# Patient Record
Sex: FEMALE | Race: WHITE | NOT HISPANIC OR LATINO | Employment: UNEMPLOYED | URBAN - METROPOLITAN AREA
[De-identification: names, ages, dates, MRNs, and addresses within clinical notes are randomized per-mention and may not be internally consistent; named-entity substitution may affect disease eponyms.]

---

## 2022-12-25 ENCOUNTER — APPOINTMENT (EMERGENCY)
Dept: CT IMAGING | Facility: HOSPITAL | Age: 57
End: 2022-12-25

## 2022-12-25 ENCOUNTER — HOSPITAL ENCOUNTER (INPATIENT)
Facility: HOSPITAL | Age: 57
LOS: 2 days | Discharge: HOME | End: 2022-12-27
Attending: EMERGENCY MEDICINE | Admitting: HOSPITALIST

## 2022-12-25 DIAGNOSIS — C18.7 MALIGNANT NEOPLASM OF SIGMOID COLON (HCC): ICD-10-CM

## 2022-12-25 DIAGNOSIS — A41.9 SEPSIS (HCC): ICD-10-CM

## 2022-12-25 DIAGNOSIS — K52.9 COLITIS: Primary | ICD-10-CM

## 2022-12-25 DIAGNOSIS — N13.30 HYDRONEPHROSIS: ICD-10-CM

## 2022-12-25 DIAGNOSIS — A41.9 SEVERE SEPSIS (HCC): ICD-10-CM

## 2022-12-25 DIAGNOSIS — R65.20 SEVERE SEPSIS (HCC): ICD-10-CM

## 2022-12-25 PROBLEM — E87.6 HYPOKALEMIA: Status: ACTIVE | Noted: 2022-12-25

## 2022-12-25 PROBLEM — R74.01 TRANSAMINITIS: Status: ACTIVE | Noted: 2022-12-25

## 2022-12-25 PROBLEM — R73.01 IMPAIRED FASTING GLUCOSE: Status: ACTIVE | Noted: 2022-12-25

## 2022-12-25 PROBLEM — I10 PRIMARY HYPERTENSION: Status: ACTIVE | Noted: 2022-12-25

## 2022-12-25 PROBLEM — E83.118 OTHER HEMOCHROMATOSIS: Status: ACTIVE | Noted: 2022-12-25

## 2022-12-25 PROBLEM — D86.9 SARCOIDOSIS: Status: ACTIVE | Noted: 2022-12-25

## 2022-12-25 LAB
2HR DELTA HS TROPONIN: 1 NG/L
4HR DELTA HS TROPONIN: 0 NG/L
ALBUMIN SERPL BCP-MCNC: 3.6 G/DL (ref 3.5–5)
ALP SERPL-CCNC: 165 U/L (ref 34–104)
ALT SERPL W P-5'-P-CCNC: 87 U/L (ref 7–52)
ANION GAP SERPL CALCULATED.3IONS-SCNC: 11 MMOL/L (ref 4–13)
APTT PPP: 27 SECONDS (ref 23–37)
AST SERPL W P-5'-P-CCNC: 54 U/L (ref 13–39)
BACTERIA UR QL AUTO: ABNORMAL /HPF
BASOPHILS # BLD AUTO: 0.06 THOUSANDS/ÂΜL (ref 0–0.1)
BASOPHILS NFR BLD AUTO: 0 % (ref 0–1)
BILIRUB SERPL-MCNC: 1.09 MG/DL (ref 0.2–1)
BILIRUB UR QL STRIP: NEGATIVE
BUN SERPL-MCNC: 28 MG/DL (ref 5–25)
CALCIUM SERPL-MCNC: 8.9 MG/DL (ref 8.4–10.2)
CARDIAC TROPONIN I PNL SERPL HS: 21 NG/L
CARDIAC TROPONIN I PNL SERPL HS: 21 NG/L
CARDIAC TROPONIN I PNL SERPL HS: 22 NG/L
CHLORIDE SERPL-SCNC: 102 MMOL/L (ref 96–108)
CLARITY UR: CLEAR
CO2 SERPL-SCNC: 23 MMOL/L (ref 21–32)
COLOR UR: COLORLESS
CREAT SERPL-MCNC: 0.86 MG/DL (ref 0.6–1.3)
EOSINOPHIL # BLD AUTO: 0.18 THOUSAND/ÂΜL (ref 0–0.61)
EOSINOPHIL NFR BLD AUTO: 1 % (ref 0–6)
ERYTHROCYTE [DISTWIDTH] IN BLOOD BY AUTOMATED COUNT: 14.6 % (ref 11.6–15.1)
FLUAV RNA RESP QL NAA+PROBE: NEGATIVE
FLUBV RNA RESP QL NAA+PROBE: NEGATIVE
GFR SERPL CREATININE-BSD FRML MDRD: 76 ML/MIN/1.73SQ M
GLUCOSE SERPL-MCNC: 142 MG/DL (ref 65–140)
GLUCOSE UR STRIP-MCNC: NEGATIVE MG/DL
HCT VFR BLD AUTO: 38.4 % (ref 34.8–46.1)
HGB BLD-MCNC: 12.8 G/DL (ref 11.5–15.4)
HGB UR QL STRIP.AUTO: ABNORMAL
IMM GRANULOCYTES # BLD AUTO: 0.2 THOUSAND/UL (ref 0–0.2)
IMM GRANULOCYTES NFR BLD AUTO: 1 % (ref 0–2)
INR PPP: 1 (ref 0.84–1.19)
KETONES UR STRIP-MCNC: NEGATIVE MG/DL
LACTATE SERPL-SCNC: 1 MMOL/L (ref 0.5–2)
LACTATE SERPL-SCNC: 1.4 MMOL/L (ref 0.5–2)
LACTATE SERPL-SCNC: 2.8 MMOL/L (ref 0.5–2)
LEUKOCYTE ESTERASE UR QL STRIP: ABNORMAL
LYMPHOCYTES # BLD AUTO: 1.98 THOUSANDS/ÂΜL (ref 0.6–4.47)
LYMPHOCYTES NFR BLD AUTO: 8 % (ref 14–44)
MCH RBC QN AUTO: 30.2 PG (ref 26.8–34.3)
MCHC RBC AUTO-ENTMCNC: 33.3 G/DL (ref 31.4–37.4)
MCV RBC AUTO: 91 FL (ref 82–98)
MONOCYTES # BLD AUTO: 0.67 THOUSAND/ÂΜL (ref 0.17–1.22)
MONOCYTES NFR BLD AUTO: 3 % (ref 4–12)
NEUTROPHILS # BLD AUTO: 21.28 THOUSANDS/ÂΜL (ref 1.85–7.62)
NEUTS SEG NFR BLD AUTO: 87 % (ref 43–75)
NITRITE UR QL STRIP: NEGATIVE
NON-SQ EPI CELLS URNS QL MICRO: ABNORMAL /HPF
NRBC BLD AUTO-RTO: 0 /100 WBCS
PH UR STRIP.AUTO: 7.5 [PH]
PLATELET # BLD AUTO: 124 THOUSANDS/UL (ref 149–390)
PLATELET # BLD AUTO: 221 THOUSANDS/UL (ref 149–390)
PMV BLD AUTO: 9.6 FL (ref 8.9–12.7)
PMV BLD AUTO: 9.6 FL (ref 8.9–12.7)
POTASSIUM SERPL-SCNC: 3.3 MMOL/L (ref 3.5–5.3)
PROCALCITONIN SERPL-MCNC: 0.15 NG/ML
PROT SERPL-MCNC: 5.9 G/DL (ref 6.4–8.4)
PROT UR STRIP-MCNC: ABNORMAL MG/DL
PROTHROMBIN TIME: 13.4 SECONDS (ref 11.6–14.5)
RBC # BLD AUTO: 4.24 MILLION/UL (ref 3.81–5.12)
RBC #/AREA URNS AUTO: ABNORMAL /HPF
RSV RNA RESP QL NAA+PROBE: NEGATIVE
SARS-COV-2 RNA RESP QL NAA+PROBE: NEGATIVE
SODIUM SERPL-SCNC: 136 MMOL/L (ref 135–147)
SP GR UR STRIP.AUTO: 1.05 (ref 1–1.03)
UROBILINOGEN UR STRIP-ACNC: <2 MG/DL
WBC # BLD AUTO: 24.37 THOUSAND/UL (ref 4.31–10.16)
WBC #/AREA URNS AUTO: ABNORMAL /HPF

## 2022-12-25 RX ORDER — MAGNESIUM HYDROXIDE/ALUMINUM HYDROXICE/SIMETHICONE 120; 1200; 1200 MG/30ML; MG/30ML; MG/30ML
30 SUSPENSION ORAL EVERY 6 HOURS PRN
Status: DISCONTINUED | OUTPATIENT
Start: 2022-12-25 | End: 2022-12-27 | Stop reason: HOSPADM

## 2022-12-25 RX ORDER — ENOXAPARIN SODIUM 100 MG/ML
40 INJECTION SUBCUTANEOUS DAILY
Status: DISCONTINUED | OUTPATIENT
Start: 2022-12-25 | End: 2022-12-26

## 2022-12-25 RX ORDER — TAMSULOSIN HYDROCHLORIDE 0.4 MG/1
0.4 CAPSULE ORAL
Status: ON HOLD | COMMUNITY
End: 2023-01-07 | Stop reason: SDUPTHER

## 2022-12-25 RX ORDER — SODIUM CHLORIDE 9 MG/ML
100 INJECTION, SOLUTION INTRAVENOUS CONTINUOUS
Status: DISCONTINUED | OUTPATIENT
Start: 2022-12-25 | End: 2022-12-27

## 2022-12-25 RX ORDER — PREDNISONE 20 MG/1
20 TABLET ORAL DAILY
COMMUNITY

## 2022-12-25 RX ORDER — PREDNISONE 10 MG/1
10 TABLET ORAL
COMMUNITY

## 2022-12-25 RX ORDER — ONDANSETRON 2 MG/ML
4 INJECTION INTRAMUSCULAR; INTRAVENOUS EVERY 6 HOURS PRN
Status: DISCONTINUED | OUTPATIENT
Start: 2022-12-25 | End: 2022-12-27 | Stop reason: HOSPADM

## 2022-12-25 RX ORDER — METHYLPREDNISOLONE SODIUM SUCCINATE 40 MG/ML
20 INJECTION, POWDER, LYOPHILIZED, FOR SOLUTION INTRAMUSCULAR; INTRAVENOUS EVERY 8 HOURS SCHEDULED
Status: DISCONTINUED | OUTPATIENT
Start: 2022-12-25 | End: 2022-12-26

## 2022-12-25 RX ORDER — METRONIDAZOLE 500 MG/100ML
500 INJECTION, SOLUTION INTRAVENOUS EVERY 8 HOURS
Status: DISCONTINUED | OUTPATIENT
Start: 2022-12-25 | End: 2022-12-26

## 2022-12-25 RX ORDER — ACETAMINOPHEN 325 MG/1
650 TABLET ORAL EVERY 6 HOURS PRN
COMMUNITY

## 2022-12-25 RX ORDER — CAPECITABINE 500 MG/1
1650 TABLET, FILM COATED ORAL
COMMUNITY
End: 2022-12-27

## 2022-12-25 RX ORDER — MORPHINE SULFATE 4 MG/ML
4 INJECTION, SOLUTION INTRAMUSCULAR; INTRAVENOUS ONCE
Status: COMPLETED | OUTPATIENT
Start: 2022-12-25 | End: 2022-12-25

## 2022-12-25 RX ORDER — LABETALOL HYDROCHLORIDE 5 MG/ML
10 INJECTION, SOLUTION INTRAVENOUS EVERY 6 HOURS PRN
Status: DISCONTINUED | OUTPATIENT
Start: 2022-12-25 | End: 2022-12-27 | Stop reason: HOSPADM

## 2022-12-25 RX ORDER — ONDANSETRON 2 MG/ML
4 INJECTION INTRAMUSCULAR; INTRAVENOUS ONCE
Status: COMPLETED | OUTPATIENT
Start: 2022-12-25 | End: 2022-12-25

## 2022-12-25 RX ADMIN — SODIUM CHLORIDE 125 ML/HR: 0.9 INJECTION, SOLUTION INTRAVENOUS at 17:09

## 2022-12-25 RX ADMIN — METHYLPREDNISOLONE SODIUM SUCCINATE 20 MG: 40 INJECTION, POWDER, FOR SOLUTION INTRAMUSCULAR; INTRAVENOUS at 21:11

## 2022-12-25 RX ADMIN — SODIUM CHLORIDE 125 ML/HR: 0.9 INJECTION, SOLUTION INTRAVENOUS at 12:24

## 2022-12-25 RX ADMIN — ENOXAPARIN SODIUM 40 MG: 40 INJECTION SUBCUTANEOUS at 12:35

## 2022-12-25 RX ADMIN — METRONIDAZOLE 500 MG: 5 INJECTION, SOLUTION INTRAVENOUS at 11:29

## 2022-12-25 RX ADMIN — CEFTRIAXONE SODIUM 1000 MG: 10 INJECTION, POWDER, FOR SOLUTION INTRAVENOUS at 08:37

## 2022-12-25 RX ADMIN — IOHEXOL 100 ML: 350 INJECTION, SOLUTION INTRAVENOUS at 08:26

## 2022-12-25 RX ADMIN — ONDANSETRON 4 MG: 2 INJECTION INTRAMUSCULAR; INTRAVENOUS at 07:37

## 2022-12-25 RX ADMIN — METHYLPREDNISOLONE SODIUM SUCCINATE 20 MG: 40 INJECTION, POWDER, FOR SOLUTION INTRAMUSCULAR; INTRAVENOUS at 14:10

## 2022-12-25 RX ADMIN — MORPHINE SULFATE 4 MG: 4 INJECTION INTRAVENOUS at 07:38

## 2022-12-25 RX ADMIN — METRONIDAZOLE 500 MG: 5 INJECTION, SOLUTION INTRAVENOUS at 17:53

## 2022-12-25 RX ADMIN — SODIUM CHLORIDE 1000 ML: 0.9 INJECTION, SOLUTION INTRAVENOUS at 08:16

## 2022-12-25 NOTE — ASSESSMENT & PLAN NOTE
Patient with recent history of kidney stones with bilateral ureteral stents in place    CT reveals moderate right and left hydro with bilateral ureteral stents and several areas of calcification along the stent which radiology states could represent sites of encrustation or calculi within the ureter  GFR of 76  Currently no acute issues  We will hold off any urology consult currently

## 2022-12-25 NOTE — ASSESSMENT & PLAN NOTE
Patient states has mildly elevated blood sugars can lead to steroid use but not on any medications  If blood sugars persistently elevated may need a sliding scale coverage

## 2022-12-25 NOTE — ASSESSMENT & PLAN NOTE
Patient presents with sepsis-secondary to acute colitis with tachycardia, leukocytosis, lactic acidosis and source of infection  Patient on fluid resuscitation  Lactic acid of 2 8  We will repeat a lactate in 2 hours time-continue fluids till lactate normalizes

## 2022-12-25 NOTE — ASSESSMENT & PLAN NOTE
States that she was diagnosed with hemochromatosis-not on any treatment currently  Need to obtain further records from her previous hospital

## 2022-12-25 NOTE — ASSESSMENT & PLAN NOTE
Blood pressure elevated    Secondary to sepsis  Not on any medications at home for blood pressure  Will add IV labetalol as needed for SBP more than 160  Depending on blood pressure levels here may need blood pressure medications for home once discharged

## 2022-12-25 NOTE — ED NOTES
Feeling much better, requested to walk to bathroom and did so without difficulty, derik Carty, RN  12/25/22 0592

## 2022-12-25 NOTE — ASSESSMENT & PLAN NOTE
Patient states that she was diagnosed with sarcoidosis secondary to miliary findings on her lung  Usually thought to be tuberculosis  Biopsy revealed noncaseating granulomas  Currently on prednisone 30 mg daily  Given sepsis and colitis will change to IV methylprednisolone 20 mg every 8hrs  This will cover the stress of sepsis as well    Changed to prednisone as well as colitis improves

## 2022-12-25 NOTE — ASSESSMENT & PLAN NOTE
Colon cancer s/p partial colectomy with colostomy formation 2 months ago in [place]  -Need to obtain records from her previous hospital  -Stoma functioning well    Patient denies any increased output from ostomy  -Consult wound care for ostomy care

## 2022-12-25 NOTE — SEPSIS NOTE
Sepsis Note   Liz Howard 56 y o  female MRN: [id number]  Unit/Bed#: ED-17 Encounter: [id number]       qSOFA     Row Name 12/25/22 1026 12/25/22 0929 12/25/22 0756 12/25/22 0653       Altered mental status GCS < 15 -- -- -- --     Respiratory Rate > / =22 0 0 0 1     Systolic BP < / =247 0 0 0 0     Q Sofa Score 0 0 0 1                Initial Sepsis Screening     Row Name 12/25/22 1037                Is the patient's history suggestive of a new or worsening infection? Yes (Proceed)  -JI        Suspected source of infection acute abdominal infection  -JI        Are two or more of the following signs & symptoms of infection both present and new to the patient? Yes (Proceed)  -JI        Indicate SIRS criteria Leukocytosis (WBC > 35928 IJL); Tachycardia > 90 bpm  -JI        If the answer is yes to both questions, suspicion of sepsis is present --        If severe sepsis is present AND tissue hypoperfusion perists in the hour after fluid resuscitation or lactate > 4, the patient meets criteria for SEPTIC SHOCK --        Are any of the following organ dysfunction criteria present within 6 hours of suspected infection and SIRS criteria that are NOT considered to be chronic conditions? Yes  -JI        Organ dysfunction Lactate > 2 0 mmol/L  -JI        Date of presentation of severe sepsis 12/25/22  -JI        Time of presentation of severe sepsis 0830  -JI        Tissue hypoperfusion persists in the hour after crystalloid fluid administration, evidenced, by either: --        Was hypotension present within one hour of the conclusion of crystalloid fluid administration?  No  -JI        Date of presentation of septic shock --        Time of presentation of septic shock --              User Key  (r) = Recorded By, (t) = Taken By, (c) = Cosigned By    [address] Name Provider Marlene Horner MD Physician

## 2022-12-25 NOTE — ASSESSMENT & PLAN NOTE
-Patient with a history of colon cancer s/p partial colectomy with ostomy in 2 months ago in ER  -Last chemotherapy on 12/20  Also on Xeloda  -Recently moved to this area hence no previous records available  -Presented with 2-day history of lower abdominal pain around her stoma and suprapubic pain  -Good ostomy output  No vomiting    Urine clear yellow  -CT reveals colitis with bowel involvement from the transverse colon to the level of the colostomy without obstruction  -Also has bilateral moderate hydro with bilateral stents secondary to ureteric calculi  -Started patient on ceftriaxone and Flagyl  -Morphine for pain  -IV fluids for sepsis and lactic acidosis  -Blood cultures sent  -Stool for C  difficile sent as well  Patient looking to establish care locally for all her needs

## 2022-12-25 NOTE — H&P
[facility]  H&P- Shubham Lorenz 1965, 56 y o  female MRN: [id number]  Unit/Bed#: ED-17 Encounter: [id number]  Primary Care Provider: No primary care provider on file  Date and time admitted to hospital: 12/25/2022  6:46 AM    Sepsis [facility])  Assessment & Plan  Patient presents with sepsis-secondary to acute colitis with tachycardia, leukocytosis, lactic acidosis and source of infection  Patient on fluid resuscitation  Lactic acid of 2 8  We will repeat a lactate in 2 hours time-continue fluids till lactate normalizes  * Acute colitis  Assessment & Plan  -Patient with a history of colon cancer s/p partial colectomy with ostomy in 2 months ago in ER  -Last chemotherapy on 12/20  Also on Xeloda  -Recently moved to this area hence no previous records available  -Presented with 2-day history of lower abdominal pain around her stoma and suprapubic pain  -Good ostomy output  No vomiting  Urine clear yellow  -CT reveals colitis with bowel involvement from the transverse colon to the level of the colostomy without obstruction  -Also has bilateral moderate hydro with bilateral stents secondary to ureteric calculi  -Started patient on ceftriaxone and Flagyl  -Morphine for pain  -IV fluids for sepsis and lactic acidosis  -Blood cultures sent  -Stool for C  difficile sent as well  Patient looking to establish care locally for all her needs    Hypokalemia  Assessment & Plan  Potassium of 3 3 likely secondary to colitis and decreased oral intake  Will replete    Malignant neoplasm of sigmoid colon [facility])  Assessment & Plan  Colon cancer s/p partial colectomy with colostomy formation 2 months ago in [place]  S/p chemotherapy last dose 1220  Also on Xeloda at home which will be held currently while septic  -Need to obtain records from her previous hospital  -Stoma functioning well    Patient denies any increased output from ostomy  -Consult wound care for ostomy care    Primary hypertension  Assessment & Plan  Blood pressure elevated  Secondary to sepsis  Not on any medications at home for blood pressure  Will add IV labetalol as needed for SBP more than 160  Depending on blood pressure levels here may need blood pressure medications for home once discharged    Sarcoidosis  Assessment & Plan  Patient states that she was diagnosed with sarcoidosis secondary to miliary findings on her lung  Usually thought to be tuberculosis  Biopsy revealed noncaseating granulomas  Currently on prednisone 30 mg daily  Given sepsis and colitis will change to IV methylprednisolone 20 mg every 8hrs  This will cover the stress of sepsis as well  Changed to prednisone as well as colitis improves    Transaminitis  Assessment & Plan  Likely secondary to hepatic steatosis, hemochromatosis plus minus sepsis  Continue to monitor    Other hemochromatosis  Assessment & Plan  States that she was diagnosed with hemochromatosis-not on any treatment currently  Need to obtain further records from her previous hospital    Bilateral hydronephrosis  Assessment & Plan  Patient with recent history of kidney stones with bilateral ureteral stents in place  CT reveals moderate right and left hydro with bilateral ureteral stents and several areas of calcification along the stent which radiology states could represent sites of encrustation or calculi within the ureter  GFR of 76  Currently no acute issues  We will hold off any urology consult currently    Impaired fasting glucose  Assessment & Plan  Patient states has mildly elevated blood sugars can lead to steroid use but not on any medications  If blood sugars persistently elevated may need a sliding scale coverage        History of Present Illness     HPI:  Balta Reid is a 56 y o  female who usually obtains care in [facility] but has recently moved to this area  She was recently diagnosed with colon cancer and underwent partial colectomy with ostomy formation 2 months ago  She is on chemo with last infusion on 12/20/2022 and also on Xeloda   Also has history of kidney stones with bilateral ureteral stents in place  History of sarcoidosis on 30 mg of prednisone daily  No records available since recently moved to this area  Presented with left lower quadrant abdominal pain around her stoma and suprapubic area  Also noticed some chills this morning  Good ostomy output, no vomiting, able to urinate immediately prior to arrival   CT shows colitis with bowel involvement from the mid transverse colon to the level of the colostomy without obstruction or acute surgical process  Also reveals mild right and left hydro with bilateral stents in place and several areas of calcification along the left stent which radiology says could represent sites of encrustation or calculi within the ureter  Met criteria for sepsis on arrival with leukocytosis 24,000, lactic acidosis of 2 8,   Patient started on fluid resuscitation as well as IV antibiotics  Historical Information   Past Medical History:   Diagnosis Date   • Colon cancer ([address] )    • Hemochromatosis, unspecified    • Kidney calculi    • Sarcoidosis      Patient Active Problem List   Diagnosis   • Sepsis ([address] )   • Malignant neoplasm of sigmoid colon ([address] )   • Acute colitis   • Other hemochromatosis   • Primary hypertension   • Sarcoidosis   • Impaired fasting glucose   • Hypokalemia   • Transaminitis   • Bilateral hydronephrosis     Past Surgical History:   Procedure Laterality Date   • COLON SURGERY     • URINARY SURGERY Bilateral     bilateral stents       Social History   Social History     Substance and Sexual Activity   Alcohol Use Not on file     Social History     Substance and Sexual Activity   Drug Use Not on file     Social History     Tobacco Use   Smoking Status Never   Smokeless Tobacco Never       Family History: No family history on file      Meds/Allergies       Current Facility-Administered Medications:   • metroNIDAZOLE (FLAGYL) IVPB (premix) 500 mg 100 mL, 500 mg, Intravenous, Q8H, Esperanza Claudio MD, Last Rate: 200 mL/hr at 12/25/22 1129, 500 mg at 12/25/22 1129    Current Outpatient Medications:   •  acetaminophen (TYLENOL) 325 mg tablet, Take 650 mg by mouth every 6 (six) hours as needed for mild pain, Disp: , Rfl:   •  capecitabine (XELODA) 500 MG tablet, Take 1,650 mg/m2 by mouth Takes total 1600 BID, Disp: , Rfl:   •  cyanocobalamin (VITAMIN B-12) 1000 MCG tablet, Take 1,000 mcg by mouth daily, Disp: , Rfl:   •  predniSONE 10 mg tablet, Take 10 mg by mouth daily at bedtime, Disp: , Rfl:   •  predniSONE 20 mg tablet, Take 20 mg by mouth in the morning Takes in am, Disp: , Rfl:   •  tamsulosin (Flomax) 0 4 mg, Take 0 4 mg by mouth daily at bedtime, Disp: , Rfl:     No Known Allergies    Review of Systems  A detailed 12 point review of systems was conducted and is negative apart from those mentioned in the HPI  Objective   Vitals: Blood pressure 136/77, pulse (!) 114, temperature 98 7 °F (37 1 °C), temperature source Oral, resp  rate 16, height 5' 1" (1 549 m), weight 68 1 kg (150 lb 2 1 oz), SpO2 98 %  Physical Exam   HEENT: PERRLA, EOMI, sclera anicteric, dry mucous membranes, tongue mucosa dry without lesions  Neck: supple, no JVD, lymphadenopathy, thyromegaly  Heart: Regular rate and rhythm, S1S2 present  No murmur, rub or gallop  Lungs; Clear to auscultation bilaterally  No wheezing, crackles or rhonchi  No accessory muscle use or respiratory distress  Abdomen: Ostomy in place left upper quadrant with pink stoma and liquid brown stool in the ostomy bag as well as air  Tenderness to palpation with voluntary guarding surrounding ostomy site  Rest of exam benign     Extremities: no clubbing, cyanosis, or edema  2+ pedal pulses bilaterally  Full range of motion  Neurologic:  Cranial nerves II-XII intact  Strength and sensation globally intact  Speech fluent and goal directed    Awake, alert and oriented x 3  Skin: warm and dry  No petechiae, purpura or rash  Lab Results: I have personally reviewed pertinent films in PACS    Results from last 7 days   Lab Units 12/25/22  0713   WBC Thousand/uL 24 37*   HEMOGLOBIN g/dL 12 8   HEMATOCRIT % 38 4   PLATELETS Thousands/uL 221   NEUTROS PCT % 87*   LYMPHS PCT % 8*   MONOS PCT % 3*   EOS PCT % 1     Results from last 7 days   Lab Units 12/25/22  0713   POTASSIUM mmol/L 3 3*   CHLORIDE mmol/L 102   CO2 mmol/L 23   BUN mg/dL 28*   CREATININE mg/dL 0 86   CALCIUM mg/dL 8 9   ALK PHOS U/L 165*   ALT U/L 87*   AST U/L 54*     Results from last 7 days   Lab Units 12/25/22  0713   INR  1 00           Imaging:colon in the region of the colostomy is most severely involved  No pneumatosis or free air      Moderate right and mild left hydronephrosis with bilateral ureteral stents in place  The proximal 2 cm of the right stent is encrusted  Several calcifications along the left stent may also represent focal sites of encrustation or calculi in the ureter      Nonobstructing calculi in the left kidney      Marked hepatic steatosis  EKG-normal sinus rhythm with no evidence of any ischemia             Code Status: No Order      Counseling / Coordination of Care  Total floor / unit time spent today 35 minutes  Greater than 50% of total time was spent with the patient and / or family counseling and / or coordination of care  Portions of the record may have been created with voice recognition software  Occasional wrong word or "sound a like" substitutions may have occurred due to the inherent limitations of voice recognition software  Read the chart carefully and recognize, using context, where substitutions have occurred

## 2022-12-25 NOTE — PLAN OF CARE
Problem: MOBILITY - ADULT  Goal: Maintain or return to baseline ADL function  Description: INTERVENTIONS:  -  Assess patient's ability to carry out ADLs; assess patient's baseline for ADL function and identify physical deficits which impact ability to perform ADLs (bathing, care of mouth/teeth, toileting, grooming, dressing, etc )  - Assess/evaluate cause of self-care deficits   - Assess range of motion  - Assess patient's mobility; develop plan if impaired  - Assess patient's need for assistive devices and provide as appropriate  - Encourage maximum independence but intervene and supervise when necessary  - Involve family in performance of ADLs  - Assess for home care needs following discharge   - Consider OT consult to assist with ADL evaluation and planning for discharge  - Provide patient education as appropriate  Outcome: Progressing  Goal: Maintains/Returns to pre admission functional level  Description: INTERVENTIONS:  - Perform BMAT or MOVE assessment daily    - Set and communicate daily mobility goal to care team and patient/family/caregiver  - Collaborate with rehabilitation services on mobility goals if consulted  - Perform Range of Motion  times a day  - Reposition patient every  hours    - Dangle patient  times a day  - Stand patient  times a day  - Ambulate patient  times a day  - Out of bed to chair  times a day   - Out of bed for meals times a day  - Out of bed for toileting  - Record patient progress and toleration of activity level   Outcome: Progressing     Problem: Potential for Falls  Goal: Patient will remain free of falls  Description: INTERVENTIONS:  - Educate patient/family on patient safety including physical limitations  - Instruct patient to call for assistance with activity   - Consult OT/PT to assist with strengthening/mobility   - Keep Call bell within reach  - Keep bed low and locked with side rails adjusted as appropriate  - Keep care items and personal belongings within reach  - Initiate and maintain comfort rounds  - Make Fall Risk Sign visible to staff  - Offer Toileting every  Hours, in advance of need  - Initiate/Maintain alarm  - Obtain necessary fall risk management equipment:  Apply yellow socks and bracelet for high fall risk patients  - Consider moving patient to room near nurses station  Outcome: Progressing     Problem: GASTROINTESTINAL - ADULT  Goal: Minimal or absence of nausea and/or vomiting  Description: INTERVENTIONS:  - Administer IV fluids if ordered to ensure adequate hydration  - Maintain NPO status until nausea and vomiting are resolved  - Nasogastric tube if ordered  - Administer ordered antiemetic medications as needed  - Provide nonpharmacologic comfort measures as appropriate  - Advance diet as tolerated, if ordered  - Consider nutrition services referral to assist patient with adequate nutrition and appropriate food choices  Outcome: Progressing  Goal: Maintains or returns to baseline bowel function  Description: INTERVENTIONS:  - Assess bowel function  - Encourage oral fluids to ensure adequate hydration  - Administer IV fluids if ordered to ensure adequate hydration  - Administer ordered medications as needed  - Encourage mobilization and activity  - Consider nutritional services referral to assist patient with adequate nutrition and appropriate food choices  Outcome: Progressing  Goal: Maintains adequate nutritional intake  Description: INTERVENTIONS:  - Monitor percentage of each meal consumed  - Identify factors contributing to decreased intake, treat as appropriate  - Assist with meals as needed  - Monitor I&O, weight, and lab values if indicated  - Obtain nutrition services referral as needed  Outcome: Progressing  Goal: Establish and maintain optimal ostomy function  Description: INTERVENTIONS:  - Assess bowel function  - Encourage oral fluids to ensure adequate hydration  - Administer IV fluids if ordered to ensure adequate hydration   - Administer ordered medications as needed  - Encourage mobilization and activity  - Nutrition services referral to assist patient with appropriate food choices  - Assess stoma site  - Consider wound care consult   Outcome: Progressing  Goal: Oral mucous membranes remain intact  Description: INTERVENTIONS  - Assess oral mucosa and hygiene practices  - Implement preventative oral hygiene regimen  - Implement oral medicated treatments as ordered  - Initiate Nutrition services referral as needed  Outcome: Progressing

## 2022-12-25 NOTE — ED PROVIDER NOTES
History  Chief Complaint   Patient presents with   • Altered Mental Status     Per family pt is undergoing chemo d/t colon cancer  States since yesterday the pt has been getting progressively weaker and c/o pain at her colostomy site  In triage pt not responding to questions or able to sit up on her own  HPI     61-year-old female with history of colon cancer diagnosed a couple of months ago with colostomy in place, on chemotherapy with last treatment 5 days ago  No records are available as the patient receives all of her care in [facility] but just recently moved here  Patient also has recently had numerous kidney stones and per her family at bedside has bilateral stents in place  Patient presents today with generalized weakness, fatigue, and pain around her ostomy and suprapubic area that started yesterday morning and has gotten progressively worse  Per nursing as the patient was being wheeled back to the exam room she was not responding to questioning however they state that she was awake and alert and looking at them when they were talking  When I entered the exam room the patient is alert and oriented x4 and is readily responsive  Patient reports severe pain around her ostomy site  States that her output has been more liquid than usual over the last 48 hours but denies blood in her stool  No vomiting  Denies chest pain or difficulty breathing  Reports an intense urge to urinate with pain in the suprapubic region  No known fevers but reports chills  No cough  Additional medical conditions include sarcoidosis for which she takes 30 mg of prednisone daily  Denies additional medical issues  The family denies that the patient has been confused or altered  Prior to Admission Medications   Prescriptions Last Dose Informant Patient Reported?  Taking?   acetaminophen (TYLENOL) 325 mg tablet 12/24/2022  Yes Yes   Sig: Take 650 mg by mouth every 6 (six) hours as needed for mild pain   capecitabine (XELODA) 500 MG tablet 12/24/2022  Yes Yes   Sig: Take 1,650 mg/m2 by mouth Takes total 1600 BID   cyanocobalamin (VITAMIN B-12) 1000 MCG tablet 12/24/2022  Yes Yes   Sig: Take 1,000 mcg by mouth daily   predniSONE 10 mg tablet 12/24/2022  Yes Yes   Sig: Take 10 mg by mouth daily at bedtime   predniSONE 20 mg tablet 12/24/2022  Yes Yes   Sig: Take 20 mg by mouth in the morning Takes in am   tamsulosin (Flomax) 0 4 mg 12/24/2022  Yes Yes   Sig: Take 0 4 mg by mouth daily at bedtime      Facility-Administered Medications: None       Past Medical History:   Diagnosis Date   • Colon cancer ([facility] [address] )    • Hemochromatosis, unspecified    • Kidney calculi    • Sarcoidosis        Past Surgical History:   Procedure Laterality Date   • COLON SURGERY     • URINARY SURGERY Bilateral     bilateral stents       No family history on file  I have reviewed and agree with the history as documented  E-Cigarette/Vaping     E-Cigarette/Vaping Substances     Social History     Tobacco Use   • Smoking status: Never   • Smokeless tobacco: Never       Review of Systems   Constitutional: Positive for chills and fatigue  Negative for fever  HENT: Negative for congestion  Eyes: Negative for visual disturbance  Respiratory: Negative for cough and shortness of breath  Cardiovascular: Negative for chest pain and leg swelling  Gastrointestinal: Positive for abdominal pain and diarrhea  Negative for blood in stool, nausea and vomiting  Genitourinary: Negative for dysuria, flank pain, frequency, vaginal bleeding and vaginal discharge  Musculoskeletal: Negative for arthralgias, back pain, neck pain and neck stiffness  Skin: Negative for rash  Neurological: Positive for weakness (generalized)  Negative for numbness and headaches  Psychiatric/Behavioral: Negative for agitation, behavioral problems and confusion  All other systems reviewed and are negative        Physical Exam  Physical Exam  Vitals and nursing note reviewed  Constitutional:       General: She is in acute distress (Appears uncomfortable, reporting severe pain around her ostomy site and in the suprapubic region)  Appearance: She is well-developed  She is not toxic-appearing or diaphoretic  HENT:      Head: Normocephalic and atraumatic  Right Ear: External ear normal       Left Ear: External ear normal       Nose: Nose normal    Eyes:      Conjunctiva/sclera: Conjunctivae normal    Cardiovascular:      Rate and Rhythm: Normal rate and regular rhythm  Heart sounds: Normal heart sounds  No murmur heard  No friction rub  No gallop  Pulmonary:      Effort: Pulmonary effort is normal  No respiratory distress  Breath sounds: Normal breath sounds  No wheezing or rales  Abdominal:      General: Bowel sounds are normal  There is no distension  Palpations: Abdomen is soft  Tenderness: There is abdominal tenderness  There is guarding (voluntary, around ostomy site)  Comments: Ostomy in place to the left upper quadrant with pink stoma and liquid brown stool in the ostomy bag as well as air  Tenderness to palpation with voluntary guarding surrounding ostomy site  Remainder of abdomen benign  Genitourinary:     Comments: Normal external exam of the rectum and female genitalia  Musculoskeletal:         General: No deformity  Normal range of motion  Cervical back: Normal range of motion and neck supple  Skin:     General: Skin is warm and dry  Neurological:      Mental Status: She is alert and oriented to person, place, and time  Motor: No abnormal muscle tone  Comments: A&Ox4 with normal speech and strength and in all 4 extremities            Vital Signs  ED Triage Vitals   Temperature Pulse Respirations Blood Pressure SpO2   12/25/22 0706 12/25/22 0653 12/25/22 0653 12/25/22 0653 12/25/22 0653   98 7 °F (37 1 °C) (!) 114 (!) 24 (!) 183/87 100 %      Temp Source Heart Rate Source Patient Position - Orthostatic VS BP Location FiO2 (%)   12/25/22 0706 12/25/22 0653 12/25/22 0653 12/25/22 0653 --   Oral Monitor Lying Right arm       Pain Score       12/25/22 0738       3           Vitals:    12/25/22 0929 12/25/22 1026 12/25/22 1138 12/25/22 1233   BP: 148/67 113/59 136/77 145/82   Pulse: 100 105 (!) 114 (!) 110   Patient Position - Orthostatic VS: Lying Lying Sitting          Visual Acuity      ED Medications  Medications   metroNIDAZOLE (FLAGYL) IVPB (premix) 500 mg 100 mL (500 mg Intravenous New Bag 12/25/22 1129)   ceftriaxone (ROCEPHIN) 1 g/50 mL in dextrose IVPB (has no administration in time range)   morphine injection 2 mg (has no administration in time range)   sodium chloride 0 9 % infusion (125 mL/hr Intravenous New Bag 12/25/22 1709)   ondansetron (ZOFRAN) injection 4 mg (has no administration in time range)   aluminum-magnesium hydroxide-simethicone (MYLANTA) oral suspension 30 mL (has no administration in time range)   enoxaparin (LOVENOX) subcutaneous injection 40 mg (40 mg Subcutaneous Given 12/25/22 1235)   labetalol (NORMODYNE) injection 10 mg (has no administration in time range)   methylPREDNISolone sodium succinate (Solu-MEDROL) injection 20 mg (20 mg Intravenous Given 12/25/22 1410)   morphine injection 4 mg (4 mg Intravenous Given 12/25/22 0738)   ondansetron (ZOFRAN) injection 4 mg (4 mg Intravenous Given 12/25/22 0737)   ceftriaxone (ROCEPHIN) 1 g/50 mL in dextrose IVPB (0 mg Intravenous Stopped 12/25/22 0951)   sodium chloride 0 9 % bolus 1,000 mL (0 mL Intravenous Stopped 12/25/22 0928)   iohexol (OMNIPAQUE) 350 MG/ML injection (SINGLE-DOSE) 100 mL (100 mL Intravenous Given 12/25/22 0826)       Diagnostic Studies  Results Reviewed     Procedure Component Value Units Date/Time    Blood culture #1 [[id number]] Collected: 12/25/22 0726    Lab Status: Preliminary result Specimen: Blood from Arm, Right Updated: 12/25/22 1501     Blood Culture Received in Microbiology Lab  Culture in Progress  Blood culture #2 [[id number]] Collected: 12/25/22 0726    Lab Status: Preliminary result Specimen: Blood from Arm, Right Updated: 12/25/22 1501     Blood Culture Received in Microbiology Lab  Culture in Progress  HS Troponin I 4hr [[id number]]  (Normal) Collected: 12/25/22 1246    Lab Status: Final result Specimen: Blood from Arm, Right Updated: 12/25/22 1334     hs TnI 4hr 21 ng/L      Delta 4hr hsTnI 0 ng/L     Urine Microscopic [[id number]]  (Abnormal) Collected: 12/25/22 1125    Lab Status: Final result Specimen: Urine, Clean Catch Updated: 12/25/22 1204     RBC, UA Innumerable /hpf      WBC, UA 10-20 /hpf      Epithelial Cells Occasional /hpf      Bacteria, UA Occasional /hpf     Urine culture [[id number]] Collected: 12/25/22 1125    Lab Status: In process Specimen: Urine, Clean Catch Updated: 12/25/22 1204    UA w Reflex to Microscopic w Reflex to Culture [[id number]]  (Abnormal) Collected: 12/25/22 1125    Lab Status: Final result Specimen: Urine, Clean Catch Updated: 12/25/22 1155     Color, UA Colorless     Clarity, UA Clear     Specific Gravity, UA 1 049     pH, UA 7 5     Leukocytes, UA Trace     Nitrite, UA Negative     Protein, UA Trace mg/dl      Glucose, UA Negative mg/dl      Ketones, UA Negative mg/dl      Urobilinogen, UA <2 0 mg/dl      Bilirubin, UA Negative     Occult Blood, UA Large    Stool Enteric Bacterial Panel by PCR [[id number]] Collected: 12/25/22 1059    Lab Status: In process Specimen: Stool from Per Stoma Updated: 12/25/22 1147    HS Troponin I 2hr [[id number]]  (Normal) Collected: 12/25/22 1027    Lab Status: Final result Specimen: Blood from Arm, Left Updated: 12/25/22 1136     hs TnI 2hr 22 ng/L      Delta 2hr hsTnI 1 ng/L     Clostridium difficile toxin by PCR with EIA [[id number]] Collected: 12/25/22 1126    Lab Status:  In process Specimen: Stool from Per Stoma Updated: 12/25/22 1135    Lactic acid 2 Hours [[id number]]  (Normal) Collected: 12/25/22 1027    Lab Status: Final result Specimen: Blood from Arm, Left Updated: 12/25/22 1056     LACTIC ACID 1 4 mmol/L     Narrative:      Result may be elevated if tourniquet was used during collection  FLU/RSV/COVID - if FLU/RSV clinically relevant [[id number]]  (Normal) Collected: 12/25/22 0757    Lab Status: Final result Specimen: Nares from Nose Updated: 12/25/22 0903     SARS-CoV-2 Negative     INFLUENZA A PCR Negative     INFLUENZA B PCR Negative     RSV PCR Negative    Narrative:      FOR PEDIATRIC PATIENTS - copy/paste COVID Guidelines URL to browser: [email or web address]  [email or web address]    SARS-CoV-2 assay is a Nucleic Acid Amplification assay intended for the  qualitative detection of nucleic acid from SARS-CoV-2 in nasopharyngeal  swabs  Results are for the presumptive identification of SARS-CoV-2 RNA  Positive results are indicative of infection with SARS-CoV-2, the virus  causing COVID-19, but do not rule out bacterial infection or co-infection  with other viruses  Laboratories within the [place] and its  territories are required to report all positive results to the appropriate  public health authorities  Negative results do not preclude SARS-CoV-2  infection and should not be used as the sole basis for treatment or other  patient management decisions  Negative results must be combined with  clinical observations, patient history, and epidemiological information  This test has not been FDA cleared or approved  This test has been authorized by FDA under an Emergency Use Authorization  (EUA)  This test is only authorized for the duration of time the  declaration that circumstances exist justifying the authorization of the  emergency use of an in vitro diagnostic tests for detection of SARS-CoV-2  virus and/or diagnosis of COVID-19 infection under section 564(b)(1) of  the Act, 21 U  S C  855YTT-1(Y)(6), unless the authorization is terminated  or revoked sooner   The test has been validated but independent review by FDA  and CLIA is pending  Test performed using [employer] GeneXpert: This RT-PCR assay targets N2,  a region unique to SARS-CoV-2  A conserved region in the E-gene was chosen  for pan-Sarbecovirus detection which includes SARS-CoV-2  According to CMS-2020-01-R, this platform meets the definition of high-throughput technology  Jessika Del Rio [[id number]]  (Normal) Collected: 12/25/22 0713    Lab Status: Final result Specimen: Blood from Arm, Left Updated: 12/25/22 0845     Protime 13 4 seconds      INR 1 00    APTT [[id number]]  (Normal) Collected: 12/25/22 0713    Lab Status: Final result Specimen: Blood from Arm, Left Updated: 12/25/22 0845     PTT 27 seconds     Lactic acid [[id number]]  (Abnormal) Collected: 12/25/22 0713    Lab Status: Final result Specimen: Blood from Arm, Left Updated: 12/25/22 0830     LACTIC ACID 2 8 mmol/L     Narrative:      Result may be elevated if tourniquet was used during collection      Procalcitonin [[id number]]  (Normal) Collected: 12/25/22 0713    Lab Status: Final result Specimen: Blood from Arm, Left Updated: 12/25/22 0809     Procalcitonin 0 15 ng/ml     HS Troponin 0hr (reflex protocol) [[id number]]  (Normal) Collected: 12/25/22 0713    Lab Status: Final result Specimen: Blood from Arm, Left Updated: 12/25/22 0808     hs TnI 0hr 21 ng/L     Comprehensive metabolic panel [[id number]]  (Abnormal) Collected: 12/25/22 0713    Lab Status: Final result Specimen: Blood from Arm, Left Updated: 12/25/22 0802     Sodium 136 mmol/L      Potassium 3 3 mmol/L      Chloride 102 mmol/L      CO2 23 mmol/L      ANION GAP 11 mmol/L      BUN 28 mg/dL      Creatinine 0 86 mg/dL      Glucose 142 mg/dL      Calcium 8 9 mg/dL      AST 54 U/L      ALT 87 U/L      Alkaline Phosphatase 165 U/L      Total Protein 5 9 g/dL      Albumin 3 6 g/dL      Total Bilirubin 1 09 mg/dL      eGFR 76 ml/min/1 73sq m     Narrative:      [place] guidelines for Chronic Kidney Disease (CKD):   •  Stage 1 with normal or high GFR (GFR > 90 mL/min/1 73 square meters)  •  Stage 2 Mild CKD (GFR = 60-89 mL/min/1 73 square meters)  •  Stage 3A Moderate CKD (GFR = 45-59 mL/min/1 73 square meters)  •  Stage 3B Moderate CKD (GFR = 30-44 mL/min/1 73 square meters)  •  Stage 4 Severe CKD (GFR = 15-29 mL/min/1 73 square meters)  •  Stage 5 End Stage CKD (GFR <15 mL/min/1 73 square meters)  Note: GFR calculation is accurate only with a steady state creatinine    CBC and differential [[id number]]  (Abnormal) Collected: 12/25/22 0713    Lab Status: Final result Specimen: Blood from Arm, Left Updated: 12/25/22 0738     WBC 24 37 Thousand/uL      RBC 4 24 Million/uL      Hemoglobin 12 8 g/dL      Hematocrit 38 4 %      MCV 91 fL      MCH 30 2 pg      MCHC 33 3 g/dL      RDW 14 6 %      MPV 9 6 fL      Platelets 014 Thousands/uL      nRBC 0 /100 WBCs      Neutrophils Relative 87 %      Immat GRANS % 1 %      Lymphocytes Relative 8 %      Monocytes Relative 3 %      Eosinophils Relative 1 %      Basophils Relative 0 %      Neutrophils Absolute 21 28 Thousands/µL      Immature Grans Absolute 0 20 Thousand/uL      Lymphocytes Absolute 1 98 Thousands/µL      Monocytes Absolute 0 67 Thousand/µL      Eosinophils Absolute 0 18 Thousand/µL      Basophils Absolute 0 06 Thousands/µL                  CT abdomen pelvis with contrast   Final Result by Iftikhar Tillman MD (12/25 0940)      Left mid abdominal colostomy with colitis with bowel involvement extending from the mid transverse colon to the level of the colostomy  The colon in the region of the colostomy is most severely involved  No pneumatosis or free air  Moderate right and mild left hydronephrosis with bilateral ureteral stents in place  The proximal 2 cm of the right stent is encrusted  Several calcifications along the left stent may also represent focal sites of encrustation or calculi in the ureter        Nonobstructing calculi in the left kidney  Marked hepatic steatosis  The study was marked in [facility] for immediate notification  Workstation performed: [id number]                    Procedures  CriticalCare Time  Performed by: Kimberly Wilkinson MD  Authorized by: Kimberly Wilkinson MD     Critical care provider statement:     Critical care time (minutes):  35    Critical care time was exclusive of:  Separately billable procedures and treating other patients and teaching time    Critical care was necessary to treat or prevent imminent or life-threatening deterioration of the following conditions:  Sepsis    Critical care was time spent personally by me on the following activities:  Obtaining history from patient or surrogate, development of treatment plan with patient or surrogate, evaluation of patient's response to treatment, examination of patient, re-evaluation of patient's condition, ordering and review of radiographic studies, ordering and review of laboratory studies and ordering and performing treatments and interventions    I assumed direction of critical care for this patient from another provider in my specialty: no               ED Course  ED Course as of 12/25/22 1713   Sun Dec 25, 2022   7578 Patient reporting need to urinate but unable to do so with purewick in place- nursing to perform bladder scan to assess for retention  8150 Sepsis workup ordered given presence of 2 SIRS criteria  7531 I personally interpreted the pt's EKG which reveals tachycardic rate to 108 bpm, normal axis, normal intervals, no ST segment deviation, pathologic T wave inversions, or pathologic Q waves  0746 Leukocytosis to 24 37 with left shift  Patient is on 30 mg of prednisone daily however baseline white count is unknown  In the setting of SIRS criteria and acute illness will empirically treat with ceftriaxone while evaluating for possible source of infection  9262 Bladder scan shows no urine in the bladder   Pt reportedly urinated just prior to arrival  Will give 1 L of NS and re-assess  2866 Lactic acidosis to 2 8  Fluids currently being administered  1045 CT scan of the abdomen pelvis shows colitis with bowel involvement from the mid transverse colon to the level of the colostomy without obstruction or acute surgical process  Patient also has moderate right and mild left hydro with bilateral stents in place and several areas of calcification along the left stent which radiology says could represent sites of encrustation or calculi within the ureter  Patient's GFR is normal at 76  She has not produced urine yet in the emergency department however bladder is empty on bladder scan  Flagyl added in addition to the ceftriaxone already given for treatment of colitis and will admit to medicine for further management  Initial Sepsis Screening     Row Name 12/25/22 1037                Is the patient's history suggestive of a new or worsening infection? Yes (Proceed)  -JI        Suspected source of infection acute abdominal infection  -JI        Are two or more of the following signs & symptoms of infection both present and new to the patient? Yes (Proceed)  -JI        Indicate SIRS criteria Leukocytosis (WBC > 71250 IJL); Tachycardia > 90 bpm  -JI        If the answer is yes to both questions, suspicion of sepsis is present --        If severe sepsis is present AND tissue hypoperfusion perists in the hour after fluid resuscitation or lactate > 4, the patient meets criteria for SEPTIC SHOCK --        Are any of the following organ dysfunction criteria present within 6 hours of suspected infection and SIRS criteria that are NOT considered to be chronic conditions?  Yes  -JI        Organ dysfunction Lactate > 2 0 mmol/L  -JI        Date of presentation of severe sepsis 12/25/22  -JI        Time of presentation of severe sepsis 0830  -JI        Tissue hypoperfusion persists in the hour after crystalloid fluid administration, evidenced, by either: --        Was hypotension present within one hour of the conclusion of crystalloid fluid administration? No  -JI        Date of presentation of septic shock --        Time of presentation of septic shock --              User Key  (r) = Recorded By, (t) = Taken By, (c) = Cosigned By    [address] Name Provider Marlene Horner MD Physician                              MDM  Number of Diagnoses or Management Options  Colitis: new and requires workup  Hydronephrosis: new and requires workup  Severe sepsis [facility]): new and requires workup  Diagnosis management comments: Please see ED course above for details of the Medical Decision Making            Amount and/or Complexity of Data Reviewed  Clinical lab tests: ordered and reviewed  Tests in the radiology section of CPT®: ordered and reviewed  Obtain history from someone other than the patient: yes  Review and summarize past medical records: yes  Discuss the patient with other providers: yes  Independent visualization of images, tracings, or specimens: yes        Disposition  Final diagnoses:   Colitis   Severe sepsis ([facility] [address] )   Hydronephrosis     Time reflects when diagnosis was documented in both MDM as applicable and the Disposition within this note     Time User Action Codes Description Comment    12/25/2022 11:04 AM Mack Meres Add [K52 9] Colitis     12/25/2022 11:04 AM Mack Meres Add [A41 9,  R65 20] Severe sepsis ([facility] [address] )     12/25/2022 11:04 AM Mack Meres Add [N13 30] Hydronephrosis       ED Disposition     ED Disposition   Admit    Condition   Stable    Date/Time   Sun Dec 25, 2022 11:03 AM    Comment   Case was discussed with SOFIYA and the patient's admission status was agreed to be Admission Status: inpatient status to the service of Dr Yash Araiza             Follow-up Information    None         Current Discharge Medication List      CONTINUE these medications which have NOT CHANGED    Details acetaminophen (TYLENOL) 325 mg tablet Take 650 mg by mouth every 6 (six) hours as needed for mild pain      capecitabine (XELODA) 500 MG tablet Take 1,650 mg/m2 by mouth Takes total 1600 BID      cyanocobalamin (VITAMIN B-12) 1000 MCG tablet Take 1,000 mcg by mouth daily      ! ! predniSONE 10 mg tablet Take 10 mg by mouth daily at bedtime      !! predniSONE 20 mg tablet Take 20 mg by mouth in the morning Takes in am      tamsulosin (Flomax) 0 4 mg Take 0 4 mg by mouth daily at bedtime       !! - Potential duplicate medications found  Please discuss with provider  No discharge procedures on file      PDMP Review       Value Time User    PDMP Reviewed  Yes 12/25/2022  7:14 AM Le Rivera MD          ED Provider  Electronically Signed by           Le Rivera MD  12/25/22 2062

## 2022-12-26 LAB
ALBUMIN SERPL BCP-MCNC: 3.4 G/DL (ref 3.5–5)
ALP SERPL-CCNC: 138 U/L (ref 34–104)
ALT SERPL W P-5'-P-CCNC: 70 U/L (ref 7–52)
ANION GAP SERPL CALCULATED.3IONS-SCNC: 7 MMOL/L (ref 4–13)
AST SERPL W P-5'-P-CCNC: 38 U/L (ref 13–39)
BASOPHILS # BLD AUTO: 0.01 THOUSANDS/ÂΜL (ref 0–0.1)
BASOPHILS NFR BLD AUTO: 0 % (ref 0–1)
BILIRUB SERPL-MCNC: 0.66 MG/DL (ref 0.2–1)
BUN SERPL-MCNC: 20 MG/DL (ref 5–25)
C DIFF TOX A+B STL QL IA: POSITIVE
C DIFF TOX GENS STL QL NAA+PROBE: POSITIVE
CALCIUM ALBUM COR SERPL-MCNC: 8.5 MG/DL (ref 8.3–10.1)
CALCIUM SERPL-MCNC: 8 MG/DL (ref 8.4–10.2)
CAMPYLOBACTER DNA SPEC NAA+PROBE: NORMAL
CHLORIDE SERPL-SCNC: 109 MMOL/L (ref 96–108)
CO2 SERPL-SCNC: 23 MMOL/L (ref 21–32)
CREAT SERPL-MCNC: 0.67 MG/DL (ref 0.6–1.3)
EOSINOPHIL # BLD AUTO: 0 THOUSAND/ÂΜL (ref 0–0.61)
EOSINOPHIL NFR BLD AUTO: 0 % (ref 0–6)
ERYTHROCYTE [DISTWIDTH] IN BLOOD BY AUTOMATED COUNT: 14.6 % (ref 11.6–15.1)
EST. AVERAGE GLUCOSE BLD GHB EST-MCNC: 137 MG/DL
GFR SERPL CREATININE-BSD FRML MDRD: 99 ML/MIN/1.73SQ M
GLUCOSE SERPL-MCNC: 145 MG/DL (ref 65–140)
GLUCOSE SERPL-MCNC: 198 MG/DL (ref 65–140)
GLUCOSE SERPL-MCNC: 201 MG/DL (ref 65–140)
HBA1C MFR BLD: 6.4 %
HCT VFR BLD AUTO: 34.1 % (ref 34.8–46.1)
HGB BLD-MCNC: 11.2 G/DL (ref 11.5–15.4)
IMM GRANULOCYTES # BLD AUTO: 0.06 THOUSAND/UL (ref 0–0.2)
IMM GRANULOCYTES NFR BLD AUTO: 1 % (ref 0–2)
LACTATE SERPL-SCNC: 0.7 MMOL/L (ref 0.5–2)
LYMPHOCYTES # BLD AUTO: 0.34 THOUSANDS/ÂΜL (ref 0.6–4.47)
LYMPHOCYTES NFR BLD AUTO: 4 % (ref 14–44)
MCH RBC QN AUTO: 30.3 PG (ref 26.8–34.3)
MCHC RBC AUTO-ENTMCNC: 32.8 G/DL (ref 31.4–37.4)
MCV RBC AUTO: 92 FL (ref 82–98)
MONOCYTES # BLD AUTO: 0.24 THOUSAND/ÂΜL (ref 0.17–1.22)
MONOCYTES NFR BLD AUTO: 3 % (ref 4–12)
NEUTROPHILS # BLD AUTO: 8.78 THOUSANDS/ÂΜL (ref 1.85–7.62)
NEUTS SEG NFR BLD AUTO: 92 % (ref 43–75)
NRBC BLD AUTO-RTO: 0 /100 WBCS
PLATELET # BLD AUTO: 103 THOUSANDS/UL (ref 149–390)
PMV BLD AUTO: 10 FL (ref 8.9–12.7)
POTASSIUM SERPL-SCNC: 3.6 MMOL/L (ref 3.5–5.3)
PROCALCITONIN SERPL-MCNC: 0.19 NG/ML
PROT SERPL-MCNC: 6.1 G/DL (ref 6.4–8.4)
RBC # BLD AUTO: 3.7 MILLION/UL (ref 3.81–5.12)
SALMONELLA DNA SPEC QL NAA+PROBE: NORMAL
SHIGA TOXIN STX GENE SPEC NAA+PROBE: NORMAL
SHIGELLA DNA SPEC QL NAA+PROBE: NORMAL
SODIUM SERPL-SCNC: 139 MMOL/L (ref 135–147)
WBC # BLD AUTO: 9.43 THOUSAND/UL (ref 4.31–10.16)

## 2022-12-26 RX ORDER — INSULIN LISPRO 100 [IU]/ML
1-5 INJECTION, SOLUTION INTRAVENOUS; SUBCUTANEOUS
Status: DISCONTINUED | OUTPATIENT
Start: 2022-12-26 | End: 2022-12-27 | Stop reason: HOSPADM

## 2022-12-26 RX ADMIN — Medication 125 MG: at 13:09

## 2022-12-26 RX ADMIN — SODIUM CHLORIDE 125 ML/HR: 0.9 INJECTION, SOLUTION INTRAVENOUS at 04:44

## 2022-12-26 RX ADMIN — METHYLPREDNISOLONE SODIUM SUCCINATE 20 MG: 40 INJECTION, POWDER, FOR SOLUTION INTRAMUSCULAR; INTRAVENOUS at 05:02

## 2022-12-26 RX ADMIN — PREDNISONE 30 MG: 20 TABLET ORAL at 10:33

## 2022-12-26 RX ADMIN — MORPHINE SULFATE 2 MG: 2 INJECTION, SOLUTION INTRAMUSCULAR; INTRAVENOUS at 09:28

## 2022-12-26 RX ADMIN — CEFTRIAXONE SODIUM 1000 MG: 10 INJECTION, POWDER, FOR SOLUTION INTRAVENOUS at 10:32

## 2022-12-26 RX ADMIN — METRONIDAZOLE 500 MG: 5 INJECTION, SOLUTION INTRAVENOUS at 03:33

## 2022-12-26 RX ADMIN — Medication 125 MG: at 17:48

## 2022-12-26 RX ADMIN — INSULIN LISPRO 1 UNITS: 100 INJECTION, SOLUTION INTRAVENOUS; SUBCUTANEOUS at 16:06

## 2022-12-26 RX ADMIN — MORPHINE SULFATE 2 MG: 2 INJECTION, SOLUTION INTRAMUSCULAR; INTRAVENOUS at 04:26

## 2022-12-26 RX ADMIN — LABETALOL HYDROCHLORIDE 10 MG: 5 INJECTION, SOLUTION INTRAVENOUS at 08:36

## 2022-12-26 RX ADMIN — Medication 125 MG: at 23:01

## 2022-12-26 RX ADMIN — ENOXAPARIN SODIUM 40 MG: 40 INJECTION SUBCUTANEOUS at 08:36

## 2022-12-26 NOTE — PROGRESS NOTES
[facility]  Progress Note - Xiomy Penelope 1965, 56 y o  female MRN: [id number]  Unit/Bed#: S -01 Encounter: [id number]  Primary Care Provider: No primary care provider on file  Date and time admitted to hospital: 12/25/2022  6:46 AM    * Acute colitis  Assessment & Plan  · Patient with a history of colon cancer s/p partial colectomy with ostomy 2 months ago  Last chemotherapy on 12/20  Also on Xeloda  Recently moved to this area hence no previous records available  Needs to get established locally  · Presented with 2-day history of diffuse abdominal pain without bloody stool  · ? de-escalate diet  · CT abdomen pelvis "Left mid abdominal colostomy with colitis with bowel involvement extending from the mid transverse colon to the level of the colostomy  The colon in the region of the colostomy is most severely involved  No pneumatosis or free air "  · Day #2 ceftriaxone and Flagyl  · Colorectal surgery consult  · Supportive measures including IV fluids and morphine for pain for now      Bilateral hydronephrosis  Assessment & Plan  · Patient with recent history of kidney stones with bilateral ureteral stents in place  · CT reveals moderate right and left hydro with bilateral ureteral stents and several areas of calcification along the stent which radiology states could represent sites of encrustation or calculi within the ureter  The stent on the left is coiled in an upper pole calyx  · Needs to establish care as well  · Patient also noting hematuria  · Will consult urology          Sepsis [facility])  Assessment & Plan  · Noted to have tachycardia, leukocytosis, lactic acidosis and source of infection is colitis    · Now improving  · Blood and urine cultures in process as well as stool cultures      Sarcoidosis  Assessment & Plan  · Patient states that she was diagnosed with sarcoidosis secondary to miliary findings on her lung and biopsy revealed noncaseating granulomas  · Pre hospital patient was on prednisone 30 mg daily; She was changed on admission to IV methylprednisolone 20 mg every 8hrs due to sepsis   · However, patient is noted to be hypertensive and it is reasonable to transition back to her prehospital prednisone dose  · She will need to get established with pulmonology in the area    Primary hypertension  Assessment & Plan  · Blood pressure elevated since admission, possibly exacerbated by pain  · Not on any medications at home for blood pressure  · IV labetalol as needed for SBP more than 160  · Depending on blood pressure levels here may need blood pressure medications for home once discharged    Impaired fasting glucose  Assessment & Plan  · Check A1c  · Add Accu-Cheks with sliding scale coverage    Other hemochromatosis  Assessment & Plan  · States that she was diagnosed with hemochromatosis-not on any treatment currently      Transaminitis  Assessment & Plan  · Likely secondary to hepatic steatosis, hemochromatosis, sepsis  Continue to monitor    VTE Pharmacologic Prophylaxis:  hold due to hematuria    Patient Centered Rounds: I performed bedside rounds with nursing staff today  Discussions with Specialists or Other Care Team Provider: colorectal, urology    Education and Discussions with Family / Patient:     Time Spent for Care: 30 minutes  More than 50% of total time spent on counseling and coordination of care as described above  Current Length of Stay: 1 day(s)  Current Patient Status: Inpatient   Certification Statement: The patient will continue to require additional inpatient hospital stay due to IV antbx  Discharge Plan: Anticipate discharge in 24-48 hrs to home  Code Status: Level 1 - Full Code    Subjective:   Patient tolerating regular diet, with no reports of black or bloody stool but she does tell me she has some blood in her urine  Denies any fever, chills, vomiting but reports diffuse abdominal discomfort    She reports IV morphine is helping with the pain  Nursing notes her blood pressure was elevated this morning    Objective:     Vitals:   Temp (24hrs), Av °F (37 2 °C), Min:97 9 °F (36 6 °C), Max:99 8 °F (37 7 °C)    Temp:  [97 9 °F (36 6 °C)-99 8 °F (37 7 °C)] 99 3 °F (37 4 °C)  HR:  [] 102  Resp:  [16-18] 18  BP: (113-172)/(59-93) 172/80  SpO2:  [95 %-98 %] 97 %  Body mass index is 28 66 kg/m²  Input and Output Summary (last 24 hours): Intake/Output Summary (Last 24 hours) at 2022 1010  Last data filed at 2022 0801  Gross per 24 hour   Intake --   Output 1490 ml   Net -1490 ml       Physical Exam:   Physical Exam  Vitals reviewed  Constitutional:       General: She is not in acute distress  Appearance: Normal appearance  She is not ill-appearing, toxic-appearing or diaphoretic  Comments: Nontoxic-appearing   Eyes:      General: No scleral icterus  Right eye: No discharge  Left eye: No discharge  Conjunctiva/sclera: Conjunctivae normal    Cardiovascular:      Rate and Rhythm: Normal rate and regular rhythm  Heart sounds: No murmur heard  Pulmonary:      Effort: No respiratory distress  Breath sounds: No stridor  No wheezing or rhonchi  Abdominal:      General: There is no distension  Palpations: Abdomen is soft  Tenderness: There is no abdominal tenderness  There is no guarding  Comments: Brown stool in ostomy with no evidence of blood  No focal tenderness to palpation noted   Genitourinary:     Comments: Urine in the hat in her bathroom shows small tinge of blood with no clots  Musculoskeletal:         General: No swelling, tenderness, deformity or signs of injury  Right lower leg: No edema  Left lower leg: No edema  Skin:     General: Skin is warm and dry  Coloration: Skin is not jaundiced or pale  Findings: No bruising, erythema, lesion or rash  Neurological:      General: No focal deficit present  Mental Status: She is alert   Mental status is at baseline  Psychiatric:         Mood and Affect: Mood normal           Additional Data:     Labs:  Results from last 7 days   Lab Units 12/26/22  0505   WBC Thousand/uL 9 43   HEMOGLOBIN g/dL 11 2*   HEMATOCRIT % 34 1*   PLATELETS Thousands/uL 103*   NEUTROS PCT % 92*   LYMPHS PCT % 4*   MONOS PCT % 3*   EOS PCT % 0     Results from last 7 days   Lab Units 12/26/22  0505   SODIUM mmol/L 139   POTASSIUM mmol/L 3 6   CHLORIDE mmol/L 109*   CO2 mmol/L 23   BUN mg/dL 20   CREATININE mg/dL 0 67   ANION GAP mmol/L 7   CALCIUM mg/dL 8 0*   ALBUMIN g/dL 3 4*   TOTAL BILIRUBIN mg/dL 0 66   ALK PHOS U/L 138*   ALT U/L 70*   AST U/L 38   GLUCOSE RANDOM mg/dL 145*     Results from last 7 days   Lab Units 12/25/22  0713   INR  1 00             Results from last 7 days   Lab Units 12/26/22  0505 12/25/22  1246 12/25/22  1027 12/25/22  0713   LACTIC ACID mmol/L 0 7 1 0 1 4 2 8*   PROCALCITONIN ng/ml 0 19  --   --  0 15       Lines/Drains:  Invasive Devices     Peripheral Intravenous Line  Duration           Peripheral IV 12/25/22 Left Antecubital 1 day          Drain  Duration           Colostomy Descending/sigmoid LLQ 27 days                Imaging: CAT scan    Recent Cultures (last 7 days):   Results from last 7 days   Lab Units 12/25/22  0726   BLOOD CULTURE  Received in Microbiology Lab  Culture in Progress  Received in Microbiology Lab  Culture in Progress         Last 24 Hours Medication List:   Current Facility-Administered Medications   Medication Dose Route Frequency Provider Last Rate   • aluminum-magnesium hydroxide-simethicone  30 mL Oral Q6H PRN Pepper Stephenson MD     • cefTRIAXone  1,000 mg Intravenous Q24H Pepper Stephenson MD     • insulin lispro  1-5 Units Subcutaneous TID AC Gayla Alfaro PA-C     • labetalol  10 mg Intravenous Q6H PRN Pepper Stephenson MD     • metroNIDAZOLE  500 mg Intravenous Q8H Ana Cristina Mckeon  mg (12/26/22 0333)   • morphine injection  2 mg Intravenous Q4H PRN Ying Drake MD     • ondansetron  4 mg Intravenous Q6H PRN Ying Drake MD     • predniSONE  30 mg Oral Daily Gayla Alfaro PA-C     • sodium chloride  125 mL/hr Intravenous Continuous Ying Drake  mL/hr (12/26/22 0444)        Today, Patient Was Seen By: Valery Heard PA-C    **Please Note: This note may have been constructed using a voice recognition system  **

## 2022-12-26 NOTE — ASSESSMENT & PLAN NOTE
· Patient with recent history of kidney stones with bilateral ureteral stents in place  · CT reveals moderate right and left hydro with bilateral ureteral stents and several areas of calcification along the stent which radiology states could represent sites of encrustation or calculi within the ureter  The stent on the left is coiled in an upper pole calyx    · Needs to establish care as well  · Patient also noting hematuria  · Will consult urology

## 2022-12-26 NOTE — DISCHARGE INSTR - OTHER ORDERS
Ostomy Care:  1  Peel back pouch using push-pull method, may use non-alcohol adhesive remover(Purple and white package)  2  Use warm water only to cleanse skin around the stoma (edmundo-stomal skin)  3  Make sure all adhesive residue is removed and skin is dry and not oily  4  Measure stoma size using measuring guide and trace correct measurements onto back of pouch   5  Then cut backing of pouch out to correct shape/size  6  Place pouch over stoma and onto skin  7  Use warmth of hand to apply gentle pressure to help backing of pouch to adhere well to skin  Please contact [phone] option 1 for ostomy care or questions   Refer to the ostomy nurses contact info that was provided if need questions or concerns answered

## 2022-12-26 NOTE — ASSESSMENT & PLAN NOTE
· Patient with a history of colon cancer s/p partial colectomy with ostomy 2 months ago  Last chemotherapy on 12/20  Also on Xeloda  Recently moved to this area hence no previous records available  Needs to get established locally  · Presented with 2-day history of diffuse abdominal pain without bloody stool  · ? de-escalate diet  · CT abdomen pelvis "Left mid abdominal colostomy with colitis with bowel involvement extending from the mid transverse colon to the level of the colostomy  The colon in the region of the colostomy is most severely involved    No pneumatosis or free air "  · Day #2 ceftriaxone and Flagyl  · Colorectal surgery consult  · Supportive measures including IV fluids and morphine for pain for now

## 2022-12-26 NOTE — ASSESSMENT & PLAN NOTE
· Noted to have tachycardia, leukocytosis, lactic acidosis and source of infection is colitis    · Now improving  · Blood and urine cultures in process as well as stool cultures

## 2022-12-26 NOTE — ASSESSMENT & PLAN NOTE
· Possibly secondary to UTI  · Recommend culture driven antibiotics  · Medical optimization per primary team  · WBC 9 43, improved from 24 37  · Afebrile, slightly hypertensive otherwise vital signs stable  · Lactic acid and procalcitonin normal today

## 2022-12-26 NOTE — ASSESSMENT & PLAN NOTE
· S/p bilateral stent placement possibly in June 2022  · CT scan: Moderate right and mild left hydronephrosis with bilateral ureteral stents in place    Proximal 2 cm right stent encrusted, several calcifications along left stent may represent focal sites of encrustation or calculi  · Creat 0 67  · WBC 9 43 (24 37 yesterday)  · Will need to obtain outpatient urology records  · Eventual definitive stone management and stent removal which can be completed in the outpatient setting once medically stable

## 2022-12-26 NOTE — WOUND OSTOMY CARE
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 in [facility]  Notes reflect her surgeon was in [place] at [facility]   She has a appointment next week with the surgical team   She reports she lives in [facility]  Patient is using a 2 piece palak appliance   Reports no leakage and changes the appliance every 4 days   She has her supplies   Appliance changed and reviewed step by step with the patient   Stoma is moist pink and slightly budded   Please refer to the picture   Stoma is oval in shape and measuring 1 1/4 inch by 1 1/2 inch  Reviewed cleansing of the peristomal skin with warm water only then pat dry   Peristomal skin clean dry and intact   Cutting of the wafer to the size and can mold slightly   Patient given template to use for cutting further appliances for changes   Reviewed snapping the bag and placing clip on the bottom of the  Appliance   Support group information given and card for contacting  the ostomy nurse if she has questions or concerns   Patient interested in the support group   Provided literature and supplies of soft convexity coloplast appliances due to the stoma is only slightly budded   Discussed by the ostomy nurse to ensure she has the set up to receive supplies   Reviewed the need for the script from the MD for ordering the supplies  Will provide contact number for the patient if upon discharge has questions or concerns for ostomy care   Plan: Will follow and placed in discharge for contact for ostomy care   Ostomy nurse provided patient with contact information for questions or concerns   Ostomy Care:  1   Peel back pouch using push-pull method, may use non-alcohol adhesive remover(Purple and white package)  2  Use warm water only to cleanse skin around the stoma (edmundo-stomal skin)  3  Make sure all adhesive residue is removed and skin is dry and not oily  4  Measure stoma size using measuring guide and trace correct measurements onto back of pouch   5  Then cut backing of pouch out to correct shape/size  6  Place pouch over stoma and onto skin  7  Use warmth of hand to apply gentle pressure to help backing of pouch to adhere well to skin  Vitals:    12/26/22 1559   BP: 162/85   Pulse: 103   Resp: 18   Temp: 98 °F (36 7 °C)   SpO2: 96%             Wound 12/26/22 Abdomen Left (Active)   Wound Image   12/26/22 1317   Wound Description Clean;Beefy red 12/26/22 1317   Number of days: 0         Colostomy Descending/sigmoid LLQ (Active)   Stomal Appliance 2 piece; Changed 12/26/22 1311   Stoma Assessment Budded;[place] 12/26/22 1311   Stoma Shape Oval 12/26/22 1311   Peristomal Assessment Clean; Intact 12/26/22 1311   Treatment Bag change 12/26/22 1311   Number of days: [address] RN BSN Olivier Collins

## 2022-12-26 NOTE — ASSESSMENT & PLAN NOTE
· Blood pressure elevated since admission, possibly exacerbated by pain  · Not on any medications at home for blood pressure  · IV labetalol as needed for SBP more than 160  · Depending on blood pressure levels here may need blood pressure medications for home once discharged

## 2022-12-26 NOTE — CONSULTS
Consult: Colorectal surgery  Wandy Ozuna 56 y o  female MRN: [id number]  Unit/Bed#: S -01 Encounter: [id number]        Assessment/Plan     Assessment:  Patient is a 56 y o  female with pmhx of colon cancer status post diverting loop colostomy on  at an outside hospital in [place] for an obstructing rectosigmoid tumor who presented with sepsis in the setting of peristomal colitis found to be C  difficile positive  Sepsis has resolved following fluid resuscitation and antibiotics, she is tolerating diet without issue or pain and colostomy function has normalized  Afebrile, VSS  WBC: 9 from 24; Hb from 15; lactate: 0 7 from 2 5    CTAP showed Left mid abdominal colostomy with colitis with bowel involvement extending from the mid transverse colon to the level of the colostomy  The colon in the region of the colostomy is most severely involved  No pneumatosis or free air      Moderate right and mild left hydronephrosis with bilateral ureteral stents in place  The proximal 2 cm of the right stent is encrusted  Several calcifications along the left stent may also represent focal sites of encrustation or calculi in the ureter      Nonobstructing calculi in the left kidney      Marked hepatic steatosis        Plan:  -No acute colorectal surgical intervention  -Diet as tolerated  -Continue antibiotics for C  Difficile  -Continue with chemo and surgical follow-up in [place]  -Rest of care per primary team    History of Present Illness     HPI:  Wandy Ozuna is a 56 y o  female with pmhx of colon cancer status post diverting loop colostomy on  at an outside hospital in [place] for an obstructing rectosigmoid tumor  She presented with 3 days of progressive abdominal pain with associated mucus/bloody diarrhea from the stoma and from her rectum  In the ED she was found to be septic with a white count of 24 and a lactate of 2 5; this has resolved with resuscitation and appropriate antibiotics  She denies fevers, chills, nausea, vomiting chest pain, shortness of breath, dysuria throughout this timeframe  She reports is the first time having had pain or bowel movements of this nature  She reports that she started chemotherapy regimen 2 weeks ago  This morning her complaints have resolved; abdominal pain has completely resolved and her colostomy function is approaching baseline  She is not to be C  difficile positive  She reports that she plans to continue with her chemotherapy and with her surgical follow-up in [place] as scheduled; next appointment is January 4       Review of Systems   As above, otherwise negative  Consults    Historical Information   Past Medical History:   Diagnosis Date   • Colon cancer ([facility] [address] )    • Hemochromatosis, unspecified    • Kidney calculi    • Sarcoidosis      Past Surgical History:   Procedure Laterality Date   • COLON SURGERY     • URINARY SURGERY Bilateral     bilateral stents     Social History   Social History     Substance and Sexual Activity   Alcohol Use Not on file     Social History     Substance and Sexual Activity   Drug Use Not on file     Social History     Tobacco Use   Smoking Status Never   Smokeless Tobacco Never     Family History: non-contributory    Meds/Allergies   all medications and allergies reviewed  No Known Allergies    Objective   First Vitals:   Blood Pressure: (!) 183/87 (12/25/22 0653)  Pulse: (!) 114 (12/25/22 0653)  Temperature: 98 7 °F (37 1 °C) (12/25/22 0706)  Temp Source: Oral (12/25/22 0706)  Respirations: (!) 24 (12/25/22 0653)  Height: 5' 1" (154 9 cm) (12/25/22 1138)  Weight - Scale: 68 1 kg (150 lb 2 1 oz) (12/25/22 0653)  SpO2: 100 % (12/25/22 0653)    Current Vitals:   Blood Pressure: 147/87 (12/26/22 1146)  Pulse: 97 (12/26/22 1146)  Temperature: 99 3 °F (37 4 °C) (12/26/22 0820)  Temp Source: Oral (12/25/22 0706)  Respirations: 18 (12/26/22 0820)  Height: 5' 1" (154 9 cm) (12/25/22 1138)  Weight - Scale: 68 8 kg (151 lb 10 8 oz) (12/26/22 0600)  SpO2: 95 % (12/26/22 1146)      Intake/Output Summary (Last 24 hours) at 12/26/2022 1338  Last data filed at 12/26/2022 1311  Gross per 24 hour   Intake --   Output 1590 ml   Net -1590 ml       Invasive Devices     Peripheral Intravenous Line  Duration           Peripheral IV 12/25/22 Left Antecubital 1 day          Drain  Duration           Colostomy Descending/sigmoid LLQ 28 days                Physical Exam:  General: No acute distress, alert and oriented  CV: Well perfused, regular rate and rhythm  Lungs: Normal work of breathing, no increased respiratory effort  Abdomen: Soft, non-tender, non-distended  Laparoscopic incisions well-healing  Colostomy is pink and viable without signs of inflammation; loose stool and gas present in the bag  Extremities: No edema, clubbing or cyanosis  Skin: Warm, dry    Lab Results: I have personally reviewed pertinent lab results  Imaging: I have personally reviewed pertinent reports  EKG, Pathology, and Other Studies: I have personally reviewed pertinent reports        Code Status: Level 1 - Full Code  Advance Directive and Living Will:      Power of :    POLST:

## 2022-12-26 NOTE — ASSESSMENT & PLAN NOTE
· Patient states that she was diagnosed with sarcoidosis secondary to miliary findings on her lung and biopsy revealed noncaseating granulomas  · Pre hospital patient was on prednisone 30 mg daily;  She was changed on admission to IV methylprednisolone 20 mg every 8hrs due to sepsis   · However, patient is noted to be hypertensive and it is reasonable to transition back to her prehospital prednisone dose  · She will need to get established with pulmonology in the area

## 2022-12-26 NOTE — CONSULTS
Consult - Urology   [place] Gerda 1965, 56 y o  female MRN: [id number]    Unit/Bed#: S -01 Encounter: [id number]    Bilateral hydronephrosis  Assessment & Plan  · S/p bilateral stent placement possibly in June 2022  · CT scan: Moderate right and mild left hydronephrosis with bilateral ureteral stents in place  Proximal 2 cm right stent encrusted, several calcifications along left stent may represent focal sites of encrustation or calculi  · Creat 0 67  · WBC 9 43 (24 37 yesterday)  · Will need to obtain outpatient urology records  · Eventual definitive stone management and stent removal which can be completed in the outpatient setting once medically stable    Sepsis ([facility] [address] )  Assessment & Plan  · Possibly secondary to UTI  · Recommend culture driven antibiotics  · Medical optimization per primary team  · WBC 9 43, improved from 24 37  · Afebrile, slightly hypertensive otherwise vital signs stable  · Lactic acid and procalcitonin normal today        Subjective:   63-year-old female who recently moved to this area was diagnosed with colon cancer and September underwent partial colectomy with ostomy formation  She is on chemotherapy with last infusion 12/20/2022  She also has a history of sarcoidosis on 30 mg of prednisone daily  Presented to the emergency department yesterday with left lower quadrant abdominal pain around her stoma and suprapubic area  Was also having chills and near syncopal episodes without fevers at home  Denies any nausea or vomiting  Reports able to urinate without difficulty however does have intermittent painful urination  Also reports some intermittent hematuria  She does have a history of kidney stones with bilateral stent placement in [place]  She is not sure when her stents were placed but possibly in June 2022 by Dr Pako Ny in [place]  She reports she was to schedule definitive stone management however has not followed up    She had a CT scan this admission that revealed colitis  Additionally she had moderate right and mild left hydronephrosis with bilateral ureteral stents in place  Proximal 2 cm of the right stent is encrusted  Several calcifications along the left stent may also represent encrustation or calculi in the ureter  She has additional nonobstructing calculi in the left kidney  She has no plans to return to [place] and would like to establish care with urology here  Review of Systems   Constitutional: Negative for activity change, appetite change, chills, fatigue, fever and unexpected weight change  HENT: Negative for facial swelling  Eyes: Negative for discharge  Respiratory: Negative  Negative for cough and shortness of breath  Cardiovascular: Negative for chest pain and leg swelling  Gastrointestinal: Positive for abdominal pain  Negative for abdominal distention, constipation, diarrhea, nausea and vomiting  Colostomy bag   Endocrine: Negative  Genitourinary: Positive for hematuria  Negative for decreased urine volume, difficulty urinating, dysuria, enuresis, flank pain, frequency, genital sores and urgency  Musculoskeletal: Negative for back pain and myalgias  Skin: Negative for pallor and rash  Allergic/Immunologic: Negative  Negative for immunocompromised state  Neurological: Negative for facial asymmetry and speech difficulty  Psychiatric/Behavioral: Negative for agitation and confusion  Objective:  Vitals: Blood pressure (!) 172/80, pulse 102, temperature 99 3 °F (37 4 °C), resp  rate 18, height 5' 1" (1 549 m), weight 68 8 kg (151 lb 10 8 oz), SpO2 97 %  ,Body mass index is 28 66 kg/m²  Physical Exam  Vitals and nursing note reviewed  Constitutional:       General: She is not in acute distress  Appearance: Normal appearance  She is normal weight  She is not ill-appearing, toxic-appearing or diaphoretic  HENT:      Head: Normocephalic     Pulmonary:      Effort: Pulmonary effort is normal  No respiratory distress  Abdominal:      General: Abdomen is flat  There is no distension  Palpations: Abdomen is soft  Tenderness: There is abdominal tenderness  There is no right CVA tenderness, left CVA tenderness, guarding or rebound  Comments: Colostomy bag with soft brown stool   Musculoskeletal:         General: No swelling  Cervical back: Normal range of motion  Right lower leg: No edema  Left lower leg: No edema  Skin:     General: Skin is warm and dry  Neurological:      General: No focal deficit present  Mental Status: She is alert and oriented to person, place, and time  Psychiatric:         Mood and Affect: Mood normal          Behavior: Behavior normal          Thought Content: Thought content normal          Judgment: Judgment normal          Imaging:  CT Abdomen: Pelvis     CT ABDOMEN AND PELVIS WITH IV CONTRAST     INDICATION:   LUQ abdominal pain  abdominal pain around colostomy site  Ureteral stents in place, suprapubic pain      COMPARISON:  None      TECHNIQUE:  CT examination of the abdomen and pelvis was performed  Axial, sagittal, and coronal 2D reformatted images were created from the source data and submitted for interpretation      Radiation dose length product (DLP) for this visit:  253 mGy-cm   This examination, like all CT scans performed in the [facility], was performed utilizing techniques to minimize radiation dose exposure, including the use of iterative   reconstruction and automated exposure control      IV Contrast:  100 mL of iohexol (OMNIPAQUE)  Enteric Contrast:  Enteric contrast was not administered      FINDINGS:     ABDOMEN     LOWER CHEST:  Bibasilar atelectasis with areas of septal thickening, greater on the left      LIVER/BILIARY TREE:  Diffuse hepatic steatosis  No focal mass      GALLBLADDER:  No calcified gallstones   No pericholecystic inflammatory change      SPLEEN:  Unremarkable      PANCREAS: Unremarkable      ADRENAL GLANDS:  Unremarkable      KIDNEYS/URETERS:  Indwelling bilateral ureteral stents, with moderate right and mild left hydronephrosis  The stent on the left is coiled in an upper pole calyx  Multiple calcifications along the stent best shown on series 601 images 96 and 100   represent either stones or focal areas of encrustation  The proximal right stent over a distance of approximately 2 cm is encrusted      Nonobstructing calculi in the left kidney measuring up to 4 mm in size      STOMACH AND BOWEL:  Left mid abdominal colostomy, with marked bowel wall thickening and mucosal hyperemia involving the distal transverse and descending colon to the level of the colostomy  The colon distal to the colostomy appears normal      The remainder of the bowel, stomach, and duodenum are unremarkable  There is a large amount of stool in the ascending colon and proximal transverse colon      APPENDIX:  No findings to suggest appendicitis      ABDOMINOPELVIC CAVITY:  No ascites  No pneumoperitoneum  No lymphadenopathy      VESSELS:  Unremarkable for patient's age      PELVIS     REPRODUCTIVE ORGANS:  Unremarkable for patient's age      URINARY BLADDER:  Unremarkable      ABDOMINAL WALL/INGUINAL REGIONS:  Unremarkable      OSSEOUS STRUCTURES:  No acute fracture or destructive osseous lesion      IMPRESSION:     Left mid abdominal colostomy with colitis with bowel involvement extending from the mid transverse colon to the level of the colostomy  The colon in the region of the colostomy is most severely involved  No pneumatosis or free air      Moderate right and mild left hydronephrosis with bilateral ureteral stents in place  The proximal 2 cm of the right stent is encrusted    Several calcifications along the left stent may also represent focal sites of encrustation or calculi in the ureter      Nonobstructing calculi in the left kidney      Marked hepatic steatosis      The study was marked in EPIC for immediate notification  Imaging reviewed - both report and images personally reviewed  Labs:  Recent Labs     12/25/22  0713 12/26/22  0505   WBC 24 37* 9 43     Recent Labs     12/25/22  0713 12/26/22  0505   HGB 12 8 11 2*       Recent Labs     12/25/22  0713 12/26/22  0505   CREATININE 0 86 0 67       Microbiology:  Urine culture pending, urine microscopic innumerable RBCs and 10-20 WBC    History:  Social History     Socioeconomic History   • Marital status: Single     Spouse name: Not on file   • Number of children: Not on file   • Years of education: Not on file   • Highest education level: Not on file   Occupational History   • Not on file   Tobacco Use   • Smoking status: Never   • Smokeless tobacco: Never   Substance and Sexual Activity   • Alcohol use: Not on file   • Drug use: Not on file   • Sexual activity: Not on file   Other Topics Concern   • Not on file   Social History Narrative   • Not on file     Social Determinants of Health     Financial Resource Strain: Not on file   Food Insecurity: Not on file   Transportation Needs: Not on file   Physical Activity: Not on file   Stress: Not on file   Social Connections: Not on file   Intimate Partner Violence: Not on file   Housing Stability: Not on file     Financial Resource Strain: Not on file   Food Insecurity: Not on file   Transportation Needs: Not on file   Physical Activity: Not on file   Stress: Not on file   Social Connections: Not on file   Intimate Partner Violence: Not on file   Housing Stability: Not on file      Diagnosis Date   • Colon cancer ([facility] [address] )    • Hemochromatosis, unspecified    • Kidney calculi    • Sarcoidosis      Past Surgical History:   Procedure Laterality Date   • COLON SURGERY     • URINARY SURGERY Bilateral     bilateral stents     No family history on file      The following portions of the patient's history were reviewed and updated as appropriate: allergies, current medications, past family history, past medical history, past social history, past surgical history and problem list    ANTHONY Cardoza  Date: 12/26/2022 Time: 11:27 AM

## 2022-12-26 NOTE — QUICK NOTE
Addendum I discontinued IV Flagyl and added oral vancomycin given positive C  difficile result    However, in light of patient's complaints of hematuria and urine culture being still in process, would continue IV ceftriaxone as well to cover for potential urinary tract infection

## 2022-12-27 ENCOUNTER — TELEPHONE (OUTPATIENT)
Dept: UROLOGY | Facility: CLINIC | Age: 57
End: 2022-12-27

## 2022-12-27 ENCOUNTER — PREP FOR PROCEDURE (OUTPATIENT)
Dept: UROLOGY | Facility: CLINIC | Age: 57
End: 2022-12-27

## 2022-12-27 VITALS
HEIGHT: 61 IN | DIASTOLIC BLOOD PRESSURE: 97 MMHG | HEART RATE: 88 BPM | BODY MASS INDEX: 29.05 KG/M2 | WEIGHT: 153.88 LBS | TEMPERATURE: 98.9 F | SYSTOLIC BLOOD PRESSURE: 135 MMHG | OXYGEN SATURATION: 97 % | RESPIRATION RATE: 20 BRPM

## 2022-12-27 DIAGNOSIS — N20.1 BILATERAL URETERAL CALCULI: Primary | ICD-10-CM

## 2022-12-27 PROBLEM — A04.72 C. DIFFICILE COLITIS: Status: ACTIVE | Noted: 2022-12-25

## 2022-12-27 LAB
ALBUMIN SERPL BCP-MCNC: 3.1 G/DL (ref 3.5–5)
ALP SERPL-CCNC: 112 U/L (ref 34–104)
ALT SERPL W P-5'-P-CCNC: 54 U/L (ref 7–52)
ANION GAP SERPL CALCULATED.3IONS-SCNC: 7 MMOL/L (ref 4–13)
AST SERPL W P-5'-P-CCNC: 25 U/L (ref 13–39)
BACTERIA UR CULT: NORMAL
BASOPHILS # BLD AUTO: 0.02 THOUSANDS/ÂΜL (ref 0–0.1)
BASOPHILS NFR BLD AUTO: 0 % (ref 0–1)
BILIRUB SERPL-MCNC: 0.36 MG/DL (ref 0.2–1)
BUN SERPL-MCNC: 14 MG/DL (ref 5–25)
CALCIUM ALBUM COR SERPL-MCNC: 8.7 MG/DL (ref 8.3–10.1)
CALCIUM SERPL-MCNC: 8 MG/DL (ref 8.4–10.2)
CHLORIDE SERPL-SCNC: 113 MMOL/L (ref 96–108)
CO2 SERPL-SCNC: 22 MMOL/L (ref 21–32)
CREAT SERPL-MCNC: 0.63 MG/DL (ref 0.6–1.3)
EOSINOPHIL # BLD AUTO: 0.03 THOUSAND/ÂΜL (ref 0–0.61)
EOSINOPHIL NFR BLD AUTO: 0 % (ref 0–6)
ERYTHROCYTE [DISTWIDTH] IN BLOOD BY AUTOMATED COUNT: 14.6 % (ref 11.6–15.1)
GFR SERPL CREATININE-BSD FRML MDRD: 101 ML/MIN/1.73SQ M
GLUCOSE SERPL-MCNC: 106 MG/DL (ref 65–140)
GLUCOSE SERPL-MCNC: 111 MG/DL (ref 65–140)
GLUCOSE SERPL-MCNC: 98 MG/DL (ref 65–140)
HCT VFR BLD AUTO: 30.1 % (ref 34.8–46.1)
HGB BLD-MCNC: 9.8 G/DL (ref 11.5–15.4)
IMM GRANULOCYTES # BLD AUTO: 0.17 THOUSAND/UL (ref 0–0.2)
IMM GRANULOCYTES NFR BLD AUTO: 2 % (ref 0–2)
LYMPHOCYTES # BLD AUTO: 0.81 THOUSANDS/ÂΜL (ref 0.6–4.47)
LYMPHOCYTES NFR BLD AUTO: 9 % (ref 14–44)
MCH RBC QN AUTO: 29.6 PG (ref 26.8–34.3)
MCHC RBC AUTO-ENTMCNC: 32.6 G/DL (ref 31.4–37.4)
MCV RBC AUTO: 91 FL (ref 82–98)
MONOCYTES # BLD AUTO: 0.36 THOUSAND/ÂΜL (ref 0.17–1.22)
MONOCYTES NFR BLD AUTO: 4 % (ref 4–12)
NEUTROPHILS # BLD AUTO: 8.17 THOUSANDS/ÂΜL (ref 1.85–7.62)
NEUTS SEG NFR BLD AUTO: 85 % (ref 43–75)
NRBC BLD AUTO-RTO: 0 /100 WBCS
PLATELET # BLD AUTO: 115 THOUSANDS/UL (ref 149–390)
PMV BLD AUTO: 10.4 FL (ref 8.9–12.7)
POTASSIUM SERPL-SCNC: 3.1 MMOL/L (ref 3.5–5.3)
PROT SERPL-MCNC: 5.6 G/DL (ref 6.4–8.4)
RBC # BLD AUTO: 3.31 MILLION/UL (ref 3.81–5.12)
SODIUM SERPL-SCNC: 142 MMOL/L (ref 135–147)
WBC # BLD AUTO: 9.56 THOUSAND/UL (ref 4.31–10.16)

## 2022-12-27 RX ORDER — VANCOMYCIN HYDROCHLORIDE 125 MG/1
125 CAPSULE ORAL 4 TIMES DAILY
Qty: 36 CAPSULE | Refills: 0 | Status: ON HOLD | OUTPATIENT
Start: 2022-12-27 | End: 2023-01-07

## 2022-12-27 RX ORDER — POTASSIUM CHLORIDE 20 MEQ/1
40 TABLET, EXTENDED RELEASE ORAL ONCE
Status: COMPLETED | OUTPATIENT
Start: 2022-12-27 | End: 2022-12-27

## 2022-12-27 RX ADMIN — Medication 125 MG: at 12:43

## 2022-12-27 RX ADMIN — CEFTRIAXONE SODIUM 1000 MG: 10 INJECTION, POWDER, FOR SOLUTION INTRAVENOUS at 10:03

## 2022-12-27 RX ADMIN — MORPHINE SULFATE 2 MG: 2 INJECTION, SOLUTION INTRAMUSCULAR; INTRAVENOUS at 00:33

## 2022-12-27 RX ADMIN — LABETALOL HYDROCHLORIDE 10 MG: 5 INJECTION, SOLUTION INTRAVENOUS at 07:05

## 2022-12-27 RX ADMIN — Medication 125 MG: at 06:08

## 2022-12-27 RX ADMIN — PREDNISONE 30 MG: 20 TABLET ORAL at 09:58

## 2022-12-27 RX ADMIN — POTASSIUM CHLORIDE 40 MEQ: 1500 TABLET, EXTENDED RELEASE ORAL at 09:58

## 2022-12-27 RX ADMIN — SODIUM CHLORIDE 100 ML/HR: 0.9 INJECTION, SOLUTION INTRAVENOUS at 09:56

## 2022-12-27 NOTE — ASSESSMENT & PLAN NOTE
· Noted to have tachycardia, leukocytosis, lactic acidosis and source of infection is c  Diff colitis    · Now improving  · Blood cultures negative  · Urine culture small amount of mixed contaminants  · Patient received 3 days of IV ceftriaxone

## 2022-12-27 NOTE — DISCHARGE INSTR - AVS FIRST PAGE
Dear Veto Eisenmenger,     It was our pleasure to care for you here at [facility]  It is our hope that we were always able to exceed the expected standards for your care during your stay  You were hospitalized due to c diff colitis  You were cared for on the 4th floor by Amelie Rivas PA-C under the service of Lige Rinne, MD with the [facility] who covers for your primary care physician (PCP), No primary care provider on file  , while you were hospitalized  If you have any questions or concerns related to this hospitalization, you may contact us at [phone]  For follow up as well as any medication refills, we recommend that you follow up with your primary care physician  A registered nurse will reach out to you by phone within a few days after your discharge to answer any additional questions that you may have after going home  However, at this time we provide for you here, the most important instructions / recommendations at discharge:     Notable Medication Adjustments -   Vancomycin pills 125 mg four times a day for 9 more days  Testing Required after Discharge -   Recheck basic labs (cbc, bmp) in 1 week with family doctor  Important follow up information -   Please monitor your blood pressure and talk to your family doctor about whether you need to start a blood pressure medicine  You will need follow-up with your family doctor, colorectal doctor and urologist--- either in [place] or get established locally  Other Instructions -   Ostomy care  Aim for diabetic diet  Please review this entire after visit summary as additional general instructions including medication list, appointments, activity, diet, any pertinent wound care, and other additional recommendations from your care team that may be provided for you        Sincerely,     Amelie Rivas PA-C

## 2022-12-27 NOTE — DISCHARGE SUMMARY
[facility]  Discharge- Debo Landry 1965, 56 y o  female MRN: [id number]  Unit/Bed#: S -01 Encounter: [id number]  Primary Care Provider: No primary care provider on file  Date and time admitted to hospital: 12/25/2022  6:46 AM    * C  difficile colitis  Assessment & Plan  · Patient with a history of colon cancer s/p partial colectomy with ostomy 2 months ago  Last chemotherapy on 12/20  Also on Xeloda  Recently moved to this area hence no previous records available  Needs to get established locally  · Presented with 2-day history of diffuse abdominal pain   · CT abdomen pelvis "Left mid abdominal colostomy with colitis with bowel involvement extending from the mid transverse colon to the level of the colostomy  The colon in the region of the colostomy is most severely involved  No pneumatosis or free air "  · C  diff positive  · Day #2 po vanco  · Colorectal surgery consult appreicated  · Improved with supportive measures including IV fluids and morphine for pain  · Noted to have formed stool in her ostomy  Does pass some material by rectum (see images)      Bilateral hydronephrosis  Assessment & Plan  · Patient with recent history of kidney stones with bilateral ureteral stents in place  · CT reveals moderate right and left hydro with bilateral ureteral stents and several areas of calcification along the stent which radiology states could represent sites of encrustation or calculi within the ureter  The stent on the left is coiled in an upper pole calyx  · Needs to establish care as well  · Patient was also noting hematuria  · Consulted urology, appreciate input--needs follow up          Hypokalemia  Assessment & Plan  · K 3 1, replete and recheck outpatient    Sepsis [facility])  Assessment & Plan  · Noted to have tachycardia, leukocytosis, lactic acidosis and source of infection is c  Diff colitis    · Now improving  · Blood cultures negative  · Urine culture small amount of mixed contaminants  · Patient received 3 days of IV ceftriaxone      Sarcoidosis  Assessment & Plan  · Patient states that she was diagnosed with sarcoidosis secondary to miliary findings on her lung and biopsy revealed noncaseating granulomas  · Pre hospital patient was on prednisone 30 mg daily; She was changed on admission to IV methylprednisolone 20 mg every 8hrs due to sepsis  Changed back to home PO dose  · She will need to get established with pulmonology in the area    Primary hypertension  Assessment & Plan  · Blood pressure elevated since admission, possibly exacerbated by pain  · Not on any medications at home for blood pressure  · IV labetalol as needed for SBP more than 160  · Needs outpatient follow-up to discuss possible initiation of antihypertensive agent    Impaired fasting glucose  Assessment & Plan  · A1c noted to be 6 4  · Diabetic diet    Other hemochromatosis  Assessment & Plan  · States that she was diagnosed with hemochromatosis-not on any treatment currently      Transaminitis  Assessment & Plan  · Likely secondary to hepatic steatosis, hemochromatosis, sepsis  Continue to monitor    Medical Problems     Resolved Problems  Date Reviewed: 12/27/2022   None       Discharging Physician / Practitioner: Keena Aviles PA-C  PCP: No primary care provider on file    Admission Date:   Admission Orders (From admission, onward)     Ordered        12/25/22 1104  1 [facility],[address] [place]  Once                      Discharge Date: 12/27/22    Consultations During Hospital Stay:  · Urology  · Colorectal therapy    Procedures Performed:   · CT A/P    Significant Findings / Test Results:   · As above    Incidental Findings:   · See CT    Test Results Pending at Discharge (will require follow up):   · none     Outpatient Tests Requested:  · Repeat CT    Complications:  none    Reason for Admission: abdominal pain    Hospital Course:   Shubham Lorenz is a 56 y o  female patient, not known to this area, with history of sarcoidosis on chronic prednisone, colon cancer status post recent colostomy, and hydronephrosis with urinary stents, who originally presented to the hospital on 12/25/2022 due to abdominal pain  CT showed evidence of colitis and she was placed empirically on ceftriaxone and Flagyl until her C  difficile sample came back positive and she was changed to oral vancomycin  She was seen by colorectal surgery and they are happy to help her establish care locally if she chooses  In addition she was seen by urology due to evidence of hydronephrosis on her CAT scan  Also they are happy to follow her in the office if she chooses to manage her stents  Patient was tolerating regular diet and improved with supportive care, and was stable for discharge home at this time  Of note her blood pressure was running somewhat elevated and should be followed as an outpatient along with repeat electrolyte monitoring  Please see above list of diagnoses and related plan for additional information  Condition at Discharge: stable    Discharge Day Visit / Exam:   Subjective: She reports she is doing much better at this time  She denies any abdominal pain and states that she is not needing morphine at this time  She is tolerating diet  She is having formed stool in ostomy  Vitals: Blood Pressure: 135/97 (12/27/22 1047)  Pulse: 88 (12/27/22 1047)  Temperature: 98 9 °F (37 2 °C) (12/27/22 0616)  Temp Source: Oral (12/25/22 0706)  Respirations: 20 (12/27/22 0616)  Height: 5' 1" (154 9 cm) (12/25/22 1138)  Weight - Scale: 69 8 kg (153 lb 14 1 oz) (12/27/22 0600)  SpO2: 97 % (12/27/22 1047)  Exam:   Physical Exam  Vitals reviewed  Constitutional:       General: She is not in acute distress  Appearance: Normal appearance  She is not ill-appearing, toxic-appearing or diaphoretic  Comments: Well-appearing   Eyes:      General: No scleral icterus  Right eye: No discharge  Left eye: No discharge  Conjunctiva/sclera: Conjunctivae normal    Cardiovascular:      Rate and Rhythm: Normal rate and regular rhythm  Heart sounds: No murmur heard  Pulmonary:      Effort: No respiratory distress  Breath sounds: No stridor  No wheezing or rhonchi  Abdominal:      General: Bowel sounds are normal  There is no distension  Palpations: Abdomen is soft  Tenderness: There is no abdominal tenderness  There is no guarding  Comments: solid stool in ostomy   Musculoskeletal:      Right lower leg: No edema  Left lower leg: No edema  Skin:     General: Skin is warm and dry  Coloration: Skin is not jaundiced or pale  Findings: No bruising, erythema, lesion or rash  Neurological:      General: No focal deficit present  Mental Status: She is alert  Mental status is at baseline  Comments: Awake alert interactive with no confusion   Psychiatric:         Mood and Affect: Mood normal          Thought Content: Thought content normal          Discussion with Family: Patient declined call to   Discharge instructions/Information to patient and family:   See after visit summary for information provided to patient and family  Provisions for Follow-Up Care:  See after visit summary for information related to follow-up care and any pertinent home health orders  Disposition:   Home    Planned Readmission:      Discharge Statement:  I spent 35 minutes discharging the patient  This time was spent on the day of discharge  I had direct contact with the patient on the day of discharge  Greater than 50% of the total time was spent examining patient, answering all patient questions, arranging and discussing plan of care with patient as well as directly providing post-discharge instructions  Additional time then spent on discharge activities  bedside nursing rounds performed and case was discussed with case management    Discharge Medications:  See after visit summary for reconciled discharge medications provided to patient and/or family        **Please Note: This note may have been constructed using a voice recognition system**

## 2022-12-27 NOTE — TELEPHONE ENCOUNTER
New patient with bilateral ureteral calculi, indwelling bilateral ureteral stents placed at outside institution  Please schedule the patient for right ureteroscopy, left ureteral stent exchange with me in the main OR, next available  Patient will require preop urine culture  I recommend main OR not ASC for her first case  Case request has been placed  She will need a second stage ureteroscopy on the left side after her right-sided stone burden is dressed      Thank you

## 2022-12-27 NOTE — ASSESSMENT & PLAN NOTE
· Blood pressure elevated since admission, possibly exacerbated by pain  · Not on any medications at home for blood pressure  · IV labetalol as needed for SBP more than 160  · Needs outpatient follow-up to discuss possible initiation of antihypertensive agent

## 2022-12-27 NOTE — ASSESSMENT & PLAN NOTE
· Patient with a history of colon cancer s/p partial colectomy with ostomy 2 months ago  Last chemotherapy on 12/20  Also on Xeloda  Recently moved to this area hence no previous records available  Needs to get established locally  · Presented with 2-day history of diffuse abdominal pain   · CT abdomen pelvis "Left mid abdominal colostomy with colitis with bowel involvement extending from the mid transverse colon to the level of the colostomy  The colon in the region of the colostomy is most severely involved  No pneumatosis or free air "  · C  diff positive  · Day #2 po vanco  · Colorectal surgery consult appreicated  · Improved with supportive measures including IV fluids and morphine for pain  · Noted to have formed stool in her ostomy    Does pass some material by rectum (see images)

## 2022-12-27 NOTE — UTILIZATION REVIEW
NOTIFICATION OF INPATIENT ADMISSION   AUTHORIZATION REQUEST   SERVICING FACILITY:   [address] Rui Boogie, [address] [place] [address]  Tax ID: [id number]  NPI: [id number]   ATTENDING PROVIDER:  Attending Name and NPI#: Priscila Dunham Md [[id number]]  Address: [address] [place] [address] Dr Rui Boogie, [address] [place] [address]  Phone: [phone]     ADMISSION INFORMATION:  Place of Service: Inpatient [address] [facility]  [address]  Place of Service Code: 21  Inpatient Admission Date/Time: 12/25/22 11:04 AM  Discharge Date/Time: No discharge date for patient encounter  Admitting Diagnosis Code/Description:  Hydronephrosis [N13 30]  Colitis [K52 9]  Altered mental status [R41 82]  Severe sepsis ([facility] [address] ) [A41 9, R65 20]     UTILIZATION REVIEW CONTACT:  Cecilio Patrick Utilization   Network Utilization Review Department  Phone: [phone]  Fax: [phone]  Email: [email or web address]  Contact for approvals/pending authorizations, clinical reviews, and discharge  PHYSICIAN ADVISORY SERVICES:  Medical Necessity Denial & [id number] Review  Phone: [phone]  Fax: [phone]  Email: [email or web address]

## 2022-12-27 NOTE — ASSESSMENT & PLAN NOTE
· Patient states that she was diagnosed with sarcoidosis secondary to miliary findings on her lung and biopsy revealed noncaseating granulomas  · Pre hospital patient was on prednisone 30 mg daily; She was changed on admission to IV methylprednisolone 20 mg every 8hrs due to sepsis   Changed back to home PO dose  · She will need to get established with pulmonology in the area

## 2022-12-27 NOTE — PROGRESS NOTES
Progress Note - Urology  [facility] 1965, 56 y o  female MRN: [id number]    Unit/Bed#: S -01 Encounter: [id number]      Assessment & Plan:  1  Bilateral hydronephrosis s/p bilateral stents 6/22  2  Sepsis  - S/p bilateral stent placement possibly in June 2022  - CT scan moderate right and mild left hydronephrosis with bilateral ureteral stents in place  Proximal 2 cm stent encrusted, several calcifications along left stent  - Cr 0 63, stable, at baseline  - WBC 9 56, stable  - Medical optimization and antibiosis per primary team  - Definitive stone management and stent removal can be completed in outpatient setting once medically stable  -Office will call patient to schedule follow-up  -Urology will sign off at this time  Please do not hesitate to reach out with any questions or concerns  Subjective:   Pt seen at bedside today  Patient reports difficulty falling asleep  Patient denies any flank pain, fever, nausea or vomiting  Review of Systems   Constitutional: Negative for chills, diaphoresis and fever  Respiratory: Negative for cough and shortness of breath  Cardiovascular: Negative for chest pain and palpitations  Gastrointestinal: Negative for abdominal distention, abdominal pain and vomiting  Genitourinary: Positive for hematuria  Negative for dysuria  Musculoskeletal: Negative for arthralgias and back pain  Skin: Negative for color change and rash  Neurological: Negative for seizures and syncope  All other systems reviewed and are negative  Objective:    Vitals: Blood pressure (!) 178/95, pulse 100, temperature 98 9 °F (37 2 °C), resp  rate 20, height 5' 1" (1 549 m), weight 69 8 kg (153 lb 14 1 oz), SpO2 97 %  ,Body mass index is 29 08 kg/m²  Physical Exam  Vitals reviewed  Constitutional:       General: She is not in acute distress  Appearance: Normal appearance  She is normal weight  She is not ill-appearing, toxic-appearing or diaphoretic     HENT: Head: Normocephalic and atraumatic  Right Ear: External ear normal       Left Ear: External ear normal       Nose: Nose normal       Mouth/Throat:      Mouth: Mucous membranes are moist    Eyes:      General: No scleral icterus  Conjunctiva/sclera: Conjunctivae normal    Cardiovascular:      Rate and Rhythm: Normal rate and regular rhythm  Pulses: Normal pulses  Heart sounds: Normal heart sounds  Pulmonary:      Effort: Pulmonary effort is normal       Breath sounds: Normal breath sounds  Abdominal:      General: Abdomen is flat  There is no distension  Palpations: Abdomen is soft  There is no mass  Tenderness: There is no abdominal tenderness  There is no right CVA tenderness, left CVA tenderness, guarding or rebound  Hernia: No hernia is present  Musculoskeletal:         General: Normal range of motion  Cervical back: Normal range of motion  Skin:     General: Skin is warm  Findings: No rash  Neurological:      General: No focal deficit present  Mental Status: She is alert and oriented to person, place, and time  Mental status is at baseline  Psychiatric:         Mood and Affect: Mood normal          Behavior: Behavior normal          Thought Content:  Thought content normal          Judgment: Judgment normal            Labs:  Recent Labs     12/25/22  0713 12/26/22  0505 12/27/22  0509   WBC 24 37* 9 43 9 56     Recent Labs     12/25/22  0713 12/26/22  0505 12/27/22  0509   HGB 12 8 11 2* 9 8*     Recent Labs     12/25/22  0713 12/26/22  0505 12/27/22  0509   HCT 38 4 34 1* 30 1*     Recent Labs     12/25/22  0713 12/26/22  0505 12/27/22  0509   CREATININE 0 86 0 67 0 63       History:  Past Medical History:   Diagnosis Date   • Colon cancer ([facility] )    • Hemochromatosis, unspecified    • Kidney calculi    • Sarcoidosis      Social History     Socioeconomic History   • Marital status: Single     Spouse name: Not on file   • Number of children: Not on file • Years of education: Not on file   • Highest education level: Not on file   Occupational History   • Not on file   Tobacco Use   • Smoking status: Never   • Smokeless tobacco: Never   Substance and Sexual Activity   • Alcohol use: Not on file   • Drug use: Not on file   • Sexual activity: Not on file   Other Topics Concern   • Not on file   Social History Narrative   • Not on file     Social Determinants of Health     Financial Resource Strain: Not on file   Food Insecurity: Not on file   Transportation Needs: Not on file   Physical Activity: Not on file   Stress: Not on file   Social Connections: Not on file   Intimate Partner Violence: Not on file   Housing Stability: Not on file     Past Surgical History:   Procedure Laterality Date   • COLON SURGERY     • URINARY SURGERY Bilateral     bilateral stents     No family history on file      Lana Thompson PA-C  Date: 12/27/2022 Time: 10:02 AM

## 2022-12-27 NOTE — UTILIZATION REVIEW
Initial Clinical Review    Admission: Date/Time/Statement:   Admission Orders (From admission, onward)     Ordered        12/25/22 1104  1 [facility] [place],[address] [place]  Once                      Orders Placed This Encounter   Procedures   • INPATIENT ADMISSION     Standing Status:   Standing     Number of Occurrences:   1     Order Specific Question:   Level of Care     Answer:   Med Surg [16]     Order Specific Question:   Estimated length of stay     Answer:   More than 2 Midnights     Order Specific Question:   Certification     Answer:   I certify that inpatient services are medically necessary for this patient for a duration of greater than two midnights  See H&P and MD Progress Notes for additional information about the patient's course of treatment  ED Arrival Information     Expected   -    Arrival   12/25/2022 06:36    Acuity   Emergent            Means of arrival   Walk-In    Escorted by   Spouse    Service   Hospitalist    Admission type   Emergency            Arrival complaint   Fatigue/Colostomy Site Pain           Chief Complaint   Patient presents with   • Altered Mental Status     Per family pt is undergoing chemo d/t colon cancer  States since yesterday the pt has been getting progressively weaker and c/o pain at her colostomy site  In triage pt not responding to questions or able to sit up on her own  Initial Presentation: 56 y o  female recently moved to area , hx hemochromatosis,  kidney stones with bilateral ureteral stents in place, sarcoidosis on prednisone,   recently dx w/ colon CA, now s/p partial colectomy w/ ostomy formation 2 months ago  Receiving chemo with last infusion on 12/20/2022 and also on Xeloda  Pt presents to ED from home with left lower quadrant abdominal pain around her stoma and suprapubic area  Chills this am  On exam, pt tachycardic, BP elevated , ostomy in place left upper quadrant with pink stoma and liquid brown stool in the ostomy bag as well as air    Tenderness to palpation with voluntary guarding surrounding ostomy site  Labs - elevated WBC 24, LA 2 8 , K 3 3, elevated LFT's  CT A/P shows colitis with bowel involvement from the mid transverse colon to the level of the colostomy and shows bilat hydronephrosis w/bilat stents in place, several areas of calcification along the left stent Pt given IVF, IV abx , IV analgesic, Iv antiemetic in ED  PT admitted as Inpatient with sepsis 2/2 acute colitis   PLan - IVabx- Ceftriaxone and Flagyl  Pain control, IVF, monitor lactic acid, f/u blood cx, f/u stool for C dif  PRN IV Labetalol SBP >160  Home po prednisone changed to IV Methylprednisolone   Hold off on urology consult at this time   Date: 12/26  Day 2:   Consults placed to colorectal and urology   Pt continues on IV ceftriaxone and flagyl  IVF infusing   Pt reports hematuria  Lovenox now on hold   Pt hypertensive, transition back to po prednisone   IV Labetalol x 1 today for elevated BP  Pt receiving prn IV Morphine for diffuse abdominal pain  Soft browen stool in ostomy   Glucose elevated   Check A1c and add accuchecks w/ SSI   Update - C diff +  IV Flagyl d/c'ed and started po vanco  Continue IV ceftriaxone to cover for potential UTI   Urology consult- WBC normalized today  LA and procal normal today   Urine microscopic innumerable RBCs and 10-20 WBC  Possible UTI   Recommend culture driven abx   Eventual definitive stone management and stent removal- can be completed in the outpatient setting once medically stable  Colorectal consult-C  difficile positive and I suspect this is the etiology of CT findings   Tolerating diet without issue, colostomy function has normalized  Pt has a follow-up visit with her surgeon and care team in [place] at [facility], next week    Defer to their care     ]    ED Triage Vitals   Temperature Pulse Respirations Blood Pressure SpO2   12/25/22 0706 12/25/22 0653 12/25/22 0653 12/25/22 0653 12/25/22 0653   98 7 °F (37 1 °C) (!) 114 (!) 24 (!) 183/87 100 %      Temp Source Heart Rate Source Patient Position - Orthostatic VS BP Location FiO2 (%)   12/25/22 0706 12/25/22 0653 12/25/22 0653 12/25/22 0653 --   Oral Monitor Lying Right arm       Pain Score       12/25/22 0738       3          Wt Readings from Last 1 Encounters:   12/26/22 68 8 kg (151 lb 10 8 oz)     Additional Vital Signs:   Date/Time Temp Pulse Resp BP MAP (mmHg) SpO2   12/26/22 22:08:29 98 6 °F (37 °C) 90 16 152/89 110 94 %   12/26/22 15:59:52 98 °F (36 7 °C) 103 18 162/85 111 96 %   12/26/22 11:46:35 -- 97 -- 147/87 107 95 %   12/26/22 0829 -- -- -- 172/80 Abnormal  -- --   12/26/22 08:20:17 99 3 °F (37 4 °C) 102 18 171/93 Abnormal  119 97 %   12/25/22 21:54:39 97 9 °F (36 6 °C) 84 18 152/86 108 97 %   12/25/22 1237 -- -- -- -- -- 95 %   12/25/22 12:33:34 99 8 °F (37 7 °C) 110 Abnormal  18 145/82 103 95 %   12/25/22 1138 -- 114 Abnormal  16 136/77 -- 98 %   12/25/22 1026 -- 105 16 113/59 -- 95 %   12/25/22 0929 -- 100 16 148/67 -- 95 %   12/25/22 0756 -- 100 18 190/95 Abnormal  -- 96 %     Pertinent Labs/Diagnostic Test Results:   CT abdomen pelvis with contrast   Final Result by Serene Concepcion MD (12/25 0940)      Left mid abdominal colostomy with colitis with bowel involvement extending from the mid transverse colon to the level of the colostomy  The colon in the region of the colostomy is most severely involved  No pneumatosis or free air  Moderate right and mild left hydronephrosis with bilateral ureteral stents in place  The proximal 2 cm of the right stent is encrusted  Several calcifications along the left stent may also represent focal sites of encrustation or calculi in the ureter  Nonobstructing calculi in the left kidney  Marked hepatic steatosis  The study was marked in [facility] for immediate notification              Workstation performed: [id number]           Results from last 7 days   Lab Units 12/25/22  0757   SARS-COV-2 Negative     Results from last 7 days   Lab Units 12/26/22  0505 12/25/22  1246 12/25/22  0713   WBC Thousand/uL 9 43  --  24 37*   HEMOGLOBIN g/dL 11 2*  --  12 8   HEMATOCRIT % 34 1*  --  38 4   PLATELETS Thousands/uL 103* 124* 221   NEUTROS ABS Thousands/µL 8 78*  --  21 28*         Results from last 7 days   Lab Units 12/26/22  0505 12/25/22  0713   SODIUM mmol/L 139 136   POTASSIUM mmol/L 3 6 3 3*   CHLORIDE mmol/L 109* 102   CO2 mmol/L 23 23   ANION GAP mmol/L 7 11   BUN mg/dL 20 28*   CREATININE mg/dL 0 67 0 86   EGFR ml/min/1 73sq m 99 76   CALCIUM mg/dL 8 0* 8 9     Results from last 7 days   Lab Units 12/26/22  0505 12/25/22  0713   AST U/L 38 54*   ALT U/L 70* 87*   ALK PHOS U/L 138* 165*   TOTAL PROTEIN g/dL 6 1* 5 9*   ALBUMIN g/dL 3 4* 3 6   TOTAL BILIRUBIN mg/dL 0 66 1 09*     Results from last 7 days   Lab Units 12/26/22  1558   POC GLUCOSE mg/dl 201*     Results from last 7 days   Lab Units 12/26/22  0505 12/25/22  0713   GLUCOSE RANDOM mg/dL 145* 142*         Results from last 7 days   Lab Units 12/26/22  1210   HEMOGLOBIN A1C % 6 4*   EAG mg/dl 137             Results from last 7 days   Lab Units 12/25/22  1246 12/25/22  1027 12/25/22  0713   HS TNI 0HR ng/L  --   --  21   HS TNI 2HR ng/L  --  22  --    HSTNI D2 ng/L  --  1  --    HS TNI 4HR ng/L 21  --   --    HSTNI D4 ng/L 0  --   --          Results from last 7 days   Lab Units 12/25/22  0713   PROTIME seconds 13 4   INR  1 00   PTT seconds 27         Results from last 7 days   Lab Units 12/26/22  0505 12/25/22  0713   PROCALCITONIN ng/ml 0 19 0 15     Results from last 7 days   Lab Units 12/26/22  0505 12/25/22  1246 12/25/22  1027 12/25/22  0713   LACTIC ACID mmol/L 0 7 1 0 1 4 2 8*           Results from last 7 days   Lab Units 12/25/22  1125   CLARITY UA  Clear   COLOR UA  Colorless   SPEC GRAV UA  1 049*   PH UA  7 5   GLUCOSE UA mg/dl Negative   KETONES UA mg/dl Negative   BLOOD UA  Large*   PROTEIN UA mg/dl Trace*   NITRITE UA  Negative BILIRUBIN UA  Negative   UROBILINOGEN UA (BE) mg/dl <2 0   LEUKOCYTES UA  Trace*   WBC UA /hpf 10-20*   RBC UA /hpf Innumerable*   BACTERIA UA /hpf Occasional   EPITHELIAL CELLS WET PREP /hpf Occasional     Results from last 7 days   Lab Units 12/25/22  0757   INFLUENZA A PCR  Negative   INFLUENZA B PCR  Negative   RSV PCR  Negative                 Results from last 7 days   Lab Units 12/25/22  1126   C DIFF TOXIN B BY PCR  Positive*     Results from last 7 days   Lab Units 12/25/22  1059   SALMONELLA SP PCR  None Detected   SHIGELLA SP/ENTEROINVASIVE E  COLI (EIEC)  None Detected   CAMPYLOBACTER SP (JEJUNI AND COLI)  None Detected   SHIGA TOXIN 1/SHIGA TOXIN 2  None Detected         Results from last 7 days   Lab Units 12/25/22  1125 12/25/22  0726   BLOOD CULTURE   --  No Growth at 24 hrs  No Growth at 24 hrs     URINE CULTURE  Culture too young- will reincubate  --                ED Treatment:   Medication Administration from 12/25/2022 0636 to 12/25/2022 1215       Date/Time Order Dose Route Action     12/25/2022 0738 EST morphine injection 4 mg 4 mg Intravenous Given     12/25/2022 0737 EST ondansetron (ZOFRAN) injection 4 mg 4 mg Intravenous Given     12/25/2022 0837 EST ceftriaxone (ROCEPHIN) 1 g/50 mL in dextrose IVPB 1,000 mg Intravenous New Bag     12/25/2022 0816 EST sodium chloride 0 9 % bolus 1,000 mL 1,000 mL Intravenous New Bag     12/25/2022 0826 EST iohexol (OMNIPAQUE) 350 MG/ML injection (SINGLE-DOSE) 100 mL 100 mL Intravenous Given     12/25/2022 1129 EST metroNIDAZOLE (FLAGYL) IVPB (premix) 500 mg 100 mL 500 mg Intravenous New Bag        Past Medical History:   Diagnosis Date   • [facility] ([facility] )    • Hemochromatosis, unspecified    • Kidney calculi    • Sarcoidosis      Present on Admission:  • Sepsis ([facility] )  • Malignant neoplasm of sigmoid colon ([facility] )  • Acute colitis  • Hypokalemia      Admitting Diagnosis: Hydronephrosis [N13 30]  Colitis [K52 9]  Altered mental status [R41 82]  Se[facility] ) [A41 9, R65 20]  Age/Sex: 56 y o  female  Admission Orders:  Scheduled Medications:   cefTRIAXone, 1,000 mg, Intravenous, Q24H  insulin lispro, 1-5 Units, Subcutaneous, TID AC  predniSONE, 30 mg, Oral, Daily  vancomycin, 125 mg, Oral, Q6H STANLEY    metroNIDAZOLE (FLAGYL) IVPB (premix) 500 mg 100 mL  Dose: 500 mg  Freq: Every 8 hours Route: IV  Last Dose: 500 mg (12/26/22 0333)  Start: 12/25/22 1045 End: 12/26/22 1109  methylPREDNISolone sodium succinate (Solu-MEDROL) injection 20 mg  Dose: 20 mg  Freq: Every 8 hours scheduled Route: IV  Start: 12/25/22 1400 End: 12/26/22 0955  enoxaparin (LOVENOX) subcutaneous injection 40 mg  Dose: 40 mg  Freq: Daily Route: SC  Start: 12/25/22 1230 End: 12/26/22 1008      Continuous IV Infusions:  sodium chloride, 100 mL/hr, Intravenous, Continuous      PRN Meds:  aluminum-magnesium hydroxide-simethicone, 30 mL, Oral, Q6H PRN  labetalol, 10 mg, Intravenous, Q6H PRN SBP more than 160 x1 12/26  morphine injection, 2 mg, Intravenous, Q4H PRN x2 12/26  ondansetron, 4 mg, Intravenous, Q6H PRN    isolation  Reg diet   SCD      IP CONSULT TO COLORECTAL SURGERY  IP CONSULT TO UROLOGY    Network Utilization Review Department  ATTENTION: Please call with any questions or concerns to [phone] and carefully listen to the prompts so that you are directed to the right person  All voicemails are confidential   Sheri Cho all requests for admission clinical reviews, approved or denied determinations and any other requests to dedicated fax number below belonging to the campus where the patient is receiving treatment   List of dedicated fax numbers for the Facilities:  [address] [place] [address] DENIALS (Administrative/Medical Necessity) [phone]   [address] [place] [address] (Maternity/NICU/Pediatrics) [phone]   [address] [phone]   [place] [phone]     [address] 150 [facility] [place] 435 [facility] [address] [facility] 28 [facility] 310 [facility] [place] [address] [phone]

## 2022-12-27 NOTE — ASSESSMENT & PLAN NOTE
· Patient with recent history of kidney stones with bilateral ureteral stents in place  · CT reveals moderate right and left hydro with bilateral ureteral stents and several areas of calcification along the stent which radiology states could represent sites of encrustation or calculi within the ureter  The stent on the left is coiled in an upper pole calyx    · Needs to establish care as well  · Patient was also noting hematuria  · Consulted urology, appreciate input--needs follow up

## 2022-12-28 LAB
ATRIAL RATE: 108 BPM
GLUCOSE SERPL-MCNC: 180 MG/DL (ref 65–140)
P AXIS: 63 DEGREES
PR INTERVAL: 152 MS
QRS AXIS: 27 DEGREES
QRSD INTERVAL: 70 MS
QT INTERVAL: 344 MS
QTC INTERVAL: 460 MS
T WAVE AXIS: 66 DEGREES
VENTRICULAR RATE: 108 BPM

## 2022-12-29 NOTE — UTILIZATION REVIEW
NOTIFICATION OF ADMISSION DISCHARGE   This is a Notification of Discharge from [address]  Please be advised that this patient has been discharge from our facility  Below you will find the admission and discharge date and time including the patient’s disposition  UTILIZATION REVIEW CONTACT:  Juliana Lebron MA  Utilization   Network Utilization Review Department  Phone: [phone] x carefully listen to the prompts  All voicemails are confidential   Email: [email or web address]     ADMISSION INFORMATION  PRESENTATION DATE: 12/25/2022  6:46 AM  OBERVATION ADMISSION DATE:   INPATIENT ADMISSION DATE: 12/25/22 11:04 AM   DISCHARGE DATE: 12/27/2022  1:15 PM   DISPOSITION:Home/Self Care    IMPORTANT INFORMATION:  Send all requests for admission clinical reviews, approved or denied determinations and any other requests to dedicated fax number below belonging to the campus where the patient is receiving treatment   List of dedicated fax numbers:  [address] DENIALS (Administrative/Medical Necessity) [phone]   [address] (Maternity/NICU/Pediatrics) [phone]   [facility] [phone]   [place] [address] [phone]   [address] [phone]   220 [facility] [phone]   90 [facility] [phone]   [address] [place] [address] 119 [phone]   [facility]  [phone]   4054 [facility] [phone]   412 [facility] 850 [address] [facility] [phone]

## 2022-12-30 LAB
BACTERIA BLD CULT: NORMAL
BACTERIA BLD CULT: NORMAL

## 2023-01-03 ENCOUNTER — TELEPHONE (OUTPATIENT)
Dept: FAMILY MEDICINE CLINIC | Facility: CLINIC | Age: 58
End: 2023-01-03

## 2023-01-03 NOTE — TELEPHONE ENCOUNTER
Patient's friend called in to establish new patient care for patient  He stated that she was just diagnosed with cancer, new to [place] and needs a new PCP  Please call to schedule an appointment

## 2023-01-06 ENCOUNTER — HOSPITAL ENCOUNTER (INPATIENT)
Facility: HOSPITAL | Age: 58
LOS: 1 days | Discharge: HOME | End: 2023-01-07
Attending: EMERGENCY MEDICINE | Admitting: INTERNAL MEDICINE

## 2023-01-06 ENCOUNTER — APPOINTMENT (EMERGENCY)
Dept: CT IMAGING | Facility: HOSPITAL | Age: 58
End: 2023-01-06

## 2023-01-06 DIAGNOSIS — N39.0 UTI (URINARY TRACT INFECTION): ICD-10-CM

## 2023-01-06 DIAGNOSIS — N30.01 ACUTE CYSTITIS WITH HEMATURIA: ICD-10-CM

## 2023-01-06 DIAGNOSIS — N13.30 HYDRONEPHROSIS, BILATERAL: Primary | ICD-10-CM

## 2023-01-06 PROBLEM — E27.8 ADRENAL NODULE (HCC): Status: ACTIVE | Noted: 2023-01-06

## 2023-01-06 PROBLEM — E27.9 ADRENAL NODULE (HCC): Status: ACTIVE | Noted: 2023-01-06

## 2023-01-06 LAB
ALBUMIN SERPL BCP-MCNC: 3.7 G/DL (ref 3.5–5)
ALP SERPL-CCNC: 178 U/L (ref 34–104)
ALT SERPL W P-5'-P-CCNC: 36 U/L (ref 7–52)
ANION GAP SERPL CALCULATED.3IONS-SCNC: 7 MMOL/L (ref 4–13)
AST SERPL W P-5'-P-CCNC: 30 U/L (ref 13–39)
BACTERIA UR QL AUTO: ABNORMAL /HPF
BASOPHILS # BLD AUTO: 0.02 THOUSANDS/ÂΜL (ref 0–0.1)
BASOPHILS NFR BLD AUTO: 0 % (ref 0–1)
BILIRUB SERPL-MCNC: 0.85 MG/DL (ref 0.2–1)
BILIRUB UR QL STRIP: NEGATIVE
BUN SERPL-MCNC: 22 MG/DL (ref 5–25)
CALCIUM SERPL-MCNC: 9.7 MG/DL (ref 8.4–10.2)
CHLORIDE SERPL-SCNC: 107 MMOL/L (ref 96–108)
CLARITY UR: CLEAR
CO2 SERPL-SCNC: 28 MMOL/L (ref 21–32)
COLOR UR: COLORLESS
CREAT SERPL-MCNC: 0.74 MG/DL (ref 0.6–1.3)
EOSINOPHIL # BLD AUTO: 0.05 THOUSAND/ÂΜL (ref 0–0.61)
EOSINOPHIL NFR BLD AUTO: 1 % (ref 0–6)
ERYTHROCYTE [DISTWIDTH] IN BLOOD BY AUTOMATED COUNT: 15.5 % (ref 11.6–15.1)
GFR SERPL CREATININE-BSD FRML MDRD: 91 ML/MIN/1.73SQ M
GLUCOSE SERPL-MCNC: 92 MG/DL (ref 65–140)
GLUCOSE UR STRIP-MCNC: ABNORMAL MG/DL
HCT VFR BLD AUTO: 30.4 % (ref 34.8–46.1)
HGB BLD-MCNC: 10.3 G/DL (ref 11.5–15.4)
HGB UR QL STRIP.AUTO: ABNORMAL
IMM GRANULOCYTES # BLD AUTO: 0.08 THOUSAND/UL (ref 0–0.2)
IMM GRANULOCYTES NFR BLD AUTO: 1 % (ref 0–2)
KETONES UR STRIP-MCNC: NEGATIVE MG/DL
LACTATE SERPL-SCNC: 0.9 MMOL/L (ref 0.5–2)
LEUKOCYTE ESTERASE UR QL STRIP: ABNORMAL
LIPASE SERPL-CCNC: 74 U/L (ref 11–82)
LYMPHOCYTES # BLD AUTO: 0.78 THOUSANDS/ÂΜL (ref 0.6–4.47)
LYMPHOCYTES NFR BLD AUTO: 12 % (ref 14–44)
MCH RBC QN AUTO: 30.7 PG (ref 26.8–34.3)
MCHC RBC AUTO-ENTMCNC: 33.9 G/DL (ref 31.4–37.4)
MCV RBC AUTO: 91 FL (ref 82–98)
MONOCYTES # BLD AUTO: 0.38 THOUSAND/ÂΜL (ref 0.17–1.22)
MONOCYTES NFR BLD AUTO: 6 % (ref 4–12)
MUCOUS THREADS UR QL AUTO: ABNORMAL
NEUTROPHILS # BLD AUTO: 5.08 THOUSANDS/ÂΜL (ref 1.85–7.62)
NEUTS SEG NFR BLD AUTO: 80 % (ref 43–75)
NITRITE UR QL STRIP: NEGATIVE
NON-SQ EPI CELLS URNS QL MICRO: ABNORMAL /HPF
NRBC BLD AUTO-RTO: 0 /100 WBCS
PH UR STRIP.AUTO: 7 [PH]
PLATELET # BLD AUTO: 221 THOUSANDS/UL (ref 149–390)
PMV BLD AUTO: 9.5 FL (ref 8.9–12.7)
POTASSIUM SERPL-SCNC: 3.1 MMOL/L (ref 3.5–5.3)
PROT SERPL-MCNC: 6.3 G/DL (ref 6.4–8.4)
PROT UR STRIP-MCNC: ABNORMAL MG/DL
RBC # BLD AUTO: 3.36 MILLION/UL (ref 3.81–5.12)
RBC #/AREA URNS AUTO: ABNORMAL /HPF
SODIUM SERPL-SCNC: 142 MMOL/L (ref 135–147)
SP GR UR STRIP.AUTO: 1.01 (ref 1–1.03)
UROBILINOGEN UR STRIP-ACNC: <2 MG/DL
WBC # BLD AUTO: 6.39 THOUSAND/UL (ref 4.31–10.16)
WBC #/AREA URNS AUTO: ABNORMAL /HPF

## 2023-01-06 RX ORDER — ENOXAPARIN SODIUM 100 MG/ML
40 INJECTION SUBCUTANEOUS DAILY
Status: DISCONTINUED | OUTPATIENT
Start: 2023-01-06 | End: 2023-01-07 | Stop reason: HOSPADM

## 2023-01-06 RX ORDER — PHENAZOPYRIDINE HYDROCHLORIDE 100 MG/1
100 TABLET, FILM COATED ORAL
Status: DISCONTINUED | OUTPATIENT
Start: 2023-01-06 | End: 2023-01-07 | Stop reason: HOSPADM

## 2023-01-06 RX ORDER — POTASSIUM CHLORIDE 20 MEQ/1
20 TABLET, EXTENDED RELEASE ORAL 2 TIMES DAILY
Status: DISCONTINUED | OUTPATIENT
Start: 2023-01-06 | End: 2023-01-07

## 2023-01-06 RX ORDER — ACETAMINOPHEN 325 MG/1
650 TABLET ORAL EVERY 6 HOURS PRN
Status: DISCONTINUED | OUTPATIENT
Start: 2023-01-06 | End: 2023-01-07 | Stop reason: HOSPADM

## 2023-01-06 RX ORDER — SODIUM CHLORIDE, SODIUM LACTATE, POTASSIUM CHLORIDE, CALCIUM CHLORIDE 600; 310; 30; 20 MG/100ML; MG/100ML; MG/100ML; MG/100ML
75 INJECTION, SOLUTION INTRAVENOUS CONTINUOUS
Status: DISPENSED | OUTPATIENT
Start: 2023-01-06 | End: 2023-01-07

## 2023-01-06 RX ADMIN — Medication 125 MG: at 11:51

## 2023-01-06 RX ADMIN — ENOXAPARIN SODIUM 40 MG: 40 INJECTION SUBCUTANEOUS at 11:51

## 2023-01-06 RX ADMIN — Medication 125 MG: at 21:31

## 2023-01-06 RX ADMIN — SODIUM CHLORIDE, SODIUM LACTATE, POTASSIUM CHLORIDE, AND CALCIUM CHLORIDE 75 ML/HR: .6; .31; .03; .02 INJECTION, SOLUTION INTRAVENOUS at 12:42

## 2023-01-06 RX ADMIN — CEFTRIAXONE SODIUM 1000 MG: 10 INJECTION, POWDER, FOR SOLUTION INTRAVENOUS at 11:51

## 2023-01-06 RX ADMIN — PHENAZOPYRIDINE 100 MG: 100 TABLET ORAL at 21:52

## 2023-01-06 RX ADMIN — POTASSIUM CHLORIDE 20 MEQ: 1500 TABLET, EXTENDED RELEASE ORAL at 11:51

## 2023-01-06 RX ADMIN — POTASSIUM CHLORIDE 20 MEQ: 1500 TABLET, EXTENDED RELEASE ORAL at 17:01

## 2023-01-06 RX ADMIN — SODIUM CHLORIDE 1000 ML: 0.9 INJECTION, SOLUTION INTRAVENOUS at 03:43

## 2023-01-06 RX ADMIN — MORPHINE SULFATE 2 MG: 2 INJECTION, SOLUTION INTRAMUSCULAR; INTRAVENOUS at 21:52

## 2023-01-06 RX ADMIN — IOHEXOL 100 ML: 350 INJECTION, SOLUTION INTRAVENOUS at 04:28

## 2023-01-06 NOTE — ED PROVIDER NOTES
History  Chief Complaint   Patient presents with   • Vaginal Pain     Pt reports b/l ureteral stents and has b/l kidney stones  Reports extreme vaginal pain from stones passing through, denies bladder pain  Hx lithotripsy   Last oxycodone take 10:20pm with no pain relief  Patient is a 56-year-old female with PMH of colon cancer s/p colostomy placement, renal calculi, bilateral urethral stents, and sarcoidosis presenting for evaluation of 2 to 3 days of vaginal pain  Patient notes she recently moved to the area was hospitalized here approximately 10 days ago  At that time she was found to have colitis and on day 2 of admission positive for C  difficile, for which she was given oral vancomycin  She notes finishing that oral vancomycin course yesterday  She notes that her stools have become more formed and she has not had any significant abdominal pain  She notes a history of bilateral urethral stents which in the past have caused a sharp/pressure-like pain in her vagina  For the past 2 to 3 days she has an increase in severity of this type of pain in her vagina and she is concerned for urethral stent dislodgment and/or complication  She denies associated fevers, chills, headaches, vision changes, sore throat, cough, CP/SOB, abdominal pain, N/V/D, and urinary frequency/urgency/hematuria/foul-smelling urine/dysuria  She has been prescribed oxycodone at time of discharge and took her last one last night  History provided by:  Patient and significant other   used: No        Prior to Admission Medications   Prescriptions Last Dose Informant Patient Reported?  Taking?   acetaminophen (TYLENOL) 325 mg tablet   Yes No   Sig: Take 650 mg by mouth every 6 (six) hours as needed for mild pain   cyanocobalamin (VITAMIN B-12) 1000 MCG tablet   Yes No   Sig: Take 1,000 mcg by mouth daily   predniSONE 10 mg tablet   Yes No   Sig: Take 10 mg by mouth daily at bedtime   predniSONE 20 mg tablet Yes No   Sig: Take 20 mg by mouth in the morning Takes in am   tamsulosin (Flomax) 0 4 mg   Yes No   Sig: Take 0 4 mg by mouth daily at bedtime   vancomycin (VANCOCIN) 125 MG capsule   No No   Sig: Take 1 capsule (125 mg total) by mouth 4 (four) times a day for 9 days      Facility-Administered Medications: None       Past Medical History:   Diagnosis Date   • Colon cancer ([facility] [address] )    • Hemochromatosis, unspecified    • Kidney calculi    • Sarcoidosis        Past Surgical History:   Procedure Laterality Date   • COLON SURGERY     • URINARY SURGERY Bilateral     bilateral stents       History reviewed  No pertinent family history  I have reviewed and agree with the history as documented  E-Cigarette/Vaping   • E-Cigarette Use Never User      E-Cigarette/Vaping Substances   • Nicotine No    • THC No    • CBD No      Social History     Tobacco Use   • Smoking status: Never     Passive exposure: Past   • Smokeless tobacco: Never   Vaping Use   • Vaping Use: Never used       Review of Systems   Constitutional: Negative for appetite change, chills, fatigue and fever  HENT: Negative for congestion, sore throat and trouble swallowing  Respiratory: Negative for cough and shortness of breath  Cardiovascular: Negative for chest pain and leg swelling  Gastrointestinal: Negative for abdominal distention, abdominal pain, blood in stool, diarrhea, nausea and vomiting  Genitourinary: Positive for vaginal pain  Negative for decreased urine volume, difficulty urinating, dysuria, enuresis, flank pain, frequency, hematuria, pelvic pain, urgency, vaginal bleeding and vaginal discharge  Musculoskeletal: Negative for back pain and gait problem  Neurological: Negative for dizziness, light-headedness and headaches  All other systems reviewed and are negative  Physical Exam  Physical Exam  Vitals and nursing note reviewed  Constitutional:       General: She is awake   She is in acute distress (Evidencing mild distress)  Appearance: Normal appearance  She is well-developed and normal weight  She is not ill-appearing, toxic-appearing or diaphoretic  HENT:      Head: Normocephalic and atraumatic  Jaw: There is normal jaw occlusion  Nose: Nose normal       Mouth/Throat:      Lips: Pink  No lesions  Mouth: Mucous membranes are moist       Pharynx: Oropharynx is clear  Eyes:      General: Lids are normal  Vision grossly intact  Gaze aligned appropriately  Extraocular Movements: Extraocular movements intact  Conjunctiva/sclera: Conjunctivae normal    Neck:      Trachea: Trachea and phonation normal  No abnormal tracheal secretions  Cardiovascular:      Rate and Rhythm: Normal rate  Pulses:           Radial pulses are 2+ on the right side and 2+ on the left side  Dorsalis pedis pulses are 2+ on the right side and 2+ on the left side  Heart sounds: Normal heart sounds, S1 normal and S2 normal  No murmur heard  Pulmonary:      Effort: Pulmonary effort is normal  No tachypnea or respiratory distress  Breath sounds: Normal breath sounds and air entry  No stridor, decreased air movement or transmitted upper airway sounds  No decreased breath sounds  Abdominal:      General: There is no distension  Palpations: Abdomen is soft  Tenderness: There is no abdominal tenderness  There is no right CVA tenderness, left CVA tenderness, guarding or rebound  Genitourinary:     Comments: Patient defers vaginal exam as she believes this is an internal nervelike pain related to her kidneys/ureters  She notes that she went through menopause and no longer has menses  She denies vaginal discharge and risk of STI  Musculoskeletal:         General: Normal range of motion  Cervical back: Normal range of motion and neck supple  Right lower leg: No edema  Left lower leg: No edema        Comments: ANDREW, 5/5 strength throughout, sensation intact, no focal joint swelling  Ambulatory with steady gait  Skin:     General: Skin is warm and dry  Capillary Refill: Capillary refill takes less than 2 seconds  Findings: No rash or wound  Neurological:      General: No focal deficit present  Mental Status: She is alert and oriented to person, place, and time  Mental status is at baseline  GCS: GCS eye subscore is 4  GCS verbal subscore is 5  GCS motor subscore is 6  Psychiatric:         Behavior: Behavior is cooperative           Vital Signs  ED Triage Vitals [01/06/23 0210]   Temperature Pulse Respirations Blood Pressure SpO2   98 3 °F (36 8 °C) 92 20 (!) 194/93 99 %      Temp Source Heart Rate Source Patient Position - Orthostatic VS BP Location FiO2 (%)   Oral Monitor -- -- --      Pain Score       --           Vitals:    01/06/23 0210 01/06/23 0536 01/06/23 0630   BP: (!) 194/93 159/77 156/77   Pulse: 92 84 84         Visual Acuity      ED Medications  Medications   sodium chloride 0 9 % bolus 1,000 mL (0 mL Intravenous Stopped 1/6/23 0443)   iohexol (OMNIPAQUE) 350 MG/ML injection (SINGLE-DOSE) 100 mL (100 mL Intravenous Given 1/6/23 0428)       Diagnostic Studies  Results Reviewed     Procedure Component Value Units Date/Time    Comprehensive metabolic panel [[id number]]  (Abnormal) Collected: 01/06/23 0340    Lab Status: Final result Specimen: Blood from Arm, Left Updated: 01/06/23 0415     Sodium 142 mmol/L      Potassium 3 1 mmol/L      Chloride 107 mmol/L      CO2 28 mmol/L      ANION GAP 7 mmol/L      BUN 22 mg/dL      Creatinine 0 74 mg/dL      Glucose 92 mg/dL      Calcium 9 7 mg/dL      AST 30 U/L      ALT 36 U/L      Alkaline Phosphatase 178 U/L      Total Protein 6 3 g/dL      Albumin 3 7 g/dL      Total Bilirubin 0 85 mg/dL      eGFR 91 ml/min/1 73sq m     Narrative:      [place] guidelines for Chronic Kidney Disease (CKD):   •  Stage 1 with normal or high GFR (GFR > 90 mL/min/1 73 square meters)  •  Stage 2 Mild CKD (GFR = 60-89 mL/min/1 73 square meters)  •  Stage 3A Moderate CKD (GFR = 45-59 mL/min/1 73 square meters)  •  Stage 3B Moderate CKD (GFR = 30-44 mL/min/1 73 square meters)  •  Stage 4 Severe CKD (GFR = 15-29 mL/min/1 73 square meters)  •  Stage 5 End Stage CKD (GFR <15 mL/min/1 73 square meters)  Note: GFR calculation is accurate only with a steady state creatinine    Lipase [[id number]]  (Normal) Collected: 01/06/23 0340    Lab Status: Final result Specimen: Blood from Arm, Left Updated: 01/06/23 0415     Lipase 74 u/L     Lactic acid [[id number]]  (Normal) Collected: 01/06/23 0340    Lab Status: Final result Specimen: Blood from Arm, Left Updated: 01/06/23 0415     LACTIC ACID 0 9 mmol/L     Narrative:      Result may be elevated if tourniquet was used during collection  Urine Microscopic [[id number]]  (Abnormal) Collected: 01/06/23 0345    Lab Status: Final result Specimen: Urine, Clean Catch Updated: 01/06/23 0406     RBC, UA Innumerable /hpf      WBC, UA Innumerable /hpf      Epithelial Cells Occasional /hpf      Bacteria, UA Occasional /hpf      MUCUS THREADS Occasional    Urine culture [[id number]] Collected: 01/06/23 0345    Lab Status:  In process Specimen: Urine, Clean Catch Updated: 01/06/23 0406    UA w Reflex to Microscopic w Reflex to Culture [[id number]]  (Abnormal) Collected: 01/06/23 0345    Lab Status: Final result Specimen: Urine, Clean Catch Updated: 01/06/23 0402     Color, UA Colorless     Clarity, UA Clear     Specific Gravity, UA 1 011     pH, UA 7 0     Leukocytes, UA Large     Nitrite, UA Negative     Protein,  (2+) mg/dl      Glucose,  (1/10%) mg/dl      Ketones, UA Negative mg/dl      Urobilinogen, UA <2 0 mg/dl      Bilirubin, UA Negative     Occult Blood, UA Large    CBC and differential [[id number]]  (Abnormal) Collected: 01/06/23 0340    Lab Status: Final result Specimen: Blood from Arm, Left Updated: 01/06/23 0359     WBC 6 39 Thousand/uL      RBC 3 36 Million/uL Hemoglobin 10 3 g/dL      Hematocrit 30 4 %      MCV 91 fL      MCH 30 7 pg      MCHC 33 9 g/dL      RDW 15 5 %      MPV 9 5 fL      Platelets 245 Thousands/uL      nRBC 0 /100 WBCs      Neutrophils Relative 80 %      Immat GRANS % 1 %      Lymphocytes Relative 12 %      Monocytes Relative 6 %      Eosinophils Relative 1 %      Basophils Relative 0 %      Neutrophils Absolute 5 08 Thousands/µL      Immature Grans Absolute 0 08 Thousand/uL      Lymphocytes Absolute 0 78 Thousands/µL      Monocytes Absolute 0 38 Thousand/µL      Eosinophils Absolute 0 05 Thousand/µL      Basophils Absolute 0 02 Thousands/µL                  CT abdomen pelvis w contrast   Final Result by Leandra Salazar MD (01/06 0600)      Progressive moderate right hydronephrosis   No perinephric collection  Stable encrustation of the proximal right nephroureteral stent as described above  Progressive mild left hydronephrosis and proximal hydroureter  No perinephric collection  Few proximal left ureteral calcifications, 3 mm and smaller have increased in number  At least a few of these calcifications are ureteral calculi given slightly    intervally decreased stone burden in the left renal lower pole  Stable punctate calculus or encrustation adjacent to the distal stent  Trace new bilateral renal pyelitis and proximal left ureteritis may be sequela of progressive obstruction  Advise correlation with UA to exclude superimposed UTI  Left nephrolithiasis, 4 mm and smaller  Prior colitis proximal to the colostomy has resolved  Minimal gallbladder wall thickening without calcified gallstones  This may be sequela of chronic hepatocellular disease, partially contracted gallbladder or nonspecific chronic inflammation  This could be followed up with nonemergent gallbladder    ultrasound  Stable 1 cm right adrenal nodule   Although its imaging features are indeterminate, it does not have suspicious imaging features (heterogeneity, necrosis, irregular margins), therefore this is likely benign, and can be followed by non-contrast abdomen CT    or MRI in 12 months  If patient has history of adrenal hyperfunction, consider biochemical evaluation  Adrenal recommendation based on institutional consensus and Journal of [employer] of Radiology 2017;14:6014-3957      This study demonstrates a significant  finding and was documented as such in [facility] for liaison and referring practitioner notification  Workstation performed: [id number]                    Procedures  Procedures         ED Course  ED Course as of 01/06/23 0805   [facility] Jan 06, 2023   0416 Comprehensive metabolic panel(!)  Mild hypokalemia, no THOR, normal random glucose, alk phos elevated above last measure 10 days ago, however ALT is improved and no evidence of transaminitis or elevated bilirubin to suggest acute hepatobiliary pathology   0417 Alkaline Phosphatase(!): 178  Elevated alk phos above last measure 10 days ago   0418 WBC: 6 39  No leukocytosis   0418 CBC and differential(!)  Anemia consistent with prior measuring 10 days ago   0418 RBC, UA(!): Innumerable  May be secondary to renal stone, urethral stent dislodgment, UTI, hemorrhagic cystitis   0419 WBC, UA(!): Innumerable  Increased likelihood for UTI, infectious process of bladder/ureters/kidneys  May be secondary to innumerable RBCs  Occasional bacteria seen makes as well as WBC being WDL makes acute infectious process less likely   0419 Lipase  Within defined limits, acute pancreatitis less likely   0525 Patient and her  and daughter updated on results including CMP, CBC, lipase, lactic acid, UA, and urine micro  Patient notes that she had 1 episode of pain when bearing down to urinate otherwise has been pain-free since arrival   She defers any pain management at this time as she is resting comfortably    I notified her that we are waiting for CT imaging to further work-up this new/worsening pain  Plan for discussion of CT results when they are confirmed and discussion about dispo planning  Patient with no further questions at this time  4009 Discussed CT findings with on call urology PA Jimbo Molina  She notes [facility] is appropriate and no need for transfer to \[facility] [place] [facility]\""  I discussed the pts reason for presentation, CT and UA results, and what documentation in the chart states regarding Dr Pita Mosqueda for this patient  9506 I updated the patient and her  on the recommendations from urology as well as the CT results  I discussed the concern for UTI given CT findings and innumerable bacteria on the urine microscopic  Plan for IV ceftriaxone and admission to the hospital for further IV antibiotics  Patient continues to deny pain and notes that she only had pain when straining to use the restroom  Her vitals have improved and she has been sleeping intermittently throughout the night  She does not have any further questions at this time and is agreeable with the plan  She is with improved appearance, in no acute distress, and stable for admission to [facility] [address] messaged regarding pt case                               SBIRT 22yo+    Flowsheet Row Most Recent Value   SBIRT (25 yo +)    In order to provide better care to our patients, we are screening all of our patients for alcohol and drug use  Would it be okay to ask you these screening questions? No Filed at: 01/06/2023 7675   ENRIQUE: How many times in the past year have you    Used an illegal drug or used a prescription medication for non-medical reasons?  Never Filed at: 01/06/2023 0256                    Medical Decision Making  DDx including but not limited to: renal colic, pyelonephritis, UTI, ureteral stent dislodgment, ureteral stent complication, GI etiology, appendicitis, diverticulitis, pancreatitis, cholecystitis, biliary colic    See ED course for further MDM and disposition discussion    Hydronephrosis, bilateral: acute illness or injury  UTI (urinary tract infection): acute illness or injury  Amount and/or Complexity of Data Reviewed  Labs: ordered  Decision-making details documented in ED Course  Radiology: ordered  Risk  Prescription drug management  Decision regarding hospitalization  Disposition  Final diagnoses:   Hydronephrosis, bilateral   UTI (urinary tract infection)     Time reflects when diagnosis was documented in both MDM as applicable and the Disposition within this note     Time User Action Codes Description Comment    1/6/2023  8:00 AM Shelley Joyner Add [N13 30] Hydronephrosis, bilateral     1/6/2023  8:01 AM Shelley Joyner Add [N39 0] UTI (urinary tract infection)       ED Disposition     ED Disposition   Admit    Condition   Stable    Date/Time   Fri Jan 6, 2023  8:00 AM    Comment   Case was discussed with [facility] [place] and the patient's admission status was agreed to be Admission Status: inpatient status to the service of Dr Horton Spearing   Follow-up Information    None         Patient's Medications   Discharge Prescriptions    No medications on file       No discharge procedures on file      PDMP Review       Value Time User    PDMP Reviewed  Yes 1/6/2023  3:02 AM Leila Zuniga MD          ED Provider  Electronically Signed by           ANTHONY Roach  01/06/23 8430

## 2023-01-06 NOTE — H&P
[facility]  H&P- Deon Villarreal 1965, 56 y o  female MRN: [id number]  Unit/Bed#: ED-31 Encounter: [id number]  Primary Care Provider: No primary care provider on file  Date and time admitted to hospital: 1/6/2023  2:31 AM    Acute cystitis with hematuria  Assessment & Plan  Complicated UTI patient has bilateral ureteral stents, ureteric calculi, now has painful urination  Denies any fever, chills  Give IV ceftriaxone, follow-up urine culture  Urology has been consulted as well  Bilateral hydronephrosis  Assessment & Plan  Status post bilateral ureteral stent, consult urology    C  difficile colitis  Assessment & Plan  Recent C  difficile colitis, treated with oral vancomycin, last dose was yesterday  Patient denies any further episodes of diarrhea, stool is formed  Provide vancomycin twice daily for prophylaxis while on antibiotics for UTI  Adrenal nodule (HCC)  Assessment & Plan  1 cm right adrenal nodule, outpatient follow-up, repeat imaging studies in 12 months  Discussed these findings with the patient  Hypokalemia  Assessment & Plan  Supplement potassium    Sarcoidosis  Assessment & Plan  Currently asymptomatic, continue on prednisone  Malignant neoplasm of sigmoid colon [facility])  Assessment & Plan  status post resection, currently patient has colostomy, postchemotherapy  Outpatient oncology follow-up  VTE Prophylaxis: Enoxaparin (Lovenox)  / sequential compression device   Code Status: Full code  POLST: There is no POLST form on file for this patient (pre-hospital)      Anticipated Length of Stay:  Patient will be admitted on an Inpatient basis with an anticipated length of stay of more than 2 midnights  Justification for Hospital Stay: Acute cystitis, lateral ureteral stents,    Total Time for Visit, including Counseling / Coordination of Care: 70 minutes    Greater than 50% of this total time spent on direct patient counseling and coordination of care     Chief Complaint:   Vaginal    History of Present Illness:    Dayton Reyes is a 56 y o  female who presents with past medical history of sarcoidosis, monitor colon cancer, colectomy and status post colostomy, recent C  difficile colitis, bilateral hydronephrosis, ureteral stents, nephrolithiasis presents with complaints of painful urination  Per patient symptoms started 2 to 3 days ago, not getting any better, painful urination, denies any suprapubic pain  Denies any fever, chills  Intermittent hematuria  Denies any chest pain, shortness of breath  Denies any vaginal discharge  Denies any diarrhea, abdominal pain  Completed vancomycin for C  difficile colitis yesterday        Review of Systems:    More than 10 system review of systems was performed, pertinent positive and negative findings mentioned as per history of present illness  Past Medical and Surgical History:     Past Medical History:   Diagnosis Date   • Colon cancer ([facility] [address] )    • Hemochromatosis, unspecified    • Kidney calculi    • Sarcoidosis        Past Surgical History:   Procedure Laterality Date   • COLON SURGERY     • URINARY SURGERY Bilateral     bilateral stents       Meds/Allergies:    Prior to Admission medications    Medication Sig Start Date End Date Taking?  Authorizing Provider   acetaminophen (TYLENOL) 325 mg tablet Take 650 mg by mouth every 6 (six) hours as needed for mild pain    Historical Provider, MD   cyanocobalamin (VITAMIN B-12) 1000 MCG tablet Take 1,000 mcg by mouth daily    Historical Provider, MD   predniSONE 10 mg tablet Take 10 mg by mouth daily at bedtime    Historical Provider, MD   predniSONE 20 mg tablet Take 20 mg by mouth in the morning Takes in am    Historical Provider, MD   tamsulosin (Flomax) 0 4 mg Take 0 4 mg by mouth daily at bedtime    Historical Provider, MD   vancomycin (VANCOCIN) 125 MG capsule Take 1 capsule (125 mg total) by mouth 4 (four) times a day for 9 days 12/27/22 1/5/23  [facility]     I have reviewed home medications with patient personally  Allergies: No Known Allergies    Social History:     Marital Status: Single     Social History     Substance and Sexual Activity   Alcohol Use None     Social History     Tobacco Use   Smoking Status Never   • Passive exposure: Past   Smokeless Tobacco Never     Social History     Substance and Sexual Activity   Drug Use Not on file       Family History:    non-contributory    Physical Exam:     Vitals:   Blood Pressure: 156/77 (01/06/23 0630)  Pulse: 84 (01/06/23 0630)  Temperature: 98 3 °F (36 8 °C) (01/06/23 0210)  Temp Source: Oral (01/06/23 0210)  Respirations: 18 (01/06/23 0536)  Height: 5' 1" (154 9 cm) (01/06/23 0536)  Weight - Scale: 66 kg (145 lb 8 1 oz) (01/06/23 0536)  SpO2: 99 % (01/06/23 0630)    Physical Exam    General appearance:  Patient not in acute distress  Eyes:  Pupils equal reacting to light  ENT:  Moist oral mucous membranes  CVS:  S1-S2 heard, regular rate and rhythm, no pedal edema  Chest:  Bilateral air entry present, clear to auscultation  Abdomen:  Soft, nontender, bowel sounds present, colostomy present  CNS:  No focal neurological deficits  Genitourinary: deferred  Skin:  No acute rash   psychiatric:  No psychosis  Musculoskeletal:  No joint deformities      Additional Data:     Lab Results: I have personally reviewed pertinent reports        Results from last 7 days   Lab Units 01/06/23  0340   WBC Thousand/uL 6 39   HEMOGLOBIN g/dL 10 3*   HEMATOCRIT % 30 4*   PLATELETS Thousands/uL 221   NEUTROS PCT % 80*   LYMPHS PCT % 12*   MONOS PCT % 6   EOS PCT % 1     Results from last 7 days   Lab Units 01/06/23  0340   SODIUM mmol/L 142   POTASSIUM mmol/L 3 1*   CHLORIDE mmol/L 107   CO2 mmol/L 28   BUN mg/dL 22   CREATININE mg/dL 0 74   ANION GAP mmol/L 7   CALCIUM mg/dL 9 7   ALBUMIN g/dL 3 7   TOTAL BILIRUBIN mg/dL 0 85   ALK PHOS U/L 178*   ALT U/L 36   AST U/L 30   GLUCOSE RANDOM mg/dL 92 Results from last 7 days   Lab Units 01/06/23  0340   LACTIC ACID mmol/L 0 9       Imaging: I have personally reviewed pertinent reports  CT abdomen pelvis w contrast   Final Result by Leandra Salazar MD (01/06 0600)      Progressive moderate right hydronephrosis   No perinephric collection  Stable encrustation of the proximal right nephroureteral stent as described above  Progressive mild left hydronephrosis and proximal hydroureter  No perinephric collection  Few proximal left ureteral calcifications, 3 mm and smaller have increased in number  At least a few of these calcifications are ureteral calculi given slightly    intervally decreased stone burden in the left renal lower pole  Stable punctate calculus or encrustation adjacent to the distal stent  Trace new bilateral renal pyelitis and proximal left ureteritis may be sequela of progressive obstruction  Advise correlation with UA to exclude superimposed UTI  Left nephrolithiasis, 4 mm and smaller  Prior colitis proximal to the colostomy has resolved  Minimal gallbladder wall thickening without calcified gallstones  This may be sequela of chronic hepatocellular disease, partially contracted gallbladder or nonspecific chronic inflammation  This could be followed up with nonemergent gallbladder    ultrasound  Stable 1 cm right adrenal nodule  Although its imaging features are indeterminate, it does not have suspicious imaging features (heterogeneity, necrosis, irregular margins), therefore this is likely benign, and can be followed by non-contrast abdomen CT    or MRI in 12 months  If patient has history of adrenal hyperfunction, consider biochemical evaluation         Adrenal recommendation based on institutional consensus and Journal of [employer] of Radiology 2017;14:7105-4707      This study demonstrates a significant  finding and was documented as such in Epic for liaison and referring practitioner notification  Workstation performed: [id number]             Allscripts / Epic Records Reviewed: Yes     ** Please Note: This note has been constructed using a voice recognition system   **

## 2023-01-06 NOTE — PLAN OF CARE
Problem: Potential for Falls  Goal: Patient will remain free of falls  Description: INTERVENTIONS:  - Educate patient/family on patient safety including physical limitations  - Instruct patient to call for assistance with activity   - Consult OT/PT to assist with strengthening/mobility   - Keep Call bell within reach  - Keep bed low and locked with side rails adjusted as appropriate  - Keep care items and personal belongings within reach  - Initiate and maintain comfort rounds  - Make Fall Risk Sign visible to staff  - Apply yellow socks and bracelet for high fall risk patients  - Consider moving patient to room near nurses station  Outcome: Progressing     Problem: PAIN - ADULT  Goal: Verbalizes/displays adequate comfort level or baseline comfort level  Description: Interventions:  - Encourage patient to monitor pain and request assistance  - Assess pain using appropriate pain scale  - Administer analgesics based on type and severity of pain and evaluate response  - Implement non-pharmacological measures as appropriate and evaluate response  - Consider cultural and social influences on pain and pain management  - Notify physician/advanced practitioner if interventions unsuccessful or patient reports new pain  Outcome: Progressing     Problem: INFECTION - ADULT  Goal: Absence or prevention of progression during hospitalization  Description: INTERVENTIONS:  - Assess and monitor for signs and symptoms of infection  - Monitor lab/diagnostic results  - Monitor all insertion sites, i e  indwelling lines, tubes, and drains  - Monitor endotracheal if appropriate and nasal secretions for changes in amount and color  - [place] appropriate cooling/warming therapies per order  - Administer medications as ordered  - Instruct and encourage patient and family to use good hand hygiene technique  - Identify and instruct in appropriate isolation precautions for identified infection/condition  Outcome: Progressing  Goal: Absence of fever/infection during neutropenic period  Description: INTERVENTIONS:  - Monitor WBC    Outcome: Progressing     Problem: DISCHARGE PLANNING  Goal: Discharge to home or other facility with appropriate resources  Description: INTERVENTIONS:  - Identify barriers to discharge w/patient and caregiver  - Arrange for needed discharge resources and transportation as appropriate  - Identify discharge learning needs (meds, wound care, etc )  - Arrange for interpretive services to assist at discharge as needed  - Refer to Case Management Department for coordinating discharge planning if the patient needs post-hospital services based on physician/advanced practitioner order or complex needs related to functional status, cognitive ability, or social support system  Outcome: Progressing     Problem: Knowledge Deficit  Goal: Patient/family/caregiver demonstrates understanding of disease process, treatment plan, medications, and discharge instructions  Description: Complete learning assessment and assess knowledge base    Interventions:  - Provide teaching at level of understanding  - Provide teaching via preferred learning methods  Outcome: Progressing

## 2023-01-06 NOTE — ASSESSMENT & PLAN NOTE
1 cm right adrenal nodule, outpatient follow-up, repeat imaging studies in 12 months  Discussed these findings with the patient

## 2023-01-06 NOTE — CONSULTS
Consult - Urology   Shemar Wise 1965, 56 y o  female MRN: [id number]    Unit/Bed#: ED-31 Encounter: [id number]      Assessment & Plan:  1  UTI  2  B/l ureteral stents with hydronephrosis   -Patient with progressive mild left hydronephrosis and proximal hydroureter, with few proximal left ureteral calcifications 3 mm and smaller  Progressive moderate right hydronephrosis with encrustation of proximal right stent beginning at UPJ extending over the length of stent approximately 2 5 cm  -CT results compared to previous study with similar findings, and slight progressive hydronephrosis  - UA with large leukocytes, nitrite negative  - WBC 6, stable  - Cr 0 74  - Afebrile  Pt hypertensive in ED   - Urine culture pending  - Pt with no abdominal pain, flank pain  - Pt reports pain with urination, burning   - Continue with outpatient plan for bilateral staged ureteroscopy  Pt will need pre-op urine cultures    -Continue with medical optimization and antibiosis per primary team    -No acute  surgical intervention required  - Will task office to set up outpatient follow up and follow up on scheduling procedure   -Urology will sign off at this time  Please do not hesitate to reach out with any questions or concerns    Subjective:   Patient is a 43-year-old female with past medical history of sarcoidosis, colon cancer status post colectomy status post colostomy, recent C  difficile colitis presenting to the ED with complaints of painful urination  Patiently recently hospitalized on 12/25 for C  difficile colitis and sepsis  Patient was seen by urology recommending outpatient bilateral stage ureteroscopy  Patient reports that the stents were placed in July at [facility]  She has had mild dysuria since the stents have been in place and hematuria  She reports over the past 2 to 3 days she has had painful urination  Discussed with patient outpatient procedure and close follow up   She denies any abdominal,  flank pain, fevers, chills, chest pain, SOB  Review of Systems   Constitutional: Negative for chills, diaphoresis and fever  Respiratory: Negative for cough and shortness of breath  Cardiovascular: Negative for chest pain and palpitations  Gastrointestinal: Negative for abdominal distention, abdominal pain and vomiting  Genitourinary: Positive for difficulty urinating and dysuria  Negative for flank pain and hematuria  Musculoskeletal: Negative for arthralgias and back pain  Skin: Negative for color change and rash  Neurological: Negative for seizures and syncope  All other systems reviewed and are negative  Objective:  Vitals: Blood pressure 159/76, pulse 79, temperature 98 3 °F (36 8 °C), temperature source Oral, resp  rate 18, height 5' 1" (1 549 m), weight 66 kg (145 lb 8 1 oz), SpO2 100 %  ,Body mass index is 27 49 kg/m²  Physical Exam  Vitals reviewed  Constitutional:       General: She is not in acute distress  Appearance: Normal appearance  She is normal weight  She is not ill-appearing, toxic-appearing or diaphoretic  HENT:      Head: Normocephalic and atraumatic  Right Ear: External ear normal       Left Ear: External ear normal       Nose: Nose normal       Mouth/Throat:      Mouth: Mucous membranes are moist    Eyes:      General: No scleral icterus  Conjunctiva/sclera: Conjunctivae normal    Cardiovascular:      Rate and Rhythm: Normal rate and regular rhythm  Pulses: Normal pulses  Heart sounds: Normal heart sounds  Pulmonary:      Effort: Pulmonary effort is normal       Breath sounds: Normal breath sounds  Abdominal:      General: Abdomen is flat  There is no distension  Palpations: Abdomen is soft  There is no mass  Tenderness: There is no abdominal tenderness  There is no right CVA tenderness, left CVA tenderness, guarding or rebound  Hernia: No hernia is present  Comments: Colostomy in place  Musculoskeletal:         General: Normal range of motion  Cervical back: Normal range of motion  Skin:     General: Skin is warm  Findings: No rash  Neurological:      General: No focal deficit present  Mental Status: She is alert and oriented to person, place, and time  Mental status is at baseline  Psychiatric:         Mood and Affect: Mood normal          Behavior: Behavior normal          Thought Content: Thought content normal          Judgment: Judgment normal          Imaging:  CT ABDOMEN AND PELVIS WITH IV CONTRAST     INDICATION:   pain, b/l ureteral stents      COMPARISON:  CT abdomen and pelvis 12/25/2022     TECHNIQUE:  CT examination of the abdomen and pelvis was performed  In addition to portal venous phase postcontrast scanning through the abdomen and pelvis, delayed phase postcontrast scanning was performed through the upper abdominal viscera  Axial,   sagittal, and coronal 2D reformatted images were created from the source data and submitted for interpretation      Radiation dose length product (DLP) for this visit:  720 mGy-cm   This examination, like all CT scans performed in the [facility], was performed utilizing techniques to minimize radiation dose exposure, including the use of iterative   reconstruction and automated exposure control      IV Contrast:  100 mL of iohexol (OMNIPAQUE)  350  Enteric Contrast:  Enteric contrast was not administered      FINDINGS:     ABDOMEN     LOWER CHEST:  Minimal left lower lobe juxtapleural platelike scarring and/or subsegmental atelectasis similar to prior study  Tiny sliding hiatal hernia  6 mm inferior left breast superficial nodule, stable image 6 series 301      LIVER/BILIARY TREE:  Liver is diffusely decreased in density consistent with fatty change  No CT evidence of suspicious hepatic mass  Normal hepatic contours  No biliary dilatation      GALLBLADDER:  No calcified gallstones   Trace gallbladder wall thickening      SPLEEN:  Unremarkable      PANCREAS:  Unremarkable      ADRENAL GLANDS:  1 cm nodule in the lateral limb of the right adrenal gland, stable  Unremarkable left adrenal gland      KIDNEYS/URETERS:  Bilateral nephroureteral stents in place with proximal right pigtail in the renal pelvis and proximal left pigtail in the left upper pole calyx  Encrustation of the proximal right stent beginning at the UPJ extending over a length of   approximately 2 5 cm unchanged when measured in the same fashion  Progressive moderate right hydronephrosis and mild left hydronephrosis and proximal hydroureter  Trace new bilateral renal pelvic, right greater than left and proximal left ureteral   urothelial hyperemia  Few discrete calcifications adjacent to the proximal left ureter, 3 mm and smaller have increased in number  Stable 1 to 2 mm calcification adjacent to the distal stent at the level of the mid pelvis image 91 series 219      Left renal calculi, 4 mm and smaller with slightly decreased stone burden in the lower pole  No perinephric collection      STOMACH AND BOWEL:  Left ventral mid abdominal colostomy  Prior colonic thickening and hyperemia proximal to the colostomy has resolved allowing for nondistention of the loop coursing through the abdominal wall defect  Colon distal to the colostomy is   nondistended limiting its evaluation  Moderate stool in the colon  No bowel obstruction      APPENDIX:  A normal appendix was visualized      ABDOMINOPELVIC CAVITY:  No ascites  No pneumoperitoneum  No lymphadenopathy      VESSELS:  Aortoiliac calcification  No aneurysm      PELVIS     REPRODUCTIVE ORGANS:  Unremarkable for patient's age      URINARY BLADDER:  Distal pigtails of bilateral nephroureteral stents in the bladder lumen  Bladder otherwise unremarkable      ABDOMINAL WALL/INGUINAL REGIONS:  Small fat-containing umbilical hernia   Tiny fat-containing right inguinal hernia      OSSEOUS STRUCTURES:  No acute fracture or osseous destructive lesion identified  Mild degenerative changes of the spine  Transitional lumbosacral vertebra attributed to sacralized L5 vertebral body      IMPRESSION:     Progressive moderate right hydronephrosis   No perinephric collection  Stable encrustation of the proximal right nephroureteral stent as described above      Progressive mild left hydronephrosis and proximal hydroureter  No perinephric collection  Few proximal left ureteral calcifications, 3 mm and smaller have increased in number  At least a few of these calcifications are ureteral calculi given slightly   intervally decreased stone burden in the left renal lower pole  Stable punctate calculus or encrustation adjacent to the distal stent      Trace new bilateral renal pyelitis and proximal left ureteritis may be sequela of progressive obstruction  Advise correlation with UA to exclude superimposed UTI      Left nephrolithiasis, 4 mm and smaller      Prior colitis proximal to the colostomy has resolved      Minimal gallbladder wall thickening without calcified gallstones  This may be sequela of chronic hepatocellular disease, partially contracted gallbladder or nonspecific chronic inflammation  This could be followed up with nonemergent gallbladder   ultrasound      Stable 1 cm right adrenal nodule  Although its imaging features are indeterminate, it does not have suspicious imaging features (heterogeneity, necrosis, irregular margins), therefore this is likely benign, and can be followed by non-contrast abdomen CT   or MRI in 12 months    If patient has history of adrenal hyperfunction, consider biochemical evaluation       Adrenal recommendation based on institutional consensus and Journal of [employer] of Radiology 4282;97:[id number]     This study demonstrates a significant  finding and was documented as such in [facility] for liaison and referring practitioner notification            Labs:  Recent Labs     01/06/23  0340   WBC 6 39     Recent Labs     01/06/23  0340   HGB 10 3*     Recent Labs     01/06/23  0340   CREATININE 0 74       History:  Social History     Socioeconomic History   • Marital status: Single     Spouse name: None   • Number of children: None   • Years of education: None   • Highest education level: None   Occupational History   • None   Tobacco Use   • Smoking status: Never     Passive exposure: Past   • Smokeless tobacco: Never   Vaping Use   • Vaping Use: Never used   Substance and Sexual Activity   • Alcohol use: None   • Drug use: None   • Sexual activity: None   Other Topics Concern   • None   Social History Narrative   • None     Social Determinants of Health     Financial Resource Strain: Not on file   Food Insecurity: Not on file   Transportation Needs: Not on file   Physical Activity: Not on file   Stress: Not on file   Social Connections: Not on file   Intimate Partner Violence: Not on file   Housing Stability: Not on file     Past Medical History:   Diagnosis Date   • Colon cancer ([facility] [address] )    • Hemochromatosis, unspecified    • Kidney calculi    • Sarcoidosis      Past Surgical History:   Procedure Laterality Date   • COLON SURGERY     • URINARY SURGERY Bilateral     bilateral stents     History reviewed  No pertinent family history      Marleny Andre PA-C  Date: 1/6/2023 Time: 2:28 PM

## 2023-01-06 NOTE — ASSESSMENT & PLAN NOTE
status post resection, currently patient has colostomy, postchemotherapy  Outpatient oncology follow-up

## 2023-01-06 NOTE — ASSESSMENT & PLAN NOTE
Complicated UTI patient has bilateral ureteral stents, ureteric calculi, now has painful urination  Denies any fever, chills  Give IV ceftriaxone, follow-up urine culture  Urology has been consulted as well

## 2023-01-06 NOTE — ASSESSMENT & PLAN NOTE
Recent C  difficile colitis, treated with oral vancomycin, last dose was yesterday  Patient denies any further episodes of diarrhea, stool is formed  Provide vancomycin twice daily for prophylaxis while on antibiotics for UTI

## 2023-01-07 VITALS
HEIGHT: 61 IN | HEART RATE: 101 BPM | RESPIRATION RATE: 18 BRPM | DIASTOLIC BLOOD PRESSURE: 76 MMHG | WEIGHT: 139.33 LBS | SYSTOLIC BLOOD PRESSURE: 146 MMHG | OXYGEN SATURATION: 99 % | TEMPERATURE: 97.8 F | BODY MASS INDEX: 26.31 KG/M2

## 2023-01-07 LAB
ANION GAP SERPL CALCULATED.3IONS-SCNC: 7 MMOL/L (ref 4–13)
BACTERIA UR CULT: NORMAL
BASOPHILS # BLD AUTO: 0.07 THOUSANDS/ÂΜL (ref 0–0.1)
BASOPHILS NFR BLD AUTO: 1 % (ref 0–1)
BUN SERPL-MCNC: 15 MG/DL (ref 5–25)
CALCIUM SERPL-MCNC: 8.9 MG/DL (ref 8.4–10.2)
CHLORIDE SERPL-SCNC: 108 MMOL/L (ref 96–108)
CO2 SERPL-SCNC: 27 MMOL/L (ref 21–32)
CREAT SERPL-MCNC: 0.76 MG/DL (ref 0.6–1.3)
EOSINOPHIL # BLD AUTO: 0.07 THOUSAND/ÂΜL (ref 0–0.61)
EOSINOPHIL NFR BLD AUTO: 1 % (ref 0–6)
ERYTHROCYTE [DISTWIDTH] IN BLOOD BY AUTOMATED COUNT: 17.2 % (ref 11.6–15.1)
GFR SERPL CREATININE-BSD FRML MDRD: 88 ML/MIN/1.73SQ M
GLUCOSE SERPL-MCNC: 89 MG/DL (ref 65–140)
HCT VFR BLD AUTO: 33.5 % (ref 34.8–46.1)
HGB BLD-MCNC: 11.3 G/DL (ref 11.5–15.4)
IMM GRANULOCYTES # BLD AUTO: 0.13 THOUSAND/UL (ref 0–0.2)
IMM GRANULOCYTES NFR BLD AUTO: 2 % (ref 0–2)
LYMPHOCYTES # BLD AUTO: 1.09 THOUSANDS/ÂΜL (ref 0.6–4.47)
LYMPHOCYTES NFR BLD AUTO: 16 % (ref 14–44)
MCH RBC QN AUTO: 31.7 PG (ref 26.8–34.3)
MCHC RBC AUTO-ENTMCNC: 33.7 G/DL (ref 31.4–37.4)
MCV RBC AUTO: 94 FL (ref 82–98)
MONOCYTES # BLD AUTO: 0.51 THOUSAND/ÂΜL (ref 0.17–1.22)
MONOCYTES NFR BLD AUTO: 7 % (ref 4–12)
NEUTROPHILS # BLD AUTO: 5 THOUSANDS/ÂΜL (ref 1.85–7.62)
NEUTS SEG NFR BLD AUTO: 73 % (ref 43–75)
NRBC BLD AUTO-RTO: 0 /100 WBCS
PLATELET # BLD AUTO: 242 THOUSANDS/UL (ref 149–390)
PMV BLD AUTO: 9.1 FL (ref 8.9–12.7)
POTASSIUM SERPL-SCNC: 3.2 MMOL/L (ref 3.5–5.3)
RBC # BLD AUTO: 3.57 MILLION/UL (ref 3.81–5.12)
SODIUM SERPL-SCNC: 142 MMOL/L (ref 135–147)
WBC # BLD AUTO: 6.87 THOUSAND/UL (ref 4.31–10.16)

## 2023-01-07 RX ORDER — VANCOMYCIN HYDROCHLORIDE 125 MG/1
125 CAPSULE ORAL 2 TIMES DAILY
Qty: 6 CAPSULE | Refills: 0 | Status: SHIPPED | OUTPATIENT
Start: 2023-01-07 | End: 2023-01-10

## 2023-01-07 RX ORDER — TAMSULOSIN HYDROCHLORIDE 0.4 MG/1
0.4 CAPSULE ORAL
Status: DISCONTINUED | OUTPATIENT
Start: 2023-01-07 | End: 2023-01-07 | Stop reason: HOSPADM

## 2023-01-07 RX ORDER — TAMSULOSIN HYDROCHLORIDE 0.4 MG/1
0.4 CAPSULE ORAL
Qty: 30 CAPSULE | Refills: 0 | Status: SHIPPED | OUTPATIENT
Start: 2023-01-07 | End: 2023-01-07 | Stop reason: SDUPTHER

## 2023-01-07 RX ORDER — TAMSULOSIN HYDROCHLORIDE 0.4 MG/1
0.4 CAPSULE ORAL
Qty: 30 CAPSULE | Refills: 0 | Status: SHIPPED | OUTPATIENT
Start: 2023-01-07

## 2023-01-07 RX ORDER — PREDNISONE 20 MG/1
20 TABLET ORAL DAILY
Status: DISCONTINUED | OUTPATIENT
Start: 2023-01-07 | End: 2023-01-07 | Stop reason: HOSPADM

## 2023-01-07 RX ORDER — PREDNISONE 10 MG/1
10 TABLET ORAL
Status: DISCONTINUED | OUTPATIENT
Start: 2023-01-07 | End: 2023-01-07 | Stop reason: HOSPADM

## 2023-01-07 RX ORDER — PHENAZOPYRIDINE HYDROCHLORIDE 100 MG/1
200 TABLET, FILM COATED ORAL 3 TIMES DAILY PRN
Qty: 30 TABLET | Refills: 0 | Status: SHIPPED | OUTPATIENT
Start: 2023-01-07

## 2023-01-07 RX ORDER — CEPHALEXIN 500 MG/1
500 CAPSULE ORAL EVERY 6 HOURS SCHEDULED
Qty: 4 CAPSULE | Refills: 0 | Status: SHIPPED | OUTPATIENT
Start: 2023-01-08 | End: 2023-01-09

## 2023-01-07 RX ORDER — PHENAZOPYRIDINE HYDROCHLORIDE 100 MG/1
200 TABLET, FILM COATED ORAL 3 TIMES DAILY PRN
Qty: 30 TABLET | Refills: 0 | Status: SHIPPED | OUTPATIENT
Start: 2023-01-07 | End: 2023-01-07 | Stop reason: SDUPTHER

## 2023-01-07 RX ORDER — VANCOMYCIN HYDROCHLORIDE 125 MG/1
125 CAPSULE ORAL 2 TIMES DAILY
Qty: 6 CAPSULE | Refills: 0 | Status: SHIPPED | OUTPATIENT
Start: 2023-01-07 | End: 2023-01-07 | Stop reason: SDUPTHER

## 2023-01-07 RX ORDER — POTASSIUM CHLORIDE 20 MEQ/1
40 TABLET, EXTENDED RELEASE ORAL ONCE
Status: COMPLETED | OUTPATIENT
Start: 2023-01-07 | End: 2023-01-07

## 2023-01-07 RX ORDER — CEFPODOXIME PROXETIL 200 MG/1
200 TABLET, FILM COATED ORAL 2 TIMES DAILY
Qty: 2 TABLET | Refills: 0 | Status: SHIPPED | OUTPATIENT
Start: 2023-01-08 | End: 2023-01-07

## 2023-01-07 RX ADMIN — ENOXAPARIN SODIUM 40 MG: 40 INJECTION SUBCUTANEOUS at 08:41

## 2023-01-07 RX ADMIN — PHENAZOPYRIDINE 100 MG: 100 TABLET ORAL at 11:46

## 2023-01-07 RX ADMIN — CEFTRIAXONE SODIUM 1000 MG: 10 INJECTION, POWDER, FOR SOLUTION INTRAVENOUS at 10:56

## 2023-01-07 RX ADMIN — POTASSIUM CHLORIDE 20 MEQ: 1500 TABLET, EXTENDED RELEASE ORAL at 08:41

## 2023-01-07 RX ADMIN — SODIUM CHLORIDE, SODIUM LACTATE, POTASSIUM CHLORIDE, AND CALCIUM CHLORIDE 75 ML/HR: .6; .31; .03; .02 INJECTION, SOLUTION INTRAVENOUS at 07:15

## 2023-01-07 RX ADMIN — POTASSIUM CHLORIDE 40 MEQ: 1500 TABLET, EXTENDED RELEASE ORAL at 11:46

## 2023-01-07 RX ADMIN — CYANOCOBALAMIN TAB 500 MCG 1000 MCG: 500 TAB at 09:23

## 2023-01-07 RX ADMIN — PHENAZOPYRIDINE 100 MG: 100 TABLET ORAL at 08:41

## 2023-01-07 RX ADMIN — PREDNISONE 20 MG: 20 TABLET ORAL at 09:20

## 2023-01-07 RX ADMIN — Medication 125 MG: at 08:41

## 2023-01-07 NOTE — PLAN OF CARE
Problem: Potential for Falls  Goal: Patient will remain free of falls  Description: INTERVENTIONS:  - Educate patient/family on patient safety including physical limitations  - Instruct patient to call for assistance with activity   - Consult OT/PT to assist with strengthening/mobility   - Keep Call bell within reach  - Keep bed low and locked with side rails adjusted as appropriate  - Keep care items and personal belongings within reach  - Initiate and maintain comfort rounds  - Make Fall Risk Sign visible to staff  - Offer Toileting every 2 Hours, in advance of need    Problem: PAIN - ADULT  Goal: Verbalizes/displays adequate comfort level or baseline comfort level  Description: Interventions:  - Encourage patient to monitor pain and request assistance  - Assess pain using appropriate pain scale  - Administer analgesics based on type and severity of pain and evaluate response  - Implement non-pharmacological measures as appropriate and evaluate response  - Consider cultural and social influences on pain and pain management  - Notify physician/advanced practitioner if interventions unsuccessful or patient reports new pain  Outcome: Progressing   - Apply yellow socks and bracelet for high fall risk patients  - Consider moving patient to room near nurses station  Outcome: Progressing

## 2023-01-07 NOTE — ASSESSMENT & PLAN NOTE
· Recent C  difficile colitis, treated with oral vancomycin, last dose was the day prior  · Patient denies any further episodes of diarrhea, stool is formed  · Continue vancomycin twice daily for prophylaxis while on antibiotics for UTI

## 2023-01-07 NOTE — ASSESSMENT & PLAN NOTE
· Patient with encrusted bilateral ureteral stents and ureteric calculi, previously followed in [place], presented with painful urination  · Patient did not have any sepsis criteria on admission  · She received 2 days of IV ceftriaxone but urine culture was actually negative    She will receive 1 more day of oral antibiotics tomorrow nonetheless for completeness in the setting of her complicated urologic history  · Recommend continued supportive care with Pyridium and close follow-up with urology

## 2023-01-07 NOTE — DISCHARGE SUMMARY
[facility]  Discharge- Arcadio Becerra 1965, 56 y o  female MRN: [id number]  Unit/Bed#: W -01 Encounter: [id number]  Primary Care Provider: No primary care provider on file  Date and time admitted to hospital: 1/6/2023  2:31 AM    * Acute cystitis with hematuria  Assessment & Plan  · Patient with encrusted bilateral ureteral stents and ureteric calculi, previously followed in [place], presented with painful urination  · Patient did not have any sepsis criteria on admission  · She received 2 days of IV ceftriaxone but urine culture was actually negative  She will receive 1 more day of oral antibiotics tomorrow nonetheless for completeness in the setting of her complicated urologic history  · Recommend continued supportive care with Pyridium and close follow-up with urology      Bilateral hydronephrosis  Assessment & Plan  · CT "Progressive mild left hydronephrosis and proximal hydroureter  No perinephric collection  Few proximal left ureteral calcifications, 3 mm and smaller have increased in number  At least a few of these calcifications are ureteral calculi given slightly intervally decreased stone burden in the left renal lower pole  Stable punctate calculus or encrustation adjacent to the distal stent  Trace new bilateral renal pyelitis and proximal left ureteritis may be sequela of progressive obstruction  Advise correlation with UA to exclude superimposed UTI  · Urology has already seen the patient in consultation and they are recommending outpatient follow-up as they do not intend to do any surgical procedures at this time       Hypokalemia  Assessment & Plan  · Supplement potassium    Sarcoidosis  Assessment & Plan  · Currently asymptomatic, continue on prednisone      C  difficile colitis  Assessment & Plan  · Recent C  difficile colitis, treated with oral vancomycin, last dose was the day prior  · Patient denies any further episodes of diarrhea, stool is formed  · Continue vancomycin twice daily for prophylaxis while on antibiotics for UTI  Adrenal nodule (HCC)  Assessment & Plan  1 cm right adrenal nodule, outpatient follow-up, repeat imaging studies in 12 months  Discussed these findings with the patient  Medical Problems     Resolved Problems  Date Reviewed: 1/7/2023   None       Discharging Physician / Practitioner: Su Aguirre PA-C  PCP: No primary care provider on file  Admission Date:   Admission Orders (From admission, onward)     Ordered        01/06/23 0801  INPATIENT ADMISSION  Once                      Discharge Date: 01/07/23    Consultations During Hospital Stay:  · Urology    Procedures Performed:   · None    Significant Findings / Test Results:   · CAT scan as above    Incidental Findings:   · Adrenal nodule      Test Results Pending at Discharge (will require follow up): · None     Outpatient Tests Requested:  · Per urology    Complications: None    Reason for Admission: Painful urination    Hospital Course:   Azul Reyes is a 56 y o  female patient who originally presented to the hospital on 1/6/2023 due to painful urination  Patient has a very complicated urologic history including stents placed in [place] which apparently were never removed  Patient was initially placed on empiric ceftriaxone for concern of urinary tract infection and given supportive measures including Pyridium  However her urine culture actually came back negative  Nonetheless she will receive a 3-day course of antibiotics for completeness and was discharged with Pyridium  Given her recent history of C  difficile colitis she was also given a few more days of empiric vancomycin  She is improved at this time and will follow-up with our urology team locally she just recently moved to this area    The patient, initially admitted to the hospital as inpatient, was discharged earlier than expected given the following: Improvement in symptoms      Please see above list of diagnoses and related plan for additional information  Condition at Discharge: stable    Discharge Day Visit / Exam:   Subjective: Patient doing better with complaints of pain and has not had any fever, chills, difficulty ambulating or taking in oral  Vitals: Blood Pressure: 146/76 (01/07/23 0721)  Pulse: 101 (01/06/23 2145)  Temperature: 97 8 °F (36 6 °C) (01/07/23 0721)  Temp Source: Oral (01/06/23 1437)  Respirations: 18 (01/06/23 2215)  Height: 5' 1" (154 9 cm) (01/06/23 0536)  Weight - Scale: 63 2 kg (139 lb 5 3 oz) (01/06/23 1437)  SpO2: 99 % (01/07/23 0800)  Exam:   Physical Exam  Vitals reviewed  Constitutional:       General: She is not in acute distress  Appearance: She is not ill-appearing, toxic-appearing or diaphoretic  Eyes:      General: No scleral icterus  Right eye: No discharge  Left eye: No discharge  Conjunctiva/sclera: Conjunctivae normal    Cardiovascular:      Rate and Rhythm: Normal rate and regular rhythm  Heart sounds: No murmur heard  Pulmonary:      Effort: No respiratory distress  Breath sounds: No stridor  No wheezing, rhonchi or rales  Abdominal:      General: Bowel sounds are normal  There is no distension  Palpations: Abdomen is soft  Tenderness: There is no abdominal tenderness  There is no guarding  Musculoskeletal:      Right lower leg: No edema  Left lower leg: No edema  Skin:     General: Skin is warm and dry  Coloration: Skin is not jaundiced or pale  Findings: No bruising, erythema, lesion or rash  Neurological:      General: No focal deficit present  Mental Status: She is alert  Mental status is at baseline  Psychiatric:         Mood and Affect: Mood normal          Thought Content: Thought content normal           Discussion with Family: Patient declined call to        Discharge instructions/Information to patient and family:   See after visit summary for information provided to patient and family  Provisions for Follow-Up Care:  See after visit summary for information related to follow-up care and any pertinent home health orders  Disposition:   Home    Planned Readmission: none     Discharge Statement:  I spent 35 minutes discharging the patient  This time was spent on the day of discharge  I had direct contact with the patient on the day of discharge  Greater than 50% of the total time was spent examining patient, answering all patient questions, arranging and discussing plan of care with patient as well as directly providing post-discharge instructions  Additional time then spent on discharge activities  Case was discussed with urology, nursing, and case management    Discharge Medications:  See after visit summary for reconciled discharge medications provided to patient and/or family        **Please Note: This note may have been constructed using a voice recognition system**

## 2023-01-07 NOTE — CASE MANAGEMENT
Case Management Progress Note    Patient name Katya Mejia  Location W /W -42 MRN [id number]  : 1965 Date 2023       LOS (days): 1  Geometric Mean LOS (GMLOS) (days): 3 40  Days to GMLOS:2 1        OBJECTIVE:        Current admission status: Inpatient  [facility]   [address]  [address] [address]  Phone: [phone] [facility]ax: [facility] ([place] [address] - [address]  [address] [address]  Phone: [phone] Fax: [phone]    Primary Care Provider: No primary care provider on file  Primary Insurance:   Secondary Insurance:     PROGRESS NOTE:  CM notified by RN that patient can not afford medications at this time, as she just moved from [place] and just applied for [employer]  CM met with patient at bedside to confirm this  Meds [facility] to [facility]- CM received med prices and indigent med form was filled out and sent to pharmacy  Meds delivered to patient  Copy of med form left for CM manager as well  No further CM needs

## 2023-01-07 NOTE — ASSESSMENT & PLAN NOTE
· CT "Progressive mild left hydronephrosis and proximal hydroureter  No perinephric collection  Few proximal left ureteral calcifications, 3 mm and smaller have increased in number  At least a few of these calcifications are ureteral calculi given slightly intervally decreased stone burden in the left renal lower pole  Stable punctate calculus or encrustation adjacent to the distal stent  Trace new bilateral renal pyelitis and proximal left ureteritis may be sequela of progressive obstruction  Advise correlation with UA to exclude superimposed UTI    · Urology has already seen the patient in consultation and they are recommending outpatient follow-up as they do not intend to do any surgical procedures at this time

## 2023-01-07 NOTE — PLAN OF CARE
Problem: Potential for Falls  Goal: Patient will remain free of falls  Description: INTERVENTIONS:  - Educate patient/family on patient safety including physical limitations  - Instruct patient to call for assistance with activity   - Consult OT/PT to assist with strengthening/mobility   - Keep Call bell within reach  - Keep bed low and locked with side rails adjusted as appropriate  - Keep care items and personal belongings within reach  - Initiate and maintain comfort rounds  - Make Fall Risk Sign visible to staff  - Offer Toileting every 2 Hours, in advance of need  - Initiate/Maintain alarm  - Obtain necessary fall risk management equipment: yes    Problem: INFECTION - ADULT  Goal: Absence or prevention of progression during hospitalization  Description: INTERVENTIONS:  - Assess and monitor for signs and symptoms of infection  - Monitor lab/diagnostic results  - Monitor all insertion sites, i e  indwelling lines, tubes, and drains  - Monitor endotracheal if appropriate and nasal secretions for changes in amount and color  - [place] appropriate cooling/warming therapies per order  - Administer medications as ordered  - Instruct and encourage patient and family to use good hand hygiene technique  - Identify and instruct in appropriate isolation precautions for identified infection/condition  Outcome: Progressing     - Apply yellow socks and bracelet for high fall risk patients  - Consider moving patient to room near nurses station  Outcome: Progressing      Problem: INFECTION - ADULT  Goal: Absence of fever/infection during neutropenic period  Description: INTERVENTIONS:  - Monitor WBC    Outcome: Progressing       Problem: Knowledge Deficit  Goal: Patient/family/caregiver demonstrates understanding of disease process, treatment plan, medications, and discharge instructions  Description: Complete learning assessment and assess knowledge base    Interventions:  - Provide teaching at level of understanding  - Provide teaching via preferred learning methods  Outcome: Progressing

## 2023-01-07 NOTE — DISCHARGE INSTR - AVS FIRST PAGE
Dear Brayden Fowler,     It was our pleasure to care for you here at [facility]  It is our hope that we were always able to exceed the expected standards for your care during your stay  You were hospitalized due to painful urination  You were cared for on the [address] [place] [address] fourth floor by Obie Prader, PA-C under the service of Tyler Alcocer MD with the [facility] who covers for your primary care physician (PCP), No primary care provider on file  , while you were hospitalized  If you have any questions or concerns related to this hospitalization, you may contact us at [phone]  For follow up as well as any medication refills, we recommend that you follow up with your primary care physician  A registered nurse will reach out to you by phone within a few days after your discharge to answer any additional questions that you may have after going home  However, at this time we provide for you here, the most important instructions / recommendations at discharge:     Notable Medication Adjustments -   Take 1 day of antibiotics tomorrow although urine sample was negative for bacteria  Take Pyridium for burning with urination  Testing Required after Discharge -   Per urology, follow-up to have additional testing and stent removal  Important follow up information -   Urology  Family doctor  Other Instructions -   You should have a repeat picture of your small adrenal nodule within 1 year  Please review this entire after visit summary as additional general instructions including medication list, appointments, activity, diet, any pertinent wound care, and other additional recommendations from your care team that may be provided for you        Sincerely,     Obie Prader, PA-C

## 2023-01-07 NOTE — UTILIZATION REVIEW
Initial Clinical Review    Admission: Date/Time/Statement:   Admission Orders (From admission, onward)     Ordered        01/06/23 0801  INPATIENT ADMISSION  Once                      Orders Placed This Encounter   Procedures   • INPATIENT ADMISSION     Standing Status:   Standing     Number of Occurrences:   1     Order Specific Question:   Level of Care     Answer:   Med Surg [16]     Order Specific Question:   Estimated length of stay     Answer:   More than 2 Midnights     Order Specific Question:   Certification     Answer:   I certify that inpatient services are medically necessary for this patient for a duration of greater than two midnights  See H&P and MD Progress Notes for additional information about the patient's course of treatment  ED Arrival Information     Expected   -    Arrival   1/6/2023 02:02    Acuity   Urgent            Means of arrival   Walk-In    Escorted by   Family Member    Service   Hospitalist    Admission type   Emergency            Arrival complaint   [address]            Chief Complaint   Patient presents with   • Vaginal Pain     Pt reports b/l ureteral stents and has b/l kidney stones  Reports extreme vaginal pain from stones passing through, denies bladder pain  Hx lithotripsy   Last oxycodone take 10:20pm with no pain relief  Initial Presentation: 56 y o  female from home to ED admitted inpatient due to acute cystitis/Bilateral hydronephrosis s/p bilateral ureteral stent/recent C diff  PMH of colon cancer s/p colostomy placement, renal calculi, bilateral urethral stents, and sarcoidosis  Presented due to vaginal pain starting about 2 to 3 days prior to arrival   Recent diag  Of C Diff and finished oral vancomycin yesterday  On exam:  Ostomy to LLQ  Stoma beefy and red  Defers vaginal exam, patinet feels pain related to kidneys/ureter  K 3 1   UA large leukocytes, occult blood  Wbc 6 39  Ct abdomen showed progressive right and left  Hydronephrosis  Trace new bilateral renal pyelitis and proximal left ureteritis may be sequela of progressive obstruction  In the ED given bolus of IVF   KCL  Plan is consult urology  Start ceftriaxone and follow cultures  Start oral vanco   Continue IVF    Per urology 1/6/23:  Patient with UTI/Bilateral ureteral stents with hydronephrosis  Recommend establishment of OP follow up:  appearance of retained ureteral stents (placed in [place] by report) with encrustation and hydronephrosis  Doubt severe infection  Will need surgical intervention of retained hardware- bilateral staged ureteroscopy  Continue antibiotics  No current  intervention  ED Triage Vitals   Temperature Pulse Respirations Blood Pressure SpO2   01/06/23 0210 01/06/23 0210 01/06/23 0210 01/06/23 0210 01/06/23 0210   98 3 °F (36 8 °C) 92 20 (!) 194/93 99 %      Temp Source Heart Rate Source Patient Position - Orthostatic VS BP Location FiO2 (%)   01/06/23 0210 01/06/23 0210 01/06/23 1437 01/06/23 1437 --   Oral Monitor Lying Right arm       Pain Score       01/06/23 0815       No Pain          Wt Readings from Last 1 Encounters:   01/06/23 63 2 kg (139 lb 5 3 oz)     Additional Vital Signs:   01/07/23 07:21:37 97 8 °F (36 6 °C) -- -- 146/76 99 -- -- --   01/06/23 23:49:28 -- -- -- 149/74 99 -- -- --   01/06/23 2222 -- -- -- 150/92 -- -- -- Lying   01/06/23 22:15:21 -- -- 18 168/106 Abnormal  127 -- -- --   01/06/23 21:45:51 -- 101 19 167/109 Abnormal  128 -- -- --   01/06/23 14:37:37 98 8 °F (37 1 °C) 96 16 177/97 Abnormal  124 99 % None (Room air) Lying   01/06/23 1130 -- 79 -- 159/76 109 100 % -- --   01/06/23 1100 -- 75 -- 144/74 101 99 % -- --   01/06/23 0630 -- 84 -- 156/77 109 99 % -- --   01/06/23 0536 -- 84 18 159/77 110 100 %       Pertinent Labs/Diagnostic Test Results:   CT abdomen pelvis w contrast   Final Result by Nany Garcia MD (01/06 0600)      Progressive moderate right hydronephrosis   No perinephric collection   Stable encrustation of the proximal right nephroureteral stent as described above  Progressive mild left hydronephrosis and proximal hydroureter  No perinephric collection  Few proximal left ureteral calcifications, 3 mm and smaller have increased in number  At least a few of these calcifications are ureteral calculi given slightly    intervally decreased stone burden in the left renal lower pole  Stable punctate calculus or encrustation adjacent to the distal stent  Trace new bilateral renal pyelitis and proximal left ureteritis may be sequela of progressive obstruction  Advise correlation with UA to exclude superimposed UTI  Left nephrolithiasis, 4 mm and smaller  Prior colitis proximal to the colostomy has resolved  Minimal gallbladder wall thickening without calcified gallstones  This may be sequela of chronic hepatocellular disease, partially contracted gallbladder or nonspecific chronic inflammation  This could be followed up with nonemergent gallbladder    ultrasound  Stable 1 cm right adrenal nodule  Although its imaging features are indeterminate, it does not have suspicious imaging features (heterogeneity, necrosis, irregular margins), therefore this is likely benign, and can be followed by non-contrast abdomen CT    or MRI in 12 months  If patient has history of adrenal hyperfunction, consider biochemical evaluation  Adrenal recommendation based on institutional consensus and Journal of [employer] of Radiology 2017;14:4763-9343      This study demonstrates a significant  finding and was documented as such in [facility] for liaison and referring practitioner notification               Workstation performed: [id number]             Results from last 7 days   Lab Units 01/07/23  0513 01/06/23  0340   WBC Thousand/uL 6 87 6 39   HEMOGLOBIN g/dL 11 3* 10 3*   HEMATOCRIT % 33 5* 30 4*   PLATELETS Thousands/uL 242 221   NEUTROS ABS Thousands/µL 5 00 5 08     Results from last 7 days Lab Units 01/07/23  0513 01/06/23  0340   SODIUM mmol/L 142 142   POTASSIUM mmol/L 3 2* 3 1*   CHLORIDE mmol/L 108 107   CO2 mmol/L 27 28   ANION GAP mmol/L 7 7   BUN mg/dL 15 22   CREATININE mg/dL 0 76 0 74   EGFR ml/min/1 73sq m 88 91   CALCIUM mg/dL 8 9 9 7     Results from last 7 days   Lab Units 01/06/23  0340   AST U/L 30   ALT U/L 36   ALK PHOS U/L 178*   TOTAL PROTEIN g/dL 6 3*   ALBUMIN g/dL 3 7   TOTAL BILIRUBIN mg/dL 0 85     Results from last 7 days   Lab Units 01/07/23  0513 01/06/23  0340   GLUCOSE RANDOM mg/dL 89 92     Results from last 7 days   Lab Units 01/06/23  0340   LACTIC ACID mmol/L 0 9     Results from last 7 days   Lab Units 01/06/23  0340   LIPASE u/L 74     Results from last 7 days   Lab Units 01/06/23  0345   CLARITY UA  Clear   COLOR UA  Colorless   SPEC GRAV UA  1 011   PH UA  7 0   GLUCOSE UA mg/dl 100 (1/10%)*   KETONES UA mg/dl Negative   BLOOD UA  Large*   PROTEIN UA mg/dl 100 (2+)*   NITRITE UA  Negative   BILIRUBIN UA  Negative   UROBILINOGEN UA (BE) mg/dl <2 0   LEUKOCYTES UA  Large*   WBC UA /hpf Innumerable*   RBC UA /hpf Innumerable*   BACTERIA UA /hpf Occasional   EPITHELIAL CELLS WET PREP /hpf Occasional   MUCUS THREADS  Occasional*     Results from last 7 days   Lab Units 01/06/23  0345   URINE CULTURE  No Growth <1000 cfu/mL       ED Treatment:   Medication Administration from 01/06/2023 0201 to 01/06/2023 1435       Date/Time Order Dose Route Action Comments     01/06/2023 0343 EST sodium chloride 0 9 % bolus 1,000 mL 1,000 mL Intravenous New Bag --     01/06/2023 1151 EST potassium chloride (K-DUR,KLOR-CON) CR tablet 20 mEq 20 mEq Oral Given --     01/06/2023 1242 EST lactated ringers infusion 75 mL/hr Intravenous New Bag --     01/06/2023 1151 EST enoxaparin (LOVENOX) subcutaneous injection 40 mg 40 mg Subcutaneous Given --     01/06/2023 1151 EST ceftriaxone (ROCEPHIN) 1 g/50 mL in dextrose IVPB 1,000 mg Intravenous New Bag --     01/06/2023 1151 EST vancomycin (VANCOCIN) oral solution 125 mg 125 mg Oral Given --        Past Medical History:   Diagnosis Date   • Colon cancer ([facility] [address] )    • Hemochromatosis, unspecified    • Kidney calculi    • Sarcoidosis      Present on Admission:  • [facility]  • C  difficile colitis  • Sarcoidosis  • Hypokalemia  • Bilateral hydronephrosis  • Acute cystitis with hematuria  • Adrenal nodule (HCC)      Admitting Diagnosis: UTI (urinary tract infection) [N39 0]  Flank pain [R10 9]  Hydronephrosis, bilateral [N13 30]  Age/Sex: 56 y o  female  Admission Orders: 1/6/23 0801 inpatient   Scheduled Medications:  cefTRIAXone, 1,000 mg, Intravenous, Q24H  cyanocobalamin, 1,000 mcg, Oral, Daily  enoxaparin, 40 mg, Subcutaneous, Daily  phenazopyridine, 100 mg, Oral, TID With Meals  predniSONE, 10 mg, Oral, HS  predniSONE, 20 mg, Oral, Daily  tamsulosin, 0 4 mg, Oral, HS  vancomycin, 125 mg, Oral, Q12H [address]    morphine injection 2 mg  Dose: 2 mg  Freq: Once Route: IV  Start: 01/06/23 2145 End: 01/06/23 2152    potassium chloride (K-DUR,KLOR-CON) CR tablet 20 mEq  Dose: 20 mEq  Freq: 2 times daily Route: PO  Start: 01/06/23 1115 End: 01/07/23 1120    potassium chloride (K-DUR,KLOR-CON) CR tablet 40 mEq  Dose: 40 mEq  Freq: Once Route: PO  Start: 01/07/23 1130 End: 01/07/23 1146    Continuous IV Infusions:  lactated ringers, 75 mL/hr, Intravenous, Continuous      PRN Meds: not used   acetaminophen, 650 mg, Oral, Q6H PRN      IP CONSULT TO UROLOGY    Network Utilization Review Department  ATTENTION: Please call with any questions or concerns to [phone] and carefully listen to the prompts so that you are directed to the right person  All voicemails are confidential   Tish Skipper all requests for admission clinical reviews, approved or denied determinations and any other requests to dedicated fax number below belonging to the campus where the patient is receiving treatment   List of dedicated fax numbers for the Facilities:  Mayuri Faye NAME UR FAX NUMBER   ADMISSION DENIALS (Administrative/Medical Necessity) [phone]   PARENT CHILD HEALTH (Maternity/NICU/Pediatrics) Cayla Linares [address] Kristen Gonzales  [phone]   [address] [facility] [address] [place] [place] [address] [facility] [address] [facility] [address] [facility] [phone]

## 2023-01-17 ENCOUNTER — ANESTHESIA EVENT (OUTPATIENT)
Dept: PERIOP | Facility: HOSPITAL | Age: 58
End: 2023-01-17

## 2023-01-17 ENCOUNTER — PREP FOR PROCEDURE (OUTPATIENT)
Dept: UROLOGY | Facility: AMBULATORY SURGERY CENTER | Age: 58
End: 2023-01-17

## 2023-01-17 ENCOUNTER — TELEPHONE (OUTPATIENT)
Dept: UROLOGY | Facility: AMBULATORY SURGERY CENTER | Age: 58
End: 2023-01-17

## 2023-01-17 DIAGNOSIS — N20.1 BILATERAL URETERAL CALCULI: Primary | ICD-10-CM

## 2023-01-17 DIAGNOSIS — R39.89 SUSPECTED UTI: ICD-10-CM

## 2023-01-17 NOTE — TELEPHONE ENCOUNTER
I called pt to discuss her follow up Ximena Sommers with Dr Codey Skinner at the [facility] on 1/23/2023  I was able to speak with pt and her friend Smithaab Orozco and they both confirmed that 1/23/23 will work for them  Pt will have her urine culture done this afternoon at a [facility]'s lab  Per pt 's request I am e-mailing her a copy of her surgical packet and instructed her to call our office with any questions or concerns regarding this procedure  I then asked pt if she had changed her medical insurance and Smitha Orozco stated that they have applied for Medical Assistance and they are waiting to hear back  Smitha Orozco is aware that they will need to contact  just in case medical assistance does not come through in time

## 2023-01-18 ENCOUNTER — APPOINTMENT (OUTPATIENT)
Dept: LAB | Facility: CLINIC | Age: 58
End: 2023-01-18

## 2023-01-18 DIAGNOSIS — N20.1 BILATERAL URETERAL CALCULI: ICD-10-CM

## 2023-01-18 DIAGNOSIS — R39.89 SUSPECTED UTI: ICD-10-CM

## 2023-01-19 LAB — BACTERIA UR CULT: NORMAL

## 2023-01-19 NOTE — PRE-PROCEDURE INSTRUCTIONS
Pre-Surgery Instructions:   Medication Instructions   • acetaminophen (TYLENOL) 325 mg tablet Uses PRN- OK to take day of surgery   • CAPECITABINE PO Take day of surgery  • cyanocobalamin (VITAMIN B-12) 1000 MCG tablet instructed to hold   • phenazopyridine (PYRIDIUM) 100 mg tablet will be done with   • POTASSIUM PO Hold day of surgery  • predniSONE 10 mg tablet Take night before surgery   • predniSONE 20 mg tablet Take day of surgery  • tamsulosin (FLOMAX) 0 4 mg Take night before surgery    [facility] preop instructions reviewed with pt  Pt will get antibacterial soap

## 2023-01-23 ENCOUNTER — HOSPITAL ENCOUNTER (OUTPATIENT)
Facility: HOSPITAL | Age: 58
Setting detail: OUTPATIENT SURGERY
Discharge: HOME | End: 2023-01-23
Attending: UROLOGY | Admitting: UROLOGY

## 2023-01-23 ENCOUNTER — APPOINTMENT (OUTPATIENT)
Dept: RADIOLOGY | Facility: HOSPITAL | Age: 58
End: 2023-01-23

## 2023-01-23 ENCOUNTER — ANESTHESIA (OUTPATIENT)
Dept: PERIOP | Facility: HOSPITAL | Age: 58
End: 2023-01-23

## 2023-01-23 VITALS
TEMPERATURE: 97.8 F | OXYGEN SATURATION: 98 % | DIASTOLIC BLOOD PRESSURE: 78 MMHG | HEIGHT: 62 IN | RESPIRATION RATE: 20 BRPM | SYSTOLIC BLOOD PRESSURE: 162 MMHG | HEART RATE: 86 BPM | WEIGHT: 139 LBS | BODY MASS INDEX: 25.58 KG/M2

## 2023-01-23 DIAGNOSIS — N13.30 BILATERAL HYDRONEPHROSIS: Primary | ICD-10-CM

## 2023-01-23 DEVICE — INLAY OPTIMA URETERAL STENT W/O GUIDEWIRE
Type: IMPLANTABLE DEVICE | Status: FUNCTIONAL
Brand: BARD® INLAY OPTIMA® URETERAL STENT

## 2023-01-23 RX ORDER — OXYBUTYNIN CHLORIDE 5 MG/1
5 TABLET ORAL EVERY 6 HOURS PRN
Qty: 30 TABLET | Refills: 0 | Status: SHIPPED | OUTPATIENT
Start: 2023-01-23 | End: 2023-01-27

## 2023-01-23 RX ORDER — SODIUM CHLORIDE, SODIUM LACTATE, POTASSIUM CHLORIDE, CALCIUM CHLORIDE 600; 310; 30; 20 MG/100ML; MG/100ML; MG/100ML; MG/100ML
INJECTION, SOLUTION INTRAVENOUS CONTINUOUS PRN
Status: DISCONTINUED | OUTPATIENT
Start: 2023-01-23 | End: 2023-01-23

## 2023-01-23 RX ORDER — ONDANSETRON 2 MG/ML
INJECTION INTRAMUSCULAR; INTRAVENOUS AS NEEDED
Status: DISCONTINUED | OUTPATIENT
Start: 2023-01-23 | End: 2023-01-23

## 2023-01-23 RX ORDER — METOPROLOL TARTRATE 5 MG/5ML
INJECTION INTRAVENOUS AS NEEDED
Status: DISCONTINUED | OUTPATIENT
Start: 2023-01-23 | End: 2023-01-23

## 2023-01-23 RX ORDER — DEXAMETHASONE SODIUM PHOSPHATE 10 MG/ML
INJECTION, SOLUTION INTRAMUSCULAR; INTRAVENOUS AS NEEDED
Status: DISCONTINUED | OUTPATIENT
Start: 2023-01-23 | End: 2023-01-23

## 2023-01-23 RX ORDER — PROMETHAZINE HYDROCHLORIDE 25 MG/ML
25 INJECTION, SOLUTION INTRAMUSCULAR; INTRAVENOUS ONCE AS NEEDED
Status: DISCONTINUED | OUTPATIENT
Start: 2023-01-23 | End: 2023-01-23 | Stop reason: HOSPADM

## 2023-01-23 RX ORDER — CEFAZOLIN SODIUM 2 G/50ML
2000 SOLUTION INTRAVENOUS ONCE
Status: DISCONTINUED | OUTPATIENT
Start: 2023-01-23 | End: 2023-01-23

## 2023-01-23 RX ORDER — DOCUSATE SODIUM 100 MG/1
100 CAPSULE, LIQUID FILLED ORAL 2 TIMES DAILY
Qty: 30 CAPSULE | Refills: 0 | Status: ON HOLD | OUTPATIENT
Start: 2023-01-23 | End: 2023-02-07

## 2023-01-23 RX ORDER — CEFAZOLIN SODIUM 1 G/50ML
1000 SOLUTION INTRAVENOUS ONCE
Status: DISCONTINUED | OUTPATIENT
Start: 2023-01-23 | End: 2023-01-23 | Stop reason: HOSPADM

## 2023-01-23 RX ORDER — SODIUM CHLORIDE 9 MG/ML
INJECTION, SOLUTION INTRAVENOUS AS NEEDED
Status: DISCONTINUED | OUTPATIENT
Start: 2023-01-23 | End: 2023-01-23 | Stop reason: HOSPADM

## 2023-01-23 RX ORDER — ROCURONIUM BROMIDE 10 MG/ML
INJECTION, SOLUTION INTRAVENOUS AS NEEDED
Status: DISCONTINUED | OUTPATIENT
Start: 2023-01-23 | End: 2023-01-23

## 2023-01-23 RX ORDER — MIDAZOLAM HYDROCHLORIDE 2 MG/2ML
INJECTION, SOLUTION INTRAMUSCULAR; INTRAVENOUS AS NEEDED
Status: DISCONTINUED | OUTPATIENT
Start: 2023-01-23 | End: 2023-01-23

## 2023-01-23 RX ORDER — LIDOCAINE HYDROCHLORIDE 10 MG/ML
INJECTION, SOLUTION EPIDURAL; INFILTRATION; INTRACAUDAL; PERINEURAL AS NEEDED
Status: DISCONTINUED | OUTPATIENT
Start: 2023-01-23 | End: 2023-01-23

## 2023-01-23 RX ORDER — METOCLOPRAMIDE HYDROCHLORIDE 5 MG/ML
10 INJECTION INTRAMUSCULAR; INTRAVENOUS ONCE AS NEEDED
Status: DISCONTINUED | OUTPATIENT
Start: 2023-01-23 | End: 2023-01-23 | Stop reason: HOSPADM

## 2023-01-23 RX ORDER — ACETAMINOPHEN 325 MG/1
650 TABLET ORAL EVERY 4 HOURS PRN
Qty: 30 TABLET | Refills: 0
Start: 2023-01-23 | End: 2023-01-27

## 2023-01-23 RX ORDER — MAGNESIUM HYDROXIDE 1200 MG/15ML
LIQUID ORAL AS NEEDED
Status: DISCONTINUED | OUTPATIENT
Start: 2023-01-23 | End: 2023-01-23 | Stop reason: HOSPADM

## 2023-01-23 RX ORDER — PROPOFOL 10 MG/ML
INJECTION, EMULSION INTRAVENOUS AS NEEDED
Status: DISCONTINUED | OUTPATIENT
Start: 2023-01-23 | End: 2023-01-23

## 2023-01-23 RX ORDER — HYDROMORPHONE HCL/PF 1 MG/ML
0.5 SYRINGE (ML) INJECTION
Status: DISCONTINUED | OUTPATIENT
Start: 2023-01-23 | End: 2023-01-23 | Stop reason: HOSPADM

## 2023-01-23 RX ORDER — PHENAZOPYRIDINE HYDROCHLORIDE 200 MG/1
200 TABLET, FILM COATED ORAL 3 TIMES DAILY PRN
Qty: 10 TABLET | Refills: 0 | Status: SHIPPED | OUTPATIENT
Start: 2023-01-23 | End: 2023-01-27

## 2023-01-23 RX ORDER — FENTANYL CITRATE 50 UG/ML
INJECTION, SOLUTION INTRAMUSCULAR; INTRAVENOUS AS NEEDED
Status: DISCONTINUED | OUTPATIENT
Start: 2023-01-23 | End: 2023-01-23

## 2023-01-23 RX ORDER — CEFAZOLIN SODIUM 2 G/50ML
SOLUTION INTRAVENOUS AS NEEDED
Status: DISCONTINUED | OUTPATIENT
Start: 2023-01-23 | End: 2023-01-23

## 2023-01-23 RX ORDER — OXYCODONE HYDROCHLORIDE 5 MG/1
5 TABLET ORAL ONCE
Status: COMPLETED | OUTPATIENT
Start: 2023-01-23 | End: 2023-01-23

## 2023-01-23 RX ORDER — OXYCODONE HYDROCHLORIDE 5 MG/1
5 TABLET ORAL EVERY 4 HOURS PRN
Qty: 5 TABLET | Refills: 0 | Status: SHIPPED | OUTPATIENT
Start: 2023-01-23 | End: 2023-01-28

## 2023-01-23 RX ORDER — CEPHALEXIN 500 MG/1
500 CAPSULE ORAL EVERY 12 HOURS SCHEDULED
Qty: 6 CAPSULE | Refills: 0 | Status: SHIPPED | OUTPATIENT
Start: 2023-01-23 | End: 2023-01-27

## 2023-01-23 RX ORDER — FENTANYL CITRATE/PF 50 MCG/ML
25 SYRINGE (ML) INJECTION
Status: DISCONTINUED | OUTPATIENT
Start: 2023-01-23 | End: 2023-01-23 | Stop reason: HOSPADM

## 2023-01-23 RX ADMIN — FENTANYL CITRATE 50 MCG: 50 INJECTION, SOLUTION INTRAMUSCULAR; INTRAVENOUS at 14:38

## 2023-01-23 RX ADMIN — MIDAZOLAM HYDROCHLORIDE 2 MG: 1 INJECTION, SOLUTION INTRAMUSCULAR; INTRAVENOUS at 14:11

## 2023-01-23 RX ADMIN — SUGAMMADEX 100 MG: 100 INJECTION, SOLUTION INTRAVENOUS at 15:35

## 2023-01-23 RX ADMIN — METOPROLOL TARTRATE 2.5 MG: 5 INJECTION, SOLUTION INTRAVENOUS at 14:54

## 2023-01-23 RX ADMIN — FENTANYL CITRATE 50 MCG: 50 INJECTION, SOLUTION INTRAMUSCULAR; INTRAVENOUS at 14:13

## 2023-01-23 RX ADMIN — LIDOCAINE HYDROCHLORIDE 50 MG: 10 INJECTION, SOLUTION EPIDURAL; INFILTRATION; INTRACAUDAL at 14:11

## 2023-01-23 RX ADMIN — ROCURONIUM BROMIDE 30 MG: 10 SOLUTION INTRAVENOUS at 14:11

## 2023-01-23 RX ADMIN — PROPOFOL 180 MG: 10 INJECTION, EMULSION INTRAVENOUS at 14:11

## 2023-01-23 RX ADMIN — DEXAMETHASONE SODIUM PHOSPHATE 10 MG: 10 INJECTION, SOLUTION INTRAMUSCULAR; INTRAVENOUS at 14:11

## 2023-01-23 RX ADMIN — METOPROLOL TARTRATE 2.5 MG: 5 INJECTION, SOLUTION INTRAVENOUS at 14:59

## 2023-01-23 RX ADMIN — PROPOFOL 60 MG: 10 INJECTION, EMULSION INTRAVENOUS at 15:14

## 2023-01-23 RX ADMIN — ONDANSETRON 4 MG: 2 INJECTION INTRAMUSCULAR; INTRAVENOUS at 15:26

## 2023-01-23 RX ADMIN — SODIUM CHLORIDE, SODIUM LACTATE, POTASSIUM CHLORIDE, AND CALCIUM CHLORIDE: .6; .31; .03; .02 INJECTION, SOLUTION INTRAVENOUS at 14:11

## 2023-01-23 RX ADMIN — CEFAZOLIN SODIUM 1000 MG: 2 SOLUTION INTRAVENOUS at 14:13

## 2023-01-23 RX ADMIN — OXYCODONE HYDROCHLORIDE 5 MG: 5 TABLET ORAL at 16:44

## 2023-01-23 NOTE — ANESTHESIA PREPROCEDURE EVALUATION
Procedure:  CYSTOSCOPY URETEROSCOPY WITH LITHOTRIPSY HOLMIUM LASER, RETROGRADE PYELOGRAM AND INSERTION STENT URETERAL (Bilateral: Bladder)    Relevant Problems   CARDIO   (+) Primary hypertension      GI/HEPATIC   (+) Malignant neoplasm of sigmoid colon (HCC)      /RENAL   (+) Bilateral hydronephrosis        Physical Exam    Airway    Mallampati score: III  TM Distance: >3 FB  Neck ROM: full     Dental       Cardiovascular  Cardiovascular exam normal    Pulmonary  Pulmonary exam normal     Other Findings      Malignant neoplasm of sigmoid colon (HCC)   C  difficile colitis   Other hemochromatosis   Primary hypertension   Sarcoidosis   Impaired fasting glucose   Hypokalemia   Transaminitis   Bilateral hydronephrosis   Acute cystitis with hematuria   Adrenal nodule (HCC)       Anesthesia Plan  ASA Score- 3     Anesthesia Type- general with ASA Monitors  Additional Monitors:   Airway Plan: ETT  Plan Factors-    Chart reviewed  EKG reviewed  Existing labs reviewed  Patient summary reviewed  Patient is not a current smoker  Induction- intravenous  Postoperative Plan- Plan for postoperative opioid use  Informed Consent-   I personally reviewed this patient with the CRNA  Discussed and agreed on the Anesthesia Plan with the CRNA           * Acute cystitis with hematuria  Assessment & Plan  • Patient with encrusted bilateral ureteral stents and ureteric calculi, previously followed in [place], presented with painful urination  • Patient did not have any sepsis criteria on admission  • She received 2 days of IV ceftriaxone but urine culture was actually negative    She will receive 1 more day of oral antibiotics tomorrow nonetheless for completeness in the setting of her complicated urologic history  • Recommend continued supportive care with Pyridium and close follow-up with urology        Bilateral hydronephrosis  Assessment & Plan  • CT "Progressive mild left hydronephrosis and proximal hydroureter  No perinephric collection  Few proximal left ureteral calcifications, 3 mm and smaller have increased in number  At least a few of these calcifications are ureteral calculi given slightly intervally decreased stone burden in the left renal lower pole  Stable punctate calculus or encrustation adjacent to the distal stent  Trace new bilateral renal pyelitis and proximal left ureteritis may be sequela of progressive obstruction  Advise correlation with UA to exclude superimposed UTI  • Urology has already seen the patient in consultation and they are recommending outpatient follow-up as they do not intend to do any surgical procedures at this time        Hypokalemia  Assessment & Plan  • Supplement potassium     Sarcoidosis  Assessment & Plan  • Currently asymptomatic, continue on prednisone      C  difficile colitis  Assessment & Plan  • Recent C  difficile colitis, treated with oral vancomycin, last dose was the day prior  • Patient denies any further episodes of diarrhea, stool is formed    • Continue vancomycin twice daily for prophylaxis while on antibiotics for UTI      Adrenal nodule (HCC)  Assessment & Plan  1 cm right adrenal nodule, outpatient follow-up, repeat imaging studies in 12 months  Discussed these findings with the patient

## 2023-01-23 NOTE — H&P
UROLOGY HISTORY AND PHYSICAL     Patient Identifiers: Martha Key (MRN [id number])      Date of Service: 1/23/2023        ASSESSMENT:     56 y o  old female with  complex upper nephrolithiasis, indwelling ureteral stents which have been in place for least some months  I personally met with the patient while he was in the hospital and met with she and her ex   In addition to her complex bilateral nephrolithiasis patient is recently diagnosed colorectal cancer she has received neoadjuvant chemotherapy and is seeking to establish care for surgical management here in the [facility]  As such I discussed the pressing of managing the stents, managing the stone but also mitigating her risk of serious UTIs which may further delay any oncologic treatments  As such I suggested stage intervention and the patient is amenable  Discussed options for management of the patient's ureteral stone  We discussed surgical options including ureteroscopy and shock wave lithotripsy  In addition, we discussed conservative management with medical expulsive therapy  The patient has elected to undergo ureteroscopy  I discussed with the patients risks and benefits and alternatives to ureteroscopy with laser lithotripsy  The risks include bleeding, infection, injury to the urethra, bladder, ureter or kidney, risk of a staged procedure, risk of stricture, risk of residual fragments, risk of loss of kidney, risks of anesthesia including DVT, PE, MI and death  The patient understands that a ureteral stent will likely be left in place at the time of the procedure  We reviewed the expected postoperative care  The patient understands these risks and has provided informed consent     PLAN:     We will proceed with bilateral staged ureteroscopy  Goal today is to exchange both stents if possible and to address her right-sided stone burden        History of Present Illness:     Martha Key is a 56 y o  old with a history of complex nephrolithiasis, lost to her primary urologic team in [place], seeking to establish care  I evaluated the patient as an inpatient consultation recently  Past Medical, Past Surgical History:     Past Medical History:   Diagnosis Date   • Colon cancer ([facility] [address] )    • Fall    • Headache    • Hemochromatosis, unspecified    • History of transfusion     2022 - no adverse reaction   • Kidney calculi    • Kidney stone    • Liver disease     hemangioma   • Muscle weakness    • Sarcoidosis    • Seizures ([facility] [address] )     2022   :    Past Surgical History:   Procedure Laterality Date   • ABSCESS DRAINAGE      left breast   • CHEST TUBE INSERTION     • COLON SURGERY      colostomy   • COLONOSCOPY     • LUNG BIOPSY     • TONSILLECTOMY     • URINARY SURGERY Bilateral     bilateral stents   :    Medications, Allergies:     Current Facility-Administered Medications:   •  ceFAZolin (ANCEF) IVPB (premix in dextrose) 1,000 mg 50 mL, 1,000 mg, Intravenous, Once, Melanie Colorado MD    Allergies:  No Known Allergies:    Social and Family History:   Social History:   Social History     Tobacco Use   • Smoking status: Never     Passive exposure: Past   • Smokeless tobacco: Never   Vaping Use   • Vaping Use: Never used   Substance Use Topics   • Alcohol use: Never   • Drug use: Never     Social History     Tobacco Use   Smoking Status Never   • Passive exposure: Past   Smokeless Tobacco Never       Family History:  History reviewed  No pertinent family history :     Review of Systems:     General: Fever, chills, or night sweats: negative  Cardiac: Negative for chest pain  Pulmonary: Negative for shortness of breath  Gastrointestinal: Abdominal pain negative  Nausea, vomiting, or diarrhea negative  Genitourinary: See HPI above  Patient does nothave hematuria  All other systems queried were negative  Physical Exam:   General: Patient is pleasant and in NAD   Awake and alert  /91   Pulse (!) 115 Temp 97 5 °F (36 4 °C)   Resp 20   Ht 5' 2" (1 575 m)   Wt 63 kg (139 lb)   SpO2 98%   BMI 25 42 kg/m²   HEENT:  Normocephalic atraumatic  Cardiac:  Regular rate and rhythm, Peripheral edema: negative  Pulmonary: Non-labored breathing, CTAB  Abdomen: Soft, non-tender, non-distended  No surgical scars  No masses, tenderness, hernias noted  Genitourinary: negative CVA tenderness, neg suprapubic tenderness  Extremities: normal movement in all 4       Labs:     Lab Results   Component Value Date    HGB 11 3 (L) 01/07/2023    HCT 33 5 (L) 01/07/2023    WBC 6 87 01/07/2023     01/07/2023   ]    Lab Results   Component Value Date    K 3 2 (L) 01/07/2023     01/07/2023    CO2 27 01/07/2023    BUN 15 01/07/2023    CREATININE 0 76 01/07/2023    CALCIUM 8 9 01/07/2023   ]    Imaging:   I personally reviewed the images and report of the following studies, and reviewed them with the patient:        Thank you for allowing me to participate in this patients’ care  Please do not hesitate to call with any additional questions    Janice Barraza MD

## 2023-01-23 NOTE — ANESTHESIA POSTPROCEDURE EVALUATION
Post-Op Assessment Note    CV Status:  Stable  Pain Score: 0    Pain management: adequate     Mental Status:  Alert and awake   Hydration Status:  Euvolemic   PONV Controlled:  Controlled   Airway Patency:  Patent      Post Op Vitals Reviewed: Yes      Staff: Anesthesiologist, CRNA         No notable events documented      BP   169/80   Temp   98   Pulse 88   Resp   18   SpO2   100

## 2023-01-23 NOTE — OP NOTE
Operative Note     PATIENT:  Hodan Pelayo (MRN [id number])    DATE OF PROCEDURE:   1/23/2023    PRE-OP DIAGNOSIS:   1) bilateral ureteral calculi  2  Bilateral indwelling ureteral stents  3  Colorectal cancer receiving neoadjuvant chemotherapy    POST-OP DIAGNOSIS:   1) bilateral ureteral calculi  2  Bilateral indwelling ureteral stents  3  Colorectal cancer receiving neoadjuvant chemotherapy    PROCEDURES PERFORMED:  1) Cystoscopy  2) bilateral retrograde pyelography with fluoroscopic interpretation  3) Right ureteroscopy with laser lithotripsy of stone  4) bilateral ureteral stent exchange    SURGEON:  Lucy Larson MD    NOTE:  There were no qualified teaching residents to assist with this case    ANESTHESIA: General/LMA     COMPLICATIONS:   None    ANTIBIOTICS:  Ancef    INTRAOPERATIVE THROMBOEMBOLISM PROPHYLAXIS:  Pneumatic compression stockings     FINDINGS:  This was a markedly complex endoscopic case  The right ureteral stone was highly impacted  The stone had completely worn through the proximal ureter essentially circumferentially down to the submucosal tissue  I performed an extensive and exquisitely careful ureteroscopy using both a semirigid as well as a flexible digital disposable endoscope  I kept laser settings low to minimize the risk of thermal injury to the ureter which was clearly damaged due to the size and location of the stone  A very small mount of extravasation was noted at the conclusion of the case  A fresh ureteral stent was left in place without an external string  I elected to exchange the left ureteral stent and to plan for second stage procedure as planned to manage the stone on that side  INDICATIONS FOR PROCEDURE:  Hodan Pelayo is an 56 y o  old female with bilateral proximal ureteral calculi and indwelling ureteral stents    Her urologic history is very nebulous as she was managed in a different state and recently presented through the emergency department to establish care  I evaluated the patient with a CT scan which reveals a bulky right proximal ureteral calculus and a small collection of contralateral left ureteral stones  Patient also has colorectal cancer for which she is receiving neoadjuvant chemotherapy  Due to the totality of her stone burden, and her ongoing immunosuppression with chemotherapy I did recommending staging the procedure to minimize the risk of perioperative infectious complications  I met with the patient and her ex- prior to surgery  After discussing the options, the patient elected to undergo ureteroscopy and ureteral stent placement  We discussed the procedure in detail, the alternatives, and the risks, and they signed informed consent to proceed  PROCEDURE IN DETAIL:   The patient was identified and brought to the OR  Antibiotic prophylaxis and DVT prophylaxis were administered  They were placed in the comfortable dorsal lithotomy position with care to pad all pressure points  They were prepped and draped in the usual sterile fashion using hibiclens  A surgical time out was performed with all in the room in agreement with the correct patient, procedure, indications, and laterality  A 21-[place] rigid cystoscope was used to enter the bladder  The bladder was inspected in its entirety and there were no lesions noted  The ureteral orifices were identified in their normal orthotopic positions  The Right ureteral orifice was identified and a 5 Fr open ended catheter was placed into the ureteral orifice alongside the preplaced ureteral stent  It was clear that the right proximal ureteral calculus was markedly impacted  There is minimal transit of contrast proximal to the stone on initial retrograde pyelogram in order to bypass the stone initially had to utilize a hydrophilic Glidewire  With this maneuver successfully accomplished I then was able to convert to a Bard Solo plus wire as a formal safety wire        A   7 5 Urdu semi-rigid ureteroscope was advanced up the ureter under vision   The stone was encountered in the proximal ureter  The stone was noted to be impacted  The stone was highly impacted and the ureter was of poor quality  Findings are certainly concerning for the presence and/or evolution of a ureteral stricture secondary to bulky impacted proximal stone  Is utilized a high-powered 101 holmium laser, however was sure to keep the settings very low to minimize any thermal injury as the ureter was certainly very atretic  I was able to address all of the stone burden however some portion of the stone appeared embedded within the submucosa of the ureter in multiple locations  Overall I am concerned that the patient will develop a chronic ureteral stricture secondary to this very severe situation  The stone was treated with both a semirigid as well as a flexible endoscope  I performed a retrograde pyelogram at the conclusion of the case which did demonstrate a very small amount of extravasation  The ureteroscope was backed down the ureter under vision  A JJ stent was then passed up the wire  under fluoroscopic guidance into the kidney with a good curl noted in the kidney and in the bladder    The bladder was drained  I then performed a ureteral stent exchange on the contralateral left side  The patient was placed back supine, awakened from general anesthesia and brought to recovery room in stable condition  ESTIMATED BLOOD LOSS:  Minimal      SPECIMENS:   * No orders in the log *     IMPLANTS:   Implant Name Type Inv  Item Serial No   Lot No  LRB No  Used Action   INLAY OPTIMA     [id number] Right 1 Implanted   STENT URETERAL 6FR 24CML Tiffanie Arreguin  STENT URETERAL 6FR 24CML INLAY OPTIMA  [address] [address] [id number] Left 1 Implanted        COMPLICATIONS: None    DISPOSITION: PACU    PLAN:  The time of surgery with the patient's     X-rays my concern that the patient is at high risk of developing a ureteral stricture in the right ureter  We will plan for a left ureteroscopy in the near future  I expect that postoperative stent can be removed after few days  I would likely recommend maintaining the right ureteral stent until the patient has completed her treatment for colorectal cancer

## 2023-01-24 ENCOUNTER — TELEPHONE (OUTPATIENT)
Dept: UROLOGY | Facility: CLINIC | Age: 58
End: 2023-01-24

## 2023-01-24 NOTE — TELEPHONE ENCOUNTER
PLAN:  The time of surgery with the patient's   X-rays my concern that the patient is at high risk of developing a ureteral stricture in the right ureter  We will plan for a left ureteroscopy in the near future  I expect that postoperative stent can be removed after few days  I would likely recommend maintaining the right ureteral stent until the patient has completed her treatment for colorectal cancer  Spoke with patient and   She is feeling generally ok  Complains mostly of burning while urinating  Went over all meds and when to take them  Discussed normal stent symptoms  Advised to use heating pad PRN  Advised patient of plan per ZLIZBET  Verbalized understanding  Advised them the surgery sched would reach out to them once case request was placed

## 2023-01-26 ENCOUNTER — APPOINTMENT (EMERGENCY)
Dept: CT IMAGING | Facility: HOSPITAL | Age: 58
End: 2023-01-26

## 2023-01-26 ENCOUNTER — HOSPITAL ENCOUNTER (INPATIENT)
Facility: HOSPITAL | Age: 58
LOS: 1 days | Discharge: HOME | End: 2023-01-27
Attending: EMERGENCY MEDICINE | Admitting: HOSPITALIST

## 2023-01-26 ENCOUNTER — APPOINTMENT (EMERGENCY)
Dept: RADIOLOGY | Facility: HOSPITAL | Age: 58
End: 2023-01-26

## 2023-01-26 DIAGNOSIS — E87.6 HYPOKALEMIA: ICD-10-CM

## 2023-01-26 DIAGNOSIS — R10.9 RIGHT FLANK PAIN: Primary | ICD-10-CM

## 2023-01-26 DIAGNOSIS — N13.30 HYDRONEPHROSIS: ICD-10-CM

## 2023-01-26 DIAGNOSIS — N20.0 RENAL STONE: ICD-10-CM

## 2023-01-26 DIAGNOSIS — N13.30 HYDRONEPHROSIS, BILATERAL: ICD-10-CM

## 2023-01-26 DIAGNOSIS — R79.89 ABNORMAL LFTS: ICD-10-CM

## 2023-01-26 LAB
ALBUMIN SERPL BCP-MCNC: 3.2 G/DL (ref 3.5–5)
ALP SERPL-CCNC: 147 U/L (ref 34–104)
ALT SERPL W P-5'-P-CCNC: 24 U/L (ref 7–52)
ANION GAP SERPL CALCULATED.3IONS-SCNC: 4 MMOL/L (ref 4–13)
ANION GAP SERPL CALCULATED.3IONS-SCNC: 8 MMOL/L (ref 4–13)
ANISOCYTOSIS BLD QL SMEAR: PRESENT
AST SERPL W P-5'-P-CCNC: 21 U/L (ref 13–39)
BACTERIA UR QL AUTO: ABNORMAL /HPF
BASOPHILS # BLD MANUAL: 0 THOUSAND/UL (ref 0–0.1)
BASOPHILS NFR MAR MANUAL: 0 % (ref 0–1)
BILIRUB SERPL-MCNC: 2.01 MG/DL (ref 0.2–1)
BILIRUB UR QL STRIP: NEGATIVE
BUN SERPL-MCNC: 19 MG/DL (ref 5–25)
BUN SERPL-MCNC: 21 MG/DL (ref 5–25)
CALCIUM ALBUM COR SERPL-MCNC: 9.1 MG/DL (ref 8.3–10.1)
CALCIUM SERPL-MCNC: 7.8 MG/DL (ref 8.4–10.2)
CALCIUM SERPL-MCNC: 8.5 MG/DL (ref 8.4–10.2)
CHLORIDE SERPL-SCNC: 103 MMOL/L (ref 96–108)
CHLORIDE SERPL-SCNC: 107 MMOL/L (ref 96–108)
CLARITY UR: CLEAR
CO2 SERPL-SCNC: 25 MMOL/L (ref 21–32)
CO2 SERPL-SCNC: 26 MMOL/L (ref 21–32)
COLOR UR: ABNORMAL
CREAT SERPL-MCNC: 0.68 MG/DL (ref 0.6–1.3)
CREAT SERPL-MCNC: 0.77 MG/DL (ref 0.6–1.3)
EOSINOPHIL # BLD MANUAL: 0 THOUSAND/UL (ref 0–0.4)
EOSINOPHIL NFR BLD MANUAL: 0 % (ref 0–6)
ERYTHROCYTE [DISTWIDTH] IN BLOOD BY AUTOMATED COUNT: 18.6 % (ref 11.6–15.1)
GFR SERPL CREATININE-BSD FRML MDRD: 87 ML/MIN/1.73SQ M
GFR SERPL CREATININE-BSD FRML MDRD: 98 ML/MIN/1.73SQ M
GLUCOSE SERPL-MCNC: 114 MG/DL (ref 65–140)
GLUCOSE SERPL-MCNC: 150 MG/DL (ref 65–140)
GLUCOSE UR STRIP-MCNC: NEGATIVE MG/DL
HCT VFR BLD AUTO: 30 % (ref 34.8–46.1)
HGB BLD-MCNC: 10.3 G/DL (ref 11.5–15.4)
HGB UR QL STRIP.AUTO: ABNORMAL
KETONES UR STRIP-MCNC: NEGATIVE MG/DL
LEUKOCYTE ESTERASE UR QL STRIP: ABNORMAL
LYMPHOCYTES # BLD AUTO: 2.01 THOUSAND/UL (ref 0.6–4.47)
LYMPHOCYTES # BLD AUTO: 31 % (ref 14–44)
MCH RBC QN AUTO: 31.8 PG (ref 26.8–34.3)
MCHC RBC AUTO-ENTMCNC: 34.3 G/DL (ref 31.4–37.4)
MCV RBC AUTO: 93 FL (ref 82–98)
MONOCYTES # BLD AUTO: 0.52 THOUSAND/UL (ref 0–1.22)
MONOCYTES NFR BLD: 8 % (ref 4–12)
MUCOUS THREADS UR QL AUTO: ABNORMAL
NEUTROPHILS # BLD MANUAL: 3.95 THOUSAND/UL (ref 1.85–7.62)
NEUTS BAND NFR BLD MANUAL: 2 % (ref 0–8)
NEUTS SEG NFR BLD AUTO: 59 % (ref 43–75)
NITRITE UR QL STRIP: NEGATIVE
NON-SQ EPI CELLS URNS QL MICRO: ABNORMAL /HPF
PH UR STRIP.AUTO: 7.5 [PH]
PLATELET # BLD AUTO: 206 THOUSANDS/UL (ref 149–390)
PLATELET # BLD AUTO: 297 THOUSANDS/UL (ref 149–390)
PLATELET BLD QL SMEAR: ADEQUATE
PMV BLD AUTO: 9.2 FL (ref 8.9–12.7)
PMV BLD AUTO: 9.6 FL (ref 8.9–12.7)
POIKILOCYTOSIS BLD QL SMEAR: PRESENT
POTASSIUM SERPL-SCNC: 2.6 MMOL/L (ref 3.5–5.3)
POTASSIUM SERPL-SCNC: 2.9 MMOL/L (ref 3.5–5.3)
PROT SERPL-MCNC: 5.4 G/DL (ref 6.4–8.4)
PROT UR STRIP-MCNC: ABNORMAL MG/DL
RBC # BLD AUTO: 3.24 MILLION/UL (ref 3.81–5.12)
RBC #/AREA URNS AUTO: ABNORMAL /HPF
RBC MORPH BLD: PRESENT
SODIUM SERPL-SCNC: 136 MMOL/L (ref 135–147)
SODIUM SERPL-SCNC: 137 MMOL/L (ref 135–147)
SP GR UR STRIP.AUTO: >=1.05 (ref 1–1.03)
UROBILINOGEN UR STRIP-ACNC: <2 MG/DL
WBC # BLD AUTO: 6.48 THOUSAND/UL (ref 4.31–10.16)
WBC #/AREA URNS AUTO: ABNORMAL /HPF

## 2023-01-26 RX ORDER — POTASSIUM CHLORIDE 20 MEQ/1
40 TABLET, EXTENDED RELEASE ORAL ONCE
Status: COMPLETED | OUTPATIENT
Start: 2023-01-26 | End: 2023-01-26

## 2023-01-26 RX ORDER — HYDROMORPHONE HCL/PF 1 MG/ML
1 SYRINGE (ML) INJECTION ONCE
Status: COMPLETED | OUTPATIENT
Start: 2023-01-26 | End: 2023-01-26

## 2023-01-26 RX ORDER — ENOXAPARIN SODIUM 100 MG/ML
40 INJECTION SUBCUTANEOUS DAILY
Status: DISCONTINUED | OUTPATIENT
Start: 2023-01-26 | End: 2023-01-27 | Stop reason: HOSPADM

## 2023-01-26 RX ORDER — PREDNISONE 20 MG/1
20 TABLET ORAL DAILY
Status: DISCONTINUED | OUTPATIENT
Start: 2023-01-26 | End: 2023-01-27 | Stop reason: HOSPADM

## 2023-01-26 RX ORDER — OXYBUTYNIN CHLORIDE 5 MG/1
5 TABLET ORAL EVERY 6 HOURS PRN
Status: DISCONTINUED | OUTPATIENT
Start: 2023-01-26 | End: 2023-01-27

## 2023-01-26 RX ORDER — ONDANSETRON 2 MG/ML
4 INJECTION INTRAMUSCULAR; INTRAVENOUS EVERY 6 HOURS PRN
Status: DISCONTINUED | OUTPATIENT
Start: 2023-01-26 | End: 2023-01-27 | Stop reason: HOSPADM

## 2023-01-26 RX ORDER — OXYCODONE HYDROCHLORIDE 5 MG/1
5 TABLET ORAL EVERY 6 HOURS PRN
Status: DISCONTINUED | OUTPATIENT
Start: 2023-01-26 | End: 2023-01-27 | Stop reason: HOSPADM

## 2023-01-26 RX ORDER — PREDNISONE 10 MG/1
10 TABLET ORAL
Status: DISCONTINUED | OUTPATIENT
Start: 2023-01-26 | End: 2023-01-27 | Stop reason: HOSPADM

## 2023-01-26 RX ORDER — OXYCODONE HYDROCHLORIDE 10 MG/1
10 TABLET ORAL EVERY 6 HOURS PRN
Status: DISCONTINUED | OUTPATIENT
Start: 2023-01-26 | End: 2023-01-27 | Stop reason: HOSPADM

## 2023-01-26 RX ORDER — ONDANSETRON 2 MG/ML
4 INJECTION INTRAMUSCULAR; INTRAVENOUS ONCE
Status: COMPLETED | OUTPATIENT
Start: 2023-01-26 | End: 2023-01-26

## 2023-01-26 RX ORDER — KETOROLAC TROMETHAMINE 30 MG/ML
15 INJECTION, SOLUTION INTRAMUSCULAR; INTRAVENOUS ONCE
Status: COMPLETED | OUTPATIENT
Start: 2023-01-26 | End: 2023-01-26

## 2023-01-26 RX ORDER — HYDROMORPHONE HCL/PF 1 MG/ML
0.5 SYRINGE (ML) INJECTION ONCE
Status: DISCONTINUED | OUTPATIENT
Start: 2023-01-26 | End: 2023-01-27 | Stop reason: HOSPADM

## 2023-01-26 RX ORDER — POTASSIUM CHLORIDE 14.9 MG/ML
20 INJECTION INTRAVENOUS ONCE
Status: COMPLETED | OUTPATIENT
Start: 2023-01-26 | End: 2023-01-26

## 2023-01-26 RX ORDER — ACETAMINOPHEN 325 MG/1
650 TABLET ORAL EVERY 6 HOURS PRN
Status: DISCONTINUED | OUTPATIENT
Start: 2023-01-26 | End: 2023-01-27 | Stop reason: HOSPADM

## 2023-01-26 RX ORDER — CALCIUM CARBONATE 200(500)MG
500 TABLET,CHEWABLE ORAL 3 TIMES DAILY PRN
Status: DISCONTINUED | OUTPATIENT
Start: 2023-01-26 | End: 2023-01-27 | Stop reason: HOSPADM

## 2023-01-26 RX ORDER — TAMSULOSIN HYDROCHLORIDE 0.4 MG/1
0.4 CAPSULE ORAL
Status: DISCONTINUED | OUTPATIENT
Start: 2023-01-26 | End: 2023-01-27 | Stop reason: HOSPADM

## 2023-01-26 RX ORDER — HYDROMORPHONE HCL/PF 1 MG/ML
0.5 SYRINGE (ML) INJECTION EVERY 6 HOURS PRN
Status: DISCONTINUED | OUTPATIENT
Start: 2023-01-26 | End: 2023-01-27 | Stop reason: HOSPADM

## 2023-01-26 RX ADMIN — KETOROLAC TROMETHAMINE 15 MG: 30 INJECTION, SOLUTION INTRAMUSCULAR at 08:22

## 2023-01-26 RX ADMIN — HYDROMORPHONE HYDROCHLORIDE 1 MG: 1 INJECTION, SOLUTION INTRAMUSCULAR; INTRAVENOUS; SUBCUTANEOUS at 08:22

## 2023-01-26 RX ADMIN — TAMSULOSIN HYDROCHLORIDE 0.4 MG: 0.4 CAPSULE ORAL at 21:58

## 2023-01-26 RX ADMIN — POTASSIUM CHLORIDE 40 MEQ: 1500 TABLET, EXTENDED RELEASE ORAL at 20:17

## 2023-01-26 RX ADMIN — ENOXAPARIN SODIUM 40 MG: 40 INJECTION SUBCUTANEOUS at 18:24

## 2023-01-26 RX ADMIN — POTASSIUM CHLORIDE 40 MEQ: 1500 TABLET, EXTENDED RELEASE ORAL at 18:25

## 2023-01-26 RX ADMIN — PREDNISONE 10 MG: 10 TABLET ORAL at 21:58

## 2023-01-26 RX ADMIN — POTASSIUM CHLORIDE 20 MEQ: 14.9 INJECTION, SOLUTION INTRAVENOUS at 10:36

## 2023-01-26 RX ADMIN — IOHEXOL 100 ML: 350 INJECTION, SOLUTION INTRAVENOUS at 09:08

## 2023-01-26 RX ADMIN — ONDANSETRON 4 MG: 2 INJECTION INTRAMUSCULAR; INTRAVENOUS at 10:44

## 2023-01-26 RX ADMIN — POTASSIUM CHLORIDE 40 MEQ: 1500 TABLET, EXTENDED RELEASE ORAL at 10:03

## 2023-01-26 RX ADMIN — PREDNISONE 20 MG: 20 TABLET ORAL at 18:24

## 2023-01-26 NOTE — ED NOTES
Unable to provide urine sample at this time, instructed to use call bell when she can go     [facility]  01/26/23 3077

## 2023-01-26 NOTE — H&P
Daniele  H&P- Amarilys Parvez 1965, 56 y o  female MRN: [id number]  Unit/Bed#: ED-21 Encounter: [id number]  Primary Care Provider: No primary care provider on file  Date and time admitted to hospital: 1/26/2023  7:55 AM    * R flank pain with bilateral hydronephrosis  Assessment & Plan  · POA with right flank pain  History of bilateral ureteral stents that were encrusted now s/p right sided lithotripsy with bilateral ureteral stent placed on 1/23/23  · Review of op note and there was concern for impacted stone and ureteral stricture  · Repeat CT scan shows Moderate right hydronephrosis similar to prior study and mild progressive renal pyelitis; left sided hydro is improved   · UA with leuks and RBCs but no bacteria  · No indication for abx   · ?postprocedural pain or 2/2 unresolved hydronephrosis  · Pain control, antiemetics ordered   · Urology consult for input     Hypokalemia  Assessment & Plan  · Noted on admission labs  · Status post 36 M EQ oral and 20 M EQ IV  · We will give an additional 36 M EQ of oral supplement  · Monitor on telemetry  · Repeat BMP later today  Malignant neoplasm of sigmoid colon [facility])  Assessment & Plan  · S/p resection, currently patient has colostomy  · Outpatient oncology follow-up  C  difficile colitis  Assessment & Plan  · Noted; no plan for abx, but if needed would start oral vanco ppx  Sarcoidosis  Assessment & Plan  · Currently asymptomatic, continue on prednisone  VTE Pharmacologic Prophylaxis: VTE Score: 4 Moderate Risk (Score 3-4) - Pharmacological DVT Prophylaxis Ordered: enoxaparin (Lovenox)  Code Status:  FULL   Discussion with family: Updated  (ex- ) via phone      Anticipated Length of Stay: Patient will be admitted on an inpatient basis with an anticipated length of stay of greater than 2 midnights secondary to R flank pain in patient with complicated urologic history with recent lithotripsy and stent placement   Total Time for Visit, including Counseling / Coordination of Care: 60 minutes Greater than 50% of this total time spent on direct patient counseling and coordination of care  Chief Complaint: R flank pain     History of Present Illness:  Bhavna Hernandez is a 56 y o  female with a PMH of colon cancer status post partial colectomy and chemotherapy, sarcoidosis on prednisone who presents with R flank pain  Patient was recently admitted and found to have bilateral hydronephrosis secondary to encrusted ureteral stents  During that admission patient did receive several days of antibiotics with eventual outpatient ureteral stent exchange  Stent exchange on 1/23  Since that time patient has noted some intermittent right flank pain which worsened prior to coming to the ED  It was associated with nausea and an episode of emesis  Patient received pain medication in the ED and reports her pain is now fairly well controlled  No current nausea  Denies diarrhea or excessive ostomy output  Review of Systems:  Review of Systems   Constitutional: Negative  Negative for chills and fever  HENT: Negative  Respiratory: Negative  Cardiovascular: Negative  Gastrointestinal: Positive for abdominal pain, nausea and vomiting  Genitourinary: Positive for flank pain  Negative for decreased urine volume, difficulty urinating, dysuria and urgency  Neurological: Negative  Psychiatric/Behavioral: Negative  All other systems reviewed and are negative        Past Medical and Surgical History:   Past Medical History:   Diagnosis Date   • Colon cancer [facility])    • Fall    • Headache    • Hemochromatosis, unspecified    • History of transfusion     2022 - no adverse reaction   • Kidney calculi    • Kidney stone    • Liver disease     hemangioma   • Muscle weakness    • Sarcoidosis    • Seizures ([facility] [address] )     2022       Past Surgical History:   Procedure Laterality Date   • ABSCESS DRAINAGE      left breast • CHEST TUBE INSERTION     • COLON SURGERY      colostomy   • COLONOSCOPY     • LUNG BIOPSY     • MS CYSTO/URETERO W/LITHOTRIPSY &INDWELL STENT INSRT Bilateral 1/23/2023    Procedure: CYSTOSCOPY  RIGHT URETEROSCOPY WITH LITHOTRIPSY HOLMIUM LASER, BILATERAL RETROGRADE PYELOGRAM AND BILATERAL  URETERAL STENT INSERTION;  Surgeon: Evens Garay MD;  Location: [facility];  Service: Urology   • TONSILLECTOMY     • URINARY SURGERY Bilateral     bilateral stents       Meds/Allergies:  Prior to Admission medications    Medication Sig Start Date End Date Taking?  Authorizing Provider   acetaminophen (TYLENOL) 325 mg tablet Take 650 mg by mouth every 6 (six) hours as needed for mild pain    Historical Provider, MD   acetaminophen (TYLENOL) 325 mg tablet Take 2 tablets (650 mg total) by mouth every 4 (four) hours as needed for mild pain 1/23/23   Evens Garay MD   CAPECITABINE PO Take 1,650 mg by mouth 2 (two) times a day    Historical Provider, MD   cephalexin (KEFLEX) 500 mg capsule Take 1 capsule (500 mg total) by mouth every 12 (twelve) hours for 3 days 1/23/23 1/26/23  Evens Garay MD   cyanocobalamin (VITAMIN B-12) 1000 MCG tablet Take 1,000 mcg by mouth daily    Historical Provider, MD   diclofenac sodium (VOLTAREN) 50 mg EC tablet Take 1 tablet (50 mg total) by mouth 3 (three) times a day for 7 days 1/23/23 1/30/23  Evens Garay MD   docusate sodium (COLACE) 100 mg capsule Take 1 capsule (100 mg total) by mouth 2 (two) times a day for 15 days 1/23/23 2/7/23  Evens Garay MD   oxybutynin (DITROPAN) 5 mg tablet Take 1 tablet (5 mg total) by mouth every 6 (six) hours as needed (bladder spasms) 1/23/23   Evens Garay MD   oxyCODONE (ROXICODONE) 5 immediate release tablet Take 1 tablet (5 mg total) by mouth every 4 (four) hours as needed for severe pain for up to 5 days Max Daily Amount: 30 mg 1/23/23 1/28/23  Evens Garay MD   phenazopyridine (PYRIDIUM) 100 mg tablet Take 2 tablets (200 mg total) by mouth 3 (three) times a day as needed for bladder spasms 1/7/23   Keisha Redd PA-C   phenazopyridine (PYRIDIUM) 200 mg tablet Take 1 tablet (200 mg total) by mouth 3 (three) times a day as needed (Pain with urination) for up to 3 days 1/23/23 1/26/23  Karla Peralta MD   POTASSIUM PO Take by mouth    Historical Provider, MD   predniSONE 10 mg tablet Take 10 mg by mouth daily at bedtime    Historical Provider, MD   predniSONE 20 mg tablet Take 20 mg by mouth in the morning Takes in am    Historical Provider, MD   tamsulosin (FLOMAX) 0 4 mg Take 1 capsule (0 4 mg total) by mouth daily at bedtime 1/7/23   Keisha Redd PA-C     I have reviewed home medications using recent Epic encounter  Allergies: No Known Allergies    Social History:  Marital Status: Single   Occupation:   Patient Pre-hospital Living Situation: Home, Apartment  Patient Pre-hospital Level of Mobility: walks  Patient Pre-hospital Diet Restrictions:   Substance Use History:   Social History     Substance and Sexual Activity   Alcohol Use Never     Social History     Tobacco Use   Smoking Status Never   • Passive exposure: Past   Smokeless Tobacco Never     Social History     Substance and Sexual Activity   Drug Use Never       Family History:  History reviewed  No pertinent family history  Physical Exam:     Vitals:   Blood Pressure: 140/67 (01/26/23 1046)  Pulse: 82 (01/26/23 1046)  Temperature: 97 6 °F (36 4 °C) (01/26/23 0757)  Temp Source: Oral (01/26/23 0757)  Respirations: 18 (01/26/23 1046)  SpO2: 100 % (01/26/23 1046)    Physical Exam  Vitals and nursing note reviewed  Constitutional:       General: She is not in acute distress  Appearance: Normal appearance  She is not ill-appearing or toxic-appearing  HENT:      Head: Normocephalic and atraumatic  Cardiovascular:      Rate and Rhythm: Normal rate and regular rhythm  Heart sounds: No murmur heard    Pulmonary:      Effort: Pulmonary effort is normal  No respiratory distress  Breath sounds: Normal breath sounds  No wheezing  Abdominal:      General: Abdomen is flat  There is no distension  Palpations: Abdomen is soft  Tenderness: There is no abdominal tenderness  There is no right CVA tenderness or left CVA tenderness  Comments: Ostomy with solid stool    Musculoskeletal:         General: No swelling  Right lower leg: No edema  Left lower leg: No edema  Neurological:      General: No focal deficit present  Mental Status: She is alert and oriented to person, place, and time  Psychiatric:         Mood and Affect: Mood normal          Behavior: Behavior normal           Additional Data:     Lab Results:  Results from last 7 days   Lab Units 01/26/23  0823   WBC Thousand/uL 6 48   HEMOGLOBIN g/dL 10 3*   HEMATOCRIT % 30 0*   PLATELETS Thousands/uL 297   BANDS PCT % 2   LYMPHO PCT % 31   MONO PCT % 8   EOS PCT % 0     Results from last 7 days   Lab Units 01/26/23  0823   SODIUM mmol/L 137   POTASSIUM mmol/L 2 6*   CHLORIDE mmol/L 103   CO2 mmol/L 26   BUN mg/dL 21   CREATININE mg/dL 0 68   ANION GAP mmol/L 8   CALCIUM mg/dL 8 5   ALBUMIN g/dL 3 2*   TOTAL BILIRUBIN mg/dL 2 01*   ALK PHOS U/L 147*   ALT U/L 24   AST U/L 21   GLUCOSE RANDOM mg/dL 114                       Lines/Drains:  Invasive Devices     Peripheral Intravenous Line  Duration           Peripheral IV 01/26/23 Left Antecubital <1 day          Drain  Duration           Colostomy Descending/sigmoid LLQ 59 days    Ureteral Internal Stent Left ureter 6 Fr  2 days    Ureteral Internal Stent Right ureter 6 Fr  2 days                Imaging: Reviewed radiology reports from this admission including: abdominal/pelvic CT  CT abdomen pelvis with contrast   Final Result by Robert Rain MD (01/26 0950)      Moderate right hydronephrosis with right nephroureteral stent in place similar to prior study   Proximal right ureteral calcification has decreased with new right renal lower pole stone fragments, 1 3 cm and smaller attributed to post lithotripsy    retrograde migration  Trace residual albeit markedly improved left hydronephrosis with left nephroureteral stent in place  Stable proximal left ureteral calculi, 3 mm and smaller  Punctate distal left ureteral calculus present previously is no longer visualized  Stable left    renal calculi, 4 mm and smaller  Mildly progressive right renal pyelitis in the left renal lower pole calyceal hyperemia may be residual inflammation from recent procedure  Correlate with UA to exclude UTI  No perinephric collection  Stable minimal gallbladder wall thickening without calcified gallstones  This may be sequela of chronic hepatocellular disease, partially contracted gallbladder or nonspecific chronic inflammation  This could be followed up with nonemergent gallbladder    ultrasound  Stable 1 cm right adrenal nodule  Although its imaging features are indeterminate, it does not have suspicious imaging features (heterogeneity, necrosis, irregular margins), therefore this is likely benign, and can be followed by non-contrast abdomen CT    or MRI in 12 months  If patient has history of adrenal hyperfunction, consider biochemical evaluation  Adrenal recommendation based on institutional consensus and Journal of [employer] of Radiology 2017;14:2799-2059      This study demonstrates a significant  finding and was documented as such in Epic for liaison and referring practitioner notification  This study demonstrates a significant  finding and was documented as such in Epic for liaison and referring practitioner notification  Workstation performed: [id number]             EKG and Other Studies Reviewed on Admission:   · EKG: No EKG obtained  ** Please Note: This note has been constructed using a voice recognition system   **

## 2023-01-26 NOTE — PLAN OF CARE
Problem: PAIN - ADULT  Goal: Verbalizes/displays adequate comfort level or baseline comfort level  Description: Interventions:  - Encourage patient to monitor pain and request assistance  - Assess pain using appropriate pain scale  - Administer analgesics based on type and severity of pain and evaluate response  - Implement non-pharmacological measures as appropriate and evaluate response  - Consider cultural and social influences on pain and pain management  - Notify physician/advanced practitioner if interventions unsuccessful or patient reports new pain  Outcome: Progressing     Problem: INFECTION - ADULT  Goal: Absence or prevention of progression during hospitalization  Description: INTERVENTIONS:  - Assess and monitor for signs and symptoms of infection  - Monitor lab/diagnostic results  - Monitor all insertion sites, i e  indwelling lines, tubes, and drains  - Monitor endotracheal if appropriate and nasal secretions for changes in amount and color  - Zamora appropriate cooling/warming therapies per order  - Administer medications as ordered  - Instruct and encourage patient and family to use good hand hygiene technique  - Identify and instruct in appropriate isolation precautions for identified infection/condition  Outcome: Progressing  Goal: Absence of fever/infection during neutropenic period  Description: INTERVENTIONS:  - Monitor WBC    Outcome: Progressing     Problem: SAFETY ADULT  Goal: Patient will remain free of falls  Description: INTERVENTIONS:  - Educate patient/family on patient safety including physical limitations  - Instruct patient to call for assistance with activity   - Consult OT/PT to assist with strengthening/mobility   - Keep Call bell within reach  - Keep bed low and locked with side rails adjusted as appropriate  - Keep care items and personal belongings within reach  - Initiate and maintain comfort rounds  - Make Fall Risk Sign visible to staff  - Offer Toileting every  Hours, in advance of need  - Initiate/Maintain alarm  - Obtain necessary fall risk management equipment:   - Apply yellow socks and bracelet for high fall risk patients  - Consider moving patient to room near nurses station  Outcome: Progressing  Goal: Maintain or return to baseline ADL function  Description: INTERVENTIONS:  -  Assess patient's ability to carry out ADLs; assess patient's baseline for ADL function and identify physical deficits which impact ability to perform ADLs (bathing, care of mouth/teeth, toileting, grooming, dressing, etc )  - Assess/evaluate cause of self-care deficits   - Assess range of motion  - Assess patient's mobility; develop plan if impaired  - Assess patient's need for assistive devices and provide as appropriate  - Encourage maximum independence but intervene and supervise when necessary  - Involve family in performance of ADLs  - Assess for home care needs following discharge   - Consider OT consult to assist with ADL evaluation and planning for discharge  - Provide patient education as appropriate  Outcome: Progressing  Goal: Maintains/Returns to pre admission functional level  Description: INTERVENTIONS:  - Perform BMAT or MOVE assessment daily    - Set and communicate daily mobility goal to care team and patient/family/caregiver  - Collaborate with rehabilitation services on mobility goals if consulted  - Perform Range of Motion  times a day  - Reposition patient every  hours    - Dangle patient  times a day  - Stand patient  times a day  - Ambulate patient  times a day  - Out of bed to chair  times a day   - Out of bed for meals times a day  - Out of bed for toileting  - Record patient progress and toleration of activity level   Outcome: Progressing     Problem: DISCHARGE PLANNING  Goal: Discharge to home or other facility with appropriate resources  Description: INTERVENTIONS:  - Identify barriers to discharge w/patient and caregiver  - Arrange for needed discharge resources and transportation as appropriate  - Identify discharge learning needs (meds, wound care, etc )  - Arrange for interpretive services to assist at discharge as needed  - Refer to Case Management Department for coordinating discharge planning if the patient needs post-hospital services based on physician/advanced practitioner order or complex needs related to functional status, cognitive ability, or social support system  Outcome: Progressing     Problem: Knowledge Deficit  Goal: Patient/family/caregiver demonstrates understanding of disease process, treatment plan, medications, and discharge instructions  Description: Complete learning assessment and assess knowledge base    Interventions:  - Provide teaching at level of understanding  - Provide teaching via preferred learning methods  Outcome: Progressing

## 2023-01-26 NOTE — ASSESSMENT & PLAN NOTE
· POA with right flank pain    History of bilateral ureteral stents that were encrusted now s/p right sided lithotripsy with bilateral ureteral stent placed on 1/23/23  · Review of op note and there was concern for impacted stone and ureteral stricture  · Repeat CT scan shows Moderate right hydronephrosis similar to prior study and mild progressive renal pyelitis; left sided hydro is improved   · UA with leuks and RBCs but no bacteria  · No indication for abx   · ?postprocedural pain or 2/2 unresolved hydronephrosis  · Pain control, antiemetics ordered   · Urology consult for input

## 2023-01-26 NOTE — ED PROVIDER NOTES
History  Chief Complaint   Patient presents with   • Flank Pain     Pt c/o R flank pain  States on Monday she had a lithotripsy for a stone  Bob Nelson is a 56 y o  female who presents with the chief complaint of right sided flank pain  She had lithotripsy of a stone on Monday on the same side as well as an exchange of existing ureteral stents bilaterally  The operative note indicates the stone on the right was impacted and had partway eroded into the ureteral wall  Patient has a history of colon cancer and does have a colostomy - partial colectomy performed late last year  Stool is normal in color, no blood, no change in character  No fevers or chills  History provided by:  Patient and medical records   used: No    Flank Pain  Pain location:  R flank  Pain quality: aching, sharp and stabbing    Pain radiates to:  Does not radiate  Pain severity:  Severe  Onset quality:  Gradual  Duration:  1 day  Timing:  Constant  Progression:  Waxing and waning  Chronicity:  Recurrent  Context: previous surgery    Relieved by:  Nothing  Worsened by:  Nothing  Ineffective treatments: oxycodone  Associated symptoms: nausea    Associated symptoms: no chest pain, no chills, no diarrhea, no dysuria, no fever, no shortness of breath and no vomiting    Risk factors: multiple surgeries        Prior to Admission Medications   Prescriptions Last Dose Informant Patient Reported? Taking?    CAPECITABINE PO   Yes No   Sig: Take 1,650 mg by mouth 2 (two) times a day   POTASSIUM PO   Yes No   Sig: Take by mouth   acetaminophen (TYLENOL) 325 mg tablet   Yes No   Sig: Take 650 mg by mouth every 6 (six) hours as needed for mild pain   acetaminophen (TYLENOL) 325 mg tablet   No No   Sig: Take 2 tablets (650 mg total) by mouth every 4 (four) hours as needed for mild pain   cephalexin (KEFLEX) 500 mg capsule   No No   Sig: Take 1 capsule (500 mg total) by mouth every 12 (twelve) hours for 3 days   cyanocobalamin (VITAMIN B-12) 1000 MCG tablet   Yes No   Sig: Take 1,000 mcg by mouth daily   diclofenac sodium (VOLTAREN) 50 mg EC tablet   No No   Sig: Take 1 tablet (50 mg total) by mouth 3 (three) times a day for 7 days   docusate sodium (COLACE) 100 mg capsule   No No   Sig: Take 1 capsule (100 mg total) by mouth 2 (two) times a day for 15 days   oxyCODONE (ROXICODONE) 5 immediate release tablet   No No   Sig: Take 1 tablet (5 mg total) by mouth every 4 (four) hours as needed for severe pain for up to 5 days Max Daily Amount: 30 mg   oxybutynin (DITROPAN) 5 mg tablet   No No   Sig: Take 1 tablet (5 mg total) by mouth every 6 (six) hours as needed (bladder spasms)   phenazopyridine (PYRIDIUM) 100 mg tablet   No No   Sig: Take 2 tablets (200 mg total) by mouth 3 (three) times a day as needed for bladder spasms   phenazopyridine (PYRIDIUM) 200 mg tablet   No No   Sig: Take 1 tablet (200 mg total) by mouth 3 (three) times a day as needed (Pain with urination) for up to 3 days   predniSONE 10 mg tablet   Yes No   Sig: Take 10 mg by mouth daily at bedtime   predniSONE 20 mg tablet   Yes No   Sig: Take 20 mg by mouth in the morning Takes in am   tamsulosin (FLOMAX) 0 4 mg   No No   Sig: Take 1 capsule (0 4 mg total) by mouth daily at bedtime      Facility-Administered Medications: None       Past Medical History:   Diagnosis Date   [facility]    • Fall    • Headache    • Hemochromatosis, unspecified    • History of transfusion     2022 - no adverse reaction   • Kidney calculi    • Kidney stone    • Liver disease     hemangioma   • Muscle weakness    • Sarcoidosis    • Seizures ([facility] )     2022       Past Surgical History:   Procedure Laterality Date   • ABSCESS DRAINAGE      left breast   • CHEST TUBE INSERTION     • COLON SURGERY      colostomy   • COLONOSCOPY     • LUNG BIOPSY     • WI CYSTO/URETERO W/LITHOTRIPSY &INDWELL STENT INSRT Bilateral 1/23/2023    Procedure: CYSTOSCOPY  RIGHT URETEROSCOPY WITH LITHOTRIPSY HOLMIUM LASER, BILATERAL RETROGRADE PYELOGRAM AND BILATERAL  URETERAL STENT INSERTION;  Surgeon: Carine Lyons MD;  Location: [facility];  Service: Urology   • TONSILLECTOMY     • URINARY SURGERY Bilateral     bilateral stents       History reviewed  No pertinent family history  I have reviewed and agree with the history as documented  E-Cigarette/Vaping   • E-Cigarette Use Never User      E-Cigarette/Vaping Substances   • Nicotine No    • THC No    • CBD No      Social History     Tobacco Use   • Smoking status: Never     Passive exposure: Past   • Smokeless tobacco: Never   Vaping Use   • Vaping Use: Never used   Substance Use Topics   • Alcohol use: Never   • Drug use: Never       Review of Systems   Constitutional: Negative for chills, diaphoresis and fever  Respiratory: Negative for shortness of breath  Cardiovascular: Negative for chest pain and palpitations  Gastrointestinal: Positive for nausea  Negative for diarrhea and vomiting  Genitourinary: Positive for flank pain  Negative for dysuria and frequency  Skin: Negative for rash  All other systems reviewed and are negative  Physical Exam  Physical Exam  Vitals and nursing note reviewed  Constitutional:       General: She is in acute distress  Appearance: She is well-developed  HENT:      Head: Normocephalic and atraumatic  Eyes:      Pupils: Pupils are equal, round, and reactive to light  Neck:      Vascular: No JVD  Cardiovascular:      Rate and Rhythm: Normal rate and regular rhythm  Heart sounds: Normal heart sounds  No murmur heard  No friction rub  No gallop  Pulmonary:      Effort: Pulmonary effort is normal  No respiratory distress  Breath sounds: Normal breath sounds  No wheezing or rales  Chest:      Chest wall: No tenderness  Abdominal:      Palpations: Abdomen is soft  Tenderness: There is no abdominal tenderness  There is no guarding        Comments: Left sided colostomy bag noted, normal brown stool in bag  Musculoskeletal:         General: No tenderness  Normal range of motion  Cervical back: Normal range of motion  Skin:     General: Skin is warm and dry  Neurological:      General: No focal deficit present  Mental Status: She is alert and oriented to person, place, and time  Psychiatric:         Behavior: Behavior normal          Thought Content:  Thought content normal          Judgment: Judgment normal          Vital Signs  ED Triage Vitals [01/26/23 0757]   Temperature Pulse Respirations Blood Pressure SpO2   97 6 °F (36 4 °C) 89 16 156/85 98 %      Temp Source Heart Rate Source Patient Position - Orthostatic VS BP Location FiO2 (%)   Oral Monitor Sitting Right arm --      Pain Score       8           Vitals:    01/26/23 0757 01/26/23 0827 01/26/23 0927 01/26/23 1046   BP: 156/85 156/85 166/78 140/67   Pulse: 89 84 89 82   Patient Position - Orthostatic VS: Sitting Lying Lying Sitting         Visual Acuity      ED Medications  Medications   HYDROmorphone (DILAUDID) injection 0 5 mg (0 5 mg Intravenous Not Given 1/26/23 1004)   potassium chloride 20 mEq IVPB (premix) (20 mEq Intravenous New Bag 1/26/23 1036)   HYDROmorphone (DILAUDID) injection 1 mg (1 mg Intravenous Given 1/26/23 0822)   ketorolac (TORADOL) injection 15 mg (15 mg Intravenous Given 1/26/23 0822)   iohexol (OMNIPAQUE) 350 MG/ML injection (SINGLE-DOSE) 100 mL (100 mL Intravenous Given 1/26/23 0908)   potassium chloride (K-DUR,KLOR-CON) CR tablet 40 mEq (40 mEq Oral Given 1/26/23 1003)   ondansetron (ZOFRAN) injection 4 mg (4 mg Intravenous Given 1/26/23 1044)       Diagnostic Studies  Results Reviewed     Procedure Component Value Units Date/Time    UA w Reflex to Microscopic w Reflex to Culture [[id number]]  (Abnormal) Collected: 01/26/23 1043    Lab Status: Final result Specimen: Urine, Clean Catch Updated: 01/26/23 1124     Color, UA Light Yellow     Clarity, UA Clear     Specific Gravity, UA >=1 050     pH, UA 7 5     Leukocytes, UA Small     Nitrite, UA Negative     Protein, UA Trace mg/dl      Glucose, UA Negative mg/dl      Ketones, UA Negative mg/dl      Urobilinogen, UA <2 0 mg/dl      Bilirubin, UA Negative     Occult Blood, UA Moderate    Urine Microscopic [[id number]] Collected: 01/26/23 1043    Lab Status: In process Specimen: Urine, Clean Catch Updated: 01/26/23 1124    Comprehensive metabolic panel [[id number]]  (Abnormal) Collected: 01/26/23 0823    Lab Status: Final result Specimen: Blood from Arm, Left Updated: 01/26/23 0950     Sodium 137 mmol/L      Potassium 2 6 mmol/L      Chloride 103 mmol/L      CO2 26 mmol/L      ANION GAP 8 mmol/L      BUN 21 mg/dL      Creatinine 0 68 mg/dL      Glucose 114 mg/dL      Calcium 8 5 mg/dL      Corrected Calcium 9 1 mg/dL      AST 21 U/L      ALT 24 U/L      Alkaline Phosphatase 147 U/L      Total Protein 5 4 g/dL      Albumin 3 2 g/dL      Total Bilirubin 2 01 mg/dL      eGFR 98 ml/min/1 73sq m     Narrative:      [facility] guidelines for Chronic Kidney Disease (CKD):   •  Stage 1 with normal or high GFR (GFR > 90 mL/min/1 73 square meters)  •  Stage 2 Mild CKD (GFR = 60-89 mL/min/1 73 square meters)  •  Stage 3A Moderate CKD (GFR = 45-59 mL/min/1 73 square meters)  •  Stage 3B Moderate CKD (GFR = 30-44 mL/min/1 73 square meters)  •  Stage 4 Severe CKD (GFR = 15-29 mL/min/1 73 square meters)  •  Stage 5 End Stage CKD (GFR <15 mL/min/1 73 square meters)  Note: GFR calculation is accurate only with a steady state creatinine    CBC and differential [[id number]]  (Abnormal) Collected: 01/26/23 0823    Lab Status: Final result Specimen: Blood from Arm, Left Updated: 01/26/23 0913     WBC 6 48 Thousand/uL      RBC 3 24 Million/uL      Hemoglobin 10 3 g/dL      Hematocrit 30 0 %      MCV 93 fL      MCH 31 8 pg      MCHC 34 3 g/dL      RDW 18 6 %      MPV 9 6 fL      Platelets 215 Thousands/uL     Narrative: This is an appended report    These results have been appended to a previously verified report  Manual Differential(PHLEBS Do Not Order) [[id number]] Collected: 01/26/23 0823    Lab Status: Final result Specimen: Blood from Arm, Left Updated: 01/26/23 0913     Segmented % 59 %      Bands % 2 %      Lymphocytes % 31 %      Monocytes % 8 %      Eosinophils, % 0 %      Basophils % 0 %      Absolute Neutrophils 3 95 Thousand/uL      Lymphocytes Absolute 2 01 Thousand/uL      Monocytes Absolute 0 52 Thousand/uL      Eosinophils Absolute 0 00 Thousand/uL      Basophils Absolute 0 00 Thousand/uL      Total Counted --     RBC Morphology Present     Anisocytosis Present     Poikilocytes Present     Platelet Estimate Adequate                 CT abdomen pelvis with contrast   Final Result by Michael Deluna MD (01/26 0950)      Moderate right hydronephrosis with right nephroureteral stent in place similar to prior study  Proximal right ureteral calcification has decreased with new right renal lower pole stone fragments, 1 3 cm and smaller attributed to post lithotripsy    retrograde migration  Trace residual albeit markedly improved left hydronephrosis with left nephroureteral stent in place  Stable proximal left ureteral calculi, 3 mm and smaller  Punctate distal left ureteral calculus present previously is no longer visualized  Stable left    renal calculi, 4 mm and smaller  Mildly progressive right renal pyelitis in the left renal lower pole calyceal hyperemia may be residual inflammation from recent procedure  Correlate with UA to exclude UTI  No perinephric collection  Stable minimal gallbladder wall thickening without calcified gallstones  This may be sequela of chronic hepatocellular disease, partially contracted gallbladder or nonspecific chronic inflammation  This could be followed up with nonemergent gallbladder    ultrasound  Stable 1 cm right adrenal nodule   Although its imaging features are indeterminate, it does not have suspicious imaging features (heterogeneity, necrosis, irregular margins), therefore this is likely benign, and can be followed by non-contrast abdomen CT    or MRI in 12 months  If patient has history of adrenal hyperfunction, consider biochemical evaluation  Adrenal recommendation based on institutional consensus and Journal of [facility] of Radiology 2017;14:[id number]      This study demonstrates a significant  finding and was documented as such in [facility] for liaison and referring practitioner notification  This study demonstrates a significant  finding and was documented as such in [facility] for liaison and referring practitioner notification  Workstation performed: [id number]                    Procedures  Procedures         ED Course  ED Course as of 01/26/23 1133   Thu Jan 26, 2023   3843 Patient appears much more comfortable but reports that her pain is starting to come back  Will order an additional dose of pain medication  Medical Decision Making  Background: 56 y o  female presents with flank pain    Differential DX includes but is not limited to: ureteral spasm from stent, ureteral erosion,  hydronephrosis, pyelonephritis, colitis, recurrence of CA, less likely sbo, muscular pain    Plan: cbc, cmp, urinalysis, ct abdomen and pelvis with contrast, symptom control         Abnormal LFTs: acute illness or injury  Hydronephrosis: acute illness or injury  Hypokalemia: self-limited or minor problem  Renal stone: acute illness or injury  Right flank pain: acute illness or injury  Amount and/or Complexity of Data Reviewed  Labs: ordered  Radiology: ordered  Discussion of management or test interpretation with external provider(s): I discussed the patient with Michael Part of urology, we reviewed ct findings and available labs    She was ok with discharge if the patient's symptoms could be controlled but the patient's pain returned with nausea and vomiting  I will bring the patient into [facility] with a formal consult to urology  Risk  Prescription drug management  Decision regarding hospitalization  Disposition  Final diagnoses:   Right flank pain   Hydronephrosis - acute right sided   Hypokalemia   Renal stone   Abnormal LFTs     Time reflects when diagnosis was documented in both MDM as applicable and the Disposition within this note     Time User Action Codes Description Comment    1/26/2023 10:10 AM Diamante Litten B Add [R10 9] Right flank pain     1/26/2023 10:10 AM Diamante Litten B Add [N13 30] Hydronephrosis     1/26/2023 10:11 AM Diamante Litten B Modify [N13 30] Hydronephrosis acute right sided    1/26/2023 10:11 AM Diamante Litten B Add [E87 6] Hypokalemia     1/26/2023 10:11 AM Diamante Litten B Add [N20 0] Renal stone     1/26/2023 10:11 AM Wyvonna Hams Add [R79 89] Abnormal LFTs       ED Disposition     ED Disposition   Admit    Condition   Stable    Date/Time   Thu Jan 26, 2023 11:30 AM    Comment   Case was discussed with Dr Regina Hwang and the patient's admission status was agreed to be Admission Status: inpatient status to the service of Dr Regina Hwang   Follow-up Information    None         Patient's Medications   Discharge Prescriptions    No medications on file       No discharge procedures on file      PDMP Review       Value Time User    PDMP Reviewed  Yes 1/6/2023  3:02 AM Abelardo Jimenez MD          ED Provider  Electronically Signed by           Araceli Mathew MD  01/26/23 2618

## 2023-01-26 NOTE — ASSESSMENT & PLAN NOTE
· Noted on admission labs  · Status post 36 M EQ oral and 20 M EQ IV  · We will give an additional 36 M EQ of oral supplement  · Monitor on telemetry  · Repeat BMP later today

## 2023-01-26 NOTE — ED NOTES
Patient states pain is returning slowly but declines pain meds at this time     Ples Gabriele, [address] [facility]  01/26/23 8246 no

## 2023-01-26 NOTE — ED NOTES
[facility]     Bryan Burnham RN  01/26/23 9708

## 2023-01-26 NOTE — CONSULTS
Consult - Urology   Robert Burton 1965, 56 y o  female MRN: [id number]    Unit/Bed#: ED-21 Encounter: [id number]        Assessment & Plan:  1  R flank pain  2  Bilateral hydronephrosis  - CT showing moderate right hydronephrosis with right nephroureteral stent in place similar to prior study  Post lithotripsy stone fragments seen  Improved left hydronephrosis with left nephroureteral stent in place  Stable proximal left ureteral calculus present  Mild progressive right renal pyelitis in left renal lower pole calyceal hyperemia possible residual inflammation from recent procedure  - VSS, afebrile  -No leukocytosis  -No THOR  - Abdomen soft, nondistended nontender  No CVA tenderness   -Patient likely having stent discomfort due to recent procedure  -UA no bacteria, occasional mucus threads  Small leukocytes, nitrate negative  UA likely positive due to stents in place  urine culture pending    -Patient reports no urinary symptoms  She reports for the past 6 weeks she has had urinary symptoms, however denies any admission  -No acute  surgical intervention required  -Continue pain control  -Urology will continue to follow  Subjective:   Pt is a 55 yo female with PMH colon cancer s/p partial colectomy and chemotherapy, sarcoidosis who presented to ED with R flank pain  Patient has presented to the ED multiple times due to flank pain, UTI symptoms  She previously had indwelling stents from Urologist in [facility] since July  Pt seen by our office and recently had cystoscopy right ureteroscopy with lithotripsy holmium laser, bilateral retrograde pyelogram and bilateral ureteral stent exchanges on 1/23/2023  Outpatient plan for repeat ureteroscopy on left side in near future  Patient seen at bedside reporting severe right-sided flank pain  She also reports generalized abdominal pain  She denies any urinary symptoms at this time  She reports mild hematuria, to be expected with stent    She reports her right-sided flank pain is much worse than prior to her procedure  She denies any chest pain, shortness of breath, nausea, vomiting, fever, chills  Review of Systems   Constitutional: Negative for chills, diaphoresis and fever  Respiratory: Negative for cough and shortness of breath  Cardiovascular: Negative for chest pain and palpitations  Gastrointestinal: Positive for abdominal pain  Negative for vomiting  Genitourinary: Positive for flank pain and hematuria  Negative for dysuria  Musculoskeletal: Negative for arthralgias and back pain  Skin: Negative for color change and rash  Neurological: Negative for seizures and syncope  All other systems reviewed and are negative  Objective:  Vitals: Blood pressure 127/59, pulse 95, temperature 99 1 °F (37 3 °C), temperature source Oral, resp  rate 18, SpO2 99 %  ,There is no height or weight on file to calculate BMI  Physical Exam  Vitals reviewed  Constitutional:       General: She is not in acute distress  Appearance: Normal appearance  She is normal weight  She is ill-appearing  She is not toxic-appearing or diaphoretic  HENT:      Head: Normocephalic and atraumatic  Right Ear: External ear normal       Left Ear: External ear normal       Nose: Nose normal       Mouth/Throat:      Mouth: Mucous membranes are moist    Eyes:      General: No scleral icterus  Conjunctiva/sclera: Conjunctivae normal    Cardiovascular:      Rate and Rhythm: Normal rate  Pulses: Normal pulses  Pulmonary:      Effort: Pulmonary effort is normal    Abdominal:      Comments: Abdomen soft nontender nondistended  Colostomy in place  No CVA tenderness   Musculoskeletal:         General: Normal range of motion  Cervical back: Normal range of motion  Skin:     General: Skin is warm  Findings: No rash  Neurological:      General: No focal deficit present  Mental Status: She is alert and oriented to person, place, and time   Mental status is at baseline  Psychiatric:         Mood and Affect: Mood normal          Behavior: Behavior normal          Thought Content: Thought content normal          Judgment: Judgment normal        Imaging:    CT ABDOMEN AND PELVIS WITH IV CONTRAST     INDICATION:   Abdominal pain, acute, nonlocalized  left sided abdominal/flank pain      COMPARISON:  CT abdomen and pelvis 1/6/2023     TECHNIQUE:  CT examination of the abdomen and pelvis was performed  In addition to portal venous phase postcontrast scanning through the abdomen and pelvis, delayed phase postcontrast scanning was performed through the upper abdominal viscera  Axial,   sagittal, and coronal 2D reformatted images were created from the source data and submitted for interpretation      Radiation dose length product (DLP) for this visit:  638 mGy-cm   This examination, like all CT scans performed in the [facility], was performed utilizing techniques to minimize radiation dose exposure, including the use of iterative   reconstruction and automated exposure control      IV Contrast:  100 mL of iohexol (OMNIPAQUE)  350  Enteric Contrast:  Enteric contrast was not administered      FINDINGS:     ABDOMEN     LOWER CHEST:  Minimal left lower lobe juxtapleural platelike scarring  Tiny sliding hiatal hernia  Stable 6 mm inferior left breast superficial nodule image 22 series 301      LIVER/BILIARY TREE:  Liver is diffusely decreased in density consistent with fatty change  No CT evidence of suspicious hepatic mass  Normal hepatic contours  No biliary dilatation      GALLBLADDER:  No calcified gallstones  Trace gallbladder wall thickening again visualized      SPLEEN:  Unremarkable      PANCREAS:  Unremarkable      ADRENAL GLANDS:  Stable 1 cm nodule in the lateral limb of the right adrenal gland   Unremarkable left adrenal gland      KIDNEYS/URETERS:  Bilateral nephroureteral stents in place with proximal right pigtail in the renal pelvis and proximal left pigtail in the upper pole calyx      Moderate right hydronephrosis similar to prior study  Minimal right proximal ureteral calcification beginning at the UPJ extending over a length of 2 5 cm; decreased calcification since the prior study  New right renal lower pole calculi, 1 3 cm and   smaller  Minimal right renal pelvic urothelial thickening and hyperemia has increased with new trace parapelvic stranding  No perinephric collection      Trace residual albeit markedly improved left hydronephrosis  Minimal fat stranding adjacent to the left renal pelvis and proximal ureter with decreased urothelial hyperemia  Trace new renal lower pole calyceal hyperemia likely sequela of recent   procedure  Stable calcifications in the proximal left ureter, 3 mm and smaller prior punctate calcification in the distal ureter is no longer present  Left renal calculi, 4 mm and smaller  No perinephric collection      STOMACH AND BOWEL:  Left ventral mid abdominal colostomy  Nondistended small bowel and colon limits the evaluation  No bowel obstruction      APPENDIX:  No findings to suggest appendicitis      ABDOMINOPELVIC CAVITY:  No ascites  No pneumoperitoneum  No lymphadenopathy      VESSELS:  Aortoiliac calcification  No aneurysm      PELVIS     REPRODUCTIVE ORGANS:  Unremarkable for patient's age      URINARY BLADDER:  Distal pigtails of bilateral nephroureteral stents in the bladder lumen  Trace gas in the bladder lumen attributed to recent procedure      ABDOMINAL WALL/INGUINAL REGIONS:  Small fat-containing umbilical hernia  Tiny fat-containing right inguinal hernia     OSSEOUS STRUCTURES:  No acute fracture or osseous destructive lesion identified  Degenerative changes of the spine  Transitional lumbosacral vertebra attributed to sacralized L5 vertebral body      IMPRESSION:     Moderate right hydronephrosis with right nephroureteral stent in place similar to prior study   Proximal right ureteral calcification has decreased with new right renal lower pole stone fragments, 1 3 cm and smaller attributed to post lithotripsy   retrograde migration      Trace residual albeit markedly improved left hydronephrosis with left nephroureteral stent in place  Stable proximal left ureteral calculi, 3 mm and smaller  Punctate distal left ureteral calculus present previously is no longer visualized  Stable left   renal calculi, 4 mm and smaller      Mildly progressive right renal pyelitis in the left renal lower pole calyceal hyperemia may be residual inflammation from recent procedure  Correlate with UA to exclude UTI  No perinephric collection      Stable minimal gallbladder wall thickening without calcified gallstones  This may be sequela of chronic hepatocellular disease, partially contracted gallbladder or nonspecific chronic inflammation  This could be followed up with nonemergent gallbladder   ultrasound      Stable 1 cm right adrenal nodule  Although its imaging features are indeterminate, it does not have suspicious imaging features (heterogeneity, necrosis, irregular margins), therefore this is likely benign, and can be followed by non-contrast abdomen CT   or MRI in 12 months    If patient has history of adrenal hyperfunction, consider biochemical evaluation       Adrenal recommendation based on institutional consensus and Journal of [address] [place] [address] of Radiology [id number]     This study demonstrates a significant  finding and was documented as such in [facility] for liaison and referring practitioner notification      This study demonstrates a significant  finding and was documented as such in [address] [place] [address] Rd for liaison and referring practitioner notification                      Workstation performed: [id number]       Labs:  Recent Labs     01/26/23  0823   WBC 6 48     Recent Labs     01/26/23  0823   HGB 10 3*     Recent Labs     01/26/23  0823   CREATININE 0 68         History:  Social History     Socioeconomic History   • Marital status: Single     Spouse name: None   • Number of children: None   • Years of education: None   • Highest education level: None   Occupational History   • None   Tobacco Use   • Smoking status: Never     Passive exposure: Past   • Smokeless tobacco: Never   Vaping Use   • Vaping Use: Never used   Substance and Sexual Activity   • Alcohol use: Never   • Drug use: Never   • Sexual activity: None   Other Topics Concern   • None   Social History Narrative   • None     Social Determinants of Health     Financial Resource Strain: Not on file   Food Insecurity: Not on file   Transportation Needs: Not on file   Physical Activity: Not on file   Stress: Not on file   Social Connections: Not on file   Intimate Partner Violence: Not on file   Housing Stability: Not on file     Past Medical History:   Diagnosis Date   • Colon cancer ([facility] [address] )    • Fall    • Headache    • Hemochromatosis, unspecified    • History of transfusion     2022 - no adverse reaction   • Kidney calculi    • Kidney stone    • Liver disease     hemangioma   • Muscle weakness    • Sarcoidosis    • Seizures ([facility] [address] )     2022     Past Surgical History:   Procedure Laterality Date   • ABSCESS DRAINAGE      left breast   • CHEST TUBE INSERTION     • COLON SURGERY      colostomy   • COLONOSCOPY     • LUNG BIOPSY     • [place] CYSTO/URETERO W/LITHOTRIPSY &INDWELL STENT INSRT Bilateral 1/23/2023    Procedure: CYSTOSCOPY  RIGHT URETEROSCOPY WITH LITHOTRIPSY HOLMIUM LASER, BILATERAL RETROGRADE PYELOGRAM AND BILATERAL  URETERAL STENT INSERTION;  Surgeon: Cas Olivas MD;  [facility];  Service: Urology   • TONSILLECTOMY     • URINARY SURGERY Bilateral     bilateral stents     History reviewed  No pertinent family history      Georges Patel PA-C  Date: 1/26/2023 Time: 3:34 PM

## 2023-01-27 VITALS
OXYGEN SATURATION: 97 % | DIASTOLIC BLOOD PRESSURE: 77 MMHG | SYSTOLIC BLOOD PRESSURE: 137 MMHG | BODY MASS INDEX: 25.58 KG/M2 | RESPIRATION RATE: 16 BRPM | TEMPERATURE: 98.7 F | HEIGHT: 62 IN | WEIGHT: 139 LBS | HEART RATE: 79 BPM

## 2023-01-27 LAB
ANION GAP SERPL CALCULATED.3IONS-SCNC: 6 MMOL/L (ref 4–13)
BACTERIA UR CULT: NORMAL
BASOPHILS # BLD AUTO: 0.05 THOUSANDS/ÂΜL (ref 0–0.1)
BASOPHILS NFR BLD AUTO: 1 % (ref 0–1)
BUN SERPL-MCNC: 17 MG/DL (ref 5–25)
CALCIUM SERPL-MCNC: 7.8 MG/DL (ref 8.4–10.2)
CHLORIDE SERPL-SCNC: 109 MMOL/L (ref 96–108)
CO2 SERPL-SCNC: 22 MMOL/L (ref 21–32)
CREAT SERPL-MCNC: 0.68 MG/DL (ref 0.6–1.3)
EOSINOPHIL # BLD AUTO: 0.05 THOUSAND/ÂΜL (ref 0–0.61)
EOSINOPHIL NFR BLD AUTO: 1 % (ref 0–6)
ERYTHROCYTE [DISTWIDTH] IN BLOOD BY AUTOMATED COUNT: 19.7 % (ref 11.6–15.1)
GFR SERPL CREATININE-BSD FRML MDRD: 98 ML/MIN/1.73SQ M
GLUCOSE SERPL-MCNC: 154 MG/DL (ref 65–140)
HCT VFR BLD AUTO: 30.9 % (ref 34.8–46.1)
HGB BLD-MCNC: 10.5 G/DL (ref 11.5–15.4)
IMM GRANULOCYTES # BLD AUTO: 0.17 THOUSAND/UL (ref 0–0.2)
IMM GRANULOCYTES NFR BLD AUTO: 3 % (ref 0–2)
LYMPHOCYTES # BLD AUTO: 0.62 THOUSANDS/ÂΜL (ref 0.6–4.47)
LYMPHOCYTES NFR BLD AUTO: 13 % (ref 14–44)
MCH RBC QN AUTO: 32.9 PG (ref 26.8–34.3)
MCHC RBC AUTO-ENTMCNC: 34 G/DL (ref 31.4–37.4)
MCV RBC AUTO: 97 FL (ref 82–98)
MONOCYTES # BLD AUTO: 0.38 THOUSAND/ÂΜL (ref 0.17–1.22)
MONOCYTES NFR BLD AUTO: 8 % (ref 4–12)
NEUTROPHILS # BLD AUTO: 3.66 THOUSANDS/ÂΜL (ref 1.85–7.62)
NEUTS SEG NFR BLD AUTO: 74 % (ref 43–75)
NRBC BLD AUTO-RTO: 2 /100 WBCS
PLATELET # BLD AUTO: 208 THOUSANDS/UL (ref 149–390)
PMV BLD AUTO: 9.3 FL (ref 8.9–12.7)
POTASSIUM SERPL-SCNC: 4.4 MMOL/L (ref 3.5–5.3)
RBC # BLD AUTO: 3.19 MILLION/UL (ref 3.81–5.12)
SODIUM SERPL-SCNC: 137 MMOL/L (ref 135–147)
WBC # BLD AUTO: 4.93 THOUSAND/UL (ref 4.31–10.16)

## 2023-01-27 RX ORDER — OXYBUTYNIN CHLORIDE 5 MG/1
5 TABLET ORAL 3 TIMES DAILY
Status: DISCONTINUED | OUTPATIENT
Start: 2023-01-27 | End: 2023-01-27 | Stop reason: HOSPADM

## 2023-01-27 RX ORDER — TAMSULOSIN HYDROCHLORIDE 0.4 MG/1
0.4 CAPSULE ORAL
Qty: 15 CAPSULE | Refills: 0 | Status: SHIPPED | OUTPATIENT
Start: 2023-01-27

## 2023-01-27 RX ORDER — OXYBUTYNIN CHLORIDE 5 MG/1
5 TABLET ORAL 3 TIMES DAILY
Qty: 15 TABLET | Refills: 0 | Status: SHIPPED | OUTPATIENT
Start: 2023-01-27 | End: 2023-02-14

## 2023-01-27 RX ADMIN — OXYCODONE HYDROCHLORIDE 10 MG: 10 TABLET ORAL at 09:04

## 2023-01-27 RX ADMIN — PREDNISONE 20 MG: 20 TABLET ORAL at 09:31

## 2023-01-27 RX ADMIN — ENOXAPARIN SODIUM 40 MG: 40 INJECTION SUBCUTANEOUS at 09:31

## 2023-01-27 RX ADMIN — OXYBUTYNIN CHLORIDE 5 MG: 5 TABLET ORAL at 17:42

## 2023-01-27 RX ADMIN — OXYBUTYNIN CHLORIDE 5 MG: 5 TABLET ORAL at 12:47

## 2023-01-27 NOTE — PLAN OF CARE
Problem: PAIN - ADULT  Goal: Verbalizes/displays adequate comfort level or baseline comfort level  Description: Interventions:  - Encourage patient to monitor pain and request assistance  - Assess pain using appropriate pain scale  - Administer analgesics based on type and severity of pain and evaluate response  - Implement non-pharmacological measures as appropriate and evaluate response  - Consider cultural and social influences on pain and pain management  - Notify physician/advanced practitioner if interventions unsuccessful or patient reports new pain  Outcome: Progressing     Problem: INFECTION - ADULT  Goal: Absence or prevention of progression during hospitalization  Description: INTERVENTIONS:  - Assess and monitor for signs and symptoms of infection  - Monitor lab/diagnostic results  - Monitor all insertion sites, i e  indwelling lines, tubes, and drains  - Monitor endotracheal if appropriate and nasal secretions for changes in amount and color  - [place] appropriate cooling/warming therapies per order  - Administer medications as ordered  - Instruct and encourage patient and family to use good hand hygiene technique  - Identify and instruct in appropriate isolation precautions for identified infection/condition  Outcome: Progressing  Goal: Absence of fever/infection during neutropenic period  Description: INTERVENTIONS:  - Monitor WBC    Outcome: Progressing     Problem: SAFETY ADULT  Goal: Patient will remain free of falls  Description: INTERVENTIONS:  - Educate patient/family on patient safety including physical limitations  - Instruct patient to call for assistance with activity   - Consult OT/PT to assist with strengthening/mobility   - Keep Call bell within reach  - Keep bed low and locked with side rails adjusted as appropriate  - Keep care items and personal belongings within reach  - Initiate and maintain comfort rounds  - Make Fall Risk Sign visible to staff  - Offer Toileting every 3 Hours, in advance of need  - Initiate/Maintain bed alarm  - Obtain necessary fall risk management equipment: yellow socks  - Apply yellow socks and bracelet for high fall risk patients  - Consider moving patient to room near nurses station  Outcome: Progressing  Goal: Maintain or return to baseline ADL function  Description: INTERVENTIONS:  -  Assess patient's ability to carry out ADLs; assess patient's baseline for ADL function and identify physical deficits which impact ability to perform ADLs (bathing, care of mouth/teeth, toileting, grooming, dressing, etc )  - Assess/evaluate cause of self-care deficits   - Assess range of motion  - Assess patient's mobility; develop plan if impaired  - Assess patient's need for assistive devices and provide as appropriate  - Encourage maximum independence but intervene and supervise when necessary  - Involve family in performance of ADLs  - Assess for home care needs following discharge   - Consider OT consult to assist with ADL evaluation and planning for discharge  - Provide patient education as appropriate  Outcome: Progressing  Goal: Maintains/Returns to pre admission functional level  Description: INTERVENTIONS:  - Perform BMAT or MOVE assessment daily    - Set and communicate daily mobility goal to care team and patient/family/caregiver  - Collaborate with rehabilitation services on mobility goals if consulted  - Perform Range of Motion 2 times a day  - Reposition patient every 2 hours    - Dangle patient 2 times a day  - Stand patient 2 times a day  - Ambulate patient 2 times a day  - Out of bed to chair 2 times a day   - Out of bed for meals 2 times a day  - Out of bed for toileting  - Record patient progress and toleration of activity level   Outcome: Progressing     Problem: DISCHARGE PLANNING  Goal: Discharge to home or other facility with appropriate resources  Description: INTERVENTIONS:  - Identify barriers to discharge w/patient and caregiver  - Arrange for needed discharge resources and transportation as appropriate  - Identify discharge learning needs (meds, wound care, etc )  - Arrange for interpretive services to assist at discharge as needed  - Refer to Case Management Department for coordinating discharge planning if the patient needs post-hospital services based on physician/advanced practitioner order or complex needs related to functional status, cognitive ability, or social support system  Outcome: Progressing     Problem: Knowledge Deficit  Goal: Patient/family/caregiver demonstrates understanding of disease process, treatment plan, medications, and discharge instructions  Description: Complete learning assessment and assess knowledge base    Interventions:  - Provide teaching at level of understanding  - Provide teaching via preferred learning methods  Outcome: Progressing

## 2023-01-27 NOTE — PROGRESS NOTES
Progress Note - Urology  Sukumar Fu 1965, 56 y o  female MRN: [id number]    Unit/Bed#: W -01 Encounter: [id number]      Assessment & Plan:  1  Right flank pain  2  Bilateral hydronephrosis  -Vital signs stable, afebrile  -No leukocytosis  -No THOR  -UA no bacteria, occasional mucus threads  Small leukocytes, nitrate negative  UA likely positive due to stents in place  Patient without urinary symptoms including dysuria, frequency, urgency  - Pt reports pain is controlled today  She reports oxybutinin is helping her with right sided pain  - Discharge home with flomax, oxybutinin, pyridium for stent discomfort  - Office will be in touch for left ureteroscopy  - Right stent to stay in place due to high risk of ureteral stricture  - Pt stable for discharge today  - Urology will sign off at this time  Please do not hesitate to reach out with any questions or concerns  Subjective:   HPI: Pt seen at bedside today  She reports that she is feeling much better today  She reports a lack of appetite  Review of Systems   Constitutional: Positive for appetite change  Negative for chills, diaphoresis and fever  Respiratory: Negative for cough and shortness of breath  Cardiovascular: Negative for chest pain and palpitations  Gastrointestinal: Positive for abdominal pain  Negative for nausea and vomiting  Genitourinary: Negative for dysuria and hematuria  Musculoskeletal: Negative for arthralgias and back pain  Skin: Negative for color change and rash  Neurological: Negative for seizures and syncope  All other systems reviewed and are negative  Objective:  Vitals: Blood pressure 135/78, pulse 93, temperature 98 6 °F (37 °C), resp  rate 16, height 5' 2" (1 575 m), weight 63 kg (139 lb), SpO2 98 %  ,Body mass index is 25 42 kg/m²  Physical Exam  Vitals reviewed  Constitutional:       General: She is not in acute distress  Appearance: Normal appearance   She is normal weight  She is ill-appearing  She is not toxic-appearing or diaphoretic  HENT:      Head: Normocephalic and atraumatic  Right Ear: External ear normal       Left Ear: External ear normal       Nose: Nose normal       Mouth/Throat:      Mouth: Mucous membranes are moist    Eyes:      General: No scleral icterus  Conjunctiva/sclera: Conjunctivae normal    Cardiovascular:      Rate and Rhythm: Normal rate  Pulses: Normal pulses  Heart sounds: Normal heart sounds  Pulmonary:      Effort: Pulmonary effort is normal  No respiratory distress  Breath sounds: No stridor  No wheezing, rhonchi or rales  Abdominal:      Tenderness: There is no abdominal tenderness  Comments: Abdomen soft, nondistended, nontender  No CVA tenderness   Musculoskeletal:         General: Normal range of motion  Cervical back: Normal range of motion  Skin:     General: Skin is warm  Findings: No rash  Neurological:      General: No focal deficit present  Mental Status: She is alert and oriented to person, place, and time  Mental status is at baseline  Psychiatric:         Mood and Affect: Mood normal          Behavior: Behavior normal          Thought Content:  Thought content normal          Judgment: Judgment normal          Labs:  Recent Labs     01/26/23  0823 01/27/23  0553   WBC 6 48 4 93     Recent Labs     01/26/23  0823 01/27/23  0553   HGB 10 3* 10 5*     Recent Labs     01/26/23  0823 01/27/23  0553   HCT 30 0* 30 9*     Recent Labs     01/26/23  0823 01/26/23  1924 01/27/23  0553   CREATININE 0 68 0 77 0 68       History:  Past Medical History:   Diagnosis Date   • Colon cancer [facility])    • Fall    • Headache    • Hemochromatosis, unspecified    • History of transfusion     2022 - no adverse reaction   • Kidney calculi    • Kidney stone    • Liver disease     hemangioma   • Muscle weakness    • Sarcoidosis    • Seizures ([facility] [address] )     2022     Social History     Socioeconomic History   • Marital status: Single     Spouse name: None   • Number of children: None   • Years of education: None   • Highest education level: None   Occupational History   • None   Tobacco Use   • Smoking status: Never     Passive exposure: Past   • Smokeless tobacco: Never   Vaping Use   • Vaping Use: Never used   Substance and Sexual Activity   • Alcohol use: Never   • Drug use: Never   • Sexual activity: None   Other Topics Concern   • None   Social History Narrative   • None     Social Determinants of Health     Financial Resource Strain: Not on file   Food Insecurity: Not on file   Transportation Needs: Not on file   Physical Activity: Not on file   Stress: Not on file   Social Connections: Not on file   Intimate Partner Violence: Not on file   Housing Stability: Not on file     Past Surgical History:   Procedure Laterality Date   • ABSCESS DRAINAGE      left breast   • CHEST TUBE INSERTION     • COLON SURGERY      colostomy   • COLONOSCOPY     • FL RETROGRADE PYELOGRAM  1/23/2023   • LUNG BIOPSY     • [place] CYSTO/URETERO W/LITHOTRIPSY &INDWELL STENT INSRT Bilateral 1/23/2023    Procedure: CYSTOSCOPY  RIGHT URETEROSCOPY WITH LITHOTRIPSY HOLMIUM LASER, BILATERAL RETROGRADE PYELOGRAM AND BILATERAL  URETERAL STENT INSERTION;  Surgeon: Chloe Zamarripa MD;  [facility];  Service: Urology   • TONSILLECTOMY     • URINARY SURGERY Bilateral     bilateral stents     History reviewed  No pertinent family history      Wally Hernandez PA-C  Date: 1/27/2023 Time: 10:17 AM

## 2023-01-27 NOTE — INCIDENTAL FINDINGS
The following findings require follow up:  Radiographic finding   Finding: Stable 1 cm right adrenal nodule  Although its imaging features are indeterminate, it does not have suspicious imaging features (heterogeneity, necrosis, irregular margins), therefore this is likely benign, and can be followed by   see below   Follow up required: non-contrast abdomen CT or MRI in 12 months       Follow up should be done within 12 month(s)    Please notify the following clinician to assist with the follow up:   Primary care physician

## 2023-01-27 NOTE — DISCHARGE INSTR - AVS FIRST PAGE
Dear Paresh Owusu,     It was our pleasure to care for you here at [facility]  It is our hope that we were always able to exceed the expected standards for your care during your stay  You were hospitalized due to right flank pain  You were cared for on the [address] [place] [address] 4th floor under the service of Bryanna Sarmiento MD with the [facility] who covers for your primary care physician (PCP), No primary care provider on file  , while you were hospitalized  If you have any questions or concerns related to this hospitalization, you may contact us at [phone]  For follow up as well as medication refills, we recommend that you follow up with your primary care physician  A registered nurse will reach out to you by phone within a few days after your discharge to answer any additional questions that you may have after going home  However, at this time we provide for you here, the most important instructions / recommendations at discharge:       Notable Medication Adjustments -   None    Testing Required after Discharge -    Non-contrast abdomen CT or MRI in 12 months to follow-up on adrenal nodule  You can discuss with your primary care physician about this    Important follow up information -   Follow-up with your primary care physician in 1 week  Follow-up with your urologist in 1 week or as prior instructed    Please review this entire after visit summary as additional general instructions including medication list, appointments, activity, diet, any pertinent wound care, and other additional recommendations from your care team that may be provided for you        Sincerely,     Bryanna Sarmiento MD

## 2023-01-27 NOTE — DISCHARGE SUMMARY
Discharge Summary - [facility]    Patient Information: [place] 56 y o  female MRN: [id number]  Unit/Bed#: W -01 Encounter: [id number]    Discharging Physician / Practitioner: Sophia Roberson MD  PCP: No primary care provider on file  Admission Date: 1/26/2023  Discharge Date: 01/27/23    Reason for Admission: Right flank pain    Discharge Diagnoses:     Principal Problem:    R flank pain with bilateral hydronephrosis  Active Problems:    Malignant neoplasm of sigmoid colon ([address] )    C  difficile colitis    Sarcoidosis    Hypokalemia    Consultations During Hospital Stay:  · Urology    Procedures Performed:   · None    Significant findings:  · CT abdomen/pelvis  Moderate right hydronephrosis with right nephroureteral stent in place similar to prior study  Proximal right ureteral calcification has decreased with new right renal lower pole stone fragments, 1 3 cm and smaller attributed to post lithotripsy retrograde migration      Trace residual albeit markedly improved left hydronephrosis with left nephroureteral stent in place  Stable proximal left ureteral calculi, 3 mm and smaller  Punctate distal left ureteral calculus present previously is no longer visualized  Stable left   renal calculi, 4 mm and smaller      Mildly progressive right renal pyelitis in the left renal lower pole calyceal hyperemia may be residual inflammation from recent procedure  Correlate with UA to exclude UTI  No perinephric collection      Stable minimal gallbladder wall thickening without calcified gallstones  This may be sequela of chronic hepatocellular disease, partially contracted gallbladder or nonspecific chronic inflammation  This could be followed up with nonemergent gallbladder   ultrasound      Stable 1 cm right adrenal nodule   Although its imaging features are indeterminate, it does not have suspicious imaging features (heterogeneity, necrosis, irregular margins), therefore this is likely benign, and can be followed by non-contrast abdomen CT   or MRI in 12 months  If patient has history of adrenal hyperfunction, consider biochemical evaluation  Hospital Course:   Pedro Sanchez is a 56 y o  female patient who originally presented to the hospital on 1/26/2023 due to right flank pain  Patient has history of bilateral ureteral stents that were encrusted and had recent right-sided lithotripsy with bilateral ureteral stent placement on 1/23/2023  She presented with right flank pain and CT findings as above  She was conservatively managed with pain control  She was seen by urology and no urologic interventions recommended  By the day following presentation, pain was improved  Patient was recommended to continue with oxybutynin at home  She remained stable and cleared for discharge on 1/27/2023  She is instructed to follow-up with urologist in the office  Incidentally noted is a 1 cm right adrenal nodule as indicated above  Patient to follow with her primary care physician for additional imaging studies in 12 months  Condition at Discharge: stable     Discharge Day Visit / Exam:     Subjective: Feels better this morning  Denies any nausea or vomiting  Right flank pain is improved  No fever or chills, no urinary symptoms      Vitals: Blood Pressure: 135/78 (01/27/23 0817)  Pulse: 93 (01/27/23 0817)  Temperature: 98 6 °F (37 °C) (01/27/23 0817)  Temp Source: Oral (01/26/23 1520)  Respirations: 16 (01/27/23 0817)  Height: 5' 2" (157 5 cm) (01/26/23 1731)  Weight - Scale: 63 kg (139 lb) (01/26/23 1731)  SpO2: 98 % (01/27/23 0817)    General Appearance:    Alert, cooperative, no distress, appropriately responsive    Head:    Normocephalic, mucous membranes moist   Eyes:    Conjunctiva/corneas clear   Neck:   Supple   Lungs:     Clear to auscultation bilaterally, respirations unlabored, no crackles or wheeze     Heart:    Regular rate and rhythm, S1 and S2    Abdomen:     Soft, mild right flank tenderness, no CVA tenderness   Extremities:   Extremities normal, atraumatic, no cyanosis or edema   Neurologic:  nonfocal      Discharge instructions/Information to patient and family:   See after visit summary for information provided to patient and family  Provisions for Follow-Up Care:  See after visit summary for information related to follow-up care and any pertinent home health orders  Disposition: Home    Discharge Statement:  I spent >30 minutes discharging the patient  This time was spent on the day of discharge  I had direct contact with the patient on the day of discharge  Greater than 50% of the total time was spent examining patient, answering all patient questions, arranging and discussing plan of care with patient as well as directly providing post-discharge instructions  Additional time then spent on discharge activities  Discharge Medications:  See after visit summary for reconciled discharge medications provided to patient and family  ** Please Note: Dragon 360 Dictation voice to text software may have been used in the creation of this document   **

## 2023-01-27 NOTE — UTILIZATION REVIEW
Initial Clinical Review    Admission: Date/Time/Statement:   Admission Orders (From admission, onward)     Ordered        01/26/23 1130  INPATIENT ADMISSION  Once                      Orders Placed This Encounter   Procedures   • INPATIENT ADMISSION     Standing Status:   Standing     Number of Occurrences:   1     Order Specific Question:   Level of Care     Answer:   Med Surg [16]     Order Specific Question:   Estimated length of stay     Answer:   More than 2 Midnights     Order Specific Question:   Certification     Answer:   I certify that inpatient services are medically necessary for this patient for a duration of greater than two midnights  See H&P and MD Progress Notes for additional information about the patient's course of treatment  ED Arrival Information     Expected   -    Arrival   1/26/2023 07:50    Acuity   Urgent            Means of arrival   Walk-In    Escorted by   Self    Service   Hospitalist    Admission type   Emergency            Arrival complaint   Back Pain/post kidney stone surgery           Chief Complaint   Patient presents with   • Flank Pain     Pt c/o R flank pain  States on Monday she had a lithotripsy for a stone          Initial Presentation: 56 y o  female pmh colon cancer status post partial colectomy and chemotherapy, sarcoidosis on prednisone, recent admit 4 days prior w bilateral hydronephrosis secondary to encrusted ureteral stents s/p anitbx w Ureteral stent exchange 1/23 presents self to ED w ABD pain,  worsening R flank pain w nausea & vomiting  EXAM  UA w leuks & RBC no bacteria; CT scan shows Moderate right hydronephrosis similar to prior study and mild progressive renal pyelitis; left sided hydro  improved, labs w hypokalemia    Inpatient admission w R flank pain w bilateral hydronephrosis, hypokalemia  Consult Urology, req x2 IV  Pain narc  & x1  IV antiemetic control; replete & monitor potassium    Urology: likely having stent discomfort due to recent procedure  UA likely positive due to stents in place  urine culture pending  reports no urinary symptoms - No acute  surgical intervention required  Continue pain control    Date: 1/27/2023   Day 2:   Attending MD  conservatively managed with pain control  no urologic interventions recommended  By the day following presentation, pain  improved  Patient was recommended to continue with oxybutynin at home  She remained stable and cleared for discharge on 1/27/2023  She is instructed to follow-up with urologist in the office    Incidentally noted is a 1 cm right adrenal nodule for OP PCP follow up  ED Triage Vitals [01/26/23 0757]   Temperature Pulse Respirations Blood Pressure SpO2   97 6 °F (36 4 °C) 89 16 156/85 98 %      Temp Source Heart Rate Source Patient Position - Orthostatic VS BP Location FiO2 (%)   Oral Monitor Sitting Right arm --      Pain Score       8          Wt Readings from Last 1 Encounters:   01/26/23 63 kg (139 lb)     Additional Vital Signs:   Date/Time Temp Pulse Resp BP MAP (mmHg) SpO2 O2 Device Patient Position - Orthostatic VS   01/27/23 08:17:19 98 6 °F (37 °C) 93 16 135/78 97 98 % -- --   01/26/23 22:08:37 99 9 °F (37 7 °C) 94 18 117/61 80 97 % -- --   01/26/23 17:31:53 98 3 °F (36 8 °C) 105 16 117/70 86 98 % -- --   01/26/23 1520 99 1 °F (37 3 °C) 95 18 127/59 84 99 % None (Room air) Lying   01/26/23 1315 -- 90 18 140/88 99 100 % None (Room air) Sitting   01/26/23 1046 -- 82 18 140/67 -- 100 % None (Room air) Sitting   01/26/23 0927 -- 89 18 166/78 -- 99 % None (Room air) Lying   01/26/23 0827 -- 84 18 156/85 -- 99 % None (Room air) Lying   01/26/23 0757 97 6 °F (36 4 °C) 89 16 156/85 -- 98 % None (Room air) Sitting     Weights (last 14 days)    Date/Time Weight Weight Method Height   01/26/23 17:31:53 63 kg (139 lb) Stated 5' 2" (1 575 m)       Pertinent Labs/Diagnostic Test Results:   CT abdomen pelvis with contrast   Final Result by Claudia Be MD (01/26 7739) Moderate right hydronephrosis with right nephroureteral stent in place similar to prior study  Proximal right ureteral calcification has decreased with new right renal lower pole stone fragments, 1 3 cm and smaller attributed to post lithotripsy    retrograde migration  Trace residual albeit markedly improved left hydronephrosis with left nephroureteral stent in place  Stable proximal left ureteral calculi, 3 mm and smaller  Punctate distal left ureteral calculus present previously is no longer visualized  Stable left    renal calculi, 4 mm and smaller  Mildly progressive right renal pyelitis in the left renal lower pole calyceal hyperemia may be residual inflammation from recent procedure  Correlate with UA to exclude UTI  No perinephric collection  Stable minimal gallbladder wall thickening without calcified gallstones  This may be sequela of chronic hepatocellular disease, partially contracted gallbladder or nonspecific chronic inflammation  This could be followed up with nonemergent gallbladder    ultrasound  Stable 1 cm right adrenal nodule  Although its imaging features are indeterminate, it does not have suspicious imaging features (heterogeneity, necrosis, irregular margins), therefore this is likely benign, and can be followed by non-contrast abdomen CT    or MRI in 12 months  If patient has history of adrenal hyperfunction, consider biochemical evaluation         Adrenal recommendation based on institutional consensus and Journal of [employer] of Radiology 9292;68:[id number]            Results from last 7 days   Lab Units 01/27/23  0553 01/26/23  1924 01/26/23  0823   WBC Thousand/uL 4 93  --  6 48   HEMOGLOBIN g/dL 10 5*  --  10 3*   HEMATOCRIT % 30 9*  --  30 0*   PLATELETS Thousands/uL 208 206 297   NEUTROS ABS Thousands/µL 3 66  --   --    BANDS PCT %  --   --  2         Results from last 7 days   Lab Units 01/27/23  0553 01/26/23  1924 01/26/23  0823   SODIUM mmol/L 137 136 137   POTASSIUM mmol/L 4 4 2 9* 2 6*   CHLORIDE mmol/L 109* 107 103   CO2 mmol/L 22 25 26   ANION GAP mmol/L 6 4 8   BUN mg/dL 17 19 21   CREATININE mg/dL 0 68 0 77 0 68   EGFR ml/min/1 73sq m 98 87 98   CALCIUM mg/dL 7 8* 7 8* 8 5     Results from last 7 days   Lab Units 01/26/23  0823   AST U/L 21   ALT U/L 24   ALK PHOS U/L 147*   TOTAL PROTEIN g/dL 5 4*   ALBUMIN g/dL 3 2*   TOTAL BILIRUBIN mg/dL 2 01*         Results from last 7 days   Lab Units 01/27/23  0553 01/26/23  1924 01/26/23  0823   GLUCOSE RANDOM mg/dL 154* 150* 114       Results from last 7 days   Lab Units 01/26/23  1043   CLARITY UA  Clear   COLOR UA  Light Yellow   SPEC GRAV UA  >=1 050*   PH UA  7 5   GLUCOSE UA mg/dl Negative   KETONES UA mg/dl Negative   BLOOD UA  Moderate*   PROTEIN UA mg/dl Trace*   NITRITE UA  Negative   BILIRUBIN UA  Negative   UROBILINOGEN UA (BE) mg/dl <2 0   LEUKOCYTES UA  Small*   WBC UA /hpf 30-50*   RBC UA /hpf 30-50*   BACTERIA UA /hpf None Seen   EPITHELIAL CELLS WET PREP /hpf Occasional   MUCUS THREADS  Occasional*                                 Results from last 7 days   Lab Units 01/26/23  1043   URINE CULTURE  No Growth <1000 cfu/mL       ED Treatment:   Medication Administration from 01/26/2023 0750 to 01/26/2023 1722       Date/Time Order Dose Route Action     01/26/2023 0968 EST HYDROmorphone (DILAUDID) injection 1 mg 1 mg Intravenous Given     01/26/2023 4909 EST ketorolac (TORADOL) injection 15 mg 15 mg Intravenous Given     01/26/2023 1003 EST potassium chloride (K-DUR,KLOR-CON) CR tablet 40 mEq 40 mEq Oral Given     01/26/2023 1036 EST potassium chloride 20 mEq IVPB (premix) 20 mEq Intravenous New Bag     01/26/2023 1044 EST ondansetron (ZOFRAN) injection 4 mg 4 mg Intravenous Given        Past Medical History:   Diagnosis Date   • Colon cancer ([facility] [address] )    • Fall    • Headache    • Hemochromatosis, unspecified    • History of transfusion     2022 - no adverse reaction   • Kidney calculi    • Kidney stone • Liver disease     hemangioma   • Muscle weakness    • Sarcoidosis    • Seizures ([address] )     2022     Present on Admission:  • Hypokalemia  • C  difficile colitis  • R flank pain with bilateral hydronephrosis  • Malignant neoplasm of sigmoid colon (H[facility])  • Sarcoidosis      Admitting Diagnosis: Hydronephrosis [N13 30]  Hypokalemia [E87 6]  Back pain [M54 9]  Renal stone [N20 0]  Right flank pain [R10 9]  Abnormal LFTs [R79 89]  Age/Sex: 56 y o  female  Admission Orders:  Straight cath for urine once  SCD    Scheduled Medications:  enoxaparin, 40 mg, Subcutaneous, Daily  HYDROmorphone, 0 5 mg, Intravenous, Once  IV=  ketorolac (TORADOL) injection 15 mg 1/26 x1  Dose: 15 mg  Freq: Once Route: IV    IV= ondansetron (ZOFRAN) injection 4 mg 1/26 x1  Dose: 4 mg  Freq: Once Route: IV    oxybutynin, 5 mg, Oral, TID  predniSONE, 10 mg, Oral, HS  predniSONE, 20 mg, Oral, Daily  tamsulosin, 0 4 mg, Oral, HS      Continuous IV Infusions:     PRN Meds:  acetaminophen, 650 mg, Oral, Q6H PRN  calcium carbonate, 500 mg, Oral, TID PRN  HYDROmorphone, 0 5 mg, Intravenous, Q6H PRN  ondansetron, 4 mg, Intravenous, Q6H PRN  oxyCODONE, 10 mg, Oral, Q6H PRN    1/27 x1  oxyCODONE, 5 mg, Oral, Q6H PRN    IP CONSULT TO UROLOGY    Network Utilization Review Department  ATTENTION: Please call with any questions or concerns to [phone] and carefully listen to the prompts so that you are directed to the right person  All voicemails are confidential   Neldon Brain all requests for admission clinical reviews, approved or denied determinations and any other requests to dedicated fax number below belonging to the campus where the patient is receiving treatment   List of dedicated fax numbers for the Facilities:  [address] DENIALS (Administrative/Medical Necessity) [phone]   [address] [place] [address] (Maternity/NICU/Pediatrics) Cayla Linares [address] [place] [address] [place] [phone] [address] [place] [address] Rol[place] [id number] [place] [address] [address] [facility] 310 [facility] [place] [address] [phone]

## 2023-01-28 NOTE — PROGRESS NOTES
Patient discharge instructions given and explained to patient  Verbalized understanding of discharge instructions  Informed of follow up appointments and any changes in medication

## 2023-01-30 ENCOUNTER — PREP FOR PROCEDURE (OUTPATIENT)
Dept: UROLOGY | Facility: CLINIC | Age: 58
End: 2023-01-30

## 2023-01-30 DIAGNOSIS — N20.1 URETERAL STONE: Primary | ICD-10-CM

## 2023-02-06 NOTE — UTILIZATION REVIEW
URGENT/EMERGENT  INPATIENT/SPU AUTHORIZATION REQUEST    Date: 02/06/23            # Pages in this Request:     X New Request   Additional Information for PA#:     Office Contact Name:  Sai Youssef Title: Utilization Review, [place] Nurse     Phone: [phone]  Ext  Availability (Date/Time): Wednesday - Friday 8 am- 4 pm    x Inpatient Review  SPU Review        Current       x Late Pick-up   · How your facility was first notified of the Late Pick-up: Paths Letter    · When your facility was first notified of the Late Pick-up (date): 2/1/2023         RECIPIENT INFORMATION    Recipient ID#: [id number]   Recipient Name: Lori Santoyo      YOB: 1965  56 y o  Recipient Alias:     Gender:   Male X Female Medicaid Eligibility ([address] [place] [address]): INSURANCE INFORMATION    (All other private or governmental health insurance benefits must be utilized prior to billing the [place] Program)    Was this admission the result of an MVA, other accident, assault, injury, fall, gunshot, bite etc ? Yes x No                   If yes, provide a brief description of the incident  Does the recipient have other insurance coverage? Yes x No        Insurance Company Name/Policy #      Did that insurance pay on this claim? Yes  No        Did that insurance deny this claim? Yes  No    If yes, reason for denial:      Does the recipient have Medicare? Yes x No        Did Medicare exhaust prior to this admission? Yes  No        Did Medicare partially pay this claim? Yes  No        Did that insurance deny this claim? Yes  No    If yes, reason for denial:          Was the recipient a prisoner at the time of admission?   Yes x No            PROVIDER INFORMATION    Hospital Name: [address] [place] [address] Provider ID#: [id number]    [address] [place] [address] Physician Name: Fina Chicas Provider ID#: [id number]        ADMISSION INFORMATION    Type of Admission: (please choose one)    X ED      Direct    If yes, from where?     Transfer    If yes, transferring hospital (inpatient, rehab, or psych) Provider Name/Provider ID#: Admission Floor or Unit Type: Med Surg     Dates/Times:        ED Date/Time: 1/26/2023  7:55 AM        Observation Date/Time:         Admission Date/Time: 1/26/23 11:30 AM        Discharge or Transfer Date/Time: 1/27/2023  7:34 PM        DIAGNOSIS/PROCEDURE CODES    Primary Diagnosis Code/Primary Diagnosis Code description:  N13 30 Unspecified hydronephrosis   E87 6 Hypokalemia   M54 9 Dorsalgia, unspecified   N20 0 Calculus of kidney     Additional Diagnosis Code(s) and Description(s)-(up to three additional codes):    Procedure Code (one) and description:        CLINICAL INFORMATION - PRIOR ADMISSION ONLY    Is there a prior admission with a discharge date within 30 days of the date of this admission? No (Proceed to the next section - "Clinical Information - General Review Checklist:)     X Yes (Provide the following information)     Prior admission dates: 1/6/2023 -  1/7/2023    MA Prior Authorization Number: Pending         Review Outcome:  Review sent on 2/6/2023    Diagnosis Code(s)/Description:  T83 192A   Other mechanical complication of indwelling ureteral stent, initial encounter   N13 30 Unspecified hydronephrosis   N20 2 Calculus of kidney with calculus of ureter   D35 01 Benign neoplasm of right adrenal gland     Procedure Code/Description:    Findings:Hospital Course:   Mann Dickey is a 56 y o  female patient who originally presented to the hospital on 1/6/2023 due to painful urination  Patient has a very complicated urologic history including stents placed in [place] which apparently were never removed  Patient was initially placed on empiric ceftriaxone for concern of urinary tract infection and given supportive measures including Pyridium  However her urine culture actually came back negative    Nonetheless she will receive a 3-day course of antibiotics for completeness and was discharged with Pyridium  Given her recent history of C  difficile colitis she was also given a few more days of empiric vancomycin  She is improved at this time and will follow-up with our urology team locally she just recently moved to this area       Treatment:    Condition on Discharge:   Vitals: Blood Pressure: 146/76 (01/07/23 0721)  Pulse: 101 (01/06/23 2145)  Temperature: 97 8 °F (36 6 °C) (01/07/23 0721)  Temp Source: Oral (01/06/23 1437)  Respirations: 18 (01/06/23 2215)  Height: 5' 1" (154 9 cm) (01/06/23 0536)  Weight - Scale: 63 2 kg (139 lb 5 3 oz) (01/06/23 1437)  SpO2: 99 % (01/07/23 0800)   Labs:   Imaging:   Medications:     Follow-up Instructions:    Disposition:   Home         CLINICAL INFORMATION - GENERAL REVIEW CHECKLIST    EMERGENCY DEPARTMENT: (Proceed to "ADMISSION" if Direct Admission)    Presenting Signs/Symptoms:  56 y o  female pmh colon cancer status post partial colectomy and chemotherapy, sarcoidosis on prednisone, recent  Outpatient procedure  4 days prior w bilateral hydronephrosis secondary to encrusted ureteral stents s/p anitbx w Ureteral stent exchange 1/23 presents self to ED w ABD pain,  worsening R flank pain w nausea & vomiting  EXAM  UA w leuks & RBC no bacteria; CT scan shows Moderate right hydronephrosis similar to prior study and mild progressive renal pyelitis; left sided hydro  improved, labs w hypokalemia     Inpatient admission w R flank pain w bilateral hydronephrosis, hypokalemia  Consult Urology, req x2 IV  Pain narc  & x1  IV antiemetic control; replete & monitor potassium    Medication/treatment prior to arrival in the ED:    Past Medical History:    Clinical Exam:    Initial Vital Signs: (Temp, Pulse, Resp, and BP)   ED Triage Vitals [01/26/23 0757]   Temperature Pulse Respirations Blood Pressure SpO2   97 6 °F (36 4 °C) 89 16 156/85 98 %      Temp Source Heart Rate Source Patient Position - Orthostatic VS BP Location FiO2 (%)   Oral Monitor Sitting Right arm --      Pain Score       8           Pertinent Repeat Vital Signs: (include times they were obtained)  Date/Time Temp Pulse Resp BP MAP (mmHg) SpO2 O2 Device Patient Position - Orthostatic VS   01/27/23 08:17:19 98 6 °F (37 °C) 93 16 135/78 97 98 % -- --   01/26/23 22:08:37 99 9 °F (37 7 °C) 94 18 117/61 80 97 % -- --   01/26/23 17:31:53 98 3 °F (36 8 °C) 105 16 117/70 86 98 % -- --   01/26/23 1520 99 1 °F (37 3 °C) 95 18 127/59 84 99 % None (Room air) Lying   01/26/23 1315 -- 90 18 140/88 99 100 % None (Room air) Sitting   01/26/23 1046 -- 82 18 140/67 -- 100 % None (Room air) Sitting   01/26/23 0927 -- 89 18 166/78 -- 99 % None (Room air) Lying   01/26/23 0827 -- 84 18 156/85 -- 99 % None (Room air) Lying   01/26/23 0757 97 6 °F (36 4 °C) 89 16 156/85 -- 98 % None (Room air) Sitting       Pertinent Sustained Findings: (include times they were obtained)    Weight in Kilograms:  Wt Readings from Last 1 Encounters:   01/26/23 63 kg (139 lb)       Pertinent Labs (results):  Results from last 7 days   Lab Units 01/27/23  0553 01/26/23 1924 01/26/23  0823   WBC Thousand/uL 4 93  --  6 48   HEMOGLOBIN g/dL 10 5*  --  10 3*   HEMATOCRIT % 30 9*  --  30 0*   PLATELETS Thousands/uL 208 206 297   NEUTROS ABS Thousands/µL 3 66  --   --    BANDS PCT %  --   --  2                 Results from last 7 days   Lab Units 01/27/23  0553 01/26/23 1924 01/26/23  0823   SODIUM mmol/L 137 136 137   POTASSIUM mmol/L 4 4 2 9* 2 6*   CHLORIDE mmol/L 109* 107 103   CO2 mmol/L 22 25 26   ANION GAP mmol/L 6 4 8   BUN mg/dL 17 19 21   CREATININE mg/dL 0 68 0 77 0 68   EGFR ml/min/1 73sq m 98 87 98   CALCIUM mg/dL 7 8* 7 8* 8 5           Results from last 7 days   Lab Units 01/26/23  0823   AST U/L 21   ALT U/L 24   ALK PHOS U/L 147*   TOTAL PROTEIN g/dL 5 4*   ALBUMIN g/dL 3 2*   TOTAL BILIRUBIN mg/dL 2 01*                 Results from last 7 days   Lab Units 01/27/23  0553 01/26/23  1924 01/26/23  0823   GLUCOSE RANDOM mg/dL 154* 150* 114 Results from last 7 days   Lab Units 01/26/23  1043   CLARITY UA   Clear   COLOR UA   Light Yellow   SPEC GRAV UA   >=1 050*   PH UA   7 5   GLUCOSE UA mg/dl Negative   KETONES UA mg/dl Negative   BLOOD UA   Moderate*   PROTEIN UA mg/dl Trace*   NITRITE UA   Negative   BILIRUBIN UA   Negative   UROBILINOGEN UA (BE) mg/dl <2 0   LEUKOCYTES UA   Small*   WBC UA /hpf 30-50*   RBC UA /hpf 30-50*   BACTERIA UA /hpf None Seen   EPITHELIAL CELLS WET PREP /hpf Occasional   MUCUS THREADS   Occasional*               Results from last 7 days   Lab Units 01/26/23  1043   URINE CULTURE   No Growth <1000 cfu/mL      Radiology (results):  CT abdomen pelvis with contrast   Final Result by Fina Hernandes MD (01/26 8558)       Moderate right hydronephrosis with right nephroureteral stent in place similar to prior study  Proximal right ureteral calcification has decreased with new right renal lower pole stone fragments, 1 3 cm and smaller attributed to post lithotripsy    retrograde migration        Trace residual albeit markedly improved left hydronephrosis with left nephroureteral stent in place  Stable proximal left ureteral calculi, 3 mm and smaller  Punctate distal left ureteral calculus present previously is no longer visualized  Stable left    renal calculi, 4 mm and smaller        Mildly progressive right renal pyelitis in the left renal lower pole calyceal hyperemia may be residual inflammation from recent procedure  Correlate with UA to exclude UTI  No perinephric collection        Stable minimal gallbladder wall thickening without calcified gallstones  This may be sequela of chronic hepatocellular disease, partially contracted gallbladder or nonspecific chronic inflammation  This could be followed up with nonemergent gallbladder    ultrasound        Stable 1 cm right adrenal nodule   Although its imaging features are indeterminate, it does not have suspicious imaging features (heterogeneity, necrosis, irregular margins), therefore this is likely benign, and can be followed by non-contrast abdomen CT    or MRI in 12 months  If patient has history of adrenal hyperfunction, consider biochemical evaluation             EKG (results): Other tests (results):    Tests pending final results:    Treatment in the ED:   Medication Administration from 01/26/2023 0750 to 01/26/2023 1722       Date/Time Order Dose Route Action Comments     01/26/2023 6101 EST HYDROmorphone (DILAUDID) injection 1 mg 1 mg Intravenous Given --     01/26/2023 4631 EST ketorolac (TORADOL) injection 15 mg 15 mg Intravenous Given --     01/26/2023 0908 EST iohexol (OMNIPAQUE) 350 MG/ML injection (SINGLE-DOSE) 100 mL 100 mL Intravenous Given --     01/26/2023 1003 EST potassium chloride (K-DUR,KLOR-CON) CR tablet 40 mEq 40 mEq Oral Given --     01/26/2023 1036 EST potassium chloride 20 mEq IVPB (premix) 20 mEq Intravenous New Bag --     01/26/2023 1044 EST ondansetron (ZOFRAN) injection 4 mg 4 mg Intravenous Given --           Other treatments:      Change in condition while in the ED:     Response to ED Treatment:          OBSERVATION: (Proceed to "ADMISSION" if Direct Admission)    Orders written during the observation period  Meds Name, dose, route, time, how may doses given:  PRN Meds Name, dose, route, time, how many doses given within the first 24 hrs :  IVs Type, rate, and total amt  ordered/given:  Labs, imaging, other:  Consults and findings:    Test Results during the observation period  Pertinent Lab tests (dates/results):  Culture results (blood, urine, spinal, wound, respiratory, etc ):  Imaging tests (dates/results):  EKG (dates/results):   Other test (dates/results):  Tests pending (dates/results):    Surgical or Invasive Procedures during the observation period  Name of surgery/procedure:  Date & Time:  Patient Response:  Post-operative orders:  Operative Report/Findings:    Response to Treatment, Major Change in Condition, Major Charge in Treatment during the observation period          ADMISSION:    DIRECT Admissions Only:    · Presenting Signs/Symptoms:   ·   · Medication/treatment prior to arrival:  ·   · Past Medical History:  ·   · Clinical Exam on admission:  ·   · Vital Signs on admission: (Temp, Pulse, Resp, and BP)  ·   · Weight in kilograms:     ALL Admissions:    Admission Orders and Other Orders written within the first 24 hrs after admission  Meds Name, dose, route, time, how may doses given:    enoxaparin, 40 mg, Subcutaneous, Daily  HYDROmorphone, 0 5 mg, Intravenous, Once  IV=  ketorolac (TORADOL) injection 15 mg 1/26 x1  Dose: 15 mg  Freq: Once Route: IV     IV= ondansetron (ZOFRAN) injection 4 mg 1/26 x1  Dose: 4 mg  Freq: Once Route: IV     oxybutynin, 5 mg, Oral, TID  predniSONE, 10 mg, Oral, HS  predniSONE, 20 mg, Oral, Daily  tamsulosin, 0 4 mg, Oral, HS      PRN Meds Name, dose, route, time, how many doses given within the first 24 hrs :    acetaminophen, 650 mg, Oral, Q6H PRN  calcium carbonate, 500 mg, Oral, TID PRN  HYDROmorphone, 0 5 mg, Intravenous, Q6H PRN  ondansetron, 4 mg, Intravenous, Q6H PRN  oxyCODONE, 10 mg, Oral, Q6H PRN    1/27 x1  oxyCODONE, 5 mg, Oral, Q6H PRN         IVs Type, rate, and total amt  ordered/given:  Labs, imaging, other:      Consults and findings:  Urology: 1/26 - likely having stent discomfort due to recent procedure  UA likely positive due to stents in place  urine culture pending   reports no urinary symptoms - No acute  surgical intervention required  Continue pain control        Test Results after admission  Pertinent Lab tests (dates/results):  Culture results (blood, urine, spinal, wound, respiratory, etc ):  Imaging tests (dates/results):  EKG (dates/results):   Other test (dates/results):  Tests pending (dates/results):    Surgical or Invasive Procedures  Name of surgery/procedure:  Date & Time:  Patient Response:  Post-operative orders:  Operative Report/Findings:    Response to Treatment, Major Change in Condition, Major Charge in Treatment anytime during admission  Date: 1/27/2023   Day 2:   Attending MD  conservatively managed with pain control  no urologic interventions recommended   By the day following presentation, pain  improved   Patient was recommended to continue with oxybutynin at home  [facility] remained stable and cleared for discharge on 1/27/2023   She is instructed to follow-up with urologist in the office   Incidentally noted is a 1 cm right adrenal nodule for OP PCP follow up      Hospital Course:   Caryle Romney is a 56 y o  female patient who originally presented to the hospital on 1/26/2023 due to right flank pain  Patient has history of bilateral ureteral stents that were encrusted and had recent right-sided lithotripsy with bilateral ureteral stent placement on 1/23/2023  She presented with right flank pain and CT findings as above  She was conservatively managed with pain control  She was seen by urology and no urologic interventions recommended  By the day following presentation, pain was improved  Patient was recommended to continue with oxybutynin at home  She remained stable and cleared for discharge on 1/27/2023  She is instructed to follow-up with urologist in the office  Incidentally noted is a 1 cm right adrenal nodule as indicated above  Patient to follow with her primary care physician for additional imaging studies in 12 months  Disposition on Discharge  Home, Rehab, SNF, LTC, Shelter, etc : Home/Self Care    Cease to Breathe (CTB)  If a patient expires during an admission, in addition to the above information, please include:    Summary/timeline of the patient's decline in condition:    Medications and treatment:    Patient response to treatment:    Date and time patient ceased to breathe:        Is there a Readmission that follows this admission?    Yes X No    If yes, provide dates:          InterQual Review    InterQual Criteria Met: X Yes  No  N/A        Please include the InterQual Review, InterQual year/version used, and the criteria selected:     Created Using Review Status Review Entered   Chaparrita Burciaga In Primary 1/27/2023 1408       Created By   Namita Wilde RN       Criteria Set Name - Subset   LOC:Acute Adult-General Medical      Criteria Review   REVIEW SUMMARY     InterQual® Review Status: In Primary  Review Type: Initial Review  Criteria Status: Acute Met  Day of review: Episode Day 1  Condition Specific: Yes        REVIEW DETAILS     Product: [employer] Adult  Subset: General Medical            [X] Select Day, One:          [X] Episode Day 1, One:              [X] ACUTE, >= One:                  [X] Genitourinary, >= One:                      [X] Hydronephrosis and, Both:                          [X] Finding, >= One:                              [X] Pain                          [X] Intervention, >= One:                              [X] Analgesic >= 3x/24h or continuous        Version: InterQual® 2022, Apr 2022 Release  InterQual® criteria (IQ) is confidential and proprietary information and is being provided to you solely as it pertains to the information requested  IQ may contain advanced clinical knowledge which we recommend you discuss with your physician upon disclosure to you  Use permitted by and subject to license with [employer] and/or one of its [place]  IQ reflects clinical interpretations and analyses and cannot alone either (a) resolve medical ambiguities of particular situations; or (b) provide the sole basis for definitive decisions  IQ is intended solely for use as screening guidelines with respect to medical appropriateness of healthcare services  All ultimate care decisions are strictly and solely the obligation and responsibility of your health care provider  © 1007 [facility] and/or one of its subsidiaries  All Rights Reserved   CPT® only © 2916-0909 [employer] Association  All Rights Reserved  PLEASE SUBMIT THE COMPLETED FORM TO THE DEPARTMENT OF HUMAN SERVICES - DIVISION OF CLINICAL  REVIEW VIA FAX [phone] or VIA E-MAIL TO [email or web address]    Signature: Jignehs Hayden Date:  02/06/23    Confidentiality Notice: The documents accompanying this telecopy may contain confidential information belonging to the sender  The information is intended only for the use of the individual named above  If you are not the intended recipient, you are hereby notified  That any disclosure, copying, distribution or taking of any telecopy is strictly prohibited

## 2023-02-09 ENCOUNTER — TELEPHONE (OUTPATIENT)
Dept: INTERNAL MEDICINE CLINIC | Facility: CLINIC | Age: 58
End: 2023-02-09

## 2023-02-09 ENCOUNTER — PREP FOR PROCEDURE (OUTPATIENT)
Dept: UROLOGY | Facility: MEDICAL CENTER | Age: 58
End: 2023-02-09

## 2023-02-09 DIAGNOSIS — R39.89 SUSPECTED UTI: Primary | ICD-10-CM

## 2023-02-09 DIAGNOSIS — Z01.812 PRE-OPERATIVE LABORATORY EXAMINATION: ICD-10-CM

## 2023-02-09 DIAGNOSIS — N20.1 URETERAL STONE: ICD-10-CM

## 2023-02-09 DIAGNOSIS — N13.30 BILATERAL HYDRONEPHROSIS: ICD-10-CM

## 2023-02-09 NOTE — TELEPHONE ENCOUNTER
Talked to Dr Marlee Nolan oncology from [facility]      Patient has rectal cancer, getting chemo  She recently developed severe allergy reaction to one of the chemo medication, because of which she missed her last chemo dose  She has since moved to [place]  Dr Marlee Nolan says the treatment plan moving forward is either getting small drip infusion infusion desensitization (which per Dr Marlee Nolan is only available at [facility]) or switch to a different regimen and radiation  She recommend pt be evaluated by oncology and rad-onc asap  I will move pt's appointment with me up to next Monday

## 2023-02-09 NOTE — TELEPHONE ENCOUNTER
-Spoke to the patient Sarah Cho he scheduled her Surgical procedure on 04/03/23 at [facility] w/Dr Montano Frandy   He is aware the hospital will call the day prior with the arrival time, nothing to eat or drink after midnight, she will need a  to & from, and to hold blood thinning medications/vitamins 7 days prior      -Placed order for urine culture to be done at 03/17/23 visit      -Emailed surgical packet per request to [email or web address]  *    -Placed on [facility] 7715    -Surgical Consent to be signed at H&P visit on 03/17/23

## 2023-02-10 ENCOUNTER — HOSPITAL ENCOUNTER (INPATIENT)
Facility: HOSPITAL | Age: 58
LOS: 1 days | Discharge: HOME | End: 2023-02-14
Attending: EMERGENCY MEDICINE | Admitting: INTERNAL MEDICINE

## 2023-02-10 DIAGNOSIS — K94.01 BLEEDING FROM COLOSTOMY (HCC): ICD-10-CM

## 2023-02-10 DIAGNOSIS — E87.6 HYPOKALEMIA: ICD-10-CM

## 2023-02-10 DIAGNOSIS — K52.9 COLITIS: Primary | ICD-10-CM

## 2023-02-10 DIAGNOSIS — E27.8 ADRENAL NODULE (HCC): ICD-10-CM

## 2023-02-10 DIAGNOSIS — A04.72 C. DIFFICILE COLITIS: ICD-10-CM

## 2023-02-10 LAB
BASOPHILS # BLD AUTO: 0.01 THOUSANDS/ÂΜL (ref 0–0.1)
BASOPHILS NFR BLD AUTO: 0 % (ref 0–1)
EOSINOPHIL # BLD AUTO: 0 THOUSAND/ÂΜL (ref 0–0.61)
EOSINOPHIL NFR BLD AUTO: 0 % (ref 0–6)
ERYTHROCYTE [DISTWIDTH] IN BLOOD BY AUTOMATED COUNT: 17.8 % (ref 11.6–15.1)
HCT VFR BLD AUTO: 38.1 % (ref 34.8–46.1)
HGB BLD-MCNC: 12.9 G/DL (ref 11.5–15.4)
IMM GRANULOCYTES # BLD AUTO: 0.06 THOUSAND/UL (ref 0–0.2)
IMM GRANULOCYTES NFR BLD AUTO: 1 % (ref 0–2)
LYMPHOCYTES # BLD AUTO: 0.62 THOUSANDS/ÂΜL (ref 0.6–4.47)
LYMPHOCYTES NFR BLD AUTO: 7 % (ref 14–44)
MCH RBC QN AUTO: 34.2 PG (ref 26.8–34.3)
MCHC RBC AUTO-ENTMCNC: 33.9 G/DL (ref 31.4–37.4)
MCV RBC AUTO: 101 FL (ref 82–98)
MONOCYTES # BLD AUTO: 0.34 THOUSAND/ÂΜL (ref 0.17–1.22)
MONOCYTES NFR BLD AUTO: 4 % (ref 4–12)
NEUTROPHILS # BLD AUTO: 8.54 THOUSANDS/ÂΜL (ref 1.85–7.62)
NEUTS SEG NFR BLD AUTO: 88 % (ref 43–75)
NRBC BLD AUTO-RTO: 0 /100 WBCS
PLATELET # BLD AUTO: 144 THOUSANDS/UL (ref 149–390)
PMV BLD AUTO: 9.9 FL (ref 8.9–12.7)
RBC # BLD AUTO: 3.77 MILLION/UL (ref 3.81–5.12)
WBC # BLD AUTO: 9.57 THOUSAND/UL (ref 4.31–10.16)

## 2023-02-11 ENCOUNTER — APPOINTMENT (EMERGENCY)
Dept: CT IMAGING | Facility: HOSPITAL | Age: 58
End: 2023-02-11

## 2023-02-11 PROBLEM — K94.01 BLEEDING FROM COLOSTOMY (HCC): Status: ACTIVE | Noted: 2023-02-11

## 2023-02-11 PROBLEM — Z86.19 HISTORY OF CLOSTRIDIUM DIFFICILE COLITIS: Status: ACTIVE | Noted: 2022-12-25

## 2023-02-11 PROBLEM — D86.9 SARCOIDOSIS: Status: RESOLVED | Noted: 2022-12-25 | Resolved: 2023-02-11

## 2023-02-11 PROBLEM — N18.9 CKD (CHRONIC KIDNEY DISEASE): Status: ACTIVE | Noted: 2022-06-01

## 2023-02-11 LAB
ABO GROUP BLD: NORMAL
ALBUMIN SERPL BCP-MCNC: 2.6 G/DL (ref 3.5–5)
ALBUMIN SERPL BCP-MCNC: 3.2 G/DL (ref 3.5–5)
ALP SERPL-CCNC: 351 U/L (ref 34–104)
ALP SERPL-CCNC: 426 U/L (ref 34–104)
ALT SERPL W P-5'-P-CCNC: 64 U/L (ref 7–52)
ALT SERPL W P-5'-P-CCNC: 85 U/L (ref 7–52)
ANION GAP SERPL CALCULATED.3IONS-SCNC: 7 MMOL/L (ref 4–13)
ANION GAP SERPL CALCULATED.3IONS-SCNC: 8 MMOL/L (ref 4–13)
APTT PPP: 25 SECONDS (ref 23–37)
APTT PPP: 25 SECONDS (ref 23–37)
AST SERPL W P-5'-P-CCNC: 38 U/L (ref 13–39)
AST SERPL W P-5'-P-CCNC: 47 U/L (ref 13–39)
BASOPHILS # BLD AUTO: 0.01 THOUSANDS/ÂΜL (ref 0–0.1)
BASOPHILS NFR BLD AUTO: 0 % (ref 0–1)
BILIRUB SERPL-MCNC: 1.35 MG/DL (ref 0.2–1)
BILIRUB SERPL-MCNC: 1.61 MG/DL (ref 0.2–1)
BLD GP AB SCN SERPL QL: NEGATIVE
BUN SERPL-MCNC: 12 MG/DL (ref 5–25)
BUN SERPL-MCNC: 12 MG/DL (ref 5–25)
CALCIUM ALBUM COR SERPL-MCNC: 9.2 MG/DL (ref 8.3–10.1)
CALCIUM ALBUM COR SERPL-MCNC: 9.5 MG/DL (ref 8.3–10.1)
CALCIUM SERPL-MCNC: 8.1 MG/DL (ref 8.4–10.2)
CALCIUM SERPL-MCNC: 8.9 MG/DL (ref 8.4–10.2)
CHLORIDE SERPL-SCNC: 104 MMOL/L (ref 96–108)
CHLORIDE SERPL-SCNC: 107 MMOL/L (ref 96–108)
CO2 SERPL-SCNC: 26 MMOL/L (ref 21–32)
CO2 SERPL-SCNC: 28 MMOL/L (ref 21–32)
CREAT SERPL-MCNC: 0.48 MG/DL (ref 0.6–1.3)
CREAT SERPL-MCNC: 0.61 MG/DL (ref 0.6–1.3)
EOSINOPHIL # BLD AUTO: 0.03 THOUSAND/ÂΜL (ref 0–0.61)
EOSINOPHIL NFR BLD AUTO: 0 % (ref 0–6)
ERYTHROCYTE [DISTWIDTH] IN BLOOD BY AUTOMATED COUNT: 17.3 % (ref 11.6–15.1)
GFR SERPL CREATININE-BSD FRML MDRD: 102 ML/MIN/1.73SQ M
GFR SERPL CREATININE-BSD FRML MDRD: 110 ML/MIN/1.73SQ M
GLUCOSE SERPL-MCNC: 166 MG/DL (ref 65–140)
GLUCOSE SERPL-MCNC: 92 MG/DL (ref 65–140)
HCT VFR BLD AUTO: 32.8 % (ref 34.8–46.1)
HGB BLD-MCNC: 10.6 G/DL (ref 11.5–15.4)
HGB BLD-MCNC: 11.1 G/DL (ref 11.5–15.4)
HGB BLD-MCNC: 11.4 G/DL (ref 11.5–15.4)
HOLD SPECIMEN: NORMAL
IMM GRANULOCYTES # BLD AUTO: 0.04 THOUSAND/UL (ref 0–0.2)
IMM GRANULOCYTES NFR BLD AUTO: 1 % (ref 0–2)
INR PPP: 0.99 (ref 0.84–1.19)
INR PPP: 1.06 (ref 0.84–1.19)
LYMPHOCYTES # BLD AUTO: 0.88 THOUSANDS/ÂΜL (ref 0.6–4.47)
LYMPHOCYTES NFR BLD AUTO: 12 % (ref 14–44)
MAGNESIUM SERPL-MCNC: 2 MG/DL (ref 1.9–2.7)
MCH RBC QN AUTO: 32.4 PG (ref 26.8–34.3)
MCHC RBC AUTO-ENTMCNC: 32.3 G/DL (ref 31.4–37.4)
MCV RBC AUTO: 100 FL (ref 82–98)
MONOCYTES # BLD AUTO: 0.3 THOUSAND/ÂΜL (ref 0.17–1.22)
MONOCYTES NFR BLD AUTO: 4 % (ref 4–12)
NEUTROPHILS # BLD AUTO: 5.88 THOUSANDS/ÂΜL (ref 1.85–7.62)
NEUTS SEG NFR BLD AUTO: 83 % (ref 43–75)
NRBC BLD AUTO-RTO: 0 /100 WBCS
PLATELET # BLD AUTO: 101 THOUSANDS/UL (ref 149–390)
PMV BLD AUTO: 10 FL (ref 8.9–12.7)
POTASSIUM SERPL-SCNC: 2.6 MMOL/L (ref 3.5–5.3)
POTASSIUM SERPL-SCNC: 2.7 MMOL/L (ref 3.5–5.3)
POTASSIUM SERPL-SCNC: 2.7 MMOL/L (ref 3.5–5.3)
POTASSIUM SERPL-SCNC: 3.7 MMOL/L (ref 3.5–5.3)
PROT SERPL-MCNC: 4.5 G/DL (ref 6.4–8.4)
PROT SERPL-MCNC: 5.7 G/DL (ref 6.4–8.4)
PROTHROMBIN TIME: 13.3 SECONDS (ref 11.6–14.5)
PROTHROMBIN TIME: 14 SECONDS (ref 11.6–14.5)
RBC # BLD AUTO: 3.27 MILLION/UL (ref 3.81–5.12)
RH BLD: POSITIVE
SODIUM SERPL-SCNC: 140 MMOL/L (ref 135–147)
SODIUM SERPL-SCNC: 140 MMOL/L (ref 135–147)
SPECIMEN EXPIRATION DATE: NORMAL
WBC # BLD AUTO: 7.14 THOUSAND/UL (ref 4.31–10.16)

## 2023-02-11 RX ORDER — POTASSIUM CHLORIDE 20 MEQ/1
40 TABLET, EXTENDED RELEASE ORAL EVERY 4 HOURS
Status: COMPLETED | OUTPATIENT
Start: 2023-02-11 | End: 2023-02-11

## 2023-02-11 RX ORDER — POTASSIUM CHLORIDE 20 MEQ/1
20 TABLET, EXTENDED RELEASE ORAL ONCE
Status: COMPLETED | OUTPATIENT
Start: 2023-02-11 | End: 2023-02-11

## 2023-02-11 RX ORDER — PREDNISONE 20 MG/1
20 TABLET ORAL DAILY
Status: DISCONTINUED | OUTPATIENT
Start: 2023-02-11 | End: 2023-02-14 | Stop reason: HOSPADM

## 2023-02-11 RX ORDER — PREDNISONE 10 MG/1
10 TABLET ORAL DAILY
Status: DISCONTINUED | OUTPATIENT
Start: 2023-02-11 | End: 2023-02-14 | Stop reason: HOSPADM

## 2023-02-11 RX ORDER — PANTOPRAZOLE SODIUM 40 MG/10ML
40 INJECTION, POWDER, LYOPHILIZED, FOR SOLUTION INTRAVENOUS EVERY 12 HOURS SCHEDULED
Status: DISCONTINUED | OUTPATIENT
Start: 2023-02-11 | End: 2023-02-13

## 2023-02-11 RX ORDER — SODIUM CHLORIDE, SODIUM LACTATE, POTASSIUM CHLORIDE, CALCIUM CHLORIDE 600; 310; 30; 20 MG/100ML; MG/100ML; MG/100ML; MG/100ML
75 INJECTION, SOLUTION INTRAVENOUS CONTINUOUS
Status: DISCONTINUED | OUTPATIENT
Start: 2023-02-11 | End: 2023-02-11

## 2023-02-11 RX ORDER — ONDANSETRON 2 MG/ML
4 INJECTION INTRAMUSCULAR; INTRAVENOUS EVERY 6 HOURS PRN
Status: DISCONTINUED | OUTPATIENT
Start: 2023-02-11 | End: 2023-02-14 | Stop reason: HOSPADM

## 2023-02-11 RX ORDER — DEXTROSE, SODIUM CHLORIDE, AND POTASSIUM CHLORIDE 5; .45; .3 G/100ML; G/100ML; G/100ML
75 INJECTION INTRAVENOUS CONTINUOUS
Status: DISCONTINUED | OUTPATIENT
Start: 2023-02-11 | End: 2023-02-13

## 2023-02-11 RX ORDER — POTASSIUM CHLORIDE 14.9 MG/ML
20 INJECTION INTRAVENOUS ONCE
Status: COMPLETED | OUTPATIENT
Start: 2023-02-11 | End: 2023-02-11

## 2023-02-11 RX ADMIN — CAPECITABINE 1650 MG: 500 TABLET, FILM COATED ORAL at 21:34

## 2023-02-11 RX ADMIN — CYANOCOBALAMIN TAB 500 MCG 1000 MCG: 500 TAB at 09:17

## 2023-02-11 RX ADMIN — POTASSIUM CHLORIDE 20 MEQ: 1500 TABLET, EXTENDED RELEASE ORAL at 02:57

## 2023-02-11 RX ADMIN — POTASSIUM CHLORIDE 40 MEQ: 1500 TABLET, EXTENDED RELEASE ORAL at 13:32

## 2023-02-11 RX ADMIN — PREDNISONE 20 MG: 20 TABLET ORAL at 09:18

## 2023-02-11 RX ADMIN — PANTOPRAZOLE SODIUM 40 MG: 40 INJECTION, POWDER, FOR SOLUTION INTRAVENOUS at 21:41

## 2023-02-11 RX ADMIN — PANTOPRAZOLE SODIUM 40 MG: 40 INJECTION, POWDER, FOR SOLUTION INTRAVENOUS at 09:16

## 2023-02-11 RX ADMIN — POTASSIUM CHLORIDE 40 MEQ: 1500 TABLET, EXTENDED RELEASE ORAL at 11:49

## 2023-02-11 RX ADMIN — IOHEXOL 100 ML: 350 INJECTION, SOLUTION INTRAVENOUS at 00:46

## 2023-02-11 RX ADMIN — POTASSIUM CHLORIDE 40 MEQ: 1500 TABLET, EXTENDED RELEASE ORAL at 09:17

## 2023-02-11 RX ADMIN — POTASSIUM CHLORIDE 20 MEQ: 1500 TABLET, EXTENDED RELEASE ORAL at 09:17

## 2023-02-11 RX ADMIN — DEXTROSE, SODIUM CHLORIDE, AND POTASSIUM CHLORIDE 75 ML/HR: 5; .45; .3 INJECTION INTRAVENOUS at 11:49

## 2023-02-11 RX ADMIN — PREDNISONE 10 MG: 20 TABLET ORAL at 09:18

## 2023-02-11 RX ADMIN — POTASSIUM CHLORIDE 20 MEQ: 14.9 INJECTION, SOLUTION INTRAVENOUS at 01:32

## 2023-02-11 RX ADMIN — SODIUM CHLORIDE, SODIUM LACTATE, POTASSIUM CHLORIDE, AND CALCIUM CHLORIDE 75 ML/HR: 600; 310; 30; 20 INJECTION, SOLUTION INTRAVENOUS at 05:32

## 2023-02-11 RX ADMIN — CAPECITABINE 1650 MG: 500 TABLET, FILM COATED ORAL at 12:18

## 2023-02-11 RX ADMIN — POTASSIUM CHLORIDE 20 MEQ: 1500 TABLET, EXTENDED RELEASE ORAL at 02:49

## 2023-02-11 NOTE — ED ATTENDING ATTESTATION
2/10/2023  IConcepción MD, saw and evaluated the patient  I have discussed the patient with the resident/non-physician practitioner and agree with the resident's/non-physician practitioner's findings, Plan of Care, and MDM as documented in the resident's/non-physician practitioner's note, except where noted  All available labs and Radiology studies were reviewed  I was present for key portions of any procedure(s) performed by the resident/non-physician practitioner and I was immediately available to provide assistance  At this point I agree with the current assessment done in the Emergency Department  I have conducted an independent evaluation of this patient a history and physical is as follows:    56-year-old female with a history of a colostomy bag presents with abdominal pain and bright red blood in her colostomy bag  There is obvious blood in the bag but her abdominal examination is benign  Differential diagnosis included but not limited to infectious versus inflammatory colitis, fistula,    /74 (BP Location: Right arm)   Pulse 86   Temp 98 °F (36 7 °C) (Oral)   Resp 18   Wt 65 4 kg (144 lb 2 9 oz)   SpO2 99%   BMI 26 37 kg/m²       Plan: CBC, CMP, coags, type and screen, CT abdomen pelvis    ED Course     Labs and imaging reviewed  Patient found to have a new colitis on CT scan  She will be admitted to the hospital for further evaluation and treatment  See resident's notation for further details      Critical Care Time  Procedures

## 2023-02-11 NOTE — ASSESSMENT & PLAN NOTE
BP Readings from Last 3 Encounters:   02/11/23 165/94   01/27/23 137/77   01/23/23 162/78     Pt does not currently take medications to control BP  Previously was on amlodipine in 2022     Monitor BP while admitted  Consider hydralazine 10mg q6h prn SBP >180

## 2023-02-11 NOTE — ASSESSMENT & PLAN NOTE
Lab Results   Component Value Date    EGFR 102 02/10/2023    EGFR 98 01/27/2023    EGFR 87 01/26/2023    CREATININE 0 61 02/10/2023    CREATININE 0 68 01/27/2023    CREATININE 0 77 01/26/2023     Creatinine currently stable  IVF gentle hydration due to NPO  Monitor CMP

## 2023-02-11 NOTE — ASSESSMENT & PLAN NOTE
CT abdomen pelvis with contrast    Result Date: 2/11/2023  Impression: Stable 1 cm right adrenal gland nodule  Although its imaging features are indeterminate, it does not have suspicious imaging features (heterogeneity, necrosis, irregular margins), therefore this is likely benign, and can be followed by non-contrast abdomen CT or MRI in 12 months  If patient has history of adrenal hyperfunction, consider biochemical evaluation  Adrenal nodule is again noted on CT  Pt should have follow CT or MRI in 12 months as recommended  Monitor for acute changes in symptoms

## 2023-02-11 NOTE — ASSESSMENT & PLAN NOTE
Pt has history of sarcoidosis and follows with [facility] for treatment; has been stable    Continue prednisone 30mg daily; pt reports she takes both her 10mg and 20mg doses in the AM

## 2023-02-11 NOTE — ASSESSMENT & PLAN NOTE
Lab Results   Component Value Date    K 2 7 (LL) 02/10/2023    K 4 4 01/27/2023    K 2 9 (L) 01/26/2023       Pt has chronic hypokalemia and takes 10mg potassium chloride daily at home     POA: K+ found to be 2 7 which was repleted  Monitor on telemetry  Pt prefers oral potassium if possible due to severe burning pain with IV potassium

## 2023-02-11 NOTE — CONSULTS
Consultation -[facility]   Luisa Rome 56 y o  female MRN: [id number]    Unit/Bed#: S -01 Encounter: [id number]      Physician Requesting Consult: Dr Brianne Cole    Reason for Consult / Principal Problem: blood in colostomy    HPI:   This is a 56 y o  female with colorectal active cancer s/p colostomy who presents with one day of blood in her colostomy bag  She states that since having her colostomy, she has noted small amounts of blood at times but not as much as yesterday    It was bright red and filled about a quarter of her bag  Reports emptying her colostomy about twice a day  She denies any abdominal pain  She has not had any other symptoms that she can recall  She states that in the past she did have an episode with bleeding but it was associated with significant abdominal pain and she had a colon infection at that time  She reports previous recurrent C diff as well whenever she takes antibiotics and if she requires antibiotics, she is often given prophylactic medications for C diff  She has not recently taken antibiotics and denies recent changes to her medication regimen  Patient is on treatment with oral Xeloda and was getting oxaliplatin and reports last dose was a few weeks ago  She otherwise denies any nausea or vomiting symptoms  Stools were collected from patient's ostomy today to evaluate for enteric panel as well as C  Difficile    Patient denies any increase in stool frequency in the recent past     Allergies: No Known Allergies    Medications:  Current Facility-Administered Medications:   •  capecitabine (XELODA) tablet 1,650 mg, 1,650 mg, Oral, BID, Gilles Arenas DO  •  cyanocobalamin (VITAMIN B-12) tablet 1,000 mcg, 1,000 mcg, Oral, Daily, Gilles Arenas DO, 1,000 mcg at 02/11/23 7846  •  lactated ringers infusion, 75 mL/hr, Intravenous, Continuous, Gilles Arenas DO, Last Rate: 75 mL/hr at 02/11/23 0532, 75 mL/hr at 02/11/23 0532  •  ondansetron [facility] injection 4 mg, 4 mg, Intravenous, Q6H PRN, Wisam Edwards, DO  •  pantoprazole (PROTONIX) injection 40 mg, 40 mg, Intravenous, Q12H [facility], Wisam Simons[place], DO, 40 mg at 02/11/23 0916  •  potassium chloride (K-DUR,KLOR-CON) CR tablet 40 mEq, 40 mEq, Oral, Q4H, Wisam Edwards, DO, 40 mEq at 02/11/23 8178  •  predniSONE tablet 10 mg, 10 mg, Oral, Daily, Wisam Edwards, DO, 10 mg at 02/11/23 4983  •  predniSONE tablet 20 mg, 20 mg, Oral, Daily, Wisam Edwards, DO, 20 mg at 02/11/23 8217    Past Medical history:  Past Medical History:   Diagnosis Date   • Colon cancer ([facility] [address] )    • Fall    • Headache    • Hemochromatosis, unspecified    • History of transfusion     2022 - no adverse reaction   • Kidney calculi    • Kidney stone    • Liver disease     hemangioma   • Muscle weakness    • Sarcoidosis    • Seizures ([facility] [address] )     2022       Past Surgical History:   Past Surgical History:   Procedure Laterality Date   • ABSCESS DRAINAGE      left breast   • CHEST TUBE INSERTION     • COLON SURGERY      colostomy   • COLONOSCOPY     • FL RETROGRADE PYELOGRAM  1/23/2023   • LUNG BIOPSY     • IN CYSTO/URETERO W/LITHOTRIPSY &INDWELL STENT INSRT Bilateral 1/23/2023    Procedure: CYSTOSCOPY  RIGHT URETEROSCOPY WITH LITHOTRIPSY HOLMIUM LASER, BILATERAL RETROGRADE PYELOGRAM AND BILATERAL  URETERAL STENT INSERTION;  Surgeon: Julio C Morelos MD;  Location: [facility];  Service: Urology   • TONSILLECTOMY     • URINARY SURGERY Bilateral     bilateral stents       Family History: History reviewed  No pertinent family history      Social history:   Social History     Socioeconomic History   • Marital status: Single     Spouse name: Not on file   • Number of children: Not on file   • Years of education: Not on file   • Highest education level: Not on file   Occupational History   • Not on file   Tobacco Use   • Smoking status: Never     Passive exposure: Past   • Smokeless tobacco: Never   Vaping Use   • Vaping Use: Never used   Substance and Sexual Activity • Alcohol use: Never   • Drug use: Never   • Sexual activity: Not on file   Other Topics Concern   • Not on file   Social History Narrative   • Not on file     Social Determinants of Health     Financial Resource Strain: Not on file   Food Insecurity: Not on file   Transportation Needs: Not on file   Physical Activity: Not on file   Stress: Not on file   Social Connections: Not on file   Intimate Partner Violence: Not on file   Housing Stability: Not on file       Review of Systems: All other systems were reviewed and were negative, otherwise please refer to HPI    Physical Exam: /65   Pulse 88   Temp 99 1 °F (37 3 °C)   Resp 18   Wt 65 4 kg (144 lb 2 9 oz)   SpO2 98%   BMI 26 37 kg/m²     General Appearance:    Alert, cooperative, no distress, appears stated age   Head:    Normocephalic, without obvious abnormality, atraumatic   Eyes:    No scleral icterus           Mouth:  Mucosa moist   Neck:   Supple, symmetrical, trachea midline, no thyromegaly       Lungs:     Clear to auscultation bilaterally, respirations unlabored       Heart:    Regular rate and rhythm, S1 and S2 normal, no murmur, rub   or gallop     Abdomen:     Soft, non-tender, bowel sounds active all four quadrants,     + Dark greenish stool in colostomy bag, no blood   Genitalia:   deferred   Rectal:   deferred   Extremities:   Extremities normal,no cyanosis or edema       Skin:   Skin color, texture, turgor normal, no rashes or lesions       Neurologic:   Grossly intact, no focal deficit           Lab Results:   Recent Results (from the past 24 hour(s))   Green / Yellow tube on hold    Collection Time: 02/10/23 10:20 PM   Result Value Ref Range    Extra Tube Hold for add-ons  Kaylin Millin / Black tube on hold    Collection Time: 02/10/23 10:20 PM   Result Value Ref Range    Extra Tube Hold for add-ons  Lavender Top 3 ml on hold    Collection Time: 02/10/23 10:20 PM   Result Value Ref Range    Extra Tube Hold for add-ons      CBC and differential    Collection Time: 02/10/23 10:20 PM   Result Value Ref Range    WBC 9 57 4 31 - 10 16 Thousand/uL    RBC 3 77 (L) 3 81 - 5 12 Million/uL    Hemoglobin 12 9 11 5 - 15 4 g/dL    Hematocrit 38 1 34 8 - 46 1 %     (H) 82 - 98 fL    MCH 34 2 26 8 - 34 3 pg    MCHC 33 9 31 4 - 37 4 g/dL    RDW 17 8 (H) 11 6 - 15 1 %    MPV 9 9 8 9 - 12 7 fL    Platelets 347 (L) 634 - 390 Thousands/uL    nRBC 0 /100 WBCs    Neutrophils Relative 88 (H) 43 - 75 %    Immat GRANS % 1 0 - 2 %    Lymphocytes Relative 7 (L) 14 - 44 %    Monocytes Relative 4 4 - 12 %    Eosinophils Relative 0 0 - 6 %    Basophils Relative 0 0 - 1 %    Neutrophils Absolute 8 54 (H) 1 85 - 7 62 Thousands/µL    Immature Grans Absolute 0 06 0 00 - 0 20 Thousand/uL    Lymphocytes Absolute 0 62 0 60 - 4 47 Thousands/µL    Monocytes Absolute 0 34 0 17 - 1 22 Thousand/µL    Eosinophils Absolute 0 00 0 00 - 0 61 Thousand/µL    Basophils Absolute 0 01 0 00 - 0 10 Thousands/µL   Protime-INR    Collection Time: 02/10/23 10:20 PM   Result Value Ref Range    Protime 13 3 11 6 - 14 5 seconds    INR 0 99 0 84 - 1 19   APTT    Collection Time: 02/10/23 10:20 PM   Result Value Ref Range    PTT 25 23 - 37 seconds   Comprehensive metabolic panel    Collection Time: 02/10/23 10:20 PM   Result Value Ref Range    Sodium 140 135 - 147 mmol/L    Potassium 2 7 (LL) 3 5 - 5 3 mmol/L    Chloride 104 96 - 108 mmol/L    CO2 28 21 - 32 mmol/L    ANION GAP 8 4 - 13 mmol/L    BUN 12 5 - 25 mg/dL    Creatinine 0 61 0 60 - 1 30 mg/dL    Glucose 166 (H) 65 - 140 mg/dL    Calcium 8 9 8 4 - 10 2 mg/dL    Corrected Calcium 9 5 8 3 - 10 1 mg/dL    AST 47 (H) 13 - 39 U/L    ALT 85 (H) 7 - 52 U/L    Alkaline Phosphatase 426 (H) 34 - 104 U/L    Total Protein 5 7 (L) 6 4 - 8 4 g/dL    Albumin 3 2 (L) 3 5 - 5 0 g/dL    Total Bilirubin 1 61 (H) 0 20 - 1 00 mg/dL    eGFR 102 ml/min/1 73sq m   Type and screen    Collection Time: 02/10/23 11:55 PM   Result Value Ref Range    ABO Grouping A Rh Factor Positive     Antibody Screen Negative     Specimen Expiration Date [id number]    ECG 12 lead    Collection Time: 02/11/23  1:27 AM   Result Value Ref Range    Ventricular Rate 72 BPM    Atrial Rate 72 BPM    CA Interval 200 ms    QRSD Interval 74 ms    QT Interval 386 ms    QTC Interval 422 ms    P [place] 47 degrees    QRS Axis 18 degrees    T Wave Axis 64 degrees   CBC and differential    Collection Time: 02/11/23  4:35 AM   Result Value Ref Range    WBC 7 14 4 31 - 10 16 Thousand/uL    RBC 3 27 (L) 3 81 - 5 12 Million/uL    Hemoglobin 10 6 (L) 11 5 - 15 4 g/dL    Hematocrit 32 8 (L) 34 8 - 46 1 %     (H) 82 - 98 fL    MCH 32 4 26 8 - 34 3 pg    MCHC 32 3 31 4 - 37 4 g/dL    RDW 17 3 (H) 11 6 - 15 1 %    MPV 10 0 8 9 - 12 7 fL    Platelets 086 (L) 731 - 390 Thousands/uL    nRBC 0 /100 WBCs    Neutrophils Relative 83 (H) 43 - 75 %    Immat GRANS % 1 0 - 2 %    Lymphocytes Relative 12 (L) 14 - 44 %    Monocytes Relative 4 4 - 12 %    Eosinophils Relative 0 0 - 6 %    Basophils Relative 0 0 - 1 %    Neutrophils Absolute 5 88 1 85 - 7 62 Thousands/µL    Immature Grans Absolute 0 04 0 00 - 0 20 Thousand/uL    Lymphocytes Absolute 0 88 0 60 - 4 47 Thousands/µL    Monocytes Absolute 0 30 0 17 - 1 22 Thousand/µL    Eosinophils Absolute 0 03 0 00 - 0 61 Thousand/µL    Basophils Absolute 0 01 0 00 - 0 10 Thousands/µL   Comprehensive metabolic panel    Collection Time: 02/11/23  4:35 AM   Result Value Ref Range    Sodium 140 135 - 147 mmol/L    Potassium 2 6 (LL) 3 5 - 5 3 mmol/L    Chloride 107 96 - 108 mmol/L    CO2 26 21 - 32 mmol/L    ANION GAP 7 4 - 13 mmol/L    BUN 12 5 - 25 mg/dL    Creatinine 0 48 (L) 0 60 - 1 30 mg/dL    Glucose 92 65 - 140 mg/dL    Calcium 8 1 (L) 8 4 - 10 2 mg/dL    Corrected Calcium 9 2 8 3 - 10 1 mg/dL    AST 38 13 - 39 U/L    ALT 64 (H) 7 - 52 U/L    Alkaline Phosphatase 351 (H) 34 - 104 U/L    Total Protein 4 5 (L) 6 4 - 8 4 g/dL    Albumin 2 6 (L) 3 5 - 5 0 g/dL    Total Bilirubin 1 35 (H) 0 20 - 1 00 mg/dL    eGFR 110 ml/min/1 73sq m   Magnesium    Collection Time: 02/11/23  4:35 AM   Result Value Ref Range    Magnesium 2 0 1 9 - 2 7 mg/dL   Protime-INR    Collection Time: 02/11/23  4:35 AM   Result Value Ref Range    Protime 14 0 11 6 - 14 5 seconds    INR 1 06 0 84 - 1 19   APTT    Collection Time: 02/11/23  4:35 AM   Result Value Ref Range    PTT 25 23 - 37 seconds       Imaging Studies: FL retrograde pyelogram    Result Date: 1/27/2023  Narrative: BILATERAL RETROGRADE PYELOGRAM INDICATION:   Bilateral ureteral calculi [N20 1]  COMPARISON: Abdomen and pelvic CT from 1/6/2023  IMAGES:  32 FLUOROSCOPY TIME:   5 minutes 48 seconds CONTRAST: 30 mL of iohexol (OMNIPAQUE) FINDINGS: Fluoroscopic guidance provided for retrograde pyelogram  Images provided demonstrate performance of right retrograde pyelogram, right ureteroscopy, with right ureteral stent placement  Mild hydroureteronephrosis noted  A small amount of extravasated contrast is seen around the right proximal ureter  Osseous  and soft tissue detail limited by technique  Images provided also demonstrate performance of left retrograde pyelogram with left ureteral stent placement  Impression: Fluoroscopic guidance provided for bilateral retrograde pyelogram  Please see procedure report for further details  Workstation performed: [id number]     CT abdomen pelvis with contrast    Result Date: 2/11/2023  Narrative: CT ABDOMEN AND PELVIS WITH IV CONTRAST INDICATION:   Bleeding from colostomy bag, investigating for fistula, complication related to known colon cancer    COMPARISON:  1/26/2023  TECHNIQUE:  CT examination of the abdomen and pelvis was performed  Axial, sagittal, and coronal 2D reformatted images were created from the source data and submitted for interpretation  Radiation dose length product (DLP) for this visit:  260 mGy-cm     This examination, like all CT scans performed in the [facility], was performed utilizing techniques to minimize radiation dose exposure, including the use of iterative reconstruction and automated exposure control  IV Contrast:  100 mL of iohexol (OMNIPAQUE) Enteric Contrast:  Enteric contrast was not administered  FINDINGS: ABDOMEN LOWER CHEST:  Atelectatic changes are noted at the lung bases  LIVER/BILIARY TREE:  Unremarkable  GALLBLADDER:  No calcified gallstones  No pericholecystic inflammatory change  SPLEEN:  Unremarkable  PANCREAS:  Unremarkable  ADRENAL GLANDS:  Stable 1 cm nodule in the lateral limb of the right adrenal gland  KIDNEYS/URETERS:  Bilateral nephroureteral stents similar to prior study  Mild right-sided hydronephrosis is seen  Nonobstructing bilateral punctate intrarenal calculi  STOMACH AND BOWEL:  Left-sided colostomy is seen  Diffuse thickening of the ascending colon and cecum is seen  APPENDIX:  No findings to suggest appendicitis  ABDOMINOPELVIC CAVITY: Free fluid in the abdomen and pelvis is seen  VESSELS:  Unremarkable for patient's age  PELVIS REPRODUCTIVE ORGANS:  Unremarkable for patient's age  URINARY BLADDER:  TIPS of the nephroureteral stents are seen in the urinary bladder    ABDOMINAL WALL/INGUINAL REGIONS:  Fat-containing right inguinal hernia  Small fat-containing umbilical wall hernia    OSSEOUS STRUCTURES:  No acute fracture or osseous destructive lesion identified  Degenerative changes of the spine  Transitional lumbosacral vertebra attributed to sacralized L5 vertebral body  Impression: Interval decrease in right-sided hydronephrosis  Thickening of the ascending colon and cecum which may represent colitis  Follow-up with gastroenterology  Stable 1 cm right adrenal gland nodule  Although its imaging features are indeterminate, it does not have suspicious imaging features (heterogeneity, necrosis, irregular margins), therefore this is likely benign, and can be followed by non-contrast abdomen CT or MRI in 12 months    If patient has history of adrenal hyperfunction, consider biochemical evaluation  The study was marked in [facility] for immediate notification  Workstation performed: [id number]     CT abdomen pelvis with contrast    Result Date: 1/26/2023  Narrative: CT ABDOMEN AND PELVIS WITH IV CONTRAST INDICATION:   Abdominal pain, acute, nonlocalized left sided abdominal/flank pain  COMPARISON:  CT abdomen and pelvis 1/6/2023 TECHNIQUE:  CT examination of the abdomen and pelvis was performed  In addition to portal venous phase postcontrast scanning through the abdomen and pelvis, delayed phase postcontrast scanning was performed through the upper abdominal viscera  Axial, sagittal, and coronal 2D reformatted images were created from the source data and submitted for interpretation  Radiation dose length product (DLP) for this visit:  638 mGy-cm   This examination, like all CT scans performed in the [facility], was performed utilizing techniques to minimize radiation dose exposure, including the use of iterative reconstruction and automated exposure control  IV Contrast:  100 mL of iohexol (OMNIPAQUE)  350 Enteric Contrast:  Enteric contrast was not administered  FINDINGS: ABDOMEN LOWER CHEST:  Minimal left lower lobe juxtapleural platelike scarring  Tiny sliding hiatal hernia  Stable 6 mm inferior left breast superficial nodule image 22 series 301  LIVER/BILIARY TREE:  Liver is diffusely decreased in density consistent with fatty change  No CT evidence of suspicious hepatic mass  Normal hepatic contours  No biliary dilatation  GALLBLADDER:  No calcified gallstones  Trace gallbladder wall thickening again visualized  SPLEEN:  Unremarkable  PANCREAS:  Unremarkable  ADRENAL GLANDS:  Stable 1 cm nodule in the lateral limb of the right adrenal gland  Unremarkable left adrenal gland   KIDNEYS/URETERS:  Bilateral nephroureteral stents in place with proximal right pigtail in the renal pelvis and proximal left pigtail in the upper pole calyx  Moderate right hydronephrosis similar to prior study  Minimal right proximal ureteral calcification beginning at the UPJ extending over a length of 2 5 cm; decreased calcification since the prior study  New right renal lower pole calculi, 1 3 cm and smaller  Minimal right renal pelvic urothelial thickening and hyperemia has increased with new trace parapelvic stranding  No perinephric collection  Trace residual albeit markedly improved left hydronephrosis  Minimal fat stranding adjacent to the left renal pelvis and proximal ureter with decreased urothelial hyperemia  Trace new renal lower pole calyceal hyperemia likely sequela of recent procedure  Stable calcifications in the proximal left ureter, 3 mm and smaller prior punctate calcification in the distal ureter is no longer present  Left renal calculi, 4 mm and smaller  No perinephric collection  STOMACH AND BOWEL:  Left ventral mid abdominal colostomy  Nondistended small bowel and colon limits the evaluation  No bowel obstruction  APPENDIX:  No findings to suggest appendicitis  ABDOMINOPELVIC CAVITY:  No ascites  No pneumoperitoneum  No lymphadenopathy  VESSELS:  Aortoiliac calcification  No aneurysm  PELVIS REPRODUCTIVE ORGANS:  Unremarkable for patient's age  URINARY BLADDER:  Distal pigtails of bilateral nephroureteral stents in the bladder lumen  Trace gas in the bladder lumen attributed to recent procedure  ABDOMINAL WALL/INGUINAL REGIONS:  Small fat-containing umbilical hernia  Tiny fat-containing right inguinal hernia OSSEOUS STRUCTURES:  No acute fracture or osseous destructive lesion identified  Degenerative changes of the spine  Transitional lumbosacral vertebra attributed to sacralized L5 vertebral body  Impression: Moderate right hydronephrosis with right nephroureteral stent in place similar to prior study   Proximal right ureteral calcification has decreased with new right renal lower pole stone fragments, 1 3 cm and smaller attributed to post lithotripsy retrograde migration  Trace residual albeit markedly improved left hydronephrosis with left nephroureteral stent in place  Stable proximal left ureteral calculi, 3 mm and smaller  Punctate distal left ureteral calculus present previously is no longer visualized  Stable left renal calculi, 4 mm and smaller  Mildly progressive right renal pyelitis in the left renal lower pole calyceal hyperemia may be residual inflammation from recent procedure  Correlate with UA to exclude UTI  No perinephric collection  Stable minimal gallbladder wall thickening without calcified gallstones  This may be sequela of chronic hepatocellular disease, partially contracted gallbladder or nonspecific chronic inflammation  This could be followed up with nonemergent gallbladder ultrasound  Stable 1 cm right adrenal nodule  Although its imaging features are indeterminate, it does not have suspicious imaging features (heterogeneity, necrosis, irregular margins), therefore this is likely benign, and can be followed by non-contrast abdomen CT or MRI in 12 months  If patient has history of adrenal hyperfunction, consider biochemical evaluation  Adrenal recommendation based on institutional consensus and Journal of [address] [place] [address] of Radiology 2017;[id number] This study demonstrates a significant  finding and was documented as such in [facility] for liaison and referring practitioner notification  This study demonstrates a significant  finding and was documented as such in [facility] for liaison and referring practitioner notification  Workstation performed: [id number]       Assessment/Plan:   Bleeding from colostomy-  This is a 45-year-old female with past medical history of rectal cancer diagnosed in December of 2022 s/p colostomy and currently on chemotherapy, sarcoidosis, HTN who presents with one day of bright red bleeding from her colostomy bag  The bleeding is painless   She also reports history of small amounts of blood but states that this bleeding filled her colostomy bag a quarter of the wayShe has history of recurrent C diff Colitis  No bloody stools were noted at time of examination  Not currently on [facility]  Most recent Colonoscopy, MRI AP, MRI pelvis 10/28 at [facility]:   "- Malignant partially obstructing tumor in the recto-sigmoid colon  Biopsied  Tattooed  - One 4 mm polyp in the descending colon  - One 10 mm polyp in the cecum   - The distal rectum and anal verge are normal on retroflexion view      MRI AP: 5 3 cm rectal mass involving the mid to upper rectum as described above  The mass abuts the posterior wall of the cervix with questionable early involvement of the cervical serosa as described above  MRI Pelvis: IMPRESSION:  Stage: T [3 c] N[0]  CRM: Clear  Sphincter involvement: No  Suspicious extra mesorectal lymph nodes: No    Patient on chemotherapy  Patient on oxaliplatin, which can cause platinum based therapy related colitis and diarrhea  She also is on Xeloda which can cause enterocolitis as well  We must rule out infectious etiology, especially with history of recurrent C  Difficile  Ideally if no significant bleeding is noted and she remains stable,  she should follow-up with gastroenterologist at [facility] for possible repeat colonoscopy  CT on admission showing thickening of the ascending colon and cecum which may represent colitis  Hemoglobin currently is not far from baseline and is 10 6 today but was 11 3 about a month ago  Will follow serial H&H    Thank you for the consultation  Patient was seen and examined with Dr Antelmo Salazar

## 2023-02-11 NOTE — ASSESSMENT & PLAN NOTE
Pt with history of recurrent C Diff  Presents with signs of colitis on CT and change in stool in stoma with blood in colostomy bag       Plan:   Check C diff toxin  Contact and hand hygiene isolation   Monitor stool output

## 2023-02-11 NOTE — ASSESSMENT & PLAN NOTE
Pt has active colorectal cancer and currently takes capecitabine 1650mg twice daily  She is seen at [facility] in [place], [place] by Dr Nazanin Beck for treatment  S/P colostomy    Plan:   Continue capecitabine BID

## 2023-02-11 NOTE — ED PROVIDER NOTES
History  Chief Complaint   Patient presents with   • Medical Problem     Pt reports bleeding into colostomy bad for a few hours  Denies pain  -thinners     HPI   Is a 68-year-old female with history of colon cancer status post partial colectomy and colostomy, nephrolithiasis with ureteral strictures presenting with bleeding from the colostomy bag  Initially noticed the bleeding beginning earlier this afternoon after she changed the bag  Also notes that she feels that she may have had a bowel movement per rectum despite surgery for colostomy being many months ago  She states that it appeared as if it was a normal, formed, brown bowel movement without any blood  Denies any abdominal pain, fevers, nausea, vomiting  Notes that she has never had any previous bleeding from the colostomy bag before  Prior to Admission Medications   Prescriptions Last Dose Informant Patient Reported? Taking?    CAPECITABINE PO 2/10/2023  Yes Yes   Sig: Take 1,650 mg by mouth 2 (two) times a day   POTASSIUM PO 2/10/2023  Yes Yes   Sig: Take by mouth   acetaminophen (TYLENOL) 325 mg tablet Unknown  Yes No   Sig: Take 650 mg by mouth every 6 (six) hours as needed for mild pain   cyanocobalamin (VITAMIN B-12) 1000 MCG tablet Unknown  Yes No   Sig: Take 1,000 mcg by mouth daily   diclofenac sodium (VOLTAREN) 50 mg EC tablet   No No   Sig: Take 1 tablet (50 mg total) by mouth 3 (three) times a day for 7 days   docusate sodium (COLACE) 100 mg capsule   No No   Sig: Take 1 capsule (100 mg total) by mouth 2 (two) times a day for 15 days   oxybutynin (DITROPAN) 5 mg tablet Not Taking  No No   Sig: Take 1 tablet (5 mg total) by mouth 3 (three) times a day   Patient not taking: Reported on 2/11/2023   predniSONE 10 mg tablet 2/10/2023  Yes Yes   Sig: Take 10 mg by mouth daily at bedtime   predniSONE 20 mg tablet 2/10/2023  Yes Yes   Sig: Take 20 mg by mouth in the morning Takes in am   tamsulosin (FLOMAX) 0 4 mg 2/10/2023  No Yes   Sig: Take 1 capsule (0 4 mg total) by mouth daily at bedtime      Facility-Administered Medications: None       Past Medical History:   Diagnosis Date   • Colon cancer [facility])    • Fall    • Headache    • Hemochromatosis, unspecified    • History of transfusion     2022 - no adverse reaction   • Kidney calculi    • Kidney stone    • Liver disease     hemangioma   • Muscle weakness    • Sarcoidosis    • Seizures ([address] )     2022       Past Surgical History:   Procedure Laterality Date   • ABSCESS DRAINAGE      left breast   • CHEST TUBE INSERTION     • COLON SURGERY      colostomy   • COLONOSCOPY     • FL RETROGRADE PYELOGRAM  1/23/2023   • LUNG BIOPSY     • TX CYSTO/URETERO W/LITHOTRIPSY &INDWELL STENT INSRT Bilateral 1/23/2023    Procedure: CYSTOSCOPY  RIGHT URETEROSCOPY WITH LITHOTRIPSY HOLMIUM LASER, BILATERAL RETROGRADE PYELOGRAM AND BILATERAL  URETERAL STENT INSERTION;  Surgeon: Fifi Jacob MD;  Location: [facility];  Service: Urology   • TONSILLECTOMY     • URINARY SURGERY Bilateral     bilateral stents       History reviewed  No pertinent family history  I have reviewed and agree with the history as documented  E-Cigarette/Vaping   • E-Cigarette Use Never User      E-Cigarette/Vaping Substances   • Nicotine No    • THC No    • CBD No      Social History     Tobacco Use   • Smoking status: Never     Passive exposure: Past   • Smokeless tobacco: Never   Vaping Use   • Vaping Use: Never used   Substance Use Topics   • Alcohol use: Never   • Drug use: Never        Review of Systems   Constitutional: Negative for chills and fever  HENT: Negative for ear pain and sore throat  Eyes: Negative for pain and visual disturbance  Respiratory: Negative for cough and shortness of breath  Cardiovascular: Negative for chest pain and palpitations  Gastrointestinal: Positive for blood in stool  Negative for abdominal pain and vomiting  Genitourinary: Negative for dysuria and hematuria     Musculoskeletal: Negative for arthralgias and back pain  Skin: Negative for color change and rash  Neurological: Negative for seizures and syncope  All other systems reviewed and are negative  Physical Exam  ED Triage Vitals   Temperature Pulse Respirations Blood Pressure SpO2   02/10/23 2205 02/10/23 2144 02/10/23 2144 02/10/23 2144 02/10/23 2144   98 °F (36 7 °C) 98 18 (!) 202/113 100 %      Temp Source Heart Rate Source Patient Position - Orthostatic VS BP Location FiO2 (%)   02/10/23 2205 02/10/23 2144 02/10/23 2144 02/10/23 2144 --   Oral Monitor Lying Right arm       Pain Score       02/11/23 0330       No Pain             Orthostatic Vital Signs  Vitals:    02/10/23 2144 02/10/23 2205 02/11/23 0327   BP: (!) 202/113 156/74 165/94   Pulse: 98 86 89   Patient Position - Orthostatic VS: Lying Lying        Physical Exam  Vitals and nursing note reviewed  Constitutional:       General: She is not in acute distress  Appearance: She is well-developed  HENT:      Head: Normocephalic and atraumatic  Eyes:      Conjunctiva/sclera: Conjunctivae normal    Cardiovascular:      Rate and Rhythm: Normal rate and regular rhythm  Heart sounds: No murmur heard  Pulmonary:      Effort: Pulmonary effort is normal  No respiratory distress  Breath sounds: Normal breath sounds  Abdominal:      Palpations: Abdomen is soft  Tenderness: There is no abdominal tenderness  Comments: Colostomy bag in place draining liquid appearing fecal material with bright red blood   Musculoskeletal:         General: No swelling  Cervical back: Neck supple  Skin:     General: Skin is warm and dry  Capillary Refill: Capillary refill takes less than 2 seconds  Findings: No erythema or rash  Neurological:      Mental Status: She is alert     Psychiatric:         Mood and Affect: Mood normal          ED Medications  Medications   capecitabine (XELODA) tablet 1,650 mg (has no administration in time range)   predniSONE tablet 10 mg (has no administration in time range)   predniSONE tablet 20 mg (has no administration in time range)   cyanocobalamin (VITAMIN B-12) tablet 1,000 mcg (has no administration in time range)   lactated ringers infusion (has no administration in time range)   ondansetron (ZOFRAN) injection 4 mg (has no administration in time range)   pantoprazole (PROTONIX) injection 40 mg (has no administration in time range)   potassium chloride 20 mEq IVPB (premix) (0 mEq Intravenous Stopped 2/11/23 0239)   iohexol (OMNIPAQUE) 350 MG/ML injection (SINGLE-DOSE) 100 mL (100 mL Intravenous Given 2/11/23 0046)   potassium chloride (K-DUR,KLOR-CON) CR tablet 20 mEq (20 mEq Oral Given 2/11/23 0249)   potassium chloride (K-DUR,KLOR-CON) CR tablet 20 mEq (20 mEq Oral Given 2/11/23 0257)       Diagnostic Studies  Results Reviewed     Procedure Component Value Units Date/Time    Protime-INR [[id number]]  (Normal) Collected: 02/10/23 2220    Lab Status: Final result Specimen: Blood from Arm, Right Updated: 02/11/23 0303     Protime 13 3 seconds      INR 0 99    APTT [[id number]]  (Normal) Collected: 02/10/23 2220    Lab Status: Final result Specimen: Blood from Arm, Right Updated: 02/11/23 0303     PTT 25 seconds     Comprehensive metabolic panel [[id number]]  (Abnormal) Collected: 02/10/23 2220    Lab Status: Final result Specimen: Blood from Arm, Right Updated: 02/11/23 0023     Sodium 140 mmol/L      Potassium 2 7 mmol/L      Chloride 104 mmol/L      CO2 28 mmol/L      ANION GAP 8 mmol/L      BUN 12 mg/dL      Creatinine 0 61 mg/dL      Glucose 166 mg/dL      Calcium 8 9 mg/dL      Corrected Calcium 9 5 mg/dL      AST 47 U/L      ALT 85 U/L      Alkaline Phosphatase 426 U/L      Total Protein 5 7 g/dL      Albumin 3 2 g/dL      Total Bilirubin 1 61 mg/dL      eGFR 102 ml/min/1 73sq m     Narrative:      [place] guidelines for Chronic Kidney Disease (CKD):   •  Stage 1 with normal or high GFR (GFR > 90 mL/min/1 73 square meters)  •  Stage 2 Mild CKD (GFR = 60-89 mL/min/1 73 square meters)  •  Stage 3A Moderate CKD (GFR = 45-59 mL/min/1 73 square meters)  •  Stage 3B Moderate CKD (GFR = 30-44 mL/min/1 73 square meters)  •  Stage 4 Severe CKD (GFR = 15-29 mL/min/1 73 square meters)  •  Stage 5 End Stage CKD (GFR <15 mL/min/1 73 square meters)  Note: GFR calculation is accurate only with a steady state creatinine    [place] draw [[id number]] Collected: 02/10/23 2220    Lab Status: Final result Specimen: Blood from Arm, Right Updated: 02/11/23 0001    Narrative: The following orders were created for panel order [place] draw  Procedure                               Abnormality         Status                     ---------                               -----------         ------                     Beckett Rude Top on LEYU[[id number]]                           Final result               Green / Yellow tube on MVYK[[id number]]                      Final result               Green / Black tube on BYUQ[[id number]]                       Final result               Lavender Top 3 ml on SYCZ[[id number]]                        Final result                 Please view results for these tests on the individual orders      CBC and differential [[id number]]  (Abnormal) Collected: 02/10/23 2220    Lab Status: Final result Specimen: Blood from Arm, Right Updated: 02/10/23 2343     WBC 9 57 Thousand/uL      RBC 3 77 Million/uL      Hemoglobin 12 9 g/dL      Hematocrit 38 1 %       fL      MCH 34 2 pg      MCHC 33 9 g/dL      RDW 17 8 %      MPV 9 9 fL      Platelets 935 Thousands/uL      nRBC 0 /100 WBCs      Neutrophils Relative 88 %      Immat GRANS % 1 %      Lymphocytes Relative 7 %      Monocytes Relative 4 %      Eosinophils Relative 0 %      Basophils Relative 0 %      Neutrophils Absolute 8 54 Thousands/µL      Immature Grans Absolute 0 06 Thousand/uL      Lymphocytes Absolute 0 62 Thousands/µL      Monocytes Absolute 0 34 Thousand/µL      Eosinophils Absolute 0 00 Thousand/µL      Basophils Absolute 0 01 Thousands/µL                  CT abdomen pelvis with contrast   Final Result by Jaydon Harper DO (02/11 0107)      Interval decrease in right-sided hydronephrosis  Thickening of the ascending colon and cecum which may represent colitis  Follow-up with gastroenterology  Stable 1 cm right adrenal gland nodule  Although its imaging features are indeterminate, it does not have suspicious imaging features (heterogeneity, necrosis, irregular margins), therefore this is likely benign, and can be followed by non-contrast    abdomen CT or MRI in 12 months  If patient has history of adrenal hyperfunction, consider biochemical evaluation  The study was marked in [facility] for immediate notification  Workstation performed: [id number]               Procedures  ECG 12 Lead Documentation Only    Date/Time: 2/11/2023 5:47 AM  Performed by: Garret Ingram DO  Authorized by: Garret Ingram DO     Indications / Diagnosis:  Hypokalemia  ECG reviewed by me, the ED Provider: yes    Patient location:  ED  Previous ECG:     Previous ECG:  Compared to current    Similarity:  No change  Interpretation:     Interpretation: normal    Rate:     ECG rate:  72    ECG rate assessment: normal    Rhythm:     Rhythm: sinus rhythm    Ectopy:     Ectopy: none    QRS:     QRS axis:  Left  Conduction:     Conduction: normal    ST segments:     ST segments:  Normal  T waves:     T waves: normal            ED Course                                       Medical Decision Making  56-year-old female patient with colon cancer status post colectomy presenting with bleeding in colostomy bag  On initial evaluation, patient vital signs were normal and she appeared to be in no acute distress  There is no abdominal tenderness or surrounding rash or erythema around the colostomy bag    Given patient high risk status, basic labs were done which showed stable hemoglobin but hypokalemia with potassium of 2 7  This could be related to watery stools and bleeding from colostomy bag  EKG was done and showed no acute changes related to hypokalemia  Potassium was repleted orally  CT scan of the abdomen and pelvis was done and showed a sending colitis, likely responsible for colostomy bleeding  At that time, case was discussed with JENELLE ANTONY who accepted the patient in stable condition due to her high risk medical history given active cancer and no prior colostomy bleeding  At time of admission, she was in stable condition and in agreement with plan  Bleeding from colostomy [facility]): acute illness or injury  Colitis: acute illness or injury  Hypokalemia: acute illness or injury  Amount and/or Complexity of Data Reviewed  Labs: ordered  Radiology: ordered  Risk  Prescription drug management  Decision regarding hospitalization  Disposition  Final diagnoses:   Colitis   Bleeding from colostomy [facility])   Hypokalemia     Time reflects when diagnosis was documented in both MDM as applicable and the Disposition within this note     Time User Action Codes Description Comment    2/11/2023  2:06 AM Sj Andre [K52 9] Colitis     2/11/2023  2:06 AM Sj Andre [K94 01] Bleeding from colostomy ([address] )     2/11/2023  2:06 AM Sj Andre [E87 6] Hypokalemia       ED Disposition     ED Disposition   Admit    Condition   Stable    Date/Time   Sat Feb 11, 2023  2:06 AM    Comment   Case was discussed with SOFIYA and the patient's admission status was agreed to be observation to the service of Dr Linus Smith              Follow-up Information    None         Current Discharge Medication List      CONTINUE these medications which have NOT CHANGED    Details   CAPECITABINE PO Take 1,650 mg by mouth 2 (two) times a day      POTASSIUM PO Take by mouth      !! predniSONE 10 mg tablet Take 10 mg by mouth daily at bedtime      !! predniSONE 20 mg tablet Take 20 mg by mouth in the morning Takes in am      tamsulosin (FLOMAX) 0 4 mg Take 1 capsule (0 4 mg total) by mouth daily at bedtime  Qty: 15 capsule, Refills: 0    Associated Diagnoses: Hydronephrosis, bilateral      acetaminophen (TYLENOL) 325 mg tablet Take 650 mg by mouth every 6 (six) hours as needed for mild pain      cyanocobalamin (VITAMIN B-12) 1000 MCG tablet Take 1,000 mcg by mouth daily      diclofenac sodium (VOLTAREN) 50 mg EC tablet Take 1 tablet (50 mg total) by mouth 3 (three) times a day for 7 days  Qty: 21 tablet, Refills: 0    Associated Diagnoses: Bilateral hydronephrosis      docusate sodium (COLACE) 100 mg capsule Take 1 capsule (100 mg total) by mouth 2 (two) times a day for 15 days  Qty: 30 capsule, Refills: 0    Associated Diagnoses: Bilateral hydronephrosis      oxybutynin (DITROPAN) 5 mg tablet Take 1 tablet (5 mg total) by mouth 3 (three) times a day  Qty: 15 tablet, Refills: 0    Associated Diagnoses: Right flank pain; Hydronephrosis, bilateral       !! - Potential duplicate medications found  Please discuss with provider  No discharge procedures on file  PDMP Review       Value Time User    PDMP Reviewed  Yes 2/11/2023  3:39 AM Nan Tong DO           ED Provider  Attending physically available and evaluated Pio Tiradooliver  I managed the patient along with the ED Attending      Electronically Signed by         Radha Pike DO  02/11/23 1939

## 2023-02-11 NOTE — H&P
[facility]  H&P- Balta Reid 1965, 56 y o  female MRN: [id number]  Unit/Bed#: S -01 Encounter: [id number]  Primary Care Provider: No primary care provider on file  Date and time admitted to hospital: 2/10/2023  9:40 PM    * Bleeding from colostomy [facility])  Assessment & Plan  POA: This is a 55 yo female with pmh rectal cancer since 12/2022 s/p colostomy and currently on chemotherapy, sarcoidosis, HTN who presents with one day of bright red bleeding from her colostomy bag  The bleeding is painless  She also reports history of small amounts of blood but states that this bleeding filled her colostomy bag a quarter of the way  She did not notice any bloody stool in the days prior to this episode  She has history of recurrent C diff Colitis  Blood was bright read in bag on arrival; Now colostomy has areas of brownish blood, no bright red blood  Not currently on [facility]  Most recent Colonoscopy, MRI AP, MRI pelvis 10/28 at [facility]:   "- Malignant partially obstructing tumor in the recto-sigmoid colon  Biopsied  Tattooed  - One 4 mm polyp in the descending colon  - One 10 mm polyp in the cecum   - The distal rectum and anal verge are normal on retroflexion view  MRI AP: 5 3 cm rectal mass involving the mid to upper rectum as described above  The mass abuts the posterior wall of the cervix with questionable early involvement of the cervical serosa as described above  MRI Pelvis: IMPRESSION:  Stage: T [3 c] N[0]  CRM: Clear  Sphincter involvement: No  Suspicious extra mesorectal lymph nodes: No"    Blood Pressure: 156/74    Recent Labs     02/10/23  2220   HGB 12 9     CT abdomen pelvis with contrast    Result Date: 2/11/2023  Impression: Interval decrease in right-sided hydronephrosis  Thickening of the ascending colon and cecum which may represent colitis  Follow-up with gastroenterology  Stable 1 cm right adrenal gland nodule  Although its imaging features are indeterminate, it does not have suspicious imaging features (heterogeneity, necrosis, irregular margins), therefore this is likely benign, and can be followed by non-contrast abdomen CT or MRI in 12 months  If patient has history of adrenal hyperfunction, consider biochemical evaluation  Bleeding likely secondary to colitis seen on CT  Possibly inflammatory vs infectious  There could also be a UGI bleed however with current CT findings, this is less likely  Plan:  · Protonix IV 40mg BID  · Monitor colostomy for further bleeding  · H&H q 8 hours  · Consult gastroenterology, NPO until GI eval  · Transfuse if Hb < 7, Type and screen  · Hold AC  · Monitor VS  · Check stool panel, C diff  As patient has history of recurrent C diff infections  CKD (chronic kidney disease)  Assessment & Plan  Lab Results   Component Value Date    EGFR 102 02/10/2023    EGFR 98 01/27/2023    EGFR 87 01/26/2023    CREATININE 0 61 02/10/2023    CREATININE 0 68 01/27/2023    CREATININE 0 77 01/26/2023     Creatinine currently stable  IVF gentle hydration due to NPO  Monitor CMP    Adrenal nodule [facility])  Assessment & Plan  CT abdomen pelvis with contrast    Result Date: 2/11/2023  Impression: Stable 1 cm right adrenal gland nodule  Although its imaging features are indeterminate, it does not have suspicious imaging features (heterogeneity, necrosis, irregular margins), therefore this is likely benign, and can be followed by non-contrast abdomen CT or MRI in 12 months  If patient has history of adrenal hyperfunction, consider biochemical evaluation  Adrenal nodule is again noted on CT  Pt should have follow CT or MRI in 12 months as recommended  Monitor for acute changes in symptoms      Transaminitis  Assessment & Plan  Possibly secondary to hepatic steatosis, hemochromatosis history  Monitor CMP  GI is consulted    Hypokalemia  Assessment & Plan  Lab Results   Component Value Date    K 2 7 (LL) 02/10/2023    K 4 4 01/27/2023    K 2 9 (L) 01/26/2023       Pt has chronic hypokalemia and takes 10mg potassium chloride daily at home  POA: K+ found to be 2 7 which was repleted  Monitor on telemetry  Pt prefers oral potassium if possible due to severe burning pain with IV potassium    Sarcoidosis  Assessment & Plan  Pt has history of sarcoidosis and follows with [facility] for treatment; has been stable    Continue prednisone 30mg daily; pt reports she takes both her 10mg and 20mg doses in the AM    Primary hypertension  Assessment & Plan  BP Readings from Last 3 Encounters:   02/11/23 165/94   01/27/23 137/77   01/23/23 162/78     Pt does not currently take medications to control BP  Previously was on amlodipine in 2022  Monitor BP while admitted  Consider hydralazine 10mg q6h prn SBP >180    History of Clostridium difficile colitis  Assessment & Plan  Pt with history of recurrent C Diff  Presents with signs of colitis on CT and change in stool in stoma with blood in colostomy bag  Plan:   Check C diff toxin  Contact and hand hygiene isolation   Monitor stool output    Malignant neoplasm of sigmoid colon [facility])  Assessment & Plan  Pt has active colorectal cancer and currently takes capecitabine 1650mg twice daily  She is seen at [facility] in [place], [place] by Dr Mckenzie Salcido for treatment  S/P colostomy    Plan:   Continue capecitabine BID      VTE Prophylaxis: Pharmacologic VTE Prophylaxis contraindicated due to bleeding from stoma  / sequential compression device   Code Status: Level 1  POLST: POLST form is not discussed and not completed at this time  Anticipated Length of Stay:  Patient will be admitted on an Observation basis with an anticipated length of stay of  < 2 midnights     Justification for Hospital Stay: bleeding from colostomy    Chief Complaint:   There is blood in my colostomy bag    History of Present Illness:    Lila Hackett is a 56 y o  female with colorectal active cancer s/p colostomy who presents with one day of blood in her colostomy bag  She states that since having her colostomy, she has noted small amounts of blood at times but not as much as today  In the last few days, she has not seen any blood in the bag and it first started on 2/10 in AM  It was bright red and filled about a quarter of her bag  She also notes that she had stool in the bag in the AM and typically, she does not stool in the AM  She denies any abdominal pain  She has not had any other symptoms that she can recall  She states that in the past she did have an episode with bleeding but it was associated with significant abdominal pain and she had a colon infection at that time  She reports previous recurrent C dif as well whenever she takes antibiotics and if she requires antibiotics, she is often given prophylactic medications for C diff  She has not recently taken antibiotics and denies recent changes to her medication regimen  Review of Systems:    Review of Systems   Constitutional: Negative for chills and fever  HENT: Negative for congestion and sore throat  Eyes: Negative for pain and visual disturbance  Respiratory: Negative for chest tightness and shortness of breath  Cardiovascular: Negative for chest pain and palpitations  Gastrointestinal: Positive for blood in stool  Negative for abdominal pain, diarrhea, nausea and vomiting  Genitourinary: Negative for dysuria and hematuria  Musculoskeletal: Negative for arthralgias and back pain  Skin: Negative for color change and rash  Neurological: Negative for seizures and syncope  Psychiatric/Behavioral: Negative for sleep disturbance         Past Medical and Surgical History:     Past Medical History:   Diagnosis Date   • Colon cancer [facility])    • Fall    • Headache    • Hemochromatosis, unspecified    • History of transfusion     2022 - no adverse reaction   • Kidney calculi    • Kidney stone    • Liver disease     hemangioma   • Muscle weakness    • Sarcoidosis    • Seizures ([address] ) 2022       Past Surgical History:   Procedure Laterality Date   • ABSCESS DRAINAGE      left breast   • CHEST TUBE INSERTION     • COLON SURGERY      colostomy   • COLONOSCOPY     • FL RETROGRADE PYELOGRAM  1/23/2023   • LUNG BIOPSY     • TN CYSTO/URETERO W/LITHOTRIPSY &INDWELL STENT INSRT Bilateral 1/23/2023    Procedure: CYSTOSCOPY  RIGHT URETEROSCOPY WITH LITHOTRIPSY HOLMIUM LASER, BILATERAL RETROGRADE PYELOGRAM AND BILATERAL  URETERAL STENT INSERTION;  Surgeon: Renetta Mcdaniels MD;  Location: [facility];  Service: Urology   • TONSILLECTOMY     • URINARY SURGERY Bilateral     bilateral stents       Meds/Allergies:    Prior to Admission medications    Medication Sig Start Date End Date Taking?  Authorizing Provider   CAPECITABINE PO Take 1,650 mg by mouth 2 (two) times a day   Yes Historical Provider, MD   POTASSIUM PO Take by mouth   Yes Historical Provider, MD   predniSONE 10 mg tablet Take 10 mg by mouth daily at bedtime   Yes Historical Provider, MD   predniSONE 20 mg tablet Take 20 mg by mouth in the morning Takes in am   Yes Historical Provider, MD   tamsulosin (FLOMAX) 0 4 mg Take 1 capsule (0 4 mg total) by mouth daily at bedtime 1/27/23  Yes Halina Weathers MD   acetaminophen (TYLENOL) 325 mg tablet Take 650 mg by mouth every 6 (six) hours as needed for mild pain    Historical Provider, MD   cyanocobalamin (VITAMIN B-12) 1000 MCG tablet Take 1,000 mcg by mouth daily    Historical Provider, MD   diclofenac sodium (VOLTAREN) 50 mg EC tablet Take 1 tablet (50 mg total) by mouth 3 (three) times a day for 7 days 1/23/23 1/30/23  Renetta Mcdaniels MD   docusate sodium (COLACE) 100 mg capsule Take 1 capsule (100 mg total) by mouth 2 (two) times a day for 15 days 1/23/23 2/7/23  Renetta Mcdaniels MD   oxybutynin (DITROPAN) 5 mg tablet Take 1 tablet (5 mg total) by mouth 3 (three) times a day  Patient not taking: Reported on 2/11/2023 1/27/23   Halina Weathers MD     I have reviewed home medications with patient personally  Allergies: No Known Allergies    Social History:     Marital Status: Single   Occupation: not employed  Patient Pre-hospital Living Situation: home  Patient Pre-hospital Level of Mobility: walks but does note that she moves more slowly; PT eval was offered and declined  Patient Pre-hospital Diet Restrictions: none  Substance Use History:   Social History     Substance and Sexual Activity   Alcohol Use Never     Social History     Tobacco Use   Smoking Status Never   • Passive exposure: Past   Smokeless Tobacco Never     Social History     Substance and Sexual Activity   Drug Use Never       Family History:    History reviewed  No pertinent family history  Physical Exam:     Vitals:   Blood Pressure: 165/94 (02/11/23 0327)  Pulse: 89 (02/11/23 0327)  Temperature: 97 8 °F (36 6 °C) (02/11/23 0327)  Temp Source: Oral (02/10/23 2205)  Respirations: 18 (02/10/23 2144)  Weight - Scale: 65 4 kg (144 lb 2 9 oz) (02/10/23 2206)  SpO2: 99 % (02/11/23 0327)    Physical Exam  Vitals reviewed  Constitutional:       General: She is awake  She is not in acute distress  Appearance: Normal appearance  She is not diaphoretic  HENT:      Head: Normocephalic and atraumatic  Mouth/Throat:      Mouth: Mucous membranes are moist       Pharynx: Oropharynx is clear  Eyes:      General: No scleral icterus  Pupils: Pupils are equal, round, and reactive to light  Cardiovascular:      Rate and Rhythm: Normal rate and regular rhythm  Pulses: Normal pulses  Heart sounds: Normal heart sounds  No murmur heard  Pulmonary:      Effort: Pulmonary effort is normal  No respiratory distress  Breath sounds: No rhonchi or rales  Abdominal:      General: Abdomen is protuberant  Bowel sounds are normal  There is distension  Palpations: Abdomen is soft  Tenderness: There is no abdominal tenderness  There is no guarding or rebound         Musculoskeletal: General: Normal range of motion  Right lower leg: No edema  Left lower leg: No edema  Skin:     General: Skin is warm and dry  Capillary Refill: Capillary refill takes less than 2 seconds  Coloration: Skin is not jaundiced or pale  Neurological:      Mental Status: She is alert and oriented to person, place, and time  Cranial Nerves: No cranial nerve deficit  Psychiatric:         Attention and Perception: Attention and perception normal          Mood and Affect: Mood and affect normal          Behavior: Behavior normal  Behavior is cooperative  Additional Data:     Lab Results: I have personally reviewed pertinent reports  Results from last 7 days   Lab Units 02/10/23  2220   WBC Thousand/uL 9 57   HEMOGLOBIN g/dL 12 9   HEMATOCRIT % 38 1   PLATELETS Thousands/uL 144*   NEUTROS PCT % 88*   LYMPHS PCT % 7*   MONOS PCT % 4   EOS PCT % 0     Results from last 7 days   Lab Units 02/10/23  2220   POTASSIUM mmol/L 2 7*   CHLORIDE mmol/L 104   CO2 mmol/L 28   BUN mg/dL 12   CREATININE mg/dL 0 61   CALCIUM mg/dL 8 9   ALK PHOS U/L 426*   ALT U/L 85*   AST U/L 47*     Results from last 7 days   Lab Units 02/10/23  2220   INR  0 99       Imaging: I have personally reviewed pertinent reports  FL retrograde pyelogram    Result Date: 1/27/2023  Narrative: BILATERAL RETROGRADE PYELOGRAM INDICATION:   Bilateral ureteral calculi [N20 1]  COMPARISON: Abdomen and pelvic CT from 1/6/2023  IMAGES:  32 FLUOROSCOPY TIME:   5 minutes 48 seconds CONTRAST: 30 mL of iohexol (OMNIPAQUE) FINDINGS: Fluoroscopic guidance provided for retrograde pyelogram  Images provided demonstrate performance of right retrograde pyelogram, right ureteroscopy, with right ureteral stent placement  Mild hydroureteronephrosis noted  A small amount of extravasated contrast is seen around the right proximal ureter  Osseous  and soft tissue detail limited by technique   Images provided also demonstrate performance of left retrograde pyelogram with left ureteral stent placement  Impression: Fluoroscopic guidance provided for bilateral retrograde pyelogram  Please see procedure report for further details  Workstation performed: [id number]     CT abdomen pelvis with contrast    Result Date: 2/11/2023  Narrative: CT ABDOMEN AND PELVIS WITH IV CONTRAST INDICATION:   Bleeding from colostomy bag, investigating for fistula, complication related to known colon cancer    COMPARISON:  1/26/2023  TECHNIQUE:  CT examination of the abdomen and pelvis was performed  Axial, sagittal, and coronal 2D reformatted images were created from the source data and submitted for interpretation  Radiation dose length product (DLP) for this visit:  260 mGy-cm   This examination, like all CT scans performed in the [facility], was performed utilizing techniques to minimize radiation dose exposure, including the use of iterative reconstruction and automated exposure control  IV Contrast:  100 mL of iohexol (OMNIPAQUE) Enteric Contrast:  Enteric contrast was not administered  FINDINGS: ABDOMEN LOWER CHEST:  Atelectatic changes are noted at the lung bases  LIVER/BILIARY TREE:  Unremarkable  GALLBLADDER:  No calcified gallstones  No pericholecystic inflammatory change  SPLEEN:  Unremarkable  PANCREAS:  Unremarkable  ADRENAL GLANDS:  Stable 1 cm nodule in the lateral limb of the right adrenal gland  KIDNEYS/URETERS:  Bilateral nephroureteral stents similar to prior study  Mild right-sided hydronephrosis is seen  Nonobstructing bilateral punctate intrarenal calculi  STOMACH AND BOWEL:  Left-sided colostomy is seen  Diffuse thickening of the ascending colon and cecum is seen  APPENDIX:  No findings to suggest appendicitis  ABDOMINOPELVIC CAVITY: Free fluid in the abdomen and pelvis is seen  VESSELS:  Unremarkable for patient's age  PELVIS REPRODUCTIVE ORGANS:  Unremarkable for patient's age   URINARY BLADDER:  TIPS of the nephroureteral stents are seen in the urinary bladder    ABDOMINAL WALL/INGUINAL REGIONS:  Fat-containing right inguinal hernia  Small fat-containing umbilical wall hernia    OSSEOUS STRUCTURES:  No acute fracture or osseous destructive lesion identified  Degenerative changes of the spine  Transitional lumbosacral vertebra attributed to sacralized L5 vertebral body  Impression: Interval decrease in right-sided hydronephrosis  Thickening of the ascending colon and cecum which may represent colitis  Follow-up with gastroenterology  Stable 1 cm right adrenal gland nodule  Although its imaging features are indeterminate, it does not have suspicious imaging features (heterogeneity, necrosis, irregular margins), therefore this is likely benign, and can be followed by non-contrast abdomen CT or MRI in 12 months  If patient has history of adrenal hyperfunction, consider biochemical evaluation  The study was marked in [facility] for immediate notification  Workstation performed: [id number]     CT abdomen pelvis with contrast    Result Date: 1/26/2023  Narrative: CT ABDOMEN AND PELVIS WITH IV CONTRAST INDICATION:   Abdominal pain, acute, nonlocalized left sided abdominal/flank pain  COMPARISON:  CT abdomen and pelvis 1/6/2023 TECHNIQUE:  CT examination of the abdomen and pelvis was performed  In addition to portal venous phase postcontrast scanning through the abdomen and pelvis, delayed phase postcontrast scanning was performed through the upper abdominal viscera  Axial, sagittal, and coronal 2D reformatted images were created from the source data and submitted for interpretation  Radiation dose length product (DLP) for this visit:  638 mGy-cm   This examination, like all CT scans performed in the [facility], was performed utilizing techniques to minimize radiation dose exposure, including the use of iterative reconstruction and automated exposure control   IV Contrast:  100 mL of iohexol (OMNIPAQUE)  350 Enteric Contrast: Enteric contrast was not administered  FINDINGS: ABDOMEN LOWER CHEST:  Minimal left lower lobe juxtapleural platelike scarring  Tiny sliding hiatal hernia  Stable 6 mm inferior left breast superficial nodule image 22 series 301  LIVER/BILIARY TREE:  Liver is diffusely decreased in density consistent with fatty change  No CT evidence of suspicious hepatic mass  Normal hepatic contours  No biliary dilatation  GALLBLADDER:  No calcified gallstones  Trace gallbladder wall thickening again visualized  SPLEEN:  Unremarkable  PANCREAS:  Unremarkable  ADRENAL GLANDS:  Stable 1 cm nodule in the lateral limb of the right adrenal gland  Unremarkable left adrenal gland  KIDNEYS/URETERS:  Bilateral nephroureteral stents in place with proximal right pigtail in the renal pelvis and proximal left pigtail in the upper pole calyx  Moderate right hydronephrosis similar to prior study  Minimal right proximal ureteral calcification beginning at the UPJ extending over a length of 2 5 cm; decreased calcification since the prior study  New right renal lower pole calculi, 1 3 cm and smaller  Minimal right renal pelvic urothelial thickening and hyperemia has increased with new trace parapelvic stranding  No perinephric collection  Trace residual albeit markedly improved left hydronephrosis  Minimal fat stranding adjacent to the left renal pelvis and proximal ureter with decreased urothelial hyperemia  Trace new renal lower pole calyceal hyperemia likely sequela of recent procedure  Stable calcifications in the proximal left ureter, 3 mm and smaller prior punctate calcification in the distal ureter is no longer present  Left renal calculi, 4 mm and smaller  No perinephric collection  STOMACH AND BOWEL:  Left ventral mid abdominal colostomy  Nondistended small bowel and colon limits the evaluation  No bowel obstruction  APPENDIX:  No findings to suggest appendicitis  ABDOMINOPELVIC CAVITY:  No ascites  No pneumoperitoneum    No lymphadenopathy  VESSELS:  Aortoiliac calcification  No aneurysm  PELVIS REPRODUCTIVE ORGANS:  Unremarkable for patient's age  URINARY BLADDER:  Distal pigtails of bilateral nephroureteral stents in the bladder lumen  Trace gas in the bladder lumen attributed to recent procedure  ABDOMINAL WALL/INGUINAL REGIONS:  Small fat-containing umbilical hernia  Tiny fat-containing right inguinal hernia OSSEOUS STRUCTURES:  No acute fracture or osseous destructive lesion identified  Degenerative changes of the spine  Transitional lumbosacral vertebra attributed to sacralized L5 vertebral body  Impression: Moderate right hydronephrosis with right nephroureteral stent in place similar to prior study  Proximal right ureteral calcification has decreased with new right renal lower pole stone fragments, 1 3 cm and smaller attributed to post lithotripsy retrograde migration  Trace residual albeit markedly improved left hydronephrosis with left nephroureteral stent in place  Stable proximal left ureteral calculi, 3 mm and smaller  Punctate distal left ureteral calculus present previously is no longer visualized  Stable left renal calculi, 4 mm and smaller  Mildly progressive right renal pyelitis in the left renal lower pole calyceal hyperemia may be residual inflammation from recent procedure  Correlate with UA to exclude UTI  No perinephric collection  Stable minimal gallbladder wall thickening without calcified gallstones  This may be sequela of chronic hepatocellular disease, partially contracted gallbladder or nonspecific chronic inflammation  This could be followed up with nonemergent gallbladder ultrasound  Stable 1 cm right adrenal nodule  Although its imaging features are indeterminate, it does not have suspicious imaging features (heterogeneity, necrosis, irregular margins), therefore this is likely benign, and can be followed by non-contrast abdomen CT or MRI in 12 months    If patient has history of adrenal hyperfunction, consider biochemical evaluation  Adrenal recommendation based on institutional consensus and [employer] of [employer] of Radiology 2017;14:[id number] This study demonstrates a significant  finding and was documented as such in Epic for liaison and referring practitioner notification  This study demonstrates a significant  finding and was documented as such in Epic for liaison and referring practitioner notification  Workstation performed: [id number]       EKG, Pathology, and Other Studies Reviewed on Admission:   · EKG: regular sinus rhythm, PACs noted    Epic / Care Everywhere Records Reviewed: Yes     ** Please Note: This note has been constructed using a voice recognition system   **

## 2023-02-11 NOTE — ASSESSMENT & PLAN NOTE
POA: This is a 55 yo female with pmh rectal cancer since 12/2022 s/p colostomy and currently on chemotherapy, sarcoidosis, HTN who presents with one day of bright red bleeding from her colostomy bag  The bleeding is painless  She also reports history of small amounts of blood but states that this bleeding filled her colostomy bag a quarter of the way  She did not notice any bloody stool in the days prior to this episode  She has history of recurrent C diff Colitis  Blood was bright read in bag on arrival; Now colostomy has areas of brownish blood, no bright red blood  Not currently on [facility]  Most recent Colonoscopy, MRI AP, MRI pelvis 10/28 at [facility] [employer] [facility]:   "- Malignant partially obstructing tumor in the recto-sigmoid colon  Biopsied  Tattooed  - One 4 mm polyp in the descending colon  - One 10 mm polyp in the cecum   - The distal rectum and anal verge are normal on retroflexion view  MRI AP: 5 3 cm rectal mass involving the mid to upper rectum as described above  The mass abuts the posterior wall of the cervix with questionable early involvement of the cervical serosa as described above  MRI Pelvis: IMPRESSION:  Stage: T [3 c] N[0]  CRM: Clear  Sphincter involvement: No  Suspicious extra mesorectal lymph nodes: No"    Blood Pressure: 156/74    Recent Labs     02/10/23  2220   HGB 12 9     CT abdomen pelvis with contrast    Result Date: 2/11/2023  Impression: Interval decrease in right-sided hydronephrosis  Thickening of the ascending colon and cecum which may represent colitis  Follow-up with gastroenterology  Stable 1 cm right adrenal gland nodule  Although its imaging features are indeterminate, it does not have suspicious imaging features (heterogeneity, necrosis, irregular margins), therefore this is likely benign, and can be followed by non-contrast abdomen CT or MRI in 12 months  If patient has history of adrenal hyperfunction, consider biochemical evaluation       Bleeding likely secondary to colitis seen on CT  Possibly inflammatory vs infectious  There could also be a UGI bleed however with current CT findings, this is less likely  Plan:  · Protonix IV 40mg BID  · Monitor colostomy for further bleeding  · H&H q 8 hours  · Consult gastroenterology, NPO until GI eval  · Transfuse if Hb < 7, Type and screen  · Hold AC  · Monitor VS  · Check stool panel, C diff  As patient has history of recurrent C diff infections

## 2023-02-12 LAB
ANION GAP SERPL CALCULATED.3IONS-SCNC: 6 MMOL/L (ref 4–13)
ATRIAL RATE: 72 BPM
BUN SERPL-MCNC: 8 MG/DL (ref 5–25)
C DIFF TOX A+B STL QL IA: POSITIVE
C DIFF TOX GENS STL QL NAA+PROBE: POSITIVE
CALCIUM SERPL-MCNC: 8.3 MG/DL (ref 8.4–10.2)
CAMPYLOBACTER DNA SPEC NAA+PROBE: NORMAL
CHLORIDE SERPL-SCNC: 111 MMOL/L (ref 96–108)
CO2 SERPL-SCNC: 23 MMOL/L (ref 21–32)
CREAT SERPL-MCNC: 0.53 MG/DL (ref 0.6–1.3)
ERYTHROCYTE [DISTWIDTH] IN BLOOD BY AUTOMATED COUNT: 17.5 % (ref 11.6–15.1)
GFR SERPL CREATININE-BSD FRML MDRD: 107 ML/MIN/1.73SQ M
GLUCOSE SERPL-MCNC: 93 MG/DL (ref 65–140)
HCT VFR BLD AUTO: 32 % (ref 34.8–46.1)
HGB BLD-MCNC: 10.1 G/DL (ref 11.5–15.4)
HGB BLD-MCNC: 10.5 G/DL (ref 11.5–15.4)
MCH RBC QN AUTO: 33.5 PG (ref 26.8–34.3)
MCHC RBC AUTO-ENTMCNC: 32.8 G/DL (ref 31.4–37.4)
MCV RBC AUTO: 102 FL (ref 82–98)
P AXIS: 47 DEGREES
PLATELET # BLD AUTO: 92 THOUSANDS/UL (ref 149–390)
PMV BLD AUTO: 9.8 FL (ref 8.9–12.7)
POTASSIUM SERPL-SCNC: 3.3 MMOL/L (ref 3.5–5.3)
PR INTERVAL: 200 MS
QRS AXIS: 18 DEGREES
QRSD INTERVAL: 74 MS
QT INTERVAL: 386 MS
QTC INTERVAL: 422 MS
RBC # BLD AUTO: 3.13 MILLION/UL (ref 3.81–5.12)
SALMONELLA DNA SPEC QL NAA+PROBE: NORMAL
SHIGA TOXIN STX GENE SPEC NAA+PROBE: NORMAL
SHIGELLA DNA SPEC QL NAA+PROBE: NORMAL
SODIUM SERPL-SCNC: 140 MMOL/L (ref 135–147)
T WAVE AXIS: 64 DEGREES
VENTRICULAR RATE: 72 BPM
WBC # BLD AUTO: 5.36 THOUSAND/UL (ref 4.31–10.16)

## 2023-02-12 RX ORDER — POTASSIUM CHLORIDE 20 MEQ/1
40 TABLET, EXTENDED RELEASE ORAL ONCE
Status: COMPLETED | OUTPATIENT
Start: 2023-02-12 | End: 2023-02-12

## 2023-02-12 RX ORDER — VANCOMYCIN HYDROCHLORIDE 125 MG/1
125 CAPSULE ORAL EVERY 6 HOURS SCHEDULED
Status: DISCONTINUED | OUTPATIENT
Start: 2023-02-12 | End: 2023-02-14 | Stop reason: HOSPADM

## 2023-02-12 RX ADMIN — VANCOMYCIN HYDROCHLORIDE 125 MG: 125 CAPSULE ORAL at 21:16

## 2023-02-12 RX ADMIN — PANTOPRAZOLE SODIUM 40 MG: 40 INJECTION, POWDER, FOR SOLUTION INTRAVENOUS at 21:16

## 2023-02-12 RX ADMIN — PREDNISONE 20 MG: 20 TABLET ORAL at 09:05

## 2023-02-12 RX ADMIN — PREDNISONE 10 MG: 20 TABLET ORAL at 09:05

## 2023-02-12 RX ADMIN — DEXTROSE, SODIUM CHLORIDE, AND POTASSIUM CHLORIDE 75 ML/HR: 5; .45; .3 INJECTION INTRAVENOUS at 16:11

## 2023-02-12 RX ADMIN — DEXTROSE, SODIUM CHLORIDE, AND POTASSIUM CHLORIDE 75 ML/HR: 5; .45; .3 INJECTION INTRAVENOUS at 02:24

## 2023-02-12 RX ADMIN — CYANOCOBALAMIN TAB 500 MCG 1000 MCG: 500 TAB at 09:05

## 2023-02-12 RX ADMIN — VANCOMYCIN HYDROCHLORIDE 125 MG: 125 CAPSULE ORAL at 16:11

## 2023-02-12 RX ADMIN — CAPECITABINE 1650 MG: 500 TABLET, FILM COATED ORAL at 09:06

## 2023-02-12 RX ADMIN — CAPECITABINE 1650 MG: 500 TABLET, FILM COATED ORAL at 19:28

## 2023-02-12 RX ADMIN — POTASSIUM CHLORIDE 40 MEQ: 1500 TABLET, EXTENDED RELEASE ORAL at 09:05

## 2023-02-12 RX ADMIN — PANTOPRAZOLE SODIUM 40 MG: 40 INJECTION, POWDER, FOR SOLUTION INTRAVENOUS at 09:06

## 2023-02-12 NOTE — PROGRESS NOTES
Progress Note - Liz Howard 56 y o  female MRN: [id number]    Unit/Bed#: S -01 Encounter: [id number]        Assessment/Plan:    Bleeding from colostomy-  This is a 53-year-old female with past medical history of rectal cancer diagnosed in December of 2022 s/p colostomy and currently on chemotherapy, sarcoidosis, HTN who presents with one day of bright red bleeding from her colostomy bag  The bleeding is painless  She also reports history of small amounts of blood but states that this bleeding filled her colostomy bag a quarter of the wayShe has history of recurrent C diff Colitis  No bloody stools were noted at time of examination  [facility]  Most recent Colonoscopy, MRI AP, MRI pelvis 10/28 at [facility]:   "- Malignant partially obstructing tumor in the recto-sigmoid colon  Biopsied  Tattooed  - One 4 mm polyp in the descending colon  - One 10 mm polyp in the cecum   - The distal rectum and anal verge are normal on retroflexion view    Recent MRI AP: 5 3 cm rectal mass involving the mid to upper rectum as described above  The mass abuts the posterior wall of the cervix with questionable early involvement of the cervical serosa as described above  MRI Pelvis: IMPRESSION:  Stage: T [3 c] N[0]  CRM: Clear  Sphincter involvement: No  Suspicious extra mesorectal lymph nodes: No     Patient on chemotherapy currently  Noted to have positive C  difficile which is recurrent issue and will start on vancomycin will need prolonged taper upon discharge    CT on admission showing thickening of the ascending colon and cecum which may represent colitis  -Diet advanced as tolerated to low residue lactose restricted      Subjective:   Patient is lying in bed and she is not having any further bleeding  Patient is still having loose brown stool via ostomy  Hemglobin remained stable at 10 5  Patient otherwise reports that she is just frustrated due to recurrent C  Difficile      Objective:     Vitals: BP 139/70   Pulse 78   Temp 98 2 °F (36 8 °C)   Resp 16   Wt 65 4 kg (144 lb 2 9 oz)   SpO2 98%   BMI 26 37 kg/m²       Physical Exam:  Gen-alert acute distress  Abd-soft positive bowel sounds nontender nondistended left lower quadrant colostomy with liquid brown stool       Lab, Imaging and other studies:   Recent Results (from the past 72 hour(s))   Green / Yellow tube on hold    Collection Time: 02/10/23 10:20 PM   Result Value Ref Range    Extra Tube Hold for add-ons  Wilnette Hair / Black tube on hold    Collection Time: 02/10/23 10:20 PM   Result Value Ref Range    Extra Tube Hold for add-ons  Lavender Top 3 ml on hold    Collection Time: 02/10/23 10:20 PM   Result Value Ref Range    Extra Tube Hold for add-ons      CBC and differential    Collection Time: 02/10/23 10:20 PM   Result Value Ref Range    WBC 9 57 4 31 - 10 16 Thousand/uL    RBC 3 77 (L) 3 81 - 5 12 Million/uL    Hemoglobin 12 9 11 5 - 15 4 g/dL    Hematocrit 38 1 34 8 - 46 1 %     (H) 82 - 98 fL    MCH 34 2 26 8 - 34 3 pg    MCHC 33 9 31 4 - 37 4 g/dL    RDW 17 8 (H) 11 6 - 15 1 %    MPV 9 9 8 9 - 12 7 fL    Platelets 578 (L) 380 - 390 Thousands/uL    nRBC 0 /100 WBCs    Neutrophils Relative 88 (H) 43 - 75 %    Immat GRANS % 1 0 - 2 %    Lymphocytes Relative 7 (L) 14 - 44 %    Monocytes Relative 4 4 - 12 %    Eosinophils Relative 0 0 - 6 %    Basophils Relative 0 0 - 1 %    Neutrophils Absolute 8 54 (H) 1 85 - 7 62 Thousands/µL    Immature Grans Absolute 0 06 0 00 - 0 20 Thousand/uL    Lymphocytes Absolute 0 62 0 60 - 4 47 Thousands/µL    Monocytes Absolute 0 34 0 17 - 1 22 Thousand/µL    Eosinophils Absolute 0 00 0 00 - 0 61 Thousand/µL    Basophils Absolute 0 01 0 00 - 0 10 Thousands/µL   Protime-INR    Collection Time: 02/10/23 10:20 PM   Result Value Ref Range    Protime 13 3 11 6 - 14 5 seconds    INR 0 99 0 84 - 1 19   APTT    Collection Time: 02/10/23 10:20 PM   Result Value Ref Range    PTT 25 23 - 37 seconds   Comprehensive metabolic panel    Collection Time: 02/10/23 10:20 PM   Result Value Ref Range    Sodium 140 135 - 147 mmol/L    Potassium 2 7 (LL) 3 5 - 5 3 mmol/L    Chloride 104 96 - 108 mmol/L    CO2 28 21 - 32 mmol/L    ANION GAP 8 4 - 13 mmol/L    BUN 12 5 - 25 mg/dL    Creatinine 0 61 0 60 - 1 30 mg/dL    Glucose 166 (H) 65 - 140 mg/dL    Calcium 8 9 8 4 - 10 2 mg/dL    Corrected Calcium 9 5 8 3 - 10 1 mg/dL    AST 47 (H) 13 - 39 U/L    ALT 85 (H) 7 - 52 U/L    Alkaline Phosphatase 426 (H) 34 - 104 U/L    Total Protein 5 7 (L) 6 4 - 8 4 g/dL    Albumin 3 2 (L) 3 5 - 5 0 g/dL    Total Bilirubin 1 61 (H) 0 20 - 1 00 mg/dL    eGFR 102 ml/min/1 73sq m   Type and screen    Collection Time: 02/10/23 11:55 PM   Result Value Ref Range    ABO Grouping A     Rh Factor Positive     Antibody Screen Negative     Specimen Expiration Date [id number]    ECG 12 lead    Collection Time: 02/11/23  1:27 AM   Result Value Ref Range    Ventricular Rate 72 BPM    Atrial Rate 72 BPM    NM Interval 200 ms    QRSD Interval 74 ms    QT Interval 386 ms    QTC Interval 422 ms    P Arco 47 degrees    QRS Axis 18 degrees    T Wave Axis 64 degrees   CBC and differential    Collection Time: 02/11/23  4:35 AM   Result Value Ref Range    WBC 7 14 4 31 - 10 16 Thousand/uL    RBC 3 27 (L) 3 81 - 5 12 Million/uL    Hemoglobin 10 6 (L) 11 5 - 15 4 g/dL    Hematocrit 32 8 (L) 34 8 - 46 1 %     (H) 82 - 98 fL    MCH 32 4 26 8 - 34 3 pg    MCHC 32 3 31 4 - 37 4 g/dL    RDW 17 3 (H) 11 6 - 15 1 %    MPV 10 0 8 9 - 12 7 fL    Platelets 846 (L) 015 - 390 Thousands/uL    nRBC 0 /100 WBCs    Neutrophils Relative 83 (H) 43 - 75 %    Immat GRANS % 1 0 - 2 %    Lymphocytes Relative 12 (L) 14 - 44 %    Monocytes Relative 4 4 - 12 %    Eosinophils Relative 0 0 - 6 %    Basophils Relative 0 0 - 1 %    Neutrophils Absolute 5 88 1 85 - 7 62 Thousands/µL    Immature Grans Absolute 0 04 0 00 - 0 20 Thousand/uL    Lymphocytes Absolute 0 88 0 60 - 4 47 Thousands/µL    Monocytes Absolute 0 30 0 17 - 1 22 Thousand/µL    Eosinophils Absolute 0 03 0 00 - 0 61 Thousand/µL    Basophils Absolute 0 01 0 00 - 0 10 Thousands/µL   Comprehensive metabolic panel    Collection Time: 02/11/23  4:35 AM   Result Value Ref Range    Sodium 140 135 - 147 mmol/L    Potassium 2 6 (LL) 3 5 - 5 3 mmol/L    Chloride 107 96 - 108 mmol/L    CO2 26 21 - 32 mmol/L    ANION GAP 7 4 - 13 mmol/L    BUN 12 5 - 25 mg/dL    Creatinine 0 48 (L) 0 60 - 1 30 mg/dL    Glucose 92 65 - 140 mg/dL    Calcium 8 1 (L) 8 4 - 10 2 mg/dL    Corrected Calcium 9 2 8 3 - 10 1 mg/dL    AST 38 13 - 39 U/L    ALT 64 (H) 7 - 52 U/L    Alkaline Phosphatase 351 (H) 34 - 104 U/L    Total Protein 4 5 (L) 6 4 - 8 4 g/dL    Albumin 2 6 (L) 3 5 - 5 0 g/dL    Total Bilirubin 1 35 (H) 0 20 - 1 00 mg/dL    eGFR 110 ml/min/1 73sq m   Magnesium    Collection Time: 02/11/23  4:35 AM   Result Value Ref Range    Magnesium 2 0 1 9 - 2 7 mg/dL   Protime-INR    Collection Time: 02/11/23  4:35 AM   Result Value Ref Range    Protime 14 0 11 6 - 14 5 seconds    INR 1 06 0 84 - 1 19   APTT    Collection Time: 02/11/23  4:35 AM   Result Value Ref Range    PTT 25 23 - 37 seconds   Clostridium difficile toxin by PCR with EIA    Collection Time: 02/11/23  5:46 AM    Specimen: Per Stoma; Stool   Result Value Ref Range    C difficile toxin by PCR Positive (A) Negative    C difficile Toxins A+B, EIA Positive (A) Negative   Serial Hemoglobin Q8hrs    Collection Time: 02/11/23  9:31 AM   Result Value Ref Range    Hemoglobin 11 4 (L) 11 5 - 15 4 g/dL   Potassium    Collection Time: 02/11/23  9:31 AM   Result Value Ref Range    Potassium 2 7 (LL) 3 5 - 5 3 mmol/L   Potassium    Collection Time: 02/11/23  8:19 PM   Result Value Ref Range    Potassium 3 7 3 5 - 5 3 mmol/L   Serial Hemoglobin Q8hrs    Collection Time: 02/11/23  8:19 PM   Result Value Ref Range    Hemoglobin 11 1 (L) 11 5 - 15 4 g/dL   Basic metabolic panel    Collection Time: 02/12/23  6:44 AM   Result Value Ref Range    Sodium 140 135 - 147 mmol/L    Potassium 3 3 (L) 3 5 - 5 3 mmol/L    Chloride 111 (H) 96 - 108 mmol/L    CO2 23 21 - 32 mmol/L    ANION GAP 6 4 - 13 mmol/L    BUN 8 5 - 25 mg/dL    Creatinine 0 53 (L) 0 60 - 1 30 mg/dL    Glucose 93 65 - 140 mg/dL    Calcium 8 3 (L) 8 4 - 10 2 mg/dL    eGFR 107 ml/min/1 73sq m   CBC    Collection Time: 02/12/23  6:44 AM   Result Value Ref Range    WBC 5 36 4 31 - 10 16 Thousand/uL    RBC 3 13 (L) 3 81 - 5 12 Million/uL    Hemoglobin 10 5 (L) 11 5 - 15 4 g/dL    Hematocrit 32 0 (L) 34 8 - 46 1 %     (H) 82 - 98 fL    MCH 33 5 26 8 - 34 3 pg    MCHC 32 8 31 4 - 37 4 g/dL    RDW 17 5 (H) 11 6 - 15 1 %    Platelets 92 (L) 647 - 390 Thousands/uL    MPV 9 8 8 9 - 12 7 fL

## 2023-02-12 NOTE — ASSESSMENT & PLAN NOTE
P/w with one day of bright red bleeding from her colostomy bag  The bleeding is painless  She also reports history of small amounts of blood but states that this bleeding filled her colostomy bag a quarter of the way  She did not notice any bloody stool in the days prior to this episode  She has history of recurrent C diff Colitis  Blood was bright read in bag on arrival;   Now colostomy has areas of brownish blood, no bright red blood  Not currently on [facility]  Most recent Colonoscopy, MRI AP, MRI pelvis 10/28 at [facility] [employer]:   "- Malignant partially obstructing tumor in the recto-sigmoid colon  Biopsied  Tattooed  - One 4 mm polyp in the descending colon  - One 10 mm polyp in the cecum   - The distal rectum and anal verge are normal on retroflexion view  MRI AP: 5 3 cm rectal mass involving the mid to upper rectum as described above  The mass abuts the posterior wall of the cervix with questionable early involvement of the cervical serosa as described above  MRI Pelvis: IMPRESSION:  Stage: T [3 c] N[0]  CRM: Clear  Sphincter involvement: No  Suspicious extra mesorectal lymph nodes: No"    Blood Pressure: 139/70    Recent Labs     02/11/23  0931 02/11/23 2019 02/12/23  0644   HGB 11 4* 11 1* 10 5*     CT abdomen pelvis with contrast    Result Date: 2/11/2023  Impression: Interval decrease in right-sided hydronephrosis  Thickening of the ascending colon and cecum which may represent colitis  Follow-up with gastroenterology  Stable 1 cm right adrenal gland nodule  Although its imaging features are indeterminate, it does not have suspicious imaging features (heterogeneity, necrosis, irregular margins), therefore this is likely benign, and can be followed by non-contrast abdomen CT or MRI in 12 months  If patient has history of adrenal hyperfunction, consider biochemical evaluation  Bleeding likely secondary to colitis seen on CT  Possibly inflammatory vs infectious   There could also be a UGI bleed however with current CT findings, this is less likely  Plan:  · Protonix IV 40mg BID  · Monitor colostomy for further bleeding  · H&H q 8 hours  · Consult gastroenterology, recommendations appreciated  · Transfuse if Hb < 7, Type and screen  · Hold DVT prophylaxis  · Monitor VS  · Check stool panel, C diff  As patient has history of recurrent C diff infections   Pending

## 2023-02-12 NOTE — PROGRESS NOTES
[facility]  Progress Note - Flower Clines 1965, 56 y o  female MRN: [id number]  Unit/Bed#: S -01 Encounter: [id number]  Primary Care Provider: No primary care provider on file  Date and time admitted to hospital: 2/10/2023  9:40 PM    * Bleeding from colostomy [facility])  Assessment & Plan  P/w with one day of bright red bleeding from her colostomy bag  The bleeding is painless  She also reports history of small amounts of blood but states that this bleeding filled her colostomy bag a quarter of the way  She did not notice any bloody stool in the days prior to this episode  She has history of recurrent C diff Colitis  Blood was bright read in bag on arrival;   Now colostomy has areas of brownish blood, no bright red blood  Not currently on [facility]  Most recent Colonoscopy, MRI AP, MRI pelvis 10/28 at [facility] [employer]:   "- Malignant partially obstructing tumor in the recto-sigmoid colon  Biopsied  Tattooed  - One 4 mm polyp in the descending colon  - One 10 mm polyp in the cecum   - The distal rectum and anal verge are normal on retroflexion view  MRI AP: 5 3 cm rectal mass involving the mid to upper rectum as described above  The mass abuts the posterior wall of the cervix with questionable early involvement of the cervical serosa as described above  MRI Pelvis: IMPRESSION:  Stage: T [3 c] N[0]  CRM: Clear  Sphincter involvement: No  Suspicious extra mesorectal lymph nodes: No"    Blood Pressure: 139/70    Recent Labs     02/11/23  0931 02/11/23  2019 02/12/23  0644   HGB 11 4* 11 1* 10 5*     CT abdomen pelvis with contrast    Result Date: 2/11/2023  Impression: Interval decrease in right-sided hydronephrosis  Thickening of the ascending colon and cecum which may represent colitis  Follow-up with gastroenterology  Stable 1 cm right adrenal gland nodule  Although its imaging features are indeterminate, it does not have suspicious imaging features (heterogeneity, necrosis, irregular margins), therefore this is likely benign, and can be followed by non-contrast abdomen CT or MRI in 12 months  If patient has history of adrenal hyperfunction, consider biochemical evaluation  Bleeding likely secondary to colitis seen on CT  Possibly inflammatory vs infectious  There could also be a UGI bleed however with current CT findings, this is less likely  Plan:  · Protonix IV 40mg BID  · Monitor colostomy for further bleeding  · H&H q 8 hours  · Consult gastroenterology, recommendations appreciated  · Transfuse if Hb < 7, Type and screen  · Hold DVT prophylaxis  · Monitor VS  · Check stool panel, C diff  As patient has history of recurrent C diff infections  Pending    CKD (chronic kidney disease)  Assessment & Plan  Lab Results   Component Value Date    EGFR 107 02/12/2023    EGFR 110 02/11/2023    EGFR 102 02/10/2023    CREATININE 0 53 (L) 02/12/2023    CREATININE 0 48 (L) 02/11/2023    CREATININE 0 61 02/10/2023     Creatinine currently stable  Non-ulcerogenic diet  Monitor CMP    Adrenal nodule (HCC)  Assessment & Plan  CT abdomen pelvis with contrast    Result Date: 2/11/2023  Impression: Stable 1 cm right adrenal gland nodule  Although its imaging features are indeterminate, it does not have suspicious imaging features (heterogeneity, necrosis, irregular margins), therefore this is likely benign, and can be followed by non-contrast abdomen CT or MRI in 12 months  If patient has history of adrenal hyperfunction, consider biochemical evaluation  Adrenal nodule is again noted on CT  Pt should have follow CT or MRI in 12 months as recommended  Monitor for acute changes in symptoms      Transaminitis  Assessment & Plan  Possibly secondary to hepatic steatosis, hemochromatosis history  Monitor CMP  GI is consulted    Hypokalemia  Assessment & Plan  Lab Results   Component Value Date    K 3 3 (L) 02/12/2023    K 3 7 02/11/2023    K 2 7 (LL) 02/11/2023       Pt has chronic hypokalemia and takes 10mg potassium chloride daily at home  POA: K+ found to be 2 7 which was repleted  Continue monitoring and replenish as needed    Sarcoidosis  Assessment & Plan  Pt has history of sarcoidosis and follows with [facility] for treatment; has been stable    Continue prednisone 30mg daily; pt reports she takes both her 10mg and 20mg doses in the AM    Primary hypertension  Assessment & Plan  BP Readings from Last 3 Encounters:   02/12/23 139/70   01/27/23 137/77   01/23/23 162/78     Pt does not currently take medications to control BP  Previously was on amlodipine in 2022  Monitor BP while admitted  Hydralazine 10mg q6h prn SBP >180 if persistently stays high    History of Clostridium difficile colitis  Assessment & Plan  Pt with history of recurrent C Diff  Presents with signs of colitis on CT and change in stool in stoma with blood in colostomy bag  Plan:   Check C diff toxin, pending  Contact and hand hygiene isolation   Monitor stool output    Malignant neoplasm of sigmoid colon [facility])  Assessment & Plan  Pt has active colorectal cancer and currently takes capecitabine 1650mg twice daily  She is seen at [facility] in [place], [place] by Dr Asia Car for treatment  S/P colostomy    Plan:   Continue capecitabine BID          VTE Pharmacologic Prophylaxis: VTE Score: 4 Moderate Risk (Score 3-4) - Pharmacological DVT Prophylaxis Contraindicated  Sequential Compression Devices Ordered  Patient Centered Rounds: I performed bedside rounds with nursing staff today  Discussions with Specialists or Other Care Team Provider: GI    Education and Discussions with Family / Patient: Patient declined call to   Current Length of Stay: 0 day(s)  Current Patient Status: Observation   Discharge Plan: Anticipate discharge in 24-48 hrs to home  Code Status: Level 1 - Full Code    Subjective:   Patient denies any abdominal pain this AM and says that overall she feels well   This morning she noticed some blood in the stoma  On the exam doesn't look like there is bright red blood but stool has brown-reddish color and is watery brown  Otherwise denies any acute changes or overnight events  Objective:     Vitals:   Temp (24hrs), Av 2 °F (36 8 °C), Min:98 1 °F (36 7 °C), Max:98 4 °F (36 9 °C)    Temp:  [98 1 °F (36 7 °C)-98 4 °F (36 9 °C)] 98 2 °F (36 8 °C)  HR:  [67-78] 78  Resp:  [16] 16  BP: (116-143)/(67-82) 139/70  SpO2:  [97 %-99 %] 98 %  Body mass index is 26 37 kg/m²  Input and Output Summary (last 24 hours): Intake/Output Summary (Last 24 hours) at 2023 1008  Last data filed at 2023 1148  Gross per 24 hour   Intake 470 ml   Output --   Net 470 ml       Physical Exam:   Physical Exam  Vitals and nursing note reviewed  Constitutional:       General: She is not in acute distress  Appearance: She is well-developed  HENT:      Head: Normocephalic and atraumatic  Eyes:      Conjunctiva/sclera: Conjunctivae normal    Cardiovascular:      Rate and Rhythm: Normal rate and regular rhythm  Heart sounds: No murmur heard  Pulmonary:      Effort: Pulmonary effort is normal  No respiratory distress  Breath sounds: Normal breath sounds  Abdominal:      Palpations: Abdomen is soft  Tenderness: There is no abdominal tenderness  Comments: Stoma evaluated, watery light-brown stool seen in the stoma   Musculoskeletal:         General: No swelling  Cervical back: Neck supple  Skin:     General: Skin is warm and dry  Capillary Refill: Capillary refill takes less than 2 seconds  Neurological:      Mental Status: She is alert     Psychiatric:         Mood and Affect: Mood normal           Additional Data:     Labs:  Results from last 7 days   Lab Units 23  0644 23  0931 23  0435   WBC Thousand/uL 5 36  --  7 14   HEMOGLOBIN g/dL 10 5*   < > 10 6*   HEMATOCRIT % 32 0*  --  32 8*   PLATELETS Thousands/uL 92*  --  101*   NEUTROS PCT % --   --  83*   LYMPHS PCT %  --   --  12*   MONOS PCT %  --   --  4   EOS PCT %  --   --  0    < > = values in this interval not displayed  Results from last 7 days   Lab Units 23  0644 23  0931 23  0435   SODIUM mmol/L 140  --  140   POTASSIUM mmol/L 3 3*   < > 2 6*   CHLORIDE mmol/L 111*  --  107   CO2 mmol/L 23  --  26   BUN mg/dL 8  --  12   CREATININE mg/dL 0 53*  --  0 48*   ANION GAP mmol/L 6  --  7   CALCIUM mg/dL 8 3*  --  8 1*   ALBUMIN g/dL  --   --  2 6*   TOTAL BILIRUBIN mg/dL  --   --  1 35*   ALK PHOS U/L  --   --  351*   ALT U/L  --   --  64*   AST U/L  --   --  38   GLUCOSE RANDOM mg/dL 93  --  92    < > = values in this interval not displayed  Results from last 7 days   Lab Units 23  0435   INR  1 06                   Lines/Drains:  Invasive Devices     Peripheral Intravenous Line  Duration           Peripheral IV 02/10/23 Right Antecubital 1 day    Peripheral IV 23 Left Antecubital 1 day          Drain  Duration           Colostomy Descending/sigmoid LLQ 75 days    Ureteral Internal Stent Left ureter 6 Fr  19 days    Ureteral Internal Stent Right ureter 6 Fr  19 days                  Telemetry:  Telemetry Orders (From admission, onward)             24 Hour Telemetry Monitoring  Continuous x 24 Hours (Telem)           References:    Telemetry Guidelines   Question:  Reason for 24 Hour Telemetry  Answer:  Metabolic/Electrolyte Disturbance with High Probability of Dysrhythmia (K level <3 or >6, or KCL infusion >=10mEq/hr)                 Telemetry Reviewed: Normal Sinus Rhythm  Indication for Continued Telemetry Use: No indication for continued use  Will discontinue  Imaging: No pertinent imaging reviewed      Recent Cultures (last 7 days):         Last 24 Hours Medication List:   Current Facility-Administered Medications   Medication Dose Route Frequency Provider Last Rate   • capecitabine (XELODA) 1,650 mg  1,650 mg Oral BID Khurram Sanchez DO 1,650 mg (02/11/23 1218)   • cyanocobalamin  1,000 mcg Oral Daily Ray Baldwin, DO     • dextrose 5 % and sodium chloride 0 45 % with KCl 40 mEq/L  75 mL/hr Intravenous Continuous tK Perkins MD 75 mL/hr (02/12/23 0224)   • ondansetron  4 mg Intravenous Q6H PRN Ray Baldwin DO     • pantoprazole  40 mg Intravenous Q12H [address] Ray Baldwin, DO     • predniSONE  10 mg Oral Daily Ray Baldwin DO     • predniSONE  20 mg Oral Daily Ray Baldwin DO          Today, Patient Was Seen By: Felecia Coley MD    **Please Note: This note may have been constructed using a voice recognition system  **

## 2023-02-12 NOTE — ASSESSMENT & PLAN NOTE
BP Readings from Last 3 Encounters:   02/12/23 139/70   01/27/23 137/77   01/23/23 162/78     Pt does not currently take medications to control BP  Previously was on amlodipine in 2022     Monitor BP while admitted  Hydralazine 10mg q6h prn SBP >180 if persistently stays high

## 2023-02-12 NOTE — UTILIZATION REVIEW
Initial Clinical Review    Admission: Date/Time/Statement:   Admission Orders (From admission, onward)     Ordered        02/11/23 0207  Place in Observation  Once                      Orders Placed This Encounter   Procedures   • Place in Observation     Standing Status:   Standing     Number of Occurrences:   1     Order Specific Question:   Level of Care     Answer:   Med Surg [16]     ED Arrival Information     Expected   -    Arrival   2/10/2023 21:28    Acuity   Urgent            Means of arrival   Walk-In    Escorted by   Family Member    Service   Hospitalist    Admission type   Emergency            Arrival complaint   [address] [place] [address]            Chief Complaint   Patient presents with   • Medical Problem     Pt reports bleeding into colostomy bad for a few hours  Denies pain  -thinners       Initial Presentation: 56 y o  female with hx colorectal CA s/p colostomy-receiving chemotherapy, sarcoidosis on predniisone, HTN, , hx C diff who presents to ED from home with blood in colostomy output x 1 day  Pt states has had small amts blood in colostomy at times since having colostomy but not as much as today  Had  bright red and filled about a quarter of her bag  Also notes that she had stool in the bag in the AM which typically she does not stool in the AM  Denies any abdominal pain  Pt reports had episode in past of bleeding associated with colon infection  Has had no recent abx use   On exam,BP elevated,  abdomen protuberant, distended, non tender   Colostomy bag has brown stool w/ red brown fluid   Labs - K low 2 7, elevated LFT's , Hgb 10 6   EKG: regular sinus rhythm, PACs noted  CT A/P shows  colitis   Pt given IV/po K repletion in ED  PT admitted as OBS with bleeding from colostomy, likely 2/2  Colitis, hypokalemia, transaminitis  Plan - PPI IV BID, monitor colostomy output , H/H q8h, GI consult, transfuse hgb <7, hold AC, check stool panel, C diff  NPO  IVF   Monitor CMP   Monitor BP    Continue capecitabine BID    GI consult- No further episodes of bleeding here, noted to have brown stool in stoma  May be related to chemotherapy vs infectious  Obtain stool cultures to evaluate for infectious etiology, continue supportive care  If no improvement, may need scope through stoma  On xeloda/oxaliplatin for rectal CA- has been evaluated for concern for allergic reaction given development of hypotension and altered mental status after chemo infusions  Recently moved to [place]- may need oncology evaluation  Date: 2/12  Denies any abdominal pain today, denies any blood in the stools  Patient does report some brownish stool last night, no bright red blood  No nausea, vomiting  On exam, abdomen soft, nontender, bowel sounds present  Left sided colostomy bag with liquid stool this am, solid stool later in day , no blood noted  Hgb stable   Suspect colitis could be secondary to chemotherapy  Stool C  difficile, bacterial panel pending  Holding DVT ppx   Non ulcerogenic diet   K 3 3 today   Continue to monitor and replete   Telemetry- NSR-will d/c today          ED Triage Vitals   Temperature Pulse Respirations Blood Pressure SpO2   02/10/23 2205 02/10/23 2144 02/10/23 2144 02/10/23 2144 02/10/23 2144   98 °F (36 7 °C) 98 18 (!) 202/113 100 %      Temp Source Heart Rate Source Patient Position - Orthostatic VS BP Location FiO2 (%)   02/10/23 2205 02/10/23 2144 02/10/23 2144 02/10/23 2144 --   Oral Monitor Lying Right arm       Pain Score       02/11/23 0330       No Pain          Wt Readings from Last 1 Encounters:   02/10/23 65 4 kg (144 lb 2 9 oz)     Additional Vital Signs:   Date/Time Temp Pulse Resp BP MAP (mmHg) SpO2   02/12/23 06:47:35 98 2 °F (36 8 °C) 78 -- 139/70 93 98 %   02/11/23 21:58:39 98 1 °F (36 7 °C) 67 16 143/82 102 99 %   02/11/23 20:11:18 98 4 °F (36 9 °C) 75 16 116/67 83 97 %   02/11/23 0758 99 1 °F (37 3 °C) 88 -- 103/65 78 98 %   02/11/23 03:27:29 97 8 °F (36 6 °C) 89 -- 165/94 118 99 % 02/10/23 2205 98 °F (36 7 °C) 86 -- 156/74 -- 99 %     Pertinent Labs/Diagnostic Test Results:    2/10 ECG-  Interpretation: normal     Rate:     ECG rate:  72     ECG rate assessment: normal     Rhythm:     Rhythm: sinus rhythm    CT abdomen pelvis with contrast   Final Result by Dalton Pace DO (02/11 0107)      Interval decrease in right-sided hydronephrosis  Thickening of the ascending colon and cecum which may represent colitis  Follow-up with gastroenterology  Stable 1 cm right adrenal gland nodule  Although its imaging features are indeterminate, it does not have suspicious imaging features (heterogeneity, necrosis, irregular margins), therefore this is likely benign, and can be followed by non-contrast    abdomen CT or MRI in 12 months  If patient has history of adrenal hyperfunction, consider biochemical evaluation  The study was marked in [facility] for immediate notification        Workstation performed: [id number]               Results from last 7 days   Lab Units 02/12/23  0644 02/11/23  2019 02/11/23  0931 02/11/23  0435 02/10/23  2220   WBC Thousand/uL 5 36  --   --  7 14 9 57   HEMOGLOBIN g/dL 10 5* 11 1* 11 4* 10 6* 12 9   HEMATOCRIT % 32 0*  --   --  32 8* 38 1   PLATELETS Thousands/uL 92*  --   --  101* 144*   NEUTROS ABS Thousands/µL  --   --   --  5 88 8 54*         Results from last 7 days   Lab Units 02/12/23  0644 02/11/23  2019 02/11/23  0931 02/11/23  0435 02/10/23  2220   SODIUM mmol/L 140  --   --  140 140   POTASSIUM mmol/L 3 3* 3 7 2 7* 2 6* 2 7*   CHLORIDE mmol/L 111*  --   --  107 104   CO2 mmol/L 23  --   --  26 28   ANION GAP mmol/L 6  --   --  7 8   BUN mg/dL 8  --   --  12 12   CREATININE mg/dL 0 53*  --   --  0 48* 0 61   EGFR ml/min/1 73sq m 107  --   --  110 102   CALCIUM mg/dL 8 3*  --   --  8 1* 8 9   MAGNESIUM mg/dL  --   --   --  2 0  --      Results from last 7 days   Lab Units 02/11/23  0435 02/10/23  2220   AST U/L 38 47*   ALT U/L 64* 85*   ALK PHOS U/L 351* 426*   TOTAL PROTEIN g/dL 4 5* 5 7*   ALBUMIN g/dL 2 6* 3 2*   TOTAL BILIRUBIN mg/dL 1 35* 1 61*         Results from last 7 days   Lab Units 02/12/23  0644 02/11/23  0435 02/10/23  2220   GLUCOSE RANDOM mg/dL 93 92 166*             No results found for: BETA-HYDROXYBUTYRATE                           Results from last 7 days   Lab Units 02/11/23  0435 02/10/23  2220   PROTIME seconds 14 0 13 3   INR  1 06 0 99   PTT seconds 25 25                 ED Treatment:   Medication Administration from 02/10/2023 2128 to 02/11/2023 0321       Date/Time Order Dose Route Action     02/11/2023 0132 EST potassium chloride 20 mEq IVPB (premix) 20 mEq Intravenous New Bag     02/11/2023 0046 EST iohexol (OMNIPAQUE) 350 MG/ML injection (SINGLE-DOSE) 100 mL 100 mL Intravenous Given     02/11/2023 0249 EST potassium chloride (K-DUR,KLOR-CON) CR tablet 20 mEq 20 mEq Oral Given     02/11/2023 0257 EST potassium chloride (K-DUR,KLOR-CON) CR tablet 20 mEq 20 mEq Oral Given        Past Medical History:   Diagnosis Date   • Colon cancer ([place] [address] )    • Fall    • Headache    • Hemochromatosis, unspecified    • History of transfusion     2022 - no adverse reaction   • Kidney calculi    • Kidney stone    • Liver disease     hemangioma   • Muscle weakness    • Sarcoidosis    • Seizures ([place] [address] )     2022     Present on Admission:  • Malignant neoplasm of sigmoid colon ([place] [address] )  • Bleeding from colostomy [facility])  • Sarcoidosis  • Adrenal nodule (HCC)  • Hypokalemia  • CKD (chronic kidney disease)  • Primary hypertension  • Transaminitis      Admitting Diagnosis: Hypokalemia [E87 6]  Colitis [M16 8]  Complication of colostomy ([place] [address] ) [K94 00]  Bleeding from colostomy ([place] [address] ) [K94 01]  Age/Sex: 56 y o  female  Admission Orders:  Scheduled Medications:  capecitabine (XELODA) 1,650 mg, 1,650 mg, Oral, BID  cyanocobalamin, 1,000 mcg, Oral, Daily  pantoprazole, 40 mg, Intravenous, Q12H [address]  predniSONE, 10 mg, Oral, Daily  predniSONE, 20 mg, Oral, Daily    potassium chloride (K-DUR,KLOR-CON) CR tablet 40 mEq  Dose: 40 mEq  Freq: Every 4 hours Route: PO  Start: 02/11/23 0630 End: 02/11/23 1332  potassium chloride (K-DUR,KLOR-CON) CR tablet 40 mEq  Dose: 40 mEq  Freq: Once Route: PO  Start: 02/12/23 0745 End: 02/12/23 0905  potassium chloride (K-DUR,KLOR-CON) CR tablet 20 mEq  Dose: 20 mEq  Freq: Once Route: PO  Start: 02/11/23 0630 End: 02/11/23 0917  Continuous IV Infusions:  dextrose 5 % and sodium chloride 0 45 % with KCl 40 mEq/L, 75 mL/hr, Intravenous, Continuous    lactated ringers infusion  Rate: 75 mL/hr Dose: 75 mL/hr  Freq: Continuous Route: IV  Last Dose: Stopped (02/11/23 1148)  Start: 02/11/23 0345 End: 02/11/23 1041  PRN Meds:  ondansetron, 4 mg, Intravenous, Q6H PRN    FL diet, non ulcerogenic, lactose restricted, lo fiber/residue   2/12 H/H q8h    IP CONSULT TO GASTROENTEROLOGY    Network Utilization Review Department  ATTENTION: Please call with any questions or concerns to [phone] and carefully listen to the prompts so that you are directed to the right person  All voicemails are confidential   Roshan Palomares all requests for admission clinical reviews, approved or denied determinations and any other requests to dedicated fax number below belonging to the campus where the patient is receiving treatment   List of dedicated fax numbers for the Facilities:  [address] DENIALS (Administrative/Medical Necessity) [phone]   [address] (Maternity/NICU/Pediatrics) [phone]   [address] [phone]   [facility] [phone]   [facility] [phone]   [employer] [address] [facility] [phone]   [address] [phone]

## 2023-02-12 NOTE — ASSESSMENT & PLAN NOTE
Pt has active colorectal cancer and currently takes capecitabine 1650mg twice daily  She is seen at [facility] in [place], [place] by Dr Uvaldo Ramirez for treatment  S/P colostomy    Plan:   Continue capecitabine BID

## 2023-02-12 NOTE — ASSESSMENT & PLAN NOTE
Pt has history of sarcoidosis and follows with [facility] for treatment; has been stable    Continue prednisone 30mg daily; pt reports she takes both her 10mg and 20mg doses in the AM

## 2023-02-12 NOTE — ASSESSMENT & PLAN NOTE
Pt with history of recurrent C Diff  Presents with signs of colitis on CT and change in stool in stoma with blood in colostomy bag       Plan:   Check C diff toxin, pending  Contact and hand hygiene isolation   Monitor stool output

## 2023-02-12 NOTE — ASSESSMENT & PLAN NOTE
Lab Results   Component Value Date    K 3 3 (L) 02/12/2023    K 3 7 02/11/2023    K 2 7 (LL) 02/11/2023       Pt has chronic hypokalemia and takes 10mg potassium chloride daily at home     POA: K+ found to be 2 7 which was repleted  Continue monitoring and replenish as needed

## 2023-02-12 NOTE — ASSESSMENT & PLAN NOTE
Lab Results   Component Value Date    EGFR 107 02/12/2023    EGFR 110 02/11/2023    EGFR 102 02/10/2023    CREATININE 0 53 (L) 02/12/2023    CREATININE 0 48 (L) 02/11/2023    CREATININE 0 61 02/10/2023     Creatinine currently stable  Non-ulcerogenic diet  Monitor CMP

## 2023-02-13 LAB
ANION GAP SERPL CALCULATED.3IONS-SCNC: 5 MMOL/L (ref 4–13)
BUN SERPL-MCNC: 9 MG/DL (ref 5–25)
CALCIUM SERPL-MCNC: 8.1 MG/DL (ref 8.4–10.2)
CHLORIDE SERPL-SCNC: 110 MMOL/L (ref 96–108)
CO2 SERPL-SCNC: 24 MMOL/L (ref 21–32)
CREAT SERPL-MCNC: 0.52 MG/DL (ref 0.6–1.3)
ERYTHROCYTE [DISTWIDTH] IN BLOOD BY AUTOMATED COUNT: 17.2 % (ref 11.6–15.1)
GFR SERPL CREATININE-BSD FRML MDRD: 107 ML/MIN/1.73SQ M
GLUCOSE SERPL-MCNC: 106 MG/DL (ref 65–140)
HCT VFR BLD AUTO: 34.5 % (ref 34.8–46.1)
HGB BLD-MCNC: 10.8 G/DL (ref 11.5–15.4)
MCH RBC QN AUTO: 32.6 PG (ref 26.8–34.3)
MCHC RBC AUTO-ENTMCNC: 31.3 G/DL (ref 31.4–37.4)
MCV RBC AUTO: 104 FL (ref 82–98)
PLATELET # BLD AUTO: 109 THOUSANDS/UL (ref 149–390)
PMV BLD AUTO: 9.3 FL (ref 8.9–12.7)
POTASSIUM SERPL-SCNC: 3.2 MMOL/L (ref 3.5–5.3)
RBC # BLD AUTO: 3.31 MILLION/UL (ref 3.81–5.12)
SODIUM SERPL-SCNC: 139 MMOL/L (ref 135–147)
WBC # BLD AUTO: 6.74 THOUSAND/UL (ref 4.31–10.16)

## 2023-02-13 RX ORDER — POTASSIUM CHLORIDE 20 MEQ/1
40 TABLET, EXTENDED RELEASE ORAL ONCE
Status: COMPLETED | OUTPATIENT
Start: 2023-02-13 | End: 2023-02-13

## 2023-02-13 RX ORDER — VANCOMYCIN HYDROCHLORIDE 125 MG/1
CAPSULE ORAL
Qty: 78 CAPSULE | Refills: 0 | Status: SHIPPED | OUTPATIENT
Start: 2023-02-14 | End: 2023-04-02

## 2023-02-13 RX ORDER — POTASSIUM CHLORIDE 20 MEQ/1
40 TABLET, EXTENDED RELEASE ORAL DAILY
Status: DISCONTINUED | OUTPATIENT
Start: 2023-02-14 | End: 2023-02-14 | Stop reason: HOSPADM

## 2023-02-13 RX ORDER — PANTOPRAZOLE SODIUM 40 MG/1
40 TABLET, DELAYED RELEASE ORAL EVERY 12 HOURS
Status: DISCONTINUED | OUTPATIENT
Start: 2023-02-13 | End: 2023-02-14 | Stop reason: HOSPADM

## 2023-02-13 RX ADMIN — CAPECITABINE 1650 MG: 500 TABLET, FILM COATED ORAL at 19:36

## 2023-02-13 RX ADMIN — VANCOMYCIN HYDROCHLORIDE 125 MG: 125 CAPSULE ORAL at 23:03

## 2023-02-13 RX ADMIN — VANCOMYCIN HYDROCHLORIDE 125 MG: 125 CAPSULE ORAL at 04:56

## 2023-02-13 RX ADMIN — PREDNISONE 10 MG: 20 TABLET ORAL at 08:05

## 2023-02-13 RX ADMIN — POTASSIUM CHLORIDE 40 MEQ: 1500 TABLET, EXTENDED RELEASE ORAL at 11:45

## 2023-02-13 RX ADMIN — PREDNISONE 20 MG: 20 TABLET ORAL at 08:05

## 2023-02-13 RX ADMIN — PANTOPRAZOLE SODIUM 40 MG: 40 TABLET, DELAYED RELEASE ORAL at 17:38

## 2023-02-13 RX ADMIN — DEXTROSE, SODIUM CHLORIDE, AND POTASSIUM CHLORIDE 75 ML/HR: 5; .45; .3 INJECTION INTRAVENOUS at 06:05

## 2023-02-13 RX ADMIN — CAPECITABINE 1650 MG: 500 TABLET, FILM COATED ORAL at 08:06

## 2023-02-13 RX ADMIN — PANTOPRAZOLE SODIUM 40 MG: 40 INJECTION, POWDER, FOR SOLUTION INTRAVENOUS at 08:05

## 2023-02-13 RX ADMIN — CYANOCOBALAMIN TAB 500 MCG 1000 MCG: 500 TAB at 08:05

## 2023-02-13 RX ADMIN — POTASSIUM CHLORIDE 40 MEQ: 1500 TABLET, EXTENDED RELEASE ORAL at 17:38

## 2023-02-13 RX ADMIN — VANCOMYCIN HYDROCHLORIDE 125 MG: 125 CAPSULE ORAL at 09:21

## 2023-02-13 RX ADMIN — VANCOMYCIN HYDROCHLORIDE 125 MG: 125 CAPSULE ORAL at 17:38

## 2023-02-13 NOTE — ASSESSMENT & PLAN NOTE
P/w with one day of bright red bleeding from her colostomy bag  The bleeding is painless  She also reports history of small amounts of blood but states that this bleeding filled her colostomy bag a quarter of the way  She did not notice any bloody stool in the days prior to this episode  She has history of recurrent C diff Colitis  Blood was bright read in bag on arrival;   Now colostomy has areas of brownish blood, no bright red blood  Not currently on [facility]  Most recent Colonoscopy, MRI AP, MRI pelvis 10/28 at [facility]:   "- Malignant partially obstructing tumor in the recto-sigmoid colon  Biopsied  Tattooed  - One 4 mm polyp in the descending colon  - One 10 mm polyp in the cecum   - The distal rectum and anal verge are normal on retroflexion view  MRI AP: 5 3 cm rectal mass involving the mid to upper rectum as described above  The mass abuts the posterior wall of the cervix with questionable early involvement of the cervical serosa as described above  MRI Pelvis: IMPRESSION:  Stage: T [3 c] N[0]  CRM: Clear  Sphincter involvement: No  Suspicious extra mesorectal lymph nodes: No"    Blood Pressure: 122/70    Recent Labs     02/12/23  0644 02/12/23  2252 02/13/23  0815   HGB 10 5* 10 1* 10 8*     CT abdomen pelvis with contrast    Result Date: 2/11/2023  Impression: Interval decrease in right-sided hydronephrosis  Thickening of the ascending colon and cecum which may represent colitis  Follow-up with gastroenterology  Stable 1 cm right adrenal gland nodule  Although its imaging features are indeterminate, it does not have suspicious imaging features (heterogeneity, necrosis, irregular margins), therefore this is likely benign, and can be followed by non-contrast abdomen CT or MRI in 12 months  If patient has history of adrenal hyperfunction, consider biochemical evaluation  Bleeding likely secondary to colitis seen on CT  Possibly inflammatory vs infectious   There could also be a UGI bleed however with current CT findings, this is less likely  Plan:  · Protonix IV 40mg BID  · Monitor colostomy for further bleeding  · CBC daily   · Consult gastroenterology, recommendations appreciated  · Transfuse if Hb < 7, Type and screen  · Hold DVT prophylaxis  · Monitor VS  · Check stool panel, C diff   As patient has history of recurrent C diff infections- positive  · Vancomycin started

## 2023-02-13 NOTE — PLAN OF CARE
Problem: PAIN - ADULT  Goal: Verbalizes/displays adequate comfort level or baseline comfort level  Description: Interventions:  - Encourage patient to monitor pain and request assistance  - Assess pain using appropriate pain scale  - Administer analgesics based on type and severity of pain and evaluate response  - Implement non-pharmacological measures as appropriate and evaluate response  - Consider cultural and social influences on pain and pain management  - Notify physician/advanced practitioner if interventions unsuccessful or patient reports new pain  Outcome: Progressing     Problem: INFECTION - ADULT  Goal: Absence or prevention of progression during hospitalization  Description: INTERVENTIONS:  - Assess and monitor for signs and symptoms of infection  - Monitor lab/diagnostic results  - Monitor all insertion sites, i e  indwelling lines, tubes, and drains  - Monitor endotracheal if appropriate and nasal secretions for changes in amount and color  - [place] appropriate cooling/warming therapies per order  - Administer medications as ordered  - Instruct and encourage patient and family to use good hand hygiene technique  - Identify and instruct in appropriate isolation precautions for identified infection/condition  Outcome: Progressing  Goal: Absence of fever/infection during neutropenic period  Description: INTERVENTIONS:  - Monitor WBC    Outcome: Progressing

## 2023-02-13 NOTE — PROGRESS NOTES
The pantoprazole has been converted to Oral per [facility] IV-to-PO Auto-Conversion Protocol for Adults as approved by the Pharmacy and Therapeutics Committee  The patient met all eligible criteria:    3 Age = 25years old   2) Received at least one dose of the IV form   3) Receiving at least one other scheduled oral/enteral medication   4) Tolerating an oral/enteral diet     and did not have any exclusions:     1) Critical care patient   2) Active GI bleed (IF assessing H2RAs or PPIs)   3) Continuous tube feeding (IF assessing cipro, doxycycline, levofloxacin, minocycline, rifampin, or voriconazole)   4) Receiving PO vancomycin (IF assessing metronidazole)   5) Persistent nausea and/or vomiting   6) Ileus or gastrointestinal obstruction   7) Helen/nasogastric tube set for continuous suction   8) Specific order not to automatically convert to PO (in the order's comments or if discussed in the most recent Infectious Disease or primary team's progress notes)

## 2023-02-13 NOTE — ASSESSMENT & PLAN NOTE
BP Readings from Last 3 Encounters:   02/13/23 103/66   01/27/23 137/77   01/23/23 162/78     Pt does not currently take medications to control BP  Previously was on amlodipine in 2022     Monitor BP while admitted  Hydralazine 10mg q6h prn SBP >180 if persistently stays high

## 2023-02-13 NOTE — ASSESSMENT & PLAN NOTE
Pt has history of sarcoidosis and follows with [facility] for treatment; has been stable    Plan:   Continue prednisone 30mg daily; pt reports she takes both her 10mg and 20mg doses in the AM

## 2023-02-13 NOTE — ASSESSMENT & PLAN NOTE
Pt with history of recurrent C Diff  Presents with signs of colitis on CT and change in stool in stoma with blood in colostomy bag       Plan:   · Check C diff toxin positive  · Contact and hand hygien started on e isolation   · Monitor stool output  · Continue Oral vancomycin 125 mg 4 times daily

## 2023-02-13 NOTE — ASSESSMENT & PLAN NOTE
Lab Results   Component Value Date    K 3 2 (L) 02/13/2023    K 3 3 (L) 02/12/2023    K 3 7 02/11/2023       Pt has chronic hypokalemia and takes 10mg potassium chloride daily at home    POA: K+ found to be 2 7 which was repleted    Plan:  · Continue monitoring and replenish as needed  · We will start on 40 mg KDur p o  daily

## 2023-02-13 NOTE — PROGRESS NOTES
Progress Note - Wandy Gist 56 y o  female MRN: [id number]    Unit/Bed#: S -01 Encounter: [id number]    Assessment and Plan:   Principal Problem:    Bleeding from colostomy [facility])  Active Problems:    Malignant neoplasm of sigmoid colon ([facility] [address] )    History of Clostridium difficile colitis    Primary hypertension    Sarcoidosis    Hypokalemia    Transaminitis    Adrenal nodule (HCC)    CKD (chronic kidney disease)    #1  Recurrent C diffcolitis with blood in stool: no longer has blood in stool, hgb stable, C diff EIA and toxin positive, had the same in December and completed 14 day course  On chemo for rectal cancer    -will complete vancomycin taper starting with 125 mg 4x daily for 14 days, then 125 mg BID x 7 days, 125 mg daily x 7 days, 125 mg every other day x 7 days, and then 125 mg every 3 days for 14 days  -monitor stool output, abdominal exam, for recurrence of bleeding  -low residue lactose free diet as tolerated  -likely d/c tomorrow if continuing to do well      ----------------------------------------------------------------------------------------------------------------    Subjective:     Patient denies blood in bag, amount of stool output at baseline  Had severe nausea this AM that woke her from sleep but this resolved and she was able to tolerate some breakfast     Objective:     Vitals: Blood pressure 103/66, pulse 84, temperature 98 1 °F (36 7 °C), resp  rate 16, weight 65 4 kg (144 lb 2 9 oz), SpO2 99 %  ,Body mass index is 26 37 kg/m²      No intake or output data in the 24 hours ending 02/13/23 1116    Physical Exam:     General Appearance: Alert, appears stated age and cooperative  Lungs: Clear to auscultation bilaterally, no rales or rhonchi, no labored breathing/accessory muscle use  Heart: Regular rate and rhythm, S1, S2 normal, no murmur, click, rub or gallop  Abdomen: Soft, non-tender, non-distended; bowel sounds normal; no masses or no organomegaly, ostomy in left abdomen with Calleen Murrain stool, no blood  Extremities: No cyanosis, clubbing, or edema    Invasive Devices     Peripheral Intravenous Line  Duration           Peripheral IV 02/10/23 Right Antecubital 2 days    Peripheral IV 02/11/23 Left Antecubital 2 days          Drain  Duration           Colostomy Descending/sigmoid LLQ 76 days    Ureteral Internal Stent Left ureter 6 Fr  20 days    Ureteral Internal Stent Right ureter 6 Fr  20 days                Lab Results:  Results from last 7 days   Lab Units 02/13/23  0815 02/11/23  0931 02/11/23  0435   WBC Thousand/uL 6 74   < > 7 14   HEMOGLOBIN g/dL 10 8*   < > 10 6*   HEMATOCRIT % 34 5*   < > 32 8*   PLATELETS Thousands/uL 109*   < > 101*   NEUTROS PCT %  --   --  83*   LYMPHS PCT %  --   --  12*   MONOS PCT %  --   --  4   EOS PCT %  --   --  0    < > = values in this interval not displayed  Results from last 7 days   Lab Units 02/13/23  0815 02/11/23  0931 02/11/23  0435   POTASSIUM mmol/L 3 2*   < > 2 6*   CHLORIDE mmol/L 110*   < > 107   CO2 mmol/L 24   < > 26   BUN mg/dL 9   < > 12   CREATININE mg/dL 0 52*   < > 0 48*   CALCIUM mg/dL 8 1*   < > 8 1*   ALK PHOS U/L  --   --  351*   ALT U/L  --   --  64*   AST U/L  --   --  38    < > = values in this interval not displayed  Invalid input(s): BILI  Results from last 7 days   Lab Units 02/11/23  0435   INR  1 06           Imaging Studies: I have personally reviewed pertinent imaging studies  CT abdomen pelvis with contrast    Result Date: 2/11/2023  Impression: Interval decrease in right-sided hydronephrosis  Thickening of the ascending colon and cecum which may represent colitis  Follow-up with gastroenterology  Stable 1 cm right adrenal gland nodule  Although its imaging features are indeterminate, it does not have suspicious imaging features (heterogeneity, necrosis, irregular margins), therefore this is likely benign, and can be followed by non-contrast abdomen CT or MRI in 12 months    If patient has history of adrenal hyperfunction, consider biochemical evaluation  The study was marked in [facility] for immediate notification   Workstation performed: [id number]

## 2023-02-13 NOTE — PROGRESS NOTES
[facility]  Progress Note - Binnie Books 1965, 56 y o  female MRN: [id number]  Unit/Bed#: S -01 Encounter: [id number]  Primary Care Provider: No primary care provider on file  Date and time admitted to hospital: 2/10/2023  9:40 PM    * Bleeding from colostomy [facility])  Assessment & Plan  P/w with one day of bright red bleeding from her colostomy bag  The bleeding is painless  She also reports history of small amounts of blood but states that this bleeding filled her colostomy bag a quarter of the way  She did not notice any bloody stool in the days prior to this episode  She has history of recurrent C diff Colitis  Blood was bright read in bag on arrival;   Now colostomy has areas of brownish blood, no bright red blood  Not currently on [facility]  Most recent Colonoscopy, MRI AP, MRI pelvis 10/28 at [facility] [employer]:   "- Malignant partially obstructing tumor in the recto-sigmoid colon  Biopsied  Tattooed  - One 4 mm polyp in the descending colon  - One 10 mm polyp in the cecum   - The distal rectum and anal verge are normal on retroflexion view  MRI AP: 5 3 cm rectal mass involving the mid to upper rectum as described above  The mass abuts the posterior wall of the cervix with questionable early involvement of the cervical serosa as described above  MRI Pelvis: IMPRESSION:  Stage: T [3 c] N[0]  CRM: Clear  Sphincter involvement: No  Suspicious extra mesorectal lymph nodes: No"    Blood Pressure: 122/70    Recent Labs     02/12/23  0644 02/12/23  2252 02/13/23  0815   HGB 10 5* 10 1* 10 8*     CT abdomen pelvis with contrast    Result Date: 2/11/2023  Impression: Interval decrease in right-sided hydronephrosis  Thickening of the ascending colon and cecum which may represent colitis  Follow-up with gastroenterology  Stable 1 cm right adrenal gland nodule  Although its imaging features are indeterminate, it does not have suspicious imaging features (heterogeneity, necrosis, irregular margins), therefore this is likely benign, and can be followed by non-contrast abdomen CT or MRI in 12 months  If patient has history of adrenal hyperfunction, consider biochemical evaluation  Bleeding likely secondary to colitis seen on CT  Possibly inflammatory vs infectious  There could also be a UGI bleed however with current CT findings, this is less likely  Plan:  · Protonix IV 40mg BID  · Monitor colostomy for further bleeding  · CBC daily   · Consult gastroenterology, recommendations appreciated  · Transfuse if Hb < 7, Type and screen  · Hold DVT prophylaxis  · Monitor VS  · Check stool panel, C diff  As patient has history of recurrent C diff infections- positive  · Vancomycin started     CKD (chronic kidney disease)  Assessment & Plan  Lab Results   Component Value Date    EGFR 107 02/13/2023    EGFR 107 02/12/2023    EGFR 110 02/11/2023    CREATININE 0 52 (L) 02/13/2023    CREATININE 0 53 (L) 02/12/2023    CREATININE 0 48 (L) 02/11/2023     Creatinine currently stable  Non-ulcerogenic diet  Monitor CMP    Adrenal nodule ([address] )  Assessment & Plan  CT abdomen pelvis with contrast    Result Date: 2/11/2023  Impression: Stable 1 cm right adrenal gland nodule  Although its imaging features are indeterminate, it does not have suspicious imaging features (heterogeneity, necrosis, irregular margins), therefore this is likely benign, and can be followed by non-contrast abdomen CT or MRI in 12 months  If patient has history of adrenal hyperfunction, consider biochemical evaluation  Adrenal nodule is again noted on CT  Pt should have follow CT or MRI in 12 months as recommended  Monitor for acute changes in symptoms      Transaminitis  Assessment & Plan  Possibly secondary to hepatic steatosis, hemochromatosis history  Monitor CMP  GI is consulted    Hypokalemia  Assessment & Plan  Lab Results   Component Value Date    K 3 2 (L) 02/13/2023    K 3 3 (L) 02/12/2023    K 3 7 02/11/2023 Pt has chronic hypokalemia and takes 10mg potassium chloride daily at home  POA: K+ found to be 2 7 which was repleted    Plan:  · Continue monitoring and replenish as needed  · We will start on 40 mg KDur p o  daily    Sarcoidosis  Assessment & Plan  Pt has history of sarcoidosis and follows with [facility] for treatment; has been stable    Plan:   Continue prednisone 30mg daily; pt reports she takes both her 10mg and 20mg doses in the AM    Primary hypertension  Assessment & Plan  BP Readings from Last 3 Encounters:   02/13/23 103/66   01/27/23 137/77   01/23/23 162/78     Pt does not currently take medications to control BP  Previously was on amlodipine in 2022  Monitor BP while admitted  Hydralazine 10mg q6h prn SBP >180 if persistently stays high    History of Clostridium difficile colitis  Assessment & Plan  Pt with history of recurrent C Diff  Presents with signs of colitis on CT and change in stool in stoma with blood in colostomy bag  Plan:   · Check C diff toxin positive  · Contact and hand hygien started on e isolation   · Monitor stool output  · Continue Oral vancomycin 125 mg 4 times daily    Malignant neoplasm of sigmoid colon [facility])  Assessment & Plan  Pt has active colorectal cancer and currently takes capecitabine 1650mg twice daily  She is [place] [facility] [place], [place] by Dr Asia Car for treatment  S/P colostomy    Plan:   Continue capecitabine BID      VTE Pharmacologic Prophylaxis: VTE Score: 4 Moderate Risk (Score 3-4) - Pharmacological DVT Prophylaxis Contraindicated  Sequential Compression Devices Ordered  Patient Centered Rounds: I performed bedside rounds with nursing staff today  Discussions with Specialists or Other Care Team Provider: Gastroenterology    Education and Discussions with Family / Patient: Patient declined call to        Current Length of Stay: 0 day(s)  Current Patient Status: Observation   Discharge Plan: Anticipate discharge tomorrow to home     Code Status: Level 1 - Full Code    Subjective:   Patient was seen today at bedside  She reported episode of nausea but no vomiting overnight  This has since resolved  Patient has been eating and drinking well and has had normal stool output from her ostomy  She denies any further blood and it is no longer watery  She denies fever, chills, nausea, vomiting, chest pain, abdominal pain, or shortness of breath  Objective:     Vitals:   Temp (24hrs), Av 5 °F (36 9 °C), Min:98 1 °F (36 7 °C), Max:99 °F (37 2 °C)    Temp:  [98 1 °F (36 7 °C)-99 °F (37 2 °C)] 98 5 °F (36 9 °C)  HR:  [76-87] 87  Resp:  [18] 18  BP: (103-125)/(61-70) 122/70  SpO2:  [97 %-99 %] 97 %  Body mass index is 26 37 kg/m²  Input and Output Summary (last 24 hours):   No intake or output data in the 24 hours ending 23 1537    Physical Exam:   Physical Exam  Vitals and nursing note reviewed  Constitutional:       General: She is not in acute distress  Appearance: She is well-developed  HENT:      Head: Normocephalic and atraumatic  Eyes:      Conjunctiva/sclera: Conjunctivae normal    Cardiovascular:      Rate and Rhythm: Normal rate and regular rhythm  Heart sounds: No murmur heard  Pulmonary:      Effort: Pulmonary effort is normal  No respiratory distress  Breath sounds: Normal breath sounds  Abdominal:      Palpations: Abdomen is soft  Tenderness: There is no abdominal tenderness  Comments: Stoma evaluated, light-brown stool seen in the stoma   Musculoskeletal:         General: No swelling  Cervical back: Neck supple  Skin:     General: Skin is warm and dry  Capillary Refill: Capillary refill takes less than 2 seconds  Neurological:      Mental Status: She is alert     Psychiatric:         Mood and Affect: Mood normal           Additional Data:     Labs:  Results from last 7 days   Lab Units 23  0815 23  0931 23  0435   WBC Thousand/uL 6 74   < > 7 14 HEMOGLOBIN g/dL 10 8*   < > 10 6*   HEMATOCRIT % 34 5*   < > 32 8*   PLATELETS Thousands/uL 109*   < > 101*   NEUTROS PCT %  --   --  83*   LYMPHS PCT %  --   --  12*   MONOS PCT %  --   --  4   EOS PCT %  --   --  0    < > = values in this interval not displayed  Results from last 7 days   Lab Units 02/13/23  0815 02/11/23  0931 02/11/23  0435   SODIUM mmol/L 139   < > 140   POTASSIUM mmol/L 3 2*   < > 2 6*   CHLORIDE mmol/L 110*   < > 107   CO2 mmol/L 24   < > 26   BUN mg/dL 9   < > 12   CREATININE mg/dL 0 52*   < > 0 48*   ANION GAP mmol/L 5   < > 7   CALCIUM mg/dL 8 1*   < > 8 1*   ALBUMIN g/dL  --   --  2 6*   TOTAL BILIRUBIN mg/dL  --   --  1 35*   ALK PHOS U/L  --   --  351*   ALT U/L  --   --  64*   AST U/L  --   --  38   GLUCOSE RANDOM mg/dL 106   < > 92    < > = values in this interval not displayed  Results from last 7 days   Lab Units 02/11/23  0435   INR  1 06                   Lines/Drains:  Invasive Devices     Peripheral Intravenous Line  Duration           Peripheral IV 02/10/23 Right Antecubital 2 days    Peripheral IV 02/11/23 Left Antecubital 2 days          Drain  Duration           Colostomy Descending/sigmoid LLQ 77 days    Ureteral Internal Stent Left ureter 6 Fr  21 days    Ureteral Internal Stent Right ureter 6 Fr  21 days                      Imaging: All radiological imaging reports reviewed from this admission  No new imaging today      Recent Cultures (last 7 days):   Results from last 7 days   Lab Units 02/11/23  0546   C DIFF TOXIN B BY PCR  Positive*       Last 24 Hours Medication List:   Current Facility-Administered Medications   Medication Dose Route Frequency Provider Last Rate   • capecitabine (XELODA) 1,650 mg  1,650 mg Oral BID Wisamelijah Tucker, DO 1,650 mg (02/11/23 1218)   • cyanocobalamin  1,000 mcg Oral Daily Wisam Rochester, DO     • ondansetron  4 mg Intravenous Q6H PRN Wisam Simonsk, DO     • pantoprazole  40 mg Oral Q12H Frankie Mao MD     • potassium chloride 40 mEq Oral Once Mukesh Arellano MD     • [START ON 2/14/2023] potassium chloride  40 mEq Oral Daily Mukesh Arellano MD     • predniSONE  10 mg Oral Daily Malorie Popper, DO     • predniSONE  20 mg Oral Daily Malorie Popper, DO     • vancomycin oral  125 mg Oral Q6H [address] Anika Tobar PA-C          Today, Patient Was Seen By: Mukesh Arellano MD    **Please Note: This note may have been constructed using a voice recognition system  **

## 2023-02-13 NOTE — ASSESSMENT & PLAN NOTE
Pt has active colorectal cancer and currently takes capecitabine 1650mg twice daily  She is seen [place] [facility] [place], [place] by Dr Leonora Marcus for treatment  S/P colostomy    Plan:   Continue capecitabine BID

## 2023-02-13 NOTE — CASE MANAGEMENT
Case Management Assessment    Patient name Xiomy Rodriguez  Location S /S -01 MRN [id number]  : 1965 Date 2023       Current Admission Date: 2/10/2023  Current Admission Diagnosis:Bleeding from colostomy [facility])   Patient Active Problem List    Diagnosis Date Noted   • Bleeding from colostomy [facility]) 2023   • Acute cystitis with hematuria 2023   • Adrenal nodule ([facility] ) 2023   • Sepsis ([facility] [address] ) 2022   • [facility] ) 2022   • History of Clostridium difficile colitis 2022   • Other hemochromatosis 2022   • Primary hypertension 2022   • Sarcoidosis 2022   • Impaired fasting glucose 2022   • Hypokalemia 2022   • Transaminitis 2022   • R flank pain with bilateral hydronephrosis 2022   • CKD (chronic kidney disease) 2022      LOS (days): 0  Geometric Mean LOS (GMLOS) (days):   Days to GMLOS:     OBJECTIVE:              Current admission status: Observation       [facility] Pharmacy:   [facility]pharmacy [address] [place] [address]  Phone: [phone] Fax: [facility] ([place] [address]  Phone: [phone] Fax: [phone]    Primary Care Provider: No primary care provider on file  [place] [employer] [place]  Secondary Insurance:     ASSESSMENT:  Active Health Care Proxies    There are no active Health Care Proxies on file                   Readmission Root Cause  30 Day Readmission: No    Patient Information  Admitted from[de-identified] Home  Mental Status: Alert  During Assessment patient was accompanied by: Not accompanied during assessment  Assessment information provided by[de-identified] Patient  Primary Caregiver: Self  Support Systems: Self, Spouse/significant other, Daughter  [place] of Residence: [address] [place] [address] do you live in?: [facility] entry access options   Select all that apply : No steps to enter home  Type of Current Residence: 2 story home  Upon entering residence, is there a bedroom on the main floor (no further steps)?: Yes  Upon entering residence, is there a bathroom on the main floor (no further steps)?: Yes  In the last 12 months, was there a time when you were not able to pay the mortgage or rent on time?: No  In the last 12 months, how many places have you lived?: 1  In the last 12 months, was there a time when you did not have a steady place to sleep or slept in a shelter (including now)?: No  Homeless/housing insecurity resource given?: N/A  Living Arrangements: Lives w/ Friend, Lives w/ Spouse/significant other, Lives w/ Son, Lives w/ Daughter (Lives with ex- and 2 children)  Is patient a ?: No    Activities of Daily Living Prior to Admission  Functional Status: Independent  Completes ADLs independently?: Yes  Ambulates independently?: Yes  Does patient use assisted devices?: No  Does patient currently own DME?: No  Does patient have a history of Outpatient Therapy (PT/OT)?: No  Does the patient have a history of Short-Term Rehab?: No  Does patient have a history of HHC?: No  Does patient currently have [facility]?: No         Patient Information Continued  Income Source: Unemployed  Does patient have prescription coverage?: Yes (Has Medicaid)  Within the past 12 months, you worried that your food would run out before you got the money to buy more : Never true  Within the past 12 months, the food you bought just didn't last and you didn't have money to get more : Never true  Food insecurity resource given?: N/A  Does patient receive dialysis treatments?: No  Does patient have a history of substance abuse?: No  Does patient have a history of Mental Health Diagnosis?: No         Means of Transportation  [facility] Family transport  In the past 12 months, has lack of transportation kept you from medical appointments or from getting medications?: No  In the past 12 months, has lack of transportation kept you from meetings, work, or from getting things needed for daily living?: No  Was application for public transport provided?: N/A

## 2023-02-13 NOTE — ASSESSMENT & PLAN NOTE
Lab Results   Component Value Date    EGFR 107 02/13/2023    EGFR 107 02/12/2023    EGFR 110 02/11/2023    CREATININE 0 52 (L) 02/13/2023    CREATININE 0 53 (L) 02/12/2023    CREATININE 0 48 (L) 02/11/2023     Creatinine currently stable  Non-ulcerogenic diet  Monitor CMP

## 2023-02-14 VITALS
BODY MASS INDEX: 26.37 KG/M2 | WEIGHT: 144.18 LBS | HEART RATE: 89 BPM | TEMPERATURE: 98.1 F | OXYGEN SATURATION: 97 % | DIASTOLIC BLOOD PRESSURE: 84 MMHG | RESPIRATION RATE: 18 BRPM | SYSTOLIC BLOOD PRESSURE: 140 MMHG

## 2023-02-14 LAB
ANION GAP SERPL CALCULATED.3IONS-SCNC: 5 MMOL/L (ref 4–13)
BUN SERPL-MCNC: 13 MG/DL (ref 5–25)
CALCIUM SERPL-MCNC: 8 MG/DL (ref 8.4–10.2)
CHLORIDE SERPL-SCNC: 112 MMOL/L (ref 96–108)
CO2 SERPL-SCNC: 25 MMOL/L (ref 21–32)
CREAT SERPL-MCNC: 0.55 MG/DL (ref 0.6–1.3)
GFR SERPL CREATININE-BSD FRML MDRD: 105 ML/MIN/1.73SQ M
GLUCOSE SERPL-MCNC: 123 MG/DL (ref 65–140)
POTASSIUM SERPL-SCNC: 3.1 MMOL/L (ref 3.5–5.3)
SODIUM SERPL-SCNC: 142 MMOL/L (ref 135–147)

## 2023-02-14 RX ORDER — POTASSIUM CHLORIDE 20 MEQ/1
40 TABLET, EXTENDED RELEASE ORAL DAILY
Qty: 14 TABLET | Refills: 0 | Status: SHIPPED | OUTPATIENT
Start: 2023-02-15 | End: 2023-02-22

## 2023-02-14 RX ADMIN — POTASSIUM CHLORIDE 40 MEQ: 1500 TABLET, EXTENDED RELEASE ORAL at 08:29

## 2023-02-14 RX ADMIN — PREDNISONE 20 MG: 20 TABLET ORAL at 08:29

## 2023-02-14 RX ADMIN — PREDNISONE 10 MG: 20 TABLET ORAL at 08:29

## 2023-02-14 RX ADMIN — VANCOMYCIN HYDROCHLORIDE 125 MG: 125 CAPSULE ORAL at 04:55

## 2023-02-14 RX ADMIN — VANCOMYCIN HYDROCHLORIDE 125 MG: 125 CAPSULE ORAL at 10:18

## 2023-02-14 RX ADMIN — PANTOPRAZOLE SODIUM 40 MG: 40 TABLET, DELAYED RELEASE ORAL at 04:56

## 2023-02-14 RX ADMIN — CAPECITABINE 1650 MG: 500 TABLET, FILM COATED ORAL at 08:30

## 2023-02-14 RX ADMIN — CYANOCOBALAMIN TAB 500 MCG 1000 MCG: 500 TAB at 08:29

## 2023-02-14 NOTE — PLAN OF CARE
Problem: PAIN - ADULT  Goal: Verbalizes/displays adequate comfort level or baseline comfort level  Description: Interventions:  - Encourage patient to monitor pain and request assistance  - Assess pain using appropriate pain scale  - Administer analgesics based on type and severity of pain and evaluate response  - Implement non-pharmacological measures as appropriate and evaluate response  - Consider cultural and social influences on pain and pain management  - Notify physician/advanced practitioner if interventions unsuccessful or patient reports new pain  Outcome: Progressing     Problem: INFECTION - ADULT  Goal: Absence or prevention of progression during hospitalization  Description: INTERVENTIONS:  - Assess and monitor for signs and symptoms of infection  - Monitor lab/diagnostic results  - Monitor all insertion sites, i e  indwelling lines, tubes, and drains  - Monitor endotracheal if appropriate and nasal secretions for changes in amount and color  - [place] appropriate cooling/warming therapies per order  - Administer medications as ordered  - Instruct and encourage patient and family to use good hand hygiene technique  - Identify and instruct in appropriate isolation precautions for identified infection/condition  Outcome: Progressing  Goal: Absence of fever/infection during neutropenic period  Description: INTERVENTIONS:  - Monitor WBC    Outcome: Progressing

## 2023-02-14 NOTE — DISCHARGE INSTR - AVS FIRST PAGE
Dear Nerissa Mooney,     It was our pleasure to care for you here at [facility]  It is our hope that we were always able to exceed the expected standards for your care during your stay  You were hospitalized due to bleeding from your colostomy and colitis (inflammation of the colon)  You were cared for on the second floor by Balbina Rae MD under the service of Sharyle Crazier, MD with the [facility] who covers for your primary care physician (PCP), No primary care provider on file  , while you were hospitalized  If you have any questions or concerns related to this hospitalization, you may contact us at [phone]  For follow up as well as any medication refills, we recommend that you follow up with your primary care physician  A registered nurse will reach out to you by phone within a few days after your discharge to answer any additional questions that you may have after going home  However, at this time we provide for you here, the most important instructions / recommendations at discharge:     Notable Medication Adjustments -   You have been started on potassium supplementation  Take 40 mEq once a day for 7 days  You have been started on a vancomycin taper for your C  difficile infection  Take 1 capsule (125 mg total) by mouth 4 (four) times a day for 12 days, THEN 1 capsule (125 mg total) 2 (two) times a day for 7 days, THEN 1 capsule (125 mg total) in the morning for 7 days, THEN 1 capsule (125 mg total) every other day for 7 days, THEN 1 capsule (125 mg total) every 3 (three) days for 14 days  Do not start before February 14, 2023  Testing Required after Discharge -   You will need lab work in 1 week to check your potassium level (BMP)  Please follow-up with your PCP regarding this  A CT scan has been ordered for follow-up of your adrenal nodule in 12 months    Please follow-up with your PCP regarding this as well   Important follow up information -   Please follow-up with your PCP in 1 week regarding your hospital stay  Other Instructions -   Please return to the emergency department if you experience worsening blood in your stool  Please review this entire after visit summary as additional general instructions including medication list, appointments, activity, diet, any pertinent wound care, and other additional recommendations from your care team that may be provided for you        Sincerely,     Analisa Calloway MD

## 2023-02-14 NOTE — CASE MANAGEMENT
Case Management Discharge Planning Note    Patient name Nerissa Mooney  Location S /S -96 MRN [id number]  : 1965 Date 2023       Current Admission Date: 2/10/2023  Current Admission Diagnosis:Bleeding from colostomy [facility])   Patient Active Problem List    Diagnosis Date Noted   • Bleeding from colostomy [facility]) 2023   • Acute cystitis with hematuria 2023   • Adrenal nodule ([facility] ) 2023   • Sepsis ([facility] ) 2022   • Mal[facility] 2022   • History of Clostridium difficile colitis 2022   • Other hemochromatosis 2022   • Primary hypertension 2022   • Sarcoidosis 2022   • Impaired fasting glucose 2022   • Hypokalemia 2022   • Transaminitis 2022   • R flank pain with bilateral hydronephrosis 2022   • CKD (chronic kidney disease) 2022      LOS (days): 1  Geometric Mean LOS (GMLOS) (days):   Days to GMLOS:     OBJECTIVE:  Risk of Unplanned Readmission Score: 23 63         Current admission status: Inpatient   Preferred Pharmacy:   [facility]/pharmacy [address]  [address] [place] [address]  Phone: [phone] Fax: [facility]) [address] [address]  Phone: [phone] Fax: [phone]    Primary Care Provider: No primary care provider on file  [place] [employer]  Secondary Insurance:     DISCHARGE DETAILS:     CM met w/ patient regarding discharge today  She reports her ex- will pick her up once discharge papers are received  No discharge needs noted

## 2023-02-14 NOTE — UTILIZATION REVIEW
Continued Stay Review    Observation 2/11 0208 changed to inpatient 2/13 1624  Pt requiring continued stay for bleeding from colostomy bag with bright red blood noted  02/13/23 1624  Inpatient Admission  Once        Transfer Service: Hospitalist       Question Answer Comment   Level of Care Med Surg    Estimated length of stay More than 2 Midnights        02/13/23 1623   02/11/23 0208  Place in Observation  Once        Transfer Service: Hospitalist       Question: Level of Care Answer: Med Surg             Date: 2/13/23                        Current Patient Class: Inpatient  Current Level of Care: Med Surg    HPI:57 y o  female initially admitted on 2/13     Assessment/Plan: continue IV Protonix bid, monitor colostomy for further bleeding, daily CBC, hold DVT prophylaxis, C diff positive-vanco started  Continue monitoring potassium-start 40 mg KDur daily, continue home prednisone      Vital Signs:     Date/Time Temp Pulse Resp BP MAP (mmHg) SpO2   02/13/23 22:23:06 98 3 °F (36 8 °C) 82 18 124/81 95 97 %   02/13/23 15:33:41 98 5 °F (36 9 °C) 87 18 122/70 87 97 %   02/13/23 08:04:45 98 1 °F (36 7 °C) 84 -- 103/66 78 99 %       Pertinent Labs/Diagnostic Results:       Results from last 7 days   Lab Units 02/13/23  0815 02/12/23 2252 02/12/23 0644 02/11/23 2019 02/11/23  0931 02/11/23  0435 02/10/23  2220   WBC Thousand/uL 6 74  --  5 36  --   --  7 14 9 57   HEMOGLOBIN g/dL 10 8* 10 1* 10 5* 11 1* 11 4* 10 6* 12 9   HEMATOCRIT % 34 5*  --  32 0*  --   --  32 8* 38 1   PLATELETS Thousands/uL 109*  --  92*  --   --  101* 144*   NEUTROS ABS Thousands/µL  --   --   --   --   --  5 88 8 54*         Results from last 7 days   Lab Units 02/14/23  0511 02/13/23  0815 02/12/23  0644 02/11/23 2019 02/11/23  0931 02/11/23  0435 02/10/23  2220   SODIUM mmol/L 142 139 140  --   --  140 140   POTASSIUM mmol/L 3 1* 3 2* 3 3* 3 7 2 7* 2 6* 2 7*   CHLORIDE mmol/L 112* 110* 111*  --   --  107 104   CO2 mmol/L 25 24 23  --   -- 26 28   ANION GAP mmol/L 5 5 6  --   --  7 8   BUN mg/dL 13 9 8  --   --  12 12   CREATININE mg/dL 0 55* 0 52* 0 53*  --   --  0 48* 0 61   EGFR ml/min/1 73sq m 105 107 107  --   --  110 102   CALCIUM mg/dL 8 0* 8 1* 8 3*  --   --  8 1* 8 9   MAGNESIUM mg/dL  --   --   --   --   --  2 0  --      Results from last 7 days   Lab Units 02/11/23  0435 02/10/23  2220   AST U/L 38 47*   ALT U/L 64* 85*   ALK PHOS U/L 351* 426*   TOTAL PROTEIN g/dL 4 5* 5 7*   ALBUMIN g/dL 2 6* 3 2*   TOTAL BILIRUBIN mg/dL 1 35* 1 61*         Results from last 7 days   Lab Units 02/14/23  0511 02/13/23  0815 02/12/23  0644 02/11/23  0435 02/10/23  2220   GLUCOSE RANDOM mg/dL 123 106 93 92 166*           Results from last 7 days   Lab Units 02/11/23  0435 02/10/23  2220   PROTIME seconds 14 0 13 3   INR  1 06 0 99   PTT seconds 25 25           Results from last 7 days   Lab Units 02/11/23  0546   C DIFF TOXIN B BY PCR  Positive*     Results from last 7 days   Lab Units 02/11/23  0546   SALMONELLA SP PCR  None Detected   SHIGELLA SP/ENTEROINVASIVE E  COLI (EIEC)  None Detected   CAMPYLOBACTER SP (JEJUNI AND COLI)  None Detected   SHIGA TOXIN 1/SHIGA TOXIN 2  None Detected           Medications:   Scheduled Medications:  capecitabine (XELODA) 1,650 mg, 1,650 mg, Oral, BID  cyanocobalamin, 1,000 mcg, Oral, Daily  pantoprazole, 40 mg, Oral, Q12H  potassium chloride, 40 mEq, Oral, Daily  predniSONE, 10 mg, Oral, Daily  predniSONE, 20 mg, Oral, Daily  vancomycin oral, 125 mg, Oral, Q6H STANLEY    pantoprazole (PROTONIX) injection 40 mg  Dose: 40 mg  Freq: Every 12 hours scheduled Route: IV  Start: 02/11/23 0900 End: 02/13/23 1056    Continuous IV Infusions:     PRN Meds:  ondansetron, 4 mg, Intravenous, Q6H PRN        Discharge Plan: TBD    Network Utilization Review Department  ATTENTION: Please call with any questions or concerns to [phone] and carefully listen to the prompts so that you are directed to the right person   All voicemails are ton Ruano Bee all requests for admission clinical reviews, approved or denied determinations and any other requests to dedicated fax number below belonging to the campus where the patient is receiving treatment   List of dedicated fax numbers for the Facilities:  [address] DENIALS (Administrative/Medical Necessity) [phone]   [address] (Maternity/NICU/Pediatrics) [phone]   917 Linda Peterson [phone]   Chepebarbara Gonzales  [phone]   [address] 28 [phone]   15[address] [facility] 134 815 [facility] [phone]

## 2023-02-15 ENCOUNTER — OFFICE VISIT (OUTPATIENT)
Dept: INTERNAL MEDICINE CLINIC | Facility: CLINIC | Age: 58
End: 2023-02-15

## 2023-02-15 ENCOUNTER — DOCUMENTATION (OUTPATIENT)
Dept: HEMATOLOGY ONCOLOGY | Facility: CLINIC | Age: 58
End: 2023-02-15

## 2023-02-15 ENCOUNTER — TRANSITIONAL CARE MANAGEMENT (OUTPATIENT)
Dept: INTERNAL MEDICINE CLINIC | Facility: CLINIC | Age: 58
End: 2023-02-15

## 2023-02-15 VITALS
WEIGHT: 141.8 LBS | DIASTOLIC BLOOD PRESSURE: 78 MMHG | BODY MASS INDEX: 21.49 KG/M2 | SYSTOLIC BLOOD PRESSURE: 120 MMHG | OXYGEN SATURATION: 99 % | TEMPERATURE: 98.8 F | HEART RATE: 115 BPM | HEIGHT: 68 IN

## 2023-02-15 DIAGNOSIS — D86.9 SARCOIDOSIS: ICD-10-CM

## 2023-02-15 DIAGNOSIS — L98.9 SKIN LESION: ICD-10-CM

## 2023-02-15 DIAGNOSIS — D64.89 ANEMIA DUE TO OTHER CAUSE, NOT CLASSIFIED: ICD-10-CM

## 2023-02-15 DIAGNOSIS — K52.9 COLITIS: ICD-10-CM

## 2023-02-15 DIAGNOSIS — R74.01 TRANSAMINITIS: Primary | ICD-10-CM

## 2023-02-15 DIAGNOSIS — D86.0 PULMONARY SARCOIDOSIS (HCC): ICD-10-CM

## 2023-02-15 DIAGNOSIS — E83.118 OTHER HEMOCHROMATOSIS: ICD-10-CM

## 2023-02-15 DIAGNOSIS — E27.8 ADRENAL NODULE (HCC): ICD-10-CM

## 2023-02-15 DIAGNOSIS — C18.7 MALIGNANT NEOPLASM OF SIGMOID COLON (HCC): ICD-10-CM

## 2023-02-15 DIAGNOSIS — I34.0 NONRHEUMATIC MITRAL VALVE REGURGITATION: ICD-10-CM

## 2023-02-15 DIAGNOSIS — Z71.89 ADVANCE DIRECTIVE DISCUSSED WITH PATIENT: ICD-10-CM

## 2023-02-15 DIAGNOSIS — I10 PRIMARY HYPERTENSION: ICD-10-CM

## 2023-02-15 DIAGNOSIS — D69.6 THROMBOCYTOPENIA (HCC): ICD-10-CM

## 2023-02-15 DIAGNOSIS — R31.0 GROSS HEMATURIA: ICD-10-CM

## 2023-02-15 DIAGNOSIS — Z86.19 HISTORY OF CLOSTRIDIUM DIFFICILE COLITIS: ICD-10-CM

## 2023-02-15 DIAGNOSIS — K94.01 BLEEDING FROM COLOSTOMY STOMA (HCC): ICD-10-CM

## 2023-02-15 DIAGNOSIS — E87.6 HYPOKALEMIA: ICD-10-CM

## 2023-02-15 PROBLEM — N30.01 ACUTE CYSTITIS WITH HEMATURIA: Status: RESOLVED | Noted: 2023-01-06 | Resolved: 2023-02-15

## 2023-02-15 PROBLEM — A41.9 SEPSIS (HCC): Status: RESOLVED | Noted: 2022-12-25 | Resolved: 2023-02-15

## 2023-02-15 PROBLEM — N18.9 CKD (CHRONIC KIDNEY DISEASE): Status: RESOLVED | Noted: 2022-06-01 | Resolved: 2023-02-15

## 2023-02-15 LAB
BACTERIA UR QL AUTO: ABNORMAL /HPF
BILIRUB UR QL STRIP: NEGATIVE
CLARITY UR: CLEAR
COLOR UR: COLORLESS
GLUCOSE UR STRIP-MCNC: NEGATIVE MG/DL
HGB UR QL STRIP.AUTO: ABNORMAL
KETONES UR STRIP-MCNC: NEGATIVE MG/DL
LEUKOCYTE ESTERASE UR QL STRIP: ABNORMAL
NITRITE UR QL STRIP: NEGATIVE
NON-SQ EPI CELLS URNS QL MICRO: ABNORMAL /HPF
PH UR STRIP.AUTO: 7.5 [PH]
PROT UR STRIP-MCNC: ABNORMAL MG/DL
RBC #/AREA URNS AUTO: ABNORMAL /HPF
SP GR UR STRIP.AUTO: 1.01 (ref 1–1.03)
UROBILINOGEN UR STRIP-ACNC: <2 MG/DL
WBC #/AREA URNS AUTO: ABNORMAL /HPF

## 2023-02-15 NOTE — ASSESSMENT & PLAN NOTE
P/w with one day of bright red bleeding from her colostomy bag  The bleeding is painless  She also reports history of small amounts of blood but states that this bleeding filled her colostomy bag a quarter of the way  She did not notice any bloody stool in the days prior to this episode  She has history of recurrent C diff Colitis  Blood was bright read in bag on arrival;   Now colostomy has areas of brownish blood, no bright red blood  Most recent Colonoscopy, MRI AP, MRI pelvis 10/28 at [facility]:   "- Malignant partially obstructing tumor in the recto-sigmoid colon  Biopsied  Tattooed  - One 4 mm polyp in the descending colon  - One 10 mm polyp in the cecum   - The distal rectum and anal verge are normal on retroflexion view  MRI AP: 5 3 cm rectal mass involving the mid to upper rectum as described above  The mass abuts the posterior wall of the cervix with questionable early involvement of the cervical serosa as described above  MRI Pelvis: IMPRESSION:  Stage: T [3 c] N[0]  CRM: Clear  Sphincter involvement: No  Suspicious extra mesorectal lymph nodes: No"    Blood Pressure: 140/84    Recent Labs     02/12/23  0644 02/12/23  2252 02/13/23  0815   HGB 10 5* 10 1* 10 8*     CT abdomen pelvis with contrast    Result Date: 2/11/2023  Impression: Interval decrease in right-sided hydronephrosis  Thickening of the ascending colon and cecum which may represent colitis  Follow-up with gastroenterology  Stable 1 cm right adrenal gland nodule  Although its imaging features are indeterminate, it does not have suspicious imaging features (heterogeneity, necrosis, irregular margins), therefore this is likely benign, and can be followed by non-contrast abdomen CT or MRI in 12 months  If patient has history of adrenal hyperfunction, consider biochemical evaluation  Bleeding likely secondary to colitis seen on CT  Possibly inflammatory vs infectious   There could also be a UGI bleed however with current CT findings, this is less likely         Plan:  · Protonix IV 40mg BID  · Monitor colostomy for further bleeding  · Monitor VS  · Continue vancomycin taper for positive C  difficile-

## 2023-02-15 NOTE — ASSESSMENT & PLAN NOTE
Needs to establish care with new oncologist here  Previously was getting chemo then had severe reaction to oxaliplatin  Chemo has since paused  She had a visit with allergist earlier this month  Recommended desensitization vs  Change regimen  She needs to see oncologist asap to determine treatment plan

## 2023-02-15 NOTE — ASSESSMENT & PLAN NOTE
Shown on recent CT  Recommend repeat image, non-contrast CT or MRI in a year     Consider biochemical eval

## 2023-02-15 NOTE — ASSESSMENT & PLAN NOTE
States she was diagnosed with this  Liver biopsy showed hemochromatosis per pt  I do not have access to biopsy report  F/u with heme

## 2023-02-15 NOTE — DISCHARGE SUMMARY
[facility]  Discharge- Ashley Miller 1965, 56 y o  female MRN: [id number]  Unit/Bed#: S -01 Encounter: [id number]  Primary Care Provider: No primary care provider on file  Date and time admitted to hospital: 2/10/2023  9:40 PM    * Bleeding from colostomy [facility])  Assessment & Plan  P/w with one day of bright red bleeding from her colostomy bag  The bleeding is painless  She also reports history of small amounts of blood but states that this bleeding filled her colostomy bag a quarter of the way  She did not notice any bloody stool in the days prior to this episode  She has history of recurrent C diff Colitis  Blood was bright read in bag on arrival;   Now colostomy has areas of brownish blood, no bright red blood  Most recent Colonoscopy, MRI AP, MRI pelvis 10/28 at [facility]:   "- Malignant partially obstructing tumor in the recto-sigmoid colon  Biopsied  Tattooed  - One 4 mm polyp in the descending colon  - One 10 mm polyp in the cecum   - The distal rectum and anal verge are normal on retroflexion view  MRI AP: 5 3 cm rectal mass involving the mid to upper rectum as described above  The mass abuts the posterior wall of the cervix with questionable early involvement of the cervical serosa as described above  MRI Pelvis: IMPRESSION:  Stage: T [3 c] N[0]  CRM: Clear  Sphincter involvement: No  Suspicious extra mesorectal lymph nodes: No"    Blood Pressure: 140/84    Recent Labs     02/12/23  0644 02/12/23  2252 02/13/23  0815   HGB 10 5* 10 1* 10 8*     CT abdomen pelvis with contrast    Result Date: 2/11/2023  Impression: Interval decrease in right-sided hydronephrosis  Thickening of the ascending colon and cecum which may represent colitis  Follow-up with gastroenterology  Stable 1 cm right adrenal gland nodule  Although its imaging features are indeterminate, it does not have suspicious imaging features (heterogeneity, necrosis, irregular margins), therefore this is likely benign, and can be followed by non-contrast abdomen CT or MRI in 12 months  If patient has history of adrenal hyperfunction, consider biochemical evaluation  Bleeding likely secondary to colitis seen on CT  Possibly inflammatory vs infectious  There could also be a UGI bleed however with current CT findings, this is less likely  Plan:  · Protonix IV 40mg BID  · Monitor colostomy for further bleeding  · Monitor VS  · Continue vancomycin taper for positive C  difficile-    CKD (chronic kidney disease)  Assessment & Plan  Lab Results   Component Value Date    EGFR 105 02/14/2023    EGFR 107 02/13/2023    EGFR 107 02/12/2023    CREATININE 0 55 (L) 02/14/2023    CREATININE 0 52 (L) 02/13/2023    CREATININE 0 53 (L) 02/12/2023     Creatinine currently stable  Non-ulcerogenic diet  Monitor CMP    Adrenal nodule ([address] )  Assessment & Plan  CT abdomen pelvis with contrast    Result Date: 2/11/2023  Impression: Stable 1 cm right adrenal gland nodule  Although its imaging features are indeterminate, it does not have suspicious imaging features (heterogeneity, necrosis, irregular margins), therefore this is likely benign, and can be followed by non-contrast abdomen CT or MRI in 12 months  If patient has history of adrenal hyperfunction, consider biochemical evaluation  Adrenal nodule is again noted on CT  Pt should have follow CT or MRI in 12 months as recommended  Monitor for acute changes in symptoms  Plan:  CT follow-up in 12 months    Transaminitis  Assessment & Plan  Possibly secondary to hepatic steatosis, hemochromatosis history      Hypokalemia  Assessment & Plan  Lab Results   Component Value Date    K 3 1 (L) 02/14/2023    K 3 2 (L) 02/13/2023    K 3 3 (L) 02/12/2023       Pt has chronic hypokalemia and takes 10mg potassium chloride daily at home    POA: K+ found to be 2 7 which was repleted    Plan:  · Continue monitoring and replenish as needed  · We will start on 40 mg KDur p o  daily for 1 week follow-up BMP    Sarcoidosis  Assessment & Plan  Pt has history of sarcoidosis and follows with [facility] for treatment; has been stable    Plan:   Continue prednisone 30mg daily; pt reports she takes both her 10mg and 20mg doses in the AM    Primary hypertension  Assessment & Plan  BP Readings from Last 3 Encounters:   02/14/23 140/84   01/27/23 137/77   01/23/23 162/78     Pt does not currently take medications to control BP  Previously was on amlodipine in 2022  Monitor BP while admitted  Follow-up with PCP    History of Clostridium difficile colitis  Assessment & Plan  Pt with history of recurrent C Diff  Presents with signs of colitis on CT and change in stool in stoma with blood in colostomy bag  Plan:   · C diff toxin positive  · Contact and hand hygien started on e isolation   · Monitor stool output  · Continue Oral vancomycin taper - 125 mg 4x daily for 14 days, then 125 mg BID x 7 days, 125 mg daily x 7 days, 125 mg every other day x 7 days, and then 125 mg every 3 days for 14 days  Malignant neoplasm of sigmoid colon [facility])  Assessment & Plan  Pt has active colorectal cancer and currently takes capecitabine 1650mg twice daily  She is seen at [facility] in [place], [place] by Dr Citlaly Tapia for treatment  S/P diverting colostomy    Plan:   Continue capecitabine BID      Medical Problems     Resolved Problems  Date Reviewed: 2/12/2023   None       Discharging Resident: Analisa Calloway MD  Discharging Attending: Renetta Almeida MD  PCP: No primary care provider on file    Admission Date:   Admission Orders (From admission, onward)     Ordered        02/13/23 1623  Inpatient Admission  Once            02/11/23 0207  Place in Observation  Once                      Discharge Date: 2/14/2023    Consultations During Hospital Stay:  · Gastroenterology    Procedures Performed:   · None    Significant Findings / Test Results:   Potassium of 2 7 on admission    CT abdomen pelvis with contrast    Result Date: 2/11/2023  Impression: Interval decrease in right-sided hydronephrosis  Thickening of the ascending colon and cecum which may represent colitis  Follow-up with gastroenterology  Stable 1 cm right adrenal gland nodule  Although its imaging features are indeterminate, it does not have suspicious imaging features (heterogeneity, necrosis, irregular margins), therefore this is likely benign, and can be followed by non-contrast abdomen CT or MRI in 12 months  If patient has history of adrenal hyperfunction, consider biochemical evaluation  The study was marked in [facility] for immediate notification  Workstation performed: [id number]       No Chest XR results available for this patient  Incidental Findings:   · Adrenal nodule  · I reviewed the above mentioned incidental findings with the patient and/or family and they expressed understanding  Test Results Pending at Discharge (will require follow up): · None     Outpatient Tests Requested:  · BMP in 1 week    Complications: None    Reason for Admission: Bleeding from colostomy likely colitis    Hospital Course:   Kilo Salas is a 56 y o  female patient who originally presented to the hospital on 2/10/2023 due to bleeding from her colostomy  She noted small amounts of blood initially in her colostomy bag  This then began to become bright red and fell out approximately one quarter of her back  She noted a change in the timing of her colostomy bag feeling with stool  Patient states her bag was filled with stool in the morning although she generally does not experience this  Patient also noted having small amounts of stool per rectum even with diverting colostomy  Patient's bleeding eventually subsided and resolved during her hospital stay without intervention  Patient was also noted to have hypokalemia at 2 7 on admission  This was repleted in the emergency department    She continued to have hypokalemia during her stay here and was discharged with 40 mEq for 1 week with follow-up BMP  Patient also has history of C  difficile and was found to have positive C  difficile toxin, so she was started on vancomycin taper by gastroenterology  Patient denied worsening or increased stool output and had no abdominal pain or cramping  Patient was discharged home in stable condition  Please see above list of diagnoses and related plan for additional information  Condition at Discharge: stable    Discharge Day Visit / Exam:   Subjective: Patient was seen at bedside  She denied any further episodes of nausea overnight and states she was feeling well today  She denied any abdominal pain, shortness of breath, chest pain, nausea, vomiting, fever, or chills  Vitals: Blood Pressure: 140/84 (02/14/23 0721)  Pulse: 89 (02/14/23 0721)  Temperature: 98 1 °F (36 7 °C) (02/14/23 0721)  Temp Source: Oral (02/11/23 2158)  Respirations: 18 (02/14/23 0721)  Weight - Scale: 65 4 kg (144 lb 2 9 oz) (02/10/23 2206)  SpO2: 97 % (02/14/23 0721)  Exam:   Physical Exam  Vitals and nursing note reviewed  Constitutional:       General: She is not in acute distress  Appearance: She is well-developed  HENT:      Head: Normocephalic and atraumatic  Eyes:      Conjunctiva/sclera: Conjunctivae normal    Cardiovascular:      Rate and Rhythm: Normal rate and regular rhythm  Heart sounds: No murmur heard  Pulmonary:      Effort: Pulmonary effort is normal  No respiratory distress  Breath sounds: Normal breath sounds  Abdominal:      Palpations: Abdomen is soft  Tenderness: There is no abdominal tenderness  Comments: Stoma evaluated, light-brown stool seen in the ostomy bag   Musculoskeletal:         General: No swelling  Cervical back: Neck supple  Skin:     General: Skin is warm and dry  Capillary Refill: Capillary refill takes less than 2 seconds  Neurological:      Mental Status: She is alert     Psychiatric:         Mood and Affect: Mood normal           Discussion with Family: Patient declined call to   Discharge instructions/Information to patient and family:   See after visit summary for information provided to patient and family  Provisions for Follow-Up Care:  See after visit summary for information related to follow-up care and any pertinent home health orders  Disposition:   Home    Planned Readmission: None    Discharge Medications:  See after visit summary for reconciled discharge medications provided to patient and/or family        **Please Note: This note may have been constructed using a voice recognition system**

## 2023-02-15 NOTE — ASSESSMENT & PLAN NOTE
Lab Results   Component Value Date    K 3 1 (L) 02/14/2023    K 3 2 (L) 02/13/2023    K 3 3 (L) 02/12/2023       Pt has chronic hypokalemia and takes 10mg potassium chloride daily at home    POA: K+ found to be 2 7 which was repleted    Plan:  · Continue monitoring and replenish as needed  · We will start on 40 mg KDur p o  daily for 1 week follow-up BMP

## 2023-02-15 NOTE — ASSESSMENT & PLAN NOTE
Diagnosed in 2022 biopsy proven  On prednisone  Need to establish care with pulmonary here  If decided to continue steroid, need to be on PCP ppx

## 2023-02-15 NOTE — ASSESSMENT & PLAN NOTE
Discussed with pt  She does not have living will  5 wishes reviewed and offered to pt     She wants her ex- Jaguar Menon to be her POA

## 2023-02-15 NOTE — ASSESSMENT & PLAN NOTE
Lab Results   Component Value Date    EGFR 105 02/14/2023    EGFR 107 02/13/2023    EGFR 107 02/12/2023    CREATININE 0 55 (L) 02/14/2023    CREATININE 0 52 (L) 02/13/2023    CREATININE 0 53 (L) 02/12/2023     Creatinine currently stable  Non-ulcerogenic diet  Monitor CMP

## 2023-02-15 NOTE — ASSESSMENT & PLAN NOTE
CT abdomen pelvis with contrast    Result Date: 2/11/2023  Impression: Stable 1 cm right adrenal gland nodule  Although its imaging features are indeterminate, it does not have suspicious imaging features (heterogeneity, necrosis, irregular margins), therefore this is likely benign, and can be followed by non-contrast abdomen CT or MRI in 12 months  If patient has history of adrenal hyperfunction, consider biochemical evaluation  Adrenal nodule is again noted on CT  Pt should have follow CT or MRI in 12 months as recommended  Monitor for acute changes in symptoms      Plan:  CT follow-up in 12 months

## 2023-02-15 NOTE — ASSESSMENT & PLAN NOTE
Pt has active colorectal cancer and currently takes capecitabine 1650mg twice daily  She is seen at [facility] in [place], [place] by Dr Tyson Dang for treatment  S/P diverting colostomy    Plan:   Continue capecitabine BID

## 2023-02-15 NOTE — ASSESSMENT & PLAN NOTE
Pt has history of sarcoidosis and follows with [facility] for treatment; has been stable    Plan:   Continue prednisone 30mg daily; pt reports she takes both her 10mg and 20mg doses in the AM

## 2023-02-15 NOTE — ASSESSMENT & PLAN NOTE
She continues to have some bleeding from colostomy bag  CT showed sign of colitis  C diff positive  She was started on vanco taper for c diff colitis     F/u with GI

## 2023-02-15 NOTE — ASSESSMENT & PLAN NOTE
BP Readings from Last 3 Encounters:   02/14/23 140/84   01/27/23 137/77   01/23/23 162/78     Pt does not currently take medications to control BP  Previously was on amlodipine in 2022     Monitor BP while admitted  Follow-up with PCP

## 2023-02-15 NOTE — ASSESSMENT & PLAN NOTE
Pt with history of recurrent C Diff  Presents with signs of colitis on CT and change in stool in stoma with blood in colostomy bag  Plan:   · C diff toxin positive  · Contact and hand hygien started on e isolation   · Monitor stool output  · Continue Oral vancomycin taper - 125 mg 4x daily for 14 days, then 125 mg BID x 7 days, 125 mg daily x 7 days, 125 mg every other day x 7 days, and then 125 mg every 3 days for 14 days

## 2023-02-15 NOTE — ASSESSMENT & PLAN NOTE
During this hospitalization cdiff toxin positive with sign of colitis on CT  This is 2nd episoe of c diff  On vanco taper course

## 2023-02-15 NOTE — PROGRESS NOTES
Name: Anson Vazquez      : 1965      MRN: [id number]  Encounter Provider: Mauricio Stokes MD  Encounter Date: 2/15/2023   Enc[facility] department: [facility]    Assessment & Plan     1  Transaminitis  Assessment & Plan:  Had liver biopsy   I don't have access to biopsy report but per pt it showed hemochromatosis  CT this time did not show lesion  F/u with GI      2  Pulmonary sarcoidosis ([facility] [address] )  -     Ambulatory Referral to Pulmonology; Future    3  Gross hematuria  Assessment & Plan:  Hx kidney stone s/p stent  F/u with urology    Orders:  -     Ambulatory Referral to Urology; Future  -     Urinalysis with microscopic    4  Malignant neoplasm of sigmoid colon [facility])  Assessment & Plan:  Needs to establish care with new oncologist here  Previously was getting chemo then had severe reaction to oxaliplatin  Chemo has since paused  She had a visit with allergist earlier this month  Recommended desensitization vs  Change regimen  She needs to see oncologist asap to determine treatment plan  Orders:  -     Ambulatory Referral to Hematology / Oncology; Future    5  Colitis  -     Ambulatory referral to Gastroenterology; Future    6  Skin lesion  -     Ambulatory Referral to Dermatology; Future    7  Primary hypertension  Assessment & Plan:  Currently under control not on meds  Kisha Chauncey mentioned previously BP was over 200s when she was in hosiptal    Monitor  8  Nonrheumatic mitral valve regurgitation  Assessment & Plan:  Mild mitral regurg shown in Echo         9  Thrombocytopenia ([facility] [address] )  Assessment & Plan:  contniue to monitor  F/u with heme      10  Bleeding from colostomy stoma [facility])  Assessment & Plan:  She continues to have some bleeding from colostomy bag  CT showed sign of colitis  C diff positive  She was started on vanco taper for c diff colitis  F/u with GI      11  Anemia due to other cause, not classified  Assessment & Plan:  Chronic anemia     Likely 2/2 blood loss and chemo  Continue to monitor  F/u with heme      12  Advance directive discussed with patient  Assessment & Plan:  Discussed with pt  She does not have living will  5 wishes reviewed and offered to pt  She wants her ex- Delfino Johnson to be her POA      13  Adrenal nodule [facility])  Assessment & Plan:  Shown on recent CT  Recommend repeat image, non-contrast CT or MRI in a year  Consider biochemical eval          14  Hypokalemia  Assessment & Plan:  Continue oral K  F/u in 1w      15  Sarcoidosis  Assessment & Plan:  Diagnosed in 2022 biopsy proven  On prednisone  Need to establish care with pulmonary here  If decided to continue steroid, need to be on PCP ppx  16  Other hemochromatosis  Assessment & Plan:  States she was diagnosed with this  Liver biopsy showed hemochromatosis per pt  I do not have access to biopsy report  F/u with heme  17  History of Clostridium difficile colitis  Assessment & Plan:  During this hospitalization cdiff toxin positive with sign of colitis on CT  This is 2nd episoe of c diff  On vanco taper course  TCM Call     Date and time call was made  2/15/2023 11:37 AM    Hospital care reviewed  Records not available    Patient was hospitialized at  [address] [place] [address]    Date of Admission  02/10/23    Date of discharge  02/14/23    Diagnosis  Bleeding from colostomy [facility])    Disposition  Home    Were the patients medications reviewed and updated  No      TCM Call     Should patient be enrolled in anticoag monitoring? No    Scheduled for follow up?   Yes    I have advised the patient to call PCP with any new or worsening symptoms  Yossi Guajardo - MR/MA            Subjective      HPI   New pt establish care  TCM  2/10-2/14  bleeding from colostomy and rectal bleeding  self stopped without intervention  hypokelamie, repleted, continues to be low, discharged with oral K BMP in 1w  hx c diff, positive c diff toxin, started on vanco taper    Discharged yesterday      Has Toothache  Blood in urine  Still has small amount of blood in ostomy bag    Just moved from [place]  Previously getting all her care in [place]  Was living with ex  has 2 daughters  Currently lives in [place] with ex-  Hx colon cancer s/p diverting colostomy, sarcoidosis involving lung, recurrent c diff infection, kidney stone s/p stent, adrenal nodule, anemia, thrombycytopenia, HTN  Reports fatigue, low energy since starting cancer treatment  DAMICO  Had normal stress test and Echo EF 55% in 2022  NM Stress Test: Normal Lexiscan stress SPECT Myoview perfusion study  Symptomatic as above  No chest pain  Nondiagnostic EKG response      Echo: Low-normal left ventricular ejection fraction  EF ~55%  The inferior wall may be mildly hypokinetic in some views  Mild mitral valve regurgitation              Review of Systems    Current Outpatient Medications on File Prior to Visit   Medication Sig   • acetaminophen (TYLENOL) 325 mg tablet Take 650 mg by mouth every 6 (six) hours as needed for mild pain   • CAPECITABINE PO Take 1,650 mg by mouth 2 (two) times a day   • cyanocobalamin (VITAMIN B-12) 1000 MCG tablet Take 1,000 mcg by mouth daily   • diclofenac sodium (VOLTAREN) 50 mg EC tablet Take 1 tablet (50 mg total) by mouth 3 (three) times a day for 7 days   • docusate sodium (COLACE) 100 mg capsule Take 1 capsule (100 mg total) by mouth 2 (two) times a day for 15 days   • potassium chloride (K-DUR,KLOR-CON) 20 mEq tablet Take 2 tablets (40 mEq total) by mouth daily for 7 days Do not start before February 15, 2023     • predniSONE 10 mg tablet Take 10 mg by mouth daily at bedtime   • predniSONE 20 mg tablet Take 20 mg by mouth in the morning Takes in am   • vancomycin (VANCOCIN) 125 MG capsule Take 1 capsule (125 mg total) by mouth 4 (four) times a day for 12 days, THEN 1 capsule (125 mg total) 2 (two) times a day for 7 days, THEN 1 capsule (125 mg total) in the morning for 7 days, THEN 1 capsule (125 mg total) every other day for 7 days, THEN 1 capsule (125 mg total) every 3 (three) days for 14 days  Do not start before February 14, 2023     • POTASSIUM PO Take by mouth (Patient not taking: Reported on 2/15/2023)   • tamsulosin (FLOMAX) 0 4 mg Take 1 capsule (0 4 mg total) by mouth daily at bedtime (Patient not taking: Reported on 2/15/2023)       Objective     /78 (BP Location: Left arm, Patient Position: Sitting, Cuff Size: Adult)   Pulse (!) 115   Temp 98 8 °F (37 1 °C) (Probe)   Ht 5' 8" (1 727 m)   Wt 64 3 kg (141 lb 12 8 oz)   SpO2 99%   BMI 21 56 kg/m²     Physical Exam  Henry Rasheed MD

## 2023-02-15 NOTE — ASSESSMENT & PLAN NOTE
Had liver biopsy 2022  I don't have access to biopsy report but per pt it showed hemochromatosis  CT this time did not show lesion   F/u with GI

## 2023-02-15 NOTE — ASSESSMENT & PLAN NOTE
Currently under control not on meds  Ronak Raines mentioned previously BP was over 200s when she was in hosiptal    Monitor

## 2023-02-16 ENCOUNTER — DOCUMENTATION (OUTPATIENT)
Dept: HEMATOLOGY ONCOLOGY | Facility: CLINIC | Age: 58
End: 2023-02-16

## 2023-02-16 DIAGNOSIS — C20 RECTAL CANCER (HCC): Primary | ICD-10-CM

## 2023-02-16 NOTE — PROGRESS NOTES
Intake received, chart reviewed for need of external records      Outside records requested from:[facility]   Pathology completed on:10/28/2022  Fax:[phone]  Phone:[phone]  Imaging completed on:N/A    Fax:  Phone:

## 2023-02-17 ENCOUNTER — DOCUMENTATION (OUTPATIENT)
Dept: HEMATOLOGY ONCOLOGY | Facility: CLINIC | Age: 58
End: 2023-02-17

## 2023-02-17 NOTE — PROGRESS NOTES
Xander'paty msg from Rosi Hughes asking about when to get this pt scheduled  Pt is coming from [place] and pt seeking radiation therapy  Pt had a rxn to Oxaliplatin and physician is recommending the pt be seen by rad onc to [facility] her rectal CA  Navigation to f/u on all imaging and slides needing to be sent to [facility]

## 2023-02-20 ENCOUNTER — DOCUMENTATION (OUTPATIENT)
Dept: HEMATOLOGY ONCOLOGY | Facility: CLINIC | Age: 58
End: 2023-02-20

## 2023-02-20 NOTE — PROGRESS NOTES
Pathology slides received: 02/20/23  From [facility] via [employer]    Dx: C20 (ICD-10-CM) - Rectal cancer [facility])    Forwarded to Angela Gresham in Pathology Department for processing via interoffice mail
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
## 2023-02-21 ENCOUNTER — CONSULT (OUTPATIENT)
Dept: GASTROENTEROLOGY | Facility: AMBULARY SURGERY CENTER | Age: 58
End: 2023-02-21

## 2023-02-21 VITALS
BODY MASS INDEX: 26.09 KG/M2 | HEIGHT: 62 IN | WEIGHT: 141.8 LBS | SYSTOLIC BLOOD PRESSURE: 124 MMHG | HEART RATE: 94 BPM | DIASTOLIC BLOOD PRESSURE: 72 MMHG | OXYGEN SATURATION: 98 %

## 2023-02-21 DIAGNOSIS — R74.8 ELEVATED LIVER ENZYMES: ICD-10-CM

## 2023-02-21 DIAGNOSIS — C18.7 MALIGNANT NEOPLASM OF SIGMOID COLON (HCC): Primary | ICD-10-CM

## 2023-02-21 DIAGNOSIS — A04.71 RECURRENT CLOSTRIDIUM DIFFICILE DIARRHEA: ICD-10-CM

## 2023-02-21 RX ORDER — ONDANSETRON HYDROCHLORIDE 8 MG/1
TABLET, FILM COATED ORAL
COMMUNITY
Start: 2022-11-15

## 2023-02-21 RX ORDER — FAMOTIDINE 20 MG/1
20 TABLET, FILM COATED ORAL 2 TIMES DAILY
COMMUNITY
Start: 2023-01-10

## 2023-02-21 RX ORDER — AMLODIPINE BESYLATE 5 MG/1
5 TABLET ORAL DAILY
COMMUNITY
Start: 2022-11-03

## 2023-02-21 RX ORDER — LANOLIN ALCOHOL/MO/W.PET/CERES
1 CREAM (GRAM) TOPICAL 2 TIMES DAILY
COMMUNITY
Start: 2022-11-15

## 2023-02-21 RX ORDER — OXYCODONE HYDROCHLORIDE AND ACETAMINOPHEN 5; 325 MG/1; MG/1
TABLET ORAL
COMMUNITY
Start: 2022-11-27

## 2023-02-21 RX ORDER — PROCHLORPERAZINE MALEATE 10 MG
TABLET ORAL
COMMUNITY
Start: 2022-11-15

## 2023-02-21 NOTE — PROGRESS NOTES
[facility] - Outpatient Follow-up Note  Dayton Reyes 56 y o  female MRN: [id number]  Encounter: [id number]          ASSESSMENT AND PLAN:      1  Recurrent C  difficile colitis  Patient with recent diagnosis of near obstructing rectosigmoid mass 12/2022 status post diverting colostomy who recently moved from [place] recently on chemotherapy  She had C  difficile infection 12/2022 and presented to the ER this month found to have recurrent infection and placed on extended vancomycin taper  She is doing well at this time with no high output and no blood in the ostomy     -Extended vancomycin taper of 125 mg 4 times daily, 125 mg twice daily for a week then 125 mg daily for a week then 125 mg every other day for 1 week then 125 mg every 3 days for 2 weeks   -Continue to monitor stool output and for any blood in stool   - Advised patient to reach out with any fevers or chills or abdominal pain  2   History of colon cancer, rectosigmoid  3  Prolapse of ostomy  Patient is status post diverting colostomy on chemotherapy with plans for radiation  Previously following with provider in [place]  She is now establishing care with providers here and has radiation oncology, hematology oncology referrals and appointments within the next month  She reports some protrusion of her ostomy however it appears healthy and pink     -Patient will be establishing care with Dr Janine Marcelo next month  Also has upcoming appointment for radiation oncology later this week  -I placed a referral for colorectal as well due to patients concerns with appearance of ostomy  4   Elevated liver enzymes  Patient with fluctuating elevation for the last few months  Upon chart review, during her diverting colostomy she had grossly cirrhotic appearing liver with many nodules throughout the liver    The biopsies were very unremarkable, especially negative for malignancy however did show 2+ iron deposition consistent with hemochromatosis  He has severe sarcoidosis of the lungs and is on steroids  INR was normal   Biopsy was relatively unrevealing besides hemochromatosis, imaging has been without signs of liver metastasis or hepatic sarcoidosis  -We will repeat liver enzymes now to establish a baseline outside of the hospital to see if any improvement  - I recommend patient follow-up with liver specialist at follow-up  - Also recommend continuing follow-up with oncology  - May consider right upper quadrant ultrasound if liver enzymes especially alkaline phosphatase are worsening  Follow up in 2-3 months   ______________________________________________________________________    SUBJECTIVE:      Flower Anderson is a 59-year-old female with history of rectal cancer status post lap diverting colostomy on chemotherapy with plans for radiation, pulmonary sarcoidosis on prednisone as an outpatient who was admitted to the hospital recently for blood in the stoma  Patient presents with her ex-, Estefani Sharma today  During hospital admission blood in the stoma was thought to be secondary to chemo versus infection  She had infectious studies which were positive for C  difficile  She was recommended vancomycin taper  Patient reports she has been doing well from a GI standpoint since discharge  She denies any further blood in the output  She denies any high volume output from her ostomy  She denies any fevers, chills or abdominal pain  She reports her appetite is normal and no unintentional weight loss  She is following vancomycin taper as recommended  Patient has had fluctuating elevation of liver enzymes since 12/2022  On discharge they were slightly improving with normalization of AST  ALT 64, alkaline phosphatase 351 with total bilirubin of 1 35  INR normal   She did have thrombocytopenia of 109    Patient and her ex- reported that they believe at the time of her surgery for colon cancer it was noted that she had an abnormal appearing liver and therefore underwent a biopsy which they report showed hemochromatosis however unsure of any other findings  After patient's office visit, further investigation with chart review found the following information:  FINDINGS:  Grossly cirrhotic appearing liver with a micronodular pattern and blunted edges  Over 100 small (1 to 3 mm) light-colored nodules in a miliary pattern throughout both lobes of the liver  Approximately 2 cm x 1 5 cm portion of segment 3 of the liver containing multiple lesions was excised  SPECIMEN REMOVED:  Wedge of segment 3 of liver  COMPLICATIONS:  No complications were noted  ANESTHESIA:  General endotracheal tube  IMPLANTS:  None  INDICATIONS:  Patient is a 57-year-old woman with history of severe sarcoidosis of the lung who was discovered to have a near obstructing rectal cancer  Dr Debra Soto was in the process of performing a laparoscopic diverting loop colostomy when she noticed the liver looked grossly abnormal and had numerous lesions throughout  I was asked for an intraoperative consultation to evaluate the liver and possibly perform a liver biopsy  TECHNIQUE:  At the time I entered the operating room, the patient was intubated and under general anesthesia  Dr Cleveland Downing was in the midst of performing the laparoscopic diverting loop colostomy  Using the existing laparoscopic ports the liver was evaluated  The liver was firm and rubbery with blunted edges  There was micronodular pattern to the surface of the liver consistent with cirrhosis  There were no gross signs of portal hypertension  There were several 100 small light-colored nodules scattered in a miliary pattern involving both lobes of the liver  Given the abnormal appearance of the liver (cirrhosis) and the patient's diagnosis of rectal cancer, the decision was made to perform a wedge biopsy   Roughly 2 cm x 1 5 cm portion of segment 3 of the liver was wedged out using the harmonic scalpel  This piece of the liver contained multiple light-colored liver lesions  The argon beam coagulator was used to achieve hemostasis  Specimen was removed via the incision chosen for the colostomy and sent to pathology for analysis  The patient tolerated this portion of the operation well  Dr Zach Vu seated with the remainder of the diverting colostomy operation  Pathology from liver then showed: FINAL PATHOLOGIC DIAGNOSIS LIVER, SEGMENT 3, RESECTION:  - HYALINIZED SUBCAPSULAR AND INTRA-PARENCHYMAL NODULES  NO MALIGNANCY SEEN  SEE NOTE  - MILD MACROVESICULAR STEATOSIS (25%)  NO EVIDENCE OF STEATOHEPATITIS  TRICHOME STAIN SHOWS MILD PORTAL FIBROSIS    - 2+ IRON DEPOSITION WITHIN KUPFFER CELLS (IRON STAIN), CONSISTENT WITH SECONDARY HEMOCHROMATOSIS  NOTE: THIS LIKELY REPRESENTS A MARKEDLY SCLEROTIC HEMANGIOMA OR CHANGES SECONDARY TO INJURY  REVIEW OF SYSTEMS IS OTHERWISE NEGATIVE        Historical Information   Past Medical History:   Diagnosis Date   • Cancer [facility])    • Colon cancer ([facility] [address] )    • Fall    • Headache    • Hemochromatosis, unspecified    • History of transfusion     2022 - no adverse reaction   • Kidney calculi    • Kidney stone    • Liver disease     hemangioma   • Muscle weakness    • Sarcoidosis    • Seizures ([facility] [address] )     2022     Past Surgical History:   Procedure Laterality Date   • ABSCESS DRAINAGE      left breast   • CHEST TUBE INSERTION     • COLON SURGERY      colostomy   • COLONOSCOPY     • FL RETROGRADE PYELOGRAM  1/23/2023   • LUNG BIOPSY     • HI CYSTO/URETERO W/LITHOTRIPSY &INDWELL STENT INSRT Bilateral 1/23/2023    Procedure: CYSTOSCOPY  RIGHT URETEROSCOPY WITH LITHOTRIPSY HOLMIUM LASER, BILATERAL RETROGRADE PYELOGRAM AND BILATERAL  URETERAL STENT INSERTION;  Surgeon: Ángel Torres MD;  Location: [facility];  Service: Urology   • TONSILLECTOMY     • URINARY SURGERY Bilateral     bilateral stents     Social History   Social History     Substance and Sexual Activity   Alcohol Use Never     Social History     Substance and Sexual Activity   Drug Use Never     Social History     Tobacco Use   Smoking Status Never   • Passive exposure: Past   Smokeless Tobacco Never     No family history on file  Meds/Allergies       Current Outpatient Medications:   •  acetaminophen (TYLENOL) 325 mg tablet  •  amLODIPine (NORVASC) 5 mg tablet  •  CAPECITABINE PO  •  cyanocobalamin (VITAMIN B-12) 1000 MCG tablet  •  famotidine (PEPCID) 20 mg tablet  •  ferrous sulfate 325 (65 FE) MG EC tablet  •  ondansetron (ZOFRAN) 8 mg tablet  •  oxyCODONE-acetaminophen (PERCOCET) 5-325 mg per tablet  •  potassium chloride (K-DUR,KLOR-CON) 20 mEq tablet  •  predniSONE 10 mg tablet  •  predniSONE 20 mg tablet  •  prochlorperazine (COMPAZINE) 10 mg tablet  •  vancomycin (VANCOCIN) 125 MG capsule  •  diclofenac sodium (VOLTAREN) 50 mg EC tablet  •  docusate sodium (COLACE) 100 mg capsule  •  POTASSIUM PO  •  tamsulosin (FLOMAX) 0 4 mg    Allergies   Allergen Reactions   • Oxaliplatin Shortness Of Breath     Reactions occurred with 2nd and 3rd infusions (about 1-3 hours from initiation of infusion) and required treatment with steroids and antihistamines  Please refer to allergy note on 2/7/2023 for detailed description of her reactions  Objective     Blood pressure 124/72, pulse 94, height 5' 2" (1 575 m), weight 64 3 kg (141 lb 12 8 oz), SpO2 98 %  Body mass index is 25 94 kg/m²  PHYSICAL EXAM:      General Appearance:   Alert, cooperative, no distress   HEENT:   Normocephalic, atraumatic, anicteric      Neck:  Supple, symmetrical, trachea midline   Lungs:   Clear to auscultation bilaterally; no rales, rhonchi or wheezing; respirations unlabored    Heart[de-identified]   Regular rate and rhythm; no murmur, rub, or gallop  Abdomen:   + Protuberant abdomen however soft and nontender throughout  Ostomy noted  Stoma appears healthy and pink    No output noted   Genitalia:   Deferred    Rectal:   Deferred    Extremities:  No cyanosis, clubbing or edema    Pulses:  2+ and symmetric    Skin:  No jaundice, rashes, or lesions    Lymph nodes:  No palpable cervical lymphadenopathy        Lab Results:   No visits with results within 1 Day(s) from this visit  Latest known visit with results is:   Office Visit on 02/15/2023   Component Date Value   • Color, UA 02/15/2023 Colorless    • Clarity, UA 02/15/2023 Clear    • Specific Gravity, UA 02/15/2023 1 006    • pH, UA 02/15/2023 7 5    • Leukocytes, UA 02/15/2023 Small (A)    • Nitrite, UA 02/15/2023 Negative    • Protein, UA 02/15/2023 30 (1+) (A)    • Glucose, UA 02/15/2023 Negative    • Ketones, UA 02/15/2023 Negative    • Urobilinogen, UA 02/15/2023 <2 0    • Bilirubin, UA 02/15/2023 Negative    • Occult Blood, UA 02/15/2023 Large (A)    • RBC, UA 02/15/2023 4-10 (A)    • WBC, UA 02/15/2023 4-10 (A)    • Epithelial Cells 02/15/2023 Occasional    • Bacteria, UA 02/15/2023 Occasional          Radiology Results:   FL retrograde pyelogram    Result Date: 1/27/2023  Narrative: BILATERAL RETROGRADE PYELOGRAM INDICATION:   Bilateral ureteral calculi N20 1  COMPARISON: Abdomen and pelvic CT from 1/6/2023  IMAGES:  32 FLUOROSCOPY TIME:   5 minutes 48 seconds CONTRAST: 30 mL of iohexol (OMNIPAQUE) FINDINGS: Fluoroscopic guidance provided for retrograde pyelogram  Images provided demonstrate performance of right retrograde pyelogram, right ureteroscopy, with right ureteral stent placement  Mild hydroureteronephrosis noted  A small amount of extravasated contrast is seen around the right proximal ureter  Osseous  and soft tissue detail limited by technique  Images provided also demonstrate performance of left retrograde pyelogram with left ureteral stent placement  Impression: Fluoroscopic guidance provided for bilateral retrograde pyelogram  Please see procedure report for further details   Workstation performed: [id number]     CT abdomen pelvis with contrast    Result Date: 2/11/2023  Narrative: CT ABDOMEN AND PELVIS WITH IV CONTRAST INDICATION:   Bleeding from colostomy bag, investigating for fistula, complication related to known colon cancer    COMPARISON:  1/26/2023  TECHNIQUE:  CT examination of the abdomen and pelvis was performed  Axial, sagittal, and coronal 2D reformatted images were created from the source data and submitted for interpretation  Radiation dose length product (DLP) for this visit:  260 mGy-cm   This examination, like all CT scans performed in the [facility], was performed utilizing techniques to minimize radiation dose exposure, including the use of iterative reconstruction and automated exposure control  IV Contrast:  100 mL of iohexol (OMNIPAQUE) Enteric Contrast:  Enteric contrast was not administered  FINDINGS: ABDOMEN LOWER CHEST:  Atelectatic changes are noted at the lung bases  LIVER/BILIARY TREE:  Unremarkable  GALLBLADDER:  No calcified gallstones  No pericholecystic inflammatory change  SPLEEN:  Unremarkable  PANCREAS:  Unremarkable  ADRENAL GLANDS:  Stable 1 cm nodule in the lateral limb of the right adrenal gland  KIDNEYS/URETERS:  Bilateral nephroureteral stents similar to prior study  Mild right-sided hydronephrosis is seen  Nonobstructing bilateral punctate intrarenal calculi  STOMACH AND BOWEL:  Left-sided colostomy is seen  Diffuse thickening of the ascending colon and cecum is seen  APPENDIX:  No findings to suggest appendicitis  ABDOMINOPELVIC CAVITY: Free fluid in the abdomen and pelvis is seen  VESSELS:  Unremarkable for patient's age  PELVIS REPRODUCTIVE ORGANS:  Unremarkable for patient's age  URINARY BLADDER:  TIPS of the nephroureteral stents are seen in the urinary bladder    ABDOMINAL WALL/INGUINAL REGIONS:  Fat-containing right inguinal hernia  Small fat-containing umbilical wall hernia    OSSEOUS STRUCTURES:  No acute fracture or osseous destructive lesion identified    Degenerative changes of the spine  Transitional lumbosacral vertebra attributed to sacralized L5 vertebral body  Impression: Interval decrease in right-sided hydronephrosis  Thickening of the ascending colon and cecum which may represent colitis  Follow-up with gastroenterology  Stable 1 cm right adrenal gland nodule  Although its imaging features are indeterminate, it does not have suspicious imaging features (heterogeneity, necrosis, irregular margins), therefore this is likely benign, and can be followed by non-contrast abdomen CT or MRI in 12 months  If patient has history of adrenal hyperfunction, consider biochemical evaluation  The study was marked in [facility] for immediate notification  Workstation performed: [id number]     CT abdomen pelvis with contrast    Result Date: 1/26/2023  Narrative: CT ABDOMEN AND PELVIS WITH IV CONTRAST INDICATION:   Abdominal pain, acute, nonlocalized left sided abdominal/flank pain  COMPARISON:  CT abdomen and pelvis 1/6/2023 TECHNIQUE:  CT examination of the abdomen and pelvis was performed  In addition to portal venous phase postcontrast scanning through the abdomen and pelvis, delayed phase postcontrast scanning was performed through the upper abdominal viscera  Axial, sagittal, and coronal 2D reformatted images were created from the source data and submitted for interpretation  Radiation dose length product (DLP) for this visit:  638 mGy-cm   This examination, like all CT scans performed in the [facility], was performed utilizing techniques to minimize radiation dose exposure, including the use of iterative reconstruction and automated exposure control  IV Contrast:  100 mL of iohexol (OMNIPAQUE)  350 Enteric Contrast:  Enteric contrast was not administered  FINDINGS: ABDOMEN LOWER CHEST:  Minimal left lower lobe juxtapleural platelike scarring  Tiny sliding hiatal hernia  Stable 6 mm inferior left breast superficial nodule image 22 series 301   LIVER/BILIARY TREE: Liver is diffusely decreased in density consistent with fatty change  No CT evidence of suspicious hepatic mass  Normal hepatic contours  No biliary dilatation  GALLBLADDER:  No calcified gallstones  Trace gallbladder wall thickening again visualized  SPLEEN:  Unremarkable  PANCREAS:  Unremarkable  ADRENAL GLANDS:  Stable 1 cm nodule in the lateral limb of the right adrenal gland  Unremarkable left adrenal gland  KIDNEYS/URETERS:  Bilateral nephroureteral stents in place with proximal right pigtail in the renal pelvis and proximal left pigtail in the upper pole calyx  Moderate right hydronephrosis similar to prior study  Minimal right proximal ureteral calcification beginning at the UPJ extending over a length of 2 5 cm; decreased calcification since the prior study  New right renal lower pole calculi, 1 3 cm and smaller  Minimal right renal pelvic urothelial thickening and hyperemia has increased with new trace parapelvic stranding  No perinephric collection  Trace residual albeit markedly improved left hydronephrosis  Minimal fat stranding adjacent to the left renal pelvis and proximal ureter with decreased urothelial hyperemia  Trace new renal lower pole calyceal hyperemia likely sequela of recent procedure  Stable calcifications in the proximal left ureter, 3 mm and smaller prior punctate calcification in the distal ureter is no longer present  Left renal calculi, 4 mm and smaller  No perinephric collection  STOMACH AND BOWEL:  Left ventral mid abdominal colostomy  Nondistended small bowel and colon limits the evaluation  No bowel obstruction  APPENDIX:  No findings to suggest appendicitis  ABDOMINOPELVIC CAVITY:  No ascites  No pneumoperitoneum  No lymphadenopathy  VESSELS:  Aortoiliac calcification  No aneurysm  PELVIS REPRODUCTIVE ORGANS:  Unremarkable for patient's age  URINARY BLADDER:  Distal pigtails of bilateral nephroureteral stents in the bladder lumen   Trace gas in the bladder lumen attributed to recent procedure  ABDOMINAL WALL/INGUINAL REGIONS:  Small fat-containing umbilical hernia  Tiny fat-containing right inguinal hernia OSSEOUS STRUCTURES:  No acute fracture or osseous destructive lesion identified  Degenerative changes of the spine  Transitional lumbosacral vertebra attributed to sacralized L5 vertebral body  Impression: Moderate right hydronephrosis with right nephroureteral stent in place similar to prior study  Proximal right ureteral calcification has decreased with new right renal lower pole stone fragments, 1 3 cm and smaller attributed to post lithotripsy retrograde migration  Trace residual albeit markedly improved left hydronephrosis with left nephroureteral stent in place  Stable proximal left ureteral calculi, 3 mm and smaller  Punctate distal left ureteral calculus present previously is no longer visualized  Stable left renal calculi, 4 mm and smaller  Mildly progressive right renal pyelitis in the left renal lower pole calyceal hyperemia may be residual inflammation from recent procedure  Correlate with UA to exclude UTI  No perinephric collection  Stable minimal gallbladder wall thickening without calcified gallstones  This may be sequela of chronic hepatocellular disease, partially contracted gallbladder or nonspecific chronic inflammation  This could be followed up with nonemergent gallbladder ultrasound  Stable 1 cm right adrenal nodule  Although its imaging features are indeterminate, it does not have suspicious imaging features (heterogeneity, necrosis, irregular margins), therefore this is likely benign, and can be followed by non-contrast abdomen CT or MRI in 12 months  If patient has history of adrenal hyperfunction, consider biochemical evaluation   Adrenal recommendation based on institutional consensus and [employer] of [employer] of Radiology 2017;14:8500-0178 This study demonstrates a significant  finding and was documented as such in Epic for liaison and referring practitioner notification  This study demonstrates a significant  finding and was documented as such in Epic for liaison and referring practitioner notification   Workstation performed: [id number]

## 2023-02-24 ENCOUNTER — DOCUMENTATION (OUTPATIENT)
Dept: HEMATOLOGY ONCOLOGY | Facility: CLINIC | Age: 58
End: 2023-02-24

## 2023-02-24 ENCOUNTER — RADIATION ONCOLOGY CONSULT (OUTPATIENT)
Dept: RADIATION ONCOLOGY | Facility: HOSPITAL | Age: 58
End: 2023-02-24

## 2023-02-24 ENCOUNTER — PATIENT OUTREACH (OUTPATIENT)
Dept: HEMATOLOGY ONCOLOGY | Facility: CLINIC | Age: 58
End: 2023-02-24

## 2023-02-24 ENCOUNTER — CLINICAL SUPPORT (OUTPATIENT)
Dept: RADIATION ONCOLOGY | Facility: HOSPITAL | Age: 58
End: 2023-02-24
Attending: STUDENT IN AN ORGANIZED HEALTH CARE EDUCATION/TRAINING PROGRAM

## 2023-02-24 VITALS
HEART RATE: 93 BPM | SYSTOLIC BLOOD PRESSURE: 142 MMHG | TEMPERATURE: 98.3 F | OXYGEN SATURATION: 98 % | RESPIRATION RATE: 16 BRPM | BODY MASS INDEX: 25.54 KG/M2 | WEIGHT: 138.8 LBS | HEIGHT: 62 IN | DIASTOLIC BLOOD PRESSURE: 75 MMHG

## 2023-02-24 DIAGNOSIS — C18.7 MALIGNANT NEOPLASM OF SIGMOID COLON (HCC): Primary | ICD-10-CM

## 2023-02-24 DIAGNOSIS — C19 ADENOCARCINOMA OF RECTOSIGMOID JUNCTION (HCC): ICD-10-CM

## 2023-02-24 DIAGNOSIS — C20 RECTAL CANCER (HCC): ICD-10-CM

## 2023-02-24 RX ORDER — UBIDECARENONE 30 MG
1 CAPSULE ORAL DAILY
COMMUNITY

## 2023-02-24 NOTE — PROGRESS NOTES
Sent request for a MRI of Abdomen and Pelvis to [facility] in [place] as per Priscilla Thaoelor, [facility], request  Fax number [phone]

## 2023-02-24 NOTE — PROGRESS NOTES
Called radiology at [facility], pt had MRI abdomen/ pelvis done there    Image request will be sent to have discs sent to us

## 2023-02-24 NOTE — PROGRESS NOTES
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 arising approximately 11-12cm from the AV  She was started on MIRI at [facility] with plan for CapeOx x4 months followed by chemoRT and resection however before completing care there, moved to [place] and is attempting to transfer her care to [place] [id number]  We reviewed the patient's clinical history and available imaging studies  Unfortunately her pretreatment MRI Pelvis was not available at the time of this interview, but the report and her recent CT based imaging is suggestive of a mid to high rectal carcinoma  We reviewed that for a T3N0 rectal cancer, my preferred approach is MIRI with a combination of chemoradiation and chemotherapy prior to planned surgical resection  That said, this patient has had substantial difficulty with oxaliplatin based chemotherapy and it is unclear to me if the benefits of upfront chemo outweigh its risks at this point  She is to meet with Dr Karol Norton who will discuss this with her further  An alternative approach would be to proceed directly with chemoRT followed by resection, which is certainly reasonable for a T3N0 tumor  Ultimately we will need to obtain her pretreatment imaging for review in order to render complete recommendations  Once these are available, I will plan to discuss her case with Dr Karol Norton and Dr [place] to come to a consensus  She could also be discussed at GI tumor board if needed   In the meantime she was instructed to call her medical oncologist in [place] and have them send her additional capecitabine, which she should continue until a plan is formalized here  Summary:   - Will likely need neoadjuvant chemoRT +/- additional CapeOx/FOLFOX chemotherapy (pending evaluation with medical oncology) followed by resection  - Will need to obtain all imaging from prior to treatment  - Plan to discuss with other medical providers  History of Present Illness   Ms Xiomy Rodriguez is a 56year old woman with a history of recurrent nephrolithiasis who was initially found to have a rectal mass after PET/CT (5/24/22) performed for the work-up of pulmonary nodules demonstrated focal activity involving the lower sigmoid colon with a short segment of circumferential wall thickening for which colonoscopy was recommended  PET/CT additionally did note innumerable micronodules scattered throughout both lungs with FDG avidity which were ultimately found to be secondary to sarcoidosis following left VATS and left pleural biopsy  Her rectal mass was not followed up until she underwent colonoscopy (10/28/22) after being readmitted with urosepsis secondary to obstructing nephrolithiasis  Colonscopy demonstrated a circumferential fungating polypoid partially obstructing mass at 12-17cm from the AV; biopsy demonstrated adenocarcinoma, MMR intact  Pelvic MRI demonstrated a 5 3cm rectal mass involving the mid to upper rectum with invasion into the mesorectal fat and no adenopathy (cT3N0)  She did undergo diversion colostomy around this time  At some point she was also thought to have a possible liver metastasis, but this was found to only be a hylanized subcapsular nodule without any malignancy  The patient was seen by Dr Aleah Santoro (medical oncology, [facility]) and Dr Alice Seaman (radiation oncology, [facility]) with plan for MIRI followed by resection   She started on CapeOx and received 3 cycles, sustaining significant difficult with treatment resulting in ED visits  Her last cycle of CapeOx was in 12/2022 and she has since been maintained on Xeloda alone until a few days ago when she ran out of medication  She had planned on transferring care to [place] [address] but has yet to see medical oncology or colorectal surgery  Unfortunately he prior MRI Pelvis is not available for my review at present  Currently she is doing fair overall  She had significant difficulty with CapeOx previously and was apparently being considered for inpatient desensitization protocol prior to losing follow up with her outside medical oncologist  She describes that with oxaliplatin infusion she developed extreme difficulty walking, numbness, and the sensation of tongue enlargement  She is scheduled to see Dr Jonathan Oliveira on 3/14/23 and Dr Oseas Berrios on 4/4/23  Historical Information   Oncology History   Malignant neoplasm of sigmoid colon ([facility] [address] )   10/28/2022 Biopsy    Colon, Rectosigmoid Colon Mass Biopsy (1 slide [address] , collected 10/28/2022):  - Adenocarcinoma arising in a tubular adenoma     12/1/2022 Biopsy    DIAGNOSIS LIVER, SEGMENT 3, RESECTION:  - HYALINIZED SUBCAPSULAR AND INTRA-PARENCHYMAL NODULES  NO MALIGNANCY SEEN  SEE NOTE  - MILD MACROVESICULAR STEATOSIS (25%)  NO EVIDENCE OF STEATOHEPATITIS  TRICHOME STAIN SHOWS MILD PORTAL FIBROSIS    - 2+ IRON DEPOSITION WITHIN KUPFFER CELLS (IRON STAIN), CONSISTENT WITH SECONDARY HEMOCHROMATOSIS  NOTE: THIS LIKELY REPRESENTS A MARKEDLY SCLEROTIC HEMANGIOMA OR CHANGES SECONDARY TO INJURY  12/1/2022 Surgery    Laparoscopic diverting colostomy with liver biopsy  Dr Mitesh Maxwell       12/25/2022 Initial Diagnosis    Malignant neoplasm of sigmoid colon ([facility] [address] )     2022 - 1/10/2023 Chemotherapy    Oxaliplatin   [address]           Past Medical History:   Diagnosis Date   • Cancer [facility])    • Colon cancer [facility])    • Fall    • Headache • Hemochromatosis, unspecified    • History of transfusion     2022 - no adverse reaction   • Kidney calculi    • Kidney stone    • Liver disease     hemangioma   • Muscle weakness    • Sarcoidosis    • Seizures ([address] )     2022     Past Surgical History:   Procedure Laterality Date   • ABSCESS DRAINAGE      left breast   • CHEST TUBE INSERTION     • COLON SURGERY      colostomy   • COLONOSCOPY     • FL RETROGRADE PYELOGRAM  1/23/2023   • LUNG BIOPSY     • OH CYSTO/URETERO W/LITHOTRIPSY &INDWELL STENT INSRT Bilateral 1/23/2023    Procedure: CYSTOSCOPY  RIGHT URETEROSCOPY WITH LITHOTRIPSY HOLMIUM LASER, BILATERAL RETROGRADE PYELOGRAM AND BILATERAL  URETERAL STENT INSERTION;  Surgeon: Anurag Cabello MD;  Location: [facility];  Service: Urology   • TONSILLECTOMY     • URINARY SURGERY Bilateral     bilateral stents       Family History   Problem Relation Age of Onset   • Cancer Mother    • Cirrhosis Father        Social History   Social History     Substance and Sexual Activity   Alcohol Use Never     Social History     Substance and Sexual Activity   Drug Use Never     Social History     Tobacco Use   Smoking Status Never   • Passive exposure: Past   Smokeless Tobacco Never         Meds/Allergies     Current Outpatient Medications:   •  acetaminophen (TYLENOL) 325 mg tablet, Take 650 mg by mouth every 6 (six) hours as needed for mild pain, Disp: , Rfl:   •  cyanocobalamin (VITAMIN B-12) 1000 MCG tablet, Take 1,000 mcg by mouth daily, Disp: , Rfl:   •  Potassium Gluconate 550 MG TABS, Take 1 tablet by mouth in the morning, Disp: , Rfl:   •  predniSONE 10 mg tablet, Take 10 mg by mouth daily at bedtime, Disp: , Rfl:   •  predniSONE 20 mg tablet, Take 20 mg by mouth in the morning Takes in am, Disp: , Rfl:   •  tamsulosin (FLOMAX) 0 4 mg, Take 1 capsule (0 4 mg total) by mouth daily at bedtime, Disp: 15 capsule, Rfl: 0  •  vancomycin (VANCOCIN) 125 MG capsule, Take 1 capsule (125 mg total) by mouth 4 (four) times a day for 12 days, THEN 1 capsule (125 mg total) 2 (two) times a day for 7 days, THEN 1 capsule (125 mg total) in the morning for 7 days, THEN 1 capsule (125 mg total) every other day for 7 days, THEN 1 capsule (125 mg total) every 3 (three) days for 14 days   Do not start before February 14, 2023 , Disp: 78 capsule, Rfl: 0  •  amLODIPine (NORVASC) 5 mg tablet, Take 5 mg by mouth daily (Patient not taking: Reported on 2/24/2023), Disp: , Rfl:   •  CAPECITABINE PO, Take 1,650 mg by mouth 2 (two) times a day (Patient not taking: Reported on 2/24/2023), Disp: , Rfl:   •  diclofenac sodium (VOLTAREN) 50 mg EC tablet, Take 1 tablet (50 mg total) by mouth 3 (three) times a day for 7 days, Disp: 21 tablet, Rfl: 0  •  docusate sodium (COLACE) 100 mg capsule, Take 1 capsule (100 mg total) by mouth 2 (two) times a day for 15 days, Disp: 30 capsule, Rfl: 0  •  famotidine (PEPCID) 20 mg tablet, Take 20 mg by mouth 2 (two) times a day, Disp: , Rfl:   •  ferrous sulfate 325 (65 FE) MG EC tablet, Take 1 tablet by mouth 2 (two) times a day, Disp: , Rfl:   •  ondansetron (ZOFRAN) 8 mg tablet, TAKE 1 TABLET EVERY 12 HOURS AS NEEDED FOR CHEMO-INDUCED NAUSEA/VOMITING, Disp: , Rfl:   •  oxyCODONE-acetaminophen (PERCOCET) 5-325 mg per tablet, TAKE 1 TABLET BY MOUTH EVERY 6 HOURS AS NEEDED FOR MODERATE PAIN (SCORE 4-6), Disp: , Rfl:   •  potassium chloride (K-DUR,KLOR-CON) 20 mEq tablet, Take 2 tablets (40 mEq total) by mouth daily for 7 days Do not start before February 15, 2023 , Disp: 14 tablet, Rfl: 0  •  POTASSIUM PO, Take by mouth (Patient not taking: Reported on 2/15/2023), Disp: , Rfl:   •  prochlorperazine (COMPAZINE) 10 mg tablet, TAKE 1 TABLET (10 MG TOTAL) BY MOUTH EVERY 6 (SIX) HOURS AS NEEDED (CHEMO INDUCED NAUSEA/VOMITING), Disp: , Rfl:   Allergies   Allergen Reactions   • Oxaliplatin Shortness Of Breath     Reactions occurred with 2nd and 3rd infusions (about 1-3 hours from initiation of infusion) and required treatment with steroids and antihistamines  Please refer to allergy note on 2/7/2023 for detailed description of her reactions  Review of Systems   Constitutional: Positive for activity change (decreased, limited, tires easily) and fatigue (tires easily)  Negative for appetite change, fever and unexpected weight change  HENT: Negative  Dry throat   Eyes: Negative  Respiratory: Positive for shortness of breath (DAMICO)  Negative for chest tightness and wheezing  Cardiovascular: Negative for chest pain and leg swelling  Gastrointestinal: Positive for abdominal pain (occasional) and rectal pain (intermittent pain/pressure)  Negative for nausea and vomiting  Colostomy - functioning for brown/green stool  No blood in colostomy since recent admission on 2/10/23  Occasional rectal discharge (stool and blood)  2/10/23 Diagnosed with C Diff, currently on oral antibiotic   Genitourinary: Positive for hematuria (pt reports this is due to kidney stones) and urgency  Negative for difficulty urinating and dysuria  No incontinence  Wears urinary pads as precaution  Nocturia 2-3x  Musculoskeletal: Positive for gait problem (can only walk short distances due to fatigue)  Skin: Negative  Allergic/Immunologic: Negative  Negative for environmental allergies and food allergies  Neurological: Positive for weakness (generalized)  Negative for dizziness, light-headedness, numbness and headaches  Hematological: Negative  Psychiatric/Behavioral: Positive for dysphoric mood and sleep disturbance (wakes up due to dry throat)   The patient is not nervous/anxious          OBJECTIVE:   /75 (BP Location: Left arm, Patient Position: Sitting)   Pulse 93   Temp 98 3 °F (36 8 °C) (Temporal)   Resp 16   Ht 5' 2" (1 575 m)   Wt 63 kg (138 lb 12 8 oz)   SpO2 98%   BMI 25 39 kg/m²   Pain Assessment:  0  Performance Status: ECOG/Zubrod/WHO: 1 - Symptomatic but completely ambulatory    Physical Exam   Well appearing  NAD  No increased work of breathing  Ostomy bag in place  No LE edema  Extremities warm and well perfused  BENJAMIN deferred  RESULTS  Lab Results    Chemistry        Component Value Date/Time    K 3 1 (L) 02/14/2023 0511     (H) 02/14/2023 0511    CO2 25 02/14/2023 0511    BUN 13 02/14/2023 0511    CREATININE 0 55 (L) 02/14/2023 0511        Component Value Date/Time    CALCIUM 8 0 (L) 02/14/2023 0511    ALKPHOS 351 (H) 02/11/2023 0435    AST 38 02/11/2023 0435    ALT 64 (H) 02/11/2023 0435            Lab Results   Component Value Date    WBC 6 74 02/13/2023    HGB 10 8 (L) 02/13/2023    HCT 34 5 (L) 02/13/2023     (H) 02/13/2023     (L) 02/13/2023         Imaging Studies  CT abdomen pelvis with contrast    Result Date: 2/11/2023  Narrative: CT ABDOMEN AND PELVIS WITH IV CONTRAST INDICATION:   Bleeding from colostomy bag, investigating for fistula, complication related to known colon cancer    COMPARISON:  1/26/2023  TECHNIQUE:  CT examination of the abdomen and pelvis was performed  Axial, sagittal, and coronal 2D reformatted images were created from the source data and submitted for interpretation  Radiation dose length product (DLP) for this visit:  260 mGy-cm   This examination, like all CT scans performed in the [facility], was performed utilizing techniques to minimize radiation dose exposure, including the use of iterative reconstruction and automated exposure control  IV Contrast:  100 mL of iohexol (OMNIPAQUE) Enteric Contrast:  Enteric contrast was not administered  FINDINGS: ABDOMEN LOWER CHEST:  Atelectatic changes are noted at the lung bases  LIVER/BILIARY TREE:  Unremarkable  GALLBLADDER:  No calcified gallstones  No pericholecystic inflammatory change  SPLEEN:  Unremarkable  PANCREAS:  Unremarkable  ADRENAL GLANDS:  Stable 1 cm nodule in the lateral limb of the right adrenal gland   KIDNEYS/URETERS:  Bilateral nephroureteral stents similar to prior study   Mild right-sided hydronephrosis is seen  Nonobstructing bilateral punctate intrarenal calculi  STOMACH AND BOWEL:  Left-sided colostomy is seen  Diffuse thickening of the ascending colon and cecum is seen  APPENDIX:  No findings to suggest appendicitis  ABDOMINOPELVIC CAVITY: Free fluid in the abdomen and pelvis is seen  VESSELS:  Unremarkable for patient's age  PELVIS REPRODUCTIVE ORGANS:  Unremarkable for patient's age  URINARY BLADDER:  TIPS of the nephroureteral stents are seen in the urinary bladder    ABDOMINAL WALL/INGUINAL REGIONS:  Fat-containing right inguinal hernia  Small fat-containing umbilical wall hernia    OSSEOUS STRUCTURES:  No acute fracture or osseous destructive lesion identified  Degenerative changes of the spine  Transitional lumbosacral vertebra attributed to sacralized L5 vertebral body  Impression: Interval decrease in right-sided hydronephrosis  Thickening of the ascending colon and cecum which may represent colitis  Follow-up with gastroenterology  Stable 1 cm right adrenal gland nodule  Although its imaging features are indeterminate, it does not have suspicious imaging features (heterogeneity, necrosis, irregular margins), therefore this is likely benign, and can be followed by non-contrast abdomen CT or MRI in 12 months  If patient has history of adrenal hyperfunction, consider biochemical evaluation  The study was marked in [facility] for immediate notification  Workstation performed: [id number]     CT abdomen pelvis with contrast    Result Date: 1/26/2023  Narrative: CT ABDOMEN AND PELVIS WITH IV CONTRAST INDICATION:   Abdominal pain, acute, nonlocalized left sided abdominal/flank pain  COMPARISON:  CT abdomen and pelvis 1/6/2023 TECHNIQUE:  CT examination of the abdomen and pelvis was performed  In addition to portal venous phase postcontrast scanning through the abdomen and pelvis, delayed phase postcontrast scanning was performed through the upper abdominal viscera  Axial, sagittal, and coronal 2D reformatted images were created from the source data and submitted for interpretation  Radiation dose length product (DLP) for this visit:  638 mGy-cm   This examination, like all CT scans performed in the [facility], was performed utilizing techniques to minimize radiation dose exposure, including the use of iterative reconstruction and automated exposure control  IV Contrast:  100 mL of iohexol (OMNIPAQUE)  350 Enteric Contrast:  Enteric contrast was not administered  FINDINGS: ABDOMEN LOWER CHEST:  Minimal left lower lobe juxtapleural platelike scarring  Tiny sliding hiatal hernia  Stable 6 mm inferior left breast superficial nodule image 22 series 301  LIVER/BILIARY TREE:  Liver is diffusely decreased in density consistent with fatty change  No CT evidence of suspicious hepatic mass  Normal hepatic contours  No biliary dilatation  GALLBLADDER:  No calcified gallstones  Trace gallbladder wall thickening again visualized  SPLEEN:  Unremarkable  PANCREAS:  Unremarkable  ADRENAL GLANDS:  Stable 1 cm nodule in the lateral limb of the right adrenal gland  Unremarkable left adrenal gland  KIDNEYS/URETERS:  Bilateral nephroureteral stents in place with proximal right pigtail in the renal pelvis and proximal left pigtail in the upper pole calyx  Moderate right hydronephrosis similar to prior study  Minimal right proximal ureteral calcification beginning at the UPJ extending over a length of 2 5 cm; decreased calcification since the prior study  New right renal lower pole calculi, 1 3 cm and smaller  Minimal right renal pelvic urothelial thickening and hyperemia has increased with new trace parapelvic stranding  No perinephric collection  Trace residual albeit markedly improved left hydronephrosis  Minimal fat stranding adjacent to the left renal pelvis and proximal ureter with decreased urothelial hyperemia   Trace new renal lower pole calyceal hyperemia likely sequela of recent procedure  Stable calcifications in the proximal left ureter, 3 mm and smaller prior punctate calcification in the distal ureter is no longer present  Left renal calculi, 4 mm and smaller  No perinephric collection  STOMACH AND BOWEL:  Left ventral mid abdominal colostomy  Nondistended small bowel and colon limits the evaluation  No bowel obstruction  APPENDIX:  No findings to suggest appendicitis  ABDOMINOPELVIC CAVITY:  No ascites  No pneumoperitoneum  No lymphadenopathy  VESSELS:  Aortoiliac calcification  No aneurysm  PELVIS REPRODUCTIVE ORGANS:  Unremarkable for patient's age  URINARY BLADDER:  Distal pigtails of bilateral nephroureteral stents in the bladder lumen  Trace gas in the bladder lumen attributed to recent procedure  ABDOMINAL WALL/INGUINAL REGIONS:  Small fat-containing umbilical hernia  Tiny fat-containing right inguinal hernia OSSEOUS STRUCTURES:  No acute fracture or osseous destructive lesion identified  Degenerative changes of the spine  Transitional lumbosacral vertebra attributed to sacralized L5 vertebral body  Impression: Moderate right hydronephrosis with right nephroureteral stent in place similar to prior study  Proximal right ureteral calcification has decreased with new right renal lower pole stone fragments, 1 3 cm and smaller attributed to post lithotripsy retrograde migration  Trace residual albeit markedly improved left hydronephrosis with left nephroureteral stent in place  Stable proximal left ureteral calculi, 3 mm and smaller  Punctate distal left ureteral calculus present previously is no longer visualized  Stable left renal calculi, 4 mm and smaller  Mildly progressive right renal pyelitis in the left renal lower pole calyceal hyperemia may be residual inflammation from recent procedure  Correlate with UA to exclude UTI  No perinephric collection  Stable minimal gallbladder wall thickening without calcified gallstones   This may be sequela of chronic hepatocellular disease, partially contracted gallbladder or nonspecific chronic inflammation  This could be followed up with nonemergent gallbladder ultrasound  Stable 1 cm right adrenal nodule  Although its imaging features are indeterminate, it does not have suspicious imaging features (heterogeneity, necrosis, irregular margins), therefore this is likely benign, and can be followed by non-contrast abdomen CT or MRI in 12 months  If patient has history of adrenal hyperfunction, consider biochemical evaluation  Adrenal recommendation based on institutional consensus and Journal of [employer] of Radiology 2017;14:[id number] This study demonstrates a significant  finding and was documented as such in [facility] for liaison and referring practitioner notification  This study demonstrates a significant  finding and was documented as such in [facility] for liaison and referring practitioner notification  Workstation performed: [id number]         Pathology:  Final Diagnosis   A  Colon, Rectosigmoid Colon Mass Biopsy (1 slide [address] , collected 10/28/2022):  - Adenocarcinoma arising in a tubular adenoma      Note: Per outside report (slides were not available for review)       RESULTS OF IMMUNOHISTOCHEMICAL ANALYSIS FOR MISMATCH REPAIR PROTEIN LOSS     RESULTS:  Antibody            Clone                 Description                     Results  MLH1                 M1                     Mismatch repair protein  Intact nuclear expression  MSH2                [id number]        Mismatch repair protein  Intact nuclear expression  MSH6                SP93                  Mismatch repair protein  Intact nuclear expression  PMS2                 A16-4                 Mismatch repair protein  Intact nuclear expression       ASSESSMENT  1   Rectal cancer [facility])  Ambulatory Referral to Radiation Oncology        Cancer Staging   No matching staging information was found for the patient  PLAN/DISCUSSION  No orders of the defined types were placed in this encounter  Lona Brooks MD  7/84/1920,3:91 PM    Total time spent reviewing EMR, seeing patient in consultation, documenting visit, placing treatment orders, and communicating with other medical providers: 68 minutes      Portions of the record may have been created with voice recognition software  Occasional wrong word or "sound a like" substitutions may have occurred due to the inherent limitations of voice recognition software  Read the chart carefully and recognize, using context, where substitutions have occurred

## 2023-02-24 NOTE — PROGRESS NOTES
Return call from the pt this afternoon, spoke to the pt's ex , he lives upstairs in the home and pt lives downstairs  He is helping w her cancer care  Spoke to him w the approval of the pt verablly as well as confirming communication consent, I explained my role, introduced myself, rvw'd w them the upcoming appts she has sched to meet w the CRS and med/onc, d/w him their roles in her tx, he asked if he needed the CRS appt moved up sooner, I explained that at this time the pt will not be needing sx and that at that point it would be enough time to meet w him to discuss future surgical plans  Pt's ex- stated pt is a stage II from what he last knew, I explained how we typically tx our rectal CA pts and that she would need chemo/RT next from what it sounds like  Pt has taken oral Xeloda since she has an allergy to the Oxaliplatin  The pt has moved to [address] [place] [address] from [place], signed a lease in [address] [place] [address] two days before she found out about her dx and pt and ex- had been paying for rent in both [place] and [place] while pt stayed in [place] to get tx there, now they are moved here in [address] [place] [address]  Pt has hx of hemochromatosis of the liver and sarcoidosis in the lung according to ex-  We completed the general assessment, referrals placed for onc SW and onc genetics, explained how both play a role in the pt's health care  Pt is on oral Xeloda at this time, no need for port  Provided him with chey and Roseanne's contact info and explained that Therese La would be making outreach after she meets w the medical oncologist and we would f/u as she is getting treated  He thanked me for my call and assistance

## 2023-02-24 NOTE — PROGRESS NOTES
Julianna Logan 1965 is a 56 y o  female with T3NO rectal cancer  She has a history of CKD stage 3, kidney stones, bilateral ureteral stents, chronic anemia, and pulmonary sarcoidosis  In May of 2022 patient had a CT and PET scan which revealed a possible colon mass  A colonoscopy was recommended but not completed  In October of 2022 she presented to the ED with hypertensive crisis and sepsis secondary to hydronephrosis/calculous obstruction  During that admission scans again revealed the suspected colon mass  Colonoscopy on 10/28/2022 revealed malignant partially obstructing tumor in the recto sigmoid colon  She declined surgery at this time and began chemo with [facility]  In November she presented to the ED with worsening diarrhea  Imaging indicated annular constricting malignancy in the rectosigmoid junction with luminal narrowing and upstream constipation with stercoral colitis  She elected then to have surgery, and a laparoscopic diverting colostomy with liver wedge biopsy was completed on 12/1/2022  During the procedure, liver was noted to be grossly abnormal and the wedge biopsy was performed  Chemo was stopped in January due to an allergic reaction  She had been receiving care at [facility] and recently moved here from [place]  She is in the process of establishing care here  10/25/2022: Notes from [facility] hospital admission: CTA cap reveals "Annular enhancing soft tissue mass involving the distal sigmoid colon concerning for a primary colonic neoplasm (FDG avid on prior PET/CT from May 24, 2022)  There is prominent stool retention noted throughout the colon upstream from the suspected sigmoid mass and underlying colonic obstruction cannot be excluded "  "MRI AP: 5 3 cm rectal mass involving the mid to upper rectum as described above  The mass abuts the posterior wall of the cervix with questionable early involvement of the cervical serosa as described above    MRI Pelvis: IMPRESSION:  Stage: T [3 c] N[0]  CRM: Clear  Sphincter involvement: No  Suspicious extra mesorectal lymph nodes: No      10/28/2022 Colonoscopy:   - Malignant partially obstructing tumor in the recto-sigmoid colon  Biopsied  Tattooed  - One 4 mm polyp in the descending colon  - One 10 mm polyp in the cecum   - The distal rectum and anal verge are normal on retroflexion view  MRI AP: 5 3 cm rectal mass involving the mid to upper rectum as described above  The mass abuts the posterior wall of the cervix with questionable early involvement of the cervical serosa as described above  10/28/2022 Colon, Rectosigmoid Colon Mass Biopsy (1 slide [address] , collected 10/28/2022): Invasive moderately differentiated colonic adenocarcinoma  11/28/2022 Admission for abdominal pain and diarrhea  CT scan with annular constricting malignancy in the rectosigmoid junction with luminal narrowing and upstream constipation with stercoral colitis  12/1/2022 Laparoscopic diverting colostomy with liver biopsy  Dr Gaudencio Fernandez  Liver path below  12/25/2022 Admission for C  Diff colitis  12/25/2022 CT ap: Left mid abdominal colostomy with colitis with bowel involvement extending from the mid transverse colon to the level of the colostomy  The colon in the region of the colostomy is most severely involved  No pneumatosis or free air  Moderate right and mild left hydronephrosis with bilateral ureteral stents in place  The proximal 2 cm of the right stent is encrusted  Several calcifications along the left stent may also represent focal sites of encrustation or calculi in the ureter  Nonobstructing calculi in the left kidney  Marked hepatic steatosis  1/6/2023 Admission for acute cystitis  Encrusted bilateral uretal stents      1/6/2023 CT AP: Progressive moderate right hydronephrosis   No perinephric collection   Stable encrustation of the proximal right nephroureteral stent as described above  Progressive mild left hydronephrosis and proximal hydroureter  No perinephric collection  Few proximal left ureteral calcifications, 3 mm and smaller have increased in number  At least a few of these calcifications are ureteral calculi given slightly intervally decreased stone burden in the left renal lower pole  Stable punctate calculus or encrustation adjacent to the distal stent  Trace new bilateral renal pyelitis and proximal left ureteritis may be sequela of progressive obstruction  Advise correlation with UA to exclude superimposed UTI  Left nephrolithiasis, 4 mm and smaller  Prior colitis proximal to the colostomy has resolved  Minimal gallbladder wall thickening without calcified gallstones  This may be sequela of chronic hepatocellular disease, partially contracted gallbladder or nonspecific chronic inflammation  This could be followed up with nonemergent gallbladder ultrasound  Stable 1 cm right adrenal nodule  Although its imaging features are indeterminate, it does not have suspicious imaging features (heterogeneity, necrosis, irregular margins), therefore this is likely benign, and can be followed by non-contrast abdomen CT or MRI in 12 months  If patient has history of adrenal hyperfunction, consider biochemical evaluation      1/10/2023 ED [facility]: Allergic reaction to Oxaliplatin  (Referred by her HemOnc after reaction to chemo)      1/23/2023 CYSTOSCOPY  RIGHT URETEROSCOPY WITH LITHOTRIPSY HOLMIUM LASER, BILATERAL RETROGRADE PYELOGRAM AND BILATERAL  URETERAL STENT INSERTION       1/26/2023 Admission for R flank pain and bilateral hydronephrosis  1/26/2023 CT AP: Moderate right hydronephrosis with right nephroureteral stent in place similar to prior study  Proximal right ureteral calcification has decreased with new right renal lower pole stone fragments, 1 3 cm and smaller attributed to post lithotripsy retrograde migration  Trace residual albeit markedly improved left hydronephrosis with left nephroureteral stent in place  Stable proximal left ureteral calculi, 3 mm and smaller  Punctate distal left ureteral calculus present previously is no longer visualized  Stable left renal calculi, 4 mm and smaller  Mildly progressive right renal pyelitis in the left renal lower pole calyceal hyperemia may be residual inflammation from recent procedure  Correlate with UA to exclude UTI  No perinephric collection  Stable minimal gallbladder wall thickening without calcified gallstones  This may be sequela of chronic hepatocellular disease, partially contracted gallbladder or nonspecific chronic inflammation  This could be followed up with nonemergent gallbladder ultrasound  Stable 1 cm right adrenal nodule  Although its imaging features are indeterminate, it does not have suspicious imaging features (heterogeneity, necrosis, irregular margins), therefore this is likely benign, and can be followed by non-contrast abdomen CT or MRI in 12 months  If patient has history of adrenal hyperfunction, consider biochemical evaluation  2/10/2023 Admission for blood in colostomy bag, C  Diff recurrence and colitis  Chronic hypokalemia  2/11/2023 CT AP: Interval decrease in right-sided hydronephrosis  Thickening of the ascending colon and cecum which may represent colitis  Follow-up with gastroenterology  Stable 1 cm right adrenal gland nodule  Although its imaging features are indeterminate, it does not have suspicious imaging features (heterogeneity, necrosis, irregular margins), therefore this is likely benign, and can be followed by non-contrast abdomen CT or MRI in 12 months  If patient has history of adrenal hyperfunction, consider biochemical evaluation  2/15/2023 [facility]: Referral to Pulmonology and Urology  Needs to establish care with HemOnc to continue Chemo   Referral to GI        2/21/2023 GI Consult, RUBEN Dow: Vanco taper for C  Diff colitis  Referal to colorectal due to ostomy issues, however it appeared healthy at this visit  Repeat liver enzymes, refer to liver specialist  Patient reports she plans to continue chemo and undergo radiation  Upcoming:  3/14/2023 HemOnc, Dr Keegan Kearney  3/14/2023 Urology  4/4/2023 Colorectal, Dr Caralee Gaucher  5/4/2023 GI  4/3/2023  CYSTOSCOPY URETEROSCOPY WITH LITHOTRIPSY HOLMIUM LASER, RETROGRADE PYELOGRAM AND INSERTION STENT URETERAL (Left: Bladder)      Oncology History   Malignant neoplasm of sigmoid colon ([facility] [address] )   10/28/2022 Biopsy    Colon, Rectosigmoid Colon Mass Biopsy (1 slide [address] , collected 10/28/2022):  - Adenocarcinoma arising in a tubular adenoma     12/1/2022 Biopsy    DIAGNOSIS LIVER, SEGMENT 3, RESECTION:  - HYALINIZED SUBCAPSULAR AND INTRA-PARENCHYMAL NODULES  NO MALIGNANCY SEEN  SEE NOTE  - MILD MACROVESICULAR STEATOSIS (25%)  NO EVIDENCE OF STEATOHEPATITIS  TRICHOME STAIN SHOWS MILD PORTAL FIBROSIS    - 2+ IRON DEPOSITION WITHIN KUPFFER CELLS (IRON STAIN), CONSISTENT WITH SECONDARY HEMOCHROMATOSIS  NOTE: THIS LIKELY REPRESENTS A MARKEDLY SCLEROTIC HEMANGIOMA OR CHANGES SECONDARY TO INJURY  12/1/2022 Surgery    Laparoscopic diverting colostomy with liver biopsy  Dr Priya Shah       12/25/2022 Initial Diagnosis    Malignant neoplasm of sigmoid colon ([facility] [address] )     2022 - 1/10/2023 Chemotherapy    Oxaliplatin  [address]         Review of Systems:  Review of Systems   Constitutional: Positive for activity change (decreased, limited, tires easily) and fatigue (tires easily)  Negative for appetite change, fever and unexpected weight change  HENT: Negative  Dry throat   Eyes: Negative  Respiratory: Positive for shortness of breath (DAMICO)  Negative for chest tightness and wheezing  Cardiovascular: Negative for chest pain and leg swelling     Gastrointestinal: Positive for abdominal pain (occasional) and rectal pain (intermittent pain/pressure)  Negative for nausea and vomiting  Colostomy - functioning for brown/green stool  No blood in colostomy since recent admission on 2/10/23  Occasional rectal discharge (stool and blood)  2/10/23 Diagnosed with C Diff, currently on oral antibiotic   Genitourinary: Positive for hematuria (pt reports this is due to kidney stones) and urgency  Negative for difficulty urinating and dysuria  No incontinence  Wears urinary pads as precaution  Nocturia 2-3x  Musculoskeletal: Positive for gait problem (can only walk short distances due to fatigue)  Skin: Negative  Allergic/Immunologic: Negative  Negative for environmental allergies and food allergies  Neurological: Positive for weakness (generalized)  Negative for dizziness, light-headedness, numbness and headaches  Hematological: Negative  Psychiatric/Behavioral: Positive for dysphoric mood and sleep disturbance (wakes up due to dry throat)  The patient is not nervous/anxious  Clinical Trial: no    OB/GYN History:  No LMP recorded   Patient is postmenopausal     Pregnancy test needed:  no    ONCOTYPE/MAMMOPRINT results: n/a    PFT: n/a    Prior Radiation: No    Teaching: NCI RT packet     MST: completed    Implantable Devices (Port, Pacemaker, pain stimulator): No    Hip Replacement: no      [unfilled]  Health Maintenance   Topic Date Due   • Hepatitis C Screening  Never done   • COVID-19 Vaccine (1) Never done   • Depression Screening  Never done   • HIV Screening  Never done   • BMI: Followup Plan  Never done   • Annual Physical  Never done   • DTaP,Tdap,and Td Vaccines (1 - Tdap) Never done   • Cervical Cancer Screening  Never done   • Breast Cancer Screening: Mammogram  Never done   • Influenza Vaccine (1) Never done   • BMI: Adult  02/21/2024   • Pneumococcal Vaccine: Pediatrics (0 to 5 Years) and At-Risk Patients (6 to 59 Years)  Aged Out   • HIB Vaccine  Aged [place] • IPV Vaccine  Aged Out   • Hepatitis A Vaccine  Aged Out   • Meningococcal ACWY Vaccine  Aged Out   • HPV Vaccine  Aged Out     Past Medical History:   Diagnosis Date   • Cancer ([facility] [address] )    • Colon cancer ([facility] [address] )    • Fall    • Headache    • Hemochromatosis, unspecified    • History of transfusion     2022 - no adverse reaction   • Kidney calculi    • Kidney stone    • Liver disease     hemangioma   • Muscle weakness    • Sarcoidosis    • Seizures ([facility] [address] )     2022     Past Surgical History:   Procedure Laterality Date   • ABSCESS DRAINAGE      left breast   • CHEST TUBE INSERTION     • COLON SURGERY      colostomy   • COLONOSCOPY     • FL RETROGRADE PYELOGRAM  1/23/2023   • LUNG BIOPSY     • HI CYSTO/URETERO W/LITHOTRIPSY &INDWELL STENT INSRT Bilateral 1/23/2023    Procedure: CYSTOSCOPY  RIGHT URETEROSCOPY WITH LITHOTRIPSY HOLMIUM LASER, BILATERAL RETROGRADE PYELOGRAM AND BILATERAL  URETERAL STENT INSERTION;  Surgeon: Fifi Jacob MD;  Location: [facility];  Service: Urology   • TONSILLECTOMY     • URINARY SURGERY Bilateral     bilateral stents     No family history on file    Social History     Tobacco Use   • Smoking status: Never     Passive exposure: Past   • Smokeless tobacco: Never   Vaping Use   • Vaping Use: Never used   Substance Use Topics   • Alcohol use: Never   • Drug use: Never        Current Outpatient Medications:   •  acetaminophen (TYLENOL) 325 mg tablet, Take 650 mg by mouth every 6 (six) hours as needed for mild pain, Disp: , Rfl:   •  amLODIPine (NORVASC) 5 mg tablet, Take 5 mg by mouth daily, Disp: , Rfl:   •  CAPECITABINE PO, Take 1,650 mg by mouth 2 (two) times a day, Disp: , Rfl:   •  cyanocobalamin (VITAMIN B-12) 1000 MCG tablet, Take 1,000 mcg by mouth daily, Disp: , Rfl:   •  diclofenac sodium (VOLTAREN) 50 mg EC tablet, Take 1 tablet (50 mg total) by mouth 3 (three) times a day for 7 days, Disp: 21 tablet, Rfl: 0  •  docusate sodium (COLACE) 100 mg capsule, Take 1 capsule (100 mg total) by mouth 2 (two) times a day for 15 days, Disp: 30 capsule, Rfl: 0  •  famotidine (PEPCID) 20 mg tablet, Take 20 mg by mouth 2 (two) times a day, Disp: , Rfl:   •  ferrous sulfate 325 (65 FE) MG EC tablet, Take 1 tablet by mouth 2 (two) times a day, Disp: , Rfl:   •  ondansetron (ZOFRAN) 8 mg tablet, TAKE 1 TABLET EVERY 12 HOURS AS NEEDED FOR CHEMO-INDUCED NAUSEA/VOMITING, Disp: , Rfl:   •  oxyCODONE-acetaminophen (PERCOCET) 5-325 mg per tablet, TAKE 1 TABLET BY MOUTH EVERY 6 HOURS AS NEEDED FOR MODERATE PAIN (SCORE 4-6), Disp: , Rfl:   •  potassium chloride (K-DUR,KLOR-CON) 20 mEq tablet, Take 2 tablets (40 mEq total) by mouth daily for 7 days Do not start before February 15, 2023 , Disp: 14 tablet, Rfl: 0  •  POTASSIUM PO, Take by mouth (Patient not taking: Reported on 2/15/2023), Disp: , Rfl:   •  predniSONE 10 mg tablet, Take 10 mg by mouth daily at bedtime, Disp: , Rfl:   •  predniSONE 20 mg tablet, Take 20 mg by mouth in the morning Takes in am, Disp: , Rfl:   •  prochlorperazine (COMPAZINE) 10 mg tablet, TAKE 1 TABLET (10 MG TOTAL) BY MOUTH EVERY 6 (SIX) HOURS AS NEEDED (CHEMO INDUCED NAUSEA/VOMITING), Disp: , Rfl:   •  tamsulosin (FLOMAX) 0 4 mg, Take 1 capsule (0 4 mg total) by mouth daily at bedtime (Patient not taking: Reported on 2/15/2023), Disp: 15 capsule, Rfl: 0  •  vancomycin (VANCOCIN) 125 MG capsule, Take 1 capsule (125 mg total) by mouth 4 (four) times a day for 12 days, THEN 1 capsule (125 mg total) 2 (two) times a day for 7 days, THEN 1 capsule (125 mg total) in the morning for 7 days, THEN 1 capsule (125 mg total) every other day for 7 days, THEN 1 capsule (125 mg total) every 3 (three) days for 14 days  Do not start before February 14, 2023 , Disp: 78 capsule, Rfl: 0  Allergies   Allergen Reactions   • Oxaliplatin Shortness Of Breath     Reactions occurred with 2nd and 3rd infusions (about 1-3 hours from initiation of infusion) and required treatment with steroids and antihistamines  Please refer to allergy note on 2/7/2023 for detailed description of her reactions  There were no vitals filed for this visit

## 2023-02-28 ENCOUNTER — TELEPHONE (OUTPATIENT)
Dept: GENETICS | Facility: CLINIC | Age: 58
End: 2023-02-28

## 2023-02-28 ENCOUNTER — DOCUMENTATION (OUTPATIENT)
Dept: HEMATOLOGY ONCOLOGY | Facility: CLINIC | Age: 58
End: 2023-02-28

## 2023-02-28 NOTE — PROGRESS NOTES
Left message with [facility] radiology to check status of requesting imaging   Left my contact information for a call back

## 2023-02-28 NOTE — TELEPHONE ENCOUNTER
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

## 2023-03-01 ENCOUNTER — PATIENT OUTREACH (OUTPATIENT)
Dept: HEMATOLOGY ONCOLOGY | Facility: CLINIC | Age: 58
End: 2023-03-01

## 2023-03-01 NOTE — PROGRESS NOTES
Spoke with Fonnie Ahumada at [facility] radiology dept    He stated the MRI disc that was requested was sent out yesterday 2/28/2023, and we should receive them in the next day or two via mail

## 2023-03-02 ENCOUNTER — DOCUMENTATION (OUTPATIENT)
Dept: HEMATOLOGY ONCOLOGY | Facility: CLINIC | Age: 58
End: 2023-03-02

## 2023-03-02 ENCOUNTER — PATIENT OUTREACH (OUTPATIENT)
Dept: CASE MANAGEMENT | Facility: HOSPITAL | Age: 58
End: 2023-03-02

## 2023-03-02 NOTE — PROGRESS NOTES
Records received from outside facility  Outside Pathology/Images records received from : 2 imaging disks received from [facility] sent to [facility] Radiology reading room   Note routed to team  Yes Dr Nidhi Dos Santos team

## 2023-03-07 ENCOUNTER — CONSULT (OUTPATIENT)
Dept: PULMONOLOGY | Facility: CLINIC | Age: 58
End: 2023-03-07

## 2023-03-07 VITALS
SYSTOLIC BLOOD PRESSURE: 132 MMHG | TEMPERATURE: 98.7 F | RESPIRATION RATE: 16 BRPM | HEART RATE: 94 BPM | BODY MASS INDEX: 26.11 KG/M2 | HEIGHT: 62 IN | WEIGHT: 141.9 LBS | DIASTOLIC BLOOD PRESSURE: 86 MMHG | OXYGEN SATURATION: 97 %

## 2023-03-07 DIAGNOSIS — C18.7 MALIGNANT NEOPLASM OF SIGMOID COLON (HCC): ICD-10-CM

## 2023-03-07 DIAGNOSIS — D86.0 PULMONARY SARCOIDOSIS (HCC): ICD-10-CM

## 2023-03-07 DIAGNOSIS — R93.89 ABNORMAL CT OF THE CHEST: ICD-10-CM

## 2023-03-07 DIAGNOSIS — D86.9 SARCOIDOSIS: Primary | ICD-10-CM

## 2023-03-07 DIAGNOSIS — N20.0 KIDNEY CALCULI: ICD-10-CM

## 2023-03-07 DIAGNOSIS — E83.118 OTHER HEMOCHROMATOSIS: ICD-10-CM

## 2023-03-07 DIAGNOSIS — N13.30 BILATERAL HYDRONEPHROSIS: ICD-10-CM

## 2023-03-07 RX ORDER — PREDNISONE 10 MG/1
20 TABLET ORAL DAILY
Qty: 120 TABLET | Refills: 0 | Status: SHIPPED | OUTPATIENT
Start: 2023-03-07 | End: 2023-05-06

## 2023-03-07 NOTE — LETTER
March 7, 2023     [place]  [address] [address]  [address] [address]    Patient: Janett Argueta   YOB: 1965   Date of Visit: 3/7/2023       Dear Dr Severa Quale: Thank you for referring Janett Argueta to me for evaluation  Below are my notes for this consultation  If you have questions, please do not hesitate to call me  I look forward to following your patient along with you  Sincerely,        Jordin Clark DO        CC: MD Ree Burns, [facility] MD Camilo Orr MD Alejandra Sprung, MD Ciro Gearing, DO  3/7/2023  4:59 PM  Sign when Signing Visit  Pulmonary Consultation   Janett Argueta 56 y o  female MRN: [id number]  3/7/2023      Reason for Consultation:  Sarcoid evaluation    Requested by: Dr Maryanne Castrejon:  1  Sarcoidosis   · Miliary pattern on CT, hypercalcemia, elevated ACE level and NC granulomas on pleural biopsy as listed below  · Clinically improved on prednisone - course complicated by numerous other medical problems and active colon cancer  · Spirometry is normal today  · Will begin plan for steroid taper - plan for prednisone 20mg daily  · Will not initiate bactrim therapy at this point given plans for further taper over the next 4-8 weeks  · Will need to monitor for worsened hypercalcemia  · Needs opthalmology exam and consult placed to see Dr Tramaine Meneses  · Obtain complete PFTs  · Obtain baseline CXR now, will hold on further CT imaging pending oncology evaluation  · Obtain records and CT images from [address]  · Follow up in 6-8 weeks    2  Colon Cancer post colostomy  · Awaiting oncology evaluation  · Will hold on further sarcoid therapies pending chemotherapy plans  · Do not suspect this is a systemic sarcoid reaction to her malignancy given elevated ACE level     3   Liver disease - possible  hemachromatosis   · No evidence of granulomas on biopsy  · Further evaluation and management per gastroenterology/hepatology  · Would not utilize methotrexate should she have prednisone relapse of her sarcoid    4  Recurrent nephrolithiasis with ureteral obstruction in setting of hypercalcemia  · Further care per urology  · Need to trend serial Calcium levels       Plan:    Diagnoses and all orders for this visit:    Sarcoidosis  -     POCT spirometry  -     Ambulatory Referral to Ophthalmology; Future  -     predniSONE 10 mg tablet; Take 2 tablets (20 mg total) by mouth daily    Pulmonary sarcoidosis ([facility] [address] )  -     Ambulatory Referral to Pulmonology  -     Complete PFT with post bronchodilator; Future  -     XR chest pa & lateral; Future  -     Angiotensin converting enzyme; Future    Abnormal CT of the chest    Malignant neoplasm of sigmoid colon (HCC)    R flank pain with bilateral hydronephrosis    Kidney calculi    Other hemochromatosis        History of Present Illness   HPI:  Caryle Romney is a 56 y o  female who presents for sarcoid evaluation  She has a complicated history and has primarily been treated at [facility]  She states she had 1-2 years of worsened dyspnea and chest pain  She had developed kidney stones with hypercalcemia (14 8 May 2022) and had CT revealing miliary pattern on her lung images  She was eventually seen by pulmonary (Dr Vidya Shaver) and referred for surgical pleural biopsy for miliary pattern and pleural thickening on CT Chest and concern for MTB vs sarcoid  Biopsy revealed NC granulomas and she was initiated on prednisone 40mg daily around July 2022  During this evaluation she had CT/PET which revealed lower sigmoid FDG avid lesion  She was diagnosed with colon cancer had diverting colostomy and started on chemotherapy and XRT  She had abnormal LFTs and underwent liver biopsy revealing concerns for hemochromatosis  She has moved to [place] which has delayed some further cancer therapies and she has stopped Capecitabine    She has had numerous admissions recently to include recurrent hydronephrosis post ureteral stent placement, Cdiff colitis, and colostomy bleeding  She presents today for sarcoid evaluation  She reports she had significant chest pain and dyspnea prior to starting prednisone  She has remained on 30mg prednisone for many months but feels improved  Has noted weight gain with prednisone but had prior weight loss and poor appetite  She has not been on TMP/Sulfa prophylaxis  She denied cough or sputum production, no hemoptysis, no pleurisy  She has resolved arthralgias and myalgias  She denied EN type rash  She has not had an eye exam  She denied palpitations  She did report to having facial swelling and possible parotitis the year prior to diagnosis  She is eager to resume medical care for her malignancy and is undergoing gastroenterology evaluation  She has hematology evaluation next week  Review of Systems   Constitutional: Positive for unexpected weight change  Negative for activity change, chills, fatigue and fever  HENT: Positive for facial swelling  Negative for mouth sores, postnasal drip, sore throat and trouble swallowing  Eyes: Negative for pain  Respiratory: Negative for cough, chest tightness, shortness of breath and wheezing  Cardiovascular: Negative for chest pain and leg swelling  Gastrointestinal: Positive for blood in stool  Negative for abdominal pain, nausea and vomiting  Endocrine: Negative for cold intolerance and heat intolerance  Genitourinary: Negative for flank pain and hematuria  Musculoskeletal: Negative for arthralgias, gait problem and myalgias  Skin: Negative for rash  Allergic/Immunologic: Positive for immunocompromised state  Neurological: Negative for headaches  Hematological: Negative for adenopathy  Psychiatric/Behavioral: Negative for sleep disturbance  The patient is not nervous/anxious          Historical Information   Past Medical History:   Diagnosis Date   • Cancer [facility])    • Colon cancer [facility])    • Fall    • Headache    • Hemochromatosis, unspecified    • History of transfusion     2022 - no adverse reaction   • Kidney calculi    • Kidney stone    • Liver disease     hemangioma   • Muscle weakness    • Sarcoidosis    • Seizures ([address] )     2022     Past Surgical History:   Procedure Laterality Date   • ABSCESS DRAINAGE      left breast   • CHEST TUBE INSERTION     • COLON SURGERY      colostomy   • COLONOSCOPY     • FL RETROGRADE PYELOGRAM  1/23/2023   • LUNG BIOPSY     • WI CYSTO/URETERO W/LITHOTRIPSY &INDWELL STENT INSRT Bilateral 1/23/2023    Procedure: CYSTOSCOPY  RIGHT URETEROSCOPY WITH LITHOTRIPSY HOLMIUM LASER, BILATERAL RETROGRADE PYELOGRAM AND BILATERAL  URETERAL STENT INSERTION;  Surgeon: Cas Olivas MD;  Location: [facility];  Service: Urology   • TONSILLECTOMY     • URINARY SURGERY Bilateral     bilateral stents     Family History   Problem Relation Age of Onset   • Cancer Mother    • Cirrhosis Father        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, denied history or exposure to MTB, lifelong nonsmoker, no ETOH, 2 cats, daughter student at [employer]     Meds/Allergies      Current Outpatient Medications:   •  acetaminophen (TYLENOL) 325 mg tablet, Take 650 mg by mouth every 6 (six) hours as needed for mild pain, Disp: , Rfl:   •  amLODIPine (NORVASC) 5 mg tablet, Take 5 mg by mouth daily, Disp: , Rfl:   •  CAPECITABINE PO, Take 1,650 mg by mouth 2 (two) times a day, Disp: , Rfl:   •  cyanocobalamin (VITAMIN B-12) 1000 MCG tablet, Take 1,000 mcg by mouth daily, Disp: , Rfl:   •  Potassium Gluconate 550 MG TABS, Take 1 tablet by mouth in the morning, Disp: , Rfl:   •  POTASSIUM PO, Take by mouth, Disp: , Rfl:   •  predniSONE 10 mg tablet, Take 10 mg by mouth daily at bedtime, Disp: , Rfl:   •  predniSONE 20 mg tablet, Take 20 mg by mouth in the morning Takes in am, Disp: , Rfl:   •  tamsulosin (FLOMAX) 0 4 mg, Take 1 capsule (0 4 mg total) by mouth daily at bedtime, Disp: 15 capsule, Rfl: 0  •  vancomycin (VANCOCIN) 125 MG capsule, Take 1 capsule (125 mg total) by mouth 4 (four) times a day for 12 days, THEN 1 capsule (125 mg total) 2 (two) times a day for 7 days, THEN 1 capsule (125 mg total) in the morning for 7 days, THEN 1 capsule (125 mg total) every other day for 7 days, THEN 1 capsule (125 mg total) every 3 (three) days for 14 days  Do not start before February 14, 2023 , Disp: 78 capsule, Rfl: 0  •  diclofenac sodium (VOLTAREN) 50 mg EC tablet, Take 1 tablet (50 mg total) by mouth 3 (three) times a day for 7 days, Disp: 21 tablet, Rfl: 0  •  docusate sodium (COLACE) 100 mg capsule, Take 1 capsule (100 mg total) by mouth 2 (two) times a day for 15 days, Disp: 30 capsule, Rfl: 0  •  famotidine (PEPCID) 20 mg tablet, Take 20 mg by mouth 2 (two) times a day, Disp: , Rfl:   •  ferrous sulfate 325 (65 FE) MG EC tablet, Take 1 tablet by mouth 2 (two) times a day, Disp: , Rfl:   •  ondansetron (ZOFRAN) 8 mg tablet, TAKE 1 TABLET EVERY 12 HOURS AS NEEDED FOR CHEMO-INDUCED NAUSEA/VOMITING, Disp: , Rfl:   •  oxyCODONE-acetaminophen (PERCOCET) 5-325 mg per tablet, TAKE 1 TABLET BY MOUTH EVERY 6 HOURS AS NEEDED FOR MODERATE PAIN (SCORE 4-6), Disp: , Rfl:   •  potassium chloride (K-DUR,KLOR-CON) 20 mEq tablet, Take 2 tablets (40 mEq total) by mouth daily for 7 days Do not start before February 15, 2023 , Disp: 14 tablet, Rfl: 0  •  prochlorperazine (COMPAZINE) 10 mg tablet, TAKE 1 TABLET (10 MG TOTAL) BY MOUTH EVERY 6 (SIX) HOURS AS NEEDED (CHEMO INDUCED NAUSEA/VOMITING), Disp: , Rfl:   Allergies   Allergen Reactions   • Oxaliplatin Shortness Of Breath     Reactions occurred with 2nd and 3rd infusions (about 1-3 hours from initiation of infusion) and required treatment with steroids and antihistamines  Please refer to allergy note on 2/7/2023 for detailed description of her reactions         Vitals: Blood pressure 132/86, pulse 94, temperature 98 7 °F (37 1 °C), temperature source Tympanic, resp  rate 16, height 5' 2" (1 575 m), weight 64 4 kg (141 lb 14 4 oz), SpO2 97 %  , Body mass index is 25 95 kg/m²  Oxygen Therapy  SpO2: 97 %  Oxygen Therapy: None (Room air)    Physical Exam  Physical Exam  Vitals reviewed  Constitutional:       General: She is not in acute distress  Appearance: Normal appearance  She is well-developed and normal weight  She is not ill-appearing, toxic-appearing or diaphoretic  Comments: Mildly cushinoid appearance   HENT:      Head: Normocephalic and atraumatic  Right Ear: External ear normal       Left Ear: External ear normal       Nose: Nose normal       Mouth/Throat:      Mouth: Mucous membranes are moist       Pharynx: Oropharynx is clear  No oropharyngeal exudate  Eyes:      General: No scleral icterus  Right eye: No discharge  Left eye: No discharge  Conjunctiva/sclera: Conjunctivae normal       Pupils: Pupils are equal, round, and reactive to light  Neck:      Vascular: No JVD  Trachea: No tracheal deviation  Cardiovascular:      Rate and Rhythm: Normal rate and regular rhythm  Heart sounds: Normal heart sounds  No murmur heard  No gallop  Pulmonary:      Effort: Pulmonary effort is normal  No respiratory distress  Breath sounds: Normal breath sounds  No stridor  No wheezing, rhonchi or rales  Abdominal:      General: Bowel sounds are normal  There is no distension  Palpations: Abdomen is soft  Tenderness: There is no abdominal tenderness  There is no guarding or rebound  Comments: Colostomy in place with brown stool   Musculoskeletal:         General: No deformity  Right lower leg: No edema  Left lower leg: No edema  Lymphadenopathy:      Cervical: No cervical adenopathy  Skin:     General: Skin is warm and dry  Coloration: Skin is not jaundiced  Findings: No erythema or rash  Neurological:      General: No focal deficit present  Mental Status: She is alert and oriented to person, place, and time  Psychiatric:         Mood and Affect: Mood normal          Behavior: Behavior normal          Thought Content: Thought content normal          Labs: I have personally reviewed pertinent lab results  Lab Results   Component Value Date    WBC 6 74 02/13/2023    HGB 10 8 (L) 02/13/2023    HCT 34 5 (L) 02/13/2023     (H) 02/13/2023     (L) 02/13/2023     Lab Results   Component Value Date    CALCIUM 8 0 (L) 02/14/2023    K 3 1 (L) 02/14/2023    CO2 25 02/14/2023     (H) 02/14/2023    BUN 13 02/14/2023    CREATININE 0 55 (L) 02/14/2023     No results found for: IGE  Lab Results   Component Value Date    ALT 64 (H) 02/11/2023    AST 38 02/11/2023    ALKPHOS 351 (H) 02/11/2023     [address]   ACE 4/30/2022 = >360  ACE 5/2/2022 - >360  ACE 6/28/2022 -> 175    FINAL PATHOLOGIC DIAGNOSIS     A , B  PLEURA, LEFT, BIOPSIES #1 AND #2:  - NON-CASEATING GRANULOMATOUS INFLAMMATION (SEE NOTE)      NOTE: The pleura is thickened by innumerable non-caseating granulomas admixed with lymphocytes  AFB and GMS stains are pending and will be reported in an addendum  Tissue culture was also performed and those results are pending  In the absence of an infectious etiology the histologic findings, along with the clinical history and imaging, are highly suggestive of Sarcoidosis  Electronically signed by Shelli Whaley MD on 6/9/2022 at  2:00 PM   Intraoperative Consultation     A  PLEURA, LEFT, BIOPSY:  - NUMEROUS NON-NECROTIZING GRANULOMAS     Khadijah Brito MD; 3:23 PM; 6/7/2022   Clinical Information     UNSPECIFIED EXAMINATION   Gross Description     A    Received fresh for intraoperative consultation labeled with the patient's name, other identifying information and "left pleural biopsy" is a 0 9 x 0 4 x 0 2 cm aggregate of tan-gray soft friable soft tissue, submitted in toto for frozen section in A1       B  Received in formalin labeled with the patient's name and other identifying information and "left pleural biopsy #2" is a 2 2 x 2 2 x 0 6 cm aggregate of pink-tan irregular tissue fragments that are entirely submitted in 1 cassette      MZ   #1 ADDENDUM     AFB AND GMS SPECIAL STAINS ARE NEGATIVE FOR MYCOBACTERIA AND FUNGAL ELEMENTS RESPECTIVELY  Fungal and AFB cultures negative    FINAL PATHOLOGIC DIAGNOSIS     RECTOSIGMOID COLON, MASS, BIOPSY:  - INVASIVE MODERATELY DIFFERENTIATED COLONIC ADENOCARCINOMA  FINAL PATHOLOGIC DIAGNOSIS     LIVER, SEGMENT 3, RESECTION:  - HYALINIZED SUBCAPSULAR AND INTRA-PARENCHYMAL NODULES  NO MALIGNANCY SEEN  SEE NOTE  - MILD MACROVESICULAR STEATOSIS (25%)  NO EVIDENCE OF STEATOHEPATITIS  TRICHOME STAIN SHOWS MILD PORTAL FIBROSIS    - 2+ IRON DEPOSITION WITHIN KUPFFER CELLS (IRON STAIN), CONSISTENT WITH SECONDARY HEMOCHROMATOSIS         NOTE: THIS LIKELY REPRESENTS A MARKEDLY SCLEROTIC HEMANGIOMA  OR CHANGES SECONDARY TO INJURY  Imaging and other studies: I have personally reviewed pertinent reports  and I have personally reviewed pertinent films in PACS  CT Abd/Pelvis 2/11/2022 - decreased right hydronephrosis, stable 1cm right adrenal nodules, bilateral ureteral stents, basilar atelectasis without evidence of miliary pattern    CT [facility] 10/25/2022 - LUNGS: The trachea and central bronchi appear patent  Interval improvement in previously seen diffuse nodular opacities throughout both lungs  Stable subpleural opacity peripheral left lung base  A 1 2 cm pleural-based opacity inferior lingula probably representing subsegmental atelectatic changes  Pulmonary function testing:   3/7/2023 - Ratio 86%, FVC 2 78L (87%), FEV1 2 38L (95%) - normal spirometry    EKG, Pathology, and Other Studies: I have personally reviewed pertinent reports      TTE [facility] 10/2022 - EF 55%, mild MVR, normal RV size/funciton    Myocardial perfusion scan [facility] MS 10/2022 - normal myocardial perfusion study    Dandre Hopson DO, [facility]

## 2023-03-07 NOTE — PATIENT INSTRUCTIONS
Wean prednisone to 20mg daily  Get Chest Xray completed - may need to repeat CT chest in the future  Get regular blood drawn for gastroenterology and oncology  Get regular eye exam - Dr Rosa Maria Thomas  We will obtain records from [address]

## 2023-03-07 NOTE — PROGRESS NOTES
Pulmonary Consultation   Robert Burton 56 y o  female MRN: [id number]  3/7/2023      Reason for Consultation:  Sarcoid evaluation    Requested by: Dr Mcdonald :  1  Sarcoidosis   · Miliary pattern on CT, hypercalcemia, elevated ACE level and NC granulomas on pleural biopsy as listed below  · Clinically improved on prednisone - course complicated by numerous other medical problems and active colon cancer  · Spirometry is normal today  · Will begin plan for steroid taper - plan for prednisone 20mg daily  · Will not initiate bactrim therapy at this point given plans for further taper over the next 4-8 weeks  · Will need to monitor for worsened hypercalcemia  · Needs opthalmology exam and consult placed to see Dr Duwaine Merlin  · Obtain complete PFTs  · Obtain baseline CXR now, will hold on further CT imaging pending oncology evaluation  · Obtain records and CT images from [address]  · Follow up in 6-8 weeks    2  Colon Cancer post colostomy  · Awaiting oncology evaluation  · Will hold on further sarcoid therapies pending chemotherapy plans  · Do not suspect this is a systemic sarcoid reaction to her malignancy given elevated ACE level     3  Liver disease - possible  hemachromatosis   · No evidence of granulomas on biopsy  · Further evaluation and management per gastroenterology/hepatology  · Would not utilize methotrexate should she have prednisone relapse of her sarcoid    4  Recurrent nephrolithiasis with ureteral obstruction in setting of hypercalcemia  · Further care per urology  · Need to trend serial Calcium levels       Plan:    Diagnoses and all orders for this visit:    Sarcoidosis  -     POCT spirometry  -     Ambulatory Referral to Ophthalmology; Future  -     predniSONE 10 mg tablet; Take 2 tablets (20 mg total) by mouth daily    [facility] )  -     Ambulatory Referral to Pulmonology  -     Complete PFT with post bronchodilator;  Future  -     XR chest pa & lateral; Future  -     Angiotensin converting enzyme; Future    Abnormal CT of the chest    Malignant neoplasm of sigmoid colon (HCC)    R flank pain with bilateral hydronephrosis    Kidney calculi    Other hemochromatosis        History of Present Illness   HPI:  Pedro Sanchez is a 56 y o  female who presents for sarcoid evaluation  She has a complicated history and has primarily been treated at [facility]  She states she had 1-2 years of worsened dyspnea and chest pain  She had developed kidney stones with hypercalcemia (14 8 May 2022) and had CT revealing miliary pattern on her lung images  She was eventually seen by pulmonary (Dr Lillian Wellington) and referred for surgical pleural biopsy for miliary pattern and pleural thickening on CT Chest and concern for MTB vs sarcoid  Biopsy revealed NC granulomas and she was initiated on prednisone 40mg daily around July 2022  During this evaluation she had CT/PET which revealed lower sigmoid FDG avid lesion  She was diagnosed with colon cancer had diverting colostomy and started on chemotherapy and XRT  She had abnormal LFTs and underwent liver biopsy revealing concerns for hemochromatosis  She has moved to [place] which has delayed some further cancer therapies and she has stopped Capecitabine  She has had numerous admissions recently to include recurrent hydronephrosis post ureteral stent placement, Cdiff colitis, and colostomy bleeding  She presents today for sarcoid evaluation  She reports she had significant chest pain and dyspnea prior to starting prednisone  She has remained on 30mg prednisone for many months but feels improved  Has noted weight gain with prednisone but had prior weight loss and poor appetite  She has not been on TMP/Sulfa prophylaxis  She denied cough or sputum production, no hemoptysis, no pleurisy  She has resolved arthralgias and myalgias  She denied EN type rash  She has not had an eye exam  She denied palpitations   She did report to having facial swelling and possible parotitis the year prior to diagnosis  She is eager to resume medical care for her malignancy and is undergoing gastroenterology evaluation  She has hematology evaluation next week  Review of Systems   Constitutional: Positive for unexpected weight change  Negative for activity change, chills, fatigue and fever  HENT: Positive for facial swelling  Negative for mouth sores, postnasal drip, sore throat and trouble swallowing  Eyes: Negative for pain  Respiratory: Negative for cough, chest tightness, shortness of breath and wheezing  Cardiovascular: Negative for chest pain and leg swelling  Gastrointestinal: Positive for blood in stool  Negative for abdominal pain, nausea and vomiting  Endocrine: Negative for cold intolerance and heat intolerance  Genitourinary: Negative for flank pain and hematuria  Musculoskeletal: Negative for arthralgias, gait problem and myalgias  Skin: Negative for rash  Allergic/Immunologic: Positive for immunocompromised state  Neurological: Negative for headaches  Hematological: Negative for adenopathy  Psychiatric/Behavioral: Negative for sleep disturbance  The patient is not nervous/anxious          Historical Information   Past Medical History:   Diagnosis Date   • Cancer [facility])    • Colon cancer ([facility] )    • Fall    • Headache    • Hemochromatosis, unspecified    • History of transfusion     2022 - no adverse reaction   • Kidney calculi    • Kidney stone    • Liver disease     hemangioma   • Muscle weakness    • Sarcoidosis    • [facility]     2022     Past Surgical History:   Procedure Laterality Date   • ABSCESS DRAINAGE      left breast   • CHEST TUBE INSERTION     • COLON SURGERY      colostomy   • COLONOSCOPY     • FL RETROGRADE PYELOGRAM  1/23/2023   • LUNG BIOPSY     • NV CYSTO/URETERO W/LITHOTRIPSY &INDWELL STENT INSRT Bilateral 1/23/2023    Procedure: CYSTOSCOPY  RIGHT URETEROSCOPY WITH LITHOTRIPSY HOLMIUM LASER, BILATERAL RETROGRADE PYELOGRAM AND BILATERAL  URETERAL STENT INSERTION;  Surgeon: Carine Lyons MD;  Location: [facility];  Service: Urology   • TONSILLECTOMY     • URINARY SURGERY Bilateral     bilateral stents     Family History   Problem Relation Age of Onset   • Cancer Mother    • Cirrhosis Father        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    Social History: moved from [facility], denied history or exposure to MTB, lifelong nonsmoker, no ETOH, 2 cats, daughter student at [employer]     Meds/Allergies     Current Outpatient Medications:   •  acetaminophen (TYLENOL) 325 mg tablet, Take 650 mg by mouth every 6 (six) hours as needed for mild pain, Disp: , Rfl:   •  amLODIPine (NORVASC) 5 mg tablet, Take 5 mg by mouth daily, Disp: , Rfl:   •  CAPECITABINE PO, Take 1,650 mg by mouth 2 (two) times a day, Disp: , Rfl:   •  cyanocobalamin (VITAMIN B-12) 1000 MCG tablet, Take 1,000 mcg by mouth daily, Disp: , Rfl:   •  Potassium Gluconate 550 MG TABS, Take 1 tablet by mouth in the morning, Disp: , Rfl:   •  POTASSIUM PO, Take by mouth, Disp: , Rfl:   •  predniSONE 10 mg tablet, Take 10 mg by mouth daily at bedtime, Disp: , Rfl:   •  predniSONE 20 mg tablet, Take 20 mg by mouth in the morning Takes in am, Disp: , Rfl:   •  tamsulosin (FLOMAX) 0 4 mg, Take 1 capsule (0 4 mg total) by mouth daily at bedtime, Disp: 15 capsule, Rfl: 0  •  vancomycin (VANCOCIN) 125 MG capsule, Take 1 capsule (125 mg total) by mouth 4 (four) times a day for 12 days, THEN 1 capsule (125 mg total) 2 (two) times a day for 7 days, THEN 1 capsule (125 mg total) in the morning for 7 days, THEN 1 capsule (125 mg total) every other day for 7 days, THEN 1 capsule (125 mg total) every 3 (three) days for 14 days   Do not start before February 14, 2023 , Disp: 78 capsule, Rfl: 0  •  diclofenac sodium (VOLTAREN) 50 mg EC tablet, Take 1 tablet (50 mg total) by mouth 3 (three) times a day for 7 days, Disp: 21 tablet, Rfl: 0  •  docusate sodium (COLACE) 100 mg capsule, Take 1 capsule (100 mg total) by mouth 2 (two) times a day for 15 days, Disp: 30 capsule, Rfl: 0  •  famotidine (PEPCID) 20 mg tablet, Take 20 mg by mouth 2 (two) times a day, Disp: , Rfl:   •  ferrous sulfate 325 (65 FE) MG EC tablet, Take 1 tablet by mouth 2 (two) times a day, Disp: , Rfl:   •  ondansetron (ZOFRAN) 8 mg tablet, TAKE 1 TABLET EVERY 12 HOURS AS NEEDED FOR CHEMO-INDUCED NAUSEA/VOMITING, Disp: , Rfl:   •  oxyCODONE-acetaminophen (PERCOCET) 5-325 mg per tablet, TAKE 1 TABLET BY MOUTH EVERY 6 HOURS AS NEEDED FOR MODERATE PAIN (SCORE 4-6), Disp: , Rfl:   •  potassium chloride (K-DUR,KLOR-CON) 20 mEq tablet, Take 2 tablets (40 mEq total) by mouth daily for 7 days Do not start before February 15, 2023 , Disp: 14 tablet, Rfl: 0  •  prochlorperazine (COMPAZINE) 10 mg tablet, TAKE 1 TABLET (10 MG TOTAL) BY MOUTH EVERY 6 (SIX) HOURS AS NEEDED (CHEMO INDUCED NAUSEA/VOMITING), Disp: , Rfl:   Allergies   Allergen Reactions   • Oxaliplatin Shortness Of Breath     Reactions occurred with 2nd and 3rd infusions (about 1-3 hours from initiation of infusion) and required treatment with steroids and antihistamines  Please refer to allergy note on 2/7/2023 for detailed description of her reactions  Vitals: Blood pressure 132/86, pulse 94, temperature 98 7 °F (37 1 °C), temperature source Tympanic, resp  rate 16, height 5' 2" (1 575 m), weight 64 4 kg (141 lb 14 4 oz), SpO2 97 %  , Body mass index is 25 95 kg/m²  Oxygen Therapy  SpO2: 97 %  Oxygen Therapy: None (Room air)    Physical Exam  Physical Exam  Vitals reviewed  Constitutional:       General: She is not in acute distress  Appearance: Normal appearance  She is well-developed and normal weight  She is not ill-appearing, toxic-appearing or diaphoretic  Comments: Mildly cushinoid appearance   HENT:      Head: Normocephalic and atraumatic        Right Ear: External ear normal       Left Ear: External ear normal       Nose: Nose normal       Mouth/Throat:      Mouth: Mucous membranes are moist       Pharynx: Oropharynx is clear  No oropharyngeal exudate  Eyes:      General: No scleral icterus  Right eye: No discharge  Left eye: No discharge  Conjunctiva/sclera: Conjunctivae normal       Pupils: Pupils are equal, round, and reactive to light  Neck:      Vascular: No JVD  Trachea: No tracheal deviation  Cardiovascular:      Rate and Rhythm: Normal rate and regular rhythm  Heart sounds: Normal heart sounds  No murmur heard  No gallop  Pulmonary:      Effort: Pulmonary effort is normal  No respiratory distress  Breath sounds: Normal breath sounds  No stridor  No wheezing, rhonchi or rales  Abdominal:      General: Bowel sounds are normal  There is no distension  Palpations: Abdomen is soft  Tenderness: There is no abdominal tenderness  There is no guarding or rebound  Comments: Colostomy in place with brown stool   Musculoskeletal:         General: No deformity  Right lower leg: No edema  Left lower leg: No edema  Lymphadenopathy:      Cervical: No cervical adenopathy  Skin:     General: Skin is warm and dry  Coloration: Skin is not jaundiced  Findings: No erythema or rash  Neurological:      General: No focal deficit present  Mental Status: She is alert and oriented to person, place, and time  Psychiatric:         Mood and Affect: Mood normal          Behavior: Behavior normal          Thought Content: Thought content normal          Labs: I have personally reviewed pertinent lab results    Lab Results   Component Value Date    WBC 6 74 02/13/2023    HGB 10 8 (L) 02/13/2023    HCT 34 5 (L) 02/13/2023     (H) 02/13/2023     (L) 02/13/2023     Lab Results   Component Value Date    CALCIUM 8 0 (L) 02/14/2023    K 3 1 (L) 02/14/2023    CO2 25 02/14/2023     (H) 02/14/2023    BUN 13 02/14/2023 CREATININE 0 55 (L) 02/14/2023     No results found for: IGE  Lab Results   Component Value Date    ALT 64 (H) 02/11/2023    AST 38 02/11/2023    ALKPHOS 351 (H) 02/11/2023     [address]  ACE 4/30/2022 = >360  ACE 5/2/2022 - >360  ACE 6/28/2022 -> 175    FINAL PATHOLOGIC DIAGNOSIS     A , B  PLEURA, LEFT, BIOPSIES #1 AND #2:  - NON-CASEATING GRANULOMATOUS INFLAMMATION (SEE NOTE)      NOTE: The pleura is thickened by innumerable non-caseating granulomas admixed with lymphocytes  AFB and GMS stains are pending and will be reported in an addendum  Tissue culture was also performed and those results are pending  In the absence of an infectious etiology the histologic findings, along with the clinical history and imaging, are highly suggestive of Sarcoidosis  Electronically signed by Dusty Dugan MD on 6/9/2022 at  2:00 PM   Intraoperative Consultation     A  PLEURA, LEFT, BIOPSY:  - NUMEROUS NON-NECROTIZING GRANULOMAS     Jack Brito MD; 3:23 PM; 6/7/2022   Clinical Information     UNSPECIFIED EXAMINATION   Gross Description     A  Received fresh for intraoperative consultation labeled with the patient's name, other identifying information and "left pleural biopsy" is a 0 9 x 0 4 x 0 2 cm aggregate of tan-gray soft friable soft tissue, submitted in toto for frozen section in A1       B  Received in formalin labeled with the patient's name and other identifying information and "left pleural biopsy #2" is a 2 2 x 2 2 x 0 6 cm aggregate of pink-tan irregular tissue fragments that are entirely submitted in 1 cassette      MZ   #1 ADDENDUM     AFB AND GMS SPECIAL STAINS ARE NEGATIVE FOR MYCOBACTERIA AND FUNGAL ELEMENTS RESPECTIVELY  Fungal and AFB cultures negative    FINAL PATHOLOGIC DIAGNOSIS     RECTOSIGMOID COLON, MASS, BIOPSY:  - INVASIVE MODERATELY DIFFERENTIATED COLONIC ADENOCARCINOMA           FINAL PATHOLOGIC DIAGNOSIS     LIVER, SEGMENT 3, RESECTION:  - HYALINIZED SUBCAPSULAR AND INTRA-PARENCHYMAL NODULES  NO MALIGNANCY SEEN  SEE NOTE  - MILD MACROVESICULAR STEATOSIS (25%)  NO EVIDENCE OF STEATOHEPATITIS  TRICHOME STAIN SHOWS MILD PORTAL FIBROSIS    - 2+ IRON DEPOSITION WITHIN KUPFFER CELLS (IRON STAIN), CONSISTENT WITH SECONDARY HEMOCHROMATOSIS         NOTE: THIS LIKELY REPRESENTS A MARKEDLY SCLEROTIC HEMANGIOMA  OR CHANGES SECONDARY TO INJURY  Imaging and other studies: I have personally reviewed pertinent reports  and I have personally reviewed pertinent films in PACS  CT Abd/Pelvis 2/11/2022 - decreased right hydronephrosis, stable 1cm right adrenal nodules, bilateral ureteral stents, basilar atelectasis without evidence of miliary pattern    CT [facility] 10/25/2022 - LUNGS: The trachea and central bronchi appear patent  Interval improvement in previously seen diffuse nodular opacities throughout both lungs  Stable subpleural opacity peripheral left lung base  A 1 2 cm pleural-based opacity inferior lingula probably representing subsegmental atelectatic changes  Pulmonary function testing:   3/7/2023 - Ratio 86%, FVC 2 78L (87%), FEV1 2 38L (95%) - normal spirometry    EKG, Pathology, and Other Studies: I have personally reviewed pertinent reports  TTE [facility] 10/2022 - EF 55%, mild MVR, normal RV size/funciton    Myocardial perfusion scan [facility] 10/2022 - normal myocardial perfusion study    Dandre Hopson DO, [facility]

## 2023-03-13 ENCOUNTER — TELEPHONE (OUTPATIENT)
Dept: GASTROENTEROLOGY | Facility: CLINIC | Age: 58
End: 2023-03-13

## 2023-03-13 ENCOUNTER — APPOINTMENT (OUTPATIENT)
Dept: LAB | Facility: AMBULARY SURGERY CENTER | Age: 58
End: 2023-03-13

## 2023-03-13 ENCOUNTER — HOSPITAL ENCOUNTER (OUTPATIENT)
Dept: RADIOLOGY | Facility: HOSPITAL | Age: 58
Discharge: HOME | End: 2023-03-13
Attending: INTERNAL MEDICINE

## 2023-03-13 ENCOUNTER — HOSPITAL ENCOUNTER (OUTPATIENT)
Facility: HOSPITAL | Age: 58
Setting detail: OBSERVATION
Discharge: HOME | End: 2023-03-14
Attending: EMERGENCY MEDICINE | Admitting: INTERNAL MEDICINE

## 2023-03-13 DIAGNOSIS — Z01.812 PRE-OPERATIVE LABORATORY EXAMINATION: ICD-10-CM

## 2023-03-13 DIAGNOSIS — R39.89 SUSPECTED UTI: ICD-10-CM

## 2023-03-13 DIAGNOSIS — R74.8 ELEVATED LIVER ENZYMES: ICD-10-CM

## 2023-03-13 DIAGNOSIS — E87.6 HYPOKALEMIA: Primary | ICD-10-CM

## 2023-03-13 DIAGNOSIS — D86.0 PULMONARY SARCOIDOSIS (HCC): ICD-10-CM

## 2023-03-13 DIAGNOSIS — R73.9 ELEVATED BLOOD SUGAR: ICD-10-CM

## 2023-03-13 DIAGNOSIS — N20.1 URETERAL STONE: ICD-10-CM

## 2023-03-13 DIAGNOSIS — N13.30 BILATERAL HYDRONEPHROSIS: ICD-10-CM

## 2023-03-13 DIAGNOSIS — R31.0 GROSS HEMATURIA: ICD-10-CM

## 2023-03-13 DIAGNOSIS — E87.6 HYPOKALEMIA: ICD-10-CM

## 2023-03-13 DIAGNOSIS — R30.0 DYSURIA: ICD-10-CM

## 2023-03-13 LAB
ALBUMIN SERPL BCP-MCNC: 3.1 G/DL (ref 3.5–5)
ALBUMIN SERPL BCP-MCNC: 3.4 G/DL (ref 3.5–5)
ALP SERPL-CCNC: 366 U/L (ref 34–104)
ALP SERPL-CCNC: 391 U/L (ref 46–116)
ALT SERPL W P-5'-P-CCNC: 58 U/L (ref 7–52)
ALT SERPL W P-5'-P-CCNC: 76 U/L (ref 12–78)
ANION GAP SERPL CALCULATED.3IONS-SCNC: 3 MMOL/L (ref 4–13)
ANION GAP SERPL CALCULATED.3IONS-SCNC: 8 MMOL/L (ref 4–13)
ANION GAP SERPL CALCULATED.3IONS-SCNC: 9 MMOL/L (ref 4–13)
AST SERPL W P-5'-P-CCNC: 49 U/L (ref 13–39)
AST SERPL W P-5'-P-CCNC: 54 U/L (ref 5–45)
BASOPHILS # BLD AUTO: 0.01 THOUSANDS/ÂΜL (ref 0–0.1)
BASOPHILS NFR BLD AUTO: 0 % (ref 0–1)
BILIRUB SERPL-MCNC: 1.3 MG/DL (ref 0.2–1)
BILIRUB SERPL-MCNC: 1.6 MG/DL (ref 0.2–1)
BUN SERPL-MCNC: 11 MG/DL (ref 5–25)
BUN SERPL-MCNC: 14 MG/DL (ref 5–25)
BUN SERPL-MCNC: 15 MG/DL (ref 5–25)
CALCIUM ALBUM COR SERPL-MCNC: 10.4 MG/DL (ref 8.3–10.1)
CALCIUM ALBUM COR SERPL-MCNC: 9.5 MG/DL (ref 8.3–10.1)
CALCIUM SERPL-MCNC: 8.3 MG/DL (ref 8.4–10.2)
CALCIUM SERPL-MCNC: 9 MG/DL (ref 8.4–10.2)
CALCIUM SERPL-MCNC: 9.7 MG/DL (ref 8.3–10.1)
CHLORIDE SERPL-SCNC: 102 MMOL/L (ref 96–108)
CHLORIDE SERPL-SCNC: 108 MMOL/L (ref 96–108)
CHLORIDE SERPL-SCNC: 109 MMOL/L (ref 96–108)
CO2 SERPL-SCNC: 27 MMOL/L (ref 21–32)
CO2 SERPL-SCNC: 28 MMOL/L (ref 21–32)
CO2 SERPL-SCNC: 30 MMOL/L (ref 21–32)
CREAT SERPL-MCNC: 0.69 MG/DL (ref 0.6–1.3)
CREAT SERPL-MCNC: 0.79 MG/DL (ref 0.6–1.3)
CREAT SERPL-MCNC: 0.82 MG/DL (ref 0.6–1.3)
EOSINOPHIL # BLD AUTO: 0.01 THOUSAND/ÂΜL (ref 0–0.61)
EOSINOPHIL NFR BLD AUTO: 0 % (ref 0–6)
ERYTHROCYTE [DISTWIDTH] IN BLOOD BY AUTOMATED COUNT: 14.2 % (ref 11.6–15.1)
EST. AVERAGE GLUCOSE BLD GHB EST-MCNC: 126 MG/DL
GFR SERPL CREATININE-BSD FRML MDRD: 80 ML/MIN/1.73SQ M
GFR SERPL CREATININE-BSD FRML MDRD: 84 ML/MIN/1.73SQ M
GFR SERPL CREATININE-BSD FRML MDRD: 98 ML/MIN/1.73SQ M
GLUCOSE P FAST SERPL-MCNC: 93 MG/DL (ref 65–99)
GLUCOSE SERPL-MCNC: 260 MG/DL (ref 65–140)
GLUCOSE SERPL-MCNC: 293 MG/DL (ref 65–140)
HBA1C MFR BLD: 6 %
HCT VFR BLD AUTO: 39.1 % (ref 34.8–46.1)
HGB BLD-MCNC: 12.2 G/DL (ref 11.5–15.4)
IMM GRANULOCYTES # BLD AUTO: 0.04 THOUSAND/UL (ref 0–0.2)
IMM GRANULOCYTES NFR BLD AUTO: 1 % (ref 0–2)
LIPASE SERPL-CCNC: 127 U/L (ref 11–82)
LYMPHOCYTES # BLD AUTO: 0.34 THOUSANDS/ÂΜL (ref 0.6–4.47)
LYMPHOCYTES NFR BLD AUTO: 5 % (ref 14–44)
MAGNESIUM SERPL-MCNC: 1.9 MG/DL (ref 1.9–2.7)
MCH RBC QN AUTO: 31.2 PG (ref 26.8–34.3)
MCHC RBC AUTO-ENTMCNC: 31.2 G/DL (ref 31.4–37.4)
MCV RBC AUTO: 100 FL (ref 82–98)
MONOCYTES # BLD AUTO: 0.25 THOUSAND/ÂΜL (ref 0.17–1.22)
MONOCYTES NFR BLD AUTO: 4 % (ref 4–12)
NEUTROPHILS # BLD AUTO: 6.3 THOUSANDS/ÂΜL (ref 1.85–7.62)
NEUTS SEG NFR BLD AUTO: 90 % (ref 43–75)
NRBC BLD AUTO-RTO: 0 /100 WBCS
PLATELET # BLD AUTO: 180 THOUSANDS/UL (ref 149–390)
PMV BLD AUTO: 9.8 FL (ref 8.9–12.7)
POTASSIUM SERPL-SCNC: 2.3 MMOL/L (ref 3.5–5.3)
POTASSIUM SERPL-SCNC: 2.8 MMOL/L (ref 3.5–5.3)
POTASSIUM SERPL-SCNC: 2.9 MMOL/L (ref 3.5–5.3)
PROT SERPL-MCNC: 6.2 G/DL (ref 6.4–8.4)
PROT SERPL-MCNC: 6.7 G/DL (ref 6.4–8.4)
RBC # BLD AUTO: 3.91 MILLION/UL (ref 3.81–5.12)
SODIUM SERPL-SCNC: 139 MMOL/L (ref 135–147)
SODIUM SERPL-SCNC: 142 MMOL/L (ref 135–147)
SODIUM SERPL-SCNC: 143 MMOL/L (ref 135–147)
WBC # BLD AUTO: 6.95 THOUSAND/UL (ref 4.31–10.16)

## 2023-03-13 RX ORDER — LANOLIN ALCOHOL/MO/W.PET/CERES
400 CREAM (GRAM) TOPICAL 2 TIMES DAILY
Status: DISCONTINUED | OUTPATIENT
Start: 2023-03-14 | End: 2023-03-14 | Stop reason: HOSPADM

## 2023-03-13 RX ORDER — POTASSIUM CHLORIDE 20 MEQ/1
20 TABLET, EXTENDED RELEASE ORAL ONCE
Status: DISCONTINUED | OUTPATIENT
Start: 2023-03-13 | End: 2023-03-13

## 2023-03-13 RX ORDER — POTASSIUM CHLORIDE 20 MEQ/1
40 TABLET, EXTENDED RELEASE ORAL ONCE
Status: COMPLETED | OUTPATIENT
Start: 2023-03-13 | End: 2023-03-13

## 2023-03-13 RX ORDER — POTASSIUM CHLORIDE 14.9 MG/ML
20 INJECTION INTRAVENOUS ONCE
Status: COMPLETED | OUTPATIENT
Start: 2023-03-13 | End: 2023-03-13

## 2023-03-13 RX ADMIN — POTASSIUM CHLORIDE 20 MEQ: 14.9 INJECTION, SOLUTION INTRAVENOUS at 19:22

## 2023-03-13 RX ADMIN — POTASSIUM CHLORIDE 40 MEQ: 1500 TABLET, EXTENDED RELEASE ORAL at 19:14

## 2023-03-13 RX ADMIN — SODIUM CHLORIDE 500 ML: 0.9 INJECTION, SOLUTION INTRAVENOUS at 19:22

## 2023-03-13 RX ADMIN — POTASSIUM CHLORIDE 40 MEQ: 1500 TABLET, EXTENDED RELEASE ORAL at 23:57

## 2023-03-13 NOTE — TELEPHONE ENCOUNTER
Called and spoke to patient  Recommended ER for hypokalemia  She will go to [facility]  ADT order placed  Attempted getting in touch with ER to inform them however unsuccessful x2  Additionally called different number at [phone] without success

## 2023-03-13 NOTE — TELEPHONE ENCOUNTER
Patients GI provider:  Dayton Almaraz    Number to return call: [phone]    Reason for call: Meli Jacobsen from [facility] called in with a critical lab result for the potassium-it is 2 3  above is her call back number       Scheduled procedure/appointment date if applicable: Appt  9/8/37

## 2023-03-14 ENCOUNTER — TELEPHONE (OUTPATIENT)
Dept: HEMATOLOGY ONCOLOGY | Facility: CLINIC | Age: 58
End: 2023-03-14

## 2023-03-14 VITALS
TEMPERATURE: 97.4 F | SYSTOLIC BLOOD PRESSURE: 159 MMHG | OXYGEN SATURATION: 98 % | DIASTOLIC BLOOD PRESSURE: 83 MMHG | HEART RATE: 86 BPM | RESPIRATION RATE: 16 BRPM

## 2023-03-14 PROBLEM — R07.9 CHEST PAIN: Status: ACTIVE | Noted: 2023-03-14

## 2023-03-14 PROBLEM — R30.0 DYSURIA: Status: ACTIVE | Noted: 2023-03-14

## 2023-03-14 LAB
ACE SERPL-CCNC: 56 U/L (ref 14–82)
ANION GAP SERPL CALCULATED.3IONS-SCNC: 7 MMOL/L (ref 4–13)
ANION GAP SERPL CALCULATED.3IONS-SCNC: 9 MMOL/L (ref 4–13)
BACTERIA UR CULT: NORMAL
BACTERIA UR QL AUTO: ABNORMAL /HPF
BILIRUB UR QL STRIP: NEGATIVE
BUN SERPL-MCNC: 11 MG/DL (ref 5–25)
BUN SERPL-MCNC: 14 MG/DL (ref 5–25)
CALCIUM SERPL-MCNC: 8.4 MG/DL (ref 8.4–10.2)
CALCIUM SERPL-MCNC: 8.8 MG/DL (ref 8.4–10.2)
CHLORIDE SERPL-SCNC: 108 MMOL/L (ref 96–108)
CHLORIDE SERPL-SCNC: 112 MMOL/L (ref 96–108)
CLARITY UR: CLEAR
CO2 SERPL-SCNC: 24 MMOL/L (ref 21–32)
CO2 SERPL-SCNC: 25 MMOL/L (ref 21–32)
COLOR UR: ABNORMAL
CREAT SERPL-MCNC: 0.61 MG/DL (ref 0.6–1.3)
CREAT SERPL-MCNC: 0.7 MG/DL (ref 0.6–1.3)
ERYTHROCYTE [DISTWIDTH] IN BLOOD BY AUTOMATED COUNT: 14 % (ref 11.6–15.1)
GFR SERPL CREATININE-BSD FRML MDRD: 102 ML/MIN/1.73SQ M
GFR SERPL CREATININE-BSD FRML MDRD: 97 ML/MIN/1.73SQ M
GLUCOSE P FAST SERPL-MCNC: 92 MG/DL (ref 65–99)
GLUCOSE SERPL-MCNC: 217 MG/DL (ref 65–140)
GLUCOSE SERPL-MCNC: 92 MG/DL (ref 65–140)
GLUCOSE UR STRIP-MCNC: NEGATIVE MG/DL
HCT VFR BLD AUTO: 36.6 % (ref 34.8–46.1)
HGB BLD-MCNC: 11.4 G/DL (ref 11.5–15.4)
HGB UR QL STRIP.AUTO: ABNORMAL
KETONES UR STRIP-MCNC: NEGATIVE MG/DL
LEUKOCYTE ESTERASE UR QL STRIP: ABNORMAL
MAGNESIUM SERPL-MCNC: 1.9 MG/DL (ref 1.9–2.7)
MCH RBC QN AUTO: 31.1 PG (ref 26.8–34.3)
MCHC RBC AUTO-ENTMCNC: 31.1 G/DL (ref 31.4–37.4)
MCV RBC AUTO: 100 FL (ref 82–98)
NITRITE UR QL STRIP: NEGATIVE
NON-SQ EPI CELLS URNS QL MICRO: ABNORMAL /HPF
PH UR STRIP.AUTO: 7.5 [PH]
PLATELET # BLD AUTO: 163 THOUSANDS/UL (ref 149–390)
PMV BLD AUTO: 10 FL (ref 8.9–12.7)
POTASSIUM SERPL-SCNC: 2.9 MMOL/L (ref 3.5–5.3)
POTASSIUM SERPL-SCNC: 3.3 MMOL/L (ref 3.5–5.3)
PROT UR STRIP-MCNC: ABNORMAL MG/DL
RBC # BLD AUTO: 3.66 MILLION/UL (ref 3.81–5.12)
RBC #/AREA URNS AUTO: ABNORMAL /HPF
SODIUM SERPL-SCNC: 140 MMOL/L (ref 135–147)
SODIUM SERPL-SCNC: 145 MMOL/L (ref 135–147)
SP GR UR STRIP.AUTO: 1.01 (ref 1–1.03)
T4 FREE SERPL-MCNC: 1.04 NG/DL (ref 0.76–1.46)
TSH SERPL DL<=0.05 MIU/L-ACNC: 0.13 UIU/ML (ref 0.45–4.5)
UROBILINOGEN UR STRIP-ACNC: <2 MG/DL
WBC # BLD AUTO: 5.84 THOUSAND/UL (ref 4.31–10.16)
WBC #/AREA URNS AUTO: ABNORMAL /HPF

## 2023-03-14 RX ORDER — MAGNESIUM HYDROXIDE/ALUMINUM HYDROXICE/SIMETHICONE 120; 1200; 1200 MG/30ML; MG/30ML; MG/30ML
30 SUSPENSION ORAL EVERY 6 HOURS PRN
Status: DISCONTINUED | OUTPATIENT
Start: 2023-03-14 | End: 2023-03-14 | Stop reason: HOSPADM

## 2023-03-14 RX ORDER — ACETAMINOPHEN 325 MG/1
650 TABLET ORAL EVERY 6 HOURS PRN
Status: DISCONTINUED | OUTPATIENT
Start: 2023-03-14 | End: 2023-03-14 | Stop reason: HOSPADM

## 2023-03-14 RX ORDER — PHENAZOPYRIDINE HYDROCHLORIDE 100 MG/1
100 TABLET, FILM COATED ORAL
Status: DISCONTINUED | OUTPATIENT
Start: 2023-03-14 | End: 2023-03-14 | Stop reason: HOSPADM

## 2023-03-14 RX ORDER — POTASSIUM CHLORIDE 750 MG/1
40 CAPSULE, EXTENDED RELEASE ORAL 2 TIMES DAILY
Qty: 56 CAPSULE | Refills: 0 | Status: SHIPPED | OUTPATIENT
Start: 2023-03-14 | End: 2023-03-21

## 2023-03-14 RX ORDER — PREDNISONE 20 MG/1
20 TABLET ORAL DAILY
Status: DISCONTINUED | OUTPATIENT
Start: 2023-03-14 | End: 2023-03-14 | Stop reason: HOSPADM

## 2023-03-14 RX ORDER — POTASSIUM CHLORIDE 20 MEQ/1
40 TABLET, EXTENDED RELEASE ORAL ONCE
Status: COMPLETED | OUTPATIENT
Start: 2023-03-14 | End: 2023-03-14

## 2023-03-14 RX ORDER — ENOXAPARIN SODIUM 100 MG/ML
40 INJECTION SUBCUTANEOUS DAILY
Status: DISCONTINUED | OUTPATIENT
Start: 2023-03-14 | End: 2023-03-14 | Stop reason: HOSPADM

## 2023-03-14 RX ORDER — MAGNESIUM SULFATE 1 G/100ML
1 INJECTION INTRAVENOUS ONCE
Status: COMPLETED | OUTPATIENT
Start: 2023-03-14 | End: 2023-03-14

## 2023-03-14 RX ORDER — PHENAZOPYRIDINE HYDROCHLORIDE 100 MG/1
100 TABLET, FILM COATED ORAL
Qty: 21 TABLET | Refills: 0 | Status: SHIPPED | OUTPATIENT
Start: 2023-03-14 | End: 2023-03-21

## 2023-03-14 RX ORDER — POTASSIUM CHLORIDE 20 MEQ/1
20 TABLET, EXTENDED RELEASE ORAL 2 TIMES DAILY
Status: DISCONTINUED | OUTPATIENT
Start: 2023-03-14 | End: 2023-03-14 | Stop reason: HOSPADM

## 2023-03-14 RX ADMIN — PHENAZOPYRIDINE 100 MG: 100 TABLET ORAL at 07:49

## 2023-03-14 RX ADMIN — MAGNESIUM OXIDE TAB 400 MG (241.3 MG ELEMENTAL MG) 400 MG: 400 (241.3 MG) TAB at 08:19

## 2023-03-14 RX ADMIN — POTASSIUM CHLORIDE 20 MEQ: 1500 TABLET, EXTENDED RELEASE ORAL at 08:19

## 2023-03-14 RX ADMIN — ENOXAPARIN SODIUM 40 MG: 40 INJECTION SUBCUTANEOUS at 08:19

## 2023-03-14 RX ADMIN — PREDNISONE 20 MG: 20 TABLET ORAL at 08:19

## 2023-03-14 RX ADMIN — CYANOCOBALAMIN TAB 500 MCG 1000 MCG: 500 TAB at 08:19

## 2023-03-14 RX ADMIN — POTASSIUM CHLORIDE 40 MEQ: 1500 TABLET, EXTENDED RELEASE ORAL at 06:22

## 2023-03-14 RX ADMIN — MAGNESIUM SULFATE HEPTAHYDRATE 1 G: 1 INJECTION, SOLUTION INTRAVENOUS at 06:24

## 2023-03-14 RX ADMIN — PHENAZOPYRIDINE 100 MG: 100 TABLET ORAL at 12:15

## 2023-03-14 NOTE — ASSESSMENT & PLAN NOTE
Patient has history of sarcoidosis on prednisone currently  She follows with [facility]'s pulmonology  She denies any shortness of breath currently      Plan:  · Continue prednisone 20 mg daily

## 2023-03-14 NOTE — UTILIZATION REVIEW
Initial Clinical Review    Admission: Date/Time/Statement:   Admission Orders (From admission, onward)     Ordered        03/13/23 2307  Place in Observation  Once                      Orders Placed This Encounter   Procedures   • Place in Observation     Standing Status:   Standing     Number of Occurrences:   1     Order Specific Question:   Level of Care     Answer:   Med Surg [16]     ED Arrival Information     Expected   3/13/2023     Arrival   3/13/2023 16:34    Acuity   Emergent            Means of arrival   Walk-In    Escorted by   Self    Service   Hospitalist    Admission type   Emergency            Arrival complaint   Hypokalemia           Chief Complaint   Patient presents with   • Abnormal Lab     Pt is here for low serum K  Pt reports only symptom is feeling wobbly prior to getting labs which she was fasting for  Pt reports after eating she feels fine  Initial Presentation: 56 y o  female with a PMH of sarcoidosis on prednisone, history of C  difficile, colon cancer S/P diverting colectomy, hypokalemia who presents with abnormal outpatient lab work showing potassium of 2 3  Patient was contacted and advised to come to the emergency department for evaluation  She had no other complaints that brought her to the emergency department  Patient has history of hypokalemia and reports taking over-the-counter potassium supplementation and states she eats leafy greens  Plan: Observation for hypokalemia, chest pain, dysuria, sarcoidosis: started on 20 mEq KDur BID, telemetry, follow UA, pyridium, continue prednisone         ED Triage Vitals   Temperature Pulse Respirations Blood Pressure SpO2   03/13/23 1800 03/13/23 1800 03/13/23 1800 03/13/23 1800 03/13/23 1800   98 °F (36 7 °C) 93 18 (!) 177/101 98 %      Temp Source Heart Rate Source Patient Position - Orthostatic VS BP Location FiO2 (%)   03/13/23 1800 03/13/23 1800 03/13/23 1800 03/13/23 1800 --   Oral Monitor Sitting Right arm       Pain Score 03/14/23 0033       No Pain          Wt Readings from Last 1 Encounters:   03/07/23 64 4 kg (141 lb 14 4 oz)     Additional Vital Signs:     Date/Time Temp Pulse Resp BP MAP (mmHg) SpO2 O2 Device   03/14/23 06:37:20 97 4 °F (36 3 °C) Abnormal  86 16 159/83 108 98 % None (Room air)   03/14/23 0051 -- -- -- -- -- 95 % None (Room air)   03/14/23 00:33:52 98 °F (36 7 °C) 79 16 135/74 94 99 % --   03/13/23 2215 -- 83 18 159/77 111 96 % None (Room air)     Pertinent Labs/Diagnostic Test Results:   No orders to display         Results from last 7 days   Lab Units 03/14/23 0437 03/13/23  1808   WBC Thousand/uL 5 84 6 95   HEMOGLOBIN g/dL 11 4* 12 2   HEMATOCRIT % 36 6 39 1   PLATELETS Thousands/uL 163 180   NEUTROS ABS Thousands/µL  --  6 30         Results from last 7 days   Lab Units 03/14/23 0437 03/13/23 2216 03/13/23 1808 03/13/23  1112   SODIUM mmol/L 145 143 139 142   POTASSIUM mmol/L 2 9* 2 9* 2 8* 2 3*   CHLORIDE mmol/L 112* 108 102 109*   CO2 mmol/L 24 27 28 30   ANION GAP mmol/L 9 8 9 3*   BUN mg/dL 11 11 14 15   CREATININE mg/dL 0 61 0 69 0 82 0 79   EGFR ml/min/1 73sq m 102 98 80 84   CALCIUM mg/dL 8 4 8 3* 9 0 9 7   MAGNESIUM mg/dL 1 9 1 9  --   --      Results from last 7 days   Lab Units 03/13/23 1808 03/13/23  1112   AST U/L 49* 54*   ALT U/L 58* 76   ALK PHOS U/L 366* 391*   TOTAL PROTEIN g/dL 6 2* 6 7   ALBUMIN g/dL 3 4* 3 1*   TOTAL BILIRUBIN mg/dL 1 30* 1 60*         Results from last 7 days   Lab Units 03/14/23 0437 03/13/23 2216 03/13/23  1808   GLUCOSE RANDOM mg/dL 92 260* 293*         Results from last 7 days   Lab Units 03/13/23  1808   HEMOGLOBIN A1C % 6 0*   EAG mg/dl 126       Results from last 7 days   Lab Units 03/14/23  0437   TSH 3RD GENERATON uIU/mL 0 126*           Results from last 7 days   Lab Units 03/13/23  1808   LIPASE u/L 127*                 Results from last 7 days   Lab Units 03/14/23  0541   CLARITY UA  Clear   COLOR UA  Light Yellow   SPEC GRAV UA  1 008   PH UA  7 5 GLUCOSE UA mg/dl Negative   KETONES UA mg/dl Negative   BLOOD UA  Large*   PROTEIN UA mg/dl Trace*   NITRITE UA  Negative   BILIRUBIN UA  Negative   UROBILINOGEN UA (BE) mg/dl <2 0   LEUKOCYTES UA  Moderate*   WBC UA /hpf 10-20*   RBC UA /hpf Innumerable*   BACTERIA UA /hpf None Seen   EPITHELIAL CELLS WET PREP /hpf None Seen         ED Treatment:   Medication Administration from 03/13/2023 1551 to 03/14/2023 0030       Date/Time Order Dose Route Action     03/13/2023 1922 EDT potassium chloride 20 mEq IVPB (premix) 20 mEq Intravenous New Bag     03/13/2023 1914 EDT potassium chloride (K-DUR,KLOR-CON) CR tablet 40 mEq 40 mEq Oral Given     03/13/2023 1922 EDT sodium chloride 0 9 % bolus 500 mL 500 mL Intravenous New Bag     03/13/2023 2357 EDT potassium chloride (K-DUR,KLOR-CON) CR tablet 40 mEq 40 mEq Oral Given        Past Medical History:   Diagnosis Date   • Cancer ([facility] )    [facility]    • Fall    • Headache    • Hemochromatosis, unspecified    • History of transfusion     2022 - no adverse reaction   • Kidney calculi    • Kidney stone    • Liver disease     hemangioma   • Muscle weakness    • Sarcoidosis    • Se[facility]ures ([facility] )     2022     Present on Admission:  • Hypokalemia  • Sarcoidosis      Admitting Diagnosis: Hypokalemia [E87 6]  Low blood potassium [E87 6]  Elevated blood sugar [R73 9]  Age/Sex: 56 y o  female  Admission Orders:  Scheduled Medications:  cyanocobalamin, 1,000 mcg, Oral, Daily  enoxaparin, 40 mg, Subcutaneous, Daily  magnesium Oxide, 400 mg, Oral, BID  phenazopyridine, 100 mg, Oral, TID With Meals  potassium chloride, 20 mEq, Oral, BID  predniSONE, 20 mg, Oral, Daily      Continuous IV Infusions:     PRN Meds:  acetaminophen, 650 mg, Oral, Q6H PRN  aluminum-magnesium hydroxide-simethicone, 30 mL, Oral, Q6H PRN        None    Network Utilization Review Department  ATTENTION: Please call with any questions or concerns to [phone] and carefully listen to the prompts so that you are directed to the right person  All voicemails are confidential   Yen Findbri all requests for admission clinical reviews, approved or denied determinations and any other requests to dedicated fax number below belonging to the campus where the patient is receiving treatment   List of dedicated fax numbers for the Facilities:  [address] DENIALS (Administrative/Medical Necessity) [phone]   [address] (Maternity/NICU/Pediatrics) [phone]   913 Linda Peterson [phone]   [facility] Janet 77 [phone]   [address] [place] [address] Radha De La Rosa [place] [address] [phone]   1558 [facility] On license of [facility] 134 815 [facility] [phone]

## 2023-03-14 NOTE — ASSESSMENT & PLAN NOTE
Patient is complaining of dysuria for multiple months  She has no suprapubic tenderness  She reports occasional blood in the urine      Plan:  · Follow-up urinalysis  · Pyridium

## 2023-03-14 NOTE — ASSESSMENT & PLAN NOTE
Patient is complaining of dysuria for multiple months  She has no suprapubic tenderness  She reports occasional blood in the urine  History of recurrent kidney stones with recent stent placement  Plan:  · Urinalysis not concerning for UTI, however does have significant blood and leukocytosis, associated with painful urination  · Pyridium ordered for short term use outpatient  · ambulatory Nephrology referral made for hypokalemia and further evaluation of abnormal urinalysis

## 2023-03-14 NOTE — TELEPHONE ENCOUNTER
Patient Call Form   Reason for patient call? Patient is currently in the [facility] and her ex  Ciera Manjarrez is calling to see what they can do to get her in for her consult today  @3:20    Ciera Manjarrez would like a call back to discuss what they can do  Patient's primary oncologist? Dr Fanny Beltre    Name of person the patient was calling for? Dr Fanny Beltre   Does the patient issue require a call back? Yes   If call back required, inform patient that the message will be forwarded to the team and someone will get back to them as soon as possible    Did you relay this information to the patient?  Yes

## 2023-03-14 NOTE — PLAN OF CARE
Problem: PAIN - ADULT  Goal: Verbalizes/displays adequate comfort level or baseline comfort level  Description: Interventions:  - Encourage patient to monitor pain and request assistance  - Assess pain using appropriate pain scale  - Administer analgesics based on type and severity of pain and evaluate response  - Implement non-pharmacological measures as appropriate and evaluate response  - Consider cultural and social influences on pain and pain management  - Notify physician/advanced practitioner if interventions unsuccessful or patient reports new pain  Outcome: Progressing     Problem: INFECTION - ADULT  Goal: Absence or prevention of progression during hospitalization  Description: INTERVENTIONS:  - Assess and monitor for signs and symptoms of infection  - Monitor lab/diagnostic results  - Monitor all insertion sites, i e  indwelling lines, tubes, and drains  - Monitor endotracheal if appropriate and nasal secretions for changes in amount and color  - [place] appropriate cooling/warming therapies per order  - Administer medications as ordered  - Instruct and encourage patient and family to use good hand hygiene technique  - Identify and instruct in appropriate isolation precautions for identified infection/condition  Outcome: Progressing  Goal: Absence of fever/infection during neutropenic period  Description: INTERVENTIONS:  - Monitor WBC    Outcome: Progressing     Problem: SAFETY ADULT  Goal: Patient will remain free of falls  Description: INTERVENTIONS:  - Educate patient/family on patient safety including physical limitations  - Instruct patient to call for assistance with activity   - Consult OT/PT to assist with strengthening/mobility   - Keep Call bell within reach  - Keep bed low and locked with side rails adjusted as appropriate  - Keep care items and personal belongings within reach  - Initiate and maintain comfort rounds  - Make Fall Risk Sign visible to staff  - Offer Toileting every 2 Hours, in advance of need  - Initiate/Maintain bed/chair alarm  - Obtain necessary fall risk management equipment: bed alarm  - Apply yellow socks and bracelet for high fall risk patients  - Consider moving patient to room near nurses station  Outcome: Progressing  Goal: Maintain or return to baseline ADL function  Description: INTERVENTIONS:  -  Assess patient's ability to carry out ADLs; assess patient's baseline for ADL function and identify physical deficits which impact ability to perform ADLs (bathing, care of mouth/teeth, toileting, grooming, dressing, etc )  - Assess/evaluate cause of self-care deficits   - Assess range of motion  - Assess patient's mobility; develop plan if impaired  - Assess patient's need for assistive devices and provide as appropriate  - Encourage maximum independence but intervene and supervise when necessary  - Involve family in performance of ADLs  - Assess for home care needs following discharge   - Consider OT consult to assist with ADL evaluation and planning for discharge  - Provide patient education as appropriate  Outcome: Progressing  Goal: Maintains/Returns to pre admission functional level  Description: INTERVENTIONS:  - Perform BMAT or MOVE assessment daily    - Set and communicate daily mobility goal to care team and patient/family/caregiver  - Collaborate with rehabilitation services on mobility goals if consulted  - Perform Range of Motion 2 times a day  - Reposition patient every 2 hours    - Dangle patient 2 times a day  - Stand patient 2 times a day  - Ambulate patient 2 times a day  - Out of bed to chair 2 times a day   - Out of bed for meals 2 times a day  - Out of bed for toileting  - Record patient progress and toleration of activity level   Outcome: Progressing     Problem: DISCHARGE PLANNING  Goal: Discharge to home or other facility with appropriate resources  Description: INTERVENTIONS:  - Identify barriers to discharge w/patient and caregiver  - Arrange for needed discharge resources and transportation as appropriate  - Identify discharge learning needs (meds, wound care, etc )  - Arrange for interpretive services to assist at discharge as needed  - Refer to Case Management Department for coordinating discharge planning if the patient needs post-hospital services based on physician/advanced practitioner order or complex needs related to functional status, cognitive ability, or social support system  Outcome: Progressing     Problem: Knowledge Deficit  Goal: Patient/family/caregiver demonstrates understanding of disease process, treatment plan, medications, and discharge instructions  Description: Complete learning assessment and assess knowledge base    Interventions:  - Provide teaching at level of understanding  - Provide teaching via preferred learning methods  Outcome: Progressing

## 2023-03-14 NOTE — TELEPHONE ENCOUNTER
Patient currently admitted for low potassium  Consult appt cancelled  Cristal Coleman will update [facility] [place] when patient discharged

## 2023-03-14 NOTE — ASSESSMENT & PLAN NOTE
Patient presented potassium of 2 3 from outpatient lab work and was advised to come to the emergency department  In the emergency department she received 60 mEq potassium and an additional 40 mEq on admission  She reports taking over the counter potassium but does not recall the dosing      Plan:   · Continue to monitor potassium and replete as needed  · Started on 20 mEq KDur BID  · Monitor magnesium and replete as needed  · Monitor on telemetry

## 2023-03-14 NOTE — H&P
[facility]  H&P- Minor Hams 1965, 56 y o  female MRN: [id number]  Unit/Bed#: S -01 Encounter: [id number]  Primary Care Provider: Melony Shoemaker MD   Date and time admitted to hospital: 3/13/2023  6:30 PM    * Hypokalemia  Assessment & Plan  Patient presented potassium of 2 3 from outpatient lab work and was advised to come to the emergency department  In the emergency department she received 60 mEq potassium and an additional 40 mEq on admission  She reports taking over the counter potassium but does not recall the dosing  Plan:   · Continue to monitor potassium and replete as needed  · Started on 20 mEq KDur BID  · Monitor magnesium and replete as needed  · Monitor on telemetry     Chest pain  Assessment & Plan  Patient reports intermittent episodes of sharp, burning, nonradiating chest pain  These episodes last a few minutes and most recently occurred when she was lying down  Patient takes prednisone chronically for sarcoidosis  EKG showed no acute ST changes or ischemic changes  Patient states that chest pain resolved spontaneously  Patient stated she did not want scheduled antiacid medication  More likely acid reflux given steroid use and burning pain  Less likely cardiac given EKG    Plan:  · Mylanta as needed  · Consider repeat EKG if worsening chest pain      Dysuria  Assessment & Plan  Patient is complaining of dysuria for multiple months  She has no suprapubic tenderness  She reports occasional blood in the urine  Plan:  · Follow-up urinalysis  · Pyridium    Sarcoidosis  Assessment & Plan  Patient has history of sarcoidosis on prednisone currently  She follows with [facility]'s pulmonology  She denies any shortness of breath currently  Plan:  · Continue prednisone 20 mg daily    VTE Pharmacologic Prophylaxis: VTE Score: 4 Moderate Risk (Score 3-4) - Pharmacological DVT Prophylaxis Ordered: enoxaparin (Lovenox)    Code Status: Level 1 full code, Discussion with family: Patient declined call to   Anticipated Length of Stay: Patient will be admitted on an observation basis with an anticipated length of stay of less than 2 midnights secondary to hypokalemia  Chief Complaint: Abnormal outpatient lab work    History of Present Illness:  Amarilys Hannon is a 56 y o  female with a PMH of sarcoidosis on prednisone, history of C  difficile, colon cancer S/P diverting colectomy, hypokalemia who presents with abnormal outpatient lab work showing potassium of 2 3  Patient was contacted and advised to come to the emergency department for evaluation  She had no other complaints that brought her to the emergency department  Patient has history of hypokalemia and reports taking over-the-counter potassium supplementation and states she eats leafy greens  She denies any nausea, vomiting, or diarrhea and states she has good oral intake  She also complains of chronic burning with urination  She denies suprapubic pain  She has intermittent sharp, burning, nonradiating chest pain  Patient states most recent episode was yesterday when she was lying down  She denies any chest pain currently  Patient is on prednisone  In the emergency department, she received 20 mEq potassium IV and 40 mEq potassium oral     Review of Systems:  Review of Systems   Constitutional: Negative for activity change, chills and fever  HENT: Negative for congestion, rhinorrhea and sore throat  Respiratory: Negative for shortness of breath  Cardiovascular: Negative for chest pain and palpitations  Gastrointestinal: Negative for abdominal distention, abdominal pain, blood in stool, diarrhea, nausea and vomiting  Genitourinary: Positive for dysuria  Negative for difficulty urinating  Musculoskeletal: Negative for arthralgias  Skin: Negative  Allergic/Immunologic: Negative  Neurological: Negative  Negative for dizziness and light-headedness     Hematological: Negative  Past Medical and Surgical History:   Past Medical History:   Diagnosis Date   • Cancer [facility])    • Colon cancer ([address] )    • Fall    • Headache    • Hemochromatosis, unspecified    • History of transfusion     2022 - no adverse reaction   • Kidney calculi    • Kidney stone    • Liver disease     hemangioma   • Muscle weakness    • Sarcoidosis    • Seizures ([address] )     2022       Past Surgical History:   Procedure Laterality Date   • ABSCESS DRAINAGE      left breast   • CHEST TUBE INSERTION     • COLON SURGERY      colostomy   • COLONOSCOPY     • FL RETROGRADE PYELOGRAM  1/23/2023   • LUNG BIOPSY     • NC CYSTO/URETERO W/LITHOTRIPSY &INDWELL STENT INSRT Bilateral 1/23/2023    Procedure: CYSTOSCOPY  RIGHT URETEROSCOPY WITH LITHOTRIPSY HOLMIUM LASER, BILATERAL RETROGRADE PYELOGRAM AND BILATERAL  URETERAL STENT INSERTION;  Surgeon: Yobany Bertrand MD;  Location: [facility];  Service: Urology   • TONSILLECTOMY     • URINARY SURGERY Bilateral     bilateral stents       Meds/Allergies:  Prior to Admission medications    Medication Sig Start Date End Date Taking?  Authorizing Provider   acetaminophen (TYLENOL) 325 mg tablet Take 650 mg by mouth every 6 (six) hours as needed for mild pain   Yes Historical Provider, MD   cyanocobalamin (VITAMIN B-12) 1000 MCG tablet Take 1,000 mcg by mouth daily   Yes Historical Provider, MD   predniSONE 10 mg tablet Take 2 tablets (20 mg total) by mouth daily 3/7/23 5/6/23 Yes Medhat Hopson,    amLODIPine (NORVASC) 5 mg tablet Take 5 mg by mouth daily  Patient not taking: Reported on 3/13/2023 11/3/22   Historical Provider, MD   CAPECITABINE PO Take 1,650 mg by mouth 2 (two) times a day  Patient not taking: Reported on 3/13/2023    Historical Provider, MD   potassium chloride (K-DUR,KLOR-CON) 20 mEq tablet Take 2 tablets (40 mEq total) by mouth daily for 7 days Do not start before February 15, 2023  2/15/23 2/22/23  Ivy Pallas, MD   Potassium Gluconate 550 MG TABS Take 1 tablet by mouth in the morning    Historical Provider, MD   POTASSIUM PO Take by mouth    Historical Provider, MD   tamsulosin (FLOMAX) 0 4 mg Take 1 capsule (0 4 mg total) by mouth daily at bedtime  Patient not taking: Reported on 3/13/2023 1/27/23   Mayr Sal MD   vancomycin (VANCOCIN) 125 MG capsule Take 1 capsule (125 mg total) by mouth 4 (four) times a day for 12 days, THEN 1 capsule (125 mg total) 2 (two) times a day for 7 days, THEN 1 capsule (125 mg total) in the morning for 7 days, THEN 1 capsule (125 mg total) every other day for 7 days, THEN 1 capsule (125 mg total) every 3 (three) days for 14 days  Do not start before February 14, 2023  Patient not taking: Reported on 3/13/2023 2/14/23 4/2/23  Yolanda Cade PA-C     I have reviewed home medications with patient personally  Allergies: Allergies   Allergen Reactions   • Oxaliplatin Shortness Of Breath     Reactions occurred with 2nd and 3rd infusions (about 1-3 hours from initiation of infusion) and required treatment with steroids and antihistamines  Please refer to allergy note on 2/7/2023 for detailed description of her reactions         Social History:  Marital Status:    Occupation:   Patient Pre-hospital Living Situation: Home  Patient Pre-hospital Level of Mobility: walks  Patient Pre-hospital Diet Restrictions: None  Substance Use History:   Social History     Substance and Sexual Activity   Alcohol Use Never     Social History     Tobacco Use   Smoking Status Never   • Passive exposure: Past   Smokeless Tobacco Never     Social History     Substance and Sexual Activity   Drug Use Never       Family History:  Family History   Problem Relation Age of Onset   • Cancer Mother    • Cirrhosis Father        Physical Exam:     Vitals:   Blood Pressure: 159/77 (03/13/23 2215)  Pulse: 83 (03/13/23 2215)  Temperature: 98 °F (36 7 °C) (03/13/23 1800)  Temp Source: Oral (03/13/23 1800)  Respirations: 18 (03/13/23 2215)  SpO2: 96 % (03/13/23 2215)    Physical Exam  Constitutional:       General: She is not in acute distress  Appearance: Normal appearance  She is not ill-appearing  HENT:      Head: Normocephalic and atraumatic  Mouth/Throat:      Mouth: Mucous membranes are moist    Eyes:      General: No scleral icterus  Extraocular Movements: Extraocular movements intact  Pupils: Pupils are equal, round, and reactive to light  Cardiovascular:      Rate and Rhythm: Normal rate and regular rhythm  Pulses: Normal pulses  Heart sounds: Normal heart sounds  No murmur heard  No friction rub  No gallop  Pulmonary:      Effort: Pulmonary effort is normal       Breath sounds: Normal breath sounds  No wheezing, rhonchi or rales  Abdominal:      Palpations: Abdomen is soft  Tenderness: There is no abdominal tenderness  Comments: Colostomy in place with brown stool  Musculoskeletal:      Cervical back: Normal range of motion  Right lower leg: No edema  Left lower leg: No edema  Lymphadenopathy:      Cervical: No cervical adenopathy  Skin:     General: Skin is warm and dry  Neurological:      General: No focal deficit present  Mental Status: She is alert and oriented to person, place, and time     Psychiatric:         Mood and Affect: Mood normal          Behavior: Behavior normal           Additional Data:     Lab Results:  Results from last 7 days   Lab Units 03/13/23  1808   WBC Thousand/uL 6 95   HEMOGLOBIN g/dL 12 2   HEMATOCRIT % 39 1   PLATELETS Thousands/uL 180   NEUTROS PCT % 90*   LYMPHS PCT % 5*   MONOS PCT % 4   EOS PCT % 0     Results from last 7 days   Lab Units 03/13/23  2216 03/13/23  1808   SODIUM mmol/L 143 139   POTASSIUM mmol/L 2 9* 2 8*   CHLORIDE mmol/L 108 102   CO2 mmol/L 27 28   BUN mg/dL 11 14   CREATININE mg/dL 0 69 0 82   ANION GAP mmol/L 8 9   CALCIUM mg/dL 8 3* 9 0   ALBUMIN g/dL  --  3 4*   TOTAL BILIRUBIN mg/dL  --  1 30*   ALK PHOS U/L --  366*   ALT U/L  --  58*   AST U/L  --  49*   GLUCOSE RANDOM mg/dL 260* 293*             Results from last 7 days   Lab Units 03/13/23  1808   HEMOGLOBIN A1C % 6 0*           Lines/Drains:  Invasive Devices     Peripheral Intravenous Line  Duration           Peripheral IV 03/13/23 Left Antecubital <1 day          Drain  Duration           Colostomy Descending/sigmoid  days    Ureteral Internal Stent Left ureter 6 Fr  49 days    Ureteral Internal Stent Right ureter 6 Fr  49 days                    Imaging: No new imaging today obtained today  No orders to display       EKG and Other Studies Reviewed on Admission:   · EKG: NSR  HR 85     ** Please Note: This note has been constructed using a voice recognition system   **

## 2023-03-14 NOTE — ASSESSMENT & PLAN NOTE
Patient has history of sarcoidosis on prednisone currently  She follows with [facility]'s pulmonology  She denies any shortness of breath currently      Plan:  · Continue prednisone 20 mg daily

## 2023-03-14 NOTE — ASSESSMENT & PLAN NOTE
Patient reports intermittent episodes of sharp, burning, nonradiating chest pain  These episodes last a few minutes and most recently occurred when she was lying down  Patient takes prednisone chronically for sarcoidosis  EKG showed no acute ST changes or ischemic changes  Patient states that chest pain resolved spontaneously  Patient stated she did not want scheduled antiacid medication  More likely acid reflux given steroid use and burning pain  Less likely cardiac given EKG    Patient denies chest pain this morning, and states the chest pain last night felt like a tightness, rather than pressure or stabbing pain       Plan:  · Mylanta as needed  · Consider repeat EKG if worsening chest pain

## 2023-03-14 NOTE — ASSESSMENT & PLAN NOTE
Patient presented potassium of 2 3 from outpatient lab work and was advised to come to the emergency department  In the emergency department she received 60 mEq potassium and an additional 40 mEq on admission  She reports taking over the counter potassium but does not recall the dosing  Increased to 2 9 on 3/14 AM     Plan:   · Continue to monitor potassium and replete as needed  · Given 60 meq this AM with improvement to baseline of 3 2-3 4 K level  · Discharged with short term increase in potassium supplementation to 40 mEq BID from 20 mEq BID  Pt instructed to have BMP on Friday to check levels     · Monitor magnesium and replete as needed  · Monitored on telemetry

## 2023-03-14 NOTE — ASSESSMENT & PLAN NOTE
Patient reports intermittent episodes of sharp, burning, nonradiating chest pain  These episodes last a few minutes and most recently occurred when she was lying down  Patient takes prednisone chronically for sarcoidosis  EKG showed no acute ST changes or ischemic changes  Patient states that chest pain resolved spontaneously  Patient stated she did not want scheduled antiacid medication  More likely acid reflux given steroid use and burning pain    Less likely cardiac given EKG    Plan:  · Mylanta as needed  · Consider repeat EKG if worsening chest pain

## 2023-03-14 NOTE — DISCHARGE SUMMARY
[facility]  Discharge- Sarah Bridge 1965, 56 y o  female MRN: [id number]  Unit/Bed#: S -01 Encounter: [id number]  Primary Care Provider: Tobin Ludwig MD   Date and time admitted to hospital: 3/13/2023  6:30 PM    * Hypokalemia  Assessment & Plan  Patient presented potassium of 2 3 from outpatient lab work and was advised to come to the emergency department  In the emergency department she received 60 mEq potassium and an additional 40 mEq on admission  She reports taking over the counter potassium but does not recall the dosing  Increased to 2 9 on 3/14 AM     Plan:   · Continue to monitor potassium and replete as needed  · Given 60 meq this AM with improvement to baseline of 3 2-3 4 K level  · Discharged with short term increase in potassium supplementation to 40 mEq BID from 20 mEq BID  Pt instructed to have BMP on Friday to check levels  · Monitor magnesium and replete as needed  · Monitored on telemetry     Dysuria  Assessment & Plan  Patient is complaining of dysuria for multiple months  She has no suprapubic tenderness  She reports occasional blood in the urine  History of recurrent kidney stones with recent stent placement  Plan:  · Urinalysis not concerning for UTI, however does have significant blood and leukocytosis, associated with painful urination  · Pyridium ordered for short term use outpatient  · ambulatory Nephrology referral made for hypokalemia and further evaluation of abnormal urinalysis  Chest pain  Assessment & Plan  Patient reports intermittent episodes of sharp, burning, nonradiating chest pain  These episodes last a few minutes and most recently occurred when she was lying down  Patient takes prednisone chronically for sarcoidosis  EKG showed no acute ST changes or ischemic changes  Patient states that chest pain resolved spontaneously  Patient stated she did not want scheduled antiacid medication    More likely acid reflux given steroid use and burning pain  Less likely cardiac given EKG    Patient denies chest pain this morning, and states the chest pain last night felt like a tightness, rather than pressure or stabbing pain  Plan:  · Mylanta as needed  · Consider repeat EKG if worsening chest pain    Sarcoidosis  Assessment & Plan  Patient has history of sarcoidosis on prednisone currently  She follows with [facility]'s pulmonology  She denies any shortness of breath currently  Plan:  · Continue prednisone 20 mg daily      Medical Problems     Resolved Problems  Date Reviewed: 3/7/2023   None       Discharging Resident: Osito Dominguez MD  Discharging Attending: No att  providers found  PCP: Corrinne Lagos, MD  Admission Date:   Admission Orders (From admission, onward)     Ordered        03/13/23 2307  Place in Observation  Once                      Discharge Date: 03/14/23    Consultations During Hospital Stay:  · None    Procedures Performed:   · None    Significant Findings / Test Results:   No imaging results found  EKG did not show evidence of MI or arrhythmia  Incidental Findings:   None    Test Results Pending at Discharge (will require follow up): · None     Outpatient Tests Requested:  · Please have your blood drawn for a BMP on Friday  Complications: None    Reason for Admission: Abnormal lab value    Hospital Course:   Paresh Owusu is a 56 y o  female patient who originally presented to the hospital on 3/13/2023 due to hypokalemia of 2 3 on ambulatory labs  At presentation she had no other complaints  She was initially evaluated with EKG which was normal at the time of admission, and had been initiated on oral potassium as well as IV potassium  She has a history of not tolerating IV potassium very well  At that time, she was having intermittent sharp burning nonradiating chest pain, which she described as feeling like tightness in her chest   She was evaluated with EKG and this was unremarkable for acute MI    His chest pain resolved on its own  She received 100 mEq of potassium at around the time of admission, raising her potassium level to 2 9 that night  By the following morning 3/14, her potassium remained at 2 9  She was given an additional 60 mg oral potassium chloride, and reevaluated at noon  Her potassium had increased to 3 3, which appears to be approximately her baseline  She was discharged with instructions to take potassium 40 mEq twice daily instead of 20 mEq twice daily, and check her potassium level with a BMP on this following Friday for follow-up with her primary care doctor  She additionally complained of burning urination, which she states has been going on chronically  Burning with urination is also occasionally accompanied by hematuria  Urinalysis demonstrated significant hematuria and pyuria, but no bacteriuria, suspicious for a noninfectious process  Patient was given ambulatory referral for nephrology evaluation  Please see above list of diagnoses and related plan for additional information  Condition at Discharge: good    Discharge Day Visit / Exam:   Subjective: This morning, the patient is sitting upright and standing in the room  She states that she continues to have some burning with urination but it has been helped by the phenazopyridine, and she request that this be prescribed on discharge  She denies any further chest pain from yesterday, and correlates her previous chest pain with the IV potassium she was being given  She denies any fevers, chills, shortness of breath, palpitations, abdominal pain, nausea, or vomiting  She says that she has been having rectal output the previous week, and completed an oral vancomycin course  She also states that she has not had increased output through her ostomy    Vitals: Blood Pressure: 159/83 (03/14/23 0637)  Pulse: 86 (03/14/23 0637)  Temperature: (!) 97 4 °F (36 3 °C) (03/14/23 0637)  Temp Source: Oral (03/14/23 228)  Respirations: 16 (03/14/23 0637)  SpO2: 98 % (03/14/23 0402)  Exam:   Physical Exam  Vitals and nursing note reviewed  Constitutional:       General: She is not in acute distress  Appearance: She is well-developed  HENT:      Head: Normocephalic and atraumatic  Right Ear: External ear normal       Left Ear: External ear normal       Nose: No congestion  Eyes:      General: No scleral icterus  Conjunctiva/sclera: Conjunctivae normal    Cardiovascular:      Rate and Rhythm: Normal rate and regular rhythm  Heart sounds: No murmur heard  Pulmonary:      Effort: Pulmonary effort is normal  No respiratory distress  Breath sounds: Normal breath sounds  No wheezing or rales  Abdominal:      Palpations: Abdomen is soft  Tenderness: There is no abdominal tenderness  Comments: Patient has ostomy bag over left abdomen, with brown partially formed stool  Musculoskeletal:         General: No swelling  Cervical back: Neck supple  Right lower leg: No edema  Left lower leg: No edema  Skin:     General: Skin is warm and dry  Capillary Refill: Capillary refill takes less than 2 seconds  Neurological:      Mental Status: She is alert and oriented to person, place, and time  Psychiatric:         Mood and Affect: Mood normal          Behavior: Behavior normal          Thought Content: Thought content normal           Discussion with Family: Updated  (friend) via phone  Leon Hernández, patient's primary contact  Discharge instructions/Information to patient and family:   See after visit summary for information provided to patient and family  Provisions for Follow-Up Care:  See after visit summary for information related to follow-up care and any pertinent home health orders         Disposition:   Home    Planned Readmission: None    Discharge Medications:  See after visit summary for reconciled discharge medications provided to patient and/or family        **Please Note: This note may have been constructed using a voice recognition system**

## 2023-03-14 NOTE — DISCHARGE INSTR - AVS FIRST PAGE
Dear Fabi Barba,     It was our pleasure to care for you here at [facility]  It is our hope that we were always able to exceed the expected standards for your care during your stay  You were hospitalized due to hypokalemia  You were cared for on the fourth floor by Luis Miles MD under the service of Britta Rodriguez MD with the [facility] who covers for your primary care physician (PCP), Bartolo Bernal MD, while you were hospitalized  If you have any questions or concerns related to this hospitalization, you may contact us at [phone]  For follow up as well as any medication refills, we recommend that you follow up with your primary care physician  A registered nurse will reach out to you by phone within a few days after your discharge to answer any additional questions that you may have after going home  However, at this time we provide for you here, the most important instructions / recommendations at discharge:     Notable Medication Adjustments -   Please start taking your potassium chloride as 40 mEq in the morning, and 40 mEq in the evening, for 7 days  Please discuss with your primary care doctor within the next 7 days to decide if you can continue this amount of potassium  Please take phenazopyridine 3 times daily for your pain with urination  Please discuss with your doctor within a week to decide if he can continue taking this medication  Testing Required after Discharge -   Please go to the lab and have a BMP drawn to reassess your potassium on this Friday  This will help your primary care doctor decide how to proceed with your potassium levels  Important follow up information -   Please follow-up with your primary care doctor in 1 week to decide how to proceed with your potassium management    We have placed a referral for you to see a nephrologist (kidney doctor) regarding your problems with urination and abnormal urine studies  Other Instructions -   None  Please review this entire after visit summary as additional general instructions including medication list, appointments, activity, diet, any pertinent wound care, and other additional recommendations from your care team that may be provided for you        Sincerely,     Angie Stephen MD

## 2023-03-15 ENCOUNTER — OFFICE VISIT (OUTPATIENT)
Dept: INTERNAL MEDICINE CLINIC | Facility: CLINIC | Age: 58
End: 2023-03-15

## 2023-03-15 ENCOUNTER — TELEPHONE (OUTPATIENT)
Dept: INTERNAL MEDICINE CLINIC | Facility: CLINIC | Age: 58
End: 2023-03-15

## 2023-03-15 ENCOUNTER — TRANSITIONAL CARE MANAGEMENT (OUTPATIENT)
Dept: INTERNAL MEDICINE CLINIC | Facility: CLINIC | Age: 58
End: 2023-03-15

## 2023-03-15 ENCOUNTER — PATIENT MESSAGE (OUTPATIENT)
Dept: NEPHROLOGY | Facility: CLINIC | Age: 58
End: 2023-03-15

## 2023-03-15 ENCOUNTER — DOCUMENTATION (OUTPATIENT)
Dept: NEPHROLOGY | Facility: CLINIC | Age: 58
End: 2023-03-15

## 2023-03-15 ENCOUNTER — PATIENT OUTREACH (OUTPATIENT)
Dept: HEMATOLOGY ONCOLOGY | Facility: CLINIC | Age: 58
End: 2023-03-15

## 2023-03-15 VITALS
OXYGEN SATURATION: 99 % | HEIGHT: 62 IN | DIASTOLIC BLOOD PRESSURE: 82 MMHG | WEIGHT: 144.6 LBS | SYSTOLIC BLOOD PRESSURE: 142 MMHG | HEART RATE: 106 BPM | BODY MASS INDEX: 26.61 KG/M2

## 2023-03-15 DIAGNOSIS — Z12.31 ENCOUNTER FOR SCREENING MAMMOGRAM FOR BREAST CANCER: ICD-10-CM

## 2023-03-15 DIAGNOSIS — R31.0 GROSS HEMATURIA: ICD-10-CM

## 2023-03-15 DIAGNOSIS — Z11.59 NEED FOR HEPATITIS C SCREENING TEST: ICD-10-CM

## 2023-03-15 DIAGNOSIS — I10 PRIMARY HYPERTENSION: ICD-10-CM

## 2023-03-15 DIAGNOSIS — Z23 ENCOUNTER FOR IMMUNIZATION: ICD-10-CM

## 2023-03-15 DIAGNOSIS — R74.01 TRANSAMINITIS: ICD-10-CM

## 2023-03-15 DIAGNOSIS — R79.89 LOW TSH LEVEL: ICD-10-CM

## 2023-03-15 DIAGNOSIS — D86.9 SARCOIDOSIS: ICD-10-CM

## 2023-03-15 DIAGNOSIS — R73.01 IMPAIRED FASTING GLUCOSE: ICD-10-CM

## 2023-03-15 DIAGNOSIS — Z00.00 HEALTHCARE MAINTENANCE: ICD-10-CM

## 2023-03-15 DIAGNOSIS — E87.6 HYPOKALEMIA: Primary | ICD-10-CM

## 2023-03-15 DIAGNOSIS — Z12.4 SCREENING FOR CERVICAL CANCER: ICD-10-CM

## 2023-03-15 RX ORDER — PREDNISONE 10 MG/1
20 TABLET ORAL DAILY
COMMUNITY
End: 2023-03-15

## 2023-03-15 NOTE — PROGRESS NOTES
Name: Lori Santoyo      : 1965      MRN: [id number]  Encounter Provider: Everett Cash MD  Encounter Date: 3/15/2023   [facility]    Assessment & Plan     1  Hypokalemia  Assessment & Plan:  Continue 40 mEq bid  Recheck BMP on Friday  Pt reports good appetite and no diarrhea      2  Need for hepatitis C screening test    3  Encounter for immunization    4  Screening for cervical cancer    5  Encounter for screening mammogram for breast cancer    6  Sarcoidosis  Assessment & Plan:  F/u with pulm  On prednisone      7  Gross hematuria  Assessment & Plan:  Hematuria again in hospital, per note likely non-infectious etiology  Known kidney stone s/p stent  Has appointment with urology  Was also referral to renal    Pyridium was added during this hospital stay and helped with her dysuria  8  Impaired fasting glucose  Assessment & Plan:  Continue to monitor  9  Primary hypertension  Assessment & Plan:  Not on meds  Does not check BP at home  BP in office today elevated  Recommend to get a BP machine and start to check at home  In hospital she had several readings with -170  If continues to have BP elevated will restart amlodipine      10  Transaminitis  Assessment & Plan:  Continues to be elevated but stable, slightly better  Had liver biopsy before per note sign of hemochromatosis otherwise unremarkable  F/u with GI      11  Low TSH level  Assessment & Plan:  Low tsh with normal ft4  This was checked in hospital  Will recheck in a month      12  Healthcare maintenance  Assessment & Plan:  There are several issues need follow up:  Mammo, cervical cancer screen, vaccinations  Pt's priority now is to establish care with new oncologist to have a plan for her cancer treatment  Will follow up on health maintenance issues   Also needs to get consent to get access to her previous med record in [address] Call     Date and time call was made 2/15/2023 11:37 AM    Hospital care reviewed  Records not available    Patient was hospitialized at  [address] [facility]    Date of Admission  02/10/23    Date of discharge  02/14/23    Diagnosis  Bleeding from colostomy [facility])    Disposition  Home    Were the patients medications reviewed and updated  No      TCM Call     Should patient be enrolled in anticoag monitoring? No    Scheduled for follow up? Yes    I have advised the patient to call PCP with any new or worsening symptoms  Dayne Mercer - MR/MA            Subjective      HPI   Was just discharged yesterday for hypokalemia  Med change: potassium 40mEq bid, added pyridium    Pt is otherwise relatively stable  Her prednisone dose was decreased by pulm to 20mg daily    She ran out of capecitabine about a month ago not able to get refill  Sees new oncologist tomorrow     Review of Systems    Current Outpatient Medications on File Prior to Visit   Medication Sig   • acetaminophen (TYLENOL) 325 mg tablet Take 650 mg by mouth every 6 (six) hours as needed for mild pain   • CAPECITABINE PO Take 1,650 mg by mouth 2 (two) times a day   • cyanocobalamin (VITAMIN B-12) 1000 MCG tablet Take 1,000 mcg by mouth daily   • phenazopyridine (PYRIDIUM) 100 mg tablet Take 1 tablet (100 mg total) by mouth 3 (three) times a day with meals for 7 days   • potassium chloride (MICRO-K) 10 MEQ CR capsule Take 4 capsules (40 mEq total) by mouth 2 (two) times a day for 7 days   • Potassium Gluconate 550 MG TABS Take 1 tablet by mouth in the morning   • POTASSIUM PO Take by mouth   • predniSONE 10 mg tablet Take 2 tablets (20 mg total) by mouth daily   • [DISCONTINUED] predniSONE 10 mg tablet Take 20 mg by mouth daily   • amLODIPine (NORVASC) 5 mg tablet Take 5 mg by mouth daily (Patient not taking: Reported on 3/13/2023)   • [DISCONTINUED] tamsulosin (FLOMAX) 0 4 mg Take 1 capsule (0 4 mg total) by mouth daily at bedtime (Patient not taking: Reported on 3/13/2023)   • [DISCONTINUED] vancomycin (VANCOCIN) 125 MG capsule Take 1 capsule (125 mg total) by mouth 4 (four) times a day for 12 days, THEN 1 capsule (125 mg total) 2 (two) times a day for 7 days, THEN 1 capsule (125 mg total) in the morning for 7 days, THEN 1 capsule (125 mg total) every other day for 7 days, THEN 1 capsule (125 mg total) every 3 (three) days for 14 days  Do not start before February 14, 2023   (Patient not taking: Reported on 3/13/2023)       Objective     /82 (BP Location: Left arm, Patient Position: Sitting, Cuff Size: Standard)   Pulse (!) 106   Ht 5' 2" (1 575 m)   Wt 65 6 kg (144 lb 9 6 oz)   SpO2 99%   BMI 26 45 kg/m²     Physical Exam  Garo Romero MD

## 2023-03-15 NOTE — ASSESSMENT & PLAN NOTE
Hematuria again in hospital, per note likely non-infectious etiology  Known kidney stone s/p stent  Has appointment with urology  Was also referral to renal    Pyridium was added during this hospital stay and helped with her dysuria

## 2023-03-15 NOTE — PROGRESS NOTES
Spoke with pts friend Dayton Peacock, consult appt with Dr Bela Chow has now been scheduled for 3/16, [facility]  Date, time and location all confirmed    He understood and was thankful for the call

## 2023-03-15 NOTE — TELEPHONE ENCOUNTER
----- Message from Bonifacio Thompson MD sent at 3/14/2023  5:14 PM EDT -----  Regarding: Patient hospitalization  Hello Dr Lavinia Evans,    I had the pleasure of taking care of your patient Jo Umanzor during her visit to [place] [address]  yesterday and today  She originally presented to the hospital on 3/13/2023 due to hypokalemia of 2 3 on ambulatory labs  At presentation she had no other complaints  She was initially evaluated with EKG which was normal at the time of admission, and had been initiated on oral potassium as well as IV potassium  At that time, she was having intermittent sharp burning nonradiating chest pain, which she described as feeling like tightness in her chest   She was evaluated with EKG and this was unremarkable for acute MI  Her chest pain resolved on its own  She received 100 mEq of potassium at around the time of admission, raising her potassium level to 2 9 that night  By the following morning 3/14, her potassium remained at 2 9  She was given an additional 60 mg oral potassium chloride, and reevaluated at noon  Her potassium had increased to 3 3, which appears to be approximately her baseline  She was discharged with instructions to take potassium 40 mEq twice daily instead of 20 mEq twice daily, and check her potassium level with a BMP on this following Friday for follow-up with you at some point in the next 7 days  She additionally complained of burning urination, which she states has been going on chronically  Burning with urination is also occasionally accompanied by hematuria  Urinalysis demonstrated significant hematuria and pyuria, but no bacteriuria, suspicious for a noninfectious process  She was given ambulatory referral for nephrology evaluation  If you have any questions about her care, please feel free to reach out over message or Tiger connect      Best regards,   Lilia Sanders

## 2023-03-15 NOTE — ASSESSMENT & PLAN NOTE
Not on meds  Does not check BP at home  BP in office today elevated  Recommend to get a BP machine and start to check at home     In hospital she had several readings with -170  If continues to have BP elevated will restart amlodipine

## 2023-03-15 NOTE — ASSESSMENT & PLAN NOTE
There are several issues need follow up:  Mammo, cervical cancer screen, vaccinations  Pt's priority now is to establish care with new oncologist to have a plan for her cancer treatment  Will follow up on health maintenance issues   Also needs to get consent to get access to her previous med record in [place]

## 2023-03-15 NOTE — ASSESSMENT & PLAN NOTE
Continues to be elevated but stable, slightly better  Had liver biopsy before per note sign of hemochromatosis otherwise unremarkable  F/u with GI

## 2023-03-16 ENCOUNTER — CONSULT (OUTPATIENT)
Dept: HEMATOLOGY ONCOLOGY | Facility: CLINIC | Age: 58
End: 2023-03-16

## 2023-03-16 VITALS
WEIGHT: 146 LBS | BODY MASS INDEX: 26.87 KG/M2 | SYSTOLIC BLOOD PRESSURE: 152 MMHG | RESPIRATION RATE: 16 BRPM | HEART RATE: 123 BPM | HEIGHT: 62 IN | OXYGEN SATURATION: 97 % | DIASTOLIC BLOOD PRESSURE: 84 MMHG | TEMPERATURE: 97.7 F

## 2023-03-16 DIAGNOSIS — R79.89 ABNORMAL LFTS: ICD-10-CM

## 2023-03-16 DIAGNOSIS — C18.7 MALIGNANT NEOPLASM OF SIGMOID COLON (HCC): Primary | ICD-10-CM

## 2023-03-16 NOTE — PROGRESS NOTES
Keaton Cleveland  1965  [facility] [place]  [address]  [place] [id number]   HEMATOLOGY/ONCOLOGY CONSULT    DISCUSSION/SUMMARY:    44-year-old female recently diagnosed with rectal cancer  Issues:    Rectal cancer  Clinical stage from another institution is cT3 cN0 cM0  Patient required upfront diverting procedure - reason not entirely clear (patient did not know), presumed pain, bleeding, obstruction  Mrs Noemi Pena apparently was able to tolerate the first cycle of CapeOX but developed a severe reaction on the second cycle  There are notes in Care Everywhere where the plan at [facility] was to desensitize the patient to oxaliplatin  Respectfully, I do not believe this is now necessary, wasted some time  Last Xeloda was 8 weeks ago  To be absolutely certain that there is no evidence of disease progression or metastatic disease, patient is to go for repeat CAT scan of the chest   Patient will also have a repeat MRI/pelvis staging  CEA level requested  NCCN guidelines 4 2022 state that for patients with T3, N any with clear CRM by MRI, neoadjuvant options include the standard chemotherapy for 3 to 4 months, long course chemo RT and then restaging with evaluation for resection  Another option includes upfront long course chemo RT with either capecitabine or fusion or 5-FU  I will speak to Dr Laxmi Monzon but I think it best that patient proceed with long course chemo RT with 5-FU (as long as there is no evidence for disease progression/metastatic disease)  Standard dosing schedule with concurrent RT is 5- mg/m2 IV over 24 hours x 5 days for approximately 5 weeks (first time I met the patient today, not sure she could tolerate 7 days of continuous infusion 5-FU)  This was discussed with patient and , they are in agreement  Scan request placed; IR consulted for port placement  Sarcoidosis    This is a relatively new diagnosis  No significant respiratory issues at this time  Patient is on a prednisone taper  Patient is being followed by pulmonary  Concern for hemochromatosis (pulmonary note from March 7, 2023)  HFE gene mutation analysis requested  Nephrolithiasis, ureteral obstruction  Patient follows up with   Patient is to return in 3 weeks but this may change depending upon the above  Mrs Venancio Hoyt knows to call the hematology/oncology office if there are any other questions or concerns  Carefully review your medication list and verify that the list is accurate and up-to-date  Please call the hematology/oncology office if there are medications missing from the list, medications on the list that you are not currently taking or if there is a dosage or instruction that is different from how you're taking that medication  Patient goals and areas of care: Clarify stage, clarify no evidence of disease progression, speak with radiation oncology  Barriers to care: none  Patient is able to self-care   _____________________________________________________________________________________    Chief Complaint   Patient presents with   • Rectal Cancer   • Consult     Oncology History   Malignant neoplasm of sigmoid colon ([facility] [address] )   10/28/2022 Biopsy    Colon, Rectosigmoid Colon Mass Biopsy (1 slide [address] , collected 10/28/2022):  - Adenocarcinoma arising in a tubular adenoma     12/1/2022 Biopsy    DIAGNOSIS LIVER, SEGMENT 3, RESECTION:  - HYALINIZED SUBCAPSULAR AND INTRA-PARENCHYMAL NODULES  NO MALIGNANCY SEEN  SEE NOTE  - MILD MACROVESICULAR STEATOSIS (25%)  NO EVIDENCE OF STEATOHEPATITIS  TRICHOME STAIN SHOWS MILD PORTAL FIBROSIS    - 2+ IRON DEPOSITION WITHIN KUPFFER CELLS (IRON STAIN), CONSISTENT WITH SECONDARY HEMOCHROMATOSIS  NOTE: THIS LIKELY REPRESENTS A MARKEDLY SCLEROTIC HEMANGIOMA OR CHANGES SECONDARY TO INJURY                 12/1/2022 Surgery Laparoscopic diverting colostomy with liver biopsy  Dr J Luis Orantes       12/25/2022 Initial Diagnosis    Malignant neoplasm of sigmoid colon ([facility] [address] )     2022 - 1/10/2023 Chemotherapy    Oxaliplatin  [address]     2/24/2023 -  Cancer Staged    Staging form: Colon and Rectum, AJCC 8th Edition  - Clinical: Stage IIA (cT3, cN0, cM0) - Signed by David Graham MD on 2/24/2023  Total positive nodes: 0         History of Present Illness: 61-year-old female recently diagnosed with rectal adenocarcinoma recently moving into [place] and in need of a new medical oncologist   Patient has a complicated cancer history  Patient presents with her   Patient was diagnosed with cT3 cN0 cM0 rectal adenocarcinoma in the [facility]  Although the specifics are not clear, patient required a diverting ileostomy  Patient was then started on neoadjuvant CapeOx  Patient was able to get through the first cycle but then had a reaction on the second cycle  At that time, Mrs Truong Bell and her  decided to move to [address] [place] [address]  Patient was treated somewhat with the Xeloda only but has not been treated for 2 months  Patient also has a history of sarcoidosis, on steroids (being tapered)  Presently patient states feeling +/-  Patient has gained weight, face is swollen, abdomen is also swollen  Ileostomy is working well  No GI bleeding  No abdominal pain  Activities are extremely limited  No shortness of breath at rest, mild dyspnea on exertion  Pain is controlled  No fevers or signs of infection  Review of Systems   Constitutional: Positive for activity change, appetite change and fatigue  HENT: Negative  Eyes: Negative  Respiratory: Negative  Cardiovascular: Negative  Gastrointestinal: Negative  Endocrine: Negative  Genitourinary: Negative  Musculoskeletal: Negative  Skin: Negative  Allergic/Immunologic: Negative  Neurological: Negative  Hematological: Negative  Psychiatric/Behavioral: Negative  All other systems reviewed and are negative        Patient Active Problem List   Diagnosis   • Malignant neoplasm of sigmoid colon ([place] [address] )   • History of Clostridium difficile colitis   • Other hemochromatosis   • Primary hypertension   • Sarcoidosis   • Impaired fasting glucose   • Hypokalemia   • Transaminitis   • R flank pain with bilateral hydronephrosis   • Adrenal nodule (HCC)   • Bleeding from colostomy stoma [facility])   • Other specified anemias   • Thrombocytopenia (HCC)   • Nonrheumatic mitral valve regurgitation   • Advance directive discussed with patient   • Gross hematuria   • Skin lesion   • Abnormal CT of the chest   • Kidney calculi   • Dysuria   • Chest pain   • Low TSH level   • Healthcare maintenance     Past Medical History:   Diagnosis Date   • Cancer [facility])    • Colon cancer ([place] [address] )    • Fall    • Headache    • Hemochromatosis, unspecified    • History of transfusion     2022 - no adverse reaction   • Kidney calculi    • Kidney stone    • Liver disease     hemangioma   • Muscle weakness    • Sarcoidosis    • Seizures ([place] [address] )     2022     Past Surgical History:   Procedure Laterality Date   • ABSCESS DRAINAGE      left breast   • CHEST TUBE INSERTION     • COLON SURGERY      colostomy   • COLONOSCOPY     • FL RETROGRADE PYELOGRAM  1/23/2023   • LUNG BIOPSY     • NV CYSTO/URETERO W/LITHOTRIPSY &INDWELL STENT INSRT Bilateral 1/23/2023    Procedure: CYSTOSCOPY  RIGHT URETEROSCOPY WITH LITHOTRIPSY HOLMIUM LASER, BILATERAL RETROGRADE PYELOGRAM AND BILATERAL  URETERAL STENT INSERTION;  Surgeon: Caroline Oliver MD;  Location[facility];  Service: Urology   • TONSILLECTOMY     • URINARY SURGERY Bilateral     bilateral stents     Family History   Problem Relation Age of Onset   • Cancer Mother    • Cirrhosis Father      Social History     Socioeconomic History   • Marital status:      Spouse name: Not on file   • Number of children: Not on file   • Years of education: Not on file   • Highest education level: Not on file   Occupational History   • Not on file   Tobacco Use   • Smoking status: Never     Passive exposure: Past   • Smokeless tobacco: Never   Vaping Use   • Vaping Use: Never used   Substance and Sexual Activity   • Alcohol use: Never   • Drug use: Never   • Sexual activity: Not Currently   Other Topics Concern   • Not on file   Social History Narrative   • Not on file     Social Determinants of Health     Financial Resource Strain: Not on file   Food Insecurity: No Food Insecurity   • Worried About Running Out of Food in the Last Year: Never true   • Ran Out of Food in the Last Year: Never true   Transportation Needs: No Transportation Needs   • Lack of Transportation (Medical): No   • Lack of Transportation (Non-Medical):  No   Physical Activity: Not on file   Stress: Not on file   Social Connections: Not on file   Intimate Partner Violence: Not on file   Housing Stability: Low Risk    • Unable to Pay for Housing in the Last Year: No   • Number of Places Lived in the Last Year: 1   • Unstable Housing in the Last Year: No       Current Outpatient Medications:   •  acetaminophen (TYLENOL) 325 mg tablet, Take 650 mg by mouth every 6 (six) hours as needed for mild pain, Disp: , Rfl:   •  CAPECITABINE PO, Take 1,650 mg by mouth 2 (two) times a day, Disp: , Rfl:   •  cyanocobalamin (VITAMIN B-12) 1000 MCG tablet, Take 1,000 mcg by mouth daily, Disp: , Rfl:   •  phenazopyridine (PYRIDIUM) 100 mg tablet, Take 1 tablet (100 mg total) by mouth 3 (three) times a day with meals for 7 days, Disp: 21 tablet, Rfl: 0  •  potassium chloride (MICRO-K) 10 MEQ CR capsule, Take 4 capsules (40 mEq total) by mouth 2 (two) times a day for 7 days, Disp: 56 capsule, Rfl: 0  •  Potassium Gluconate 550 MG TABS, Take 1 tablet by mouth in the morning, Disp: , Rfl:   •  POTASSIUM PO, Take by mouth, Disp: , Rfl:   •  predniSONE 10 mg tablet, Take 2 tablets (20 mg total) by mouth daily, Disp: 120 tablet, Rfl: 0  •  amLODIPine (NORVASC) 5 mg tablet, Take 5 mg by mouth daily (Patient not taking: Reported on 3/13/2023), Disp: , Rfl:     Allergies   Allergen Reactions   • Oxaliplatin Shortness Of Breath     Reactions occurred with 2nd and 3rd infusions (about 1-3 hours from initiation of infusion) and required treatment with steroids and antihistamines  Please refer to allergy note on 2/7/2023 for detailed description of her reactions  Vitals:    03/16/23 1557   BP: 152/84   Pulse: (!) 123   Resp: 16   Temp: 97 7 °F (36 5 °C)   SpO2: 97%     Physical Exam  Constitutional:       Appearance: She is well-developed  HENT:      Head: Normocephalic and atraumatic  Right Ear: External ear normal       Left Ear: External ear normal    Eyes:      Conjunctiva/sclera: Conjunctivae normal       Pupils: Pupils are equal, round, and reactive to light  Cardiovascular:      Rate and Rhythm: Normal rate and regular rhythm  Heart sounds: Normal heart sounds  Pulmonary:      Effort: Pulmonary effort is normal       Breath sounds: Normal breath sounds  Comments: Clear bilaterally  Abdominal:      General: Bowel sounds are normal       Palpations: Abdomen is soft  Comments: + Bowel sounds, left upper quadrant ostomy in place, nontender, distended, no fluid, no rigidity or rebound   Musculoskeletal:         General: Normal range of motion  Cervical back: Normal range of motion and neck supple  Skin:     General: Skin is warm  Comments: Relatively good color, warm, moist, no petechiae or ecchymoses   Neurological:      Mental Status: She is alert and oriented to person, place, and time  Deep Tendon Reflexes: Reflexes are normal and symmetric  Psychiatric:         Behavior: Behavior normal          Thought Content:  Thought content normal          Judgment: Judgment normal      Extremities: No adenopathy in the neck, supraclavicular chain, axilla bilaterally  Lymphatics: no adenopathy in the neck, supraclavicular region, axilla and groin bilaterally    Performance Status: 1 - Symptomatic but completely ambulatory    Labs    3/14/2023 WBC = 5 84 hemoglobin = 11 4 hematocrit = 36 6 MCV = 100 platelet = 750    9/67/6962 potassium = 2 8 BUN = 14 creatinine = 0 82 glucose = 293 calcium = 9 0 AST = 49 ALT = 58 alkaline phosphatase = 366 total protein = 6 2 total bilirubin = 1 3    Imaging    2/11/2023 CAT scan abdomen pelvis with contrast    IMPRESSION:     Interval decrease in right-sided hydronephrosis      Thickening of the ascending colon and cecum which may represent colitis  Follow-up with gastroenterology      Stable 1 cm right adrenal gland nodule  Although its imaging features are indeterminate, it does not have suspicious imaging features (heterogeneity, necrosis, irregular margins), therefore this is likely benign, and can be followed by non-contrast abdomen CT or MRI in 12 months  If patient has history of adrenal hyperfunction, consider biochemical evaluation  Pathology    Case Report   Surgical Pathology Report                         Case: [id number]                                    Authorizing Provider: Robert Mccarty MD           Collected:           02/22/2023 0731               Ordering Location:     [address]      Received:            02/22/2023 0732                                      [facility]                                                                                   Laboratory                                                                    Pathologist:           Tammy Mullen MD                                                                  Specimen:    Colon, Rectosigmoid Colon Mass Biopsy (1 slide [id number], collected 10/28/2022)                                                              Final Diagnosis   A   Colon, Rectosigmoid Colon Mass Biopsy (1 slide [address] , collected 10/28/2022):  - Adenocarcinoma arising in a tubular adenoma      Note: Per outside report (slides were not available for review)       RESULTS OF IMMUNOHISTOCHEMICAL ANALYSIS FOR MISMATCH REPAIR PROTEIN LOSS     RESULTS:  Antibody            Clone                 Description                     Results  MLH1                 M1                     Mismatch repair protein  Intact nuclear expression  MSH2                [id number]        Mismatch repair protein  Intact nuclear expression  MSH6                SP93                  Mismatch repair protein  Intact nuclear expression  PMS2                 A16-4                 Mismatch repair protein  Intact nuclear expression      Electronically signed by Lyndsey Hilton MD on 2/22/2023 at 10:11 AM

## 2023-03-16 NOTE — H&P (VIEW-ONLY)
Luisa Latricia  1965  [address] [facility] [place]  [address] [facility] [address]  [place] [id number]   HEMATOLOGY/ONCOLOGY CONSULT    DISCUSSION/SUMMARY:    73-year-old female recently diagnosed with rectal cancer  Issues:    Rectal cancer  Clinical stage from another institution is cT3 cN0 cM0  Patient required upfront diverting procedure - reason not entirely clear (patient did not know), presumed pain, bleeding, obstruction  Mrs Rickey rAevalo apparently was able to tolerate the first cycle of CapeOX but developed a severe reaction on the second cycle  There are notes in Care Everywhere where the plan at [facility] was to desensitize the patient to oxaliplatin  Respectfully, I do not believe this is now necessary, wasted some time  Last Xeloda was 8 weeks ago  To be absolutely certain that there is no evidence of disease progression or metastatic disease, patient is to go for repeat CAT scan of the chest   Patient will also have a repeat MRI/pelvis staging  CEA level requested  NCCN guidelines 4 2022 state that for patients with T3, N any with clear CRM by MRI, neoadjuvant options include the standard chemotherapy for 3 to 4 months, long course chemo RT and then restaging with evaluation for resection  Another option includes upfront long course chemo RT with either capecitabine or fusion or 5-FU  I will speak to Dr Roger Sheffield but I think it best that patient proceed with long course chemo RT with 5-FU (as long as there is no evidence for disease progression/metastatic disease)  Standard dosing schedule with concurrent RT is 5- mg/m2 IV over 24 hours x 5 days for approximately 5 weeks (first time I met the patient today, not sure she could tolerate 7 days of continuous infusion 5-FU)  This was discussed with patient and , they are in agreement  Scan request placed; IR consulted for port placement  Sarcoidosis    This is a relatively new diagnosis  No significant respiratory issues at this time  Patient is on a prednisone taper  Patient is being followed by pulmonary  Concern for hemochromatosis (pulmonary note from March 7, 2023)  HFE gene mutation analysis requested  Nephrolithiasis, ureteral obstruction  Patient follows up with   Patient is to return in 3 weeks but this may change depending upon the above  Mrs  Gaudencio Womack knows to call the hematology/oncology office if there are any other questions or concerns  Carefully review your medication list and verify that the list is accurate and up-to-date  Please call the hematology/oncology office if there are medications missing from the list, medications on the list that you are not currently taking or if there is a dosage or instruction that is different from how you're taking that medication  Patient goals and areas of care: Clarify stage, clarify no evidence of disease progression, speak with radiation oncology  Barriers to care: none  Patient is able to self-care   _____________________________________________________________________________________    Chief Complaint   Patient presents with   • Rectal Cancer   • Consult     Oncology History   Malignant neoplasm of sigmoid colon ([facility] [address] )   10/28/2022 Biopsy    Colon, Rectosigmoid Colon Mass Biopsy (1 slide Deepak  98 , collected 10/28/2022):  - Adenocarcinoma arising in a tubular adenoma     12/1/2022 Biopsy    DIAGNOSIS LIVER, SEGMENT 3, RESECTION:  - HYALINIZED SUBCAPSULAR AND INTRA-PARENCHYMAL NODULES  NO MALIGNANCY SEEN  SEE NOTE  - MILD MACROVESICULAR STEATOSIS (25%)  NO EVIDENCE OF STEATOHEPATITIS  TRICHOME STAIN SHOWS MILD PORTAL FIBROSIS    - 2+ IRON DEPOSITION WITHIN KUPFFER CELLS (IRON STAIN), CONSISTENT WITH SECONDARY HEMOCHROMATOSIS  NOTE: THIS LIKELY REPRESENTS A MARKEDLY SCLEROTIC HEMANGIOMA OR CHANGES SECONDARY TO INJURY                 12/1/2022 Surgery Laparoscopic diverting colostomy with liver biopsy  Dr Sarika Dubon       12/25/2022 Initial Diagnosis    Malignant neoplasm of sigmoid colon ([facility] [address] )     2022 - 1/10/2023 Chemotherapy    Oxaliplatin  [address]     2/24/2023 -  Cancer Staged    Staging form: Colon and Rectum, AJCC 8th Edition  - Clinical: Stage IIA (cT3, cN0, cM0) - Signed by Angelita Avalos MD on 2/24/2023  Total positive nodes: 0         History of Present Illness: 56-year-old female recently diagnosed with rectal adenocarcinoma recently moving into [facility] and in need of a new medical oncologist   Patient has a complicated cancer history  Patient presents with her   Patient was diagnosed with cT3 cN0 cM0 rectal adenocarcinoma in the [facility]  Although the specifics are not clear, patient required a diverting ileostomy  Patient was then started on neoadjuvant CapeOx  Patient was able to get through the first cycle but then had a reaction on the second cycle  At that time, Mrs Yane Gar and her  decided to move to [place]  Patient was treated somewhat with the Xeloda only but has not been treated for 2 months  Patient also has a history of sarcoidosis, on steroids (being tapered)  Presently patient states feeling +/-  Patient has gained weight, face is swollen, abdomen is also swollen  Ileostomy is working well  No GI bleeding  No abdominal pain  Activities are extremely limited  No shortness of breath at rest, mild dyspnea on exertion  Pain is controlled  No fevers or signs of infection  Review of Systems   Constitutional: Positive for activity change, appetite change and fatigue  HENT: Negative  Eyes: Negative  Respiratory: Negative  Cardiovascular: Negative  Gastrointestinal: Negative  Endocrine: Negative  Genitourinary: Negative  Musculoskeletal: Negative  Skin: Negative  Allergic/Immunologic: Negative  Neurological: Negative  Hematological: Negative  Psychiatric/Behavioral: Negative  All other systems reviewed and are negative        Patient Active Problem List   Diagnosis   • Malignant neoplasm of sigmoid colon ([place] [address] )   • History of Clostridium difficile colitis   • Other hemochromatosis   • Primary hypertension   • Sarcoidosis   • Impaired fasting glucose   • Hypokalemia   • Transaminitis   • R flank pain with bilateral hydronephrosis   • Adrenal nodule (HCC)   • Bleeding from colostomy stoma [facility])   • Other specified anemias   • Thrombocytopenia (HCC)   • Nonrheumatic mitral valve regurgitation   • Advance directive discussed with patient   • Gross hematuria   • Skin lesion   • Abnormal CT of the chest   • Kidney calculi   • Dysuria   • Chest pain   • Low TSH level   • Healthcare maintenance     Past Medical History:   Diagnosis Date   • Cancer [facility])    • Colon cancer ([place] [address] )    • Fall    • Headache    • Hemochromatosis, unspecified    • History of transfusion     2022 - no adverse reaction   • Kidney calculi    • Kidney stone    • Liver disease     hemangioma   • Muscle weakness    • Sarcoidosis    • Seizures ([place] [address] )     2022     Past Surgical History:   Procedure Laterality Date   • ABSCESS DRAINAGE      left breast   • CHEST TUBE INSERTION     • COLON SURGERY      colostomy   • COLONOSCOPY     • FL RETROGRADE PYELOGRAM  1/23/2023   • LUNG BIOPSY     • KY CYSTO/URETERO W/LITHOTRIPSY &INDWELL STENT INSRT Bilateral 1/23/2023    Procedure: CYSTOSCOPY  RIGHT URETEROSCOPY WITH LITHOTRIPSY HOLMIUM LASER, BILATERAL RETROGRADE PYELOGRAM AND BILATERAL  URETERAL STENT INSERTION;  Surgeon: Jonny Faye MD;  Location: [facility];  Service: Urology   • TONSILLECTOMY     • URINARY SURGERY Bilateral     bilateral stents     Family History   Problem Relation Age of Onset   • Cancer Mother    • Cirrhosis Father      Social History     Socioeconomic History   • Marital status:      Spouse name: Not on file   • Number of children: Not on file   • Years of education: Not on file   • Highest education level: Not on file   Occupational History   • Not on file   Tobacco Use   • Smoking status: Never     Passive exposure: Past   • Smokeless tobacco: Never   Vaping Use   • Vaping Use: Never used   Substance and Sexual Activity   • Alcohol use: Never   • Drug use: Never   • Sexual activity: Not Currently   Other Topics Concern   • Not on file   Social History Narrative   • Not on file     Social Determinants of Health     Financial Resource Strain: Not on file   Food Insecurity: No Food Insecurity   • Worried About Running Out of Food in the Last Year: Never true   • Ran Out of Food in the Last Year: Never true   Transportation Needs: No Transportation Needs   • Lack of Transportation (Medical): No   • Lack of Transportation (Non-Medical):  No   Physical Activity: Not on file   Stress: Not on file   Social Connections: Not on file   Intimate Partner Violence: Not on file   Housing Stability: Low Risk    • Unable to Pay for Housing in the Last Year: No   • Number of Places Lived in the Last Year: 1   • Unstable Housing in the Last Year: No       Current Outpatient Medications:   •  acetaminophen (TYLENOL) 325 mg tablet, Take 650 mg by mouth every 6 (six) hours as needed for mild pain, Disp: , Rfl:   •  CAPECITABINE PO, Take 1,650 mg by mouth 2 (two) times a day, Disp: , Rfl:   •  cyanocobalamin (VITAMIN B-12) 1000 MCG tablet, Take 1,000 mcg by mouth daily, Disp: , Rfl:   •  phenazopyridine (PYRIDIUM) 100 mg tablet, Take 1 tablet (100 mg total) by mouth 3 (three) times a day with meals for 7 days, Disp: 21 tablet, Rfl: 0  •  potassium chloride (MICRO-K) 10 MEQ CR capsule, Take 4 capsules (40 mEq total) by mouth 2 (two) times a day for 7 days, Disp: 56 capsule, Rfl: 0  •  Potassium Gluconate 550 MG TABS, Take 1 tablet by mouth in the morning, Disp: , Rfl:   •  POTASSIUM PO, Take by mouth, Disp: , Rfl:   •  predniSONE 10 mg tablet, Take 2 tablets (20 mg total) by mouth daily, Disp: 120 tablet, Rfl: 0  •  amLODIPine (NORVASC) 5 mg tablet, Take 5 mg by mouth daily (Patient not taking: Reported on 3/13/2023), Disp: , Rfl:     Allergies   Allergen Reactions   • Oxaliplatin Shortness Of Breath     Reactions occurred with 2nd and 3rd infusions (about 1-3 hours from initiation of infusion) and required treatment with steroids and antihistamines  Please refer to allergy note on 2/7/2023 for detailed description of her reactions  Vitals:    03/16/23 1557   BP: 152/84   Pulse: (!) 123   Resp: 16   Temp: 97 7 °F (36 5 °C)   SpO2: 97%     Physical Exam  Constitutional:       Appearance: She is well-developed  HENT:      Head: Normocephalic and atraumatic  Right Ear: External ear normal       Left Ear: External ear normal    Eyes:      Conjunctiva/sclera: Conjunctivae normal       Pupils: Pupils are equal, round, and reactive to light  Cardiovascular:      Rate and Rhythm: Normal rate and regular rhythm  Heart sounds: Normal heart sounds  Pulmonary:      Effort: Pulmonary effort is normal       Breath sounds: Normal breath sounds  Comments: Clear bilaterally  Abdominal:      General: Bowel sounds are normal       Palpations: Abdomen is soft  Comments: + Bowel sounds, left upper quadrant ostomy in place, nontender, distended, no fluid, no rigidity or rebound   Musculoskeletal:         General: Normal range of motion  Cervical back: Normal range of motion and neck supple  Skin:     General: Skin is warm  Comments: Relatively good color, warm, moist, no petechiae or ecchymoses   Neurological:      Mental Status: She is alert and oriented to person, place, and time  Deep Tendon Reflexes: Reflexes are normal and symmetric  Psychiatric:         Behavior: Behavior normal          Thought Content:  Thought content normal          Judgment: Judgment normal      Extremities: No adenopathy in the neck, supraclavicular chain, axilla bilaterally  Lymphatics: no adenopathy in the neck, supraclavicular region, axilla and groin bilaterally    Performance Status: 1 - Symptomatic but completely ambulatory    Labs    3/14/2023 WBC = 5 84 hemoglobin = 11 4 hematocrit = 36 6 MCV = 100 platelet = 716    2/93/5583 potassium = 2 8 BUN = 14 creatinine = 0 82 glucose = 293 calcium = 9 0 AST = 49 ALT = 58 alkaline phosphatase = 366 total protein = 6 2 total bilirubin = 1 3    Imaging    2/11/2023 CAT scan abdomen pelvis with contrast    IMPRESSION:     Interval decrease in right-sided hydronephrosis      Thickening of the ascending colon and cecum which may represent colitis  Follow-up with gastroenterology      Stable 1 cm right adrenal gland nodule  Although its imaging features are indeterminate, it does not have suspicious imaging features (heterogeneity, necrosis, irregular margins), therefore this is likely benign, and can be followed by non-contrast abdomen CT or MRI in 12 months  If patient has history of adrenal hyperfunction, consider biochemical evaluation  Pathology    Case Report   Surgical Pathology Report                         Case: [id number]                                    Authorizing Provider: Lala Fagan MD           Collected:           02/22/2023 0731               Ordering Location:     [address]      Received:            02/22/2023 0732                                      [facility]                                                                                   Laboratory                                                                    Pathologist:           Dayan Olivo MD                                                                  Specimen:    Colon, Rectosigmoid Colon Mass Biopsy (1 slide [id number] [address] [facility], collected 10/28/2022)                                                              Final Diagnosis   A   Colon, Rectosigmoid Colon Mass Biopsy (1 slide [address] , collected 10/28/2022):  - Adenocarcinoma arising in a tubular adenoma      Note: Per outside report (slides were not available for review)       RESULTS OF IMMUNOHISTOCHEMICAL ANALYSIS FOR MISMATCH REPAIR PROTEIN LOSS     RESULTS:  Antibody            Clone                 Description                     Results  MLH1                 M1                     Mismatch repair protein  Intact nuclear expression  MSH2                [id number]        Mismatch repair protein  Intact nuclear expression  MSH6                SP93                  Mismatch repair protein  Intact nuclear expression  PMS2                 A16-4                 Mismatch repair protein  Intact nuclear expression      Electronically signed by Luis Garcia MD on 2/22/2023 at 10:11 AM

## 2023-03-17 ENCOUNTER — TELEPHONE (OUTPATIENT)
Dept: NEPHROLOGY | Facility: CLINIC | Age: 58
End: 2023-03-17

## 2023-03-17 ENCOUNTER — HOSPITAL ENCOUNTER (OUTPATIENT)
Dept: RADIOLOGY | Facility: HOSPITAL | Age: 58
Discharge: HOME | End: 2023-03-17
Attending: INTERNAL MEDICINE

## 2023-03-17 ENCOUNTER — OFFICE VISIT (OUTPATIENT)
Dept: UROLOGY | Facility: CLINIC | Age: 58
End: 2023-03-17

## 2023-03-17 VITALS
OXYGEN SATURATION: 92 % | BODY MASS INDEX: 26.68 KG/M2 | SYSTOLIC BLOOD PRESSURE: 144 MMHG | HEART RATE: 76 BPM | DIASTOLIC BLOOD PRESSURE: 90 MMHG | HEIGHT: 62 IN | WEIGHT: 145 LBS

## 2023-03-17 DIAGNOSIS — N20.0 KIDNEY CALCULI: Primary | ICD-10-CM

## 2023-03-17 DIAGNOSIS — C18.7 MALIGNANT NEOPLASM OF SIGMOID COLON (HCC): ICD-10-CM

## 2023-03-17 DIAGNOSIS — R31.0 GROSS HEMATURIA: ICD-10-CM

## 2023-03-17 NOTE — TELEPHONE ENCOUNTER
New Patient Intake Form   Patient Details   Kirit Space     1965     [id number]     Insurance Information   Name of [facility]   Does the patient need an insurance referral? no   If patient has Pitma[employer]en Irving, please ask if they will be using their Pitmaureen Irving  Appointment Information   Who is calling to schedule? If not patient, what is callers name? Patient ex    Referring Provider Dr Fátima Watts     Reason for Appt (Diagnosis) Hypokalemia   Dysuria   Gross hematuria   Does Patient have labs/urine done at [place] [address]? If not, where do they go? List the date of last lab / urine yes   Has patient been hospitalized recently? If yes, list name and location of hospital they were in yes   Has patient been seen by a Nephrologist before? If yes, list name, location and phone number  3/13/23 yes  [address] [place] [address]     Has the patient had renal imaging done? If so, list the most recent date and type of imaging yes 2/11/23 Cat scan     Does patient have a history of Kidney Stones? yes   Appointment Details   Is there a referral on file?  yes    Appointment Date 5/24/23   Location  [facility]   Miscellaneous

## 2023-03-17 NOTE — PRE-PROCEDURE INSTRUCTIONS
Pre-Surgery Instructions:   Medication Instructions   • acetaminophen (TYLENOL) 325 mg tablet Uses PRN- OK to take day of surgery   • cyanocobalamin (VITAMIN B-12) 1000 MCG tablet hold until after sx   • phenazopyridine (PYRIDIUM) 100 mg tablet Take day of surgery  • potassium chloride (MICRO-K) 10 MEQ CR capsule Hold day of surgery  • predniSONE 10 mg tablet Take day of surgery  Medication instructions for day surgery reviewed  Please use only a sip of water to take your instructed medications  Avoid all aspirin containing products and over the counter vitamins, supplements and NSAIDS for one week prior to surgery per anesthesia guidelines  Tylenol is ok to take as needed  You will receive a call one business day prior to surgery with an arrival time and hospital directions  If your surgery is scheduled on a Monday, the hospital will be calling you on the Friday prior to your surgery  If you have not heard from anyone by 8pm, please call the hospital supervisor through the hospital  at [phone]  Do not eat or drink anything after midnight the night before your surgery, including candy, mints, lifesavers, or chewing gum  Do not drink alcohol 24hrs before your surgery  Try not to smoke at least 24hrs before your surgery  Follow the pre surgery showering instructions as listed in the [facility] Surgical Experience Booklet” or otherwise provided by your surgeon's office  Do not shave the surgical area 24 hours before surgery  Do not apply any lotions, creams, including makeup, cologne, deodorant, or perfumes after showering on the day of your surgery  No contact lenses, eye make-up, or artificial eyelashes  Remove nail polish, including gel polish, and any artificial, gel, or acrylic nails if possible  Remove all jewelry including rings and body piercing jewelry  Wear causal clothing that is easy to take on and off  Consider your type of surgery      Keep any valuables, jewelry, piercings at home  Please bring any specially ordered equipment (sling, braces) if indicated  Arrange for a responsible person to drive you to and from the hospital on the day of your surgery  Visitor Guidelines discussed  Call the surgeon's office with any new illnesses, exposures, or additional questions prior to surgery  Please reference your [facility] Surgical Experience Booklet” for additional information to prepare for your upcoming surgery

## 2023-03-17 NOTE — PROGRESS NOTES
1  Kidney calculi  Urine culture    CANCELED: POCT urine dip      2  Gross hematuria  Urine culture    CANCELED: POCT urine dip          Assessment and plan:       1  Bilateral ureteral stents  -Retained ureteral stents  -Status post cystoscopy, right ureteroscopy, holmium laser, retrograde pyelogram, and bilateral stent exchange 1/23/23 with Dr Giovanna Flores  -Scheduled for cystoscopy, left ureteroscopy, holmium laser, retrograde pyelogram, and ureteral exchange 4/3/2023 with Dr Giovanna Flores  -Risks of the procedure reviewed including but not limited to cardiopulmonary complication, VTE, bleeding, infection, ureteral stricture, and need for additional procedures   -Consent signed today  -Urine will be submitted for culture, she will be contacted with antibiotics as needed      Paige Bird PA-C      Chief Complaint     Retained ureteral stent  History of Present Illness     Francine Ruiz is a 56 y o  female presenting today for history and physical     S/p cystoscopy, b/l retrogrades, right ureteroscopy and lsater lithotripsy, bilateral ureteral stent exchanges 1/23/23 with Dr Konrad Gibbs:  This was a markedly complex endoscopic case  The right ureteral stone was highly impacted  The stone had completely worn through the proximal ureter essentially circumferentially down to the submucosal tissue  I performed an extensive and exquisitely careful ureteroscopy using both a semirigid as well as a flexible digital disposable endoscope  I kept laser settings low to minimize the risk of thermal injury to the ureter which was clearly damaged due to the size and location of the stone  A very small mount of extravasation was noted at the conclusion of the case  A fresh ureteral stent was left in place without an external string  I elected to exchange the left ureteral stent and to plan for second stage procedure as planned to manage the stone on that side      Scheduled for cystoscopy, left ureteroscopy, holmium laser, retrograde pyelogram, and left ureteral stent insertion 4/3/23 with Dr Augusto Conte in the [place] [address]  Medical comorbidities include sigmoid cancer s/p partial colectomy and chemotherapy, sarcoidosis, mitral valve regurgitation, thrombocytopenia, hx cdiff,       Laboratory     Lab Results   Component Value Date    CREATININE 0 70 03/14/2023       Review of Systems     Review of Systems   Constitutional: Negative for activity change, appetite change, chills, diaphoresis, fatigue, fever and unexpected weight change  Respiratory: Negative for chest tightness and shortness of breath  Cardiovascular: Negative for chest pain, palpitations and leg swelling  Gastrointestinal: Negative for abdominal distention, abdominal pain, constipation, diarrhea, nausea and vomiting  Genitourinary: Positive for dysuria  Negative for decreased urine volume, difficulty urinating, enuresis, flank pain, frequency, genital sores, hematuria and urgency  Musculoskeletal: Negative for back pain, gait problem and myalgias  Skin: Negative for color change, pallor, rash and wound  Psychiatric/Behavioral: Negative for behavioral problems  The patient is not nervous/anxious  Allergies     Allergies   Allergen Reactions   • Oxaliplatin Shortness Of Breath     Reactions occurred with 2nd and 3rd infusions (about 1-3 hours from initiation of infusion) and required treatment with steroids and antihistamines  Please refer to allergy note on 2/7/2023 for detailed description of her reactions  Physical Exam     Physical Exam  Constitutional:       General: She is not in acute distress  Appearance: Normal appearance  She is normal weight  She is not ill-appearing, toxic-appearing or diaphoretic  HENT:      Head: Normocephalic and atraumatic  Eyes:      General:         Right eye: No discharge  Left eye: No discharge        Conjunctiva/sclera: Conjunctivae normal    Cardiovascular: Rate and Rhythm: Normal rate and regular rhythm  Heart sounds: Normal heart sounds  No murmur heard  No friction rub  Pulmonary:      Effort: Pulmonary effort is normal  No respiratory distress  Breath sounds: Normal breath sounds  No stridor  Musculoskeletal:         General: No swelling or tenderness  Normal range of motion  Skin:     General: Skin is warm and dry  Coloration: Skin is not jaundiced or pale  Neurological:      General: No focal deficit present  Mental Status: She is alert and oriented to person, place, and time  Psychiatric:         Mood and Affect: Mood normal          Behavior: Behavior normal          Thought Content:  Thought content normal            Vital Signs     Vitals:    03/17/23 0926   BP: 144/90   Pulse: 76   SpO2: 92%   Weight: 65 8 kg (145 lb)   Height: 5' 2" (1 575 m)         Current Medications       Current Outpatient Medications:   •  acetaminophen (TYLENOL) 325 mg tablet, Take 650 mg by mouth every 6 (six) hours as needed for mild pain, Disp: , Rfl:   •  cyanocobalamin (VITAMIN B-12) 1000 MCG tablet, Take 1,000 mcg by mouth daily, Disp: , Rfl:   •  phenazopyridine (PYRIDIUM) 100 mg tablet, Take 1 tablet (100 mg total) by mouth 3 (three) times a day with meals for 7 days, Disp: 21 tablet, Rfl: 0  •  potassium chloride (MICRO-K) 10 MEQ CR capsule, Take 4 capsules (40 mEq total) by mouth 2 (two) times a day for 7 days, Disp: 56 capsule, Rfl: 0  •  Potassium Gluconate 550 MG TABS, Take 1 tablet by mouth in the morning, Disp: , Rfl:   •  POTASSIUM PO, Take by mouth, Disp: , Rfl:   •  predniSONE 10 mg tablet, Take 2 tablets (20 mg total) by mouth daily, Disp: 120 tablet, Rfl: 0  •  amLODIPine (NORVASC) 5 mg tablet, Take 5 mg by mouth daily (Patient not taking: Reported on 3/13/2023), Disp: , Rfl:   •  CAPECITABINE PO, Take 1,650 mg by mouth 2 (two) times a day (Patient not taking: Reported on 3/17/2023), Disp: , Rfl:       Active Problems     Patient Active Problem List   Diagnosis   • Malignant neoplasm of sigmoid colon ([place] [address] )   • History of Clostridium difficile colitis   • Other hemochromatosis   • Primary hypertension   • Sarcoidosis   • Impaired fasting glucose   • Hypokalemia   • Transaminitis   • R flank pain with bilateral hydronephrosis   • Adrenal nodule (HCC)   • Bleeding from colostomy stoma [facility])   • Other specified anemias   • Thrombocytopenia (HCC)   • Nonrheumatic mitral valve regurgitation   • Advance directive discussed with patient   • Gross hematuria   • Skin lesion   • Abnormal CT of the chest   • Kidney calculi   • Dysuria   • Chest pain   • Low TSH level   • Healthcare maintenance         Past Medical History     Past Medical History:   Diagnosis Date   • Cancer [facility])    • Colon cancer ([place] [address] )    • Fall    • Headache    • Hemochromatosis, unspecified    • History of transfusion     2022 - no adverse reaction   • Kidney calculi    • Kidney stone    • Liver disease     hemangioma   • Muscle weakness    • Sarcoidosis    • Seizures ([place] [address] )     2022         Surgical History     Past Surgical History:   Procedure Laterality Date   • ABSCESS DRAINAGE      left breast   • CHEST TUBE INSERTION     • COLON SURGERY      colostomy   • COLONOSCOPY     • FL RETROGRADE PYELOGRAM  1/23/2023   • LUNG BIOPSY     • W[place] CYSTO/URETERO W/LITHOTRIPSY &INDWELL STENT INSRT Bilateral 1/23/2023    Procedure: CYSTOSCOPY  RIGHT URETEROSCOPY WITH LITHOTRIPSY HOLMIUM LASER, BILATERAL RETROGRADE PYELOGRAM AND BILATERAL  URETERAL STENT INSERTION;  Surgeon: Evens Garay MD;  Location: [facility];  Service: Urology   • TONSILLECTOMY     • URINARY SURGERY Bilateral     bilateral stents         Family History     Family History   Problem Relation Age of Onset   • Cancer Mother    • Cirrhosis Father          Social History     Social History       Radiology

## 2023-03-18 ENCOUNTER — HOSPITAL ENCOUNTER (OUTPATIENT)
Dept: RADIOLOGY | Facility: HOSPITAL | Age: 58
Discharge: HOME | End: 2023-03-18
Attending: INTERNAL MEDICINE

## 2023-03-18 DIAGNOSIS — C18.7 MALIGNANT NEOPLASM OF SIGMOID COLON (HCC): ICD-10-CM

## 2023-03-18 LAB — BACTERIA UR CULT: NORMAL

## 2023-03-18 RX ADMIN — IOHEXOL 100 ML: 350 INJECTION, SOLUTION INTRAVENOUS at 11:49

## 2023-03-19 LAB
ATRIAL RATE: 85 BPM
P AXIS: 60 DEGREES
PR INTERVAL: 170 MS
QRS AXIS: 14 DEGREES
QRSD INTERVAL: 80 MS
QT INTERVAL: 380 MS
QTC INTERVAL: 452 MS
T WAVE AXIS: 38 DEGREES
VENTRICULAR RATE: 85 BPM

## 2023-03-21 ENCOUNTER — APPOINTMENT (OUTPATIENT)
Dept: RADIOLOGY | Facility: AMBULARY SURGERY CENTER | Age: 58
End: 2023-03-21

## 2023-03-21 ENCOUNTER — TELEPHONE (OUTPATIENT)
Dept: HEMATOLOGY ONCOLOGY | Facility: CLINIC | Age: 58
End: 2023-03-21

## 2023-03-21 ENCOUNTER — ANESTHESIA (OUTPATIENT)
Dept: PERIOP | Facility: AMBULARY SURGERY CENTER | Age: 58
End: 2023-03-21

## 2023-03-21 ENCOUNTER — PATIENT OUTREACH (OUTPATIENT)
Dept: CASE MANAGEMENT | Facility: HOSPITAL | Age: 58
End: 2023-03-21

## 2023-03-21 ENCOUNTER — ANESTHESIA EVENT (OUTPATIENT)
Dept: PERIOP | Facility: AMBULARY SURGERY CENTER | Age: 58
End: 2023-03-21

## 2023-03-21 ENCOUNTER — HOSPITAL ENCOUNTER (OUTPATIENT)
Facility: AMBULARY SURGERY CENTER | Age: 58
Setting detail: OUTPATIENT SURGERY
Discharge: HOME | End: 2023-03-21
Attending: RADIOLOGY | Admitting: RADIOLOGY

## 2023-03-21 VITALS
WEIGHT: 145 LBS | HEART RATE: 97 BPM | SYSTOLIC BLOOD PRESSURE: 153 MMHG | DIASTOLIC BLOOD PRESSURE: 68 MMHG | TEMPERATURE: 97.3 F | BODY MASS INDEX: 26.68 KG/M2 | RESPIRATION RATE: 16 BRPM | HEIGHT: 62 IN | OXYGEN SATURATION: 99 %

## 2023-03-21 DIAGNOSIS — C18.7 MALIGNANT NEOPLASM OF SIGMOID COLON (HCC): Primary | ICD-10-CM

## 2023-03-21 DEVICE — PORT DIGINTY 8FR MICRO-STICK KIT HEMODIAL MID SIZE: Type: IMPLANTABLE DEVICE | Site: CHEST | Status: FUNCTIONAL

## 2023-03-21 RX ORDER — FENTANYL CITRATE 50 UG/ML
INJECTION, SOLUTION INTRAMUSCULAR; INTRAVENOUS AS NEEDED
Status: DISCONTINUED | OUTPATIENT
Start: 2023-03-21 | End: 2023-03-21

## 2023-03-21 RX ORDER — SODIUM CHLORIDE, SODIUM LACTATE, POTASSIUM CHLORIDE, CALCIUM CHLORIDE 600; 310; 30; 20 MG/100ML; MG/100ML; MG/100ML; MG/100ML
INJECTION, SOLUTION INTRAVENOUS CONTINUOUS PRN
Status: DISCONTINUED | OUTPATIENT
Start: 2023-03-21 | End: 2023-03-21

## 2023-03-21 RX ORDER — CEFAZOLIN SODIUM 1 G/50ML
SOLUTION INTRAVENOUS AS NEEDED
Status: DISCONTINUED | OUTPATIENT
Start: 2023-03-21 | End: 2023-03-21

## 2023-03-21 RX ORDER — MIDAZOLAM HYDROCHLORIDE 2 MG/2ML
INJECTION, SOLUTION INTRAMUSCULAR; INTRAVENOUS AS NEEDED
Status: DISCONTINUED | OUTPATIENT
Start: 2023-03-21 | End: 2023-03-21

## 2023-03-21 RX ORDER — LIDOCAINE HYDROCHLORIDE AND EPINEPHRINE 10; 10 MG/ML; UG/ML
INJECTION, SOLUTION INFILTRATION; PERINEURAL AS NEEDED
Status: DISCONTINUED | OUTPATIENT
Start: 2023-03-21 | End: 2023-03-21 | Stop reason: HOSPADM

## 2023-03-21 RX ORDER — HYDROMORPHONE HCL/PF 1 MG/ML
0.2 SYRINGE (ML) INJECTION
Status: DISCONTINUED | OUTPATIENT
Start: 2023-03-21 | End: 2023-03-21 | Stop reason: HOSPADM

## 2023-03-21 RX ORDER — CEFAZOLIN SODIUM 1 G/50ML
1000 SOLUTION INTRAVENOUS ONCE
Status: COMPLETED | OUTPATIENT
Start: 2023-03-21 | End: 2023-03-21

## 2023-03-21 RX ORDER — ONDANSETRON 2 MG/ML
INJECTION INTRAMUSCULAR; INTRAVENOUS AS NEEDED
Status: DISCONTINUED | OUTPATIENT
Start: 2023-03-21 | End: 2023-03-21

## 2023-03-21 RX ORDER — ONDANSETRON 2 MG/ML
4 INJECTION INTRAMUSCULAR; INTRAVENOUS ONCE AS NEEDED
Status: DISCONTINUED | OUTPATIENT
Start: 2023-03-21 | End: 2023-03-21 | Stop reason: HOSPADM

## 2023-03-21 RX ORDER — PROPOFOL 10 MG/ML
INJECTION, EMULSION INTRAVENOUS AS NEEDED
Status: DISCONTINUED | OUTPATIENT
Start: 2023-03-21 | End: 2023-03-21

## 2023-03-21 RX ORDER — SODIUM CHLORIDE, SODIUM LACTATE, POTASSIUM CHLORIDE, CALCIUM CHLORIDE 600; 310; 30; 20 MG/100ML; MG/100ML; MG/100ML; MG/100ML
50 INJECTION, SOLUTION INTRAVENOUS CONTINUOUS
Status: DISCONTINUED | OUTPATIENT
Start: 2023-03-21 | End: 2023-03-21 | Stop reason: HOSPADM

## 2023-03-21 RX ADMIN — FENTANYL CITRATE 100 MCG: 50 INJECTION INTRAMUSCULAR; INTRAVENOUS at 10:11

## 2023-03-21 RX ADMIN — SODIUM CHLORIDE, SODIUM LACTATE, POTASSIUM CHLORIDE, AND CALCIUM CHLORIDE: .6; .31; .03; .02 INJECTION, SOLUTION INTRAVENOUS at 09:32

## 2023-03-21 RX ADMIN — ONDANSETRON 4 MG: 2 INJECTION INTRAMUSCULAR; INTRAVENOUS at 10:26

## 2023-03-21 RX ADMIN — CEFAZOLIN SODIUM 1000 MG: 1 SOLUTION INTRAVENOUS at 09:48

## 2023-03-21 RX ADMIN — PROPOFOL 50 MG: 10 INJECTION, EMULSION INTRAVENOUS at 10:13

## 2023-03-21 RX ADMIN — MIDAZOLAM 2 MG: 1 INJECTION INTRAMUSCULAR; INTRAVENOUS at 10:08

## 2023-03-21 RX ADMIN — CEFAZOLIN SODIUM 1000 MG: 1 SOLUTION INTRAVENOUS at 10:08

## 2023-03-21 NOTE — ANESTHESIA PREPROCEDURE EVALUATION
Procedure:  INSERTION VENOUS PORT ( PORT-A-CATH) IR (Chest)    Relevant Problems   CARDIO   (+) Chest pain   (+) Nonrheumatic mitral valve regurgitation   (+) Primary hypertension      GI/HEPATIC   (+) Malignant neoplasm of sigmoid colon (HCC)      /RENAL   (+) Kidney calculi   (+) R flank pain with bilateral hydronephrosis      HEMATOLOGY   (+) Other specified anemias   (+) Thrombocytopenia (HCC)      NEURO/PSYCH   (+) History of Clostridium difficile colitis      Musculoskeletal and Integument   (+) Skin lesion      Genitourinary   (+) Gross hematuria      Other   (+) Abnormal CT of the chest   (+) Adrenal nodule (HCC)   (+) Advance directive discussed with patient   (+) Bleeding from colostomy stoma (HCC)   (+) Dysuria   (+) Healthcare maintenance   (+) Hypokalemia   (+) Low TSH level   (+) Other hemochromatosis   (+) Sarcoidosis   (+) Transaminitis        Physical Exam    Airway    Mallampati score: II  TM Distance: >3 FB  Neck ROM: full     Dental   No notable dental hx     Cardiovascular  Cardiovascular exam normal    Pulmonary  Pulmonary exam normal     Other Findings        Anesthesia Plan  ASA Score- 2     Anesthesia Type- IV sedation with anesthesia with ASA Monitors  Additional Monitors:   Airway Plan:           Plan Factors-Exercise tolerance (METS): >4 METS  Chart reviewed  Existing labs reviewed  Patient is not a current smoker  Patient not instructed to abstain from smoking on day of procedure  Patient did not smoke on day of surgery  Induction- intravenous  Postoperative Plan-     Informed Consent- Anesthetic plan and risks discussed with patient  I personally reviewed this patient with the CRNA  Discussed and agreed on the Anesthesia Plan with the CRNA  Lloyd Caraballo

## 2023-03-21 NOTE — ANESTHESIA POSTPROCEDURE EVALUATION
Post-Op Assessment Note    CV Status:  Stable  Pain Score: 0    Pain management: adequate     Mental Status:  Alert and awake   Hydration Status:  Euvolemic   PONV Controlled:  Controlled   Airway Patency:  Patent      Post Op Vitals Reviewed: Yes      Staff: CRNA, Anesthesiologist         No notable events documented      BP   117/67   Temp  97 8   Pulse  77   Resp   16   SpO2   99

## 2023-03-21 NOTE — INTERVAL H&P NOTE
Patient arrived to [facility] for port placement    The procedure and risks were discussed with the patient  All questions were answered  Informed consent was obtained  H & P reviewed after examining the patient and I find no changes in the patient condition since the H & P has been written  /78   Pulse 102   Temp (!) 97 °F (36 1 °C) (Temporal)   Resp 18   Ht 5' 2" (1 575 m)   Wt 65 8 kg (145 lb)   SpO2 98%   BMI 26 52 kg/m²     Patient re-evaluated   Accept as history and physical     Markos Olivia, DO/March 21, 2023/9:45 AM

## 2023-03-21 NOTE — PROGRESS NOTES
Per chart review patient is in IR for chest port placement  MSW attempt to call patient at scheduled date  MSW will continue to follow

## 2023-03-21 NOTE — OP NOTE
Chest port placement 3/21/23     History:      Rectal carcinoma     Contrast: none     Procedure: The patient was identified verbally and by wristband  Timeout was performed  Informed consent was obtained  All elements of maximal sterile barrier technique were followed (cap, mask, sterile gown, sterile gloves, large sterile sheet, hand hygiene and 2% chlorhexidine for cutaneous antisepsis)  Following obtaining informed consent, the patient was prepped and draped in the usual sterile fashion  Using ultrasound guidance, access was gained to the patient's right nternal jugular vein using a micropuncture system  The micropuncture wire was removed and a  035 wire was advanced to the level of the inferior vena cava using fluoroscopic guidance  Using 1% lidocaine, the region of the right anterior chest was was anesthetized  A small incision was made using a 15 blade scalpel  The port pocket was created using blunt dissection  The catheter tubing was tunneled from the incision to the venotomy  The micropuncture dilator was exchanged for a peel-away sheath, using fluoroscopic guidance  The catheter was place through the peel-away sheath  The catheter was measured and cut to size  The catheter was attached to the port  The port was flushed with saline to ensure patency without evidence of leakage  The port was placed in the port pocket  The port was sutured in the pocket using 3-0 Vicryl  The incisions were approximated with 3-0 Vicryl and tissue adhesive  The patient tolerated the procedure well without apparent immediate complications  The patient left the IR department in unchanged condition  Dr Anurag Gerardo performed and directly supervised the entire procedure  Findings:      Using ultrasound guidance, the right internal jugular vein was cannulated using Seldinger technique  The right internal jugular vein was evaluated as a potential access site   The right internal jugular vein was patent, and free of thrombus  Static images of the vessel was obtained  Visualization of real time needle entry into the vessel was obtained  Fluoroscopic spot image demonstrates a newly placed single lumen chest port via the right internal jugular vein with the most central aspect at the SVC/RA junction  The catheter tubing is smooth in contour  IMPRESSION:     Successful placement of a single lumen chest port via the right internal jugular vein

## 2023-03-21 NOTE — TELEPHONE ENCOUNTER
Patient has appt with RT on 3/23/2023 Dr Bill Bray is for concurrent chemo RT  [facility] for 5FU CI entered  Will await RT start date to schedule patient

## 2023-03-21 NOTE — DISCHARGE INSTRUCTIONS
Implanted Venous Access Port     WHAT YOU NEED TO KNOW:   An implanted venous access port is a device used to give treatments and take blood  It may also be called a central venous access device (CVAD)  The port is a small container that is placed under your skin, usually in your upper chest  The port is attached to a catheter that enters a large vein  DISCHARGE INSTRUCTIONS:   Resume your normal diet  Small sips of flat soda will help with mild nausea  Prevent an infection:   Wash your hands often  Use soap and water  Clean your hands before and after you care for your port  Remind everyone who cares for your port to wash their hands  Check your skin for infection every day  Look for redness, swelling, or fluid oozing from the port site  Care for your port:   1  You may shower beginning 48 hours after procedure  2   Leave glue in place  3  It is normal for some bruising to occur  4  Use Tylenol for pain  5  Limit use of arm on the side that your port was placed  Lift nothing heavier than 5 pounds for 1 week, and then gradually increase activity as tolerated  6  DO NOT apply ointment, lotion or cream to port site until incision is healed  Allow glue to fall off  DO NOT attempt to peel glue from skin even it it begins to flake  7  After the port incision is healed you may swim, bathe  Notify the Interventional Radiologist if you have any of the followin  Fever above 101 F    2  Increased redness or swelling after 1st day  3  Increased pain after 1st day  4  Any sign of infection (drainage from port site, skin separation, hot to touch)  5  Persistent nausea or vomiting  Contact Interventional Radiology at [phone] [facility] PATIENTS: Contact Interventional Radiology at [phone]) ([address]: Contact Interventional Radiology at [phone])

## 2023-03-23 ENCOUNTER — RADIATION ONCOLOGY CONSULT (OUTPATIENT)
Dept: RADIATION ONCOLOGY | Facility: HOSPITAL | Age: 58
End: 2023-03-23
Attending: STUDENT IN AN ORGANIZED HEALTH CARE EDUCATION/TRAINING PROGRAM

## 2023-03-23 VITALS
RESPIRATION RATE: 18 BRPM | WEIGHT: 144.6 LBS | BODY MASS INDEX: 26.61 KG/M2 | OXYGEN SATURATION: 98 % | SYSTOLIC BLOOD PRESSURE: 140 MMHG | HEIGHT: 62 IN | DIASTOLIC BLOOD PRESSURE: 88 MMHG | TEMPERATURE: 97.5 F | HEART RATE: 101 BPM

## 2023-03-23 DIAGNOSIS — C20 RECTAL CANCER (HCC): Primary | ICD-10-CM

## 2023-03-23 DIAGNOSIS — C18.7 MALIGNANT NEOPLASM OF SIGMOID COLON (HCC): Primary | ICD-10-CM

## 2023-03-23 DIAGNOSIS — E04.1 THYROID NODULE: ICD-10-CM

## 2023-03-23 NOTE — PROGRESS NOTES
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 AV  She was started on MIRI at [facility] in [place] with plan for CapeOx x4 months followed by chemoRT and resection however before completing care there, moved to [facility] and transferring her care to [facility]  Plan: Will likely need neoadjuvant chemoRT +/- additional CapeOx/FOLFOX chemotherapy (pending evaluation with medical oncology) followed by resection  - Will need to obtain all imaging from prior to treatment  - Plan to discuss with other medical providers  3/13/23 Hosp admission overnight with hypokalemia  Improved after K supplement  D/C with increase in K-Dur      3/16/23 Med Onc, Dr Rey Cantu  Pt apparently was able to tolerate the first cycle of CapeOX but developed a severe reaction on the second cycle  There are notes in Care Everywhere where the plan at [facility] was to desensitize the patient to oxaliplatin  Do not believe this is now necessary  Last Xeloda was 8 weeks ago  Plan for repeat CT chest, MRI pelvis/staging and CEA level  Plan to speak with Dr Rey Cantu but think its best that patient proceed with long course chemo RT with 5-FU (as long as there is no evidence for disease progression/metastatic disease)  Standard dosing schedule with concurrent RT is 5- mg/m2 IV over 24 hours x 5 days for approximately 5 weeks, not sure she could tolerate 7 days of continuous infusion 5-FU          3/17/23 MRI pelvis rectal cancer staging wo contrast  Unenhanced MRI of the Pelvis (Rectal Protocol)    SINCE MRI PELVIS RECTAL CANCER STAGING DATED 11/1/2022, POST TREATMENT PRIMARY TUMOR ASSESSMENT: Incomplete response (likely residual tumor ) Please note, though incomplete, there has been substantial tumor response to treatment with a significant decrease in size of the high rectal cancer since MRI 11/1/2022  SUSPICIOUS MESORECTAL LYMPH NODES: No    SUSPICIOUS EXTRAMESORECTAL LYMPH NODES: No        3/18/23 CT chest abd pelvis w contrast  1  Probably right upper lobe interstitial opacities are nonspecific and could be due to infection or inflammation of the appropriate clinical context or fibrosis  Malignancy is felt to be less likely but not excluded  May consider short-term follow-up   imaging in 2-3 months for reassessment  2   Consolidative changes in the left lower lobe and inferior lingula may be due to atelectasis  3   1 6 cm heterogeneous nodule the right lobe of the thyroid gland, recommend correlation with thyroid ultrasound if not obtained within the prior 6 months  4   There is an ill-defined area of hypoattenuation within the inferior right hepatic lobe of uncertain etiology  Not appreciated on the previous exam   Consider further assessment with contrast-enhanced MRI  5   Bilateral ureteral stents with calcifications suspected along the proximal right ureteral stent few scattered calcifications along the mid to distal left ureteral stent    6   Additional findings discussed above      This study was marked significant in Epic       3/22/23 IR port placement        Upcoming  3/29/23 Genetics   4/3/23 Cystoscopy ureteroscopy with lithotripsy holmium laser, retrograde pyelogram and insertion stent urethral  4/4/23 Colorectal surgery, Dr Shady Hancock  4/6/23 Salbador Nieto, Dr Nikolai Devries  4/7/23 Pulmonology  5/4/23 GI, Dr Keerthi Borrego  5/24/23 Nephrology      Follow up visit     Oncology History   Malignant neoplasm of sigmoid colon ([facility] [address] )   10/28/2022 Biopsy    Colon, Rectosigmoid Colon Mass Biopsy (1 slide [address] , collected 10/28/2022):  - Adenocarcinoma arising in a tubular adenoma 12/1/2022 Biopsy    DIAGNOSIS LIVER, SEGMENT 3, RESECTION:  - HYALINIZED SUBCAPSULAR AND INTRA-PARENCHYMAL NODULES  NO MALIGNANCY SEEN  SEE NOTE  - MILD MACROVESICULAR STEATOSIS (25%)  NO EVIDENCE OF STEATOHEPATITIS  TRICHOME STAIN SHOWS MILD PORTAL FIBROSIS    - 2+ IRON DEPOSITION WITHIN KUPFFER CELLS (IRON STAIN), CONSISTENT WITH SECONDARY HEMOCHROMATOSIS  NOTE: THIS LIKELY REPRESENTS A MARKEDLY SCLEROTIC HEMANGIOMA OR CHANGES SECONDARY TO INJURY  12/1/2022 Surgery    Laparoscopic diverting colostomy with liver biopsy  Dr Meenu Ignacio       12/25/2022 Initial Diagnosis    Malignant neoplasm of sigmoid colon ([address] )     2022 - 1/10/2023 Chemotherapy    Oxaliplatin  [address]     2/24/2023 -  Cancer Staged    Staging form: Colon and Rectum, AJCC 8th Edition  - Clinical: Stage IIA (cT3, cN0, cM0) - Signed by Hi Duggan MD on 2/24/2023  Total positive nodes: 0       3/27/2023 -  Chemotherapy    fluorouracil (ADRUCIL) ambulatory infusion Soln, 225 mg/m2/day = 1,880 mg, Intravenous, Over 120 hours, 0 of 5 cycles         Review of Systems:  Review of Systems   Constitutional: Negative  HENT: Positive for ear pain (behind right ear; shocking feeling )  Eyes: Negative  Respiratory: Positive for shortness of breath (on occasional )  Cardiovascular: Negative  Gastrointestinal: Positive for blood in stool  Endocrine: Negative  Genitourinary: Positive for dysuria, frequency, hematuria and pelvic pain  Musculoskeletal: Positive for gait problem (walks slowly, holds on to things )  Skin: Positive for rash (itchy where she has chest port )  Allergic/Immunologic: Negative  Hematological: Negative  Psychiatric/Behavioral: Negative          Clinical Trial: no    Teaching no    Health Maintenance   Topic Date Due   • Hepatitis C Screening  Never done   • COVID-19 Vaccine (1) Never done   • HIV Screening  Never done   • BMI: Followup Plan  Never done   • Annual Physical  Never done   • DTaP,Tdap,and Td Vaccines (1 - Tdap) Never done   • Cervical Cancer Screening  Never done   • Breast Cancer Screening: Mammogram  Never done   • Influenza Vaccine (1) Never done   • Depression Screening  02/24/2024   • BMI: Adult  03/21/2024   • Pneumococcal Vaccine: Pediatrics (0 to 5 Years) and At-Risk Patients (6 to 59 Years)  Aged Out   • HIB Vaccine  Aged Out   • IPV Vaccine  Aged Out   • Hepatitis A Vaccine  Aged Out   • Meningococcal ACWY Vaccine  Aged Out   • HPV Vaccine  Aged Out     Patient Active Problem List   Diagnosis   • Malignant neoplasm of sigmoid colon ([facility] [address] )   • History of Clostridium difficile colitis   • Other hemochromatosis   • Primary hypertension   • Sarcoidosis   • Impaired fasting glucose   • Hypokalemia   • Transaminitis   • R flank pain with bilateral hydronephrosis   • Adrenal nodule ([facility] [address] )   • Bleeding from colostomy stoma ([facility] [address] )   • Other specified anemias   • Thrombocytopenia ([facility] [address] )   • Nonrheumatic mitral valve regurgitation   • Advance directive discussed with patient   • Gross hematuria   • Skin lesion   • Abnormal CT of the chest   • Kidney calculi   • Dysuria   • Chest pain   • Low TSH level   • Healthcare maintenance     Past Medical History:   Diagnosis Date   • Cancer ([facility] [address] )    • Colon cancer ([facility] [address] )    • Fall    • Headache    • Hemochromatosis, unspecified    • History of transfusion     2022 - no adverse reaction   • Kidney calculi    • Kidney stone    • Liver disease     hemangioma   • Muscle weakness    • Personal history of COVID-19 12/2022   • Sarcoidosis    • Seizures ([facility] [address] )     2022     Past Surgical History:   Procedure Laterality Date   • ABSCESS DRAINAGE      left breast   • CHEST TUBE INSERTION     • COLON SURGERY      colostomy   • COLONOSCOPY     • FL GUIDED CENTRAL VENOUS ACCESS DEVICE INSERTION  3/21/2023   • FL RETROGRADE PYELOGRAM  1/23/2023   • LUNG BIOPSY     • RI CYSTO/URETERO W/LITHOTRIPSY &INDWELL STENT INSRT Bilateral 1/23/2023 Procedure: CYSTOSCOPY  RIGHT URETEROSCOPY WITH LITHOTRIPSY HOLMIUM LASER, BILATERAL RETROGRADE PYELOGRAM AND BILATERAL  URETERAL STENT INSERTION;  Surgeon: Anabel Kothari MD;  Location: [facility];  Service: Urology   • [place] INSJ TUNNELED CTR VAD W/SUBQ PORT AGE 5 YR/> N/A 3/21/2023    Procedure: INSERTION VENOUS PORT ( PORT-A-CATH) IR;  Surgeon: Chapincito Hernández DO;  Location: [facility];  Service: Interventional Radiology   • TONSILLECTOMY     • URINARY SURGERY Bilateral     bilateral stents     Family History   Problem Relation Age of Onset   • Cancer Mother    • Cirrhosis Father      Social History     Socioeconomic History   • Marital status:      Spouse name: Not on file   • Number of children: Not on file   • Years of education: Not on file   • Highest education level: Not on file   Occupational History   • Not on file   Tobacco Use   • Smoking status: Never     Passive exposure: Past   • Smokeless tobacco: Never   Vaping Use   • Vaping Use: Never used   Substance and Sexual Activity   • Alcohol use: Never   • Drug use: Never   • Sexual activity: Not Currently   Other Topics Concern   • Not on file   Social History Narrative   • Not on file     Social Determinants of Health     Financial Resource Strain: Not on file   Food Insecurity: No Food Insecurity   • Worried About Running Out of Food in the Last Year: Never true   • Ran Out of Food in the Last Year: Never true   Transportation Needs: No Transportation Needs   • Lack of Transportation (Medical): No   • Lack of Transportation (Non-Medical):  No   Physical Activity: Not on file   Stress: Not on file   Social Connections: Not on file   Intimate Partner Violence: Not on file   Housing Stability: Low Risk    • Unable to Pay for Housing in the Last Year: No   • Number of Places Lived in the Last Year: 1   • Unstable Housing in the Last Year: No       Current Outpatient Medications:   •  acetaminophen (TYLENOL) 325 mg tablet, Take 650 mg by mouth every 6 (six) hours as needed for mild pain, Disp: , Rfl:   •  amLODIPine (NORVASC) 5 mg tablet, Take 5 mg by mouth daily (Patient not taking: Reported on 3/13/2023), Disp: , Rfl:   •  CAPECITABINE PO, Take 1,650 mg by mouth 2 (two) times a day (Patient not taking: Reported on 3/17/2023), Disp: , Rfl:   •  cyanocobalamin (VITAMIN B-12) 1000 MCG tablet, Take 1,000 mcg by mouth daily, Disp: , Rfl:   •  potassium chloride (MICRO-K) 10 MEQ CR capsule, Take 4 capsules (40 mEq total) by mouth 2 (two) times a day for 7 days, Disp: 56 capsule, Rfl: 0  •  Potassium Gluconate 550 MG TABS, Take 1 tablet by mouth in the morning, Disp: , Rfl:   •  predniSONE 10 mg tablet, Take 2 tablets (20 mg total) by mouth daily, Disp: 120 tablet, Rfl: 0  Allergies   Allergen Reactions   • Oxaliplatin Shortness Of Breath     Reactions occurred with 2nd and 3rd infusions (about 1-3 hours from initiation of infusion) and required treatment with steroids and antihistamines  Please refer to allergy note on 2/7/2023 for detailed description of her reactions  There were no vitals filed for this visit

## 2023-03-24 ENCOUNTER — HOSPITAL ENCOUNTER (OUTPATIENT)
Dept: MRI IMAGING | Facility: HOSPITAL | Age: 58
Discharge: HOME | End: 2023-03-24
Attending: STUDENT IN AN ORGANIZED HEALTH CARE EDUCATION/TRAINING PROGRAM

## 2023-03-24 ENCOUNTER — TELEPHONE (OUTPATIENT)
Dept: GENETICS | Facility: CLINIC | Age: 58
End: 2023-03-24

## 2023-03-24 DIAGNOSIS — C20 RECTAL CANCER (HCC): Primary | ICD-10-CM

## 2023-03-24 DIAGNOSIS — C20 RECTAL CANCER (HCC): ICD-10-CM

## 2023-03-24 RX ADMIN — GADOBUTROL 6 ML: 604.72 INJECTION INTRAVENOUS at 17:57

## 2023-03-24 NOTE — TELEPHONE ENCOUNTER
I called Clementina Jerry and reached her contact Sylvia Ontiveros  I confirmed her upcoming genetics appointment on 3/29/2023 at 2:00PM  I also sent her an email reminder to submit her online family history questionnaire

## 2023-03-24 NOTE — PROGRESS NOTES
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 from the AV  She was started on MIRI at [facility] in [place] with plan for CapeOx x4 months followed by chemoRT and resection however suffered difficulty with oxaliplatin and only received 1-2 cycles of chemotherapy  She was seen in consultation on 2/24/23, at that time with plan for neoadjuvant chemoRT vs continuation of MIRI pending discussion with medical oncology  Interval History:   Since her last visit the patient was seen by Dr Drew Mallory, who felt additional neoadjuvant chemotherapy was not necessary with plan to proceed with chemoRT pending restaging  CT C/A/P (3/18/23) demonstrated:  1  Probably right upper lobe interstitial opacities are nonspecific and could be due to infection or inflammation of the appropriate clinical context or fibrosis  Malignancy is felt to be less likely but not excluded  May consider short-term follow-up   imaging in 2-3 months for reassessment  2   Consolidative changes in the left lower lobe and inferior lingula may be due to atelectasis  3   1 6 cm heterogeneous nodule the right lobe of the thyroid gland, recommend correlation with thyroid ultrasound if not obtained within the prior 6 months  4   There is an ill-defined area of hypoattenuation within the inferior right hepatic lobe of uncertain etiology    Not appreciated on the previous exam   Consider further assessment with contrast-enhanced MRI  5   Bilateral ureteral stents with calcifications suspected along the proximal right ureteral stent few scattered calcifications along the mid to distal left ureteral stent  6   Additional findings discussed above    MRI Pelvis (3/17/23) demonstrated:  SINCE MRI PELVIS RECTAL CANCER STAGING DATED 11/1/2022, POST TREATMENT PRIMARY TUMOR ASSESSMENT: Incomplete response (likely residual tumor ) Please note, though incomplete, there has been substantial tumor response to treatment with a significant   decrease in size of the high rectal cancer since MRI 11/1/2022       SUSPICIOUS MESORECTAL LYMPH NODES: No      SUSPICIOUS EXTRAMESORECTAL LYMPH NODES: No      Currently the patient is doing fair overall  She has had ongoing hematuria and is scheduled for cystoscopy in the near future with urology  She has a diverting colostomy though still finds she has output from below  She has pain in the pelvis with urination  Of note the patient has previously undergone liver biopsy and was found to have cirrhotic features associated with secondary hemachromatosis  Historical Information   Oncology History   Malignant neoplasm of sigmoid colon ([facility] [address] )   10/28/2022 Biopsy    Colon, Rectosigmoid Colon Mass Biopsy (1 slide [address] , collected 10/28/2022):  - Adenocarcinoma arising in a tubular adenoma     12/1/2022 Biopsy    DIAGNOSIS LIVER, SEGMENT 3, RESECTION:  - HYALINIZED SUBCAPSULAR AND INTRA-PARENCHYMAL NODULES  NO MALIGNANCY SEEN  SEE NOTE  - MILD MACROVESICULAR STEATOSIS (25%)  NO EVIDENCE OF STEATOHEPATITIS  TRICHOME STAIN SHOWS MILD PORTAL FIBROSIS    - 2+ IRON DEPOSITION WITHIN KUPFFER CELLS (IRON STAIN), CONSISTENT WITH SECONDARY HEMOCHROMATOSIS  NOTE: THIS LIKELY REPRESENTS A MARKEDLY SCLEROTIC HEMANGIOMA OR CHANGES SECONDARY TO INJURY  12/1/2022 Surgery    Laparoscopic diverting colostomy with liver biopsy   Dr Kayleigh Lombardo       12/25/2022 Initial Diagnosis    Malignant neoplasm of sigmoid colon ([facility] [address] )     2022 - 1/10/2023 Chemotherapy    Oxaliplatin   [address]     2/24/2023 -  Cancer Staged    Staging form: Colon and Rectum, AJCC 8th Edition  - Clinical: Stage IIA (cT3, cN0, cM0) - Signed by Lois Madrid MD on 2/24/2023  Total positive nodes: 0       3/27/2023 -  Chemotherapy    fluorouracil (ADRUCIL) ambulatory infusion Soln, 225 mg/m2/day = 1,880 mg, Intravenous, Over 120 hours, 0 of 5 cycles         Past Medical History:   Diagnosis Date   • Cancer ([place] [address] )    • Colon cancer ([place] [address] )    • Fall    • Headache    • Hemochromatosis, unspecified    • History of transfusion     2022 - no adverse reaction   • Kidney calculi    • Kidney stone    • Liver disease     hemangioma   • Muscle weakness    • Personal history of COVID-19 12/2022   • Sarcoidosis    • Seizures ([place] [address] )     2022     Past Surgical History:   Procedure Laterality Date   • ABSCESS DRAINAGE      left breast   • CHEST TUBE INSERTION     • COLON SURGERY      colostomy   • COLONOSCOPY     • FL GUIDED CENTRAL VENOUS ACCESS DEVICE INSERTION  3/21/2023   • FL RETROGRADE PYELOGRAM  1/23/2023   • LUNG BIOPSY     • ID CYSTO/URETERO W/LITHOTRIPSY &INDWELL STENT INSRT Bilateral 1/23/2023    Procedure: CYSTOSCOPY  RIGHT URETEROSCOPY WITH LITHOTRIPSY HOLMIUM LASER, BILATERAL RETROGRADE PYELOGRAM AND BILATERAL  URETERAL STENT INSERTION;  Surgeon: Tj Austin MD;  Location: [facility];  Service: Urology   • ID INSJ TUNNELED CTR VAD W/SUBQ PORT AGE 5 YR/> N/A 3/21/2023    Procedure: INSERTION VENOUS PORT ( PORT-A-CATH) IR;  Surgeon: Luis Alberto Gregory DO;  Location: [facility];  Service: Interventional Radiology   • TONSILLECTOMY     • URINARY SURGERY Bilateral     bilateral stents       Social History   Social History     Substance and Sexual Activity   Alcohol Use Never     Social History     Substance and Sexual Activity   Drug Use Never     Social History     Tobacco Use Smoking Status Never   • Passive exposure: Past   Smokeless Tobacco Never         Meds/Allergies     Current Outpatient Medications:   •  acetaminophen (TYLENOL) 325 mg tablet, Take 650 mg by mouth every 6 (six) hours as needed for mild pain, Disp: , Rfl:   •  cyanocobalamin (VITAMIN B-12) 1000 MCG tablet, Take 1,000 mcg by mouth daily, Disp: , Rfl:   •  predniSONE 10 mg tablet, Take 2 tablets (20 mg total) by mouth daily, Disp: 120 tablet, Rfl: 0  •  amLODIPine (NORVASC) 5 mg tablet, Take 5 mg by mouth daily (Patient not taking: Reported on 3/13/2023), Disp: , Rfl:   •  CAPECITABINE PO, Take 1,650 mg by mouth 2 (two) times a day (Patient not taking: Reported on 3/17/2023), Disp: , Rfl:   •  potassium chloride (MICRO-K) 10 MEQ CR capsule, Take 4 capsules (40 mEq total) by mouth 2 (two) times a day for 7 days, Disp: 56 capsule, Rfl: 0  •  Potassium Gluconate 550 MG TABS, Take 1 tablet by mouth in the morning (Patient not taking: Reported on 3/23/2023), Disp: , Rfl:   Allergies   Allergen Reactions   • Oxaliplatin Shortness Of Breath     Reactions occurred with 2nd and 3rd infusions (about 1-3 hours from initiation of infusion) and required treatment with steroids and antihistamines  Please refer to allergy note on 2/7/2023 for detailed description of her reactions  Review of Systems   Constitutional: Negative  HENT: Positive for ear pain (behind right ear; shocking feeling )  Eyes: Negative  Respiratory: Positive for shortness of breath (on occasional )  Cardiovascular: Negative  Gastrointestinal: Positive for blood in stool  Endocrine: Negative  Genitourinary: Positive for dysuria, frequency, hematuria and pelvic pain  Musculoskeletal: Positive for gait problem (walks slowly, holds on to things )  Skin: Positive for rash (itchy where she has chest port )  Allergic/Immunologic: Negative  Hematological: Negative  Psychiatric/Behavioral: Negative        OBJECTIVE:   /88 (BP Location: Right arm, Patient Position: Sitting, Cuff Size: Standard)   Pulse 101   Temp 97 5 °F (36 4 °C) (Temporal)   Resp 18   Ht 5' 2" (1 575 m)   Wt 65 6 kg (144 lb 9 6 oz)   SpO2 98%   BMI 26 45 kg/m²   Pain Assessment:  0  ECOG/Zubrod/WHO: 1 - Symptomatic but completely ambulatory    Physical Exam   Well appearing  NAD  No increased work of breathing  Extremities warm and well perfused       RESULTS    Lab Results:   Recent Results (from the past 672 hour(s))   Urine culture    Collection Time: 03/13/23 11:12 AM    Specimen: Urine, Clean Catch   Result Value Ref Range    Urine Culture <10,000 cfu/ml    Comprehensive metabolic panel    Collection Time: 03/13/23 11:12 AM   Result Value Ref Range    Sodium 142 135 - 147 mmol/L    Potassium 2 3 (LL) 3 5 - 5 3 mmol/L    Chloride 109 (H) 96 - 108 mmol/L    CO2 30 21 - 32 mmol/L    ANION GAP 3 (L) 4 - 13 mmol/L    BUN 15 5 - 25 mg/dL    Creatinine 0 79 0 60 - 1 30 mg/dL    Glucose, Fasting 93 65 - 99 mg/dL    Calcium 9 7 8 3 - 10 1 mg/dL    Corrected Calcium 10 4 (H) 8 3 - 10 1 mg/dL    AST 54 (H) 5 - 45 U/L    ALT 76 12 - 78 U/L    Alkaline Phosphatase 391 (H) 46 - 116 U/L    Total Protein 6 7 6 4 - 8 4 g/dL    Albumin 3 1 (L) 3 5 - 5 0 g/dL    Total Bilirubin 1 60 (H) 0 20 - 1 00 mg/dL    eGFR 84 ml/min/1 73sq m   Angiotensin converting enzyme    Collection Time: 03/13/23 11:12 AM   Result Value Ref Range    Angio Convert Enzyme 56 14 - 82 U/L   ECG 12 lead    Collection Time: 03/13/23  6:04 PM   Result Value Ref Range    Ventricular Rate 85 BPM    Atrial Rate 85 BPM    MN Interval 170 ms    QRSD Interval 80 ms    QT Interval 380 ms    QTC Interval 452 ms    P Axis 60 degrees    QRS Axis 14 degrees    T Wave Axis 38 degrees   CBC and differential    Collection Time: 03/13/23  6:08 PM   Result Value Ref Range    WBC 6 95 4 31 - 10 16 Thousand/uL    RBC 3 91 3 81 - 5 12 Million/uL    Hemoglobin 12 2 11 5 - 15 4 g/dL    Hematocrit 39 1 34 8 - 46 1 %    MCV 100 (H) 82 - 98 fL    MCH 31 2 26 8 - 34 3 pg    MCHC 31 2 (L) 31 4 - 37 4 g/dL    RDW 14 2 11 6 - 15 1 %    MPV 9 8 8 9 - 12 7 fL    Platelets 022 006 - 611 Thousands/uL    nRBC 0 /100 WBCs    Neutrophils Relative 90 (H) 43 - 75 %    Immat GRANS % 1 0 - 2 %    Lymphocytes Relative 5 (L) 14 - 44 %    Monocytes Relative 4 4 - 12 %    Eosinophils Relative 0 0 - 6 %    Basophils Relative 0 0 - 1 %    Neutrophils Absolute 6 30 1 85 - 7 62 Thousands/µL    Immature Grans Absolute 0 04 0 00 - 0 20 Thousand/uL    Lymphocytes Absolute 0 34 (L) 0 60 - 4 47 Thousands/µL    Monocytes Absolute 0 25 0 17 - 1 22 Thousand/µL    Eosinophils Absolute 0 01 0 00 - 0 61 Thousand/µL    Basophils Absolute 0 01 0 00 - 0 10 Thousands/µL   Comprehensive metabolic panel    Collection Time: 03/13/23  6:08 PM   Result Value Ref Range    Sodium 139 135 - 147 mmol/L    Potassium 2 8 (L) 3 5 - 5 3 mmol/L    Chloride 102 96 - 108 mmol/L    CO2 28 21 - 32 mmol/L    ANION GAP 9 4 - 13 mmol/L    BUN 14 5 - 25 mg/dL    Creatinine 0 82 0 60 - 1 30 mg/dL    Glucose 293 (H) 65 - 140 mg/dL    Calcium 9 0 8 4 - 10 2 mg/dL    Corrected Calcium 9 5 8 3 - 10 1 mg/dL    AST 49 (H) 13 - 39 U/L    ALT 58 (H) 7 - 52 U/L    Alkaline Phosphatase 366 (H) 34 - 104 U/L    Total Protein 6 2 (L) 6 4 - 8 4 g/dL    Albumin 3 4 (L) 3 5 - 5 0 g/dL    Total Bilirubin 1 30 (H) 0 20 - 1 00 mg/dL    eGFR 80 ml/min/1 73sq m   Lipase    Collection Time: 03/13/23  6:08 PM   Result Value Ref Range    Lipase 127 (H) 11 - 82 u/L   Hemoglobin A1C    Collection Time: 03/13/23  6:08 PM   Result Value Ref Range    Hemoglobin A1C 6 0 (H) Normal 3 8-5 6%; PreDiabetic 5 7-6 4%;  Diabetic >=6 5%; Glycemic control for adults with diabetes <7 0% %     mg/dl   Magnesium    Collection Time: 03/13/23 10:16 PM   Result Value Ref Range    Magnesium 1 9 1 9 - 2 7 mg/dL   Basic metabolic panel    Collection Time: 03/13/23 10:16 PM   Result Value Ref Range    Sodium 143 135 - 147 mmol/L Potassium 2 9 (L) 3 5 - 5 3 mmol/L    Chloride 108 96 - 108 mmol/L    CO2 27 21 - 32 mmol/L    ANION GAP 8 4 - 13 mmol/L    BUN 11 5 - 25 mg/dL    Creatinine 0 69 0 60 - 1 30 mg/dL    Glucose 260 (H) 65 - 140 mg/dL    Calcium 8 3 (L) 8 4 - 10 2 mg/dL    eGFR 98 ml/min/1 73sq m   Basic metabolic panel    Collection Time: 03/14/23  4:37 AM   Result Value Ref Range    Sodium 145 135 - 147 mmol/L    Potassium 2 9 (L) 3 5 - 5 3 mmol/L    Chloride 112 (H) 96 - 108 mmol/L    CO2 24 21 - 32 mmol/L    ANION GAP 9 4 - 13 mmol/L    BUN 11 5 - 25 mg/dL    Creatinine 0 61 0 60 - 1 30 mg/dL    Glucose 92 65 - 140 mg/dL    Glucose, Fasting 92 65 - 99 mg/dL    Calcium 8 4 8 4 - 10 2 mg/dL    eGFR 102 ml/min/1 73sq m   CBC    Collection Time: 03/14/23  4:37 AM   Result Value Ref Range    WBC 5 84 4 31 - 10 16 Thousand/uL    RBC 3 66 (L) 3 81 - 5 12 Million/uL    Hemoglobin 11 4 (L) 11 5 - 15 4 g/dL    Hematocrit 36 6 34 8 - 46 1 %     (H) 82 - 98 fL    MCH 31 1 26 8 - 34 3 pg    MCHC 31 1 (L) 31 4 - 37 4 g/dL    RDW 14 0 11 6 - 15 1 %    Platelets 986 561 - 202 Thousands/uL    MPV 10 0 8 9 - 12 7 fL   Magnesium    Collection Time: 03/14/23  4:37 AM   Result Value Ref Range    Magnesium 1 9 1 9 - 2 7 mg/dL   TSH, 3rd generation with Free T4 reflex    Collection Time: 03/14/23  4:37 AM   Result Value Ref Range    TSH 3RD GENERATON 0 126 (L) 0 450 - 4 500 uIU/mL   T4, free    Collection Time: 03/14/23  4:37 AM   Result Value Ref Range    Free T4 1 04 0 76 - 1 46 ng/dL   UA (URINE) with reflex to Scope    Collection Time: 03/14/23  5:41 AM   Result Value Ref Range    Color, UA Light Yellow     Clarity, UA Clear     Specific Gravity, UA 1 008 1 003 - 1 030    pH, UA 7 5 4 5, 5 0, 5 5, 6 0, 6 5, 7 0, 7 5, 8 0    Leukocytes, UA Moderate (A) Negative    Nitrite, UA Negative Negative    Protein, UA Trace (A) Negative mg/dl    Glucose, UA Negative Negative mg/dl    Ketones, UA Negative Negative mg/dl    Urobilinogen, UA <2 0 <2 0 mg/dl mg/dl    Bilirubin, UA Negative Negative    Occult Blood, UA Large (A) Negative   Urine Microscopic    Collection Time: 03/14/23  5:41 AM   Result Value Ref Range    RBC, UA Innumerable (A) None Seen, 1-2 /hpf    WBC, UA 10-20 (A) None Seen, 1-2 /hpf    Epithelial Cells None Seen None Seen, Occasional /hpf    Bacteria, UA None Seen None Seen, Occasional /hpf   Basic metabolic panel    Collection Time: 03/14/23 12:58 PM   Result Value Ref Range    Sodium 140 135 - 147 mmol/L    Potassium 3 3 (L) 3 5 - 5 3 mmol/L    Chloride 108 96 - 108 mmol/L    CO2 25 21 - 32 mmol/L    ANION GAP 7 4 - 13 mmol/L    BUN 14 5 - 25 mg/dL    Creatinine 0 70 0 60 - 1 30 mg/dL    Glucose 217 (H) 65 - 140 mg/dL    Calcium 8 8 8 4 - 10 2 mg/dL    eGFR 97 ml/min/1 73sq m   Urine culture    Collection Time: 03/17/23 10:07 AM    Specimen: Urine, Clean Catch   Result Value Ref Range    Urine Culture No Growth <1000 cfu/mL        Imaging Studies:XR chest pa & lateral    Result Date: 3/15/2023  Narrative: CHEST INDICATION:   D86 0: Sarcoidosis of lung  Cough and shortness of breath for a few months  Diagnosed with colon cancer 2 months ago  COMPARISON:  Abdomen CT 2/11/2023  EXAM PERFORMED/VIEWS:  XR CHEST PA & LATERAL  DUAL ENERGY SUBTRACTION  FINDINGS: Cardiomediastinal silhouette normal  No acute disease  Mild benign scar in the left base, corresponding with the CT  No effusion or pneumothorax  Bones normal for age  Upper abdomen normal  Partially imaged left ureteral stent  Impression: No acute cardiopulmonary disease  Workstation performed: [id number]     CT chest abdomen pelvis w contrast    Result Date: 3/22/2023  Narrative: CT CHEST, ABDOMEN AND PELVIS WITH IV CONTRAST INDICATION:   C18 7: Malignant neoplasm of sigmoid colon  COMPARISON:  CT abdomen and pelvis dated 2/11/2023 and 1/26/2023 TECHNIQUE: CT examination of the chest, abdomen and pelvis was performed    In addition to portal venous phase postcontrast scanning through the abdomen and pelvis, delayed phase postcontrast scanning was performed through the upper abdominal viscera  Axial, sagittal, and coronal 2D reformatted images were created from the source data and submitted for interpretation  Radiation dose length product (DLP) for this visit:  994 12 mGy-cm   This examination, like all CT scans performed in the [facility], was performed utilizing techniques to minimize radiation dose exposure, including the use of iterative  reconstruction and automated exposure control  IV Contrast:  100 mL of iohexol (OMNIPAQUE) Enteric Contrast: Enteric contrast was administered  FINDINGS: CHEST LUNGS:  Subpleural consolidative changes in the left lower lobe and inferior lingula  Nonspecific interstitial opacities in the right upper lobe  No tracheal or endobronchial lesion  PLEURA:  Unremarkable  HEART/GREAT VESSELS: Mild coronary vascular calcifications  No thoracic aortic aneurysm  MEDIASTINUM AND DUSTY:  Subcentimeter mediastinal lymph nodes  CHEST WALL AND LOWER NECK: Heterogeneous 1 6 cm nodule the right lobe of the thyroid gland  Incidental discovery of one or more thyroid nodule(s) measuring more than 1 5 cm and without suspicious features is noted in this patient who is above 28years old; according to guidelines published in the February 2015 white paper on incidental thyroid nodules in the Journal of the [address] [place] [address] of Radiology Keo Krueger), further characterization with thyroid ultrasound is recommended  Supraclavicular lymph node  measuring 1 1 cm in short axis  ABDOMEN LIVER/BILIARY TREE:  Subtle area of hypoattenuation in the inferior right hepatic lobe of the portal venous phase seen on 2/136 and coronal series 601 image #68 without circumscription  GALLBLADDER:  Contracted without gallstones  SPLEEN:  Unremarkable  PANCREAS:  Unremarkable  ADRENAL GLANDS:  Unremarkable  KIDNEYS/URETERS:  Bilateral hydronephrosis  Bilateral nonobstructing calculi  Bilateral ureteral stents terminating in the region of the urinary bladder lumen  There is apparent calcification surrounding the proximal right ureteral stent  Layering calculus in the left renal pelvis measuring 6 mm in length  Nonspecific inflammatory changes surrounding the ureters  There may be a few small calcifications adjacent to the mid to distal left ureteral stent  STOMACH AND BOWEL:  Left mid abdominal colostomy  No bowel obstruction  Underdistention of the rectum and sigmoid colon diminishes diagnostic assessment  APPENDIX:  No findings to suggest appendicitis  ABDOMINOPELVIC CAVITY:  Scattered retroperitoneal lymph nodes are present without CT size enlargement  No ascites or pneumoperitoneum  VESSELS:  Unremarkable for patient's age  PELVIS REPRODUCTIVE ORGANS:  Unremarkable for patient's age  URINARY BLADDER:  Under distention diminishing diagnostic assessment  ABDOMINAL WALL/INGUINAL REGIONS:  Small fat-containing umbilical hernia  OSSEOUS STRUCTURES:  No acute fracture or destructive osseous lesion  Impression: 1  Probably right upper lobe interstitial opacities are nonspecific and could be due to infection or inflammation of the appropriate clinical context or fibrosis  Malignancy is felt to be less likely but not excluded  May consider short-term follow-up  imaging in 2-3 months for reassessment  2   Consolidative changes in the left lower lobe and inferior lingula may be due to atelectasis  3   1 6 cm heterogeneous nodule the right lobe of the thyroid gland, recommend correlation with thyroid ultrasound if not obtained within the prior 6 months  4   There is an ill-defined area of hypoattenuation within the inferior right hepatic lobe of uncertain etiology  Not appreciated on the previous exam   Consider further assessment with contrast-enhanced MRI   5   Bilateral ureteral stents with calcifications suspected along the proximal right ureteral stent few scattered calcifications along the mid to distal left ureteral stent  6   Additional findings discussed above  This study was marked significant in Epic  Workstation performed: [id number]     MRI pelvis rectal cancer staging wo contrast    Result Date: 3/18/2023  Narrative: MRI PELVIS - WITHOUT CONTRAST (RECTAL CANCER RESTAGING) CLINICAL INFORMATION: -Pretreatment rectal cancer staging: T3c N0  -Prior treatment: Chemotherapy  Based on hematology oncology consult note by Dr Leanne Melo on 3/16/2023, the tumor is a rectosigmoid colon mass initially biopsy on 10/28/2022 with pathology demonstrating adenocarcinoma arising in a tubular adenoma  The patient subsequently underwent laparoscopic diverting colostomy on 12/1/2022  The patient was treated with chemotherapy, completed on 1/10/2023  COMPARISON: MRI pelvis rectal cancer staging 11/1/2022  TECHNIQUE: The following pulse sequences were obtained on:  High resolution Coronal, Sagittal, and Axial FSE T2 weighted images of the rectum, DWI/  ADC, and large field of view axial T1 weighted image and of the pelvis  An additional small field of view, axial oblique T2-weighted sequence was performed through the tumor, perpendicular to the long axis of the rectum at that level  Imaging performed on 1 5T MRI IV contrast was not given  FINDINGS: TREATED TUMOR LOCATION AND CHARACTERISTICS -  Tumor location: 11 4 cm from the anal verge (high rectal cancer) (series 4 image 44)  -  Distance of the lowest extent of treated tumor from the top of the anal sphincter: 10 1 cm (series 4 image 44)  -  Relationship of treated tumor to anterior peritoneal reflection: Above -  Craniocaudal length: 6 4 cm currently (series 4 images 25 and 28)  Pretreatment craniocaudal length was approximately 12 4 cm  -  Maximal wall thickness 2 1 cm currently (series 8 image 20)   Previous wall thickness was 2 7 cm when remeasured in a similar fashion  -  DWI (restricted DWI and low ADC): Present and unchanged from prior  -  T2W signal characteristics: Intermediate signal intensity, no dark T2 signal  ANAL SPHINCTER INVOLVEMENT (FOR LOW RECTAL CA): Absent  EXTRAMURAL VASCULAR INVASION: No (none evident pre-treatment )  MESORECTAL FASCIA (MRF):         -Not Applicable  Tumor at peritonealized portion of the rectum  -Is there a separate tumor deposit, LN or EMVI threatening ( >=  1mm and  <= 2 mm) or invading (< 1 mm) the MRF? No  LYMPH NODES: Post treatment mesorectal/superior rectal lymph nodes and/or tumor deposits >/= 5mm short axis: None  Suspicious extra-mesorectal locoregional nodes: No   REST OF THE PELVIS:       REPRODUCTIVE STRUCTURES: The uterus and bilateral ovaries are unremarkable  The left ovary measures 1 9 x 1 3 cm (5/12)  The right ovary measures 1 9 x 1 4 cm (5/10)  BLADDER:  Normal  OTHER BOWEL LOOPS: Unremarkable MRI appearance  PELVIC CAVITY:  Unremarkable  VASCULAR STRUCTURES:  Limited evaluation of the visualized vasculature on this non-contrast MRI is unremarkable  PELVIC WALL:  Small fat-containing right inguinal hernia  OSSEOUS STRUCTURES: No osseous destruction  L5 vertebral hemangioma  Impression: Unenhanced MRI of the Pelvis (Rectal Protocol) SINCE MRI PELVIS RECTAL CANCER STAGING DATED 11/1/2022, POST TREATMENT PRIMARY TUMOR ASSESSMENT: Incomplete response (likely residual tumor ) Please note, though incomplete, there has been substantial tumor response to treatment with a significant decrease in size of the high rectal cancer since MRI 11/1/2022  SUSPICIOUS MESORECTAL LYMPH NODES: No  SUSPICIOUS EXTRAMESORECTAL LYMPH NODES: No  Workstation performed: [id number]     FL guided central venous access device insertion    Result Date: 3/22/2023  Narrative: Chest port placement 3/21/2023 10:08 AM History: Rectal carcinoma Contrast: None Fluoroscopy time: 15 seconds Number of images: 2 Radiation dose: 1 7 mGy Procedure: The patient was identified verbally and by wristband  Timeout was performed    Informed consent was obtained  All elements of maximal sterile barrier technique were followed (cap, mask, sterile gown, sterile gloves, large sterile sheet, hand hygiene and 2% chlorhexidine for cutaneous antisepsis)  Following obtaining informed consent, the patient was prepped and draped in the usual sterile fashion  Using ultrasound guidance, access was gained to the patient's right internal jugular vein using a micropuncture system  The micropuncture wire was removed and a  035 wire was advanced to the level of the inferior vena cava using fluoroscopic guidance  Using 1% lidocaine, the region of the right anterior chest was was anesthetized  A small incision was made using a 15 blade scalpel  The port pocket was created using blunt dissection  The catheter tubing was tunneled from the incision to the venotomy  The micropuncture dilator was exchanged for a peel-away sheath, using fluoroscopic guidance  The catheter was placed through the peel-away sheath  The catheter was measured and cut to size  The catheter was attached to the port  The port was flushed with saline to ensure patency without evidence of leakage  The port was placed in the port pocket  The port was sutured in the pocket using 3-0 Vicryl  The incisions were approximated with 3-0 Vicryl and tissue adhesive  The patient tolerated the procedure well without apparent immediate complications  The patient left the IR department in unchanged condition  Dr Anurag Gerardo performed and directly supervised the entire procedure  Findings: Using ultrasound guidance, the right internal jugular vein was cannulated using Seldinger technique  The right internal jugular vein was evaluated as a potential access site  The right internal jugular vein was patent, and free of thrombus  Static images of the vessel was obtained  Visualization of real time needle entry into the vessel was obtained   Fluoroscopic spot image demonstrates a newly placed single lumen chest port via the right internal jugular vein with the most central aspect at the SVC/RA junction  The catheter tubing is smooth in contour  Impression: Impression:  Successful placement of a single lumen chest port via the right internal jugular vein  Workstation performed: [id number]     POCT spirometry    Impression: 3/7/2023 - Ratio 86%, FVC 2 78L (87%), FEV1 2 38L (95%) - normal spirometry            Assessment/Plan:  Orders Placed This Encounter   Procedures   • MRI abdomen w wo contrast   • US head neck soft tissue   • Basic metabolic panel   • Radiation Simulation Treatment      We reviewed the patient's recent history including her recent restaging CT C/A/P and MRI Pelvis  She has fortunately had a good response in the pelvis, but has a new area of concern in the lower right liver  We reviewed that this could be due to metastatic disease (considering her ~2 months off chemotherapy) though she also has multiple other potential etiologies considering her sarcoidosis and cirrhosis with secondary hemachromatosis  I have ordered an urgent MRI Abdomen to evaluate this further and pending the results of this will discuss the need for possible biopsy  We did discuss that it would be unusual for her to have discordant responses between her rectum and liver  We discussed that even if this did represent a site of metastasis, I would still recommend proceeding with definitive therapy to the rectum and simultaneous definitive treatment to the liver  Liver treatment could be in the form of resection at the time of rectal surgery or upfront with either ablation or SBRT  We reviewed the risks and benefits of proceeding with neoadjuvant RT  Side effects could include fatigue, myelosuppression, diarrhea, urinary frequency, cystitis, and proctitis  Patients with auto-inflammatory conditions may be at somewhat higher risks for these side effects than others   She was agreeable to proceeding with RT as recommended; an informed consent was signed at today's visit  She will return for CT simulation next week with RT to begin the shortly thereafter  We will plan to treat to a dose of 5040cGy in 28 fractions with concurrent 5-FU  I will coordinate care with medical oncology and colorectal surgery  Raiza Loving MD  6/17/1082,5:08 AM    Portions of the record may have been created with voice recognition software   Occasional wrong word or "sound a like" substitutions may have occurred due to the inherent limitations of voice recognition software   Read the chart carefully and recognize, using context, where substitutions have occurred

## 2023-03-25 ENCOUNTER — HOSPITAL ENCOUNTER (OUTPATIENT)
Dept: ULTRASOUND IMAGING | Facility: HOSPITAL | Age: 58
Discharge: HOME | End: 2023-03-25
Attending: STUDENT IN AN ORGANIZED HEALTH CARE EDUCATION/TRAINING PROGRAM

## 2023-03-25 DIAGNOSIS — E04.1 THYROID NODULE: ICD-10-CM

## 2023-03-27 ENCOUNTER — TELEPHONE (OUTPATIENT)
Dept: HEMATOLOGY ONCOLOGY | Facility: MEDICAL CENTER | Age: 58
End: 2023-03-27

## 2023-03-27 DIAGNOSIS — C18.7 MALIGNANT NEOPLASM OF SIGMOID COLON (HCC): Primary | ICD-10-CM

## 2023-03-27 NOTE — TELEPHONE ENCOUNTER
LVM to the patient in regards to her lab work that needs to be completed prior to her appt on 4/6/23 at 11:20am

## 2023-03-28 ENCOUNTER — TELEPHONE (OUTPATIENT)
Dept: HEMATOLOGY ONCOLOGY | Facility: MEDICAL CENTER | Age: 58
End: 2023-03-28

## 2023-03-28 ENCOUNTER — APPOINTMENT (OUTPATIENT)
Dept: RADIATION ONCOLOGY | Facility: HOSPITAL | Age: 58
End: 2023-03-28
Attending: STUDENT IN AN ORGANIZED HEALTH CARE EDUCATION/TRAINING PROGRAM

## 2023-03-28 DIAGNOSIS — C18.7 MALIGNANT NEOPLASM OF SIGMOID COLON (HCC): Primary | ICD-10-CM

## 2023-03-28 NOTE — TELEPHONE ENCOUNTER
Spoke with Dorita Olivares, confirmed first 2 cycles and he said he would check the rest on her MyChart

## 2023-03-28 NOTE — TELEPHONE ENCOUNTER
Patient scheduled to begin RT at [facility] on 4/10/2023  Will need 5FU pump hookup on 4/10/2023 with disconnect on 4/15/2023  Will send to Infusion schedulers to arrange

## 2023-03-29 ENCOUNTER — CLINICAL SUPPORT (OUTPATIENT)
Dept: GENETICS | Facility: CLINIC | Age: 58
End: 2023-03-29

## 2023-03-29 ENCOUNTER — TELEPHONE (OUTPATIENT)
Dept: GENETICS | Facility: CLINIC | Age: 58
End: 2023-03-29

## 2023-03-29 DIAGNOSIS — C18.7 MALIGNANT NEOPLASM OF SIGMOID COLON (HCC): Primary | ICD-10-CM

## 2023-03-29 NOTE — LETTER
2023     Geraldine Toro, [address]    Patient: Chip Ahr  YOB: 1965  Date of Visit: 3/29/2023      Dear Dr Flynn Aguirre: Thank you for referring [facility] to me for evaluation  Below are my notes for this consultation  If you have questions, please do not hesitate to call me  I look forward to following your patient along with you  Sincerely,        Luz Savage        CC: No Recipients        Pre-Test Genetic Counseling Consult Note    Patient Name: Chip Ahr   /Age: /47 y o  Referring Provider: Geraldine Toro MD    Date of Service: 3/29/2023  Genetic Counselor: Luz Savage MS, [facility]  Interpretation Services: None  [facility]      Marleny Xiao was referred to the [address] [place] [address] and Genetic Assessment Program due to her personal and family history of unknown metastatic cancer  she presents today to discuss the possibility of a hereditary cancer syndrome, options for genetic testing, and implications for her and her family  Her daughter, Sean Álvarez, and former partner, Dana Dang, accompanied her to the appointment  Cancer History and Treatment:   Personal History:   Malignant neoplasm of sigmoid colon ([address] )   10/28/2022 Biopsy     Colon, Rectosigmoid Colon Mass Biopsy (1 slide [address] , collected 10/28/2022):  - Adenocarcinoma arising in a tubular adenoma      2022 Biopsy     DIAGNOSIS LIVER, SEGMENT 3, RESECTION:  - HYALINIZED SUBCAPSULAR AND INTRA-PARENCHYMAL NODULES  NO MALIGNANCY SEEN  SEE NOTE  - MILD MACROVESICULAR STEATOSIS (25%)  NO EVIDENCE OF STEATOHEPATITIS  TRICHOME STAIN SHOWS MILD PORTAL FIBROSIS    - 2+ IRON DEPOSITION WITHIN KUPFFER CELLS (IRON STAIN), CONSISTENT WITH SECONDARY HEMOCHROMATOSIS       NOTE: THIS LIKELY REPRESENTS A MARKEDLY SCLEROTIC HEMANGIOMA OR CHANGES SECONDARY TO INJURY                  2022 Surgery     Laparoscopic diverting colostomy with liver biopsy  Dr Conrad Hurt        12/25/2022 Initial Diagnosis     Malignant neoplasm of sigmoid colon [facility])      2022 - 1/10/2023 Chemotherapy     Oxaliplatin  [address]      2/24/2023 -  Cancer Staged     Staging form: Colon and Rectum, AJCC 8th Edition  - Clinical: Stage IIA (cT3, cN0, cM0) - Signed by David Castorena MD on 2/24/2023  Total positive nodes: 0     RESULTS OF IMMUNOHISTOCHEMICAL ANALYSIS FOR MISMATCH REPAIR PROTEIN LOSS  Antibody            Clone                 Description                     Results  MLH1                 M1                     Mismatch repair protein  Intact nuclear expression  MSH2                [id number]        Mismatch repair protein  Intact nuclear expression  MSH6                SP93                  Mismatch repair protein  Intact nuclear expression  PMS2                 A16-4                 Mismatch repair protein  Intact nuclear expression    Screening Hx:   Breast:      Breast Imaging: Tabatha Keller MD from [facility] reports that Danielle's mammogram was up to       date in 2022     Breast Biopsy: previous biopsy- benign     Colon:  Colonoscopy: completed on 10/28/22 through [address] [place] [address] to view documentation at this time without patient authorization     Gynecologic:  Ovaries and Uterus intact     Skin:  No current screening    Other screening: none     Reproductive History  Age at menarche: 17  Age at first live birth: 31  Menopause: Post Menopausal (54)  Hormone replacement: none     Medical and Surgical History  Pertinent surgical history:   Past Surgical History:   Procedure Laterality Date   • ABSCESS DRAINAGE      left breast   • CHEST TUBE INSERTION     • COLON SURGERY      colostomy   • COLONOSCOPY     • FL GUIDED CENTRAL VENOUS ACCESS DEVICE INSERTION  3/21/2023   • FL RETROGRADE PYELOGRAM  1/23/2023   • LUNG BIOPSY     • [place] CYSTO/URETERO W/LITHOTRIPSY &INDWELL STENT INSRT Bilateral "1/23/2023    Procedure: CYSTOSCOPY  RIGHT URETEROSCOPY WITH LITHOTRIPSY HOLMIUM LASER, BILATERAL RETROGRADE PYELOGRAM AND BILATERAL  URETERAL STENT INSERTION;  Surgeon: Bennett Martinez MD;  [facility]: [facility];  Service: Urology   • [place] INSJ TUNNELED CTR VAD W/SUBQ PORT AGE 5 YR/> N/A 3/21/2023    Procedure: INSERTION VENOUS PORT ( PORT-A-CATH) IR;  Surgeon: Tommy Yuan DO;  [facility];  Service: Interventional Radiology   • TONSILLECTOMY     • URINARY SURGERY Bilateral     bilateral stents      Pertinent medical history:  Past Medical History:   Diagnosis Date   • Cancer [facility])    • Colon cancer ([facility] [address] )    • Fall    • Headache    • Hemochromatosis, unspecified    • History of transfusion     2022 - no adverse reaction   • Kidney calculi    • Kidney stone    • Liver disease     hemangioma   • Muscle weakness    • Personal history of COVID-19 12/2022   • Sarcoidosis    • Seizures ([facility] [address] )     2022         Other History:  Height:   Ht Readings from Last 1 Encounters:   03/23/23 5' 2\" (1 575 m)     Weight:   Wt Readings from Last 1 Encounters:   03/23/23 65 6 kg (144 lb 9 6 oz)       Relevant Family History   Patient reports non-Ashkenazi Mandaeism ancestry  Maternal Family History: Mother: unknown metastatic cancer diagnosed at 76 (d 68)  Limited information about family history- relatives reside in [place]     Paternal Family History:  Non-contributory  Limtied information about family history    Please refer to the scanned pedigree in the Media Tab for a complete family history     *All history is reported as provided by the patient; records are not available for review, except where indicated  Assessment:  We discussed sporadic, familial and hereditary cancer  We reviewed that only 5-10% of cancers are considered hereditary   Cancers such as lung, cancer, cervical cancer, testicular cancer, and liver cancer very rarely have a hereditary etiology, and we cannot test for a genetic " predisposition for these cancers at this time  We also discussed the many factors that influence our risk for cancer such as age, environmental exposures, lifestyle choices and family history  We reviewed the indications suggestive of a hereditary predisposition to cancer  We reviewed the results of the immunohistochemistry staining (IHC) for expression of the mismatch repair genes  Results showed normal expression for the protein products of MLH1, MSH2, MSH6 and PMS2  Since approximately 90-95% of Serrato-syndrome associated tumors show abnormal IHC staining, this result significantly reduces the likelihood that Danielle's colorectal cancer was due to a germline mutation in one of these mismatch repair genes however it does not completely rule it out  It is possible that Keo Lanza carries a Serrato syndrome-related germline mutation that does not impact protein production and thus wouldn't lead to abnormal IHC  Although the personal and family history of cancer/tumors is not consistent with the typical presentation of a hereditary cancer syndrome, there is limited information about family history of the paternal and maternal side  Genetic testing may still be considered to rule out a hereditary cancer syndrome  The risks, benefits, and limitations of genetic testing were reviewed with the patient, as well as genetic discrimination laws, and possible test results (positive, negative, variants of uncertain significance) and their clinical implications  If positive for a mutation, options for managing cancer risk including increased surveillance, chemoprevention, and in some cases prophylactic surgery were discussed  Keo Lanza was informed that if a hereditary cancer syndrome was identified in her, first degree relatives (parents, siblings, and children) have a chance of also inheriting the condition   Genetic testing would allow for predictive genetic testing in other relatives, who may also be at risk depending on their degree of relation  I informed her daughter, Natanael Solis, that if she inherited a pathogenic variant in one of the Serrato syndrome genes, the recommendations may be that she screen for colon cancer with colonoscopies starting at age 22  She was given an educational brochure and an additional information regarding the Genetic Information Non-discrimination Act  Plan: Patient decided not to proceed with testing at this time  She does not want to know about any other cancers she may be at increased risk for and is not interested in pursing additional screening or risk-reducing surgeries

## 2023-03-29 NOTE — PROGRESS NOTES
Pre-Test Genetic Counseling Consult Note    Patient Name: Marianna Hu   /Age: 1965/57 y o  Referring Provider: Christy Tate MD    Date of Service: 3/29/2023  Genetic Counselor: Richardson Beach MS, [facility]  Interpretation Services: None  [facility]      Julia Pavon was referred to the [address] [place] [address] and Genetic Assessment Program due to her personal and family history of unknown metastatic cancer  she presents today to discuss the possibility of a hereditary cancer syndrome, options for genetic testing, and implications for her and her family  Her daughter, Chidi Gonzalez, and former partner, Eliseo Michel, accompanied her to the appointment  Cancer History and Treatment:   Personal History:   Malignant neoplasm of sigmoid colon ([facility] [address] )   10/28/2022 Biopsy     Colon, Rectosigmoid Colon Mass Biopsy (1 [address] , collected 10/28/2022):  - Adenocarcinoma arising in a tubular adenoma      2022 Biopsy     DIAGNOSIS LIVER, SEGMENT 3, RESECTION:  - HYALINIZED SUBCAPSULAR AND INTRA-PARENCHYMAL NODULES  NO MALIGNANCY SEEN  SEE NOTE  - MILD MACROVESICULAR STEATOSIS (25%)  NO EVIDENCE OF STEATOHEPATITIS  TRICHOME STAIN SHOWS MILD PORTAL FIBROSIS    - 2+ IRON DEPOSITION WITHIN KUPFFER CELLS (IRON STAIN), CONSISTENT WITH SECONDARY HEMOCHROMATOSIS  NOTE: THIS LIKELY REPRESENTS A MARKEDLY SCLEROTIC HEMANGIOMA OR CHANGES SECONDARY TO INJURY                  2022 Surgery     Laparoscopic diverting colostomy with liver biopsy  Dr Blaine Hernandez        2022 Initial Diagnosis     Malignant neoplasm of sigmoid colon [facility])       - 1/10/2023 Chemotherapy     Oxaliplatin   [address]      2023 -  Cancer Staged     Staging form: Colon and Rectum, AJCC 8th Edition  - Clinical: Stage IIA (cT3, cN0, cM0) - Signed by uAng Larkin MD on 2023  Total positive nodes: 0     RESULTS OF IMMUNOHISTOCHEMICAL ANALYSIS FOR MISMATCH REPAIR PROTEIN LOSS  Antibody            Clone                 Description                     Results  MLH1                 M1                     Mismatch repair protein  Intact nuclear expression  MSH2                [id number]        Mismatch repair protein  Intact nuclear expression  MSH6                SP93                  Mismatch repair protein  Intact nuclear expression  PMS2                 A16-4                 Mismatch repair protein  Intact nuclear expression    Screening Hx:   Breast:      Breast Imaging: Lizzy Dent MD from [facility] reports that Danielle's mammogram was up to       date in 2022     Breast Biopsy: previous biopsy- benign     Colon:  Colonoscopy: completed on 10/28/22 through [address] [place] [address] to view documentation at this time without patient authorization     Gynecologic:  Ovaries and Uterus intact     Skin:  No current screening    Other screening: none     Reproductive History  Age at menarche: 17  Age at first live birth: 31  Menopause: Post Menopausal (54)  Hormone replacement: none     Medical and Surgical History  Pertinent surgical history:   Past Surgical History:   Procedure Laterality Date   • ABSCESS DRAINAGE      left breast   • CHEST TUBE INSERTION     • COLON SURGERY      colostomy   • COLONOSCOPY     • FL GUIDED CENTRAL VENOUS ACCESS DEVICE INSERTION  3/21/2023   • FL RETROGRADE PYELOGRAM  1/23/2023   • LUNG BIOPSY     • [place] CYSTO/URETERO W/LITHOTRIPSY &INDWELL STENT INSRT Bilateral 1/23/2023    Procedure: CYSTOSCOPY  RIGHT URETEROSCOPY WITH LITHOTRIPSY HOLMIUM LASER, BILATERAL RETROGRADE PYELOGRAM AND BILATERAL  URETERAL STENT INSERTION;  Surgeon: Donn Lobato MD;  [facility];  Service: Urology   • [place] INSJ TUNNELED CTR VAD W/SUBQ PORT AGE 5 YR/> N/A 3/21/2023    Procedure: INSERTION VENOUS PORT ( PORT-A-CATH) IR;  Surgeon: Rosi Lewis DO;  [facility];  Service: Interventional Radiology   • TONSILLECTOMY     • "URINARY SURGERY Bilateral     bilateral stents      Pertinent medical history:  Past Medical History:   Diagnosis Date   • Cancer [facility])    • Colon cancer ([facility] [address] )    • Fall    • Headache    • Hemochromatosis, unspecified    • History of transfusion     2022 - no adverse reaction   • Kidney calculi    • Kidney stone    • Liver disease     hemangioma   • Muscle weakness    • Personal history of COVID-19 12/2022   • Sarcoidosis    • Seizures ([facility] [address] )     2022         Other History:  Height:   Ht Readings from Last 1 Encounters:   03/23/23 5' 2\" (1 575 m)     Weight:   Wt Readings from Last 1 Encounters:   03/23/23 65 6 kg (144 lb 9 6 oz)       Relevant Family History   Patient reports non-Ashkenazi Nondenominational ancestry  Maternal Family History: Mother: unknown metastatic cancer diagnosed at 76 (d 68)  Limited information about family history- relatives reside in [place]     Paternal Family History:  Non-contributory  Limtied information about family history    Please refer to the scanned pedigree in the Media Tab for a complete family history     *All history is reported as provided by the patient; records are not available for review, except where indicated  Assessment:  We discussed sporadic, familial and hereditary cancer  We reviewed that only 5-10% of cancers are considered hereditary  Cancers such as lung, cancer, cervical cancer, testicular cancer, and liver cancer very rarely have a hereditary etiology, and we cannot test for a genetic predisposition for these cancers at this time  We also discussed the many factors that influence our risk for cancer such as age, environmental exposures, lifestyle choices and family history  We reviewed the indications suggestive of a hereditary predisposition to cancer  We reviewed the results of the immunohistochemistry staining (IHC) for expression of the mismatch repair genes  Results showed normal expression for the protein products of MLH1, MSH2, MSH6 and PMS2    " Since approximately 90-95% of Serrato-syndrome associated tumors show abnormal IHC staining, this result significantly reduces the likelihood that Danielle's colorectal cancer was due to a germline mutation in one of these mismatch repair genes however it does not completely rule it out  It is possible that Talib Canales carries a Serrato syndrome-related germline mutation that does not impact protein production and thus wouldn't lead to abnormal IHC  We discussed that Serrato syndrome is also associated with an increased risk for other types of cancer such as uterine cancer, stomach cancer, and ovarian cancer, among other cancers types  If she tests positive for Serrato syndrome, the risk for uterine cancer could be ~50%  Although the personal and family history of cancer/tumors is not consistent with the typical presentation of a hereditary cancer syndrome, there is limited information about family history of the paternal and maternal side  Genetic testing may still be considered to rule out a hereditary cancer syndrome  The risks, benefits, and limitations of genetic testing were reviewed with the patient, as well as genetic discrimination laws, and possible test results (positive, negative, variants of uncertain significance) and their clinical implications  If positive for a mutation, options for managing cancer risk including increased surveillance, chemoprevention, and in some cases prophylactic surgery were discussed  Talib Canales was informed that if a hereditary cancer syndrome was identified in her, first degree relatives (parents, siblings, and children) have a chance of also inheriting the condition  Genetic testing would allow for predictive genetic testing in other relatives, who may also be at risk depending on their degree of relation   I informed her daughter, Susan Ivey, that if she inherited a pathogenic variant in one of the Serrato syndrome genes, the recommendations may be that she screen for colon cancer with colonoscopies starting at age 22  She was given an educational brochure and an additional information regarding the Genetic Information Non-discrimination Act  Plan: Patient decided not to proceed with testing at this time  She does not want to know about any other cancers she may be at increased risk for and is not interested in pursing additional screening or risk-reducing surgeries

## 2023-03-29 NOTE — TELEPHONE ENCOUNTER
Marguerite Paredes called stating that he will br bring Olga Coombs to her appointment today, he was trying to complete the e-check in online but was not able to  I advised that upon arrival someone would be able to check in the patient  He stated that he will bring insurance cards to be uploaded  Explained to Marguerite Paredes consultation was at no - charge and co-pay will not be collected, only the genetic test will be going through insurance  No additional questions

## 2023-03-29 NOTE — PRE-PROCEDURE INSTRUCTIONS
Pre-Surgery Instructions:   Medication Instructions   • acetaminophen (TYLENOL) 325 mg tablet Uses PRN- OK to take day of surgery   • cyanocobalamin (VITAMIN B-12) 1000 MCG tablet Hold day of surgery  • predniSONE 10 mg tablet Take day of surgery  Medication instructions for day surgery reviewed  Please use only a sip of water to take your instructed medications  Avoid all over the counter vitamins, supplements and NSAIDS for one week prior to surgery per anesthesia guidelines  Tylenol is ok to take as needed  You will receive a call one business day prior to surgery with an arrival time and hospital directions  If your surgery is scheduled on a Monday, the hospital will be calling you on the Friday prior to your surgery  If you have not heard from anyone by 8pm, please call the hospital supervisor through the hospital  at [phone]  Sly Donnelly [phone])  Do not eat or drink anything after midnight the night before your surgery, including candy, mints, lifesavers, or chewing gum  Do not drink alcohol 24hrs before your surgery  Try not to smoke at least 24hrs before your surgery  Follow the pre surgery showering instructions as listed in the [facility] Surgical Experience Booklet” or otherwise provided by your surgeon's office  Do not shave the surgical area 24 hours before surgery  Do not apply any lotions, creams, including makeup, cologne, deodorant, or perfumes after showering on the day of your surgery  No contact lenses, eye make-up, or artificial eyelashes  Remove nail polish, including gel polish, and any artificial, gel, or acrylic nails if possible  Remove all jewelry including rings and body piercing jewelry  Wear causal clothing that is easy to take on and off  Consider your type of surgery  Keep any valuables, jewelry, piercings at home  Please bring any specially ordered equipment (sling, braces) if indicated      Arrange for a responsible person to drive you to and from the hospital on the day of your surgery  Visitor Guidelines discussed  Call the surgeon's office with any new illnesses, exposures, or additional questions prior to surgery  Please reference your [facility] Surgical Experience Booklet” for additional information to prepare for your upcoming surgery

## 2023-04-03 ENCOUNTER — TELEPHONE (OUTPATIENT)
Dept: UROLOGY | Facility: CLINIC | Age: 58
End: 2023-04-03

## 2023-04-03 ENCOUNTER — APPOINTMENT (OUTPATIENT)
Dept: RADIOLOGY | Facility: HOSPITAL | Age: 58
End: 2023-04-03

## 2023-04-03 ENCOUNTER — ANESTHESIA EVENT (OUTPATIENT)
Dept: PERIOP | Facility: HOSPITAL | Age: 58
End: 2023-04-03

## 2023-04-03 ENCOUNTER — ANESTHESIA (OUTPATIENT)
Dept: PERIOP | Facility: HOSPITAL | Age: 58
End: 2023-04-03

## 2023-04-03 ENCOUNTER — APPOINTMENT (OUTPATIENT)
Dept: RADIATION ONCOLOGY | Facility: HOSPITAL | Age: 58
End: 2023-04-03
Attending: STUDENT IN AN ORGANIZED HEALTH CARE EDUCATION/TRAINING PROGRAM

## 2023-04-03 ENCOUNTER — HOSPITAL ENCOUNTER (OUTPATIENT)
Facility: HOSPITAL | Age: 58
Setting detail: OUTPATIENT SURGERY
Discharge: HOME | End: 2023-04-03
Attending: UROLOGY | Admitting: UROLOGY

## 2023-04-03 VITALS
HEART RATE: 94 BPM | HEIGHT: 62 IN | BODY MASS INDEX: 26.68 KG/M2 | RESPIRATION RATE: 18 BRPM | DIASTOLIC BLOOD PRESSURE: 78 MMHG | SYSTOLIC BLOOD PRESSURE: 131 MMHG | WEIGHT: 145 LBS | TEMPERATURE: 97.5 F | OXYGEN SATURATION: 94 %

## 2023-04-03 DIAGNOSIS — C18.7 MALIGNANT NEOPLASM OF SIGMOID COLON (HCC): Primary | ICD-10-CM

## 2023-04-03 DIAGNOSIS — N13.30 BILATERAL HYDRONEPHROSIS: Primary | ICD-10-CM

## 2023-04-03 DEVICE — STENT URETERAL 6FR 24CML INLAY OPTIMA: Type: IMPLANTABLE DEVICE | Site: URETER | Status: FUNCTIONAL

## 2023-04-03 DEVICE — STENT URETERAL 7FR 24CM INLAY OPTIMA W/HYDROGLIDE GDWR: Type: IMPLANTABLE DEVICE | Site: URETER | Status: FUNCTIONAL

## 2023-04-03 RX ORDER — LIDOCAINE HYDROCHLORIDE 10 MG/ML
INJECTION, SOLUTION EPIDURAL; INFILTRATION; INTRACAUDAL; PERINEURAL AS NEEDED
Status: DISCONTINUED | OUTPATIENT
Start: 2023-04-03 | End: 2023-04-03

## 2023-04-03 RX ORDER — ACETAMINOPHEN 325 MG/1
650 TABLET ORAL EVERY 4 HOURS PRN
Qty: 30 TABLET | Refills: 0
Start: 2023-04-03

## 2023-04-03 RX ORDER — SODIUM CHLORIDE, SODIUM LACTATE, POTASSIUM CHLORIDE, CALCIUM CHLORIDE 600; 310; 30; 20 MG/100ML; MG/100ML; MG/100ML; MG/100ML
20 INJECTION, SOLUTION INTRAVENOUS CONTINUOUS
Status: DISCONTINUED | OUTPATIENT
Start: 2023-04-03 | End: 2023-04-03 | Stop reason: HOSPADM

## 2023-04-03 RX ORDER — KETAMINE HCL IN NACL, ISO-OSM 100MG/10ML
SYRINGE (ML) INJECTION AS NEEDED
Status: DISCONTINUED | OUTPATIENT
Start: 2023-04-03 | End: 2023-04-03

## 2023-04-03 RX ORDER — CEFAZOLIN SODIUM 1 G/50ML
SOLUTION INTRAVENOUS AS NEEDED
Status: DISCONTINUED | OUTPATIENT
Start: 2023-04-03 | End: 2023-04-03

## 2023-04-03 RX ORDER — MAGNESIUM HYDROXIDE 1200 MG/15ML
LIQUID ORAL AS NEEDED
Status: DISCONTINUED | OUTPATIENT
Start: 2023-04-03 | End: 2023-04-03 | Stop reason: HOSPADM

## 2023-04-03 RX ORDER — CEFAZOLIN SODIUM 1 G/50ML
1000 SOLUTION INTRAVENOUS ONCE
Status: DISCONTINUED | OUTPATIENT
Start: 2023-04-03 | End: 2023-04-03 | Stop reason: HOSPADM

## 2023-04-03 RX ORDER — PHENAZOPYRIDINE HYDROCHLORIDE 200 MG/1
200 TABLET, FILM COATED ORAL 3 TIMES DAILY PRN
Qty: 10 TABLET | Refills: 0 | Status: SHIPPED | OUTPATIENT
Start: 2023-04-03 | End: 2023-04-06

## 2023-04-03 RX ORDER — MIDAZOLAM HYDROCHLORIDE 2 MG/2ML
INJECTION, SOLUTION INTRAMUSCULAR; INTRAVENOUS AS NEEDED
Status: DISCONTINUED | OUTPATIENT
Start: 2023-04-03 | End: 2023-04-03

## 2023-04-03 RX ORDER — SODIUM CHLORIDE, SODIUM LACTATE, POTASSIUM CHLORIDE, CALCIUM CHLORIDE 600; 310; 30; 20 MG/100ML; MG/100ML; MG/100ML; MG/100ML
INJECTION, SOLUTION INTRAVENOUS CONTINUOUS PRN
Status: DISCONTINUED | OUTPATIENT
Start: 2023-04-03 | End: 2023-04-03

## 2023-04-03 RX ORDER — OXYBUTYNIN CHLORIDE 5 MG/1
5 TABLET ORAL EVERY 6 HOURS PRN
Qty: 30 TABLET | Refills: 0 | Status: SHIPPED | OUTPATIENT
Start: 2023-04-03

## 2023-04-03 RX ORDER — OXYCODONE HYDROCHLORIDE 5 MG/1
5 TABLET ORAL EVERY 4 HOURS PRN
Status: DISCONTINUED | OUTPATIENT
Start: 2023-04-03 | End: 2023-04-03 | Stop reason: HOSPADM

## 2023-04-03 RX ORDER — FENTANYL CITRATE 50 UG/ML
INJECTION, SOLUTION INTRAMUSCULAR; INTRAVENOUS AS NEEDED
Status: DISCONTINUED | OUTPATIENT
Start: 2023-04-03 | End: 2023-04-03

## 2023-04-03 RX ORDER — ONDANSETRON 2 MG/ML
4 INJECTION INTRAMUSCULAR; INTRAVENOUS ONCE AS NEEDED
Status: DISCONTINUED | OUTPATIENT
Start: 2023-04-03 | End: 2023-04-03 | Stop reason: HOSPADM

## 2023-04-03 RX ORDER — METOCLOPRAMIDE HYDROCHLORIDE 5 MG/ML
5 INJECTION INTRAMUSCULAR; INTRAVENOUS ONCE AS NEEDED
Status: DISCONTINUED | OUTPATIENT
Start: 2023-04-03 | End: 2023-04-03 | Stop reason: HOSPADM

## 2023-04-03 RX ORDER — PROPOFOL 10 MG/ML
INJECTION, EMULSION INTRAVENOUS AS NEEDED
Status: DISCONTINUED | OUTPATIENT
Start: 2023-04-03 | End: 2023-04-03

## 2023-04-03 RX ORDER — TAMSULOSIN HYDROCHLORIDE 0.4 MG/1
0.4 CAPSULE ORAL
Qty: 15 CAPSULE | Refills: 0 | Status: SHIPPED | OUTPATIENT
Start: 2023-04-03

## 2023-04-03 RX ORDER — OXYCODONE HYDROCHLORIDE 5 MG/1
5 TABLET ORAL EVERY 4 HOURS PRN
Qty: 5 TABLET | Refills: 0 | Status: SHIPPED | OUTPATIENT
Start: 2023-04-03 | End: 2023-04-08

## 2023-04-03 RX ORDER — FENTANYL CITRATE/PF 50 MCG/ML
25 SYRINGE (ML) INJECTION
Status: DISCONTINUED | OUTPATIENT
Start: 2023-04-03 | End: 2023-04-03 | Stop reason: HOSPADM

## 2023-04-03 RX ORDER — SUCCINYLCHOLINE/SOD CL,ISO/PF 100 MG/5ML
SYRINGE (ML) INTRAVENOUS AS NEEDED
Status: DISCONTINUED | OUTPATIENT
Start: 2023-04-03 | End: 2023-04-03

## 2023-04-03 RX ORDER — DEXAMETHASONE SODIUM PHOSPHATE 10 MG/ML
INJECTION, SOLUTION INTRAMUSCULAR; INTRAVENOUS AS NEEDED
Status: DISCONTINUED | OUTPATIENT
Start: 2023-04-03 | End: 2023-04-03

## 2023-04-03 RX ORDER — DOCUSATE SODIUM 100 MG/1
100 CAPSULE, LIQUID FILLED ORAL 2 TIMES DAILY
Qty: 30 CAPSULE | Refills: 0 | Status: SHIPPED | OUTPATIENT
Start: 2023-04-03 | End: 2023-04-18

## 2023-04-03 RX ADMIN — MIDAZOLAM HYDROCHLORIDE 2 MG: 1 INJECTION, SOLUTION INTRAMUSCULAR; INTRAVENOUS at 12:54

## 2023-04-03 RX ADMIN — FENTANYL CITRATE 25 MCG: 50 INJECTION INTRAMUSCULAR; INTRAVENOUS at 14:39

## 2023-04-03 RX ADMIN — PROPOFOL 150 MG: 10 INJECTION, EMULSION INTRAVENOUS at 13:01

## 2023-04-03 RX ADMIN — LIDOCAINE HYDROCHLORIDE 50 MG: 10 INJECTION, SOLUTION EPIDURAL; INFILTRATION; INTRACAUDAL at 13:01

## 2023-04-03 RX ADMIN — FENTANYL CITRATE 50 MCG: 50 INJECTION, SOLUTION INTRAMUSCULAR; INTRAVENOUS at 13:01

## 2023-04-03 RX ADMIN — DEXAMETHASONE SODIUM PHOSPHATE 10 MG: 10 INJECTION, SOLUTION INTRAMUSCULAR; INTRAVENOUS at 13:01

## 2023-04-03 RX ADMIN — Medication 20 MG: at 13:21

## 2023-04-03 RX ADMIN — SODIUM CHLORIDE, SODIUM LACTATE, POTASSIUM CHLORIDE, AND CALCIUM CHLORIDE: .6; .31; .03; .02 INJECTION, SOLUTION INTRAVENOUS at 12:15

## 2023-04-03 RX ADMIN — FENTANYL CITRATE 50 MCG: 50 INJECTION, SOLUTION INTRAMUSCULAR; INTRAVENOUS at 13:20

## 2023-04-03 RX ADMIN — CEFAZOLIN SODIUM 1000 MG: 1 SOLUTION INTRAVENOUS at 12:54

## 2023-04-03 RX ADMIN — Medication 80 MG: at 13:01

## 2023-04-03 NOTE — TELEPHONE ENCOUNTER
Patient with complex stone disease including a's history of severely impacted right proximal ureteral calculus and resulting ureteral stricture  She has a new right-sided ureteral stent as well as a left ureteral stent which was exchanged today  Patient is about to be initiated on chemotherapy for colon cancer  As such I recommend maintaining her ureteral stents until she has completed her chemotherapy and any surgical treatment necessary  Please go ahead and schedule her for a lateral ureteral stent exchange in the [place] [id number] in 3 months time  Thank you  Does not need preop visit  Does need urine culture 2 weeks prior

## 2023-04-03 NOTE — DISCHARGE INSTR - AVS FIRST PAGE
Mandi Christensen:    Your surgery went very well  I was able to address the remainder of the stones within your right ureter  I exchange both your right as well as your left ureteral stent successfully  As before, I remain concerned that this stone has resulted in significant damage or scar tissue to your right ureter  There is a possibility that you will require regular surgical procedures to allow your kidney to drain well in the long-term  We will certainly continue to monitor things closely  I Do recommend maintaining your ureteral stents until you complete your course of chemotherapy  I will work with your oncology team to determine the timing for next steps    Please take your medications as prescribed with caution for comfort  Most importantly please drink 6-8 glasses of water per day    Please call with any questions or concerns  Ashtyn Borja MD,PhD  [facility]  [phone]            WHAT IS A STENT? At the end of the procedure, your doctor may place a stent into your ureter  A stent is a thin, flexible piece of plastic that will hold open your ureter while the remaining small pieces of stone pass  This allows your kidney to drain easily and prevents you from having to “pass” these small stone pieces on your own, which could be painful  The stent is about 12 inches long and looks and feels like a thin piece of spaghetti  AFTER THE PROCEDURE  After the procedure you may experience the following symptoms  All of these are normal and should resolve within 1 or 2 days after your stent is removed    Urinary frequency (urinating more often than usual)  Urinary urgency (the sensation that you need to urinate right away)  Painful urination (this can be pain in your bladder or in your back when  you urinate)  Blood in your urine ( a stent can irritate the lining of your bladder causing it to bleed)  Back/Flank pain, especially with urination  You will receive a prescription for narcotic pain medication after the procedure  You will also receive a prescription for tamsulosin which you will take once a day for 2 weeks to help relax your ureter and decrease stent discomfort  You will also need to purchase a stool softener (i e  Colace) or mild laxative (i e  Miralax) as the narcotic pain medication can make you constipated  This is important as constipation can exacerbate stent related symptoms

## 2023-04-03 NOTE — PROGRESS NOTES
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, she has received diverting colostomy, we discussed on personal review of her imaging that this appears more likely a mid to upper rectal cancer and agree with plan for neoadjuvant chemoradiation therapy, also discussed this with both oncology and radiation oncology  PLAN:  Sigmoidoscopy to ascertain height of tumor as she has been recommended radiation therapy from prior hospitalization, may try to avoid in sarcoid if borderline tumor height or high rectal cancer/rectosigmoid  Onc followup rule out liver metastatic disease with new liver lesion seen  To be presented at tumor conference  CC:  Dr Chiquita Steiner, PA-C  Dr Eduardo Trujillo, RN  [facility]  GI Tumor Conference    HPI  Carmelo Felton is a 56 y o  female who is new to the practice  She was diagnosed with malignant neoplasm of sigmoid colon in December  Patient recently moved from [place] to [place] and is looking to establish care  Patient has a personal history of rectal cancer T3 N0 M0  She is status post laparoscopic diverting colostomy with liver biopsy with Dr Jona Love on 12/1/22  She has seen Rad/Onc and will be starting chemoradiation soon  CT chest abdomen and pelvis on 3/18/2023 reported:  1  Probably right upper lobe interstitial opacities are nonspecific and could be due to infection or inflammation of the appropriate clinical context or fibrosis  Malignancy is felt to be less likely but not excluded  May consider short-term follow-up  imaging in 2-3 months for reassessment  2   Consolidative changes in the left lower lobe and inferior lingula may be due to atelectasis    3   1 6 cm heterogeneous nodule the right lobe of the thyroid gland, recommend correlation with thyroid ultrasound if not obtained within the prior 6 months  4   There is an ill-defined area of hypoattenuation within the inferior right hepatic lobe of uncertain etiology  Not appreciated on the previous exam   Consider further assessment with contrast-enhanced MRI  5   Bilateral ureteral stents with calcifications suspected along the proximal right ureteral stent few scattered calcifications along the mid to distal left ureteral stent  6   Additional findings discussed above  MRI abdomen on 3/24/2023 reported:  Corresponding to the finding on recent CT, there is a 1 6 x 1 5 cm right hepatic lobe segment 6 lesion with imaging characteristics as described above  Because it is new compared to the 1/26/2023 CT, and it does not have clearly benign features, it is suspicious for metastasis  Lesion best seen on series 5 image 28, and series 11 images 81 and 197      Colonoscopy on 10/28/2022 by Dr Bao Juarez  The perianal and digital rectal examinations were normal  A fungating, polypoid and ulcerated partially obstructing large mass was found in the recto-sigmoid colon from 12-17 cm from the anal verge  The mass was circumferential  The mass measured five cm in length  Oozing was present  This was biopsied with a cold forceps for histology  An area was tattooed with an injection of 5 mL of [place] ink just distal  to the mass  A 4 mm polyp was found in the descending colon  The polyp was sessile  A 10 mm polyp was found in the cecum  The polyp was sessile  It was not removed  The retroflexed view of the distal rectum and anal verge was normal and showed no anal or rectal abnormalities        Colonoscopy pathology reported:  RECTOSIGMOID COLON, MASS, BIOPSY:  - INVASIVE MODERATELY DIFFERENTIATED COLONIC ADENOCARCINOMA      Historical Information   Past Medical History:   Diagnosis Date   • Cancer [facility])    • Colon cancer [facility])    • Fall • Headache    • Hemochromatosis, unspecified    • History of transfusion     2022 - no adverse reaction   • Kidney calculi    • Kidney stone    • Liver disease     hemangioma   • Muscle weakness    • Personal history of COVID-19 12/2022   • Sarcoidosis    • Seizures ([address] )     2022     Past Surgical History:   Procedure Laterality Date   • ABSCESS DRAINAGE      left breast   • CHEST TUBE INSERTION     • COLON SURGERY      colostomy   • COLONOSCOPY     • FL GUIDED CENTRAL VENOUS ACCESS DEVICE INSERTION  3/21/2023   • FL RETROGRADE PYELOGRAM  1/23/2023   • FL RETROGRADE PYELOGRAM  4/3/2023   • LUNG BIOPSY     • IL CYSTO/URETERO W/LITHOTRIPSY &INDWELL STENT INSRT Bilateral 1/23/2023    Procedure: CYSTOSCOPY  RIGHT URETEROSCOPY WITH LITHOTRIPSY HOLMIUM LASER, BILATERAL RETROGRADE PYELOGRAM AND BILATERAL  URETERAL STENT INSERTION;  Surgeon: Christine Miller MD;  [facility];  Service: Urology   • IL CYSTO/URETERO W/LITHOTRIPSY &INDWELL STENT INSRT Right 4/3/2023    Procedure: RIGHT URETEROSCOPY WITH LITHOTRIPSY HOLMIUM LASER, RETROGRADE PYELOGRAM; BILATERAL EXCHANGE STENT URETERAL;  Surgeon: Christine Miller MD;  [facility];  Service: Urology   • IL INSJ TUNNELED CTR VAD W/SUBQ PORT AGE 5 YR/> N/A 3/21/2023    Procedure: INSERTION VENOUS PORT ( PORT-A-CATH) IR;  Surgeon: Vikki Collins DO;  [facility];  Service: Interventional Radiology   • TONSILLECTOMY     • URINARY SURGERY Bilateral     bilateral stents       Meds/Allergies       Current Outpatient Medications:   •  acetaminophen (TYLENOL) 325 mg tablet, Take 650 mg by mouth every 6 (six) hours as needed for mild pain (Patient not taking: Reported on 4/6/2023), Disp: , Rfl:   •  acetaminophen (TYLENOL) 325 mg tablet, Take 2 tablets (650 mg total) by mouth every 4 (four) hours as needed for mild pain (Patient not taking: Reported on 4/6/2023), Disp: 30 tablet, Rfl: 0  •  amLODIPine (NORVASC) 5 mg tablet, Take 5 mg by mouth daily (Patient not taking: Reported on 3/13/2023), Disp: , Rfl:   •  CAPECITABINE PO, Take 1,650 mg by mouth 2 (two) times a day (Patient not taking: Reported on 3/17/2023), Disp: , Rfl:   •  cyanocobalamin (VITAMIN B-12) 1000 MCG tablet, Take 1,000 mcg by mouth daily, Disp: , Rfl:   •  diclofenac sodium (VOLTAREN) 50 mg EC tablet, Take 1 tablet (50 mg total) by mouth 3 (three) times a day for 7 days (Patient not taking: Reported on 4/6/2023), Disp: 21 tablet, Rfl: 0  •  docusate sodium (COLACE) 100 mg capsule, Take 1 capsule (100 mg total) by mouth 2 (two) times a day for 15 days (Patient not taking: Reported on 4/6/2023), Disp: 30 capsule, Rfl: 0  •  [START ON 4/10/2023] fluorouracil 1,880 mg in CADD/Elastomeric Infusion Device, Infuse 1,880 mg (225 mg/m2/day x 1 67 m2) into a catheter in a vein via infusion device over 120 hours for 5 days  Infusion planned for April 10, 2023 , Disp: 1 each, Rfl: 0  •  oxybutynin (DITROPAN) 5 mg tablet, Take 1 tablet (5 mg total) by mouth every 6 (six) hours as needed (bladder spasms) (Patient not taking: Reported on 4/6/2023), Disp: 30 tablet, Rfl: 0  •  oxyCODONE (ROXICODONE) 5 immediate release tablet, Take 1 tablet (5 mg total) by mouth every 4 (four) hours as needed for severe pain for up to 5 days Max Daily Amount: 30 mg (Patient not taking: Reported on 4/6/2023), Disp: 5 tablet, Rfl: 0  •  potassium chloride (MICRO-K) 10 MEQ CR capsule, Take 4 capsules (40 mEq total) by mouth 2 (two) times a day for 7 days, Disp: 56 capsule, Rfl: 0  •  Potassium Gluconate 550 MG TABS, Take 1 tablet by mouth in the morning (Patient not taking: Reported on 3/23/2023), Disp: , Rfl:   •  predniSONE 10 mg tablet, Take 2 tablets (20 mg total) by mouth daily, Disp: 120 tablet, Rfl: 0  •  tamsulosin (FLOMAX) 0 4 mg, Take 1 capsule (0 4 mg total) by mouth daily with dinner (Patient not taking: Reported on 4/6/2023), Disp: 15 capsule, Rfl: 0      Allergies   Allergen Reactions   • Oxaliplatin "Shortness Of Breath     Reactions occurred with 2nd and 3rd infusions (about 1-3 hours from initiation of infusion) and required treatment with steroids and antihistamines  Please refer to allergy note on 2/7/2023 for detailed description of her reactions  Social History   Social History     Substance and Sexual Activity   Alcohol Use Never     Social History     Substance and Sexual Activity   Drug Use Never     Social History     Tobacco Use   Smoking Status Never   • Passive exposure: Past   Smokeless Tobacco Never     Family History:   Family History   Problem Relation Age of Onset   • Cancer Mother    • Cirrhosis Father        Review of Systems   Constitutional: Negative  HENT: Negative  Eyes: Negative  Respiratory: Negative  Cardiovascular: Negative  Gastrointestinal: Positive for abdominal pain  Endocrine: Negative  Genitourinary: Negative  Musculoskeletal: Negative  Skin: Negative  Allergic/Immunologic: Negative  Neurological: Negative  Hematological: Negative  Psychiatric/Behavioral: Negative          Objective   Current Vitals:   Vitals:    04/04/23 1111   Weight: 66 7 kg (147 lb)   Height: 5' 2\" (1 575 m)     Physical Exam:  General:no distress, facial changes of steroid therapy  Eyes:perrla/eomi  HEENT:moist mucus membranes  Neck supple  Pulm:no increased work of breathing, clear bilateral  CV:sinus  Abdomen:soft,nontender, diverting colostomy in place  Extremities:no edema  Rectal:normal perianal skin/sphincter tone, no masses palpated  "

## 2023-04-03 NOTE — ANESTHESIA POSTPROCEDURE EVALUATION
Post-Op Assessment Note    CV Status:  Stable    Pain management: adequate     Mental Status:  Somnolent   Hydration Status:  Euvolemic   PONV Controlled:  Controlled   Airway Patency:  Patent      Post Op Vitals Reviewed: Yes      Staff: CRNA         No notable events documented      BP   155/62   Temp   97 4   Pulse  88   Resp   16   SpO2   98

## 2023-04-03 NOTE — H&P
UROLOGY HISTORY AND PHYSICAL     Patient Identifiers: Jacinta Spence (MRN [id number])      Date of Service: 4/3/2023        ASSESSMENT:     56 y o  old female with  impacted chronic right ureteral calculus, s/p first stage URS  PLAN:     RIGHT ureteroscopy, bilateral stent exchange      History of Present Illness:     Jacinta Spence is a 56 y o  old with a history of chronic impacted right ureteral calculus    Past Medical, Past Surgical History:     Past Medical History:   Diagnosis Date   • [address] [address] )    • Colon cancer [address] [address] )    • Fall    • Headache    • Hemochromatosis, unspecified    • History of transfusion     2022 - no adverse reaction   • Kidney calculi    • Kidney stone    • Liver disease     hemangioma   • Muscle weakness    • Personal history of COVID-19 12/2022   • Sarcoidosis    • Seizures ([facility] [address] )     2022   :    Past Surgical History:   Procedure Laterality Date   • ABSCESS DRAINAGE      left breast   • CHEST TUBE INSERTION     • COLON SURGERY      colostomy   • COLONOSCOPY     • FL GUIDED CENTRAL VENOUS ACCESS DEVICE INSERTION  3/21/2023   • FL RETROGRADE PYELOGRAM  1/23/2023   • LUNG BIOPSY     • CT CYSTO/URETERO W/LITHOTRIPSY &INDWELL STENT INSRT Bilateral 1/23/2023    Procedure: CYSTOSCOPY  RIGHT URETEROSCOPY WITH LITHOTRIPSY HOLMIUM LASER, BILATERAL RETROGRADE PYELOGRAM AND BILATERAL  URETERAL STENT INSERTION;  Surgeon: Kelsey Bates MD;  [facility];  Service: Urology   • CT INSJ TUNNELED CTR VAD W/SUBQ PORT AGE 5 YR/> N/A 3/21/2023    Procedure: INSERTION VENOUS PORT ( PORT-A-CATH) IR;  Surgeon: Willem Aquino DO;  [facility];  Service: Interventional Radiology   • TONSILLECTOMY     • URINARY SURGERY Bilateral     bilateral stents   :    Medications, Allergies:     Current Facility-Administered Medications:   •  ceFAZolin (ANCEF) IVPB (premix in dextrose) 1,000 mg 50 mL, 1,000 mg, Intravenous, Once, Matthew Charles PA-C    Allergies:   Allergies "  Allergen Reactions   • Oxaliplatin Shortness Of Breath     Reactions occurred with 2nd and 3rd infusions (about 1-3 hours from initiation of infusion) and required treatment with steroids and antihistamines  Please refer to allergy note on 2/7/2023 for detailed description of her reactions    :    Social and Family History:   Social History:   Social History     Tobacco Use   • Smoking status: Never     Passive exposure: Past   • Smokeless tobacco: Never   Vaping Use   • Vaping Use: Never used   Substance Use Topics   • Alcohol use: Never   • Drug use: Never     Social History     Tobacco Use   Smoking Status Never   • Passive exposure: Past   Smokeless Tobacco Never       Family History:  Family History   Problem Relation Age of Onset   • Cancer Mother    • Cirrhosis Father    :     Review of Systems:     General: Fever, chills, or night sweats: negative  Cardiac: Negative for chest pain  Pulmonary: Negative for shortness of breath  Gastrointestinal: Abdominal pain negative  Nausea, vomiting, or diarrhea negative  Genitourinary: See HPI above  Patient does nothave hematuria  All other systems queried were negative  Physical Exam:   General: Patient is pleasant and in NAD  Awake and alert  /77   Pulse 105   Temp 98 °F (36 7 °C) (Temporal)   Resp 19   Ht 5' 2\" (1 575 m)   Wt 65 8 kg (145 lb)   SpO2 97%   BMI 26 52 kg/m²   HEENT:  Normocephalic atraumatic  Cardiac:  Regular rate and rhythm, Peripheral edema: negative  Pulmonary: Non-labored breathing, CTAB  Abdomen: Soft, non-tender, non-distended  No surgical scars  No masses, tenderness, hernias noted  Genitourinary: negative CVA tenderness, neg suprapubic tenderness    Extremities: normal movement in all 4       Labs:     Lab Results   Component Value Date    HGB 11 4 (L) 03/14/2023    HCT 36 6 03/14/2023    WBC 5 84 03/14/2023     03/14/2023   ]    Lab Results   Component Value Date    K 3 3 (L) 03/14/2023     03/14/2023 " CO2 25 03/14/2023    BUN 14 03/14/2023    CREATININE 0 70 03/14/2023    CALCIUM 8 8 03/14/2023   ]    Imaging:   I personally reviewed the images and report of the following studies, and reviewed them with the patient:        Thank you for allowing me to participate in this patients’ care  Please do not hesitate to call with any additional questions    Cayla Jacinto MD

## 2023-04-03 NOTE — OP NOTE
Operative Note     PATIENT:  Geronimo Diaz (MRN [id number])    DATE OF PROCEDURE:   4/3/2023    PRE-OP DIAGNOSIS:   1) impacted large right ureteral calculus  2  Bilateral indwelling ureteral stents  3  Colorectal cancer receiving neoadjuvant chemotherapy  4  Right ureteral stricture    POST-OP DIAGNOSIS:   1) impacted large right ureteral calculus  2  Bilateral indwelling ureteral stents  3  Colorectal cancer receiving neoadjuvant chemotherapy  4  Right ureteral stricture      PROCEDURES PERFORMED:  1) Cystoscopy  2) Right retrograde pyelography with fluoroscopic interpretation  3) Right ureteroscopy with laser lithotripsy of stone  4) bilateral ureteral stent exchange    SURGEON:  Sarahi Vilchis MD    NOTE:  There were no qualified teaching residents to assist with this case    ANESTHESIA: General     COMPLICATIONS:   None    ANTIBIOTICS:  Ancef    INTRAOPERATIVE THROMBOEMBOLISM PROPHYLAXIS:  Pneumatic compression stockings     FINDINGS:  Findings are concerning for significant stricture disease of the right proximal ureter  Even with the ureteral stent in place for the past 3 months, the proximal ureter appears to be markedly abnormal, pale, stenotic, and potentially containing areas of focal necrosis  I was able to evacuate all visible stone burden within the ureter much of which was embedded submucosally  I did exchange her right sided ureteral stent upsized to a 7 [place], and the same was performed for her left ureteral stent  INDICATIONS FOR PROCEDURE:  Geronimo Diaz is an 56 y o  old female with bilateral proximal ureteral calculi and indwelling ureteral stents  Her urologic history is very nebulous as she was managed in a different state and recently presented through the emergency department to establish care  I evaluated the patient with a CT scan which reveals a bulky right proximal ureteral calculus and a small collection of contralateral left ureteral stones    Patient also has colorectal cancer for which she is receiving neoadjuvant chemotherapy  She is now status post first stage ureteroscopy and presents for her follow-up procedure today  We discussed the procedure in detail, the alternatives, and the risks, and they signed informed consent to proceed  PROCEDURE IN DETAIL:   The patient was identified and brought to the OR  Antibiotic prophylaxis and DVT prophylaxis were administered  They were placed in the comfortable dorsal lithotomy position with care to pad all pressure points  They were prepped and draped in the usual sterile fashion using hibiclens  A surgical time out was performed with all in the room in agreement with the correct patient, procedure, indications, and laterality  A 21-Cymraes rigid cystoscope was used to enter the bladder  The bladder was inspected in its entirety and there were no lesions noted  The ureteral orifices were identified in their normal orthotopic positions  The Right ureteral orifice was identified and a 5 Fr open ended catheter was placed into the ureteral orifice alongside the preplaced ureteral stent which was then removed  A retrograde pyelogram was performed with the findings as described above  A Sensor wire was advanced up to the kidney under fluoroscopic guidance  Leaving this safety wire in place, the bladder was drained  A second working wire was then placed, and over this a 12/14 ureteral access sheath was sequentially passed under fluoroscopic guidance  A  8 5 Cymraes digital flexible ureteroscope was advanced up the ureter under vision   The stone was encountered in the proximal ureter  The stone was noted to be impacted  Given the endoscopic appearance of the right proximal ureter was markedly abnormal   The ureter is markedly stenotic and pale and stone is present essentially circumferentially in a submucosal location  All in all the findings are concerning to me for nonviability of the ureter    I was able to perform a very careful and extensive ureteroscopic removal of all visible stone burden however  A holmium laser fiber was passed through the ureteroscope and laser lithotripsy was commenced at settings of 0 7 J and 7 Hz  The stones were fragmented to very small pieces  Intraoperative photographs were taken prior to and following laser lithotripsy  Following treatment of the stone I was able to advance the endoscope through the area of concern for stricture into the renal pelvis and all calyces were inspected under live fluoroscopic guidance  Patient was grossly stone free at the conclusion the procedure however the appearance of the right proximal ureter remains markedly abnormal     I did perform a targeted retrograde pyelogram through the ureteroscope and confirmed that there was no extravasation or signs of perforation or injury  The ureteroscope was then backed down the ureter  The left ureteral stent was then exchanged in the standard fashion at the conclusion of the case    The bladder was drained  The patient was placed back supine, awakened from general anesthesia and brought to recovery room in stable condition  ESTIMATED BLOOD LOSS:  Minimal      SPECIMENS:   * No orders in the log *     IMPLANTS:   Implant Name Type Inv  Item Serial No   Lot No  LRB No  Used Action   STENT URETERAL 7FR 24CM Tate Luke GDWR - [id number]  STENT URETERAL 7FR 24CM [facility] [id number] Right 1 Implanted   STENT URETERAL 6FR 24CML INLAY [employer]IMA - [id number]  STENT URETERAL 6FR 24CML INLAY OPTIMA  [id number] [address] [id number] Left 1 Implanted        COMPLICATIONS: None    DISPOSITION: PACU    PLAN:  Findings the time of surgery reviewed with the patient in the recovery room  I am very concerned about the viability of her right proximal ureter  He was counseled that she may require  regular ureteral stent exchanges    Certainly I would not attempt a trial without the stent until she has concluded her cancer treatment  She is comfortable maintaining her ureteral stents until that time  I will reach out to her oncology team to determine the duration of systemic therapy and any upcoming surgical interventions and would likely maintain her ureteral stents until that time, tentatively by performing a bilateral ureteral stent exchange in 3 months

## 2023-04-03 NOTE — ANESTHESIA PREPROCEDURE EVALUATION
EF55%, mild MR    Procedure:  CYSTOSCOPY URETEROSCOPY WITH LITHOTRIPSY HOLMIUM LASER, RETROGRADE PYELOGRAM AND INSERTION STENT URETERAL (Left: Bladder)    Relevant Problems   ANESTHESIA (within normal limits)      CARDIO   (+) Chest pain   (+) Nonrheumatic mitral valve regurgitation   (+) Primary hypertension      GI/HEPATIC   (+) Malignant neoplasm of sigmoid colon (HCC)      /RENAL   (+) Kidney calculi   (+) R flank pain with bilateral hydronephrosis      HEMATOLOGY   (+) Other specified anemias   (+) Thrombocytopenia (HCC)      NEURO/PSYCH   (+) History of Clostridium difficile colitis        Physical Exam    Airway    Mallampati score: II  TM Distance: >3 FB  Neck ROM: full     Dental       Cardiovascular  Rate: normal,     Pulmonary  Pulmonary exam normal     Other Findings  Per pt denies anything remaining that is loose or removeable      Anesthesia Plan  ASA Score- 3     Anesthesia Type- general with ASA Monitors  Additional Monitors:   Airway Plan: LMA  Plan Factors-Exercise tolerance (METS): >4 METS  Chart reviewed  Patient summary reviewed  Patient is not a current smoker  Induction- intravenous  Postoperative Plan- Plan for postoperative opioid use  Informed Consent- Anesthetic plan and risks discussed with patient  I personally reviewed this patient with the CRNA  Discussed and agreed on the Anesthesia Plan with the CRNA  Carrie Feliciano

## 2023-04-04 ENCOUNTER — NURSE TRIAGE (OUTPATIENT)
Age: 58
End: 2023-04-04

## 2023-04-04 ENCOUNTER — DOCUMENTATION (OUTPATIENT)
Dept: HEMATOLOGY ONCOLOGY | Facility: CLINIC | Age: 58
End: 2023-04-04

## 2023-04-04 ENCOUNTER — OFFICE VISIT (OUTPATIENT)
Age: 58
End: 2023-04-04

## 2023-04-04 ENCOUNTER — PATIENT OUTREACH (OUTPATIENT)
Age: 58
End: 2023-04-04

## 2023-04-04 ENCOUNTER — PREP FOR PROCEDURE (OUTPATIENT)
Age: 58
End: 2023-04-04

## 2023-04-04 VITALS — WEIGHT: 147 LBS | BODY MASS INDEX: 27.05 KG/M2 | HEIGHT: 62 IN

## 2023-04-04 DIAGNOSIS — C18.7 MALIGNANT NEOPLASM OF SIGMOID COLON (HCC): ICD-10-CM

## 2023-04-04 DIAGNOSIS — C18.7 MALIGNANT NEOPLASM OF SIGMOID COLON (HCC): Primary | ICD-10-CM

## 2023-04-04 NOTE — TELEPHONE ENCOUNTER
DE OV 4/4/2023 malignant neoplasm of sigmoid colon, status post laparoscopic diverting colostomy with liver biopsy with Dr Perri Pierre on 12/1/22, Colonoscopy on 10/28/2022 by Dr Mehreen ZAMORANO DE 4/7 Handed pt information  Additional Information  • Negative: Is this a new patient under the age of 48? • Negative: Is this a patient over the age of 76 that has never had a colonoscopy? • Negative: Is this patient over the age of [de-identified] with a history of cancer and/or polyps? • Negative: Does the patient have a family history of colon or rectal cancer? • Negative: Does the patient require oxygen support? • Negative: Has the patient had a cardiac procedure within the last year? • Negative: Has the patient had coronary artery disease (CAD) with stents or myocardial infarction in the last three months? • Negative: Does the patient have ongoing cardiac ischemia, severe valve dysfunction, or severe reduction of ejection fraction? • Negative: Has the patient had a stroke or blood clots? • Negative: Has the patient had a transient ischemic attack (TIA) or cerebrovascular accident (CVA) in the last three months? • Negative: Is the patient currently on any blood thinners such as Coumadin, Plavix, Eliquis, Pradaxa, Xarelto, etc?  (Check the patient's current medication list and document reason for taking medication)  • Negative: Has the patient had a knee or hip replacement within the last 6 months that required antibiotic coverage prior to the procedure? • Negative:  • Affirmative: Does the patient experience chest pain or shortness of breath when climbing stairs? She takes breaks when using stairs    Advised Patient - Initial Assessment  1  Patient advised that our office requires 72 hours notice for a cancellation of a procedure to avoid incurring a missed appointment fee  2  Asked patient to please contact insurance company to ask how they will be processing the individual claim for the procedure    3  Advised patient that she is welcome to see the doctor in the office prior to the procedure  4  Advised patient to be at the location of the procedure at 30 minutes prior to the scheduled time  Has the patient had a colonoscopy within the last year and when was it? Last colonoscopy date: 12/1/2022  Last colonoscopy results: Malignant neoplasm sigmoid colon  Affirmative: Has the patient had a colonoscopy within the last year and when was it?     Protocols used:  AMB CRC COLONOSCOPY SCHEDULING

## 2023-04-04 NOTE — PROGRESS NOTES
In-basket message received from Roman Nagel RN to add patient to lower GI tumor board on 4/13/2023  Chart reviewed and tumor board prep completed

## 2023-04-04 NOTE — LETTER
Helyn Gosselin  [phone] [facility]  AS OF JUNE 1, 2014, OUR OFFICE REQUIRES 50 HOURS NOTICE OF CANCELLATION/RESCHEDULE OF A PROCEDURE TO AVOID INCURRING A MISSED APPOINTMENT FEE  Your Sigmoidoscopy Procedure has been scheduled at:  [address] [facility]  The Date of your Procedure is:  April 7, 2023   Patient is to have nothing to eat or drink after midnight  Also in preparation for this procedure, take 2 Fleet Enemas 2 hours prior to the appointment  These enemas can be purchased over the counter in most pharmacies  Follow the directions on the box  You are to report to the GI Lab ([facility] 1st Floor) ONE (1) HOUR PRIOR to your procedure  Special instructions may be needed if you are taking aspirin or any aspirin-containing medication  Check with your family physician  Check with your family doctor if you are taking Coumadin (a blood thinner), Plavix, Gingko biloba, Ginseng, Feverfew, and/or St  Al's Wort  We suggest stopping these for 3 days  If you are on DIABETIC MEDICATION (tablets or insulin) your doctor may make changes in your preparation  Take all medications usual unless otherwise instructed  As always, if you have any questions or concerns, feel free to call the office  Our  staff will be happy to connect you with one of the nurses to answer any questions or address any concerns you have regarding your procedure  Arrangements must be made for a responsible party to drive you home from the procedure  If you do not have a responsible family member or friend to drive you home, the procedure will not be done  Vast or a taxi is not acceptable  It is important you notify our office of any insurance changes prior to your procedure and, if necessary, supply us with referrals from your primary care physician

## 2023-04-04 NOTE — PROGRESS NOTES
Hi,     I would recommend RT for this patient  Her initial MRI Pelvis demonstrated a bulky mid-to-high rectal primary below the peritoneal reflection and abutting the uterus anteriorly  Her disease was also quite bulky, so much so that she required diversion  She has had a good initial response to chemotherapy, she remains at risk vulnerable to local recurrence, especially as she likely will not be able to receive full dose further chemotherapy       Best,     Will

## 2023-04-04 NOTE — PROGRESS NOTES
TB rqst sent to GI Onc TB pool to add pt on for lower GI TB meeting on 4/13/23 as a NEW rectal CA pt, to be presented by Dr Sima Wellington

## 2023-04-04 NOTE — PROGRESS NOTES
In person meet w the pt as she had her consult today w Dr Prakash Banerjee  Pt examined and rvw'd CA hx, CA tx that was started in [place] and was resumed here at [facility] ([facility])  The pt was told by CRS that she should undergo a rpt scope that he would like to take a look and see where the position of the rectal mass is located, states that if this is higher up in the rectum the pt could potentially avoid rad therapy and have surgery and chemo to address the liver lesion  Pending bx of the liver to be sched which pt is aware that she will require  Pt is being sched for FS in the office today, asap  I provided the pt w my business card as well as [employer]

## 2023-04-05 ENCOUNTER — TELEPHONE (OUTPATIENT)
Dept: HEMATOLOGY ONCOLOGY | Facility: MEDICAL CENTER | Age: 58
End: 2023-04-05

## 2023-04-05 ENCOUNTER — TELEPHONE (OUTPATIENT)
Dept: HEMATOLOGY ONCOLOGY | Facility: CLINIC | Age: 58
End: 2023-04-05

## 2023-04-05 NOTE — TELEPHONE ENCOUNTER
Spoke with Radha Busch, he took the first date & time and said he would check the rest on MyChart   Will also be receiving an AVS at Dr Ana Briseno appt tomorrow 4/6

## 2023-04-05 NOTE — TELEPHONE ENCOUNTER
I tried calling the patient to remind her of her lab work that needs to be completed prior to her appt  Wrong phone number in patients chart  Will message patient through [address]

## 2023-04-05 NOTE — TELEPHONE ENCOUNTER
RT start date changed to 4/17/2023 per email:      In regards to this mutual patient Ms Yang Officer, Dr Ana Briseno ([address]) is pushing her new start date to Monday 4/17 at 1215  He wants to present her case at Tumor board  This will cause a delay in her pump connection to 4/17 too  I've spoken to the patient as to the adjustment to 4/17 in relation for  Rad Onc and I let her know someone from Med Onc will reach out for time of pump placement on 4/17     Jaqui Shirley        Start of concurrent chemo to be changed to that date  [facility] order updated  Will send to infusion  to rearrange appts and notify patient

## 2023-04-06 ENCOUNTER — APPOINTMENT (OUTPATIENT)
Dept: LAB | Facility: CLINIC | Age: 58
End: 2023-04-06

## 2023-04-06 ENCOUNTER — OFFICE VISIT (OUTPATIENT)
Dept: HEMATOLOGY ONCOLOGY | Facility: CLINIC | Age: 58
End: 2023-04-06

## 2023-04-06 VITALS
HEIGHT: 62 IN | TEMPERATURE: 96.9 F | OXYGEN SATURATION: 97 % | WEIGHT: 146.5 LBS | HEART RATE: 88 BPM | SYSTOLIC BLOOD PRESSURE: 144 MMHG | DIASTOLIC BLOOD PRESSURE: 88 MMHG | BODY MASS INDEX: 26.96 KG/M2 | RESPIRATION RATE: 16 BRPM

## 2023-04-06 DIAGNOSIS — C18.7 MALIGNANT NEOPLASM OF SIGMOID COLON (HCC): ICD-10-CM

## 2023-04-06 DIAGNOSIS — R79.89 ABNORMAL LFTS: ICD-10-CM

## 2023-04-06 DIAGNOSIS — C20 RECTAL CANCER (HCC): ICD-10-CM

## 2023-04-06 DIAGNOSIS — E87.6 HYPOKALEMIA: ICD-10-CM

## 2023-04-06 LAB
ANION GAP SERPL CALCULATED.3IONS-SCNC: 9 MMOL/L (ref 4–13)
BUN SERPL-MCNC: 19 MG/DL (ref 5–25)
CALCIUM SERPL-MCNC: 9.5 MG/DL (ref 8.4–10.2)
CEA SERPL-MCNC: 1.1 NG/ML (ref 0–3)
CHLORIDE SERPL-SCNC: 103 MMOL/L (ref 96–108)
CO2 SERPL-SCNC: 29 MMOL/L (ref 21–32)
CREAT SERPL-MCNC: 0.77 MG/DL (ref 0.6–1.3)
GFR SERPL CREATININE-BSD FRML MDRD: 87 ML/MIN/1.73SQ M
GLUCOSE SERPL-MCNC: 151 MG/DL (ref 65–140)
POTASSIUM SERPL-SCNC: 2.9 MMOL/L (ref 3.5–5.3)
SODIUM SERPL-SCNC: 141 MMOL/L (ref 135–147)

## 2023-04-06 NOTE — H&P (VIEW-ONLY)
Chapincito Lima  1965  [address] [facility]  [address] [facility] [id number]     DISCUSSION/SUMMARY:    16-year-old female recently diagnosed with rectal cancer  Issues:    Rectal cancer  Clinical stage from another institution is cT3 cN0 cM0  Patient required upfront diverting procedure - reason not entirely clear (patient did not know), presumed pain, bleeding, obstruction  Mrs Cade Garnica apparently was able to tolerate the first cycle of CapeOX but developed a severe reaction on the second cycle  There are notes in Care Everywhere where the plan at [facility] was to desensitize the patient to oxaliplatin  Case recently discussed with Dr Gonzales Christianson, radiation oncology  The plan is to move forward with long course chemo RT as long as there is no evidence for metastases  Port is in place  Recent staging rectal MRI did not demonstrate any concerning abnormalities, no evidence of adenopathy  Unfortunately the recent CAT scan demonstrated a questionable liver lesion; dedicated MRI suggested  Patient reportedly has a history of hemochromatosis but this has not been verified  Blood work is pending  The concern is metastatic disease versus a second primary  The plan was for the patient to be presented at the GI tumor board; I believe next week  I will speak with Dr Saurabh Sanchez again  The liver lesion need better clarification before beginning concurrent treatment  NCCN guidelines 4 2022 state that for patients with T3, N any with clear CRM by MRI, neoadjuvant options include the standard chemotherapy for 3 to 4 months, long course chemo RT and then restaging with evaluation for resection  Another option includes upfront long course chemo RT with either capecitabine or fusion or 5-FU  Sarcoidosis  This is a relatively new diagnosis  No significant respiratory issues at this time  Patient is on a prednisone taper  Patient is being followed by pulmonary  Concern for hemochromatosis (pulmonary note from March 7, 2023)  HFE gene mutation analysis requested - still not drawn - etiology not clear  This has been reordered - same with the CEA level  Nephrolithiasis, ureteral obstruction  Patient follows up with   Patient is to return in 3 weeks but this may change depending upon the above  Mrs Arely Gallardo knows to call the hematology/oncology office if there are any other questions or concerns  Carefully review your medication list and verify that the list is accurate and up-to-date  Please call the hematology/oncology office if there are medications missing from the list, medications on the list that you are not currently taking or if there is a dosage or instruction that is different from how you're taking that medication  Patient goals and areas of care: Clarify stage, clarify no evidence of disease progression, speak with radiation oncology  Barriers to care: none  Patient is able to self-care   _____________________________________________________________________________________    Chief Complaint   Patient presents with   • Follow-up     Malignant neoplasm of sigmoid colon      Oncology History   Malignant neoplasm of sigmoid colon ([facility] [address] )   10/28/2022 Biopsy    Colon, Rectosigmoid Colon Mass Biopsy (1 slide [address] , collected 10/28/2022):  - Adenocarcinoma arising in a tubular adenoma     12/1/2022 Biopsy    DIAGNOSIS LIVER, SEGMENT 3, RESECTION:  - HYALINIZED SUBCAPSULAR AND INTRA-PARENCHYMAL NODULES  NO MALIGNANCY SEEN  SEE NOTE  - MILD MACROVESICULAR STEATOSIS (25%)  NO EVIDENCE OF STEATOHEPATITIS  TRICHOME STAIN SHOWS MILD PORTAL FIBROSIS    - 2+ IRON DEPOSITION WITHIN KUPFFER CELLS (IRON STAIN), CONSISTENT WITH SECONDARY HEMOCHROMATOSIS  NOTE: THIS LIKELY REPRESENTS A MARKEDLY SCLEROTIC HEMANGIOMA OR CHANGES SECONDARY TO INJURY                 12/1/2022 Surgery Laparoscopic diverting colostomy with liver biopsy  Dr Conrad Hurt       12/25/2022 Initial Diagnosis    Malignant neoplasm of sigmoid colon ([facility] [address] )     2022 - 1/10/2023 Chemotherapy    Oxaliplatin  [address]     2/24/2023 -  Cancer Staged    Staging form: Colon and Rectum, AJCC 8th Edition  - Clinical: Stage IIA (cT3, cN0, cM0) - Signed by David Castorena MD on 2/24/2023  Total positive nodes: 0       4/17/2023 -  Chemotherapy    fluorouracil (ADRUCIL) ambulatory infusion Soln, 225 mg/m2/day = 1,880 mg, Intravenous, Over 120 hours, 0 of 5 cycles       History of Present Illness: 81-year-old female recently diagnosed with rectal adenocarcinoma recently moving into [place] and in need of a new medical oncologist   Patient has a complicated cancer history; first seen just a few weeks ago  Patient returns for follow-up  Patient was diagnosed with cT3 cN0 cM0 rectal adenocarcinoma in the [facility]  Although the specifics are not clear, patient required a diverting ileostomy  Patient was then started on neoadjuvant CapeOx  Patient was able to get through the first cycle but then had a reaction on the second cycle  At that time, Mrs Ronaldo Cantor and her  decided to move to [place]  Patient was treated somewhat with the Xeloda only but has not been treated for 2 months  Patient also has a history of sarcoidosis, on steroids (being tapered)  Presently patient states feeling okay, about the same as before  Ostomy is working well, no nausea or vomiting, no abdominal pain, no GI bleeding  Patient still occasionally passes a small amount of mucus/stool rectally  No respiratory issues  No  or GYN problems  No fevers or signs of infection  No pain control issues  Review of Systems   Constitutional: Positive for activity change, appetite change and fatigue  HENT: Negative  Eyes: Negative  Respiratory: Negative  Cardiovascular: Negative  Gastrointestinal: Negative  Endocrine: Negative  Genitourinary: Negative  Musculoskeletal: Negative  Skin: Negative  Allergic/Immunologic: Negative  Neurological: Negative  Hematological: Negative  Psychiatric/Behavioral: Negative  All other systems reviewed and are negative        Patient Active Problem List   Diagnosis   • Malignant neoplasm of sigmoid colon ([facility] )   • History of Clostridium difficile colitis   • Other hemochromatosis   • Primary hypertension   • Sarcoidosis   • Impaired fasting glucose   • Hypokalemia   • Transaminitis   • R flank pain with bilateral hydronephrosis   • Adrenal nodule (HCC)   • Bleeding from colostomy stoma [facility])   • Other specified anemias   • Thrombocytopenia (HCC)   • Nonrheumatic mitral valve regurgitation   • Advance directive discussed with patient   • Gross hematuria   • Skin lesion   • Abnormal CT of the chest   • Kidney calculi   • Dysuria   • Chest pain   • Low TSH level   • Healthcare maintenance     Past Medical History:   Diagnosis Date   • Cancer [facility])    • Colon cancer ([place] [address] )    • Fall    • Headache    • Hemochromatosis, unspecified    • History of transfusion     2022 - no adverse reaction   • Kidney calculi    • Kidney stone    • Liver disease     hemangioma   • Muscle weakness    • Personal history of COVID-19 12/2022   • Sarcoidosis    • Seizures ([place] [address] )     2022     Past Surgical History:   Procedure Laterality Date   • ABSCESS DRAINAGE      left breast   • CHEST TUBE INSERTION     • COLON SURGERY      colostomy   • COLONOSCOPY     • FL GUIDED CENTRAL VENOUS ACCESS DEVICE INSERTION  3/21/2023   • FL RETROGRADE PYELOGRAM  1/23/2023   • LUNG BIOPSY     • RI CYSTO/URETERO W/LITHOTRIPSY &INDWELL STENT INSRT Bilateral 1/23/2023    Procedure: CYSTOSCOPY  RIGHT URETEROSCOPY WITH LITHOTRIPSY HOLMIUM LASER, BILATERAL RETROGRADE PYELOGRAM AND BILATERAL  URETERAL STENT INSERTION;  Surgeon: Swati Miller MD;  Location: [facility] Main OR;  Service: Urology   • DE CYSTO/URETERO W/LITHOTRIPSY &INDWELL STENT INSRT Right 4/3/2023    Procedure: RIGHT URETEROSCOPY WITH LITHOTRIPSY HOLMIUM LASER, RETROGRADE PYELOGRAM; BILATERAL EXCHANGE STENT URETERAL;  Surgeon: Sudhakar Freeman MD;  Location: [facility] Main OR;  Service: Urology   • DE INSJ TUNNELED CTR VAD W/SUBQ PORT AGE 5 YR/> N/A 3/21/2023    Procedure: INSERTION VENOUS PORT ( PORT-A-CATH) IR;  Surgeon: Bharat Maurer DO;  Location: [facility] MAIN OR;  Service: Interventional Radiology   • TONSILLECTOMY     • URINARY SURGERY Bilateral     bilateral stents     Family History   Problem Relation Age of Onset   • Cancer Mother    • Cirrhosis Father      Social History     Socioeconomic History   • Marital status:      Spouse name: Not on file   • Number of children: Not on file   • Years of education: Not on file   • Highest education level: Not on file   Occupational History   • Not on file   Tobacco Use   • Smoking status: Never     Passive exposure: Past   • Smokeless tobacco: Never   Vaping Use   • Vaping Use: Never used   Substance and Sexual Activity   • Alcohol use: Never   • Drug use: Never   • Sexual activity: Not Currently   Other Topics Concern   • Not on file   Social History Narrative   • Not on file     Social Determinants of Health     Financial Resource Strain: Not on file   Food Insecurity: No Food Insecurity   • Worried About Running Out of Food in the Last Year: Never true   • Ran Out of Food in the Last Year: Never true   Transportation Needs: No Transportation Needs   • Lack of Transportation (Medical): No   • Lack of Transportation (Non-Medical):  No   Physical Activity: Not on file   Stress: Not on file   Social Connections: Not on file   Intimate Partner Violence: Not on file   Housing Stability: Low Risk    • Unable to Pay for Housing in the Last Year: No   • Number of Places Lived in the Last Year: 1   • Unstable Housing in the Last Year: No       Current Outpatient Medications:   • cyanocobalamin (VITAMIN B-12) 1000 MCG tablet, Take 1,000 mcg by mouth daily, Disp: , Rfl:   •  [START ON 4/10/2023] fluorouracil 1,880 mg in CADD/Elastomeric Infusion Device, Infuse 1,880 mg (225 mg/m2/day x 1 67 m2) into a catheter in a vein via infusion device over 120 hours for 5 days  Infusion planned for April 10, 2023 , Disp: 1 each, Rfl: 0  •  predniSONE 10 mg tablet, Take 2 tablets (20 mg total) by mouth daily, Disp: 120 tablet, Rfl: 0  •  acetaminophen (TYLENOL) 325 mg tablet, Take 650 mg by mouth every 6 (six) hours as needed for mild pain (Patient not taking: Reported on 4/6/2023), Disp: , Rfl:   •  acetaminophen (TYLENOL) 325 mg tablet, Take 2 tablets (650 mg total) by mouth every 4 (four) hours as needed for mild pain (Patient not taking: Reported on 4/6/2023), Disp: 30 tablet, Rfl: 0  •  amLODIPine (NORVASC) 5 mg tablet, Take 5 mg by mouth daily (Patient not taking: Reported on 3/13/2023), Disp: , Rfl:   •  CAPECITABINE PO, Take 1,650 mg by mouth 2 (two) times a day (Patient not taking: Reported on 3/17/2023), Disp: , Rfl:   •  diclofenac sodium (VOLTAREN) 50 mg EC tablet, Take 1 tablet (50 mg total) by mouth 3 (three) times a day for 7 days (Patient not taking: Reported on 4/6/2023), Disp: 21 tablet, Rfl: 0  •  docusate sodium (COLACE) 100 mg capsule, Take 1 capsule (100 mg total) by mouth 2 (two) times a day for 15 days (Patient not taking: Reported on 4/6/2023), Disp: 30 capsule, Rfl: 0  •  oxybutynin (DITROPAN) 5 mg tablet, Take 1 tablet (5 mg total) by mouth every 6 (six) hours as needed (bladder spasms) (Patient not taking: Reported on 4/6/2023), Disp: 30 tablet, Rfl: 0  •  oxyCODONE (ROXICODONE) 5 immediate release tablet, Take 1 tablet (5 mg total) by mouth every 4 (four) hours as needed for severe pain for up to 5 days Max Daily Amount: 30 mg (Patient not taking: Reported on 4/6/2023), Disp: 5 tablet, Rfl: 0  •  phenazopyridine (PYRIDIUM) 200 mg tablet, Take 1 tablet (200 mg total) by mouth 3 (three) times a day as needed (Pain with urination) for up to 3 days (Patient not taking: Reported on 4/6/2023), Disp: 10 tablet, Rfl: 0  •  potassium chloride (MICRO-K) 10 MEQ CR capsule, Take 4 capsules (40 mEq total) by mouth 2 (two) times a day for 7 days, Disp: 56 capsule, Rfl: 0  •  Potassium Gluconate 550 MG TABS, Take 1 tablet by mouth in the morning (Patient not taking: Reported on 3/23/2023), Disp: , Rfl:   •  tamsulosin (FLOMAX) 0 4 mg, Take 1 capsule (0 4 mg total) by mouth daily with dinner (Patient not taking: Reported on 4/6/2023), Disp: 15 capsule, Rfl: 0    Allergies   Allergen Reactions   • Oxaliplatin Shortness Of Breath     Reactions occurred with 2nd and 3rd infusions (about 1-3 hours from initiation of infusion) and required treatment with steroids and antihistamines  Please refer to allergy note on 2/7/2023 for detailed description of her reactions  Vitals:    04/06/23 1122   BP: 144/88   Pulse: 88   Resp: 16   Temp: (!) 96 9 °F (36 1 °C)   SpO2: 97%     Physical Exam  Constitutional:       Appearance: She is well-developed  HENT:      Head: Normocephalic and atraumatic  Right Ear: External ear normal       Left Ear: External ear normal    Eyes:      Conjunctiva/sclera: Conjunctivae normal       Pupils: Pupils are equal, round, and reactive to light  Cardiovascular:      Rate and Rhythm: Normal rate and regular rhythm  Heart sounds: Normal heart sounds  Pulmonary:      Effort: Pulmonary effort is normal       Breath sounds: Normal breath sounds  Comments: Clear bilaterally  Abdominal:      General: Bowel sounds are normal       Palpations: Abdomen is soft  Comments: + Bowel sounds, left upper quadrant ostomy in place, nontender, distended, no fluid, no rigidity or rebound   Musculoskeletal:         General: Normal range of motion  Cervical back: Normal range of motion and neck supple  Skin:     General: Skin is warm        Comments: Relatively good color, warm, moist, no petechiae or ecchymoses   Neurological:      Mental Status: She is alert and oriented to person, place, and time  Deep Tendon Reflexes: Reflexes are normal and symmetric  Psychiatric:         Behavior: Behavior normal          Thought Content: Thought content normal          Judgment: Judgment normal      Extremities: No adenopathy in the neck, supraclavicular chain, axilla bilaterally  Lymphatics: no adenopathy in the neck, supraclavicular region, axilla and groin bilaterally    Performance Status: 1 - Symptomatic but completely ambulatory    Labs    3/14/2023 WBC = 5 84 hemoglobin = 11 4 hematocrit = 36 6 MCV = 100 platelet = 185  2/32/0730 potassium = 2 8 BUN = 14 creatinine = 0 82 glucose = 293 calcium = 9 0 AST = 49 ALT = 58 alkaline phosphatase = 366 total protein = 6 2 total bilirubin = 1 3    Imaging    3/18/2023 CAT scan chest abdomen pelvis with contrast    IMPRESSION:     1  Probably right upper lobe interstitial opacities are nonspecific and could be due to infection or inflammation of the appropriate clinical context or fibrosis  Malignancy is felt to be less likely but not excluded  May consider short-term follow-up imaging in 2-3 months for reassessment  2   Consolidative changes in the left lower lobe and inferior lingula may be due to atelectasis  3   1 6 cm heterogeneous nodule the right lobe of the thyroid gland, recommend correlation with thyroid ultrasound if not obtained within the prior 6 months  4   There is an ill-defined area of hypoattenuation within the inferior right hepatic lobe of uncertain etiology  Not appreciated on the previous exam   Consider further assessment with contrast-enhanced MRI  5   Bilateral ureteral stents with calcifications suspected along the proximal right ureteral stent few scattered calcifications along the mid to distal left ureteral stent    6   Additional findings discussed above        3/17/2023 MRI pelvics rectal cancer staging    IMPRESSION:  Unenhanced MRI of the Pelvis (Rectal Protocol)      SINCE MRI PELVIS RECTAL CANCER STAGING DATED 11/1/2022, POST TREATMENT PRIMARY TUMOR ASSESSMENT: Incomplete response (likely residual tumor ) Please note, though incomplete, there has been substantial tumor response to treatment with a significant   decrease in size of the high rectal cancer since MRI 11/1/2022       SUSPICIOUS MESORECTAL LYMPH NODES: No      SUSPICIOUS EXTRAMESORECTAL LYMPH NODES: No            2/11/2023 CAT scan abdomen pelvis with contrast    IMPRESSION:     Interval decrease in right-sided hydronephrosis      Thickening of the ascending colon and cecum which may represent colitis  Follow-up with gastroenterology      Stable 1 cm right adrenal gland nodule  Although its imaging features are indeterminate, it does not have suspicious imaging features (heterogeneity, necrosis, irregular margins), therefore this is likely benign, and can be followed by non-contrast abdomen CT or MRI in 12 months  If patient has history of adrenal hyperfunction, consider biochemical evaluation  Pathology    Case Report   Surgical Pathology Report                         Case: [id number]                                    Authorizing Provider: Tano Sandy MD           Collected:           02/22/2023 0731               Ordering Location:     [address]      Received:            02/22/2023 [address] [place] [address] Specialty                                                                                   Laboratory                                                                    Pathologist:           Ag Chisholm MD                                                                  Specimen:    Colon, Rectosigmoid Colon Mass Biopsy (1 slide [id number] [facility], collected 10/28/2022)                                                              Final Diagnosis   A  Colon, Rectosigmoid Colon Mass Biopsy (1 slide [address] , collected 10/28/2022):  - Adenocarcinoma arising in a tubular adenoma      Note: Per outside report (slides were not available for review)       RESULTS OF IMMUNOHISTOCHEMICAL ANALYSIS FOR MISMATCH REPAIR PROTEIN LOSS     RESULTS:  Antibody            Clone                 Description                     Results  MLH1                 M1                     Mismatch repair protein  Intact nuclear expression  MSH2                [id number]        Mismatch repair protein  Intact nuclear expression  MSH6                SP93                  Mismatch repair protein  Intact nuclear expression  PMS2                 A16-4                 Mismatch repair protein  Intact nuclear expression      Electronically signed by Joseph Hernández MD on 2/22/2023 at 10:11 AM

## 2023-04-06 NOTE — PROGRESS NOTES
Julio Cesar Rodriguez  1965  [address] [facility]  [address]   [place] [id number]     DISCUSSION/SUMMARY:    74-year-old female recently diagnosed with rectal cancer  Issues:    Rectal cancer  Clinical stage from another institution is cT3 cN0 cM0  Patient required upfront diverting procedure - reason not entirely clear (patient did not know), presumed pain, bleeding, obstruction  Mrs Reece Bateman apparently was able to tolerate the first cycle of CapeOX but developed a severe reaction on the second cycle  There are notes in Care Everywhere where the plan at [facility] was to desensitize the patient to oxaliplatin  Case recently discussed with Dr Julian Bui, radiation oncology  The plan is to move forward with long course chemo RT as long as there is no evidence for metastases  Port is in place  Recent staging rectal MRI did not demonstrate any concerning abnormalities, no evidence of adenopathy  Unfortunately the recent CAT scan demonstrated a questionable liver lesion; dedicated MRI suggested  Patient reportedly has a history of hemochromatosis but this has not been verified  Blood work is pending  The concern is metastatic disease versus a second primary  The plan was for the patient to be presented at the GI tumor board; I believe next week  I will speak with Dr Lennox Nixon again  The liver lesion need better clarification before beginning concurrent treatment  NCCN guidelines 4 2022 state that for patients with T3, N any with clear CRM by MRI, neoadjuvant options include the standard chemotherapy for 3 to 4 months, long course chemo RT and then restaging with evaluation for resection  Another option includes upfront long course chemo RT with either capecitabine or fusion or 5-FU  Sarcoidosis  This is a relatively new diagnosis  No significant respiratory issues at this time  Patient is on a prednisone taper  Patient is being followed by pulmonary  Concern for hemochromatosis (pulmonary note from March 7, 2023)  HFE gene mutation analysis requested - still not drawn - etiology not clear  This has been reordered - same with the CEA level  Nephrolithiasis, ureteral obstruction  Patient follows up with   Patient is to return in 3 weeks but this may change depending upon the above  Mrs Juwan Fragoso knows to call the hematology/oncology office if there are any other questions or concerns  Carefully review your medication list and verify that the list is accurate and up-to-date  Please call the hematology/oncology office if there are medications missing from the list, medications on the list that you are not currently taking or if there is a dosage or instruction that is different from how you're taking that medication  Patient goals and areas of care: Clarify stage, clarify no evidence of disease progression, speak with radiation oncology  Barriers to care: none  Patient is able to self-care   _____________________________________________________________________________________    Chief Complaint   Patient presents with   • Follow-up     Malignant neoplasm of sigmoid colon      Oncology History   Malignant neoplasm of sigmoid colon ([facility] [address] )   10/28/2022 Biopsy    Colon, Rectosigmoid Colon Mass Biopsy (1 slide [address] , collected 10/28/2022):  - Adenocarcinoma arising in a tubular adenoma     12/1/2022 Biopsy    DIAGNOSIS LIVER, SEGMENT 3, RESECTION:  - HYALINIZED SUBCAPSULAR AND INTRA-PARENCHYMAL NODULES  NO MALIGNANCY SEEN  SEE NOTE  - MILD MACROVESICULAR STEATOSIS (25%)  NO EVIDENCE OF STEATOHEPATITIS  TRICHOME STAIN SHOWS MILD PORTAL FIBROSIS    - 2+ IRON DEPOSITION WITHIN KUPFFER CELLS (IRON STAIN), CONSISTENT WITH SECONDARY HEMOCHROMATOSIS  NOTE: THIS LIKELY REPRESENTS A MARKEDLY SCLEROTIC HEMANGIOMA OR CHANGES SECONDARY TO INJURY                 12/1/2022 Surgery Laparoscopic diverting colostomy with liver biopsy  Dr Nadiya Romo       12/25/2022 Initial Diagnosis    Malignant neoplasm of sigmoid colon ([facility] [address] )     2022 - 1/10/2023 Chemotherapy    Oxaliplatin  [address]     2/24/2023 -  Cancer Staged    Staging form: Colon and Rectum, AJCC 8th Edition  - Clinical: Stage IIA (cT3, cN0, cM0) - Signed by Kristel Mooney MD on 2/24/2023  Total positive nodes: 0       4/17/2023 -  Chemotherapy    fluorouracil (ADRUCIL) ambulatory infusion Soln, 225 mg/m2/day = 1,880 mg, Intravenous, Over 120 hours, 0 of 5 cycles       History of Present Illness: 16-year-old female recently diagnosed with rectal adenocarcinoma recently moving into [facility] and in need of a new medical oncologist   Patient has a complicated cancer history; first seen just a few weeks ago  Patient returns for follow-up  Patient was diagnosed with cT3 cN0 cM0 rectal adenocarcinoma in the [facility]  Although the specifics are not clear, patient required a diverting ileostomy  Patient was then started on neoadjuvant CapeOx  Patient was able to get through the first cycle but then had a reaction on the second cycle  At that time, Mrs Jonathan Callahan and her  decided to move to [place]  Patient was treated somewhat with the Xeloda only but has not been treated for 2 months  Patient also has a history of sarcoidosis, on steroids (being tapered)  Presently patient states feeling okay, about the same as before  Ostomy is working well, no nausea or vomiting, no abdominal pain, no GI bleeding  Patient still occasionally passes a small amount of mucus/stool rectally  No respiratory issues  No  or GYN problems  No fevers or signs of infection  No pain control issues  Review of Systems   Constitutional: Positive for activity change, appetite change and fatigue  HENT: Negative  Eyes: Negative  Respiratory: Negative  Cardiovascular: Negative  Gastrointestinal: Negative  Endocrine: Negative  Genitourinary: Negative  Musculoskeletal: Negative  Skin: Negative  Allergic/Immunologic: Negative  Neurological: Negative  Hematological: Negative  Psychiatric/Behavioral: Negative  All other systems reviewed and are negative        Patient Active Problem List   Diagnosis   • Malignant neoplasm of sigmoid colon ([facility] )   • History of Clostridium difficile colitis   • Other hemochromatosis   • Primary hypertension   • Sarcoidosis   • Impaired fasting glucose   • Hypokalemia   • Transaminitis   • R flank pain with bilateral hydronephrosis   • Adrenal nodule (HCC)   • Bleeding from colostomy stoma [facility])   • Other specified anemias   • Thrombocytopenia (HCC)   • Nonrheumatic mitral valve regurgitation   • Advance directive discussed with patient   • Gross hematuria   • Skin lesion   • Abnormal CT of the chest   • Kidney calculi   • Dysuria   • Chest pain   • Low TSH level   • Healthcare maintenance     Past Medical History:   Diagnosis Date   • Cancer [facility])    • Colon cancer ([place] [address] )    • Fall    • Headache    • Hemochromatosis, unspecified    • History of transfusion     2022 - no adverse reaction   • Kidney calculi    • Kidney stone    • Liver disease     hemangioma   • Muscle weakness    • Personal history of COVID-19 12/2022   • Sarcoidosis    • Seizures ([place] [address] )     2022     Past Surgical History:   Procedure Laterality Date   • ABSCESS DRAINAGE      left breast   • CHEST TUBE INSERTION     • COLON SURGERY      colostomy   • COLONOSCOPY     • FL GUIDED CENTRAL VENOUS ACCESS DEVICE INSERTION  3/21/2023   • FL RETROGRADE PYELOGRAM  1/23/2023   • LUNG BIOPSY     • LA CYSTO/URETERO W/LITHOTRIPSY &INDWELL STENT INSRT Bilateral 1/23/2023    Procedure: CYSTOSCOPY  RIGHT URETEROSCOPY WITH LITHOTRIPSY HOLMIUM LASER, BILATERAL RETROGRADE PYELOGRAM AND BILATERAL  URETERAL STENT INSERTION;  Surgeon: Eilleen Skiff, MD;  Location: [facility] Main OR;  Service: Urology   • LA CYSTO/URETERO W/LITHOTRIPSY &INDWELL STENT INSRT Right 4/3/2023    Procedure: RIGHT URETEROSCOPY WITH LITHOTRIPSY HOLMIUM LASER, RETROGRADE PYELOGRAM; BILATERAL EXCHANGE STENT URETERAL;  Surgeon: Ozzy Myers MD;  Location: [facility] Main OR;  Service: Urology   • LA INSJ TUNNELED CTR VAD W/SUBQ PORT AGE 5 YR/> N/A 3/21/2023    Procedure: INSERTION VENOUS PORT ( PORT-A-CATH) IR;  Surgeon: Ramana Hi DO;  Location: [facility] MAIN OR;  Service: Interventional Radiology   • TONSILLECTOMY     • URINARY SURGERY Bilateral     bilateral stents     Family History   Problem Relation Age of Onset   • Cancer Mother    • Cirrhosis Father      Social History     Socioeconomic History   • Marital status:      Spouse name: Not on file   • Number of children: Not on file   • Years of education: Not on file   • Highest education level: Not on file   Occupational History   • Not on file   Tobacco Use   • Smoking status: Never     Passive exposure: Past   • Smokeless tobacco: Never   Vaping Use   • Vaping Use: Never used   Substance and Sexual Activity   • Alcohol use: Never   • Drug use: Never   • Sexual activity: Not Currently   Other Topics Concern   • Not on file   Social History Narrative   • Not on file     Social Determinants of Health     Financial Resource Strain: Not on file   Food Insecurity: No Food Insecurity   • Worried About Running Out of Food in the Last Year: Never true   • Ran Out of Food in the Last Year: Never true   Transportation Needs: No Transportation Needs   • Lack of Transportation (Medical): No   • Lack of Transportation (Non-Medical):  No   Physical Activity: Not on file   Stress: Not on file   Social Connections: Not on file   Intimate Partner Violence: Not on file   Housing Stability: Low Risk    • Unable to Pay for Housing in the Last Year: No   • Number of Places Lived in the Last Year: 1   • Unstable Housing in the Last Year: No       Current Outpatient Medications:   • cyanocobalamin (VITAMIN B-12) 1000 MCG tablet, Take 1,000 mcg by mouth daily, Disp: , Rfl:   •  [START ON 4/10/2023] fluorouracil 1,880 mg in CADD/Elastomeric Infusion Device, Infuse 1,880 mg (225 mg/m2/day x 1 67 m2) into a catheter in a vein via infusion device over 120 hours for 5 days  Infusion planned for April 10, 2023 , Disp: 1 each, Rfl: 0  •  predniSONE 10 mg tablet, Take 2 tablets (20 mg total) by mouth daily, Disp: 120 tablet, Rfl: 0  •  acetaminophen (TYLENOL) 325 mg tablet, Take 650 mg by mouth every 6 (six) hours as needed for mild pain (Patient not taking: Reported on 4/6/2023), Disp: , Rfl:   •  acetaminophen (TYLENOL) 325 mg tablet, Take 2 tablets (650 mg total) by mouth every 4 (four) hours as needed for mild pain (Patient not taking: Reported on 4/6/2023), Disp: 30 tablet, Rfl: 0  •  amLODIPine (NORVASC) 5 mg tablet, Take 5 mg by mouth daily (Patient not taking: Reported on 3/13/2023), Disp: , Rfl:   •  CAPECITABINE PO, Take 1,650 mg by mouth 2 (two) times a day (Patient not taking: Reported on 3/17/2023), Disp: , Rfl:   •  diclofenac sodium (VOLTAREN) 50 mg EC tablet, Take 1 tablet (50 mg total) by mouth 3 (three) times a day for 7 days (Patient not taking: Reported on 4/6/2023), Disp: 21 tablet, Rfl: 0  •  docusate sodium (COLACE) 100 mg capsule, Take 1 capsule (100 mg total) by mouth 2 (two) times a day for 15 days (Patient not taking: Reported on 4/6/2023), Disp: 30 capsule, Rfl: 0  •  oxybutynin (DITROPAN) 5 mg tablet, Take 1 tablet (5 mg total) by mouth every 6 (six) hours as needed (bladder spasms) (Patient not taking: Reported on 4/6/2023), Disp: 30 tablet, Rfl: 0  •  oxyCODONE (ROXICODONE) 5 immediate release tablet, Take 1 tablet (5 mg total) by mouth every 4 (four) hours as needed for severe pain for up to 5 days Max Daily Amount: 30 mg (Patient not taking: Reported on 4/6/2023), Disp: 5 tablet, Rfl: 0  •  phenazopyridine (PYRIDIUM) 200 mg tablet, Take 1 tablet (200 mg total) by mouth 3 (three) times a day as needed (Pain with urination) for up to 3 days (Patient not taking: Reported on 4/6/2023), Disp: 10 tablet, Rfl: 0  •  potassium chloride (MICRO-K) 10 MEQ CR capsule, Take 4 capsules (40 mEq total) by mouth 2 (two) times a day for 7 days, Disp: 56 capsule, Rfl: 0  •  Potassium Gluconate 550 MG TABS, Take 1 tablet by mouth in the morning (Patient not taking: Reported on 3/23/2023), Disp: , Rfl:   •  tamsulosin (FLOMAX) 0 4 mg, Take 1 capsule (0 4 mg total) by mouth daily with dinner (Patient not taking: Reported on 4/6/2023), Disp: 15 capsule, Rfl: 0    Allergies   Allergen Reactions   • Oxaliplatin Shortness Of Breath     Reactions occurred with 2nd and 3rd infusions (about 1-3 hours from initiation of infusion) and required treatment with steroids and antihistamines  Please refer to allergy note on 2/7/2023 for detailed description of her reactions  Vitals:    04/06/23 1122   BP: 144/88   Pulse: 88   Resp: 16   Temp: (!) 96 9 °F (36 1 °C)   SpO2: 97%     Physical Exam  Constitutional:       Appearance: She is well-developed  HENT:      Head: Normocephalic and atraumatic  Right Ear: External ear normal       Left Ear: External ear normal    Eyes:      Conjunctiva/sclera: Conjunctivae normal       Pupils: Pupils are equal, round, and reactive to light  Cardiovascular:      Rate and Rhythm: Normal rate and regular rhythm  Heart sounds: Normal heart sounds  Pulmonary:      Effort: Pulmonary effort is normal       Breath sounds: Normal breath sounds  Comments: Clear bilaterally  Abdominal:      General: Bowel sounds are normal       Palpations: Abdomen is soft  Comments: + Bowel sounds, left upper quadrant ostomy in place, nontender, distended, no fluid, no rigidity or rebound   Musculoskeletal:         General: Normal range of motion  Cervical back: Normal range of motion and neck supple  Skin:     General: Skin is warm        Comments: Relatively good color, warm, moist, no petechiae or ecchymoses   Neurological:      Mental Status: She is alert and oriented to person, place, and time  Deep Tendon Reflexes: Reflexes are normal and symmetric  Psychiatric:         Behavior: Behavior normal          Thought Content: Thought content normal          Judgment: Judgment normal      Extremities: No adenopathy in the neck, supraclavicular chain, axilla bilaterally  Lymphatics: no adenopathy in the neck, supraclavicular region, axilla and groin bilaterally    Performance Status: 1 - Symptomatic but completely ambulatory    Labs    3/14/2023 WBC = 5 84 hemoglobin = 11 4 hematocrit = 36 6 MCV = 100 platelet = 989  0/59/8819 potassium = 2 8 BUN = 14 creatinine = 0 82 glucose = 293 calcium = 9 0 AST = 49 ALT = 58 alkaline phosphatase = 366 total protein = 6 2 total bilirubin = 1 3    Imaging    3/18/2023 CAT scan chest abdomen pelvis with contrast    IMPRESSION:     1  Probably right upper lobe interstitial opacities are nonspecific and could be due to infection or inflammation of the appropriate clinical context or fibrosis  Malignancy is felt to be less likely but not excluded  May consider short-term follow-up imaging in 2-3 months for reassessment  2   Consolidative changes in the left lower lobe and inferior lingula may be due to atelectasis  3   1 6 cm heterogeneous nodule the right lobe of the thyroid gland, recommend correlation with thyroid ultrasound if not obtained within the prior 6 months  4   There is an ill-defined area of hypoattenuation within the inferior right hepatic lobe of uncertain etiology  Not appreciated on the previous exam   Consider further assessment with contrast-enhanced MRI  5   Bilateral ureteral stents with calcifications suspected along the proximal right ureteral stent few scattered calcifications along the mid to distal left ureteral stent    6   Additional findings discussed above        3/17/2023 MRI pelvics rectal cancer staging    IMPRESSION:  Unenhanced MRI of the Pelvis (Rectal Protocol)      SINCE MRI PELVIS RECTAL CANCER STAGING DATED 11/1/2022, POST TREATMENT PRIMARY TUMOR ASSESSMENT: Incomplete response (likely residual tumor ) Please note, though incomplete, there has been substantial tumor response to treatment with a significant   decrease in size of the high rectal cancer since MRI 11/1/2022       SUSPICIOUS MESORECTAL LYMPH NODES: No      SUSPICIOUS EXTRAMESORECTAL LYMPH NODES: No            2/11/2023 CAT scan abdomen pelvis with contrast    IMPRESSION:     Interval decrease in right-sided hydronephrosis      Thickening of the ascending colon and cecum which may represent colitis  Follow-up with gastroenterology      Stable 1 cm right adrenal gland nodule  Although its imaging features are indeterminate, it does not have suspicious imaging features (heterogeneity, necrosis, irregular margins), therefore this is likely benign, and can be followed by non-contrast abdomen CT or MRI in 12 months  If patient has history of adrenal hyperfunction, consider biochemical evaluation  Pathology    Case Report   Surgical Pathology Report                         Case: [id number]                                    Authorizing Provider: Moises Moctezuma MD           Collected:           02/22/2023 0731               Ordering Location:     [address]      Received:            02/22/2023 [address] [place] [address] Specialty                                                                                   Laboratory                                                                    Pathologist:           Katty Salas MD                                                                  Specimen:    Colon, Rectosigmoid Colon Mass Biopsy (1 slide [id number] [facility], collected 10/28/2022)                                                              Final Diagnosis   A  Colon, Rectosigmoid Colon Mass Biopsy (1 slide [address] , collected 10/28/2022):  - Adenocarcinoma arising in a tubular adenoma      Note: Per outside report (slides were not available for review)       RESULTS OF IMMUNOHISTOCHEMICAL ANALYSIS FOR MISMATCH REPAIR PROTEIN LOSS     RESULTS:  Antibody            Clone                 Description                     Results  MLH1                 M1                     Mismatch repair protein  Intact nuclear expression  MSH2                [id number]        Mismatch repair protein  Intact nuclear expression  MSH6                SP93                  Mismatch repair protein  Intact nuclear expression  PMS2                 A16-4                 Mismatch repair protein  Intact nuclear expression      Electronically signed by Kimberly Morse MD on 2/22/2023 at 10:11 AM

## 2023-04-07 ENCOUNTER — ANESTHESIA (OUTPATIENT)
Dept: GASTROENTEROLOGY | Facility: HOSPITAL | Age: 58
End: 2023-04-07

## 2023-04-07 ENCOUNTER — HOSPITAL ENCOUNTER (OUTPATIENT)
Dept: GASTROENTEROLOGY | Facility: HOSPITAL | Age: 58
Setting detail: OUTPATIENT SURGERY
End: 2023-04-07
Attending: COLON & RECTAL SURGERY

## 2023-04-07 ENCOUNTER — ANESTHESIA EVENT (OUTPATIENT)
Dept: GASTROENTEROLOGY | Facility: HOSPITAL | Age: 58
End: 2023-04-07

## 2023-04-07 VITALS
RESPIRATION RATE: 18 BRPM | BODY MASS INDEX: 26.31 KG/M2 | WEIGHT: 143 LBS | HEIGHT: 62 IN | TEMPERATURE: 97.2 F | HEART RATE: 85 BPM | OXYGEN SATURATION: 97 % | SYSTOLIC BLOOD PRESSURE: 134 MMHG | DIASTOLIC BLOOD PRESSURE: 65 MMHG

## 2023-04-07 DIAGNOSIS — C18.7 MALIGNANT NEOPLASM OF SIGMOID COLON (HCC): ICD-10-CM

## 2023-04-07 RX ORDER — SODIUM CHLORIDE 9 MG/ML
INJECTION, SOLUTION INTRAVENOUS CONTINUOUS PRN
Status: DISCONTINUED | OUTPATIENT
Start: 2023-04-07 | End: 2023-04-07

## 2023-04-07 RX ORDER — PROPOFOL 10 MG/ML
INJECTION, EMULSION INTRAVENOUS AS NEEDED
Status: DISCONTINUED | OUTPATIENT
Start: 2023-04-07 | End: 2023-04-07

## 2023-04-07 RX ADMIN — PROPOFOL 150 MG: 10 INJECTION, EMULSION INTRAVENOUS at 11:32

## 2023-04-07 RX ADMIN — SODIUM CHLORIDE: 9 INJECTION, SOLUTION INTRAVENOUS at 11:25

## 2023-04-07 NOTE — INTERVAL H&P NOTE
Sigmoidoscopy today as discussed at office visit  H&P reviewed  After examining the patient I find no changes in the patients condition since the H&P had been written      Vitals:    04/07/23 1103   BP: 139/69   Pulse: 88   Resp: 17   Temp: 98 1 °F (36 7 °C)   SpO2: 97%

## 2023-04-07 NOTE — ANESTHESIA PREPROCEDURE EVALUATION
Procedure:  FLEXIBLE SIGMOIDOSCOPY    Relevant Problems   ANESTHESIA (within normal limits)      CARDIO   (+) Chest pain   (+) Nonrheumatic mitral valve regurgitation   (+) Primary hypertension      GI/HEPATIC   (+) Malignant neoplasm of sigmoid colon (HCC)      /RENAL   (+) Kidney calculi   (+) R flank pain with bilateral hydronephrosis      HEMATOLOGY   (+) Other specified anemias   (+) Thrombocytopenia (HCC)      NEURO/PSYCH   (+) History of Clostridium difficile colitis      Other   (+) Hypokalemia   (+) Sarcoidosis   (+) Transaminitis        Physical Exam    Airway    Mallampati score: II  TM Distance: >3 FB  Neck ROM: full     Dental   No notable dental hx     Cardiovascular      Pulmonary      Other Findings        Anesthesia Plan  ASA Score- 3     Anesthesia Type- IV sedation with anesthesia with ASA Monitors  Additional Monitors:   Airway Plan:           Plan Factors-Exercise tolerance (METS): >4 METS  Chart reviewed  EKG reviewed  Existing labs reviewed  Patient summary reviewed  Patient is not a current smoker  Induction-     Postoperative Plan-     Informed Consent- Anesthetic plan and risks discussed with patient  I personally reviewed this patient with the CRNA  Discussed and agreed on the Anesthesia Plan with the CRNA  José Luis Durand

## 2023-04-07 NOTE — ANESTHESIA POSTPROCEDURE EVALUATION
Post-Op Assessment Note    CV Status:  Stable    Pain management: adequate     Mental Status:  Arousable and sleepy   Hydration Status:  Euvolemic   PONV Controlled:  Controlled   Airway Patency:  Patent      Post Op Vitals Reviewed: Yes      Staff: CRNA, Anesthesiologist         No notable events documented      BP   112/55   Temp 97 9   Pulse 82   Resp 14   SpO2 99

## 2023-04-08 NOTE — PATIENT INSTRUCTIONS
ASSESSMENT:  Marianna Hu is a 56 y o  female who presents new diagnosis from outside hospital, reviewed at [place] [address], identified prior as sigmoid or rectosigmoid adenocarcinoma, she has received diverting colostomy, we discussed on personal review of her imaging that this appears more likely a mid to upper rectal cancer and agree with plan for neoadjuvant chemoradiation therapy, also discussed this with both oncology and radiation oncology      PLAN:  Sigmoidoscopy to ascertain height of tumor as she has been recommended radiation therapy from prior hospitalization, may try to avoid in sarcoid if borderline tumor height or high rectal cancer/rectosigmoid  Onc followup rule out liver metastatic disease with new liver lesion seen  To be presented at tumor conference  CC:  Dr Judith Lau, PA-C  Dr Kate Chamberino  Dr Michelle Napier, RN  [facility]  GI Tumor Conference

## 2023-04-10 ENCOUNTER — APPOINTMENT (OUTPATIENT)
Dept: RADIATION ONCOLOGY | Facility: HOSPITAL | Age: 58
End: 2023-04-10
Attending: STUDENT IN AN ORGANIZED HEALTH CARE EDUCATION/TRAINING PROGRAM

## 2023-04-11 NOTE — RESULT ENCOUNTER NOTE
Spoke to pt spouse and advised  She was taking a n OTC potassium   He will  Pot Chl 40 meq daily and have pt recheck in 2-3 weeks

## 2023-04-14 ENCOUNTER — PATIENT OUTREACH (OUTPATIENT)
Dept: CASE MANAGEMENT | Facility: HOSPITAL | Age: 58
End: 2023-04-14

## 2023-04-14 PROBLEM — Z95.828 PORT-A-CATH IN PLACE: Status: ACTIVE | Noted: 2023-04-14

## 2023-04-14 NOTE — PROGRESS NOTES
Biopsychosocial and Barriers Assessment    Cancer Diagnosis: colon cancer   Home/Cell Phone: [phone]   Emergency Contact: Jayda Reis () [phone] (Mobile)  Marital Status:   Interpretation concerns, speaks another language, preferred language: English  Cultural concerns: no  Ability to read or write: yes    Caregiver/Support: ex- -Manuel Mcgee and two dtrs  Children: two dtrs- nataliia 21 in [place] and [place] [facility]  Child/Elder care: no     Housing: two story house  Home Setup: one level  Lives With:  -anay and two dtrs  Daily Living Activities: independent  Durable Medical Equipment: No  Ambulation: independent    Preferred Pharmacy:   High co-pays with insurance:   High co-pays with medication coverage:  No medication coverage:     Primary Care Provider: Dmitriy Saunders MD   Hx of Home Health Care: no   Hx of Short term rehab: no  Mental Health Hx: no   Substance Abuse Hx: no  Employment: disabled-SSI-$100 and pension-$700   Status/Location: no   Ability to pay bills: yes  No barriers to paying monthly bills  POA/LW/AD: no   - Manuel Mcgee is healthcare representative  MSW emailed advance directive  Transportation Plan/Concerns:  Patient transports self to appointments  MSW educated on [employer] transport services  What do you know about your Cancer Diagnosis    What has your doctor told you about your cancer diagnosis: Colon Cancer    What has your doctor told you about your cancer treatment: tentatively radiation /chemo     What specific concerns do you have about your diagnosis and treatment: Hopeful all goes well cancer treatment  Have you been made aware of any hair loss associated with treatment: no     Additional Comments:    MSW phoned and spoke with patient and [id number] on speaker phone  Patient states she is tired from her hospital visit last night  Patient states she did not sleep well  Patient states she is resting  Patient gave permission for ex- anay to also answer with the completion of initial assessment question  Patient has support from her  and two dtrs  Patient states she is hopeful all goes well with his cancer treatments  Jaya Uriarte states her care team is informative   MSW provided emotional support services  MSW emailed cancer support community calendar to patient's e-mail [email or web address] is provided this MSW's phone number for future resource needs  MSW will continue to follow and patient is agreeable

## 2023-04-17 ENCOUNTER — APPOINTMENT (OUTPATIENT)
Dept: RADIATION ONCOLOGY | Facility: HOSPITAL | Age: 58
End: 2023-04-17
Attending: STUDENT IN AN ORGANIZED HEALTH CARE EDUCATION/TRAINING PROGRAM

## 2023-04-18 ENCOUNTER — APPOINTMENT (OUTPATIENT)
Dept: RADIATION ONCOLOGY | Facility: HOSPITAL | Age: 58
End: 2023-04-18
Attending: STUDENT IN AN ORGANIZED HEALTH CARE EDUCATION/TRAINING PROGRAM

## 2023-04-19 ENCOUNTER — APPOINTMENT (OUTPATIENT)
Dept: LAB | Facility: AMBULARY SURGERY CENTER | Age: 58
End: 2023-04-19

## 2023-04-19 ENCOUNTER — APPOINTMENT (OUTPATIENT)
Dept: RADIATION ONCOLOGY | Facility: HOSPITAL | Age: 58
End: 2023-04-19
Attending: STUDENT IN AN ORGANIZED HEALTH CARE EDUCATION/TRAINING PROGRAM

## 2023-04-19 DIAGNOSIS — E87.6 HYPOKALEMIA: ICD-10-CM

## 2023-04-19 DIAGNOSIS — C18.7 MALIGNANT NEOPLASM OF SIGMOID COLON (HCC): ICD-10-CM

## 2023-04-19 LAB
ALBUMIN SERPL BCP-MCNC: 3.1 G/DL (ref 3.5–5)
ALP SERPL-CCNC: 402 U/L (ref 46–116)
ALT SERPL W P-5'-P-CCNC: 81 U/L (ref 12–78)
ANION GAP SERPL CALCULATED.3IONS-SCNC: 6 MMOL/L (ref 4–13)
AST SERPL W P-5'-P-CCNC: 59 U/L (ref 5–45)
BILIRUB SERPL-MCNC: 0.9 MG/DL (ref 0.2–1)
BUN SERPL-MCNC: 20 MG/DL (ref 5–25)
CALCIUM ALBUM COR SERPL-MCNC: 11.2 MG/DL (ref 8.3–10.1)
CALCIUM SERPL-MCNC: 10.5 MG/DL (ref 8.3–10.1)
CHLORIDE SERPL-SCNC: 106 MMOL/L (ref 96–108)
CO2 SERPL-SCNC: 26 MMOL/L (ref 21–32)
CREAT SERPL-MCNC: 0.88 MG/DL (ref 0.6–1.3)
GFR SERPL CREATININE-BSD FRML MDRD: 74 ML/MIN/1.73SQ M
GLUCOSE SERPL-MCNC: 152 MG/DL (ref 65–140)
MAGNESIUM SERPL-MCNC: 2.3 MG/DL (ref 1.6–2.6)
POTASSIUM SERPL-SCNC: 3.1 MMOL/L (ref 3.5–5.3)
PROT SERPL-MCNC: 7 G/DL (ref 6.4–8.4)
SODIUM SERPL-SCNC: 138 MMOL/L (ref 135–147)

## 2023-04-20 ENCOUNTER — APPOINTMENT (OUTPATIENT)
Dept: RADIATION ONCOLOGY | Facility: HOSPITAL | Age: 58
End: 2023-04-20
Attending: STUDENT IN AN ORGANIZED HEALTH CARE EDUCATION/TRAINING PROGRAM

## 2023-04-21 ENCOUNTER — APPOINTMENT (OUTPATIENT)
Dept: RADIATION ONCOLOGY | Facility: HOSPITAL | Age: 58
End: 2023-04-21
Attending: STUDENT IN AN ORGANIZED HEALTH CARE EDUCATION/TRAINING PROGRAM

## 2023-04-22 ENCOUNTER — HOSPITAL ENCOUNTER (OUTPATIENT)
Dept: INFUSION CENTER | Facility: CLINIC | Age: 58
Discharge: HOME | End: 2023-04-22

## 2023-04-22 ENCOUNTER — APPOINTMENT (OUTPATIENT)
Dept: LAB | Facility: CLINIC | Age: 58
End: 2023-04-22

## 2023-04-22 DIAGNOSIS — C18.7 MALIGNANT NEOPLASM OF SIGMOID COLON (HCC): Primary | ICD-10-CM

## 2023-04-22 DIAGNOSIS — C18.7 MALIGNANT NEOPLASM OF SIGMOID COLON (HCC): ICD-10-CM

## 2023-04-22 LAB
ALBUMIN SERPL BCP-MCNC: 3.5 G/DL (ref 3.5–5)
ALP SERPL-CCNC: 337 U/L (ref 34–104)
ALT SERPL W P-5'-P-CCNC: 52 U/L (ref 7–52)
ANION GAP SERPL CALCULATED.3IONS-SCNC: 9 MMOL/L (ref 4–13)
AST SERPL W P-5'-P-CCNC: 41 U/L (ref 13–39)
BASOPHILS # BLD AUTO: 0.03 THOUSANDS/ΜL (ref 0–0.1)
BASOPHILS NFR BLD AUTO: 0 % (ref 0–1)
BILIRUB SERPL-MCNC: 1.18 MG/DL (ref 0.2–1)
BUN SERPL-MCNC: 20 MG/DL (ref 5–25)
CALCIUM SERPL-MCNC: 9.6 MG/DL (ref 8.4–10.2)
CHLORIDE SERPL-SCNC: 104 MMOL/L (ref 96–108)
CO2 SERPL-SCNC: 27 MMOL/L (ref 21–32)
CREAT SERPL-MCNC: 0.82 MG/DL (ref 0.6–1.3)
EOSINOPHIL # BLD AUTO: 0.12 THOUSAND/ΜL (ref 0–0.61)
EOSINOPHIL NFR BLD AUTO: 1 % (ref 0–6)
ERYTHROCYTE [DISTWIDTH] IN BLOOD BY AUTOMATED COUNT: 14 % (ref 11.6–15.1)
GFR SERPL CREATININE-BSD FRML MDRD: 80 ML/MIN/1.73SQ M
GLUCOSE SERPL-MCNC: 112 MG/DL (ref 65–140)
HCT VFR BLD AUTO: 38.8 % (ref 34.8–46.1)
HGB BLD-MCNC: 11.9 G/DL (ref 11.5–15.4)
IMM GRANULOCYTES # BLD AUTO: 0.05 THOUSAND/UL (ref 0–0.2)
IMM GRANULOCYTES NFR BLD AUTO: 1 % (ref 0–2)
LYMPHOCYTES # BLD AUTO: 0.57 THOUSANDS/ΜL (ref 0.6–4.47)
LYMPHOCYTES NFR BLD AUTO: 6 % (ref 14–44)
MCH RBC QN AUTO: 27.9 PG (ref 26.8–34.3)
MCHC RBC AUTO-ENTMCNC: 30.7 G/DL (ref 31.4–37.4)
MCV RBC AUTO: 91 FL (ref 82–98)
MONOCYTES # BLD AUTO: 0.3 THOUSAND/ΜL (ref 0.17–1.22)
MONOCYTES NFR BLD AUTO: 3 % (ref 4–12)
NEUTROPHILS # BLD AUTO: 7.81 THOUSANDS/ΜL (ref 1.85–7.62)
NEUTS SEG NFR BLD AUTO: 89 % (ref 43–75)
NRBC BLD AUTO-RTO: 0 /100 WBCS
PLATELET # BLD AUTO: 255 THOUSANDS/UL (ref 149–390)
PMV BLD AUTO: 9.5 FL (ref 8.9–12.7)
POTASSIUM SERPL-SCNC: 2.6 MMOL/L (ref 3.5–5.3)
PROT SERPL-MCNC: 6.4 G/DL (ref 6.4–8.4)
RBC # BLD AUTO: 4.27 MILLION/UL (ref 3.81–5.12)
SODIUM SERPL-SCNC: 140 MMOL/L (ref 135–147)
WBC # BLD AUTO: 8.88 THOUSAND/UL (ref 4.31–10.16)

## 2023-04-22 NOTE — PROGRESS NOTES
Pt  offers no complaints  Elastomeric pump discontinued, ball appears deflated  AVS declined, next appt  confirmed

## 2023-04-24 ENCOUNTER — HOSPITAL ENCOUNTER (OUTPATIENT)
Dept: INFUSION CENTER | Facility: CLINIC | Age: 58
Discharge: HOME | End: 2023-04-24

## 2023-04-24 ENCOUNTER — APPOINTMENT (OUTPATIENT)
Dept: RADIATION ONCOLOGY | Facility: HOSPITAL | Age: 58
End: 2023-04-24
Attending: STUDENT IN AN ORGANIZED HEALTH CARE EDUCATION/TRAINING PROGRAM

## 2023-04-24 ENCOUNTER — TELEPHONE (OUTPATIENT)
Dept: HEMATOLOGY ONCOLOGY | Facility: MEDICAL CENTER | Age: 58
End: 2023-04-24

## 2023-04-24 VITALS
RESPIRATION RATE: 18 BRPM | BODY MASS INDEX: 27.42 KG/M2 | HEART RATE: 89 BPM | TEMPERATURE: 96.9 F | WEIGHT: 149 LBS | DIASTOLIC BLOOD PRESSURE: 78 MMHG | OXYGEN SATURATION: 98 % | HEIGHT: 62 IN | SYSTOLIC BLOOD PRESSURE: 148 MMHG

## 2023-04-24 DIAGNOSIS — E87.6 HYPOKALEMIA: ICD-10-CM

## 2023-04-24 DIAGNOSIS — C18.7 MALIGNANT NEOPLASM OF SIGMOID COLON (HCC): Primary | ICD-10-CM

## 2023-04-24 RX ORDER — POTASSIUM CHLORIDE 14.9 MG/ML
20 INJECTION INTRAVENOUS ONCE
Status: COMPLETED | OUTPATIENT
Start: 2023-04-24 | End: 2023-04-24

## 2023-04-24 RX ORDER — POTASSIUM CHLORIDE 14.9 MG/ML
20 INJECTION INTRAVENOUS ONCE
Status: CANCELLED
Start: 2023-04-24

## 2023-04-24 RX ADMIN — POTASSIUM CHLORIDE 20 MEQ: 14.9 INJECTION, SOLUTION INTRAVENOUS at 09:10

## 2023-04-24 NOTE — PROGRESS NOTES
Pt tolerated treatment well  CADD pump connected after two RN check  Pt declined AVS  Pt aware to return Saturday at 1130 for disconnect

## 2023-04-24 NOTE — PROGRESS NOTES
Patient due for next cycle of CI 5FU pump  Patients k+ from 4/22 resulted at 2 6  Ronald Domingo RN in Dr Yasmine Humphrey office notified  Patient to receive 20gm of k+ today  Order placed in plan  Patients PCP sent message last week to Dr Maldonado Valladares regarding patients chronic low k+ despite high dose oral replacement

## 2023-04-24 NOTE — PROGRESS NOTES
Pt here for IV K and Cadd conncet  Vitals stable; labs within parameters for treatment  Callbell within reach

## 2023-04-25 ENCOUNTER — APPOINTMENT (OUTPATIENT)
Dept: RADIATION ONCOLOGY | Facility: HOSPITAL | Age: 58
End: 2023-04-25
Attending: STUDENT IN AN ORGANIZED HEALTH CARE EDUCATION/TRAINING PROGRAM

## 2023-04-26 ENCOUNTER — APPOINTMENT (OUTPATIENT)
Dept: RADIATION ONCOLOGY | Facility: HOSPITAL | Age: 58
End: 2023-04-26
Attending: STUDENT IN AN ORGANIZED HEALTH CARE EDUCATION/TRAINING PROGRAM

## 2023-04-27 ENCOUNTER — TELEPHONE (OUTPATIENT)
Dept: HEMATOLOGY ONCOLOGY | Facility: CLINIC | Age: 58
End: 2023-04-27

## 2023-04-27 ENCOUNTER — APPOINTMENT (OUTPATIENT)
Dept: RADIATION ONCOLOGY | Facility: HOSPITAL | Age: 58
End: 2023-04-27
Attending: STUDENT IN AN ORGANIZED HEALTH CARE EDUCATION/TRAINING PROGRAM

## 2023-04-27 NOTE — TELEPHONE ENCOUNTER
Left voicemail for patient's emergency contact Teri Hernandez to call my TEAMs number regarding missed appt today

## 2023-04-28 ENCOUNTER — PATIENT OUTREACH (OUTPATIENT)
Dept: HEMATOLOGY ONCOLOGY | Facility: CLINIC | Age: 58
End: 2023-04-28

## 2023-04-28 ENCOUNTER — APPOINTMENT (OUTPATIENT)
Dept: RADIATION ONCOLOGY | Facility: HOSPITAL | Age: 58
End: 2023-04-28
Attending: STUDENT IN AN ORGANIZED HEALTH CARE EDUCATION/TRAINING PROGRAM

## 2023-04-28 NOTE — PROGRESS NOTES
Diagnosis- Adenocarcinoma of rectosigmoid    Call to the pt to see how she is w tx, ex- answered on her behalf,  He reports rectal pain and some blood and mucus present from the anus- states she said it was painful last night but the pain subsided and she was fine after- I instructed him to give me a call if her pain was to worsen and persist- pt is rcving radiation therapy and chemo- said that the pt has low potassium and they have been monitoring this  Pt is taking oral K supplements  Pt denies liquid stool  No other complaints or questions at this time, they state the pt has been doing well otherwise  He knows to call me if they have any questions or concerns

## 2023-04-29 ENCOUNTER — HOSPITAL ENCOUNTER (OUTPATIENT)
Dept: INFUSION CENTER | Facility: CLINIC | Age: 58
Discharge: HOME | End: 2023-04-29

## 2023-04-29 DIAGNOSIS — C18.7 MALIGNANT NEOPLASM OF SIGMOID COLON (HCC): Primary | ICD-10-CM

## 2023-04-29 LAB
ALBUMIN SERPL BCP-MCNC: 3.5 G/DL (ref 3.5–5)
ALP SERPL-CCNC: 315 U/L (ref 34–104)
ALT SERPL W P-5'-P-CCNC: 47 U/L (ref 7–52)
ANION GAP SERPL CALCULATED.3IONS-SCNC: 6 MMOL/L (ref 4–13)
AST SERPL W P-5'-P-CCNC: 37 U/L (ref 13–39)
BASOPHILS # BLD AUTO: 0.01 THOUSANDS/ΜL (ref 0–0.1)
BASOPHILS NFR BLD AUTO: 0 % (ref 0–1)
BILIRUB SERPL-MCNC: 0.89 MG/DL (ref 0.2–1)
BUN SERPL-MCNC: 19 MG/DL (ref 5–25)
CALCIUM SERPL-MCNC: 9.8 MG/DL (ref 8.4–10.2)
CEA SERPL-MCNC: 1.5 NG/ML (ref 0–3)
CHLORIDE SERPL-SCNC: 106 MMOL/L (ref 96–108)
CO2 SERPL-SCNC: 27 MMOL/L (ref 21–32)
CREAT SERPL-MCNC: 0.73 MG/DL (ref 0.6–1.3)
EOSINOPHIL # BLD AUTO: 0.12 THOUSAND/ΜL (ref 0–0.61)
EOSINOPHIL NFR BLD AUTO: 3 % (ref 0–6)
ERYTHROCYTE [DISTWIDTH] IN BLOOD BY AUTOMATED COUNT: 15.4 % (ref 11.6–15.1)
GFR SERPL CREATININE-BSD FRML MDRD: 93 ML/MIN/1.73SQ M
GLUCOSE SERPL-MCNC: 89 MG/DL (ref 65–140)
HCT VFR BLD AUTO: 36.6 % (ref 34.8–46.1)
HGB BLD-MCNC: 11.1 G/DL (ref 11.5–15.4)
IMM GRANULOCYTES # BLD AUTO: 0.03 THOUSAND/UL (ref 0–0.2)
IMM GRANULOCYTES NFR BLD AUTO: 1 % (ref 0–2)
LYMPHOCYTES # BLD AUTO: 0.3 THOUSANDS/ΜL (ref 0.6–4.47)
LYMPHOCYTES NFR BLD AUTO: 7 % (ref 14–44)
MCH RBC QN AUTO: 27.3 PG (ref 26.8–34.3)
MCHC RBC AUTO-ENTMCNC: 30.3 G/DL (ref 31.4–37.4)
MCV RBC AUTO: 90 FL (ref 82–98)
MONOCYTES # BLD AUTO: 0.24 THOUSAND/ΜL (ref 0.17–1.22)
MONOCYTES NFR BLD AUTO: 5 % (ref 4–12)
NEUTROPHILS # BLD AUTO: 3.88 THOUSANDS/ΜL (ref 1.85–7.62)
NEUTS SEG NFR BLD AUTO: 84 % (ref 43–75)
NRBC BLD AUTO-RTO: 0 /100 WBCS
PLATELET # BLD AUTO: 141 THOUSANDS/UL (ref 149–390)
PMV BLD AUTO: 9.6 FL (ref 8.9–12.7)
POTASSIUM SERPL-SCNC: 2.8 MMOL/L (ref 3.5–5.3)
PROT SERPL-MCNC: 6.3 G/DL (ref 6.4–8.4)
RBC # BLD AUTO: 4.06 MILLION/UL (ref 3.81–5.12)
SODIUM SERPL-SCNC: 139 MMOL/L (ref 135–147)
WBC # BLD AUTO: 4.58 THOUSAND/UL (ref 4.31–10.16)

## 2023-04-29 NOTE — PROGRESS NOTES
Patient arrives to infusion center for CADD D/C  Elba volume  = 0 mL  Disconnected without issue, port remains with brisk blood return, labs drawn and sent as per orders  Port flushed well without resistance  Deaccessed, bandaid in place  [facility] Monday[facility]  Patient ambulatory with family member upon DC

## 2023-04-30 DIAGNOSIS — N13.30 BILATERAL HYDRONEPHROSIS: ICD-10-CM

## 2023-04-30 RX ORDER — OXYBUTYNIN CHLORIDE 5 MG/1
5 TABLET ORAL EVERY 6 HOURS PRN
Qty: 30 TABLET | Refills: 0 | Status: SHIPPED | OUTPATIENT
Start: 2023-04-30

## 2023-05-01 ENCOUNTER — HOSPITAL ENCOUNTER (OUTPATIENT)
Dept: INFUSION CENTER | Facility: CLINIC | Age: 58
Discharge: HOME | End: 2023-05-01

## 2023-05-01 ENCOUNTER — APPOINTMENT (OUTPATIENT)
Dept: RADIATION ONCOLOGY | Facility: HOSPITAL | Age: 58
End: 2023-05-01
Attending: STUDENT IN AN ORGANIZED HEALTH CARE EDUCATION/TRAINING PROGRAM

## 2023-05-01 VITALS
WEIGHT: 148.5 LBS | DIASTOLIC BLOOD PRESSURE: 80 MMHG | SYSTOLIC BLOOD PRESSURE: 159 MMHG | HEIGHT: 62 IN | TEMPERATURE: 97.2 F | RESPIRATION RATE: 18 BRPM | OXYGEN SATURATION: 99 % | HEART RATE: 93 BPM | BODY MASS INDEX: 27.33 KG/M2

## 2023-05-01 DIAGNOSIS — E87.6 HYPOKALEMIA: ICD-10-CM

## 2023-05-01 DIAGNOSIS — C18.7 MALIGNANT NEOPLASM OF SIGMOID COLON (HCC): Primary | ICD-10-CM

## 2023-05-01 RX ORDER — POTASSIUM CHLORIDE 14.9 MG/ML
20 INJECTION INTRAVENOUS ONCE
Status: CANCELLED
Start: 2023-05-01

## 2023-05-01 RX ORDER — POTASSIUM CHLORIDE 14.9 MG/ML
20 INJECTION INTRAVENOUS ONCE
Status: COMPLETED | OUTPATIENT
Start: 2023-05-01 | End: 2023-05-01

## 2023-05-01 RX ADMIN — POTASSIUM CHLORIDE 20 MEQ: 14.9 INJECTION, SOLUTION INTRAVENOUS at 09:09

## 2023-05-01 NOTE — PROGRESS NOTES
Patient here for CADD connect  Potassium collected 4/29 is 2 8, will receive IV potassium prior to CADD connect  Currently infusing

## 2023-05-01 NOTE — PROGRESS NOTES
CADD pump connected, verified with 2nd RN, all connections secured and green light blinking  Patient has 2 extra batteries  She is aware of her disconnect time, declined AVS   Awaiting PRN potassium replacement to be added to plan for future visits, Analisa Hernandez aware

## 2023-05-02 ENCOUNTER — APPOINTMENT (OUTPATIENT)
Dept: RADIATION ONCOLOGY | Facility: HOSPITAL | Age: 58
End: 2023-05-02
Attending: STUDENT IN AN ORGANIZED HEALTH CARE EDUCATION/TRAINING PROGRAM

## 2023-05-03 ENCOUNTER — APPOINTMENT (OUTPATIENT)
Dept: RADIATION ONCOLOGY | Facility: HOSPITAL | Age: 58
End: 2023-05-03
Attending: STUDENT IN AN ORGANIZED HEALTH CARE EDUCATION/TRAINING PROGRAM

## 2023-05-03 ENCOUNTER — OFFICE VISIT (OUTPATIENT)
Dept: HEMATOLOGY ONCOLOGY | Facility: CLINIC | Age: 58
End: 2023-05-03

## 2023-05-03 ENCOUNTER — APPOINTMENT (OUTPATIENT)
Dept: LAB | Facility: CLINIC | Age: 58
End: 2023-05-03

## 2023-05-03 VITALS
BODY MASS INDEX: 28.32 KG/M2 | WEIGHT: 150 LBS | DIASTOLIC BLOOD PRESSURE: 80 MMHG | HEIGHT: 61 IN | SYSTOLIC BLOOD PRESSURE: 144 MMHG | HEART RATE: 90 BPM | TEMPERATURE: 97.3 F | OXYGEN SATURATION: 97 % | RESPIRATION RATE: 16 BRPM

## 2023-05-03 DIAGNOSIS — R30.0 DYSURIA: ICD-10-CM

## 2023-05-03 DIAGNOSIS — C18.7 MALIGNANT NEOPLASM OF SIGMOID COLON (HCC): ICD-10-CM

## 2023-05-03 DIAGNOSIS — C18.7 MALIGNANT NEOPLASM OF SIGMOID COLON (HCC): Primary | ICD-10-CM

## 2023-05-03 DIAGNOSIS — R30.0 DYSURIA: Primary | ICD-10-CM

## 2023-05-03 DIAGNOSIS — E87.6 HYPOKALEMIA: ICD-10-CM

## 2023-05-03 DIAGNOSIS — C20 RECTAL CANCER (HCC): Primary | ICD-10-CM

## 2023-05-03 DIAGNOSIS — D86.9 SARCOIDOSIS: ICD-10-CM

## 2023-05-03 LAB
BACTERIA UR QL AUTO: NORMAL /HPF
BILIRUB UR QL STRIP: NEGATIVE
CLARITY UR: CLEAR
COLOR UR: COLORLESS
GLUCOSE UR STRIP-MCNC: NEGATIVE MG/DL
HGB UR QL STRIP.AUTO: ABNORMAL
KETONES UR STRIP-MCNC: NEGATIVE MG/DL
LEUKOCYTE ESTERASE UR QL STRIP: ABNORMAL
NITRITE UR QL STRIP: NEGATIVE
NON-SQ EPI CELLS URNS QL MICRO: NORMAL /HPF
PH UR STRIP.AUTO: 6.5 [PH]
PROT UR STRIP-MCNC: NEGATIVE MG/DL
RBC #/AREA URNS AUTO: NORMAL /HPF
SP GR UR STRIP.AUTO: 1 (ref 1–1.03)
UROBILINOGEN UR STRIP-ACNC: <2 MG/DL
WBC #/AREA URNS AUTO: NORMAL /HPF

## 2023-05-03 RX ORDER — OXYCODONE HYDROCHLORIDE 5 MG/1
5 TABLET ORAL EVERY 4 HOURS PRN
Qty: 30 TABLET | Refills: 0 | Status: SHIPPED | OUTPATIENT
Start: 2023-05-03

## 2023-05-03 NOTE — PROGRESS NOTES
Daina Fair  1965  [address] [facility] [place]  [address]   [place] [facility] [address]     DISCUSSION/SUMMARY:    73-year-old female recently diagnosed with rectal cancer  Issues:    Hypokalemia  Etiology is not clear  Patient states that she does not have profuse watery diarrhea  Mrs Alli Longoria has only been taking 1 tablet of 10 mil equivalents of potassium a day  As I understand, this is not what the PCP wanted  We discussed options  Patient will take 1 tablet/10 mil equivalents 3 times a day with breakfast, lunch and dinner  Rectal cancer  Clinical stage from another institution is cT3 cN0 cM0  Patient required upfront diverting procedure - reason not entirely clear (patient did not know), presumed pain, bleeding, obstruction  Mrs Alli Longoria apparently was able to tolerate the first cycle of CapeOX but developed a severe reaction on the second cycle  There are notes in Care Everywhere where the plan at [facility] was to desensitize the patient to oxaliplatin  Case recently discussed with Dr Araceli Stanford, radiation oncology  The plan is to move forward with long course chemo RT as long as there is no evidence for metastases  Patient has been tolerating the concurrent 5-FU with RT relatively well  Patient has approximately 3 weeks left  Blood work other than the potassium level is okay/acceptable  The plan is to continue with the present regimen and eventually patient will follow-up with colorectal surgery  NCCN guidelines 4 2022 state that for patients with T3, N any with clear CRM by MRI, neoadjuvant options include the standard chemotherapy for 3 to 4 months, long course chemo RT and then restaging with evaluation for resection  Another option includes upfront long course chemo RT with either capecitabine or fusion or 5-FU  Questionable history of hemochromatosis    Recent genetic studies did not demonstrate any of the known mutations  Liver lesion  Etiology is still unclear (metastatic rectal versus another primary versus other [history of sarcoidosis])  Biopsy is scheduled for approximately 1 week  Dysuria, burning  Urine analysis requested  Pain control  Palliative care evaluation has been requested  Patient has oxycodone (previously from [place])  Sarcoidosis  This is a relatively new diagnosis  No significant respiratory issues at this time  Patient is on a prednisone taper  Patient is being followed by pulmonary  Nephrolithiasis, ureteral obstruction  Patient follows up with   Patient is to return in 4 weeks  Mrs Jeff Askew knows to call the hematology/oncology office if there are any other questions or concerns  Carefully review your medication list and verify that the list is accurate and up-to-date  Please call the hematology/oncology office if there are medications missing from the list, medications on the list that you are not currently taking or if there is a dosage or instruction that is different from how you're taking that medication  Patient goals and areas of care: Continue with concurrent treatment  Barriers to care: none  Patient is able to self-care  _____________________________________________________________________________________    Chief Complaint   Patient presents with    Follow-up    Rectal cancer on concurrent treatment     Oncology History   Malignant neoplasm of sigmoid colon ([facility] [address] )   10/28/2022 Biopsy    Colon, Rectosigmoid Colon Mass Biopsy (1 slide [address] , collected 10/28/2022):  - Adenocarcinoma arising in a tubular adenoma     12/1/2022 Biopsy    DIAGNOSIS LIVER, SEGMENT 3, RESECTION:  - HYALINIZED SUBCAPSULAR AND INTRA-PARENCHYMAL NODULES  NO MALIGNANCY SEEN  SEE NOTE  - MILD MACROVESICULAR STEATOSIS (25%)  NO EVIDENCE OF STEATOHEPATITIS   TRICHOME STAIN SHOWS MILD PORTAL FIBROSIS    - 2+ IRON DEPOSITION WITHIN KUPFFER CELLS (IRON STAIN), CONSISTENT WITH SECONDARY HEMOCHROMATOSIS  NOTE: THIS LIKELY REPRESENTS A MARKEDLY SCLEROTIC HEMANGIOMA OR CHANGES SECONDARY TO INJURY  12/1/2022 Surgery    Laparoscopic diverting colostomy with liver biopsy  Dr Callie Quintero       12/25/2022 Initial Diagnosis    Malignant neoplasm of sigmoid colon ([facility] [address] )     2022 - 1/10/2023 Chemotherapy    Oxaliplatin  [address]     2/24/2023 -  Cancer Staged    Staging form: Colon and Rectum, AJCC 8th Edition  - Clinical: Stage IIA (cT3, cN0, cM0) - Signed by Noah Flores MD on 2/24/2023  Total positive nodes: 0       4/17/2023 -  Chemotherapy    potassium chloride, 20 mEq, Intravenous, Once, 3 of 3 cycles  Administration: 20 mEq (4/17/2023), 20 mEq (4/24/2023), 20 mEq (5/1/2023)  fluorouracil (ADRUCIL) ambulatory infusion Soln, 225 mg/m2/day = 1,880 mg, Intravenous, Over 120 hours, 3 of 5 cycles       History of Present Illness: 53-year-old female recently diagnosed with rectal adenocarcinoma recently moving into [place] and in need of a new medical oncologist   Patient has a complicated cancer history; turns for follow-up  Patient was diagnosed with cT3 cN0 cM0 rectal adenocarcinoma in the [facility]  Although the specifics are not clear, patient required a diverting ileostomy  Patient was then started on neoadjuvant CapeOx  Patient was able to get through the first cycle but then had a reaction on the second cycle  At that time, Mrs Jauregui Has and her  decided to move to [place]  Patient was treated somewhat with the Xeloda only but has not been treated for 2 months  Patient also has a history of sarcoidosis, on steroids (being tapered)  Presently patient states feeling +/-  Patient is tolerating the chemotherapy and RT but has spasms of pain in her pelvis  Appetite is okay, colostomy is working well  No obvious GI bleeding  Patient still has rectal discharge occasionally with blood  Patient also has burning with urination  No fevers, chills or sweats  Activities are limited but about the same as before  Review of Systems   Constitutional: Positive for activity change, appetite change and fatigue  HENT: Negative  Eyes: Negative  Respiratory: Negative  Cardiovascular: Negative  Gastrointestinal: Negative  Endocrine: Negative  Genitourinary: Negative  Musculoskeletal: Negative  Skin: Negative  Allergic/Immunologic: Negative  Neurological: Negative  Hematological: Negative  Psychiatric/Behavioral: Negative  All other systems reviewed and are negative        Patient Active Problem List   Diagnosis    Malignant neoplasm of sigmoid colon ([facility] )    History of Clostridium difficile colitis    Other hemochromatosis    Primary hypertension    Sarcoidosis    Impaired fasting glucose    Hypokalemia    Transaminitis    R flank pain with bilateral hydronephrosis    Adrenal nodule ([facility] )    Bleeding from colostomy stoma ([facility] )    Other specified anemias    Thrombocytopenia ([facility] )    Nonrheumatic mitral valve regurgitation    Advance directive discussed with patient    Gross hematuria    Skin lesion    Abnormal CT of the chest    Kidney calculi    Dysuria    Chest pain    Low TSH level    Healthcare maintenance    Port-A-Cath in place     Past Medical History:   Diagnosis Date    Cancer [facility])     [facility]     Fall     Headache     Hemochromatosis, unspecified     History of transfusion     2022 - no adverse reaction    Kidney calculi     Kidney stone     Liver disease     hemangioma    Muscle weakness     Personal history of COVID-19 12/2022    Sarcoidosis     Seizures ([facility] )     2022     Past Surgical History:   Procedure Laterality Date    ABSCESS DRAINAGE      left breast    CHEST TUBE INSERTION      COLON SURGERY      colostomy    COLONOSCOPY      FL GUIDED CENTRAL VENOUS ACCESS DEVICE INSERTION  3/21/2023    FL RETROGRADE PYELOGRAM  1/23/2023    FL RETROGRADE PYELOGRAM  4/3/2023    LUNG BIOPSY      MO CYSTO/URETERO W/LITHOTRIPSY &INDWELL STENT INSRT Bilateral 1/23/2023    Procedure: CYSTOSCOPY  RIGHT URETEROSCOPY WITH LITHOTRIPSY HOLMIUM LASER, BILATERAL RETROGRADE PYELOGRAM AND BILATERAL  URETERAL STENT INSERTION;  Surgeon: Lorette Goodpasture, MD;  [facility];  Service: Urology    MO CYSTO/URETERO W/LITHOTRIPSY &INDWELL STENT INSRT Right 4/3/2023    Procedure: RIGHT URETEROSCOPY WITH LITHOTRIPSY HOLMIUM LASER, RETROGRADE PYELOGRAM; BILATERAL EXCHANGE STENT URETERAL;  Surgeon: Lorette Goodpasture, MD;  Location: [facility];  Service: Urology    MO INSJ TUNNELED CTR VAD W/SUBQ PORT AGE 5 YR/> N/A 3/21/2023    Procedure: INSERTION VENOUS PORT ( PORT-A-CATH) IR;  Surgeon: Aguila Rivera DO;  [facility];  Service: Interventional Radiology    TONSILLECTOMY      URINARY SURGERY Bilateral     bilateral stents     Family History   Problem Relation Age of Onset    Cancer Mother     Cirrhosis Father      Social History     Socioeconomic History    Marital status:      Spouse name: Not on file    Number of children: Not on file    Years of education: Not on file    Highest education level: Not on file   Occupational History    Not on file   Tobacco Use    Smoking status: Never     Passive exposure: Past    Smokeless tobacco: Never   Vaping Use    Vaping Use: Never used   Substance and Sexual Activity    Alcohol use: Never    Drug use: Never    Sexual activity: Not Currently   Other Topics Concern    Not on file   Social History Narrative    Not on file     Social Determinants of Health     Financial Resource Strain: Not on file   Food Insecurity: No Food Insecurity    Worried About Running Out of Food in the Last Year: Never true    [address] in the Last Year: Never true   Transportation Needs: No Transportation Needs    Lack of Transportation (Medical):  No    Lack of Transportation (Non-Medical):  No   Physical Activity: Not on file   Stress: Not on file   Social Connections: Not on file   Intimate Partner Violence: Not on file   Housing Stability: Low Risk     Unable to Pay for Housing in the Last Year: No    Number of Places Lived in the Last Year: 1    Unstable Housing in the Last Year: No       Current Outpatient Medications:     cyanocobalamin (VITAMIN B-12) 1000 MCG tablet, Take 1,000 mcg by mouth daily, Disp: , Rfl:     fluorouracil 1,880 mg in CADD/Elastomeric Infusion Device, Infuse 1,880 mg (225 mg/m2/day x 1 67 m2) into a catheter in a vein via infusion device over 120 hours for 5 days  Infusion planned for May 1, 2023 , Disp: 1 each, Rfl: 0    oxyCODONE (Roxicodone) 5 immediate release tablet, Take 1 tablet (5 mg total) by mouth every 4 (four) hours as needed for moderate pain Max Daily Amount: 30 mg, Disp: 30 tablet, Rfl: 0    potassium chloride (MICRO-K) 10 MEQ CR capsule, Take 4 capsules (40 mEq total) by mouth 2 (two) times a day, Disp: 240 capsule, Rfl: 1    predniSONE 10 mg tablet, Take 2 tablets (20 mg total) by mouth daily, Disp: 120 tablet, Rfl: 0    acetaminophen (TYLENOL) 325 mg tablet, Take 650 mg by mouth every 6 (six) hours as needed for mild pain (Patient not taking: Reported on 4/6/2023), Disp: , Rfl:     acetaminophen (TYLENOL) 325 mg tablet, Take 2 tablets (650 mg total) by mouth every 4 (four) hours as needed for mild pain (Patient not taking: Reported on 4/6/2023), Disp: 30 tablet, Rfl: 0    amLODIPine (NORVASC) 5 mg tablet, Take 5 mg by mouth daily (Patient not taking: Reported on 3/13/2023), Disp: , Rfl:     CAPECITABINE PO, Take 1,650 mg by mouth 2 (two) times a day (Patient not taking: Reported on 3/17/2023), Disp: , Rfl:     diclofenac sodium (VOLTAREN) 50 mg EC tablet, Take 1 tablet (50 mg total) by mouth 3 (three) times a day for 7 days (Patient not taking: Reported on 4/6/2023), Disp: 21 tablet, Rfl: 0    docusate sodium (COLACE) 100 mg capsule, Take 1 capsule (100 mg total) by mouth 2 (two) times a day for 15 days (Patient not taking: Reported on 4/6/2023), Disp: 30 capsule, Rfl: 0    oxybutynin (DITROPAN) 5 mg tablet, TAKE 1 TABLET (5 MG TOTAL) BY MOUTH EVERY 6 (SIX) HOURS AS NEEDED (BLADDER SPASMS) (Patient not taking: Reported on 5/3/2023), Disp: 30 tablet, Rfl: 0    Potassium Gluconate 550 MG TABS, Take 1 tablet by mouth in the morning (Patient not taking: Reported on 3/23/2023), Disp: , Rfl:     tamsulosin (FLOMAX) 0 4 mg, Take 1 capsule (0 4 mg total) by mouth daily with dinner (Patient not taking: Reported on 4/6/2023), Disp: 15 capsule, Rfl: 0  No current facility-administered medications for this visit  Facility-Administered Medications Ordered in Other Visits:     alteplase (CATHFLO) injection 2 mg, 2 mg, Intracatheter, Q1MIN PRN, Cammie Israel MD    Allergies   Allergen Reactions    Oxaliplatin Shortness Of Breath     Reactions occurred with 2nd and 3rd infusions (about 1-3 hours from initiation of infusion) and required treatment with steroids and antihistamines  Please refer to allergy note on 2/7/2023 for detailed description of her reactions  Vitals:    05/03/23 0952   BP: 144/80   Pulse: 90   Resp: 16   Temp: (!) 97 3 °F (36 3 °C)   SpO2: 97%     Physical Exam  Constitutional:       Appearance: She is well-developed  HENT:      Head: Normocephalic and atraumatic  Right Ear: External ear normal       Left Ear: External ear normal    Eyes:      Conjunctiva/sclera: Conjunctivae normal       Pupils: Pupils are equal, round, and reactive to light  Cardiovascular:      Rate and Rhythm: Normal rate and regular rhythm  Heart sounds: Normal heart sounds  Pulmonary:      Effort: Pulmonary effort is normal       Breath sounds: Normal breath sounds  Comments: Clear bilaterally  Abdominal:      General: Bowel sounds are normal       Palpations: Abdomen is soft        Comments: + Bowel sounds, left upper quadrant ostomy in place, nontender, distended, no fluid, no rigidity or rebound   Musculoskeletal:         General: Normal range of motion  Cervical back: Normal range of motion and neck supple  Skin:     General: Skin is warm  Comments: Relatively good color, warm, moist, no petechiae or ecchymoses   Neurological:      Mental Status: She is alert and oriented to person, place, and time  Deep Tendon Reflexes: Reflexes are normal and symmetric  Psychiatric:         Behavior: Behavior normal          Thought Content: Thought content normal          Judgment: Judgment normal      Extremities: No adenopathy in the neck, supraclavicular chain, axilla bilaterally  Lymphatics: no adenopathy in the neck, supraclavicular region, axilla and groin bilaterally    Performance Status: 1 - Symptomatic but completely ambulatory    Labs    4/29/2023 CEA = 1 5  4/29/2023 WBC = 4 58 hemoglobin = 11 1 hematocrit = 36 6 platelet = 840 neutrophil = 84% potassium = 2 8 BUN = 19 creatinine = 0 73 alkaline phosphatase = 315    4/6/2023 hereditary hemochromatosis mutation analysis = no mutations identified    3/14/2023 WBC = 5 84 hemoglobin = 11 4 hematocrit = 36 6 MCV = 100 platelet = 445  2/74/3585 potassium = 2 8 BUN = 14 creatinine = 0 82 glucose = 293 calcium = 9 0 AST = 49 ALT = 58 alkaline phosphatase = 366 total protein = 6 2 total bilirubin = 1 3    Imaging    3/18/2023 CAT scan chest abdomen pelvis with contrast    IMPRESSION:     1  Probably right upper lobe interstitial opacities are nonspecific and could be due to infection or inflammation of the appropriate clinical context or fibrosis  Malignancy is felt to be less likely but not excluded  May consider short-term follow-up imaging in 2-3 months for reassessment  2   Consolidative changes in the left lower lobe and inferior lingula may be due to atelectasis    3   1 6 cm heterogeneous nodule the right lobe of the thyroid gland, recommend correlation with thyroid ultrasound if not obtained within the prior 6 months  4   There is an ill-defined area of hypoattenuation within the inferior right hepatic lobe of uncertain etiology  Not appreciated on the previous exam   Consider further assessment with contrast-enhanced MRI  5   Bilateral ureteral stents with calcifications suspected along the proximal right ureteral stent few scattered calcifications along the mid to distal left ureteral stent  6   Additional findings discussed above        3/17/2023 MRI pelvics rectal cancer staging    IMPRESSION:  Unenhanced MRI of the Pelvis (Rectal Protocol)      SINCE MRI PELVIS RECTAL CANCER STAGING DATED 11/1/2022, POST TREATMENT PRIMARY TUMOR ASSESSMENT: Incomplete response (likely residual tumor ) Please note, though incomplete, there has been substantial tumor response to treatment with a significant   decrease in size of the high rectal cancer since MRI 11/1/2022       SUSPICIOUS MESORECTAL LYMPH NODES: No      SUSPICIOUS EXTRAMESORECTAL LYMPH NODES: No            2/11/2023 CAT scan abdomen pelvis with contrast    IMPRESSION:     Interval decrease in right-sided hydronephrosis      Thickening of the ascending colon and cecum which may represent colitis  Follow-up with gastroenterology      Stable 1 cm right adrenal gland nodule  Although its imaging features are indeterminate, it does not have suspicious imaging features (heterogeneity, necrosis, irregular margins), therefore this is likely benign, and can be followed by non-contrast abdomen CT or MRI in 12 months  If patient has history of adrenal hyperfunction, consider biochemical evaluation       Pathology    Case Report   Surgical Pathology Report                         Case: [id number]                                    Authorizing Provider: Art Betancourt MD           Collected:           02/22/2023 0731               Ordering Location:     [address] [place] [address]      Received:            02/22/2023 0                                      [facility]                                                                                   Laboratory                                                                    Pathologist:           Bella Lerma MD                                                                  Specimen:    Colon, Rectosigmoid Colon Mass Biopsy (1 slide [id number] [address] [facility], collected 10/28/2022)                                                              Final Diagnosis   A   Colon, Rectosigmoid Colon Mass Biopsy (1 slide [address] , collected 10/28/2022):  - Adenocarcinoma arising in a tubular adenoma      Note: Per outside report (slides were not available for review)       RESULTS OF IMMUNOHISTOCHEMICAL ANALYSIS FOR MISMATCH REPAIR PROTEIN LOSS     RESULTS:  Antibody            Clone                 Description                     Results  MLH1                 M1                     Mismatch repair protein  Intact nuclear expression  MSH2                [id number]        Mismatch repair protein  Intact nuclear expression  MSH6                SP93                  Mismatch repair protein  Intact nuclear expression  PMS2                 A16-4                 Mismatch repair protein  Intact nuclear expression      Electronically signed by Bella Lerma MD on 2/22/2023 at 10:11 AM

## 2023-05-04 ENCOUNTER — OFFICE VISIT (OUTPATIENT)
Dept: GASTROENTEROLOGY | Facility: AMBULARY SURGERY CENTER | Age: 58
End: 2023-05-04

## 2023-05-04 ENCOUNTER — APPOINTMENT (OUTPATIENT)
Dept: RADIATION ONCOLOGY | Facility: HOSPITAL | Age: 58
End: 2023-05-04
Attending: STUDENT IN AN ORGANIZED HEALTH CARE EDUCATION/TRAINING PROGRAM

## 2023-05-04 VITALS
WEIGHT: 149.6 LBS | SYSTOLIC BLOOD PRESSURE: 152 MMHG | BODY MASS INDEX: 28.25 KG/M2 | DIASTOLIC BLOOD PRESSURE: 78 MMHG | HEART RATE: 93 BPM | OXYGEN SATURATION: 99 % | HEIGHT: 61 IN

## 2023-05-04 DIAGNOSIS — K76.9 LIVER LESION: ICD-10-CM

## 2023-05-04 DIAGNOSIS — D86.9 SARCOIDOSIS: ICD-10-CM

## 2023-05-04 DIAGNOSIS — R74.8 ELEVATED LIVER ENZYMES: Primary | ICD-10-CM

## 2023-05-04 NOTE — H&P (VIEW-ONLY)
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) and adjuvant chemoXRT, recurrent NISHA, nephrolithiasis, biopsy-proven pulmonary sarcoidosis on steroids who presents for initial evaluation for abnormal liver tests  She has no acute liver specific complaints or clinical evidence of hepatic decompensation  She was noted to have progressive cholestasis dating back to December 2022 with intraoperative findings of diffuse hepatic nodules suggestive of cirrhosis  She underwent a wedge biopsy of segment 3 which showed hyalinized subcapsular and intra-parenchymal nodules with no malignancy  There was mild macrovesicular steatosis without steatohepatitis or advanced fibrosis  Pathology was suggestive of a sclerotic hemangioma with secondary hemochromatosis  Based on these findings, I am suspicious of hepatic involvement of her sarcoidosis which can present with chronic cholestasis and micronodular liver appearance  Other possibilities include PBC and possibly [address] in the context of platinum-based chemotherapy, although these findings were seen prior to her chemotherapy  Will complete her serologic work-up to exclude other causes of intrahepatic cholestasis and consider MRI/MRCP if there is concern for biliary obstruction  She is not on any typical culprits associated with DILI  She has a liver biopsy of a liver lesion that is suspicious for metastasis  I discussed with IR to biopsy her background liver as well to see if she has granulomatous hepatitis  If hepatic sarcoidosis is confirmed, I would recommend starting ursodiol and increasing her prednisone   Will also refer her rheumatology and pulmonology for management of her sarcoidosis  Otherwise, she should avoid interval weight gain, excessive EtOH consumption and hepatotoxic medications  She will return to clinic in 2 months or sooner if needed  Thank you for the opportunity to consult in her care  - Liver biopsy   - BLANCA, ASMA, AMA and quantitative immunoglobulins  - IgG4 levels  - QT gold and RPR/FTA  - HAV IgG, HBsAg, HBcAb, HBsAb and HCV Ab  - Iron panel  - A1AT levels and phenotype  - AFP and CA 19-9  - Pulmonology and Rheumatology referral     Pilar Andres MD  Division of Gastroenterology and Hepatology  [address] [place] [address]    ============================================================================  CC/HPI: 56 y o  female with 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) and adjuvant chemoXRT, recurrent NISHA, nephrolithiasis, pulmonary sarcoidosis on steroids who presents for initial evaluation for abnormal liver tests  She was diagnosed with pulmonary sarcoidosis in June 2022 when she was noted to have hypercalcemia and found to have diffuse micronodules involving both lungs with bilateral hilar, retroperitoneal and inguinal lymphadenopathy on PET/CT  There was no abnormal activity in the hepatobiliary system but there were reports of focal hypermetabolic activity in the lower sigmoid colon for which colonoscopy was recommended but no pursued  She had a left VATS and pleural biopsy in June 2022 which showed non-caseating granulomatous inflammation negative for Mycobacteria and fungal infections  She was started on prednisone in July 2022  In October 2022, she was hospitalized for abdominal pain and was found to have sigmoid colon mass  She underwent COY in 10/28/2022 which showed malignant partially obstructing tumor in the rectosigmoid colon for which biopsies were positive for adenocarcinoma   She underwent a diverting colostomy in December 2022 when she presented with obstructive symptoms and was noted to have abnormal morphology of the liver  Her liver had cirrhotic appearance with micronodular pattern through both lobes during her colostomy and underwent a wedge biopsy of segment 3  Pathology showed hyalized subcapsular and intra-parenchymal nodules with no malignancy but mild macrovesicular steatosis without steatohepatitis but mild portal fibrosis seen  Findings were most consistent with sclerotic hemangioma with secondary hemochromatosis  She was started on oxalipatin which was then switched to 5-FU in 4/2023  She was noted to have progressive cholestasis in December 2022 with recent   More recently, she had an MRI abdomen in March 2023 and was found to have a 1 6 cm segment 6 lesion with hypoenhancement in the arterial and portal venous phase with isointensity in the delayed phase, concerning for metastases  She is scheduled for liver biopsy on 5/9  She denies excessive EtOH consumption and recreational drug use  She has no occupational exposures and denies supplement use  She has a family history of alcohol cirrhosis  She is otherwise asymptomatic and denies jaundice, prurtius, darkened urine and acholic stools  ROS: Complete review of systems otherwise negative       PAST MEDICAL/SURGICAL HISTORY:  Past Medical History:   Diagnosis Date   • Cancer [facility])    • Colon cancer ([facility] )    • Fall    • Headache    • Hemochromatosis, unspecified    • History of transfusion     2022 - no adverse reaction   • Kidney calculi    • Kidney stone    • Liver disease     hemangioma   • Muscle weakness    • Personal history of COVID-19 12/2022   • Sarcoidosis    • Seizures ([facility] [address] )     2022        Past Surgical History:   Procedure Laterality Date   • ABSCESS DRAINAGE      left breast   • CHEST TUBE INSERTION     • COLON SURGERY      colostomy   • COLONOSCOPY     • FL GUIDED CENTRAL VENOUS ACCESS DEVICE INSERTION  3/21/2023   • FL RETROGRADE PYELOGRAM  1/23/2023   • FL RETROGRADE PYELOGRAM  4/3/2023   • LUNG BIOPSY     • WA CYSTO/URETERO W/LITHOTRIPSY &INDWELL STENT INSRT Bilateral 1/23/2023    Procedure: CYSTOSCOPY  RIGHT URETEROSCOPY WITH LITHOTRIPSY HOLMIUM LASER, BILATERAL RETROGRADE PYELOGRAM AND BILATERAL  URETERAL STENT INSERTION;  Surgeon: Roxie Long MD;  [facility];  Service: Urology   • WA CYSTO/URETERO W/LITHOTRIPSY &INDWELL STENT INSRT Right 4/3/2023    Procedure: RIGHT URETEROSCOPY WITH LITHOTRIPSY HOLMIUM LASER, RETROGRADE PYELOGRAM; BILATERAL EXCHANGE STENT URETERAL;  Surgeon: Roxie Long MD;  Location[facility];  Service: Urology   • WA INSJ TUNNELED CTR VAD W/SUBQ PORT AGE 5 YR/> N/A 3/21/2023    Procedure: INSERTION VENOUS PORT ( PORT-A-CATH) IR;  Surgeon: Promise Villalpando DO;  [facility];  Service: Interventional Radiology   • TONSILLECTOMY     • URINARY SURGERY Bilateral     bilateral stents       FAMILY/SOCIAL HISTORY:  Family History   Problem Relation Age of Onset   • Cancer Mother    • Cirrhosis Father        Social History     Tobacco Use   • Smoking status: Never     Passive exposure: Past   • Smokeless tobacco: Never   Vaping Use   • Vaping Use: Never used   Substance Use Topics   • Alcohol use: Never   • Drug use: Never       MEDICATIONS:  Current Outpatient Medications on File Prior to Visit   Medication Sig Dispense Refill   • acetaminophen (TYLENOL) 325 mg tablet Take 2 tablets (650 mg total) by mouth every 4 (four) hours as needed for mild pain 30 tablet 0   • cyanocobalamin (VITAMIN B-12) 1000 MCG tablet Take 1,000 mcg by mouth daily     • fluorouracil 1,880 mg in CADD/Elastomeric Infusion Device Infuse 1,880 mg (225 mg/m2/day x 1 67 m2) into a catheter in a vein via infusion device over 120 hours for 5 days  Infusion planned for May 3, 2023  1 each 0   • oxyCODONE (Roxicodone) 5 immediate release tablet Take 1 tablet (5 mg total) by mouth every 4 (four) hours as needed for moderate pain Max Daily Amount: 30 mg 30 tablet 0   • potassium chloride (MICRO-K) 10 MEQ CR capsule Take 4 capsules (40 mEq total) by mouth 2 (two) times a day 240 capsule 1   • predniSONE 10 mg tablet Take 2 tablets (20 mg total) by mouth daily 120 tablet 0   • acetaminophen (TYLENOL) 325 mg tablet Take 650 mg by mouth every 6 (six) hours as needed for mild pain (Patient not taking: Reported on 5/4/2023)     • amLODIPine (NORVASC) 5 mg tablet Take 5 mg by mouth daily (Patient not taking: Reported on 3/13/2023)     • CAPECITABINE PO Take 1,650 mg by mouth 2 (two) times a day (Patient not taking: Reported on 3/17/2023)     • diclofenac sodium (VOLTAREN) 50 mg EC tablet Take 1 tablet (50 mg total) by mouth 3 (three) times a day for 7 days (Patient not taking: Reported on 4/6/2023) 21 tablet 0   • docusate sodium (COLACE) 100 mg capsule Take 1 capsule (100 mg total) by mouth 2 (two) times a day for 15 days (Patient not taking: Reported on 4/6/2023) 30 capsule 0   • oxybutynin (DITROPAN) 5 mg tablet TAKE 1 TABLET (5 MG TOTAL) BY MOUTH EVERY 6 (SIX) HOURS AS NEEDED (BLADDER SPASMS) (Patient not taking: Reported on 5/3/2023) 30 tablet 0   • Potassium Gluconate 550 MG TABS Take 1 tablet by mouth in the morning (Patient not taking: Reported on 3/23/2023)     • tamsulosin (FLOMAX) 0 4 mg Take 1 capsule (0 4 mg total) by mouth daily with dinner (Patient not taking: Reported on 4/6/2023) 15 capsule 0     Current Facility-Administered Medications on File Prior to Visit   Medication Dose Route Frequency Provider Last Rate Last Admin   • [DISCONTINUED] alteplase (CATHFLO) injection 2 mg  2 mg Intracatheter Q1MIN PRN Mayte Rodriguez MD           Allergies   Allergen Reactions   • Oxaliplatin Shortness Of Breath     Reactions occurred with 2nd and 3rd infusions (about 1-3 hours from initiation of infusion) and required treatment with steroids and antihistamines   Please refer to allergy note on 2/7/2023 for detailed description of "her reactions  PHYSICAL EXAM:  /78 (BP Location: Right arm, Patient Position: Sitting, Cuff Size: Standard)   Pulse 93   Ht 5' 1\" (1 549 m)   Wt 67 9 kg (149 lb 9 6 oz)   SpO2 99%   BMI 28 27 kg/m²   GENERAL: NAD, AAO  HEENT: anicteric, OP clear, MMM  ABDOMEN: ostomy c/d/i, normoactive BS, no hepatomegaly, spleen not palpable  EXTREMITIES: no edema  SKIN: no rashes, no palmar erythema, no spider angiomata   NEURO: normal gait, no tremor, no asterixis     LABS/RADIOLOGY/ENDOSCOPY:  Lab Results   Component Value Date    WBC 4 58 04/29/2023    HGB 11 1 (L) 04/29/2023    HCT 36 6 04/29/2023     (L) 04/29/2023    GLUF 92 03/14/2023    BUN 19 04/29/2023    CREATININE 0 73 04/29/2023    K 2 8 (L) 04/29/2023     04/29/2023    CO2 27 04/29/2023    ALKPHOS 315 (H) 04/29/2023    ALT 47 04/29/2023    AST 37 04/29/2023    CALCIUM 9 8 04/29/2023    EGFR 93 04/29/2023    INR 1 06 02/11/2023    PTT 25 02/11/2023     PET/CT (5/2022)    1  Innumerable micronodules scattered throughout both lungs in a random distribution demonstrating mild FDG uptake  Findings are nonspecific and may represent diffuse infectious or inflammatory process, less likely neoplastic process  Follow-up bronchoscopy may be useful for further evaluation  2  Peripheral consolidation with adjacent pleural thickening posterior left lung base demonstrating hypermetabolic activity probably representing an inflammatory process, however a neoplastic process cannot be excluded  3  Nonspecific diffuse FDG uptake within the concetta bilaterally  Two tiny FDG avid para tracheal lymph nodes in the superior left mediastinum of uncertain clinical significance  No other pathologically enlarged or hypermetabolic thoracic lymphadenopathy  4  Focal hypermetabolic activity involving the lower sigmoid colon corresponding to a short segment of circumferential colonic wall thickening (versus underdistended colon)   Recommend correlation with " colonoscopy to rule out a neoplastic annular lesion in this location if one has not recently been performed  5  Several borderline-enlarged retroperitoneal and bilateral external iliac lymph nodes, as well as scattered subcentimeter bilateral superficial inguinal lymph nodes demonstrating low-level FDG uptake (similar to background liver activity) which are nonspecific and may represent reactive lymph nodes  MRI abdomen (3/2023)  Corresponding to the finding on recent CT, there is a 1 6 x 1 5 cm right hepatic lobe segment 6 lesion with imaging characteristics as described above  Because it is new compared to the 1/26/2023 CT, and it does not have clearly benign features, it is   suspicious for metastasis  Lesion best seen on series 5 image 28, and series 11 images 81 and 197      LIVER, SEGMENT 3, RESECTION:  - HYALINIZED SUBCAPSULAR AND INTRA-PARENCHYMAL NODULES  NO MALIGNANCY SEEN  SEE NOTE  - MILD MACROVESICULAR STEATOSIS (25%)  NO EVIDENCE OF STEATOHEPATITIS  TRICHOME STAIN SHOWS MILD PORTAL FIBROSIS    - 2+ IRON DEPOSITION WITHIN KUPFFER CELLS (IRON STAIN), CONSISTENT WITH SECONDARY HEMOCHROMATOSIS       NOTE: THIS LIKELY REPRESENTS A MARKEDLY SCLEROTIC HEMANGIOMA OR CHANGES SECONDARY TO INJURY    MELD-Na score: 9 at 2/13/2023  8:15 AM  MELD score: 9 at 2/13/2023  8:15 AM  Calculated from:  Serum Creatinine: 0 52 mg/dL (Using min of 1 mg/dL) at 2/13/2023  8:15 AM  Serum Sodium: 139 mmol/L (Using max of 137 mmol/L) at 2/13/2023  8:15 AM  Total Bilirubin: 1 35 mg/dL at 2/11/2023  4:35 AM  INR(ratio): 1 06 at 2/11/2023  4:35 AM  Age: 57 years

## 2023-05-04 NOTE — PROGRESS NOTES
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) and adjuvant chemoXRT, recurrent NISHA, nephrolithiasis, biopsy-proven pulmonary sarcoidosis on steroids who presents for initial evaluation for abnormal liver tests  She has no acute liver specific complaints or clinical evidence of hepatic decompensation  She was noted to have progressive cholestasis dating back to December 2022 with intraoperative findings of diffuse hepatic nodules suggestive of cirrhosis  She underwent a wedge biopsy of segment 3 which showed hyalinized subcapsular and intra-parenchymal nodules with no malignancy  There was mild macrovesicular steatosis without steatohepatitis or advanced fibrosis  Pathology was suggestive of a sclerotic hemangioma with secondary hemochromatosis  Based on these findings, I am suspicious of hepatic involvement of her sarcoidosis which can present with chronic cholestasis and micronodular liver appearance  Other possibilities include PBC and possibly [address] in the context of platinum-based chemotherapy, although these findings were seen prior to her chemotherapy  Will complete her serologic work-up to exclude other causes of intrahepatic cholestasis and consider MRI/MRCP if there is concern for biliary obstruction  She is not on any typical culprits associated with DILI  She has a liver biopsy of a liver lesion that is suspicious for metastasis  I discussed with IR to biopsy her background liver as well to see if she has granulomatous hepatitis  If hepatic sarcoidosis is confirmed, I would recommend starting ursodiol and increasing her prednisone   Will also refer her rheumatology and pulmonology for management of her sarcoidosis  Otherwise, she should avoid interval weight gain, excessive EtOH consumption and hepatotoxic medications  She will return to clinic in 2 months or sooner if needed  Thank you for the opportunity to consult in her care  - Liver biopsy   - BLANCA, ASMA, AMA and quantitative immunoglobulins  - IgG4 levels  - QT gold and RPR/FTA  - HAV IgG, HBsAg, HBcAb, HBsAb and HCV Ab  - Iron panel  - A1AT levels and phenotype  - AFP and CA 19-9  - Pulmonology and Rheumatology referral     Marylu Tipton MD  Division of Gastroenterology and Hepatology  [address] [place] [address]    ============================================================================  CC/HPI: 56 y o  female with 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) and adjuvant chemoXRT, recurrent NISHA, nephrolithiasis, pulmonary sarcoidosis on steroids who presents for initial evaluation for abnormal liver tests  She was diagnosed with pulmonary sarcoidosis in June 2022 when she was noted to have hypercalcemia and found to have diffuse micronodules involving both lungs with bilateral hilar, retroperitoneal and inguinal lymphadenopathy on PET/CT  There was no abnormal activity in the hepatobiliary system but there were reports of focal hypermetabolic activity in the lower sigmoid colon for which colonoscopy was recommended but no pursued  She had a left VATS and pleural biopsy in June 2022 which showed non-caseating granulomatous inflammation negative for Mycobacteria and fungal infections  She was started on prednisone in July 2022  In October 2022, she was hospitalized for abdominal pain and was found to have sigmoid colon mass  She underwent COY in 10/28/2022 which showed malignant partially obstructing tumor in the rectosigmoid colon for which biopsies were positive for adenocarcinoma   She underwent a diverting colostomy in December 2022 when she presented with obstructive symptoms and was noted to have abnormal morphology of the liver  Her liver had cirrhotic appearance with micronodular pattern through both lobes during her colostomy and underwent a wedge biopsy of segment 3  Pathology showed hyalized subcapsular and intra-parenchymal nodules with no malignancy but mild macrovesicular steatosis without steatohepatitis but mild portal fibrosis seen  Findings were most consistent with sclerotic hemangioma with secondary hemochromatosis  She was started on oxalipatin which was then switched to 5-FU in 4/2023  She was noted to have progressive cholestasis in December 2022 with recent   More recently, she had an MRI abdomen in March 2023 and was found to have a 1 6 cm segment 6 lesion with hypoenhancement in the arterial and portal venous phase with isointensity in the delayed phase, concerning for metastases  She is scheduled for liver biopsy on 5/9  She denies excessive EtOH consumption and recreational drug use  She has no occupational exposures and denies supplement use  She has a family history of alcohol cirrhosis  She is otherwise asymptomatic and denies jaundice, prurtius, darkened urine and acholic stools  ROS: Complete review of systems otherwise negative       PAST MEDICAL/SURGICAL HISTORY:  Past Medical History:   Diagnosis Date    Cancer [facility])     Colon cancer ([facility] [address] )     Fall     Headache     Hemochromatosis, unspecified     History of transfusion     2022 - no adverse reaction    Kidney calculi     Kidney stone     Liver disease     hemangioma    Muscle weakness     Personal history of COVID-19 12/2022    Sarcoidosis     Seizures ([facility] [address] )     2022        Past Surgical History:   Procedure Laterality Date    ABSCESS DRAINAGE      left breast    CHEST TUBE INSERTION      COLON SURGERY      colostomy    COLONOSCOPY      FL GUIDED CENTRAL VENOUS ACCESS DEVICE INSERTION  3/21/2023    FL RETROGRADE PYELOGRAM  1/23/2023    FL RETROGRADE PYELOGRAM  4/3/2023    LUNG BIOPSY      ME CYSTO/URETERO W/LITHOTRIPSY &INDWELL STENT INSRT Bilateral 1/23/2023    Procedure: CYSTOSCOPY  RIGHT URETEROSCOPY WITH LITHOTRIPSY HOLMIUM LASER, BILATERAL RETROGRADE PYELOGRAM AND BILATERAL  URETERAL STENT INSERTION;  Surgeon: Jeff Lozano MD;  [facility];  Service: Urology    ME CYSTO/URETERO W/LITHOTRIPSY &INDWELL STENT INSRT Right 4/3/2023    Procedure: RIGHT URETEROSCOPY WITH LITHOTRIPSY HOLMIUM LASER, RETROGRADE PYELOGRAM; BILATERAL EXCHANGE STENT URETERAL;  Surgeon: Jeff Lozano MD;  Location: [facility];  Service: Urology    ME INSJ TUNNELED CTR VAD W/SUBQ PORT AGE 5 YR/> N/A 3/21/2023    Procedure: INSERTION VENOUS PORT ( PORT-A-CATH) IR;  Surgeon: Bubba Cummings DO;  [facility];  Service: Interventional Radiology    TONSILLECTOMY      URINARY SURGERY Bilateral     bilateral stents       FAMILY/SOCIAL HISTORY:  Family History   Problem Relation Age of Onset    Cancer Mother     Cirrhosis Father        Social History     Tobacco Use    Smoking status: Never     Passive exposure: Past    Smokeless tobacco: Never   Vaping Use    Vaping Use: Never used   Substance Use Topics    Alcohol use: Never    Drug use: Never       MEDICATIONS:  Current Outpatient Medications on File Prior to Visit   Medication Sig Dispense Refill    acetaminophen (TYLENOL) 325 mg tablet Take 2 tablets (650 mg total) by mouth every 4 (four) hours as needed for mild pain 30 tablet 0    cyanocobalamin (VITAMIN B-12) 1000 MCG tablet Take 1,000 mcg by mouth daily      fluorouracil 1,880 mg in CADD/Elastomeric Infusion Device Infuse 1,880 mg (225 mg/m2/day x 1 67 m2) into a catheter in a vein via infusion device over 120 hours for 5 days  Infusion planned for May 3, 2023  1 each 0    oxyCODONE (Roxicodone) 5 immediate release tablet Take 1 tablet (5 mg total) by mouth every 4 (four) hours as needed for moderate pain Max Daily Amount: 30 mg 30 tablet 0    potassium chloride (MICRO-K) 10 MEQ CR capsule Take 4 capsules (40 mEq total) by mouth 2 (two) times a day 240 capsule 1    predniSONE 10 mg tablet Take 2 tablets (20 mg total) by mouth daily 120 tablet 0    acetaminophen (TYLENOL) 325 mg tablet Take 650 mg by mouth every 6 (six) hours as needed for mild pain (Patient not taking: Reported on 5/4/2023)      amLODIPine (NORVASC) 5 mg tablet Take 5 mg by mouth daily (Patient not taking: Reported on 3/13/2023)      CAPECITABINE PO Take 1,650 mg by mouth 2 (two) times a day (Patient not taking: Reported on 3/17/2023)      diclofenac sodium (VOLTAREN) 50 mg EC tablet Take 1 tablet (50 mg total) by mouth 3 (three) times a day for 7 days (Patient not taking: Reported on 4/6/2023) 21 tablet 0    docusate sodium (COLACE) 100 mg capsule Take 1 capsule (100 mg total) by mouth 2 (two) times a day for 15 days (Patient not taking: Reported on 4/6/2023) 30 capsule 0    oxybutynin (DITROPAN) 5 mg tablet TAKE 1 TABLET (5 MG TOTAL) BY MOUTH EVERY 6 (SIX) HOURS AS NEEDED (BLADDER SPASMS) (Patient not taking: Reported on 5/3/2023) 30 tablet 0    Potassium Gluconate 550 MG TABS Take 1 tablet by mouth in the morning (Patient not taking: Reported on 3/23/2023)      tamsulosin (FLOMAX) 0 4 mg Take 1 capsule (0 4 mg total) by mouth daily with dinner (Patient not taking: Reported on 4/6/2023) 15 capsule 0     Current Facility-Administered Medications on File Prior to Visit   Medication Dose Route Frequency Provider Last Rate Last Admin    [DISCONTINUED] alteplase (CATHFLO) injection 2 mg  2 mg Intracatheter Q1MIN PRN Dee Dee Davidson MD           Allergies   Allergen Reactions    Oxaliplatin Shortness Of Breath     Reactions occurred with 2nd and 3rd infusions (about 1-3 hours from initiation of infusion) and required treatment with steroids and antihistamines   Please refer to allergy note on 2/7/2023 for detailed description of "her reactions  PHYSICAL EXAM:  /78 (BP Location: Right arm, Patient Position: Sitting, Cuff Size: Standard)   Pulse 93   Ht 5' 1\" (1 549 m)   Wt 67 9 kg (149 lb 9 6 oz)   SpO2 99%   BMI 28 27 kg/m²   GENERAL: NAD, AAO  HEENT: anicteric, OP clear, MMM  ABDOMEN: ostomy c/d/i, normoactive BS, no hepatomegaly, spleen not palpable  EXTREMITIES: no edema  SKIN: no rashes, no palmar erythema, no spider angiomata   NEURO: normal gait, no tremor, no asterixis     LABS/RADIOLOGY/ENDOSCOPY:  Lab Results   Component Value Date    WBC 4 58 04/29/2023    HGB 11 1 (L) 04/29/2023    HCT 36 6 04/29/2023     (L) 04/29/2023    GLUF 92 03/14/2023    BUN 19 04/29/2023    CREATININE 0 73 04/29/2023    K 2 8 (L) 04/29/2023     04/29/2023    CO2 27 04/29/2023    ALKPHOS 315 (H) 04/29/2023    ALT 47 04/29/2023    AST 37 04/29/2023    CALCIUM 9 8 04/29/2023    EGFR 93 04/29/2023    INR 1 06 02/11/2023    PTT 25 02/11/2023     PET/CT (5/2022)    1  Innumerable micronodules scattered throughout both lungs in a random distribution demonstrating mild FDG uptake  Findings are nonspecific and may represent diffuse infectious or inflammatory process, less likely neoplastic process  Follow-up bronchoscopy may be useful for further evaluation  2  Peripheral consolidation with adjacent pleural thickening posterior left lung base demonstrating hypermetabolic activity probably representing an inflammatory process, however a neoplastic process cannot be excluded  3  Nonspecific diffuse FDG uptake within the concetta bilaterally  Two tiny FDG avid para tracheal lymph nodes in the superior left mediastinum of uncertain clinical significance  No other pathologically enlarged or hypermetabolic thoracic lymphadenopathy  4  Focal hypermetabolic activity involving the lower sigmoid colon corresponding to a short segment of circumferential colonic wall thickening (versus underdistended colon)   Recommend correlation with " colonoscopy to rule out a neoplastic annular lesion in this location if one has not recently been performed  5  Several borderline-enlarged retroperitoneal and bilateral external iliac lymph nodes, as well as scattered subcentimeter bilateral superficial inguinal lymph nodes demonstrating low-level FDG uptake (similar to background liver activity) which are nonspecific and may represent reactive lymph nodes  MRI abdomen (3/2023)  Corresponding to the finding on recent CT, there is a 1 6 x 1 5 cm right hepatic lobe segment 6 lesion with imaging characteristics as described above  Because it is new compared to the 1/26/2023 CT, and it does not have clearly benign features, it is   suspicious for metastasis  Lesion best seen on series 5 image 28, and series 11 images 81 and 197      LIVER, SEGMENT 3, RESECTION:  - HYALINIZED SUBCAPSULAR AND INTRA-PARENCHYMAL NODULES  NO MALIGNANCY SEEN  SEE NOTE  - MILD MACROVESICULAR STEATOSIS (25%)  NO EVIDENCE OF STEATOHEPATITIS  TRICHOME STAIN SHOWS MILD PORTAL FIBROSIS    - 2+ IRON DEPOSITION WITHIN KUPFFER CELLS (IRON STAIN), CONSISTENT WITH SECONDARY HEMOCHROMATOSIS       NOTE: THIS LIKELY REPRESENTS A MARKEDLY SCLEROTIC HEMANGIOMA OR CHANGES SECONDARY TO INJURY    MELD-Na score: 9 at 2/13/2023  8:15 AM  MELD score: 9 at 2/13/2023  8:15 AM  Calculated from:  Serum Creatinine: 0 52 mg/dL (Using min of 1 mg/dL) at 2/13/2023  8:15 AM  Serum Sodium: 139 mmol/L (Using max of 137 mmol/L) at 2/13/2023  8:15 AM  Total Bilirubin: 1 35 mg/dL at 2/11/2023  4:35 AM  INR(ratio): 1 06 at 2/11/2023  4:35 AM  Age: 57 years

## 2023-05-05 ENCOUNTER — APPOINTMENT (OUTPATIENT)
Dept: RADIATION ONCOLOGY | Facility: HOSPITAL | Age: 58
End: 2023-05-05
Attending: STUDENT IN AN ORGANIZED HEALTH CARE EDUCATION/TRAINING PROGRAM

## 2023-05-05 DIAGNOSIS — D86.9 SARCOIDOSIS: Primary | ICD-10-CM

## 2023-05-05 LAB — HFE GENE MUT ANL BLD/T: NORMAL

## 2023-05-06 ENCOUNTER — HOSPITAL ENCOUNTER (OUTPATIENT)
Dept: INFUSION CENTER | Facility: CLINIC | Age: 58
Discharge: HOME | End: 2023-05-06

## 2023-05-06 DIAGNOSIS — C18.7 MALIGNANT NEOPLASM OF SIGMOID COLON (HCC): Primary | ICD-10-CM

## 2023-05-06 LAB
ALBUMIN SERPL BCP-MCNC: 3.6 G/DL (ref 3.5–5)
ALP SERPL-CCNC: 286 U/L (ref 34–104)
ALT SERPL W P-5'-P-CCNC: 40 U/L (ref 7–52)
ANION GAP SERPL CALCULATED.3IONS-SCNC: 9 MMOL/L (ref 4–13)
AST SERPL W P-5'-P-CCNC: 33 U/L (ref 13–39)
BASOPHILS # BLD AUTO: 0.03 THOUSANDS/ÂΜL (ref 0–0.1)
BASOPHILS NFR BLD AUTO: 1 % (ref 0–1)
BILIRUB SERPL-MCNC: 1.19 MG/DL (ref 0.2–1)
BUN SERPL-MCNC: 22 MG/DL (ref 5–25)
CALCIUM SERPL-MCNC: 9.4 MG/DL (ref 8.4–10.2)
CHLORIDE SERPL-SCNC: 105 MMOL/L (ref 96–108)
CO2 SERPL-SCNC: 26 MMOL/L (ref 21–32)
CREAT SERPL-MCNC: 0.83 MG/DL (ref 0.6–1.3)
EOSINOPHIL # BLD AUTO: 0.13 THOUSAND/ÂΜL (ref 0–0.61)
EOSINOPHIL NFR BLD AUTO: 3 % (ref 0–6)
ERYTHROCYTE [DISTWIDTH] IN BLOOD BY AUTOMATED COUNT: 18 % (ref 11.6–15.1)
GFR SERPL CREATININE-BSD FRML MDRD: 79 ML/MIN/1.73SQ M
GLUCOSE SERPL-MCNC: 96 MG/DL (ref 65–140)
HCT VFR BLD AUTO: 36.9 % (ref 34.8–46.1)
HGB BLD-MCNC: 11.5 G/DL (ref 11.5–15.4)
IMM GRANULOCYTES # BLD AUTO: 0.03 THOUSAND/UL (ref 0–0.2)
IMM GRANULOCYTES NFR BLD AUTO: 1 % (ref 0–2)
LYMPHOCYTES # BLD AUTO: 0.34 THOUSANDS/ÂΜL (ref 0.6–4.47)
LYMPHOCYTES NFR BLD AUTO: 6 % (ref 14–44)
MCH RBC QN AUTO: 28 PG (ref 26.8–34.3)
MCHC RBC AUTO-ENTMCNC: 31.2 G/DL (ref 31.4–37.4)
MCV RBC AUTO: 90 FL (ref 82–98)
MONOCYTES # BLD AUTO: 0.44 THOUSAND/ÂΜL (ref 0.17–1.22)
MONOCYTES NFR BLD AUTO: 8 % (ref 4–12)
NEUTROPHILS # BLD AUTO: 4.31 THOUSANDS/ÂΜL (ref 1.85–7.62)
NEUTS SEG NFR BLD AUTO: 81 % (ref 43–75)
NRBC BLD AUTO-RTO: 0 /100 WBCS
PLATELET # BLD AUTO: 173 THOUSANDS/UL (ref 149–390)
PMV BLD AUTO: 9.2 FL (ref 8.9–12.7)
POTASSIUM SERPL-SCNC: 3 MMOL/L (ref 3.5–5.3)
PROT SERPL-MCNC: 6.6 G/DL (ref 6.4–8.4)
RBC # BLD AUTO: 4.1 MILLION/UL (ref 3.81–5.12)
SODIUM SERPL-SCNC: 140 MMOL/L (ref 135–147)
WBC # BLD AUTO: 5.28 THOUSAND/UL (ref 4.31–10.16)

## 2023-05-06 NOTE — PROGRESS NOTES
Patient to infusion for pump disconnect and labs for next treatment  She offers no complaints  Port flushed, labs drawn per protocol and de accessed    Next appointment confirmed, she declined AVS

## 2023-05-06 NOTE — PATIENT INSTRUCTIONS
May 2023      [place] Monday Tuesday Wednesday Thursday Friday Saturday        1    RADIATION DAILY TREATMENT   8:15 AM   (15 min )   AN  [address] Oncology    INF ONCOLOGY TX-TREATMENT PLAN   8:45 AM   (180 min )   AN INF CHEMO [address] 2    RADIATION DAILY TREATMENT   8:15 AM   (15 min )   AN  [address] Oncology 3    RADIATION ON TREATMENT VISIT   8:15 AM   (15 min )   Deepti Aviles MD   [facility] Radiation Oncology    RADIATION DAILY TREATMENT   8:15 AM   (15 min )   AN  [address] Oncology    FOLLOW UP PG   8:45 AM   (20 min )   MD Nadia Mae [facility] [place]    LAB WALK IN  10:35 AM   (5 min )   AN MOB PHLEB CHAIR 3   [address] [facility] [address] [facility] 4    RADIATION DAILY TREATMENT   8:15 AM   (15 min )   AN  [address] Oncology    HEPATOLOGY FOLLOW UP PG  10:45 AM   (30 min )   MD Nadia Higgins [facility] 5    RADIATION DAILY TREATMENT   8:15 AM   (15 min )   AN RAD [address] [place] [address] Radiation Oncology 6    INF ONCOLOGY TX-TREATMENT PLAN   8:45 AM   (30 min )   AN INF CHEMO [address]    Cycle 3, Day 1        7     8    INF ONCOLOGY TX-TREATMENT PLAN   8:00 AM   (90 min )   AN INF [place]    RADIATION DAILY TREATMENT   8:15 AM   (15 min )   AN RAD ONC RES   St  [address] Oncology 9    RADIATION ON TREATMENT VISIT   8:15 AM   (15 min )   Deepti Aviles MD   [facility] Oncology    RADIATION DAILY TREATMENT   8:15 AM   (15 min )   AN  [address] Oncology    IR BIOPSY LIVER MASS SED   8:30 AM   (75 min )   AN CT ED 1   St  [facility] CAT Scan    IR BIOPSY LIVER RANDOMS SED   9:45 AM   (60 min )   AN CT ED 1   [facility] CAT Scan 10    RADIATION DAILY TREATMENT   8:15 AM   (15 min )   AN RAD [address] [address] Radiation Oncology 11    RADIATION DAILY TREATMENT   8:15 AM   (15 min )   AN RAD ONC RES   [address] Radiation Oncology 12    RADIATION DAILY TREATMENT   8:15 AM   (15 min )   AN  [address] Oncology 13    INF ONCOLOGY TX-TREATMENT PLAN   8:00 AM   (30 min )   AN INF CHEMO [facility]    Cycle 4, Day 1        14     15    RADIATION DAILY TREATMENT   8:15 AM   (15 min )   AN  [address]    INF ONCOLOGY TX-TREATMENT PLAN   8:45 AM   (90 min )   AN INF CHEMO CHAIR   [address] 16    RADIATION DAILY TREATMENT   8:15 AM   (15 min )   AN RAD ONC RES   [facility] 17    RADIATION DAILY TREATMENT   8:15 AM   (15 min )   AN RAD ONC RES   St  [facility] Radiation Oncology 18    RADIATION ON TREATMENT VISIT   8:15 AM   (15 min )   [facility]    RADIATION DAILY TREATMENT   8:15 AM   (15 min )   AN RAD ONC RES   [facility] 19    RADIATION DAILY TREATMENT   8:15 AM   (15 min )   AN  [address] Oncology 20    INF ONCOLOGY TX-TREATMENT PLAN   9:00 AM   (30 min )   AN INF CHEMO [facility]    Cycle 5, Day 1        21     22    RADIATION DAILY TREATMENT   8:15 AM   (15 min )   AN RAD ONC RES   [address] Oncology 23    RADIATION DAILY TREATMENT   8:15 AM   (15 min )   AN RAD [address] Radiation Oncology 24    RADIATION DAILY TREATMENT   8:15 AM   (10 min )   AN  Se [address] [place] [address] Oncology    CONSULT PG  10:45 AM   (60 min )   Hafsa Vazquez MD   [facility] 25     80     01                12     58     96     31                                      Treatment Details         5/1/2023 - Cycle 3, Day 1      Supportive Care: potassium chloride      Take-Home Chemo: ONCBCN PROVIDER COMMUNICATION8, FLUOROURACIL AMBULATORY INFUSION    5/8/2023 - Cycle 4, Day 1      Take-Home Chemo: ONCBCN PROVIDER COMMUNICATION8, FLUOROURACIL AMBULATORY INFUSION    5/15/2023 - Cycle 5, Day 1      Take-Home Chemo: ONCBCN PROVIDER COMMUNICATION8, FLUOROURACIL AMBULATORY INFUSION        \

## 2023-05-07 DIAGNOSIS — D86.9 SARCOIDOSIS: ICD-10-CM

## 2023-05-08 ENCOUNTER — RADIATION THERAPY TREATMENT (OUTPATIENT)
Dept: RADIATION ONCOLOGY | Facility: HOSPITAL | Age: 58
End: 2023-05-08
Attending: STUDENT IN AN ORGANIZED HEALTH CARE EDUCATION/TRAINING PROGRAM

## 2023-05-08 ENCOUNTER — HOSPITAL ENCOUNTER (OUTPATIENT)
Dept: INFUSION CENTER | Facility: CLINIC | Age: 58
Discharge: HOME | End: 2023-05-08

## 2023-05-08 VITALS
HEART RATE: 110 BPM | WEIGHT: 145 LBS | HEIGHT: 61 IN | SYSTOLIC BLOOD PRESSURE: 138 MMHG | TEMPERATURE: 97.6 F | BODY MASS INDEX: 27.38 KG/M2 | DIASTOLIC BLOOD PRESSURE: 79 MMHG | RESPIRATION RATE: 18 BRPM | OXYGEN SATURATION: 98 %

## 2023-05-08 DIAGNOSIS — C18.7 MALIGNANT NEOPLASM OF SIGMOID COLON (HCC): Primary | ICD-10-CM

## 2023-05-08 DIAGNOSIS — C18.7 MALIGNANT NEOPLASM OF SIGMOID COLON (HCC): ICD-10-CM

## 2023-05-08 RX ORDER — PREDNISONE 10 MG/1
TABLET ORAL
Qty: 60 TABLET | Refills: 1 | Status: SHIPPED | OUTPATIENT
Start: 2023-05-08

## 2023-05-08 NOTE — PROGRESS NOTES
Patient to infusion for 5FU CADD connections  She offers no complaints  K+ today 3 0  TEAM message Adele Sears, she did not want to order IV K+ today, she wants pt to follow up with PCP to see if they want to adjust oral dose  Pt and spouse aware  5FU CADD connected, checked with 2nd RN  All connections secured, extra batteries given, green light flashing at time of discharge  Pt and spouse aware to time to return for disconnect  AVS declined

## 2023-05-09 ENCOUNTER — APPOINTMENT (OUTPATIENT)
Dept: RADIATION ONCOLOGY | Facility: HOSPITAL | Age: 58
End: 2023-05-09
Attending: STUDENT IN AN ORGANIZED HEALTH CARE EDUCATION/TRAINING PROGRAM

## 2023-05-09 ENCOUNTER — HOSPITAL ENCOUNTER (OUTPATIENT)
Dept: CT IMAGING | Facility: HOSPITAL | Age: 58
Discharge: HOME | End: 2023-05-09
Attending: RADIOLOGY

## 2023-05-09 VITALS
TEMPERATURE: 97.1 F | RESPIRATION RATE: 19 BRPM | DIASTOLIC BLOOD PRESSURE: 77 MMHG | OXYGEN SATURATION: 96 % | HEART RATE: 89 BPM | SYSTOLIC BLOOD PRESSURE: 145 MMHG

## 2023-05-09 DIAGNOSIS — D86.9 SARCOIDOSIS: ICD-10-CM

## 2023-05-09 DIAGNOSIS — C18.7 MALIGNANT NEOPLASM OF SIGMOID COLON (HCC): ICD-10-CM

## 2023-05-09 LAB
ANION GAP SERPL CALCULATED.3IONS-SCNC: 6 MMOL/L (ref 4–13)
BUN SERPL-MCNC: 16 MG/DL (ref 5–25)
CALCIUM SERPL-MCNC: 9.4 MG/DL (ref 8.4–10.2)
CHLORIDE SERPL-SCNC: 105 MMOL/L (ref 96–108)
CO2 SERPL-SCNC: 26 MMOL/L (ref 21–32)
CREAT SERPL-MCNC: 0.74 MG/DL (ref 0.6–1.3)
ERYTHROCYTE [DISTWIDTH] IN BLOOD BY AUTOMATED COUNT: 18.9 % (ref 11.6–15.1)
GFR SERPL CREATININE-BSD FRML MDRD: 91 ML/MIN/1.73SQ M
GLUCOSE P FAST SERPL-MCNC: 95 MG/DL (ref 65–99)
GLUCOSE SERPL-MCNC: 95 MG/DL (ref 65–140)
HCT VFR BLD AUTO: 37.9 % (ref 34.8–46.1)
HGB BLD-MCNC: 12 G/DL (ref 11.5–15.4)
INR PPP: 0.89 (ref 0.84–1.19)
MCH RBC QN AUTO: 28.8 PG (ref 26.8–34.3)
MCHC RBC AUTO-ENTMCNC: 31.7 G/DL (ref 31.4–37.4)
MCV RBC AUTO: 91 FL (ref 82–98)
PLATELET # BLD AUTO: 178 THOUSANDS/UL (ref 149–390)
PMV BLD AUTO: 9.2 FL (ref 8.9–12.7)
POTASSIUM SERPL-SCNC: 5 MMOL/L (ref 3.5–5.3)
PROTHROMBIN TIME: 12.3 SECONDS (ref 11.6–14.5)
RBC # BLD AUTO: 4.17 MILLION/UL (ref 3.81–5.12)
SODIUM SERPL-SCNC: 137 MMOL/L (ref 135–147)
WBC # BLD AUTO: 4.77 THOUSAND/UL (ref 4.31–10.16)

## 2023-05-09 RX ORDER — FENTANYL CITRATE 50 UG/ML
INJECTION, SOLUTION INTRAMUSCULAR; INTRAVENOUS AS NEEDED
Status: COMPLETED | OUTPATIENT
Start: 2023-05-09 | End: 2023-05-09

## 2023-05-09 RX ORDER — ACETAMINOPHEN 325 MG/1
650 TABLET ORAL ONCE
Status: COMPLETED | OUTPATIENT
Start: 2023-05-09 | End: 2023-05-09

## 2023-05-09 RX ORDER — MIDAZOLAM HYDROCHLORIDE 2 MG/2ML
INJECTION, SOLUTION INTRAMUSCULAR; INTRAVENOUS AS NEEDED
Status: COMPLETED | OUTPATIENT
Start: 2023-05-09 | End: 2023-05-09

## 2023-05-09 RX ADMIN — MIDAZOLAM HYDROCHLORIDE 1 MG: 1 INJECTION, SOLUTION INTRAMUSCULAR; INTRAVENOUS at 10:03

## 2023-05-09 RX ADMIN — IOHEXOL 100 ML: 350 INJECTION, SOLUTION INTRAVENOUS at 11:13

## 2023-05-09 RX ADMIN — FENTANYL CITRATE 25 MCG: 50 INJECTION, SOLUTION INTRAMUSCULAR; INTRAVENOUS at 09:37

## 2023-05-09 RX ADMIN — MIDAZOLAM HYDROCHLORIDE 1 MG: 1 INJECTION, SOLUTION INTRAMUSCULAR; INTRAVENOUS at 09:36

## 2023-05-09 RX ADMIN — ACETAMINOPHEN 325 MG: 325 TABLET ORAL at 15:24

## 2023-05-09 RX ADMIN — FENTANYL CITRATE 25 MCG: 50 INJECTION, SOLUTION INTRAMUSCULAR; INTRAVENOUS at 09:55

## 2023-05-09 RX ADMIN — FENTANYL CITRATE 25 MCG: 50 INJECTION, SOLUTION INTRAMUSCULAR; INTRAVENOUS at 10:19

## 2023-05-09 RX ADMIN — FENTANYL CITRATE 25 MCG: 50 INJECTION, SOLUTION INTRAMUSCULAR; INTRAVENOUS at 10:03

## 2023-05-09 RX ADMIN — MIDAZOLAM HYDROCHLORIDE 1 MG: 1 INJECTION, SOLUTION INTRAMUSCULAR; INTRAVENOUS at 10:18

## 2023-05-09 RX ADMIN — MIDAZOLAM HYDROCHLORIDE 1 MG: 1 INJECTION, SOLUTION INTRAMUSCULAR; INTRAVENOUS at 09:55

## 2023-05-09 NOTE — INTERVAL H&P NOTE
Update: (This section must be completed if the H&P was completed greater than 24 hrs to procedure or admission)    H&P reviewed  After examining the patient, I find no changed to the H&P since it had been written  Patient re-evaluated   Accept as history and physical     Enrrique Merino MD/May 9, 2023/11:07 AM

## 2023-05-09 NOTE — INTERVAL H&P NOTE
Update: (This section must be completed if the H&P was completed greater than 24 hrs to procedure or admission)    H&P reviewed  After examining the patient, I find no changed to the H&P since it had been written  Patient re-evaluated   Accept as history and physical

## 2023-05-09 NOTE — DISCHARGE INSTRUCTIONS
Percutaneous Liver Biopsy   WHAT YOU NEED TO KNOW:   A PLB is a procedure to remove a sample of tissue from your liver  The sample can be sent to a lab and tested for liver disease, cancer, or infection  After the procedure you may have pain and bruising at the biopsy site  You may also have pain in your right shoulder  These symptoms should get better in 48 to 72 hours  DISCHARGE INSTRUCTIONS:     [address] and Lee Reagan patients,  Contact Interventional Radiology at [phone] PATIENTS: Contact Interventional Radiology at [phone]   [facility] PATIENTS: Contact Interventional Radiology at [phone] if:    Fever greater than 101 or chills  You have severe pain in your abdomen  Your abdomen is larger than usual and feels hard  Your neck is more swollen and you have trouble swallowing  You feel weak or dizzy  Your heart is beating faster than usual    Your pain does not get better after you take pain medicine  Your wound is red, swollen, or draining pus  You have nausea or are vomiting  Your skin is itchy, swollen, or you have a rash  You have questions or concerns about your condition or care  Medicines:   Acetaminophen decreases pain and fever  It is available without a doctor's order  Acetaminophen can cause liver damage if not taken correctly  Take your home medicine as directed  Resume your normal diet  Small sips of flat soda will help with mild nausea  Self-care:   Rest as directed  Do not play sports, exercise, or lift anything heavier than 5 pounds for up to 1 week  Apply firm, steady pressure if bleeding occurs  A small amount of bleeding from your wound is possible  Apply pressure with a clean gauze or towel for 5 to 10 minutes  Call 911 if bleeding becomes heavy or does not stop  Ask your healthcare provider when to take your blood thinner or antiplatelet medicine  You may need to wait 24 to 72 hours to take your medicine   This will prevent bleeding  Follow up with your healthcare provider as directed: Write down your questions so you remember to ask them during your visits  Procedural Sedation   WHAT YOU NEED TO KNOW:   Procedural sedation is medicine used during procedures to help you feel relaxed and calm  You will remember little to none of the procedure  After sedation you may feel tired, weak, or unsteady on your feet  You may also have trouble concentrating or short-term memory loss  These symptoms should go away in 24 hours or less  DISCHARGE INSTRUCTIONS:     Call 911 or have someone else call for any of the following: You have sudden trouble breathing  You cannot be woken  Contact Interventional Radiology at [phone]   E[place] PATIENTS: Contact Interventional Radiology at [phone]) Polosatinder Deras PATIENTS: Contact Interventional Radiology at [phone]) if any of the following occur: You have a severe headache or dizziness  Your heart is beating faster than usual     You have a fever or chills  Your skin is itchy, swollen, or you have a rash  You have nausea or are vomiting for more than 8 hours after the procedure  You have questions or concerns about your condition or care  Self-care:   Have someone stay with you for 24 hours  This person can drive you to errands and help you do things around the house  This person can also watch for problems  Rest and do quiet activities for 24 hours  Do not exercise, ride a bike, or play sports  Stand up slowly to prevent dizziness and falls  Take short walks around the house with another person  Slowly return to your usual activities the next day  Do not drive or use dangerous machines or tools for 24 hours  You may injure yourself or others  Examples include a lawnmower, saw, or drill  Do not return to work for 24 hours if you use dangerous machines or tools for work  Do not make important decisions for 24 hours   For example, do not sign important papers or invest money  Drink liquids as directed  Liquids help flush the sedation medicine out of your body  Ask how much liquid to drink each day and which liquids are best for you  Eat small, frequent meals to prevent nausea and vomiting  Start with clear liquids such as juice or broth  If you do not vomit after clear liquids, you can eat your usual foods  Do not drink alcohol or take medicines that make you drowsy  This includes medicines that help you sleep and anxiety medicines  Ask your healthcare provider if it is safe for you to take pain medicine  Follow up with your healthcare provider as directed: Write down your questions so you remember to ask them during your visits

## 2023-05-10 ENCOUNTER — APPOINTMENT (OUTPATIENT)
Dept: RADIATION ONCOLOGY | Facility: HOSPITAL | Age: 58
End: 2023-05-10

## 2023-05-10 ENCOUNTER — APPOINTMENT (OUTPATIENT)
Dept: RADIATION ONCOLOGY | Facility: HOSPITAL | Age: 58
End: 2023-05-10
Attending: STUDENT IN AN ORGANIZED HEALTH CARE EDUCATION/TRAINING PROGRAM

## 2023-05-11 ENCOUNTER — APPOINTMENT (OUTPATIENT)
Dept: RADIATION ONCOLOGY | Facility: HOSPITAL | Age: 58
End: 2023-05-11
Attending: STUDENT IN AN ORGANIZED HEALTH CARE EDUCATION/TRAINING PROGRAM

## 2023-05-12 ENCOUNTER — APPOINTMENT (OUTPATIENT)
Dept: RADIATION ONCOLOGY | Facility: HOSPITAL | Age: 58
End: 2023-05-12
Attending: STUDENT IN AN ORGANIZED HEALTH CARE EDUCATION/TRAINING PROGRAM

## 2023-05-13 ENCOUNTER — HOSPITAL ENCOUNTER (OUTPATIENT)
Dept: INFUSION CENTER | Facility: CLINIC | Age: 58
Discharge: HOME | End: 2023-05-13

## 2023-05-13 DIAGNOSIS — C18.7 MALIGNANT NEOPLASM OF SIGMOID COLON (HCC): Primary | ICD-10-CM

## 2023-05-13 LAB
ALBUMIN SERPL BCP-MCNC: 3.6 G/DL (ref 3.5–5)
ALP SERPL-CCNC: 260 U/L (ref 34–104)
ALT SERPL W P-5'-P-CCNC: 37 U/L (ref 7–52)
ANION GAP SERPL CALCULATED.3IONS-SCNC: 7 MMOL/L (ref 4–13)
AST SERPL W P-5'-P-CCNC: 33 U/L (ref 13–39)
BASOPHILS # BLD AUTO: 0.01 THOUSANDS/ÂΜL (ref 0–0.1)
BASOPHILS NFR BLD AUTO: 0 % (ref 0–1)
BILIRUB SERPL-MCNC: 0.91 MG/DL (ref 0.2–1)
BUN SERPL-MCNC: 23 MG/DL (ref 5–25)
CALCIUM SERPL-MCNC: 9.2 MG/DL (ref 8.4–10.2)
CHLORIDE SERPL-SCNC: 105 MMOL/L (ref 96–108)
CO2 SERPL-SCNC: 28 MMOL/L (ref 21–32)
CREAT SERPL-MCNC: 0.72 MG/DL (ref 0.6–1.3)
EOSINOPHIL # BLD AUTO: 0.12 THOUSAND/ÂΜL (ref 0–0.61)
EOSINOPHIL NFR BLD AUTO: 3 % (ref 0–6)
ERYTHROCYTE [DISTWIDTH] IN BLOOD BY AUTOMATED COUNT: 19.4 % (ref 11.6–15.1)
GFR SERPL CREATININE-BSD FRML MDRD: 94 ML/MIN/1.73SQ M
GLUCOSE SERPL-MCNC: 90 MG/DL (ref 65–140)
HCT VFR BLD AUTO: 34.8 % (ref 34.8–46.1)
HGB BLD-MCNC: 10.9 G/DL (ref 11.5–15.4)
IMM GRANULOCYTES # BLD AUTO: 0.04 THOUSAND/UL (ref 0–0.2)
IMM GRANULOCYTES NFR BLD AUTO: 1 % (ref 0–2)
LYMPHOCYTES # BLD AUTO: 0.32 THOUSANDS/ÂΜL (ref 0.6–4.47)
LYMPHOCYTES NFR BLD AUTO: 7 % (ref 14–44)
MCH RBC QN AUTO: 28.5 PG (ref 26.8–34.3)
MCHC RBC AUTO-ENTMCNC: 31.3 G/DL (ref 31.4–37.4)
MCV RBC AUTO: 91 FL (ref 82–98)
MONOCYTES # BLD AUTO: 0.39 THOUSAND/ÂΜL (ref 0.17–1.22)
MONOCYTES NFR BLD AUTO: 8 % (ref 4–12)
NEUTROPHILS # BLD AUTO: 3.92 THOUSANDS/ÂΜL (ref 1.85–7.62)
NEUTS SEG NFR BLD AUTO: 81 % (ref 43–75)
NRBC BLD AUTO-RTO: 0 /100 WBCS
PLATELET # BLD AUTO: 164 THOUSANDS/UL (ref 149–390)
PMV BLD AUTO: 9 FL (ref 8.9–12.7)
POTASSIUM SERPL-SCNC: 3.1 MMOL/L (ref 3.5–5.3)
PROT SERPL-MCNC: 6.2 G/DL (ref 6.4–8.4)
RBC # BLD AUTO: 3.82 MILLION/UL (ref 3.81–5.12)
SODIUM SERPL-SCNC: 140 MMOL/L (ref 135–147)
WBC # BLD AUTO: 4.8 THOUSAND/UL (ref 4.31–10.16)

## 2023-05-13 NOTE — PATIENT INSTRUCTIONS
May 2023      [place] Monday Tuesday Wednesday Thursday Friday Saturday        1    RADIATION DAILY TREATMENT   8:15 AM   (15 min )   AN  [address] [facility]    INF ONCOLOGY TX-TREATMENT PLAN   8:45 AM   (180 min )   AN INF CHEMO [address] 2    RADIATION DAILY TREATMENT   8:15 AM   (15 min )   AN  [address] On[facility] 3    RADIATION ON TREATMENT VISIT   8:15 AM   (15 min )   Yifan Prieto MD   [address] Radiation Oncology    RADIATION DAILY TREATMENT   8:15 AM   (15 min )   AN  [address] Oncology    FOLLOW UP PG   8:45 AM   (20 min )   Mayte Rodriguez MD   [facility] [place]    LAB WALK IN  10:35 AM   (5 min )   AN MOB PHLEB CHAIR 3   [address] [facility] 4    RADIATION DAILY TREATMENT   8:15 AM   (15 min )   AN  [address] Oncology    HEPATOLOGY FOLLOW UP PG  10:45 AM   (30 min )   Bear Villasenor MD   [facility] 5    RADIATION DAILY TREATMENT   8:15 AM   (15 min )   AN RAD [address] Radiation Oncology 6    INF ONCOLOGY TX-TREATMENT PLAN   8:45 AM   (30 min )   AN INF CHEMO [address]    Cycle 3, Day 1        7     8    INF ONCOLOGY TX-TREATMENT PLAN   8:00 AM   (90 min )   AN INF [place]    RADIATION DAILY TREATMENT   8:15 AM   (15 min )   AN RAD ONC RES   [address] Oncology 9    RADIATION ON TREATMENT VISIT   8:15 AM   (15 min )   Yifan Prieto MD   [address] [facility] Oncology    RADIATION DAILY TREATMENT   8:15 AM   (15 min )   AN  [address] On[facility]    IR BIOPSY LIVER MASS SED   9:00 AM   (75 min )   AN CT 2   [facility] CAT Scan 10    RADIATION DAILY TREATMENT   8:15 AM   (15 min )   AN RAD ONC RES   [address] Oncology    RADIATION ON TREATMENT VISIT   8:30 AM   (10 min )   AN RAD ONC RES   [address] Oncology 11    RADIATION DAILY TREATMENT   8:15 AM   (15 min )   AN RAD ONC RES   [address] Oncology 12    RADIATION DAILY TREATMENT   1:15 PM   (15 min )   AN  [address] Oncology 13    INF ONCOLOGY TX-TREATMENT PLAN   8:00 AM   (30 min )   AN INF CHEMO [address]    Cycle 4, Day 1        14     15    INF ONCOLOGY TX-TREATMENT PLAN   8:45 AM   (90 min )   AN INF CHEMO CHAIR   [address]    RADIATION DAILY TREATMENT   2:00 PM   (15 min )   AN  [address] Oncology 16    RADIATION DAILY TREATMENT   8:15 AM   (15 min )   AN RAD ONC RES   [address] Radiation Oncology 17    RADIATION DAILY TREATMENT   8:15 AM   (15 min )   AN RAD ONC RES   St  [facility] Radiation Oncology 18    RADIATION ON TREATMENT VISIT   8:15 AM   (15 min )   Lisa Rodriguez MD   [address] Radiation Oncology    RADIATION DAILY TREATMENT   8:15 AM   (15 min )   AN RAD ONC RES   [address] Radiation Oncology 19    RADIATION DAILY TREATMENT   8:15 AM   (15 min )   AN  [address] Oncology 20    INF ONCOLOGY TX-TREATMENT PLAN   9:00 AM   (30 min )   AN INF CHEMO CHAIR   [address]    Cycle 5, Day 1        21     22    RADIATION DAILY TREATMENT   8:15 AM   (15 min )   AN  [address] Oncology 23    RADIATION DAILY TREATMENT   8:15 AM   (15 min )   Oanh Agarwal MD   [facility] [address] Oncology 24    RADIATION DAILY TREATMENT   8:15 AM   (15 min )   AN  [address] [place] On[facility]    CONSULT PG  10:45 AM   (60 min )   [facility] 25    RADIATION DAILY TREATMENT   8:15 AM   (10 min )   AN RAD [id number] Radiation Oncology 26     27                28     29     30     31                                      Treatment Details         5/1/2023 - Cycle 3, Day 1      Supportive Care: potassium chloride      Take-Home Chemo: ONCBCN PROVIDER COMMUNICATION8, FLUOROURACIL AMBULATORY INFUSION    5/8/2023 - Cycle 4, Day 1      Take-Home Chemo: ONCBCN PROVIDER COMMUNICATION8, FLUOROURACIL AMBULATORY INFUSION    5/15/2023 - Cycle 5, Day 1      Take-Home Chemo: ONCBCN PROVIDER COMMUNICATION8, FLUOROURACIL AMBULATORY INFUSION

## 2023-05-13 NOTE — PROGRESS NOTES
Patient here for CADD d/c with labs  She is doing ok, denies diarrhea though has perineal pain  Using at home remedies including oxycodone as prescribed and heating pad  No other changes to report  Patient tolerated CADD d/c and lab draw without incident and will RTO Monday 5/15/23 for her next cycle

## 2023-05-14 PROBLEM — Z00.00 HEALTHCARE MAINTENANCE: Status: RESOLVED | Noted: 2023-03-15 | Resolved: 2023-05-14

## 2023-05-15 ENCOUNTER — APPOINTMENT (OUTPATIENT)
Dept: RADIATION ONCOLOGY | Facility: HOSPITAL | Age: 58
End: 2023-05-15
Attending: STUDENT IN AN ORGANIZED HEALTH CARE EDUCATION/TRAINING PROGRAM

## 2023-05-15 ENCOUNTER — HOSPITAL ENCOUNTER (OUTPATIENT)
Dept: INFUSION CENTER | Facility: CLINIC | Age: 58
Discharge: HOME | End: 2023-05-15

## 2023-05-15 VITALS
BODY MASS INDEX: 25.96 KG/M2 | TEMPERATURE: 97.5 F | OXYGEN SATURATION: 99 % | SYSTOLIC BLOOD PRESSURE: 129 MMHG | DIASTOLIC BLOOD PRESSURE: 75 MMHG | HEIGHT: 63 IN | HEART RATE: 72 BPM | RESPIRATION RATE: 16 BRPM | WEIGHT: 146.5 LBS

## 2023-05-15 DIAGNOSIS — C18.7 MALIGNANT NEOPLASM OF SIGMOID COLON (HCC): Primary | ICD-10-CM

## 2023-05-15 NOTE — PROGRESS NOTES
Patient is here for 5 FU Cadd connect  Labs from 5/13/23 reviewed and meet parameters  k is 3 1  patient states she is taking 10 meq of potassium 3 times a day  She also denies any vomiting or diarrhea  19 Agnes Lennon notified of K of 3 1 and  of above  No new orders given  5 FU CADD connected per protocol with 2 Rn double checks, confirmed pump running with green light flashing  Patient aware of when to come back for pump d/c   Next appointment confirmed and she declined avs

## 2023-05-16 ENCOUNTER — APPOINTMENT (OUTPATIENT)
Dept: RADIATION ONCOLOGY | Facility: HOSPITAL | Age: 58
End: 2023-05-16
Attending: STUDENT IN AN ORGANIZED HEALTH CARE EDUCATION/TRAINING PROGRAM

## 2023-05-17 ENCOUNTER — TELEPHONE (OUTPATIENT)
Dept: HEMATOLOGY ONCOLOGY | Facility: CLINIC | Age: 58
End: 2023-05-17

## 2023-05-17 ENCOUNTER — PATIENT OUTREACH (OUTPATIENT)
Dept: CASE MANAGEMENT | Facility: HOSPITAL | Age: 58
End: 2023-05-17

## 2023-05-17 ENCOUNTER — APPOINTMENT (OUTPATIENT)
Dept: RADIATION ONCOLOGY | Facility: HOSPITAL | Age: 58
End: 2023-05-17
Attending: STUDENT IN AN ORGANIZED HEALTH CARE EDUCATION/TRAINING PROGRAM

## 2023-05-17 DIAGNOSIS — C18.7 MALIGNANT NEOPLASM OF SIGMOID COLON (HCC): Primary | ICD-10-CM

## 2023-05-17 NOTE — PROGRESS NOTES
MSW phoned and spoke to ex- Jailyn Swathi who states patient is having increasing pain in her bottom area  Jailyn Echevarria states patient is prescribed oxy and that it takes a little time to take effect  Jailyn Echevarria states he transports patient to her appointments  Jailyn Echevarria states he will transport patient to her radiation Dr Joselin Beltre appointment tomorrow  Jailyn Echevarria states he will ask the doctor about Patient's pain and that she currently has stints and he has concerns the stints may be causing more pain  Jailyn Echevarria states patient lives downstairs and he lives upstairs  Jailyn Echevarria states he does most of the caregiving and would like to know if he can get paid for it  MSW sent the [place] area on aging for [place] address and phone number to patient's e-mail [email or web address]  MSW provided Jailyn Echevarria this MSW's phone number for future resource needs  MSW will continue to follow

## 2023-05-18 ENCOUNTER — PATIENT OUTREACH (OUTPATIENT)
Dept: HEMATOLOGY ONCOLOGY | Facility: CLINIC | Age: 58
End: 2023-05-18

## 2023-05-18 ENCOUNTER — DOCUMENTATION (OUTPATIENT)
Dept: HEMATOLOGY ONCOLOGY | Facility: CLINIC | Age: 58
End: 2023-05-18

## 2023-05-18 ENCOUNTER — APPOINTMENT (OUTPATIENT)
Dept: RADIATION ONCOLOGY | Facility: HOSPITAL | Age: 58
End: 2023-05-18
Attending: STUDENT IN AN ORGANIZED HEALTH CARE EDUCATION/TRAINING PROGRAM

## 2023-05-18 NOTE — PROGRESS NOTES
Scheduled CT, MRI, and Dr Tavon Bucio follow up appointments  Spoke with Florence Lara, pts point of contact and confirmed date , time and location for  both imaging and Dr Tavon Bucio appointments  Also went over the instructions for the CT    He understood and was thankful for the call

## 2023-05-18 NOTE — PROGRESS NOTES
Pt and ex  called today to let me know that the pt was rqsting for one time use bags for her ostomy vs the multi use bags she has at the moment, I called [employer] program number to make sure the pt was set up w a vendor and that she was rcving supplies from them, Alpesh Orta (ex ) stated that they do have supplies at home but the appliances are multi-use vs one time use, I explained if they wanted to get more supplies they could visit the colorectal office to see what supplies they had avail to them additional to the supplies they have at home, otherwise they really should call their vendor that they are set up w to order some more  I asked what the ostomy supplies had to do w the pain they were stating the pt has been c/o  Alpesh Orta explained that it is more painful for the pt to mess w the appliance when cleaning it which is why they want to just get rid of it and place a new one  Pt seen rad onc and MD aware of the pt's c/o pain, she has this pain constantly when she goes to the BR to urinate she reports  Pt has a pall care appt set up on 5/24 w Dr Amy Turner as well  Pt has Oxy prescribed for the pain, it helps her a bit but they said it takes a while for it to work, about an hr, I explained that she should take it as she starts feeling the pain worsening instead of waiting til its really painful, he said that she sometimes wakes up from this severe pain but they will make sure to have her take the pills ahead of time when she can

## 2023-05-19 ENCOUNTER — APPOINTMENT (OUTPATIENT)
Dept: RADIATION ONCOLOGY | Facility: HOSPITAL | Age: 58
End: 2023-05-19
Attending: STUDENT IN AN ORGANIZED HEALTH CARE EDUCATION/TRAINING PROGRAM

## 2023-05-19 DIAGNOSIS — Z96.0 RETAINED URETERAL STENT: Primary | ICD-10-CM

## 2023-05-19 NOTE — PROGRESS NOTES
Yes  I am not sure what the cause of her pain is  It preceded her recent treatment and may be urologic vs autoinflammatory in effect  I asked her to please follow-up with her urologist at her next appointment

## 2023-05-20 ENCOUNTER — HOSPITAL ENCOUNTER (OUTPATIENT)
Dept: INFUSION CENTER | Facility: CLINIC | Age: 58
Discharge: HOME | End: 2023-05-20

## 2023-05-20 DIAGNOSIS — C18.7 MALIGNANT NEOPLASM OF SIGMOID COLON (HCC): Primary | ICD-10-CM

## 2023-05-20 LAB
ALBUMIN SERPL BCP-MCNC: 3.5 G/DL (ref 3.5–5)
ALP SERPL-CCNC: 240 U/L (ref 34–104)
ALT SERPL W P-5'-P-CCNC: 34 U/L (ref 7–52)
ANION GAP SERPL CALCULATED.3IONS-SCNC: 7 MMOL/L (ref 4–13)
AST SERPL W P-5'-P-CCNC: 26 U/L (ref 13–39)
BASOPHILS # BLD AUTO: 0.02 THOUSANDS/ÂΜL (ref 0–0.1)
BASOPHILS NFR BLD AUTO: 0 % (ref 0–1)
BILIRUB SERPL-MCNC: 0.82 MG/DL (ref 0.2–1)
BUN SERPL-MCNC: 24 MG/DL (ref 5–25)
CALCIUM SERPL-MCNC: 9.3 MG/DL (ref 8.4–10.2)
CHLORIDE SERPL-SCNC: 106 MMOL/L (ref 96–108)
CO2 SERPL-SCNC: 26 MMOL/L (ref 21–32)
CREAT SERPL-MCNC: 0.85 MG/DL (ref 0.6–1.3)
EOSINOPHIL # BLD AUTO: 0.13 THOUSAND/ÂΜL (ref 0–0.61)
EOSINOPHIL NFR BLD AUTO: 2 % (ref 0–6)
ERYTHROCYTE [DISTWIDTH] IN BLOOD BY AUTOMATED COUNT: 20.7 % (ref 11.6–15.1)
GFR SERPL CREATININE-BSD FRML MDRD: 77 ML/MIN/1.73SQ M
GLUCOSE SERPL-MCNC: 117 MG/DL (ref 65–140)
HCT VFR BLD AUTO: 33.9 % (ref 34.8–46.1)
HGB BLD-MCNC: 10.6 G/DL (ref 11.5–15.4)
IMM GRANULOCYTES # BLD AUTO: 0.03 THOUSAND/UL (ref 0–0.2)
IMM GRANULOCYTES NFR BLD AUTO: 1 % (ref 0–2)
LYMPHOCYTES # BLD AUTO: 0.2 THOUSANDS/ÂΜL (ref 0.6–4.47)
LYMPHOCYTES NFR BLD AUTO: 4 % (ref 14–44)
MCH RBC QN AUTO: 28.9 PG (ref 26.8–34.3)
MCHC RBC AUTO-ENTMCNC: 31.3 G/DL (ref 31.4–37.4)
MCV RBC AUTO: 92 FL (ref 82–98)
MONOCYTES # BLD AUTO: 0.34 THOUSAND/ÂΜL (ref 0.17–1.22)
MONOCYTES NFR BLD AUTO: 6 % (ref 4–12)
NEUTROPHILS # BLD AUTO: 4.94 THOUSANDS/ÂΜL (ref 1.85–7.62)
NEUTS SEG NFR BLD AUTO: 87 % (ref 43–75)
NRBC BLD AUTO-RTO: 0 /100 WBCS
PLATELET # BLD AUTO: 154 THOUSANDS/UL (ref 149–390)
PMV BLD AUTO: 9 FL (ref 8.9–12.7)
POTASSIUM SERPL-SCNC: 3.3 MMOL/L (ref 3.5–5.3)
PROT SERPL-MCNC: 6.2 G/DL (ref 6.4–8.4)
RBC # BLD AUTO: 3.67 MILLION/UL (ref 3.81–5.12)
SODIUM SERPL-SCNC: 139 MMOL/L (ref 135–147)
WBC # BLD AUTO: 5.66 THOUSAND/UL (ref 4.31–10.16)

## 2023-05-20 NOTE — PROGRESS NOTES
Patient here for CADD d/c  CADD pump d/c'd, res volume 0 0 ml  Labs drawn for treatment on Monday  Patient aware of next appointment   Declined AVS

## 2023-05-22 ENCOUNTER — APPOINTMENT (OUTPATIENT)
Dept: RADIATION ONCOLOGY | Facility: HOSPITAL | Age: 58
End: 2023-05-22

## 2023-05-22 ENCOUNTER — HOSPITAL ENCOUNTER (OUTPATIENT)
Dept: INFUSION CENTER | Facility: CLINIC | Age: 58
Discharge: HOME | End: 2023-05-22

## 2023-05-22 ENCOUNTER — APPOINTMENT (OUTPATIENT)
Dept: RADIATION ONCOLOGY | Facility: HOSPITAL | Age: 58
End: 2023-05-22
Attending: STUDENT IN AN ORGANIZED HEALTH CARE EDUCATION/TRAINING PROGRAM

## 2023-05-22 VITALS
HEART RATE: 79 BPM | TEMPERATURE: 96.4 F | HEIGHT: 63 IN | RESPIRATION RATE: 18 BRPM | SYSTOLIC BLOOD PRESSURE: 148 MMHG | BODY MASS INDEX: 26.05 KG/M2 | DIASTOLIC BLOOD PRESSURE: 79 MMHG | OXYGEN SATURATION: 100 % | WEIGHT: 147 LBS

## 2023-05-22 DIAGNOSIS — C18.7 MALIGNANT NEOPLASM OF SIGMOID COLON (HCC): Primary | ICD-10-CM

## 2023-05-23 ENCOUNTER — PREP FOR PROCEDURE (OUTPATIENT)
Dept: UROLOGY | Facility: AMBULATORY SURGERY CENTER | Age: 58
End: 2023-05-23

## 2023-05-23 ENCOUNTER — APPOINTMENT (OUTPATIENT)
Dept: RADIATION ONCOLOGY | Facility: HOSPITAL | Age: 58
End: 2023-05-23

## 2023-05-23 DIAGNOSIS — N13.5 URETERAL STRICTURE: ICD-10-CM

## 2023-05-23 DIAGNOSIS — N20.1 CALCULUS OF URETER: Primary | ICD-10-CM

## 2023-05-23 DIAGNOSIS — Z96.0 RETAINED URETERAL STENT: Primary | ICD-10-CM

## 2023-05-23 DIAGNOSIS — R39.89 SUSPECTED UTI: ICD-10-CM

## 2023-05-24 ENCOUNTER — APPOINTMENT (OUTPATIENT)
Dept: RADIATION ONCOLOGY | Facility: HOSPITAL | Age: 58
End: 2023-05-24

## 2023-05-24 ENCOUNTER — HOSPITAL ENCOUNTER (OUTPATIENT)
Dept: RADIOLOGY | Age: 58
Discharge: HOME | End: 2023-05-24

## 2023-05-24 ENCOUNTER — TELEPHONE (OUTPATIENT)
Dept: GASTROENTEROLOGY | Facility: CLINIC | Age: 58
End: 2023-05-24

## 2023-05-24 ENCOUNTER — CONSULT (OUTPATIENT)
Dept: PALLIATIVE MEDICINE | Facility: CLINIC | Age: 58
End: 2023-05-24

## 2023-05-24 ENCOUNTER — APPOINTMENT (OUTPATIENT)
Dept: RADIATION ONCOLOGY | Facility: HOSPITAL | Age: 58
End: 2023-05-24
Attending: STUDENT IN AN ORGANIZED HEALTH CARE EDUCATION/TRAINING PROGRAM

## 2023-05-24 ENCOUNTER — SOCIAL WORK (OUTPATIENT)
Dept: PALLIATIVE MEDICINE | Facility: CLINIC | Age: 58
End: 2023-05-24

## 2023-05-24 VITALS
OXYGEN SATURATION: 99 % | TEMPERATURE: 96.8 F | BODY MASS INDEX: 26.96 KG/M2 | DIASTOLIC BLOOD PRESSURE: 58 MMHG | HEIGHT: 62 IN | SYSTOLIC BLOOD PRESSURE: 110 MMHG | HEART RATE: 88 BPM | WEIGHT: 146.5 LBS

## 2023-05-24 DIAGNOSIS — C20 RECTAL CANCER (HCC): ICD-10-CM

## 2023-05-24 DIAGNOSIS — Z71.89 ADVANCED CARE PLANNING/COUNSELING DISCUSSION: ICD-10-CM

## 2023-05-24 DIAGNOSIS — Z71.89 GOALS OF CARE, COUNSELING/DISCUSSION: ICD-10-CM

## 2023-05-24 DIAGNOSIS — M62.838 MUSCLE SPASM: ICD-10-CM

## 2023-05-24 DIAGNOSIS — D86.9 SARCOIDOSIS: ICD-10-CM

## 2023-05-24 DIAGNOSIS — C18.7 MALIGNANT NEOPLASM OF SIGMOID COLON (HCC): Primary | ICD-10-CM

## 2023-05-24 DIAGNOSIS — Z51.5 PALLIATIVE CARE PATIENT: ICD-10-CM

## 2023-05-24 DIAGNOSIS — Z71.89 COUNSELING AND COORDINATION OF CARE: Primary | ICD-10-CM

## 2023-05-24 DIAGNOSIS — Z96.0 RETAINED URETERAL STENT: ICD-10-CM

## 2023-05-24 DIAGNOSIS — K59.03 DRUG-INDUCED CONSTIPATION: ICD-10-CM

## 2023-05-24 DIAGNOSIS — G89.3 CANCER RELATED PAIN: ICD-10-CM

## 2023-05-24 RX ORDER — OXYCODONE HYDROCHLORIDE 5 MG/1
5-10 TABLET ORAL EVERY 4 HOURS PRN
Qty: 180 TABLET | Refills: 0 | Status: SHIPPED | OUTPATIENT
Start: 2023-05-24

## 2023-05-24 RX ORDER — ACETAMINOPHEN 500 MG
1000 TABLET ORAL 3 TIMES DAILY
Qty: 180 TABLET | Refills: 2 | Status: SHIPPED | OUTPATIENT
Start: 2023-05-24

## 2023-05-24 RX ORDER — CYCLOBENZAPRINE HCL 5 MG
5 TABLET ORAL 3 TIMES DAILY PRN
Qty: 90 TABLET | Refills: 2 | Status: SHIPPED | OUTPATIENT
Start: 2023-05-24

## 2023-05-24 NOTE — PATIENT INSTRUCTIONS
"It was good to see you today  Thank you for coming in  Increase oxycodone to 5mg to 10mg (one to two tablets) for moderate to severe pain, every 4 hours as needed  Take it as pain rises to the 5-6/10 level so it can be more effective; waiting until pain is 10/10 may make it harder for the medicine to work  We can discuss long-acting pain relief medications in the future, once we know how much pain medicine you need in a day  Tylenol, 1000mg three times per day every day, can be a safe and effective way to reduce chronic pain  Use Flexeril as needed for muscle spasms  When we use opioids for pain control, constipation is common and patients should act to prevent it:  Drink PLENTY of water  This is important to keep the gut moving  Some people have success w/ using prunes, prune juice, certain fruits or vegetables (apples, bananas, prunes, pears, raspberries, and vegetables like string beans, broccoli, spinach, kale, squash, lentils, peas, and beans), or fiber gummies  Try a probiotic  This could be yogurt or kefir, or fermented beverages such as kombucha, but probiotics are also available in capsule form  Aim for 10-15 billion colony-forming-units, w/ bacteria such as Lactobacillus / Saccharomyces / Actinomyces  Osmotic laxatives (Miralax, magnesium citrate, Milk of Magnesia) can be very useful for opioid-induced constipation (OIC); take daily to prevent OIC  Bulk laxatives (Citrucel, Metamucil, Fibercon, Benefiber, wheat germ) are useful for constipation in patients who are not taking opioids, but are not recommended if you are taking opioids  Colace is good for softening hard stools, or preventing constipation when opioids are being used - but does not stimulate the bowel to move things along once constipation has occurred  You can use senna, 1 to 2 tabs, once or twice daily as needed for constipation  Use as directed on the box/bottle  Senna is also available in a tea (\"Smooth Move\")   Should that not " "be enough for your constipation, you can try Dulcolax  Should that not be enough, consider an enema  All of these medications are available over-the-counter  Consider mediation and acupuncture for symptom control  One provider is \"[facility]\"  [address] [facility], [address] [facility] [address], [address] [place] [address]     [phone]  They take some insurers  They offer: CBD oils, Acupuncture, Herbal therapy, Nutritional counseling  Website: [email or web address]  A copy of the [facility] Advanced Directive was provided; please review and discuss w/ your family  We can discuss it at our next visit  When complete, you may bring it in to any [facility]'s appointment where staff can witness your signature and scan it in to the system  Return in about 1 month (around 6/24/2023)  Call us for refills on medications that we supply, as needed  If something changes and you need to come in sooner, please call our office  PRESCRIPTION REFILL REMINDER:  All medication refills should be requested prior to [facility] on Friday  Any refill requests after noon on Friday would be addressed the following Monday  MEDICATION SAFETY ISSUES:  Do not drive under the influence of narcotics (including opioids), watch for adverse effects including confusion / altered mental status / respiratory depression (slowed breathing), keep medications stored in a safe/locked environment, do not use alcohol while opioids or other narcotics are in your system     "

## 2023-05-24 NOTE — PROGRESS NOTES
Consult to Palliative and Supportive Care  Bertha Schilling 56 y o  female [phone]    ASSESSMENT & PLAN:  1  Malignant neoplasm of sigmoid colon ([facility] [address] )    2  Rectal cancer ([facility] [address] )    3  Sarcoidosis    4  Cancer related pain    5  Drug-induced constipation    6  Muscle spasm    7  Palliative care patient    8  Advanced care planning/counseling discussion    9  Goals of care, counseling/discussion          • Time spent introducing the concept of palliative care in general, and the [facility] offering in specific  Assessed patient's understanding of her palliative diagnosis and current plan of treatment  • Continue disease-directed cares  Patient wishes to pursue any offered treatment which might prolong her life or reduce symptom burden  ChemoRT to conclude this week and plan is hopefully for surgery in July  • ACP: Patient has not completed any advanced healthcare directives  Advanced directive counseling given  She accepts a copy of the [facility] Advanced Directive along with counseling  Reviewed with patient  ACP may be further reviewed next visit  • For cancer-related pain (and also some possible more acute pain d/t ureteral stent issues), which can be severe and debilitating   o Start Flexeril for muscle spasm component  PRN use for now   o Increase oxyIR to 5-10mg q4h PRN moderate-severe pain  Once we know her analgesic needs we can consider long-acting opioid therapy  o Take Tylenol 1000mg TID ATC  o Counseled on integrative medicine options, provided information on the [email or web address]  • Counseled on bowel regimen for constipation  • Continue other medications  • Reviewed notes (Radiation Oncology, Gastroenterology, Medical Oncology), labs (5/9/23 biopsy pathology showing no malignancy; 5/20/23 Cr 0 85, alb 3 5, tProt 6 2, , K 3 3, Hb 10 6), imaging + procedures (5/9/23 IR biopsy liver, 3/24/23 MRI abdomen)  • Return in about 1 month (around 6/24/2023)  • Emotional support provided    • Medication safety issues addressed - no driving under the influence of narcotics (including opioids), watch for adverse effects including AMS or respiratory depression (slowed breathing), keep medications stored in a safe/locked environment, do not use alcohol while opioids or other narcotics are in one's system  Requested Prescriptions     Signed Prescriptions Disp Refills   • acetaminophen (TYLENOL) 500 mg tablet 180 tablet 2     Sig: Take 2 tablets (1,000 mg total) by mouth 3 (three) times a day   • oxyCODONE (Roxicodone) 5 immediate release tablet 180 tablet 0     Sig: Take 1-2 tablets (5-10 mg total) by mouth every 4 (four) hours as needed for moderate pain or severe pain Max Daily Amount: 60 mg   • cyclobenzaprine (FLEXERIL) 5 mg tablet 90 tablet 2     Sig: Take 1 tablet (5 mg total) by mouth 3 (three) times a day as needed for muscle spasms       Medications Discontinued During This Encounter   Medication Reason   • diclofenac sodium (VOLTAREN) 50 mg EC tablet Therapy completed   • acetaminophen (TYLENOL) 325 mg tablet Dose adjustment   • acetaminophen (TYLENOL) 325 mg tablet Dose adjustment   • oxyCODONE (Roxicodone) 5 immediate release tablet Reorder       Representatives have queried the patient's controlled substance dispensing history in the Prescription Drug Monitoring Program in compliance with regulations before I have prescribed any controlled substances  The prescription history is consistent with prescribed therapy and our practice policies  45+ minutes were spent in this ambulatory visit with greater than 50% of the time spent face to face with patient and her ex- Tyler Sorensen in counseling or coordination of care including symptom assessment and management, medication review, medication adjustment, psychosocial support, chart review, imaging review, lab review, advanced directives, goals of care, opioid titration, supportive listening and anticipatory guidance   All of the patient's questions were "answered during this discussion  SUBJECTIVE:  Chief Complaint   Patient presents with   • Consult        HPI    Lexus Shaw is a 56 y o  female w/ rectosigmoid cancer s/p laparoscopic diverting colostomy in 12/2022 and receiving neoadjuvant chemoXRT w/ plan for surgery in coming months; chemo-induced diarrhea, biopsy-proven pulmonary sarcoidosis on steroids, nephrolithiasis w/ urinary obstruction  She follows w/ Dr Madhu Abdul (Radiation Oncology), Dr Samira Spangler (Gastroenterology), Dr Vanessa Reyes (Medical Oncology)  Patient is new to [facility]; referred by Radiation Oncology for symptom management  Patient endorses 10/10 pain today, focused in her flanks and her genital and rectal areas  Some pain is likely cancer-related as she still has a large tumor along the anal verge  Some pain may be s/t issues w/ ureteral stent; US is scheduled for today  She has been using oxyIR sparingly, noting that pain can drop from severe to moderate levels for a few hours when she takes 5mg; pain is mild (around 3/10) at baseline and she is never without pain  She still has some tabs remaining of the #30 tabs filled 5/3/23  She feels some of her pain is s/t muscle spasms  She can sometimes redirect herself (from focusing on pain) through prayer, or singing  Patient endorses some constipation  She has an ostomy  She denies N/V  She denies mood issues  Sleep is affected by pain  CADD pump is in place today  Patient states chemoRT will be complete this week and hopefully she will have surgery in July  Patient lives with her ex- Larry Bernal and their two daughters ([phone], 23yo)  Family is supportive  She also draws comfort from her broderick, and prayer  They have two cats, Kayla and Licorice (but everyone calls Licorice \"Baby\")  PDMP shows no concerns      The following portions of the medical history were reviewed: past medical history, surgical history, problem list, medication list, family history, and social " history      Past Medical History:   Diagnosis Date   • Cancer [facility])    • Colon cancer ([facility] [address] )    • Fall    • Headache    • Hemochromatosis, unspecified    • History of transfusion     2022 - no adverse reaction   • Kidney calculi    • Kidney stone    • Liver disease     hemangioma   • Muscle weakness    • Personal history of COVID-19 12/2022   • Sarcoidosis    • Seizures ([facility] [address] )     2022     Past Surgical History:   Procedure Laterality Date   • ABSCESS DRAINAGE      left breast   • CHEST TUBE INSERTION     • COLON SURGERY      colostomy   • COLONOSCOPY     • FL GUIDED CENTRAL VENOUS ACCESS DEVICE INSERTION  3/21/2023   • FL RETROGRADE PYELOGRAM  1/23/2023   • FL RETROGRADE PYELOGRAM  4/3/2023   • IR BIOPSY LIVER MASS  5/9/2023   • LUNG BIOPSY     • MD CYSTO/URETERO W/LITHOTRIPSY &INDWELL STENT INSRT Bilateral 1/23/2023    Procedure: CYSTOSCOPY  RIGHT URETEROSCOPY WITH LITHOTRIPSY HOLMIUM LASER, BILATERAL RETROGRADE PYELOGRAM AND BILATERAL  URETERAL STENT INSERTION;  Surgeon: Anupama Kumar MD;  [facility];  Service: Urology   • MD CYSTO/URETERO W/LITHOTRIPSY &INDWELL STENT INSRT Right 4/3/2023    Procedure: RIGHT URETEROSCOPY WITH LITHOTRIPSY HOLMIUM LASER, RETROGRADE PYELOGRAM; BILATERAL EXCHANGE STENT URETERAL;  Surgeon: Anupama Kumar MD;  Location: [facility];  Service: Urology   • MD INSJ TUNNELED CTR VAD W/SUBQ PORT AGE 5 YR/> N/A 3/21/2023    Procedure: INSERTION VENOUS PORT ( PORT-A-CATH) IR;  Surgeon: Bhavya Novak DO;  [facility];  Service: Interventional Radiology   • TONSILLECTOMY     • URINARY SURGERY Bilateral     bilateral stents     Social History     Socioeconomic History   • Marital status:      Spouse name: None   • Number of children: None   • Years of education: None   • Highest education level: None   Occupational History   • None   Tobacco Use   • Smoking status: Never     Passive exposure: Past   • Smokeless tobacco: Never   Vaping Use   • Vaping Use: Never used   Substance and Sexual Activity   • Alcohol use: Never   • Drug use: Never   • Sexual activity: Not Currently   Other Topics Concern   • None   Social History Narrative    From 4/14/23  note:    Emergency Contact: Lana Padilla (ex-) [phone] (Mobile)    Marital Status:     Caregiver/Support: ex- -Larry Bernal and two dtrs  Children: two dtrs- nataliia 21 in [place] and [place] 27 [facility]    Child/Elder care: no     Housing: two story house    Home Setup: one level    Lives With:  -anay and two dtrs    Daily Living Activities: independent    Durable Medical Equipment: No    Ambulation: independent    Employment: disabled-SSI-$100 and pension-$700    [place] Status/Location: no     Ability to pay bills: yes  No barriers to paying monthly bills  POA/LW/AD: no   - Larry Bernal is healthcare representative  MSW emailed advance directive  Social Determinants of Health     Financial Resource Strain: Not on file   Food Insecurity: No Food Insecurity (2/13/2023)    Hunger Vital Sign    • Worried About Running Out of Food in the Last Year: Never true    • Ran Out of Food in the Last Year: Never true   Transportation Needs: No Transportation Needs (2/13/2023)    PRAPARE - Transportation    • Lack of Transportation (Medical): No    • Lack of Transportation (Non-Medical):  No   Physical Activity: Not on file   Stress: Not on file   Social Connections: Not on file   Intimate Partner Violence: Not on file   Housing Stability: Low Risk  (2/13/2023)    Housing Stability Vital Sign    • Unable to Pay for Housing in the Last Year: No    • Number of Places Lived in the Last Year: Yes    • Unstable Housing in the Last Year: 2     Family History   Problem Relation Age of Onset   • Cancer Mother    • Cirrhosis Father        Current Outpatient Medications:   •  acetaminophen (TYLENOL) 500 mg tablet, Take 2 tablets (1,000 mg total) by mouth 3 (three) times a day, Disp: 180 tablet, Rfl: 2  •  cyanocobalamin (VITAMIN B-12) 1000 MCG tablet, Take 1,000 mcg by mouth daily, Disp: , Rfl:   •  cyclobenzaprine (FLEXERIL) 5 mg tablet, Take 1 tablet (5 mg total) by mouth 3 (three) times a day as needed for muscle spasms, Disp: 90 tablet, Rfl: 2  •  fluorouracil 1,880 mg in CADD/Elastomeric Infusion Device, Infuse 1,880 mg (225 mg/m2/day x 1 67 m2) into a catheter in a vein via infusion device over 120 hours for 5 days  Infusion planned for May 22, 2023 , Disp: 1 each, Rfl: 0  •  oxybutynin (DITROPAN) 5 mg tablet, TAKE 1 TABLET (5 MG TOTAL) BY MOUTH EVERY 6 (SIX) HOURS AS NEEDED (BLADDER SPASMS), Disp: 30 tablet, Rfl: 0  •  oxyCODONE (Roxicodone) 5 immediate release tablet, Take 1-2 tablets (5-10 mg total) by mouth every 4 (four) hours as needed for moderate pain or severe pain Max Daily Amount: 60 mg, Disp: 180 tablet, Rfl: 0  •  predniSONE 10 mg tablet, TAKE 2 TABLETS BY MOUTH EVERY DAY, Disp: 60 tablet, Rfl: 1  •  amLODIPine (NORVASC) 5 mg tablet, Take 5 mg by mouth daily (Patient not taking: Reported on 3/13/2023), Disp: , Rfl:   •  CAPECITABINE PO, Take 1,650 mg by mouth 2 (two) times a day (Patient not taking: Reported on 3/17/2023), Disp: , Rfl:   •  docusate sodium (COLACE) 100 mg capsule, Take 1 capsule (100 mg total) by mouth 2 (two) times a day for 15 days (Patient not taking: Reported on 4/6/2023), Disp: 30 capsule, Rfl: 0  •  potassium chloride (MICRO-K) 10 MEQ CR capsule, Take 4 capsules (40 mEq total) by mouth 2 (two) times a day, Disp: 240 capsule, Rfl: 1  •  Potassium Gluconate 550 MG TABS, Take 1 tablet by mouth in the morning (Patient not taking: Reported on 3/23/2023), Disp: , Rfl:   •  tamsulosin (FLOMAX) 0 4 mg, Take 1 capsule (0 4 mg total) by mouth daily with dinner (Patient not taking: Reported on 4/6/2023), Disp: 15 capsule, Rfl: 0  No current facility-administered medications for this visit      Facility-Administered Medications Ordered in Other Visits:   • "alteplase (CATHFLO) injection 2 mg, 2 mg, Intracatheter, Q1MIN PRN, Marcel Nick MD    Review of Systems   Constitutional: Positive for activity change, appetite change, fatigue and unexpected weight change (recently gaining back some lost weight)  Gastrointestinal: Positive for constipation and rectal pain  Negative for diarrhea, nausea and vomiting  Genitourinary: Positive for flank pain and vaginal pain  Musculoskeletal: Positive for myalgias  Painful muscle spasms  Allergic/Immunologic: Positive for immunocompromised state  Psychiatric/Behavioral: Positive for sleep disturbance (d/t pain)  Negative for dysphoric mood  The patient is not nervous/anxious  All other systems reviewed and are negative  OBJECTIVE:  /58 (BP Location: Left arm, Patient Position: Sitting, Cuff Size: Standard)   Pulse 88   Temp (!) 96 8 °F (36 °C) (Temporal)   Ht 5' 2\" (1 575 m)   Wt 66 5 kg (146 lb 8 oz)   SpO2 99%   BMI 26 80 kg/m²   Physical Exam  Vitals reviewed  Constitutional:       General: She is not in acute distress  Appearance: She is well-groomed and overweight  She is not toxic-appearing  HENT:      Head: Normocephalic and atraumatic  Right Ear: External ear normal       Left Ear: External ear normal    Eyes:      General: No scleral icterus  Right eye: No discharge  Left eye: No discharge  Extraocular Movements: Extraocular movements intact  Conjunctiva/sclera: Conjunctivae normal       Pupils: Pupils are equal, round, and reactive to light  Cardiovascular:      Rate and Rhythm: Normal rate  Pulmonary:      Effort: Pulmonary effort is normal  No tachypnea, bradypnea, accessory muscle usage or respiratory distress  Comments: Able to speak comfortably in complete sentences on room air at rest   Abdominal:      General: There is no distension  Tenderness: There is no guarding     Musculoskeletal:      Cervical back: Normal range of " "motion  Right lower leg: No edema  Left lower leg: No edema  Skin:     General: Skin is dry  Coloration: Skin is not pale  Neurological:      Mental Status: She is alert and oriented to person, place, and time  Cranial Nerves: No dysarthria or facial asymmetry  Gait: Gait normal       Comments: Using no assistive devices for ambulation  Psychiatric:         Attention and Perception: Attention normal          Mood and Affect: Mood and affect normal          Speech: Speech normal          Behavior: Behavior normal  Behavior is cooperative  Thought Content: Thought content normal          Cognition and Memory: Cognition and memory normal          Judgment: Judgment normal           Gerrie Mortimer, MD  [facility] Palliative and Supportive Care      Portions of this document may have been created using dictation software and as such some \"sound alike\" terms may have been generated by the system  Do not hesitate to contact me with any questions or clarifications     "

## 2023-05-24 NOTE — PROGRESS NOTES
"Palliative Outpatient Assessment of Need    LSW completed an assessment of need which was completed with (patient, family, or both) in the office or via phone/video conference    Relationship status:    Duration of relationship:   Name of significant other:  Children and Ages: 2 adult dtrs   Pets: 2 cats  Other important family information: Moved in December from [place] to [place]  Living situation (where and whom): Resides with ex- and 2 dtrs    Patient's primary caregiver: Self    Any limitations of caregiver:  Environmental concerns or barriers:   history: None  Employment history/source of income: 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   Disability: Receiving disability since last month    Concerns regarding literacy: None  Spirituality/ Methodist: Yazdanism    Patient's strengths, social supports, and resources: Supportive family  Cultural information:   Mental Health current or previous: None  Substance use or history: None  Sleep: Interrupted due to pain  Exercise: As tolerated due to pain--reports occasional loss of balance but does not feel she is in need to use any DME currently  Diet/nutrition: Reports appetite as \"okay\"  Durable Medical Equipment needs: None  Transportation: Family  [facility] transport reviewed  Financial concerns: Reports able to pay bills   Advanced Directive: None  [facility] HCR/AD document provided  Other medical or social work providers involved:  Oncology; Urology  Patient/caregiver current level of coping: Pt appears to be coping well emotionally at this time--however pain has been significantly impacting daily activities  Understanding: Pt appears understanding of current medical status  Patient/family concerns and areas of need: Pain; [facility] contact information provided per pt's request  Patient's interests: Reading     I have spent 20 minutes with Patient and family today in which greater than 50% of this time was spent in counseling/coordination of care regarding " Counseling / Coordination of care  *All questions may not be answered due to constraints    Follow-up discussions may need to occur

## 2023-05-24 NOTE — TELEPHONE ENCOUNTER
Reviewed liver biopsy which showed hepatic sarcoidosis and communicated these findings to her ex-  Will TBW with pulmonology and rheum regarding increasing her prednisone and potentially adding UDCA

## 2023-05-25 ENCOUNTER — APPOINTMENT (OUTPATIENT)
Dept: RADIATION ONCOLOGY | Facility: HOSPITAL | Age: 58
End: 2023-05-25
Attending: STUDENT IN AN ORGANIZED HEALTH CARE EDUCATION/TRAINING PROGRAM

## 2023-05-25 ENCOUNTER — APPOINTMENT (OUTPATIENT)
Dept: RADIATION ONCOLOGY | Facility: HOSPITAL | Age: 58
End: 2023-05-25

## 2023-05-27 ENCOUNTER — HOSPITAL ENCOUNTER (OUTPATIENT)
Dept: INFUSION CENTER | Facility: CLINIC | Age: 58
Discharge: HOME | End: 2023-05-27

## 2023-05-27 DIAGNOSIS — C18.7 MALIGNANT NEOPLASM OF SIGMOID COLON (HCC): Primary | ICD-10-CM

## 2023-05-27 LAB
ALBUMIN SERPL BCP-MCNC: 3.5 G/DL (ref 3.5–5)
ALP SERPL-CCNC: 240 U/L (ref 34–104)
ALT SERPL W P-5'-P-CCNC: 37 U/L (ref 7–52)
ANION GAP SERPL CALCULATED.3IONS-SCNC: 7 MMOL/L (ref 4–13)
AST SERPL W P-5'-P-CCNC: 28 U/L (ref 13–39)
BASOPHILS # BLD AUTO: 0.01 THOUSANDS/ÂΜL (ref 0–0.1)
BASOPHILS NFR BLD AUTO: 0 % (ref 0–1)
BILIRUB SERPL-MCNC: 0.62 MG/DL (ref 0.2–1)
BUN SERPL-MCNC: 21 MG/DL (ref 5–25)
CALCIUM SERPL-MCNC: 9.2 MG/DL (ref 8.4–10.2)
CHLORIDE SERPL-SCNC: 106 MMOL/L (ref 96–108)
CO2 SERPL-SCNC: 27 MMOL/L (ref 21–32)
CREAT SERPL-MCNC: 0.88 MG/DL (ref 0.6–1.3)
EOSINOPHIL # BLD AUTO: 0.13 THOUSAND/ÂΜL (ref 0–0.61)
EOSINOPHIL NFR BLD AUTO: 3 % (ref 0–6)
ERYTHROCYTE [DISTWIDTH] IN BLOOD BY AUTOMATED COUNT: 21.7 % (ref 11.6–15.1)
GFR SERPL CREATININE-BSD FRML MDRD: 74 ML/MIN/1.73SQ M
GLUCOSE SERPL-MCNC: 118 MG/DL (ref 65–140)
HCT VFR BLD AUTO: 31.2 % (ref 34.8–46.1)
HGB BLD-MCNC: 9.8 G/DL (ref 11.5–15.4)
IMM GRANULOCYTES # BLD AUTO: 0.01 THOUSAND/UL (ref 0–0.2)
IMM GRANULOCYTES NFR BLD AUTO: 0 % (ref 0–2)
LYMPHOCYTES # BLD AUTO: 0.18 THOUSANDS/ÂΜL (ref 0.6–4.47)
LYMPHOCYTES NFR BLD AUTO: 5 % (ref 14–44)
MCH RBC QN AUTO: 29.4 PG (ref 26.8–34.3)
MCHC RBC AUTO-ENTMCNC: 31.4 G/DL (ref 31.4–37.4)
MCV RBC AUTO: 94 FL (ref 82–98)
MONOCYTES # BLD AUTO: 0.34 THOUSAND/ÂΜL (ref 0.17–1.22)
MONOCYTES NFR BLD AUTO: 9 % (ref 4–12)
NEUTROPHILS # BLD AUTO: 3.2 THOUSANDS/ÂΜL (ref 1.85–7.62)
NEUTS SEG NFR BLD AUTO: 83 % (ref 43–75)
NRBC BLD AUTO-RTO: 0 /100 WBCS
PLATELET # BLD AUTO: 134 THOUSANDS/UL (ref 149–390)
PMV BLD AUTO: 8.7 FL (ref 8.9–12.7)
POTASSIUM SERPL-SCNC: 3.4 MMOL/L (ref 3.5–5.3)
PROT SERPL-MCNC: 6 G/DL (ref 6.4–8.4)
RBC # BLD AUTO: 3.33 MILLION/UL (ref 3.81–5.12)
SODIUM SERPL-SCNC: 140 MMOL/L (ref 135–147)
WBC # BLD AUTO: 3.87 THOUSAND/UL (ref 4.31–10.16)

## 2023-05-27 NOTE — PROGRESS NOTES
Pt  offers no complaints  CADD pump disconnected,  res  Vol  0 mL , good blood return noted  Labs drawn via port  AVS declined  Next appt  confirmed

## 2023-05-31 ENCOUNTER — OFFICE VISIT (OUTPATIENT)
Dept: INTERNAL MEDICINE CLINIC | Facility: CLINIC | Age: 58
End: 2023-05-31

## 2023-05-31 VITALS
DIASTOLIC BLOOD PRESSURE: 72 MMHG | SYSTOLIC BLOOD PRESSURE: 142 MMHG | OXYGEN SATURATION: 97 % | BODY MASS INDEX: 27.38 KG/M2 | HEART RATE: 94 BPM | HEIGHT: 62 IN | WEIGHT: 148.8 LBS

## 2023-05-31 DIAGNOSIS — Z12.31 ENCOUNTER FOR SCREENING MAMMOGRAM FOR MALIGNANT NEOPLASM OF BREAST: Primary | ICD-10-CM

## 2023-05-31 DIAGNOSIS — Z00.00 HEALTHCARE MAINTENANCE: ICD-10-CM

## 2023-05-31 DIAGNOSIS — Z01.419 WELL FEMALE EXAM WITH ROUTINE GYNECOLOGICAL EXAM: ICD-10-CM

## 2023-05-31 DIAGNOSIS — R79.89 ABNORMAL TSH: ICD-10-CM

## 2023-05-31 DIAGNOSIS — L98.9 SKIN LESION: ICD-10-CM

## 2023-05-31 DIAGNOSIS — N63.24 MASS OF LOWER INNER QUADRANT OF LEFT BREAST: ICD-10-CM

## 2023-05-31 RX ORDER — FLUOROURACIL 50 MG/ML
INJECTION, SOLUTION INTRAVENOUS
COMMUNITY
Start: 2023-05-10

## 2023-05-31 NOTE — ASSESSMENT & PLAN NOTE
Pt has been putting off health maintenance previously and gave priority to cancer treatment  She just completed chemo  We discussed health maintenance again today  Refer to gyn for wellness check  Ordered mammo  Pt refused all vaccinations, even given her hx of cancer and sarcoidosis

## 2023-05-31 NOTE — PROGRESS NOTES
Name: Monty Deluna      : 1965      MRN: [id number]  Encounter Provider: Sandra Curry MD  Encounter Date: 2023   [facility]    Assessment & Plan     1  Encounter for screening mammogram for malignant neoplasm of breast  -     Mammo screening bilateral w 3d & cad; Future; Expected date: 2023    2  Skin lesion  Assessment & Plan:  Noticed a nodular skin lesion under left breast  Pt states she just noticed it over the past month  On exam, nodule lesion without sign of infection, no drainage  Seems like a cyst  Picture taken  May have been there for longer than a week  Given pt's other medical hx including cancer, sarcoidosis, hemochromatosis, etc and relatively recent finding, refer to derm to eval  She is due for breast cancer screen, ordered mammo  There is no other palpable breast mass     Orders:  -     Ambulatory Referral to Dermatology; Future    3  Well female exam with routine gynecological exam  -     Ambulatory Referral to Gynecology; Future    4  Abnormal TSH  Assessment & Plan:  Ordered recheck tsh    Orders:  -     TSH, 3rd generation with Free T4 reflex; Future  -     T3; Future    5  Healthcare maintenance  Assessment & Plan:  Pt has been putting off health maintenance previously and gave priority to cancer treatment  She just completed chemo  We discussed health maintenance again today  Refer to gyn for wellness check  Ordered mammo  Pt refused all vaccinations, even given her hx of cancer and sarcoidosis              Subjective      HPI   Pt noticed a skin lump under left breast over the past week  Review of Systems    Current Outpatient Medications on File Prior to Visit   Medication Sig   • acetaminophen (TYLENOL) 500 mg tablet Take 2 tablets (1,000 mg total) by mouth 3 (three) times a day   • cyanocobalamin (VITAMIN B-12) 1000 MCG tablet Take 1,000 mcg by mouth daily   • cyclobenzaprine (FLEXERIL) 5 mg tablet Take 1 tablet (5 mg "total) by mouth 3 (three) times a day as needed for muscle spasms   • docusate sodium (COLACE) 100 mg capsule Take 1 capsule (100 mg total) by mouth 2 (two) times a day for 15 days   • oxybutynin (DITROPAN) 5 mg tablet TAKE 1 TABLET (5 MG TOTAL) BY MOUTH EVERY 6 (SIX) HOURS AS NEEDED (BLADDER SPASMS)   • oxyCODONE (Roxicodone) 5 immediate release tablet Take 1-2 tablets (5-10 mg total) by mouth every 4 (four) hours as needed for moderate pain or severe pain Max Daily Amount: 60 mg   • potassium chloride (MICRO-K) 10 MEQ CR capsule Take 4 capsules (40 mEq total) by mouth 2 (two) times a day   • predniSONE 10 mg tablet TAKE 2 TABLETS BY MOUTH EVERY DAY   • amLODIPine (NORVASC) 5 mg tablet Take 5 mg by mouth daily (Patient not taking: Reported on 3/13/2023)   • CAPECITABINE PO Take 1,650 mg by mouth 2 (two) times a day (Patient not taking: Reported on 3/17/2023)   • Potassium Gluconate 550 MG TABS Take 1 tablet by mouth in the morning (Patient not taking: Reported on 3/23/2023)   • tamsulosin (FLOMAX) 0 4 mg Take 1 capsule (0 4 mg total) by mouth daily with dinner (Patient not taking: Reported on 4/6/2023)       Objective     /72   Pulse 94   Ht 5' 2\" (1 575 m)   Wt 67 5 kg (148 lb 12 8 oz)   SpO2 97%   BMI 27 22 kg/m²     Physical Exam  Wilfred Rivera MD  "

## 2023-05-31 NOTE — ASSESSMENT & PLAN NOTE
Noticed a nodular skin lesion under left breast  Pt states she just noticed it over the past month  On exam, nodule lesion without sign of infection, no drainage  Seems like a cyst  Picture taken  May have been there for longer than a week  Given pt's other medical hx including cancer, sarcoidosis, hemochromatosis, etc and relatively recent finding, refer to derm to tanner  She is due for breast cancer screen, ordered mammo   There is no other palpable breast mass

## 2023-06-02 ENCOUNTER — TELEPHONE (OUTPATIENT)
Dept: OTHER | Facility: OTHER | Age: 58
End: 2023-06-02

## 2023-06-02 NOTE — TELEPHONE ENCOUNTER
States they were told by central scheduling that a Diagnostic mammogram is what needs to be ordered  Order placed by Dr Cyndy Lefort  Please reach out to pt or  to clarify

## 2023-06-05 ENCOUNTER — OFFICE VISIT (OUTPATIENT)
Dept: PULMONOLOGY | Facility: CLINIC | Age: 58
End: 2023-06-05
Payer: COMMERCIAL

## 2023-06-05 ENCOUNTER — APPOINTMENT (OUTPATIENT)
Dept: LAB | Facility: AMBULARY SURGERY CENTER | Age: 58
End: 2023-06-05
Payer: COMMERCIAL

## 2023-06-05 VITALS
DIASTOLIC BLOOD PRESSURE: 72 MMHG | OXYGEN SATURATION: 98 % | SYSTOLIC BLOOD PRESSURE: 130 MMHG | RESPIRATION RATE: 20 BRPM | BODY MASS INDEX: 27.05 KG/M2 | HEART RATE: 84 BPM | TEMPERATURE: 98.6 F | HEIGHT: 62 IN | WEIGHT: 147 LBS

## 2023-06-05 DIAGNOSIS — D86.9 SARCOIDOSIS: ICD-10-CM

## 2023-06-05 DIAGNOSIS — D86.9 SARCOIDOSIS: Primary | ICD-10-CM

## 2023-06-05 DIAGNOSIS — C18.7 MALIGNANT NEOPLASM OF SIGMOID COLON (HCC): ICD-10-CM

## 2023-06-05 DIAGNOSIS — R74.8 ELEVATED LIVER ENZYMES: ICD-10-CM

## 2023-06-05 LAB
ALBUMIN SERPL BCP-MCNC: 3.2 G/DL (ref 3.5–5)
ALP SERPL-CCNC: 466 U/L (ref 46–116)
ALT SERPL W P-5'-P-CCNC: 117 U/L (ref 12–78)
ANION GAP SERPL CALCULATED.3IONS-SCNC: 3 MMOL/L (ref 4–13)
AST SERPL W P-5'-P-CCNC: 83 U/L (ref 5–45)
BASOPHILS # BLD AUTO: 0.01 THOUSANDS/ÂΜL (ref 0–0.1)
BASOPHILS NFR BLD AUTO: 0 % (ref 0–1)
BILIRUB SERPL-MCNC: 0.94 MG/DL (ref 0.2–1)
BUN SERPL-MCNC: 24 MG/DL (ref 5–25)
CALCIUM ALBUM COR SERPL-MCNC: 10.2 MG/DL (ref 8.3–10.1)
CALCIUM SERPL-MCNC: 9.6 MG/DL (ref 8.3–10.1)
CHLORIDE SERPL-SCNC: 110 MMOL/L (ref 96–108)
CO2 SERPL-SCNC: 24 MMOL/L (ref 21–32)
CREAT SERPL-MCNC: 0.91 MG/DL (ref 0.6–1.3)
EOSINOPHIL # BLD AUTO: 0.02 THOUSAND/ÂΜL (ref 0–0.61)
EOSINOPHIL NFR BLD AUTO: 0 % (ref 0–6)
ERYTHROCYTE [DISTWIDTH] IN BLOOD BY AUTOMATED COUNT: 21.6 % (ref 11.6–15.1)
GFR SERPL CREATININE-BSD FRML MDRD: 71 ML/MIN/1.73SQ M
GLUCOSE P FAST SERPL-MCNC: 122 MG/DL (ref 65–99)
HCT VFR BLD AUTO: 34.9 % (ref 34.8–46.1)
HGB BLD-MCNC: 10.6 G/DL (ref 11.5–15.4)
IMM GRANULOCYTES # BLD AUTO: 0.04 THOUSAND/UL (ref 0–0.2)
IMM GRANULOCYTES NFR BLD AUTO: 1 % (ref 0–2)
LYMPHOCYTES # BLD AUTO: 0.16 THOUSANDS/ÂΜL (ref 0.6–4.47)
LYMPHOCYTES NFR BLD AUTO: 3 % (ref 14–44)
MCH RBC QN AUTO: 29 PG (ref 26.8–34.3)
MCHC RBC AUTO-ENTMCNC: 30.4 G/DL (ref 31.4–37.4)
MCV RBC AUTO: 95 FL (ref 82–98)
MONOCYTES # BLD AUTO: 0.2 THOUSAND/ÂΜL (ref 0.17–1.22)
MONOCYTES NFR BLD AUTO: 4 % (ref 4–12)
NEUTROPHILS # BLD AUTO: 5.18 THOUSANDS/ÂΜL (ref 1.85–7.62)
NEUTS SEG NFR BLD AUTO: 92 % (ref 43–75)
NRBC BLD AUTO-RTO: 0 /100 WBCS
PLATELET # BLD AUTO: 175 THOUSANDS/UL (ref 149–390)
PMV BLD AUTO: 10 FL (ref 8.9–12.7)
POTASSIUM SERPL-SCNC: 3.6 MMOL/L (ref 3.5–5.3)
PROT SERPL-MCNC: 7 G/DL (ref 6.4–8.4)
RBC # BLD AUTO: 3.66 MILLION/UL (ref 3.81–5.12)
SODIUM SERPL-SCNC: 137 MMOL/L (ref 135–147)
WBC # BLD AUTO: 5.61 THOUSAND/UL (ref 4.31–10.16)

## 2023-06-05 PROCEDURE — 82164 ANGIOTENSIN I ENZYME TEST: CPT

## 2023-06-05 PROCEDURE — 99215 OFFICE O/P EST HI 40 MIN: CPT | Performed by: INTERNAL MEDICINE

## 2023-06-05 PROCEDURE — 36415 COLL VENOUS BLD VENIPUNCTURE: CPT

## 2023-06-05 NOTE — LETTER
June 5, 2023     Mo Cordero MD  [id number] [facility] [address]  [address] [place] [address]    Patient: Telma Barrera   YOB: 1965   Date of Visit: 6/5/2023       Dear Dr Ravi Montilla: Thank you for referring Telma Barrera to me for evaluation  Below are my notes for this consultation  If you have questions, please do not hesitate to call me  I look forward to following your patient along with you  Sincerely,        Dimitri Moeller DO        CC: MD Dimitri Marquez DO  6/5/2023 10:44 AM  Sign when Signing Visit  Pulmonary Follow Up Note   Telma Barrera 56 y o  female MRN: [id number]  6/5/2023      Assessment:  Sarcoidosis - lung and hepatic involvement  Miliary pattern on prior CT, hypercalcemia, elevated ACE level and NC granulomas on pleural biopsy as listed below, now with granulomas on liver biopsy  Clinically improved on prednisone - course complicated by numerous other medical problems and colon cancer  PFTs have been normal and she is without pulmonary symptoms  Her pulmonary disease appears to be quiescent at the time  I advised to continue with prednisone 20mg daily for now - I will defer further prednisone tapering to Dr Brian Sheriff given hepatic component but AST/ALT have normalized    I am also rechecking ACE level now, CMP and follow AST/ALT and Calcium  Will hold on bactrim prophylaxis for the time being  Needs opthalmology exam and consult placed to see Dr Geovani James - encouraged her to schedule  Has evaluation with Rheumatology in July  Serial CT scans noted for her malignancy therapy/tracking  Follow up in 3 months     Colon Cancer post colostomy  Noted repeat imaging  Defer to medical oncology and CRS further plans     Liver disease - hepatic sarcoid   Granulomas on repeat biopsy  Defer to Dr Brian Shreiff initiation of UDCA     Recurrent nephrolithiasis with ureteral obstruction in setting of hypercalcemia  Further care per urology  Need to trend serial Calcium levels as we taper prednisone    Plan:    Diagnoses and all orders for this visit:    Sarcoidosis  -     Angiotensin converting enzyme; Future    Elevated liver enzymes  -     Ambulatory Referral to Pulmonology  -     Comprehensive metabolic panel; Future    Malignant neoplasm of sigmoid colon ([address] )    Other orders  -     fluorouracil (ADRUCIL) 5 GM/100ML SOLN;  (Patient not taking: Reported on 5/31/2023)        Return in about 3 months (around 9/5/2023) for Recheck  History of Present Illness   HPI:  Loraine Brady is a 56 y o  female who presented in March 2023 for sarcoid evaluation  She has a complicated history and has primarily been treated at [facility]  She states she had 1-2 years of worsened dyspnea and chest pain  She had developed kidney stones with hypercalcemia (14 8 May 2022) and had CT revealing miliary pattern on her lung images  She was eventually seen by pulmonary (Dr Melodie Valenzuela) and referred for surgical pleural biopsy for miliary pattern and pleural thickening on CT Chest and concern for MTB vs sarcoid  Biopsy revealed NC granulomas and she was initiated on prednisone 40mg daily around July 2022  During this evaluation she had CT/PET which revealed lower sigmoid FDG avid lesion  She was diagnosed with colon cancer had diverting colostomy and started on chemotherapy and XRT  She had abnormal LFTs and underwent liver biopsy revealing concerns for hemochromatosis  She had moved to [place] which had delayed some further cancer therapies and she has stopped Capecitabine  She reports she has tolerated prednisone 20mg daily well  She denied any dyspnea, reports very rare cough, no sputum production, denied fevers or chills, no hemoptysis, no vision changes  She does report to some nail bed changes, denied arthralgias  She remains eager for colostomy removal and resection  She has been undergoing potassium supplementation    She has noted intermittent scant hematuria and has undergone evaluation by urology  Review of Systems   Constitutional: Negative for activity change, chills, fatigue and fever  HENT: Negative for mouth sores, nosebleeds, sore throat and trouble swallowing  Respiratory: Positive for cough  Negative for chest tightness, shortness of breath and wheezing  Cardiovascular: Negative for chest pain, palpitations and leg swelling  Gastrointestinal: Negative for abdominal pain, nausea and vomiting  Genitourinary: Positive for hematuria  Negative for dysuria and frequency  Musculoskeletal: Negative for arthralgias and gait problem  Skin: Positive for color change  Neurological: Negative for light-headedness and headaches  Hematological: Negative for adenopathy  Psychiatric/Behavioral: Negative for sleep disturbance  The patient is not nervous/anxious          Historical Information   Past Medical History:   Diagnosis Date   • Cancer [facility])    • [facility] cancer ([facility] )    • Fall    • Headache    • Hemochromatosis, unspecified    • History of transfusion     2022 - no adverse reaction   • Kidney calculi    • Kidney stone    • Liver disease     hemangioma   • Muscle weakness    • Personal history of COVID-19 12/2022   • Sarcoidosis    • Seizures ([facility] )     2022     Past Surgical History:   Procedure Laterality Date   • ABSCESS DRAINAGE      left breast   • CHEST TUBE INSERTION     • COLON SURGERY      colostomy   • COLONOSCOPY     • FL GUIDED CENTRAL VENOUS ACCESS DEVICE INSERTION  3/21/2023   • FL RETROGRADE PYELOGRAM  1/23/2023   • FL RETROGRADE PYELOGRAM  4/3/2023   • IR BIOPSY LIVER MASS  5/9/2023   • LUNG BIOPSY     • AZ CYSTO/URETERO W/LITHOTRIPSY &INDWELL STENT INSRT Bilateral 1/23/2023    Procedure: CYSTOSCOPY  RIGHT URETEROSCOPY WITH LITHOTRIPSY HOLMIUM LASER, BILATERAL RETROGRADE PYELOGRAM AND BILATERAL  URETERAL STENT INSERTION;  Surgeon: Bel Guajardo MD;  Location[facility];  Service: Urology   • AZ CYSTO/URETERO W/LITHOTRIPSY &INDWELL STENT INSRT Right 4/3/2023    Procedure: RIGHT URETEROSCOPY WITH LITHOTRIPSY HOLMIUM LASER, RETROGRADE PYELOGRAM; BILATERAL EXCHANGE STENT URETERAL;  Surgeon: Rosezella Spurling, MD;  Location: [facility];  Service: Urology   • MN INSJ TUNNELED CTR VAD W/SUBQ PORT AGE 5 YR/> N/A 3/21/2023    Procedure: INSERTION VENOUS PORT ( PORT-A-CATH) IR;  Surgeon: Sarkis Vila DO;  [facility];  Service: Interventional Radiology   • TONSILLECTOMY     • URINARY SURGERY Bilateral     bilateral stents     Family History   Problem Relation Age of Onset   • Cancer Mother    • Cirrhosis Father          Meds/Allergies     Current Outpatient Medications:   •  acetaminophen (TYLENOL) 500 mg tablet, Take 2 tablets (1,000 mg total) by mouth 3 (three) times a day, Disp: 180 tablet, Rfl: 2  •  cyanocobalamin (VITAMIN B-12) 1000 MCG tablet, Take 1,000 mcg by mouth daily, Disp: , Rfl:   •  cyclobenzaprine (FLEXERIL) 5 mg tablet, Take 1 tablet (5 mg total) by mouth 3 (three) times a day as needed for muscle spasms, Disp: 90 tablet, Rfl: 2  •  docusate sodium (COLACE) 100 mg capsule, Take 1 capsule (100 mg total) by mouth 2 (two) times a day for 15 days, Disp: 30 capsule, Rfl: 0  •  oxybutynin (DITROPAN) 5 mg tablet, TAKE 1 TABLET (5 MG TOTAL) BY MOUTH EVERY 6 (SIX) HOURS AS NEEDED (BLADDER SPASMS), Disp: 30 tablet, Rfl: 0  •  oxyCODONE (Roxicodone) 5 immediate release tablet, Take 1-2 tablets (5-10 mg total) by mouth every 4 (four) hours as needed for moderate pain or severe pain Max Daily Amount: 60 mg, Disp: 180 tablet, Rfl: 0  •  potassium chloride (MICRO-K) 10 MEQ CR capsule, Take 4 capsules (40 mEq total) by mouth 2 (two) times a day (Patient taking differently: Take 40 mEq by mouth daily), Disp: 240 capsule, Rfl: 1  •  predniSONE 10 mg tablet, TAKE 2 TABLETS BY MOUTH EVERY DAY, Disp: 60 tablet, Rfl: 1  •  amLODIPine (NORVASC) 5 mg tablet, Take 5 mg by mouth daily (Patient not taking: Reported on 3/13/2023), Disp: , Rfl:   • "CAPECITABINE PO, Take 1,650 mg by mouth 2 (two) times a day (Patient not taking: Reported on 3/17/2023), Disp: , Rfl:   •  fluorouracil (ADRUCIL) 5 GM/100ML SOLN, , Disp: , Rfl:   •  Potassium Gluconate 550 MG TABS, Take 1 tablet by mouth in the morning (Patient not taking: Reported on 6/5/2023), Disp: , Rfl:   •  tamsulosin (FLOMAX) 0 4 mg, Take 1 capsule (0 4 mg total) by mouth daily with dinner (Patient not taking: Reported on 4/6/2023), Disp: 15 capsule, Rfl: 0  Allergies   Allergen Reactions   • Oxaliplatin Shortness Of Breath     Reactions occurred with 2nd and 3rd infusions (about 1-3 hours from initiation of infusion) and required treatment with steroids and antihistamines  Please refer to allergy note on 2/7/2023 for detailed description of her reactions  Vitals: Blood pressure 130/72, pulse 84, temperature 98 6 °F (37 °C), resp  rate 20, height 5' 2\" (1 575 m), weight 66 7 kg (147 lb), SpO2 98 %  Body mass index is 26 89 kg/m²  Oxygen Therapy  SpO2: 98 %  Oxygen Therapy: None (Room air)      Physical Exam  Physical Exam  Vitals reviewed  Constitutional:       General: She is not in acute distress  Appearance: Normal appearance  She is well-developed and normal weight  She is not ill-appearing, toxic-appearing or diaphoretic  HENT:      Head: Normocephalic and atraumatic  Right Ear: External ear normal       Left Ear: External ear normal       Nose: Nose normal  No congestion or rhinorrhea  Mouth/Throat:      Mouth: Mucous membranes are moist       Pharynx: Oropharynx is clear  No oropharyngeal exudate  Eyes:      General: No scleral icterus  Right eye: No discharge  Left eye: No discharge  Conjunctiva/sclera: Conjunctivae normal       Pupils: Pupils are equal, round, and reactive to light  Neck:      Vascular: No JVD  Trachea: No tracheal deviation  Cardiovascular:      Rate and Rhythm: Normal rate and regular rhythm        Heart sounds: Normal heart " "sounds  No murmur heard  No gallop  Pulmonary:      Effort: Pulmonary effort is normal  No respiratory distress  Breath sounds: Normal breath sounds  No stridor  No wheezing, rhonchi or rales  Abdominal:      General: Bowel sounds are normal  There is no distension  Palpations: Abdomen is soft  Tenderness: There is no abdominal tenderness  There is no guarding or rebound  Comments: Colostomy in place, +BS soft NTND, no rebound   Musculoskeletal:         General: No deformity  Right lower leg: No edema  Left lower leg: No edema  Lymphadenopathy:      Cervical: No cervical adenopathy  Skin:     General: Skin is warm and dry  Coloration: Skin is not jaundiced  Findings: No erythema or rash  Neurological:      General: No focal deficit present  Mental Status: She is alert and oriented to person, place, and time  Mental status is at baseline  Psychiatric:         Mood and Affect: Mood normal          Behavior: Behavior normal          Thought Content: Thought content normal          Labs: I have personally reviewed pertinent lab results  Lab Results   Component Value Date    HCT 31 2 (L) 05/27/2023    HGB 9 8 (L) 05/27/2023    MCV 94 05/27/2023     (L) 05/27/2023    WBC 3 87 (L) 05/27/2023     Lab Results   Component Value Date    BUN 21 05/27/2023    CALCIUM 9 2 05/27/2023     05/27/2023    CO2 27 05/27/2023    CREATININE 0 88 05/27/2023    K 3 4 (L) 05/27/2023     No results found for: \"IGE\"  Lab Results   Component Value Date    ALKPHOS 240 (H) 05/27/2023    ALT 37 05/27/2023    AST 28 05/27/2023       [address]  ACE 4/30/2022 = >360  ACE 5/2/2022 - >360  ACE 6/28/2022 -> 175     FINAL PATHOLOGIC DIAGNOSIS      A , B  PLEURA, LEFT, BIOPSIES #1 AND #2:  - NON-CASEATING GRANULOMATOUS INFLAMMATION (SEE NOTE)  NOTE: The pleura is thickened by innumerable non-caseating granulomas admixed with lymphocytes   AFB and GMS stains are " "pending and will be reported in an addendum  Tissue culture was also performed and those results are pending  In the absence of an infectious etiology the histologic findings, along with the clinical history and imaging, are highly suggestive of Sarcoidosis  Electronically signed by Viridiana Hyde MD on 6/9/2022 at  2:00 PM   Intraoperative Consultation      A  PLEURA, LEFT, BIOPSY:  - NUMEROUS NON-NECROTIZING Qutami Francoise Brito MD; 3:23 PM; 6/7/2022   Clinical Information      UNSPECIFIED EXAMINATION   Gross Description      A  Received fresh for intraoperative consultation labeled with the patient's name, other identifying information and \"left pleural biopsy\" is a 0 9 x 0 4 x 0 2 cm aggregate of tan-gray soft friable soft tissue, submitted in toto for frozen section in A1  B   Received in formalin labeled with the patient's name and other identifying information and \"left pleural biopsy #2\" is a 2 2 x 2 2 x 0 6 cm aggregate of pink-tan irregular tissue fragments that are entirely submitted in 1 cassette  MZ   #1 ADDENDUM      AFB AND GMS SPECIAL STAINS ARE NEGATIVE FOR MYCOBACTERIA AND FUNGAL ELEMENTS RESPECTIVELY  Fungal and AFB cultures negative     FINAL PATHOLOGIC DIAGNOSIS      RECTOSIGMOID COLON, MASS, BIOPSY:  - INVASIVE MODERATELY DIFFERENTIATED COLONIC ADENOCARCINOMA  FINAL PATHOLOGIC DIAGNOSIS      LIVER, SEGMENT 3, RESECTION:  - HYALINIZED SUBCAPSULAR AND INTRA-PARENCHYMAL NODULES  NO MALIGNANCY SEEN  SEE NOTE  - MILD MACROVESICULAR STEATOSIS (25%)  NO EVIDENCE OF STEATOHEPATITIS  TRICHOME STAIN SHOWS MILD PORTAL FIBROSIS    - 2+ IRON DEPOSITION WITHIN KUPFFER CELLS (IRON STAIN), CONSISTENT WITH SECONDARY HEMOCHROMATOSIS  NOTE: THIS LIKELY REPRESENTS A MARKEDLY SCLEROTIC HEMANGIOMA  OR CHANGES SECONDARY TO INJURY  Final Diagnosis  A  Liver, lesion:  - Mild steatosis  - Sinusoidal dilatation with perisinusoidal fibrosis    - Negative for " metastatic carcinoma  Note: The cores are thin, containing portions of only 4 portal tracts  At least 10 portal areas are needed for adequate evaluation of  fibrosis and general portal characteristics  In addition to mild steatosis, focal non-necrotizing granulomas is present  In this biopsy, there  is no significant duct-centric inflammation, florid duct lesions, duct loss, cholestasis seen  The current findings are nonspecific and the  differential diagnosis includes autoimmune related injury, infectious etiology, medication, outflow obstruction among others  Clinical and  laboratory (Including AMA) correlation is suggested for definitive diagnosis  Imaging and other studies: I have personally reviewed pertinent reports  and I have personally reviewed pertinent films in PACS  CT C/A/P 3/18/2023 - RUL interstitial opacities with some nodular features and left basilar atelectasis, no sig mediastinal adenopathy, ureteral stents, hypoattenuation inferior right hepatic lobe    CT Abd/Pelvis 2022 - decreased right hydronephrosis, stable 1cm right adrenal nodules, bilateral ureteral stents, basilar atelectasis without evidence of miliary pattern     CT C/A/P [facility] 10/25/2022 - LUNGS: The trachea and central bronchi appear patent  Interval improvement in previously seen diffuse nodular opacities throughout both lungs  Stable subpleural opacity peripheral left lung base  A 1 2 cm pleural-based opacity inferior lingula probably representing subsegmental atelectatic changes  Pulmonary function testin2023 - Ratio 82%, FVC 96%, FEV1 93%, TLC 83%, RV 64%, DLCO 74% - normal spirometry, normal TLC, normal diffusion     3/7/2023 - Ratio 86%, FVC 2 78L (87%), FEV1 2 38L (95%) - normal spirometry     EKG, Pathology, and Other Studies: I have personally reviewed pertinent reports      TTE [facility] 10/2022 - EF 55%, mild MVR, normal RV size/funciton     Myocardial perfusion scan [facility] 10/2022 - normal myocardial perfusion study        Dandre Hopson DO, [facility]

## 2023-06-05 NOTE — PROGRESS NOTES
Pulmonary Follow Up Note   Fannie Garcia 56 y o  female MRN: [id number]  6/5/2023      Assessment:  1  Sarcoidosis - lung and hepatic involvement  • Miliary pattern on prior CT, hypercalcemia, elevated ACE level and NC granulomas on pleural biopsy as listed below, now with granulomas on liver biopsy  • Clinically improved on prednisone - course complicated by numerous other medical problems and colon cancer  • PFTs have been normal and she is without pulmonary symptoms  • Her pulmonary disease appears to be quiescent at the time  • I advised to continue with prednisone 20mg daily for now - I will defer further prednisone tapering to Dr Patricia Dolan given hepatic component but AST/ALT have normalized  I am also rechecking ACE level now, CMP and follow AST/ALT and Calcium  • Will hold on bactrim prophylaxis for the time being  • Needs opthalmology exam and consult placed to see Dr Summers Body - encouraged her to schedule  • Has evaluation with Rheumatology in July  • Serial CT scans noted for her malignancy therapy/tracking  • Follow up in 3 months     2  Colon Cancer post colostomy  • Noted repeat imaging  • Defer to medical oncology and CRS further plans     3  Liver disease - hepatic sarcoid   • Granulomas on repeat biopsy  • Defer to Dr Patricia Dolan initiation of UDCA     4  Recurrent nephrolithiasis with ureteral obstruction in setting of hypercalcemia  • Further care per urology  • Need to trend serial Calcium levels as we taper prednisone    Plan:    Diagnoses and all orders for this visit:    Sarcoidosis  -     Angiotensin converting enzyme; Future    Elevated liver enzymes  -     Ambulatory Referral to Pulmonology  -     Comprehensive metabolic panel; Future    Malignant neoplasm of sigmoid colon ([facility] [address] )    Other orders  -     fluorouracil (ADRUCIL) 5 GM/100ML SOLN;  (Patient not taking: Reported on 5/31/2023)        Return in about 3 months (around 9/5/2023) for Recheck      History of Present Illness   HPI:  Fannie Garcia is a 56 y o  female who presented in March 2023 for sarcoid evaluation  She has a complicated history and has primarily been treated at [facility]  She states she had 1-2 years of worsened dyspnea and chest pain  She had developed kidney stones with hypercalcemia (14 8 May 2022) and had CT revealing miliary pattern on her lung images  She was eventually seen by pulmonary (Dr Roxy Andrea) and referred for surgical pleural biopsy for miliary pattern and pleural thickening on CT Chest and concern for MTB vs sarcoid  Biopsy revealed NC granulomas and she was initiated on prednisone 40mg daily around July 2022  During this evaluation she had CT/PET which revealed lower sigmoid FDG avid lesion  She was diagnosed with colon cancer had diverting colostomy and started on chemotherapy and XRT  She had abnormal LFTs and underwent liver biopsy revealing concerns for hemochromatosis  She had moved to [place] which had delayed some further cancer therapies and she has stopped Capecitabine  She reports she has tolerated prednisone 20mg daily well  She denied any dyspnea, reports very rare cough, no sputum production, denied fevers or chills, no hemoptysis, no vision changes  She does report to some nail bed changes, denied arthralgias  She remains eager for colostomy removal and resection  She has been undergoing potassium supplementation  She has noted intermittent scant hematuria and has undergone evaluation by urology  Review of Systems   Constitutional: Negative for activity change, chills, fatigue and fever  HENT: Negative for mouth sores, nosebleeds, sore throat and trouble swallowing  Respiratory: Positive for cough  Negative for chest tightness, shortness of breath and wheezing  Cardiovascular: Negative for chest pain, palpitations and leg swelling  Gastrointestinal: Negative for abdominal pain, nausea and vomiting  Genitourinary: Positive for hematuria  Negative for dysuria and frequency  Musculoskeletal: Negative for arthralgias and gait problem  Skin: Positive for color change  Neurological: Negative for light-headedness and headaches  Hematological: Negative for adenopathy  Psychiatric/Behavioral: Negative for sleep disturbance  The patient is not nervous/anxious          Historical Information   Past Medical History:   Diagnosis Date   • Cancer [facility])    • Colon cancer ([facility] [address] )    • Fall    • Headache    • Hemochromatosis, unspecified    • History of transfusion     2022 - no adverse reaction   • Kidney calculi    • Kidney stone    • Liver disease     hemangioma   • Muscle weakness    • Personal history of COVID-19 12/2022   • Sarcoidosis    • Seizures ([facility] [address] )     2022     Past Surgical History:   Procedure Laterality Date   • ABSCESS DRAINAGE      left breast   • CHEST TUBE INSERTION     • COLON SURGERY      colostomy   • COLONOSCOPY     • FL GUIDED CENTRAL VENOUS ACCESS DEVICE INSERTION  3/21/2023   • FL RETROGRADE PYELOGRAM  1/23/2023   • FL RETROGRADE PYELOGRAM  4/3/2023   • IR BIOPSY LIVER MASS  5/9/2023   • LUNG BIOPSY     • UT CYSTO/URETERO W/LITHOTRIPSY &INDWELL STENT INSRT Bilateral 1/23/2023    Procedure: CYSTOSCOPY  RIGHT URETEROSCOPY WITH LITHOTRIPSY HOLMIUM LASER, BILATERAL RETROGRADE PYELOGRAM AND BILATERAL  URETERAL STENT INSERTION;  Surgeon: Brittany Ferreira MD;  [facility];  Service: Urology   • UT CYSTO/URETERO W/LITHOTRIPSY &INDWELL STENT INSRT Right 4/3/2023    Procedure: RIGHT URETEROSCOPY WITH LITHOTRIPSY HOLMIUM LASER, RETROGRADE PYELOGRAM; BILATERAL EXCHANGE STENT URETERAL;  Surgeon: Brittany Ferreira MD;  Location: [facility];  Service: Urology   • UT INSJ TUNNELED CTR VAD W/SUBQ PORT AGE 5 YR/> N/A 3/21/2023    Procedure: INSERTION VENOUS PORT ( PORT-A-CATH) IR;  Surgeon: Lew Durham DO;  [facility];  Service: Interventional Radiology   • TONSILLECTOMY     • URINARY SURGERY Bilateral     bilateral stents     Family History Problem Relation Age of Onset   • Cancer Mother    • Cirrhosis Father          Meds/Allergies     Current Outpatient Medications:   •  acetaminophen (TYLENOL) 500 mg tablet, Take 2 tablets (1,000 mg total) by mouth 3 (three) times a day, Disp: 180 tablet, Rfl: 2  •  cyanocobalamin (VITAMIN B-12) 1000 MCG tablet, Take 1,000 mcg by mouth daily, Disp: , Rfl:   •  cyclobenzaprine (FLEXERIL) 5 mg tablet, Take 1 tablet (5 mg total) by mouth 3 (three) times a day as needed for muscle spasms, Disp: 90 tablet, Rfl: 2  •  docusate sodium (COLACE) 100 mg capsule, Take 1 capsule (100 mg total) by mouth 2 (two) times a day for 15 days, Disp: 30 capsule, Rfl: 0  •  oxybutynin (DITROPAN) 5 mg tablet, TAKE 1 TABLET (5 MG TOTAL) BY MOUTH EVERY 6 (SIX) HOURS AS NEEDED (BLADDER SPASMS), Disp: 30 tablet, Rfl: 0  •  oxyCODONE (Roxicodone) 5 immediate release tablet, Take 1-2 tablets (5-10 mg total) by mouth every 4 (four) hours as needed for moderate pain or severe pain Max Daily Amount: 60 mg, Disp: 180 tablet, Rfl: 0  •  potassium chloride (MICRO-K) 10 MEQ CR capsule, Take 4 capsules (40 mEq total) by mouth 2 (two) times a day (Patient taking differently: Take 40 mEq by mouth daily), Disp: 240 capsule, Rfl: 1  •  predniSONE 10 mg tablet, TAKE 2 TABLETS BY MOUTH EVERY DAY, Disp: 60 tablet, Rfl: 1  •  amLODIPine (NORVASC) 5 mg tablet, Take 5 mg by mouth daily (Patient not taking: Reported on 3/13/2023), Disp: , Rfl:   •  CAPECITABINE PO, Take 1,650 mg by mouth 2 (two) times a day (Patient not taking: Reported on 3/17/2023), Disp: , Rfl:   •  fluorouracil (ADRUCIL) 5 GM/100ML SOLN, , Disp: , Rfl:   •  Potassium Gluconate 550 MG TABS, Take 1 tablet by mouth in the morning (Patient not taking: Reported on 6/5/2023), Disp: , Rfl:   •  tamsulosin (FLOMAX) 0 4 mg, Take 1 capsule (0 4 mg total) by mouth daily with dinner (Patient not taking: Reported on 4/6/2023), Disp: 15 capsule, Rfl: 0  Allergies   Allergen Reactions   • Oxaliplatin "Shortness Of Breath     Reactions occurred with 2nd and 3rd infusions (about 1-3 hours from initiation of infusion) and required treatment with steroids and antihistamines  Please refer to allergy note on 2/7/2023 for detailed description of her reactions  Vitals: Blood pressure 130/72, pulse 84, temperature 98 6 °F (37 °C), resp  rate 20, height 5' 2\" (1 575 m), weight 66 7 kg (147 lb), SpO2 98 %  Body mass index is 26 89 kg/m²  Oxygen Therapy  SpO2: 98 %  Oxygen Therapy: None (Room air)      Physical Exam  Physical Exam  Vitals reviewed  Constitutional:       General: She is not in acute distress  Appearance: Normal appearance  She is well-developed and normal weight  She is not ill-appearing, toxic-appearing or diaphoretic  HENT:      Head: Normocephalic and atraumatic  Right Ear: External ear normal       Left Ear: External ear normal       Nose: Nose normal  No congestion or rhinorrhea  Mouth/Throat:      Mouth: Mucous membranes are moist       Pharynx: Oropharynx is clear  No oropharyngeal exudate  Eyes:      General: No scleral icterus  Right eye: No discharge  Left eye: No discharge  Conjunctiva/sclera: Conjunctivae normal       Pupils: Pupils are equal, round, and reactive to light  Neck:      Vascular: No JVD  Trachea: No tracheal deviation  Cardiovascular:      Rate and Rhythm: Normal rate and regular rhythm  Heart sounds: Normal heart sounds  No murmur heard  No gallop  Pulmonary:      Effort: Pulmonary effort is normal  No respiratory distress  Breath sounds: Normal breath sounds  No stridor  No wheezing, rhonchi or rales  Abdominal:      General: Bowel sounds are normal  There is no distension  Palpations: Abdomen is soft  Tenderness: There is no abdominal tenderness  There is no guarding or rebound  Comments: Colostomy in place, +BS soft NTND, no rebound   Musculoskeletal:         General: No deformity        Right " "lower leg: No edema  Left lower leg: No edema  Lymphadenopathy:      Cervical: No cervical adenopathy  Skin:     General: Skin is warm and dry  Coloration: Skin is not jaundiced  Findings: No erythema or rash  Neurological:      General: No focal deficit present  Mental Status: She is alert and oriented to person, place, and time  Mental status is at baseline  Psychiatric:         Mood and Affect: Mood normal          Behavior: Behavior normal          Thought Content: Thought content normal          Labs: I have personally reviewed pertinent lab results  Lab Results   Component Value Date    HCT 31 2 (L) 05/27/2023    HGB 9 8 (L) 05/27/2023    MCV 94 05/27/2023     (L) 05/27/2023    WBC 3 87 (L) 05/27/2023     Lab Results   Component Value Date    BUN 21 05/27/2023    CALCIUM 9 2 05/27/2023     05/27/2023    CO2 27 05/27/2023    CREATININE 0 88 05/27/2023    K 3 4 (L) 05/27/2023     No results found for: \"IGE\"  Lab Results   Component Value Date    ALKPHOS 240 (H) 05/27/2023    ALT 37 05/27/2023    AST 28 05/27/2023       [address]   ACE 4/30/2022 = >360  ACE 5/2/2022 - >360  ACE 6/28/2022 -> 175     FINAL PATHOLOGIC DIAGNOSIS      A , B  PLEURA, LEFT, BIOPSIES #1 AND #2:  - NON-CASEATING GRANULOMATOUS INFLAMMATION (SEE NOTE)      NOTE: The pleura is thickened by innumerable non-caseating granulomas admixed with lymphocytes  AFB and GMS stains are pending and will be reported in an addendum  Tissue culture was also performed and those results are pending  In the absence of an infectious etiology the histologic findings, along with the clinical history and imaging, are highly suggestive of Sarcoidosis  Electronically signed by Joseph Boland MD on 6/9/2022 at  2:00 PM   Intraoperative Consultation      A   PLEURA, LEFT, BIOPSY:  - NUMEROUS NON-NECROTIZING GRANULOMAS     Ran Brito MD; 3:23 PM; 6/7/2022   Clinical Information      UNSPECIFIED " "EXAMINATION   Gross Description      A   Received fresh for intraoperative consultation labeled with the patient's name, other identifying information and \"left pleural biopsy\" is a 0 9 x 0 4 x 0 2 cm aggregate of tan-gray soft friable soft tissue, submitted in toto for frozen section in A1       B   Received in formalin labeled with the patient's name and other identifying information and \"left pleural biopsy #2\" is a 2 2 x 2 2 x 0 6 cm aggregate of pink-tan irregular tissue fragments that are entirely submitted in 1 cassette      MZ   #1 ADDENDUM      AFB AND GMS SPECIAL STAINS ARE NEGATIVE FOR MYCOBACTERIA AND FUNGAL ELEMENTS RESPECTIVELY       Fungal and AFB cultures negative     FINAL PATHOLOGIC DIAGNOSIS      RECTOSIGMOID COLON, MASS, BIOPSY:  - INVASIVE MODERATELY DIFFERENTIATED COLONIC ADENOCARCINOMA           FINAL PATHOLOGIC DIAGNOSIS      LIVER, SEGMENT 3, RESECTION:  - HYALINIZED SUBCAPSULAR AND INTRA-PARENCHYMAL NODULES   NO MALIGNANCY SEEN  SEE NOTE  - MILD MACROVESICULAR STEATOSIS (25%)  NO EVIDENCE OF STEATOHEPATITIS   TRICHOME STAIN SHOWS MILD PORTAL FIBROSIS    - 2+ IRON DEPOSITION WITHIN KUPFFER CELLS (IRON STAIN), CONSISTENT WITH SECONDARY HEMOCHROMATOSIS         NOTE: THIS LIKELY REPRESENTS A MARKEDLY SCLEROTIC HEMANGIOMA  OR CHANGES SECONDARY TO INJURY         Final Diagnosis  A  Liver, lesion:  - Mild steatosis  - Sinusoidal dilatation with perisinusoidal fibrosis  - Negative for metastatic carcinoma  Note: The cores are thin, containing portions of only 4 portal tracts  At least 10 portal areas are needed for adequate evaluation of  fibrosis and general portal characteristics  In addition to mild steatosis, focal non-necrotizing granulomas is present  In this biopsy, there  is no significant duct-centric inflammation, florid duct lesions, duct loss, cholestasis seen   The current findings are nonspecific and the  differential diagnosis includes autoimmune related injury, infectious " etiology, medication, outflow obstruction among others  Clinical and  laboratory (Including AMA) correlation is suggested for definitive diagnosis  Imaging and other studies: I have personally reviewed pertinent reports  and I have personally reviewed pertinent films in PACS  CT C/A/P 3/18/2023 - RUL interstitial opacities with some nodular features and left basilar atelectasis, no sig mediastinal adenopathy, ureteral stents, hypoattenuation inferior right hepatic lobe    CT Abd/Pelvis 2022 - decreased right hydronephrosis, stable 1cm right adrenal nodules, bilateral ureteral stents, basilar atelectasis without evidence of miliary pattern     CT C/A/P [facility] 10/25/2022 - LUNGS: The trachea and central bronchi appear patent  Interval improvement in previously seen diffuse nodular opacities throughout both lungs  Stable subpleural opacity peripheral left lung base  A 1 2 cm pleural-based opacity inferior lingula probably representing subsegmental atelectatic changes          Pulmonary function testin2023 - Ratio 82%, FVC 96%, FEV1 93%, TLC 83%, RV 64%, DLCO 74% - normal spirometry, normal TLC, normal diffusion     3/7/2023 - Ratio 86%, FVC 2 78L (87%), FEV1 2 38L (95%) - normal spirometry     EKG, Pathology, and Other Studies: I have personally reviewed pertinent reports  TTE [facility] 10/2022 - EF 55%, mild MVR, normal RV size/funciton     Myocardial perfusion scan [facility] 10/2022 - normal myocardial perfusion study        Dandre Hopson DO, [facility]

## 2023-06-06 LAB — ACE SERPL-CCNC: 59 U/L (ref 14–82)

## 2023-06-08 DIAGNOSIS — E87.6 HYPOKALEMIA: ICD-10-CM

## 2023-06-08 RX ORDER — POTASSIUM CHLORIDE 750 MG/1
40 CAPSULE, EXTENDED RELEASE ORAL 2 TIMES DAILY
Qty: 240 CAPSULE | Refills: 1 | Status: SHIPPED | OUTPATIENT
Start: 2023-06-08 | End: 2023-07-08

## 2023-06-14 ENCOUNTER — PATIENT MESSAGE (OUTPATIENT)
Dept: GASTROENTEROLOGY | Facility: AMBULARY SURGERY CENTER | Age: 58
End: 2023-06-14

## 2023-06-14 DIAGNOSIS — D86.89 HEPATIC GRANULOMA ASSOCIATED WITH SARCOIDOSIS: Primary | ICD-10-CM

## 2023-06-15 ENCOUNTER — HOSPITAL ENCOUNTER (OUTPATIENT)
Dept: RADIOLOGY | Facility: HOSPITAL | Age: 58
Discharge: HOME | End: 2023-06-15
Attending: COLON & RECTAL SURGERY
Payer: COMMERCIAL

## 2023-06-15 DIAGNOSIS — C18.7 MALIGNANT NEOPLASM OF SIGMOID COLON (HCC): ICD-10-CM

## 2023-06-15 PROCEDURE — 74177 CT ABD & PELVIS W/CONTRAST: CPT

## 2023-06-15 PROCEDURE — 71260 CT THORAX DX C+: CPT

## 2023-06-15 PROCEDURE — G1004 CDSM NDSC: HCPCS

## 2023-06-15 RX ADMIN — IOHEXOL 100 ML: 350 INJECTION, SOLUTION INTRAVENOUS at 12:08

## 2023-06-15 NOTE — PROGRESS NOTES
Colon and Rectal Surgery   Kirstie Matthews 56 y o  female MRN: [id number]   Encounter: [id number]  06/15/23   3:37 PM        ASSESSMENT:        PLAN:        HPI  Kirstie Matthews is a 56 y o  female who is here today for surgical discussion  Last seen in the office on 4/4/2023 for malignant neoplasm of sigmoid colon  Flexible sigmoidoscopy on 4/7/2023 showed: Single malignant-appearing and multilobular mass (not traversable) in the mid rectum  Known adenocarcinoma, middle to upper rectum, none traversable, fungating lesion, prior distal tattoo noted  Full colonoscopy was recommended preoperative to resection or at the one year nadia  Patient has a personal history of rectal cancer T3 N0 M0  She is status post laparoscopic diverting colostomy with liver biopsy with Dr Debi Burkett on 12/1/22  CT chest abdomen and pelvis on 6/15/2023 showed:     1) No definite metastatic disease in the chest abdomen or pelvis  2) Splenomegaly with areas of nodular decreased enhancement, the appearance is suggestive of sarcoidosis rather than metastatic disease especially given the lack of metastatic disease elsewhere  Visible only in retrospect, this has been present since at least March 2023 although more conspicuous on the current scan  Recommend attention on follow-up imaging versus tissue sampling percutaneously  3) Mild colonic wall thickening involving the ascending colon versus under distention not significantly changed  Recommend attention on follow-up imaging  4) Indwelling bilateral ureteral stents with decrease in bilateral hydronephrosis  The proximal portion of the right stent may be encrusted  Calculi adjacent to the distal left ureter unchanged  MRI pelvis rectal cancer staging scheduled for 7/6/2023        Lab Results   Component Value Date    CEA 1 5 04/29/2023     Lab Results   Component Value Date    HCT 34 9 06/05/2023    HGB 10 6 (L) 06/05/2023    MCV 95 06/05/2023     06/05/2023    WBC 5 61 06/05/2023     Lab Results   Component Value Date    AGAP 3 (L) 06/05/2023    ALKPHOS 466 (H) 06/05/2023     (H) 06/05/2023    AST 83 (H) 06/05/2023    BUN 24 06/05/2023    CALCIUM 9 6 06/05/2023     (H) 06/05/2023    CO2 24 06/05/2023    CREATININE 0 91 06/05/2023    EGFR 71 06/05/2023    GLUC 118 05/27/2023    GLUF 122 (H) 06/05/2023    K 3 6 06/05/2023    SODIUM 137 06/05/2023    TBILI 0 94 06/05/2023    TP 7 0 06/05/2023     Historical Information   Past Medical History:   Diagnosis Date   • Cancer [facility])    • Colon cancer ([facility] [address] )    • Fall    • Headache    • Hemochromatosis, unspecified    • History of transfusion     2022 - no adverse reaction   • Kidney calculi    • Kidney stone    • Liver disease     hemangioma   • Muscle weakness    • Personal history of COVID-19 12/2022   • Sarcoidosis    • Seizures ([facility] [address] )     2022     Past Surgical History:   Procedure Laterality Date   • ABSCESS DRAINAGE      left breast   • CHEST TUBE INSERTION     • COLON SURGERY      colostomy   • COLONOSCOPY     • FL GUIDED CENTRAL VENOUS ACCESS DEVICE INSERTION  3/21/2023   • FL RETROGRADE PYELOGRAM  1/23/2023   • FL RETROGRADE PYELOGRAM  4/3/2023   • IR BIOPSY LIVER MASS  5/9/2023   • LUNG BIOPSY     • MA CYSTO/URETERO W/LITHOTRIPSY &INDWELL STENT INSRT Bilateral 1/23/2023    Procedure: CYSTOSCOPY  RIGHT URETEROSCOPY WITH LITHOTRIPSY HOLMIUM LASER, BILATERAL RETROGRADE PYELOGRAM AND BILATERAL  URETERAL STENT INSERTION;  Surgeon: Faizan Gates MD;  [facility];  Service: Urology   • MA CYSTO/URETERO W/LITHOTRIPSY &INDWELL STENT INSRT Right 4/3/2023    Procedure: RIGHT URETEROSCOPY WITH LITHOTRIPSY HOLMIUM LASER, RETROGRADE PYELOGRAM; BILATERAL EXCHANGE STENT URETERAL;  Surgeon: Faizan Gates MD;  [facility];  Service: Urology   • MA INSJ TUNNELED CTR VAD W/SUBQ PORT AGE 5 YR/> N/A 3/21/2023    Procedure: INSERTION VENOUS PORT ( PORT-A-CATH) IR;  Surgeon: Waleska Zhao DO;  [facility];  Service: Interventional Radiology   • TONSILLECTOMY     • URINARY SURGERY Bilateral     bilateral stents       Meds/Allergies       Current Outpatient Medications:   •  acetaminophen (TYLENOL) 500 mg tablet, Take 2 tablets (1,000 mg total) by mouth 3 (three) times a day, Disp: 180 tablet, Rfl: 2  •  amLODIPine (NORVASC) 5 mg tablet, Take 5 mg by mouth daily (Patient not taking: Reported on 3/13/2023), Disp: , Rfl:   •  CAPECITABINE PO, Take 1,650 mg by mouth 2 (two) times a day (Patient not taking: Reported on 3/17/2023), Disp: , Rfl:   •  cyanocobalamin (VITAMIN B-12) 1000 MCG tablet, Take 1,000 mcg by mouth daily, Disp: , Rfl:   •  cyclobenzaprine (FLEXERIL) 5 mg tablet, Take 1 tablet (5 mg total) by mouth 3 (three) times a day as needed for muscle spasms, Disp: 90 tablet, Rfl: 2  •  docusate sodium (COLACE) 100 mg capsule, Take 1 capsule (100 mg total) by mouth 2 (two) times a day for 15 days, Disp: 30 capsule, Rfl: 0  •  fluorouracil (ADRUCIL) 5 GM/100ML SOLN, , Disp: , Rfl:   •  oxybutynin (DITROPAN) 5 mg tablet, TAKE 1 TABLET (5 MG TOTAL) BY MOUTH EVERY 6 (SIX) HOURS AS NEEDED (BLADDER SPASMS), Disp: 30 tablet, Rfl: 0  •  oxyCODONE (Roxicodone) 5 immediate release tablet, Take 1-2 tablets (5-10 mg total) by mouth every 4 (four) hours as needed for moderate pain or severe pain Max Daily Amount: 60 mg, Disp: 180 tablet, Rfl: 0  •  potassium chloride (MICRO-K) 10 MEQ CR capsule, TAKE 4 CAPSULES (40 MEQ TOTAL) BY MOUTH 2 (TWO) TIMES A DAY, Disp: 240 capsule, Rfl: 1  •  Potassium Gluconate 550 MG TABS, Take 1 tablet by mouth in the morning (Patient not taking: Reported on 6/5/2023), Disp: , Rfl:   •  predniSONE 10 mg tablet, TAKE 2 TABLETS BY MOUTH EVERY DAY, Disp: 60 tablet, Rfl: 1  •  tamsulosin (FLOMAX) 0 4 mg, Take 1 capsule (0 4 mg total) by mouth daily with dinner (Patient not taking: Reported on 4/6/2023), Disp: 15 capsule, Rfl: 0  No current facility-administered medications for this visit  Allergies   Allergen Reactions   • Oxaliplatin Shortness Of Breath     Reactions occurred with 2nd and 3rd infusions (about 1-3 hours from initiation of infusion) and required treatment with steroids and antihistamines  Please refer to allergy note on 2/7/2023 for detailed description of her reactions  Social History   Social History     Substance and Sexual Activity   Alcohol Use Never     Social History     Substance and Sexual Activity   Drug Use Never     Social History     Tobacco Use   Smoking Status Never   • Passive exposure: Past   Smokeless Tobacco Never         Family History:   Family History   Problem Relation Age of Onset   • Cancer Mother    • Cirrhosis Father        Review of Systems    Objective     Current Vitals: There were no vitals filed for this visit        Physical Exam:  General:no distress  Eyes:perrla/eomi  ENT:moist mucus membranes  Neck:supple  Pulm:no increased work of breathing  CV:sinus  Abdomen:soft,nontender  Rectal:normal perianal skin/sphincter tone, no masses palpated  Extremities:no edema  Lymphatics:no neck/axillary/groin lymphadenopathy

## 2023-06-16 RX ORDER — URSODIOL 300 MG/1
900 CAPSULE ORAL DAILY
Qty: 90 CAPSULE | Refills: 11 | Status: SHIPPED | OUTPATIENT
Start: 2023-06-16 | End: 2024-06-15

## 2023-06-16 RX ORDER — PREDNISONE 10 MG/1
40 TABLET ORAL DAILY
Qty: 500 TABLET | Refills: 0 | Status: SHIPPED | OUTPATIENT
Start: 2023-06-16

## 2023-06-16 RX ORDER — PREDNISONE 10 MG/1
40 TABLET ORAL DAILY
Qty: 500 TABLET | Refills: 0 | Status: SHIPPED | OUTPATIENT
Start: 2023-06-16 | End: 2023-06-16

## 2023-06-20 ENCOUNTER — TELEPHONE (OUTPATIENT)
Age: 58
End: 2023-06-20

## 2023-06-20 ENCOUNTER — APPOINTMENT (OUTPATIENT)
Dept: LAB | Facility: AMBULARY SURGERY CENTER | Age: 58
End: 2023-06-20
Payer: COMMERCIAL

## 2023-06-20 ENCOUNTER — OFFICE VISIT (OUTPATIENT)
Age: 58
End: 2023-06-20
Payer: COMMERCIAL

## 2023-06-20 VITALS — WEIGHT: 146 LBS | BODY MASS INDEX: 26.87 KG/M2 | HEIGHT: 62 IN

## 2023-06-20 DIAGNOSIS — K76.9 LIVER LESION: ICD-10-CM

## 2023-06-20 DIAGNOSIS — C18.7 MALIGNANT NEOPLASM OF SIGMOID COLON (HCC): ICD-10-CM

## 2023-06-20 DIAGNOSIS — C20 RECTAL CANCER (HCC): Primary | ICD-10-CM

## 2023-06-20 DIAGNOSIS — R74.8 ELEVATED LIVER ENZYMES: ICD-10-CM

## 2023-06-20 DIAGNOSIS — C18.7 MALIGNANT NEOPLASM OF SIGMOID COLON (HCC): Primary | ICD-10-CM

## 2023-06-20 LAB
ALBUMIN SERPL BCP-MCNC: 3.4 G/DL (ref 3.5–5)
ALP SERPL-CCNC: 395 U/L (ref 46–116)
ALT SERPL W P-5'-P-CCNC: 119 U/L (ref 12–78)
ANION GAP SERPL CALCULATED.3IONS-SCNC: 4 MMOL/L
AST SERPL W P-5'-P-CCNC: 53 U/L (ref 5–45)
BASOPHILS # BLD AUTO: 0.01 THOUSANDS/ÂΜL (ref 0–0.1)
BASOPHILS NFR BLD AUTO: 0 % (ref 0–1)
BILIRUB SERPL-MCNC: 0.9 MG/DL (ref 0.2–1)
BUN SERPL-MCNC: 32 MG/DL (ref 5–25)
CALCIUM ALBUM COR SERPL-MCNC: 10.4 MG/DL (ref 8.3–10.1)
CALCIUM SERPL-MCNC: 9.9 MG/DL (ref 8.3–10.1)
CHLORIDE SERPL-SCNC: 110 MMOL/L (ref 96–108)
CO2 SERPL-SCNC: 26 MMOL/L (ref 21–32)
CREAT SERPL-MCNC: 0.9 MG/DL (ref 0.6–1.3)
CRP SERPL QL: 5.1 MG/L
EOSINOPHIL # BLD AUTO: 0 THOUSAND/ÂΜL (ref 0–0.61)
EOSINOPHIL NFR BLD AUTO: 0 % (ref 0–6)
ERYTHROCYTE [DISTWIDTH] IN BLOOD BY AUTOMATED COUNT: 19.7 % (ref 11.6–15.1)
ERYTHROCYTE [SEDIMENTATION RATE] IN BLOOD: 48 MM/HOUR (ref 0–29)
GFR SERPL CREATININE-BSD FRML MDRD: 72 ML/MIN/1.73SQ M
GLUCOSE SERPL-MCNC: 138 MG/DL (ref 65–140)
HCT VFR BLD AUTO: 38.3 % (ref 34.8–46.1)
HGB BLD-MCNC: 12.3 G/DL (ref 11.5–15.4)
IGA SERPL-MCNC: 193 MG/DL (ref 70–400)
IGG SERPL-MCNC: 862 MG/DL (ref 700–1600)
IGM SERPL-MCNC: 39 MG/DL (ref 40–230)
IMM GRANULOCYTES # BLD AUTO: 0.05 THOUSAND/UL (ref 0–0.2)
IMM GRANULOCYTES NFR BLD AUTO: 1 % (ref 0–2)
INR PPP: 0.9 (ref 0.84–1.19)
IRON SATN MFR SERPL: 11 % (ref 15–50)
IRON SERPL-MCNC: 50 UG/DL (ref 50–170)
LYMPHOCYTES # BLD AUTO: 0.22 THOUSANDS/ÂΜL (ref 0.6–4.47)
LYMPHOCYTES NFR BLD AUTO: 3 % (ref 14–44)
MCH RBC QN AUTO: 29.6 PG (ref 26.8–34.3)
MCHC RBC AUTO-ENTMCNC: 32.1 G/DL (ref 31.4–37.4)
MCV RBC AUTO: 92 FL (ref 82–98)
MONOCYTES # BLD AUTO: 0.26 THOUSAND/ÂΜL (ref 0.17–1.22)
MONOCYTES NFR BLD AUTO: 3 % (ref 4–12)
NEUTROPHILS # BLD AUTO: 7.4 THOUSANDS/ÂΜL (ref 1.85–7.62)
NEUTS SEG NFR BLD AUTO: 93 % (ref 43–75)
NRBC BLD AUTO-RTO: 0 /100 WBCS
PLATELET # BLD AUTO: 219 THOUSANDS/UL (ref 149–390)
PMV BLD AUTO: 9.5 FL (ref 8.9–12.7)
POTASSIUM SERPL-SCNC: 3.9 MMOL/L (ref 3.5–5.3)
PROT SERPL-MCNC: 7.4 G/DL (ref 6.4–8.4)
PROTHROMBIN TIME: 12.4 SECONDS (ref 11.6–14.5)
RBC # BLD AUTO: 4.15 MILLION/UL (ref 3.81–5.12)
SODIUM SERPL-SCNC: 140 MMOL/L (ref 135–147)
TIBC SERPL-MCNC: 453 UG/DL (ref 250–450)
WBC # BLD AUTO: 7.94 THOUSAND/UL (ref 4.31–10.16)

## 2023-06-20 PROCEDURE — 86140 C-REACTIVE PROTEIN: CPT | Performed by: STUDENT IN AN ORGANIZED HEALTH CARE EDUCATION/TRAINING PROGRAM

## 2023-06-20 PROCEDURE — 82164 ANGIOTENSIN I ENZYME TEST: CPT | Performed by: STUDENT IN AN ORGANIZED HEALTH CARE EDUCATION/TRAINING PROGRAM

## 2023-06-20 PROCEDURE — 85610 PROTHROMBIN TIME: CPT | Performed by: STUDENT IN AN ORGANIZED HEALTH CARE EDUCATION/TRAINING PROGRAM

## 2023-06-20 PROCEDURE — 85025 COMPLETE CBC W/AUTO DIFF WBC: CPT | Performed by: STUDENT IN AN ORGANIZED HEALTH CARE EDUCATION/TRAINING PROGRAM

## 2023-06-20 PROCEDURE — 36415 COLL VENOUS BLD VENIPUNCTURE: CPT | Performed by: STUDENT IN AN ORGANIZED HEALTH CARE EDUCATION/TRAINING PROGRAM

## 2023-06-20 PROCEDURE — 80053 COMPREHEN METABOLIC PANEL: CPT | Performed by: STUDENT IN AN ORGANIZED HEALTH CARE EDUCATION/TRAINING PROGRAM

## 2023-06-20 PROCEDURE — 99215 OFFICE O/P EST HI 40 MIN: CPT | Performed by: COLON & RECTAL SURGERY

## 2023-06-20 PROCEDURE — 85652 RBC SED RATE AUTOMATED: CPT | Performed by: STUDENT IN AN ORGANIZED HEALTH CARE EDUCATION/TRAINING PROGRAM

## 2023-06-20 PROCEDURE — 83540 ASSAY OF IRON: CPT

## 2023-06-20 PROCEDURE — 82784 ASSAY IGA/IGD/IGG/IGM EACH: CPT

## 2023-06-20 PROCEDURE — 83550 IRON BINDING TEST: CPT

## 2023-06-20 NOTE — PROGRESS NOTES
Colon and Rectal Surgery   Abimael Harvey 56 y o  female MRN: [id number]   Encounter: [id number]  06/23/23   4:29 PM        ASSESSMENT:    Lamin Jones returns today in surgical discussion after neoadjuvant chemoradiation for middle rectal cancer, she completed this 5/25/23  We discussed Robotic, possible laparoscopic versus open low anterior resection, this will include colostomy reversal, she understands likely need for diverting loop ileostomy postradiation  We did discuss a one-step procedure keeping colostomy however she wanted attempts at reconstruction  We did discuss with her today that she is at multiple high risks giving her sarcoid, and liver disease, in a face-to-face, personal, informed consent process, the benefits, alternatives,   risks including not limited to bleeding, infection, risks of anesthesia, open surgery, DVT/PE, heart attack, stroke, death, damage to local structures(e g  ureter, duodenum),anastomotic leak requiring reoperation, temporary versus permanent stoma  They understood these risks, signed informed consent, and wish to proceed  PLAN:   Robotic, possible laparoscopic versus open low anterior resection to be scheduled, day prior colonoscopy  Type/Screen, A1c added today  Ureteral Stent exchange upcoming  CC  Dr Hailey Barreto, PAAissatouC  Dr Paramjit Pabon, RN  Dariusz Jack  GI Tumor Conference      HPI  Abimael Harvey is a 56 y o  female who is here today for surgical discussion  The patient notes she finished chemoradiation 2 weeks ago; notes rectal pain and rectal bleeding with bowel movements  She notes daily output from ostomy  Last seen in the office on 4/4/2023 for malignant neoplasm of sigmoid colon  Flexible sigmoidoscopy on 4/7/2023 showed: Single malignant-appearing and multilobular mass (not traversable) in the mid rectum   Known adenocarcinoma, middle to upper rectum, none traversable, fungating lesion, prior distal tattoo noted  Full colonoscopy was recommended preoperative to resection or at the one year nadia  Patient has a personal history of rectal cancer T3 N0 M0  She is status post laparoscopic diverting colostomy with liver biopsy with Dr Rancho Rome on 12/1/22  CT chest abdomen and pelvis on 6/15/2023 showed:     1) No definite metastatic disease in the chest abdomen or pelvis  2) Splenomegaly with areas of nodular decreased enhancement, the appearance is suggestive of sarcoidosis rather than metastatic disease especially given the lack of metastatic disease elsewhere  Visible only in retrospect, this has been present since at least March 2023 although more conspicuous on the current scan  Recommend attention on follow-up imaging versus tissue sampling percutaneously  3) Mild colonic wall thickening involving the ascending colon versus under distention not significantly changed  Recommend attention on follow-up imaging  4) Indwelling bilateral ureteral stents with decrease in bilateral hydronephrosis  The proximal portion of the right stent may be encrusted  Calculi adjacent to the distal left ureter unchanged  MRI pelvis rectal cancer staging scheduled for 7/6/2023      Lab Results   Component Value Date    CEA 1 5 04/29/2023     Lab Results   Component Value Date    HCT 34 9 06/05/2023    HGB 10 6 (L) 06/05/2023    MCV 95 06/05/2023     06/05/2023    WBC 5 61 06/05/2023     Lab Results   Component Value Date    SODIUM 140 06/20/2023    K 3 9 06/20/2023     (H) 06/20/2023    CO2 26 06/20/2023    AGAP 4 06/20/2023    BUN 32 (H) 06/20/2023    CREATININE 0 90 06/20/2023    GLUC 138 06/20/2023    GLUF 122 (H) 06/05/2023    CALCIUM 9 9 06/20/2023    AST 53 (H) 06/20/2023     (H) 06/20/2023    ALKPHOS 395 (H) 06/20/2023    TP 7 4 06/20/2023    TBILI 0 90 06/20/2023    EGFR 72 06/20/2023     Historical Information   Past Medical History:   Diagnosis Date   • Cancer ([facility] [address] )    • Colon cancer [facility])    • Fall    • Headache    • Hemochromatosis, unspecified    • History of transfusion     2022 - no adverse reaction   • Kidney calculi    • Kidney stone    • Liver disease     hemangioma   • Muscle weakness    • Personal history of COVID-19 12/2022   • Sarcoidosis    • Seizures ([address] )     2022     Past Surgical History:   Procedure Laterality Date   • ABSCESS DRAINAGE      left breast   • CHEST TUBE INSERTION     • COLON SURGERY      colostomy   • COLONOSCOPY     • FL GUIDED CENTRAL VENOUS ACCESS DEVICE INSERTION  3/21/2023   • FL RETROGRADE PYELOGRAM  1/23/2023   • FL RETROGRADE PYELOGRAM  4/3/2023   • IR BIOPSY LIVER MASS  5/9/2023   • LUNG BIOPSY     • VA CYSTO/URETERO W/LITHOTRIPSY &INDWELL STENT INSRT Bilateral 1/23/2023    Procedure: CYSTOSCOPY  RIGHT URETEROSCOPY WITH LITHOTRIPSY HOLMIUM LASER, BILATERAL RETROGRADE PYELOGRAM AND BILATERAL  URETERAL STENT INSERTION;  Surgeon: All Segundo MD;  [facility];  Service: Urology   • VA CYSTO/URETERO W/LITHOTRIPSY &INDWELL STENT INSRT Right 4/3/2023    Procedure: RIGHT URETEROSCOPY WITH LITHOTRIPSY HOLMIUM LASER, RETROGRADE PYELOGRAM; BILATERAL EXCHANGE STENT URETERAL;  Surgeon: All Segundo MD;  [facility];  Service: Urology   • VA INSJ TUNNELED CTR VAD W/SUBQ PORT AGE 5 YR/> N/A 3/21/2023    Procedure: INSERTION VENOUS PORT ( PORT-A-CATH) IR;  Surgeon: Victor Goodell, DO;  [facility];  Service: Interventional Radiology   • TONSILLECTOMY     • URINARY SURGERY Bilateral     bilateral stents     Meds/Allergies     Current Outpatient Medications:   •  [START ON 7/1/2023] metroNIDAZOLE (FLAGYL) 500 mg tablet, 1 PM AND 10PM Do not start before July 1, 2023 , Disp: 2 tablet, Rfl: 0  •  [START ON 7/1/2023] neomycin (MYCIFRADIN) 500 mg tablet, 1pm and 10pm Do not start before July 1, 2023 , Disp: 2 tablet, Rfl: 0  •  predniSONE 10 mg tablet, TAKE 2 TABLETS BY MOUTH EVERY DAY (Patient not taking: Reported on 6/21/2023), Disp: 60 tablet, Rfl: 1  •  acetaminophen (TYLENOL) 500 mg tablet, Take 2 tablets (1,000 mg total) by mouth 3 (three) times a day, Disp: 180 tablet, Rfl: 2  •  amLODIPine (NORVASC) 5 mg tablet, Take 5 mg by mouth daily (Patient not taking: Reported on 3/13/2023), Disp: , Rfl:   •  CAPECITABINE PO, Take 1,650 mg by mouth 2 (two) times a day (Patient not taking: Reported on 3/17/2023), Disp: , Rfl:   •  cyanocobalamin (VITAMIN B-12) 1000 MCG tablet, Take 1,000 mcg by mouth daily, Disp: , Rfl:   •  cyclobenzaprine (FLEXERIL) 5 mg tablet, Take 1 tablet (5 mg total) by mouth 3 (three) times a day as needed for muscle spasms (Patient not taking: Reported on 6/21/2023), Disp: 90 tablet, Rfl: 2  •  docusate sodium (COLACE) 100 mg capsule, Take 1 capsule (100 mg total) by mouth 2 (two) times a day for 15 days, Disp: 30 capsule, Rfl: 0  •  fluorouracil (ADRUCIL) 5 GM/100ML SOLN, , Disp: , Rfl:   •  oxybutynin (DITROPAN) 5 mg tablet, TAKE 1 TABLET (5 MG TOTAL) BY MOUTH EVERY 6 (SIX) HOURS AS NEEDED (BLADDER SPASMS) (Patient not taking: Reported on 6/21/2023), Disp: 30 tablet, Rfl: 0  •  oxyCODONE (Roxicodone) 5 immediate release tablet, Take 1-2 tablets (5-10 mg total) by mouth every 4 (four) hours as needed for moderate pain or severe pain Max Daily Amount: 60 mg, Disp: 180 tablet, Rfl: 0  •  potassium chloride (MICRO-K) 10 MEQ CR capsule, TAKE 4 CAPSULES (40 MEQ TOTAL) BY MOUTH 2 (TWO) TIMES A DAY, Disp: 240 capsule, Rfl: 1  •  Potassium Gluconate 550 MG TABS, Take 1 tablet by mouth in the morning (Patient not taking: Reported on 6/5/2023), Disp: , Rfl:   •  predniSONE 10 mg tablet, Take 4 tablets (40 mg total) by mouth daily Please take for 4 weeks and then decrease by 5 mg increments every 2 weeks  , Disp: 500 tablet, Rfl: 0  •  tamsulosin (FLOMAX) 0 4 mg, Take 1 capsule (0 4 mg total) by mouth daily with dinner (Patient not taking: Reported on 4/6/2023), Disp: 15 capsule, Rfl: 0  •  ursodiol (ACTIGALL) 300 "mg capsule, Take 3 capsules (900 mg total) by mouth in the morning, Disp: 90 capsule, Rfl: 11      Allergies   Allergen Reactions   • Oxaliplatin Shortness Of Breath     Reactions occurred with 2nd and 3rd infusions (about 1-3 hours from initiation of infusion) and required treatment with steroids and antihistamines  Please refer to allergy note on 2/7/2023 for detailed description of her reactions       Social History   Social History     Substance and Sexual Activity   Alcohol Use Never     Social History     Substance and Sexual Activity   Drug Use Never     Social History     Tobacco Use   Smoking Status Never   • Passive exposure: Past   Smokeless Tobacco Never         Family History:   Family History   Problem Relation Age of Onset   • Cancer Mother    • Cirrhosis Father      Review of Systems    Objective   Current Vitals:   Vitals:    06/20/23 1403   Weight: 66 2 kg (146 lb)   Height: 5' 2\" (1 575 m)     Physical Exam:  General:no distress  ENT:moist mucus membranes  Neck:supple  Pulm:no increased work of breathing, clear bilateral  CV:sinus  Abdomen:soft,nontender  Extremities:no edema        "

## 2023-06-20 NOTE — TELEPHONE ENCOUNTER
Patient scheduled for surgery  8/17/23  at [facility]  Instructions and PAT's gone over with and handed to the patient      abx  bloodwork   Surgical bag- received

## 2023-06-20 NOTE — LETTER
[address] [address]        6/20/2023    Dear Jocy Manzo,    Your surgery is scheduled for: August 17, 2023    The hospital will call the evening prior to your surgery with your expected arrival time  Location:[facility]  [address] , [place] [address] [place] [id number]    CHECK LIST PRIOR TO INPATIENT SURGERY  It is your responsibility to obtain any/all referrals needed for your surgery if required by your insurance  Our office will contact you to discuss your insurance coverage for this procedure  Special instructions required if you are taking any blood thinners  Please verify with the prescribing physician  Examples include Coumadin, Plavix, Xarelto, Eliquis, Pradaxa, etc     Please check with your family physician if you are taking the following medications: Aspirin or any Aspirin containing medication, Gingko biloba, Ginseng, Feverfew, and/or St  Al’s Wort  We suggest stopping these for 3 days  The night before and the day of your surgery, wash from your neck to groin with chlorhexidine soap  This soap is available at most retail pharmacies under such brand names as Hibiclens, Endure or Aplicare  Pre-admission testing Required: YES X     If Yes, what type:  BLOOD-WORK  X     BOWEL PREPARATION REQUIRED: YES X  If yes, start date: 8/16/23  Please see attached bowel preparation instructions  NOTHING TO EAT OR DRINK AFTER MIDNIGHT PRIOR TO SURGERY  Please do not hesitate to call our office with any questions regarding your surgery  MIRALAX/GATORADE SURGERY PREPARATION INSTRUCTIONS    Purchase:   (1) 238g bottle of MiraLax or Glycolax - no prescription needed   4 Dulcolax Laxative Tablets - no prescription needed   64 oz  bottle of Gatorade (NOT RED), Crystal Light, or another clear liquid of  choice  **REMEMBER** A prescription for antibiotics has been called into your pharmacy for  prior to your surgery      One Day Prior to Surgery  Have a normal breakfast, light lunch, and clear liquids thereafter  It is important that you drink plenty of clear liquids throughout the day to prevent dehydration  CLEAR LIQUIDS INCLUDE:  Water, Clear Fruit Juice (Apple, Cranberry, Grape), Black Coffee, Tea, Ginger Ale, Gatorade, Reed-Aid, Hi-C, plain Jello, Ice Pops, Clear Broth or Bouillon, Crystal Light, Lemonade, Soda (regular or diet)  No Milk Products! At 1:00 pm, take (4) Dulcolax Tablets with 8 oz  of water  Swallow the tablets whole with a full glass of water  The package may direct you not to exceed (2) tablets at any time but for the purpose of this examination, you should take (4)  At 1:00 pm, take your first dose of antibiotic as prescribed  At 1:00 pm, Mix the 238g bottle of MiraLax in 64 oz  of Gatorade and shake the solution until the MiraLax is dissolved  Drink 8 oz  glassfuls at your own pace  It may take 3-4 hours to drink all the solution  At 10:00 pm, take your last dose of antibiotic as prescribed  REMEMBER TO STAY CLOSE TO TOILET FACILITIES  The Day of Surgery  Take nothing by mouth until after surgery

## 2023-06-21 ENCOUNTER — DOCUMENTATION (OUTPATIENT)
Dept: HEMATOLOGY ONCOLOGY | Facility: CLINIC | Age: 58
End: 2023-06-21

## 2023-06-21 ENCOUNTER — TELEPHONE (OUTPATIENT)
Dept: INTERNAL MEDICINE CLINIC | Facility: CLINIC | Age: 58
End: 2023-06-21

## 2023-06-21 ENCOUNTER — OFFICE VISIT (OUTPATIENT)
Dept: PALLIATIVE MEDICINE | Facility: CLINIC | Age: 58
End: 2023-06-21

## 2023-06-21 VITALS
TEMPERATURE: 97.4 F | DIASTOLIC BLOOD PRESSURE: 90 MMHG | HEIGHT: 62 IN | BODY MASS INDEX: 26.87 KG/M2 | WEIGHT: 146 LBS | SYSTOLIC BLOOD PRESSURE: 180 MMHG | OXYGEN SATURATION: 98 % | HEART RATE: 88 BPM

## 2023-06-21 DIAGNOSIS — I10 PRIMARY HYPERTENSION: ICD-10-CM

## 2023-06-21 DIAGNOSIS — G89.3 CANCER RELATED PAIN: ICD-10-CM

## 2023-06-21 DIAGNOSIS — K59.03 DRUG-INDUCED CONSTIPATION: ICD-10-CM

## 2023-06-21 DIAGNOSIS — C18.7 MALIGNANT NEOPLASM OF SIGMOID COLON (HCC): Primary | ICD-10-CM

## 2023-06-21 DIAGNOSIS — R31.0 GROSS HEMATURIA: ICD-10-CM

## 2023-06-21 DIAGNOSIS — D86.9 SARCOIDOSIS: ICD-10-CM

## 2023-06-21 DIAGNOSIS — Z51.5 PALLIATIVE CARE PATIENT: ICD-10-CM

## 2023-06-21 DIAGNOSIS — K52.1 DRUG-INDUCED DIARRHEA: ICD-10-CM

## 2023-06-21 DIAGNOSIS — Z71.89 ADVANCED CARE PLANNING/COUNSELING DISCUSSION: ICD-10-CM

## 2023-06-21 LAB — ACE SERPL-CCNC: 75 U/L (ref 14–82)

## 2023-06-21 NOTE — TELEPHONE ENCOUNTER
----- Message from Monalisa Macedo MD sent at 6/21/2023 11:13 AM EDT -----  Please consider discussing hypertension w/ patient - many elevated readings in past 6 months, not all related to pain  For instance, 0/10 pain today but /90  Thanks

## 2023-06-21 NOTE — PROGRESS NOTES
Follow-up with Palliative and Supportive Care  Sabrina Bowden 56 y o  female [id number]    ASSESSMENT & PLAN:  1  Malignant neoplasm of sigmoid colon ([address] )    2  Sarcoidosis    3  Cancer related pain    4  Gross hematuria    5  Primary hypertension    6  Drug-induced constipation    7  Drug-induced diarrhea    8  Advanced care planning/counseling discussion    9  Palliative care patient          • Continue disease-directed cares  • Patient c/o recurring severe debilitating abdominal pain and gross hematuria  I sent an urgent message to Urology requesting assistance (given b/l ureteral stents)  • Patient demonstrates a pattern of hypertension even when pain is well-controlled  Pain is 0/10 at time of visit today as she took 10mg oxyIR prior, but BP was 180/90 at start of visit  Sent a message to patient's PCP  • ACP: Patient has not completed any advanced healthcare directives  Advanced directive counseling given  She was provided a copy of the [facility] Advanced Directive along with counseling prior visit  Reviewed with patient  ACP may be further reviewed next visit  She had expressed a desire to have her ex- Domingo Schaumann be listed as a surrogate healthcare decision-maker  • For cancer-related pain (and also some possible more acute pain d/t ureteral stent issues), which can be severe and debilitating  o Recommend using prescribed Flexeril PRN muscle spasm component  o Continue oxyIR to 5-10mg q4h PRN moderate-severe pain  10mg doses seem to be effective for her severe spikes of pain  Encouraged more frequent use, and to use when pain is rising / before it reaches 10/10 level   o Recommend Tylenol 1000mg TID ATC  • Counseled on bowel regimen for drug-induced diarrhea, drug-induced constipation  • Continue other medications  • No refills needed today    • Reviewed notes (SW, PCP, Pulmonology, Colorectal Surgery), labs (6/5/23 Cl 110, Cr 0 91, cCa 10 2, AST 83, , , alb 3 2, Hb 10 6; 5/27/23 K 3 4, Cr 0 88, tProt 6 0, , Hb 9 8), imaging + procedures (6/15/23 [facility])  Return in about 1 month (around 7/21/2023)  • Emotional support provided  • Medication safety issues addressed - no driving under the influence of narcotics (including opioids), watch for adverse effects including AMS or respiratory depression (slowed breathing), keep medications stored in a safe/locked environment, do not use alcohol while opioids or other narcotics are in one's system  Requested Prescriptions      No prescriptions requested or ordered in this encounter       There are no discontinued medications  Representatives have queried the patient's controlled substance dispensing history in the Prescription Drug Monitoring Program in compliance with regulations before I have prescribed any controlled substances  The prescription history is consistent with prescribed therapy and our practice policies  30+ minutes were spent in this ambulatory visit with greater than 50% of the time spent face to face with patient and her ex- in counseling or coordination of care including symptom assessment and management, medication review, psychosocial support, chart review, imaging review, lab review, advanced directives, goals of care, supportive listening and anticipatory guidance  All of the patient's questions were answered during this discussion  SUBJECTIVE:  Chief Complaint   Patient presents with   • Follow-up   • Abdominal Pain   • Cancer   • Hypertension   • Counseling   • Diarrhea   • Constipation        HPI    Chandler Tai is a 56 y o  female w/ rectosigmoid cancer s/p laparoscopic diverting colostomy in 12/2022 and s/p neoadjuvant chemoXRT w/ plan for surgery in coming months; chemo-induced diarrhea, biopsy-proven pulmonary sarcoidosis on steroids, nephrolithiasis w/ urinary obstruction s/p bilateral ureteral stenting   She follows w/ Dr Mauricio Jones (Radiation Oncology), Dr Angie Kang (Gastroenterology), Dr Villagran Prior (Medical Oncology)  Patient is known to [facility] [place] [facility]; seen in consult 5/24/23 for symptom assessment and management, medication review, medication adjustment, psychosocial support, chart review, imaging review, lab review, advanced directives, goals of care, opioid titration, supportive listening and anticipatory guidance  Patient reports ongoing spikes of pain which can be severe; most recently today AM, 7/10 located in her abdomen  She states the pain is most usually associated w/ gross hematuria, which gain is intermittent  She has chronic cancer-related rectal pain but this pain is different  Thankfully 10mg doses of oxycodone (when taken) is helpful for her pain and after taking that today AM her pain improved to 0/10  Pain can resolve with time, in some instances  She generally reaches for Tylenol, only taking oxyIR occasionally when pain is very severe  She has been counseled / encouraged to use oxyIR when pain is rising or before it is severe, but has some concerns about using opioids regularly  Sleep can be fragmented d/t pain  Patient reports her appetite has been notably increased recently, as she is on steroids for her sarcoidosis  She denies recent N/V  She has OIC at times, drug-induced diarrhea at other times  Review of patient's BP readings over the past 6 months reveals many instances of hypertension, which may not all be related to elevated pain:      PDMP shows no concerns  The following portions of the medical history were reviewed: past medical history, surgical history, problem list, medication list, family history, and social history        Current Outpatient Medications:   •  acetaminophen (TYLENOL) 500 mg tablet, Take 2 tablets (1,000 mg total) by mouth 3 (three) times a day, Disp: 180 tablet, Rfl: 2  •  cyanocobalamin (VITAMIN B-12) 1000 MCG tablet, Take 1,000 mcg by mouth daily, Disp: , Rfl:   •  oxyCODONE (Roxicodone) 5 immediate release tablet, Take 1-2 tablets (5-10 mg total) by mouth every 4 (four) hours as needed for moderate pain or severe pain Max Daily Amount: 60 mg, Disp: 180 tablet, Rfl: 0  •  potassium chloride (MICRO-K) 10 MEQ CR capsule, TAKE 4 CAPSULES (40 MEQ TOTAL) BY MOUTH 2 (TWO) TIMES A DAY, Disp: 240 capsule, Rfl: 1  •  predniSONE 10 mg tablet, Take 4 tablets (40 mg total) by mouth daily Please take for 4 weeks and then decrease by 5 mg increments every 2 weeks  , Disp: 500 tablet, Rfl: 0  •  ursodiol (ACTIGALL) 300 mg capsule, Take 3 capsules (900 mg total) by mouth in the morning, Disp: 90 capsule, Rfl: 11  •  amLODIPine (NORVASC) 5 mg tablet, Take 5 mg by mouth daily (Patient not taking: Reported on 3/13/2023), Disp: , Rfl:   •  CAPECITABINE PO, Take 1,650 mg by mouth 2 (two) times a day (Patient not taking: Reported on 3/17/2023), Disp: , Rfl:   •  cyclobenzaprine (FLEXERIL) 5 mg tablet, Take 1 tablet (5 mg total) by mouth 3 (three) times a day as needed for muscle spasms (Patient not taking: Reported on 6/21/2023), Disp: 90 tablet, Rfl: 2  •  docusate sodium (COLACE) 100 mg capsule, Take 1 capsule (100 mg total) by mouth 2 (two) times a day for 15 days, Disp: 30 capsule, Rfl: 0  •  fluorouracil (ADRUCIL) 5 GM/100ML SOLN, , Disp: , Rfl:   •  oxybutynin (DITROPAN) 5 mg tablet, TAKE 1 TABLET (5 MG TOTAL) BY MOUTH EVERY 6 (SIX) HOURS AS NEEDED (BLADDER SPASMS) (Patient not taking: Reported on 6/21/2023), Disp: 30 tablet, Rfl: 0  •  Potassium Gluconate 550 MG TABS, Take 1 tablet by mouth in the morning (Patient not taking: Reported on 6/5/2023), Disp: , Rfl:   •  predniSONE 10 mg tablet, TAKE 2 TABLETS BY MOUTH EVERY DAY (Patient not taking: Reported on 6/21/2023), Disp: 60 tablet, Rfl: 1  •  tamsulosin (FLOMAX) 0 4 mg, Take 1 capsule (0 4 mg total) by mouth daily with dinner (Patient not taking: Reported on 4/6/2023), Disp: 15 capsule, Rfl: 0    Review of Systems   Constitutional: Positive for appetite change (elevated d/t steroids)     Gastrointestinal: Positive "for abdominal pain, constipation, diarrhea and rectal pain  Negative for nausea and vomiting  Genitourinary: Positive for flank pain and hematuria  Allergic/Immunologic: Positive for immunocompromised state  Psychiatric/Behavioral: Positive for sleep disturbance (d/t pain)  The patient is nervous/anxious  All other systems reviewed and are negative  OBJECTIVE:  BP (!) 180/90 (BP Location: Left arm, Patient Position: Sitting, Cuff Size: Standard)   Pulse 88   Temp (!) 97 4 °F (36 3 °C) (Tympanic)   Ht 5' 2\" (1 575 m)   Wt 66 2 kg (146 lb)   SpO2 98%   BMI 26 70 kg/m²   Physical Exam  Vitals reviewed  Constitutional:       General: She is not in acute distress  Appearance: She is well-groomed and overweight  She is not toxic-appearing  HENT:      Head: Normocephalic and atraumatic  Right Ear: External ear normal       Left Ear: External ear normal    Eyes:      General: No scleral icterus  Right eye: No discharge  Left eye: No discharge  Extraocular Movements: Extraocular movements intact  Conjunctiva/sclera: Conjunctivae normal       Pupils: Pupils are equal, round, and reactive to light  Cardiovascular:      Rate and Rhythm: Normal rate  Pulmonary:      Effort: Pulmonary effort is normal  No tachypnea, bradypnea, accessory muscle usage or respiratory distress  Comments: Able to speak comfortably in complete sentences on room air at rest   Abdominal:      General: There is no distension  Tenderness: There is no guarding  Musculoskeletal:      Cervical back: Normal range of motion  Right lower leg: No edema  Left lower leg: No edema  Skin:     General: Skin is dry  Coloration: Skin is not pale  Neurological:      Mental Status: She is alert and oriented to person, place, and time  Cranial Nerves: No dysarthria or facial asymmetry  Gait: Gait normal       Comments: Using no assistive devices for ambulation   " "  Psychiatric:         Attention and Perception: Attention normal          Mood and Affect: Mood is anxious  Affect is not inappropriate  Speech: Speech normal          Behavior: Behavior normal  Behavior is cooperative  Thought Content: Thought content normal          Cognition and Memory: Cognition and memory normal           Evans Magallanes MD  [facility] Palliative and Supportive Care      Portions of this document may have been created using dictation software and as such some \"sound alike\" terms may have been generated by the system  Do not hesitate to contact me with any questions or clarifications     "

## 2023-06-21 NOTE — Clinical Note
Please consider discussing hypertension w/ patient - many elevated readings in past 6 months, not all related to pain  For instance, 0/10 pain today but /90  Thanks

## 2023-06-21 NOTE — Clinical Note
Patient c/o severe abdominal pain and gross hematuria  Oxycodone helps with pain, but she may have a stent issue  Can you reach out to the patient?

## 2023-06-21 NOTE — PATIENT INSTRUCTIONS
It was good to see you today  Thank you for coming in  Please call your primary care provider today to discuss your high blood pressure (180/90) and the blood in your urine  I will send Dr Julia Wang a message as well  While blood pressure can be elevated when pain is elevated, you have had many elevated BP readings in the past 6 months  Blood in the urine could be signs of a urinary tract infection or other issue that may need to be treated - or it could be related to a stent (especially as pain is related to the blood)  Consider calling Dr Jonh Rock of Urology  I encourage you to take the oxycodone more often - 5 or 10 mg up to every 4 hours as needed  Take as pain is rising, like 4 or 5 out of 10, as taking when pain is more severe may mean it may not work as well  Consider taking 10mg oxycodone at bedtime to reduce the chance you might wake from pain  A copy of the [facility] Advanced Directive was provided last visit; please review and discuss w/ your family  We can discuss it at our next visit  When complete, you may bring it in to any [facility]'s appointment where staff can witness your signature and scan it in to the system  When we use opioids for pain control, constipation is common and patients should act to prevent it:  Drink PLENTY of water  This is important to keep the gut moving  Some people have success w/ using prunes, prune juice, certain fruits or vegetables (apples, bananas, prunes, pears, raspberries, and vegetables like string beans, broccoli, spinach, kale, squash, lentils, peas, and beans), or fiber gummies  Try a probiotic  This could be yogurt or kefir, or fermented beverages such as kombucha, but probiotics are also available in capsule form  Aim for 10-15 billion colony-forming-units, w/ bacteria such as Lactobacillus / Saccharomyces / Actinomyces    Osmotic laxatives (Miralax, magnesium citrate, Milk of Magnesia) can be very useful for opioid-induced constipation (OIC); take daily to prevent "OIC   Bulk laxatives (Citrucel, Metamucil, Fibercon, Benefiber, wheat germ) are useful for constipation in patients who are not taking opioids, but are not recommended if you are taking opioids  Colace is good for softening hard stools, or preventing constipation when opioids are being used - but does not stimulate the bowel to move things along once constipation has occurred  You can use senna, 1 to 2 tabs, once or twice daily as needed for constipation  Use as directed on the box/bottle  Senna is also available in a tea (\"Smooth Move\")  Should that not be enough for your constipation, you can try Dulcolax  Should that not be enough, consider an enema  All of these medications are available over-the-counter  If diarrhea is an issue:  Try Bananatrol (banana flakes)  Use as directed / as-needed for diarrhea  This is something you may safely take every day  If you become constipated you may wish to stop using it, although it can treat both constipation and diarrhea in some patients  This available over-the-counter at some pharmacies, but you may have more success looking online ([employer])  Stay hydrated! If needed, it is okay to take Imodium for diarrhea  Use as directed, available over-the-counter  Return in about 1 month (around 7/21/2023)  Call us for refills on medications that we supply, as needed  If something changes and you need to come in sooner, please call our office  PRESCRIPTION REFILL REMINDER:  All medication refills should be requested prior to [facility] on Friday  Any refill requests after noon on Friday would be addressed the following Monday  MEDICATION SAFETY ISSUES:  Do not drive under the influence of narcotics (including opioids), watch for adverse effects including confusion / altered mental status / respiratory depression (slowed breathing), keep medications stored in a safe/locked environment, do not use alcohol while opioids or other narcotics are in your system   "

## 2023-06-21 NOTE — PROGRESS NOTES
Please reach out to pt:  I got msg from Dr Watson Serum  He is concerned of pt's blood pressure  We could do a follow up within next 2-4 weeks to check her blood pressure and adjust BP med if she is interested

## 2023-06-21 NOTE — PROGRESS NOTES
In-basket message received from Dr Mikayla Webb to add patient to [facility] on 7/13/2023  Chart reviewed and prep started  Pending MRI of pelvis scheduled on 7/6/23  I will follow up on results

## 2023-06-22 RX ORDER — HEPARIN SODIUM 5000 [USP'U]/ML
5000 INJECTION, SOLUTION INTRAVENOUS; SUBCUTANEOUS ONCE
OUTPATIENT
Start: 2023-06-22 | End: 2023-06-22

## 2023-06-22 RX ORDER — POLYETHYLENE GLYCOL 3350 17 G/17G
255 POWDER, FOR SOLUTION ORAL ONCE
OUTPATIENT
Start: 2023-06-22 | End: 2023-06-22

## 2023-06-22 RX ORDER — BISACODYL 5 MG/1
10 TABLET, DELAYED RELEASE ORAL 2 TIMES DAILY
OUTPATIENT
Start: 2023-06-22 | End: 2023-06-23

## 2023-06-22 RX ORDER — METRONIDAZOLE 500 MG/1
TABLET ORAL
Qty: 2 TABLET | Refills: 0 | Status: SHIPPED | OUTPATIENT
Start: 2023-07-01 | End: 2023-07-02

## 2023-06-22 RX ORDER — NEOMYCIN SULFATE 500 MG/1
TABLET ORAL
Qty: 2 TABLET | Refills: 0 | Status: SHIPPED | OUTPATIENT
Start: 2023-07-01 | End: 2023-07-02

## 2023-06-23 NOTE — PATIENT INSTRUCTIONS
ASSESSMENT:    Sumaya Bucio returns today in surgical discussion after neoadjuvant chemoradiation for middle rectal cancer, she completed this 5/25/23  We discussed Robotic, possible laparoscopic versus open low anterior resection, this will include colostomy reversal, she understands likely need for diverting loop ileostomy postradiation  We did discuss a one-step procedure keeping colostomy however she wanted attempts at reconstruction  We did discuss with her today that she is at multiple high risks giving her sarcoid, and liver disease, in a face-to-face, personal, informed consent process, the benefits, alternatives,   risks including not limited to bleeding, infection, risks of anesthesia, open surgery, DVT/PE, heart attack, stroke, death, damage to local structures(e g  ureter, duodenum),anastomotic leak requiring reoperation, temporary versus permanent stoma  They understood these risks, signed informed consent, and wish to proceed      PLAN:   Robotic, possible laparoscopic versus open low anterior resection to be scheduled, day prior colonoscopy  Type/Screen, A1c added today  Ureteral Stent exchange upcoming  CC  Dr Rosario Connolly, PA-C  Dr Carmen Dailey, RN  Torsten Dove  GI Tumor Conference

## 2023-06-26 ENCOUNTER — DOCUMENTATION (OUTPATIENT)
Dept: HEMATOLOGY ONCOLOGY | Facility: CLINIC | Age: 58
End: 2023-06-26

## 2023-06-26 NOTE — PROGRESS NOTES
TB rqst sent to GI onc [facility] pool to add pt on for 8/24 lower GI meeting POST OP, to be presented by Dr Sweetie Pablo

## 2023-06-28 ENCOUNTER — TELEPHONE (OUTPATIENT)
Dept: GASTROENTEROLOGY | Facility: CLINIC | Age: 58
End: 2023-06-28

## 2023-06-28 NOTE — TELEPHONE ENCOUNTER
Spoke with pts guardian and asked if pt qian was able to get lab work done for dr rucker before upcoming appt  and he stated he will be going this week to complete  I did advise some where fasting blood work

## 2023-06-29 ENCOUNTER — TELEMEDICINE (OUTPATIENT)
Dept: RADIATION ONCOLOGY | Facility: CLINIC | Age: 58
End: 2023-06-29
Attending: STUDENT IN AN ORGANIZED HEALTH CARE EDUCATION/TRAINING PROGRAM

## 2023-06-29 DIAGNOSIS — C20 RECTAL CANCER (HCC): Primary | ICD-10-CM

## 2023-06-29 NOTE — PROGRESS NOTES
Telephone Follow-up - Radiation Oncology   Dayna Bridges 1965 56 y o  female [id number]      History of Present Illness   Cancer Staging   Malignant neoplasm of sigmoid colon [facility])  Staging form: Colon and Rectum, AJCC 8th Edition  - Clinical: Stage IIA (cT3, cN0, cM0) - Signed by Travis Perdomo MD on 2/24/2023  Total positive nodes: 0    Ms  Dayna Bridgse is a 56year old woman with a history of sarcoidosis who presented with Stage IIA (cT3N0) mid to high rectal adenocarcinoma  She was started on CapeOx in [place] and received 3 cycles with significant difficulty  She thereafter transferred her care to [facility] with decision to transition to chemoRT  On 5/25/23 she completed a course of neoadjuvant chemoRT to a dose of 5040 cGy in 28 fractions with concurrent 5-FU  She returns today for follow-up  Interval History:  The patient had an overall difficult time tolerating treatment  She had significant baseline back and pelvic pain of unclear etiology that preceded chemoRT and did worsen during treatment  She also experienced dysuria and hematuria, potentially attributable to her known ureteral stents vs RT  Currently the patient is doing doing poor overall  She continues to have severe ongoing intermittent pain as well as dysuria with hematuria  She has ongoing rectal discharge  She has been taking opioid pain medication as prescribed by Dr Bob Woodward  Historical Information   Oncology History   Malignant neoplasm of sigmoid colon ([facility] [address] )   10/28/2022 Biopsy    Colon, Rectosigmoid Colon Mass Biopsy (1 slide [address] , collected 10/28/2022):  - Adenocarcinoma arising in a tubular adenoma     12/1/2022 Biopsy    DIAGNOSIS LIVER, SEGMENT 3, RESECTION:  - HYALINIZED SUBCAPSULAR AND INTRA-PARENCHYMAL NODULES  NO MALIGNANCY SEEN  SEE NOTE  - MILD MACROVESICULAR STEATOSIS (25%)  NO EVIDENCE OF STEATOHEPATITIS   TRICHOME STAIN SHOWS MILD PORTAL FIBROSIS    - 2+ IRON DEPOSITION WITHIN KUPFFER CELLS (IRON STAIN), CONSISTENT WITH SECONDARY HEMOCHROMATOSIS  NOTE: THIS LIKELY REPRESENTS A MARKEDLY SCLEROTIC HEMANGIOMA OR CHANGES SECONDARY TO INJURY  12/1/2022 Surgery    Laparoscopic diverting colostomy with liver biopsy  Dr Blossom De La Cruz       12/25/2022 Initial Diagnosis    Malignant neoplasm of sigmoid colon ([facility] [address] )     2022 - 1/10/2023 Chemotherapy    Oxaliplatin  [address]     2/24/2023 -  Cancer Staged    Staging form: Colon and Rectum, AJCC 8th Edition  - Clinical: Stage IIA (cT3, cN0, cM0) - Signed by Natalia Reese MD on 2/24/2023  Total positive nodes: 0       4/17/2023 -  Chemotherapy    potassium chloride, 20 mEq, Intravenous, Once, 3 of 3 cycles  Administration: 20 mEq (4/17/2023), 20 mEq (4/24/2023), 20 mEq (5/1/2023)  alteplase (CATHFLO), 2 mg, Intracatheter, Every 1 Minute as needed, 6 of 6 cycles  fluorouracil (ADRUCIL) ambulatory infusion Soln, 225 mg/m2/day = 1,880 mg, Intravenous, Over 120 hours, 6 of 6 cycles     4/17/2023 - 5/25/2023 Radiation    Treatments:  Course: C1    Plan ID Energy Fractions Dose per Fraction (cGy) Dose Correction (cGy) Total Dose Delivered (cGy) Elapsed Days   CD Rectum 6X 3 / 3 180 0 540 2   Whole Pelvis 6X 25 / 25 180 0 4,500 35      Treatment Dates:  4/17/2023 - 5/25/2023            Past Medical History:   Diagnosis Date   • Cancer [facility])    • Colon cancer ([facility] )    • Fall    • Headache    • Hemochromatosis, unspecified    • History of transfusion     2022 - no adverse reaction   • Kidney calculi    • Kidney stone    • Liver disease     hemangioma   • Muscle weakness    • Personal history of COVID-19 12/2022   • Sarcoidosis    • Seizures ([facility] )     2022     Past Surgical History:   Procedure Laterality Date   • ABSCESS DRAINAGE      left breast   • CHEST TUBE INSERTION     • COLON SURGERY      colostomy   • COLONOSCOPY     • FL GUIDED CENTRAL VENOUS ACCESS DEVICE INSERTION  3/21/2023   • FL RETROGRADE PYELOGRAM 1/23/2023   • FL RETROGRADE PYELOGRAM  4/3/2023   • IR BIOPSY LIVER MASS  5/9/2023   • LUNG BIOPSY     • VT CYSTO/URETERO W/LITHOTRIPSY &INDWELL STENT INSRT Bilateral 1/23/2023    Procedure: CYSTOSCOPY  RIGHT URETEROSCOPY WITH LITHOTRIPSY HOLMIUM LASER, BILATERAL RETROGRADE PYELOGRAM AND BILATERAL  URETERAL STENT INSERTION;  Surgeon: Kamlesh Cordero MD;  [facility];  Service: Urology   • VT CYSTO/URETERO W/LITHOTRIPSY &INDWELL STENT INSRT Right 4/3/2023    Procedure: RIGHT URETEROSCOPY WITH LITHOTRIPSY HOLMIUM LASER, RETROGRADE PYELOGRAM; BILATERAL EXCHANGE STENT URETERAL;  Surgeon: Kamlesh Cordero MD;  Location: [facility];  Service: Urology   • VT INSJ TUNNELED CTR VAD W/SUBQ PORT AGE 5 YR/> N/A 3/21/2023    Procedure: INSERTION VENOUS PORT ( PORT-A-CATH) IR;  Surgeon: Merrick Toledo DO;  [facility];  Service: Interventional Radiology   • TONSILLECTOMY     • URINARY SURGERY Bilateral     bilateral stents       Social History   Social History     Substance and Sexual Activity   Alcohol Use Never     Social History     Substance and Sexual Activity   Drug Use Never     Social History     Tobacco Use   Smoking Status Never   • Passive exposure: Past   Smokeless Tobacco Never         Meds/Allergies     Current Outpatient Medications:   •  acetaminophen (TYLENOL) 500 mg tablet, Take 2 tablets (1,000 mg total) by mouth 3 (three) times a day, Disp: 180 tablet, Rfl: 2  •  amLODIPine (NORVASC) 5 mg tablet, Take 5 mg by mouth daily (Patient not taking: Reported on 3/13/2023), Disp: , Rfl:   •  CAPECITABINE PO, Take 1,650 mg by mouth 2 (two) times a day (Patient not taking: Reported on 3/17/2023), Disp: , Rfl:   •  cyanocobalamin (VITAMIN B-12) 1000 MCG tablet, Take 1,000 mcg by mouth daily, Disp: , Rfl:   •  cyclobenzaprine (FLEXERIL) 5 mg tablet, Take 1 tablet (5 mg total) by mouth 3 (three) times a day as needed for muscle spasms (Patient not taking: Reported on 6/21/2023), Disp: 90 tablet, Rfl: 2  •  docusate sodium (COLACE) 100 mg capsule, Take 1 capsule (100 mg total) by mouth 2 (two) times a day for 15 days, Disp: 30 capsule, Rfl: 0  •  fluorouracil (ADRUCIL) 5 GM/100ML SOLN, , Disp: , Rfl:   •  [START ON 7/1/2023] metroNIDAZOLE (FLAGYL) 500 mg tablet, 1 PM AND 10PM Do not start before July 1, 2023 , Disp: 2 tablet, Rfl: 0  •  [START ON 7/1/2023] neomycin (MYCIFRADIN) 500 mg tablet, 1pm and 10pm Do not start before July 1, 2023 , Disp: 2 tablet, Rfl: 0  •  oxybutynin (DITROPAN) 5 mg tablet, TAKE 1 TABLET (5 MG TOTAL) BY MOUTH EVERY 6 (SIX) HOURS AS NEEDED (BLADDER SPASMS) (Patient not taking: Reported on 6/21/2023), Disp: 30 tablet, Rfl: 0  •  oxyCODONE (Roxicodone) 5 immediate release tablet, Take 1-2 tablets (5-10 mg total) by mouth every 4 (four) hours as needed for moderate pain or severe pain Max Daily Amount: 60 mg, Disp: 180 tablet, Rfl: 0  •  potassium chloride (MICRO-K) 10 MEQ CR capsule, TAKE 4 CAPSULES (40 MEQ TOTAL) BY MOUTH 2 (TWO) TIMES A DAY, Disp: 240 capsule, Rfl: 1  •  Potassium Gluconate 550 MG TABS, Take 1 tablet by mouth in the morning (Patient not taking: Reported on 6/5/2023), Disp: , Rfl:   •  predniSONE 10 mg tablet, TAKE 2 TABLETS BY MOUTH EVERY DAY (Patient not taking: Reported on 6/21/2023), Disp: 60 tablet, Rfl: 1  •  predniSONE 10 mg tablet, Take 4 tablets (40 mg total) by mouth daily Please take for 4 weeks and then decrease by 5 mg increments every 2 weeks  , Disp: 500 tablet, Rfl: 0  •  tamsulosin (FLOMAX) 0 4 mg, Take 1 capsule (0 4 mg total) by mouth daily with dinner (Patient not taking: Reported on 4/6/2023), Disp: 15 capsule, Rfl: 0  •  ursodiol (ACTIGALL) 300 mg capsule, Take 3 capsules (900 mg total) by mouth in the morning, Disp: 90 capsule, Rfl: 11  Allergies   Allergen Reactions   • Oxaliplatin Shortness Of Breath     Reactions occurred with 2nd and 3rd infusions (about 1-3 hours from initiation of infusion) and required treatment with steroids and "antihistamines  Please refer to allergy note on 2/7/2023 for detailed description of her reactions  OBJECTIVE:   There were no vitals taken for this visit  No exam performed  Assessment/Plan:  No orders of the defined types were placed in this encounter  Approximately 1 month following completion of chemoRT the patient is doing fair overall  She does have significant ongoing symptoms, though it is unclear as to what the etiology is behind these as so many preceded treatment  She is scheduled for post-treatment imaging with colorectal surgery and LAR in 8/2023  I will plan to see her back in approximately 6 months  I will remain available in the meantime should any questions or concerns arise  Kary Baum MD  6/37/0862,0:29 PM    Portions of the record may have been created with voice recognition software   Occasional wrong word or \"sound a like\" substitutions may have occurred due to the inherent limitations of voice recognition software   Read the chart carefully and recognize, using context, where substitutions have occurred        "

## 2023-06-29 NOTE — PROGRESS NOTES
Virtual Brief Visit    Problem List Items Addressed This Visit    None            Reason for visit is follow up  Encounter provider Mirna Grubbs MD    Provider located at [address] [facility] [place]  [address]  [address] [facility] [address]      Recent Visits  No visits were found meeting these conditions  Showing recent visits within past 7 days and meeting all other requirements  Today's Visits  Date Type Provider Dept   06/29/23 Telemedicine Mirna Grubbs MD [facility]   Showing today's visits and meeting all other requirements  Future Appointments  No visits were found meeting these conditions  Showing future appointments within next 150 days and meeting all other requirements       After connecting through Telephone, the patient was identified by name and date of birth  Rosy Valentine was informed that this is a telemedicine visit and that the visit is being conducted through Telephone  My office door was closed  No one else was in the room  She acknowledged consent and understanding of privacy and security of the video platform  The patient has agreed to participate and understands they can discontinue the visit at any time  Patient is aware this is a billable service  Jonny Weems is a 56 y o  female with a history of sarcoidosis who presented with Stage IIA (cT3N0) mid to high rectal adenocarcinoma  She was started on CapeOx in [place] and received 3 cycles with significant difficulty  She thereafter transferred her care to [place] [id number] with decision to transition to chemoRT  On 5/25/23 she completed a course of neoadjuvant chemoRT to a dose of 5040 cGy in 28 fractions with concurrent 5-FU  The patient had an overall difficult time tolerating treatment  She had significant baseline back and pelvic pain of unclear etiology that preceded chemoRT and did worsen during treatment   She also experiences dysuria and hematuria, potentially attributable to her known ureteral stents  Following completion of chemoRT she will continue follow-up with her other managing medical providers  6/15/23 CT C/A/P wo contrast  No definite metastatic disease in the chest abdomen or pelvis    Splenomegaly with areas of nodular decreased enhancement, the appearance is suggestive of sarcoidosis rather than metastatic disease especially given the lack of metastatic disease elsewhere  Visible only in retrospect, this has been present since at   least 2023 although more conspicuous on the current scan  Recommend attention on follow-up imaging versus tissue sampling percutaneously    Mild colonic wall thickening involving the ascending colon versus under distention not significantly changed  Recommend attention on follow-up imaging  Indwelling bilateral ureteral stents with decrease in bilateral hydronephrosis  The proximal portion of the right stent may be encrusted  Calculi adjacent to the distal left ureter unchanged  23 Dr Balta Roe  Discussed Robotic, possible laparoscopic versus open low anterior resection, this will include colostomy reversal, she understands likely need for diverting loop ileostomy postradiation  We did discuss a one-step procedure keeping colostomy however she wanted attempts at reconstruction    day prior colonoscopy    23 Palliative Care      Upcomin23 MRI pelvis rectal cancer staging  23 Palliative Care  23 Ureteral stent exchange  23 RESECTION COLON LOW ANTERIOR LAPAROSCOPIC WITH ROBOTICS         Past Medical History:   Diagnosis Date   • Cancer [facility])    • Colon cancer ([facility] [address] )    • Fall    • Headache    • Hemochromatosis, unspecified    • History of transfusion      - no adverse reaction   • Kidney calculi    • Kidney stone    • Liver disease     hemangioma   • Muscle weakness    • Personal history of COVID-19 2022   • Sarcoidosis    • Seizures ([address] )            Past Surgical History:   Procedure Laterality Date   • ABSCESS DRAINAGE      left breast   • CHEST TUBE INSERTION     • COLON SURGERY      colostomy   • COLONOSCOPY     • FL GUIDED CENTRAL VENOUS ACCESS DEVICE INSERTION  3/21/2023   • FL RETROGRADE PYELOGRAM  1/23/2023   • FL RETROGRADE PYELOGRAM  4/3/2023   • IR BIOPSY LIVER MASS  5/9/2023   • LUNG BIOPSY     • WY CYSTO/URETERO W/LITHOTRIPSY &INDWELL STENT INSRT Bilateral 1/23/2023    Procedure: CYSTOSCOPY  RIGHT URETEROSCOPY WITH LITHOTRIPSY HOLMIUM LASER, BILATERAL RETROGRADE PYELOGRAM AND BILATERAL  URETERAL STENT INSERTION;  Surgeon: Michelle Cho MD;  [facility];  Service: Urology   • [place] CYSTO/URETERO W/LITHOTRIPSY &INDWELL STENT INSRT Right 4/3/2023    Procedure: RIGHT URETEROSCOPY WITH LITHOTRIPSY HOLMIUM LASER, RETROGRADE PYELOGRAM; BILATERAL EXCHANGE STENT URETERAL;  Surgeon: Michelle Cho MD;  [facility]: [facility];  Service: Urology   • WY INSJ TUNNELED CTR VAD W/SUBQ PORT AGE 5 YR/> N/A 3/21/2023    Procedure: INSERTION VENOUS PORT ( PORT-A-CATH) IR;  Surgeon: Chelsie Shoemaker DO;  [facility];  Service: Interventional Radiology   • TONSILLECTOMY     • URINARY SURGERY Bilateral     bilateral stents       Current Outpatient Medications   Medication Sig Dispense Refill   • acetaminophen (TYLENOL) 500 mg tablet Take 2 tablets (1,000 mg total) by mouth 3 (three) times a day 180 tablet 2   • amLODIPine (NORVASC) 5 mg tablet Take 5 mg by mouth daily (Patient not taking: Reported on 3/13/2023)     • CAPECITABINE PO Take 1,650 mg by mouth 2 (two) times a day (Patient not taking: Reported on 3/17/2023)     • cyanocobalamin (VITAMIN B-12) 1000 MCG tablet Take 1,000 mcg by mouth daily     • cyclobenzaprine (FLEXERIL) 5 mg tablet Take 1 tablet (5 mg total) by mouth 3 (three) times a day as needed for muscle spasms (Patient not taking: Reported on 6/21/2023) 90 tablet 2   • docusate sodium (COLACE) 100 mg capsule Take 1 capsule (100 mg total) by mouth 2 (two) times a day for 15 days 30 capsule 0   • fluorouracil (ADRUCIL) 5 GM/100ML SOLN  (Patient not taking: Reported on 5/31/2023)     • [START ON 7/1/2023] metroNIDAZOLE (FLAGYL) 500 mg tablet 1 PM AND 10PM Do not start before July 1, 2023  2 tablet 0   • [START ON 7/1/2023] neomycin (MYCIFRADIN) 500 mg tablet 1pm and 10pm Do not start before July 1, 2023  2 tablet 0   • oxybutynin (DITROPAN) 5 mg tablet TAKE 1 TABLET (5 MG TOTAL) BY MOUTH EVERY 6 (SIX) HOURS AS NEEDED (BLADDER SPASMS) (Patient not taking: Reported on 6/21/2023) 30 tablet 0   • oxyCODONE (Roxicodone) 5 immediate release tablet Take 1-2 tablets (5-10 mg total) by mouth every 4 (four) hours as needed for moderate pain or severe pain Max Daily Amount: 60 mg 180 tablet 0   • potassium chloride (MICRO-K) 10 MEQ CR capsule TAKE 4 CAPSULES (40 MEQ TOTAL) BY MOUTH 2 (TWO) TIMES A  capsule 1   • Potassium Gluconate 550 MG TABS Take 1 tablet by mouth in the morning (Patient not taking: Reported on 6/5/2023)     • predniSONE 10 mg tablet TAKE 2 TABLETS BY MOUTH EVERY DAY (Patient not taking: Reported on 6/21/2023) 60 tablet 1   • predniSONE 10 mg tablet Take 4 tablets (40 mg total) by mouth daily Please take for 4 weeks and then decrease by 5 mg increments every 2 weeks  500 tablet 0   • tamsulosin (FLOMAX) 0 4 mg Take 1 capsule (0 4 mg total) by mouth daily with dinner (Patient not taking: Reported on 4/6/2023) 15 capsule 0   • ursodiol (ACTIGALL) 300 mg capsule Take 3 capsules (900 mg total) by mouth in the morning 90 capsule 11     No current facility-administered medications for this visit  Allergies   Allergen Reactions   • Oxaliplatin Shortness Of Breath     Reactions occurred with 2nd and 3rd infusions (about 1-3 hours from initiation of infusion) and required treatment with steroids and antihistamines  Please refer to allergy note on 2/7/2023 for detailed description of her reactions         I spent  minutes with the patient during this visit  Follow up visit     Oncology History   Malignant neoplasm of sigmoid colon ([facility] [address] )   10/28/2022 Biopsy    Colon, Rectosigmoid Colon Mass Biopsy (1 slide [address] , collected 10/28/2022):  - Adenocarcinoma arising in a tubular adenoma     12/1/2022 Biopsy    DIAGNOSIS LIVER, SEGMENT 3, RESECTION:  - HYALINIZED SUBCAPSULAR AND INTRA-PARENCHYMAL NODULES  NO MALIGNANCY SEEN  SEE NOTE  - MILD MACROVESICULAR STEATOSIS (25%)  NO EVIDENCE OF STEATOHEPATITIS  TRICHOME STAIN SHOWS MILD PORTAL FIBROSIS    - 2+ IRON DEPOSITION WITHIN KUPFFER CELLS (IRON STAIN), CONSISTENT WITH SECONDARY HEMOCHROMATOSIS  NOTE: THIS LIKELY REPRESENTS A MARKEDLY SCLEROTIC HEMANGIOMA OR CHANGES SECONDARY TO INJURY  12/1/2022 Surgery    Laparoscopic diverting colostomy with liver biopsy  Dr Misa Leos       12/25/2022 Initial Diagnosis    Malignant neoplasm of sigmoid colon ([facility] [address] )     2022 - 1/10/2023 Chemotherapy    Oxaliplatin  [address]     2/24/2023 -  Cancer Staged    Staging form: Colon and Rectum, AJCC 8th Edition  - Clinical: Stage IIA (cT3, cN0, cM0) - Signed by Ivet Dc MD on 2/24/2023  Total positive nodes: 0       4/17/2023 -  Chemotherapy    potassium chloride, 20 mEq, Intravenous, Once, 3 of 3 cycles  Administration: 20 mEq (4/17/2023), 20 mEq (4/24/2023), 20 mEq (5/1/2023)  alteplase (CATHFLO), 2 mg, Intracatheter, Every 1 Minute as needed, 6 of 6 cycles  fluorouracil (ADRUCIL) ambulatory infusion Soln, 225 mg/m2/day = 1,880 mg, Intravenous, Over 120 hours, 6 of 6 cycles     4/17/2023 - 5/25/2023 Radiation    Treatments:  Course: C1    Plan ID Energy Fractions Dose per Fraction (cGy) Dose Correction (cGy) Total Dose Delivered (cGy) Elapsed Days   CD Rectum 6X 3 / 3 180 0 540 2   Whole Pelvis 6X 25 / 25 180 0 4,500 35      Treatment Dates:  4/17/2023 - 5/25/2023            Review of Systems:  Review of Systems    Clinical Trial: no        Health Maintenance   Topic Date Due   • Hepatitis C Screening  Never done   • COVID-19 Vaccine (1) Never done   • Pneumococcal Vaccine: Pediatrics (0 to 5 Years) and At-Risk Patients (6 to 59 Years) (1 - PCV) Never done   • HIV Screening  Never done   • BMI: Followup Plan  Never done   • Annual Physical  Never done   • DTaP,Tdap,and Td Vaccines (1 - Tdap) Never done   • Cervical Cancer Screening  Never done   • Breast Cancer Screening: Mammogram  Never done   • Influenza Vaccine (Season Ended) 09/01/2023   • Depression Screening  03/23/2024   • BMI: Adult  06/21/2024   • HIB Vaccine  Aged Out   • IPV Vaccine  Aged Out   • Hepatitis A Vaccine  Aged Out   • Meningococcal ACWY Vaccine  Aged Out   • HPV Vaccine  Aged Out   • Colorectal Cancer Screening  Discontinued     Patient Active Problem List   Diagnosis   • Malignant neoplasm of sigmoid colon ([address] )   • History of Clostridium difficile colitis   • Other hemochromatosis   • Primary hypertension   • Sarcoidosis   • Impaired fasting glucose   • Hypokalemia   • Transaminitis   • R flank pain with bilateral hydronephrosis   • Adrenal nodule ([address] )   • Bleeding from colostomy stoma ([address] )   • Other specified anemias   • Thrombocytopenia ([address] )   • Nonrheumatic mitral valve regurgitation   • Advance directive discussed with patient   • Gross hematuria   • Skin lesion   • Abnormal CT of the chest   • Kidney calculi   • Dysuria   • Chest pain   • Abnormal TSH   • Healthcare maintenance   • Port-A-Cath in place   • Drug-induced constipation   • Palliative care patient   • Cancer related pain   • Drug-induced diarrhea     Past Medical History:   Diagnosis Date   • Cancer [facility])    • Colon cancer ([address] )    • Fall    • Headache    • Hemochromatosis, unspecified    • History of transfusion     2022 - no adverse reaction   • Kidney calculi    • Kidney stone    • Liver disease     hemangioma   • Muscle weakness    • Personal history of COVID-19 12/2022   • Sarcoidosis    • Seizures ([facility] [address] )     2022     Past Surgical History:   Procedure Laterality Date   • ABSCESS DRAINAGE      left breast   • CHEST TUBE INSERTION     • COLON SURGERY      colostomy   • COLONOSCOPY     • FL GUIDED CENTRAL VENOUS ACCESS DEVICE INSERTION  3/21/2023   • FL RETROGRADE PYELOGRAM  1/23/2023   • FL RETROGRADE PYELOGRAM  4/3/2023   • IR BIOPSY LIVER MASS  5/9/2023   • LUNG BIOPSY     • AR CYSTO/URETERO W/LITHOTRIPSY &INDWELL STENT INSRT Bilateral 1/23/2023    Procedure: CYSTOSCOPY  RIGHT URETEROSCOPY WITH LITHOTRIPSY HOLMIUM LASER, BILATERAL RETROGRADE PYELOGRAM AND BILATERAL  URETERAL STENT INSERTION;  Surgeon: Mykel Coon MD;  [facility];  Service: Urology   • AR CYSTO/URETERO W/LITHOTRIPSY &INDWELL STENT INSRT Right 4/3/2023    Procedure: RIGHT URETEROSCOPY WITH LITHOTRIPSY HOLMIUM LASER, RETROGRADE PYELOGRAM; BILATERAL EXCHANGE STENT URETERAL;  Surgeon: Mykel Coon MD;  [facility];  Service: Urology   • AR INSJ TUNNELED CTR VAD W/SUBQ PORT AGE 5 YR/> N/A 3/21/2023    Procedure: INSERTION VENOUS PORT ( PORT-A-CATH) IR;  Surgeon: Adelia Waterman DO;  [facility];  Service: Interventional Radiology   • TONSILLECTOMY     • URINARY SURGERY Bilateral     bilateral stents     Family History   Problem Relation Age of Onset   • Cancer Mother    • Cirrhosis Father      Social History     Socioeconomic History   • Marital status:      Spouse name: Not on file   • Number of children: Not on file   • Years of education: Not on file   • Highest education level: Not on file   Occupational History   • Not on file   Tobacco Use   • Smoking status: Never     Passive exposure: Past   • Smokeless tobacco: Never   Vaping Use   • Vaping Use: Never used   Substance and Sexual Activity   • Alcohol use: Never   • Drug use: Never   • Sexual activity: Not Currently   Other Topics Concern   • Not on file   Social History Narrative    From 4/14/23  note:    Emergency Contact: WALDO TREVIZO (ex-) [phone] (Mobile)    Marital Status:     Caregiver/Support: ex- -Ksenia Cheatham and two dtrs  Children: two dtrs- [place] 21 in [place] and [place] [facility]    Child/Elder care: no     Housing: two story house    Home Setup: one level    Lives With:  -waldo and two dtrs    Daily Living Activities: independent    Durable Medical Equipment: No    Ambulation: independent    Employment: disabled-SSI-$100 and pension-$700     Status/Location: no     Ability to pay bills: yes  No barriers to paying monthly bills  POA/LW/AD: no   - Ksenia Cheatham is healthcare representative  MSW emailed advance directive  Social Determinants of Health     Financial Resource Strain: Not on file   Food Insecurity: No Food Insecurity (2/13/2023)    Hunger Vital Sign    • Worried About Running Out of Food in the Last Year: Never true    • Ran Out of Food in the Last Year: Never true   Transportation Needs: No Transportation Needs (2/13/2023)    PRAPARE - Transportation    • Lack of Transportation (Medical): No    • Lack of Transportation (Non-Medical):  No   Physical Activity: Not on file   Stress: Not on file   Social Connections: Not on file   Intimate Partner Violence: Not on file   Housing Stability: Low Risk  (2/13/2023)    Housing Stability Vital Sign    • Unable to Pay for Housing in the Last Year: No    • Number of Places Lived in the Last Year: 1    • Unstable Housing in the Last Year: No       Current Outpatient Medications:   •  acetaminophen (TYLENOL) 500 mg tablet, Take 2 tablets (1,000 mg total) by mouth 3 (three) times a day, Disp: 180 tablet, Rfl: 2  •  amLODIPine (NORVASC) 5 mg tablet, Take 5 mg by mouth daily (Patient not taking: Reported on 3/13/2023), Disp: , Rfl:   •  CAPECITABINE PO, Take 1,650 mg by mouth 2 (two) times a day (Patient not taking: Reported on 3/17/2023), Disp: , Rfl:   •  cyanocobalamin (VITAMIN B-12) 1000 MCG tablet, Take 1,000 mcg by mouth daily, Disp: , Rfl:   •  cyclobenzaprine (FLEXERIL) 5 mg tablet, Take 1 tablet (5 mg total) by mouth 3 (three) times a day as needed for muscle spasms (Patient not taking: Reported on 6/21/2023), Disp: 90 tablet, Rfl: 2  •  docusate sodium (COLACE) 100 mg capsule, Take 1 capsule (100 mg total) by mouth 2 (two) times a day for 15 days, Disp: 30 capsule, Rfl: 0  •  fluorouracil (ADRUCIL) 5 GM/100ML SOLN, , Disp: , Rfl:   •  [START ON 7/1/2023] metroNIDAZOLE (FLAGYL) 500 mg tablet, 1 PM AND 10PM Do not start before July 1, 2023 , Disp: 2 tablet, Rfl: 0  •  [START ON 7/1/2023] neomycin (MYCIFRADIN) 500 mg tablet, 1pm and 10pm Do not start before July 1, 2023 , Disp: 2 tablet, Rfl: 0  •  oxybutynin (DITROPAN) 5 mg tablet, TAKE 1 TABLET (5 MG TOTAL) BY MOUTH EVERY 6 (SIX) HOURS AS NEEDED (BLADDER SPASMS) (Patient not taking: Reported on 6/21/2023), Disp: 30 tablet, Rfl: 0  •  oxyCODONE (Roxicodone) 5 immediate release tablet, Take 1-2 tablets (5-10 mg total) by mouth every 4 (four) hours as needed for moderate pain or severe pain Max Daily Amount: 60 mg, Disp: 180 tablet, Rfl: 0  •  potassium chloride (MICRO-K) 10 MEQ CR capsule, TAKE 4 CAPSULES (40 MEQ TOTAL) BY MOUTH 2 (TWO) TIMES A DAY, Disp: 240 capsule, Rfl: 1  •  Potassium Gluconate 550 MG TABS, Take 1 tablet by mouth in the morning (Patient not taking: Reported on 6/5/2023), Disp: , Rfl:   •  predniSONE 10 mg tablet, TAKE 2 TABLETS BY MOUTH EVERY DAY (Patient not taking: Reported on 6/21/2023), Disp: 60 tablet, Rfl: 1  •  predniSONE 10 mg tablet, Take 4 tablets (40 mg total) by mouth daily Please take for 4 weeks and then decrease by 5 mg increments every 2 weeks  , Disp: 500 tablet, Rfl: 0  •  tamsulosin (FLOMAX) 0 4 mg, Take 1 capsule (0 4 mg total) by mouth daily with dinner (Patient not taking: Reported on 4/6/2023), Disp: 15 capsule, Rfl: 0  •  ursodiol (ACTIGALL) 300 mg capsule, Take 3 capsules (900 mg total) by mouth in the morning, Disp: 90 capsule, Rfl: 11  Allergies   Allergen Reactions   • Oxaliplatin Shortness Of Breath     Reactions occurred with 2nd and 3rd infusions (about 1-3 hours from initiation of infusion) and required treatment with steroids and antihistamines  Please refer to allergy note on 2/7/2023 for detailed description of her reactions  There were no vitals filed for this visit

## 2023-06-30 ENCOUNTER — APPOINTMENT (OUTPATIENT)
Dept: LAB | Facility: AMBULARY SURGERY CENTER | Age: 58
End: 2023-06-30
Payer: COMMERCIAL

## 2023-06-30 DIAGNOSIS — C18.7 MALIGNANT NEOPLASM OF SIGMOID COLON (HCC): ICD-10-CM

## 2023-06-30 LAB
ABO GROUP BLD: NORMAL
AFP-TM SERPL-MCNC: 1.73 NG/ML (ref 0–9)
ANA SER QL IA: NEGATIVE
BLD GP AB SCN SERPL QL: NEGATIVE
EST. AVERAGE GLUCOSE BLD GHB EST-MCNC: 134 MG/DL
FERRITIN SERPL-MCNC: 27 NG/ML (ref 11–307)
HAV AB SER QL IA: REACTIVE
HBA1C MFR BLD: 6.3 %
HBV CORE AB SER QL: NORMAL
HBV SURFACE AB SER-ACNC: 280 MIU/ML
HCV AB SER QL: NORMAL
RH BLD: POSITIVE
SPECIMEN EXPIRATION DATE: NORMAL
TREPONEMA PALLIDUM IGG+IGM AB [PRESENCE] IN SERUM OR PLASMA BY IMMUNOASSAY: NORMAL

## 2023-06-30 PROCEDURE — 86901 BLOOD TYPING SEROLOGIC RH(D): CPT

## 2023-06-30 PROCEDURE — 82728 ASSAY OF FERRITIN: CPT

## 2023-06-30 PROCEDURE — 86381 MITOCHONDRIAL ANTIBODY EACH: CPT

## 2023-06-30 PROCEDURE — 86301 IMMUNOASSAY TUMOR CA 19-9: CPT

## 2023-06-30 PROCEDURE — 86708 HEPATITIS A ANTIBODY: CPT

## 2023-06-30 PROCEDURE — 86803 HEPATITIS C AB TEST: CPT

## 2023-06-30 PROCEDURE — 86850 RBC ANTIBODY SCREEN: CPT

## 2023-06-30 PROCEDURE — 82104 ALPHA-1-ANTITRYPSIN PHENO: CPT

## 2023-06-30 PROCEDURE — 83036 HEMOGLOBIN GLYCOSYLATED A1C: CPT

## 2023-06-30 PROCEDURE — 36415 COLL VENOUS BLD VENIPUNCTURE: CPT

## 2023-06-30 PROCEDURE — 86015 ACTIN ANTIBODY EACH: CPT

## 2023-06-30 PROCEDURE — 86780 TREPONEMA PALLIDUM: CPT

## 2023-06-30 PROCEDURE — 86038 ANTINUCLEAR ANTIBODIES: CPT

## 2023-06-30 PROCEDURE — 82787 IGG 1 2 3 OR 4 EACH: CPT

## 2023-06-30 PROCEDURE — 86704 HEP B CORE ANTIBODY TOTAL: CPT

## 2023-06-30 PROCEDURE — 82784 ASSAY IGA/IGD/IGG/IGM EACH: CPT

## 2023-06-30 PROCEDURE — 86900 BLOOD TYPING SEROLOGIC ABO: CPT

## 2023-06-30 PROCEDURE — 86706 HEP B SURFACE ANTIBODY: CPT

## 2023-06-30 PROCEDURE — 82103 ALPHA-1-ANTITRYPSIN TOTAL: CPT

## 2023-06-30 PROCEDURE — 86480 TB TEST CELL IMMUN MEASURE: CPT

## 2023-06-30 PROCEDURE — 82105 ALPHA-FETOPROTEIN SERUM: CPT

## 2023-07-01 LAB
A1AT SERPL-MCNC: 133 MG/DL (ref 101–187)
ACTIN IGG SERPL-ACNC: 3 UNITS (ref 0–19)
CANCER AG19-9 SERPL-ACNC: 20 U/ML (ref 0–35)
MITOCHONDRIA M2 IGG SER-ACNC: <20 UNITS (ref 0–20)
T PALLIDUM AB SER QL IF: NON REACTIVE

## 2023-07-03 LAB
GAMMA INTERFERON BACKGROUND BLD IA-ACNC: 0.04 IU/ML
IGG SERPL-MCNC: 821 MG/DL (ref 586–1602)
IGG1 SER-MCNC: 409 MG/DL (ref 248–810)
IGG2 SER-MCNC: 241 MG/DL (ref 130–555)
IGG3 SER-MCNC: 25 MG/DL (ref 15–102)
IGG4 SER-MCNC: 17 MG/DL (ref 2–96)
M TB IFN-G BLD-IMP: NEGATIVE
M TB IFN-G CD4+ BCKGRND COR BLD-ACNC: 0.02 IU/ML
M TB IFN-G CD4+ BCKGRND COR BLD-ACNC: 0.03 IU/ML
MITOGEN IGNF BCKGRD COR BLD-ACNC: 2.87 IU/ML

## 2023-07-05 ENCOUNTER — HOSPITAL ENCOUNTER (OUTPATIENT)
Dept: INFUSION CENTER | Facility: CLINIC | Age: 58
Discharge: HOME | End: 2023-07-05
Payer: COMMERCIAL

## 2023-07-05 DIAGNOSIS — R39.89 SUSPECTED UTI: ICD-10-CM

## 2023-07-05 DIAGNOSIS — Z95.828 PORT-A-CATH IN PLACE: ICD-10-CM

## 2023-07-05 DIAGNOSIS — Z96.0 RETAINED URETERAL STENT: Primary | ICD-10-CM

## 2023-07-05 PROCEDURE — 87086 URINE CULTURE/COLONY COUNT: CPT | Performed by: UROLOGY

## 2023-07-06 ENCOUNTER — OFFICE VISIT (OUTPATIENT)
Dept: GASTROENTEROLOGY | Facility: AMBULARY SURGERY CENTER | Age: 58
End: 2023-07-06
Payer: COMMERCIAL

## 2023-07-06 VITALS
HEART RATE: 65 BPM | HEIGHT: 62 IN | OXYGEN SATURATION: 98 % | BODY MASS INDEX: 28.37 KG/M2 | DIASTOLIC BLOOD PRESSURE: 82 MMHG | WEIGHT: 154.2 LBS | SYSTOLIC BLOOD PRESSURE: 160 MMHG

## 2023-07-06 DIAGNOSIS — D86.89 HEPATIC GRANULOMA ASSOCIATED WITH SARCOIDOSIS: Primary | ICD-10-CM

## 2023-07-06 DIAGNOSIS — R79.89 ABNORMAL LIVER FUNCTION TESTS: ICD-10-CM

## 2023-07-06 LAB
A1AT PHENOTYP SERPL IFE: NORMAL
A1AT SERPL-MCNC: 135 MG/DL (ref 101–187)

## 2023-07-06 PROCEDURE — 99214 OFFICE O/P EST MOD 30 MIN: CPT | Performed by: STUDENT IN AN ORGANIZED HEALTH CARE EDUCATION/TRAINING PROGRAM

## 2023-07-06 NOTE — PROGRESS NOTES
[facility] - Outpatient Consultation  Guillaume Morales 56 y.o. female MRN: [id number]  Encounter: [id number]    PCP:  Esdras Stokes MD, [phone]  Referring Provider:  Esdras Stokes MD, [phone]    Patient: Guillaume Morales, 1965  Reason for Referral: liver sarcoidosis     ASSESSMENT/PLAN:  56 y.o. 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 She was noted to have progressive cholestasis dating back to December 2022 with intraoperative findings of diffuse hepatic nodules suggestive of cirrhosis. She underwent a wedge biopsy of segment 3 which showed hyalinized subcapsular and intra-parenchymal nodules with no malignancy. There was mild macrovesicular steatosis without steatohepatitis or advanced fibrosis. Pathology was suggestive of a sclerotic hemangioma with secondary hemochromatosis. She had a repeat biopsy for liver lesion and was found to have non-necrotizing granulomas consistent with hepatic involvement of her sarcoidosis. She was also found to have sinusoidal dilatation and perisinusoidal fibrosis, possibly related to [address] in the context of chemotherapy exposure. She was started on prednisone and UDCA with improvement of her liver enzymes and is awaiting rheumatologic evaluation for assessment of additional organ involvement.      Will wean her immunosuppression as able and we discussed that she will need to remain on [address] [place] for maintenance. She will return to clinic in 3 months or sooner if needed.  Thank you for the opportunity to consult in her care.      - Continue prednisone 35 mg daily and wean by 5 mg every 2 weeks   - Continue UDCA 900 mg daily  - CBC, CMP and INR every 2 weeks   - ESR/CRP and ACE every 2 weeks    Brigette Rodriguez MD  Division of Gastroenterology and Hepatology  [facility] [address]    ============================================================================  CC/HPI: 56 y.o. 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     Interval events  - Liver biopsy showed mild steatosis and sinusoidal dilatation with fibrosis and focal non-necrotizing granulomas, suggestive of sarcoid   - She was started on prednisone and UDCA for hepatic sarcoid with improvement of her liver tests  - Planned for LAR with Dr. Perri Winters in August     Extended liver history  She was diagnosed with pulmonary sarcoidosis in June 2022 when she was noted to have hypercalcemia and found to have diffuse micronodules involving both lungs with bilateral hilar, retroperitoneal and inguinal lymphadenopathy on PET/CT. There was no abnormal activity in the hepatobiliary system but there were reports of focal hypermetabolic activity in the lower sigmoid colon for which colonoscopy was recommended but no pursued. She had a left VATS and pleural biopsy in June 2022 which showed non-caseating granulomatous inflammation negative for Mycobacteria and fungal infections. She was started on prednisone in July 2022.     In October 2022, she was hospitalized for abdominal pain and was found to have sigmoid colon mass. She underwent COY in 10/28/2022 which showed malignant partially obstructing tumor in the rectosigmoid colon for which biopsies were positive for adenocarcinoma. She underwent a diverting colostomy in December 2022 when she presented with obstructive symptoms and was noted to have abnormal morphology of the liver. Her liver had cirrhotic appearance with micronodular pattern through both lobes during her colostomy and underwent a wedge biopsy of segment 3.  Pathology showed hyalized subcapsular and intra-parenchymal nodules with no malignancy but mild macrovesicular steatosis without steatohepatitis but mild portal fibrosis seen. Findings were most consistent with sclerotic hemangioma with secondary hemochromatosis.      She was started on oxalipatin which was then switched to 5-FU in 4/2023.      She was noted to have progressive cholestasis in December 2022 with recent . More recently, she had an MRI abdomen in March 2023 and was found to have a 1.6 cm segment 6 lesion with hypoenhancement in the arterial and portal venous phase with isointensity in the delayed phase, concerning for metastases. She is scheduled for liver biopsy on 5/9.      She denies excessive EtOH consumption and recreational drug use. She has no occupational exposures and denies supplement use. She has a family history of alcohol cirrhosis.     She is otherwise asymptomatic and denies jaundice, prurtius, darkened urine and acholic stools.     ROS: Complete review of systems otherwise negative.      PAST MEDICAL/SURGICAL HISTORY:  Past Medical History:   Diagnosis Date   • Cancer [facility])    • Colon cancer ([address])    • Fall    • Headache    • Hemochromatosis, unspecified    • History of transfusion     2022 - no adverse reaction   • Kidney calculi    • Kidney stone    • Liver disease     hemangioma   • Muscle weakness    • Personal history of COVID-19 12/2022   • Sarcoidosis    • Seizures ([address])     2022        Past Surgical History:   Procedure Laterality Date   • ABSCESS DRAINAGE      left breast   • CHEST TUBE INSERTION     • COLON SURGERY      colostomy   • COLONOSCOPY     • FL GUIDED CENTRAL VENOUS ACCESS DEVICE INSERTION  3/21/2023   • FL RETROGRADE PYELOGRAM  1/23/2023   • FL RETROGRADE PYELOGRAM  4/3/2023   • IR BIOPSY LIVER MASS  5/9/2023   • LUNG BIOPSY     • VT CYSTO/URETERO W/LITHOTRIPSY &INDWELL STENT INSRT Bilateral 1/23/2023    Procedure: CYSTOSCOPY  RIGHT URETEROSCOPY WITH LITHOTRIPSY HOLMIUM LASER, BILATERAL RETROGRADE PYELOGRAM AND BILATERAL  URETERAL STENT INSERTION; Surgeon: Ran Barnard MD;  Location[facility];  Service: Urology   • TX CYSTO/URETERO W/LITHOTRIPSY &INDWELL STENT INSRT Right 4/3/2023    Procedure: RIGHT URETEROSCOPY WITH LITHOTRIPSY HOLMIUM LASER, RETROGRADE PYELOGRAM; BILATERAL EXCHANGE STENT URETERAL;  Surgeon: Ran Barnard MD;  Location: [facility];  Service: Urology   • TX INSJ TUNNELED CTR VAD W/SUBQ PORT AGE 5 YR/> N/A 3/21/2023    Procedure: INSERTION VENOUS PORT ( PORT-A-CATH) IR;  Surgeon: Nay Robins DO;  [facility];  Service: Interventional Radiology   • TONSILLECTOMY     • URINARY SURGERY Bilateral     bilateral stents       FAMILY/SOCIAL HISTORY:  Family History   Problem Relation Age of Onset   • Cancer Mother    • Cirrhosis Father        Social History     Tobacco Use   • Smoking status: Never     Passive exposure: Past   • Smokeless tobacco: Never   Vaping Use   • Vaping Use: Never used   Substance Use Topics   • Alcohol use: Never   • Drug use: Never       MEDICATIONS:  Current Outpatient Medications on File Prior to Visit   Medication Sig Dispense Refill   • acetaminophen (TYLENOL) 500 mg tablet Take 2 tablets (1,000 mg total) by mouth 3 (three) times a day 180 tablet 2   • cyanocobalamin (VITAMIN B-12) 1000 MCG tablet Take 1,000 mcg by mouth daily     • oxyCODONE (Roxicodone) 5 immediate release tablet Take 1-2 tablets (5-10 mg total) by mouth every 4 (four) hours as needed for moderate pain or severe pain Max Daily Amount: 60 mg 180 tablet 0   • potassium chloride (MICRO-K) 10 MEQ CR capsule TAKE 4 CAPSULES (40 MEQ TOTAL) BY MOUTH 2 (TWO) TIMES A  capsule 1   • predniSONE 10 mg tablet Take 4 tablets (40 mg total) by mouth daily Please take for 4 weeks and then decrease by 5 mg increments every 2 weeks.  500 tablet 0   • ursodiol (ACTIGALL) 300 mg capsule Take 3 capsules (900 mg total) by mouth in the morning 90 capsule 11   • amLODIPine (NORVASC) 5 mg tablet Take 5 mg by mouth daily (Patient not taking: Reported on 3/13/2023)     • CAPECITABINE PO Take 1,650 mg by mouth 2 (two) times a day (Patient not taking: Reported on 3/17/2023)     • cyclobenzaprine (FLEXERIL) 5 mg tablet Take 1 tablet (5 mg total) by mouth 3 (three) times a day as needed for muscle spasms (Patient not taking: Reported on 6/21/2023) 90 tablet 2   • docusate sodium (COLACE) 100 mg capsule Take 1 capsule (100 mg total) by mouth 2 (two) times a day for 15 days 30 capsule 0   • fluorouracil (ADRUCIL) 5 GM/100ML SOLN  (Patient not taking: Reported on 5/31/2023)     • oxybutynin (DITROPAN) 5 mg tablet TAKE 1 TABLET (5 MG TOTAL) BY MOUTH EVERY 6 (SIX) HOURS AS NEEDED (BLADDER SPASMS) (Patient not taking: Reported on 6/21/2023) 30 tablet 0   • Potassium Gluconate 550 MG TABS Take 1 tablet by mouth in the morning (Patient not taking: Reported on 6/5/2023)     • predniSONE 10 mg tablet TAKE 2 TABLETS BY MOUTH EVERY DAY (Patient not taking: Reported on 6/21/2023) 60 tablet 1   • tamsulosin (FLOMAX) 0.4 mg Take 1 capsule (0.4 mg total) by mouth daily with dinner (Patient not taking: Reported on 4/6/2023) 15 capsule 0     Current Facility-Administered Medications on File Prior to Visit   Medication Dose Route Frequency Provider Last Rate Last Admin   • alteplase (CATHFLO) injection 2 mg  2 mg Intracatheter Q1MIN PRN Sarkis Thomas MD           Allergies   Allergen Reactions   • Oxaliplatin Shortness Of Breath     Reactions occurred with 2nd and 3rd infusions (about 1-3 hours from initiation of infusion) and required treatment with steroids and antihistamines. Please refer to allergy note on 2/7/2023 for detailed description of her reactions.        PHYSICAL EXAM:  /82 (BP Location: Right arm, Patient Position: Sitting, Cuff Size: Standard)   Pulse 65   Ht 5' 2" (1.575 m)   Wt 69.9 kg (154 lb 3.2 oz)   SpO2 98%   BMI 28.20 kg/m²   GENERAL: NAD, AAO  HEENT: anicteric, OP clear, MMM  ABDOMEN: soft with ostomy with formed brown stool, normoactive BS, no hepatomegaly, spleen not palpable  EXTREMITIES: no edema  SKIN: no rashes, no palmar erythema, no spider angiomata   NEURO: normal gait, no tremor, no asterixis     LABS/RADIOLOGY/ENDOSCOPY:  Lab Results   Component Value Date    WBC 8.31 06/30/2023    HGB 11.7 06/30/2023    HCT 36.9 06/30/2023     06/30/2023    GLUF 84 06/30/2023    BUN 37 (H) 06/30/2023    CREATININE 0.86 06/30/2023    K 3.6 06/30/2023     (H) 06/30/2023    CO2 25 06/30/2023    ALKPHOS 243 (H) 06/30/2023    ALT 84 (H) 06/30/2023    AST 20 06/30/2023    CALCIUM 9.2 06/30/2023    EGFR 76 06/30/2023    INR 0.92 06/30/2023    PTT 25 02/11/2023     BLANCA-  AMA-  ASMA-  A1AT MM  IgG4 wnl  HAV IgG+  HBsAg-  HBcAb-   HCV Ab-   AFP wnl  RPR-  QTB-  ACE 75    Liver biopsy (5/9/2023)  A. Liver, lesion:  - Mild steatosis. - Sinusoidal dilatation with perisinusoidal fibrosis. - Negative for metastatic carcinoma.     Note: The cores are thin, containing portions of only 4 portal tracts. At least 10 portal areas are needed for adequate evaluation of fibrosis and general portal characteristics. In addition to mild steatosis, focal non-necrotizing granulomas is present. In this biopsy, there is no significant duct-centric inflammation, florid duct lesions, duct loss, cholestasis seen. The current findings are nonspecific and the differential diagnosis includes autoimmune related injury, infectious etiology, medication, outflow obstruction among others. Clinical and laboratory (Including AMA) correlation is suggested for definitive diagnosis.      The current findings can't explain clinical impression of a mass lesion, clinical and imaging correlation is suggested.     MELD 3.0: 8 at 6/30/2023  8:35 AM  MELD-Na: 6 at 6/30/2023  8:35 AM  Calculated from:  Serum Creatinine: 0.86 mg/dL (Using min of 1 mg/dL) at 6/30/2023  8:35 AM  Serum Sodium: 139 mmol/L (Using max of 137 mmol/L) at 6/30/2023  8:35 AM  Total Bilirubin: 0.66 mg/dL (Using min of 1 mg/dL) at 6/30/2023  8:35 AM  Serum Albumin: 3.4 g/dL at 6/30/2023  8:35 AM  INR(ratio): 0.92 (Using min of 1) at 6/30/2023  8:35 AM  Age at listing (hypothetical): 57 years  Sex: Female at 6/30/2023  8:35 AM

## 2023-07-07 ENCOUNTER — DOCUMENTATION (OUTPATIENT)
Dept: HEMATOLOGY ONCOLOGY | Facility: CLINIC | Age: 58
End: 2023-07-07

## 2023-07-07 ENCOUNTER — ANESTHESIA EVENT (OUTPATIENT)
Dept: PERIOP | Facility: AMBULARY SURGERY CENTER | Age: 58
End: 2023-07-07
Payer: COMMERCIAL

## 2023-07-07 ENCOUNTER — PATIENT OUTREACH (OUTPATIENT)
Dept: HEMATOLOGY ONCOLOGY | Facility: CLINIC | Age: 58
End: 2023-07-07

## 2023-07-07 DIAGNOSIS — C20 RECTAL CANCER (HCC): ICD-10-CM

## 2023-07-07 DIAGNOSIS — Z51.5 PALLIATIVE CARE PATIENT: ICD-10-CM

## 2023-07-07 DIAGNOSIS — G89.3 CANCER RELATED PAIN: ICD-10-CM

## 2023-07-07 DIAGNOSIS — C18.7 MALIGNANT NEOPLASM OF SIGMOID COLON (HCC): ICD-10-CM

## 2023-07-07 LAB — BACTERIA UR CULT: NORMAL

## 2023-07-07 NOTE — PROGRESS NOTES
Rescheduled pts MRI, spoke with Maty Cronin and confirmed the new date, time and location of the scheduled MRI.  He understood, and was thankful for the call

## 2023-07-07 NOTE — PROGRESS NOTES
In basket [facility], to be presented by Dr Lydia Hill

## 2023-07-09 RX ORDER — OXYCODONE HYDROCHLORIDE 5 MG/1
5-10 TABLET ORAL EVERY 4 HOURS PRN
Qty: 180 TABLET | Refills: 0 | Status: SHIPPED | OUTPATIENT
Start: 2023-07-09

## 2023-07-10 ENCOUNTER — CONSULT (OUTPATIENT)
Dept: ANESTHESIOLOGY | Facility: CLINIC | Age: 58
End: 2023-07-10
Payer: COMMERCIAL

## 2023-07-10 VITALS
DIASTOLIC BLOOD PRESSURE: 87 MMHG | BODY MASS INDEX: 27.98 KG/M2 | SYSTOLIC BLOOD PRESSURE: 164 MMHG | OXYGEN SATURATION: 98 % | TEMPERATURE: 98 F | HEART RATE: 87 BPM | WEIGHT: 153 LBS | RESPIRATION RATE: 20 BRPM

## 2023-07-10 DIAGNOSIS — Z51.5 PALLIATIVE CARE PATIENT: ICD-10-CM

## 2023-07-10 DIAGNOSIS — R31.0 GROSS HEMATURIA: ICD-10-CM

## 2023-07-10 DIAGNOSIS — C20 RECTAL CANCER (HCC): ICD-10-CM

## 2023-07-10 DIAGNOSIS — D69.6 THROMBOCYTOPENIA (HCC): ICD-10-CM

## 2023-07-10 DIAGNOSIS — I10 PRIMARY HYPERTENSION: ICD-10-CM

## 2023-07-10 DIAGNOSIS — I10 HYPERTENSION, UNSPECIFIED TYPE: ICD-10-CM

## 2023-07-10 DIAGNOSIS — Z95.828 PORT-A-CATH IN PLACE: ICD-10-CM

## 2023-07-10 DIAGNOSIS — D86.9 SARCOIDOSIS: ICD-10-CM

## 2023-07-10 DIAGNOSIS — N20.0 KIDNEY CALCULI: ICD-10-CM

## 2023-07-10 DIAGNOSIS — C18.7 MALIGNANT NEOPLASM OF SIGMOID COLON (HCC): Primary | ICD-10-CM

## 2023-07-10 PROCEDURE — 99245 OFF/OP CONSLTJ NEW/EST HI 55: CPT | Performed by: NURSE PRACTITIONER

## 2023-07-10 RX ORDER — AMLODIPINE BESYLATE 5 MG/1
5 TABLET ORAL DAILY
Qty: 30 TABLET | Refills: 0 | Status: SHIPPED | OUTPATIENT
Start: 2023-07-10 | End: 2023-07-14 | Stop reason: SDUPTHER

## 2023-07-10 NOTE — PROGRESS NOTES
[address] [facility])  CONSULT: Optimization prior to surgery    Assessment/Plan:  · 61-year-old female referred to [facility] for presurgical optimization  · Consult concerns: need for optimization  · She is scheduled on 8/17/2023  Case: [id number] Date/Time: 08/17/23 0730   Procedure: RESECTION COLON LOW ANTERIOR LAPAROSCOPIC WITH ROBOTICS (Abdomen)   Anesthesia type: General   Diagnosis: Rectal cancer ([address]) [C20]   Pre-op diagnosis: Rectal cancer ([address]) [C20]   Location: BE OR ROOM 14 / [address] [facility]   Surgeons: Brandy Santos MD   No problem-specific Assessment & Plan notes found for this encounter. SURGERY TO DO:    · Recommend PC- via letter (patient recently seen)   · scheduled [facility] 7.14.23 -emphasis on blood pressure management  · SURGERY NURSE PHONE CALL TBS CLOSER TO SURGERY   · BEST protocol STARTED   BEST   Breathing- instructed to exercise lungs prior to surgery via IS  Eat- discussed increasing protein intake prior to surgery   Sleep/stress- encouraged 8-10 sleep @ night, stress reduction, avoid sick contacts and handwashing   · Train- encouraged to remain active      Last anesthesia  No concerns  Problem List Items Addressed This Visit        Digestive    Malignant neoplasm of sigmoid colon ([address]) - Primary  · Seen for surgical optimization  · I recommended pulmonary clearance. Fortunately, patient was just seen by her pulmonologist Dr. Lucero Garcia. Would consider faxing a letter for recommendations reason: sarcoidosis  · I recommended medical clearance. I scheduled this for 7/14/2023. Reason: Patient has not been taking her blood pressure medication. Hypertensive in [facility] today at 164/87. When discussed with patient BP she described stopping medication on her own. Says her blood pressure is constantly elevated. Says she is constant pain. And constantly stressed. Unsure if she even had a refill of the amlodipine.   I reordered her a 30-day supply to get started and then she is to take over management with her PCP. Patient AWARE   · Best protocol with emphasis on incentive spirometer. · PATS reviewed-stable        Cardiovascular and Mediastinum    Primary hypertension  · Unstable  · BP today 164/87. · Patient describes stopping her blood pressure medication on her own. · Patient describes always having a high blood pressure  · Patient describes always being in pain  · Patient describes high levels of stress. · I recommended medical clearance to proceed to surgery. I scheduled this for the patient on 7/14/2023. Hematopoietic and Hemostatic    Thrombocytopenia ([address])  · Assume secondary to chemo  · Will await latest blood work  · We will need platelets to be above 90       Genitourinary    Gross hematuria    Kidney calculi  · Unstable. · Patient describes constantly being in pain in her genital area. · Assumed to be related to her urethral stent. · She is scheduled for cystoscopy and stent change upcoming With her urologist Dr. Bryce Latif on 7/21/2023       Other    Port-A-Cath in place    Palliative care patient  · Stable  · Follows with Dr. Nayeli Garcia  · Recommend inpatient consult to palliative care once admitted to the hospital   Other Visit Diagnoses     Rectal cancer [facility])      · Seen for surgical optimization    SURGERY TO DO:    · Recommend PC- via letter (patient recently seen)   · scheduled [address] 7.14.23 -emphasis on blood pressure management  · SURGERY NURSE PHONE CALL TBS CLOSER TO SURGERY   · BEST protocol STARTED   BEST   Breathing- instructed to exercise lungs prior to surgery via IS  Eat- discussed increasing protein intake prior to surgery   Sleep/stress- encouraged 8-10 sleep @ night, stress reduction, avoid sick contacts and handwashing   · Train- encouraged to remain active                     Subjective:      Patient ID: Lioenl Hoffman is a 56 y.o. female who was referred to [facility] for surgical optimization.   Patient has a history of rectal cancer. She is status post laparoscopic diverting colostomy with liver biopsy on 12/1/2022. She has underwent her chemo -radiation therapy post surgery. Chemo-radiation was completed on May 25, 2023. She is now electing to have a possible laparoscopic versus open low anterior resection including a colostomy reversal and likely need for diverting for ileostomy. I met patient in [facility]. She arrived with a male "friend". Did not clarify his role at the time. Both were pleasant and a pleasure to care for. Patient walked independently. She appeared fairly well physically, but, emotionally upset. She admits to being in constant pain between her history of kidney stones, stents placement, and colon CA. She is currently having a lot of genital pain. Assumed to be related to her urethral stent. She is scheduled to have an upcoming cystoscopy with stent exchange with her urologist Dr. Conrad Connell. Hoping this will improve pain. Patient did appear emotional and overwhelmed with her health illnesses. Describes not being well for almost 2 years. States it is a new norm for her to be in constant pain. She is tearful on exam.      She does have a history of sarcoidosis. She follows with pulmonary. She was recently seen by Dr. Svetlana Madden. I did recommend faxing a letter over for surgical recommendations. I will message the office in regards to this. Breathing is stable today. She describes chronic shortness of breath for almost 2 years. States it can happen at rest or with activity where she just feels like she cannot take a deep breath. Assume related to her sarcoidosis. Today her blood pressure was mildly elevated at 164/86. Patient states her blood pressure is always high. Patient denies taking her medication amlodipine. States she stopped it on her own. She feels it does not work. Admits to always being in pain. Admits to high levels of stress. States feeling bad is the new norm for her.   I did recommend medical clearance. I scheduled her an appointment with her PCP on 7/14/2023 to discuss her blood pressure management. She will need this prior to proceeding with surgery. She admits to having surgery in the past.  She denies any problems WITH anesthesia in the past.  She also stated she was unsure if she even had blood pressure medication at home. The male counterpart that was with her today was actively on the phone with pharmacy to check to see if they had any refills available at the pharmacy. The male counterpart with her today states that he believes she was on blood pressure medication. I reordered a one-time supply of her amlodipine to get started until she sees her family physician. Patient was educated on blood pressure medication. Patient was also instructed to follow-up with her PCP for further blood pressure management and treatment. Patient agrees. No refills supplied. Recent blood work is fairly stable. She still needs to have more blood work coming up. Await those results for further needs. We need to check her platelets. She has a history of thrombocytopenia. Assume related to her chemoradiation therapy. Now that this is over would hope for improvement. I did explain to the patient to proceed to surgery I would recommend a pulmonary clearance, medical clearance, and repeating blood work. Patient is aware. I was glad to hear that she managed her chemoradiation treatments well. She Feels that it did not harm her or debilitate her. Describes her current energy level of 3-4/5. Although she is not at her best (5/5) she cannot really remember the last time she was due to her ongoing health illnesses. She has good days and bad days. Mostly bad days. But she does feel the chemoradiation did set her back. We started her on the best protocol with emphasis on lung exercises. Educated her on the risk of postop pneumonia. Patient agrees to do lung exercises. Patient agrees to increase protein in her diet. She agrees to start taking her blood pressure medication. As always we discussed having your BEST surgery, and BEST recovery. Surgery goals reviewed today. Breathing exercises   Patient was encouraged to begin lung exercises today. This could be accomplished through deep breathing and cough exercises. Patient was taught how to use an incentive spirometer. Eating/nutrition   Encouraged patient to increase oral protein intake prior to surgery. This can be accomplished by consuming chicken, fish, tuna fish, cottage cheese, cheese, eggs, [place] yogurt, and protein shakes as needed. I encouraged use of protein shakes such ENLIVE. I also recommended making your own protein shakes with protein powder. Sleep/Stress management  Patient was encouraged to rest their body prior to surgery. Encouraged attempting to get 8 hours of sleep at night. Avoid stress. Avoid sick contacts. Encouraged to find a relaxing hobby such as reading, meditation, listening to music. Training exercises  Patient was encouraged to remain active as possible. Today bilateral lower extremity generic exercises were taught for muscle strengthening and balance. All exercises to be done sitting down. The following portions of the patient's history were reviewed and updated as appropriate: current medications, past family history, past medical history, past social history, past surgical history and problem list.    Review of Systems   Constitutional: Negative for chills and fever. HENT: Negative for sinus pain and sore throat. Respiratory: Positive for shortness of breath. Cardiovascular: Negative for chest pain. Gastrointestinal: Negative for diarrhea, nausea, rectal pain and vomiting. Genitourinary: Positive for difficulty urinating. Skin: Negative for color change, pallor, rash and wound. Neurological: Negative for dizziness and headaches.    Psychiatric/Behavioral: Negative for confusion and hallucinations. Objective:      /87 (BP Location: Right arm, Patient Position: Sitting)   Pulse 87   Temp 98 °F (36.7 °C)   Resp 20   Wt 69.4 kg (153 lb)   SpO2 98%   BMI 27.98 kg/m²          Physical Exam  HENT:      Head: Normocephalic. Mouth/Throat:      Mouth: Mucous membranes are moist.   Pulmonary:      Effort: Pulmonary effort is normal.   Musculoskeletal:         General: Normal range of motion. Cervical back: Normal range of motion. Neurological:      General: No focal deficit present. Mental Status: She is alert and oriented to person, place, and time. Mental status is at baseline. Psychiatric:         Mood and Affect: Mood normal.         Behavior: Behavior normal.         Thought Content:  Thought content normal.         Judgment: Judgment normal.

## 2023-07-10 NOTE — PATIENT INSTRUCTIONS
Met with pt in [facility] with relative Batsheva Quevedo. Patient & I talked about B.E[facility] Surgery  Breathing exercises    Patient was encouraged to begin lung exercises today. Incentive Spirometer reviewed, start using today, 10 breaths 4x a day leading up to surgery  Eating/nutrition   Encouraged patient to increase oral protein intake prior to surgery. This can be accomplished by consuming chicken, fish, tuna fish, cottage cheese, cheese, eggs, [place] yogurt, and protein shakes as needed. Refer to handout given at [facility] appt. Sleep/Stress management  Patient was encouraged to rest their body prior to surgery. Encouraged attempting to get 8 hours of sleep at night. Training exercises  Patient was encouraged to remain active as possible.      Nutrition Assessment Score: 14  METS: 9.25

## 2023-07-11 ENCOUNTER — APPOINTMENT (OUTPATIENT)
Dept: LAB | Facility: AMBULARY SURGERY CENTER | Age: 58
End: 2023-07-11
Payer: COMMERCIAL

## 2023-07-11 DIAGNOSIS — D86.89 HEPATIC GRANULOMA ASSOCIATED WITH SARCOIDOSIS: ICD-10-CM

## 2023-07-11 DIAGNOSIS — R79.89 ABNORMAL TSH: ICD-10-CM

## 2023-07-11 LAB
CRP SERPL QL: 3.6 MG/L
ERYTHROCYTE [SEDIMENTATION RATE] IN BLOOD: 23 MM/HOUR (ref 0–29)
INR PPP: 0.89 (ref 0.84–1.19)
PROTHROMBIN TIME: 12.2 SECONDS (ref 11.6–14.5)
T3 SERPL-MCNC: 0.8 NG/ML
T4 FREE SERPL-MCNC: 0.75 NG/DL (ref 0.61–1.12)
TSH SERPL DL<=0.05 MIU/L-ACNC: 0.25 UIU/ML (ref 0.45–4.5)

## 2023-07-11 PROCEDURE — 84439 ASSAY OF FREE THYROXINE: CPT

## 2023-07-11 PROCEDURE — 84443 ASSAY THYROID STIM HORMONE: CPT

## 2023-07-11 PROCEDURE — 82164 ANGIOTENSIN I ENZYME TEST: CPT

## 2023-07-11 PROCEDURE — 36415 COLL VENOUS BLD VENIPUNCTURE: CPT

## 2023-07-11 PROCEDURE — 84480 ASSAY TRIIODOTHYRONINE (T3): CPT

## 2023-07-11 PROCEDURE — 86140 C-REACTIVE PROTEIN: CPT

## 2023-07-11 PROCEDURE — 85652 RBC SED RATE AUTOMATED: CPT

## 2023-07-11 PROCEDURE — 85610 PROTHROMBIN TIME: CPT

## 2023-07-12 LAB — ACE SERPL-CCNC: 39 U/L (ref 14–82)

## 2023-07-13 ENCOUNTER — DOCUMENTATION (OUTPATIENT)
Dept: HEMATOLOGY ONCOLOGY | Facility: CLINIC | Age: 58
End: 2023-07-13

## 2023-07-13 DIAGNOSIS — D86.9 SARCOIDOSIS: ICD-10-CM

## 2023-07-13 RX ORDER — PREDNISONE 10 MG/1
TABLET ORAL
Qty: 60 TABLET | Refills: 1 | OUTPATIENT
Start: 2023-07-13

## 2023-07-13 NOTE — PROGRESS NOTES
In-basket message received from [facility] to add patient to [place] [address] on 7/27/2023. Chart reviewed and prep started. Pending MRI results. I will follow up.

## 2023-07-14 ENCOUNTER — CONSULT (OUTPATIENT)
Dept: INTERNAL MEDICINE CLINIC | Facility: CLINIC | Age: 58
End: 2023-07-14
Payer: COMMERCIAL

## 2023-07-14 VITALS
OXYGEN SATURATION: 98 % | SYSTOLIC BLOOD PRESSURE: 148 MMHG | WEIGHT: 155.2 LBS | BODY MASS INDEX: 28.56 KG/M2 | HEIGHT: 62 IN | DIASTOLIC BLOOD PRESSURE: 78 MMHG | HEART RATE: 91 BPM

## 2023-07-14 DIAGNOSIS — C20 RECTAL CANCER (HCC): Primary | ICD-10-CM

## 2023-07-14 DIAGNOSIS — Z13.6 SCREENING FOR CARDIOVASCULAR CONDITION: ICD-10-CM

## 2023-07-14 DIAGNOSIS — I10 HYPERTENSION, UNSPECIFIED TYPE: ICD-10-CM

## 2023-07-14 PROCEDURE — 99214 OFFICE O/P EST MOD 30 MIN: CPT | Performed by: INTERNAL MEDICINE

## 2023-07-14 PROCEDURE — 93000 ELECTROCARDIOGRAM COMPLETE: CPT | Performed by: INTERNAL MEDICINE

## 2023-07-14 RX ORDER — AMLODIPINE BESYLATE 5 MG/1
5 TABLET ORAL DAILY
Qty: 30 TABLET | Refills: 0 | Status: SHIPPED | OUTPATIENT
Start: 2023-07-14 | End: 2023-07-19

## 2023-07-14 NOTE — ASSESSMENT & PLAN NOTE
- Patient is outside of the 30-day window for her H&P to document medical clearance. The patient will be rescheduled to follow-up with her PCP within the next 1 to 2 weeks for surgical clearance.

## 2023-07-14 NOTE — ASSESSMENT & PLAN NOTE
-Patient's blood pressure has improved on 5 mg of amlodipine daily. She is only been on this dose for a few days. Her blood pressure still slightly above goal but we will maintain her on her current dose for now. Dose can be increased to 10 mg daily if she is not at goal during her next visit for preoperative evaluation.

## 2023-07-14 NOTE — PATIENT INSTRUCTIONS
-Schedule f/u in 1-2 weeks with Dr. Kristofer Franklin in 1-2 weeks   -Please go for blood work in after 7/17  -Continue your current dose of Amlodipine

## 2023-07-14 NOTE — PROGRESS NOTES
Name: Codie Newton      : 1965      MRN: [id number]  Encounter Provider: Valentine Celeste MD  Encounter Date: 2023   Enc[facility]     1. Rectal cancer [facility])  Assessment & Plan:  - Patient is outside of the 30-day window for her H&P to document medical clearance. The patient will be rescheduled to follow-up with her PCP within the next 1 to 2 weeks for surgical clearance. Orders:  -     Comprehensive metabolic panel; Future  -     CBC and differential; Future    2. Hypertension, unspecified type  Assessment & Plan:  -Patient's blood pressure has improved on 5 mg of amlodipine daily. She is only been on this dose for a few days. Her blood pressure still slightly above goal but we will maintain her on her current dose for now. Dose can be increased to 10 mg daily if she is not at goal during her next visit for preoperative evaluation. Orders:  -     amLODIPine (NORVASC) 5 mg tablet; Take 1 tablet (5 mg total) by mouth daily    3. Screening for cardiovascular condition  -     POCT ECG           Subjective      HPI  Patient initially presented today for preoperative clearance for her upcoming colon resection. Unfortunately this visit was scheduled outside of the 30-day window. Of note, the patient was evaluated by our surgical optimization Center and noted to have elevated blood pressure. She was started on amlodipine 5 mg daily on 7/10 and told to follow-up in our office.     ROS as per [facility]    Current Outpatient Medications on File Prior to Visit   Medication Sig   • acetaminophen (TYLENOL) 500 mg tablet Take 2 tablets (1,000 mg total) by mouth 3 (three) times a day   • cyanocobalamin (VITAMIN B-12) 1000 MCG tablet Take 1,000 mcg by mouth daily   • cyclobenzaprine (FLEXERIL) 5 mg tablet Take 1 tablet (5 mg total) by mouth 3 (three) times a day as needed for muscle spasms   • predniSONE 10 mg tablet TAKE 2 TABLETS BY MOUTH EVERY DAY   • predniSONE 10 mg tablet Take 4 tablets (40 mg total) by mouth daily Please take for 4 weeks and then decrease by 5 mg increments every 2 weeks. (Patient taking differently: Take 30 mg by mouth daily Please take for 4 weeks and then decrease by 5 mg increments every 2 weeks.  -- Taking 20mg and then 10mg - titrating)   • ursodiol (ACTIGALL) 300 mg capsule Take 3 capsules (900 mg total) by mouth in the morning   • CAPECITABINE PO Take 1,650 mg by mouth 2 (two) times a day (Patient not taking: Reported on 7/10/2023)   • docusate sodium (COLACE) 100 mg capsule Take 1 capsule (100 mg total) by mouth 2 (two) times a day for 15 days   • fluorouracil (ADRUCIL) 5 GM/100ML SOLN  (Patient not taking: Reported on 5/31/2023)   • oxyCODONE (Roxicodone) 5 immediate release tablet Take 1-2 tablets (5-10 mg total) by mouth every 4 (four) hours as needed for moderate pain or severe pain Max Daily Amount: 60 mg   • potassium chloride (MICRO-K) 10 MEQ CR capsule TAKE 4 CAPSULES (40 MEQ TOTAL) BY MOUTH 2 (TWO) TIMES A DAY   • Potassium Gluconate 550 MG TABS Take 1 tablet by mouth in the morning (Patient not taking: Reported on 7/14/2023)       Objective     /78 Comment: Manual BP right arm  Pulse 91   Ht 5' 2" (1.575 m)   Wt 70.4 kg (155 lb 3.2 oz)   SpO2 98%   BMI 28.39 kg/m²     BP Readings from Last 3 Encounters:   07/14/23 148/78   07/10/23 164/87   07/06/23 160/82        Wt Readings from Last 3 Encounters:   07/14/23 70.4 kg (155 lb 3.2 oz)   07/10/23 69.4 kg (153 lb)   07/06/23 69.9 kg (154 lb 3.2 oz)       Physical Exam    General: NAD, Alert and oriented x3   HEENT: NCAT, EOMI, normal conjunctiva  Cardiovascular: RRR, normal S1 and S2, no m/r/g  Pulmonary: Normal respiratory effort, no wheezes, rales or rhonchi  Extremities: No lower extremity edema  Skin: Normal skin color, no rashes     Eusebio Cristin Moulds, MD

## 2023-07-19 ENCOUNTER — OFFICE VISIT (OUTPATIENT)
Dept: PALLIATIVE MEDICINE | Facility: CLINIC | Age: 58
End: 2023-07-19
Payer: COMMERCIAL

## 2023-07-19 ENCOUNTER — HOSPITAL ENCOUNTER (OUTPATIENT)
Dept: RADIOLOGY | Age: 58
Discharge: HOME | End: 2023-07-19
Payer: COMMERCIAL

## 2023-07-19 ENCOUNTER — CONSULT (OUTPATIENT)
Dept: INTERNAL MEDICINE CLINIC | Facility: CLINIC | Age: 58
End: 2023-07-19
Payer: COMMERCIAL

## 2023-07-19 VITALS
HEART RATE: 80 BPM | DIASTOLIC BLOOD PRESSURE: 80 MMHG | SYSTOLIC BLOOD PRESSURE: 120 MMHG | BODY MASS INDEX: 28.52 KG/M2 | RESPIRATION RATE: 15 BRPM | HEIGHT: 62 IN | OXYGEN SATURATION: 98 % | WEIGHT: 155 LBS | TEMPERATURE: 97 F

## 2023-07-19 VITALS
DIASTOLIC BLOOD PRESSURE: 90 MMHG | SYSTOLIC BLOOD PRESSURE: 152 MMHG | HEIGHT: 62 IN | WEIGHT: 155.2 LBS | OXYGEN SATURATION: 99 % | HEART RATE: 85 BPM | BODY MASS INDEX: 28.56 KG/M2

## 2023-07-19 DIAGNOSIS — K59.03 DRUG-INDUCED CONSTIPATION: ICD-10-CM

## 2023-07-19 DIAGNOSIS — I10 PRIMARY HYPERTENSION: Primary | ICD-10-CM

## 2023-07-19 DIAGNOSIS — C18.7 MALIGNANT NEOPLASM OF SIGMOID COLON (HCC): Primary | ICD-10-CM

## 2023-07-19 DIAGNOSIS — Z01.818 PREOP EXAMINATION: ICD-10-CM

## 2023-07-19 DIAGNOSIS — C18.7 MALIGNANT NEOPLASM OF SIGMOID COLON (HCC): ICD-10-CM

## 2023-07-19 DIAGNOSIS — G89.3 CANCER RELATED PAIN: ICD-10-CM

## 2023-07-19 DIAGNOSIS — R79.89 ABNORMAL TSH: ICD-10-CM

## 2023-07-19 DIAGNOSIS — Z51.5 PALLIATIVE CARE PATIENT: ICD-10-CM

## 2023-07-19 DIAGNOSIS — D86.9 SARCOIDOSIS: ICD-10-CM

## 2023-07-19 PROBLEM — Z13.6 SCREENING FOR CARDIOVASCULAR CONDITION: Status: RESOLVED | Noted: 2023-07-14 | Resolved: 2023-07-19

## 2023-07-19 PROCEDURE — G1004 CDSM NDSC: HCPCS

## 2023-07-19 PROCEDURE — 99213 OFFICE O/P EST LOW 20 MIN: CPT | Performed by: INTERNAL MEDICINE

## 2023-07-19 PROCEDURE — 99214 OFFICE O/P EST MOD 30 MIN: CPT | Performed by: GENERAL ACUTE CARE HOSPITAL

## 2023-07-19 PROCEDURE — 72195 MRI PELVIS W/O DYE: CPT

## 2023-07-19 RX ORDER — CYCLOBENZAPRINE HCL 10 MG
10 TABLET ORAL 3 TIMES DAILY PRN
COMMUNITY

## 2023-07-19 RX ORDER — AMLODIPINE BESYLATE 10 MG/1
10 TABLET ORAL DAILY
Qty: 90 TABLET | Refills: 3 | Status: SHIPPED | OUTPATIENT
Start: 2023-07-19

## 2023-07-19 NOTE — ASSESSMENT & PLAN NOTE
Likely 2/2 prednisone use  No obvious hyperthyroidism symptoms  Will recheck after she is off prednisone

## 2023-07-19 NOTE — PROGRESS NOTES
Name: Iveth Messina      : 1965      MRN: [id number]  Encounter Provider: Nuris Sands MD  Encounter Date: 2023   Encounter department: [facility]    Assessment & Plan     1. Primary hypertension  Assessment & Plan:  BP still elevated  Increase amlodipine to 10mg    Orders:  -     amLODIPine (NORVASC) 10 mg tablet; Take 1 tablet (10 mg total) by mouth daily    2. Preop examination  Assessment & Plan:  Pt is able to complete 4 METS without trouble  Based on NSQIP, perioperative cardiac complication 1% which is standard. EKG done 2 days ago unremarkable  No further workups needed prior to surgery. Pt is on prednisone for sarcoidosis. This will increase perioperative infection risk. Need close monitor. BP mildly elevated today. Will icnrease amlodipine to 10mg. This does not affect the decision of surgery. Noticed abnormal thyroid lab. This is likely 2/2 prednisone use. 3. Abnormal TSH  Assessment & Plan:  Likely 2/2 prednisone use  No obvious hyperthyroidism symptoms  Will recheck after she is off prednisone           Subjective      HPI   Here for pre-op tanner  Still gets gross hematuria, not as much  Still has pain in privated area on oxycodone  Scheduled for resection colon   Chronic SOB improving with prednisone  Denies chest pain, palpitation. Able to climb up 1 flight of stairs without much trouble  Yesterday did 1-hour vigorous housework and cooking without trouble  Review of Systems   Respiratory: Positive for shortness of breath. Cardiovascular: Positive for palpitations.        Current Outpatient Medications on File Prior to Visit   Medication Sig   • acetaminophen (TYLENOL) 500 mg tablet Take 2 tablets (1,000 mg total) by mouth 3 (three) times a day   • cyanocobalamin (VITAMIN B-12) 1000 MCG tablet Take 1,000 mcg by mouth daily   • oxyCODONE (Roxicodone) 5 immediate release tablet Take 1-2 tablets (5-10 mg total) by mouth every 4 (four) hours as needed for moderate pain or severe pain Max Daily Amount: 60 mg   • predniSONE 10 mg tablet TAKE 2 TABLETS BY MOUTH EVERY DAY   • predniSONE 10 mg tablet Take 4 tablets (40 mg total) by mouth daily Please take for 4 weeks and then decrease by 5 mg increments every 2 weeks. (Patient taking differently: Take 30 mg by mouth daily Please take for 4 weeks and then decrease by 5 mg increments every 2 weeks. -- Taking 20mg and then 10mg - titrating)   • ursodiol (ACTIGALL) 300 mg capsule Take 3 capsules (900 mg total) by mouth in the morning   • [DISCONTINUED] amLODIPine (NORVASC) 5 mg tablet Take 1 tablet (5 mg total) by mouth daily   • CAPECITABINE PO Take 1,650 mg by mouth 2 (two) times a day (Patient not taking: Reported on 7/10/2023)   • docusate sodium (COLACE) 100 mg capsule Take 1 capsule (100 mg total) by mouth 2 (two) times a day for 15 days   • fluorouracil (ADRUCIL) 5 GM/100ML SOLN  (Patient not taking: Reported on 5/31/2023)   • potassium chloride (MICRO-K) 10 MEQ CR capsule TAKE 4 CAPSULES (40 MEQ TOTAL) BY MOUTH 2 (TWO) TIMES A DAY   • Potassium Gluconate 550 MG TABS Take 1 tablet by mouth in the morning (Patient not taking: Reported on 7/14/2023)   • [DISCONTINUED] cyclobenzaprine (FLEXERIL) 5 mg tablet Take 1 tablet (5 mg total) by mouth 3 (three) times a day as needed for muscle spasms (Patient not taking: Reported on 7/19/2023)       Objective     /90 (BP Location: Left arm, Patient Position: Sitting, Cuff Size: Standard)   Pulse 85   Ht 5' 2" (1.575 m)   Wt 70.4 kg (155 lb 3.2 oz)   SpO2 99%   BMI 28.39 kg/m²     Physical Exam  Constitutional:       General: She is not in acute distress. Appearance: She is well-developed. HENT:      Head: Normocephalic and atraumatic. Right Ear: External ear normal.      Left Ear: External ear normal.   Eyes:      General: No scleral icterus. Conjunctiva/sclera: Conjunctivae normal.   Neck:      Thyroid: No thyromegaly.       Trachea: No tracheal deviation. Cardiovascular:      Rate and Rhythm: Normal rate and regular rhythm. Heart sounds: Normal heart sounds. Pulmonary:      Effort: Pulmonary effort is normal. No respiratory distress. Breath sounds: Normal breath sounds. No wheezing or rales. Abdominal:      General: Bowel sounds are normal.      Palpations: Abdomen is soft. Tenderness: There is no abdominal tenderness. There is no guarding or rebound. Musculoskeletal:      Cervical back: Normal range of motion and neck supple. Lymphadenopathy:      Cervical: No cervical adenopathy. Neurological:      Mental Status: She is alert and oriented to person, place, and time. Psychiatric:         Behavior: Behavior normal.         Thought Content:  Thought content normal.         Judgment: Judgment normal.       Nyla Villeda MD

## 2023-07-19 NOTE — PATIENT INSTRUCTIONS
It was good to see you today. Thank you for coming in. Continue current medications. I hope the surgery goes well! If you need Palliative assistance in the hospital ask your team to consult us. Return in about 5 weeks (around 8/23/2023). Call us for refills on medications that we supply, as needed. If something changes and you need to come in sooner, please call our office. PRESCRIPTION REFILL REMINDER:  All medication refills should be requested prior to [facility] on Friday. Any refill requests after noon on Friday would be addressed the following Monday. MEDICATION SAFETY ISSUES:  Do not drive under the influence of narcotics (including opioids), watch for adverse effects including confusion / altered mental status / respiratory depression (slowed breathing), keep medications stored in a safe/locked environment, do not use alcohol while opioids or other narcotics are in your system.

## 2023-07-19 NOTE — PROGRESS NOTES
Follow-up with Palliative and Supportive Care  Lebron Posadas 56 y.o. female [id number]    ASSESSMENT & PLAN:  1. Malignant neoplasm of sigmoid colon ([address])    2. Sarcoidosis    3. Cancer related pain    4. Drug-induced constipation    5. Palliative care patient          • Continue disease-directed cares. Plan is for surgery next month. • Patient reports recurrent episodes of hematuria + dysuria. She in unsure how ling she might need ureteral stents. • ACP: Patient has not completed any advanced healthcare directives. Advanced directive counseling given. She was provided a copy of the [facility] Advanced Directive along with counseling in a prior visit. She had expressed a desire to have her ex- Meme Acuna be listed as a surrogate healthcare decision-maker. She is not ready to complete the advanced directive - she will let us know when she is ready. • Patient reports her cancer-related pain (and non-cancer abdominal pain) is well-controlled on her current regimen. o Continue cyclobenzaprine PRN. o Continue oxyIR 5-10mg q4h PRN moderate-severe pain. She is using responsibly. o Recommend Tylenol for adjunct analgesia. • Counseled on bowel regimen for drug-induced constipation. No recent diarrhea. • Refills processed prior to today's visit. • Reviewed notes (PCP, Radiation Oncology, Gastroenterology, Acute Pain), labs (7/11/23 Cl 109, Cr 0.95, BUN 32, , alb 3.2, ACE 39, Hb 12.2, TSH 0.253, CRP 3.6), imaging + procedures (6/15/23 [facility]). • RTO 5-6 weeks. • Emotional support provided. • Medication safety issues addressed - no driving under the influence of narcotics (including opioids), watch for adverse effects including AMS or respiratory depression (slowed breathing), keep medications stored in a safe/locked environment, do not use alcohol while opioids or other narcotics are in one's system.       Requested Prescriptions      No prescriptions requested or ordered in this encounter       Medications Discontinued During This Encounter   Medication Reason   • predniSONE 10 mg tablet Duplicate order       Representatives have queried the patient's controlled substance dispensing history in the Prescription Drug Monitoring Program in compliance with regulations before I have prescribed any controlled substances. The prescription history is consistent with prescribed therapy and our practice policies. 20 minutes were spent in this ambulatory visit with greater than 50% of the time spent face to face with patient in counseling or coordination of care including symptom assessment and management, medication review, medication adjustment, psychosocial support, chart review, imaging review, lab review, goals of care, supportive listening and anticipatory guidance. All of the patient's questions were answered during this discussion. SUBJECTIVE:  Chief Complaint   Patient presents with   • Follow-up   • Cancer   • Pain   • Counseling   • Sarcoidosis   • Constipation        HPI    Karan Joiner is a 56 y.o. female w/ rectosigmoid cancer s/p laparoscopic diverting colostomy in 12/2022 and s/p neoadjuvant chemoXRT w/ plan for surgery 8/17/23; chemo-induced diarrhea, biopsy-proven pulmonary sarcoidosis on steroids, hepatic sarcoidosis, nephrolithiasis w/ urinary obstruction s/p bilateral ureteral stenting. She follows w/ Dr Gabriele Schilling (Radiation Oncology), Dr Mariana Tyler (Gastroenterology), Dr Ludy Vila (Medical Oncology). Patient is known to [facility] clinic; seen 6/21/23 for symptom assessment and management, medication review, psychosocial support, chart review, imaging review, lab review, advanced directives, goals of care, supportive listening and anticipatory guidance. Patient reports abdominal pain (s/t cancer, procedures, sarcoidosis) is well-managed w/ cyclobenzaprine for muscle spasm component, and oxyIR when needed. She finds she can manage w/ 1 to 2 tablets of oxyIR per day some days.  She took a tablet at midnight last night and again at 11am, and reports her pain is controlled at time of visit (she had taken a tab of cyclobenzaprine this afternoon as well). She states that when her pain is better controlled, sleep is restorative. When pain spikes she notes some dyspnea; she reports this improves w/ rest, applying abdominal pressure w/ a pillow. She endorses some mild constipation which she associates w/ certain foods ("rice") more than opioid use; she states Miralax has been sufficient. Patient is frustrated that she is still experiencing intermittent hematuria and dysuria. She is hopeful that her ureteral stents might be removed in the near future. She is looking forward to her upcoming surgery w/ CRS in August. She endorses some mild episodic dysphoric and anxiousness but is able to redirect (by occupying herself w/ household tasks, or watching television). PDMP shows no concerns. The following portions of the medical history were reviewed: past medical history, surgical history, problem list, medication list, family history, and social history. Current Outpatient Medications:   •  acetaminophen (TYLENOL) 500 mg tablet, Take 2 tablets (1,000 mg total) by mouth 3 (three) times a day, Disp: 180 tablet, Rfl: 2  •  amLODIPine (NORVASC) 10 mg tablet, Take 1 tablet (10 mg total) by mouth daily, Disp: 90 tablet, Rfl: 3  •  cyanocobalamin (VITAMIN B-12) 1000 MCG tablet, Take 1,000 mcg by mouth daily, Disp: , Rfl:   •  cyclobenzaprine (FLEXERIL) 10 mg tablet, Take 10 mg by mouth 3 (three) times a day as needed for muscle spasms Unsure of dosage patient states. , Disp: , Rfl:   •  oxyCODONE (Roxicodone) 5 immediate release tablet, Take 1-2 tablets (5-10 mg total) by mouth every 4 (four) hours as needed for moderate pain or severe pain Max Daily Amount: 60 mg, Disp: 180 tablet, Rfl: 0  •  predniSONE 10 mg tablet, Take 4 tablets (40 mg total) by mouth daily Please take for 4 weeks and then decrease by 5 mg increments every 2 weeks. (Patient taking differently: Take 30 mg by mouth daily Please take for 4 weeks and then decrease by 5 mg increments every 2 weeks. -- Taking 20mg and then 10mg - titrating), Disp: 500 tablet, Rfl: 0  •  ursodiol (ACTIGALL) 300 mg capsule, Take 3 capsules (900 mg total) by mouth in the morning, Disp: 90 capsule, Rfl: 11  •  CAPECITABINE PO, Take 1,650 mg by mouth 2 (two) times a day (Patient not taking: Reported on 7/10/2023), Disp: , Rfl:   •  docusate sodium (COLACE) 100 mg capsule, Take 1 capsule (100 mg total) by mouth 2 (two) times a day for 15 days, Disp: 30 capsule, Rfl: 0  •  fluorouracil (ADRUCIL) 5 GM/100ML SOLN, , Disp: , Rfl:   •  potassium chloride (MICRO-K) 10 MEQ CR capsule, TAKE 4 CAPSULES (40 MEQ TOTAL) BY MOUTH 2 (TWO) TIMES A DAY, Disp: 240 capsule, Rfl: 1  •  Potassium Gluconate 550 MG TABS, Take 1 tablet by mouth in the morning (Patient not taking: Reported on 7/14/2023), Disp: , Rfl:     Review of Systems   Constitutional: Positive for activity change and fatigue. Respiratory: Positive for shortness of breath (on exertion). Gastrointestinal: Positive for abdominal pain (responds well to current analgesic regimen) and constipation (mild, occasional; improves w/ Miralax). Negative for nausea and vomiting. Genitourinary: Positive for dysuria ("comes and goes") and hematuria (intermittent). Psychiatric/Behavioral: Positive for dysphoric mood (mild, intermittent; able to redirect) and sleep disturbance (improves w/ better pain control). The patient is nervous/anxious (able to redirect). All other systems reviewed and are negative. OBJECTIVE:  /80 (BP Location: Left arm, Patient Position: Sitting, Cuff Size: Standard)   Pulse 80   Temp (!) 97 °F (36.1 °C) (Temporal)   Resp 15   Ht 5' 2" (1.575 m)   Wt 70.3 kg (155 lb)   SpO2 98%   BMI 28.35 kg/m²   Physical Exam  Vitals reviewed. Constitutional:       General: She is not in acute distress.      Appearance: She is well-groomed and overweight. She is not toxic-appearing. HENT:      Head: Normocephalic and atraumatic. Right Ear: External ear normal.      Left Ear: External ear normal.   Eyes:      General: No scleral icterus. Right eye: No discharge. Left eye: No discharge. Extraocular Movements: Extraocular movements intact. Conjunctiva/sclera: Conjunctivae normal.      Pupils: Pupils are equal, round, and reactive to light. Cardiovascular:      Rate and Rhythm: Normal rate. Pulmonary:      Effort: Pulmonary effort is normal. No tachypnea, bradypnea, accessory muscle usage or respiratory distress. Comments: Able to speak comfortably in complete sentences on room air at rest.  Abdominal:      General: Abdomen is protuberant. There is no distension. Tenderness: There is no guarding. Musculoskeletal:      Cervical back: Normal range of motion. Right lower leg: No edema. Left lower leg: No edema. Skin:     General: Skin is dry. Coloration: Skin is not pale. Neurological:      Mental Status: She is alert and oriented to person, place, and time. Cranial Nerves: No dysarthria or facial asymmetry. Comments: Using no assistive devices for ambulation. Psychiatric:         Attention and Perception: Attention normal.         Mood and Affect: Mood and affect normal.         Speech: Speech normal.         Behavior: Behavior normal. Behavior is cooperative. Thought Content: Thought content normal.         Cognition and Memory: Cognition and memory normal.         Judgment: Judgment normal.          Quintin Ireland MD  [facility]  [phone]    Portions of this document may have been created using dictation software and as such some "sound alike" terms may have been generated by the system. Do not hesitate to contact me with any questions or clarifications.

## 2023-07-19 NOTE — ASSESSMENT & PLAN NOTE
Pt is able to complete 4 METS without trouble  Based on NSQIP, perioperative cardiac complication 1% which is standard. EKG done 2 days ago unremarkable  No further workups needed prior to surgery. Pt is on prednisone for sarcoidosis. This will increase perioperative infection risk. Need close monitor. BP mildly elevated today. Will icnrease amlodipine to 10mg. This does not affect the decision of surgery. Noticed abnormal thyroid lab. This is likely 2/2 prednisone use.

## 2023-07-20 ENCOUNTER — OFFICE VISIT (OUTPATIENT)
Dept: RHEUMATOLOGY | Facility: CLINIC | Age: 58
End: 2023-07-20
Payer: COMMERCIAL

## 2023-07-20 ENCOUNTER — TELEPHONE (OUTPATIENT)
Dept: DERMATOLOGY | Facility: CLINIC | Age: 58
End: 2023-07-20

## 2023-07-20 VITALS
HEIGHT: 62 IN | WEIGHT: 156.2 LBS | SYSTOLIC BLOOD PRESSURE: 124 MMHG | BODY MASS INDEX: 28.74 KG/M2 | DIASTOLIC BLOOD PRESSURE: 80 MMHG

## 2023-07-20 DIAGNOSIS — D86.89 SARCOIDOSIS OF OTHER SITES: ICD-10-CM

## 2023-07-20 DIAGNOSIS — D86.0 PULMONARY SARCOIDOSIS (HCC): Primary | ICD-10-CM

## 2023-07-20 DIAGNOSIS — R70.0 ELEVATED SED RATE: ICD-10-CM

## 2023-07-20 DIAGNOSIS — R74.8 ELEVATED LIVER ENZYMES: ICD-10-CM

## 2023-07-20 DIAGNOSIS — L60.8: ICD-10-CM

## 2023-07-20 PROCEDURE — 99204 OFFICE O/P NEW MOD 45 MIN: CPT | Performed by: PHYSICIAN ASSISTANT

## 2023-07-20 NOTE — PROGRESS NOTES
Assessment and Plan:  Ms. 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 She is on prednisone taper, presently at a dose of 30 mg daily, weaning by 5 mg every 2 weeks. She is being monitored closely with CBC, CMP, INR, sed rate, CRP, and ACE level every 2 weeks. She was referred to rheumatology to evaluate for any other organ involvement. At this time, the patient does not have any signs or symptoms concerning for sarcoid arthropathy, nor presentation consistent with inflammatory arthritis, or other. The majority of serologic work-up has been ordered so far by other specialists, therefore I will order the remainder of labs to be done at the time of her next lab draw. We discussed that rheumatologic follow-up will be determined based on the remainder of work-up. Otherwise, the patient is planned for upcoming colon resection on 8/17/2023. Continue follow-up with heme/onc, rad/onc, and GI. Follow-up TBD. Plan:  Diagnoses and all orders for this visit:    Pulmonary sarcoidosis ([address])    Sarcoidosis of other sites    Elevated liver enzymes  -     Ambulatory Referral to Rheumatology  -     Cyclic citrul peptide antibody, IgG  -     RF Screen w/ Reflex to Titer  -     Sjogren's Antibodies    Elevated sed rate  -     Cyclic citrul peptide antibody, IgG  -     RF Screen w/ Reflex to Titer  -     Sjogren's Antibodies  -     ANCA Screen With MPO and PR3 With Reflex To ANCA Titer    Transverse melanonychia        I have personally reviewed prior notes, recent laboratory results, and pertinent films in PACS. Activities as tolerated. Exercise: try to maintain a low impact exercise regimen as much as possible. Walk for 30 minutes a day for at least 3 days a week. Continue other medications as prescribed by PCP and other specialists. RTC TBD      Follow-up plan: TBD        Chief Complaint  No chief complaint on file.  " I am not sure why I am here"      HAZEL Choi is a 56 y.o.  female who presents as a Rheumatology consult referred by Nathaly Robbins MD for evaluation of " elevated liver enzymes."    The patient is being managed for biopsy-proven pulmonary and hepatic sarcoidosis as well as rectosigmoid cancer by GI, heme-onc, and rad onc. She is on prednisone taper, presently at a dose of 30 mg daily, weaning by 5 mg every 2 weeks. She is being monitored closely with CBC, CMP, INR, sed rate, CRP, and ACE level every 2 weeks. She was referred to rheumatology to evaluate for any other organ involvement. Upon discussion with the patient, her biggest complaint today would be the shortness of breath. She does note an increased appetite and some moon facies while on the prednisone. She does report some hair loss, but this was during the time of chemotherapy. She also noticed a darkening of her fingernails following chemotherapy, but notes that it is growing out and now the nails are starting to become normal in color. She denies other complaints including: Joint pain/swelling/heat/redness/stiffness, myalgias, dry eyes, dry mouth, inflammatory eye disease, persistent/recurrent skin rash, psoriasis, photosensitivity, mouth/nose ulcers, swollen glands, vomiting, inflammatory bowel disease, blood clots, miscarriages, or Raynaud's symptoms. Review of Systems  Review of Systems  Constitutional: Negative for weight change, chills, night sweats, fatigue. ENT/Mouth: Negative for hearing changes, ear pain, nasal congestion, sinus pain, hoarseness, sore throat, rhinorrhea, swallowing difficulty. Eyes: Negative for pain, redness, discharge, vision changes. Cardiovascular: +SOB, palpitations. Negative for chest pain. Respiratory: +DAMICO. Negative for cough, sputum, wheezing. Gastrointestinal: Negative for nausea, vomiting, diarrhea, constipation, pain, heartburn. Genitourinary: Negative for dysuria, urinary frequency, hematuria. Musculoskeletal: As per HPI. Skin: +color changes (nail beds). Negative for skin rash. Neuro: Negative for weakness, numbness, tingling, loss of consciousness. Psych: Negative for anxiety, depression. Heme/Lymph: Negative for easy bruising, bleeding. Allergies  Allergies   Allergen Reactions   • Oxaliplatin Shortness Of Breath     Reactions occurred with 2nd and 3rd infusions (about 1-3 hours from initiation of infusion) and required treatment with steroids and antihistamines. Please refer to allergy note on 2/7/2023 for detailed description of her reactions. Home Medications    Current Outpatient Medications:   •  acetaminophen (TYLENOL) 500 mg tablet, Take 2 tablets (1,000 mg total) by mouth 3 (three) times a day, Disp: 180 tablet, Rfl: 2  •  amLODIPine (NORVASC) 10 mg tablet, Take 1 tablet (10 mg total) by mouth daily, Disp: 90 tablet, Rfl: 3  •  CAPECITABINE PO, Take 1,650 mg by mouth 2 (two) times a day (Patient not taking: Reported on 7/10/2023), Disp: , Rfl:   •  cyanocobalamin (VITAMIN B-12) 1000 MCG tablet, Take 1,000 mcg by mouth daily, Disp: , Rfl:   •  cyclobenzaprine (FLEXERIL) 10 mg tablet, Take 10 mg by mouth 3 (three) times a day as needed for muscle spasms Unsure of dosage patient states. , Disp: , Rfl:   •  docusate sodium (COLACE) 100 mg capsule, Take 1 capsule (100 mg total) by mouth 2 (two) times a day for 15 days, Disp: 30 capsule, Rfl: 0  •  fluorouracil (ADRUCIL) 5 GM/100ML SOLN, , Disp: , Rfl:   •  oxyCODONE (Roxicodone) 5 immediate release tablet, Take 1-2 tablets (5-10 mg total) by mouth every 4 (four) hours as needed for moderate pain or severe pain Max Daily Amount: 60 mg, Disp: 180 tablet, Rfl: 0  •  potassium chloride (MICRO-K) 10 MEQ CR capsule, TAKE 4 CAPSULES (40 MEQ TOTAL) BY MOUTH 2 (TWO) TIMES A DAY, Disp: 240 capsule, Rfl: 1  •  Potassium Gluconate 550 MG TABS, Take 1 tablet by mouth in the morning (Patient not taking: Reported on 7/14/2023), Disp: , Rfl:   •  predniSONE 10 mg tablet, Take 4 tablets (40 mg total) by mouth daily Please take for 4 weeks and then decrease by 5 mg increments every 2 weeks. (Patient taking differently: Take 30 mg by mouth daily Please take for 4 weeks and then decrease by 5 mg increments every 2 weeks.  -- Taking 20mg and then 10mg - titrating), Disp: 500 tablet, Rfl: 0  •  ursodiol (ACTIGALL) 300 mg capsule, Take 3 capsules (900 mg total) by mouth in the morning, Disp: 90 capsule, Rfl: 11    Past Medical History  Past Medical History:   Diagnosis Date   • Cancer ([address])    • Colon cancer ([address])    • Fall    • Headache    • Hemochromatosis, unspecified    • History of transfusion     2022 - no adverse reaction   • Hypertension    • Kidney calculi    • Kidney stone    • Liver disease     hemangioma   • Muscle weakness    • Personal history of COVID-19 12/2022   • Sarcoidosis    • Seizures ([address])     2022       Past Surgical History   Past Surgical History:   Procedure Laterality Date   • ABSCESS DRAINAGE      left breast   • CHEST TUBE INSERTION     • COLON SURGERY      colostomy   • COLONOSCOPY     • FL GUIDED CENTRAL VENOUS ACCESS DEVICE INSERTION  3/21/2023   • FL RETROGRADE PYELOGRAM  1/23/2023   • FL RETROGRADE PYELOGRAM  4/3/2023   • IR BIOPSY LIVER MASS  5/9/2023   • LUNG BIOPSY     • HI CYSTO/URETERO W/LITHOTRIPSY &INDWELL STENT INSRT Bilateral 1/23/2023    Procedure: CYSTOSCOPY  RIGHT URETEROSCOPY WITH LITHOTRIPSY HOLMIUM LASER, BILATERAL RETROGRADE PYELOGRAM AND BILATERAL  URETERAL STENT INSERTION;  Surgeon: Candice Pearce MD;  Location[facility];  Service: Urology   • HI CYSTO/URETERO W/LITHOTRIPSY &INDWELL STENT INSRT Right 4/3/2023    Procedure: RIGHT URETEROSCOPY WITH LITHOTRIPSY HOLMIUM LASER, RETROGRADE PYELOGRAM; BILATERAL EXCHANGE STENT URETERAL;  Surgeon: Colby Kaplan MD;  Location: [facility];  Service: Urology   • NM INSJ TUNNELED CTR VAD W/SUBQ PORT AGE 5 YR/> N/A 3/21/2023    Procedure: INSERTION VENOUS PORT ( PORT-A-CATH) IR;  Surgeon: Andre Heath DO;  Location: [facility];  Service: Interventional Radiology   • TONSILLECTOMY     • URINARY SURGERY Bilateral     bilateral stents       Family History    Family History   Problem Relation Age of Onset   • Cancer Mother    • Cirrhosis Father      No known family history of autoimmune or inflammatory diseases. Social History  Social History     Substance and Sexual Activity   Alcohol Use Never     Social History     Substance and Sexual Activity   Drug Use Never     Social History     Tobacco Use   Smoking Status Never   • Passive exposure: Past   Smokeless Tobacco Never       Objective:  Vitals:    07/20/23 1357   BP: 124/80   Weight: 70.9 kg (156 lb 3.2 oz)   Height: 5' 2" (1.575 m)       Physical Exam  General: Well appearing, well nourished, in no acute distress. Oriented x 3, normal mood and affect. Ambulating without difficulty. Skin: Good turgor, no rash, unusual bruising or prominent lesions. Hair: Normal texture and distribution. Nails: +melonychia (growing out - now on 1/2 of each nail)  HEENT:  Head: Normocephalic, atraumatic. Eyes: Conjunctiva clear, sclera non-icteric, EOM intact. Nose: No external lesions, mucosa non-inflamed. Mouth: Mucous membranes moist, no mucosal lesions. Neck: Supple  Extremities: No amputations or deformities, cyanosis, edema. Musculoskeletal:   No asymmetry or deformities noted of bilateral upper and lower extremities. No pain or swelling of joints bilaterally to include: shoulder, elbow, wrist, MCP I-V, PIP I-V, knee, ankle, MTP I-V.   Normal active and passive ROM of neck and bilateral upper and lower extremities. Normal  strength bilaterally. Neurologic: Alert and oriented. No focal neurological deficits appreciated. Psychiatric: Normal mood and affect. Reviewed labs. Labs:   Appointment on 07/11/2023   Component Date Value Ref Range Status   • TSH 3RD GENERATON 07/11/2023 0.253 (L)  0.450 - 4.500 uIU/mL Final    The recommended reference ranges for TSH during pregnancy are as follows:   First trimester 0.1 to 2.5 uIU/mL   Second trimester  0.2 to 3.0 uIU/mL   Third trimester 0.3 to 3.0 uIU/m    Note: Normal ranges may not apply to patients who are transgender, non-binary, or whose legal sex, sex at birth, and gender identity differ. Adult TSH (3rd generation) reference range follows the recommended guidelines of the [employer], January, 2020. • T3, Total 07/11/2023 0.8 (L)  0.9-1.8 ng/mL ng/mL Final   • Protime 07/11/2023 12.2  11.6 - 14.5 seconds Final   • INR 07/11/2023 0.89  0.84 - 1.19 Final   • Sed Rate 07/11/2023 23  0 - 29 mm/hour Final   • CRP 07/11/2023 3.6 (H)  <3.0 mg/L Final   • Angio Convert Enzyme 07/11/2023 39  14 - 82 U/L Final   • Free T4 07/11/2023 0.75  0.61 - 1.12 ng/dL Final    Specimens with biotin concentrations > 10 ng/mL can lead to significant (> 10%) positive bias in result.    Ancillary Orders on 07/07/2023   Component Date Value Ref Range Status   • WBC 07/11/2023 8.07  4.31 - 10.16 Thousand/uL Final   • RBC 07/11/2023 4.06  3.81 - 5.12 Million/uL Final   • Hemoglobin 07/11/2023 12.2  11.5 - 15.4 g/dL Final   • Hematocrit 07/11/2023 39.2  34.8 - 46.1 % Final   • MCV 07/11/2023 97  82 - 98 fL Final   • MCH 07/11/2023 30.0  26.8 - 34.3 pg Final   • MCHC 07/11/2023 31.1 (L)  31.4 - 37.4 g/dL Final   • RDW 07/11/2023 17.7 (H)  11.6 - 15.1 % Final   • MPV 07/11/2023 9.7  8.9 - 12.7 fL Final   • Platelets 59/47/0358 189  149 - 390 Thousands/uL Final   • nRBC 07/11/2023 0  /100 WBCs Final   • Neutrophils Relative 07/11/2023 88 (H)  43 - 75 % Final   • Immat GRANS % 07/11/2023 1  0 - 2 % Final   • Lymphocytes Relative 07/11/2023 5 (L)  14 - 44 % Final   • Monocytes Relative 07/11/2023 5  4 - 12 % Final   • Eosinophils Relative 07/11/2023 1  0 - 6 % Final   • Basophils Relative 07/11/2023 0  0 - 1 % Final   • Neutrophils Absolute 07/11/2023 7.17  1.85 - 7.62 Thousands/µL Final   • Immature Grans Absolute 07/11/2023 0.07  0.00 - 0.20 Thousand/uL Final   • Lymphocytes Absolute 07/11/2023 0.40 (L)  0.60 - 4.47 Thousands/µL Final   • Monocytes Absolute 07/11/2023 0.36  0.17 - 1.22 Thousand/µL Final   • Eosinophils Absolute 07/11/2023 0.06  0.00 - 0.61 Thousand/µL Final   • Basophils Absolute 07/11/2023 0.01  0.00 - 0.10 Thousands/µL Final   • Sodium 07/11/2023 143  135 - 147 mmol/L Final   • Potassium 07/11/2023 3.9  3.5 - 5.3 mmol/L Final   • Chloride 07/11/2023 109 (H)  96 - 108 mmol/L Final   • CO2 07/11/2023 27  21 - 32 mmol/L Final   • ANION GAP 07/11/2023 7  mmol/L Final   • BUN 07/11/2023 32 (H)  5 - 25 mg/dL Final   • Creatinine 07/11/2023 0.95  0.60 - 1.30 mg/dL Final    Standardized to IDMS reference method   • Glucose, Fasting 07/11/2023 93  65 - 99 mg/dL Final    Specimen collection should occur prior to Sulfasalazine administration due to the potential for falsely depressed results. Specimen collection should occur prior to Sulfapyridine administration due to the potential for falsely elevated results. • Calcium 07/11/2023 9.5  8.3 - 10.1 mg/dL Final   • Corrected Calcium 07/11/2023 10.1  8.3 - 10.1 mg/dL Final   • AST 07/11/2023 28  5 - 45 U/L Final    Specimen collection should occur prior to Sulfasalazine administration due to the potential for falsely depressed results. • ALT 07/11/2023 62  12 - 78 U/L Final    Specimen collection should occur prior to Sulfasalazine and/or Sulfapyridine administration due to the potential for falsely depressed results.     • Alkaline Phosphatase 07/11/2023 199 (H)  46 - 116 U/L Final   • Total Protein 07/11/2023 6.7  6.4 - 8.4 g/dL Final   • Albumin 07/11/2023 3.2 (L)  3.5 - 5.0 g/dL Final   • Total Bilirubin 07/11/2023 0.74  0.20 - 1.00 mg/dL Final    Use of this assay is not recommended for patients undergoing treatment with eltrombopag due to the potential for falsely elevated results. • eGFR 07/11/2023 67  ml/min/1.73sq m Final   Hospital Outpatient Visit on 07/05/2023   Component Date Value Ref Range Status   • Urine Culture 07/05/2023 <10,000 cfu/ml   Final    Mixed Contaminants X2   Ancillary Orders on 06/30/2023   Component Date Value Ref Range Status   • Protime 06/30/2023 12.6  11.6 - 14.5 seconds Final   • INR 06/30/2023 0.92  0.84 - 1.19 Final   • Sed Rate 06/30/2023 27  0 - 29 mm/hour Final   • CRP 06/30/2023 <3.0  <3.0 mg/L Final   • Angio Convert Enzyme 06/30/2023 56  14 - 82 U/L Final   Ancillary Orders on 06/23/2023   Component Date Value Ref Range Status   • WBC 06/30/2023 8.31  4.31 - 10.16 Thousand/uL Final   • RBC 06/30/2023 3.91  3.81 - 5.12 Million/uL Final   • Hemoglobin 06/30/2023 11.7  11.5 - 15.4 g/dL Final   • Hematocrit 06/30/2023 36.9  34.8 - 46.1 % Final   • MCV 06/30/2023 94  82 - 98 fL Final   • MCH 06/30/2023 29.9  26.8 - 34.3 pg Final   • MCHC 06/30/2023 31.7  31.4 - 37.4 g/dL Final   • RDW 06/30/2023 19.0 (H)  11.6 - 15.1 % Final   • MPV 06/30/2023 9.4  8.9 - 12.7 fL Final   • Platelets 71/97/9582 179  149 - 390 Thousands/uL Final   • nRBC 06/30/2023 0  /100 WBCs Final    This is an appended report. These results have been appended to a previously preliminary verified report.    • Neutrophils Relative 06/30/2023 90 (H)  43 - 75 % Final   • Immat GRANS % 06/30/2023 1  0 - 2 % Final   • Lymphocytes Relative 06/30/2023 4 (L)  14 - 44 % Final   • Monocytes Relative 06/30/2023 5  4 - 12 % Final   • Eosinophils Relative 06/30/2023 0  0 - 6 % Final   • Basophils Relative 06/30/2023 0  0 - 1 % Final   • Neutrophils Absolute 06/30/2023 7.48  1.85 - 7.62 Thousands/µL Final   • Immature Grans Absolute 06/30/2023 0.09  0.00 - 0.20 Thousand/uL Final   • Lymphocytes Absolute 06/30/2023 0.29 (L)  0.60 - 4.47 Thousands/µL Final   • Monocytes Absolute 06/30/2023 0.43  0.17 - 1.22 Thousand/µL Final   • Eosinophils Absolute 06/30/2023 0.01  0.00 - 0.61 Thousand/µL Final   • Basophils Absolute 06/30/2023 0.01  0.00 - 0.10 Thousands/µL Final   • Sodium 06/30/2023 139  135 - 147 mmol/L Final   • Potassium 06/30/2023 3.6  3.5 - 5.3 mmol/L Final   • Chloride 06/30/2023 111 (H)  96 - 108 mmol/L Final   • CO2 06/30/2023 25  21 - 32 mmol/L Final   • ANION GAP 06/30/2023 3  mmol/L Final   • BUN 06/30/2023 37 (H)  5 - 25 mg/dL Final   • Creatinine 06/30/2023 0.86  0.60 - 1.30 mg/dL Final    Standardized to IDMS reference method   • Glucose, Fasting 06/30/2023 84  65 - 99 mg/dL Final    Specimen collection should occur prior to Sulfasalazine administration due to the potential for falsely depressed results. Specimen collection should occur prior to Sulfapyridine administration due to the potential for falsely elevated results. • Calcium 06/30/2023 9.2  8.3 - 10.1 mg/dL Final   • Corrected Calcium 06/30/2023 9.7  8.3 - 10.1 mg/dL Final   • AST 06/30/2023 20  5 - 45 U/L Final    Specimen collection should occur prior to Sulfasalazine administration due to the potential for falsely depressed results. • ALT 06/30/2023 84 (H)  12 - 78 U/L Final    Specimen collection should occur prior to Sulfasalazine and/or Sulfapyridine administration due to the potential for falsely depressed results. • Alkaline Phosphatase 06/30/2023 243 (H)  46 - 116 U/L Final   • Total Protein 06/30/2023 6.6  6.4 - 8.4 g/dL Final   • Albumin 06/30/2023 3.4 (L)  3.5 - 5.0 g/dL Final   • Total Bilirubin 06/30/2023 0.66  0.20 - 1.00 mg/dL Final    Use of this assay is not recommended for patients undergoing treatment with eltrombopag due to the potential for falsely elevated results.    • eGFR 06/30/2023 76 ml/min/1.73sq m Final   Appointment on 06/20/2023   Component Date Value Ref Range Status   • Hemoglobin A1C 06/30/2023 6.3 (H)  Normal 3.8-5.6%; PreDiabetic 5.7-6.4%; Diabetic >=6.5%; Glycemic control for adults with diabetes <7.0% % Final   • EAG 06/30/2023 134  mg/dl Final   • ABO Grouping 06/30/2023 A   Final   • Rh Factor 06/30/2023 Positive   Final   • Antibody Screen 06/30/2023 Negative   Final   • Specimen Expiration Date 06/30/2023 [id number]   Final   • BLANCA 06/30/2023 Negative  Negative Final   • Mitochondrial Ab 06/30/2023 <20.0  0.0 - 20.0 Units Final                                    Negative    0.0 - 20.0                                  Equivocal  20.1 - 24.9                                  Positive         >24.9  Mitochondrial (M2) Antibodies are found in 90-96% of  patients with primary biliary cirrhosis. • IGA 06/20/2023 193.0  70.0 - 400.0 mg/dL Final   • IGG 06/20/2023 862.0  700.0 - 1,600.0 mg/dL Final   • IGM 06/20/2023 39.0 (L)  40.0 - 230.0 mg/dL Final   • Smooth Muscle Ab 06/30/2023 3  0 - 19 Units Final                     Negative                     0 - 19                   Weak positive               20 - 30                   Moderate to strong positive     >30   Actin Antibodies are found in 52-85% of patients with   autoimmune hepatitis or chronic active hepatitis and   in 22% of patients with primary biliary cirrhosis.    • A-1 Antitrypsin 06/30/2023 135  101 - 187 mg/dL Final   • A-1 Antitrypsin Pheno 06/30/2023 MM   Final           Phenotype  Population     A-1-AT Concentration*                    Incidence %   % of MM (Typical Range)         MM            86.5%       100%     (96 - 189)         MS             8.0%        86%     (83 - 161)         MZ             3.9%        61%     (60 - 111)         FM             0.4%       100%     (93 - 191)         SZ             0.3%        41%     (42 -  75)         SS             0.1%        64%     (62 - 119)         ZZ             0.05% 19%     (16 -  38)         FS             0.05%       70%     (70 - 128)         FZ            Unknown      46%     (44 -  88)         FF            Unknown                Unknown  *A-1-AT concentration in the homozygous MM phenotype   is taken as the reference normal. Percent deficiency   in each phenotype is reported relative to this   reference. Ranges used to confirm phenotype. • A-1 Antitrypsin 06/30/2023 133  101 - 187 mg/dL Final   • IgG 1 06/30/2023 409  248 - 810 mg/dL Final   • IgG 2 06/30/2023 241  130 - 555 mg/dL Final   • IgG 3 06/30/2023 25  15 - 102 mg/dL Final   • IgG 4 06/30/2023 17  2 - 96 mg/dL Final   • IgG 06/30/2023 821  586 - 1602 mg/dL Final   • Hep A Total Ab 06/30/2023 Reactive (A)  Non-Reactive Final   • Hep B S Ab 06/30/2023 280.00  3-500 mIU/mL mIU/mL Final    Protective Immunity: Hep B Surface Antibody >= 10 mIu/ml (Traceable to [facility] International Reference Preparation)   • Hep B Core Total Ab 06/30/2023 Non-reactive  Non-Reactive Final   • Hepatitis C Ab 06/30/2023 Non-reactive  Non-Reactive Final   • AFP TUMOR MARKER 06/30/2023 1.73  0.00 - 9.00 ng/mL Final   • CA 19-9 06/30/2023 20  0 - 35 U/mL Final    [employer] Electrochemiluminescence Immunoassay (ECLIA)  Values obtained with different assay methods or kits cannot be  used interchangeably. Results cannot be interpreted as absolute  evidence of the presence or absence of malignant disease. • QFT Nil 06/30/2023 0.04  0 - 8.0 IU/ml Final   • QFT TB1-NIL 06/30/2023 0.02  IU/ml Final   • QFT TB2-NIL 06/30/2023 0.03  IU/ml Final   • QFT Mitogen-NIL 06/30/2023 2.87  IU/ml Final   • QFT Final Interpretation 06/30/2023 Negative  Negative Final    No Interferon-gamma response to M. tuberculosis antigens detected. Infection with M. tuberculosis is unlikely. A single negative result does not exclude infection with M. tuberculosis.  In patients at high risk for M. tuberculosis infection, a second test should be considered in accordance with the 2017 ATS/IDSA/CDC Clinical Practice Guidelines for Diagnosis of Tuberculosis in Adults and Children. False negative results can be a result of incorrect blood sample collection or handling of the specimen affecting lymphocyte function. • T pallidum Antibodies (FTA-ABS) 06/30/2023 Non Reactive  Non Reactive Final   • Syphilis Total Antibody 06/30/2023 Non-reactive  Non-Reactive Final    No serological evidence of infection with T. pallidum. Early or incubating syphilis infection cannot be excluded. Consider repeat testing based on clinical suspicion. • Iron Saturation 06/20/2023 11 (L)  15 - 50 % Final   • TIBC 06/20/2023 453 (H)  250 - 450 ug/dL Final   • Iron 06/20/2023 50  50 - 170 ug/dL Final    Patients treated with metal-binding drugs (ie. Deferoxamine) may have depressed iron values. • Ferritin 06/30/2023 27  11 - 307 ng/mL Final   Patient Message on 06/14/2023   Component Date Value Ref Range Status   • WBC 06/20/2023 7.94  4.31 - 10.16 Thousand/uL Final   • RBC 06/20/2023 4.15  3.81 - 5.12 Million/uL Final   • Hemoglobin 06/20/2023 12.3  11.5 - 15.4 g/dL Final   • Hematocrit 06/20/2023 38.3  34.8 - 46.1 % Final   • MCV 06/20/2023 92  82 - 98 fL Final   • MCH 06/20/2023 29.6  26.8 - 34.3 pg Final   • MCHC 06/20/2023 32.1  31.4 - 37.4 g/dL Final   • RDW 06/20/2023 19.7 (H)  11.6 - 15.1 % Final   • MPV 06/20/2023 9.5  8.9 - 12.7 fL Final   • Platelets 98/02/1889 219  149 - 390 Thousands/uL Final   • nRBC 06/20/2023 0  /100 WBCs Final    This is an appended report. These results have been appended to a previously preliminary verified report.    • Neutrophils Relative 06/20/2023 93 (H)  43 - 75 % Final   • Immat GRANS % 06/20/2023 1  0 - 2 % Final   • Lymphocytes Relative 06/20/2023 3 (L)  14 - 44 % Final   • Monocytes Relative 06/20/2023 3 (L)  4 - 12 % Final   • Eosinophils Relative 06/20/2023 0  0 - 6 % Final   • Basophils Relative 06/20/2023 0  0 - 1 % Final   • Neutrophils Absolute 06/20/2023 7.40  1.85 - 7.62 Thousands/µL Final   • Immature Grans Absolute 06/20/2023 0.05  0.00 - 0.20 Thousand/uL Final   • Lymphocytes Absolute 06/20/2023 0.22 (L)  0.60 - 4.47 Thousands/µL Final   • Monocytes Absolute 06/20/2023 0.26  0.17 - 1.22 Thousand/µL Final   • Eosinophils Absolute 06/20/2023 0.00  0.00 - 0.61 Thousand/µL Final   • Basophils Absolute 06/20/2023 0.01  0.00 - 0.10 Thousands/µL Final   • Sodium 06/20/2023 140  135 - 147 mmol/L Final   • Potassium 06/20/2023 3.9  3.5 - 5.3 mmol/L Final   • Chloride 06/20/2023 110 (H)  96 - 108 mmol/L Final   • CO2 06/20/2023 26  21 - 32 mmol/L Final   • ANION GAP 06/20/2023 4  mmol/L Final   • BUN 06/20/2023 32 (H)  5 - 25 mg/dL Final   • Creatinine 06/20/2023 0.90  0.60 - 1.30 mg/dL Final    Standardized to IDMS reference method   • Glucose 06/20/2023 138  65 - 140 mg/dL Final    If the patient is fasting, the ADA then defines impaired fasting glucose as > 100 mg/dL and diabetes as > or equal to 123 mg/dL. Specimen collection should occur prior to Sulfasalazine administration due to the potential for falsely depressed results. Specimen collection should occur prior to Sulfapyridine administration due to the potential for falsely elevated results. • Calcium 06/20/2023 9.9  8.3 - 10.1 mg/dL Final   • Corrected Calcium 06/20/2023 10.4 (H)  8.3 - 10.1 mg/dL Final   • AST 06/20/2023 53 (H)  5 - 45 U/L Final    Specimen collection should occur prior to Sulfasalazine administration due to the potential for falsely depressed results. • ALT 06/20/2023 119 (H)  12 - 78 U/L Final    Specimen collection should occur prior to Sulfasalazine and/or Sulfapyridine administration due to the potential for falsely depressed results.     • Alkaline Phosphatase 06/20/2023 395 (H)  46 - 116 U/L Final   • Total Protein 06/20/2023 7.4  6.4 - 8.4 g/dL Final   • Albumin 06/20/2023 3.4 (L)  3.5 - 5.0 g/dL Final   • Total Bilirubin 06/20/2023 0.90  0.20 - 1.00 mg/dL Final Use of this assay is not recommended for patients undergoing treatment with eltrombopag due to the potential for falsely elevated results.    • eGFR 06/20/2023 72  ml/min/1.73sq m Final   • Protime 06/20/2023 12.4  11.6 - 14.5 seconds Final   • INR 06/20/2023 0.90  0.84 - 1.19 Final   • Sed Rate 06/20/2023 48 (H)  0 - 29 mm/hour Final   • CRP 06/20/2023 5.1 (H)  <3.0 mg/L Final   • Angio Convert Enzyme 06/20/2023 75  14 - 82 U/L Final   Appointment on 06/05/2023   Component Date Value Ref Range Status   • Angio Convert Enzyme 06/05/2023 59  14 - 82 U/L Final   Hospital Outpatient Visit on 05/27/2023   Component Date Value Ref Range Status   • WBC 05/27/2023 3.87 (L)  4.31 - 10.16 Thousand/uL Final   • RBC 05/27/2023 3.33 (L)  3.81 - 5.12 Million/uL Final   • Hemoglobin 05/27/2023 9.8 (L)  11.5 - 15.4 g/dL Final   • Hematocrit 05/27/2023 31.2 (L)  34.8 - 46.1 % Final   • MCV 05/27/2023 94  82 - 98 fL Final   • MCH 05/27/2023 29.4  26.8 - 34.3 pg Final   • MCHC 05/27/2023 31.4  31.4 - 37.4 g/dL Final   • RDW 05/27/2023 21.7 (H)  11.6 - 15.1 % Final   • MPV 05/27/2023 8.7 (L)  8.9 - 12.7 fL Final   • Platelets 46/04/4020 134 (L)  149 - 390 Thousands/uL Final   • nRBC 05/27/2023 0  /100 WBCs Final   • Neutrophils Relative 05/27/2023 83 (H)  43 - 75 % Final   • Immat GRANS % 05/27/2023 0  0 - 2 % Final   • Lymphocytes Relative 05/27/2023 5 (L)  14 - 44 % Final   • Monocytes Relative 05/27/2023 9  4 - 12 % Final   • Eosinophils Relative 05/27/2023 3  0 - 6 % Final   • Basophils Relative 05/27/2023 0  0 - 1 % Final   • Neutrophils Absolute 05/27/2023 3.20  1.85 - 7.62 Thousands/µL Final   • Immature Grans Absolute 05/27/2023 0.01  0.00 - 0.20 Thousand/uL Final   • Lymphocytes Absolute 05/27/2023 0.18 (L)  0.60 - 4.47 Thousands/µL Final   • Monocytes Absolute 05/27/2023 0.34  0.17 - 1.22 Thousand/µL Final   • Eosinophils Absolute 05/27/2023 0.13  0.00 - 0.61 Thousand/µL Final   • Basophils Absolute 05/27/2023 0.01  0.00 - 0.10 Thousands/µL Final   • Sodium 05/27/2023 140  135 - 147 mmol/L Final   • Potassium 05/27/2023 3.4 (L)  3.5 - 5.3 mmol/L Final   • Chloride 05/27/2023 106  96 - 108 mmol/L Final   • CO2 05/27/2023 27  21 - 32 mmol/L Final   • ANION GAP 05/27/2023 7  4 - 13 mmol/L Final   • BUN 05/27/2023 21  5 - 25 mg/dL Final   • Creatinine 05/27/2023 0.88  0.60 - 1.30 mg/dL Final    Standardized to IDMS reference method   • Glucose 05/27/2023 118  65 - 140 mg/dL Final    If the patient is fasting, the ADA then defines impaired fasting glucose as > 100 mg/dL and diabetes as > or equal to 123 mg/dL. • Calcium 05/27/2023 9.2  8.4 - 10.2 mg/dL Final   • AST 05/27/2023 28  13 - 39 U/L Final   • ALT 05/27/2023 37  7 - 52 U/L Final    Specimen collection should occur prior to Sulfasalazine administration due to the potential for falsely depressed results. • Alkaline Phosphatase 05/27/2023 240 (H)  34 - 104 U/L Final   • Total Protein 05/27/2023 6.0 (L)  6.4 - 8.4 g/dL Final   • Albumin 05/27/2023 3.5  3.5 - 5.0 g/dL Final   • Total Bilirubin 05/27/2023 0.62  0.20 - 1.00 mg/dL Final    Use of this assay is not recommended for patients undergoing treatment with eltrombopag due to the potential for falsely elevated results. N-acetyl-p-benzoquinone imine (metabolite of Acetaminophen) will generate erroneously low results in samples for patients that have taken an overdose of Acetaminophen.    • eGFR 05/27/2023 74  ml/min/1.73sq m Final   Hospital Outpatient Visit on 05/20/2023   Component Date Value Ref Range Status   • WBC 05/20/2023 5.66  4.31 - 10.16 Thousand/uL Final   • RBC 05/20/2023 3.67 (L)  3.81 - 5.12 Million/uL Final   • Hemoglobin 05/20/2023 10.6 (L)  11.5 - 15.4 g/dL Final   • Hematocrit 05/20/2023 33.9 (L)  34.8 - 46.1 % Final   • MCV 05/20/2023 92  82 - 98 fL Final   • MCH 05/20/2023 28.9  26.8 - 34.3 pg Final   • MCHC 05/20/2023 31.3 (L)  31.4 - 37.4 g/dL Final   • RDW 05/20/2023 20.7 (H)  11.6 - 15.1 % Final   • MPV 05/20/2023 9.0  8.9 - 12.7 fL Final   • Platelets 22/14/0126 154  149 - 390 Thousands/uL Final   • nRBC 05/20/2023 0  /100 WBCs Final   • Neutrophils Relative 05/20/2023 87 (H)  43 - 75 % Final   • Immat GRANS % 05/20/2023 1  0 - 2 % Final   • Lymphocytes Relative 05/20/2023 4 (L)  14 - 44 % Final   • Monocytes Relative 05/20/2023 6  4 - 12 % Final   • Eosinophils Relative 05/20/2023 2  0 - 6 % Final   • Basophils Relative 05/20/2023 0  0 - 1 % Final   • Neutrophils Absolute 05/20/2023 4.94  1.85 - 7.62 Thousands/µL Final   • Immature Grans Absolute 05/20/2023 0.03  0.00 - 0.20 Thousand/uL Final   • Lymphocytes Absolute 05/20/2023 0.20 (L)  0.60 - 4.47 Thousands/µL Final   • Monocytes Absolute 05/20/2023 0.34  0.17 - 1.22 Thousand/µL Final   • Eosinophils Absolute 05/20/2023 0.13  0.00 - 0.61 Thousand/µL Final   • Basophils Absolute 05/20/2023 0.02  0.00 - 0.10 Thousands/µL Final   • Sodium 05/20/2023 139  135 - 147 mmol/L Final   • Potassium 05/20/2023 3.3 (L)  3.5 - 5.3 mmol/L Final   • Chloride 05/20/2023 106  96 - 108 mmol/L Final   • CO2 05/20/2023 26  21 - 32 mmol/L Final   • ANION GAP 05/20/2023 7  4 - 13 mmol/L Final   • BUN 05/20/2023 24  5 - 25 mg/dL Final   • Creatinine 05/20/2023 0.85  0.60 - 1.30 mg/dL Final    Standardized to IDMS reference method   • Glucose 05/20/2023 117  65 - 140 mg/dL Final    If the patient is fasting, the ADA then defines impaired fasting glucose as > 100 mg/dL and diabetes as > or equal to 123 mg/dL. • Calcium 05/20/2023 9.3  8.4 - 10.2 mg/dL Final   • AST 05/20/2023 26  13 - 39 U/L Final   • ALT 05/20/2023 34  7 - 52 U/L Final    Specimen collection should occur prior to Sulfasalazine administration due to the potential for falsely depressed results.     • Alkaline Phosphatase 05/20/2023 240 (H)  34 - 104 U/L Final   • Total Protein 05/20/2023 6.2 (L)  6.4 - 8.4 g/dL Final   • Albumin 05/20/2023 3.5  3.5 - 5.0 g/dL Final   • Total Bilirubin 05/20/2023 0.82  0.20 - 1.00 mg/dL Final    Use of this assay is not recommended for patients undergoing treatment with eltrombopag due to the potential for falsely elevated results. N-acetyl-p-benzoquinone imine (metabolite of Acetaminophen) will generate erroneously low results in samples for patients that have taken an overdose of Acetaminophen. • eGFR 05/20/2023 77  ml/min/1.73sq m Final   There may be more visits with results that are not included.          Meliza Vargas PA-C  Rheumatology

## 2023-07-20 NOTE — TELEPHONE ENCOUNTER
Rec'd call from patient and her . STAT derm referral was placed on 5/31/2023 for a lump on patients breast.    Explained wait list/[facility] and stated that I would return call when I had an appointment. After further researching chart - patient has dx mammo scheduled at [facility] for next week. Will await results of mammo prior to contacting patient/ (may not need to wait for derm - possible gen surg referral instead - PCP believes cyst but with patients history is concerned with cancer.)    Attempted to return call to patient/ - no answer/no voicemail option. Will await results of next weeks mammo before attempting to contact patient again.

## 2023-07-20 NOTE — PRE-PROCEDURE INSTRUCTIONS
Pre-Surgery Instructions:   Medication Instructions   • acetaminophen (TYLENOL) 500 mg tablet Take Day of Surgery; Continue to take as prescribed including DOS with a small sip of water, unless usually taken at night   • amLODIPine (NORVASC) 10 mg tablet Take Day of Surgery; Continue to take as prescribed including DOS with a small sip of water, unless usually taken at night   • cyanocobalamin (VITAMIN B-12) 1000 MCG tablet Avoid 1 week prior to surgery    • cyclobenzaprine (FLEXERIL) 10 mg tablet Take Day of Surgery; Continue to take as prescribed including DOS with a small sip of water, unless usually taken at night   • oxyCODONE (Roxicodone) 5 immediate release tablet Take Day of Surgery; Continue to take as prescribed including DOS with a small sip of water, unless usually taken at night   • potassium chloride (MICRO-K) 10 MEQ CR capsule Do not take day of surgery; continue as prescribed excluding DOS   • predniSONE 10 mg tablet Take Day of Surgery; Continue to take as prescribed including DOS with a small sip of water, unless usually taken at night   • ursodiol (ACTIGALL) 300 mg capsule Do not take day of surgery; continue as prescribed excluding DOS    Medication instructions for day surgery reviewed. Please use only a sip of water to take your instructed medications. Avoid all over the counter vitamins, supplements and NSAIDS for one week prior to surgery per anesthesia guidelines. Tylenol is ok to take as needed. You will receive a call one business day prior to surgery with an arrival time and hospital directions. If your surgery is scheduled on a Monday, the hospital will be calling you on the Friday prior to your surgery. If you have not heard from anyone by 8pm, please call the hospital supervisor through the hospital  at [phone]. Leigh Saeid [phone]). Do not eat or drink anything after midnight the night before your surgery, including candy, mints, lifesavers, or chewing gum.  Do not drink alcohol 24hrs before your surgery. Try not to smoke at least 24hrs before your surgery. Follow the pre surgery showering instructions as listed in the [facility] Surgical Experience Booklet” or otherwise provided by your surgeon's office. Do not shave the surgical area 24 hours before surgery. Do not apply any lotions, creams, including makeup, cologne, deodorant, or perfumes after showering on the day of your surgery. No contact lenses, eye make-up, or artificial eyelashes. Remove nail polish, including gel polish, and any artificial, gel, or acrylic nails if possible. Remove all jewelry including rings and body piercing jewelry. Wear causal clothing that is easy to take on and off. Consider your type of surgery. Keep any valuables, jewelry, piercings at home. Please bring any specially ordered equipment (sling, braces) if indicated. Arrange for a responsible person to drive you to and from the hospital on the day of your surgery. Visitor Guidelines discussed. Call the surgeon's office with any new illnesses, exposures, or additional questions prior to surgery. Please reference your [facility] Surgical Experience Booklet” for additional information to prepare for your upcoming surgery.

## 2023-07-21 ENCOUNTER — ANESTHESIA (OUTPATIENT)
Dept: PERIOP | Facility: AMBULARY SURGERY CENTER | Age: 58
End: 2023-07-21
Payer: COMMERCIAL

## 2023-07-21 ENCOUNTER — HOSPITAL ENCOUNTER (OUTPATIENT)
Facility: AMBULARY SURGERY CENTER | Age: 58
Setting detail: OUTPATIENT SURGERY
Discharge: HOME | End: 2023-07-21
Attending: UROLOGY | Admitting: UROLOGY
Payer: COMMERCIAL

## 2023-07-21 ENCOUNTER — APPOINTMENT (OUTPATIENT)
Dept: RADIOLOGY | Facility: AMBULARY SURGERY CENTER | Age: 58
End: 2023-07-21
Payer: COMMERCIAL

## 2023-07-21 VITALS
WEIGHT: 154 LBS | SYSTOLIC BLOOD PRESSURE: 132 MMHG | RESPIRATION RATE: 16 BRPM | BODY MASS INDEX: 28.34 KG/M2 | HEIGHT: 62 IN | HEART RATE: 91 BPM | DIASTOLIC BLOOD PRESSURE: 61 MMHG | OXYGEN SATURATION: 98 % | TEMPERATURE: 97.2 F

## 2023-07-21 DIAGNOSIS — N13.5 URETERAL STRICTURE: ICD-10-CM

## 2023-07-21 DIAGNOSIS — D86.89 HEPATIC GRANULOMA ASSOCIATED WITH SARCOIDOSIS: ICD-10-CM

## 2023-07-21 DIAGNOSIS — N20.1 CALCULUS OF URETER: ICD-10-CM

## 2023-07-21 DIAGNOSIS — N20.0 KIDNEY CALCULI: Primary | ICD-10-CM

## 2023-07-21 PROCEDURE — 52356 CYSTO/URETERO W/LITHOTRIPSY: CPT | Performed by: UROLOGY

## 2023-07-21 PROCEDURE — C2617 STENT, NON-COR, TEM W/O DEL: HCPCS | Performed by: UROLOGY

## 2023-07-21 PROCEDURE — C1758 CATHETER, URETERAL: HCPCS | Performed by: UROLOGY

## 2023-07-21 PROCEDURE — NC001 PR NO CHARGE: Performed by: UROLOGY

## 2023-07-21 PROCEDURE — 74420 UROGRAPHY RTRGR +-KUB: CPT

## 2023-07-21 PROCEDURE — 82360 CALCULUS ASSAY QUANT: CPT | Performed by: UROLOGY

## 2023-07-21 PROCEDURE — C1769 GUIDE WIRE: HCPCS | Performed by: UROLOGY

## 2023-07-21 DEVICE — STENT URETERAL 7FR 24CM INLAY OPTIMA W/HYDROGLIDE GDWR: Type: IMPLANTABLE DEVICE | Site: URETER | Status: FUNCTIONAL

## 2023-07-21 DEVICE — INLAY OPTIMA URETERAL STENT W/O GUIDEWIRE
Type: IMPLANTABLE DEVICE | Site: URETER | Status: FUNCTIONAL
Brand: BARD® INLAY OPTIMA® URETERAL STENT

## 2023-07-21 RX ORDER — PHENYLEPHRINE HCL IN 0.9% NACL 1 MG/10 ML
SYRINGE (ML) INTRAVENOUS AS NEEDED
Status: DISCONTINUED | OUTPATIENT
Start: 2023-07-21 | End: 2023-07-21

## 2023-07-21 RX ORDER — ONDANSETRON 2 MG/ML
INJECTION INTRAMUSCULAR; INTRAVENOUS AS NEEDED
Status: DISCONTINUED | OUTPATIENT
Start: 2023-07-21 | End: 2023-07-21

## 2023-07-21 RX ORDER — DOCUSATE SODIUM 100 MG/1
100 CAPSULE, LIQUID FILLED ORAL 2 TIMES DAILY
Qty: 30 CAPSULE | Refills: 0 | Status: SHIPPED | OUTPATIENT
Start: 2023-07-21 | End: 2023-08-05

## 2023-07-21 RX ORDER — ONDANSETRON 2 MG/ML
4 INJECTION INTRAMUSCULAR; INTRAVENOUS ONCE AS NEEDED
Status: DISCONTINUED | OUTPATIENT
Start: 2023-07-21 | End: 2023-07-21 | Stop reason: HOSPADM

## 2023-07-21 RX ORDER — KETOROLAC TROMETHAMINE 30 MG/ML
30 INJECTION, SOLUTION INTRAMUSCULAR; INTRAVENOUS ONCE
Status: COMPLETED | OUTPATIENT
Start: 2023-07-21 | End: 2023-07-21

## 2023-07-21 RX ORDER — DEXMEDETOMIDINE HYDROCHLORIDE 100 UG/ML
INJECTION, SOLUTION INTRAVENOUS AS NEEDED
Status: DISCONTINUED | OUTPATIENT
Start: 2023-07-21 | End: 2023-07-21

## 2023-07-21 RX ORDER — PROPOFOL 10 MG/ML
INJECTION, EMULSION INTRAVENOUS AS NEEDED
Status: DISCONTINUED | OUTPATIENT
Start: 2023-07-21 | End: 2023-07-21

## 2023-07-21 RX ORDER — OXYCODONE HYDROCHLORIDE 5 MG/1
5 TABLET ORAL EVERY 4 HOURS PRN
Qty: 5 TABLET | Refills: 0 | Status: SHIPPED | OUTPATIENT
Start: 2023-07-21 | End: 2023-07-26

## 2023-07-21 RX ORDER — MIDAZOLAM HYDROCHLORIDE 2 MG/2ML
INJECTION, SOLUTION INTRAMUSCULAR; INTRAVENOUS AS NEEDED
Status: DISCONTINUED | OUTPATIENT
Start: 2023-07-21 | End: 2023-07-21

## 2023-07-21 RX ORDER — ACETAMINOPHEN 325 MG/1
650 TABLET ORAL EVERY 4 HOURS PRN
Qty: 30 TABLET | Refills: 0
Start: 2023-07-21 | End: 2023-08-01

## 2023-07-21 RX ORDER — MAGNESIUM HYDROXIDE 1200 MG/15ML
LIQUID ORAL AS NEEDED
Status: DISCONTINUED | OUTPATIENT
Start: 2023-07-21 | End: 2023-07-21 | Stop reason: HOSPADM

## 2023-07-21 RX ORDER — SODIUM CHLORIDE, SODIUM LACTATE, POTASSIUM CHLORIDE, CALCIUM CHLORIDE 600; 310; 30; 20 MG/100ML; MG/100ML; MG/100ML; MG/100ML
INJECTION, SOLUTION INTRAVENOUS CONTINUOUS PRN
Status: DISCONTINUED | OUTPATIENT
Start: 2023-07-21 | End: 2023-07-21

## 2023-07-21 RX ORDER — FENTANYL CITRATE/PF 50 MCG/ML
25 SYRINGE (ML) INJECTION
Status: DISCONTINUED | OUTPATIENT
Start: 2023-07-21 | End: 2023-07-21 | Stop reason: HOSPADM

## 2023-07-21 RX ORDER — NAPROXEN 500 MG/1
500 TABLET ORAL 2 TIMES DAILY WITH MEALS
Qty: 10 TABLET | Refills: 0 | Status: SHIPPED | OUTPATIENT
Start: 2023-07-21 | End: 2023-08-01

## 2023-07-21 RX ORDER — HYDROMORPHONE HCL/PF 1 MG/ML
0.5 SYRINGE (ML) INJECTION ONCE
Status: COMPLETED | OUTPATIENT
Start: 2023-07-21 | End: 2023-07-21

## 2023-07-21 RX ORDER — LIDOCAINE HYDROCHLORIDE 20 MG/ML
INJECTION, SOLUTION EPIDURAL; INFILTRATION; INTRACAUDAL; PERINEURAL AS NEEDED
Status: DISCONTINUED | OUTPATIENT
Start: 2023-07-21 | End: 2023-07-21

## 2023-07-21 RX ORDER — PHENAZOPYRIDINE HYDROCHLORIDE 200 MG/1
200 TABLET, FILM COATED ORAL 3 TIMES DAILY PRN
Qty: 10 TABLET | Refills: 0 | Status: SHIPPED | OUTPATIENT
Start: 2023-07-21 | End: 2023-07-24

## 2023-07-21 RX ORDER — OXYCODONE HYDROCHLORIDE 5 MG/1
5 TABLET ORAL EVERY 4 HOURS PRN
Status: DISCONTINUED | OUTPATIENT
Start: 2023-07-21 | End: 2023-07-21 | Stop reason: HOSPADM

## 2023-07-21 RX ORDER — TAMSULOSIN HYDROCHLORIDE 0.4 MG/1
0.4 CAPSULE ORAL
Qty: 30 CAPSULE | Refills: 3 | Status: SHIPPED | OUTPATIENT
Start: 2023-07-21 | End: 2023-08-01

## 2023-07-21 RX ORDER — OXYBUTYNIN CHLORIDE 5 MG/1
5 TABLET ORAL EVERY 6 HOURS PRN
Qty: 30 TABLET | Refills: 0 | Status: SHIPPED | OUTPATIENT
Start: 2023-07-21

## 2023-07-21 RX ORDER — CEFAZOLIN SODIUM 1 G/50ML
1000 SOLUTION INTRAVENOUS
Status: COMPLETED | OUTPATIENT
Start: 2023-07-21 | End: 2023-07-21

## 2023-07-21 RX ORDER — FENTANYL CITRATE 50 UG/ML
INJECTION, SOLUTION INTRAMUSCULAR; INTRAVENOUS AS NEEDED
Status: DISCONTINUED | OUTPATIENT
Start: 2023-07-21 | End: 2023-07-21

## 2023-07-21 RX ORDER — OXYBUTYNIN CHLORIDE 5 MG/1
10 TABLET ORAL ONCE
Status: COMPLETED | OUTPATIENT
Start: 2023-07-21 | End: 2023-07-21

## 2023-07-21 RX ADMIN — FENTANYL CITRATE 50 MCG: 50 INJECTION INTRAMUSCULAR; INTRAVENOUS at 09:16

## 2023-07-21 RX ADMIN — ONDANSETRON 4 MG: 2 INJECTION INTRAMUSCULAR; INTRAVENOUS at 09:08

## 2023-07-21 RX ADMIN — LIDOCAINE HYDROCHLORIDE 100 MG: 20 INJECTION, SOLUTION EPIDURAL; INFILTRATION; INTRACAUDAL; PERINEURAL at 09:08

## 2023-07-21 RX ADMIN — MIDAZOLAM 2 MG: 1 INJECTION INTRAMUSCULAR; INTRAVENOUS at 09:08

## 2023-07-21 RX ADMIN — DEXMEDETOMIDINE HYDROCHLORIDE 8 MCG: 100 INJECTION, SOLUTION INTRAVENOUS at 09:59

## 2023-07-21 RX ADMIN — SODIUM CHLORIDE, SODIUM LACTATE, POTASSIUM CHLORIDE, AND CALCIUM CHLORIDE: .6; .31; .03; .02 INJECTION, SOLUTION INTRAVENOUS at 08:58

## 2023-07-21 RX ADMIN — FENTANYL CITRATE 25 MCG: 50 INJECTION INTRAMUSCULAR; INTRAVENOUS at 10:45

## 2023-07-21 RX ADMIN — OXYBUTYNIN CHLORIDE 10 MG: 5 TABLET ORAL at 12:06

## 2023-07-21 RX ADMIN — CEFAZOLIN SODIUM 1000 MG: 1 SOLUTION INTRAVENOUS at 09:02

## 2023-07-21 RX ADMIN — FENTANYL CITRATE 25 MCG: 50 INJECTION INTRAMUSCULAR; INTRAVENOUS at 10:50

## 2023-07-21 RX ADMIN — HYDROMORPHONE HYDROCHLORIDE 0.5 MG: 1 INJECTION, SOLUTION INTRAMUSCULAR; INTRAVENOUS; SUBCUTANEOUS at 12:03

## 2023-07-21 RX ADMIN — PROPOFOL 200 MG: 10 INJECTION, EMULSION INTRAVENOUS at 09:08

## 2023-07-21 RX ADMIN — DEXMEDETOMIDINE HYDROCHLORIDE 8 MCG: 100 INJECTION, SOLUTION INTRAVENOUS at 09:35

## 2023-07-21 RX ADMIN — FENTANYL CITRATE 25 MCG: 50 INJECTION INTRAMUSCULAR; INTRAVENOUS at 10:40

## 2023-07-21 RX ADMIN — KETOROLAC TROMETHAMINE 30 MG: 30 INJECTION, SOLUTION INTRAMUSCULAR at 12:06

## 2023-07-21 RX ADMIN — FENTANYL CITRATE 50 MCG: 50 INJECTION INTRAMUSCULAR; INTRAVENOUS at 09:28

## 2023-07-21 RX ADMIN — Medication 100 MCG: at 09:13

## 2023-07-21 NOTE — OP NOTE
Operative Note     PATIENT:  Tim Varghese (MRN [id number])    DATE OF PROCEDURE:   7/21/2023    PRE-OP DIAGNOSIS:   #1 complex bilateral nephrolithiasis  2. Right ureteral stricture  3. Indwelling bilateral ureteral stents  4. Advanced rectal cancer pending surgical resection    POST-OP DIAGNOSIS:   #1 complex bilateral nephrolithiasis  2. Right ureteral stricture  3. Indwelling bilateral ureteral stents  4. Advanced rectal cancer pending surgical resection    PROCEDURES PERFORMED:  1) Cystoscopy  2) bilateral retrograde pyelography with fluoroscopic interpretation  3) Left ureteroscopy with laser lithotripsy and basket extraction of stone  4) bilateral ureteral stent exchange    SURGEON:  Monique Cho MD    NOTE:  There were no qualified teaching residents to assist with this case    ANESTHESIA: General     COMPLICATIONS:   None    ANTIBIOTICS:  Ancef    INTRAOPERATIVE THROMBOEMBOLISM PROPHYLAXIS:  Pneumatic compression stockings     FINDINGS:  -Successful removal of the remainder of the stones present within the left ureter  - There is persistent high-grade stricturing of the right proximal ureter. Stent exchange was very difficult. I was initially unable to thread a wire alongside the preplaced stent due to the dense nature of the stricture.  -We will plan to maintain the patient's ureteral stents through the time of her upcoming colorectal surgical resection.  -Following that procedure we will plan for repeat imaging with a likely plan to remove her left ureteral stent and maintain her right ureteral stent chronically.  -The severity of her right proximal ureteral stricture disease I do not believe she is a candidate for any attempted endoscopic correction    INDICATIONS FOR PROCEDURE:  Tim Varghese is an 56 y.o. old female with A longstanding history of nephrolithiasis. She is status post prior ureteroscopic intervention.   A dense right proximal ureteral stricture was visualized at the time of prior ureteroscopy's and she has been maintained with ureteral stents since that time. Unresected advanced colorectal cancer and is pending an upcoming surgical resection next month. Patient has after discussing the options, the patient elected to undergo ureteroscopy and ureteral stent placement. We discussed the procedure in detail, the alternatives, and the risks, and they signed informed consent to proceed. PROCEDURE IN DETAIL:   The patient was identified and brought to the OR. Antibiotic prophylaxis and DVT prophylaxis were administered. They were placed in the comfortable dorsal lithotomy position with care to pad all pressure points. They were prepped and draped in the usual sterile fashion using hibiclens. A surgical time out was performed with all in the room in agreement with the correct patient, procedure, indications, and laterality. A 21-[place] citizen rigid cystoscope was used to enter the bladder. The bladder was inspected in its entirety and there were no lesions noted. The ureteral orifices were identified in their normal orthotopic positions. The Left ureteral orifice was identified and a 5 Fr open ended catheter was placed into the ureteral orifice alongside the preplaced ureteral stent which was then removed. A retrograde pyelogram was performed with the findings as described above. A Sensor wire was advanced up to the kidney under fluoroscopic guidance. Leaving this safety wire in place, the bladder was drained. A   7.5 [place] citizen semi-rigid ureteroscope was advanced up the ureter under vision . The stone was encountered in the proximal ureter. The stone was not noted to be impacted. A holmium laser fiber was passed through the ureteroscope and laser lithotripsy was commenced at settings of 0.7 J and 7 Hz. The stones were fragmented to very small pieces.       Once I was satisfied that the stone was fragmented to dust, a 1.9 [place] zero tip nitinol basket was passed through the ureteroscope. The residual fragments were basketed out and removed. I performed a stent exchange on the contralateral right side. I initially attempted to thread a wire alongside the previous stent. This was very difficult/impossible due to dense and progressive stricture disease in the proximal ureter. I attempted to thread a wire through the preplaced stent but this was impossible due to mild encrustation. Ultimately I was able to place a wire into the collecting system by simultaneously removing the preplaced stent. A 7 [place] stent is again selected for maximum drainage and placed in the standard fashion. The patient was placed back supine, awakened from general anesthesia and brought to recovery room in stable condition. ESTIMATED BLOOD LOSS:  Minimal      SPECIMENS:   Order Name Source Comment Collection Info Order Time   STONE ANALYSIS Ureter, Left  Collected By: Nicolasa Villa MD 7/21/2023  9:38 AM        IMPLANTS:   Implant Name Type Inv. Item Serial No.  Lot No. LRB No. Used Action   STENT URETERAL 6FR 24CML [employer]in [employer]  STENT URETERAL 6FR 24C[employer] [facility] DIVISION [id number] Left 1 Implanted   STENT URETERAL 7FR [address] [facility] [address] [place] [address] [id number]  STENT URETERAL 7FR 24CM [facility] DIVISION [id number] Right 1 Implanted        COMPLICATIONS: None    DISPOSITION: PACU    PLAN:  -We will maintain the patient's bilateral ureteral stents through the time of her upcoming rectal cancer resection  - I will schedule follow-up with my team after recovery from that procedure. We will obtain a repeat CT scan and schedule the patient for next endoscopic procedure (likely removal of the left ureteral stent and exchange of the right ureteral stent)  - Due to the severe and progressive nature of her right proximal ureteral stricture she is not a candidate for any attempted endoscopic repair.   We will likely recommend chronic stent exchanges versus consideration of referral for reconstruction or potentially a nephrectomy pending her overall clinical and oncologic course.

## 2023-07-21 NOTE — ANESTHESIA POSTPROCEDURE EVALUATION
Post-Op Assessment Note    CV Status:  Stable  Pain Score: 0    Pain management: adequate     Mental Status:  Awake and alert   Hydration Status:  Stable   PONV Controlled:  None   Airway Patency:  Patent and adequate      Post Op Vitals Reviewed: Yes      Staff: CRNA, Anesthesiologist         No notable events documented.     BP   157/80   Temp  97   Pulse  84   Resp   15   SpO2   100%

## 2023-07-21 NOTE — ANESTHESIA PREPROCEDURE EVALUATION
Procedure:  CYSTOSCOPY RETROGRADE PYELOGRAM WITH STENT URETERAL EXCHANGE (Bilateral: Bladder)    Relevant Problems   CARDIO   (+) Chest pain   (+) Hypertension   (+) Nonrheumatic mitral valve regurgitation      GI/HEPATIC   (+) Malignant neoplasm of sigmoid colon (HCC)   (+) Rectal cancer (HCC)      /RENAL   (+) Kidney calculi   (+) R flank pain with bilateral hydronephrosis      HEMATOLOGY   (+) Other specified anemias   (+) Thrombocytopenia (HCC)      Other   (+) Port-A-Cath in place        Physical Exam    Airway    Mallampati score: II  TM Distance: >3 FB  Neck ROM: full     Dental       Cardiovascular      Pulmonary      Other Findings        Anesthesia Plan  ASA Score- 3     Anesthesia Type- IV sedation with anesthesia with ASA Monitors. Additional Monitors:   Airway Plan:     Comment: Possible GA w/ LMA. Plan Factors-Exercise tolerance (METS): >4 METS. Chart reviewed. Existing labs reviewed. Patient summary reviewed. Induction- intravenous. Postoperative Plan-     Informed Consent- Anesthetic plan and risks discussed with patient. I personally reviewed this patient with the CRNA. Discussed and agreed on the Anesthesia Plan with the CRNA. Jack Davenport

## 2023-07-21 NOTE — DISCHARGE INSTR - AVS FIRST PAGE
Valentina Slade:    Your surgery went very well. A small collection of stones was removed from your left ureter. Your left stent was exchanged    I also exchanged your right stent. We will maintain both stents up through your upcoming surgery with Dr. Joanna Mcmullen. I will then have you see medical team back after you recover from that procedure to obtain repeat imaging and plan for your next procedure. I am hoping to remove your left ureteral stent though I suspect we may need to maintain the right ureteral stent on a long-term basis given the degree of scar tissue within your right ureter. Please take your medications as prescribed with caution for comfort. Most importantly please drink 6-8 glasses of water per day    Please call with any questions or concerns. Demetrius Devlin MD,PhD  [id number] [facility]  [phone]            WHAT IS A STENT? At the end of the procedure, your doctor may place a stent into your ureter. A stent is a thin, flexible piece of plastic that will hold open your ureter while the remaining small pieces of stone pass. This allows your kidney to drain easily and prevents you from having to “pass” these small stone pieces on your own, which could be painful. The stent is about 12 inches long and looks and feels like a thin piece of spaghetti. AFTER THE PROCEDURE  After the procedure you may experience the following symptoms. All of these are normal and should resolve within 1 or 2 days after your stent is removed. Urinary frequency (urinating more often than usual)  Urinary urgency (the sensation that you need to urinate right away)  Painful urination (this can be pain in your bladder or in your back when  you urinate)  Blood in your urine ( a stent can irritate the lining of your bladder causing it to bleed)  Back/Flank pain, especially with urination  You will receive a prescription for narcotic pain medication after the procedure.  You will also receive a prescription for tamsulosin which you will take once a day for 2 weeks to help relax your ureter and decrease stent discomfort. You will also need to purchase a stool softener (i.e. Colace) or mild laxative (i.e. Miralax) as the narcotic pain medication can make you constipated. This is important as constipation can exacerbate stent related symptoms.

## 2023-07-21 NOTE — H&P
UROLOGY HISTORY AND PHYSICAL     Patient Identifiers: Alcides Batres (MRN [id number])      Date of Service: 7/21/2023        ASSESSMENT:     56 y.o. old female with  complex stone disease, right proximal ureteral stricture, uncomplicated left distal ureteral calculi, and unresected colon cancer    I reviewed her recent CT scan obtained to restage her colon cancer. She is scheduled for a colectomy next month    I recommended moving forward with ureteroscopic removal of her left distal ureteral calculi with left ureteral stent exchange,, and we will plan for a simple right ureteral stent exchange. Kiran Curry PLAN:     2 OR for cystoscopy, left ureteroscopy, bilateral ureteral stent exchange    After the patient's upcoming colectomy we will obtain a CT to restage her stone burden and potentially remove her left ureteral stent.   I expect that she will be stent dependent on the right side due to the dense proximal stricture which developed as a consequence of untreated stone disease for greater than 1 year      History of Present Illness:     Alcides Batres is a 56 y.o. old with a history of plaque stone disease    Past Medical, Past Surgical History:     Past Medical History:   Diagnosis Date   • Cancer [facility])    • Colon cancer ([address])    • Fall    • Headache    • Hemochromatosis, unspecified    • History of transfusion     2022 - no adverse reaction   • Hypertension    • Kidney calculi    • Kidney stone    • Liver disease     hemangioma   • Muscle weakness    • Personal history of COVID-19 12/2022   • Sarcoidosis    • Seizures ([address])     2022   :    Past Surgical History:   Procedure Laterality Date   • ABSCESS DRAINAGE      left breast   • CHEST TUBE INSERTION     • COLON SURGERY      colostomy   • COLONOSCOPY     • FL GUIDED CENTRAL VENOUS ACCESS DEVICE INSERTION  3/21/2023   • FL RETROGRADE PYELOGRAM  1/23/2023   • FL RETROGRADE PYELOGRAM  4/3/2023   • IR BIOPSY LIVER MASS  5/9/2023   • LUNG BIOPSY     • W[place] CYSTO/URETERO W/LITHOTRIPSY &INDWELL STENT INSRT Bilateral 1/23/2023    Procedure: CYSTOSCOPY  RIGHT URETEROSCOPY WITH LITHOTRIPSY HOLMIUM LASER, BILATERAL RETROGRADE PYELOGRAM AND BILATERAL  URETERAL STENT INSERTION;  Surgeon: Samy Arrington MD;  Location[facility];  Service: Urology   • [place] CYSTO/URETERO W/LITHOTRIPSY &INDWELL STENT INSRT Right 4/3/2023    Procedure: RIGHT URETEROSCOPY WITH LITHOTRIPSY HOLMIUM LASER, RETROGRADE PYELOGRAM; BILATERAL EXCHANGE STENT URETERAL;  Surgeon: Samy Arrington MD;  [facility];  Service: Urology   • WA INSJ TUNNELED CTR VAD W/SUBQ PORT AGE 5 YR/> N/A 3/21/2023    Procedure: INSERTION VENOUS PORT ( PORT-A-CATH) IR;  Surgeon: Tino Ramirez DO;  [facility];  Service: Interventional Radiology   • TONSILLECTOMY     • URINARY SURGERY Bilateral     bilateral stents   :    Medications, Allergies:   No current facility-administered medications for this encounter. Allergies: Allergies   Allergen Reactions   • Oxaliplatin Shortness Of Breath     Reactions occurred with 2nd and 3rd infusions (about 1-3 hours from initiation of infusion) and required treatment with steroids and antihistamines. Please refer to allergy note on 2/7/2023 for detailed description of her reactions.   :    Social and Family History:   Social History:   Social History     Tobacco Use   • Smoking status: Never     Passive exposure: Past   • Smokeless tobacco: Never   Vaping Use   • Vaping Use: Never used   Substance Use Topics   • Alcohol use: Never   • Drug use: Never   . Social History     Tobacco Use   Smoking Status Never   • Passive exposure: Past   Smokeless Tobacco Never       Family History:  Family History   Problem Relation Age of Onset   • Cancer Mother    • Cirrhosis Father    :     Review of Systems:     General: Fever, chills, or night sweats: negative  Cardiac: Negative for chest pain. Pulmonary: Negative for shortness of breath.   Gastrointestinal: Abdominal pain negative  Nausea, vomiting, or diarrhea negative  Genitourinary: See HPI above. Patient does nothave hematuria. All other systems queried were negative. Physical Exam:   General: Patient is pleasant and in NAD. Awake and alert  /74   Pulse 93   Temp (!) 97 °F (36.1 °C) (Temporal)   Resp 18   Ht 5' 2" (1.575 m)   Wt 69.9 kg (154 lb)   SpO2 98%   BMI 28.17 kg/m²   HEENT:  Normocephalic atraumatic  Cardiac:  Regular rate and rhythm, Peripheral edema: negative  Pulmonary: Non-labored breathing, CTAB  Abdomen: Soft, non-tender, non-distended. No surgical scars. No masses, tenderness, hernias noted. Genitourinary: negative CVA tenderness, neg suprapubic tenderness. Extremities: normal movement in all 4       Labs:     Lab Results   Component Value Date    HGB 12.2 07/11/2023    HCT 39.2 07/11/2023    WBC 8.07 07/11/2023     07/11/2023   ]    Lab Results   Component Value Date    K 3.9 07/11/2023     (H) 07/11/2023    CO2 27 07/11/2023    BUN 32 (H) 07/11/2023    CREATININE 0.95 07/11/2023    CALCIUM 9.5 07/11/2023   ]    Imaging:   I personally reviewed the images and report of the following studies, and reviewed them with the patient:        Thank you for allowing me to participate in this patients’ care. Please do not hesitate to call with any additional questions.   Louisa Freitas MD

## 2023-07-24 ENCOUNTER — PATIENT OUTREACH (OUTPATIENT)
Dept: CASE MANAGEMENT | Facility: OTHER | Age: 58
End: 2023-07-24

## 2023-07-24 NOTE — TELEPHONE ENCOUNTER
Called and spoke to patiens . Explained that I wanted to wait for mammo results before getting him scheduled with Dermatology. He verbalized understanding.

## 2023-07-26 ENCOUNTER — HOSPITAL ENCOUNTER (OUTPATIENT)
Dept: MAMMOGRAPHY | Facility: CLINIC | Age: 58
Discharge: HOME | End: 2023-07-26
Payer: COMMERCIAL

## 2023-07-26 ENCOUNTER — HOSPITAL ENCOUNTER (OUTPATIENT)
Dept: ULTRASOUND IMAGING | Facility: CLINIC | Age: 58
Discharge: HOME | End: 2023-07-26
Payer: COMMERCIAL

## 2023-07-26 ENCOUNTER — DOCUMENTATION (OUTPATIENT)
Dept: HEMATOLOGY ONCOLOGY | Facility: CLINIC | Age: 58
End: 2023-07-26

## 2023-07-26 ENCOUNTER — APPOINTMENT (OUTPATIENT)
Dept: LAB | Facility: AMBULARY SURGERY CENTER | Age: 58
End: 2023-07-26
Payer: COMMERCIAL

## 2023-07-26 VITALS — HEIGHT: 62 IN | WEIGHT: 154.1 LBS | BODY MASS INDEX: 28.36 KG/M2

## 2023-07-26 DIAGNOSIS — C20 RECTAL CANCER (HCC): Primary | ICD-10-CM

## 2023-07-26 DIAGNOSIS — N63.24 MASS OF LOWER INNER QUADRANT OF LEFT BREAST: ICD-10-CM

## 2023-07-26 DIAGNOSIS — D86.89 HEPATIC GRANULOMA ASSOCIATED WITH SARCOIDOSIS: ICD-10-CM

## 2023-07-26 DIAGNOSIS — R70.0 ELEVATED SEDIMENTATION RATE: ICD-10-CM

## 2023-07-26 DIAGNOSIS — R92.8 ABNORMAL ULTRASOUND OF BREAST: ICD-10-CM

## 2023-07-26 LAB
CRP SERPL QL: 6.3 MG/L
ERYTHROCYTE [SEDIMENTATION RATE] IN BLOOD: 29 MM/HOUR (ref 0–29)
INR PPP: 0.89 (ref 0.84–1.19)
PROTHROMBIN TIME: 12.3 SECONDS (ref 11.6–14.5)
RHEUMATOID FACT SER QL LA: NEGATIVE

## 2023-07-26 PROCEDURE — 85610 PROTHROMBIN TIME: CPT

## 2023-07-26 PROCEDURE — 83520 IMMUNOASSAY QUANT NOS NONAB: CPT

## 2023-07-26 PROCEDURE — 36415 COLL VENOUS BLD VENIPUNCTURE: CPT

## 2023-07-26 PROCEDURE — 86430 RHEUMATOID FACTOR TEST QUAL: CPT | Performed by: PHYSICIAN ASSISTANT

## 2023-07-26 PROCEDURE — 86140 C-REACTIVE PROTEIN: CPT

## 2023-07-26 PROCEDURE — 86200 CCP ANTIBODY: CPT | Performed by: PHYSICIAN ASSISTANT

## 2023-07-26 PROCEDURE — 85652 RBC SED RATE AUTOMATED: CPT

## 2023-07-26 PROCEDURE — 76642 ULTRASOUND BREAST LIMITED: CPT

## 2023-07-26 PROCEDURE — 77066 DX MAMMO INCL CAD BI: CPT

## 2023-07-26 PROCEDURE — 86037 ANCA TITER EACH ANTIBODY: CPT

## 2023-07-26 PROCEDURE — 82164 ANGIOTENSIN I ENZYME TEST: CPT

## 2023-07-26 PROCEDURE — 86235 NUCLEAR ANTIGEN ANTIBODY: CPT | Performed by: PHYSICIAN ASSISTANT

## 2023-07-26 NOTE — PROGRESS NOTES
In-basket message received from Samira Barriga RN to add patient to [facility] on 8/24/2023. Chart reviewed and prep started. Pending final surgical pathology on 8/17/23.

## 2023-07-27 LAB
ACE SERPL-CCNC: 40 U/L (ref 14–82)
CALCIUM OXALATE DIHYDRATE MFR STONE IR: 50 %
COLOR STONE: NORMAL
COM MFR STONE: 40 %
COMMENT-STONE3: NORMAL
COMPOSITION: NORMAL
ENA SS-A AB SER-ACNC: 0.4 AI (ref 0–0.9)
ENA SS-B AB SER-ACNC: <0.2 AI (ref 0–0.9)
HYDROXYAPATITE 24H ENGDIFF UR: 10 %
LABORATORY COMMENT REPORT: NORMAL
PHOTO: NORMAL
SIZE STONE: NORMAL MM
SPEC SOURCE SUBJ: NORMAL
STONE ANALYSIS-IMP: NORMAL
WT STONE: 32 MG

## 2023-07-28 ENCOUNTER — DOCUMENTATION (OUTPATIENT)
Dept: HEMATOLOGY ONCOLOGY | Facility: CLINIC | Age: 58
End: 2023-07-28

## 2023-07-28 LAB
C-ANCA TITR SER IF: NORMAL TITER
CCP AB SER IA-ACNC: 1
MYELOPEROXIDASE AB SER IA-ACNC: <0.2 UNITS (ref 0–0.9)
P-ANCA ATYPICAL TITR SER IF: NORMAL TITER
P-ANCA TITR SER IF: NORMAL TITER
PROTEINASE3 AB SER IA-ACNC: <0.2 UNITS (ref 0–0.9)

## 2023-07-28 NOTE — PROGRESS NOTES
Provider will be out of office on 8/10/2023. Presentation moved to next available [facility] scheduled on 8/17/2023 Khris Salmon RN notified.

## 2023-07-30 PROBLEM — Z00.00 HEALTHCARE MAINTENANCE: Status: RESOLVED | Noted: 2023-03-15 | Resolved: 2023-07-30

## 2023-08-01 ENCOUNTER — TELEPHONE (OUTPATIENT)
Dept: RHEUMATOLOGY | Facility: CLINIC | Age: 58
End: 2023-08-01

## 2023-08-01 NOTE — PRE-PROCEDURE INSTRUCTIONS
Pre-Surgery Instructions:   Medication Instructions   • acetaminophen (TYLENOL) 500 mg tablet Uses PRN- OK to take day of surgery   • amLODIPine (NORVASC) 10 mg tablet Take day of surgery. • cyanocobalamin (VITAMIN B-12) 1000 MCG tablet Stop taking 7 days prior to surgery. • docusate sodium (COLACE) 100 mg capsule Hold day of surgery. • oxybutynin (DITROPAN) 5 mg tablet Hold day of surgery. • oxyCODONE (Roxicodone) 5 immediate release tablet Uses PRN- DO NOT take day of surgery   • potassium chloride (MICRO-K) 10 MEQ CR capsule Hold day of surgery. • predniSONE 10 mg tablet Take day of surgery. • ursodiol (ACTIGALL) 300 mg capsule Take day of surgery. Review with patient and Halley Licona via phone medications and showering instructions. Instructed to avoid all ASA and OTC Vit/Supp 1 week prior to surgery and to avoid NSAIDs 3 days prior to surgery per anesthesia instructions. Tylenol ok to take prn. [facility] call with surgery schedule time, nothing eat or drink after midnight. Verbalized understanding. Aware of bowel prep instructions from surgeon office.

## 2023-08-02 ENCOUNTER — TELEPHONE (OUTPATIENT)
Age: 58
End: 2023-08-02

## 2023-08-02 NOTE — TELEPHONE ENCOUNTER
Pt's  called with questions for PT's upcoming procedures with Dr. Nas Duran. I was able to answer some but other would be best answered by a surgery coordinator.     Please call at [phone] when possible

## 2023-08-03 ENCOUNTER — PATIENT OUTREACH (OUTPATIENT)
Dept: CASE MANAGEMENT | Facility: OTHER | Age: 58
End: 2023-08-03

## 2023-08-03 NOTE — PROGRESS NOTES
OSW placed outreach TC to pt this afternoon to introduce self. Pts ex- Og Rutledge answered and stated he was outside, but OSW could speak with him. OSW introduced self as pts new SW. Og Rutledge asked what it is we provide and OSW educated on our role. Og Rutledge states that she is scheduled for her surgery on 8/17. He states that she does not have a great deal of energy and they are hopeful that her health improves after the surgery. Og Lirianonora thanked this writer for [employer]. OSW encouraged him to call with any needs in the future. OSW will close the referral at this time. If any needs present in the future another referral can be placed at that time.

## 2023-08-05 DIAGNOSIS — E87.6 HYPOKALEMIA: ICD-10-CM

## 2023-08-07 ENCOUNTER — APPOINTMENT (EMERGENCY)
Dept: CT IMAGING | Facility: HOSPITAL | Age: 58
End: 2023-08-07
Payer: COMMERCIAL

## 2023-08-07 ENCOUNTER — HOSPITAL ENCOUNTER (EMERGENCY)
Facility: HOSPITAL | Age: 58
Discharge: HOME | End: 2023-08-07
Attending: EMERGENCY MEDICINE | Admitting: EMERGENCY MEDICINE
Payer: COMMERCIAL

## 2023-08-07 VITALS
DIASTOLIC BLOOD PRESSURE: 81 MMHG | RESPIRATION RATE: 16 BRPM | TEMPERATURE: 98.2 F | OXYGEN SATURATION: 98 % | HEART RATE: 108 BPM | SYSTOLIC BLOOD PRESSURE: 166 MMHG

## 2023-08-07 DIAGNOSIS — E87.6 HYPOKALEMIA: ICD-10-CM

## 2023-08-07 DIAGNOSIS — R51.9 HEADACHE: ICD-10-CM

## 2023-08-07 DIAGNOSIS — B02.9 HERPES ZOSTER: Primary | ICD-10-CM

## 2023-08-07 DIAGNOSIS — E04.1 THYROID NODULE: ICD-10-CM

## 2023-08-07 LAB
2HR DELTA HS TROPONIN: 0 NG/L
ALBUMIN SERPL BCP-MCNC: 4.2 G/DL (ref 3.5–5)
ALP SERPL-CCNC: 246 U/L (ref 34–104)
ALT SERPL W P-5'-P-CCNC: 54 U/L (ref 7–52)
ANION GAP SERPL CALCULATED.3IONS-SCNC: 9 MMOL/L
AST SERPL W P-5'-P-CCNC: 26 U/L (ref 13–39)
BASOPHILS # BLD MANUAL: 0 THOUSAND/UL (ref 0–0.1)
BASOPHILS NFR MAR MANUAL: 0 % (ref 0–1)
BILIRUB SERPL-MCNC: 0.96 MG/DL (ref 0.2–1)
BUN SERPL-MCNC: 24 MG/DL (ref 5–25)
CALCIUM SERPL-MCNC: 10 MG/DL (ref 8.4–10.2)
CARDIAC TROPONIN I PNL SERPL HS: 11 NG/L
CARDIAC TROPONIN I PNL SERPL HS: 11 NG/L
CHLORIDE SERPL-SCNC: 103 MMOL/L (ref 96–108)
CO2 SERPL-SCNC: 27 MMOL/L (ref 21–32)
CREAT SERPL-MCNC: 0.9 MG/DL (ref 0.6–1.3)
CRP SERPL QL: 7.9 MG/L
EOSINOPHIL # BLD MANUAL: 0 THOUSAND/UL (ref 0–0.4)
EOSINOPHIL NFR BLD MANUAL: 0 % (ref 0–6)
ERYTHROCYTE [DISTWIDTH] IN BLOOD BY AUTOMATED COUNT: 14.5 % (ref 11.6–15.1)
ERYTHROCYTE [SEDIMENTATION RATE] IN BLOOD: 69 MM/HOUR (ref 0–29)
GFR SERPL CREATININE-BSD FRML MDRD: 72 ML/MIN/1.73SQ M
GLUCOSE SERPL-MCNC: 217 MG/DL (ref 65–140)
HCT VFR BLD AUTO: 40.1 % (ref 34.8–46.1)
HGB BLD-MCNC: 12.8 G/DL (ref 11.5–15.4)
LYMPHOCYTES # BLD AUTO: 0.41 THOUSAND/UL (ref 0.6–4.47)
LYMPHOCYTES # BLD AUTO: 4 % (ref 14–44)
MCH RBC QN AUTO: 30.3 PG (ref 26.8–34.3)
MCHC RBC AUTO-ENTMCNC: 31.9 G/DL (ref 31.4–37.4)
MCV RBC AUTO: 95 FL (ref 82–98)
MONOCYTES # BLD AUTO: 0 THOUSAND/UL (ref 0–1.22)
MONOCYTES NFR BLD: 0 % (ref 4–12)
NEUTROPHILS # BLD MANUAL: 9.79 THOUSAND/UL (ref 1.85–7.62)
NEUTS BAND NFR BLD MANUAL: 1 % (ref 0–8)
NEUTS SEG NFR BLD AUTO: 95 % (ref 43–75)
PLATELET # BLD AUTO: 204 THOUSANDS/UL (ref 149–390)
PLATELET BLD QL SMEAR: ADEQUATE
PMV BLD AUTO: 8.9 FL (ref 8.9–12.7)
POTASSIUM SERPL-SCNC: 4.3 MMOL/L (ref 3.5–5.3)
PROT SERPL-MCNC: 7.4 G/DL (ref 6.4–8.4)
RBC # BLD AUTO: 4.23 MILLION/UL (ref 3.81–5.12)
RBC MORPH BLD: NORMAL
SODIUM SERPL-SCNC: 139 MMOL/L (ref 135–147)
WBC # BLD AUTO: 10.2 THOUSAND/UL (ref 4.31–10.16)

## 2023-08-07 PROCEDURE — 70491 CT SOFT TISSUE NECK W/DYE: CPT

## 2023-08-07 PROCEDURE — 84484 ASSAY OF TROPONIN QUANT: CPT | Performed by: EMERGENCY MEDICINE

## 2023-08-07 PROCEDURE — 86140 C-REACTIVE PROTEIN: CPT

## 2023-08-07 PROCEDURE — G1004 CDSM NDSC: HCPCS

## 2023-08-07 PROCEDURE — 80053 COMPREHEN METABOLIC PANEL: CPT | Performed by: EMERGENCY MEDICINE

## 2023-08-07 PROCEDURE — 99284 EMERGENCY DEPT VISIT MOD MDM: CPT

## 2023-08-07 PROCEDURE — 85652 RBC SED RATE AUTOMATED: CPT

## 2023-08-07 PROCEDURE — 86787 VARICELLA-ZOSTER ANTIBODY: CPT

## 2023-08-07 PROCEDURE — 85027 COMPLETE CBC AUTOMATED: CPT | Performed by: EMERGENCY MEDICINE

## 2023-08-07 PROCEDURE — 70450 CT HEAD/BRAIN W/O DYE: CPT

## 2023-08-07 PROCEDURE — 85007 BL SMEAR W/DIFF WBC COUNT: CPT | Performed by: EMERGENCY MEDICINE

## 2023-08-07 PROCEDURE — 87798 DETECT AGENT NOS DNA AMP: CPT

## 2023-08-07 PROCEDURE — 36415 COLL VENOUS BLD VENIPUNCTURE: CPT

## 2023-08-07 RX ORDER — VALACYCLOVIR HYDROCHLORIDE 500 MG/1
1000 TABLET, FILM COATED ORAL ONCE
Status: COMPLETED | OUTPATIENT
Start: 2023-08-07 | End: 2023-08-07

## 2023-08-07 RX ORDER — POTASSIUM CHLORIDE 750 MG/1
40 CAPSULE, EXTENDED RELEASE ORAL 2 TIMES DAILY
Qty: 240 CAPSULE | Refills: 1 | Status: SHIPPED | OUTPATIENT
Start: 2023-08-07 | End: 2023-08-07 | Stop reason: SDUPTHER

## 2023-08-07 RX ORDER — ACYCLOVIR 800 MG/1
800 TABLET ORAL
Qty: 35 TABLET | Refills: 0 | Status: ON HOLD | OUTPATIENT
Start: 2023-08-07 | End: 2023-08-14

## 2023-08-07 RX ORDER — VALACYCLOVIR HYDROCHLORIDE 500 MG/1
1000 TABLET, FILM COATED ORAL EVERY 8 HOURS SCHEDULED
Status: DISCONTINUED | OUTPATIENT
Start: 2023-08-07 | End: 2023-08-07

## 2023-08-07 RX ORDER — OXYCODONE HYDROCHLORIDE 10 MG/1
10 TABLET ORAL ONCE
Status: COMPLETED | OUTPATIENT
Start: 2023-08-07 | End: 2023-08-07

## 2023-08-07 RX ORDER — ACYCLOVIR 800 MG/1
800 TABLET ORAL
Qty: 35 TABLET | Refills: 0 | Status: SHIPPED | OUTPATIENT
Start: 2023-08-07 | End: 2023-08-07 | Stop reason: SDUPTHER

## 2023-08-07 RX ORDER — POTASSIUM CHLORIDE 750 MG/1
40 CAPSULE, EXTENDED RELEASE ORAL 2 TIMES DAILY
Qty: 240 CAPSULE | Refills: 0 | Status: ON HOLD | OUTPATIENT
Start: 2023-08-07 | End: 2023-09-06

## 2023-08-07 RX ADMIN — VALACYCLOVIR 1000 MG: 500 TABLET, FILM COATED ORAL at 14:42

## 2023-08-07 RX ADMIN — IOHEXOL 85 ML: 350 INJECTION, SOLUTION INTRAVENOUS at 15:22

## 2023-08-07 RX ADMIN — OXYCODONE HYDROCHLORIDE 10 MG: 10 TABLET ORAL at 17:01

## 2023-08-07 NOTE — ED PROVIDER NOTES
History  Chief Complaint   Patient presents with   • Headache     Reports headache x4days, no relief with tylenol. Hx colon cancer. Denies n/v/vision changes     HPI 56-year-old female with history of colon cancer s/p chemo and radiation therapy, sarcoidosis, hypertension, hemangioma presents to the emergency department for evaluation of left-sided headache worsening x4 days that she describes as intermittent and sharp 'zapping' sensation, pain behind her eye, and bumps on the left side of her head and behind her ear that have developed within the last couple of days. She has been taking 1000 mg of Tylenol every couple of hours which is not been helpful. She is also noticed some swelling over the left clavicle that has also appeared within the last couple of days, tender to the touch. She denies any trauma to the area. She denies any recent fevers. Did have some nausea associated with the headache, but no vomiting. She denies chest pain or abdominal pain, no shortness of breath. Prior to Admission Medications   Prescriptions Last Dose Informant Patient Reported?  Taking?   acetaminophen (TYLENOL) 500 mg tablet  Self No No   Sig: Take 2 tablets (1,000 mg total) by mouth 3 (three) times a day   amLODIPine (NORVASC) 10 mg tablet  Self No No   Sig: Take 1 tablet (10 mg total) by mouth daily   Patient taking differently: Take 20 mg by mouth daily   cyanocobalamin (VITAMIN B-12) 1000 MCG tablet  Self Yes No   Sig: Take 1,000 mcg by mouth daily   docusate sodium (COLACE) 100 mg capsule  Self No No   Sig: Take 1 capsule (100 mg total) by mouth 2 (two) times a day for 15 days   docusate sodium (COLACE) 100 mg capsule   No No   Sig: Take 1 capsule (100 mg total) by mouth 2 (two) times a day for 15 days   oxyCODONE (Roxicodone) 5 immediate release tablet  Self No No   Sig: Take 1-2 tablets (5-10 mg total) by mouth every 4 (four) hours as needed for moderate pain or severe pain Max Daily Amount: 60 mg   oxybutynin (DITROPAN) 5 mg tablet   No No   Sig: Take 1 tablet (5 mg total) by mouth every 6 (six) hours as needed (bladder spasms)   predniSONE 10 mg tablet  Self No No   Sig: Take 4 tablets (40 mg total) by mouth daily Please take for 4 weeks and then decrease by 5 mg increments every 2 weeks. Patient taking differently: Take 30 mg by mouth daily Please take for 4 weeks and then decrease by 5 mg increments every 2 weeks.  -- Taking 20mg and then 10mg - titrating   ursodiol (ACTIGALL) 300 mg capsule  Self No No   Sig: Take 3 capsules (900 mg total) by mouth in the morning      Facility-Administered Medications: None       Past Medical History:   Diagnosis Date   • Cancer ([address])    • Colon cancer ([address])    • Fall    • Headache    • Hemochromatosis, unspecified    • History of transfusion     2022 - no adverse reaction   • Hypertension    • Kidney calculi    • Kidney stone    • Liver disease     hemangioma   • Muscle weakness    • Personal history of COVID-19 12/2022   • Sarcoidosis    • Seizures ([address])     2022       Past Surgical History:   Procedure Laterality Date   • ABSCESS DRAINAGE      left breast   • BREAST BIOPSY     • CHEST TUBE INSERTION     • COLON SURGERY      colostomy   • COLONOSCOPY     • FL GUIDED CENTRAL VENOUS ACCESS DEVICE INSERTION  03/21/2023   • FL RETROGRADE PYELOGRAM  01/23/2023   • FL RETROGRADE PYELOGRAM  04/03/2023   • FL RETROGRADE PYELOGRAM  7/21/2023   • IR BIOPSY LIVER MASS  05/09/2023   • LUNG BIOPSY     • MT CYSTO BLADDER W/URETERAL CATHETERIZATION Bilateral 07/21/2023    Procedure: CYSTOSCOPY URETEROSCOPY RETROGRADE PYELOGRAM WITH BILATERAL STENT URETERAL EXCHANGE; LEFT LASER LITHOTRIPSY AND STONE BASKET RETRIEVAL;  Surgeon: Erinn Rivas MD;  Location: [facility] MAIN OR;  Service: Urology   • MT CYSTO/URETERO W/LITHOTRIPSY &INDWELL STENT INSRT Bilateral 01/23/2023    Procedure: CYSTOSCOPY  RIGHT URETEROSCOPY WITH LITHOTRIPSY HOLMIUM LASER, BILATERAL RETROGRADE PYELOGRAM AND BILATERAL URETERAL STENT INSERTION;  Surgeon: Gerber Tran MD;  Location: [facility] Main [facility];  Service: Urology   • CT CYSTO/URETERO W/LITHOTRIPSY &INDWELL STENT INSRT Right 04/03/2023    Procedure: RIGHT URETEROSCOPY WITH LITHOTRIPSY HOLMIUM LASER, RETROGRADE PYELOGRAM; BILATERAL EXCHANGE STENT URETERAL;  Surgeon: Gerber Tran MD;  Location: [facility];  Service: Urology   • CT INSJ TUNNELED CTR VAD W/SUBQ PORT AGE 5 YR/> N/A 03/21/2023    Procedure: INSERTION VENOUS PORT ( PORT-A-CATH) IR;  Surgeon: Armond He DO;  Location: [facility];  Service: Interventional Radiology   • TONSILLECTOMY     • URINARY SURGERY Bilateral     bilateral stents       Family History   Problem Relation Age of Onset   • Cancer Mother    • Cirrhosis Father    • Breast cancer Neg Hx    • Breast cancer additional onset Neg Hx      I have reviewed and agree with the history as documented. E-Cigarette/Vaping   • E-Cigarette Use Never User      E-Cigarette/Vaping Substances   • Nicotine No    • THC No    • CBD No    • Flavoring No    • Other No    • Unknown No      Social History     Tobacco Use   • Smoking status: Never     Passive exposure: Past   • Smokeless tobacco: Never   Vaping Use   • Vaping Use: Never used   Substance Use Topics   • Alcohol use: Never   • Drug use: Never        Review of Systems   Constitutional: Negative for chills and fever. HENT: Negative for ear pain and sore throat. Eyes: Positive for pain (behind left eye). Negative for visual disturbance. Respiratory: Negative for cough and shortness of breath. Cardiovascular: Negative for chest pain and leg swelling. Gastrointestinal: Negative for abdominal pain, blood in stool, constipation, diarrhea, nausea and vomiting. Genitourinary: Negative for dysuria and hematuria. Musculoskeletal: Negative for neck pain and neck stiffness. Skin: Positive for rash. Neurological: Positive for headaches.  Negative for dizziness, syncope, weakness and light-headedness. All other systems reviewed and are negative. Physical Exam  ED Triage Vitals [08/07/23 1311]   Temperature Pulse Respirations Blood Pressure SpO2   98.2 °F (36.8 °C) (!) 108 16 166/81 98 %      Temp Source Heart Rate Source Patient Position - Orthostatic VS BP Location FiO2 (%)   Oral Monitor Sitting Right arm --      Pain Score       --             Orthostatic Vital Signs  Vitals:    08/07/23 1311   BP: 166/81   Pulse: (!) 108   Patient Position - Orthostatic VS: Sitting       Physical Exam  Vitals and nursing note reviewed. Constitutional:       General: She is not in acute distress. Appearance: She is well-developed. She is not ill-appearing, toxic-appearing or diaphoretic. Comments: Look uncomfortable from pain but is not in acute distress   HENT:      Head: Normocephalic and atraumatic. Mouth/Throat:      Mouth: Mucous membranes are moist.      Pharynx: Oropharynx is clear. No oropharyngeal exudate. Comments: No lesions around or in the mouth  Eyes:      Extraocular Movements: Extraocular movements intact. Conjunctiva/sclera: Conjunctivae normal.      Pupils: Pupils are equal, round, and reactive to light. Cardiovascular:      Rate and Rhythm: Normal rate and regular rhythm. Pulses: Normal pulses. Heart sounds: Normal heart sounds. No murmur heard. Pulmonary:      Effort: Pulmonary effort is normal. No respiratory distress. Breath sounds: Normal breath sounds. Chest:       Abdominal:      Palpations: Abdomen is soft. Tenderness: There is no abdominal tenderness. There is no guarding or rebound. Musculoskeletal:         General: No swelling. Cervical back: Normal range of motion and neck supple. No rigidity or tenderness. Skin:     General: Skin is warm and dry. Capillary Refill: Capillary refill takes less than 2 seconds. Findings: Rash present. Neurological:      General: No focal deficit present.       Mental Status: She is alert and oriented to person, place, and time. GCS: GCS eye subscore is 4. GCS verbal subscore is 5. GCS motor subscore is 6. Cranial Nerves: Cranial nerves 2-12 are intact. No cranial nerve deficit, dysarthria or facial asymmetry. Sensory: Sensation is intact. No sensory deficit. Motor: Motor function is intact. No weakness. Coordination: Coordination normal.   Psychiatric:         Mood and Affect: Mood is anxious.                      ED Medications  Medications   valACYclovir (VALTREX) tablet 1,000 mg (1,000 mg Oral Given 8/7/23 1442)   iohexol (OMNIPAQUE) 350 MG/ML injection (SINGLE-DOSE) 85 mL (85 mL Intravenous Given 8/7/23 1522)   oxyCODONE (ROXICODONE) immediate release tablet 10 mg (10 mg Oral Given 8/7/23 1701)       Diagnostic Studies  Results Reviewed     Procedure Component Value Units Date/Time    Varicella zoster antibody, IgM [[id number]] Collected: 08/07/23    Lab Status: Final result Specimen: Blood Updated: 08/09/23 2006     Varicella IgM <0.91 index     Narrative:      Performed at:  [address]  [id number]  : Yvonne Contreras MD, Phone:  [phone]    Varicella Zoster Virus DNA,PCR [[id number]] Collected: 08/07/23    Lab Status:  In process Specimen: Blood Updated: 08/07/23 1704    HS Troponin I 2hr [[id number]]  (Normal) Collected: 08/07/23 1607    Lab Status: Final result Specimen: Blood from Line, Venous Updated: 08/07/23 1654     hs TnI 2hr 11 ng/L      Delta 2hr hsTnI 0 ng/L     Sedimentation rate, automated [[id number]]  (Abnormal) Collected: 08/07/23 1317    Lab Status: Final result Specimen: Blood from Arm, Left Updated: 08/07/23 1534     Sed Rate 69 mm/hour     RBC Morphology Reflex Test [[id number]] Collected: 08/07/23 1317    Lab Status: Final result Specimen: Blood from Arm, Left Updated: 08/07/23 1501    C-reactive protein [[id number]]  (Abnormal) Collected: 08/07/23 1317    Lab Status: Final result Specimen: Blood from Arm, Left Updated: 08/07/23 1445     CRP 7.9 mg/L     HS Troponin 0hr (reflex protocol) [[id number]]  (Normal) Collected: 08/07/23 1317    Lab Status: Final result Specimen: Blood from Arm, Left Updated: 08/07/23 1443     hs TnI 0hr 11 ng/L     Comprehensive metabolic panel [[id number]]  (Abnormal) Collected: 08/07/23 1317    Lab Status: Final result Specimen: Blood from Arm, Left Updated: 08/07/23 1421     Sodium 139 mmol/L      Potassium 4.3 mmol/L      Chloride 103 mmol/L      CO2 27 mmol/L      ANION GAP 9 mmol/L      BUN 24 mg/dL      Creatinine 0.90 mg/dL      Glucose 217 mg/dL      Calcium 10.0 mg/dL      AST 26 U/L      ALT 54 U/L      Alkaline Phosphatase 246 U/L      Total Protein 7.4 g/dL      Albumin 4.2 g/dL      Total Bilirubin 0.96 mg/dL      eGFR 72 ml/min/1.73sq m     Narrative:      [place] guidelines for Chronic Kidney Disease (CKD):   •  Stage 1 with normal or high GFR (GFR > 90 mL/min/1.73 square meters)  •  Stage 2 Mild CKD (GFR = 60-89 mL/min/1.73 square meters)  •  Stage 3A Moderate CKD (GFR = 45-59 mL/min/1.73 square meters)  •  Stage 3B Moderate CKD (GFR = 30-44 mL/min/1.73 square meters)  •  Stage 4 Severe CKD (GFR = 15-29 mL/min/1.73 square meters)  •  Stage 5 End Stage CKD (GFR <15 mL/min/1.73 square meters)  Note: GFR calculation is accurate only with a steady state creatinine    CBC and differential [[id number]]  (Abnormal) Collected: 08/07/23 1317    Lab Status: Final result Specimen: Blood from Arm, Left Updated: 08/07/23 1412     WBC 10.20 Thousand/uL      RBC 4.23 Million/uL      Hemoglobin 12.8 g/dL      Hematocrit 40.1 %      MCV 95 fL      MCH 30.3 pg      MCHC 31.9 g/dL      RDW 14.5 %      MPV 8.9 fL      Platelets 187 Thousands/uL     Narrative: This is an appended report. These results have been appended to a previously verified report.     Manual Differential(PHLEBS Do Not Order) [[id number]]  (Abnormal) Collected: 08/07/23 1317    Lab Status: Final result Specimen: Blood from Arm, Left Updated: 08/07/23 1412     Segmented % 95 %      Bands % 1 %      Lymphocytes % 4 %      Monocytes % 0 %      Eosinophils, % 0 %      Basophils % 0 %      Absolute Neutrophils 9.79 Thousand/uL      Lymphocytes Absolute 0.41 Thousand/uL      Monocytes Absolute 0.00 Thousand/uL      Eosinophils Absolute 0.00 Thousand/uL      Basophils Absolute 0.00 Thousand/uL      Total Counted --     RBC Morphology Normal     Platelet Estimate Adequate                 CT head without contrast   Final Result by Julia Rosales MD (08/07 0784)      No acute intracranial abnormality. Workstation performed: [id number]         CT soft tissue neck w contrast   Final Result by Horton Riedel, MD (08/07 7256)      No acute neck abnormality, suspicious neck mass or cervical lymphadenopathy. Unchanged chronic healed fracture deformity of left mid clavicle. Similar interstitial nodularity in visualized right upper lobe is likely due to patient's known sarcoidosis. Bilateral thyroid nodules (right larger than left). Yearly follow-up is recommended on thyroid ultrasound dated 3/25/2023. Additional chronic/incidental findings as detailed above. The study was marked in [facility] for immediate notification. Workstation performed: [id number]               Procedures  Procedures      ED Course  ED Course as of 08/10/23 0943   Mon Aug 07, 2023   137 51-year-old female presents with intermittently zapping sensation over her left scalp and behind her left eye that has been present for 4 days, exam consistent with herpes zoster outbreak, will send HSV PCR. Outbreak in the setting of immunosuppression for colon cancer s/p radiation and chemo therapy. Did recommend sending a HIV lab, but patient refused, does not want.     1519 Alkaline Phosphatase(!): 246  Has been persistently elevated, most recent was 244 twelve days ago   1519 C-REACTIVE PROTEIN(!): 7.9  Has been elevated, most recent was 12 days ago @ 6.3   1520 ALT(!): 54  Down from most recent twelve days ago (66)   1629 Sed Rate(!): 69  Elevated from twelve days ago when it was 29   1636 IMPRESSION:     No acute intracranial abnormality. 1652 IMPRESSION:     No acute neck abnormality, suspicious neck mass or cervical lymphadenopathy.     Unchanged chronic healed fracture deformity of left mid clavicle.     Similar interstitial nodularity in visualized right upper lobe is likely due to patient's known sarcoidosis.     Bilateral thyroid nodules (right larger than left). Yearly follow-up is recommended on thyroid ultrasound dated 3/25/2023.     Additional chronic/incidental findings as detailed above.     The study was marked in [facility] for immediate notification.     Workstation performed: [id number]   6689 Discussed CT findings with the patient. She is aware of thyroid nodules, she tells me she has been getting yearly ultrasounds. She is encouraged to follow-up with her PCP to schedule repeat exam.   1657 Delta 2hr hsTnI: 0                             SBIRT 22yo+    Flowsheet Row Most Recent Value   Initial Alcohol Screen: US AUDIT-C     1. How often do you have a drink containing alcohol? 0 Filed at: 08/07/2023 1448   2. How many drinks containing alcohol do you have on a typical day you are drinking? 0 Filed at: 08/07/2023 1448   3a. Male UNDER 65: How often do you have five or more drinks on one occasion? 0 Filed at: 08/07/2023 1448   3b. FEMALE Any Age, or MALE 65+: How often do you have 4 or more drinks on one occassion? 0 Filed at: 08/07/2023 1448   Audit-C Score 0 Filed at: 08/07/2023 1448   ENRIQUE: How many times in the past year have you. .. Used an illegal drug or used a prescription medication for non-medical reasons?  Never Filed at: 08/07/2023 1448                Medical Decision Making  32-year-old female with history of colon cancer s/p chemo and radiation therapy, sarcoidosis, hypertension, hemangioma presents to the emergency department for evaluation of left-sided headache worsening x4 days that she describes as intermittent and sharp 'zapping' sensation, pain behind her eye, and bumps on the left side of her head and behind her ear that have developed within the last couple of days. Her presentation is most consistent with herpes zoster outbreak as the rash is unilateral, follows a dermatomal distribution, is grouped vesicular rash. Her inflammatory markers are mildly elevated. She is started on Valtrex, given first dose here in the department. She also requests her home dose of oxycodone, which is given in the department. Discussion of strict return precautions. Encouraged to follow-up with PCP as needed. Discharged home in stable condition, she is picked up by her ex-, she does not drive home herself. Amount and/or Complexity of Data Reviewed  Labs: ordered. Decision-making details documented in ED Course. Radiology: ordered. Risk  Prescription drug management. Disposition  Final diagnoses:   Herpes zoster   Headache   Thyroid nodule     Time reflects when diagnosis was documented in both MDM as applicable and the Disposition within this note     Time User Action Codes Description Comment    8/7/2023  4:59 PM Tonie Goldmann Add [B02.9] Herpes zoster     8/7/2023  4:59 PM Beata Glynnmann Add [R51.9] Headache     8/7/2023  5:03 PM Tonie Goldmann Add [E04.1] Thyroid nodule     8/7/2023  7:15 PM Alejandrina Simon Add [E87.6] Hypokalemia       ED Disposition     ED Disposition   Discharge    Condition   Stable    Date/Time   Mon Aug 7, 2023  5:00 PM    Comment   Alcides Batres discharge to home/self care.                Follow-up Information     Follow up With Specialties Details Why Contact Info    Tati Valencia MD Internal Medicine, Geriatric Medicine Schedule an appointment as soon as possible for a visit  to discuss CT neck findings and planning for thyroid ultrasound as well as new herpes zoster infection and repeat evaluation [address]  [phone]            Discharge Medication List as of 8/7/2023  5:07 PM      START taking these medications    Details   acyclovir (ZOVIRAX) 800 mg tablet Take 1 tablet (800 mg total) by mouth 5 (five) times a day for 7 days For shingles, Starting Mon 8/7/2023, Until Mon 8/14/2023, Print         CONTINUE these medications which have NOT CHANGED    Details   acetaminophen (TYLENOL) 500 mg tablet Take 2 tablets (1,000 mg total) by mouth 3 (three) times a day, Starting Wed 5/24/2023, Normal      amLODIPine (NORVASC) 10 mg tablet Take 1 tablet (10 mg total) by mouth daily, Starting Wed 7/19/2023, Normal      cyanocobalamin (VITAMIN B-12) 1000 MCG tablet Take 1,000 mcg by mouth daily, Historical Med      docusate sodium (COLACE) 100 mg capsule Take 1 capsule (100 mg total) by mouth 2 (two) times a day for 15 days, Starting Mon 4/3/2023, Until Tue 8/1/2023, Normal      docusate sodium (COLACE) 100 mg capsule Take 1 capsule (100 mg total) by mouth 2 (two) times a day for 15 days, Starting Fri 7/21/2023, Until Sat 8/5/2023, Normal      oxybutynin (DITROPAN) 5 mg tablet Take 1 tablet (5 mg total) by mouth every 6 (six) hours as needed (bladder spasms), Starting Fri 7/21/2023, Normal      oxyCODONE (Roxicodone) 5 immediate release tablet Take 1-2 tablets (5-10 mg total) by mouth every 4 (four) hours as needed for moderate pain or severe pain Max Daily Amount: 60 mg, Starting Sun 7/9/2023, Normal      predniSONE 10 mg tablet Take 4 tablets (40 mg total) by mouth daily Please take for 4 weeks and then decrease by 5 mg increments every 2 weeks. , Starting Fri 6/16/2023, Normal      ursodiol (ACTIGALL) 300 mg capsule Take 3 capsules (900 mg total) by mouth in the morning, Starting Fri 6/16/2023, Until Sat 6/15/2024, Normal      potassium chloride (MICRO-K) 10 MEQ CR capsule TAKE 4 CAPSULES (40 MEQ TOTAL) BY MOUTH 2 (TWO) TIMES A DAY, Starting Thu 6/8/2023, Until Tue 8/1/2023, Normal           No discharge procedures on file. PDMP Review       Value Time User    PDMP Reviewed  Yes 7/19/2023  3:08 PM Lizeth Horn MD           ED Provider  Attending physically available and evaluated Ismael Murry. I managed the patient along with the ED Attending.     Electronically Signed by         Wilfred Bingham MD  08/10/23 0607

## 2023-08-07 NOTE — DISCHARGE INSTRUCTIONS
Sending you home with antiviral medication which you will take 5 times a day for 7 days       CT soft tissue neck w contrast: No acute neck abnormality, suspicious neck mass or cervical lymphadenopathy., Unchanged chronic healed fracture deformity of left mid clavicle., Similar interstitial nodularity in visualized right upper lobe is likely due to patient's known sarcoidosis. , Bilateral thyroid nodules (right larger than left). Yearly follow-up is recommended on thyroid ultrasound dated 3/25/2023.,     DISCHARGE INSTRUCTIONS:   Call your local emergency number ([phone] in the [address]) if:   You have trouble moving your arms, legs, or face. You become confused, or have trouble speaking. You have a seizure. Return to the emergency department if:   You have weakness in an arm or leg. You have dizziness, a severe headache, or hearing or vision loss. You have painful, red, warm skin around the blisters, or the blisters drain pus. Your neck is stiff or you have trouble moving it.

## 2023-08-09 LAB — VZV IGM SER IA-ACNC: <0.91 INDEX (ref 0–0.9)

## 2023-08-10 ENCOUNTER — PATIENT MESSAGE (OUTPATIENT)
Dept: PALLIATIVE MEDICINE | Facility: CLINIC | Age: 58
End: 2023-08-10

## 2023-08-10 DIAGNOSIS — B02.9 SHINGLES: Primary | ICD-10-CM

## 2023-08-10 RX ORDER — GABAPENTIN 100 MG/1
200 CAPSULE ORAL 2 TIMES DAILY
Qty: 60 CAPSULE | Refills: 1 | Status: ON HOLD | OUTPATIENT
Start: 2023-08-10

## 2023-08-11 ENCOUNTER — OFFICE VISIT (OUTPATIENT)
Dept: INTERNAL MEDICINE CLINIC | Facility: CLINIC | Age: 58
End: 2023-08-11
Payer: COMMERCIAL

## 2023-08-11 VITALS
BODY MASS INDEX: 28.52 KG/M2 | SYSTOLIC BLOOD PRESSURE: 132 MMHG | HEART RATE: 83 BPM | WEIGHT: 155 LBS | OXYGEN SATURATION: 98 % | DIASTOLIC BLOOD PRESSURE: 74 MMHG | HEIGHT: 62 IN

## 2023-08-11 DIAGNOSIS — B02.9 HERPES ZOSTER WITHOUT COMPLICATION: Primary | ICD-10-CM

## 2023-08-11 LAB — VZV DNA SPEC QL NAA+PROBE: POSITIVE

## 2023-08-11 PROCEDURE — 99213 OFFICE O/P EST LOW 20 MIN: CPT | Performed by: INTERNAL MEDICINE

## 2023-08-11 RX ORDER — ACYCLOVIR 800 MG/1
800 TABLET ORAL
Qty: 15 TABLET | Refills: 0 | Status: ON HOLD | OUTPATIENT
Start: 2023-08-11 | End: 2023-08-14

## 2023-08-11 NOTE — PROGRESS NOTES
Name: Edwin Setting      : 1965      MRN: [id number]  Encounter Provider: Gonzalez Patricio MD  Encounter Date: 2023   Encounter department: [facility]    Assessment & Plan     1. Herpes zoster without complication  Assessment & Plan:  -Her rash is present in the C2/C3 distribution. Overall her pain has improved. I have given her a script for an additional 3 days of Acyclovir for a total of 10 days of treatment due to her immunocompromised state.   -Patient still has remaining oxycodone left from the ED for as needed use. -Recommended initiating gabapentin and discussed its benefits for treatment of neuropathic pain. Orders:  -     acyclovir (ZOVIRAX) 800 mg tablet; Take 1 tablet (800 mg total) by mouth 5 (five) times a day for 3 days           Subjective      HPI   Patient presents today for post ED follow-up. She initially presented complaining of headache with a sharp zapping sensation behind her eye. Okay she was seen in the emergency department on  and diagnosed with herpes zoster. She was started on acyclovir 800mg 5x a day for 7 days. Today, she reports the pain associated wit her rash has significantly improved. She denies any fevers, chills, or further spread of her rash. She is taking Oxycodone for pain. She has not started Gabapentin due to potential side effect concerns.      ROS as per HPI    Current Outpatient Medications on File Prior to Visit   Medication Sig   • acetaminophen (TYLENOL) 500 mg tablet Take 2 tablets (1,000 mg total) by mouth 3 (three) times a day   • acyclovir (ZOVIRAX) 800 mg tablet Take 1 tablet (800 mg total) by mouth 5 (five) times a day for 7 days For shingles   • amLODIPine (NORVASC) 10 mg tablet Take 1 tablet (10 mg total) by mouth daily (Patient taking differently: Take 20 mg by mouth daily)   • cyanocobalamin (VITAMIN B-12) 1000 MCG tablet Take 1,000 mcg by mouth daily   • gabapentin (Neurontin) 100 mg capsule Take 2 capsules (200 mg total) by mouth 2 (two) times a day To reduce shingles pain   • oxybutynin (DITROPAN) 5 mg tablet Take 1 tablet (5 mg total) by mouth every 6 (six) hours as needed (bladder spasms)   • oxyCODONE (Roxicodone) 5 immediate release tablet Take 1-2 tablets (5-10 mg total) by mouth every 4 (four) hours as needed for moderate pain or severe pain Max Daily Amount: 60 mg   • potassium chloride (MICRO-K) 10 MEQ CR capsule Take 4 capsules (40 mEq total) by mouth 2 (two) times a day   • predniSONE 10 mg tablet Take 4 tablets (40 mg total) by mouth daily Please take for 4 weeks and then decrease by 5 mg increments every 2 weeks. (Patient taking differently: Take 30 mg by mouth daily Please take for 4 weeks and then decrease by 5 mg increments every 2 weeks.  -- Taking 20mg and then 10mg - titrating)   • ursodiol (ACTIGALL) 300 mg capsule Take 3 capsules (900 mg total) by mouth in the morning   • docusate sodium (COLACE) 100 mg capsule Take 1 capsule (100 mg total) by mouth 2 (two) times a day for 15 days   • docusate sodium (COLACE) 100 mg capsule Take 1 capsule (100 mg total) by mouth 2 (two) times a day for 15 days       Objective     /74   Pulse 83   Ht 5' 2" (1.575 m)   Wt 70.3 kg (155 lb)   SpO2 98%   BMI 28.35 kg/m²     BP Readings from Last 3 Encounters:   08/11/23 132/74   08/07/23 166/81   07/21/23 132/61        Wt Readings from Last 3 Encounters:   08/11/23 70.3 kg (155 lb)   07/26/23 69.9 kg (154 lb 1.6 oz)   07/21/23 69.9 kg (154 lb)       Physical Exam    General: NAD  HEENT: NCAT, EOMI, normal conjunctiva, Left EAC clear   Cardiovascular: RRR, normal S1 and S2, no m/r/g  Pulmonary: Normal respiratory effort, no wheezes, rales or rhonchi  Extremities: No lower extremity edema  Skin: erythematous vesicular rash located over the left temporalis region, left auricle, and left anterior neck     Eusebio Nixon MD

## 2023-08-11 NOTE — H&P (VIEW-ONLY)
Name: Corinne Herrera      : 1965      MRN: [id number]  Encounter Provider: Nyla Norton MD  Encounter Date: 2023   Encounter department: [facility] [place]    Assessment & Plan     1. Herpes zoster without complication  Assessment & Plan:  -Her rash is present in the C2/C3 distribution. Overall her pain has improved. I have given her a script for an additional 3 days of Acyclovir for a total of 10 days of treatment due to her immunocompromised state.   -Patient still has remaining oxycodone left from the ED for as needed use. -Recommended initiating gabapentin and discussed its benefits for treatment of neuropathic pain. Orders:  -     acyclovir (ZOVIRAX) 800 mg tablet; Take 1 tablet (800 mg total) by mouth 5 (five) times a day for 3 days           Subjective      HPI   Patient presents today for post ED follow-up. She initially presented complaining of headache with a sharp zapping sensation behind her eye. Okay she was seen in the emergency department on  and diagnosed with herpes zoster. She was started on acyclovir 800mg 5x a day for 7 days. Today, she reports the pain associated wit her rash has significantly improved. She denies any fevers, chills, or further spread of her rash. She is taking Oxycodone for pain. She has not started Gabapentin due to potential side effect concerns.      ROS as per [facility]    Current Outpatient Medications on File Prior to Visit   Medication Sig   • acetaminophen (TYLENOL) 500 mg tablet Take 2 tablets (1,000 mg total) by mouth 3 (three) times a day   • acyclovir (ZOVIRAX) 800 mg tablet Take 1 tablet (800 mg total) by mouth 5 (five) times a day for 7 days For shingles   • amLODIPine (NORVASC) 10 mg tablet Take 1 tablet (10 mg total) by mouth daily (Patient taking differently: Take 20 mg by mouth daily)   • cyanocobalamin (VITAMIN B-12) 1000 MCG tablet Take 1,000 mcg by mouth daily   • gabapentin (Neurontin) 100 mg capsule Take 2 capsules (200 mg total) by mouth 2 (two) times a day To reduce shingles pain   • oxybutynin (DITROPAN) 5 mg tablet Take 1 tablet (5 mg total) by mouth every 6 (six) hours as needed (bladder spasms)   • oxyCODONE (Roxicodone) 5 immediate release tablet Take 1-2 tablets (5-10 mg total) by mouth every 4 (four) hours as needed for moderate pain or severe pain Max Daily Amount: 60 mg   • potassium chloride (MICRO-K) 10 MEQ CR capsule Take 4 capsules (40 mEq total) by mouth 2 (two) times a day   • predniSONE 10 mg tablet Take 4 tablets (40 mg total) by mouth daily Please take for 4 weeks and then decrease by 5 mg increments every 2 weeks. (Patient taking differently: Take 30 mg by mouth daily Please take for 4 weeks and then decrease by 5 mg increments every 2 weeks.  -- Taking 20mg and then 10mg - titrating)   • ursodiol (ACTIGALL) 300 mg capsule Take 3 capsules (900 mg total) by mouth in the morning   • docusate sodium (COLACE) 100 mg capsule Take 1 capsule (100 mg total) by mouth 2 (two) times a day for 15 days   • docusate sodium (COLACE) 100 mg capsule Take 1 capsule (100 mg total) by mouth 2 (two) times a day for 15 days       Objective     /74   Pulse 83   Ht 5' 2" (1.575 m)   Wt 70.3 kg (155 lb)   SpO2 98%   BMI 28.35 kg/m²     BP Readings from Last 3 Encounters:   08/11/23 132/74   08/07/23 166/81   07/21/23 132/61        Wt Readings from Last 3 Encounters:   08/11/23 70.3 kg (155 lb)   07/26/23 69.9 kg (154 lb 1.6 oz)   07/21/23 69.9 kg (154 lb)       Physical Exam    General: NAD  HEENT: NCAT, EOMI, normal conjunctiva, Left EAC clear   Cardiovascular: RRR, normal S1 and S2, no m/r/g  Pulmonary: Normal respiratory effort, no wheezes, rales or rhonchi  Extremities: No lower extremity edema  Skin: erythematous vesicular rash located over the left temporalis region, left auricle, and left anterior neck     Eusebio Moreira MD

## 2023-08-11 NOTE — PATIENT INSTRUCTIONS
-You will be given an additional 3 days of acyclovir to complete 10 days total of therapy. -You may apply topical lidocaine as needed.   -Consider starting gabapentin for nerve pain.

## 2023-08-12 PROBLEM — B02.9 HERPES ZOSTER WITHOUT COMPLICATION: Status: ACTIVE | Noted: 2023-08-12

## 2023-08-12 NOTE — ASSESSMENT & PLAN NOTE
-Her rash is present in the C2/C3 distribution. Overall her pain has improved. I have given her a script for an additional 3 days of Acyclovir for a total of 10 days of treatment due to her immunocompromised state.   -Patient still has remaining oxycodone left from the ED for as needed use. -Recommended initiating gabapentin and discussed its benefits for treatment of neuropathic pain.

## 2023-08-14 ENCOUNTER — HOSPITAL ENCOUNTER (OUTPATIENT)
Dept: INFUSION CENTER | Facility: CLINIC | Age: 58
Discharge: HOME | End: 2023-08-14
Payer: COMMERCIAL

## 2023-08-14 DIAGNOSIS — D86.89 HEPATIC GRANULOMA ASSOCIATED WITH SARCOIDOSIS: ICD-10-CM

## 2023-08-14 DIAGNOSIS — Z95.828 PORT-A-CATH IN PLACE: Primary | ICD-10-CM

## 2023-08-14 LAB
ALBUMIN SERPL BCP-MCNC: 3.8 G/DL (ref 3.5–5)
ALP SERPL-CCNC: 199 U/L (ref 34–104)
ALT SERPL W P-5'-P-CCNC: 48 U/L (ref 7–52)
ANION GAP SERPL CALCULATED.3IONS-SCNC: 11 MMOL/L
AST SERPL W P-5'-P-CCNC: 23 U/L (ref 13–39)
BASOPHILS # BLD AUTO: 0.01 THOUSANDS/ÂΜL (ref 0–0.1)
BASOPHILS NFR BLD AUTO: 0 % (ref 0–1)
BILIRUB SERPL-MCNC: 0.71 MG/DL (ref 0.2–1)
BUN SERPL-MCNC: 20 MG/DL (ref 5–25)
CALCIUM SERPL-MCNC: 9.6 MG/DL (ref 8.4–10.2)
CHLORIDE SERPL-SCNC: 104 MMOL/L (ref 96–108)
CO2 SERPL-SCNC: 24 MMOL/L (ref 21–32)
CREAT SERPL-MCNC: 0.86 MG/DL (ref 0.6–1.3)
CRP SERPL QL: 12.4 MG/L
EOSINOPHIL # BLD AUTO: 0.02 THOUSAND/ÂΜL (ref 0–0.61)
EOSINOPHIL NFR BLD AUTO: 0 % (ref 0–6)
ERYTHROCYTE [DISTWIDTH] IN BLOOD BY AUTOMATED COUNT: 14.6 % (ref 11.6–15.1)
ERYTHROCYTE [SEDIMENTATION RATE] IN BLOOD: 47 MM/HOUR (ref 0–29)
GFR SERPL CREATININE-BSD FRML MDRD: 76 ML/MIN/1.73SQ M
GLUCOSE SERPL-MCNC: 188 MG/DL (ref 65–140)
HCT VFR BLD AUTO: 34.3 % (ref 34.8–46.1)
HGB BLD-MCNC: 11 G/DL (ref 11.5–15.4)
IMM GRANULOCYTES # BLD AUTO: 0.08 THOUSAND/UL (ref 0–0.2)
IMM GRANULOCYTES NFR BLD AUTO: 1 % (ref 0–2)
INR PPP: 0.87 (ref 0.84–1.19)
LYMPHOCYTES # BLD AUTO: 0.32 THOUSANDS/ÂΜL (ref 0.6–4.47)
LYMPHOCYTES NFR BLD AUTO: 3 % (ref 14–44)
MCH RBC QN AUTO: 30.1 PG (ref 26.8–34.3)
MCHC RBC AUTO-ENTMCNC: 32.1 G/DL (ref 31.4–37.4)
MCV RBC AUTO: 94 FL (ref 82–98)
MONOCYTES # BLD AUTO: 0.27 THOUSAND/ÂΜL (ref 0.17–1.22)
MONOCYTES NFR BLD AUTO: 3 % (ref 4–12)
NEUTROPHILS # BLD AUTO: 9.06 THOUSANDS/ÂΜL (ref 1.85–7.62)
NEUTS SEG NFR BLD AUTO: 93 % (ref 43–75)
NRBC BLD AUTO-RTO: 0 /100 WBCS
PLATELET # BLD AUTO: 195 THOUSANDS/UL (ref 149–390)
PLATELET BLD QL SMEAR: ADEQUATE
PMV BLD AUTO: 9.1 FL (ref 8.9–12.7)
POTASSIUM SERPL-SCNC: 3.3 MMOL/L (ref 3.5–5.3)
PROT SERPL-MCNC: 6.9 G/DL (ref 6.4–8.4)
PROTHROMBIN TIME: 12.4 SECONDS (ref 11.6–14.5)
RBC # BLD AUTO: 3.66 MILLION/UL (ref 3.81–5.12)
RBC MORPH BLD: NORMAL
SODIUM SERPL-SCNC: 139 MMOL/L (ref 135–147)
WBC # BLD AUTO: 9.76 THOUSAND/UL (ref 4.31–10.16)

## 2023-08-14 PROCEDURE — 85610 PROTHROMBIN TIME: CPT

## 2023-08-14 PROCEDURE — 86140 C-REACTIVE PROTEIN: CPT

## 2023-08-14 PROCEDURE — 80053 COMPREHEN METABOLIC PANEL: CPT

## 2023-08-14 PROCEDURE — 85025 COMPLETE CBC W/AUTO DIFF WBC: CPT

## 2023-08-14 PROCEDURE — 85652 RBC SED RATE AUTOMATED: CPT

## 2023-08-14 PROCEDURE — 82164 ANGIOTENSIN I ENZYME TEST: CPT

## 2023-08-14 NOTE — PROGRESS NOTES
Patient is here for central labs. She offers no complaints at this time. Labs drawn per dr's orders.  Next appointment confirmed and avs given

## 2023-08-15 ENCOUNTER — ANESTHESIA EVENT (OUTPATIENT)
Dept: PERIOP | Facility: HOSPITAL | Age: 58
DRG: 230 | End: 2023-08-15
Payer: COMMERCIAL

## 2023-08-15 ENCOUNTER — TELEPHONE (OUTPATIENT)
Dept: NEPHROLOGY | Facility: CLINIC | Age: 58
End: 2023-08-15

## 2023-08-15 LAB — ACE SERPL-CCNC: 32 U/L (ref 14–82)

## 2023-08-15 NOTE — TELEPHONE ENCOUNTER
Called pt to reschedule the consult that was cancelled on 8/9/23. Spoke to Gus and [facility] Rich [facility]ae[facility].

## 2023-08-17 ENCOUNTER — HOSPITAL ENCOUNTER (INPATIENT)
Facility: HOSPITAL | Age: 58
LOS: 4 days | Discharge: HOME HEALTH | DRG: 230 | End: 2023-08-21
Attending: COLON & RECTAL SURGERY | Admitting: COLON & RECTAL SURGERY
Payer: COMMERCIAL

## 2023-08-17 ENCOUNTER — ANESTHESIA (OUTPATIENT)
Dept: PERIOP | Facility: HOSPITAL | Age: 58
DRG: 230 | End: 2023-08-17
Payer: COMMERCIAL

## 2023-08-17 ENCOUNTER — DOCUMENTATION (OUTPATIENT)
Dept: HEMATOLOGY ONCOLOGY | Facility: CLINIC | Age: 58
End: 2023-08-17

## 2023-08-17 DIAGNOSIS — C20 RECTAL CANCER (HCC): Primary | ICD-10-CM

## 2023-08-17 DIAGNOSIS — G89.3 CANCER RELATED PAIN: ICD-10-CM

## 2023-08-17 DIAGNOSIS — C18.7 MALIGNANT NEOPLASM OF SIGMOID COLON (HCC): ICD-10-CM

## 2023-08-17 DIAGNOSIS — Z51.5 PALLIATIVE CARE PATIENT: ICD-10-CM

## 2023-08-17 LAB
ABO GROUP BLD: NORMAL
BLD GP AB SCN SERPL QL: NEGATIVE
GLUCOSE SERPL-MCNC: 185 MG/DL (ref 65–140)
GLUCOSE SERPL-MCNC: 192 MG/DL (ref 65–140)
GLUCOSE SERPL-MCNC: 198 MG/DL (ref 65–140)
GLUCOSE SERPL-MCNC: 247 MG/DL (ref 65–140)
PLATELET # BLD AUTO: 382 THOUSANDS/UL (ref 149–390)
PMV BLD AUTO: 9 FL (ref 8.9–12.7)
RH BLD: POSITIVE
SPECIMEN EXPIRATION DATE: NORMAL

## 2023-08-17 PROCEDURE — 44188 LAP COLOSTOMY: CPT | Performed by: COLON & RECTAL SURGERY

## 2023-08-17 PROCEDURE — 0D1B4Z4 BYPASS ILEUM TO CUTANEOUS, PERCUTANEOUS ENDOSCOPIC APPROACH: ICD-10-PCS | Performed by: COLON & RECTAL SURGERY

## 2023-08-17 PROCEDURE — 86900 BLOOD TYPING SEROLOGIC ABO: CPT | Performed by: ANESTHESIOLOGY

## 2023-08-17 PROCEDURE — 88342 IMHCHEM/IMCYTCHM 1ST ANTB: CPT | Performed by: PATHOLOGY

## 2023-08-17 PROCEDURE — 86901 BLOOD TYPING SEROLOGIC RH(D): CPT | Performed by: ANESTHESIOLOGY

## 2023-08-17 PROCEDURE — 0DTG4ZZ RESECTION OF LEFT LARGE INTESTINE, PERCUTANEOUS ENDOSCOPIC APPROACH: ICD-10-PCS | Performed by: COLON & RECTAL SURGERY

## 2023-08-17 PROCEDURE — 8E0W4CZ ROBOTIC ASSISTED PROCEDURE OF TRUNK REGION, PERCUTANEOUS ENDOSCOPIC APPROACH: ICD-10-PCS | Performed by: COLON & RECTAL SURGERY

## 2023-08-17 PROCEDURE — 88341 IMHCHEM/IMCYTCHM EA ADD ANTB: CPT | Performed by: PATHOLOGY

## 2023-08-17 PROCEDURE — 0DJD8ZZ INSPECTION OF LOWER INTESTINAL TRACT, VIA NATURAL OR ARTIFICIAL OPENING ENDOSCOPIC: ICD-10-PCS | Performed by: COLON & RECTAL SURGERY

## 2023-08-17 PROCEDURE — 88309 TISSUE EXAM BY PATHOLOGIST: CPT | Performed by: PATHOLOGY

## 2023-08-17 PROCEDURE — NC001 PR NO CHARGE: Performed by: COLON & RECTAL SURGERY

## 2023-08-17 PROCEDURE — 85049 AUTOMATED PLATELET COUNT: CPT | Performed by: SURGERY

## 2023-08-17 PROCEDURE — 44213 LAP MOBIL SPLENIC FL ADD-ON: CPT | Performed by: COLON & RECTAL SURGERY

## 2023-08-17 PROCEDURE — 88304 TISSUE EXAM BY PATHOLOGIST: CPT | Performed by: PATHOLOGY

## 2023-08-17 PROCEDURE — 82948 REAGENT STRIP/BLOOD GLUCOSE: CPT

## 2023-08-17 PROCEDURE — 99223 1ST HOSP IP/OBS HIGH 75: CPT | Performed by: INTERNAL MEDICINE

## 2023-08-17 PROCEDURE — 44207 L COLECTOMY/COLOPROCTOSTOMY: CPT | Performed by: COLON & RECTAL SURGERY

## 2023-08-17 PROCEDURE — 86850 RBC ANTIBODY SCREEN: CPT | Performed by: ANESTHESIOLOGY

## 2023-08-17 PROCEDURE — S2900 ROBOTIC SURGICAL SYSTEM: HCPCS | Performed by: COLON & RECTAL SURGERY

## 2023-08-17 PROCEDURE — NC001 PR NO CHARGE: Performed by: PHYSICIAN ASSISTANT

## 2023-08-17 PROCEDURE — 94760 N-INVAS EAR/PLS OXIMETRY 1: CPT

## 2023-08-17 RX ORDER — FENTANYL CITRATE/PF 50 MCG/ML
25 SYRINGE (ML) INJECTION
Status: COMPLETED | OUTPATIENT
Start: 2023-08-17 | End: 2023-08-17

## 2023-08-17 RX ORDER — METRONIDAZOLE 500 MG/100ML
500 INJECTION, SOLUTION INTRAVENOUS ONCE
Status: COMPLETED | OUTPATIENT
Start: 2023-08-17 | End: 2023-08-17

## 2023-08-17 RX ORDER — HYDROMORPHONE HCL IN WATER/PF 6 MG/30 ML
0.2 PATIENT CONTROLLED ANALGESIA SYRINGE INTRAVENOUS
Status: DISCONTINUED | OUTPATIENT
Start: 2023-08-17 | End: 2023-08-17

## 2023-08-17 RX ORDER — ROCURONIUM BROMIDE 10 MG/ML
INJECTION, SOLUTION INTRAVENOUS AS NEEDED
Status: DISCONTINUED | OUTPATIENT
Start: 2023-08-17 | End: 2023-08-17

## 2023-08-17 RX ORDER — PREDNISONE 20 MG/1
20 TABLET ORAL DAILY
Status: DISCONTINUED | OUTPATIENT
Start: 2023-08-18 | End: 2023-08-21 | Stop reason: HOSPADM

## 2023-08-17 RX ORDER — LIDOCAINE HYDROCHLORIDE 10 MG/ML
INJECTION, SOLUTION EPIDURAL; INFILTRATION; INTRACAUDAL; PERINEURAL AS NEEDED
Status: DISCONTINUED | OUTPATIENT
Start: 2023-08-17 | End: 2023-08-17

## 2023-08-17 RX ORDER — SODIUM CHLORIDE, SODIUM GLUCONATE, SODIUM ACETATE, POTASSIUM CHLORIDE, MAGNESIUM CHLORIDE, SODIUM PHOSPHATE, DIBASIC, AND POTASSIUM PHOSPHATE .53; .5; .37; .037; .03; .012; .00082 G/100ML; G/100ML; G/100ML; G/100ML; G/100ML; G/100ML; G/100ML
125 INJECTION, SOLUTION INTRAVENOUS CONTINUOUS
Status: DISCONTINUED | OUTPATIENT
Start: 2023-08-17 | End: 2023-08-17

## 2023-08-17 RX ORDER — URSODIOL 300 MG/1
900 CAPSULE ORAL DAILY
Status: DISCONTINUED | OUTPATIENT
Start: 2023-08-17 | End: 2023-08-21 | Stop reason: HOSPADM

## 2023-08-17 RX ORDER — SODIUM CHLORIDE 9 MG/ML
INJECTION, SOLUTION INTRAVENOUS CONTINUOUS PRN
Status: DISCONTINUED | OUTPATIENT
Start: 2023-08-17 | End: 2023-08-17

## 2023-08-17 RX ORDER — AMLODIPINE BESYLATE 10 MG/1
20 TABLET ORAL DAILY
Status: DISCONTINUED | OUTPATIENT
Start: 2023-08-18 | End: 2023-08-21 | Stop reason: HOSPADM

## 2023-08-17 RX ORDER — GABAPENTIN 100 MG/1
100 CAPSULE ORAL 3 TIMES DAILY
Status: DISCONTINUED | OUTPATIENT
Start: 2023-08-17 | End: 2023-08-18

## 2023-08-17 RX ORDER — POLYETHYLENE GLYCOL 3350 17 G/17G
255 POWDER, FOR SOLUTION ORAL ONCE
Status: DISCONTINUED | OUTPATIENT
Start: 2023-08-17 | End: 2023-08-17

## 2023-08-17 RX ORDER — HEPARIN SODIUM 5000 [USP'U]/ML
5000 INJECTION, SOLUTION INTRAVENOUS; SUBCUTANEOUS ONCE
Status: COMPLETED | OUTPATIENT
Start: 2023-08-17 | End: 2023-08-17

## 2023-08-17 RX ORDER — PROPOFOL 10 MG/ML
INJECTION, EMULSION INTRAVENOUS AS NEEDED
Status: DISCONTINUED | OUTPATIENT
Start: 2023-08-17 | End: 2023-08-17

## 2023-08-17 RX ORDER — MIDAZOLAM HYDROCHLORIDE 2 MG/2ML
INJECTION, SOLUTION INTRAMUSCULAR; INTRAVENOUS AS NEEDED
Status: DISCONTINUED | OUTPATIENT
Start: 2023-08-17 | End: 2023-08-17

## 2023-08-17 RX ORDER — INDOCYANINE GREEN AND WATER 25 MG
KIT INJECTION AS NEEDED
Status: DISCONTINUED | OUTPATIENT
Start: 2023-08-17 | End: 2023-08-17

## 2023-08-17 RX ORDER — MAGNESIUM HYDROXIDE 1200 MG/15ML
LIQUID ORAL AS NEEDED
Status: DISCONTINUED | OUTPATIENT
Start: 2023-08-17 | End: 2023-08-17 | Stop reason: HOSPADM

## 2023-08-17 RX ORDER — SODIUM CHLORIDE, SODIUM LACTATE, POTASSIUM CHLORIDE, CALCIUM CHLORIDE 600; 310; 30; 20 MG/100ML; MG/100ML; MG/100ML; MG/100ML
125 INJECTION, SOLUTION INTRAVENOUS CONTINUOUS
Status: DISCONTINUED | OUTPATIENT
Start: 2023-08-17 | End: 2023-08-17

## 2023-08-17 RX ORDER — BISACODYL 5 MG/1
10 TABLET, DELAYED RELEASE ORAL 2 TIMES DAILY
Status: DISCONTINUED | OUTPATIENT
Start: 2023-08-17 | End: 2023-08-17

## 2023-08-17 RX ORDER — OXYBUTYNIN CHLORIDE 5 MG/1
5 TABLET ORAL EVERY 6 HOURS PRN
Status: DISCONTINUED | OUTPATIENT
Start: 2023-08-17 | End: 2023-08-21 | Stop reason: HOSPADM

## 2023-08-17 RX ORDER — ACETAMINOPHEN 325 MG/1
975 TABLET ORAL EVERY 8 HOURS SCHEDULED
Status: DISCONTINUED | OUTPATIENT
Start: 2023-08-17 | End: 2023-08-21 | Stop reason: HOSPADM

## 2023-08-17 RX ORDER — FENTANYL CITRATE 50 UG/ML
INJECTION, SOLUTION INTRAMUSCULAR; INTRAVENOUS AS NEEDED
Status: DISCONTINUED | OUTPATIENT
Start: 2023-08-17 | End: 2023-08-17

## 2023-08-17 RX ORDER — PHENYLEPHRINE HCL IN 0.9% NACL 1 MG/10 ML
SYRINGE (ML) INTRAVENOUS AS NEEDED
Status: DISCONTINUED | OUTPATIENT
Start: 2023-08-17 | End: 2023-08-17

## 2023-08-17 RX ORDER — HEPARIN SODIUM 5000 [USP'U]/ML
5000 INJECTION, SOLUTION INTRAVENOUS; SUBCUTANEOUS EVERY 8 HOURS SCHEDULED
Status: DISCONTINUED | OUTPATIENT
Start: 2023-08-17 | End: 2023-08-21

## 2023-08-17 RX ORDER — HYDROMORPHONE HCL/PF 1 MG/ML
0.5 SYRINGE (ML) INJECTION
Status: DISCONTINUED | OUTPATIENT
Start: 2023-08-17 | End: 2023-08-17 | Stop reason: HOSPADM

## 2023-08-17 RX ORDER — SODIUM CHLORIDE, SODIUM LACTATE, POTASSIUM CHLORIDE, CALCIUM CHLORIDE 600; 310; 30; 20 MG/100ML; MG/100ML; MG/100ML; MG/100ML
125 INJECTION, SOLUTION INTRAVENOUS CONTINUOUS
Status: DISCONTINUED | OUTPATIENT
Start: 2023-08-17 | End: 2023-08-18

## 2023-08-17 RX ORDER — METHOCARBAMOL 500 MG/1
500 TABLET, FILM COATED ORAL EVERY 8 HOURS SCHEDULED
Status: DISCONTINUED | OUTPATIENT
Start: 2023-08-17 | End: 2023-08-21 | Stop reason: HOSPADM

## 2023-08-17 RX ORDER — HYDROMORPHONE HCL/PF 1 MG/ML
SYRINGE (ML) INJECTION AS NEEDED
Status: DISCONTINUED | OUTPATIENT
Start: 2023-08-17 | End: 2023-08-17

## 2023-08-17 RX ORDER — SODIUM CHLORIDE, SODIUM LACTATE, POTASSIUM CHLORIDE, CALCIUM CHLORIDE 600; 310; 30; 20 MG/100ML; MG/100ML; MG/100ML; MG/100ML
INJECTION, SOLUTION INTRAVENOUS CONTINUOUS PRN
Status: DISCONTINUED | OUTPATIENT
Start: 2023-08-17 | End: 2023-08-17

## 2023-08-17 RX ORDER — ONDANSETRON 2 MG/ML
4 INJECTION INTRAMUSCULAR; INTRAVENOUS ONCE AS NEEDED
Status: DISCONTINUED | OUTPATIENT
Start: 2023-08-17 | End: 2023-08-17 | Stop reason: HOSPADM

## 2023-08-17 RX ORDER — ONDANSETRON 2 MG/ML
4 INJECTION INTRAMUSCULAR; INTRAVENOUS EVERY 6 HOURS PRN
Status: DISCONTINUED | OUTPATIENT
Start: 2023-08-17 | End: 2023-08-21 | Stop reason: HOSPADM

## 2023-08-17 RX ORDER — KETAMINE HCL IN NACL, ISO-OSM 100MG/10ML
SYRINGE (ML) INJECTION AS NEEDED
Status: DISCONTINUED | OUTPATIENT
Start: 2023-08-17 | End: 2023-08-17

## 2023-08-17 RX ORDER — CEFAZOLIN SODIUM 2 G/50ML
2000 SOLUTION INTRAVENOUS ONCE
Status: COMPLETED | OUTPATIENT
Start: 2023-08-17 | End: 2023-08-17

## 2023-08-17 RX ADMIN — Medication 20 MG: at 08:09

## 2023-08-17 RX ADMIN — CEFAZOLIN SODIUM 2000 MG: 2 SOLUTION INTRAVENOUS at 07:47

## 2023-08-17 RX ADMIN — FENTANYL CITRATE 25 MCG: 50 INJECTION INTRAMUSCULAR; INTRAVENOUS at 12:30

## 2023-08-17 RX ADMIN — HYDROMORPHONE HYDROCHLORIDE: 10 INJECTION, SOLUTION INTRAMUSCULAR; INTRAVENOUS; SUBCUTANEOUS at 12:30

## 2023-08-17 RX ADMIN — ROCURONIUM BROMIDE 20 MG: 10 INJECTION, SOLUTION INTRAVENOUS at 08:07

## 2023-08-17 RX ADMIN — FENTANYL CITRATE 25 MCG: 50 INJECTION INTRAMUSCULAR; INTRAVENOUS at 12:14

## 2023-08-17 RX ADMIN — Medication 30 MG: at 07:35

## 2023-08-17 RX ADMIN — ROCURONIUM BROMIDE 10 MG: 10 INJECTION, SOLUTION INTRAVENOUS at 10:16

## 2023-08-17 RX ADMIN — LIDOCAINE HYDROCHLORIDE 40 MG: 10 INJECTION, SOLUTION EPIDURAL; INFILTRATION; INTRACAUDAL; PERINEURAL at 07:35

## 2023-08-17 RX ADMIN — HYDROMORPHONE HYDROCHLORIDE 0.5 MG: 1 INJECTION, SOLUTION INTRAMUSCULAR; INTRAVENOUS; SUBCUTANEOUS at 08:18

## 2023-08-17 RX ADMIN — FENTANYL CITRATE 50 MCG: 50 INJECTION, SOLUTION INTRAMUSCULAR; INTRAVENOUS at 11:25

## 2023-08-17 RX ADMIN — HYDROMORPHONE HYDROCHLORIDE 0.5 MG: 1 INJECTION, SOLUTION INTRAMUSCULAR; INTRAVENOUS; SUBCUTANEOUS at 11:56

## 2023-08-17 RX ADMIN — HYDROMORPHONE HYDROCHLORIDE 0.5 MG: 1 INJECTION, SOLUTION INTRAMUSCULAR; INTRAVENOUS; SUBCUTANEOUS at 13:04

## 2023-08-17 RX ADMIN — HEPARIN SODIUM 5000 UNITS: 5000 INJECTION INTRAVENOUS; SUBCUTANEOUS at 22:08

## 2023-08-17 RX ADMIN — SODIUM CHLORIDE, SODIUM LACTATE, POTASSIUM CHLORIDE, AND CALCIUM CHLORIDE: .6; .31; .03; .02 INJECTION, SOLUTION INTRAVENOUS at 11:43

## 2023-08-17 RX ADMIN — HYDROCORTISONE SODIUM SUCCINATE 50 MG: 100 INJECTION, POWDER, FOR SOLUTION INTRAMUSCULAR; INTRAVENOUS at 22:06

## 2023-08-17 RX ADMIN — INDOCYANINE GREEN AND WATER 2.5 MG: KIT at 10:34

## 2023-08-17 RX ADMIN — FENTANYL CITRATE 25 MCG: 50 INJECTION INTRAMUSCULAR; INTRAVENOUS at 12:08

## 2023-08-17 RX ADMIN — SODIUM CHLORIDE, SODIUM LACTATE, POTASSIUM CHLORIDE, AND CALCIUM CHLORIDE 125 ML/HR: .6; .31; .03; .02 INJECTION, SOLUTION INTRAVENOUS at 20:17

## 2023-08-17 RX ADMIN — CEFAZOLIN SODIUM 2000 MG: 2 SOLUTION INTRAVENOUS at 11:23

## 2023-08-17 RX ADMIN — PHENYLEPHRINE HYDROCHLORIDE 20 MCG/MIN: 10 INJECTION INTRAVENOUS at 07:57

## 2023-08-17 RX ADMIN — MIDAZOLAM 2 MG: 1 INJECTION INTRAMUSCULAR; INTRAVENOUS at 07:30

## 2023-08-17 RX ADMIN — SODIUM CHLORIDE, SODIUM LACTATE, POTASSIUM CHLORIDE, AND CALCIUM CHLORIDE 125 ML/HR: .6; .31; .03; .02 INJECTION, SOLUTION INTRAVENOUS at 13:21

## 2023-08-17 RX ADMIN — HEPARIN SODIUM 5000 UNITS: 5000 INJECTION INTRAVENOUS; SUBCUTANEOUS at 14:40

## 2023-08-17 RX ADMIN — SODIUM CHLORIDE, SODIUM LACTATE, POTASSIUM CHLORIDE, AND CALCIUM CHLORIDE: .6; .31; .03; .02 INJECTION, SOLUTION INTRAVENOUS at 07:30

## 2023-08-17 RX ADMIN — HYDROCORTISONE SODIUM SUCCINATE 100 MG: 100 INJECTION, POWDER, FOR SOLUTION INTRAMUSCULAR; INTRAVENOUS at 07:40

## 2023-08-17 RX ADMIN — SUGAMMADEX 200 MG: 100 INJECTION, SOLUTION INTRAVENOUS at 11:45

## 2023-08-17 RX ADMIN — INSULIN HUMAN 5 UNITS: 100 INJECTION, SOLUTION PARENTERAL at 12:31

## 2023-08-17 RX ADMIN — HEPARIN SODIUM 5000 UNITS: 5000 INJECTION INTRAVENOUS; SUBCUTANEOUS at 06:46

## 2023-08-17 RX ADMIN — METHOCARBAMOL 500 MG: 500 TABLET ORAL at 22:08

## 2023-08-17 RX ADMIN — SODIUM CHLORIDE: 0.9 INJECTION, SOLUTION INTRAVENOUS at 07:29

## 2023-08-17 RX ADMIN — HYDROMORPHONE HYDROCHLORIDE 0.2 MG: 0.2 INJECTION, SOLUTION INTRAMUSCULAR; INTRAVENOUS; SUBCUTANEOUS at 12:52

## 2023-08-17 RX ADMIN — ROCURONIUM BROMIDE 50 MG: 10 INJECTION, SOLUTION INTRAVENOUS at 07:35

## 2023-08-17 RX ADMIN — FENTANYL CITRATE 25 MCG: 50 INJECTION INTRAMUSCULAR; INTRAVENOUS at 12:21

## 2023-08-17 RX ADMIN — MORPHINE SULFATE 2 MG: 2 INJECTION, SOLUTION INTRAMUSCULAR; INTRAVENOUS at 15:38

## 2023-08-17 RX ADMIN — PROPOFOL 200 MG: 10 INJECTION, EMULSION INTRAVENOUS at 07:35

## 2023-08-17 RX ADMIN — ACETAMINOPHEN 975 MG: 325 TABLET, FILM COATED ORAL at 14:41

## 2023-08-17 RX ADMIN — SODIUM CHLORIDE, SODIUM LACTATE, POTASSIUM CHLORIDE, AND CALCIUM CHLORIDE 125 ML/HR: .6; .31; .03; .02 INJECTION, SOLUTION INTRAVENOUS at 06:40

## 2023-08-17 RX ADMIN — METRONIDAZOLE: 500 SOLUTION INTRAVENOUS at 07:42

## 2023-08-17 RX ADMIN — MORPHINE SULFATE 2 MG: 2 INJECTION, SOLUTION INTRAMUSCULAR; INTRAVENOUS at 20:19

## 2023-08-17 RX ADMIN — INDOCYANINE GREEN AND WATER 2.5 MG: KIT at 09:58

## 2023-08-17 RX ADMIN — ROCURONIUM BROMIDE 20 MG: 10 INJECTION, SOLUTION INTRAVENOUS at 09:13

## 2023-08-17 RX ADMIN — FENTANYL CITRATE 50 MCG: 50 INJECTION, SOLUTION INTRAMUSCULAR; INTRAVENOUS at 10:18

## 2023-08-17 RX ADMIN — Medication 100 MCG: at 10:46

## 2023-08-17 RX ADMIN — MORPHINE SULFATE 2 MG: 2 INJECTION, SOLUTION INTRAMUSCULAR; INTRAVENOUS at 17:53

## 2023-08-17 RX ADMIN — FENTANYL CITRATE 100 MCG: 50 INJECTION, SOLUTION INTRAMUSCULAR; INTRAVENOUS at 07:35

## 2023-08-17 RX ADMIN — ACETAMINOPHEN 975 MG: 325 TABLET, FILM COATED ORAL at 22:08

## 2023-08-17 RX ADMIN — METHOCARBAMOL 500 MG: 500 TABLET ORAL at 14:41

## 2023-08-17 RX ADMIN — ROCURONIUM BROMIDE 10 MG: 10 INJECTION, SOLUTION INTRAVENOUS at 11:22

## 2023-08-17 RX ADMIN — HYDROCORTISONE SODIUM SUCCINATE 50 MG: 100 INJECTION, POWDER, FOR SOLUTION INTRAMUSCULAR; INTRAVENOUS at 14:40

## 2023-08-17 RX ADMIN — GABAPENTIN 100 MG: 100 CAPSULE ORAL at 20:22

## 2023-08-17 NOTE — ANESTHESIA POSTPROCEDURE EVALUATION
Post-Op Assessment Note    CV Status:  Stable  Pain Score: 2    Pain management: adequate     Mental Status:  Arousable and sleepy   Hydration Status:  Euvolemic   PONV Controlled:  Controlled   Airway Patency:  Patent      Post Op Vitals Reviewed: Yes            No notable events documented.     /75 (08/17/23 1204)    Temp 98.9 °F (37.2 °C) (08/17/23 1204)    Pulse 80 (08/17/23 1204)   Resp 16 (08/17/23 1204)    SpO2 96 % (08/17/23 1204)

## 2023-08-17 NOTE — PROGRESS NOTES
Progress Note - Colorectal Surgery   Rocky Polanco 56 y.o. female MRN: [id number]  Unit/Bed#: [facility] 809-01 Encounter: [id number]    Assessment:  82-year-old female with mid rectal cancer status post neoadjuvant chemoradiation, now status post robotic low anterior resection, reversal of diverting loop colostomy and diverting loop ileostomy on 8/17    Tachycardia low 100s yesterday, now vitals normal on 2 L nasal cannula  Urine output 950cc  NEPTALI 400cc serosanguinous  Stoma no output recorded, scant bowel sweat in appliance    WBC pending  Hb pending  Cr pending    Plan:  Wean O2, IS/pulm toilet  Advance to clear liquids with toast and crackers  [email or web address]  D/C peña, monitor urine output  Maintain NEPTALI drain, monitor output  Appreciate palliative recommendations, multimodal analgesia with scheduled IV morphine, follow up additional recommendations  Amanuel Gary out POD2 8/19  Switch back to home prednisone, 20 mg p.o. OD  DVT prophylaxis  Out of bed/ambulate  PT/OT    Subjective/Objective     Subjective: No acute events overnight, tolerating clear liquids without nausea or vomiting, denies stoma output yet, pain controlled    Objective:     Blood pressure 133/70, pulse 92, temperature 98.1 °F (36.7 °C), resp. rate 18, height 5' 2" (1.575 m), weight 70.3 kg (155 lb), SpO2 95 %. ,Body mass index is 28.35 kg/m². Intake/Output Summary (Last 24 hours) at 8/18/2023 0555  Last data filed at 8/18/2023 7211  Gross per 24 hour   Intake 3722.92 ml   Output 1400 ml   Net 2322.92 ml       Invasive Devices     Central Venous Catheter Line  Duration           Port A Cath 03/21/23 Right Internal jugular 149 days          Peripheral Intravenous Line  Duration           Peripheral IV 08/17/23 Dorsal (posterior); Left Hand <1 day    Peripheral IV 08/17/23 Right Arm <1 day          Drain  Duration           Ureteral Internal Stent Left ureter 6 Fr. 27 days    Ureteral Internal Stent Right ureter 7 Fr.  27 days    Closed/Suction Drain Right RLQ Bulb 19 Fr. <1 day    Ileostomy Loop RLQ <1 day    Urethral Catheter Latex;Straight-tip 16 Fr. <1 day                Physical Exam:   Gen:    NAD  CV:      warm, well-perfused  Lungs: No respiratory distress  Abd:     soft, appropriately tender, nondistended, incisions clean dry and intact, NEPTALI serosanguineous, stoma pink and healthy with no gas or stool output yet, bowel sweat in appliance  Ext:      no CCE  Neuro: A&Ox3

## 2023-08-17 NOTE — OP NOTE
OPERATIVE REPORT  PATIENT NAME: Reece Nieto    :  1965  MRN: [id number]  Pt Location: [facility] OR ROOM 14    SURGERY DATE: 2023    Surgeon(s) and Role:     * Devin Hazel MD - Primary     * Leopoldo Pallas, PA-C - Assisting-she was required as no qualified resident for bedside position for robotic, resident assisted on teaching console, she was required for robot docking, traction/countertraction, assistance with anastomosis, suction, closure     * Jennifer Flowers MD - Assisting    Preop Diagnosis:  Rectal cancer ([address]) C20    Post-Op Diagnosis Codes:     * Rectal cancer ([address]) [place]    Procedure(s):  Diagnostic laparoscopy  Robotic assisted low anterior resection  Low pelvic anastomosis, EEA 29 colorectal anastomosis, stapled  Left-sided loop colostomy reversal, including the accompanying colonic resection  Diverting Loop ileostomy right lower quadrant    Specimen(s):  ID Type Source Tests Collected by Time Destination   1 : Low Anterior Resection. Additional proximal colon "Ostomy"- Tattoo marks distal  (DMT) Tissue Colon TISSUE EXAM Devin Hazel MD 2023 1020    2 : Final Distal Rectal Margin (DMT)  Tissue Colon TISSUE EXAM Devin Hazel MD 2023 1054      Estimated Blood Loss:   Minimal    Drains:  Closed/Suction Drain Right RLQ Bulb 19 Fr. (Active)   Number of days: 0       Ileostomy Loop RLQ (Active)   Number of days: 0       Urethral Catheter Latex;Straight-tip 16 Fr. (Active)   Site Assessment Patent 23 1153   Collection Container Standard drainage bag 23 1153   Securement Method Securing device (Describe) 23 1153   Number of days: 0       Ureteral Internal Stent Left ureter 6 Fr. (Active)   Number of days: 27       Ureteral Internal Stent Right ureter 7 Fr.  (Active)   Number of days: 27       REMOVED NG/OG/Enteral Tube Orogastric 18 Fr Center mouth (Removed)   Number of days: 0     Anesthesia Type:   General    Operative Indications:  Rectal cancer ([address]) C20    Operative Findings:  Operative Findings:  -Stapled colorectal anastomosis EEA29, negative bubble leak test, intact anastomotic rings, good fluorescence Firefly, ~7cm from anal verge  -Colostomy site extended for HandPort to effect final step, colorectal anastomosis, avoiding midline or additional incision    -Arm 1 LUQ 8mm port TipUp  -Arm 2 Left midepigastric 8mm port Fenestrated bipolar  -Arm 3-right periumbilical 8mm camera port-used for diverting loop ileostomy  -Arm4-RLQ off ASIS 12mm- Staple/scissor/Vessel sealer, used for 19 round drain  -Assist RUQ 5mm port    Complications:   None    Procedure and Technique:  Magdaleno Mcconnell returns today in surgical discussion after neoadjuvant chemoradiation for middle rectal cancer, she completed this 5/25/23.     We discussed Robotic, possible laparoscopic versus open low anterior resection, this will include colostomy reversal, she understands likely need for diverting loop ileostomy postradiation. We did discuss a one-step procedure keeping colostomy however she wanted attempts at reconstruction. We did discuss with her today that she is at multiple high risks giving her sarcoid, and liver disease, in a face-to-face, personal, informed consent process, the benefits, alternatives,   risks including not limited to bleeding, infection, risks of anesthesia, open surgery, DVT/PE, heart attack, stroke, death, damage to local structures(e.g. ureter, duodenum),anastomotic leak requiring reoperation, temporary versus permanent stoma. They understood these risks, signed informed consent, and wish to proceed. She was brought to the operating room where general endotracheal anesthesia was induced without event,was placed in modified lithotomy position with pink pad positioner, attention to joint/bony extremities. Venodyne were on and running throughout the procedure.   She was given cefazolin/Flagyl prior to skin incision, heparin subcutaneous was given preoperatively, as well as stress dose steroid. She was ChloraPrep abdomen and sterile draped/IO ban after appropriate drying time. After time-out was taken per protocol procedure began with Optiview/gasless placement of a right upper quadrant 5 mm trocar, until peritoneal cavity was visualized and entered without event, 15 mm CO2 pneumoperitoneum established, diagnostic laparoscopy showed pelvic tattoo, no other obvious peritoneal or hepatic lesion, omental adhesions to the right side. Four robotic ports were then placed as listed in the findings,remote centers were placed at appropriate level, using thermal scissors the omental adhesions were taken down from the abdominal wall, clearing the area completely for the robotic work, then camera was placed and aimed at the left lower quadrant after bringing the robot in and docking over the right hip. Targeting was accomplished with appropriate hand support at the ports which had all tension released after instrument placement and targeting was completed. Once docking was completed I moved to the console where the instruments were oriented in consistent view in the field to avoid injury.     Began with lifting the sigmoid colon to expose the pelvic/presacral lines, scissor was used to enter the presacral space and the areolar plane, taking the total mesorectal excision in a tailored fashion down below the level of the tattoo posteriorly, meeting laterally while avoiding the stented ureters and then developing the rectovaginal septum anteriorly, sure form 60 green load was taken in 2 fires to resect the distal margin, then sealer was used to skeletonize the left colic artery and superior rectal artery at their origin, these were taken in a high/central ligation between triple burns of the sealer,continued mediolateral dissection up towards the splenic flexure, skeletonizing the distal loop of the colostomy, taking a continued wide V of mesentery from the mesorectal excision up to this end, for the specimen and taking this off of the abdominal wall with an additional fire of the stapler. Remainder of the left-sided colon that was extending to the ostomy was taken off of its retroperitoneal attachments for length and then prepared for anastomosis after ICG was injected and firefly showed excellent blood supply to the end of colostomy. Instruments were then removed after hemostasis was assured, robot was undocked, mucocutaneous was taken down at the junction from the loop colostomy, she had a wide fascial defect and this only needed to be extended 2 cm on the skin and fascia to accommodate a HandPort, avoiding any additional extraction incision. Note that the wound protector was in for the duration of the procedure, the end of the colostomy was brought out. Reinsufflated and using brief hand assistance, oriented the colon into the pelvis where it easily reached, few additional mesenteric attachments taken down, the staple line was brought out, and resected with electrocautery passing the colostomy specimen off, after placing drapes, and repeat ICG fluorescence showed excellent blood supply to the tip, Prolene suture was placed in pursestring fashion, EEA 29 anvil was placed and secured with double tie. Reinsufflated and above  placed hand in and anvil reached down to pelvis without tension, at this point I went below as below , EEA dilators were placed to the rectal stump after Betadine irrigation to clean out the rectal stump,, then placed handle to the posterior of middle of the staple line and spike was opened until orange flash was showing, above  placed anvil down with audible click, and colon was shown with good orientation prior to appropriate compression time and then firing, was removed showing good anastomotic rings.     Pelvis was filled with irrigation, I placed flexible sigmoidoscope showing good staple line formation, with good bleeding points and no hemorrhage, negative bubble test under pelvis filled with irrigation. I went back above as above  after changing gown/gloves, above operators changed gloves, 19 round drain was threaded through 12 mm port and secured with nylon, looped into the pelvis, ileostomy was traced back from terminal ileum and marked with sutures to orient proximal and distal, approximately 30 cm proximal to TI. Kadi umbilical camera port was used for ileostomy, circular paddle removed at the port and incising through the subcutaneous tissues to the fascia, this was incised in a cruciate fashion, splitting the rectus sheath and posteriorly as well cruciate incision, Leocadia Leather was used to bring the oriented ileostomy loop out. Fascia was then closed after correct counts in the following fashion,  #1 PDS from either side of the fascia and multiple interrupted figure-of-eight,. 4-0 Monocryl on the skin extensions with Histoacryl for the incision and remaining port sites, the original colostomy opening was closed with 2-0 Monocryl in a pursestring fashion and single gauze wick was placed. Once the wounds were covered the ileostomy was then matured by incising the transverse incision, securing both loops in a Bree fashion with serosal bites to flip a nice bud, multiple in between sutures to secure the mucocutaneous seal and appliance was placed. All sponge, needle, instrument and RF Wand counts were correct, I was present/scrubbed or at console for the entirety of the procedure, patient was extubated and brought to the recovery room in stable condition.     Patient Disposition:  PACU     Colon Resection  Operation performed with curative intent Yes   Tumor Location (select all that apply) Rectum, NOS   Extent of colon and vascular resection (select all that apply) Sigmoid resection - inferior mesenteric    Low anterior resection, tailored proctectomy with total mesorectal excision     SIGNATURE: López Funez MD  DATE: August 17, 2023  TIME: 12:13 PM

## 2023-08-17 NOTE — CONSULTS
Consultation - Palliative and Supportive Care   Nathalie Monzon 56 y.o. female [id number]    Patient Active Problem List   Diagnosis   • Malignant neoplasm of sigmoid colon [facility])   • History of Clostridium difficile colitis   • Other hemochromatosis   • Hypertension   • Sarcoidosis   • Impaired fasting glucose   • Hypokalemia   • Transaminitis   • R flank pain with bilateral hydronephrosis   • Adrenal nodule (HCC)   • Bleeding from colostomy stoma [facility])   • Other specified anemias   • Thrombocytopenia ([address])   • Nonrheumatic mitral valve regurgitation   • Advance directive discussed with patient   • Gross hematuria   • Skin lesion   • Abnormal CT of the chest   • Kidney calculi   • Dysuria   • Chest pain   • Abnormal TSH   • Port-A-Cath in place   • Drug-induced constipation   • Palliative care patient   • Cancer related pain   • Drug-induced diarrhea   • Rectal cancer (HCC)   • Preop examination   • Herpes zoster without complication     Active issues specifically addressed today include:  Rectal cancer status post neoadjuvant chemoradiation, postop day 0 for diagnostic laparoscopy with loop colostomy reversal and diverting loop ileostomy  Cancer associated pain  Recent herpes zoster  Sarcoidosis  Palliative care patient    Plan:  1. Symptom management -   -DC Dilaudid PCA   -Start IV morphine 2 mg every 4 hours around-the-clock with hold parameters   -IV morphine 2 mg every 6 hours as needed for breakthrough pain   -Continue adjuncts as is, including Tylenol, Robaxin, and gabapentin   -Will defer bowel regimen to colorectal surgery       2. Goals -full cares with no limits. Short-term goal is to achieve pain control with transition to outpatient pain regimen. 3. Psychosocial support -time spent providing supportive listening       4.  Alena Peterson [id number] will follow closely, please notify on-call provider if any uncontrolled pain       Code Status: Full- Level 1   Decisional apparatus:  Patient is competent on my exam today. If competence is lost, patient's substitute decision maker would default to N/A by [place] Act 169. Advance Directive / Living Will / POLST: No     I have reviewed the patient's controlled substance dispensing history in the Prescription Drug Monitoring Program in compliance with the [facility] regulations before prescribing any controlled substances. We appreciate the invitation to be involved in this patient's care. We will follow. Please do not hesitate to reach our on call provider through our clinic answering service at [phone] should you have acute symptom control concerns. Divya Graham DO  Palliative and Supportive Care  Clinic/Answering Service: [phone]  You can find me on [employer]! IDENTIFICATION:  Inpatient consult to Palliative Care  Consult performed by: Divya Graham DO  Consult ordered by: Chari Aguilera PA-C        Physician Requesting Consult: Marce Hlil MD  Reason for Consult / Principal Problem: Pain  Hx and PE limited by: N/A    HISTORY OF PRESENT ILLNESS:       Rema Shore is a 56 y.o. female who presented on August 17 for planned surgery. Patient has known rectosigmoid cancer status post diverting colostomy in December 2022 and now she is status post neoadjuvant chemoradiation and here today for surgery. She follows with palliative as an outpatient and is managed with low-dose oxycodone as needed and gets benefit from Flexeril as well. Recently was diagnosed with zoster and was started on gabapentin with relief. Today she underwent a diagnostic laparoscopy with loop colostomy reversal and diverting ileostomy creation. She was placed on a Dilaudid PCA pump postoperatively and was struggling to obtain pain control. Palliative care was consulted for further assistance. Patient tells me that her other postoperative courses were managed by morphine and she felt that this proved better relief.   We discussed utilizing scheduled and as needed doses and to remove her from the PCA and she is agreeable. We also discussed that she should move to enteral opioids as appropriate and likely will happen within the next 24 hours. She agrees to the changes in her pain medication and will continue to follow to provide symptom relief and support. Review of Systems   Gastrointestinal: Positive for bloating and abdominal pain. Negative for constipation, diarrhea, nausea and vomiting. Psychiatric/Behavioral: The patient is nervous/anxious. All other systems reviewed and are negative.       Past Medical History:   Diagnosis Date   • Cancer [facility])    • Colon cancer ([address])    • Fall    • Headache    • Hemochromatosis, unspecified    • History of transfusion     2022 - no adverse reaction   • Hypertension    • Kidney calculi    • Kidney stone    • Liver disease     hemangioma   • Muscle weakness    • Personal history of COVID-19 12/2022   • Sarcoidosis    • Seizures ([address])     2022     Past Surgical History:   Procedure Laterality Date   • ABSCESS DRAINAGE      left breast   • BREAST BIOPSY     • CHEST TUBE INSERTION     • COLON SURGERY      colostomy   • COLONOSCOPY     • FL GUIDED CENTRAL VENOUS ACCESS DEVICE INSERTION  03/21/2023   • FL RETROGRADE PYELOGRAM  01/23/2023   • FL RETROGRADE PYELOGRAM  04/03/2023   • FL RETROGRADE PYELOGRAM  7/21/2023   • IR BIOPSY LIVER MASS  05/09/2023   • LUNG BIOPSY     • NC CYSTO BLADDER W/URETERAL CATHETERIZATION Bilateral 07/21/2023    Procedure: CYSTOSCOPY URETEROSCOPY RETROGRADE PYELOGRAM WITH BILATERAL STENT URETERAL EXCHANGE; LEFT LASER LITHOTRIPSY AND STONE BASKET RETRIEVAL;  Surgeon: René Shah MD;  [facility];  Service: Urology   • NC CYSTO/URETERO W/LITHOTRIPSY &INDWELL STENT INSRT Bilateral 01/23/2023    Procedure: CYSTOSCOPY  RIGHT URETEROSCOPY WITH LITHOTRIPSY HOLMIUM LASER, BILATERAL RETROGRADE PYELOGRAM AND BILATERAL  URETERAL STENT INSERTION;  Surgeon: Maikel Alonso Melissa Jay MD;  [facility];  Service: Urology   • UT CYSTO/URETERO W/LITHOTRIPSY &INDWELL STENT INSRT Right 04/03/2023    Procedure: RIGHT URETEROSCOPY WITH LITHOTRIPSY HOLMIUM LASER, RETROGRADE PYELOGRAM; BILATERAL EXCHANGE STENT URETERAL;  Surgeon: Blaze Sauer MD;  [facility];  Service: Urology   • UT INSJ TUNNELED CTR VAD W/SUBQ PORT AGE 5 YR/> N/A 03/21/2023    Procedure: INSERTION VENOUS PORT ( PORT-A-CATH) IR;  Surgeon: Angeli Carter DO;  [facility];  Service: Interventional Radiology   • TONSILLECTOMY     • URINARY SURGERY Bilateral     bilateral stents     Social History     Socioeconomic History   • Marital status:      Spouse name: Not on file   • Number of children: Not on file   • Years of education: Not on file   • Highest education level: Not on file   Occupational History   • Not on file   Tobacco Use   • Smoking status: Never     Passive exposure: Past   • Smokeless tobacco: Never   Vaping Use   • Vaping Use: Never used   Substance and Sexual Activity   • Alcohol use: Never   • Drug use: Never   • Sexual activity: Not Currently   Other Topics Concern   • Not on file   Social History Narrative    From 4/14/23  note:    Emergency Contact: Shauna Eckert (ex-) [phone] (Mobile)    Marital Status:     Caregiver/Support: ex- -Savana Yun and two dtrs. Children: two dtrs- nataliia 21 in [facility] and Supai [address] [facility]    Child/Elder care: no     Housing: two story house    Home Setup: one level    Lives With:  frankie and two dtrs    Daily Living Activities: independent    Durable Medical Equipment: No    Ambulation: independent    Employment: disabled-SSI-$100 and pension-$700    [place] Status/Location: no     Ability to pay bills: yes. No barriers to paying monthly bills. POA/LW/AD: no.  - Savana Yun is healthcare representative. MSW emailed advance directive.           Social Determinants of Health Financial Resource Strain: Not on file   Food Insecurity: No Food Insecurity (2/13/2023)    Hunger Vital Sign    • Worried About Running Out of Food in the Last Year: Never true    • Ran Out of Food in the Last Year: Never true   Transportation Needs: No Transportation Needs (2/13/2023)    PRAPARE - Transportation    • Lack of Transportation (Medical): No    • Lack of Transportation (Non-Medical):  No   Physical Activity: Not on file   Stress: Not on file   Social Connections: Not on file   Intimate Partner Violence: Not on file   Housing Stability: Low Risk  (2/13/2023)    Housing Stability Vital Sign    • Unable to Pay for Housing in the Last Year: No    • Number of Places Lived in the Last Year: 1    • Unstable Housing in the Last Year: No     Family History   Problem Relation Age of Onset   • Cancer Mother    • Cirrhosis Father    • Breast cancer Neg Hx    • Breast cancer additional onset Neg Hx        MEDICATIONS / ALLERGIES:    all current active meds have been reviewed and current meds:   Current Facility-Administered Medications   Medication Dose Route Frequency   • acetaminophen (TYLENOL) tablet 975 mg  975 mg Oral Q8H [address]   • [START ON 8/18/2023] amLODIPine (NORVASC) tablet 20 mg  20 mg Oral Daily   • gabapentin (NEURONTIN) capsule 100 mg  100 mg Oral TID   • heparin (porcine) subcutaneous injection 5,000 Units  5,000 Units Subcutaneous Q8H [address]   • hydrocortisone (Solu-CORTEF) injection 50 mg  50 mg Intravenous Q8H [address]   • lactated ringers bolus 1,000 mL  1,000 mL Intravenous Once PRN    And   • lactated ringers bolus 1,000 mL  1,000 mL Intravenous Once PRN   • lactated ringers infusion  125 mL/hr Intravenous Continuous   • methocarbamol (ROBAXIN) tablet 500 mg  500 mg Oral Q8H [address]   • morphine injection 2 mg  2 mg Intravenous Q4H   • morphine injection 2 mg  2 mg Intravenous Q6H PRN   • ondansetron (ZOFRAN) injection 4 mg  4 mg Intravenous Q6H PRN   • oxybutynin (DITROPAN) tablet 5 mg  5 mg Oral Q6H PRN   • [START ON 8/18/2023] predniSONE tablet 20 mg  20 mg Oral Daily   • sodium chloride 0.9 % bolus 1,000 mL  1,000 mL Intravenous Once PRN    And   • sodium chloride 0.9 % bolus 1,000 mL  1,000 mL Intravenous Once PRN   • ursodiol (ACTIGALL) capsule 900 mg  900 mg Oral Daily       Allergies   Allergen Reactions   • Oxaliplatin Shortness Of Breath     Reactions occurred with 2nd and 3rd infusions (about 1-3 hours from initiation of infusion) and required treatment with steroids and antihistamines. Please refer to allergy note on 2/7/2023 for detailed description of her reactions. OBJECTIVE:    Physical Exam  Physical Exam  Vitals and nursing note reviewed. Constitutional:       General: She is not in acute distress. Appearance: She is ill-appearing. HENT:      Head: Normocephalic and atraumatic. Right Ear: External ear normal.      Left Ear: External ear normal.   Eyes:      General:         Right eye: No discharge. Left eye: No discharge. Cardiovascular:      Rate and Rhythm: Normal rate and regular rhythm. Pulmonary:      Effort: Pulmonary effort is normal. No respiratory distress. Abdominal:      General: There is no distension. Palpations: Abdomen is soft. Tenderness: There is abdominal tenderness. Comments: Ostomy pink, incisions c/d/i   Skin:     Coloration: Skin is pale. Neurological:      General: No focal deficit present. Psychiatric:         Mood and Affect: Mood normal.         Behavior: Behavior normal.         Lab Results:   I have personally reviewed pertinent labs. , CBC:   Lab Results   Component Value Date     08/17/2023    MPV 9.0 08/17/2023   , CMP: No results found for: "SODIUM", "K", "CL", "CO2", "ANIONGAP", "BUN", "CREATININE", "GLUCOSE", "CALCIUM", "AST", "ALT", "ALKPHOS", "PROT", "BILITOT", "EGFR", PT/PTT:No results found for: "PT", "PTT"  Imaging Studies: Reviewed imaging  EKG, Pathology, and Other Studies: Reviewed    Counseling / Coordination of Care    Total floor / unit time spent today 75+ minutes. Greater than 50% of total time was spent with the patient and / or family counseling and / or coordination of care. A description of the counseling / coordination of care: Chart review, medication review, medication adjustments, review of outpatient records and discussion with primary palliative, discussion with primary team, discussion with nursing. .    Portions of this document may have been created using dictation software and as such some "sound alike" terms may have been generated by the system. Do not hesitate to contact me with any questions or clarifications.

## 2023-08-17 NOTE — PROGRESS NOTES
Post-Op Check -colorectal surgery   Henry Sun 56 y.o. female MRN: [id number]  Unit/Bed#: [facility] 809-01 Encounter: [id number]    Assessment:  46-year-old female with a past medical history of HTN, sarcoidosis(on prednisone taper), and rectal cancer status post neoadjuvant chemoradiation postop day 0 status post robotic LAR, loop colostomy reversal, diverting loop ileostomy placement by Dr. Alycia Riojas. UOP: 200cc dark fran urine  Ileostomy: scant serosanguinous output  NEPTALI drain 25 cc sanguinous output    Plan:  -Clear liquid diet  -Flory@W4  -Kaur in place  -NEPTALI drain to bulb suction  -Strict I/Os  -Sheron in old stoma site, do not remove, SURGERY TEAM TO REMOVE ON POD2, only change top dressing with saturation  -SQH for DVT prophylaxis  -Solu-Cortef 50 mg q8hr today, restart home prednisone dose tomorrow 8/18  -PRN antiemetics  -Currently on PCA-D with multi-modal PO pain control regimen, however patient states none of these modalities are working  -Will consult palliative care as the patient follows with them as an outpatient for her pain control  -Encourage incentive spirometer use  -Encourage out of bed and ambulation starting POD1    Subjective/Objective   Chief Complaint: Patient appears very upset that "we cannot control her pain at this hospital," however falls asleep while voicing this. She denies any fevers, chills, chest pain, shortness of breath, nausea or vomiting. She offers no other complaints other than pain. Subjective:   Pain: 10/10 on PCA-D and multi-modal PO regimen  N/V: Denies  Tolerating Diet: Clear liquid diet  -Flatus and -BM  OOB and ambulating starting POD1    Objective:     Blood pressure 132/78, pulse (!) 108, temperature 98.2 °F (36.8 °C), resp. rate 18, height 5' 2" (1.575 m), weight 70.3 kg (155 lb), SpO2 97 %. ,Body mass index is 28.35 kg/m².       Intake/Output Summary (Last 24 hours) at 8/17/2023 1445  Last data filed at 8/17/2023 1321  Gross per 24 hour   Intake 1950 ml Output 420 ml   Net 1530 ml       Invasive Devices     Central Venous Catheter Line  Duration           Port A Cath 03/21/23 Right Internal jugular 149 days          Peripheral Intravenous Line  Duration           Peripheral IV 08/17/23 Dorsal (posterior); Left Hand <1 day    Peripheral IV 08/17/23 Right Arm <1 day          Drain  Duration           Ureteral Internal Stent Left ureter 6 Fr. 27 days    Ureteral Internal Stent Right ureter 7 Fr. 27 days    Closed/Suction Drain Right RLQ Bulb 19 Fr. <1 day    Ileostomy Loop RLQ <1 day    Urethral Catheter Latex;Straight-tip 16 Fr. <1 day                Physical Exam:    General: Pt is AAOx3, lying down in bed in mild acute distress secondary to pain and discomfort. Very somnolent in and out of sleep. HEENT: normocephalic, dry mucous membranes   Neck: Supple, non-tender, ROM intact   CV: RRR, no murmurs, gallops, rubs. S1 and S2. Resp: Lung sounds clear to auscultation B/L, normal respiratory effort no  wheezes, rhonchi, rhales   Abd: Soft, with mild tenderness to palpation of all 4 quadrants, non-distended, non-tympanitic. Hypoactive bowelsounds all 4 quadrants. No rebound or guarding. No CVA tenderness. Abdominal incisions clean, dry, intact. Ileostomy in place, scant serosanguineous output in bag.  NEPTALI drain bulb 25 cc sanguinous output. Kaur in place, 200 cc dark fran urine. Previous stoma site with matthew in place, top dressings with serosanguineous discharge. Ext: Warm with no cyanosis, no edema, no deformities. ROM intact   Skin: No rashes, bruises, ulcers. Neuro: Sensation intact all 4 extremities. 5+ strength all 4 extremities. Lab, Imaging and other studies:I have personally reviewed pertinent lab results.     VTE Pharmacologic Prophylaxis: Heparin  VTE Mechanical Prophylaxis: sequential compression device     Bebo Plata

## 2023-08-17 NOTE — PERIOPERATIVE NURSING NOTE
MD Valdivia called to PACU by RN to speak to patient. See MAR for medication administration. Plan of care discussed with MD and patient encouraged to utilize PCA for pain management.

## 2023-08-17 NOTE — INTERVAL H&P NOTE
Garcia Durand returns today in surgical discussion after neoadjuvant chemoradiation for middle rectal cancer, she completed this 5/25/23. We discussed Robotic, possible laparoscopic versus open low anterior resection, this will include colostomy reversal, she understands likely need for diverting loop ileostomy postradiation. We did discuss a one-step procedure keeping colostomy however she wanted attempts at reconstruction. We did discuss with her today that she is at multiple high risks giving her sarcoid, and liver disease, in a face-to-face, personal, informed consent process, the benefits, alternatives,   risks including not limited to bleeding, infection, risks of anesthesia, open surgery, DVT/PE, heart attack, stroke, death, damage to local structures(e.g. ureter, duodenum),anastomotic leak requiring reoperation, temporary versus permanent stoma. They understood these risks, signed informed consent, and wish to proceed. PLAN:   Robotic, possible laparoscopic versus open low anterior resection  No further lesions, discussed with PCP. H&P reviewed. After examining the patient I find no changes in the patients condition since the H&P had been written.     Vitals:    08/17/23 0625   BP: 112/67   Pulse: 89   Resp: 18   Temp: (!) 97.3 °F (36.3 °C)   SpO2: 97%

## 2023-08-17 NOTE — ANESTHESIA PREPROCEDURE EVALUATION
Procedure:  RESECTION COLON LOW ANTERIOR LAPAROSCOPIC WITH ROBOTICS (Abdomen)    Relevant Problems   CARDIO   (+) Chest pain   (+) Hypertension   (+) Nonrheumatic mitral valve regurgitation      GI/HEPATIC   (+) Malignant neoplasm of sigmoid colon (HCC)   (+) Rectal cancer (HCC)      /RENAL   (+) Kidney calculi   (+) R flank pain with bilateral hydronephrosis      HEMATOLOGY   (+) Other specified anemias      Other   (+) Hypokalemia   (+) Sarcoidosis      CT:INFRAHYOID NECK:  Aryepiglottic folds and piriform sinuses are normal. Small air-filled internal laryngocele in right paraglottic fat. Normal glottis and subglottic airway. PFTS:Impression   1. Normal spirometry   2. Normal lung volumes  3. Mild diffusion impairment    Anesthesia Plan  ASA Score- 3     Anesthesia Type- general with ASA Monitors. Additional Monitors: arterial line. Airway Plan: ETT. Comment: stress dose steroids   T&C. Plan Factors-Exercise tolerance (METS): >4 METS. Chart reviewed. EKG reviewed. Imaging results reviewed. Existing labs reviewed. Patient summary reviewed. Induction- intravenous. Postoperative Plan- Plan for postoperative opioid use. Planned trial extubation    Informed Consent- Anesthetic plan and risks discussed with patient. I personally reviewed this patient with the CRNA. Discussed and agreed on the Anesthesia Plan with the CRNA. Xavi Quezada

## 2023-08-17 NOTE — PROGRESS NOTES
RECTAL/GI MULTIDISCIPLINARY CASE REVIEW  PRE OP RECTAL CANCER PATIENTS    DATE: 8/17/2023      PRESENTING DOCTOR: Dr. Everett Brantley      DIAGNOSIS: Rectal cancer, stage IIA (cT3, cN0, cM0)      Anam June was presented at the Rectal/GI Multidisciplinary Conference today. IMAGING REVIEWED:  6/15/2023- CT CAP  7/19/2023- MRI pelvis rectal cancer staging    COMPLETED TOTAL NEOADJUVANT TREATMENT DATE:  Completed on 5/27/2023    TREATMENT RESPONSE PER MRI:  Incomplete response    SURGERY DATE:  8/17/2023    EXPECTED SURGERY:  Low Anterior Resection (LAR)    PHYSICIAN RECOMMENDED PLAN:    -Patient is scheduled for Low Anterior Resection today, 8/17/2023.   -Patient will be represented for tumor board discussion following surgical procedure. Team agreed to plan. The final treatment plan will be left to the discretion of the patient and the treating physician. DISCLAIMERS:  TO THE TREATING PHYSICIAN:  This conference is a meeting of clinicians from various specialty areas who evaluate and discuss patients for whom a multidisciplinary treatment approach is being considered. Please note that the above opinion was a consensus of the conference attendees and is intended only to assist in quality care of the discussed patient. The responsibility for follow up on the input given during the conference, along with any final decisions regarding plan of care, is that of the patient and the patient's provider. Accordingly, appointments have only been recommended based on this information and have NOT been scheduled unless otherwise noted. TO THE PATIENT:  This summary is a brief record of major aspects of your cancer treatment. You may choose to share a copy with any of your doctors or nurses. However, this is not a detailed or comprehensive record of your care.       NCCN guidelines were readily available for review at this discussion

## 2023-08-18 LAB
ANION GAP SERPL CALCULATED.3IONS-SCNC: 6 MMOL/L
BASOPHILS # BLD AUTO: 0.02 THOUSANDS/ÂΜL (ref 0–0.1)
BASOPHILS NFR BLD AUTO: 0 % (ref 0–1)
BUN SERPL-MCNC: 15 MG/DL (ref 5–25)
CALCIUM SERPL-MCNC: 8.6 MG/DL (ref 8.3–10.1)
CHLORIDE SERPL-SCNC: 109 MMOL/L (ref 96–108)
CO2 SERPL-SCNC: 26 MMOL/L (ref 21–32)
CREAT SERPL-MCNC: 0.96 MG/DL (ref 0.6–1.3)
EOSINOPHIL # BLD AUTO: 0 THOUSAND/ÂΜL (ref 0–0.61)
EOSINOPHIL NFR BLD AUTO: 0 % (ref 0–6)
ERYTHROCYTE [DISTWIDTH] IN BLOOD BY AUTOMATED COUNT: 14.9 % (ref 11.6–15.1)
GFR SERPL CREATININE-BSD FRML MDRD: 66 ML/MIN/1.73SQ M
GLUCOSE SERPL-MCNC: 156 MG/DL (ref 65–140)
GLUCOSE SERPL-MCNC: 236 MG/DL (ref 65–140)
GLUCOSE SERPL-MCNC: 277 MG/DL (ref 65–140)
GLUCOSE SERPL-MCNC: 287 MG/DL (ref 65–140)
HCT VFR BLD AUTO: 28.8 % (ref 34.8–46.1)
HGB BLD-MCNC: 9.4 G/DL (ref 11.5–15.4)
IMM GRANULOCYTES # BLD AUTO: 0.09 THOUSAND/UL (ref 0–0.2)
IMM GRANULOCYTES NFR BLD AUTO: 1 % (ref 0–2)
LYMPHOCYTES # BLD AUTO: 0.56 THOUSANDS/ÂΜL (ref 0.6–4.47)
LYMPHOCYTES NFR BLD AUTO: 4 % (ref 14–44)
MAGNESIUM SERPL-MCNC: 1.9 MG/DL (ref 1.6–2.6)
MCH RBC QN AUTO: 29.8 PG (ref 26.8–34.3)
MCHC RBC AUTO-ENTMCNC: 32.6 G/DL (ref 31.4–37.4)
MCV RBC AUTO: 91 FL (ref 82–98)
MONOCYTES # BLD AUTO: 0.57 THOUSAND/ÂΜL (ref 0.17–1.22)
MONOCYTES NFR BLD AUTO: 4 % (ref 4–12)
NEUTROPHILS # BLD AUTO: 12.07 THOUSANDS/ÂΜL (ref 1.85–7.62)
NEUTS SEG NFR BLD AUTO: 91 % (ref 43–75)
NRBC BLD AUTO-RTO: 0 /100 WBCS
PHOSPHATE SERPL-MCNC: 3.5 MG/DL (ref 2.7–4.5)
PLATELET # BLD AUTO: 252 THOUSANDS/UL (ref 149–390)
PLATELET BLD QL SMEAR: ADEQUATE
PMV BLD AUTO: 9.2 FL (ref 8.9–12.7)
POTASSIUM SERPL-SCNC: 3.2 MMOL/L (ref 3.5–5.3)
RBC # BLD AUTO: 3.15 MILLION/UL (ref 3.81–5.12)
RBC MORPH BLD: NORMAL
SODIUM SERPL-SCNC: 141 MMOL/L (ref 135–147)
WBC # BLD AUTO: 13.31 THOUSAND/UL (ref 4.31–10.16)

## 2023-08-18 PROCEDURE — 97167 OT EVAL HIGH COMPLEX 60 MIN: CPT

## 2023-08-18 PROCEDURE — 84100 ASSAY OF PHOSPHORUS: CPT | Performed by: SURGERY

## 2023-08-18 PROCEDURE — 80048 BASIC METABOLIC PNL TOTAL CA: CPT | Performed by: SURGERY

## 2023-08-18 PROCEDURE — 83735 ASSAY OF MAGNESIUM: CPT | Performed by: SURGERY

## 2023-08-18 PROCEDURE — 85025 COMPLETE CBC W/AUTO DIFF WBC: CPT | Performed by: SURGERY

## 2023-08-18 PROCEDURE — 97116 GAIT TRAINING THERAPY: CPT

## 2023-08-18 PROCEDURE — 97163 PT EVAL HIGH COMPLEX 45 MIN: CPT

## 2023-08-18 PROCEDURE — 99232 SBSQ HOSP IP/OBS MODERATE 35: CPT | Performed by: INTERNAL MEDICINE

## 2023-08-18 PROCEDURE — 82948 REAGENT STRIP/BLOOD GLUCOSE: CPT

## 2023-08-18 PROCEDURE — 99024 POSTOP FOLLOW-UP VISIT: CPT | Performed by: COLON & RECTAL SURGERY

## 2023-08-18 RX ORDER — DEXTROSE MONOHYDRATE, SODIUM CHLORIDE, AND POTASSIUM CHLORIDE 50; 1.49; 4.5 G/1000ML; G/1000ML; G/1000ML
100 INJECTION, SOLUTION INTRAVENOUS CONTINUOUS
Status: DISCONTINUED | OUTPATIENT
Start: 2023-08-18 | End: 2023-08-18

## 2023-08-18 RX ORDER — MORPHINE SULFATE 4 MG/ML
3 INJECTION, SOLUTION INTRAMUSCULAR; INTRAVENOUS EVERY 4 HOURS
Status: DISCONTINUED | OUTPATIENT
Start: 2023-08-18 | End: 2023-08-20

## 2023-08-18 RX ORDER — POTASSIUM CHLORIDE 20 MEQ/1
40 TABLET, EXTENDED RELEASE ORAL 2 TIMES DAILY
Status: COMPLETED | OUTPATIENT
Start: 2023-08-18 | End: 2023-08-18

## 2023-08-18 RX ORDER — SODIUM CHLORIDE, SODIUM GLUCONATE, SODIUM ACETATE, POTASSIUM CHLORIDE, MAGNESIUM CHLORIDE, SODIUM PHOSPHATE, DIBASIC, AND POTASSIUM PHOSPHATE .53; .5; .37; .037; .03; .012; .00082 G/100ML; G/100ML; G/100ML; G/100ML; G/100ML; G/100ML; G/100ML
75 INJECTION, SOLUTION INTRAVENOUS CONTINUOUS
Status: DISCONTINUED | OUTPATIENT
Start: 2023-08-18 | End: 2023-08-19

## 2023-08-18 RX ORDER — SODIUM CHLORIDE, SODIUM LACTATE, POTASSIUM CHLORIDE, CALCIUM CHLORIDE 600; 310; 30; 20 MG/100ML; MG/100ML; MG/100ML; MG/100ML
75 INJECTION, SOLUTION INTRAVENOUS CONTINUOUS
Status: DISCONTINUED | OUTPATIENT
Start: 2023-08-18 | End: 2023-08-18

## 2023-08-18 RX ORDER — GABAPENTIN 100 MG/1
200 CAPSULE ORAL 3 TIMES DAILY
Status: DISCONTINUED | OUTPATIENT
Start: 2023-08-18 | End: 2023-08-21 | Stop reason: HOSPADM

## 2023-08-18 RX ADMIN — POTASSIUM CHLORIDE 40 MEQ: 1500 TABLET, EXTENDED RELEASE ORAL at 09:04

## 2023-08-18 RX ADMIN — MORPHINE SULFATE 3 MG: 4 INJECTION, SOLUTION INTRAMUSCULAR; INTRAVENOUS at 11:02

## 2023-08-18 RX ADMIN — METHOCARBAMOL 500 MG: 500 TABLET ORAL at 22:30

## 2023-08-18 RX ADMIN — MORPHINE SULFATE 2 MG: 2 INJECTION, SOLUTION INTRAMUSCULAR; INTRAVENOUS at 05:17

## 2023-08-18 RX ADMIN — SODIUM CHLORIDE, SODIUM GLUCONATE, SODIUM ACETATE, POTASSIUM CHLORIDE, MAGNESIUM CHLORIDE, SODIUM PHOSPHATE, DIBASIC, AND POTASSIUM PHOSPHATE 75 ML/HR: .53; .5; .37; .037; .03; .012; .00082 INJECTION, SOLUTION INTRAVENOUS at 22:39

## 2023-08-18 RX ADMIN — GABAPENTIN 200 MG: 100 CAPSULE ORAL at 22:30

## 2023-08-18 RX ADMIN — MORPHINE SULFATE 2 MG: 2 INJECTION, SOLUTION INTRAMUSCULAR; INTRAVENOUS at 16:03

## 2023-08-18 RX ADMIN — MORPHINE SULFATE 3 MG: 4 INJECTION, SOLUTION INTRAMUSCULAR; INTRAVENOUS at 19:46

## 2023-08-18 RX ADMIN — GABAPENTIN 100 MG: 100 CAPSULE ORAL at 08:33

## 2023-08-18 RX ADMIN — URSODIOL 900 MG: 300 CAPSULE ORAL at 08:37

## 2023-08-18 RX ADMIN — ACETAMINOPHEN 975 MG: 325 TABLET, FILM COATED ORAL at 05:19

## 2023-08-18 RX ADMIN — SODIUM CHLORIDE, SODIUM LACTATE, POTASSIUM CHLORIDE, AND CALCIUM CHLORIDE 125 ML/HR: .6; .31; .03; .02 INJECTION, SOLUTION INTRAVENOUS at 03:32

## 2023-08-18 RX ADMIN — GABAPENTIN 200 MG: 100 CAPSULE ORAL at 16:01

## 2023-08-18 RX ADMIN — AMLODIPINE BESYLATE 20 MG: 10 TABLET ORAL at 08:33

## 2023-08-18 RX ADMIN — HEPARIN SODIUM 5000 UNITS: 5000 INJECTION INTRAVENOUS; SUBCUTANEOUS at 22:30

## 2023-08-18 RX ADMIN — MORPHINE SULFATE 2 MG: 2 INJECTION, SOLUTION INTRAMUSCULAR; INTRAVENOUS at 22:30

## 2023-08-18 RX ADMIN — MORPHINE SULFATE 2 MG: 2 INJECTION, SOLUTION INTRAMUSCULAR; INTRAVENOUS at 08:33

## 2023-08-18 RX ADMIN — HEPARIN SODIUM 5000 UNITS: 5000 INJECTION INTRAVENOUS; SUBCUTANEOUS at 14:36

## 2023-08-18 RX ADMIN — ACETAMINOPHEN 975 MG: 325 TABLET, FILM COATED ORAL at 22:30

## 2023-08-18 RX ADMIN — MORPHINE SULFATE 3 MG: 4 INJECTION, SOLUTION INTRAMUSCULAR; INTRAVENOUS at 23:30

## 2023-08-18 RX ADMIN — METHOCARBAMOL 500 MG: 500 TABLET ORAL at 05:19

## 2023-08-18 RX ADMIN — SODIUM CHLORIDE, SODIUM GLUCONATE, SODIUM ACETATE, POTASSIUM CHLORIDE, MAGNESIUM CHLORIDE, SODIUM PHOSPHATE, DIBASIC, AND POTASSIUM PHOSPHATE 75 ML/HR: .53; .5; .37; .037; .03; .012; .00082 INJECTION, SOLUTION INTRAVENOUS at 09:50

## 2023-08-18 RX ADMIN — ACETAMINOPHEN 975 MG: 325 TABLET, FILM COATED ORAL at 14:36

## 2023-08-18 RX ADMIN — MORPHINE SULFATE 2 MG: 2 INJECTION, SOLUTION INTRAMUSCULAR; INTRAVENOUS at 00:30

## 2023-08-18 RX ADMIN — METHOCARBAMOL 500 MG: 500 TABLET ORAL at 14:36

## 2023-08-18 RX ADMIN — PREDNISONE 20 MG: 20 TABLET ORAL at 08:33

## 2023-08-18 RX ADMIN — POTASSIUM CHLORIDE 40 MEQ: 1500 TABLET, EXTENDED RELEASE ORAL at 17:08

## 2023-08-18 RX ADMIN — HEPARIN SODIUM 5000 UNITS: 5000 INJECTION INTRAVENOUS; SUBCUTANEOUS at 05:19

## 2023-08-18 NOTE — WOUND OSTOMY CARE
Wound/ostomy team consulted for 55 yo female s/p POD #1 loop ileostomy creation. Patient has history of colostomy and is familiar with ostomy care. Patient seen today and given pamphlet "Living with an Ileostomy." Went over differences such as stool being more loose and how to avoid blockages. Went over pouch changing steps and schedule. Patient was not interested in pouch change or anything further teaching at this time. Will check back with patient Monday before discharge. Patient states that she is in too much pain at this time to continue. One piece ostomy pouch is intact and stoma is red and appears healthy.       Brianne HANCOCKN, RN, [facility]

## 2023-08-18 NOTE — CASE MANAGEMENT
Case Management Assessment & Discharge Planning Note    Patient name Bre Long  Location [address] [place] [address] Road [address]/[facility] [id number] MRN [id number]  : 1965 Date 2023       Current Admission Date: 2023  Current Admission Diagnosis:Rectal cancer [facility])   Patient Active Problem List    Diagnosis Date Noted   • Herpes zoster without complication    • Preop examination 2023   • Rectal cancer ([address]) 2023   • Drug-induced diarrhea 2023   • Drug-induced constipation 2023   • Palliative care patient 2023   • Cancer related pain 2023   • Port-A-Cath in place 2023   • Abnormal TSH 03/15/2023   • Dysuria 2023   • Chest pain 2023   • Abnormal CT of the chest 2023   • Kidney calculi    • Other specified anemias 02/15/2023   • Thrombocytopenia ([address]) 02/15/2023   • Nonrheumatic mitral valve regurgitation 02/15/2023   • Advance directive discussed with patient 02/15/2023   • Gross hematuria 02/15/2023   • Skin lesion 02/15/2023   • Bleeding from colostomy stoma ([address]) 2023   • Adrenal nodule ([address]) 2023   • Malignant neoplasm of sigmoid colon ([address]) 2022   • History of Clostridium difficile colitis 2022   • Other hemochromatosis 2022   • Hypertension 2022   • Sarcoidosis 2022   • Impaired fasting glucose 2022   • Hypokalemia 2022   • Transaminitis 2022   • R flank pain with bilateral hydronephrosis 2022      LOS (days): 1  Geometric Mean LOS (GMLOS) (days): 2.40  Days to GMLOS:1.4     OBJECTIVE:    Risk of Unplanned Readmission Score: 25.61         Current admission status: Inpatient       Preferred Pharmacy:   [facility] [address], [place] - [address]  [address]  [place] [address]  Phone: [phone] Fax: [phone] ([place], [place] - [address] [facility] [address]  [address] [place] [id number]  Phone: [phone] Fax: [phone] [place] [address], 10 [address] [facility] [address] [facility] [address] CHAN [facility] [address] [facility] [address]  Phone: [phone] Fax: [phone]    Primary Care Provider:  Claudia Coleman MD    Primary Insurance: Vinny Syed  Secondary Insurance:     ASSESSMENT:  [place], [address] & [facility] Representative - Relative   Primary Phone: [phone] (Mobile)                         Readmission Root Cause  30 Day Readmission: No    Patient Information  Admitted from[de-identified] Home  Mental Status: Alert  During Assessment patient was accompanied by: Not accompanied during assessment  Assessment information provided by[de-identified] Patient  Primary Caregiver: Self  Support Systems: Self, Spouse/significant other, Daughter  [place] of Residence: [address] do you live in?: ELLA  Type of Current Residence: [address]  Upon entering residence, is there a bedroom on the main floor (no further steps)?: Yes  Upon entering residence, is there a bathroom on the main floor (no further steps)?: Yes  In the last 12 months, was there a time when you were not able to pay the mortgage or rent on time?: No  In the last 12 months, was there a time when you did not have a steady place to sleep or slept in a shelter (including now)?: No  Homeless/housing insecurity resource given?: N/A  Living Arrangements: Lives w/ Spouse/significant other, Lives w/ Daughter  Is patient a ?: No    Activities of Daily Living Prior to Admission  Functional Status: Independent  Completes ADLs independently?: Yes  Ambulates independently?: Yes  Does patient use assisted devices?: No  Does patient currently own DME?: No  Does patient have a history of Outpatient Therapy (PT/OT)?: No  Does the patient have a history of Short-Term Rehab?: No  Does patient have a history of HHC?: No  Does patient currently have [address]?: No         Patient Information Continued  Income Source: Unemployed  Does patient have prescription coverage?: Yes  Within the past 12 months, you worried that your food would run out before you got the money to buy more.: Never true  Within the past 12 months, the food you bought just didn't last and you didn't have money to get more.: Never true  Food insecurity resource given?: N/A  Does patient receive dialysis treatments?: No  Does patient have a history of substance abuse?: No  Does patient have a history of Mental Health Diagnosis?: No         Means of Transportation  In the past 12 months, has lack of transportation kept you from medical appointments or from getting medications?: No  In the past 12 months, has lack of transportation kept you from meetings, work, or from getting things needed for daily living?: No        DISCHARGE DETAILS:    Discharge planning discussed with[de-identified] patient  Freedom of Choice: Yes  Comments - Freedom of Choice: agreeable to referral to [address] [place] [address] Reggie contacted family/caregiver?: No- see comments (patient alert & oriented)  Were Treatment Team discharge recommendations reviewed with patient/caregiver?: Yes  Did patient/caregiver verbalize understanding of patient care needs?: Yes  Were patient/caregiver advised of the risks associated with not following Treatment Team discharge recommendations?: Yes    Contacts  Patient Contacts: Agata Johnson, ex-  Relationship to Patient[de-identified] Family  Contact Method: Phone  Phone Number: [phone]  Reason/Outcome: Emergency [address]         Is the patient interested in [facility] at discharge?: Yes  [facility] requested[de-identified] Nursing, Physical [address] [facility] Name[de-identified] [place] Provider[de-identified] PCP  Home Health Services Needed[de-identified] Post-Op Care and Assessment, Strengthening/Theraputic Exercises to Improve Function, Evaluate Functional Status and Safety, Gait/ADL Training  Homebound Criteria Met[de-identified] Requires the Assistance of Another Person for Safe Ambulation or to Leave the Home  Supporting Clincal Findings[de-identified] Limited Endurance                   Treatment Team Recommendation: Home with [address]  Discharge Destination Plan[de-identified] Home with [email or web address] at Discharge : Family                   Patient/caregiver received discharge checklist.  Content reviewed. Patient/caregiver encouraged to participate in discharge plan of care prior to discharge home. CM reviewed d/c planning process including the following: identifying help at home, patient preference for d/c planning needs, Discharge Lounge, [facility] Meds to Bed program, availability of treatment team to discuss questions or concerns patient and/or family may have regarding understanding medications and recognizing signs and symptoms once discharged. CM also encouraged patient to follow up with all recommended appointments after discharge. Patient advised of importance for patient and family to participate in managing patient’s medical well being. Additional Comments: Introduced self and role to patient at bedside. Patient resides with ex- and children, independent at baseline. Patient agreeable to [facility] for  home nursing/PT, and may need a walker at d/c. CM will continue to follow for d/c needs and place referrals as appropriate.

## 2023-08-18 NOTE — PHYSICAL THERAPY NOTE
Physical Therapy Evaluation     Patient's Name: [facility]    Admitting Diagnosis  Rectal cancer ([address]) [C20]    Problem List  Patient Active Problem List   Diagnosis    Malignant neoplasm of sigmoid colon ([address])    History of Clostridium difficile colitis    Other hemochromatosis    Hypertension    Sarcoidosis    Impaired fasting glucose    Hypokalemia    Transaminitis    R flank pain with bilateral hydronephrosis    Adrenal nodule ([address])    Bleeding from colostomy stoma ([address])    Other specified anemias    Thrombocytopenia ([address])    Nonrheumatic mitral valve regurgitation    Advance directive discussed with patient    Gross hematuria    Skin lesion    Abnormal CT of the chest    Kidney calculi    Dysuria    Chest pain    Abnormal TSH    Port-A-Cath in place    Drug-induced constipation    Palliative care patient    Cancer related pain    Drug-induced diarrhea    Rectal cancer ([address])    Preop examination    Herpes zoster without complication       Past Medical History  Past Medical History:   Diagnosis Date    Cancer ([address])     Colon cancer ([address])     Fall     Headache     Hemochromatosis, unspecified     History of transfusion     2022 - no adverse reaction    Hypertension     Kidney calculi     Kidney stone     Liver disease     hemangioma    Muscle weakness     Personal history of COVID-19 12/2022    Sarcoidosis     Seizures ([address])     2022       Past Surgical History  Past Surgical History:   Procedure Laterality Date    ABSCESS DRAINAGE      left breast    BREAST BIOPSY      CHEST TUBE INSERTION      COLON SURGERY      colostomy    COLONOSCOPY      FL GUIDED CENTRAL VENOUS ACCESS DEVICE INSERTION  03/21/2023    FL RETROGRADE PYELOGRAM  01/23/2023    FL RETROGRADE PYELOGRAM  04/03/2023    FL RETROGRADE PYELOGRAM  7/21/2023    IR BIOPSY LIVER MASS  05/09/2023    LUNG BIOPSY      CO CYSTO BLADDER W/URETERAL CATHETERIZATION Bilateral 07/21/2023    Procedure: CYSTOSCOPY URETEROSCOPY RETROGRADE PYELOGRAM WITH BILATERAL STENT URETERAL EXCHANGE; LEFT LASER LITHOTRIPSY AND STONE BASKET RETRIEVAL;  Surgeon: Judy Xavier MD;  [facility];  Service: Urology    GA CYSTO/URETERO W/LITHOTRIPSY &INDWELL STENT INSRT Bilateral 01/23/2023    Procedure: CYSTOSCOPY  RIGHT URETEROSCOPY WITH LITHOTRIPSY HOLMIUM LASER, BILATERAL RETROGRADE PYELOGRAM AND BILATERAL  URETERAL STENT INSERTION;  Surgeon: Judy Xavier MD;  [facility];  Service: Urology    GA CYSTO/URETERO W/LITHOTRIPSY &INDWELL STENT INSRT Right 04/03/2023    Procedure: RIGHT URETEROSCOPY WITH LITHOTRIPSY HOLMIUM LASER, RETROGRADE PYELOGRAM; BILATERAL EXCHANGE STENT URETERAL;  Surgeon: Judy Xavier MD;  [facility];  Service: Urology    GA INSJ TUNNELED CTR VAD W/SUBQ PORT AGE 5 YR/> N/A 03/21/2023    Procedure: INSERTION VENOUS PORT ( PORT-A-CATH) IR;  Surgeon: Eduardo Brown DO;  [facility];  Service: Interventional Radiology    GA LAPS COLECTOMY PRTL W/COLOPXTSTMY LW ANAST N/A 8/17/2023    Procedure: RESECTION COLON LOW ANTERIOR LAPAROSCOPIC WITH ROBOTICS;  Surgeon: Saad Vargas MD;  [facility];  Service: Colorectal    TONSILLECTOMY      URINARY SURGERY Bilateral     bilateral stents          08/18/23 1040   PT Last Visit   PT Visit Date 08/18/23   Note Type   Note type Evaluation   End of Consult   Patient Position at End of Consult All needs within reach; Bedside chair;Bed/Chair alarm activated   Pain Assessment   Pain Assessment Tool 0-10   Pain Score 5   Pain Location/Orientation Location: Abdomen   Hospital Pain Intervention(s) Repositioned; Ambulation/increased activity   Restrictions/Precautions   Weight Bearing Precautions Per Order No   Other Precautions Multiple lines;Pain; Fall Risk;O2  (2 L O2 , NEPTALI drain, ostomy)   Home Living   Type of [address] [place] [address] Dr Two level;1/2 bath on main level;Bed/bath upstairs   Bathroom Shower/Tub Walk-in shower   Bathroom Toilet Standard   Bathroom Equipment Shower chair   Home Equipment   (No DME)   Additional Comments Pt lives with ex- and children , 2STH with bed/bath on 2nd floor   Prior Function   Level of [place] Independent with ADLs; Independent with functional mobility; Independent with IADLS   Lives With [facility] Help From Family   IADLs Independent with driving; Independent with meal prep; Independent with medication management   Falls in the last 6 months 0   Vocational Unemployed   Comments Pt reports being Ind for mobility and ADLs, no AD used/ owned   Cognition   Overall Cognitive Status WFL   Arousal/Participation Alert   Attention Within functional limits   Orientation Level Oriented X4   Following Commands Follows one step commands without difficulty   Comments Pt anxious regarding mobility 2* pain , but cooperative during session. Subjective   Subjective Agreeable to mobilize   RLE Assessment   RLE Assessment WFL   LLE Assessment   LLE Assessment WFL   Bed Mobility   Supine to Sit 3  Moderate assistance   Additional items Assist x 1; Increased time required;Verbal cues;LE management   Sit to Supine Unable to assess   Additional Comments Pt noted to be in bed upon arrival.   Transfers   Sit to Stand 4  Minimal assistance   Additional items Assist x 1; Increased time required;Verbal cues   Stand to Sit 5  Supervision   Additional items Increased time required   Additional Comments Completes STS with RW   Ambulation/Elevation   Gait pattern Forward Flexion; Wide NILTON   Gait Assistance 4  Minimal assist   Additional items Assist x 1;Verbal cues   Assistive Device Rolling walker   Distance 2 ft from bed to chair + 60 ft , with chair follow   Balance   Static Sitting Fair +   Dynamic Sitting Fair   Static Standing Fair -   Dynamic Standing Poor +   Ambulatory Poor +   Endurance Deficit   Endurance Deficit Yes   Activity Tolerance   Activity Tolerance Patient limited by fatigue;Patient limited by pain   Medical Staff Made Aware Co-eval with OT 2* medical complexity and multiple co morbidities   Nurse Made Aware RN cleared ptf or therapy   Assessment   Prognosis Fair   Problem List Decreased strength;Decreased endurance;Decreased mobility;Pain   Assessment Pt is a 56 y.o. female seen for PT evaluation s/p admit to [address] [place] [address] on 8/17/2023. Pt was admitted with a primary dx of: Mid rectal cancer status post neoadjuvant chemoradiation, now status post robotic low anterior resection, reversal of diverting loop colostomy and diverting loop ileostomy on 8/18. PT now consulted for assessment of mobility and d/c needs. Pt with Ambulate orders. Pts current clinical presentation is Unstable/ Unpredictable (high complexity) due to Ongoing medical management for primary dx, Increased reliance on more restrictive AD compared to baseline, Decreased activity tolerance compared to baseline, NEPTALI drain in place at current time, s/p surgical intervention. Prior to admission, pt was Independent for mobility and ADLs. Upon evaluation, pt currently is requiring assistance for bed mobility, transfers and ambulation ,used RW during session. Limited mobility 2* abdominal pain and decreased activity tolerance . Pt presents at PT eval functioning below baseline and currently w/ overall mobility deficits 2* to: decreased endurance, pain, decreased activity tolerance compared to baseline, fall risk. Pt currently at a fall risk 2* to impairments listed above. Pt will continue to benefit from skilled acute PT interventions to address stated impairments; to maximize functional mobility; for ongoing pt training; and DME needs. At conclusion of PT session pt returned back in chair, all needs in reach and RN notified of session findings/recommendations with phone and call bell within reach. The patient's AM-PAC Basic Mobility Inpatient Short Form Raw Score is  17. A Raw score of greater than 16 suggests the patient may benefit from discharge to home.  Please also refer to the recommendation of the Physical Therapist for safe discharge planning. Recommend HHPT  upon hospital D/C as anticipate pt to improve in mobility level through hospital course,, as currently limited 2* pain. Barriers to Discharge   (Needs stair trial prior to dc)   Goals   Patient Goals to sit up   STG Expiration Date 09/01/23   Short Term Goal #1 STG 1. Pt will be able to perform bed mobility tasks with Mod I in order to improve overall functional mobility and assist in safe d/c. STG 2. Pt with sit EOB for at least 25 minutes at Ind level in order to strengthen abdominal musculature and assist in future transfers/ ambulation. STG 3. Pt will be able to perform functional transfer with Supervision in order to improve overall functional mobility and assist in safe d/c. STG 4. Pt will be able to ambulate at least 200 feet with least restrictive device with Supervision A in order to improve overall functional mobility and assist in safe d/c. STG 5. Pt will improve sitting/standing static/dynamic balance 1/2 grade in order to improve functional mobility and assist in safe d/c. STG 6. Pt will improve LE strength by 1/2 grade in order to improve functional mobility and assist in safe d/c. STG 7. Pt will be able to negotiate at least 12 stairs with least restrictive device with Supervision A in order to improve overall functional mobility and assist in safe d/c.   PT Treatment Day 0   Plan   Treatment/Interventions Functional transfer training;Elevations; Therapeutic exercise; Bed mobility;Gait training;OT;Spoke to nursing   PT Frequency 3-5x/wk   Recommendation   PT Discharge Recommendation Home with home health rehabilitation  (pp)   Equipment Recommended Walker   AM-PAC Basic Mobility Inpatient   Turning in Flat Bed Without Bedrails 3   Lying on Back to Sitting on Edge of Flat Bed Without Bedrails 3   Moving Bed to Chair 3   Standing Up From Chair Using Arms 3   Walk in Room 3   Climb 3-5 Stairs With Railing 3 Basic Mobility Inpatient Raw Score 18   Basic Mobility Standardized Score 41.05   Highest Level Of Mobility   JH-HLM Goal 6: Walk 10 steps or more   JH-HLM Achieved 7: Walk 25 feet or more   Modified Angeles Scale   Modified Angeles Scale 4         Carlos Sullivan, PT DPT

## 2023-08-18 NOTE — PLAN OF CARE
Problem: OCCUPATIONAL THERAPY ADULT  Goal: Performs self-care activities at highest level of function for planned discharge setting. See evaluation for individualized goals. Description: Treatment Interventions: ADL retraining, UE strengthening/ROM, Functional transfer training, Endurance training, Cognitive reorientation, Patient/family training, Equipment evaluation/education, Compensatory technique education, Energy conservation, Activityengagement  Equipment Recommended:  (Continue use of SC)       See flowsheet documentation for full assessment, interventions and recommendations. Note: Limitation: Decreased ADL status, Decreased UE ROM, Decreased UE strength, Decreased cognition, Decreased Safe judgement during ADL, Decreased endurance, Decreased high-level ADLs, Decreased self-care trans  Prognosis: Fair  Assessment: Pt is a 56 y.o. female who presented to [facility] on 8/17/2023 with rectal cancer. Pt s/p "robotic low anterior resection, reversal of diverting loop colostomy and diverting loop ileostomy" on 8/17. Pt  has a past medical history of [facility]), Colon cancer ([address] Fall, Headache, Hemochromatosis, unspecified, History of transfusion, Hypertension, Kidney calculi, Kidney stone, Liver disease, Muscle weakness, Personal history of COVID-19 (12/2022), Sarcoidosis, and Seizures ([address]). Pt greeted bedside for OT evaluation on 8/18/2023. Pt resides with ex- and children in a [facility]. PTA, Pt reports being I with ADLs/IADLs/no AD/ +. Pt with supportive family able to assist upon DC. Pt demonstrating the following occupational deficits: min A with UB ADLs, mod A with LB ADLs, mod A with bed mobility, min A with functional transfers, and min A with functional mobility with RW.  Limitations that impact functional performance include decreased ADL status, decreased UE ROM, decreased UE strength, decreased safe judgement during ADLs, decreased cognition, decreased endurance, decreased self care transfers, decreased high level ADLs and pain. Occupational performance areas to address ADL retraining, UE strengthening/ROM, endurance training, cognitive reorientation, Pt/caregiver education, equipment evaluation/education, compensatory technique education, energy conservation and activity engagement . Pt would benefit from continued skilled OT services while in hospital to maximize independence with ADLs. Will continue to follow Pt's progress. Pt would benefit from returning home with increased social support upon DC to maximize safety and independence with ADLs and functional tasks of choice.      OT Discharge Recommendation: No rehabilitation needs

## 2023-08-18 NOTE — PROGRESS NOTES
Progress note - Palliative and Supportive Care   Chidi Carr 56 y.o. female [id number]    Patient Active Problem List   Diagnosis   • Malignant neoplasm of sigmoid colon [facility])   • History of Clostridium difficile colitis   • Other hemochromatosis   • Hypertension   • Sarcoidosis   • Impaired fasting glucose   • Hypokalemia   • Transaminitis   • R flank pain with bilateral hydronephrosis   • Adrenal nodule (HCC)   • Bleeding from colostomy stoma [facility])   • Other specified anemias   • Thrombocytopenia (HCC)   • Nonrheumatic mitral valve regurgitation   • Advance directive discussed with patient   • Gross hematuria   • Skin lesion   • Abnormal CT of the chest   • Kidney calculi   • Dysuria   • Chest pain   • Abnormal TSH   • Port-A-Cath in place   • Drug-induced constipation   • Palliative care patient   • Cancer related pain   • Drug-induced diarrhea   • Rectal cancer (HCC)   • Preop examination   • Herpes zoster without complication     Active issues specifically addressed today include:   Rectal cancer status post neoadjuvant chemoradiation, postop day 0 for diagnostic laparoscopy with loop colostomy reversal and diverting loop ileostomy  Cancer associated pain  Recent herpes zoster  Sarcoidosis  Palliative care patient    Plan:  1. Symptom management -    -Increase IV morphine to 3 mg every 4 hours around-the-clock with hold parameters              -IV morphine 2 mg every 3 hours as needed for breakthrough pain   -Increase gabapentin to 200 mg every 8 hours              -Continue adjuncts               -Will defer bowel regimen to colorectal surgery     2. Goals -continue full cares no limits. Patient's main goal is for improved pain control.   -    3. Psychosocial support-time spent providing supportive listening, last social work team to follow-up as well.   -    4. [facility] [id number] will continue to follow, please notify on-call provider with any concerns about pain control.     Discussed with primary team Tutu Price PA-C       Code Status: Full- Level 1   Decisional apparatus:  Patient is competent on my exam today. If competence is lost, patient's substitute decision maker would default to N/A by [place] Act 169. Advance Directive / Living Will / POLST: No    Interval history:       Patient very tearful today on exam.  She states that her pain has remained constant and continues to describe it as debilitating and excruciating. She does note that the morphine makes her tired but it does help when she receives it. She is fearful of having to work with therapy as she is not sure how much this will hurt her. Did discuss going up on her morphine dose for the next 24 hours to see if we can achieve better pain control and then transitioning to oral medications and she is agreeable.     MEDICATIONS / ALLERGIES:     all current active meds have been reviewed and current meds:   Current Facility-Administered Medications   Medication Dose Route Frequency   • acetaminophen (TYLENOL) tablet 975 mg  975 mg Oral Q8H [address]   • amLODIPine (NORVASC) tablet 20 mg  20 mg Oral Daily   • gabapentin (NEURONTIN) capsule 200 mg  200 mg Oral TID   • heparin (porcine) subcutaneous injection 5,000 Units  5,000 Units Subcutaneous Q8H [address]   • lactated ringers bolus 1,000 mL  1,000 mL Intravenous Once PRN    And   • lactated ringers bolus 1,000 mL  1,000 mL Intravenous Once PRN   • methocarbamol (ROBAXIN) tablet 500 mg  500 mg Oral Q8H [address]   • morphine injection 2 mg  2 mg Intravenous Q2H PRN   • morphine injection 3 mg  3 mg Intravenous Q4H   • multi-electrolyte (PLASMALYTE-A/ISOLYTE-S PH 7.4) IV solution  75 mL/hr Intravenous Continuous   • ondansetron (ZOFRAN) injection 4 mg  4 mg Intravenous Q6H PRN   • oxybutynin (DITROPAN) tablet 5 mg  5 mg Oral Q6H PRN   • potassium chloride (K-DUR,KLOR-CON) CR tablet 40 mEq  40 mEq Oral BID   • predniSONE tablet 20 mg  20 mg Oral Daily   • sodium chloride 0.9 % bolus 1,000 mL  1,000 mL Intravenous Once PRN    And   • sodium chloride 0.9 % bolus 1,000 mL  1,000 mL Intravenous Once PRN   • ursodiol (ACTIGALL) capsule 900 mg  900 mg Oral Daily       Allergies   Allergen Reactions   • Oxaliplatin Shortness Of Breath     Reactions occurred with 2nd and 3rd infusions (about 1-3 hours from initiation of infusion) and required treatment with steroids and antihistamines. Please refer to allergy note on 2/7/2023 for detailed description of her reactions. OBJECTIVE:    Physical Exam  Physical Exam  Vitals and nursing note reviewed. Constitutional:       General: She is not in acute distress. Appearance: She is ill-appearing. HENT:      Head: Normocephalic and atraumatic. Right Ear: External ear normal.      Left Ear: External ear normal.   Eyes:      General:         Right eye: No discharge. Left eye: No discharge. Cardiovascular:      Rate and Rhythm: Regular rhythm. Tachycardia present. Pulmonary:      Effort: Pulmonary effort is normal. No respiratory distress. Abdominal:      General: There is no distension. Palpations: Abdomen is soft. Tenderness: There is abdominal tenderness. There is no guarding. Skin:     Coloration: Skin is pale. Neurological:      General: No focal deficit present. Psychiatric:      Comments: tearful           Lab Results:   I have personally reviewed pertinent labs. , CBC:   Lab Results   Component Value Date    WBC 13.31 (H) 08/18/2023    HGB 9.4 (L) 08/18/2023    HCT 28.8 (L) 08/18/2023    MCV 91 08/18/2023     08/18/2023    RBC 3.15 (L) 08/18/2023    MCH 29.8 08/18/2023    MCHC 32.6 08/18/2023    RDW 14.9 08/18/2023    MPV 9.2 08/18/2023    NRBC 0 08/18/2023   , CMP:   Lab Results   Component Value Date    SODIUM 141 08/18/2023    K 3.2 (L) 08/18/2023     (H) 08/18/2023    CO2 26 08/18/2023    BUN 15 08/18/2023    CREATININE 0.96 08/18/2023    CALCIUM 8.6 08/18/2023    EGFR 66 08/18/2023   , PT/PTT:No results found for: "PT", "PTT"  Imaging Studies: Reviewed  EKG, Pathology, and Other Studies: Reviewed    Counseling / Coordination of Care    Total floor / unit time spent today 25+ minutes. Greater than 50% of total time was spent with the patient and / or family counseling and / or coordination of care. A description of the counseling / coordination of care: Chart review, medication review, medication adjustments, supportive listening, discussion with primary    Portions of this document may have been created using dictation software and as such some "sound alike" terms may have been generated by the system. Do not hesitate to contact me with any questions or clarifications.

## 2023-08-18 NOTE — UTILIZATION REVIEW
Initial Clinical Review    Elective Inpatient surgical procedure  Age/Sex: 56 y.o. female  Surgery Date: 8/17/23  Procedure: Diagnostic laparoscopy  Robotic assisted low anterior resection  Low pelvic anastomosis, EEA 29 colorectal anastomosis, stapled  Left-sided loop colostomy reversal, including the accompanying colonic resection  Diverting Loop ileostomy right lower quadrant  Anesthesia: general  Operative Findings: -Stapled colorectal anastomosis EEA29, negative bubble leak test, intact anastomotic rings, good fluorescence Firefly, ~7cm from anal verge  -Colostomy site extended for HandPort to effect final step, colorectal anastomosis, avoiding midline or additional incision    POD#1 Progress Note:  No acute events overnight, tolerating clear liquids without nausea or vomiting, denies stoma output yet, pain controlled. Abdomen soft, appropriately tender, nondistended, incisions clean dry and intact, NEPTALI serosanguineous, stoma pink and healthy with no gas or stool output yet, bowel sweat in appliance. Wean O2 as able, encourage inc spirometry, advance diet as tolerated, continue IVF, DC Kaur and monitor UOP, maintain NEPTALI drain and monitor output, palliative care following and recommendations, multimodal analgesia with scheduled IV morphine. Josselin Baptise out on POD #2, 8/19.  PT OT tanner.    Admission Orders: Date/Time/Statement:   Admission Orders (From admission, onward)     Ordered        08/17/23 1203  Inpatient Admission  Once                      Orders Placed This Encounter   Procedures   • Inpatient Admission     Standing Status:   Standing     Number of Occurrences:   1     Order Specific Question:   Level of Care     Answer:   Med Surg [16]     Order Specific Question:   Estimated length of stay     Answer:   Inpatient Only Surgery     Vital Signs: /73   Pulse 98   Temp 97.9 °F (36.6 °C)   Resp 16   Ht 5' 2" (1.575 m)   Wt 70.3 kg (155 lb)   SpO2 95%   BMI 28.35 kg/m²     Pertinent Labs/Diagnostic Test Results:   No orders to display         Results from last 7 days   Lab Units 08/18/23  0524 08/17/23  1443 08/14/23  1155   WBC Thousand/uL 13.31*  --  9.76   HEMOGLOBIN g/dL 9.4*  --  11.0*   HEMATOCRIT % 28.8*  --  34.3*   PLATELETS Thousands/uL 252 382 195   NEUTROS ABS Thousands/µL 12.07*  --  9.06*         Results from last 7 days   Lab Units 08/18/23  0524 08/14/23  1155   SODIUM mmol/L 141 139   POTASSIUM mmol/L 3.2* 3.3*   CHLORIDE mmol/L 109* 104   CO2 mmol/L 26 24   ANION GAP mmol/L 6 11   BUN mg/dL 15 20   CREATININE mg/dL 0.96 0.86   EGFR ml/min/1.73sq m 66 76   CALCIUM mg/dL 8.6 9.6   MAGNESIUM mg/dL 1.9  --    PHOSPHORUS mg/dL 3.5  --      Results from last 7 days   Lab Units 08/14/23  1155   AST U/L 23   ALT U/L 48   ALK PHOS U/L 199*   TOTAL PROTEIN g/dL 6.9   ALBUMIN g/dL 3.8   TOTAL BILIRUBIN mg/dL 0.71     Results from last 7 days   Lab Units 08/17/23  2054 08/17/23  1552 08/17/23  1308 08/17/23  1207   POC GLUCOSE mg/dl 192* 185* 198* 247*     Results from last 7 days   Lab Units 08/18/23  0524 08/14/23  1155   GLUCOSE RANDOM mg/dL 156* 188*     Results from last 7 days   Lab Units 08/14/23  1155   PROTIME seconds 12.4   INR  0.87     Results from last 7 days   Lab Units 08/14/23  1155   CRP mg/L 12.4*   SED RATE mm/hour 47*     Diet: clear liquid with toast and crackers  Mobility: OOB and ambulation  DVT Prophylaxis: heparin, scd, ambulatory    Medications/Pain Control:   Scheduled Medications:  acetaminophen, 975 mg, Oral, Q8H STANLEY  amLODIPine, 20 mg, Oral, Daily  gabapentin, 200 mg, Oral, TID  heparin (porcine), 5,000 Units, Subcutaneous, Q8H STANLEY  methocarbamol, 500 mg, Oral, Q8H STANLEY  morphine injection, 3 mg, Intravenous, Q4H  potassium chloride, 40 mEq, Oral, BID  predniSONE, 20 mg, Oral, Daily  ursodiol, 900 mg, Oral, Daily      Continuous IV Infusions:  multi-electrolyte, 75 mL/hr, Intravenous, Continuous      PRN Meds:  lactated ringers, 1,000 mL, Intravenous, Once PRN   And  lactated ringers, 1,000 mL, Intravenous, Once PRN  morphine injection, 2 mg, Intravenous, Q2H PRN  ondansetron, 4 mg, Intravenous, Q6H PRN  oxybutynin, 5 mg, Oral, Q6H PRN  sodium chloride, 1,000 mL, Intravenous, Once PRN   And  sodium chloride, 1,000 mL, Intravenous, Once PRN        Network Utilization Review Department  ATTENTION: Please call with any questions or concerns to [phone] and carefully listen to the prompts so that you are directed to the right person. All voicemails are confidential.  Renae Albright all requests for admission clinical reviews, approved or denied determinations and any other requests to dedicated fax number below belonging to the campus where the patient is receiving treatment.  List of dedicated fax numbers for the Facilities:  [place] DENIALS (Administrative/Medical Necessity) [phone] [address] [facility] (Maternity/NICU/Pediatrics) [phone]   [address] [place] [address] Drive [phone]   [facility] [address] [facility] [phone]   [facility] 207 [facility] [address] [place] [address] [id number] [place] [address] [phone]   [address] [facility] [address] [facility] [address] [place] [address] Nn [phone]

## 2023-08-18 NOTE — OCCUPATIONAL THERAPY NOTE
Occupational Therapy Evaluation     Patient Name: Paco TOVARUTCRalfY Date: 8/18/2023  Problem List  Active Problems: There are no active Hospital Problems.     Past Medical History  Past Medical History:   Diagnosis Date    Cancer [facility])     Colon cancer ([address])     Fall     Headache     Hemochromatosis, unspecified     History of transfusion     2022 - no adverse reaction    Hypertension     Kidney calculi     Kidney stone     Liver disease     hemangioma    Muscle weakness     Personal history of COVID-19 12/2022    Sarcoidosis     Seizures ([address])     2022     Past Surgical History  Past Surgical History:   Procedure Laterality Date    ABSCESS DRAINAGE      left breast    BREAST BIOPSY      CHEST TUBE INSERTION      COLON SURGERY      colostomy    COLONOSCOPY      FL GUIDED CENTRAL VENOUS ACCESS DEVICE INSERTION  03/21/2023    FL RETROGRADE PYELOGRAM  01/23/2023    FL RETROGRADE PYELOGRAM  04/03/2023    FL RETROGRADE PYELOGRAM  7/21/2023    IR BIOPSY LIVER MASS  05/09/2023    LUNG BIOPSY      UT CYSTO BLADDER W/URETERAL CATHETERIZATION Bilateral 07/21/2023    Procedure: CYSTOSCOPY URETEROSCOPY RETROGRADE PYELOGRAM WITH BILATERAL STENT URETERAL EXCHANGE; LEFT LASER LITHOTRIPSY AND STONE BASKET RETRIEVAL;  Surgeon: Georges De La Cruz MD;  [facility];  Service: Urology    UT CYSTO/URETERO W/LITHOTRIPSY &INDWELL STENT INSRT Bilateral 01/23/2023    Procedure: CYSTOSCOPY  RIGHT URETEROSCOPY WITH LITHOTRIPSY HOLMIUM LASER, BILATERAL RETROGRADE PYELOGRAM AND BILATERAL  URETERAL STENT INSERTION;  Surgeon: Georges De La Cruz MD;  [facility];  Service: Urology    UT CYSTO/URETERO W/LITHOTRIPSY &INDWELL STENT INSRT Right 04/03/2023    Procedure: RIGHT URETEROSCOPY WITH LITHOTRIPSY HOLMIUM LASER, RETROGRADE PYELOGRAM; BILATERAL EXCHANGE STENT URETERAL;  Surgeon: Georges De La Cruz MD;  [facility];  Service: Urology    UT INSJ TUNNELED CTR VAD W/SUBQ PORT AGE 5 YR/> N/A 03/21/2023 Procedure: INSERTION VENOUS PORT ( PORT-A-CATH) IR;  Surgeon: Nestor Whitman DO;  [facility];  Service: Interventional Radiology    MD LAPS COLECTOMY PRTL W/COLOPXTSTMY LW ANAST N/A 8/17/2023    Procedure: RESECTION COLON LOW ANTERIOR LAPAROSCOPIC WITH ROBOTICS;  Surgeon: Jak Ferguson MD;  [facility];  Service: Colorectal    TONSILLECTOMY      URINARY SURGERY Bilateral     bilateral stents           08/18/23 1037   OT Last Visit   OT Visit Date 08/18/23   Note Type   Note type Evaluation   Pain Assessment   Pain Assessment Tool 0-10   Pain Score 5   Pain Location/Orientation Orientation: Bilateral;Location: Abdomen   Hospital Pain Intervention(s) Repositioned; Ambulation/increased activity   Restrictions/Precautions   Weight Bearing Precautions Per Order No   Other Precautions Multiple lines; Fall Risk;Pain;O2  (x1 NEPTALI, ileostomy, 2 L O2)   Home Living   Type of [address] [place] [address] Two level;1/2 bath on main level;Bed/bath upstairs   Bathroom Shower/Tub Walk-in shower   Bathroom Toilet Standard   Bathroom Equipment Shower chair   Home Equipment   (denies)   Additional Comments Pt resides with ex- and children in [facility]. Prior Function   Level of [place] Independent with ADLs; Independent with functional mobility; Independent with IADLS   Lives With Other (Comment); Family  (ex- and children)   Receives Help From Family   IADLs Independent with driving; Independent with meal prep; Independent with medication management   Falls in the last 6 months 0   Vocational Unemployed   Comments PTA, Pt reports being I with ADLs/IADLs/no AD/ +. Pt with supportive family able to assist upon DC.    Lifestyle   Autonomy I with ADLs/IADLs/no AD/ +   Reciprocal Relationships Supportive family   Service to Others Unemployed   Intrinsic Gratification Enjoys watching TV   Subjective   Subjective "I have to get up today?"   ADL   Where Assessed Edge of bed   Eating Assistance 7 Independent   Grooming Assistance 5  Supervision/Setup   UB Bathing Assistance 4  Minimal Assistance   LB Bathing Assistance 3  Moderate Assistance   UB Dressing Assistance 4  Minimal Assistance   UB Dressing Deficit Supervision/safety;Setup; Increased time to complete   LB Dressing Assistance 3  Moderate Assistance   LB Dressing Deficit   (Pt educated on compensatory technniqes with LB dressing.)   Toileting Assistance  4  Minimal Assistance   Functional Assistance 4  Minimal Assistance   Additional Comments Pt limited in ADL status 2* to increase pain with movement. Bed Mobility   Supine to Sit 3  Moderate assistance   Additional items Assist x 1; Increased time required;Verbal cues;LE management   Sit to Supine Unable to assess   Additional Comments Pt greeted supine in bed. Transfers   Sit to Stand 4  Minimal assistance   Additional items Assist x 1; Increased time required;Verbal cues   Stand to Sit 5  Supervision   Additional items Increased time required   Stand pivot 4  Minimal assistance   Additional items Assist x 2; Increased time required;Verbal cues   Additional Comments STS from recliner chair with RW, min Ax2 with functional SPT from EOB to recliner chair with B/L HHA   Functional Mobility   Functional Mobility 4  Minimal assistance   Additional Comments Min Ax1 with RW- short distances. Additional items Rolling walker   Balance   Static Sitting Fair   Dynamic Sitting Fair -   Static Standing Poor +   Dynamic Standing Poor +   Ambulatory Poor +   Activity Tolerance   Activity Tolerance Patient limited by fatigue  (Pt 95% on 2L O2 with functional mobility. Pt trialed off O2 on RA, however satting 86% and placed back on O2)   Medical Staff Made Aware Co-eval with COLLIN Thomas 2* to Pt's medical complexity and decreased endurance. Nurse Made Aware RN cleared/updated.    RUE Assessment   RUE Assessment WFL   LUE Assessment   LUE Assessment WFL   Hand Function   Gross Motor Coordination Functional   Fine Motor Coordination Functional   Sensation   Light Touch No apparent deficits   Psychosocial   Psychosocial (WDL) WDL   Cognition   Overall Cognitive Status WFL   Arousal/Participation Cooperative; Alert   Attention Within functional limits   Orientation Level Oriented X4   Memory Within functional limits   Following Commands Follows one step commands without difficulty   Comments Pt anxious and requires education/encouragement to participate during OT session. Pt happy she was able to walk and sit in the chair at end of session. Assessment   Limitation Decreased ADL status; Decreased UE ROM; Decreased UE strength;Decreased cognition;Decreased Safe judgement during ADL;Decreased endurance;Decreased high-level ADLs; Decreased self-care trans   Prognosis Fair   Assessment Pt is a 56 y.o. female who [facility] with rectal cancer. Pt s/p "robotic low anterior resection, reversal of diverting loop colostomy and diverting loop ileostomy" on 8/17. Pt  has a past medical history of Cancer [facility]), Colon cancer ([address] [facility]), Fall, Headache, Hemochromatosis, unspecified, History of transfusion, Hypertension, Kidney calculi, Kidney stone, Liver disease, Muscle weakness, Personal history of COVID-19 (12/2022), Sarcoidosis, and Seizures ([address] Pt greeted bedside for OT evaluation on 8/18/2023. Pt resides with ex- and children in a [facility]. PTA, Pt reports being I with ADLs/IADLs/no AD/ +. Pt with supportive family able to assist upon DC. Pt demonstrating the following occupational deficits: min A with UB ADLs, mod A with LB ADLs, mod A with bed mobility, min A with functional transfers, and min A with functional mobility with RW. Limitations that impact functional performance include decreased ADL status, decreased UE ROM, decreased UE strength, decreased safe judgement during ADLs, decreased cognition, decreased endurance, decreased self care transfers, decreased high level ADLs and pain.  Occupational performance areas to address ADL retraining, UE strengthening/ROM, endurance training, cognitive reorientation, Pt/caregiver education, equipment evaluation/education, compensatory technique education, energy conservation and activity engagement . Pt would benefit from continued skilled OT services while in hospital to maximize independence with ADLs. Will continue to follow Pt's progress. Pt would benefit from returning home with increased social support upon DC to maximize safety and independence with ADLs and functional tasks of choice. Goals   Patient Goals To sit in the chair. LTG Time Frame 10-14   Long Term Goal #1 See goals listed below. Plan   Treatment Interventions ADL retraining;UE strengthening/ROM; Functional transfer training; Endurance training;Cognitive reorientation;Patient/family training;Equipment evaluation/education; Compensatory technique education; Energy conservation; Activityengagement   Goal Expiration Date 09/01/23   OT Frequency 2-3x/wk   Recommendation   OT Discharge Recommendation No rehabilitation needs   Equipment Recommended   (Continue use of SC)   Additional Comments  The patient's raw score on the AM-PAC Daily Activity Inpatient Short Form is 17. A raw score of less than 19 suggests the patient may benefit from discharge to post-acute rehabilitation services. Please refer to the recommendation of the Occupational Therapist for safe discharge planning.    AM-PAC Daily Activity Inpatient   Lower Body Dressing 2   Bathing 2   Toileting 3   Upper Body Dressing 3   Grooming 3   Eating 4   Daily Activity Raw Score 17   Daily Activity Standardized Score (Calc for Raw Score >=11) 37.26   AM-PAC Applied Cognition Inpatient   Following a Speech/Presentation 4   Understanding Ordinary Conversation 4   Taking Medications 4   Remembering Where Things Are Placed or Put Away 4   Remembering List of 4-5 Errands 4   Taking Care of Complicated Tasks 4   Applied Cognition Raw Score 24   Applied Cognition Standardized Score 62.21   End of Consult   Education Provided Yes   Patient Position at End of Consult Bedside chair; All needs within reach   Nurse Communication Nurse aware of consult       GOALS:  Pt will complete UB ADLs with I in order to maximize participation with ADLs. Pt will complete LB ADLs with I in order to maximize safety with ADLs. Pt will complete toileting routine (transfer, hygiene, and clothing management) with I in order to return to prior level of function. Pt will complete bed mobility with I in order to maximize participation with ADLs. Pt will complete functional transfers at I level in order to increase participation with ADLs. Pt will increase dynamic standing balance to F+ in order to increase safety with ADLs. Pt will increase standing tolerance x10 min in order to increase participation with ADLs. Pt will complete functional mobility with AD PRN for item retrieval task at Jorge level in order to increase participation with ADLs. Pt will complete IADL tasks/simulation of IADLs tasks with I in order to return to PLOF. Pt will demonstrate G energy conservation techniques with ADLs/IADLs in order to reduce the risk of falls. Pt will be attentive 100% of the time for ongoing functional/formal cognitive assessment to assist with safe dc planning prn.         Jose Antonio Dias MS, OTR/L

## 2023-08-18 NOTE — RESTORATIVE TECHNICIAN NOTE
Restorative Technician Note      Patient Name: Travis Mejia     Restorative Tech Visit Date: 08/18/23  Note Type: Mobility (Pt requested pain meds prior to participation; coordinated with RN and saw pt after meds were administered/had time to take effect)  Patient Position Upon Consult: Bedside chair  Education Provided: Yes  Patient Position at End of Consult: Supine; All needs within reach    Pt declined participation despite education on the importance of mobility. States the pain just subsided and she needs to sleep. Educated that it is important to take advantage and walk when pain is decreased. Pt stated she will ring when she is ready.     Jaycee Null  DPT, Restorative Technician

## 2023-08-18 NOTE — PLAN OF CARE
Problem: MOBILITY - ADULT  Goal: Maintain or return to baseline ADL function  Description: INTERVENTIONS:  -  Assess patient's ability to carry out ADLs; assess patient's baseline for ADL function and identify physical deficits which impact ability to perform ADLs (bathing, care of mouth/teeth, toileting, grooming, dressing, etc.)  - Assess/evaluate cause of self-care deficits   - Assess range of motion  - Assess patient's mobility; develop plan if impaired  - Assess patient's need for assistive devices and provide as appropriate  - Encourage maximum independence but intervene and supervise when necessary  - Involve family in performance of ADLs  - Assess for home care needs following discharge   - Consider OT consult to assist with ADL evaluation and planning for discharge  - Provide patient education as appropriate  Outcome: Not Met  Goal: Maintains/Returns to pre admission functional level  Description: INTERVENTIONS:  - Perform BMAT or MOVE assessment daily.   - Set and communicate daily mobility goal to care team and patient/family/caregiver. - Collaborate with rehabilitation services on mobility goals if consulted  - Perform Range of Motion 4 times a day. - Reposition patient every 2 hours.   - Dangle patient 3 times a day  - Stand patient 3 times a day  - Ambulate patient 3 times a day  - Out of bed to chair 3 times a day   - Out of bed for meals 3 times a day  - Out of bed for toileting  - Record patient progress and toleration of activity level   Outcome: Not Met

## 2023-08-19 LAB
ANION GAP SERPL CALCULATED.3IONS-SCNC: 2 MMOL/L
BASOPHILS # BLD AUTO: 0.02 THOUSANDS/ÂΜL (ref 0–0.1)
BASOPHILS NFR BLD AUTO: 0 % (ref 0–1)
BUN SERPL-MCNC: 11 MG/DL (ref 5–25)
CALCIUM SERPL-MCNC: 8.6 MG/DL (ref 8.3–10.1)
CHLORIDE SERPL-SCNC: 111 MMOL/L (ref 96–108)
CO2 SERPL-SCNC: 28 MMOL/L (ref 21–32)
CREAT SERPL-MCNC: 0.72 MG/DL (ref 0.6–1.3)
EOSINOPHIL # BLD AUTO: 0.06 THOUSAND/ÂΜL (ref 0–0.61)
EOSINOPHIL NFR BLD AUTO: 1 % (ref 0–6)
ERYTHROCYTE [DISTWIDTH] IN BLOOD BY AUTOMATED COUNT: 15 % (ref 11.6–15.1)
GFR SERPL CREATININE-BSD FRML MDRD: 94 ML/MIN/1.73SQ M
GLUCOSE SERPL-MCNC: 112 MG/DL (ref 65–140)
GLUCOSE SERPL-MCNC: 161 MG/DL (ref 65–140)
GLUCOSE SERPL-MCNC: 225 MG/DL (ref 65–140)
GLUCOSE SERPL-MCNC: 235 MG/DL (ref 65–140)
HCT VFR BLD AUTO: 25.8 % (ref 34.8–46.1)
HGB BLD-MCNC: 8.5 G/DL (ref 11.5–15.4)
IMM GRANULOCYTES # BLD AUTO: 0.08 THOUSAND/UL (ref 0–0.2)
IMM GRANULOCYTES NFR BLD AUTO: 1 % (ref 0–2)
LYMPHOCYTES # BLD AUTO: 0.56 THOUSANDS/ÂΜL (ref 0.6–4.47)
LYMPHOCYTES NFR BLD AUTO: 5 % (ref 14–44)
MCH RBC QN AUTO: 30.1 PG (ref 26.8–34.3)
MCHC RBC AUTO-ENTMCNC: 32.9 G/DL (ref 31.4–37.4)
MCV RBC AUTO: 92 FL (ref 82–98)
MONOCYTES # BLD AUTO: 0.58 THOUSAND/ÂΜL (ref 0.17–1.22)
MONOCYTES NFR BLD AUTO: 5 % (ref 4–12)
NEUTROPHILS # BLD AUTO: 9.82 THOUSANDS/ÂΜL (ref 1.85–7.62)
NEUTS SEG NFR BLD AUTO: 88 % (ref 43–75)
NRBC BLD AUTO-RTO: 0 /100 WBCS
PLATELET # BLD AUTO: 212 THOUSANDS/UL (ref 149–390)
PMV BLD AUTO: 8.9 FL (ref 8.9–12.7)
POTASSIUM SERPL-SCNC: 3.5 MMOL/L (ref 3.5–5.3)
RBC # BLD AUTO: 2.82 MILLION/UL (ref 3.81–5.12)
SODIUM SERPL-SCNC: 141 MMOL/L (ref 135–147)
WBC # BLD AUTO: 11.12 THOUSAND/UL (ref 4.31–10.16)

## 2023-08-19 PROCEDURE — 82948 REAGENT STRIP/BLOOD GLUCOSE: CPT

## 2023-08-19 PROCEDURE — 80048 BASIC METABOLIC PNL TOTAL CA: CPT | Performed by: PHYSICIAN ASSISTANT

## 2023-08-19 PROCEDURE — 85025 COMPLETE CBC W/AUTO DIFF WBC: CPT | Performed by: PHYSICIAN ASSISTANT

## 2023-08-19 PROCEDURE — 99232 SBSQ HOSP IP/OBS MODERATE 35: CPT | Performed by: INTERNAL MEDICINE

## 2023-08-19 PROCEDURE — 99024 POSTOP FOLLOW-UP VISIT: CPT | Performed by: COLON & RECTAL SURGERY

## 2023-08-19 RX ORDER — INSULIN LISPRO 100 [IU]/ML
1-5 INJECTION, SOLUTION INTRAVENOUS; SUBCUTANEOUS
Status: DISCONTINUED | OUTPATIENT
Start: 2023-08-19 | End: 2023-08-21 | Stop reason: HOSPADM

## 2023-08-19 RX ORDER — POTASSIUM CHLORIDE 20 MEQ/1
40 TABLET, EXTENDED RELEASE ORAL ONCE
Status: COMPLETED | OUTPATIENT
Start: 2023-08-19 | End: 2023-08-19

## 2023-08-19 RX ADMIN — GABAPENTIN 200 MG: 100 CAPSULE ORAL at 07:45

## 2023-08-19 RX ADMIN — MORPHINE SULFATE 3 MG: 4 INJECTION, SOLUTION INTRAMUSCULAR; INTRAVENOUS at 03:33

## 2023-08-19 RX ADMIN — GABAPENTIN 200 MG: 100 CAPSULE ORAL at 21:55

## 2023-08-19 RX ADMIN — PREDNISONE 20 MG: 20 TABLET ORAL at 07:45

## 2023-08-19 RX ADMIN — MORPHINE SULFATE 3 MG: 4 INJECTION, SOLUTION INTRAMUSCULAR; INTRAVENOUS at 15:15

## 2023-08-19 RX ADMIN — INSULIN LISPRO 2 UNITS: 100 INJECTION, SOLUTION INTRAVENOUS; SUBCUTANEOUS at 16:57

## 2023-08-19 RX ADMIN — GABAPENTIN 200 MG: 100 CAPSULE ORAL at 15:15

## 2023-08-19 RX ADMIN — METHOCARBAMOL 500 MG: 500 TABLET ORAL at 15:15

## 2023-08-19 RX ADMIN — POTASSIUM CHLORIDE 40 MEQ: 1500 TABLET, EXTENDED RELEASE ORAL at 11:30

## 2023-08-19 RX ADMIN — HEPARIN SODIUM 5000 UNITS: 5000 INJECTION INTRAVENOUS; SUBCUTANEOUS at 21:55

## 2023-08-19 RX ADMIN — METHOCARBAMOL 500 MG: 500 TABLET ORAL at 05:11

## 2023-08-19 RX ADMIN — INSULIN LISPRO 2 UNITS: 100 INJECTION, SOLUTION INTRAVENOUS; SUBCUTANEOUS at 11:29

## 2023-08-19 RX ADMIN — MORPHINE SULFATE 3 MG: 4 INJECTION, SOLUTION INTRAMUSCULAR; INTRAVENOUS at 19:35

## 2023-08-19 RX ADMIN — MORPHINE SULFATE 3 MG: 4 INJECTION, SOLUTION INTRAMUSCULAR; INTRAVENOUS at 07:46

## 2023-08-19 RX ADMIN — HEPARIN SODIUM 5000 UNITS: 5000 INJECTION INTRAVENOUS; SUBCUTANEOUS at 05:11

## 2023-08-19 RX ADMIN — HEPARIN SODIUM 5000 UNITS: 5000 INJECTION INTRAVENOUS; SUBCUTANEOUS at 15:15

## 2023-08-19 RX ADMIN — INSULIN LISPRO 1 UNITS: 100 INJECTION, SOLUTION INTRAVENOUS; SUBCUTANEOUS at 21:56

## 2023-08-19 RX ADMIN — URSODIOL 900 MG: 300 CAPSULE ORAL at 07:47

## 2023-08-19 RX ADMIN — METHOCARBAMOL 500 MG: 500 TABLET ORAL at 21:54

## 2023-08-19 RX ADMIN — ACETAMINOPHEN 975 MG: 325 TABLET, FILM COATED ORAL at 21:54

## 2023-08-19 RX ADMIN — ACETAMINOPHEN 975 MG: 325 TABLET, FILM COATED ORAL at 15:15

## 2023-08-19 RX ADMIN — MORPHINE SULFATE 3 MG: 4 INJECTION, SOLUTION INTRAMUSCULAR; INTRAVENOUS at 11:29

## 2023-08-19 RX ADMIN — ACETAMINOPHEN 975 MG: 325 TABLET, FILM COATED ORAL at 05:11

## 2023-08-19 RX ADMIN — AMLODIPINE BESYLATE 20 MG: 10 TABLET ORAL at 07:45

## 2023-08-19 RX ADMIN — MORPHINE SULFATE 3 MG: 4 INJECTION, SOLUTION INTRAMUSCULAR; INTRAVENOUS at 23:32

## 2023-08-19 NOTE — PLAN OF CARE
Problem: PAIN - ADULT  Goal: Verbalizes/displays adequate comfort level or baseline comfort level  Description: Interventions:  - Encourage patient to monitor pain and request assistance  - Assess pain using appropriate pain scale  - Administer analgesics based on type and severity of pain and evaluate response  - Implement non-pharmacological measures as appropriate and evaluate response  - Consider cultural and social influences on pain and pain management  - Notify physician/advanced practitioner if interventions unsuccessful or patient reports new pain  Outcome: Progressing     Problem: INFECTION - ADULT  Goal: Absence or prevention of progression during hospitalization  Description: INTERVENTIONS:  - Assess and monitor for signs and symptoms of infection  - Monitor lab/diagnostic results  - Monitor all insertion sites, i.e. indwelling lines, tubes, and drains  - Monitor endotracheal if appropriate and nasal secretions for changes in amount and color  - [place] appropriate cooling/warming therapies per order  - Administer medications as ordered  - Instruct and encourage patient and family to use good hand hygiene technique  - Identify and instruct in appropriate isolation precautions for identified infection/condition  Outcome: Progressing  Goal: Absence of fever/infection during neutropenic period  Description: INTERVENTIONS:  - Monitor WBC    Outcome: Progressing     Problem: SAFETY ADULT  Goal: Patient will remain free of falls  Description: INTERVENTIONS:  - Educate patient/family on patient safety including physical limitations  - Instruct patient to call for assistance with activity   - Consult OT/PT to assist with strengthening/mobility   - Keep Call bell within reach  - Keep bed low and locked with side rails adjusted as appropriate  - Keep care items and personal belongings within reach  - Initiate and maintain comfort rounds  - Make Fall Risk Sign visible to staff  - Offer Toileting every 2 Hours, in advance of need  - Obtain necessary fall risk management equipment: walker  - Apply yellow socks and bracelet for high fall risk patients  - Consider moving patient to room near nurses station  Outcome: Progressing  Goal: Maintain or return to baseline ADL function  Description: INTERVENTIONS:  -  Assess patient's ability to carry out ADLs; assess patient's baseline for ADL function and identify physical deficits which impact ability to perform ADLs (bathing, care of mouth/teeth, toileting, grooming, dressing, etc.)  - Assess/evaluate cause of self-care deficits   - Assess range of motion  - Assess patient's mobility; develop plan if impaired  - Assess patient's need for assistive devices and provide as appropriate  - Encourage maximum independence but intervene and supervise when necessary  - Involve family in performance of ADLs  - Assess for home care needs following discharge   - Consider OT consult to assist with ADL evaluation and planning for discharge  - Provide patient education as appropriate  Outcome: Progressing  Goal: Maintains/Returns to pre admission functional level  Description: INTERVENTIONS:  - Perform BMAT or MOVE assessment daily.   - Set and communicate daily mobility goal to care team and patient/family/caregiver.    - Collaborate with rehabilitation services on mobility goals if consulted  - Stand patient 3 times a day  - Ambulate patient 3 times a day  - Out of bed to chair 3 times a day   - Out of bed for meals 3 times a day  - Out of bed for toileting  - Record patient progress and toleration of activity level   Outcome: Progressing     Problem: DISCHARGE PLANNING  Goal: Discharge to home or other facility with appropriate resources  Description: INTERVENTIONS:  - Identify barriers to discharge w/patient and caregiver  - Arrange for needed discharge resources and transportation as appropriate  - Identify discharge learning needs (meds, wound care, etc.)  - Arrange for interpretive services to assist at discharge as needed  - Refer to Case Management Department for coordinating discharge planning if the patient needs post-hospital services based on physician/advanced practitioner order or complex needs related to functional status, cognitive ability, or social support system  Outcome: Progressing     Problem: Knowledge Deficit  Goal: Patient/family/caregiver demonstrates understanding of disease process, treatment plan, medications, and discharge instructions  Description: Complete learning assessment and assess knowledge base.   Interventions:  - Provide teaching at level of understanding  - Provide teaching via preferred learning methods  Outcome: Progressing

## 2023-08-19 NOTE — PROGRESS NOTES
Progress note - Palliative and Supportive Care   Quintin Pacheco 56 y.o. female [id number]    Patient Active Problem List   Diagnosis   • Malignant neoplasm of sigmoid colon [facility])   • History of Clostridium difficile colitis   • Other hemochromatosis   • Hypertension   • Sarcoidosis   • Impaired fasting glucose   • Hypokalemia   • Transaminitis   • R flank pain with bilateral hydronephrosis   • Adrenal nodule (HCC)   • Bleeding from colostomy stoma [facility])   • Other specified anemias   • Thrombocytopenia (HCC)   • Nonrheumatic mitral valve regurgitation   • Advance directive discussed with patient   • Gross hematuria   • Skin lesion   • Abnormal CT of the chest   • Kidney calculi   • Dysuria   • Chest pain   • Abnormal TSH   • Port-A-Cath in place   • Drug-induced constipation   • Palliative care patient   • Cancer related pain   • Drug-induced diarrhea   • Rectal cancer (HCC)   • Preop examination   • Herpes zoster without complication     Active issues specifically addressed today include:   Rectal cancer status post neoadjuvant chemoradiation, postop day 0 for diagnostic laparoscopy with loop colostomy reversal and diverting loop ileostomy  Cancer associated pain  Recent herpes zoster  Sarcoidosis  Palliative care patient    Plan:  1. Symptom management - no changes today, transition to PO tomorrow. -IV morphine to 3 mg every 4 hours around-the-clock with hold parameters              -IV morphine 2 mg every 3 hours as needed for breakthrough pain   -gabapentin to 200 mg every 8 hours              -Continue adjuncts               -Will defer bowel regimen to colorectal surgery     2. Goals -continue full cares no limits. Patient's main goal is for improved pain control.   -    3. Psychosocial support-time spent providing supportive listening, last social work team to follow-up as well.   -    4.  Alena Peterson [id number] will continue to follow, please notify on-call provider with any concerns about pain control. Discussed with Dr. Lc Blackmon and Nirmala Fagan, [facility]       Code Status: Full- Level 1   Decisional apparatus:  Patient is competent on my exam today. If competence is lost, patient's substitute decision maker would default to N/A [place] Act 169. Advance Directive / Living Will / POLST: No    Interval history:       Patient appears markedly improved today. She states her pain is better controlled and is actually wanting to get cleaned up. We discussed current regimen and agreed to keep it as is for today and transition to orals tomorrow with plan for discharge Monday. MEDICATIONS / ALLERGIES:     all current active meds have been reviewed and current meds:   Current Facility-Administered Medications   Medication Dose Route Frequency   • acetaminophen (TYLENOL) tablet 975 mg  975 mg Oral Q8H [address]   • amLODIPine (NORVASC) tablet 20 mg  20 mg Oral Daily   • gabapentin (NEURONTIN) capsule 200 mg  200 mg Oral TID   • heparin (porcine) subcutaneous injection 5,000 Units  5,000 Units Subcutaneous Q8H [address]   • insulin lispro (HumaLOG) 100 units/mL subcutaneous injection 1-5 Units  1-5 Units Subcutaneous TID AC   • insulin lispro (HumaLOG) 100 units/mL subcutaneous injection 1-5 Units  1-5 Units Subcutaneous HS   • methocarbamol (ROBAXIN) tablet 500 mg  500 mg Oral Q8H [address]   • morphine injection 2 mg  2 mg Intravenous Q2H PRN   • morphine injection 3 mg  3 mg Intravenous Q4H   • ondansetron (ZOFRAN) injection 4 mg  4 mg Intravenous Q6H PRN   • oxybutynin (DITROPAN) tablet 5 mg  5 mg Oral Q6H PRN   • predniSONE tablet 20 mg  20 mg Oral Daily   • ursodiol (ACTIGALL) capsule 900 mg  900 mg Oral Daily       Allergies   Allergen Reactions   • Oxaliplatin Shortness Of Breath     Reactions occurred with 2nd and 3rd infusions (about 1-3 hours from initiation of infusion) and required treatment with steroids and antihistamines. Please refer to allergy note on 2/7/2023 for detailed description of her reactions. OBJECTIVE:    Physical Exam  Physical Exam  Vitals and nursing note reviewed. Constitutional:       General: She is not in acute distress. Appearance: She is ill-appearing. HENT:      Head: Normocephalic and atraumatic. Right Ear: External ear normal.      Left Ear: External ear normal.   Eyes:      General:         Right eye: No discharge. Left eye: No discharge. Cardiovascular:      Rate and Rhythm: Normal rate. Abdominal:      General: There is no distension. Palpations: Abdomen is soft. Tenderness: There is abdominal tenderness. There is no guarding. Comments: Ostomy noted   Skin:     Coloration: Skin is pale. Neurological:      General: No focal deficit present. Psychiatric:         Mood and Affect: Mood normal.         Behavior: Behavior normal.           Lab Results:   I have personally reviewed pertinent labs. , CBC:   Lab Results   Component Value Date    WBC 11.12 (H) 08/19/2023    HGB 8.5 (L) 08/19/2023    HCT 25.8 (L) 08/19/2023    MCV 92 08/19/2023     08/19/2023    RBC 2.82 (L) 08/19/2023    MCH 30.1 08/19/2023    MCHC 32.9 08/19/2023    RDW 15.0 08/19/2023    MPV 8.9 08/19/2023    NRBC 0 08/19/2023   , CMP:   Lab Results   Component Value Date    SODIUM 141 08/19/2023    K 3.5 08/19/2023     (H) 08/19/2023    CO2 28 08/19/2023    BUN 11 08/19/2023    CREATININE 0.72 08/19/2023    CALCIUM 8.6 08/19/2023    EGFR 94 08/19/2023   , PT/PTT:No results found for: "PT", "PTT"  Imaging Studies: Reviewed  EKG, Pathology, and Other Studies: Reviewed    Counseling / Coordination of Care    Total floor / unit time spent today 25+ minutes. Greater than 50% of total time was spent with the patient and / or family counseling and / or coordination of care.  A description of the counseling / coordination of care: Chart review, medication review, medication adjustments, supportive listening, discussion with primary    Portions of this document may have been created using dictation software and as such some "sound alike" terms may have been generated by the system. Do not hesitate to contact me with any questions or clarifications.

## 2023-08-19 NOTE — PLAN OF CARE
Problem: PAIN - ADULT  Goal: Verbalizes/displays adequate comfort level or baseline comfort level  Description: Interventions:  - Encourage patient to monitor pain and request assistance  - Assess pain using appropriate pain scale  - Administer analgesics based on type and severity of pain and evaluate response  - Implement non-pharmacological measures as appropriate and evaluate response  - Consider cultural and social influences on pain and pain management  - Notify physician/advanced practitioner if interventions unsuccessful or patient reports new pain  Outcome: Progressing     Problem: INFECTION - ADULT  Goal: Absence or prevention of progression during hospitalization  Description: INTERVENTIONS:  - Assess and monitor for signs and symptoms of infection  - Monitor lab/diagnostic results  - Monitor all insertion sites, i.e. indwelling lines, tubes, and drains  - Monitor endotracheal if appropriate and nasal secretions for changes in amount and color  - [place] appropriate cooling/warming therapies per order  - Administer medications as ordered  - Instruct and encourage patient and family to use good hand hygiene technique  - Identify and instruct in appropriate isolation precautions for identified infection/condition  Outcome: Progressing  Goal: Absence of fever/infection during neutropenic period  Description: INTERVENTIONS:  - Monitor WBC    Outcome: Progressing     Problem: SAFETY ADULT  Goal: Patient will remain free of falls  Description: INTERVENTIONS:  - Educate patient/family on patient safety including physical limitations  - Instruct patient to call for assistance with activity   - Consult OT/PT to assist with strengthening/mobility   - Keep Call bell within reach  - Keep bed low and locked with side rails adjusted as appropriate  - Keep care items and personal belongings within reach  - Initiate and maintain comfort rounds  - Make Fall Risk Sign visible to staff  Problem: Knowledge Deficit  Goal: Patient/family/caregiver demonstrates understanding of disease process, treatment plan, medications, and discharge instructions  Description: Complete learning assessment and assess knowledge base. Interventions:  - Provide teaching at level of understanding  - Provide teaching via preferred learning methods  Outcome: Progressing     - Apply yellow socks and bracelet for high fall risk patients  - Consider moving patient to room near nurses station  Outcome: Progressing  Goal: Maintain or return to baseline ADL function  Description: INTERVENTIONS:  -  Assess patient's ability to carry out ADLs; assess patient's baseline for ADL function and identify physical deficits which impact ability to perform ADLs (bathing, care of mouth/teeth, toileting, grooming, dressing, etc.)  - Assess/evaluate cause of self-care deficits   - Assess range of motion  - Assess patient's mobility; develop plan if impaired  - Assess patient's need for assistive devices and provide as appropriate  - Encourage maximum independence but intervene and supervise when necessary  - Involve family in performance of ADLs  - Assess for home care needs following discharge   - Consider OT consult to assist with ADL evaluation and planning for discharge  - Provide patient education as appropriate  Outcome: Progressing  Goal: Maintains/Returns to pre admission functional level  Description: INTERVENTIONS:  - Perform BMAT or MOVE assessment daily.   - Set and communicate daily mobility goal to care team and patient/family/caregiver.    - Collaborate with rehabilitation services on mobility goals if consulted  - Record patient progress and toleration of activity level   Outcome: Progressing     Problem: DISCHARGE PLANNING  Goal: Discharge to home or other facility with appropriate resources  Description: INTERVENTIONS:  - Identify barriers to discharge w/patient and caregiver  - Arrange for needed discharge resources and transportation as appropriate  - Identify discharge learning needs (meds, wound care, etc.)  - Arrange for interpretive services to assist at discharge as needed  - Refer to Case Management Department for coordinating discharge planning if the patient needs post-hospital services based on physician/advanced practitioner order or complex needs related to functional status, cognitive ability, or social support system  Outcome: Progressing

## 2023-08-20 ENCOUNTER — HOME HEALTH ADMISSION (OUTPATIENT)
Dept: HOME HEALTH SERVICES | Facility: HOME HEALTHCARE | Age: 58
End: 2023-08-20
Payer: COMMERCIAL

## 2023-08-20 PROBLEM — K52.1 DRUG-INDUCED DIARRHEA: Status: RESOLVED | Noted: 2023-06-21 | Resolved: 2023-08-20

## 2023-08-20 LAB
DME PARACHUTE DELIVERY DATE REQUESTED: NORMAL
DME PARACHUTE ITEM DESCRIPTION: NORMAL
DME PARACHUTE ORDER STATUS: NORMAL
DME PARACHUTE SUPPLIER NAME: NORMAL
DME PARACHUTE SUPPLIER PHONE: NORMAL
GLUCOSE SERPL-MCNC: 111 MG/DL (ref 65–140)
GLUCOSE SERPL-MCNC: 162 MG/DL (ref 65–140)
GLUCOSE SERPL-MCNC: 210 MG/DL (ref 65–140)
GLUCOSE SERPL-MCNC: 270 MG/DL (ref 65–140)

## 2023-08-20 PROCEDURE — 99232 SBSQ HOSP IP/OBS MODERATE 35: CPT | Performed by: INTERNAL MEDICINE

## 2023-08-20 PROCEDURE — 99024 POSTOP FOLLOW-UP VISIT: CPT | Performed by: COLON & RECTAL SURGERY

## 2023-08-20 PROCEDURE — 82948 REAGENT STRIP/BLOOD GLUCOSE: CPT

## 2023-08-20 RX ORDER — MORPHINE SULFATE 15 MG/1
7.5 TABLET ORAL
Status: DISCONTINUED | OUTPATIENT
Start: 2023-08-20 | End: 2023-08-21 | Stop reason: HOSPADM

## 2023-08-20 RX ORDER — MORPHINE SULFATE 15 MG/1
15 TABLET ORAL EVERY 6 HOURS
Status: DISCONTINUED | OUTPATIENT
Start: 2023-08-20 | End: 2023-08-21 | Stop reason: HOSPADM

## 2023-08-20 RX ADMIN — GABAPENTIN 200 MG: 100 CAPSULE ORAL at 07:38

## 2023-08-20 RX ADMIN — ACETAMINOPHEN 975 MG: 325 TABLET, FILM COATED ORAL at 21:49

## 2023-08-20 RX ADMIN — MORPHINE SULFATE 3 MG: 4 INJECTION, SOLUTION INTRAMUSCULAR; INTRAVENOUS at 04:06

## 2023-08-20 RX ADMIN — MORPHINE SULFATE 15 MG: 15 TABLET ORAL at 22:05

## 2023-08-20 RX ADMIN — HEPARIN SODIUM 5000 UNITS: 5000 INJECTION INTRAVENOUS; SUBCUTANEOUS at 21:52

## 2023-08-20 RX ADMIN — HEPARIN SODIUM 5000 UNITS: 5000 INJECTION INTRAVENOUS; SUBCUTANEOUS at 06:01

## 2023-08-20 RX ADMIN — INSULIN LISPRO 2 UNITS: 100 INJECTION, SOLUTION INTRAVENOUS; SUBCUTANEOUS at 11:20

## 2023-08-20 RX ADMIN — URSODIOL 900 MG: 300 CAPSULE ORAL at 08:25

## 2023-08-20 RX ADMIN — INSULIN LISPRO 1 UNITS: 100 INJECTION, SOLUTION INTRAVENOUS; SUBCUTANEOUS at 21:52

## 2023-08-20 RX ADMIN — AMLODIPINE BESYLATE 20 MG: 10 TABLET ORAL at 07:38

## 2023-08-20 RX ADMIN — GABAPENTIN 200 MG: 100 CAPSULE ORAL at 21:50

## 2023-08-20 RX ADMIN — PREDNISONE 20 MG: 20 TABLET ORAL at 07:38

## 2023-08-20 RX ADMIN — MORPHINE SULFATE 15 MG: 15 TABLET ORAL at 17:22

## 2023-08-20 RX ADMIN — MORPHINE SULFATE 15 MG: 15 TABLET ORAL at 11:17

## 2023-08-20 RX ADMIN — METHOCARBAMOL 500 MG: 500 TABLET ORAL at 21:49

## 2023-08-20 RX ADMIN — MORPHINE SULFATE 2 MG: 2 INJECTION, SOLUTION INTRAMUSCULAR; INTRAVENOUS at 13:59

## 2023-08-20 RX ADMIN — GABAPENTIN 200 MG: 100 CAPSULE ORAL at 17:21

## 2023-08-20 RX ADMIN — METHOCARBAMOL 500 MG: 500 TABLET ORAL at 13:59

## 2023-08-20 RX ADMIN — MORPHINE SULFATE 3 MG: 4 INJECTION, SOLUTION INTRAMUSCULAR; INTRAVENOUS at 07:38

## 2023-08-20 RX ADMIN — ACETAMINOPHEN 975 MG: 325 TABLET, FILM COATED ORAL at 14:02

## 2023-08-20 RX ADMIN — METHOCARBAMOL 500 MG: 500 TABLET ORAL at 06:01

## 2023-08-20 RX ADMIN — HEPARIN SODIUM 5000 UNITS: 5000 INJECTION INTRAVENOUS; SUBCUTANEOUS at 13:59

## 2023-08-20 RX ADMIN — INSULIN LISPRO 3 UNITS: 100 INJECTION, SOLUTION INTRAVENOUS; SUBCUTANEOUS at 17:22

## 2023-08-20 RX ADMIN — ACETAMINOPHEN 975 MG: 325 TABLET, FILM COATED ORAL at 06:01

## 2023-08-20 NOTE — PROGRESS NOTES
Progress note - Palliative and Supportive Care   Eliza Pedro 56 y.o. female [id number]    Patient Active Problem List   Diagnosis   • Malignant neoplasm of sigmoid colon [facility])   • History of Clostridium difficile colitis   • Other hemochromatosis   • Hypertension   • Sarcoidosis   • Impaired fasting glucose   • Hypokalemia   • Transaminitis   • R flank pain with bilateral hydronephrosis   • Adrenal nodule (HCC)   • Bleeding from colostomy stoma [facility])   • Other specified anemias   • Thrombocytopenia (HCC)   • Nonrheumatic mitral valve regurgitation   • Advance directive discussed with patient   • Gross hematuria   • Skin lesion   • Abnormal CT of the chest   • Kidney calculi   • Dysuria   • Chest pain   • Abnormal TSH   • Port-A-Cath in place   • Drug-induced constipation   • Palliative care patient   • Cancer related pain   • Drug-induced diarrhea   • Rectal cancer (HCC)   • Preop examination   • Herpes zoster without complication     Active issues specifically addressed today include:   Rectal cancer status post neoadjuvant chemoradiation, postop day 0 for diagnostic laparoscopy with loop colostomy reversal and diverting loop ileostomy  Cancer associated pain  Recent herpes zoster  Sarcoidosis  Palliative care patient    Plan:  1. Symptom management -    -D/C ATC IV morphine   -Start MSIR 15 mg q6H ATC with hold parameters   -Change PRN medication to:    MSIR 7.5 mg q3H PRN for severe pain and LINK to IV morphine 2 mg q3H PRN for BT pain   -gabapentin 200 mg every 8 hours --> consider up-titration tomorrow              -Continue adjuncts               -Will defer bowel regimen to colorectal surgery     2. Goals -continue full cares no limits. Patient's main goal is for improved pain control.   -    3. Psychosocial support-time spent providing supportive listening, last social work team to follow-up as well.   -    4.  [facility] [id number] will continue to follow, please notify on-call provider with any concerns about pain control. Code Status: Full- Level 1   Decisional apparatus:  Patient is competent on my exam today. If competence is lost, patient's substitute decision maker would default to N/A [place] 169. Advance Directive / Living Will / POLST: No    Interval history:       Patient resting comfortably. States she had a mild pain crisis earlier this AM. Is worried about pain control at home. Discussed need to try oral opioids and although, she is hesitant, she is agreeable. MEDICATIONS / ALLERGIES:     all current active meds have been reviewed and current meds:   Current Facility-Administered Medications   Medication Dose Route Frequency   • acetaminophen (TYLENOL) tablet 975 mg  975 mg Oral Q8H [facility]   • amLODIPine (NORVASC) tablet 20 mg  20 mg Oral Daily   • gabapentin (NEURONTIN) capsule 200 mg  200 mg Oral TID   • heparin (porcine) subcutaneous injection 5,000 Units  5,000 Units Subcutaneous Q8H [facility]   • insulin lispro (HumaLOG) 100 units/mL subcutaneous injection 1-5 Units  1-5 Units Subcutaneous TID AC   • insulin lispro (HumaLOG) 100 units/mL subcutaneous injection 1-5 Units  1-5 Units Subcutaneous HS   • methocarbamol (ROBAXIN) tablet 500 mg  500 mg Oral Q8H [facility]   • morphine injection 2 mg  2 mg Intravenous Q2H PRN   • morphine injection 3 mg  3 mg Intravenous Q4H   • ondansetron (ZOFRAN) injection 4 mg  4 mg Intravenous Q6H PRN   • oxybutynin (DITROPAN) tablet 5 mg  5 mg Oral Q6H PRN   • predniSONE tablet 20 mg  20 mg Oral Daily   • ursodiol (ACTIGALL) capsule 900 mg  900 mg Oral Daily       Allergies   Allergen Reactions   • Oxaliplatin Shortness Of Breath     Reactions occurred with 2nd and 3rd infusions (about 1-3 hours from initiation of infusion) and required treatment with steroids and antihistamines. Please refer to allergy note on 2/7/2023 for detailed description of her reactions. OBJECTIVE:    Physical Exam  Physical Exam  Vitals and nursing note reviewed.    Constitutional: General: She is not in acute distress. Appearance: She is ill-appearing. HENT:      Head: Normocephalic and atraumatic. Right Ear: External ear normal.      Left Ear: External ear normal.   Eyes:      General:         Right eye: No discharge. Left eye: No discharge. Cardiovascular:      Rate and Rhythm: Normal rate. Abdominal:      General: There is no distension. Palpations: Abdomen is soft. Tenderness: There is abdominal tenderness. There is no guarding. Comments: Ostomy noted   Skin:     Coloration: Skin is pale. Neurological:      General: No focal deficit present. Psychiatric:         Mood and Affect: Mood normal.         Behavior: Behavior normal.           Lab Results:   I have personally reviewed pertinent labs. , CBC:   No results found for: "WBC", "HGB", "HCT", "MCV", "PLT", "ADJUSTEDWBC", "RBC", "MCH", "MCHC", "RDW", "MPV", "NRBC", CMP:   No results found for: "SODIUM", "K", "CL", "CO2", "ANIONGAP", "BUN", "CREATININE", "GLUCOSE", "CALCIUM", "AST", "ALT", "ALKPHOS", "PROT", "BILITOT", "EGFR", PT/PTT:No results found for: "PT", "PTT"  Imaging Studies: Reviewed  EKG, Pathology, and Other Studies: Reviewed    Counseling / Coordination of Care    Total floor / unit time spent today 25+ minutes. Greater than 50% of total time was spent with the patient and / or family counseling and / or coordination of care. A description of the counseling / coordination of care: Chart review, medication review, medication adjustments, supportive listening, discussion with primary    Portions of this document may have been created using dictation software and as such some "sound alike" terms may have been generated by the system. Do not hesitate to contact me with any questions or clarifications.

## 2023-08-20 NOTE — DISCHARGE INSTR - AVS FIRST PAGE
[address] Surgical Discharge Instructions  Surgeon: Dr. Shane Barrios    Please follow-up as scheduled. If you do not already have a follow-up appointment in 3-4 weeks. Lovenox 40mg injections daily until 9/14/2023    Activity:  - No lifting, pushing, or pulling anything over 15 pounds for 4-6 weeks or until cleared by your physician  - Regular activity (working around the house, walking up/down stairs, etc.) is ok and encouraged  - No driving until you are no longer using narcotic pain medications    Diet:    - You may resume your normal diet    Wound Care:  - You may remove your dressing and shower 24hrs after your procedure  - When you shower, allow soapy water to run over your incisions and then pat dry. Do NOT rub or scrub your incisions  - Do not submerge your incisions in tubs, baths, pools, etc until cleared by your surgeon  - Indu Carmichael not apply any lotions, creams, or ointments to your incision until cleared by your surgeon  - Hernandez Jack may use daily dressing changes as needed (to prevent catching on your clothing or to absorb any drainage) with a dry gauze dressing held in place with a small piece of tape    Medications:    - Continue your pre-hospital medication regimen after discharge unless your were instructed otherwise. Please refer to your discharge medication list for further details. - For the first few days following your procedure, take Tylenol to provide a solid baseline pain control and use the stronger pain medications (if provided) for more severe pain  - Pain medications can cause constipation.  If you are unable to have a bowel movement for more than 1-2 days, take an over the counter stool softener or laxative such as Milk of Magnesia, Colace, or Senna     Additional Instructions:  - If you have any questions or concerns after discharge please do not hesitate to contact our office, we would be happy to provide clarification      Call our office or return to the Emergency Room if you develop a fever greater than 101F, chills, persistent nausea/vomiting, worsening/uncontrollable pain, and/or increasing redness or purulent/foul smelling drainage from your incision(s)

## 2023-08-20 NOTE — PROGRESS NOTES
-- Patient: Eliza Pedro  -- MRN: [id number]  -- Aidin Request ID: [id number]  -- Level of care reserved: Katherine Peterson  -- Partner Reserved: [facility], VALERIE,  [facility] [phone]  -- Clinical needs requested:  -- Geography searched: [phone]  -- Start of Service:  -- Request sent: 11:54am EDT on 8/18/2023 by Hubert Romero  -- Partner reserved: 9:52am EDT on 8/20/2023 by Hubert Romero  -- Choice list shared: 9:52am EDT on 8/20/2023 by Hubert Romero

## 2023-08-20 NOTE — PLAN OF CARE
Problem: GASTROINTESTINAL - ADULT  Goal: Establish and maintain optimal ostomy function  Description: INTERVENTIONS:  - Assess bowel function  - Encourage oral fluids to ensure adequate hydration  - Administer IV fluids if ordered to ensure adequate hydration   - Administer ordered medications as needed  - Encourage mobilization and activity  - Nutrition services referral to assist patient with appropriate food choices  - Assess stoma site  - Consider wound care consult   Outcome: Progressing     Problem: SAFETY ADULT  Goal: Patient will remain free of falls  Description: INTERVENTIONS:  - Educate patient/family on patient safety including physical limitations  - Instruct patient to call for assistance with activity   - Consult OT/PT to assist with strengthening/mobility   - Keep Call bell within reach  - Keep bed low and locked with side rails adjusted as appropriate  - Keep care items and personal belongings within reach  - Initiate and maintain comfort rounds  - Make Fall Risk Sign visible to staff  - Offer Toileting every 4 Hours, in advance of need  - Apply yellow socks and bracelet for high fall risk patients  - Consider moving patient to room near nurses station  Outcome: Progressing     Problem: PAIN - ADULT  Goal: Verbalizes/displays adequate comfort level or baseline comfort level  Description: Interventions:  - Encourage patient to monitor pain and request assistance  - Assess pain using appropriate pain scale  - Administer analgesics based on type and severity of pain and evaluate response  - Implement non-pharmacological measures as appropriate and evaluate response  - Consider cultural and social influences on pain and pain management  - Notify physician/advanced practitioner if interventions unsuccessful or patient reports new pain  Outcome: Progressing     Problem: INFECTION - ADULT  Goal: Absence or prevention of progression during hospitalization  Description: INTERVENTIONS:  - Assess and monitor for signs and symptoms of infection  - Monitor lab/diagnostic results  - Monitor all insertion sites, i.e. indwelling lines, tubes, and drains  - [place] appropriate cooling/warming therapies per order  - Administer medications as ordered  - Instruct and encourage patient and family to use good hand hygiene technique  - Identify and instruct in appropriate isolation precautions for identified infection/condition  Outcome: Progressing

## 2023-08-20 NOTE — PLAN OF CARE
Problem: PAIN - ADULT  Goal: Verbalizes/displays adequate comfort level or baseline comfort level  Description: Interventions:  - Encourage patient to monitor pain and request assistance  - Assess pain using appropriate pain scale  - Administer analgesics based on type and severity of pain and evaluate response  - Implement non-pharmacological measures as appropriate and evaluate response  - Consider cultural and social influences on pain and pain management  - Notify physician/advanced practitioner if interventions unsuccessful or patient reports new pain  Outcome: Progressing     Problem: INFECTION - ADULT  Goal: Absence or prevention of progression during hospitalization  Description: INTERVENTIONS:  - Assess and monitor for signs and symptoms of infection  - Monitor lab/diagnostic results  - Monitor all insertion sites, i.e. indwelling lines, tubes, and drains  - [place] appropriate cooling/warming therapies per order  - Administer medications as ordered  - Instruct and encourage patient and family to use good hand hygiene technique  - Identify and instruct in appropriate isolation precautions for identified infection/condition  Outcome: Progressing     Problem: SAFETY ADULT  Goal: Patient will remain free of falls  Description: INTERVENTIONS:  - Educate patient/family on patient safety including physical limitations  - Instruct patient to call for assistance with activity   - Consult OT/PT to assist with strengthening/mobility   - Keep Call bell within reach  - Keep bed low and locked with side rails adjusted as appropriate  - Keep care items and personal belongings within reach  - Initiate and maintain comfort rounds  - Make Fall Risk Sign visible to staff  - Offer Toileting every 4 Hours, in advance of need  - Apply yellow socks and bracelet for high fall risk patients  - Consider moving patient to room near nurses station  Outcome: Progressing     Problem: DISCHARGE PLANNING  Goal: Discharge to home or other facility with appropriate resources  Description: INTERVENTIONS:  - Identify barriers to discharge w/patient and caregiver  - Arrange for needed discharge resources and transportation as appropriate  - Identify discharge learning needs (meds, wound care, etc.)  - Arrange for interpretive services to assist at discharge as needed  - Refer to Case Management Department for coordinating discharge planning if the patient needs post-hospital services based on physician/advanced practitioner order or complex needs related to functional status, cognitive ability, or social support system  Outcome: Progressing     Problem: Knowledge Deficit  Goal: Patient/family/caregiver demonstrates understanding of disease process, treatment plan, medications, and discharge instructions  Description: Complete learning assessment and assess knowledge base.   Interventions:  - Provide teaching at level of understanding  - Provide teaching via preferred learning methods  Outcome: Progressing     Problem: GASTROINTESTINAL - ADULT  Goal: Establish and maintain optimal ostomy function  Description: INTERVENTIONS:  - Assess bowel function  - Encourage oral fluids to ensure adequate hydration  - Administer IV fluids if ordered to ensure adequate hydration   - Administer ordered medications as needed  - Encourage mobilization and activity  - Nutrition services referral to assist patient with appropriate food choices  - Assess stoma site  - Consider wound care consult   Outcome: Progressing

## 2023-08-20 NOTE — PROGRESS NOTES
Progress Note - Colorectal Surgery   Socrates Jha 56 y.o. female MRN: [id number]  Unit/Bed#: [facility] 809-01 Encounter: [id number]    Assessment:  53-year-old female with mid rectal cancer status post neoadjuvant chemoradiation, now status post robotic low anterior resection, reversal of diverting loop colostomy and diverting loop ileostomy on 8/17    AVSS on RA    UOP: 1.5 L  Ileostomy: 175 cc  NEPTALI: 230 cc ss    Labs pending    Lab Results   Component Value Date/Time    WBC 11.12 (H) 08/19/2023 04:55 AM    WBC 13.31 (H) 08/18/2023 05:24 AM    HGB 8.5 (L) 08/19/2023 04:55 AM    HGB 9.4 (L) 08/18/2023 05:24 AM    CREATININE 0.72 08/19/2023 04:55 AM    CREATININE 0.96 08/18/2023 05:24 AM       Plan:  - Low Res Diet  - Maintain NEPTALI drain  - Continue home prednisone 20 mg p.o.  - DVT prophylaxis  - Out of bed/ambulate  - PT: home with home health  - OT: no rehab needs  - Follow up CM discharge planning    Subjective/Objective     Subjective: No acute overnight events. Tolerating diet with nausea or vomiting. Denies fever, chills, shortness of breath. Objective:     Blood pressure 142/76, pulse 102, temperature 97.9 °F (36.6 °C), resp. rate 20, height 5' 2" (1.575 m), weight 70.3 kg (155 lb), SpO2 93 %. ,Body mass index is 28.35 kg/m².       Intake/Output Summary (Last 24 hours) at 8/20/2023 0641  Last data filed at 8/20/2023 0601  Gross per 24 hour   Intake 1470 ml   Output 1905 ml   Net -435 ml       Invasive Devices     Central Venous Catheter Line  Duration           Port A Cath 03/21/23 Right Internal jugular 151 days          Peripheral Intravenous Line  Duration           Peripheral IV 08/17/23 Right Arm 2 days          Drain  Duration           Ureteral Internal Stent Left ureter 6 Fr. 29 days    Ureteral Internal Stent Right ureter 7 Fr. 29 days    Closed/Suction Drain Right RLQ Bulb 19 Fr. 2 days    Ileostomy Loop RLQ 2 days    External Urinary Catheter 1 day              Physical Exam:   Gen: NAD, Resting in bed  Neuro: A&O, No focal deficits  Head: Normal Cephalic, Atraumatic  Eye: EOMI, No scleral icterus  CV: Regular rate  Pulm: Normal work of breathing, No respiratory distress  Abd: Soft, Tender globally, nondistended  Ext: No edema bilateral lower extremities, Non-tender  Skin: Warm, Dry, Intact  Incisions intact. Left ostomy site draining serous. Right NEPTALI in place.      ---  Em Luong MD  General Surgery PGY-I

## 2023-08-20 NOTE — CASE MANAGEMENT
Case Management Discharge Planning Note    Patient name Amy Leos  Location [address] 8[address]/[facility] 073-49 MRN [id number]  : 1965 Date 2023       Current Admission Date: 2023  Current Admission Diagnosis:Rectal cancer [facility])   Patient Active Problem List    Diagnosis Date Noted   • Herpes zoster without complication 41/15/2396   • Preop examination 2023   • Rectal cancer ([address]) 2023   • Drug-induced diarrhea 2023   • Drug-induced constipation 2023   • Palliative care patient 2023   • Cancer related pain 2023   • Port-A-Cath in place 2023   • Abnormal TSH 03/15/2023   • Dysuria 2023   • Chest pain 2023   • Abnormal CT of the chest 2023   • Kidney calculi    • Other specified anemias 02/15/2023   • Thrombocytopenia ([address]) 02/15/2023   • Nonrheumatic mitral valve regurgitation 02/15/2023   • Advance directive discussed with patient 02/15/2023   • Gross hematuria 02/15/2023   • Skin lesion 02/15/2023   • Bleeding from colostomy stoma ([address]) 2023   • Adrenal nodule ([address]) 2023   • Malignant neoplasm of sigmoid colon ([address]) 2022   • History of Clostridium difficile colitis 2022   • Other hemochromatosis 2022   • Hypertension 2022   • Sarcoidosis 2022   • Impaired fasting glucose 2022   • Hypokalemia 2022   • Transaminitis 2022   • R flank pain with bilateral hydronephrosis 2022      LOS (days): 3  Geometric Mean LOS (GMLOS) (days): 3.00  Days to GMLOS:0     OBJECTIVE:  Risk of Unplanned Readmission Score: 24.62         Current admission status: Inpatient   Preferred Pharmacy:   [facility] [address] [place] [address], [address]  [address] [facility], [facility] [address]  Phone: [phone] Fax: [phone] [facility] [place]) [place], [place] - [address]  [facility] [address] [facility] [address]  Phone: [phone] Fax: [phone] [address] [facility] [address] [facility][note 3] [address] [facility] [address] [facility] 1101 [facility] [address]  Phone: [phone] Fax: [phone]    Primary Care Provider: Alessandra Velasco MD    Primary Insurance: Nhi Earnest  Secondary Insurance:     DISCHARGE DETAILS:                                Requested [address] Discipline requested[de-identified] Nursing, Physical Therapy  Homebound Criteria Met[de-identified] Requires the Assistance of Another Person for Safe Ambulation or to Leave the Home, Uses an Assist Device (i.e. cane, walker, etc)  Supporting Clincal Findings[de-identified] Limited Endurance, Fatigues Easliy in [employer]    DME Referral Provided  Referral made for DME?: Yes  DME referral completed for the following items[de-identified] Etelvina Mcmullen  DME Supplier Name[de-identified] [employer]                       Additional Comments: Patient expected to be discharged later today or tomorrow, [facility] arranged for nursing/PT. Rolling walker ordered through [employer] and delivered bedside. Family to provide d/c transportation.

## 2023-08-21 VITALS
TEMPERATURE: 97.9 F | HEART RATE: 93 BPM | SYSTOLIC BLOOD PRESSURE: 118 MMHG | BODY MASS INDEX: 28.52 KG/M2 | OXYGEN SATURATION: 88 % | HEIGHT: 62 IN | WEIGHT: 155 LBS | DIASTOLIC BLOOD PRESSURE: 66 MMHG | RESPIRATION RATE: 16 BRPM

## 2023-08-21 LAB
GLUCOSE SERPL-MCNC: 111 MG/DL (ref 65–140)
GLUCOSE SERPL-MCNC: 164 MG/DL (ref 65–140)

## 2023-08-21 PROCEDURE — 99024 POSTOP FOLLOW-UP VISIT: CPT | Performed by: COLON & RECTAL SURGERY

## 2023-08-21 PROCEDURE — 99233 SBSQ HOSP IP/OBS HIGH 50: CPT | Performed by: INTERNAL MEDICINE

## 2023-08-21 PROCEDURE — NC001 PR NO CHARGE: Performed by: COLON & RECTAL SURGERY

## 2023-08-21 PROCEDURE — 82948 REAGENT STRIP/BLOOD GLUCOSE: CPT

## 2023-08-21 RX ORDER — MORPHINE SULFATE 15 MG/1
7.5-15 TABLET ORAL EVERY 4 HOURS PRN
Qty: 90 TABLET | Refills: 0 | Status: SHIPPED | OUTPATIENT
Start: 2023-08-21 | End: 2023-09-05

## 2023-08-21 RX ORDER — METHOCARBAMOL 500 MG/1
500 TABLET, FILM COATED ORAL EVERY 8 HOURS SCHEDULED
Qty: 15 TABLET | Refills: 0 | Status: SHIPPED | OUTPATIENT
Start: 2023-08-21 | End: 2023-08-26

## 2023-08-21 RX ORDER — ENOXAPARIN SODIUM 100 MG/ML
40 INJECTION SUBCUTANEOUS
Status: DISCONTINUED | OUTPATIENT
Start: 2023-08-21 | End: 2023-08-21 | Stop reason: HOSPADM

## 2023-08-21 RX ORDER — ENOXAPARIN SODIUM 100 MG/ML
40 INJECTION SUBCUTANEOUS
Qty: 9.6 ML | Refills: 0 | Status: SHIPPED | OUTPATIENT
Start: 2023-08-21 | End: 2023-09-14

## 2023-08-21 RX ADMIN — MORPHINE SULFATE 15 MG: 15 TABLET ORAL at 05:10

## 2023-08-21 RX ADMIN — METHOCARBAMOL 500 MG: 500 TABLET ORAL at 05:10

## 2023-08-21 RX ADMIN — ACETAMINOPHEN 975 MG: 325 TABLET, FILM COATED ORAL at 05:09

## 2023-08-21 RX ADMIN — HEPARIN SODIUM 5000 UNITS: 5000 INJECTION INTRAVENOUS; SUBCUTANEOUS at 05:11

## 2023-08-21 RX ADMIN — URSODIOL 900 MG: 300 CAPSULE ORAL at 08:32

## 2023-08-21 RX ADMIN — MORPHINE SULFATE 15 MG: 15 TABLET ORAL at 11:58

## 2023-08-21 RX ADMIN — MORPHINE SULFATE 2 MG: 2 INJECTION, SOLUTION INTRAMUSCULAR; INTRAVENOUS at 03:25

## 2023-08-21 RX ADMIN — GABAPENTIN 200 MG: 100 CAPSULE ORAL at 08:33

## 2023-08-21 RX ADMIN — ENOXAPARIN SODIUM 40 MG: 40 INJECTION SUBCUTANEOUS at 11:58

## 2023-08-21 RX ADMIN — INSULIN LISPRO 1 UNITS: 100 INJECTION, SOLUTION INTRAVENOUS; SUBCUTANEOUS at 12:00

## 2023-08-21 RX ADMIN — AMLODIPINE BESYLATE 20 MG: 10 TABLET ORAL at 08:33

## 2023-08-21 RX ADMIN — PREDNISONE 20 MG: 20 TABLET ORAL at 08:33

## 2023-08-21 NOTE — DISCHARGE INSTR - OTHER ORDERS
Ostomy Care:  -Stoma Measurement: 1.5 inches by 1.25 inches  inches (Measure stoma weekly for next 6-8 weeks- stoma will decrease in size due to decrease in swelling)     -Appliance Used During Bag Change:Convatec two piece moldable pouch    *Can shower with pouch on or off. Make sure to dry off pouch after shower. Bag Change Steps:  1. Peel back pouch using push-pull method, may use non-alcohol adhesive remover. Remove pouch from top to bottom. 2. Use warm water only to cleanse skin around the stoma (edmundo-stomal skin). 3. Make sure all adhesive residue is removed and skin is dry and not oily. 4. Measure stoma size using measuring guide and trace correct measurements onto back of pouch. 5. Then cut or mold the backing of pouch out to correct shape/size. 6. Place pouch over stoma and onto skin. 7. Use warmth of hand to apply gentle pressure to help backing of pouch to adhere well to skin. **If the skin is open, moist or fragile, Crusting can be done prior to pouch application (before step 6) to create dry skin and assist with pouch adherence- steps are listed below. TIPS:  Empty pouch when 1/3 -1/2 full. Change pouch every 4-5 days or if signs of leaking. Crusting as needed for moist, red, open skin around the stoma: (Done prior to pouch application)   Apply stoma powder to excoriation, move excess powder away with hand  Use no sting barrier to pat area to form "crust"  Repeat X2 before placing new ostomy pouch      Please call [facility]patient [facility] @ [phone] with any concerns, questions, or help needed.

## 2023-08-21 NOTE — QUICK NOTE
RLQ NEPTALI drain removed in standard fashion, without issue. Patient tolerated procedure. Gauze and tegederm applied.      ---  James Velázquez MD  General Surgery PGY-I

## 2023-08-21 NOTE — PLAN OF CARE
Problem: GASTROINTESTINAL - ADULT  Goal: Establish and maintain optimal ostomy function  Description: INTERVENTIONS:  - Assess bowel function  - Encourage oral fluids to ensure adequate hydration  - Administer IV fluids if ordered to ensure adequate hydration   - Administer ordered medications as needed  - Encourage mobilization and activity  - Nutrition services referral to assist patient with appropriate food choices  - Assess stoma site  - Consider wound care consult   Outcome: Progressing     Problem: SAFETY ADULT  Goal: Patient will remain free of falls  Description: INTERVENTIONS:  - Educate patient/family on patient safety including physical limitations  - Instruct patient to call for assistance with activity   - Consult OT/PT to assist with strengthening/mobility   - Keep Call bell within reach  - Keep bed low and locked with side rails adjusted as appropriate  - Keep care items and personal belongings within reach  - Initiate and maintain comfort rounds  - Make Fall Risk Sign visible to staff  - Offer Toileting every 4 Hours, in advance of need  - Apply yellow socks and bracelet for high fall risk patients  - Consider moving patient to room near nurses station  Outcome: Progressing     Problem: INFECTION - ADULT  Goal: Absence or prevention of progression during hospitalization  Description: INTERVENTIONS:  - Assess and monitor for signs and symptoms of infection  - Monitor lab/diagnostic results  - Monitor all insertion sites, i.e. indwelling lines, tubes, and drains  - [place] appropriate cooling/warming therapies per order  - Administer medications as ordered  - Instruct and encourage patient and family to use good hand hygiene technique  - Identify and instruct in appropriate isolation precautions for identified infection/condition  Outcome: Progressing     Problem: PAIN - ADULT  Goal: Verbalizes/displays adequate comfort level or baseline comfort level  Description: Interventions:  - Encourage patient to monitor pain and request assistance  - Assess pain using appropriate pain scale  - Administer analgesics based on type and severity of pain and evaluate response  - Implement non-pharmacological measures as appropriate and evaluate response  - Consider cultural and social influences on pain and pain management  - Notify physician/advanced practitioner if interventions unsuccessful or patient reports new pain  Outcome: Progressing

## 2023-08-21 NOTE — PROGRESS NOTES
Progress Note - Colorectal Surgery   Rema Persons 56 y.o. female MRN: [id number]  Unit/Bed#: [facility] 809-01 Encounter: [id number]    Assessment:  56-year-old female with mid rectal cancer status post neoadjuvant chemoradiation, now status post robotic low anterior resection, reversal of diverting loop colostomy and diverting loop ileostomy on 8/17    HR , Sat >89 on RA, vitals otherwise stable    UOP: 1 L recorded  Ileostomy: 100 cc  NEPTALI: 420 cc ss    No labs today    Lab Results   Component Value Date/Time    WBC 11.12 (H) 08/19/2023 04:55 AM    WBC 13.31 (H) 08/18/2023 05:24 AM    HGB 8.5 (L) 08/19/2023 04:55 AM    HGB 9.4 (L) 08/18/2023 05:24 AM    CREATININE 0.72 08/19/2023 04:55 AM    CREATININE 0.96 08/18/2023 05:24 AM       Plan:  - Low Res Diet  - Continue home prednisone 20 mg p.o.  - DVT prophylaxis  - Out of bed/ambulate  - PT: home with home health  - OT: no rehab needs  - Follow up with CM for discharge planning  - Pull NEPTALI drain on discharge    Subjective/Objective     Subjective: Reports pain in left abdomen since 3 am, intermittently. Tolerating diet without nausea or vomiting. Denies fever, chills, shortness of breath. Reports voiding, ambulating. Objective:     Blood pressure 131/72, pulse 95, temperature 99.2 °F (37.3 °C), resp. rate 17, height 5' 2" (1.575 m), weight 70.3 kg (155 lb), SpO2 90 %. ,Body mass index is 28.35 kg/m².       Intake/Output Summary (Last 24 hours) at 8/21/2023 0505  Last data filed at 8/21/2023 0301  Gross per 24 hour   Intake 620 ml   Output 1760 ml   Net -1140 ml       Invasive Devices     Central Venous Catheter Line  Duration           Port A Cath 03/21/23 Right Internal jugular 152 days          Peripheral Intravenous Line  Duration           Peripheral IV 08/17/23 Right Arm 3 days          Drain  Duration           Ureteral Internal Stent Left ureter 6 Fr. 30 days    Ureteral Internal Stent Right ureter 7 Fr. 30 days    Closed/Suction Drain Right RLQ Bulb 19 Fr. 3 days    Ileostomy Loop RLQ 3 days    External Urinary Catheter 2 days              Physical Exam:   Gen: NAD, Resting in bed  Neuro: A&O, No focal deficits  Head: Normal Cephalic, Atraumatic  Eye: EOMI, No scleral icterus  CV: Regular rate  Pulm: Normal work of breathing, No respiratory distress on RA  Abd: Soft, Tender globally to palpation, Mildly distended  Ext: Trace edema bilateral lower extremities, Non-tender  Skin: Warm, Dry, Intact  Left prev ostomy site draining serous; granulating. Right NEPTALI in place with ss output.     ---  Keely Butcher MD  General Surgery PGY-I

## 2023-08-21 NOTE — DISCHARGE INSTRUCTIONS
Low Fiber Diet   WHAT YOU NEED TO KNOW:   A low-fiber diet limits foods that are high in fiber. Fiber is the part of fruits, vegetables, and grains that is not broken down by your body. You may need to follow this diet after surgery on your intestines. You may also need to follow this diet for certain conditions such as Crohn disease or ulcerative colitis. Ask your healthcare provider or dietitian how much fiber you can have each day. DISCHARGE INSTRUCTIONS:   Foods to include:  Read food labels to check the amount of fiber that are found in foods. Some foods that are low in fiber include the following:  Grains:  Choose grains that have less than 2 grams of fiber in each serving. Examples include the following:    Cream of wheat and finely ground grits    Dry cereal made from rice    White bread, white pasta, and white rice    Crackers, bagels, and rolls made from white or refined flour    Other foods:      Canned and well-cooked fruit without skins or seeds, and juice without pulp    Ripe bananas and melons    Canned and well-cooked vegetables without skins or seeds, and vegetable juice    Cow's milk, lactose-free milk, soy milk, and rice milk    Yogurt without nuts, fruit, or granola    Eggs, poultry (such as chicken and turkey), fish, and tender, ground, well-cooked beef    Tofu and smooth peanut butter    Broth and strained soups made of low-fiber foods    Foods to avoid:   Breads, cereals, crackers, and pasta made with whole wheat or whole grains (such as whole oats)    [employer], wild rice, quinoa, kasha, and barley    All fresh fruit with skin, except banana and melons    Dried fruits and fruit juice with pulp    Canned pineapple    Raw vegetables    Nuts, seeds, and popcorn    Beans, nuts, peas, and lentils    Tough meats    Coconut and avocado    What else you should know about a low-fiber diet:  A low-fiber diet can decrease the amount of bowel movements you have.  Drink liquids as directed to avoid constipation. Ask how much liquid to drink each day and which liquids are best for you. © Copyright Percy Hiram 2022 Information is for End User's use only and may not be sold, redistributed or otherwise used for commercial purposes. The above information is an  only. It is not intended as medical advice for individual conditions or treatments. Talk to your doctor, nurse or pharmacist before following any medical regimen to see if it is safe and effective for you.

## 2023-08-21 NOTE — PROGRESS NOTES
Progress note - Palliative and Supportive Care   Odessa Varner 56 y.o. female [id number]    Patient Active Problem List   Diagnosis   • Malignant neoplasm of sigmoid colon [facility])   • History of Clostridium difficile colitis   • Other hemochromatosis   • Hypertension   • Sarcoidosis   • Impaired fasting glucose   • Hypokalemia   • Transaminitis   • R flank pain with bilateral hydronephrosis   • Adrenal nodule (HCC)   • Bleeding from colostomy stoma [facility])   • Other specified anemias   • Thrombocytopenia (HCC)   • Nonrheumatic mitral valve regurgitation   • Advance directive discussed with patient   • Gross hematuria   • Skin lesion   • Abnormal CT of the chest   • Kidney calculi   • Dysuria   • Chest pain   • Abnormal TSH   • Port-A-Cath in place   • Drug-induced constipation   • Palliative care patient   • Cancer related pain   • Rectal cancer (HCC)   • Preop examination   • Herpes zoster without complication     Active issues specifically addressed today include:   Rectal cancer s/p neoadjuvant chemo radiation and diagnostic laparoscopically with loop colostomy reversal and diverting loop ileostomy  Cancer associated pain  Recent herpes zoster  Sarcoidosis  Palliative care patient    Plan:  1. Symptom management  While inpatient, continue current regimen of Tylenol, MSIR, IV morphine  Morphine IR 7.5-15 mg every 4 hours as needed sent to patient's pharmacy  Continue gabapentin 200 mg 3 times daily in the outpatient setting-->Dr. Jonathan Alvarez can consider uptitration     2. Goals  Continue full cares without limits  Her main goal is improved pain control    3. Psychosocial & Spiritual  PSC SW will continue to follow    4. Follow-up  We will continue to follow inpatient and she will follow-up with Dr. Elen Pollock     Code Status: Full - Level 1   Decisional apparatus:  Patient is competent on my exam today. If competence is lost, patient's substitute decision maker would default to N/A by [place] Act 169.    Advance Directive / Living Will / POLST:  None on file    Interval history:       Overall, she is excited to go home, but is nervous about uncontrolled pain and what that could mean. She states that she had a little bit of left flank and left upper quadrant pain that felt similar to the sensation of a kidney stone. Over, she states that she takes each moment minute by minute and will go slow when she is home. I expressed that we would be there to support her and to listen to her got if she needs to come back to get evaluated in the hospital.  I also informed her of the outpatient prescription and that she has an appointment with Dr. Haven Araujo on the 30th. Otherwise, symptoms are well controlled. MEDICATIONS / ALLERGIES:     all current active meds have been reviewed, current meds:   Current Facility-Administered Medications   Medication Dose Route Frequency   • acetaminophen (TYLENOL) tablet 975 mg  975 mg Oral Q8H [facility]   • amLODIPine (NORVASC) tablet 20 mg  20 mg Oral Daily   • gabapentin (NEURONTIN) capsule 200 mg  200 mg Oral TID   • heparin (porcine) subcutaneous injection 5,000 Units  5,000 Units Subcutaneous Q8H [facility]   • insulin lispro (HumaLOG) 100 units/mL subcutaneous injection 1-5 Units  1-5 Units Subcutaneous TID AC   • insulin lispro (HumaLOG) 100 units/mL subcutaneous injection 1-5 Units  1-5 Units Subcutaneous HS   • methocarbamol (ROBAXIN) tablet 500 mg  500 mg Oral Q8H [facility]   • morphine (MSIR) IR tablet 15 mg  15 mg Oral Q6H   • morphine (MSIR) IR tablet 7.5 mg  7.5 mg Oral Q3H PRN    Or   • morphine injection 2 mg  2 mg Intravenous Q3H PRN   • ondansetron (ZOFRAN) injection 4 mg  4 mg Intravenous Q6H PRN   • oxybutynin (DITROPAN) tablet 5 mg  5 mg Oral Q6H PRN   • predniSONE tablet 20 mg  20 mg Oral Daily   • ursodiol (ACTIGALL) capsule 900 mg  900 mg Oral Daily    and PTA meds:   Prior to Admission Medications   Prescriptions Last Dose Informant Patient Reported?  Taking?   acetaminophen (TYLENOL) 500 mg tablet Unknown Self No No   Sig: Take 2 tablets (1,000 mg total) by mouth 3 (three) times a day   acyclovir (ZOVIRAX) 800 mg tablet   No No   Sig: Take 1 tablet (800 mg total) by mouth 5 (five) times a day for 7 days For shingles   acyclovir (ZOVIRAX) 800 mg tablet   No No   Sig: Take 1 tablet (800 mg total) by mouth 5 (five) times a day for 3 days   amLODIPine (NORVASC) 10 mg tablet 8/17/2023 at 0430 Self No Yes   Sig: Take 1 tablet (10 mg total) by mouth daily   Patient taking differently: Take 20 mg by mouth daily   cyanocobalamin (VITAMIN B-12) 1000 MCG tablet Past Month Self Yes Yes   Sig: Take 1,000 mcg by mouth daily   docusate sodium (COLACE) 100 mg capsule Past Month Self No Yes   Sig: Take 1 capsule (100 mg total) by mouth 2 (two) times a day for 15 days   docusate sodium (COLACE) 100 mg capsule   No Yes   Sig: Take 1 capsule (100 mg total) by mouth 2 (two) times a day for 15 days   gabapentin (Neurontin) 100 mg capsule   No No   Sig: Take 2 capsules (200 mg total) by mouth 2 (two) times a day To reduce shingles pain   oxyCODONE (Roxicodone) 5 immediate release tablet 8/16/2023 Self No Yes   Sig: Take 1-2 tablets (5-10 mg total) by mouth every 4 (four) hours as needed for moderate pain or severe pain Max Daily Amount: 60 mg   oxybutynin (DITROPAN) 5 mg tablet Past Month  No Yes   Sig: Take 1 tablet (5 mg total) by mouth every 6 (six) hours as needed (bladder spasms)   potassium chloride (MICRO-K) 10 MEQ CR capsule 8/14/2023  No Yes   Sig: Take 4 capsules (40 mEq total) by mouth 2 (two) times a day   predniSONE 10 mg tablet 8/17/2023 at 430 Self No Yes   Sig: Take 4 tablets (40 mg total) by mouth daily Please take for 4 weeks and then decrease by 5 mg increments every 2 weeks. Patient taking differently: Take 30 mg by mouth daily Please take for 4 weeks and then decrease by 5 mg increments every 2 weeks.  -- Taking 20mg and then 10mg - titrating   ursodiol (ACTIGALL) 300 mg capsule 8/16/2023 Self No Yes Sig: Take 3 capsules (900 mg total) by mouth in the morning      Facility-Administered Medications: None       Allergies   Allergen Reactions   • Oxaliplatin Shortness Of Breath     Reactions occurred with 2nd and 3rd infusions (about 1-3 hours from initiation of infusion) and required treatment with steroids and antihistamines. Please refer to allergy note on 2/7/2023 for detailed description of her reactions. OBJECTIVE:    Physical Exam  Physical Exam  Constitutional:       General: She is not in acute distress. Appearance: She is well-developed. She is not diaphoretic. HENT:      Head: Normocephalic and atraumatic. Right Ear: External ear normal.      Left Ear: External ear normal.      Mouth/Throat:      Mouth: Mucous membranes are moist.      Pharynx: Oropharynx is clear. Eyes:      General: No scleral icterus. Conjunctiva/sclera: Conjunctivae normal.   Neck:      Thyroid: No thyromegaly. Trachea: No tracheal deviation. Cardiovascular:      Rate and Rhythm: Normal rate and regular rhythm. Pulses: Normal pulses. Heart sounds: Normal heart sounds. Pulmonary:      Effort: Pulmonary effort is normal.   Abdominal:      General: There is no distension. Palpations: Abdomen is soft. Tenderness: There is abdominal tenderness (RUQ). Comments: Ostomy present with well healing surgical incisions   Musculoskeletal:      Cervical back: Neck supple. Right lower leg: No edema. Left lower leg: No edema. Lymphadenopathy:      Cervical: No cervical adenopathy. Skin:     General: Skin is warm. Capillary Refill: Capillary refill takes less than 2 seconds. Coloration: Skin is not pale. Findings: No erythema. Neurological:      Mental Status: She is alert and oriented to person, place, and time. Psychiatric:         Mood and Affect: Mood normal.         Behavior: Behavior normal.         Thought Content:  Thought content normal. Judgment: Judgment normal.         Lab Results: I have personally reviewed pertinent labs. , CBC: No results found for: "WBC", "HGB", "HCT", "MCV", "PLT", "ADJUSTEDWBC", "RBC", "MCH", "MCHC", "RDW", "MPV", "NRBC", CMP: No results found for: "SODIUM", "K", "CL", "CO2", "ANIONGAP", "BUN", "CREATININE", "GLUCOSE", "CALCIUM", "AST", "ALT", "ALKPHOS", "PROT", "BILITOT", "EGFR", BMP:No results found for: "SODIUM", "K", "CL", "CO2", "BUN", "CREATININE", "GLUC", "GLUF", "CALCIUM", "AGAP", "EGFR", PT/PTT:No results found for: "PT", "PTT"  Imaging Studies: Reviewed, none new  EKG, Pathology, and Other Studies: Reviewed, none new    Counseling / Coordination of Care    Total floor / unit time spent today 50 minutes. Greater than 50% of total time was spent with the patient and / or family counseling and / or coordination of care. A description of the counseling / coordination of care: Chart review, patient interview/discussion, psychosocial support, comprehensive symptom evaluation, physical examination, medication evaluation/alteration, advance care/goals-of-care planning, and multidisciplinary collaboration.         Jane Hayes DO  Hospice/Palliative Care Medicine PGY-4  [address] [facility]

## 2023-08-21 NOTE — WOUND OSTOMY CARE
Progress Note- Ostomy  Chidi Carr 56 y.o. female  [id number]  [facility] 809-01    Assessment:  Patient seen today for ostomy teaching. Patient previously had colostomy and now is s/p loop ileostomy on 8/17. Patient was able to remove previous pouch, cleanse edmundo-stomal skin, mold wafer of two piece appliance, apply pouch to skin, snap pouch onto wafer, and close the end of the pouch independently with no assistance from [facility] RN. Patient stated without prompting that she needs to chew food more thoroughly. Patient educated on: need for continued edmundo-stomal skin care, increasing fluids, difference between ileostomy and colosotomy, ability to continue to use convatec two piece moldable pouches. Patient post education and pouch change reports no additional questions or concerns. Skin prep and additional pouch supplied to patient's room. Patient reports that still has remaining pouches from colostomy. Stoma appearance:       Stoma: Red, oval, well budded stoma with proximal and distal os, peristomal skin is intact. Stoma attached to skin junction, no separation noted. Moderate amount of brown soft stool in bag. STOMA MEASUREMENT: 1.5 inches by 1.25 inches      Ostomy Care:  -Stoma Measurement: 1.5 inches by 1.25 inches  inches (Measure stoma weekly for next 6-8 weeks- stoma will decrease in size due to decrease in swelling)     -Appliance Used During Bag Change:Convatec two piece moldable pouch    *Can shower with pouch on or off. Make sure to dry off pouch after shower. Bag Change Steps:  1. Peel back pouch using push-pull method, may use non-alcohol adhesive remover. Remove pouch from top to bottom. 2. Use warm water only to cleanse skin around the stoma (edmundo-stomal skin). 3. Make sure all adhesive residue is removed and skin is dry and not oily. 4. Measure stoma size using measuring guide and trace correct measurements onto back of pouch.   5. Then cut or mold the backing of pouch out to correct shape/size. 6. Place pouch over stoma and onto skin. 7. Use warmth of hand to apply gentle pressure to help backing of pouch to adhere well to skin. **If the skin is open, moist or fragile, Crusting can be done prior to pouch application (before step 6) to create dry skin and assist with pouch adherence- steps are listed below. TIPS:  Empty pouch when 1/3 -1/2 full. Change pouch every 4-5 days or if signs of leaking. Crusting as needed for moist, red, open skin around the stoma: (Done prior to pouch application)   Apply stoma powder to excoriation, move excess powder away with hand  Use no sting barrier to pat area to form "crust"  Repeat X2 before placing new ostomy pouch        Orders listed below and wound care will continue to follow, call or tiger text with questions. Bedside nurse updated of findings and orders. Flowsheets below with pictures and measurements.       Rickey Orr RN, BSN, [employer]

## 2023-08-21 NOTE — DISCHARGE SUMMARY
Discharge Summary - Colorectal Surgery  Henry Sun 56 y.o. female MRN: [id number]  Unit/Bed#: [facility] 809-01 Encounter: [id number]    Admission Date:   Admission Orders (From admission, onward)     Ordered        08/17/23 1203  Inpatient Admission  Once                        Admitting Diagnosis: Rectal cancer ([address] ) [C20]    HPI: 55 yo female with known middle rectal adenocarcinoma, s/p diverting loop colostomy, chemoradiation. Presented 8/17 for possible laparoscopic vs open low anterior resection. Hx of hemachromatosis, HTN, sarcoidosis, seizures, renal stones. 6/15/23 CT CAP showed no definite metastatic disease, splenomegaly suggestive of sarcoidosis, colonic wall thickening of ascending colon, bilateral ureteral stent. Procedures Performed: Low anterior resection with robotics, left sided loop colostomy reversal, diverting loop ileostomy    Hospital Course: Underwent 8/17 low anterior resection with robotics, left sided loop colostomy reversal, diverting loop ileostomy with Dr. Alycia Riojas. Utilized PCA-d postoperatively temporarily, Palliative consulted for pain control as patient follows with them outpatient. Diet was advanced as tolerated. Dressings were kept clean and changed regularly. Patient was encouraged to ambulate and utilize incentive spirometry. Postoperatively worked with both PT and OT. On day of discharge, NEPTALI drain was removed without issue. Ileostomy productive, voiding, tolerating diet. Denied nausea, vomiting, fever, chills, shortness of breath. Vitals have been stable. Patient to follow up with Palliative and Colorectal Surgery outpatient. Complications: None    Discharge Diagnosis: s/p LAR for rectal cancer    Condition at Discharge: stable     Discharge instructions/Information to patient and family:   See after visit summary for information provided to patient and family.       Provisions for Follow-Up Care:  See after visit summary for information related to follow-up care and any pertinent home health orders. Disposition: Home with home health    Planned Readmission: No    Discharge Statement   I spent 25 minutes discharging the patient. This time was spent on the day of discharge. I had direct contact with the patient on the day of discharge. Additional documentation is required if more than 30 minutes were spent on discharge. Discharge Medications:  See after visit summary for reconciled discharge medications provided to patient and family.       ---  Teresa Jaicnto MD  General Surgery PGY-I

## 2023-08-22 ENCOUNTER — HOME CARE VISIT (OUTPATIENT)
Dept: HOME HEALTH SERVICES | Facility: HOME HEALTHCARE | Age: 58
End: 2023-08-22
Payer: COMMERCIAL

## 2023-08-22 ENCOUNTER — NURSE TRIAGE (OUTPATIENT)
Age: 58
End: 2023-08-22

## 2023-08-22 VITALS
RESPIRATION RATE: 12 BRPM | HEART RATE: 105 BPM | DIASTOLIC BLOOD PRESSURE: 72 MMHG | OXYGEN SATURATION: 93 % | TEMPERATURE: 99.4 F | SYSTOLIC BLOOD PRESSURE: 130 MMHG

## 2023-08-22 PROCEDURE — 10330064 SPONGE, GAUZE 8PLY N/S 4"X4" (200/PK 20P

## 2023-08-22 PROCEDURE — G0299 HHS/HOSPICE OF RN EA 15 MIN: HCPCS

## 2023-08-22 PROCEDURE — 10330064 PAD, ABD 5X9" STR LF (1/PK 20PK/BX) MGM1

## 2023-08-22 PROCEDURE — 400013 VN SOC

## 2023-08-22 NOTE — CASE COMMUNICATION
Pulse ox 88 to 92% laying down, Low grade temp 99.4, -110, and fatigue but no dizziness or chest discomfort. After sitting patient up pulse ox 93-94 % and -115. Patient reports pain makes it hard to breath. Patient reports zero appetite. Poor oral intake. Nausea and emesis this am. Unable to take any medications. Can patient have an antiemetic called into [facility].

## 2023-08-22 NOTE — UTILIZATION REVIEW
NOTIFICATION OF ADMISSION DISCHARGE   This is a Notification of Discharge from [facility]. Please be advised that this patient has been discharge from our facility. Below you will find the admission and discharge date and time including the patient’s disposition. UTILIZATION REVIEW CONTACT:  Sabino Smyth  Utilization   Network Utilization Review Department  Phone: [phone] x carefully listen to the prompts. All voicemails are confidential.  Email: [email or web address]     ADMISSION INFORMATION  PRESENTATION DATE: 8/17/2023  5:41 AM  OBERVATION ADMISSION DATE:  INPATIENT ADMISSION DATE: 8/17/23 12:04 PM   DISCHARGE DATE: 8/21/2023  1:18 PM   DISPOSITION:Home with Home Health Care    IMPORTANT INFORMATION:  Send all requests for admission clinical reviews, approved or denied determinations and any other requests to dedicated fax number below belonging to the campus where the patient is receiving treatment.  List of dedicated fax numbers:  [place] DENIALS (Administrative/Medical Necessity) [phone] [address] [facility] (Maternity/NICU/Pediatrics) [phone]   [facility] [phone]   [facility] [phone] [address] [facility] [phone]   [address] [place] [address] [phone]   [facility] [phone]   [address] [place] [address] [address] [place] [address] [phone]   [address] [place] [address] [phone]   [address] [facility] [phone] [address] [facility] [address] [facility] [phone]

## 2023-08-22 NOTE — TELEPHONE ENCOUNTER
Visiting nurse states that patient has copious amount of drainage from the right lower quadrant drain site. She does have guarding tenderness in the lower abdomen. He had to change the dressing within 1 hour the dressing was saturated and needed to be changed again. Wound measured  0.2 cm x 1.3 cm x 1.3 cm. She has no appetite and is not eating or drinking or taking meds. She is having output per the ileostomy.

## 2023-08-22 NOTE — CASE COMMUNICATION
Ship to - Pt.  Home     Insurance - [facility]    Wound 1 RLQ drain site      Full x           Gauze 2x2 N/S 200 2x2s per unit  [id number]

## 2023-08-22 NOTE — CASE COMMUNICATION
[place] has admitted your patient to [facility] service with the following disciplines:      SN and PT  Response needed, please respond via in basket or tiger connect with verbal order for potassium correction   Primary focus of home health care: GI  Patient stated goals of care: Ostomy gone. Improve mobility  Anticipated visit pattern: 2w3, 3 PRN for ostomy complicationsand next visit date: 8/24/23 S/P 8/17 low anterior resecti on with robotics, left sided loop colostomy reversal, diverting loop ileostomy  See medication list - meds in home differ from AVS: Patient is only taking Potassium 20 meq BID not 40 meq BID. Please provide verbal order to change in Epic. Significant clinical findings: Pulse ox 88 to 92% laying down, Low grade temp 99.4, -110, and fatigue but no dizziness or chest discomfort. After sitting patient up pulse ox 93-94 % and HR 11 0-115. Patient reports pain makes it hard to breath. Patient reports zero appetite. Poor oral intake. Nausea and emesis this am. Unable to take any medications. Fine crackles bilateral lower lobes. Transverse lower ABD is firm and has guarding tenderness. RLQ drain site has copious serosanguinous drainage. Patient would like a shower chair. Please call in a script with supporting documentation. Patient refuses to take gabapentin,  ok to DC? Thank you for allowing us to participate in the care of your patient.       [employer] Ariane Weathers RN

## 2023-08-22 NOTE — CASE COMMUNICATION
Ship to -  [address] CARITAS/[facility] CARITAS    Wound 1 RLQ drain site      Full x           Gauze 4x4 N/S 200 4x4s per unit  [id number] -1   ABD 5x9 [id number] -1

## 2023-08-22 NOTE — CASE COMMUNICATION
TC to Dr. Haven Reyes left message with Estee Escobedo to report copious drainage from drain site RLQ. And to notify of need to address admission note. TC to  to report need to address admission note left message with Dariana Nguyen.

## 2023-08-23 ENCOUNTER — APPOINTMENT (EMERGENCY)
Dept: RADIOLOGY | Facility: HOSPITAL | Age: 58
End: 2023-08-23
Payer: COMMERCIAL

## 2023-08-23 ENCOUNTER — TELEPHONE (OUTPATIENT)
Dept: INTERNAL MEDICINE CLINIC | Facility: CLINIC | Age: 58
End: 2023-08-23

## 2023-08-23 ENCOUNTER — HOSPITAL ENCOUNTER (INPATIENT)
Facility: HOSPITAL | Age: 58
LOS: 5 days | Discharge: HOME HEALTH | End: 2023-08-29
Attending: EMERGENCY MEDICINE | Admitting: COLON & RECTAL SURGERY
Payer: COMMERCIAL

## 2023-08-23 ENCOUNTER — TRANSITIONAL CARE MANAGEMENT (OUTPATIENT)
Dept: INTERNAL MEDICINE CLINIC | Facility: CLINIC | Age: 58
End: 2023-08-23

## 2023-08-23 DIAGNOSIS — K56.609 SMALL BOWEL OBSTRUCTION (HCC): Primary | ICD-10-CM

## 2023-08-23 LAB
ALBUMIN SERPL BCP-MCNC: 3.5 G/DL (ref 3.5–5)
ALP SERPL-CCNC: 346 U/L (ref 34–104)
ALT SERPL W P-5'-P-CCNC: 44 U/L (ref 7–52)
ANION GAP SERPL CALCULATED.3IONS-SCNC: 13 MMOL/L
ANISOCYTOSIS BLD QL SMEAR: PRESENT
APTT PPP: 31 SECONDS (ref 23–37)
AST SERPL W P-5'-P-CCNC: 40 U/L (ref 13–39)
ATRIAL RATE: 112 BPM
BACTERIA UR QL AUTO: ABNORMAL /HPF
BASOPHILS # BLD MANUAL: 0 THOUSAND/UL (ref 0–0.1)
BASOPHILS NFR MAR MANUAL: 0 % (ref 0–1)
BILIRUB SERPL-MCNC: 1.25 MG/DL (ref 0.2–1)
BILIRUB UR QL STRIP: NEGATIVE
BUN SERPL-MCNC: 17 MG/DL (ref 5–25)
CALCIUM SERPL-MCNC: 9.5 MG/DL (ref 8.4–10.2)
CHLORIDE SERPL-SCNC: 98 MMOL/L (ref 96–108)
CLARITY UR: ABNORMAL
CO2 SERPL-SCNC: 28 MMOL/L (ref 21–32)
COLOR UR: ABNORMAL
CREAT SERPL-MCNC: 0.77 MG/DL (ref 0.6–1.3)
EOSINOPHIL # BLD MANUAL: 0 THOUSAND/UL (ref 0–0.4)
EOSINOPHIL NFR BLD MANUAL: 0 % (ref 0–6)
ERYTHROCYTE [DISTWIDTH] IN BLOOD BY AUTOMATED COUNT: 15.3 % (ref 11.6–15.1)
GFR SERPL CREATININE-BSD FRML MDRD: 87 ML/MIN/1.73SQ M
GIANT PLATELETS BLD QL SMEAR: PRESENT
GLUCOSE SERPL-MCNC: 213 MG/DL (ref 65–140)
GLUCOSE UR STRIP-MCNC: ABNORMAL MG/DL
HCT VFR BLD AUTO: 31.5 % (ref 34.8–46.1)
HGB BLD-MCNC: 10.4 G/DL (ref 11.5–15.4)
HGB UR QL STRIP.AUTO: ABNORMAL
INR PPP: 1.02 (ref 0.84–1.19)
KETONES UR STRIP-MCNC: NEGATIVE MG/DL
LACTATE SERPL-SCNC: 1.2 MMOL/L (ref 0.5–2)
LEUKOCYTE ESTERASE UR QL STRIP: ABNORMAL
LIPASE SERPL-CCNC: 122 U/L (ref 11–82)
LYMPHOCYTES # BLD AUTO: 0.44 THOUSAND/UL (ref 0.6–4.47)
LYMPHOCYTES # BLD AUTO: 2 % (ref 14–44)
MCH RBC QN AUTO: 30.2 PG (ref 26.8–34.3)
MCHC RBC AUTO-ENTMCNC: 33 G/DL (ref 31.4–37.4)
MCV RBC AUTO: 92 FL (ref 82–98)
MONOCYTES # BLD AUTO: 0.15 THOUSAND/UL (ref 0–1.22)
MONOCYTES NFR BLD: 1 % (ref 4–12)
NEUTROPHILS # BLD MANUAL: 14.09 THOUSAND/UL (ref 1.85–7.62)
NEUTS BAND NFR BLD MANUAL: 1 % (ref 0–8)
NEUTS SEG NFR BLD AUTO: 95 % (ref 43–75)
NITRITE UR QL STRIP: NEGATIVE
NON-SQ EPI CELLS URNS QL MICRO: ABNORMAL /HPF
OVALOCYTES BLD QL SMEAR: PRESENT
P AXIS: 61 DEGREES
PH UR STRIP.AUTO: 7.5 [PH]
PLATELET # BLD AUTO: 452 THOUSANDS/UL (ref 149–390)
PLATELET BLD QL SMEAR: ABNORMAL
PMV BLD AUTO: 9.4 FL (ref 8.9–12.7)
POIKILOCYTOSIS BLD QL SMEAR: PRESENT
POLYCHROMASIA BLD QL SMEAR: PRESENT
POTASSIUM SERPL-SCNC: 3.3 MMOL/L (ref 3.5–5.3)
PR INTERVAL: 162 MS
PROCALCITONIN SERPL-MCNC: 0.18 NG/ML
PROT SERPL-MCNC: 7.1 G/DL (ref 6.4–8.4)
PROT UR STRIP-MCNC: ABNORMAL MG/DL
PROTHROMBIN TIME: 13.6 SECONDS (ref 11.6–14.5)
QRS AXIS: 39 DEGREES
QRSD INTERVAL: 78 MS
QT INTERVAL: 306 MS
QTC INTERVAL: 417 MS
RBC # BLD AUTO: 3.44 MILLION/UL (ref 3.81–5.12)
RBC #/AREA URNS AUTO: ABNORMAL /HPF
RBC MORPH BLD: PRESENT
SODIUM SERPL-SCNC: 139 MMOL/L (ref 135–147)
SP GR UR STRIP.AUTO: 1.01 (ref 1–1.03)
T WAVE AXIS: 76 DEGREES
UROBILINOGEN UR STRIP-ACNC: <2 MG/DL
VARIANT LYMPHS # BLD AUTO: 1 %
VENTRICULAR RATE: 112 BPM
WBC # BLD AUTO: 14.68 THOUSAND/UL (ref 4.31–10.16)
WBC #/AREA URNS AUTO: ABNORMAL /HPF

## 2023-08-23 PROCEDURE — 80053 COMPREHEN METABOLIC PANEL: CPT

## 2023-08-23 PROCEDURE — 99285 EMERGENCY DEPT VISIT HI MDM: CPT

## 2023-08-23 PROCEDURE — 85027 COMPLETE CBC AUTOMATED: CPT

## 2023-08-23 PROCEDURE — G1004 CDSM NDSC: HCPCS

## 2023-08-23 PROCEDURE — 85007 BL SMEAR W/DIFF WBC COUNT: CPT

## 2023-08-23 PROCEDURE — 83690 ASSAY OF LIPASE: CPT

## 2023-08-23 PROCEDURE — 36415 COLL VENOUS BLD VENIPUNCTURE: CPT

## 2023-08-23 PROCEDURE — 96375 TX/PRO/DX INJ NEW DRUG ADDON: CPT

## 2023-08-23 PROCEDURE — 84145 PROCALCITONIN (PCT): CPT

## 2023-08-23 PROCEDURE — 85730 THROMBOPLASTIN TIME PARTIAL: CPT

## 2023-08-23 PROCEDURE — 85610 PROTHROMBIN TIME: CPT

## 2023-08-23 PROCEDURE — 96376 TX/PRO/DX INJ SAME DRUG ADON: CPT

## 2023-08-23 PROCEDURE — 87040 BLOOD CULTURE FOR BACTERIA: CPT

## 2023-08-23 PROCEDURE — 93005 ELECTROCARDIOGRAM TRACING: CPT

## 2023-08-23 PROCEDURE — 96361 HYDRATE IV INFUSION ADD-ON: CPT

## 2023-08-23 PROCEDURE — 93010 ELECTROCARDIOGRAM REPORT: CPT | Performed by: INTERNAL MEDICINE

## 2023-08-23 PROCEDURE — 81001 URINALYSIS AUTO W/SCOPE: CPT

## 2023-08-23 PROCEDURE — 99285 EMERGENCY DEPT VISIT HI MDM: CPT | Performed by: EMERGENCY MEDICINE

## 2023-08-23 PROCEDURE — 83605 ASSAY OF LACTIC ACID: CPT

## 2023-08-23 PROCEDURE — 96365 THER/PROPH/DIAG IV INF INIT: CPT

## 2023-08-23 PROCEDURE — 74177 CT ABD & PELVIS W/CONTRAST: CPT

## 2023-08-23 RX ORDER — ONDANSETRON 2 MG/ML
INJECTION INTRAMUSCULAR; INTRAVENOUS
Status: COMPLETED
Start: 2023-08-23 | End: 2023-08-23

## 2023-08-23 RX ORDER — HYDROMORPHONE HCL/PF 1 MG/ML
0.5 SYRINGE (ML) INJECTION ONCE
Status: COMPLETED | OUTPATIENT
Start: 2023-08-23 | End: 2023-08-23

## 2023-08-23 RX ORDER — ONDANSETRON 2 MG/ML
4 INJECTION INTRAMUSCULAR; INTRAVENOUS ONCE
Status: COMPLETED | OUTPATIENT
Start: 2023-08-23 | End: 2023-08-23

## 2023-08-23 RX ORDER — METRONIDAZOLE 500 MG/1
500 TABLET ORAL ONCE
Status: COMPLETED | OUTPATIENT
Start: 2023-08-23 | End: 2023-08-23

## 2023-08-23 RX ADMIN — ONDANSETRON 4 MG: 2 INJECTION INTRAMUSCULAR; INTRAVENOUS at 18:38

## 2023-08-23 RX ADMIN — IOHEXOL 90 ML: 350 INJECTION, SOLUTION INTRAVENOUS at 21:34

## 2023-08-23 RX ADMIN — METRONIDAZOLE 500 MG: 500 TABLET ORAL at 20:42

## 2023-08-23 RX ADMIN — HYDROMORPHONE HYDROCHLORIDE 0.5 MG: 1 INJECTION, SOLUTION INTRAMUSCULAR; INTRAVENOUS; SUBCUTANEOUS at 19:30

## 2023-08-23 RX ADMIN — CEFTRIAXONE SODIUM 2000 MG: 10 INJECTION, POWDER, FOR SOLUTION INTRAVENOUS at 20:42

## 2023-08-23 RX ADMIN — ONDANSETRON 4 MG: 2 INJECTION INTRAMUSCULAR; INTRAVENOUS at 21:56

## 2023-08-23 RX ADMIN — SODIUM CHLORIDE 1000 ML: 0.9 INJECTION, SOLUTION INTRAVENOUS at 19:30

## 2023-08-23 NOTE — TELEPHONE ENCOUNTER
When I reached out to patient to complete tcm process I spoke with Ludmila Archer (relative with her) and he reports patient is not wanting to eat/drink she is not wanting to walk when she is walking she uses a walker but carrying the walker. He has fluids/drinks for her with electrolytes but she did not want to drink. He reports her blood oxygen is in the range of 87%-90% with a heart rate range of 115-125. Last night around 2am she was in a lot of pain he was going to take her back to ED but was able to give her tylenol and it helped her sleep for a few hours. He asked at what point should he take her back to ED. (Triage call with TT) we advised that if levels continue to range lower than 90% she should go back to ED for monitoring and let us know so an ADT order can be placed. While on phone he reports her oxygen is at 95% but if it is begins to go low and stay there he will call back. Please review documentation and advise if there are additional directions for Angel Leonard.

## 2023-08-23 NOTE — ED PROVIDER NOTES
History  Chief Complaint   Patient presents with   • Abdominal Pain     Pt reporting extreme abd pain after surgery this past week     Is a 61-year-old female with history of rectal cancer status post resection with colostomy about a week ago presenting with abdominal pain. She states that the abdominal pain started around 2 a.m. today. Since then she pain has not really subsided. She had associated nausea. She had 1 episode of emesis yesterday after taking oral pain medication, but states that that has happened before. Because pain was not improving, she decided to return to the ED today. Upon arrival to the ED she started having significant projectile vomiting. She was given Zofran which improved the symptoms. She is also felt fatigued and lightheaded. She notes that her ostomy bag output was a little bit darker than before, and she had stool come out of her rectum even though she just had a colostomy. She denies any headache, chest pain, shortness of breath, urinary symptoms, numbness, tingling, or weakness. Prior to Admission Medications   Prescriptions Last Dose Informant Patient Reported?  Taking?   acetaminophen (TYLENOL) 500 mg tablet  Self No No   Sig: Take 2 tablets (1,000 mg total) by mouth 3 (three) times a day   amLODIPine (NORVASC) 10 mg tablet  Self No No   Sig: Take 1 tablet (10 mg total) by mouth daily   Patient taking differently: Take 20 mg by mouth daily   cyanocobalamin (VITAMIN B-12) 1000 MCG tablet  Self Yes No   Sig: Take 1,000 mcg by mouth daily   enoxaparin (LOVENOX) 40 mg/0.4 mL   No No   Sig: Inject 0.4 mL (40 mg total) under the skin every 24 hours for 24 days   gabapentin (Neurontin) 100 mg capsule   No No   Sig: Take 2 capsules (200 mg total) by mouth 2 (two) times a day To reduce shingles pain   methocarbamol (ROBAXIN) 500 mg tablet   No No   Sig: Take 1 tablet (500 mg total) by mouth every 8 (eight) hours for 5 days   morphine (MSIR) 15 mg tablet   No No   Sig: Take 0.5-1 tablets (7.5-15 mg total) by mouth every 4 (four) hours as needed for severe pain or moderate pain for up to 15 days Max Daily Amount: 90 mg   oxybutynin (DITROPAN) 5 mg tablet   No No   Sig: Take 1 tablet (5 mg total) by mouth every 6 (six) hours as needed (bladder spasms)   potassium chloride (MICRO-K) 10 MEQ CR capsule   No No   Sig: Take 4 capsules (40 mEq total) by mouth 2 (two) times a day   predniSONE 10 mg tablet  Self No No   Sig: Take 4 tablets (40 mg total) by mouth daily Please take for 4 weeks and then decrease by 5 mg increments every 2 weeks. Patient taking differently: Take 30 mg by mouth daily Please take for 4 weeks and then decrease by 5 mg increments every 2 weeks.  -- Taking 20mg and then 10mg - titrating   ursodiol (ACTIGALL) 300 mg capsule  Self No No   Sig: Take 3 capsules (900 mg total) by mouth in the morning      Facility-Administered Medications: None       Past Medical History:   Diagnosis Date   • Cancer ([address])    • Colon cancer ([address])    • Fall    • Headache    • Hemochromatosis, unspecified    • History of transfusion     2022 - no adverse reaction   • Hypertension    • Kidney calculi    • Kidney stone    • Liver disease     hemangioma   • Muscle weakness    • Personal history of COVID-19 12/2022   • Sarcoidosis    • Seizures ([address])     2022       Past Surgical History:   Procedure Laterality Date   • ABSCESS DRAINAGE      left breast   • BREAST BIOPSY     • CHEST TUBE INSERTION     • COLON SURGERY      colostomy   • COLONOSCOPY     • FL GUIDED CENTRAL VENOUS ACCESS DEVICE INSERTION  03/21/2023   • FL RETROGRADE PYELOGRAM  01/23/2023   • FL RETROGRADE PYELOGRAM  04/03/2023   • FL RETROGRADE PYELOGRAM  7/21/2023   • IR BIOPSY LIVER MASS  05/09/2023   • LUNG BIOPSY     • UT CYSTO BLADDER W/URETERAL CATHETERIZATION Bilateral 07/21/2023    Procedure: CYSTOSCOPY URETEROSCOPY RETROGRADE PYELOGRAM WITH BILATERAL STENT URETERAL EXCHANGE; LEFT LASER LITHOTRIPSY AND STONE BASKET RETRIEVAL; Surgeon: Nas Minor MD;  Location: [facility];  Service: Urology   • TX CYSTO/URETERO W/LITHOTRIPSY &INDWELL STENT INSRT Bilateral 01/23/2023    Procedure: CYSTOSCOPY  RIGHT URETEROSCOPY WITH LITHOTRIPSY HOLMIUM LASER, BILATERAL RETROGRADE PYELOGRAM AND BILATERAL  URETERAL STENT INSERTION;  Surgeon: Nas Minor MD;  [facility];  Service: Urology   • TX CYSTO/URETERO W/LITHOTRIPSY &INDWELL STENT INSRT Right 04/03/2023    Procedure: RIGHT URETEROSCOPY WITH LITHOTRIPSY HOLMIUM LASER, RETROGRADE PYELOGRAM; BILATERAL EXCHANGE STENT URETERAL;  Surgeon: Nas Minor MD;  [facility];  Service: Urology   • TX INSJ TUNNELED CTR VAD W/SUBQ PORT AGE 5 YR/> N/A 03/21/2023    Procedure: INSERTION VENOUS PORT ( PORT-A-CATH) IR;  Surgeon: Anders Queen DO;  Location: [facility];  Service: Interventional Radiology   • TX LAPS COLECTOMY PRTL W/COLOPXTSTMY LW ANAST N/A 8/17/2023    Procedure: RESECTION COLON LOW ANTERIOR LAPAROSCOPIC WITH ROBOTICS;  Surgeon: Fuentes Rea MD;  Location: [facility];  Service: Colorectal   • TONSILLECTOMY     • URINARY SURGERY Bilateral     bilateral stents       Family History   Problem Relation Age of Onset   • Cancer Mother    • Cirrhosis Father    • Breast cancer Neg Hx    • Breast cancer additional onset Neg Hx      I have reviewed and agree with the history as documented.     E-Cigarette/Vaping   • E-Cigarette Use Never User      E-Cigarette/Vaping Substances   • Nicotine No    • THC No    • CBD No    • Flavoring No    • Other No    • Unknown No      Social History     Tobacco Use   • Smoking status: Never     Passive exposure: Past   • Smokeless tobacco: Never   Vaping Use   • Vaping Use: Never used   Substance Use Topics   • Alcohol use: Never   • Drug use: Never        Review of Systems    Physical Exam  ED Triage Vitals [08/23/23 1835]   Temperature Pulse Respirations Blood Pressure SpO2   98.4 °F (36.9 °C) (!) 120 20 167/81 99 % Temp Source Heart Rate Source Patient Position - Orthostatic VS BP Location FiO2 (%)   Oral Monitor Sitting Right arm --      Pain Score       5             Orthostatic Vital Signs  Vitals:    08/24/23 0545 08/24/23 0800 08/24/23 0900 08/24/23 1300   BP: 134/66 124/58 131/60 132/62   Pulse: 98 96 96 94   Patient Position - Orthostatic VS:  Lying         Physical Exam  Vitals and nursing note reviewed. Constitutional:       Appearance: She is well-developed. Comments: Very uncomfortable   HENT:      Head: Normocephalic and atraumatic. Cardiovascular:      Rate and Rhythm: Regular rhythm. Tachycardia present. Heart sounds: Normal heart sounds. Pulmonary:      Effort: Pulmonary effort is normal.      Breath sounds: Normal breath sounds. Abdominal:      General: Abdomen is flat. Palpations: Abdomen is soft. Tenderness: There is generalized abdominal tenderness. Skin:     General: Skin is warm and dry. Neurological:      General: No focal deficit present. Mental Status: She is alert and oriented to person, place, and time.          ED Medications  Medications   lactated ringers infusion (125 mL/hr Intravenous New Bag 8/24/23 0336)   heparin (porcine) subcutaneous injection 5,000 Units (5,000 Units Subcutaneous Given 8/24/23 1336)   potassium chloride 20 mEq IVPB (premix) (20 mEq Intravenous Not Given 8/24/23 0325)   phenol (CHLORASEPTIC) 1.4 % mucosal liquid 1 spray (1 spray Mouth/Throat Given 8/24/23 1227)   saliva substitute (MOUTH KOTE) mucosal solution 5 spray (5 sprays Mouth/Throat Given 8/24/23 1227)   HYDROmorphone HCl (DILAUDID) injection 0.2 mg (0.2 mg Intravenous Given 8/24/23 1336)   ondansetron (ZOFRAN) injection 4 mg (4 mg Intravenous Given 8/23/23 1838)   HYDROmorphone (DILAUDID) injection 0.5 mg (0.5 mg Intravenous Given 8/23/23 1930)   sodium chloride 0.9 % bolus 1,000 mL (0 mL Intravenous Stopped 8/23/23 2342)   ceftriaxone (ROCEPHIN) 2 g/50 mL in dextrose IVPB (0 mg Intravenous Stopped 8/23/23 2125)   metroNIDAZOLE (FLAGYL) tablet 500 mg (500 mg Oral Given 8/23/23 2042)   iohexol (OMNIPAQUE) 350 MG/ML injection (SINGLE-DOSE) 100 mL (90 mL Intravenous Given 8/23/23 2134)   ondansetron (ZOFRAN) injection 4 mg (4 mg Intravenous Given 8/23/23 2156)   HYDROmorphone HCl (DILAUDID) injection 0.2 mg (0.2 mg Intravenous Given 8/24/23 0220)   diazepam (VALIUM) injection 2.5 mg (2.5 mg Intravenous Given 8/24/23 0301)   ondansetron (ZOFRAN) injection 4 mg (4 mg Intravenous Given 8/24/23 0305)       Diagnostic Studies  Results Reviewed     Procedure Component Value Units Date/Time    Basic metabolic panel [[id number]]  (Abnormal) Collected: 08/24/23 0335    Lab Status: Final result Specimen: Blood from Arm, Left Updated: 08/24/23 0403     Sodium 141 mmol/L      Potassium 3.4 mmol/L      Chloride 104 mmol/L      CO2 27 mmol/L      ANION GAP 10 mmol/L      BUN 14 mg/dL      Creatinine 0.74 mg/dL      Glucose 116 mg/dL      Calcium 8.8 mg/dL      eGFR 91 ml/min/1.73sq m     Narrative:      [place] guidelines for Chronic Kidney Disease (CKD):   •  Stage 1 with normal or high GFR (GFR > 90 mL/min/1.73 square meters)  •  Stage 2 Mild CKD (GFR = 60-89 mL/min/1.73 square meters)  •  Stage 3A Moderate CKD (GFR = 45-59 mL/min/1.73 square meters)  •  Stage 3B Moderate CKD (GFR = 30-44 mL/min/1.73 square meters)  •  Stage 4 Severe CKD (GFR = 15-29 mL/min/1.73 square meters)  •  Stage 5 End Stage CKD (GFR <15 mL/min/1.73 square meters)  Note: GFR calculation is accurate only with a steady state creatinine    Magnesium [[id number]]  (Abnormal) Collected: 08/24/23 0335    Lab Status: Final result Specimen: Blood from Arm, Left Updated: 08/24/23 0403     Magnesium 1.8 mg/dL     Phosphorus [[id number]]  (Normal) Collected: 08/24/23 0335    Lab Status: Final result Specimen: Blood from Arm, Left Updated: 08/24/23 0403     Phosphorus 3.1 mg/dL     CBC and differential [[id number]] (Abnormal) Collected: 08/24/23 0335    Lab Status: Final result Specimen: Blood from Arm, Left Updated: 08/24/23 0354     WBC 13.33 Thousand/uL      RBC 3.15 Million/uL      Hemoglobin 9.4 g/dL      Hematocrit 29.6 %      MCV 94 fL      MCH 29.8 pg      MCHC 31.8 g/dL      RDW 15.4 %      MPV 9.1 fL      Platelets 100 Thousands/uL     Narrative:      See Manual Diff Done Within 3 Days  This is an appended report. These results have been appended to a previously verified report. Blood culture #1 [[id number]] Collected: 08/23/23 2036    Lab Status: Preliminary result Specimen: Blood from Arm, Right Updated: 08/24/23 0002     Blood Culture Received in Microbiology Lab. Culture in Progress. Blood culture #2 [[id number]] Collected: 08/23/23 2036    Lab Status: Preliminary result Specimen: Blood from Arm, Left Updated: 08/24/23 0002     Blood Culture Received in Microbiology Lab. Culture in Progress. Procalcitonin [[id number]]  (Normal) Collected: 08/23/23 2036    Lab Status: Final result Specimen: Blood from Arm, Left Updated: 08/23/23 2116     Procalcitonin 0.18 ng/ml     Lactic acid [[id number]]  (Normal) Collected: 08/23/23 2036    Lab Status: Final result Specimen: Blood from Arm, Right Updated: 08/23/23 2109     LACTIC ACID 1.2 mmol/L     Narrative:      Result may be elevated if tourniquet was used during collection.     Radha Hall [[id number]]  (Normal) Collected: 08/23/23 2036    Lab Status: Final result Specimen: Blood from Arm, Left Updated: 08/23/23 2103     Protime 13.6 seconds      INR 1.02    APTT [[id number]]  (Normal) Collected: 08/23/23 2036    Lab Status: Final result Specimen: Blood from Arm, Left Updated: 08/23/23 2103     PTT 31 seconds     RBC Morphology Reflex Test [[id number]] Collected: 08/23/23 1930    Lab Status: Final result Specimen: Blood from Arm, Right Updated: 08/23/23 2101    Urine Microscopic [[id number]]  (Abnormal) Collected: 08/23/23 2038    Lab Status: Final result Specimen: Urine, Other Updated: 08/23/23 2051     RBC, UA 10-20 /hpf      WBC, UA 2-4 /hpf      Epithelial Cells None Seen /hpf      Bacteria, UA None Seen /hpf     UA w Reflex to Microscopic w Reflex to Culture [[id number]]  (Abnormal) Collected: 08/23/23 2038    Lab Status: Final result Specimen: Urine, Other Updated: 08/23/23 2049     Color, UA Light Yellow     Clarity, UA Turbid     Specific Gravity, UA 1.012     pH, UA 7.5     Leukocytes, UA Small     Nitrite, UA Negative     Protein, UA Trace mg/dl      Glucose, UA Trace mg/dl      Ketones, UA Negative mg/dl      Urobilinogen, UA <2.0 mg/dl      Bilirubin, UA Negative     Occult Blood, UA Trace    CBC and differential [[id number]]  (Abnormal) Collected: 08/23/23 1930    Lab Status: Final result Specimen: Blood from Arm, Right Updated: 08/23/23 2030     WBC 14.68 Thousand/uL      RBC 3.44 Million/uL      Hemoglobin 10.4 g/dL      Hematocrit 31.5 %      MCV 92 fL      MCH 30.2 pg      MCHC 33.0 g/dL      RDW 15.3 %      MPV 9.4 fL      Platelets 964 Thousands/uL     Narrative: This is an appended report. These results have been appended to a previously verified report.     Manual Differential(PHLEBS Do Not Order) [[id number]]  (Abnormal) Collected: 08/23/23 1930    Lab Status: Final result Specimen: Blood from Arm, Right Updated: 08/23/23 2030     Segmented % 95 %      Bands % 1 %      Lymphocytes % 2 %      Monocytes % 1 %      Eosinophils, % 0 %      Basophils % 0 %      Atypical Lymphocytes % 1 %      Absolute Neutrophils 14.09 Thousand/uL      Lymphocytes Absolute 0.44 Thousand/uL      Monocytes Absolute 0.15 Thousand/uL      Eosinophils Absolute 0.00 Thousand/uL      Basophils Absolute 0.00 Thousand/uL      Total Counted --     RBC Morphology Present     Platelet Estimate Increased     Giant PLTs Present     Anisocytosis Present     Ovalocytes Present     Poikilocytes Present     Polychromasia Present    CMP [[id number]]  (Abnormal) Collected: 08/23/23 1930    Lab Status: Final result Specimen: Blood from Arm, Right Updated: 08/23/23 1958     Sodium 139 mmol/L      Potassium 3.3 mmol/L      Chloride 98 mmol/L      CO2 28 mmol/L      ANION GAP 13 mmol/L      BUN 17 mg/dL      Creatinine 0.77 mg/dL      Glucose 213 mg/dL      Calcium 9.5 mg/dL      AST 40 U/L      ALT 44 U/L      Alkaline Phosphatase 346 U/L      Total Protein 7.1 g/dL      Albumin 3.5 g/dL      Total Bilirubin 1.25 mg/dL      eGFR 87 ml/min/1.73sq m     Narrative:      [place] guidelines for Chronic Kidney Disease (CKD):   •  Stage 1 with normal or high GFR (GFR > 90 mL/min/1.73 square meters)  •  Stage 2 Mild CKD (GFR = 60-89 mL/min/1.73 square meters)  •  Stage 3A Moderate CKD (GFR = 45-59 mL/min/1.73 square meters)  •  Stage 3B Moderate CKD (GFR = 30-44 mL/min/1.73 square meters)  •  Stage 4 Severe CKD (GFR = 15-29 mL/min/1.73 square meters)  •  Stage 5 End Stage CKD (GFR <15 mL/min/1.73 square meters)  Note: GFR calculation is accurate only with a steady state creatinine    Lipase [[id number]]  (Abnormal) Collected: 08/23/23 1930    Lab Status: Final result Specimen: Blood from Arm, Right Updated: 08/23/23 1958     Lipase 122 u/L                  XR chest portable   Final Result by Hyun Munguia MD (08/24 0801)   Suspected subsegmental atelectasis at both lung bases         Workstation performed: [id number]         CT Abdomen pelvis with contrast   Final Result by Aguila Espinoza MD (08/23 2252)      Multiple loops of abnormally dilated proximal to mid small bowel with relative collapse of the distal small bowel most compatible with small bowel obstruction. A clear transition point is not identified. Surgical consultation is advised. A few scattered tiny foci of free intraperitoneal air are noted likely postoperative in nature in this patient with a history of recent low anterior colon resection with right mid abdominal ileostomy.       Small amount of intra-abdominal and pelvic ascites. Stable position of bilateral ureteral stents with no significant hydronephrosis. I personally discussed this study with Dr Maximiliano Felipe on 8/23/2023 10:46 PM.            Workstation performed: [id number]               Procedures  Procedures      ED Course  ED Course as of 08/24/23 1600   Wed Aug 23, 2023   1952 WBC(!): 14.68                             SBIRT 22yo+    Flowsheet Row Most Recent Value   Initial Alcohol Screen: US AUDIT-C     1. How often do you have a drink containing alcohol? 0 Filed at: 08/24/2023 0015   2. How many drinks containing alcohol do you have on a typical day you are drinking? 0 Filed at: 08/24/2023 0015   3b. FEMALE Any Age, or MALE 65+: How often do you have 4 or more drinks on one occassion? 0 Filed at: 08/24/2023 0015   Audit-C Score 0 Filed at: 08/24/2023 1963   ENRIQUE: How many times in the past year have you. .. Used an illegal drug or used a prescription medication for non-medical reasons? Never Filed at: 08/24/2023 0015                Medical Decision Making  Patient is a 51-year-old female presenting with abdominal pain status post resection and colostomy. Differential diagnosis includes infection versus dehiscence versus bowel obstruction. CBC, CMP, lipase, and CT scan ordered. Patient was signed out to Dr. Maximiliano Felipe for further management. Patient was admitted to surgery. Amount and/or Complexity of Data Reviewed  Labs: ordered. Decision-making details documented in ED Course. Radiology: ordered. Risk  Prescription drug management. Decision regarding hospitalization.             Disposition  Final diagnoses:   Small bowel obstruction ([address])     Time reflects when diagnosis was documented in both MDM as applicable and the Disposition within this note     Time User Action Codes Description Comment    8/24/2023 12:20 AM Stephanie Mask Add [X88.951] Small bowel obstruction [facility])       ED Disposition     ED Disposition   Admit    Condition   Stable    Date/Time   Thu Aug 24, 2023 12:20 AM    Comment   Case was discussed with white surgery and the patient's admission status was agreed to be Admission Status: inpatient status to the service of Dr. Antonio Guadalupe    None         Patient's Medications   Discharge Prescriptions    No medications on file     No discharge procedures on file. PDMP Review       Value Time User    PDMP Reviewed  Yes 8/21/2023  8:56 AM Creig Peers, DO           ED Provider  Attending physically available and evaluated Marlyn Page. I managed the patient along with the ED Attending.     Electronically Signed by         Emerita Hampton MD  08/24/23 2146

## 2023-08-24 ENCOUNTER — APPOINTMENT (INPATIENT)
Dept: RADIOLOGY | Facility: HOSPITAL | Age: 58
End: 2023-08-24
Payer: COMMERCIAL

## 2023-08-24 ENCOUNTER — HOME CARE VISIT (OUTPATIENT)
Dept: HOME HEALTH SERVICES | Facility: HOME HEALTHCARE | Age: 58
End: 2023-08-24
Payer: COMMERCIAL

## 2023-08-24 ENCOUNTER — DOCUMENTATION (OUTPATIENT)
Dept: HEMATOLOGY ONCOLOGY | Facility: CLINIC | Age: 58
End: 2023-08-24

## 2023-08-24 ENCOUNTER — TELEPHONE (OUTPATIENT)
Dept: HEMATOLOGY ONCOLOGY | Facility: CLINIC | Age: 58
End: 2023-08-24

## 2023-08-24 DIAGNOSIS — C20 RECTAL CANCER (HCC): Primary | ICD-10-CM

## 2023-08-24 LAB
ANION GAP SERPL CALCULATED.3IONS-SCNC: 10 MMOL/L
BUN SERPL-MCNC: 14 MG/DL (ref 5–25)
CALCIUM SERPL-MCNC: 8.8 MG/DL (ref 8.4–10.2)
CHLORIDE SERPL-SCNC: 104 MMOL/L (ref 96–108)
CO2 SERPL-SCNC: 27 MMOL/L (ref 21–32)
CREAT SERPL-MCNC: 0.74 MG/DL (ref 0.6–1.3)
ERYTHROCYTE [DISTWIDTH] IN BLOOD BY AUTOMATED COUNT: 15.4 % (ref 11.6–15.1)
GFR SERPL CREATININE-BSD FRML MDRD: 91 ML/MIN/1.73SQ M
GLUCOSE SERPL-MCNC: 116 MG/DL (ref 65–140)
HCT VFR BLD AUTO: 29.6 % (ref 34.8–46.1)
HGB BLD-MCNC: 9.4 G/DL (ref 11.5–15.4)
MAGNESIUM SERPL-MCNC: 1.8 MG/DL (ref 1.9–2.7)
MCH RBC QN AUTO: 29.8 PG (ref 26.8–34.3)
MCHC RBC AUTO-ENTMCNC: 31.8 G/DL (ref 31.4–37.4)
MCV RBC AUTO: 94 FL (ref 82–98)
PHOSPHATE SERPL-MCNC: 3.1 MG/DL (ref 2.7–4.5)
PLATELET # BLD AUTO: 387 THOUSANDS/UL (ref 149–390)
PMV BLD AUTO: 9.1 FL (ref 8.9–12.7)
POTASSIUM SERPL-SCNC: 3.4 MMOL/L (ref 3.5–5.3)
RBC # BLD AUTO: 3.15 MILLION/UL (ref 3.81–5.12)
SODIUM SERPL-SCNC: 141 MMOL/L (ref 135–147)
WBC # BLD AUTO: 13.33 THOUSAND/UL (ref 4.31–10.16)

## 2023-08-24 PROCEDURE — 85027 COMPLETE CBC AUTOMATED: CPT

## 2023-08-24 PROCEDURE — 99024 POSTOP FOLLOW-UP VISIT: CPT | Performed by: COLON & RECTAL SURGERY

## 2023-08-24 PROCEDURE — 88309 TISSUE EXAM BY PATHOLOGIST: CPT | Performed by: PATHOLOGY

## 2023-08-24 PROCEDURE — 36415 COLL VENOUS BLD VENIPUNCTURE: CPT

## 2023-08-24 PROCEDURE — 88304 TISSUE EXAM BY PATHOLOGIST: CPT | Performed by: PATHOLOGY

## 2023-08-24 PROCEDURE — 80048 BASIC METABOLIC PNL TOTAL CA: CPT

## 2023-08-24 PROCEDURE — 88341 IMHCHEM/IMCYTCHM EA ADD ANTB: CPT | Performed by: PATHOLOGY

## 2023-08-24 PROCEDURE — 88342 IMHCHEM/IMCYTCHM 1ST ANTB: CPT | Performed by: PATHOLOGY

## 2023-08-24 PROCEDURE — 71045 X-RAY EXAM CHEST 1 VIEW: CPT

## 2023-08-24 PROCEDURE — 84100 ASSAY OF PHOSPHORUS: CPT

## 2023-08-24 PROCEDURE — 83735 ASSAY OF MAGNESIUM: CPT

## 2023-08-24 RX ORDER — HEPARIN SODIUM 5000 [USP'U]/ML
5000 INJECTION, SOLUTION INTRAVENOUS; SUBCUTANEOUS EVERY 8 HOURS SCHEDULED
Status: DISCONTINUED | OUTPATIENT
Start: 2023-08-24 | End: 2023-08-29 | Stop reason: HOSPADM

## 2023-08-24 RX ORDER — POTASSIUM CHLORIDE 14.9 MG/ML
20 INJECTION INTRAVENOUS ONCE
Status: DISCONTINUED | OUTPATIENT
Start: 2023-08-24 | End: 2023-08-25

## 2023-08-24 RX ORDER — HYDROMORPHONE HCL IN WATER/PF 6 MG/30 ML
0.2 PATIENT CONTROLLED ANALGESIA SYRINGE INTRAVENOUS EVERY 4 HOURS PRN
Status: DISCONTINUED | OUTPATIENT
Start: 2023-08-24 | End: 2023-08-28

## 2023-08-24 RX ORDER — SODIUM CHLORIDE, SODIUM LACTATE, POTASSIUM CHLORIDE, CALCIUM CHLORIDE 600; 310; 30; 20 MG/100ML; MG/100ML; MG/100ML; MG/100ML
125 INJECTION, SOLUTION INTRAVENOUS CONTINUOUS
Status: DISCONTINUED | OUTPATIENT
Start: 2023-08-24 | End: 2023-08-26

## 2023-08-24 RX ORDER — HYDROMORPHONE HCL IN WATER/PF 6 MG/30 ML
0.2 PATIENT CONTROLLED ANALGESIA SYRINGE INTRAVENOUS ONCE
Status: COMPLETED | OUTPATIENT
Start: 2023-08-24 | End: 2023-08-24

## 2023-08-24 RX ORDER — ONDANSETRON 2 MG/ML
4 INJECTION INTRAMUSCULAR; INTRAVENOUS ONCE
Status: COMPLETED | OUTPATIENT
Start: 2023-08-24 | End: 2023-08-24

## 2023-08-24 RX ORDER — ONDANSETRON 2 MG/ML
4 INJECTION INTRAMUSCULAR; INTRAVENOUS EVERY 4 HOURS PRN
Status: DISCONTINUED | OUTPATIENT
Start: 2023-08-24 | End: 2023-08-29 | Stop reason: HOSPADM

## 2023-08-24 RX ORDER — XYLITOL/YERBA SANTA
5 AEROSOL, SPRAY WITH PUMP (ML) MUCOUS MEMBRANE 4 TIMES DAILY PRN
Status: DISCONTINUED | OUTPATIENT
Start: 2023-08-24 | End: 2023-08-29 | Stop reason: HOSPADM

## 2023-08-24 RX ORDER — DIAZEPAM 5 MG/ML
2.5 INJECTION, SOLUTION INTRAMUSCULAR; INTRAVENOUS ONCE
Status: COMPLETED | OUTPATIENT
Start: 2023-08-24 | End: 2023-08-24

## 2023-08-24 RX ADMIN — HEPARIN SODIUM 5000 UNITS: 5000 INJECTION INTRAVENOUS; SUBCUTANEOUS at 21:08

## 2023-08-24 RX ADMIN — ONDANSETRON 4 MG: 2 INJECTION INTRAMUSCULAR; INTRAVENOUS at 03:05

## 2023-08-24 RX ADMIN — ONDANSETRON 4 MG: 2 INJECTION INTRAMUSCULAR; INTRAVENOUS at 22:15

## 2023-08-24 RX ADMIN — PHENOL 1 SPRAY: 1.5 LIQUID ORAL at 12:27

## 2023-08-24 RX ADMIN — DIAZEPAM 2.5 MG: 5 INJECTION INTRAMUSCULAR; INTRAVENOUS at 03:01

## 2023-08-24 RX ADMIN — HYDROMORPHONE HYDROCHLORIDE 0.2 MG: 0.2 INJECTION, SOLUTION INTRAMUSCULAR; INTRAVENOUS; SUBCUTANEOUS at 09:33

## 2023-08-24 RX ADMIN — PHENOL 1 SPRAY: 1.5 LIQUID ORAL at 09:31

## 2023-08-24 RX ADMIN — PHENOL 1 SPRAY: 1.5 LIQUID ORAL at 17:40

## 2023-08-24 RX ADMIN — Medication 5 SPRAY: at 17:40

## 2023-08-24 RX ADMIN — Medication 5 SPRAY: at 12:27

## 2023-08-24 RX ADMIN — HYDROMORPHONE HYDROCHLORIDE 0.2 MG: 0.2 INJECTION, SOLUTION INTRAMUSCULAR; INTRAVENOUS; SUBCUTANEOUS at 02:20

## 2023-08-24 RX ADMIN — HEPARIN SODIUM 5000 UNITS: 5000 INJECTION INTRAVENOUS; SUBCUTANEOUS at 13:36

## 2023-08-24 RX ADMIN — SODIUM CHLORIDE, SODIUM LACTATE, POTASSIUM CHLORIDE, AND CALCIUM CHLORIDE 125 ML/HR: .6; .31; .03; .02 INJECTION, SOLUTION INTRAVENOUS at 20:51

## 2023-08-24 RX ADMIN — Medication 5 SPRAY: at 09:31

## 2023-08-24 RX ADMIN — SODIUM CHLORIDE, SODIUM LACTATE, POTASSIUM CHLORIDE, AND CALCIUM CHLORIDE 125 ML/HR: .6; .31; .03; .02 INJECTION, SOLUTION INTRAVENOUS at 03:36

## 2023-08-24 RX ADMIN — HYDROMORPHONE HYDROCHLORIDE 0.2 MG: 0.2 INJECTION, SOLUTION INTRAMUSCULAR; INTRAVENOUS; SUBCUTANEOUS at 13:36

## 2023-08-24 RX ADMIN — HYDROMORPHONE HYDROCHLORIDE 0.2 MG: 0.2 INJECTION, SOLUTION INTRAMUSCULAR; INTRAVENOUS; SUBCUTANEOUS at 17:40

## 2023-08-24 RX ADMIN — HYDROMORPHONE HYDROCHLORIDE 0.2 MG: 0.2 INJECTION, SOLUTION INTRAMUSCULAR; INTRAVENOUS; SUBCUTANEOUS at 21:53

## 2023-08-24 NOTE — H&P
H&P - Colorectal Surgery  : [facility]  Maricela Seip 56 y.o. female MRN: [id number]  Unit/Bed#: ED 02 Encounter: [id number]    Assessment:  56y.o. year old female s/p 8/17 LAR, colostomy reversal, diverting loop ileostomy for rectal adenocarcinoma. Discharged 8/21. Presented now with small bowel obstruction on CT without clear transition point. On presentation, WBC 14, afebrile, mild tachycardia. Abdomen soft, mildly distended, mildly tender. Gas and stool today per ostomy and rectum. Plan:  - N.p.o./NG tube  - IV fluids  - Continue to monitor abdominal exam  - Monitor bowel function  - Pain and nausea control as needed  - Palliative care for guidance with pain regimen  - Replete e-lytes  - DVT prophylaxis  - Encourage IS and ambulation    HPI:  Maricela Seip is a 56 y.o. female with a history of rectal adenocarcinoma status post 8/17 LAR colostomy reversal, diverting loop ileostomy. Presented to ED 8/23 with nausea vomiting abdominal pain found to have small bowel obstruction without clear transition point. Reports feeling weakness, decreased appetite, nausea, vomiting, reflux. Patient endorses passing flatus and having bowel movements both per bag and per rectum. No fever no chills. Physical Exam  Vitals reviewed. Constitutional:       General: She is not in acute distress. Appearance: She is ill-appearing. HENT:      Head: Normocephalic and atraumatic. Mouth/Throat:      Pharynx: Oropharynx is clear. Eyes:      Extraocular Movements: Extraocular movements intact. Conjunctiva/sclera: Conjunctivae normal.   Cardiovascular:      Rate and Rhythm: Tachycardia present. Pulmonary:      Effort: Pulmonary effort is normal. No respiratory distress. Comments: On room air  Abdominal:      General: There is distension (Mildly). Palpations: Abdomen is soft. Tenderness: There is abdominal tenderness (Mildly, global tenderness). Musculoskeletal:      Right lower leg: No edema. Left lower leg: No edema. Skin:     General: Skin is dry. Neurological:      General: No focal deficit present. Mental Status: She is alert. Review of Systems   Constitutional: Positive for activity change, appetite change and fatigue. Negative for chills and fever. Respiratory: Positive for shortness of breath. Negative for cough. Cardiovascular: Negative for leg swelling. Gastrointestinal: Positive for abdominal pain, nausea and vomiting. Negative for constipation and diarrhea. Genitourinary: Negative for difficulty urinating. Neurological: Positive for dizziness. Negative for headaches. Objective         Intake/Output Summary (Last 24 hours) at 8/24/2023 0421  Last data filed at 8/24/2023 0401  Gross per 24 hour   Intake --   Output 800 ml   Net -800 ml       First Vitals:   Blood Pressure: 167/81 (08/23/23 1835)  Pulse: (!) 120 (08/23/23 1835)  Temperature: 98.4 °F (36.9 °C) (08/23/23 1835)  Temp Source: Oral (08/23/23 1835)  Respirations: 20 (08/23/23 1835)  SpO2: 99 % (08/23/23 1835)    Current Vitals:   Blood Pressure: 144/70 (08/24/23 0100)  Pulse: 98 (08/24/23 0100)  Temperature: 98.4 °F (36.9 °C) (08/23/23 1835)  Temp Source: Oral (08/23/23 1835)  Respirations: 15 (08/24/23 0100)  SpO2: 92 % (08/24/23 0100)    Invasive Devices     Central Venous Catheter Line  Duration           Port A Cath 03/21/23 Right Internal jugular 155 days          Peripheral Intravenous Line  Duration           Peripheral IV 08/23/23 Left Antecubital <1 day          Drain  Duration           Ureteral Internal Stent Left ureter 6 Fr. 33 days    Ureteral Internal Stent Right ureter 7 Fr. 33 days    Ileostomy Loop RLQ 6 days    NG/OG/Enteral Tube Nasogastric 18 Fr Right nare <1 day                Imaging: I have personally reviewed pertinent reports.       CT Abdomen pelvis with contrast    Result Date: 8/23/2023  Impression: Multiple loops of abnormally dilated proximal to mid small bowel with relative collapse of the distal small bowel most compatible with small bowel obstruction. A clear transition point is not identified. Surgical consultation is advised. A few scattered tiny foci of free intraperitoneal air are noted likely postoperative in nature in this patient with a history of recent low anterior colon resection with right mid abdominal ileostomy. Small amount of intra-abdominal and pelvic ascites. Stable position of bilateral ureteral stents with no significant hydronephrosis. I personally discussed this study with Dr Hailey Mancia on 8/23/2023 10:46 PM. Workstation performed: [id number]       EKG, Pathology, and Other Studies: I have personally reviewed pertinent reports.     VTE Pharmacologic Prophylaxis: Heparin  VTE Mechanical Prophylaxis: sequential compression device    Historical Information   Past Medical History:   Diagnosis Date   • Cancer [facility])    • Colon cancer ([address])    • Fall    • Headache    • Hemochromatosis, unspecified    • History of transfusion     2022 - no adverse reaction   • Hypertension    • Kidney calculi    • Kidney stone    • Liver disease     hemangioma   • Muscle weakness    • Personal history of COVID-19 12/2022   • Sarcoidosis    • Seizures ([address])     2022     Past Surgical History:   Procedure Laterality Date   • ABSCESS DRAINAGE      left breast   • BREAST BIOPSY     • CHEST TUBE INSERTION     • COLON SURGERY      colostomy   • COLONOSCOPY     • FL GUIDED CENTRAL VENOUS ACCESS DEVICE INSERTION  03/21/2023   • FL RETROGRADE PYELOGRAM  01/23/2023   • FL RETROGRADE PYELOGRAM  04/03/2023   • FL RETROGRADE PYELOGRAM  7/21/2023   • IR BIOPSY LIVER MASS  05/09/2023   • LUNG BIOPSY     • TX CYSTO BLADDER W/URETERAL CATHETERIZATION Bilateral 07/21/2023    Procedure: CYSTOSCOPY URETEROSCOPY RETROGRADE PYELOGRAM WITH BILATERAL STENT URETERAL EXCHANGE; LEFT LASER LITHOTRIPSY AND STONE BASKET RETRIEVAL;  Surgeon: Krish Espana Erna Benavidez MD;  [facility];  Service: Urology   • VT CYSTO/URETERO W/LITHOTRIPSY &INDWELL STENT INSRT Bilateral 01/23/2023    Procedure: CYSTOSCOPY  RIGHT URETEROSCOPY WITH LITHOTRIPSY HOLMIUM LASER, BILATERAL RETROGRADE PYELOGRAM AND BILATERAL  URETERAL STENT INSERTION;  Surgeon: Jamaal Dumont MD;  [facility];  Service: Urology   • VT CYSTO/URETERO W/LITHOTRIPSY &INDWELL STENT INSRT Right 04/03/2023    Procedure: RIGHT URETEROSCOPY WITH LITHOTRIPSY HOLMIUM LASER, RETROGRADE PYELOGRAM; BILATERAL EXCHANGE STENT URETERAL;  Surgeon: Jamaal Dumont MD;  [facility];  Service: Urology   • VT INSJ TUNNELED CTR VAD W/SUBQ PORT AGE 5 YR/> N/A 03/21/2023    Procedure: INSERTION VENOUS PORT ( PORT-A-CATH) IR;  Surgeon: Nathaniel Zarate DO;  [facility];  Service: Interventional Radiology   • VT LAPS COLECTOMY PRTL W/COLOPXTSTMY LW ANAST N/A 8/17/2023    Procedure: RESECTION COLON LOW ANTERIOR LAPAROSCOPIC WITH ROBOTICS;  Surgeon: Arabella Mckeon MD;  Location[facility];  Service: Colorectal   • TONSILLECTOMY     • URINARY SURGERY Bilateral     bilateral stents     Social History   Social History     Substance and Sexual Activity   Alcohol Use Never     Social History     Substance and Sexual Activity   Drug Use Never     Social History     Tobacco Use   Smoking Status Never   • Passive exposure: Past   Smokeless Tobacco Never     Family History   Problem Relation Age of Onset   • Cancer Mother    • Cirrhosis Father    • Breast cancer Neg Hx    • Breast cancer additional onset Neg Hx        Meds/Allergies   all current active meds have been reviewed, current meds:   Current Facility-Administered Medications   Medication Dose Route Frequency   • heparin (porcine) subcutaneous injection 5,000 Units  5,000 Units Subcutaneous Q8H [address]   • lactated ringers infusion  125 mL/hr Intravenous Continuous   • potassium chloride 20 mEq IVPB (premix)  20 mEq Intravenous Once and PTA meds:   Prior to Admission Medications   Prescriptions Last Dose Informant Patient Reported? Taking?   acetaminophen (TYLENOL) 500 mg tablet  Self No No   Sig: Take 2 tablets (1,000 mg total) by mouth 3 (three) times a day   amLODIPine (NORVASC) 10 mg tablet  Self No No   Sig: Take 1 tablet (10 mg total) by mouth daily   Patient taking differently: Take 20 mg by mouth daily   cyanocobalamin (VITAMIN B-12) 1000 MCG tablet  Self Yes No   Sig: Take 1,000 mcg by mouth daily   enoxaparin (LOVENOX) 40 mg/0.4 mL   No No   Sig: Inject 0.4 mL (40 mg total) under the skin every 24 hours for 24 days   gabapentin (Neurontin) 100 mg capsule   No No   Sig: Take 2 capsules (200 mg total) by mouth 2 (two) times a day To reduce shingles pain   methocarbamol (ROBAXIN) 500 mg tablet   No No   Sig: Take 1 tablet (500 mg total) by mouth every 8 (eight) hours for 5 days   morphine (MSIR) 15 mg tablet   No No   Sig: Take 0.5-1 tablets (7.5-15 mg total) by mouth every 4 (four) hours as needed for severe pain or moderate pain for up to 15 days Max Daily Amount: 90 mg   oxybutynin (DITROPAN) 5 mg tablet   No No   Sig: Take 1 tablet (5 mg total) by mouth every 6 (six) hours as needed (bladder spasms)   potassium chloride (MICRO-K) 10 MEQ CR capsule   No No   Sig: Take 4 capsules (40 mEq total) by mouth 2 (two) times a day   predniSONE 10 mg tablet  Self No No   Sig: Take 4 tablets (40 mg total) by mouth daily Please take for 4 weeks and then decrease by 5 mg increments every 2 weeks. Patient taking differently: Take 30 mg by mouth daily Please take for 4 weeks and then decrease by 5 mg increments every 2 weeks.  -- Taking 20mg and then 10mg - titrating   ursodiol (ACTIGALL) 300 mg capsule  Self No No   Sig: Take 3 capsules (900 mg total) by mouth in the morning      Facility-Administered Medications: None     Allergies   Allergen Reactions   • Oxaliplatin Shortness Of Breath     Reactions occurred with 2nd and 3rd infusions (about 1-3 hours from initiation of infusion) and required treatment with steroids and antihistamines. Please refer to allergy note on 2/7/2023 for detailed description of her reactions. Lab Results: I have personally reviewed pertinent lab results. , CBC:   Lab Results   Component Value Date    WBC 13.33 (H) 08/24/2023    HGB 9.4 (L) 08/24/2023    HCT 29.6 (L) 08/24/2023    MCV 94 08/24/2023     08/24/2023    RBC 3.15 (L) 08/24/2023    MCH 29.8 08/24/2023    MCHC 31.8 08/24/2023    RDW 15.4 (H) 08/24/2023    MPV 9.1 08/24/2023   , CMP:   Lab Results   Component Value Date    SODIUM 141 08/24/2023    K 3.4 (L) 08/24/2023     08/24/2023    CO2 27 08/24/2023    BUN 14 08/24/2023    CREATININE 0.74 08/24/2023    CALCIUM 8.8 08/24/2023    AST 40 (H) 08/23/2023    ALT 44 08/23/2023    ALKPHOS 346 (H) 08/23/2023    EGFR 91 08/24/2023       Counseling / Coordination of Care  Total floor / unit time spent today 25 minutes. Greater than 50% of total time was spent with the patient and / or family counseling and / or coordination of care.     Consults    ---  Judy Lofton MD  General Surgery PGY-I

## 2023-08-24 NOTE — TELEPHONE ENCOUNTER
----- Message -----   From: Kalpesh Montero RN   Sent: 8/24/2023  10:54 AM EDT   To: Liliana Jin RN     Pt had her surgery on 8/17 and she is now on surveillance, can we set her up for a med onc f/u and CT scan, needs to be 3-4 months from her sched surgical date. Thank you.      Please schedule for CT scan near 12/10/2023 and f/u with Dr Mumtaz Diaz one week after     Will send to [facility] clerical pool to arrange

## 2023-08-24 NOTE — PROGRESS NOTES
POST SURGICAL TREATMENT OUTCOME DISCUSSION SUMMARY    RECTAL CANCER MULTIDISCIPLINARY CASE REVIEW    NAME OF SURGEON: Dr. Zay Sanz    PATIENT NAME/: Mercy Arrington  1965    DATE:  2023        CLINICAL (PRETREATMENT) STAGE ACCORDING TO [employer] CANCER (AJCC) -   T3c, N0      PRETREATMENT CARCINOEMBRYONIC ANTIGEN LEVEL (CEA) -   2023- 1.1      NEOADJUVANT THERAPY BEFORE SURGERY -   YES    TYPE OF NEOADJUVANT THERAPY -   5FU chemo  Concurrent chemo (5FU)/RT X6 wks      NEOADJUVANT THERAPY DATE OF COMPLETION -   2023      SURGICAL PROCEDURE -  Low Anterior Resection (LAR)      DATE OF SURGERY -  2023      FINAL PATHOLOGICAL STAGE OR POSTTHERAPY Y-PATHOLOGICAL STAGE ACCRODING TO AJCC -   YpT2, ypN0      TUMOR REGRESSION GRADE -  Grade 2      MICROSATELLITE INSTABILITY STATUS -  Low risk of MSI-H.       CIRCUMFERENTIAL RESECTION MARGIN -   Negative      DISTAL RESECTION MARGIN -  Negative      MESORECTAL GRADE -   Not applicable.  Tumor at peritonealized portion of the rectum      RECOMMENDATION FOR ADJUVANT THERAPY AND, IF APPLICABLE, ADJUVANT THERAPY REGIMEN -  NO

## 2023-08-24 NOTE — ED NOTES
This RN and RN ROHIT went to attempt NG tube on patient x2. Pt states "I cannot do it. I need to be completely out for this". Pt requesting to speak with surgery doctors.  Surgery resident notified     Ma Koyanagi, [address]  08/24/23 6360

## 2023-08-24 NOTE — ED ATTENDING ATTESTATION
8/23/2023   IJack MD, saw and evaluated the patient. I have discussed the patient with the resident/non-physician practitioner and agree with the resident's/non-physician practitioner's findings, Plan of Care, and MDM as documented in the resident's/non-physician practitioner's note, except where noted. All available labs and Radiology studies were reviewed. I was present for key portions of any procedure(s) performed by the resident/non-physician practitioner and I was immediately available to provide assistance. At this point I agree with the current assessment done in the Emergency Department. I have conducted an independent evaluation of this patient a history and physical is as follows:    Unit/Bed#: ED 02 Encounter: [id number]    Chief Complaint   Patient presents with   • Abdominal Pain     Pt reporting extreme abd pain after surgery this past week     56 y.o. female with recent surgery for rectal cancer, with diverting colostomy presenting with lower abdominal pain and vomiting x 2 days, since discharge from the hospital. No chest pain or shortness of breath. Has had nausea and decreased oral intake. Has had vomiting today. Ostomy output has been more liquid and darker, no archie blood. Patient also had some stool output per rectum. Physical Exam  /70   Pulse 98   Temp 98.4 °F (36.9 °C) (Oral)   Resp 15   SpO2 92%      Vital signs and nursing notes reviewed    CONSTITUTIONAL: female appearing stated age resting in bed, ill appearing  HEENT: atraumatic, normocephalic. Sclera anicteric, conjunctiva are not injected. Moist oral mucosa  CARDIOVASCULAR/CHEST: tachycardic, no M/R/G. 2+ radial pulses. Port in right upper chest.  PULMONARY: Breathing comfortably on RA. Breath sounds are equal and clear to auscultation  ABDOMEN: Distended, high-pitched bowel sounds diffusely. Ostomy in right periumbilical region. Trochar incision sites: right lower is intact without drainage.  Left upper is with dehiscence down to subcutaneous tissue, no drainage or surrounding erythema. Abdomen is tender to palpation diffusely throughout the abdomen. MSK: moves all extremities, no deformities, no peripheral edema, no calf asymmetry  NEURO: Awake, alert, and oriented x 3. Face symmetric. Moves all extremities spontaneously. No focal neurologic deficits  SKIN: Warm, appears well-perfused  MENTAL STATUS: Normal affect      Labs and Imaging  Labs Reviewed   CBC AND DIFFERENTIAL - Abnormal       Result Value Ref Range Status    WBC 14.68 (*) 4.31 - 10.16 Thousand/uL Final    RBC 3.44 (*) 3.81 - 5.12 Million/uL Final    Hemoglobin 10.4 (*) 11.5 - 15.4 g/dL Final    Hematocrit 31.5 (*) 34.8 - 46.1 % Final    MCV 92  82 - 98 fL Final    MCH 30.2  26.8 - 34.3 pg Final    MCHC 33.0  31.4 - 37.4 g/dL Final    RDW 15.3 (*) 11.6 - 15.1 % Final    MPV 9.4  8.9 - 12.7 fL Final    Platelets 572 (*) 270 - 390 Thousands/uL Final    Narrative: This is an appended report. These results have been appended to a previously verified report. COMPREHENSIVE METABOLIC PANEL - Abnormal    Sodium 139  135 - 147 mmol/L Final    Potassium 3.3 (*) 3.5 - 5.3 mmol/L Final    Chloride 98  96 - 108 mmol/L Final    CO2 28  21 - 32 mmol/L Final    ANION GAP 13  mmol/L Final    BUN 17  5 - 25 mg/dL Final    Creatinine 0.77  0.60 - 1.30 mg/dL Final    Comment: Standardized to IDMS reference method    Glucose 213 (*) 65 - 140 mg/dL Final    Comment: If the patient is fasting, the ADA then defines impaired fasting glucose as > 100 mg/dL and diabetes as > or equal to 123 mg/dL. Calcium 9.5  8.4 - 10.2 mg/dL Final    AST 40 (*) 13 - 39 U/L Final    ALT 44  7 - 52 U/L Final    Comment: Specimen collection should occur prior to Sulfasalazine administration due to the potential for falsely depressed results.      Alkaline Phosphatase 346 (*) 34 - 104 U/L Final    Total Protein 7.1  6.4 - 8.4 g/dL Final    Albumin 3.5  3.5 - 5.0 g/dL Final Total Bilirubin 1.25 (*) 0.20 - 1.00 mg/dL Final    Comment: Use of this assay is not recommended for patients undergoing treatment with eltrombopag due to the potential for falsely elevated results. N-acetyl-p-benzoquinone imine (metabolite of Acetaminophen) will generate erroneously low results in samples for patients that have taken an overdose of Acetaminophen.     eGFR 87  ml/min/1.73sq m Final    Narrative:     National Kidney Disease Foundation guidelines for Chronic Kidney Disease (CKD):   •  Stage 1 with normal or high GFR (GFR > 90 mL/min/1.73 square meters)  •  Stage 2 Mild CKD (GFR = 60-89 mL/min/1.73 square meters)  •  Stage 3A Moderate CKD (GFR = 45-59 mL/min/1.73 square meters)  •  Stage 3B Moderate CKD (GFR = 30-44 mL/min/1.73 square meters)  •  Stage 4 Severe CKD (GFR = 15-29 mL/min/1.73 square meters)  •  Stage 5 End Stage CKD (GFR <15 mL/min/1.73 square meters)  Note: GFR calculation is accurate only with a steady state creatinine   LIPASE - Abnormal    Lipase 122 (*) 11 - 82 u/L Final   UA W REFLEX TO MICROSCOPIC WITH REFLEX TO CULTURE - Abnormal    Color, UA Light Yellow   Final    Clarity, UA Turbid   Final    Specific Gravity, UA 1.012  1.003 - 1.030 Final    pH, UA 7.5  4.5, 5.0, 5.5, 6.0, 6.5, 7.0, 7.5, 8.0 Final    Leukocytes, UA Small (*) Negative Final    Nitrite, UA Negative  Negative Final    Protein, UA Trace (*) Negative mg/dl Final    Glucose, UA Trace (*) Negative mg/dl Final    Ketones, UA Negative  Negative mg/dl Final    Urobilinogen, UA <2.0  <2.0 mg/dl mg/dl Final    Bilirubin, UA Negative  Negative Final    Occult Blood, UA Trace (*) Negative Final   URINE MICROSCOPIC - Abnormal    RBC, UA 10-20 (*) None Seen, 1-2 /hpf Final    WBC, UA 2-4 (*) None Seen, 1-2 /hpf Final    Epithelial Cells None Seen  None Seen, Occasional /hpf Final    Bacteria, UA None Seen  None Seen, Occasional /hpf Final   MANUAL DIFFERENTIAL(PHLEBS DO NOT ORDER) - Abnormal    Segmented % 95 (*) 43 - 75 % Final    Bands % 1  0 - 8 % Final    Lymphocytes % 2 (*) 14 - 44 % Final    Monocytes % 1 (*) 4 - 12 % Final    Eosinophils, % 0  0 - 6 % Final    Basophils % 0  0 - 1 % Final    Atypical Lymphocytes % 1 (*) <=0 % Final    Absolute Neutrophils 14.09 (*) 1.85 - 7.62 Thousand/uL Final    Lymphocytes Absolute 0.44 (*) 0.60 - 4.47 Thousand/uL Final    Monocytes Absolute 0.15  0.00 - 1.22 Thousand/uL Final    Eosinophils Absolute 0.00  0.00 - 0.40 Thousand/uL Final    Basophils Absolute 0.00  0.00 - 0.10 Thousand/uL Final    Total Counted     Final    RBC Morphology Present   Final    Platelet Estimate Increased (*) Adequate Final    Giant PLTs Present   Final    Anisocytosis Present   Final    Ovalocytes Present   Final    Poikilocytes Present   Final    Polychromasia Present   Final   LACTIC ACID, PLASMA (W/REFLEX IF RESULT > 2.0) - Normal    LACTIC ACID 1.2  0.5 - 2.0 mmol/L Final    Narrative:     Result may be elevated if tourniquet was used during collection. PROCALCITONIN TEST - Normal    Procalcitonin 0.18  <=0.25 ng/ml Final    Comment: Suspected Lower Respiratory Tract Infection (LRTI):  - LESS than or EQUAL to 0.25 ng/mL:   low likelihood for bacterial LRTI; antibiotics DISCOURAGED.  - GREATER than 0.25 ng/mL:   increased likelihood for bacterial LRTI; antibiotics ENCOURAGED. Suspected Sepsis:  - Strongly consider initiating antibiotics in ALL UNSTABLE patients. - LESS than or EQUAL to 0.5 ng/mL:   low likelihood for bacterial sepsis; antibiotics DISCOURAGED.  - GREATER than 0.5 ng/mL:   increased likelihood for bacterial sepsis; antibiotics ENCOURAGED.  - GREATER than 2 ng/mL:   high risk for severe sepsis / septic shock; antibiotics strongly ENCOURAGED. Decisions on antibiotic use should not be based solely on Procalcitonin (PCT) levels. If PCT is low but uncertainty exists with stopping antibiotics, repeat PCT in 6-24 hours to confirm the low level.  If antibiotics are administered (regardless if initial PCT was high or low), repeat PCT every 1-2 days to consider early antibiotic cessation (when GREATER than 80% decrease from the peak OR when PCT drops below designated cutoffs, whichever comes first), so long as the infection is NOT one that typically requires prolonged treatment durations (e.g., bone/joint infections, endocarditis, Staph. aureus bacteremia). Situations of FALSE-POSITIVE Procalcitonin values:  1) Newborns < 67 hours old  2) Massive stress from severe trauma / burns, major surgery, acute pancreatitis, cardiogenic / hemorrhagic shock, sickle cell crisis, or other organ perfusion abnormalities  3) Malaria and some Candidal infections  4) Treatment with agents that stimulate cytokines (e.g., OKT3, anti-lymphocyte globulins, alemtuzumab, IL-2, granulocyte transfusion [NOT GCSFs])  5) Chronic renal disease causes elevated baseline levels (consider GREATER than 0.75 ng/mL as an abnormal cut-off); initiating HD/CRRT may cause transient decreases  6) Paraneoplastic syndromes from medullary thyroid or SCLC, some forms of vasculitis, and acute uhtne-pz-iokc disease    Situations of FALSE-NEGATIVE Procalcitonin values:  1) Too early in clinical course for PCT to have reached its peak (may repeat in 6-24 hours to confirm low level)  2) Localized infection WITHOUT systemic (SIRS / sepsis) response (e.g., an abscess, osteomyelitis, cystitis)  3) Mycobacteria (e.g., Tuberculosis, MAC)  4) Cystic fibrosis exacerbations     PROTIME-INR - Normal    Protime 13.6  11.6 - 14.5 seconds Final    INR 1.02  0.84 - 1.19 Final   APTT - Normal    PTT 31  23 - 37 seconds Final    Comment: Therapeutic Heparin Range =  60-90 seconds   BLOOD CULTURE    Blood Culture Received in Microbiology Lab. Culture in Progress. Preliminary   BLOOD CULTURE    Blood Culture Received in Microbiology Lab. Culture in Progress.    Preliminary   RBC MORPHOLOGY REFLEX TEST       CT Abdomen pelvis with contrast   Final Result      Multiple loops of abnormally dilated proximal to mid small bowel with relative collapse of the distal small bowel most compatible with small bowel obstruction. A clear transition point is not identified. Surgical consultation is advised. A few scattered tiny foci of free intraperitoneal air are noted likely postoperative in nature in this patient with a history of recent low anterior colon resection with right mid abdominal ileostomy. Small amount of intra-abdominal and pelvic ascites. Stable position of bilateral ureteral stents with no significant hydronephrosis.                   I personally discussed this study with Dr Aliza Landa on 8/23/2023 10:46 PM.            Workstation performed: [id number]               Procedures  Procedures        ED Course  Medications   HYDROmorphone HCl (DILAUDID) injection 0.2 mg (has no administration in time range)   ondansetron (ZOFRAN) injection 4 mg (4 mg Intravenous Given 8/23/23 1838)   HYDROmorphone (DILAUDID) injection 0.5 mg (0.5 mg Intravenous Given 8/23/23 1930)   sodium chloride 0.9 % bolus 1,000 mL (0 mL Intravenous Stopped 8/23/23 2342)   ceftriaxone (ROCEPHIN) 2 g/50 mL in dextrose IVPB (0 mg Intravenous Stopped 8/23/23 2125)   metroNIDAZOLE (FLAGYL) tablet 500 mg (500 mg Oral Given 8/23/23 2042)   iohexol (OMNIPAQUE) 350 MG/ML injection (SINGLE-DOSE) 100 mL (90 mL Intravenous Given 8/23/23 2134)   ondansetron (ZOFRAN) injection 4 mg (4 mg Intravenous Given 8/23/23 2156)

## 2023-08-24 NOTE — ED ATTENDING ATTESTATION
8/23/2023   I, Talisha Soriano MD, saw and evaluated the patient. I have discussed the patient with the resident/non-physician practitioner and agree with the resident's/non-physician practitioner's findings, Plan of Care, and MDM as documented in the resident's/non-physician practitioner's note, except where noted. All available labs and Radiology studies were reviewed. I was present for key portions of any procedure(s) performed by the resident/non-physician practitioner and I was immediately available to provide assistance. At this point I agree with the current assessment done in the Emergency Department. I have conducted an independent evaluation of this patient a history and physical is as follows:    Unit/Bed#: ED 02 Encounter: [id number]    Chief Complaint   Patient presents with   • Abdominal Pain     Pt reporting extreme abd pain after surgery this past week     56 y.o. female with recent surgery for rectal cancer, with diverting colostomy presenting with lower abdominal pain and vomiting x 2 days, since discharge from the hospital. No chest pain or shortness of breath. Has had nausea and decreased oral intake. Has had vomiting today. Ostomy output has been more liquid and darker, no archie blood. Patient also had some stool output per rectum. Physical Exam  /70   Pulse 98   Temp 98.4 °F (36.9 °C) (Oral)   Resp 15   SpO2 92%      Vital signs and nursing notes reviewed    CONSTITUTIONAL: female appearing stated age resting in bed, ill appearing  HEENT: atraumatic, normocephalic. Sclera anicteric, conjunctiva are not injected. Moist oral mucosa  CARDIOVASCULAR/CHEST: tachycardic, no M/R/G. 2+ radial pulses. Port in right upper chest.  PULMONARY: Breathing comfortably on RA. Breath sounds are equal and clear to auscultation  ABDOMEN: Distended, high-pitched bowel sounds diffusely. Ostomy in right periumbilical region. Trochar incision sites: right lower is intact without drainage.  Left upper is with dehiscence down to subcutaneous tissue, no drainage or surrounding erythema. Abdomen is tender to palpation diffusely throughout the abdomen. MSK: moves all extremities, no deformities, no peripheral edema, no calf asymmetry  NEURO: Awake, alert, and oriented x 3. Face symmetric. Moves all extremities spontaneously. No focal neurologic deficits  SKIN: Warm, appears well-perfused  MENTAL STATUS: Normal affect      Labs and Imaging  Labs Reviewed   CBC AND DIFFERENTIAL - Abnormal       Result Value Ref Range Status    WBC 14.68 (*) 4.31 - 10.16 Thousand/uL Final    RBC 3.44 (*) 3.81 - 5.12 Million/uL Final    Hemoglobin 10.4 (*) 11.5 - 15.4 g/dL Final    Hematocrit 31.5 (*) 34.8 - 46.1 % Final    MCV 92  82 - 98 fL Final    MCH 30.2  26.8 - 34.3 pg Final    MCHC 33.0  31.4 - 37.4 g/dL Final    RDW 15.3 (*) 11.6 - 15.1 % Final    MPV 9.4  8.9 - 12.7 fL Final    Platelets 320 (*) 179 - 390 Thousands/uL Final    Narrative: This is an appended report. These results have been appended to a previously verified report. COMPREHENSIVE METABOLIC PANEL - Abnormal    Sodium 139  135 - 147 mmol/L Final    Potassium 3.3 (*) 3.5 - 5.3 mmol/L Final    Chloride 98  96 - 108 mmol/L Final    CO2 28  21 - 32 mmol/L Final    ANION GAP 13  mmol/L Final    BUN 17  5 - 25 mg/dL Final    Creatinine 0.77  0.60 - 1.30 mg/dL Final    Comment: Standardized to IDMS reference method    Glucose 213 (*) 65 - 140 mg/dL Final    Comment: If the patient is fasting, the ADA then defines impaired fasting glucose as > 100 mg/dL and diabetes as > or equal to 123 mg/dL. Calcium 9.5  8.4 - 10.2 mg/dL Final    AST 40 (*) 13 - 39 U/L Final    ALT 44  7 - 52 U/L Final    Comment: Specimen collection should occur prior to Sulfasalazine administration due to the potential for falsely depressed results.      Alkaline Phosphatase 346 (*) 34 - 104 U/L Final    Total Protein 7.1  6.4 - 8.4 g/dL Final    Albumin 3.5  3.5 - 5.0 g/dL Final Total Bilirubin 1.25 (*) 0.20 - 1.00 mg/dL Final    Comment: Use of this assay is not recommended for patients undergoing treatment with eltrombopag due to the potential for falsely elevated results. N-acetyl-p-benzoquinone imine (metabolite of Acetaminophen) will generate erroneously low results in samples for patients that have taken an overdose of Acetaminophen.     eGFR 87  ml/min/1.73sq m Final    Narrative:     National Kid[employer] guidelines for Chronic Kidney Disease (CKD):   •  Stage 1 with normal or high GFR (GFR > 90 mL/min/1.73 square meters)  •  Stage 2 Mild CKD (GFR = 60-89 mL/min/1.73 square meters)  •  Stage 3A Moderate CKD (GFR = 45-59 mL/min/1.73 square meters)  •  Stage 3B Moderate CKD (GFR = 30-44 mL/min/1.73 square meters)  •  Stage 4 Severe CKD (GFR = 15-29 mL/min/1.73 square meters)  •  Stage 5 End Stage CKD (GFR <15 mL/min/1.73 square meters)  Note: GFR calculation is accurate only with a steady state creatinine   LIPASE - Abnormal    Lipase 122 (*) 11 - 82 u/L Final   UA W REFLEX TO MICROSCOPIC WITH REFLEX TO CULTURE - Abnormal    Color, UA Light Yellow   Final    Clarity, UA Turbid   Final    Specific Gravity, UA 1.012  1.003 - 1.030 Final    pH, UA 7.5  4.5, 5.0, 5.5, 6.0, 6.5, 7.0, 7.5, 8.0 Final    Leukocytes, UA Small (*) Negative Final    Nitrite, UA Negative  Negative Final    Protein, UA Trace (*) Negative mg/dl Final    Glucose, UA Trace (*) Negative mg/dl Final    Ketones, UA Negative  Negative mg/dl Final    Urobilinogen, UA <2.0  <2.0 mg/dl mg/dl Final    Bilirubin, UA Negative  Negative Final    Occult Blood, UA Trace (*) Negative Final   URINE MICROSCOPIC - Abnormal    RBC, UA 10-20 (*) None Seen, 1-2 /hpf Final    WBC, UA 2-4 (*) None Seen, 1-2 /hpf Final    Epithelial Cells None Seen  None Seen, Occasional /hpf Final    Bacteria, UA None Seen  None Seen, Occasional /hpf Final   MANUAL DIFFERENTIAL(PHLEBS DO NOT ORDER) - Abnormal    Segmented % 95 (*) 43 - 75 % Final    Bands % 1  0 - 8 % Final    Lymphocytes % 2 (*) 14 - 44 % Final    Monocytes % 1 (*) 4 - 12 % Final    Eosinophils, % 0  0 - 6 % Final    Basophils % 0  0 - 1 % Final    Atypical Lymphocytes % 1 (*) <=0 % Final    Absolute Neutrophils 14.09 (*) 1.85 - 7.62 Thousand/uL Final    Lymphocytes Absolute 0.44 (*) 0.60 - 4.47 Thousand/uL Final    Monocytes Absolute 0.15  0.00 - 1.22 Thousand/uL Final    Eosinophils Absolute 0.00  0.00 - 0.40 Thousand/uL Final    Basophils Absolute 0.00  0.00 - 0.10 Thousand/uL Final    Total Counted     Final    RBC Morphology Present   Final    Platelet Estimate Increased (*) Adequate Final    Giant PLTs Present   Final    Anisocytosis Present   Final    Ovalocytes Present   Final    Poikilocytes Present   Final    Polychromasia Present   Final   LACTIC ACID, PLASMA (W/REFLEX IF RESULT > 2.0) - Normal    LACTIC ACID 1.2  0.5 - 2.0 mmol/L Final    Narrative:     Result may be elevated if tourniquet was used during collection. PROCALCITONIN TEST - Normal    Procalcitonin 0.18  <=0.25 ng/ml Final    Comment: Suspected Lower Respiratory Tract Infection (LRTI):  - LESS than or EQUAL to 0.25 ng/mL:   low likelihood for bacterial LRTI; antibiotics DISCOURAGED.  - GREATER than 0.25 ng/mL:   increased likelihood for bacterial LRTI; antibiotics ENCOURAGED. Suspected Sepsis:  - Strongly consider initiating antibiotics in ALL UNSTABLE patients. - LESS than or EQUAL to 0.5 ng/mL:   low likelihood for bacterial sepsis; antibiotics DISCOURAGED.  - GREATER than 0.5 ng/mL:   increased likelihood for bacterial sepsis; antibiotics ENCOURAGED.  - GREATER than 2 ng/mL:   high risk for severe sepsis / septic shock; antibiotics strongly ENCOURAGED. Decisions on antibiotic use should not be based solely on Procalcitonin (PCT) levels. If PCT is low but uncertainty exists with stopping antibiotics, repeat PCT in 6-24 hours to confirm the low level.  If antibiotics are administered (regardless if initial PCT was high or low), repeat PCT every 1-2 days to consider early antibiotic cessation (when GREATER than 80% decrease from the peak OR when PCT drops below designated cutoffs, whichever comes first), so long as the infection is NOT one that typically requires prolonged treatment durations (e.g., bone/joint infections, endocarditis, Staph. aureus bacteremia). Situations of FALSE-POSITIVE Procalcitonin values:  1) Newborns < 67 hours old  2) Massive stress from severe trauma / burns, major surgery, acute pancreatitis, cardiogenic / hemorrhagic shock, sickle cell crisis, or other organ perfusion abnormalities  3) Malaria and some Candidal infections  4) Treatment with agents that stimulate cytokines (e.g., OKT3, anti-lymphocyte globulins, alemtuzumab, IL-2, granulocyte transfusion [NOT GCSFs])  5) Chronic renal disease causes elevated baseline levels (consider GREATER than 0.75 ng/mL as an abnormal cut-off); initiating HD/CRRT may cause transient decreases  6) Paraneoplastic syndromes from medullary thyroid or SCLC, some forms of vasculitis, and acute [id number] disease    Situations of FALSE-NEGATIVE Procalcitonin values:  1) Too early in clinical course for PCT to have reached its peak (may repeat in 6-24 hours to confirm low level)  2) Localized infection WITHOUT systemic (SIRS / sepsis) response (e.g., an abscess, osteomyelitis, cystitis)  3) Mycobacteria (e.g., Tuberculosis, MAC)  4) Cystic fibrosis exacerbations     PROTIME-INR - Normal    Protime 13.6  11.6 - 14.5 seconds Final    INR 1.02  0.84 - 1.19 Final   APTT - Normal    PTT 31  23 - 37 seconds Final    Comment: Therapeutic Heparin Range =  60-90 seconds   BLOOD CULTURE    Blood Culture Received in Microbiology Lab. Culture in Progress. Preliminary   BLOOD CULTURE    Blood Culture Received in Microbiology Lab. Culture in Progress.    Preliminary   RBC MORPHOLOGY REFLEX TEST       CT Abdomen pelvis with contrast   Final Result      Multiple loops of abnormally dilated proximal to mid small bowel with relative collapse of the distal small bowel most compatible with small bowel obstruction. A clear transition point is not identified. Surgical consultation is advised. A few scattered tiny foci of free intraperitoneal air are noted likely postoperative in nature in this patient with a history of recent low anterior colon resection with right mid abdominal ileostomy. Small amount of intra-abdominal and pelvic ascites. Stable position of bilateral ureteral stents with no significant hydronephrosis.                   I personally discussed this study with Dr Gia Peoples on 8/23/2023 10:46 PM.            Workstation performed: [id number]               Procedures  Procedures        ED Course  Medications   HYDROmorphone HCl (DILAUDID) injection 0.2 mg (has no administration in time range)   ondansetron (ZOFRAN) injection 4 mg (4 mg Intravenous Given 8/23/23 1838)   HYDROmorphone (DILAUDID) injection 0.5 mg (0.5 mg Intravenous Given 8/23/23 1930)   sodium chloride 0.9 % bolus 1,000 mL (0 mL Intravenous Stopped 8/23/23 2342)   ceftriaxone (ROCEPHIN) 2 g/50 mL in dextrose IVPB (0 mg Intravenous Stopped 8/23/23 2125)   metroNIDAZOLE (FLAGYL) tablet 500 mg (500 mg Oral Given 8/23/23 2042)   iohexol (OMNIPAQUE) 350 MG/ML injection (SINGLE-DOSE) 100 mL (90 mL Intravenous Given 8/23/23 2134)   ondansetron (ZOFRAN) injection 4 mg (4 mg Intravenous Given 8/23/23 2156) surrogate and discussions with consultants            ED Course  Medications   HYDROmorphone HCl (DILAUDID) injection 0.2 mg (has no administration in time range)   ondansetron (ZOFRAN) injection 4 mg (4 mg Intravenous Given 8/23/23 1838)   HYDROmorphone (DILAUDID) injection 0.5 mg (0.5 mg Intravenous Given 8/23/23 1930)   sodium chloride 0.9 % bolus 1,000 mL (0 mL Intravenous Stopped 8/23/23 2342)   ceftriaxone (ROCEPHIN) 2 g/50 mL in dextrose IVPB (0 mg Intravenous Stopped 8/23/23 2125)   metroNIDAZOLE (FLAGYL) tablet 500 mg (500 mg Oral Given 8/23/23 2042)   iohexol (OMNIPAQUE) 350 MG/ML injection (SINGLE-DOSE) 100 mL (90 mL Intravenous Given 8/23/23 2134)   ondansetron (ZOFRAN) injection 4 mg (4 mg Intravenous Given 8/23/23 246)     80-year-old female presenting with abdominal pain, nausea, and vomiting in setting of recent rectal cancer surgery with diverting colostomy. Differential diagnosis includes postoperative complication such as dehiscence, seroma, abscess, bowel obstruction, pancreatitis, cholecystitis, versus another etiology of symptoms. Labs obtained, revealing CMP with mild hypokalemia of 3.3, baseline renal function, mildly elevated lipase of 122 which does not quite reach criteria for acute pancreatitis, normal lactate, normal procalcitonin, CBC was leukocytosis of 14.68, hemoglobin of 10.4 which is improved from prior of 8.5 on 8/19, thrombocytosis of 452 likely is an acute phase reactant, UA is overall not consistent with UTI. Given that infection is on differential, empiric antibiotics of ceftriaxone and Flagyl administered. CT imaging reveals findings concerning for small bowel obstruction with other findings as above. Patient treated with antiemetics, analgesics. Patient admitted to colorectal surgery for further evaluation and care.

## 2023-08-24 NOTE — PROGRESS NOTES
RECTAL/GI MULTIDISCIPLINARY CASE REVIEW  POST OP RECTAL CANCER PATIENT    DATE: 8/24/2023      PRESENTING DOCTOR: Dr. Karen Fernandes      DIAGNOSIS: Rectal cancer      Kel Vernon was presented at the Rectal/GI Multidisciplinary Conference today. PATHOLOGY REVIEWED POST OP REPORT:  YES     PATHOLOGY PHOTOGRAPHS REVIEWED (LATERAL, POSTERIOR, ANTERIOR):  All angles viewed    PRE TREATMENT STAGING:  T3c, N0    TOTAL NEOADJUVANT TREATMENT COMPLETE ON:  Completed on 5/27/2023    POST SURGICAL STAGING:  YpT2, ypN0    SURGICAL DATE:  8/17/2023    SURGERY PERFORMED:  Low Anterior Resection (LAR)    NEED FOR ADJUVANT TREATMENT:  No    SURVEILLANCE APPOINTMENTS MADE:  Surveillance appointments need to be scheduled with medical oncology and for CT scan. PHYSICIAN RECOMMENDED PLAN:    -Surveillance. Patient will follow up with medical oncology and repeat CT scan. Team agreed to plan. The final treatment plan will be left to the discretion of the patient and the treating physician. DISCLAIMERS:  TO THE TREATING PHYSICIAN:  This conference is a meeting of clinicians from various specialty areas who evaluate and discuss patients for whom a multidisciplinary treatment approach is being considered. Please note that the above opinion was a consensus of the conference attendees and is intended only to assist in quality care of the discussed patient. The responsibility for follow up on the input given during the conference, along with any final decisions regarding plan of care, is that of the patient and the patient's provider. Accordingly, appointments have only been recommended based on this information and have NOT been scheduled unless otherwise noted. TO THE PATIENT:  This summary is a brief record of major aspects of your cancer treatment. You may choose to share a copy with any of your doctors or nurses. However, this is not a detailed or comprehensive record of your care.       NCCN guidelines were readily available for review at this discussion

## 2023-08-25 ENCOUNTER — HOME CARE VISIT (OUTPATIENT)
Dept: HOME HEALTH SERVICES | Facility: HOME HEALTHCARE | Age: 58
End: 2023-08-25
Payer: COMMERCIAL

## 2023-08-25 ENCOUNTER — APPOINTMENT (INPATIENT)
Dept: RADIOLOGY | Facility: HOSPITAL | Age: 58
End: 2023-08-25
Payer: COMMERCIAL

## 2023-08-25 LAB
ANION GAP SERPL CALCULATED.3IONS-SCNC: 10 MMOL/L
BUN SERPL-MCNC: 18 MG/DL (ref 5–25)
CALCIUM SERPL-MCNC: 8.5 MG/DL (ref 8.4–10.2)
CHLORIDE SERPL-SCNC: 107 MMOL/L (ref 96–108)
CO2 SERPL-SCNC: 25 MMOL/L (ref 21–32)
CREAT SERPL-MCNC: 0.8 MG/DL (ref 0.6–1.3)
ERYTHROCYTE [DISTWIDTH] IN BLOOD BY AUTOMATED COUNT: 15.6 % (ref 11.6–15.1)
GFR SERPL CREATININE-BSD FRML MDRD: 83 ML/MIN/1.73SQ M
GLUCOSE SERPL-MCNC: 130 MG/DL (ref 65–140)
GLUCOSE SERPL-MCNC: 82 MG/DL (ref 65–140)
HCT VFR BLD AUTO: 28.6 % (ref 34.8–46.1)
HGB BLD-MCNC: 8.9 G/DL (ref 11.5–15.4)
MAGNESIUM SERPL-MCNC: 1.8 MG/DL (ref 1.9–2.7)
MCH RBC QN AUTO: 29.9 PG (ref 26.8–34.3)
MCHC RBC AUTO-ENTMCNC: 31.1 G/DL (ref 31.4–37.4)
MCV RBC AUTO: 96 FL (ref 82–98)
NRBC BLD AUTO-RTO: 0 /100 WBCS
PLATELET # BLD AUTO: 340 THOUSANDS/UL (ref 149–390)
PMV BLD AUTO: 8.9 FL (ref 8.9–12.7)
POTASSIUM SERPL-SCNC: 3.1 MMOL/L (ref 3.5–5.3)
RBC # BLD AUTO: 2.98 MILLION/UL (ref 3.81–5.12)
SODIUM SERPL-SCNC: 142 MMOL/L (ref 135–147)
WBC # BLD AUTO: 10.87 THOUSAND/UL (ref 4.31–10.16)

## 2023-08-25 PROCEDURE — 85027 COMPLETE CBC AUTOMATED: CPT

## 2023-08-25 PROCEDURE — 83735 ASSAY OF MAGNESIUM: CPT | Performed by: PHYSICIAN ASSISTANT

## 2023-08-25 PROCEDURE — 82948 REAGENT STRIP/BLOOD GLUCOSE: CPT

## 2023-08-25 PROCEDURE — 74018 RADEX ABDOMEN 1 VIEW: CPT

## 2023-08-25 PROCEDURE — 99024 POSTOP FOLLOW-UP VISIT: CPT | Performed by: COLON & RECTAL SURGERY

## 2023-08-25 PROCEDURE — 80048 BASIC METABOLIC PNL TOTAL CA: CPT | Performed by: PHYSICIAN ASSISTANT

## 2023-08-25 RX ORDER — POTASSIUM CHLORIDE 20MEQ/15ML
40 LIQUID (ML) ORAL ONCE
Status: DISCONTINUED | OUTPATIENT
Start: 2023-08-25 | End: 2023-08-25

## 2023-08-25 RX ORDER — POTASSIUM CHLORIDE 14.9 MG/ML
20 INJECTION INTRAVENOUS ONCE
Status: DISCONTINUED | OUTPATIENT
Start: 2023-08-25 | End: 2023-08-25

## 2023-08-25 RX ORDER — MAGNESIUM SULFATE HEPTAHYDRATE 40 MG/ML
2 INJECTION, SOLUTION INTRAVENOUS ONCE
Status: COMPLETED | OUTPATIENT
Start: 2023-08-25 | End: 2023-08-25

## 2023-08-25 RX ORDER — POTASSIUM CHLORIDE 20MEQ/15ML
20 LIQUID (ML) ORAL ONCE
Status: DISCONTINUED | OUTPATIENT
Start: 2023-08-25 | End: 2023-08-25

## 2023-08-25 RX ORDER — POTASSIUM CHLORIDE 20MEQ/15ML
20 LIQUID (ML) ORAL ONCE
Status: COMPLETED | OUTPATIENT
Start: 2023-08-25 | End: 2023-08-25

## 2023-08-25 RX ORDER — POTASSIUM CHLORIDE 20MEQ/15ML
40 LIQUID (ML) ORAL ONCE
Status: COMPLETED | OUTPATIENT
Start: 2023-08-25 | End: 2023-08-25

## 2023-08-25 RX ADMIN — HYDROCORTISONE SODIUM SUCCINATE 50 MG: 100 INJECTION, POWDER, FOR SOLUTION INTRAMUSCULAR; INTRAVENOUS at 13:00

## 2023-08-25 RX ADMIN — HEPARIN SODIUM 5000 UNITS: 5000 INJECTION INTRAVENOUS; SUBCUTANEOUS at 21:38

## 2023-08-25 RX ADMIN — ONDANSETRON 4 MG: 2 INJECTION INTRAMUSCULAR; INTRAVENOUS at 13:17

## 2023-08-25 RX ADMIN — ONDANSETRON 4 MG: 2 INJECTION INTRAMUSCULAR; INTRAVENOUS at 09:33

## 2023-08-25 RX ADMIN — PHENOL 1 SPRAY: 1.5 LIQUID ORAL at 04:13

## 2023-08-25 RX ADMIN — HEPARIN SODIUM 5000 UNITS: 5000 INJECTION INTRAVENOUS; SUBCUTANEOUS at 05:51

## 2023-08-25 RX ADMIN — PHENOL 1 SPRAY: 1.5 LIQUID ORAL at 01:54

## 2023-08-25 RX ADMIN — HYDROCORTISONE SODIUM SUCCINATE 50 MG: 100 INJECTION, POWDER, FOR SOLUTION INTRAMUSCULAR; INTRAVENOUS at 21:38

## 2023-08-25 RX ADMIN — ONDANSETRON 4 MG: 2 INJECTION INTRAMUSCULAR; INTRAVENOUS at 01:51

## 2023-08-25 RX ADMIN — DIATRIZOATE MEGLUMINE AND DIATRIZOATE SODIUM 120 ML: 660; 100 LIQUID ORAL; RECTAL at 15:13

## 2023-08-25 RX ADMIN — PHENOL 1 SPRAY: 1.5 LIQUID ORAL at 06:05

## 2023-08-25 RX ADMIN — HYDROMORPHONE HYDROCHLORIDE 0.2 MG: 0.2 INJECTION, SOLUTION INTRAMUSCULAR; INTRAVENOUS; SUBCUTANEOUS at 09:33

## 2023-08-25 RX ADMIN — SODIUM CHLORIDE, SODIUM LACTATE, POTASSIUM CHLORIDE, AND CALCIUM CHLORIDE 125 ML/HR: .6; .31; .03; .02 INJECTION, SOLUTION INTRAVENOUS at 04:11

## 2023-08-25 RX ADMIN — HYDROMORPHONE HYDROCHLORIDE 0.2 MG: 0.2 INJECTION, SOLUTION INTRAMUSCULAR; INTRAVENOUS; SUBCUTANEOUS at 01:46

## 2023-08-25 RX ADMIN — HYDROMORPHONE HYDROCHLORIDE 0.2 MG: 0.2 INJECTION, SOLUTION INTRAMUSCULAR; INTRAVENOUS; SUBCUTANEOUS at 05:52

## 2023-08-25 RX ADMIN — MAGNESIUM SULFATE HEPTAHYDRATE 2 G: 40 INJECTION, SOLUTION INTRAVENOUS at 16:02

## 2023-08-25 RX ADMIN — SODIUM CHLORIDE, SODIUM LACTATE, POTASSIUM CHLORIDE, AND CALCIUM CHLORIDE 125 ML/HR: .6; .31; .03; .02 INJECTION, SOLUTION INTRAVENOUS at 21:37

## 2023-08-25 RX ADMIN — ONDANSETRON 4 MG: 2 INJECTION INTRAMUSCULAR; INTRAVENOUS at 05:51

## 2023-08-25 RX ADMIN — POTASSIUM CHLORIDE 20 MEQ: 1.5 SOLUTION ORAL at 16:34

## 2023-08-25 RX ADMIN — POTASSIUM CHLORIDE 40 MEQ: 1.5 SOLUTION ORAL at 15:23

## 2023-08-25 RX ADMIN — HYDROMORPHONE HYDROCHLORIDE 0.2 MG: 0.2 INJECTION, SOLUTION INTRAMUSCULAR; INTRAVENOUS; SUBCUTANEOUS at 13:17

## 2023-08-25 RX ADMIN — HEPARIN SODIUM 5000 UNITS: 5000 INJECTION INTRAVENOUS; SUBCUTANEOUS at 13:01

## 2023-08-25 NOTE — PROGRESS NOTES
General Surgery  Progress Note   Manan Hanson 56 y.o. female MRN: [id number]  Unit/Bed#: [facility] 930-01 Encounter: [id number]    Assessment:  56y.o. year old female s/p  LAR, colostomy reversal, diverting loop ileostomy for rectal adenocarcinoma. Discharged . Presented now with small bowel obstruction on CT without clear transition point. AVSS  UOP: 900 (0.5 cc/kg/hr)  NG tube: 400 billious    WBC pending (13.3)  Hgb pending (9.4)  Cr pending (0.74)    Plan:  -Gatsrografin swallow study  -NPO/NG  -Fluids:   -Monitor abdominal exam  -Pain/ nausea control PRN  -OOB/ ambulation  -Incentive Spirometry      Subjective/Objective     Subjective:   Patient seen and examined at bedside, in no acute distress. No acute events overnight. Patient describes diffuse abdominal pain. She says she has some nausea but no vomiting. Passing flatus. Describes a lot of discomfort with the NG tube. Objective:   Vitals:Blood pressure 130/71, pulse 105, temperature 99.2 °F (37.3 °C), resp. rate 20, height 5' 2" (1.575 m), weight 73.2 kg (161 lb 4.8 oz), SpO2 90 %.   Temp (24hrs), Av.7 °F (37.6 °C), Min:99.2 °F (37.3 °C), Max:100.1 °F (37.8 °C)      I/O        0701   0700  0701   0700    I.V. (mL/kg)  2177.1 (29.7)    NG/GT  0    Total Intake(mL/kg)  2177.1 (29.7)    Urine (mL/kg/hr) 700 900 (0.5)    Emesis/NG output 100 50    Stool  0    Total Output 800 950    Net -800 +1227.1          Unmeasured Stool Occurrence  1 x          Invasive Devices     Central Venous Catheter Line  Duration           Port A Cath 23 Right Internal jugular 156 days          Peripheral Intravenous Line  Duration           Peripheral IV 23 Left Antecubital 1 day          Drain  Duration           Ureteral Internal Stent Left ureter 6 Fr. 34 days    Ureteral Internal Stent Right ureter 7 Fr. 34 days    Ileostomy Loop RLQ 7 days    NG/OG/Enteral Tube Nasogastric 18 Fr Right nare 1 day                Physical Exam:  General: No acute distress  Neuro: Awake, Alert and Oriented x 3  HEENT:  Normocephalic, atraumatic, moist mucous membranes  CV: Regular rate and rhythm  Lungs: Normal work of breathing, no increased respiratory effort  Abdomen: Soft, non distended, mildly tender  Extremities: No edema, clubbing or cyanosis  Skin: Warm, dry    Lab Results: BMP/CMP: No results found for: "SODIUM", "K", "CL", "CO2", "ANIONGAP", "BUN", "CREATININE", "GLUCOSE", "CALCIUM", "AST", "ALT", "ALKPHOS", "PROT", "BILITOT", "EGFR" and CBC: No results found for: "WBC", "HGB", "HCT", "MCV", "PLT", "ADJUSTEDWBC", "RBC", "MCH", "MCHC", "RDW", "MPV", "NRBC"  VTE Prophylaxis: Enoxaparin (Lovenox)        Sj Gaines  8/25/2023  5:56 AM

## 2023-08-25 NOTE — PLAN OF CARE
Problem: Potential for Falls  Goal: Patient will remain free of falls  Description: INTERVENTIONS:  - Educate patient/family on patient safety including physical limitations  - Instruct patient to call for assistance with activity   - Consult OT/PT to assist with strengthening/mobility   - Keep Call bell within reach  - Keep bed low and locked with side rails adjusted as appropriate  - Keep care items and personal belongings within reach  - Initiate and maintain comfort rounds  - Make Fall Risk Sign visible to staff  - Offer Toileting every 2 Hours, in advance of need  - Initiate/Maintain bed alarm  - Obtain necessary fall risk management equipment  - Apply yellow socks and bracelet for high fall risk patients  - Consider moving patient to room near nurses station  Outcome: Progressing     Problem: MOBILITY - ADULT  Goal: Maintain or return to baseline ADL function  Description: INTERVENTIONS:  -  Assess patient's ability to carry out ADLs; assess patient's baseline for ADL function and identify physical deficits which impact ability to perform ADLs (bathing, care of mouth/teeth, toileting, grooming, dressing, etc.)  - Assess/evaluate cause of self-care deficits   - Assess range of motion  - Assess patient's mobility; develop plan if impaired  - Assess patient's need for assistive devices and provide as appropriate  - Encourage maximum independence but intervene and supervise when necessary  - Involve family in performance of ADLs  - Assess for home care needs following discharge   - Consider OT consult to assist with ADL evaluation and planning for discharge  - Provide patient education as appropriate  Outcome: Progressing  Goal: Maintains/Returns to pre admission functional level  Description: INTERVENTIONS:  - Perform BMAT or MOVE assessment daily.   - Set and communicate daily mobility goal to care team and patient/family/caregiver.    - Collaborate with rehabilitation services on mobility goals if consulted  - Ambulate patient 3 times a day  - Out of bed to chair 3 times a day   - Out of bed for meals 3 times a day  - Out of bed for toileting  - Record patient progress and toleration of activity level   Outcome: Progressing

## 2023-08-25 NOTE — PLAN OF CARE
Problem: Potential for Falls  Goal: Patient will remain free of falls  Description: INTERVENTIONS:  - Educate patient/family on patient safety including physical limitations  - Instruct patient to call for assistance with activity   - Consult OT/PT to assist with strengthening/mobility   - Keep Call bell within reach  - Keep bed low and locked with side rails adjusted as appropriate  - Keep care items and personal belongings within reach  - Initiate and maintain comfort rounds  - Make Fall Risk Sign visible to staff  - Offer Toileting every 2 Hours, in advance of need  - Initiate/Maintain alarm  - Obtain necessary fall risk management equipment:   - Apply yellow socks and bracelet for high fall risk patients  - Consider moving patient to room near nurses station  Outcome: Progressing     Problem: MOBILITY - ADULT  Goal: Maintain or return to baseline ADL function  Description: INTERVENTIONS:  -  Assess patient's ability to carry out ADLs; assess patient's baseline for ADL function and identify physical deficits which impact ability to perform ADLs (bathing, care of mouth/teeth, toileting, grooming, dressing, etc.)  - Assess/evaluate cause of self-care deficits   - Assess range of motion  - Assess patient's mobility; develop plan if impaired  - Assess patient's need for assistive devices and provide as appropriate  - Encourage maximum independence but intervene and supervise when necessary  - Involve family in performance of ADLs  - Assess for home care needs following discharge   - Consider OT consult to assist with ADL evaluation and planning for discharge  - Provide patient education as appropriate  Outcome: Progressing  Goal: Maintains/Returns to pre admission functional level  Description: INTERVENTIONS:  - Perform BMAT or MOVE assessment daily.   - Set and communicate daily mobility goal to care team and patient/family/caregiver.    - Collaborate with rehabilitation services on mobility goals if consulted  - Perform Range of Motion 3 times a day. - Reposition patient every 2 hours.   - Dangle patient 3 times a day  - Stand patient 2 times a day  - Ambulate patient 2 times a day  - Out of bed to chair 2 times a day   - Out of bed for meals 3 times a day  - Out of bed for toileting  - Record patient progress and toleration of activity level   Outcome: Progressing

## 2023-08-25 NOTE — NURSING NOTE
Patient refusing the administration of IV potassium. [facility] surgery notified via [facility] text.

## 2023-08-25 NOTE — UTILIZATION REVIEW
Initial Clinical Review    Admission: Date/Time/Statement:   Admission Orders (From admission, onward)     Ordered        08/24/23 0141  INPATIENT ADMISSION  Once                      Orders Placed This Encounter   Procedures   • INPATIENT ADMISSION     Standing Status:   Standing     Number of Occurrences:   1     Order Specific Question:   Level of Care     Answer:   Med Surg [16]     Order Specific Question:   Estimated length of stay     Answer:   More than 2 Midnights     Order Specific Question:   Certification     Answer:   I certify that inpatient services are medically necessary for this patient for a duration of greater than two midnights. See H&P and MD Progress Notes for additional information about the patient's course of treatment. ED Arrival Information     Expected   -    Arrival   8/23/2023 18:17    Acuity   Emergent            Means of arrival   Walk-In    Escorted by   Family Member    Service   Colorectal    Admission type   Emergency            Arrival complaint   Weakness           Chief Complaint   Patient presents with   • Abdominal Pain     Pt reporting extreme abd pain after surgery this past week       Initial Presentation: 56 y.o. female with h/o rectal adenocarcinoma s/p 8/17 LAR colostomy reversal, diverting loop ileostomy presents to ED as walk-in with N/V, abdominal pain and found to have SBO without clear transition point. Reports feeling weakness, decreased appetite, nausea, vomiting, reflux. She endorses passing flatus and having bms both per bag and per rectum. Denies fever or chills. Tachycardic, abdomen mildly distended with tenderness. WBC 13.33, Hgb 9.4, Hct 29.6, K 3.4, Mag 1.8, lipase 122. UA + tr protein and blood. ADMIT INPATIENT to M/S UNIT with SBO -- npo, NG tube. Continue to monitor abd exam and bowel function. Pain/nasuea control prn. Palliative care consulted. Replete electrolytes and monitor. SCDs. Encourage incentive spirometry and OOB/ambulate.      Date: 8/25   Day 2: No acute events overnight. Pt describes diffuse abdominal pain. She says she has some nausea but no vomiting. Passing flatus. Describes a lot of discomfort with the NG tube. Gatsrografin swallow study. Continue NPO/NG tube. IVFs: . Pain/ nausea control PRN. OOB/ ambulation, Incentive Spirometry. SCDs.        ED Triage Vitals [08/23/23 1835]   Temperature Pulse Respirations Blood Pressure SpO2   98.4 °F (36.9 °C) (!) 120 20 167/81 99 %      Temp Source Heart Rate Source Patient Position - Orthostatic VS BP Location FiO2 (%)   Oral Monitor Sitting Right arm --      Pain Score       5          Wt Readings from Last 1 Encounters:   08/24/23 73.2 kg (161 lb 4.8 oz)     Additional Vital Signs:   Date/Time Temp Pulse Resp BP MAP (mmHg) SpO2 O2 Device Patient Position - Orthostatic VS   08/25/23 0900 -- -- -- -- -- -- None (Room air) --   08/25/23 07:47:04 99.4 °F (37.4 °C) 108 Abnormal  17 131/71 91 90 % -- --   08/24/23 22:54:14 99.2 °F (37.3 °C) 105 20 130/71 91 90 % -- --   08/24/23 2000 -- -- -- -- -- 91 % None (Room air) --   08/24/23 18:39:36 100.1 °F (37.8 °C) -- -- 128/70 89 -- -- --   08/24/23 1800 -- 100 14 138/77 99 94 % None (Room air) Lying   08/24/23 1700 -- 104 17 133/69 93 93 % None (Room air) Lying   08/24/23 1300 -- 94 18 132/62 89 94 % -- --   08/24/23 0900 -- 96 14 131/60 87 93 % None (Room air) --   08/24/23 0800 -- 96 15 124/58 84 93 % None (Room air) Lying   08/24/23 0545 -- 98 16 134/66 -- 95 % None (Room air) --   08/24/23 0430 -- 98 16 132/68 -- 93 % None (Room air) --   08/24/23 0100 -- 98 15 144/70 101 92 % None (Room air) --   08/24/23 0000 -- 104 16 148/76 105 93 % None (Room air) --   08/23/23 2300 -- 102 18 146/77 104 92 % None (Room air) --   08/23/23 2200 -- 108 Abnormal  18 133/79 101 92 % None (Room air) --   08/23/23 2100 -- 102 18 132/71 95 93 % None (Room air) --   08/23/23 1835 98.4 °F (36.9 °C) 120 Abnormal  20 167/81 116 99 % None (Room air) Sitting     Pertinent Labs/Diagnostic Test Results:   XR chest portable   Final Result by Jessica Valiente MD (08/24 0258)   Suspected subsegmental atelectasis at both lung bases         CT Abdomen pelvis with contrast   Final Result by Nico Gardner MD (08/23 2252)      Multiple loops of abnormally dilated proximal to mid small bowel with relative collapse of the distal small bowel most compatible with small bowel obstruction. A clear transition point is not identified. Surgical consultation is advised. A few scattered tiny foci of free intraperitoneal air are noted likely postoperative in nature in this patient with a history of recent low anterior colon resection with right mid abdominal ileostomy. Small amount of intra-abdominal and pelvic ascites. Stable position of bilateral ureteral stents with no significant hydronephrosis.          XR abdomen 1 view kub    (Results Pending)         Results from last 7 days   Lab Units 08/25/23  0904 08/24/23 0335 08/23/23 1930 08/19/23  0455   WBC Thousand/uL 10.87* 13.33* 14.68* 11.12*   HEMOGLOBIN g/dL 8.9* 9.4* 10.4* 8.5*   HEMATOCRIT % 28.6* 29.6* 31.5* 25.8*   PLATELETS Thousands/uL 340 387 452* 212   NEUTROS ABS Thousands/µL  --   --   --  9.82*   BANDS PCT %  --   --  1  --      Results from last 7 days   Lab Units 08/25/23  0905 08/24/23 0335 08/23/23 1930 08/19/23  0455   SODIUM mmol/L 142 141 139 141   POTASSIUM mmol/L 3.1* 3.4* 3.3* 3.5   CHLORIDE mmol/L 107 104 98 111*   CO2 mmol/L 25 27 28 28   ANION GAP mmol/L 10 10 13 2   BUN mg/dL 18 14 17 11   CREATININE mg/dL 0.80 0.74 0.77 0.72   EGFR ml/min/1.73sq m 83 91 87 94   CALCIUM mg/dL 8.5 8.8 9.5 8.6   MAGNESIUM mg/dL 1.8* 1.8*  --   --    PHOSPHORUS mg/dL  --  3.1  --   --      Results from last 7 days   Lab Units 08/23/23 1930   AST U/L 40*   ALT U/L 44   ALK PHOS U/L 346*   TOTAL PROTEIN g/dL 7.1   ALBUMIN g/dL 3.5   TOTAL BILIRUBIN mg/dL 1.25*     Results from last 7 days   Lab Units 08/21/23  4052 08/21/23  0746 08/20/23  2130 08/20/23  1534 08/20/23  1056 08/20/23  0737 08/19/23  2108 08/19/23  1531 08/19/23  1057 08/18/23  2107 08/18/23  1612 08/18/23  1200   POC GLUCOSE mg/dl 164* 111 162* 270* 210* 111 161* 235* 225* 277* 287* 236*     Results from last 7 days   Lab Units 08/25/23  0905 08/24/23  0335 08/23/23  1930 08/19/23  0455   GLUCOSE RANDOM mg/dL 82 116 213* 112     Results from last 7 days   Lab Units 08/23/23 2036   PROTIME seconds 13.6   INR  1.02   PTT seconds 31     Results from last 7 days   Lab Units 08/23/23 2036   PROCALCITONIN ng/ml 0.18     Results from last 7 days   Lab Units 08/23/23 2036   LACTIC ACID mmol/L 1.2     Results from last 7 days   Lab Units 08/23/23 1930   LIPASE u/L 122*     Results from last 7 days   Lab Units 08/23/23 2038   CLARITY UA  Turbid   COLOR UA  Light Yellow   SPEC GRAV UA  1.012   PH UA  7.5   GLUCOSE UA mg/dl Trace*   KETONES UA mg/dl Negative   BLOOD UA  Trace*   PROTEIN UA mg/dl Trace*   NITRITE UA  Negative   BILIRUBIN UA  Negative   UROBILINOGEN UA (BE) mg/dl <2.0   LEUKOCYTES UA  Small*   WBC UA /hpf 2-4*   RBC UA /hpf 10-20*   BACTERIA UA /hpf None Seen   EPITHELIAL CELLS WET PREP /hpf None Seen     Results from last 7 days   Lab Units 08/23/23 2036   BLOOD CULTURE  No Growth at 24 hrs. No Growth at 24 hrs.        ED Treatment:   Medication Administration from 08/23/2023 1817 to 08/24/2023 1825       Date/Time Order Dose Route Action     08/23/2023 1838 EDT [facility] 4 mg 4 mg Intravenous Given     08/23/2023 1930 EDT HYDROmorphone (DILAUDID) injection 0.5 mg 0.5 mg Intravenous Given     08/23/2023 1930 EDT sodium chloride 0.9 % bolus 1,000 mL 1,000 mL Intravenous New Bag     08/23/2023 2042 EDT ceftriaxone (ROCEPHIN) 2 g/50 mL in dextrose IVPB 2,000 mg Intravenous New Bag     08/23/2023 2042 EDT metroNIDAZOLE (FLAGYL) tablet 500 mg 500 mg Oral Given     08/23/2023 2156 EDT ondansetron (ZOFRAN) injection 4 mg 4 mg Intravenous Given     08/24/2023 0220 EDT HYDROmorphone HCl (DILAUDID) injection 0.2 mg 0.2 mg Intravenous Given     08/24/2023 0301 EDT diazepam (VALIUM) injection 2.5 mg 2.5 mg Intravenous Given     08/24/2023 0305 EDT ondansetron (ZOFRAN) injection 4 mg 4 mg Intravenous Given     08/24/2023 0336 EDT lactated ringers infusion 125 mL/hr Intravenous New Bag     08/24/2023 1336 EDT heparin (porcine) subcutaneous injection 5,000 Units 5,000 Units Subcutaneous Given     08/24/2023 1740 EDT phenol (CHLORASEPTIC) 1.4 % mucosal liquid 1 spray 1 spray Mouth/Throat Given     08/24/2023 1227 EDT phenol (CHLORASEPTIC) 1.4 % mucosal liquid 1 spray 1 spray Mouth/Throat Given     08/24/2023 0931 EDT phenol (CHLORASEPTIC) 1.4 % mucosal liquid 1 spray 1 spray Mouth/Throat Given     08/24/2023 1740 EDT saliva substitute (MOUTH KOTE) mucosal solution 5 spray 5 spray Mouth/Throat Given     08/24/2023 1227 EDT saliva substitute (MOUTH KOTE) mucosal solution 5 spray 5 spray Mouth/Throat Given     08/24/2023 0931 EDT saliva substitute (MOUTH KOTE) mucosal solution 5 spray 5 spray Mouth/Throat Given     08/24/2023 1740 EDT HYDROmorphone HCl (DILAUDID) injection 0.2 mg 0.2 mg Intravenous Given     08/24/2023 1336 EDT HYDROmorphone HCl (DILAUDID) injection 0.2 mg 0.2 mg Intravenous Given     08/24/2023 0933 EDT HYDROmorphone HCl (DILAUDID) injection 0.2 mg 0.2 mg Intravenous Given     Past Medical History:   Diagnosis Date   • Cancer ([address])    • Colon cancer ([address])    • Fall    • Headache    • Hemochromatosis, unspecified    • History of transfusion     2022 - no adverse reaction   • Hypertension    • Kidney calculi    • Kidney stone    • Liver disease     hemangioma   • Muscle weakness    • Personal history of COVID-19 12/2022   • Sarcoidosis    • Seizures ([address])     2022       Admitting Diagnosis: Small bowel obstruction ([address]) [K56.609]  Abdominal pain [R10.9]  Age/Sex: 56 y.o. female  Admission Orders:  Scheduled Medications:  heparin (porcine), 5,000 Units, Subcutaneous, Q8H [address]  potassium chloride, 20 mEq, Intravenous, Once    Continuous IV Infusions:  lactated ringers, 125 mL/hr, Intravenous, Continuous    PRN Meds:  HYDROmorphone, 0.2 mg, Intravenous, Q4H PRN 8/24 x4, 8/25 x3  ondansetron, 4 mg, Intravenous, Q4H PRN 8/24 x1, 8/25 x3  phenol, 1 spray, Mouth/Throat, Q2H PRN 8/24 x3, 8/25 x3  saliva substitute, 5 spray, Mouth/Throat, 4x Daily PRN 8/24 x3        Network Utilization Review Department  ATTENTION: Please call with any questions or concerns to [phone] and carefully listen to the prompts so that you are directed to the right person. All voicemails are confidential.  Ryan Gómez all requests for admission clinical reviews, approved or denied determinations and any other requests to dedicated fax number below belonging to the campus where the patient is receiving treatment.  List of dedicated fax numbers for the Facilities:  [place] DENIALS (Administrative/Medical Necessity) [phone] 230[address] [facility] (Maternity/NICU/Pediatrics) [phone]   [address] [place] [address] Drive [phone]   [facility] [address] [facility] [phone] 1505 [facility] [address] [facility] [address] [place] [address] [address] [place] [address] [phone]   [address] [address] [phone]

## 2023-08-25 NOTE — UTILIZATION REVIEW
NOTIFICATION OF INPATIENT ADMISSION   AUTHORIZATION REQUEST   SERVICING FACILITY:   [address] [place] [address]  Address: [address] [place] [address]  Tax ID: [id number]  NPI: [id number] ATTENDING PROVIDER:  Attending Name and NPI#: Chandra Michele Md [[id number]]  Address: [address] [place] [address] [address] [address]  Phone: [phone]   ADMISSION INFORMATION:  Place of Service: Inpatient [address] [facility] [address]  Place of Service Code: 21  Inpatient Admission Date/Time: 8/24/23  1:41 AM  Discharge Date/Time: No discharge date for patient encounter. Admitting Diagnosis Code/Description:  Small bowel obstruction ([address]) [K56.609]  Abdominal pain [R10.9]     UTILIZATION REVIEW CONTACT:  Dk [facility] Utilization   Network Utilization Review Department  Phone: [phone]  Fax: [phone]  Email: [email or web address]  Contact for approvals/pending authorizations, clinical reviews, and discharge. PHYSICIAN ADVISORY SERVICES:  Medical Necessity Denial & [facility] Review  Phone: [phone]  Fax: [phone]  Email: [email or web address]

## 2023-08-25 NOTE — CASE MANAGEMENT
Case Management Progress Note    Patient name Levon Thorne  Location [facility] 930/[facility] 930-01 MRN [id number]  : 1965 Date 2023       LOS (days): 1  Geometric Mean LOS (GMLOS) (days):   Days to GMLOS:        OBJECTIVE:        Current admission status: Inpatient  Preferred Pharmacy:   [facility]/pharmacy [address] [place] [address] [address]  43[address] [facility] 1200 [facility]  Phone: [phone] Fax: [phone] [facility] ([facility] [address] [facility] [address]  [address] [facility] [address]  Phone: [phone] Fax: [phone]    [address] [facility],[address] [place] [address] - [address] [place] [address]  Phone: [phone] Fax: [phone]    Primary Care Provider: Dee Dee Tomas MD    Primary Insurance: Mello Leija  Secondary Insurance:     PROGRESS NOTE:  Attempted CM assessment, patient requests a later visit as she is in pain. CM to continue to support.

## 2023-08-25 NOTE — PROGRESS NOTES
Progress Note  Socrates Jha 56 y.o. female MRN: [id number]  Unit/Bed#: [facility] 930-01 Encounter: [id number]    Assessment:  56 y.o. F s/p 8/17 LAR, colostomy reversal, diverting loop ileostomy for rectal adenocarcinoma. Discharged 8/21. Presented now with SBO on CT without clear transition point. Subjective:  No acute overnight events. NG tube removed yesterday. Reports abdominal pain (3/10) with cough and ear pain, but is very anxious about it. Tolerating CLD. Reports some nausea, no emesis. Had x1 BM overnight, passing flatus. Voiding, walking. Objective:  Occasionally tachy, high of 118, other VSS on RA. /97   Pulse 93   Temp 98.8 °F (37.1 °C)   Resp 18   Ht 5' 2" (1.575 m)   Wt 73.2 kg (161 lb 4.8 oz)   SpO2 96%   BMI 29.50 kg/m²      Physical Exam:  General: NAD  HENT: NCAT  CV: nl rate  Lungs: nl wob. No resp distress. ABD: Soft, mildly distended, tender in LUQ and RLQ near incisions. Incisions CDI. Ostomy productive.   Extrem: No CCE  Neuro: alert & oriented    8/25 gastrograffin challenge thru to colon & ostomy bag    Ileostomy: 1950cc dark green/brown fluid/liquid stool  UOP: unrecorded  IS: 1000cc    08/26/23 labs pending  Recent Labs     08/24/23  0335 08/25/23  0904 08/25/23  0905 08/26/23  0521   WBC 13.33* 10.87*  --  11.10*   HGB 9.4* 8.9*  --  9.0*    340  --  418*   SODIUM 141  --  142  --    K 3.4*  --  3.1*  --      --  107  --    CO2 27  --  25  --    BUN 14  --  18  --    CREATININE 0.74  --  0.80  --    GLUC 116  --  82  --    CALCIUM 8.8  --  8.5  --    MG 1.8*  --  1.8*  --        Plan:  - adv to regular diet  - 1L LR bolus  - [email or web address]  - DVT ppx

## 2023-08-26 LAB
ANION GAP SERPL CALCULATED.3IONS-SCNC: 8 MMOL/L
BUN SERPL-MCNC: 18 MG/DL (ref 5–25)
CALCIUM SERPL-MCNC: 8.6 MG/DL (ref 8.4–10.2)
CHLORIDE SERPL-SCNC: 105 MMOL/L (ref 96–108)
CO2 SERPL-SCNC: 28 MMOL/L (ref 21–32)
CREAT SERPL-MCNC: 0.83 MG/DL (ref 0.6–1.3)
ERYTHROCYTE [DISTWIDTH] IN BLOOD BY AUTOMATED COUNT: 15.4 % (ref 11.6–15.1)
GFR SERPL CREATININE-BSD FRML MDRD: 79 ML/MIN/1.73SQ M
GLUCOSE SERPL-MCNC: 128 MG/DL (ref 65–140)
GLUCOSE SERPL-MCNC: 133 MG/DL (ref 65–140)
HCT VFR BLD AUTO: 28.8 % (ref 34.8–46.1)
HGB BLD-MCNC: 9 G/DL (ref 11.5–15.4)
MAGNESIUM SERPL-MCNC: 2.4 MG/DL (ref 1.9–2.7)
MCH RBC QN AUTO: 29.6 PG (ref 26.8–34.3)
MCHC RBC AUTO-ENTMCNC: 31.3 G/DL (ref 31.4–37.4)
MCV RBC AUTO: 95 FL (ref 82–98)
PHOSPHATE SERPL-MCNC: 2.6 MG/DL (ref 2.7–4.5)
PLATELET # BLD AUTO: 418 THOUSANDS/UL (ref 149–390)
PMV BLD AUTO: 9.4 FL (ref 8.9–12.7)
POTASSIUM SERPL-SCNC: 3.2 MMOL/L (ref 3.5–5.3)
RBC # BLD AUTO: 3.04 MILLION/UL (ref 3.81–5.12)
SODIUM SERPL-SCNC: 141 MMOL/L (ref 135–147)
WBC # BLD AUTO: 11.1 THOUSAND/UL (ref 4.31–10.16)

## 2023-08-26 PROCEDURE — 85027 COMPLETE CBC AUTOMATED: CPT

## 2023-08-26 PROCEDURE — 80048 BASIC METABOLIC PNL TOTAL CA: CPT

## 2023-08-26 PROCEDURE — 84100 ASSAY OF PHOSPHORUS: CPT

## 2023-08-26 PROCEDURE — 99024 POSTOP FOLLOW-UP VISIT: CPT | Performed by: COLON & RECTAL SURGERY

## 2023-08-26 PROCEDURE — 83735 ASSAY OF MAGNESIUM: CPT

## 2023-08-26 PROCEDURE — 82948 REAGENT STRIP/BLOOD GLUCOSE: CPT

## 2023-08-26 RX ORDER — PROMETHAZINE HYDROCHLORIDE 25 MG/ML
25 INJECTION, SOLUTION INTRAMUSCULAR; INTRAVENOUS EVERY 6 HOURS PRN
Status: DISCONTINUED | OUTPATIENT
Start: 2023-08-26 | End: 2023-08-28

## 2023-08-26 RX ORDER — ACETAMINOPHEN 325 MG/1
650 TABLET ORAL EVERY 6 HOURS PRN
Status: DISCONTINUED | OUTPATIENT
Start: 2023-08-26 | End: 2023-08-29 | Stop reason: HOSPADM

## 2023-08-26 RX ADMIN — ACETAMINOPHEN 650 MG: 325 TABLET, FILM COATED ORAL at 02:09

## 2023-08-26 RX ADMIN — ONDANSETRON 4 MG: 2 INJECTION INTRAMUSCULAR; INTRAVENOUS at 17:56

## 2023-08-26 RX ADMIN — SODIUM CHLORIDE, SODIUM LACTATE, POTASSIUM CHLORIDE, AND CALCIUM CHLORIDE 125 ML/HR: .6; .31; .03; .02 INJECTION, SOLUTION INTRAVENOUS at 05:24

## 2023-08-26 RX ADMIN — HYDROMORPHONE HYDROCHLORIDE 0.2 MG: 0.2 INJECTION, SOLUTION INTRAMUSCULAR; INTRAVENOUS; SUBCUTANEOUS at 17:53

## 2023-08-26 RX ADMIN — HEPARIN SODIUM 5000 UNITS: 5000 INJECTION INTRAVENOUS; SUBCUTANEOUS at 05:24

## 2023-08-26 RX ADMIN — HYDROCORTISONE SODIUM SUCCINATE 50 MG: 100 INJECTION, POWDER, FOR SOLUTION INTRAMUSCULAR; INTRAVENOUS at 21:00

## 2023-08-26 RX ADMIN — HEPARIN SODIUM 5000 UNITS: 5000 INJECTION INTRAVENOUS; SUBCUTANEOUS at 22:08

## 2023-08-26 RX ADMIN — SODIUM CHLORIDE, SODIUM LACTATE, POTASSIUM CHLORIDE, AND CALCIUM CHLORIDE 1000 ML: .6; .31; .03; .02 INJECTION, SOLUTION INTRAVENOUS at 08:08

## 2023-08-26 RX ADMIN — POTASSIUM & SODIUM PHOSPHATES POWDER PACK 280-160-250 MG 3 PACKET: 280-160-250 PACK at 22:09

## 2023-08-26 RX ADMIN — SODIUM CHLORIDE, SODIUM LACTATE, POTASSIUM CHLORIDE, AND CALCIUM CHLORIDE 125 ML/HR: .6; .31; .03; .02 INJECTION, SOLUTION INTRAVENOUS at 17:55

## 2023-08-26 RX ADMIN — HYDROCORTISONE SODIUM SUCCINATE 50 MG: 100 INJECTION, POWDER, FOR SOLUTION INTRAMUSCULAR; INTRAVENOUS at 09:33

## 2023-08-26 RX ADMIN — ONDANSETRON 4 MG: 2 INJECTION INTRAMUSCULAR; INTRAVENOUS at 01:37

## 2023-08-26 RX ADMIN — HEPARIN SODIUM 5000 UNITS: 5000 INJECTION INTRAVENOUS; SUBCUTANEOUS at 12:30

## 2023-08-26 NOTE — PLAN OF CARE
Problem: Potential for Falls  Goal: Patient will remain free of falls  Description: INTERVENTIONS:  - Educate patient/family on patient safety including physical limitations  - Instruct patient to call for assistance with activity   - Consult OT/PT to assist with strengthening/mobility   - Keep Call bell within reach  - Keep bed low and locked with side rails adjusted as appropriate  - Keep care items and personal belongings within reach  - Initiate and maintain comfort rounds  - Make Fall Risk Sign visible to staff  - Offer Toileting every 2 Hours, in advance of need  - Initiate/Maintain alarm  - Obtain necessary fall risk management equipment:   - Apply yellow socks and bracelet for high fall risk patients  - Consider moving patient to room near nurses station  Outcome: Progressing     Problem: MOBILITY - ADULT  Goal: Maintain or return to baseline ADL function  Description: INTERVENTIONS:  -  Assess patient's ability to carry out ADLs; assess patient's baseline for ADL function and identify physical deficits which impact ability to perform ADLs (bathing, care of mouth/teeth, toileting, grooming, dressing, etc.)  - Assess/evaluate cause of self-care deficits   - Assess range of motion  - Assess patient's mobility; develop plan if impaired  - Assess patient's need for assistive devices and provide as appropriate  - Encourage maximum independence but intervene and supervise when necessary  - Involve family in performance of ADLs  - Assess for home care needs following discharge   - Consider OT consult to assist with ADL evaluation and planning for discharge  - Provide patient education as appropriate  Outcome: Progressing  Goal: Maintains/Returns to pre admission functional level  Description: INTERVENTIONS:  - Perform BMAT or MOVE assessment daily.   - Set and communicate daily mobility goal to care team and patient/family/caregiver.    - Collaborate with rehabilitation services on mobility goals if consulted  - Perform Range of Motion 3 times a day. - Reposition patient every 2 hours.   - Dangle patient 3 times a day  - Stand patient 3 times a day  - Ambulate patient 3 times a day  - Out of bed to chair 3 times a day   - Out of bed for meals 3 times a day  - Out of bed for toileting  - Record patient progress and toleration of activity level   Outcome: Progressing     Problem: PAIN - ADULT  Goal: Verbalizes/displays adequate comfort level or baseline comfort level  Description: Interventions:  - Encourage patient to monitor pain and request assistance  - Assess pain using appropriate pain scale  - Administer analgesics based on type and severity of pain and evaluate response  - Implement non-pharmacological measures as appropriate and evaluate response  - Consider cultural and social influences on pain and pain management  - Notify physician/advanced practitioner if interventions unsuccessful or patient reports new pain  Outcome: Progressing

## 2023-08-26 NOTE — NURSING NOTE
Patient had a BM in the bathroom from her rectum. Patient has an rlq illeostomy. Spoke with Dr. Cole, He says that this can happen and he is not concerned. Patient also had a bm last evening.

## 2023-08-27 LAB
ANION GAP SERPL CALCULATED.3IONS-SCNC: 8 MMOL/L
BASOPHILS # BLD AUTO: 0.01 THOUSANDS/ÂΜL (ref 0–0.1)
BASOPHILS NFR BLD AUTO: 0 % (ref 0–1)
BUN SERPL-MCNC: 13 MG/DL (ref 5–25)
CALCIUM SERPL-MCNC: 8.3 MG/DL (ref 8.4–10.2)
CHLORIDE SERPL-SCNC: 106 MMOL/L (ref 96–108)
CO2 SERPL-SCNC: 27 MMOL/L (ref 21–32)
CREAT SERPL-MCNC: 0.72 MG/DL (ref 0.6–1.3)
EOSINOPHIL # BLD AUTO: 0 THOUSAND/ÂΜL (ref 0–0.61)
EOSINOPHIL NFR BLD AUTO: 0 % (ref 0–6)
ERYTHROCYTE [DISTWIDTH] IN BLOOD BY AUTOMATED COUNT: 14.8 % (ref 11.6–15.1)
GFR SERPL CREATININE-BSD FRML MDRD: 94 ML/MIN/1.73SQ M
GLUCOSE SERPL-MCNC: 136 MG/DL (ref 65–140)
HCT VFR BLD AUTO: 27.7 % (ref 34.8–46.1)
HGB BLD-MCNC: 8.4 G/DL (ref 11.5–15.4)
IMM GRANULOCYTES # BLD AUTO: 0.11 THOUSAND/UL (ref 0–0.2)
IMM GRANULOCYTES NFR BLD AUTO: 1 % (ref 0–2)
LYMPHOCYTES # BLD AUTO: 0.41 THOUSANDS/ÂΜL (ref 0.6–4.47)
LYMPHOCYTES NFR BLD AUTO: 5 % (ref 14–44)
MAGNESIUM SERPL-MCNC: 2 MG/DL (ref 1.9–2.7)
MCH RBC QN AUTO: 29 PG (ref 26.8–34.3)
MCHC RBC AUTO-ENTMCNC: 30.3 G/DL (ref 31.4–37.4)
MCV RBC AUTO: 96 FL (ref 82–98)
MONOCYTES # BLD AUTO: 0.26 THOUSAND/ÂΜL (ref 0.17–1.22)
MONOCYTES NFR BLD AUTO: 3 % (ref 4–12)
NEUTROPHILS # BLD AUTO: 7.56 THOUSANDS/ÂΜL (ref 1.85–7.62)
NEUTS SEG NFR BLD AUTO: 91 % (ref 43–75)
NRBC BLD AUTO-RTO: 0 /100 WBCS
PHOSPHATE SERPL-MCNC: 3.7 MG/DL (ref 2.7–4.5)
PLATELET # BLD AUTO: 366 THOUSANDS/UL (ref 149–390)
PMV BLD AUTO: 9.5 FL (ref 8.9–12.7)
POTASSIUM SERPL-SCNC: 3.3 MMOL/L (ref 3.5–5.3)
RBC # BLD AUTO: 2.9 MILLION/UL (ref 3.81–5.12)
SODIUM SERPL-SCNC: 141 MMOL/L (ref 135–147)
WBC # BLD AUTO: 8.35 THOUSAND/UL (ref 4.31–10.16)

## 2023-08-27 PROCEDURE — 85025 COMPLETE CBC W/AUTO DIFF WBC: CPT | Performed by: STUDENT IN AN ORGANIZED HEALTH CARE EDUCATION/TRAINING PROGRAM

## 2023-08-27 PROCEDURE — 83735 ASSAY OF MAGNESIUM: CPT | Performed by: STUDENT IN AN ORGANIZED HEALTH CARE EDUCATION/TRAINING PROGRAM

## 2023-08-27 PROCEDURE — 84100 ASSAY OF PHOSPHORUS: CPT | Performed by: STUDENT IN AN ORGANIZED HEALTH CARE EDUCATION/TRAINING PROGRAM

## 2023-08-27 PROCEDURE — 99024 POSTOP FOLLOW-UP VISIT: CPT | Performed by: COLON & RECTAL SURGERY

## 2023-08-27 PROCEDURE — 80048 BASIC METABOLIC PNL TOTAL CA: CPT | Performed by: STUDENT IN AN ORGANIZED HEALTH CARE EDUCATION/TRAINING PROGRAM

## 2023-08-27 RX ORDER — POTASSIUM CHLORIDE 20 MEQ/1
40 TABLET, EXTENDED RELEASE ORAL ONCE
Status: DISCONTINUED | OUTPATIENT
Start: 2023-08-27 | End: 2023-08-27

## 2023-08-27 RX ORDER — POTASSIUM CHLORIDE 20MEQ/15ML
40 LIQUID (ML) ORAL ONCE
Status: COMPLETED | OUTPATIENT
Start: 2023-08-27 | End: 2023-08-27

## 2023-08-27 RX ORDER — DEXTROSE MONOHYDRATE, SODIUM CHLORIDE, AND POTASSIUM CHLORIDE 50; 1.49; 4.5 G/1000ML; G/1000ML; G/1000ML
75 INJECTION, SOLUTION INTRAVENOUS CONTINUOUS
Status: DISCONTINUED | OUTPATIENT
Start: 2023-08-27 | End: 2023-08-27

## 2023-08-27 RX ADMIN — POTASSIUM CHLORIDE 40 MEQ: 1.5 SOLUTION ORAL at 17:02

## 2023-08-27 RX ADMIN — DEXTROSE, SODIUM CHLORIDE, AND POTASSIUM CHLORIDE 75 ML/HR: 5; .45; .15 INJECTION INTRAVENOUS at 09:25

## 2023-08-27 RX ADMIN — ONDANSETRON 4 MG: 2 INJECTION INTRAMUSCULAR; INTRAVENOUS at 15:57

## 2023-08-27 RX ADMIN — ONDANSETRON 4 MG: 2 INJECTION INTRAMUSCULAR; INTRAVENOUS at 11:50

## 2023-08-27 RX ADMIN — PREDNISONE 30 MG: 10 TABLET ORAL at 09:25

## 2023-08-27 RX ADMIN — HEPARIN SODIUM 5000 UNITS: 5000 INJECTION INTRAVENOUS; SUBCUTANEOUS at 21:13

## 2023-08-27 RX ADMIN — HEPARIN SODIUM 5000 UNITS: 5000 INJECTION INTRAVENOUS; SUBCUTANEOUS at 14:40

## 2023-08-27 RX ADMIN — ACETAMINOPHEN 650 MG: 325 TABLET, FILM COATED ORAL at 01:30

## 2023-08-27 RX ADMIN — POTASSIUM CHLORIDE 40 MEQ: 1.5 SOLUTION ORAL at 12:00

## 2023-08-27 RX ADMIN — HEPARIN SODIUM 5000 UNITS: 5000 INJECTION INTRAVENOUS; SUBCUTANEOUS at 06:09

## 2023-08-27 RX ADMIN — ONDANSETRON 4 MG: 2 INJECTION INTRAMUSCULAR; INTRAVENOUS at 21:13

## 2023-08-27 NOTE — PROGRESS NOTES
Progress Note -colorectal surgery  Xiomara Giron 56 y.o. female MRN: [id number]  Unit/Bed#: [facility] 930-01 Encounter: [id number]    Assessment:  56 y.o. female s/p 8/17 LAR, colostomy reversal, diverting loop ileostomy for rectal adenocarcinoma. Discharged 8/21. Presented now with SBO on CT without clear transition point. Patient is obstruction this appears to have resolved. She is having bowel movements. Her 1 episode of nausea with emesis yesterday has also resolved, and she is now tolerating clears. Plan:  - Diet Regular; Regular House; No Carbonation  - Pain and Nausea control PRN  - Incentive spirometry  - OOB, ambulate  -Monitor for continued bowel function  - Daily dressing changes to old ostomy site. - Potential discharge within the next 24 to 48 hours    Subjective/Objective     Subjective: Feels significantly better comparison to the prior days. Having flatus and bowel movement. Denies any further emesis since yesterday afternoon. .      Objective:   Vitals: Blood pressure 114/73, pulse 100, temperature 98.1 °F (36.7 °C), resp. rate 20, height 5' 2" (1.575 m), weight 73.2 kg (161 lb 4.8 oz), SpO2 95 %. ,Body mass index is 29.5 kg/m². I/O       08/25 0701  08/26 0700 08/26 0701  08/27 0700 08/27 0701  08/28 0700    P. O. 0      I.V. (mL/kg)  1255.7 (17.2)     NG/GT       IV Piggyback  1000     Total Intake(mL/kg) 0 (0) 2255.7 (30.8)     Urine (mL/kg/hr) 200 (0.1) 600 (0.3)     Emesis/NG output  200     Stool 1950 350     Total Output 2150 1150     Net -2150 +1105.7            Unmeasured Stool Occurrence  1 x           Physical Exam:  Gen: NAD, Aox3, Comfortable in bed  Chest: Normal work of breathing, no respiratory distress  Abd: Soft, ND, mild tenderness. Surgical sites are clean, dry, intact. Old ostomy site with mild serous drainage. Loop ileostomy in place with green stool in the bag.   Ext: No Edema  Skin: Warm, Dry, Intact      Lab, Imaging and other studies:   I have personally reviewed pertinent reports.   , CBC with diff:   Lab Results   Component Value Date    WBC 8.35 08/27/2023    HGB 8.4 (L) 08/27/2023    HCT 27.7 (L) 08/27/2023    MCV 96 08/27/2023     08/27/2023    RBC 2.90 (L) 08/27/2023    MCH 29.0 08/27/2023    MCHC 30.3 (L) 08/27/2023    RDW 14.8 08/27/2023    MPV 9.5 08/27/2023    NRBC 0 08/27/2023   , BMP/CMP:   Lab Results   Component Value Date    SODIUM 141 08/27/2023    K 3.3 (L) 08/27/2023     08/27/2023    CO2 27 08/27/2023    BUN 13 08/27/2023    CREATININE 0.72 08/27/2023    CALCIUM 8.3 (L) 08/27/2023    EGFR 94 08/27/2023     VTE Pharmacologic Prophylaxis: Heparin  VTE Mechanical Prophylaxis: sequential compression device        Filemon Linton MD  8/27/2023  9:04 AM

## 2023-08-27 NOTE — PLAN OF CARE
Problem: Potential for Falls  Goal: Patient will remain free of falls  Description: INTERVENTIONS:  - Educate patient/family on patient safety including physical limitations  - Instruct patient to call for assistance with activity   - Consult OT/PT to assist with strengthening/mobility   - Keep Call bell within reach  - Keep bed low and locked with side rails adjusted as appropriate  - Keep care items and personal belongings within reach  - Initiate and maintain comfort rounds  - Make Fall Risk Sign visible to staff  - Offer Toileting every 2 Hours, in advance of need  - Initiate/Maintain alarm  - Obtain necessary fall risk management equipment:   - Apply yellow socks and bracelet for high fall risk patients  - Consider moving patient to room near nurses station  Outcome: Progressing     Problem: MOBILITY - ADULT  Goal: Maintain or return to baseline ADL function  Description: INTERVENTIONS:  -  Assess patient's ability to carry out ADLs; assess patient's baseline for ADL function and identify physical deficits which impact ability to perform ADLs (bathing, care of mouth/teeth, toileting, grooming, dressing, etc.)  - Assess/evaluate cause of self-care deficits   - Assess range of motion  - Assess patient's mobility; develop plan if impaired  - Assess patient's need for assistive devices and provide as appropriate  - Encourage maximum independence but intervene and supervise when necessary  - Involve family in performance of ADLs  - Assess for home care needs following discharge   - Consider OT consult to assist with ADL evaluation and planning for discharge  - Provide patient education as appropriate  Outcome: Progressing  Goal: Maintains/Returns to pre admission functional level  Description: INTERVENTIONS:  - Perform BMAT or MOVE assessment daily.   - Set and communicate daily mobility goal to care team and patient/family/caregiver.    - Collaborate with rehabilitation services on mobility goals if consulted  - Perform Range of Motion 2 times a day. - Reposition patient every 2 hours.   - Dangle patient 2 times a day  - Stand patient 2 times a day  - Ambulate patient 2 times a day  - Out of bed to chair 2 times a day   - Out of bed for meals 2 times a day  - Out of bed for toileting  - Record patient progress and toleration of activity level   Outcome: Progressing     Problem: PAIN - ADULT  Goal: Verbalizes/displays adequate comfort level or baseline comfort level  Description: Interventions:  - Encourage patient to monitor pain and request assistance  - Assess pain using appropriate pain scale  - Administer analgesics based on type and severity of pain and evaluate response  - Implement non-pharmacological measures as appropriate and evaluate response  - Consider cultural and social influences on pain and pain management  - Notify physician/advanced practitioner if interventions unsuccessful or patient reports new pain  Outcome: Progressing

## 2023-08-27 NOTE — PROGRESS NOTES
Progress Note  Dora Taylor 56 y.o. female MRN: [id number]  Unit/Bed#: [facility] 930-01 Encounter: [id number]    Assessment:  56 y.o. F s/p 8/17 LAR, colostomy reversal, diverting loop ileostomy for rectal adenocarcinoma. Discharged 8/21. Presented on 8/23 with SBO on CT without clear transition point. Subjective:  No acute overnight events. Pain controlled. Tolerating diet. Had some nausea overnight but no emesis. No BM, no flatus. Voiding, walking. Objective:  VSS on RA. /88   Pulse 96   Temp 98.3 °F (36.8 °C)   Resp 18   Ht 5' 2" (1.575 m)   Wt 73.2 kg (161 lb 4.8 oz)   SpO2 98%   BMI 29.50 kg/m²      Physical Exam:  General: NAD  HENT: NCAT  CV: nl rate  Lungs: nl wob. No resp distress. ABD: Soft, nondistended, mild LUQ tenderness. RLQ trocar site with serous drainage, not probeable. Ileostomy productive. Old ostomy site with scant serous drainage.   Extrem: No CCE  Neuro: alert & oriented    Ileostomy: 2040cc dark brown liquid/stool  UOP: 1100cc  IS: 800cc    08/28/23 labs pending  Recent Labs     08/26/23  0521 08/27/23  0556   WBC 11.10* 8.35   HGB 9.0* 8.4*   * 366   SODIUM 141 141   K 3.2* 3.3*    106   CO2 28 27   BUN 18 13   CREATININE 0.83 0.72   GLUC 128 136   CALCIUM 8.6 8.3*   MG 2.4 2.0       Plan:  - hi-fiber diet, metamucil  - 0.5:1 fluid replacements  - OOB, ambulate  - daily dressing changes to old ostomy site  - DVT ppx

## 2023-08-27 NOTE — CASE MANAGEMENT
Case Management Assessment & Discharge Planning Note    Patient name Evi Andre  Location [facility] 930/[facility] 930-01 MRN [id number]  : 1965 Date 2023       Current Admission Date: 2023  Current Admission Diagnosis:Small bowel obstruction [facility]), Abdominal pain   Patient Active Problem List    Diagnosis Date Noted   • Herpes zoster without complication    • Preop examination 2023   • Rectal cancer ([address]) 2023   • Drug-induced constipation 2023   • Palliative care patient 2023   • Cancer related pain 2023   • Port-A-Cath in place 2023   • Abnormal TSH 03/15/2023   • Dysuria 2023   • Chest pain 2023   • Abnormal CT of the chest 2023   • Kidney calculi    • Other specified anemias 02/15/2023   • Thrombocytopenia ([address]) 02/15/2023   • Nonrheumatic mitral valve regurgitation 02/15/2023   • Advance directive discussed with patient 02/15/2023   • Gross hematuria 02/15/2023   • Skin lesion 02/15/2023   • Bleeding from colostomy stoma ([address]) 2023   • Adrenal nodule ([address]) 2023   • Malignant neoplasm of sigmoid colon ([address]) 2022   • History of Clostridium difficile colitis 2022   • Other hemochromatosis 2022   • Hypertension 2022   • Sarcoidosis 2022   • Impaired fasting glucose 2022   • Hypokalemia 2022   • Transaminitis 2022   • R flank pain with bilateral hydronephrosis 2022      LOS (days): 3  Geometric Mean LOS (GMLOS) (days):   Days to GMLOS:     OBJECTIVE:  PATIENT READMITTED TO HOSPITAL  Risk of Unplanned Readmission Score: 31.13         Current admission status: Inpatient       [facility]acy[facility]   [facility] [address], [address]  [place] [id number]  Phone: [phone] Fax: 496 [facility] [place], [place] - [address] [facility] [address]  Phone: [phone] Fax: [address] [place] [address], 10 42 [facility] [address] [place] [address] [facility] [address]  Phone: [phone] Fax: [phone]    Primary Care Provider:  Everett Garcia MD    Primary Insurance: Bridgett Mendoza  Secondary Insurance:     ASSESSMENT:  [place], [address] & [facility] Representative - Relative   Primary Phone: [phone] (Mobile)                         Readmission Root Cause  30 Day Readmission: Yes  Who directed you to return to the hospital?: Family  Did you understand whom to contact if you had questions or problems?: Yes  Did you get your prescriptions before you left the hospital?: No  Reason[de-identified] Preference for own pharmacy  Were you able to get your prescriptions filled when you left the hospital?: Yes  Did you take your medications as prescribed?: Yes  Were you able to get to your follow-up appointments?: No  Reason[de-identified] Readmitted prior to appointment  During previous admission, was a post-acute recommendation made?: No  Patient was readmitted due to: SBO  Action Plan: resume [facility]    Patient Information  Admitted from[de-identified] Home  Mental Status: Alert  During Assessment patient was accompanied by: Not accompanied during assessment  Assessment information provided by[de-identified] Patient (Pt & prior cm assessment)  Primary Caregiver: Self  Support Systems: Spouse/significant other, Family members  Type of Current Residence: 2 story home  Upon entering residence, is there a bedroom on the main floor (no further steps)?: Yes  Upon entering residence, is there a bathroom on the main floor (no further steps)?: Yes (half bath 1st level, full bath 2nd.)  Living Arrangements: Lives w/ Spouse/significant other, Lives w/ Daughter (Lives aith ex- & daughters)    Activities of Daily Living Prior to Admission  Functional Status: Independent  Completes ADLs independently?: Yes  Ambulates independently?: Yes  Does patient use assisted devices?: Yes  Assisted Devices (DME) used: Charlotte Poe  Does patient currently own DME?: Yes  What DME does the patient currently own?: Charlotte Poe  Does patient have a history of Outpatient Therapy (PT/OT)?: No  Does the patient have a history of Short-Term Rehab?: No  Does patient have a history of HHC?: Yes  Does patient currently have [address]?: Yes    Current Home Health Care  Type of Current Home Care Services: Nurse visit, Home PT  [address][de-identified] [facility]    Patient Information Continued  Income Source: SSI/SSD  Does patient have prescription coverage?: Yes  Does patient have a history of substance abuse?: No  Does patient have a history of Mental Health Diagnosis?: No         Means of Transportation  [facility] Drives Self        DISCHARGE DETAILS:    Discharge planning discussed with[de-identified] patient  Freedom of Choice: Yes     CM contacted family/caregiver?: No- see comments  Were Treatment Team discharge recommendations reviewed with patient/caregiver?: Yes  Did patient/caregiver verbalize understanding of patient care needs?: Yes       Contacts  Patient Contacts: Liz Velazquez, ex-  Relationship to Patient[de-identified] Family  Contact Method: Phone  Phone Number: [phone]  Reason/Outcome: Emergency [address]         Is the patient interested in [address] at discharge?: Yes  [address] [facility] requested[de-identified] Nursing, Physical [address] [facility] Name[de-identified] [facility] External Referral Reason (only applicable if external HHA name selected): Patient has established relationship with provider  [address] Provider[de-identified] PCP  Home Health Services Needed[de-identified] Evaluate Functional Status and Safety, Gait/ADL Training, Post-Op Care and Assessment, Strengthening/Theraputic Exercises to Improve Function  Homebound Criteria Met[de-identified] Uses an Assist Device (i.e. cane, walker, etc)  Supporting Clincal Findings[de-identified] Limited Endurance           Transport at Discharge : Family                   0919 update: JENELLE CHARLES accepted. Updated DCI.

## 2023-08-27 NOTE — PLAN OF CARE
Problem: Potential for Falls  Goal: Patient will remain free of falls  Description: INTERVENTIONS:  - Educate patient/family on patient safety including physical limitations  - Instruct patient to call for assistance with activity   - Consult OT/PT to assist with strengthening/mobility   - Keep Call bell within reach  - Keep bed low and locked with side rails adjusted as appropriate  - Keep care items and personal belongings within reach  - Initiate and maintain comfort rounds  - Make Fall Risk Sign visible to staff  - Offer Toileting every 2 Hours, in advance of need  - Initiate/Maintain bed alarm  - Obtain necessary fall risk management equipment  - Apply yellow socks and bracelet for high fall risk patients  - Consider moving patient to room near nurses station  Outcome: Progressing     Problem: MOBILITY - ADULT  Goal: Maintain or return to baseline ADL function  Description: INTERVENTIONS:  -  Assess patient's ability to carry out ADLs; assess patient's baseline for ADL function and identify physical deficits which impact ability to perform ADLs (bathing, care of mouth/teeth, toileting, grooming, dressing, etc.)  - Assess/evaluate cause of self-care deficits   - Assess range of motion  - Assess patient's mobility; develop plan if impaired  - Assess patient's need for assistive devices and provide as appropriate  - Encourage maximum independence but intervene and supervise when necessary  - Involve family in performance of ADLs  - Assess for home care needs following discharge   - Consider OT consult to assist with ADL evaluation and planning for discharge  - Provide patient education as appropriate  Outcome: Progressing  Goal: Maintains/Returns to pre admission functional level  Description: INTERVENTIONS:  - Perform BMAT or MOVE assessment daily.   - Set and communicate daily mobility goal to care team and patient/family/caregiver.    - Collaborate with rehabilitation services on mobility goals if consulted  - Ambulate patient 3 times a day  - Out of bed to chair 3 times a day   - Out of bed for meals 3 times a day  - Out of bed for toileting  - Record patient progress and toleration of activity level   Outcome: Progressing     Problem: PAIN - ADULT  Goal: Verbalizes/displays adequate comfort level or baseline comfort level  Description: Interventions:  - Encourage patient to monitor pain and request assistance  - Assess pain using appropriate pain scale  - Administer analgesics based on type and severity of pain and evaluate response  - Implement non-pharmacological measures as appropriate and evaluate response  - Consider cultural and social influences on pain and pain management  - Notify physician/advanced practitioner if interventions unsuccessful or patient reports new pain  Outcome: Progressing

## 2023-08-28 LAB
ANION GAP SERPL CALCULATED.3IONS-SCNC: 9 MMOL/L
BACTERIA BLD CULT: NORMAL
BACTERIA BLD CULT: NORMAL
BASOPHILS # BLD AUTO: 0.03 THOUSANDS/ÂΜL (ref 0–0.1)
BASOPHILS NFR BLD AUTO: 0 % (ref 0–1)
BUN SERPL-MCNC: 10 MG/DL (ref 5–25)
CALCIUM SERPL-MCNC: 8.8 MG/DL (ref 8.4–10.2)
CHLORIDE SERPL-SCNC: 107 MMOL/L (ref 96–108)
CO2 SERPL-SCNC: 23 MMOL/L (ref 21–32)
CREAT SERPL-MCNC: 0.86 MG/DL (ref 0.6–1.3)
EOSINOPHIL # BLD AUTO: 0.07 THOUSAND/ÂΜL (ref 0–0.61)
EOSINOPHIL NFR BLD AUTO: 1 % (ref 0–6)
ERYTHROCYTE [DISTWIDTH] IN BLOOD BY AUTOMATED COUNT: 14.8 % (ref 11.6–15.1)
GFR SERPL CREATININE-BSD FRML MDRD: 76 ML/MIN/1.73SQ M
GLUCOSE SERPL-MCNC: 121 MG/DL (ref 65–140)
HCT VFR BLD AUTO: 30.6 % (ref 34.8–46.1)
HGB BLD-MCNC: 9.4 G/DL (ref 11.5–15.4)
IMM GRANULOCYTES # BLD AUTO: 0.13 THOUSAND/UL (ref 0–0.2)
IMM GRANULOCYTES NFR BLD AUTO: 2 % (ref 0–2)
LYMPHOCYTES # BLD AUTO: 0.64 THOUSANDS/ÂΜL (ref 0.6–4.47)
LYMPHOCYTES NFR BLD AUTO: 7 % (ref 14–44)
MCH RBC QN AUTO: 29.3 PG (ref 26.8–34.3)
MCHC RBC AUTO-ENTMCNC: 30.7 G/DL (ref 31.4–37.4)
MCV RBC AUTO: 95 FL (ref 82–98)
MONOCYTES # BLD AUTO: 0.38 THOUSAND/ÂΜL (ref 0.17–1.22)
MONOCYTES NFR BLD AUTO: 4 % (ref 4–12)
NEUTROPHILS # BLD AUTO: 7.37 THOUSANDS/ÂΜL (ref 1.85–7.62)
NEUTS SEG NFR BLD AUTO: 86 % (ref 43–75)
NRBC BLD AUTO-RTO: 0 /100 WBCS
PLATELET # BLD AUTO: 417 THOUSANDS/UL (ref 149–390)
PMV BLD AUTO: 9.1 FL (ref 8.9–12.7)
POTASSIUM SERPL-SCNC: 3 MMOL/L (ref 3.5–5.3)
RBC # BLD AUTO: 3.21 MILLION/UL (ref 3.81–5.12)
SODIUM SERPL-SCNC: 139 MMOL/L (ref 135–147)
WBC # BLD AUTO: 8.62 THOUSAND/UL (ref 4.31–10.16)

## 2023-08-28 PROCEDURE — 85025 COMPLETE CBC W/AUTO DIFF WBC: CPT | Performed by: COLON & RECTAL SURGERY

## 2023-08-28 PROCEDURE — 80048 BASIC METABOLIC PNL TOTAL CA: CPT | Performed by: COLON & RECTAL SURGERY

## 2023-08-28 PROCEDURE — 99024 POSTOP FOLLOW-UP VISIT: CPT | Performed by: COLON & RECTAL SURGERY

## 2023-08-28 RX ORDER — OXYCODONE HYDROCHLORIDE 10 MG/1
10 TABLET ORAL EVERY 4 HOURS PRN
Status: DISCONTINUED | OUTPATIENT
Start: 2023-08-28 | End: 2023-08-29 | Stop reason: HOSPADM

## 2023-08-28 RX ORDER — PREDNISONE 20 MG/1
20 TABLET ORAL DAILY
Status: DISCONTINUED | OUTPATIENT
Start: 2023-08-28 | End: 2023-08-29 | Stop reason: HOSPADM

## 2023-08-28 RX ORDER — OXYCODONE HYDROCHLORIDE 5 MG/1
5 TABLET ORAL EVERY 4 HOURS PRN
Status: DISCONTINUED | OUTPATIENT
Start: 2023-08-28 | End: 2023-08-29 | Stop reason: HOSPADM

## 2023-08-28 RX ORDER — HYDROMORPHONE HCL IN WATER/PF 6 MG/30 ML
0.2 PATIENT CONTROLLED ANALGESIA SYRINGE INTRAVENOUS ONCE
Status: DISCONTINUED | OUTPATIENT
Start: 2023-08-28 | End: 2023-08-28

## 2023-08-28 RX ORDER — PREDNISONE 20 MG/1
20 TABLET ORAL DAILY
Status: DISCONTINUED | OUTPATIENT
Start: 2023-08-28 | End: 2023-08-28

## 2023-08-28 RX ORDER — PREDNISONE 10 MG/1
10 TABLET ORAL DAILY
Status: DISCONTINUED | OUTPATIENT
Start: 2023-09-15 | End: 2023-08-29 | Stop reason: HOSPADM

## 2023-08-28 RX ORDER — PREDNISONE 5 MG/1
5 TABLET ORAL DAILY
Status: DISCONTINUED | OUTPATIENT
Start: 2023-09-29 | End: 2023-08-29 | Stop reason: HOSPADM

## 2023-08-28 RX ORDER — LIDOCAINE HYDROCHLORIDE AND EPINEPHRINE BITARTRATE 20; .01 MG/ML; MG/ML
1 INJECTION, SOLUTION SUBCUTANEOUS ONCE
Status: COMPLETED | OUTPATIENT
Start: 2023-08-28 | End: 2023-08-28

## 2023-08-28 RX ORDER — HYDROMORPHONE HCL IN WATER/PF 6 MG/30 ML
0.2 PATIENT CONTROLLED ANALGESIA SYRINGE INTRAVENOUS ONCE
Status: COMPLETED | OUTPATIENT
Start: 2023-08-28 | End: 2023-08-28

## 2023-08-28 RX ORDER — PROMETHAZINE HYDROCHLORIDE 25 MG/ML
25 INJECTION, SOLUTION INTRAMUSCULAR; INTRAVENOUS EVERY 6 HOURS PRN
Status: DISCONTINUED | OUTPATIENT
Start: 2023-08-28 | End: 2023-08-29 | Stop reason: HOSPADM

## 2023-08-28 RX ORDER — POTASSIUM CHLORIDE 20 MEQ/1
40 TABLET, EXTENDED RELEASE ORAL EVERY 4 HOURS
Status: COMPLETED | OUTPATIENT
Start: 2023-08-28 | End: 2023-08-28

## 2023-08-28 RX ADMIN — POTASSIUM CHLORIDE 40 MEQ: 1500 TABLET, EXTENDED RELEASE ORAL at 21:05

## 2023-08-28 RX ADMIN — ONDANSETRON 4 MG: 2 INJECTION INTRAMUSCULAR; INTRAVENOUS at 09:38

## 2023-08-28 RX ADMIN — HYDROMORPHONE HYDROCHLORIDE 0.2 MG: 0.2 INJECTION, SOLUTION INTRAMUSCULAR; INTRAVENOUS; SUBCUTANEOUS at 16:37

## 2023-08-28 RX ADMIN — LIDOCAINE HYDROCHLORIDE 10 ML: 10; .005 INJECTION, SOLUTION EPIDURAL; INFILTRATION; INTRACAUDAL; PERINEURAL at 16:37

## 2023-08-28 RX ADMIN — POTASSIUM CHLORIDE 40 MEQ: 1500 TABLET, EXTENDED RELEASE ORAL at 12:14

## 2023-08-28 RX ADMIN — HEPARIN SODIUM 5000 UNITS: 5000 INJECTION INTRAVENOUS; SUBCUTANEOUS at 06:22

## 2023-08-28 RX ADMIN — POTASSIUM CHLORIDE 40 MEQ: 1500 TABLET, EXTENDED RELEASE ORAL at 15:54

## 2023-08-28 RX ADMIN — LIDOCAINE HYDROCHLORIDE,EPINEPHRINE BITARTRATE 1 ML: 20; .01 INJECTION, SOLUTION INFILTRATION; PERINEURAL at 09:58

## 2023-08-28 RX ADMIN — HEPARIN SODIUM 5000 UNITS: 5000 INJECTION INTRAVENOUS; SUBCUTANEOUS at 21:06

## 2023-08-28 RX ADMIN — PREDNISONE 20 MG: 20 TABLET ORAL at 09:43

## 2023-08-28 RX ADMIN — HEPARIN SODIUM 5000 UNITS: 5000 INJECTION INTRAVENOUS; SUBCUTANEOUS at 15:48

## 2023-08-28 RX ADMIN — PROMETHAZINE HYDROCHLORIDE 25 MG: 25 INJECTION INTRAMUSCULAR; INTRAVENOUS at 12:11

## 2023-08-28 RX ADMIN — SODIUM CHLORIDE, SODIUM LACTATE, POTASSIUM CHLORIDE, AND CALCIUM CHLORIDE 500 ML: .6; .31; .03; .02 INJECTION, SOLUTION INTRAVENOUS at 09:35

## 2023-08-28 RX ADMIN — ONDANSETRON 4 MG: 2 INJECTION INTRAMUSCULAR; INTRAVENOUS at 01:49

## 2023-08-28 NOTE — CASE MANAGEMENT
Case Management Progress Note    Patient name Henry Sun  [facility] MRN [id number]  : 1965 Date 2023       LOS (days): 4  Geometric Mean LOS (GMLOS) (days):   Days to GMLOS:        OBJECTIVE:        Current admission status: Inpatient  [facility]acy[facility]   [facility] [address] [place] [address] [address]  [address]  [place] [address] [facility]  Phone: [phone] Fax: 700 [facility] [facility] [place] - [address]  [address] [place] [address]  Phone: [phone] Fax: [phone]    [address] [facility], [address] 42 [facility] [address] [address]  Phone: [phone] Fax: [phone]    Primary Care Provider: Salvador Batista MD    Primary Insurance: [facility]  Secondary Insurance:     PROGRESS NOTE:  Per surgery, NMR, anticipate Tues/Weds. [facility] reserved for resumption of services. CM to continue to support.

## 2023-08-28 NOTE — QUICK NOTE
Attempted to place a suture in port site at bedside under local anesthesia. Patient tolerated at first but then reported pain and requested that I stop. Suture placement was aborted due to severe pain, and wound redressed. She received pain medication. Patient will need suture when pain is under under control.     Jose E Clark MD  8/28/2023 11:23 AM

## 2023-08-29 VITALS
SYSTOLIC BLOOD PRESSURE: 148 MMHG | HEIGHT: 62 IN | WEIGHT: 161.3 LBS | OXYGEN SATURATION: 97 % | DIASTOLIC BLOOD PRESSURE: 56 MMHG | TEMPERATURE: 98.5 F | BODY MASS INDEX: 29.68 KG/M2 | RESPIRATION RATE: 15 BRPM | HEART RATE: 100 BPM

## 2023-08-29 PROBLEM — K56.600 PARTIAL SMALL BOWEL OBSTRUCTION (HCC): Status: ACTIVE | Noted: 2023-08-29

## 2023-08-29 LAB
ANION GAP SERPL CALCULATED.3IONS-SCNC: 7 MMOL/L
BUN SERPL-MCNC: 9 MG/DL (ref 5–25)
CALCIUM SERPL-MCNC: 8.8 MG/DL (ref 8.4–10.2)
CHLORIDE SERPL-SCNC: 111 MMOL/L (ref 96–108)
CO2 SERPL-SCNC: 21 MMOL/L (ref 21–32)
CREAT SERPL-MCNC: 0.82 MG/DL (ref 0.6–1.3)
GFR SERPL CREATININE-BSD FRML MDRD: 80 ML/MIN/1.73SQ M
GLUCOSE SERPL-MCNC: 106 MG/DL (ref 65–140)
POTASSIUM SERPL-SCNC: 4.2 MMOL/L (ref 3.5–5.3)
SODIUM SERPL-SCNC: 139 MMOL/L (ref 135–147)

## 2023-08-29 PROCEDURE — 80048 BASIC METABOLIC PNL TOTAL CA: CPT | Performed by: COLON & RECTAL SURGERY

## 2023-08-29 PROCEDURE — G0180 MD CERTIFICATION HHA PATIENT: HCPCS | Performed by: COLON & RECTAL SURGERY

## 2023-08-29 PROCEDURE — NC001 PR NO CHARGE: Performed by: COLON & RECTAL SURGERY

## 2023-08-29 PROCEDURE — 99024 POSTOP FOLLOW-UP VISIT: CPT | Performed by: COLON & RECTAL SURGERY

## 2023-08-29 RX ADMIN — PREDNISONE 20 MG: 20 TABLET ORAL at 10:10

## 2023-08-29 RX ADMIN — HEPARIN SODIUM 5000 UNITS: 5000 INJECTION INTRAVENOUS; SUBCUTANEOUS at 06:28

## 2023-08-29 RX ADMIN — Medication 1 PACKET: at 10:10

## 2023-08-29 NOTE — DISCHARGE INSTR - AVS FIRST PAGE
DISCHARGE INSTRUCTIONS    Follow Up: Follow up with Dr. Bridget Macedo as scheduled  Steroid Taper continue as ordered  CONTINUE LOVENOX 40mg injection daily until 9/14  Diet: You may resume a regular diet  Pain: Continue pain control regimen as ordered. Shower: You may shower over the wound. Do not bathe or use a pool or hot tub until cleared by the physician. Activity: As tolerated. You may go up and down stairs, walk as much as you are comfortable, but walk at least 3 times each day. Do not lift anything heavier than 15 pounds for at least 2-4 weeks, unless cleared by the doctor. Driving: Do not drive or make any important decisions while on narcotic pain medication. Generally, you may drive when your off all narcotic pain medications. Medications: Resume all of your previous medications, unless told otherwise by the doctor. You do not need to take the narcotic pain medications unless you are having significant pain and discomfort. Call the office: If you are experiencing any of the following, fevers above 101.5° or chills, significant nausea or vomiting, increase in abdominal pain, if the wound develops drainage and/or is excessive redness around the wound, or if you have significant diarrhea or other worsening symptoms.

## 2023-08-29 NOTE — DISCHARGE SUMMARY
Discharge Summary - Colorectal Surgery   Kel Vernon 56 y.o. female MRN: [id number]  [facility] Encounter: [id number]    Admission Date: 8/23/2023     Discharge Date: 8/29/2023    Admitting Diagnosis: Small bowel obstruction ([address]) [K56.609]  Abdominal pain [R10.9]    Discharge Diagnosis: Partial SBO s/p conservative management with IVF, NPO, NGT, bowel rest, pain and anti-emetic control, and slow advancement of diet with return of bowel functions. Attending and Service: Dr. Chris Banerjee, Colorectal Surgical Services. Consulting Physician(s): None    Imaging and Procedures Performed: No orders of the defined types were placed in this encounter. XR abdomen 1 view kub    Result Date: 8/25/2023  Impression: There is a nonspecific nonobstructive bowel gas pattern, with contrast throughout small bowel loops and in the ileostomy bag. No significantly dilated loops demonstrated. Continued follow-up recommended. Workstation performed: [id number]     XR chest portable    Result Date: 8/24/2023  Impression: Suspected subsegmental atelectasis at both lung bases Workstation performed: [id number]     CT Abdomen pelvis with contrast    Result Date: 8/23/2023  Impression: Multiple loops of abnormally dilated proximal to mid small bowel with relative collapse of the distal small bowel most compatible with small bowel obstruction. A clear transition point is not identified. Surgical consultation is advised. A few scattered tiny foci of free intraperitoneal air are noted likely postoperative in nature in this patient with a history of recent low anterior colon resection with right mid abdominal ileostomy. Small amount of intra-abdominal and pelvic ascites. Stable position of bilateral ureteral stents with no significant hydronephrosis.  I personally discussed this study with Dr Alonso Thompson on 8/23/2023 10:46 PM. Workstation performed: [id number]     Hospital Course: Kel Vernon is a 72-year-old female with PMHx of rectal ca, s/p chemo rads and loop colostomy s/p 8/17 robot LAR, colostomy reversal, diverting loop ileostomy- Erna, and sarcoidosis on prednisone who presented with increased abdominal pain, decrease stoma function, +N/V. Patient was admitted under the colorectal surgical service for conservative management of partial small bowel obstruction. She was made n.p.o., NG tube was inserted, bowel rest was initiated, IV fluids for hydration, and the weight return of bowel function. She was encouraged to be out of bed and ambulating as well as utilizing her incentive spirometer throughout her hospital stay. By hospital day 4 she started having more significant stoma function, her NG tube was removed, and her diet was slowly advanced as tolerated without nausea or vomiting. She was cleared for discharge on hospital day 6 as she was tolerating diet without nausea or vomiting, out of bed and ambulating without any assistance, urinating without any difficulties, utilizing her incentive spirometer, and her pain remained controlled on her p.o. regimen. All discharge instructions as well as postoperative follow-up appointments were discussed with the patient at bedside, she was in agreement with plan. All questions answered. On discharge, the patient is instructed to follow-up with the patient's primary care provider within the next 2 weeks to review the events of the recent hospitalization. The patient is instructed to follow-up with Dr. Sky Fajardo as scheduled. The patient is instructed to follow the provided discharge instructions. Condition at Discharge: good     Discharge instructions/Information to patient and family:   See after visit summary for information provided to patient and family. Provisions for Follow-Up Care:  See after visit summary for information related to follow-up care and any pertinent home health orders.       Disposition: Home w/ VNA    Planned Readmission: No    Discharge Statement   I spent 30 minutes discharging the patient. This time was spent on the day of discharge. I had direct contact with the patient on the day of discharge. Additional documentation is required if more than 30 minutes were spent on discharge. Discharge Medications:  See after visit summary for reconciled discharge medications provided to patient and family.     Gerry Ahuja PA-C  8/29/2023  10:56 AM

## 2023-08-29 NOTE — PLAN OF CARE
Problem: Potential for Falls  Goal: Patient will remain free of falls  Description: INTERVENTIONS:  - Educate patient/family on patient safety including physical limitations  - Instruct patient to call for assistance with activity   - Consult OT/PT to assist with strengthening/mobility   - Keep Call bell within reach  - Keep bed low and locked with side rails adjusted as appropriate  - Keep care items and personal belongings within reach  - Initiate and maintain comfort rounds  - Make Fall Risk Sign visible to staff  - Offer Toileting every 3 Hours, in advance of need  - Initiate/Maintain 3alarm  - Obtain necessary fall risk management equipment: 3  - Apply yellow socks and bracelet for high fall risk patients  - Consider moving patient to room near nurses station  Outcome: Progressing     Problem: MOBILITY - ADULT  Goal: Maintain or return to baseline ADL function  Description: INTERVENTIONS:  -  Assess patient's ability to carry out ADLs; assess patient's baseline for ADL function and identify physical deficits which impact ability to perform ADLs (bathing, care of mouth/teeth, toileting, grooming, dressing, etc.)  - Assess/evaluate cause of self-care deficits   - Assess range of motion  - Assess patient's mobility; develop plan if impaired  - Assess patient's need for assistive devices and provide as appropriate  - Encourage maximum independence but intervene and supervise when necessary  - Involve family in performance of ADLs  - Assess for home care needs following discharge   - Consider OT consult to assist with ADL evaluation and planning for discharge  - Provide patient education as appropriate  Outcome: Progressing  Goal: Maintains/Returns to pre admission functional level  Description: INTERVENTIONS:  - Perform BMAT or MOVE assessment daily.   - Set and communicate daily mobility goal to care team and patient/family/caregiver.    - Collaborate with rehabilitation services on mobility goals if consulted  - Perform Range of Motion 3 times a day. - Reposition patient every 3 hours.   - Dangle patient 3 times a day  - Stand patient 3 times a day  - Ambulate patient 3 times a day  - Out of bed to chair 3 times a day   - Out of bed for meals 3 times a day  - Out of bed for toileting  - Record patient progress and toleration of activity level   Outcome: Progressing     Problem: PAIN - ADULT  Goal: Verbalizes/displays adequate comfort level or baseline comfort level  Description: Interventions:  - Encourage patient to monitor pain and request assistance  - Assess pain using appropriate pain scale  - Administer analgesics based on type and severity of pain and evaluate response  - Implement non-pharmacological measures as appropriate and evaluate response  - Consider cultural and social influences on pain and pain management  - Notify physician/advanced practitioner if interventions unsuccessful or patient reports new pain  Outcome: Progressing

## 2023-08-29 NOTE — PROGRESS NOTES
General Surgery  Progress Note   Bre Long 56 y.o. female MRN: [id number]  Unit/Bed#: [facility] 930-01 Encounter: [id number]    Assessment:  56 y.o. F s/p  LAR, colostomy reversal, diverting loop ileostomy for rectal adenocarcinoma. Discharged . Presented on  with SBO on CT without clear transition point. Tmax 98.7, tachycardia to 101 at 9:29 AM yesterday, systolic blood pressure in the 140s, 97% O2 on room air  UOP: 800 cc a.m. shift  150 cc a.m. shift, ~75 cc p.m. shift  Ileostomy 150 cc a.m. shift, ~75 cc p.m. shift    Today: Creatinine 0.86 from 0.72, potassium 3.0 from 3.33  Yesterday WBC 8.62 from 8.35, hemoglobin 9.4 from 8.4, platelets 803 from 302    Plan:  - hi-fiber diet, metamucil  - 0.5:1 fluid replacements  - OOB, ambulate  - daily dressing changes to old ostomy site  - DVT ppx  -Replete electrolytes    Subjective/Objective     Subjective:   Patient seen and examined at bedside, in no acute distress. No acute events overnight. Patient's pain is well controlled. Patient reports just chronic left-sided rib pain. Patient is passing flatus and bowel movements via ostomy. Patient denies nausea vomiting fevers chills or shortness of breath. Objective:   Vitals:Blood pressure 144/76, pulse 100, temperature 98.7 °F (37.1 °C), resp. rate 15, height 5' 2" (1.575 m), weight 73.2 kg (161 lb 4.8 oz), SpO2 97 %. Temp (24hrs), Av.5 °F (36.9 °C), Min:98.1 °F (36.7 °C), Max:98.7 °F (37.1 °C)      I/O        07 0700  07 0700    P. O. 240 120    I.V. (mL/kg) 438.8 (6)     IV Piggyback  500    Total Intake(mL/kg) 678.8 (9.3) 620 (8.5)    Urine (mL/kg/hr) 1100 (0.6) 800 (0.5)    Stool 2040 150    Total Output 3140 950    Net -2461.3 -330                Invasive Devices     Central Venous Catheter Line  Duration           Port A Cath 23 Right Internal jugular 160 days          Peripheral Intravenous Line  Duration           Peripheral IV 23 Right Antecubital 1 day          Drain  Duration           Ureteral Internal Stent Left ureter 6 Fr. 38 days    Ureteral Internal Stent Right ureter 7 Fr. 38 days    Ileostomy Loop RLQ 11 days                Physical Exam:  General: No acute distress  Neuro: Awake, Alert and Oriented x 3  HEENT:  Normocephalic, atraumatic, moist mucous membranes  CV: Regular rate and rhythm  Lungs: Normal work of breathing, no increased respiratory effort  Abdomen: Soft, tender around left-sided ribs, non-distended.   Prior ostomy site redressed, stoma pink and functioning well   extremities: No edema, clubbing or cyanosis  Skin: Warm, dry    Lab Results:   BMP/CMP:   Lab Results   Component Value Date    SODIUM 139 08/28/2023    K 3.0 (L) 08/28/2023     08/28/2023    CO2 23 08/28/2023    BUN 10 08/28/2023    CREATININE 0.86 08/28/2023    CALCIUM 8.8 08/28/2023    EGFR 76 08/28/2023    and CBC:   Lab Results   Component Value Date    WBC 8.62 08/28/2023    HGB 9.4 (L) 08/28/2023    HCT 30.6 (L) 08/28/2023    MCV 95 08/28/2023     (H) 08/28/2023    RBC 3.21 (L) 08/28/2023    MCH 29.3 08/28/2023    MCHC 30.7 (L) 08/28/2023    RDW 14.8 08/28/2023    MPV 9.1 08/28/2023    NRBC 0 08/28/2023     VTE Prophylaxis: Sequential compression device (Venodyne)  and Heparin      Filiberto Paul MD  8/29/2023  5:11 AM

## 2023-08-30 ENCOUNTER — OFFICE VISIT (OUTPATIENT)
Dept: INTERNAL MEDICINE CLINIC | Facility: CLINIC | Age: 58
End: 2023-08-30
Payer: COMMERCIAL

## 2023-08-30 ENCOUNTER — TELEPHONE (OUTPATIENT)
Dept: HEMATOLOGY ONCOLOGY | Facility: CLINIC | Age: 58
End: 2023-08-30

## 2023-08-30 VITALS
OXYGEN SATURATION: 99 % | HEIGHT: 62 IN | SYSTOLIC BLOOD PRESSURE: 108 MMHG | HEART RATE: 118 BPM | DIASTOLIC BLOOD PRESSURE: 70 MMHG | BODY MASS INDEX: 26.5 KG/M2 | WEIGHT: 144 LBS

## 2023-08-30 DIAGNOSIS — B02.29 POST HERPETIC NEURALGIA: ICD-10-CM

## 2023-08-30 DIAGNOSIS — H92.02 LEFT EAR PAIN: ICD-10-CM

## 2023-08-30 DIAGNOSIS — R51.9 SCALP PAIN: ICD-10-CM

## 2023-08-30 DIAGNOSIS — B02.9 SHINGLES: ICD-10-CM

## 2023-08-30 DIAGNOSIS — R11.0 NAUSEA: ICD-10-CM

## 2023-08-30 DIAGNOSIS — Z23 ENCOUNTER FOR IMMUNIZATION: Primary | ICD-10-CM

## 2023-08-30 DIAGNOSIS — Z79.01 CHRONIC ANTICOAGULATION: ICD-10-CM

## 2023-08-30 PROCEDURE — 99214 OFFICE O/P EST MOD 30 MIN: CPT | Performed by: GENERAL ACUTE CARE HOSPITAL

## 2023-08-30 RX ORDER — ONDANSETRON 4 MG/1
4 TABLET, FILM COATED ORAL EVERY 8 HOURS PRN
Qty: 20 TABLET | Refills: 0 | Status: SHIPPED | OUTPATIENT
Start: 2023-08-30

## 2023-08-30 RX ORDER — GABAPENTIN 100 MG/1
200 CAPSULE ORAL 2 TIMES DAILY
Qty: 60 CAPSULE | Refills: 1 | Status: SHIPPED | OUTPATIENT
Start: 2023-08-30

## 2023-08-30 NOTE — ASSESSMENT & PLAN NOTE
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 On my exam there is no active lesion inside ear. Will refer to ENT for further evaluation.

## 2023-08-30 NOTE — TELEPHONE ENCOUNTER
Called and spoke to patients relative, Cheryl Jimenez regarding scheduling a CT scan and FU with Dr Emilia Ortiz in December. Confirmed appts with Tino Rodrigez for 12/11/2023 at 10:30am at [facility] and FU with Dr Emilia Ortiz on 12/19/2023 at 10:20am at [facility]. Advised patient will need to  Barium before the CT scan, and I will send instructions via mail.

## 2023-08-30 NOTE — ASSESSMENT & PLAN NOTE
Patient was discharged on Lovenox. Patient is not sure if she should continue it at home. Patient reports she was doing Lovenox injection at home before hospitalization. I will confirm with the ordering physician.

## 2023-08-30 NOTE — PROGRESS NOTES
Name: Socrates Jha      : 1965      MRN: [id number]  Encounter Provider: Jose Maria Mcnamara MD  Encounter Date: 2023   [facility]    Assessment & Plan     1. Encounter for immunization    2. Shingles  -     gabapentin (Neurontin) 100 mg capsule; Take 2 capsules (200 mg total) by mouth 2 (two) times a day To reduce shingles pain  -     Ambulatory Referral to Otolaryngology; Future  -     ondansetron (ZOFRAN) 4 mg tablet; Take 1 tablet (4 mg total) by mouth every 8 (eight) hours as needed for nausea or vomiting    3. Post herpetic neuralgia    4. Nausea  -     ondansetron (ZOFRAN) 4 mg tablet; Take 1 tablet (4 mg total) by mouth every 8 (eight) hours as needed for nausea or vomiting    5. 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 On exam there are some dried erythema on left scalp that is likely from her previous shingles but there is no active lesion  Another differential is trigeminal neuralgia but given her recent shingles infection post shingles neuropathy is more likely  She was given gabapentin in last visit but never started. I will recent gabapentin 202 times a day. She will let me know after a week about the pain and will likely increase dose if needed. 6. Left ear pain  Assessment & Plan:  Likely secondary to post shingles neuralgia. On my exam there is no active lesion inside ear. Will refer to ENT for further evaluation. 7. Chronic anticoagulation  Assessment & Plan:  Patient was discharged on Lovenox. Patient is not sure if she should continue it at home. Patient reports she was doing Lovenox injection at home before hospitalization. I will confirm with the ordering physician. Subjective      HPI   Patient was just discharged yesterday for SBO. Yesterday she experienced pain in the left side of her scalp and inside her ear.   She reports when she scratches it it feels like she is being hit by a baseball bat.  This morning she has not had much pain. She reports she only has pain when she touches that. She recently had shingles. Review of Systems    Current Outpatient Medications on File Prior to Visit   Medication Sig   • acetaminophen (TYLENOL) 500 mg tablet Take 2 tablets (1,000 mg total) by mouth 3 (three) times a day   • amLODIPine (NORVASC) 10 mg tablet Take 1 tablet (10 mg total) by mouth daily (Patient taking differently: Take 20 mg by mouth daily)   • cyanocobalamin (VITAMIN B-12) 1000 MCG tablet Take 1,000 mcg by mouth daily   • enoxaparin (LOVENOX) 40 mg/0.4 mL Inject 0.4 mL (40 mg total) under the skin every 24 hours for 24 days   • morphine (MSIR) 15 mg tablet Take 0.5-1 tablets (7.5-15 mg total) by mouth every 4 (four) hours as needed for severe pain or moderate pain for up to 15 days Max Daily Amount: 90 mg   • potassium chloride (MICRO-K) 10 MEQ CR capsule Take 4 capsules (40 mEq total) by mouth 2 (two) times a day   • predniSONE 10 mg tablet Take 4 tablets (40 mg total) by mouth daily Please take for 4 weeks and then decrease by 5 mg increments every 2 weeks. (Patient taking differently: Take 30 mg by mouth daily Please take for 4 weeks and then decrease by 5 mg increments every 2 weeks.  -- Taking 20mg and then 10mg - titrating)   • psyllium (METAMUCIL) packet Take 1 packet by mouth 3 (three) times a day   • ursodiol (ACTIGALL) 300 mg capsule Take 3 capsules (900 mg total) by mouth in the morning   • methocarbamol (ROBAXIN) 500 mg tablet Take 1 tablet (500 mg total) by mouth every 8 (eight) hours for 5 days   • oxybutynin (DITROPAN) 5 mg tablet Take 1 tablet (5 mg total) by mouth every 6 (six) hours as needed (bladder spasms) (Patient not taking: Reported on 8/24/2023)   • [DISCONTINUED] gabapentin (Neurontin) 100 mg capsule Take 2 capsules (200 mg total) by mouth 2 (two) times a day To reduce shingles pain (Patient not taking: Reported on 8/24/2023)       Objective     /70 (BP Location: Left arm, Patient Position: Sitting, Cuff Size: Standard)   Pulse (!) 118   Ht 5' 2" (1.575 m)   Wt 65.3 kg (144 lb)   SpO2 99%   BMI 26.34 kg/m²     Physical Exam  Henrey Gilford, MD

## 2023-08-30 NOTE — ASSESSMENT & PLAN NOTE
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 On exam there are some dried erythema on left scalp that is likely from her previous shingles but there is no active lesion  Another differential is trigeminal neuralgia but given her recent shingles infection post shingles neuropathy is more likely  She was given gabapentin in last visit but never started. I will recent gabapentin 202 times a day. She will let me know after a week about the pain and will likely increase dose if needed.

## 2023-08-31 ENCOUNTER — HOME CARE VISIT (OUTPATIENT)
Dept: HOME HEALTH SERVICES | Facility: HOME HEALTHCARE | Age: 58
End: 2023-08-31
Payer: COMMERCIAL

## 2023-08-31 ENCOUNTER — DOCUMENTATION (OUTPATIENT)
Dept: HEMATOLOGY ONCOLOGY | Facility: CLINIC | Age: 58
End: 2023-08-31

## 2023-08-31 VITALS
DIASTOLIC BLOOD PRESSURE: 70 MMHG | OXYGEN SATURATION: 98 % | HEART RATE: 110 BPM | TEMPERATURE: 97.4 F | SYSTOLIC BLOOD PRESSURE: 128 MMHG | RESPIRATION RATE: 18 BRPM

## 2023-08-31 PROCEDURE — G0299 HHS/HOSPICE OF RN EA 15 MIN: HCPCS

## 2023-08-31 NOTE — UTILIZATION REVIEW
NOTIFICATION OF ADMISSION DISCHARGE   This is a Notification of Discharge from [facility]. Please be advised that this patient has been discharge from our facility. Below you will find the admission and discharge date and time including the patient’s disposition. UTILIZATION REVIEW CONTACT:  Bryon Avalos  Utilization   Network Utilization Review Department  Phone: [phone] x carefully listen to the prompts. All voicemails are confidential.  Email: [email or web address]     ADMISSION INFORMATION  PRESENTATION DATE: 8/23/2023  6:21 PM  OBERVATION ADMISSION DATE:   INPATIENT ADMISSION DATE: 8/24/23  1:41 AM   DISCHARGE DATE: 8/29/2023  1:20 PM   DISPOSITION:Home with Home Health Care    IMPORTANT INFORMATION:  Send all requests for admission clinical reviews, approved or denied determinations and any other requests to dedicated fax number below belonging to the campus where the patient is receiving treatment.  List of dedicated fax numbers:  [place] DENIALS (Administrative/Medical Necessity) [phone] [address] [facility] [place] (Maternity/NICU/Pediatrics) [phone]   [facility] [phone]   [facility] [phone] [address] [facility] [phone]   [address] [place] [address] [phone]   [facility] [phone]   [address] [place] [address] [facility] [phone]   [address] [facility] [phone]   [address] [facility] [phone] [address] [facility] [address] [facility] [phone]

## 2023-08-31 NOTE — PROGRESS NOTES
[facility] called stating they have not received the pts pathology slides back from our facility.   Called our path dept and spoke with Keyla Oneill, she stated she will look into it , and send the slides back to [facility]

## 2023-09-01 ENCOUNTER — HOME CARE VISIT (OUTPATIENT)
Dept: HOME HEALTH SERVICES | Facility: HOME HEALTHCARE | Age: 58
End: 2023-09-01
Payer: COMMERCIAL

## 2023-09-01 NOTE — CASE COMMUNICATION
TC Spoke with spouse this morning. Pt does not feel up to initiating home PT today  He requests PT contact them next week to set up an appt if pt feels up to it. This note serves as notification to referring physician delay in intitiating PT service more than 7 days.

## 2023-09-06 ENCOUNTER — HOME CARE VISIT (OUTPATIENT)
Dept: HOME HEALTH SERVICES | Facility: HOME HEALTHCARE | Age: 58
End: 2023-09-06
Payer: COMMERCIAL

## 2023-09-06 VITALS
TEMPERATURE: 97.4 F | SYSTOLIC BLOOD PRESSURE: 120 MMHG | DIASTOLIC BLOOD PRESSURE: 68 MMHG | HEART RATE: 96 BPM | OXYGEN SATURATION: 98 % | RESPIRATION RATE: 18 BRPM

## 2023-09-06 PROCEDURE — G0299 HHS/HOSPICE OF RN EA 15 MIN: HCPCS

## 2023-09-07 ENCOUNTER — HOME CARE VISIT (OUTPATIENT)
Dept: HOME HEALTH SERVICES | Facility: HOME HEALTHCARE | Age: 58
End: 2023-09-07
Payer: COMMERCIAL

## 2023-09-08 ENCOUNTER — HOME CARE VISIT (OUTPATIENT)
Dept: HOME HEALTH SERVICES | Facility: HOME HEALTHCARE | Age: 58
End: 2023-09-08
Payer: COMMERCIAL

## 2023-09-08 VITALS
TEMPERATURE: 97.5 F | DIASTOLIC BLOOD PRESSURE: 72 MMHG | OXYGEN SATURATION: 99 % | HEART RATE: 100 BPM | SYSTOLIC BLOOD PRESSURE: 136 MMHG | RESPIRATION RATE: 18 BRPM

## 2023-09-08 PROCEDURE — G0299 HHS/HOSPICE OF RN EA 15 MIN: HCPCS

## 2023-09-11 ENCOUNTER — APPOINTMENT (OUTPATIENT)
Dept: LAB | Facility: AMBULARY SURGERY CENTER | Age: 58
End: 2023-09-11
Payer: COMMERCIAL

## 2023-09-11 ENCOUNTER — OFFICE VISIT (OUTPATIENT)
Dept: PALLIATIVE MEDICINE | Facility: CLINIC | Age: 58
End: 2023-09-11
Payer: COMMERCIAL

## 2023-09-11 ENCOUNTER — TELEPHONE (OUTPATIENT)
Dept: INTERNAL MEDICINE CLINIC | Facility: CLINIC | Age: 58
End: 2023-09-11

## 2023-09-11 VITALS
BODY MASS INDEX: 26.52 KG/M2 | OXYGEN SATURATION: 98 % | TEMPERATURE: 98.7 F | WEIGHT: 144.1 LBS | DIASTOLIC BLOOD PRESSURE: 68 MMHG | HEIGHT: 62 IN | SYSTOLIC BLOOD PRESSURE: 112 MMHG | HEART RATE: 115 BPM

## 2023-09-11 DIAGNOSIS — T40.2X5A THERAPEUTIC OPIOID-INDUCED CONSTIPATION (OIC): ICD-10-CM

## 2023-09-11 DIAGNOSIS — D86.9 SARCOIDOSIS: ICD-10-CM

## 2023-09-11 DIAGNOSIS — C18.7 MALIGNANT NEOPLASM OF SIGMOID COLON (HCC): Primary | ICD-10-CM

## 2023-09-11 DIAGNOSIS — F41.8 ANXIETY ABOUT HEALTH: ICD-10-CM

## 2023-09-11 DIAGNOSIS — G89.3 CANCER RELATED PAIN: ICD-10-CM

## 2023-09-11 DIAGNOSIS — B02.29 HZV (HERPES ZOSTER VIRUS) POST HERPETIC NEURALGIA: ICD-10-CM

## 2023-09-11 DIAGNOSIS — B02.9 SHINGLES: ICD-10-CM

## 2023-09-11 DIAGNOSIS — K59.03 DRUG-INDUCED CONSTIPATION: ICD-10-CM

## 2023-09-11 DIAGNOSIS — K59.03 THERAPEUTIC OPIOID-INDUCED CONSTIPATION (OIC): ICD-10-CM

## 2023-09-11 DIAGNOSIS — M79.2 NEURALGIA INVOLVING SCALP: ICD-10-CM

## 2023-09-11 DIAGNOSIS — Z51.5 PALLIATIVE CARE PATIENT: ICD-10-CM

## 2023-09-11 PROBLEM — R45.89 ANXIETY ABOUT HEALTH: Status: ACTIVE | Noted: 2023-09-11

## 2023-09-11 PROCEDURE — 99215 OFFICE O/P EST HI 40 MIN: CPT | Performed by: INTERNAL MEDICINE

## 2023-09-11 RX ORDER — DOCUSATE SODIUM 100 MG/1
100 CAPSULE, LIQUID FILLED ORAL 2 TIMES DAILY
Qty: 60 CAPSULE | Refills: 2 | Status: SHIPPED | OUTPATIENT
Start: 2023-09-11

## 2023-09-11 RX ORDER — OXYCODONE HYDROCHLORIDE 5 MG/1
5 TABLET ORAL EVERY 4 HOURS PRN
Qty: 180 TABLET | Refills: 0 | Status: SHIPPED | OUTPATIENT
Start: 2023-09-11

## 2023-09-11 RX ORDER — AMITRIPTYLINE HYDROCHLORIDE 10 MG/1
10 TABLET, FILM COATED ORAL
Qty: 60 TABLET | Refills: 0 | Status: SHIPPED | OUTPATIENT
Start: 2023-09-11

## 2023-09-11 RX ORDER — GABAPENTIN 300 MG/1
300 CAPSULE ORAL EVERY 8 HOURS
Qty: 90 CAPSULE | Refills: 2 | Status: SHIPPED | OUTPATIENT
Start: 2023-09-11

## 2023-09-11 NOTE — PROGRESS NOTES
Follow-up with Palliative and Supportive Care  Chidi Carr 56 y.o. female [id number]    ASSESSMENT & PLAN:  1. Malignant neoplasm of sigmoid colon ([address])    2. Shingles    3. Sarcoidosis    4. Cancer related pain    5. Drug-induced constipation    6. Therapeutic opioid-induced constipation (OIC)    7. Neuralgia involving scalp    8. 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    9. Anxiety about health    10. Palliative care patient          • Continue disease-directed cares. While she is s/p chemoRT and bowel surgery, she has multiple new complaints and does not wish to limit cares. Offered ED evaluation for workup of severe pain, perhaps imaging; she will consider. o 8/23/23 CTA was fairly reassuring although a small amount of (likely post-operative) air was noted in the bladder and in the peritoneum. • Patient c/o severe post-herpetic neuralgia pain which significantly impacts QOL. o Counseled that opioids are not a very effective means of managing this type of pain; opioids are more useful for cancer-related or post-operative pain.  o Increase gabapentin to 300mg q8h ATC. First line.  o Start second-line TCA, amitriptyline. 10mg QHS x 1 week, increase to 20mg QHS if tolerated.  o May use oxyIR PRN; counseled on limits to efficacy of this class for this indication. o Note sent to PCP, should Dr Toshia Pérez think the patient could benefit from antiviral therapy. • Patient endorses recurrent groin / genital pain and dysuria, as well as hematuria. Pain also can interfere w/ QOL. Pain progressive in recent weeks,  o Unknown if this pain is post-operative, is cancer-related, is s/t pelvic RT, or is s/t urinary issues. o Recommended patient call her Urologist and her Colorectal Surgeon ASAP.  o Note cc'd to those providers. o Stop morphine as she states every dose caused nausea.  o Resume oxyIR 5-10mg q4h PRN moderate-severe pain. o Recommend Tylenol for adjunct analgesia.   • Patient significantly anxious re: worsening pain. Amitriptyline may also help with this as well as assist w/ sleep overnight. • Continue Zofran PRN N/V.  • ACP: Patient has not completed any advanced healthcare directives. Advanced directive counseling given. She was provided a copy of the [facility] Advanced Directive along with counseling in a prior visit. She had expressed a desire to have her ex- Eloise Alvarez be listed as a surrogate healthcare decision-maker. She is not ready to complete the advanced directive - she will let us know when she is ready. • Cyclobenzaprine was discontinued since prior visit. • Counseled on bowel regimen for drug-induced constipation. Recommend patient discuss w/ Colorectal Surgery given h/o partial bowel obstruction. • Reviewed notes (DC Summary, Colorectal Surgery, General Surgery, PCP), labs (8/29/23 Cr 0.82; 8/27/23 K 3.0, Cr 0.86, Hb 8.4; alb 3.5 on 8/23/23), imaging + procedures (8/23/23 CTCAP, 8/23/23 XR Abdomen). • Return in about 1 month (around 10/11/2023). • Emotional support provided. • Medication safety issues addressed - no driving under the influence of narcotics (including opioids), watch for adverse effects including AMS or respiratory depression (slowed breathing), keep medications stored in a safe/locked environment, do not use alcohol while opioids or other narcotics are in one's system.       Requested Prescriptions     Signed Prescriptions Disp Refills   • gabapentin (Neurontin) 300 mg capsule 90 capsule 2     Sig: Take 1 capsule (300 mg total) by mouth every 8 (eight) hours To reduce shingles pain   • oxyCODONE (Roxicodone) 5 immediate release tablet 180 tablet 0     Sig: Take 1 tablet (5 mg total) by mouth every 4 (four) hours as needed for moderate pain or severe pain Max Daily Amount: 30 mg   • docusate sodium (COLACE) 100 mg capsule 60 capsule 2     Sig: Take 1 capsule (100 mg total) by mouth 2 (two) times a day - hold if you are experiencing diarrhea   • amitriptyline (ELAVIL) 10 mg tablet 60 tablet 0     Sig: Take 1 tablet (10 mg total) by mouth daily at bedtime       Medications Discontinued During This Encounter   Medication Reason   • morphine (MSIR) 15 mg tablet Side effects   • psyllium (METAMUCIL) packet Therapy completed   • gabapentin (Neurontin) 100 mg capsule Reorder       Representatives have queried the patient's controlled substance dispensing history in the Prescription Drug Monitoring Program in compliance with regulations before I have prescribed any controlled substances. The prescription history is consistent with prescribed therapy and our practice policies. 45+ minutes were spent in this ambulatory visit with greater than 50% of the time spent face to face with patient and her ex- Lyndsay Duarte in counseling or coordination of care including symptom assessment and management, medication review, medication adjustment, psychosocial support, chart review, imaging review, lab review, goals of care, opioid titration, supportive listening and anticipatory guidance. All of the patient's questions were answered during this discussion. SUBJECTIVE:  Chief Complaint   Patient presents with   • Hospital Follow-up   • Medication Refill   • Herpes Zoster   • Cancer   • Pain   • Counseling   • Follow-up     HFU   • Anxiety        HPI    Corinne Herrera is a 56 y.o. 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 She follows w/ Dr Pily Morrison (Radiation Oncology), Dr Lise Torres (Gastroenterology), Dr Dimas Wright (Medical Oncology).     Patient is known to [facility] [place], operated by [facility]; seen 7/19/23 for symptom assessment and management, medication review, medication adjustment, psychosocial support, chart review, imaging review, lab review, goals of care, supportive listening and anticipatory guidance. Seen by our inpatient consult service during an 8/17-21/23 admission for diagnostic laparoscopy with loop colostomy reversal and diverting loop ileostomy. Subsequently admitted 8/23/23 for partial SBO. Patient endorses severe, waxing and waning, Shingles-related pain in her left ear and scalp. This is worse if she scratches or touches the area (which she tries to avoid doing). She states her PCP evaluated this and resumed gabapentin on 8/30/23 (which she had been prescribed but had not been taking). Today her ex- Karen House states she has been using this more PRN than ATC. She reports none of her analgesics are helping w/ this pain. She also reports a new pain "in my genitals" - though the location of pain is vague it may be intrapelvic. She endorses recurrent hematuria as well. She states every dose of morphine she took at home made her nauseous, so she stopped taking it and resumed use of her prior oxyIR Rx. She states she is taking "10mg every 4 hours" without much relief, but she indicates the neuralgia when she talks about efficacy - not the pelvic pain. Patient reports she is having regular (in timing) bowel movements which are soft - and can be "liquidy" at times. She wishes to continue Colace and asks that an Rx be sent to the pharmacy. She endorses recurring hematuria. PDMP shows no concerns. The following portions of the medical history were reviewed: past medical history, surgical history, problem list, medication list, family history, and social history.       Current Outpatient Medications:   •  acetaminophen (TYLENOL) 500 mg tablet, Take 2 tablets (1,000 mg total) by mouth 3 (three) times a day, Disp: 180 tablet, Rfl: 2  •  amitriptyline (ELAVIL) 10 mg tablet, Take 1 tablet (10 mg total) by mouth daily at bedtime, Disp: 60 tablet, Rfl: 0  •  amLODIPine (NORVASC) 10 mg tablet, Take 1 tablet (10 mg total) by mouth daily (Patient taking differently: Take 20 mg by mouth daily), Disp: 90 tablet, Rfl: 3  •  cyanocobalamin (VITAMIN B-12) 1000 MCG tablet, Take 1,000 mcg by mouth daily, Disp: , Rfl:   •  docusate sodium (COLACE) 100 mg capsule, Take 1 capsule (100 mg total) by mouth 2 (two) times a day - hold if you are experiencing diarrhea, Disp: 60 capsule, Rfl: 2  •  enoxaparin (LOVENOX) 40 mg/0.4 mL, Inject 0.4 mL (40 mg total) under the skin every 24 hours for 24 days, Disp: 9.6 mL, Rfl: 0  •  gabapentin (Neurontin) 300 mg capsule, Take 1 capsule (300 mg total) by mouth every 8 (eight) hours To reduce shingles pain, Disp: 90 capsule, Rfl: 2  •  ondansetron (ZOFRAN) 4 mg tablet, Take 1 tablet (4 mg total) by mouth every 8 (eight) hours as needed for nausea or vomiting, Disp: 20 tablet, Rfl: 0  •  oxyCODONE (Roxicodone) 5 immediate release tablet, Take 1 tablet (5 mg total) by mouth every 4 (four) hours as needed for moderate pain or severe pain Max Daily Amount: 30 mg, Disp: 180 tablet, Rfl: 0  •  predniSONE 10 mg tablet, Take 4 tablets (40 mg total) by mouth daily Please take for 4 weeks and then decrease by 5 mg increments every 2 weeks. (Patient taking differently: Take 30 mg by mouth daily Please take for 4 weeks and then decrease by 5 mg increments every 2 weeks.  -- Taking 20mg and then 10mg - titrating), Disp: 500 tablet, Rfl: 0  •  ursodiol (ACTIGALL) 300 mg capsule, Take 3 capsules (900 mg total) by mouth in the morning, Disp: 90 capsule, Rfl: 11  •  methocarbamol (ROBAXIN) 500 mg tablet, Take 1 tablet (500 mg total) by mouth every 8 (eight) hours for 5 days, Disp: 15 tablet, Rfl: 0  •  oxybutynin (DITROPAN) 5 mg tablet, Take 1 tablet (5 mg total) by mouth every 6 (six) hours as needed (bladder spasms) (Patient not taking: Reported on 8/24/2023), Disp: 30 tablet, Rfl: 0  •  potassium chloride (MICRO-K) 10 MEQ CR capsule, Take 4 capsules (40 mEq total) by mouth 2 (two) times a day, Disp: 240 capsule, Rfl: 0    Review of Systems   Constitutional: Positive for activity change and fatigue. Gastrointestinal: Positive for nausea (when taking morphine). Loose stools, on a regular schedule. Genitourinary: Positive for dysuria, hematuria and pelvic pain (waxes and wanes, progressive). Skin:        Scalp pain. L ear pain. Allergic/Immunologic: Positive for immunocompromised state. Psychiatric/Behavioral: Positive for dysphoric mood and sleep disturbance. The patient is nervous/anxious. All other systems reviewed and are negative. OBJECTIVE:  /68 (BP Location: Left arm, Patient Position: Sitting, Cuff Size: Standard)   Pulse (!) 115   Temp 98.7 °F (37.1 °C) (Temporal)   Ht 5' 2" (1.575 m)   Wt 65.4 kg (144 lb 1.6 oz)   SpO2 98%   BMI 26.36 kg/m²   Physical Exam  Vitals reviewed. Constitutional:       General: She is not in acute distress. Appearance: She is well-groomed and normal weight. She is not toxic-appearing. HENT:      Head: Normocephalic and atraumatic. Right Ear: External ear normal.      Left Ear: External ear normal.   Eyes:      General: No scleral icterus. Right eye: No discharge. Left eye: No discharge. Extraocular Movements: Extraocular movements intact. Conjunctiva/sclera: Conjunctivae normal.      Pupils: Pupils are equal, round, and reactive to light. Cardiovascular:      Rate and Rhythm: Tachycardia present. Pulmonary:      Effort: Pulmonary effort is normal. No tachypnea, bradypnea, accessory muscle usage or respiratory distress. Comments: Able to speak in short phrases on room air at rest.  Abdominal:      General: There is no distension. Tenderness: There is no guarding. Musculoskeletal:      Cervical back: Normal range of motion. Right lower leg: No edema. Left lower leg: No edema. Skin:     General: Skin is dry. Coloration: Skin is not pale. Neurological:      Mental Status: She is alert and oriented to person, place, and time.       Cranial Nerves: No dysarthria or facial asymmetry. Comments: Using no assistive devices for ambulation. Psychiatric:         Attention and Perception: Attention normal.         Mood and Affect: Mood is anxious and depressed. Affect is tearful. Speech: Speech normal.         Behavior: Behavior normal. Behavior is cooperative. Thought Content: Thought content normal.         Cognition and Memory: Cognition and memory normal.          Justo Londono MD  [facility]  [phone]    Portions of this document may have been created using dictation software and as such some "sound alike" terms may have been generated by the system. Do not hesitate to contact me with any questions or clarifications.

## 2023-09-11 NOTE — TELEPHONE ENCOUNTER
----- Message from Homer Villegas sent at 9/11/2023  1:46 PM EDT -----  Regarding: FW: Blood in urine  Contact: [phone]    ----- Message -----  From: Gia Wilcox  Sent: 9/11/2023  12:38 PM EDT  To: [facility]  Subject: Blood in urine                                   Hello. I am sending this to both my primary doctor (Dr Ernie Mccormick) and my cancer surgeon (Dr Nick Lujan). About a week ago (maybe less) I noticed some blood in my urine. I also have some genital pain (not a burning pain). Both the blood and the pain are intermittent. Pain level is about a 3. Blood quantity is light to moderate. I can get pain during urination or at random times. I can get the pain without having a need to urinate. Was wondering if this is cause for concern. Thank you. Gia Wilcox    Written by Shima Corral on Danielle's behalf.

## 2023-09-11 NOTE — LETTER
September 11, 2023     Nazario Carrera MD  [id number] [address]    Patient: Mercy Arrington   YOB: 1965   Date of Visit: 9/11/2023       Dear Dr. Ge Eli: Thank you for referring Mercy Arrington to me for evaluation. Below are my notes for this consultation. If you have questions, please do not hesitate to call me. I look forward to following your patient along with you.          Sincerely,        Indu Reyes MD        CC: MD Kalyani Ellsworth MD

## 2023-09-11 NOTE — PATIENT INSTRUCTIONS
It was good to see you today. Thank you for coming in. Increase gabapentin to 300mg three times per day, every day. Stop morphine as it causes nausea. Resume oxycodone, 5 to 10mg q4h as needed for moderate to severe pain. This medication is not very effecting for Shingles pain but may help with cancer-related or post-operative pain. Call your surgeon Dr Janice Yen ASAP as you are complaining of rising groin / genital pain - he may recommend you to a different provider but he would be the person I recommend calling first. Dr Delmis Delgado number is [phone]. Call Dr [facility] about the groin / urinary pain as well. Call your PCP Dr Melani Marcum to think if he feels you might benefit from an antiviral.  Start amitriptyline, intended to help with mood as well as Shingles pain. Take 10mg at bedtime for a week, if tolerated increase to 20mg at bedtime after the first week. When we use opioids for pain control, constipation is common and patients should act to prevent it. I strongly recommend you discuss your bowel regimen w/ Dr Janice Yen. Drink PLENTY of water. This is important to keep the gut moving. Some people have success w/ using prunes, prune juice, certain fruits or vegetables (apples, bananas, prunes, pears, raspberries, and vegetables like string beans, broccoli, spinach, kale, squash, lentils, peas, and beans), or fiber gummies. Try a probiotic. This could be yogurt or kefir, or fermented beverages such as kombucha, but probiotics are also available in capsule form. Aim for 10-15 billion colony-forming-units, w/ bacteria such as Lactobacillus / Saccharomyces / Actinomyces. Osmotic laxatives (Miralax, magnesium citrate, Milk of Magnesia) can be very useful for opioid-induced constipation (OIC); take daily to prevent OIC.   Bulk laxatives (Citrucel, Metamucil, Fibercon, Benefiber, wheat germ) are useful for constipation in patients who are not taking opioids, but are not recommended if you are taking opioids. Colace is good for softening hard stools, or preventing constipation when opioids are being used - but does not stimulate the bowel to move things along once constipation has occurred. You can use senna, 1 to 2 tabs, once or twice daily as needed for constipation. Use as directed on the box/bottle. Senna is also available in a tea ("Smooth Move"). Should that not be enough for your constipation, you can try Dulcolax. Should that not be enough, consider an enema. All of these medications are available over-the-counter. Return in about 1 month (around 10/11/2023). Call us for refills on medications that we supply, as needed. If something changes and you need to come in sooner, please call our office. PRESCRIPTION REFILL REMINDER:  All medication refills should be requested prior to [facility] on Friday. Any refill requests after noon on Friday would be addressed the following Monday. MEDICATION SAFETY ISSUES:  Do not drive under the influence of narcotics (including opioids), watch for adverse effects including confusion / altered mental status / respiratory depression (slowed breathing), keep medications stored in a safe/locked environment, do not use alcohol while opioids or other narcotics are in your system.

## 2023-09-12 ENCOUNTER — OFFICE VISIT (OUTPATIENT)
Dept: INTERNAL MEDICINE CLINIC | Facility: CLINIC | Age: 58
End: 2023-09-12
Payer: COMMERCIAL

## 2023-09-12 VITALS
SYSTOLIC BLOOD PRESSURE: 112 MMHG | HEART RATE: 112 BPM | HEIGHT: 62 IN | WEIGHT: 144 LBS | OXYGEN SATURATION: 98 % | BODY MASS INDEX: 26.5 KG/M2 | DIASTOLIC BLOOD PRESSURE: 68 MMHG

## 2023-09-12 DIAGNOSIS — N39.0 ACUTE UTI: Primary | ICD-10-CM

## 2023-09-12 LAB
SL AMB  POCT GLUCOSE, UA: ABNORMAL
SL AMB LEUKOCYTE ESTERASE,UA: ABNORMAL
SL AMB POCT BILIRUBIN,UA: ABNORMAL
SL AMB POCT BLOOD,UA: ABNORMAL
SL AMB POCT CLARITY,UA: ABNORMAL
SL AMB POCT COLOR,UA: ABNORMAL
SL AMB POCT KETONES,UA: ABNORMAL
SL AMB POCT NITRITE,UA: ABNORMAL
SL AMB POCT PH,UA: 6
SL AMB POCT SPECIFIC GRAVITY,UA: 1.03
SL AMB POCT URINE PROTEIN: ABNORMAL
SL AMB POCT UROBILINOGEN: ABNORMAL

## 2023-09-12 PROCEDURE — 81002 URINALYSIS NONAUTO W/O SCOPE: CPT | Performed by: INTERNAL MEDICINE

## 2023-09-12 PROCEDURE — 99213 OFFICE O/P EST LOW 20 MIN: CPT | Performed by: INTERNAL MEDICINE

## 2023-09-12 PROCEDURE — 87086 URINE CULTURE/COLONY COUNT: CPT | Performed by: INTERNAL MEDICINE

## 2023-09-12 RX ORDER — CEFADROXIL 500 MG/1
500 CAPSULE ORAL 2 TIMES DAILY
Qty: 14 CAPSULE | Refills: 0 | Status: SHIPPED | OUTPATIENT
Start: 2023-09-12 | End: 2023-09-19

## 2023-09-12 NOTE — PROGRESS NOTES
Name: Rocky Polanco      : 1965      MRN: [id number]  Encounter Provider: Shakira Musa MD  Encounter Date: 2023   Encounter department: [facility]    Assessment & Plan     1. Acute UTI  Assessment & Plan:  - POC urine dip suggestive of acute UTI. 2+ leukocytes, 3+ blood, 2+ protein  - Patient will be started on cefadroxil 500 mg twice daily x7 days  - Her urine has been sent to our lab for culture    Orders:  -     POCT urine dip  -     Urine culture; Future  -     Urine culture  -     cefadroxil (DURICEF) 500 mg capsule; Take 1 capsule (500 mg total) by mouth 2 (two) times a day for 7 days  -     Urine culture; Future           Subjective      HPI  Patient presents today as an acute visit complaining of intermittent burning with urination as well as blood in her urine over the past week. She also endorses pressure over her bladder. She denies any fevers. All other systems negative except for pertinent findings noted in HPI.        Current Outpatient Medications on File Prior to Visit   Medication Sig   • acetaminophen (TYLENOL) 500 mg tablet Take 2 tablets (1,000 mg total) by mouth 3 (three) times a day   • amitriptyline (ELAVIL) 10 mg tablet Take 1 tablet (10 mg total) by mouth daily at bedtime   • amLODIPine (NORVASC) 10 mg tablet Take 1 tablet (10 mg total) by mouth daily (Patient taking differently: Take 20 mg by mouth daily)   • cyanocobalamin (VITAMIN B-12) 1000 MCG tablet Take 1,000 mcg by mouth daily   • docusate sodium (COLACE) 100 mg capsule Take 1 capsule (100 mg total) by mouth 2 (two) times a day - hold if you are experiencing diarrhea   • enoxaparin (LOVENOX) 40 mg/0.4 mL Inject 0.4 mL (40 mg total) under the skin every 24 hours for 24 days   • gabapentin (Neurontin) 300 mg capsule Take 1 capsule (300 mg total) by mouth every 8 (eight) hours To reduce shingles pain   • ondansetron (ZOFRAN) 4 mg tablet Take 1 tablet (4 mg total) by mouth every 8 (eight) hours as needed for nausea or vomiting   • oxyCODONE (Roxicodone) 5 immediate release tablet Take 1 tablet (5 mg total) by mouth every 4 (four) hours as needed for moderate pain or severe pain Max Daily Amount: 30 mg   • predniSONE 10 mg tablet Take 4 tablets (40 mg total) by mouth daily Please take for 4 weeks and then decrease by 5 mg increments every 2 weeks. (Patient taking differently: Take 30 mg by mouth daily Please take for 4 weeks and then decrease by 5 mg increments every 2 weeks.  -- Taking 20mg and then 10mg - titrating)   • ursodiol (ACTIGALL) 300 mg capsule Take 3 capsules (900 mg total) by mouth in the morning   • methocarbamol (ROBAXIN) 500 mg tablet Take 1 tablet (500 mg total) by mouth every 8 (eight) hours for 5 days   • oxybutynin (DITROPAN) 5 mg tablet Take 1 tablet (5 mg total) by mouth every 6 (six) hours as needed (bladder spasms) (Patient not taking: Reported on 8/24/2023)   • potassium chloride (MICRO-K) 10 MEQ CR capsule Take 4 capsules (40 mEq total) by mouth 2 (two) times a day       Objective     /68   Pulse (!) 112   Ht 5' 2" (1.575 m)   Wt 65.3 kg (144 lb)   SpO2 98%   BMI 26.34 kg/m²     BP Readings from Last 3 Encounters:   09/12/23 112/68   09/11/23 112/68   09/08/23 136/72        Wt Readings from Last 3 Encounters:   09/12/23 65.3 kg (144 lb)   09/11/23 65.4 kg (144 lb 1.6 oz)   08/30/23 65.3 kg (144 lb)       Physical Exam    General: NAD, Alert and oriented x3   HEENT: NCAT, EOMI, normal conjunctiva  Cardiovascular: RRR, normal S1 and S2, no m/r/g  Pulmonary: Normal respiratory effort, no wheezes, rales or rhonchi  GI: Soft, ostomy in right lower quadrant, suprapubic tenderness  Skin: Normal skin color, no rashes     Eusebio Arellano MD

## 2023-09-12 NOTE — ASSESSMENT & PLAN NOTE
- POC urine dip suggestive of acute UTI.   2+ leukocytes, 3+ blood, 2+ protein  - Patient will be started on cefadroxil 500 mg twice daily x7 days  - Her urine has been sent to our lab for culture

## 2023-09-14 LAB — BACTERIA UR CULT: NORMAL

## 2023-09-15 NOTE — RESULT ENCOUNTER NOTE
Spoke with pt and her  I  advised the results , pt stated her symptoms are improving , the pain is not longer there and no blood in her urine

## 2023-09-21 ENCOUNTER — OFFICE VISIT (OUTPATIENT)
Dept: GASTROENTEROLOGY | Facility: AMBULARY SURGERY CENTER | Age: 58
End: 2023-09-21
Payer: COMMERCIAL

## 2023-09-21 ENCOUNTER — APPOINTMENT (OUTPATIENT)
Dept: LAB | Facility: AMBULARY SURGERY CENTER | Age: 58
End: 2023-09-21
Payer: COMMERCIAL

## 2023-09-21 VITALS
SYSTOLIC BLOOD PRESSURE: 114 MMHG | BODY MASS INDEX: 27.23 KG/M2 | HEART RATE: 103 BPM | DIASTOLIC BLOOD PRESSURE: 70 MMHG | HEIGHT: 62 IN | OXYGEN SATURATION: 98 % | WEIGHT: 148 LBS

## 2023-09-21 DIAGNOSIS — N39.0 ACUTE UTI: ICD-10-CM

## 2023-09-21 DIAGNOSIS — D86.89 HEPATIC GRANULOMA ASSOCIATED WITH SARCOIDOSIS: ICD-10-CM

## 2023-09-21 DIAGNOSIS — N30.01 ACUTE CYSTITIS WITH HEMATURIA: Primary | ICD-10-CM

## 2023-09-21 DIAGNOSIS — N30.01 ACUTE CYSTITIS WITH HEMATURIA: ICD-10-CM

## 2023-09-21 DIAGNOSIS — R79.89 ABNORMAL LIVER FUNCTION TESTS: ICD-10-CM

## 2023-09-21 LAB
BACTERIA UR QL AUTO: ABNORMAL /HPF
BILIRUB UR QL STRIP: NEGATIVE
CLARITY UR: ABNORMAL
COLOR UR: ABNORMAL
GLUCOSE UR STRIP-MCNC: NEGATIVE MG/DL
HGB UR QL STRIP.AUTO: ABNORMAL
KETONES UR STRIP-MCNC: NEGATIVE MG/DL
LEUKOCYTE ESTERASE UR QL STRIP: ABNORMAL
MUCOUS THREADS UR QL AUTO: ABNORMAL
NITRITE UR QL STRIP: NEGATIVE
NON-SQ EPI CELLS URNS QL MICRO: ABNORMAL /HPF
PH UR STRIP.AUTO: 6 [PH]
PROT UR STRIP-MCNC: ABNORMAL MG/DL
RBC #/AREA URNS AUTO: ABNORMAL /HPF
SP GR UR STRIP.AUTO: 1.01 (ref 1–1.03)
UROBILINOGEN UR STRIP-ACNC: <2 MG/DL
WBC #/AREA URNS AUTO: ABNORMAL /HPF

## 2023-09-21 PROCEDURE — 99214 OFFICE O/P EST MOD 30 MIN: CPT | Performed by: STUDENT IN AN ORGANIZED HEALTH CARE EDUCATION/TRAINING PROGRAM

## 2023-09-21 PROCEDURE — 87086 URINE CULTURE/COLONY COUNT: CPT

## 2023-09-21 PROCEDURE — 81001 URINALYSIS AUTO W/SCOPE: CPT | Performed by: STUDENT IN AN ORGANIZED HEALTH CARE EDUCATION/TRAINING PROGRAM

## 2023-09-21 RX ORDER — PREDNISONE 10 MG/1
10 TABLET ORAL DAILY
Qty: 30 TABLET | Refills: 2
Start: 2023-09-21 | End: 2023-12-20

## 2023-09-21 RX ORDER — PREDNISONE 10 MG/1
15 TABLET ORAL DAILY
Start: 2023-09-21 | End: 2023-09-21

## 2023-09-21 NOTE — PROGRESS NOTES
[facility] - Outpatient Consultation  Melania Leonard 56 y.o. female MRN: [id number]  Encounter: [id number]    PCP:  Everett Garcia MD, [phone]  Referring Provider:  Everett Garcia MD, [phone]    Patient: Melania Leonard, 1965  Reason for Referral: hepatic sarcoidosis     ASSESSMENT/PLAN:  56 y.o. 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      She was noted to have progressive cholestasis dating back to December 2022 with intraoperative findings of diffuse hepatic nodules suggestive of cirrhosis. She underwent a wedge biopsy of segment 3 which showed hyalinized subcapsular and intra-parenchymal nodules with no malignancy. There was mild macrovesicular steatosis without steatohepatitis or advanced fibrosis. Pathology was suggestive of a sclerotic hemangioma with secondary hemochromatosis.     She had a repeat biopsy for liver lesion and was found to have non-necrotizing granulomas consistent with hepatic involvement of her sarcoidosis. She was also found to have sinusoidal dilatation and perisinusoidal fibrosis, possibly related to [address] in the context of chemotherapy exposure. She was started on prednisone and UDCA with improvement of her liver enzymes and is also followed by rheumatology. Will continue to wean her immunosuppression as able we discussed that she will need to remain on UDCA for maintenance. She was noted to have recrudescence in liver injury on most recent labs but this may be related to DILI given recent antibiotic exposure. She will return to clinic in 4-6 months or sooner if needed.  Thank you for the opportunity to consult in her care.      - Continue prednisone 10 mg and wean by 5 mg in 2 weeks; may continue prednisone 5 mg daily if her liver tests are abnormal   - Continue UDCA 900 mg daily  - CBC, CMP and INR every 2 weeks   - ESR/CRP and ACE every 2 weeks     Maci Barber MD  Division of Gastroenterology and Hepatology  [address]    ============================================================================  CC/HPI: 56 y.o. 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     Interval events  - Underwent LAR with colostomy reversal, diverting loop ileostomy with Dr. Farzana Reza in August. Course complicated by pSBO which improved with conservative management.   - Started on gabapentin for nerve pain after shingles episode  - Also treated with cefadroxil for recent UTI  - Most recently labs showed elevation in ALP to 376      Extended liver history  She was diagnosed with pulmonary sarcoidosis in June 2022 when she was noted to have hypercalcemia and found to have diffuse micronodules involving both lungs with bilateral hilar, retroperitoneal and inguinal lymphadenopathy on PET/CT. There was no abnormal activity in the hepatobiliary system but there were reports of focal hypermetabolic activity in the lower sigmoid colon for which colonoscopy was recommended but no pursued. She had a left VATS and pleural biopsy in June 2022 which showed non-caseating granulomatous inflammation negative for Mycobacteria and fungal infections. She was started on prednisone in July 2022.     In October 2022, she was hospitalized for abdominal pain and was found to have sigmoid colon mass.  She underwent COY in 10/28/2022 which showed malignant partially obstructing tumor in the rectosigmoid colon for which biopsies were positive for adenocarcinoma. She underwent a diverting colostomy in December 2022 when she presented with obstructive symptoms and was noted to have abnormal morphology of the liver.  Her liver had cirrhotic appearance with micronodular pattern through both lobes during her colostomy and underwent a wedge biopsy of segment 3. Pathology showed hyalized subcapsular and intra-parenchymal nodules with no malignancy but mild macrovesicular steatosis without steatohepatitis but mild portal fibrosis seen. Findings were most consistent with sclerotic hemangioma with secondary hemochromatosis.       She was started on oxalipatin which was then switched to 5-FU in 4/2023.      She was noted to have progressive cholestasis in December 2022 with recent . More recently, she had an MRI abdomen in March 2023 and was found to have a 1.6 cm segment 6 lesion with hypoenhancement in the arterial and portal venous phase with isointensity in the delayed phase, concerning for metastases. Liver biopsy showed mild steatosis and sinusoidal dilatation with fibrosis and focal non-necrotizing granulomas, suggestive of sarcoid. She was started on prednisone and UDCA for hepatic sarcoid with improvement of her liver tests     She denies excessive EtOH consumption and recreational drug use. She has no occupational exposures and denies supplement use. She has a family history of alcohol cirrhosis.     She is otherwise asymptomatic and denies jaundice, prurtius, darkened urine and acholic stools.       ROS: Complete review of systems otherwise negative.      PAST MEDICAL/SURGICAL HISTORY:  Past Medical History:   Diagnosis Date   • Cancer [facility])    • Colon cancer ([address])    • Fall    • Headache    • Hemochromatosis, unspecified    • History of transfusion     2022 - no adverse reaction   • Hypertension    • Kidney calculi    • Kidney stone    • Liver disease     hemangioma   • Muscle weakness    • Personal history of COVID-19 12/2022   • Sarcoidosis    • Seizures ([address])     2022        Past Surgical History:   Procedure Laterality Date   • ABSCESS DRAINAGE      left breast   • BREAST BIOPSY     • CHEST TUBE INSERTION     • COLON SURGERY      colostomy   • COLONOSCOPY     • FL GUIDED CENTRAL VENOUS ACCESS DEVICE INSERTION  03/21/2023   • FL RETROGRADE PYELOGRAM  01/23/2023   • FL RETROGRADE PYELOGRAM  04/03/2023   • FL RETROGRADE PYELOGRAM  7/21/2023   • IR BIOPSY LIVER MASS  05/09/2023   • LUNG BIOPSY     • TX CYSTO BLADDER W/URETERAL CATHETERIZATION Bilateral 07/21/2023    Procedure: CYSTOSCOPY URETEROSCOPY RETROGRADE PYELOGRAM WITH BILATERAL STENT URETERAL EXCHANGE; LEFT LASER LITHOTRIPSY AND STONE BASKET RETRIEVAL;  Surgeon: Albina Marte MD;  [facility];  Service: Urology   • TX CYSTO/URETERO W/LITHOTRIPSY &INDWELL STENT INSRT Bilateral 01/23/2023    Procedure: CYSTOSCOPY  RIGHT URETEROSCOPY WITH LITHOTRIPSY HOLMIUM LASER, BILATERAL RETROGRADE PYELOGRAM AND BILATERAL  URETERAL STENT INSERTION;  Surgeon: Albina Marte MD;  [facility];  Service: Urology   • TX CYSTO/URETERO W/LITHOTRIPSY &INDWELL STENT INSRT Right 04/03/2023    Procedure: RIGHT URETEROSCOPY WITH LITHOTRIPSY HOLMIUM LASER, RETROGRADE PYELOGRAM; BILATERAL EXCHANGE STENT URETERAL;  Surgeon: Albina Marte MD;  [facility];  Service: Urology   • TX INSJ TUNNELED CTR VAD W/SUBQ PORT AGE 5 YR/> N/A 03/21/2023    Procedure: INSERTION VENOUS PORT ( PORT-A-CATH) IR;  Surgeon: Maylin Gaines DO;  [facility]: [facility];  Service: Interventional Radiology   • TX LAPS COLECTOMY PRTL W/COLOPXTSTMY LW ANAST N/A 8/17/2023    Procedure: RESECTION COLON LOW ANTERIOR LAPAROSCOPIC WITH ROBOTICS;  Surgeon: Yaima Woods MD;  Location: [facility];  Service: Colorectal   • TONSILLECTOMY     • URINARY SURGERY Bilateral     bilateral stents       FAMILY/SOCIAL HISTORY:  Family History   Problem Relation Age of Onset   • Cancer Mother    • Cirrhosis Father    • Breast cancer Neg Hx    • Breast cancer additional onset Neg Hx        Social History     Tobacco Use   • Smoking status: Never     Passive exposure: Past   • Smokeless tobacco: Never   Vaping Use   • Vaping Use: Never used   Substance Use Topics   • Alcohol use: Never   • Drug use: Never       MEDICATIONS:  Current Outpatient Medications on File Prior to Visit   Medication Sig Dispense Refill   • acetaminophen (TYLENOL) 500 mg tablet Take 2 tablets (1,000 mg total) by mouth 3 (three) times a day 180 tablet 2   • amitriptyline (ELAVIL) 10 mg tablet Take 1 tablet (10 mg total) by mouth daily at bedtime 60 tablet 0   • amLODIPine (NORVASC) 10 mg tablet Take 1 tablet (10 mg total) by mouth daily (Patient taking differently: Take 20 mg by mouth daily) 90 tablet 3   • cyanocobalamin (VITAMIN B-12) 1000 MCG tablet Take 1,000 mcg by mouth daily     • docusate sodium (COLACE) 100 mg capsule Take 1 capsule (100 mg total) by mouth 2 (two) times a day - hold if you are experiencing diarrhea 60 capsule 2   • enoxaparin (LOVENOX) 40 mg/0.4 mL Inject 0.4 mL (40 mg total) under the skin every 24 hours for 24 days 9.6 mL 0   • gabapentin (Neurontin) 300 mg capsule Take 1 capsule (300 mg total) by mouth every 8 (eight) hours To reduce shingles pain 90 capsule 2   • methocarbamol (ROBAXIN) 500 mg tablet Take 1 tablet (500 mg total) by mouth every 8 (eight) hours for 5 days 15 tablet 0   • ondansetron (ZOFRAN) 4 mg tablet Take 1 tablet (4 mg total) by mouth every 8 (eight) hours as needed for nausea or vomiting 20 tablet 0   • oxybutynin (DITROPAN) 5 mg tablet Take 1 tablet (5 mg total) by mouth every 6 (six) hours as needed (bladder spasms) 30 tablet 0   • oxyCODONE (Roxicodone) 5 immediate release tablet Take 1 tablet (5 mg total) by mouth every 4 (four) hours as needed for moderate pain or severe pain Max Daily Amount: 30 mg 180 tablet 0   • potassium chloride (MICRO-K) 10 MEQ CR capsule Take 4 capsules (40 mEq total) by mouth 2 (two) times a day 240 capsule 0   • predniSONE 10 mg tablet Take 4 tablets (40 mg total) by mouth daily Please take for 4 weeks and then decrease by 5 mg increments every 2 weeks.  (Patient taking differently: Take 30 mg by mouth daily Please take for 4 weeks and then decrease by 5 mg increments every 2 weeks. -- Taking 20mg and then 10mg - titrating) 500 tablet 0   • ursodiol (ACTIGALL) 300 mg capsule Take 3 capsules (900 mg total) by mouth in the morning 90 capsule 11     No current facility-administered medications on file prior to visit. Allergies   Allergen Reactions   • Oxaliplatin Shortness Of Breath     Reactions occurred with 2nd and 3rd infusions (about 1-3 hours from initiation of infusion) and required treatment with steroids and antihistamines. Please refer to allergy note on 2/7/2023 for detailed description of her reactions.    • Morphine Nausea Only     "Every dose made me nauseous" (oral dosing)       PHYSICAL EXAM:  /70 (BP Location: Left arm, Patient Position: Sitting, Cuff Size: Standard)   Pulse 103   Ht 5' 2" (1.575 m)   Wt 67.1 kg (148 lb)   SpO2 98%   BMI 27.07 kg/m²   GENERAL: NAD, AAO  HEENT: anicteric, OP clear, MMM  ABDOMEN: S/ND/NT, normoactive BS, no hepatomegaly, spleen not palpable; surgical incision c/d/i   EXTREMITIES: no edema  SKIN: no rashes, no palmar erythema, no spider angiomata   NEURO: normal gait, no tremor, no asterixis     LABS/RADIOLOGY/ENDOSCOPY:  Lab Results   Component Value Date    WBC 11.03 (H) 09/11/2023    HGB 9.9 (L) 09/11/2023    HCT 33.0 (L) 09/11/2023     09/11/2023    GLUF 97 07/26/2023    BUN 24 09/11/2023    CREATININE 0.87 09/11/2023    K 4.1 09/11/2023     09/11/2023    CO2 23 09/11/2023    ALKPHOS 376 (H) 09/11/2023    ALT 77 (H) 09/11/2023    AST 35 09/11/2023    CALCIUM 9.5 09/11/2023    EGFR 75 09/11/2023    INR 0.97 09/11/2023    PTT 31 08/23/2023       MELD 3.0: 7 at 9/11/2023 10:54 AM  MELD-Na: 6 at 9/11/2023 10:54 AM  Calculated from:  Serum Creatinine: 0.87 mg/dL (Using min of 1 mg/dL) at 9/11/2023 10:54 AM  Serum Sodium: 138 mmol/L (Using max of 137 mmol/L) at 9/11/2023 10:54 AM  Total Bilirubin: 0.59 mg/dL (Using min of 1 mg/dL) at 9/11/2023 10:54 AM  Serum Albumin: 3.9 g/dL (Using max of 3.5 g/dL) at 9/11/2023 10:54 AM  INR(ratio): 0.97 (Using min of 1) at 9/11/2023 10:54 AM  Age at listing (hypothetical): 57 years  Sex: Female at 9/11/2023 10:54 AM

## 2023-09-22 ENCOUNTER — TELEPHONE (OUTPATIENT)
Age: 58
End: 2023-09-22

## 2023-09-22 DIAGNOSIS — N39.0 ACUTE UTI: Primary | ICD-10-CM

## 2023-09-22 LAB — BACTERIA UR CULT: NORMAL

## 2023-09-22 RX ORDER — CEFADROXIL 500 MG/1
500 CAPSULE ORAL EVERY 12 HOURS SCHEDULED
Qty: 6 CAPSULE | Refills: 0 | Status: SHIPPED | OUTPATIENT
Start: 2023-09-22 | End: 2023-09-22

## 2023-09-22 NOTE — TELEPHONE ENCOUNTER
Spoke to Eloise Alvarez  And advised patient to go to the nearest urgent care regarding uti symptoms per . Patient aware and understands.

## 2023-09-22 NOTE — TELEPHONE ENCOUNTER
Spoke to patient's  and notified him I just reviewed her urine studies from yesterday that were ordered by another provider. Her urine culture is negative for UTI. Her  also states the patient's urinary symptoms have improved today. Recommended they hold off on going to urgent care. Given her persistent hematuria and known ureteral stents I recommended they contact urology on Monday to schedule follow-up.

## 2023-09-24 ENCOUNTER — TELEPHONE (OUTPATIENT)
Dept: INTERNAL MEDICINE CLINIC | Facility: CLINIC | Age: 58
End: 2023-09-24

## 2023-09-25 ENCOUNTER — HOSPITAL ENCOUNTER (OUTPATIENT)
Dept: INFUSION CENTER | Facility: CLINIC | Age: 58
Discharge: HOME | End: 2023-09-25
Payer: COMMERCIAL

## 2023-09-25 DIAGNOSIS — Z95.828 PORT-A-CATH IN PLACE: Primary | ICD-10-CM

## 2023-09-25 DIAGNOSIS — D86.89 HEPATIC GRANULOMA ASSOCIATED WITH SARCOIDOSIS: ICD-10-CM

## 2023-09-25 LAB
ALBUMIN SERPL BCP-MCNC: 3.8 G/DL (ref 3.5–5)
ALP SERPL-CCNC: 310 U/L (ref 34–104)
ALT SERPL W P-5'-P-CCNC: 76 U/L (ref 7–52)
ANION GAP SERPL CALCULATED.3IONS-SCNC: 7 MMOL/L
AST SERPL W P-5'-P-CCNC: 37 U/L (ref 13–39)
BASOPHILS # BLD AUTO: 0.03 THOUSANDS/ÂΜL (ref 0–0.1)
BASOPHILS NFR BLD AUTO: 1 % (ref 0–1)
BILIRUB SERPL-MCNC: 0.63 MG/DL (ref 0.2–1)
BUN SERPL-MCNC: 23 MG/DL (ref 5–25)
CALCIUM SERPL-MCNC: 9.8 MG/DL (ref 8.4–10.2)
CHLORIDE SERPL-SCNC: 104 MMOL/L (ref 96–108)
CO2 SERPL-SCNC: 26 MMOL/L (ref 21–32)
CREAT SERPL-MCNC: 0.82 MG/DL (ref 0.6–1.3)
CRP SERPL QL: 11.8 MG/L
EOSINOPHIL # BLD AUTO: 0.21 THOUSAND/ÂΜL (ref 0–0.61)
EOSINOPHIL NFR BLD AUTO: 3 % (ref 0–6)
ERYTHROCYTE [DISTWIDTH] IN BLOOD BY AUTOMATED COUNT: 14.2 % (ref 11.6–15.1)
ERYTHROCYTE [SEDIMENTATION RATE] IN BLOOD: 51 MM/HOUR (ref 0–29)
GFR SERPL CREATININE-BSD FRML MDRD: 80 ML/MIN/1.73SQ M
GLUCOSE SERPL-MCNC: 113 MG/DL (ref 65–140)
HCT VFR BLD AUTO: 31.5 % (ref 34.8–46.1)
HGB BLD-MCNC: 9.5 G/DL (ref 11.5–15.4)
IMM GRANULOCYTES # BLD AUTO: 0.05 THOUSAND/UL (ref 0–0.2)
IMM GRANULOCYTES NFR BLD AUTO: 1 % (ref 0–2)
INR PPP: 0.91 (ref 0.84–1.19)
LYMPHOCYTES # BLD AUTO: 0.38 THOUSANDS/ÂΜL (ref 0.6–4.47)
LYMPHOCYTES NFR BLD AUTO: 6 % (ref 14–44)
MCH RBC QN AUTO: 27.8 PG (ref 26.8–34.3)
MCHC RBC AUTO-ENTMCNC: 30.2 G/DL (ref 31.4–37.4)
MCV RBC AUTO: 92 FL (ref 82–98)
MONOCYTES # BLD AUTO: 0.38 THOUSAND/ÂΜL (ref 0.17–1.22)
MONOCYTES NFR BLD AUTO: 6 % (ref 4–12)
NEUTROPHILS # BLD AUTO: 5.51 THOUSANDS/ÂΜL (ref 1.85–7.62)
NEUTS SEG NFR BLD AUTO: 83 % (ref 43–75)
NRBC BLD AUTO-RTO: 0 /100 WBCS
PLATELET # BLD AUTO: 295 THOUSANDS/UL (ref 149–390)
PMV BLD AUTO: 9.2 FL (ref 8.9–12.7)
POTASSIUM SERPL-SCNC: 3.6 MMOL/L (ref 3.5–5.3)
PROT SERPL-MCNC: 6.9 G/DL (ref 6.4–8.4)
PROTHROMBIN TIME: 12.9 SECONDS (ref 11.6–14.5)
RBC # BLD AUTO: 3.42 MILLION/UL (ref 3.81–5.12)
SODIUM SERPL-SCNC: 137 MMOL/L (ref 135–147)
WBC # BLD AUTO: 6.56 THOUSAND/UL (ref 4.31–10.16)

## 2023-09-25 PROCEDURE — 82164 ANGIOTENSIN I ENZYME TEST: CPT

## 2023-09-25 PROCEDURE — 86140 C-REACTIVE PROTEIN: CPT

## 2023-09-25 PROCEDURE — 85025 COMPLETE CBC W/AUTO DIFF WBC: CPT

## 2023-09-25 PROCEDURE — 80053 COMPREHEN METABOLIC PANEL: CPT

## 2023-09-25 PROCEDURE — 85610 PROTHROMBIN TIME: CPT

## 2023-09-25 PROCEDURE — 85652 RBC SED RATE AUTOMATED: CPT

## 2023-09-25 NOTE — PROGRESS NOTES
Colon and Rectal Surgery   Joelle Mcclure 56 y.o. female MRN: [id number]   Encounter: [id number]  09/26/23   10:48 AM        ASSESSMENT:     Alycia Mas returns today for postoperative visit. She is doing very well ~6weeks from robotic low anterior resection, low pelvic anastomosis, colorectal anastomosis, stapled, Left-sided loop colostomy reversal, Diverting Loop ileostomy on 8/17/2023, we had prior discussed ypT2N0 result, negative margins, excellent outcome. PLAN:  Sigmoidoscopy in 4-6weeks recheck anastomosis  Ostomy reversal will be planned ~4months+ from surgery, after anastomosis check as long as no further medical workups ongoing  CC:  JONAS Villaseñor Dr., Dr., Dr., Dr., EFREM    HPI  Joelle Mcclure is a 56 y.o. female who is here today for a post operative evaluation. The patient notes hematuria, but notes good ostomy output and a good appetite. Reports low energy and fatigue. The patient is status post robotic low anterior resection, low pelvic anastomosis, colorectal anastomosis, stapled, Left-sided loop colostomy reversal, Diverting Loop ileostomy on 8/17/2023. Surgical pathology:  A. Rectosigmoid (low anterior resection):  - Adenocarcinoma arising in a tubular adenoma (2.5cm)  - Seventeen (17) lymph nodes negative for carcinoma (0/17)  - Margins negative for carcinoma   - See staging synoptic (ypT2N0)  ~Comment:  - MMR IHC was performed on the prior outside biopsy showing no loss of MMR IHC nuclear expression: Low risk of MSI-H.   - D2-40 and CD31 highlight vessels.    B. Colon, final distal margin (excision):  - No carcinoma identified    Historical Information   Past Medical History:   Diagnosis Date   • Cancer [facility])    • Colon cancer ([address])    • Fall    • Headache    • Hemochromatosis, unspecified    • History of transfusion     2022 - no adverse reaction   • Hypertension    • Kidney calculi    • Kidney stone    • Liver disease hemangioma   • Muscle weakness    • Personal history of COVID-19 12/2022   • Sarcoidosis    • Seizures ([address])     2022     Past Surgical History:   Procedure Laterality Date   • ABSCESS DRAINAGE      left breast   • BREAST BIOPSY     • CHEST TUBE INSERTION     • COLON SURGERY      colostomy   • COLONOSCOPY     • FL GUIDED CENTRAL VENOUS ACCESS DEVICE INSERTION  03/21/2023   • FL RETROGRADE PYELOGRAM  01/23/2023   • FL RETROGRADE PYELOGRAM  04/03/2023   • FL RETROGRADE PYELOGRAM  7/21/2023   • IR BIOPSY LIVER MASS  05/09/2023   • LUNG BIOPSY     • AK CYSTO BLADDER W/URETERAL CATHETERIZATION Bilateral 07/21/2023    Procedure: CYSTOSCOPY URETEROSCOPY RETROGRADE PYELOGRAM WITH BILATERAL STENT URETERAL EXCHANGE; LEFT LASER LITHOTRIPSY AND STONE BASKET RETRIEVAL;  Surgeon: Ofelia Loving MD;  [facility];  Service: Urology   • AK CYSTO/URETERO W/LITHOTRIPSY &INDWELL STENT INSRT Bilateral 01/23/2023    Procedure: CYSTOSCOPY  RIGHT URETEROSCOPY WITH LITHOTRIPSY HOLMIUM LASER, BILATERAL RETROGRADE PYELOGRAM AND BILATERAL  URETERAL STENT INSERTION;  Surgeon: Ofelia Loving MD;  [facility];  Service: Urology   • AK CYSTO/URETERO W/LITHOTRIPSY &INDWELL STENT INSRT Right 04/03/2023    Procedure: RIGHT URETEROSCOPY WITH LITHOTRIPSY HOLMIUM LASER, RETROGRADE PYELOGRAM; BILATERAL EXCHANGE STENT URETERAL;  Surgeon: Ofelia Loving MD;  [facility];  Service: Urology   • AK INSJ TUNNELED CTR VAD W/SUBQ PORT AGE 5 YR/> N/A 03/21/2023    Procedure: INSERTION VENOUS PORT ( PORT-A-CATH) IR;  Surgeon: Karlos Mann DO;  [facility];  Service: Interventional Radiology   • AK LAPS COLECTOMY PRTL W/COLOPXTSTMY LW ANAST N/A 8/17/2023    Procedure: RESECTION COLON LOW ANTERIOR LAPAROSCOPIC WITH ROBOTICS;  Surgeon: Katy Ramos MD;  [facility];  Service: Colorectal   • TONSILLECTOMY     • URINARY SURGERY Bilateral     bilateral stents       Meds/Allergies Current Outpatient Medications:   •  acetaminophen (TYLENOL) 500 mg tablet, Take 2 tablets (1,000 mg total) by mouth 3 (three) times a day, Disp: 180 tablet, Rfl: 2  •  amitriptyline (ELAVIL) 10 mg tablet, Take 1 tablet (10 mg total) by mouth daily at bedtime, Disp: 60 tablet, Rfl: 0  •  amLODIPine (NORVASC) 10 mg tablet, Take 1 tablet (10 mg total) by mouth daily (Patient taking differently: Take 20 mg by mouth daily), Disp: 90 tablet, Rfl: 3  •  cyanocobalamin (VITAMIN B-12) 1000 MCG tablet, Take 1,000 mcg by mouth daily, Disp: , Rfl:   •  docusate sodium (COLACE) 100 mg capsule, Take 1 capsule (100 mg total) by mouth 2 (two) times a day - hold if you are experiencing diarrhea, Disp: 60 capsule, Rfl: 2  •  enoxaparin (LOVENOX) 40 mg/0.4 mL, Inject 0.4 mL (40 mg total) under the skin every 24 hours for 24 days, Disp: 9.6 mL, Rfl: 0  •  gabapentin (Neurontin) 300 mg capsule, Take 1 capsule (300 mg total) by mouth every 8 (eight) hours To reduce shingles pain, Disp: 90 capsule, Rfl: 2  •  methocarbamol (ROBAXIN) 500 mg tablet, Take 1 tablet (500 mg total) by mouth every 8 (eight) hours for 5 days, Disp: 15 tablet, Rfl: 0  •  ondansetron (ZOFRAN) 4 mg tablet, Take 1 tablet (4 mg total) by mouth every 8 (eight) hours as needed for nausea or vomiting, Disp: 20 tablet, Rfl: 0  •  oxybutynin (DITROPAN) 5 mg tablet, Take 1 tablet (5 mg total) by mouth every 6 (six) hours as needed (bladder spasms), Disp: 30 tablet, Rfl: 0  •  oxyCODONE (Roxicodone) 5 immediate release tablet, Take 1 tablet (5 mg total) by mouth every 4 (four) hours as needed for moderate pain or severe pain Max Daily Amount: 30 mg, Disp: 180 tablet, Rfl: 0  •  potassium chloride (MICRO-K) 10 MEQ CR capsule, Take 4 capsules (40 mEq total) by mouth 2 (two) times a day, Disp: 240 capsule, Rfl: 0  •  predniSONE 10 mg tablet, Take 1 tablet (10 mg total) by mouth daily Please take for 4 weeks and then decrease by 5 mg increments every 2 weeks.  -- Taking 20mg and then 10mg - titrating, Disp: 30 tablet, Rfl: 2  •  ursodiol (ACTIGALL) 300 mg capsule, Take 3 capsules (900 mg total) by mouth in the morning, Disp: 90 capsule, Rfl: 11  No current facility-administered medications for this visit. Facility-Administered Medications Ordered in Other Visits:   •  alteplase (CATHFLO) injection 2 mg, 2 mg, Intracatheter, Q1MIN PRN, Hieu Levy MD      Allergies   Allergen Reactions   • Oxaliplatin Shortness Of Breath     Reactions occurred with 2nd and 3rd infusions (about 1-3 hours from initiation of infusion) and required treatment with steroids and antihistamines. Please refer to allergy note on 2/7/2023 for detailed description of her reactions.    • Morphine Nausea Only     "Every dose made me nauseous" (oral dosing)         Social History   Social History     Substance and Sexual Activity   Alcohol Use Never     Social History     Substance and Sexual Activity   Drug Use Never     Social History     Tobacco Use   Smoking Status Never   • Passive exposure: Past   Smokeless Tobacco Never         Family History:   Family History   Problem Relation Age of Onset   • Cancer Mother    • Cirrhosis Father    • Breast cancer Neg Hx    • Breast cancer additional onset Neg Hx      Review of Systems    Objective   Current Vitals:   Vitals:    09/26/23 1024   BP: 126/72   Weight: 67.1 kg (148 lb)   Height: 5' 2" (1.575 m)     Physical Exam:  General:no distress  Pulm:no increased work of breathing,clear bilateral  CV:sinus  Abdomen:soft,nontender, left sided colostomy closure site completely healed, NEPTALI drain site stitch RLQ removed

## 2023-09-25 NOTE — PROGRESS NOTES
Patient to infusion center for labs. She offers no complaints. Port accessed and labs drawn per order.   She is aware to make next appointment, she declined AVS

## 2023-09-25 NOTE — TELEPHONE ENCOUNTER
----- Message from Xiomara Tomas MD sent at 9/19/2023  3:55 PM EDT -----  Hi Dr. Leanna Villalta,     I sincerely apologize, I am just now seeing this message. We typically prescribe lovenox for 28 days post operatively after GI cancer resections. We instructed Candida Colin to continue lovenox until 9/14, as recorded on her discharge instructions. Thank you,    Triny Martinez    ----- Message -----  From: Brady Keyes MD  Sent: 8/30/2023  10:00 AM EDT  To: Xiomara Tomas MD    HI Dr. Nilton Restrepo,   I saw Candida Colin today in primary care. She has questions about lovenox shot, whether she needs to continue it at home. It's on her discharge med list.   I want to confirm with you if she should continue this at home. Thank you.      Candice Barnett

## 2023-09-26 ENCOUNTER — TELEPHONE (OUTPATIENT)
Age: 58
End: 2023-09-26

## 2023-09-26 ENCOUNTER — OFFICE VISIT (OUTPATIENT)
Age: 58
End: 2023-09-26

## 2023-09-26 VITALS
BODY MASS INDEX: 27.23 KG/M2 | DIASTOLIC BLOOD PRESSURE: 72 MMHG | WEIGHT: 148 LBS | SYSTOLIC BLOOD PRESSURE: 126 MMHG | HEIGHT: 62 IN

## 2023-09-26 DIAGNOSIS — C20 RECTAL CANCER (HCC): Primary | ICD-10-CM

## 2023-09-26 LAB — ACE SERPL-CCNC: 52 U/L (ref 14–82)

## 2023-09-26 PROCEDURE — 99024 POSTOP FOLLOW-UP VISIT: CPT | Performed by: COLON & RECTAL SURGERY

## 2023-09-26 NOTE — PATIENT INSTRUCTIONS
ASSESSMENT:     Simeon Dozier returns today for postoperative visit. She is doing very well ~6weeks from robotic low anterior resection, low pelvic anastomosis, colorectal anastomosis, stapled, Left-sided loop colostomy reversal, Diverting Loop ileostomy on 8/17/2023, we had prior discussed ypT2N0 result, negative margins, excellent outcome.     PLAN:  Sigmoidoscopy in 4-6weeks recheck anastomosis  Ostomy reversal will be planned ~4months+ from surgery, after anastomosis check as long as no further medical workups ongoing

## 2023-09-26 NOTE — TELEPHONE ENCOUNTER
Pt's  states that he is returning call to the office. No notes found. Pt has symptoms of a UTI but test were negative through her PCP. She does have stents and doesn't know if she might need to be sooner since she does have the symptoms.      Pt's  can be reached at [phone]

## 2023-09-26 NOTE — TELEPHONE ENCOUNTER
Agree with above recommendations. Urine culture negative for UTI. Stent colic conservative measures. ER precautions. And follow up as scheduled.  Thanks

## 2023-09-26 NOTE — TELEPHONE ENCOUNTER
Spoke to Yelitza Horn per communication sheet regarding concerns and questions with negative urinalysis but she is displaying UTI symptoms. She currently does not have any nausea, vomiting, fevers, chills, no abdominal pain. Pain is primarily in the back. I explained stent status expectations and that sometimes these symptoms can occur with stent in place. I gave care advice to hydrate well, avoid bladder irritants, rest, have a good bowel regimen, utilize a heating pad. ER precautions reviewed if she develops increased pain, fevers, passes blood through urine, vomiting, chills, shakes, sweats, or inability to void. I will reach out to the provider for further recommendations and to confirm if her scheduled appointment with Dr. Natalya Mishra should be moved to an earlier date. Please advise.

## 2023-09-26 NOTE — TELEPHONE ENCOUNTER
DE OV 9/26 s/p robotic LAR, diverting loop ileostomy 8/17, BMI 27     Schedule 4-6 week Flexible sigmoidoscopy    Scheduled DE FS 11/1 BEC   Handed PW to patient

## 2023-09-26 NOTE — LETTER
[address] [id number]        FLEXIBLE SIGMOIDOSCOPY WITH SEDATION INSTRUCTIONS  PLEASE NOTE. .. AS OF JUNE 1, 2014, OUR OFFICE REQUIRES 48 HOURS NOTICE OF CANCELLATION/RESCHEDULE OF A PROCEDURE TO AVOID INCURRING A MISSED APPOINTMENT FEE. Your Sigmoidoscopy Procedure has been scheduled at:  [address] [facility]. #[address] [place], [place] [phone]    The Date of your Procedure is: November 1, 2023   Patient is to have nothing to eat or drink after midnight. Also in preparation for this procedure, take 2 Fleet Enemas 2 hours prior to the appointment. These enemas can be purchased over the counter in most pharmacies. Follow the directions on the box. Report to the [facility] 30 minutes prior to the procedure. Special instructions may be needed if you are taking aspirin or any aspirin-containing medication. Check with your family physician. Check with your family doctor if you are taking Coumadin (a blood thinner), Plavix, Gingko biloba, Ginseng, Feverfew, and/or Faith's Wort. We suggest stopping these for 3 days. If you are on DIABETIC MEDICATION (tablets or insulin) your doctor may make changes in your preparation. Take all medications usual unless otherwise instructed. As always, if you have any questions or concerns, feel free to call the office. Our  staff will be happy to connect you with one of the nurses to answer any questions or address any concerns you have regarding your procedure. Arrangements must be made for a responsible party to drive you home from the procedure. If you do not have a responsible family member or friend to drive you home, the procedure will not be done. Vast or a taxi is not acceptable.   It is important you notify our office of any insurance changes prior to your procedure and, if necessary, supply us with referrals from your primary care physician.

## 2023-09-28 DIAGNOSIS — G89.3 CANCER RELATED PAIN: ICD-10-CM

## 2023-09-28 DIAGNOSIS — C20 RECTAL CANCER (HCC): ICD-10-CM

## 2023-09-28 DIAGNOSIS — C18.7 MALIGNANT NEOPLASM OF SIGMOID COLON (HCC): ICD-10-CM

## 2023-09-28 DIAGNOSIS — Z51.5 PALLIATIVE CARE PATIENT: ICD-10-CM

## 2023-09-28 PROBLEM — N13.5 URETERAL STRICTURE: Status: ACTIVE | Noted: 2023-09-28

## 2023-09-28 RX ORDER — ACETAMINOPHEN 500 MG
1000 TABLET ORAL 3 TIMES DAILY
Qty: 180 TABLET | Refills: 0 | Status: SHIPPED | OUTPATIENT
Start: 2023-09-28

## 2023-09-28 NOTE — PROGRESS NOTES
Referring Physician: Nazario Carrera MD  A copy of this note was sent to the referring physician. Diagnoses and all orders for this visit:    Malignant neoplasm of sigmoid colon ([address])  -     POCT urine dip    Kidney calculi  -     POCT urine dip  -     Case request operating room: CYSTOSCOPY URETEROSCOPY WITH LITHOTRIPSY HOLMIUM LASER, RETROGRADE PYELOGRAM AND INSERTION STENT URETERAL; Standing  -     Case request operating room: CYSTOSCOPY URETEROSCOPY WITH LITHOTRIPSY HOLMIUM LASER, RETROGRADE PYELOGRAM AND INSERTION STENT URETERAL    Ureteral stricture  -     POCT urine dip  -     Case request operating room: CYSTOSCOPY URETEROSCOPY WITH LITHOTRIPSY HOLMIUM LASER, RETROGRADE PYELOGRAM AND INSERTION STENT URETERAL; Standing  -     Case request operating room: CYSTOSCOPY URETEROSCOPY WITH LITHOTRIPSY HOLMIUM LASER, RETROGRADE PYELOGRAM AND INSERTION STENT URETERAL    Other orders  -     Place sequential compression device; Standing  -     ciprofloxacin (CIPRO) IVPB (premix in 5% dextrose) 400 mg 200 mL            Assessment and plan:       1.  complex nephrolithiasis  -Current stone burden based on recent CT in August:   -Obstructing left lower pole calculus, I believe the stent can be removed   -Greater than 1 cm stone burden within the right proximal ureter consistent with her known ureteral stricture disease    2. Right ureteral stricture    3. Bilateral indwelling ureteral stents    #4 advanced colorectal cancer now status post low anterior resection    Patient's ureteral stents have been maintained until she completed her neoadjuvant chemotherapy and her surgical resection with thankfully appears to have gone rather well. At this point we can we prioritize her complex stone disease    Surgical plan will include removal of her left ureteral stent and repeat ureteroscopy to address her ureteral stricture disease.   I do have concerns that this stricture may not be amenable to endoscopic management and may require either serial stent exchanges or potential for reconstruction or nephrectomy. We will plan for further assessment and more definitive management planning at the time of her upcoming ureteroscopy. The patient's stricture disease cannot be palliated with her upcoming endoscopic procedure I will plan for referral to a reconstructive urologist consideration of a ureteral ureterectomy likely with buccal mucosal grafting    Consent obtained surgery will be scheduled in the near future. Renae Richard MD      Chief Complaint     Follow-up complex stone disease and right ureteral stricture      History of Present Illness     Chidi Carr is a 56 y.o. Detailed Urologic History     - please refer to HPI    Review of Systems     Review of Systems   Constitutional: Negative for activity change and fatigue. HENT: Negative for congestion. Eyes: Negative for visual disturbance. Respiratory: Negative for shortness of breath and wheezing. Cardiovascular: Negative for chest pain and leg swelling. Gastrointestinal: Negative for abdominal pain. Endocrine: Negative for polyuria. Genitourinary: Negative for dysuria, flank pain, hematuria and urgency. Musculoskeletal: Negative for back pain. Allergic/Immunologic: Negative for immunocompromised state. Neurological: Negative for dizziness and numbness. Psychiatric/Behavioral: Negative for dysphoric mood. All other systems reviewed and are negative. Allergies     Allergies   Allergen Reactions   • Oxaliplatin Shortness Of Breath     Reactions occurred with 2nd and 3rd infusions (about 1-3 hours from initiation of infusion) and required treatment with steroids and antihistamines. Please refer to allergy note on 2/7/2023 for detailed description of her reactions.    • Morphine Nausea Only     "Every dose made me nauseous" (oral dosing)       Physical Exam       Physical Exam  Constitutional:       General: He is not in acute distress. Appearance: He is well-developed. HENT:      Head: Normocephalic and atraumatic. Cardiovascular:      Comments: Negative lower extremity edema  Pulmonary:      Effort: Pulmonary effort is normal.      Breath sounds: Normal breath sounds. Abdominal:      Palpations: Abdomen is soft. Musculoskeletal:         General: Normal range of motion. Cervical back: Normal range of motion. Skin:     General: Skin is warm. Neurological:      Mental Status: He is alert and oriented to person, place, and time. Psychiatric:         Behavior: Behavior normal.           Vital Signs  There were no vitals filed for this visit.       Current Medications       Current Outpatient Medications:   •  acetaminophen (TYLENOL) 500 mg tablet, Take 2 tablets (1,000 mg total) by mouth 3 (three) times a day, Disp: 180 tablet, Rfl: 2  •  amitriptyline (ELAVIL) 10 mg tablet, Take 1 tablet (10 mg total) by mouth daily at bedtime, Disp: 60 tablet, Rfl: 0  •  amLODIPine (NORVASC) 10 mg tablet, Take 1 tablet (10 mg total) by mouth daily (Patient taking differently: Take 20 mg by mouth daily), Disp: 90 tablet, Rfl: 3  •  cyanocobalamin (VITAMIN B-12) 1000 MCG tablet, Take 1,000 mcg by mouth daily, Disp: , Rfl:   •  docusate sodium (COLACE) 100 mg capsule, Take 1 capsule (100 mg total) by mouth 2 (two) times a day - hold if you are experiencing diarrhea, Disp: 60 capsule, Rfl: 2  •  enoxaparin (LOVENOX) 40 mg/0.4 mL, Inject 0.4 mL (40 mg total) under the skin every 24 hours for 24 days, Disp: 9.6 mL, Rfl: 0  •  gabapentin (Neurontin) 300 mg capsule, Take 1 capsule (300 mg total) by mouth every 8 (eight) hours To reduce shingles pain, Disp: 90 capsule, Rfl: 2  •  methocarbamol (ROBAXIN) 500 mg tablet, Take 1 tablet (500 mg total) by mouth every 8 (eight) hours for 5 days, Disp: 15 tablet, Rfl: 0  •  ondansetron (ZOFRAN) 4 mg tablet, Take 1 tablet (4 mg total) by mouth every 8 (eight) hours as needed for nausea or vomiting, Disp: 20 tablet, Rfl: 0  •  oxybutynin (DITROPAN) 5 mg tablet, Take 1 tablet (5 mg total) by mouth every 6 (six) hours as needed (bladder spasms), Disp: 30 tablet, Rfl: 0  •  oxyCODONE (Roxicodone) 5 immediate release tablet, Take 1 tablet (5 mg total) by mouth every 4 (four) hours as needed for moderate pain or severe pain Max Daily Amount: 30 mg, Disp: 180 tablet, Rfl: 0  •  potassium chloride (MICRO-K) 10 MEQ CR capsule, Take 4 capsules (40 mEq total) by mouth 2 (two) times a day, Disp: 240 capsule, Rfl: 0  •  predniSONE 10 mg tablet, Take 1 tablet (10 mg total) by mouth daily Please take for 4 weeks and then decrease by 5 mg increments every 2 weeks. -- Taking 20mg and then 10mg - titrating, Disp: 30 tablet, Rfl: 2  •  ursodiol (ACTIGALL) 300 mg capsule, Take 3 capsules (900 mg total) by mouth in the morning, Disp: 90 capsule, Rfl: 11  No current facility-administered medications for this visit.       Active Problems     Patient Active Problem List   Diagnosis   • Malignant neoplasm of sigmoid colon ([address])   • History of Clostridium difficile colitis   • Other hemochromatosis   • Hypertension   • Sarcoidosis   • Impaired fasting glucose   • Hypokalemia   • Transaminitis   • R flank pain with bilateral hydronephrosis   • Adrenal nodule (HCC)   • Bleeding from colostomy stoma [facility])   • Other specified anemias   • Thrombocytopenia (HCC)   • Nonrheumatic mitral valve regurgitation   • Advance directive discussed with patient   • Gross hematuria   • Skin lesion   • Abnormal CT of the chest   • Kidney calculi   • Dysuria   • Chest pain   • Abnormal TSH   • Port-A-Cath in place   • Drug-induced constipation   • Palliative care patient   • Cancer related pain   • Rectal cancer (HCC)   • Preop examination   • Shingles   • Partial small bowel obstruction (HCC)   • Scalp pain   • Left ear pain   • Chronic anticoagulation   • Neuralgia involving scalp   • Anxiety about health   • 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   • Acute UTI   • Ureteral stricture         Past Medical History     Past Medical History:   Diagnosis Date   • Cancer [facility])    • Colon cancer ([address])    • Fall    • Headache    • Hemochromatosis, unspecified    • History of transfusion     2022 - no adverse reaction   • Hypertension    • Kidney calculi    • Kidney stone    • Liver disease     hemangioma   • Muscle weakness    • Personal history of COVID-19 12/2022   • Sarcoidosis    • Seizures ([address])     2022         Surgical History     Past Surgical History:   Procedure Laterality Date   • ABSCESS DRAINAGE      left breast   • BREAST BIOPSY     • CHEST TUBE INSERTION     • COLON SURGERY      colostomy   • COLONOSCOPY     • FL GUIDED CENTRAL VENOUS ACCESS DEVICE INSERTION  03/21/2023   • FL RETROGRADE PYELOGRAM  01/23/2023   • FL RETROGRADE PYELOGRAM  04/03/2023   • FL RETROGRADE PYELOGRAM  7/21/2023   • IR BIOPSY LIVER MASS  05/09/2023   • LUNG BIOPSY     • AL CYSTO BLADDER W/URETERAL CATHETERIZATION Bilateral 07/21/2023    Procedure: CYSTOSCOPY URETEROSCOPY RETROGRADE PYELOGRAM WITH BILATERAL STENT URETERAL EXCHANGE; LEFT LASER LITHOTRIPSY AND STONE BASKET RETRIEVAL;  Surgeon: Albina Marte MD;  [facility];  Service: Urology   • AL CYSTO/URETERO W/LITHOTRIPSY &INDWELL STENT INSRT Bilateral 01/23/2023    Procedure: CYSTOSCOPY  RIGHT URETEROSCOPY WITH LITHOTRIPSY HOLMIUM LASER, BILATERAL RETROGRADE PYELOGRAM AND BILATERAL  URETERAL STENT INSERTION;  Surgeon: Albina Marte MD;  [facility];  Service: Urology   • AL CYSTO/URETERO W/LITHOTRIPSY &INDWELL STENT INSRT Right 04/03/2023    Procedure: RIGHT URETEROSCOPY WITH LITHOTRIPSY HOLMIUM LASER, RETROGRADE PYELOGRAM; BILATERAL EXCHANGE STENT URETERAL;  Surgeon: Albina Marte MD;  [facility];  Service: Urology   • AL INSJ TUNNELED CTR VAD W/SUBQ PORT AGE 5 YR/> N/A 03/21/2023    Procedure: INSERTION VENOUS PORT ( PORT-A-CATH) IR;  Surgeon: Maylin Gaines DO;  [facility];  Service: Interventional Radiology   • TX LAPS COLECTOMY PRTL W/COLOPXTSTMY LW ANAST N/A 8/17/2023    Procedure: RESECTION COLON LOW ANTERIOR LAPAROSCOPIC WITH ROBOTICS;  Surgeon: Mary Conway MD;  [facility];  Service: Colorectal   • TONSILLECTOMY     • URINARY SURGERY Bilateral     bilateral stents         Family History     Family History   Problem Relation Age of Onset   • Cancer Mother    • Cirrhosis Father    • Breast cancer Neg Hx    • Breast cancer additional onset Neg Hx          Social History     Social History     Social History     Tobacco Use   Smoking Status Never   • Passive exposure: Past   Smokeless Tobacco Never         Pertinent Lab Values     Lab Results   Component Value Date    CREATININE 0.82 09/25/2023       No results found for: "PSA"    @RESULTRCNT(1H])@      Pertinent Imaging       No results found. Portions of the record may have been created with voice recognition software. Occasional wrong word or "sound a like" substitutions may have occurred due to the inherent limitations of voice recognition software. In addition some of the content generated from this outpatient encounter includes information designed for patient education and/or communication back to the referring provider. Read the chart carefully and recognize, using context, where substitutions have occurred.

## 2023-09-28 NOTE — H&P (VIEW-ONLY)
Referring Physician: Gogo rGeen MD  A copy of this note was sent to the referring physician. Diagnoses and all orders for this visit:    Malignant neoplasm of sigmoid colon ([address])  -     POCT urine dip    Kidney calculi  -     POCT urine dip  -     Case request operating room: CYSTOSCOPY URETEROSCOPY WITH LITHOTRIPSY HOLMIUM LASER, RETROGRADE PYELOGRAM AND INSERTION STENT URETERAL; Standing  -     Case request operating room: CYSTOSCOPY URETEROSCOPY WITH LITHOTRIPSY HOLMIUM LASER, RETROGRADE PYELOGRAM AND INSERTION STENT URETERAL    Ureteral stricture  -     POCT urine dip  -     Case request operating room: CYSTOSCOPY URETEROSCOPY WITH LITHOTRIPSY HOLMIUM LASER, RETROGRADE PYELOGRAM AND INSERTION STENT URETERAL; Standing  -     Case request operating room: CYSTOSCOPY URETEROSCOPY WITH LITHOTRIPSY HOLMIUM LASER, RETROGRADE PYELOGRAM AND INSERTION STENT URETERAL    Other orders  -     Place sequential compression device; Standing  -     ciprofloxacin (CIPRO) IVPB (premix in 5% dextrose) 400 mg 200 mL            Assessment and plan:       1.  complex nephrolithiasis  -Current stone burden based on recent CT in August:   -Obstructing left lower pole calculus, I believe the stent can be removed   -Greater than 1 cm stone burden within the right proximal ureter consistent with her known ureteral stricture disease    2. Right ureteral stricture    3. Bilateral indwelling ureteral stents    #4 advanced colorectal cancer now status post low anterior resection    Patient's ureteral stents have been maintained until she completed her neoadjuvant chemotherapy and her surgical resection with thankfully appears to have gone rather well. At this point we can we prioritize her complex stone disease    Surgical plan will include removal of her left ureteral stent and repeat ureteroscopy to address her ureteral stricture disease.   I do have concerns that this stricture may not be amenable to endoscopic management and may require either serial stent exchanges or potential for reconstruction or nephrectomy. We will plan for further assessment and more definitive management planning at the time of her upcoming ureteroscopy. The patient's stricture disease cannot be palliated with her upcoming endoscopic procedure I will plan for referral to a reconstructive urologist consideration of a ureteral ureterectomy likely with buccal mucosal grafting    Consent obtained surgery will be scheduled in the near future. Didier Lee MD      Chief Complaint     Follow-up complex stone disease and right ureteral stricture      History of Present Illness     Travis Mejia is a 56 y.o. Detailed Urologic History     - please refer to HPI    Review of Systems     Review of Systems   Constitutional: Negative for activity change and fatigue. HENT: Negative for congestion. Eyes: Negative for visual disturbance. Respiratory: Negative for shortness of breath and wheezing. Cardiovascular: Negative for chest pain and leg swelling. Gastrointestinal: Negative for abdominal pain. Endocrine: Negative for polyuria. Genitourinary: Negative for dysuria, flank pain, hematuria and urgency. Musculoskeletal: Negative for back pain. Allergic/Immunologic: Negative for immunocompromised state. Neurological: Negative for dizziness and numbness. Psychiatric/Behavioral: Negative for dysphoric mood. All other systems reviewed and are negative. Allergies     Allergies   Allergen Reactions   • Oxaliplatin Shortness Of Breath     Reactions occurred with 2nd and 3rd infusions (about 1-3 hours from initiation of infusion) and required treatment with steroids and antihistamines. Please refer to allergy note on 2/7/2023 for detailed description of her reactions.    • Morphine Nausea Only     "Every dose made me nauseous" (oral dosing)       Physical Exam       Physical Exam  Constitutional:       General: He is not in acute distress. Appearance: He is well-developed. HENT:      Head: Normocephalic and atraumatic. Cardiovascular:      Comments: Negative lower extremity edema  Pulmonary:      Effort: Pulmonary effort is normal.      Breath sounds: Normal breath sounds. Abdominal:      Palpations: Abdomen is soft. Musculoskeletal:         General: Normal range of motion. Cervical back: Normal range of motion. Skin:     General: Skin is warm. Neurological:      Mental Status: He is alert and oriented to person, place, and time. Psychiatric:         Behavior: Behavior normal.           Vital Signs  There were no vitals filed for this visit.       Current Medications       Current Outpatient Medications:   •  acetaminophen (TYLENOL) 500 mg tablet, Take 2 tablets (1,000 mg total) by mouth 3 (three) times a day, Disp: 180 tablet, Rfl: 2  •  amitriptyline (ELAVIL) 10 mg tablet, Take 1 tablet (10 mg total) by mouth daily at bedtime, Disp: 60 tablet, Rfl: 0  •  amLODIPine (NORVASC) 10 mg tablet, Take 1 tablet (10 mg total) by mouth daily (Patient taking differently: Take 20 mg by mouth daily), Disp: 90 tablet, Rfl: 3  •  cyanocobalamin (VITAMIN B-12) 1000 MCG tablet, Take 1,000 mcg by mouth daily, Disp: , Rfl:   •  docusate sodium (COLACE) 100 mg capsule, Take 1 capsule (100 mg total) by mouth 2 (two) times a day - hold if you are experiencing diarrhea, Disp: 60 capsule, Rfl: 2  •  enoxaparin (LOVENOX) 40 mg/0.4 mL, Inject 0.4 mL (40 mg total) under the skin every 24 hours for 24 days, Disp: 9.6 mL, Rfl: 0  •  gabapentin (Neurontin) 300 mg capsule, Take 1 capsule (300 mg total) by mouth every 8 (eight) hours To reduce shingles pain, Disp: 90 capsule, Rfl: 2  •  methocarbamol (ROBAXIN) 500 mg tablet, Take 1 tablet (500 mg total) by mouth every 8 (eight) hours for 5 days, Disp: 15 tablet, Rfl: 0  •  ondansetron (ZOFRAN) 4 mg tablet, Take 1 tablet (4 mg total) by mouth every 8 (eight) hours as needed for nausea or vomiting, Disp: 20 tablet, Rfl: 0  •  oxybutynin (DITROPAN) 5 mg tablet, Take 1 tablet (5 mg total) by mouth every 6 (six) hours as needed (bladder spasms), Disp: 30 tablet, Rfl: 0  •  oxyCODONE (Roxicodone) 5 immediate release tablet, Take 1 tablet (5 mg total) by mouth every 4 (four) hours as needed for moderate pain or severe pain Max Daily Amount: 30 mg, Disp: 180 tablet, Rfl: 0  •  potassium chloride (MICRO-K) 10 MEQ CR capsule, Take 4 capsules (40 mEq total) by mouth 2 (two) times a day, Disp: 240 capsule, Rfl: 0  •  predniSONE 10 mg tablet, Take 1 tablet (10 mg total) by mouth daily Please take for 4 weeks and then decrease by 5 mg increments every 2 weeks. -- Taking 20mg and then 10mg - titrating, Disp: 30 tablet, Rfl: 2  •  ursodiol (ACTIGALL) 300 mg capsule, Take 3 capsules (900 mg total) by mouth in the morning, Disp: 90 capsule, Rfl: 11  No current facility-administered medications for this visit.       Active Problems     Patient Active Problem List   Diagnosis   • Malignant neoplasm of sigmoid colon ([address])   • History of Clostridium difficile colitis   • Other hemochromatosis   • Hypertension   • Sarcoidosis   • Impaired fasting glucose   • Hypokalemia   • Transaminitis   • R flank pain with bilateral hydronephrosis   • Adrenal nodule (HCC)   • Bleeding from colostomy stoma [facility])   • Other specified anemias   • Thrombocytopenia (HCC)   • Nonrheumatic mitral valve regurgitation   • Advance directive discussed with patient   • Gross hematuria   • Skin lesion   • Abnormal CT of the chest   • Kidney calculi   • Dysuria   • Chest pain   • Abnormal TSH   • Port-A-Cath in place   • Drug-induced constipation   • Palliative care patient   • Cancer related pain   • Rectal cancer (HCC)   • Preop examination   • Shingles   • Partial small bowel obstruction (HCC)   • Scalp pain   • Left ear pain   • Chronic anticoagulation   • Neuralgia involving scalp   • Anxiety about health   • 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   • Acute UTI   • Ureteral stricture         Past Medical History     Past Medical History:   Diagnosis Date   • Cancer [facility])    • Colon cancer ([address])    • Fall    • Headache    • Hemochromatosis, unspecified    • History of transfusion     2022 - no adverse reaction   • Hypertension    • Kidney calculi    • Kidney stone    • Liver disease     hemangioma   • Muscle weakness    • Personal history of COVID-19 12/2022   • Sarcoidosis    • Seizures ([address])     2022         Surgical History     Past Surgical History:   Procedure Laterality Date   • ABSCESS DRAINAGE      left breast   • BREAST BIOPSY     • CHEST TUBE INSERTION     • COLON SURGERY      colostomy   • COLONOSCOPY     • FL GUIDED CENTRAL VENOUS ACCESS DEVICE INSERTION  03/21/2023   • FL RETROGRADE PYELOGRAM  01/23/2023   • FL RETROGRADE PYELOGRAM  04/03/2023   • FL RETROGRADE PYELOGRAM  7/21/2023   • IR BIOPSY LIVER MASS  05/09/2023   • LUNG BIOPSY     • W[place] CYSTO BLADDER W/URETERAL CATHETERIZATION Bilateral 07/21/2023    Procedure: CYSTOSCOPY URETEROSCOPY RETROGRADE PYELOGRAM WITH BILATERAL STENT URETERAL EXCHANGE; LEFT LASER LITHOTRIPSY AND STONE BASKET RETRIEVAL;  Surgeon: Blaze Sauer MD;  [facility];  Service: Urology   • WV CYSTO/URETERO W/LITHOTRIPSY &INDWELL STENT INSRT Bilateral 01/23/2023    Procedure: CYSTOSCOPY  RIGHT URETEROSCOPY WITH LITHOTRIPSY HOLMIUM LASER, BILATERAL RETROGRADE PYELOGRAM AND BILATERAL  URETERAL STENT INSERTION;  Surgeon: Blaze Sauer MD;  [facility];  Service: Urology   • W[place] CYSTO/URETERO W/LITHOTRIPSY &INDWELL STENT INSRT Right 04/03/2023    Procedure: RIGHT URETEROSCOPY WITH LITHOTRIPSY HOLMIUM LASER, RETROGRADE PYELOGRAM; BILATERAL EXCHANGE STENT URETERAL;  Surgeon: Blaze Sauer MD;  [facility];  Service: Urology   • WV INSJ TUNNELED CTR VAD W/SUBQ PORT AGE 5 YR/> N/A 03/21/2023    Procedure: INSERTION VENOUS PORT ( PORT-A-CATH) IR;  Surgeon: Angeli Carter DO;  [facility];  Service: Interventional Radiology   • NE LAPS COLECTOMY PRTL W/COLOPXTSTMY LW ANAST N/A 8/17/2023    Procedure: RESECTION COLON LOW ANTERIOR LAPAROSCOPIC WITH ROBOTICS;  Surgeon: Marquita Bosworth, MD;  [facility];  Service: Colorectal   • TONSILLECTOMY     • URINARY SURGERY Bilateral     bilateral stents         Family History     Family History   Problem Relation Age of Onset   • Cancer Mother    • Cirrhosis Father    • Breast cancer Neg Hx    • Breast cancer additional onset Neg Hx          Social History     Social History     Social History     Tobacco Use   Smoking Status Never   • Passive exposure: Past   Smokeless Tobacco Never         Pertinent Lab Values     Lab Results   Component Value Date    CREATININE 0.82 09/25/2023       No results found for: "PSA"    @RESULTRCNT(1H])@      Pertinent Imaging       No results found. Portions of the record may have been created with voice recognition software. Occasional wrong word or "sound a like" substitutions may have occurred due to the inherent limitations of voice recognition software. In addition some of the content generated from this outpatient encounter includes information designed for patient education and/or communication back to the referring provider. Read the chart carefully and recognize, using context, where substitutions have occurred.

## 2023-09-29 ENCOUNTER — TELEPHONE (OUTPATIENT)
Dept: UROLOGY | Facility: AMBULATORY SURGERY CENTER | Age: 58
End: 2023-09-29

## 2023-09-29 ENCOUNTER — OFFICE VISIT (OUTPATIENT)
Dept: UROLOGY | Facility: CLINIC | Age: 58
End: 2023-09-29
Payer: COMMERCIAL

## 2023-09-29 VITALS
HEART RATE: 99 BPM | OXYGEN SATURATION: 100 % | HEIGHT: 62 IN | DIASTOLIC BLOOD PRESSURE: 72 MMHG | SYSTOLIC BLOOD PRESSURE: 122 MMHG | WEIGHT: 149 LBS | BODY MASS INDEX: 27.42 KG/M2

## 2023-09-29 DIAGNOSIS — C18.7 MALIGNANT NEOPLASM OF SIGMOID COLON (HCC): Primary | ICD-10-CM

## 2023-09-29 DIAGNOSIS — N20.0 KIDNEY CALCULI: ICD-10-CM

## 2023-09-29 DIAGNOSIS — N13.5 URETERAL STRICTURE: ICD-10-CM

## 2023-09-29 LAB
SL AMB  POCT GLUCOSE, UA: NORMAL
SL AMB LEUKOCYTE ESTERASE,UA: NORMAL
SL AMB POCT BILIRUBIN,UA: NORMAL
SL AMB POCT BLOOD,UA: NORMAL
SL AMB POCT CLARITY,UA: NORMAL
SL AMB POCT COLOR,UA: NORMAL
SL AMB POCT KETONES,UA: NORMAL
SL AMB POCT NITRITE,UA: NORMAL
SL AMB POCT PH,UA: 6
SL AMB POCT SPECIFIC GRAVITY,UA: 1.02
SL AMB POCT URINE PROTEIN: 2000
SL AMB POCT UROBILINOGEN: 0.2

## 2023-09-29 PROCEDURE — 87086 URINE CULTURE/COLONY COUNT: CPT | Performed by: UROLOGY

## 2023-09-29 PROCEDURE — 99214 OFFICE O/P EST MOD 30 MIN: CPT | Performed by: UROLOGY

## 2023-09-29 PROCEDURE — 81002 URINALYSIS NONAUTO W/O SCOPE: CPT | Performed by: UROLOGY

## 2023-09-29 RX ORDER — CIPROFLOXACIN 2 MG/ML
400 INJECTION, SOLUTION INTRAVENOUS ONCE
OUTPATIENT
Start: 2023-09-29 | End: 2023-09-29

## 2023-09-29 NOTE — LETTER
September 29, 2023     Carola Kincaid MD  [facility] [address]    Patient: Jacinto Guajardo   YOB: 1965   Date of Visit: 9/29/2023       Dear Dr. Makenzie Marcial: Thank you for referring Jacinto Guajardo to me for evaluation. Below are my notes for this consultation. If you have questions, please do not hesitate to call me. I look forward to following your patient along with you. Sincerely,        Benjamin Fairbanks MD        CC: No Recipients    Benjamin Fairbanks MD  9/29/2023  9:02 AM  Sign when Signing Visit  Referring Physician: Henrey Gilford, MD  A copy of this note was sent to the referring physician. Diagnoses and all orders for this visit:    Malignant neoplasm of sigmoid colon ([address])  -     POCT urine dip    Kidney calculi  -     POCT urine dip  -     Case request operating room: CYSTOSCOPY URETEROSCOPY WITH LITHOTRIPSY HOLMIUM LASER, RETROGRADE PYELOGRAM AND INSERTION STENT URETERAL; Standing  -     Case request operating room: CYSTOSCOPY URETEROSCOPY WITH LITHOTRIPSY HOLMIUM LASER, RETROGRADE PYELOGRAM AND INSERTION STENT URETERAL    Ureteral stricture  -     POCT urine dip  -     Case request operating room: CYSTOSCOPY URETEROSCOPY WITH LITHOTRIPSY HOLMIUM LASER, RETROGRADE PYELOGRAM AND INSERTION STENT URETERAL; Standing  -     Case request operating room: CYSTOSCOPY URETEROSCOPY WITH LITHOTRIPSY HOLMIUM LASER, RETROGRADE PYELOGRAM AND INSERTION STENT URETERAL    Other orders  -     Place sequential compression device; Standing  -     ciprofloxacin (CIPRO) IVPB (premix in 5% dextrose) 400 mg 200 mL            Assessment and plan:       1.  complex nephrolithiasis  -Current stone burden based on recent CT in August:   -Obstructing left lower pole calculus, I believe the stent can be removed   -Greater than 1 cm stone burden within the right proximal ureter consistent with her known ureteral stricture disease    2. Right ureteral stricture    3.   Bilateral indwelling ureteral stents    #4 advanced colorectal cancer now status post low anterior resection    Patient's ureteral stents have been maintained until she completed her neoadjuvant chemotherapy and her surgical resection with thankfully appears to have gone rather well. At this point we can we prioritize her complex stone disease    Surgical plan will include removal of her left ureteral stent and repeat ureteroscopy to address her ureteral stricture disease. I do have concerns that this stricture may not be amenable to endoscopic management and may require either serial stent exchanges or potential for reconstruction or nephrectomy. We will plan for further assessment and more definitive management planning at the time of her upcoming ureteroscopy. The patient's stricture disease cannot be palliated with her upcoming endoscopic procedure I will plan for referral to a reconstructive urologist consideration of a ureteral ureterectomy likely with buccal mucosal grafting    Consent obtained surgery will be scheduled in the near future. Jorge Wilder MD      Chief Complaint     Follow-up complex stone disease and right ureteral stricture      History of Present Illness     Trinity Kessler is a 56 y.o. Detailed Urologic History     - please refer to HPI    Review of Systems     Review of Systems   Constitutional: Negative for activity change and fatigue. HENT: Negative for congestion. Eyes: Negative for visual disturbance. Respiratory: Negative for shortness of breath and wheezing. Cardiovascular: Negative for chest pain and leg swelling. Gastrointestinal: Negative for abdominal pain. Endocrine: Negative for polyuria. Genitourinary: Negative for dysuria, flank pain, hematuria and urgency. Musculoskeletal: Negative for back pain. Allergic/Immunologic: Negative for immunocompromised state. Neurological: Negative for dizziness and numbness.    Psychiatric/Behavioral: Negative for dysphoric mood. All other systems reviewed and are negative. Allergies     Allergies   Allergen Reactions   • Oxaliplatin Shortness Of Breath     Reactions occurred with 2nd and 3rd infusions (about 1-3 hours from initiation of infusion) and required treatment with steroids and antihistamines. Please refer to allergy note on 2/7/2023 for detailed description of her reactions. • Morphine Nausea Only     "Every dose made me nauseous" (oral dosing)       Physical Exam       Physical Exam  Constitutional:       General: He is not in acute distress. Appearance: He is well-developed. HENT:      Head: Normocephalic and atraumatic. Cardiovascular:      Comments: Negative lower extremity edema  Pulmonary:      Effort: Pulmonary effort is normal.      Breath sounds: Normal breath sounds. Abdominal:      Palpations: Abdomen is soft. Musculoskeletal:         General: Normal range of motion. Cervical back: Normal range of motion. Skin:     General: Skin is warm. Neurological:      Mental Status: He is alert and oriented to person, place, and time. Psychiatric:         Behavior: Behavior normal.           Vital Signs  There were no vitals filed for this visit.       Current Medications       Current Outpatient Medications:   •  acetaminophen (TYLENOL) 500 mg tablet, Take 2 tablets (1,000 mg total) by mouth 3 (three) times a day, Disp: 180 tablet, Rfl: 2  •  amitriptyline (ELAVIL) 10 mg tablet, Take 1 tablet (10 mg total) by mouth daily at bedtime, Disp: 60 tablet, Rfl: 0  •  amLODIPine (NORVASC) 10 mg tablet, Take 1 tablet (10 mg total) by mouth daily (Patient taking differently: Take 20 mg by mouth daily), Disp: 90 tablet, Rfl: 3  •  cyanocobalamin (VITAMIN B-12) 1000 MCG tablet, Take 1,000 mcg by mouth daily, Disp: , Rfl:   •  docusate sodium (COLACE) 100 mg capsule, Take 1 capsule (100 mg total) by mouth 2 (two) times a day - hold if you are experiencing diarrhea, Disp: 60 capsule, Rfl: 2  •  enoxaparin (LOVENOX) 40 mg/0.4 mL, Inject 0.4 mL (40 mg total) under the skin every 24 hours for 24 days, Disp: 9.6 mL, Rfl: 0  •  gabapentin (Neurontin) 300 mg capsule, Take 1 capsule (300 mg total) by mouth every 8 (eight) hours To reduce shingles pain, Disp: 90 capsule, Rfl: 2  •  methocarbamol (ROBAXIN) 500 mg tablet, Take 1 tablet (500 mg total) by mouth every 8 (eight) hours for 5 days, Disp: 15 tablet, Rfl: 0  •  ondansetron (ZOFRAN) 4 mg tablet, Take 1 tablet (4 mg total) by mouth every 8 (eight) hours as needed for nausea or vomiting, Disp: 20 tablet, Rfl: 0  •  oxybutynin (DITROPAN) 5 mg tablet, Take 1 tablet (5 mg total) by mouth every 6 (six) hours as needed (bladder spasms), Disp: 30 tablet, Rfl: 0  •  oxyCODONE (Roxicodone) 5 immediate release tablet, Take 1 tablet (5 mg total) by mouth every 4 (four) hours as needed for moderate pain or severe pain Max Daily Amount: 30 mg, Disp: 180 tablet, Rfl: 0  •  potassium chloride (MICRO-K) 10 MEQ CR capsule, Take 4 capsules (40 mEq total) by mouth 2 (two) times a day, Disp: 240 capsule, Rfl: 0  •  predniSONE 10 mg tablet, Take 1 tablet (10 mg total) by mouth daily Please take for 4 weeks and then decrease by 5 mg increments every 2 weeks. -- Taking 20mg and then 10mg - titrating, Disp: 30 tablet, Rfl: 2  •  ursodiol (ACTIGALL) 300 mg capsule, Take 3 capsules (900 mg total) by mouth in the morning, Disp: 90 capsule, Rfl: 11  No current facility-administered medications for this visit.       Active Problems     Patient Active Problem List   Diagnosis   • Malignant neoplasm of sigmoid colon ([address])   • History of Clostridium difficile colitis   • Other hemochromatosis   • Hypertension   • Sarcoidosis   • Impaired fasting glucose   • Hypokalemia   • Transaminitis   • R flank pain with bilateral hydronephrosis   • Adrenal nodule (HCC)   • Bleeding from colostomy stoma [facility])   • Other specified anemias   • Thrombocytopenia ([address])   • Nonrheumatic mitral valve regurgitation   • Advance directive discussed with patient   • Gross hematuria   • Skin lesion   • Abnormal CT of the chest   • Kidney calculi   • Dysuria   • Chest pain   • Abnormal TSH   • Port-A-Cath in place   • Drug-induced constipation   • Palliative care patient   • Cancer related pain   • Rectal cancer (HCC)   • Preop examination   • Shingles   • Partial small bowel obstruction (HCC)   • Scalp pain   • Left ear pain   • Chronic anticoagulation   • Neuralgia involving scalp   • Anxiety about health   • 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   • Acute UTI   • Ureteral stricture         Past Medical History     Past Medical History:   Diagnosis Date   • Cancer ([address])    • Colon cancer ([address])    • Fall    • Headache    • Hemochromatosis, unspecified    • History of transfusion     2022 - no adverse reaction   • Hypertension    • Kidney calculi    • Kidney stone    • Liver disease     hemangioma   • Muscle weakness    • Personal history of COVID-19 12/2022   • Sarcoidosis    • Seizures ([address])     2022         Surgical History     Past Surgical History:   Procedure Laterality Date   • ABSCESS DRAINAGE      left breast   • BREAST BIOPSY     • CHEST TUBE INSERTION     • COLON SURGERY      colostomy   • COLONOSCOPY     • FL GUIDED CENTRAL VENOUS ACCESS DEVICE INSERTION  03/21/2023   • FL RETROGRADE PYELOGRAM  01/23/2023   • FL RETROGRADE PYELOGRAM  04/03/2023   • FL RETROGRADE PYELOGRAM  7/21/2023   • IR BIOPSY LIVER MASS  05/09/2023   • LUNG BIOPSY     • [place] CYSTO BLADDER W/URETERAL CATHETERIZATION Bilateral 07/21/2023    Procedure: CYSTOSCOPY URETEROSCOPY RETROGRADE PYELOGRAM WITH BILATERAL STENT URETERAL EXCHANGE; LEFT LASER LITHOTRIPSY AND STONE BASKET RETRIEVAL;  Surgeon: Mati Will MD;  [facility];  Service: Urology   • VT CYSTO/URETERO W/LITHOTRIPSY &INDWELL STENT INSRT Bilateral 01/23/2023    Procedure: CYSTOSCOPY  RIGHT URETEROSCOPY WITH LITHOTRIPSY HOLMIUM LASER, BILATERAL RETROGRADE PYELOGRAM AND BILATERAL  URETERAL STENT INSERTION;  Surgeon: Chioma Chin MD;  [facility];  Service: Urology   • [place] CYSTO/URETERO W/LITHOTRIPSY &INDWELL STENT INSRT Right 04/03/2023    Procedure: RIGHT URETEROSCOPY WITH LITHOTRIPSY HOLMIUM LASER, RETROGRADE PYELOGRAM; BILATERAL EXCHANGE STENT URETERAL;  Surgeon: Chioma Chin MD;  [facility];  Service: Urology   • NV INSJ TUNNELED CTR VAD W/SUBQ PORT AGE 5 YR/> N/A 03/21/2023    Procedure: INSERTION VENOUS PORT ( PORT-A-CATH) IR;  Surgeon: Marybeth Thayer DO;  [facility];  Service: Interventional Radiology   • NV LAPS COLECTOMY PRTL W/COLOPXTSTMY LW ANAST N/A 8/17/2023    Procedure: RESECTION COLON LOW ANTERIOR LAPAROSCOPIC WITH ROBOTICS;  Surgeon: Marce Hill MD;  [facility];  Service: Colorectal   • TONSILLECTOMY     • URINARY SURGERY Bilateral     bilateral stents         Family History     Family History   Problem Relation Age of Onset   • Cancer Mother    • Cirrhosis Father    • Breast cancer Neg Hx    • Breast cancer additional onset Neg Hx          Social History     Social History     Social History     Tobacco Use   Smoking Status Never   • Passive exposure: Past   Smokeless Tobacco Never         Pertinent Lab Values     Lab Results   Component Value Date    CREATININE 0.82 09/25/2023       No results found for: "PSA"    @RESULTRCNT(1H])@      Pertinent Imaging       No results found. Portions of the record may have been created with voice recognition software. Occasional wrong word or "sound a like" substitutions may have occurred due to the inherent limitations of voice recognition software. In addition some of the content generated from this outpatient encounter includes information designed for patient education and/or communication back to the referring provider. Read the chart carefully and recognize, using context, where substitutions have occurred.

## 2023-09-29 NOTE — TELEPHONE ENCOUNTER
Prior Authorization initiated for   Acetaminophen 500 mg  via [facility]    ID# [id number]  Phone#  [facility]:      Additional Clinical information  Attached

## 2023-09-29 NOTE — TELEPHONE ENCOUNTER
I spoke with Angela Alcocer and confirmed scheduling pt for her procedure with Dr. Alli Brown at the [facility] on 10/11/23. I verbally went over all of pt.'s pre op instructions and prep information. Per request I will be mailing pt a copy of her surgical packet and instructed her to call our office with any questions or concerns regarding this procedure.

## 2023-10-01 LAB
BACTERIA UR CULT: ABNORMAL
BACTERIA UR CULT: ABNORMAL

## 2023-10-03 ENCOUNTER — OFFICE VISIT (OUTPATIENT)
Dept: HEMATOLOGY ONCOLOGY | Facility: CLINIC | Age: 58
End: 2023-10-03
Payer: COMMERCIAL

## 2023-10-03 ENCOUNTER — PATIENT MESSAGE (OUTPATIENT)
Dept: PALLIATIVE MEDICINE | Facility: CLINIC | Age: 58
End: 2023-10-03

## 2023-10-03 VITALS
OXYGEN SATURATION: 98 % | DIASTOLIC BLOOD PRESSURE: 78 MMHG | WEIGHT: 148 LBS | TEMPERATURE: 98.3 F | HEART RATE: 109 BPM | RESPIRATION RATE: 17 BRPM | SYSTOLIC BLOOD PRESSURE: 124 MMHG | BODY MASS INDEX: 27.23 KG/M2 | HEIGHT: 62 IN

## 2023-10-03 DIAGNOSIS — N39.0 ACUTE UTI: Primary | ICD-10-CM

## 2023-10-03 DIAGNOSIS — C20 RECTAL CANCER (HCC): ICD-10-CM

## 2023-10-03 DIAGNOSIS — E87.6 HYPOKALEMIA: ICD-10-CM

## 2023-10-03 DIAGNOSIS — K56.600 PARTIAL SMALL BOWEL OBSTRUCTION (HCC): ICD-10-CM

## 2023-10-03 DIAGNOSIS — C18.7 MALIGNANT NEOPLASM OF SIGMOID COLON (HCC): Primary | ICD-10-CM

## 2023-10-03 PROCEDURE — 99215 OFFICE O/P EST HI 40 MIN: CPT | Performed by: INTERNAL MEDICINE

## 2023-10-03 RX ORDER — AMOXICILLIN AND CLAVULANATE POTASSIUM 875; 125 MG/1; MG/1
1 TABLET, FILM COATED ORAL EVERY 12 HOURS SCHEDULED
Qty: 10 TABLET | Refills: 0 | Status: SHIPPED | OUTPATIENT
Start: 2023-10-03 | End: 2023-10-08

## 2023-10-03 RX ORDER — POTASSIUM CHLORIDE 750 MG/1
40 CAPSULE, EXTENDED RELEASE ORAL 2 TIMES DAILY
Qty: 240 CAPSULE | Refills: 0 | Status: SHIPPED | OUTPATIENT
Start: 2023-10-03 | End: 2023-11-02

## 2023-10-03 NOTE — PROGRESS NOTES
Nathalie Monzon  1965  [address] [facility]  [address] [facility] [id number]     DISCUSSION/SUMMARY:    51-year-old female previously diagnosed with rectal cancer. Issues:     stones. Patient with a history of obstruction and hematuria. Patient is being followed by urology. Work-up/treatment is in process. Sarcoidosis. Patient also being followed by hepatology as well as pulmonary. Rectal cancer. Initial clinical stage from another institution is cT3 cN0 cM0. Patient required upfront diverting procedure - reason not entirely clear (patient did not know), presumed pain, bleeding, obstruction. Mrs. Mala Hughes apparently was able to tolerate the first cycle of CapeOX but developed a severe reaction on the second cycle. There are notes in Care Everywhere where the plan at [facility] was to desensitize the patient to oxaliplatin. Case previously discussed with Dr. Rayne Hampton, radiation oncology. The plan was to move forward with long course chemo RT as long as there was no evidence for metastases. Patient tolerated the concurrent 5-FU with RT. Patient then went on to surgery. NCCN guidelines 4.2022 state that for patients with T3, N any with clear CRM by MRI, neoadjuvant options include the standard chemotherapy for 3 to 4 months, long course chemo RT and then restaging with evaluation for resection. Another option includes upfront long course chemo RT with either capecitabine or fusion or 5-FU. Presently Mrs. Mala Hughes feels well and clinically there are no concerning findings. Pathology results are listed below; patient was found to have ypT2 [2.5 cm] ypN0 [0/17] disease. Patient will continue with surveillance. Patient is being followed by colorectal surgery. If no evidence of recurrence, patient will eventually undergo reanastomosis. Patient is to return in 8 weeks with blood work before. Mrs. Mala Hughes knows to call the hematology/oncology office if there are any other questions or concerns. Carefully review your medication list and verify that the list is accurate and up-to-date. Please call the hematology/oncology office if there are medications missing from the list, medications on the list that you are not currently taking or if there is a dosage or instruction that is different from how you're taking that medication. Patient goals and areas of care: Postoperative rectal cancer surveillance  Barriers to care: none  Patient is able to self-care  _____________________________________________________________________________________    Chief Complaint   Patient presents with   • Follow-up   • Rectosigmoid adenocarcinoma status post resection     Oncology History   Malignant neoplasm of sigmoid colon ([address])   10/28/2022 Biopsy    Colon, Rectosigmoid Colon Mass Biopsy ([address] [place] [address], collected 10/28/2022):  - Adenocarcinoma arising in a tubular adenoma     12/1/2022 Biopsy    DIAGNOSIS LIVER, SEGMENT 3, RESECTION:  - HYALINIZED SUBCAPSULAR AND INTRA-PARENCHYMAL NODULES. NO MALIGNANCY SEEN. SEE NOTE  - MILD MACROVESICULAR STEATOSIS (25%). NO EVIDENCE OF STEATOHEPATITIS. TRICHOME STAIN SHOWS MILD PORTAL FIBROSIS.   - 2+ IRON DEPOSITION WITHIN KUPFFER CELLS (IRON STAIN), CONSISTENT WITH SECONDARY HEMOCHROMATOSIS. NOTE: THIS LIKELY REPRESENTS A MARKEDLY SCLEROTIC HEMANGIOMA OR CHANGES SECONDARY TO INJURY. 12/1/2022 Surgery    Laparoscopic diverting colostomy with liver biopsy. Dr. Ryan Alfaro.      12/25/2022 Initial Diagnosis    Malignant neoplasm of sigmoid colon ([address])     2022 - 1/10/2023 Chemotherapy    Oxaliplatin.  [address] [facility]     2/24/2023 -  Cancer Staged    Staging form: Colon and Rectum, AJCC 8th Edition  - Clinical: Stage IIA (cT3, cN0, cM0) - Signed by Sandoval Staples MD on 2/24/2023  Total positive nodes: 0       4/17/2023 -  Chemotherapy potassium chloride, 20 mEq, Intravenous, Once, 3 of 3 cycles  Administration: 20 mEq (4/17/2023), 20 mEq (4/24/2023), 20 mEq (5/1/2023)  alteplase (CATHFLO), 2 mg, Intracatheter, Every 1 Minute as needed, 6 of 6 cycles  fluorouracil (ADRUCIL) ambulatory infusion Soln, 225 mg/m2/day = 1,880 mg, Intravenous, Over 120 hours, 6 of 6 cycles     4/17/2023 - 5/25/2023 Radiation    Treatments:  Course: C1    Plan ID Energy Fractions Dose per Fraction (cGy) Dose Correction (cGy) Total Dose Delivered (cGy) Elapsed Days   CD Rectum 6X 3 / 3 180 0 540 2   Whole Pelvis 6X 25 / 25 180 0 4,500 35      Treatment Dates:  4/17/2023 - 5/25/2023. Rectal cancer ([address])   7/14/2023 Initial Diagnosis    Rectal cancer ([address])     9/6/2023 -  Cancer Staged    Staging form: Colon and Rectum, AJCC 8th Edition  - Clinical: Stage IIA (cT3, cN0, cM0) - Signed by Seven Riddle MD on 9/6/2023  Total positive nodes: 0         History of Present Illness: 62-year-old female previously diagnosed with rectal adenocarcinoma recently moving into [facility] and in need of a new medical oncologist.  Patient has a complicated cancer history; turns for follow-up. Patient was diagnosed with cT3 cN0 cM0 rectal adenocarcinoma in the [facility]. Although the specifics are not clear, patient required a diverting ileostomy. Patient was then started on neoadjuvant CapeOx. Patient was able to get through the first cycle but then had a reaction on the second cycle. At that time, Mrs. Juno Renae and her  decided to move to [place]. Patient was treated somewhat with the Xeloda only but had not been treated for 2 months. Patient also has a history of sarcoidosis, on steroids (being tapered). Options were previously discussed at length with Dr. Kanika Jimenez, radiation oncology and the plan was to proceed to concurrent chemoRT. Patient received 5-FU with RT and then went on to surgery. Presently patient states feeling well. Colostomy is working well. No nausea vomiting, no diarrhea or constipation, no GI bleeding. Patient has had recent issues with stones, being followed by . Appetite is good, weight is stable. Activities are close to baseline. No pain control issues. Review of Systems   Constitutional: Positive for appetite change. Negative for activity change and fatigue. HENT: Negative. Eyes: Negative. Respiratory: Negative. Cardiovascular: Negative. Gastrointestinal: Negative. Endocrine: Negative. Genitourinary: Negative. Musculoskeletal: Negative. Skin: Negative. Allergic/Immunologic: Negative. Neurological: Negative. Hematological: Negative. Psychiatric/Behavioral: Negative. All other systems reviewed and are negative.       Patient Active Problem List   Diagnosis   • Malignant neoplasm of sigmoid colon ([address])   • History of Clostridium difficile colitis   • Other hemochromatosis   • Hypertension   • Sarcoidosis   • Impaired fasting glucose   • Hypokalemia   • Transaminitis   • R flank pain with bilateral hydronephrosis   • Adrenal nodule (HCC)   • Bleeding from colostomy stoma [facility])   • Other specified anemias   • Thrombocytopenia ([address])   • Nonrheumatic mitral valve regurgitation   • Advance directive discussed with patient   • Gross hematuria   • Skin lesion   • Abnormal CT of the chest   • Kidney calculi   • Dysuria   • Chest pain   • Abnormal TSH   • Port-A-Cath in place   • Drug-induced constipation   • Palliative care patient   • Cancer related pain   • Rectal cancer (HCC)   • Preop examination   • Shingles   • Partial small bowel obstruction (HCC)   • Scalp pain   • Left ear pain   • Chronic anticoagulation   • Neuralgia involving scalp   • Anxiety about health   • 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   • Acute UTI   • Ureteral stricture     Past Medical History:   Diagnosis Date   • Cancer [facility])    • Colon cancer ([address])    • Fall    • Headache    • Hemochromatosis, unspecified    • History of transfusion     2022 - no adverse reaction   • Hypertension    • Kidney calculi    • Kidney stone    • Liver disease     hemangioma   • Muscle weakness    • Personal history of COVID-19 12/2022   • Sarcoidosis    • Seizures ([address])     2022     Past Surgical History:   Procedure Laterality Date   • ABSCESS DRAINAGE      left breast   • BREAST BIOPSY     • CHEST TUBE INSERTION     • COLON SURGERY      colostomy   • COLONOSCOPY     • FL GUIDED CENTRAL VENOUS ACCESS DEVICE INSERTION  03/21/2023   • FL RETROGRADE PYELOGRAM  01/23/2023   • FL RETROGRADE PYELOGRAM  04/03/2023   • FL RETROGRADE PYELOGRAM  7/21/2023   • IR BIOPSY LIVER MASS  05/09/2023   • LUNG BIOPSY     • TN CYSTO BLADDER W/URETERAL CATHETERIZATION Bilateral 07/21/2023    Procedure: CYSTOSCOPY URETEROSCOPY RETROGRADE PYELOGRAM WITH BILATERAL STENT URETERAL EXCHANGE; LEFT LASER LITHOTRIPSY AND STONE BASKET RETRIEVAL;  Surgeon: Mary Ann Mann MD;  [facility];  Service: Urology   • TN CYSTO/URETERO W/LITHOTRIPSY &INDWELL STENT INSRT Bilateral 01/23/2023    Procedure: CYSTOSCOPY  RIGHT URETEROSCOPY WITH LITHOTRIPSY HOLMIUM LASER, BILATERAL RETROGRADE PYELOGRAM AND BILATERAL  URETERAL STENT INSERTION;  Surgeon: Mary Ann Mann MD;  [facility];  Service: Urology   • TN CYSTO/URETERO W/LITHOTRIPSY &INDWELL STENT INSRT Right 04/03/2023    Procedure: RIGHT URETEROSCOPY WITH LITHOTRIPSY HOLMIUM LASER, RETROGRADE PYELOGRAM; BILATERAL EXCHANGE STENT URETERAL;  Surgeon: Mary Ann Mann MD;  [facility];  Service: Urology   • TN INSJ TUNNELED CTR VAD W/SUBQ PORT AGE 5 YR/> N/A 03/21/2023    Procedure: INSERTION VENOUS PORT ( PORT-A-CATH) IR;  Surgeon: Byron Mak DO;  [facility];  Service: Interventional Radiology   • TN LAPS COLECTOMY PRTL W/COLOPXTSTMY LW ANAST N/A 8/17/2023    Procedure: RESECTION COLON LOW ANTERIOR LAPAROSCOPIC WITH ROBOTICS;  Surgeon: Salvador Haley MD;  [facility];  Service: Colorectal   • TONSILLECTOMY     • URINARY SURGERY Bilateral     bilateral stents     Family History   Problem Relation Age of Onset   • Cancer Mother    • Cirrhosis Father    • Breast cancer Neg Hx    • Breast cancer additional onset Neg Hx      Social History     Socioeconomic History   • Marital status:      Spouse name: Not on file   • Number of children: Not on file   • Years of education: Not on file   • Highest education level: Not on file   Occupational History   • Not on file   Tobacco Use   • Smoking status: Never     Passive exposure: Past   • Smokeless tobacco: Never   Vaping Use   • Vaping Use: Never used   Substance and Sexual Activity   • Alcohol use: Never   • Drug use: Never   • Sexual activity: Not Currently   Other Topics Concern   • Not on file   Social History Narrative    From 4/14/23  note:    Emergency Contact: Makeda Mckeon (ex-) [phone] (Mobile)    Marital Status:     Caregiver/Support: ex- -Sy Cooper and two dtrs. Children: two dtrs- nataliia 21 in [facility] and [place] [address] [facility]    Child/Elder care: no     Housing: two story house    Home Setup: one level    Lives With:  -anay and two dtrs    Daily Living Activities: independent    Durable Medical Equipment: No    Ambulation: independent    Employment: disabled-SSI-$100 and pension-$700     Status/Location: no     Ability to pay bills: yes. No barriers to paying monthly bills. POA/LW/AD: no.  - Sy Cooper is healthcare representative. MSW emailed advance directive.           Social Determinants of Health     Financial Resource Strain: Not on file   Food Insecurity: No Food Insecurity (8/18/2023)    Hunger Vital Sign    • Worried About Running Out of Food in the Last Year: Never true    • Ran Out of Food in the Last Year: Never true   Transportation Needs: No Transportation Needs (8/18/2023)    [facility] • Lack of Transportation (Medical): No    • Lack of Transportation (Non-Medical): No   Physical Activity: Not on file   Stress: Not on file   Social Connections: Not on file   Intimate Partner Violence: Not on file   Housing Stability: Low Risk  (8/18/2023)    Housing Stability Vital Sign    • Unable to Pay for Housing in the Last Year: No    • Number of Places Lived in the Last Year: 1    • Unstable Housing in the Last Year: No       Current Outpatient Medications:   •  acetaminophen (TYLENOL) 500 mg tablet, Take 2 tablets (1,000 mg total) by mouth 3 (three) times a day, Disp: 180 tablet, Rfl: 0  •  amitriptyline (ELAVIL) 10 mg tablet, Take 1 tablet (10 mg total) by mouth daily at bedtime, Disp: 60 tablet, Rfl: 0  •  amLODIPine (NORVASC) 10 mg tablet, Take 1 tablet (10 mg total) by mouth daily (Patient taking differently: Take 20 mg by mouth daily), Disp: 90 tablet, Rfl: 3  •  amoxicillin-clavulanate (AUGMENTIN) 875-125 mg per tablet, Take 1 tablet by mouth every 12 (twelve) hours for 5 days, Disp: 10 tablet, Rfl: 0  •  cyanocobalamin (VITAMIN B-12) 1000 MCG tablet, Take 1,000 mcg by mouth daily, Disp: , Rfl:   •  docusate sodium (COLACE) 100 mg capsule, Take 1 capsule (100 mg total) by mouth 2 (two) times a day - hold if you are experiencing diarrhea, Disp: 60 capsule, Rfl: 2  •  gabapentin (Neurontin) 300 mg capsule, Take 1 capsule (300 mg total) by mouth every 8 (eight) hours To reduce shingles pain, Disp: 90 capsule, Rfl: 2  •  potassium chloride (MICRO-K) 10 MEQ CR capsule, Take 4 capsules (40 mEq total) by mouth 2 (two) times a day, Disp: 240 capsule, Rfl: 0  •  predniSONE 10 mg tablet, Take 1 tablet (10 mg total) by mouth daily Please take for 4 weeks and then decrease by 5 mg increments every 2 weeks.  -- Taking 20mg and then 10mg - titrating, Disp: 30 tablet, Rfl: 2  •  ursodiol (ACTIGALL) 300 mg capsule, Take 3 capsules (900 mg total) by mouth in the morning, Disp: 90 capsule, Rfl: 11  •  enoxaparin (LOVENOX) 40 mg/0.4 mL, Inject 0.4 mL (40 mg total) under the skin every 24 hours for 24 days (Patient not taking: Reported on 9/29/2023), Disp: 9.6 mL, Rfl: 0  •  methocarbamol (ROBAXIN) 500 mg tablet, Take 1 tablet (500 mg total) by mouth every 8 (eight) hours for 5 days (Patient not taking: Reported on 10/3/2023), Disp: 15 tablet, Rfl: 0  •  ondansetron (ZOFRAN) 4 mg tablet, Take 1 tablet (4 mg total) by mouth every 8 (eight) hours as needed for nausea or vomiting (Patient not taking: Reported on 9/29/2023), Disp: 20 tablet, Rfl: 0  •  oxybutynin (DITROPAN) 5 mg tablet, Take 1 tablet (5 mg total) by mouth every 6 (six) hours as needed (bladder spasms) (Patient not taking: Reported on 9/29/2023), Disp: 30 tablet, Rfl: 0  •  oxyCODONE (Roxicodone) 5 immediate release tablet, Take 1 tablet (5 mg total) by mouth every 4 (four) hours as needed for moderate pain or severe pain Max Daily Amount: 30 mg (Patient not taking: Reported on 9/29/2023), Disp: 180 tablet, Rfl: 0    Allergies   Allergen Reactions   • Oxaliplatin Shortness Of Breath     Reactions occurred with 2nd and 3rd infusions (about 1-3 hours from initiation of infusion) and required treatment with steroids and antihistamines. Please refer to allergy note on 2/7/2023 for detailed description of her reactions. • Morphine Nausea Only     "Every dose made me nauseous" (oral dosing)       Vitals:    10/03/23 1234   BP: 124/78   Pulse: (!) 109   Resp: 17   Temp: 98.3 °F (36.8 °C)   SpO2: 98%     Physical Exam  Constitutional:       Appearance: She is well-developed. HENT:      Head: Normocephalic and atraumatic. Right Ear: External ear normal.      Left Ear: External ear normal.   Eyes:      Conjunctiva/sclera: Conjunctivae normal.      Pupils: Pupils are equal, round, and reactive to light. Cardiovascular:      Rate and Rhythm: Normal rate and regular rhythm. Heart sounds: Normal heart sounds.    Pulmonary:      Effort: Pulmonary effort is normal. Breath sounds: Normal breath sounds. Comments: Clear bilaterally  Abdominal:      General: Bowel sounds are normal.      Palpations: Abdomen is soft. Comments: + Bowel sounds, left upper quadrant ostomy in place, nontender, distended, no fluid, no rigidity or rebound   Musculoskeletal:         General: Normal range of motion. Cervical back: Normal range of motion and neck supple. Skin:     General: Skin is warm. Comments: Good color, warm, moist, no petechiae or ecchymosis   Neurological:      Mental Status: She is alert and oriented to person, place, and time. Deep Tendon Reflexes: Reflexes are normal and symmetric. Psychiatric:         Behavior: Behavior normal.         Thought Content:  Thought content normal.         Judgment: Judgment normal.     Extremities: No adenopathy in the neck, supraclavicular chain, axilla bilaterally  Lymphatics: no adenopathy in the neck, supraclavicular region, axilla and groin bilaterally    Performance Status: 1 - Symptomatic but completely ambulatory    Labs    9/25/2023 WBC = 6.56 hemoglobin = 9.5 hematocrit = 31.5 platelet = 109 neutrophil = 83% BUN = 23 creatinine = 0.82 calcium = 9.8 AST = 37 ALT = 76 alkaline phosphatase = 310 total bilirubin = 0.63    4/29/2023 CEA = 1.5  4/29/2023 WBC = 4.58 hemoglobin = 11.1 hematocrit = 36.6 platelet = 115 neutrophil = 84% potassium = 2.8 BUN = 19 creatinine = 0.73 alkaline phosphatase = 315    4/6/2023 hereditary hemochromatosis mutation analysis = no mutations identified    3/14/2023 WBC = 5.84 hemoglobin = 11.4 hematocrit = 36.6 MCV = 100 platelet = 851  5/50/1338 potassium = 2.8 BUN = 14 creatinine = 0.82 glucose = 293 calcium = 9.0 AST = 49 ALT = 58 alkaline phosphatase = 366 total protein = 6.2 total bilirubin = 1.3    Imaging    8/23/2023 CAT scan abdomen pelvis with contrast    IMPRESSION:     Multiple loops of abnormally dilated proximal to mid small bowel with relative collapse of the distal small bowel most compatible with small bowel obstruction. A clear transition point is not identified. Surgical consultation is advised.     A few scattered tiny foci of free intraperitoneal air are noted likely postoperative in nature in this patient with a history of recent low anterior colon resection with right mid abdominal ileostomy.     Small amount of intra-abdominal and pelvic ascites.     Stable position of bilateral ureteral stents with no significant hydronephrosis. 3/18/2023 CAT scan chest abdomen pelvis with contrast    IMPRESSION:     1. Probably right upper lobe interstitial opacities are nonspecific and could be due to infection or inflammation of the appropriate clinical context or fibrosis. Malignancy is felt to be less likely but not excluded. May consider short-term follow-up imaging in 2-3 months for reassessment. 2.  Consolidative changes in the left lower lobe and inferior lingula may be due to atelectasis. 3.  1.6 cm heterogeneous nodule the right lobe of the thyroid gland, recommend correlation with thyroid ultrasound if not obtained within the prior 6 months. 4.  There is an ill-defined area of hypoattenuation within the inferior right hepatic lobe of uncertain etiology. Not appreciated on the previous exam.  Consider further assessment with contrast-enhanced MRI. 5.  Bilateral ureteral stents with calcifications suspected along the proximal right ureteral stent few scattered calcifications along the mid to distal left ureteral stent.   6.  Additional findings discussed above.       3/17/2023 MRI pelvics rectal cancer staging    IMPRESSION:  Unenhanced MRI of the Pelvis (Rectal Protocol)      SINCE MRI PELVIS RECTAL CANCER STAGING DATED 11/1/2022, POST TREATMENT PRIMARY TUMOR ASSESSMENT: Incomplete response (likely residual tumor.) Please note, though incomplete, there has been substantial tumor response to treatment with a significant   decrease in size of the high rectal cancer since MRI 11/1/2022.      SUSPICIOUS MESORECTAL LYMPH NODES: No.     SUSPICIOUS EXTRAMESORECTAL LYMPH NODES: No.       2/11/2023 CAT scan abdomen pelvis with contrast    IMPRESSION:     Interval decrease in right-sided hydronephrosis.     Thickening of the ascending colon and cecum which may represent colitis. Follow-up with gastroenterology.     Stable 1 cm right adrenal gland nodule. Although its imaging features are indeterminate, it does not have suspicious imaging features (heterogeneity, necrosis, irregular margins), therefore this is likely benign, and can be followed by non-contrast abdomen CT or MRI in 12 months. If patient has history of adrenal hyperfunction, consider biochemical evaluation. Pathology    Case Report   Surgical Pathology Report                         Case: [id number]                                    Authorizing Provider: Devin Hazel MD      Collected:           08/17/2023 1020               Ordering Location:     [address]      Received:            08/17/2023 1218                                      Hospital Operating Room                                                       Pathologist:           Abdelrahman Carlton MD                                                          Specimens:   A) - Colon, Low Anterior Resection. Additional proximal colon "Ostomy"- Tattoo marks                 distal  (DMT)                                                                                        B) - Anastomatic site, Final Distal Rectal Margin (DMT)                                    Final Diagnosis   A.  Rectosigmoid (low anterior resection):  - Adenocarcinoma arising in a tubular adenoma (2.5cm)  - Seventeen (17) lymph nodes negative for carcinoma (0/17)  - Margins negative for carcinoma   - See staging synoptic (ypT2N0)     Comment:  - MMR IHC was performed on the prior outside biopsy showing no loss of MMR IHC nuclear expression: Low risk of MSI-H.   - D2-40 and CD31 highlight vessels.      B. Colon, final distal margin (excision):  - No carcinoma identified     Interpretation performed at [facility], [address] [place] [address], [facility], [address] [place] [address]      Amendment electronically signed by Raul Gross MD on 8/24/2023 at  7:22 AM   Electronically signed by Raul Gross MD on 8/24/2023 at  6:59 AM   Additional Information    All reported additional testing was performed with appropriately reactive controls.  These tests were developed and their performance characteristics determined by [facility] or appropriate performing facility, though some tests may be performed on tissues which have not been validated for performance characteristics (such as staining performed on alcohol exposed cell blocks and decalcified tissues).  Results should be interpreted with caution and in the context of the patients’ clinical condition. These tests may not be cleared or approved by the U.S. Food and Drug Administration, though the FDA has determined that such clearance or approval is not necessary. These tests are used for clinical purposes and they should not be regarded as investigational or for research. This laboratory has been approved by [place], designated as a high-complexity laboratory and is qualified to perform these tests.   .   Synoptic Checklist   COLON AND RECTUM: Resection, Including Transanal Disk Excision of Rectal Neoplasms  8th Edition - Protocol posted: 6/22/2022  COLON AND RECTUM: RESECTION - All Specimens  SPECIMEN   Procedure  Low anterior resection    Macroscopic Evaluation of Mesorectum  Near complete    TUMOR   Tumor Site  Rectosigmoid      Rectum    Rectal Tumor Location  Entirely above anterior peritoneal reflection    Histologic Type  Adenocarcinoma    Histologic Grade  G2, moderately differentiated    Tumor Size  Greatest dimension (Centimeters): 2.5 cm   Tumor Extent  Invades into muscularis propria    Macroscopic Tumor Perforation  Not identified    Lymphovascular Invasion  Not identified    Perineural Invasion  Not identified    Number of Tumor Buds  3 per 'hotspot' field   Tumor Bud Score  Low (0-4)    Type of Polyp in which Invasive Carcinoma Arose  Tubular adenoma    Treatment Effect  Present, with residual cancer showing evident tumor regression, but more than single cells or rare small groups of cancer cells (partial response, score 2)    MARGINS   Margin Status for Invasive Carcinoma  All margins negative for invasive carcinoma    Closest Margin(s) to Invasive Carcinoma  Radial (circumferential) or mesenteric    Distance from Invasive Carcinoma to Closest Margin  3.5 cm   Distance from Invasive Carcinoma to Radial (Circumferential) Margin  Distance already reported as closest margin    Distance from Invasive Carcinoma to Distal Margin  Greater than 1 cm    Margin Status for Non-Invasive Tumor  All margins negative for high-grade dysplasia / intramucosal carcinoma and low-grade dysplasia    REGIONAL LYMPH NODES   Regional Lymph Node Status  All regional lymph nodes negative for tumor    Number of Lymph Nodes Examined  17    Tumor Deposits  Not identified    PATHOLOGIC STAGE CLASSIFICATION (pTNM, AJCC 8th Edition)   Reporting of pT, pN, and (when applicable) pM categories is based on information available to the pathologist at the time the report is issued. As per the AJCC (Chapter 1, 8th Ed.) it is the managing physician's responsibility to establish the final pathologic stage based upon all pertinent information, including but potentially not limited to this pathology report.    TNM Descriptors  y (post-treatment)    pT Category  pT2    pN Category  pN0    ADDITIONAL FINDINGS   Additional Findings  None identified               Case Report   Surgical Pathology Report                         Case: [id number]                                    Authorizing Provider: Khloe Love MD           Collected:           02/22/2023 0731             Ordering Location:     [facility]      Received:            02/22/2023 [address] [place] [address] Specialty                                                                                   Laboratory                                                                    Pathologist:           Joey Monte MD                                                                  Specimen:    Colon, Rectosigmoid Colon Mass Biopsy (1 slide [id number] [address] [place] [address], collected 10/28/2022)                                                              Final Diagnosis   A.  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     Note: Per outside report (slides were not available for review).      RESULTS OF IMMUNOHISTOCHEMICAL ANALYSIS FOR MISMATCH REPAIR PROTEIN LOSS     RESULTS:  Antibody            Clone                 Description                     Results  MLH1                 M1                     Mismatch repair protein  Intact nuclear expression  MSH2                [id number]        Mismatch repair protein  Intact nuclear expression  MSH6                SP93                  Mismatch repair protein  Intact nuclear expression  PMS2                 A16-4                 Mismatch repair protein  Intact nuclear expression      Electronically signed by Joey Monte MD on 2/22/2023 at 10:11 AM

## 2023-10-03 NOTE — TELEPHONE ENCOUNTER
Received prior authorization approval for  Acetaminophen 500 mg 09/29/23  through 09/23/24    Faxed results to Pharmacy. Pharmacy has been asked to inform pt of outcome.

## 2023-10-04 ENCOUNTER — ANESTHESIA EVENT (OUTPATIENT)
Dept: PERIOP | Facility: AMBULARY SURGERY CENTER | Age: 58
End: 2023-10-04
Payer: COMMERCIAL

## 2023-10-04 NOTE — PRE-PROCEDURE INSTRUCTIONS
Pre-Surgery Instructions:   Medication Instructions   • acetaminophen (TYLENOL) 500 mg tablet Uses PRN- OK to take day of surgery   • amitriptyline (ELAVIL) 10 mg tablet Take night before surgery   • amLODIPine (NORVASC) 10 mg tablet Take day of surgery. • cyanocobalamin (VITAMIN B-12) 1000 MCG tablet Stop taking 7 days prior to surgery. • gabapentin (Neurontin) 300 mg capsule Take day of surgery. • oxybutynin (DITROPAN) 5 mg tablet Hold day of surgery. • potassium chloride (MICRO-K) 10 MEQ CR capsule Hold day of surgery. • predniSONE 10 mg tablet Take day of surgery. • ursodiol (ACTIGALL) 300 mg capsule Take day of surgery. Medication instructions for day surgery reviewed. Please use only a sip of water to take your instructed medications. Avoid all aspirin and over the counter vitamins, supplements and NSAIDS for one week prior to surgery per anesthesia guidelines. Tylenol is ok to take as needed. You will receive a call one business day prior to surgery with an arrival time and hospital directions. If your surgery is scheduled on a Monday, the hospital will be calling you on the Friday prior to your surgery. If you have not heard from anyone by 8pm, please call the hospital supervisor through the hospital  at [phone]. Do not eat or drink anything after midnight the night before your surgery, including candy, mints, lifesavers, or chewing gum. Do not drink alcohol 24hrs before your surgery. Try not to smoke at least 24hrs before your surgery. Follow the pre surgery showering instructions as listed in the [facility] Surgical Experience Booklet” or otherwise provided by your surgeon's office. Do not shave the surgical area 24 hours before surgery. Do not apply any lotions, creams, including makeup, cologne, deodorant, or perfumes after showering on the day of your surgery. No contact lenses, eye make-up, or artificial eyelashes.  Remove nail polish, including gel polish, and any artificial, gel, or acrylic nails if possible. Remove all jewelry including rings and body piercing jewelry. Wear causal clothing that is easy to take on and off. Consider your type of surgery. Keep any valuables, jewelry, piercings at home. Please bring any specially ordered equipment (sling, braces) if indicated. Arrange for a responsible person to drive you to and from the hospital on the day of your surgery. Visitor Guidelines discussed. Call the surgeon's office with any new illnesses, exposures, or additional questions prior to surgery. Please reference your [facility] Surgical Experience Booklet” for additional information to prepare for your upcoming surgery.

## 2023-10-09 ENCOUNTER — OFFICE VISIT (OUTPATIENT)
Dept: PALLIATIVE MEDICINE | Facility: CLINIC | Age: 58
End: 2023-10-09

## 2023-10-09 VITALS
SYSTOLIC BLOOD PRESSURE: 118 MMHG | HEIGHT: 62 IN | BODY MASS INDEX: 27.33 KG/M2 | WEIGHT: 148.5 LBS | DIASTOLIC BLOOD PRESSURE: 70 MMHG | HEART RATE: 115 BPM | OXYGEN SATURATION: 98 % | TEMPERATURE: 96.7 F

## 2023-10-09 DIAGNOSIS — M79.2 NEURALGIA INVOLVING SCALP: ICD-10-CM

## 2023-10-09 DIAGNOSIS — D86.9 SARCOIDOSIS: ICD-10-CM

## 2023-10-09 DIAGNOSIS — Z71.89 ADVANCED DIRECTIVES, COUNSELING/DISCUSSION: ICD-10-CM

## 2023-10-09 DIAGNOSIS — C18.7 MALIGNANT NEOPLASM OF SIGMOID COLON (HCC): Primary | ICD-10-CM

## 2023-10-09 DIAGNOSIS — R51.9 SCALP PAIN: ICD-10-CM

## 2023-10-09 DIAGNOSIS — B02.9 SHINGLES: ICD-10-CM

## 2023-10-09 DIAGNOSIS — Z51.5 PALLIATIVE CARE PATIENT: ICD-10-CM

## 2023-10-09 DIAGNOSIS — G89.3 CANCER RELATED PAIN: ICD-10-CM

## 2023-10-09 DIAGNOSIS — Z71.89 GOALS OF CARE, COUNSELING/DISCUSSION: ICD-10-CM

## 2023-10-09 DIAGNOSIS — F41.8 ANXIETY ABOUT HEALTH: ICD-10-CM

## 2023-10-09 DIAGNOSIS — B02.29 HZV (HERPES ZOSTER VIRUS) POST HERPETIC NEURALGIA: ICD-10-CM

## 2023-10-09 RX ORDER — AMITRIPTYLINE HYDROCHLORIDE 10 MG/1
10 TABLET, FILM COATED ORAL
Qty: 30 TABLET | Refills: 2 | Status: SHIPPED | OUTPATIENT
Start: 2023-10-09

## 2023-10-09 RX ORDER — GABAPENTIN 300 MG/1
300 CAPSULE ORAL
Qty: 30 CAPSULE | Refills: 2 | Status: SHIPPED | OUTPATIENT
Start: 2023-10-09

## 2023-10-09 NOTE — PATIENT INSTRUCTIONS
It was good to see you today. Thank you for coming in. Discontinuing methocarbamol as you haven't been needing it. Change gabapentin to 300mg once per day (at bedtime). Refilling amitriptyline as discussed. Use oxybutynin as needed for bladder spasms, kidney discomfort, etc.  Return in about 4 to 5 weeks (around 11/6/2023). Call us for refills on medications that we supply, as needed. If something changes and you need to come in sooner, please call our office. PRESCRIPTION REFILL REMINDER:  All medication refills should be requested prior to [facility] on Friday. Any refill requests after noon on Friday would be addressed the following Monday. MEDICATION SAFETY ISSUES:  Do not drive under the influence of narcotics (including opioids), watch for adverse effects including confusion / altered mental status / respiratory depression (slowed breathing), keep medications stored in a safe/locked environment, do not use alcohol while opioids or other narcotics are in your system.

## 2023-10-09 NOTE — PROGRESS NOTES
Follow-up with Palliative and Supportive Care  Nathalie Monzon 56 y.o. female [phone]    ASSESSMENT & PLAN:  1. Malignant neoplasm of sigmoid colon ([address])    2. Sarcoidosis    3. Shingles    4. Anxiety about health    5. Cancer related pain    6. Neuralgia involving scalp    7. 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    8. Scalp pain    9. Palliative care patient    10. Goals of care, counseling/discussion          • Continue disease-directed cares. Patient is hopeful for ostomy reversal soon though no date is set yet (per CRS, "4 months from surgery" meaning approximately mid-December if there are no other medical workups in progress. • Patient endorses waxing/waning post-herpetic neuralgia which can impact QOL. o Continue gabapentin but changing to 300mg QHS feels once-daily dosing is sufficient at this time. o Continue second-line TCA, amitriptyline 10mg QHS - she feels this has been helpful. May also help w/ mood and sleep and generalized pain.  o May use oxyIR PRN; she has been counseled on the limits of this class for this indication. • Patient feels oxybutynin has been helpful for  issues; continue. Follow w/ Urology  • Continue oxyIR 5-10mg q4h PRN moderate-severe pain. Not needed recently. • Use Tylenol for adjunct analgesia. • Discontinuing methocarbamol as patient has not been taking. • May use Zofran PRN N/V.  • ACP: Patient has not completed any advanced healthcare directives. She was provided a copy of the [facility] Advanced Directive along with counseling in a prior visit. She had expressed a desire to have her ex- Sy Cooper be listed as a surrogate healthcare decision-maker. She states again today that she is not ready to complete the advanced directive. • Reviewed notes (Medical Oncology, Urology, Colorectal Surgery, Gastroenterology, PCP), labs (9/25/23 Cr 0.82, , ALT 76, ACE 52, Hb 9.5, CRP 11.8), imaging + procedures (8/23/23 XR Abdomen).   • Return in about 4 weeks (around 11/6/2023). • Emotional support provided. • Medication safety issues addressed - no driving under the influence of narcotics (including opioids), watch for adverse effects including AMS or respiratory depression (slowed breathing), keep medications stored in a safe/locked environment, do not use alcohol while opioids or other narcotics are in one's system. Requested Prescriptions     Signed Prescriptions Disp Refills   • amitriptyline (ELAVIL) 10 mg tablet 30 tablet 2     Sig: Take 1 tablet (10 mg total) by mouth daily at bedtime   • gabapentin (Neurontin) 300 mg capsule 30 capsule 2     Sig: Take 1 capsule (300 mg total) by mouth daily at bedtime To reduce shingles pain       Medications Discontinued During This Encounter   Medication Reason   • amitriptyline (ELAVIL) 10 mg tablet Reorder   • methocarbamol (ROBAXIN) 500 mg tablet Alternate therapy   • gabapentin (Neurontin) 300 mg capsule Reorder       Representatives have queried the patient's controlled substance dispensing history in the Prescription Drug Monitoring Program in compliance with regulations before I have prescribed any controlled substances. The prescription history is consistent with prescribed therapy and our practice policies. 35 minutes were spent in this ambulatory visit with greater than 50% of the time spent face to face with patient and her friend + ex- Eloise Alvarez in counseling or coordination of care including symptom assessment and management, medication review, medication adjustment, psychosocial support, chart review, imaging review, lab review, advanced directives, goals of care, supportive listening and anticipatory guidance. All of the patient's questions were answered during this discussion.     SUBJECTIVE:  Chief Complaint   Patient presents with   • Follow-up   • Medication Refill   • Cancer   • Pain   • Goals   • Insomnia   • Counseling        HPI    Dena Frost is a 56 y.o. 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 She follows w/ Dr Flako Cai (Radiation Oncology), Dr Bipin Coffman (Gastroenterology), Dr Russell Daniel (Medical Oncology), Dr Vonnie Davis (Urology), Dr Farzana Reza (Colorectal Surgery). Patient is known to [address] [place] [address]; seen 9/11/23 for symptom assessment and management, medication review, medication adjustment, psychosocial support, chart review, imaging review, lab review, goals of care, opioid titration, supportive listening and anticipatory guidance. Patient states her  issues have responded well to treatments and plan arranged by Dr Vonnie Davis. In particular, oxybutynin has been very helpful for her discomfort. She has not needed to use oxycodone for  discomfort, or for her postherpetic neuralgia. She is taking less gabapentin now than she had at last visit, as she feels the amitriptyline has been helpful for various pains. Patient is hopeful that her ostomy will be reversed soon, though the Colorectal Surgery note states this would not likely happen until at least December 2023. She states she understands she needs to get her urologic issues under control before a major surgery can be planned. Patient is joined by her ex- and friend Mackenzie Fagan again today. Mackenzie Fagan is very supportive. She states she would like Mackenzie Fagan to act as her surrogate healthcare decision maker, or at least leave those decisions if needed. She also states that she is not yet willing to complete an advance directive specifying this. She accepts counseling that her adult daughters would be leading any decisions for her if she lacks capacity to do so on her own, per [place] Act 169. PDMP shows no concerns.     The following portions of the medical history were reviewed: past medical history, surgical history, problem list, medication list, family history, and social history. Current Outpatient Medications:   •  acetaminophen (TYLENOL) 500 mg tablet, Take 2 tablets (1,000 mg total) by mouth 3 (three) times a day, Disp: 180 tablet, Rfl: 0  •  amitriptyline (ELAVIL) 10 mg tablet, Take 1 tablet (10 mg total) by mouth daily at bedtime, Disp: 30 tablet, Rfl: 2  •  amLODIPine (NORVASC) 10 mg tablet, Take 1 tablet (10 mg total) by mouth daily (Patient taking differently: Take 20 mg by mouth daily), Disp: 90 tablet, Rfl: 3  •  cyanocobalamin (VITAMIN B-12) 1000 MCG tablet, Take 1,000 mcg by mouth daily, Disp: , Rfl:   •  docusate sodium (COLACE) 100 mg capsule, Take 1 capsule (100 mg total) by mouth 2 (two) times a day - hold if you are experiencing diarrhea, Disp: 60 capsule, Rfl: 2  •  gabapentin (Neurontin) 300 mg capsule, Take 1 capsule (300 mg total) by mouth daily at bedtime To reduce shingles pain, Disp: 30 capsule, Rfl: 2  •  oxybutynin (DITROPAN) 5 mg tablet, Take 1 tablet (5 mg total) by mouth every 6 (six) hours as needed (bladder spasms), Disp: 30 tablet, Rfl: 0  •  potassium chloride (MICRO-K) 10 MEQ CR capsule, Take 4 capsules (40 mEq total) by mouth 2 (two) times a day, Disp: 240 capsule, Rfl: 0  •  predniSONE 10 mg tablet, Take 1 tablet (10 mg total) by mouth daily Please take for 4 weeks and then decrease by 5 mg increments every 2 weeks.  -- Taking 20mg and then 10mg - titrating, Disp: 30 tablet, Rfl: 2  •  ursodiol (ACTIGALL) 300 mg capsule, Take 3 capsules (900 mg total) by mouth in the morning, Disp: 90 capsule, Rfl: 11  •  ondansetron (ZOFRAN) 4 mg tablet, Take 1 tablet (4 mg total) by mouth every 8 (eight) hours as needed for nausea or vomiting (Patient not taking: Reported on 9/29/2023), Disp: 20 tablet, Rfl: 0  •  oxyCODONE (Roxicodone) 5 immediate release tablet, Take 1 tablet (5 mg total) by mouth every 4 (four) hours as needed for moderate pain or severe pain Max Daily Amount: 30 mg (Patient not taking: Reported on 9/29/2023), Disp: 180 tablet, Rfl: 0    Review of Systems   Constitutional: Positive for fatigue. Gastrointestinal:        Regular, if loose, stool output. Genitourinary: Positive for dysuria. Bladder spasms / discomfort, improves w/ oxybutynin. Skin:        Scalp pain. Allergic/Immunologic: Positive for immunocompromised state. Neurological:        Post-herpetic neuralgia. Psychiatric/Behavioral: Positive for sleep disturbance. The patient is nervous/anxious. All other systems reviewed and are negative. OBJECTIVE:  /70 (BP Location: Left arm, Patient Position: Sitting, Cuff Size: Standard)   Pulse (!) 115   Temp (!) 96.7 °F (35.9 °C) (Temporal)   Ht 5' 2" (1.575 m)   Wt 67.4 kg (148 lb 8 oz)   SpO2 98%   BMI 27.16 kg/m²   Physical Exam  Vitals reviewed. Constitutional:       General: She is not in acute distress. Appearance: She is well-groomed and normal weight. She is not toxic-appearing. HENT:      Head: Normocephalic and atraumatic. Right Ear: External ear normal.      Left Ear: External ear normal.   Eyes:      General: No scleral icterus. Right eye: No discharge. Left eye: No discharge. Extraocular Movements: Extraocular movements intact. Conjunctiva/sclera: Conjunctivae normal.      Pupils: Pupils are equal, round, and reactive to light. Cardiovascular:      Rate and Rhythm: Tachycardia present. Pulmonary:      Effort: Pulmonary effort is normal. No tachypnea, bradypnea, accessory muscle usage or respiratory distress. Comments: Able to speak comfortably in complete sentences on room air at rest.  Abdominal:      General: There is no distension. Tenderness: There is no guarding. Musculoskeletal:      Cervical back: Normal range of motion. Right lower leg: No edema. Left lower leg: No edema. Skin:     General: Skin is dry. Coloration: Skin is not pale. Neurological:      Mental Status: She is alert and oriented to person, place, and time. Cranial Nerves: No dysarthria or facial asymmetry. Comments: Using no assistive devices for ambulation. Psychiatric:         Attention and Perception: Attention normal.        Mood and Affect: Mood and affect normal.        Speech: Speech normal.        Behavior: Behavior normal. Behavior is cooperative. Thought Content: Thought content normal.        Cognition and Memory: Cognition and memory normal.        Judgment: Judgment normal.          Yuri Lau MD  [facility]  [phone]    Portions of this document may have been created using dictation software and as such some "sound alike" terms may have been generated by the system. Do not hesitate to contact me with any questions or clarifications.

## 2023-10-10 NOTE — ANESTHESIA PREPROCEDURE EVALUATION
Procedure:  CYSTOSCOPY URETEROSCOPY WITH LITHOTRIPSY HOLMIUM LASER, RETROGRADE PYELOGRAM AND INSERTION STENT URETERAL (Bilateral: Bladder)    Biopsy proven sarcoidosis on steroids  - on Prednisone 10 mg daily    Normal stress test and EF 55% 2022  Mild mitral regurg shown    Relevant Problems   CARDIO   (+) Chest pain   (+) Hypertension   (+) Nonrheumatic mitral valve regurgitation      GI/HEPATIC   (+) Malignant neoplasm of sigmoid colon (HCC)   (+) Partial small bowel obstruction (HCC)   (+) Rectal cancer (HCC)      /RENAL   (+) Kidney calculi   (+) R flank pain with bilateral hydronephrosis      HEMATOLOGY   (+) Other specified anemias   (+) Thrombocytopenia (HCC)      NEURO/PSYCH   (+) Anxiety about health        Physical Exam    Airway    Mallampati score: II  TM Distance: >3 FB  Neck ROM: full     Dental   No notable dental hx     Cardiovascular  Rhythm: regular, Rate: normal, Cardiovascular exam normal    Pulmonary  Pulmonary exam normal Breath sounds clear to auscultation    Other Findings        Anesthesia Plan  ASA Score- 2     Anesthesia Type- general with ASA Monitors. Additional Monitors:     Airway Plan: LMA. Comment: Risks of general anesthesia discussed including likely possibility of PONV and sore throat, as well as the rare possibilities of aspiration, dental/oropharyngeal/ocular injuries, or grave/life threatening anesthetic and surgical emergencies. .       Plan Factors-Exercise tolerance (METS): >4 METS. Chart reviewed. Patient summary reviewed. Patient instructed to abstain from smoking on day of procedure. Patient did not smoke on day of surgery. Induction- intravenous. Postoperative Plan- Plan for postoperative opioid use. Planned trial extubation    Informed Consent- Anesthetic plan and risks discussed with patient. I personally reviewed this patient with the CRNA. Discussed and agreed on the Anesthesia Plan with the CRNA. Darylene Pipe

## 2023-10-11 ENCOUNTER — HOSPITAL ENCOUNTER (OUTPATIENT)
Facility: AMBULARY SURGERY CENTER | Age: 58
Setting detail: OUTPATIENT SURGERY
Discharge: HOME | End: 2023-10-11
Attending: UROLOGY | Admitting: UROLOGY
Payer: COMMERCIAL

## 2023-10-11 ENCOUNTER — APPOINTMENT (OUTPATIENT)
Dept: RADIOLOGY | Facility: AMBULARY SURGERY CENTER | Age: 58
End: 2023-10-11
Payer: COMMERCIAL

## 2023-10-11 ENCOUNTER — ANESTHESIA (OUTPATIENT)
Dept: PERIOP | Facility: AMBULARY SURGERY CENTER | Age: 58
End: 2023-10-11
Payer: COMMERCIAL

## 2023-10-11 VITALS
WEIGHT: 148 LBS | RESPIRATION RATE: 17 BRPM | OXYGEN SATURATION: 98 % | HEART RATE: 101 BPM | TEMPERATURE: 97.5 F | DIASTOLIC BLOOD PRESSURE: 78 MMHG | BODY MASS INDEX: 27.23 KG/M2 | SYSTOLIC BLOOD PRESSURE: 142 MMHG | HEIGHT: 62 IN

## 2023-10-11 DIAGNOSIS — N13.5 URETERAL STRICTURE: Primary | ICD-10-CM

## 2023-10-11 LAB
DME PARACHUTE DELIVERY DATE ACTUAL: NORMAL
DME PARACHUTE DELIVERY DATE REQUESTED: NORMAL
DME PARACHUTE ITEM DESCRIPTION: NORMAL
DME PARACHUTE ORDER STATUS: NORMAL
DME PARACHUTE SUPPLIER NAME: NORMAL
DME PARACHUTE SUPPLIER PHONE: NORMAL

## 2023-10-11 PROCEDURE — 74420 UROGRAPHY RTRGR +-KUB: CPT

## 2023-10-11 PROCEDURE — C1769 GUIDE WIRE: HCPCS | Performed by: UROLOGY

## 2023-10-11 PROCEDURE — C2617 STENT, NON-COR, TEM W/O DEL: HCPCS | Performed by: UROLOGY

## 2023-10-11 PROCEDURE — C1894 INTRO/SHEATH, NON-LASER: HCPCS | Performed by: UROLOGY

## 2023-10-11 PROCEDURE — C1758 CATHETER, URETERAL: HCPCS | Performed by: UROLOGY

## 2023-10-11 PROCEDURE — 52332 CYSTOSCOPY AND TREATMENT: CPT | Performed by: UROLOGY

## 2023-10-11 PROCEDURE — 52351 CYSTOURETERO & OR PYELOSCOPE: CPT | Performed by: UROLOGY

## 2023-10-11 DEVICE — STENT URETERAL 7FR 24CM INLAY OPTIMA W/HYDROGLIDE GDWR: Type: IMPLANTABLE DEVICE | Site: URETER | Status: FUNCTIONAL

## 2023-10-11 RX ORDER — PROPOFOL 10 MG/ML
INJECTION, EMULSION INTRAVENOUS AS NEEDED
Status: DISCONTINUED | OUTPATIENT
Start: 2023-10-11 | End: 2023-10-11

## 2023-10-11 RX ORDER — CIPROFLOXACIN 2 MG/ML
400 INJECTION, SOLUTION INTRAVENOUS ONCE
Status: COMPLETED | OUTPATIENT
Start: 2023-10-11 | End: 2023-10-11

## 2023-10-11 RX ORDER — KETOROLAC TROMETHAMINE 30 MG/ML
15 INJECTION, SOLUTION INTRAMUSCULAR; INTRAVENOUS ONCE AS NEEDED
Status: DISCONTINUED | OUTPATIENT
Start: 2023-10-11 | End: 2023-10-11 | Stop reason: HOSPADM

## 2023-10-11 RX ORDER — TAMSULOSIN HYDROCHLORIDE 0.4 MG/1
0.4 CAPSULE ORAL
Qty: 30 CAPSULE | Refills: 3 | Status: SHIPPED | OUTPATIENT
Start: 2023-10-11

## 2023-10-11 RX ORDER — LIDOCAINE HYDROCHLORIDE 10 MG/ML
INJECTION, SOLUTION EPIDURAL; INFILTRATION; INTRACAUDAL; PERINEURAL AS NEEDED
Status: DISCONTINUED | OUTPATIENT
Start: 2023-10-11 | End: 2023-10-11

## 2023-10-11 RX ORDER — KETAMINE HCL IN NACL, ISO-OSM 100MG/10ML
SYRINGE (ML) INJECTION AS NEEDED
Status: DISCONTINUED | OUTPATIENT
Start: 2023-10-11 | End: 2023-10-11

## 2023-10-11 RX ORDER — MAGNESIUM HYDROXIDE 1200 MG/15ML
LIQUID ORAL AS NEEDED
Status: DISCONTINUED | OUTPATIENT
Start: 2023-10-11 | End: 2023-10-11 | Stop reason: HOSPADM

## 2023-10-11 RX ORDER — FENTANYL CITRATE 50 UG/ML
INJECTION, SOLUTION INTRAMUSCULAR; INTRAVENOUS AS NEEDED
Status: DISCONTINUED | OUTPATIENT
Start: 2023-10-11 | End: 2023-10-11

## 2023-10-11 RX ORDER — DOCUSATE SODIUM 100 MG/1
100 CAPSULE, LIQUID FILLED ORAL 2 TIMES DAILY
Qty: 30 CAPSULE | Refills: 0 | Status: SHIPPED | OUTPATIENT
Start: 2023-10-11 | End: 2023-10-26

## 2023-10-11 RX ORDER — PHENAZOPYRIDINE HYDROCHLORIDE 200 MG/1
200 TABLET, FILM COATED ORAL 3 TIMES DAILY PRN
Qty: 10 TABLET | Refills: 0 | Status: SHIPPED | OUTPATIENT
Start: 2023-10-11 | End: 2023-10-14

## 2023-10-11 RX ORDER — DEXAMETHASONE SODIUM PHOSPHATE 10 MG/ML
INJECTION, SOLUTION INTRAMUSCULAR; INTRAVENOUS AS NEEDED
Status: DISCONTINUED | OUTPATIENT
Start: 2023-10-11 | End: 2023-10-11

## 2023-10-11 RX ORDER — FENTANYL CITRATE/PF 50 MCG/ML
25 SYRINGE (ML) INJECTION
Status: DISCONTINUED | OUTPATIENT
Start: 2023-10-11 | End: 2023-10-11 | Stop reason: HOSPADM

## 2023-10-11 RX ORDER — ACETAMINOPHEN 325 MG/1
650 TABLET ORAL EVERY 4 HOURS PRN
Qty: 30 TABLET | Refills: 0
Start: 2023-10-11

## 2023-10-11 RX ORDER — ONDANSETRON 2 MG/ML
INJECTION INTRAMUSCULAR; INTRAVENOUS AS NEEDED
Status: DISCONTINUED | OUTPATIENT
Start: 2023-10-11 | End: 2023-10-11

## 2023-10-11 RX ORDER — OXYCODONE HYDROCHLORIDE 5 MG/1
5 TABLET ORAL EVERY 4 HOURS PRN
Status: DISCONTINUED | OUTPATIENT
Start: 2023-10-11 | End: 2023-10-11 | Stop reason: HOSPADM

## 2023-10-11 RX ORDER — OXYBUTYNIN CHLORIDE 5 MG/1
5 TABLET ORAL EVERY 6 HOURS PRN
Qty: 30 TABLET | Refills: 0 | Status: SHIPPED | OUTPATIENT
Start: 2023-10-11

## 2023-10-11 RX ORDER — SODIUM CHLORIDE, SODIUM LACTATE, POTASSIUM CHLORIDE, CALCIUM CHLORIDE 600; 310; 30; 20 MG/100ML; MG/100ML; MG/100ML; MG/100ML
125 INJECTION, SOLUTION INTRAVENOUS CONTINUOUS
Status: DISCONTINUED | OUTPATIENT
Start: 2023-10-11 | End: 2023-10-11 | Stop reason: HOSPADM

## 2023-10-11 RX ORDER — LIDOCAINE HYDROCHLORIDE 10 MG/ML
0.5 INJECTION, SOLUTION EPIDURAL; INFILTRATION; INTRACAUDAL; PERINEURAL ONCE AS NEEDED
Status: DISCONTINUED | OUTPATIENT
Start: 2023-10-11 | End: 2023-10-11 | Stop reason: HOSPADM

## 2023-10-11 RX ORDER — OXYCODONE HYDROCHLORIDE 5 MG/1
5 TABLET ORAL EVERY 4 HOURS PRN
Qty: 5 TABLET | Refills: 0 | Status: SHIPPED | OUTPATIENT
Start: 2023-10-11 | End: 2023-10-16

## 2023-10-11 RX ADMIN — FENTANYL CITRATE 25 MCG: 50 INJECTION INTRAMUSCULAR; INTRAVENOUS at 15:34

## 2023-10-11 RX ADMIN — FENTANYL CITRATE 25 MCG: 50 INJECTION INTRAMUSCULAR; INTRAVENOUS at 15:41

## 2023-10-11 RX ADMIN — CIPROFLOXACIN: 2 INJECTION, SOLUTION INTRAVENOUS at 14:27

## 2023-10-11 RX ADMIN — FENTANYL CITRATE 25 MCG: 50 INJECTION INTRAMUSCULAR; INTRAVENOUS at 15:37

## 2023-10-11 RX ADMIN — PROPOFOL 100 MG: 10 INJECTION, EMULSION INTRAVENOUS at 14:25

## 2023-10-11 RX ADMIN — FENTANYL CITRATE 25 MCG: 50 INJECTION INTRAMUSCULAR; INTRAVENOUS at 14:53

## 2023-10-11 RX ADMIN — LIDOCAINE HYDROCHLORIDE 50 MG: 10 INJECTION, SOLUTION EPIDURAL; INFILTRATION; INTRACAUDAL; PERINEURAL at 14:25

## 2023-10-11 RX ADMIN — Medication 20 MG: at 14:57

## 2023-10-11 RX ADMIN — PROPOFOL 100 MG: 10 INJECTION, EMULSION INTRAVENOUS at 14:26

## 2023-10-11 RX ADMIN — DEXAMETHASONE SODIUM PHOSPHATE 10 MG: 10 INJECTION, SOLUTION INTRAMUSCULAR; INTRAVENOUS at 14:33

## 2023-10-11 RX ADMIN — SODIUM CHLORIDE, SODIUM LACTATE, POTASSIUM CHLORIDE, AND CALCIUM CHLORIDE: .6; .31; .03; .02 INJECTION, SOLUTION INTRAVENOUS at 14:10

## 2023-10-11 RX ADMIN — ONDANSETRON 4 MG: 2 INJECTION INTRAMUSCULAR; INTRAVENOUS at 15:03

## 2023-10-11 RX ADMIN — FENTANYL CITRATE 50 MCG: 50 INJECTION INTRAMUSCULAR; INTRAVENOUS at 14:38

## 2023-10-11 RX ADMIN — FENTANYL CITRATE 25 MCG: 50 INJECTION INTRAMUSCULAR; INTRAVENOUS at 14:25

## 2023-10-11 NOTE — ANESTHESIA POSTPROCEDURE EVALUATION
Post-Op Assessment Note    CV Status:  Stable  Pain Score: 0    Pain management: adequate     Mental Status:  Alert and awake   Hydration Status:  Euvolemic   PONV Controlled:  Controlled   Airway Patency:  Patent      Post Op Vitals Reviewed: Yes      Staff: CRNA         No notable events documented.     BP   169/76   Temp      Pulse 109   Resp   22   SpO2   99

## 2023-10-11 NOTE — INTERVAL H&P NOTE
H&P reviewed. After examining the patient I find no changes in the patients condition since the H&P had been written.     Vitals:    10/11/23 1153   BP: 105/58   Pulse: (!) 111   Resp: 16   Temp: 98.2 °F (36.8 °C)   SpO2: 99%

## 2023-10-11 NOTE — DISCHARGE INSTR - AVS FIRST PAGE
[facility]:    Your surgery went well. Your left ureteral stent was removed    I carefully evaluated your right ureter with a ureteroscope. A dense calcified stricture remained present and I did not feel that this was amenable to any endoscopic treatments. As such I exchanged your ureteral stent which was seem to be working well to keep your kidney draining and functioning well. Now my recommendation is to maintain the right ureteral stent and I will plan an exchange for you in approximately 4 months time. Please take your medications as prescribed with caution for comfort. Most importantly please drink 6-8 glasses of water per day    Please call with any questions or concerns. Cory Underwood MD,PhD  [facility]  [phone]            WHAT IS A STENT? At the end of the procedure, your doctor may place a stent into your ureter. A stent is a thin, flexible piece of plastic that will hold open your ureter while the remaining small pieces of stone pass. This allows your kidney to drain easily and prevents you from having to “pass” these small stone pieces on your own, which could be painful. The stent is about 12 inches long and looks and feels like a thin piece of spaghetti. AFTER THE PROCEDURE  After the procedure you may experience the following symptoms. All of these are normal and should resolve within 1 or 2 days after your stent is removed. Urinary frequency (urinating more often than usual)  Urinary urgency (the sensation that you need to urinate right away)  Painful urination (this can be pain in your bladder or in your back when  you urinate)  Blood in your urine ( a stent can irritate the lining of your bladder causing it to bleed)  Back/Flank pain, especially with urination  You will receive a prescription for narcotic pain medication after the procedure.  You will also receive a prescription for tamsulosin which you will take once a day for 2 weeks to help relax your ureter and decrease stent discomfort. You will also need to purchase a stool softener (i.e. Colace) or mild laxative (i.e. Miralax) as the narcotic pain medication can make you constipated. This is important as constipation can exacerbate stent related symptoms.

## 2023-10-11 NOTE — OP NOTE
Operative Note     PATIENT:  Joelle Mcclure (MRN [id number])    DATE OF PROCEDURE:   10/11/2023    PRE-OP DIAGNOSIS:   1) history of complex bilateral ureteral and intrarenal calculi  2. Known right ureteral stricture (proximal),  3.  Bilateral indwelling ureteral stents  4. Advanced colorectal cancer status post recent surgical resection and prior pelvic radiation    POST-OP DIAGNOSIS:   1) history of complex bilateral ureteral and intrarenal calculi  2. Known right ureteral stricture (proximal),  3.  Bilateral indwelling ureteral stents  4. Advanced colorectal cancer status post recent surgical resection and prior pelvic radiation    PROCEDURES PERFORMED:  1) Cystoscopy  2) Right retrograde pyelography with fluoroscopic interpretation  3) Right ureteroscopy (diagnostic)  4) Right ureteral stent exchange  #5 left ureteral stent removal    SURGEON:  Gricelda Belcher MD    NOTE:  There were no qualified teaching residents to assist with this case    ANESTHESIA: General/LMA     COMPLICATIONS:   None    ANTIBIOTICS:   Cipro    INTRAOPERATIVE THROMBOEMBOLISM PROPHYLAXIS:  Pneumatic compression stockings     FINDINGS:  -The left ureteral stent was removed at the beginning of the case  - Diagnostic right retrograde pyelogram again confirms a site of stricture disease in the proximal ureter. It measures approximately 2 to 3 cm  - Diagnostic ureteroscopy revealed significant stenosis in this region. Interestingly no intraluminal stones are present. As such the calcification seen on her recent CT can best be described as a calcified proximal ureteral stricture  - I did not feel that this stricture was amenable to any further endoscopic maneuvers and as such elected to exchange the ureteral stent for continued palliation and drainage of the kidney      PROCEDURE IN DETAIL:     PROCEDURE IN DETAIL:   The patient was identified and brought to the OR. Antibiotic prophylaxis and DVT prophylaxis were administered. They were placed in the comfortable dorsal lithotomy position with care to pad all pressure points. They were prepped and draped in the usual sterile fashion using hibiclens. A surgical time out was performed with all in the room in agreement with the correct patient, procedure, indications, and laterality. A 21-Cypriot rigid cystoscope was used to enter the bladder. The bladder was inspected in its entirety and there were no lesions noted. The ureteral orifices were identified in their normal orthotopic positions. The left ureteral stent was removed at the beginning of the case. The right ureteral orifice was identified and a 5 Fr open ended catheter was placed into the ureteral orifice alongside the preplaced ureteral stent which was then removed. A retrograde pyelogram was performed with the findings as described above. A Sensor wire was advanced up to the kidney under fluoroscopic guidance. Leaving this safety wire in place, the bladder was drained. A second working wire was then placed, and over this a 12/14 ureteral access sheath was sequentially passed under fluoroscopic guidance. A  5.3 Cypriot flexible ureteroscope was advanced up the ureter under vision . I took radiographic and endoscopic measurements and characterization of the stricture as described above. Again no intraluminal stones were present. Based upon this constellation of findings I did not feel that a laser incision or balloon dilation of a proximal calcified stricture in close proximity to the UPJ was appropriate. From a reconstructive standpoint the patient would require a transabdominal procedure with a reconstructive urologist.  For now elected to place a ureteral stent for continued palliation. The ureteroscope was backed down the ureter under vision and there were no residual fragments and the ureter was noted to be intact with no injury.    A JJ stent was then passed up the wire  under fluoroscopic guidance into the kidney with a good curl noted in the kidney and in the bladder. . The bladder was drained. The patient was placed back supine, awakened from general anesthesia and brought to recovery room in stable condition. ESTIMATED BLOOD LOSS:  Minimal      SPECIMENS:   No specimens collected during this procedure. IMPLANTS:   Implant Name Type Inv. Item Serial No.  Lot No. LRB No. Used Action   STENT URETERAL 7FR 2 [facility] - [id number]  STENT URETERAL 7FR 24CM [facility] [id number] Right 1 Explanted   STENT URETERAL 6FR 24C[employer] Ada Lanius  STENT URETERAL 6FR 24CML [facility] [id number] Left 1 Explanted   STENT URETERAL 7FR 102 [facility] [address] [place] [address] [id number]  STENT URETERAL 7FR 24CM [facility] [id number] Right 1 Implanted        COMPLICATIONS: None    DISPOSITION: PACU    PLAN:  Findings at the time of surgery and plan of care reviewed with the patient's . I will plan a ureteral stent exchange in 4 months time. He did inquire about to a reconstructive urologist.  I advised waiting until her planned colostomy reversal is complete.   I will plan to maintain her ureteral stent to that time

## 2023-10-12 ENCOUNTER — PREP FOR PROCEDURE (OUTPATIENT)
Dept: UROLOGY | Facility: CLINIC | Age: 58
End: 2023-10-12

## 2023-10-12 ENCOUNTER — TELEPHONE (OUTPATIENT)
Dept: UROLOGY | Facility: CLINIC | Age: 58
End: 2023-10-12

## 2023-10-12 DIAGNOSIS — N13.5 URETERAL STRICTURE, RIGHT: Primary | ICD-10-CM

## 2023-10-12 NOTE — TELEPHONE ENCOUNTER
Post Op Note    Tonya Ibarra is a 56 y.o. female s/p CYSTOSCOPY URETEROSCOPY RETROGRADE PYELOGRAM AND INSERTION STENT URETERAL [facility] RIGHT URETEROSCOPY, REMOVAL STENT LEFT SIDE (Bilateral: Bladder)  performed 10/11/23. Tonya Ibarra is a patient of Dr. Dr. Manolo Frausto and is seen at the [facility] office. How would you rate your pain on a scale from 1 to 10, 10 being the worst pain ever? 3 minimal discomfort. Went over all medications   Have you had a fever? No  Have your bowel movements been regular? No will be using colace   Do you have any difficulty urinating?  No    Do you have any other questions or concerns that I can address at this time?   -Went over potential/expected post op symptoms   -discussed ER precautions  -Confirmed post plan  -Went over medication   -added to stent list

## 2023-10-12 NOTE — TELEPHONE ENCOUNTER
PLAN:  Findings at the time of surgery and plan of care reviewed with the patient's . I will plan a ureteral stent exchange in 4 months time. He did inquire about to a reconstructive urologist.  I advised waiting until her planned colostomy reversal is complete.   I will plan to maintain her ureteral stent to that time

## 2023-10-31 ENCOUNTER — TELEPHONE (OUTPATIENT)
Dept: GASTROENTEROLOGY | Facility: CLINIC | Age: 58
End: 2023-10-31

## 2023-10-31 NOTE — TELEPHONE ENCOUNTER
Patients GI provider:  Dr. Makenzie Marcial    Number to return call: [phone]    Reason for call: Pt spouse called stating pt is scheduled for a procedure tomorrow that they know nothing about. Pt spouse very confused and is requesting a call back.  [facility]    Scheduled procedure/appointment date if applicable: procedure 80/0

## 2023-10-31 NOTE — TELEPHONE ENCOUNTER
Reviewed prep. Okay to continue to have procedure tomorrow. Have received all needed information from the facility this morning.

## 2023-11-06 ENCOUNTER — HOSPITAL ENCOUNTER (OUTPATIENT)
Dept: INFUSION CENTER | Facility: CLINIC | Age: 58
Discharge: HOME | End: 2023-11-06

## 2023-11-07 ENCOUNTER — HOSPITAL ENCOUNTER (OUTPATIENT)
Dept: INFUSION CENTER | Facility: CLINIC | Age: 58
Discharge: HOME | End: 2023-11-07
Payer: COMMERCIAL

## 2023-11-07 DIAGNOSIS — C20 RECTAL CANCER (HCC): ICD-10-CM

## 2023-11-07 DIAGNOSIS — D86.89 HEPATIC GRANULOMA ASSOCIATED WITH SARCOIDOSIS: ICD-10-CM

## 2023-11-07 DIAGNOSIS — Z95.828 PORT-A-CATH IN PLACE: Primary | ICD-10-CM

## 2023-11-07 LAB
ALBUMIN SERPL BCP-MCNC: 4 G/DL (ref 3.5–5)
ALP SERPL-CCNC: 356 U/L (ref 34–104)
ALT SERPL W P-5'-P-CCNC: 52 U/L (ref 7–52)
ANION GAP SERPL CALCULATED.3IONS-SCNC: 9 MMOL/L
AST SERPL W P-5'-P-CCNC: 31 U/L (ref 13–39)
BASOPHILS # BLD MANUAL: 0 THOUSAND/UL (ref 0–0.1)
BASOPHILS NFR MAR MANUAL: 0 % (ref 0–1)
BILIRUB SERPL-MCNC: 0.64 MG/DL (ref 0.2–1)
BUN SERPL-MCNC: 22 MG/DL (ref 5–25)
CALCIUM SERPL-MCNC: 9.8 MG/DL (ref 8.4–10.2)
CEA SERPL-MCNC: 3.7 NG/ML (ref 0–3)
CHLORIDE SERPL-SCNC: 106 MMOL/L (ref 96–108)
CO2 SERPL-SCNC: 23 MMOL/L (ref 21–32)
CREAT SERPL-MCNC: 0.75 MG/DL (ref 0.6–1.3)
CRP SERPL QL: 12.3 MG/L
EOSINOPHIL # BLD MANUAL: 0 THOUSAND/UL (ref 0–0.4)
EOSINOPHIL NFR BLD MANUAL: 0 % (ref 0–6)
ERYTHROCYTE [DISTWIDTH] IN BLOOD BY AUTOMATED COUNT: 14.2 % (ref 11.6–15.1)
ERYTHROCYTE [SEDIMENTATION RATE] IN BLOOD: 74 MM/HOUR (ref 0–29)
GFR SERPL CREATININE-BSD FRML MDRD: 90 ML/MIN/1.73SQ M
GLUCOSE SERPL-MCNC: 145 MG/DL (ref 65–140)
HCT VFR BLD AUTO: 32.2 % (ref 34.8–46.1)
HGB BLD-MCNC: 9.9 G/DL (ref 11.5–15.4)
INR PPP: 0.93 (ref 0.84–1.19)
LYMPHOCYTES # BLD AUTO: 0.22 THOUSAND/UL (ref 0.6–4.47)
LYMPHOCYTES # BLD AUTO: 3 % (ref 14–44)
MCH RBC QN AUTO: 25.9 PG (ref 26.8–34.3)
MCHC RBC AUTO-ENTMCNC: 30.7 G/DL (ref 31.4–37.4)
MCV RBC AUTO: 84 FL (ref 82–98)
MONOCYTES # BLD AUTO: 0.22 THOUSAND/UL (ref 0–1.22)
MONOCYTES NFR BLD: 3 % (ref 4–12)
MYELOCYTES NFR BLD MANUAL: 1 % (ref 0–1)
NEUTROPHILS # BLD MANUAL: 6.83 THOUSAND/UL (ref 1.85–7.62)
NEUTS BAND NFR BLD MANUAL: 3 % (ref 0–8)
NEUTS SEG NFR BLD AUTO: 90 % (ref 43–75)
PLATELET # BLD AUTO: 249 THOUSANDS/UL (ref 149–390)
PLATELET BLD QL SMEAR: ADEQUATE
PMV BLD AUTO: 9.5 FL (ref 8.9–12.7)
POTASSIUM SERPL-SCNC: 3.9 MMOL/L (ref 3.5–5.3)
PROT SERPL-MCNC: 7 G/DL (ref 6.4–8.4)
PROTHROMBIN TIME: 13 SECONDS (ref 11.6–14.5)
RBC # BLD AUTO: 3.82 MILLION/UL (ref 3.81–5.12)
RBC MORPH BLD: NORMAL
SODIUM SERPL-SCNC: 138 MMOL/L (ref 135–147)
WBC # BLD AUTO: 7.34 THOUSAND/UL (ref 4.31–10.16)

## 2023-11-07 PROCEDURE — 82378 CARCINOEMBRYONIC ANTIGEN: CPT

## 2023-11-07 PROCEDURE — 85027 COMPLETE CBC AUTOMATED: CPT

## 2023-11-07 PROCEDURE — 80053 COMPREHEN METABOLIC PANEL: CPT

## 2023-11-07 PROCEDURE — 85007 BL SMEAR W/DIFF WBC COUNT: CPT

## 2023-11-07 PROCEDURE — 82164 ANGIOTENSIN I ENZYME TEST: CPT

## 2023-11-07 PROCEDURE — 86140 C-REACTIVE PROTEIN: CPT

## 2023-11-07 PROCEDURE — 85652 RBC SED RATE AUTOMATED: CPT

## 2023-11-07 PROCEDURE — 85610 PROTHROMBIN TIME: CPT

## 2023-11-07 NOTE — PROGRESS NOTES
Pt. Resting Comfortably. Port accessed, labs drawn, port saline-locked and deaccessed without issue. Pt tolerated procedure well. Aware of next appt. AVS declined.

## 2023-11-08 LAB — ACE SERPL-CCNC: 76 U/L (ref 14–82)

## 2023-11-11 PROBLEM — N39.0 ACUTE UTI: Status: RESOLVED | Noted: 2023-09-12 | Resolved: 2023-11-11

## 2023-11-13 ENCOUNTER — OFFICE VISIT (OUTPATIENT)
Dept: PALLIATIVE MEDICINE | Facility: CLINIC | Age: 58
End: 2023-11-13
Payer: COMMERCIAL

## 2023-11-13 VITALS
BODY MASS INDEX: 27.51 KG/M2 | OXYGEN SATURATION: 98 % | HEART RATE: 99 BPM | WEIGHT: 149.5 LBS | SYSTOLIC BLOOD PRESSURE: 128 MMHG | HEIGHT: 62 IN | TEMPERATURE: 96.7 F | DIASTOLIC BLOOD PRESSURE: 72 MMHG

## 2023-11-13 DIAGNOSIS — M79.2 NEURALGIA INVOLVING SCALP: ICD-10-CM

## 2023-11-13 DIAGNOSIS — G89.3 CANCER RELATED PAIN: ICD-10-CM

## 2023-11-13 DIAGNOSIS — D86.9 SARCOIDOSIS: ICD-10-CM

## 2023-11-13 DIAGNOSIS — Z51.5 PALLIATIVE CARE PATIENT: ICD-10-CM

## 2023-11-13 DIAGNOSIS — C18.7 MALIGNANT NEOPLASM OF SIGMOID COLON (HCC): Primary | ICD-10-CM

## 2023-11-13 DIAGNOSIS — K59.03 DRUG-INDUCED CONSTIPATION: ICD-10-CM

## 2023-11-13 DIAGNOSIS — R51.9 SCALP PAIN: ICD-10-CM

## 2023-11-13 DIAGNOSIS — B02.9 SHINGLES: ICD-10-CM

## 2023-11-13 PROCEDURE — 99213 OFFICE O/P EST LOW 20 MIN: CPT | Performed by: INTERNAL MEDICINE

## 2023-11-13 RX ORDER — ACETAMINOPHEN 500 MG
1000 TABLET ORAL 3 TIMES DAILY
Qty: 180 TABLET | Refills: 11 | Status: SHIPPED | OUTPATIENT
Start: 2023-11-13

## 2023-11-13 NOTE — PATIENT INSTRUCTIONS
It was good to see you today. Thank you for coming in. Call Dr Kalpesh Singh of Urology ASAP re: blood in urine and pain w/ urination. Take the oxybutynin as needed for bladder spasms or discomfort. Removed some duplicate medications, and medications you have stopped, from your list.  Take the potassium (KCl) as directed - four 10mg capsules (total 40mg) twice per day. Discuss this medication with your primary care doctor. Follow Dr Dot Steiner instructions on the prednisone. Continue other medications. Return in about 6 to 8 weeks. Call us for refills on medications that we supply, as needed. If something changes and you need to come in sooner, please call our office. PRESCRIPTION REFILL REMINDER:  All medication refills should be requested prior to [facility] on Friday. Any refill requests after noon on Friday would be addressed the following Monday. MEDICATION SAFETY ISSUES:  Do not drive under the influence of narcotics (including opioids), watch for adverse effects including confusion / altered mental status / respiratory depression (slowed breathing), keep medications stored in a safe/locked environment, do not use alcohol while opioids or other narcotics are in your system.

## 2023-11-13 NOTE — PROGRESS NOTES
Follow-up with Palliative and Supportive Care  Rocky Hoff 56 y.o. female [id number]    ASSESSMENT & PLAN:  1. Malignant neoplasm of sigmoid colon ([address])    2. Sarcoidosis    3. Shingles    4. Cancer related pain    5. Neuralgia involving scalp    6. Scalp pain    7. Drug-induced constipation    8. Palliative care patient          Continue disease-directed cares. Patient endorses sequela of Zoster including altered sensation (pruritus) and occasional discomfort. Counseled at length on the nature of Zoster. She has discontinued gabapentin and amitriptyline as she feels she does not need those medications. Counseled. May use oxyIR PRN cancer-related or other moderate/severe pain. Encouraged patient to resume oxybutynin PRN; she has not been taking. Recommended she call Urology ASAP for dysuria, hematuria. Continue Tylenol for adjunct analgesia. May use Zofran PRN N/V. No recent N/V. ACP: Patient has not completed any advanced healthcare directives. She was provided a copy of the [facility] Advanced Directive along with counseling in a prior visit. She had expressed a desire to have her ex- Mackenzie Fagan be listed as a surrogate healthcare decision-maker. She has not been ready to complete a written advanced directive. Reviewed notes (Colorectal Surgery, Urology), labs (11/7/23 Cr 0.75, , alb 4.0, ACE 76, CEA 3.7, Hb 9.9, CRP 12.3), imaging + procedures (10/11/23 cystoscopy+ureteroscopy+pyelogram, 11/1/23 sigmoidoscopy). Return in about 6 weeks (around 12/25/2023). Emotional support provided. Medication safety issues addressed - no driving under the influence of narcotics (including opioids), watch for adverse effects including AMS or respiratory depression (slowed breathing), keep medications stored in a safe/locked environment, do not use alcohol while opioids or other narcotics are in one's system.       Requested Prescriptions     Signed Prescriptions Disp Refills    acetaminophen (TYLENOL) 500 mg tablet 180 tablet 11     Sig: Take 2 tablets (1,000 mg total) by mouth 3 (three) times a day       Medications Discontinued During This Encounter   Medication Reason    oxybutynin (DITROPAN) 5 mg tablet Duplicate order    acetaminophen (TYLENOL) 942 mg tablet Duplicate order    acetaminophen (TYLENOL) 500 mg tablet Reorder    amitriptyline (ELAVIL) 10 mg tablet Therapy completed    gabapentin (Neurontin) 300 mg capsule Therapy completed    diclofenac sodium (VOLTAREN) 50 mg EC tablet Therapy completed    docusate sodium (COLACE) 911 mg capsule Duplicate order       Representatives have queried the patient's controlled substance dispensing history in the Prescription Drug Monitoring Program in compliance with regulations before I have prescribed any controlled substances. The prescription history is consistent with prescribed therapy and our practice policies. 25 minutes were spent in this ambulatory visit with greater than 50% of the time spent face to face with patient and her ex- Lennox Martinez in counseling or coordination of care including symptom assessment and management, medication review, medication adjustment, psychosocial support, chart review, imaging review, lab review, supportive listening, and anticipatory guidance. All of the patient's questions were answered during this discussion. SUBJECTIVE:  Chief Complaint   Patient presents with    Follow-up    Cancer    Pain    Counseling    Zoster w/ associated pain        HPI    Diana Mcclellan is a 56 y.o. 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  She follows w/ Dr Valencia Sims (Radiation Oncology), Dr Sania Olivo (Gastroenterology), Dr Radha Kearney (Medical Oncology), Dr Tammy Lofton (Urology), Dr Valerie Alpers (Colorectal Surgery). Patient is known to [facility] clinic; seen 10/9/23 for symptom assessment and management, medication review, medication adjustment, psychosocial support, chart review, imaging review, lab review, advanced directives, goals of care, supportive listening and anticipatory guidance. Patient initially states that she hasn't been experiencing much Zoster-flare-related pain recently, though with some discussion she reports some altered sensations in her scalp area. She had discontinued her gabapentin and amitriptyline as she felt she no longer needed those medicines any longer. She is occasionally using oxycodone for severe pain, as she notes a resurgence in groin/urinary pain, dysuria, and also notes hematuria. She had discontinued her oxybutynin as well, but accepts counseling that this medication may be beneficial for some of her symptoms. She is primarily using Tylenol for analgesia at this point. Pain is 2/10 at time of visit today. Patient denies overt anxiousness, denies overt dysphoric mood, but demonstrates signs of frustration and anxiousness regarding her health issues. She denies recent nausea, denies recent vomiting. She states her bowel function is "okay" with soft stools being produced regularly. She notes an occasional "pinch" at the site of her stoma, which she states is a sign of healing. PDMP shows no concerns. Review of Systems   All other systems reviewed and are negative. The following portions of the medical history were reviewed: past medical history, surgical history, problem list, medication list, family history, and social history.       Current Outpatient Medications:     acetaminophen (TYLENOL) 500 mg tablet, Take 2 tablets (1,000 mg total) by mouth 3 (three) times a day, Disp: 180 tablet, Rfl: 11    amLODIPine (NORVASC) 10 mg tablet, Take 1 tablet (10 mg total) by mouth daily (Patient taking differently: Take 20 mg by mouth daily), Disp: 90 tablet, Rfl: 3    cyanocobalamin (VITAMIN B-12) 1000 MCG tablet, Take 1,000 mcg by mouth daily, Disp: , Rfl:     oxyCODONE (Roxicodone) 5 immediate release tablet, Take 1 tablet (5 mg total) by mouth every 4 (four) hours as needed for moderate pain or severe pain Max Daily Amount: 30 mg, Disp: 180 tablet, Rfl: 0    predniSONE 10 mg tablet, Take 1 tablet (10 mg total) by mouth daily Please take for 4 weeks and then decrease by 5 mg increments every 2 weeks. -- Taking 20mg and then 10mg - titrating, Disp: 30 tablet, Rfl: 2    ursodiol (ACTIGALL) 300 mg capsule, Take 3 capsules (900 mg total) by mouth in the morning, Disp: 90 capsule, Rfl: 11    docusate sodium (COLACE) 100 mg capsule, Take 1 capsule (100 mg total) by mouth 2 (two) times a day - hold if you are experiencing diarrhea (Patient not taking: Reported on 11/13/2023), Disp: 60 capsule, Rfl: 2    ondansetron (ZOFRAN) 4 mg tablet, Take 1 tablet (4 mg total) by mouth every 8 (eight) hours as needed for nausea or vomiting (Patient not taking: Reported on 9/29/2023), Disp: 20 tablet, Rfl: 0    oxybutynin (DITROPAN) 5 mg tablet, Take 1 tablet (5 mg total) by mouth every 6 (six) hours as needed (bladder spasms) (Patient not taking: Reported on 11/13/2023), Disp: 30 tablet, Rfl: 0    potassium chloride (MICRO-K) 10 MEQ CR capsule, Take 4 capsules (40 mEq total) by mouth 2 (two) times a day, Disp: 240 capsule, Rfl: 0    tamsulosin (FLOMAX) 0.4 mg, Take 1 capsule (0.4 mg total) by mouth daily with dinner (Patient not taking: Reported on 11/13/2023), Disp: 30 capsule, Rfl: 3    OBJECTIVE:  /72 (BP Location: Left arm, Patient Position: Sitting, Cuff Size: Standard)   Pulse 99   Temp (!) 96.7 °F (35.9 °C) (Temporal)   Ht 5' 2" (1.575 m)   Wt 67.8 kg (149 lb 8 oz)   SpO2 98%   BMI 27.34 kg/m²   Physical Exam  Vitals reviewed. Constitutional:       General: She is not in acute distress. Appearance: She is normal weight. She is not toxic-appearing.    HENT:      Head: Normocephalic and atraumatic. Right Ear: External ear normal.      Left Ear: External ear normal.   Eyes:      General: No scleral icterus. Right eye: No discharge. Left eye: No discharge. Extraocular Movements: Extraocular movements intact. Conjunctiva/sclera: Conjunctivae normal.      Pupils: Pupils are equal, round, and reactive to light. Cardiovascular:      Rate and Rhythm: Normal rate. Pulmonary:      Effort: Pulmonary effort is normal. No tachypnea, bradypnea, accessory muscle usage or respiratory distress. Comments: Able to speak comfortably in complete sentences on room air at rest.  Abdominal:      General: There is no distension. Tenderness: There is no guarding. Musculoskeletal:      Cervical back: Normal range of motion. Right lower leg: No edema. Left lower leg: No edema. Skin:     General: Skin is dry. Coloration: Skin is not pale. Neurological:      Mental Status: She is alert and oriented to person, place, and time. Cranial Nerves: No dysarthria or facial asymmetry. Comments: Using no assistive devices for ambulation. Psychiatric:         Attention and Perception: Attention normal.         Mood and Affect: Mood is anxious. Speech: Speech normal.         Behavior: Behavior normal. Behavior is cooperative. Thought Content: Thought content normal.         Cognition and Memory: Cognition and memory normal.          Shama Mathias MD  [facility]  [phone]    Portions of this document may have been created using dictation software and as such some "sound alike" terms may have been generated by the system. Do not hesitate to contact me with any questions or clarifications.

## 2023-11-17 ENCOUNTER — TELEPHONE (OUTPATIENT)
Dept: HEMATOLOGY ONCOLOGY | Facility: CLINIC | Age: 58
End: 2023-11-17

## 2023-11-17 NOTE — TELEPHONE ENCOUNTER
Informed that provider is not available on the original date of the appt, gave new appt date and time along with callback number, informed if there was any problems to call

## 2023-11-28 ENCOUNTER — OFFICE VISIT (OUTPATIENT)
Dept: DERMATOLOGY | Facility: CLINIC | Age: 58
End: 2023-11-28
Payer: COMMERCIAL

## 2023-11-28 VITALS — BODY MASS INDEX: 27.07 KG/M2 | TEMPERATURE: 98.7 F | WEIGHT: 148 LBS

## 2023-11-28 DIAGNOSIS — L72.0 EPIDERMAL CYST: ICD-10-CM

## 2023-11-28 PROCEDURE — 99244 OFF/OP CNSLTJ NEW/EST MOD 40: CPT | Performed by: DERMATOLOGY

## 2023-11-28 RX ORDER — CEPHALEXIN 500 MG/1
500 CAPSULE ORAL 2 TIMES DAILY
Qty: 20 CAPSULE | Refills: 0 | Status: SHIPPED | OUTPATIENT
Start: 2023-11-28 | End: 2023-12-08

## 2023-11-28 NOTE — PATIENT INSTRUCTIONS
Assessment and Plan:  Based on a thorough discussion of this condition and the management approach to it (including a comprehensive discussion of the known risks, side effects and potential benefits of treatment), the patient (family) agrees to implement the following specific plan:  Start keflex 500mg BID x 10days     What are epidermal inclusion cysts? Epidermal inclusion cysts are the most common, benign cutaneous cysts. There are many different names for epidermal inclusion cysts, including epidermoid cyst, epidermal cyst, infundibular cyst, inclusion cyst, and keratin cyst. These cysts can occur anywhere on the body and typically present as nodules directly underneath the skin. There is often a visible pore or opening in the center. The cysts are freely moveable and can range from a few millimeters to several centimeters in diameter. The center of epidermoid cysts almost always contains keratin, which has a cheesy appearance, and not sebum. They also do not originate from sebaceous glands. Therefore, epidermal inclusion cysts are not the same as sebaceous cysts. Cysts may remain stable or progressively enlarge over time. There are no reliable predictive factors to tell if an epidermal inclusion cyst will enlarge, become inflamed, or remain quiescent. Infected cysts tend to become larger, turn red, and are more noticeable to the patient. There may be accompanying pain and discomfort. What causes epidermal inclusion cysts? Epidermal inclusion cysts often appear out of the blue and are not contagious. They are due to a proliferation of epidermal cells within the dermis and are more common in men than women. They occur more frequently in patients in their 20s to 45s.  Epidermal inclusion cysts by themselves are usually not inherited, but they can be hereditary in rare syndromes such as Clark syndrome, nodular elastosis with cysts and comedones (Favre-Racouchot syndrome), and basal cell nevus syndrome (Gorlin syndrome). Elderly patients with chronic sun-damaged skin areas have a higher likelihood of developing epidermoid cysts. They often occur in areas where hair follicles have been inflamed or repeatedly irritated are more frequent in patients with acne vulgaris. In the  period, they are called milia. Patients on BRAF inhibitors such as imiquimod and cyclosporine have a higher incidence of epidermoid cysts of the face. How do we diagnose an epidermal inclusion cyst?  Epidermoid inclusion cysts are often diagnosed by history and physical exam. There is usually no need for biopsy prior to removal.  Radiographic and laboratory exams, such as ultrasound studies, are unnecessary and not typically ordered unless the practitioner suspects a genetic condition. What is the treatment for an epidermal inclusion cyst?  Inflamed, uninfected epidermal inclusion cysts rarely resolve spontaneously without therapy or surgical intervention. Treatment is not emergent unless desired by the patient. Definitive treatment is via surgical excision with walls intact. This method will prevent recurrence. This is best done when the cyst is not inflamed, to decrease the probability of rupture during surgery. A local anesthetic will be injected around the cyst  A small incision is made in the skin overlying the cyst, and contents are expressed  The incision is repaired with sutures    Another option is to use a 4mm punch biopsy with cyst extraction through the defect. Incision and drainage is often needed if the cyst is infected or inflamed. If there is surrounding cellulitis, oral antibiotic therapy may be necessary. The common agents used target methicillin sensitive Staphylococcal aureus and methicillin resistant S aureus in areas of high prevalence.

## 2023-11-28 NOTE — PROGRESS NOTES
[facility] Note     Patient Name: Jem Ctoo  Encounter Date: 11/28/23     Have you been [facility] by [facility] in the last 3 years and, if so, which description applies to you? NO. I am considered a "new" patient and must complete all patient intake questions. I am FEMALE/of child-bearing potential.    REVIEW OF SYSTEMS:  Have you recently had or currently have any of the following? Recent fever or chills? No  Any non-healing wound? No  Are you pregnant or planning to become pregnant? No  Are you currently or planning to be nursing or breast feeding? No   PAST MEDICAL HISTORY:  Have you personally ever had or currently have any of the following? If "YES," then please provide more detail. Skin cancer (such as Melanoma, Basal Cell Carcinoma, Squamous Cell Carcinoma? No  Tuberculosis, HIV/AIDS, Hepatitis B or C: No  Radiation Treatment No   HISTORY OF IMMUNOSUPPRESSION:   Do you have a history of any of the following:  Systemic Immunosuppression such as Diabetes, Biologic or Immunotherapy, Chemotherapy, Organ Transplantation, Bone Marrow Transplantation? No    Answering "YES" requires the addition of the dotphrase "IMMUNOSUPPRESSED" as the first diagnosis of the patient's visit. FAMILY HISTORY:  Any "first degree relatives" (parent, brother, sister, or child) with the following? Skin Cancer, Pancreatic or Other Cancer? No   PATIENT EXPERIENCE:    Do you want the Dermatologist to perform a COMPLETE skin exam today including a clinical examination under the "bra and underwear" areas? NO  If necessary, do we have your permission to call and leave a detailed message on your Preferred Phone number that includes your specific medical information? Yes      Allergies   Allergen Reactions    Oxaliplatin Shortness Of Breath     Reactions occurred with 2nd and 3rd infusions (about 1-3 hours from initiation of infusion) and required treatment with steroids and antihistamines. Please refer to allergy note on 2/7/2023 for detailed description of her reactions. Morphine Nausea Only     "Every dose made me nauseous" (oral dosing only, can tolerate IV morphine)      Current Outpatient Medications:     acetaminophen (TYLENOL) 500 mg tablet, Take 2 tablets (1,000 mg total) by mouth 3 (three) times a day, Disp: 180 tablet, Rfl: 11    amLODIPine (NORVASC) 10 mg tablet, Take 1 tablet (10 mg total) by mouth daily (Patient taking differently: Take 20 mg by mouth daily), Disp: 90 tablet, Rfl: 3    cyanocobalamin (VITAMIN B-12) 1000 MCG tablet, Take 1,000 mcg by mouth daily, Disp: , Rfl:     docusate sodium (COLACE) 100 mg capsule, Take 1 capsule (100 mg total) by mouth 2 (two) times a day - hold if you are experiencing diarrhea (Patient not taking: Reported on 11/13/2023), Disp: 60 capsule, Rfl: 2    ondansetron (ZOFRAN) 4 mg tablet, Take 1 tablet (4 mg total) by mouth every 8 (eight) hours as needed for nausea or vomiting (Patient not taking: Reported on 9/29/2023), Disp: 20 tablet, Rfl: 0    oxybutynin (DITROPAN) 5 mg tablet, Take 1 tablet (5 mg total) by mouth every 6 (six) hours as needed (bladder spasms) (Patient not taking: Reported on 11/13/2023), Disp: 30 tablet, Rfl: 0    oxyCODONE (Roxicodone) 5 immediate release tablet, Take 1 tablet (5 mg total) by mouth every 4 (four) hours as needed for moderate pain or severe pain Max Daily Amount: 30 mg, Disp: 180 tablet, Rfl: 0    potassium chloride (MICRO-K) 10 MEQ CR capsule, Take 4 capsules (40 mEq total) by mouth 2 (two) times a day, Disp: 240 capsule, Rfl: 0    predniSONE 10 mg tablet, Take 1 tablet (10 mg total) by mouth daily Please take for 4 weeks and then decrease by 5 mg increments every 2 weeks.  -- Taking 20mg and then 10mg - titrating, Disp: 30 tablet, Rfl: 2    tamsulosin (FLOMAX) 0.4 mg, Take 1 capsule (0.4 mg total) by mouth daily with dinner (Patient not taking: Reported on 11/13/2023), Disp: 30 capsule, Rfl: 3    ursodiol (ACTIGALL) 300 mg capsule, Take 3 capsules (900 mg total) by mouth in the morning, Disp: 90 capsule, Rfl: 11          Whom besides the patient is providing clinical information about today's encounter? NO ADDITIONAL HISTORIAN (patient alone provided history)    Physical Exam and Assessment/Plan by Diagnosis:      Inflamed CYST    Physical Exam:  Anatomic Location Affected:  Left breast 6 O'clock   Morphological Description:  Diffuse erythema with pus and pain  Reviewed mammogram on file from July 2023     Additional History of Present Condition:  Patient for lump on the left breast, present months. Noted area was removed in past. Charyl Alosa was done 07/26/23 dx sebaceous cyst. C/O redness, and pain, no drainage x 5 days; before that she states it was a skin colored bump.  From her description, sounds like it was drained once by another doctor     Assessment and Plan:  Based on a thorough discussion of this condition and the management approach to it (including a comprehensive discussion of the known risks, side effects and potential benefits of treatment), the patient (family) agrees to implement the following specific plan:  Start keflex 500mg BID x 10days   Plan excision after antibiotic finish as long as the cyst is no longer inflamed       Scribe Attestation      I,:  Lilly Stephenson am acting as a scribe while in the presence of the attending physician.:       I,:  Evin De Santiago MD personally performed the services described in this documentation    as scribed in my presence.:

## 2023-11-29 ENCOUNTER — CONSULT (OUTPATIENT)
Dept: NEPHROLOGY | Facility: CLINIC | Age: 58
End: 2023-11-29
Payer: COMMERCIAL

## 2023-11-29 VITALS
SYSTOLIC BLOOD PRESSURE: 124 MMHG | HEIGHT: 62 IN | DIASTOLIC BLOOD PRESSURE: 68 MMHG | BODY MASS INDEX: 27.42 KG/M2 | WEIGHT: 149 LBS | HEART RATE: 101 BPM

## 2023-11-29 DIAGNOSIS — R31.0 GROSS HEMATURIA: ICD-10-CM

## 2023-11-29 DIAGNOSIS — K56.600 PARTIAL SMALL BOWEL OBSTRUCTION (HCC): ICD-10-CM

## 2023-11-29 DIAGNOSIS — E87.6 HYPOKALEMIA: ICD-10-CM

## 2023-11-29 DIAGNOSIS — R30.0 DYSURIA: ICD-10-CM

## 2023-11-29 DIAGNOSIS — I10 PRIMARY HYPERTENSION: Primary | ICD-10-CM

## 2023-11-29 DIAGNOSIS — E27.8 ADRENAL NODULE (HCC): ICD-10-CM

## 2023-11-29 DIAGNOSIS — C20 RECTAL CANCER (HCC): ICD-10-CM

## 2023-11-29 PROCEDURE — 99213 OFFICE O/P EST LOW 20 MIN: CPT | Performed by: STUDENT IN AN ORGANIZED HEALTH CARE EDUCATION/TRAINING PROGRAM

## 2023-11-29 NOTE — PROGRESS NOTES
NEPHROLOGY OUTPATIENT CONSULTATION   Joelle Mcclure 66 y.o. female MRN: [id number]  Date: 12/5/2023  Reason for consultation: No chief complaint on file. ASSESSMENT AND PLAN:  66 y.o woman with PMH cT3 cN0 cM0 rectal adenocarcinoma in the [facility] s/p diverting ileostomy, started on neoadjuvant CapeOx, complicated with reaction on the second cycle, treated with Xeloda,5-FU with RT , renal stricture with bilateral stent , sarcoidosis, on steroids. Patient was admitted in August 0601 with SBO complicated with hypokalemia. Patient is here for initial consultation for hypokalemia     PLAN:    # Hypokalemia  Potassium 3.9 mg/L  On potassium supplements  Patient has adrenal nodule  We will repeat aldosterone/renin      #Volume status/hypertension:  Volume: Euvolemic on exam  Blood pressure: Normotensive, 124/68, goal less than 140/90  Recommend:  Amlodipine 10 mg  Advised to maintain a good BP control to prevent progression of CKD       # Rectal adenocarcinoma  Status post diverting ileostomy  5-FU with RT  Follow-up with oncology      # SBO  Admitted in August  Complicated with hypokalemia  Resolved    # Adrenal nodule  8 mm right adrenal nodule  Renin, cortisol, aldosterone as above  Blood pressure well-controlled    # Dysuria  Hematuria with no bacteria  No antibiotics at this time      HISTORY OF PRESENT ILLNESS:  Joelle Mcclure is a 66 y.o. female with medical issues of cT3 cN0 cM0 rectal adenocarcinoma in the [facility] s/p diverting ileostomy, started on neoadjuvant CapeOx, complicated with reaction on the second cycle, treated with Xeloda,5-FU with RT , renal stricture with bilateral stent , sarcoidosis, on steroids. Patient was admitted in August 5876 with SBO complicated with hypokalemia. Patient presents for initial consultation for hypokalemia    REVIEW OF SYSTEMS:    More than 10 point review of systems were obtained and discussed in length with the patient.  Complete review of systems were negative / unremarkable except mentioned in the HPI section. Review of Systems - Ophthalmic ROS: negative  ENT ROS: negative  Hematological and Lymphatic ROS: negative  Endocrine ROS: negative  Respiratory ROS: no cough, shortness of breath, or wheezing  Cardiovascular ROS: no chest pain or dyspnea on exertion  Gastrointestinal ROS: no abdominal pain, change in bowel habits, or black or bloody stools  Genito-Urinary ROS: positive for - dysuria  Musculoskeletal ROS: negative  Neurological ROS: no TIA or stroke symptoms  Dermatological ROS: negative     PHYSICAL EXAM:  Vitals:    11/29/23 1429   BP: 124/68   BP Location: Left arm   Patient Position: Sitting   Cuff Size: Standard   Pulse: 101   Weight: 67.6 kg (149 lb)   Height: 5' 2" (1.575 m)     Body mass index is 27.25 kg/m².     Physical Exam  General:  no acute distress at this time  Skin:  No acute rash  Eyes:  No scleral icterus and noninjected  ENT:  mucous membranes moist  Neck:  no carotid bruits  Chest:  Clear to auscultation percussion, good respiratory effort, no use of accessory respiratory muscles  CVS:  Regular rate and rhythm without rub   Abdomen:  soft and nontender   Extremities: no significant lower extremity edema  Neuro:  No gross focality  Psych:  Alert , cooperative       PAST MEDICAL HISTORY:  Past Medical History:   Diagnosis Date   • Cancer ([address])    • Colon cancer ([address])    • Fall    • Headache    • Hemochromatosis, unspecified    • History of transfusion     2022 - no adverse reaction   • Hypertension    • Kidney calculi    • Kidney stone    • Liver disease     hemangioma   • Muscle weakness    • Personal history of COVID-19 12/2022   • Sarcoidosis    • Seizures ([address])     2022   • Shingles        PAST SURGICAL HISTORY:  Past Surgical History:   Procedure Laterality Date   • ABSCESS DRAINAGE      left breast   • BREAST BIOPSY     • CHEST TUBE INSERTION     • COLON SURGERY      colostomy   • COLONOSCOPY     • FL GUIDED CENTRAL VENOUS ACCESS DEVICE INSERTION  03/21/2023   • FL RETROGRADE PYELOGRAM  01/23/2023   • FL RETROGRADE PYELOGRAM  04/03/2023   • FL RETROGRADE PYELOGRAM  7/21/2023   • FL RETROGRADE PYELOGRAM  10/11/2023   • IR BIOPSY LIVER MASS  05/09/2023   • LUNG BIOPSY     • OR CYSTO BLADDER W/URETERAL CATHETERIZATION Bilateral 07/21/2023    Procedure: CYSTOSCOPY URETEROSCOPY RETROGRADE PYELOGRAM WITH BILATERAL STENT URETERAL EXCHANGE; LEFT LASER LITHOTRIPSY AND STONE BASKET RETRIEVAL;  Surgeon: Kiah Damon MD;  [facility];  Service: Urology   • OR CYSTO/URETERO W/LITHOTRIPSY &INDWELL STENT INSRT Bilateral 01/23/2023    Procedure: CYSTOSCOPY  RIGHT URETEROSCOPY WITH LITHOTRIPSY HOLMIUM LASER, BILATERAL RETROGRADE PYELOGRAM AND BILATERAL  URETERAL STENT INSERTION;  Surgeon: Kiah Damon MD;  [facility];  Service: Urology   • OR CYSTO/URETERO W/LITHOTRIPSY &INDWELL STENT INSRT Right 04/03/2023    Procedure: RIGHT URETEROSCOPY WITH LITHOTRIPSY HOLMIUM LASER, RETROGRADE PYELOGRAM; BILATERAL EXCHANGE STENT URETERAL;  Surgeon: Kiah Damon MD;  [facility];  Service: Urology   • OR CYSTO/URETERO W/LITHOTRIPSY &INDWELL STENT INSRT Bilateral 10/11/2023    Procedure: CYSTOSCOPY URETEROSCOPY RETROGRADE PYELOGRAM AND INSERTION STENT URETERAL [facility] RIGHT URETEROSCOPY, REMOVAL STENT LEFT SIDE;  Surgeon: Kiah Damon MD;  [facility];  Service: Urology   • OR INSJ TUNNELED CTR VAD W/SUBQ PORT AGE 5 YR/> N/A 03/21/2023    Procedure: INSERTION VENOUS PORT ( PORT-A-CATH) IR;  Surgeon: Stevo Hernandez DO;  [facility];  Service: Interventional Radiology   • OR LAPS COLECTOMY PRTL W/COLOPXTSTMY LW ANAST N/A 8/17/2023    Procedure: RESECTION COLON LOW ANTERIOR LAPAROSCOPIC WITH ROBOTICS;  Surgeon: Alpesh Pond MD;  [facility];  Service: Colorectal   • TONSILLECTOMY     • URINARY SURGERY Bilateral     bilateral stents ALLERGIES:  Allergies   Allergen Reactions   • Oxaliplatin Shortness Of Breath     Reactions occurred with 2nd and 3rd infusions (about 1-3 hours from initiation of infusion) and required treatment with steroids and antihistamines. Please refer to allergy note on 2/7/2023 for detailed description of her reactions.    • Morphine Nausea Only     "Every dose made me nauseous" (oral dosing only, can tolerate IV morphine)       SOCIAL HISTORY:  Social History     Substance and Sexual Activity   Alcohol Use Never     Social History     Substance and Sexual Activity   Drug Use Never     Social History     Tobacco Use   Smoking Status Never   • Passive exposure: Past   Smokeless Tobacco Never       FAMILY HISTORY:  Family History   Problem Relation Age of Onset   • Cancer Mother    • Cirrhosis Father    • Breast cancer Neg Hx    • Breast cancer additional onset Neg Hx        MEDICATIONS:    Current Outpatient Medications:   •  acetaminophen (TYLENOL) 500 mg tablet, Take 2 tablets (1,000 mg total) by mouth 3 (three) times a day, Disp: 180 tablet, Rfl: 11  •  amLODIPine (NORVASC) 10 mg tablet, Take 1 tablet (10 mg total) by mouth daily (Patient taking differently: Take 20 mg by mouth daily), Disp: 90 tablet, Rfl: 3  •  cephalexin (KEFLEX) 500 mg capsule, Take 1 capsule (500 mg total) by mouth 2 (two) times a day for 10 days, Disp: 20 capsule, Rfl: 0  •  cyanocobalamin (VITAMIN B-12) 1000 MCG tablet, Take 1,000 mcg by mouth daily, Disp: , Rfl:   •  oxybutynin (DITROPAN) 5 mg tablet, Take 1 tablet (5 mg total) by mouth every 6 (six) hours as needed (bladder spasms), Disp: 30 tablet, Rfl: 0  •  oxyCODONE (Roxicodone) 5 immediate release tablet, Take 1 tablet (5 mg total) by mouth every 4 (four) hours as needed for moderate pain or severe pain Max Daily Amount: 30 mg, Disp: 180 tablet, Rfl: 0  •  predniSONE 10 mg tablet, Take 1 tablet (10 mg total) by mouth daily Please take for 4 weeks and then decrease by 5 mg increments every 2 weeks. -- Taking 20mg and then 10mg - titrating, Disp: 30 tablet, Rfl: 2  •  ursodiol (ACTIGALL) 300 mg capsule, Take 3 capsules (900 mg total) by mouth in the morning, Disp: 90 capsule, Rfl: 11  •  ondansetron (ZOFRAN) 4 mg tablet, Take 1 tablet (4 mg total) by mouth every 8 (eight) hours as needed for nausea or vomiting (Patient not taking: Reported on 9/29/2023), Disp: 20 tablet, Rfl: 0  •  potassium chloride (MICRO-K) 10 MEQ CR capsule, Take 4 capsules (40 mEq total) by mouth 2 (two) times a day, Disp: 240 capsule, Rfl: 0    Lab Results:   Results for orders placed or performed in visit on 11/29/23   Urinalysis with microscopic   Result Value Ref Range    Color, UA Yellow     Clarity, UA Turbid     Specific Gravity, UA 1.017 1.003 - 1.030    pH, UA 5.5 4.5, 5.0, 5.5, 6.0, 6.5, 7.0, 7.5, 8.0    Leukocytes, UA Moderate (A) Negative    Nitrite, UA Negative Negative    Protein, UA 50 (1+) (A) Negative mg/dl    Glucose, UA Negative Negative mg/dl    Ketones, UA Negative Negative mg/dl    Urobilinogen, UA <2.0 <2.0 mg/dl mg/dl    Bilirubin, UA Negative Negative    Occult Blood, UA Large (A) Negative    RBC, UA Innumerable (A) None Seen, 1-2 /hpf    WBC, UA Innumerable (A) None Seen, 1-2 /hpf    Epithelial Cells Occasional None Seen, Occasional /hpf    Bacteria, UA None Seen None Seen, Occasional /hpf    MUCUS THREADS Occasional (A) None Seen   Albumin / creatinine urine ratio   Result Value Ref Range    Creatinine, Ur 125.0 Reference range not established. mg/dL    Albumin,U,Random 193.5 (H) <20.0 mg/L    Albumin Creat Ratio 155 (H) 0 - 30 mg/g creatinine   Potassium, urine, random   Result Value Ref Range    Potassium Urine  92.6 Reference range not established. mmol/L   Sodium, urine, random   Result Value Ref Range    Sodium, Ur 21 Reference range not established.    Creatinine, urine, random   Result Value Ref Range    Creatinine, Ur 126.0 Reference range not established. mg/dL   Chloride, urine, random Result Value Ref Range    Chloride, Ur 83 Reference range not established. mmol/L   Catecholamines, fractionated, plasma   Result Value Ref Range    Norepinephrine Plasma 545 0 - 874 pg/mL    Epinephrine Plasma 18 0 - 62 pg/mL    Dopamine Plasma <30 0 - 48 pg/mL       Portions of the record may have been created with voice recognition software. Occasional wrong word or "sound a like" substitutions may have occurred due to the inherent limitations of voice recognition software. Read the chart carefully and recognize, using context, where substitutions have occurred.

## 2023-11-30 ENCOUNTER — APPOINTMENT (OUTPATIENT)
Dept: LAB | Facility: AMBULARY SURGERY CENTER | Age: 58
End: 2023-11-30
Payer: COMMERCIAL

## 2023-11-30 DIAGNOSIS — C20 RECTAL CANCER (HCC): ICD-10-CM

## 2023-11-30 DIAGNOSIS — E87.6 HYPOKALEMIA: ICD-10-CM

## 2023-11-30 DIAGNOSIS — E27.8 ADRENAL NODULE (HCC): ICD-10-CM

## 2023-11-30 LAB
BACTERIA UR QL AUTO: ABNORMAL /HPF
BILIRUB UR QL STRIP: NEGATIVE
CHLORIDE UR-SCNC: 83 MMOL/L
CLARITY UR: ABNORMAL
COLOR UR: YELLOW
CORTIS AM PEAK SERPL-MCNC: 3.1 UG/DL (ref 6.7–22.6)
CREAT UR-MCNC: 125 MG/DL
CREAT UR-MCNC: 126 MG/DL
GLUCOSE UR STRIP-MCNC: NEGATIVE MG/DL
HGB UR QL STRIP.AUTO: ABNORMAL
KETONES UR STRIP-MCNC: NEGATIVE MG/DL
LEUKOCYTE ESTERASE UR QL STRIP: ABNORMAL
MAGNESIUM SERPL-MCNC: 2.1 MG/DL (ref 1.9–2.7)
MICROALBUMIN UR-MCNC: 193.5 MG/L
MICROALBUMIN/CREAT 24H UR: 155 MG/G CREATININE (ref 0–30)
MUCOUS THREADS UR QL AUTO: ABNORMAL
NITRITE UR QL STRIP: NEGATIVE
NON-SQ EPI CELLS URNS QL MICRO: ABNORMAL /HPF
PH UR STRIP.AUTO: 5.5 [PH]
POTASSIUM UR-SCNC: 92.6 MMOL/L
PROT UR STRIP-MCNC: ABNORMAL MG/DL
RBC #/AREA URNS AUTO: ABNORMAL /HPF
SODIUM 24H UR-SCNC: 21 MOL/L
SP GR UR STRIP.AUTO: 1.02 (ref 1–1.03)
UROBILINOGEN UR STRIP-ACNC: <2 MG/DL
WBC #/AREA URNS AUTO: ABNORMAL /HPF

## 2023-11-30 PROCEDURE — 82570 ASSAY OF URINE CREATININE: CPT | Performed by: STUDENT IN AN ORGANIZED HEALTH CARE EDUCATION/TRAINING PROGRAM

## 2023-11-30 PROCEDURE — 84244 ASSAY OF RENIN: CPT

## 2023-11-30 PROCEDURE — 83735 ASSAY OF MAGNESIUM: CPT

## 2023-11-30 PROCEDURE — 81001 URINALYSIS AUTO W/SCOPE: CPT | Performed by: STUDENT IN AN ORGANIZED HEALTH CARE EDUCATION/TRAINING PROGRAM

## 2023-11-30 PROCEDURE — 82043 UR ALBUMIN QUANTITATIVE: CPT | Performed by: STUDENT IN AN ORGANIZED HEALTH CARE EDUCATION/TRAINING PROGRAM

## 2023-11-30 PROCEDURE — 82436 ASSAY OF URINE CHLORIDE: CPT | Performed by: STUDENT IN AN ORGANIZED HEALTH CARE EDUCATION/TRAINING PROGRAM

## 2023-11-30 PROCEDURE — 84300 ASSAY OF URINE SODIUM: CPT | Performed by: STUDENT IN AN ORGANIZED HEALTH CARE EDUCATION/TRAINING PROGRAM

## 2023-11-30 PROCEDURE — 82384 ASSAY THREE CATECHOLAMINES: CPT | Performed by: STUDENT IN AN ORGANIZED HEALTH CARE EDUCATION/TRAINING PROGRAM

## 2023-11-30 PROCEDURE — 84133 ASSAY OF URINE POTASSIUM: CPT | Performed by: STUDENT IN AN ORGANIZED HEALTH CARE EDUCATION/TRAINING PROGRAM

## 2023-11-30 PROCEDURE — 82533 TOTAL CORTISOL: CPT

## 2023-11-30 PROCEDURE — 83735 ASSAY OF MAGNESIUM: CPT | Performed by: STUDENT IN AN ORGANIZED HEALTH CARE EDUCATION/TRAINING PROGRAM

## 2023-11-30 PROCEDURE — 82088 ASSAY OF ALDOSTERONE: CPT

## 2023-12-04 ENCOUNTER — OFFICE VISIT (OUTPATIENT)
Dept: INTERNAL MEDICINE CLINIC | Facility: CLINIC | Age: 58
End: 2023-12-04

## 2023-12-04 VITALS
HEIGHT: 62 IN | BODY MASS INDEX: 26.79 KG/M2 | WEIGHT: 145.6 LBS | OXYGEN SATURATION: 98 % | HEART RATE: 94 BPM | RESPIRATION RATE: 16 BRPM | SYSTOLIC BLOOD PRESSURE: 138 MMHG | DIASTOLIC BLOOD PRESSURE: 74 MMHG

## 2023-12-04 DIAGNOSIS — R79.89 CREATININE ELEVATION: ICD-10-CM

## 2023-12-04 DIAGNOSIS — Z23 ENCOUNTER FOR IMMUNIZATION: Primary | ICD-10-CM

## 2023-12-04 DIAGNOSIS — Z00.00 ANNUAL PHYSICAL EXAM: ICD-10-CM

## 2023-12-04 DIAGNOSIS — L98.9 SKIN LESION: ICD-10-CM

## 2023-12-04 DIAGNOSIS — Z12.4 SCREENING FOR CERVICAL CANCER: ICD-10-CM

## 2023-12-04 DIAGNOSIS — I10 PRIMARY HYPERTENSION: ICD-10-CM

## 2023-12-04 PROBLEM — IMO0002 CREATININE ELEVATION: Status: ACTIVE | Noted: 2023-03-15

## 2023-12-04 LAB
DOPAMINE 24H UR-MRATE: <30 PG/ML (ref 0–48)
EPINEPH PLAS-MCNC: 18 PG/ML (ref 0–62)
NOREPINEPH PLAS-MCNC: 545 PG/ML (ref 0–874)

## 2023-12-04 NOTE — ASSESSMENT & PLAN NOTE
Patient is due for annual checkup  She declined today stating there are too much going on  We will do her annual physical in next visit

## 2023-12-04 NOTE — ASSESSMENT & PLAN NOTE
Patient saw nephrology last week had labs checked. Today noticed her creatinine increased from 0.7to 1.4. Patient reports some orthostatic symptoms so suspecting secondary to dehydration  Denies NSAID use  Vitals are stable electrolytes are normal  Patient sent a message to nephrology awaiting their response. I also sent a message to her nephrologist.  I recommend patient to work on aggressive oral hydration at home and recheck lab on Wednesday.

## 2023-12-04 NOTE — ASSESSMENT & PLAN NOTE
Recently saw dermatology for lesion under her breast.  She was prescribed a course of Keflex by dermatology for 10 days.   She will return to see them after completing antibiotics

## 2023-12-04 NOTE — PROGRESS NOTES
Name: Joelle Mcclure      : 1965      MRN: [id number]  Encounter Provider: Lisbet Hanna MD  Encounter Date: 2023   Encounter department: [facility]    Assessment & Plan     1. Encounter for immunization    2. Screening for cervical cancer    3. Annual physical exam  Assessment & Plan:  Patient is due for annual checkup  She declined today stating there are too much going on  We will do her annual physical in next visit      4. Creatinine elevation  Assessment & Plan:  Patient saw nephrology last week had labs checked. Today noticed her creatinine increased from 0.7to 1.4. Patient reports some orthostatic symptoms so suspecting secondary to dehydration  Denies NSAID use  Vitals are stable electrolytes are normal  Patient sent a message to nephrology awaiting their response. I also sent a message to her nephrologist.  I recommend patient to work on aggressive oral hydration at home and recheck lab on Wednesday. Orders:  -     Basic metabolic panel; Future    5. Skin lesion  Assessment & Plan:  Recently saw dermatology for lesion under her breast.  She was prescribed a course of Keflex by dermatology for 10 days. She will return to see them after completing antibiotics      6. Primary hypertension  Assessment & Plan:  Under control. Continue amlodipine           Depression Screening and Follow-up Plan: Patient was screened for depression during today's encounter. They screened negative with a PHQ-2 score of 0.         Subjective      HPI  Regular f/u  Review of Systems    Current Outpatient Medications on File Prior to Visit   Medication Sig    acetaminophen (TYLENOL) 500 mg tablet Take 2 tablets (1,000 mg total) by mouth 3 (three) times a day    amLODIPine (NORVASC) 10 mg tablet Take 1 tablet (10 mg total) by mouth daily (Patient taking differently: Take 20 mg by mouth daily)    cephalexin (KEFLEX) 500 mg capsule Take 1 capsule (500 mg total) by mouth 2 (two) times a day for 10 days    cyanocobalamin (VITAMIN B-12) 1000 MCG tablet Take 1,000 mcg by mouth daily    oxybutynin (DITROPAN) 5 mg tablet Take 1 tablet (5 mg total) by mouth every 6 (six) hours as needed (bladder spasms)    oxyCODONE (Roxicodone) 5 immediate release tablet Take 1 tablet (5 mg total) by mouth every 4 (four) hours as needed for moderate pain or severe pain Max Daily Amount: 30 mg    predniSONE 10 mg tablet Take 1 tablet (10 mg total) by mouth daily Please take for 4 weeks and then decrease by 5 mg increments every 2 weeks.  -- Taking 20mg and then 10mg - titrating    ursodiol (ACTIGALL) 300 mg capsule Take 3 capsules (900 mg total) by mouth in the morning    ondansetron (ZOFRAN) 4 mg tablet Take 1 tablet (4 mg total) by mouth every 8 (eight) hours as needed for nausea or vomiting (Patient not taking: Reported on 9/29/2023)    potassium chloride (MICRO-K) 10 MEQ CR capsule Take 4 capsules (40 mEq total) by mouth 2 (two) times a day       Objective     /74   Pulse 94   Resp 16   Ht 5' 2" (1.575 m)   Wt 66 kg (145 lb 9.6 oz)   SpO2 98%   BMI 26.63 kg/m²     Physical Exam  Brady Keyes MD

## 2023-12-05 ENCOUNTER — TELEPHONE (OUTPATIENT)
Dept: GASTROENTEROLOGY | Facility: CLINIC | Age: 58
End: 2023-12-05

## 2023-12-05 DIAGNOSIS — N17.9 AKI (ACUTE KIDNEY INJURY) (HCC): Primary | ICD-10-CM

## 2023-12-05 NOTE — TELEPHONE ENCOUNTER
Patient's  is aware of Dr. Nena Dasilva recommendation to increase prednisone to 15 mg daily and repeat labs in two weeks.

## 2023-12-09 LAB
ALDOST SERPL-MCNC: 26.5 NG/DL (ref 0–30)
ALDOST/RENIN PLAS-RTO: 21.8 {RATIO} (ref 0–30)
RENIN PLAS-CCNC: 1.22 NG/ML/HR (ref 0.17–5.38)

## 2023-12-11 ENCOUNTER — HOSPITAL ENCOUNTER (OUTPATIENT)
Dept: CT IMAGING | Facility: HOSPITAL | Age: 58
Discharge: HOME | End: 2023-12-11
Attending: INTERNAL MEDICINE
Payer: COMMERCIAL

## 2023-12-11 DIAGNOSIS — C20 RECTAL CANCER (HCC): ICD-10-CM

## 2023-12-11 PROCEDURE — 74177 CT ABD & PELVIS W/CONTRAST: CPT

## 2023-12-11 PROCEDURE — 71260 CT THORAX DX C+: CPT

## 2023-12-11 PROCEDURE — G1004 CDSM NDSC: HCPCS

## 2023-12-11 RX ADMIN — IOHEXOL 100 ML: 350 INJECTION, SOLUTION INTRAVENOUS at 10:43

## 2023-12-12 ENCOUNTER — OFFICE VISIT (OUTPATIENT)
Dept: HEMATOLOGY ONCOLOGY | Facility: CLINIC | Age: 58
End: 2023-12-12

## 2023-12-12 ENCOUNTER — DOCUMENTATION (OUTPATIENT)
Dept: HEMATOLOGY ONCOLOGY | Facility: MEDICAL CENTER | Age: 58
End: 2023-12-12

## 2023-12-12 VITALS
BODY MASS INDEX: 26.87 KG/M2 | HEIGHT: 62 IN | TEMPERATURE: 97.3 F | DIASTOLIC BLOOD PRESSURE: 62 MMHG | SYSTOLIC BLOOD PRESSURE: 102 MMHG | OXYGEN SATURATION: 98 % | RESPIRATION RATE: 17 BRPM | HEART RATE: 85 BPM | WEIGHT: 146 LBS

## 2023-12-12 DIAGNOSIS — C18.7 MALIGNANT NEOPLASM OF SIGMOID COLON (HCC): ICD-10-CM

## 2023-12-12 DIAGNOSIS — C20 RECTAL CANCER (HCC): ICD-10-CM

## 2023-12-12 DIAGNOSIS — C18.2 MALIGNANT NEOPLASM OF ASCENDING COLON (HCC): Primary | ICD-10-CM

## 2023-12-12 DIAGNOSIS — L72.3 INFLAMED EPIDERMOID CYST OF SKIN: ICD-10-CM

## 2023-12-12 DIAGNOSIS — L72.3 INFLAMED SEBACEOUS CYST: Primary | ICD-10-CM

## 2023-12-12 DIAGNOSIS — N13.5 URETERAL STRICTURE: ICD-10-CM

## 2023-12-12 RX ORDER — OXYBUTYNIN CHLORIDE 5 MG/1
5 TABLET ORAL EVERY 6 HOURS PRN
Qty: 30 TABLET | Refills: 0 | Status: ON HOLD | OUTPATIENT
Start: 2023-12-12 | End: 2023-12-16

## 2023-12-12 RX ORDER — DOXYCYCLINE 100 MG/1
CAPSULE ORAL
Qty: 28 CAPSULE | Refills: 0 | Status: SHIPPED | OUTPATIENT
Start: 2023-12-12 | End: 2023-12-16

## 2023-12-12 NOTE — PROGRESS NOTES
Please see the hematology/oncology follow-up note dictated by the internal medicine resident from December 12, 2023.

## 2023-12-12 NOTE — PROGRESS NOTES
Socrates Jha  1965  [address] [facility]  [address] [facility] [place] [id number]     DISCUSSION/SUMMARY:    70-year-old female previously diagnosed with rectal cancer. Problems:     stones. Patient with a history of obstruction and hematuria. Patient is being followed by urology. Had a recent FL retrograde pyelogram with removal of left ureteral stent and exchanging right ureteral stent. Sarcoidosis. Patient also being followed by hepatology as well as pulmonary. On prednisone. Rectal cancer. Initial clinical stage from another institution is cT3 cN0 cM0. Patient required upfront diverting procedure - reason not entirely clear (patient did not know), presumed pain, bleeding, obstruction. Mrs. Talisha Dubois apparently was able to tolerate the first cycle of CapeOX but developed a severe reaction on the second cycle. There are notes in Care Everywhere where the plan at [facility] was to desensitize the patient to oxaliplatin. Case previously discussed with Dr. Analilia Shoemaker, radiation oncology. The plan was to move forward with long course chemo RT as long as there was no evidence for metastases. Patient tolerated the concurrent 5-FU with RT. Patient then went on to surgery. NCCN guidelines 4.2022 state that for patients with T3, N any with clear CRM by MRI, neoadjuvant options include the standard chemotherapy for 3 to 4 months, long course chemo RT and then restaging with evaluation for resection. Another option includes upfront long course chemo RT with either capecitabine or fusion or 5-FU. Presently Mrs. Talisha Dubois feels well and clinically there are no concerning findings. Pathology results are listed below; patient was found to have ypT2 [2.5 cm] ypN0 [0/17] disease. Patient will continue with surveillance. Patient is being followed by colorectal surgery.   If no evidence of recurrence, patient will eventually undergo reanastomosis. Complained of drained left breast abscess this visit, she is on antibiotics. CT chest/abdomen/ pelvis for this visit is pending read, but CEA (3.7) looks mildly elevated compared to prior. Given mild elevation and CEA would be increased due to non-cancerous causes; it might possibly be secondary to the abscess, sarcoids, or recurrence. Will follow up CT scan read. Patient is to return in 12 weeks with blood work before. Mrs. Hakeem Sepulveda knows to call the hematology/oncology office if there are any other questions or concerns. Carefully review your medication list and verify that the list is accurate and up-to-date. Please call the hematology/oncology office if there are medications missing from the list, medications on the list that you are not currently taking or if there is a dosage or instruction that is different from how you're taking that medication. Patient goals and areas of care: Postoperative rectal cancer surveillance  Barriers to care: none  Patient is able to self-care  _____________________________________________________________________________________    Chief Complaint   Patient presents with    Follow-up     Oncology History   Malignant neoplasm of sigmoid colon ([address])   10/28/2022 Biopsy    Colon, Rectosigmoid Colon Mass Biopsy ([address] [place] [address], collected 10/28/2022):  - Adenocarcinoma arising in a tubular adenoma     12/1/2022 Biopsy    DIAGNOSIS LIVER, SEGMENT 3, RESECTION:  - HYALINIZED SUBCAPSULAR AND INTRA-PARENCHYMAL NODULES. NO MALIGNANCY SEEN. SEE NOTE  - MILD MACROVESICULAR STEATOSIS (25%). NO EVIDENCE OF STEATOHEPATITIS. TRICHOME STAIN SHOWS MILD PORTAL FIBROSIS.   - 2+ IRON DEPOSITION WITHIN KUPFFER CELLS (IRON STAIN), CONSISTENT WITH SECONDARY HEMOCHROMATOSIS. NOTE: THIS LIKELY REPRESENTS A MARKEDLY SCLEROTIC HEMANGIOMA OR CHANGES SECONDARY TO INJURY.                12/1/2022 Surgery    Laparoscopic diverting colostomy with liver biopsy. Dr. Lorin Menjivar.      12/25/2022 Initial Diagnosis    Malignant neoplasm of sigmoid colon ([address])     2022 - 1/10/2023 Chemotherapy    Oxaliplatin. [facility]     2/24/2023 -  Cancer Staged    Staging form: Colon and Rectum, AJCC 8th Edition  - Clinical: Stage IIA (cT3, cN0, cM0) - Signed by Yomi Mendoza MD on 2/24/2023  Total positive nodes: 0       4/17/2023 -  Chemotherapy    potassium chloride, 20 mEq, Intravenous, Once, 3 of 3 cycles  Administration: 20 mEq (4/17/2023), 20 mEq (4/24/2023), 20 mEq (5/1/2023)  alteplase (CATHFLO), 2 mg, Intracatheter, Every 1 Minute as needed, 6 of 6 cycles  fluorouracil (ADRUCIL) ambulatory infusion Soln, 225 mg/m2/day = 1,880 mg, Intravenous, Over 120 hours, 6 of 6 cycles     4/17/2023 - 5/25/2023 Radiation    Treatments:  Course: C1    Plan ID Energy Fractions Dose per Fraction (cGy) Dose Correction (cGy) Total Dose Delivered (cGy) Elapsed Days   CD Rectum 6X 3 / 3 180 0 540 2   Whole Pelvis 6X 25 / 25 180 0 4,500 35      Treatment Dates:  4/17/2023 - 5/25/2023. Rectal cancer ([address])   7/14/2023 Initial Diagnosis    Rectal cancer ([address])     9/6/2023 -  Cancer Staged    Staging form: Colon and Rectum, AJCC 8th Edition  - Clinical: Stage IIA (cT3, cN0, cM0) - Signed by Yomi Mendoza MD on 9/6/2023  Total positive nodes: 0         History of Present Illness: 56-year-old female previously diagnosed with rectal adenocarcinoma recently moving into [facility] and in need of a new medical oncologist.  Patient has a complicated cancer history; turns for follow-up. Patient was diagnosed with cT3 cN0 cM0 rectal adenocarcinoma in the [facility]. Although the specifics are not clear, patient required a diverting ileostomy. Patient was then started on neoadjuvant CapeOx. Patient was able to get through the first cycle but then had a reaction on the second cycle.   At that time, Mrs. Yung Caro and her  decided to move to [place]. Patient was treated somewhat with the Xeloda only but had not been treated for 2 months. Patient also has a history of sarcoidosis, on steroids (being tapered). Options were previously discussed at length with Dr. Jake Giles, radiation oncology and the plan was to proceed to concurrent chemoRT. Patient received 5-FU with RT and then went on to surgery. Presently patient states feeling well. Colostomy is working well. No nausea vomiting, no diarrhea or constipation, no GI bleeding. Patient has had recent issues with stones, being followed by . Appetite is good, weight is stable. Activities are close to baseline. No pain control issues. Review of Systems   Constitutional:  Negative for appetite change, chills, fatigue and fever. Respiratory:  Negative for apnea, cough and shortness of breath. Cardiovascular:  Negative for chest pain. Genitourinary:  Negative for dysuria and frequency. Musculoskeletal:  Negative for arthralgias and back pain. Skin: Negative. Neurological:  Positive for light-headedness. Negative for headaches. Psychiatric/Behavioral:  Negative for sleep disturbance. All other systems reviewed and are negative.       Patient Active Problem List   Diagnosis    Malignant neoplasm of sigmoid colon ([address])    History of Clostridium difficile colitis    Other hemochromatosis    Hypertension    Sarcoidosis    Impaired fasting glucose    Hypokalemia    Transaminitis    R flank pain with bilateral hydronephrosis    Adrenal nodule (HCC)    Bleeding from colostomy stoma ([address])    Other specified anemias    Thrombocytopenia ([address])    Nonrheumatic mitral valve regurgitation    Advance directive discussed with patient    Gross hematuria    Skin lesion    Abnormal CT of the chest    Kidney calculi    Dysuria    Chest pain    Creatinine elevation    Port-A-Cath in place    Drug-induced constipation    Palliative care patient    Cancer related pain    Rectal cancer 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    Ureteral stricture     Past Medical History:   Diagnosis Date    Cancer ([address])     Colon cancer ([address])     Fall     Headache     Hemochromatosis, unspecified     History of transfusion     2022 - no adverse reaction    Hypertension     Kidney calculi     Kidney stone     Liver disease     hemangioma    Muscle weakness     Personal history of COVID-19 12/2022    Sarcoidosis     Seizures ([address])     2022    Shingles      Past Surgical History:   Procedure Laterality Date    ABSCESS DRAINAGE      left breast    BREAST BIOPSY      CHEST TUBE INSERTION      COLON SURGERY      colostomy    COLONOSCOPY      FL GUIDED CENTRAL VENOUS ACCESS DEVICE INSERTION  03/21/2023    FL RETROGRADE PYELOGRAM  01/23/2023    FL RETROGRADE PYELOGRAM  04/03/2023    FL RETROGRADE PYELOGRAM  7/21/2023    FL RETROGRADE PYELOGRAM  10/11/2023    IR BIOPSY LIVER MASS  05/09/2023    LUNG BIOPSY      MT CYSTO BLADDER W/URETERAL CATHETERIZATION Bilateral 07/21/2023    Procedure: CYSTOSCOPY URETEROSCOPY RETROGRADE PYELOGRAM WITH BILATERAL STENT URETERAL EXCHANGE; LEFT LASER LITHOTRIPSY AND STONE BASKET RETRIEVAL;  Surgeon: Ofelia Loving MD;  [facility];  Service: Urology    MT CYSTO/URETERO W/LITHOTRIPSY &INDWELL STENT INSRT Bilateral 01/23/2023    Procedure: CYSTOSCOPY  RIGHT URETEROSCOPY WITH LITHOTRIPSY HOLMIUM LASER, BILATERAL RETROGRADE PYELOGRAM AND BILATERAL  URETERAL STENT INSERTION;  Surgeon: Ofelia Loving MD;  [facility];  Service: Urology    MT CYSTO/URETERO W/LITHOTRIPSY &INDWELL STENT INSRT Right 04/03/2023    Procedure: RIGHT URETEROSCOPY WITH LITHOTRIPSY HOLMIUM LASER, RETROGRADE PYELOGRAM; BILATERAL EXCHANGE STENT URETERAL;  Surgeon: Bernardo Vilchis MD;  [facility];  Service: Urology    AL CYSTO/URETERO W/LITHOTRIPSY &INDWELL STENT INSRT Bilateral 10/11/2023    Procedure: CYSTOSCOPY URETEROSCOPY RETROGRADE PYELOGRAM AND INSERTION STENT URETERAL [facility] RIGHT URETEROSCOPY, REMOVAL STENT LEFT SIDE;  Surgeon: Bernardo Vilchis MD;  [facility];  Service: Urology    AL INSJ TUNNELED CTR VAD W/SUBQ PORT AGE 5 YR/> N/A 03/21/2023    Procedure: INSERTION VENOUS PORT ( PORT-A-CATH) IR;  Surgeon: Lina Garcia DO;  [facility];  Service: Interventional Radiology    AL LAPS COLECTOMY PRTL W/COLOPXTSTMY LW ANAST N/A 8/17/2023    Procedure: RESECTION COLON LOW ANTERIOR LAPAROSCOPIC WITH ROBOTICS;  Surgeon: Felix Trammell MD;  Location: [facility];  Service: Colorectal    TONSILLECTOMY      URINARY SURGERY Bilateral     bilateral stents     Family History   Problem Relation Age of Onset    Cancer Mother     Cirrhosis Father     Breast cancer Neg Hx     Breast cancer additional onset Neg Hx      Social History     Socioeconomic History    Marital status:      Spouse name: Not on file    Number of children: Not on file    Years of education: Not on file    Highest education level: Not on file   Occupational History    Not on file   Tobacco Use    Smoking status: Never     Passive exposure: Past    Smokeless tobacco: Never   Vaping Use    Vaping Use: Never used   Substance and Sexual Activity    Alcohol use: Never    Drug use: Never    Sexual activity: Not Currently   Other Topics Concern    Not on file   Social History Narrative    From 4/14/23  note:    Emergency Contact: Liliana Simms (ex-) [phone] (Mobile)    Marital Status:     Caregiver/Support: ex- -Eloise Alvarez and two dtrs.      Children: two dtrs- nataliia 21 in [facility] and [place] [address] [facility]    Child/Elder care: no     Housing: two story house    Home Setup: one level    Lives With:  -anay and two dtrs Daily Living Activities: independent    Durable Medical Equipment: No    Ambulation: independent    Employment: disabled-SSI-$100 and pension-$700    [place] Status/Location: no     Ability to pay bills: yes. No barriers to paying monthly bills. POA/LW/AD: no.  - Douglas Wilkerson is healthcare representative. MSW emailed advance directive. Social Determinants of Health     Financial Resource Strain: Not on file   Food Insecurity: No Food Insecurity (8/18/2023)    Hunger Vital Sign     Worried About Running Out of Food in the Last Year: Never true     Ran Out of Food in the Last Year: Never true   Transportation Needs: No Transportation Needs (8/18/2023)    PRAPARE - Transportation     Lack of Transportation (Medical): No     Lack of Transportation (Non-Medical):  No   Physical Activity: Not on file   Stress: Not on file   Social Connections: Not on file   Intimate Partner Violence: Not on file   Housing Stability: Low Risk  (8/18/2023)    Housing Stability Vital Sign     Unable to Pay for Housing in the Last Year: No     Number of Places Lived in the Last Year: 1     Unstable Housing in the Last Year: No       Current Outpatient Medications:     acetaminophen (TYLENOL) 500 mg tablet, Take 2 tablets (1,000 mg total) by mouth 3 (three) times a day, Disp: 180 tablet, Rfl: 11    amLODIPine (NORVASC) 10 mg tablet, Take 1 tablet (10 mg total) by mouth daily (Patient taking differently: Take 20 mg by mouth daily), Disp: 90 tablet, Rfl: 3    cyanocobalamin (VITAMIN B-12) 1000 MCG tablet, Take 1,000 mcg by mouth daily, Disp: , Rfl:     doxycycline monohydrate (MONODOX) 100 mg capsule, Take 1 pill twice daily with food for 2 weeks, Disp: 28 capsule, Rfl: 0    oxybutynin (DITROPAN) 5 mg tablet, Take 1 tablet (5 mg total) by mouth every 6 (six) hours as needed (bladder spasms), Disp: 30 tablet, Rfl: 0    oxyCODONE (Roxicodone) 5 immediate release tablet, Take 1 tablet (5 mg total) by mouth every 4 (four) hours as needed for moderate pain or severe pain Max Daily Amount: 30 mg, Disp: 180 tablet, Rfl: 0    potassium chloride (MICRO-K) 10 MEQ CR capsule, Take 4 capsules (40 mEq total) by mouth 2 (two) times a day, Disp: 240 capsule, Rfl: 0    predniSONE 10 mg tablet, Take 1 tablet (10 mg total) by mouth daily Please take for 4 weeks and then decrease by 5 mg increments every 2 weeks. -- Taking 20mg and then 10mg - titrating, Disp: 30 tablet, Rfl: 2    ursodiol (ACTIGALL) 300 mg capsule, Take 3 capsules (900 mg total) by mouth in the morning, Disp: 90 capsule, Rfl: 11    ondansetron (ZOFRAN) 4 mg tablet, Take 1 tablet (4 mg total) by mouth every 8 (eight) hours as needed for nausea or vomiting (Patient not taking: Reported on 9/29/2023), Disp: 20 tablet, Rfl: 0    Allergies   Allergen Reactions    Oxaliplatin Shortness Of Breath     Reactions occurred with 2nd and 3rd infusions (about 1-3 hours from initiation of infusion) and required treatment with steroids and antihistamines. Please refer to allergy note on 2/7/2023 for detailed description of her reactions. Morphine Nausea Only     "Every dose made me nauseous" (oral dosing only, can tolerate IV morphine)       Vitals:    12/12/23 0957   BP: 102/62   Pulse: 85   Resp: 17   Temp: (!) 97.3 °F (36.3 °C)   SpO2: 98%     Physical Exam  Constitutional:       Appearance: She is well-developed. HENT:      Head: Normocephalic and atraumatic. Cardiovascular:      Rate and Rhythm: Normal rate and regular rhythm. Pulses: Normal pulses. Heart sounds: Normal heart sounds. Pulmonary:      Effort: Pulmonary effort is normal.      Breath sounds: Normal breath sounds. Comments: Clear bilaterally  Abdominal:      General: Abdomen is flat. Tenderness: There is no abdominal tenderness. Comments: + Bowel sounds, left upper quadrant ostomy in place, nontender, distended, no fluid, no rigidity or rebound   Skin:     General: Skin is warm.       Comments: Good color, warm, moist, no petechiae or ecchymosis   Neurological:      Mental Status: She is alert. Deep Tendon Reflexes: Reflexes are normal and symmetric. Extremities: No adenopathy in the neck, supraclavicular chain, axilla bilaterally  Lymphatics: no adenopathy in the neck, supraclavicular region, axilla and groin bilaterally    Performance Status: 1 - Symptomatic but completely ambulatory    Labs    9/25/2023 WBC = 6.56 hemoglobin = 9.5 hematocrit = 31.5 platelet = 672 neutrophil = 83% BUN = 23 creatinine = 0.82 calcium = 9.8 AST = 37 ALT = 76 alkaline phosphatase = 310 total bilirubin = 0.63    4/29/2023 CEA = 1.5  4/29/2023 WBC = 4.58 hemoglobin = 11.1 hematocrit = 36.6 platelet = 197 neutrophil = 84% potassium = 2.8 BUN = 19 creatinine = 0.73 alkaline phosphatase = 315    4/6/2023 hereditary hemochromatosis mutation analysis = no mutations identified    3/14/2023 WBC = 5.84 hemoglobin = 11.4 hematocrit = 36.6 MCV = 100 platelet = 100  3/38/7420 potassium = 2.8 BUN = 14 creatinine = 0.82 glucose = 293 calcium = 9.0 AST = 49 ALT = 58 alkaline phosphatase = 366 total protein = 6.2 total bilirubin = 1.3    Imaging    8/23/2023 CAT scan abdomen pelvis with contrast    IMPRESSION:     Multiple loops of abnormally dilated proximal to mid small bowel with relative collapse of the distal small bowel most compatible with small bowel obstruction. A clear transition point is not identified. Surgical consultation is advised. A few scattered tiny foci of free intraperitoneal air are noted likely postoperative in nature in this patient with a history of recent low anterior colon resection with right mid abdominal ileostomy. Small amount of intra-abdominal and pelvic ascites. Stable position of bilateral ureteral stents with no significant hydronephrosis. 3/18/2023 CAT scan chest abdomen pelvis with contrast    IMPRESSION:     1.   Probably right upper lobe interstitial opacities are nonspecific and could be due to infection or inflammation of the appropriate clinical context or fibrosis. Malignancy is felt to be less likely but not excluded. May consider short-term follow-up imaging in 2-3 months for reassessment. 2.  Consolidative changes in the left lower lobe and inferior lingula may be due to atelectasis. 3.  1.6 cm heterogeneous nodule the right lobe of the thyroid gland, recommend correlation with thyroid ultrasound if not obtained within the prior 6 months. 4.  There is an ill-defined area of hypoattenuation within the inferior right hepatic lobe of uncertain etiology. Not appreciated on the previous exam.  Consider further assessment with contrast-enhanced MRI. 5.  Bilateral ureteral stents with calcifications suspected along the proximal right ureteral stent few scattered calcifications along the mid to distal left ureteral stent. 6.  Additional findings discussed above. 3/17/2023 MRI pelvics rectal cancer staging    IMPRESSION:  Unenhanced MRI of the Pelvis (Rectal Protocol)      SINCE MRI PELVIS RECTAL CANCER STAGING DATED 11/1/2022, POST TREATMENT PRIMARY TUMOR ASSESSMENT: Incomplete response (likely residual tumor.) Please note, though incomplete, there has been substantial tumor response to treatment with a significant   decrease in size of the high rectal cancer since MRI 11/1/2022. SUSPICIOUS MESORECTAL LYMPH NODES: No.     SUSPICIOUS EXTRAMESORECTAL LYMPH NODES: No.       2/11/2023 CAT scan abdomen pelvis with contrast    IMPRESSION:     Interval decrease in right-sided hydronephrosis. Thickening of the ascending colon and cecum which may represent colitis. Follow-up with gastroenterology. Stable 1 cm right adrenal gland nodule. Although its imaging features are indeterminate, it does not have suspicious imaging features (heterogeneity, necrosis, irregular margins), therefore this is likely benign, and can be followed by non-contrast abdomen CT or MRI in 12 months.   If patient has history of adrenal hyperfunction, consider biochemical evaluation. Pathology    Case Report   Surgical Pathology Report                         Case: [id number]                                    Authorizing Provider:  Dylon Huggins MD      Collected:           08/17/2023 1020               Ordering Location:     [facility]      Received:            08/17/2023 [facility] Operating Room                                                       Pathologist:           Florencia Johnson MD                                                          Specimens:   A) - Colon, Low Anterior Resection. Additional proximal colon "Ostomy"- Tattoo marks                 distal  (DMT)                                                                                        B) - Anastomatic site, Final Distal Rectal Margin (DMT)                                    Final Diagnosis   A. Rectosigmoid (low anterior resection):  - Adenocarcinoma arising in a tubular adenoma (2.5cm)  - Seventeen (17) lymph nodes negative for carcinoma (0/17)  - Margins negative for carcinoma   - See staging synoptic (ypT2N0)     Comment:  - MMR IHC was performed on the prior outside biopsy showing no loss of MMR IHC nuclear expression: Low risk of MSI-H.   - D2-40 and CD31 highlight vessels. B. Colon, final distal margin (excision):  - No carcinoma identified     Interpretation performed at [facility], [address] [place] [address]      [facility] electronically signed by Florencia Johnson MD on 8/24/2023 at  7:22 AM   Electronically signed by Florencia Johnson MD on 8/24/2023 at  6:59 AM   Additional Information    All reported additional testing was performed with appropriately reactive controls.   These tests were developed and their performance characteristics determined by [facility] or appropriate performing facility, though some tests may be performed on tissues which have not been validated for performance characteristics (such as staining performed on alcohol exposed cell blocks and decalcified tissues). Results should be interpreted with caution and in the context of the patients’ clinical condition. These tests may not be cleared or approved by the U.S. Food and Drug Administration, though the FDA has determined that such clearance or approval is not necessary. These tests are used for clinical purposes and they should not be regarded as investigational or for research. This laboratory has been approved by CLIA 88, designated as a high-complexity laboratory and is qualified to perform these tests.   .   Synoptic Checklist   COLON AND RECTUM: Resection, Including Transanal Disk Excision of Rectal Neoplasms  8th Edition - Protocol posted: 6/22/2022  COLON AND RECTUM: RESECTION - All Specimens  SPECIMEN   Procedure  Low anterior resection    Macroscopic Evaluation of Mesorectum  Near complete    TUMOR   Tumor Site  Rectosigmoid      Rectum    Rectal Tumor Location  Entirely above anterior peritoneal reflection    Histologic Type  Adenocarcinoma    Histologic Grade  G2, moderately differentiated    Tumor Size  Greatest dimension (Centimeters): 2.5 cm   Tumor Extent  Invades into muscularis propria    Macroscopic Tumor Perforation  Not identified    Lymphovascular Invasion  Not identified    Perineural Invasion  Not identified    Number of Tumor Buds  3 per 'hotspot' field   Tumor Bud Score  Low (0-4)    Type of Polyp in which Invasive Carcinoma Arose  Tubular adenoma    Treatment Effect  Present, with residual cancer showing evident tumor regression, but more than single cells or rare small groups of cancer cells (partial response, score 2)    MARGINS   Margin Status for Invasive Carcinoma  All margins negative for invasive carcinoma    Closest Margin(s) to Invasive Carcinoma  Radial (circumferential) or mesenteric    Distance from Invasive Carcinoma to [facility] Margin  3.5 cm   Distance from Invasive Carcinoma to Radial (Circumferential) Margin  Distance already reported as closest margin    Distance from Invasive Carcinoma to Distal Margin  Greater than 1 cm    Margin Status for Non-Invasive Tumor  All margins negative for high-grade dysplasia / intramucosal carcinoma and low-grade dysplasia    REGIONAL LYMPH NODES   Regional Lymph Node Status  All regional lymph nodes negative for tumor    Number of Lymph Nodes Examined  17    Tumor Deposits  Not identified    PATHOLOGIC STAGE CLASSIFICATION (pTNM, AJCC 8th Edition)   Reporting of pT, pN, and (when applicable) pM categories is based on information available to the pathologist at the time the report is issued. As per the AJCC (Chapter 1, 8th Ed.) it is the managing physician's responsibility to establish the final pathologic stage based upon all pertinent information, including but potentially not limited to this pathology report. TNM Descriptors  y (post-treatment)    pT Category  pT2    pN Category  pN0    ADDITIONAL FINDINGS   Additional Findings  None identified               Case Report   Surgical Pathology Report                         Case: [id number]                                    Authorizing Provider:  Ryan Skelton MD           Collected:           02/22/2023 0731               Ordering Location:     [facility]      Received:            02/22/2023 [facility]                                                                                   Laboratory                                                                    Pathologist:           Yarely Chu MD                                                                  Specimen:    Colon, Rectosigmoid Colon Mass Biopsy (1 slide [id number] [facility] [place], collected 10/28/2022)                                                              Final Diagnosis   A.  Colon, Rectosigmoid Colon Mass Biopsy (1 slide [facility], collected 10/28/2022):  - Adenocarcinoma arising in a tubular adenoma. Note: Per outside report (slides were not available for review).       RESULTS OF IMMUNOHISTOCHEMICAL ANALYSIS FOR MISMATCH REPAIR PROTEIN LOSS     RESULTS:  Antibody            Clone                 Description                     Results  MLH1                 M1                     Mismatch repair protein  Intact nuclear expression  MSH2                [id number]        Mismatch repair protein  Intact nuclear expression  MSH6                SP93                  Mismatch repair protein  Intact nuclear expression  PMS2                 A16-4                 Mismatch repair protein  Intact nuclear expression      Electronically signed by Jg Cooper MD on 2/22/2023 at 10:11 AM

## 2023-12-14 ENCOUNTER — HOSPITAL ENCOUNTER (EMERGENCY)
Facility: HOSPITAL | Age: 58
End: 2023-12-15
Attending: EMERGENCY MEDICINE
Payer: COMMERCIAL

## 2023-12-14 ENCOUNTER — APPOINTMENT (EMERGENCY)
Dept: CT IMAGING | Facility: HOSPITAL | Age: 58
End: 2023-12-14
Payer: COMMERCIAL

## 2023-12-14 ENCOUNTER — DOCUMENTATION (OUTPATIENT)
Dept: HEMATOLOGY ONCOLOGY | Facility: CLINIC | Age: 58
End: 2023-12-14

## 2023-12-14 ENCOUNTER — NURSE TRIAGE (OUTPATIENT)
Age: 58
End: 2023-12-14

## 2023-12-14 DIAGNOSIS — N39.0 UTI (URINARY TRACT INFECTION): ICD-10-CM

## 2023-12-14 DIAGNOSIS — N13.5 URETERAL STRICTURE: ICD-10-CM

## 2023-12-14 DIAGNOSIS — R10.2 PELVIC PAIN: Primary | ICD-10-CM

## 2023-12-14 DIAGNOSIS — N20.1 LEFT URETERAL STONE: ICD-10-CM

## 2023-12-14 DIAGNOSIS — N20.0 BILATERAL NEPHROLITHIASIS: ICD-10-CM

## 2023-12-14 DIAGNOSIS — N20.1 URETERAL CALCULI: ICD-10-CM

## 2023-12-14 PROBLEM — N13.1 HYDRONEPHROSIS DUE TO URETERAL STRICTURE: Status: ACTIVE | Noted: 2022-12-25

## 2023-12-14 LAB
ALBUMIN SERPL BCP-MCNC: 4.2 G/DL (ref 3.5–5)
ALP SERPL-CCNC: 317 U/L (ref 34–104)
ALT SERPL W P-5'-P-CCNC: 40 U/L (ref 7–52)
ANION GAP SERPL CALCULATED.3IONS-SCNC: 7 MMOL/L
AST SERPL W P-5'-P-CCNC: 24 U/L (ref 13–39)
BACTERIA UR QL AUTO: ABNORMAL /HPF
BASOPHILS # BLD AUTO: 0.01 THOUSANDS/ÂΜL (ref 0–0.1)
BASOPHILS NFR BLD AUTO: 0 % (ref 0–1)
BILIRUB SERPL-MCNC: 0.5 MG/DL (ref 0.2–1)
BILIRUB UR QL STRIP: NEGATIVE
BUN SERPL-MCNC: 33 MG/DL (ref 5–25)
CALCIUM SERPL-MCNC: 10.2 MG/DL (ref 8.4–10.2)
CHLORIDE SERPL-SCNC: 107 MMOL/L (ref 96–108)
CLARITY UR: CLEAR
CO2 SERPL-SCNC: 23 MMOL/L (ref 21–32)
COLOR UR: ABNORMAL
CREAT SERPL-MCNC: 0.96 MG/DL (ref 0.6–1.3)
EOSINOPHIL # BLD AUTO: 0.01 THOUSAND/ÂΜL (ref 0–0.61)
EOSINOPHIL NFR BLD AUTO: 0 % (ref 0–6)
ERYTHROCYTE [DISTWIDTH] IN BLOOD BY AUTOMATED COUNT: 15.1 % (ref 11.6–15.1)
GFR SERPL CREATININE-BSD FRML MDRD: 66 ML/MIN/1.73SQ M
GLUCOSE SERPL-MCNC: 134 MG/DL (ref 65–140)
GLUCOSE UR STRIP-MCNC: NEGATIVE MG/DL
HCT VFR BLD AUTO: 36.6 % (ref 34.8–46.1)
HGB BLD-MCNC: 11.3 G/DL (ref 11.5–15.4)
HGB UR QL STRIP.AUTO: ABNORMAL
IMM GRANULOCYTES # BLD AUTO: 0.04 THOUSAND/UL (ref 0–0.2)
IMM GRANULOCYTES NFR BLD AUTO: 0 % (ref 0–2)
KETONES UR STRIP-MCNC: NEGATIVE MG/DL
LACTATE SERPL-SCNC: 1.2 MMOL/L (ref 0.5–2)
LEUKOCYTE ESTERASE UR QL STRIP: ABNORMAL
LIPASE SERPL-CCNC: 38 U/L (ref 11–82)
LYMPHOCYTES # BLD AUTO: 0.32 THOUSANDS/ÂΜL (ref 0.6–4.47)
LYMPHOCYTES NFR BLD AUTO: 4 % (ref 14–44)
MCH RBC QN AUTO: 25.1 PG (ref 26.8–34.3)
MCHC RBC AUTO-ENTMCNC: 30.9 G/DL (ref 31.4–37.4)
MCV RBC AUTO: 81 FL (ref 82–98)
MONOCYTES # BLD AUTO: 0.2 THOUSAND/ÂΜL (ref 0.17–1.22)
MONOCYTES NFR BLD AUTO: 2 % (ref 4–12)
MUCOUS THREADS UR QL AUTO: ABNORMAL
NEUTROPHILS # BLD AUTO: 8.39 THOUSANDS/ÂΜL (ref 1.85–7.62)
NEUTS SEG NFR BLD AUTO: 94 % (ref 43–75)
NITRITE UR QL STRIP: POSITIVE
NON-SQ EPI CELLS URNS QL MICRO: ABNORMAL /HPF
NRBC BLD AUTO-RTO: 0 /100 WBCS
PH UR STRIP.AUTO: 5.5 [PH]
PLATELET # BLD AUTO: 280 THOUSANDS/UL (ref 149–390)
PLATELET BLD QL SMEAR: ADEQUATE
PMV BLD AUTO: 9.1 FL (ref 8.9–12.7)
POTASSIUM SERPL-SCNC: 4.6 MMOL/L (ref 3.5–5.3)
PROT SERPL-MCNC: 7.5 G/DL (ref 6.4–8.4)
PROT UR STRIP-MCNC: ABNORMAL MG/DL
RBC # BLD AUTO: 4.51 MILLION/UL (ref 3.81–5.12)
RBC #/AREA URNS AUTO: ABNORMAL /HPF
RBC MORPH BLD: NORMAL
SODIUM SERPL-SCNC: 137 MMOL/L (ref 135–147)
SP GR UR STRIP.AUTO: 1.01 (ref 1–1.03)
TOXIC GRANULES BLD QL SMEAR: PRESENT
UROBILINOGEN UR STRIP-ACNC: <2 MG/DL
WBC # BLD AUTO: 8.97 THOUSAND/UL (ref 4.31–10.16)
WBC #/AREA URNS AUTO: ABNORMAL /HPF

## 2023-12-14 PROCEDURE — G1004 CDSM NDSC: HCPCS

## 2023-12-14 PROCEDURE — 96375 TX/PRO/DX INJ NEW DRUG ADDON: CPT

## 2023-12-14 PROCEDURE — 99284 EMERGENCY DEPT VISIT MOD MDM: CPT

## 2023-12-14 PROCEDURE — 96366 THER/PROPH/DIAG IV INF ADDON: CPT

## 2023-12-14 PROCEDURE — 87086 URINE CULTURE/COLONY COUNT: CPT | Performed by: EMERGENCY MEDICINE

## 2023-12-14 PROCEDURE — 83605 ASSAY OF LACTIC ACID: CPT | Performed by: PHYSICIAN ASSISTANT

## 2023-12-14 PROCEDURE — 96365 THER/PROPH/DIAG IV INF INIT: CPT

## 2023-12-14 PROCEDURE — 85025 COMPLETE CBC W/AUTO DIFF WBC: CPT | Performed by: EMERGENCY MEDICINE

## 2023-12-14 PROCEDURE — 74176 CT ABD & PELVIS W/O CONTRAST: CPT

## 2023-12-14 PROCEDURE — 81001 URINALYSIS AUTO W/SCOPE: CPT | Performed by: EMERGENCY MEDICINE

## 2023-12-14 PROCEDURE — 80053 COMPREHEN METABOLIC PANEL: CPT | Performed by: EMERGENCY MEDICINE

## 2023-12-14 PROCEDURE — 87040 BLOOD CULTURE FOR BACTERIA: CPT | Performed by: PHYSICIAN ASSISTANT

## 2023-12-14 PROCEDURE — 96361 HYDRATE IV INFUSION ADD-ON: CPT

## 2023-12-14 PROCEDURE — 36415 COLL VENOUS BLD VENIPUNCTURE: CPT

## 2023-12-14 PROCEDURE — 99285 EMERGENCY DEPT VISIT HI MDM: CPT | Performed by: PHYSICIAN ASSISTANT

## 2023-12-14 PROCEDURE — 83690 ASSAY OF LIPASE: CPT | Performed by: EMERGENCY MEDICINE

## 2023-12-14 RX ORDER — KETOROLAC TROMETHAMINE 30 MG/ML
15 INJECTION, SOLUTION INTRAMUSCULAR; INTRAVENOUS ONCE
Status: COMPLETED | OUTPATIENT
Start: 2023-12-14 | End: 2023-12-14

## 2023-12-14 RX ORDER — SODIUM CHLORIDE 9 MG/ML
150 INJECTION, SOLUTION INTRAVENOUS CONTINUOUS
Status: DISCONTINUED | OUTPATIENT
Start: 2023-12-14 | End: 2023-12-15 | Stop reason: HOSPADM

## 2023-12-14 RX ORDER — CEFAZOLIN SODIUM 1 G/50ML
1000 SOLUTION INTRAVENOUS ONCE
Status: CANCELLED | OUTPATIENT
Start: 2023-12-14 | End: 2023-12-14

## 2023-12-14 RX ADMIN — KETOROLAC TROMETHAMINE 15 MG: 30 INJECTION, SOLUTION INTRAMUSCULAR; INTRAVENOUS at 19:59

## 2023-12-14 RX ADMIN — SODIUM CHLORIDE 150 ML/HR: 0.9 INJECTION, SOLUTION INTRAVENOUS at 22:41

## 2023-12-14 RX ADMIN — SODIUM CHLORIDE 1000 ML: 0.9 INJECTION, SOLUTION INTRAVENOUS at 19:59

## 2023-12-14 RX ADMIN — CEFTRIAXONE SODIUM 1000 MG: 10 INJECTION, POWDER, FOR SOLUTION INTRAVENOUS at 19:59

## 2023-12-14 NOTE — PROGRESS NOTES
65-year-old female previously diagnosed with rectal cancer. Patient has a complicated cancer history; also with a history of sarcoidosis. I received a call from radiology earlier today. Repeat CAT scans demonstrated bilateral hydronephrosis, stone. I was able to speak with Dr. Natalya Mishra today, he knows the patient well. He will review the scans and call the patient. The scans also demonstrated new pulmonary lesions. Patient has a history of sarcoid but the radiologist was concerned about recurrence. The plan is to address the  problems first and then discuss options regarding the pulmonary lesions (need for biopsy etc). Above discussed with  and wife/patient on the phone today.

## 2023-12-14 NOTE — TELEPHONE ENCOUNTER
Patient has findings of obstructing stone with hydronephrosis as well as right sided hydronephrosis with no stone. Please have patient follow up within 1 week for discussion.  Thanks

## 2023-12-14 NOTE — TELEPHONE ENCOUNTER
Patients spouse Eugene Part calling requesting a call back from provider about CT chest abdomen pelvis w/contrast done 12/11/23. Patient Danielle's Oncologist recommended patient to call our office, and review findings. CB#[phone]      12/11/23CT CHEST, ABDOMEN AND PELVIS WITH IV CONTRAST   INDICATION:   C20: Malignant neoplasm of rectum. IMPRESSION:   New moderate to severe left-sided hydroureteronephrosis to the level of a 6 mm obstructing distal ureteral calculus. The left ureteral stent has been removed in the interim. New moderate to severe right-sided hydroureteronephrosis to the level of the proximal ureter with stably positioned indwelling ureteral stent, the proximal portion of which is probably encrusted, similar to prior. Bilateral nephrolithiasis. Findings highly worrisome for pulmonary metastatic disease as evidenced by interval development of multiple new/enlarging pulmonary nodules, many of which are cavitary, in this patient with history of rectal adenocarcinoma. Cavitary pulmonary sarcoidosis   is considered less likely as it is very uncommon and is usually reported in those with severe and active disease. Spleen remains top normal in size with heterogeneous appearance and some degree of nodularity, similar to prior, likely related to the patient's known history of sarcoidosis.

## 2023-12-14 NOTE — TELEPHONE ENCOUNTER
Pt's ex- is concerned pt is having trouble urinating. Pt has put multiple calls/mychart messages into our office regarding this pt. Pt's PCP said the kidney issue is urgent for this pt. He may take her to ED due to having trouble urinating.     Mateo's [phone]

## 2023-12-15 ENCOUNTER — TELEPHONE (OUTPATIENT)
Dept: UROLOGY | Facility: AMBULATORY SURGERY CENTER | Age: 58
End: 2023-12-15

## 2023-12-15 ENCOUNTER — ANESTHESIA EVENT (OUTPATIENT)
Dept: PERIOP | Facility: HOSPITAL | Age: 58
End: 2023-12-15
Payer: COMMERCIAL

## 2023-12-15 ENCOUNTER — ANESTHESIA (OUTPATIENT)
Dept: PERIOP | Facility: HOSPITAL | Age: 58
End: 2023-12-15
Payer: COMMERCIAL

## 2023-12-15 ENCOUNTER — APPOINTMENT (EMERGENCY)
Dept: RADIOLOGY | Facility: HOSPITAL | Age: 58
End: 2023-12-15
Payer: COMMERCIAL

## 2023-12-15 ENCOUNTER — HOSPITAL ENCOUNTER (OUTPATIENT)
Facility: HOSPITAL | Age: 58
Setting detail: OUTPATIENT SURGERY
LOS: 1 days | Discharge: HOME | End: 2023-12-16
Attending: INTERNAL MEDICINE | Admitting: INTERNAL MEDICINE
Payer: COMMERCIAL

## 2023-12-15 VITALS
OXYGEN SATURATION: 98 % | SYSTOLIC BLOOD PRESSURE: 138 MMHG | TEMPERATURE: 98.3 F | HEIGHT: 62 IN | RESPIRATION RATE: 16 BRPM | DIASTOLIC BLOOD PRESSURE: 62 MMHG | BODY MASS INDEX: 26.7 KG/M2 | HEART RATE: 84 BPM

## 2023-12-15 DIAGNOSIS — N20.1 URETERAL CALCULI: ICD-10-CM

## 2023-12-15 DIAGNOSIS — I10 PRIMARY HYPERTENSION: ICD-10-CM

## 2023-12-15 DIAGNOSIS — N13.5 URETERAL STRICTURE: ICD-10-CM

## 2023-12-15 PROBLEM — N30.00 ACUTE CYSTITIS WITHOUT HEMATURIA: Status: ACTIVE | Noted: 2023-01-06

## 2023-12-15 PROBLEM — R91.8 PULMONARY NODULES: Status: ACTIVE | Noted: 2023-12-15

## 2023-12-15 PROCEDURE — C1747 URETEROSCOPE DIGITAL FLEX SNGL USE RVS DEFLECTION APTRA: HCPCS | Performed by: UROLOGY

## 2023-12-15 PROCEDURE — C1769 GUIDE WIRE: HCPCS | Performed by: UROLOGY

## 2023-12-15 PROCEDURE — 96375 TX/PRO/DX INJ NEW DRUG ADDON: CPT

## 2023-12-15 PROCEDURE — 36415 COLL VENOUS BLD VENIPUNCTURE: CPT | Performed by: PHYSICIAN ASSISTANT

## 2023-12-15 PROCEDURE — 99024 POSTOP FOLLOW-UP VISIT: CPT | Performed by: UROLOGY

## 2023-12-15 PROCEDURE — 52356 CYSTO/URETERO W/LITHOTRIPSY: CPT | Performed by: UROLOGY

## 2023-12-15 PROCEDURE — 96361 HYDRATE IV INFUSION ADD-ON: CPT

## 2023-12-15 PROCEDURE — 99214 OFFICE O/P EST MOD 30 MIN: CPT | Performed by: UROLOGY

## 2023-12-15 PROCEDURE — 96376 TX/PRO/DX INJ SAME DRUG ADON: CPT

## 2023-12-15 PROCEDURE — 99223 1ST HOSP IP/OBS HIGH 75: CPT | Performed by: INTERNAL MEDICINE

## 2023-12-15 PROCEDURE — C1758 CATHETER, URETERAL: HCPCS | Performed by: UROLOGY

## 2023-12-15 PROCEDURE — 87040 BLOOD CULTURE FOR BACTERIA: CPT | Performed by: PHYSICIAN ASSISTANT

## 2023-12-15 PROCEDURE — 74420 UROGRAPHY RTRGR +-KUB: CPT

## 2023-12-15 PROCEDURE — C2617 STENT, NON-COR, TEM W/O DEL: HCPCS | Performed by: UROLOGY

## 2023-12-15 PROCEDURE — 52332 CYSTOSCOPY AND TREATMENT: CPT | Performed by: UROLOGY

## 2023-12-15 PROCEDURE — 87086 URINE CULTURE/COLONY COUNT: CPT | Performed by: UROLOGY

## 2023-12-15 RX ORDER — POTASSIUM CHLORIDE 20 MEQ/1
20 TABLET, EXTENDED RELEASE ORAL 2 TIMES DAILY
Status: DISCONTINUED | OUTPATIENT
Start: 2023-12-15 | End: 2023-12-16 | Stop reason: HOSPADM

## 2023-12-15 RX ORDER — OXYCODONE HYDROCHLORIDE 10 MG/1
10 TABLET ORAL EVERY 4 HOURS PRN
Status: DISCONTINUED | OUTPATIENT
Start: 2023-12-15 | End: 2023-12-16 | Stop reason: HOSPADM

## 2023-12-15 RX ORDER — PROPOFOL 10 MG/ML
INJECTION, EMULSION INTRAVENOUS AS NEEDED
Status: DISCONTINUED | OUTPATIENT
Start: 2023-12-15 | End: 2023-12-15

## 2023-12-15 RX ORDER — PREDNISONE 10 MG/1
10 TABLET ORAL DAILY
Status: DISCONTINUED | OUTPATIENT
Start: 2023-12-15 | End: 2023-12-16 | Stop reason: HOSPADM

## 2023-12-15 RX ORDER — KETOROLAC TROMETHAMINE 30 MG/ML
15 INJECTION, SOLUTION INTRAMUSCULAR; INTRAVENOUS ONCE
Status: COMPLETED | OUTPATIENT
Start: 2023-12-15 | End: 2023-12-15

## 2023-12-15 RX ORDER — MAGNESIUM HYDROXIDE 1200 MG/15ML
LIQUID ORAL AS NEEDED
Status: DISCONTINUED | OUTPATIENT
Start: 2023-12-15 | End: 2023-12-15 | Stop reason: HOSPADM

## 2023-12-15 RX ORDER — ONDANSETRON 2 MG/ML
INJECTION INTRAMUSCULAR; INTRAVENOUS AS NEEDED
Status: DISCONTINUED | OUTPATIENT
Start: 2023-12-15 | End: 2023-12-15

## 2023-12-15 RX ORDER — CEFAZOLIN SODIUM 1 G/50ML
1000 SOLUTION INTRAVENOUS ONCE
Status: COMPLETED | OUTPATIENT
Start: 2023-12-15 | End: 2023-12-15

## 2023-12-15 RX ORDER — OXYCODONE HYDROCHLORIDE 5 MG/1
5 TABLET ORAL EVERY 6 HOURS PRN
Status: DISCONTINUED | OUTPATIENT
Start: 2023-12-15 | End: 2023-12-15

## 2023-12-15 RX ORDER — FENTANYL CITRATE 50 UG/ML
INJECTION, SOLUTION INTRAMUSCULAR; INTRAVENOUS AS NEEDED
Status: DISCONTINUED | OUTPATIENT
Start: 2023-12-15 | End: 2023-12-15

## 2023-12-15 RX ORDER — CEFAZOLIN SODIUM 1 G/50ML
1000 SOLUTION INTRAVENOUS ONCE
Status: DISCONTINUED | OUTPATIENT
Start: 2023-12-15 | End: 2023-12-15 | Stop reason: SDUPTHER

## 2023-12-15 RX ORDER — FENTANYL CITRATE/PF 50 MCG/ML
25 SYRINGE (ML) INJECTION
Status: DISCONTINUED | OUTPATIENT
Start: 2023-12-15 | End: 2023-12-15 | Stop reason: HOSPADM

## 2023-12-15 RX ORDER — KETOROLAC TROMETHAMINE 30 MG/ML
15 INJECTION, SOLUTION INTRAMUSCULAR; INTRAVENOUS AS NEEDED
Status: DISCONTINUED | OUTPATIENT
Start: 2023-12-15 | End: 2023-12-15 | Stop reason: HOSPADM

## 2023-12-15 RX ORDER — ACETAMINOPHEN 325 MG/1
975 TABLET ORAL EVERY 8 HOURS SCHEDULED
Status: DISCONTINUED | OUTPATIENT
Start: 2023-12-15 | End: 2023-12-16 | Stop reason: HOSPADM

## 2023-12-15 RX ORDER — SODIUM CHLORIDE, SODIUM LACTATE, POTASSIUM CHLORIDE, CALCIUM CHLORIDE 600; 310; 30; 20 MG/100ML; MG/100ML; MG/100ML; MG/100ML
50 INJECTION, SOLUTION INTRAVENOUS CONTINUOUS
Status: DISCONTINUED | OUTPATIENT
Start: 2023-12-15 | End: 2023-12-16 | Stop reason: HOSPADM

## 2023-12-15 RX ORDER — SODIUM CHLORIDE, SODIUM LACTATE, POTASSIUM CHLORIDE, CALCIUM CHLORIDE 600; 310; 30; 20 MG/100ML; MG/100ML; MG/100ML; MG/100ML
INJECTION, SOLUTION INTRAVENOUS CONTINUOUS PRN
Status: DISCONTINUED | OUTPATIENT
Start: 2023-12-15 | End: 2023-12-15

## 2023-12-15 RX ORDER — HYDROMORPHONE HCL/PF 1 MG/ML
0.5 SYRINGE (ML) INJECTION EVERY 4 HOURS PRN
Status: DISCONTINUED | OUTPATIENT
Start: 2023-12-15 | End: 2023-12-16 | Stop reason: HOSPADM

## 2023-12-15 RX ORDER — URSODIOL 300 MG/1
900 CAPSULE ORAL DAILY
Status: DISCONTINUED | OUTPATIENT
Start: 2023-12-15 | End: 2023-12-16 | Stop reason: HOSPADM

## 2023-12-15 RX ORDER — ONDANSETRON 2 MG/ML
4 INJECTION INTRAMUSCULAR; INTRAVENOUS
Status: DISCONTINUED | OUTPATIENT
Start: 2023-12-15 | End: 2023-12-15 | Stop reason: HOSPADM

## 2023-12-15 RX ORDER — LIDOCAINE HYDROCHLORIDE 10 MG/ML
INJECTION, SOLUTION EPIDURAL; INFILTRATION; INTRACAUDAL; PERINEURAL AS NEEDED
Status: DISCONTINUED | OUTPATIENT
Start: 2023-12-15 | End: 2023-12-15

## 2023-12-15 RX ORDER — OXYBUTYNIN CHLORIDE 5 MG/1
5 TABLET ORAL 3 TIMES DAILY
Status: DISCONTINUED | OUTPATIENT
Start: 2023-12-15 | End: 2023-12-16 | Stop reason: HOSPADM

## 2023-12-15 RX ORDER — MIDAZOLAM HYDROCHLORIDE 2 MG/2ML
INJECTION, SOLUTION INTRAMUSCULAR; INTRAVENOUS AS NEEDED
Status: DISCONTINUED | OUTPATIENT
Start: 2023-12-15 | End: 2023-12-15

## 2023-12-15 RX ORDER — HYDROMORPHONE HCL IN WATER/PF 6 MG/30 ML
0.2 PATIENT CONTROLLED ANALGESIA SYRINGE INTRAVENOUS ONCE
Status: COMPLETED | OUTPATIENT
Start: 2023-12-15 | End: 2023-12-15

## 2023-12-15 RX ORDER — OXYCODONE HYDROCHLORIDE 5 MG/1
5 TABLET ORAL EVERY 4 HOURS PRN
Status: DISCONTINUED | OUTPATIENT
Start: 2023-12-15 | End: 2023-12-16 | Stop reason: HOSPADM

## 2023-12-15 RX ORDER — OXYCODONE HYDROCHLORIDE 10 MG/1
10 TABLET ORAL EVERY 6 HOURS PRN
Status: DISCONTINUED | OUTPATIENT
Start: 2023-12-15 | End: 2023-12-15

## 2023-12-15 RX ORDER — CEPHALEXIN 500 MG/1
500 CAPSULE ORAL EVERY 8 HOURS SCHEDULED
Status: DISCONTINUED | OUTPATIENT
Start: 2023-12-15 | End: 2023-12-16 | Stop reason: HOSPADM

## 2023-12-15 RX ORDER — ENOXAPARIN SODIUM 100 MG/ML
40 INJECTION SUBCUTANEOUS DAILY
Status: DISCONTINUED | OUTPATIENT
Start: 2023-12-15 | End: 2023-12-16 | Stop reason: HOSPADM

## 2023-12-15 RX ORDER — ONDANSETRON 2 MG/ML
4 INJECTION INTRAMUSCULAR; INTRAVENOUS ONCE AS NEEDED
Status: DISCONTINUED | OUTPATIENT
Start: 2023-12-15 | End: 2023-12-15 | Stop reason: HOSPADM

## 2023-12-15 RX ORDER — PHENAZOPYRIDINE HYDROCHLORIDE 100 MG/1
100 TABLET, FILM COATED ORAL
Status: DISCONTINUED | OUTPATIENT
Start: 2023-12-15 | End: 2023-12-16 | Stop reason: HOSPADM

## 2023-12-15 RX ORDER — AMLODIPINE BESYLATE 10 MG/1
10 TABLET ORAL DAILY
Status: DISCONTINUED | OUTPATIENT
Start: 2023-12-15 | End: 2023-12-16 | Stop reason: HOSPADM

## 2023-12-15 RX ADMIN — MIDAZOLAM 2 MG: 1 INJECTION INTRAMUSCULAR; INTRAVENOUS at 12:56

## 2023-12-15 RX ADMIN — SODIUM CHLORIDE, SODIUM LACTATE, POTASSIUM CHLORIDE, AND CALCIUM CHLORIDE: .6; .31; .03; .02 INJECTION, SOLUTION INTRAVENOUS at 12:27

## 2023-12-15 RX ADMIN — FENTANYL CITRATE 25 MCG: 50 INJECTION INTRAMUSCULAR; INTRAVENOUS at 13:38

## 2023-12-15 RX ADMIN — FENTANYL CITRATE 25 MCG: 50 INJECTION INTRAMUSCULAR; INTRAVENOUS at 13:22

## 2023-12-15 RX ADMIN — OXYBUTYNIN CHLORIDE 5 MG: 5 TABLET ORAL at 22:05

## 2023-12-15 RX ADMIN — ACETAMINOPHEN 975 MG: 325 TABLET, FILM COATED ORAL at 22:05

## 2023-12-15 RX ADMIN — CEFAZOLIN SODIUM 1000 MG: 1 SOLUTION INTRAVENOUS at 12:59

## 2023-12-15 RX ADMIN — CEPHALEXIN 500 MG: 500 CAPSULE ORAL at 22:05

## 2023-12-15 RX ADMIN — FENTANYL CITRATE 25 MCG: 50 INJECTION INTRAMUSCULAR; INTRAVENOUS at 13:49

## 2023-12-15 RX ADMIN — FENTANYL CITRATE 25 MCG: 50 INJECTION INTRAMUSCULAR; INTRAVENOUS at 14:26

## 2023-12-15 RX ADMIN — LIDOCAINE HYDROCHLORIDE 40 MG: 10 INJECTION, SOLUTION EPIDURAL; INFILTRATION; INTRACAUDAL; PERINEURAL at 13:00

## 2023-12-15 RX ADMIN — ONDANSETRON 4 MG: 2 INJECTION INTRAMUSCULAR; INTRAVENOUS at 13:16

## 2023-12-15 RX ADMIN — ACETAMINOPHEN 975 MG: 325 TABLET, FILM COATED ORAL at 15:48

## 2023-12-15 RX ADMIN — KETOROLAC TROMETHAMINE 15 MG: 30 INJECTION, SOLUTION INTRAMUSCULAR; INTRAVENOUS at 06:50

## 2023-12-15 RX ADMIN — AMLODIPINE BESYLATE 10 MG: 10 TABLET ORAL at 15:48

## 2023-12-15 RX ADMIN — OXYCODONE HYDROCHLORIDE 10 MG: 10 TABLET ORAL at 17:20

## 2023-12-15 RX ADMIN — SODIUM CHLORIDE, SODIUM LACTATE, POTASSIUM CHLORIDE, AND CALCIUM CHLORIDE 50 ML/HR: .6; .31; .03; .02 INJECTION, SOLUTION INTRAVENOUS at 15:50

## 2023-12-15 RX ADMIN — PROPOFOL 180 MG: 10 INJECTION, EMULSION INTRAVENOUS at 13:00

## 2023-12-15 RX ADMIN — HYDROMORPHONE HYDROCHLORIDE 0.5 MG: 1 INJECTION, SOLUTION INTRAMUSCULAR; INTRAVENOUS; SUBCUTANEOUS at 15:35

## 2023-12-15 RX ADMIN — OXYBUTYNIN CHLORIDE 5 MG: 5 TABLET ORAL at 15:48

## 2023-12-15 RX ADMIN — PREDNISONE 10 MG: 10 TABLET ORAL at 15:49

## 2023-12-15 RX ADMIN — HYDROMORPHONE HYDROCHLORIDE 0.2 MG: 0.2 INJECTION, SOLUTION INTRAMUSCULAR; INTRAVENOUS; SUBCUTANEOUS at 06:50

## 2023-12-15 RX ADMIN — FENTANYL CITRATE 25 MCG: 50 INJECTION INTRAMUSCULAR; INTRAVENOUS at 13:24

## 2023-12-15 RX ADMIN — ENOXAPARIN SODIUM 40 MG: 40 INJECTION SUBCUTANEOUS at 15:49

## 2023-12-15 RX ADMIN — CYANOCOBALAMIN TAB 500 MCG 1000 MCG: 500 TAB at 15:48

## 2023-12-15 NOTE — ASSESSMENT & PLAN NOTE
S/p right ureteral stent exchange 10/11  CT with moderate to severe right-sided hydroureteronephrosis with stent in stable position felt to be encrusted proximally  Creat 0.96  Transfer to [address] [place] [address] to [facility]   N.p.o. after midnight for possible right stent exchange as well as left stent placement 12/15

## 2023-12-15 NOTE — EMTALA/ACUTE CARE TRANSFER
[address] [address]  Dept: [phone]      EMTALA TRANSFER CONSENT    NAME Rocky Hoff                                         1965                              MRN [id number]    I have been informed of my rights regarding examination, treatment, and transfer   by Dr. Layton Tam MD    Benefits: Specialized equipment and/or services available at the receiving facility (Include comment)________________________    Risks: Potential for delay in receiving treatment, Potential deterioration of medical condition, Loss of IV, Increased discomfort during transfer, Possible worsening of condition or death during transfer      Transfer Request   I acknowledge that my medical condition has been evaluated and explained to me by the emergency department physician or other qualified medical person and/or my attending physician who has recommended and offered to me further medical examination and treatment. I understand the Hospital's obligation with respect to the treatment and stabilization of my emergency medical condition. I nevertheless request to be transferred. I release the Hospital, the doctor, and any other persons caring for me from all responsibility or liability for any injury or ill effects that may result from my transfer and agree to accept all responsibility for the consequences of my choice to transfer, rather than receive stabilizing treatment at the Hospital. I understand that because the transfer is my request, my insurance may not provide reimbursement for the services. The Hospital will assist and direct me and my family in how to make arrangements for transfer, but the hospital is not liable for any fees charged by the transport service.   In spite of this understanding, I refuse to consent to further medical examination and treatment which has been offered to me, and request transfer to [address] [place] [address] [address] Name, [address] [place]. I authorize the performance of emergency medical procedures and treatments upon me in both transit and upon arrival at the receiving facility. Additionally, I authorize the release of any and all medical records to the receiving facility and request they be transported with me, if possible. I authorize the performance of emergency medical procedures and treatments upon me in both transit and upon arrival at the receiving facility. Additionally, I authorize the release of any and all medical records to the receiving facility and request they be transported with me, if possible. I understand that the safest mode of transportation during a medical emergency is an ambulance and that the Hospital advocates the use of this mode of transport. Risks of traveling to the receiving facility by car, including absence of medical control, life sustaining equipment, such as oxygen, and medical personnel has been explained to me and I fully understand them. (BETTY CORRECT BOX BELOW)  [  ]  I consent to the stated transfer and to be transported by ambulance/helicopter. [  ]  I consent to the stated transfer, but refuse transportation by ambulance and accept full responsibility for my transportation by car. I understand the risks of non-ambulance transfers and I exonerate the Hospital and its staff from any deterioration in my condition that results from this refusal.    X___________________________________________    DATE  23  TIME________  Signature of patient or legally responsible individual signing on patient behalf           RELATIONSHIP TO PATIENT_________________________          Provider Certification    NAME Jason Eaton                                        [facility] 1965                              MRN [id number]    A medical screening exam was performed on the above named patient.   Based on the examination:    Condition Necessitating Transfer The primary encounter diagnosis was Pelvic pain. Diagnoses of Ureteral calculi, Ureteral stricture, Bilateral nephrolithiasis, Left ureteral stone, and UTI (urinary tract infection) were also pertinent to this visit. Patient Condition: The patient has been stabilized such that within reasonable medical probability, no material deterioration of the patient condition or the condition of the unborn child(eugenie) is likely to result from the transfer    Reason for Transfer: Level of Care needed not available at this facility    Transfer Requirements: [address] available and qualified personnel available for treatment as acknowledged by Flaco Hernandez  Agreed to accept transfer and to provide appropriate medical treatment as acknowledged by       LORETTA Ramirez  Appropriate medical records of the examination and treatment of the patient are provided at the time of transfer   [address] [facility] [place] [address] _______  Transfer will be performed by qualified personnel from    and appropriate transfer equipment as required, including the use of necessary and appropriate life support measures.     Provider Certification: I have examined the patient and explained the following risks and benefits of being transferred/refusing transfer to the patient/family:  General risk, such as traffic hazards, adverse weather conditions, rough terrain or turbulence, possible failure of equipment (including vehicle or aircraft), or consequences of actions of persons outside the control of the transport personnel, Unanticipated needs of medical equipment and personnel during transport, The possibility of a transport vehicle being unavailable, Risk of worsening condition      Based on these reasonable risks and benefits to the patient and/or the unborn child(eugenie), and based upon the information available at the time of the patient’s examination, I certify that the medical benefits reasonably to be expected from the provision of appropriate medical treatments at another medical facility outweigh the increasing risks, if any, to the individual’s medical condition, and in the case of labor to the unborn child, from effecting the transfer.     X____________________________________________ DATE 12/14/23        TIME_______      ORIGINAL - SEND TO MEDICAL RECORDS   COPY - SEND WITH PATIENT DURING TRANSFER

## 2023-12-15 NOTE — TELEPHONE ENCOUNTER
----- Message from [employer] sent at 12/14/2023  4:20 PM EST -----  Regarding: Nerimargie Price CT scan results  Contact: [phone]    Donaldo Carerra. This is Alicia Poe calling on behalf of [employer]. Sherman Ojeda had a CT scan on 12/11 that showed problems with her kidneys. I spoke with Dr. Jazmin Bess who ordered the scan, and he said that Sherman Ojeda has kidney issues may be urgent. I called your office earlier today but have not heard back. We don't know whether we should go to the ER. Please let us know. Thank you.      Lavonne Monzon

## 2023-12-15 NOTE — OP NOTE
OPERATIVE REPORT  PATIENT NAME: Danielle Haque    :  1965  MRN: [id number]  Pt Location: [facility] ROOM 01    SURGERY DATE: 12/15/2023    Surgeons and Role:     * Derick Bhakta MD - Primary    Preop Diagnosis:  Ureteral calculi [N20.1]  Ureteral stricture [N13.5]  Right ureteral stricture, left ureteral calculus    Post-Op Diagnosis Codes:     * Ureteral calculi [N20.1]     * Ureteral stricture [N13.5]  Right ureteral stricture, left ureteral calculus    Procedure(s):  Cystoscopy, bilateral retrograde pyelograms, exchange of right ureteral stent, left ureteroscopy with holmium laser lithotripsy and placement of left ureteral stent.    Specimen(s):  ID Type Source Tests Collected by Time Destination   A : urine culture Urine Urine, Cystoscopic URINE CULTURE Derick Bhakta MD 12/15/2023 1311        Estimated Blood Loss:   Minimal    Drains:  Ileostomy Loop RLQ (Active)   Number of days: 120       Ureteral Internal Stent Left ureter 6 Fr. (Active)   Number of days: 147       Ureteral Internal Stent Right ureter 7 Fr. (Active)   Number of days: 147       Ureteral Internal Stent Right ureter 7 Fr. (Active)   Number of days: 65       Anesthesia Type:   General    Operative Indications:  Ureteral calculi [N20.1]  Ureteral stricture [N13.5]      Operative Findings:  Right stent exchange secondary to history of right ureteral stricture, left ureteroscopy performed for obstructing calculus.  New left 22-6 Uruguayan double-J ureteral stent placed without a string secondary to the impacted nature of the stone and the challenge to remove the stone.    Complications:   None    Procedure and Technique:  Danielle is a 58-year-old female with a history of rectal cancer status post chemoradiation and surgery.  She has a history of a distal right ureteral stricture managed by Dr. Stephens with a chronic indwelling stent.  He recently removed her left ureteral stent.  She returns in follow-up today complaining of  new left-sided flank pain.  CT scan shows a mid ureteral calculus with obstruction.  I recommended return to the operating room for exchange of a right ureteral stent along with left ureteroscopy with holmium laser lithotripsy.  Risk and benefits of the procedure discussed and reviewed informed consent obtained.  Patient was brought to the operating room on December 15, 2023.  After the smooth induction of general LMA anesthesia, patient was placed in the dorsolithotomy position.  Her genitalia was prepped and draped in sterile fashion.  Intravenous antibiotics were administered.  Timeout was performed with all members the operative team confirming the patient's identity and procedure to be performed.    22 Samoan rigid cystoscope with 30 degree lens was inserted.  The bladder was thoroughly inspected.  Attention was first focused on the right ureteral orifice.  The stent was grasped and removed as a wire was inserted.  A 5 Samoan open-ended catheter was inserted and a right retrograde pyelogram was performed.  Narrowing of the mid to distal ureter was appreciated.  I therefore changed the right stent for a new 22-6 Samoan double-J ureteral stent.  The proximal coil was appreciated within the right renal pelvis and the distal coil was visualized within the bladder.  There was no string left in place.    I then focused my attention on the left side.  5 Samoan open-ended catheter was inserted followed by a left retrograde pyelogram.  An obstructing calculus in the mid ureter was appreciated with minimal passage of contrast proximal.  A wire was inserted as a safety followed by a semirigid ureteroscope.  It was very difficult to engage the stone based on the angulation and narrowing of the ureter.  I attempted to basket the stone as well and this was unsuccessful.  Ultimately the stone was released by the basket as I attempted to laser within the basket and again this attempt was unsuccessful.  The basket was removed  completely intact and then I exchanged the semirigid ureteroscope for a Bard single use flexible ureteroscope.  I was able to freely pass the scope into the bladder identify and engage the left ureteral orifice with a wire was present and easily navigate the scope to the level of the stone.  I then decimated the stone with a 272 µm holmium laser fiber until all the fragments were small enough to be passable using vapor tunnel dusting settings.  I could not basket the stone from the ureter based on the angulation and difficulty approaching this area.  I injected further contrast which showed left-sided hydronephrosis.  There was no filling defect or additional lesions identified within the ureter.  The scope was slowly withdrawn and the wire was backloaded through the cystoscope.  A left 22-6 Frisian double-J ureteral stent was then placed in standard fashion.  The proximal coil was appreciated within the left renal pelvis and the distal coil was visualized within the bladder.  There was no string left in place.    Overall the patient tolerated the procedure well and there were no complications.  The patient was extubated in the operating room and transferred the PACU in stable condition at the conclusion of the case.    Patient Disposition:  PACU         SIGNATURE: Derick Bhakta MD  DATE: December 15, 2023  TIME: 2:09 PM

## 2023-12-15 NOTE — H&P
[facility]  H&P  Name: Danielle Haque 58 y.o. female I MRN: [id number]  Unit/Bed#: [facility]PHP 922-01 I Date of Admission: 12/15/2023   Date of Service: 12/15/2023 I Hospital Day: 0      Assessment/Plan   * Ureteral calculi  Assessment & Plan  Bilateral nephrolithiasis. 5 mm obstructing stone in the mid/distal left ureter with moderate left hydroureteronephrosis, as before.   Patient presented to ED with worsening pelvic pain  Underlying history of ureteral strictures with indwelling right ureteral stent  Abdomen pelvis CT scan with bilateral nephrolithiasis. 5 mm obstructing stone in the mid/distal left ureter with moderate left hydroureteronephrosis, as before. right-sided nephroureteral stent in place. Moderate right hydroureteronephrosis    Patient was evaluated by urology  She underwent cystoscopy, bilateral retrograde pyelograms, exchange of right ureteral stent, left ureteroscopy with holmium laser lithotripsy and placement of left ureteral stent.     Continue with IV fluid and pain control      Acute cystitis with hematuria  Assessment & Plan  Abnormal urinalysis consistent with UTI  Patient is afebrile without leukocytosis  Currently on Keflex per urology  Follow on urine culture    Pulmonary nodules  Assessment & Plan  CT abdomen pelvis with several subcentimeter pulmonary nodules are redemonstrated, as before, suspicious for pulmonary metastatic disease. Better described on recent prior CT of the chest, abdomen, and pelvis.   Outpatient oncology follow-up    Rectal cancer (HCC)  Assessment & Plan  Outpatient hematology oncology follow-up    Sarcoidosis  Assessment & Plan  Follow with pulmonology outpatient  Continue with prednisone    Hypertension  Assessment & Plan  Acceptable BP  Continue Norvasc         VTE Pharmacologic Prophylaxis: VTE Score: 7 High Risk (Score >/= 5) - Pharmacological DVT Prophylaxis Ordered: enoxaparin (Lovenox). Sequential Compression Devices  Ordered.  Code Status: Level 1 - Full Code   Discussion with family: Patient    Anticipated Length of Stay: Patient will be admitted on an inpatient basis with an anticipated length of stay of greater than 2 midnights secondary to management of kidney stone.    Total Time Spent on Date of Encounter in care of patient: 65 mins. This time was spent on one or more of the following: performing physical exam; counseling and coordination of care; obtaining or reviewing history; documenting in the medical record; reviewing/ordering tests, medications or procedures; communicating with other healthcare professionals and discussing with patient's family/caregivers.    Chief Complaint:   Pelvic pain    History of Present Illness:  Danielle Haque is a 58 y.o. female with a PMH of rectal cancer status post chemoradiation, lung nodules, hypertension, sarcoidosis, right ureteral strictures managed with right ureteral stent who presents with severe pelvic/genital pain.  Patient with history of complex bilateral ureteral and intrarenal calculi with known right proximal ureteral stricture , most recently underwent cystoscopy with right retrograde pyelogram right ureteroscopy and right ureteral stent exchange with left ureteral stent removal on 10/11/2023.     Patient was evaluated in the emergency room she was found to have new moderate to severe left-sided hydroureteronephrosis with obstructing distal ureteral calculus  Also was found to have abnormal urinalysis  Patient was transferred to [facility], evaluated by urology and she underwent cystoscopy with exchange of right ureteral stent, left ureteroscopy with  laser lithotripsy and placement of left ureteral stent today    Patient continued to have genital pain  Review of Systems:  Review of Systems   Constitutional:  Negative for chills and fatigue.   Respiratory:  Negative for cough and shortness of breath.    Cardiovascular:  Negative for chest pain.   Gastrointestinal:   Positive for abdominal pain and nausea.   Genitourinary:  Positive for flank pain and pelvic pain.   All other systems reviewed and are negative.      Past Medical and Surgical History:   Past Medical History:   Diagnosis Date    Cancer (HCC)     Colon cancer (HCC)     Fall     Headache     Hemochromatosis, unspecified     History of transfusion     2022 - no adverse reaction    Hypertension     Kidney calculi     Kidney stone     Liver disease     hemangioma    Muscle weakness     Personal history of COVID-19 12/2022    Sarcoidosis     Seizures (HCC)     2022    Shingles        Past Surgical History:   Procedure Laterality Date    ABSCESS DRAINAGE      left breast    BREAST BIOPSY      CHEST TUBE INSERTION      COLON SURGERY      colostomy    COLONOSCOPY      FL GUIDED CENTRAL VENOUS ACCESS DEVICE INSERTION  03/21/2023    FL RETROGRADE PYELOGRAM  01/23/2023    FL RETROGRADE PYELOGRAM  04/03/2023    FL RETROGRADE PYELOGRAM  7/21/2023    FL RETROGRADE PYELOGRAM  10/11/2023    IR BIOPSY LIVER MASS  05/09/2023    LUNG BIOPSY      MN CYSTO BLADDER W/URETERAL CATHETERIZATION Bilateral 07/21/2023    Procedure: CYSTOSCOPY URETEROSCOPY RETROGRADE PYELOGRAM WITH BILATERAL STENT URETERAL EXCHANGE; LEFT LASER LITHOTRIPSY AND STONE BASKET RETRIEVAL;  Surgeon: José Luis Stephens MD;  [facility];  Service: Urology    MN CYSTO/URETERO W/LITHOTRIPSY &INDWELL STENT INSRT Bilateral 01/23/2023    Procedure: CYSTOSCOPY  RIGHT URETEROSCOPY WITH LITHOTRIPSY HOLMIUM LASER, BILATERAL RETROGRADE PYELOGRAM AND BILATERAL  URETERAL STENT INSERTION;  Surgeon: José Luis Stephens MD;  [facility];  Service: Urology    MN CYSTO/URETERO W/LITHOTRIPSY &INDWELL STENT INSRT Right 04/03/2023    Procedure: RIGHT URETEROSCOPY WITH LITHOTRIPSY HOLMIUM LASER, RETROGRADE PYELOGRAM; BILATERAL EXCHANGE STENT URETERAL;  Surgeon: José Luis Stephens MD;  [facility];  Service: Urology    MN CYSTO/URETERO W/LITHOTRIPSY  &INDWELL STENT INSRT Bilateral 10/11/2023    Procedure: CYSTOSCOPY URETEROSCOPY RETROGRADE PYELOGRAM AND INSERTION STENT URETERAL DIAGNOSTINC RIGHT URETEROSCOPY, REMOVAL STENT LEFT SIDE;  Surgeon: José Luis Stephens MD;  [facility];  Service: Urology    HI INSJ TUNNELED CTR VAD W/SUBQ PORT AGE 5 YR/> N/A 03/21/2023    Procedure: INSERTION VENOUS PORT ( PORT-A-CATH) IR;  Surgeon: Mark Amos DO;  [facility]: [facility];  Service: Interventional Radiology    HI LAPS COLECTOMY PRTL W/COLOPXTSTMY LW ANAST N/A 8/17/2023    Procedure: RESECTION COLON LOW ANTERIOR LAPAROSCOPIC WITH ROBOTICS;  Surgeon: Sree Umanzor MD;  [facility]: [facility];  Service: Colorectal    TONSILLECTOMY      URINARY SURGERY Bilateral     bilateral stents       Meds/Allergies:  Prior to Admission medications    Medication Sig Start Date End Date Taking? Authorizing Provider   acetaminophen (TYLENOL) 500 mg tablet Take 2 tablets (1,000 mg total) by mouth 3 (three) times a day 11/13/23   Koko Pillai MD   amLODIPine (NORVASC) 10 mg tablet Take 1 tablet (10 mg total) by mouth daily  Patient taking differently: Take 20 mg by mouth daily 7/19/23   Leena Anaya MD   cyanocobalamin (VITAMIN B-12) 1000 MCG tablet Take 1,000 mcg by mouth daily    Historical Provider, MD   doxycycline monohydrate (MONODOX) 100 mg capsule Take 1 pill twice daily with food for 2 weeks 12/12/23 12/26/23  Abhishek Wheeler MD   ondansetron (ZOFRAN) 4 mg tablet Take 1 tablet (4 mg total) by mouth every 8 (eight) hours as needed for nausea or vomiting  Patient not taking: Reported on 9/29/2023 8/30/23   Leena Anaya MD   oxybutynin (DITROPAN) 5 mg tablet TAKE 1 TABLET (5 MG TOTAL) BY MOUTH EVERY 6 (SIX) HOURS AS NEEDED (BLADDER SPASMS) 12/12/23   José Luis Stephens MD   oxyCODONE (Roxicodone) 5 immediate release tablet Take 1 tablet (5 mg total) by mouth every 4 (four) hours as needed for moderate pain or severe pain Max Daily Amount: 30 mg 9/11/23    "Koko Pillai MD   potassium chloride (MICRO-K) 10 MEQ CR capsule Take 4 capsules (40 mEq total) by mouth 2 (two) times a day 10/3/23 12/12/23  Esperanza Kay Jesse,    predniSONE 10 mg tablet Take 1 tablet (10 mg total) by mouth daily Please take for 4 weeks and then decrease by 5 mg increments every 2 weeks. -- Taking 20mg and then 10mg - titrating 9/21/23 12/20/23  Akua Machuca MD   ursodiol (ACTIGALL) 300 mg capsule Take 3 capsules (900 mg total) by mouth in the morning 6/16/23 6/15/24  Akua Machuca MD     I have reviewed home medications with patient personally.    Allergies:   Allergies   Allergen Reactions    Oxaliplatin Shortness Of Breath     Reactions occurred with 2nd and 3rd infusions (about 1-3 hours from initiation of infusion) and required treatment with steroids and antihistamines. Please refer to allergy note on 2/7/2023 for detailed description of her reactions.    Morphine Nausea Only     \"Every dose made me nauseous\" (oral dosing only, can tolerate IV morphine)       Social History:  Marital Status:      Substance Use History:   Social History     Substance and Sexual Activity   Alcohol Use Never     Social History     Tobacco Use   Smoking Status Never    Passive exposure: Past   Smokeless Tobacco Never     Social History     Substance and Sexual Activity   Drug Use Never       Family History:    Family History   Problem Relation Age of Onset    Cancer Mother     Cirrhosis Father     Breast cancer Neg Hx     Breast cancer additional onset Neg Hx       Physical Exam:     Vitals:   Blood Pressure: 145/79 (12/15/23 1519)  Pulse: 92 (12/15/23 1519)  Temperature: 98.2 °F (36.8 °C) (12/15/23 1519)  Respirations: 18 (12/15/23 1519)  SpO2: 96 % (12/15/23 1519)    Physical Exam  Vitals reviewed.   Constitutional:       Appearance: She is ill-appearing.      Comments: Uncomfortable with pain   HENT:      Head: Normocephalic and atraumatic.      Mouth/Throat:      Mouth: Mucous membranes are " moist.      Pharynx: No oropharyngeal exudate.   Eyes:      Extraocular Movements: Extraocular movements intact.      Conjunctiva/sclera: Conjunctivae normal.      Pupils: Pupils are equal, round, and reactive to light.   Cardiovascular:      Rate and Rhythm: Normal rate and regular rhythm.      Pulses: Normal pulses.      Heart sounds: Normal heart sounds. No murmur heard.  Pulmonary:      Effort: Pulmonary effort is normal.      Breath sounds: Normal breath sounds.   Abdominal:      General: Bowel sounds are normal. There is no distension.      Palpations: Abdomen is soft.      Tenderness: There is abdominal tenderness. There is right CVA tenderness and left CVA tenderness.   Musculoskeletal:         General: Normal range of motion.      Cervical back: Normal range of motion and neck supple.      Right lower leg: No edema.      Left lower leg: No edema.   Skin:     General: Skin is warm and dry.      Findings: No rash.   Neurological:      General: No focal deficit present.      Mental Status: She is alert and oriented to person, place, and time.      Cranial Nerves: No cranial nerve deficit.   Psychiatric:         Mood and Affect: Mood normal.            Additional Data:     Lab Results:  Results from last 7 days   Lab Units 12/14/23  1844   WBC Thousand/uL 8.97   HEMOGLOBIN g/dL 11.3*   HEMATOCRIT % 36.6   PLATELETS Thousands/uL 280   NEUTROS PCT % 94*   LYMPHS PCT % 4*   MONOS PCT % 2*   EOS PCT % 0     Results from last 7 days   Lab Units 12/14/23  1844   SODIUM mmol/L 137   POTASSIUM mmol/L 4.6   CHLORIDE mmol/L 107   CO2 mmol/L 23   BUN mg/dL 33*   CREATININE mg/dL 0.96   ANION GAP mmol/L 7   CALCIUM mg/dL 10.2   ALBUMIN g/dL 4.2   TOTAL BILIRUBIN mg/dL 0.50   ALK PHOS U/L 317*   ALT U/L 40   AST U/L 24   GLUCOSE RANDOM mg/dL 134                 Results from last 7 days   Lab Units 12/14/23 1958   LACTIC ACID mmol/L 1.2       Lines/Drains:  Invasive Devices       Central Venous Catheter Line  Duration              Port A Cath 03/21/23 Right Internal jugular 269 days              Peripheral Intravenous Line  Duration             Peripheral IV 12/14/23 Left Antecubital <1 day    Peripheral IV 12/14/23 Proximal;Right;Ventral (anterior) Forearm <1 day              Drain  Duration             Ureteral Internal Stent Left ureter 6 Fr. 147 days    Ureteral Internal Stent Right ureter 7 Fr. 147 days    Ileostomy Loop  days    Ureteral Internal Stent Right ureter 7 Fr. 65 days                    Central Line:  Goal for removal:  Chronic           Imaging: CT scan results reviewed  No orders to display       EKG and Other Studies Reviewed on Admission:   No EKG  Previous records review    ** Please Note: This note has been constructed using a voice recognition system. **

## 2023-12-15 NOTE — DISCHARGE INSTRUCTIONS
Reading Physician Reading Date Result Priority   [address] [facility] Yuan Choi  [phone] 12/14/2023      Narrative & Impression  CT ABDOMEN AND PELVIS WITHOUT IV CONTRAST - LOW DOSE RENAL STONE     INDICATION:   pelvic pain. History of malignant neoplasm of the rectum     COMPARISON: CT chest, abdomen, and pelvis dated 12/11/2023     TECHNIQUE:  Low radiation dose thin section CT examination of the abdomen and pelvis was performed without intravenous or oral contrast according to a protocol specifically designed to evaluate for urinary tract calculus. Axial, sagittal, and coronal 2D   reformatted images were created from the source data and submitted for interpretation. Evaluation for pathology in the abdomen and pelvis that is unrelated to urinary tract calculi is limited. Radiation dose length product (DLP) for this visit:  131 mGy-cm . This examination, like all CT scans performed in the [facility], was performed utilizing techniques to minimize radiation dose exposure, including the use of iterative   reconstruction and automated exposure control. URINARY TRACT FINDINGS:     RIGHT KIDNEY AND URETER: Multiple subcentimeter nonobstructing stones in the right kidney. Right-sided nephroureteral stent in place. Kadi stent calcifications along the proximal portion redemonstrated. There is moderate right hydroureteronephrosis, this   appears intervally improved when compared to the prior. Right kidney otherwise appears grossly unremarkable. LEFT KIDNEY AND URETER: Several subcentimeter nonobstructing stones in the left kidney. Again seen is an obstructing stone in the mid/distal left ureter which measures approximately 0.5 x 0.5 cm in size. There is moderate left hydroureteronephrosis   noted, similar to the prior. URINARY BLADDER: Right-sided nephroureteral stent terminates in the bladder; otherwise grossly unremarkable.         ADDITIONAL FINDINGS:     LOWER CHEST: Scarring/atelectasis in the left lower lung field. Several subcentimeter pulmonary nodules are redemonstrated, as before, better described on recent prior CT of the chest, abdomen, and pelvis. SOLID VISCERA: Benign-appearing subcentimeter nodule in the right adrenal gland. Limited low radiation dose noncontrast CT evaluation demonstrates no clinically significant abnormality of the imaged portions of the liver, spleen, pancreas, or adrenal   glands. GALLBLADDER/BILIARY TREE:  No calcified gallstones. No pericholecystic inflammatory change. No biliary dilatation. STOMACH AND BOWEL: Postsurgical changes in the rectum. Relatively underdistended colon. Loop ileostomy in the right lower quadrant no discrete evidence of bowel obstruction. APPENDIX:  No findings to suggest appendicitis. ABDOMINOPELVIC CAVITY:  No ascites. No pneumoperitoneum. No lymphadenopathy. VESSELS: Mild atherosclerosis; no aortic aneurysm. REPRODUCTIVE ORGANS:  Unremarkable for patient's age. ABDOMINAL WALL/INGUINAL REGIONS: Scarring in the anterior abdominal wall. OSSEOUS STRUCTURES:  No acute fracture or destructive osseous lesion. IMPRESSION:     Bilateral nephrolithiasis. 5 mm obstructing stone in the mid/distal left ureter with moderate left hydroureteronephrosis, as before. Right-sided nephroureteral stent in place. Moderate right hydroureteronephrosis which appears intervally improved when compared to the prior. Postsurgical changes as described with right-sided ileostomy. No discrete evidence of bowel obstruction. Several subcentimeter pulmonary nodules are redemonstrated, as before, suspicious for pulmonary metastatic disease. Better described on recent prior CT of the chest, abdomen, and pelvis. Other findings as above.               Workstation performed: [id number]

## 2023-12-15 NOTE — PROGRESS NOTES
Today Danielle there went exchange of her right stent and left ureteroscopy.  I remove the stone in her left ureter.  The patient is cleared for discharge from urologic standpoint on the afternoon of December 15, 2023 as long as vital signs are stable and she remains afebrile and can tolerate p.o.  Outpatient follow-up recommended with Dr. Stephens.  Would discharge home with 3 days of oral Keflex and 5 tablets of Norco.

## 2023-12-15 NOTE — ASSESSMENT & PLAN NOTE
Bilateral nephrolithiasis. 5 mm obstructing stone in the mid/distal left ureter with moderate left hydroureteronephrosis, as before.   Patient presented to ED with worsening pelvic pain  Underlying history of ureteral strictures with indwelling right ureteral stent  Abdomen pelvis CT scan with bilateral nephrolithiasis. 5 mm obstructing stone in the mid/distal left ureter with moderate left hydroureteronephrosis, as before. right-sided nephroureteral stent in place. Moderate right hydroureteronephrosis    Patient was evaluated by urology  She underwent cystoscopy, bilateral retrograde pyelograms, exchange of right ureteral stent, left ureteroscopy with holmium laser lithotripsy and placement of left ureteral stent.     Continue with IV fluid and pain control     I have reviewed and confirmed nurses' notes for patient's medications, allergies, medical history, and surgical history.

## 2023-12-15 NOTE — ANESTHESIA POSTPROCEDURE EVALUATION
Post-Op Assessment Note    CV Status:  Stable  Pain Score: 0    Pain management: adequate       Mental Status:  Awake   Hydration Status:  Euvolemic   PONV Controlled:  Controlled   Airway Patency:  Patent     Post Op Vitals Reviewed: Yes      Staff: CRNA               BP   166/80   Temp      Pulse  90   Resp      SpO2   98 on 4LNC

## 2023-12-15 NOTE — TELEPHONE ENCOUNTER
MPRESSION:     Bilateral nephrolithiasis. 5 mm obstructing stone in the mid/distal left ureter with moderate left hydroureteronephrosis, as before. Right-sided nephroureteral stent in place. Moderate right hydroureteronephrosis which appears intervally improved when compared to the prior. Postsurgical changes as described with right-sided ileostomy. No discrete evidence of bowel obstruction. Several subcentimeter pulmonary nodules are redemonstrated, as before, suspicious for pulmonary metastatic disease. Better described on recent prior CT of the chest, abdomen, and pelvis. Other findings as above.          Please review and advise

## 2023-12-15 NOTE — ASSESSMENT & PLAN NOTE
Abnormal urinalysis consistent with UTI  Patient is afebrile without leukocytosis  Currently on Keflex per urology  Follow on urine culture

## 2023-12-15 NOTE — CONSULTS
Consult - Urology   Gia Brenden 1965, 66 y.o. female MRN: [id number]    Unit/Bed#: ED-16 Encounter: [id number]    Ureteral calculi  Assessment & Plan  CT: New moderate to severe left-sided hydroureteronephrosis to the level of 6 mm obstructing distal ureteral calculus   WBC 8.97  Creat 0.96  Afebrile, vital signs stable, nontoxic  Transfer to [address] [place] [address] and admit to medical service  N.p.o. after midnight  Case requested for left stent placement and possible right stent exchange on 12/15    Acute cystitis with hematuria  Assessment & Plan  WBC 8.97  Positive pyuria  Urine culture pending  Afebrile, vital signs stable  Ceftriaxone given in emergency department  Empiric antibiotics tailored to culture  Medical optimization per primary team    Hydronephrosis due to ureteral stricture  Assessment & Plan  S/p right ureteral stent exchange 10/11  CT with moderate to severe right-sided hydroureteronephrosis with stent in stable position felt to be encrusted proximally  Creat 0.96  Transfer to [address] [place] [address] to [facility]   N.p.o. after midnight for possible right stent exchange as well as left stent placement 12/15          Subjective:   62-year-old female advanced colorectal cancer status post surgical resection prior pelvic radiation with colostomy bag. Has history of complex bilateral ureteral and intrarenal calculi with known right proximal ureteral stricture. Most recently underwent cystoscopy with right retrograde pyelogram right ureteroscopy and right ureteral stent exchange with left ureteral stent removal on 10/11/2023. CT imaging completed 12/11/2023 revealed New moderate to severe left-sided hydroureteronephrosis to the level of a 6 mm obstructing distal ureteral calculus. New moderate to severe right-sided hydroureteronephrosis to the level of the proximal ureter with stably positioned indwelling ureteral stent, the proximal portion of which is probably encrusted, similar to prior.   She reports having abdominal pain and feels like she is being "sliced inside". She also reports having flank pain a couple of days ago. He has been having urinary frequency, urgency and dysuria. Urine testing in the emergency room reveals pyuria with WBCs of 8.97. She denies fevers or chills has intermittent hematuria. Review of Systems   Constitutional:  Negative for activity change, appetite change, chills, fatigue, fever and unexpected weight change. HENT:  Negative for facial swelling. Eyes:  Negative for discharge. Respiratory: Negative. Negative for cough and shortness of breath. Cardiovascular:  Negative for chest pain and leg swelling. Gastrointestinal:  Positive for abdominal pain. Negative for abdominal distention, constipation, diarrhea, nausea and vomiting. Endocrine: Negative. Genitourinary:  Positive for difficulty urinating, dysuria, flank pain, frequency, hematuria, urgency and vaginal pain. Negative for decreased urine volume, enuresis and genital sores. Musculoskeletal:  Negative for back pain and myalgias. Skin:  Negative for pallor and rash. Allergic/Immunologic: Negative. Negative for immunocompromised state. Neurological:  Negative for facial asymmetry and speech difficulty. Psychiatric/Behavioral:  Negative for agitation and confusion. Objective:  Vitals: Blood pressure 143/69, pulse 74, temperature 98.3 °F (36.8 °C), temperature source Oral, resp. rate 18, height 5' 2" (1.575 m), SpO2 98%. ,Body mass index is 26.7 kg/m². Physical Exam  Vitals and nursing note reviewed. Constitutional:       General: She is not in acute distress. Appearance: Normal appearance. She is not ill-appearing, toxic-appearing or diaphoretic. HENT:      Head: Normocephalic. Cardiovascular:      Rate and Rhythm: Normal rate. Pulmonary:      Effort: Pulmonary effort is normal. No respiratory distress. Abdominal:      General: Abdomen is flat.  There is no distension. Palpations: Abdomen is soft. Tenderness: There is no abdominal tenderness. There is no right CVA tenderness, left CVA tenderness, guarding or rebound. Comments: Colostomy bag   Musculoskeletal:         General: No swelling. Cervical back: Normal range of motion. Skin:     General: Skin is warm and dry. Neurological:      General: No focal deficit present. Mental Status: She is alert and oriented to person, place, and time. Psychiatric:         Mood and Affect: Mood normal.         Behavior: Behavior normal.         Thought Content: Thought content normal.         Judgment: Judgment normal.         Imaging:  CT CHEST, ABDOMEN AND PELVIS WITH IV CONTRAST     INDICATION:   C20: Malignant neoplasm of rectum. COMPARISON: CT abdomen/pelvis dated 8/23/2023. CT chest/abdomen/pelvis dated 6/15/2023. TECHNIQUE: CT examination of the chest, abdomen and pelvis was performed. Multiplanar 2D reformatted images were created from the source data. This examination, like all CT scans performed in the [facility], was performed utilizing techniques to minimize radiation dose exposure, including the use of iterative reconstruction and automated exposure control. Radiation dose length   product (DLP) for this visit:  914 mGy-cm     IV Contrast:  100 mL of iohexol (OMNIPAQUE)     Enteric Contrast: Enteric contrast was administered. FINDINGS:     CHEST     LUNGS: There are multiple new/enlarging pulmonary nodules worrisome for metastatic disease. Index lesions are as follows:  1.5 x 1.3 cm spiculated left upper lobe nodule (image 138, series 4), previously 1.2 x 0.8 cm  3 mm cavitary right lower lobe nodule (image 141, series 4), new  6 mm cavitary right lower lobe nodule (image 1666, series 4), new  4 mm cavitary left lower lobe nodule (image 168, series 4), new  There is no tracheal or endobronchial lesion. PLEURA:  Unremarkable.      HEART/GREAT VESSELS: Heart is unremarkable for patient's age. No thoracic aortic aneurysm. MEDIASTINUM AND DUSTY:  Unremarkable. CHEST WALL AND LOWER NECK: Mild nodularity of the right thyroid lobe, similar to prior. Correlate with thyroid ultrasound findings/recommendations dated 3/25/2023. ABDOMEN     LIVER/BILIARY TREE:  Unremarkable. GALLBLADDER:  No calcified gallstones. No pericholecystic inflammatory change. SPLEEN: The spleen is top normal in size measuring up to 12 cm and is mildly heterogeneous with some nodularity, similar to prior. This is likely related to the patient's known history of sarcoidosis. PANCREAS:  Unremarkable. ADRENAL GLANDS: Stable 7 mm right adrenal gland nodule (image 110, series 2). KIDNEYS/URETERS: There is new moderate to severe right-sided hydroureteronephrosis to the level of the proximal ureter with stably positioned indwelling ureteral stent, the proximal portion of which is probably encrusted, similar to prior. There is also new moderate to severe left-sided hydroureteronephrosis to the level of a 6 mm obstructing calculus in the distal ureter (image 178, series 2). The left ureteral stent has been removed in the interim. Multiple nonobstructing bilateral   intrarenal calculi measuring up to 6 mm on the left. STOMACH AND BOWEL: Stable postsurgical changes of low anterior resection of the colon. Right mid anterior abdominal ileostomy. APPENDIX:  No findings to suggest appendicitis. ABDOMINOPELVIC CAVITY:  No ascites. No pneumoperitoneum. Mildly prominent retroperitoneal lymph nodes are similar to prior, nonspecific. VESSELS:  Unremarkable for patient's age. PELVIS     REPRODUCTIVE ORGANS:  Unremarkable for patient's age. URINARY BLADDER:  Unremarkable. ABDOMINAL WALL/INGUINAL REGIONS:  Unremarkable. OSSEOUS STRUCTURES:  No acute fracture or destructive osseous lesion.      IMPRESSION:     New moderate to severe left-sided hydroureteronephrosis to the level of a 6 mm obstructing distal ureteral calculus. The left ureteral stent has been removed in the interim. New moderate to severe right-sided hydroureteronephrosis to the level of the proximal ureter with stably positioned indwelling ureteral stent, the proximal portion of which is probably encrusted, similar to prior. Bilateral nephrolithiasis. Findings highly worrisome for pulmonary metastatic disease as evidenced by interval development of multiple new/enlarging pulmonary nodules, many of which are cavitary, in this patient with history of rectal adenocarcinoma. Cavitary pulmonary sarcoidosis   is considered less likely as it is very uncommon and is usually reported in those with severe and active disease. Spleen remains top normal in size with heterogeneous appearance and some degree of nodularity, similar to prior, likely related to the patient's known history of sarcoidosis. The study was marked in [facility] for immediate notification. Workstation performed: [id number]  Imaging reviewed - both report and images personally reviewed.      Labs:  Recent Labs     12/14/23  1844   WBC 8.97     Recent Labs     12/14/23  1844   HGB 11.3*       Recent Labs     12/14/23  1844   CREATININE 0.96       Microbiology:  Pending    History:  Social History     Socioeconomic History    Marital status:      Spouse name: None    Number of children: None    Years of education: None    Highest education level: None   Occupational History    None   Tobacco Use    Smoking status: Never     Passive exposure: Past    Smokeless tobacco: Never   Vaping Use    Vaping status: Never Used   Substance and Sexual Activity    Alcohol use: Never    Drug use: Never    Sexual activity: Not Currently   Other Topics Concern    None   Social History Narrative    From 4/14/23  note:    Emergency Contact: Elif Haynes (ex-) [phone] (Mobile)    Marital Status:     Caregiver/Support: ex- -Claudette Alpers and two dtrs. Children: two dtrs- nataliia 21 in [facility] and [place] [facility]    Child/Elder care: no     Housing: two story house    Home Setup: one level    Lives With:  -anay and two dtrs    Daily Living Activities: independent    Durable Medical Equipment: No    Ambulation: independent    Employment: disabled-SSI-$100 and pension-$700    [place] Status/Location: no     Ability to pay bills: yes. No barriers to paying monthly bills. POA/LW/AD: no.  - Claudette Alpers is healthcare representative. MSW emailed advance directive. Social Determinants of Health     Financial Resource Strain: Low Risk  (11/30/2022)    Received from [address] Strain     In the last 12 months, did you worry that your food could run out before you got money to buy more? : No   Food Insecurity: No Food Insecurity (8/18/2023)    Hunger Vital Sign     Worried About Running Out of Food in the Last Year: Never true     Ran Out of Food in the Last Year: Never true   Transportation Needs: No Transportation Needs (8/18/2023)    PRAPARE - Transportation     Lack of Transportation (Medical): No     Lack of Transportation (Non-Medical):  No   Physical Activity: Not on file   Stress: Not on file   Social Connections: Not on file   Intimate Partner Violence: Low Risk  (11/30/2022)    Received from [address]     Does anyone in your life hurt you, threaten you, frighten you or make you feel unsafe?: No   Housing Stability: Low Risk  (8/18/2023)    Housing Stability Vital Sign     Unable to Pay for Housing in the Last Year: No     Number of Places Lived in the Last Year: 1     Unstable Housing in the Last Year: No       Past Medical History:   Diagnosis Date    Cancer [facility])     Colon cancer ([address])     Fall     Headache     Hemochromatosis, unspecified     History of transfusion     2022 - no adverse reaction    Hypertension     Kidney calculi     Kidney stone     Liver disease     hemangioma    Muscle weakness     Personal history of COVID-19 12/2022    Sarcoidosis     Seizures ([address])     2022    Shingles      Past Surgical History:   Procedure Laterality Date    ABSCESS DRAINAGE      left breast    BREAST BIOPSY      CHEST TUBE INSERTION      COLON SURGERY      colostomy    COLONOSCOPY      FL GUIDED CENTRAL VENOUS ACCESS DEVICE INSERTION  03/21/2023    FL RETROGRADE PYELOGRAM  01/23/2023    FL RETROGRADE PYELOGRAM  04/03/2023    FL RETROGRADE PYELOGRAM  7/21/2023    FL RETROGRADE PYELOGRAM  10/11/2023    IR BIOPSY LIVER MASS  05/09/2023    LUNG BIOPSY      DC CYSTO BLADDER W/URETERAL CATHETERIZATION Bilateral 07/21/2023    Procedure: CYSTOSCOPY URETEROSCOPY RETROGRADE PYELOGRAM WITH BILATERAL STENT URETERAL EXCHANGE; LEFT LASER LITHOTRIPSY AND STONE BASKET RETRIEVAL;  Surgeon: Kiah Damon MD;  [facility];  Service: Urology    DC CYSTO/URETERO W/LITHOTRIPSY &INDWELL STENT INSRT Bilateral 01/23/2023    Procedure: CYSTOSCOPY  RIGHT URETEROSCOPY WITH LITHOTRIPSY HOLMIUM LASER, BILATERAL RETROGRADE PYELOGRAM AND BILATERAL  URETERAL STENT INSERTION;  Surgeon: Kiah Damon MD;  [facility];  Service: Urology    DC CYSTO/URETERO W/LITHOTRIPSY &INDWELL STENT INSRT Right 04/03/2023    Procedure: RIGHT URETEROSCOPY WITH LITHOTRIPSY HOLMIUM LASER, RETROGRADE PYELOGRAM; BILATERAL EXCHANGE STENT URETERAL;  Surgeon: Kiah Damon MD;  [facility];  Service: Urology    DC CYSTO/URETERO W/LITHOTRIPSY &INDWELL STENT INSRT Bilateral 10/11/2023    Procedure: CYSTOSCOPY URETEROSCOPY RETROGRADE PYELOGRAM AND INSERTION STENT URETERAL [facility] RIGHT URETEROSCOPY, REMOVAL STENT LEFT SIDE;  Surgeon: Kiah Damon MD;  [facility];  Service: Urology    DC INSJ TUNNELED CTR VAD W/SUBQ PORT AGE 5 YR/> N/A 03/21/2023    Procedure: INSERTION VENOUS PORT ( PORT-A-CATH) IR;  Surgeon: Brandon Lim DO;  [facility];  Service: Interventional Radiology    UT LAPS COLECTOMY PRTL W/COLOPXTSTMY LW ANAST N/A 8/17/2023    Procedure: RESECTION COLON LOW ANTERIOR LAPAROSCOPIC WITH ROBOTICS;  Surgeon: Sherry Gallagher MD;  [facility];  Service: Colorectal    TONSILLECTOMY      URINARY SURGERY Bilateral     bilateral stents     Family History   Problem Relation Age of Onset    Cancer Mother     Cirrhosis Father     Breast cancer Neg Hx     Breast cancer additional onset Neg Hx        The following portions of the patient's history were reviewed and updated as appropriate: allergies, current medications, past family history, past medical history, past social history, past surgical history and problem list    ANTHONY Son  Date: 12/14/2023 Time: 10:51 PM

## 2023-12-15 NOTE — CONSULTS
UROLOGY CONSULTATION NOTE     Patient Identifiers: Danielle Haque (MRN [id number])  Service Requesting Consultation: [facility]  Service Providing Consultation:  Urology, Derick Bhakta MD    Date of Service: 12/15/2023    Reason for Consultation: History of right ureteral stricture status post right stent, new left hydronephrosis secondary to 6 mm distal ureteral calculus    History of Present Illness:     Danielle Haque is a 58 y.o. with a history of rectal cancer known to Dr. Umanzor.  She also follows with Dr. Al Ferrera for her T3 N0 M0 disease.  The patient had chemo/radiation therapy and then underwent resection revealing T2 disease.  She is known to Dr. Stephens with a right ureteral stricture managed with a right ureteral stent.  She also had a left ureteral stent which was recently removed and now she presents with left-sided flank pain.  A CT scan shows new hydronephrosis secondary to 6 mm distal left ureteral calculus.  In addition on her most recent CT scan there is worsening of her pulmonary nodules.  She follows with pulmonary and has a history of sarcoidosis with lung and liver involvement.  She is now transferred [facility] to [facility] for involvement by urology for exchange of a right ureteral stent along with left ureteroscopy with placement of a new left ureteral stent.    Past Medical, Past Surgical History:     Past Medical History:   Diagnosis Date    Cancer (HCC)     Colon cancer (HCC)     Fall     Headache     Hemochromatosis, unspecified     History of transfusion     2022 - no adverse reaction    Hypertension     Kidney calculi     Kidney stone     Liver disease     hemangioma    Muscle weakness     Personal history of COVID-19 12/2022    Sarcoidosis     Seizures (HCC)     2022    Shingles      Past Surgical History:   Procedure Laterality Date    ABSCESS DRAINAGE      left breast    BREAST BIOPSY      CHEST TUBE INSERTION      COLON SURGERY      colostomy     COLONOSCOPY      FL GUIDED CENTRAL VENOUS ACCESS DEVICE INSERTION  03/21/2023    FL RETROGRADE PYELOGRAM  01/23/2023    FL RETROGRADE PYELOGRAM  04/03/2023    FL RETROGRADE PYELOGRAM  7/21/2023    FL RETROGRADE PYELOGRAM  10/11/2023    IR BIOPSY LIVER MASS  05/09/2023    LUNG BIOPSY      RI CYSTO BLADDER W/URETERAL CATHETERIZATION Bilateral 07/21/2023    Procedure: CYSTOSCOPY URETEROSCOPY RETROGRADE PYELOGRAM WITH BILATERAL STENT URETERAL EXCHANGE; LEFT LASER LITHOTRIPSY AND STONE BASKET RETRIEVAL;  Surgeon: José Luis Stephens MD;  [facility];  Service: Urology    RI CYSTO/URETERO W/LITHOTRIPSY &INDWELL STENT INSRT Bilateral 01/23/2023    Procedure: CYSTOSCOPY  RIGHT URETEROSCOPY WITH LITHOTRIPSY HOLMIUM LASER, BILATERAL RETROGRADE PYELOGRAM AND BILATERAL  URETERAL STENT INSERTION;  Surgeon: José Luis Stephens MD;  [facility];  Service: Urology    RI CYSTO/URETERO W/LITHOTRIPSY &INDWELL STENT INSRT Right 04/03/2023    Procedure: RIGHT URETEROSCOPY WITH LITHOTRIPSY HOLMIUM LASER, RETROGRADE PYELOGRAM; BILATERAL EXCHANGE STENT URETERAL;  Surgeon: José Luis Stephens MD;  [facility];  Service: Urology    RI CYSTO/URETERO W/LITHOTRIPSY &INDWELL STENT INSRT Bilateral 10/11/2023    Procedure: CYSTOSCOPY URETEROSCOPY RETROGRADE PYELOGRAM AND INSERTION STENT URETERAL DIAGNOSTINC RIGHT URETEROSCOPY, REMOVAL STENT LEFT SIDE;  Surgeon: José Luis Stephens MD;  [facility];  Service: Urology    RI INSJ TUNNELED CTR VAD W/SUBQ PORT AGE 5 YR/> N/A 03/21/2023    Procedure: INSERTION VENOUS PORT ( PORT-A-CATH) IR;  Surgeon: Mark Amos DO;  [facility];  Service: Interventional Radiology    RI LAPS COLECTOMY PRTL W/COLOPXTSTMY LW ANAST N/A 8/17/2023    Procedure: RESECTION COLON LOW ANTERIOR LAPAROSCOPIC WITH ROBOTICS;  Surgeon: Sree Umanzor MD;  [facility];  Service: Colorectal    TONSILLECTOMY      URINARY SURGERY Bilateral     bilateral  "stents      Medications, Allergies:   Scheduled Meds:  Continuous Infusions:No current facility-administered medications for this encounter.    PRN Meds:.    Allergies:  Allergies   Allergen Reactions    Oxaliplatin Shortness Of Breath     Reactions occurred with 2nd and 3rd infusions (about 1-3 hours from initiation of infusion) and required treatment with steroids and antihistamines. Please refer to allergy note on 2/7/2023 for detailed description of her reactions.    Morphine Nausea Only     \"Every dose made me nauseous\" (oral dosing only, can tolerate IV morphine)   :    Social and Family History:   Social History:   Social History     Tobacco Use    Smoking status: Never     Passive exposure: Past    Smokeless tobacco: Never   Vaping Use    Vaping status: Never Used   Substance Use Topics    Alcohol use: Never    Drug use: Never   .    Social History     Tobacco Use   Smoking Status Never    Passive exposure: Past   Smokeless Tobacco Never       Family History:  Family History   Problem Relation Age of Onset    Cancer Mother     Cirrhosis Father     Breast cancer Neg Hx     Breast cancer additional onset Neg Hx    :     Review of Systems:   Left flank pain.  She denies any chest pain or shortness of breath  All other systems queried were negative.    Physical Exam:   There were no vitals filed for this visit.    PE:  General: Awake alert and oriented no acute distress, non-ill nontoxic  HEENT: Normocephalic/atraumatic, sclera anicteric  Cardiac: Regular  Pulmonary no distress  Abdomen: Soft nontender nondistended  Skin: Warm  Extremities: Without edema  Neurologic: Grossly intact and nonfocal    Labs:     Lab Results   Component Value Date    HGB 11.3 (L) 12/14/2023    HCT 36.6 12/14/2023    WBC 8.97 12/14/2023     12/14/2023   ]    Lab Results   Component Value Date    K 4.6 12/14/2023     12/14/2023    CO2 23 12/14/2023    BUN 33 (H) 12/14/2023    CREATININE 0.96 12/14/2023    CALCIUM 10.2 " 12/14/2023   ]    Imaging:   CT scan reviewed reveals right stent in place with right-sided hydronephrosis and obstructing mid left ureteral calculus with left-sided hydronephrosis.  Lung findings as per radiology noted as well    ASSESSMENT:     58 y.o. old female with ureteral stricture managed by Dr. Stephens with indwelling right ureteral stent, new left ureteral calculus with left-sided hydronephrosis, history of advanced colorectal cancer, status post chemoradiation and surgery, enlarging cavitary lung nodules (sarcoid versus possible metastasis)    PLAN:     Cystoscopy with bilateral retrogrades and exchange of the right ureteral stent.  I will also perform left ureteroscopy with holmium laser lithotripsy, left retrograde pyelography and left ureteral stent insertion.  I will place both stents without a string as the patient will require follow-up with Dr. Stephens for her chronic ureteral stricture disease.  I have reached out to oncology, pulmonology, and colorectal surgery regarding the CT scan lung findings based on the patient's prior history.    Thank you for allowing me to participate in this patients’ care.  Please do not hesitate to call with any additional questions.  Derick Bhakta MD

## 2023-12-15 NOTE — ASSESSMENT & PLAN NOTE
CT abdomen pelvis with several subcentimeter pulmonary nodules are redemonstrated, as before, suspicious for pulmonary metastatic disease. Better described on recent prior CT of the chest, abdomen, and pelvis.   Outpatient oncology follow-up

## 2023-12-15 NOTE — ED NOTES
Transfer Information:   Ambulance Squad: Malielijah Zamora Time: 1100   Destination: [facility] pre-op holding   Accepting Physician: Lionel Yi   Report Number: [phone]     Will make primary RN aware via tiger text     April RODERICK Francois RN  12/15/23 0887

## 2023-12-15 NOTE — TELEPHONE ENCOUNTER
Patient admitted at [facility], undergoing cystoscopy, bilateral retrograde, exchange of right ureteral stent. Left ureteroscopy for stone. She will continue to follow-up with Dr. Alli Brown for chronic ureteral stricture, routine stent exchanges.  Dr. Aliza Green reached out to oncology, pulmonology, colorectal surgery regarding CT scan

## 2023-12-15 NOTE — ED PROVIDER NOTES
History  Chief Complaint   Patient presents with    Difficulty Urinating     Pt states burning/pressure with urinating for the last few days, had CT done for other outpt appt and told her she had swelling in kidneys, +hematuria +nausea, denies back pain or fevers      Patient is a 28-year-old female with a history of colon cancer with colostomy, pretension, kidney stones, sarcoidosis, seizures, history of indwelling bilateral insertion of ureteral stents that presents to [facility] with acute persistent worsening pelvic pain for 2 days. Patient resents with her  this evening that provides part of patient history. Patient with recent CT chest abdomen pelvis without contrast CT impression-"New moderate to severe left-sided hydroureteronephrosis to the level of a 6 mm obstructing distal ureteral calculus. The left ureteral stent has been removed in the interim. New moderate to severe right-sided hydroureteronephrosis to the level of the proximal ureter with stably positioned indwelling ureteral stent, the proximal portion of which is probably encrusted, similar to prior. Bilateral nephrolithiasis. ..." Patient denies vaginal bleeding and vaginal discharge. Patient denies use antibiotic use. Patient denies out of factors with provocative factors of pressure to bilateral flank regions and urination. Patient denies sick contacts and recent travel. Patient denies recent fall and recent trauma. Patient denies chest pain and shortness of breath. History provided by:  Patient   used: No    Difficulty Urinating  Associated symptoms: flank pain    Associated symptoms: no abdominal pain, no fever, no nausea and no vomiting        Prior to Admission Medications   Prescriptions Last Dose Informant Patient Reported?  Taking?   acetaminophen (TYLENOL) 500 mg tablet   No No   Sig: Take 2 tablets (1,000 mg total) by mouth 3 (three) times a day   amLODIPine (NORVASC) 10 mg tablet  Self No No   Sig: Take 1 tablet (10 mg total) by mouth daily   Patient taking differently: Take 20 mg by mouth daily   cyanocobalamin (VITAMIN B-12) 1000 MCG tablet  Self Yes No   Sig: Take 1,000 mcg by mouth daily   doxycycline monohydrate (MONODOX) 100 mg capsule   No No   Sig: Take 1 pill twice daily with food for 2 weeks   ondansetron (ZOFRAN) 4 mg tablet  Self No No   Sig: Take 1 tablet (4 mg total) by mouth every 8 (eight) hours as needed for nausea or vomiting   Patient not taking: Reported on 9/29/2023   oxyCODONE (Roxicodone) 5 immediate release tablet  Self No No   Sig: Take 1 tablet (5 mg total) by mouth every 4 (four) hours as needed for moderate pain or severe pain Max Daily Amount: 30 mg   oxybutynin (DITROPAN) 5 mg tablet   No No   Sig: TAKE 1 TABLET (5 MG TOTAL) BY MOUTH EVERY 6 (SIX) HOURS AS NEEDED (BLADDER SPASMS)   potassium chloride (MICRO-K) 10 MEQ CR capsule   No No   Sig: Take 4 capsules (40 mEq total) by mouth 2 (two) times a day   predniSONE 10 mg tablet  Self No No   Sig: Take 1 tablet (10 mg total) by mouth daily Please take for 4 weeks and then decrease by 5 mg increments every 2 weeks.  -- Taking 20mg and then 10mg - titrating   ursodiol (ACTIGALL) 300 mg capsule  Self No No   Sig: Take 3 capsules (900 mg total) by mouth in the morning      Facility-Administered Medications: None       Past Medical History:   Diagnosis Date    Cancer ([address])     Colon cancer ([address])     Fall     Headache     Hemochromatosis, unspecified     History of transfusion     2022 - no adverse reaction    Hypertension     Kidney calculi     Kidney stone     Liver disease     hemangioma    Muscle weakness     Personal history of COVID-19 12/2022    Sarcoidosis     Seizures ([address])     2022    Shingles        Past Surgical History:   Procedure Laterality Date    ABSCESS DRAINAGE      left breast    BREAST BIOPSY      CHEST TUBE INSERTION      COLON SURGERY      colostomy    COLONOSCOPY      FL GUIDED CENTRAL VENOUS ACCESS DEVICE INSERTION 03/21/2023    FL RETROGRADE PYELOGRAM  01/23/2023    FL RETROGRADE PYELOGRAM  04/03/2023    FL RETROGRADE PYELOGRAM  7/21/2023    FL RETROGRADE PYELOGRAM  10/11/2023    IR BIOPSY LIVER MASS  05/09/2023    LUNG BIOPSY      WI CYSTO BLADDER W/URETERAL CATHETERIZATION Bilateral 07/21/2023    Procedure: CYSTOSCOPY URETEROSCOPY RETROGRADE PYELOGRAM WITH BILATERAL STENT URETERAL EXCHANGE; LEFT LASER LITHOTRIPSY AND STONE BASKET RETRIEVAL;  Surgeon: Randee Evangelista MD;  [facility];  Service: Urology    WI CYSTO/URETERO W/LITHOTRIPSY &INDWELL STENT INSRT Bilateral 01/23/2023    Procedure: CYSTOSCOPY  RIGHT URETEROSCOPY WITH LITHOTRIPSY HOLMIUM LASER, BILATERAL RETROGRADE PYELOGRAM AND BILATERAL  URETERAL STENT INSERTION;  Surgeon: Randee Evangelista MD;  [facility];  Service: Urology    WI CYSTO/URETERO W/LITHOTRIPSY &INDWELL STENT INSRT Right 04/03/2023    Procedure: RIGHT URETEROSCOPY WITH LITHOTRIPSY HOLMIUM LASER, RETROGRADE PYELOGRAM; BILATERAL EXCHANGE STENT URETERAL;  Surgeon: Randee Evangelista MD;  [facility];  Service: Urology    WI CYSTO/URETERO W/LITHOTRIPSY &INDWELL STENT INSRT Bilateral 10/11/2023    Procedure: CYSTOSCOPY URETEROSCOPY RETROGRADE PYELOGRAM AND INSERTION STENT URETERAL [facility] RIGHT URETEROSCOPY, REMOVAL STENT LEFT SIDE;  Surgeon: Randee Evangelista MD;  [facility];  Service: Urology    WI INSJ TUNNELED CTR VAD W/SUBQ PORT AGE 5 YR/> N/A 03/21/2023    Procedure: INSERTION VENOUS PORT ( PORT-A-CATH) IR;  Surgeon: Morenita Mancia DO;  [facility];  Service: Interventional Radiology    WI LAPS COLECTOMY PRTL W/COLOPXTSTMY LW ANAST N/A 8/17/2023    Procedure: RESECTION COLON LOW ANTERIOR LAPAROSCOPIC WITH ROBOTICS;  Surgeon: Josiah Goetz MD;  [facility];  Service: Colorectal    TONSILLECTOMY      URINARY SURGERY Bilateral     bilateral stents       Family History   Problem Relation Age of Onset    Cancer Mother     Cirrhosis Father     Breast cancer Neg Hx     Breast cancer additional onset Neg Hx      I have reviewed and agree with the history as documented. E-Cigarette/Vaping    E-Cigarette Use Never User      E-Cigarette/Vaping Substances    Nicotine No     THC No     CBD No     Flavoring No     Other No     Unknown No      Social History     Tobacco Use    Smoking status: Never     Passive exposure: Past    Smokeless tobacco: Never   Vaping Use    Vaping status: Never Used   Substance Use Topics    Alcohol use: Never    Drug use: Never       Review of Systems   Constitutional:  Negative for activity change, appetite change, chills and fever. HENT:  Negative for congestion, postnasal drip, rhinorrhea, sinus pressure, sinus pain, sore throat and tinnitus. Eyes:  Negative for photophobia and visual disturbance. Respiratory:  Negative for cough, chest tightness and shortness of breath. Cardiovascular:  Negative for chest pain and palpitations. Gastrointestinal:  Negative for abdominal pain, constipation, diarrhea, nausea and vomiting. Genitourinary:  Positive for dysuria and flank pain. Negative for difficulty urinating, frequency and urgency. Musculoskeletal:  Negative for back pain, gait problem, neck pain and neck stiffness. Skin:  Negative for pallor and rash. Allergic/Immunologic: Negative for environmental allergies and food allergies. Neurological:  Negative for dizziness, weakness, numbness and headaches. Psychiatric/Behavioral:  Negative for confusion. All other systems reviewed and are negative. Physical Exam  Physical Exam  Vitals and nursing note reviewed. Constitutional:       General: She is awake. Appearance: Normal appearance. She is well-developed and normal weight. She is not ill-appearing, toxic-appearing or diaphoretic.       Comments: /69 (BP Location: Left arm)   Pulse 74   Temp 98.3 °F (36.8 °C) (Oral)   Resp 18   Ht 5' 2" (1.575 m)   SpO2 98%   BMI 26.70 kg/m²      HENT:      Head: Normocephalic and atraumatic. Jaw: There is normal jaw occlusion. Right Ear: Hearing, tympanic membrane and external ear normal. No decreased hearing noted. No drainage, swelling or tenderness. No mastoid tenderness. Left Ear: Hearing, tympanic membrane and external ear normal. No decreased hearing noted. No drainage, swelling or tenderness. No mastoid tenderness. Nose: Nose normal.      Mouth/Throat:      Lips: Pink. Mouth: Mucous membranes are moist.      Pharynx: Oropharynx is clear. Uvula midline. Eyes:      General: Lids are normal. Vision grossly intact. Gaze aligned appropriately. Right eye: No discharge. Left eye: No discharge. Extraocular Movements: Extraocular movements intact. Conjunctiva/sclera: Conjunctivae normal.      Pupils: Pupils are equal, round, and reactive to light. Neck:      Vascular: No JVD. Trachea: Trachea and phonation normal. No tracheal tenderness or tracheal deviation. Cardiovascular:      Rate and Rhythm: Normal rate and regular rhythm. Pulses: Normal pulses. Radial pulses are 2+ on the right side and 2+ on the left side. Posterior tibial pulses are 2+ on the right side and 2+ on the left side. Heart sounds: Normal heart sounds. Pulmonary:      Effort: Pulmonary effort is normal.      Breath sounds: Normal breath sounds and air entry. No stridor. No decreased breath sounds, wheezing, rhonchi or rales. Chest:      Chest wall: No tenderness. Abdominal:      General: Abdomen is flat. The ostomy site is clean. Bowel sounds are normal. There is no distension. Palpations: Abdomen is soft. Abdomen is not rigid. Tenderness: There is abdominal tenderness. There is right CVA tenderness and left CVA tenderness. There is no guarding or rebound. Musculoskeletal:         General: Normal range of motion.       Cervical back: Full passive range of motion without pain, normal range of motion and neck supple. No rigidity. No spinous process tenderness or muscular tenderness. Normal range of motion. Feet:      Right foot:      Toenail Condition: Right toenails are normal.      Left foot:      Toenail Condition: Left toenails are normal.   Lymphadenopathy:      Head:      Right side of head: No submental, submandibular, tonsillar, preauricular, posterior auricular or occipital adenopathy. Left side of head: No submental, submandibular, tonsillar, preauricular, posterior auricular or occipital adenopathy. Cervical: No cervical adenopathy. Right cervical: No superficial, deep or posterior cervical adenopathy. Left cervical: No superficial, deep or posterior cervical adenopathy. Skin:     General: Skin is warm. Capillary Refill: Capillary refill takes less than 2 seconds. Findings: No rash. Neurological:      General: No focal deficit present. Mental Status: She is alert and oriented to person, place, and time. Mental status is at baseline. GCS: GCS eye subscore is 4. GCS verbal subscore is 5. GCS motor subscore is 6. Sensory: No sensory deficit. Psychiatric:         Attention and Perception: Attention normal.         Mood and Affect: Mood normal.         Speech: Speech normal.         Behavior: Behavior normal. Behavior is cooperative. Thought Content:  Thought content normal.         Judgment: Judgment normal.         Vital Signs  ED Triage Vitals [12/14/23 1839]   Temperature Pulse Respirations Blood Pressure SpO2   98.3 °F (36.8 °C) 78 17 166/77 98 %      Temp Source Heart Rate Source Patient Position - Orthostatic VS BP Location FiO2 (%)   Oral Monitor Sitting Right arm --      Pain Score       4           Vitals:    12/14/23 1839 12/14/23 1928 12/15/23 0050   BP: 166/77 143/69 125/60   Pulse: 78 74 68   Patient Position - Orthostatic VS: Sitting Lying Lying         Visual Acuity      ED Medications  Medications   sodium chloride 0.9 % infusion (150 mL/hr Intravenous New Bag 12/14/23 2241)   ketorolac (TORADOL) injection 15 mg (has no administration in time range)   ketorolac (TORADOL) injection 15 mg (has no administration in time range)   ondansetron (ZOFRAN) injection 4 mg (has no administration in time range)   ceftriaxone (ROCEPHIN) 1 g/50 mL in dextrose IVPB (has no administration in time range)   HYDROmorphone HCl (DILAUDID) injection 0.2 mg (has no administration in time range)   ceftriaxone (ROCEPHIN) 1 g/50 mL in dextrose IVPB (0 mg Intravenous Stopped 12/14/23 2226)   sodium chloride 0.9 % bolus 1,000 mL (0 mL Intravenous Stopped 12/15/23 0014)   ketorolac (TORADOL) injection 15 mg (15 mg Intravenous Given 12/14/23 1959)       Diagnostic Studies  Results Reviewed       Procedure Component Value Units Date/Time    Blood culture #1 [[id number]] Collected: 12/15/23 0014    Lab Status: In process Specimen: Blood from Arm, Left Updated: 12/15/23 0021    Blood culture #2 [[id number]] Collected: 12/14/23 2240    Lab Status: In process Specimen: Blood from Arm, Right Updated: 12/14/23 2244    Lactic acid, plasma (w/reflex if result > 2.0) [[id number]]  (Normal) Collected: 12/14/23 1958    Lab Status: Final result Specimen: Blood from Arm, Right Updated: 12/14/23 2026     LACTIC ACID 1.2 mmol/L     Narrative:      Result may be elevated if tourniquet was used during collection.     CBC and differential [[id number]]  (Abnormal) Collected: 12/14/23 1844    Lab Status: Final result Specimen: Blood from Arm, Left Updated: 12/14/23 1957     WBC 8.97 Thousand/uL      RBC 4.51 Million/uL      Hemoglobin 11.3 g/dL      Hematocrit 36.6 %      MCV 81 fL      MCH 25.1 pg      MCHC 30.9 g/dL      RDW 15.1 %      MPV 9.1 fL      Platelets 823 Thousands/uL      nRBC 0 /100 WBCs      Neutrophils Relative 94 %      Immat GRANS % 0 %      Lymphocytes Relative 4 %      Monocytes Relative 2 %      Eosinophils Relative 0 %      Basophils Relative 0 %      Neutrophils Absolute 8.39 Thousands/µL      Immature Grans Absolute 0.04 Thousand/uL      Lymphocytes Absolute 0.32 Thousands/µL      Monocytes Absolute 0.20 Thousand/µL      Eosinophils Absolute 0.01 Thousand/µL      Basophils Absolute 0.01 Thousands/µL     Narrative: This is an appended report. These results have been appended to a previously verified report. Smear Review(Phlebs Do Not Order) [[id number]] Collected: 12/14/23 1844    Lab Status: Final result Specimen: Blood from Arm, Left Updated: 12/14/23 1957     Toxic Granulation Present     RBC Morphology Normal     Platelet Estimate Adequate    Urine Microscopic [[id number]]  (Abnormal) Collected: 12/14/23 1854    Lab Status: Final result Specimen: Urine, Clean Catch Updated: 12/14/23 1922     RBC, UA Innumerable /hpf      WBC, UA 20-30 /hpf      Epithelial Cells Occasional /hpf      Bacteria, UA Occasional /hpf      MUCUS THREADS Occasional    Urine culture [[id number]] Collected: 12/14/23 1854    Lab Status:  In process Specimen: Urine, Clean Catch Updated: 12/14/23 1922    Comprehensive metabolic panel [[id number]]  (Abnormal) Collected: 12/14/23 1844    Lab Status: Final result Specimen: Blood from Arm, Left Updated: 12/14/23 1916     Sodium 137 mmol/L      Potassium 4.6 mmol/L      Chloride 107 mmol/L      CO2 23 mmol/L      ANION GAP 7 mmol/L      BUN 33 mg/dL      Creatinine 0.96 mg/dL      Glucose 134 mg/dL      Calcium 10.2 mg/dL      AST 24 U/L      ALT 40 U/L      Alkaline Phosphatase 317 U/L      Total Protein 7.5 g/dL      Albumin 4.2 g/dL      Total Bilirubin 0.50 mg/dL      eGFR 66 ml/min/1.73sq m     Narrative:      [place] guidelines for Chronic Kidney Disease (CKD):     Stage 1 with normal or high GFR (GFR > 90 mL/min/1.73 square meters)    Stage 2 Mild CKD (GFR = 60-89 mL/min/1.73 square meters)    Stage 3A Moderate CKD (GFR = 45-59 mL/min/1.73 square meters)    Stage 3B Moderate CKD (GFR = 30-44 mL/min/1.73 square meters)    Stage 4 Severe CKD (GFR = 15-29 mL/min/1.73 square meters)    Stage 5 End Stage CKD (GFR <15 mL/min/1.73 square meters)  Note: GFR calculation is accurate only with a steady state creatinine    Lipase [[id number]]  (Normal) Collected: 12/14/23 1844    Lab Status: Final result Specimen: Blood from Arm, Left Updated: 12/14/23 1916     Lipase 38 u/L     UA w Reflex to Microscopic w Reflex to Culture [[id number]]  (Abnormal) Collected: 12/14/23 1854    Lab Status: Final result Specimen: Urine, Clean Catch Updated: 12/14/23 1911     Color, UA Dark Yellow     Clarity, UA Clear     Specific Gravity, UA 1.013     pH, UA 5.5     Leukocytes, UA Small     Nitrite, UA Positive     Protein, UA 30 (1+) mg/dl      Glucose, UA Negative mg/dl      Ketones, UA Negative mg/dl      Urobilinogen, UA <2.0 mg/dl      Bilirubin, UA Negative     Occult Blood, UA Large                   CT renal stone study abdomen pelvis without contrast   ED Interpretation by Natalie Bailey PA-C (12/14 2311)   Reading Physician Reading Date Result Priority  [place], [place]  [phone] 12/14/2023     Narrative & Impression  CT ABDOMEN AND PELVIS WITHOUT IV CONTRAST - LOW DOSE RENAL STONE     INDICATION:   pelvic pain. History of malignant neoplasm of the rectum     COMPARISON: CT chest, abdomen, and pelvis dated 12/11/2023     TECHNIQUE:  Low radiation dose thin section CT examination of the abdomen and pelvis was performed without intravenous or oral contrast according to a protocol specifically designed to evaluate for urinary tract calculus. Axial, sagittal, and coronal 2D   reformatted images were created from the source data and submitted for interpretation. Evaluation for pathology in the abdomen and pelvis that is unrelated to urinary tract calculi is limited. Radiation dose length product (DLP) for this visit:  131 mGy-cm .   This examination, like all CT scans performed in the [facility], was performed utilizing techniques to minimize radiation d   ose exposure, including the use of iterative   reconstruction and automated exposure control. URINARY TRACT FINDINGS:     RIGHT KIDNEY AND URETER: Multiple subcentimeter nonobstructing stones in the right kidney. Right-sided nephroureteral stent in place. Kadi stent calcifications along the proximal portion redemonstrated. There is moderate right hydroureteronephrosis, this   appears intervally improved when compared to the prior. Right kidney otherwise appears grossly unremarkable. LEFT KIDNEY AND URETER: Several subcentimeter nonobstructing stones in the left kidney. Again seen is an obstructing stone in the mid/distal left ureter which measures approximately 0.5 x 0.5 cm in size. There is moderate left hydroureteronephrosis   noted, similar to the prior. URINARY BLADDER: Right-sided nephroureteral stent terminates in the bladder; otherwise grossly unremarkable. ADDITIONAL FINDINGS:     LOWER CHEST: Scarring/atelectasis in the left lower lung field. Several subcen   timeter pulmonary nodules are redemonstrated, as before, better described on recent prior CT of the chest, abdomen, and pelvis. SOLID VISCERA: Benign-appearing subcentimeter nodule in the right adrenal gland. Limited low radiation dose noncontrast CT evaluation demonstrates no clinically significant abnormality of the imaged portions of the liver, spleen, pancreas, or adrenal   glands. GALLBLADDER/BILIARY TREE:  No calcified gallstones. No pericholecystic inflammatory change. No biliary dilatation. STOMACH AND BOWEL: Postsurgical changes in the rectum. Relatively underdistended colon. Loop ileostomy in the right lower quadrant no discrete evidence of bowel obstruction. APPENDIX:  No findings to suggest appendicitis. ABDOMINOPELVIC CAVITY:  No ascites. No pneumoperitoneum. No lymphadenopathy.      VESSELS: Mild atherosclerosis; no aortic aneurysm. REPRODUCTIVE ORGANS:  Unremarkable for patient's age. ABDOMINAL WALL/INGUINAL REGIONS: Scarring in the anterio   r abdominal wall. OSSEOUS STRUCTURES:  No acute fracture or destructive osseous lesion. IMPRESSION:     Bilateral nephrolithiasis. 5 mm obstructing stone in the mid/distal left ureter with moderate left hydroureteronephrosis, as before. Right-sided nephroureteral stent in place. Moderate right hydroureteronephrosis which appears intervally improved when compared to the prior. Postsurgical changes as described with right-sided ileostomy. No discrete evidence of bowel obstruction. Several subcentimeter pulmonary nodules are redemonstrated, as before, suspicious for pulmonary metastatic disease. Better described on recent prior CT of the chest, abdomen, and pelvis. Other findings as above. Workstation performed: [id number]           Final Result by Brien Zamorano DO (12/14 2304)      Bilateral nephrolithiasis. 5 mm obstructing stone in the mid/distal left ureter with moderate left hydroureteronephrosis, as before. Right-sided nephroureteral stent in place. Moderate right hydroureteronephrosis which appears intervally improved when compared to the prior. Postsurgical changes as described with right-sided ileostomy. No discrete evidence of bowel obstruction. Several subcentimeter pulmonary nodules are redemonstrated, as before, suspicious for pulmonary metastatic disease. Better described on recent prior CT of the chest, abdomen, and pelvis. Other findings as above.                Workstation performed: [id number]                    Procedures  Procedures         ED Course  ED Course as of 12/15/23 0644   Thu Dec 14, 2023   1925 12/11/2023 -CT CHEST, ABDOMEN AND PELVIS WITH IV CONTRAST-"New moderate to severe left-sided hydroureteronephrosis to the level of a 6 mm obstructing distal ureteral calculus. The left ureteral stent has been removed in the interim. New moderate to severe right-sided hydroureteronephrosis to the level of the proximal ureter with stably positioned indwelling ureteral stent, the proximal portion of which is probably encrusted, similar to prior. Bilateral nephrolithiasis. .."   2145 Discussed patient case with Dary Valladares, urology AP-with indication to transfer patient to [facility], internal medicine service, n.p.o. at midnight OR for tomorrow                               SBIRT 20yo+      Flowsheet Row Most Recent Value   Initial Alcohol Screen: US AUDIT-C     1. How often do you have a drink containing alcohol? 0 Filed at: 12/15/2023 0050   2. How many drinks containing alcohol do you have on a typical day you are drinking? 0 Filed at: 12/15/2023 0050   3b. FEMALE Any Age, or MALE 65+: How often do you have 4 or more drinks on one occassion? 0 Filed at: 12/15/2023 0050   Audit-C Score 0 Filed at: 12/15/2023 0050   ENRIQUE: How many times in the past year have you. .. Used an illegal drug or used a prescription medication for non-medical reasons? Never Filed at: 12/15/2023 0050                      Medical Decision Making  Patient is a 73-year-old female with a history of colon cancer with colostomy, pretension, kidney stones, sarcoidosis, seizures, history of indwelling bilateral insertion of ureteral stents that presents to [facility] with acute persistent worsening pelvic pain for 2 days. Patient presents with her  this evening that provides part of patient history. Hemodynamically stable and afebrile, no SIRS  Hematuria, urinary tract infection on urinalysis  Normal kidney function, unremarkable electrolytes  CT renal stone study-impression-"Bilateral nephrolithiasis. 5 mm obstructing stone in the mid/distal left ureter with moderate left hydroureteronephrosis, as before. Right-sided nephroureteral stent in place.  Moderate right hydroureteronephrosis which appears intervally improved when compared to the prior. ..."  Delivered Toradol with patient verbalize decrease in pelvic pain symptoms status post medication delivery  Rocephin delivered  Urology consult, see allergy consult note, n.p.o. at midnight, transfer to [facility] for likely stent in a.m. Multimodal pain regimen delivered, antinausea medication  Patient will be transferred to [facility] for ureteral stent  Patient with verbal understanding of all clinical laboratory and imaging findings, transfer instructions and verbalized agreement with patient current treatment plan  EMTALA in place and charted      Amount and/or Complexity of Data Reviewed  Labs: ordered. Decision-making details documented in ED Course. Radiology: ordered and independent interpretation performed. Decision-making details documented in ED Course. Risk  Prescription drug management. Disposition  Final diagnoses:   Pelvic pain   Bilateral nephrolithiasis   Left ureteral stone   UTI (urinary tract infection)     Time reflects when diagnosis was documented in both MDM as applicable and the Disposition within this note       Time User Action Codes Description Comment    12/14/2023  9:39 PM Consuella Gemma Add [R10.2] Pelvic pain     12/14/2023 10:54 PM Lorrei Prader Add [N20.1] Ureteral calculi     12/14/2023 10:54 PM Lorrie Prader Add [N13.5] Ureteral stricture     12/14/2023 11:11 PM Consuella Gemma Add [N20.0] Bilateral nephrolithiasis     12/14/2023 11:11 PM Consuella Gemma Add [N20.1] Left ureteral stone     12/14/2023 11:12 PM Consuella Gemma Add [N39.0] UTI (urinary tract infection)           ED Disposition       ED Disposition   Transfer to Another Facility-In Network    Condition   --    Date/Time   Thu Dec 14, 2023 5521 [address] [facility] should be transferred out to Dr. Rico Rodriguez, internal medicine.                MD Documentation      Flowsheet Row Most Recent Value   Patient Condition The patient has been stabilized such that within reasonable medical probability, no material deterioration of the patient condition or the condition of the unborn child(eugenie) is likely to result from the transfer   Reason for Transfer Level of Care needed not available at this facility   Benefits of Transfer Specialized equipment and/or services available at the receiving facility (Include comment)________________________   Risks of Transfer Potential for delay in receiving treatment, Potential deterioration of medical condition, Loss of IV, Increased discomfort during transfer, Possible worsening of condition or death during transfer   Accepting Physician LORETTA [address] [facility] Name, [address]    (Name & Tel number) Marybel Noe PA-C   Provider Certification General risk, such as traffic hazards, adverse weather conditions, rough terrain or turbulence, possible failure of equipment (including vehicle or aircraft), or consequences of actions of persons outside the control of the transport personnel, Unanticipated needs of medical equipment and personnel during transport, The possibility of a transport vehicle being unavailable, Risk of worsening condition          RN Documentation      [address] Name, [address]    (Name & Tel number) Marybel Rosa          Follow-up Information    None         Patient's Medications   Discharge Prescriptions    No medications on file       No discharge procedures on file.     PDMP Review         Value Time User    PDMP Reviewed  Yes 11/13/2023  9:19 AM Noreen Cox MD            ED Provider  Electronically Signed by             Veronica Castillo PA-C  12/15/23 1554

## 2023-12-15 NOTE — ASSESSMENT & PLAN NOTE
WBC 8.97  Positive pyuria  Urine culture pending  Afebrile, vital signs stable  Ceftriaxone given in emergency department  Empiric antibiotics tailored to culture  Medical optimization per primary team

## 2023-12-15 NOTE — ANESTHESIA PREPROCEDURE EVALUATION
Procedure:  CYSTOSCOPY, BILATERAL  RETROGRADE PYELOGRAM , STENT EXCHANGE RIGHT , LEFT URETEROSCOPY ,  HOLMIUM LASER WITH INSERTION LEFT URETERAL STENT (Bilateral: Bladder)    Relevant Problems   CARDIO   (+) Chest pain   (+) Hypertension   (+) Nonrheumatic mitral valve regurgitation      GI/HEPATIC   (+) Malignant neoplasm of sigmoid colon (HCC)   (+) Partial small bowel obstruction (HCC)   (+) Rectal cancer (HCC)      /RENAL   (+) Hydronephrosis due to ureteral stricture   (+) Kidney calculi      HEMATOLOGY   (+) Other specified anemias   (+) Thrombocytopenia (HCC)      NEURO/PSYCH   (+) Anxiety about health        Physical Exam    Airway    Mallampati score: II  TM Distance: >3 FB  Neck ROM: full     Dental   No notable dental hx     Cardiovascular  Rhythm: regular, Rate: normal, Cardiovascular exam normal    Pulmonary  Pulmonary exam normal Breath sounds clear to auscultation    Other Findings  post-pubertal.      Anesthesia Plan  ASA Score- 3     Anesthesia Type- general with ASA Monitors. Additional Monitors:     Airway Plan: LMA. Plan Factors-Exercise tolerance (METS): >4 METS. Chart reviewed. EKG reviewed. Imaging results reviewed. Existing labs reviewed. Patient summary reviewed. Patient is not a current smoker. Induction- intravenous. Postoperative Plan- Plan for postoperative opioid use. Informed Consent- Anesthetic plan and risks discussed with patient. I personally reviewed this patient with the CRNA. Discussed and agreed on the Anesthesia Plan with the CRNA. Maximo Lora

## 2023-12-15 NOTE — ASSESSMENT & PLAN NOTE
CT: New moderate to severe left-sided hydroureteronephrosis to the level of 6 mm obstructing distal ureteral calculus   WBC 8.97  Creat 0.96  Afebrile, vital signs stable, nontoxic  Transfer to [facility] and admit to medical service  N.p.o. after midnight  Case requested for left stent placement and possible right stent exchange on 12/15

## 2023-12-16 ENCOUNTER — TELEPHONE (OUTPATIENT)
Dept: OTHER | Facility: HOSPITAL | Age: 58
End: 2023-12-16

## 2023-12-16 LAB
ANION GAP SERPL CALCULATED.3IONS-SCNC: 8 MMOL/L
BACTERIA UR CULT: NORMAL
BASOPHILS # BLD AUTO: 0.01 THOUSANDS/ÂΜL (ref 0–0.1)
BASOPHILS NFR BLD AUTO: 0 % (ref 0–1)
BUN SERPL-MCNC: 21 MG/DL (ref 5–25)
CALCIUM SERPL-MCNC: 8.7 MG/DL (ref 8.4–10.2)
CHLORIDE SERPL-SCNC: 105 MMOL/L (ref 96–108)
CO2 SERPL-SCNC: 27 MMOL/L (ref 21–32)
CREAT SERPL-MCNC: 0.84 MG/DL (ref 0.6–1.3)
EOSINOPHIL # BLD AUTO: 0.09 THOUSAND/ÂΜL (ref 0–0.61)
EOSINOPHIL NFR BLD AUTO: 2 % (ref 0–6)
ERYTHROCYTE [DISTWIDTH] IN BLOOD BY AUTOMATED COUNT: 15 % (ref 11.6–15.1)
GFR SERPL CREATININE-BSD FRML MDRD: 77 ML/MIN/1.73SQ M
GLUCOSE SERPL-MCNC: 78 MG/DL (ref 65–140)
HCT VFR BLD AUTO: 32.3 % (ref 34.8–46.1)
HGB BLD-MCNC: 9.7 G/DL (ref 11.5–15.4)
IMM GRANULOCYTES # BLD AUTO: 0.02 THOUSAND/UL (ref 0–0.2)
IMM GRANULOCYTES NFR BLD AUTO: 0 % (ref 0–2)
LYMPHOCYTES # BLD AUTO: 0.27 THOUSANDS/ÂΜL (ref 0.6–4.47)
LYMPHOCYTES NFR BLD AUTO: 6 % (ref 14–44)
MAGNESIUM SERPL-MCNC: 1.4 MG/DL (ref 1.9–2.7)
MCH RBC QN AUTO: 24.6 PG (ref 26.8–34.3)
MCHC RBC AUTO-ENTMCNC: 30 G/DL (ref 31.4–37.4)
MCV RBC AUTO: 82 FL (ref 82–98)
MONOCYTES # BLD AUTO: 0.19 THOUSAND/ÂΜL (ref 0.17–1.22)
MONOCYTES NFR BLD AUTO: 4 % (ref 4–12)
NEUTROPHILS # BLD AUTO: 3.93 THOUSANDS/ÂΜL (ref 1.85–7.62)
NEUTS SEG NFR BLD AUTO: 88 % (ref 43–75)
NRBC BLD AUTO-RTO: 0 /100 WBCS
PLATELET # BLD AUTO: 187 THOUSANDS/UL (ref 149–390)
PMV BLD AUTO: 9.6 FL (ref 8.9–12.7)
POTASSIUM SERPL-SCNC: 2.9 MMOL/L (ref 3.5–5.3)
RBC # BLD AUTO: 3.95 MILLION/UL (ref 3.81–5.12)
SODIUM SERPL-SCNC: 140 MMOL/L (ref 135–147)
WBC # BLD AUTO: 4.51 THOUSAND/UL (ref 4.31–10.16)

## 2023-12-16 PROCEDURE — 99232 SBSQ HOSP IP/OBS MODERATE 35: CPT | Performed by: PHYSICIAN ASSISTANT

## 2023-12-16 PROCEDURE — 83735 ASSAY OF MAGNESIUM: CPT | Performed by: INTERNAL MEDICINE

## 2023-12-16 PROCEDURE — 80048 BASIC METABOLIC PNL TOTAL CA: CPT | Performed by: INTERNAL MEDICINE

## 2023-12-16 PROCEDURE — 85025 COMPLETE CBC W/AUTO DIFF WBC: CPT | Performed by: INTERNAL MEDICINE

## 2023-12-16 PROCEDURE — 99239 HOSP IP/OBS DSCHRG MGMT >30: CPT | Performed by: INTERNAL MEDICINE

## 2023-12-16 RX ORDER — OXYBUTYNIN CHLORIDE 5 MG/1
5 TABLET ORAL 3 TIMES DAILY PRN
Qty: 60 TABLET | Refills: 0 | Status: SHIPPED | OUTPATIENT
Start: 2023-12-16

## 2023-12-16 RX ORDER — PHENAZOPYRIDINE HYDROCHLORIDE 100 MG/1
100 TABLET, FILM COATED ORAL
Qty: 10 TABLET | Refills: 0 | Status: SHIPPED | OUTPATIENT
Start: 2023-12-16

## 2023-12-16 RX ADMIN — URSODIOL 900 MG: 300 CAPSULE ORAL at 10:08

## 2023-12-16 RX ADMIN — CYANOCOBALAMIN TAB 500 MCG 1000 MCG: 500 TAB at 10:06

## 2023-12-16 RX ADMIN — ENOXAPARIN SODIUM 40 MG: 40 INJECTION SUBCUTANEOUS at 10:06

## 2023-12-16 RX ADMIN — OXYBUTYNIN CHLORIDE 5 MG: 5 TABLET ORAL at 10:06

## 2023-12-16 RX ADMIN — PHENAZOPYRIDINE 100 MG: 100 TABLET ORAL at 12:13

## 2023-12-16 RX ADMIN — CEPHALEXIN 500 MG: 500 CAPSULE ORAL at 13:43

## 2023-12-16 RX ADMIN — PREDNISONE 10 MG: 10 TABLET ORAL at 10:07

## 2023-12-16 RX ADMIN — PHENAZOPYRIDINE 100 MG: 100 TABLET ORAL at 10:08

## 2023-12-16 RX ADMIN — AMLODIPINE BESYLATE 10 MG: 10 TABLET ORAL at 10:07

## 2023-12-16 RX ADMIN — CEPHALEXIN 500 MG: 500 CAPSULE ORAL at 05:25

## 2023-12-16 RX ADMIN — ACETAMINOPHEN 975 MG: 325 TABLET, FILM COATED ORAL at 05:25

## 2023-12-16 RX ADMIN — SODIUM CHLORIDE, SODIUM LACTATE, POTASSIUM CHLORIDE, AND CALCIUM CHLORIDE 50 ML/HR: .6; .31; .03; .02 INJECTION, SOLUTION INTRAVENOUS at 04:19

## 2023-12-16 RX ADMIN — POTASSIUM CHLORIDE 20 MEQ: 1500 TABLET, EXTENDED RELEASE ORAL at 10:07

## 2023-12-16 RX ADMIN — ACETAMINOPHEN 975 MG: 325 TABLET, FILM COATED ORAL at 13:43

## 2023-12-16 NOTE — TELEPHONE ENCOUNTER
58-year-old female history of rectal cancer and past urologic history of right ureteral stricture managed with ureteral stent exchanges every 4 months and recent ureteroscopy on the left and stent removal.    Presenting with left flank pain CT scan revealing ureteral calculus with hydronephrosis and hydronephrosis on the right..  Now status post right stent exchange and right ureteroscopy with Dr. Bhakta stent without a string.    Per prior plan will need exchanges every 4 months with Dr. Stephens for the right side.    Will require cystoscopy stent removal 7 -10 days

## 2023-12-16 NOTE — PROGRESS NOTES
Progress Note - Urology   Danielle Haque 58 y.o. female MRN: [id number]  Unit/Bed#: [facility] 922-01 Encounter: [id number]    Assessment & Plan:    58-year-old female history of rectal cancer and known to Dr. Stephens with history of right ureteral stricture managed with right ureteral stent and left ureteral stent that was recently removed presenting with left abdominal pain secondary to a 6 mm distal left ureteral calculus:    -Now postop day 1 right ureteral stent exchange and left ureteroscopy, laser lithotripsy and ureteral stent insertion without a string.  -Anticipate need for stent for 10 to 14 days due to infection.  Will plan for stent removal in the office.  -Patient afebrile hemodynamically stable postoperatively  -Awaiting repeat labs.  Initially without THOR or leukocytosis.  -Discussed with patient what to expect postoperatively with ureteral stent including frequency, urgency, flank pain with urination, dysuria and intermittent hematuria as long as ureteral stent is in place.  -Continue medical optimization postoperatively.  -No further  intervention required this admission.  Urology will sign off.  Was available for additional questions and concerns in regards to this patient.  Will plan for continued outpatient management of ureteral stents.    Subjective/Objective   Chief Complaint:     Subjective: Patient sitting comfortably in bed no acute distress.  Denies any current abdominal pain nausea fevers chills vomiting.  Reports to feel much improved post procedure.    Objective:     Blood pressure 116/56, pulse 72, temperature 98.6 °F (37 °C), resp. rate 18, SpO2 95%.,There is no height or weight on file to calculate BMI.      Intake/Output Summary (Last 24 hours) at 12/16/2023 0455  Last data filed at 12/16/2023 0420  Gross per 24 hour   Intake 600 ml   Output 1 ml   Net 599 ml       Invasive Devices       Central Venous Catheter Line  Duration             Port A Cath 03/21/23 Right Internal jugular  269 days              Peripheral Intravenous Line  Duration             Peripheral IV 12/14/23 Left Antecubital 1 day    Peripheral IV 12/14/23 Proximal;Right;Ventral (anterior) Forearm 1 day              Drain  Duration             Ureteral Internal Stent Left ureter 6 Fr. 147 days    Ureteral Internal Stent Right ureter 7 Fr. 147 days    Ileostomy Loop  days    Ureteral Internal Stent Right ureter 7 Fr. 65 days                  Physical Exam  Constitutional:       General: She is not in acute distress.     Appearance: She is normal weight. She is not ill-appearing, toxic-appearing or diaphoretic.   HENT:      Head: Normocephalic and atraumatic.      Right Ear: External ear normal.      Left Ear: External ear normal.      Nose: Nose normal.      Mouth/Throat:      Pharynx: Oropharynx is clear.   Eyes:      General: No scleral icterus.     Conjunctiva/sclera: Conjunctivae normal.   Cardiovascular:      Rate and Rhythm: Normal rate and regular rhythm.      Pulses: Normal pulses.      Heart sounds: No murmur heard.     No friction rub. No gallop.   Pulmonary:      Effort: Pulmonary effort is normal. No respiratory distress.      Breath sounds: No wheezing or rales.   Abdominal:      General: Bowel sounds are normal. There is no distension.      Tenderness: There is no abdominal tenderness.      Comments: Ostomy present with gas.   Musculoskeletal:         General: Normal range of motion.      Cervical back: Normal range of motion.   Skin:     General: Skin is warm.   Neurological:      General: No focal deficit present.      Mental Status: She is alert and oriented to person, place, and time.   Psychiatric:         Mood and Affect: Mood normal.         Behavior: Behavior normal.         Thought Content: Thought content normal.         Judgment: Judgment normal.           Lab, Imaging and other studies:  Lab Results   Component Value Date    WBC 8.97 12/14/2023    HGB 11.3 (L) 12/14/2023    HCT 36.6 12/14/2023     MCV 81 (L) 12/14/2023     12/14/2023     Lab Results   Component Value Date    SODIUM 137 12/14/2023    K 4.6 12/14/2023     12/14/2023    CO2 23 12/14/2023    BUN 33 (H) 12/14/2023    CREATININE 0.96 12/14/2023    GLUC 134 12/14/2023    CALCIUM 10.2 12/14/2023     VTE Pharmacologic Prophylaxis: Enoxaparin (Lovenox)  VTE Mechanical Prophylaxis: sequential compression device      Tarsha Medellin PA-C

## 2023-12-16 NOTE — ASSESSMENT & PLAN NOTE
Bilateral nephrolithiasis. 5 mm obstructing stone in the mid/distal left ureter with moderate left hydroureteronephrosis, as before.   Patient presented to ED with worsening pelvic pain  Underlying history of ureteral strictures with indwelling right ureteral stent  Abdomen pelvis CT scan with bilateral nephrolithiasis. 5 mm obstructing stone in the mid/distal left ureter with moderate left hydroureteronephrosis, as before. right-sided nephroureteral stent in place. Moderate right hydroureteronephrosis    Patient was evaluated by urology  She underwent cystoscopy, bilateral retrograde pyelograms, exchange of right ureteral stent, left ureteroscopy with holmium laser lithotripsy and placement of left ureteral stent.     Continue with IV fluid and pain control

## 2023-12-16 NOTE — DISCHARGE SUMMARY
[facility]  Discharge- Danielle Haque 1965, 58 y.o. female MRN: [id number]  [facility] Encounter: [id number]  Primary Care Provider: Leena Anaya MD   Date and time admitted to hospital: 12/15/2023 11:43 AM    * Ureteral calculi  Assessment & Plan  Bilateral nephrolithiasis. 5 mm obstructing stone in the mid/distal left ureter with moderate left hydroureteronephrosis, as before.   Patient presented to ED with worsening pelvic pain  Underlying history of ureteral strictures with indwelling right ureteral stent  Abdomen pelvis CT scan with bilateral nephrolithiasis. 5 mm obstructing stone in the mid/distal left ureter with moderate left hydroureteronephrosis, as before. right-sided nephroureteral stent in place. Moderate right hydroureteronephrosis    Patient was evaluated by urology  She underwent cystoscopy, bilateral retrograde pyelograms, exchange of right ureteral stent, left ureteroscopy with holmium laser lithotripsy and placement of left ureteral stent.     Continue with IV fluid and pain control      Pulmonary nodules  Assessment & Plan  CT abdomen pelvis with several subcentimeter pulmonary nodules are redemonstrated, as before, suspicious for pulmonary metastatic disease. Better described on recent prior CT of the chest, abdomen, and pelvis.   Outpatient oncology follow-up    Rectal cancer (HCC)  Assessment & Plan  Outpatient hematology oncology follow-up    Acute cystitis with hematuria  Assessment & Plan  Abnormal urinalysis consistent with UTI  Patient is afebrile without leukocytosis  Currently on Keflex per urology  Follow on urine culture    Sarcoidosis  Assessment & Plan  Follow with pulmonology outpatient  Continue with prednisone    Hypertension  Assessment & Plan  Acceptable BP  Continue Norvasc              Medical Problems       Resolved Problems  Date Reviewed: 12/15/2023   None         Admission Date:   Admission Orders (From admission,  onward)       Ordered        12/15/23 1537  Inpatient Admission  Once                            Admitting Diagnosis: Ureteral calculi [N20.1]  Ureteral stricture [N13.5]    HPI: This is a very pleasant 58-year-old female with past medical history of rectal cancer status post chemoradiation, history of lung nodule hypertension sarcoidosis right-sided ureteral stricture managed with right-sided ureteral stent who presents to the hospital with reported severe pelvic and genital pain.  Patient presented with a history of complex bilateral ureteral and intrarenal calculi with known history of right proximal ureteral stricture.  Most recently she had a cystoscopy with right retrograde pyelogram with right ureteroscopy and right ureteral stent exchange with left ureteral stent removal back in October 11 of this year.  Unfortunately, she presents to the emergency room where she was found to have moderate to severe left-sided hydroureteronephrosis with obstructing distal ureteral calculus.  Patient was transferred to the [facility] for urological assessment.  She had undergone a cystoscopy yesterday with exchange of the right ureteral stent left ureteroscopy with laser lithotripsy and placement of a left ureteral stent    Procedures Performed: No orders of the defined types were placed in this encounter.      Summary of Hospital Course: She appears to have tolerated the procedure well.  No complications reported.  Her pain has improved.  She will require close outpatient follow-up urology with an anticipated need for stent 10 to 14 days due to infection.  Will plan for stent removal in the urology office        Condition at Discharge: fair         Discharge instructions/Information to patient and family:   See after visit summary for information provided to patient and family.      Provisions for Follow-Up Care:  See after visit summary for information related to follow-up care and any pertinent home health orders.       PCP: Leena Anaya MD    Disposition: Home    Planned Readmission: No    Discharge Statement   I spent 85 minutes discharging the patient. This time was spent on the day of discharge. I had direct contact with the patient on the day of discharge. Additional documentation is required if more than 30 minutes were spent on discharge.     Discharge Medications:  See after visit summary for reconciled discharge medications provided to patient and family.

## 2023-12-17 VITALS
DIASTOLIC BLOOD PRESSURE: 59 MMHG | SYSTOLIC BLOOD PRESSURE: 97 MMHG | TEMPERATURE: 97.7 F | OXYGEN SATURATION: 97 % | RESPIRATION RATE: 20 BRPM | HEART RATE: 82 BPM

## 2023-12-17 LAB
BACTERIA BLD CULT: NORMAL
BACTERIA BLD CULT: NORMAL
BACTERIA UR CULT: NORMAL

## 2023-12-18 ENCOUNTER — CLINICAL SUPPORT (OUTPATIENT)
Dept: RADIATION ONCOLOGY | Facility: HOSPITAL | Age: 58
End: 2023-12-18
Attending: STUDENT IN AN ORGANIZED HEALTH CARE EDUCATION/TRAINING PROGRAM
Payer: COMMERCIAL

## 2023-12-18 VITALS
RESPIRATION RATE: 18 BRPM | HEART RATE: 105 BPM | SYSTOLIC BLOOD PRESSURE: 120 MMHG | TEMPERATURE: 98 F | DIASTOLIC BLOOD PRESSURE: 80 MMHG | HEIGHT: 62 IN | OXYGEN SATURATION: 98 % | BODY MASS INDEX: 26.7 KG/M2

## 2023-12-18 DIAGNOSIS — R91.8 LUNG NODULE, MULTIPLE: Primary | ICD-10-CM

## 2023-12-18 DIAGNOSIS — C18.7 MALIGNANT NEOPLASM OF SIGMOID COLON (HCC): ICD-10-CM

## 2023-12-18 DIAGNOSIS — C18.7 MALIGNANT NEOPLASM OF SIGMOID COLON (HCC): Primary | ICD-10-CM

## 2023-12-18 PROCEDURE — 99215 OFFICE O/P EST HI 40 MIN: CPT | Performed by: STUDENT IN AN ORGANIZED HEALTH CARE EDUCATION/TRAINING PROGRAM

## 2023-12-18 PROCEDURE — 99211 OFF/OP EST MAY X REQ PHY/QHP: CPT | Performed by: STUDENT IN AN ORGANIZED HEALTH CARE EDUCATION/TRAINING PROGRAM

## 2023-12-18 NOTE — PROGRESS NOTES
Danielle Haque 1965 is a 58 y.o. female with a history of sarcoidosis who presented with Stage IIA (cT3N0) mid to high rectal adenocarcinoma. She was started on [place] in [place] and received 3 cycles with significant difficulty. She thereafter transferred her care to [facility] with decision to transition to chemoRT. On 5/25/23 she completed a course of neoadjuvant chemoRT to a dose of 5040 cGy in 28 fractions with concurrent 5-FU. The pt was last seen in radiation on 06/29/23 via telemedicine visit. She returns today for follow up.    07/19/23 - MRI pelvis rectal cancer staging wo contrast  IMPRESSION:  Unenhanced MRI of the Pelvis (Rectal Protocol)  SINCE 3/17/2023, POST TREATMENT PRIMARY TUMOR ASSESSMENT: Incomplete response (likely residual tumor.) Please note, though incomplete, there has been substantial tumor response to treatment. Marked interval decrease in the size of the high colorectal   tumor with persistent restriction of diffusion.  SUSPICIOUS MESORECTAL LYMPH NODES: No.  SUSPICIOUS EXTRAMESORECTAL LYMPH NODES: None  Fibroid uterus    07/21/23 - Urology, [facility]  PROCEDURES PERFORMED:  1) Cystoscopy  2) bilateral retrograde pyelography with fluoroscopic interpretation  3) Left ureteroscopy with laser lithotripsy and basket extraction of stone  4) bilateral ureteral stent exchange  FINDINGS:  -Successful removal of the remainder of the stones present within the left ureter  - There is persistent high-grade stricturing of the right proximal ureter.  Stent exchange was very difficult.  I was initially unable to thread a wire alongside the preplaced stent due to the dense nature of the stricture.  -We will plan to maintain the patient's ureteral stents through the time of her upcoming colorectal surgical resection.  -Following that procedure we will plan for repeat imaging with a likely plan to remove her left ureteral stent and maintain her right ureteral stent chronically.  -The severity of her right  proximal ureteral stricture disease I do not believe she is a candidate for any attempted endoscopic correction    08/17/23 - Colon and Rectal Surg[facility]ab[facility]arcenio  Procedure(s):  Diagnostic laparoscopy  Robotic assisted low anterior resection  Low pelvic anastomosis, EEA 29 colorectal anastomosis, stapled  Left-sided loop colostomy reversal, including the accompanying colonic resection  Diverting Loop ileostomy right lower quadrant      08/23/23 - 08/29/23 - Admitted to hospital  Abdominal pain   Partial small bowel obstruction - NG tube inserted, bowel rest initiated  By day 4, pt had more significant stoma function - tube removed      08/23/23 - CT abdomen pelvis w contrast  IMPRESSION:  Multiple loops of abnormally dilated proximal to mid small bowel with relative collapse of the distal small bowel most compatible with small bowel obstruction. A clear transition point is not identified. Surgical consultation is advised.  A few scattered tiny foci of free intraperitoneal air are noted likely postoperative in nature in this patient with a history of recent low anterior colon resection with right mid abdominal ileostomy.  Small amount of intra-abdominal and pelvic ascites.  Stable position of bilateral ureteral stents with no significant hydronephrosis.    09/26/23 - Colon and Rectal Surg, [facility]  Pt doing well post op  Ostomy reversal planned for 4 months    10/03/23 - Iban Bravo  Pt to continue on surveillance  No clinically concerning findings at this time  Pt to undergo reanastomosis if no recurrence    12/11/23 - CT chest abdomen pelvis w contrast  IMPRESSION:  New moderate to severe left-sided hydroureteronephrosis to the level of a 6 mm obstructing distal ureteral calculus. The left ureteral stent has been removed in the interim.  New moderate to severe right-sided hydroureteronephrosis to the level of the proximal ureter with stably positioned indwelling ureteral stent, the proximal portion of which is  probably encrusted, similar to prior.  Bilateral nephrolithiasis.  Findings highly worrisome for pulmonary metastatic disease as evidenced by interval development of multiple new/enlarging pulmonary nodules, many of which are cavitary, in this patient with history of rectal adenocarcinoma. Cavitary pulmonary sarcoidosis   is considered less likely as it is very uncommon and is usually reported in those with severe and active disease.  Spleen remains top normal in size with heterogeneous appearance and some degree of nodularity, similar to prior, likely related to the patient's known history of sarcoidosis.  The study was marked in EPIC for immediate notification.    Hospitalized 23 - 23  Patient presented to ED with worsening pelvic pain  Underlying history of ureteral strictures with indwelling right ureteral stent  Abdomen pelvis CT scan with bilateral nephrolithiasis. 5 mm obstructing stone in the mid/distal left ureter with moderate left hydroureteronephrosis, as before. right-sided nephroureteral stent in place. Moderate right hydroureteronephrosis  She underwent cystoscopy, bilateral retrograde pyelograms, exchange of right ureteral stent, left ureteroscopy with holmium laser lithotripsy and placement of left ureteral stent.     23 CT renal stone study abdomen pelvis without contrast  IMPRESSION:   Bilateral nephrolithiasis. 5 mm obstructing stone in the mid/distal left ureter with moderate left hydroureteronephrosis, as before.   Right-sided nephroureteral stent in place. Moderate right hydroureteronephrosis which appears intervally improved when compared to the prior.   Postsurgical changes as described with right-sided ileostomy. No discrete evidence of bowel obstruction.   Several subcentimeter pulmonary nodules are redemonstrated, as before, suspicious for pulmonary metastatic disease. Better described on recent prior CT of the chest, abdomen, and pelvis.     [facility] - [facility]  Onc, Ferrera    Oncology History   Malignant neoplasm of sigmoid colon (HCC)   10/28/2022 Biopsy    Colon, Rectosigmoid Colon Mass Biopsy (1 slide [id number] [facility], collected 10/28/2022):  - Adenocarcinoma arising in a tubular adenoma     12/1/2022 Biopsy    DIAGNOSIS LIVER, SEGMENT 3, RESECTION:  - HYALINIZED SUBCAPSULAR AND INTRA-PARENCHYMAL NODULES. NO MALIGNANCY SEEN. SEE NOTE  - MILD MACROVESICULAR STEATOSIS (25%). NO EVIDENCE OF STEATOHEPATITIS. TRICHOME STAIN SHOWS MILD PORTAL FIBROSIS.   - 2+ IRON DEPOSITION WITHIN KUPFFER CELLS (IRON STAIN), CONSISTENT WITH SECONDARY HEMOCHROMATOSIS.     NOTE: THIS LIKELY REPRESENTS A MARKEDLY SCLEROTIC HEMANGIOMA OR CHANGES SECONDARY TO INJURY.               12/1/2022 Surgery    Laparoscopic diverting colostomy with liver biopsy. Dr. Serrano.      12/25/2022 Initial Diagnosis    Malignant neoplasm of sigmoid colon (HCC)     2022 - 1/10/2023 Chemotherapy    Oxaliplatin. [place], [place]     2/24/2023 -  Cancer Staged    Staging form: Colon and Rectum, AJCC 8th Edition  - Clinical: Stage IIA (cT3, cN0, cM0) - Signed by Edouard Ferrera MD on 2/24/2023  Total positive nodes: 0       4/17/2023 -  Chemotherapy    potassium chloride, 20 mEq, Intravenous, Once, 3 of 3 cycles  Administration: 20 mEq (4/17/2023), 20 mEq (4/24/2023), 20 mEq (5/1/2023)  alteplase (CATHFLO), 2 mg, Intracatheter, Every 1 Minute as needed, 6 of 6 cycles  fluorouracil (ADRUCIL) ambulatory infusion Soln, 225 mg/m2/day = 1,880 mg, Intravenous, Over 120 hours, 6 of 6 cycles     4/17/2023 - 5/25/2023 Radiation    Treatments:  Course: C1    Plan ID Energy Fractions Dose per Fraction (cGy) Dose Correction (cGy) Total Dose Delivered (cGy) Elapsed Days   CD Rectum 6X 3 / 3 180 0 540 2   Whole Pelvis 6X 25 / 25 180 0 4,500 35      Treatment Dates:  4/17/2023 - 5/25/2023.      Rectal cancer (HCC)   7/14/2023 Initial Diagnosis    Rectal cancer (HCC)     9/6/2023 -  Cancer Staged     Staging form: Colon and Rectum, AJCC 8th Edition  - Clinical: Stage IIA (cT3, cN0, cM0) - Signed by Edouard Ferrera MD on 9/6/2023  Total positive nodes: 0           Review of Systems:  Review of Systems   Constitutional: Negative.    HENT: Negative.     Eyes: Negative.    Respiratory: Negative.     Cardiovascular:  Positive for chest pain (Left shooting pain, intermittent).   Gastrointestinal:  Positive for abdominal pain. Negative for blood in stool, constipation, diarrhea, nausea, rectal pain and vomiting.   Endocrine: Negative.    Genitourinary:  Positive for hematuria (Occasional) and vaginal pain (Intermittent, few times weekly). Negative for pelvic pain.   Skin:         Occasional skin irritation around stoma   Allergic/Immunologic: Negative.    Neurological: Negative.    Hematological: Negative.    Psychiatric/Behavioral: Negative.         Clinical Trial: no        Health Maintenance   Topic Date Due    HIV Screening  Never done    BMI: Followup Plan  Never done    Annual Physical  Never done    Cervical Cancer Screening  Never done    Osteoporosis Screening  Never done    COVID-19 Vaccine (1) 03/04/2024 (Originally 11/17/1970)    Influenza Vaccine (1) 06/30/2024 (Originally 9/1/2023)    Pneumococcal Vaccine: Pediatrics (0 to 5 Years) and At-Risk Patients (6 to 64 Years) (1 - PCV) 12/04/2024 (Originally 11/17/1971)    DTaP,Tdap,and Td Vaccines (1 - Tdap) 12/04/2024 (Originally 11/17/1988)    Breast Cancer Screening: Mammogram  07/26/2024    Colorectal Cancer Screening  11/01/2024    BMI: Adult  12/12/2024    Depression Screening  12/18/2024    Hepatitis C Screening  Completed    HIB Vaccine  Aged Out    IPV Vaccine  Aged Out    Hepatitis A Vaccine  Aged Out    Meningococcal ACWY Vaccine  Aged Out    HPV Vaccine  Aged Out     Patient Active Problem List   Diagnosis    Malignant neoplasm of sigmoid colon (HCC)    History of Clostridium difficile colitis    Other hemochromatosis    Hypertension     Sarcoidosis    Impaired fasting glucose    Hypokalemia    Transaminitis    Hydronephrosis due to ureteral stricture    Acute cystitis with hematuria    Adrenal nodule (HCC)    Bleeding from colostomy stoma (HCC)    Other specified anemias    Thrombocytopenia (HCC)    Nonrheumatic mitral valve regurgitation    Advance directive discussed with patient    Gross hematuria    Skin lesion    Abnormal CT of the chest    Kidney calculi    Dysuria    Chest pain    Creatinine elevation    Port-A-Cath in place    Drug-induced constipation    Palliative care patient    Cancer related pain    Rectal cancer (HCC)    Annual physical exam    Preop examination    Shingles    Partial small bowel obstruction (HCC)    Scalp pain    Left ear pain    Chronic anticoagulation    Neuralgia involving scalp    Anxiety about health    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    Ureteral stricture    Ureteral calculi    Pulmonary nodules     Past Medical History:   Diagnosis Date    Cancer (HCC)     Colon cancer (HCC)     Fall     Headache     Hemochromatosis, unspecified     History of transfusion     2022 - no adverse reaction    Hypertension     Kidney calculi     Kidney stone     Liver disease     hemangioma    Muscle weakness     Personal history of COVID-19 12/2022    Sarcoidosis     Seizures (HCC)     2022    Shingles      Past Surgical History:   Procedure Laterality Date    ABSCESS DRAINAGE      left breast    BREAST BIOPSY      CHEST TUBE INSERTION      COLON SURGERY      colostomy    COLONOSCOPY      FL GUIDED CENTRAL VENOUS ACCESS DEVICE INSERTION  03/21/2023    FL RETROGRADE PYELOGRAM  01/23/2023    FL RETROGRADE PYELOGRAM  04/03/2023    FL RETROGRADE PYELOGRAM  7/21/2023    FL RETROGRADE PYELOGRAM  10/11/2023    FL RETROGRADE PYELOGRAM  12/15/2023    IR BIOPSY LIVER MASS  05/09/2023    LUNG BIOPSY      DC CYSTO BLADDER W/URETERAL CATHETERIZATION Bilateral 07/21/2023    Procedure: CYSTOSCOPY URETEROSCOPY RETROGRADE  PYELOGRAM WITH BILATERAL STENT URETERAL EXCHANGE; LEFT LASER LITHOTRIPSY AND STONE BASKET RETRIEVAL;  Surgeon: José Luis Stephens MD;  [facility];  Service: Urology    VT CYSTO BLADDER W/URETERAL CATHETERIZATION Bilateral 12/15/2023    Procedure: CYSTOSCOPY, BILATERAL  RETROGRADE PYELOGRAM , STENT EXCHANGE RIGHT , LEFT URETEROSCOPY ,  HOLMIUM LASER WITH INSERTION LEFT URETERAL STENT;  Surgeon: Derick Bhakta MD;  [facility];  Service: Urology    VT CYSTO/URETERO W/LITHOTRIPSY &INDWELL STENT INSRT Bilateral 01/23/2023    Procedure: CYSTOSCOPY  RIGHT URETEROSCOPY WITH LITHOTRIPSY HOLMIUM LASER, BILATERAL RETROGRADE PYELOGRAM AND BILATERAL  URETERAL STENT INSERTION;  Surgeon: José Luis Stephens MD;  [facility];  Service: Urology    VT CYSTO/URETERO W/LITHOTRIPSY &INDWELL STENT INSRT Right 04/03/2023    Procedure: RIGHT URETEROSCOPY WITH LITHOTRIPSY HOLMIUM LASER, RETROGRADE PYELOGRAM; BILATERAL EXCHANGE STENT URETERAL;  Surgeon: José Luis Stephens MD;  [facility];  Service: Urology    VT CYSTO/URETERO W/LITHOTRIPSY &INDWELL STENT INSRT Bilateral 10/11/2023    Procedure: CYSTOSCOPY URETEROSCOPY RETROGRADE PYELOGRAM AND INSERTION STENT URETERAL DIAGNOSTINC RIGHT URETEROSCOPY, REMOVAL STENT LEFT SIDE;  Surgeon: José Luis Stephens MD;  [facility];  Service: Urology    VT INSJ TUNNELED CTR VAD W/SUBQ PORT AGE 5 YR/> N/A 03/21/2023    Procedure: INSERTION VENOUS PORT ( PORT-A-CATH) IR;  Surgeon: Mark Amos DO;  [facility];  Service: Interventional Radiology    VT LAPS COLECTOMY PRTL W/COLOPXTSTMY LW ANAST N/A 8/17/2023    Procedure: RESECTION COLON LOW ANTERIOR LAPAROSCOPIC WITH ROBOTICS;  Surgeon: Sree Umanzor MD;  [facility];  Service: Colorectal    TONSILLECTOMY      URINARY SURGERY Bilateral     bilateral stents     Family History   Problem Relation Age of Onset    Cancer Mother     Cirrhosis Father     Breast  cancer Neg Hx     Breast cancer additional onset Neg Hx      Social History     Socioeconomic History    Marital status:      Spouse name: Not on file    Number of children: Not on file    Years of education: Not on file    Highest education level: Not on file   Occupational History    Not on file   Tobacco Use    Smoking status: Never     Passive exposure: Past    Smokeless tobacco: Never   Vaping Use    Vaping status: Never Used   Substance and Sexual Activity    Alcohol use: Never    Drug use: Never    Sexual activity: Not Currently   Other Topics Concern    Not on file   Social History Narrative    From 4/14/23  note:    Emergency Contact: MATEO TREVIZO (ex-) [phone] (Mobile)    Marital Status:     Caregiver/Support: ex- -Mateo and two dtrs.     Children: two dtrs- nataliia 21 [facility] and doretha 18 [facility]    Child/Elder care: no     Housing: two story house    Home Setup: one level    Lives With:  frankie and two dtrs    Daily Living Activities: independent    Durable Medical Equipment: No    Ambulation: independent    Employment: disabled-SSI-$100 and pension-$700    [place] Status/Location: no     Ability to pay bills: yes. No barriers to paying monthly bills.      POA/LW/AD: no.  Aissatou Galindo is healthcare representative.  MSW emailed advance directive.          Social Determinants of Health     Financial Resource Strain: Low Risk  (11/30/2022)    Received from [facility].    Financial Resource Strain     In the last 12 months, did you worry that your food could run out before you got money to buy more? : No   Food Insecurity: No Food Insecurity (8/18/2023)    Hunger Vital Sign     Worried About Running Out of Food in the Last Year: Never true     Ran Out of Food in the Last Year: Never true   Transportation Needs: No Transportation Needs (8/18/2023)    PRAPARE - Transportation     Lack of Transportation (Medical): No     Lack of  Transportation (Non-Medical): No   Physical Activity: Not on file   Stress: Not on file   Social Connections: Not on file   Intimate Partner Violence: Low Risk  (11/30/2022)    Received from [facility].    Violence     Does anyone in your life hurt you, threaten you, frighten you or make you feel unsafe?: No   Housing Stability: Low Risk  (8/18/2023)    Housing Stability Vital Sign     Unable to Pay for Housing in the Last Year: No     Number of Places Lived in the Last Year: 1     Unstable Housing in the Last Year: No       Current Outpatient Medications:     acetaminophen (TYLENOL) 500 mg tablet, Take 2 tablets (1,000 mg total) by mouth 3 (three) times a day, Disp: 180 tablet, Rfl: 11    amLODIPine (NORVASC) 10 mg tablet, Take 1 tablet (10 mg total) by mouth daily, Disp: 90 tablet, Rfl: 3    cyanocobalamin (VITAMIN B-12) 1000 MCG tablet, Take 1,000 mcg by mouth daily, Disp: , Rfl:     oxybutynin (DITROPAN) 5 mg tablet, Take 1 tablet (5 mg total) by mouth 3 (three) times a day as needed (bladder spasms), Disp: 60 tablet, Rfl: 0    phenazopyridine (PYRIDIUM) 100 mg tablet, Take 1 tablet (100 mg total) by mouth 3 (three) times a day with meals, Disp: 10 tablet, Rfl: 0    predniSONE 10 mg tablet, Take 1 tablet (10 mg total) by mouth daily Please take for 4 weeks and then decrease by 5 mg increments every 2 weeks. -- Taking 20mg and then 10mg - titrating, Disp: 30 tablet, Rfl: 2    ursodiol (ACTIGALL) 300 mg capsule, Take 3 capsules (900 mg total) by mouth in the morning, Disp: 90 capsule, Rfl: 11    potassium chloride (MICRO-K) 10 MEQ CR capsule, Take 4 capsules (40 mEq total) by mouth 2 (two) times a day, Disp: 240 capsule, Rfl: 0  Allergies   Allergen Reactions    Oxaliplatin Shortness Of Breath     Reactions occurred with 2nd and 3rd infusions (about 1-3 hours from initiation of infusion) and required treatment with steroids and antihistamines. Please refer to allergy note on 2/7/2023 for  "detailed description of her reactions.    Morphine Nausea Only     \"Every dose made me nauseous\" (oral dosing only, can tolerate IV morphine)     Vitals:    12/18/23 1412   BP: 120/80   BP Location: Left arm   Patient Position: Sitting   Cuff Size: Standard   Pulse: 105   Resp: 18   Temp: 98 °F (36.7 °C)   TempSrc: Temporal   SpO2: 98%   Height: 5' 2\" (1.575 m)      Pain Score: 0-No pain  "

## 2023-12-19 NOTE — TELEPHONE ENCOUNTER
Per staff message -      Davion please schedule:  -Cystoscopy with advanced practitioner for left ureteral stent removal in 4 weeks  - Joellen please schedule her for cystoscopy with right ureteral stent exchange with me in the [facility] in 4 months

## 2023-12-19 NOTE — PROGRESS NOTES
Follow-up - Radiation Oncology   Danielle Haque 1965 58 y.o. female [id number]      History of Present Illness   Cancer Staging   Malignant neoplasm of sigmoid colon (HCC)  Staging form: Colon and Rectum, AJCC 8th Edition  - Clinical: Stage IIA (cT3, cN0, cM0) - Signed by Edouard Ferrera MD on 2/24/2023  Total positive nodes: 0    Rectal cancer (HCC)  Staging form: Colon and Rectum, AJCC 8th Edition  - Clinical: Stage IIA (cT3, cN0, cM0) - Signed by Edouard Ferrera MD on 9/6/2023  Total positive nodes: 0    Ms. Danielle Haque is a 58 year old woman with a history of sarcoidosis who presented with Stage IIA (cT3N0) mid to high rectal adenocarcinoma. She was started on [place] in [place] and received 3 cycles with significant difficulty. She thereafter transferred her care to [facility] with decision to transition to chemoRT. On 5/25/23 she completed a course of neoadjuvant chemoRT to a dose of 5040 cGy in 28 fractions with concurrent 5-FU. She returns today for follow-up.      Interval History:  The patient was last seen in follow-up on 6/29/2023, at that time recovering from her prior course of radiation therapy.    Since then the patient underwent robot-assisted low anterior resection with left-sided loop colostomy reversal and diverting right lower quadrant loop ileostomy.  Pathology demonstrated 2.5 cm residual adenocarcinoma arising in a tubular adenoma, 0/17 lymph nodes involved, margins negative.  She is planned for final colostomy reversal in the next several months.    From a urologic standpoint, the patient suffered multiple setbacks with recurrent nephrolithiasis complicated by ureteral strictures and ureteral stents.  She was most recently hospitalized from 12/14/2023 to 12/16/2023 at which time she underwent cystoscopy, bilateral retrograde pyelograms, and exchange of stents as well as lithotripsy.    CT C/A/P (12/11/23) demonstrated:  Findings highly worrisome for pulmonary metastatic disease  as evidenced by interval development of multiple new/enlarging pulmonary nodules, many of which are cavitary, in this patient with history of rectal adenocarcinoma. Cavitary pulmonary sarcoidosis   is considered less likely as it is very uncommon and is usually reported in those with severe and active disease.    Currently the patient is doing fair overall. She has ongoing vaginal/pelvic pain that she relates to her ureteral stents but otherwise has no ongoing rectal symptoms.       Historical Information   Oncology History   Malignant neoplasm of sigmoid colon (HCC)   10/28/2022 Biopsy    Colon, Rectosigmoid Colon Mass Biopsy (1 slide [id number] [facility], collected 10/28/2022):  - Adenocarcinoma arising in a tubular adenoma     12/1/2022 Biopsy    DIAGNOSIS LIVER, SEGMENT 3, RESECTION:  - HYALINIZED SUBCAPSULAR AND INTRA-PARENCHYMAL NODULES. NO MALIGNANCY SEEN. SEE NOTE  - MILD MACROVESICULAR STEATOSIS (25%). NO EVIDENCE OF STEATOHEPATITIS. TRICHOME STAIN SHOWS MILD PORTAL FIBROSIS.   - 2+ IRON DEPOSITION WITHIN KUPFFER CELLS (IRON STAIN), CONSISTENT WITH SECONDARY HEMOCHROMATOSIS.     NOTE: THIS LIKELY REPRESENTS A MARKEDLY SCLEROTIC HEMANGIOMA OR CHANGES SECONDARY TO INJURY.               12/1/2022 Surgery    Laparoscopic diverting colostomy with liver biopsy. Dr. Serrano.      12/25/2022 Initial Diagnosis    Malignant neoplasm of sigmoid colon (HCC)     2022 - 1/10/2023 Chemotherapy    Oxaliplatin. [place], [place]     2/24/2023 -  Cancer Staged    Staging form: Colon and Rectum, AJCC 8th Edition  - Clinical: Stage IIA (cT3, cN0, cM0) - Signed by Edouard Ferrera MD on 2/24/2023  Total positive nodes: 0       4/17/2023 -  Chemotherapy    potassium chloride, 20 mEq, Intravenous, Once, 3 of 3 cycles  Administration: 20 mEq (4/17/2023), 20 mEq (4/24/2023), 20 mEq (5/1/2023)  alteplase (CATHFLO), 2 mg, Intracatheter, Every 1 Minute as needed, 6 of 6 cycles  fluorouracil (ADRUCIL)  ambulatory infusion Soln, 225 mg/m2/day = 1,880 mg, Intravenous, Over 120 hours, 6 of 6 cycles     4/17/2023 - 5/25/2023 Radiation    Treatments:  Course: C1    Plan ID Energy Fractions Dose per Fraction (cGy) Dose Correction (cGy) Total Dose Delivered (cGy) Elapsed Days   CD Rectum 6X 3 / 3 180 0 540 2   Whole Pelvis 6X 25 / 25 180 0 4,500 35      Treatment Dates:  4/17/2023 - 5/25/2023.      Rectal cancer (HCC)   7/14/2023 Initial Diagnosis    Rectal cancer (HCC)     9/6/2023 -  Cancer Staged    Staging form: Colon and Rectum, AJCC 8th Edition  - Clinical: Stage IIA (cT3, cN0, cM0) - Signed by Edouard Ferrera MD on 9/6/2023  Total positive nodes: 0           Past Medical History:   Diagnosis Date    Cancer (HCC)     Colon cancer (HCC)     Fall     Headache     Hemochromatosis, unspecified     History of transfusion     2022 - no adverse reaction    Hypertension     Kidney calculi     Kidney stone     Liver disease     hemangioma    Muscle weakness     Personal history of COVID-19 12/2022    Sarcoidosis     Seizures (HCC)     2022    Shingles      Past Surgical History:   Procedure Laterality Date    ABSCESS DRAINAGE      left breast    BREAST BIOPSY      CHEST TUBE INSERTION      COLON SURGERY      colostomy    COLONOSCOPY      FL GUIDED CENTRAL VENOUS ACCESS DEVICE INSERTION  03/21/2023    FL RETROGRADE PYELOGRAM  01/23/2023    FL RETROGRADE PYELOGRAM  04/03/2023    FL RETROGRADE PYELOGRAM  7/21/2023    FL RETROGRADE PYELOGRAM  10/11/2023    FL RETROGRADE PYELOGRAM  12/15/2023    IR BIOPSY LIVER MASS  05/09/2023    LUNG BIOPSY      AZ CYSTO BLADDER W/URETERAL CATHETERIZATION Bilateral 07/21/2023    Procedure: CYSTOSCOPY URETEROSCOPY RETROGRADE PYELOGRAM WITH BILATERAL STENT URETERAL EXCHANGE; LEFT LASER LITHOTRIPSY AND STONE BASKET RETRIEVAL;  Surgeon: José Luis Stephens MD;  [facility];  Service: Urology    AZ CYSTO BLADDER W/URETERAL CATHETERIZATION Bilateral 12/15/2023    Procedure:  CYSTOSCOPY, BILATERAL  RETROGRADE PYELOGRAM , STENT EXCHANGE RIGHT , LEFT URETEROSCOPY ,  HOLMIUM LASER WITH INSERTION LEFT URETERAL STENT;  Surgeon: Derick Bhakta MD;  [facility];  Service: Urology    AR CYSTO/URETERO W/LITHOTRIPSY &INDWELL STENT INSRT Bilateral 01/23/2023    Procedure: CYSTOSCOPY  RIGHT URETEROSCOPY WITH LITHOTRIPSY HOLMIUM LASER, BILATERAL RETROGRADE PYELOGRAM AND BILATERAL  URETERAL STENT INSERTION;  Surgeon: José Luis Stephens MD;  [facility];  Service: Urology    AR CYSTO/URETERO W/LITHOTRIPSY &INDWELL STENT INSRT Right 04/03/2023    Procedure: RIGHT URETEROSCOPY WITH LITHOTRIPSY HOLMIUM LASER, RETROGRADE PYELOGRAM; BILATERAL EXCHANGE STENT URETERAL;  Surgeon: José Luis Stephens MD;  [facility];  Service: Urology    AR CYSTO/URETERO W/LITHOTRIPSY &INDWELL STENT INSRT Bilateral 10/11/2023    Procedure: CYSTOSCOPY URETEROSCOPY RETROGRADE PYELOGRAM AND INSERTION STENT URETERAL DIAGNOSTINC RIGHT URETEROSCOPY, REMOVAL STENT LEFT SIDE;  Surgeon: José Luis Stephens MD;  [facility];  Service: Urology    AR INSJ TUNNELED CTR VAD W/SUBQ PORT AGE 5 YR/> N/A 03/21/2023    Procedure: INSERTION VENOUS PORT ( PORT-A-CATH) IR;  Surgeon: Mark Amos DO;  [facility];  Service: Interventional Radiology    AR LAPS COLECTOMY PRTL W/COLOPXTSTMY LW ANAST N/A 8/17/2023    Procedure: RESECTION COLON LOW ANTERIOR LAPAROSCOPIC WITH ROBOTICS;  Surgeon: Sree Umanzor MD;  [facility];  Service: Colorectal    TONSILLECTOMY      URINARY SURGERY Bilateral     bilateral stents       Social History   Social History     Substance and Sexual Activity   Alcohol Use Never     Social History     Substance and Sexual Activity   Drug Use Never     Social History     Tobacco Use   Smoking Status Never    Passive exposure: Past   Smokeless Tobacco Never         Meds/Allergies     Current Outpatient Medications:     acetaminophen (TYLENOL) 500  "mg tablet, Take 2 tablets (1,000 mg total) by mouth 3 (three) times a day, Disp: 180 tablet, Rfl: 11    amLODIPine (NORVASC) 10 mg tablet, Take 1 tablet (10 mg total) by mouth daily, Disp: 90 tablet, Rfl: 3    cyanocobalamin (VITAMIN B-12) 1000 MCG tablet, Take 1,000 mcg by mouth daily, Disp: , Rfl:     oxybutynin (DITROPAN) 5 mg tablet, Take 1 tablet (5 mg total) by mouth 3 (three) times a day as needed (bladder spasms), Disp: 60 tablet, Rfl: 0    phenazopyridine (PYRIDIUM) 100 mg tablet, Take 1 tablet (100 mg total) by mouth 3 (three) times a day with meals, Disp: 10 tablet, Rfl: 0    predniSONE 10 mg tablet, Take 1 tablet (10 mg total) by mouth daily Please take for 4 weeks and then decrease by 5 mg increments every 2 weeks. -- Taking 20mg and then 10mg - titrating, Disp: 30 tablet, Rfl: 2    ursodiol (ACTIGALL) 300 mg capsule, Take 3 capsules (900 mg total) by mouth in the morning, Disp: 90 capsule, Rfl: 11    potassium chloride (MICRO-K) 10 MEQ CR capsule, Take 4 capsules (40 mEq total) by mouth 2 (two) times a day, Disp: 240 capsule, Rfl: 0  Allergies   Allergen Reactions    Oxaliplatin Shortness Of Breath     Reactions occurred with 2nd and 3rd infusions (about 1-3 hours from initiation of infusion) and required treatment with steroids and antihistamines. Please refer to allergy note on 2/7/2023 for detailed description of her reactions.    Morphine Nausea Only     \"Every dose made me nauseous\" (oral dosing only, can tolerate IV morphine)     Review of Systems   Constitutional: Negative.    HENT: Negative.     Eyes: Negative.    Respiratory: Negative.     Cardiovascular:  Positive for chest pain (Left shooting pain, intermittent).   Gastrointestinal:  Positive for abdominal pain. Negative for blood in stool, constipation, diarrhea, nausea, rectal pain and vomiting.   Endocrine: Negative.    Genitourinary:  Positive for hematuria (Occasional) and vaginal pain (Intermittent, few times weekly). Negative for " "pelvic pain.   Skin:         Occasional skin irritation around stoma   Allergic/Immunologic: Negative.    Neurological: Negative.    Hematological: Negative.    Psychiatric/Behavioral: Negative.          OBJECTIVE:   /80 (BP Location: Left arm, Patient Position: Sitting, Cuff Size: Standard)   Pulse 105   Temp 98 °F (36.7 °C) (Temporal)   Resp 18   Ht 5' 2\" (1.575 m)   SpO2 98%   BMI 26.70 kg/m²   Pain Assessment:  0  ECOG/Zubrod/WHO: 1 - Symptomatic but completely ambulatory    Physical Exam   Well appearing. NAD.   No increased work of breathing.   Extremities warm and well perfused.     RESULTS    Lab Results:   Recent Results (from the past 672 hour(s))   CBC and differential    Collection Time: 11/30/23  8:58 AM   Result Value Ref Range    WBC 7.26 4.31 - 10.16 Thousand/uL    RBC 3.92 3.81 - 5.12 Million/uL    Hemoglobin 9.8 (L) 11.5 - 15.4 g/dL    Hematocrit 32.2 (L) 34.8 - 46.1 %    MCV 82 82 - 98 fL    MCH 25.0 (L) 26.8 - 34.3 pg    MCHC 30.4 (L) 31.4 - 37.4 g/dL    RDW 14.6 11.6 - 15.1 %    MPV 9.5 8.9 - 12.7 fL    Platelets 264 149 - 390 Thousands/uL    nRBC 0 /100 WBCs    Neutrophils Relative 86 (H) 43 - 75 %    Immat GRANS % 1 0 - 2 %    Lymphocytes Relative 5 (L) 14 - 44 %    Monocytes Relative 6 4 - 12 %    Eosinophils Relative 2 0 - 6 %    Basophils Relative 0 0 - 1 %    Neutrophils Absolute 6.30 1.85 - 7.62 Thousands/µL    Immature Grans Absolute 0.04 0.00 - 0.20 Thousand/uL    Lymphocytes Absolute 0.36 (L) 0.60 - 4.47 Thousands/µL    Monocytes Absolute 0.41 0.17 - 1.22 Thousand/µL    Eosinophils Absolute 0.14 0.00 - 0.61 Thousand/µL    Basophils Absolute 0.01 0.00 - 0.10 Thousands/µL   Comprehensive metabolic panel    Collection Time: 11/30/23  8:58 AM   Result Value Ref Range    Sodium 137 135 - 147 mmol/L    Potassium 3.9 3.5 - 5.3 mmol/L    Chloride 107 96 - 108 mmol/L    CO2 23 21 - 32 mmol/L    ANION GAP 7 mmol/L    BUN 36 (H) 5 - 25 mg/dL    Creatinine 1.42 (H) 0.60 - 1.30 mg/dL    " Glucose, Fasting 95 65 - 99 mg/dL    Calcium 9.7 8.4 - 10.2 mg/dL    AST 21 13 - 39 U/L    ALT 34 7 - 52 U/L    Alkaline Phosphatase 321 (H) 34 - 104 U/L    Total Protein 6.9 6.4 - 8.4 g/dL    Albumin 3.6 3.5 - 5.0 g/dL    Total Bilirubin 0.82 0.20 - 1.00 mg/dL    eGFR 41 ml/min/1.73sq m   Protime-INR    Collection Time: 11/30/23  8:58 AM   Result Value Ref Range    Protime 13.4 11.6 - 14.5 seconds    INR 1.03 0.84 - 1.19   Sedimentation rate, automated    Collection Time: 11/30/23  8:58 AM   Result Value Ref Range    Sed Rate 69 (H) 0 - 29 mm/hour   C-reactive protein    Collection Time: 11/30/23  8:58 AM   Result Value Ref Range    CRP 44.3 (H) <3.0 mg/L   Angiotensin converting enzyme    Collection Time: 11/30/23  8:58 AM   Result Value Ref Range    Angio Convert Enzyme 84 (H) 14 - 82 U/L   Urinalysis with microscopic    Collection Time: 11/30/23  8:58 AM   Result Value Ref Range    Color, UA Yellow     Clarity, UA Turbid     Specific Gravity, UA 1.017 1.003 - 1.030    pH, UA 5.5 4.5, 5.0, 5.5, 6.0, 6.5, 7.0, 7.5, 8.0    Leukocytes, UA Moderate (A) Negative    Nitrite, UA Negative Negative    Protein, UA 50 (1+) (A) Negative mg/dl    Glucose, UA Negative Negative mg/dl    Ketones, UA Negative Negative mg/dl    Urobilinogen, UA <2.0 <2.0 mg/dl mg/dl    Bilirubin, UA Negative Negative    Occult Blood, UA Large (A) Negative    RBC, UA Innumerable (A) None Seen, 1-2 /hpf    WBC, UA Innumerable (A) None Seen, 1-2 /hpf    Epithelial Cells Occasional None Seen, Occasional /hpf    Bacteria, UA None Seen None Seen, Occasional /hpf    MUCUS THREADS Occasional (A) None Seen   Albumin / creatinine urine ratio    Collection Time: 11/30/23  8:58 AM   Result Value Ref Range    Creatinine, Ur 125.0 Reference range not established. mg/dL    Albumin,U,Random 193.5 (H) <20.0 mg/L    Albumin Creat Ratio 155 (H) 0 - 30 mg/g creatinine   Potassium, urine, random    Collection Time: 11/30/23  8:58 AM   Result Value Ref Range     Potassium Urine  92.6 Reference range not established. mmol/L   Sodium, urine, random    Collection Time: 11/30/23  8:58 AM   Result Value Ref Range    Sodium, Ur 21 Reference range not established.   Creatinine, urine, random    Collection Time: 11/30/23  8:58 AM   Result Value Ref Range    Creatinine, Ur 126.0 Reference range not established. mg/dL   Chloride, urine, random    Collection Time: 11/30/23  8:58 AM   Result Value Ref Range    Chloride, Ur 83 Reference range not established. mmol/L   Catecholamines, fractionated, plasma    Collection Time: 11/30/23  8:58 AM   Result Value Ref Range    Norepinephrine Plasma 545 0 - 874 pg/mL    Epinephrine Plasma 18 0 - 62 pg/mL    Dopamine Plasma <30 0 - 48 pg/mL   Magnesium    Collection Time: 11/30/23  8:58 AM   Result Value Ref Range    Magnesium 2.1 1.9 - 2.7 mg/dL   Aldosterone/Renin Ratio    Collection Time: 11/30/23  8:58 AM   Result Value Ref Range    Renin 1.216 0.167 - 5.380 ng/mL/hr    Aldosterone 26.5 0.0 - 30.0 ng/dL    Aldos/Renin Ratio 21.8 0.0 - 30.0   Cortisol Level, AM Specimen    Collection Time: 11/30/23  8:58 AM   Result Value Ref Range    Cortisol - AM 3.1 (L) 6.7 - 22.6 ug/dL   CBC and differential    Collection Time: 12/14/23  6:44 PM   Result Value Ref Range    WBC 8.97 4.31 - 10.16 Thousand/uL    RBC 4.51 3.81 - 5.12 Million/uL    Hemoglobin 11.3 (L) 11.5 - 15.4 g/dL    Hematocrit 36.6 34.8 - 46.1 %    MCV 81 (L) 82 - 98 fL    MCH 25.1 (L) 26.8 - 34.3 pg    MCHC 30.9 (L) 31.4 - 37.4 g/dL    RDW 15.1 11.6 - 15.1 %    MPV 9.1 8.9 - 12.7 fL    Platelets 280 149 - 390 Thousands/uL    nRBC 0 /100 WBCs    Neutrophils Relative 94 (H) 43 - 75 %    Immat GRANS % 0 0 - 2 %    Lymphocytes Relative 4 (L) 14 - 44 %    Monocytes Relative 2 (L) 4 - 12 %    Eosinophils Relative 0 0 - 6 %    Basophils Relative 0 0 - 1 %    Neutrophils Absolute 8.39 (H) 1.85 - 7.62 Thousands/µL    Immature Grans Absolute 0.04 0.00 - 0.20 Thousand/uL    Lymphocytes Absolute 0.32  (L) 0.60 - 4.47 Thousands/µL    Monocytes Absolute 0.20 0.17 - 1.22 Thousand/µL    Eosinophils Absolute 0.01 0.00 - 0.61 Thousand/µL    Basophils Absolute 0.01 0.00 - 0.10 Thousands/µL   Comprehensive metabolic panel    Collection Time: 12/14/23  6:44 PM   Result Value Ref Range    Sodium 137 135 - 147 mmol/L    Potassium 4.6 3.5 - 5.3 mmol/L    Chloride 107 96 - 108 mmol/L    CO2 23 21 - 32 mmol/L    ANION GAP 7 mmol/L    BUN 33 (H) 5 - 25 mg/dL    Creatinine 0.96 0.60 - 1.30 mg/dL    Glucose 134 65 - 140 mg/dL    Calcium 10.2 8.4 - 10.2 mg/dL    AST 24 13 - 39 U/L    ALT 40 7 - 52 U/L    Alkaline Phosphatase 317 (H) 34 - 104 U/L    Total Protein 7.5 6.4 - 8.4 g/dL    Albumin 4.2 3.5 - 5.0 g/dL    Total Bilirubin 0.50 0.20 - 1.00 mg/dL    eGFR 66 ml/min/1.73sq m   Lipase    Collection Time: 12/14/23  6:44 PM   Result Value Ref Range    Lipase 38 11 - 82 u/L   Smear Review(Phlebs Do Not Order)    Collection Time: 12/14/23  6:44 PM   Result Value Ref Range    Toxic Granulation Present     RBC Morphology Normal     Platelet Estimate Adequate Adequate   UA w Reflex to Microscopic w Reflex to Culture    Collection Time: 12/14/23  6:54 PM    Specimen: Urine, Clean Catch   Result Value Ref Range    Color, UA Dark Yellow     Clarity, UA Clear     Specific Gravity, UA 1.013 1.003 - 1.030    pH, UA 5.5 4.5, 5.0, 5.5, 6.0, 6.5, 7.0, 7.5, 8.0    Leukocytes, UA Small (A) Negative    Nitrite, UA Positive (A) Negative    Protein, UA 30 (1+) (A) Negative mg/dl    Glucose, UA Negative Negative mg/dl    Ketones, UA Negative Negative mg/dl    Urobilinogen, UA <2.0 <2.0 mg/dl mg/dl    Bilirubin, UA Negative Negative    Occult Blood, UA Large (A) Negative   Urine Microscopic    Collection Time: 12/14/23  6:54 PM   Result Value Ref Range    RBC, UA Innumerable (A) None Seen, 1-2 /hpf    WBC, UA 20-30 (A) None Seen, 1-2 /hpf    Epithelial Cells Occasional None Seen, Occasional /hpf    Bacteria, UA Occasional None Seen, Occasional /hpf     MUCUS THREADS Occasional (A) None Seen   Urine culture    Collection Time: 12/14/23  6:54 PM    Specimen: Urine, Clean Catch   Result Value Ref Range    Urine Culture No Growth <1000 cfu/mL    Lactic acid, plasma (w/reflex if result > 2.0)    Collection Time: 12/14/23  7:58 PM   Result Value Ref Range    LACTIC ACID 1.2 0.5 - 2.0 mmol/L   Blood culture #2    Collection Time: 12/14/23 10:40 PM    Specimen: Arm, Right; Blood   Result Value Ref Range    Blood Culture No Growth After 4 Days.    Blood culture #1    Collection Time: 12/15/23 12:14 AM    Specimen: Arm, Left; Blood   Result Value Ref Range    Blood Culture No Growth After 4 Days.    Urine culture    Collection Time: 12/15/23  1:11 PM    Specimen: Urine, Cystoscopic   Result Value Ref Range    Urine Culture No Growth <100 cfu/mL    CBC and differential    Collection Time: 12/16/23  5:36 AM   Result Value Ref Range    WBC 4.51 4.31 - 10.16 Thousand/uL    RBC 3.95 3.81 - 5.12 Million/uL    Hemoglobin 9.7 (L) 11.5 - 15.4 g/dL    Hematocrit 32.3 (L) 34.8 - 46.1 %    MCV 82 82 - 98 fL    MCH 24.6 (L) 26.8 - 34.3 pg    MCHC 30.0 (L) 31.4 - 37.4 g/dL    RDW 15.0 11.6 - 15.1 %    MPV 9.6 8.9 - 12.7 fL    Platelets 187 149 - 390 Thousands/uL    nRBC 0 /100 WBCs    Neutrophils Relative 88 (H) 43 - 75 %    Immat GRANS % 0 0 - 2 %    Lymphocytes Relative 6 (L) 14 - 44 %    Monocytes Relative 4 4 - 12 %    Eosinophils Relative 2 0 - 6 %    Basophils Relative 0 0 - 1 %    Neutrophils Absolute 3.93 1.85 - 7.62 Thousands/µL    Immature Grans Absolute 0.02 0.00 - 0.20 Thousand/uL    Lymphocytes Absolute 0.27 (L) 0.60 - 4.47 Thousands/µL    Monocytes Absolute 0.19 0.17 - 1.22 Thousand/µL    Eosinophils Absolute 0.09 0.00 - 0.61 Thousand/µL    Basophils Absolute 0.01 0.00 - 0.10 Thousands/µL   Basic metabolic panel    Collection Time: 12/16/23  5:36 AM   Result Value Ref Range    Sodium 140 135 - 147 mmol/L    Potassium 2.9 (L) 3.5 - 5.3 mmol/L    Chloride 105 96 - 108 mmol/L     CO2 27 21 - 32 mmol/L    ANION GAP 8 mmol/L    BUN 21 5 - 25 mg/dL    Creatinine 0.84 0.60 - 1.30 mg/dL    Glucose 78 65 - 140 mg/dL    Calcium 8.7 8.4 - 10.2 mg/dL    eGFR 77 ml/min/1.73sq m   Magnesium    Collection Time: 12/16/23  5:36 AM   Result Value Ref Range    Magnesium 1.4 (L) 1.9 - 2.7 mg/dL       Imaging Studies:FL retrograde pyelogram    Result Date: 12/16/2023  Narrative: BILATERAL RETROGRADE PYELOGRAM INDICATION:   Ureteral calculi. COMPARISON: 10/11/2023 IMAGES:  10 FLUOROSCOPY TIME:  27 SECONDS CONTRAST: 26 mL of iohexol (OMNIPAQUE) FINDINGS: Right ureteral stent exchange. Stone treatment on the left with insertion of the ureteral catheter. Osseous and soft tissue detail limited by technique. Ostomy device in the right lower quadrant.     Impression: Fluoroscopic guidance provided for bilateral retrograde pyelogram. Please see procedure report for further details. Workstation performed: [id number]     CT renal stone study abdomen pelvis without contrast    Result Date: 12/14/2023  Narrative: CT ABDOMEN AND PELVIS WITHOUT IV CONTRAST - LOW DOSE RENAL STONE INDICATION:   pelvic pain. History of malignant neoplasm of the rectum COMPARISON: CT chest, abdomen, and pelvis dated 12/11/2023 TECHNIQUE:  Low radiation dose thin section CT examination of the abdomen and pelvis was performed without intravenous or oral contrast according to a protocol specifically designed to evaluate for urinary tract calculus.  Axial, sagittal, and coronal 2D  reformatted images were created from the source data and submitted for interpretation.  Evaluation for pathology in the abdomen and pelvis that is unrelated to urinary tract calculi is limited. Radiation dose length product (DLP) for this visit:  131 mGy-cm .  This examination, like all CT scans performed in the [facility], was performed utilizing techniques to minimize radiation dose exposure, including the use of iterative reconstruction and  automated exposure control. URINARY TRACT FINDINGS: RIGHT KIDNEY AND URETER: Multiple subcentimeter nonobstructing stones in the right kidney. Right-sided nephroureteral stent in place. Kadi stent calcifications along the proximal portion redemonstrated. There is moderate right hydroureteronephrosis, this  appears intervally improved when compared to the prior. Right kidney otherwise appears grossly unremarkable. LEFT KIDNEY AND URETER: Several subcentimeter nonobstructing stones in the left kidney. Again seen is an obstructing stone in the mid/distal left ureter which measures approximately 0.5 x 0.5 cm in size. There is moderate left hydroureteronephrosis noted, similar to the prior. URINARY BLADDER: Right-sided nephroureteral stent terminates in the bladder; otherwise grossly unremarkable. ADDITIONAL FINDINGS: LOWER CHEST: Scarring/atelectasis in the left lower lung field. Several subcentimeter pulmonary nodules are redemonstrated, as before, better described on recent prior CT of the chest, abdomen, and pelvis. SOLID VISCERA: Benign-appearing subcentimeter nodule in the right adrenal gland. Limited low radiation dose noncontrast CT evaluation demonstrates no clinically significant abnormality of the imaged portions of the liver, spleen, pancreas, or adrenal glands. GALLBLADDER/BILIARY TREE:  No calcified gallstones. No pericholecystic inflammatory change.  No biliary dilatation. STOMACH AND BOWEL: Postsurgical changes in the rectum. Relatively underdistended colon. Loop ileostomy in the right lower quadrant no discrete evidence of bowel obstruction. APPENDIX:  No findings to suggest appendicitis. ABDOMINOPELVIC CAVITY:  No ascites.  No pneumoperitoneum.  No lymphadenopathy. VESSELS: Mild atherosclerosis; no aortic aneurysm. REPRODUCTIVE ORGANS:  Unremarkable for patient's age. ABDOMINAL WALL/INGUINAL REGIONS: Scarring in the anterior abdominal wall. OSSEOUS STRUCTURES:  No acute fracture or destructive osseous  lesion.     Impression: Bilateral nephrolithiasis. 5 mm obstructing stone in the mid/distal left ureter with moderate left hydroureteronephrosis, as before. Right-sided nephroureteral stent in place. Moderate right hydroureteronephrosis which appears intervally improved when compared to the prior. Postsurgical changes as described with right-sided ileostomy. No discrete evidence of bowel obstruction. Several subcentimeter pulmonary nodules are redemonstrated, as before, suspicious for pulmonary metastatic disease. Better described on recent prior CT of the chest, abdomen, and pelvis. Other findings as above. Workstation performed: [id number]     CT chest abdomen pelvis w contrast    Result Date: 12/14/2023  Narrative: CT CHEST, ABDOMEN AND PELVIS WITH IV CONTRAST INDICATION:   C20: Malignant neoplasm of rectum. COMPARISON: CT abdomen/pelvis dated 8/23/2023. CT chest/abdomen/pelvis dated 6/15/2023. TECHNIQUE: CT examination of the chest, abdomen and pelvis was performed. Multiplanar 2D reformatted images were created from the source data. This examination, like all CT scans performed in the [facility], was performed utilizing techniques to minimize radiation dose exposure, including the use of iterative reconstruction and automated exposure control. Radiation dose length product (DLP) for this visit:  914 mGy-cm IV Contrast:  100 mL of iohexol (OMNIPAQUE) Enteric Contrast: Enteric contrast was administered. FINDINGS: CHEST LUNGS: There are multiple new/enlarging pulmonary nodules worrisome for metastatic disease. Index lesions are as follows: 1.5 x 1.3 cm spiculated left upper lobe nodule (image 138, series 4), previously 1.2 x 0.8 cm 3 mm cavitary right lower lobe nodule (image 141, series 4), new 6 mm cavitary right lower lobe nodule (image 1666, series 4), new 4 mm cavitary left lower lobe nodule (image 168, series 4), new There is no tracheal or endobronchial lesion. PLEURA:  Unremarkable.  HEART/GREAT VESSELS: Heart is unremarkable for patient's age.  No thoracic aortic aneurysm. MEDIASTINUM AND DUSTY:  Unremarkable. CHEST WALL AND LOWER NECK: Mild nodularity of the right thyroid lobe, similar to prior. Correlate with thyroid ultrasound findings/recommendations dated 3/25/2023. ABDOMEN LIVER/BILIARY TREE:  Unremarkable. GALLBLADDER:  No calcified gallstones. No pericholecystic inflammatory change. SPLEEN: The spleen is top normal in size measuring up to 12 cm and is mildly heterogeneous with some nodularity, similar to prior. This is likely related to the patient's known history of sarcoidosis. PANCREAS:  Unremarkable. ADRENAL GLANDS: Stable 7 mm right adrenal gland nodule (image 110, series 2). KIDNEYS/URETERS: There is new moderate to severe right-sided hydroureteronephrosis to the level of the proximal ureter with stably positioned indwelling ureteral stent, the proximal portion of which is probably encrusted, similar to prior. There is also new moderate to severe left-sided hydroureteronephrosis to the level of a 6 mm obstructing calculus in the distal ureter (image 178, series 2). The left ureteral stent has been removed in the interim. Multiple nonobstructing bilateral intrarenal calculi measuring up to 6 mm on the left. STOMACH AND BOWEL: Stable postsurgical changes of low anterior resection of the colon. Right mid anterior abdominal ileostomy. APPENDIX:  No findings to suggest appendicitis. ABDOMINOPELVIC CAVITY:  No ascites.  No pneumoperitoneum. Mildly prominent retroperitoneal lymph nodes are similar to prior, nonspecific. VESSELS:  Unremarkable for patient's age. PELVIS REPRODUCTIVE ORGANS:  Unremarkable for patient's age. URINARY BLADDER:  Unremarkable. ABDOMINAL WALL/INGUINAL REGIONS:  Unremarkable. OSSEOUS STRUCTURES:  No acute fracture or destructive osseous lesion.     Impression: New moderate to severe left-sided hydroureteronephrosis to the level of a 6 mm obstructing distal ureteral  "calculus. The left ureteral stent has been removed in the interim. New moderate to severe right-sided hydroureteronephrosis to the level of the proximal ureter with stably positioned indwelling ureteral stent, the proximal portion of which is probably encrusted, similar to prior. Bilateral nephrolithiasis. Findings highly worrisome for pulmonary metastatic disease as evidenced by interval development of multiple new/enlarging pulmonary nodules, many of which are cavitary, in this patient with history of rectal adenocarcinoma. Cavitary pulmonary sarcoidosis  is considered less likely as it is very uncommon and is usually reported in those with severe and active disease. Spleen remains top normal in size with heterogeneous appearance and some degree of nodularity, similar to prior, likely related to the patient's known history of sarcoidosis. The study was marked in EPIC for immediate notification. Workstation performed: [id number]           Assessment/Plan:  Orders Placed This Encounter   Procedures    Ambulatory Referral to Interventional Radiology      We reviewed the patient's recent repeat CT chest/abdomen/pelvis in comparison to pretreatment imaging.  On my review this demonstrates multiple new and enlarging pulmonary nodules.  We discussed that this could be secondary to pulmonary sarcoidosis (as found pathologically at the time of initial VATS biopsy) or pulmonary metastases from her colorectal cancer.  Following this visit, discussion was additionally had with thoracic radiology who felt her findings were concerning enough to warrant biopsy. I will refer the patient to IR for possible biopsy of the largest CHANDANA lesion. Further recommendations will be made pending review of this pathology.     Edouard Ferrera MD  12/19/2023,12:35 PM    Portions of the record may have been created with voice recognition software.  Occasional wrong word or \"sound a like\" substitutions may have occurred due to the inherent " limitations of voice recognition software.  Read the chart carefully and recognize, using context, where substitutions have occurred.

## 2023-12-20 LAB
BACTERIA BLD CULT: NORMAL
BACTERIA BLD CULT: NORMAL

## 2024-01-02 ENCOUNTER — TELEPHONE (OUTPATIENT)
Dept: NEPHROLOGY | Facility: CLINIC | Age: 59
End: 2024-01-02

## 2024-01-02 ENCOUNTER — DOCUMENTATION (OUTPATIENT)
Dept: HEMATOLOGY ONCOLOGY | Facility: CLINIC | Age: 59
End: 2024-01-02

## 2024-01-02 NOTE — PROGRESS NOTES
In-basket message received from Dr. Ferrera to add patient to the thoracic [facility] on 1/8/2024. Chart reviewed and prep completed.

## 2024-01-02 NOTE — TELEPHONE ENCOUNTER
Spoke with pt contact, Mateo, to schedule follow up appt with Dr Reyes. Appt was scheduled on 3/28/24 in the [facility] at 10:30am.

## 2024-01-02 NOTE — TELEPHONE ENCOUNTER
----- Message from Joselyn Reyes Bahamonde, MD sent at 1/2/2024  8:25 AM EST -----  Can you confirm if patient has a f/u appointment with us.     Thanks

## 2024-01-03 ENCOUNTER — DOCUMENTATION (OUTPATIENT)
Dept: HEMATOLOGY ONCOLOGY | Facility: CLINIC | Age: 59
End: 2024-01-03

## 2024-01-03 NOTE — PROGRESS NOTES
Chart reviewed in preparation of Thoracic Tumor Conference presentation by Dr. Yobany Ferrera on 1/8/24.  Patient with a history of sarcoidosis who presented with Stage IIA (cT3N0) mid to high rectal adenocarcinoma. She was started on CapeOx in [place] and received 3 cycles with significant difficulty. She transferred her care to [facility] with decision to transition to chemoRT.  On 5/25/23 she completed a course of neoadjuvant chemoRT.  Recent imaging showed interval development of multiple new/enlarging pulmonary nodules.

## 2024-01-04 ENCOUNTER — OFFICE VISIT (OUTPATIENT)
Dept: GASTROENTEROLOGY | Facility: AMBULARY SURGERY CENTER | Age: 59
End: 2024-01-04
Payer: COMMERCIAL

## 2024-01-04 VITALS
HEART RATE: 101 BPM | DIASTOLIC BLOOD PRESSURE: 76 MMHG | OXYGEN SATURATION: 99 % | WEIGHT: 146 LBS | HEIGHT: 62 IN | SYSTOLIC BLOOD PRESSURE: 130 MMHG | BODY MASS INDEX: 26.87 KG/M2

## 2024-01-04 DIAGNOSIS — D86.9 SARCOIDOSIS: Primary | ICD-10-CM

## 2024-01-04 DIAGNOSIS — R74.8 ELEVATED LIVER ENZYMES: ICD-10-CM

## 2024-01-04 DIAGNOSIS — D86.89 HEPATIC GRANULOMA ASSOCIATED WITH SARCOIDOSIS: ICD-10-CM

## 2024-01-04 PROCEDURE — 99214 OFFICE O/P EST MOD 30 MIN: CPT | Performed by: STUDENT IN AN ORGANIZED HEALTH CARE EDUCATION/TRAINING PROGRAM

## 2024-01-04 NOTE — PROGRESS NOTES
[facility] - Outpatient Note  Danielle Haque 58 y.o. female MRN: [id number]  Unit/Bed#:  Encounter: [id number]          ASSESSMENT AND PLAN:      58 year old female with history significant for rectosigmoid cancer s/p laparoscopic diverting colostomy (12/2022) and adjuvant chemoXRT, recurrent NISHA, nephrolithiasis, biopsy-proven pulmonary sarcoidosis and hepatic sarcoidosis who presents for follow up.     She remains on Prednisone 15mg daily and UDCA 300mg TID with minimal improvement in her ALP. She is otherwise tolerating the above regimen without significant issues. Given she is already on high doses of UDCA, we unfortunately cannot further titrate up. Additional options would be to continue the current regimen with monitoring of her enzymes vs initiation of her another immunosuppressive medication such as Remicade or Methotrexate. We collectively decided to continue her current treatment regimen rather than starting other agents given her recent history of rectosigmoid cancer s/p diverting colostomy in December of 2022.    - Repeat CMP to monitor liver enzymes and ALP.  -Continue Prednisone 15mg and UDCA 300mg TID for now.    FOLLOW-UP:  Return in about 6 months (around 7/4/2024).    VISIT DIAGNOSES AND ORDERS:      1. Sarcoidosis    2. Elevated liver enzymes    3. Hepatic granuloma associated with sarcoidosis      Orders Placed This Encounter   Procedures    DXA bone density spine hip and pelvis    Comprehensive metabolic panel         ______________________________________________________________________    HPI:  Ms. Danielle Haque is a 58 year old female with history significant for rectosigmoid cancer s/p laparoscopic diverting colostomy (12/2022) and adjuvant chemoXRT, recurrent NISHA, nephrolithiasis, biopsy-proven pulmonary sarcoidosis and hepatic sarcoidosis who presents for follow up.     She was found to have mild macrovesicular steatosis without steatohepatitis or advanced fibrosis.  Pathology was suggestive of sclerotic hemangioma with secondary hemochromatosis and repeat liver biopsy showing hepatic involvement of her sarcoidosis. She was started on Prednisone and UDCA for immunosuppression with improvement in her liver enzymes.     Interval: She reports doing well overall. She does have some shortness of breath for couple of months. Denies heartburn, dysphagia, odynophagia, nausea, vomiting, diarrhea, constipation, BRBPR, melena, abdominal pain, change in bowel habits, unintentional weight loss.  She is currently on Prednisone 15mg daily and Ursodiol 300mg TID.    Prior colonoscopy (11/2023)- unremarkable.  No prior EGD.     REVIEW OF SYSTEMS:    10 point ROS reviewed and negative except otherwise noted in the HPI above.     Historical Information   Past Medical History:   Diagnosis Date    Cancer (HCC)     Colon cancer (HCC)     Fall     Headache     Hemochromatosis, unspecified     History of transfusion     2022 - no adverse reaction    Hypertension     Kidney calculi     Kidney stone     Liver disease     hemangioma    Muscle weakness     Personal history of COVID-19 12/2022    Sarcoidosis     Seizures (HCC)     2022    Shingles      Past Surgical History:   Procedure Laterality Date    ABSCESS DRAINAGE      left breast    BREAST BIOPSY      CHEST TUBE INSERTION      COLON SURGERY      colostomy    COLONOSCOPY      FL GUIDED CENTRAL VENOUS ACCESS DEVICE INSERTION  03/21/2023    FL RETROGRADE PYELOGRAM  01/23/2023    FL RETROGRADE PYELOGRAM  04/03/2023    FL RETROGRADE PYELOGRAM  7/21/2023    FL RETROGRADE PYELOGRAM  10/11/2023    FL RETROGRADE PYELOGRAM  12/15/2023    IR BIOPSY LIVER MASS  05/09/2023    LUNG BIOPSY      AL CYSTO BLADDER W/URETERAL CATHETERIZATION Bilateral 07/21/2023    Procedure: CYSTOSCOPY URETEROSCOPY RETROGRADE PYELOGRAM WITH BILATERAL STENT URETERAL EXCHANGE; LEFT LASER LITHOTRIPSY AND STONE BASKET RETRIEVAL;  Surgeon: José Luis Stephens MD;  [facility];  Service: Urology    MA CYSTO BLADDER W/URETERAL CATHETERIZATION Bilateral 12/15/2023    Procedure: CYSTOSCOPY, BILATERAL  RETROGRADE PYELOGRAM , STENT EXCHANGE RIGHT , LEFT URETEROSCOPY ,  HOLMIUM LASER WITH INSERTION LEFT URETERAL STENT;  Surgeon: Derick Bhakta MD;  [facility];  Service: Urology    MA CYSTO/URETERO W/LITHOTRIPSY &INDWELL STENT INSRT Bilateral 01/23/2023    Procedure: CYSTOSCOPY  RIGHT URETEROSCOPY WITH LITHOTRIPSY HOLMIUM LASER, BILATERAL RETROGRADE PYELOGRAM AND BILATERAL  URETERAL STENT INSERTION;  Surgeon: José Luis Stephens MD;  [facility];  Service: Urology    MA CYSTO/URETERO W/LITHOTRIPSY &INDWELL STENT INSRT Right 04/03/2023    Procedure: RIGHT URETEROSCOPY WITH LITHOTRIPSY HOLMIUM LASER, RETROGRADE PYELOGRAM; BILATERAL EXCHANGE STENT URETERAL;  Surgeon: José Luis Stephens MD;  [facility];  Service: Urology    MA CYSTO/URETERO W/LITHOTRIPSY &INDWELL STENT INSRT Bilateral 10/11/2023    Procedure: CYSTOSCOPY URETEROSCOPY RETROGRADE PYELOGRAM AND INSERTION STENT URETERAL DIAGNOSTINC RIGHT URETEROSCOPY, REMOVAL STENT LEFT SIDE;  Surgeon: José Luis Stephens MD;  [facility];  Service: Urology    MA INSJ TUNNELED CTR VAD W/SUBQ PORT AGE 5 YR/> N/A 03/21/2023    Procedure: INSERTION VENOUS PORT ( PORT-A-CATH) IR;  Surgeon: Mark Amos DO;  [facility];  Service: Interventional Radiology    MA LAPS COLECTOMY PRTL W/COLOPXTSTMY LW ANAST N/A 8/17/2023    Procedure: RESECTION COLON LOW ANTERIOR LAPAROSCOPIC WITH ROBOTICS;  Surgeon: Sree Umanzor MD;  [facility];  Service: Colorectal    TONSILLECTOMY      URINARY SURGERY Bilateral     bilateral stents     Social History   Social History     Substance and Sexual Activity   Alcohol Use Never     Social History     Substance and Sexual Activity   Drug Use Never     Social History     Tobacco Use   Smoking Status Never    Passive exposure: Past   Smokeless  "Tobacco Never     Family History   Problem Relation Age of Onset    Cancer Mother     Cirrhosis Father     Breast cancer Neg Hx     Breast cancer additional onset Neg Hx        Meds/Allergies       Current Outpatient Medications:     acetaminophen (TYLENOL) 500 mg tablet    amLODIPine (NORVASC) 10 mg tablet    cyanocobalamin (VITAMIN B-12) 1000 MCG tablet    oxybutynin (DITROPAN) 5 mg tablet    phenazopyridine (PYRIDIUM) 100 mg tablet    potassium chloride (MICRO-K) 10 MEQ CR capsule    ursodiol (ACTIGALL) 300 mg capsule    Allergies   Allergen Reactions    Oxaliplatin Shortness Of Breath     Reactions occurred with 2nd and 3rd infusions (about 1-3 hours from initiation of infusion) and required treatment with steroids and antihistamines. Please refer to allergy note on 2/7/2023 for detailed description of her reactions.    Morphine Nausea Only     \"Every dose made me nauseous\" (oral dosing only, can tolerate IV morphine)       Objective     Blood pressure 130/76, pulse 101, height 5' 2\" (1.575 m), weight 66.2 kg (146 lb), SpO2 99%.    PHYSICAL EXAM:    General: Well-appearing, NAD  Eyes:  no conjunctival icterus or pallor  Abdominal: Soft, non-tender, non-distended  Neuro: alert and oriented  Psych: Normal affect    Lab Results:   Lab Results   Component Value Date    K 2.9 (L) 12/16/2023    CO2 27 12/16/2023     12/16/2023    BUN 21 12/16/2023    CREATININE 0.84 12/16/2023     Lab Results   Component Value Date    WBC 4.51 12/16/2023    HGB 9.7 (L) 12/16/2023    HCT 32.3 (L) 12/16/2023    MCV 82 12/16/2023     12/16/2023     Lab Results   Component Value Date    TP 7.5 12/14/2023    AST 24 12/14/2023    ALT 40 12/14/2023    INR 1.03 11/30/2023      Lab Results   Component Value Date    IRON 50 06/20/2023    FERRITIN 27 06/30/2023     No results found for: \"CHOL\", \"HDL\", \"TRIG\", \"LDL\"    Radiology Results:   I have reviewed the results of any radiology studies performed within the last 90 days. "     Irene Rebolledo D.O.  Fellow, PGY-5  Division of Gastroenterology & Hepatology  Available on [facility]  1/4/2024 1:24 PM

## 2024-01-08 ENCOUNTER — DOCUMENTATION (OUTPATIENT)
Dept: HEMATOLOGY ONCOLOGY | Facility: CLINIC | Age: 59
End: 2024-01-08

## 2024-01-08 NOTE — PROGRESS NOTES
THORACIC ONCOLOGY MULTIDISCIPLINARY CASE REVIEW    DATE:  1/8/2024    PRESENTING DOCTOR:  Dr. Edouard Ferrera     DIAGNOSIS:  Lung Nodules  STAGING:     Danielle Haque is a 58 y.o. female who was presented at the Thoracic Oncology Multidisciplinary Tumor Conference today. She has  a history of sarcoidosis who presented with Stage IIA (cT3N0) mid to high rectal adenocarcinoma. She was started on CapeOx in [place] and received 3 cycles with significant difficulty. She transferred her care to [facility] with decision to transition to chemoRT.  On 5/25/23 she completed a course of neoadjuvant chemoRT.  Recent imaging showed interval development of multiple new/enlarging pulmonary nodules.        PHYSICIAN RECOMMENDED PLAN: Obtain CT chest abdomen pelvis contrast w contrast in 3 months, if enlargement of pulmonary nodules refer to Thoracic Surgery or IR for biopsy.       Imaging reviewed:   12/11/23 CT chest abdomen pelvis w contrast - There are multiple new/enlarging pulmonary nodules worrisome for metastatic disease. Index lesions are as follows:  1.5 x 1.3 cm spiculated left upper lobe nodule, previously 1.2 x 0.8 cm  3 mm cavitary right lower lobe nodule,  new  6 mm cavitary right lower lobe nodule,  new  4 mm cavitary left lower lobe nodule ,  new  There is no tracheal or endobronchial lesion.    6/15/23 CT chest abdomen pelvis w contrast  3/18/23 CT chest abdomen pelvis w contrast  10/25/22 CTA chest and abdomen w wo contrast  (outside image)  5/24/22  PET CT (outside image)  4/17/22 CT chest abdomen and pelvis with po contrast only (outside image)    Pathology reviewed: No    PFT's reviewed: No    Future imaging: CT chest abdomen pelvis w contrast     Referrals: No    Clinical Trials Reviewed:  No  Clinical Trial Eligibility:     Team agreed to plan.  NCCN guidelines were readily available for review at this discussion    The final treatment plan will be left to the discretion of the patient and the treating  physician.     DISCLAIMERS:  TO THE TREATING PHYSICIAN:  This conference is a meeting of clinicians from various specialty areas who evaluate and discuss patients for whom a multidisciplinary treatment approach is being considered. Please note that the above opinion was a consensus of the conference attendees and is intended only to assist in quality care of the discussed patient.  The responsibility for follow up on the input given during the conference, along with any final decisions regarding plan of care, is that of the patient and the patient's provider. Accordingly, appointments have only been recommended based on this information and have NOT been scheduled unless otherwise noted.      TO THE PATIENT:  This summary is a brief record of major aspects of your cancer treatment. You may choose to share a copy with any of your doctors or nurses. However, this is not a detailed or comprehensive record of your care.

## 2024-01-09 DIAGNOSIS — C20 RECTAL CANCER (HCC): Primary | ICD-10-CM

## 2024-01-09 NOTE — PROGRESS NOTES
Placed call to patient and her  Mateo regarding recommendations for lung tumor board. Based on discussion plan was for repeat CT C/A/P with contrast in 3 months. I will arrange for this in 3/2024 (3 months following prior CT) and will see her back thereafter to re-evaluate. I will remain available in the interim should any questions or concerns arise.     Edouard Ferrera MD  Dept of Radiation Oncology

## 2024-01-16 ENCOUNTER — HOSPITAL ENCOUNTER (OUTPATIENT)
Dept: RADIOLOGY | Facility: HOSPITAL | Age: 59
Discharge: HOME | End: 2024-01-16
Payer: COMMERCIAL

## 2024-01-16 ENCOUNTER — TELEPHONE (OUTPATIENT)
Age: 59
End: 2024-01-16

## 2024-01-16 ENCOUNTER — PROCEDURE VISIT (OUTPATIENT)
Dept: UROLOGY | Facility: CLINIC | Age: 59
End: 2024-01-16
Payer: COMMERCIAL

## 2024-01-16 VITALS
BODY MASS INDEX: 26.87 KG/M2 | WEIGHT: 146 LBS | HEIGHT: 62 IN | SYSTOLIC BLOOD PRESSURE: 130 MMHG | DIASTOLIC BLOOD PRESSURE: 78 MMHG

## 2024-01-16 DIAGNOSIS — Z96.0 RETAINED URETERAL STENT: Primary | ICD-10-CM

## 2024-01-16 DIAGNOSIS — Z96.0 RETAINED URETERAL STENT: ICD-10-CM

## 2024-01-16 PROCEDURE — 52310 CYSTOSCOPY AND TREATMENT: CPT | Performed by: PHYSICIAN ASSISTANT

## 2024-01-16 PROCEDURE — 74018 RADEX ABDOMEN 1 VIEW: CPT

## 2024-01-16 RX ORDER — CEPHALEXIN 500 MG/1
500 CAPSULE ORAL EVERY 8 HOURS SCHEDULED
Qty: 9 CAPSULE | Refills: 0 | Status: SHIPPED | OUTPATIENT
Start: 2024-01-16 | End: 2024-01-19

## 2024-01-16 NOTE — TELEPHONE ENCOUNTER
----- Message from Sherman Garcia PA-C sent at 1/16/2024  1:04 PM EST -----  I took her left stent out today.  She had her right stent exchanged December 15 by Dr. Bhakta.  So she would need a scheduled right stent and then again with Dr. Stephens in about 3 months.  Also could someone call her and let her know that her KUB looked good and showed her right stent to be in good position.

## 2024-01-16 NOTE — TELEPHONE ENCOUNTER
Spoke to Rudy per communication sheet and informed of KUB xray results from the direction of the AP review. She is having some residual stent removal discomfort today on the right side, however the stent removed today was on the left side. I explained this can occur after having the procedure performed today and she still actively has a stent in the right kidney. I gave her care advice to rest, hydrate well with water and to utilize a heating pad. Informed to call our office or go to ER if discomfort increases. Rudy is aware that the AP recommends a follow stent exchange with Dr. Stephens in 3 months and that this message will be sent to the surgical coordinator to arrange. He is aware this message will be sent to the AP as well due to discussion of post stent removal symptoms from today. Please advise of any further recommendations.

## 2024-01-16 NOTE — PROGRESS NOTES
Cystoscopy     Date/Time  1/16/2024 10:00 AM     Performed by  Sherman Garcia PA-C   Authorized by  Sherman Garcia PA-C     Universal Protocol:  Consent: Verbal consent obtained.  Consent given by: patient  Patient identity confirmed: verbally with patient      Procedure Details:  Procedure type: simple removal of a foreign body, stone, or stent    Patient tolerance: Patient tolerated the procedure well with no immediate complications    Additional Procedure Details: 58-year-old female with history of chronic right nephroureteral stent due to stricture.  She also has a history of kidney stones and had a left ureteral stone lasered and removed  December 15 by Dr. Bhakta.  Her right stent was also changed at that time.  She now comes in for cystoscopy stent removal of her left stent only.  The patient is placed in the dorsolithotomy position.  The groin is prepped and draped in the usual fashion.  2% lidocaine used for local anesthetic at her request.  The flexible cystoscope was passed per the meatus.  Upon entering the bladder and after adequate filling.  Both stents were identified.  The left stent was followed to its orifice then grasped with forceps and removed without difficulty.  At that time the scope was reinserted to identify the right stent emanating from its orifice.  There was quite a bit of reaction around the opening normal exam.  Urine is here.  Prophylactic antibiotics were sent to her pharmacy.  Following the procedure KUB was done confirming the presence of the right stent.  She has a CAT scan scheduled by the radiation oncologist in March.  Will follow-up to assure resolution of any residual hydronephrosis.

## 2024-01-18 LAB — SCAN RESULT: NORMAL

## 2024-01-19 ENCOUNTER — PREP FOR PROCEDURE (OUTPATIENT)
Dept: UROLOGY | Facility: AMBULATORY SURGERY CENTER | Age: 59
End: 2024-01-19

## 2024-01-19 DIAGNOSIS — Z96.0 RETAINED URETERAL STENT: Primary | ICD-10-CM

## 2024-01-19 DIAGNOSIS — R39.89 SUSPECTED UTI: ICD-10-CM

## 2024-01-19 NOTE — TELEPHONE ENCOUNTER
Spoke with Mateo to schedule Danielle's next stent exchange. I offered date of 04/12/24 at [facility] with Dr. Stephens. Mateo was satisfied with given date and location. I did let Mateo know that patient will need pre-op urine culture done prior to procedure which will need to be done on 03/29/24. All pre-op instructions were reviewed with Mateo. Surgery packet and lab scripts will be mailed to patients address on file. Mateo verbalized understanding. I did ask if any future questions and concerns to please give office a call.

## 2024-01-31 ENCOUNTER — APPOINTMENT (OUTPATIENT)
Dept: LAB | Facility: AMBULARY SURGERY CENTER | Age: 59
End: 2024-01-31
Payer: COMMERCIAL

## 2024-01-31 DIAGNOSIS — R74.8 ELEVATED LIVER ENZYMES: ICD-10-CM

## 2024-01-31 DIAGNOSIS — D86.9 SARCOIDOSIS: ICD-10-CM

## 2024-01-31 DIAGNOSIS — D86.89 HEPATIC GRANULOMA ASSOCIATED WITH SARCOIDOSIS: ICD-10-CM

## 2024-02-01 NOTE — PLAN OF CARE
Problem: INFECTION - ADULT  Goal: Absence or prevention of progression during hospitalization  Description: INTERVENTIONS:  - Assess and monitor for signs and symptoms of infection  - Monitor lab/diagnostic results  - Monitor all insertion sites, i e  indwelling lines, tubes, and drains  - Monitor endotracheal if appropriate and nasal secretions for changes in amount and color  - [place] appropriate cooling/warming therapies per order  - Administer medications as ordered  - Instruct and encourage patient and family to use good hand hygiene technique  - Identify and instruct in appropriate isolation precautions for identified infection/condition  Outcome: Progressing     Problem: DISCHARGE PLANNING  Goal: Discharge to home or other facility with appropriate resources  Description: INTERVENTIONS:  - Identify barriers to discharge w/patient and caregiver  - Arrange for needed discharge resources and transportation as appropriate  - Identify discharge learning needs (meds, wound care, etc )  - Arrange for interpretive services to assist at discharge as needed  - Refer to Case Management Department for coordinating discharge planning if the patient needs post-hospital services based on physician/advanced practitioner order or complex needs related to functional status, cognitive ability, or social support system  Outcome: Progressing No

## 2024-02-02 ENCOUNTER — HOSPITAL ENCOUNTER (OUTPATIENT)
Dept: INFUSION CENTER | Facility: CLINIC | Age: 59
Discharge: HOME | End: 2024-02-02
Payer: COMMERCIAL

## 2024-02-02 DIAGNOSIS — D86.9 SARCOIDOSIS: ICD-10-CM

## 2024-02-02 DIAGNOSIS — R74.8 ELEVATED LIVER ENZYMES: ICD-10-CM

## 2024-02-02 DIAGNOSIS — Z95.828 PORT-A-CATH IN PLACE: Primary | ICD-10-CM

## 2024-02-02 DIAGNOSIS — D86.89 HEPATIC GRANULOMA ASSOCIATED WITH SARCOIDOSIS: Primary | ICD-10-CM

## 2024-02-02 PROCEDURE — 96523 IRRIG DRUG DELIVERY DEVICE: CPT

## 2024-02-02 RX ORDER — FENOFIBRATE 145 MG/1
145 TABLET, COATED ORAL DAILY
Qty: 90 TABLET | Refills: 3 | Status: SHIPPED | OUTPATIENT
Start: 2024-02-02 | End: 2025-02-01

## 2024-02-02 NOTE — PROGRESS NOTES
Pt here for port flush, offers no complaints, port accessed and flushed per protocol, next appointment scheduled for 3/29 at 1300, declined AVS

## 2024-02-07 ENCOUNTER — HOSPITAL ENCOUNTER (INPATIENT)
Facility: HOSPITAL | Age: 59
LOS: 2 days | Discharge: HOME | DRG: 446 | End: 2024-02-09
Attending: EMERGENCY MEDICINE | Admitting: INTERNAL MEDICINE
Payer: COMMERCIAL

## 2024-02-07 ENCOUNTER — APPOINTMENT (EMERGENCY)
Dept: ULTRASOUND IMAGING | Facility: HOSPITAL | Age: 59
DRG: 446 | End: 2024-02-07
Payer: COMMERCIAL

## 2024-02-07 ENCOUNTER — APPOINTMENT (EMERGENCY)
Dept: CT IMAGING | Facility: HOSPITAL | Age: 59
DRG: 446 | End: 2024-02-07
Payer: COMMERCIAL

## 2024-02-07 DIAGNOSIS — N13.2 URETERAL STONE WITH HYDRONEPHROSIS: ICD-10-CM

## 2024-02-07 DIAGNOSIS — N20.1 URETERAL CALCULI: Primary | ICD-10-CM

## 2024-02-07 DIAGNOSIS — N20.1 URETERAL CALCULI: ICD-10-CM

## 2024-02-07 DIAGNOSIS — N30.01 ACUTE CYSTITIS WITH HEMATURIA: ICD-10-CM

## 2024-02-07 DIAGNOSIS — N39.0 UTI (URINARY TRACT INFECTION): ICD-10-CM

## 2024-02-07 LAB
ANION GAP SERPL CALCULATED.3IONS-SCNC: 9 MMOL/L
ANISOCYTOSIS BLD QL SMEAR: PRESENT
BACTERIA UR QL AUTO: ABNORMAL /HPF
BASOPHILS # BLD MANUAL: 0 THOUSAND/UL (ref 0–0.1)
BASOPHILS NFR MAR MANUAL: 0 % (ref 0–1)
BILIRUB UR QL STRIP: NEGATIVE
BUN SERPL-MCNC: 30 MG/DL (ref 5–25)
CALCIUM SERPL-MCNC: 10.1 MG/DL (ref 8.4–10.2)
CHLORIDE SERPL-SCNC: 107 MMOL/L (ref 96–108)
CLARITY UR: ABNORMAL
CO2 SERPL-SCNC: 22 MMOL/L (ref 21–32)
COLOR UR: ABNORMAL
CREAT SERPL-MCNC: 1.17 MG/DL (ref 0.6–1.3)
EOSINOPHIL # BLD MANUAL: 0 THOUSAND/UL (ref 0–0.4)
EOSINOPHIL NFR BLD MANUAL: 0 % (ref 0–6)
ERYTHROCYTE [DISTWIDTH] IN BLOOD BY AUTOMATED COUNT: 16.2 % (ref 11.6–15.1)
GFR SERPL CREATININE-BSD FRML MDRD: 52 ML/MIN/1.73SQ M
GLUCOSE SERPL-MCNC: 133 MG/DL (ref 65–140)
GLUCOSE UR STRIP-MCNC: NEGATIVE MG/DL
HCT VFR BLD AUTO: 35.6 % (ref 34.8–46.1)
HGB BLD-MCNC: 11.2 G/DL (ref 11.5–15.4)
HGB UR QL STRIP.AUTO: ABNORMAL
KETONES UR STRIP-MCNC: NEGATIVE MG/DL
LEUKOCYTE ESTERASE UR QL STRIP: ABNORMAL
LYMPHOCYTES # BLD AUTO: 0.56 THOUSAND/UL (ref 0.6–4.47)
LYMPHOCYTES # BLD AUTO: 8 % (ref 14–44)
MCH RBC QN AUTO: 25.7 PG (ref 26.8–34.3)
MCHC RBC AUTO-ENTMCNC: 31.5 G/DL (ref 31.4–37.4)
MCV RBC AUTO: 82 FL (ref 82–98)
MONOCYTES # BLD AUTO: 0.21 THOUSAND/UL (ref 0–1.22)
MONOCYTES NFR BLD: 3 % (ref 4–12)
NEUTROPHILS # BLD MANUAL: 6.27 THOUSAND/UL (ref 1.85–7.62)
NEUTS BAND NFR BLD MANUAL: 2 % (ref 0–8)
NEUTS SEG NFR BLD AUTO: 87 % (ref 43–75)
NITRITE UR QL STRIP: NEGATIVE
NON-SQ EPI CELLS URNS QL MICRO: ABNORMAL /HPF
PH UR STRIP.AUTO: 6 [PH]
PLATELET # BLD AUTO: 193 THOUSANDS/UL (ref 149–390)
PLATELET BLD QL SMEAR: ADEQUATE
PMV BLD AUTO: 9.1 FL (ref 8.9–12.7)
POTASSIUM SERPL-SCNC: 4.9 MMOL/L (ref 3.5–5.3)
PROT UR STRIP-MCNC: ABNORMAL MG/DL
RBC # BLD AUTO: 4.35 MILLION/UL (ref 3.81–5.12)
RBC #/AREA URNS AUTO: ABNORMAL /HPF
RBC MORPH BLD: PRESENT
SODIUM SERPL-SCNC: 138 MMOL/L (ref 135–147)
SP GR UR STRIP.AUTO: 1.02 (ref 1–1.03)
UROBILINOGEN UR STRIP-ACNC: <2 MG/DL
WBC # BLD AUTO: 7.04 THOUSAND/UL (ref 4.31–10.16)
WBC #/AREA URNS AUTO: ABNORMAL /HPF

## 2024-02-07 PROCEDURE — 96375 TX/PRO/DX INJ NEW DRUG ADDON: CPT

## 2024-02-07 PROCEDURE — 76775 US EXAM ABDO BACK WALL LIM: CPT

## 2024-02-07 PROCEDURE — 80048 BASIC METABOLIC PNL TOTAL CA: CPT | Performed by: EMERGENCY MEDICINE

## 2024-02-07 PROCEDURE — 84145 PROCALCITONIN (PCT): CPT | Performed by: NURSE PRACTITIONER

## 2024-02-07 PROCEDURE — G1004 CDSM NDSC: HCPCS

## 2024-02-07 PROCEDURE — 85007 BL SMEAR W/DIFF WBC COUNT: CPT | Performed by: EMERGENCY MEDICINE

## 2024-02-07 PROCEDURE — 87086 URINE CULTURE/COLONY COUNT: CPT | Performed by: EMERGENCY MEDICINE

## 2024-02-07 PROCEDURE — 99285 EMERGENCY DEPT VISIT HI MDM: CPT | Performed by: EMERGENCY MEDICINE

## 2024-02-07 PROCEDURE — 96365 THER/PROPH/DIAG IV INF INIT: CPT

## 2024-02-07 PROCEDURE — 74176 CT ABD & PELVIS W/O CONTRAST: CPT

## 2024-02-07 PROCEDURE — 99284 EMERGENCY DEPT VISIT MOD MDM: CPT

## 2024-02-07 PROCEDURE — 36415 COLL VENOUS BLD VENIPUNCTURE: CPT | Performed by: EMERGENCY MEDICINE

## 2024-02-07 PROCEDURE — 81001 URINALYSIS AUTO W/SCOPE: CPT | Performed by: EMERGENCY MEDICINE

## 2024-02-07 PROCEDURE — 85027 COMPLETE CBC AUTOMATED: CPT | Performed by: EMERGENCY MEDICINE

## 2024-02-07 RX ORDER — ACETAMINOPHEN 10 MG/ML
1000 INJECTION, SOLUTION INTRAVENOUS ONCE
Status: COMPLETED | OUTPATIENT
Start: 2024-02-07 | End: 2024-02-07

## 2024-02-07 RX ORDER — CEFUROXIME AXETIL 250 MG/1
500 TABLET ORAL ONCE
Status: COMPLETED | OUTPATIENT
Start: 2024-02-07 | End: 2024-02-07

## 2024-02-07 RX ORDER — PHENAZOPYRIDINE HYDROCHLORIDE 100 MG/1
100 TABLET, FILM COATED ORAL ONCE
Status: COMPLETED | OUTPATIENT
Start: 2024-02-07 | End: 2024-02-07

## 2024-02-07 RX ORDER — TAMSULOSIN HYDROCHLORIDE 0.4 MG/1
0.4 CAPSULE ORAL ONCE
Status: COMPLETED | OUTPATIENT
Start: 2024-02-07 | End: 2024-02-07

## 2024-02-07 RX ORDER — TAMSULOSIN HYDROCHLORIDE 0.4 MG/1
0.4 CAPSULE ORAL
Status: DISCONTINUED | OUTPATIENT
Start: 2024-02-08 | End: 2024-02-09 | Stop reason: HOSPADM

## 2024-02-07 RX ORDER — HYDROMORPHONE HCL/PF 1 MG/ML
0.5 SYRINGE (ML) INJECTION ONCE AS NEEDED
Status: COMPLETED | OUTPATIENT
Start: 2024-02-07 | End: 2024-02-07

## 2024-02-07 RX ORDER — KETOROLAC TROMETHAMINE 30 MG/ML
15 INJECTION, SOLUTION INTRAMUSCULAR; INTRAVENOUS ONCE
Status: DISCONTINUED | OUTPATIENT
Start: 2024-02-07 | End: 2024-02-07

## 2024-02-07 RX ADMIN — HYDROMORPHONE HYDROCHLORIDE 0.5 MG: 1 INJECTION, SOLUTION INTRAMUSCULAR; INTRAVENOUS; SUBCUTANEOUS at 23:57

## 2024-02-07 RX ADMIN — ACETAMINOPHEN 1000 MG: 10 INJECTION INTRAVENOUS at 21:26

## 2024-02-07 RX ADMIN — PHENAZOPYRIDINE 100 MG: 100 TABLET ORAL at 21:26

## 2024-02-07 RX ADMIN — SODIUM CHLORIDE 1000 ML: 0.9 INJECTION, SOLUTION INTRAVENOUS at 21:27

## 2024-02-07 RX ADMIN — CEFUROXIME AXETIL 500 MG: 250 TABLET ORAL at 18:50

## 2024-02-07 RX ADMIN — CEFTRIAXONE SODIUM 1000 MG: 10 INJECTION, POWDER, FOR SOLUTION INTRAVENOUS at 22:45

## 2024-02-07 RX ADMIN — TAMSULOSIN HYDROCHLORIDE 0.4 MG: 0.4 CAPSULE ORAL at 22:45

## 2024-02-07 NOTE — ED PROVIDER NOTES
"History  Chief Complaint   Patient presents with    Possible UTI     Pt reports difficulty urinating, burning, blood in urine, \"genital pain\". Reports she thinks she has problem with kidney. Denies abd pain.     58-year-old female coming into the ED for evaluation of dysuria, frequency, urgency, hematuria ongoing for 1 to 2 weeks.  She has a history of kidney stones and currently has a right sided ureteral stent in place.  She came into the ED back in December, about 6 weeks ago and had a left sided kidney stone lasered and a left ureteral stent removed.  She still has the right-sided ureteral stent in place.  She denies any flank pain, no fevers or chills, no vomiting, nausea or diarrhea.      History provided by:  Patient   used: No        Prior to Admission Medications   Prescriptions Last Dose Informant Patient Reported? Taking?   acetaminophen (TYLENOL) 500 mg tablet  Self No No   Sig: Take 2 tablets (1,000 mg total) by mouth 3 (three) times a day   amLODIPine (NORVASC) 10 mg tablet  Self No No   Sig: Take 1 tablet (10 mg total) by mouth daily   cyanocobalamin (VITAMIN B-12) 1000 MCG tablet  Self Yes No   Sig: Take 1,000 mcg by mouth daily   fenofibrate (TRICOR) 145 mg tablet   No No   Sig: Take 1 tablet (145 mg total) by mouth daily   oxybutynin (DITROPAN) 5 mg tablet  Self No No   Sig: Take 1 tablet (5 mg total) by mouth 3 (three) times a day as needed (bladder spasms)   phenazopyridine (PYRIDIUM) 100 mg tablet  Self No No   Sig: Take 1 tablet (100 mg total) by mouth 3 (three) times a day with meals   potassium chloride (MICRO-K) 10 MEQ CR capsule  Self No No   Sig: Take 4 capsules (40 mEq total) by mouth 2 (two) times a day   ursodiol (ACTIGALL) 300 mg capsule  Self No No   Sig: Take 3 capsules (900 mg total) by mouth in the morning      Facility-Administered Medications: None       Past Medical History:   Diagnosis Date    Cancer (HCC)     Colon cancer (HCC)     Fall     Headache     " Hemochromatosis, unspecified     History of transfusion     2022 - no adverse reaction    Hypertension     Kidney calculi     Kidney stone     Liver disease     hemangioma    Muscle weakness     Personal history of COVID-19 12/2022    Sarcoidosis     Seizures (HCC)     2022    Shingles        Past Surgical History:   Procedure Laterality Date    ABSCESS DRAINAGE      left breast    BREAST BIOPSY      CHEST TUBE INSERTION      COLON SURGERY      colostomy    COLONOSCOPY      FL GUIDED CENTRAL VENOUS ACCESS DEVICE INSERTION  03/21/2023    FL RETROGRADE PYELOGRAM  01/23/2023    FL RETROGRADE PYELOGRAM  04/03/2023    FL RETROGRADE PYELOGRAM  7/21/2023    FL RETROGRADE PYELOGRAM  10/11/2023    FL RETROGRADE PYELOGRAM  12/15/2023    FL RETROGRADE PYELOGRAM  2/8/2024    IR BIOPSY LIVER MASS  05/09/2023    LUNG BIOPSY      AK CYSTO BLADDER W/URETERAL CATHETERIZATION Bilateral 07/21/2023    Procedure: CYSTOSCOPY URETEROSCOPY RETROGRADE PYELOGRAM WITH BILATERAL STENT URETERAL EXCHANGE; LEFT LASER LITHOTRIPSY AND STONE BASKET RETRIEVAL;  Surgeon: José Luis Stephens MD;  [facility];  Service: Urology    AK CYSTO BLADDER W/URETERAL CATHETERIZATION Bilateral 12/15/2023    Procedure: CYSTOSCOPY, BILATERAL  RETROGRADE PYELOGRAM , STENT EXCHANGE RIGHT , LEFT URETEROSCOPY ,  HOLMIUM LASER WITH INSERTION LEFT URETERAL STENT;  Surgeon: Derick Bhakta MD;  [facility];  Service: Urology    AK CYSTO BLADDER W/URETERAL CATHETERIZATION Left 2/8/2024    Procedure: CYSTOSCOPY B/L RETROGRADE PYELOGRAM WITH B/L T URETERALSTENT EXCHANGE,LEFT URETEROSCOPY, DILATION LEFT URETERAL STICTURE;  Surgeon: José Luis Stephens MD;  [facility];  Service: Urology    AK CYSTO/URETERO W/LITHOTRIPSY &INDWELL STENT INSRT Bilateral 01/23/2023    Procedure: CYSTOSCOPY  RIGHT URETEROSCOPY WITH LITHOTRIPSY HOLMIUM LASER, BILATERAL RETROGRADE PYELOGRAM AND BILATERAL  URETERAL STENT INSERTION;  Surgeon: José Luis  MD Angelo;  [facility];  Service: Urology    NH CYSTO/URETERO W/LITHOTRIPSY &INDWELL STENT INSRT Right 04/03/2023    Procedure: RIGHT URETEROSCOPY WITH LITHOTRIPSY HOLMIUM LASER, RETROGRADE PYELOGRAM; BILATERAL EXCHANGE STENT URETERAL;  Surgeon: José Luis Stephens MD;  [facility];  Service: Urology    NH CYSTO/URETERO W/LITHOTRIPSY &INDWELL STENT INSRT Bilateral 10/11/2023    Procedure: CYSTOSCOPY URETEROSCOPY RETROGRADE PYELOGRAM AND INSERTION STENT URETERAL DIAGNOSTINC RIGHT URETEROSCOPY, REMOVAL STENT LEFT SIDE;  Surgeon: José Luis Stephens MD;  [facility];  Service: Urology    NH INSJ TUNNELED CTR VAD W/SUBQ PORT AGE 5 YR/> N/A 03/21/2023    Procedure: INSERTION VENOUS PORT ( PORT-A-CATH) IR;  Surgeon: Mark Amos DO;  Location: [facility];  Service: Interventional Radiology    NH LAPS COLECTOMY PRTL W/COLOPXTSTMY LW ANAST N/A 8/17/2023    Procedure: RESECTION COLON LOW ANTERIOR LAPAROSCOPIC WITH ROBOTICS;  Surgeon: Sree Umanzor MD;  [facility];  Service: Colorectal    TONSILLECTOMY      URINARY SURGERY Bilateral     bilateral stents       Family History   Problem Relation Age of Onset    Cancer Mother     Cirrhosis Father     Breast cancer Neg Hx     Breast cancer additional onset Neg Hx      I have reviewed and agree with the history as documented.    E-Cigarette/Vaping    E-Cigarette Use Never User      E-Cigarette/Vaping Substances    Nicotine No     THC No     CBD No     Flavoring No     Other No     Unknown No      Social History     Tobacco Use    Smoking status: Never     Passive exposure: Past    Smokeless tobacco: Never   Vaping Use    Vaping status: Never Used   Substance Use Topics    Alcohol use: Never    Drug use: Never       Review of Systems   Gastrointestinal:  Negative for abdominal pain, nausea and vomiting.   Genitourinary:  Positive for dysuria, frequency, hematuria and urgency.   All other systems reviewed and are  negative.      Physical Exam  Physical Exam  Vitals and nursing note reviewed.   Constitutional:       General: She is not in acute distress.     Appearance: Normal appearance. She is well-developed and normal weight. She is not ill-appearing, toxic-appearing or diaphoretic.   HENT:      Head: Normocephalic and atraumatic.      Right Ear: External ear normal.      Left Ear: External ear normal.      Nose: Nose normal.      Mouth/Throat:      Mouth: Mucous membranes are moist.      Pharynx: Oropharynx is clear.   Eyes:      Conjunctiva/sclera: Conjunctivae normal.   Cardiovascular:      Rate and Rhythm: Normal rate and regular rhythm.      Pulses: Normal pulses.      Heart sounds: Normal heart sounds.   Pulmonary:      Effort: Pulmonary effort is normal.      Breath sounds: Normal breath sounds.   Abdominal:      General: Abdomen is flat. Bowel sounds are normal. There is no distension or abdominal bruit. There are no signs of injury.      Palpations: Abdomen is soft. There is no shifting dullness.      Tenderness: There is abdominal tenderness in the suprapubic area. There is no right CVA tenderness or left CVA tenderness.   Genitourinary:     Adnexa: Right adnexa normal and left adnexa normal.   Musculoskeletal:         General: Normal range of motion.      Cervical back: Normal range of motion and neck supple.   Skin:     General: Skin is warm and dry.      Capillary Refill: Capillary refill takes less than 2 seconds.   Neurological:      General: No focal deficit present.      Mental Status: She is alert and oriented to person, place, and time. Mental status is at baseline.   Psychiatric:         Mood and Affect: Mood normal.         Behavior: Behavior normal.         Vital Signs  ED Triage Vitals   Temperature Pulse Respirations Blood Pressure SpO2   02/07/24 1611 02/07/24 1611 02/07/24 1611 02/07/24 1611 02/07/24 1611   97.8 °F (36.6 °C) 83 18 162/86 98 %      Temp Source Heart Rate Source Patient Position -  Orthostatic VS BP Location FiO2 (%)   02/07/24 1611 02/07/24 1611 02/07/24 1611 02/07/24 1611 --   Oral Monitor Lying Right arm       Pain Score       02/07/24 1920       No Pain           Vitals:    02/08/24 1413 02/08/24 1525 02/08/24 2218 02/09/24 0819   BP: 127/69 121/58 135/70 134/67   Pulse:    81   Patient Position - Orthostatic VS:             Visual Acuity      ED Medications  Medications   tamsulosin (FLOMAX) capsule 0.4 mg (0.4 mg Oral Given 2/8/24 1555)   acetaminophen (TYLENOL) tablet 650 mg ( Oral MAR Unhold 2/8/24 1246)   amLODIPine (NORVASC) tablet 10 mg (10 mg Oral Given 2/9/24 0818)   cyanocobalamin (VITAMIN B-12) tablet 1,000 mcg (1,000 mcg Oral Given 2/9/24 0818)   fenofibrate (TRICOR) tablet 145 mg (145 mg Oral Given 2/9/24 0818)   ursodiol (ACTIGALL) capsule 900 mg (900 mg Oral Given 2/9/24 0820)   oxybutynin (DITROPAN) tablet 5 mg (5 mg Oral Given 2/8/24 1937)   ceftriaxone (ROCEPHIN) 1 g/50 mL in dextrose IVPB (1,000 mg Intravenous New Bag 2/9/24 0133)   vancomycin (VANCOCIN) oral solution 125 mg (125 mg Oral Given 2/9/24 0829)   lactated ringers infusion (125 mL/hr Intravenous New Bag 2/8/24 1425)   HYDROmorphone (DILAUDID) tablet 2 mg (2 mg Oral Given 2/9/24 0133)   cefuroxime (CEFTIN) tablet 500 mg (500 mg Oral Given 2/7/24 1850)   phenazopyridine (PYRIDIUM) tablet 100 mg (100 mg Oral Given 2/7/24 2126)   sodium chloride 0.9 % bolus 1,000 mL (0 mL Intravenous Stopped 2/7/24 2233)   acetaminophen (Ofirmev) injection 1,000 mg (0 mg Intravenous Stopped 2/7/24 2220)   ceftriaxone (ROCEPHIN) 1 g/50 mL in dextrose IVPB (1,000 mg Intravenous New Bag 2/7/24 2245)   tamsulosin (FLOMAX) capsule 0.4 mg (0.4 mg Oral Given 2/7/24 2095)   HYDROmorphone (DILAUDID) injection 0.5 mg (0.5 mg Intravenous Given 2/7/24 2808)   ceFAZolin (ANCEF) IVPB (premix in dextrose) 1,000 mg 50 mL (1,000 mg Intravenous Given 2/8/24 1228)       Diagnostic Studies  Results Reviewed       Procedure Component Value Units  Date/Time    Urine culture [[id number]] Collected: 02/07/24 1621    Lab Status: Final result Specimen: Urine, Clean Catch Updated: 02/08/24 1944     Urine Culture 10,000-19,000 cfu/ml    Procalcitonin [[id number]]  (Normal) Collected: 02/07/24 2047    Lab Status: Final result Specimen: Blood from Arm, Right Updated: 02/08/24 0148     Procalcitonin 0.08 ng/ml     RBC Morphology Reflex Test [[id number]] Collected: 02/07/24 2047    Lab Status: Final result Specimen: Blood from Arm, Right Updated: 02/07/24 2202    CBC and differential [[id number]]  (Abnormal) Collected: 02/07/24 2047    Lab Status: Final result Specimen: Blood from Arm, Right Updated: 02/07/24 2119     WBC 7.04 Thousand/uL      RBC 4.35 Million/uL      Hemoglobin 11.2 g/dL      Hematocrit 35.6 %      MCV 82 fL      MCH 25.7 pg      MCHC 31.5 g/dL      RDW 16.2 %      MPV 9.1 fL      Platelets 193 Thousands/uL     Narrative:      This is an appended report.  These results have been appended to a previously verified report.    Manual Differential(PHLEBS Do Not Order) [[id number]]  (Abnormal) Collected: 02/07/24 2047    Lab Status: Final result Specimen: Blood from Arm, Right Updated: 02/07/24 2119     Segmented % 87 %      Bands % 2 %      Lymphocytes % 8 %      Monocytes % 3 %      Eosinophils, % 0 %      Basophils % 0 %      Absolute Neutrophils 6.27 Thousand/uL      Lymphocytes Absolute 0.56 Thousand/uL      Monocytes Absolute 0.21 Thousand/uL      Eosinophils Absolute 0.00 Thousand/uL      Basophils Absolute 0.00 Thousand/uL      Total Counted --     RBC Morphology Present     Platelet Estimate Adequate     Anisocytosis Present    Basic metabolic panel [[id number]]  (Abnormal) Collected: 02/07/24 2047    Lab Status: Final result Specimen: Blood from Arm, Right Updated: 02/07/24 2114     Sodium 138 mmol/L      Potassium 4.9 mmol/L      Chloride 107 mmol/L      CO2 22 mmol/L      ANION GAP 9 mmol/L      BUN 30 mg/dL      Creatinine 1.17 mg/dL      Glucose 133  mg/dL      Calcium 10.1 mg/dL      eGFR 52 ml/min/1.73sq m     Narrative:      National Kid[employer] guidelines for Chronic Kidney Disease (CKD):     Stage 1 with normal or high GFR (GFR > 90 mL/min/1.73 square meters)    Stage 2 Mild CKD (GFR = 60-89 mL/min/1.73 square meters)    Stage 3A Moderate CKD (GFR = 45-59 mL/min/1.73 square meters)    Stage 3B Moderate CKD (GFR = 30-44 mL/min/1.73 square meters)    Stage 4 Severe CKD (GFR = 15-29 mL/min/1.73 square meters)    Stage 5 End Stage CKD (GFR <15 mL/min/1.73 square meters)  Note: GFR calculation is accurate only with a steady state creatinine    Urine Microscopic [[id number]]  (Abnormal) Collected: 02/07/24 1621    Lab Status: Final result Specimen: Urine, Clean Catch Updated: 02/07/24 1805     RBC, UA Innumerable /hpf      WBC, UA Innumerable /hpf      Epithelial Cells Occasional /hpf      Bacteria, UA None Seen /hpf     UA w Reflex to Microscopic w Reflex to Culture [[id number]]  (Abnormal) Collected: 02/07/24 1621    Lab Status: Final result Specimen: Urine, Clean Catch Updated: 02/07/24 1804     Color, UA Light Orange     Clarity, UA Turbid     Specific Gravity, UA 1.017     pH, UA 6.0     Leukocytes, UA Moderate     Nitrite, UA Negative     Protein, UA 70 (1+) mg/dl      Glucose, UA Negative mg/dl      Ketones, UA Negative mg/dl      Urobilinogen, UA <2.0 mg/dl      Bilirubin, UA Negative     Occult Blood, UA Large                   FL retrograde pyelogram   Final Result by Aldair Zuniga DO (02/09 0835)      Fluoroscopic guidance provided for bilateral retrograde pyelogram and right ureteral stent exchange/left ureteral stent placement. Please see procedure report for further details.            Workstation performed: [id number]         CT renal stone study abdomen pelvis without contrast   Final Result by Stacie Talley MD (02/07 2143)      3 mm distal left ureteral calculus causing moderate hydroureteronephrosis.      Right-sided  nephroureteral stent in place. There is moderate right hydronephrosis, similar to the prior study.      Nonobstructing bilateral renal calculi.      Slight interval increase in the size of several subcentimeter pulmonary nodules in the visualized lung bases. Metastasis cannot be excluded. Follow-up is recommended.      The study was marked in EPIC for immediate notification.         Workstation performed: [id number]         US kidney and bladder   Final Result by Cyndy Pittman MD (02/07 1932)      Mild right-sided hydronephrosis. Proximal end of the right ureteral stent is not clearly visualized with sonographic technique. Distal tip of right ureteral stent seen within the urinary bladder.      Moderate left-sided hydronephrosis.            Workstation performed: [id number]                    Procedures  Procedures         ED Course  ED Course as of 02/09/24 1309   Wed Feb 07, 2024   1833 WBC, UA(!): Innumerable   1833 RBC, UA(!): Innumerable   2231 Patient states feeling better after IV tylenol and pyridium.  3mm L distal ureteral stone w/ moderate hydronephrosis. R ureteral stent in place and looks to be functioning well.  Case discussed with urology PA on call, Kandace - to review case.   2246 D/w urology, recommending admitting for IV abx and stent placement if does not pass the stone.    D/w Mariola ARRIAZA, accepts under Dr Jimbo guerrier.                                             Medical Decision Making  Amount and/or Complexity of Data Reviewed  Labs: ordered. Decision-making details documented in ED Course.  Radiology: ordered.    Risk  Prescription drug management.  Decision regarding hospitalization.             Disposition  Final diagnoses:   UTI (urinary tract infection)   Ureteral stone with hydronephrosis     Time reflects when diagnosis was documented in both MDM as applicable and the Disposition within this note       Time User Action Codes Description Comment    2/7/2024 10:34 PM Kandace Lopez Add  [N13.1] Hydronephrosis due to ureteral stricture     2/7/2024 10:34 PM Kandace Lopez Remove [N13.1] Hydronephrosis due to ureteral stricture     2/7/2024 10:35 PM Kandace Lopez Add [N20.1] Ureteral calculi     2/7/2024 10:47 PM Dichter, Kirit Add [N39.0] UTI (urinary tract infection)     2/7/2024 10:47 PM Dichter, Kirit Add [N13.2] Ureteral stone with hydronephrosis     2/7/2024 10:47 PM Dichter, Kirit Modify [N20.1] Ureteral calculi     2/7/2024 10:47 PM Dichter, Kirit Modify [N13.2] Ureteral stone with hydronephrosis     2/8/2024 12:13 AM Mariola Najera Add [N30.01] Acute cystitis with hematuria     2/8/2024  1:31 PM Andres Oden Modify [N20.1] Ureteral calculi     2/8/2024  1:57 PM José Luis Stephens Modify [N20.1] Ureteral calculi     2/8/2024  1:57 PM José Luis Stephens Add [N20.1] Ureteral calculi with hydroureteronephrosis           ED Disposition       ED Disposition   Admit    Condition   Stable    Date/Time   Wed Feb 7, 2024 10:47 PM    Comment   Case was discussed with SOFIYA Garnett and the patient's admission status was agreed to be Admission Status: inpatient status to the service of Dr. Choi .               Follow-up Information       Follow up With Specialties Details Why Contact Info    José Luis Stephens MD Urology Follow up in 1 week(s)  [address] [facility]  [address]  [facility] [address] [phone]      Leena Anaya MD Internal Medicine, Geriatric Medicine Follow up in 1 week(s)  [address] [facility] [address] [phone]      Leena Anaya MD Internal Medicine, Geriatric Medicine   [address] [place] [id number]  [phone]              Current Discharge Medication List        START taking these medications    Details   !! acetaminophen (TYLENOL) 325 mg tablet Take 2 tablets (650 mg total) by mouth every 4 (four) hours as needed for mild pain    Associated Diagnoses: Ureteral calculi      docusate sodium (COLACE) 100 mg capsule Take 1 capsule (100 mg  total) by mouth 2 (two) times a day for 15 days  Qty: 30 capsule, Refills: 0    Associated Diagnoses: Ureteral calculi      tamsulosin (FLOMAX) 0.4 mg Take 1 capsule (0.4 mg total) by mouth daily with dinner  Qty: 15 capsule, Refills: 0    Associated Diagnoses: Ureteral calculi       !! - Potential duplicate medications found. Please discuss with provider.        CONTINUE these medications which have CHANGED    Details   oxybutynin (DITROPAN) 5 mg tablet Take 1 tablet (5 mg total) by mouth every 6 (six) hours as needed (bladder spasms)  Qty: 30 tablet, Refills: 0    Associated Diagnoses: Ureteral calculi      phenazopyridine (PYRIDIUM) 200 mg tablet Take 1 tablet (200 mg total) by mouth 3 (three) times a day as needed (Pain with urination) for up to 3 days  Qty: 10 tablet, Refills: 0    Associated Diagnoses: Ureteral calculi           CONTINUE these medications which have NOT CHANGED    Details   !! acetaminophen (TYLENOL) 500 mg tablet Take 2 tablets (1,000 mg total) by mouth 3 (three) times a day  Qty: 180 tablet, Refills: 11    Comments: Please direct patient to over-the-counter product if not covered by insurer.  Associated Diagnoses: Malignant neoplasm of sigmoid colon (HCC); Cancer related pain; Scalp pain; Palliative care patient      amLODIPine (NORVASC) 10 mg tablet Take 1 tablet (10 mg total) by mouth daily  Qty: 90 tablet, Refills: 3    Associated Diagnoses: Primary hypertension      cyanocobalamin (VITAMIN B-12) 1000 MCG tablet Take 1,000 mcg by mouth daily      fenofibrate (TRICOR) 145 mg tablet Take 1 tablet (145 mg total) by mouth daily  Qty: 90 tablet, Refills: 3    Associated Diagnoses: Hepatic granuloma associated with sarcoidosis; Sarcoidosis; Elevated liver enzymes      potassium chloride (MICRO-K) 10 MEQ CR capsule Take 4 capsules (40 mEq total) by mouth 2 (two) times a day  Qty: 240 capsule, Refills: 0    Associated Diagnoses: Hypokalemia      ursodiol (ACTIGALL) 300 mg capsule Take 3 capsules  (900 mg total) by mouth in the morning  Qty: 90 capsule, Refills: 11    Associated Diagnoses: Hepatic granuloma associated with sarcoidosis       !! - Potential duplicate medications found. Please discuss with provider.          No discharge procedures on file.    PDMP Review         Value Time User    PDMP Reviewed  Yes 11/13/2023  9:19 AM Koko Pillai MD            ED Provider  Electronically Signed by             Kirit Gandhi DO  02/09/24 1755

## 2024-02-08 ENCOUNTER — ANESTHESIA EVENT (INPATIENT)
Dept: PERIOP | Facility: HOSPITAL | Age: 59
DRG: 446 | End: 2024-02-08
Payer: COMMERCIAL

## 2024-02-08 ENCOUNTER — ANESTHESIA (INPATIENT)
Dept: PERIOP | Facility: HOSPITAL | Age: 59
DRG: 446 | End: 2024-02-08
Payer: COMMERCIAL

## 2024-02-08 ENCOUNTER — APPOINTMENT (INPATIENT)
Dept: RADIOLOGY | Facility: HOSPITAL | Age: 59
DRG: 446 | End: 2024-02-08
Payer: COMMERCIAL

## 2024-02-08 ENCOUNTER — TELEPHONE (OUTPATIENT)
Dept: UROLOGY | Facility: CLINIC | Age: 59
End: 2024-02-08

## 2024-02-08 DIAGNOSIS — Z96.0 RETAINED URETERAL STENT: Primary | ICD-10-CM

## 2024-02-08 LAB
ANION GAP SERPL CALCULATED.3IONS-SCNC: 7 MMOL/L
BACTERIA UR CULT: NORMAL
BASOPHILS # BLD AUTO: 0.02 THOUSANDS/ÂΜL (ref 0–0.1)
BASOPHILS NFR BLD AUTO: 1 % (ref 0–1)
BUN SERPL-MCNC: 29 MG/DL (ref 5–25)
CALCIUM SERPL-MCNC: 9.1 MG/DL (ref 8.4–10.2)
CHLORIDE SERPL-SCNC: 111 MMOL/L (ref 96–108)
CO2 SERPL-SCNC: 23 MMOL/L (ref 21–32)
CREAT SERPL-MCNC: 1.1 MG/DL (ref 0.6–1.3)
EOSINOPHIL # BLD AUTO: 0.06 THOUSAND/ÂΜL (ref 0–0.61)
EOSINOPHIL NFR BLD AUTO: 1 % (ref 0–6)
ERYTHROCYTE [DISTWIDTH] IN BLOOD BY AUTOMATED COUNT: 16.2 % (ref 11.6–15.1)
GFR SERPL CREATININE-BSD FRML MDRD: 56 ML/MIN/1.73SQ M
GLUCOSE SERPL-MCNC: 86 MG/DL (ref 65–140)
HCT VFR BLD AUTO: 31 % (ref 34.8–46.1)
HGB BLD-MCNC: 9.6 G/DL (ref 11.5–15.4)
IMM GRANULOCYTES # BLD AUTO: 0.02 THOUSAND/UL (ref 0–0.2)
IMM GRANULOCYTES NFR BLD AUTO: 1 % (ref 0–2)
LYMPHOCYTES # BLD AUTO: 0.36 THOUSANDS/ÂΜL (ref 0.6–4.47)
LYMPHOCYTES NFR BLD AUTO: 9 % (ref 14–44)
MCH RBC QN AUTO: 25.5 PG (ref 26.8–34.3)
MCHC RBC AUTO-ENTMCNC: 31 G/DL (ref 31.4–37.4)
MCV RBC AUTO: 82 FL (ref 82–98)
MONOCYTES # BLD AUTO: 0.26 THOUSAND/ÂΜL (ref 0.17–1.22)
MONOCYTES NFR BLD AUTO: 6 % (ref 4–12)
NEUTROPHILS # BLD AUTO: 3.54 THOUSANDS/ÂΜL (ref 1.85–7.62)
NEUTS SEG NFR BLD AUTO: 82 % (ref 43–75)
NRBC BLD AUTO-RTO: 0 /100 WBCS
PLATELET # BLD AUTO: 165 THOUSANDS/UL (ref 149–390)
PMV BLD AUTO: 9.6 FL (ref 8.9–12.7)
POTASSIUM SERPL-SCNC: 3.7 MMOL/L (ref 3.5–5.3)
PROCALCITONIN SERPL-MCNC: 0.07 NG/ML
PROCALCITONIN SERPL-MCNC: 0.08 NG/ML
RBC # BLD AUTO: 3.77 MILLION/UL (ref 3.81–5.12)
SODIUM SERPL-SCNC: 141 MMOL/L (ref 135–147)
WBC # BLD AUTO: 4.26 THOUSAND/UL (ref 4.31–10.16)

## 2024-02-08 PROCEDURE — 99223 1ST HOSP IP/OBS HIGH 75: CPT | Performed by: INTERNAL MEDICINE

## 2024-02-08 PROCEDURE — 82360 CALCULUS ASSAY QUANT: CPT | Performed by: UROLOGY

## 2024-02-08 PROCEDURE — 0T778DZ DILATION OF LEFT URETER WITH INTRALUMINAL DEVICE, VIA NATURAL OR ARTIFICIAL OPENING ENDOSCOPIC: ICD-10-PCS | Performed by: UROLOGY

## 2024-02-08 PROCEDURE — 52332 CYSTOSCOPY AND TREATMENT: CPT | Performed by: UROLOGY

## 2024-02-08 PROCEDURE — 85025 COMPLETE CBC W/AUTO DIFF WBC: CPT | Performed by: NURSE PRACTITIONER

## 2024-02-08 PROCEDURE — C2617 STENT, NON-COR, TEM W/O DEL: HCPCS | Performed by: UROLOGY

## 2024-02-08 PROCEDURE — 80048 BASIC METABOLIC PNL TOTAL CA: CPT | Performed by: NURSE PRACTITIONER

## 2024-02-08 PROCEDURE — 74420 UROGRAPHY RTRGR +-KUB: CPT

## 2024-02-08 PROCEDURE — C1769 GUIDE WIRE: HCPCS | Performed by: UROLOGY

## 2024-02-08 PROCEDURE — 0T768DZ DILATION OF RIGHT URETER WITH INTRALUMINAL DEVICE, VIA NATURAL OR ARTIFICIAL OPENING ENDOSCOPIC: ICD-10-PCS | Performed by: UROLOGY

## 2024-02-08 PROCEDURE — 88300 SURGICAL PATH GROSS: CPT | Performed by: SPECIALIST

## 2024-02-08 PROCEDURE — 52352 CYSTOURETERO W/STONE REMOVE: CPT | Performed by: UROLOGY

## 2024-02-08 PROCEDURE — 0TC78ZZ EXTIRPATION OF MATTER FROM LEFT URETER, VIA NATURAL OR ARTIFICIAL OPENING ENDOSCOPIC: ICD-10-PCS | Performed by: UROLOGY

## 2024-02-08 PROCEDURE — BT1F1ZZ FLUOROSCOPY OF LEFT KIDNEY, URETER AND BLADDER USING LOW OSMOLAR CONTRAST: ICD-10-PCS | Performed by: UROLOGY

## 2024-02-08 PROCEDURE — 0TP98DZ REMOVAL OF INTRALUMINAL DEVICE FROM URETER, VIA NATURAL OR ARTIFICIAL OPENING ENDOSCOPIC: ICD-10-PCS | Performed by: UROLOGY

## 2024-02-08 PROCEDURE — C1758 CATHETER, URETERAL: HCPCS | Performed by: UROLOGY

## 2024-02-08 PROCEDURE — 84145 PROCALCITONIN (PCT): CPT | Performed by: NURSE PRACTITIONER

## 2024-02-08 PROCEDURE — 99254 IP/OBS CNSLTJ NEW/EST MOD 60: CPT | Performed by: UROLOGY

## 2024-02-08 DEVICE — INLAY OPTIMA URETERAL STENT W/NITINOL GUIDEWIRE
Type: IMPLANTABLE DEVICE | Site: URETER | Status: FUNCTIONAL
Brand: BARD® INLAY OPTIMA® URETERAL STENT WITH NICORE™ GUIDEWIRE

## 2024-02-08 RX ORDER — OXYCODONE HYDROCHLORIDE 5 MG/1
5 TABLET ORAL EVERY 6 HOURS PRN
Status: DISCONTINUED | OUTPATIENT
Start: 2024-02-08 | End: 2024-02-08

## 2024-02-08 RX ORDER — URSODIOL 300 MG/1
900 CAPSULE ORAL DAILY
Status: DISCONTINUED | OUTPATIENT
Start: 2024-02-08 | End: 2024-02-09 | Stop reason: HOSPADM

## 2024-02-08 RX ORDER — ONDANSETRON 2 MG/ML
4 INJECTION INTRAMUSCULAR; INTRAVENOUS ONCE AS NEEDED
Status: DISCONTINUED | OUTPATIENT
Start: 2024-02-08 | End: 2024-02-08 | Stop reason: HOSPADM

## 2024-02-08 RX ORDER — FENOFIBRATE 145 MG/1
145 TABLET, COATED ORAL DAILY
Status: DISCONTINUED | OUTPATIENT
Start: 2024-02-08 | End: 2024-02-09 | Stop reason: HOSPADM

## 2024-02-08 RX ORDER — CEFAZOLIN SODIUM 1 G/50ML
1000 SOLUTION INTRAVENOUS
Status: COMPLETED | OUTPATIENT
Start: 2024-02-08 | End: 2024-02-08

## 2024-02-08 RX ORDER — PHENAZOPYRIDINE HYDROCHLORIDE 200 MG/1
200 TABLET, FILM COATED ORAL 3 TIMES DAILY PRN
Qty: 10 TABLET | Refills: 0 | Status: SHIPPED | OUTPATIENT
Start: 2024-02-08 | End: 2024-02-11

## 2024-02-08 RX ORDER — PROPOFOL 10 MG/ML
INJECTION, EMULSION INTRAVENOUS AS NEEDED
Status: DISCONTINUED | OUTPATIENT
Start: 2024-02-08 | End: 2024-02-08

## 2024-02-08 RX ORDER — DEXAMETHASONE SODIUM PHOSPHATE 10 MG/ML
INJECTION, SOLUTION INTRAMUSCULAR; INTRAVENOUS AS NEEDED
Status: DISCONTINUED | OUTPATIENT
Start: 2024-02-08 | End: 2024-02-08

## 2024-02-08 RX ORDER — ACETAMINOPHEN 325 MG/1
650 TABLET ORAL EVERY 4 HOURS PRN
Start: 2024-02-08

## 2024-02-08 RX ORDER — HYDROMORPHONE HYDROCHLORIDE 2 MG/1
2 TABLET ORAL EVERY 4 HOURS PRN
Status: DISCONTINUED | OUTPATIENT
Start: 2024-02-08 | End: 2024-02-09 | Stop reason: HOSPADM

## 2024-02-08 RX ORDER — FENTANYL CITRATE/PF 50 MCG/ML
25 SYRINGE (ML) INJECTION
Status: DISCONTINUED | OUTPATIENT
Start: 2024-02-08 | End: 2024-02-08 | Stop reason: HOSPADM

## 2024-02-08 RX ORDER — OXYBUTYNIN CHLORIDE 5 MG/1
5 TABLET ORAL 3 TIMES DAILY PRN
Status: DISCONTINUED | OUTPATIENT
Start: 2024-02-08 | End: 2024-02-09 | Stop reason: HOSPADM

## 2024-02-08 RX ORDER — FENTANYL CITRATE 50 UG/ML
INJECTION, SOLUTION INTRAMUSCULAR; INTRAVENOUS AS NEEDED
Status: DISCONTINUED | OUTPATIENT
Start: 2024-02-08 | End: 2024-02-08

## 2024-02-08 RX ORDER — DOCUSATE SODIUM 100 MG/1
100 CAPSULE, LIQUID FILLED ORAL 2 TIMES DAILY
Qty: 30 CAPSULE | Refills: 0 | Status: SHIPPED | OUTPATIENT
Start: 2024-02-08 | End: 2024-02-23

## 2024-02-08 RX ORDER — SODIUM CHLORIDE 9 MG/ML
100 INJECTION, SOLUTION INTRAVENOUS CONTINUOUS
Status: DISCONTINUED | OUTPATIENT
Start: 2024-02-08 | End: 2024-02-08

## 2024-02-08 RX ORDER — MIDAZOLAM HYDROCHLORIDE 2 MG/2ML
INJECTION, SOLUTION INTRAMUSCULAR; INTRAVENOUS AS NEEDED
Status: DISCONTINUED | OUTPATIENT
Start: 2024-02-08 | End: 2024-02-08

## 2024-02-08 RX ORDER — SODIUM CHLORIDE, SODIUM LACTATE, POTASSIUM CHLORIDE, CALCIUM CHLORIDE 600; 310; 30; 20 MG/100ML; MG/100ML; MG/100ML; MG/100ML
125 INJECTION, SOLUTION INTRAVENOUS CONTINUOUS
Status: DISCONTINUED | OUTPATIENT
Start: 2024-02-08 | End: 2024-02-09 | Stop reason: HOSPADM

## 2024-02-08 RX ORDER — AMLODIPINE BESYLATE 10 MG/1
10 TABLET ORAL DAILY
Status: DISCONTINUED | OUTPATIENT
Start: 2024-02-08 | End: 2024-02-09 | Stop reason: HOSPADM

## 2024-02-08 RX ORDER — LIDOCAINE HYDROCHLORIDE 10 MG/ML
INJECTION, SOLUTION EPIDURAL; INFILTRATION; INTRACAUDAL; PERINEURAL AS NEEDED
Status: DISCONTINUED | OUTPATIENT
Start: 2024-02-08 | End: 2024-02-08

## 2024-02-08 RX ORDER — OXYBUTYNIN CHLORIDE 5 MG/1
5 TABLET ORAL EVERY 6 HOURS PRN
Qty: 30 TABLET | Refills: 0 | Status: SHIPPED | OUTPATIENT
Start: 2024-02-08

## 2024-02-08 RX ORDER — ACETAMINOPHEN 325 MG/1
650 TABLET ORAL EVERY 6 HOURS PRN
Status: DISCONTINUED | OUTPATIENT
Start: 2024-02-08 | End: 2024-02-09 | Stop reason: HOSPADM

## 2024-02-08 RX ORDER — ONDANSETRON 2 MG/ML
INJECTION INTRAMUSCULAR; INTRAVENOUS AS NEEDED
Status: DISCONTINUED | OUTPATIENT
Start: 2024-02-08 | End: 2024-02-08

## 2024-02-08 RX ORDER — TAMSULOSIN HYDROCHLORIDE 0.4 MG/1
0.4 CAPSULE ORAL
Qty: 15 CAPSULE | Refills: 0 | Status: SHIPPED | OUTPATIENT
Start: 2024-02-08

## 2024-02-08 RX ORDER — SODIUM CHLORIDE, SODIUM LACTATE, POTASSIUM CHLORIDE, CALCIUM CHLORIDE 600; 310; 30; 20 MG/100ML; MG/100ML; MG/100ML; MG/100ML
20 INJECTION, SOLUTION INTRAVENOUS CONTINUOUS
Status: DISCONTINUED | OUTPATIENT
Start: 2024-02-08 | End: 2024-02-08

## 2024-02-08 RX ADMIN — PROPOFOL 15 MG: 10 INJECTION, EMULSION INTRAVENOUS at 13:05

## 2024-02-08 RX ADMIN — FENTANYL CITRATE 25 MCG: 50 INJECTION INTRAMUSCULAR; INTRAVENOUS at 12:40

## 2024-02-08 RX ADMIN — MIDAZOLAM 1 MG: 1 INJECTION INTRAMUSCULAR; INTRAVENOUS at 12:25

## 2024-02-08 RX ADMIN — MIDAZOLAM 1 MG: 1 INJECTION INTRAMUSCULAR; INTRAVENOUS at 12:22

## 2024-02-08 RX ADMIN — FENTANYL CITRATE 25 MCG: 50 INJECTION INTRAMUSCULAR; INTRAVENOUS at 13:03

## 2024-02-08 RX ADMIN — AMLODIPINE BESYLATE 10 MG: 10 TABLET ORAL at 08:38

## 2024-02-08 RX ADMIN — CYANOCOBALAMIN TAB 500 MCG 1000 MCG: 500 TAB at 08:39

## 2024-02-08 RX ADMIN — FENTANYL CITRATE 50 MCG: 50 INJECTION INTRAMUSCULAR; INTRAVENOUS at 12:41

## 2024-02-08 RX ADMIN — HYDROMORPHONE HYDROCHLORIDE 2 MG: 2 TABLET ORAL at 19:34

## 2024-02-08 RX ADMIN — Medication 125 MG: at 21:17

## 2024-02-08 RX ADMIN — OXYCODONE HYDROCHLORIDE 5 MG: 5 TABLET ORAL at 04:07

## 2024-02-08 RX ADMIN — FENTANYL CITRATE 25 MCG: 50 INJECTION INTRAMUSCULAR; INTRAVENOUS at 13:37

## 2024-02-08 RX ADMIN — TAMSULOSIN HYDROCHLORIDE 0.4 MG: 0.4 CAPSULE ORAL at 15:55

## 2024-02-08 RX ADMIN — FENTANYL CITRATE 25 MCG: 50 INJECTION INTRAMUSCULAR; INTRAVENOUS at 13:07

## 2024-02-08 RX ADMIN — OXYBUTYNIN CHLORIDE 5 MG: 5 TABLET ORAL at 19:37

## 2024-02-08 RX ADMIN — FENOFIBRATE 145 MG: 145 TABLET ORAL at 08:38

## 2024-02-08 RX ADMIN — LIDOCAINE HYDROCHLORIDE 50 MG: 10 INJECTION, SOLUTION EPIDURAL; INFILTRATION; INTRACAUDAL; PERINEURAL at 12:28

## 2024-02-08 RX ADMIN — OXYCODONE HYDROCHLORIDE 5 MG: 5 TABLET ORAL at 15:25

## 2024-02-08 RX ADMIN — URSODIOL 900 MG: 300 CAPSULE ORAL at 08:45

## 2024-02-08 RX ADMIN — CEFAZOLIN SODIUM 1000 MG: 1 SOLUTION INTRAVENOUS at 12:28

## 2024-02-08 RX ADMIN — ACETAMINOPHEN 650 MG: 325 TABLET, FILM COATED ORAL at 08:52

## 2024-02-08 RX ADMIN — FENTANYL CITRATE 25 MCG: 50 INJECTION INTRAMUSCULAR; INTRAVENOUS at 13:45

## 2024-02-08 RX ADMIN — PROPOFOL 200 MG: 10 INJECTION, EMULSION INTRAVENOUS at 12:28

## 2024-02-08 RX ADMIN — Medication 125 MG: at 08:47

## 2024-02-08 RX ADMIN — FENTANYL CITRATE 25 MCG: 50 INJECTION INTRAMUSCULAR; INTRAVENOUS at 12:57

## 2024-02-08 RX ADMIN — SODIUM CHLORIDE 100 ML/HR: 0.9 INJECTION, SOLUTION INTRAVENOUS at 00:34

## 2024-02-08 RX ADMIN — SODIUM CHLORIDE, SODIUM LACTATE, POTASSIUM CHLORIDE, AND CALCIUM CHLORIDE 125 ML/HR: .6; .31; .03; .02 INJECTION, SOLUTION INTRAVENOUS at 14:25

## 2024-02-08 RX ADMIN — DEXAMETHASONE SODIUM PHOSPHATE 8 MG: 10 INJECTION INTRAMUSCULAR; INTRAVENOUS at 12:41

## 2024-02-08 RX ADMIN — ONDANSETRON 4 MG: 2 INJECTION INTRAMUSCULAR; INTRAVENOUS at 13:05

## 2024-02-08 NOTE — ASSESSMENT & PLAN NOTE
"Presentation: Patient presents with complaints of dysuria, difficulty urinating and hematuria for the past week or more. Patient has a history of kidney stones and currently has a right ureteral stent in place. She reports that she also had a left ureteral stent in place but was since removed 2 weeks ago. She denies flank pain, fever, chills, nausea or vomiting.   CT: \"3 mm distal left ureteral calculus causing moderate hydroureteronephrosis. Right-sided nephroureteral stent in place. There is moderate right hydronephrosis, similar to the prior study. Nonobstructing bilateral renal calculi.\"  UA: Moderate leuks, protein 70 (1+), large occult blood.  IV Hydration.  Pain control.  Flomax.  Ditropan prn.  Strain all urine.  NPO -- if unable to pass left ureteral stone may require placement of stent.  Consult Urology.  "

## 2024-02-08 NOTE — ASSESSMENT & PLAN NOTE
Patient did not meet two SIRS criteria on admission.  UA: Moderate leuks, protein 70 (1+), large occult blood.  Urine microscopic: Innumerable RBCs, innumerable WBCs, occasional epithelial cells  Urine culture pending, prior urine culture without any growth.  Continue IV Rocephin. PO Vanco PPx due to prior C.diff infection.

## 2024-02-08 NOTE — UTILIZATION REVIEW
"Initial Clinical Review    Admission: Date/Time/Statement:   Admission Orders (From admission, onward)       Ordered        02/07/24 2249  INPATIENT ADMISSION  Once                          Orders Placed This Encounter   Procedures    INPATIENT ADMISSION     Standing Status:   Standing     Number of Occurrences:   1     Order Specific Question:   Level of Care     Answer:   Med Surg [16]     Order Specific Question:   Estimated length of stay     Answer:   More than 2 Midnights     Order Specific Question:   Certification     Answer:   I certify that inpatient services are medically necessary for this patient for a duration of greater than two midnights. See H&P and MD Progress Notes for additional information about the patient's course of treatment.     ED Arrival Information       Expected   -    Arrival   2/7/2024 15:29    Acuity   Urgent              Means of arrival   Walk-In    Escorted by   Family Member    Service   Hospitalist    Admission type   Emergency              Arrival complaint   pain and burning during urination             Chief Complaint   Patient presents with    Possible UTI     Pt reports difficulty urinating, burning, blood in urine, \"genital pain\". Reports she thinks she has problem with kidney. Denies abd pain.       Initial Presentation: 58 y.o. female  PMH of kidney calculi, currently has a right ureteral stent in place, L stent removed 2 weeks ago ,  HTN, rectal cancer, s/p colostomy, sarcoidosis , hx c diff, and seizures who presents to ED from home with omplaints of dysuria, difficulty urinating , urgency, and hematuria for the past week or more. On exam, abdomen soft, non tender.   Labs- UA innumerable WBC, RBC, mod leuks,  protein 70 (1+), large occult blood.  CT A/P shows 3 mm distal left ureteral calculus causing moderate hydroureteronephrosis. Right-sided nephroureteral stent in place,  moderate right hydronephrosis . Pt given IVF, Iv abx, Flomax in ED. Admitted as Inpatient with " urteral calculi w/ hydroureteronephrosis, acute cystitis w/ hematuria. Plan - NPO. IVF. Flomax. Pain control Prn Ditropan . Strain all urine . Urology consult. IV Rocephin. Po Vanco for c diff ppx       Date: 2/8   Day 2:    Urology consult- Hx right ureteral stricture managed with chronic right ureteral stent.  Patient S/p cystoscopy, left ureteroscopy, holmium laser lithotripsy on 12/15/2023  for obstructing left ureteral calculus.  Had left ureteral stent removal on 1/16/2024. 2 days ago when she started to have abdominal pain, hematuria, dysuria . CT scan was done showing a 3 mm distal left ureteral calculus associated with moderate hydroureteronephrosis .   Reports punch colored urine , dysuria, lower abdominal pain, worse with urination    WBC down sherry 4.26 today. Hgb down to 9.6 from 11.2 . Creat 1.1, baseline . Plan- OR today.   2/8/24 @ 1242    Cystoscopy   Left retrograde pyelography with fluoroscopic interpretation   Left ureteroscopy with laser lithotripsy and basket extraction of stone; and dilation of left ureteral stricture   Left ureteral stent placement  right ureteral stent exchange  General anesthesia  FINDINGS:  -Left retrograde pyelogram as well as diagnostic ureteroscopy demonstrated recurrent left ureteral stricture disease.  There is a distal ureteral stricture extending to the level of the iliac vessels.  This is felt to be consistent with the patient's prior history of radiation for colorectal cancer  - The small stone in the ureter was associated with the stricture.  I was able to extract this using gentle basket extraction after performing a dilation of the ureteral stricture  - Due to the severity of the stricture, I was unable to access the upper urinary tract in order to address her left intrarenal stones  - A postoperative left ureteral stent was left in place without an external string  - The patient's chronic right ureteral stent was also exchanged today.   Medicine- continue IV abx  pending cx results .      ED Triage Vitals   Temperature Pulse Respirations Blood Pressure SpO2   02/07/24 1611 02/07/24 1611 02/07/24 1611 02/07/24 1611 02/07/24 1611   97.8 °F (36.6 °C) 83 18 162/86 98 %      Temp Source Heart Rate Source Patient Position - Orthostatic VS BP Location FiO2 (%)   02/07/24 1611 02/07/24 1611 02/07/24 1611 02/07/24 1611 --   Oral Monitor Lying Right arm       Pain Score       02/07/24 1920       No Pain          Wt Readings from Last 1 Encounters:   02/08/24 66.2 kg (146 lb)     Additional Vital Signs:   Date/Time Temp Pulse Resp BP MAP (mmHg) SpO2   02/08/24 1051 97.1 °F (36.2 °C) Abnormal  75 18 116/56 -- 98 %   02/08/24 0952 -- -- -- -- -- --   02/08/24 0900 -- -- -- -- -- --   02/08/24 08:41:35 97.9 °F (36.6 °C) 85 18 127/71 90 93 %   02/08/24 04:05:16 -- 82 -- -- -- 92 %   02/08/24 0016 98.2 °F (36.8 °C) 85 18 160/72 -- 97 %   02/07/24 1920 -- 81 20 144/82 -- --     Pertinent Labs/Diagnostic Test Results:   CT renal stone study abdomen pelvis without contrast   Final Result by Stacie Talley MD (02/07 2143)      3 mm distal left ureteral calculus causing moderate hydroureteronephrosis.      Right-sided nephroureteral stent in place. There is moderate right hydronephrosis, similar to the prior study.      Nonobstructing bilateral renal calculi.      Slight interval increase in the size of several subcentimeter pulmonary nodules in the visualized lung bases. Metastasis cannot be excluded. Follow-up is recommended.      The study was marked in EPIC for immediate notification.         Workstation performed: [id number]         US kidney and bladder   Final Result by Cyndy Pittman MD (02/07 1932)      Mild right-sided hydronephrosis. Proximal end of the right ureteral stent is not clearly visualized with sonographic technique. Distal tip of right ureteral stent seen within the urinary bladder.      Moderate left-sided hydronephrosis.            Workstation performed: [id number]     "     FL retrograde pyelogram    (Results Pending)         Results from last 7 days   Lab Units 02/08/24  0545 02/07/24  2047   WBC Thousand/uL 4.26* 7.04   HEMOGLOBIN g/dL 9.6* 11.2*   HEMATOCRIT % 31.0* 35.6   PLATELETS Thousands/uL 165 193   NEUTROS ABS Thousands/µL 3.54  --    BANDS PCT %  --  2         Results from last 7 days   Lab Units 02/08/24  0545 02/07/24  2047   SODIUM mmol/L 141 138   POTASSIUM mmol/L 3.7 4.9   CHLORIDE mmol/L 111* 107   CO2 mmol/L 23 22   ANION GAP mmol/L 7 9   BUN mg/dL 29* 30*   CREATININE mg/dL 1.10 1.17   EGFR ml/min/1.73sq m 56 52   CALCIUM mg/dL 9.1 10.1             Results from last 7 days   Lab Units 02/08/24  0545 02/07/24  2047   GLUCOSE RANDOM mg/dL 86 133             No results found for: \"BETA-HYDROXYBUTYRATE\"                                   Results from last 7 days   Lab Units 02/08/24  0545 02/07/24  2047   PROCALCITONIN ng/ml 0.07 0.08                                                     Results from last 7 days   Lab Units 02/07/24  1621   CLARITY UA  Turbid   COLOR UA  Light Orange   SPEC GRAV UA  1.017   PH UA  6.0   GLUCOSE UA mg/dl Negative   KETONES UA mg/dl Negative   BLOOD UA  Large*   PROTEIN UA mg/dl 70 (1+)*   NITRITE UA  Negative   BILIRUBIN UA  Negative   UROBILINOGEN UA (BE) mg/dl <2.0   LEUKOCYTES UA  Moderate*   WBC UA /hpf Innumerable*   RBC UA /hpf Innumerable*   BACTERIA UA /hpf None Seen   EPITHELIAL CELLS WET PREP /hpf Occasional             ED Treatment:   Medication Administration from 02/07/2024 1529 to 02/07/2024 2351         Date/Time Order Dose Route Action     02/07/2024 1850 EST cefuroxime (CEFTIN) tablet 500 mg 500 mg Oral Given     02/07/2024 2126 EST phenazopyridine (PYRIDIUM) tablet 100 mg 100 mg Oral Given     02/07/2024 2328 EST ketorolac (TORADOL) injection 15 mg -- Intravenous Canceled Entry     02/07/2024 2233 EST sodium chloride 0.9 % bolus 1,000 mL 0 mL Intravenous Stopped     02/07/2024 2127 EST sodium chloride 0.9 % bolus 1,000 " mL 1,000 mL Intravenous New Bag     02/07/2024 2220 EST acetaminophen (Ofirmev) injection 1,000 mg 0 mg Intravenous Stopped     02/07/2024 2126 EST acetaminophen (Ofirmev) injection 1,000 mg 1,000 mg Intravenous New Bag     02/07/2024 2245 EST ceftriaxone (ROCEPHIN) 1 g/50 mL in dextrose IVPB 1,000 mg Intravenous New Bag     02/07/2024 2245 EST tamsulosin (FLOMAX) capsule 0.4 mg 0.4 mg Oral Given          Past Medical History:   Diagnosis Date    Cancer (HCC)     Colon cancer (HCC)     Fall     Headache     Hemochromatosis, unspecified     History of transfusion     2022 - no adverse reaction    Hypertension     Kidney calculi     Kidney stone     Liver disease     hemangioma    Muscle weakness     Personal history of COVID-19 12/2022    Sarcoidosis     Seizures (HCC)     2022    Shingles      Present on Admission:   Ureteral calculi with hydroureteronephrosis   Hypertension   Sarcoidosis   Acute cystitis with hematuria   Rectal cancer (HCC)   Pulmonary nodules      Admitting Diagnosis: Ureteral calculi [N20.1]  UTI (urinary tract infection) [N39.0]  Ureteral stone with hydronephrosis [N13.2]  UTI symptoms [R39.9]  Age/Sex: 58 y.o. female  Admission Orders:  Scheduled Medications:   amLODIPine, 10 mg, Oral, Daily  cefTRIAXone, 1,000 mg, Intravenous, Q24H   cyanocobalamin, 1,000 mcg, Oral, Daily  fenofibrate, 145 mg, Oral, Daily   tamsulosin, 0.4 mg, Oral, Daily With Dinner  ursodiol, 900 mg, Oral, Daily  vancomycin, 125 mg, Oral, Q12H STANLEY    ceFAZolin (ANCEF) IVPB (premix in dextrose) 1,000 mg 50 mL  Dose: 1,000 mg  Freq: On call to O.R. Route: IV  Start: 02/08/24 1215 End: 02/08/24 1228   Continuous IV Infusions:  sodium chloride, 100 mL/hr, Intravenous, Continuous      PRN Meds:  acetaminophen, 650 mg, Oral, Q6H PRN x1 2/8   oxybutynin, 5 mg, Oral, TID PRN   oxyCODONE, 5 mg, Oral, Q6H PRN x1 2/8    oxyCODONE, 2.5 mg, Oral, Q6H PRN  HYDROmorphone (DILAUDID) injection 0.5 mg  Dose: 0.5 mg  Freq: Once as needed  Route: IV  x1 2/7 @ 5739        NPO    Strain all urine    OOB as noemí SCD          IP CONSULT TO UROLOGY    Network Utilization Review Department  ATTENTION: Please call with any questions or concerns to [phone] and carefully listen to the prompts so that you are directed to the right person. All voicemails are confidential.   For Discharge needs, contact Care Management DC Support Team at [phone] opt. 2  Send all requests for admission clinical reviews, approved or denied determinations and any other requests to dedicated fax number below belonging to the campus where the patient is receiving treatment. List of dedicated fax numbers for the Facilities:  FACILITY NAME UR FAX NUMBER   ADMISSION DENIALS (Administrative/Medical Necessity) [phone]   DISCHARGE SUPPORT TEAM (NETWORK) [phone]   PARENT CHILD HEALTH (Maternity/NICU/Pediatrics) [phone]   [facility] [phone]   [facility] [phone]   [facility] [phone]   [facility] [phone]   Critical access hospital [phone]   [facility] [phone]   [facility] [phone]   [facility] [phone]   [facility] [phone]   [facility] [phone]   [facility] [phone]   [facility] [phone]

## 2024-02-08 NOTE — PLAN OF CARE
Problem: PAIN - ADULT  Goal: Verbalizes/displays adequate comfort level or baseline comfort level  Description: Interventions:  - Encourage patient to monitor pain and request assistance  - Assess pain using appropriate pain scale  - Administer analgesics based on type and severity of pain and evaluate response  - Implement non-pharmacological measures as appropriate and evaluate response  - Consider cultural and social influences on pain and pain management  - Notify physician/advanced practitioner if interventions unsuccessful or patient reports new pain  Outcome: Progressing     Problem: INFECTION - ADULT  Goal: Absence or prevention of progression during hospitalization  Description: INTERVENTIONS:  - Assess and monitor for signs and symptoms of infection  - Monitor lab/diagnostic results  - Monitor all insertion sites, i.e. indwelling lines, tubes, and drains  - Monitor endotracheal if appropriate and nasal secretions for changes in amount and color  - Ralls appropriate cooling/warming therapies per order  - Administer medications as ordered  - Instruct and encourage patient and family to use good hand hygiene technique  - Identify and instruct in appropriate isolation precautions for identified infection/condition  Outcome: Progressing  Goal: Absence of fever/infection during neutropenic period  Description: INTERVENTIONS:  - Monitor WBC    Outcome: Progressing     Problem: SAFETY ADULT  Goal: Patient will remain free of falls  Description: INTERVENTIONS:  - Educate patient/family on patient safety including physical limitations  - Instruct patient to call for assistance with activity   - Consult OT/PT to assist with strengthening/mobility   - Keep Call bell within reach  - Keep bed low and locked with side rails adjusted as appropriate  - Keep care items and personal belongings within reach  - Initiate and maintain comfort rounds  - Make Fall Risk Sign visible to staff  - Offer Toileting   - Apply  yellow socks and bracelet for high fall risk patients  - Consider moving patient to room near nurses station  Outcome: Progressing  Goal: Maintain or return to baseline ADL function  Description: INTERVENTIONS:  -  Assess patient's ability to carry out ADLs; assess patient's baseline for ADL function and identify physical deficits which impact ability to perform ADLs (bathing, care of mouth/teeth, toileting, grooming, dressing, etc.)  - Assess/evaluate cause of self-care deficits   - Assess range of motion  - Assess patient's mobility; develop plan if impaired  - Assess patient's need for assistive devices and provide as appropriate  - Encourage maximum independence but intervene and supervise when necessary  - Involve family in performance of ADLs  - Assess for home care needs following discharge   - Consider OT consult to assist with ADL evaluation and planning for discharge  - Provide patient education as appropriate  Outcome: Progressing  Goal: Maintains/Returns to pre admission functional level  Description: INTERVENTIONS:  - Perform AM-PAC 6 Click Basic Mobility/ Daily Activity assessment daily.  - Set and communicate daily mobility goal to care team and patient/family/caregiver.   - Collaborate with rehabilitation services on mobility goals if consulted  - Perform Range of Motion   - Reposition patient   - Dangle patient   - Stand patient   - Ambulate patient  - Out of bed to chair   - Out of bed for meals   - Record patient progress and toleration of activity level   Outcome: Progressing     Problem: DISCHARGE PLANNING  Goal: Discharge to home or other facility with appropriate resources  Description: INTERVENTIONS:  - Identify barriers to discharge w/patient and caregiver  - Arrange for needed discharge resources and transportation as appropriate  - Identify discharge learning needs (meds, wound care, etc.)  - Arrange for interpretive services to assist at discharge as needed  - Refer to Case Management  Department for coordinating discharge planning if the patient needs post-hospital services based on physician/advanced practitioner order or complex needs related to functional status, cognitive ability, or social support system  Outcome: Progressing     Problem: Knowledge Deficit  Goal: Patient/family/caregiver demonstrates understanding of disease process, treatment plan, medications, and discharge instructions  Description: Complete learning assessment and assess knowledge base.  Interventions:  - Provide teaching at level of understanding  - Provide teaching via preferred learning methods  Outcome: Progressing

## 2024-02-08 NOTE — ANESTHESIA PREPROCEDURE EVALUATION
Procedure:  CYSTOSCOPY RETROGRADE PYELOGRAM WITH INSERTION STENT URETERAL (Left: Bladder)    Relevant Problems   CARDIO   (+) Chest pain   (+) Hypertension   (+) Nonrheumatic mitral valve regurgitation      GI/HEPATIC   (+) Malignant neoplasm of sigmoid colon (HCC)   (+) Partial small bowel obstruction (HCC)   (+) Rectal cancer (HCC)      /RENAL   (+) Hydronephrosis due to ureteral stricture   (+) Kidney calculi      HEMATOLOGY   (+) Other specified anemias   (+) Thrombocytopenia (HCC)      NEURO/PSYCH   (+) Anxiety about health      Respiratory   (+) Pulmonary nodules      Genitourinary   (+) Ureteral calculi with hydroureteronephrosis      Other   (+) Port-A-Cath in place   (+) Sarcoidosis        Physical Exam    Airway    Mallampati score: III  TM Distance: >3 FB  Neck ROM: full     Dental       Cardiovascular      Pulmonary      Other Findings  post-pubertal.      Anesthesia Plan  ASA Score- 3     Anesthesia Type- general with ASA Monitors.         Additional Monitors:     Airway Plan: LMA.           Plan Factors-    Chart reviewed. EKG reviewed.  Existing labs reviewed. Patient summary reviewed.                  Induction- intravenous.    Postoperative Plan-     Informed Consent- Anesthetic plan and risks discussed with patient.  I personally reviewed this patient with the CRNA. Discussed and agreed on the Anesthesia Plan with the CRNA..

## 2024-02-08 NOTE — H&P
"Formerly [facility]  H&P  Name: Danielle Haque 58 y.o. female I MRN: [id number]  Unit/Bed#: W -01 I Date of Admission: 2/7/2024   Date of Service: 2/8/2024 I Hospital Day: 1      Assessment/Plan   * Ureteral calculi with hydroureteronephrosis  Assessment & Plan  Presentation: Patient presents with complaints of dysuria, difficulty urinating and hematuria for the past week or more. Patient has a history of kidney stones and currently has a right ureteral stent in place. She reports that she also had a left ureteral stent in place but was since removed 2 weeks ago. She denies flank pain, fever, chills, nausea or vomiting.   CT: \"3 mm distal left ureteral calculus causing moderate hydroureteronephrosis. Right-sided nephroureteral stent in place. There is moderate right hydronephrosis, similar to the prior study. Nonobstructing bilateral renal calculi.\"  UA: Moderate leuks, protein 70 (1+), large occult blood.  IV Hydration.  Pain control.  Flomax.  Ditropan prn.  Strain all urine.  NPO -- if unable to pass left ureteral stone may require placement of stent.  Consult Urology.    Acute cystitis with hematuria  Assessment & Plan  Patient did not meet two SIRS criteria on admission.  UA: Moderate leuks, protein 70 (1+), large occult blood.  Urine microscopic: Innumerable RBCs, innumerable WBCs, occasional epithelial cells  Urine culture pending, prior urine culture without any growth.  Continue IV Rocephin. PO Vanco PPx due to prior C.diff infection.    Rectal cancer (HCC)  Assessment & Plan  Colostomy in situ.  Ostomy care.  Follows with hematology-oncology as an outpatient.    Hypertension  Assessment & Plan  Acceptable BP.  Continue home medication of Amlodipine.    Sarcoidosis  Assessment & Plan  Follows with pulmonology as an outpatient.    Pulmonary nodules  Assessment & Plan  CT A/P: \"Slight interval increase in the size of several subcentimeter pulmonary nodules in the visualized lung bases. " "Metastasis cannot be excluded.\"  Follow up as an outpatient.         VTE Pharmacologic Prophylaxis: VTE Score: 5 High Risk (Score >/= 5) - Pharmacological DVT Prophylaxis Contraindicated. Sequential Compression Devices Ordered.  Code Status: Level 1 - Full Code prior  Discussion with family: Patient declined call to .     Anticipated Length of Stay: Patient will be admitted on an inpatient basis with an anticipated length of stay of greater than 2 midnights secondary to ureteral calculi with hydronephrosis, cystitis .    Total Time Spent on Date of Encounter in care of patient: 70 mins. This time was spent on one or more of the following: performing physical exam; counseling and coordination of care; obtaining or reviewing history; documenting in the medical record; reviewing/ordering tests, medications or procedures; communicating with other healthcare professionals and discussing with patient's family/caregivers.    Chief Complaint: Dysuria, hematuria, inability to urinate    History of Present Illness:  Danielle Haque is a 58 y.o. female with a PMH of kidney calculi, HTN, rectal cancer, s/p colostomy, sarcoidosis and seizures who presents with omplaints of dysuria, difficulty urinating and hematuria for the past week or more. Patient has a history of kidney stones and currently has a right ureteral stent in place. She reports that she also had a left ureteral stent in place but was since removed 2 weeks ago. She denies flank pain, fever, chills, nausea or vomiting.     Review of Systems:  Review of Systems   Constitutional:  Negative for chills and fever.   Respiratory:  Negative for shortness of breath.    Cardiovascular:  Negative for chest pain.   Gastrointestinal:  Negative for abdominal pain, nausea and vomiting.   Genitourinary:  Positive for difficulty urinating, dysuria, hematuria and urgency. Negative for flank pain.   All other systems reviewed and are negative.      Past Medical and " Surgical History:   Past Medical History:   Diagnosis Date    Cancer (HCC)     Colon cancer (HCC)     Fall     Headache     Hemochromatosis, unspecified     History of transfusion     2022 - no adverse reaction    Hypertension     Kidney calculi     Kidney stone     Liver disease     hemangioma    Muscle weakness     Personal history of COVID-19 12/2022    Sarcoidosis     Seizures (HCC)     2022    Shingles        Past Surgical History:   Procedure Laterality Date    ABSCESS DRAINAGE      left breast    BREAST BIOPSY      CHEST TUBE INSERTION      COLON SURGERY      colostomy    COLONOSCOPY      FL GUIDED CENTRAL VENOUS ACCESS DEVICE INSERTION  03/21/2023    FL RETROGRADE PYELOGRAM  01/23/2023    FL RETROGRADE PYELOGRAM  04/03/2023    FL RETROGRADE PYELOGRAM  7/21/2023    FL RETROGRADE PYELOGRAM  10/11/2023    FL RETROGRADE PYELOGRAM  12/15/2023    IR BIOPSY LIVER MASS  05/09/2023    LUNG BIOPSY      VT CYSTO BLADDER W/URETERAL CATHETERIZATION Bilateral 07/21/2023    Procedure: CYSTOSCOPY URETEROSCOPY RETROGRADE PYELOGRAM WITH BILATERAL STENT URETERAL EXCHANGE; LEFT LASER LITHOTRIPSY AND STONE BASKET RETRIEVAL;  Surgeon: José Luis Stephens MD;  [facility];  Service: Urology    VT CYSTO BLADDER W/URETERAL CATHETERIZATION Bilateral 12/15/2023    Procedure: CYSTOSCOPY, BILATERAL  RETROGRADE PYELOGRAM , STENT EXCHANGE RIGHT , LEFT URETEROSCOPY ,  HOLMIUM LASER WITH INSERTION LEFT URETERAL STENT;  Surgeon: Derick Bhakta MD;  Location: [facility];  Service: Urology    VT CYSTO/URETERO W/LITHOTRIPSY &INDWELL STENT INSRT Bilateral 01/23/2023    Procedure: CYSTOSCOPY  RIGHT URETEROSCOPY WITH LITHOTRIPSY HOLMIUM LASER, BILATERAL RETROGRADE PYELOGRAM AND BILATERAL  URETERAL STENT INSERTION;  Surgeon: José Luis Stephens MD;  [facility];  Service: Urology    VT CYSTO/URETERO W/LITHOTRIPSY &INDWELL STENT INSRT Right 04/03/2023    Procedure: RIGHT URETEROSCOPY WITH LITHOTRIPSY HOLMIUM LASER,  RETROGRADE PYELOGRAM; BILATERAL EXCHANGE STENT URETERAL;  Surgeon: José Luis Stephens MD;  [facility];  Service: Urology    RI CYSTO/URETERO W/LITHOTRIPSY &INDWELL STENT INSRT Bilateral 10/11/2023    Procedure: CYSTOSCOPY URETEROSCOPY RETROGRADE PYELOGRAM AND INSERTION STENT URETERAL DIAGNOSTINC RIGHT URETEROSCOPY, REMOVAL STENT LEFT SIDE;  Surgeon: José Luis Stephens MD;  Location: [facility];  Service: Urology    RI INSJ TUNNELED CTR VAD W/SUBQ PORT AGE 5 YR/> N/A 03/21/2023    Procedure: INSERTION VENOUS PORT ( PORT-A-CATH) IR;  Surgeon: Mark Amos DO;  Location: [facility];  Service: Interventional Radiology    RI LAPS COLECTOMY PRTL W/COLOPXTSTMY LW ANAST N/A 8/17/2023    Procedure: RESECTION COLON LOW ANTERIOR LAPAROSCOPIC WITH ROBOTICS;  Surgeon: Sree Umanzor MD;  Location[facility];  Service: Colorectal    TONSILLECTOMY      URINARY SURGERY Bilateral     bilateral stents       Meds/Allergies:  Prior to Admission medications    Medication Sig Start Date End Date Taking? Authorizing Provider   acetaminophen (TYLENOL) 500 mg tablet Take 2 tablets (1,000 mg total) by mouth 3 (three) times a day 11/13/23   Koko Pillai MD   amLODIPine (NORVASC) 10 mg tablet Take 1 tablet (10 mg total) by mouth daily 7/19/23   Leena Anaya MD   cyanocobalamin (VITAMIN B-12) 1000 MCG tablet Take 1,000 mcg by mouth daily    Historical Provider, MD   fenofibrate (TRICOR) 145 mg tablet Take 1 tablet (145 mg total) by mouth daily 2/2/24 2/1/25  Akua Machuca MD   oxybutynin (DITROPAN) 5 mg tablet Take 1 tablet (5 mg total) by mouth 3 (three) times a day as needed (bladder spasms) 12/16/23   Dagmar Rangel DO   phenazopyridine (PYRIDIUM) 100 mg tablet Take 1 tablet (100 mg total) by mouth 3 (three) times a day with meals 12/16/23   Hetul Rangel, DO   potassium chloride (MICRO-K) 10 MEQ CR capsule Take 4 capsules (40 mEq total) by mouth 2 (two) times a day 10/3/23 1/4/24  Esperanza Molina DO  "  ursodiol (ACTIGALL) 300 mg capsule Take 3 capsules (900 mg total) by mouth in the morning 6/16/23 6/15/24  Akua Machuca MD     I have reviewed home medications with patient personally.    Allergies:   Allergies   Allergen Reactions    Oxaliplatin Shortness Of Breath     Reactions occurred with 2nd and 3rd infusions (about 1-3 hours from initiation of infusion) and required treatment with steroids and antihistamines. Please refer to allergy note on 2/7/2023 for detailed description of her reactions.    Morphine Nausea Only     \"Every dose made me nauseous\" (oral dosing only, can tolerate IV morphine)    Potassium Chloride Other (See Comments)     Pt reports \"burning\" with Iv potassium administration and wishes for it to be added to chart       Social History:  Marital Status:    Occupation: Unknown  Patient Pre-hospital Living Situation: Home  Patient Pre-hospital Level of Mobility: walks  Patient Pre-hospital Diet Restrictions: None  Substance Use History:   Social History     Substance and Sexual Activity   Alcohol Use Never     Social History     Tobacco Use   Smoking Status Never    Passive exposure: Past   Smokeless Tobacco Never     Social History     Substance and Sexual Activity   Drug Use Never       Family History:  Family History   Problem Relation Age of Onset    Cancer Mother     Cirrhosis Father     Breast cancer Neg Hx     Breast cancer additional onset Neg Hx        Physical Exam:     Vitals:   Blood Pressure: 160/72 (02/08/24 0016)  Pulse: 85 (02/08/24 0016)  Temperature: 98.2 °F (36.8 °C) (02/08/24 0016)  Temp Source: Oral (02/08/24 0016)  Respirations: 18 (02/08/24 0016)  SpO2: 97 % (02/08/24 0016)    Physical Exam  Vitals and nursing note reviewed.   Constitutional:       General: She is not in acute distress.     Appearance: She is not ill-appearing or diaphoretic.   HENT:      Head: Normocephalic.      Nose: Nose normal.      Mouth/Throat:      Pharynx: Oropharynx is clear.   Eyes:     "  General: No scleral icterus.     Extraocular Movements: Extraocular movements intact.      Conjunctiva/sclera: Conjunctivae normal.      Pupils: Pupils are equal, round, and reactive to light.   Cardiovascular:      Rate and Rhythm: Normal rate and regular rhythm.      Pulses: Normal pulses.      Heart sounds: Normal heart sounds. No murmur heard.  Pulmonary:      Effort: Pulmonary effort is normal. No respiratory distress.      Breath sounds: Normal breath sounds. No wheezing, rhonchi or rales.   Chest:      Chest wall: No tenderness.   Abdominal:      General: Bowel sounds are normal. There is no distension.      Palpations: Abdomen is soft.      Tenderness: There is no abdominal tenderness. There is no right CVA tenderness or left CVA tenderness.   Musculoskeletal:         General: Normal range of motion.      Cervical back: Normal range of motion.   Skin:     General: Skin is warm and dry.      Capillary Refill: Capillary refill takes 2 to 3 seconds.   Neurological:      Mental Status: She is alert and oriented to person, place, and time.          Additional Data:     Lab Results:  Results from last 7 days   Lab Units 02/07/24  2047   WBC Thousand/uL 7.04   HEMOGLOBIN g/dL 11.2*   HEMATOCRIT % 35.6   PLATELETS Thousands/uL 193   BANDS PCT % 2   LYMPHO PCT % 8*   MONO PCT % 3*   EOS PCT % 0     Results from last 7 days   Lab Units 02/07/24  2047   SODIUM mmol/L 138   POTASSIUM mmol/L 4.9   CHLORIDE mmol/L 107   CO2 mmol/L 22   BUN mg/dL 30*   CREATININE mg/dL 1.17   ANION GAP mmol/L 9   CALCIUM mg/dL 10.1   GLUCOSE RANDOM mg/dL 133                 Results from last 7 days   Lab Units 02/07/24  2047   PROCALCITONIN ng/ml 0.08       Lines/Drains:  Invasive Devices       Central Venous Catheter Line  Duration             Port A Cath 03/21/23 Right Internal jugular 323 days              Peripheral Intravenous Line  Duration             Peripheral IV 02/07/24 Right Antecubital <1 day              Drain  Duration              Ureteral Internal Stent Left ureter 6 Fr. 201 days    Ureteral Internal Stent Right ureter 7 Fr. 201 days    Ileostomy Loop  days    Ureteral Internal Stent Right ureter 7 Fr. 119 days                        Imaging: Reviewed radiology reports from this admission including: abdominal/pelvic CT and ultrasound(s)  CT renal stone study abdomen pelvis without contrast   Final Result by Stacie Talley MD (02/07 2143)      3 mm distal left ureteral calculus causing moderate hydroureteronephrosis.      Right-sided nephroureteral stent in place. There is moderate right hydronephrosis, similar to the prior study.      Nonobstructing bilateral renal calculi.      Slight interval increase in the size of several subcentimeter pulmonary nodules in the visualized lung bases. Metastasis cannot be excluded. Follow-up is recommended.      The study was marked in EPIC for immediate notification.         Workstation performed: [id number]         US kidney and bladder   Final Result by Cyndy Pittman MD (02/07 1932)      Mild right-sided hydronephrosis. Proximal end of the right ureteral stent is not clearly visualized with sonographic technique. Distal tip of right ureteral stent seen within the urinary bladder.      Moderate left-sided hydronephrosis.            Workstation performed: [id number]             EKG and Other Studies Reviewed on Admission:   EKG: Sinus Tachycardia. .    ** Please Note: This note has been constructed using a voice recognition system. **

## 2024-02-08 NOTE — ASSESSMENT & PLAN NOTE
"CT A/P: \"Slight interval increase in the size of several subcentimeter pulmonary nodules in the visualized lung bases. Metastasis cannot be excluded.\"  Follow up as an outpatient.  "

## 2024-02-08 NOTE — OP NOTE
Operative Note     PATIENT:  Danielle Haque (MRN [id number])    DATE OF PROCEDURE:   2/8/2024    PRE-OP DIAGNOSIS:   1) complex nephrolithiasis  2) complex ureteral stricture disease  3 advanced rectal cancer    POST-OP DIAGNOSIS:   1) complex nephrolithiasis  2) complex ureteral stricture disease  3 advanced rectal cancer    PROCEDURES PERFORMED:  1) Cystoscopy  2) Left retrograde pyelography with fluoroscopic interpretation  3) Left ureteroscopy with laser lithotripsy and basket extraction of stone; and dilation of left ureteral stricture  4) Left ureteral stent placement  #5 right ureteral stent exchange    SURGEON:  José Luis Stephens MD    NOTE:  There were no qualified teaching residents to assist with this case    ANESTHESIA: Choice     COMPLICATIONS:   None    ANTIBIOTICS:  Ancef    INTRAOPERATIVE THROMBOEMBOLISM PROPHYLAXIS:  Pneumatic compression stockings     FINDINGS:  -Left retrograde pyelogram as well as diagnostic ureteroscopy demonstrated recurrent left ureteral stricture disease.  There is a distal ureteral stricture extending to the level of the iliac vessels.  This is felt to be consistent with the patient's prior history of radiation for colorectal cancer  - The small stone in the ureter was associated with the stricture.  I was able to extract this using gentle basket extraction after performing a dilation of the ureteral stricture  - Due to the severity of the stricture, I was unable to access the upper urinary tract in order to address her left intrarenal stones  - A postoperative left ureteral stent was left in place without an external string  - The patient's chronic right ureteral stent was also exchanged today.    INDICATIONS FOR PROCEDURE:  Danielle Haque is an 58 y.o. old female known to me with chronic complex nephrolithiasis, and ureteral strictures, as well as radiation ureteritis.  At this point she has managed with a chronic right ureteral stent for stricture disease.  She presents  acutely with left renal colic.  CT scan demonstrates a small stone in the distal ureter and findings suspicious for possible ureteral stricture disease.  After discussing the options, the patient elected to undergo ureteroscopy and ureteral stent placement.  We discussed the procedure in detail, the alternatives, and the risks, and they signed informed consent to proceed.    PROCEDURE IN DETAIL:   The patient was identified and brought to the OR.  Antibiotic prophylaxis and DVT prophylaxis were administered.  They were placed in the comfortable dorsal lithotomy position with care to pad all pressure points.  They were prepped and draped in the usual sterile fashion using hibiclens.  A surgical time out was performed with all in the room in agreement with the correct patient, procedure, indications, and laterality.  A 21-Maldivian rigid cystoscope was used to enter the bladder.  The bladder was inspected in its entirety and there were no lesions noted.  The ureteral orifices were identified in their normal orthotopic positions.     The Left ureteral orifice was identified and a 5 Fr open ended catheter was placed into the ureteral orifice  .   A retrograde pyelogram was performed with the findings as described above.  A Sensor wire was advanced up to the kidney under fluoroscopic guidance.  Leaving this safety wire in place, the bladder was drained.       A   7.5 Maldivian semi-rigid ureteroscope was advanced up the ureter under vision .     Significant distal ureteral stricture disease is noted.  I am unable to advance the semirigid ureteroscope initially.  I therefore placed a second wire and utilized this to perform a dilation using a ureteral access sheath.  First the inner, then the outer obturator of a 10/12 Maldivian ureteral access sheath were placed using fluoroscopic guidance with a good radiographic and endoscopic result for managing the stricture.  Following this maneuver I was able to advance the semirigid  ureteroscope and identify a small mid ureteral stone.  This was removed using a 1.9 Luxembourgish nitinol tip stone basket and submitted for specimen    Due to the severity of the ureteral stricture disease I did not feel it was possible to advance instrumentation to the renal pelvis in order to address her known intrarenal calculi.     The ureteroscope was backed down the ureter under vision and there were no residual fragments and the ureter was noted to be intact with no injury and significant edema where the stone was located.   A JJ stent was then passed up the wire  under fluoroscopic guidance into the kidney with a good curl noted in the kidney and in the bladder.   . The bladder was drained.      I then performed an uncomplicated contralateral right ureteral stent exchange.    The patient was placed back supine, awakened from general anesthesia and brought to recovery room in stable condition.      ESTIMATED BLOOD LOSS:  Minimal      SPECIMENS:   Order Name Source Comment Collection Info Order Time   STONE ANALYSIS Ureter, Left  Collected By: José Luis Stephens MD 2/8/2024  1:31 PM   TISSUE EXAM Ureter, Left  Collected By: José Luis Stephens MD 2/8/2024  1:31 PM     Release to patient through [employer]   Immediate             IMPLANTS:   Implant Name Type Inv. Item Serial No.  Lot No. LRB No. Used Action   STENT URETERAL 6FR 22CM INLAY OPTIMA W/O GW - [id number]  STENT URETERAL 6FR 22CM INLAY OPTIMA W/O GW  [employer] [id number] Right 1 Explanted   STENT URETERAL 6FR 22CM INLAY OPTIMA W/NITINOL GDWR - [id number]  STENT URETERAL 6FR 22CM INLAY OPTIMA W/NITINOL GDWR  [employer] [id number] Right 1 Implanted   STENT URETERAL 6FR 22CM INLAY OPTIMA W/O GW - [id number]  STENT URETERAL 6FR 22CM INLAY OPTIMA W/O GW  [employer] [id number] Left 1 Explanted   STENT URETERAL 6 FR 26CM INLAY OPTIMA - [id number]  STENT URETERAL 6 FR 26CM INLAY OPTIMA  [employer] [id number] Left 1  Implanted        COMPLICATIONS: None    DISPOSITION: PACU    PLAN:  Findings at time of surgery reviewed with the patient.  I will plan to maintain her bilateral ureteral stents for the time being    I will schedule her for her next surgical intervention in 3 months at which time I will exchange her right ureteral stent as well as perform diagnostic ureteroscopy on the left and holmium laser lithotripsy of any remaining stones.  If her ureter appears nice and patent at that time we may consider a trial without the stent postoperatively.  If there is concern for residual or recurrent stricture disease I will plan to maintain her bilateral ureteral stents chronically.    She will return to our office in 6 weeks time for a interval visit with our advanced practitioner team with a KUB    Patient is medically stable for discharge from urologic standpoint at the discretion of the primary team.

## 2024-02-08 NOTE — CONSULTS
Consult - Urology   Danielle Haque 1965, 58 y.o. female MRN: [id number]    Unit/Bed#: OR [place] Encounter: [id number]        Assessment & Plan:  Left ureteral calculus  Right ureteral stricture w/ chronic right ureteral stent  -CT showing 3 mm distal left ureteral calculus causing moderate hydroureteronephrosis. Right-sided nephroureteral stent in place. There is moderate right hydronephrosis, similar to the prior study.   -WBC 4  -Creatinine 1.1, baseline  -UA moderate leukocytes, nitrite negative.  No bacteria  -No flank pain.  Reporting dysuria, lower abdominal pain, worse with urination.  Reports punch colored urine  -Discussed with patient options including conservative management with medical expulsive therapy versus operative intervention.  Patient elected for operative intervention.  -Discussed with patient cystoscopy, L ureteroscopy, holmium laser lithotripsy, basket stone extraction, retrograde pyelogram, insertion of L ureteral stent.  Exchange of right ureteral stent. discussed risk associated with procedure including risk with anesthesia, bleeding, infection, damage to kidneys, ureter, bladder, inability to treat stone, ureteral stricture, need for delayed ureteroscopy for any signs of infection.  Discussed stent colic including frequency, urgency, hematuria, flank pain.  - Consent signed  - Proceed to OR       Subjective:   Jackie Haque is a 58-year-old female with history of rectal cancer, history of chemoradiation, she is known to our practice for history of right ureteral stricture managed with chronic right ureteral stent.  Patient underwent cystoscopy, left ureteroscopy, holmium laser lithotripsy on 12/15/2023 with Dr. Bhakta for obstructing left ureteral calculus.  She underwent left ureteral stent removal on 1/16/2024.  Patient had been doing well with stent removal until 2 days ago when she started to have abdominal pain, hematuria, dysuria.  She presented to the ED due to worsening  "symptoms.  In the ED CT scan was done showing a 3 mm distal left ureteral calculus associated with moderate hydroureteronephrosis.  Due to persistent pain she was admitted to the medicine service.  Urology consulted for further recommendations    Review of Systems   Constitutional:  Negative for chills and fever.   Respiratory:  Negative for cough and shortness of breath.    Cardiovascular:  Negative for chest pain and palpitations.   Gastrointestinal:  Positive for abdominal pain. Negative for vomiting.   Genitourinary:  Positive for dysuria and hematuria.   Musculoskeletal:  Negative for arthralgias and back pain.   Skin:  Negative for color change and rash.   Neurological:  Negative for seizures and syncope.   All other systems reviewed and are negative.      Objective:  Vitals: Blood pressure 116/56, pulse 75, temperature (!) 97.1 °F (36.2 °C), temperature source Temporal, resp. rate 18, height 5' 2\" (1.575 m), weight 66.2 kg (146 lb), SpO2 98%.,Body mass index is 26.7 kg/m².    Physical Exam  Vitals reviewed.   Constitutional:       General: She is not in acute distress.     Appearance: Normal appearance. She is normal weight. She is not ill-appearing, toxic-appearing or diaphoretic.   HENT:      Head: Normocephalic and atraumatic.      Right Ear: External ear normal.      Left Ear: External ear normal.      Nose: Nose normal.      Mouth/Throat:      Mouth: Mucous membranes are moist.   Eyes:      General: No scleral icterus.     Conjunctiva/sclera: Conjunctivae normal.   Cardiovascular:      Rate and Rhythm: Normal rate and regular rhythm.      Pulses: Normal pulses.      Heart sounds: Normal heart sounds. No murmur heard.     No friction rub. No gallop.   Pulmonary:      Effort: Pulmonary effort is normal. No respiratory distress.      Breath sounds: Normal breath sounds. No stridor. No wheezing, rhonchi or rales.   Abdominal:      General: Abdomen is flat. There is no distension.      Palpations: Abdomen is " soft.      Tenderness: There is no abdominal tenderness. There is no right CVA tenderness, left CVA tenderness or guarding.   Musculoskeletal:         General: Normal range of motion.      Cervical back: Normal range of motion.   Skin:     General: Skin is warm.      Findings: No rash.   Neurological:      General: No focal deficit present.      Mental Status: She is alert and oriented to person, place, and time. Mental status is at baseline.   Psychiatric:         Mood and Affect: Mood normal.         Behavior: Behavior normal.         Thought Content: Thought content normal.         Judgment: Judgment normal.         Imaging:  CT ABDOMEN AND PELVIS WITHOUT IV CONTRAST - LOW DOSE RENAL STONE     INDICATION: new L sided hydronephrosis.     COMPARISON: 12/14/2023.     TECHNIQUE: Low radiation dose thin section CT examination of the abdomen and pelvis was performed without intravenous or oral contrast according to a protocol specifically designed to evaluate for urinary tract calculus. Axial, sagittal, and coronal 2D   reformatted images were created from the source data and submitted for interpretation. Evaluation for pathology in the abdomen and pelvis that is unrelated to urinary tract calculi is limited.     Radiation dose length product (DLP) for this visit: 146 mGy-cm . This examination, like all CT scans performed in the [facility], was performed utilizing techniques to minimize radiation dose exposure, including the use of iterative   reconstruction and automated exposure control.     URINARY TRACT FINDINGS:     RIGHT KIDNEY AND URETER: Multiple punctate nonobstructing intrarenal calculi. Right-sided nephroureteral stent is in place. Kadi-stent calcifications in the proximal portion unchanged. No significant change in moderate right hydronephrosis.     LEFT KIDNEY AND URETER: Multiple punctate nonobstructing intrarenal calculi. 3 mm calculus in the distal portion of the left ureter causing  moderate hydroureteronephrosis.     URINARY BLADDER: Right-sided nephroureteral stent terminates in the bladder.        ADDITIONAL FINDINGS:     LOWER CHEST: Scarring in the left lower lobe. Slight interval increase in the size of several subcentimeter pulmonary nodules in the visualized lung bases. For example, 6 mm left lower lobe nodule (301/3), previously 4 mm) and 6 mm right lower lobe   nodule with central cavitation (301/4), previously 5 mm.     SOLID VISCERA: Stable 7 mm right adrenal nodule, likely a benign adenoma. Limited low radiation dose noncontrast CT evaluation demonstrates no clinically significant abnormality of the imaged portions of the liver, spleen, pancreas, or left adrenal   gland.     GALLBLADDER/BILIARY TREE: No calcified gallstones. No pericholecystic inflammatory change. No biliary dilation.     STOMACH AND BOWEL: Postsurgical changes in the rectum. Loop ileostomy in the right lower quadrant. No bowel obstruction.     APPENDIX: No findings to suggest appendicitis.     ABDOMINOPELVIC CAVITY: No ascites. No pneumoperitoneum. No lymphadenopathy.     VESSELS: Unremarkable for patient's age.     REPRODUCTIVE ORGANS: Unremarkable for patient's age.     ABDOMINAL WALL/INGUINAL REGIONS: Scarring in the anterior abdominal wall.     BONES: No acute fracture or suspicious osseous lesion.     IMPRESSION:     3 mm distal left ureteral calculus causing moderate hydroureteronephrosis.     Right-sided nephroureteral stent in place. There is moderate right hydronephrosis, similar to the prior study.     Nonobstructing bilateral renal calculi.    Labs:  Recent Labs     02/07/24 2047 02/08/24  0545   WBC 7.04 4.26*     Recent Labs     02/07/24 2047 02/08/24  0545   HGB 11.2* 9.6*       Recent Labs     02/07/24 2047 02/08/24  0545   CREATININE 1.17 1.10         History:  Social History     Socioeconomic History    Marital status:      Spouse name: None    Number of children: None    Years of  education: None    Highest education level: None   Occupational History    None   Tobacco Use    Smoking status: Never     Passive exposure: Past    Smokeless tobacco: Never   Vaping Use    Vaping status: Never Used   Substance and Sexual Activity    Alcohol use: Never    Drug use: Never    Sexual activity: Not Currently   Other Topics Concern    None   Social History Narrative    From 4/14/23  note:    Emergency Contact: MATEO TREVIZO (ex-) [phone] (Mobile)    Marital Status:     Caregiver/Support: ex- -Mateo and two dtrs.     Children: two dtrs- nataliia 21 in [place] and doretha 18 [facility]    Child/Elder care: no     Housing: two story house    Home Setup: one level    Lives With:  -mateo and two dtrs    Daily Living Activities: independent    Durable Medical Equipment: No    Ambulation: independent    Employment: disabled-SSI-$100 and pension-$700     Status/Location: no     Ability to pay bills: yes. No barriers to paying monthly bills.      POA/LW/AD: no.  - Mateo is healthcare representative.  MSW emailed advance directive.          Social Determinants of Health     Financial Resource Strain: Low Risk  (11/30/2022)    Received from "[employer].    Financial Resource Strain     In the last 12 months, did you worry that your food could run out before you got money to buy more? : No   Food Insecurity: No Food Insecurity (8/18/2023)    Hunger Vital Sign     Worried About Running Out of Food in the Last Year: Never true     Ran Out of Food in the Last Year: Never true   Transportation Needs: No Transportation Needs (8/18/2023)    PRAPARE - Transportation     Lack of Transportation (Medical): No     Lack of Transportation (Non-Medical): No   Physical Activity: Not on file   Stress: Not on file   Social Connections: Not on file   Intimate Partner Violence: Low Risk  (11/30/2022)    Received from "[employer].    Violence      Does anyone in your life hurt you, threaten you, frighten you or make you feel unsafe?: No   Housing Stability: Low Risk  (8/18/2023)    Housing Stability Vital Sign     Unable to Pay for Housing in the Last Year: No     Number of Places Lived in the Last Year: 1     Unstable Housing in the Last Year: No       Past Medical History:   Diagnosis Date    Cancer (HCC)     Colon cancer (HCC)     Fall     Headache     Hemochromatosis, unspecified     History of transfusion     2022 - no adverse reaction    Hypertension     Kidney calculi     Kidney stone     Liver disease     hemangioma    Muscle weakness     Personal history of COVID-19 12/2022    Sarcoidosis     Seizures (HCC)     2022    Shingles      Past Surgical History:   Procedure Laterality Date    ABSCESS DRAINAGE      left breast    BREAST BIOPSY      CHEST TUBE INSERTION      COLON SURGERY      colostomy    COLONOSCOPY      FL GUIDED CENTRAL VENOUS ACCESS DEVICE INSERTION  03/21/2023    FL RETROGRADE PYELOGRAM  01/23/2023    FL RETROGRADE PYELOGRAM  04/03/2023    FL RETROGRADE PYELOGRAM  7/21/2023    FL RETROGRADE PYELOGRAM  10/11/2023    FL RETROGRADE PYELOGRAM  12/15/2023    IR BIOPSY LIVER MASS  05/09/2023    LUNG BIOPSY      WV CYSTO BLADDER W/URETERAL CATHETERIZATION Bilateral 07/21/2023    Procedure: CYSTOSCOPY URETEROSCOPY RETROGRADE PYELOGRAM WITH BILATERAL STENT URETERAL EXCHANGE; LEFT LASER LITHOTRIPSY AND STONE BASKET RETRIEVAL;  Surgeon: José Luis Stephens MD;  [facility];  Service: Urology    WV CYSTO BLADDER W/URETERAL CATHETERIZATION Bilateral 12/15/2023    Procedure: CYSTOSCOPY, BILATERAL  RETROGRADE PYELOGRAM , STENT EXCHANGE RIGHT , LEFT URETEROSCOPY ,  HOLMIUM LASER WITH INSERTION LEFT URETERAL STENT;  Surgeon: Derick Bhakta MD;  [facility];  Service: Urology    WV CYSTO/URETERO W/LITHOTRIPSY &INDWELL STENT INSRT Bilateral 01/23/2023    Procedure: CYSTOSCOPY  RIGHT URETEROSCOPY WITH LITHOTRIPSY HOLMIUM  LASER, BILATERAL RETROGRADE PYELOGRAM AND BILATERAL  URETERAL STENT INSERTION;  Surgeon: José Luis Stephens MD;  [facility];  Service: Urology    IN CYSTO/URETERO W/LITHOTRIPSY &INDWELL STENT INSRT Right 04/03/2023    Procedure: RIGHT URETEROSCOPY WITH LITHOTRIPSY HOLMIUM LASER, RETROGRADE PYELOGRAM; BILATERAL EXCHANGE STENT URETERAL;  Surgeon: José Luis Stephens MD;  [facility];  Service: Urology    IN CYSTO/URETERO W/LITHOTRIPSY &INDWELL STENT INSRT Bilateral 10/11/2023    Procedure: CYSTOSCOPY URETEROSCOPY RETROGRADE PYELOGRAM AND INSERTION STENT URETERAL DIAGNOSTINC RIGHT URETEROSCOPY, REMOVAL STENT LEFT SIDE;  Surgeon: José Luis Stephens MD;  [facility];  Service: Urology    IN INSJ TUNNELED CTR VAD W/SUBQ PORT AGE 5 YR/> N/A 03/21/2023    Procedure: INSERTION VENOUS PORT ( PORT-A-CATH) IR;  Surgeon: Mark Amos DO;  [facility];  Service: Interventional Radiology    IN LAPS COLECTOMY PRTL W/COLOPXTSTMY LW ANAST N/A 8/17/2023    Procedure: RESECTION COLON LOW ANTERIOR LAPAROSCOPIC WITH ROBOTICS;  Surgeon: Sree Umanzor MD;  [facility];  Service: Colorectal    TONSILLECTOMY      URINARY SURGERY Bilateral     bilateral stents     Family History   Problem Relation Age of Onset    Cancer Mother     Cirrhosis Father     Breast cancer Neg Hx     Breast cancer additional onset Neg Hx        Brittany Miles PA-C  Date: 2/8/2024 Time: 11:51 AM

## 2024-02-08 NOTE — ANESTHESIA POSTPROCEDURE EVALUATION
Post-Op Assessment Note    CV Status:  Stable  Pain Score: 0    Pain management: adequate       Mental Status:  Arousable and sleepy   Hydration Status:  Euvolemic and stable   PONV Controlled:  Controlled   Airway Patency:  Patent and adequate     Post Op Vitals Reviewed: Yes    No anethesia notable event occurred.    Staff: CRNA               BP   141/68   Temp 97.4   Pulse 76   Resp 10   SpO2   97% 2L NC

## 2024-02-08 NOTE — TELEPHONE ENCOUNTER
Complex patient with kidney stones and ureteral stricture disease well-known to me    She underwent her right stent exchange as well as a left ureteroscopy and stent insertion today while on-call    She was currently scheduled for elective right ureteral stent exchange in 2 months    Joellen please reschedule her surgery for 3 months from now.  Also please change that case to a bilateral procedure:     Cystoscopy with right ureteral stent exchange, left ureteroscopy, holmium laser lithotripsy, and left ureteral stent exchange    Ana Luisa please also schedule her for an AP visit in 6 weeks with a KUB to monitor her stent.  Thank you

## 2024-02-08 NOTE — PLAN OF CARE
Problem: PAIN - ADULT  Goal: Verbalizes/displays adequate comfort level or baseline comfort level  Description: Interventions:  - Encourage patient to monitor pain and request assistance  - Assess pain using appropriate pain scale  - Administer analgesics based on type and severity of pain and evaluate response  - Implement non-pharmacological measures as appropriate and evaluate response  - Consider cultural and social influences on pain and pain management  - Notify physician/advanced practitioner if interventions unsuccessful or patient reports new pain  Outcome: Progressing     Problem: INFECTION - ADULT  Goal: Absence or prevention of progression during hospitalization  Description: INTERVENTIONS:  - Assess and monitor for signs and symptoms of infection  - Monitor lab/diagnostic results  - Monitor all insertion sites, i.e. indwelling lines, tubes, and drains  - Monitor endotracheal if appropriate and nasal secretions for changes in amount and color  - [place] appropriate cooling/warming therapies per order  - Administer medications as ordered  - Instruct and encourage patient and family to use good hand hygiene technique  - Identify and instruct in appropriate isolation precautions for identified infection/condition  Outcome: Progressing  Goal: Absence of fever/infection during neutropenic period  Description: INTERVENTIONS:  - Monitor WBC    Outcome: Progressing     Problem: SAFETY ADULT  Goal: Patient will remain free of falls  Description: INTERVENTIONS:  - Educate patient/family on patient safety including physical limitations  - Instruct patient to call for assistance with activity   - Consult OT/PT to assist with strengthening/mobility   - Keep Call bell within reach  - Keep bed low and locked with side rails adjusted as appropriate  - Keep care items and personal belongings within reach  - Initiate and maintain comfort rounds  - Make Fall Risk Sign visible to staff  - Offer Toileting every 2 Hours,  in advance of need  - Initiate/Maintain bed alarm  - Obtain necessary fall risk management equipment:   - Apply yellow socks and bracelet for high fall risk patients  - Consider moving patient to room near nurses station  Outcome: Progressing  Goal: Maintain or return to baseline ADL function  Description: INTERVENTIONS:  -  Assess patient's ability to carry out ADLs; assess patient's baseline for ADL function and identify physical deficits which impact ability to perform ADLs (bathing, care of mouth/teeth, toileting, grooming, dressing, etc.)  - Assess/evaluate cause of self-care deficits   - Assess range of motion  - Assess patient's mobility; develop plan if impaired  - Assess patient's need for assistive devices and provide as appropriate  - Encourage maximum independence but intervene and supervise when necessary  - Involve family in performance of ADLs  - Assess for home care needs following discharge   - Consider OT consult to assist with ADL evaluation and planning for discharge  - Provide patient education as appropriate  Outcome: Progressing  Goal: Maintains/Returns to pre admission functional level  Description: INTERVENTIONS:  - Perform AM-PAC 6 Click Basic Mobility/ Daily Activity assessment daily.  - Set and communicate daily mobility goal to care team and patient/family/caregiver.   - Collaborate with rehabilitation services on mobility goals if consulted  - Perform Range of Motion 2 times a day.  - Reposition patient every 2 hours.  - Dangle patient 2 times a day  - Stand patient 2 times a day  - Ambulate patient 2 times a day  - Out of bed to chair 2 times a day   - Out of bed for meals 2 times a day  - Out of bed for toileting  - Record patient progress and toleration of activity level   Outcome: Progressing

## 2024-02-09 VITALS
RESPIRATION RATE: 14 BRPM | SYSTOLIC BLOOD PRESSURE: 134 MMHG | OXYGEN SATURATION: 94 % | HEART RATE: 81 BPM | BODY MASS INDEX: 26.87 KG/M2 | WEIGHT: 146 LBS | HEIGHT: 62 IN | DIASTOLIC BLOOD PRESSURE: 67 MMHG | TEMPERATURE: 97.7 F

## 2024-02-09 LAB
BASOPHILS # BLD AUTO: 0 THOUSANDS/ÂΜL (ref 0–0.1)
BASOPHILS NFR BLD AUTO: 0 % (ref 0–1)
EOSINOPHIL # BLD AUTO: 0 THOUSAND/ÂΜL (ref 0–0.61)
EOSINOPHIL NFR BLD AUTO: 0 % (ref 0–6)
ERYTHROCYTE [DISTWIDTH] IN BLOOD BY AUTOMATED COUNT: 15.7 % (ref 11.6–15.1)
HCT VFR BLD AUTO: 30.4 % (ref 34.8–46.1)
HGB BLD-MCNC: 9.6 G/DL (ref 11.5–15.4)
IMM GRANULOCYTES # BLD AUTO: 0.02 THOUSAND/UL (ref 0–0.2)
IMM GRANULOCYTES NFR BLD AUTO: 1 % (ref 0–2)
LYMPHOCYTES # BLD AUTO: 0.29 THOUSANDS/ÂΜL (ref 0.6–4.47)
LYMPHOCYTES NFR BLD AUTO: 7 % (ref 14–44)
MCH RBC QN AUTO: 25.8 PG (ref 26.8–34.3)
MCHC RBC AUTO-ENTMCNC: 31.6 G/DL (ref 31.4–37.4)
MCV RBC AUTO: 82 FL (ref 82–98)
MONOCYTES # BLD AUTO: 0.22 THOUSAND/ÂΜL (ref 0.17–1.22)
MONOCYTES NFR BLD AUTO: 5 % (ref 4–12)
NEUTROPHILS # BLD AUTO: 3.91 THOUSANDS/ÂΜL (ref 1.85–7.62)
NEUTS SEG NFR BLD AUTO: 87 % (ref 43–75)
NRBC BLD AUTO-RTO: 0 /100 WBCS
PLATELET # BLD AUTO: 171 THOUSANDS/UL (ref 149–390)
PMV BLD AUTO: 8.7 FL (ref 8.9–12.7)
RBC # BLD AUTO: 3.72 MILLION/UL (ref 3.81–5.12)
WBC # BLD AUTO: 4.44 THOUSAND/UL (ref 4.31–10.16)

## 2024-02-09 PROCEDURE — 85025 COMPLETE CBC W/AUTO DIFF WBC: CPT | Performed by: UROLOGY

## 2024-02-09 PROCEDURE — 99232 SBSQ HOSP IP/OBS MODERATE 35: CPT | Performed by: PHYSICIAN ASSISTANT

## 2024-02-09 PROCEDURE — 99239 HOSP IP/OBS DSCHRG MGMT >30: CPT | Performed by: INTERNAL MEDICINE

## 2024-02-09 RX ADMIN — AMLODIPINE BESYLATE 10 MG: 10 TABLET ORAL at 08:18

## 2024-02-09 RX ADMIN — HYDROMORPHONE HYDROCHLORIDE 2 MG: 2 TABLET ORAL at 01:33

## 2024-02-09 RX ADMIN — Medication 125 MG: at 08:29

## 2024-02-09 RX ADMIN — CYANOCOBALAMIN TAB 500 MCG 1000 MCG: 500 TAB at 08:18

## 2024-02-09 RX ADMIN — DEXTROSE 1000 MG: 50 INJECTION, SOLUTION INTRAVENOUS at 01:33

## 2024-02-09 RX ADMIN — URSODIOL 900 MG: 300 CAPSULE ORAL at 08:20

## 2024-02-09 RX ADMIN — FENOFIBRATE 145 MG: 145 TABLET ORAL at 08:18

## 2024-02-09 NOTE — ASSESSMENT & PLAN NOTE
"Presentation: Patient presents with complaints of dysuria, difficulty urinating and hematuria for the past week or more. Patient has a history of kidney stones and currently has a right ureteral stent in place.   CT: \"3 mm distal left ureteral calculus causing moderate hydroureteronephrosis. Right-sided nephroureteral stent in place. There is moderate right hydronephrosis, similar to the prior study. Nonobstructing bilateral renal calculi.\"  S/p cystoscopy with left ureteral stent placement, right ureteral stent exchange per urology  Urology plans for outpatient follow-up noted    "

## 2024-02-09 NOTE — PLAN OF CARE
Problem: PAIN - ADULT  Goal: Verbalizes/displays adequate comfort level or baseline comfort level  Description: Interventions:  - Encourage patient to monitor pain and request assistance  - Assess pain using appropriate pain scale  - Administer analgesics based on type and severity of pain and evaluate response  - Implement non-pharmacological measures as appropriate and evaluate response  - Consider cultural and social influences on pain and pain management  - Notify physician/advanced practitioner if interventions unsuccessful or patient reports new pain  Outcome: Progressing     Problem: INFECTION - ADULT  Goal: Absence or prevention of progression during hospitalization  Description: INTERVENTIONS:  - Assess and monitor for signs and symptoms of infection  - Monitor lab/diagnostic results  - Monitor all insertion sites, i.e. indwelling lines, tubes, and drains  - Monitor endotracheal if appropriate and nasal secretions for changes in amount and color  - [place] appropriate cooling/warming therapies per order  - Administer medications as ordered  - Instruct and encourage patient and family to use good hand hygiene technique  - Identify and instruct in appropriate isolation precautions for identified infection/condition  Outcome: Progressing  Goal: Absence of fever/infection during neutropenic period  Description: INTERVENTIONS:  - Monitor WBC    Outcome: Progressing     Problem: SAFETY ADULT  Goal: Patient will remain free of falls  Description: INTERVENTIONS:  - Educate patient/family on patient safety including physical limitations  - Instruct patient to call for assistance with activity   - Consult OT/PT to assist with strengthening/mobility   - Keep Call bell within reach  - Keep bed low and locked with side rails adjusted as appropriate  - Keep care items and personal belongings within reach  - Initiate and maintain comfort rounds  - Make Fall Risk Sign visible to staff  - Offer Toileting every  Hours,  in advance of need  - Initiate/Maintain alarm  - Obtain necessary fall risk management equipment:   - Apply yellow socks and bracelet for high fall risk patients  - Consider moving patient to room near nurses station  Outcome: Progressing  Goal: Maintain or return to baseline ADL function  Description: INTERVENTIONS:  -  Assess patient's ability to carry out ADLs; assess patient's baseline for ADL function and identify physical deficits which impact ability to perform ADLs (bathing, care of mouth/teeth, toileting, grooming, dressing, etc.)  - Assess/evaluate cause of self-care deficits   - Assess range of motion  - Assess patient's mobility; develop plan if impaired  - Assess patient's need for assistive devices and provide as appropriate  - Encourage maximum independence but intervene and supervise when necessary  - Involve family in performance of ADLs  - Assess for home care needs following discharge   - Consider OT consult to assist with ADL evaluation and planning for discharge  - Provide patient education as appropriate  Outcome: Progressing  Goal: Maintains/Returns to pre admission functional level  Description: INTERVENTIONS:  - Perform AM-PAC 6 Click Basic Mobility/ Daily Activity assessment daily.  - Set and communicate daily mobility goal to care team and patient/family/caregiver.   - Collaborate with rehabilitation services on mobility goals if consulted  - Perform Range of Motion  times a day.  - Reposition patient every  hours.  - Dangle patient  times a day  - Stand patient  times a day  - Ambulate patient  times a day  - Out of bed to chair  times a day   - Out of bed for meals  times a day  - Out of bed for toileting  - Record patient progress and toleration of activity level   Outcome: Progressing

## 2024-02-09 NOTE — DISCHARGE SUMMARY
"[facility]  Discharge- Danielle Haque 1965, 58 y.o. female MRN: [id number]  Unit/Bed#: W -01 Encounter: [id number]  Primary Care Provider: Leena Anaya MD   Date and time admitted to hospital: 2/7/2024  5:26 PM    * Ureteral calculi with hydroureteronephrosis  Assessment & Plan  Presentation: Patient presents with complaints of dysuria, difficulty urinating and hematuria for the past week or more. Patient has a history of kidney stones and currently has a right ureteral stent in place.   CT: \"3 mm distal left ureteral calculus causing moderate hydroureteronephrosis. Right-sided nephroureteral stent in place. There is moderate right hydronephrosis, similar to the prior study. Nonobstructing bilateral renal calculi.\"  S/p cystoscopy with left ureteral stent placement, right ureteral stent exchange per urology  Urology plans for outpatient follow-up noted      Pulmonary nodules  Assessment & Plan  CT A/P: \"Slight interval increase in the size of several subcentimeter pulmonary nodules in the visualized lung bases. Metastasis cannot be excluded.\"  Outpatient follow-up recommended    Rectal cancer (HCC)  Assessment & Plan  Colostomy care  Outpatient oncology follow-up      Sarcoidosis  Assessment & Plan  Outpatient follow-up    Hypertension  Assessment & Plan  Blood pressures reviewed, acceptable  Continue amlodipine  Recommend low-salt diet  Ambulatory blood pressure monitoring outpatient follow-up           Discharge Summary - [facility]    Patient Information: Danielle Haque 58 y.o. female MRN: [id number]  Unit/Bed#: W -01 Encounter: [id number]    Discharging Physician / Practitioner: Bhupinder Hsieh MD  PCP: Leena Anaya MD  Admission Date: 2/7/2024  Discharge Date: 02/09/24    Disposition:     Home    Reason for Admission: Dysuria, inability to pass urine, hematuria    Discharge Diagnoses:     Principal Problem:    Ureteral calculi with " hydroureteronephrosis  Active Problems:    Hypertension    Sarcoidosis    Acute cystitis with hematuria    Rectal cancer (HCC)    Pulmonary nodules  Resolved Problems:    * No resolved hospital problems. *      Consultations During Hospital Stay:  Urology    Procedures Performed:     KUB ultrasound mild right hydronephrosis.  Proximal end of right ureteral stent is not clearly visualized.  Distal tip of right ureteral stent seen within the urinary bladder.  Moderate left-sided hydronephrosis.    CT renal study abdomen pelvis  3 mm distal left ureteral calculus causing moderate hydroureteronephrosis.     Right-sided nephroureteral stent in place. There is moderate right hydronephrosis, similar to the prior study.     Nonobstructing bilateral renal calculi.     Slight interval increase in the size of several subcentimeter pulmonary nodules in the visualized lung bases. Metastasis cannot be excluded.    PROCEDURES PERFORMED:  1) Cystoscopy  2) Left retrograde pyelography with fluoroscopic interpretation  3) Left ureteroscopy with laser lithotripsy and basket extraction of stone; and dilation of left ureteral stricture  4) Left ureteral stent placement  5) right ureteral stent exchange        Hospital Course:     Danielle Haque is a 58 y.o. female patient who originally presented to the hospital on 2/7/2024 due to dysuria and inability to pass urine, with UTI.  Patient was admitted seen in consultation with urology.  She underwent aging studies with results as outlined above.  She underwent cystoscopy with left total stent placement, right ureteral stent exchange.  She reports clinical and symptomatic improvement and is deemed ready for discharge today.  She will have close outpatient follow-up with urology.  Kindly review the chart for details.        Condition at Discharge: fair     Discharge Day Visit / Exam:     Subjective:      Comfortably in bed  Reports feeling better  Agreeable to discharge plan  History chart  "labs medications reviewed    Vitals: Blood Pressure: 134/67 (02/09/24 0819)  Pulse: 81 (02/09/24 0819)  Temperature: 97.7 °F (36.5 °C) (02/09/24 0819)  Temp Source: Temporal (02/08/24 1051)  Respirations: 14 (02/09/24 0819)  Height: 5' 2\" (157.5 cm) (02/08/24 1051)  Weight - Scale: 66.2 kg (146 lb) (02/08/24 1051)  SpO2: 94 % (02/09/24 0819)  Exam:   Physical Exam    Comfortably in bed  Neck supple  Lungs diminished breath sounds the bases  Vesicular breath sounds  Heart sounds S1-S2 noted  Abdomen soft  Colostomy noted  Abdomen nontender  Awake follows commands  No pedal  No rash      Discharge instructions/Information to patient and family:   See after visit summary for information provided to patient and family.      Discharge plan discussed with the patient  Called left a message for significant other Mateo over phone  Outpatient follow-up with urology, primary care physician    Provisions for Follow-Up Care:  See after visit summary for information related to follow-up care and any pertinent home health orders.      Planned Readmission: no     Discharge Statement:  I spent 42 minutes discharging the patient. This time was spent on the day of discharge. I had direct contact with the patient on the day of discharge. Greater than 50% of the total time was spent examining patient, answering all patient questions, arranging and discussing plan of care with patient as well as directly providing post-discharge instructions.  Additional time then spent on discharge activities.    Discharge Medications:  See after visit summary for reconciled discharge medications provided to patient and family.      ** Please Note: This note has been constructed using a voice recognition system **                "

## 2024-02-09 NOTE — ASSESSMENT & PLAN NOTE
Blood pressures reviewed, acceptable  Continue amlodipine  Recommend low-salt diet  Ambulatory blood pressure monitoring outpatient follow-up

## 2024-02-09 NOTE — ASSESSMENT & PLAN NOTE
"CT A/P: \"Slight interval increase in the size of several subcentimeter pulmonary nodules in the visualized lung bases. Metastasis cannot be excluded.\"  Outpatient follow-up recommended  "

## 2024-02-09 NOTE — INCIDENTAL FINDINGS
The following findings require follow up:  Radiographic finding   Finding: Slight interval increase in the size of several subcentimeter pulmonary nodules in the visualized lung bases.        Follow up required: Follow-up recommended     Follow up should be done within 2-3 week(s)    Please notify the following clinician to assist with the follow up:   Dr. Leena Anaya MD

## 2024-02-09 NOTE — PROGRESS NOTES
"Progress Note - Urology  Danielle Haque 1965, 58 y.o. female MRN: [id number]    Unit/Bed#: W -01 Encounter: [id number]    Assessment & Plan:  Left ureteral calculus  Right ureteral stricture w/ chronic right ureteral stent  -CT showing 3 mm distal left ureteral calculus causing moderate hydroureteronephrosis. Right-sided nephroureteral stent in place. There is moderate right hydronephrosis, similar to the prior study.   - POD #1 cystoscopy, left retrograde pyelogram, left URS with laser lithotripsy, basket stone extraction, dilation of left ureteral stricture, and left ureteral stent placement, and right ureteral stent exchange  - Plan to maintain bilateral ureteral stents with next surgical intervention planned for 3 months with right ureteral stent exchange and diagnostic left URS and laser lithotripsy of remaining stones. If ureter appears patent at that time could consider trial of stent removal. If concern for ongoing stricture disease, plan will be to maintain stents chronically.   - Follow up in 6 weeks as outpatient with imaging prior.   - Urology will sign off at this time. Cleared for discharge from urologic standpoint      Subjective:   HPI: No acute events overnight. Afebrile and VSS. She reports pain has significantly improved since procedure yesterday. Currently no complaints.     Review of Systems   Constitutional:  Negative for chills and fever.   Respiratory:  Negative for shortness of breath.    Cardiovascular:  Negative for chest pain.   Gastrointestinal:  Negative for abdominal pain, nausea and vomiting.   Genitourinary:  Negative for difficulty urinating, dysuria, flank pain, frequency, hematuria and urgency.   Neurological:  Negative for dizziness.       Objective:  Vitals: Blood pressure 134/67, pulse 81, temperature 97.7 °F (36.5 °C), resp. rate 14, height 5' 2\" (1.575 m), weight 66.2 kg (146 lb), SpO2 94%.,Body mass index is 26.7 kg/m².    Physical Exam  Constitutional:       " General: She is not in acute distress.     Appearance: Normal appearance. She is not ill-appearing or toxic-appearing.   HENT:      Head: Normocephalic and atraumatic.      Right Ear: External ear normal.      Left Ear: External ear normal.      Nose: Nose normal.   Eyes:      General: No scleral icterus.     Conjunctiva/sclera: Conjunctivae normal.   Cardiovascular:      Pulses: Normal pulses.   Pulmonary:      Effort: Pulmonary effort is normal.   Musculoskeletal:         General: Normal range of motion.      Cervical back: Normal range of motion.   Neurological:      General: No focal deficit present.      Mental Status: She is alert and oriented to person, place, and time.   Psychiatric:         Mood and Affect: Mood normal.         Behavior: Behavior normal.         Thought Content: Thought content normal.         Judgment: Judgment normal.         Labs:  Recent Labs     02/07/24 2047 02/08/24  0545 02/09/24  0535   WBC 7.04 4.26* 4.44       Recent Labs     02/07/24 2047 02/08/24  0545 02/09/24  0535   HGB 11.2* 9.6* 9.6*     Recent Labs     02/07/24 2047 02/08/24  0545 02/09/24  0535   HCT 35.6 31.0* 30.4*     Recent Labs     02/07/24 2047 02/08/24  0545   CREATININE 1.17 1.10     Urine Culture:   10,000-19,000 cfu/ml   Mixed Contaminants X2       History:    Past Medical History:   Diagnosis Date    Cancer (HCC)     Colon cancer (HCC)     Fall     Headache     Hemochromatosis, unspecified     History of transfusion     2022 - no adverse reaction    Hypertension     Kidney calculi     Kidney stone     Liver disease     hemangioma    Muscle weakness     Personal history of COVID-19 12/2022    Sarcoidosis     Seizures (HCC)     2022    Shingles      Social History     Socioeconomic History    Marital status:      Spouse name: None    Number of children: None    Years of education: None    Highest education level: None   Occupational History    None   Tobacco Use    Smoking status: Never     Passive  exposure: Past    Smokeless tobacco: Never   Vaping Use    Vaping status: Never Used   Substance and Sexual Activity    Alcohol use: Never    Drug use: Never    Sexual activity: Not Currently   Other Topics Concern    None   Social History Narrative    From 4/14/23  note:    Emergency Contact: MATEO TREVIZO (ex-) [phone] (Mobile)    Marital Status:     Caregiver/Support: ex- -Mateo and two dtrs.     Children: two dtrs- nataliia 21 in [place] and doretha 18 [facility]    Child/Elder care: no     Housing: two story house    Home Setup: one level    Lives With:  -mateo and two dtrs    Daily Living Activities: independent    Durable Medical Equipment: No    Ambulation: independent    Employment: disabled-SSI-$100 and pension-$700     Status/Location: no     Ability to pay bills: yes. No barriers to paying monthly bills.      POA/LW/AD: no.  - Mateo is healthcare representative.  MSW emailed advance directive.          Social Determinants of Health     Financial Resource Strain: Low Risk  (11/30/2022)    Received from [employer].    Financial Resource Strain     In the last 12 months, did you worry that your food could run out before you got money to buy more? : No   Food Insecurity: No Food Insecurity (8/18/2023)    Hunger Vital Sign     Worried About Running Out of Food in the Last Year: Never true     Ran Out of Food in the Last Year: Never true   Transportation Needs: No Transportation Needs (8/18/2023)    PRAPARE - Transportation     Lack of Transportation (Medical): No     Lack of Transportation (Non-Medical): No   Physical Activity: Not on file   Stress: Not on file   Social Connections: Not on file   Intimate Partner Violence: Low Risk  (11/30/2022)    Received from [employer].    Violence     Does anyone in your life hurt you, threaten you, frighten you or make you feel unsafe?: No   Housing Stability: Low Risk   (8/18/2023)    Housing Stability Vital Sign     Unable to Pay for Housing in the Last Year: No     Number of Places Lived in the Last Year: 1     Unstable Housing in the Last Year: No     Past Surgical History:   Procedure Laterality Date    ABSCESS DRAINAGE      left breast    BREAST BIOPSY      CHEST TUBE INSERTION      COLON SURGERY      colostomy    COLONOSCOPY      FL GUIDED CENTRAL VENOUS ACCESS DEVICE INSERTION  03/21/2023    FL RETROGRADE PYELOGRAM  01/23/2023    FL RETROGRADE PYELOGRAM  04/03/2023    FL RETROGRADE PYELOGRAM  7/21/2023    FL RETROGRADE PYELOGRAM  10/11/2023    FL RETROGRADE PYELOGRAM  12/15/2023    FL RETROGRADE PYELOGRAM  2/8/2024    IR BIOPSY LIVER MASS  05/09/2023    LUNG BIOPSY      MN CYSTO BLADDER W/URETERAL CATHETERIZATION Bilateral 07/21/2023    Procedure: CYSTOSCOPY URETEROSCOPY RETROGRADE PYELOGRAM WITH BILATERAL STENT URETERAL EXCHANGE; LEFT LASER LITHOTRIPSY AND STONE BASKET RETRIEVAL;  Surgeon: José Luis Stephens MD;  [facility];  Service: Urology    MN CYSTO BLADDER W/URETERAL CATHETERIZATION Bilateral 12/15/2023    Procedure: CYSTOSCOPY, BILATERAL  RETROGRADE PYELOGRAM , STENT EXCHANGE RIGHT , LEFT URETEROSCOPY ,  HOLMIUM LASER WITH INSERTION LEFT URETERAL STENT;  Surgeon: Derick Bhakta MD;  [facility];  Service: Urology    MN CYSTO BLADDER W/URETERAL CATHETERIZATION Left 2/8/2024    Procedure: CYSTOSCOPY B/L RETROGRADE PYELOGRAM WITH B/L T URETERALSTENT EXCHANGE,LEFT URETEROSCOPY, DILATION LEFT URETERAL STICTURE;  Surgeon: José Luis Stephens MD;  [facility];  Service: Urology    MN CYSTO/URETERO W/LITHOTRIPSY &INDWELL STENT INSRT Bilateral 01/23/2023    Procedure: CYSTOSCOPY  RIGHT URETEROSCOPY WITH LITHOTRIPSY HOLMIUM LASER, BILATERAL RETROGRADE PYELOGRAM AND BILATERAL  URETERAL STENT INSERTION;  Surgeon: José Luis Stephens MD;  [facility];  Service: Urology    MN CYSTO/URETERO W/LITHOTRIPSY &INDWELL STENT INSRT  Right 04/03/2023    Procedure: RIGHT URETEROSCOPY WITH LITHOTRIPSY HOLMIUM LASER, RETROGRADE PYELOGRAM; BILATERAL EXCHANGE STENT URETERAL;  Surgeon: José Luis Stephens MD;  [facility];  Service: Urology    VT CYSTO/URETERO W/LITHOTRIPSY &INDWELL STENT INSRT Bilateral 10/11/2023    Procedure: CYSTOSCOPY URETEROSCOPY RETROGRADE PYELOGRAM AND INSERTION STENT URETERAL DIAGNOSTINC RIGHT URETEROSCOPY, REMOVAL STENT LEFT SIDE;  Surgeon: José Luis Stephens MD;  [facility];  Service: Urology    VT INSJ TUNNELED CTR VAD W/SUBQ PORT AGE 5 YR/> N/A 03/21/2023    Procedure: INSERTION VENOUS PORT ( PORT-A-CATH) IR;  Surgeon: Mark Amos DO;  [facility];  Service: Interventional Radiology    VT LAPS COLECTOMY PRTL W/COLOPXTSTMY LW ANAST N/A 8/17/2023    Procedure: RESECTION COLON LOW ANTERIOR LAPAROSCOPIC WITH ROBOTICS;  Surgeon: Sree Umanzor MD;  [facility];  Service: Colorectal    TONSILLECTOMY      URINARY SURGERY Bilateral     bilateral stents     Family History   Problem Relation Age of Onset    Cancer Mother     Cirrhosis Father     Breast cancer Neg Hx     Breast cancer additional onset Neg Hx        Ana Romero PA-C  Date: 2/9/2024 Time: 10:27 AM

## 2024-02-10 PROCEDURE — 88300 SURGICAL PATH GROSS: CPT | Performed by: SPECIALIST

## 2024-02-12 ENCOUNTER — TRANSITIONAL CARE MANAGEMENT (OUTPATIENT)
Dept: INTERNAL MEDICINE CLINIC | Facility: CLINIC | Age: 59
End: 2024-02-12

## 2024-02-12 NOTE — TELEPHONE ENCOUNTER
Post Op Note Spoke with  not patient     Danielle Haque is a 58 y.o. female s/p CYSTOSCOPY B/L RETROGRADE PYELOGRAM WITH B/L T URETERALSTENT EXCHANGE,LEFT URETEROSCOPY, DILATION LEFT URETERAL STICTURE (Left: Ureter)  performed 2/8/24.  Danielle Haque is a patient of Dr. Dr. Stephens and is seen at the [place] office.     How would you rate your pain on a scale from 1 to 10, 10 being the worst pain ever? 4 intermittent pain. Is taking medications as prescribed  Have you had a fever? No  Do you have any difficulty urinating? No    Do you have any other questions or concerns that I can address at this time?   -Went over potential/expected post op symptoms   -discussed ER precautions  -Confirmed post op appts and locations   -Went over medication    -Confirmed plan as stated by Dr. Stephens.  confirmed plan at this time

## 2024-02-13 PROBLEM — K94.01 BLEEDING FROM COLOSTOMY STOMA (HCC): Status: RESOLVED | Noted: 2023-02-11 | Resolved: 2024-02-13

## 2024-02-13 PROBLEM — R31.0 GROSS HEMATURIA: Status: RESOLVED | Noted: 2023-02-15 | Resolved: 2024-02-13

## 2024-02-13 NOTE — PROGRESS NOTES
"Name: Danielle Haque      : 1965      MRN: [id number]  Encounter Provider: Leena Anaya MD  Encounter Date: 2024   Encounter department: [facility]    Assessment & Plan     1. Screening for cervical cancer    2. Pulmonary nodules  Assessment & Plan:  CT A/P: \"Slight interval increase in the size of several subcentimeter pulmonary nodules in the visualized lung bases. Metastasis cannot be excluded.\"     I communicated with her oncology Dr. Ferrera.   Plan to keep her CT scheduled in March. As per Dr. Ferrera discussed with IR nodules too small for biopsy.       3. Ureteral calculi with hydroureteronephrosis  Assessment & Plan:  CT: \"3 mm distal left ureteral calculus causing moderate hydroureteronephrosis. Right-sided nephroureteral stent in place. There is moderate right hydronephrosis, similar to the prior study. Nonobstructing bilateral renal calculi.\"     S/p cystoscopy with left ureteral stent placement, right ureteral stent exchange per urology     F/u with urology  On discharge med, added pyridium and flomax to be used as needed for symptoms      4. Adrenal nodule (HCC)  Assessment & Plan:  Repeat CT 2023 \"Stable 7 mm right adrenal gland nodule \"      5. Iron deficiency anemia, unspecified iron deficiency anemia type  Assessment & Plan:  Anemia  Ferritin dropped from >800 to <30  Recheck iron panel    Orders:  -     Iron Panel (Includes Ferritin, Iron Sat%, Iron, and TIBC); Future    6. Lump of skin  -     Ambulatory Referral to Dermatology; Future    7. Sarcoidosis  Assessment & Plan:  F/u GI  Noticed prednisone not on her discharge list  Will reach out to her GI Dr. Machuca to double check           TCM Call       Date and time call was made  2024 10:48 AM    Hospital care reviewed  Records not available    Patient was hospitial[facility] at  [facility]    Date of Admission  24    Date of discharge  24    Diagnosis  Ureteral calculi with " "hydroureteronephrosis    Disposition  Home    Were the patients medications reviewed and updated  No    Current Symptoms  Weakness    Weakness severity  Severe          TCM Call       Should patient be enrolled in anticoag monitoring?  No    Scheduled for follow up?  Yes    Did you obtain your prescribed medications  Yes    Do you need help managing your prescriptions or medications  No    Is transportation to your appointment needed  No    I have advised the patient to call PCP with any new or worsening symptoms  Naheed Murguia M.A    Living Arrangements  Friends    Are you recieving any outpatient services  No    Are you recieving home care services  No    Types of home care services  Nurse visit    Are you using any community resources  No    Have you fallen in the last 12 months  No    How many times  Once    Interperter language line needed  No            Subjective      HPI  TCM  2/7-2/9  CT: \"3 mm distal left ureteral calculus causing moderate hydroureteronephrosis. Right-sided nephroureteral stent in place. There is moderate right hydronephrosis, similar to the prior study. Nonobstructing bilateral renal calculi.\"     S/p cystoscopy with left ureteral stent placement, right ureteral stent exchange per urology     CT A/P: \"Slight interval increase in the size of several subcentimeter pulmonary nodules in the visualized lung bases. Metastasis cannot be excluded.\"     2 days ago woke up with severe pain across upper chest last for 30min, with SOB palpitation self resolved.     Noticed a lump behind left ear for several months. Not enlarging.   Review of Systems    Current Outpatient Medications on File Prior to Visit   Medication Sig    acetaminophen (TYLENOL) 325 mg tablet Take 2 tablets (650 mg total) by mouth every 4 (four) hours as needed for mild pain    acetaminophen (TYLENOL) 500 mg tablet Take 2 tablets (1,000 mg total) by mouth 3 (three) times a day    amLODIPine (NORVASC) 10 mg tablet Take 1 tablet " "(10 mg total) by mouth daily    cyanocobalamin (VITAMIN B-12) 1000 MCG tablet Take 1,000 mcg by mouth daily    docusate sodium (COLACE) 100 mg capsule Take 1 capsule (100 mg total) by mouth 2 (two) times a day for 15 days    fenofibrate (TRICOR) 145 mg tablet Take 1 tablet (145 mg total) by mouth daily    oxybutynin (DITROPAN) 5 mg tablet Take 1 tablet (5 mg total) by mouth every 6 (six) hours as needed (bladder spasms)    tamsulosin (FLOMAX) 0.4 mg Take 1 capsule (0.4 mg total) by mouth daily with dinner    ursodiol (ACTIGALL) 300 mg capsule Take 3 capsules (900 mg total) by mouth in the morning    potassium chloride (MICRO-K) 10 MEQ CR capsule Take 4 capsules (40 mEq total) by mouth 2 (two) times a day       Objective     /76 (BP Location: Left arm, Patient Position: Sitting, Cuff Size: Standard)   Ht 5' 2\" (1.575 m)   Wt 67.1 kg (148 lb)   BMI 27.07 kg/m²     Physical Exam  Leena Anaya MD    "

## 2024-02-13 NOTE — ASSESSMENT & PLAN NOTE
"CT A/P: \"Slight interval increase in the size of several subcentimeter pulmonary nodules in the visualized lung bases. Metastasis cannot be excluded.\"     I communicated with her oncology Dr. Ferrera.   Plan to keep her CT scheduled in March. As per Dr. Ferrera discussed with IR nodules too small for biopsy.   "

## 2024-02-13 NOTE — ASSESSMENT & PLAN NOTE
"CT: \"3 mm distal left ureteral calculus causing moderate hydroureteronephrosis. Right-sided nephroureteral stent in place. There is moderate right hydronephrosis, similar to the prior study. Nonobstructing bilateral renal calculi.\"     S/p cystoscopy with left ureteral stent placement, right ureteral stent exchange per urology     F/u with urology  On discharge med, added pyridium and flomax to be used as needed for symptoms  "

## 2024-02-14 ENCOUNTER — TELEPHONE (OUTPATIENT)
Dept: INTERNAL MEDICINE CLINIC | Facility: CLINIC | Age: 59
End: 2024-02-14

## 2024-02-14 ENCOUNTER — OFFICE VISIT (OUTPATIENT)
Dept: INTERNAL MEDICINE CLINIC | Facility: CLINIC | Age: 59
End: 2024-02-14
Payer: COMMERCIAL

## 2024-02-14 VITALS
DIASTOLIC BLOOD PRESSURE: 76 MMHG | BODY MASS INDEX: 27.23 KG/M2 | HEIGHT: 62 IN | SYSTOLIC BLOOD PRESSURE: 124 MMHG | WEIGHT: 148 LBS

## 2024-02-14 DIAGNOSIS — D50.9 IRON DEFICIENCY ANEMIA, UNSPECIFIED IRON DEFICIENCY ANEMIA TYPE: ICD-10-CM

## 2024-02-14 DIAGNOSIS — R22.9 LUMP OF SKIN: ICD-10-CM

## 2024-02-14 DIAGNOSIS — E27.8 ADRENAL NODULE (HCC): ICD-10-CM

## 2024-02-14 DIAGNOSIS — D86.9 SARCOIDOSIS: ICD-10-CM

## 2024-02-14 DIAGNOSIS — Z12.4 SCREENING FOR CERVICAL CANCER: Primary | ICD-10-CM

## 2024-02-14 DIAGNOSIS — N20.1 URETERAL CALCULI: ICD-10-CM

## 2024-02-14 DIAGNOSIS — R91.8 PULMONARY NODULES: ICD-10-CM

## 2024-02-14 PROBLEM — D69.6 THROMBOCYTOPENIA (HCC): Status: RESOLVED | Noted: 2023-02-15 | Resolved: 2024-02-14

## 2024-02-14 PROCEDURE — 99496 TRANSJ CARE MGMT HIGH F2F 7D: CPT | Performed by: GENERAL ACUTE CARE HOSPITAL

## 2024-02-14 NOTE — TELEPHONE ENCOUNTER
----- Message from José Luis Stephens MD sent at 2/14/2024 12:43 PM EST -----  Hello.    Purely optional only if she is having discomfort from the stents.    Thanks    Carlos      ----- Message -----  From: Leena Anaya MD  Sent: 2/14/2024  12:27 PM EST  To: José Luis Stephens MD    Hi Dr. Stephens,     I saw Danielle today in primary for post discharge follow up.     We noticed that she was discharged on flomax but she said she stopped it last year and has not been taking it.     Would like to double check with you if she should resume flomax for longterm use?    Thanks.     Leena

## 2024-02-14 NOTE — TELEPHONE ENCOUNTER
----- Message from Edouard Ferrera MD sent at 2/14/2024 12:37 PM EST -----  Hi -     Yes this is the plan. She was discussed at tumor board and it was felt this was too small for biopsy at present per IR.     Thanks,     Will    ----- Message -----  From: Leena Anaya MD  Sent: 2/14/2024  12:21 PM EST  To: Edouard Ferrera MD    Hi Dr. Ferrera,     I see Danielle today in primary care for post-discharge follow up.     We noticed she had CT in this hospitalization that showed interval enlargement of her lung nodules compared to 12/2023. As per previous radiology report it was suspicious for malignancy.     She has another CT scheduled in March. With the changes in this new CT, I want to check with you see if you recommend to keep that plan or consider biopsy sooner. I told Danielle I send you a msg regarding this.     Thank you.     Leena

## 2024-02-14 NOTE — ASSESSMENT & PLAN NOTE
F/u GI  Noticed prednisone not on her discharge list  Will reach out to her GI Dr. Machuca to double check

## 2024-02-16 ENCOUNTER — TELEPHONE (OUTPATIENT)
Dept: GASTROENTEROLOGY | Facility: CLINIC | Age: 59
End: 2024-02-16

## 2024-02-16 DIAGNOSIS — N39.0 ACUTE UTI: Primary | ICD-10-CM

## 2024-02-16 LAB
COLOR STONE: NORMAL
COM MFR STONE: 50 %
COMMENT-STONE3: NORMAL
COMPOSITION: NORMAL
HYDROXYAPATITE 24H ENGDIFF UR: 50 %
LABORATORY COMMENT REPORT: NORMAL
PHOTO: NORMAL
SIZE STONE: NORMAL MM
SPEC SOURCE SUBJ: NORMAL
STONE ANALYSIS-IMP: NORMAL
STONE ANALYSIS-IMP: NORMAL
WT STONE: 2 MG

## 2024-02-16 NOTE — TELEPHONE ENCOUNTER
Spoke with Mateo, mateo confirmed that pt is taking alternating prednisone dosages. Every other day she is taking 10 mg and the other days taking 15. Advised mateo that pt is to be taking 15 mg daily and to obtain bw at earliest convenience to determine weaning eligibility.      Mateo agreeable and verbalized understanding of information

## 2024-02-16 NOTE — TELEPHONE ENCOUNTER
----- Message from Akua Machuca MD sent at 2/15/2024 10:43 PM EST -----  Raza Stern,    Yes, she should still be on prednisone 15 mg daily. She is due for labs this week to see if I can wean her steroids.     Samira/Kylie, can you please contact the patient to let her know to repeat her labs?    Thanks!  Akua  ----- Message -----  From: Leena Anaya MD  Sent: 2/14/2024  12:23 PM EST  To: MD Raza Khoury,     I see Danielle today in primary care for post-discharge follow up.   I noticed that prednisone is not on her discharge med list.   She says she is supposed to continue it but not sure.   I want to confirm with you if it's correct that she should continue prednisone 15mg daily.     She also asks when should she get your labs done. I said to do it before her next visit you in April. If you want to get it done sooner pls let me know.     Thanks.     Leena

## 2024-02-19 ENCOUNTER — APPOINTMENT (OUTPATIENT)
Dept: LAB | Facility: AMBULARY SURGERY CENTER | Age: 59
End: 2024-02-19
Payer: COMMERCIAL

## 2024-02-19 DIAGNOSIS — N39.0 ACUTE UTI: ICD-10-CM

## 2024-02-19 LAB
BACTERIA UR QL AUTO: ABNORMAL /HPF
BILIRUB UR QL STRIP: ABNORMAL
CAOX CRY URNS QL MICRO: ABNORMAL /HPF
CLARITY UR: ABNORMAL
COLOR UR: ABNORMAL
GLUCOSE UR STRIP-MCNC: ABNORMAL MG/DL
HGB UR QL STRIP.AUTO: ABNORMAL
KETONES UR STRIP-MCNC: NEGATIVE MG/DL
LEUKOCYTE ESTERASE UR QL STRIP: ABNORMAL
NITRITE UR QL STRIP: POSITIVE
NON-SQ EPI CELLS URNS QL MICRO: ABNORMAL /HPF
PH UR STRIP.AUTO: 6.5 [PH]
PROT UR STRIP-MCNC: ABNORMAL MG/DL
RBC #/AREA URNS AUTO: ABNORMAL /HPF
SP GR UR STRIP.AUTO: 1.02 (ref 1–1.03)
TRANS CELLS #/AREA URNS HPF: PRESENT /[HPF]
UROBILINOGEN UR STRIP-ACNC: 3 MG/DL
WBC #/AREA URNS AUTO: ABNORMAL /HPF

## 2024-02-19 PROCEDURE — 81001 URINALYSIS AUTO W/SCOPE: CPT

## 2024-02-19 PROCEDURE — 87086 URINE CULTURE/COLONY COUNT: CPT

## 2024-02-20 LAB — BACTERIA UR CULT: NORMAL

## 2024-02-21 ENCOUNTER — TELEPHONE (OUTPATIENT)
Dept: GASTROENTEROLOGY | Facility: CLINIC | Age: 59
End: 2024-02-21

## 2024-02-21 DIAGNOSIS — D86.89 HEPATIC GRANULOMA ASSOCIATED WITH SARCOIDOSIS: Primary | ICD-10-CM

## 2024-02-21 PROBLEM — N30.01 ACUTE CYSTITIS WITH HEMATURIA: Status: RESOLVED | Noted: 2023-01-06 | Resolved: 2024-02-21

## 2024-02-21 NOTE — TELEPHONE ENCOUNTER
----- Message from Akua Machuca MD sent at 2/20/2024 10:52 PM EST -----  Hi, can we please contact the patient to decrease her prednisone to 10 mg daily and repeat in 2 weeks? Thanks!

## 2024-02-21 NOTE — TELEPHONE ENCOUNTER
Spoke with Mateo (pt's EC). Relayed recommendations as per Dr Machuca. He verbalized agreement/understanding and will relay message to patient

## 2024-02-21 NOTE — UTILIZATION REVIEW
NOTIFICATION OF ADMISSION DISCHARGE   This is a Notification of Discharge from [facility]. Please be advised that this patient has been discharge from our facility. Below you will find the admission and discharge date and time including the patient’s disposition.   UTILIZATION REVIEW CONTACT:  Primo Beal  Utilization   Network Utilization Review Department  Phone: [phone] x carefully listen to the prompts. All voicemails are confidential.  Email: [email or web address]     ADMISSION INFORMATION  PRESENTATION DATE: 2/7/2024  5:26 PM  OBERVATION ADMISSION DATE:   INPATIENT ADMISSION DATE: 2/7/24 10:49 PM   DISCHARGE DATE: 2/9/2024  2:39 PM   DISPOSITION:Home/Self Care    Network Utilization Review Department  ATTENTION: Please call with any questions or concerns to [phone] and carefully listen to the prompts so that you are directed to the right person. All voicemails are confidential.   For Discharge needs, contact Care Management DC Support Team at [phone] opt. [phone]  Send all requests for admission clinical reviews, approved or denied determinations and any other requests to dedicated fax number below belonging to the campus where the patient is receiving treatment. List of dedicated fax numbers for the Facilities:  FACILITY NAME UR FAX NUMBER   ADMISSION DENIALS (Administrative/Medical Necessity) [phone]   DISCHARGE SUPPORT TEAM ([facility]) [phone]   PARENT CHILD HEALTH (Maternity/NICU/Pediatrics) [phone]   [facility] [phone]   [facility] [phone]   [facility] [phone]   [facility] [phone]   [facility] [phone]   [facility] [phone]   [facility] [phone]   [facility] [phone]   [facility]  [facility] [phone]   [facility] [phone]   [facility] [phone]   [facility] [phone]

## 2024-03-01 ENCOUNTER — HOSPITAL ENCOUNTER (OUTPATIENT)
Facility: HOSPITAL | Age: 59
Setting detail: OBSERVATION
Discharge: HOME | End: 2024-03-02
Attending: EMERGENCY MEDICINE | Admitting: INTERNAL MEDICINE
Payer: COMMERCIAL

## 2024-03-01 DIAGNOSIS — N17.9 AKI (ACUTE KIDNEY INJURY) (HCC): ICD-10-CM

## 2024-03-01 DIAGNOSIS — N23 RENAL COLIC ON RIGHT SIDE: Primary | ICD-10-CM

## 2024-03-01 DIAGNOSIS — N20.1 URETERAL CALCULI: ICD-10-CM

## 2024-03-01 DIAGNOSIS — N13.5 URETERAL STRICTURE: ICD-10-CM

## 2024-03-01 DIAGNOSIS — N13.30 HYDRONEPHROSIS: ICD-10-CM

## 2024-03-01 DIAGNOSIS — N39.0 UTI (URINARY TRACT INFECTION): ICD-10-CM

## 2024-03-01 DIAGNOSIS — R11.0 NAUSEA: ICD-10-CM

## 2024-03-01 PROCEDURE — 99284 EMERGENCY DEPT VISIT MOD MDM: CPT

## 2024-03-01 NOTE — LETTER
March 2, 2024     Leena Anaya MD  [facility] [facility] [address]    Patient: Danielle Haque   YOB: 1965   Date of Visit: 3/1/2024     Thank you for allowing us to participate in the care of your patient, Danielle Haque, who was hospitalized from 3/1/2024 through 3/2/2024 with the admitting diagnosis of ureteric colic in a patient with ureteral strictures and bilateral ureteral stents.  Symptoms resolved with IV hydration and was reviewed by urology who felt that the stents were in appropriate position and no intervention was indicated.  She has been cleared for discharge and will follow-up with Dr. Stephens as outpatient.    Medication Changes:  None    Outpatient testing recommended:  None    If you have any additional questions or would like to discuss further, please feel free to contact me.    Ana Cristina Mckeon MD  [facility] Internal Medicine, Hospitalist  [phone]

## 2024-03-02 ENCOUNTER — APPOINTMENT (EMERGENCY)
Dept: CT IMAGING | Facility: HOSPITAL | Age: 59
End: 2024-03-02
Payer: COMMERCIAL

## 2024-03-02 VITALS
TEMPERATURE: 98 F | OXYGEN SATURATION: 95 % | DIASTOLIC BLOOD PRESSURE: 78 MMHG | RESPIRATION RATE: 16 BRPM | SYSTOLIC BLOOD PRESSURE: 131 MMHG | HEART RATE: 80 BPM

## 2024-03-02 PROBLEM — N30.01 ACUTE CYSTITIS WITH HEMATURIA: Status: RESOLVED | Noted: 2023-01-06 | Resolved: 2024-03-02

## 2024-03-02 PROBLEM — N17.9 AKI (ACUTE KIDNEY INJURY) (HCC): Status: ACTIVE | Noted: 2024-03-02

## 2024-03-02 PROBLEM — D50.9 IRON DEFICIENCY ANEMIA: Status: RESOLVED | Noted: 2024-02-14 | Resolved: 2024-03-02

## 2024-03-02 LAB
ALBUMIN SERPL BCP-MCNC: 3.3 G/DL (ref 3.5–5)
ALBUMIN SERPL BCP-MCNC: 4 G/DL (ref 3.5–5)
ALP SERPL-CCNC: 152 U/L (ref 34–104)
ALP SERPL-CCNC: 188 U/L (ref 34–104)
ALT SERPL W P-5'-P-CCNC: 45 U/L (ref 7–52)
ALT SERPL W P-5'-P-CCNC: 57 U/L (ref 7–52)
ANION GAP SERPL CALCULATED.3IONS-SCNC: 7 MMOL/L
ANION GAP SERPL CALCULATED.3IONS-SCNC: 7 MMOL/L
APTT PPP: 27 SECONDS (ref 23–37)
AST SERPL W P-5'-P-CCNC: 26 U/L (ref 13–39)
AST SERPL W P-5'-P-CCNC: 32 U/L (ref 13–39)
BACTERIA UR QL AUTO: ABNORMAL /HPF
BASOPHILS # BLD AUTO: 0.01 THOUSANDS/ÂΜL (ref 0–0.1)
BASOPHILS NFR BLD AUTO: 0 % (ref 0–1)
BILIRUB SERPL-MCNC: 0.55 MG/DL (ref 0.2–1)
BILIRUB SERPL-MCNC: 0.65 MG/DL (ref 0.2–1)
BILIRUB UR QL STRIP: NEGATIVE
BUDDING YEAST: PRESENT
BUN SERPL-MCNC: 44 MG/DL (ref 5–25)
BUN SERPL-MCNC: 48 MG/DL (ref 5–25)
CALCIUM ALBUM COR SERPL-MCNC: 9.2 MG/DL (ref 8.3–10.1)
CALCIUM SERPL-MCNC: 10 MG/DL (ref 8.4–10.2)
CALCIUM SERPL-MCNC: 8.6 MG/DL (ref 8.4–10.2)
CHLORIDE SERPL-SCNC: 105 MMOL/L (ref 96–108)
CHLORIDE SERPL-SCNC: 111 MMOL/L (ref 96–108)
CK SERPL-CCNC: 34 U/L (ref 26–192)
CLARITY UR: ABNORMAL
CO2 SERPL-SCNC: 21 MMOL/L (ref 21–32)
CO2 SERPL-SCNC: 25 MMOL/L (ref 21–32)
COLOR UR: ABNORMAL
CREAT SERPL-MCNC: 1.56 MG/DL (ref 0.6–1.3)
CREAT SERPL-MCNC: 1.75 MG/DL (ref 0.6–1.3)
EOSINOPHIL # BLD AUTO: 0.05 THOUSAND/ÂΜL (ref 0–0.61)
EOSINOPHIL NFR BLD AUTO: 1 % (ref 0–6)
ERYTHROCYTE [DISTWIDTH] IN BLOOD BY AUTOMATED COUNT: 15.9 % (ref 11.6–15.1)
ERYTHROCYTE [DISTWIDTH] IN BLOOD BY AUTOMATED COUNT: 15.9 % (ref 11.6–15.1)
GFR SERPL CREATININE-BSD FRML MDRD: 32 ML/MIN/1.73SQ M
GFR SERPL CREATININE-BSD FRML MDRD: 36 ML/MIN/1.73SQ M
GLUCOSE SERPL-MCNC: 129 MG/DL (ref 65–140)
GLUCOSE SERPL-MCNC: 140 MG/DL (ref 65–140)
GLUCOSE UR STRIP-MCNC: NEGATIVE MG/DL
HCT VFR BLD AUTO: 30.3 % (ref 34.8–46.1)
HCT VFR BLD AUTO: 34.1 % (ref 34.8–46.1)
HGB BLD-MCNC: 10.8 G/DL (ref 11.5–15.4)
HGB BLD-MCNC: 9.6 G/DL (ref 11.5–15.4)
HGB UR QL STRIP.AUTO: ABNORMAL
IMM GRANULOCYTES # BLD AUTO: 0.03 THOUSAND/UL (ref 0–0.2)
IMM GRANULOCYTES NFR BLD AUTO: 0 % (ref 0–2)
INR PPP: 1.03 (ref 0.84–1.19)
KETONES UR STRIP-MCNC: NEGATIVE MG/DL
LACTATE SERPL-SCNC: 0.8 MMOL/L (ref 0.5–2)
LEUKOCYTE ESTERASE UR QL STRIP: ABNORMAL
LIPASE SERPL-CCNC: 28 U/L (ref 11–82)
LYMPHOCYTES # BLD AUTO: 0.41 THOUSANDS/ÂΜL (ref 0.6–4.47)
LYMPHOCYTES NFR BLD AUTO: 4 % (ref 14–44)
MCH RBC QN AUTO: 26.3 PG (ref 26.8–34.3)
MCH RBC QN AUTO: 26.5 PG (ref 26.8–34.3)
MCHC RBC AUTO-ENTMCNC: 31.7 G/DL (ref 31.4–37.4)
MCHC RBC AUTO-ENTMCNC: 31.7 G/DL (ref 31.4–37.4)
MCV RBC AUTO: 83 FL (ref 82–98)
MCV RBC AUTO: 84 FL (ref 82–98)
MONOCYTES # BLD AUTO: 0.6 THOUSAND/ÂΜL (ref 0.17–1.22)
MONOCYTES NFR BLD AUTO: 6 % (ref 4–12)
MUCOUS THREADS UR QL AUTO: ABNORMAL
NEUTROPHILS # BLD AUTO: 8.31 THOUSANDS/ÂΜL (ref 1.85–7.62)
NEUTS SEG NFR BLD AUTO: 89 % (ref 43–75)
NITRITE UR QL STRIP: NEGATIVE
NON-SQ EPI CELLS URNS QL MICRO: ABNORMAL /HPF
NRBC BLD AUTO-RTO: 0 /100 WBCS
PH UR STRIP.AUTO: 5.5 [PH]
PLATELET # BLD AUTO: 178 THOUSANDS/UL (ref 149–390)
PLATELET # BLD AUTO: 216 THOUSANDS/UL (ref 149–390)
PMV BLD AUTO: 8.8 FL (ref 8.9–12.7)
PMV BLD AUTO: 8.9 FL (ref 8.9–12.7)
POTASSIUM SERPL-SCNC: 3.6 MMOL/L (ref 3.5–5.3)
POTASSIUM SERPL-SCNC: 4.5 MMOL/L (ref 3.5–5.3)
PROT SERPL-MCNC: 5.6 G/DL (ref 6.4–8.4)
PROT SERPL-MCNC: 6.8 G/DL (ref 6.4–8.4)
PROT UR STRIP-MCNC: ABNORMAL MG/DL
PROTHROMBIN TIME: 14.1 SECONDS (ref 11.6–14.5)
RBC # BLD AUTO: 3.62 MILLION/UL (ref 3.81–5.12)
RBC # BLD AUTO: 4.1 MILLION/UL (ref 3.81–5.12)
RBC #/AREA URNS AUTO: ABNORMAL /HPF
SODIUM SERPL-SCNC: 137 MMOL/L (ref 135–147)
SODIUM SERPL-SCNC: 139 MMOL/L (ref 135–147)
SP GR UR STRIP.AUTO: 1.01 (ref 1–1.03)
UROBILINOGEN UR STRIP-ACNC: <2 MG/DL
WBC # BLD AUTO: 7.7 THOUSAND/UL (ref 4.31–10.16)
WBC # BLD AUTO: 9.41 THOUSAND/UL (ref 4.31–10.16)
WBC #/AREA URNS AUTO: ABNORMAL /HPF

## 2024-03-02 PROCEDURE — 83690 ASSAY OF LIPASE: CPT | Performed by: EMERGENCY MEDICINE

## 2024-03-02 PROCEDURE — 85027 COMPLETE CBC AUTOMATED: CPT

## 2024-03-02 PROCEDURE — 83605 ASSAY OF LACTIC ACID: CPT | Performed by: EMERGENCY MEDICINE

## 2024-03-02 PROCEDURE — 99214 OFFICE O/P EST MOD 30 MIN: CPT | Performed by: UROLOGY

## 2024-03-02 PROCEDURE — 85025 COMPLETE CBC W/AUTO DIFF WBC: CPT | Performed by: EMERGENCY MEDICINE

## 2024-03-02 PROCEDURE — 87040 BLOOD CULTURE FOR BACTERIA: CPT | Performed by: EMERGENCY MEDICINE

## 2024-03-02 PROCEDURE — 82550 ASSAY OF CK (CPK): CPT | Performed by: EMERGENCY MEDICINE

## 2024-03-02 PROCEDURE — 85610 PROTHROMBIN TIME: CPT | Performed by: EMERGENCY MEDICINE

## 2024-03-02 PROCEDURE — 96361 HYDRATE IV INFUSION ADD-ON: CPT

## 2024-03-02 PROCEDURE — 99285 EMERGENCY DEPT VISIT HI MDM: CPT | Performed by: EMERGENCY MEDICINE

## 2024-03-02 PROCEDURE — 36415 COLL VENOUS BLD VENIPUNCTURE: CPT | Performed by: EMERGENCY MEDICINE

## 2024-03-02 PROCEDURE — 96374 THER/PROPH/DIAG INJ IV PUSH: CPT

## 2024-03-02 PROCEDURE — 96375 TX/PRO/DX INJ NEW DRUG ADDON: CPT

## 2024-03-02 PROCEDURE — 85730 THROMBOPLASTIN TIME PARTIAL: CPT | Performed by: EMERGENCY MEDICINE

## 2024-03-02 PROCEDURE — 87086 URINE CULTURE/COLONY COUNT: CPT

## 2024-03-02 PROCEDURE — 74176 CT ABD & PELVIS W/O CONTRAST: CPT

## 2024-03-02 PROCEDURE — 99223 1ST HOSP IP/OBS HIGH 75: CPT

## 2024-03-02 PROCEDURE — 80053 COMPREHEN METABOLIC PANEL: CPT

## 2024-03-02 PROCEDURE — 80053 COMPREHEN METABOLIC PANEL: CPT | Performed by: EMERGENCY MEDICINE

## 2024-03-02 PROCEDURE — 99239 HOSP IP/OBS DSCHRG MGMT >30: CPT | Performed by: HOSPITALIST

## 2024-03-02 PROCEDURE — 81001 URINALYSIS AUTO W/SCOPE: CPT

## 2024-03-02 RX ORDER — HYDROMORPHONE HCL/PF 1 MG/ML
0.5 SYRINGE (ML) INJECTION EVERY 4 HOURS PRN
Status: DISCONTINUED | OUTPATIENT
Start: 2024-03-02 | End: 2024-03-02 | Stop reason: HOSPADM

## 2024-03-02 RX ORDER — HYDROMORPHONE HCL/PF 1 MG/ML
1 SYRINGE (ML) INJECTION ONCE
Status: COMPLETED | OUTPATIENT
Start: 2024-03-02 | End: 2024-03-02

## 2024-03-02 RX ORDER — AMLODIPINE BESYLATE 5 MG/1
10 TABLET ORAL DAILY
Status: DISCONTINUED | OUTPATIENT
Start: 2024-03-02 | End: 2024-03-02 | Stop reason: HOSPADM

## 2024-03-02 RX ORDER — ONDANSETRON 2 MG/ML
4 INJECTION INTRAMUSCULAR; INTRAVENOUS ONCE
Status: COMPLETED | OUTPATIENT
Start: 2024-03-02 | End: 2024-03-02

## 2024-03-02 RX ORDER — SODIUM CHLORIDE 9 MG/ML
100 INJECTION, SOLUTION INTRAVENOUS CONTINUOUS
Status: DISCONTINUED | OUTPATIENT
Start: 2024-03-02 | End: 2024-03-02 | Stop reason: HOSPADM

## 2024-03-02 RX ORDER — FENOFIBRATE 145 MG/1
145 TABLET, COATED ORAL DAILY
Status: DISCONTINUED | OUTPATIENT
Start: 2024-03-02 | End: 2024-03-02 | Stop reason: HOSPADM

## 2024-03-02 RX ORDER — OXYBUTYNIN CHLORIDE 5 MG/1
5 TABLET ORAL EVERY 6 HOURS PRN
Status: DISCONTINUED | OUTPATIENT
Start: 2024-03-02 | End: 2024-03-02 | Stop reason: HOSPADM

## 2024-03-02 RX ORDER — ACETAMINOPHEN 325 MG/1
650 TABLET ORAL EVERY 4 HOURS PRN
Status: DISCONTINUED | OUTPATIENT
Start: 2024-03-02 | End: 2024-03-02 | Stop reason: HOSPADM

## 2024-03-02 RX ORDER — CEFAZOLIN SODIUM 1 G/50ML
1000 SOLUTION INTRAVENOUS ONCE
OUTPATIENT
Start: 2024-03-02 | End: 2024-03-02

## 2024-03-02 RX ORDER — ONDANSETRON 2 MG/ML
4 INJECTION INTRAMUSCULAR; INTRAVENOUS EVERY 6 HOURS PRN
Status: DISCONTINUED | OUTPATIENT
Start: 2024-03-02 | End: 2024-03-02 | Stop reason: HOSPADM

## 2024-03-02 RX ORDER — DOCUSATE SODIUM 100 MG/1
100 CAPSULE, LIQUID FILLED ORAL 2 TIMES DAILY
Status: DISCONTINUED | OUTPATIENT
Start: 2024-03-02 | End: 2024-03-02

## 2024-03-02 RX ORDER — HYDROMORPHONE HCL/PF 1 MG/ML
0.5 SYRINGE (ML) INJECTION ONCE
Status: COMPLETED | OUTPATIENT
Start: 2024-03-02 | End: 2024-03-02

## 2024-03-02 RX ORDER — SODIUM CHLORIDE 9 MG/ML
150 INJECTION, SOLUTION INTRAVENOUS CONTINUOUS
Status: DISCONTINUED | OUTPATIENT
Start: 2024-03-02 | End: 2024-03-02

## 2024-03-02 RX ADMIN — HYDROMORPHONE HYDROCHLORIDE 0.5 MG: 1 INJECTION, SOLUTION INTRAMUSCULAR; INTRAVENOUS; SUBCUTANEOUS at 00:54

## 2024-03-02 RX ADMIN — ONDANSETRON 4 MG: 2 INJECTION INTRAMUSCULAR; INTRAVENOUS at 00:54

## 2024-03-02 RX ADMIN — DEXTROSE 1000 MG: 50 INJECTION, SOLUTION INTRAVENOUS at 04:35

## 2024-03-02 RX ADMIN — SODIUM CHLORIDE 1000 ML: 0.9 INJECTION, SOLUTION INTRAVENOUS at 00:53

## 2024-03-02 RX ADMIN — SODIUM CHLORIDE 150 ML/HR: 0.9 INJECTION, SOLUTION INTRAVENOUS at 02:28

## 2024-03-02 RX ADMIN — HYDROMORPHONE HYDROCHLORIDE 1 MG: 1 INJECTION, SOLUTION INTRAMUSCULAR; INTRAVENOUS; SUBCUTANEOUS at 02:25

## 2024-03-02 RX ADMIN — SODIUM CHLORIDE 75 ML/HR: 0.9 INJECTION, SOLUTION INTRAVENOUS at 03:09

## 2024-03-02 RX ADMIN — ACETAMINOPHEN 650 MG: 325 TABLET, FILM COATED ORAL at 04:05

## 2024-03-02 NOTE — ASSESSMENT & PLAN NOTE
Lab Results   Component Value Date    HGB 10.8 (L) 03/02/2024    HGB 11.8 02/19/2024    HGB 9.6 (L) 02/09/2024    HGB 9.6 (L) 02/08/2024   Continue to monitor CBC given plans for OR tomorrow

## 2024-03-02 NOTE — ED NOTES
Pt sitting in room waiting for ride home. Pt states she does not want to wait in the WR.      Samira Cotton RN  03/02/24 [id number]

## 2024-03-02 NOTE — CONSULTS
UROLOGY CONSULTATION NOTE     Patient Identifiers: Danielle Haque (MRN [id number])  Service Requesting Consultation: Emergency department  Service Providing Consultation:  Urology, José Luis Stephens MD    Date of Service: 3/2/2024    Reason for Consultation: Renal colic      ASSESSMENT:     1) complex nephrolithiasis  2) complex ureteral stricture disease  3 advanced rectal cancer    Patient presents with acute right renal colic and abdominal pain which resolved following analgesic straighten and hydration emergency department    At the time of my evaluation this morning at 845 she is asymptomatic, reports no pain, and is requesting hospital discharge.    CT scan which shows stents in appropriate positioning.  The encrustation reported by radiology in the right proximal send is actually secondary to her known ureteral stricture disease.  Stents are in appropriate position.  There is some mild increase in her hydronephrosis but no perinephric stranding and her pain is actually in her lower abdomen necessarily in her right flank.    All told I see no indications to exchange her ureteral stents acutely.  She has close follow-up with me and is requesting hospital discharge and I believe she is medically appropriate for this.    PLAN:     -No indications for surgical intervention the present time  - Patient will follow with me as an elective outpatient  -I have ordered her diet and coordinated with internal medicine for hospital discharge.    Thank you for allowing me to participate in this patients’ care.  Please do not hesitate to call with any additional questions.  José Luis Stephens MD    History of Present Illness:     Danielle Haque is a 58 y.o. old with a history of complex urologic history known to me for a number of years.  She presents with acute renal colic and has an updated CT to review.    Past Medical, Past Surgical History:     Past Medical History:   Diagnosis Date    Bleeding from colostomy stoma  (HCC)     Cancer (HCC)     Colon cancer (HCC)     Fall     Headache     Hemochromatosis, unspecified     History of transfusion     2022 - no adverse reaction    Hypertension     Kidney calculi     Kidney stone     Liver disease     hemangioma    Muscle weakness     Personal history of COVID-19 12/2022    Sarcoidosis     Seizures (HCC)     2022    Shingles    :    Past Surgical History:   Procedure Laterality Date    ABSCESS DRAINAGE      left breast    BREAST BIOPSY      CHEST TUBE INSERTION      COLON SURGERY      colostomy    COLONOSCOPY      FL GUIDED CENTRAL VENOUS ACCESS DEVICE INSERTION  03/21/2023    FL RETROGRADE PYELOGRAM  01/23/2023    FL RETROGRADE PYELOGRAM  04/03/2023    FL RETROGRADE PYELOGRAM  7/21/2023    FL RETROGRADE PYELOGRAM  10/11/2023    FL RETROGRADE PYELOGRAM  12/15/2023    FL RETROGRADE PYELOGRAM  2/8/2024    IR BIOPSY LIVER MASS  05/09/2023    LUNG BIOPSY      AL CYSTO BLADDER W/URETERAL CATHETERIZATION Bilateral 07/21/2023    Procedure: CYSTOSCOPY URETEROSCOPY RETROGRADE PYELOGRAM WITH BILATERAL STENT URETERAL EXCHANGE; LEFT LASER LITHOTRIPSY AND STONE BASKET RETRIEVAL;  Surgeon: José Luis Stephens MD;  [facility];  Service: Urology    AL CYSTO BLADDER W/URETERAL CATHETERIZATION Bilateral 12/15/2023    Procedure: CYSTOSCOPY, BILATERAL  RETROGRADE PYELOGRAM , STENT EXCHANGE RIGHT , LEFT URETEROSCOPY ,  HOLMIUM LASER WITH INSERTION LEFT URETERAL STENT;  Surgeon: Derick Bhakta MD;  [facility];  Service: Urology    AL CYSTO BLADDER W/URETERAL CATHETERIZATION Left 2/8/2024    Procedure: CYSTOSCOPY B/L RETROGRADE PYELOGRAM WITH B/L T URETERALSTENT EXCHANGE,LEFT URETEROSCOPY, DILATION LEFT URETERAL STICTURE;  Surgeon: José Luis Stephens MD;  [facility];  Service: Urology    AL CYSTO/URETERO W/LITHOTRIPSY &INDWELL STENT INSRT Bilateral 01/23/2023    Procedure: CYSTOSCOPY  RIGHT URETEROSCOPY WITH LITHOTRIPSY HOLMIUM LASER, BILATERAL RETROGRADE  PYELOGRAM AND BILATERAL  URETERAL STENT INSERTION;  Surgeon: José Luis Stephens MD;  [facility];  Service: Urology    MT CYSTO/URETERO W/LITHOTRIPSY &INDWELL STENT INSRT Right 04/03/2023    Procedure: RIGHT URETEROSCOPY WITH LITHOTRIPSY HOLMIUM LASER, RETROGRADE PYELOGRAM; BILATERAL EXCHANGE STENT URETERAL;  Surgeon: José Luis Stephens MD;  [facility];  Service: Urology    MT CYSTO/URETERO W/LITHOTRIPSY &INDWELL STENT INSRT Bilateral 10/11/2023    Procedure: CYSTOSCOPY URETEROSCOPY RETROGRADE PYELOGRAM AND INSERTION STENT URETERAL DIAGNOSTINC RIGHT URETEROSCOPY, REMOVAL STENT LEFT SIDE;  Surgeon: José Luis Stephens MD;  [facility];  Service: Urology    MT INSJ TUNNELED CTR VAD W/SUBQ PORT AGE 5 YR/> N/A 03/21/2023    Procedure: INSERTION VENOUS PORT ( PORT-A-CATH) IR;  Surgeon: Mark Amos DO;  Location: [facility];  Service: Interventional Radiology    MT LAPS COLECTOMY PRTL W/COLOPXTSTMY LW ANAST N/A 8/17/2023    Procedure: RESECTION COLON LOW ANTERIOR LAPAROSCOPIC WITH ROBOTICS;  Surgeon: Sree Umanzor MD;  [facility];  Service: Colorectal    TONSILLECTOMY      URINARY SURGERY Bilateral     bilateral stents   :    Medications, Allergies:     Current Facility-Administered Medications:     acetaminophen (TYLENOL) tablet 650 mg, 650 mg, Oral, Q4H PRN, Jill Prado PA-C, 650 mg at 03/02/24 0405    amLODIPine (NORVASC) tablet 10 mg, 10 mg, Oral, Daily, Jill Prado PA-C    [START ON 3/3/2024] ceftriaxone (ROCEPHIN) 1 g/50 mL in dextrose IVPB, 1,000 mg, Intravenous, Q24H, Jill Prado PA-C    fenofibrate (TRICOR) tablet 145 mg, 145 mg, Oral, Daily, Jill Prado PA-C    HYDROmorphone (DILAUDID) injection 0.5 mg, 0.5 mg, Intravenous, Q4H PRN, Jill Prado PA-C    ondansetron (ZOFRAN) injection 4 mg, 4 mg, Intravenous, Q6H PRN, Jill Prado PA-C    oxybutynin (DITROPAN) tablet 5 mg, 5 mg, Oral, Q6H PRN, Jill Prado,  "JONAS    sodium chloride 0.9 % infusion, 100 mL/hr, Intravenous, Continuous, Jill Prado PA-C, Last Rate: 100 mL/hr at 03/02/24 0327, 100 mL/hr at 03/02/24 0327    Current Outpatient Medications:     acetaminophen (TYLENOL) 325 mg tablet, Take 2 tablets (650 mg total) by mouth every 4 (four) hours as needed for mild pain, Disp: , Rfl:     acetaminophen (TYLENOL) 500 mg tablet, Take 2 tablets (1,000 mg total) by mouth 3 (three) times a day, Disp: 180 tablet, Rfl: 11    amLODIPine (NORVASC) 10 mg tablet, Take 1 tablet (10 mg total) by mouth daily, Disp: 90 tablet, Rfl: 3    cyanocobalamin (VITAMIN B-12) 1000 MCG tablet, Take 1,000 mcg by mouth daily, Disp: , Rfl:     docusate sodium (COLACE) 100 mg capsule, Take 1 capsule (100 mg total) by mouth 2 (two) times a day for 15 days, Disp: 30 capsule, Rfl: 0    fenofibrate (TRICOR) 145 mg tablet, Take 1 tablet (145 mg total) by mouth daily, Disp: 90 tablet, Rfl: 3    oxybutynin (DITROPAN) 5 mg tablet, Take 1 tablet (5 mg total) by mouth every 6 (six) hours as needed (bladder spasms), Disp: 30 tablet, Rfl: 0    potassium chloride (MICRO-K) 10 MEQ CR capsule, Take 4 capsules (40 mEq total) by mouth 2 (two) times a day, Disp: 240 capsule, Rfl: 0    tamsulosin (FLOMAX) 0.4 mg, Take 1 capsule (0.4 mg total) by mouth daily with dinner, Disp: 15 capsule, Rfl: 0    ursodiol (ACTIGALL) 300 mg capsule, Take 3 capsules (900 mg total) by mouth in the morning, Disp: 90 capsule, Rfl: 11    Allergies:  Allergies   Allergen Reactions    Oxaliplatin Shortness Of Breath     Reactions occurred with 2nd and 3rd infusions (about 1-3 hours from initiation of infusion) and required treatment with steroids and antihistamines. Please refer to allergy note on 2/7/2023 for detailed description of her reactions.    Morphine Nausea Only     \"Every dose made me nauseous\" (oral dosing only, can tolerate IV morphine)    Potassium Chloride Other (See Comments)     Pt reports \"burning\" with Iv " potassium administration and wishes for it to be added to chart   :    Social and Family History:   Social History:   Social History     Tobacco Use    Smoking status: Never     Passive exposure: Past    Smokeless tobacco: Never   Vaping Use    Vaping status: Never Used   Substance Use Topics    Alcohol use: Never    Drug use: Never   .    Social History     Tobacco Use   Smoking Status Never    Passive exposure: Past   Smokeless Tobacco Never       Family History:  Family History   Problem Relation Age of Onset    Cancer Mother     Cirrhosis Father     Breast cancer Neg Hx     Breast cancer additional onset Neg Hx    :     Review of Systems:     General: Fever, chills, or night sweats: negative  Cardiac: Negative for chest pain.    Pulmonary: Negative for shortness of breath.  Gastrointestinal: Abdominal pain positive.  Nausea, vomiting, or diarrhea negative,  Genitourinary: See HPI above.  Patient does not have hematuria.  All other systems queried were negative.    Physical Exam:   General: Patient is pleasant and in NAD. Awake and alert  /78 (BP Location: Right arm)   Pulse 80   Temp 98 °F (36.7 °C) (Oral)   Resp 16   SpO2 95%   Cardiac: Peripheral edema: negative  Pulmonary: Non-labored breathing  Abdomen: Soft, non-tender, non-distended.  No surgical scars.  No masses, tenderness, hernias noted.    Genitourinary: Positive CVA tenderness, negative suprapubic tenderness.      Labs:     Lab Results   Component Value Date    HGB 9.6 (L) 03/02/2024    HCT 30.3 (L) 03/02/2024    WBC 7.70 03/02/2024     03/02/2024   ]    Lab Results   Component Value Date    K 3.6 03/02/2024     (H) 03/02/2024    CO2 21 03/02/2024    BUN 44 (H) 03/02/2024    CREATININE 1.56 (H) 03/02/2024    CALCIUM 8.6 03/02/2024   ]    Imaging:   I personally reviewed the images and report of the following studies, and reviewed them with the patient:        Procedure: CT renal stone study abdomen pelvis without  contrast    Result Date: 3/2/2024  Narrative: CT ABDOMEN AND PELVIS WITHOUT IV CONTRAST - LOW DOSE RENAL STONE INDICATION: right flank pain. COMPARISON: Multiple priors most recently 2/7/2024 TECHNIQUE: Low radiation dose thin section CT examination of the abdomen and pelvis was performed without intravenous or oral contrast according to a protocol specifically designed to evaluate for urinary tract calculus. Axial, sagittal, and coronal 2D reformatted images were created from the source data and submitted for interpretation. Evaluation for pathology in the abdomen and pelvis that is unrelated to urinary tract calculi is limited. Radiation dose length product (DLP) for this visit: 123 mGy-cm . This examination, like all CT scans performed in the [facility], was performed utilizing techniques to minimize radiation dose exposure, including the use of iterative reconstruction and automated exposure control. URINARY TRACT FINDINGS: RIGHT KIDNEY AND URETER: Right ureteral stent. There is stone encrustation about the proximal stent measuring up to 0.6 cm (301, 58). Moderate right hydronephrosis. Nonobstructing lower pole calculi measuring up to 0.6 cm. LEFT KIDNEY AND URETER: Left ureteral stent in place. Nonobstructing lower pole calculi measuring up to 0.4 cm. URINARY BLADDER: Unremarkable. ADDITIONAL FINDINGS: LOWER CHEST: Small hiatal hernia. No other clinically significant abnormality in the visualized lower chest. SOLID VISCERA: Limited low radiation dose noncontrast CT evaluation demonstrates no clinically significant abnormality of the imaged portions of the liver, spleen, pancreas, or adrenal glands. GALLBLADDER/BILIARY TREE: No calcified gallstones. No pericholecystic inflammatory change. No biliary dilation. STOMACH AND BOWEL: Rectosigmoid anastomosis. No evidence of bowel obstruction. Right mid abdominal loop ileostomy. APPENDIX: No findings to suggest appendicitis. ABDOMINOPELVIC CAVITY: No  ascites. No pneumoperitoneum. No lymphadenopathy. VESSELS: Atherosclerosis without abdominal aortic aneurysm. REPRODUCTIVE ORGANS: Unremarkable for patient's age. ABDOMINAL WALL/INGUINAL REGIONS: Unremarkable. BONES: No acute fracture or suspicious osseous lesion.     Impression: Moderate right hydronephrosis with a ureteral stent in place. There is stone encrustation about the proximal portion of the stent. Left ureteral stent in place without hydronephrosis. Additional nonobstructing bilateral renal calculi The study was marked in EPIC for immediate notification. Workstation performed: [id number]     Procedure: FL retrograde pyelogram    Result Date: 2/9/2024  Narrative: BILATERAL RETROGRADE PYELOGRAM INDICATION:   Ureteral calculi [N20.1]. COMPARISON: CT abdomen pelvis 2/7/2024. IMAGES:  12 FLUOROSCOPY TIME:  1 MINUTE 31 SECONDS CONTRAST: 20 mL of iohexol (OMNIPAQUE) FINDINGS: Retrograde opacification of the left ureter moderate hydroureter to the level of the distal ureter. There is moderate left hydronephrosis. Final images demonstrate placement of a left ureteral stent. Right ureter is not opacified in the provided images and cannot be evaluated. There is a mild to moderate right hydronephrosis. Right ureteral stent exchange is demonstrated. Osseous and soft tissue detail limited by technique.     Impression: Fluoroscopic guidance provided for bilateral retrograde pyelogram and right ureteral stent exchange/left ureteral stent placement. Please see procedure report for further details. Workstation performed: [id number]     Procedure: CT renal stone study abdomen pelvis without contrast    Result Date: 2/7/2024  Narrative: CT ABDOMEN AND PELVIS WITHOUT IV CONTRAST - LOW DOSE RENAL STONE INDICATION: new L sided hydronephrosis. COMPARISON: 12/14/2023. TECHNIQUE: Low radiation dose thin section CT examination of the abdomen and pelvis was performed without intravenous or oral contrast according to a protocol  specifically designed to evaluate for urinary tract calculus. Axial, sagittal, and coronal 2D reformatted images were created from the source data and submitted for interpretation. Evaluation for pathology in the abdomen and pelvis that is unrelated to urinary tract calculi is limited. Radiation dose length product (DLP) for this visit: 146 mGy-cm . This examination, like all CT scans performed in the [facility], was performed utilizing techniques to minimize radiation dose exposure, including the use of iterative reconstruction and automated exposure control. URINARY TRACT FINDINGS: RIGHT KIDNEY AND URETER: Multiple punctate nonobstructing intrarenal calculi. Right-sided nephroureteral stent is in place. Kadi-stent calcifications in the proximal portion unchanged. No significant change in moderate right hydronephrosis. LEFT KIDNEY AND URETER: Multiple punctate nonobstructing intrarenal calculi. 3 mm calculus in the distal portion of the left ureter causing moderate hydroureteronephrosis. URINARY BLADDER: Right-sided nephroureteral stent terminates in the bladder. ADDITIONAL FINDINGS: LOWER CHEST: Scarring in the left lower lobe. Slight interval increase in the size of several subcentimeter pulmonary nodules in the visualized lung bases. For example, 6 mm left lower lobe nodule (301/3), previously 4 mm) and 6 mm right lower lobe nodule with central cavitation (301/4), previously 5 mm. SOLID VISCERA: Stable 7 mm right adrenal nodule, likely a benign adenoma. Limited low radiation dose noncontrast CT evaluation demonstrates no clinically significant abnormality of the imaged portions of the liver, spleen, pancreas, or left adrenal gland. GALLBLADDER/BILIARY TREE: No calcified gallstones. No pericholecystic inflammatory change. No biliary dilation. STOMACH AND BOWEL: Postsurgical changes in the rectum. Loop ileostomy in the right lower quadrant. No bowel obstruction. APPENDIX: No findings to suggest  appendicitis. ABDOMINOPELVIC CAVITY: No ascites. No pneumoperitoneum. No lymphadenopathy. VESSELS: Unremarkable for patient's age. REPRODUCTIVE ORGANS: Unremarkable for patient's age. ABDOMINAL WALL/INGUINAL REGIONS: Scarring in the anterior abdominal wall. BONES: No acute fracture or suspicious osseous lesion.     Impression: 3 mm distal left ureteral calculus causing moderate hydroureteronephrosis. Right-sided nephroureteral stent in place. There is moderate right hydronephrosis, similar to the prior study. Nonobstructing bilateral renal calculi. Slight interval increase in the size of several subcentimeter pulmonary nodules in the visualized lung bases. Metastasis cannot be excluded. Follow-up is recommended. The study was marked in EPIC for immediate notification. Workstation performed: [id number]     Procedure: US kidney and bladder    Result Date: 2/7/2024  Narrative: RENAL ULTRASOUND INDICATION: dysuria, frequent UTIs. As per review of electronic medical record, patient with history of prior rectal cancer, sarcoidosis, kidney stones and right ureteral stricture. COMPARISON: CT of the abdomen and pelvis from 12/14/2023 and renal ultrasound from 5/24/2023. TECHNIQUE: Ultrasound of the retroperitoneum was performed with a curvilinear transducer utilizing volumetric sweeps and still imaging techniques. FINDINGS: The study was somewhat technically limited. KIDNEYS: Symmetric and normal size. Right kidney: 10.2 x 6.2 x 5.0 cm. Volume 166.1 mL Left kidney: 11.2 x 6.0 x 6.6 cm. Volume 233.9 mL Right kidney Normal echogenicity and contour. No mass is identified. There is mild right-sided hydronephrosis. The proximal tip of the right ureteral stent is not clearly visualized. A 7 mm echogenic focus was seen in the lower pole. No perinephric fluid collections. Left kidney Normal echogenicity and contour. No mass is identified. Moderate left-sided hydronephrosis. No shadowing calculi. The calculi seen on the December 2023  CT are not apparent with sonographic technique. No perinephric fluid collections. URETERS: Nonvisualized. BLADDER: Normally distended. No focal thickening or mass lesions. The distal tip of the right ureteral stent is seen in the urinary bladder. Neither ureteral jet was detected.     Impression: Mild right-sided hydronephrosis. Proximal end of the right ureteral stent is not clearly visualized with sonographic technique. Distal tip of right ureteral stent seen within the urinary bladder. Moderate left-sided hydronephrosis. Workstation performed: [id number]     Procedure: XR abdomen 1 view kub    Result Date: 1/16/2024  Narrative: ABDOMEN INDICATION:   Presence of urogenital implants. . COMPARISON:  None VIEWS:  AP supine FINDINGS: There is a nonobstructive bowel gas pattern. No discernible free air on this supine study.  Upright or left lateral decubitus imaging is more sensitive to detect subtle free air in the appropriate setting. There is a right internal ureteral stent in place. There is seen on prior CT of 12/11/2023. There are punctate calcific densities overlying both renal silhouettes, consistent with nephrolithiasis. Amorphous calcific density is noted in the pelvis uncertain etiology. There is no calcific density along the course of the ureter. No pathologic calcifications or soft tissue masses. Visualized lung bases are clear. Visualized osseous structures are unremarkable for the patient's age.     Impression: Right internal ureteral stent in place. Left internal ureteral stent noted on prior abdomen radiograph 8/25/2022 has been removed. No calcific densities seen along the course of the ureter on the right. Punctate bilateral nephrolithiasis noted as above. Electronically signed: 01/16/2024 12:32 PM Amanuel Simons MD

## 2024-03-02 NOTE — ASSESSMENT & PLAN NOTE
Patient with known history of right ureteral stricture, status post right stent exchange on 12/15/2024.  Patient also previously had a left ureteral lithotripsy with stent placement done on 12/15/2024.   Cystoscopy performed on 1/16/2024 removed left ureteral stent and observed the right ureteral stent in place with some reaction around the ureteral opening.  She was placed on prophylactic antibiotics.

## 2024-03-02 NOTE — H&P
[facility]  H&P  Name: Danielle Haque 58 y.o. female I MRN: [id number]  Unit/Bed#: ED-18 I Date of Admission: 3/1/2024   Date of Service: 3/2/2024 I Hospital Day: 0      Assessment/Plan   * Ureteral calculi with hydroureteronephrosis  Assessment & Plan  Patient with sudden onset right flank pain beginning at 6 AM on 3/1/2024.  The patient attempted to take her home dose of oxycodone however this did not provide any analgesic relief which prompted her to come to the ED.  CT renal study findings as follows:  Moderate right hydronephrosis with a ureteral stent in place. There is stone encrustation about the proximal portion of the stent.  Left ureteral stent in place without hydronephrosis.  Additional nonobstructing bilateral renal calculi  Plan  Urology consulted, recommendations pending  Anticipated to go to the OR today with urology  N.p.o.  Continue pain management  Continue IV fluid hydration    Ureteral stricture  Assessment & Plan  Patient with known history of right ureteral stricture, status post right stent exchange on 12/15/2024.  Patient also previously had a left ureteral lithotripsy with stent placement done on 12/15/2024.   Cystoscopy performed on 1/16/2024 removed left ureteral stent and observed the right ureteral stent in place with some reaction around the ureteral opening.  She was placed on prophylactic antibiotics.    Acute cystitis with hematuria  Assessment & Plan  UA positive for infection with occasional bacteria seen, leukocytes, RBCs, WBCs  Given 1 dose of Rocephin in the ED  Continue Rocephin  Continue to monitor for fevers    THOR (acute kidney injury) (HCC)  Assessment & Plan  Lab Results   Component Value Date    CREATININE 1.75 (H) 03/02/2024    CREATININE 1.06 02/19/2024    CREATININE 1.10 02/08/2024    BUN 48 (H) 03/02/2024    BUN 29 (H) 02/19/2024   Patient with increased in serum creatinine from baseline of 1.05 secondary to nephrolithiasis with  hydronephrosis  Continue IV hydration and avoid nephrotoxins  Continue to monitor BMP    Hypertension  Assessment & Plan  Continue amlodipine with hold precautions     Rectal cancer (HCC)  Assessment & Plan  Stage IIA (cT3N0) mid to high rectal adenocarcinoma, s/p chemotherapy on 6/29/23  Last colonoscopy on 11/1/2023 revealed stable staple line of colorectal anastomosis s/p resection in August 2023. Ostomy bag in place in the RLQ  Follows with GI and Heme/Onc outpatient    Iron deficiency anemia  Assessment & Plan  Lab Results   Component Value Date    HGB 10.8 (L) 03/02/2024    HGB 11.8 02/19/2024    HGB 9.6 (L) 02/09/2024    HGB 9.6 (L) 02/08/2024   Continue to monitor CBC given plans for OR tomorrow         VTE Pharmacologic Prophylaxis: VTE Score: 3 Moderate Risk (Score 3-4) - Pharmacological DVT Prophylaxis Contraindicated. Sequential Compression Devices Ordered.  Code Status: Level 1 - Full Code per patient  Discussion with family: Patient declined call to .     Anticipated Length of Stay: Patient will be admitted on an observation basis with an anticipated length of stay of less than 2 midnights secondary to cystoscopy with stone removal.    Total Time Spent on Date of Encounter in care of patient: 65 mins. This time was spent on one or more of the following: performing physical exam; counseling and coordination of care; obtaining or reviewing history; documenting in the medical record; reviewing/ordering tests, medications or procedures; communicating with other healthcare professionals and discussing with patient's family/caregivers.    Chief Complaint: Right-sided flank pain    History of Present Illness:  Danielle Haque is a 58 y.o. female with a PMH of ureteral stricture, hypertension, rectal cancer, sarcoidosis, adrenal nodule, anemia who presents with severe, sharp right-sided flank pain that began 06:00 on 3/1/2024.  The patient attempted to take her home dose of oxycodone to alleviate  the pain however this provided no analgesic relief, this prompted her to come to the ER.  At present the patient's pain is a 2/10 and is well-controlled with IV Dilaudid.  She endorses nausea/vomiting, dysuria, hematuria, weakness, shortness of breath and palpitations when there are flares of pain.  She denies any cough, chest pain, pelvic pain, swelling, chills.  She denies tobacco use, alcohol use, recreational drug use..    Review of Systems:  Review of Systems   Constitutional:  Negative for chills, fatigue and fever.   HENT:  Negative for postnasal drip and sore throat.    Eyes:  Negative for visual disturbance.   Respiratory:  Positive for shortness of breath. Negative for cough.    Cardiovascular:  Positive for palpitations. Negative for chest pain and leg swelling.   Gastrointestinal:  Positive for nausea and vomiting. Negative for abdominal pain.   Genitourinary:  Positive for dysuria, flank pain and hematuria. Negative for frequency and urgency.   Musculoskeletal:  Negative for arthralgias and joint swelling.   Skin:  Negative for color change and rash.   Neurological:  Positive for weakness. Negative for dizziness, seizures, syncope and headaches.       Past Medical and Surgical History:   Past Medical History:   Diagnosis Date    Bleeding from colostomy stoma (HCC)     Cancer (HCC)     Colon cancer (HCC)     Fall     Headache     Hemochromatosis, unspecified     History of transfusion     2022 - no adverse reaction    Hypertension     Kidney calculi     Kidney stone     Liver disease     hemangioma    Muscle weakness     Personal history of COVID-19 12/2022    Sarcoidosis     Seizures (HCC)     2022    Shingles        Past Surgical History:   Procedure Laterality Date    ABSCESS DRAINAGE      left breast    BREAST BIOPSY      CHEST TUBE INSERTION      COLON SURGERY      colostomy    COLONOSCOPY      FL GUIDED CENTRAL VENOUS ACCESS DEVICE INSERTION  03/21/2023    FL RETROGRADE PYELOGRAM  01/23/2023    FL  RETROGRADE PYELOGRAM  04/03/2023    FL RETROGRADE PYELOGRAM  7/21/2023    FL RETROGRADE PYELOGRAM  10/11/2023    FL RETROGRADE PYELOGRAM  12/15/2023    FL RETROGRADE PYELOGRAM  2/8/2024    IR BIOPSY LIVER MASS  05/09/2023    LUNG BIOPSY      LA CYSTO BLADDER W/URETERAL CATHETERIZATION Bilateral 07/21/2023    Procedure: CYSTOSCOPY URETEROSCOPY RETROGRADE PYELOGRAM WITH BILATERAL STENT URETERAL EXCHANGE; LEFT LASER LITHOTRIPSY AND STONE BASKET RETRIEVAL;  Surgeon: José Luis Stephens MD;  Location: [facility];  Service: Urology    LA CYSTO BLADDER W/URETERAL CATHETERIZATION Bilateral 12/15/2023    Procedure: CYSTOSCOPY, BILATERAL  RETROGRADE PYELOGRAM , STENT EXCHANGE RIGHT , LEFT URETEROSCOPY ,  HOLMIUM LASER WITH INSERTION LEFT URETERAL STENT;  Surgeon: Derick Bhakta MD;  Location: [facility];  Service: Urology    LA CYSTO BLADDER W/URETERAL CATHETERIZATION Left 2/8/2024    Procedure: CYSTOSCOPY B/L RETROGRADE PYELOGRAM WITH B/L T URETERALSTENT EXCHANGE,LEFT URETEROSCOPY, DILATION LEFT URETERAL STICTURE;  Surgeon: José Luis Stephens MD;  [facility];  Service: Urology    LA CYSTO/URETERO W/LITHOTRIPSY &INDWELL STENT INSRT Bilateral 01/23/2023    Procedure: CYSTOSCOPY  RIGHT URETEROSCOPY WITH LITHOTRIPSY HOLMIUM LASER, BILATERAL RETROGRADE PYELOGRAM AND BILATERAL  URETERAL STENT INSERTION;  Surgeon: José Luis Stephens MD;  [facility];  Service: Urology    LA CYSTO/URETERO W/LITHOTRIPSY &INDWELL STENT INSRT Right 04/03/2023    Procedure: RIGHT URETEROSCOPY WITH LITHOTRIPSY HOLMIUM LASER, RETROGRADE PYELOGRAM; BILATERAL EXCHANGE STENT URETERAL;  Surgeon: José Luis Stephens MD;  [facility];  Service: Urology    LA CYSTO/URETERO W/LITHOTRIPSY &INDWELL STENT INSRT Bilateral 10/11/2023    Procedure: CYSTOSCOPY URETEROSCOPY RETROGRADE PYELOGRAM AND INSERTION STENT URETERAL DIAGNOSTINC RIGHT URETEROSCOPY, REMOVAL STENT LEFT SIDE;  Surgeon: José Luis Stephens MD;  Location:  [facility];  Service: Urology    NE INSJ TUNNELED CTR VAD W/SUBQ PORT AGE 5 YR/> N/A 03/21/2023    Procedure: INSERTION VENOUS PORT ( PORT-A-CATH) IR;  Surgeon: Mark Amos DO;  Location: [facility];  Service: Interventional Radiology    NE LAPS COLECTOMY PRTL W/COLOPXTSTMY LW ANAST N/A 8/17/2023    Procedure: RESECTION COLON LOW ANTERIOR LAPAROSCOPIC WITH ROBOTICS;  Surgeon: Sree Umanzor MD;  Location: [facility];  Service: Colorectal    TONSILLECTOMY      URINARY SURGERY Bilateral     bilateral stents       Meds/Allergies:  Prior to Admission medications    Medication Sig Start Date End Date Taking? Authorizing Provider   acetaminophen (TYLENOL) 325 mg tablet Take 2 tablets (650 mg total) by mouth every 4 (four) hours as needed for mild pain 2/8/24   José Luis Stephens MD   acetaminophen (TYLENOL) 500 mg tablet Take 2 tablets (1,000 mg total) by mouth 3 (three) times a day 11/13/23   Koko Pillai MD   amLODIPine (NORVASC) 10 mg tablet Take 1 tablet (10 mg total) by mouth daily 7/19/23   Leena Anaya MD   cyanocobalamin (VITAMIN B-12) 1000 MCG tablet Take 1,000 mcg by mouth daily    Historical Provider, MD   docusate sodium (COLACE) 100 mg capsule Take 1 capsule (100 mg total) by mouth 2 (two) times a day for 15 days 2/8/24 2/23/24  José Luis Stephens MD   fenofibrate (TRICOR) 145 mg tablet Take 1 tablet (145 mg total) by mouth daily 2/2/24 2/1/25  Akua Machuca MD   oxybutynin (DITROPAN) 5 mg tablet Take 1 tablet (5 mg total) by mouth every 6 (six) hours as needed (bladder spasms) 2/8/24   José Luis Stephens MD   potassium chloride (MICRO-K) 10 MEQ CR capsule Take 4 capsules (40 mEq total) by mouth 2 (two) times a day 10/3/23 1/4/24  Esperanza Molina DO   tamsulosin (FLOMAX) 0.4 mg Take 1 capsule (0.4 mg total) by mouth daily with dinner 2/8/24   José Luis Stephens MD   ursodiol (ACTIGALL) 300 mg capsule Take 3 capsules (900 mg total) by mouth in the morning 6/16/23 6/15/24  Akua  "MD Brigette     I have reviewed home medications with patient personally.    Allergies:   Allergies   Allergen Reactions    Oxaliplatin Shortness Of Breath     Reactions occurred with 2nd and 3rd infusions (about 1-3 hours from initiation of infusion) and required treatment with steroids and antihistamines. Please refer to allergy note on 2/7/2023 for detailed description of her reactions.    Morphine Nausea Only     \"Every dose made me nauseous\" (oral dosing only, can tolerate IV morphine)    Potassium Chloride Other (See Comments)     Pt reports \"burning\" with Iv potassium administration and wishes for it to be added to chart       Social History:  Marital Status:    Occupation:   Patient Pre-hospital Living Situation: Home  Patient Pre-hospital Level of Mobility: walks  Patient Pre-hospital Diet Restrictions: none.  Substance Use History:   Social History     Substance and Sexual Activity   Alcohol Use Never     Social History     Tobacco Use   Smoking Status Never    Passive exposure: Past   Smokeless Tobacco Never     Social History     Substance and Sexual Activity   Drug Use Never       Family History:  Family History   Problem Relation Age of Onset    Cancer Mother     Cirrhosis Father     Breast cancer Neg Hx     Breast cancer additional onset Neg Hx        Physical Exam:     Vitals:   Blood Pressure: 152/72 (03/01/24 2351)  Pulse: 99 (03/01/24 2347)  Temperature: 98 °F (36.7 °C) (03/01/24 2347)  Temp Source: Oral (03/01/24 2347)  Respirations: 18 (03/01/24 2347)  SpO2: 100 % (03/01/24 2347)    Physical Exam  Vitals and nursing note reviewed.   Constitutional:       General: She is not in acute distress.     Appearance: She is well-developed.   HENT:      Head: Normocephalic and atraumatic.   Eyes:      Conjunctiva/sclera: Conjunctivae normal.   Cardiovascular:      Rate and Rhythm: Normal rate and regular rhythm.      Heart sounds: No murmur heard.  Pulmonary:      Effort: Pulmonary effort is normal. " No respiratory distress.      Breath sounds: Normal breath sounds.   Abdominal:      General: Bowel sounds are normal.      Palpations: Abdomen is soft.      Tenderness: There is no abdominal tenderness. There is right CVA tenderness. There is no left CVA tenderness.      Comments: Ostomy bag present in the right lower quadrant   Musculoskeletal:         General: No swelling.      Cervical back: Neck supple.   Skin:     General: Skin is warm and dry.      Capillary Refill: Capillary refill takes less than 2 seconds.   Neurological:      Mental Status: She is alert.   Psychiatric:         Mood and Affect: Mood normal.          Additional Data:     Lab Results:  Results from last 7 days   Lab Units 03/02/24  0052   WBC Thousand/uL 9.41   HEMOGLOBIN g/dL 10.8*   HEMATOCRIT % 34.1*   PLATELETS Thousands/uL 216   NEUTROS PCT % 89*   LYMPHS PCT % 4*   MONOS PCT % 6   EOS PCT % 1     Results from last 7 days   Lab Units 03/02/24  0052   SODIUM mmol/L 137   POTASSIUM mmol/L 4.5   CHLORIDE mmol/L 105   CO2 mmol/L 25   BUN mg/dL 48*   CREATININE mg/dL 1.75*   ANION GAP mmol/L 7   CALCIUM mg/dL 10.0   ALBUMIN g/dL 4.0   TOTAL BILIRUBIN mg/dL 0.65   ALK PHOS U/L 188*   ALT U/L 57*   AST U/L 32   GLUCOSE RANDOM mg/dL 140                       Lines/Drains:  Invasive Devices       Central Venous Catheter Line  Duration             Port A Cath 03/21/23 Right Internal jugular 346 days              Peripheral Intravenous Line  Duration             Peripheral IV 03/02/24 Left Antecubital <1 day              Drain  Duration             Ureteral Internal Stent Left ureter 6 Fr. 224 days    Ureteral Internal Stent Right ureter 7 Fr. 224 days    Ileostomy Loop  days    Ureteral Internal Stent Right ureter 7 Fr. 142 days    Ureteral Internal Stent Left ureter 6 Fr. 22 days    Ureteral Internal Stent Right ureter 6 Fr. 22 days                    Central Line:  Goal for removal: N/A - Chronic PICC           Imaging: Reviewed  radiology reports from this admission including: abdominal/pelvic CT  CT renal stone study abdomen pelvis without contrast   ED Interpretation by Rajiv Flores MD (03/02 0120)   URINARY TRACT FINDINGS:     RIGHT KIDNEY AND URETER: Right ureteral stent. There is stone encrustation about the proximal stent measuring up to 0.6 cm (301, 58). Moderate right hydronephrosis. Nonobstructing lower pole calculi measuring up to 0.6 cm.     LEFT KIDNEY AND URETER: Left ureteral stent in place. Nonobstructing lower pole calculi measuring up to 0.4 cm.     URINARY BLADDER: Unremarkable.        ADDITIONAL FINDINGS:     LOWER CHEST: Small hiatal hernia. No other clinically significant abnormality in the visualized lower chest.     SOLID VISCERA: Limited low radiation dose noncontrast CT evaluation demonstrates no clinically significant abnormality of the imaged portions of the liver, spleen, pancreas, or adrenal glands.     GALLBLADDER/BILIARY TREE: No calcified gallstones. No pericholecystic inflammatory change. No biliary dilation.     STOMACH AND BOWEL: Rectosigmoid anastomosis. No evidence of bowel obstruction. Right mid abdominal loop ileostomy.     APPENDIX: No finding   s to suggest appendicitis.     ABDOMINOPELVIC CAVITY: No ascites. No pneumoperitoneum. No lymphadenopathy.     VESSELS: Atherosclerosis without abdominal aortic aneurysm.     REPRODUCTIVE ORGANS: Unremarkable for patient's age.     ABDOMINAL WALL/INGUINAL REGIONS: Unremarkable.     BONES: No acute fracture or suspicious osseous lesion.     IMPRESSION:     Moderate right hydronephrosis with a ureteral stent in place. There is stone encrustation about the proximal portion of the stent.     Left ureteral stent in place without hydronephrosis.     Additional nonobstructing bilateral renal calculi        The study was marked in EPIC for immediate notification.     Workstation performed: [id number]        Final Result by Hiro Lama DO (03/02 0118)       Moderate right hydronephrosis with a ureteral stent in place. There is stone encrustation about the proximal portion of the stent.      Left ureteral stent in place without hydronephrosis.      Additional nonobstructing bilateral renal calculi         The study was marked in EPIC for immediate notification.      Workstation performed: [email or web address]             EKG and Other Studies Reviewed on Admission:   EKG: No EKG obtained.    ** Please Note: This note has been constructed using a voice recognition system. **

## 2024-03-02 NOTE — ED PROVIDER NOTES
History  Chief Complaint   Patient presents with    Flank Pain     Pt reports R flank pain for 2 days. Endorses blood in urine intermittently, decreased urination. Hx of kidney stones.      Patient is a 58 year old female with worsening right flank pain without radiation since earlier today with nausea. No vomiting or diarrhea. (+) hematuria. Has h/o kidney stones and this feels similar. No fever. Tried oxycodone at home without relief. Was last seen at [facility] on 2/14/24 for screening cervical cancer.       History provided by:  Patient (ex )   used: No    Flank Pain  Associated symptoms: hematuria and nausea    Associated symptoms: no fever and no vomiting        Prior to Admission Medications   Prescriptions Last Dose Informant Patient Reported? Taking?   acetaminophen (TYLENOL) 325 mg tablet   No No   Sig: Take 2 tablets (650 mg total) by mouth every 4 (four) hours as needed for mild pain   acetaminophen (TYLENOL) 500 mg tablet  Self No No   Sig: Take 2 tablets (1,000 mg total) by mouth 3 (three) times a day   amLODIPine (NORVASC) 10 mg tablet  Self No No   Sig: Take 1 tablet (10 mg total) by mouth daily   cyanocobalamin (VITAMIN B-12) 1000 MCG tablet  Self Yes No   Sig: Take 1,000 mcg by mouth daily   docusate sodium (COLACE) 100 mg capsule   No No   Sig: Take 1 capsule (100 mg total) by mouth 2 (two) times a day for 15 days   fenofibrate (TRICOR) 145 mg tablet   No No   Sig: Take 1 tablet (145 mg total) by mouth daily   oxybutynin (DITROPAN) 5 mg tablet   No No   Sig: Take 1 tablet (5 mg total) by mouth every 6 (six) hours as needed (bladder spasms)   potassium chloride (MICRO-K) 10 MEQ CR capsule  Self No No   Sig: Take 4 capsules (40 mEq total) by mouth 2 (two) times a day   tamsulosin (FLOMAX) 0.4 mg   No No   Sig: Take 1 capsule (0.4 mg total) by mouth daily with dinner   ursodiol (ACTIGALL) 300 mg capsule  Self No No   Sig: Take 3 capsules (900 mg total)  by mouth in the morning      Facility-Administered Medications: None       Past Medical History:   Diagnosis Date    Bleeding from colostomy stoma (HCC)     Cancer (HCC)     Colon cancer (HCC)     Fall     Headache     Hemochromatosis, unspecified     History of transfusion     2022 - no adverse reaction    Hypertension     Kidney calculi     Kidney stone     Liver disease     hemangioma    Muscle weakness     Personal history of COVID-19 12/2022    Sarcoidosis     Seizures (HCC)     2022    Shingles        Past Surgical History:   Procedure Laterality Date    ABSCESS DRAINAGE      left breast    BREAST BIOPSY      CHEST TUBE INSERTION      COLON SURGERY      colostomy    COLONOSCOPY      FL GUIDED CENTRAL VENOUS ACCESS DEVICE INSERTION  03/21/2023    FL RETROGRADE PYELOGRAM  01/23/2023    FL RETROGRADE PYELOGRAM  04/03/2023    FL RETROGRADE PYELOGRAM  7/21/2023    FL RETROGRADE PYELOGRAM  10/11/2023    FL RETROGRADE PYELOGRAM  12/15/2023    FL RETROGRADE PYELOGRAM  2/8/2024    IR BIOPSY LIVER MASS  05/09/2023    LUNG BIOPSY      TN CYSTO BLADDER W/URETERAL CATHETERIZATION Bilateral 07/21/2023    Procedure: CYSTOSCOPY URETEROSCOPY RETROGRADE PYELOGRAM WITH BILATERAL STENT URETERAL EXCHANGE; LEFT LASER LITHOTRIPSY AND STONE BASKET RETRIEVAL;  Surgeon: José Luis Stephens MD;  [facility];  Service: Urology    TN CYSTO BLADDER W/URETERAL CATHETERIZATION Bilateral 12/15/2023    Procedure: CYSTOSCOPY, BILATERAL  RETROGRADE PYELOGRAM , STENT EXCHANGE RIGHT , LEFT URETEROSCOPY ,  HOLMIUM LASER WITH INSERTION LEFT URETERAL STENT;  Surgeon: Derick Bhakta MD;  [facility];  Service: Urology    TN CYSTO BLADDER W/URETERAL CATHETERIZATION Left 2/8/2024    Procedure: CYSTOSCOPY B/L RETROGRADE PYELOGRAM WITH B/L T URETERALSTENT EXCHANGE,LEFT URETEROSCOPY, DILATION LEFT URETERAL STICTURE;  Surgeon: José Luis Stephens MD;  [facility];  Service: Urology    TN CYSTO/URETERO W/LITHOTRIPSY  &INDWELL STENT INSRT Bilateral 01/23/2023    Procedure: CYSTOSCOPY  RIGHT URETEROSCOPY WITH LITHOTRIPSY HOLMIUM LASER, BILATERAL RETROGRADE PYELOGRAM AND BILATERAL  URETERAL STENT INSERTION;  Surgeon: José Luis Stephens MD;  [facility];  Service: Urology    AR CYSTO/URETERO W/LITHOTRIPSY &INDWELL STENT INSRT Right 04/03/2023    Procedure: RIGHT URETEROSCOPY WITH LITHOTRIPSY HOLMIUM LASER, RETROGRADE PYELOGRAM; BILATERAL EXCHANGE STENT URETERAL;  Surgeon: José Luis Stephens MD;  [facility];  Service: Urology    AR CYSTO/URETERO W/LITHOTRIPSY &INDWELL STENT INSRT Bilateral 10/11/2023    Procedure: CYSTOSCOPY URETEROSCOPY RETROGRADE PYELOGRAM AND INSERTION STENT URETERAL DIAGNOSTINC RIGHT URETEROSCOPY, REMOVAL STENT LEFT SIDE;  Surgeon: José Luis Stephens MD;  [facility];  Service: Urology    AR INSJ TUNNELED CTR VAD W/SUBQ PORT AGE 5 YR/> N/A 03/21/2023    Procedure: INSERTION VENOUS PORT ( PORT-A-CATH) IR;  Surgeon: Mark Amos DO;  [facility];  Service: Interventional Radiology    AR LAPS COLECTOMY PRTL W/COLOPXTSTMY LW ANAST N/A 8/17/2023    Procedure: RESECTION COLON LOW ANTERIOR LAPAROSCOPIC WITH ROBOTICS;  Surgeon: Sree Umanzor MD;  [facility];  Service: Colorectal    TONSILLECTOMY      URINARY SURGERY Bilateral     bilateral stents       Family History   Problem Relation Age of Onset    Cancer Mother     Cirrhosis Father     Breast cancer Neg Hx     Breast cancer additional onset Neg Hx      I have reviewed and agree with the history as documented.    E-Cigarette/Vaping    E-Cigarette Use Never User      E-Cigarette/Vaping Substances    Nicotine No     THC No     CBD No     Flavoring No     Other No     Unknown No      Social History     Tobacco Use    Smoking status: Never     Passive exposure: Past    Smokeless tobacco: Never   Vaping Use    Vaping status: Never Used   Substance Use Topics    Alcohol use: Never    Drug use: Never        Review of Systems   Constitutional:  Negative for fever.   Gastrointestinal:  Positive for nausea. Negative for vomiting.   Genitourinary:  Positive for flank pain and hematuria.   All other systems reviewed and are negative.      Physical Exam  Physical Exam  Vitals and nursing note reviewed.   Constitutional:       General: She is in acute distress (moderate).   HENT:      Head: Normocephalic and atraumatic.      Mouth/Throat:      Comments: Mucous membranes somewhat moist.   Eyes:      General: No scleral icterus.  Cardiovascular:      Rate and Rhythm: Normal rate and regular rhythm.      Heart sounds: Normal heart sounds. No murmur heard.  Pulmonary:      Effort: Pulmonary effort is normal. No respiratory distress.      Breath sounds: Normal breath sounds.   Abdominal:      General: Bowel sounds are normal. There is no distension.      Palpations: Abdomen is soft.      Tenderness: There is no abdominal tenderness. There is right CVA tenderness.   Musculoskeletal:         General: No deformity.      Cervical back: Normal range of motion and neck supple.      Right lower leg: No edema.      Left lower leg: No edema.   Skin:     General: Skin is warm and dry.      Coloration: Skin is not jaundiced.      Findings: No erythema or rash.   Neurological:      General: No focal deficit present.      Mental Status: She is alert and oriented to person, place, and time.   Psychiatric:         Mood and Affect: Mood normal.         Vital Signs  ED Triage Vitals   Temperature Pulse Respirations Blood Pressure SpO2   03/01/24 2347 03/01/24 2347 03/01/24 2347 03/01/24 2351 03/01/24 2347   98 °F (36.7 °C) 99 18 152/72 100 %      Temp Source Heart Rate Source Patient Position - Orthostatic VS BP Location FiO2 (%)   03/01/24 2347 03/01/24 2347 03/01/24 2351 03/01/24 2351 --   Oral Monitor Sitting Right arm       Pain Score       03/01/24 2347       6           Vitals:    03/01/24 2347 03/01/24 2351   BP:  152/72   Pulse: 99     Patient Position - Orthostatic VS:  Sitting         Visual Acuity      ED Medications  Medications   acetaminophen (TYLENOL) tablet 650 mg (has no administration in time range)   amLODIPine (NORVASC) tablet 10 mg (has no administration in time range)   fenofibrate (TRICOR) tablet 145 mg (has no administration in time range)   oxybutynin (DITROPAN) tablet 5 mg (has no administration in time range)   sodium chloride 0.9 % infusion (100 mL/hr Intravenous Rate/Dose Change 3/2/24 0327)   ondansetron (ZOFRAN) injection 4 mg (has no administration in time range)   HYDROmorphone (DILAUDID) injection 0.5 mg (has no administration in time range)   ceftriaxone (ROCEPHIN) 1 g/50 mL in dextrose IVPB (has no administration in time range)   sodium chloride 0.9 % bolus 1,000 mL (0 mL Intravenous Stopped 3/2/24 0257)   HYDROmorphone (DILAUDID) injection 0.5 mg (0.5 mg Intravenous Given 3/2/24 0054)   ondansetron (ZOFRAN) injection 4 mg (4 mg Intravenous Given 3/2/24 0054)   HYDROmorphone (DILAUDID) injection 1 mg (1 mg Intravenous Given 3/2/24 0225)       Diagnostic Studies  Results Reviewed       Procedure Component Value Units Date/Time    Urine Microscopic [[id number]]  (Abnormal) Collected: 03/02/24 0310    Lab Status: Final result Specimen: Urine, Clean Catch Updated: 03/02/24 0341     RBC, UA Innumerable /hpf      WBC, UA 30-50 /hpf      Epithelial Cells None Seen /hpf      Bacteria, UA Occasional /hpf      MUCUS THREADS Moderate     Budding Yeast Present    Urine culture [[id number]] Collected: 03/02/24 0310    Lab Status: In process Specimen: Urine, Clean Catch Updated: 03/02/24 0341    UA w Reflex to Microscopic w Reflex to Culture [[id number]]  (Abnormal) Collected: 03/02/24 0310    Lab Status: Final result Specimen: Urine, Clean Catch Updated: 03/02/24 0319     Color, UA Light Orange     Clarity, UA Turbid     Specific Gravity, UA 1.015     pH, UA 5.5     Leukocytes, UA Moderate     Nitrite, UA Negative     Protein, UA 30  (1+) mg/dl      Glucose, UA Negative mg/dl      Ketones, UA Negative mg/dl      Urobilinogen, UA <2.0 mg/dl      Bilirubin, UA Negative     Occult Blood, UA Large    Comprehensive metabolic panel [[id number]]  (Abnormal) Collected: 03/02/24 0052    Lab Status: Final result Specimen: Blood from Arm, Left Updated: 03/02/24 0119     Sodium 137 mmol/L      Potassium 4.5 mmol/L      Chloride 105 mmol/L      CO2 25 mmol/L      ANION GAP 7 mmol/L      BUN 48 mg/dL      Creatinine 1.75 mg/dL      Glucose 140 mg/dL      Calcium 10.0 mg/dL      AST 32 U/L      ALT 57 U/L      Alkaline Phosphatase 188 U/L      Total Protein 6.8 g/dL      Albumin 4.0 g/dL      Total Bilirubin 0.65 mg/dL      eGFR 32 ml/min/1.73sq m     Narrative:      National Kid[employer] Disease Foundation guidelines for Chronic Kidney Disease (CKD):     Stage 1 with normal or high GFR (GFR > 90 mL/min/1.73 square meters)    Stage 2 Mild CKD (GFR = 60-89 mL/min/1.73 square meters)    Stage 3A Moderate CKD (GFR = 45-59 mL/min/1.73 square meters)    Stage 3B Moderate CKD (GFR = 30-44 mL/min/1.73 square meters)    Stage 4 Severe CKD (GFR = 15-29 mL/min/1.73 square meters)    Stage 5 End Stage CKD (GFR <15 mL/min/1.73 square meters)  Note: GFR calculation is accurate only with a steady state creatinine    Lipase [[id number]]  (Normal) Collected: 03/02/24 0052    Lab Status: Final result Specimen: Blood from Arm, Left Updated: 03/02/24 0119     Lipase 28 u/L     CK [[id number]]  (Normal) Collected: 03/02/24 0052    Lab Status: Final result Specimen: Blood from Arm, Left Updated: 03/02/24 0119     Total CK 34 U/L     CBC and differential [[id number]]  (Abnormal) Collected: 03/02/24 0052    Lab Status: Final result Specimen: Blood from Arm, Left Updated: 03/02/24 0109     WBC 9.41 Thousand/uL      RBC 4.10 Million/uL      Hemoglobin 10.8 g/dL      Hematocrit 34.1 %      MCV 83 fL      MCH 26.3 pg      MCHC 31.7 g/dL      RDW 15.9 %      MPV 8.9 fL      Platelets 216  Thousands/uL      nRBC 0 /100 WBCs      Neutrophils Relative 89 %      Immat GRANS % 0 %      Lymphocytes Relative 4 %      Monocytes Relative 6 %      Eosinophils Relative 1 %      Basophils Relative 0 %      Neutrophils Absolute 8.31 Thousands/µL      Immature Grans Absolute 0.03 Thousand/uL      Lymphocytes Absolute 0.41 Thousands/µL      Monocytes Absolute 0.60 Thousand/µL      Eosinophils Absolute 0.05 Thousand/µL      Basophils Absolute 0.01 Thousands/µL                    CT renal stone study abdomen pelvis without contrast   ED Interpretation by Raijv Flores MD (03/02 0120)   URINARY TRACT FINDINGS:     RIGHT KIDNEY AND URETER: Right ureteral stent. There is stone encrustation about the proximal stent measuring up to 0.6 cm (301, 58). Moderate right hydronephrosis. Nonobstructing lower pole calculi measuring up to 0.6 cm.     LEFT KIDNEY AND URETER: Left ureteral stent in place. Nonobstructing lower pole calculi measuring up to 0.4 cm.     URINARY BLADDER: Unremarkable.        ADDITIONAL FINDINGS:     LOWER CHEST: Small hiatal hernia. No other clinically significant abnormality in the visualized lower chest.     SOLID VISCERA: Limited low radiation dose noncontrast CT evaluation demonstrates no clinically significant abnormality of the imaged portions of the liver, spleen, pancreas, or adrenal glands.     GALLBLADDER/BILIARY TREE: No calcified gallstones. No pericholecystic inflammatory change. No biliary dilation.     STOMACH AND BOWEL: Rectosigmoid anastomosis. No evidence of bowel obstruction. Right mid abdominal loop ileostomy.     APPENDIX: No finding   s to suggest appendicitis.     ABDOMINOPELVIC CAVITY: No ascites. No pneumoperitoneum. No lymphadenopathy.     VESSELS: Atherosclerosis without abdominal aortic aneurysm.     REPRODUCTIVE ORGANS: Unremarkable for patient's age.     ABDOMINAL WALL/INGUINAL REGIONS: Unremarkable.     BONES: No acute fracture or suspicious osseous lesion.      IMPRESSION:     Moderate right hydronephrosis with a ureteral stent in place. There is stone encrustation about the proximal portion of the stent.     Left ureteral stent in place without hydronephrosis.     Additional nonobstructing bilateral renal calculi        The study was marked in EPIC for immediate notification.     Workstation performed: [id number]        Final Result by Hiro Lama DO (03/02 0118)      Moderate right hydronephrosis with a ureteral stent in place. There is stone encrustation about the proximal portion of the stent.      Left ureteral stent in place without hydronephrosis.      Additional nonobstructing bilateral renal calculi         The study was marked in EPIC for immediate notification.      Workstation performed: [id number]                    Procedures  Procedures         ED Course  ED Course as of 03/02/24 0344   Sat Mar 02, 2024   0142 Labs and CT d/w patient. Patient still with pain so more IV dilaudid ordered.    0210 D/w urology AP on call and can admit to [facility] at [facility].                                              Medical Decision Making  DDx including but not limited to: renal colic, pyelonephritis, UTI, GI etiology, appendicitis, diverticulitis, pancreatitis, cholecystitis, biliary colic, doubt AAA; rhabdomyolysis, tumor, zoster.       Amount and/or Complexity of Data Reviewed  Labs: ordered. Decision-making details documented in ED Course.  Radiology: ordered. Decision-making details documented in ED Course.    Risk  Prescription drug management.  Parenteral controlled substances.  Decision regarding hospitalization.             Disposition  Final diagnoses:   Renal colic on right side   Nausea   THOR (acute kidney injury) (HCC)   Hydronephrosis   UTI (urinary tract infection)     Time reflects when diagnosis was documented in both MDM as applicable and the Disposition within this note       Time User Action Codes Description Comment    3/2/2024  1:21 AM Rajiv Flores  Add [N23] Renal colic on right side     3/2/2024  1:21 AM Rajiv Flores S Add [R11.0] Nausea     3/2/2024  1:21 AM Rajiv Flores Add [N17.9] THOR (acute kidney injury) (HCC)     3/2/2024  1:21 AM Mark Rajiv S Add [N13.30] Hydronephrosis     3/2/2024  2:29 AM Tarsha Medellin Add [N13.5] Ureteral stricture     3/2/2024  2:48 AM Jill Prado Add [N20.1] Ureteral calculi with hydroureteronephrosis     3/2/2024  3:44 AM Rajiv Flores Add [N39.0] UTI (urinary tract infection)           ED Disposition       ED Disposition   Admit    Condition   Stable    Date/Time   Sat Mar 2, 2024  2:23 AM    Comment   Case was discussed with SOFIYA Galaviz and the patient's admission status was agreed to be Admission Status: observation status to the service of Dr. Medeiros .               Follow-up Information    None         Patient's Medications   Discharge Prescriptions    No medications on file       No discharge procedures on file.    PDMP Review         Value Time User    PDMP Reviewed  Yes 3/2/2024 12:03 AM Rajiv Flores MD            ED Provider  Electronically Signed by             Rajiv Flores MD  03/02/24 0224       Rajiv Flores MD  03/02/24 0344

## 2024-03-02 NOTE — ASSESSMENT & PLAN NOTE
Lab Results   Component Value Date    CREATININE 1.75 (H) 03/02/2024    CREATININE 1.06 02/19/2024    CREATININE 1.10 02/08/2024    BUN 48 (H) 03/02/2024    BUN 29 (H) 02/19/2024   Patient with increased in serum creatinine from baseline of 1.05 secondary to nephrolithiasis with hydronephrosis  Continue IV hydration and avoid nephrotoxins  Continue to monitor BMP

## 2024-03-02 NOTE — ASSESSMENT & PLAN NOTE
Patient with sudden onset right flank pain beginning at 6 AM on 3/1/2024.  The patient attempted to take her home dose of oxycodone however this did not provide any analgesic relief which prompted her to come to the ED.  CT renal study findings as follows:  Moderate right hydronephrosis with a ureteral stent in place. There is stone encrustation about the proximal portion of the stent.  Left ureteral stent in place without hydronephrosis.  Additional nonobstructing bilateral renal calculi  Plan  Urology consulted, recommendations pending  Anticipated to go to the OR today with urology  N.p.o.  Continue pain management  Continue IV fluid hydration

## 2024-03-02 NOTE — ED NOTES
Per PA-C, second set of cultures is not warranted and antibiotic can be started at this time.     Debra Cisneros RN  03/02/24 0605

## 2024-03-02 NOTE — DISCHARGE SUMMARY
Discharge Summary - Medical Danielle Haque 58 y.o. female MRN: [id number]    Formerly [facility], [facility] Room / Bed: ED-18/ED-18 Encounter: [id number]    BRIEF OVERVIEW      Admission Date: 3/1/2024       Discharge Date: 3/2/2024      Admitting Diagnosis: Right renal colic    Primary Discharge Diagnosis  Principal Problem:    Ureteral calculi with hydroureteronephrosis  Active Problems:    Hypertension    Rectal cancer (HCC)    Ureteral stricture    THOR (acute kidney injury) (HCC)  Resolved Problems:    Acute cystitis with hematuria    Iron deficiency anemia      Service:  [facility] Internal Medicine    Consulting Providers   Urology Dr. Stephens    Procedures Performed     CT renal stone study abdomen pelvis without contrast: Moderate right hydronephrosis with a ureteral stent in place. There is stone encrustation about the proximal portion of the stent., Left ureteral stent in place without hydronephrosis., Additional nonobstructing bilateral renal calculi     Hospital Course by Problem  1 complex nephrolithiasis presenting with renal colic which is now resolved with IV hydration.  Seen by urology..  He has bilateral ureteral stents send CT head revealed encrustation of the right ureteral stent.  This was felt by urology to be secondary to her known ureteral stricture disease and the stents were in appropriate position with no perinephric stranding and there was no indication to exchange her ureteral stents.  Patient has a close follow-up with urology and will follow-up with them.  She has been cleared for discharge     Acute kidney injury secondary to dehydration.  Which is now normalized with IV fluids.    Discharge Condition: Improved    Discharge Disposition: Home/Self Care    Discharge summary:     58-year-old female with complex urology history who follows up closely with Dr. Stephens.  Was admitted with right-sided renal colic and a CT had revealed some encrustation over the right ureter.   This was reviewed by her urologist Dr. Stephens who felt that her symptoms had now resolved with IV hydration and the stents went in appropriate position.  The encrustation was because of her chronic ureteral stents and no intervention was indicated currently she is stable for discharge and will follow-up with them as an outpatient      Discharge Medications   Please see Medical Reconciliation Discharge Form    Discharge Follow Up Appointments:   PCP  Urology    Discharge  Statement   Total Time Spent today including physical exam, discussion with patient and family, and discharge arrangements/care 25minutes.

## 2024-03-02 NOTE — ASSESSMENT & PLAN NOTE
Stage IIA (cT3N0) mid to high rectal adenocarcinoma, s/p chemotherapy on 6/29/23  Last colonoscopy on 11/1/2023 revealed stable staple line of colorectal anastomosis s/p resection in August 2023. Ostomy bag in place in the RLQ  Follows with GI and Heme/Onc outpatient

## 2024-03-02 NOTE — ASSESSMENT & PLAN NOTE
UA positive for infection with occasional bacteria seen, leukocytes, RBCs, WBCs  Given 1 dose of Rocephin in the ED  Continue Rocephin  Continue to monitor for fevers

## 2024-03-03 LAB — BACTERIA UR CULT: NORMAL

## 2024-03-07 ENCOUNTER — APPOINTMENT (OUTPATIENT)
Dept: LAB | Facility: AMBULARY SURGERY CENTER | Age: 59
End: 2024-03-07
Payer: COMMERCIAL

## 2024-03-07 DIAGNOSIS — Z96.0 RETAINED URETERAL STENT: ICD-10-CM

## 2024-03-07 DIAGNOSIS — C18.2 MALIGNANT NEOPLASM OF ASCENDING COLON (HCC): ICD-10-CM

## 2024-03-07 DIAGNOSIS — D50.9 IRON DEFICIENCY ANEMIA, UNSPECIFIED IRON DEFICIENCY ANEMIA TYPE: ICD-10-CM

## 2024-03-07 DIAGNOSIS — R39.89 SUSPECTED UTI: ICD-10-CM

## 2024-03-07 DIAGNOSIS — D86.89 HEPATIC GRANULOMA ASSOCIATED WITH SARCOIDOSIS: ICD-10-CM

## 2024-03-07 LAB
BACTERIA BLD CULT: NORMAL
CEA SERPL-MCNC: 4.9 NG/ML (ref 0–3)
FERRITIN SERPL-MCNC: 40 NG/ML (ref 11–307)
GGT SERPL-CCNC: 183 U/L (ref 9–64)
IGA SERPL-MCNC: 177 MG/DL (ref 66–433)
IGG SERPL-MCNC: 757 MG/DL (ref 635–1741)
IGM SERPL-MCNC: 40 MG/DL (ref 45–281)
IRON SATN MFR SERPL: 7 % (ref 15–50)
IRON SERPL-MCNC: 48 UG/DL (ref 50–212)
TIBC SERPL-MCNC: 721 UG/DL (ref 250–450)
UIBC SERPL-MCNC: 673 UG/DL (ref 155–355)

## 2024-03-07 PROCEDURE — 82784 ASSAY IGA/IGD/IGG/IGM EACH: CPT

## 2024-03-07 PROCEDURE — 82728 ASSAY OF FERRITIN: CPT

## 2024-03-07 PROCEDURE — 83540 ASSAY OF IRON: CPT

## 2024-03-07 PROCEDURE — 87086 URINE CULTURE/COLONY COUNT: CPT

## 2024-03-07 PROCEDURE — 82977 ASSAY OF GGT: CPT

## 2024-03-07 PROCEDURE — 83550 IRON BINDING TEST: CPT

## 2024-03-07 PROCEDURE — 82378 CARCINOEMBRYONIC ANTIGEN: CPT

## 2024-03-08 LAB — BACTERIA UR CULT: NORMAL

## 2024-03-11 ENCOUNTER — HOSPITAL ENCOUNTER (OUTPATIENT)
Dept: RADIOLOGY | Age: 59
Discharge: HOME | End: 2024-03-11
Payer: COMMERCIAL

## 2024-03-11 DIAGNOSIS — C20 RECTAL CANCER (HCC): ICD-10-CM

## 2024-03-11 PROCEDURE — 71260 CT THORAX DX C+: CPT

## 2024-03-11 PROCEDURE — G1004 CDSM NDSC: HCPCS

## 2024-03-11 PROCEDURE — 74177 CT ABD & PELVIS W/CONTRAST: CPT

## 2024-03-11 RX ADMIN — IOHEXOL 100 ML: 350 INJECTION, SOLUTION INTRAVENOUS at 10:32

## 2024-03-18 ENCOUNTER — PREP FOR PROCEDURE (OUTPATIENT)
Dept: UROLOGY | Facility: AMBULATORY SURGERY CENTER | Age: 59
End: 2024-03-18

## 2024-03-18 ENCOUNTER — RADIATION ONCOLOGY FOLLOW-UP (OUTPATIENT)
Dept: RADIATION ONCOLOGY | Facility: HOSPITAL | Age: 59
End: 2024-03-18
Attending: STUDENT IN AN ORGANIZED HEALTH CARE EDUCATION/TRAINING PROGRAM
Payer: COMMERCIAL

## 2024-03-18 VITALS
TEMPERATURE: 98.6 F | DIASTOLIC BLOOD PRESSURE: 80 MMHG | HEART RATE: 62 BPM | SYSTOLIC BLOOD PRESSURE: 124 MMHG | OXYGEN SATURATION: 100 % | RESPIRATION RATE: 18 BRPM

## 2024-03-18 DIAGNOSIS — C20 RECTAL CANCER (HCC): ICD-10-CM

## 2024-03-18 DIAGNOSIS — R39.89 SUSPECTED UTI: ICD-10-CM

## 2024-03-18 DIAGNOSIS — N13.5 URETERAL STRICTURE: Primary | ICD-10-CM

## 2024-03-18 DIAGNOSIS — R91.8 LUNG NODULE, MULTIPLE: Primary | ICD-10-CM

## 2024-03-18 PROCEDURE — 99215 OFFICE O/P EST HI 40 MIN: CPT | Performed by: STUDENT IN AN ORGANIZED HEALTH CARE EDUCATION/TRAINING PROGRAM

## 2024-03-18 NOTE — PROGRESS NOTES
Danielle Haque 1965 is a 58 y.o. female with a history of sarcoidosis who presented with Stage IIA (cT3N0) mid to high rectal adenocarcinoma. She was started on Cape[place] in [place] and received 3 cycles with significant difficulty. She thereafter transferred her care to [facility] with decision to transition to chemoRT. On 5/25/23 she completed a course of neoadjuvant chemoRT to a dose of 5040 cGy in 28 fractions with concurrent 5-FU.   She was last seen on 12/18/23.At that time, CT chest/abdomen/pelvis demonstrated multiple new and enlarging pulmonary nodules.  She was referred to IR for possible biopsy of the largest CHANDANA lesion. Further recommendations will be made pending review of this pathology.        1/8/24 THORACIC ONCOLOGY MULTIDISCIPLINARY CASE REVIEW   PHYSICIAN RECOMMENDED PLAN: Obtain CT chest abdomen pelvis contrast w contrast in 3 months, if enlargement of pulmonary nodules refer to Thoracic Surgery or IR for biopsy.        3/11/24 CT chest abdomen pelvis  Persistent mild left-sided hydroureteronephrosis with stably positioned double-J ureteral stent in place. Left-sided nephrolithiasis.     Improved right-sided hydronephrosis with stably positioned double-J ureteral stent in place. Right-sided nephrolithiasis.     Essentially stable pulmonary nodules, majority of which are cavitary. Again, the differential diagnosis includes but is not limited to metastatic disease and cavitary pulmonary sarcoidosis. No mediastinal lymphadenopathy.     Spleen top normal in size with heterogeneous appearance and some nodularity, similar to prior, likely related to the patient's known history of sarcoidosis.       Upcoming appts:  3/19/24 Med Onc, Dr. Ferrera    Oncology History   Malignant neoplasm of sigmoid colon (HCC)   10/28/2022 Biopsy    Colon, Rectosigmoid Colon Mass Biopsy (1 slide [id number] [facility], collected 10/28/2022):  - Adenocarcinoma arising in a tubular adenoma     12/1/2022 Biopsy     DIAGNOSIS LIVER, SEGMENT 3, RESECTION:  - HYALINIZED SUBCAPSULAR AND INTRA-PARENCHYMAL NODULES. NO MALIGNANCY SEEN. SEE NOTE  - MILD MACROVESICULAR STEATOSIS (25%). NO EVIDENCE OF STEATOHEPATITIS. TRICHOME STAIN SHOWS MILD PORTAL FIBROSIS.   - 2+ IRON DEPOSITION WITHIN KUPFFER CELLS (IRON STAIN), CONSISTENT WITH SECONDARY HEMOCHROMATOSIS.     NOTE: THIS LIKELY REPRESENTS A MARKEDLY SCLEROTIC HEMANGIOMA OR CHANGES SECONDARY TO INJURY.               12/1/2022 Surgery    Laparoscopic diverting colostomy with liver biopsy. Dr. Serrano.      12/25/2022 Initial Diagnosis    Malignant neoplasm of sigmoid colon (HCC)     2022 - 1/10/2023 Chemotherapy    Oxaliplatin. [place], [place]     2/24/2023 -  Cancer Staged    Staging form: Colon and Rectum, AJCC 8th Edition  - Clinical: Stage IIA (cT3, cN0, cM0) - Signed by Edouard Ferrera MD on 2/24/2023  Total positive nodes: 0       4/17/2023 -  Chemotherapy    potassium chloride, 20 mEq, Intravenous, Once, 3 of 3 cycles  Administration: 20 mEq (4/17/2023), 20 mEq (4/24/2023), 20 mEq (5/1/2023)  alteplase (CATHFLO), 2 mg, Intracatheter, Every 1 Minute as needed, 6 of 6 cycles  fluorouracil (ADRUCIL) ambulatory infusion Soln, 225 mg/m2/day = 1,880 mg, Intravenous, Over 120 hours, 6 of 6 cycles     4/17/2023 - 5/25/2023 Radiation    Treatments:  Course: C1    Plan ID Energy Fractions Dose per Fraction (cGy) Dose Correction (cGy) Total Dose Delivered (cGy) Elapsed Days   CD Rectum 6X 3 / 3 180 0 540 2   Whole Pelvis 6X 25 / 25 180 0 4,500 35      Treatment Dates:  4/17/2023 - 5/25/2023.      Rectal cancer (HCC)   7/14/2023 Initial Diagnosis    Rectal cancer (HCC)     9/6/2023 -  Cancer Staged    Staging form: Colon and Rectum, AJCC 8th Edition  - Clinical: Stage IIA (cT3, cN0, cM0) - Signed by Edouard Ferrera MD on 9/6/2023  Total positive nodes: 0           Review of Systems:  Review of Systems   Constitutional:  Positive for fatigue.   HENT:  Positive for  nosebleeds (Intermittent).    Eyes: Negative.    Respiratory: Negative.     Cardiovascular: Negative.    Gastrointestinal:  Positive for rectal pain. Negative for constipation and diarrhea.   Endocrine: Negative.    Genitourinary:  Positive for dysuria, hematuria (Occasional) and vaginal pain.   Musculoskeletal:         Intense rectal pain x 2 in the last 2 weeks   Allergic/Immunologic: Negative.    Neurological: Negative.    Hematological: Negative.    Psychiatric/Behavioral: Negative.         Clinical Trial: no      Health Maintenance   Topic Date Due    COVID-19 Vaccine (1) Never done    HIV Screening  Never done    BMI: Followup Plan  Never done    Annual Physical  Never done    Zoster Vaccine (1 of 2) Never done    Cervical Cancer Screening  Never done    Osteoporosis Screening  Never done    Influenza Vaccine (1) 06/30/2024 (Originally 9/1/2023)    Pneumococcal Vaccine: Pediatrics (0 to 5 Years) and At-Risk Patients (6 to 64 Years) (1 of 2 - PCV) 12/04/2024 (Originally [birth year]17/1971)    DTaP,Tdap,and Td Vaccines (1 - Tdap) 12/04/2024 (Originally 11/17/1988)    Breast Cancer Screening: Mammogram  07/26/2024    Colorectal Cancer Screening  11/01/2024    BMI: Adult  02/14/2025    Depression Screening  03/18/2025    Hepatitis C Screening  Completed    HIB Vaccine  Aged Out    IPV Vaccine  Aged Out    Hepatitis A Vaccine  Aged Out    Meningococcal ACWY Vaccine  Aged Out    HPV Vaccine  Aged Out     Patient Active Problem List   Diagnosis    Malignant neoplasm of sigmoid colon (HCC)    History of Clostridium difficile colitis    Other hemochromatosis    Hypertension    Sarcoidosis    Impaired fasting glucose    Hypokalemia    Transaminitis    Hydronephrosis due to ureteral stricture    Adrenal nodule (HCC)    Other specified anemias    Nonrheumatic mitral valve regurgitation    Advance directive discussed with patient    Skin lesion    Abnormal CT of the chest    Kidney calculi    Dysuria    Chest pain    Creatinine  elevation    Port-A-Cath in place    Drug-induced constipation    Palliative care patient    Cancer related pain    Rectal cancer (HCC)    Annual physical exam    Preop examination    Shingles    Partial small bowel obstruction (HCC)    Scalp pain    Left ear pain    Chronic anticoagulation    Neuralgia involving scalp    Anxiety about health    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    Ureteral stricture    Ureteral calculi with hydroureteronephrosis    Pulmonary nodules    Lump of skin    THOR (acute kidney injury) (HCC)     Past Medical History:   Diagnosis Date    Bleeding from colostomy stoma (HCC)     Cancer (HCC)     Colon cancer (HCC)     Fall     Headache     Hemochromatosis, unspecified     History of transfusion     2022 - no adverse reaction    Hypertension     Kidney calculi     Kidney stone     Liver disease     hemangioma    Muscle weakness     Personal history of COVID-19 12/2022    Sarcoidosis     Seizures (HCC)     2022    Shingles      Past Surgical History:   Procedure Laterality Date    ABSCESS DRAINAGE      left breast    BREAST BIOPSY      CHEST TUBE INSERTION      COLON SURGERY      colostomy    COLONOSCOPY      FL GUIDED CENTRAL VENOUS ACCESS DEVICE INSERTION  03/21/2023    FL RETROGRADE PYELOGRAM  01/23/2023    FL RETROGRADE PYELOGRAM  04/03/2023    FL RETROGRADE PYELOGRAM  7/21/2023    FL RETROGRADE PYELOGRAM  10/11/2023    FL RETROGRADE PYELOGRAM  12/15/2023    FL RETROGRADE PYELOGRAM  2/8/2024    IR BIOPSY LIVER MASS  05/09/2023    LUNG BIOPSY      PA CYSTO BLADDER W/URETERAL CATHETERIZATION Bilateral 07/21/2023    Procedure: CYSTOSCOPY URETEROSCOPY RETROGRADE PYELOGRAM WITH BILATERAL STENT URETERAL EXCHANGE; LEFT LASER LITHOTRIPSY AND STONE BASKET RETRIEVAL;  Surgeon: José Luis Stephens MD;  Location[facility];  Service: Urology    PA CYSTO BLADDER W/URETERAL CATHETERIZATION Bilateral 12/15/2023    Procedure: CYSTOSCOPY, BILATERAL  RETROGRADE PYELOGRAM , STENT EXCHANGE  RIGHT , LEFT URETEROSCOPY ,  HOLMIUM LASER WITH INSERTION LEFT URETERAL STENT;  Surgeon: Derick Bhakta MD;  Location[facility] MAIN [facility];  Service: Urology    IL CYSTO BLADDER W/URETERAL CATHETERIZATION Left 2/8/2024    Procedure: CYSTOSCOPY B/L RETROGRADE PYELOGRAM WITH B/L T URETERALSTENT EXCHANGE,LEFT URETEROSCOPY, DILATION LEFT URETERAL STICTURE;  Surgeon: José Luis Stephens MD;  Location: [facility] Main OR;  Service: Urology    IL CYSTO/URETERO W/LITHOTRIPSY &INDWELL STENT INSRT Bilateral 01/23/2023    Procedure: CYSTOSCOPY  RIGHT URETEROSCOPY WITH LITHOTRIPSY HOLMIUM LASER, BILATERAL RETROGRADE PYELOGRAM AND BILATERAL  URETERAL STENT INSERTION;  Surgeon: José Luis Stephens MD;  Location[facility];  Service: Urology    IL CYSTO/URETERO W/LITHOTRIPSY &INDWELL STENT INSRT Right 04/03/2023    Procedure: RIGHT URETEROSCOPY WITH LITHOTRIPSY HOLMIUM LASER, RETROGRADE PYELOGRAM; BILATERAL EXCHANGE STENT URETERAL;  Surgeon: José Luis Stephens MD;  [facility];  Service: Urology    IL CYSTO/URETERO W/LITHOTRIPSY &INDWELL STENT INSRT Bilateral 10/11/2023    Procedure: CYSTOSCOPY URETEROSCOPY RETROGRADE PYELOGRAM AND INSERTION STENT URETERAL DIAGNOSTINC RIGHT URETEROSCOPY, REMOVAL STENT LEFT SIDE;  Surgeon: José Luis Stephens MD;  [facility];  Service: Urology    IL INSJ TUNNELED CTR VAD W/SUBQ PORT AGE 5 YR/> N/A 03/21/2023    Procedure: INSERTION VENOUS PORT ( PORT-A-CATH) IR;  Surgeon: Mark Amso DO;  [facility];  Service: Interventional Radiology    IL LAPS COLECTOMY PRTL W/COLOPXTSTMY LW ANAST N/A 8/17/2023    Procedure: RESECTION COLON LOW ANTERIOR LAPAROSCOPIC WITH ROBOTICS;  Surgeon: Sree Umanzor MD;  Location: [facility];  Service: Colorectal    TONSILLECTOMY      URINARY SURGERY Bilateral     bilateral stents     Family History   Problem Relation Age of Onset    Cancer Mother     Cirrhosis Father     Breast cancer Neg Hx     Breast cancer additional onset Neg  Hx      Social History     Socioeconomic History    Marital status:      Spouse name: Not on file    Number of children: Not on file    Years of education: Not on file    Highest education level: Not on file   Occupational History    Not on file   Tobacco Use    Smoking status: Never     Passive exposure: Past    Smokeless tobacco: Never   Vaping Use    Vaping status: Never Used   Substance and Sexual Activity    Alcohol use: Never    Drug use: Never    Sexual activity: Not Currently   Other Topics Concern    Not on file   Social History Narrative    From 4/14/23  note:    Emergency Contact: MATEO TREVIZO (ex-) [phone] (Mobile)    Marital Status:     Caregiver/Support: ex- -Mateo and two dtrs.     Children: two dtrs- nataliia 21 in [place] and doretha 18 [facility]    Child/Elder care: no     Housing: two story house    Home Setup: one level    Lives With:  frankie and two dtrs    Daily Living Activities: independent    Durable Medical Equipment: No    Ambulation: independent    Employment: disabled-SSI-$100 and pension-$700     Status/Location: no     Ability to pay bills: yes. No barriers to paying monthly bills.      POA/LW/AD: no.  Aissatou Galindo is healthcare representative.  MSW emailed advance directive.          Social Determinants of Health     Financial Resource Strain: Low Risk  (11/30/2022)    [facility]    Financial Resource Strain     In the last 12 months, did you worry that your food could run out before you got money to buy more? : No   Food Insecurity: No Food Insecurity (8/18/2023)    Hunger Vital Sign     Worried About Running Out of Food in the Last Year: Never true     Ran Out of Food in the Last Year: Never true   Transportation Needs: No Transportation Needs (8/18/2023)    PRAPARE - Transportation     Lack of Transportation (Medical): No     Lack of Transportation (Non-Medical): No   Physical Activity:  Not on file   Stress: Not on file   Social Connections: Not on file   Intimate Partner Violence: Low Risk  (11/30/2022)    Received from [facility] [employer].    Violence     Does anyone in your life hurt you, threaten you, frighten you or make you feel unsafe?: No   Housing Stability: Unknown (8/18/2023)    Housing Stability Vital Sign     Unable to Pay for Housing in the Last Year: No     Number of Places Lived in the Last Year: Not on file     Unstable Housing in the Last Year: No       Current Outpatient Medications:     acetaminophen (TYLENOL) 325 mg tablet, Take 2 tablets (650 mg total) by mouth every 4 (four) hours as needed for mild pain, Disp: , Rfl:     acetaminophen (TYLENOL) 500 mg tablet, Take 2 tablets (1,000 mg total) by mouth 3 (three) times a day, Disp: 180 tablet, Rfl: 11    amLODIPine (NORVASC) 10 mg tablet, Take 1 tablet (10 mg total) by mouth daily, Disp: 90 tablet, Rfl: 3    cyanocobalamin (VITAMIN B-12) 1000 MCG tablet, Take 1,000 mcg by mouth daily, Disp: , Rfl:     docusate sodium (COLACE) 100 mg capsule, Take 1 capsule (100 mg total) by mouth 2 (two) times a day for 15 days, Disp: 30 capsule, Rfl: 0    fenofibrate (TRICOR) 145 mg tablet, Take 1 tablet (145 mg total) by mouth daily, Disp: 90 tablet, Rfl: 3    oxybutynin (DITROPAN) 5 mg tablet, Take 1 tablet (5 mg total) by mouth every 6 (six) hours as needed (bladder spasms), Disp: 30 tablet, Rfl: 0    tamsulosin (FLOMAX) 0.4 mg, Take 1 capsule (0.4 mg total) by mouth daily with dinner, Disp: 15 capsule, Rfl: 0    ursodiol (ACTIGALL) 300 mg capsule, Take 3 capsules (900 mg total) by mouth in the morning, Disp: 90 capsule, Rfl: 11    potassium chloride (MICRO-K) 10 MEQ CR capsule, Take 4 capsules (40 mEq total) by mouth 2 (two) times a day, Disp: 240 capsule, Rfl: 0  Allergies   Allergen Reactions    Oxaliplatin Shortness Of Breath     Reactions occurred with 2nd and 3rd infusions (about 1-3 hours from initiation of infusion) and  "required treatment with steroids and antihistamines. Please refer to allergy note on 2/7/2023 for detailed description of her reactions.    Morphine Nausea Only     \"Every dose made me nauseous\" (oral dosing only, can tolerate IV morphine)    Potassium Chloride Other (See Comments)     Pt reports \"burning\" with Iv potassium administration and wishes for it to be added to chart     Vitals:    03/18/24 1035   BP: 124/80   BP Location: Left arm   Patient Position: Sitting   Cuff Size: Standard   Pulse: 62   Resp: 18   Temp: 98.6 °F (37 °C)   TempSrc: Temporal   SpO2: 100%      Pain Score:   2  "

## 2024-03-19 ENCOUNTER — OFFICE VISIT (OUTPATIENT)
Dept: HEMATOLOGY ONCOLOGY | Facility: CLINIC | Age: 59
End: 2024-03-19
Payer: COMMERCIAL

## 2024-03-19 VITALS
DIASTOLIC BLOOD PRESSURE: 62 MMHG | HEIGHT: 62 IN | OXYGEN SATURATION: 100 % | HEART RATE: 86 BPM | BODY MASS INDEX: 26.5 KG/M2 | WEIGHT: 144 LBS | SYSTOLIC BLOOD PRESSURE: 108 MMHG | RESPIRATION RATE: 15 BRPM | TEMPERATURE: 98.1 F

## 2024-03-19 DIAGNOSIS — C18.7 MALIGNANT NEOPLASM OF SIGMOID COLON (HCC): Primary | ICD-10-CM

## 2024-03-19 PROCEDURE — 99215 OFFICE O/P EST HI 40 MIN: CPT | Performed by: INTERNAL MEDICINE

## 2024-03-19 NOTE — PROGRESS NOTES
Danielle Haque  1965  [address] [facility] [place]  [address]  [place] [id number]     DISCUSSION/SUMMARY:    58-year-old female previously diagnosed with rectal cancer. Problems:     stones.  Patient has had a number of admissions with hematuria, obstructions and pain.  Stents are in place, patient has left-sided hydro utero nephrosclerosis.  Patient is followed by urology.  Mrs. Haque recently noticed blood in her urine, this happens occasionally.  Patient understands that if it is progressive or persistent, she needs to call Dr. Stephens.    Sarcoidosis.  Patient also being followed by hepatology as well as pulmonary. On prednisone.     Rectal cancer.  Initial clinical stage from another institution was cT3 cN0 cM0.  Patient required upfront diverting procedure - reason not entirely clear (patient did not know), presumed pain, bleeding, obstruction.  Mrs. Haque apparently was able to tolerate the first cycle of CapeOX but developed a severe reaction on the second cycle.  There are notes in Care Everywhere where the plan at [facility] was to desensitize the patient to oxaliplatin.  This was not attempted at [facility].    Case previously discussed with Dr. Edouard Ferrera, radiation oncology.  The plan was to move forward with long course chemo RT as long as there was no evidence for metastases.  Patient tolerated the concurrent 5-FU with RT.  Patient then went on to surgery.    NCCN guidelines 4.2022 state that for patients with T3, N any with clear CRM by MRI, neoadjuvant options include the standard chemotherapy for 3 to 4 months, long course chemo RT and then restaging with evaluation for resection.  Another option includes upfront long course chemo RT with either capecitabine or infusion 5-FU.    Pathology results are listed below; patient was found to have ypT2 [2.5 cm] ypN0 [0/17] disease.  At this time, there is no clear evidence  for rectal cancer recurrence or progression.  There are pulmonary nodules but other than biopsying each nodule, it is impossible to determine if this is sarcoid or recurrence.  Recent CAT scan results are listed below.  ACE level is elevated, CEA level is minimally elevated.  The plan is to continue with surveillance.    Patent/ demonstrated very good understanding of the situation the concern is recurrent rectal cancer +/- sarcoid, possibly both.  Patient understands the reason for close continued surveillance.    Prior left breast abscess.  Etiology unclear.  Patient was placed on antibiotics.  This seems to have resolved.  Patient will continue to monitor.    Colostomy.  No recent GI problems, no GI bleeding, bowel movements are normal.  Patient will continue to monitor.    Pain control.  Patient states having pain control medications at home.    Patient is to return to the office in 8 weeks with repeat blood work before.    Carefully review your medication list and verify that the list is accurate and up-to-date. Please call the hematology/oncology office if there are medications missing from the list, medications on the list that you are not currently taking or if there is a dosage or instruction that is different from how you're taking that medication.    Patient goals and areas of care: Postoperative rectal cancer surveillance  Barriers to care: none  Patient is able to self-care  _____________________________________________________________________________________    Chief Complaint   Patient presents with    Follow-up    Hx Of Rectal Cancer     Oncology History   Malignant neoplasm of sigmoid colon (HCC)   10/28/2022 Biopsy    Colon, Rectosigmoid Colon Mass Biopsy (1 slide [id number] [facility], collected 10/28/2022):  - Adenocarcinoma arising in a tubular adenoma     12/1/2022 Biopsy    DIAGNOSIS LIVER, SEGMENT 3, RESECTION:  - HYALINIZED SUBCAPSULAR AND INTRA-PARENCHYMAL NODULES.  NO MALIGNANCY SEEN. SEE NOTE  - MILD MACROVESICULAR STEATOSIS (25%). NO EVIDENCE OF STEATOHEPATITIS. TRICHOME STAIN SHOWS MILD PORTAL FIBROSIS.   - 2+ IRON DEPOSITION WITHIN KUPFFER CELLS (IRON STAIN), CONSISTENT WITH SECONDARY HEMOCHROMATOSIS.     NOTE: THIS LIKELY REPRESENTS A MARKEDLY SCLEROTIC HEMANGIOMA OR CHANGES SECONDARY TO INJURY.               12/1/2022 Surgery    Laparoscopic diverting colostomy with liver biopsy. Dr. Serrano.      12/25/2022 Initial Diagnosis    Malignant neoplasm of sigmoid colon (HCC)     2022 - 1/10/2023 Chemotherapy    Oxaliplatin. [place], [place]     2/24/2023 -  Cancer Staged    Staging form: Colon and Rectum, AJCC 8th Edition  - Clinical: Stage IIA (cT3, cN0, cM0) - Signed by Edouard Ferrera MD on 2/24/2023  Total positive nodes: 0       4/17/2023 -  Chemotherapy    potassium chloride, 20 mEq, Intravenous, Once, 3 of 3 cycles  Administration: 20 mEq (4/17/2023), 20 mEq (4/24/2023), 20 mEq (5/1/2023)  alteplase (CATHFLO), 2 mg, Intracatheter, Every 1 Minute as needed, 6 of 6 cycles  fluorouracil (ADRUCIL) ambulatory infusion Soln, 225 mg/m2/day = 1,880 mg, Intravenous, Over 120 hours, 6 of 6 cycles     4/17/2023 - 5/25/2023 Radiation    Treatments:  Course: C1    Plan ID Energy Fractions Dose per Fraction (cGy) Dose Correction (cGy) Total Dose Delivered (cGy) Elapsed Days   CD Rectum 6X 3 / 3 180 0 540 2   Whole Pelvis 6X 25 / 25 180 0 4,500 35      Treatment Dates:  4/17/2023 - 5/25/2023.      Rectal cancer (HCC)   7/14/2023 Initial Diagnosis    Rectal cancer (HCC)     9/6/2023 -  Cancer Staged    Staging form: Colon and Rectum, AJCC 8th Edition  - Clinical: Stage IIA (cT3, cN0, cM0) - Signed by Edouard Ferrera MD on 9/6/2023  Total positive nodes: 0         History of Present Illness: 58-year-old female previously diagnosed with rectal adenocarcinoma recently moving into [place] and in need of a new medical oncologist.  Patient has a complicated cancer  history; turns for follow-up.    Patient was diagnosed with cT3 cN0 cM0 rectal adenocarcinoma in the [facility].  Although the specifics are not clear, patient required a diverting ileostomy.  Patient was then started on neoadjuvant CapeOx.  Patient was able to get through the first cycle but then had a reaction on the second cycle.  At that time, Mrs. Haque and her  decided to move to [place].  Patient was also treated with the Xeloda (amount not known) only but had not been treated for 2 months prior to being seen at [facility].  Patient also has a history of sarcoidosis, on steroids.    Options were previously discussed at length with Dr. Edouard Ferrera, radiation oncology and the plan was to proceed to concurrent chemoRT.  Patient received 5-FU with RT and then went on to surgery.    Mrs. Haque states feeling +/-.  Patient has had a number of kidney stones, hematuria, pain.  Patient is followed by urology, has stents in place.  Appetite is +/- but weight is stable.  No GI problems, no GI bleeding, colostomy is working well.  No shortness of breath or dyspnea on exertion, occasional chest pain, momentary.  No cough, sputum or hemoptysis.  No fevers or signs of infection.  Patient previously with a breast abscess status post antibiotics, this has resolved.    Review of Systems   Constitutional:  Positive for fatigue. Negative for appetite change, chills and fever.   Respiratory:  Negative for apnea, cough and shortness of breath.    Cardiovascular:  Negative for chest pain.   Genitourinary:  Negative for dysuria and frequency.   Musculoskeletal:  Negative for arthralgias and back pain.   Skin: Negative.    Neurological:  Negative for light-headedness and headaches.   Psychiatric/Behavioral:  Negative for sleep disturbance.    All other systems reviewed and are negative.    Patient Active Problem List   Diagnosis    Malignant neoplasm of sigmoid colon (HCC)    History of Clostridium  difficile colitis    Other hemochromatosis    Hypertension    Sarcoidosis    Impaired fasting glucose    Hypokalemia    Transaminitis    Hydronephrosis due to ureteral stricture    Adrenal nodule (HCC)    Other specified anemias    Nonrheumatic mitral valve regurgitation    Advance directive discussed with patient    Skin lesion    Abnormal CT of the chest    Kidney calculi    Dysuria    Chest pain    Creatinine elevation    Port-A-Cath in place    Drug-induced constipation    Palliative care patient    Cancer related pain    Rectal cancer (HCC)    Annual physical exam    Preop examination    Shingles    Partial small bowel obstruction (HCC)    Scalp pain    Left ear pain    Chronic anticoagulation    Neuralgia involving scalp    Anxiety about health    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    Ureteral stricture    Ureteral calculi with hydroureteronephrosis    Pulmonary nodules    Lump of skin    THOR (acute kidney injury) (HCC)     Past Medical History:   Diagnosis Date    Bleeding from colostomy stoma (HCC)     Cancer (HCC)     Colon cancer (HCC)     Fall     Headache     Hemochromatosis, unspecified     History of transfusion     2022 - no adverse reaction    Hypertension     Kidney calculi     Kidney stone     Liver disease     hemangioma    Muscle weakness     Personal history of COVID-19 12/2022    Sarcoidosis     Seizures (HCC)     2022    Shingles      Past Surgical History:   Procedure Laterality Date    ABSCESS DRAINAGE      left breast    BREAST BIOPSY      CHEST TUBE INSERTION      COLON SURGERY      colostomy    COLONOSCOPY      FL GUIDED CENTRAL VENOUS ACCESS DEVICE INSERTION  03/21/2023    FL RETROGRADE PYELOGRAM  01/23/2023    FL RETROGRADE PYELOGRAM  04/03/2023    FL RETROGRADE PYELOGRAM  7/21/2023    FL RETROGRADE PYELOGRAM  10/11/2023    FL RETROGRADE PYELOGRAM  12/15/2023    FL RETROGRADE PYELOGRAM  2/8/2024    IR BIOPSY LIVER MASS  05/09/2023    LUNG BIOPSY      CO CYSTO BLADDER  W/URETERAL CATHETERIZATION Bilateral 07/21/2023    Procedure: CYSTOSCOPY URETEROSCOPY RETROGRADE PYELOGRAM WITH BILATERAL STENT URETERAL EXCHANGE; LEFT LASER LITHOTRIPSY AND STONE BASKET RETRIEVAL;  Surgeon: José Luis Stephens MD;  [facility];  Service: Urology    KS CYSTO BLADDER W/URETERAL CATHETERIZATION Bilateral 12/15/2023    Procedure: CYSTOSCOPY, BILATERAL  RETROGRADE PYELOGRAM , STENT EXCHANGE RIGHT , LEFT URETEROSCOPY ,  HOLMIUM LASER WITH INSERTION LEFT URETERAL STENT;  Surgeon: Derick Bhakta MD;  Location[facility];  Service: Urology    KS CYSTO BLADDER W/URETERAL CATHETERIZATION Left 2/8/2024    Procedure: CYSTOSCOPY B/L RETROGRADE PYELOGRAM WITH B/L T URETERALSTENT EXCHANGE,LEFT URETEROSCOPY, DILATION LEFT URETERAL STICTURE;  Surgeon: José Lius Stephens MD;  [facility];  Service: Urology    KS CYSTO/URETERO W/LITHOTRIPSY &INDWELL STENT INSRT Bilateral 01/23/2023    Procedure: CYSTOSCOPY  RIGHT URETEROSCOPY WITH LITHOTRIPSY HOLMIUM LASER, BILATERAL RETROGRADE PYELOGRAM AND BILATERAL  URETERAL STENT INSERTION;  Surgeon: José Luis Stephens MD;  [facility];  Service: Urology    KS CYSTO/URETERO W/LITHOTRIPSY &INDWELL STENT INSRT Right 04/03/2023    Procedure: RIGHT URETEROSCOPY WITH LITHOTRIPSY HOLMIUM LASER, RETROGRADE PYELOGRAM; BILATERAL EXCHANGE STENT URETERAL;  Surgeon: José Luis Stephens MD;  [facility];  Service: Urology    KS CYSTO/URETERO W/LITHOTRIPSY &INDWELL STENT INSRT Bilateral 10/11/2023    Procedure: CYSTOSCOPY URETEROSCOPY RETROGRADE PYELOGRAM AND INSERTION STENT URETERAL DIAGNOSTINC RIGHT URETEROSCOPY, REMOVAL STENT LEFT SIDE;  Surgeon: José Luis Stephens MD;  [facility];  Service: Urology    KS INSJ TUNNELED CTR VAD W/SUBQ PORT AGE 5 YR/> N/A 03/21/2023    Procedure: INSERTION VENOUS PORT ( PORT-A-CATH) IR;  Surgeon: Mark Amos DO;  [facility];  Service: Interventional Radiology    KS LAPS  COLECTOMY PRTL W/COLOPXTSTMY LW ANAST N/A 8/17/2023    Procedure: RESECTION COLON LOW ANTERIOR LAPAROSCOPIC WITH ROBOTICS;  Surgeon: Sree Umanzor MD;  Location: [facility];  Service: Colorectal    TONSILLECTOMY      URINARY SURGERY Bilateral     bilateral stents     Family History   Problem Relation Age of Onset    Cancer Mother     Cirrhosis Father     Breast cancer Neg Hx     Breast cancer additional onset Neg Hx      Social History     Socioeconomic History    Marital status:      Spouse name: Not on file    Number of children: Not on file    Years of education: Not on file    Highest education level: Not on file   Occupational History    Not on file   Tobacco Use    Smoking status: Never     Passive exposure: Past    Smokeless tobacco: Never   Vaping Use    Vaping status: Never Used   Substance and Sexual Activity    Alcohol use: Never    Drug use: Never    Sexual activity: Not Currently   Other Topics Concern    Not on file   Social History Narrative    From 4/14/23  note:    Emergency Contact: ANAY TREVIZO (ex-) [phone] (Mobile)    Marital Status:     Caregiver/Support: ex- -Anay and two dtrs.     Children: two dtrs- nataliia 21 [facility] and doretha 18 [facility]    Child/Elder care: no     Housing: two story house    Home Setup: one level    Lives With:  -anay and two dtrs    Daily Living Activities: independent    Durable Medical Equipment: No    Ambulation: independent    Employment: disabled-SSI-$100 and pension-$700    [place] Status/Location: no     Ability to pay bills: yes. No barriers to paying monthly bills.      POA/LW/AD: no.  - Anay is healthcare representative.  MSW emailed advance directive.          Social Determinants of Health     Financial Resource Strain: Low Risk  (11/30/2022)    Received from [employer]    Financial Resource Strain     In the last 12 months, did you worry that your food could run out  before you got money to buy more? : No   Food Insecurity: No Food Insecurity (8/18/2023)    Hunger Vital Sign     Worried About Running Out of Food in the Last Year: Never true     Ran Out of Food in the Last Year: Never true   Transportation Needs: No Transportation Needs (8/18/2023)    PRAPARE - Transportation     Lack of Transportation (Medical): No     Lack of Transportation (Non-Medical): No   Physical Activity: Not on file   Stress: Not on file   Social Connections: Not on file   Intimate Partner Violence: Low Risk  (11/30/2022)    Received from [employer].    Violence     Does anyone in your life hurt you, threaten you, frighten you or make you feel unsafe?: No   Housing Stability: Unknown (8/18/2023)    Housing Stability Vital Sign     Unable to Pay for Housing in the Last Year: No     Number of Places Lived in the Last Year: Not on file     Unstable Housing in the Last Year: No       Current Outpatient Medications:     acetaminophen (TYLENOL) 325 mg tablet, Take 2 tablets (650 mg total) by mouth every 4 (four) hours as needed for mild pain, Disp: , Rfl:     acetaminophen (TYLENOL) 500 mg tablet, Take 2 tablets (1,000 mg total) by mouth 3 (three) times a day, Disp: 180 tablet, Rfl: 11    amLODIPine (NORVASC) 10 mg tablet, Take 1 tablet (10 mg total) by mouth daily, Disp: 90 tablet, Rfl: 3    cyanocobalamin (VITAMIN B-12) 1000 MCG tablet, Take 1,000 mcg by mouth daily, Disp: , Rfl:     fenofibrate (TRICOR) 145 mg tablet, Take 1 tablet (145 mg total) by mouth daily, Disp: 90 tablet, Rfl: 3    oxybutynin (DITROPAN) 5 mg tablet, Take 1 tablet (5 mg total) by mouth every 6 (six) hours as needed (bladder spasms), Disp: 30 tablet, Rfl: 0    tamsulosin (FLOMAX) 0.4 mg, Take 1 capsule (0.4 mg total) by mouth daily with dinner, Disp: 15 capsule, Rfl: 0    ursodiol (ACTIGALL) 300 mg capsule, Take 3 capsules (900 mg total) by mouth in the morning, Disp: 90 capsule, Rfl: 11    docusate sodium (COLACE)  "100 mg capsule, Take 1 capsule (100 mg total) by mouth 2 (two) times a day for 15 days, Disp: 30 capsule, Rfl: 0    potassium chloride (MICRO-K) 10 MEQ CR capsule, Take 4 capsules (40 mEq total) by mouth 2 (two) times a day, Disp: 240 capsule, Rfl: 0    Allergies   Allergen Reactions    Oxaliplatin Shortness Of Breath     Reactions occurred with 2nd and 3rd infusions (about 1-3 hours from initiation of infusion) and required treatment with steroids and antihistamines. Please refer to allergy note on 2/7/2023 for detailed description of her reactions.    Morphine Nausea Only     \"Every dose made me nauseous\" (oral dosing only, can tolerate IV morphine)    Potassium Chloride Other (See Comments)     Pt reports \"burning\" with Iv potassium administration and wishes for it to be added to chart       Vitals:    03/19/24 0959   BP: 108/62   Pulse: 86   Resp: 15   Temp: 98.1 °F (36.7 °C)   SpO2: 100%     Physical Exam  Constitutional:       Appearance: She is well-developed.   HENT:      Head: Normocephalic and atraumatic.   Cardiovascular:      Rate and Rhythm: Normal rate and regular rhythm.      Pulses: Normal pulses.      Heart sounds: Normal heart sounds.   Pulmonary:      Effort: Pulmonary effort is normal.      Breath sounds: Normal breath sounds.      Comments: Clear bilaterally  Abdominal:      General: Abdomen is flat.      Tenderness: There is no abdominal tenderness.      Comments: + Bowel sounds, left upper quadrant ostomy in place, nontender, distended, no fluid, no rigidity or rebound   Skin:     General: Skin is warm.      Comments: Good color, warm, moist, no petechiae or ecchymosis   Neurological:      Mental Status: She is alert.      Deep Tendon Reflexes: Reflexes are normal and symmetric.     Extremities: No adenopathy in the neck, supraclavicular chain, axilla bilaterally  Lymphatics: no adenopathy in the neck, supraclavicular region, axilla and groin bilaterally    Performance Status: 1 - Symptomatic " but completely ambulatory    Labs    3/7/2024 WBC = 7.03 hemoglobin = 11.8 hematocrit = 36.6 MCV = 82 RDW = 15.4 platelet = 302 neutrophil = 87% BUN = 41 creatinine = 1.70 alkaline phosphatase = 210 AST = 28 ALT = 47 total bilirubin = 0.55    3/7/2024 iron saturation = 7% iron = 48 TIBC = 721 ferritin = 40        3/7/2024 ACE = 100 (14-82 U/L)    Imaging    3/11/2024 CAT scan chest abdomen pelvis with contrast    IMPRESSION:     Persistent mild left-sided hydroureteronephrosis with stably positioned double-J ureteral stent in place. Left-sided nephrolithiasis.     Improved right-sided hydronephrosis with stably positioned double-J ureteral stent in place. Right-sided nephrolithiasis.     Essentially stable pulmonary nodules, majority of which are cavitary. Again, the differential diagnosis includes but is not limited to metastatic disease and cavitary pulmonary sarcoidosis. No mediastinal lymphadenopathy.     Spleen top normal in size with heterogeneous appearance and some nodularity, similar to prior, likely related to the patient's known history of sarcoidosis.      8/23/2023 CAT scan abdomen pelvis with contrast    IMPRESSION:     Multiple loops of abnormally dilated proximal to mid small bowel with relative collapse of the distal small bowel most compatible with small bowel obstruction. A clear transition point is not identified. Surgical consultation is advised.     A few scattered tiny foci of free intraperitoneal air are noted likely postoperative in nature in this patient with a history of recent low anterior colon resection with right mid abdominal ileostomy.     Small amount of intra-abdominal and pelvic ascites.     Stable position of bilateral ureteral stents with no significant hydronephrosis.    3/18/2023 CAT scan chest abdomen pelvis with contrast    IMPRESSION:     1.  Probably right upper lobe interstitial opacities are nonspecific and could be due to infection or inflammation of the appropriate  clinical context or fibrosis.  Malignancy is felt to be less likely but not excluded.  May consider short-term follow-up imaging in 2-3 months for reassessment.  2.  Consolidative changes in the left lower lobe and inferior lingula may be due to atelectasis.  3.  1.6 cm heterogeneous nodule the right lobe of the thyroid gland, recommend correlation with thyroid ultrasound if not obtained within the prior 6 months.  4.  There is an ill-defined area of hypoattenuation within the inferior right hepatic lobe of uncertain etiology.  Not appreciated on the previous exam.  Consider further assessment with contrast-enhanced MRI.  5.  Bilateral ureteral stents with calcifications suspected along the proximal right ureteral stent few scattered calcifications along the mid to distal left ureteral stent.  6.  Additional findings discussed above.       3/17/2023 MRI pelvics rectal cancer staging    IMPRESSION:  Unenhanced MRI of the Pelvis (Rectal Protocol)      SINCE MRI PELVIS RECTAL CANCER STAGING DATED 11/1/2022, POST TREATMENT PRIMARY TUMOR ASSESSMENT: Incomplete response (likely residual tumor.) Please note, though incomplete, there has been substantial tumor response to treatment with a significant   decrease in size of the high rectal cancer since MRI 11/1/2022.      SUSPICIOUS MESORECTAL LYMPH NODES: No.     SUSPICIOUS EXTRAMESORECTAL LYMPH NODES: No.       2/11/2023 CAT scan abdomen pelvis with contrast    IMPRESSION:     Interval decrease in right-sided hydronephrosis.     Thickening of the ascending colon and cecum which may represent colitis.  Follow-up with gastroenterology.     Stable 1 cm right adrenal gland nodule.Although its imaging features are indeterminate, it does not have suspicious imaging features (heterogeneity, necrosis, irregular margins), therefore this is likely benign, and can be followed by non-contrast abdomen CT or MRI in 12 months.  If patient has history of adrenal hyperfunction, consider  "biochemical evaluation.     Pathology    Case Report   Surgical Pathology Report                         Case: [id number]                                    Authorizing Provider:  Sree Umanzor MD      Collected:           08/17/2023 1020               Ordering Location:     [facility]      Received:            08/17/2023 1218                                      Hospital Operating Room                                                       Pathologist:           Lavinia Yusuf MD                                                          Specimens:   A) - Colon, Low Anterior Resection. Additional proximal colon \"Ostomy\"- Tattoo marks                 distal  (DMT)                                                                                        B) - Anastomatic site, Final Distal Rectal Margin (DMT)                                    Final Diagnosis   A. Rectosigmoid (low anterior resection):  - Adenocarcinoma arising in a tubular adenoma (2.5cm)  - Seventeen (17) lymph nodes negative for carcinoma (0/17)  - Margins negative for carcinoma   - See staging synoptic (ypT2N0)     Comment:  - MMR IHC was performed on the prior outside biopsy showing no loss of MMR IHC nuclear expression: Low risk of MSI-H.   - D2-40 and CD31 highlight vessels.      B. Colon, final distal margin (excision):  - No carcinoma identified     Interpretation performed at [facility]- [address] [address]      Amendment electronically signed by Lavinia Yusuf MD on 8/24/2023 at  7:22 AM   Electronically signed by Laivnia Yusuf MD on 8/24/2023 at  6:59 AM   Additional Information    All reported additional testing was performed with appropriately reactive controls.  These tests were developed and their performance characteristics determined by [facility] or appropriate performing facility, though some tests may be performed on tissues which have not been validated for performance " characteristics (such as staining performed on alcohol exposed cell blocks and decalcified tissues).  Results should be interpreted with caution and in the context of the patients’ clinical condition. These tests may not be cleared or approved by the U.S. Food and Drug Administration, though the FDA has determined that such clearance or approval is not necessary. These tests are used for clinical purposes and they should not be regarded as investigational or for research. This laboratory has been approved by [facility], designated as a high-complexity laboratory and is qualified to perform these tests.  .   Synoptic Checklist   COLON AND RECTUM: Resection, Including Transanal Disk Excision of Rectal Neoplasms  8th Edition - Protocol posted: 6/22/2022  COLON AND RECTUM: RESECTION - All Specimens  SPECIMEN   Procedure  Low anterior resection    Macroscopic Evaluation of Mesorectum  Near complete    TUMOR   Tumor Site  Rectosigmoid      Rectum    Rectal Tumor Location  Entirely above anterior peritoneal reflection    Histologic Type  Adenocarcinoma    Histologic Grade  G2, moderately differentiated    Tumor Size  Greatest dimension (Centimeters): 2.5 cm   Tumor Extent  Invades into muscularis propria    Macroscopic Tumor Perforation  Not identified    Lymphovascular Invasion  Not identified    Perineural Invasion  Not identified    Number of Tumor Buds  3 per 'hotspot' field   Tumor Bud Score  Low (0-4)    Type of Polyp in which Invasive Carcinoma Arose  Tubular adenoma    Treatment Effect  Present, with residual cancer showing evident tumor regression, but more than single cells or rare small groups of cancer cells (partial response, score 2)    MARGINS   Margin Status for Invasive Carcinoma  All margins negative for invasive carcinoma    Closest Margin(s) to Invasive Carcinoma  Radial (circumferential) or mesenteric    Distance from Invasive Carcinoma to Closest Margin  3.5 cm   Distance from Invasive Carcinoma to  Radial (Circumferential) Margin  Distance already reported as closest margin    Distance from Invasive Carcinoma to Distal Margin  Greater than 1 cm    Margin Status for Non-Invasive Tumor  All margins negative for high-grade dysplasia / intramucosal carcinoma and low-grade dysplasia    REGIONAL LYMPH NODES   Regional Lymph Node Status  All regional lymph nodes negative for tumor    Number of Lymph Nodes Examined  17    Tumor Deposits  Not identified    PATHOLOGIC STAGE CLASSIFICATION (pTNM, AJCC 8th Edition)   Reporting of pT, pN, and (when applicable) pM categories is based on information available to the pathologist at the time the report is issued. As per the AJCC (Chapter 1, 8th Ed.) it is the managing physician's responsibility to establish the final pathologic stage based upon all pertinent information, including but potentially not limited to this pathology report.   TNM Descriptors  y (post-treatment)    pT Category  pT2    pN Category  pN0    ADDITIONAL FINDINGS   Additional Findings  None identified               Case Report   Surgical Pathology Report                         Case: [id number]                                    Authorizing Provider:  Al Ferrera MD           Collected:           02/22/2023 0731               Ordering Location:     [facility]      Received:            02/22/2023 [facility]                                      Hospital Specialty                                                                                   Laboratory                                                                    Pathologist:           Moses Benedict MD                                                                  Specimen:    Colon, Rectosigmoid Colon Mass Biopsy (1 slide [id number] [facility], collected 10/28/2022)                                                              Final Diagnosis   A. Colon, Rectosigmoid Colon Mass Biopsy (1 slide [id number] [facility], collected 10/28/2022):  - Adenocarcinoma arising in a tubular adenoma.     Note: Per outside report (slides were not available for review).      RESULTS OF IMMUNOHISTOCHEMICAL ANALYSIS FOR MISMATCH REPAIR PROTEIN LOSS     RESULTS:  Antibody            Clone                 Description                     Results  MLH1                 M1                     Mismatch repair protein  Intact nuclear expression  MSH2                [id number]        Mismatch repair protein  Intact nuclear expression  MSH6                SP93                  Mismatch repair protein  Intact nuclear expression  PMS2                 A16-4                 Mismatch repair protein  Intact nuclear expression      Electronically signed by Moses Benedict MD on 2/22/2023 at 10:11 AM

## 2024-03-19 NOTE — PROGRESS NOTES
Follow-up - Radiation Oncology   Danielle Haque 1965 58 y.o. female [id number]      History of Present Illness   Cancer Staging   Malignant neoplasm of sigmoid colon (HCC)  Staging form: Colon and Rectum, AJCC 8th Edition  - Clinical: Stage IIA (cT3, cN0, cM0) - Signed by Edouard Ferrera MD on 2/24/2023  Total positive nodes: 0    Rectal cancer (HCC)  Staging form: Colon and Rectum, AJCC 8th Edition  - Clinical: Stage IIA (cT3, cN0, cM0) - Signed by Edouard Ferrera MD on 9/6/2023  Total positive nodes: 0    Ms. Danielle Haque is a 58 year old woman with a history of sarcoidosis who presented with Stage IIA (cT3N0) mid to high rectal adenocarcinoma. She was started on [place] in [place] and received 3 cycles with significant difficulty. She thereafter transferred her care to [facility] with decision to transition to chemoRT. On 5/25/23 she completed a course of neoadjuvant chemoRT to a dose of 5040 cGy in 28 fractions with concurrent 5-FU. She returns today for follow-up.      Interval History:  The patient was last seen in follow-up on 12/18/23, at that time with plan for IR referral for possible biopsy of the CHANDANA lung lesion.    The patient was thereafter evaluated by IR and was felt that on review the CHANDANA lesion was too small for biopsy and so this was deferred.  Her case was again discussed at multidisciplinary thoracic oncology tumor board with consensus for close observation with repeat CT chest in approximately 3 months.    CT C/A/P (3/11/24) demonstrated:  - Persistent mild left-sided hydroureteronephrosis with stably positioned double-J ureteral stent in place. Left-sided nephrolithiasis.  - Improved right-sided hydronephrosis with stably positioned double-J ureteral stent in place. Right-sided nephrolithiasis.  - Essentially stable pulmonary nodules, majority of which are cavitary. Again, the differential diagnosis includes but is not limited to metastatic disease and cavitary pulmonary  sarcoidosis. No mediastinal lymphadenopathy.  - Spleen top normal in size with heterogeneous appearance and some nodularity, similar to prior, likely related to the patient's known history of sarcoidosis.    Currently the patient is doing fair overall.  She has a multitude of ongoing symptoms including ongoing intermittent abdominal/pelvic pain related to her recurrent kidney stones and ureteral stents. She additionally has intermittent deep sharp thoracic pain. Her ostomy is stable and with no plans for reversal at this time.        Historical Information   Oncology History   Malignant neoplasm of sigmoid colon (HCC)   10/28/2022 Biopsy    Colon, Rectosigmoid Colon Mass Biopsy (1 slide [id number] [facility], collected 10/28/2022):  - Adenocarcinoma arising in a tubular adenoma     12/1/2022 Biopsy    DIAGNOSIS LIVER, SEGMENT 3, RESECTION:  - HYALINIZED SUBCAPSULAR AND INTRA-PARENCHYMAL NODULES. NO MALIGNANCY SEEN. SEE NOTE  - MILD MACROVESICULAR STEATOSIS (25%). NO EVIDENCE OF STEATOHEPATITIS. TRICHOME STAIN SHOWS MILD PORTAL FIBROSIS.   - 2+ IRON DEPOSITION WITHIN KUPFFER CELLS (IRON STAIN), CONSISTENT WITH SECONDARY HEMOCHROMATOSIS.     NOTE: THIS LIKELY REPRESENTS A MARKEDLY SCLEROTIC HEMANGIOMA OR CHANGES SECONDARY TO INJURY.               12/1/2022 Surgery    Laparoscopic diverting colostomy with liver biopsy. Dr. Serrano.      12/25/2022 Initial Diagnosis    Malignant neoplasm of sigmoid colon (HCC)     2022 - 1/10/2023 Chemotherapy    Oxaliplatin. [place], [place]     2/24/2023 -  Cancer Staged    Staging form: Colon and Rectum, AJCC 8th Edition  - Clinical: Stage IIA (cT3, cN0, cM0) - Signed by Edouard Ferrera MD on 2/24/2023  Total positive nodes: 0       4/17/2023 -  Chemotherapy    potassium chloride, 20 mEq, Intravenous, Once, 3 of 3 cycles  Administration: 20 mEq (4/17/2023), 20 mEq (4/24/2023), 20 mEq (5/1/2023)  alteplase (CATHFLO), 2 mg, Intracatheter, Every 1 Minute  as needed, 6 of 6 cycles  fluorouracil (ADRUCIL) ambulatory infusion Soln, 225 mg/m2/day = 1,880 mg, Intravenous, Over 120 hours, 6 of 6 cycles     4/17/2023 - 5/25/2023 Radiation    Treatments:  Course: C1    Plan ID Energy Fractions Dose per Fraction (cGy) Dose Correction (cGy) Total Dose Delivered (cGy) Elapsed Days   CD Rectum 6X 3 / 3 180 0 540 2   Whole Pelvis 6X 25 / 25 180 0 4,500 35      Treatment Dates:  4/17/2023 - 5/25/2023.      Rectal cancer (HCC)   7/14/2023 Initial Diagnosis    Rectal cancer (HCC)     9/6/2023 -  Cancer Staged    Staging form: Colon and Rectum, AJCC 8th Edition  - Clinical: Stage IIA (cT3, cN0, cM0) - Signed by Edouard Ferrera MD on 9/6/2023  Total positive nodes: 0           Past Medical History:   Diagnosis Date    Bleeding from colostomy stoma (HCC)     Cancer (HCC)     Colon cancer (HCC)     Fall     Headache     Hemochromatosis, unspecified     History of transfusion     2022 - no adverse reaction    Hypertension     Kidney calculi     Kidney stone     Liver disease     hemangioma    Muscle weakness     Personal history of COVID-19 12/2022    Sarcoidosis     Seizures (HCC)     2022    Shingles      Past Surgical History:   Procedure Laterality Date    ABSCESS DRAINAGE      left breast    BREAST BIOPSY      CHEST TUBE INSERTION      COLON SURGERY      colostomy    COLONOSCOPY      FL GUIDED CENTRAL VENOUS ACCESS DEVICE INSERTION  03/21/2023    FL RETROGRADE PYELOGRAM  01/23/2023    FL RETROGRADE PYELOGRAM  04/03/2023    FL RETROGRADE PYELOGRAM  7/21/2023    FL RETROGRADE PYELOGRAM  10/11/2023    FL RETROGRADE PYELOGRAM  12/15/2023    FL RETROGRADE PYELOGRAM  2/8/2024    IR BIOPSY LIVER MASS  05/09/2023    LUNG BIOPSY      AL CYSTO BLADDER W/URETERAL CATHETERIZATION Bilateral 07/21/2023    Procedure: CYSTOSCOPY URETEROSCOPY RETROGRADE PYELOGRAM WITH BILATERAL STENT URETERAL EXCHANGE; LEFT LASER LITHOTRIPSY AND STONE BASKET RETRIEVAL;  Surgeon: José Luis Stephens MD;   [facility];  Service: Urology    VA CYSTO BLADDER W/URETERAL CATHETERIZATION Bilateral 12/15/2023    Procedure: CYSTOSCOPY, BILATERAL  RETROGRADE PYELOGRAM , STENT EXCHANGE RIGHT , LEFT URETEROSCOPY ,  HOLMIUM LASER WITH INSERTION LEFT URETERAL STENT;  Surgeon: Derick Bhakta MD;  [facility];  Service: Urology    VA CYSTO BLADDER W/URETERAL CATHETERIZATION Left 2/8/2024    Procedure: CYSTOSCOPY B/L RETROGRADE PYELOGRAM WITH B/L T URETERALSTENT EXCHANGE,LEFT URETEROSCOPY, DILATION LEFT URETERAL STICTURE;  Surgeon: José Luis Stephens MD;  [facility];  Service: Urology    VA CYSTO/URETERO W/LITHOTRIPSY &INDWELL STENT INSRT Bilateral 01/23/2023    Procedure: CYSTOSCOPY  RIGHT URETEROSCOPY WITH LITHOTRIPSY HOLMIUM LASER, BILATERAL RETROGRADE PYELOGRAM AND BILATERAL  URETERAL STENT INSERTION;  Surgeon: José Luis Stephens MD;  [facility];  Service: Urology    VA CYSTO/URETERO W/LITHOTRIPSY &INDWELL STENT INSRT Right 04/03/2023    Procedure: RIGHT URETEROSCOPY WITH LITHOTRIPSY HOLMIUM LASER, RETROGRADE PYELOGRAM; BILATERAL EXCHANGE STENT URETERAL;  Surgeon: José Luis Stephens MD;  [facility];  Service: Urology    VA CYSTO/URETERO W/LITHOTRIPSY &INDWELL STENT INSRT Bilateral 10/11/2023    Procedure: CYSTOSCOPY URETEROSCOPY RETROGRADE PYELOGRAM AND INSERTION STENT URETERAL DIAGNOSTINC RIGHT URETEROSCOPY, REMOVAL STENT LEFT SIDE;  Surgeon: José Luis Stephens MD;  [facility];  Service: Urology    VA INSJ TUNNELED CTR VAD W/SUBQ PORT AGE 5 YR/> N/A 03/21/2023    Procedure: INSERTION VENOUS PORT ( PORT-A-CATH) IR;  Surgeon: Mark Amos DO;  [facility];  Service: Interventional Radiology    VA LAPS COLECTOMY PRTL W/COLOPXTSTMY LW ANAST N/A 8/17/2023    Procedure: RESECTION COLON LOW ANTERIOR LAPAROSCOPIC WITH ROBOTICS;  Surgeon: Sree Umanzor MD;  [facility];  Service: Colorectal    TONSILLECTOMY      URINARY  SURGERY Bilateral     bilateral stents       Social History   Social History     Substance and Sexual Activity   Alcohol Use Never     Social History     Substance and Sexual Activity   Drug Use Never     Social History     Tobacco Use   Smoking Status Never    Passive exposure: Past   Smokeless Tobacco Never         Meds/Allergies     Current Outpatient Medications:     acetaminophen (TYLENOL) 325 mg tablet, Take 2 tablets (650 mg total) by mouth every 4 (four) hours as needed for mild pain, Disp: , Rfl:     acetaminophen (TYLENOL) 500 mg tablet, Take 2 tablets (1,000 mg total) by mouth 3 (three) times a day, Disp: 180 tablet, Rfl: 11    amLODIPine (NORVASC) 10 mg tablet, Take 1 tablet (10 mg total) by mouth daily, Disp: 90 tablet, Rfl: 3    cyanocobalamin (VITAMIN B-12) 1000 MCG tablet, Take 1,000 mcg by mouth daily, Disp: , Rfl:     docusate sodium (COLACE) 100 mg capsule, Take 1 capsule (100 mg total) by mouth 2 (two) times a day for 15 days, Disp: 30 capsule, Rfl: 0    fenofibrate (TRICOR) 145 mg tablet, Take 1 tablet (145 mg total) by mouth daily, Disp: 90 tablet, Rfl: 3    oxybutynin (DITROPAN) 5 mg tablet, Take 1 tablet (5 mg total) by mouth every 6 (six) hours as needed (bladder spasms), Disp: 30 tablet, Rfl: 0    tamsulosin (FLOMAX) 0.4 mg, Take 1 capsule (0.4 mg total) by mouth daily with dinner, Disp: 15 capsule, Rfl: 0    ursodiol (ACTIGALL) 300 mg capsule, Take 3 capsules (900 mg total) by mouth in the morning, Disp: 90 capsule, Rfl: 11    potassium chloride (MICRO-K) 10 MEQ CR capsule, Take 4 capsules (40 mEq total) by mouth 2 (two) times a day, Disp: 240 capsule, Rfl: 0  Allergies   Allergen Reactions    Oxaliplatin Shortness Of Breath     Reactions occurred with 2nd and 3rd infusions (about 1-3 hours from initiation of infusion) and required treatment with steroids and antihistamines. Please refer to allergy note on 2/7/2023 for detailed description of her reactions.    Morphine Nausea Only      "\"Every dose made me nauseous\" (oral dosing only, can tolerate IV morphine)    Potassium Chloride Other (See Comments)     Pt reports \"burning\" with Iv potassium administration and wishes for it to be added to chart     Review of Systems   Constitutional:  Positive for fatigue.   HENT:  Positive for nosebleeds (Intermittent).    Eyes: Negative.    Respiratory: Negative.     Cardiovascular: Negative.    Gastrointestinal:  Positive for rectal pain. Negative for constipation and diarrhea.   Endocrine: Negative.    Genitourinary:  Positive for dysuria, hematuria (Occasional) and vaginal pain.   Musculoskeletal:         Intense rectal pain x 2 in the last 2 weeks   Allergic/Immunologic: Negative.    Neurological: Negative.    Hematological: Negative.    Psychiatric/Behavioral: Negative.          OBJECTIVE:   /80 (BP Location: Left arm, Patient Position: Sitting, Cuff Size: Standard)   Pulse 62   Temp 98.6 °F (37 °C) (Temporal)   Resp 18   SpO2 100%   Pain Assessment:  0  ECOG/Zubrod/WHO: 1 - Symptomatic but completely ambulatory    Physical Exam   Well appearing. NAD.   No increased work of breathing.   Extremities warm and well perfused.     RESULTS    Lab Results:   Recent Results (from the past 672 hour(s))   CBC and differential    Collection Time: 03/02/24 12:52 AM   Result Value Ref Range    WBC 9.41 4.31 - 10.16 Thousand/uL    RBC 4.10 3.81 - 5.12 Million/uL    Hemoglobin 10.8 (L) 11.5 - 15.4 g/dL    Hematocrit 34.1 (L) 34.8 - 46.1 %    MCV 83 82 - 98 fL    MCH 26.3 (L) 26.8 - 34.3 pg    MCHC 31.7 31.4 - 37.4 g/dL    RDW 15.9 (H) 11.6 - 15.1 %    MPV 8.9 8.9 - 12.7 fL    Platelets 216 149 - 390 Thousands/uL    nRBC 0 /100 WBCs    Neutrophils Relative 89 (H) 43 - 75 %    Immature Grans % 0 0 - 2 %    Lymphocytes Relative 4 (L) 14 - 44 %    Monocytes Relative 6 4 - 12 %    Eosinophils Relative 1 0 - 6 %    Basophils Relative 0 0 - 1 %    Neutrophils Absolute 8.31 (H) 1.85 - 7.62 Thousands/µL    Absolute " Immature Grans 0.03 0.00 - 0.20 Thousand/uL    Absolute Lymphocytes 0.41 (L) 0.60 - 4.47 Thousands/µL    Absolute Monocytes 0.60 0.17 - 1.22 Thousand/µL    Eosinophils Absolute 0.05 0.00 - 0.61 Thousand/µL    Basophils Absolute 0.01 0.00 - 0.10 Thousands/µL   Comprehensive metabolic panel    Collection Time: 03/02/24 12:52 AM   Result Value Ref Range    Sodium 137 135 - 147 mmol/L    Potassium 4.5 3.5 - 5.3 mmol/L    Chloride 105 96 - 108 mmol/L    CO2 25 21 - 32 mmol/L    ANION GAP 7 mmol/L    BUN 48 (H) 5 - 25 mg/dL    Creatinine 1.75 (H) 0.60 - 1.30 mg/dL    Glucose 140 65 - 140 mg/dL    Calcium 10.0 8.4 - 10.2 mg/dL    AST 32 13 - 39 U/L    ALT 57 (H) 7 - 52 U/L    Alkaline Phosphatase 188 (H) 34 - 104 U/L    Total Protein 6.8 6.4 - 8.4 g/dL    Albumin 4.0 3.5 - 5.0 g/dL    Total Bilirubin 0.65 0.20 - 1.00 mg/dL    eGFR 32 ml/min/1.73sq m   Lipase    Collection Time: 03/02/24 12:52 AM   Result Value Ref Range    Lipase 28 11 - 82 u/L   CK    Collection Time: 03/02/24 12:52 AM   Result Value Ref Range    Total CK 34 26 - 192 U/L   UA w Reflex to Microscopic w Reflex to Culture    Collection Time: 03/02/24  3:10 AM    Specimen: Urine, Clean Catch   Result Value Ref Range    Color, UA Light Orange     Clarity, UA Turbid     Specific Gravity, UA 1.015 1.003 - 1.030    pH, UA 5.5 4.5, 5.0, 5.5, 6.0, 6.5, 7.0, 7.5, 8.0    Leukocytes, UA Moderate (A) Negative    Nitrite, UA Negative Negative    Protein, UA 30 (1+) (A) Negative mg/dl    Glucose, UA Negative Negative mg/dl    Ketones, UA Negative Negative mg/dl    Urobilinogen, UA <2.0 <2.0 mg/dl mg/dl    Bilirubin, UA Negative Negative    Occult Blood, UA Large (A) Negative   Urine Microscopic    Collection Time: 03/02/24  3:10 AM   Result Value Ref Range    RBC, UA Innumerable (A) None Seen, 1-2 /hpf    WBC, UA 30-50 (A) None Seen, 1-2 /hpf    Epithelial Cells None Seen None Seen, Occasional /hpf    Bacteria, UA Occasional None Seen, Occasional /hpf    MUCUS THREADS  Moderate (A) None Seen    Budding Yeast Present    Urine culture    Collection Time: 03/02/24  3:10 AM    Specimen: Urine, Clean Catch   Result Value Ref Range    Urine Culture 10,000-19,000 cfu/ml    Protime-INR    Collection Time: 03/02/24  3:57 AM   Result Value Ref Range    Protime 14.1 11.6 - 14.5 seconds    INR 1.03 0.84 - 1.19   APTT    Collection Time: 03/02/24  3:57 AM   Result Value Ref Range    PTT 27 23 - 37 seconds   Blood culture #2    Collection Time: 03/02/24  3:57 AM    Specimen: Arm, Left; Blood   Result Value Ref Range    Blood Culture No Growth After 5 Days.    Lactic acid, plasma (w/reflex if result > 2.0)    Collection Time: 03/02/24  3:57 AM   Result Value Ref Range    LACTIC ACID 0.8 0.5 - 2.0 mmol/L   CBC (With Platelets)    Collection Time: 03/02/24  4:04 AM   Result Value Ref Range    WBC 7.70 4.31 - 10.16 Thousand/uL    RBC 3.62 (L) 3.81 - 5.12 Million/uL    Hemoglobin 9.6 (L) 11.5 - 15.4 g/dL    Hematocrit 30.3 (L) 34.8 - 46.1 %    MCV 84 82 - 98 fL    MCH 26.5 (L) 26.8 - 34.3 pg    MCHC 31.7 31.4 - 37.4 g/dL    RDW 15.9 (H) 11.6 - 15.1 %    Platelets 178 149 - 390 Thousands/uL    MPV 8.8 (L) 8.9 - 12.7 fL   Comprehensive metabolic panel    Collection Time: 03/02/24  4:04 AM   Result Value Ref Range    Sodium 139 135 - 147 mmol/L    Potassium 3.6 3.5 - 5.3 mmol/L    Chloride 111 (H) 96 - 108 mmol/L    CO2 21 21 - 32 mmol/L    ANION GAP 7 mmol/L    BUN 44 (H) 5 - 25 mg/dL    Creatinine 1.56 (H) 0.60 - 1.30 mg/dL    Glucose 129 65 - 140 mg/dL    Calcium 8.6 8.4 - 10.2 mg/dL    Corrected Calcium 9.2 8.3 - 10.1 mg/dL    AST 26 13 - 39 U/L    ALT 45 7 - 52 U/L    Alkaline Phosphatase 152 (H) 34 - 104 U/L    Total Protein 5.6 (L) 6.4 - 8.4 g/dL    Albumin 3.3 (L) 3.5 - 5.0 g/dL    Total Bilirubin 0.55 0.20 - 1.00 mg/dL    eGFR 36 ml/min/1.73sq m   CBC and differential    Collection Time: 03/07/24  9:46 AM   Result Value Ref Range    WBC 7.03 4.31 - 10.16 Thousand/uL    RBC 4.45 3.81 - 5.12  Million/uL    Hemoglobin 11.8 11.5 - 15.4 g/dL    Hematocrit 36.6 34.8 - 46.1 %    MCV 82 82 - 98 fL    MCH 26.5 (L) 26.8 - 34.3 pg    MCHC 32.2 31.4 - 37.4 g/dL    RDW 15.4 (H) 11.6 - 15.1 %    MPV 9.5 8.9 - 12.7 fL    Platelets 302 149 - 390 Thousands/uL    nRBC 0 /100 WBCs    Neutrophils Relative 87 (H) 43 - 75 %    Immature Grans % 0 0 - 2 %    Lymphocytes Relative 6 (L) 14 - 44 %    Monocytes Relative 5 4 - 12 %    Eosinophils Relative 2 0 - 6 %    Basophils Relative 0 0 - 1 %    Neutrophils Absolute 5.99 1.85 - 7.62 Thousands/µL    Absolute Immature Grans 0.03 0.00 - 0.20 Thousand/uL    Absolute Lymphocytes 0.45 (L) 0.60 - 4.47 Thousands/µL    Absolute Monocytes 0.37 0.17 - 1.22 Thousand/µL    Eosinophils Absolute 0.17 0.00 - 0.61 Thousand/µL    Basophils Absolute 0.02 0.00 - 0.10 Thousands/µL   Comprehensive metabolic panel    Collection Time: 03/07/24  9:46 AM   Result Value Ref Range    Sodium 140 135 - 147 mmol/L    Potassium 4.4 3.5 - 5.3 mmol/L    Chloride 109 (H) 96 - 108 mmol/L    CO2 22 21 - 32 mmol/L    ANION GAP 9 mmol/L    BUN 41 (H) 5 - 25 mg/dL    Creatinine 1.70 (H) 0.60 - 1.30 mg/dL    Glucose, Fasting 99 65 - 99 mg/dL    Calcium 10.0 8.4 - 10.2 mg/dL    AST 28 13 - 39 U/L    ALT 47 7 - 52 U/L    Alkaline Phosphatase 210 (H) 34 - 104 U/L    Total Protein 7.1 6.4 - 8.4 g/dL    Albumin 4.4 3.5 - 5.0 g/dL    Total Bilirubin 0.55 0.20 - 1.00 mg/dL    eGFR 33 ml/min/1.73sq m   Protime-INR    Collection Time: 03/07/24  9:46 AM   Result Value Ref Range    Protime 13.4 11.6 - 14.5 seconds    INR 1.03 0.84 - 1.19   Sedimentation rate, automated    Collection Time: 03/07/24  9:46 AM   Result Value Ref Range    Sed Rate 49 (H) 0 - 29 mm/hour   C-reactive protein    Collection Time: 03/07/24  9:46 AM   Result Value Ref Range    CRP 12.6 (H) <3.0 mg/L   Angiotensin converting enzyme    Collection Time: 03/07/24  9:46 AM   Result Value Ref Range    Angio Convert Enzyme 100 (H) 14 - 82 U/L   CEA    Collection  Time: 03/07/24  9:46 AM   Result Value Ref Range    CEA 4.9 (H) 0.0 - 3.0 ng/mL   Urine culture    Collection Time: 03/07/24  9:46 AM    Specimen: Urine, Clean Catch   Result Value Ref Range    Urine Culture 50,000-59,000 cfu/ml    Gamma GT    Collection Time: 03/07/24  9:46 AM   Result Value Ref Range     (H) 9 - 64 U/L   IgG, IgA, IgM    Collection Time: 03/07/24  9:46 AM   Result Value Ref Range     66 - 433 mg/dL     635 - 1,741 mg/dL    IGM 40 (L) 45 - 281 mg/dL   TIBC Panel (incl. Iron, TIBC, % Iron Saturation)    Collection Time: 03/07/24  9:46 AM   Result Value Ref Range    Iron Saturation 7 (L) 15 - 50 %    TIBC 721 (H) 250 - 450 ug/dL    Iron 48 (L) 50 - 212 ug/dL    UIBC 673 (H) 155 - 355 ug/dL   Ferritin    Collection Time: 03/07/24  9:46 AM   Result Value Ref Range    Ferritin 40 11 - 307 ng/mL       Imaging Studies:FL retrograde pyelogram    Result Date: 12/16/2023  Narrative: BILATERAL RETROGRADE PYELOGRAM INDICATION:   Ureteral calculi. COMPARISON: 10/11/2023 IMAGES:  10 FLUOROSCOPY TIME:  27 SECONDS CONTRAST: 26 mL of iohexol (OMNIPAQUE) FINDINGS: Right ureteral stent exchange. Stone treatment on the left with insertion of the ureteral catheter. Osseous and soft tissue detail limited by technique. Ostomy device in the right lower quadrant.     Impression: Fluoroscopic guidance provided for bilateral retrograde pyelogram. Please see procedure report for further details. Workstation performed: [id number]     CT renal stone study abdomen pelvis without contrast    Result Date: 12/14/2023  Narrative: CT ABDOMEN AND PELVIS WITHOUT IV CONTRAST - LOW DOSE RENAL STONE INDICATION:   pelvic pain. History of malignant neoplasm of the rectum COMPARISON: CT chest, abdomen, and pelvis dated 12/11/2023 TECHNIQUE:  Low radiation dose thin section CT examination of the abdomen and pelvis was performed without intravenous or oral contrast according to a protocol specifically designed to evaluate  for urinary tract calculus.  Axial, sagittal, and coronal 2D  reformatted images were created from the source data and submitted for interpretation.  Evaluation for pathology in the abdomen and pelvis that is unrelated to urinary tract calculi is limited. Radiation dose length product (DLP) for this visit:  131 mGy-cm .  This examination, like all CT scans performed in the [facility], was performed utilizing techniques to minimize radiation dose exposure, including the use of iterative reconstruction and automated exposure control. URINARY TRACT FINDINGS: RIGHT KIDNEY AND URETER: Multiple subcentimeter nonobstructing stones in the right kidney. Right-sided nephroureteral stent in place. Kadi stent calcifications along the proximal portion redemonstrated. There is moderate right hydroureteronephrosis, this  appears intervally improved when compared to the prior. Right kidney otherwise appears grossly unremarkable. LEFT KIDNEY AND URETER: Several subcentimeter nonobstructing stones in the left kidney. Again seen is an obstructing stone in the mid/distal left ureter which measures approximately 0.5 x 0.5 cm in size. There is moderate left hydroureteronephrosis noted, similar to the prior. URINARY BLADDER: Right-sided nephroureteral stent terminates in the bladder; otherwise grossly unremarkable. ADDITIONAL FINDINGS: LOWER CHEST: Scarring/atelectasis in the left lower lung field. Several subcentimeter pulmonary nodules are redemonstrated, as before, better described on recent prior CT of the chest, abdomen, and pelvis. SOLID VISCERA: Benign-appearing subcentimeter nodule in the right adrenal gland. Limited low radiation dose noncontrast CT evaluation demonstrates no clinically significant abnormality of the imaged portions of the liver, spleen, pancreas, or adrenal glands. GALLBLADDER/BILIARY TREE:  No calcified gallstones. No pericholecystic inflammatory change.  No biliary dilatation. STOMACH AND  BOWEL: Postsurgical changes in the rectum. Relatively underdistended colon. Loop ileostomy in the right lower quadrant no discrete evidence of bowel obstruction. APPENDIX:  No findings to suggest appendicitis. ABDOMINOPELVIC CAVITY:  No ascites.  No pneumoperitoneum.  No lymphadenopathy. VESSELS: Mild atherosclerosis; no aortic aneurysm. REPRODUCTIVE ORGANS:  Unremarkable for patient's age. ABDOMINAL WALL/INGUINAL REGIONS: Scarring in the anterior abdominal wall. OSSEOUS STRUCTURES:  No acute fracture or destructive osseous lesion.     Impression: Bilateral nephrolithiasis. 5 mm obstructing stone in the mid/distal left ureter with moderate left hydroureteronephrosis, as before. Right-sided nephroureteral stent in place. Moderate right hydroureteronephrosis which appears intervally improved when compared to the prior. Postsurgical changes as described with right-sided ileostomy. No discrete evidence of bowel obstruction. Several subcentimeter pulmonary nodules are redemonstrated, as before, suspicious for pulmonary metastatic disease. Better described on recent prior CT of the chest, abdomen, and pelvis. Other findings as above. Workstation performed: [id number]     CT chest abdomen pelvis w contrast    Result Date: 12/14/2023  Narrative: CT CHEST, ABDOMEN AND PELVIS WITH IV CONTRAST INDICATION:   C20: Malignant neoplasm of rectum. COMPARISON: CT abdomen/pelvis dated 8/23/2023. CT chest/abdomen/pelvis dated 6/15/2023. TECHNIQUE: CT examination of the chest, abdomen and pelvis was performed. Multiplanar 2D reformatted images were created from the source data. This examination, like all CT scans performed in the [facility], was performed utilizing techniques to minimize radiation dose exposure, including the use of iterative reconstruction and automated exposure control. Radiation dose length product (DLP) for this visit:  914 mGy-cm IV Contrast:  100 mL of iohexol (OMNIPAQUE) Enteric Contrast: Enteric  contrast was administered. FINDINGS: CHEST LUNGS: There are multiple new/enlarging pulmonary nodules worrisome for metastatic disease. Index lesions are as follows: 1.5 x 1.3 cm spiculated left upper lobe nodule (image 138, series 4), previously 1.2 x 0.8 cm 3 mm cavitary right lower lobe nodule (image 141, series 4), new 6 mm cavitary right lower lobe nodule (image 1666, series 4), new 4 mm cavitary left lower lobe nodule (image 168, series 4), new There is no tracheal or endobronchial lesion. PLEURA:  Unremarkable. HEART/GREAT VESSELS: Heart is unremarkable for patient's age.  No thoracic aortic aneurysm. MEDIASTINUM AND DUSTY:  Unremarkable. CHEST WALL AND LOWER NECK: Mild nodularity of the right thyroid lobe, similar to prior. Correlate with thyroid ultrasound findings/recommendations dated 3/25/2023. ABDOMEN LIVER/BILIARY TREE:  Unremarkable. GALLBLADDER:  No calcified gallstones. No pericholecystic inflammatory change. SPLEEN: The spleen is top normal in size measuring up to 12 cm and is mildly heterogeneous with some nodularity, similar to prior. This is likely related to the patient's known history of sarcoidosis. PANCREAS:  Unremarkable. ADRENAL GLANDS: Stable 7 mm right adrenal gland nodule (image 110, series 2). KIDNEYS/URETERS: There is new moderate to severe right-sided hydroureteronephrosis to the level of the proximal ureter with stably positioned indwelling ureteral stent, the proximal portion of which is probably encrusted, similar to prior. There is also new moderate to severe left-sided hydroureteronephrosis to the level of a 6 mm obstructing calculus in the distal ureter (image 178, series 2). The left ureteral stent has been removed in the interim. Multiple nonobstructing bilateral intrarenal calculi measuring up to 6 mm on the left. STOMACH AND BOWEL: Stable postsurgical changes of low anterior resection of the colon. Right mid anterior abdominal ileostomy. APPENDIX:  No findings to suggest  appendicitis. ABDOMINOPELVIC CAVITY:  No ascites.  No pneumoperitoneum. Mildly prominent retroperitoneal lymph nodes are similar to prior, nonspecific. VESSELS:  Unremarkable for patient's age. PELVIS REPRODUCTIVE ORGANS:  Unremarkable for patient's age. URINARY BLADDER:  Unremarkable. ABDOMINAL WALL/INGUINAL REGIONS:  Unremarkable. OSSEOUS STRUCTURES:  No acute fracture or destructive osseous lesion.     Impression: New moderate to severe left-sided hydroureteronephrosis to the level of a 6 mm obstructing distal ureteral calculus. The left ureteral stent has been removed in the interim. New moderate to severe right-sided hydroureteronephrosis to the level of the proximal ureter with stably positioned indwelling ureteral stent, the proximal portion of which is probably encrusted, similar to prior. Bilateral nephrolithiasis. Findings highly worrisome for pulmonary metastatic disease as evidenced by interval development of multiple new/enlarging pulmonary nodules, many of which are cavitary, in this patient with history of rectal adenocarcinoma. Cavitary pulmonary sarcoidosis  is considered less likely as it is very uncommon and is usually reported in those with severe and active disease. Spleen remains top normal in size with heterogeneous appearance and some degree of nodularity, similar to prior, likely related to the patient's known history of sarcoidosis. The study was marked in EPIC for immediate notification. Workstation performed: [id number]           Assessment/Plan:  Orders Placed This Encounter   Procedures    CT chest abdomen pelvis w contrast      We reviewed the patient's recent repeat CT chest/abdomen/pelvis in comparison to pretreatment imaging.  On my review this demonstrates mild/gradual enlargement of her multiple cavitary bilateral pulmonary nodules which previously were felt to be consistent with either granulomatous sarcoid or metastatic disease.  Her other, largest left upper lobe nodule, which  "appears different in character, is stable.  Of note, the patient's CEA has risen up to 4.5.    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  Very gradual increase in size, I recommend repeat CT chest abdomen pelvis in 3 months for reevaluation.  At that time if any nodules appear large enough for biopsy this can be pursued.  Ultimately, I would not favor proceeding with additional antineoplastic therapy until recurrence or progression of disease is confirmed.    In the meantime, I will reach out to her colorectal surgeon to determine what plans they may have for possible reanastomosis and ostomy takedown.  I will also reach out to rheumatology (who she saw in 7/2023) to see if any additional sarcoid based therapy is warranted. I will plan to see her back in 3 months for continued follow-up.     Edouard Ferrera MD  3/19/2024,2:23 PM    Total time spent reviewing EMR, seeing patient in clinic visit, documenting visit, placing treatment orders, and communicating with other medical providers on date of visit: 46 minutes.       Portions of the record may have been created with voice recognition software.  Occasional wrong word or \"sound a like\" substitutions may have occurred due to the inherent limitations of voice recognition software.  Read the chart carefully and recognize, using context, where substitutions have occurred.        "

## 2024-03-25 ENCOUNTER — OFFICE VISIT (OUTPATIENT)
Dept: UROLOGY | Facility: CLINIC | Age: 59
End: 2024-03-25
Payer: COMMERCIAL

## 2024-03-25 VITALS
WEIGHT: 143 LBS | HEART RATE: 85 BPM | DIASTOLIC BLOOD PRESSURE: 64 MMHG | BODY MASS INDEX: 26.31 KG/M2 | HEIGHT: 62 IN | SYSTOLIC BLOOD PRESSURE: 118 MMHG | OXYGEN SATURATION: 99 %

## 2024-03-25 DIAGNOSIS — C18.7 MALIGNANT NEOPLASM OF SIGMOID COLON (HCC): Primary | ICD-10-CM

## 2024-03-25 DIAGNOSIS — Z01.818 PRE-OP TESTING: ICD-10-CM

## 2024-03-25 DIAGNOSIS — N13.39 OTHER HYDRONEPHROSIS: ICD-10-CM

## 2024-03-25 PROCEDURE — 87086 URINE CULTURE/COLONY COUNT: CPT | Performed by: PHYSICIAN ASSISTANT

## 2024-03-25 PROCEDURE — 99213 OFFICE O/P EST LOW 20 MIN: CPT | Performed by: PHYSICIAN ASSISTANT

## 2024-03-25 RX ORDER — PHENAZOPYRIDINE HYDROCHLORIDE 200 MG/1
200 TABLET, FILM COATED ORAL 3 TIMES DAILY PRN
Qty: 30 TABLET | Refills: 3 | Status: SHIPPED | OUTPATIENT
Start: 2024-03-25

## 2024-03-25 NOTE — PROGRESS NOTES
HISTORY AND PHYSICAL       Patient Identifiers: Danielle Haque (MRN: [id number])    Urology, Sherman Garcia PA-C  Date of Service: 3/25/2024    History of Present Illness:     Danielle Haque is a 58 y.o. female with history of complex nephrolithiasis complex ureteral stricture disease as well as advanced rectal cancer.  She has bilateral stents in place chronically.  She did have a trial of removal of her left stent which has now been replaced.  She has some hematuria.  A sample of urine was sent for culture.  She gets her stents changed now on a regular basis.    Past Medical, Past Surgical History:     Past Medical History:   Diagnosis Date    Bleeding from colostomy stoma (HCC)     Cancer (HCC)     Colon cancer (HCC)     Fall     Headache     Hemochromatosis, unspecified     History of transfusion     2022 - no adverse reaction    Hypertension     Kidney calculi     Kidney stone     Liver disease     hemangioma    Muscle weakness     Personal history of COVID-19 12/2022    Sarcoidosis     Seizures (HCC)     2022    Shingles    :    Past Surgical History:   Procedure Laterality Date    ABSCESS DRAINAGE      left breast    BREAST BIOPSY      CHEST TUBE INSERTION      COLON SURGERY      colostomy    COLONOSCOPY      FL GUIDED CENTRAL VENOUS ACCESS DEVICE INSERTION  03/21/2023    FL RETROGRADE PYELOGRAM  01/23/2023    FL RETROGRADE PYELOGRAM  04/03/2023    FL RETROGRADE PYELOGRAM  7/21/2023    FL RETROGRADE PYELOGRAM  10/11/2023    FL RETROGRADE PYELOGRAM  12/15/2023    FL RETROGRADE PYELOGRAM  2/8/2024    IR BIOPSY LIVER MASS  05/09/2023    LUNG BIOPSY      MT CYSTO BLADDER W/URETERAL CATHETERIZATION Bilateral 07/21/2023    Procedure: CYSTOSCOPY URETEROSCOPY RETROGRADE PYELOGRAM WITH BILATERAL STENT URETERAL EXCHANGE; LEFT LASER LITHOTRIPSY AND STONE BASKET RETRIEVAL;  Surgeon: José Luis Stephens MD;  Location: [facility];  Service: Urology    MT CYSTO BLADDER W/URETERAL CATHETERIZATION Bilateral  12/15/2023    Procedure: CYSTOSCOPY, BILATERAL  RETROGRADE PYELOGRAM , STENT EXCHANGE RIGHT , LEFT URETEROSCOPY ,  HOLMIUM LASER WITH INSERTION LEFT URETERAL STENT;  Surgeon: Derick Bhakta MD;  Location: [facility] MAIN [facility];  Service: Urology    MD CYSTO BLADDER W/URETERAL CATHETERIZATION Left 2/8/2024    Procedure: CYSTOSCOPY B/L RETROGRADE PYELOGRAM WITH B/L T URETERALSTENT EXCHANGE,LEFT URETEROSCOPY, DILATION LEFT URETERAL STICTURE;  Surgeon: José Luis Stephens MD;  Location[facility];  Service: Urology    MD CYSTO/URETERO W/LITHOTRIPSY &INDWELL STENT INSRT Bilateral 01/23/2023    Procedure: CYSTOSCOPY  RIGHT URETEROSCOPY WITH LITHOTRIPSY HOLMIUM LASER, BILATERAL RETROGRADE PYELOGRAM AND BILATERAL  URETERAL STENT INSERTION;  Surgeon: José Luis Stephens MD;  Location[facility];  Service: Urology    MD CYSTO/URETERO W/LITHOTRIPSY &INDWELL STENT INSRT Right 04/03/2023    Procedure: RIGHT URETEROSCOPY WITH LITHOTRIPSY HOLMIUM LASER, RETROGRADE PYELOGRAM; BILATERAL EXCHANGE STENT URETERAL;  Surgeon: José Luis Stephens MD;  [facility];  Service: Urology    MD CYSTO/URETERO W/LITHOTRIPSY &INDWELL STENT INSRT Bilateral 10/11/2023    Procedure: CYSTOSCOPY URETEROSCOPY RETROGRADE PYELOGRAM AND INSERTION STENT URETERAL DIAGNOSTINC RIGHT URETEROSCOPY, REMOVAL STENT LEFT SIDE;  Surgeon: José Luis Stephens MD;  [facility];  Service: Urology    MD INSJ TUNNELED CTR VAD W/SUBQ PORT AGE 5 YR/> N/A 03/21/2023    Procedure: INSERTION VENOUS PORT ( PORT-A-CATH) IR;  Surgeon: Mark Amos DO;  [facility];  Service: Interventional Radiology    MD LAPS COLECTOMY PRTL W/COLOPXTSTMY LW ANAST N/A 8/17/2023    Procedure: RESECTION COLON LOW ANTERIOR LAPAROSCOPIC WITH ROBOTICS;  Surgeon: Sree Umanzor MD;  Location: [facility];  Service: Colorectal    TONSILLECTOMY      URINARY SURGERY Bilateral     bilateral stents   :    Medications, Allergies:     Current Outpatient Medications:  "    acetaminophen (TYLENOL) 325 mg tablet, Take 2 tablets (650 mg total) by mouth every 4 (four) hours as needed for mild pain, Disp: , Rfl:     acetaminophen (TYLENOL) 500 mg tablet, Take 2 tablets (1,000 mg total) by mouth 3 (three) times a day, Disp: 180 tablet, Rfl: 11    amLODIPine (NORVASC) 10 mg tablet, Take 1 tablet (10 mg total) by mouth daily, Disp: 90 tablet, Rfl: 3    cyanocobalamin (VITAMIN B-12) 1000 MCG tablet, Take 1,000 mcg by mouth daily, Disp: , Rfl:     fenofibrate (TRICOR) 145 mg tablet, Take 1 tablet (145 mg total) by mouth daily, Disp: 90 tablet, Rfl: 3    oxybutynin (DITROPAN) 5 mg tablet, Take 1 tablet (5 mg total) by mouth every 6 (six) hours as needed (bladder spasms), Disp: 30 tablet, Rfl: 0    phenazopyridine (PYRIDIUM) 200 mg tablet, Take 1 tablet (200 mg total) by mouth 3 (three) times a day as needed for bladder spasms, Disp: 30 tablet, Rfl: 3    potassium chloride (MICRO-K) 10 MEQ CR capsule, Take 4 capsules (40 mEq total) by mouth 2 (two) times a day, Disp: 240 capsule, Rfl: 0    tamsulosin (FLOMAX) 0.4 mg, Take 1 capsule (0.4 mg total) by mouth daily with dinner, Disp: 15 capsule, Rfl: 0    ursodiol (ACTIGALL) 300 mg capsule, Take 3 capsules (900 mg total) by mouth in the morning, Disp: 90 capsule, Rfl: 11    docusate sodium (COLACE) 100 mg capsule, Take 1 capsule (100 mg total) by mouth 2 (two) times a day for 15 days, Disp: 30 capsule, Rfl: 0    Allergies:  Allergies   Allergen Reactions    Oxaliplatin Shortness Of Breath     Reactions occurred with 2nd and 3rd infusions (about 1-3 hours from initiation of infusion) and required treatment with steroids and antihistamines. Please refer to allergy note on 2/7/2023 for detailed description of her reactions.    Morphine Nausea Only     \"Every dose made me nauseous\" (oral dosing only, can tolerate IV morphine)    Potassium Chloride Other (See Comments)     Pt reports \"burning\" with Iv potassium administration and wishes for it to be " "added to chart   :    Social and Family History:   Social History:   Social History     Tobacco Use    Smoking status: Never     Passive exposure: Past    Smokeless tobacco: Never   Vaping Use    Vaping status: Never Used   Substance Use Topics    Alcohol use: Never    Drug use: Never   .    Social History     Tobacco Use   Smoking Status Never    Passive exposure: Past   Smokeless Tobacco Never       Family History:  Family History   Problem Relation Age of Onset    Cancer Mother     Cirrhosis Father     Breast cancer Neg Hx     Breast cancer additional onset Neg Hx    :     Review of Systems:     General: Fever, chills, or night sweats: negative  Cardiac: Negative for chest pain.    Pulmonary: Negative for shortness of breath.  Gastrointestinal: Abdominal pain negative.  Nausea, vomiting, or diarrhea negative,  Genitourinary: See HPI above.  Patient does have hematuria.  All other systems queried were negative.    Physical Exam:   General: Patient is pleasant and in NAD. Awake and alert  /64 (BP Location: Left arm, Patient Position: Sitting, Cuff Size: Standard)   Pulse 85   Ht 5' 2\" (1.575 m)   Wt 64.9 kg (143 lb)   SpO2 99%   BMI 26.16 kg/m²   Cardiac: regular rate  Peripheral edema: negative  Pulmonary: Non-labored breathing lungs clear bilaterally  Abdomen: Soft, non-tender, non-distended.  No surgical scars.  No masses, tenderness, hernias noted.    Genitourinary: Negative CVA tenderness, negative suprapubic tenderness.      Labs:     Lab Results   Component Value Date    HGB 11.8 03/07/2024    HCT 36.6 03/07/2024    WBC 7.03 03/07/2024     03/07/2024   ]    Lab Results   Component Value Date    K 4.4 03/07/2024     (H) 03/07/2024    CO2 22 03/07/2024    BUN 41 (H) 03/07/2024    CREATININE 1.70 (H) 03/07/2024    CALCIUM 10.0 03/07/2024   ]    Imaging:   I personally reviewed the images and report of the following studies, and reviewed them with the patient:  IMPRESSION:     Persistent " mild left-sided hydroureteronephrosis with stably positioned double-J ureteral stent in place. Left-sided nephrolithiasis.     Improved right-sided hydronephrosis with stably positioned double-J ureteral stent in place. Right-sided nephrolithiasis.     Essentially stable pulmonary nodules, majority of which are cavitary. Again, the differential diagnosis includes but is not limited to metastatic disease and cavitary pulmonary sarcoidosis. No mediastinal lymphadenopathy.     Spleen top normal in size with heterogeneous appearance and some nodularity, similar to prior, likely related to the patient's known history of sarcoidosis.    ASSESSMENT:   #1.  Bilateral nephroureteral stents  #2.  Complicated nephrolithiasis  #3.  Complicated ureteral stricture disease  4.  Rectal cancer  PLAN:   -Urine sent for culture  -Consent signed for stent changed  -    Sherman Garcia PA-C

## 2024-03-26 LAB — BACTERIA UR CULT: NORMAL

## 2024-03-28 ENCOUNTER — OFFICE VISIT (OUTPATIENT)
Dept: NEPHROLOGY | Facility: CLINIC | Age: 59
End: 2024-03-28
Payer: COMMERCIAL

## 2024-03-28 VITALS
DIASTOLIC BLOOD PRESSURE: 82 MMHG | HEART RATE: 102 BPM | BODY MASS INDEX: 26.5 KG/M2 | WEIGHT: 144 LBS | SYSTOLIC BLOOD PRESSURE: 130 MMHG | HEIGHT: 62 IN

## 2024-03-28 DIAGNOSIS — E27.8 ADRENAL NODULE (HCC): ICD-10-CM

## 2024-03-28 DIAGNOSIS — D50.9 IRON DEFICIENCY ANEMIA, UNSPECIFIED IRON DEFICIENCY ANEMIA TYPE: ICD-10-CM

## 2024-03-28 DIAGNOSIS — N20.1 URETERAL CALCULI: ICD-10-CM

## 2024-03-28 DIAGNOSIS — I10 PRIMARY HYPERTENSION: ICD-10-CM

## 2024-03-28 DIAGNOSIS — R30.0 DYSURIA: ICD-10-CM

## 2024-03-28 DIAGNOSIS — N17.9 AKI (ACUTE KIDNEY INJURY) (HCC): Primary | ICD-10-CM

## 2024-03-28 DIAGNOSIS — D50.8 IRON DEFICIENCY ANEMIA SECONDARY TO INADEQUATE DIETARY IRON INTAKE: ICD-10-CM

## 2024-03-28 DIAGNOSIS — E83.42 HYPOMAGNESEMIA: ICD-10-CM

## 2024-03-28 DIAGNOSIS — C18.7 MALIGNANT NEOPLASM OF SIGMOID COLON (HCC): ICD-10-CM

## 2024-03-28 PROCEDURE — 99214 OFFICE O/P EST MOD 30 MIN: CPT | Performed by: STUDENT IN AN ORGANIZED HEALTH CARE EDUCATION/TRAINING PROGRAM

## 2024-03-28 RX ORDER — FERROUS SULFATE 324(65)MG
324 TABLET, DELAYED RELEASE (ENTERIC COATED) ORAL
Qty: 90 TABLET | Refills: 1 | Status: SHIPPED | OUTPATIENT
Start: 2024-03-28

## 2024-03-28 NOTE — PROGRESS NOTES
NEPHROLOGY OUTPATIENT PROGRESS NOTE   Danielle Haque 58 y.o. female MRN: [id number]  DATE: 3/31/2024    Reason for visit:   Chief Complaint   Patient presents with    Follow-up        Patient Instructions   Thank you for coming to your visit today. As we discussed you kidney function was above at the beginning of March h, your creatinine was 1.7mg/dL.  Most likely secondary to right kidney obstruction from kidney stone.  Please follow the recommendations below       Recommend low sodium (salt) food    Avoid nonsteroidal anti-inflammatory drugs such as Naprosyn, ibuprofen, Aleve, Advil, Celebrex, Meloxicam (Mobic) etc.  You can use Tylenol as needed if you do not have any liver condition to be concerned about    Try to exercise at least 30 minutes 3 days a week to begin with with an ultimate goal of 5 days a week for at least 30 minutes    Measures to reduce stone development:    Please Drink  oz of water or 2.5L-3 L a day, throughout the day  Avoid salt/low-salt diet   Try to decrease animal protein intake, dairy protein and vegetable base protein are better  Increase fruits and vegetables as much as possible  Avoid calcium products such as Tums or other types of calcium containing antacids, you can use Pepcid for indigestion (but you do not have to restrict your dietary calcium)  Avoid excessive vitamin C  Try to avoid oxalate products (please refer to the diet sheet)  Limit fructose and sucrose type drinks such as coke      Please repeat your blood work in 2 weeks      Next Visit in 3-4 months with results   If you need to see us earlier we can change the appointment for you      Joselyn Reyes Bahamonde, MD  Nephrology Attending         Danielle was seen today for follow-up.    Diagnoses and all orders for this visit:    THOR (acute kidney injury) (HCC)  -     Uric acid; Future  -     Basic metabolic panel; Future  -     Basic metabolic panel; Future    Hypomagnesemia  -     Magnesium; Future    Iron deficiency  anemia, unspecified iron deficiency anemia type  -     ferrous sulfate 324 (65 Fe) mg; Take 1 tablet (324 mg total) by mouth daily before breakfast    Primary hypertension    Malignant neoplasm of sigmoid colon (HCC)    Adrenal nodule (HCC)    Dysuria    Anemia, unspecified type    Ureteral calculi with hydroureteronephrosis        Assessment/Plan:  58 y.o woman with PMH cT3 cN0 cM0 rectal adenocarcinoma in the [facility] s/p diverting ileostomy, started on neoadjuvant CapeOx, complicated with reaction on the second cycle, treated with Xeloda,5-FU with RT , renal stricture with bilateral stent , sarcoidosis, on steroids.  Patient was admitted in August 2023 with SBO complicated with hypokalemia.  Patient is here for follow-up of hypokalemia      PLAN:     # Hypokalemia/ypomagnesemia  Potassium 4.2 mg/L  Continue with potassium supplements  Patient has adrenal nodule  ARR 20  Normotensive, will monitor        #Volume status/hypertension:  Volume-euvolemic on exam  Blood pressure: Normotensive, /82, goal less than 140/90  Recommend   Low sodium diet  BP well-controlled with amlodipine  No clear evidence of hyperaldosteronism at this time  Advised to maintain a good BP control to prevent progression of CKD         # Rectal adenocarcinoma  Status post diverting ileostomy  5-FU with RT  Follow-up with oncology        # Adrenal nodule  8 mm right adrenal nodule  ARR 20  potassium stable with potassium supplements   monitor     # Dysuria  Hematuria with no bacteria  No antibiotics at this time     #Non-Oliguric KDIGO THOR stage 1  Etiology: Secondary to obstructive uropathy with right hydronephrosis in the settings of nephrolithiasis  Current creatinine: 0.9 mg/dL, back to baseline  Peak creatinine: 1.75 mg/dL  UA: Leukocyturia, hematuria  Renal imaging : Right-sided hydronephrosis, resolved  Treatment:   Increase fluid intake  Scheduled for cystoscopy      #Acid-base Disorder  serum HCO3 24 mmol/L  At  "goal    #Anemia:  Current hemoglobin:10.6  Will check iron panel    # nephrolithiasis  Kidney stone has right-sided obstruction  Follow-up with urology  Enforce fluid intake  Will check uric acid      SUBJECTIVE / INTERVAL HISTORY:  58 y.o. female presents in follow up of upper kalemia.  Patient had episode of kidney stones with right-sided hydronephrosis.  As well, no shortness of breath, no chest pain    Danielle Haque denies any recent illness/hospitalizations/medication changes since last office visit.        Review of Systems   Constitutional:  Negative for activity change and appetite change.   HENT:  Negative for congestion and dental problem.    Eyes:  Negative for discharge.   Respiratory:  Negative for shortness of breath and stridor.    Cardiovascular:  Negative for chest pain and leg swelling.   Gastrointestinal:  Negative for abdominal distention and abdominal pain.   Endocrine: Negative for cold intolerance.   Genitourinary:  Negative for dysuria.   Musculoskeletal:  Negative for arthralgias.   Skin:  Negative for color change and pallor.   Neurological:  Negative for dizziness.   Psychiatric/Behavioral:  Negative for agitation.        OBJECTIVE:  /82 (BP Location: Left arm, Patient Position: Sitting, Cuff Size: Large)   Pulse 102   Ht 5' 2\" (1.575 m)   Wt 65.3 kg (144 lb)   BMI 26.34 kg/m²  Body mass index is 26.34 kg/m².    Physical exam:  Physical Exam  General:  no acute distress at this time  Skin:  No acute rash  Eyes:  No scleral icterus and noninjected  ENT:  mucous membranes moist  Neck:  no carotid bruits  Chest:  Clear to auscultation percussion, good respiratory effort, no use of accessory respiratory muscles  CVS:  Regular rate and rhythm without rub   Abdomen:  soft and nontender   Extremities: no significant lower extremity edema  Neuro:  No gross focality  Psych:  Alert , cooperative       Medications:    Current Outpatient Medications:     acetaminophen (TYLENOL) 325 mg " tablet, Take 2 tablets (650 mg total) by mouth every 4 (four) hours as needed for mild pain, Disp: , Rfl:     acetaminophen (TYLENOL) 500 mg tablet, Take 2 tablets (1,000 mg total) by mouth 3 (three) times a day, Disp: 180 tablet, Rfl: 11    amLODIPine (NORVASC) 10 mg tablet, Take 1 tablet (10 mg total) by mouth daily, Disp: 90 tablet, Rfl: 3    cyanocobalamin (VITAMIN B-12) 1000 MCG tablet, Take 1,000 mcg by mouth daily, Disp: , Rfl:     docusate sodium (COLACE) 100 mg capsule, Take 1 capsule (100 mg total) by mouth 2 (two) times a day for 15 days, Disp: 30 capsule, Rfl: 0    fenofibrate (TRICOR) 145 mg tablet, Take 1 tablet (145 mg total) by mouth daily, Disp: 90 tablet, Rfl: 3    ferrous sulfate 324 (65 Fe) mg, Take 1 tablet (324 mg total) by mouth daily before breakfast, Disp: 90 tablet, Rfl: 1    oxybutynin (DITROPAN) 5 mg tablet, Take 1 tablet (5 mg total) by mouth every 6 (six) hours as needed (bladder spasms), Disp: 30 tablet, Rfl: 0    phenazopyridine (PYRIDIUM) 200 mg tablet, Take 1 tablet (200 mg total) by mouth 3 (three) times a day as needed for bladder spasms, Disp: 30 tablet, Rfl: 3    potassium chloride (MICRO-K) 10 MEQ CR capsule, Take 4 capsules (40 mEq total) by mouth 2 (two) times a day, Disp: 240 capsule, Rfl: 0    tamsulosin (FLOMAX) 0.4 mg, Take 1 capsule (0.4 mg total) by mouth daily with dinner, Disp: 15 capsule, Rfl: 0    ursodiol (ACTIGALL) 300 mg capsule, Take 3 capsules (900 mg total) by mouth in the morning, Disp: 90 capsule, Rfl: 11  No current facility-administered medications for this visit.    Facility-Administered Medications Ordered in Other Visits:     alteplase (CATHFLO) injection 2 mg, 2 mg, Intracatheter, Q1MIN PRN, Al Ferrera MD    Allergies:  Allergies as of 03/28/2024 - Reviewed 03/28/2024   Allergen Reaction Noted    Oxaliplatin Shortness Of Breath 02/07/2023    Morphine Nausea Only 09/11/2023    Potassium chloride Other (See Comments) 02/07/2024       The following  "portions of the patient's history were reviewed and updated as appropriate: past family history, past surgical history and problem list.    Laboratory Results:  Lab Results   Component Value Date    SODIUM 139 03/29/2024    K 4.2 03/29/2024     03/29/2024    CO2 24 03/29/2024    BUN 29 (H) 03/29/2024    CREATININE 0.99 03/29/2024    GLUC 139 03/29/2024    CALCIUM 9.9 03/29/2024        Lab Results   Component Value Date    CALCIUM 9.9 03/29/2024    PHOS 3.7 08/27/2023       Portions of the record may have been created with voice recognition software.  Occasional wrong word or \"sound a like\" substitutions may have occurred due to the inherent limitations of voice recognition software.  Read the chart carefully and recognize, using context, where substitutions have occurred.    "

## 2024-03-28 NOTE — PATIENT INSTRUCTIONS
Thank you for coming to your visit today. As we discussed you kidney function was above at the beginning of March h, your creatinine was 1.7mg/dL.  Most likely secondary to right kidney obstruction from kidney stone.  Please follow the recommendations below       Recommend low sodium (salt) food    Avoid nonsteroidal anti-inflammatory drugs such as Naprosyn, ibuprofen, Aleve, Advil, Celebrex, Meloxicam (Mobic) etc.  You can use Tylenol as needed if you do not have any liver condition to be concerned about    Try to exercise at least 30 minutes 3 days a week to begin with with an ultimate goal of 5 days a week for at least 30 minutes    Measures to reduce stone development:    Please Drink  oz of water or 2.5L-3 L a day, throughout the day  Avoid salt/low-salt diet   Try to decrease animal protein intake, dairy protein and vegetable base protein are better  Increase fruits and vegetables as much as possible  Avoid calcium products such as Tums or other types of calcium containing antacids, you can use Pepcid for indigestion (but you do not have to restrict your dietary calcium)  Avoid excessive vitamin C  Try to avoid oxalate products (please refer to the diet sheet)  Limit fructose and sucrose type drinks such as coke      Please repeat your blood work in 2 weeks      Next Visit in 3-4 months with results   If you need to see us earlier we can change the appointment for you      Joselyn Reyes Bahamonde, MD  Nephrology Attending

## 2024-03-29 ENCOUNTER — HOSPITAL ENCOUNTER (OUTPATIENT)
Dept: INFUSION CENTER | Facility: CLINIC | Age: 59
End: 2024-03-29
Payer: COMMERCIAL

## 2024-03-29 DIAGNOSIS — Z95.828 PORT-A-CATH IN PLACE: Primary | ICD-10-CM

## 2024-03-29 DIAGNOSIS — D86.89 HEPATIC GRANULOMA ASSOCIATED WITH SARCOIDOSIS: ICD-10-CM

## 2024-03-29 LAB
ALBUMIN SERPL BCP-MCNC: 4.1 G/DL (ref 3.5–5)
ALP SERPL-CCNC: 127 U/L (ref 34–104)
ALT SERPL W P-5'-P-CCNC: 45 U/L (ref 7–52)
ANION GAP SERPL CALCULATED.3IONS-SCNC: 9 MMOL/L (ref 4–13)
AST SERPL W P-5'-P-CCNC: 28 U/L (ref 13–39)
BASOPHILS # BLD MANUAL: 0.08 THOUSAND/UL (ref 0–0.1)
BASOPHILS NFR MAR MANUAL: 1 % (ref 0–1)
BILIRUB SERPL-MCNC: 0.56 MG/DL (ref 0.2–1)
BUN SERPL-MCNC: 29 MG/DL (ref 5–25)
CALCIUM SERPL-MCNC: 9.9 MG/DL (ref 8.4–10.2)
CHLORIDE SERPL-SCNC: 106 MMOL/L (ref 96–108)
CO2 SERPL-SCNC: 24 MMOL/L (ref 21–32)
CREAT SERPL-MCNC: 0.99 MG/DL (ref 0.6–1.3)
CRP SERPL QL: 7.9 MG/L
EOSINOPHIL # BLD MANUAL: 0.08 THOUSAND/UL (ref 0–0.4)
EOSINOPHIL NFR BLD MANUAL: 1 % (ref 0–6)
ERYTHROCYTE [DISTWIDTH] IN BLOOD BY AUTOMATED COUNT: 14.7 % (ref 11.6–15.1)
ERYTHROCYTE [SEDIMENTATION RATE] IN BLOOD: 24 MM/HOUR (ref 0–29)
GFR SERPL CREATININE-BSD FRML MDRD: 63 ML/MIN/1.73SQ M
GLUCOSE SERPL-MCNC: 139 MG/DL (ref 65–140)
HCT VFR BLD AUTO: 33.7 % (ref 34.8–46.1)
HGB BLD-MCNC: 10.6 G/DL (ref 11.5–15.4)
INR PPP: 0.93 (ref 0.84–1.19)
LYMPHOCYTES # BLD AUTO: 0.24 THOUSAND/UL (ref 0.6–4.47)
LYMPHOCYTES # BLD AUTO: 3 % (ref 14–44)
MCH RBC QN AUTO: 26.8 PG (ref 26.8–34.3)
MCHC RBC AUTO-ENTMCNC: 31.5 G/DL (ref 31.4–37.4)
MCV RBC AUTO: 85 FL (ref 82–98)
MONOCYTES # BLD AUTO: 0.24 THOUSAND/UL (ref 0–1.22)
MONOCYTES NFR BLD: 3 % (ref 4–12)
NEUTROPHILS # BLD MANUAL: 7.46 THOUSAND/UL (ref 1.85–7.62)
NEUTS SEG NFR BLD AUTO: 92 % (ref 43–75)
PLATELET # BLD AUTO: 205 THOUSANDS/UL (ref 149–390)
PLATELET BLD QL SMEAR: ADEQUATE
PMV BLD AUTO: 9.4 FL (ref 8.9–12.7)
POTASSIUM SERPL-SCNC: 4.2 MMOL/L (ref 3.5–5.3)
PROT SERPL-MCNC: 6.7 G/DL (ref 6.4–8.4)
PROTHROMBIN TIME: 13 SECONDS (ref 11.6–14.5)
RBC # BLD AUTO: 3.96 MILLION/UL (ref 3.81–5.12)
RBC MORPH BLD: NORMAL
SODIUM SERPL-SCNC: 139 MMOL/L (ref 135–147)
WBC # BLD AUTO: 8.11 THOUSAND/UL (ref 4.31–10.16)

## 2024-03-29 PROCEDURE — 80053 COMPREHEN METABOLIC PANEL: CPT

## 2024-03-29 PROCEDURE — 82164 ANGIOTENSIN I ENZYME TEST: CPT

## 2024-03-29 PROCEDURE — 86140 C-REACTIVE PROTEIN: CPT

## 2024-03-29 PROCEDURE — 85610 PROTHROMBIN TIME: CPT

## 2024-03-29 PROCEDURE — 85007 BL SMEAR W/DIFF WBC COUNT: CPT

## 2024-03-29 PROCEDURE — 85652 RBC SED RATE AUTOMATED: CPT

## 2024-03-29 PROCEDURE — 85027 COMPLETE CBC AUTOMATED: CPT

## 2024-03-29 NOTE — PROGRESS NOTES
Pt. offers no complaints.  Port accessed, labs drawn.  AVS declined, next appt. confirmed for 5/9/24 @ 1:00.

## 2024-03-31 PROBLEM — IMO0002 CREATININE ELEVATION: Status: RESOLVED | Noted: 2023-03-15 | Resolved: 2024-03-31

## 2024-03-31 PROBLEM — E83.42 HYPOMAGNESEMIA: Status: ACTIVE | Noted: 2024-03-31

## 2024-03-31 PROBLEM — R93.89 ABNORMAL CT OF THE CHEST: Status: RESOLVED | Noted: 2023-03-07 | Resolved: 2024-03-31

## 2024-04-01 LAB — ACE SERPL-CCNC: 85 U/L (ref 14–82)

## 2024-04-05 ENCOUNTER — PATIENT MESSAGE (OUTPATIENT)
Dept: DERMATOLOGY | Facility: CLINIC | Age: 59
End: 2024-04-05

## 2024-04-08 DIAGNOSIS — N20.1 URETERAL CALCULI: ICD-10-CM

## 2024-04-08 RX ORDER — OXYBUTYNIN CHLORIDE 5 MG/1
5 TABLET ORAL EVERY 6 HOURS PRN
Qty: 30 TABLET | Refills: 5 | Status: SHIPPED | OUTPATIENT
Start: 2024-04-08

## 2024-04-09 ENCOUNTER — TELEPHONE (OUTPATIENT)
Age: 59
End: 2024-04-09

## 2024-04-09 NOTE — PATIENT COMMUNICATION
Rec'd call from Rudy.    CTS unavailable at this time. Unsure of what was going to be offered to patient? (AP, Amb)    Please advise how to schedule patient.    Rudy [phone]

## 2024-04-09 NOTE — TELEPHONE ENCOUNTER
Me     CB    4/9/24  5:01 PM  Unsigned Note     Rec'd call from Rudy.     CTS unavailable at this time. Unsure of what was going to be offered to patient? (AP, Amb)     Please advise how to schedule patient.     Rudy [phone]          Patient Message  Open  4/5/2024  [facility] Jesus Wheeler MD  Dermatology     All Conversations: Lump behind left ear  (Oldest Message First)  April 5, 2024  Danielle Haque   to [facility] (supporting Abhishek Wheeler MD)         4/5/24  7:49 PM  Hello Dr Wheeler. This is Rudy Haque writing on behalf of Danielle. As Danielle has mentioned to a couple of doctors before, she has a hard lump behind her left ear. She says that it is getting a bit larger and more painful so she thinks maybe she should come in. Not much is actively going on with her care now. Do you think she should make an appointment to see you? Let me know.  Thanks.     Rudy Haque  April 8, 2024  Lana Haque   TM      4/8/24  7:50 AM  Good morning Rudy Santos ,   Please reach out to our office at your earliest convenience to schedule an appointment.   Our  phone number is [phone] option 1.     Thank you,   [facility]    Last read by Danielle Haque at  2:18 PM on 4/9/2024.  April 9, 2024

## 2024-04-16 ENCOUNTER — OFFICE VISIT (OUTPATIENT)
Dept: DERMATOLOGY | Facility: CLINIC | Age: 59
End: 2024-04-16

## 2024-04-16 VITALS — HEIGHT: 62 IN | TEMPERATURE: 96.9 F | BODY MASS INDEX: 25.95 KG/M2 | WEIGHT: 141 LBS

## 2024-04-16 DIAGNOSIS — D23.9 DERMATOFIBROMA: Primary | ICD-10-CM

## 2024-04-16 DIAGNOSIS — R22.9 LUMP OF SKIN: ICD-10-CM

## 2024-04-16 DIAGNOSIS — L72.0 EPIDERMAL CYST: ICD-10-CM

## 2024-04-16 RX ORDER — PREDNISONE 5 MG/1
5 TABLET ORAL DAILY
COMMUNITY

## 2024-04-16 NOTE — PATIENT INSTRUCTIONS
"LUMP BEHIND EAR      Assessment and Plan:  Based on a thorough discussion of this condition and the management approach to it (including a comprehensive discussion of the known risks, side effects and potential benefits of treatment), the patient (family) agrees to implement the following specific plan:  Ultrasound of spot behind ear needs to be done to rule out swollen lymph node    DERMATOFIBROMA        Assessment and Plan:  Based on a thorough discussion of this condition and the management approach to it (including a comprehensive discussion of the known risks, side effects and potential benefits of treatment), the patient (family) agrees to implement the following specific plan:  Reassured benign    Assessment and Plan:  A dermatofibroma is a common benign fibrous nodule that most often arises on the skin of the lower legs.  A dermatofibroma is also called a \"cutaneous fibrous histiocytoma.\"  Dermatofibromas occur at all ages and in people of every ethnicity. They are more common in women than in men.    It is not clear if dermatofibroma is a reactive process or if it is a neoplasm. The lesions are made up of proliferating fibroblasts. Histiocytes may also be involved.  They are sometimes attributed to an insect bite or ingrownhair or local trauma, but not consistently. They may be more numerous in patients with altered immunity.    Dermatofibromas most often occur on the legs and arms, but may also arise on the trunk or any site of the body.  Typical clinical features include the following:  People may have 1 or up to 15 lesions.  Size varies from 0.5-1.5 cm diameter; most lesions are 7-10 mm diameter.  They are firm nodules tethered to the skin surface and mobile over subcutaneous tissue.  The skin \"dimples\" on pinching the lesion.  Color may be pink to light brown in white skin, and dark brown to black in dark skin; some appear paler in the center.  They do not usually cause symptoms, but they are sometimes " painful or itchy.  Because they are often raised lesions, they may be traumatized, for example by a razor.  Occasionally dozens may erupt within a few months, usually in the setting of immunosuppression (for example autoimmune disease, cancer or certain medications).  Dermatofibroma does not give rise to cancer. However, occasionally, it may be mistaken for dermatofibrosarcoma or desmoplastic melanoma.    A dermatofibroma is harmless and seldom causes any symptoms. Usually, only reassurance is needed. If it is nuisance or causing concern, the lesion can be removed surgically, resulting in a scar that is, by definition, usually longer in diameter than the widest portion of the dermatofibroma.  Cryotherapy, shave biopsy and laser surgery are rarely completely successful.  Skin punch biopsy or incisional biopsy may be undertaken if there is an atypical feature such as recent enlargement, ulceration, or asymmetrical structures and colours on dermatoscopy.

## 2024-04-16 NOTE — PROGRESS NOTES
"[facility] Clinic Note     Patient Name: Danielle Haque  Encounter Date: 4/16/24     Have you been cared for by a [facility] Dermatologist in the last 3 years and, if so, which description applies to you?    Yes.  I have been here within the last 3 years, and my medical history has NOT changed since that time.  I am FEMALE/of child-bearing potential.    REVIEW OF SYSTEMS:  Have you recently had or currently have any of the following? No changes in my recent health.   PAST MEDICAL HISTORY:  Have you personally ever had or currently have any of the following?  If \"YES,\" then please provide more detail. No changes in my medical history.   HISTORY OF IMMUNOSUPPRESSION: Do you have a history of any of the following:  Systemic Immunosuppression such as Diabetes, Biologic or Immunotherapy, Chemotherapy, Organ Transplantation, Bone Marrow Transplantation?  No     Answering \"YES\" requires the addition of the dotphrase \"IMMUNOSUPPRESSED\" as the first diagnosis of the patient's visit.   FAMILY HISTORY:  Any \"first degree relatives\" (parent, brother, sister, or child) with the following?    No changes in my family's known health.   PATIENT EXPERIENCE:    Do you want the Dermatologist to perform a COMPLETE skin exam today including a clinical examination under the \"bra and underwear\" areas?  NO  If necessary, do we have your permission to call and leave a detailed message on your Preferred Phone number that includes your specific medical information?  Yes      Allergies   Allergen Reactions    Oxaliplatin Shortness Of Breath     Reactions occurred with 2nd and 3rd infusions (about 1-3 hours from initiation of infusion) and required treatment with steroids and antihistamines. Please refer to allergy note on 2/7/2023 for detailed description of her reactions.    Morphine Nausea Only     \"Every dose made me nauseous\" (oral dosing only, can tolerate IV morphine)    Potassium Chloride Other (See Comments)     Pt reports " "\"burning\" with Iv potassium administration and wishes for it to be added to chart      Current Outpatient Medications:     amLODIPine (NORVASC) 10 mg tablet, Take 1 tablet (10 mg total) by mouth daily, Disp: 90 tablet, Rfl: 3    cholecalciferol 400 units tablet, Take 400 Units by mouth daily, Disp: , Rfl:     cyanocobalamin (VITAMIN B-12) 1000 MCG tablet, Take 1,000 mcg by mouth daily, Disp: , Rfl:     docusate sodium (COLACE) 100 mg capsule, Take 1 capsule (100 mg total) by mouth 2 (two) times a day for 15 days, Disp: 30 capsule, Rfl: 0    fenofibrate (TRICOR) 145 mg tablet, Take 1 tablet (145 mg total) by mouth daily, Disp: 90 tablet, Rfl: 3    ferrous sulfate 324 (65 Fe) mg, Take 1 tablet (324 mg total) by mouth daily before breakfast, Disp: 90 tablet, Rfl: 1    oxybutynin (DITROPAN) 5 mg tablet, TAKE 1 TABLET (5 MG TOTAL) BY MOUTH EVERY 6 (SIX) HOURS AS NEEDED (BLADDER SPASMS), Disp: 30 tablet, Rfl: 5    potassium chloride (MICRO-K) 10 MEQ CR capsule, Take 4 capsules (40 mEq total) by mouth 2 (two) times a day, Disp: 240 capsule, Rfl: 0    predniSONE 5 mg tablet, Take 5 mg by mouth daily, Disp: , Rfl:     tamsulosin (FLOMAX) 0.4 mg, Take 1 capsule (0.4 mg total) by mouth daily with dinner, Disp: 15 capsule, Rfl: 0    ursodiol (ACTIGALL) 300 mg capsule, Take 3 capsules (900 mg total) by mouth in the morning, Disp: 90 capsule, Rfl: 11    acetaminophen (TYLENOL) 325 mg tablet, Take 2 tablets (650 mg total) by mouth every 4 (four) hours as needed for mild pain (Patient not taking: Reported on 4/16/2024), Disp: , Rfl:     acetaminophen (TYLENOL) 500 mg tablet, Take 2 tablets (1,000 mg total) by mouth 3 (three) times a day (Patient not taking: Reported on 4/16/2024), Disp: 180 tablet, Rfl: 11    phenazopyridine (PYRIDIUM) 200 mg tablet, Take 1 tablet (200 mg total) by mouth 3 (three) times a day as needed for bladder spasms (Patient not taking: Reported on 4/16/2024), Disp: 30 tablet, Rfl: 3            Whom besides " "the patient is providing clinical information about today's encounter?   Other:  Ex  present    Physical Exam and Assessment/Plan by Diagnosis:      LEFT POSTAURICULAR NODULE     Physical Exam:  Anatomic Location Affected:  left post auricular region   Morphological Description:  tender, firm (not fixed) nodule     Additional History of Present Condition:  Patient has painful bump behind left ear, hurts and sometime itches x a few weeks.     Assessment and Plan:  Based on a thorough discussion of this condition and the management approach to it (including a comprehensive discussion of the known risks, side effects and potential benefits of treatment), the patient (family) agrees to implement the following specific plan:  Ultrasound ordered to r/o swollen lymph node vs epidermal cyst     DERMATOFIBROMA    Physical Exam:  Anatomic Location Affected:  Left calf  Morphological Description:  firm, hyperpigmented nodule; centralized umbilication on pinch       Additional History of Present Condition:  Present upon skin exam.     Assessment and Plan:  Based on a thorough discussion of this condition and the management approach to it (including a comprehensive discussion of the known risks, side effects and potential benefits of treatment), the patient (family) agrees to implement the following specific plan:  Reassured benign    Assessment and Plan:  A dermatofibroma is a common benign fibrous nodule that most often arises on the skin of the lower legs.  A dermatofibroma is also called a \"cutaneous fibrous histiocytoma.\"  Dermatofibromas occur at all ages and in people of every ethnicity. They are more common in women than in men.    It is not clear if dermatofibroma is a reactive process or if it is a neoplasm. The lesions are made up of proliferating fibroblasts. Histiocytes may also be involved.  They are sometimes attributed to an insect bite or ingrownhair or local trauma, but not consistently. They may be more " "numerous in patients with altered immunity.    Dermatofibromas most often occur on the legs and arms, but may also arise on the trunk or any site of the body.  Typical clinical features include the following:  People may have 1 or up to 15 lesions.  Size varies from 0.5-1.5 cm diameter; most lesions are 7-10 mm diameter.  They are firm nodules tethered to the skin surface and mobile over subcutaneous tissue.  The skin \"dimples\" on pinching the lesion.  Color may be pink to light brown in white skin, and dark brown to black in dark skin; some appear paler in the center.  They do not usually cause symptoms, but they are sometimes painful or itchy.  Because they are often raised lesions, they may be traumatized, for example by a razor.  Occasionally dozens may erupt within a few months, usually in the setting of immunosuppression (for example autoimmune disease, cancer or certain medications).  Dermatofibroma does not give rise to cancer. However, occasionally, it may be mistaken for dermatofibrosarcoma or desmoplastic melanoma.    A dermatofibroma is harmless and seldom causes any symptoms. Usually, only reassurance is needed. If it is nuisance or causing concern, the lesion can be removed surgically, resulting in a scar that is, by definition, usually longer in diameter than the widest portion of the dermatofibroma.  Cryotherapy, shave biopsy and laser surgery are rarely completely successful.  Skin punch biopsy or incisional biopsy may be undertaken if there is an atypical feature such as recent enlargement, ulceration, or asymmetrical structures and colours on dermatoscopy.     EPIDERMAL INCLUSION CYST    Physical Exam:  Anatomic Location Affected:  Right anterior neck & left upper back   Morphological Description:  subcutaneous nodule      Additional History of Present Condition:  Present on exam.     Assessment and Plan:  Based on a thorough discussion of this condition and the management approach to it " (including a comprehensive discussion of the known risks, side effects and potential benefits of treatment), the patient (family) agrees to implement the following specific plan:  Reassured benign    What are epidermal inclusion cysts?  Epidermal inclusion cysts are the most common, benign cutaneous cysts. There are many different names for epidermal inclusion cysts, including epidermoid cyst, epidermal cyst, infundibular cyst, inclusion cyst, and keratin cyst. These cysts can occur anywhere on the body and typically present as nodules directly underneath the skin. There is often a visible pore or opening in the center. The cysts are freely moveable and can range from a few millimeters to several centimeters in diameter. The center of epidermoid cysts almost always contains keratin, which has a cheesy appearance, and not sebum. They also do not originate from sebaceous glands. Therefore, epidermal inclusion cysts are not the same as sebaceous cysts.    Cysts may remain stable or progressively enlarge over time. There are no reliable predictive factors to tell if an epidermal inclusion cyst will enlarge, become inflamed, or remain quiescent. Infected cysts tend to become larger, turn red, and are more noticeable to the patient. There may be accompanying pain and discomfort.     What causes epidermal inclusion cysts?  Epidermal inclusion cysts often appear out of the blue and are not contagious. They are due to a proliferation of epidermal cells within the dermis and are more common in men than women. They occur more frequently in patients in their 20s to 40s. Epidermal inclusion cysts by themselves are usually not inherited, but they can be hereditary in rare syndromes such as Clark syndrome, nodular elastosis with cysts and comedones (Favre-Racouchot syndrome), and basal cell nevus syndrome (Gorlin syndrome). Elderly patients with chronic sun-damaged skin areas have a higher likelihood of developing epidermoid  cysts. They often occur in areas where hair follicles have been inflamed or repeatedly irritated are more frequent in patients with acne vulgaris. In the  period, they are called milia.     Patients on BRAF inhibitors such as imiquimod and cyclosporine have a higher incidence of epidermoid cysts of the face.    How do we diagnose an epidermal inclusion cyst?  Epidermoid inclusion cysts are often diagnosed by history and physical exam. There is usually no need for biopsy prior to removal.  Radiographic and laboratory exams, such as ultrasound studies, are unnecessary and not typically ordered unless the practitioner suspects a genetic condition.    What is the treatment for an epidermal inclusion cyst?  Inflamed, uninfected epidermal inclusion cysts rarely resolve spontaneously without therapy or surgical intervention. Treatment is not emergent unless desired by the patient.     Definitive treatment is via surgical excision with walls intact. This method will prevent recurrence. This is best done when the cyst is not inflamed, to decrease the probability of rupture during surgery.   A local anesthetic will be injected around the cyst  A small incision is made in the skin overlying the cyst, and contents are expressed  The incision is repaired with sutures    Another option is to use a 4mm punch biopsy with cyst extraction through the defect.    Incision and drainage is often needed if the cyst is infected or inflamed. If there is surrounding cellulitis, oral antibiotic therapy may be necessary. The common agents used target methicillin sensitive Staphylococcal aureus and methicillin resistant S aureus in areas of high prevalence.      Scribe Attestation      I,:  Cyndy Beltran am acting as a scribe while in the presence of the attending physician.:       I,:  ANTHONY Jenkins personally performed the services described in this documentation    as scribed in my presence.:

## 2024-04-24 ENCOUNTER — HOSPITAL ENCOUNTER (OUTPATIENT)
Dept: ULTRASOUND IMAGING | Facility: HOSPITAL | Age: 59
Discharge: HOME | End: 2024-04-24
Payer: COMMERCIAL

## 2024-04-24 ENCOUNTER — APPOINTMENT (OUTPATIENT)
Dept: LAB | Facility: AMBULARY SURGERY CENTER | Age: 59
End: 2024-04-24
Payer: COMMERCIAL

## 2024-04-24 ENCOUNTER — APPOINTMENT (OUTPATIENT)
Dept: RADIOLOGY | Facility: HOSPITAL | Age: 59
End: 2024-04-24
Payer: COMMERCIAL

## 2024-04-24 DIAGNOSIS — C18.7 MALIGNANT NEOPLASM OF SIGMOID COLON (HCC): ICD-10-CM

## 2024-04-24 DIAGNOSIS — N17.9 AKI (ACUTE KIDNEY INJURY) (HCC): ICD-10-CM

## 2024-04-24 DIAGNOSIS — R22.9 LUMP OF SKIN: ICD-10-CM

## 2024-04-24 DIAGNOSIS — IMO0002 CREATININE ELEVATION: ICD-10-CM

## 2024-04-24 LAB — CEA SERPL-MCNC: 5.3 NG/ML (ref 0–3)

## 2024-04-24 PROCEDURE — 82378 CARCINOEMBRYONIC ANTIGEN: CPT

## 2024-04-24 PROCEDURE — 76536 US EXAM OF HEAD AND NECK: CPT

## 2024-04-25 ENCOUNTER — ANESTHESIA EVENT (OUTPATIENT)
Dept: ANESTHESIOLOGY | Facility: HOSPITAL | Age: 59
End: 2024-04-25

## 2024-04-25 ENCOUNTER — ANESTHESIA (OUTPATIENT)
Dept: ANESTHESIOLOGY | Facility: HOSPITAL | Age: 59
End: 2024-04-25

## 2024-04-29 ENCOUNTER — ANESTHESIA EVENT (OUTPATIENT)
Dept: PERIOP | Facility: AMBULARY SURGERY CENTER | Age: 59
End: 2024-04-29
Payer: COMMERCIAL

## 2024-05-03 DIAGNOSIS — Z51.5 PALLIATIVE CARE PATIENT: ICD-10-CM

## 2024-05-03 DIAGNOSIS — R51.9 SCALP PAIN: ICD-10-CM

## 2024-05-03 DIAGNOSIS — C18.7 MALIGNANT NEOPLASM OF SIGMOID COLON (HCC): ICD-10-CM

## 2024-05-03 DIAGNOSIS — G89.3 CANCER RELATED PAIN: ICD-10-CM

## 2024-05-03 DIAGNOSIS — N13.39 OTHER HYDRONEPHROSIS: ICD-10-CM

## 2024-05-03 DIAGNOSIS — N20.1 URETERAL CALCULI: ICD-10-CM

## 2024-05-03 RX ORDER — PHENAZOPYRIDINE HYDROCHLORIDE 200 MG/1
200 TABLET, FILM COATED ORAL 3 TIMES DAILY PRN
Qty: 30 TABLET | Refills: 3 | Status: SHIPPED | OUTPATIENT
Start: 2024-05-03

## 2024-05-03 RX ORDER — OXYBUTYNIN CHLORIDE 5 MG/1
5 TABLET ORAL EVERY 6 HOURS PRN
Qty: 30 TABLET | Refills: 0 | Status: SHIPPED | OUTPATIENT
Start: 2024-05-03

## 2024-05-03 RX ORDER — ACETAMINOPHEN 500 MG
1000 TABLET ORAL 3 TIMES DAILY
Qty: 180 TABLET | Refills: 0 | OUTPATIENT
Start: 2024-05-03

## 2024-05-03 RX ORDER — ACETAMINOPHEN 500 MG
1000 TABLET ORAL 3 TIMES DAILY PRN
Qty: 180 TABLET | Refills: 11 | Status: SHIPPED | OUTPATIENT
Start: 2024-05-03

## 2024-05-03 NOTE — TELEPHONE ENCOUNTER
Patient not seen since 11/2023 w/ no f/up scheduled. 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 If she would like to schedule a f/up with our team, that would also be fine.

## 2024-05-08 RX ORDER — OXYCODONE HYDROCHLORIDE 5 MG/1
5 TABLET ORAL EVERY 4 HOURS PRN
Status: ON HOLD | COMMUNITY
End: 2024-05-10

## 2024-05-08 NOTE — PRE-PROCEDURE INSTRUCTIONS
Pre-Surgery Instructions:   Medication Instructions    acetaminophen (TYLENOL) 500 mg tablet Uses PRN- OK to take day of surgery    amLODIPine (NORVASC) 10 mg tablet Take day of surgery.    cholecalciferol 400 units tablet Inst to use hold until after sx    cyanocobalamin (VITAMIN B-12) 1000 MCG tablet Inst to hold until after sx    fenofibrate (TRICOR) 145 mg tablet Take night before surgery    ferrous sulfate 324 (65 Fe) mg Hold day of surgery.    oxybutynin (DITROPAN) 5 mg tablet Hold day of surgery.    oxyCODONE (ROXICODONE) 5 immediate release tablet Uses PRN- OK to take day of surgery    phenazopyridine (PYRIDIUM) 200 mg tablet Uses PRN- OK to take day of surgery    potassium chloride (MICRO-K) 10 MEQ CR capsule Hold day of surgery.    predniSONE 5 mg tablet Take day of surgery    tamsulosin (FLOMAX) 0.4 mg Take night before surgery    ursodiol (ACTIGALL) 300 mg capsule Take day of surgery.    Medication instructions for day surgery reviewed w/ pts  as directed by pt in chart. Please use only a sip of water to take your instructed medications. Avoid aspirin and all over the counter vitamins, supplements and NSAIDS for one week prior to surgery per anesthesia guidelines. Tylenol is ok to take as needed.     You will receive a call one business day prior to surgery with an arrival time and hospital directions. If your surgery is scheduled on a Monday, the hospital will be calling you on the Friday prior to your surgery. If you have not heard from anyone by 8pm, please call the hospital supervisor through the hospital  at [phone]. ([place] [phone] or [place] [phone]).    Do not eat or drink anything after midnight the night before your surgery, including candy, mints, lifesavers, or chewing gum. Do not drink alcohol 24hrs before your surgery. Try not to smoke at least 24hrs before your surgery.       Follow the pre surgery showering instructions as listed in the “My Surgical  Experience Booklet” or otherwise provided by your surgeon's office. Do not use a blade to shave the surgical area 1 week before surgery. It is okay to use a clean electric clippers up to 24 hours before surgery. Do not apply any lotions, creams, including makeup, cologne, deodorant, or perfumes after showering on the day of your surgery. Do not use dry shampoo, hair spray, hair gel, or any type of hair products.     No contact lenses, eye make-up, or artificial eyelashes. Remove nail polish, including gel polish, and any artificial, gel, or acrylic nails if possible. Remove all jewelry including rings and body piercing jewelry.     Wear causal clothing that is easy to take on and off. Consider your type of surgery.    Keep any valuables, jewelry, piercings at home. Please bring any specially ordered equipment (sling, braces) if indicated.    Arrange for a responsible person to drive you to and from the hospital on the day of your surgery. Please confirm the visitor policy for the day of your procedure when you receive your phone call with an arrival time.     Call the surgeon's office with any new illnesses, exposures, or additional questions prior to surgery.    Please reference your “My Surgical Experience Booklet” for additional information to prepare for your upcoming surgery.

## 2024-05-09 ENCOUNTER — HOSPITAL ENCOUNTER (OUTPATIENT)
Dept: INFUSION CENTER | Facility: CLINIC | Age: 59
Discharge: HOME | End: 2024-05-09
Payer: COMMERCIAL

## 2024-05-09 DIAGNOSIS — D86.89 HEPATIC GRANULOMA ASSOCIATED WITH SARCOIDOSIS: ICD-10-CM

## 2024-05-09 DIAGNOSIS — Z95.828 PORT-A-CATH IN PLACE: Primary | ICD-10-CM

## 2024-05-09 LAB
ALBUMIN SERPL BCP-MCNC: 3.9 G/DL (ref 3.5–5)
ALP SERPL-CCNC: 104 U/L (ref 34–104)
ALT SERPL W P-5'-P-CCNC: 58 U/L (ref 7–52)
ANION GAP SERPL CALCULATED.3IONS-SCNC: 6 MMOL/L (ref 4–13)
AST SERPL W P-5'-P-CCNC: 40 U/L (ref 13–39)
BASOPHILS # BLD AUTO: 0.02 THOUSANDS/ÂΜL (ref 0–0.1)
BASOPHILS NFR BLD AUTO: 0 % (ref 0–1)
BILIRUB SERPL-MCNC: 0.63 MG/DL (ref 0.2–1)
BUN SERPL-MCNC: 31 MG/DL (ref 5–25)
CALCIUM SERPL-MCNC: 10.8 MG/DL (ref 8.4–10.2)
CHLORIDE SERPL-SCNC: 104 MMOL/L (ref 96–108)
CO2 SERPL-SCNC: 25 MMOL/L (ref 21–32)
CREAT SERPL-MCNC: 1.27 MG/DL (ref 0.6–1.3)
CRP SERPL QL: 6.5 MG/L
EOSINOPHIL # BLD AUTO: 0.28 THOUSAND/ÂΜL (ref 0–0.61)
EOSINOPHIL NFR BLD AUTO: 5 % (ref 0–6)
ERYTHROCYTE [DISTWIDTH] IN BLOOD BY AUTOMATED COUNT: 14 % (ref 11.6–15.1)
ERYTHROCYTE [SEDIMENTATION RATE] IN BLOOD: 16 MM/HOUR (ref 0–29)
GFR SERPL CREATININE-BSD FRML MDRD: 47 ML/MIN/1.73SQ M
GLUCOSE SERPL-MCNC: 118 MG/DL (ref 65–140)
HCT VFR BLD AUTO: 32.5 % (ref 34.8–46.1)
HGB BLD-MCNC: 10.1 G/DL (ref 11.5–15.4)
IMM GRANULOCYTES # BLD AUTO: 0.02 THOUSAND/UL (ref 0–0.2)
IMM GRANULOCYTES NFR BLD AUTO: 0 % (ref 0–2)
INR PPP: 1 (ref 0.84–1.19)
LYMPHOCYTES # BLD AUTO: 0.33 THOUSANDS/ÂΜL (ref 0.6–4.47)
LYMPHOCYTES NFR BLD AUTO: 5 % (ref 14–44)
MCH RBC QN AUTO: 27.2 PG (ref 26.8–34.3)
MCHC RBC AUTO-ENTMCNC: 31.1 G/DL (ref 31.4–37.4)
MCV RBC AUTO: 88 FL (ref 82–98)
MONOCYTES # BLD AUTO: 0.42 THOUSAND/ÂΜL (ref 0.17–1.22)
MONOCYTES NFR BLD AUTO: 7 % (ref 4–12)
NEUTROPHILS # BLD AUTO: 5 THOUSANDS/ÂΜL (ref 1.85–7.62)
NEUTS SEG NFR BLD AUTO: 83 % (ref 43–75)
NRBC BLD AUTO-RTO: 0 /100 WBCS
PLATELET # BLD AUTO: 224 THOUSANDS/UL (ref 149–390)
PMV BLD AUTO: 8.9 FL (ref 8.9–12.7)
POTASSIUM SERPL-SCNC: 4.2 MMOL/L (ref 3.5–5.3)
PROT SERPL-MCNC: 6.6 G/DL (ref 6.4–8.4)
PROTHROMBIN TIME: 13.8 SECONDS (ref 11.6–14.5)
RBC # BLD AUTO: 3.71 MILLION/UL (ref 3.81–5.12)
SODIUM SERPL-SCNC: 135 MMOL/L (ref 135–147)
WBC # BLD AUTO: 6.07 THOUSAND/UL (ref 4.31–10.16)

## 2024-05-09 PROCEDURE — 80053 COMPREHEN METABOLIC PANEL: CPT

## 2024-05-09 PROCEDURE — 85610 PROTHROMBIN TIME: CPT

## 2024-05-09 PROCEDURE — 85025 COMPLETE CBC W/AUTO DIFF WBC: CPT

## 2024-05-09 PROCEDURE — 82164 ANGIOTENSIN I ENZYME TEST: CPT

## 2024-05-09 PROCEDURE — 85652 RBC SED RATE AUTOMATED: CPT

## 2024-05-09 PROCEDURE — 86140 C-REACTIVE PROTEIN: CPT

## 2024-05-09 NOTE — PROGRESS NOTES
Patient arrives to infusion center for lab draw and port flush. R PAC accessed without issue, brisk blood return noted. Labs drawn and sent as per orders. Port flushed well without resistance. Deaccessed, bandaid in place. Patient has been going to OP labs q2w - patient she may schedule appointments with us for q2w labs if she desires upon DC with . Patient aware, ambulatory upon DC without gait disturbance noted.   
Normal vision: sees adequately in most situations; can see medication labels, newsprint

## 2024-05-10 ENCOUNTER — APPOINTMENT (OUTPATIENT)
Dept: RADIOLOGY | Facility: AMBULARY SURGERY CENTER | Age: 59
End: 2024-05-10
Payer: COMMERCIAL

## 2024-05-10 ENCOUNTER — ANESTHESIA (OUTPATIENT)
Dept: PERIOP | Facility: AMBULARY SURGERY CENTER | Age: 59
End: 2024-05-10
Payer: COMMERCIAL

## 2024-05-10 ENCOUNTER — HOSPITAL ENCOUNTER (OUTPATIENT)
Facility: AMBULARY SURGERY CENTER | Age: 59
Setting detail: OUTPATIENT SURGERY
Discharge: HOME | End: 2024-05-10
Attending: UROLOGY | Admitting: UROLOGY
Payer: COMMERCIAL

## 2024-05-10 VITALS
SYSTOLIC BLOOD PRESSURE: 138 MMHG | OXYGEN SATURATION: 92 % | HEIGHT: 62 IN | HEART RATE: 94 BPM | TEMPERATURE: 96.7 F | DIASTOLIC BLOOD PRESSURE: 71 MMHG | WEIGHT: 133 LBS | RESPIRATION RATE: 16 BRPM | BODY MASS INDEX: 24.48 KG/M2

## 2024-05-10 DIAGNOSIS — N13.39 OTHER HYDRONEPHROSIS: ICD-10-CM

## 2024-05-10 DIAGNOSIS — R10.2 VAGINAL PAIN: Primary | ICD-10-CM

## 2024-05-10 DIAGNOSIS — N20.1 URETERAL CALCULI: ICD-10-CM

## 2024-05-10 DIAGNOSIS — N13.5 URETERAL STRICTURE: ICD-10-CM

## 2024-05-10 LAB — ACE SERPL-CCNC: 106 U/L (ref 14–82)

## 2024-05-10 PROCEDURE — NC001 PR NO CHARGE: Performed by: UROLOGY

## 2024-05-10 PROCEDURE — C1758 CATHETER, URETERAL: HCPCS | Performed by: UROLOGY

## 2024-05-10 PROCEDURE — C1769 GUIDE WIRE: HCPCS | Performed by: UROLOGY

## 2024-05-10 PROCEDURE — C2617 STENT, NON-COR, TEM W/O DEL: HCPCS | Performed by: UROLOGY

## 2024-05-10 PROCEDURE — 52332 CYSTOSCOPY AND TREATMENT: CPT | Performed by: UROLOGY

## 2024-05-10 PROCEDURE — 74420 UROGRAPHY RTRGR +-KUB: CPT

## 2024-05-10 DEVICE — INLAY OPTIMA URETERAL STENT W/O GUIDEWIRE
Type: IMPLANTABLE DEVICE | Status: FUNCTIONAL
Brand: BARD® INLAY OPTIMA® URETERAL STENT

## 2024-05-10 RX ORDER — PHENAZOPYRIDINE HYDROCHLORIDE 200 MG/1
200 TABLET, FILM COATED ORAL 3 TIMES DAILY PRN
Qty: 30 TABLET | Refills: 0 | Status: SHIPPED | OUTPATIENT
Start: 2024-05-10

## 2024-05-10 RX ORDER — LIDOCAINE HYDROCHLORIDE 10 MG/ML
INJECTION, SOLUTION EPIDURAL; INFILTRATION; INTRACAUDAL; PERINEURAL AS NEEDED
Status: DISCONTINUED | OUTPATIENT
Start: 2024-05-10 | End: 2024-05-10

## 2024-05-10 RX ORDER — CEFAZOLIN SODIUM 1 G/50ML
1000 SOLUTION INTRAVENOUS
Status: COMPLETED | OUTPATIENT
Start: 2024-05-10 | End: 2024-05-10

## 2024-05-10 RX ORDER — FENTANYL CITRATE/PF 50 MCG/ML
50 SYRINGE (ML) INJECTION
Status: DISCONTINUED | OUTPATIENT
Start: 2024-05-10 | End: 2024-05-10 | Stop reason: HOSPADM

## 2024-05-10 RX ORDER — DEXAMETHASONE SODIUM PHOSPHATE 10 MG/ML
INJECTION, SOLUTION INTRAMUSCULAR; INTRAVENOUS AS NEEDED
Status: DISCONTINUED | OUTPATIENT
Start: 2024-05-10 | End: 2024-05-10

## 2024-05-10 RX ORDER — DOCUSATE SODIUM 100 MG/1
100 CAPSULE, LIQUID FILLED ORAL 2 TIMES DAILY
Qty: 30 CAPSULE | Refills: 0 | Status: SHIPPED | OUTPATIENT
Start: 2024-05-10 | End: 2024-05-25

## 2024-05-10 RX ORDER — ONDANSETRON 2 MG/ML
4 INJECTION INTRAMUSCULAR; INTRAVENOUS ONCE AS NEEDED
Status: DISCONTINUED | OUTPATIENT
Start: 2024-05-10 | End: 2024-05-10 | Stop reason: HOSPADM

## 2024-05-10 RX ORDER — MIDAZOLAM HYDROCHLORIDE 2 MG/2ML
INJECTION, SOLUTION INTRAMUSCULAR; INTRAVENOUS AS NEEDED
Status: DISCONTINUED | OUTPATIENT
Start: 2024-05-10 | End: 2024-05-10

## 2024-05-10 RX ORDER — ACETAMINOPHEN 325 MG/1
650 TABLET ORAL EVERY 4 HOURS PRN
Start: 2024-05-10

## 2024-05-10 RX ORDER — NAPROXEN 500 MG/1
500 TABLET ORAL 2 TIMES DAILY WITH MEALS
Qty: 10 TABLET | Refills: 0 | Status: SHIPPED | OUTPATIENT
Start: 2024-05-10 | End: 2024-05-15

## 2024-05-10 RX ORDER — SODIUM CHLORIDE, SODIUM LACTATE, POTASSIUM CHLORIDE, CALCIUM CHLORIDE 600; 310; 30; 20 MG/100ML; MG/100ML; MG/100ML; MG/100ML
INJECTION, SOLUTION INTRAVENOUS CONTINUOUS PRN
Status: DISCONTINUED | OUTPATIENT
Start: 2024-05-10 | End: 2024-05-10

## 2024-05-10 RX ORDER — OXYCODONE HYDROCHLORIDE 5 MG/1
10 TABLET ORAL EVERY 4 HOURS PRN
Status: DISCONTINUED | OUTPATIENT
Start: 2024-05-10 | End: 2024-05-10 | Stop reason: HOSPADM

## 2024-05-10 RX ORDER — PROPOFOL 10 MG/ML
INJECTION, EMULSION INTRAVENOUS AS NEEDED
Status: DISCONTINUED | OUTPATIENT
Start: 2024-05-10 | End: 2024-05-10

## 2024-05-10 RX ORDER — SODIUM CHLORIDE, SODIUM LACTATE, POTASSIUM CHLORIDE, CALCIUM CHLORIDE 600; 310; 30; 20 MG/100ML; MG/100ML; MG/100ML; MG/100ML
100 INJECTION, SOLUTION INTRAVENOUS CONTINUOUS
Status: DISCONTINUED | OUTPATIENT
Start: 2024-05-10 | End: 2024-05-10 | Stop reason: HOSPADM

## 2024-05-10 RX ORDER — OXYCODONE HYDROCHLORIDE 5 MG/1
5 TABLET ORAL EVERY 4 HOURS PRN
Status: DISCONTINUED | OUTPATIENT
Start: 2024-05-10 | End: 2024-05-10 | Stop reason: HOSPADM

## 2024-05-10 RX ORDER — HYDROMORPHONE HCL/PF 1 MG/ML
0.2 SYRINGE (ML) INJECTION
Status: DISCONTINUED | OUTPATIENT
Start: 2024-05-10 | End: 2024-05-10 | Stop reason: HOSPADM

## 2024-05-10 RX ORDER — SODIUM CHLORIDE, SODIUM LACTATE, POTASSIUM CHLORIDE, CALCIUM CHLORIDE 600; 310; 30; 20 MG/100ML; MG/100ML; MG/100ML; MG/100ML
50 INJECTION, SOLUTION INTRAVENOUS CONTINUOUS
Status: CANCELLED | OUTPATIENT
Start: 2024-05-10

## 2024-05-10 RX ORDER — OXYCODONE HYDROCHLORIDE 5 MG/1
5 TABLET ORAL EVERY 4 HOURS PRN
Qty: 10 TABLET | Refills: 0 | Status: SHIPPED | OUTPATIENT
Start: 2024-05-10 | End: 2024-05-20

## 2024-05-10 RX ORDER — CONJUGATED ESTROGENS 0.62 MG/G
0.3 CREAM VAGINAL 3 TIMES WEEKLY
Qty: 30 G | Refills: 6 | Status: SHIPPED | OUTPATIENT
Start: 2024-05-10

## 2024-05-10 RX ORDER — HYDROMORPHONE HCL/PF 1 MG/ML
0.5 SYRINGE (ML) INJECTION EVERY 2 HOUR PRN
Status: DISCONTINUED | OUTPATIENT
Start: 2024-05-10 | End: 2024-05-10 | Stop reason: HOSPADM

## 2024-05-10 RX ORDER — FENTANYL CITRATE 50 UG/ML
INJECTION, SOLUTION INTRAMUSCULAR; INTRAVENOUS AS NEEDED
Status: DISCONTINUED | OUTPATIENT
Start: 2024-05-10 | End: 2024-05-10

## 2024-05-10 RX ORDER — ONDANSETRON 2 MG/ML
INJECTION INTRAMUSCULAR; INTRAVENOUS AS NEEDED
Status: DISCONTINUED | OUTPATIENT
Start: 2024-05-10 | End: 2024-05-10

## 2024-05-10 RX ORDER — OXYBUTYNIN CHLORIDE 5 MG/1
5 TABLET ORAL EVERY 6 HOURS PRN
Qty: 30 TABLET | Refills: 0 | Status: SHIPPED | OUTPATIENT
Start: 2024-05-10

## 2024-05-10 RX ADMIN — SODIUM CHLORIDE, SODIUM LACTATE, POTASSIUM CHLORIDE, AND CALCIUM CHLORIDE: .6; .31; .03; .02 INJECTION, SOLUTION INTRAVENOUS at 10:53

## 2024-05-10 RX ADMIN — FENTANYL CITRATE 25 MCG: 50 INJECTION INTRAMUSCULAR; INTRAVENOUS at 12:32

## 2024-05-10 RX ADMIN — PROPOFOL 150 MG: 10 INJECTION, EMULSION INTRAVENOUS at 12:19

## 2024-05-10 RX ADMIN — CEFAZOLIN SODIUM 1000 MG: 1 SOLUTION INTRAVENOUS at 12:22

## 2024-05-10 RX ADMIN — FENTANYL CITRATE 25 MCG: 50 INJECTION INTRAMUSCULAR; INTRAVENOUS at 12:22

## 2024-05-10 RX ADMIN — DEXAMETHASONE SODIUM PHOSPHATE 10 MG: 10 INJECTION INTRAMUSCULAR; INTRAVENOUS at 12:22

## 2024-05-10 RX ADMIN — MIDAZOLAM 2 MG: 1 INJECTION INTRAMUSCULAR; INTRAVENOUS at 12:14

## 2024-05-10 RX ADMIN — FENTANYL CITRATE 25 MCG: 50 INJECTION INTRAMUSCULAR; INTRAVENOUS at 12:19

## 2024-05-10 RX ADMIN — FENTANYL CITRATE 25 MCG: 50 INJECTION INTRAMUSCULAR; INTRAVENOUS at 12:26

## 2024-05-10 RX ADMIN — LIDOCAINE HYDROCHLORIDE 50 MG: 10 INJECTION, SOLUTION EPIDURAL; INFILTRATION; INTRACAUDAL at 12:19

## 2024-05-10 RX ADMIN — ONDANSETRON 4 MG: 2 INJECTION INTRAMUSCULAR; INTRAVENOUS at 12:22

## 2024-05-10 NOTE — H&P
UROLOGY HISTORY AND PHYSICAL     Patient Identifiers: Danielle Haque (MRN [id number])      Date of Service: 5/10/2024        ASSESSMENT:     58 y.o. old female with complex stone disease and bilateral ureteral stricture disease presents for ureteral stent exchange.      PLAN:     Otoscopy with bilateral ureteral stent exchange      History of Present Illness:     Danielle Haque is a 58 y.o. old with a history of ureteral strictures and complex stones    Past Medical, Past Surgical History:     Past Medical History:   Diagnosis Date    Bleeding from colostomy stoma (HCC)      states ileostomy , not colostomy    Cancer (HCC)     Colon cancer (HCC)     Fall     Headache     Hemochromatosis, unspecified     History of transfusion     2022 - no adverse reaction    Hypertension     Kidney calculi     Kidney stone     Liver disease     hemangioma    Muscle weakness     Personal history of COVID-19 12/2022    Sarcoidosis     Seizures (HCC)     2022    Shingles    :    Past Surgical History:   Procedure Laterality Date    ABSCESS DRAINAGE      left breast    BREAST BIOPSY      CHEST TUBE INSERTION      COLON SURGERY      colostomy    COLONOSCOPY      FL GUIDED CENTRAL VENOUS ACCESS DEVICE INSERTION  03/21/2023    FL RETROGRADE PYELOGRAM  01/23/2023    FL RETROGRADE PYELOGRAM  04/03/2023    FL RETROGRADE PYELOGRAM  7/21/2023    FL RETROGRADE PYELOGRAM  10/11/2023    FL RETROGRADE PYELOGRAM  12/15/2023    FL RETROGRADE PYELOGRAM  2/8/2024    IR BIOPSY LIVER MASS  05/09/2023    LUNG BIOPSY      MI CYSTO BLADDER W/URETERAL CATHETERIZATION Bilateral 07/21/2023    Procedure: CYSTOSCOPY URETEROSCOPY RETROGRADE PYELOGRAM WITH BILATERAL STENT URETERAL EXCHANGE; LEFT LASER LITHOTRIPSY AND STONE BASKET RETRIEVAL;  Surgeon: José Luis Stephens MD;  Location: [facility];  Service: Urology    MI CYSTO BLADDER W/URETERAL CATHETERIZATION Bilateral 12/15/2023    Procedure: CYSTOSCOPY, BILATERAL  RETROGRADE PYELOGRAM , STENT  EXCHANGE RIGHT , LEFT URETEROSCOPY ,  HOLMIUM LASER WITH INSERTION LEFT URETERAL STENT;  Surgeon: Derick Bhakta MD;  Location: [facility];  Service: Urology    VT CYSTO BLADDER W/URETERAL CATHETERIZATION Left 2/8/2024    Procedure: CYSTOSCOPY B/L RETROGRADE PYELOGRAM WITH B/L T URETERALSTENT EXCHANGE,LEFT URETEROSCOPY, DILATION LEFT URETERAL STICTURE;  Surgeon: José Luis Stephens MD;  Location[facility];  Service: Urology    VT CYSTO/URETERO W/LITHOTRIPSY &INDWELL STENT INSRT Bilateral 01/23/2023    Procedure: CYSTOSCOPY  RIGHT URETEROSCOPY WITH LITHOTRIPSY HOLMIUM LASER, BILATERAL RETROGRADE PYELOGRAM AND BILATERAL  URETERAL STENT INSERTION;  Surgeon: José Luis Stephens MD;  Location[facility];  Service: Urology    VT CYSTO/URETERO W/LITHOTRIPSY &INDWELL STENT INSRT Right 04/03/2023    Procedure: RIGHT URETEROSCOPY WITH LITHOTRIPSY HOLMIUM LASER, RETROGRADE PYELOGRAM; BILATERAL EXCHANGE STENT URETERAL;  Surgeon: José Luis Stephens MD;  [facility];  Service: Urology    VT CYSTO/URETERO W/LITHOTRIPSY &INDWELL STENT INSRT Bilateral 10/11/2023    Procedure: CYSTOSCOPY URETEROSCOPY RETROGRADE PYELOGRAM AND INSERTION STENT URETERAL DIAGNOSTINC RIGHT URETEROSCOPY, REMOVAL STENT LEFT SIDE;  Surgeon: José Luis Stephens MD;  [facility];  Service: Urology    VT INSJ TUNNELED CTR VAD W/SUBQ PORT AGE 5 YR/> N/A 03/21/2023    Procedure: INSERTION VENOUS PORT ( PORT-A-CATH) IR;  Surgeon: Mark Amos DO;  [facility];  Service: Interventional Radiology    VT LAPS COLECTOMY PRTL W/COLOPXTSTMY LW ANAST N/A 8/17/2023    Procedure: RESECTION COLON LOW ANTERIOR LAPAROSCOPIC WITH ROBOTICS;  Surgeon: Sree Umanzor MD;  Location: [facility];  Service: Colorectal    TONSILLECTOMY      URINARY SURGERY Bilateral     bilateral stents   :    Medications, Allergies:   No current facility-administered medications for this encounter.    Facility-Administered Medications Ordered  "in Other Encounters:     [MAR Hold] alteplase (CATHFLO) injection 2 mg, 2 mg, Intracatheter, Q1MIN PRN, Al Ferrera MD    lactated ringers infusion, , Intravenous, Continuous PRN, Aydee Escobar CRNA, New Bag at 05/10/24 1053    Allergies:  Allergies   Allergen Reactions    Oxaliplatin Shortness Of Breath     Reactions occurred with 2nd and 3rd infusions (about 1-3 hours from initiation of infusion) and required treatment with steroids and antihistamines. Please refer to allergy note on 2/7/2023 for detailed description of her reactions.    Morphine Nausea Only     \"Every dose made me nauseous\" (oral dosing only, can tolerate IV morphine)    Potassium Chloride Other (See Comments)     Pt reports \"burning\" with Iv potassium administration and wishes for it to be added to chart   :    Social and Family History:   Social History:   Social History     Tobacco Use    Smoking status: Never     Passive exposure: Past    Smokeless tobacco: Never   Vaping Use    Vaping status: Never Used   Substance Use Topics    Alcohol use: Never    Drug use: Never   .    Social History     Tobacco Use   Smoking Status Never    Passive exposure: Past   Smokeless Tobacco Never       Family History:  Family History   Problem Relation Age of Onset    Cancer Mother     Cirrhosis Father     Breast cancer Neg Hx     Breast cancer additional onset Neg Hx    :     Review of Systems:     General: Fever, chills, or night sweats: negative  Cardiac: Negative for chest pain.    Pulmonary: Negative for shortness of breath.  Gastrointestinal: Abdominal pain negative  Nausea, vomiting, or diarrhea negative  Genitourinary: See HPI above.  Patient does nothave hematuria.  All other systems queried were negative.    Physical Exam:   General: Patient is pleasant and in NAD. Awake and alert  /63   Pulse 83   Temp (!) 97.3 °F (36.3 °C) (Temporal)   Resp 18   Ht 5' 2\" (1.575 m)   Wt 60.3 kg (133 lb)   SpO2 96%   BMI 24.33 kg/m²   HEENT:  " Normocephalic atraumatic  Cardiac:  Regular rate and rhythm, Peripheral edema: negative  Pulmonary: Non-labored breathing, CTAB  Abdomen: Soft, non-tender, non-distended.  No surgical scars.  No masses, tenderness, hernias noted.    Genitourinary: negative CVA tenderness, neg suprapubic tenderness.  Extremities: normal movement in all 4       Labs:     Lab Results   Component Value Date    HGB 10.1 (L) 05/09/2024    HCT 32.5 (L) 05/09/2024    WBC 6.07 05/09/2024     05/09/2024   ]    Lab Results   Component Value Date    K 4.2 05/09/2024     05/09/2024    CO2 25 05/09/2024    BUN 31 (H) 05/09/2024    CREATININE 1.27 05/09/2024    CALCIUM 10.8 (H) 05/09/2024   ]    Imaging:   I personally reviewed the images and report of the following studies, and reviewed them with the patient:        Thank you for allowing me to participate in this patients’ care.  Please do not hesitate to call with any additional questions.  José Luis Stephens MD

## 2024-05-10 NOTE — DISCHARGE INSTR - AVS FIRST PAGE
Danielle Haque:    Your surgery went very well.        Your ureteral stents were successfully exchanged today.  I did prescribe a topical estrogen placement cream which I hope will help with some of your urinary pain and irritation.  Please take your medications as prescribed with caution for comfort.  Most importantly please drink 6-8 glasses of water per day    Please call with any questions or concerns.    José Luis Stephens MD,PhD  [facility]  [phone]            WHAT IS A STENT?  At the end of the procedure, your doctor may place a stent into your ureter. A stent is a thin, flexible piece of plastic that will hold open your ureter while the remaining small pieces of stone pass. This allows your kidney to drain easily and prevents you from having to “pass” these small stone pieces on your own, which could be painful. The stent is about 12 inches long and looks and feels like a thin piece of spaghetti.    AFTER THE PROCEDURE  After the procedure you may experience the following symptoms. All of these are normal and should resolve within 1 or 2 days after your stent is removed.  Urinary frequency (urinating more often than usual)  Urinary urgency (the sensation that you need to urinate right away)  Painful urination (this can be pain in your bladder or in your back when  you urinate)  Blood in your urine ( a stent can irritate the lining of your bladder causing it to bleed)  Back/Flank pain, especially with urination  You will receive a prescription for narcotic pain medication after the procedure. You will also receive a prescription for tamsulosin which you will take once a day for 2 weeks to help relax your ureter and decrease stent discomfort. You will also need to purchase a stool softener (i.e. Colace) or mild laxative (i.e. Miralax) as the narcotic pain medication can make you constipated. This is important as constipation can exacerbate stent related symptoms.

## 2024-05-10 NOTE — OP NOTE
Operative Note     PATIENT:  Danielle Haque (MRN [id number])    DATE OF PROCEDURE:   5/10/2024    PRE-OP DIAGNOSIS:   1) bilateral ureteral stricture disease  2) complex bilateral nephrolithiasis  3) indwelling bilateral ureteral stents    POST-OP DIAGNOSIS:   1) bilateral ureteral stricture disease  2) complex bilateral nephrolithiasis  3) indwelling bilateral ureteral stents    PROCEDURES PERFORMED:  1) Cystoscopy  2) retrograde pyelography  3.  Bilateral ureteral stent exchange    SURGEON:  José Luis Stephens MD    ASSISTANTS:  There were no qualified teaching residents to assist with this case    ANESTHESIA: General/LMA     COMPLICATIONS:   None    ANTIBIOTICS:  Ancef    INTRAOPERATIVE THROMBOEMBOLISM PROPHYLAXIS:  Pneumatic compression stockings      FINDINGS:  Assess for bilateral ureteral stent exchange.  Right retrograde pyelogram demonstrates chronic hydronephrosis.  The hydronephrosis on the left side is largely resolved        PROCEDURE IN DETAIL:   The patient was identified and brought to the OR.  Antibiotic prophylaxis and DVT prophylaxis were administered.  They were placed in the comfortable dorsal lithotomy position with care to pad all pressure points.  They were prepped and draped in the usual sterile fashion using hibiclens.  A surgical time out was performed with all in the room in agreement with the correct patient, procedure, indications, and laterality.  A 21-E[place]ian rigid cystoscope was used to enter the bladder.  The bladder was inspected in its entirety and there were no lesions noted.  The ureteral orifices were identified in their normal orthotopic positions.     The Right ureteral orifice was identified and a 5 Fr open ended catheter was placed into the ureteral orifice.   The existing stent was removed.  A retrograde pyelogram was performed with injection of 50/50 Isovue with the findings as described above.  A Sensor wire was up to the kidney under fluoroscopic guidance.  The  open-ended catheter is navigated atop the pre-placed wire and advanced to the renal pelvis.       the distance between the UVJ and the UPJ was measured and appropriately sized stent was selected.  The JJ stent was then passed up the wire  under fluoroscopic guidance into the  kidney with a good curl noted in the kidney and in the bladder.   The bladder was drained.      The identical procedure was then carried out the contralateral left side.      The patient was placed back supine, awakened from general anesthesia and brought to recovery room in stable condition.    SPECIMENS:   No specimens collected during this procedure.     IMPLANTS:   Implant Name Type Inv. Item Serial No.  Lot No. LRB No. Used Action   STENT URETERAL 6 FR 26CM INLAY OPTIMA - [id number]  STENT URETERAL 6 FR 26CM INLAY OPTIMA  [employer] [id number] Left 1 Implanted   STENT URETERAL 6FR 24CML INLAY OPTIMA - [id number]  STENT URETERAL 6FR 24CML INLAY OPTIMA  [employer] [id number] Right 1 Implanted        COMPLICATIONS: None    DISPOSITION: PACU    PLAN:   I will schedule the patient for an office visit with me in 2 months.  She is scheduled for a surveillance CT for her rectal cancer prior to that which I will review and evaluate her residual stone disease    Patient is motivated to pursue interventions to avoid continued ureteral stent placement.  I suspect that she will remain stent dependent on the right and left she elects for a referral for a ureteroplasty.    She may be eligible for additional endoscopic procedures on the left to try to rid her stent on that side.

## 2024-05-10 NOTE — ANESTHESIA POSTPROCEDURE EVALUATION
Post-Op Assessment Note    CV Status:  Stable  Pain Score: 0    Pain management: adequate       Mental Status:  Alert and awake   Hydration Status:  Euvolemic   PONV Controlled:  Controlled   Airway Patency:  Patent     Post Op Vitals Reviewed: Yes    No anethesia notable event occurred.    Staff: CRNA               /72 (05/10/24 1252)    Temp 97.6 °F (36.4 °C) (05/10/24 1252)    Pulse 81 (05/10/24 1252)   Resp 15 (05/10/24 1252)    SpO2 98 % (05/10/24 1252)

## 2024-05-10 NOTE — ANESTHESIA PREPROCEDURE EVALUATION
Procedure:  CYSTOSCOPY RETROGRADE PYELOGRAM WITH INSERTION STENT URETERAL (Bilateral: Bladder)  EXCHANGE STENT URETERAL (Bilateral: Ureter)    Relevant Problems   CARDIO   (+) Chest pain   (+) Hypertension   (+) Nonrheumatic mitral valve regurgitation      GI/HEPATIC   (+) Malignant neoplasm of sigmoid colon (HCC)   (+) Partial small bowel obstruction (HCC)   (+) Rectal cancer (HCC)      /RENAL   (+) THOR (acute kidney injury) (HCC)   (+) Hydronephrosis due to ureteral stricture   (+) Kidney calculi      HEMATOLOGY   (+) Iron deficiency anemia   (+) Other specified anemias      NEURO/PSYCH   (+) Anxiety about health        Physical Exam    Airway    Mallampati score: III  TM Distance: >3 FB  Neck ROM: full     Dental       Cardiovascular      Pulmonary      Other Findings  post-pubertal.      Anesthesia Plan  ASA Score- 3     Anesthesia Type- general with ASA Monitors.         Additional Monitors:     Airway Plan: LMA.           Plan Factors-    Chart reviewed. EKG reviewed.  Existing labs reviewed. Patient summary reviewed.                  Induction- intravenous.    Postoperative Plan-     Informed Consent- Anesthetic plan and risks discussed with patient.  I personally reviewed this patient with the CRNA. Discussed and agreed on the Anesthesia Plan with the CRNA..

## 2024-05-21 DIAGNOSIS — E87.6 HYPOKALEMIA: ICD-10-CM

## 2024-05-22 RX ORDER — POTASSIUM CHLORIDE 750 MG/1
40 CAPSULE, EXTENDED RELEASE ORAL 2 TIMES DAILY
Qty: 240 CAPSULE | Refills: 1 | Status: SHIPPED | OUTPATIENT
Start: 2024-05-22 | End: 2024-07-21

## 2024-05-23 ENCOUNTER — OFFICE VISIT (OUTPATIENT)
Dept: HEMATOLOGY ONCOLOGY | Facility: CLINIC | Age: 59
End: 2024-05-23
Payer: COMMERCIAL

## 2024-05-23 VITALS
DIASTOLIC BLOOD PRESSURE: 80 MMHG | HEIGHT: 62 IN | BODY MASS INDEX: 24.29 KG/M2 | SYSTOLIC BLOOD PRESSURE: 122 MMHG | OXYGEN SATURATION: 97 % | WEIGHT: 132 LBS | HEART RATE: 107 BPM | TEMPERATURE: 98 F | RESPIRATION RATE: 18 BRPM

## 2024-05-23 DIAGNOSIS — C18.7 MALIGNANT NEOPLASM OF SIGMOID COLON (HCC): Primary | ICD-10-CM

## 2024-05-23 PROCEDURE — 99215 OFFICE O/P EST HI 40 MIN: CPT | Performed by: INTERNAL MEDICINE

## 2024-05-23 NOTE — PROGRESS NOTES
Danielle Haque  1965  [address] [facility] [place]  [address]  [place] [address]     DISCUSSION/SUMMARY:    58-year-old female previously diagnosed with rectal cancer.  Issues:     stones.  Patient has had a number of admissions with hematuria, obstructions and pain.  Stents are in place, patient has left-sided hydro utero nephrosclerosis.  Patient is followed by urology.  Mrs. Haque states that since the stents were placed, her pain is much less/better than before.  No recent hematuria.    Sarcoidosis.  Patient also being followed by hepatology as well as pulmonary.  Prednisone has been tapered.    Rectal cancer.  Initial clinical stage from another institution was cT3 cN0 cM0.  Patient required upfront diverting procedure - reason not entirely clear (patient did not know), presumed pain, bleeding, obstruction.  Mrs. Haque apparently was able to tolerate the first cycle of CapeOX but developed a severe reaction on the second cycle.  There are notes in Care Everywhere where the plan at [facility] was to desensitize the patient to oxaliplatin.  This was not attempted at [facility].    Case previously discussed with Dr. Edouard Ferrera, radiation oncology.  The plan was to move forward with long course chemo RT as long as there was no evidence for metastases.  Patient tolerated the concurrent 5-FU with RT.  Patient then went on to surgery.    NCCN guidelines 4.2022 state that for patients with T3, N any with clear CRM by MRI, neoadjuvant options include the standard chemotherapy for 3 to 4 months, long course chemo RT and then restaging with evaluation for resection.  Another option includes upfront long course chemo RT with either capecitabine or infusion 5-FU.    Pathology results are listed below; patient was found to have ypT2 [2.5 cm] ypN0 [0/17] disease.  At this time, there is no clear evidence for rectal cancer recurrence or  progression.  As discussed previously, there are pulmonary nodules and an adenopathy but is impossible determine if this is sarcoid or rectal cancer surveillance.  ACE level is slightly higher than before but so is the CEA level (5.3).  Patient/ demonstrated very good understanding of the situation; Mrs. Haque understands that the concern is patient developing rectal cancer recurrence.    Patient is to return in 3 months with repeat blood work including CEA before.    Prior left breast abscess.  Etiology unclear.  Patient was placed on antibiotics.  This has resolved.  Patient will continue to monitor.    Colostomy.  No recent GI problems, no GI bleeding, bowel movements are normal.  Patient will continue to monitor.  Patient has a pending appointment with colorectal surgery.    Pain control.  Patient states that her pain is better controlled now.    Mrs. Haque knows to call if she has any other oncology questions or concerns.    Carefully review your medication list and verify that the list is accurate and up-to-date. Please call the hematology/oncology office if there are medications missing from the list, medications on the list that you are not currently taking or if there is a dosage or instruction that is different from how you're taking that medication.    Patient goals and areas of care: Postoperative rectal cancer surveillance  Barriers to care: none  Patient is able to self-care  _____________________________________________________________________________________    Chief Complaint   Patient presents with    Follow-up    Rectal cancer status post MIRI, surgery and on surveillance     Oncology History   Malignant neoplasm of sigmoid colon (HCC)   10/28/2022 Biopsy    Colon, Rectosigmoid Colon Mass Biopsy (1 slide [id number] [facility], collected 10/28/2022):  - Adenocarcinoma arising in a tubular adenoma     12/1/2022 Biopsy    DIAGNOSIS LIVER, SEGMENT 3, RESECTION:  -  HYALINIZED SUBCAPSULAR AND INTRA-PARENCHYMAL NODULES. NO MALIGNANCY SEEN. SEE NOTE  - MILD MACROVESICULAR STEATOSIS (25%). NO EVIDENCE OF STEATOHEPATITIS. TRICHOME STAIN SHOWS MILD PORTAL FIBROSIS.   - 2+ IRON DEPOSITION WITHIN KUPFFER CELLS (IRON STAIN), CONSISTENT WITH SECONDARY HEMOCHROMATOSIS.     NOTE: THIS LIKELY REPRESENTS A MARKEDLY SCLEROTIC HEMANGIOMA OR CHANGES SECONDARY TO INJURY.               12/1/2022 Surgery    Laparoscopic diverting colostomy with liver biopsy. Dr. Serrano.      12/25/2022 Initial Diagnosis    Malignant neoplasm of sigmoid colon (HCC)     2022 - 1/10/2023 Chemotherapy    Oxaliplatin. [place], [place]     2/24/2023 -  Cancer Staged    Staging form: Colon and Rectum, AJCC 8th Edition  - Clinical: Stage IIA (cT3, cN0, cM0) - Signed by Edouard Ferrera MD on 2/24/2023  Total positive nodes: 0       4/17/2023 -  Chemotherapy    potassium chloride, 20 mEq, Intravenous, Once, 3 of 3 cycles  Administration: 20 mEq (4/17/2023), 20 mEq (4/24/2023), 20 mEq (5/1/2023)  alteplase (CATHFLO), 2 mg, Intracatheter, Every 1 Minute as needed, 6 of 6 cycles  fluorouracil (ADRUCIL) ambulatory infusion Soln, 225 mg/m2/day = 1,880 mg, Intravenous, Over 120 hours, 6 of 6 cycles     4/17/2023 - 5/25/2023 Radiation    Treatments:  Course: C1    Plan ID Energy Fractions Dose per Fraction (cGy) Dose Correction (cGy) Total Dose Delivered (cGy) Elapsed Days   CD Rectum 6X 3 / 3 180 0 540 2   Whole Pelvis 6X 25 / 25 180 0 4,500 35      Treatment Dates:  4/17/2023 - 5/25/2023.      Rectal cancer (HCC)   7/14/2023 Initial Diagnosis    Rectal cancer (HCC)     9/6/2023 -  Cancer Staged    Staging form: Colon and Rectum, AJCC 8th Edition  - Clinical: Stage IIA (cT3, cN0, cM0) - Signed by Edouard Ferrera MD on 9/6/2023  Total positive nodes: 0         History of Present Illness: 58-year-old female previously diagnosed with rectal adenocarcinoma recently moving into [place] and in need of a new  medical oncologist.  Patient has a complicated cancer history; turns for follow-up.    Patient was diagnosed with cT3 cN0 cM0 rectal adenocarcinoma in the [facility].  Although the specifics are not clear, patient required a diverting ileostomy.  Patient was then started on neoadjuvant CapeOx.  Patient was able to get through the first cycle but then had a reaction on the second cycle.  At that time, Mrs. Haque and her  decided to move to [place].  Patient was also treated with the Xeloda (amount not known) only but had not been treated for 2 months prior to being seen at [facility].  Patient also has a history of sarcoidosis, previously on steroids but now tapered off.    Options were previously discussed at length with Dr. Edouard Ferrera, radiation oncology and the plan was to proceed to concurrent chemoRT.  Patient received 5-FU with RT and then went on to surgery.  Patient returns for follow-up.    Mrs. Haque states feeling okay, better than before.  Since discontinuing the steroids patient feels better, has lost weight.  No respiratory issues.  No pain control issues.  Patient recently had 2 stents placed, prior pain is gone, no hematuria, no other  issues.  Colostomy is working well, no bleeding.  Appetite is good, weight is stable.  Previously patient was treated for a breast abscess, was on antibiotics.  No recent breast issues.  Patient states that overall her quality of life has been better recently.    Review of Systems   Constitutional:  Positive for fatigue. Negative for appetite change, chills and fever.   Respiratory:  Negative for apnea, cough and shortness of breath.    Cardiovascular:  Negative for chest pain.   Genitourinary:  Negative for dysuria and frequency.   Musculoskeletal:  Negative for arthralgias and back pain.   Skin: Negative.    Neurological:  Negative for light-headedness and headaches.   Psychiatric/Behavioral:  Negative for sleep disturbance.    All  other systems reviewed and are negative.    Patient Active Problem List   Diagnosis    Malignant neoplasm of sigmoid colon (HCC)    History of Clostridium difficile colitis    Other hemochromatosis    Hypertension    Sarcoidosis    Impaired fasting glucose    Hypokalemia    Transaminitis    Hydronephrosis due to ureteral stricture    Adrenal nodule (HCC)    Other specified anemias    Nonrheumatic mitral valve regurgitation    Advance directive discussed with patient    Skin lesion    Kidney calculi    Dysuria    Chest pain    Port-A-Cath in place    Drug-induced constipation    Palliative care patient    Cancer related pain    Rectal cancer (HCC)    Annual physical exam    Preop examination    Shingles    Partial small bowel obstruction (HCC)    Scalp pain    Left ear pain    Chronic anticoagulation    Neuralgia involving scalp    Anxiety about health    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    Ureteral stricture    Ureteral calculi with hydroureteronephrosis    Pulmonary nodules    Lump of skin    Iron deficiency anemia    THOR (acute kidney injury) (HCC)    Hypomagnesemia     Past Medical History:   Diagnosis Date    Bleeding from colostomy stoma (HCC)      states ileostomy , not colostomy    Cancer (HCC)     Colon cancer (HCC)     Fall     Headache     Hemochromatosis, unspecified     History of transfusion     2022 - no adverse reaction    Hypertension     Kidney calculi     Kidney stone     Liver disease     hemangioma    Muscle weakness     Personal history of COVID-19 12/2022    Sarcoidosis     Seizures (HCC)     2022    Shingles      Past Surgical History:   Procedure Laterality Date    ABSCESS DRAINAGE      left breast    BREAST BIOPSY      CHEST TUBE INSERTION      COLON SURGERY      colostomy    COLONOSCOPY      FL GUIDED CENTRAL VENOUS ACCESS DEVICE INSERTION  03/21/2023    FL RETROGRADE PYELOGRAM  01/23/2023    FL RETROGRADE PYELOGRAM  04/03/2023    FL RETROGRADE PYELOGRAM  7/21/2023     FL RETROGRADE PYELOGRAM  10/11/2023    FL RETROGRADE PYELOGRAM  12/15/2023    FL RETROGRADE PYELOGRAM  2/8/2024    FL RETROGRADE PYELOGRAM  5/10/2024    IR BIOPSY LIVER MASS  05/09/2023    LUNG BIOPSY      AL CYSTO BLADDER W/URETERAL CATHETERIZATION Bilateral 07/21/2023    Procedure: CYSTOSCOPY URETEROSCOPY RETROGRADE PYELOGRAM WITH BILATERAL STENT URETERAL EXCHANGE; LEFT LASER LITHOTRIPSY AND STONE BASKET RETRIEVAL;  Surgeon: José Luis Stephens MD;  [facility];  Service: Urology    AL CYSTO BLADDER W/URETERAL CATHETERIZATION Bilateral 12/15/2023    Procedure: CYSTOSCOPY, BILATERAL  RETROGRADE PYELOGRAM , STENT EXCHANGE RIGHT , LEFT URETEROSCOPY ,  HOLMIUM LASER WITH INSERTION LEFT URETERAL STENT;  Surgeon: Derick Bhakta MD;  [facility];  Service: Urology    AL CYSTO BLADDER W/URETERAL CATHETERIZATION Left 2/8/2024    Procedure: CYSTOSCOPY B/L RETROGRADE PYELOGRAM WITH B/L T URETERALSTENT EXCHANGE,LEFT URETEROSCOPY, DILATION LEFT URETERAL STICTURE;  Surgeon: José Luis Stephens MD;  [facility];  Service: Urology    AL CYSTO BLADDER W/URETERAL CATHETERIZATION Bilateral 5/10/2024    Procedure: CYSTOSCOPY RETROGRADE PYELOGRAM WITH INSERTION STENT URETERAL;  Surgeon: José Luis Stephens MD;  [facility];  Service: Urology    AL CYSTO W/INSERT URETERAL STENT Bilateral 5/10/2024    Procedure: EXCHANGE STENT URETERAL;  Surgeon: José Luis Stephens MD;  [facility];  Service: Urology    AL CYSTO/URETERO W/LITHOTRIPSY &INDWELL STENT INSRT Bilateral 01/23/2023    Procedure: CYSTOSCOPY  RIGHT URETEROSCOPY WITH LITHOTRIPSY HOLMIUM LASER, BILATERAL RETROGRADE PYELOGRAM AND BILATERAL  URETERAL STENT INSERTION;  Surgeon: José Luis Stephens MD;  [facility];  Service: Urology    AL CYSTO/URETERO W/LITHOTRIPSY &INDWELL STENT INSRT Right 04/03/2023    Procedure: RIGHT URETEROSCOPY WITH LITHOTRIPSY HOLMIUM LASER, RETROGRADE PYELOGRAM; BILATERAL  EXCHANGE STENT URETERAL;  Surgeon: José Luis Stephens MD;  [facility];  Service: Urology    IL CYSTO/URETERO W/LITHOTRIPSY &INDWELL STENT INSRT Bilateral 10/11/2023    Procedure: CYSTOSCOPY URETEROSCOPY RETROGRADE PYELOGRAM AND INSERTION STENT URETERAL DIAGNOSTINC RIGHT URETEROSCOPY, REMOVAL STENT LEFT SIDE;  Surgeon: José Luis Stephens MD;  [facility];  Service: Urology    IL INSJ TUNNELED CTR VAD W/SUBQ PORT AGE 5 YR/> N/A 03/21/2023    Procedure: INSERTION VENOUS PORT ( PORT-A-CATH) IR;  Surgeon: Mark Amos DO;  [facility];  Service: Interventional Radiology    IL LAPS COLECTOMY PRTL W/COLOPXTSTMY LW ANAST N/A 8/17/2023    Procedure: RESECTION COLON LOW ANTERIOR LAPAROSCOPIC WITH ROBOTICS;  Surgeon: Sree Umanzor MD;  [facility];  Service: Colorectal    TONSILLECTOMY      URINARY SURGERY Bilateral     bilateral stents     Family History   Problem Relation Age of Onset    Cancer Mother     Cirrhosis Father     Breast cancer Neg Hx     Breast cancer additional onset Neg Hx      Social History     Socioeconomic History    Marital status:      Spouse name: Not on file    Number of children: Not on file    Years of education: Not on file    Highest education level: Not on file   Occupational History    Not on file   Tobacco Use    Smoking status: Never     Passive exposure: Past    Smokeless tobacco: Never   Vaping Use    Vaping status: Never Used   Substance and Sexual Activity    Alcohol use: Never    Drug use: Never    Sexual activity: Not Currently   Other Topics Concern    Not on file   Social History Narrative    From 4/14/23  note:    Emergency Contact: MATEO TREVIZO (ex-) [phone] (Mobile)    Marital Status:     Caregiver/Support: ex- -Mateo and two dtrs.     Children: two dtrs- nataliia 21 in [place] and doretha 18 [facility]    Child/Elder care: no     Housing: two story house    Home Setup: one level     Lives With:  frankie and two dtrs    Daily Living Activities: independent    Durable Medical Equipment: No    Ambulation: independent    Employment: disabled-SSI-$100 and pension-$700     Status/Location: no     Ability to pay bills: yes. No barriers to paying monthly bills.      POA/LW/AD: no.  Aissatou Galindo is healthcare representative.  MSW emailed advance directive.          Social Determinants of Health     Financial Resource Strain: Low Risk  (11/30/2022)    Received from [email or web address].    Financial Resource Strain     In the last 12 months, did you worry that your food could run out before you got money to buy more? : No   Food Insecurity: No Food Insecurity (8/18/2023)    Hunger Vital Sign     Worried About Running Out of Food in the Last Year: Never true     Ran Out of Food in the Last Year: Never true   Transportation Needs: No Transportation Needs (8/18/2023)    PRAPARE - Transportation     Lack of Transportation (Medical): No     Lack of Transportation (Non-Medical): No   Physical Activity: Not on file   Stress: Not on file   Social Connections: Not on file   Intimate Partner Violence: Low Risk  (11/30/2022)    Received from [email or web address].    Violence     Does anyone in your life hurt you, threaten you, frighten you or make you feel unsafe?: No   Housing Stability: Unknown (8/18/2023)    Housing Stability Vital Sign     Unable to Pay for Housing in the Last Year: No     Number of Places Lived in the Last Year: Not on file     Unstable Housing in the Last Year: No       Current Outpatient Medications:     acetaminophen (TYLENOL) 325 mg tablet, Take 2 tablets (650 mg total) by mouth every 4 (four) hours as needed for mild pain, Disp: , Rfl:     acetaminophen (TYLENOL) 500 mg tablet, Take 2 tablets (1,000 mg total) by mouth 3 (three) times a day as needed for mild pain, moderate pain, headaches or fever, Disp: 180 tablet, Rfl: 11    amLODIPine (NORVASC) 10  mg tablet, Take 1 tablet (10 mg total) by mouth daily, Disp: 90 tablet, Rfl: 3    cholecalciferol 400 units tablet, Take 400 Units by mouth daily, Disp: , Rfl:     cyanocobalamin (VITAMIN B-12) 1000 MCG tablet, Take 1,000 mcg by mouth daily, Disp: , Rfl:     docusate sodium (COLACE) 100 mg capsule, Take 1 capsule (100 mg total) by mouth 2 (two) times a day for 15 days, Disp: 30 capsule, Rfl: 0    estrogens, conjugated (Premarin) vaginal cream, Insert 0.3 g into the vagina 3 (three) times a week, Disp: 30 g, Rfl: 6    fenofibrate (TRICOR) 145 mg tablet, Take 1 tablet (145 mg total) by mouth daily, Disp: 90 tablet, Rfl: 3    ferrous sulfate 324 (65 Fe) mg, Take 1 tablet (324 mg total) by mouth daily before breakfast, Disp: 90 tablet, Rfl: 1    naproxen (NAPROSYN) 500 mg tablet, Take 1 tablet (500 mg total) by mouth 2 (two) times a day with meals for 5 days For pain, Disp: 10 tablet, Rfl: 0    oxybutynin (DITROPAN) 5 mg tablet, Take 1 tablet (5 mg total) by mouth every 6 (six) hours as needed (bladder spasms), Disp: 30 tablet, Rfl: 0    oxybutynin (DITROPAN) 5 mg tablet, Take 1 tablet (5 mg total) by mouth every 6 (six) hours as needed (bladder spasms), Disp: 30 tablet, Rfl: 0    phenazopyridine (PYRIDIUM) 200 mg tablet, Take 1 tablet (200 mg total) by mouth 3 (three) times a day as needed for bladder spasms, Disp: 30 tablet, Rfl: 0    potassium chloride (MICRO-K) 10 MEQ CR capsule, TAKE 4 CAPSULES (40 MEQ TOTAL) BY MOUTH 2 (TWO) TIMES A DAY, Disp: 240 capsule, Rfl: 1    predniSONE 5 mg tablet, Take 5 mg by mouth daily, Disp: , Rfl:     tamsulosin (FLOMAX) 0.4 mg, Take 1 capsule (0.4 mg total) by mouth daily with dinner, Disp: 15 capsule, Rfl: 0    ursodiol (ACTIGALL) 300 mg capsule, Take 3 capsules (900 mg total) by mouth in the morning, Disp: 90 capsule, Rfl: 11    Allergies   Allergen Reactions    Oxaliplatin Shortness Of Breath     Reactions occurred with 2nd and 3rd infusions (about 1-3 hours from initiation of  "infusion) and required treatment with steroids and antihistamines. Please refer to allergy note on 2/7/2023 for detailed description of her reactions.    Morphine Nausea Only     \"Every dose made me nauseous\" (oral dosing only, can tolerate IV morphine)    Potassium Chloride Other (See Comments)     Pt reports \"burning\" with Iv potassium administration and wishes for it to be added to chart     Vitals:    05/23/24 1348   BP: 122/80   Pulse: (!) 107   Resp: 18   Temp: 98 °F (36.7 °C)   SpO2: 97%     Physical Exam  Constitutional:       Appearance: She is well-developed.   HENT:      Head: Normocephalic and atraumatic.   Cardiovascular:      Rate and Rhythm: Normal rate and regular rhythm.      Pulses: Normal pulses.      Heart sounds: Normal heart sounds.   Pulmonary:      Effort: Pulmonary effort is normal.      Breath sounds: Normal breath sounds.      Comments: Clear bilaterally  Abdominal:      General: Abdomen is flat.      Tenderness: There is no abdominal tenderness.      Comments: + Bowel sounds, left upper quadrant ostomy in place, nontender, distended, no fluid, no rigidity or rebound   Skin:     General: Skin is warm.      Comments: Good color, warm, moist, no petechiae or ecchymosis   Neurological:      Mental Status: She is alert.      Deep Tendon Reflexes: Reflexes are normal and symmetric.     Extremities: No adenopathy in the neck, supraclavicular chain, axilla bilaterally  Lymphatics: no adenopathy in the neck, supraclavicular region, axilla and groin bilaterally    Performance Status: 1 - Symptomatic but completely ambulatory    Labs    5/9/2024 WBC = 6.07 hemoglobin = 10.1 hematocrit = 32.5 platelet = 224 neutrophil = 83% BUN = 31 creatinine = 1.27 calcium = 10.8 AST = 40 ALT = 58 alkaline phosphatase = 104 total protein = 6.6 total bilirubin = 0.63            3/7/2024 WBC = 7.03 hemoglobin = 11.8 hematocrit = 36.6 MCV = 82 RDW = 15.4 platelet = 302 neutrophil = 87% BUN = 41 creatinine = 1.70 " alkaline phosphatase = 210 AST = 28 ALT = 47 total bilirubin = 0.55    3/7/2024 iron saturation = 7% iron = 48 TIBC = 721 ferritin = 40        3/7/2024 ACE = 100 (14-82 U/L)    Imaging    3/11/2024 CAT scan chest abdomen pelvis with contrast    IMPRESSION:     Persistent mild left-sided hydroureteronephrosis with stably positioned double-J ureteral stent in place. Left-sided nephrolithiasis.     Improved right-sided hydronephrosis with stably positioned double-J ureteral stent in place. Right-sided nephrolithiasis.     Essentially stable pulmonary nodules, majority of which are cavitary. Again, the differential diagnosis includes but is not limited to metastatic disease and cavitary pulmonary sarcoidosis. No mediastinal lymphadenopathy.     Spleen top normal in size with heterogeneous appearance and some nodularity, similar to prior, likely related to the patient's known history of sarcoidosis.      8/23/2023 CAT scan abdomen pelvis with contrast    IMPRESSION:     Multiple loops of abnormally dilated proximal to mid small bowel with relative collapse of the distal small bowel most compatible with small bowel obstruction. A clear transition point is not identified. Surgical consultation is advised.     A few scattered tiny foci of free intraperitoneal air are noted likely postoperative in nature in this patient with a history of recent low anterior colon resection with right mid abdominal ileostomy.     Small amount of intra-abdominal and pelvic ascites.     Stable position of bilateral ureteral stents with no significant hydronephrosis.    3/18/2023 CAT scan chest abdomen pelvis with contrast    IMPRESSION:     1.  Probably right upper lobe interstitial opacities are nonspecific and could be due to infection or inflammation of the appropriate clinical context or fibrosis.  Malignancy is felt to be less likely but not excluded.  May consider short-term follow-up imaging in 2-3 months for reassessment.  2.   Consolidative changes in the left lower lobe and inferior lingula may be due to atelectasis.  3.  1.6 cm heterogeneous nodule the right lobe of the thyroid gland, recommend correlation with thyroid ultrasound if not obtained within the prior 6 months.  4.  There is an ill-defined area of hypoattenuation within the inferior right hepatic lobe of uncertain etiology.  Not appreciated on the previous exam.  Consider further assessment with contrast-enhanced MRI.  5.  Bilateral ureteral stents with calcifications suspected along the proximal right ureteral stent few scattered calcifications along the mid to distal left ureteral stent.  6.  Additional findings discussed above.       3/17/2023 MRI pelvics rectal cancer staging    IMPRESSION:  Unenhanced MRI of the Pelvis (Rectal Protocol)      SINCE MRI PELVIS RECTAL CANCER STAGING DATED 11/1/2022, POST TREATMENT PRIMARY TUMOR ASSESSMENT: Incomplete response (likely residual tumor.) Please note, though incomplete, there has been substantial tumor response to treatment with a significant   decrease in size of the high rectal cancer since MRI 11/1/2022.      SUSPICIOUS MESORECTAL LYMPH NODES: No.     SUSPICIOUS EXTRAMESORECTAL LYMPH NODES: No.       2/11/2023 CAT scan abdomen pelvis with contrast    IMPRESSION:     Interval decrease in right-sided hydronephrosis.     Thickening of the ascending colon and cecum which may represent colitis.  Follow-up with gastroenterology.     Stable 1 cm right adrenal gland nodule.Although its imaging features are indeterminate, it does not have suspicious imaging features (heterogeneity, necrosis, irregular margins), therefore this is likely benign, and can be followed by non-contrast abdomen CT or MRI in 12 months.  If patient has history of adrenal hyperfunction, consider biochemical evaluation.     Pathology    Case Report   Surgical Pathology Report                         Case: [id number]                                    Authorizing  "Provider:  Sree Umanzor MD      Collected:           08/17/2023 1020               Ordering Location:     [facility]      Received:            08/17/2023 1218                                      Hospital Operating Room                                                       Pathologist:           Lavinia Yusuf MD                                                          Specimens:   A) - Colon, Low Anterior Resection. Additional proximal colon \"Ostomy\"- Tattoo marks                 distal  (DMT)                                                                                        B) - Anastomatic site, Final Distal Rectal Margin (DMT)                                    Final Diagnosis   A. Rectosigmoid (low anterior resection):  - Adenocarcinoma arising in a tubular adenoma (2.5cm)  - Seventeen (17) lymph nodes negative for carcinoma (0/17)  - Margins negative for carcinoma   - See staging synoptic (ypT2N0)     Comment:  - MMR IHC was performed on the prior outside biopsy showing no loss of MMR IHC nuclear expression: Low risk of MSI-H.   - D2-40 and CD31 highlight vessels.      B. Colon, final distal margin (excision):  - No carcinoma identified     [place] [address]      Amendment electronically signed by Lavinia Yusuf MD on 8/24/2023 at  7:22 AM   Electronically signed by Lavinia Yusuf MD on 8/24/2023 at  6:59 AM   Additional Information    All reported additional testing was performed with appropriately reactive controls.  These tests were developed and their performance characteristics determined by [facility] or appropriate performing facility, though some tests may be performed on tissues which have not been validated for performance characteristics (such as staining performed on alcohol exposed cell blocks and decalcified tissues).  Results should be interpreted with caution and in the context of " the patients’ clinical condition. These tests may not be cleared or approved by the U.S. Food and Drug Administration, though the FDA has determined that such clearance or approval is not necessary. These tests are used for clinical purposes and they should not be regarded as investigational or for research. This laboratory has been approved by CLIA 88, designated as a high-complexity laboratory and is qualified to perform these tests.  .   Synoptic Checklist   COLON AND RECTUM: Resection, Including Transanal Disk Excision of Rectal Neoplasms  8th Edition - Protocol posted: 6/22/2022  COLON AND RECTUM: RESECTION - All Specimens  SPECIMEN   Procedure  Low anterior resection    Macroscopic Evaluation of Mesorectum  Near complete    TUMOR   Tumor Site  Rectosigmoid      Rectum    Rectal Tumor Location  Entirely above anterior peritoneal reflection    Histologic Type  Adenocarcinoma    Histologic Grade  G2, moderately differentiated    Tumor Size  Greatest dimension (Centimeters): 2.5 cm   Tumor Extent  Invades into muscularis propria    Macroscopic Tumor Perforation  Not identified    Lymphovascular Invasion  Not identified    Perineural Invasion  Not identified    Number of Tumor Buds  3 per 'hotspot' field   Tumor Bud Score  Low (0-4)    Type of Polyp in which Invasive Carcinoma Arose  Tubular adenoma    Treatment Effect  Present, with residual cancer showing evident tumor regression, but more than single cells or rare small groups of cancer cells (partial response, score 2)    MARGINS   Margin Status for Invasive Carcinoma  All margins negative for invasive carcinoma    Closest Margin(s) to Invasive Carcinoma  Radial (circumferential) or mesenteric    Distance from Invasive Carcinoma to Closest Margin  3.5 cm   Distance from Invasive Carcinoma to Radial (Circumferential) Margin  Distance already reported as closest margin    Distance from Invasive Carcinoma to Distal Margin  Greater than 1 cm    Margin Status for  Non-Invasive Tumor  All margins negative for high-grade dysplasia / intramucosal carcinoma and low-grade dysplasia    REGIONAL LYMPH NODES   Regional Lymph Node Status  All regional lymph nodes negative for tumor    Number of Lymph Nodes Examined  17    Tumor Deposits  Not identified    PATHOLOGIC STAGE CLASSIFICATION (pTNM, AJCC 8th Edition)   Reporting of pT, pN, and (when applicable) pM categories is based on information available to the pathologist at the time the report is issued. As per the AJCC (Chapter 1, 8th Ed.) it is the managing physician's responsibility to establish the final pathologic stage based upon all pertinent information, including but potentially not limited to this pathology report.   TNM Descriptors  y (post-treatment)    pT Category  pT2    pN Category  pN0    ADDITIONAL FINDINGS   Additional Findings  None identified               Case Report   Surgical Pathology Report                         Case: [id number]                                    Authorizing Provider:  Al Ferrera MD           Collected:           02/22/2023 0731               Ordering Location:     [facility] [place] [facility]      Received:            02/22/2023 32                                      Hospital Special[facility]                                                                                   Laboratory                                                                    Pathologist:           Moses Benedict MD                                                                  Specimen:    Colon, Rectosigmoid Colon Mass Biopsy (1 slide [id number] [facility], collected 10/28/2022)                                                              Final Diagnosis   A. Colon, Rectosigmoid Colon Mass Biopsy (1 slide [id number] [facility], collected 10/28/2022):  - Adenocarcinoma arising in a tubular adenoma.     Note: Per outside report (slides were not available for review).       RESULTS OF IMMUNOHISTOCHEMICAL ANALYSIS FOR MISMATCH REPAIR PROTEIN LOSS     RESULTS:  Antibody            Clone                 Description                     Results  MLH1                 M1                     Mismatch repair protein  Intact nuclear expression  MSH2                [id number]        Mismatch repair protein  Intact nuclear expression  MSH6                SP93                  Mismatch repair protein  Intact nuclear expression  PMS2                 A16-4                 Mismatch repair protein  Intact nuclear expression      Electronically signed by Moses Benedict MD on 2/22/2023 at 10:11 AM

## 2024-05-30 ENCOUNTER — HOSPITAL ENCOUNTER (OUTPATIENT)
Dept: INFUSION CENTER | Facility: CLINIC | Age: 59
Discharge: HOME | End: 2024-05-30
Payer: COMMERCIAL

## 2024-05-30 DIAGNOSIS — D50.9 IRON DEFICIENCY ANEMIA, UNSPECIFIED IRON DEFICIENCY ANEMIA TYPE: ICD-10-CM

## 2024-05-30 DIAGNOSIS — E83.42 HYPOMAGNESEMIA: ICD-10-CM

## 2024-05-30 DIAGNOSIS — Z95.828 PORT-A-CATH IN PLACE: Primary | ICD-10-CM

## 2024-05-30 DIAGNOSIS — D86.89 HEPATIC GRANULOMA ASSOCIATED WITH SARCOIDOSIS: ICD-10-CM

## 2024-05-30 DIAGNOSIS — C18.7 MALIGNANT NEOPLASM OF SIGMOID COLON (HCC): ICD-10-CM

## 2024-05-30 LAB
ALBUMIN SERPL BCP-MCNC: 4.1 G/DL (ref 3.5–5)
ALP SERPL-CCNC: 148 U/L (ref 34–104)
ALT SERPL W P-5'-P-CCNC: 77 U/L (ref 7–52)
ANION GAP SERPL CALCULATED.3IONS-SCNC: 8 MMOL/L (ref 4–13)
AST SERPL W P-5'-P-CCNC: 41 U/L (ref 13–39)
BASOPHILS # BLD AUTO: 0.02 THOUSANDS/ÂΜL (ref 0–0.1)
BASOPHILS NFR BLD AUTO: 0 % (ref 0–1)
BILIRUB SERPL-MCNC: 0.55 MG/DL (ref 0.2–1)
BUN SERPL-MCNC: 31 MG/DL (ref 5–25)
CALCIUM SERPL-MCNC: 10.3 MG/DL (ref 8.4–10.2)
CEA SERPL-MCNC: 4.4 NG/ML (ref 0–3)
CHLORIDE SERPL-SCNC: 108 MMOL/L (ref 96–108)
CO2 SERPL-SCNC: 23 MMOL/L (ref 21–32)
CREAT SERPL-MCNC: 1.07 MG/DL (ref 0.6–1.3)
CRP SERPL QL: 7.4 MG/L
EOSINOPHIL # BLD AUTO: 0.29 THOUSAND/ÂΜL (ref 0–0.61)
EOSINOPHIL NFR BLD AUTO: 6 % (ref 0–6)
ERYTHROCYTE [DISTWIDTH] IN BLOOD BY AUTOMATED COUNT: 14.2 % (ref 11.6–15.1)
ERYTHROCYTE [SEDIMENTATION RATE] IN BLOOD: 25 MM/HOUR (ref 0–29)
GFR SERPL CREATININE-BSD FRML MDRD: 58 ML/MIN/1.73SQ M
GLUCOSE SERPL-MCNC: 93 MG/DL (ref 65–140)
HCT VFR BLD AUTO: 34.7 % (ref 34.8–46.1)
HGB BLD-MCNC: 10.9 G/DL (ref 11.5–15.4)
IMM GRANULOCYTES # BLD AUTO: 0.02 THOUSAND/UL (ref 0–0.2)
IMM GRANULOCYTES NFR BLD AUTO: 0 % (ref 0–2)
INR PPP: 0.97 (ref 0.84–1.19)
LYMPHOCYTES # BLD AUTO: 0.32 THOUSANDS/ÂΜL (ref 0.6–4.47)
LYMPHOCYTES NFR BLD AUTO: 6 % (ref 14–44)
MAGNESIUM SERPL-MCNC: 1.8 MG/DL (ref 1.9–2.7)
MCH RBC QN AUTO: 27.6 PG (ref 26.8–34.3)
MCHC RBC AUTO-ENTMCNC: 31.4 G/DL (ref 31.4–37.4)
MCV RBC AUTO: 88 FL (ref 82–98)
MONOCYTES # BLD AUTO: 0.37 THOUSAND/ÂΜL (ref 0.17–1.22)
MONOCYTES NFR BLD AUTO: 7 % (ref 4–12)
NEUTROPHILS # BLD AUTO: 4.26 THOUSANDS/ÂΜL (ref 1.85–7.62)
NEUTS SEG NFR BLD AUTO: 81 % (ref 43–75)
NRBC BLD AUTO-RTO: 0 /100 WBCS
PLATELET # BLD AUTO: 212 THOUSANDS/UL (ref 149–390)
PMV BLD AUTO: 9.1 FL (ref 8.9–12.7)
POTASSIUM SERPL-SCNC: 4.1 MMOL/L (ref 3.5–5.3)
PROT SERPL-MCNC: 6.9 G/DL (ref 6.4–8.4)
PROTHROMBIN TIME: 13.5 SECONDS (ref 11.6–14.5)
RBC # BLD AUTO: 3.95 MILLION/UL (ref 3.81–5.12)
SODIUM SERPL-SCNC: 139 MMOL/L (ref 135–147)
WBC # BLD AUTO: 5.28 THOUSAND/UL (ref 4.31–10.16)

## 2024-05-30 PROCEDURE — 85610 PROTHROMBIN TIME: CPT

## 2024-05-30 PROCEDURE — 86140 C-REACTIVE PROTEIN: CPT

## 2024-05-30 PROCEDURE — 85652 RBC SED RATE AUTOMATED: CPT

## 2024-05-30 PROCEDURE — 82378 CARCINOEMBRYONIC ANTIGEN: CPT

## 2024-05-30 PROCEDURE — 85025 COMPLETE CBC W/AUTO DIFF WBC: CPT

## 2024-05-30 PROCEDURE — 82164 ANGIOTENSIN I ENZYME TEST: CPT

## 2024-05-30 PROCEDURE — 80053 COMPREHEN METABOLIC PANEL: CPT

## 2024-05-30 PROCEDURE — 83735 ASSAY OF MAGNESIUM: CPT

## 2024-05-30 NOTE — PROGRESS NOTES
Patient arrives to infusion center for lab draw today. R PAC accessed without issue, brisk blood return noted. Labs collected and sent as per orders. Port flushed well without resistance. Deaccessed, bandaid in place. Patient confirmed next appt 6/17 at 1030, declines AVS.

## 2024-05-31 LAB — ACE SERPL-CCNC: 101 U/L (ref 14–82)

## 2024-05-31 RX ORDER — FERROUS SULFATE 324(65)MG
324 TABLET, DELAYED RELEASE (ENTERIC COATED) ORAL
Qty: 90 TABLET | Refills: 0 | Status: SHIPPED | OUTPATIENT
Start: 2024-05-31

## 2024-06-03 ENCOUNTER — PROCEDURE VISIT (OUTPATIENT)
Dept: DERMATOLOGY | Facility: CLINIC | Age: 59
End: 2024-06-03
Payer: COMMERCIAL

## 2024-06-03 VITALS
HEART RATE: 94 BPM | OXYGEN SATURATION: 99 % | TEMPERATURE: 99 F | BODY MASS INDEX: 24.11 KG/M2 | WEIGHT: 131 LBS | HEIGHT: 62 IN

## 2024-06-03 DIAGNOSIS — L72.0 EPIDERMAL CYST: Primary | ICD-10-CM

## 2024-06-03 PROCEDURE — 88305 TISSUE EXAM BY PATHOLOGIST: CPT | Performed by: STUDENT IN AN ORGANIZED HEALTH CARE EDUCATION/TRAINING PROGRAM

## 2024-06-03 PROCEDURE — 11420 EXC H-F-NK-SP B9+MARG 0.5/<: CPT | Performed by: STUDENT IN AN ORGANIZED HEALTH CARE EDUCATION/TRAINING PROGRAM

## 2024-06-03 NOTE — PROGRESS NOTES
"[facility] Note     Patient Name: Danielle Haque  Encounter Date: 6/3/2024     Have you been cared for by a [facility] Dermatologist in the last 3 years and, if so, which description applies to you?    Yes.  I have been here within the last 3 years, and my medical history has NOT changed since that time.  I am FEMALE/of child-bearing potential.    REVIEW OF SYSTEMS:  Have you recently had or currently have any of the following? No changes in my recent health.   PAST MEDICAL HISTORY:  Have you personally ever had or currently have any of the following?  If \"YES,\" then please provide more detail. No changes in my medical history.   HISTORY OF IMMUNOSUPPRESSION: Do you have a history of any of the following:  Systemic Immunosuppression such as Diabetes, Biologic or Immunotherapy, Chemotherapy, Organ Transplantation, Bone Marrow Transplantation?  No     Answering \"YES\" requires the addition of the dotphrase \"IMMUNOSUPPRESSED\" as the first diagnosis of the patient's visit.   FAMILY HISTORY:  Any \"first degree relatives\" (parent, brother, sister, or child) with the following?    No changes in my family's known health.   PATIENT EXPERIENCE:    Do you want the Dermatologist to perform a COMPLETE skin exam today including a clinical examination under the \"bra and underwear\" areas?  NO  If necessary, do we have your permission to call and leave a detailed message on your Preferred Phone number that includes your specific medical information?  Yes      Allergies   Allergen Reactions    Oxaliplatin Shortness Of Breath     Reactions occurred with 2nd and 3rd infusions (about 1-3 hours from initiation of infusion) and required treatment with steroids and antihistamines. Please refer to allergy note on 2/7/2023 for detailed description of her reactions.    Morphine Nausea Only     \"Every dose made me nauseous\" (oral dosing only, can tolerate IV morphine)    Potassium Chloride Other (See Comments)     Pt reports " "\"burning\" with Iv potassium administration and wishes for it to be added to chart      Current Outpatient Medications:     acetaminophen (TYLENOL) 325 mg tablet, Take 2 tablets (650 mg total) by mouth every 4 (four) hours as needed for mild pain, Disp: , Rfl:     acetaminophen (TYLENOL) 500 mg tablet, Take 2 tablets (1,000 mg total) by mouth 3 (three) times a day as needed for mild pain, moderate pain, headaches or fever, Disp: 180 tablet, Rfl: 11    amLODIPine (NORVASC) 10 mg tablet, Take 1 tablet (10 mg total) by mouth daily, Disp: 90 tablet, Rfl: 3    cholecalciferol 400 units tablet, Take 400 Units by mouth daily, Disp: , Rfl:     cyanocobalamin (VITAMIN B-12) 1000 MCG tablet, Take 1,000 mcg by mouth daily, Disp: , Rfl:     docusate sodium (COLACE) 100 mg capsule, Take 1 capsule (100 mg total) by mouth 2 (two) times a day for 15 days, Disp: 30 capsule, Rfl: 0    estrogens, conjugated (Premarin) vaginal cream, Insert 0.3 g into the vagina 3 (three) times a week, Disp: 30 g, Rfl: 6    fenofibrate (TRICOR) 145 mg tablet, Take 1 tablet (145 mg total) by mouth daily, Disp: 90 tablet, Rfl: 3    ferrous sulfate 324 (65 Fe) mg, Take 1 tablet (324 mg total) by mouth daily before breakfast, Disp: 90 tablet, Rfl: 0    naproxen (NAPROSYN) 500 mg tablet, Take 1 tablet (500 mg total) by mouth 2 (two) times a day with meals for 5 days For pain, Disp: 10 tablet, Rfl: 0    oxybutynin (DITROPAN) 5 mg tablet, Take 1 tablet (5 mg total) by mouth every 6 (six) hours as needed (bladder spasms), Disp: 30 tablet, Rfl: 0    oxybutynin (DITROPAN) 5 mg tablet, Take 1 tablet (5 mg total) by mouth every 6 (six) hours as needed (bladder spasms), Disp: 30 tablet, Rfl: 0    phenazopyridine (PYRIDIUM) 200 mg tablet, Take 1 tablet (200 mg total) by mouth 3 (three) times a day as needed for bladder spasms, Disp: 30 tablet, Rfl: 0    potassium chloride (MICRO-K) 10 MEQ CR capsule, TAKE 4 CAPSULES (40 MEQ TOTAL) BY MOUTH 2 (TWO) TIMES A DAY, Disp: " 240 capsule, Rfl: 1    predniSONE 5 mg tablet, Take 5 mg by mouth daily, Disp: , Rfl:     tamsulosin (FLOMAX) 0.4 mg, Take 1 capsule (0.4 mg total) by mouth daily with dinner, Disp: 15 capsule, Rfl: 0    ursodiol (ACTIGALL) 300 mg capsule, Take 3 capsules (900 mg total) by mouth in the morning, Disp: 90 capsule, Rfl: 11          Whom besides the patient is providing clinical information about today's encounter?   NO ADDITIONAL HISTORIAN (patient alone provided history)    Physical Exam and Assessment/Plan by Diagnosis:        PROCEDURE NOTE:  PUNCH EXCISION REMOVAL       Performing Physician:     Anatomic Location; Clinical Description with size (cm); Pre-Op Diagnosis:    Specimen A: Left Postauricular; Skin Specimen (submit in FORMALIN):Punch EXCISION (when a punch biopsy tool is used; simple closure is included) (CPT 88877; each additional punch biopsy is CPT 91973); 58 year old female with 0.5 cm epidermoid inclusion cyst.         Anesthesia: 1% xylocaine with epi       Topical anesthesia: None       Indications: To indicate diagnosis and management plan.    Procedure Details     Patient informed of the risks (including bleeding,scaring and infection) and benefits of the procedure explained. Verbal and written informed consent obtained. The area was prepped and draped in the usual fashion. Anesthesia was obtained with 1% lidocaine with epinephrine. The skin was then stretched perpendicular to the skin tension lines and a punch biopsy to an appropriate sampling depth was obtained with a 6 mm punch with a forceps and iris scissors.         The wound was closed with subcutaneous sutures as follows:    Deep suture:4-0 Vicryl       Epidermal edge closure was accomplished with superficial sutures as follows:    Superficial suture: 3-0 Prolene x 3 sutures  Superficial suture type: Interrupted        Complications:  None      Specimen has been sent for review by Dermatopathology.      Plan:  1. Instructed to  keep the wound dry and covered for 24-48h and clean thereafter.  2. Warning signs of infection were reviewed.    3. Recommended that the patient use acetaminophen as needed for pain  4. Sutures if any should be removed in 10 days. [facility] scheduled [facility] on 6[facility]14/24 at 10:45 am      Standard post-procedure care has been explained and has been included in written form within the patient's copy of Informed Consent.       Scribe Attestation      I,:  Samira Bowden am acting as a scribe while in the presence of the attending physician.:       I,:  Luis Aguilar MD personally performed the services described in this documentation    as scribed in my presence.:

## 2024-06-03 NOTE — PATIENT INSTRUCTIONS
POSTOP DISCUSSION DISCUSSION AND INSTRUCTIONS FOR PATIENT      Complications:  None      Specimen has been sent for review by Dermatopathology.      Plan:  1. Instructed to keep the wound dry and covered for 24-48h and clean thereafter.  2. Warning signs of infection were reviewed.    3. Recommended that the patient use acetaminophen as needed for pain  4. Sutures if any should be removed in 10 days      Standard post-procedure care has been explained and has been included in written form within the patient's copy of Informed Consent.      If bleeding is noticed, place a clean cloth over the area and apply firm pressure for thirty minutes.  Check the wound ONLY after 30 minutes of direct pressure; do not cheat and sneak a peak, as that does not count.  If bleeding persists after 30 minutes of legitimate direct pressure, then try one more round of direct pressure for an additional 10 minutes to the area.  Should the bleeding become heavier or not stop after the second attempt, call [facility] directly at [phone] (SKIN) or, if after hours, go to your local Emergency Room/Emergency Department.

## 2024-06-06 ENCOUNTER — TELEPHONE (OUTPATIENT)
Age: 59
End: 2024-06-06

## 2024-06-06 ENCOUNTER — NURSE TRIAGE (OUTPATIENT)
Age: 59
End: 2024-06-06

## 2024-06-06 NOTE — TELEPHONE ENCOUNTER
Called pt back to relay message from Wilberto in message encounter. Pt understood and will call with any concenrs in the meantime.

## 2024-06-06 NOTE — TELEPHONE ENCOUNTER
"Pt calling in requesting to speak to Samira. Pt reports she had cyst removed (left post auricular punch excision removal) on 6/3 and is concerned as she took bandage off yesterday to clean and apply vaseline and area feels exactly the same with lump. Reviewed some post op swelling can be normal but pt feels it is harder than swelling and that cyst is still present, inquiring if she needs to come back in prior to 6/14 suture removal appt for assessment/additional excision. Denies pain or any other sx. See full triage below.     Requested photos of area, pt to send in [employer] for review.     Answer Assessment - Initial Assessment Questions  1. SYMPTOM: \"What's the main symptom you're concerned about?\" (e.g., pain, fever, vomiting)      Still feels post op bump and still itchy, feels exactly the same   2. ONSET: \"When did sx  start?\"      Noticed yesterday when she took bandage off  3. SURGERY: \"What surgery was performed?\"      Cyst removal behind ear  4. DATE of SURGERY: \"When was surgery performed?\"       6/3 cyst removal  5. ANESTHESIA: \" What type of anesthesia did you have?\" (e.g., general, spinal, epidural, local)      local  6. PAIN: \"Is there any pain?\" If Yes, ask: \"How bad is it?\"  (Scale 1-10; or mild, moderate, severe)      Not really  7. FEVER: \"Do you have a fever?\" If Yes, ask: \"What is your temperature, how was it measured, and when did it start?\"      none  8. VOMITING: \"Is there any vomiting?\" If yes, ask: \"How many times?\"      none  9. BLEEDING: \"Is there any bleeding?\" If Yes, ask: \"How much?\" and \"Where?\"      none  10. OTHER SYMPTOMS: \"Do you have any other symptoms?\" (e.g., drainage from wound, painful urination, constipation)        none    Protocols used: Post-Op Symptoms and Questions-ADULT-[place]    "

## 2024-06-07 PROCEDURE — 88305 TISSUE EXAM BY PATHOLOGIST: CPT | Performed by: STUDENT IN AN ORGANIZED HEALTH CARE EDUCATION/TRAINING PROGRAM

## 2024-06-07 NOTE — RESULT ENCOUNTER NOTE
DERMATOPATHOLOGY RESULT NOTE    Results reviewed by ordering physician.  Called patient to personally discuss results. Epoq message sent      Instructions for Clinical Derm Team:   (remember to route Result Note to appropriate staff):    None    Result & Plan by Specimen:    Specimen A: benign  Plan: excised         Component   Case Report  Surgical Pathology Report                         Case: [id number]                                  Authorizing Provider:  Luis Aguilar MD Collected:           06/03/2024 1254              Ordering Location:     [facility]      Received:            06/03/2024 [address] [place] [address]                                                                Pathologist:           Carlos Bradford MD                                                          Specimen:    Skin, Other, Specimen A: Left Postauricular                                              Final Diagnosis  A. Skin, left postauricular:    EPIDERMAL INCLUSION CYST accompanied by a keratin granuloma (I.e., foreign body giant cell reaction to keratinous debris)

## 2024-06-09 DIAGNOSIS — D86.89 HEPATIC GRANULOMA ASSOCIATED WITH SARCOIDOSIS: ICD-10-CM

## 2024-06-10 RX ORDER — URSODIOL 300 MG/1
900 CAPSULE ORAL DAILY
Qty: 90 CAPSULE | Refills: 1 | Status: SHIPPED | OUTPATIENT
Start: 2024-06-10 | End: 2025-06-10

## 2024-06-14 ENCOUNTER — CLINICAL SUPPORT (OUTPATIENT)
Dept: DERMATOLOGY | Facility: CLINIC | Age: 59
End: 2024-06-14

## 2024-06-14 DIAGNOSIS — Z48.02 ENCOUNTER FOR REMOVAL OF SUTURES: Primary | ICD-10-CM

## 2024-06-14 PROCEDURE — RECHECK: Performed by: DERMATOLOGY

## 2024-06-14 NOTE — PROGRESS NOTES
"Suture removal    Date/Time: 6/14/2024 10:45 AM    Performed by: Mariela Stern RN  Authorized by: Bo Hale MD  Universal Protocol:  Consent: Verbal consent obtained. Written consent not obtained.  Risks and benefits: risks, benefits and alternatives were discussed  Consent given by: patient  Time out: Immediately prior to procedure a \"time out\" was called to verify the correct patient, procedure, equipment, support staff and site/side marked as required.  Timeout called at: 6/14/2024 10:42 AM.  Patient understanding: patient states understanding of the procedure being performed  Patient consent: the patient's understanding of the procedure matches consent given  Procedure consent: procedure consent matches procedure scheduled  Relevant documents: relevant documents not present or verified  Test results: test results not available  Site marked: the operative site was not marked  Radiology Images displayed and confirmed. If images not available, report reviewed: imaging studies not available  Patient identity confirmed: verbally with patient      Patient location:  Clinic  Location:     Laterality:  Left    Location:  Head/neck    Head/neck location:  Ear    Ear location:  L ear (Postauricular)  Procedure details:     Tools used:  Suture removal kit    Wound appearance:  No sign(s) of infection, good wound healing, clean, moist and pink    Number of sutures removed:  3  Post-procedure details:     Post-procedure assessment: Vaseline applied.    Patient tolerance of procedure:  Tolerated well, no immediate complications  Comments:      Patient advised to use Vaseline and a band aid for 1-2 more weeks     Before suture removal     After suture removal     "

## 2024-06-17 ENCOUNTER — HOSPITAL ENCOUNTER (OUTPATIENT)
Dept: INFUSION CENTER | Facility: CLINIC | Age: 59
Discharge: HOME | End: 2024-06-17
Payer: COMMERCIAL

## 2024-06-17 ENCOUNTER — PATIENT MESSAGE (OUTPATIENT)
Dept: GASTROENTEROLOGY | Facility: AMBULARY SURGERY CENTER | Age: 59
End: 2024-06-17

## 2024-06-17 DIAGNOSIS — Z95.828 PORT-A-CATH IN PLACE: Primary | ICD-10-CM

## 2024-06-17 DIAGNOSIS — R79.89 ABNORMAL LIVER FUNCTION TESTS: ICD-10-CM

## 2024-06-17 DIAGNOSIS — C18.7 MALIGNANT NEOPLASM OF SIGMOID COLON (HCC): ICD-10-CM

## 2024-06-17 DIAGNOSIS — D86.9 SARCOIDOSIS: Primary | ICD-10-CM

## 2024-06-17 LAB
ALBUMIN SERPL BCP-MCNC: 4.2 G/DL (ref 3.5–5)
ALP SERPL-CCNC: 132 U/L (ref 34–104)
ALT SERPL W P-5'-P-CCNC: 73 U/L (ref 7–52)
ANION GAP SERPL CALCULATED.3IONS-SCNC: 8 MMOL/L (ref 4–13)
AST SERPL W P-5'-P-CCNC: 54 U/L (ref 13–39)
BASOPHILS # BLD AUTO: 0.03 THOUSANDS/ÂΜL (ref 0–0.1)
BASOPHILS NFR BLD AUTO: 1 % (ref 0–1)
BILIRUB SERPL-MCNC: 0.7 MG/DL (ref 0.2–1)
BUN SERPL-MCNC: 38 MG/DL (ref 5–25)
CALCIUM SERPL-MCNC: 11.4 MG/DL (ref 8.4–10.2)
CHLORIDE SERPL-SCNC: 107 MMOL/L (ref 96–108)
CO2 SERPL-SCNC: 21 MMOL/L (ref 21–32)
CREAT SERPL-MCNC: 1.31 MG/DL (ref 0.6–1.3)
EOSINOPHIL # BLD AUTO: 0.31 THOUSAND/ÂΜL (ref 0–0.61)
EOSINOPHIL NFR BLD AUTO: 5 % (ref 0–6)
ERYTHROCYTE [DISTWIDTH] IN BLOOD BY AUTOMATED COUNT: 13.7 % (ref 11.6–15.1)
GFR SERPL CREATININE-BSD FRML MDRD: 45 ML/MIN/1.73SQ M
GLUCOSE SERPL-MCNC: 92 MG/DL (ref 65–140)
HCT VFR BLD AUTO: 34.3 % (ref 34.8–46.1)
HGB BLD-MCNC: 11.2 G/DL (ref 11.5–15.4)
IMM GRANULOCYTES # BLD AUTO: 0.03 THOUSAND/UL (ref 0–0.2)
IMM GRANULOCYTES NFR BLD AUTO: 1 % (ref 0–2)
LYMPHOCYTES # BLD AUTO: 0.37 THOUSANDS/ÂΜL (ref 0.6–4.47)
LYMPHOCYTES NFR BLD AUTO: 6 % (ref 14–44)
MCH RBC QN AUTO: 27.9 PG (ref 26.8–34.3)
MCHC RBC AUTO-ENTMCNC: 32.7 G/DL (ref 31.4–37.4)
MCV RBC AUTO: 86 FL (ref 82–98)
MONOCYTES # BLD AUTO: 0.45 THOUSAND/ÂΜL (ref 0.17–1.22)
MONOCYTES NFR BLD AUTO: 8 % (ref 4–12)
NEUTROPHILS # BLD AUTO: 4.75 THOUSANDS/ÂΜL (ref 1.85–7.62)
NEUTS SEG NFR BLD AUTO: 79 % (ref 43–75)
NRBC BLD AUTO-RTO: 0 /100 WBCS
PLATELET # BLD AUTO: 258 THOUSANDS/UL (ref 149–390)
PMV BLD AUTO: 9.3 FL (ref 8.9–12.7)
POTASSIUM SERPL-SCNC: 4.2 MMOL/L (ref 3.5–5.3)
PROT SERPL-MCNC: 7 G/DL (ref 6.4–8.4)
RBC # BLD AUTO: 4.01 MILLION/UL (ref 3.81–5.12)
SODIUM SERPL-SCNC: 136 MMOL/L (ref 135–147)
WBC # BLD AUTO: 5.94 THOUSAND/UL (ref 4.31–10.16)

## 2024-06-17 PROCEDURE — 80053 COMPREHEN METABOLIC PANEL: CPT

## 2024-06-17 PROCEDURE — 85025 COMPLETE CBC W/AUTO DIFF WBC: CPT

## 2024-06-17 NOTE — PROGRESS NOTES
Patient to Infusion Center for Lab Testing / Port Maintenance: Offers no complaints at present time: Right PAC accessed without difficulty: Good blood return noted: Labs drawn per MD order

## 2024-06-17 NOTE — PROGRESS NOTES
Tolerated procedure without incident: No adverse reactions noted: Verified follow up appt with patient ( 07/17/24 ): AVS offered and declined

## 2024-06-24 ENCOUNTER — APPOINTMENT (OUTPATIENT)
Dept: RADIOLOGY | Facility: HOSPITAL | Age: 59
End: 2024-06-24
Payer: COMMERCIAL

## 2024-06-24 ENCOUNTER — HOSPITAL ENCOUNTER (OUTPATIENT)
Dept: CT IMAGING | Facility: HOSPITAL | Age: 59
Discharge: HOME | End: 2024-06-24
Attending: STUDENT IN AN ORGANIZED HEALTH CARE EDUCATION/TRAINING PROGRAM
Payer: COMMERCIAL

## 2024-06-24 DIAGNOSIS — R91.8 LUNG NODULE, MULTIPLE: ICD-10-CM

## 2024-06-24 DIAGNOSIS — C20 RECTAL CANCER (HCC): ICD-10-CM

## 2024-06-24 PROCEDURE — 71260 CT THORAX DX C+: CPT

## 2024-06-24 PROCEDURE — 74177 CT ABD & PELVIS W/CONTRAST: CPT

## 2024-06-24 RX ADMIN — IOHEXOL 85 ML: 350 INJECTION, SOLUTION INTRAVENOUS at 10:24

## 2024-06-26 DIAGNOSIS — N13.5 URETERAL STRICTURE: ICD-10-CM

## 2024-06-26 DIAGNOSIS — I10 PRIMARY HYPERTENSION: ICD-10-CM

## 2024-06-26 RX ORDER — AMLODIPINE BESYLATE 10 MG/1
10 TABLET ORAL DAILY
Qty: 90 TABLET | Refills: 1 | Status: SHIPPED | OUTPATIENT
Start: 2024-06-26

## 2024-06-26 RX ORDER — OXYBUTYNIN CHLORIDE 5 MG/1
5 TABLET ORAL EVERY 6 HOURS PRN
Qty: 30 TABLET | Refills: 5 | Status: SHIPPED | OUTPATIENT
Start: 2024-06-26

## 2024-07-01 ENCOUNTER — OFFICE VISIT (OUTPATIENT)
Dept: RADIATION ONCOLOGY | Facility: HOSPITAL | Age: 59
End: 2024-07-01
Attending: STUDENT IN AN ORGANIZED HEALTH CARE EDUCATION/TRAINING PROGRAM
Payer: COMMERCIAL

## 2024-07-01 VITALS
TEMPERATURE: 97.2 F | OXYGEN SATURATION: 98 % | SYSTOLIC BLOOD PRESSURE: 138 MMHG | WEIGHT: 131 LBS | HEART RATE: 98 BPM | DIASTOLIC BLOOD PRESSURE: 78 MMHG | BODY MASS INDEX: 24.11 KG/M2 | HEIGHT: 62 IN | RESPIRATION RATE: 18 BRPM

## 2024-07-01 DIAGNOSIS — R91.8 LUNG NODULE, MULTIPLE: ICD-10-CM

## 2024-07-01 DIAGNOSIS — C20 RECTAL CANCER (HCC): Primary | ICD-10-CM

## 2024-07-01 PROCEDURE — 99211 OFF/OP EST MAY X REQ PHY/QHP: CPT | Performed by: STUDENT IN AN ORGANIZED HEALTH CARE EDUCATION/TRAINING PROGRAM

## 2024-07-01 PROCEDURE — 99215 OFFICE O/P EST HI 40 MIN: CPT | Performed by: STUDENT IN AN ORGANIZED HEALTH CARE EDUCATION/TRAINING PROGRAM

## 2024-07-01 NOTE — PROGRESS NOTES
Danielle Haque 1965 is a 58 y.o. female   with a history of sarcoidosis who presented with Stage IIA (cT3N0) mid to high rectal adenocarcinoma. She was started on Cape[place] in [place] and received 3 cycles with significant difficulty. She thereafter transferred her care to [facility] with decision to transition to chemoRT. On 5/25/23 she completed a course of neoadjuvant chemoRT to a dose of 5040 cGy in 28 fractions with concurrent 5-FU. She was last seen 3/18/24. She returns today for follow-up.    3/19/24 Hematology Oncology - Dr. Ferrera  At this time, there is no clear evidence for rectal cancer recurrence or progression.  There are pulmonary nodules but other than biopsying each nodule, it is impossible to determine if this is sarcoid or recurrence.    ACE level is elevated, CEA level is minimally elevated.    Plan is to continue with surveillance.     3/25/24 Urology - JONAS Garcia  Bilateral stents in place chronically.   She did have a trial of removal of her left stent which has now been replaced. She has some hematuria.   Sample of urine was sent for culture.     5/10/24 Cystoscopy, retrograde pyelography, bilateral stent exchange    5/23/24 Hematology Oncology - Dr. Ferrera  As discussed previously, there are pulmonary nodules and an adenopathy but is impossible determine if this is sarcoid or rectal cancer surveillance.   ACE level is slightly higher than before but so is the CEA level (5.3)   F/u in 3 months with CEA prior   CARCINOEMBRYONIC ANTIGEN   Latest Ref Rng 0.0 - 3.0 ng/mL   11/7/2023 3.7 (H)    3/7/2024 4.9 (H)    4/24/2024 5.3 (H)    5/30/2024 4.4 (H)         6/24/24 CT CAP  IMPRESSION:  Stable bilateral pulmonary nodules, some which are cavitary, are unchanged.  Multiple low-density lesions throughout the spleen likely due to sarcoid, also unchanged.  Bilateral hydronephrosis, decreased on the left and slightly increased on the right.  Bilateral nonobstructing calculi unchanged.  No new findings in  the chest abdomen or pelvis.       Upcomin24 Urology, Dr. Stephens  23 Colon & Rectal Surgery, Dr. Umanzor  24 Nephrology  9/3/24 Hematology Oncology, Dr. Ferrera    Follow up visit     Oncology History   Malignant neoplasm of sigmoid colon (HCC)   10/28/2022 Biopsy    Colon, Rectosigmoid Colon Mass Biopsy (1 slide [id number] [facility], collected 10/28/2022):  - Adenocarcinoma arising in a tubular adenoma     2022 Biopsy    DIAGNOSIS LIVER, SEGMENT 3, RESECTION:  - HYALINIZED SUBCAPSULAR AND INTRA-PARENCHYMAL NODULES. NO MALIGNANCY SEEN. SEE NOTE  - MILD MACROVESICULAR STEATOSIS (25%). NO EVIDENCE OF STEATOHEPATITIS. TRICHOME STAIN SHOWS MILD PORTAL FIBROSIS.   - 2+ IRON DEPOSITION WITHIN KUPFFER CELLS (IRON STAIN), CONSISTENT WITH SECONDARY HEMOCHROMATOSIS.     NOTE: THIS LIKELY REPRESENTS A MARKEDLY SCLEROTIC HEMANGIOMA OR CHANGES SECONDARY TO INJURY.               2022 Surgery    Laparoscopic diverting colostomy with liver biopsy. Dr. Serrano.      2022 Initial Diagnosis    Malignant neoplasm of sigmoid colon (HCC)      - 1/10/2023 Chemotherapy    Oxaliplatin. Standard, [place]     2023 -  Cancer Staged    Staging form: Colon and Rectum, AJCC 8th Edition  - Clinical: Stage IIA (cT3, cN0, cM0) - Signed by Edouard Ferrera MD on 2023  Total positive nodes: 0       2023 - 2023 Chemotherapy    potassium chloride, 20 mEq, Intravenous, Once, 3 of 3 cycles  Administration: 20 mEq (2023), 20 mEq (2023), 20 mEq (2023)  alteplase (CATHFLO), 2 mg, Intracatheter, Every 1 Minute as needed, 6 of 6 cycles  fluorouracil (ADRUCIL) ambulatory infusion Soln, 225 mg/m2/day = 1,880 mg, Intravenous, Over 120 hours, 6 of 6 cycles     2023 - 2023 Radiation    Treatments:  Course: C1    Plan ID Energy Fractions Dose per Fraction (cGy) Dose Correction (cGy) Total Dose Delivered (cGy) Elapsed Days   CD Rectum 6X 3 / 3 180 0  540 2   Whole Pelvis 6X 25 / 25 180 0 4,500 35      Treatment Dates:  4/17/2023 - 5/25/2023.      Rectal cancer (HCC)   7/14/2023 Initial Diagnosis    Rectal cancer (HCC)     9/6/2023 -  Cancer Staged    Staging form: Colon and Rectum, AJCC 8th Edition  - Clinical: Stage IIA (cT3, cN0, cM0) - Signed by Edouard Ferrera MD on 9/6/2023  Total positive nodes: 0           Review of Systems:  Review of Systems   Constitutional: Negative.    HENT: Negative.     Eyes: Negative.    Respiratory: Negative.     Cardiovascular: Negative.    Gastrointestinal: Negative.    Endocrine: Negative.    Genitourinary:  Positive for dysuria and pelvic pain.   Musculoskeletal:  Positive for back pain.   Skin: Negative.    Allergic/Immunologic: Negative.    Neurological:  Positive for dizziness (early in the am).   Hematological: Negative.    Psychiatric/Behavioral: Negative.         Clinical Trial: no    Pregnancy test needed:  no    Teaching na    Health Maintenance   Topic Date Due    COVID-19 Vaccine (1) Never done    HIV Screening  Never done    Annual Physical  Never done    Zoster Vaccine (1 of 2) Never done    Cervical Cancer Screening  Never done    Breast Cancer Screening: Mammogram  07/26/2024    Influenza Vaccine (1) 09/01/2024    Pneumococcal Vaccine: Pediatrics (0 to 5 Years) and At-Risk Patients (6 to 64 Years) (1 of 2 - PCV) 12/04/2024 (Originally 11/17/1971)    DTaP,Tdap,and Td Vaccines (1 - Tdap) 12/04/2024 (Originally 11/17/1988)    Colorectal Cancer Screening  11/01/2024    Depression Screening  03/18/2025    BMI: Adult  06/03/2025    RSV Vaccine Age 60+ Years (1 - 1-dose 60+ series) 11/17/2027    Hepatitis C Screening  Completed    RSV Vaccine age 0-20 Months  Aged Out    HIB Vaccine  Aged Out    IPV Vaccine  Aged Out    Hepatitis A Vaccine  Aged Out    Meningococcal ACWY Vaccine  Aged Out    HPV Vaccine  Aged Out     Patient Active Problem List   Diagnosis    Malignant neoplasm of sigmoid colon (HCC)    History  of Clostridium difficile colitis    Other hemochromatosis    Hypertension    Sarcoidosis    Impaired fasting glucose    Hypokalemia    Transaminitis    Hydronephrosis due to ureteral stricture    Adrenal nodule (HCC)    Other specified anemias    Nonrheumatic mitral valve regurgitation    Advance directive discussed with patient    Skin lesion    Kidney calculi    Dysuria    Chest pain    Port-A-Cath in place    Drug-induced constipation    Palliative care patient    Cancer related pain    Rectal cancer (HCC)    Annual physical exam    Preop examination    Shingles    Partial small bowel obstruction (HCC)    Scalp pain    Left ear pain    Chronic anticoagulation    Neuralgia involving scalp    Anxiety about health    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    Ureteral stricture    Ureteral calculi with hydroureteronephrosis    Pulmonary nodules    Lump of skin    Iron deficiency anemia    THOR (acute kidney injury) (HCC)    Hypomagnesemia     Past Medical History:   Diagnosis Date    Bleeding from colostomy stoma (HCC)      states ileostomy , not colostomy    Cancer (HCC)     Colon cancer (HCC)     Fall     Headache     Hemochromatosis, unspecified     History of transfusion     2022 - no adverse reaction    Hypertension     Kidney calculi     Kidney stone     Liver disease     hemangioma    Muscle weakness     Personal history of COVID-19 12/2022    Sarcoidosis     Seizures (HCC)     2022    Shingles      Past Surgical History:   Procedure Laterality Date    ABSCESS DRAINAGE      left breast    BREAST BIOPSY      CHEST TUBE INSERTION      COLON SURGERY      colostomy    COLONOSCOPY      FL GUIDED CENTRAL VENOUS ACCESS DEVICE INSERTION  03/21/2023    FL RETROGRADE PYELOGRAM  01/23/2023    FL RETROGRADE PYELOGRAM  04/03/2023    FL RETROGRADE PYELOGRAM  7/21/2023    FL RETROGRADE PYELOGRAM  10/11/2023    FL RETROGRADE PYELOGRAM  12/15/2023    FL RETROGRADE PYELOGRAM  2/8/2024    FL RETROGRADE PYELOGRAM   5/10/2024    IR BIOPSY LIVER MASS  05/09/2023    LUNG BIOPSY      VT CYSTO BLADDER W/URETERAL CATHETERIZATION Bilateral 07/21/2023    Procedure: CYSTOSCOPY URETEROSCOPY RETROGRADE PYELOGRAM WITH BILATERAL STENT URETERAL EXCHANGE; LEFT LASER LITHOTRIPSY AND STONE BASKET RETRIEVAL;  Surgeon: José Luis Stephens MD;  [facility];  Service: Urology    VT CYSTO BLADDER W/URETERAL CATHETERIZATION Bilateral 12/15/2023    Procedure: CYSTOSCOPY, BILATERAL  RETROGRADE PYELOGRAM , STENT EXCHANGE RIGHT , LEFT URETEROSCOPY ,  HOLMIUM LASER WITH INSERTION LEFT URETERAL STENT;  Surgeon: Derick Bhakta MD;  [facility];  Service: Urology    VT CYSTO BLADDER W/URETERAL CATHETERIZATION Left 2/8/2024    Procedure: CYSTOSCOPY B/L RETROGRADE PYELOGRAM WITH B/L T URETERALSTENT EXCHANGE,LEFT URETEROSCOPY, DILATION LEFT URETERAL STICTURE;  Surgeon: José Luis Stephens MD;  [facility];  Service: Urology    VT CYSTO BLADDER W/URETERAL CATHETERIZATION Bilateral 5/10/2024    Procedure: CYSTOSCOPY RETROGRADE PYELOGRAM WITH INSERTION STENT URETERAL;  Surgeon: José Luis Stephens MD;  [facility];  Service: Urology    VT CYSTO W/INSERT URETERAL STENT Bilateral 5/10/2024    Procedure: EXCHANGE STENT URETERAL;  Surgeon: José Luis Stephens MD;  [facility];  Service: Urology    VT CYSTO/URETERO W/LITHOTRIPSY &INDWELL STENT INSRT Bilateral 01/23/2023    Procedure: CYSTOSCOPY  RIGHT URETEROSCOPY WITH LITHOTRIPSY HOLMIUM LASER, BILATERAL RETROGRADE PYELOGRAM AND BILATERAL  URETERAL STENT INSERTION;  Surgeon: José Luis Stephens MD;  [facility];  Service: Urology    VT CYSTO/URETERO W/LITHOTRIPSY &INDWELL STENT INSRT Right 04/03/2023    Procedure: RIGHT URETEROSCOPY WITH LITHOTRIPSY HOLMIUM LASER, RETROGRADE PYELOGRAM; BILATERAL EXCHANGE STENT URETERAL;  Surgeon: José Luis Stephens MD;  [facility];  Service: Urology    VT CYSTO/URETERO W/LITHOTRIPSY &INDWELL  STENT INSRT Bilateral 10/11/2023    Procedure: CYSTOSCOPY URETEROSCOPY RETROGRADE PYELOGRAM AND INSERTION STENT URETERAL DIAGNOSTINC RIGHT URETEROSCOPY, REMOVAL STENT LEFT SIDE;  Surgeon: José Luis Stephens MD;  [facility];  Service: Urology    KS INSJ TUNNELED CTR VAD W/SUBQ PORT AGE 5 YR/> N/A 03/21/2023    Procedure: INSERTION VENOUS PORT ( PORT-A-CATH) IR;  Surgeon: Mark Amos DO;  [facility];  Service: Interventional Radiology    KS LAPS COLECTOMY PRTL W/COLOPXTSTMY LW ANAST N/A 8/17/2023    Procedure: RESECTION COLON LOW ANTERIOR LAPAROSCOPIC WITH ROBOTICS;  Surgeon: Sree Umanzor MD;  [facility];  Service: Colorectal    TONSILLECTOMY      URINARY SURGERY Bilateral     bilateral stents     Family History   Problem Relation Age of Onset    Cancer Mother     Cirrhosis Father     Breast cancer Neg Hx     Breast cancer additional onset Neg Hx      Social History     Socioeconomic History    Marital status:      Spouse name: Not on file    Number of children: Not on file    Years of education: Not on file    Highest education level: Not on file   Occupational History    Not on file   Tobacco Use    Smoking status: Never     Passive exposure: Past    Smokeless tobacco: Never   Vaping Use    Vaping status: Never Used   Substance and Sexual Activity    Alcohol use: Never    Drug use: Never    Sexual activity: Not Currently   Other Topics Concern    Not on file   Social History Narrative    From 4/14/23  note:    Emergency Contact: ANAY TREVIZO (ex-) [phone] (Mobile)    Marital Status:     Caregiver/Support: ex- -Anay and two dtrs.     Children: two dtrs- nataliia 21 in [place] and doretha 18 [facility]    Child/Elder care: no     Housing: two story house    Home Setup: one level    Lives With:  -anay and two dtrs    Daily Living Activities: independent    Durable Medical Equipment: No    Ambulation: independent     Employment: disabled-SSI-$100 and pension-$700     Status/Location: no     Ability to pay bills: yes. No barriers to paying monthly bills.      POA/LW/AD: no.  - Mateo is healthcare representative.  MSW emailed advance directive.          Social Determinants of Health     Financial Resource Strain: Low Risk  (11/30/2022)    Received from [employer] [employer]    Financial Resource Strain     In the last 12 months, did you worry that your food could run out before you got money to buy more? : No   Food Insecurity: No Food Insecurity (8/18/2023)    Hunger Vital Sign     Worried About Running Out of Food in the Last Year: Never true     Ran Out of Food in the Last Year: Never true   Transportation Needs: No Transportation Needs (8/18/2023)    PRAPARE - Transportation     Lack of Transportation (Medical): No     Lack of Transportation (Non-Medical): No   Physical Activity: Not on file   Stress: Not on file   Social Connections: Not on file   Intimate Partner Violence: Low Risk  (11/30/2022)    Received from [employer], [employer].    Violence     Does anyone in your life hurt you, threaten you, frighten you or make you feel unsafe?: No   Housing Stability: Unknown (8/18/2023)    Housing Stability Vital Sign     Unable to Pay for Housing in the Last Year: No     Number of Places Lived in the Last Year: Not on file     Unstable Housing in the Last Year: No       Current Outpatient Medications:     acetaminophen (TYLENOL) 325 mg tablet, Take 2 tablets (650 mg total) by mouth every 4 (four) hours as needed for mild pain, Disp: , Rfl:     acetaminophen (TYLENOL) 500 mg tablet, Take 2 tablets (1,000 mg total) by mouth 3 (three) times a day as needed for mild pain, moderate pain, headaches or fever, Disp: 180 tablet, Rfl: 11    amLODIPine (NORVASC) 10 mg tablet, TAKE 1 TABLET BY MOUTH EVERY DAY, Disp: 90 tablet, Rfl: 1     cholecalciferol 400 units tablet, Take 400 Units by mouth daily, Disp: , Rfl:     cyanocobalamin (VITAMIN B-12) 1000 MCG tablet, Take 1,000 mcg by mouth daily, Disp: , Rfl:     docusate sodium (COLACE) 100 mg capsule, Take 1 capsule (100 mg total) by mouth 2 (two) times a day for 15 days, Disp: 30 capsule, Rfl: 0    estrogens, conjugated (Premarin) vaginal cream, Insert 0.3 g into the vagina 3 (three) times a week, Disp: 30 g, Rfl: 6    fenofibrate (TRICOR) 145 mg tablet, Take 1 tablet (145 mg total) by mouth daily, Disp: 90 tablet, Rfl: 3    ferrous sulfate 324 (65 Fe) mg, Take 1 tablet (324 mg total) by mouth daily before breakfast, Disp: 90 tablet, Rfl: 0    naproxen (NAPROSYN) 500 mg tablet, Take 1 tablet (500 mg total) by mouth 2 (two) times a day with meals for 5 days For pain, Disp: 10 tablet, Rfl: 0    oxybutynin (DITROPAN) 5 mg tablet, Take 1 tablet (5 mg total) by mouth every 6 (six) hours as needed (bladder spasms), Disp: 30 tablet, Rfl: 0    oxybutynin (DITROPAN) 5 mg tablet, TAKE 1 TABLET (5 MG TOTAL) BY MOUTH EVERY 6 (SIX) HOURS AS NEEDED (BLADDER SPASMS), Disp: 30 tablet, Rfl: 5    phenazopyridine (PYRIDIUM) 200 mg tablet, Take 1 tablet (200 mg total) by mouth 3 (three) times a day as needed for bladder spasms, Disp: 30 tablet, Rfl: 0    potassium chloride (MICRO-K) 10 MEQ CR capsule, TAKE 4 CAPSULES (40 MEQ TOTAL) BY MOUTH 2 (TWO) TIMES A DAY, Disp: 240 capsule, Rfl: 1    predniSONE 5 mg tablet, Take 5 mg by mouth daily, Disp: , Rfl:     tamsulosin (FLOMAX) 0.4 mg, Take 1 capsule (0.4 mg total) by mouth daily with dinner, Disp: 15 capsule, Rfl: 0    ursodiol (ACTIGALL) 300 mg capsule, TAKE 3 CAPSULES (900 MG TOTAL) BY MOUTH IN THE MORNING, Disp: 90 capsule, Rfl: 1  Allergies   Allergen Reactions    Oxaliplatin Shortness Of Breath     Reactions occurred with 2nd and 3rd infusions (about 1-3 hours from initiation of infusion) and required treatment with steroids and antihistamines. Please refer to  "allergy note on 2/7/2023 for detailed description of her reactions.    Morphine Nausea Only     \"Every dose made me nauseous\" (oral dosing only, can tolerate IV morphine)    Potassium Chloride Other (See Comments)     Pt reports \"burning\" with Iv potassium administration and wishes for it to be added to chart     There were no vitals filed for this visit.      "

## 2024-07-02 NOTE — PROGRESS NOTES
Follow-up - Radiation Oncology   Danielle Haque 1965 58 y.o. female [id number]      History of Present Illness   Cancer Staging   Malignant neoplasm of sigmoid colon (HCC)  Staging form: Colon and Rectum, AJCC 8th Edition  - Clinical: Stage IIA (cT3, cN0, cM0) - Signed by Edouard Ferrera MD on 2/24/2023  Total positive nodes: 0    Rectal cancer (HCC)  Staging form: Colon and Rectum, AJCC 8th Edition  - Clinical: Stage IIA (cT3, cN0, cM0) - Signed by Edouard Ferrera MD on 9/6/2023  Total positive nodes: 0    Ms. Danielle Haque is a 58 year old woman with a history of sarcoidosis who presented with Stage IIA (cT3N0) mid to high rectal adenocarcinoma. She was started on Cape[place] in [place] and received 3 cycles with significant difficulty. She thereafter transferred her care to [facility] with decision to transition to chemoRT. On 5/25/23 she completed a course of neoadjuvant chemoRT to a dose of 5040 cGy in 28 fractions with concurrent 5-FU. She returns today for follow-up.      Interval History:  The patient was last seen in follow-up on 3/18/24 at that time doing fair overall with plan for observation.     CT C/A/P (6/24/24) demonstrated:  Stable bilateral pulmonary nodules, some which are cavitary, are unchanged.  Multiple low-density lesions throughout the spleen likely due to sarcoid, also unchanged.  Bilateral hydronephrosis, decreased on the left and slightly increased on the right.  Bilateral nonobstructing calculi unchanged.  No new findings in the chest abdomen or pelvis.     Currently the patient is doing fair overall.  She has multiple of ongoing symptoms related to her sarcoid and recurrent episodes of nephrolithiasis. She has not undergone ostomy reversal at this point but plans to review this with Dr. Umanzor at their next visit.      Historical Information   Oncology History   Malignant neoplasm of sigmoid colon (HCC)   10/28/2022 Biopsy    Colon, Rectosigmoid Colon Mass Biopsy (1 slide  [id number] [facility], collected 10/28/2022):  - Adenocarcinoma arising in a tubular adenoma     12/1/2022 Biopsy    DIAGNOSIS LIVER, SEGMENT 3, RESECTION:  - HYALINIZED SUBCAPSULAR AND INTRA-PARENCHYMAL NODULES. NO MALIGNANCY SEEN. SEE NOTE  - MILD MACROVESICULAR STEATOSIS (25%). NO EVIDENCE OF STEATOHEPATITIS. TRICHOME STAIN SHOWS MILD PORTAL FIBROSIS.   - 2+ IRON DEPOSITION WITHIN KUPFFER CELLS (IRON STAIN), CONSISTENT WITH SECONDARY HEMOCHROMATOSIS.     NOTE: THIS LIKELY REPRESENTS A MARKEDLY SCLEROTIC HEMANGIOMA OR CHANGES SECONDARY TO INJURY.               12/1/2022 Surgery    Laparoscopic diverting colostomy with liver biopsy. Dr. Serrano.      12/25/2022 Initial Diagnosis    Malignant neoplasm of sigmoid colon (HCC)     2022 - 1/10/2023 Chemotherapy    Oxaliplatin. [place], [place]     2/24/2023 -  Cancer Staged    Staging form: Colon and Rectum, AJCC 8th Edition  - Clinical: Stage IIA (cT3, cN0, cM0) - Signed by Edouard Ferrera MD on 2/24/2023  Total positive nodes: 0       4/17/2023 - 5/27/2023 Chemotherapy    potassium chloride, 20 mEq, Intravenous, Once, 3 of 3 cycles  Administration: 20 mEq (4/17/2023), 20 mEq (4/24/2023), 20 mEq (5/1/2023)  alteplase (CATHFLO), 2 mg, Intracatheter, Every 1 Minute as needed, 6 of 6 cycles  fluorouracil (ADRUCIL) ambulatory infusion Soln, 225 mg/m2/day = 1,880 mg, Intravenous, Over 120 hours, 6 of 6 cycles     4/17/2023 - 5/25/2023 Radiation    Treatments:  Course: C1    Plan ID Energy Fractions Dose per Fraction (cGy) Dose Correction (cGy) Total Dose Delivered (cGy) Elapsed Days   CD Rectum 6X 3 / 3 180 0 540 2   Whole Pelvis 6X 25 / 25 180 0 4,500 35      Treatment Dates:  4/17/2023 - 5/25/2023.      Rectal cancer (HCC)   7/14/2023 Initial Diagnosis    Rectal cancer (HCC)     9/6/2023 -  Cancer Staged    Staging form: Colon and Rectum, AJCC 8th Edition  - Clinical: Stage IIA (cT3, cN0, cM0) - Signed by Edouard Ferrera MD on  9/6/2023  Total positive nodes: 0           Past Medical History:   Diagnosis Date    Bleeding from colostomy stoma (HCC)      states ileostomy , not colostomy    Cancer (HCC)     Colon cancer (HCC)     Fall     Headache     Hemochromatosis, unspecified     History of transfusion     2022 - no adverse reaction    Hypertension     Kidney calculi     Kidney stone     Liver disease     hemangioma    Muscle weakness     Personal history of COVID-19 12/2022    Sarcoidosis     Seizures (HCC)     2022    Shingles      Past Surgical History:   Procedure Laterality Date    ABSCESS DRAINAGE      left breast    BREAST BIOPSY      CHEST TUBE INSERTION      COLON SURGERY      colostomy    COLONOSCOPY      FL GUIDED CENTRAL VENOUS ACCESS DEVICE INSERTION  03/21/2023    FL RETROGRADE PYELOGRAM  01/23/2023    FL RETROGRADE PYELOGRAM  04/03/2023    FL RETROGRADE PYELOGRAM  7/21/2023    FL RETROGRADE PYELOGRAM  10/11/2023    FL RETROGRADE PYELOGRAM  12/15/2023    FL RETROGRADE PYELOGRAM  2/8/2024    FL RETROGRADE PYELOGRAM  5/10/2024    IR BIOPSY LIVER MASS  05/09/2023    LUNG BIOPSY      VA CYSTO BLADDER W/URETERAL CATHETERIZATION Bilateral 07/21/2023    Procedure: CYSTOSCOPY URETEROSCOPY RETROGRADE PYELOGRAM WITH BILATERAL STENT URETERAL EXCHANGE; LEFT LASER LITHOTRIPSY AND STONE BASKET RETRIEVAL;  Surgeon: José Luis Stephens MD;  [facility];  Service: Urology    VA CYSTO BLADDER W/URETERAL CATHETERIZATION Bilateral 12/15/2023    Procedure: CYSTOSCOPY, BILATERAL  RETROGRADE PYELOGRAM , STENT EXCHANGE RIGHT , LEFT URETEROSCOPY ,  HOLMIUM LASER WITH INSERTION LEFT URETERAL STENT;  Surgeon: Derick Bhakta MD;  [facility];  Service: Urology    VA CYSTO BLADDER W/URETERAL CATHETERIZATION Left 2/8/2024    Procedure: CYSTOSCOPY B/L RETROGRADE PYELOGRAM WITH B/L T URETERALSTENT EXCHANGE,LEFT URETEROSCOPY, DILATION LEFT URETERAL STICTURE;  Surgeon: José Luis Stephens MD;  [facility];   Service: Urology    HI CYSTO BLADDER W/URETERAL CATHETERIZATION Bilateral 5/10/2024    Procedure: CYSTOSCOPY RETROGRADE PYELOGRAM WITH INSERTION STENT URETERAL;  Surgeon: José Luis Stephens MD;  [facility];  Service: Urology    HI CYSTO W/INSERT URETERAL STENT Bilateral 5/10/2024    Procedure: EXCHANGE STENT URETERAL;  Surgeon: José Luis Stephens MD;  [facility];  Service: Urology    HI CYSTO/URETERO W/LITHOTRIPSY &INDWELL STENT INSRT Bilateral 01/23/2023    Procedure: CYSTOSCOPY  RIGHT URETEROSCOPY WITH LITHOTRIPSY HOLMIUM LASER, BILATERAL RETROGRADE PYELOGRAM AND BILATERAL  URETERAL STENT INSERTION;  Surgeon: José Luis Stephens MD;  [facility];  Service: Urology    HI CYSTO/URETERO W/LITHOTRIPSY &INDWELL STENT INSRT Right 04/03/2023    Procedure: RIGHT URETEROSCOPY WITH LITHOTRIPSY HOLMIUM LASER, RETROGRADE PYELOGRAM; BILATERAL EXCHANGE STENT URETERAL;  Surgeon: José Luis Stephens MD;  [facility];  Service: Urology    HI CYSTO/URETERO W/LITHOTRIPSY &INDWELL STENT INSRT Bilateral 10/11/2023    Procedure: CYSTOSCOPY URETEROSCOPY RETROGRADE PYELOGRAM AND INSERTION STENT URETERAL DIAGNOSTINC RIGHT URETEROSCOPY, REMOVAL STENT LEFT SIDE;  Surgeon: José Luis Stephens MD;  [facility];  Service: Urology    HI INSJ TUNNELED CTR VAD W/SUBQ PORT AGE 5 YR/> N/A 03/21/2023    Procedure: INSERTION VENOUS PORT ( PORT-A-CATH) IR;  Surgeon: Mark Amos DO;  [facility];  Service: Interventional Radiology    HI LAPS COLECTOMY PRTL W/COLOPXTSTMY LW ANAST N/A 8/17/2023    Procedure: RESECTION COLON LOW ANTERIOR LAPAROSCOPIC WITH ROBOTICS;  Surgeon: Sree Umanzor MD;  [facility];  Service: Colorectal    TONSILLECTOMY      URINARY SURGERY Bilateral     bilateral stents       Social History   Social History     Substance and Sexual Activity   Alcohol Use Never     Social History     Substance and Sexual Activity   Drug Use Never      Social History     Tobacco Use   Smoking Status Never    Passive exposure: Past   Smokeless Tobacco Never         Meds/Allergies     Current Outpatient Medications:     acetaminophen (TYLENOL) 325 mg tablet, Take 2 tablets (650 mg total) by mouth every 4 (four) hours as needed for mild pain, Disp: , Rfl:     acetaminophen (TYLENOL) 500 mg tablet, Take 2 tablets (1,000 mg total) by mouth 3 (three) times a day as needed for mild pain, moderate pain, headaches or fever, Disp: 180 tablet, Rfl: 11    amLODIPine (NORVASC) 10 mg tablet, TAKE 1 TABLET BY MOUTH EVERY DAY, Disp: 90 tablet, Rfl: 1    cholecalciferol 400 units tablet, Take 400 Units by mouth daily, Disp: , Rfl:     cyanocobalamin (VITAMIN B-12) 1000 MCG tablet, Take 1,000 mcg by mouth daily, Disp: , Rfl:     estrogens, conjugated (Premarin) vaginal cream, Insert 0.3 g into the vagina 3 (three) times a week, Disp: 30 g, Rfl: 6    fenofibrate (TRICOR) 145 mg tablet, Take 1 tablet (145 mg total) by mouth daily, Disp: 90 tablet, Rfl: 3    ferrous sulfate 324 (65 Fe) mg, Take 1 tablet (324 mg total) by mouth daily before breakfast, Disp: 90 tablet, Rfl: 0    oxybutynin (DITROPAN) 5 mg tablet, Take 1 tablet (5 mg total) by mouth every 6 (six) hours as needed (bladder spasms), Disp: 30 tablet, Rfl: 0    oxybutynin (DITROPAN) 5 mg tablet, TAKE 1 TABLET (5 MG TOTAL) BY MOUTH EVERY 6 (SIX) HOURS AS NEEDED (BLADDER SPASMS), Disp: 30 tablet, Rfl: 5    phenazopyridine (PYRIDIUM) 200 mg tablet, Take 1 tablet (200 mg total) by mouth 3 (three) times a day as needed for bladder spasms, Disp: 30 tablet, Rfl: 0    potassium chloride (MICRO-K) 10 MEQ CR capsule, TAKE 4 CAPSULES (40 MEQ TOTAL) BY MOUTH 2 (TWO) TIMES A DAY, Disp: 240 capsule, Rfl: 1    predniSONE 5 mg tablet, Take 5 mg by mouth daily, Disp: , Rfl:     tamsulosin (FLOMAX) 0.4 mg, Take 1 capsule (0.4 mg total) by mouth daily with dinner, Disp: 15 capsule, Rfl: 0    ursodiol (ACTIGALL) 300 mg capsule, TAKE 3  "CAPSULES (900 MG TOTAL) BY MOUTH IN THE MORNING, Disp: 90 capsule, Rfl: 1    docusate sodium (COLACE) 100 mg capsule, Take 1 capsule (100 mg total) by mouth 2 (two) times a day for 15 days, Disp: 30 capsule, Rfl: 0    naproxen (NAPROSYN) 500 mg tablet, Take 1 tablet (500 mg total) by mouth 2 (two) times a day with meals for 5 days For pain, Disp: 10 tablet, Rfl: 0  Allergies   Allergen Reactions    Oxaliplatin Shortness Of Breath     Reactions occurred with 2nd and 3rd infusions (about 1-3 hours from initiation of infusion) and required treatment with steroids and antihistamines. Please refer to allergy note on 2/7/2023 for detailed description of her reactions.    Morphine Nausea Only     \"Every dose made me nauseous\" (oral dosing only, can tolerate IV morphine)    Potassium Chloride Other (See Comments)     Pt reports \"burning\" with Iv potassium administration and wishes for it to be added to chart     Review of Systems   Constitutional: Negative.    HENT: Negative.     Eyes: Negative.    Respiratory: Negative.     Cardiovascular: Negative.    Gastrointestinal: Negative.    Endocrine: Negative.    Genitourinary:  Positive for dysuria and pelvic pain.   Musculoskeletal:  Positive for back pain.   Skin: Negative.    Allergic/Immunologic: Negative.    Neurological:  Positive for dizziness (early in the am).   Hematological: Negative.    Psychiatric/Behavioral: Negative.    OBJECTIVE:   /78 (BP Location: Left arm, Patient Position: Sitting, Cuff Size: Standard)   Pulse 98   Temp (!) 97.2 °F (36.2 °C) (Temporal)   Resp 18   Ht 5' 2\" (1.575 m)   Wt 59.4 kg (131 lb)   SpO2 98%   BMI 23.96 kg/m²   Pain Assessment:  0  ECOG/Zubrod/WHO: 1 - Symptomatic but completely ambulatory    Physical Exam   Well appearing. NAD.   No increased work of breathing.   Extremities warm and well perfused.     Assessment/Plan:  Orders Placed This Encounter   Procedures    CT chest abdomen pelvis w contrast      We reviewed the " "patient's recent repeat CT chest/abdomen/pelvis in comparison to pretreatment imaging.  On my review she has stable to minimally enlarged diffuse pulmonary nodules consistent with her bilateral pulmonary sarcoid with no new areas of significant growth or change concerning for recurrence or metastatic disease. I have recommended repeat CT C/A/P in 3 months with RTC thereafter.     We discussed that while at this time there is not an indication for biopsy, she may need additional specialist follow-up for management of her sarcoidosis. We discussed that pulmonary sarcoidosis is typically managed by pulmonology. At this time it is unclear to me who is the primary point person for her sarcoid. I have encouraged her to discuss with her other managing medical providers in order to confirm that additional therapy (e.g. prednisone, immunosuppressive therapy).     Edouard Ferrera MD  7/2/2024,8:26 AM    Total time spent reviewing EMR, seeing patient in clinic visit, documenting visit, placing treatment orders, and communicating with other medical providers on date of visit: 40 minutes.     Portions of the record may have been created with voice recognition software.  Occasional wrong word or \"sound a like\" substitutions may have occurred due to the inherent limitations of voice recognition software.  Read the chart carefully and recognize, using context, where substitutions have occurred.        "

## 2024-07-03 DIAGNOSIS — D50.9 IRON DEFICIENCY ANEMIA, UNSPECIFIED IRON DEFICIENCY ANEMIA TYPE: ICD-10-CM

## 2024-07-03 RX ORDER — FERROUS SULFATE 324(65)MG
324 TABLET, DELAYED RELEASE (ENTERIC COATED) ORAL
Qty: 30 TABLET | Refills: 0 | Status: SHIPPED | OUTPATIENT
Start: 2024-07-03

## 2024-07-03 NOTE — TELEPHONE ENCOUNTER
Patient needs updated blood work. Please place orders. A courtesy refill was provided.    Iron panel is needed

## 2024-07-08 NOTE — PROGRESS NOTES
Referring Physician: Leena Anaya MD  A copy of this note was sent to the referring physician.       Diagnoses and all orders for this visit:    Ureteral stricture  -     Urine culture  -     Case request operating room: CYSTOSCOPY URETEROSCOPY WITH LITHOTRIPSY HOLMIUM LASER, treatment of LEFT ureteral stricture, RETROGRADE PYELOGRAM AND BILATERAL STENT EXCHANGE; Standing  -     Case request operating room: CYSTOSCOPY URETEROSCOPY WITH LITHOTRIPSY HOLMIUM LASER, treatment of LEFT ureteral stricture, RETROGRADE PYELOGRAM AND BILATERAL STENT EXCHANGE    Ureteral calculi with hydroureteronephrosis  -     Urine culture  -     Case request operating room: CYSTOSCOPY URETEROSCOPY WITH LITHOTRIPSY HOLMIUM LASER, treatment of LEFT ureteral stricture, RETROGRADE PYELOGRAM AND BILATERAL STENT EXCHANGE; Standing  -     Case request operating room: CYSTOSCOPY URETEROSCOPY WITH LITHOTRIPSY HOLMIUM LASER, treatment of LEFT ureteral stricture, RETROGRADE PYELOGRAM AND BILATERAL STENT EXCHANGE    Other orders  -     Place sequential compression device; Standing  -     ceFAZolin (ANCEF) 1,000 mg in sodium chloride 0.9 % 50 mL IVPB            Assessment and plan:       1.  complex nephrolithiasis  -Status post multiple endoscopic procedures, 6 ureteroscopy/stent exchanges performed since January 2023    2.  Bilateral ureteral stricture disease  -Maintained with bilateral ureteral stents, last exchanged May 10, 2024  - RIGHT proximal ureteral stricture is recalcitrant to endoscopic management, long and heavily calcified.  -I believe the patient will require a reconstructive procedure on the right side to rid her of her stent  - LEFT distal ureteral stricture    3.  Bilateral indwelling ureteral stents    #4 advanced colorectal cancer now status post low anterior resection - [facility]    #5 complex pelvic pain    It is a pleasure to evaluate the patient again.  She does have a significant source of morbidity in the form of chronic pelvic pain.   I think that there are multiple factors for this including her advanced pelvic malignancy, history of pelvic radiation, but clearly the ureteral stents are playing a role in this.  As such we outlined a plan to ideally rid her of her ureteral stents    I believe that the left ureteral stricture may be amenable to further endoscopic management.  In my opinion the right ureteral stricture is recalcitrant and no longer amenable to endoscopic treatment and will require referral to a reconstructive urologist, likely for a ureteroureterotomy with buccal mucosal grafting.    Patient is amenable to moving forward.  She also has an upcoming follow-up with Dr. Umanzor to discuss potential ileostomy reversal.  She understands that the ureteral reconstruction could not be performed at the same time of that procedure as there would be a high risk of intra-abdominal fluid collection and fistulization.    The timing of which major abdominal surgery should be completed first I think is still for discussion.  Nevertheless we will move forward with minimally invasive endoscopic treatment of her left ureteral stricture in the near future.    Informed consent obtained for left ureteroscopy with treatment of ureteral stricture and stone disease.  Her right ureteral stent will be exchanged at that time.      José Luis Stephens MD      Chief Complaint     Follow-up ureteral stricture and stone disease      History of Present Illness     Danielle Haque is a 58 y.o. complex nephrolithiasis and ureteral stricture disease returns in follow-up    Detailed Urologic History     - please refer to HPI    Review of Systems     Review of Systems   Constitutional: Negative for activity change and fatigue.   HENT: Negative for congestion.    Eyes: Negative for visual disturbance.   Respiratory: Negative for shortness of breath and wheezing.    Cardiovascular: Negative for chest pain and leg swelling.   Gastrointestinal: Negative for abdominal  "pain.   Endocrine: Negative for polyuria.   Genitourinary: Negative for dysuria, flank pain, hematuria and urgency.   Musculoskeletal: Negative for back pain.   Allergic/Immunologic: Negative for immunocompromised state.   Neurological: Negative for dizziness and numbness.   Psychiatric/Behavioral: Negative for dysphoric mood.   All other systems reviewed and are negative.          Allergies     Allergies   Allergen Reactions    Oxaliplatin Shortness Of Breath     Reactions occurred with 2nd and 3rd infusions (about 1-3 hours from initiation of infusion) and required treatment with steroids and antihistamines. Please refer to allergy note on 2/7/2023 for detailed description of her reactions.    Morphine Nausea Only     \"Every dose made me nauseous\" (oral dosing only, can tolerate IV morphine)    Potassium Chloride Other (See Comments)     Pt reports \"burning\" with Iv potassium administration and wishes for it to be added to chart       Physical Exam       Physical Exam  Constitutional:       General: He is not in acute distress.     Appearance: He is well-developed.   HENT:      Head: Normocephalic and atraumatic.   Cardiovascular:      Comments: Negative lower extremity edema  Pulmonary:      Effort: Pulmonary effort is normal.      Breath sounds: Normal breath sounds.   Abdominal:      Palpations: Abdomen is soft.   Musculoskeletal:         General: Normal range of motion.      Cervical back: Normal range of motion.   Skin:     General: Skin is warm.   Neurological:      Mental Status: He is alert and oriented to person, place, and time.   Psychiatric:         Behavior: Behavior normal.           Vital Signs  There were no vitals filed for this visit.      Current Medications       Current Outpatient Medications:     acetaminophen (TYLENOL) 325 mg tablet, Take 2 tablets (650 mg total) by mouth every 4 (four) hours as needed for mild pain, Disp: , Rfl:     acetaminophen (TYLENOL) 500 mg tablet, Take 2 tablets " (1,000 mg total) by mouth 3 (three) times a day as needed for mild pain, moderate pain, headaches or fever, Disp: 180 tablet, Rfl: 11    amLODIPine (NORVASC) 10 mg tablet, TAKE 1 TABLET BY MOUTH EVERY DAY, Disp: 90 tablet, Rfl: 1    cholecalciferol 400 units tablet, Take 400 Units by mouth daily, Disp: , Rfl:     cyanocobalamin (VITAMIN B-12) 1000 MCG tablet, Take 1,000 mcg by mouth daily, Disp: , Rfl:     docusate sodium (COLACE) 100 mg capsule, Take 1 capsule (100 mg total) by mouth 2 (two) times a day for 15 days, Disp: 30 capsule, Rfl: 0    estrogens, conjugated (Premarin) vaginal cream, Insert 0.3 g into the vagina 3 (three) times a week, Disp: 30 g, Rfl: 6    fenofibrate (TRICOR) 145 mg tablet, Take 1 tablet (145 mg total) by mouth daily, Disp: 90 tablet, Rfl: 3    ferrous sulfate 324 (65 Fe) mg, Take 1 tablet (324 mg total) by mouth daily before breakfast, Disp: 30 tablet, Rfl: 0    naproxen (NAPROSYN) 500 mg tablet, Take 1 tablet (500 mg total) by mouth 2 (two) times a day with meals for 5 days For pain, Disp: 10 tablet, Rfl: 0    oxybutynin (DITROPAN) 5 mg tablet, Take 1 tablet (5 mg total) by mouth every 6 (six) hours as needed (bladder spasms), Disp: 30 tablet, Rfl: 0    oxybutynin (DITROPAN) 5 mg tablet, TAKE 1 TABLET (5 MG TOTAL) BY MOUTH EVERY 6 (SIX) HOURS AS NEEDED (BLADDER SPASMS), Disp: 30 tablet, Rfl: 5    phenazopyridine (PYRIDIUM) 200 mg tablet, Take 1 tablet (200 mg total) by mouth 3 (three) times a day as needed for bladder spasms, Disp: 30 tablet, Rfl: 0    potassium chloride (MICRO-K) 10 MEQ CR capsule, TAKE 4 CAPSULES (40 MEQ TOTAL) BY MOUTH 2 (TWO) TIMES A DAY, Disp: 240 capsule, Rfl: 1    predniSONE 5 mg tablet, Take 5 mg by mouth daily, Disp: , Rfl:     tamsulosin (FLOMAX) 0.4 mg, Take 1 capsule (0.4 mg total) by mouth daily with dinner, Disp: 15 capsule, Rfl: 0    ursodiol (ACTIGALL) 300 mg capsule, TAKE 3 CAPSULES (900 MG TOTAL) BY MOUTH IN THE MORNING, Disp: 90 capsule, Rfl:  1      Active Problems     Patient Active Problem List   Diagnosis    Malignant neoplasm of sigmoid colon (HCC)    History of Clostridium difficile colitis    Other hemochromatosis    Hypertension    Sarcoidosis    Impaired fasting glucose    Hypokalemia    Transaminitis    Hydronephrosis due to ureteral stricture    Adrenal nodule (HCC)    Other specified anemias    Nonrheumatic mitral valve regurgitation    Advance directive discussed with patient    Skin lesion    Kidney calculi    Dysuria    Chest pain    Port-A-Cath in place    Drug-induced constipation    Palliative care patient    Cancer related pain    Rectal cancer (HCC)    Annual physical exam    Preop examination    Shingles    Partial small bowel obstruction (HCC)    Scalp pain    Left ear pain    Chronic anticoagulation    Neuralgia involving scalp    Anxiety about health    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    Ureteral stricture    Ureteral calculi with hydroureteronephrosis    Pulmonary nodules    Lump of skin    Iron deficiency anemia    THOR (acute kidney injury) (HCC)    Hypomagnesemia         Past Medical History     Past Medical History:   Diagnosis Date    Bleeding from colostomy stoma (HCC)      states ileostomy , not colostomy    Cancer (HCC)     Colon cancer (HCC)     Fall     Headache     Hemochromatosis, unspecified     History of transfusion     2022 - no adverse reaction    Hypertension     Kidney calculi     Kidney stone     Liver disease     hemangioma    Muscle weakness     Personal history of COVID-19 12/2022    Sarcoidosis     Seizures (HCC)     2022    Shingles          Surgical History     Past Surgical History:   Procedure Laterality Date    ABSCESS DRAINAGE      left breast    BREAST BIOPSY      CHEST TUBE INSERTION      COLON SURGERY      colostomy    COLONOSCOPY      FL GUIDED CENTRAL VENOUS ACCESS DEVICE INSERTION  03/21/2023    FL RETROGRADE PYELOGRAM  01/23/2023    FL RETROGRADE PYELOGRAM  04/03/2023     FL RETROGRADE PYELOGRAM  7/21/2023    FL RETROGRADE PYELOGRAM  10/11/2023    FL RETROGRADE PYELOGRAM  12/15/2023    FL RETROGRADE PYELOGRAM  2/8/2024    FL RETROGRADE PYELOGRAM  5/10/2024    IR BIOPSY LIVER MASS  05/09/2023    LUNG BIOPSY      KY CYSTO BLADDER W/URETERAL CATHETERIZATION Bilateral 07/21/2023    Procedure: CYSTOSCOPY URETEROSCOPY RETROGRADE PYELOGRAM WITH BILATERAL STENT URETERAL EXCHANGE; LEFT LASER LITHOTRIPSY AND STONE BASKET RETRIEVAL;  Surgeon: José Luis Stephens MD;  [facility];  Service: Urology    KY CYSTO BLADDER W/URETERAL CATHETERIZATION Bilateral 12/15/2023    Procedure: CYSTOSCOPY, BILATERAL  RETROGRADE PYELOGRAM , STENT EXCHANGE RIGHT , LEFT URETEROSCOPY ,  HOLMIUM LASER WITH INSERTION LEFT URETERAL STENT;  Surgeon: Derick Bhakta MD;  [facility];  Service: Urology    KY CYSTO BLADDER W/URETERAL CATHETERIZATION Left 2/8/2024    Procedure: CYSTOSCOPY B/L RETROGRADE PYELOGRAM WITH B/L T URETERALSTENT EXCHANGE,LEFT URETEROSCOPY, DILATION LEFT URETERAL STICTURE;  Surgeon: José Luis Stephens MD;  [facility];  Service: Urology    KY CYSTO BLADDER W/URETERAL CATHETERIZATION Bilateral 5/10/2024    Procedure: CYSTOSCOPY RETROGRADE PYELOGRAM WITH INSERTION STENT URETERAL;  Surgeon: José Luis Stephens MD;  [facility];  Service: Urology    KY CYSTO W/INSERT URETERAL STENT Bilateral 5/10/2024    Procedure: EXCHANGE STENT URETERAL;  Surgeon: José Luis Stephens MD;  [facility];  Service: Urology    KY CYSTO/URETERO W/LITHOTRIPSY &INDWELL STENT INSRT Bilateral 01/23/2023    Procedure: CYSTOSCOPY  RIGHT URETEROSCOPY WITH LITHOTRIPSY HOLMIUM LASER, BILATERAL RETROGRADE PYELOGRAM AND BILATERAL  URETERAL STENT INSERTION;  Surgeon: José Luis Stephens MD;  [facility];  Service: Urology    KY CYSTO/URETERO W/LITHOTRIPSY &INDWELL STENT INSRT Right 04/03/2023    Procedure: RIGHT URETEROSCOPY WITH LITHOTRIPSY HOLMIUM LASER,  "RETROGRADE PYELOGRAM; BILATERAL EXCHANGE STENT URETERAL;  Surgeon: José Luis Stephens MD;  [facility];  Service: Urology    IA CYSTO/URETERO W/LITHOTRIPSY &INDWELL STENT INSRT Bilateral 10/11/2023    Procedure: CYSTOSCOPY URETEROSCOPY RETROGRADE PYELOGRAM AND INSERTION STENT URETERAL DIAGNOSTINC RIGHT URETEROSCOPY, REMOVAL STENT LEFT SIDE;  Surgeon: José Luis Stephens MD;  [facility];  Service: Urology    IA INSJ TUNNELED CTR VAD W/SUBQ PORT AGE 5 YR/> N/A 03/21/2023    Procedure: INSERTION VENOUS PORT ( PORT-A-CATH) IR;  Surgeon: Mark Amos DO;  [facility];  Service: Interventional Radiology    IA LAPS COLECTOMY PRTL W/COLOPXTSTMY LW ANAST N/A 8/17/2023    Procedure: RESECTION COLON LOW ANTERIOR LAPAROSCOPIC WITH ROBOTICS;  Surgeon: Sree Umanzor MD;  [facility];  Service: Colorectal    TONSILLECTOMY      URINARY SURGERY Bilateral     bilateral stents         Family History     Family History   Problem Relation Age of Onset    Cancer Mother     Cirrhosis Father     Breast cancer Neg Hx     Breast cancer additional onset Neg Hx          Social History     Social History     Social History     Tobacco Use   Smoking Status Never    Passive exposure: Past   Smokeless Tobacco Never         Pertinent Lab Values     Lab Results   Component Value Date    CREATININE 1.31 (H) 06/17/2024       No results found for: \"PSA\"    @RESULTRCNT(1H])@      Pertinent Imaging       CT chest abdomen pelvis w contrast    Result Date: 6/27/2024  Narrative: CT CHEST, ABDOMEN AND PELVIS WITH IV CONTRAST INDICATION: R91.8: Other nonspecific abnormal finding of lung field C20: Malignant neoplasm of rectum. COMPARISON: Comparison is made to prior studies, the most recent CT scan is dated March 11, 2024. TECHNIQUE: CT examination of the chest, abdomen and pelvis was performed. Multiplanar 2D reformatted images were created from the source data. This examination, like all CT scans " performed in the [facility], was performed utilizing techniques to minimize radiation dose exposure, including the use of iterative reconstruction and automated exposure control. Radiation dose length product (DLP) for this visit: 541.65 mGy-cm IV Contrast: 85 mL of iohexol (OMNIPAQUE) Enteric Contrast: Not administered. FINDINGS: CHEST LUNGS: Bilateral pulmonary nodules, some which are cavitary, are unchanged. Index nodule at are as follows: 1.4 x 1 cm nodule in the lingula (4, 143) previously measured 1.5 x 0.9 cm. Index left lower lobe nodule measures 0.7 cm unchanged (series 4 image 169). 0.7 cm nodule centrally in the right lower lobe (4, 177) is unchanged. . Nodularity along the fissures also unchanged compatible with known sarcoidosis. PLEURA: Unremarkable. HEART/GREAT VESSELS: Heart is unremarkable for patient's age. No thoracic aortic aneurysm. MEDIASTINUM AND DUSTY: Unremarkable. CHEST WALL AND LOWER NECK: Mediport catheter with tip at the cavoatrial junction. Stable size of 1.7 cm nodule in the right lobe of the thyroid gland. ABDOMEN LIVER/BILIARY TREE: Unremarkable. GALLBLADDER: No calcified gallstones. No pericholecystic inflammatory change. SPLEEN: Multiple hypodense nodules throughout the spleen are unchanged compatible with known sarcoidosis. 1 cm nodule right adrenal gland is unchanged. Left adrenal gland is normal. PANCREAS: Unremarkable. ADRENAL GLANDS: Unremarkable. KIDNEYS/URETERS: Bilateral ureteral stents are in appropriate position, with slight increase in pelvicaliectasis on the right and slight decrease on the left. Bilateral nonobstructing calculi are unchanged. Focal area of encrustation about the proximal right stent is also unchanged. STOMACH AND BOWEL: Right upper quadrant diverting loop ileostomy. Rectal anastomosis is intact. No evidence of locally recurrent tumor. No bowel obstruction. APPENDIX: No findings to suggest appendicitis. ABDOMINOPELVIC CAVITY: No ascites.  "No pneumoperitoneum. No lymphadenopathy. VESSELS: Unremarkable for patient's age. PELVIS REPRODUCTIVE ORGANS: Unremarkable for patient's age. URINARY BLADDER: Collapsed and not well visualized but grossly unremarkable. ABDOMINAL WALL/INGUINAL REGIONS: Unremarkable. BONES: No acute fracture or suspicious osseous lesion.     Impression: Stable bilateral pulmonary nodules, some which are cavitary, are unchanged. Multiple low-density lesions throughout the spleen likely due to sarcoid, also unchanged. Bilateral hydronephrosis, decreased on the left and slightly increased on the right. Bilateral nonobstructing calculi unchanged. No new findings in the chest abdomen or pelvis. Workstation performed: [id number]         Portions of the record may have been created with voice recognition software.  Occasional wrong word or \"sound a like\" substitutions may have occurred due to the inherent limitations of voice recognition software.  In addition some of the content generated from this outpatient encounter includes information designed for patient education and/or communication back to the referring provider.  Read the chart carefully and recognize, using context, where substitutions have occurred.    "

## 2024-07-09 ENCOUNTER — VBI (OUTPATIENT)
Dept: ADMINISTRATIVE | Facility: OTHER | Age: 59
End: 2024-07-09

## 2024-07-09 ENCOUNTER — OFFICE VISIT (OUTPATIENT)
Dept: UROLOGY | Facility: CLINIC | Age: 59
End: 2024-07-09
Payer: COMMERCIAL

## 2024-07-09 VITALS
OXYGEN SATURATION: 99 % | WEIGHT: 128.8 LBS | BODY MASS INDEX: 23.7 KG/M2 | HEART RATE: 87 BPM | HEIGHT: 62 IN | DIASTOLIC BLOOD PRESSURE: 72 MMHG | SYSTOLIC BLOOD PRESSURE: 118 MMHG

## 2024-07-09 DIAGNOSIS — N20.1 URETERAL CALCULI: ICD-10-CM

## 2024-07-09 DIAGNOSIS — N13.5 URETERAL STRICTURE: Primary | ICD-10-CM

## 2024-07-09 PROCEDURE — 99214 OFFICE O/P EST MOD 30 MIN: CPT | Performed by: UROLOGY

## 2024-07-09 PROCEDURE — 87086 URINE CULTURE/COLONY COUNT: CPT | Performed by: UROLOGY

## 2024-07-09 NOTE — TELEPHONE ENCOUNTER
07/09/24 8:43 AM     Chart reviewed for Pap Smear (HPV) aka Cervical Cancer Screening ; nothing is submitted to the patient's insurance at this time.     Nell Fitzgerald   [facility]

## 2024-07-11 LAB — BACTERIA UR CULT: NORMAL

## 2024-07-17 ENCOUNTER — HOSPITAL ENCOUNTER (OUTPATIENT)
Dept: INFUSION CENTER | Facility: CLINIC | Age: 59
Discharge: HOME | End: 2024-07-17
Payer: COMMERCIAL

## 2024-07-17 DIAGNOSIS — D86.9 SARCOIDOSIS: ICD-10-CM

## 2024-07-17 DIAGNOSIS — N17.9 AKI (ACUTE KIDNEY INJURY) (HCC): ICD-10-CM

## 2024-07-17 DIAGNOSIS — R79.89 ABNORMAL LIVER FUNCTION TESTS: ICD-10-CM

## 2024-07-17 DIAGNOSIS — Z95.828 PORT-A-CATH IN PLACE: Primary | ICD-10-CM

## 2024-07-17 LAB
ALBUMIN SERPL BCG-MCNC: 3.9 G/DL (ref 3.5–5)
ALP SERPL-CCNC: 94 U/L (ref 34–104)
ALT SERPL W P-5'-P-CCNC: 29 U/L (ref 7–52)
ANION GAP SERPL CALCULATED.3IONS-SCNC: 5 MMOL/L (ref 4–13)
AST SERPL W P-5'-P-CCNC: 26 U/L (ref 13–39)
BASOPHILS # BLD AUTO: 0.02 THOUSANDS/ÂΜL (ref 0–0.1)
BASOPHILS NFR BLD AUTO: 0 % (ref 0–1)
BILIRUB SERPL-MCNC: 0.64 MG/DL (ref 0.2–1)
BUN SERPL-MCNC: 23 MG/DL (ref 5–25)
CALCIUM SERPL-MCNC: 9.7 MG/DL (ref 8.4–10.2)
CHLORIDE SERPL-SCNC: 108 MMOL/L (ref 96–108)
CO2 SERPL-SCNC: 26 MMOL/L (ref 21–32)
CREAT SERPL-MCNC: 1.11 MG/DL (ref 0.6–1.3)
EOSINOPHIL # BLD AUTO: 0.36 THOUSAND/ÂΜL (ref 0–0.61)
EOSINOPHIL NFR BLD AUTO: 7 % (ref 0–6)
ERYTHROCYTE [DISTWIDTH] IN BLOOD BY AUTOMATED COUNT: 13.8 % (ref 11.6–15.1)
GFR SERPL CREATININE-BSD FRML MDRD: 55 ML/MIN/1.73SQ M
GLUCOSE P FAST SERPL-MCNC: 88 MG/DL (ref 65–99)
GLUCOSE SERPL-MCNC: 88 MG/DL (ref 65–140)
HCT VFR BLD AUTO: 31.1 % (ref 34.8–46.1)
HGB BLD-MCNC: 10 G/DL (ref 11.5–15.4)
IMM GRANULOCYTES # BLD AUTO: 0.03 THOUSAND/UL (ref 0–0.2)
IMM GRANULOCYTES NFR BLD AUTO: 1 % (ref 0–2)
INR PPP: 0.99 (ref 0.84–1.19)
LYMPHOCYTES # BLD AUTO: 0.33 THOUSANDS/ÂΜL (ref 0.6–4.47)
LYMPHOCYTES NFR BLD AUTO: 7 % (ref 14–44)
MCH RBC QN AUTO: 28.4 PG (ref 26.8–34.3)
MCHC RBC AUTO-ENTMCNC: 32.2 G/DL (ref 31.4–37.4)
MCV RBC AUTO: 88 FL (ref 82–98)
MONOCYTES # BLD AUTO: 0.37 THOUSAND/ÂΜL (ref 0.17–1.22)
MONOCYTES NFR BLD AUTO: 8 % (ref 4–12)
NEUTROPHILS # BLD AUTO: 3.79 THOUSANDS/ÂΜL (ref 1.85–7.62)
NEUTS SEG NFR BLD AUTO: 77 % (ref 43–75)
NRBC BLD AUTO-RTO: 0 /100 WBCS
PLATELET # BLD AUTO: 208 THOUSANDS/UL (ref 149–390)
PMV BLD AUTO: 9.2 FL (ref 8.9–12.7)
POTASSIUM SERPL-SCNC: 3.6 MMOL/L (ref 3.5–5.3)
PROT SERPL-MCNC: 6.4 G/DL (ref 6.4–8.4)
PROTHROMBIN TIME: 13.7 SECONDS (ref 11.6–14.5)
RBC # BLD AUTO: 3.52 MILLION/UL (ref 3.81–5.12)
SODIUM SERPL-SCNC: 139 MMOL/L (ref 135–147)
URATE SERPL-MCNC: 4.6 MG/DL (ref 2–7.5)
WBC # BLD AUTO: 4.9 THOUSAND/UL (ref 4.31–10.16)

## 2024-07-17 PROCEDURE — 85610 PROTHROMBIN TIME: CPT | Performed by: STUDENT IN AN ORGANIZED HEALTH CARE EDUCATION/TRAINING PROGRAM

## 2024-07-17 PROCEDURE — 84550 ASSAY OF BLOOD/URIC ACID: CPT

## 2024-07-17 PROCEDURE — 85025 COMPLETE CBC W/AUTO DIFF WBC: CPT | Performed by: STUDENT IN AN ORGANIZED HEALTH CARE EDUCATION/TRAINING PROGRAM

## 2024-07-17 PROCEDURE — 80053 COMPREHEN METABOLIC PANEL: CPT | Performed by: STUDENT IN AN ORGANIZED HEALTH CARE EDUCATION/TRAINING PROGRAM

## 2024-07-17 NOTE — PROGRESS NOTES
Patient to infusion center for lab work. Patient offers no complaints. Port accessed without incident with excellent blood return noted. Labs drawn as per order. Port flushed and de-accessed as per protocol. Next appointment confirmed for 8/14/24 at 0900. AVS offered and declined.

## 2024-07-18 ENCOUNTER — TELEPHONE (OUTPATIENT)
Dept: GASTROENTEROLOGY | Facility: CLINIC | Age: 59
End: 2024-07-18

## 2024-07-18 NOTE — TELEPHONE ENCOUNTER
Spoke with pt's emergency contact Mateo, reviewed results and recommendations with him. He was unable to confirm the dosage of prednisone and will send a MCM once he is able to confirm (he does believe she is on the 5 mg). He was agreeable to recommendations and verbalized understanding.

## 2024-07-18 NOTE — TELEPHONE ENCOUNTER
----- Message from Akua Machuca MD sent at 7/18/2024  9:26 AM EDT -----  Labs reviewed and showed normal liver tests. Can we please confirm what dose of prednisone that she's taking? If she's on 5 mg, I would continue and repeat labs next month. If still normal, we can discontinue it then. Thanks!

## 2024-07-20 DIAGNOSIS — E87.6 HYPOKALEMIA: ICD-10-CM

## 2024-07-20 RX ORDER — POTASSIUM CHLORIDE 750 MG/1
40 CAPSULE, EXTENDED RELEASE ORAL 2 TIMES DAILY
Qty: 240 CAPSULE | Refills: 5 | Status: SHIPPED | OUTPATIENT
Start: 2024-07-20 | End: 2024-09-18

## 2024-07-22 ENCOUNTER — TELEPHONE (OUTPATIENT)
Dept: UROLOGY | Facility: AMBULATORY SURGERY CENTER | Age: 59
End: 2024-07-22

## 2024-07-22 ENCOUNTER — PREP FOR PROCEDURE (OUTPATIENT)
Dept: UROLOGY | Facility: AMBULATORY SURGERY CENTER | Age: 59
End: 2024-07-22

## 2024-07-22 DIAGNOSIS — R39.89 SUSPECTED UTI: ICD-10-CM

## 2024-07-22 DIAGNOSIS — N20.1 URETERAL CALCULI: ICD-10-CM

## 2024-07-22 DIAGNOSIS — N13.5 URETERAL STRICTURE: Primary | ICD-10-CM

## 2024-07-22 NOTE — TELEPHONE ENCOUNTER
Spoke with patient and confirmed surgery date of: 9/13/2024  Type of surgery: Cysto/ Jonathan. RGP/ L. URS w/ laser/ Jonathan. Stent exchange  Operating physician: Dr. Stephens  Location of surgery: [facility]    Verbally went over prep with patient on: 7/22/2024  NPO  Bowel prep? No  Hospital calls afternoon prior with arrival time -Calls Friday afternoon for Monday surgeries  Patient needs ride to and from surgery outpatient  Pre-op testing to be done 2 weeks prior to surgery  Urine culture  Blood thinners: NONE  Clearances needed: NONE    Mailed to patient on:7/25/2024  Copy of packet scanned into Media on: 7/25/2024  Labs in packet  Soap / Bowel prep in packet  Pre-op & Post-op in packet  Dates of H&P and post-op if needed    Consent: in Media

## 2024-07-25 ENCOUNTER — OFFICE VISIT (OUTPATIENT)
Dept: PALLIATIVE MEDICINE | Facility: CLINIC | Age: 59
End: 2024-07-25

## 2024-07-25 VITALS
WEIGHT: 126.5 LBS | SYSTOLIC BLOOD PRESSURE: 100 MMHG | DIASTOLIC BLOOD PRESSURE: 52 MMHG | HEART RATE: 89 BPM | TEMPERATURE: 97.1 F | HEIGHT: 62 IN | BODY MASS INDEX: 23.28 KG/M2 | OXYGEN SATURATION: 96 %

## 2024-07-25 DIAGNOSIS — G89.3 CANCER RELATED PAIN: ICD-10-CM

## 2024-07-25 DIAGNOSIS — Z51.5 PALLIATIVE CARE PATIENT: ICD-10-CM

## 2024-07-25 DIAGNOSIS — C18.7 MALIGNANT NEOPLASM OF SIGMOID COLON (HCC): Primary | ICD-10-CM

## 2024-07-25 DIAGNOSIS — R11.0 NAUSEA: ICD-10-CM

## 2024-07-25 RX ORDER — ONDANSETRON 4 MG/1
4 TABLET, FILM COATED ORAL EVERY 8 HOURS PRN
Qty: 20 TABLET | Refills: 2 | Status: SHIPPED | OUTPATIENT
Start: 2024-07-25

## 2024-07-25 RX ORDER — OXYCODONE HYDROCHLORIDE 5 MG/1
5 TABLET ORAL EVERY 4 HOURS PRN
Qty: 120 TABLET | Refills: 0 | Status: SHIPPED | OUTPATIENT
Start: 2024-07-25

## 2024-07-25 NOTE — PROGRESS NOTES
Ambulatory Visit  Name: Danielle Haque      : 1965      MRN: [id number]  Encounter Provider: Koko Pillai MD  Encounter Date: 2024   Encounter department: [facility]    Assessment & Plan   1. Malignant neoplasm of sigmoid colon (HCC)  Assessment & Plan:  Patient is status post treatment, not currently receiving systemic therapy. She has some intermittent abdominal pain, particularly on the left side, but also notes some pain secondary to urinary issues. Recent CT scan on 24 was reassuring.  Orders:  -     oxyCODONE (Roxicodone) 5 immediate release tablet; Take 1 tablet (5 mg total) by mouth every 4 (four) hours as needed for moderate pain Max Daily Amount: 30 mg  -     ondansetron (ZOFRAN) 4 mg tablet; Take 1 tablet (4 mg total) by mouth every 8 (eight) hours as needed for nausea or vomiting  2. Cancer related pain  Assessment & Plan:  Patient endorses groin pain and  pain which is not necessarily secondary to malignancy, but is chronic and recurrent in nature.  Continue medications provided by Urology including oxybutynin and tamsulosin.  May use oxycodone PRN. She feels it is quite helpful for the severe pain she is experiencing.  Use Tylenol, up to 3000 mg per 24 hours.  Orders:  -     oxyCODONE (Roxicodone) 5 immediate release tablet; Take 1 tablet (5 mg total) by mouth every 4 (four) hours as needed for moderate pain Max Daily Amount: 30 mg  3. Nausea  Assessment & Plan:  Use Zofran PRN N/V; effective.  Orders:  -     ondansetron (ZOFRAN) 4 mg tablet; Take 1 tablet (4 mg total) by mouth every 8 (eight) hours as needed for nausea or vomiting  4. Palliative care patient  Assessment & Plan:  Sent message to patient's Urologist today, on her behalf, as she is asking for refill of oxybutynin and tamsulosin (which Dr. Stephens prescribes).  Emotional / psychosocial support provided today.  ACP: Patient has not completed any advanced healthcare directives. She was  provided a copy of the [facility] Advanced Directive along with counseling in a prior visit. She had expressed a desire to have her ex- Mateo be listed as a surrogate healthcare decision-maker. She has not yet been ready to completed advanced directive.  Reviewed notes (Urology, Radiation Oncology, Dermatology, Medical Oncology), labs (7/17/24 Cr 1.11, GFR 55, alb 3.9, Hb 10.0), imaging + procedures (6/24/24 CTCAP). See below for more data.  Return in about 2 months (around 9/25/2024).  Medication safety issues addressed - no driving under the influence of narcotics (including opioids), watch for adverse effects including AMS or respiratory depression (slowed breathing), keep medications stored in a safe/locked environment, do not use alcohol while opioids or other narcotics are in one's system, do not travel with more than the minimum number of tablets or capsules required for the trip.  I have personally queried the patient's controlled substance dispensing history in the Prescription Drug Monitoring Program in compliance with regulations before I have prescribed any controlled substances. The prescription history is consistent with prescribed therapy and our practice policies.  Orders:  -     oxyCODONE (Roxicodone) 5 immediate release tablet; Take 1 tablet (5 mg total) by mouth every 4 (four) hours as needed for moderate pain Max Daily Amount: 30 mg  -     ondansetron (ZOFRAN) 4 mg tablet; Take 1 tablet (4 mg total) by mouth every 8 (eight) hours as needed for nausea or vomiting      Subjective   Chief Complaint   Patient presents with    Follow-up    Medication Refill    Cancer    Pain    Counseling        History of Present Illness     Danielle Haque is a 58 y.o. female w/ rectosigmoid cancer s/p laparoscopic diverting colostomy in 12/2022 and s/p neoadjuvant chemoXRT, s/p surgery 8/17/23; chemo-induced diarrhea, biopsy-proven pulmonary sarcoidosis on steroids, hepatic sarcoidosis, nephrolithiasis w/ urinary  "obstruction s/p bilateral ureteral stenting.    Patient endorses 4/10 pain at time of visit, which she states is \"in my privates\". When asked, she states this is not a vaginal or genital pain, but rather a  pain which she feels is secondary to \"stones\" and other urinary issues. She states that taking oxycodone in conjunction with oxybutynin is helpful. Tylenol provides only modest relief. She states her pain is so severe at times that it can affect her appetite. She is losing weight because of this, but also because she has been tapering down her prednisone. Patient states she is also been experiencing right shoulder pain which radiates into her back.    Patient endorses some nausea at times, and notes that Zofran is effective. She denies constipation, but notes she had 1 episode of diarrhea with significant abdominal cramping, in the recent past.      Current Outpatient Medications:     acetaminophen (TYLENOL) 325 mg tablet, Take 2 tablets (650 mg total) by mouth every 4 (four) hours as needed for mild pain, Disp: , Rfl:     acetaminophen (TYLENOL) 500 mg tablet, Take 2 tablets (1,000 mg total) by mouth 3 (three) times a day as needed for mild pain, moderate pain, headaches or fever, Disp: 180 tablet, Rfl: 11    amLODIPine (NORVASC) 10 mg tablet, TAKE 1 TABLET BY MOUTH EVERY DAY, Disp: 90 tablet, Rfl: 1    cholecalciferol 400 units tablet, Take 400 Units by mouth daily, Disp: , Rfl:     cyanocobalamin (VITAMIN B-12) 1000 MCG tablet, Take 1,000 mcg by mouth daily, Disp: , Rfl:     estrogens, conjugated (Premarin) vaginal cream, Insert 0.3 g into the vagina 3 (three) times a week, Disp: 30 g, Rfl: 6    fenofibrate (TRICOR) 145 mg tablet, Take 1 tablet (145 mg total) by mouth daily, Disp: 90 tablet, Rfl: 3    ferrous sulfate 324 (65 Fe) mg, Take 1 tablet (324 mg total) by mouth daily before breakfast, Disp: 30 tablet, Rfl: 0    ondansetron (ZOFRAN) 4 mg tablet, Take 1 tablet (4 mg total) by mouth every 8 (eight) " "hours as needed for nausea or vomiting, Disp: 20 tablet, Rfl: 2    oxybutynin (DITROPAN) 5 mg tablet, Take 1 tablet (5 mg total) by mouth every 6 (six) hours as needed (bladder spasms), Disp: 30 tablet, Rfl: 0    oxyCODONE (Roxicodone) 5 immediate release tablet, Take 1 tablet (5 mg total) by mouth every 4 (four) hours as needed for moderate pain Max Daily Amount: 30 mg, Disp: 120 tablet, Rfl: 0    potassium chloride (MICRO-K) 10 MEQ CR capsule, TAKE 4 CAPSULES (40 MEQ TOTAL) BY MOUTH 2 (TWO) TIMES A DAY, Disp: 240 capsule, Rfl: 5    predniSONE 5 mg tablet, Take 5 mg by mouth daily, Disp: , Rfl:     ursodiol (ACTIGALL) 300 mg capsule, TAKE 3 CAPSULES (900 MG TOTAL) BY MOUTH IN THE MORNING, Disp: 90 capsule, Rfl: 1    docusate sodium (COLACE) 100 mg capsule, Take 1 capsule (100 mg total) by mouth 2 (two) times a day for 15 days, Disp: 30 capsule, Rfl: 0    naproxen (NAPROSYN) 500 mg tablet, Take 1 tablet (500 mg total) by mouth 2 (two) times a day with meals for 5 days For pain, Disp: 10 tablet, Rfl: 0    oxybutynin (DITROPAN) 5 mg tablet, TAKE 1 TABLET (5 MG TOTAL) BY MOUTH EVERY 6 (SIX) HOURS AS NEEDED (BLADDER SPASMS) (Patient not taking: Reported on 7/25/2024), Disp: 30 tablet, Rfl: 5    phenazopyridine (PYRIDIUM) 200 mg tablet, Take 1 tablet (200 mg total) by mouth 3 (three) times a day as needed for bladder spasms (Patient not taking: Reported on 7/25/2024), Disp: 30 tablet, Rfl: 0    tamsulosin (FLOMAX) 0.4 mg, Take 1 capsule (0.4 mg total) by mouth daily with dinner (Patient not taking: Reported on 7/25/2024), Disp: 15 capsule, Rfl: 0    Objective   /52 (BP Location: Left arm, Patient Position: Sitting, Cuff Size: Standard)   Pulse 89   Temp (!) 97.1 °F (36.2 °C) (Temporal)   Ht 5' 2\" (1.575 m)   Wt 57.4 kg (126 lb 8 oz)   SpO2 96%   BMI 23.14 kg/m²   Physical Exam  Vitals reviewed.   Constitutional:       General: She is not in acute distress.     Appearance: She is well-groomed and normal " weight. She is not toxic-appearing.   HENT:      Head: Normocephalic and atraumatic.      Right Ear: External ear normal.      Left Ear: External ear normal.   Eyes:      General: No scleral icterus.        Right eye: No discharge.         Left eye: No discharge.      Extraocular Movements: Extraocular movements intact.      Conjunctiva/sclera: Conjunctivae normal.      Pupils: Pupils are equal, round, and reactive to light.   Cardiovascular:      Rate and Rhythm: Normal rate.   Pulmonary:      Effort: Pulmonary effort is normal. No tachypnea, bradypnea, accessory muscle usage or respiratory distress.      Comments: Able to speak comfortably in complete sentences on room air at rest.  Abdominal:      General: There is no distension.      Tenderness: There is no guarding.   Musculoskeletal:      Cervical back: Normal range of motion.      Right lower leg: No edema.      Left lower leg: No edema.   Skin:     General: Skin is dry.      Coloration: Skin is not pale.   Neurological:      Mental Status: She is alert and oriented to person, place, and time.      Cranial Nerves: No dysarthria or facial asymmetry.      Comments: Using no assistive devices for ambulation.   Psychiatric:         Attention and Perception: Attention normal.         Mood and Affect: Mood and affect normal.         Speech: Speech normal.         Behavior: Behavior normal. Behavior is cooperative.         Thought Content: Thought content normal.         Cognition and Memory: Cognition and memory normal.         Judgment: Judgment normal.          Recent labs:  Lab Results   Component Value Date/Time    SODIUM 139 07/17/2024 10:42 AM    K 3.6 07/17/2024 10:42 AM    BUN 23 07/17/2024 10:42 AM    CREATININE 1.11 07/17/2024 10:42 AM    GLUC 88 07/17/2024 10:42 AM    CALCIUM 9.7 07/17/2024 10:42 AM    AST 26 07/17/2024 10:42 AM    ALT 29 07/17/2024 10:42 AM    ALB 3.9 07/17/2024 10:42 AM    TP 6.4 07/17/2024 10:42 AM    EGFR 55 07/17/2024 10:42 AM      Lab Results   Component Value Date/Time    HGB 10.0 (L) 07/17/2024 10:42 AM    WBC 4.90 07/17/2024 10:42 AM     07/17/2024 10:42 AM    INR 0.99 07/17/2024 10:42 AM    PTT 27 03/02/2024 03:57 AM     Lab Results   Component Value Date/Time    [id number] 0.253 (L) 07/11/2023 08:34 AM       Recent Imaging:  Procedure: CT chest abdomen pelvis w contrast    Result Date: 6/27/2024  Narrative: CT CHEST, ABDOMEN AND PELVIS WITH IV CONTRAST INDICATION: R91.8: Other nonspecific abnormal finding of lung field C20: Malignant neoplasm of rectum. COMPARISON: Comparison is made to prior studies, the most recent CT scan is dated March 11, 2024. TECHNIQUE: CT examination of the chest, abdomen and pelvis was performed. Multiplanar 2D reformatted images were created from the source data. This examination, like all CT scans performed in the [facility], was performed utilizing techniques to minimize radiation dose exposure, including the use of iterative reconstruction and automated exposure control. Radiation dose length product (DLP) for this visit: 541.65 mGy-cm IV Contrast: 85 mL of iohexol (OMNIPAQUE) Enteric Contrast: Not administered. FINDINGS: CHEST LUNGS: Bilateral pulmonary nodules, some which are cavitary, are unchanged. Index nodule at are as follows: 1.4 x 1 cm nodule in the lingula (4, 143) previously measured 1.5 x 0.9 cm. Index left lower lobe nodule measures 0.7 cm unchanged (series 4 image 169). 0.7 cm nodule centrally in the right lower lobe (4, 177) is unchanged. . Nodularity along the fissures also unchanged compatible with known sarcoidosis. PLEURA: Unremarkable. HEART/GREAT VESSELS: Heart is unremarkable for patient's age. No thoracic aortic aneurysm. MEDIASTINUM AND DUSTY: Unremarkable. CHEST WALL AND LOWER NECK: Mediport catheter with tip at the cavoatrial junction. Stable size of 1.7 cm nodule in the right lobe of the thyroid gland. ABDOMEN LIVER/BILIARY TREE: Unremarkable.  GALLBLADDER: No calcified gallstones. No pericholecystic inflammatory change. SPLEEN: Multiple hypodense nodules throughout the spleen are unchanged compatible with known sarcoidosis. 1 cm nodule right adrenal gland is unchanged. Left adrenal gland is normal. PANCREAS: Unremarkable. ADRENAL GLANDS: Unremarkable. KIDNEYS/URETERS: Bilateral ureteral stents are in appropriate position, with slight increase in pelvicaliectasis on the right and slight decrease on the left. Bilateral nonobstructing calculi are unchanged. Focal area of encrustation about the proximal right stent is also unchanged. STOMACH AND BOWEL: Right upper quadrant diverting loop ileostomy. Rectal anastomosis is intact. No evidence of locally recurrent tumor. No bowel obstruction. APPENDIX: No findings to suggest appendicitis. ABDOMINOPELVIC CAVITY: No ascites. No pneumoperitoneum. No lymphadenopathy. VESSELS: Unremarkable for patient's age. PELVIS REPRODUCTIVE ORGANS: Unremarkable for patient's age. URINARY BLADDER: Collapsed and not well visualized but grossly unremarkable. ABDOMINAL WALL/INGUINAL REGIONS: Unremarkable. BONES: No acute fracture or suspicious osseous lesion.     Impression: Stable bilateral pulmonary nodules, some which are cavitary, are unchanged. Multiple low-density lesions throughout the spleen likely due to sarcoid, also unchanged. Bilateral hydronephrosis, decreased on the left and slightly increased on the right. Bilateral nonobstructing calculi unchanged. No new findings in the chest abdomen or pelvis. Workstation performed: [id number]     Procedure: FL retrograde pyelogram    Result Date: 5/16/2024  Narrative: BILATERAL RETROGRADE PYELOGRAM INDICATION: Ureteral stricture, right. COMPARISON: 2/8/2024 IMAGES: 11 FLUOROSCOPY TIME: 67.9 seconds CONTRAST: 12 mL of iohexol (OMNIPAQUE) FINDINGS: Retrograde opacification of the left ureter mild left hydronephrosis without obstruction. Left double-J ureteral stent. Retrograde  opacification of the right ureter demonstrates prominent right hydronephrosis without distal obstruction. Right double-J ureteral stent noted. Osseous and soft tissue detail limited by technique.     Impression: Fluoroscopic guidance provided for bilateral retrograde pyelogram. Please see separate procedure report for additional details. Workstation performed: [id number]     Procedure: US head neck soft tissue    Result Date: 4/30/2024  Narrative: HEAD NECK SOFT TISSUE ULTRASOUND INDICATION: R22.9: Localized swelling, mass and lump, unspecified. Left posterior auricular palpable abnormality. Described as tender, irritated COMPARISON: None TECHNIQUE: Real-time ultrasound of the area of concern was performed with a linear transducer with both volumetric sweeps and still imaging techniques. FINDINGS: Corresponding to the palpable abnormality, there is a hypoechoic subcutaneous nodule measuring 0.8 x 0.8 x 0.3 cm, approximately 0.1 cm below the skin surface. Although very subtle, there is suggestion of a thin linear connection to the skin surface (see  for example sweep series 300, images 59 through 63)     Impression: Palpable abnormality corresponds to a 0.8 cm subcutaneous hypoechoic nodule. Probable thin linear connection to the skin surface. Overall appearance most suggestive of an epidermoid inclusion cyst. Periodic physical examination reassessment recommended to assure there is no interval change in size or character that would warrant further evaluation Workstation performed: [id number]      30 minutes total time spent on 7/25/2024 in caring for this patient including obtaining/reviewing history, symptom assessment and management, medication review / adjustment, psychosocial support, reviewing / ordering tests, medicines, imaging, procedures, supportive listening, anticipatory guidance, and documenting in the medical record. All patient/family questions were answered during this discussion.    Koko Pillai,  "MD [facility]  [phone]    Portions of this document may have been created using dictation software and as such some \"sound alike\" terms may have been generated by the system. Do not hesitate to contact me with any questions or clarifications.   "

## 2024-07-25 NOTE — ASSESSMENT & PLAN NOTE
Patient endorses groin pain and  pain which is not necessarily secondary to malignancy, but is chronic and recurrent in nature.  Continue medications provided by Urology including oxybutynin and tamsulosin.  May use oxycodone PRN. She feels it is quite helpful for the severe pain she is experiencing.  Use Tylenol, up to 3000 mg per 24 hours.

## 2024-07-25 NOTE — ASSESSMENT & PLAN NOTE
Patient is status post treatment, not currently receiving systemic therapy. She has some intermittent abdominal pain, particularly on the left side, but also notes some pain secondary to urinary issues. Recent CT scan on 6/24/24 was reassuring.

## 2024-07-25 NOTE — ASSESSMENT & PLAN NOTE
Sent message to patient's Urologist today, on her behalf, as she is asking for refill of oxybutynin and tamsulosin (which Dr. Stephens prescribes).  Emotional / psychosocial support provided today.  ACP: Patient has not completed any advanced healthcare directives. She was provided a copy of the [facility] Advanced Directive along with counseling in a prior visit. She had expressed a desire to have her ex- Mateo be listed as a surrogate healthcare decision-maker. She has not yet been ready to completed advanced directive.  Reviewed notes (Urology, Radiation Oncology, Dermatology, Medical Oncology), labs (7/17/24 Cr 1.11, GFR 55, alb 3.9, Hb 10.0), imaging + procedures (6/24/24 CTCAP). See below for more data.  Return in about 2 months (around 9/25/2024).  Medication safety issues addressed - no driving under the influence of narcotics (including opioids), watch for adverse effects including AMS or respiratory depression (slowed breathing), keep medications stored in a safe/locked environment, do not use alcohol while opioids or other narcotics are in one's system, do not travel with more than the minimum number of tablets or capsules required for the trip.  I have personally queried the patient's controlled substance dispensing history in the Prescription Drug Monitoring Program in compliance with regulations before I have prescribed any controlled substances. The prescription history is consistent with prescribed therapy and our practice policies.

## 2024-07-25 NOTE — PATIENT INSTRUCTIONS
It was good to see you today. Thank you for coming in.    Refilling oxycodone - use as directed.  I have sent a message to Dr Stephens of Urology, about refill request for oxybutynin and Flomax.  Use Zofran as needed for nausea.  Return in about 2 months (around 9/25/2024).  Call us for refills on medications that we supply, as needed.   If something changes and you need to come in sooner, please call our office.    PRESCRIPTION REFILL REMINDER:  All medication refills should be requested prior to Noon on Friday. Any refill requests after noon on Friday would be addressed the following Monday.    MEDICATION SAFETY ISSUES:   Do not drive under the influence of narcotics (including opioids), watch for adverse effects including confusion / altered mental status / respiratory depression (slowed breathing), keep medications stored in a safe/locked environment, do not use alcohol while opioids or other narcotics are in your system. Do not travel with more than the minimum number of tablets or capsules required for the trip.

## 2024-07-29 NOTE — PROGRESS NOTES
Colon and Rectal Surgery   Danielle Haque 58 y.o. female MRN: [id number]   Encounter: [id number]  24   10:48 AM        Ambulatory Visit  Name: Danielle Haque      : 1965      MRN: [id number]  Encounter Provider: Sree Umanzor MD  Encounter Date: 2024   Encounter department: [facility] COLON AND RECTAL SURGERY [place]    Assessment & Plan   1. Rectal cancer (HCC)  Assessment & Plan:  Danielle presents today, history rectal cancer, nearing 1 year,from robotic low anterior resection, low pelvic anastomosis, colorectal anastomosis, stapled, Left-sided loop colostomy reversal, Diverting Loop ileostomy on 2023, we had prior discussed ypT2N0 result, negative margins, excellent outcome.     Follow-up sigmoidoscopy 2023 with healthy anastomosis, we had discussed ileostomy reversal at that time however she has had multiple interval procedures for ureteral strictures, she may potentially need right-sided ureteral reconstruction with Dr. Stephens.    She is awaiting labs in the upcoming weeks to decide whether or not she can come off of prednisone per hepatology.    I have messaged Dr. Stephens as well regarding timing of surgery before his potentially, we have tentatively selected 2024 for ileostomy reversal, will perform flexible sigmoidoscopy immediately prior to reversal given the time since last check of the anastomosis.    We discussed ileostomy reversal,in a face-to-face, personal, informed consent process, the benefits, alternatives, risks including not limited to bleeding, infection, risks of anesthesia, open surgery, DVT/PE, heart attack, stroke, death, damage to local structures(e.g. ureter, duodenum),anastomotic leak requiring reoperation, temporary versus permanent stoma. They understood these risks, signed informed consent, and wish to proceed.    CC:  Dr. Héctor Ferrera  Orders:  -     Fingerstick Glucose  (POCT); Standing  -     Nursing Communication Warming Interventions; Standing  -     Nursing Communication Chlorhexidine Cloths; Standing  -     Void on call to OR; Standing  -     Insert peripheral IV; Standing  -     Shower; Standing  -     Place sequential compression device; Standing  -     Case request operating room: SIGMOIDOSCOPY FLEXIBLE, CLOSURE ILEOSTOMY; Standing  -     HEMOGLOBIN A1C W/ EAG ESTIMATION; Future  -     Type and screen; Future  -     Nursing communication Please give pre-op Carbohydrate drink to patient 2-4 hours prior to surgery (Drink is provided [facility]); Standing  -     heparin (porcine) subcutaneous injection 5,000 Units  -     neomycin (MYCIFRADIN) 500 mg tablet; 1pm and 10pm Do not start before September 2, 2024.  -     metroNIDAZOLE (FLAGYL) 500 mg tablet; 1 PM AND 10PM Do not start before September 2, 2024.  -     ceFAZolin (ANCEF) 2,000 mg in dextrose 5 % 100 mL IVPB  -     Case request operating room: SIGMOIDOSCOPY FLEXIBLE, CLOSURE ILEOSTOMY      HPI  Danielle Haque is a 58 y.o. female who presents for a follow up for ypT2N0 rectal cancer. The patient was last seen in the office on 9/26/23.    The patient is status post robotic low anterior resection, low pelvic anastomosis, colorectal anastomosis, stapled, Left-sided loop colostomy reversal, Diverting Loop ileostomy on 8/17/2023.     CT chest abdomen and pelvis with contrast 6/24/24   IMPRESSION:  Stable bilateral pulmonary nodules, some which are cavitary, are unchanged.  Multiple low-density lesions throughout the spleen likely due to sarcoid, also unchanged.  Bilateral hydronephrosis, decreased on the left and slightly increased on the right.  Bilateral nonobstructing calculi unchanged.  No new findings in the chest abdomen or pelvis.    The patient states that she has intermittent rectal pain. She hasn't had any pain for the past few months.    She has daily ostomy output.     Around two weeks ago she  experienced abdominal pain. She states that she had watery stool for a few days after that. This has resolved.     Lab Results   Component Value Date    CEA 4.4 (H) 05/30/2024     Lab Results   Component Value Date    CRP 7.4 (H) 05/30/2024     Lab Results   Component Value Date    WBC 4.90 07/17/2024    HGB 10.0 (L) 07/17/2024    HCT 31.1 (L) 07/17/2024    MCV 88 07/17/2024     07/17/2024     Lab Results   Component Value Date    SODIUM 139 07/17/2024    K 3.6 07/17/2024     07/17/2024    CO2 26 07/17/2024    AGAP 5 07/17/2024    BUN 23 07/17/2024    CREATININE 1.11 07/17/2024    GLUC 88 07/17/2024    GLUF 88 07/17/2024    CALCIUM 9.7 07/17/2024    AST 26 07/17/2024    ALT 29 07/17/2024    ALKPHOS 94 07/17/2024    TP 6.4 07/17/2024    TBILI 0.64 07/17/2024    EGFR 55 07/17/2024     Historical Information   Past Medical History:   Diagnosis Date    Bleeding from colostomy stoma (HCC)      states ileostomy , not colostomy    Cancer (HCC)     Colon cancer (HCC)     Fall     Headache     Hemochromatosis, unspecified     History of transfusion     2022 - no adverse reaction    Hypertension     Kidney calculi     Kidney stone     Liver disease     hemangioma    Muscle weakness     Personal history of COVID-19 12/2022    Sarcoidosis     Seizures (HCC)     2022    Shingles      Past Surgical History:   Procedure Laterality Date    ABSCESS DRAINAGE      left breast    BREAST BIOPSY      CHEST TUBE INSERTION      COLON SURGERY      colostomy    COLONOSCOPY      FL GUIDED CENTRAL VENOUS ACCESS DEVICE INSERTION  03/21/2023    FL RETROGRADE PYELOGRAM  01/23/2023    FL RETROGRADE PYELOGRAM  04/03/2023    FL RETROGRADE PYELOGRAM  7/21/2023    FL RETROGRADE PYELOGRAM  10/11/2023    FL RETROGRADE PYELOGRAM  12/15/2023    FL RETROGRADE PYELOGRAM  2/8/2024    FL RETROGRADE PYELOGRAM  5/10/2024    IR BIOPSY LIVER MASS  05/09/2023    LUNG BIOPSY      NJ CYSTO BLADDER W/URETERAL CATHETERIZATION Bilateral 07/21/2023     Procedure: CYSTOSCOPY URETEROSCOPY RETROGRADE PYELOGRAM WITH BILATERAL STENT URETERAL EXCHANGE; LEFT LASER LITHOTRIPSY AND STONE BASKET RETRIEVAL;  Surgeon: José Luis Stephens MD;  [facility];  Service: Urology    OR CYSTO BLADDER W/URETERAL CATHETERIZATION Bilateral 12/15/2023    Procedure: CYSTOSCOPY, BILATERAL  RETROGRADE PYELOGRAM , STENT EXCHANGE RIGHT , LEFT URETEROSCOPY ,  HOLMIUM LASER WITH INSERTION LEFT URETERAL STENT;  Surgeon: Derick Bhakta MD;  [facility];  Service: Urology    OR CYSTO BLADDER W/URETERAL CATHETERIZATION Left 2/8/2024    Procedure: CYSTOSCOPY B/L RETROGRADE PYELOGRAM WITH B/L T URETERALSTENT EXCHANGE,LEFT URETEROSCOPY, DILATION LEFT URETERAL STICTURE;  Surgeon: José Luis Stephens MD;  [facility];  Service: Urology    OR CYSTO BLADDER W/URETERAL CATHETERIZATION Bilateral 5/10/2024    Procedure: CYSTOSCOPY RETROGRADE PYELOGRAM WITH INSERTION STENT URETERAL;  Surgeon: José Luis Stephens MD;  [facility];  Service: Urology    OR CYSTO W/INSERT URETERAL STENT Bilateral 5/10/2024    Procedure: EXCHANGE STENT URETERAL;  Surgeon: José Luis Stephens MD;  [facility];  Service: Urology    OR CYSTO/URETERO W/LITHOTRIPSY &INDWELL STENT INSRT Bilateral 01/23/2023    Procedure: CYSTOSCOPY  RIGHT URETEROSCOPY WITH LITHOTRIPSY HOLMIUM LASER, BILATERAL RETROGRADE PYELOGRAM AND BILATERAL  URETERAL STENT INSERTION;  Surgeon: José Luis Stephens MD;  [facility];  Service: Urology    OR CYSTO/URETERO W/LITHOTRIPSY &INDWELL STENT INSRT Right 04/03/2023    Procedure: RIGHT URETEROSCOPY WITH LITHOTRIPSY HOLMIUM LASER, RETROGRADE PYELOGRAM; BILATERAL EXCHANGE STENT URETERAL;  Surgeon: José Luis Stephens MD;  [facility];  Service: Urology    OR CYSTO/URETERO W/LITHOTRIPSY &INDWELL STENT INSRT Bilateral 10/11/2023    Procedure: CYSTOSCOPY URETEROSCOPY RETROGRADE PYELOGRAM AND INSERTION STENT URETERAL DIAGNOSTINC  RIGHT URETEROSCOPY, REMOVAL STENT LEFT SIDE;  Surgeon: José Luis Stephens MD;  [facility];  Service: Urology    CA INSJ TUNNELED CTR VAD W/SUBQ PORT AGE 5 YR/> N/A 03/21/2023    Procedure: INSERTION VENOUS PORT ( PORT-A-CATH) IR;  Surgeon: Mark Amos DO;  [facility];  Service: Interventional Radiology    CA LAPS COLECTOMY PRTL W/COLOPXTSTMY LW ANAST N/A 8/17/2023    Procedure: RESECTION COLON LOW ANTERIOR LAPAROSCOPIC WITH ROBOTICS;  Surgeon: Sree Umanzor MD;  [facility];  Service: Colorectal    TONSILLECTOMY      URINARY SURGERY Bilateral     bilateral stents       Meds/Allergies       Current Outpatient Medications:     acetaminophen (TYLENOL) 325 mg tablet, Take 2 tablets (650 mg total) by mouth every 4 (four) hours as needed for mild pain, Disp: , Rfl:     acetaminophen (TYLENOL) 500 mg tablet, Take 2 tablets (1,000 mg total) by mouth 3 (three) times a day as needed for mild pain, moderate pain, headaches or fever, Disp: 180 tablet, Rfl: 11    amLODIPine (NORVASC) 10 mg tablet, TAKE 1 TABLET BY MOUTH EVERY DAY, Disp: 90 tablet, Rfl: 1    cholecalciferol 400 units tablet, Take 400 Units by mouth daily, Disp: , Rfl:     cyanocobalamin (VITAMIN B-12) 1000 MCG tablet, Take 1,000 mcg by mouth daily, Disp: , Rfl:     estrogens, conjugated (Premarin) vaginal cream, Insert 0.3 g into the vagina 3 (three) times a week, Disp: 30 g, Rfl: 6    fenofibrate (TRICOR) 145 mg tablet, Take 1 tablet (145 mg total) by mouth daily, Disp: 90 tablet, Rfl: 3    ferrous sulfate 324 (65 Fe) mg, Take 1 tablet (324 mg total) by mouth daily before breakfast, Disp: 30 tablet, Rfl: 0    [START ON 9/2/2024] metroNIDAZOLE (FLAGYL) 500 mg tablet, 1 PM AND 10PM Do not start before September 2, 2024., Disp: 2 tablet, Rfl: 0    [START ON 9/2/2024] neomycin (MYCIFRADIN) 500 mg tablet, 1pm and 10pm Do not start before September 2, 2024., Disp: 2 tablet, Rfl: 0    ondansetron (ZOFRAN) 4 mg tablet,  Take 1 tablet (4 mg total) by mouth every 8 (eight) hours as needed for nausea or vomiting, Disp: 20 tablet, Rfl: 2    oxybutynin (DITROPAN) 5 mg tablet, TAKE 1 TABLET (5 MG TOTAL) BY MOUTH EVERY 6 (SIX) HOURS AS NEEDED (BLADDER SPASMS), Disp: 30 tablet, Rfl: 5    oxyCODONE (Roxicodone) 5 immediate release tablet, Take 1 tablet (5 mg total) by mouth every 4 (four) hours as needed for moderate pain Max Daily Amount: 30 mg, Disp: 120 tablet, Rfl: 0    potassium chloride (MICRO-K) 10 MEQ CR capsule, TAKE 4 CAPSULES (40 MEQ TOTAL) BY MOUTH 2 (TWO) TIMES A DAY, Disp: 240 capsule, Rfl: 5    predniSONE 5 mg tablet, Take 5 mg by mouth daily, Disp: , Rfl:     ursodiol (ACTIGALL) 300 mg capsule, TAKE 3 CAPSULES (900 MG TOTAL) BY MOUTH IN THE MORNING, Disp: 90 capsule, Rfl: 1    docusate sodium (COLACE) 100 mg capsule, Take 1 capsule (100 mg total) by mouth 2 (two) times a day for 15 days, Disp: 30 capsule, Rfl: 0    naproxen (NAPROSYN) 500 mg tablet, Take 1 tablet (500 mg total) by mouth 2 (two) times a day with meals for 5 days For pain, Disp: 10 tablet, Rfl: 0    oxybutynin (DITROPAN) 5 mg tablet, Take 1 tablet (5 mg total) by mouth every 6 (six) hours as needed (bladder spasms) (Patient not taking: Reported on 7/30/2024), Disp: 30 tablet, Rfl: 0    phenazopyridine (PYRIDIUM) 200 mg tablet, Take 1 tablet (200 mg total) by mouth 3 (three) times a day as needed for bladder spasms (Patient not taking: Reported on 7/25/2024), Disp: 30 tablet, Rfl: 0    tamsulosin (FLOMAX) 0.4 mg, Take 1 capsule (0.4 mg total) by mouth daily with dinner (Patient not taking: Reported on 7/25/2024), Disp: 15 capsule, Rfl: 0      Allergies   Allergen Reactions    Oxaliplatin Shortness Of Breath     Reactions occurred with 2nd and 3rd infusions (about 1-3 hours from initiation of infusion) and required treatment with steroids and antihistamines. Please refer to allergy note on 2/7/2023 for detailed description of her reactions.    Morphine Nausea  "Only     \"Every dose made me nauseous\" (oral dosing only, can tolerate IV morphine)    Potassium Chloride Other (See Comments)     Pt reports \"burning\" with Iv potassium administration and wishes for it to be added to chart         Social History   Social History     Substance and Sexual Activity   Alcohol Use Never     Social History     Substance and Sexual Activity   Drug Use Never     Social History     Tobacco Use   Smoking Status Never    Passive exposure: Past   Smokeless Tobacco Never         Family History:   Family History   Problem Relation Age of Onset    Cancer Mother     Cirrhosis Father     Breast cancer Neg Hx     Breast cancer additional onset Neg Hx        Review of Systems   Constitutional: Negative.    HENT: Negative.     Eyes: Negative.    Respiratory: Negative.     Cardiovascular: Negative.    Gastrointestinal:  Positive for abdominal pain.   Endocrine: Negative.    Genitourinary: Negative.    Musculoskeletal: Negative.    Skin: Negative.    Allergic/Immunologic: Negative.    Neurological: Negative.    Hematological: Negative.    Psychiatric/Behavioral: Negative.         Objective   Current Vitals:   Vitals:    07/30/24 1015   BP: 108/60   Weight: 57.2 kg (126 lb)   Height: 5' 2\" (1.575 m)     Physical Exam:  General:no distress  Neck:supple  Pulm:no increased work of breathing, clear bilateral  CV:sinus  Abdomen:soft,nontender, stoma soft/well pouched  Extremities:no edema        "

## 2024-07-30 ENCOUNTER — TELEPHONE (OUTPATIENT)
Age: 59
End: 2024-07-30

## 2024-07-30 ENCOUNTER — OFFICE VISIT (OUTPATIENT)
Age: 59
End: 2024-07-30
Payer: COMMERCIAL

## 2024-07-30 VITALS
DIASTOLIC BLOOD PRESSURE: 60 MMHG | BODY MASS INDEX: 23.19 KG/M2 | WEIGHT: 126 LBS | HEIGHT: 62 IN | SYSTOLIC BLOOD PRESSURE: 108 MMHG

## 2024-07-30 DIAGNOSIS — C20 RECTAL CANCER (HCC): Primary | ICD-10-CM

## 2024-07-30 PROCEDURE — 99215 OFFICE O/P EST HI 40 MIN: CPT | Performed by: COLON & RECTAL SURGERY

## 2024-07-30 RX ORDER — METRONIDAZOLE 500 MG/1
TABLET ORAL
Qty: 2 TABLET | Refills: 0 | Status: SHIPPED | OUTPATIENT
Start: 2024-09-02 | End: 2024-09-03

## 2024-07-30 RX ORDER — HEPARIN SODIUM 5000 [USP'U]/ML
5000 INJECTION, SOLUTION INTRAVENOUS; SUBCUTANEOUS ONCE
OUTPATIENT
Start: 2024-07-30 | End: 2024-07-30

## 2024-07-30 RX ORDER — NEOMYCIN SULFATE 500 MG/1
TABLET ORAL
Qty: 2 TABLET | Refills: 0 | Status: SHIPPED | OUTPATIENT
Start: 2024-09-02 | End: 2024-09-03

## 2024-07-30 NOTE — TELEPHONE ENCOUNTER
Patient scheduled for surgery  9/26/24  at [facility]. Instructions and PAT's gone over with and handed to the patient.        Bloodwork   Abx  Surgical bag     Post op scheduled

## 2024-07-30 NOTE — ASSESSMENT & PLAN NOTE
Danielle presents today, history rectal cancer, nearing 1 year,from robotic low anterior resection, low pelvic anastomosis, colorectal anastomosis, stapled, Left-sided loop colostomy reversal, Diverting Loop ileostomy on 8/17/2023, we had prior discussed ypT2N0 result, negative margins, excellent outcome.     Follow-up sigmoidoscopy 11/2023 with healthy anastomosis, we had discussed ileostomy reversal at that time however she has had multiple interval procedures for ureteral strictures, she may potentially need right-sided ureteral reconstruction with Dr. Stephens.    She is awaiting labs in the upcoming weeks to decide whether or not she can come off of prednisone per hepatology.    I have messaged Dr. Stephens as well regarding timing of surgery before his potentially, we have tentatively selected 9/26/2024 for ileostomy reversal, will perform flexible sigmoidoscopy immediately prior to reversal given the time since last check of the anastomosis.    We discussed ileostomy reversal,in a face-to-face, personal, informed consent process, the benefits, alternatives, risks including not limited to bleeding, infection, risks of anesthesia, open surgery, DVT/PE, heart attack, stroke, death, damage to local structures(e.g. ureter, duodenum),anastomotic leak requiring reoperation, temporary versus permanent stoma. They understood these risks, signed informed consent, and wish to proceed.    CC:  Dr. Héctor Ferrera

## 2024-07-30 NOTE — LETTER
Danielle Haque  [address] [facility] [address]        7/30/2024    Dear Danielle Haque,    Your surgery is scheduled for: September 26, 2024    The hospital will call the evening prior to your surgery with your expected arrival time.  Location:Counts include 234 beds at the [facility] [address] [facility] [address] [facility] [address]    CHECK LIST PRIOR TO OSTOMY INPATIENT SURGERY   It is your responsibility to obtain any/all referrals needed for your surgery if required by your insurance. Our office will contact you to discuss your insurance coverage for this procedure.    Special instructions required if you are taking any blood thinners. Please verify with the prescribing physician. Examples include Coumadin, Plavix, Xarelto, Eliquis, Pradaxa, etc.    Please check with your family physician if you are taking the following medications: Aspirin or any Aspirin containing medication, Gingko biloba, Ginseng, Feverfew, and/or Bolt’s Wort. We suggest stopping these for 3 days.     The night before and the day of your surgery, wash from your neck to groin with chlorhexidine soap.  This soap is available at most retail pharmacies under such brand names as Hibiclens, Endure or Aplicare.    Pre-admission testing Required: YES X     If Yes, what type:  BLOOD-WORK  X     Clear liquids 24 hours prior to surgery.  CLEAR LIQUIDS INCLUDE:  Water, Clear Fruit Juice (Apple, Cranberry, Grape), Black Coffee, Tea, Ginger  Ale, Gatorade, Reed-Aid, Hi-C, plain Jello, Ice Pops, Clear Broth or Bouillon, Crystal Light, Lemonade, Soda (regular or diet). No Milk Products!     At 1:00 pm, on the day prior to surgery, take your first dose of antibiotic as prescribed.  At 10:00 pm, take your last dose of antibiotic as prescribed.    NOTHING TO EAT OR DRINK AFTER MIDNIGHT PRIOR TO SURGERY.     Please do not hesitate to call our office with any questions regarding your surgery.                  Post-Operative Care Information  Abdominal Surgery  What are  my post-operative instructions?   The following information and instructions are for your continued care after discharge from the hospital. Please read the information (including the After Visit Summary provided to you at the time of discharge) carefully. If you have any questions or concerns, please contact the clinical staff at [phone]    How do I take care of my incision?  Your incision(s) may have surgical glue, dissolvable sutures with white pieces of tape called steri strips, or staples.   If you have steri strips or surgical glue, do not remove them. Steri strips will fall off naturally in 7-10 days. Surgical glue (Exofin) will fall off over a period of up to 2-3 weeks.   Do not put any topical ointments or lotions on the incisions.   Avoid tight clothing around your incision(s) or fabric that may irritate your skin such as wool, hooks and dania.     Should I continue taking my prescribed medications?   Take medications as prescribed. Please review the After Visit Summary provided to you at the time of discharge for details.     How will I manage my pain at home?  For best pain control take your pain medication at regular intervals for the first couple of days, about every 4-6 hours. This will prevent pain build-up, which occurs when medication is taken on an as needed basis.   Use only as much prescription pain medication as you need (i.e. 1 tablet instead of 2).  Taper off the prescription pain medication as pain decreases, stopping narcotics first.   Use over-the-counter acetaminophen (Tylenolâ) if your doctor allows. When using over-the-counter medication, never use more than what is prescribed on the package directions. Do not take more than 3000mg of Tylenolâ in a 24 hour period. Do not take more than 2400mg of Ibuprofen (such as Motrinâ or Advilâ) in a 24 hour period.   While taking prescription pain medication you may not drive, work, or drink alcohol.     Are there any diet restrictions?    Continue low fiber diet up to 1 week post op.  (This allows the bowel to rest)    It is normal for bowel movements to be loose and occur more frequently post-operatively. This should improve over time.  During this time pay attention to hydration  1.5 weeks post op you may slowly begin to add fiber back into your diet.    By 2 weeks post op you may resume a normal high fiber diet.     What activity restrictions will I have?   Walk as much as tolerated.  Do not lift, pull, or push objects greater than 10 pounds for 6 weeks. This includes vacuuming, lifting children or groceries, walking the dog, mowing lawns, snow shoveling, etc. Please discuss other specific physical activities with the doctor at your post op appointment.   Do not drive while taking pain medications. You may drive when you have no pain - usually in 1-2 weeks following surgery.   You may climb stairs in moderation but do not overtire.  Resume sexual activity after you are cleared by your doctor.             When will I receive follow-up care?   You will be scheduled to return to see your physician in the office typically in 3-4 weeks after surgery.    If you do not have a scheduled appointment at the time of discharge, please call the office at [phone].    When should I call my doctor?   Notify your doctor for any of the following signs and symptoms of possible infection:   Temperature above 101 degrees.   Increase in pain or discomfort.   Redness, swelling, drainage at incision site(s). A small amount of clear yellow or pinkish-yellow drainage is normal  If incision begins to open.     Call if you have any changes in your overall health such as having:   Nausea   Vomiting   Chills   profuse (excessive) sweating   inability to urinate or completely empty bladder   diarrhea   constipation \

## 2024-07-31 ENCOUNTER — TELEPHONE (OUTPATIENT)
Dept: ANESTHESIOLOGY | Facility: CLINIC | Age: 59
End: 2024-07-31

## 2024-08-02 ENCOUNTER — TELEPHONE (OUTPATIENT)
Dept: OTHER | Facility: HOSPITAL | Age: 59
End: 2024-08-02

## 2024-08-02 DIAGNOSIS — N13.5 URETERAL STRICTURE: ICD-10-CM

## 2024-08-02 DIAGNOSIS — N20.1 URETERAL CALCULI: ICD-10-CM

## 2024-08-02 RX ORDER — TAMSULOSIN HYDROCHLORIDE 0.4 MG/1
0.4 CAPSULE ORAL
Qty: 90 CAPSULE | Refills: 3 | Status: SHIPPED | OUTPATIENT
Start: 2024-08-02

## 2024-08-02 RX ORDER — OXYBUTYNIN CHLORIDE 5 MG/1
5 TABLET ORAL EVERY 6 HOURS PRN
Qty: 90 TABLET | Refills: 3 | Status: SHIPPED | OUTPATIENT
Start: 2024-08-02

## 2024-08-11 DIAGNOSIS — D86.89 HEPATIC GRANULOMA ASSOCIATED WITH SARCOIDOSIS: ICD-10-CM

## 2024-08-11 RX ORDER — URSODIOL 300 MG/1
900 CAPSULE ORAL DAILY
Qty: 90 CAPSULE | Refills: 1 | Status: SHIPPED | OUTPATIENT
Start: 2024-08-11 | End: 2025-08-11

## 2024-08-14 ENCOUNTER — TELEPHONE (OUTPATIENT)
Dept: GASTROENTEROLOGY | Facility: CLINIC | Age: 59
End: 2024-08-14

## 2024-08-14 ENCOUNTER — HOSPITAL ENCOUNTER (OUTPATIENT)
Dept: INFUSION CENTER | Facility: CLINIC | Age: 59
Discharge: HOME | End: 2024-08-14
Payer: COMMERCIAL

## 2024-08-14 DIAGNOSIS — R79.89 ABNORMAL LIVER FUNCTION TESTS: Primary | ICD-10-CM

## 2024-08-14 DIAGNOSIS — Z95.828 PORT-A-CATH IN PLACE: Primary | ICD-10-CM

## 2024-08-14 DIAGNOSIS — D86.9 SARCOIDOSIS: ICD-10-CM

## 2024-08-14 PROCEDURE — 96523 IRRIG DRUG DELIVERY DEVICE: CPT

## 2024-08-14 NOTE — PROGRESS NOTES
Pt. offers no complaints. Pt stated she had labs due, no labs ordered, port flushed. Pt's  stated he would call Dr. Machuca to see when labs to be drawn, he will call to make next appt. AVS declined.

## 2024-08-15 ENCOUNTER — APPOINTMENT (OUTPATIENT)
Dept: LAB | Facility: AMBULARY SURGERY CENTER | Age: 59
End: 2024-08-15
Attending: STUDENT IN AN ORGANIZED HEALTH CARE EDUCATION/TRAINING PROGRAM
Payer: COMMERCIAL

## 2024-08-15 DIAGNOSIS — D86.9 SARCOIDOSIS: ICD-10-CM

## 2024-08-15 DIAGNOSIS — R79.89 ABNORMAL LIVER FUNCTION TESTS: ICD-10-CM

## 2024-08-15 LAB
ALBUMIN SERPL BCG-MCNC: 4.1 G/DL (ref 3.5–5)
ALP SERPL-CCNC: 105 U/L (ref 34–104)
ALT SERPL W P-5'-P-CCNC: 31 U/L (ref 7–52)
ANION GAP SERPL CALCULATED.3IONS-SCNC: 8 MMOL/L (ref 4–13)
AST SERPL W P-5'-P-CCNC: 25 U/L (ref 13–39)
BASOPHILS # BLD AUTO: 0.01 THOUSANDS/ÂΜL (ref 0–0.1)
BASOPHILS NFR BLD AUTO: 0 % (ref 0–1)
BILIRUB SERPL-MCNC: 0.58 MG/DL (ref 0.2–1)
BUN SERPL-MCNC: 25 MG/DL (ref 5–25)
CALCIUM SERPL-MCNC: 10.2 MG/DL (ref 8.4–10.2)
CHLORIDE SERPL-SCNC: 104 MMOL/L (ref 96–108)
CO2 SERPL-SCNC: 28 MMOL/L (ref 21–32)
CREAT SERPL-MCNC: 1.09 MG/DL (ref 0.6–1.3)
CRP SERPL QL: 8 MG/L
EOSINOPHIL # BLD AUTO: 0.29 THOUSAND/ÂΜL (ref 0–0.61)
EOSINOPHIL NFR BLD AUTO: 6 % (ref 0–6)
ERYTHROCYTE [DISTWIDTH] IN BLOOD BY AUTOMATED COUNT: 13.4 % (ref 11.6–15.1)
ERYTHROCYTE [SEDIMENTATION RATE] IN BLOOD: 22 MM/HOUR (ref 0–29)
GFR SERPL CREATININE-BSD FRML MDRD: 56 ML/MIN/1.73SQ M
GGT SERPL-CCNC: 45 U/L (ref 9–64)
GLUCOSE P FAST SERPL-MCNC: 81 MG/DL (ref 65–99)
HCT VFR BLD AUTO: 34.8 % (ref 34.8–46.1)
HGB BLD-MCNC: 11.1 G/DL (ref 11.5–15.4)
IMM GRANULOCYTES # BLD AUTO: 0.02 THOUSAND/UL (ref 0–0.2)
IMM GRANULOCYTES NFR BLD AUTO: 0 % (ref 0–2)
INR PPP: 0.99 (ref 0.85–1.19)
LYMPHOCYTES # BLD AUTO: 0.32 THOUSANDS/ÂΜL (ref 0.6–4.47)
LYMPHOCYTES NFR BLD AUTO: 7 % (ref 14–44)
MCH RBC QN AUTO: 28.5 PG (ref 26.8–34.3)
MCHC RBC AUTO-ENTMCNC: 31.9 G/DL (ref 31.4–37.4)
MCV RBC AUTO: 90 FL (ref 82–98)
MONOCYTES # BLD AUTO: 0.31 THOUSAND/ÂΜL (ref 0.17–1.22)
MONOCYTES NFR BLD AUTO: 7 % (ref 4–12)
NEUTROPHILS # BLD AUTO: 3.82 THOUSANDS/ÂΜL (ref 1.85–7.62)
NEUTS SEG NFR BLD AUTO: 80 % (ref 43–75)
NRBC BLD AUTO-RTO: 0 /100 WBCS
PLATELET # BLD AUTO: 251 THOUSANDS/UL (ref 149–390)
PMV BLD AUTO: 9.6 FL (ref 8.9–12.7)
POTASSIUM SERPL-SCNC: 4.3 MMOL/L (ref 3.5–5.3)
PROT SERPL-MCNC: 6.9 G/DL (ref 6.4–8.4)
PROTHROMBIN TIME: 13.4 SECONDS (ref 12.3–15)
RBC # BLD AUTO: 3.89 MILLION/UL (ref 3.81–5.12)
SODIUM SERPL-SCNC: 140 MMOL/L (ref 135–147)
WBC # BLD AUTO: 4.77 THOUSAND/UL (ref 4.31–10.16)

## 2024-08-15 PROCEDURE — 82977 ASSAY OF GGT: CPT

## 2024-08-15 PROCEDURE — 86140 C-REACTIVE PROTEIN: CPT

## 2024-08-15 PROCEDURE — 85025 COMPLETE CBC W/AUTO DIFF WBC: CPT

## 2024-08-15 PROCEDURE — 80053 COMPREHEN METABOLIC PANEL: CPT

## 2024-08-15 PROCEDURE — 36415 COLL VENOUS BLD VENIPUNCTURE: CPT

## 2024-08-15 PROCEDURE — 85652 RBC SED RATE AUTOMATED: CPT

## 2024-08-15 PROCEDURE — 82164 ANGIOTENSIN I ENZYME TEST: CPT

## 2024-08-15 PROCEDURE — 85610 PROTHROMBIN TIME: CPT

## 2024-08-16 LAB — ACE SERPL-CCNC: 117 U/L (ref 14–82)

## 2024-08-22 DIAGNOSIS — G89.3 CANCER RELATED PAIN: ICD-10-CM

## 2024-08-22 DIAGNOSIS — Z51.5 PALLIATIVE CARE PATIENT: ICD-10-CM

## 2024-08-22 DIAGNOSIS — C18.7 MALIGNANT NEOPLASM OF SIGMOID COLON (HCC): ICD-10-CM

## 2024-08-22 RX ORDER — OXYCODONE HYDROCHLORIDE 5 MG/1
5 TABLET ORAL EVERY 4 HOURS PRN
Qty: 120 TABLET | Refills: 0 | Status: SHIPPED | OUTPATIENT
Start: 2024-08-22

## 2024-08-22 NOTE — TELEPHONE ENCOUNTER
Patient's family member sent [employer] message requesting refill.    Medication: Oxycodone 5mg immediate release tablet    Dose/Frequency: Take 1 tablet (5 mg total) by mouth every 4 (four) hours as needed for moderate pain Max Daily Amount: 30 mg     Quantity: 120    Pharmacy: [facility]    Office:   [] PCP/Provider -   [x] Speciality/Provider - Dr. Pillai    Does the patient have enough for 3 days?   Unsure, awaiting response

## 2024-08-22 NOTE — TELEPHONE ENCOUNTER
Reviewed most recent [facility] note, reviewed PDMP, reviewed medications. Refilling medication ( oxyIR ).

## 2024-08-22 NOTE — TELEPHONE ENCOUNTER
Medication: oxycodone 5mg  PDMP   07/25/2024 07/25/2024 oxyCODONE HCL (Tablet) 120.0 20 5 MG 45.0 AMADOU FELICITY   10/11/2023 10/11/2023 oxyCODONE HCL (Tablet) 5.0 2 5 MG 18.75 CASE LANDA      Refill must be reviewed and completed by the office or provider. The refill is unable to be approved or denied by the medication management team.

## 2024-08-26 ENCOUNTER — TELEPHONE (OUTPATIENT)
Dept: NEPHROLOGY | Facility: CLINIC | Age: 59
End: 2024-08-26

## 2024-08-27 ENCOUNTER — TELEMEDICINE (OUTPATIENT)
Dept: NEPHROLOGY | Facility: CLINIC | Age: 59
End: 2024-08-27
Payer: COMMERCIAL

## 2024-08-27 VITALS — HEIGHT: 62 IN | BODY MASS INDEX: 23.19 KG/M2 | WEIGHT: 126 LBS

## 2024-08-27 DIAGNOSIS — M89.9 CHRONIC KIDNEY DISEASE-MINERAL BONE DISORDER (CKD-MBD) WITH STAGE 3A CHRONIC KIDNEY DISEASE (HCC): ICD-10-CM

## 2024-08-27 DIAGNOSIS — N18.31 CHRONIC KIDNEY DISEASE-MINERAL BONE DISORDER (CKD-MBD) WITH STAGE 3A CHRONIC KIDNEY DISEASE (HCC): ICD-10-CM

## 2024-08-27 DIAGNOSIS — E27.8 ADRENAL NODULE (HCC): ICD-10-CM

## 2024-08-27 DIAGNOSIS — E83.42 HYPOMAGNESEMIA: ICD-10-CM

## 2024-08-27 DIAGNOSIS — N20.1 URETERAL CALCULI: ICD-10-CM

## 2024-08-27 DIAGNOSIS — D50.8 IRON DEFICIENCY ANEMIA SECONDARY TO INADEQUATE DIETARY IRON INTAKE: ICD-10-CM

## 2024-08-27 DIAGNOSIS — N18.31 STAGE 3A CHRONIC KIDNEY DISEASE (HCC): Primary | ICD-10-CM

## 2024-08-27 DIAGNOSIS — E87.6 HYPOKALEMIA: ICD-10-CM

## 2024-08-27 DIAGNOSIS — E83.9 CHRONIC KIDNEY DISEASE-MINERAL BONE DISORDER (CKD-MBD) WITH STAGE 3A CHRONIC KIDNEY DISEASE (HCC): ICD-10-CM

## 2024-08-27 DIAGNOSIS — D50.9 IRON DEFICIENCY ANEMIA, UNSPECIFIED IRON DEFICIENCY ANEMIA TYPE: ICD-10-CM

## 2024-08-27 DIAGNOSIS — E87.3 METABOLIC ALKALOSIS: ICD-10-CM

## 2024-08-27 DIAGNOSIS — C20 RECTAL CANCER (HCC): ICD-10-CM

## 2024-08-27 PROCEDURE — 99214 OFFICE O/P EST MOD 30 MIN: CPT | Performed by: STUDENT IN AN ORGANIZED HEALTH CARE EDUCATION/TRAINING PROGRAM

## 2024-08-27 NOTE — PROGRESS NOTES
Virtual Regular Visit  Name: Danielle Haque      : 1965      MRN: [id number]  Encounter Provider: Joselyn Reyes Bahamonde, MD  Encounter Date: 2024   Encounter department: [facility]    Verification of patient location:    Patient is located at Home in the following state in which I hold an active license [place]    Assessment & Plan   1. Stage 3a chronic kidney disease (HCC)  2. Iron deficiency anemia, unspecified iron deficiency anemia type  3. Hypomagnesemia  4. Adrenal nodule (HCC)  5. Ureteral calculi with hydroureteronephrosis  6. Iron deficiency anemia secondary to inadequate dietary iron intake  7. Hypokalemia  8. Rectal cancer (HCC)  9. Metabolic alkalosis  10. Chronic kidney disease-mineral bone disorder (CKD-MBD) with stage 3a chronic kidney disease (HCC)        58 y.o woman with PMH cT3 cN0 cM0 rectal adenocarcinoma in the [facility] s/p diverting ileostomy, started on neoadjuvant CapeOx, complicated with reaction on the second cycle, treated with Xeloda,5-FU with RT , renal stricture with bilateral stent , sarcoidosis, on steroids.  Patient was admitted in 2023 with SBO complicated with hypokalemia.  Patient has a follow-up electrolyte disturbance and CKD     PLAN:     # Hypokalemia/ypomagnesemia  Potassium 4.3 mg/L  Potassium is stable continue with 40 mg twice daily for now   Will repeat BMP and adjust potassium tablets increase potassium in her diet   Patient has an adrenal nodule with a RR of 20, normotensive, unlikely to be the reason of hypokalemia        #Volume status/hypertension:  Volume-: Euvolemic   Blood pressure: Normotensive, BP on the low 100s  Recommend   Low sodium diet  BP well-controlled with amlodipine   no evidence of hyperaldosteronism   Advised to maintain a good BP control to prevent progression of CKD         # Rectal adenocarcinoma  Status post diverting ileostomy  5-FU with RT  Follow-up with  oncology  Patient is scheduled for ileostomy reversal        # Adrenal nodule  8 mm right adrenal nodule  ARR 20  Normotensive, no evidence of hyper Blair  potassium stable with potassium supplements   monitor        # CKD G3a  Etiology: Secondary to obstructive uropathy with right hydronephrosis in the settings of nephrolithiasis  Current creatinine: 1.09, stable at baseline   UA: Leukocyturia, hematuria  Renal imaging : Right-sided hydronephrosis, resolved  Treatment:   Increase fluid intake   will monitor BMP, UACR     # Acid-base Disorder  serum HCO3 28 mmol/L  metabolic alkalosis likely secondary to poor fluid intake     # CKD MBD  Serum calcium 10.2  At goal     #Anemia:  Current hemoglobin:11.2  Iron deficiency anemia  On iron tablets  Will recheck iron levels      # nephrolithiasis  Kidney stone has right-sided obstruction  Follow-up with urology  Enforce fluid intake  Status post stent       Encounter provider Joselyn Reyes Bahamonde, MD    The patient was identified by name and date of birth. Danielle Haque was informed that this is a telemedicine visit and that the visit is being conducted through the [employer] platform. She agrees to proceed..  My office door was closed. No one else was in the room.  She acknowledged consent and understanding of privacy and security of the video platform. The patient has agreed to participate and understands they can discontinue the visit at any time.    Patient is aware this is a billable service.     History of Present Illness     Danielle Haque is a 58 y.o. female patient for electrolyte disturbance and CKD. Feels well, no SOB, no CP, no recent hospitalizations. No issues with medication   Patient is scheduled for ileostomy reversal and  stent exchange    Review of Systems   Constitutional:  Negative for activity change and appetite change.   HENT:  Negative for congestion and dental problem.    Eyes:  Negative for discharge.   Respiratory:  Negative for apnea.   "  Cardiovascular:  Negative for chest pain and leg swelling.   Gastrointestinal:  Negative for abdominal distention and abdominal pain.   Endocrine: Negative for cold intolerance.   Genitourinary:  Negative for dysuria.   Musculoskeletal:  Negative for arthralgias and back pain.   Skin:  Negative for color change and pallor.   Neurological:  Negative for dizziness.   Psychiatric/Behavioral:  Negative for agitation.        Objective     Ht 5' 2\" (1.575 m)   Wt 57.2 kg (126 lb)   BMI 23.05 kg/m²   Physical Exam  General:  no acute distress at this time  Skin:  No acute rash  Eyes:  No scleral icterus and noninjected  ENT:  mucous membranes moist  Neck:  no carotid bruits  Chest:   good respiratory effort, no use of accessory respiratory muscles  CVS:  no edema ,no JVD   Abdomen:  non distended  Extremities: no significant lower extremity edema  Neuro:  No gross focality  Psych:  Alert , cooperative       Visit Time  Total Visit Duration: 30 min        "

## 2024-08-27 NOTE — PATIENT INSTRUCTIONS
Thank you for coming to your visit today. As we discussed you kidney function is back to baseline, your creatinine is 1.09 mg/dL.  Your electrolytes are normal. Please follow the recommendations below       Recommend low sodium (salt) food    Avoid nonsteroidal anti-inflammatory drugs such as Naprosyn, ibuprofen, Aleve, Advil, Celebrex, Meloxicam (Mobic) etc.  You can use Tylenol as needed if you do not have any liver condition to be concerned about        Try to exercise at least 30 minutes 3 days a week to begin with with an ultimate goal of 5 days a week for at least 30 minutes    Will check your iron levels      Next Visit in 4 months with results   If you need to see us earlier we can change the appointment for you      Joselyn Reyes Bahamonde, MD  Nephrology Attending

## 2024-08-29 ENCOUNTER — ANESTHESIA EVENT (OUTPATIENT)
Dept: ANESTHESIOLOGY | Facility: HOSPITAL | Age: 59
End: 2024-08-29

## 2024-08-29 ENCOUNTER — TELEMEDICINE (OUTPATIENT)
Dept: ANESTHESIOLOGY | Facility: CLINIC | Age: 59
End: 2024-08-29
Payer: COMMERCIAL

## 2024-08-29 ENCOUNTER — TELEPHONE (OUTPATIENT)
Dept: ANESTHESIOLOGY | Facility: CLINIC | Age: 59
End: 2024-08-29

## 2024-08-29 ENCOUNTER — ANESTHESIA (OUTPATIENT)
Dept: ANESTHESIOLOGY | Facility: HOSPITAL | Age: 59
End: 2024-08-29

## 2024-08-29 ENCOUNTER — TELEPHONE (OUTPATIENT)
Dept: HEMATOLOGY ONCOLOGY | Facility: CLINIC | Age: 59
End: 2024-08-29

## 2024-08-29 DIAGNOSIS — I10 PRIMARY HYPERTENSION: ICD-10-CM

## 2024-08-29 DIAGNOSIS — C20 RECTAL CANCER (HCC): ICD-10-CM

## 2024-08-29 DIAGNOSIS — D50.9 IRON DEFICIENCY ANEMIA, UNSPECIFIED IRON DEFICIENCY ANEMIA TYPE: ICD-10-CM

## 2024-08-29 DIAGNOSIS — C18.7 MALIGNANT NEOPLASM OF SIGMOID COLON (HCC): Primary | ICD-10-CM

## 2024-08-29 DIAGNOSIS — N18.31 STAGE 3A CHRONIC KIDNEY DISEASE (HCC): ICD-10-CM

## 2024-08-29 PROCEDURE — 99214 OFFICE O/P EST MOD 30 MIN: CPT | Performed by: NURSE PRACTITIONER

## 2024-08-29 RX ORDER — SODIUM CHLORIDE 9 MG/ML
20 INJECTION, SOLUTION INTRAVENOUS ONCE
OUTPATIENT
Start: 2024-08-30

## 2024-08-29 NOTE — PROGRESS NOTES
[facility]  CONSULT: SURGERY optimization    Virtual Regular Visit    Name: Danielle Haque      : 1965      MRN: [id number]  Encounter Provider: ANTHONY Schwartz  Encounter Date: 2024   Encounter department: [facility]    Verification of patient location:    Patient is located at Home in the following state in which I hold an active license [place]    Assessment & Plan    58  year old female referred to SOC for pre-surgery optimization & BEST program   Other consult concerns include: BEST. SOC anemia   She is scheduled on     Case: [id number] Date/Time: 2455   Procedures:      SIGMOIDOSCOPY FLEXIBLE (Abdomen)      CLOSURE ILEOSTOMY (Abdomen)   Anesthesia type: General   Diagnosis: Rectal cancer (HCC) [C20]   Pre-op diagnosis: Rectal cancer (HCC) [C20]   Location: [facility] ROOM [facility]   Surgeons: Sree Umanzor MD       Last anesthesia   Denies ever having a problem      Malignant neoplasm of sigmoid colon (HCC)  Rectal cancer (HCC)  -     Ambulatory Referral to Surgical Optimization  Seen for So  Seen for SOC anemia- one time dose IV feraheme ordered   Started BEST   At risk for post-op THOR - yes 2.2 RI   At risk for post-op SSI- no   BEST   Breathing- instructed to exercise lungs prior to surgery via deep breathing   Eat- discussed increasing protein intake prior to surgery   Sleep/stress- encouraged 8-10 sleep @ night, stress reduction, avoid sick contacts and handwashing   Train- encouraged to remain active      2. Primary hypertension  Stable   Denies concerns  Unable to check BP at home today     4. Iron deficiency anemia, unspecified iron deficiency anemia type  Prefers [facility]  PREFER MORNING APTS  Spoke with patient and her .  Reviewed labs which showed iron deficiency anemia which is most likely due NOLVIA.  Given planned surgery, COLORECTAL, plan to optimize further with  parenteral iron.  I have discussed the risks and benefits of parenteral iron with the patient and they wish to proceed.  Discussion of risks included but was not limited to change in taste, diarrhea, muscle cramps, nausea or vomiting, pain in the arms or legs, pain or burning sensation in the injection site, allergic reaction.  The patient verbalized understanding and wishes to proceed.     Danielle Haque is to be scheduled at the infusion center of their choice, [facility].  Following infusions assuming expected hematologic response, can proceed to surgery.    All questions answered to Danielle Haque's satisfaction.    ANTHONY Schwartz    Detailed Labs which I have personally reviewed:  Lab Results   Component Value Date    HGB 11.1 (L) 08/15/2024    HCT 34.8 08/15/2024    MCV 90 08/15/2024     08/15/2024    WBC 4.77 08/15/2024    NRBC 0 08/15/2024    BANDSPCT 2 02/07/2024    ATYLMPCT 1 (H) 08/23/2023     Lab Results   Component Value Date    K 4.3 08/15/2024     08/15/2024    CO2 28 08/15/2024    BUN 25 08/15/2024    CREATININE 1.09 08/15/2024    GLUF 81 08/15/2024    CALCIUM 10.2 08/15/2024    CORRECTEDCA 9.2 03/02/2024    AST 25 08/15/2024    ALT 31 08/15/2024    ALKPHOS 105 (H) 08/15/2024    EGFR 56 08/15/2024       Lab Results   Component Value Date    IRON 48 (L) 03/07/2024    TIBC 721 (H) 03/07/2024    FERRITIN 40 03/07/2024         5. Stage 3a chronic kidney disease (HCC)  High risk kevin   Current egfr >45- may see nephrology post-op    Latest Reference Range & Units 08/15/24 07:30   Sodium 135 - 147 mmol/L 140   Potassium 3.5 - 5.3 mmol/L 4.3   Chloride 96 - 108 mmol/L 104   Carbon Dioxide 21 - 32 mmol/L 28   ANION GAP 4 - 13 mmol/L 8   BUN 5 - 25 mg/dL 25   Creatinine 0.60 - 1.30 mg/dL 1.09   GLUCOSE, FASTING 65 - 99 mg/dL 81   Calcium 8.4 - 10.2 mg/dL 10.2   AST 13 - 39 U/L 25   ALT 7 - 52 U/L 31   ALK PHOS 34 - 104 U/L 105 (H)   Total Protein 6.4 - 8.4 g/dL 6.9   Albumin 3.5 - 5.0 g/dL 4.1    Total Bilirubin 0.20 - 1.00 mg/dL 0.58   GFR, Calculated ml/min/1.73sq m 56   GGT 9 - 64 U/L 45   (H): Data is abnormally high      Encounter provider ANTHONY Schwartz    The patient was identified by name and date of birth. Danielle Haque was informed that this is a telemedicine visit and that the visit is being conducted through the [employer] platform. She agrees to proceed..  My office door was closed. No one else was in the room.  She acknowledged consent and understanding of privacy and security of the video platform. The patient has agreed to participate and understands they can discontinue the visit at any time.    Patient is aware this is a billable service.     History of Present Illness     Danielle Haque is a 58 y.o. female who presents to [facility] for presurgery optimization.    I met patient over video.  Picture was clear & audio was good.  She was present with her .  Both are pleasant and a pleasure to care for.    Danielle explains that back in 2022 she was having severe abdominal pain.  She went to the emergency room and ended up  in the hospital for 6 days.  During that stay she had a creation of an ileostomy.  She then was diagnosed with rectal cancer.  She underwent chemo.  She is now since recovered and looking forward to having her ileostomy closed.    She lives at home with her .  Her  is her support person.  She is independent with all ADLs.  She admits to being in well health today.  Denies any new developments in her health.      Surgical optimization  Surgical optimization complete.      THOR risk is high due to renal insufficiency.  Her current GFR is above 45 so if she can see nephrology postop at the bedside     SSI risk no    SOC anemia-she does have a history of anemia.  Current hemoglobin is 11.1.  She saw her kidney doctor who planned to keep her on p.o. iron.  I suggested doing IV iron infusion before the surgery.  Will await approval from nephrology and then SOC  to schedule      As always we discussed having your BEST surgery, and BEST recovery.  Surgery goals reviewed today.      Breathing exercises   Patient was encouraged to begin lung exercises today.  This could be accomplished through deep breathing and cough exercises.      Eating/nutrition   Encouraged patient to increase oral protein intake prior to surgery.    This can be accomplished by consuming chicken, fish, tuna fish, cottage cheese, cheese, eggs, Greek yogurt, and protein shakes as needed.  I encouraged use of protein shakes such ENLIVE.  I also recommended making your own protein shakes with protein powder.   Sleep/Stress management  Patient was encouraged to rest their body prior to surgery.  Encouraged attempting to get 8 hours of sleep at night.  Avoid stress.  Avoid sick contacts.  Encouraged to find a relaxing hobby such as reading, meditation, listening to music.  Training exercises  Patient was encouraged to remain active as possible.  Today bilateral lower extremity generic exercises were taught for muscle strengthening and balance.  All exercises to be done sitting down.     Review of Systems   Constitutional:  Negative for chills and fever.   HENT:  Negative for sinus pain and sore throat.    Respiratory:  Negative for cough and choking.    Gastrointestinal:  Positive for nausea. Negative for diarrhea and vomiting.   Genitourinary:  Negative for difficulty urinating and dyspareunia.   Skin:  Negative for color change, pallor, rash and wound.   Neurological:  Negative for dizziness, seizures, facial asymmetry, light-headedness and headaches.   Psychiatric/Behavioral:  Negative for agitation, behavioral problems, confusion and decreased concentration.      Past Medical History   Past Medical History:   Diagnosis Date    Bleeding from colostomy stoma (HCC)      states ileostomy , not colostomy    Cancer (HCC)     Colon cancer (HCC)     Fall     Headache     Hemochromatosis, unspecified      History of transfusion     2022 - no adverse reaction    Hypertension     Kidney calculi     Kidney stone     Liver disease     hemangioma    Muscle weakness     Personal history of COVID-19 12/2022    Sarcoidosis     Seizures (HCC)     2022    Shingles      Past Surgical History:   Procedure Laterality Date    ABSCESS DRAINAGE      left breast    BREAST BIOPSY      CHEST TUBE INSERTION      COLON SURGERY      colostomy    COLONOSCOPY      FL GUIDED CENTRAL VENOUS ACCESS DEVICE INSERTION  03/21/2023    FL RETROGRADE PYELOGRAM  01/23/2023    FL RETROGRADE PYELOGRAM  04/03/2023    FL RETROGRADE PYELOGRAM  7/21/2023    FL RETROGRADE PYELOGRAM  10/11/2023    FL RETROGRADE PYELOGRAM  12/15/2023    FL RETROGRADE PYELOGRAM  2/8/2024    FL RETROGRADE PYELOGRAM  5/10/2024    IR BIOPSY LIVER MASS  05/09/2023    LUNG BIOPSY      FL CYSTO BLADDER W/URETERAL CATHETERIZATION Bilateral 07/21/2023    Procedure: CYSTOSCOPY URETEROSCOPY RETROGRADE PYELOGRAM WITH BILATERAL STENT URETERAL EXCHANGE; LEFT LASER LITHOTRIPSY AND STONE BASKET RETRIEVAL;  Surgeon: José Luis Stephens MD;  [facility];  Service: Urology    FL CYSTO BLADDER W/URETERAL CATHETERIZATION Bilateral 12/15/2023    Procedure: CYSTOSCOPY, BILATERAL  RETROGRADE PYELOGRAM , STENT EXCHANGE RIGHT , LEFT URETEROSCOPY ,  HOLMIUM LASER WITH INSERTION LEFT URETERAL STENT;  Surgeon: Derick Bhakta MD;  [facility];  Service: Urology    FL CYSTO BLADDER W/URETERAL CATHETERIZATION Left 2/8/2024    Procedure: CYSTOSCOPY B/L RETROGRADE PYELOGRAM WITH B/L T URETERALSTENT EXCHANGE,LEFT URETEROSCOPY, DILATION LEFT URETERAL STICTURE;  Surgeon: José Luis Stephens MD;  [facility];  Service: Urology    FL CYSTO BLADDER W/URETERAL CATHETERIZATION Bilateral 5/10/2024    Procedure: CYSTOSCOPY RETROGRADE PYELOGRAM WITH INSERTION STENT URETERAL;  Surgeon: José Lius Stephens MD;  [facility];  Service: Urology    FL CYSTO W/INSERT  URETERAL STENT Bilateral 5/10/2024    Procedure: EXCHANGE STENT URETERAL;  Surgeon: José Luis Stephens MD;  [facility];  Service: Urology    DC CYSTO/URETERO W/LITHOTRIPSY &INDWELL STENT INSRT Bilateral 01/23/2023    Procedure: CYSTOSCOPY  RIGHT URETEROSCOPY WITH LITHOTRIPSY HOLMIUM LASER, BILATERAL RETROGRADE PYELOGRAM AND BILATERAL  URETERAL STENT INSERTION;  Surgeon: José Luis Stephens MD;  [facility];  Service: Urology    DC CYSTO/URETERO W/LITHOTRIPSY &INDWELL STENT INSRT Right 04/03/2023    Procedure: RIGHT URETEROSCOPY WITH LITHOTRIPSY HOLMIUM LASER, RETROGRADE PYELOGRAM; BILATERAL EXCHANGE STENT URETERAL;  Surgeon: José Luis Stephens MD;  [facility];  Service: Urology    DC CYSTO/URETERO W/LITHOTRIPSY &INDWELL STENT INSRT Bilateral 10/11/2023    Procedure: CYSTOSCOPY URETEROSCOPY RETROGRADE PYELOGRAM AND INSERTION STENT URETERAL DIAGNOSTINC RIGHT URETEROSCOPY, REMOVAL STENT LEFT SIDE;  Surgeon: José Luis Stephens MD;  [facility];  Service: Urology    DC INSJ TUNNELED CTR VAD W/SUBQ PORT AGE 5 YR/> N/A 03/21/2023    Procedure: INSERTION VENOUS PORT ( PORT-A-CATH) IR;  Surgeon: Mark Amos DO;  [facility];  Service: Interventional Radiology    DC LAPS COLECTOMY PRTL W/COLOPXTSTMY LW ANAST N/A 8/17/2023    Procedure: RESECTION COLON LOW ANTERIOR LAPAROSCOPIC WITH ROBOTICS;  Surgeon: Sree Umanzor MD;  [facility];  Service: Colorectal    TONSILLECTOMY      URINARY SURGERY Bilateral     bilateral stents     Family History   Problem Relation Age of Onset    Cancer Mother     Cirrhosis Father     Breast cancer Neg Hx     Breast cancer additional onset Neg Hx      Current Outpatient Medications on File Prior to Visit   Medication Sig Dispense Refill    amLODIPine (NORVASC) 10 mg tablet TAKE 1 TABLET BY MOUTH EVERY DAY 90 tablet 1    cholecalciferol 400 units tablet Take 400 Units by mouth daily      cyanocobalamin (VITAMIN  B-12) 1000 MCG tablet Take 1,000 mcg by mouth daily      fenofibrate (TRICOR) 145 mg tablet Take 1 tablet (145 mg total) by mouth daily 90 tablet 3    ferrous sulfate 324 (65 Fe) mg Take 1 tablet (324 mg total) by mouth daily before breakfast 30 tablet 0    [START ON 9/2/2024] metroNIDAZOLE (FLAGYL) 500 mg tablet 1 PM AND 10PM Do not start before September 2, 2024. 2 tablet 0    [START ON 9/2/2024] neomycin (MYCIFRADIN) 500 mg tablet 1pm and 10pm Do not start before September 2, 2024. 2 tablet 0    ondansetron (ZOFRAN) 4 mg tablet Take 1 tablet (4 mg total) by mouth every 8 (eight) hours as needed for nausea or vomiting 20 tablet 2    oxybutynin (DITROPAN) 5 mg tablet Take 1 tablet (5 mg total) by mouth every 6 (six) hours as needed (bladder spasms) 90 tablet 3    oxyCODONE (Roxicodone) 5 immediate release tablet Take 1 tablet (5 mg total) by mouth every 4 (four) hours as needed for moderate pain Max Daily Amount: 30 mg 120 tablet 0    potassium chloride (MICRO-K) 10 MEQ CR capsule TAKE 4 CAPSULES (40 MEQ TOTAL) BY MOUTH 2 (TWO) TIMES A  capsule 5    tamsulosin (FLOMAX) 0.4 mg Take 1 capsule (0.4 mg total) by mouth daily with dinner 90 capsule 3    ursodiol (ACTIGALL) 300 mg capsule TAKE 3 CAPSULES (900 MG TOTAL) BY MOUTH IN THE MORNING 90 capsule 1    acetaminophen (TYLENOL) 325 mg tablet Take 2 tablets (650 mg total) by mouth every 4 (four) hours as needed for mild pain (Patient not taking: Reported on 8/29/2024)      acetaminophen (TYLENOL) 500 mg tablet Take 2 tablets (1,000 mg total) by mouth 3 (three) times a day as needed for mild pain, moderate pain, headaches or fever (Patient not taking: Reported on 8/29/2024) 180 tablet 11    estrogens, conjugated (Premarin) vaginal cream Insert 0.3 g into the vagina 3 (three) times a week 30 g 6    oxybutynin (DITROPAN) 5 mg tablet Take 1 tablet (5 mg total) by mouth every 6 (six) hours as needed (bladder spasms) (Patient not taking: Reported on 7/30/2024) 30  "tablet 0    phenazopyridine (PYRIDIUM) 200 mg tablet Take 1 tablet (200 mg total) by mouth 3 (three) times a day as needed for bladder spasms (Patient not taking: Reported on 7/25/2024) 30 tablet 0     No current facility-administered medications on file prior to visit.     Allergies   Allergen Reactions    Oxaliplatin Shortness Of Breath     Reactions occurred with 2nd and 3rd infusions (about 1-3 hours from initiation of infusion) and required treatment with steroids and antihistamines. Please refer to allergy note on 2/7/2023 for detailed description of her reactions.    Morphine Nausea Only     \"Every dose made me nauseous\" (oral dosing only, can tolerate IV morphine)    Potassium Chloride Other (See Comments)     Pt reports \"burning\" with Iv potassium administration and wishes for it to be added to chart      Objective     There were no vitals taken for this visit.  Physical Exam  Neurological:      General: No focal deficit present.      Mental Status: She is alert and oriented to person, place, and time. Mental status is at baseline.   Psychiatric:         Mood and Affect: Mood normal.         Behavior: Behavior normal.         Thought Content: Thought content normal.         Judgment: Judgment normal.         Visit Time  Total Visit Duration: 30        "

## 2024-08-29 NOTE — TELEPHONE ENCOUNTER
Spoke with anay advising appt on 9/3 with Dr. Ferrera has been r/s due to family emergency. New appointment is 10/21 at 2:40 pm with Dr. Ferris in the [place] office. Also placed on the waitlist for sooner appt. Patient will receiving surgery 9/16. Advised if this appt date/time does not work with his schedule to please call [phone].

## 2024-08-29 NOTE — PROGRESS NOTES
[facility]  CONSULT       Patient has the ability to take their vital signs at home NO  Allergies reviewed today   PMH reviewed today   Medications reviewed today     See Assessment below...    Nutrition Assessment Score:10  METS: 6.79     As always we discussed having your BEST surgery, and BEST recovery.  Surgery goals reviewed today.      Breathing exercises   Patient was encouraged to begin lung exercises today.  This could be accomplished through deep breathing and cough exercises.   Eating/nutrition   Encouraged patient to increase oral protein intake prior to surgery.  This can be accomplished by consuming chicken, fish, tuna fish, cottage cheese, cheese, eggs, Greek yogurt, and protein shakes as needed.  I encouraged use of protein shakes such ENLIVE.  I also recommended making your own protein shakes with protein powder.     Sleep/Stress management  Patient was encouraged to rest their body prior to surgery.  Encouraged attempting to get 8 hours of sleep at night.  Avoid stress.  Avoid sick contacts.  Encouraged to find a relaxing hobby such as reading, meditation, listening to music.  Training exercises  Patient was encouraged to remain active as possible.  Today bilateral lower extremity generic exercises were taught for muscle strengthening and balance.  All exercises to be done sitting down.

## 2024-09-10 ENCOUNTER — APPOINTMENT (OUTPATIENT)
Dept: LAB | Facility: AMBULARY SURGERY CENTER | Age: 59
End: 2024-09-10
Payer: COMMERCIAL

## 2024-09-10 ENCOUNTER — TELEPHONE (OUTPATIENT)
Dept: NEPHROLOGY | Facility: CLINIC | Age: 59
End: 2024-09-10

## 2024-09-10 ENCOUNTER — HOSPITAL ENCOUNTER (INPATIENT)
Facility: HOSPITAL | Age: 59
LOS: 3 days | Discharge: HOME | DRG: 425 | End: 2024-09-13
Attending: EMERGENCY MEDICINE | Admitting: INTERNAL MEDICINE
Payer: COMMERCIAL

## 2024-09-10 ENCOUNTER — APPOINTMENT (OUTPATIENT)
Dept: LAB | Facility: AMBULARY SURGERY CENTER | Age: 59
End: 2024-09-10
Attending: COLON & RECTAL SURGERY
Payer: COMMERCIAL

## 2024-09-10 ENCOUNTER — PATIENT MESSAGE (OUTPATIENT)
Dept: UROLOGY | Facility: CLINIC | Age: 59
End: 2024-09-10

## 2024-09-10 ENCOUNTER — ANESTHESIA EVENT (OUTPATIENT)
Dept: PERIOP | Facility: HOSPITAL | Age: 59
End: 2024-09-10
Payer: COMMERCIAL

## 2024-09-10 ENCOUNTER — TELEPHONE (OUTPATIENT)
Age: 59
End: 2024-09-10

## 2024-09-10 DIAGNOSIS — E83.52 HYPERCALCEMIA: Primary | ICD-10-CM

## 2024-09-10 DIAGNOSIS — N17.9 AKI (ACUTE KIDNEY INJURY) (HCC): ICD-10-CM

## 2024-09-10 DIAGNOSIS — C20 RECTAL CANCER (HCC): ICD-10-CM

## 2024-09-10 DIAGNOSIS — D86.9 SARCOIDOSIS: ICD-10-CM

## 2024-09-10 DIAGNOSIS — R39.89 SUSPECTED UTI: ICD-10-CM

## 2024-09-10 DIAGNOSIS — N20.1 URETERAL CALCULI: ICD-10-CM

## 2024-09-10 DIAGNOSIS — N13.5 URETERAL STRICTURE: ICD-10-CM

## 2024-09-10 LAB
ABO GROUP BLD: NORMAL
ALBUMIN SERPL BCG-MCNC: 3.6 G/DL (ref 3.5–5)
ALP SERPL-CCNC: 83 U/L (ref 34–104)
ALT SERPL W P-5'-P-CCNC: 41 U/L (ref 7–52)
ANION GAP SERPL CALCULATED.3IONS-SCNC: 5 MMOL/L (ref 4–13)
ANION GAP SERPL CALCULATED.3IONS-SCNC: 7 MMOL/L (ref 4–13)
AST SERPL W P-5'-P-CCNC: 51 U/L (ref 13–39)
BACTERIA UR QL AUTO: ABNORMAL /HPF
BASOPHILS # BLD AUTO: 0.01 THOUSANDS/ÂΜL (ref 0–0.1)
BASOPHILS NFR BLD AUTO: 0 % (ref 0–1)
BILIRUB DIRECT SERPL-MCNC: 0.3 MG/DL (ref 0–0.2)
BILIRUB SERPL-MCNC: 0.73 MG/DL (ref 0.2–1)
BILIRUB UR QL STRIP: NEGATIVE
BLD GP AB SCN SERPL QL: NEGATIVE
BUN SERPL-MCNC: 24 MG/DL (ref 5–25)
BUN SERPL-MCNC: 25 MG/DL (ref 5–25)
CA-I BLD-SCNC: 1.67 MMOL/L (ref 1.12–1.32)
CALCIUM SERPL-MCNC: 13.2 MG/DL (ref 8.4–10.2)
CALCIUM SERPL-MCNC: 13.4 MG/DL (ref 8.4–10.2)
CHLORIDE SERPL-SCNC: 102 MMOL/L (ref 96–108)
CHLORIDE SERPL-SCNC: 102 MMOL/L (ref 96–108)
CLARITY UR: CLEAR
CO2 SERPL-SCNC: 26 MMOL/L (ref 21–32)
CO2 SERPL-SCNC: 26 MMOL/L (ref 21–32)
COLOR UR: COLORLESS
CREAT SERPL-MCNC: 1.34 MG/DL (ref 0.6–1.3)
CREAT SERPL-MCNC: 1.43 MG/DL (ref 0.6–1.3)
EOSINOPHIL # BLD AUTO: 0.32 THOUSAND/ÂΜL (ref 0–0.61)
EOSINOPHIL NFR BLD AUTO: 9 % (ref 0–6)
ERYTHROCYTE [DISTWIDTH] IN BLOOD BY AUTOMATED COUNT: 12.9 % (ref 11.6–15.1)
GFR SERPL CREATININE-BSD FRML MDRD: 40 ML/MIN/1.73SQ M
GFR SERPL CREATININE-BSD FRML MDRD: 44 ML/MIN/1.73SQ M
GLUCOSE SERPL-MCNC: 101 MG/DL (ref 65–140)
GLUCOSE SERPL-MCNC: 125 MG/DL (ref 65–140)
GLUCOSE UR STRIP-MCNC: NEGATIVE MG/DL
HCT VFR BLD AUTO: 28.5 % (ref 34.8–46.1)
HGB BLD-MCNC: 9.3 G/DL (ref 11.5–15.4)
HGB UR QL STRIP.AUTO: ABNORMAL
IMM GRANULOCYTES # BLD AUTO: 0.01 THOUSAND/UL (ref 0–0.2)
IMM GRANULOCYTES NFR BLD AUTO: 0 % (ref 0–2)
KETONES UR STRIP-MCNC: NEGATIVE MG/DL
LEUKOCYTE ESTERASE UR QL STRIP: ABNORMAL
LYMPHOCYTES # BLD AUTO: 0.26 THOUSANDS/ÂΜL (ref 0.6–4.47)
LYMPHOCYTES NFR BLD AUTO: 8 % (ref 14–44)
MAGNESIUM SERPL-MCNC: 1.7 MG/DL (ref 1.9–2.7)
MCH RBC QN AUTO: 28.7 PG (ref 26.8–34.3)
MCHC RBC AUTO-ENTMCNC: 32.6 G/DL (ref 31.4–37.4)
MCV RBC AUTO: 88 FL (ref 82–98)
MONOCYTES # BLD AUTO: 0.3 THOUSAND/ÂΜL (ref 0.17–1.22)
MONOCYTES NFR BLD AUTO: 9 % (ref 4–12)
NEUTROPHILS # BLD AUTO: 2.5 THOUSANDS/ÂΜL (ref 1.85–7.62)
NEUTS SEG NFR BLD AUTO: 74 % (ref 43–75)
NITRITE UR QL STRIP: NEGATIVE
NON-SQ EPI CELLS URNS QL MICRO: ABNORMAL /HPF
NRBC BLD AUTO-RTO: 0 /100 WBCS
PH UR STRIP.AUTO: 6.5 [PH]
PHOSPHATE SERPL-MCNC: 3.9 MG/DL (ref 2.7–4.5)
PLATELET # BLD AUTO: 192 THOUSANDS/UL (ref 149–390)
PLATELET # BLD AUTO: 203 THOUSANDS/UL (ref 149–390)
PMV BLD AUTO: 9.5 FL (ref 8.9–12.7)
PMV BLD AUTO: 9.7 FL (ref 8.9–12.7)
POTASSIUM SERPL-SCNC: 3.8 MMOL/L (ref 3.5–5.3)
POTASSIUM SERPL-SCNC: 4.2 MMOL/L (ref 3.5–5.3)
PROT SERPL-MCNC: 6.3 G/DL (ref 6.4–8.4)
PROT UR STRIP-MCNC: NEGATIVE MG/DL
PTH-INTACT SERPL-MCNC: 4.7 PG/ML (ref 12–88)
RBC # BLD AUTO: 3.24 MILLION/UL (ref 3.81–5.12)
RBC #/AREA URNS AUTO: ABNORMAL /HPF
RH BLD: POSITIVE
SODIUM SERPL-SCNC: 133 MMOL/L (ref 135–147)
SODIUM SERPL-SCNC: 135 MMOL/L (ref 135–147)
SP GR UR STRIP.AUTO: 1.01 (ref 1–1.03)
SPECIMEN EXPIRATION DATE: NORMAL
UROBILINOGEN UR STRIP-ACNC: <2 MG/DL
WBC # BLD AUTO: 3.4 THOUSAND/UL (ref 4.31–10.16)
WBC #/AREA URNS AUTO: ABNORMAL /HPF

## 2024-09-10 PROCEDURE — 80048 BASIC METABOLIC PNL TOTAL CA: CPT | Performed by: EMERGENCY MEDICINE

## 2024-09-10 PROCEDURE — 84100 ASSAY OF PHOSPHORUS: CPT | Performed by: EMERGENCY MEDICINE

## 2024-09-10 PROCEDURE — 83036 HEMOGLOBIN GLYCOSYLATED A1C: CPT

## 2024-09-10 PROCEDURE — 85025 COMPLETE CBC W/AUTO DIFF WBC: CPT | Performed by: EMERGENCY MEDICINE

## 2024-09-10 PROCEDURE — 80048 BASIC METABOLIC PNL TOTAL CA: CPT

## 2024-09-10 PROCEDURE — 83970 ASSAY OF PARATHORMONE: CPT | Performed by: EMERGENCY MEDICINE

## 2024-09-10 PROCEDURE — 87086 URINE CULTURE/COLONY COUNT: CPT

## 2024-09-10 PROCEDURE — 82330 ASSAY OF CALCIUM: CPT | Performed by: EMERGENCY MEDICINE

## 2024-09-10 PROCEDURE — 99283 EMERGENCY DEPT VISIT LOW MDM: CPT

## 2024-09-10 PROCEDURE — 36415 COLL VENOUS BLD VENIPUNCTURE: CPT

## 2024-09-10 PROCEDURE — 96360 HYDRATION IV INFUSION INIT: CPT

## 2024-09-10 PROCEDURE — 99285 EMERGENCY DEPT VISIT HI MDM: CPT | Performed by: EMERGENCY MEDICINE

## 2024-09-10 PROCEDURE — 99223 1ST HOSP IP/OBS HIGH 75: CPT | Performed by: INTERNAL MEDICINE

## 2024-09-10 PROCEDURE — 81001 URINALYSIS AUTO W/SCOPE: CPT

## 2024-09-10 PROCEDURE — 86901 BLOOD TYPING SEROLOGIC RH(D): CPT

## 2024-09-10 PROCEDURE — 83735 ASSAY OF MAGNESIUM: CPT | Performed by: EMERGENCY MEDICINE

## 2024-09-10 PROCEDURE — 80076 HEPATIC FUNCTION PANEL: CPT | Performed by: INTERNAL MEDICINE

## 2024-09-10 PROCEDURE — 93005 ELECTROCARDIOGRAM TRACING: CPT

## 2024-09-10 PROCEDURE — 86900 BLOOD TYPING SEROLOGIC ABO: CPT

## 2024-09-10 PROCEDURE — 85049 AUTOMATED PLATELET COUNT: CPT

## 2024-09-10 PROCEDURE — 86850 RBC ANTIBODY SCREEN: CPT

## 2024-09-10 RX ORDER — HEPARIN SODIUM 5000 [USP'U]/ML
5000 INJECTION, SOLUTION INTRAVENOUS; SUBCUTANEOUS EVERY 8 HOURS SCHEDULED
Status: DISCONTINUED | OUTPATIENT
Start: 2024-09-10 | End: 2024-09-13 | Stop reason: HOSPADM

## 2024-09-10 RX ORDER — OXYBUTYNIN CHLORIDE 5 MG/1
5 TABLET ORAL EVERY 6 HOURS PRN
Status: DISCONTINUED | OUTPATIENT
Start: 2024-09-10 | End: 2024-09-13 | Stop reason: HOSPADM

## 2024-09-10 RX ORDER — TAMSULOSIN HYDROCHLORIDE 0.4 MG/1
0.4 CAPSULE ORAL
Status: DISCONTINUED | OUTPATIENT
Start: 2024-09-11 | End: 2024-09-13 | Stop reason: HOSPADM

## 2024-09-10 RX ORDER — ACETAMINOPHEN 325 MG/1
650 TABLET ORAL EVERY 6 HOURS PRN
Status: DISCONTINUED | OUTPATIENT
Start: 2024-09-10 | End: 2024-09-13 | Stop reason: HOSPADM

## 2024-09-10 RX ORDER — POTASSIUM CHLORIDE 1500 MG/1
40 TABLET, EXTENDED RELEASE ORAL 2 TIMES DAILY
Status: DISCONTINUED | OUTPATIENT
Start: 2024-09-10 | End: 2024-09-13 | Stop reason: HOSPADM

## 2024-09-10 RX ORDER — FENOFIBRATE 145 MG/1
145 TABLET, COATED ORAL DAILY
Status: DISCONTINUED | OUTPATIENT
Start: 2024-09-11 | End: 2024-09-13 | Stop reason: HOSPADM

## 2024-09-10 RX ORDER — URSODIOL 300 MG/1
900 CAPSULE ORAL DAILY
Status: DISCONTINUED | OUTPATIENT
Start: 2024-09-11 | End: 2024-09-13 | Stop reason: HOSPADM

## 2024-09-10 RX ORDER — ONDANSETRON 2 MG/ML
4 INJECTION INTRAMUSCULAR; INTRAVENOUS EVERY 6 HOURS PRN
Status: DISCONTINUED | OUTPATIENT
Start: 2024-09-10 | End: 2024-09-13 | Stop reason: HOSPADM

## 2024-09-10 RX ORDER — FERROUS SULFATE 325(65) MG
325 TABLET ORAL
Status: DISCONTINUED | OUTPATIENT
Start: 2024-09-11 | End: 2024-09-13 | Stop reason: HOSPADM

## 2024-09-10 RX ORDER — AMLODIPINE BESYLATE 10 MG/1
10 TABLET ORAL DAILY
Status: DISCONTINUED | OUTPATIENT
Start: 2024-09-11 | End: 2024-09-13 | Stop reason: HOSPADM

## 2024-09-10 RX ORDER — OXYCODONE HYDROCHLORIDE 5 MG/1
5 TABLET ORAL EVERY 4 HOURS PRN
Status: DISCONTINUED | OUTPATIENT
Start: 2024-09-10 | End: 2024-09-13 | Stop reason: HOSPADM

## 2024-09-10 RX ORDER — SODIUM CHLORIDE 9 MG/ML
100 INJECTION, SOLUTION INTRAVENOUS CONTINUOUS
Status: DISPENSED | OUTPATIENT
Start: 2024-09-10 | End: 2024-09-12

## 2024-09-10 RX ADMIN — OXYBUTYNIN CHLORIDE 5 MG: 5 TABLET ORAL at 22:04

## 2024-09-10 RX ADMIN — HEPARIN SODIUM 5000 UNITS: 5000 INJECTION INTRAVENOUS; SUBCUTANEOUS at 21:21

## 2024-09-10 RX ADMIN — SODIUM CHLORIDE 500 ML: 0.9 INJECTION, SOLUTION INTRAVENOUS at 21:04

## 2024-09-10 RX ADMIN — SODIUM CHLORIDE 100 ML/HR: 0.9 INJECTION, SOLUTION INTRAVENOUS at 21:52

## 2024-09-10 RX ADMIN — SODIUM CHLORIDE 1000 ML: 0.9 INJECTION, SOLUTION INTRAVENOUS at 17:56

## 2024-09-10 RX ADMIN — POTASSIUM CHLORIDE 40 MEQ: 1500 TABLET, EXTENDED RELEASE ORAL at 21:04

## 2024-09-10 RX ADMIN — OXYCODONE HYDROCHLORIDE 5 MG: 5 TABLET ORAL at 22:04

## 2024-09-10 NOTE — TELEPHONE ENCOUNTER
----- Message from Joselyn Reyes Bahamonde, MD sent at 9/10/2024  3:51 PM EDT -----  I called the patient, I requested a direct admission

## 2024-09-10 NOTE — ED PROVIDER NOTES
1. Hypercalcemia      ED Disposition       ED Disposition   Admit    Condition   Stable    Date/Time   Tue Sep 10, 2024  6:40 PM    Comment   Case was discussed with Dr. Sousa and the patient's admission status was agreed to be Admission Status: inpatient status to the service of Dr. Sousa .               Assessment & Plan       Medical Decision Making  Background: 58 y.o. female presents with hypercalcemia    Differential DX includes but is not limited to: parathyroid disease, bone demineralization, less likely metastatic disease, renal disease    Plan: cbc, cmp, ionized calcium, phosphorous, magnesium, PtH, IVF, admission      Amount and/or Complexity of Data Reviewed  Labs: ordered.    Risk  Decision regarding hospitalization.                Medications   sodium chloride 0.9 % bolus 1,000 mL (1,000 mL Intravenous New Bag 9/10/24 7195)       History of Present Illness       Danielle is a 58 y.o. female who presents for evaluation and treatment due to an elevated calcium level identified on outpatient bloodwork.  Her history is significant for colon cancer (treated, in remission), sarcoidosis, hypertension and hepatic disease.  Dr. Judge with nephrology referred her for admission for IV hydration.  She reports that she has had some fatigue and occasional dizziness.  No bone pain, biliary or ureterolithiasis and no altered mental status.   She has a history of elevated calcium in the past but not to this extent.       History provided by:  Patient and medical records   used: No    Medical Problem  Associated symptoms: fatigue    Associated symptoms: no fever          Review of Systems   Constitutional:  Positive for fatigue. Negative for chills and fever.   Neurological:  Positive for dizziness (occasional).       Physical Exam  Vitals and nursing note reviewed.   Constitutional:       General: She is not in acute distress.     Appearance: She is well-developed.   HENT:      Head: Normocephalic  and atraumatic.   Eyes:      Pupils: Pupils are equal, round, and reactive to light.   Neck:      Vascular: No JVD.   Cardiovascular:      Rate and Rhythm: Normal rate and regular rhythm.      Heart sounds: Normal heart sounds. No murmur heard.     No friction rub. No gallop.   Pulmonary:      Effort: Pulmonary effort is normal. No respiratory distress.      Breath sounds: Normal breath sounds. No wheezing or rales.   Chest:      Chest wall: No tenderness.   Musculoskeletal:         General: No tenderness. Normal range of motion.      Cervical back: Normal range of motion.   Skin:     General: Skin is warm and dry.   Neurological:      General: No focal deficit present.      Mental Status: She is alert and oriented to person, place, and time.   Psychiatric:         Behavior: Behavior normal.         Thought Content: Thought content normal.         Judgment: Judgment normal.           Objective     ED Triage Vitals [09/10/24 1651]   Temperature Pulse Blood Pressure Respirations SpO2 Patient Position - Orthostatic VS   98.4 °F (36.9 °C) 84 130/60 18 98 % Sitting      Temp Source Heart Rate Source BP Location FiO2 (%) Pain Score    Oral Monitor Left arm -- --            Labs Reviewed   CALCIUM, IONIZED - Abnormal; Notable for the following components:       Result Value    Calcium, Ionized 1.67 (*)     All other components within normal limits   CBC AND DIFFERENTIAL - Abnormal; Notable for the following components:    WBC 3.40 (*)     RBC 3.24 (*)     Hemoglobin 9.3 (*)     Hematocrit 28.5 (*)     Lymphocytes % 8 (*)     Eosinophils Relative 9 (*)     Absolute Lymphocytes 0.26 (*)     All other components within normal limits   BASIC METABOLIC PANEL - Abnormal; Notable for the following components:    Sodium 133 (*)     Creatinine 1.43 (*)     Calcium 13.4 (*)     All other components within normal limits    Narrative:     National Kid[employer] Foundation guidelines for Chronic Kidney Disease (CKD):     Stage 1 with  normal or high GFR (GFR > 90 mL/min/1.73 square meters)    Stage 2 Mild CKD (GFR = 60-89 mL/min/1.73 square meters)    Stage 3A Moderate CKD (GFR = 45-59 mL/min/1.73 square meters)    Stage 3B Moderate CKD (GFR = 30-44 mL/min/1.73 square meters)    Stage 4 Severe CKD (GFR = 15-29 mL/min/1.73 square meters)    Stage 5 End Stage CKD (GFR <15 mL/min/1.73 square meters)  Note: GFR calculation is accurate only with a steady state creatinine   MAGNESIUM - Abnormal; Notable for the following components:    Magnesium 1.7 (*)     All other components within normal limits   PHOSPHORUS - Normal   PTH, INTACT     No orders to display       Procedures       Al Le MD  09/10/24 9135

## 2024-09-10 NOTE — PROGRESS NOTES
With calcium 13.2 with THOR with creatinine of 1.3.  Recommend to direct admission for IV hydration

## 2024-09-10 NOTE — TELEPHONE ENCOUNTER
Spoke with patients  regarding her high calcium and Dr. Reyes note:    Please let patient know that her calcium is very high and she needs to go to the ER for IV hydration.     He verbalized understanding. She is going to [facility] ED right now.

## 2024-09-10 NOTE — TELEPHONE ENCOUNTER
----- Message from Joselyn Reyes Bahamonde, MD sent at 9/10/2024  3:44 PM EDT -----  Raza Ramsey please let patient know that her calcium is very high and she needs to go to the ER for IV hydration.

## 2024-09-10 NOTE — ASSESSMENT & PLAN NOTE
Patient has history of rectal cancer status post diverting loop ileostomy on 8/17/2023 currently in remission.  Complicated by ureteral strictures.  Patient was tentatively due for ileostomy reversal on 9/26/2024.  Prior to that she will have flexible sigmoidoscopy to check anastomosis.

## 2024-09-10 NOTE — H&P
H&P - Hospitalist   Name: Danielle Haque 58 y.o. female I MRN: [id number]  Unit/Bed#: W -01 I Date of Admission: 9/10/2024   Date of Service: 9/10/2024 I Hospital Day: 0     Assessment & Plan  Hypercalcemia  -Patient noted to have elevated calcium up to 13.2 on outpatient labs.  -Sent to [facility] for management of hypercalcemia.  -Endorses frequent urination and thirst.  Endorses fatigue and loss of appetite.  Denies abdominal pain, headaches, bone pain.  She does have history of kidney stones as well.  -Patient with history of biopsy-proven sarcoidosis which was managed with prednisone.  -Ddx: Hypercalcemia likely in the setting of sarcoidosis flareup.    Plan:  -IV sodium chloride at 100 cc/h.  -Follow-up BMP in a.m  -Nephrology consulted.  -Consider bisphosphonate therapy for management of hypercalcemia to prevent use of steroids so patient is able to undergo surgery on 9/26 since she has had multiple delays in the past.    Rectal cancer (HCC)  Patient has history of rectal cancer status post diverting loop ileostomy on 8/17/2023 currently in remission.  Complicated by ureteral strictures.  Patient was tentatively due for ileostomy reversal on 9/26/2024.  Prior to that she will have flexible sigmoidoscopy to check anastomosis.  Hypertension  -Continue amlodipine 10 mg.  Sarcoidosis  -History of biopsy proven sarcoidosis in the lung and liver.    -Follows with Dr. Machuac (GI) in the outpatient setting.    -Currently on ursodiol 900 mg daily.  -She was also on chronic prednisone which was tapered 2 weeks ago for her to undergo ileostomy reversal   surgery.     Plan:  -Continue ursodiol 90 mg daily.  -Recommend endo consult for hypercalcemia management in the setting of sarcoidosis.    VTE Pharmacologic Prophylaxis: VTE Score: 3 Moderate Risk (Score 3-4) - Pharmacological DVT Prophylaxis Ordered: heparin.  Code Status: Level 1 - Full Code   Discussion with family: Updated  (Mateo) via  phone.    Anticipated Length of Stay: Patient will be admitted on an inpatient basis with an anticipated length of stay of greater than 2 midnights secondary to IV hydration.    History of Present Illness   Chief Complaint: Hypercalcemia    Danielle Haque is a 58 y.o. female with a PMH of hypertension, NOLVIA, pulmonary and hepatic sarcoidosis, adrenal nodule, stage IIIa CKD and colon cancer s/p diverting ileostomy in 2023 in remission who presents with hypercalcemia on outpatient labs.  Patient states that she had chest pain on Saturday and had reached out to her nephrologist who recommended patient get lab work done on which it was noted that she has hypercalcemia up to 13.2.  Patient was sent to the [facility] for direct admission for management of hypercalcemia with IV fluids.  Patient endorses dysuria, urinary frequency and urgency.  She has normal b bowel movements bowels regularly.  Denies any chest pain, abdominal pain, nausea, vomiting, diarrhea, myalgias.  Denies any history of smoking or alcohol use.    Review of Systems   Constitutional:  Negative for chills and fever.   HENT:  Negative for ear pain and sore throat.    Eyes:  Negative for pain and visual disturbance.   Respiratory:  Negative for cough and shortness of breath.    Cardiovascular:  Negative for chest pain and palpitations.   Gastrointestinal:  Negative for abdominal pain and vomiting.   Genitourinary:  Positive for frequency and urgency. Negative for dysuria and hematuria.   Musculoskeletal:  Negative for arthralgias and back pain.   Skin:  Negative for color change and rash.   Neurological:  Negative for seizures and syncope.   All other systems reviewed and are negative.        Social History:  Marital Status:    Occupation:   Patient Pre-hospital Living Situation: Home  Patient Pre-hospital Level of Mobility: walks  Patient Pre-hospital Diet Restrictions:     Objective     Vitals:   Blood Pressure: 136/73 (09/10/24  "2042)  Pulse: 87 (09/10/24 2042)  Temperature: 98.2 °F (36.8 °C) (09/10/24 2042)  Temp Source: Oral (09/10/24 1651)  Respirations: 12 (09/10/24 2042)  Height: 5' 2\" (157.5 cm) (09/10/24 2045)  Weight - Scale: 54.9 kg (121 lb) (09/10/24 2045)  SpO2: 97 % (09/10/24 2042)    Physical Exam  Constitutional:       General: She is not in acute distress.     Appearance: Normal appearance.   HENT:      Nose: Nose normal.      Mouth/Throat:      Mouth: Mucous membranes are dry.      Pharynx: Oropharynx is clear.   Eyes:      Conjunctiva/sclera: Conjunctivae normal.      Pupils: Pupils are equal, round, and reactive to light.   Cardiovascular:      Rate and Rhythm: Normal rate and regular rhythm.      Heart sounds: Normal heart sounds.   Pulmonary:      Effort: No respiratory distress.      Breath sounds: Normal breath sounds. No wheezing.   Chest:      Comments: Port on the right side of the chest  Abdominal:      Palpations: Abdomen is soft.      Tenderness: There is no abdominal tenderness.      Comments: Colostomy bag present   Musculoskeletal:         General: No swelling.      Cervical back: No rigidity.   Skin:     General: Skin is warm and dry.   Neurological:      Mental Status: She is alert and oriented to person, place, and time.   Psychiatric:         Mood and Affect: Mood normal.         Behavior: Behavior normal.         Lines/Drains:  Lines/Drains/Airways       Active Status       Name Placement date Placement time Site Days    Port A Cath 03/21/23 Right Internal jugular 03/21/23  1022  Internal jugular  539    Ileostomy Loop RLQ 08/17/23  1140  RLQ  390    Ureteral Internal Stent Left ureter 6 Fr. 07/21/23  0946  Left ureter  417    Ureteral Internal Stent Right ureter 7 Fr. 07/21/23  1004  Right ureter  417    Ureteral Internal Stent Right ureter 7 Fr. 10/11/23  1505  Right ureter  335    Ureteral Internal Stent Left ureter 6 Fr. 05/10/24  1236  Left ureter  123    Ureteral Internal Stent Right ureter 6 Fr. " 05/10/24  1241  Right ureter  123                    Central Line:  Goal for removal: Port accessed. Will de-access as appropriate.           Additional Data:   Lab Results: I have reviewed the following results:   Results from last 7 days   Lab Units 09/10/24  1722   WBC Thousand/uL 3.40*   HEMOGLOBIN g/dL 9.3*   HEMATOCRIT % 28.5*   PLATELETS Thousands/uL 192   SEGS PCT % 74   LYMPHO PCT % 8*   MONO PCT % 9   EOS PCT % 9*     Results from last 7 days   Lab Units 09/10/24  1722   SODIUM mmol/L 133*   POTASSIUM mmol/L 3.8   CHLORIDE mmol/L 102   CO2 mmol/L 26   BUN mg/dL 25   CREATININE mg/dL 1.43*   ANION GAP mmol/L 5   CALCIUM mg/dL 13.4*   ALBUMIN g/dL 3.6   TOTAL BILIRUBIN mg/dL 0.73   ALK PHOS U/L 83   ALT U/L 41   AST U/L 51*   GLUCOSE RANDOM mg/dL 125             Lab Results   Component Value Date    HGBA1C 6.3 (H) 06/30/2023    HGBA1C 6.0 (H) 03/13/2023    HGBA1C 6.4 (H) 12/26/2022           Imaging Review: No pertinent imaging studies reviewed.  Other Studies: No additional pertinent studies reviewed.    Administrative Statements   Topics discussed with the patient / family include symptom assessment and management, medication review, medication adjustment, and psychosocial support.    ** Please Note: This note has been constructed using a voice recognition system. **

## 2024-09-11 PROBLEM — D61.818 PANCYTOPENIA (HCC): Status: ACTIVE | Noted: 2023-02-15

## 2024-09-11 PROBLEM — R79.89 ELEVATED SERUM CREATININE: Status: ACTIVE | Noted: 2024-09-11

## 2024-09-11 PROBLEM — R79.89 ELEVATED SERUM CREATININE: Status: RESOLVED | Noted: 2024-09-11 | Resolved: 2024-09-11

## 2024-09-11 PROBLEM — E87.6 HYPOKALEMIA: Status: RESOLVED | Noted: 2022-12-25 | Resolved: 2024-09-11

## 2024-09-11 LAB
ANION GAP SERPL CALCULATED.3IONS-SCNC: 5 MMOL/L (ref 4–13)
BACTERIA UR QL AUTO: ABNORMAL /HPF
BILIRUB UR QL STRIP: NEGATIVE
BUN SERPL-MCNC: 19 MG/DL (ref 5–25)
CALCIUM SERPL-MCNC: 11.6 MG/DL (ref 8.4–10.2)
CHLORIDE SERPL-SCNC: 110 MMOL/L (ref 96–108)
CLARITY UR: ABNORMAL
CO2 SERPL-SCNC: 25 MMOL/L (ref 21–32)
COLOR UR: ABNORMAL
CREAT SERPL-MCNC: 1.2 MG/DL (ref 0.6–1.3)
ERYTHROCYTE [DISTWIDTH] IN BLOOD BY AUTOMATED COUNT: 12.9 % (ref 11.6–15.1)
EST. AVERAGE GLUCOSE BLD GHB EST-MCNC: 103 MG/DL
GFR SERPL CREATININE-BSD FRML MDRD: 50 ML/MIN/1.73SQ M
GLUCOSE SERPL-MCNC: 83 MG/DL (ref 65–140)
GLUCOSE UR STRIP-MCNC: NEGATIVE MG/DL
HBA1C MFR BLD: 5.2 %
HCT VFR BLD AUTO: 28.2 % (ref 34.8–46.1)
HGB BLD-MCNC: 9.1 G/DL (ref 11.5–15.4)
HGB UR QL STRIP.AUTO: ABNORMAL
KETONES UR STRIP-MCNC: NEGATIVE MG/DL
LEUKOCYTE ESTERASE UR QL STRIP: ABNORMAL
MCH RBC QN AUTO: 28.5 PG (ref 26.8–34.3)
MCHC RBC AUTO-ENTMCNC: 32.3 G/DL (ref 31.4–37.4)
MCV RBC AUTO: 88 FL (ref 82–98)
NITRITE UR QL STRIP: NEGATIVE
NON-SQ EPI CELLS URNS QL MICRO: ABNORMAL /HPF
PH UR STRIP.AUTO: 6 [PH]
PLATELET # BLD AUTO: 183 THOUSANDS/UL (ref 149–390)
PMV BLD AUTO: 9.6 FL (ref 8.9–12.7)
POTASSIUM SERPL-SCNC: 3.7 MMOL/L (ref 3.5–5.3)
PROT UR STRIP-MCNC: ABNORMAL MG/DL
PTH-INTACT SERPL-MCNC: 4.6 PG/ML (ref 12–88)
RBC # BLD AUTO: 3.19 MILLION/UL (ref 3.81–5.12)
RBC #/AREA URNS AUTO: ABNORMAL /HPF
SODIUM SERPL-SCNC: 140 MMOL/L (ref 135–147)
SP GR UR STRIP.AUTO: 1.01 (ref 1–1.03)
UROBILINOGEN UR STRIP-ACNC: <2 MG/DL
WBC # BLD AUTO: 1.99 THOUSAND/UL (ref 4.31–10.16)
WBC #/AREA URNS AUTO: ABNORMAL /HPF

## 2024-09-11 PROCEDURE — 99232 SBSQ HOSP IP/OBS MODERATE 35: CPT | Performed by: INTERNAL MEDICINE

## 2024-09-11 PROCEDURE — 81001 URINALYSIS AUTO W/SCOPE: CPT

## 2024-09-11 PROCEDURE — 83970 ASSAY OF PARATHORMONE: CPT

## 2024-09-11 PROCEDURE — 99255 IP/OBS CONSLTJ NEW/EST HI 80: CPT | Performed by: INTERNAL MEDICINE

## 2024-09-11 PROCEDURE — 82652 VIT D 1 25-DIHYDROXY: CPT

## 2024-09-11 PROCEDURE — 85027 COMPLETE CBC AUTOMATED: CPT

## 2024-09-11 PROCEDURE — 80048 BASIC METABOLIC PNL TOTAL CA: CPT

## 2024-09-11 PROCEDURE — 87086 URINE CULTURE/COLONY COUNT: CPT

## 2024-09-11 RX ORDER — CLOTRIMAZOLE 1 %
1 CREAM WITH APPLICATOR VAGINAL
Status: CANCELLED | OUTPATIENT
Start: 2024-09-11

## 2024-09-11 RX ADMIN — POTASSIUM CHLORIDE 40 MEQ: 1500 TABLET, EXTENDED RELEASE ORAL at 17:03

## 2024-09-11 RX ADMIN — FENOFIBRATE 145 MG: 145 TABLET ORAL at 08:25

## 2024-09-11 RX ADMIN — SODIUM CHLORIDE 100 ML/HR: 0.9 INJECTION, SOLUTION INTRAVENOUS at 08:29

## 2024-09-11 RX ADMIN — OXYBUTYNIN CHLORIDE 5 MG: 5 TABLET ORAL at 21:55

## 2024-09-11 RX ADMIN — AMLODIPINE BESYLATE 10 MG: 10 TABLET ORAL at 08:25

## 2024-09-11 RX ADMIN — CYANOCOBALAMIN TAB 500 MCG 1000 MCG: 500 TAB at 08:25

## 2024-09-11 RX ADMIN — HEPARIN SODIUM 5000 UNITS: 5000 INJECTION INTRAVENOUS; SUBCUTANEOUS at 05:27

## 2024-09-11 RX ADMIN — OXYCODONE HYDROCHLORIDE 5 MG: 5 TABLET ORAL at 15:41

## 2024-09-11 RX ADMIN — OXYBUTYNIN CHLORIDE 5 MG: 5 TABLET ORAL at 15:41

## 2024-09-11 RX ADMIN — OXYCODONE HYDROCHLORIDE 5 MG: 5 TABLET ORAL at 20:33

## 2024-09-11 RX ADMIN — HEPARIN SODIUM 5000 UNITS: 5000 INJECTION INTRAVENOUS; SUBCUTANEOUS at 15:41

## 2024-09-11 RX ADMIN — OXYCODONE HYDROCHLORIDE 5 MG: 5 TABLET ORAL at 02:20

## 2024-09-11 RX ADMIN — POTASSIUM CHLORIDE 40 MEQ: 1500 TABLET, EXTENDED RELEASE ORAL at 08:24

## 2024-09-11 RX ADMIN — OXYCODONE HYDROCHLORIDE 5 MG: 5 TABLET ORAL at 08:35

## 2024-09-11 RX ADMIN — FERROUS SULFATE TAB 325 MG (65 MG ELEMENTAL FE) 325 MG: 325 (65 FE) TAB at 08:25

## 2024-09-11 RX ADMIN — SODIUM CHLORIDE 100 ML/HR: 0.9 INJECTION, SOLUTION INTRAVENOUS at 18:38

## 2024-09-11 RX ADMIN — OXYBUTYNIN CHLORIDE 5 MG: 5 TABLET ORAL at 05:27

## 2024-09-11 RX ADMIN — TAMSULOSIN HYDROCHLORIDE 0.4 MG: 0.4 CAPSULE ORAL at 15:41

## 2024-09-11 RX ADMIN — URSODIOL 900 MG: 300 CAPSULE ORAL at 08:25

## 2024-09-11 NOTE — ASSESSMENT & PLAN NOTE
-- Blood pressure currently stable and controlled  --Continue amlodipine 10 mg daily  --Continue normal saline

## 2024-09-11 NOTE — CONSULTS
NEPHROLOGY HOSPITAL CONSULTATION   Danielle Haque 58 y.o. female MRN: [id number]  Unit/Bed#: W -01 Encounter: [id number]    Brief History of Admission -58-year-old female with a history of chronic kidney disease stage IIIA, hypertension, hepatic sarcoidosis, adrenal nodule, colon cancer status post diverting ileostomy who presented as a direct admission for hypercalcemia.    Assessment & Plan  Hypercalcemia  -- Recurrent issue  -- Present on admission, calcium was 13.2 peaking at 13.4  -- Ionized calcium level high at 1.67  -- PTH is suppressed, less likely a parathyroid issue  -- Check serum protein electrophoresis, check PTH related peptide, check vitamin D 1, 25 dihydroxy level given her history of sarcoidosis  -- Angiotensin-converting enzyme from last month was elevated at 117 and worsening check a repeat angiotensin-converting enzyme.  -- Had been on steroids in the past currently off steroids.  -- Total serum calcium level is improving 11.6 with normal saline administration.  -- Trend daily ionized calcium levels  -- If the vitamin D 1, 25 dihydroxy level is elevated, we may need to consider reinitiation of prednisone, to inhibit activity of 1 alpha hydroxylase    Hypertension  -- Blood pressure currently stable and controlled  --Continue amlodipine 10 mg daily  --Continue normal saline  Rectal cancer (HCC)  -- Follows with colorectal surgery and oncology  -- Status post robotic low anterior resection low pelvic anastomosis colorectal anastomosis stapled left-sided loop colostomy reversal with a diverting loop ileostomy on August 17, 2023 negative margins.    Elevated serum creatinine (Resolved: 9/11/2024)  -- Creatinine peaked at 1.43 mg/dL, likely in setting of hypercalcemia causing renal vasoconstriction  -- Renal function improved creatinine back to baseline at 1.2 mg/dL    Sarcoidosis  -- Liver biopsy proven nonnecrotizing granulomas consistent with hepatic involvement of her sarcoidosis.  --  "Currently off prednisone  -- Prednisone in the past, along with UDCA but has had recrudescence in ALP and ACE levels have been rising  -- Currently on fenofibrate  -- Being managed by GI as an outpatient and pulmonary    CKD stage G3a/A2, GFR 45-59 and albumin creatinine ratio  mg/g ([facility])  Lab Results   Component Value Date    EGFR 50 09/11/2024    EGFR 40 09/10/2024    EGFR 44 09/10/2024    CREATININE 1.20 09/11/2024    CREATININE 1.43 (H) 09/10/2024    CREATININE 1.34 (H) 09/10/2024   -- Outpatient nephrologist Dr. Reyes  -- Baseline creatinine low 1's  -- History of bilateral hydronephrosis.  -- CT scan showed improved right-sided hydronephrosis.  Double-J right ureteral stent.  Persistent mild left-sided hydroureteronephrosis with a double-J ureteral stent    I have reviewed the nephrology recommendations including assessment and plan, with patient and internal medicine team, and we are in agreement with renal plan including the information outlined above.    Portions of the record may have been created with voice recognition software. Occasional wrong word or \"sound a like\" substitutions may have occurred due to the inherent limitations of voice recognition software. Read the chart carefully and recognize, using context, where substitutions have occurred.If you have any questions, please contact the dictating provider.    HISTORY OF PRESENT ILLNESS:  Requesting Physician: Malcolm Copeland MD  Reason for Consult: Hypercalcemia and elevated creatinine    Danielle Haque is a 58 y.o. female who was admitted to [facility] after presenting with direct admission for hypercalcemia. A renal consultation is requested today for assistance in the management of hypercalcemia and elevated creatinine.  Patient follows my colleague Dr. Reyes due to history of THOR and underlying CKD stage III with a baseline creatinine of low ones.  She had outpatient blood work showing an elevated serum calcium level " more than 13 and was directly admitted.  Started intravenous fluids.  She has a history of hepatic sarcoidosis have been treated by steroids in the past currently off.  Calcium level is improving.    PAST MEDICAL HISTORY:  Past Medical History:   Diagnosis Date    Bleeding from colostomy stoma (HCC)      states ileostomy , not colostomy    Cancer (HCC)     Colon cancer (HCC)     Fall     Headache     Hemochromatosis, unspecified     History of transfusion     2022 - no adverse reaction    Hypertension     Kidney calculi     Kidney stone     Liver disease     hemangioma    Muscle weakness     Personal history of COVID-19 12/2022    Sarcoidosis     Seizures (HCC)     2022    Shingles        PAST SURGICAL HISTORY:  Past Surgical History:   Procedure Laterality Date    ABSCESS DRAINAGE      left breast    BREAST BIOPSY      CHEST TUBE INSERTION      COLON SURGERY      colostomy    COLONOSCOPY      FL GUIDED CENTRAL VENOUS ACCESS DEVICE INSERTION  03/21/2023    FL RETROGRADE PYELOGRAM  01/23/2023    FL RETROGRADE PYELOGRAM  04/03/2023    FL RETROGRADE PYELOGRAM  7/21/2023    FL RETROGRADE PYELOGRAM  10/11/2023    FL RETROGRADE PYELOGRAM  12/15/2023    FL RETROGRADE PYELOGRAM  2/8/2024    FL RETROGRADE PYELOGRAM  5/10/2024    IR BIOPSY LIVER MASS  05/09/2023    LUNG BIOPSY      ME CYSTO BLADDER W/URETERAL CATHETERIZATION Bilateral 07/21/2023    Procedure: CYSTOSCOPY URETEROSCOPY RETROGRADE PYELOGRAM WITH BILATERAL STENT URETERAL EXCHANGE; LEFT LASER LITHOTRIPSY AND STONE BASKET RETRIEVAL;  Surgeon: José Luis Stephens MD;  [facility];  Service: Urology    ME CYSTO BLADDER W/URETERAL CATHETERIZATION Bilateral 12/15/2023    Procedure: CYSTOSCOPY, BILATERAL  RETROGRADE PYELOGRAM , STENT EXCHANGE RIGHT , LEFT URETEROSCOPY ,  HOLMIUM LASER WITH INSERTION LEFT URETERAL STENT;  Surgeon: Derick Bhakta MD;  [facility];  Service: Urology    ME CYSTO BLADDER W/URETERAL CATHETERIZATION Left  2/8/2024    Procedure: CYSTOSCOPY B/L RETROGRADE PYELOGRAM WITH B/L T URETERALSTENT EXCHANGE,LEFT URETEROSCOPY, DILATION LEFT URETERAL STICTURE;  Surgeon: José Luis Stephens MD;  [facility];  Service: Urology    CO CYSTO BLADDER W/URETERAL CATHETERIZATION Bilateral 5/10/2024    Procedure: CYSTOSCOPY RETROGRADE PYELOGRAM WITH INSERTION STENT URETERAL;  Surgeon: José Lius Stephens MD;  [facility];  Service: Urology    CO CYSTO W/INSERT URETERAL STENT Bilateral 5/10/2024    Procedure: EXCHANGE STENT URETERAL;  Surgeon: José Luis Stephens MD;  [facility];  Service: Urology    CO CYSTO/URETERO W/LITHOTRIPSY &INDWELL STENT INSRT Bilateral 01/23/2023    Procedure: CYSTOSCOPY  RIGHT URETEROSCOPY WITH LITHOTRIPSY HOLMIUM LASER, BILATERAL RETROGRADE PYELOGRAM AND BILATERAL  URETERAL STENT INSERTION;  Surgeon: José Luis Stephens MD;  [facility];  Service: Urology    CO CYSTO/URETERO W/LITHOTRIPSY &INDWELL STENT INSRT Right 04/03/2023    Procedure: RIGHT URETEROSCOPY WITH LITHOTRIPSY HOLMIUM LASER, RETROGRADE PYELOGRAM; BILATERAL EXCHANGE STENT URETERAL;  Surgeon: José Luis Stephens MD;  [facility];  Service: Urology    CO CYSTO/URETERO W/LITHOTRIPSY &INDWELL STENT INSRT Bilateral 10/11/2023    Procedure: CYSTOSCOPY URETEROSCOPY RETROGRADE PYELOGRAM AND INSERTION STENT URETERAL DIAGNOSTINC RIGHT URETEROSCOPY, REMOVAL STENT LEFT SIDE;  Surgeon: José Luis Stephens MD;  [facility];  Service: Urology    CO INSJ TUNNELED CTR VAD W/SUBQ PORT AGE 5 YR/> N/A 03/21/2023    Procedure: INSERTION VENOUS PORT ( PORT-A-CATH) IR;  Surgeon: Mark Amos DO;  [facility];  Service: Interventional Radiology    CO LAPS COLECTOMY PRTL W/COLOPXTSTMY LW ANAST N/A 8/17/2023    Procedure: RESECTION COLON LOW ANTERIOR LAPAROSCOPIC WITH ROBOTICS;  Surgeon: Sree Umanzor MD;  [facility];  Service: Colorectal    TONSILLECTOMY      URINARY  "SURGERY Bilateral     bilateral stents       ALLERGIES:  Allergies   Allergen Reactions    Oxaliplatin Shortness Of Breath     Reactions occurred with 2nd and 3rd infusions (about 1-3 hours from initiation of infusion) and required treatment with steroids and antihistamines. Please refer to allergy note on 2/7/2023 for detailed description of her reactions.    Morphine Nausea Only     \"Every dose made me nauseous\" (oral dosing only, can tolerate IV morphine)    Potassium Chloride Other (See Comments)     Pt reports \"burning\" with Iv potassium administration and wishes for it to be added to chart       SOCIAL HISTORY:  Social History     Substance and Sexual Activity   Alcohol Use Never     Social History     Substance and Sexual Activity   Drug Use Never     Social History     Tobacco Use   Smoking Status Never    Passive exposure: Past   Smokeless Tobacco Never       FAMILY HISTORY:  Family History   Problem Relation Age of Onset    Cancer Mother     Cirrhosis Father     Breast cancer Neg Hx     Breast cancer additional onset Neg Hx        MEDICATIONS:    Current Facility-Administered Medications:     acetaminophen (TYLENOL) tablet 650 mg, 650 mg, Oral, Q6H PRN, Lilliam Teixeira MD    amLODIPine (NORVASC) tablet 10 mg, 10 mg, Oral, Daily, Lilliam Teixeira MD, 10 mg at 09/11/24 0825    cyanocobalamin (VITAMIN B-12) tablet 1,000 mcg, 1,000 mcg, Oral, Daily, Lilliam Teixeira MD, 1,000 mcg at 09/11/24 0825    fenofibrate (TRICOR) tablet 145 mg, 145 mg, Oral, Daily, Lilliam Teixeira MD, 145 mg at 09/11/24 0825    ferrous sulfate tablet 325 mg, 325 mg, Oral, Daily With Breakfast, Lilliam Teixeira MD, 325 mg at 09/11/24 0825    heparin (porcine) subcutaneous injection 5,000 Units, 5,000 Units, Subcutaneous, Q8H STANLEY, 5,000 Units at 09/11/24 0527 **AND** [COMPLETED] Platelet count, , , Once, Lilliam Teixeira MD    ondansetron (ZOFRAN) injection 4 mg, 4 mg, Intravenous, Q6H PRN, Lilliam Teixeira MD    oxybutynin (DITROPAN) tablet 5 mg, 5 mg, " "Oral, Q6H PRN, Lilliam Teixeira MD, 5 mg at 09/11/24 0527    oxyCODONE (ROXICODONE) IR tablet 5 mg, 5 mg, Oral, Q4H PRN, Lilliam Teixeira MD, 5 mg at 09/11/24 0835    potassium chloride (Klor-Con M20) CR tablet 40 mEq, 40 mEq, Oral, BID, Lilliam Teixeira MD, 40 mEq at 09/11/24 0824    sodium chloride 0.9 % infusion, 100 mL/hr, Intravenous, Continuous, Lilliam Teixeira MD, Last Rate: 100 mL/hr at 09/11/24 0829, 100 mL/hr at 09/11/24 0829    tamsulosin (FLOMAX) capsule 0.4 mg, 0.4 mg, Oral, Daily With Dinner, Lilliam Teixeira MD    ursodiol (ACTIGALL) capsule 900 mg, 900 mg, Oral, Daily, Lilliam Teixeira MD, 900 mg at 09/11/24 0825    REVIEW OF SYSTEMS:  Constitutional: Negative for fatigue, anorexia, fever, chills, diaphoresis  HENT: Negative for postnasal drip  Eyes: Negative for visual disturbance.   Respiratory: Negative for cough, shortness of breath and wheezing.   Cardiovascular: Negative for chest pain, palpitations and leg swelling.   Gastrointestinal: Negative for abdominal pain, constipation, diarrhea, nausea and vomiting.   Genitourinary: No dysuria, hematuria  Endocrine: Negative for polyuria.   Musculoskeletal: Negative for arthralgias, back pain and joint swelling.   Skin: Negative for rash.   Neurological: Negative for focal weakness, headaches, dizziness.  Hematological: Negative for easy bruising or bleeding.  Psychiatric/Behavioral: Negative for confusion and sleep disturbance.   All the systems were reviewed and were negative except as documented on the HPI.    PHYSICAL EXAM:  Current Weight: Weight - Scale: 54.9 kg (121 lb)  First Weight: Weight - Scale: 55.5 kg (122 lb 5.7 oz)  Vitals:    09/10/24 2042 09/10/24 2045 09/10/24 2243 09/11/24 0719   BP: 136/73  132/71 129/70   BP Location:       Pulse: 87  89 84   Resp: 12  12 16   Temp: 98.2 °F (36.8 °C)  98.1 °F (36.7 °C) 98.1 °F (36.7 °C)   TempSrc:    Oral   SpO2: 97%  94% 93%   Weight:  54.9 kg (121 lb)     Height:  5' 2\" (1.575 m)         Intake/Output Summary " (Last 24 hours) at 9/11/2024 1204  Last data filed at 9/11/2024 0900  Gross per 24 hour   Intake 180 ml   Output 1225 ml   Net -1045 ml     Physical Exam  Vitals and nursing note reviewed.   Constitutional:       General: She is not in acute distress.     Appearance: She is well-developed.   HENT:      Head: Normocephalic and atraumatic.   Eyes:      General: No scleral icterus.     Conjunctiva/sclera: Conjunctivae normal.      Pupils: Pupils are equal, round, and reactive to light.   Cardiovascular:      Rate and Rhythm: Normal rate and regular rhythm.      Heart sounds: S1 normal and S2 normal. No murmur heard.     No friction rub. No gallop.   Pulmonary:      Effort: Pulmonary effort is normal. No respiratory distress.      Breath sounds: Normal breath sounds. No wheezing or rales.   Abdominal:      General: Bowel sounds are normal.      Palpations: Abdomen is soft.      Tenderness: There is no abdominal tenderness. There is no rebound.   Musculoskeletal:         General: Normal range of motion.      Cervical back: Normal range of motion and neck supple.   Skin:     Findings: No rash.   Neurological:      Mental Status: She is alert and oriented to person, place, and time.   Psychiatric:         Behavior: Behavior normal.           Invasive Devices:      Lab Results:   Results from last 7 days   Lab Units 09/11/24  0537 09/10/24  2225 09/10/24  1722 09/10/24  1305   WBC Thousand/uL 1.99*  --  3.40*  --    HEMOGLOBIN g/dL 9.1*  --  9.3*  --    HEMATOCRIT % 28.2*  --  28.5*  --    PLATELETS Thousands/uL 183 203 192  --    POTASSIUM mmol/L 3.7  --  3.8 4.2   CHLORIDE mmol/L 110*  --  102 102   CO2 mmol/L 25  --  26 26   BUN mg/dL 19  --  25 24   CREATININE mg/dL 1.20  --  1.43* 1.34*   CALCIUM mg/dL 11.6*  --  13.4* 13.2*   MAGNESIUM mg/dL  --   --  1.7*  --    PHOSPHORUS mg/dL  --   --  3.9  --    ALK PHOS U/L  --   --  83  --    ALT U/L  --   --  41  --    AST U/L  --   --  51*  --

## 2024-09-11 NOTE — ASSESSMENT & PLAN NOTE
-- Recurrent issue  -- Present on admission, calcium was 13.2 peaking at 13.4  -- Ionized calcium level high at 1.67  -- PTH is suppressed, less likely a parathyroid issue  -- Check serum protein electrophoresis, check PTH related peptide, check vitamin D 1, 25 dihydroxy level given her history of sarcoidosis  -- Angiotensin-converting enzyme from last month was elevated at 117 and worsening check a repeat angiotensin-converting enzyme.  -- Had been on steroids in the past currently off steroids.  -- Total serum calcium level is improving 11.6 with normal saline administration.  -- Trend daily ionized calcium levels  -- If the vitamin D 1, 25 dihydroxy level is elevated, we may need to consider reinitiation of prednisone, to inhibit activity of 1 alpha hydroxylase

## 2024-09-11 NOTE — ASSESSMENT & PLAN NOTE
-History of biopsy proven sarcoidosis in the lung and liver.    -Follows with Dr. Machuca (GI) in the outpatient setting.    -Currently on ursodiol 900 mg daily.  -She was also on chronic prednisone which was tapered 2 weeks ago for her to undergo ileostomy reversal   surgery.     Plan:  -Continue ursodiol 90 mg daily.  -Recommend endo consult for hypercalcemia management in the setting of sarcoidosis.

## 2024-09-11 NOTE — ASSESSMENT & PLAN NOTE
Lab Results   Component Value Date    EGFR 50 09/11/2024    EGFR 40 09/10/2024    EGFR 44 09/10/2024    CREATININE 1.20 09/11/2024    CREATININE 1.43 (H) 09/10/2024    CREATININE 1.34 (H) 09/10/2024   -- Outpatient nephrologist Dr. Reyes  -- Baseline creatinine low 1's  -- History of bilateral hydronephrosis.  -- CT scan showed improved right-sided hydronephrosis.  Double-J right ureteral stent.  Persistent mild left-sided hydroureteronephrosis with a double-J ureteral stent

## 2024-09-11 NOTE — PROGRESS NOTES
Progress Note - Hospitalist   Name: Danielle Haque 58 y.o. female I MRN: [id number]  Unit/Bed#: W -01 I Date of Admission: 9/10/2024   Date of Service: 9/11/2024 I Hospital Day: 1    Assessment & Plan  Hypercalcemia  -Elevated calcium to 13.2 on outpatient labs.Sent to [facility] for management of hypercalcemia.  -Endorses frequent urination and thirst.  Endorses fatigue and loss of appetite. Hx of non-obstructing renal calculi.  -Patient with history of biopsy-proven sarcoidosis which was managed with prednisone.  -Ddx: Hypercalcemia likely in the setting of sarcoidosis flareup.  - Labs 9/11: Na 140 K 3.7 Ca 11.6 Cr Mg 1.7 PTH 4.7 WBC 1.99 from 3.4, Hb 9.1 from 11.1 Plt 183                      LFT: T bill .73, Direct apple 0.3 AST 83 ALT 41 Total protein 6.3, Alb 3.6    Plan:  -IV sodium chloride at 100 cc/h.  -Follow-up BMP and ionized calcium  - Check protein electrophoresis   - Nephrology consulted, appreciate recs    Rectal cancer (HCC)  Patient has history of rectal cancer status post diverting loop ileostomy on 8/17/2023 currently in remission.  Complicated by ureteral strictures.  Patient was tentatively due for ileostomy reversal on 9/26/2024.  Prior to that she will have flexible sigmoidoscopy to check anastomosis.  Hypertension  -Continue amlodipine 10 mg.  Sarcoidosis  -History of biopsy proven sarcoidosis in the lung and liver.    -Follows with Dr. Machuca (GI) in the outpatient setting.    -Currently on ursodiol 900 mg daily.  -She was also on chronic prednisone which was tapered 2 weeks ago for her to undergo ileostomy reversal   surgery.     Plan:  -Continue ursodiol 90 mg daily.  -Recommend endo consult for hypercalcemia management in the setting of sarcoidosis.  Pancytopenia (HCC)  -  C/o intermittent genitalia pain and orange-red colored urine X 4-5 months. Ddx stone renal calculi vs UTI (low suspicion d/t neg UTI symptoms)  - 09/11 labs: WBC 1.99 from 3.4 (baseline ~ 4), Hb 9.1 from  11, plt 183 from 203 - could be dilutional from fluids      Plan:  - Repeat CBC in the am  - Check UA      CKD stage G3a/A2, GFR 45-59 and albumin creatinine ratio  mg/g ([facility])  Lab Results   Component Value Date    EGFR 50 2024    EGFR 40 09/10/2024    EGFR 44 09/10/2024    CREATININE 1.20 2024    CREATININE 1.43 (H) 09/10/2024    CREATININE 1.34 (H) 09/10/2024       VTE Pharmacologic Prophylaxis: VTE Score: 3 High Risk (Score >/= 5) - Pharmacological DVT Prophylaxis Ordered: heparin. Sequential Compression Devices Ordered.    Mobility:   Basic Mobility Inpatient Raw Score: 24  JH-HLM Goal: 8: Walk 250 feet or more  JH-HLM Achieved: 6: Walk 10 steps or more  JH-HLM Goal achieved. Continue to encourage appropriate mobility.    Patient Centered Rounds: I performed bedside rounds with nursing staff today.   Discussions with Specialists or Other Care Team Provider: Nephrology    Education and Discussions with Family / Patient: Updated ex- on the phone    Current Length of Stay: 1 day(s)  Current Patient Status: Inpatient   Certification Statement:   Discharge Plan:     Code Status: Level 1 - Full Code    Subjective   C/o pain around genitalia and orange-red colored urine. Denies frequency, urgency or burning while urinating. No fresh blood or clot in urine.    No fever/chills/rigor, cp, palpitation, sob, n/v/diarrhea. Eating and sleep okay.    Objective     Vitals:   Temp (24hrs), Av.2 °F (36.8 °C), Min:98.1 °F (36.7 °C), Max:98.4 °F (36.9 °C)    Temp:  [98.1 °F (36.7 °C)-98.4 °F (36.9 °C)] 98.1 °F (36.7 °C)  HR:  [84-96] 84  Resp:  [12-18] 16  BP: (129-187)/(60-81) 129/70  SpO2:  [93 %-98 %] 93 %  Body mass index is 22.13 kg/m².     Input and Output Summary (last 24 hours):     Intake/Output Summary (Last 24 hours) at 2024 1139  Last data filed at 2024 0900  Gross per 24 hour   Intake 180 ml   Output 1225 ml   Net -1045 ml       Physical Exam  Constitutional:       Appearance:  Normal appearance. She is ill-appearing.   HENT:      Head: Normocephalic and atraumatic.   Cardiovascular:      Rate and Rhythm: Normal rate and regular rhythm.      Pulses: Normal pulses.      Heart sounds: Normal heart sounds.   Pulmonary:      Effort: Pulmonary effort is normal.   Abdominal:      General: Abdomen is flat. Bowel sounds are normal.      Palpations: Abdomen is soft.   Skin:     General: Skin is warm.      Capillary Refill: Capillary refill takes less than 2 seconds.   Neurological:      Mental Status: She is alert and oriented to person, place, and time. Mental status is at baseline.   Psychiatric:         Mood and Affect: Mood normal.        Lines/Drains:  Lines/Drains/Airways       Active Status       Name Placement date Placement time Site Days    Port A Cath 03/21/23 Right Internal jugular 03/21/23  1022  Internal jugular  540    Ileostomy Loop RLQ 08/17/23  1140  RLQ  390    Ureteral Internal Stent Left ureter 6 Fr. 07/21/23  0946  Left ureter  418    Ureteral Internal Stent Right ureter 7 Fr. 07/21/23  1004  Right ureter  418    Ureteral Internal Stent Right ureter 7 Fr. 10/11/23  1505  Right ureter  335    Ureteral Internal Stent Left ureter 6 Fr. 05/10/24  1236  Left ureter  123    Ureteral Internal Stent Right ureter 6 Fr. 05/10/24  1241  Right ureter  123                               Lab Results: I have reviewed the following results:    Results from last 7 days   Lab Units 09/11/24  0537 09/10/24  2225 09/10/24  1722   WBC Thousand/uL 1.99*  --  3.40*   HEMOGLOBIN g/dL 9.1*  --  9.3*   HEMATOCRIT % 28.2*  --  28.5*   PLATELETS Thousands/uL 183   < > 192   SEGS PCT %  --   --  74   LYMPHO PCT %  --   --  8*   MONO PCT %  --   --  9   EOS PCT %  --   --  9*    < > = values in this interval not displayed.     Results from last 7 days   Lab Units 09/11/24  0537 09/10/24  1722   SODIUM mmol/L 140 133*   POTASSIUM mmol/L 3.7 3.8   CHLORIDE mmol/L 110* 102   CO2 mmol/L 25 26   BUN mg/dL 19 25    CREATININE mg/dL 1.20 1.43*   ANION GAP mmol/L 5 5   CALCIUM mg/dL 11.6* 13.4*   ALBUMIN g/dL  --  3.6   TOTAL BILIRUBIN mg/dL  --  0.73   ALK PHOS U/L  --  83   ALT U/L  --  41   AST U/L  --  51*   GLUCOSE RANDOM mg/dL 83 125             Results from last 7 days   Lab Units 09/10/24  1305   HEMOGLOBIN A1C % 5.2           Recent Cultures (last 7 days):         Imaging Review: No pertinent imaging studies reviewed.  Other Studies: No additional pertinent studies reviewed.    Last 24 Hours Medication List:     Current Facility-Administered Medications:     acetaminophen (TYLENOL) tablet 650 mg, Q6H PRN    amLODIPine (NORVASC) tablet 10 mg, Daily    cyanocobalamin (VITAMIN B-12) tablet 1,000 mcg, Daily    fenofibrate (TRICOR) tablet 145 mg, Daily    ferrous sulfate tablet 325 mg, Daily With Breakfast    heparin (porcine) subcutaneous injection 5,000 Units, Q8H STANLEY **AND** [COMPLETED] Platelet count, Once    ondansetron (ZOFRAN) injection 4 mg, Q6H PRN    oxybutynin (DITROPAN) tablet 5 mg, Q6H PRN    oxyCODONE (ROXICODONE) IR tablet 5 mg, Q4H PRN    potassium chloride (Klor-Con M20) CR tablet 40 mEq, BID    sodium chloride 0.9 % infusion, Continuous, Last Rate: 100 mL/hr (09/11/24 0829)    tamsulosin (FLOMAX) capsule 0.4 mg, Daily With Dinner    ursodiol (ACTIGALL) capsule 900 mg, Daily    Administrative Statements   Today, Patient Was Seen By: Roel Silva, MS4      **Please Note: This note may have been constructed using a voice recognition system.**

## 2024-09-11 NOTE — UTILIZATION REVIEW
NOTIFICATION OF INPATIENT ADMISSION   AUTHORIZATION REQUEST   SERVICING FACILITY:   [place] [address]  Tax ID: [id number]  NPI: [id number]   ATTENDING PROVIDER:  Attending Name and NPI#: Malcolm Copeland Md [[id number]]  Address: [address] [place] [address]  Phone: [phone]     ADMISSION INFORMATION:  Place of Service: Inpatient [facility]  Place of Service Code: 21  Inpatient Admission Date/Time: 9/10/24  6:41 PM  Discharge Date/Time: No discharge date for patient encounter.  Admitting Diagnosis Code/Description:  Hypercalcemia [E83.52]     UTILIZATION REVIEW CONTACT:  Octavia Tan Utilization   Network Utilization Review Department  Phone: [phone]  Fax: [phone]  Email: [email or web address]  Contact for approvals/pending authorizations, clinical reviews, and discharge.     PHYSICIAN ADVISORY SERVICES:  Medical Necessity Denial & Pcfi-po-Iumt Review  Phone: [phone]  Fax: [phone]  Email: [email or web address]     DISCHARGE SUPPORT TEAM:  For Patients Discharge Needs & Updates  Phone: [phone] opt. 2 Fax: [phone]  Email: [email or web address]

## 2024-09-11 NOTE — ASSESSMENT & PLAN NOTE
-  C/o intermittent genitalia pain and orange-red colored urine X 4-5 months. Ddx stone renal calculi vs UTI (low suspicion d/t neg UTI symptoms)  - 09/11 labs: WBC 1.99 from 3.4 (baseline ~ 4), Hb 9.1 from 11, plt 183 from 203 - could be dilutional from fluids      Plan:  - Repeat CBC in the am  - Check UA

## 2024-09-11 NOTE — UTILIZATION REVIEW
Initial Clinical Review    Admission: Date/Time/Statement:   Admission Orders (From admission, onward)       Ordered        09/10/24 1841  INPATIENT ADMISSION  Once                          Orders Placed This Encounter   Procedures    INPATIENT ADMISSION     Standing Status:   Standing     Number of Occurrences:   1     Order Specific Question:   Level of Care     Answer:   Med Surg [16]     Order Specific Question:   Estimated length of stay     Answer:   More than 2 Midnights     Order Specific Question:   Certification     Answer:   I certify that inpatient services are medically necessary for this patient for a duration of greater than two midnights. See H&P and MD Progress Notes for additional information about the patient's course of treatment.     ED Arrival Information       Expected   9/10/2024     Arrival   9/10/2024 16:27    Acuity   Urgent              Means of arrival   Walk-In    Escorted by   Self    Service   Hospitalist    Admission type   Emergency              Arrival complaint   Hypercalcemia             Chief Complaint   Patient presents with    Abnormal Lab     Pt states they had a blood test done today and it came back with hypercalcemia. Pt c/o dizziness, fatigue/weakness, and lack of appetite.        Initial Presentation: 58 y.o. female who presented self from home to [facility] ED. Admitted as Inpatient for evaluation and treatment of hypercalcemia. PMHx: cancer, HTN, CKD, COVID-19 (12/2022), Sarcoidosis, seizures, shingles. Presented w/ outpatient labs showing hypercalcemia (13.2 PTA). Endorses frequent urination and thirst,fatigue and loss of appetite. On exam, dry mucous membranes, colostomy, R port. Labs WBC 3.4. Crt 1.43. Calcium 13.4. Plan: IV NSS @ 100 mL/hr, continue amlodipine and ursodiol. Nephrology consulted.    Anticipated Length of Stay/Certification Statement: Patient will be admitted on an inpatient basis with an anticipated length of stay of greater than 2  midnights secondary to IV hydration.     Date: 09/11/24   Day 2: Reports pain around genitalia and orange-red colored urine. Exam: ill-appearing. Plan: IVF,  Check protein electrophoresis, Trend labs, replete electrolytes as needed. Check UA. Continue current meds. I&O.     Nephrology: Pt w/ hypercalcemia. Ionized calcium 1.67. check serum protein electrophoresis, PTH related peptide, vif D1, 25 dihydroxy level given hx of sarcoidosis. Check angiotensin-converting enzyme. Calcium 11.6 this morning.     ED Triage Vitals   Temperature Pulse Respirations Blood Pressure SpO2 Pain Score   09/10/24 1651 09/10/24 1651 09/10/24 1651 09/10/24 1651 09/10/24 1651 09/10/24 2100   98.4 °F (36.9 °C) 84 18 130/60 98 % 5     Weight (last 2 days)       Date/Time Weight    09/10/24 2045 54.9 (121)    09/10/24 1651 55.5 (122.36)            Vital Signs (last 3 days)       Date/Time Temp Pulse Resp BP MAP (mmHg) SpO2 O2 Device Patient Position - Orthostatic VS [place]ro Coma Scale Score Pain    09/11/24 0835 -- -- -- -- -- -- -- -- 15 5 09/11/24 07:19:17 98.1 °F (36.7 °C) 84 16 129/70 90 93 % None (Room air) Sitting -- --    09/11/24 0220 -- -- -- -- -- -- -- -- -- 6    09/10/24 22:43:42 98.1 °F (36.7 °C) 89 12 132/71 91 94 % -- -- -- --    09/10/24 2204 -- -- -- -- -- -- -- -- -- 5    09/10/24 2101 -- -- -- -- -- -- -- -- -- No Pain    09/10/24 2100 -- -- -- -- -- -- -- -- 15 5    09/10/24 20:42:06 98.2 °F (36.8 °C) 87 12 136/73 94 97 % -- -- -- --    09/10/24 20:41:50 98.2 °F (36.8 °C) 90 12 136/73 94 97 % -- -- -- --    09/10/24 1935 -- 96 18 187/81 117 98 % None (Room air) -- -- --    09/10/24 1720 -- -- -- -- -- -- -- -- 15 --    09/10/24 1651 98.4 °F (36.9 °C) 84 18 130/60 86 98 % None (Room air) Sitting -- --              Pertinent Labs/Diagnostic Test Results:  Results from last 7 days   Lab Units 09/11/24  0537 09/10/24  2225 09/10/24  1722   WBC Thousand/uL 1.99*  --  3.40*   HEMOGLOBIN g/dL 9.1*  --  9.3*   HEMATOCRIT %  28.2*  --  28.5*   PLATELETS Thousands/uL 183 203 192   TOTAL NEUT ABS Thousands/µL  --   --  2.50         Results from last 7 days   Lab Units 09/11/24  0537 09/10/24  1722 09/10/24  1305   SODIUM mmol/L 140 133* 135   POTASSIUM mmol/L 3.7 3.8 4.2   CHLORIDE mmol/L 110* 102 102   CO2 mmol/L 25 26 26   ANION GAP mmol/L 5 5 7   BUN mg/dL 19 25 24   CREATININE mg/dL 1.20 1.43* 1.34*   EGFR ml/min/1.73sq m 50 40 44   CALCIUM mg/dL 11.6* 13.4* 13.2*   CALCIUM, IONIZED mmol/L  --  1.67*  --    MAGNESIUM mg/dL  --  1.7*  --    PHOSPHORUS mg/dL  --  3.9  --      Results from last 7 days   Lab Units 09/10/24  1722   AST U/L 51*   ALT U/L 41   ALK PHOS U/L 83   TOTAL PROTEIN g/dL 6.3*   ALBUMIN g/dL 3.6   TOTAL BILIRUBIN mg/dL 0.73   BILIRUBIN DIRECT mg/dL 0.30*      Results from last 7 days   Lab Units 09/11/24  0537 09/10/24  1722 09/10/24  1305   GLUCOSE RANDOM mg/dL 83 125 101      Results from last 7 days   Lab Units 09/10/24  1305   HEMOGLOBIN A1C % 5.2   EAG mg/dl 103      Results from last 7 days   Lab Units 09/10/24  2225   CLARITY UA  Clear   COLOR UA  Colorless   SPEC GRAV UA  1.006   PH UA  6.5   GLUCOSE UA mg/dl Negative   KETONES UA mg/dl Negative   BLOOD UA  Moderate*   PROTEIN UA mg/dl Negative   NITRITE UA  Negative   BILIRUBIN UA  Negative   UROBILINOGEN UA (BE) mg/dl <2.0   LEUKOCYTES UA  Large*   WBC UA /hpf 20-30*   RBC UA /hpf 10-20*   BACTERIA UA /hpf Occasional   EPITHELIAL CELLS WET PREP /hpf Occasional       ED Treatment-Medication Administration from 09/10/2024 1552 to 09/10/2024 2031         Date/Time Order Dose Route Action     09/10/2024 1756 sodium chloride 0.9 % bolus 1,000 mL 1,000 mL Intravenous New Bag            Past Medical History:   Diagnosis Date    Bleeding from colostomy stoma (HCC)      states ileostomy , not colostomy    Cancer (HCC)     Colon cancer (HCC)     Fall     Headache     Hemochromatosis, unspecified     History of transfusion     2022 - no adverse reaction     Hypertension     Kidney calculi     Kidney stone     Liver disease     hemangioma    Muscle weakness     Personal history of COVID-19 12/2022    Sarcoidosis     Seizures (HCC)     2022    Shingles      Present on Admission:   Hypertension   Rectal cancer (HCC)   Sarcoidosis      Admitting Diagnosis: Hypercalcemia [E83.52]  Age/Sex: 58 y.o. female  Admission Orders:  Scheduled Medications:  amLODIPine, 10 mg, Oral, Daily  cyanocobalamin, 1,000 mcg, Oral, Daily  fenofibrate, 145 mg, Oral, Daily  ferrous sulfate, 325 mg, Oral, Daily With Breakfast  heparin (porcine), 5,000 Units, Subcutaneous, Q8H STANLEY  potassium chloride, 40 mEq, Oral, BID  tamsulosin, 0.4 mg, Oral, Daily With Dinner  ursodiol, 900 mg, Oral, Daily    Continuous IV Infusions:  sodium chloride, 100 mL/hr, Intravenous, Continuous    PRN Meds:  acetaminophen, 650 mg, Oral, Q6H PRN  ondansetron, 4 mg, Intravenous, Q6H PRN  oxybutynin, 5 mg, Oral, Q6H PRN; 8/10 x1, 8/11 x1  oxyCODONE, 5 mg, Oral, Q4H PRN; 8/10 x1, 8/11 x2    sodium chloride 0.9 % bolus 500 mL  Dose: 500 mL  Freq: Once Route: IV  Last Dose: 500 mL (09/10/24 2104)  Start: 09/10/24 1930 End: 09/10/24 2204    IP CONSULT TO NEPHROLOGY    Network Utilization Review Department  ATTENTION: Please call with any questions or concerns to [phone] and carefully listen to the prompts so that you are directed to the right person. All voicemails are confidential.   For Discharge needs, contact Care Management DC Support Team at [phone] opt. 2  Send all requests for admission clinical reviews, approved or denied determinations and any other requests to dedicated fax number below belonging to the campus where the patient is receiving treatment. List of dedicated fax numbers for the Facilities:  FACILITY NAME UR FAX NUMBER   ADMISSION DENIALS (Administrative/Medical Necessity) [phone]   DISCHARGE SUPPORT TEAM (NETWORK) [phone]   PARENT CHILD HEALTH (Maternity/NICU/Pediatrics) [phone]    [facility] [phone]   [facility] [phone]   [facility] [phone]   [facility] [phone]   [facility] [phone]   [facility] [phone]   [facility] [phone]   [facility] [phone]   [facility] [phone]   [facility] [phone]   [facility] [phone]   [facility] [phone]

## 2024-09-11 NOTE — ASSESSMENT & PLAN NOTE
-- Follows with colorectal surgery and oncology  -- Status post robotic low anterior resection low pelvic anastomosis colorectal anastomosis stapled left-sided loop colostomy reversal with a diverting loop ileostomy on August 17, 2023 negative margins.

## 2024-09-11 NOTE — ASSESSMENT & PLAN NOTE
-Patient noted to have elevated calcium up to 13.2 on outpatient labs.  -Sent to [facility] for management of hypercalcemia.  -Endorses frequent urination and thirst.  Endorses fatigue and loss of appetite.  Denies abdominal pain, headaches, bone pain.  She does have history of kidney stones as well.  -Patient with history of biopsy-proven sarcoidosis which was managed with prednisone.  -Ddx: Hypercalcemia likely in the setting of sarcoidosis flareup.    Plan:  -IV sodium chloride at 100 cc/h.  -Follow-up BMP in a.m  -Nephrology consulted.  -Consider bisphosphonate therapy for management of hypercalcemia to prevent use of steroids so patient is able to undergo surgery on 9/26 since she has had multiple delays in the past.

## 2024-09-11 NOTE — PLAN OF CARE
Problem: PAIN - ADULT  Goal: Verbalizes/displays adequate comfort level or baseline comfort level  Description: Interventions:  - Encourage patient to monitor pain and request assistance  - Assess pain using appropriate pain scale  - Administer analgesics based on type and severity of pain and evaluate response  - Implement non-pharmacological measures as appropriate and evaluate response  - Consider cultural and social influences on pain and pain management  - Notify physician/advanced practitioner if interventions unsuccessful or patient reports new pain  Outcome: Progressing     Problem: INFECTION - ADULT  Goal: Absence or prevention of progression during hospitalization  Description: INTERVENTIONS:  - Assess and monitor for signs and symptoms of infection  - Monitor lab/diagnostic results  - Monitor all insertion sites, i.e. indwelling lines, tubes, and drains  - Monitor endotracheal if appropriate and nasal secretions for changes in amount and color  - [place] appropriate cooling/warming therapies per order  - Administer medications as ordered  - Instruct and encourage patient and family to use good hand hygiene technique  - Identify and instruct in appropriate isolation precautions for identified infection/condition  Outcome: Progressing  Goal: Absence of fever/infection during neutropenic period  Description: INTERVENTIONS:  - Monitor WBC    Outcome: Progressing     Problem: SAFETY ADULT  Goal: Patient will remain free of falls  Description: INTERVENTIONS:  - Educate patient/family on patient safety including physical limitations  - Instruct patient to call for assistance with activity   - Consult OT/PT to assist with strengthening/mobility   - Keep Call bell within reach  - Keep bed low and locked with side rails adjusted as appropriate  - Keep care items and personal belongings within reach  - Initiate and maintain comfort rounds  - Make Fall Risk Sign visible to staff  - Offer Toileting every 2 Hours,  in advance of need  - Initiate/Maintain alarm  - Obtain necessary fall risk management equipment:   - Apply yellow socks and bracelet for high fall risk patients  - Consider moving patient to room near nurses station  Outcome: Progressing  Goal: Maintain or return to baseline ADL function  Description: INTERVENTIONS:  -  Assess patient's ability to carry out ADLs; assess patient's baseline for ADL function and identify physical deficits which impact ability to perform ADLs (bathing, care of mouth/teeth, toileting, grooming, dressing, etc.)  - Assess/evaluate cause of self-care deficits   - Assess range of motion  - Assess patient's mobility; develop plan if impaired  - Assess patient's need for assistive devices and provide as appropriate  - Encourage maximum independence but intervene and supervise when necessary  - Involve family in performance of ADLs  - Assess for home care needs following discharge   - Consider OT consult to assist with ADL evaluation and planning for discharge  - Provide patient education as appropriate  Outcome: Progressing  Goal: Maintains/Returns to pre admission functional level  Description: INTERVENTIONS:  - Perform AM-PAC 6 Click Basic Mobility/ Daily Activity assessment daily.  - Set and communicate daily mobility goal to care team and patient/family/caregiver.   - Collaborate with rehabilitation services on mobility goals if consulted  - Perform Range of Motion  times a day.  - Reposition patient every  hours.  - Dangle patient  times a day  - Stand patient  times a day  - Ambulate patient  times a day  - Out of bed to chair  times a day   - Out of bed for meals  times a day  - Out of bed for toileting  - Record patient progress and toleration of activity level   Outcome: Progressing     Problem: DISCHARGE PLANNING  Goal: Discharge to home or other facility with appropriate resources  Description: INTERVENTIONS:  - Identify barriers to discharge w/patient and caregiver  - Arrange for  needed discharge resources and transportation as appropriate  - Identify discharge learning needs (meds, wound care, etc.)  - Arrange for interpretive services to assist at discharge as needed  - Refer to Case Management Department for coordinating discharge planning if the patient needs post-hospital services based on physician/advanced practitioner order or complex needs related to functional status, cognitive ability, or social support system  Outcome: Progressing     Problem: Knowledge Deficit  Goal: Patient/family/caregiver demonstrates understanding of disease process, treatment plan, medications, and discharge instructions  Description: Complete learning assessment and assess knowledge base.  Interventions:  - Provide teaching at level of understanding  - Provide teaching via preferred learning methods  Outcome: Progressing

## 2024-09-11 NOTE — ASSESSMENT & PLAN NOTE
-Elevated calcium to 13.2 on outpatient labs.Sent to [facility] for management of hypercalcemia.  -Endorses frequent urination and thirst.  Endorses fatigue and loss of appetite. Hx of non-obstructing renal calculi.  -Patient with history of biopsy-proven sarcoidosis which was managed with prednisone.  -Ddx: Hypercalcemia likely in the setting of sarcoidosis flareup.  - Labs 9/11: Na 140 K 3.7 Ca 11.6 Cr Mg 1.7 PTH 4.7 WBC 1.99 from 3.4, Hb 9.1 from 11.1 Plt 183                      LFT: T bill .73, Direct apple 0.3 AST 83 ALT 41 Total protein 6.3, Alb 3.6    Plan:  -IV sodium chloride at 100 cc/h.  -Follow-up BMP and ionized calcium  - Check protein electrophoresis   - Nephrology consulted, appreciate recs

## 2024-09-11 NOTE — ASSESSMENT & PLAN NOTE
-- Liver biopsy proven nonnecrotizing granulomas consistent with hepatic involvement of her sarcoidosis.  -- Currently off prednisone  -- Prednisone in the past, along with UDCA but has had recrudescence in ALP and ACE levels have been rising  -- Currently on fenofibrate  -- Being managed by GI as an outpatient and pulmonary

## 2024-09-11 NOTE — PLAN OF CARE
Problem: PAIN - ADULT  Goal: Verbalizes/displays adequate comfort level or baseline comfort level  Description: Interventions:  - Encourage patient to monitor pain and request assistance  - Assess pain using appropriate pain scale  - Administer analgesics based on type and severity of pain and evaluate response  - Implement non-pharmacological measures as appropriate and evaluate response  - Consider cultural and social influences on pain and pain management  - Notify physician/advanced practitioner if interventions unsuccessful or patient reports new pain  Outcome: Progressing     Problem: INFECTION - ADULT  Goal: Absence or prevention of progression during hospitalization  Description: INTERVENTIONS:  - Assess and monitor for signs and symptoms of infection  - Monitor lab/diagnostic results  - Monitor all insertion sites, i.e. indwelling lines, tubes, and drains  - Monitor endotracheal if appropriate and nasal secretions for changes in amount and color  - [place] appropriate cooling/warming therapies per order  - Administer medications as ordered  - Instruct and encourage patient and family to use good hand hygiene technique  - Identify and instruct in appropriate isolation precautions for identified infection/condition  Outcome: Progressing  Goal: Absence of fever/infection during neutropenic period  Description: INTERVENTIONS:  - Monitor WBC    Outcome: Progressing     Problem: SAFETY ADULT  Goal: Patient will remain free of falls  Description: INTERVENTIONS:  - Educate patient/family on patient safety including physical limitations  - Instruct patient to call for assistance with activity   - Consult OT/PT to assist with strengthening/mobility   - Keep Call bell within reach  - Keep bed low and locked with side rails adjusted as appropriate  - Keep care items and personal belongings within reach  - Initiate and maintain comfort rounds  - Make Fall Risk Sign visible to staff  - Offer Toileting every  Hours,  in advance of need  - Initiate/Maintain alarm  - Obtain necessary fall risk management equipment:   - Apply yellow socks and bracelet for high fall risk patients  - Consider moving patient to room near nurses station  Outcome: Progressing  Goal: Maintain or return to baseline ADL function  Description: INTERVENTIONS:  -  Assess patient's ability to carry out ADLs; assess patient's baseline for ADL function and identify physical deficits which impact ability to perform ADLs (bathing, care of mouth/teeth, toileting, grooming, dressing, etc.)  - Assess/evaluate cause of self-care deficits   - Assess range of motion  - Assess patient's mobility; develop plan if impaired  - Assess patient's need for assistive devices and provide as appropriate  - Encourage maximum independence but intervene and supervise when necessary  - Involve family in performance of ADLs  - Assess for home care needs following discharge   - Consider OT consult to assist with ADL evaluation and planning for discharge  - Provide patient education as appropriate  Outcome: Progressing  Goal: Maintains/Returns to pre admission functional level  Description: INTERVENTIONS:  - Perform AM-PAC 6 Click Basic Mobility/ Daily Activity assessment daily.  - Set and communicate daily mobility goal to care team and patient/family/caregiver.   - Collaborate with rehabilitation services on mobility goals if consulted  - Perform Range of Motion  times a day.  - Reposition patient every  hours.  - Dangle patient  times a day  - Stand patient  times a day  - Ambulate patient  times a day  - Out of bed to chair  times a day   - Out of bed for meals  times a day  - Out of bed for toileting  - Record patient progress and toleration of activity level   Outcome: Progressing     Problem: DISCHARGE PLANNING  Goal: Discharge to home or other facility with appropriate resources  Description: INTERVENTIONS:  - Identify barriers to discharge w/patient and caregiver  - Arrange for  needed discharge resources and transportation as appropriate  - Identify discharge learning needs (meds, wound care, etc.)  - Arrange for interpretive services to assist at discharge as needed  - Refer to Case Management Department for coordinating discharge planning if the patient needs post-hospital services based on physician/advanced practitioner order or complex needs related to functional status, cognitive ability, or social support system  Outcome: Progressing     Problem: Knowledge Deficit  Goal: Patient/family/caregiver demonstrates understanding of disease process, treatment plan, medications, and discharge instructions  Description: Complete learning assessment and assess knowledge base.  Interventions:  - Provide teaching at level of understanding  - Provide teaching via preferred learning methods  Outcome: Progressing

## 2024-09-11 NOTE — ASSESSMENT & PLAN NOTE
Lab Results   Component Value Date    EGFR 50 09/11/2024    EGFR 40 09/10/2024    EGFR 44 09/10/2024    CREATININE 1.20 09/11/2024    CREATININE 1.43 (H) 09/10/2024    CREATININE 1.34 (H) 09/10/2024

## 2024-09-11 NOTE — ASSESSMENT & PLAN NOTE
-- Creatinine peaked at 1.43 mg/dL, likely in setting of hypercalcemia causing renal vasoconstriction  -- Renal function improved creatinine back to baseline at 1.2 mg/dL

## 2024-09-12 PROBLEM — E87.6 HYPOKALEMIA: Status: ACTIVE | Noted: 2024-09-12

## 2024-09-12 LAB
25(OH)D3 SERPL-MCNC: 29 NG/ML (ref 30–100)
ANION GAP SERPL CALCULATED.3IONS-SCNC: 4 MMOL/L (ref 4–13)
ATRIAL RATE: 83 BPM
BACTERIA UR CULT: NORMAL
BASOPHILS # BLD AUTO: 0.01 THOUSANDS/ÂΜL (ref 0–0.1)
BASOPHILS NFR BLD AUTO: 1 % (ref 0–1)
BUN SERPL-MCNC: 15 MG/DL (ref 5–25)
CA-I BLD-SCNC: 1.46 MMOL/L (ref 1.12–1.32)
CALCIUM SERPL-MCNC: 10.9 MG/DL (ref 8.4–10.2)
CHLORIDE SERPL-SCNC: 110 MMOL/L (ref 96–108)
CO2 SERPL-SCNC: 26 MMOL/L (ref 21–32)
CREAT SERPL-MCNC: 1.14 MG/DL (ref 0.6–1.3)
EOSINOPHIL # BLD AUTO: 0.17 THOUSAND/ÂΜL (ref 0–0.61)
EOSINOPHIL NFR BLD AUTO: 10 % (ref 0–6)
ERYTHROCYTE [DISTWIDTH] IN BLOOD BY AUTOMATED COUNT: 13 % (ref 11.6–15.1)
GFR SERPL CREATININE-BSD FRML MDRD: 53 ML/MIN/1.73SQ M
GLUCOSE SERPL-MCNC: 82 MG/DL (ref 65–140)
HCT VFR BLD AUTO: 28.4 % (ref 34.8–46.1)
HGB BLD-MCNC: 9.3 G/DL (ref 11.5–15.4)
IMM GRANULOCYTES # BLD AUTO: 0 THOUSAND/UL (ref 0–0.2)
IMM GRANULOCYTES NFR BLD AUTO: 0 % (ref 0–2)
LYMPHOCYTES # BLD AUTO: 0.17 THOUSANDS/ÂΜL (ref 0.6–4.47)
LYMPHOCYTES NFR BLD AUTO: 10 % (ref 14–44)
MCH RBC QN AUTO: 28.6 PG (ref 26.8–34.3)
MCHC RBC AUTO-ENTMCNC: 32.7 G/DL (ref 31.4–37.4)
MCV RBC AUTO: 87 FL (ref 82–98)
MONOCYTES # BLD AUTO: 0.2 THOUSAND/ÂΜL (ref 0.17–1.22)
MONOCYTES NFR BLD AUTO: 12 % (ref 4–12)
NEUTROPHILS # BLD AUTO: 1.11 THOUSANDS/ÂΜL (ref 1.85–7.62)
NEUTS SEG NFR BLD AUTO: 67 % (ref 43–75)
NRBC BLD AUTO-RTO: 0 /100 WBCS
P AXIS: 66 DEGREES
PLATELET # BLD AUTO: 148 THOUSANDS/UL (ref 149–390)
PMV BLD AUTO: 9.5 FL (ref 8.9–12.7)
POTASSIUM SERPL-SCNC: 3.4 MMOL/L (ref 3.5–5.3)
PR INTERVAL: 184 MS
QRS AXIS: 18 DEGREES
QRSD INTERVAL: 80 MS
QT INTERVAL: 332 MS
QTC INTERVAL: 390 MS
RBC # BLD AUTO: 3.25 MILLION/UL (ref 3.81–5.12)
SODIUM SERPL-SCNC: 140 MMOL/L (ref 135–147)
T WAVE AXIS: 46 DEGREES
VENTRICULAR RATE: 83 BPM
WBC # BLD AUTO: 1.66 THOUSAND/UL (ref 4.31–10.16)

## 2024-09-12 PROCEDURE — 82306 VITAMIN D 25 HYDROXY: CPT | Performed by: INTERNAL MEDICINE

## 2024-09-12 PROCEDURE — 82397 CHEMILUMINESCENT ASSAY: CPT | Performed by: INTERNAL MEDICINE

## 2024-09-12 PROCEDURE — 85025 COMPLETE CBC W/AUTO DIFF WBC: CPT

## 2024-09-12 PROCEDURE — 86334 IMMUNOFIX E-PHORESIS SERUM: CPT | Performed by: INTERNAL MEDICINE

## 2024-09-12 PROCEDURE — 82330 ASSAY OF CALCIUM: CPT | Performed by: INTERNAL MEDICINE

## 2024-09-12 PROCEDURE — 84165 PROTEIN E-PHORESIS SERUM: CPT | Performed by: INTERNAL MEDICINE

## 2024-09-12 PROCEDURE — 82652 VIT D 1 25-DIHYDROXY: CPT | Performed by: INTERNAL MEDICINE

## 2024-09-12 PROCEDURE — 99232 SBSQ HOSP IP/OBS MODERATE 35: CPT | Performed by: INTERNAL MEDICINE

## 2024-09-12 PROCEDURE — NC001 PR NO CHARGE: Performed by: INTERNAL MEDICINE

## 2024-09-12 PROCEDURE — 93010 ELECTROCARDIOGRAM REPORT: CPT | Performed by: INTERNAL MEDICINE

## 2024-09-12 PROCEDURE — 82164 ANGIOTENSIN I ENZYME TEST: CPT | Performed by: INTERNAL MEDICINE

## 2024-09-12 PROCEDURE — 99254 IP/OBS CNSLTJ NEW/EST MOD 60: CPT | Performed by: INTERNAL MEDICINE

## 2024-09-12 PROCEDURE — 80048 BASIC METABOLIC PNL TOTAL CA: CPT

## 2024-09-12 RX ADMIN — OXYCODONE HYDROCHLORIDE 5 MG: 5 TABLET ORAL at 09:11

## 2024-09-12 RX ADMIN — POTASSIUM CHLORIDE 40 MEQ: 1500 TABLET, EXTENDED RELEASE ORAL at 16:53

## 2024-09-12 RX ADMIN — AMLODIPINE BESYLATE 10 MG: 10 TABLET ORAL at 08:45

## 2024-09-12 RX ADMIN — OXYCODONE HYDROCHLORIDE 5 MG: 5 TABLET ORAL at 00:49

## 2024-09-12 RX ADMIN — OXYBUTYNIN CHLORIDE 5 MG: 5 TABLET ORAL at 11:07

## 2024-09-12 RX ADMIN — TAMSULOSIN HYDROCHLORIDE 0.4 MG: 0.4 CAPSULE ORAL at 16:53

## 2024-09-12 RX ADMIN — OXYCODONE HYDROCHLORIDE 5 MG: 5 TABLET ORAL at 05:07

## 2024-09-12 RX ADMIN — CYANOCOBALAMIN TAB 500 MCG 1000 MCG: 500 TAB at 08:45

## 2024-09-12 RX ADMIN — POTASSIUM CHLORIDE 40 MEQ: 1500 TABLET, EXTENDED RELEASE ORAL at 08:45

## 2024-09-12 RX ADMIN — SODIUM CHLORIDE 100 ML/HR: 0.9 INJECTION, SOLUTION INTRAVENOUS at 05:34

## 2024-09-12 RX ADMIN — FERROUS SULFATE TAB 325 MG (65 MG ELEMENTAL FE) 325 MG: 325 (65 FE) TAB at 08:46

## 2024-09-12 RX ADMIN — OXYCODONE HYDROCHLORIDE 5 MG: 5 TABLET ORAL at 14:30

## 2024-09-12 RX ADMIN — FENOFIBRATE 145 MG: 145 TABLET ORAL at 08:45

## 2024-09-12 RX ADMIN — HEPARIN SODIUM 5000 UNITS: 5000 INJECTION INTRAVENOUS; SUBCUTANEOUS at 05:07

## 2024-09-12 RX ADMIN — HEPARIN SODIUM 5000 UNITS: 5000 INJECTION INTRAVENOUS; SUBCUTANEOUS at 14:12

## 2024-09-12 RX ADMIN — OXYBUTYNIN CHLORIDE 5 MG: 5 TABLET ORAL at 05:07

## 2024-09-12 RX ADMIN — HEPARIN SODIUM 5000 UNITS: 5000 INJECTION INTRAVENOUS; SUBCUTANEOUS at 22:10

## 2024-09-12 RX ADMIN — URSODIOL 900 MG: 300 CAPSULE ORAL at 09:11

## 2024-09-12 RX ADMIN — OXYCODONE HYDROCHLORIDE 5 MG: 5 TABLET ORAL at 19:35

## 2024-09-12 RX ADMIN — OXYBUTYNIN CHLORIDE 5 MG: 5 TABLET ORAL at 17:00

## 2024-09-12 NOTE — PROGRESS NOTES
Progress Note - Nephrology   Name: Danielle Haque 58 y.o. female I MRN: [id number]  Unit/Bed#: W -01 I Date of Admission: 9/10/2024   Date of Service: 9/12/2024 I Hospital Day: 2     Assessment & Plan  Hypercalcemia  -- Recurrent issue  -- Present on admission, calcium was 13.2 peaking at 13.4  -- Ionized calcium level high at 1.67 on admission, ionized calcium level has improved to 1.46  -- PTH is suppressed, less likely a parathyroid issue  -- Follow-up serum protein electrophoresis, check PTH related peptide, vitamin D 1, 25 dihydroxy level, and angiotensin-converting enzyme given her history of sarcoidosis  -- Angiotensin-converting enzyme from last month was elevated at 117 back in August  -- Had been on steroids in the past currently off steroids.  Steroids on hold for future surgery  --Total serum calcium level improved to 10.9  -- Trend daily ionized calcium levels  -- Continue volume resuscitation with normal saline    Hypertension  -- Blood pressure currently stable and controlled  --Continue amlodipine 10 mg daily  -- Currently on normal saline and euvolemic  --Continue current management  Rectal cancer (HCC)  -- Follows with colorectal surgery and oncology  -- Status post robotic low anterior resection low pelvic anastomosis colorectal anastomosis stapled left-sided loop colostomy reversal with a diverting loop ileostomy on August 17, 2023 negative margins.    Sarcoidosis  -- Liver biopsy proven nonnecrotizing granulomas consistent with hepatic involvement of her sarcoidosis.  -- Currently off prednisone  -- Prednisone in the past, along with UDCA but has had recrudescence in ALP and ACE levels have been rising  -- Currently on fenofibrate  -- Being managed by GI as an outpatient and pulmonary    CKD stage G3a/A2, GFR 45-59 and albumin creatinine ratio  mg/g ([facility])  Lab Results   Component Value Date    EGFR 53 09/12/2024    EGFR 50 09/11/2024    EGFR 40 09/10/2024    CREATININE 1.14  09/12/2024    CREATININE 1.20 09/11/2024    CREATININE 1.43 (H) 09/10/2024   -- Outpatient nephrologist Dr. Reyes  -- Baseline creatinine low 1's  -- History of bilateral hydronephrosis.  -- CT scan showed improved right-sided hydronephrosis.  Double-J right ureteral stent.  Persistent mild left-sided hydroureteronephrosis with a double-J ureteral stent  --Renal function remained stable and at baseline  Elevated serum creatinine (Resolved: 9/11/2024)  -- Creatinine peaked at 1.43 mg/dL, likely in setting of hypercalcemia causing renal vasoconstriction  -- Renal function improved creatinine back to baseline at 1.2 mg/dL    Hypokalemia  -- Potassium 3.4, likely secondary to iatrogenic normal saline administration  -- Currently on potassium chloride 40 mEq twice daily continue this for now    I have reviewed the nephrology recommendations including assessment and plan, with patient and her , and we are in agreement with renal plan including the information outlined above. Nephrology service will follow.    History of Present Illness   Brief History of Admission - 58-year-old female with a history of chronic kidney disease stage IIIA, hypertension, hepatic sarcoidosis, adrenal nodule, colon cancer status post diverting ileostomy who presented as a direct admission for hypercalcemia.     No chest pain no shortness of breath feels well.    I spoke to her  over the telephone    Objective      Temp:  [97.6 °F (36.4 °C)-98.5 °F (36.9 °C)] 98.5 °F (36.9 °C)  HR:  [73-79] 73  Resp:  [17] 17  BP: (114-130)/(50-66) 123/50  O2 Device: None (Room air)         Vitals:    09/10/24 1651 09/10/24 2045   Weight: 55.5 kg (122 lb 5.7 oz) 54.9 kg (121 lb)     I/O last 24 hours:  In: 670 [P.O.:660; I.V.:10]  Out: 800 [Urine:800]  Lines/Drains/Airways       Active Status       Name Placement date Placement time Site Days    Port A Cath 03/21/23 Right Internal jugular 03/21/23  1022  Internal jugular  541    Ileostomy Loop RLQ  08/17/23  1140  RLQ  392    Ureteral Internal Stent Left ureter 6 Fr. 07/21/23  0946  Left ureter  419    Ureteral Internal Stent Right ureter 7 Fr. 07/21/23  1004  Right ureter  419    Ureteral Internal Stent Right ureter 7 Fr. 10/11/23  1505  Right ureter  336    Ureteral Internal Stent Left ureter 6 Fr. 05/10/24  1236  Left ureter  124    Ureteral Internal Stent Right ureter 6 Fr. 05/10/24  1241  Right ureter  124                  Physical Exam  Vitals and nursing note reviewed.   Constitutional:       General: She is not in acute distress.     Appearance: She is well-developed.   HENT:      Head: Normocephalic and atraumatic.   Eyes:      General: No scleral icterus.     Conjunctiva/sclera: Conjunctivae normal.      Pupils: Pupils are equal, round, and reactive to light.   Cardiovascular:      Rate and Rhythm: Normal rate and regular rhythm.      Heart sounds: S1 normal and S2 normal. No murmur heard.     No friction rub. No gallop.   Pulmonary:      Effort: Pulmonary effort is normal. No respiratory distress.      Breath sounds: Normal breath sounds. No wheezing or rales.   Abdominal:      General: Bowel sounds are normal.      Palpations: Abdomen is soft.      Tenderness: There is no abdominal tenderness. There is no rebound.   Musculoskeletal:         General: Normal range of motion.      Cervical back: Normal range of motion and neck supple.   Skin:     Findings: No rash.   Neurological:      Mental Status: She is alert and oriented to person, place, and time.   Psychiatric:         Behavior: Behavior normal.        Medications:    Current Facility-Administered Medications:     acetaminophen (TYLENOL) tablet 650 mg, 650 mg, Oral, Q6H PRN, Lilliam Teixeira MD    amLODIPine (NORVASC) tablet 10 mg, 10 mg, Oral, Daily, Lilliam Teixeira MD, 10 mg at 09/12/24 0845    cyanocobalamin (VITAMIN B-12) tablet 1,000 mcg, 1,000 mcg, Oral, Daily, Lilliam Teixeira MD, 1,000 mcg at 09/12/24 0845    fenofibrate (TRICOR) tablet 145 mg,  145 mg, Oral, Daily, Lilliam Teixeira MD, 145 mg at 09/12/24 0845    ferrous sulfate tablet 325 mg, 325 mg, Oral, Daily With Breakfast, Lilliam Teixeira MD, 325 mg at 09/12/24 0846    heparin (porcine) subcutaneous injection 5,000 Units, 5,000 Units, Subcutaneous, Q8H STANLEY, 5,000 Units at 09/12/24 0507 **AND** [COMPLETED] Platelet count, , , Once, Lilliam Teixeira MD    ondansetron (ZOFRAN) injection 4 mg, 4 mg, Intravenous, Q6H PRN, Lilliam Teixeira MD    oxybutynin (DITROPAN) tablet 5 mg, 5 mg, Oral, Q6H PRN, Lilliam Teixeira MD, 5 mg at 09/12/24 1107    oxyCODONE (ROXICODONE) IR tablet 5 mg, 5 mg, Oral, Q4H PRN, Lilliam Teixeira MD, 5 mg at 09/12/24 0911    potassium chloride (Klor-Con M20) CR tablet 40 mEq, 40 mEq, Oral, BID, Lilliam Teixeira MD, 40 mEq at 09/12/24 0845    sodium chloride 0.9 % infusion, 100 mL/hr, Intravenous, Continuous, Genaro Cooper MD, Last Rate: 100 mL/hr at 09/12/24 0534, 100 mL/hr at 09/12/24 0534    tamsulosin (FLOMAX) capsule 0.4 mg, 0.4 mg, Oral, Daily With Dinner, iLlliam Teixeira MD, 0.4 mg at 09/11/24 1541    ursodiol (ACTIGALL) capsule 900 mg, 900 mg, Oral, Daily, Lilliam Teixeira MD, 900 mg at 09/12/24 0911      Lab Results: I have reviewed the following results: CBC/BMP:   .     09/12/24  0526 09/12/24  0716   WBC 1.66*  --    HGB 9.3*  --    HCT 28.4*  --    *  --    SODIUM  --  140   K  --  3.4*   CL  --  110*   CO2  --  26   BUN  --  15   CREATININE  --  1.14   GLUC  --  82   CAIONIZED 1.46*  --       Results from last 7 days   Lab Units 09/12/24  0716 09/12/24  0526 09/11/24  0537 09/10/24  2225 09/10/24  1722 09/10/24  1305   WBC Thousand/uL  --  1.66* 1.99*  --  3.40*  --    HEMOGLOBIN g/dL  --  9.3* 9.1*  --  9.3*  --    HEMATOCRIT %  --  28.4* 28.2*  --  28.5*  --    PLATELETS Thousands/uL  --  148* 183 203 192  --    POTASSIUM mmol/L 3.4*  --  3.7  --  3.8 4.2   CHLORIDE mmol/L 110*  --  110*  --  102 102   CO2 mmol/L 26  --  25  --  26 26   BUN mg/dL 15  --  19  --  25 24   CREATININE mg/dL  "1.14  --  1.20  --  1.43* 1.34*   CALCIUM mg/dL 10.9*  --  11.6*  --  13.4* 13.2*   MAGNESIUM mg/dL  --   --   --   --  1.7*  --    PHOSPHORUS mg/dL  --   --   --   --  3.9  --    ALBUMIN g/dL  --   --   --   --  3.6  --        Administrative Statements   I have spent a total time of 25 minutes in caring for this patient on the day of the visit/encounter including Diagnostic results, Prognosis, Risks and benefits of tx options, Instructions for management, Patient and family education, Importance of tx compliance, Risk factor reductions, Impressions, Counseling / Coordination of care, Documenting in the medical record, Reviewing / ordering tests, medicine, procedures  , and Obtaining or reviewing history  .  Portions of the record may have been created with voice recognition software. Occasional wrong word or \"sound a like\" substitutions may have occurred due to the inherent limitations of voice recognition software. Read the chart carefully and recognize, using context, where substitutions have occurred.If you have any questions, please contact the dictating provider.  "

## 2024-09-12 NOTE — PROGRESS NOTES
Progress Note - Hospitalist   Name: Danielle Haque 58 y.o. female I MRN: [id number]  Unit/Bed#: W -01 I Date of Admission: 9/10/2024   Date of Service: 9/12/2024 I Hospital Day: 2    Assessment & Plan  Hypercalcemia  -Elevated calcium to 13.2 on outpatient labs.Sent to [facility] for management of hypercalcemia.  -Endorses frequent urination and thirst.  Endorses fatigue and loss of appetite. Hx of non-obstructing renal calculi.  -Patient with history of biopsy-proven sarcoidosis which was managed with prednisone.  -Ddx: Hypercalcemia likely in the setting of sarcoidosis flareup.  - 9/11: Na 140 K 3.7 Ca 11.6 Cr Mg 1.7 PTH 4.7 WBC 1.99 from 3.4, Hb 9.1 from 11.1 Plt 183,  LFT: T bill .73, Direct apple 0.3     AST 83 ALT 41 Total protein 6.3, Alb 3.6  - 9/12:  WBC 1.66 Hb 9.3 Plt 183 Na 140 K 3.4 Ca 10.9 Cr 1.4 BUN 15    Plan:  -IV sodium chloride at 100 cc/h.  - Follow-up BMP, ionized calcium, 25 OH and 1, 25 OH Vit D levels, ACE  - Check protein electrophoresis  - Nephrology consulted, appreciate recs    Rectal cancer (HCC)  Patient has history of rectal cancer status post diverting loop ileostomy on 8/17/2023 currently in remission.  Complicated by ureteral strictures.  Patient was tentatively due for ileostomy reversal on 9/26/2024.  Prior to that she will have flexible sigmoidoscopy to check anastomosis.  Hypertension  -Continue amlodipine 10 mg.  Sarcoidosis  -History of biopsy proven sarcoidosis in the lung and liver.    -Follows with Dr. Machuca (GI) in the outpatient setting.    -Currently on ursodiol 900 mg daily.  -Was on chronic prednisone which was tapered 2 weeks ago for her to undergo ileostomy reversal   surgery.     Plan:  -Continue ursodiol 90 mg daily.  - Rheumatology consulted, appreciate recs.  Pancytopenia (HCC)  -  C/o intermittent genitalia pain and orange-red colored urine X 4-5 months. Ddx stone renal calculi vs UTI (low suspicion d/t neg UTI symptoms)  - 09/11 labs: WBC 1.99  from 3.4 (baseline ~ 4), Hb 9.1 from 11, plt 183 from 203 - could be dilutional from fluids  -  labs:  WBC 1.66 Hb 9.3 Plt 183,. UA neg for nitrite, culture pending      Plan:  - F/U AM CBC  - Rhem consulted, appreciate recs  CKD stage G3a/A2, GFR 45-59 and albumin creatinine ratio  mg/g ([facility])  Lab Results   Component Value Date    EGFR 53 2024    EGFR 50 2024    EGFR 40 09/10/2024    CREATININE 1.14 2024    CREATININE 1.20 2024    CREATININE 1.43 (H) 09/10/2024       VTE Pharmacologic Prophylaxis: VTE Score: 3 High Risk (Score >/= 5) - Pharmacological DVT Prophylaxis Ordered: heparin. Sequential Compression Devices Ordered.    Mobility:   Basic Mobility Inpatient Raw Score: 24  JH-HLM Goal: 8: Walk 250 feet or more  JH-HLM Achieved: 7: Walk 25 feet or more  JH-HLM Goal achieved. Continue to encourage appropriate mobility.    Patient Centered Rounds: I performed bedside rounds with nursing staff today.   Discussions with Specialists or Other Care Team Provider: Nrphrology    Education and Discussions with Family / Patient: Updated ex- on the phone , answered all questions.    Current Length of Stay: 2 day(s)  Current Patient Status: Inpatient   Certification Statement:   Discharge Plan:     Code Status: Level 1 - Full Code    Subjective     No fever/chills/rigor, cp, palpitation, sob, n/v/diarrhea. Eating and sleep okay.    Denies frequency, urgency or burning while urinating. No fresh blood or clot in urine. Urine was orange colored yesterday, but is clear today.         Objective     Vitals:   Temp (24hrs), Av.2 °F (36.8 °C), Min:97.6 °F (36.4 °C), Max:98.5 °F (36.9 °C)    Temp:  [97.6 °F (36.4 °C)-98.5 °F (36.9 °C)] 98.5 °F (36.9 °C)  HR:  [73-79] 73  Resp:  [17] 17  BP: (114-130)/(50-66) 123/50  SpO2:  [95 %-98 %] 95 %  Body mass index is 22.13 kg/m².     Input and Output Summary (last 24 hours):     Intake/Output Summary (Last 24 hours) at 2024 0830  Last data  filed at 9/11/2024 2000  Gross per 24 hour   Intake 670 ml   Output 800 ml   Net -130 ml       Physical Exam  Constitutional:       Appearance: She is ill-appearing.   HENT:      Head: Normocephalic and atraumatic.      Mouth/Throat:      Mouth: Mucous membranes are moist.   Cardiovascular:      Rate and Rhythm: Normal rate and regular rhythm.      Pulses: Normal pulses.      Heart sounds: Normal heart sounds.   Pulmonary:      Effort: Pulmonary effort is normal.   Abdominal:      General: Abdomen is flat. Bowel sounds are normal.      Palpations: Abdomen is soft.   Skin:     General: Skin is warm.      Capillary Refill: Capillary refill takes less than 2 seconds.   Neurological:      Mental Status: She is alert. Mental status is at baseline.   Psychiatric:         Mood and Affect: Mood normal.          Lines/Drains:  Lines/Drains/Airways       Active Status       Name Placement date Placement time Site Days    Port A Cath 03/21/23 Right Internal jugular 03/21/23  1022  Internal jugular  540    Ileostomy Loop RLQ 08/17/23  1140  RLQ  391    Ureteral Internal Stent Left ureter 6 Fr. 07/21/23  0946  Left ureter  418    Ureteral Internal Stent Right ureter 7 Fr. 07/21/23  1004  Right ureter  418    Ureteral Internal Stent Right ureter 7 Fr. 10/11/23  1505  Right ureter  336    Ureteral Internal Stent Left ureter 6 Fr. 05/10/24  1236  Left ureter  124    Ureteral Internal Stent Right ureter 6 Fr. 05/10/24  1241  Right ureter  124                               Lab Results: I have reviewed the following results:    Results from last 7 days   Lab Units 09/12/24  0526   WBC Thousand/uL 1.66*   HEMOGLOBIN g/dL 9.3*   HEMATOCRIT % 28.4*   PLATELETS Thousands/uL 148*   SEGS PCT % 67   LYMPHO PCT % 10*   MONO PCT % 12   EOS PCT % 10*     Results from last 7 days   Lab Units 09/12/24  0716 09/11/24  0537 09/10/24  1722   SODIUM mmol/L 140   < > 133*   POTASSIUM mmol/L 3.4*   < > 3.8   CHLORIDE mmol/L 110*   < > 102   CO2 mmol/L  26   < > 26   BUN mg/dL 15   < > 25   CREATININE mg/dL 1.14   < > 1.43*   ANION GAP mmol/L 4   < > 5   CALCIUM mg/dL 10.9*   < > 13.4*   ALBUMIN g/dL  --   --  3.6   TOTAL BILIRUBIN mg/dL  --   --  0.73   ALK PHOS U/L  --   --  83   ALT U/L  --   --  41   AST U/L  --   --  51*   GLUCOSE RANDOM mg/dL 82   < > 125    < > = values in this interval not displayed.             Results from last 7 days   Lab Units 09/10/24  1305   HEMOGLOBIN A1C % 5.2           Recent Cultures (last 7 days):   Results from last 7 days   Lab Units 09/10/24  1305   URINE CULTURE  Culture too young- will reincubate       Imaging Review: No pertinent imaging studies reviewed.  Other Studies:     Last 24 Hours Medication List:     Current Facility-Administered Medications:     acetaminophen (TYLENOL) tablet 650 mg, Q6H PRN    amLODIPine (NORVASC) tablet 10 mg, Daily    cyanocobalamin (VITAMIN B-12) tablet 1,000 mcg, Daily    fenofibrate (TRICOR) tablet 145 mg, Daily    ferrous sulfate tablet 325 mg, Daily With Breakfast    heparin (porcine) subcutaneous injection 5,000 Units, Q8H STANLEY **AND** [COMPLETED] Platelet count, Once    ondansetron (ZOFRAN) injection 4 mg, Q6H PRN    oxybutynin (DITROPAN) tablet 5 mg, Q6H PRN    oxyCODONE (ROXICODONE) IR tablet 5 mg, Q4H PRN    potassium chloride (Klor-Con M20) CR tablet 40 mEq, BID    sodium chloride 0.9 % infusion, Continuous, Last Rate: 100 mL/hr (09/12/24 0534)    tamsulosin (FLOMAX) capsule 0.4 mg, Daily With Dinner    ursodiol (ACTIGALL) capsule 900 mg, Daily    Administrative Statements   Today, Patient Was Seen By: Roel Silva      **Please Note: This note may have been constructed using a voice recognition system.**

## 2024-09-12 NOTE — ASSESSMENT & PLAN NOTE
Lab Results   Component Value Date    EGFR 53 09/12/2024    EGFR 50 09/11/2024    EGFR 40 09/10/2024    CREATININE 1.14 09/12/2024    CREATININE 1.20 09/11/2024    CREATININE 1.43 (H) 09/10/2024   -- Outpatient nephrologist Dr. Reyes  -- Baseline creatinine low 1's  -- History of bilateral hydronephrosis.  -- CT scan showed improved right-sided hydronephrosis.  Double-J right ureteral stent.  Persistent mild left-sided hydroureteronephrosis with a double-J ureteral stent  --Renal function remained stable and at baseline

## 2024-09-12 NOTE — PLAN OF CARE
Problem: PAIN - ADULT  Goal: Verbalizes/displays adequate comfort level or baseline comfort level  Description: Interventions:  - Encourage patient to monitor pain and request assistance  - Assess pain using appropriate pain scale  - Administer analgesics based on type and severity of pain and evaluate response  - Implement non-pharmacological measures as appropriate and evaluate response  - Consider cultural and social influences on pain and pain management  - Notify physician/advanced practitioner if interventions unsuccessful or patient reports new pain  Outcome: Progressing     Problem: INFECTION - ADULT  Goal: Absence or prevention of progression during hospitalization  Description: INTERVENTIONS:  - Assess and monitor for signs and symptoms of infection  - Monitor lab/diagnostic results  - Monitor all insertion sites, i.e. indwelling lines, tubes, and drains  - Monitor endotracheal if appropriate and nasal secretions for changes in amount and color  - [place] appropriate cooling/warming therapies per order  - Administer medications as ordered  - Instruct and encourage patient and family to use good hand hygiene technique  - Identify and instruct in appropriate isolation precautions for identified infection/condition  Outcome: Progressing  Goal: Absence of fever/infection during neutropenic period  Description: INTERVENTIONS:  - Monitor WBC    Outcome: Progressing     Problem: SAFETY ADULT  Goal: Patient will remain free of falls  Description: INTERVENTIONS:  - Educate patient/family on patient safety including physical limitations  - Instruct patient to call for assistance with activity   - Consult OT/PT to assist with strengthening/mobility   - Keep Call bell within reach  - Keep bed low and locked with side rails adjusted as appropriate  - Keep care items and personal belongings within reach  - Initiate and maintain comfort rounds  - Make Fall Risk Sign visible to staff  - Offer Toileting every  Hours,  in advance of need  - Initiate/Maintain alarm  - Obtain necessary fall risk management equipment:   - Apply yellow socks and bracelet for high fall risk patients  - Consider moving patient to room near nurses station  Outcome: Progressing  Goal: Maintain or return to baseline ADL function  Description: INTERVENTIONS:  -  Assess patient's ability to carry out ADLs; assess patient's baseline for ADL function and identify physical deficits which impact ability to perform ADLs (bathing, care of mouth/teeth, toileting, grooming, dressing, etc.)  - Assess/evaluate cause of self-care deficits   - Assess range of motion  - Assess patient's mobility; develop plan if impaired  - Assess patient's need for assistive devices and provide as appropriate  - Encourage maximum independence but intervene and supervise when necessary  - Involve family in performance of ADLs  - Assess for home care needs following discharge   - Consider OT consult to assist with ADL evaluation and planning for discharge  - Provide patient education as appropriate  Outcome: Progressing  Goal: Maintains/Returns to pre admission functional level  Description: INTERVENTIONS:  - Perform AM-PAC 6 Click Basic Mobility/ Daily Activity assessment daily.  - Set and communicate daily mobility goal to care team and patient/family/caregiver.   - Collaborate with rehabilitation services on mobility goals if consulted  - Perform Range of Motion  times a day.  - Reposition patient every  hours.  - Dangle patient  times a day  - Stand patient  times a day  - Ambulate patient  times a day  - Out of bed to chair  times a day   - Out of bed for meals times a day  - Out of bed for toileting  - Record patient progress and toleration of activity level   Outcome: Progressing     Problem: DISCHARGE PLANNING  Goal: Discharge to home or other facility with appropriate resources  Description: INTERVENTIONS:  - Identify barriers to discharge w/patient and caregiver  - Arrange for  needed discharge resources and transportation as appropriate  - Identify discharge learning needs (meds, wound care, etc.)  - Arrange for interpretive services to assist at discharge as needed  - Refer to Case Management Department for coordinating discharge planning if the patient needs post-hospital services based on physician/advanced practitioner order or complex needs related to functional status, cognitive ability, or social support system  Outcome: Progressing     Problem: Knowledge Deficit  Goal: Patient/family/caregiver demonstrates understanding of disease process, treatment plan, medications, and discharge instructions  Description: Complete learning assessment and assess knowledge base.  Interventions:  - Provide teaching at level of understanding  - Provide teaching via preferred learning methods  Outcome: Progressing

## 2024-09-12 NOTE — ASSESSMENT & PLAN NOTE
-  C/o intermittent genitalia pain and orange-red colored urine X 4-5 months. Ddx stone renal calculi vs UTI (low suspicion d/t neg UTI symptoms)  - 09/11 labs: WBC 1.99 from 3.4 (baseline ~ 4), Hb 9.1 from 11, plt 183 from 203 - could be dilutional from fluids  - 9/12 labs:  WBC 1.66 Hb 9.3 Plt 183,. UA neg for nitrite, culture pending      Plan:  - F/U AM CBC  - Vishm consulted, appreciate recs

## 2024-09-12 NOTE — ASSESSMENT & PLAN NOTE
-- Blood pressure currently stable and controlled  --Continue amlodipine 10 mg daily  -- Currently on normal saline and euvolemic  --Continue current management

## 2024-09-12 NOTE — CONSULTS
Assessment   59 yo with hx if sarcoidosis not in remission and and rectal ca s/p resection who is admitted with symptomatic hypercalcemia.  Endocrinology consulted for hypercalcemia    Hypercalcemia  Chronic hypercalcemia previously in the 10s  Has recurrent kidney stones/complex urologic history requiring multiple stent placement.  Stone analysis revealing calcium oxalate and phosphate.  No excess oral ca or however otc vit d 1000, no diurectics  20 pound unexplained weight loss.  Lab work on admission   On admission serum calcium 13.9  Suppressed PTH 4.6- low suspicion for PTH disease  Elevated ionized ca,   Low 25 OH vit d low,   She has anemia( pancytopenia), UA with 1+ protein , THOR creat 1.4  Nephrology currently on board evaluating sarcoid etiology and ruling out MM  Patient was given normal saline 100 cc/h, calcium improved currently 10.9.    Plan   Follow up ACE,, one 25-hydroxy vitamin D, parathyroid related peptide  Follow-up workup for multiple myeloma given associated weight loss anemia/pancytopenia  Consider starting steroids            2.  Abnormal TFT     Lab Results   Component Value Date    SFF7QCUDXQFP 0.253 (L) 07/11/2023    FREET4 0.75 07/11/2023     Repeat TSH T4    3.  Adrenal nodule  1cm right adrenal - stable on imaging CT abdomen pelvis March 2024,   Not previous testing for adrenal function   Can test for adrenal function OPT          4. Sarcoidosis - hepatic biopsy proven and pulmonary   Patient previously on steroids slowly tapered down. Previously on prednisolone 5 mg which was stopped  several months ago  Given symptomatic hypercalcemia which may be 2./2 sarcoid flare steroids will be the best option     Appreciate Rheumatology input       5. Pancytopenia  Progressive pancytopenia which is evolving over the last 2 months  Known history of rectal cancer now in the remission,  Currently being evaluated for multiple myeloma given hypercalcemia  Consider hematology/oncology  evaluation      6.  Rectal cancer   status post diverting loop ileostomy as well as concurrent chemoradiation 2023.  Followed up by colorectal surgery and radiation oncology with no evidence of recurrence.  Denies any imaging with no metastatic disease June 2024.  Continue follow-up with primary team    History of Present Illness   Danielle Haque is a 58 y.o. female past medical history of rectal cancer s/p resection with diverting ileostomy in 2023 and status post and chemoradiation, adrenal nodule, NOLVIA, CKD, hepatic and pulmonary sarcoidosis  Patient admitted to [facility] on 9/10 for abnormal labs with hypocalcemia.  Patient reports she has been experiencing fatigue anorexia, weight loss.  Documented 20 pound weight loss in the past 6 months  Patient has profound anorexia, denies nausea vomiting no abdominal pain.  She has had decreased oral and fluid intake.  She also reported polydipsia, polyuria and increased frequency.  Admission calcium 13.9, started on IV fluids with significant improvement in calcium which is now 10.  She had an THOR with creatinine 1.4, [baseline in the 1's], mildly low vitamin D, calcium, low magnesium.  She has had workup for hypercalcemia including PTH which is significantly suppressed.  Nephrology is also following.  Endocrinology consulted for hypercalcemia      Review of Systems  Historical Information   Past Medical History:   Diagnosis Date    Bleeding from colostomy stoma (HCC)      states ileostomy , not colostomy    Cancer (HCC)     Colon cancer (HCC)     Elevated serum creatinine 09/11/2024    Fall     Headache     Hemochromatosis, unspecified     History of transfusion     2022 - no adverse reaction    Hypertension     Kidney calculi     Kidney stone     Liver disease     hemangioma    Muscle weakness     Personal history of COVID-19 12/2022    Sarcoidosis     Seizures (HCC)     2022    Shingles      Past Surgical History:   Procedure Laterality Date    ABSCESS DRAINAGE       left breast    BREAST BIOPSY      CHEST TUBE INSERTION      COLON SURGERY      colostomy    COLONOSCOPY      FL GUIDED CENTRAL VENOUS ACCESS DEVICE INSERTION  03/21/2023    FL RETROGRADE PYELOGRAM  01/23/2023    FL RETROGRADE PYELOGRAM  04/03/2023    FL RETROGRADE PYELOGRAM  7/21/2023    FL RETROGRADE PYELOGRAM  10/11/2023    FL RETROGRADE PYELOGRAM  12/15/2023    FL RETROGRADE PYELOGRAM  2/8/2024    FL RETROGRADE PYELOGRAM  5/10/2024    IR BIOPSY LIVER MASS  05/09/2023    LUNG BIOPSY      MI CYSTO BLADDER W/URETERAL CATHETERIZATION Bilateral 07/21/2023    Procedure: CYSTOSCOPY URETEROSCOPY RETROGRADE PYELOGRAM WITH BILATERAL STENT URETERAL EXCHANGE; LEFT LASER LITHOTRIPSY AND STONE BASKET RETRIEVAL;  Surgeon: José Luis Stephens MD;  [facility];  Service: Urology    MI CYSTO BLADDER W/URETERAL CATHETERIZATION Bilateral 12/15/2023    Procedure: CYSTOSCOPY, BILATERAL  RETROGRADE PYELOGRAM , STENT EXCHANGE RIGHT , LEFT URETEROSCOPY ,  HOLMIUM LASER WITH INSERTION LEFT URETERAL STENT;  Surgeon: Derick Bhakta MD;  [facility];  Service: Urology    MI CYSTO BLADDER W/URETERAL CATHETERIZATION Left 2/8/2024    Procedure: CYSTOSCOPY B/L RETROGRADE PYELOGRAM WITH B/L T URETERALSTENT EXCHANGE,LEFT URETEROSCOPY, DILATION LEFT URETERAL STICTURE;  Surgeon: José Luis Stephens MD;  [facility];  Service: Urology    MI CYSTO BLADDER W/URETERAL CATHETERIZATION Bilateral 5/10/2024    Procedure: CYSTOSCOPY RETROGRADE PYELOGRAM WITH INSERTION STENT URETERAL;  Surgeon: José Luis Stephens MD;  [facility];  Service: Urology    MI CYSTO W/INSERT URETERAL STENT Bilateral 5/10/2024    Procedure: EXCHANGE STENT URETERAL;  Surgeon: José Luis Stephens MD;  [facility];  Service: Urology    MI CYSTO/URETERO W/LITHOTRIPSY &INDWELL STENT INSRT Bilateral 01/23/2023    Procedure: CYSTOSCOPY  RIGHT URETEROSCOPY WITH LITHOTRIPSY HOLMIUM LASER, BILATERAL RETROGRADE PYELOGRAM AND  BILATERAL  URETERAL STENT INSERTION;  Surgeon: José Luis Stephens MD;  [facility];  Service: Urology    CT CYSTO/URETERO W/LITHOTRIPSY &INDWELL STENT INSRT Right 04/03/2023    Procedure: RIGHT URETEROSCOPY WITH LITHOTRIPSY HOLMIUM LASER, RETROGRADE PYELOGRAM; BILATERAL EXCHANGE STENT URETERAL;  Surgeon: José Luis Stephens MD;  [facility];  Service: Urology    CT CYSTO/URETERO W/LITHOTRIPSY &INDWELL STENT INSRT Bilateral 10/11/2023    Procedure: CYSTOSCOPY URETEROSCOPY RETROGRADE PYELOGRAM AND INSERTION STENT URETERAL DIAGNOSTINC RIGHT URETEROSCOPY, REMOVAL STENT LEFT SIDE;  Surgeon: José Luis Stephens MD;  [facility];  Service: Urology    CT INSJ TUNNELED CTR VAD W/SUBQ PORT AGE 5 YR/> N/A 03/21/2023    Procedure: INSERTION VENOUS PORT ( PORT-A-CATH) IR;  Surgeon: Mark Amos DO;  [facility]: [facility];  Service: Interventional Radiology    CT LAPS COLECTOMY PRTL W/COLOPXTSTMY LW ANAST N/A 8/17/2023    Procedure: RESECTION COLON LOW ANTERIOR LAPAROSCOPIC WITH ROBOTICS;  Surgeon: Sree Umanzor MD;  [facility];  Service: Colorectal    TONSILLECTOMY      URINARY SURGERY Bilateral     bilateral stents     Social History     Tobacco Use    Smoking status: Never     Passive exposure: Past    Smokeless tobacco: Never   Vaping Use    Vaping status: Never Used   Substance and Sexual Activity    Alcohol use: Never    Drug use: Never    Sexual activity: Not Currently     E-Cigarette/Vaping    E-Cigarette Use Never User      E-Cigarette/Vaping Substances    Nicotine No     THC No     CBD No     Flavoring No     Other No     Unknown No      Family History   Problem Relation Age of Onset    Cancer Mother     Cirrhosis Father     Breast cancer Neg Hx     Breast cancer additional onset Neg Hx      Social History     Tobacco Use    Smoking status: Never     Passive exposure: Past    Smokeless tobacco: Never   Vaping Use    Vaping status: Never Used   Substance and  Sexual Activity    Alcohol use: Never    Drug use: Never    Sexual activity: Not Currently       Current Facility-Administered Medications:     acetaminophen (TYLENOL) tablet 650 mg, Q6H PRN    amLODIPine (NORVASC) tablet 10 mg, Daily    cyanocobalamin (VITAMIN B-12) tablet 1,000 mcg, Daily    fenofibrate (TRICOR) tablet 145 mg, Daily    ferrous sulfate tablet 325 mg, Daily With Breakfast    heparin (porcine) subcutaneous injection 5,000 Units, Q8H STANLEY **AND** [COMPLETED] Platelet count, Once    ondansetron (ZOFRAN) injection 4 mg, Q6H PRN    oxybutynin (DITROPAN) tablet 5 mg, Q6H PRN    oxyCODONE (ROXICODONE) IR tablet 5 mg, Q4H PRN    potassium chloride (Klor-Con M20) CR tablet 40 mEq, BID    tamsulosin (FLOMAX) capsule 0.4 mg, Daily With Dinner    ursodiol (ACTIGALL) capsule 900 mg, Daily  Prior to Admission Medications   Prescriptions Last Dose Informant Patient Reported? Taking?   acetaminophen (TYLENOL) 325 mg tablet  Self No No   Sig: Take 2 tablets (650 mg total) by mouth every 4 (four) hours as needed for mild pain   Patient not taking: Reported on 8/29/2024   acetaminophen (TYLENOL) 500 mg tablet  Self No No   Sig: Take 2 tablets (1,000 mg total) by mouth 3 (three) times a day as needed for mild pain, moderate pain, headaches or fever   Patient not taking: Reported on 8/29/2024   amLODIPine (NORVASC) 10 mg tablet  Self No No   Sig: TAKE 1 TABLET BY MOUTH EVERY DAY   cholecalciferol 400 units tablet  Self Yes No   Sig: Take 400 Units by mouth daily   cyanocobalamin (VITAMIN B-12) 1000 MCG tablet  Self Yes No   Sig: Take 1,000 mcg by mouth daily   estrogens, conjugated (Premarin) vaginal cream  Self No No   Sig: Insert 0.3 g into the vagina 3 (three) times a week   fenofibrate (TRICOR) 145 mg tablet  Self No No   Sig: Take 1 tablet (145 mg total) by mouth daily   ferrous sulfate 324 (65 Fe) mg  Self No No   Sig: Take 1 tablet (324 mg total) by mouth daily before breakfast   ondansetron (ZOFRAN) 4 mg tablet    No No   Sig: Take 1 tablet (4 mg total) by mouth every 8 (eight) hours as needed for nausea or vomiting   oxyCODONE (Roxicodone) 5 immediate release tablet   No No   Sig: Take 1 tablet (5 mg total) by mouth every 4 (four) hours as needed for moderate pain Max Daily Amount: 30 mg   oxybutynin (DITROPAN) 5 mg tablet  Self No No   Sig: Take 1 tablet (5 mg total) by mouth every 6 (six) hours as needed (bladder spasms)   Patient not taking: Reported on 7/30/2024   oxybutynin (DITROPAN) 5 mg tablet   No No   Sig: Take 1 tablet (5 mg total) by mouth every 6 (six) hours as needed (bladder spasms)   phenazopyridine (PYRIDIUM) 200 mg tablet  Self No No   Sig: Take 1 tablet (200 mg total) by mouth 3 (three) times a day as needed for bladder spasms   Patient not taking: Reported on 7/25/2024   potassium chloride (MICRO-K) 10 MEQ CR capsule   No No   Sig: TAKE 4 CAPSULES (40 MEQ TOTAL) BY MOUTH 2 (TWO) TIMES A DAY   tamsulosin (FLOMAX) 0.4 mg   No No   Sig: Take 1 capsule (0.4 mg total) by mouth daily with dinner   ursodiol (ACTIGALL) 300 mg capsule   No No   Sig: TAKE 3 CAPSULES (900 MG TOTAL) BY MOUTH IN THE MORNING      Facility-Administered Medications: None       Objective      Temp:  [97.6 °F (36.4 °C)-98.5 °F (36.9 °C)] 98.5 °F (36.9 °C)  HR:  [73-79] 73  Resp:  [17] 17  BP: (114-130)/(50-66) 123/50  O2 Device: None (Room air)          I/O last 24 hours:  In: 910 [P.O.:900; I.V.:10]  Out: 800 [Urine:800]  Lines/Drains/Airways       Active Status       Name Placement date Placement time Site Days    Port A Cath 03/21/23 Right Internal jugular 03/21/23  1022  Internal jugular  541    Ileostomy Loop RLQ 08/17/23  1140  RLQ  392    Ureteral Internal Stent Left ureter 6 Fr. 07/21/23  0946  Left ureter  419    Ureteral Internal Stent Right ureter 7 Fr. 07/21/23  1004  Right ureter  419    Ureteral Internal Stent Right ureter 7 Fr. 10/11/23  1505  Right ureter  336    Ureteral Internal Stent Left ureter 6 Fr. 05/10/24  1236   "Left ureter  125    Ureteral Internal Stent Right ureter 6 Fr. 05/10/24  1241  Right ureter  125                  Physical Exam  Vitals and nursing note reviewed.   Constitutional:       General: She is not in acute distress.     Appearance: She is well-developed. She is not ill-appearing or toxic-appearing.   HENT:      Head: Normocephalic and atraumatic.   Eyes:      Conjunctiva/sclera: Conjunctivae normal.   Neck:      Thyroid: No thyroid mass or thyroid tenderness.   Cardiovascular:      Rate and Rhythm: Normal rate and regular rhythm.      Heart sounds: No murmur heard.  Pulmonary:      Effort: Pulmonary effort is normal. No respiratory distress.      Breath sounds: Normal breath sounds.   Abdominal:      Palpations: Abdomen is soft.      Tenderness: There is no abdominal tenderness.      Comments: Ostomy bag in place   Musculoskeletal:         General: No swelling.      Cervical back: Neck supple.   Lymphadenopathy:      Cervical: No cervical adenopathy.   Skin:     General: Skin is warm and dry.      Capillary Refill: Capillary refill takes less than 2 seconds.   Neurological:      Mental Status: She is alert.   Psychiatric:         Mood and Affect: Mood normal.          Lab Results: I have reviewed the following results: Creatinine Clearance: Estimated Creatinine Clearance: 43.6 mL/min (by C-G formula based on SCr of 1.14 mg/dL)., LFTs: No new results in last 24 hours. , Iron: No results found for: \"IRON\", Lipid Profile:     .cbc    Imntains abnormal data Basic metabolic panel  Order: [id number]   Status: Final result       Visible to patient: Yes (seen)       Next appt: 09/18/2024 at 09:30 AM in Infusion Therapy ([facility])    0 Result Notes            Component  Ref Range & Units 9/12/24  7:16 AM 9/11/24  5:37 AM 9/10/24  5:22 PM 9/10/24  1:05 PM 8/15/24  7:30 AM 7/17/24 10:42 AM 6/17/24 10:45 AM   Sodium  135 - 147 mmol/L 140 140 133 Low  135 140 139 136   Potassium  3.5 - 5.3 mmol/L 3.4 Low  3.7 3.8 " 4.2 4.3 3.6 4.2   Chloride  96 - 108 mmol/L 110 High  110 High  102 102 104 108 107   CO2  21 - 32 mmol/L 26 25 26 26 28 26 21   ANION GAP  4 - 13 mmol/L 4 5 5 7 8 5 8   BUN  5 - 25 mg/dL 15 19 25 24 25 23 38 High    Creatinine  0.60 - 1.30 mg/dL 1.14 1.20 CM 1.43 High  CM 1.34 High  CM 1.09 CM 1.11 CM 1.31 High  CM   Comment: Standardized to IDMS reference method   Glucose  65 - 140 mg/dL 82 83   CM  88 CM 92 CM   Comment: If the patient is fasting, the ADA then defines impaired fasting glucose as > 100 mg/dL and diabetes as > or equal to 123 mg/dL.   Calcium  8.4 - 10.2 mg/dL 10.9 High  11.6 High  CM 13.4 High Panic  13.2 High Panic  10.2 9.7 11.4 High  CM   Comment: verified by repeat analysis.   eGFR  ml/min/1.73sq m 53 50 40 44 56 55 45              Narrative    National Kid[employer] Disease Foundation guidelines for Chronic Kidney Disease (CKD):    Stage 1 with normal or high GFR (GFR > 90 mL/min/1.73 square meters)    Stage 2 Mild CKD (GFR = 60-89 mL/min/1.73 square meters)    Stage 3A Moderate CKD (GFR = 45-59 mL/min/1.73 square meters)    Stage 3B Moderate CKD (GFR = 30-44 mL/min/1.73 square meters)    Stage 4 Severe CKD (GFR = 15-29 mL/min/1.73 square meters)    Stage 5 End Stage CKD (GFR <15 mL/min/1.73 square meters)  Note: GFR calculation is accurate only with a steady state creatinine      Specimen Collected: 09/12/24  7:16 AM Last Resulted: 09/12/24  8:28 AM           CBC and differential  Order: [id number]   Status: Final result       Visible to patient: Yes (seen)       Next appt: 09/18/2024 at 09:30 AM in Infusion Therapy ([facility])    0 Result Notes            Component  Ref Range & Units 9/12/24  5:26 AM 9/11/24  5:37 AM 9/10/24 10:25 PM 9/10/24  5:22 PM 8/15/24  7:30 AM 7/17/24 10:42 AM 6/17/24 10:45 AM   WBC  4.31 - 10.16 Thousand/uL 1.66 Low  1.99 Low   3.40 Low  4.77 4.90 5.94   RBC  3.81 - 5.12 Million/uL 3.25 Low  3.19 Low   3.24 Low  3.89 3.52 Low  4.01   Hemoglobin  11.5 - 15.4  g/dL 9.3 Low  9.1 Low   9.3 Low  11.1 Low  10.0 Low  11.2 Low    Hematocrit  34.8 - 46.1 % 28.4 Low  28.2 Low   28.5 Low  34.8 31.1 Low  34.3 Low    MCV  82 - 98 fL 87 88  88 90 88 86   MCH  26.8 - 34.3 pg 28.6 28.5  28.7 28.5 28.4 27.9   MCHC  31.4 - 37.4 g/dL 32.7 32.3  32.6 31.9 32.2 32.7   RDW  11.6 - 15.1 % 13.0 12.9  12.9 13.4 13.8 13.7   MPV  8.9 - 12.7 fL 9.5 9.6 9.7 9.5 9.6 9.2 9.3   Platelets  149 - 390 Thousands/uL 148 Low  183 203 192 251 208 258   nRBC  /100 WBCs 0   0 0 0 0   Segmented %  43 - 75 % 67   74 80 High  77 High  79 High    Immature Grans %  0 - 2 % 0   0 0 1 1   Lymphocytes %  14 - 44 % 10 Low    8 Low  7 Low  7 Low  6 Low    Monocytes %  4 - 12 % 12   9 7 8 8   Eosinophils Relative  0 - 6 % 10 High    9 High  6 7 High  5   Basophils Relative  0 - 1 % 1   0 0 0 1   Absolute Neutrophils  1.85 - 7.62 Thousands/µL 1.11 Low    2.50 3.82 3.79 4.75   Absolute Immature Grans  0.00 - 0.20 Thousand/uL 0.00   0.01 0.02 0.03 0.03   Absolute Lymphocytes  0.60 - 4.47 Thousands/µL 0.17 Low    0.26 Low  0.32 Low  0.33 Low  0.37 Low    Absolute Monocytes  0.17 - 1.22 Thousand/µL 0.20   0.30 0.31 0.37 0.45   Eosinophils Absolute  0.00 - 0.61 Thousand/µL 0.17   0.32 0.29 0.36 0.31   Basophils Absolute  0.00 - 0.10 Thousands/µL 0.01   0.01 0.0                aging Review: Reviewed radiology reports from this admission including: CT abdomen/pelvis.

## 2024-09-12 NOTE — ASSESSMENT & PLAN NOTE
-History of biopsy proven sarcoidosis in the lung and liver.    -Follows with Dr. Machuca (GI) in the outpatient setting.    -Currently on ursodiol 900 mg daily.  -Was on chronic prednisone which was tapered 2 weeks ago for her to undergo ileostomy reversal   surgery.     Plan:  -Continue ursodiol 90 mg daily.  - Rheumatology consulted, appreciate recs.

## 2024-09-12 NOTE — CONSULTS
Rheumatology Inpatient Consult   Danielle Haque 58 y.o. female MRN: [id number]  Unit/Bed#: W -01 Encounter: [id number]    DATE: 9/12/2024    Reason for Consult: concern for hypercalcemia 2/2 sarcoidosis     Assessment and Plan:  Hypercalcemia 2/2 Sarcoidosis   Pt presented with elevated calcium levels more than prior lab results, does not seem to be clearly related to malignancy, PTH is suppressed, no other likely etiology at this time aside from the sarcoidosis. She had been on steroids in the past but it was tapered off in anticipation of upcoming procedures for ileostomy reversal surgery and ureteral stent exchange. No clinical symptoms of the sarcoidosis such as SOB, rashes, joint pains, lymphadenopathy etc., at this time.   Plan:  -f/u ACE and Vitamin D 1,25 OH levels  -Recommend steroid taper with prednisone 40 mg daily x 5 days, decrease by 10 mg every week, then off.   -She will follow up with us outpatient for further management of her sarcoidosis  -recommend outpatient Ophthalmology referral to evaluate for uveitis  -outpatient follow up with Pulmonology and GI   -we do not have any contraindications to her upcoming surgical procedures    History of Present Illness:  Danielle Haque is a 58 y.o. female HTN, NOLVIA, adrenal nodule, stage IIIa CKD, colon cancer s/p diverting ileostomy in 2023 in remission, and pulmonary and hepatic sarcoidosis,who presents with hypercalcemia. She was given fluids which improved the calcium levels.    She was previously on steroids for sarcoidosis but it was held for a future surgery. She had a liver biopsy showing non-necrotizing granulomas in the past. She has had pulmonary biopsy done in the past as well. She was seen by our Rheumatology team in July 2023 one time to evaluate in the setting of elevated LFTs to see if this could be from her sarcoidosis, however at that time she did not have any sarcoid arthropathy and already was on prednisone taper with lab monitoring  "from GI.  She stopped her steroids about 2 weeks prior. Her calcium levels have been elevated since April 2024.    Nephrology is following as well.     ACE level was 117 in August (elevated), 101/106 in May.     For her sarcoid, no SOB , cough, rashes, vision in the left side it is painful and red at night mostly, no joint pain or swelling.     She has been having chest and lower back pain, episodic.     Denies constipation, abdominal pain etc.     She has an ileostomy reversal surgery planned for later in September as well as ureteral stent exchange.         Review of Systems  Review of Systems   Constitutional:  Positive for appetite change and fatigue.   HENT: Negative.     Eyes:  Positive for pain and redness.   Respiratory: Negative.     Cardiovascular: Negative.    Gastrointestinal: Negative.    Genitourinary: Negative.    Musculoskeletal: Negative.        Allergies  Allergies   Allergen Reactions    Oxaliplatin Shortness Of Breath     Reactions occurred with 2nd and 3rd infusions (about 1-3 hours from initiation of infusion) and required treatment with steroids and antihistamines. Please refer to allergy note on 2/7/2023 for detailed description of her reactions.    Morphine Nausea Only     \"Every dose made me nauseous\" (oral dosing only, can tolerate IV morphine)    Potassium Chloride Other (See Comments)     Pt reports \"burning\" with Iv potassium administration and wishes for it to be added to chart       Current Medications  Current Facility-Administered Medications   Medication Dose Route Frequency Provider Last Rate    acetaminophen  650 mg Oral Q6H PRN Lilliam Teixeira MD      amLODIPine  10 mg Oral Daily Lilliam Teixeira MD      cyanocobalamin  1,000 mcg Oral Daily Lilliam Teixeira MD      fenofibrate  145 mg Oral Daily Lilliam Teixeira MD      ferrous sulfate  325 mg Oral Daily With Breakfast Lilliam Teixeira MD      heparin (porcine)  5,000 Units Subcutaneous Q8H [facility] Lilliam Teixeira MD      ondansetron  4 mg " Intravenous Q6H PRN Lilliam Teixeira MD      oxybutynin  5 mg Oral Q6H PRN Lilliam Teixeira MD      oxyCODONE  5 mg Oral Q4H PRN Lilliam Teixeira MD      potassium chloride  40 mEq Oral BID Lilliam Teixeira MD      tamsulosin  0.4 mg Oral Daily With Dinner Lilliam Teixeira MD      ursodiol  900 mg Oral Daily Lilliam Teixeira MD         Past Medical History  Past Medical History:   Diagnosis Date    Bleeding from colostomy stoma (HCC)      states ileostomy , not colostomy    Cancer (HCC)     Colon cancer (HCC)     Elevated serum creatinine 09/11/2024    Fall     Headache     Hemochromatosis, unspecified     History of transfusion     2022 - no adverse reaction    Hypertension     Kidney calculi     Kidney stone     Liver disease     hemangioma    Muscle weakness     Personal history of COVID-19 12/2022    Sarcoidosis     Seizures (HCC)     2022    Shingles        Past Surgical History  Past Surgical History:   Procedure Laterality Date    ABSCESS DRAINAGE      left breast    BREAST BIOPSY      CHEST TUBE INSERTION      COLON SURGERY      colostomy    COLONOSCOPY      FL GUIDED CENTRAL VENOUS ACCESS DEVICE INSERTION  03/21/2023    FL RETROGRADE PYELOGRAM  01/23/2023    FL RETROGRADE PYELOGRAM  04/03/2023    FL RETROGRADE PYELOGRAM  7/21/2023    FL RETROGRADE PYELOGRAM  10/11/2023    FL RETROGRADE PYELOGRAM  12/15/2023    FL RETROGRADE PYELOGRAM  2/8/2024    FL RETROGRADE PYELOGRAM  5/10/2024    IR BIOPSY LIVER MASS  05/09/2023    LUNG BIOPSY      DE CYSTO BLADDER W/URETERAL CATHETERIZATION Bilateral 07/21/2023    Procedure: CYSTOSCOPY URETEROSCOPY RETROGRADE PYELOGRAM WITH BILATERAL STENT URETERAL EXCHANGE; LEFT LASER LITHOTRIPSY AND STONE BASKET RETRIEVAL;  Surgeon: José Luis Stephens MD;  [facility];  Service: Urology    DE CYSTO BLADDER W/URETERAL CATHETERIZATION Bilateral 12/15/2023    Procedure: CYSTOSCOPY, BILATERAL  RETROGRADE PYELOGRAM , STENT EXCHANGE RIGHT , LEFT URETEROSCOPY ,  HOLMIUM LASER WITH  INSERTION LEFT URETERAL STENT;  Surgeon: Derick Bhakta MD;  [facility];  Service: Urology    VA CYSTO BLADDER W/URETERAL CATHETERIZATION Left 2/8/2024    Procedure: CYSTOSCOPY B/L RETROGRADE PYELOGRAM WITH B/L T URETERALSTENT EXCHANGE,LEFT URETEROSCOPY, DILATION LEFT URETERAL STICTURE;  Surgeon: José Luis Stephens MD;  [facility];  Service: Urology    VA CYSTO BLADDER W/URETERAL CATHETERIZATION Bilateral 5/10/2024    Procedure: CYSTOSCOPY RETROGRADE PYELOGRAM WITH INSERTION STENT URETERAL;  Surgeon: José Luis Stephens MD;  [facility];  Service: Urology    VA CYSTO W/INSERT URETERAL STENT Bilateral 5/10/2024    Procedure: EXCHANGE STENT URETERAL;  Surgeon: José Luis Stephens MD;  [facility];  Service: Urology    VA CYSTO/URETERO W/LITHOTRIPSY &INDWELL STENT INSRT Bilateral 01/23/2023    Procedure: CYSTOSCOPY  RIGHT URETEROSCOPY WITH LITHOTRIPSY HOLMIUM LASER, BILATERAL RETROGRADE PYELOGRAM AND BILATERAL  URETERAL STENT INSERTION;  Surgeon: José Luis Stephens MD;  [facility];  Service: Urology    VA CYSTO/URETERO W/LITHOTRIPSY &INDWELL STENT INSRT Right 04/03/2023    Procedure: RIGHT URETEROSCOPY WITH LITHOTRIPSY HOLMIUM LASER, RETROGRADE PYELOGRAM; BILATERAL EXCHANGE STENT URETERAL;  Surgeon: José Luis Stephens MD;  [facility];  Service: Urology    VA CYSTO/URETERO W/LITHOTRIPSY &INDWELL STENT INSRT Bilateral 10/11/2023    Procedure: CYSTOSCOPY URETEROSCOPY RETROGRADE PYELOGRAM AND INSERTION STENT URETERAL DIAGNOSTINC RIGHT URETEROSCOPY, REMOVAL STENT LEFT SIDE;  Surgeon: José Luis Stephens MD;  [facility];  Service: Urology    VA INSJ TUNNELED CTR VAD W/SUBQ PORT AGE 5 YR/> N/A 03/21/2023    Procedure: INSERTION VENOUS PORT ( PORT-A-CATH) IR;  Surgeon: Mark Amos DO;  [facility];  Service: Interventional Radiology    VA LAPS COLECTOMY PRTL W/COLOPXTSTMY LW ANAST N/A 8/17/2023    Procedure:  "RESECTION COLON LOW ANTERIOR LAPAROSCOPIC WITH ROBOTICS;  Surgeon: Sree Umanzor MD;  [facility];  Service: Colorectal    TONSILLECTOMY      URINARY SURGERY Bilateral     bilateral stents       Family History  No known autoimmune or inflammatory diseases in the family.   Family History   Problem Relation Age of Onset    Cancer Mother     Cirrhosis Father     Breast cancer Neg Hx     Breast cancer additional onset Neg Hx        Social History  Occupation:   Social History     Substance and Sexual Activity   Alcohol Use Never     Social History     Substance and Sexual Activity   Drug Use Never     Social History     Tobacco Use   Smoking Status Never    Passive exposure: Past   Smokeless Tobacco Never          Objective:  /50   Pulse 73   Temp 98.5 °F (36.9 °C)   Resp 17   Ht 5' 2\" (1.575 m)   Wt 54.9 kg (121 lb)   SpO2 95%   BMI 22.13 kg/m²     PHYSICAL EXAM   Limited as this was a virtual consultation over [employer] Teams Virtual Rounding [place],  Physical Exam  Constitutional:       Appearance: Normal appearance.   Eyes:      Extraocular Movements: Extraocular movements intact.      Pupils: Pupils are equal, round, and reactive to light.   Neurological:      Mental Status: She is alert and oriented to person, place, and time.   Psychiatric:         Mood and Affect: Mood normal.         Thought Content: Thought content normal.            Lab Results: I have personally reviewed pertinent reports.      CBC:   Results from last 7 days   Lab Units 09/12/24  0526   WBC Thousand/uL 1.66*   RBC Million/uL 3.25*   HEMOGLOBIN g/dL 9.3*   HEMATOCRIT % 28.4*   MCV fL 87   MCH pg 28.6   MCHC g/dL 32.7   RDW % 13.0   MPV fL 9.5   PLATELETS Thousands/uL 148*   NRBC AUTO /100 WBCs 0   SEGS PCT % 67   LYMPHO PCT % 10*   MONO PCT % 12   EOS PCT % 10*   BASOS PCT % 1   TOTAL NEUT ABS Thousands/µL 1.11*   LYMPHS ABS Thousands/µL 0.17*   MONOS ABS Thousand/µL 0.20   EOS ABS Thousand/µL 0.17   , Chemistry " Profile:   Results from last 7 days   Lab Units 09/12/24  0716 09/11/24  0537 09/10/24  1722   POTASSIUM mmol/L 3.4*   < > 3.8   CHLORIDE mmol/L 110*   < > 102   CO2 mmol/L 26   < > 26   BUN mg/dL 15   < > 25   CREATININE mg/dL 1.14   < > 1.43*   CALCIUM mg/dL 10.9*   < > 13.4*   AST U/L  --   --  51*   ALT U/L  --   --  41   ALK PHOS U/L  --   --  83   EGFR ml/min/1.73sq m 53   < > 40    < > = values in this interval not displayed.   , Coagulation Studies:   , Cardiac Studies:     Lab Results   Component Value Date    CRP 8.0 (H) 08/15/2024    BLANCA Negative 06/30/2023    RF Negative 07/26/2023    HAV Reactive (A) 06/30/2023    HEPBCAB Non-reactive 06/30/2023    HEPCAB Non-reactive 06/30/2023     Results from last 7 days   Lab Units 09/11/24  1449   COLOR UA  Light Yellow   CLARITY UA  Turbid   SPEC GRAV UA  1.010   PH UA  6.0   LEUKOCYTES UA  Large*   NITRITE UA  Negative   GLUCOSE UA mg/dl Negative   KETONES UA mg/dl Negative   BILIRUBIN UA  Negative   BLOOD UA  Large*      Results from last 7 days   Lab Units 09/11/24  1449   RBC UA /hpf 30-50*   WBC UA /hpf 30-50*   EPITHELIAL CELLS WET PREP /hpf Occasional   BACTERIA UA /hpf Occasional       Imaging: I have personally reviewed pertinent films in PACS

## 2024-09-12 NOTE — ASSESSMENT & PLAN NOTE
Lab Results   Component Value Date    EGFR 53 09/12/2024    EGFR 50 09/11/2024    EGFR 40 09/10/2024    CREATININE 1.14 09/12/2024    CREATININE 1.20 09/11/2024    CREATININE 1.43 (H) 09/10/2024

## 2024-09-12 NOTE — ASSESSMENT & PLAN NOTE
-- Recurrent issue  -- Present on admission, calcium was 13.2 peaking at 13.4  -- Ionized calcium level high at 1.67 on admission, ionized calcium level has improved to 1.46  -- PTH is suppressed, less likely a parathyroid issue  -- Follow-up serum protein electrophoresis, check PTH related peptide, vitamin D 1, 25 dihydroxy level, and angiotensin-converting enzyme given her history of sarcoidosis  -- Angiotensin-converting enzyme from last month was elevated at 117 back in August  -- Had been on steroids in the past currently off steroids.  Steroids on hold for future surgery  --Total serum calcium level improved to 10.9  -- Trend daily ionized calcium levels  -- Continue volume resuscitation with normal saline

## 2024-09-12 NOTE — ASSESSMENT & PLAN NOTE
-- Potassium 3.4, likely secondary to iatrogenic normal saline administration  -- Currently on potassium chloride 40 mEq twice daily continue this for now

## 2024-09-12 NOTE — ASSESSMENT & PLAN NOTE
-Elevated calcium to 13.2 on outpatient labs.Sent to [facility] for management of hypercalcemia.  -Endorses frequent urination and thirst.  Endorses fatigue and loss of appetite. Hx of non-obstructing renal calculi.  -Patient with history of biopsy-proven sarcoidosis which was managed with prednisone.  -Ddx: Hypercalcemia likely in the setting of sarcoidosis flareup.  - 9/11: Na 140 K 3.7 Ca 11.6 Cr Mg 1.7 PTH 4.7 WBC 1.99 from 3.4, Hb 9.1 from 11.1 Plt 183,  LFT: T bill .73, Direct apple 0.3     AST 83 ALT 41 Total protein 6.3, Alb 3.6  - 9/12:  WBC 1.66 Hb 9.3 Plt 183 Na 140 K 3.4 Ca 10.9 Cr 1.4 BUN 15    Plan:  -IV sodium chloride at 100 cc/h.  - Follow-up BMP, ionized calcium, 25 OH and 1, 25 OH Vit D levels, ACE  - Check protein electrophoresis  - Nephrology consulted, appreciate recs

## 2024-09-12 NOTE — PLAN OF CARE
Problem: PAIN - ADULT  Goal: Verbalizes/displays adequate comfort level or baseline comfort level  Description: Interventions:  - Encourage patient to monitor pain and request assistance  - Assess pain using appropriate pain scale  - Administer analgesics based on type and severity of pain and evaluate response  - Implement non-pharmacological measures as appropriate and evaluate response  - Consider cultural and social influences on pain and pain management  - Notify physician/advanced practitioner if interventions unsuccessful or patient reports new pain  Outcome: Progressing     Problem: INFECTION - ADULT  Goal: Absence or prevention of progression during hospitalization  Description: INTERVENTIONS:  - Assess and monitor for signs and symptoms of infection  - Monitor lab/diagnostic results  - Monitor all insertion sites, i.e. indwelling lines, tubes, and drains  - Monitor endotracheal if appropriate and nasal secretions for changes in amount and color  - [place] appropriate cooling/warming therapies per order  - Administer medications as ordered  - Instruct and encourage patient and family to use good hand hygiene technique  - Identify and instruct in appropriate isolation precautions for identified infection/condition  Outcome: Progressing  Goal: Absence of fever/infection during neutropenic period  Description: INTERVENTIONS:  - Monitor WBC    Outcome: Progressing     Problem: SAFETY ADULT  Goal: Patient will remain free of falls  Description: INTERVENTIONS:  - Educate patient/family on patient safety including physical limitations  - Instruct patient to call for assistance with activity   - Consult OT/PT to assist with strengthening/mobility   - Keep Call bell within reach  - Keep bed low and locked with side rails adjusted as appropriate  - Keep care items and personal belongings within reach  - Initiate and maintain comfort rounds  - Make Fall Risk Sign visible to staff  - Offer Toileting every  Hours,  in advance of need  - Initiate/Maintain alarm  - Obtain necessary fall risk management equipment:   - Apply yellow socks and bracelet for high fall risk patients  - Consider moving patient to room near nurses station  Outcome: Progressing  Goal: Maintain or return to baseline ADL function  Description: INTERVENTIONS:  -  Assess patient's ability to carry out ADLs; assess patient's baseline for ADL function and identify physical deficits which impact ability to perform ADLs (bathing, care of mouth/teeth, toileting, grooming, dressing, etc.)  - Assess/evaluate cause of self-care deficits   - Assess range of motion  - Assess patient's mobility; develop plan if impaired  - Assess patient's need for assistive devices and provide as appropriate  - Encourage maximum independence but intervene and supervise when necessary  - Involve family in performance of ADLs  - Assess for home care needs following discharge   - Consider OT consult to assist with ADL evaluation and planning for discharge  - Provide patient education as appropriate  Outcome: Progressing  Goal: Maintains/Returns to pre admission functional level  Description: INTERVENTIONS:  - Perform AM-PAC 6 Click Basic Mobility/ Daily Activity assessment daily.  - Set and communicate daily mobility goal to care team and patient/family/caregiver.   - Collaborate with rehabilitation services on mobility goals if consulted  - Perform Range of Motion  times a day.  - Reposition patient every  hours.  - Dangle patient  times a day  - Stand patient  times a day  - Ambulate patient  times a day  - Out of bed to chair  times a day   - Out of bed for meals  times a day  - Out of bed for toileting  - Record patient progress and toleration of activity level   Outcome: Progressing     Problem: DISCHARGE PLANNING  Goal: Discharge to home or other facility with appropriate resources  Description: INTERVENTIONS:  - Identify barriers to discharge w/patient and caregiver  - Arrange for  needed discharge resources and transportation as appropriate  - Identify discharge learning needs (meds, wound care, etc.)  - Arrange for interpretive services to assist at discharge as needed  - Refer to Case Management Department for coordinating discharge planning if the patient needs post-hospital services based on physician/advanced practitioner order or complex needs related to functional status, cognitive ability, or social support system  Outcome: Progressing     Problem: Knowledge Deficit  Goal: Patient/family/caregiver demonstrates understanding of disease process, treatment plan, medications, and discharge instructions  Description: Complete learning assessment and assess knowledge base.  Interventions:  - Provide teaching at level of understanding  - Provide teaching via preferred learning methods  Outcome: Progressing

## 2024-09-13 ENCOUNTER — ANESTHESIA (OUTPATIENT)
Dept: PERIOP | Facility: HOSPITAL | Age: 59
End: 2024-09-13
Payer: COMMERCIAL

## 2024-09-13 VITALS
WEIGHT: 121 LBS | RESPIRATION RATE: 18 BRPM | HEART RATE: 83 BPM | BODY MASS INDEX: 22.26 KG/M2 | SYSTOLIC BLOOD PRESSURE: 137 MMHG | TEMPERATURE: 98.6 F | HEIGHT: 62 IN | DIASTOLIC BLOOD PRESSURE: 73 MMHG | OXYGEN SATURATION: 98 %

## 2024-09-13 PROBLEM — E87.6 HYPOKALEMIA: Status: RESOLVED | Noted: 2024-09-12 | Resolved: 2024-09-13

## 2024-09-13 LAB
1,25(OH)2D SERPL-MCNC: 124 PG/ML (ref 5–200)
1,25(OH)2D SERPL-MCNC: 126 PG/ML (ref 5–200)
ACE SERPL-CCNC: 122 U/L (ref 14–82)
ANION GAP SERPL CALCULATED.3IONS-SCNC: 5 MMOL/L (ref 4–13)
BACTERIA UR CULT: NORMAL
BASOPHILS # BLD AUTO: 0.01 THOUSANDS/ÂΜL (ref 0–0.1)
BASOPHILS NFR BLD AUTO: 1 % (ref 0–1)
BUN SERPL-MCNC: 15 MG/DL (ref 5–25)
CA-I BLD-SCNC: 1.47 MMOL/L (ref 1.12–1.32)
CALCIUM SERPL-MCNC: 11 MG/DL (ref 8.4–10.2)
CHLORIDE SERPL-SCNC: 109 MMOL/L (ref 96–108)
CO2 SERPL-SCNC: 26 MMOL/L (ref 21–32)
CREAT SERPL-MCNC: 1.15 MG/DL (ref 0.6–1.3)
EOSINOPHIL # BLD AUTO: 0.2 THOUSAND/ÂΜL (ref 0–0.61)
EOSINOPHIL NFR BLD AUTO: 10 % (ref 0–6)
ERYTHROCYTE [DISTWIDTH] IN BLOOD BY AUTOMATED COUNT: 12.9 % (ref 11.6–15.1)
GFR SERPL CREATININE-BSD FRML MDRD: 53 ML/MIN/1.73SQ M
GLUCOSE SERPL-MCNC: 85 MG/DL (ref 65–140)
HCT VFR BLD AUTO: 26.7 % (ref 34.8–46.1)
HGB BLD-MCNC: 8.7 G/DL (ref 11.5–15.4)
IMM GRANULOCYTES # BLD AUTO: 0.01 THOUSAND/UL (ref 0–0.2)
IMM GRANULOCYTES NFR BLD AUTO: 1 % (ref 0–2)
LYMPHOCYTES # BLD AUTO: 0.19 THOUSANDS/ÂΜL (ref 0.6–4.47)
LYMPHOCYTES NFR BLD AUTO: 10 % (ref 14–44)
MCH RBC QN AUTO: 28.4 PG (ref 26.8–34.3)
MCHC RBC AUTO-ENTMCNC: 32.6 G/DL (ref 31.4–37.4)
MCV RBC AUTO: 87 FL (ref 82–98)
MONOCYTES # BLD AUTO: 0.19 THOUSAND/ÂΜL (ref 0.17–1.22)
MONOCYTES NFR BLD AUTO: 10 % (ref 4–12)
NEUTROPHILS # BLD AUTO: 1.36 THOUSANDS/ÂΜL (ref 1.85–7.62)
NEUTS SEG NFR BLD AUTO: 68 % (ref 43–75)
NRBC BLD AUTO-RTO: 0 /100 WBCS
PLATELET # BLD AUTO: 167 THOUSANDS/UL (ref 149–390)
PMV BLD AUTO: 9.7 FL (ref 8.9–12.7)
POTASSIUM SERPL-SCNC: 3.5 MMOL/L (ref 3.5–5.3)
RBC # BLD AUTO: 3.06 MILLION/UL (ref 3.81–5.12)
SODIUM SERPL-SCNC: 140 MMOL/L (ref 135–147)
T4 FREE SERPL-MCNC: 1.08 NG/DL (ref 0.61–1.12)
TSH SERPL DL<=0.05 MIU/L-ACNC: 1.06 UIU/ML (ref 0.45–4.5)
WBC # BLD AUTO: 1.96 THOUSAND/UL (ref 4.31–10.16)

## 2024-09-13 PROCEDURE — 99238 HOSP IP/OBS DSCHRG MGMT 30/<: CPT | Performed by: INTERNAL MEDICINE

## 2024-09-13 PROCEDURE — 99232 SBSQ HOSP IP/OBS MODERATE 35: CPT | Performed by: INTERNAL MEDICINE

## 2024-09-13 PROCEDURE — 85025 COMPLETE CBC W/AUTO DIFF WBC: CPT

## 2024-09-13 PROCEDURE — 84443 ASSAY THYROID STIM HORMONE: CPT | Performed by: STUDENT IN AN ORGANIZED HEALTH CARE EDUCATION/TRAINING PROGRAM

## 2024-09-13 PROCEDURE — 80048 BASIC METABOLIC PNL TOTAL CA: CPT

## 2024-09-13 PROCEDURE — 84439 ASSAY OF FREE THYROXINE: CPT | Performed by: STUDENT IN AN ORGANIZED HEALTH CARE EDUCATION/TRAINING PROGRAM

## 2024-09-13 PROCEDURE — 82330 ASSAY OF CALCIUM: CPT | Performed by: STUDENT IN AN ORGANIZED HEALTH CARE EDUCATION/TRAINING PROGRAM

## 2024-09-13 RX ORDER — PREDNISONE 10 MG/1
TABLET ORAL
Qty: 58 TABLET | Refills: 0 | Status: SHIPPED | OUTPATIENT
Start: 2024-09-14 | End: 2024-10-09

## 2024-09-13 RX ORDER — HYDROMORPHONE HCL IN WATER/PF 6 MG/30 ML
0.2 PATIENT CONTROLLED ANALGESIA SYRINGE INTRAVENOUS EVERY 4 HOURS PRN
Status: DISCONTINUED | OUTPATIENT
Start: 2024-09-13 | End: 2024-09-13 | Stop reason: HOSPADM

## 2024-09-13 RX ORDER — PREDNISONE 20 MG/1
40 TABLET ORAL DAILY
Status: DISCONTINUED | OUTPATIENT
Start: 2024-09-13 | End: 2024-09-13 | Stop reason: HOSPADM

## 2024-09-13 RX ORDER — SODIUM CHLORIDE 9 MG/ML
100 INJECTION, SOLUTION INTRAVENOUS CONTINUOUS
Status: DISCONTINUED | OUTPATIENT
Start: 2024-09-13 | End: 2024-09-13

## 2024-09-13 RX ADMIN — CYANOCOBALAMIN TAB 500 MCG 1000 MCG: 500 TAB at 09:27

## 2024-09-13 RX ADMIN — PREDNISONE 40 MG: 20 TABLET ORAL at 12:18

## 2024-09-13 RX ADMIN — FERROUS SULFATE TAB 325 MG (65 MG ELEMENTAL FE) 325 MG: 325 (65 FE) TAB at 07:44

## 2024-09-13 RX ADMIN — POTASSIUM CHLORIDE 40 MEQ: 1500 TABLET, EXTENDED RELEASE ORAL at 09:27

## 2024-09-13 RX ADMIN — OXYCODONE HYDROCHLORIDE 5 MG: 5 TABLET ORAL at 16:46

## 2024-09-13 RX ADMIN — FENOFIBRATE 145 MG: 145 TABLET ORAL at 09:27

## 2024-09-13 RX ADMIN — OXYCODONE HYDROCHLORIDE 5 MG: 5 TABLET ORAL at 00:55

## 2024-09-13 RX ADMIN — URSODIOL 900 MG: 300 CAPSULE ORAL at 09:29

## 2024-09-13 RX ADMIN — TAMSULOSIN HYDROCHLORIDE 0.4 MG: 0.4 CAPSULE ORAL at 16:46

## 2024-09-13 RX ADMIN — SODIUM CHLORIDE 1000 ML: 0.9 INJECTION, SOLUTION INTRAVENOUS at 14:06

## 2024-09-13 RX ADMIN — HYDROMORPHONE HYDROCHLORIDE 0.2 MG: 0.2 INJECTION, SOLUTION INTRAMUSCULAR; INTRAVENOUS; SUBCUTANEOUS at 09:37

## 2024-09-13 RX ADMIN — OXYBUTYNIN CHLORIDE 5 MG: 5 TABLET ORAL at 00:55

## 2024-09-13 RX ADMIN — OXYCODONE HYDROCHLORIDE 5 MG: 5 TABLET ORAL at 06:21

## 2024-09-13 RX ADMIN — OXYBUTYNIN CHLORIDE 5 MG: 5 TABLET ORAL at 07:44

## 2024-09-13 NOTE — PROGRESS NOTES
Progress Note - Nephrology   Name: Danielle Haque 58 y.o. female I MRN: [id number]  Unit/Bed#: W -01 I Date of Admission: 9/10/2024   Date of Service: 9/13/2024 I Hospital Day: 3     Assessment & Plan  Hypercalcemia  -- Recurrent issue  -- Present on admission, calcium was 13.2 peaking at 13.4  -- Ionized calcium level high at 1.67 on admission, ionized calcium level has improved but unchanged from yesterday currently at 1.47 with no change in the total serum calcium  -- PTH is suppressed, not parathyroid related  -- Follow-up serum protein electrophoresis, check PTH related peptide  --Vitamin D 1, 25 dihydroxy level is normal at 124 and vitamin D 25-hydroxy level is consistent with insufficiency at 29  -- Angiotensin-converting enzyme from last month was elevated at 117 back in August  --Initially off steroids for ileostomy healing was evaluated by endocrinology and rheumatology  --Total serum calcium level improved to 10.9  -- Prednisone restarted at 40 mg daily  -- Restart normal saline at 100 mL/h  -- Trend daily ionized calcium levels    Hypertension  --Blood pressure controlled and stable and euvolemic  --Continue amlodipine 10 mg daily  -- Restart normal saline  Rectal cancer (HCC)  -- Follows with colorectal surgery and oncology  -- Status post robotic low anterior resection low pelvic anastomosis colorectal anastomosis stapled left-sided loop colostomy reversal with a diverting loop ileostomy on August 17, 2023 negative margins.    Sarcoidosis  -- Liver biopsy proven nonnecrotizing granulomas consistent with hepatic involvement of her sarcoidosis.  -- Prednisone in the past, along with UDCA but has had recrudescence in ALP and ACE levels have been rising  -- Currently on fenofibrate  -- Being managed by GI as an outpatient and pulmonary  -- Prednisone restarted at 40 mg daily  -- Rheumatology and endocrinology on board    CKD stage G3a/A2, GFR 45-59 and albumin creatinine ratio  mg/g ([facility])  Lab  Results   Component Value Date    EGFR 53 09/13/2024    EGFR 53 09/12/2024    EGFR 50 09/11/2024    CREATININE 1.15 09/13/2024    CREATININE 1.14 09/12/2024    CREATININE 1.20 09/11/2024   -- Outpatient nephrologist Dr. Reyes  -- Baseline creatinine low 1's  -- History of bilateral hydronephrosis.  -- CT scan showed improved right-sided hydronephrosis.  Double-J right ureteral stent.  Persistent mild left-sided hydroureteronephrosis with a double-J ureteral stent  -- Renal function montrell stable and at baseline  Hypokalemia (Resolved: 9/13/2024)  -- 3.5 today  Pancytopenia (HCC)  -- ? related to sarcoidosis      I have reviewed the nephrology recommendations including assessment and plan, with patient, and we are in agreement with renal plan including the information outlined above. Nephrology service will follow.    History of Present Illness   Brief History of Admission - 58-year-old female with a history of chronic kidney disease stage IIIA, hypertension, hepatic sarcoidosis, adrenal nodule, colon cancer status post diverting ileostomy who presented as a direct admission for hypercalcemia.     No chest pain or shortness of breath    Objective      Temp:  [97.8 °F (36.6 °C)-98.6 °F (37 °C)] 98.6 °F (37 °C)  HR:  [82-86] 86  Resp:  [16] 16  BP: (108-131)/(62-69) 108/62  O2 Device: None (Room air)         Vitals:    09/10/24 1651 09/10/24 2045   Weight: 55.5 kg (122 lb 5.7 oz) 54.9 kg (121 lb)     I/O last 24 hours:  In: 730 [P.O.:720; I.V.:10]  Out: -   Lines/Drains/Airways       Active Status       Name Placement date Placement time Site Days    Port A Cath 03/21/23 Right Internal jugular 03/21/23  1022  Internal jugular  542    Ileostomy Loop RLQ 08/17/23  1140  RLQ  393    Ureteral Internal Stent Left ureter 6 Fr. 07/21/23  0946  Left ureter  420    Ureteral Internal Stent Right ureter 7 Fr. 07/21/23  1004  Right ureter  420    Ureteral Internal Stent Right ureter 7 Fr. 10/11/23  1505  Right ureter  337     Ureteral Internal Stent Left ureter 6 Fr. 05/10/24  1236  Left ureter  126    Ureteral Internal Stent Right ureter 6 Fr. 05/10/24  1241  Right ureter  126                  Physical Exam  Vitals and nursing note reviewed.   Constitutional:       General: She is not in acute distress.     Appearance: She is well-developed.   HENT:      Head: Normocephalic and atraumatic.   Eyes:      General: No scleral icterus.     Conjunctiva/sclera: Conjunctivae normal.      Pupils: Pupils are equal, round, and reactive to light.   Cardiovascular:      Rate and Rhythm: Normal rate and regular rhythm.      Heart sounds: S1 normal and S2 normal. No murmur heard.     No friction rub. No gallop.   Pulmonary:      Effort: Pulmonary effort is normal. No respiratory distress.      Breath sounds: Normal breath sounds. No wheezing or rales.   Abdominal:      General: Bowel sounds are normal.      Palpations: Abdomen is soft.      Tenderness: There is no abdominal tenderness. There is no rebound.   Musculoskeletal:         General: Normal range of motion.      Cervical back: Normal range of motion and neck supple.   Skin:     Findings: No rash.   Neurological:      Mental Status: She is alert and oriented to person, place, and time.   Psychiatric:         Behavior: Behavior normal.        Medications:    Current Facility-Administered Medications:     acetaminophen (TYLENOL) tablet 650 mg, 650 mg, Oral, Q6H PRN, Lilliam Teixeira MD    amLODIPine (NORVASC) tablet 10 mg, 10 mg, Oral, Daily, Lilliam Teixeira MD, 10 mg at 09/12/24 0845    cyanocobalamin (VITAMIN B-12) tablet 1,000 mcg, 1,000 mcg, Oral, Daily, Lilliam Teixeira MD, 1,000 mcg at 09/13/24 0927    fenofibrate (TRICOR) tablet 145 mg, 145 mg, Oral, Daily, Lilliam Teixeira MD, 145 mg at 09/13/24 0927    ferrous sulfate tablet 325 mg, 325 mg, Oral, Daily With Breakfast, Lilliam Teixeira MD, 325 mg at 09/13/24 0744    heparin (porcine) subcutaneous injection 5,000 Units, 5,000 Units, Subcutaneous, Q8H  STANLEY, 5,000 Units at 09/12/24 2210 **AND** [COMPLETED] Platelet count, , , Once, Lilliam Teixeira MD    HYDROmorphone HCl (DILAUDID) injection 0.2 mg, 0.2 mg, Intravenous, Q4H PRN, Francisca Horn MD, 0.2 mg at 09/13/24 0937    ondansetron (ZOFRAN) injection 4 mg, 4 mg, Intravenous, Q6H PRN, Lilliam Teixeira MD    oxybutynin (DITROPAN) tablet 5 mg, 5 mg, Oral, Q6H PRN, Lilliam Teixeira MD, 5 mg at 09/13/24 0744    oxyCODONE (ROXICODONE) IR tablet 5 mg, 5 mg, Oral, Q4H PRN, Lilliam Teixeira MD, 5 mg at 09/13/24 0621    potassium chloride (Klor-Con M20) CR tablet 40 mEq, 40 mEq, Oral, BID, Lilliam Teixeira MD, 40 mEq at 09/13/24 0927    predniSONE tablet 40 mg, 40 mg, Oral, Daily, Francisca Horn MD, 40 mg at 09/13/24 1218    sodium chloride 0.9 % infusion, 100 mL/hr, Intravenous, Continuous, Genaro Cooper MD    tamsulosin (FLOMAX) capsule 0.4 mg, 0.4 mg, Oral, Daily With Dinner, Lilliam Teixeira MD, 0.4 mg at 09/12/24 1653    ursodiol (ACTIGALL) capsule 900 mg, 900 mg, Oral, Daily, Lilliam Teixeira MD, 900 mg at 09/13/24 0929      Lab Results: I have reviewed the following results: CBC/BMP:   .     09/13/24  0630 09/13/24  0637 09/13/24  1128   WBC  --  1.96*  --    HGB  --  8.7*  --    HCT  --  26.7*  --    PLT  --  167  --    SODIUM 140  --   --    K 3.5  --   --    *  --   --    CO2 26  --   --    BUN 15  --   --    CREATININE 1.15  --   --    GLUC 85  --   --    CAIONIZED  --   --  1.47*    , Creatinine Clearance: Estimated Creatinine Clearance: 43.2 mL/min (by C-G formula based on SCr of 1.15 mg/dL).  Results from last 7 days   Lab Units 09/13/24  0637 09/13/24  0630 09/12/24  0716 09/12/24  0526 09/11/24  0537 09/10/24  2225 09/10/24  1722 09/10/24  1305   WBC Thousand/uL 1.96*  --   --  1.66* 1.99*  --  3.40*  --    HEMOGLOBIN g/dL 8.7*  --   --  9.3* 9.1*  --  9.3*  --    HEMATOCRIT % 26.7*  --   --  28.4* 28.2*  --  28.5*  --    PLATELETS Thousands/uL 167  --   --  148* 183 203 192  --    POTASSIUM  "mmol/L  --  3.5 3.4*  --  3.7  --  3.8 4.2   CHLORIDE mmol/L  --  109* 110*  --  110*  --  102 102   CO2 mmol/L  --  26 26  --  25  --  26 26   BUN mg/dL  --  15 15  --  19  --  25 24   CREATININE mg/dL  --  1.15 1.14  --  1.20  --  1.43* 1.34*   CALCIUM mg/dL  --  11.0* 10.9*  --  11.6*  --  13.4* 13.2*   MAGNESIUM mg/dL  --   --   --   --   --   --  1.7*  --    PHOSPHORUS mg/dL  --   --   --   --   --   --  3.9  --    ALBUMIN g/dL  --   --   --   --   --   --  3.6  --        Administrative Statements   I have spent a total time of 15 minutes in caring for this patient on the day of the visit/encounter including Instructions for management, Counseling / Coordination of care, Documenting in the medical record, Reviewing / ordering tests, medicine, procedures  , and Obtaining or reviewing history  .  Portions of the record may have been created with voice recognition software. Occasional wrong word or \"sound a like\" substitutions may have occurred due to the inherent limitations of voice recognition software. Read the chart carefully and recognize, using context, where substitutions have occurred.If you have any questions, please contact the dictating provider.  "

## 2024-09-13 NOTE — PLAN OF CARE
Problem: PAIN - ADULT  Goal: Verbalizes/displays adequate comfort level or baseline comfort level  Description: Interventions:  - Encourage patient to monitor pain and request assistance  - Assess pain using appropriate pain scale  - Administer analgesics based on type and severity of pain and evaluate response  - Implement non-pharmacological measures as appropriate and evaluate response  - Consider cultural and social influences on pain and pain management  - Notify physician/advanced practitioner if interventions unsuccessful or patient reports new pain  Outcome: Progressing     Problem: INFECTION - ADULT  Goal: Absence or prevention of progression during hospitalization  Description: INTERVENTIONS:  - Assess and monitor for signs and symptoms of infection  - Monitor lab/diagnostic results  - Monitor all insertion sites, i.e. indwelling lines, tubes, and drains  - Monitor endotracheal if appropriate and nasal secretions for changes in amount and color  - White appropriate cooling/warming therapies per order  - Administer medications as ordered  - Instruct and encourage patient and family to use good hand hygiene technique  - Identify and instruct in appropriate isolation precautions for identified infection/condition  Outcome: Progressing  Goal: Absence of fever/infection during neutropenic period  Description: INTERVENTIONS:  - Monitor WBC    Outcome: Progressing     Problem: SAFETY ADULT  Goal: Patient will remain free of falls  Description: INTERVENTIONS:  - Educate patient/family on patient safety including physical limitations  - Instruct patient to call for assistance with activity   - Consult OT/PT to assist with strengthening/mobility   - Keep Call bell within reach  - Keep bed low and locked with side rails adjusted as appropriate  - Keep care items and personal belongings within reach  - Initiate and maintain comfort rounds  - Make Fall Risk Sign visible to staff  - Offer Toileting every  Hours,  in advance of need  - Initiate/Maintain alarm  - Obtain necessary fall risk management equipment:   - Apply yellow socks and bracelet for high fall risk patients  - Consider moving patient to room near nurses station  Outcome: Progressing  Goal: Maintain or return to baseline ADL function  Description: INTERVENTIONS:  -  Assess patient's ability to carry out ADLs; assess patient's baseline for ADL function and identify physical deficits which impact ability to perform ADLs (bathing, care of mouth/teeth, toileting, grooming, dressing, etc.)  - Assess/evaluate cause of self-care deficits   - Assess range of motion  - Assess patient's mobility; develop plan if impaired  - Assess patient's need for assistive devices and provide as appropriate  - Encourage maximum independence but intervene and supervise when necessary  - Involve family in performance of ADLs  - Assess for home care needs following discharge   - Consider OT consult to assist with ADL evaluation and planning for discharge  - Provide patient education as appropriate  Outcome: Progressing  Goal: Maintains/Returns to pre admission functional level  Description: INTERVENTIONS:  - Perform AM-PAC 6 Click Basic Mobility/ Daily Activity assessment daily.  - Set and communicate daily mobility goal to care team and patient/family/caregiver.   - Collaborate with rehabilitation services on mobility goals if consulted  - Perform Range of Motion  times a day.  - Reposition patient every  hours.  - Dangle patient  times a day  - Stand patient  times a day  - Ambulate patient  times a day  - Out of bed to chair  times a day   - Out of bed for meals  times a day  - Out of bed for toileting  - Record patient progress and toleration of activity level   Outcome: Progressing     Problem: DISCHARGE PLANNING  Goal: Discharge to home or other facility with appropriate resources  Description: INTERVENTIONS:  - Identify barriers to discharge w/patient and caregiver  - Arrange for  needed discharge resources and transportation as appropriate  - Identify discharge learning needs (meds, wound care, etc.)  - Arrange for interpretive services to assist at discharge as needed  - Refer to Case Management Department for coordinating discharge planning if the patient needs post-hospital services based on physician/advanced practitioner order or complex needs related to functional status, cognitive ability, or social support system  Outcome: Progressing     Problem: Knowledge Deficit  Goal: Patient/family/caregiver demonstrates understanding of disease process, treatment plan, medications, and discharge instructions  Description: Complete learning assessment and assess knowledge base.  Interventions:  - Provide teaching at level of understanding  - Provide teaching via preferred learning methods  Outcome: Progressing

## 2024-09-13 NOTE — ASSESSMENT & PLAN NOTE
-- Liver biopsy proven nonnecrotizing granulomas consistent with hepatic involvement of her sarcoidosis.  -- Prednisone in the past, along with UDCA but has had recrudescence in ALP and ACE levels have been rising  -- Currently on fenofibrate  -- Being managed by GI as an outpatient and pulmonary  -- Prednisone restarted at 40 mg daily  -- Rheumatology and endocrinology on board

## 2024-09-13 NOTE — ASSESSMENT & PLAN NOTE
-- Recurrent issue  -- Present on admission, calcium was 13.2 peaking at 13.4  -- Ionized calcium level high at 1.67 on admission, ionized calcium level has improved but unchanged from yesterday currently at 1.47 with no change in the total serum calcium  -- PTH is suppressed, not parathyroid related  -- Follow-up serum protein electrophoresis, check PTH related peptide  --Vitamin D 1, 25 dihydroxy level is normal at 124 and vitamin D 25-hydroxy level is consistent with insufficiency at 29  -- Angiotensin-converting enzyme from last month was elevated at 117 back in August  --Initially off steroids for ileostomy healing was evaluated by endocrinology and rheumatology  --Total serum calcium level improved to 10.9  -- Prednisone restarted at 40 mg daily  -- Restart normal saline at 100 mL/h  -- Trend daily ionized calcium levels

## 2024-09-13 NOTE — ASSESSMENT & PLAN NOTE
--Blood pressure controlled and stable and euvolemic  --Continue amlodipine 10 mg daily  -- Restart normal saline

## 2024-09-13 NOTE — ASSESSMENT & PLAN NOTE
Patient has history of rectal cancer status post diverting loop ileostomy on 8/17/2023 currently in remission.  Complicated by ureteral strictures.  Patient was tentatively due for ileostomy reversal on 9/26/2024.  Prior to that she will have flexible sigmoidoscopy to check anastomosis. Ongoing issues with hypercalcemia was discussed with surgical team and surgery will postponed at this time till pt is stabilized

## 2024-09-13 NOTE — ASSESSMENT & PLAN NOTE
Started on prednisone 40 mg taper per rheumatology  Follow-up with rheumatology, pulmonology and gastroenterology

## 2024-09-13 NOTE — CASE MANAGEMENT
Case Management Assessment    Patient name Danielle Haque  Location W /W -01 MRN [id number]  : 1965 Date 2024       Current Admission Date: 9/10/2024  Current Admission Diagnosis:Hypercalcemia   Patient Active Problem List    Diagnosis Date Noted Date Diagnosed    Hypercalcemia 09/10/2024     CKD stage G3a/A2, GFR 45-59 and albumin creatinine ratio  mg/g (HCC) 2024     Metabolic alkalosis 2024     Chronic kidney disease-mineral bone disorder (CKD-MBD) with stage 3a chronic kidney disease (HCC) 2024     Nausea 2024     Hypomagnesemia 2024     THOR (acute kidney injury) ([facility]) 2024     Lump of skin 2024     Iron deficiency anemia 2024     Pulmonary nodules 12/15/2023     Ureteral calculi with hydroureteronephrosis 2023     Ureteral stricture 2023     Neuralgia involving scalp 2023     Anxiety about health 2023     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 2023     Scalp pain 2023     Left ear pain 2023     Chronic anticoagulation 2023     Partial small bowel obstruction (HCC) 2023     Shingles 2023     Preop examination 2023     Rectal cancer (HCC) 2023     Annual physical exam 2023     Drug-induced constipation 2023     Palliative care patient 2023     Cancer related pain 2023     Port-A-Cath in place 2023     Dysuria 2023     Chest pain 2023     Kidney calculi      Pancytopenia (HCC) 02/15/2023     Nonrheumatic mitral valve regurgitation 02/15/2023     Advance directive discussed with patient 02/15/2023     Skin lesion 02/15/2023     Adrenal nodule (HCC) 2023     Malignant neoplasm of sigmoid colon (HCC) 2022     History of Clostridium difficile colitis 2022     Other hemochromatosis 2022     Hypertension 2022     Sarcoidosis 2022     Impaired fasting glucose 2022      Transaminitis 12/25/2022     Hydronephrosis due to ureteral stricture 12/25/2022       LOS (days): 3  Geometric Mean LOS (GMLOS) (days): 2.6  Days to GMLOS:-0.2     OBJECTIVE:    Risk of Unplanned Readmission Score: 19.14         Current admission status: Inpatient       Preferred Pharmacy:   [facility]/pharmacy #[id number] - [place] - [address]  [address] [facility] [place] [address]  Phone: [phone] Fax: [phone]    [facility] - [place] - [address]  [address]  [place] [address]  Phone: [phone] Fax: [phone]    [facility] [place] - [place] - [address]  [address]  [place] [address]  Phone: [phone] Fax: [phone]    [facility]/pharmacy #[id number] - [place] - [address]  [address] [place] [address]  Phone: [phone] Fax: [phone]    Primary Care Provider: Leena Anaya MD    Primary Insurance: [employer]  Secondary Insurance:     ASSESSMENT:  Active Health Care Proxies       [facility] Representative - Relative   Primary Phone: [phone] (Mobile)                           Readmission Root Cause  30 Day Readmission: No    Patient Information  Admitted from:: Home  Mental Status: Alert  During Assessment patient was accompanied by: Not accompanied during assessment  Assessment information provided by:: Patient  Support Systems: Spouse/significant other, Family members  Home entry access options. Select all that apply.: Stairs  Number of steps to enter home.: 3  Do the steps have railings?: Yes  Type of Current Residence: [address] home  Upon entering residence, is there a bedroom on the main floor (no further steps)?: Yes  Upon entering residence, is there a bathroom on the main floor (no further steps)?: Yes  Living Arrangements: Lives w/ Spouse/significant other, Lives w/ Daughter    Activities of Daily Living Prior to Admission  Functional Status:  Independent  Completes ADLs independently?: Yes  Ambulates independently?: Yes  Does patient use assisted devices?: No  Does patient currently own DME?: No  Does patient have a history of Outpatient Therapy (PT/OT)?: No  Does the patient have a history of Short-Term Rehab?: No  Does patient have a history of HHC?: Yes  Does patient currently have HHC?: No         Patient Information Continued  Income Source: Government aid  Does patient have prescription coverage?: Yes  Does patient receive dialysis treatments?: No  Does patient have a history of substance abuse?: No  Does patient have a history of Mental Health Diagnosis?: No         Means of Transportation  Means of Transport to Appts:: Family transport      Social Determinants of Health (SDOH)      Flowsheet Row Most Recent Value   Housing Stability    In the last 12 months, was there a time when you were not able to pay the mortgage or rent on time? N   At any time in the past 12 months, were you homeless or living in a shelter (including now)? N   Transportation Needs    In the past 12 months, has lack of transportation kept you from medical appointments or from getting medications? no   In the past 12 months, has lack of transportation kept you from meetings, work, or from getting things needed for daily living? No   Food Insecurity    Within the past 12 months, you worried that your food would run out before you got the money to buy more. Never true   Within the past 12 months, the food you bought just didn't last and you didn't have money to get more. Never true   Utilities    In the past 12 months has the electric, gas, oil, or water company threatened to shut off services in your home? No

## 2024-09-13 NOTE — ASSESSMENT & PLAN NOTE
- anemia with HGB ranging around 8, WBC  trended from 3.4 to 1.9, mild down trend in Plt to 148 improved to 167  IVF hydration is contributing however ongoing pancytopenia is most likely due to sarcoidosis flare  -C/w prednisone taper. Follow up with rheumatology  - Follow up with PCP

## 2024-09-13 NOTE — PROGRESS NOTES
Progress Note - Endocrinology   Name: Danielle Haque 58 y.o. female I MRN: [id number]  Unit/Bed#: W -01 I Date of Admission: 9/10/2024   Date of Service: 9/13/2024 I Hospital Day: 3     Assessment & Plan  Hypercalcemia  Chronic hypercalcemia previously in the 10s  Has recurrent kidney stones/complex urologic history requiring multiple stent placement.  Stone analysis revealing calcium oxalate and phosphate.  No excess oral ca or however otc vit d 1000, no diurectics  20 pound unexplained weight loss.  Lab work on admission   On admission serum calcium 13.9  Suppressed PTH 4.6- low suspicion for PTH disease  Elevated ionized ca,   Low 25 OH vit d low,   She has anemia( pancytopenia), UA with 1+ protein , THOR creat 1.4  Nephrology currently on board evaluating sarcoid etiology and ruling out MM  Patient was given normal saline 100 cc/h, calcium improved currently 10.9.  9/13- labs   1, 25 hydroxy vitamin D normal, ACE pending  Ca today 11,, ionized calcium 1.46   Started prednisone taper per rheumatology.    Plan  Monitor calcium levels  Follow-up ACE  Can consider Bisphosphonate if refractory     Hypertension  On amlodipine 10, Stable continue treatment per primary team  Sarcoidosis  Started on prednisone 40 mg taper per rheumatology  Follow-up with rheumatology, pulmonology and gastroenterology  Pancytopenia (HCC)  Unclear at this point possibly related to sarcoidosis  Consider follow-up with hematology oncology     Adrenal nodule  1cm right adrenal - stable on imaging CT abdomen pelvis March 2024,   Not previous testing for adrenal function   Can test for adrenal function OPT  Rectal cancer (HCC)  In remission  Follow-up hematology oncology  CKD stage G3a/A2, GFR 45-59 and albumin creatinine ratio  mg/g (HCC)  Lab Results   Component Value Date    EGFR 53 09/13/2024    EGFR 53 09/12/2024    EGFR 50 09/11/2024    CREATININE 1.15 09/13/2024    CREATININE 1.14 09/12/2024    CREATININE 1.20 09/11/2024        History of Present Illness   24 Hour Events : No overnight, patient states she is tolerating her diet, less nauseous.  She feels more energy.  Subjective :     Objective      Temp:  [97.8 °F (36.6 °C)-98.6 °F (37 °C)] 98.6 °F (37 °C)  HR:  [82-86] 86  Resp:  [16] 16  BP: (108-131)/(62-69) 108/62  O2 Device: None (Room air)          I/O last 24 hours:  In: 730 [P.O.:720; I.V.:10]  Out: -   Lines/Drains/Airways       Active Status       Name Placement date Placement time Site Days    Port A Cath 03/21/23 Right Internal jugular 03/21/23  1022  Internal jugular  542    Ileostomy Loop RLQ 08/17/23  1140  RLQ  393    Ureteral Internal Stent Left ureter 6 Fr. 07/21/23  0946  Left ureter  420    Ureteral Internal Stent Right ureter 7 Fr. 07/21/23  1004  Right ureter  420    Ureteral Internal Stent Right ureter 7 Fr. 10/11/23  1505  Right ureter  338    Ureteral Internal Stent Left ureter 6 Fr. 05/10/24  1236  Left ureter  126    Ureteral Internal Stent Right ureter 6 Fr. 05/10/24  1241  Right ureter  126                  Physical Exam  Vitals and nursing note reviewed.   Constitutional:       General: She is not in acute distress.     Appearance: She is well-developed.   HENT:      Head: Normocephalic and atraumatic.   Eyes:      Conjunctiva/sclera: Conjunctivae normal.   Cardiovascular:      Rate and Rhythm: Normal rate and regular rhythm.      Heart sounds: No murmur heard.  Pulmonary:      Effort: Pulmonary effort is normal. No respiratory distress.      Breath sounds: Normal breath sounds.   Abdominal:      Palpations: Abdomen is soft.      Tenderness: There is no abdominal tenderness.      Comments: Colostomy Bag in place   Musculoskeletal:         General: No swelling.      Cervical back: Neck supple.   Skin:     General: Skin is warm and dry.      Capillary Refill: Capillary refill takes less than 2 seconds.   Neurological:      Mental Status: She is alert.   Psychiatric:         Mood and Affect: Mood normal.           Lab Results: I have reviewed the following results:   Imaging Review: none  Other Studies: BMP, VIT D, ionized ca,    VTE Pharmacologic Prophylaxis: Enoxaparin (Lovenox)  VTE Mechanical Prophylaxis: sequential compression device

## 2024-09-13 NOTE — ASSESSMENT & PLAN NOTE
-History of biopsy proven sarcoidosis in the lung and liver.    -Follows with Dr. Machuca (GI) in the outpatient setting.    -Currently on ursodiol 900 mg daily.  -Was on chronic prednisone which was tapered 2 weeks ago for her to undergo ileostomy reversal   surgery.     Plan:  -Continue ursodiol 90 mg daily.  - Per rheumatology, restart steroid taper: 40mg for 5 days, and decrease by 10 mg per week  - Follow up with Rheumatology  - Follow up with Ophthalmology   - Follow up with Pulmonology  - Follow up with GI

## 2024-09-13 NOTE — DISCHARGE SUMMARY
Discharge Summary - Hospitalist   Name: Danielle Haque 58 y.o. female I MRN: [id number]  Unit/Bed#: W -01 I Date of Admission: 9/10/2024   Date of Service: 9/13/2024 I Hospital Day: 3     Assessment & Plan  Hypercalcemia  -Elevated calcium to 13.2 on outpatient labs.Sent to [facility]   - POA Ca 13.4, ionized Ca+ 1.67, PTHi-low 4.7, ACE -122, Vit D 25-low 29.0  -Endorses frequent urination and thirst along with fatigue and loss of appetite. Hx of non-obstructing renal calculi.  - Patient with history of biopsy-proven sarcoidosis which was  previously managed with prednisone.  - Ddx: Hypercalcemia in the setting of sarcoidosis flareup.  - S/p IVF hydration, Ca improved to 11  - Endocrinology consult appreciated  - Rheumatology consult appreciated  - Nephrology consult appreciated    After multidisciplinary discussion of treatment options the patient decided to proceed with prednisone taper and to postpone surgery till calcium is well controlled    Plan:  Follow up with PCP  Follow up with rheumatology  Follow up on your pended results: serum and urine protein electrophoresis, Immunofixation, PTH-related peptide  Rectal cancer (HCC)  Patient has history of rectal cancer status post diverting loop ileostomy on 8/17/2023 currently in remission.  Complicated by ureteral strictures.  Patient was tentatively due for ileostomy reversal on 9/26/2024.  Prior to that she will have flexible sigmoidoscopy to check anastomosis. Ongoing issues with hypercalcemia was discussed with surgical team and surgery will postponed at this time till pt is stabilized  Hypertension  -Continue amlodipine 10 mg.  Sarcoidosis  -History of biopsy proven sarcoidosis in the lung and liver.    -Follows with Dr. Machuca (GI) in the outpatient setting.    -Currently on ursodiol 900 mg daily.  -Was on chronic prednisone which was tapered 2 weeks ago for her to undergo ileostomy reversal   surgery.     Plan:  -Continue ursodiol 90 mg daily.  - Per  rheumatology, restart steroid taper: 40mg for 5 days, and decrease by 10 mg per week  - Follow up with Rheumatology  - Follow up with Ophthalmology   - Follow up with Pulmonology  - Follow up with GI  Pancytopenia (HCC)  - anemia with HGB ranging around 8, WBC  trended from 3.4 to 1.9, mild down trend in Plt to 148 improved to 167  IVF hydration is contributing however ongoing pancytopenia is most likely due to sarcoidosis flare  -C/w prednisone taper. Follow up with rheumatology  - Follow up with PCP  CKD stage G3a/A2, GFR 45-59 and albumin creatinine ratio  mg/g (HCC)  Lab Results   Component Value Date    EGFR 53 09/13/2024    EGFR 53 09/12/2024    EGFR 50 09/11/2024    CREATININE 1.15 09/13/2024    CREATININE 1.14 09/12/2024    CREATININE 1.20 09/11/2024     Hypokalemia (Resolved: 9/13/2024)       Medical Problems       Resolved Problems  Date Reviewed: 8/29/2024            Resolved    Elevated serum creatinine 9/11/2024     Resolved by  Genaro Cooper MD    Hypokalemia 9/13/2024     Resolved by  Genaro Cooper MD        Discharging Physician / Practitioner:   PCP: Leena Anaya MD  Admission Date:   Admission Orders (From admission, onward)       Ordered        09/10/24 1841  INPATIENT ADMISSION  Once                          Discharge Date: 09/13/24    Consultations During Hospital Stay:  IP CONSULT TO NEPHROLOGY  IP CONSULT TO ENDOCRINOLOGY  IP CONSULT TO RHEUMATOLOGY     Procedures Performed: n/a      Significant Findings / Test Results:   Calcium 13.2, Ionized Ca 1.67, WBC 1.66, Hb 8.3, Plt 148    Incidental Findings:   Adrenal nodule - 1 cm stable on CT abdomen 3/2024. Workup for functionality can be mello as an outpatient     Test Results Pending at Discharge (will require follow up):   serum and urine protein electrophoresis, PTHrP     Outpatient Tests Requested:  Ionized calcium    Complications:  n/a    Reason for Admission: Management of hypercalcemia    Hospital Course:   Danielle Haque is a 58 y.o. female  patient with past medical history of hepatic and pulmonary sarcoidosis, stage IIIa CKD and colon cancer s/p diverting ileostomy in 2023 in remission, who presented on 9/10/2024 clarisa to ab abnormal blood work: calcium of 13.2.On admission, she vitals were stable, but labs showed low WBC, low hemoglobin, increased total calcium 13.2 and ionized calcium 1.67, mildly elevated creatinine, and she was admitted for management of hypercalcemia. Given ACE-elevated to 122, PTHi low, Vit D 25 low. Urinalysis was negative for UTI. Her calcium level trended down with intravenous fluids.Nephrology consult was appreciated, in settings of non-PTH related hypercalcemia IVF hydration was recommended.Rheumatology was consulted and they recommended steroid taper with prednisone 40 mg daily x 5 days followed by decrease by 10 mg every week. Endocrinology was consulted and they think the high calcium, and low complete blood count is from sarcoidosis and recommended to consider bisphosphonate. Ongoing issue was discussed with surgical team and patient, management options were discussed with the patient and she eventually agreed to postpone ileostomy reversal surgery in favor of steroids taper and management of ongoing sarcoidosis flare.    Pt is medically optimized and ready to be discharged with steroids taper and close PCP and rheumatology follow up with labs monitoring. Message was sent to PCP and rheumatology team.    Pt was encouraged to follow up with GI, pulmonology for further monitoring of sarcoidosis.    Additionally, pt was advised to see endocrinology to follow on her TSH and adrenal nodule findings on CT scan for functionality work up.    Pt expressed understanding of management plan and expressed wish to comply.      Condition at Discharge: good    Discharge Day Visit / Exam:   Subjective:  No complains today. Feeling better.    Vitals: Blood Pressure: 137/73 (09/13/24 1534)  Pulse: 83 (09/13/24 1534)  Temperature: 98.6 °F  "(37 °C) (09/12/24 2218)  Temp Source: Oral (09/11/24 0719)  Respirations: 18 (09/13/24 1534)  Height: 5' 2\" (157.5 cm) (09/10/24 2045)  Weight - Scale: 54.9 kg (121 lb) (09/10/24 2045)  SpO2: 98 % (09/13/24 1534)  Exam:   Physical Exam  Constitutional:       Appearance: Normal appearance.   HENT:      Head: Normocephalic and atraumatic.      Mouth/Throat:      Mouth: Mucous membranes are moist.   Cardiovascular:      Rate and Rhythm: Normal rate and regular rhythm.      Pulses: Normal pulses.      Heart sounds: Normal heart sounds.   Pulmonary:      Effort: Pulmonary effort is normal.      Breath sounds: Normal breath sounds.   Abdominal:      General: Abdomen is flat. Bowel sounds are normal.      Palpations: Abdomen is soft.   Skin:     General: Skin is warm.      Capillary Refill: Capillary refill takes less than 2 seconds.   Neurological:      Mental Status: She is alert and oriented to person, place, and time. Mental status is at baseline.   Psychiatric:         Mood and Affect: Mood normal.          Discussion with Family: Ex-     Discharge instructions/Information to patient and family:   See after visit summary for information provided to patient and family.      Provisions for Follow-Up Care:  See after visit summary for information related to follow-up care and any pertinent home health orders.      Mobility at time of Discharge:   Basic Mobility Inpatient Raw Score: 24  JH-HLM Goal: 8: Walk 250 feet or more  JH-HLM Achieved: 6: Walk 10 steps or more  HLM Goal achieved. Continue to encourage appropriate mobility.     Disposition:   Home    Planned Readmission:     Discharge Medications:  See after visit summary for reconciled discharge medications provided to patient and/or family.      Administrative Statements   Discharge Statement:  I have spent a total time of 45 minutes in caring for this patient on the day of the visit/encounter. >30 minutes of time was spent on: Diagnostic results, Prognosis, " Risks and benefits of tx options, Instructions for management, Patient and family education, Importance of tx compliance, Risk factor reductions, Impressions, Counseling / Coordination of care, Documenting in the medical record, Reviewing / ordering tests, medicine, procedures  , and Communicating with other healthcare professionals .    **Please Note: This note may have been constructed using a voice recognition system**    This note was prepared by Roel Silva , 4th year medical student and editedupdated by me

## 2024-09-13 NOTE — ASSESSMENT & PLAN NOTE
Chronic hypercalcemia previously in the 10s  Has recurrent kidney stones/complex urologic history requiring multiple stent placement.  Stone analysis revealing calcium oxalate and phosphate.  No excess oral ca or however otc vit d 1000, no diurectics  20 pound unexplained weight loss.  Lab work on admission   On admission serum calcium 13.9  Suppressed PTH 4.6- low suspicion for PTH disease  Elevated ionized ca,   Low 25 OH vit d low,   She has anemia( pancytopenia), UA with 1+ protein , THOR creat 1.4  Nephrology currently on board evaluating sarcoid etiology and ruling out MM  Patient was given normal saline 100 cc/h, calcium improved currently 10.9.  9/13- labs   1, 25 hydroxy vitamin D normal, ACE pending  Ca today 11,, ionized calcium 1.46   Started prednisone taper per rheumatology.    Plan  Monitor calcium levels  Follow-up ACE  Can consider Bisphosphonate if refractory

## 2024-09-13 NOTE — INCIDENTAL FINDINGS
The following findings require follow up:  Radiographic finding   Finding: Adrenal nodule - 1 cm stable on CT abdomen 3/2024. Workup for functionality can be mello as an outpatient    Follow up required: yes   Follow up should be done within 1 month(s)    Please notify the following clinician to assist with the follow up:   Dr. Kavitha Maciel    Incidental finding results were discussed with the Patient by Francisca Horn MD on 09/13/24.   They expressed understanding and all questions answered.

## 2024-09-13 NOTE — ASSESSMENT & PLAN NOTE
-Elevated calcium to 13.2 on outpatient labs.Sent to [facility]   - POA Ca 13.4, ionized Ca+ 1.67, PTHi-low 4.7, ACE -122, Vit D 25-low 29.0  -Endorses frequent urination and thirst along with fatigue and loss of appetite. Hx of non-obstructing renal calculi.  - Patient with history of biopsy-proven sarcoidosis which was  previously managed with prednisone.  - Ddx: Hypercalcemia in the setting of sarcoidosis flareup.  - S/p IVF hydration, Ca improved to 11  - Endocrinology consult appreciated  - Rheumatology consult appreciated  - Nephrology consult appreciated    After multidisciplinary discussion of treatment options the patient decided to proceed with prednisone taper and to postpone surgery till calcium is well controlled    Plan:  Follow up with PCP  Follow up with rheumatology  Follow up on your pended results: serum and urine protein electrophoresis, Immunofixation, PTH-related peptide

## 2024-09-13 NOTE — ASSESSMENT & PLAN NOTE
Lab Results   Component Value Date    EGFR 53 09/13/2024    EGFR 53 09/12/2024    EGFR 50 09/11/2024    CREATININE 1.15 09/13/2024    CREATININE 1.14 09/12/2024    CREATININE 1.20 09/11/2024

## 2024-09-13 NOTE — ASSESSMENT & PLAN NOTE
Unclear at this point possibly related to sarcoidosis  Consider follow-up with hematology oncology     Adrenal nodule  1cm right adrenal - stable on imaging CT abdomen pelvis March 2024,   Not previous testing for adrenal function   Can test for adrenal function OPT

## 2024-09-13 NOTE — ASSESSMENT & PLAN NOTE
Lab Results   Component Value Date    EGFR 53 09/13/2024    EGFR 53 09/12/2024    EGFR 50 09/11/2024    CREATININE 1.15 09/13/2024    CREATININE 1.14 09/12/2024    CREATININE 1.20 09/11/2024   -- Outpatient nephrologist Dr. Reyes  -- Baseline creatinine low 1's  -- History of bilateral hydronephrosis.  -- CT scan showed improved right-sided hydronephrosis.  Double-J right ureteral stent.  Persistent mild left-sided hydroureteronephrosis with a double-J ureteral stent  -- Renal function montrell stable and at baseline

## 2024-09-13 NOTE — DISCHARGE INSTR - AVS FIRST PAGE
Dear Danielle Haque,     It was our pleasure to care for you here at [facility].  It is our hope that we were always able to exceed the expected standards for your care during your stay.  You were hospitalized due to confusion and high calcium level.  You were cared for on the West 4th floor by Francisca Horn MD under the service of Malcolm Copeland MD with the [facility] who covers for your primary care physician (PCP), Leena Anaya MD, while you were hospitalized.  If you have any questions or concerns related to this hospitalization, you may contact us at .  For follow up as well as any medication refills, we recommend that you follow up with your primary care physician.  A registered nurse will reach out to you by phone within a few days after your discharge to answer any additional questions that you may have after going home.  However, at this time we provide for you here, the most important instructions / recommendations at discharge:     Notable Medication Adjustments -   Start taking Prednisone accordingly to taper:  Take 4 tablets (40 mg total) by mouth daily for 4 days starting tomorrow (9/14/24) through 9/17/24  On 9/18/24 Take 3 tablets (30 mg total) daily for 7 days through 9/24/24  On 9/25/24 Take 2 tablets (20 mg total) daily for 7 days through 10/1/24  On 10/2/24 1 tablet (10 mg total) daily for 7 days thorough 10/8/24  Please consult with rheumatology regarding if need to continue prednisone further  Testing Required after Discharge -   Ionized Calcium  Follow with PCP and rheumatology on the test results which were done in the hospital however were still in process at time of discharge: Immunofixation, PTH-related peptide  ** Please contact your PCP to request testing orders for any of the testing recommended here **  Important follow up information -   Follow up with PCP during the next week and request to obtain  Ionized Calcium level  Follow up with Rheumatology  Follow up with Ophthalmology   Follow up with Pulmonology  Follow up with GI   Follow up with endocrinology at your earlier convenience for findings on CT scan: 1 cm Adrenal nodule for functionality work up  Other Instructions -   Stay compliant with your daily medications and doctors follow up  If You experiencing worsening of your symptoms or new symptoms including confusion, chest pain, difficulty breathing, bleeding, fever, constipation or problem with urination please consider ER visit.  Please review this entire after visit summary as additional general instructions including medication list, appointments, activity, diet, any pertinent wound care, and other additional recommendations from your care team that may be provided for you.      Sincerely,     Francisca Horn MD

## 2024-09-14 LAB
ALBUMIN SERPL ELPH-MCNC: 2.84 G/DL (ref 3.2–5.1)
ALBUMIN SERPL ELPH-MCNC: 56.8 % (ref 48–70)
ALPHA1 GLOB SERPL ELPH-MCNC: 0.29 G/DL (ref 0.15–0.47)
ALPHA1 GLOB SERPL ELPH-MCNC: 5.8 % (ref 1.8–7)
ALPHA2 GLOB SERPL ELPH-MCNC: 0.47 G/DL (ref 0.42–1.04)
ALPHA2 GLOB SERPL ELPH-MCNC: 9.4 % (ref 5.9–14.9)
BETA GLOB ABNORMAL SERPL ELPH-MCNC: 0.38 G/DL (ref 0.31–0.57)
BETA1 GLOB SERPL ELPH-MCNC: 7.5 % (ref 4.7–7.7)
BETA2 GLOB SERPL ELPH-MCNC: 7.2 % (ref 3.1–7.9)
BETA2+GAMMA GLOB SERPL ELPH-MCNC: 0.36 G/DL (ref 0.2–0.58)
GAMMA GLOB ABNORMAL SERPL ELPH-MCNC: 0.67 G/DL (ref 0.4–1.66)
GAMMA GLOB SERPL ELPH-MCNC: 13.3 % (ref 6.9–22.3)
IGG/ALB SER: 1.31 {RATIO} (ref 1.1–1.8)
INTERPRETATION UR IFE-IMP: NORMAL
PROT PATTERN SERPL ELPH-IMP: ABNORMAL
PROT SERPL-MCNC: 5 G/DL (ref 6.4–8.2)

## 2024-09-14 PROCEDURE — 86334 IMMUNOFIX E-PHORESIS SERUM: CPT | Performed by: STUDENT IN AN ORGANIZED HEALTH CARE EDUCATION/TRAINING PROGRAM

## 2024-09-14 PROCEDURE — 84165 PROTEIN E-PHORESIS SERUM: CPT | Performed by: STUDENT IN AN ORGANIZED HEALTH CARE EDUCATION/TRAINING PROGRAM

## 2024-09-16 ENCOUNTER — TRANSITIONAL CARE MANAGEMENT (OUTPATIENT)
Dept: INTERNAL MEDICINE CLINIC | Facility: CLINIC | Age: 59
End: 2024-09-16

## 2024-09-16 NOTE — UTILIZATION REVIEW
NOTIFICATION OF ADMISSION DISCHARGE   This is a Notification of Discharge from [facility]. Please be advised that this patient has been discharge from our facility. Below you will find the admission and discharge date and time including the patient’s disposition.   UTILIZATION REVIEW CONTACT:  Octavia Tan  Utilization   Network Utilization Review Department  Phone: [phone] x carefully listen to the prompts. All voicemails are confidential.  Email: [email or web address]     ADMISSION INFORMATION  PRESENTATION DATE: 9/10/2024  4:47 PM  OBERVATION ADMISSION DATE: N/A  INPATIENT ADMISSION DATE: 9/10/24  6:41 PM   DISCHARGE DATE: 9/13/2024  6:25 PM   DISPOSITION:Home/Self Care    Network Utilization Review Department  ATTENTION: Please call with any questions or concerns to [phone] and carefully listen to the prompts so that you are directed to the right person. All voicemails are confidential.   For Discharge needs, contact Care Management DC Support Team at [phone] opt. 2  Send all requests for admission clinical reviews, approved or denied determinations and any other requests to dedicated fax number below belonging to the campus where the patient is receiving treatment. List of dedicated fax numbers for the Facilities:  FACILITY NAME UR FAX NUMBER   ADMISSION DENIALS (Administrative/Medical Necessity) [phone]   DISCHARGE SUPPORT TEAM ([facility]) [phone]   PARENT CHILD HEALTH (Maternity/NICU/Pediatrics) [phone]   [facility] [phone]   [facility] [phone]   [facility] [phone]   [facility] [phone]   [facility] [phone]   [facility] [phone]   [facility] [phone]   [facility] [phone]    [facility] [phone]   [facility] [phone]   [facility] [phone]   [facility] [phone]

## 2024-09-18 ENCOUNTER — OFFICE VISIT (OUTPATIENT)
Dept: INTERNAL MEDICINE CLINIC | Facility: CLINIC | Age: 59
End: 2024-09-18
Payer: COMMERCIAL

## 2024-09-18 ENCOUNTER — HOSPITAL ENCOUNTER (OUTPATIENT)
Dept: INFUSION CENTER | Facility: CLINIC | Age: 59
Discharge: HOME | End: 2024-09-18
Payer: COMMERCIAL

## 2024-09-18 VITALS
HEIGHT: 62 IN | DIASTOLIC BLOOD PRESSURE: 68 MMHG | OXYGEN SATURATION: 98 % | SYSTOLIC BLOOD PRESSURE: 120 MMHG | TEMPERATURE: 98.5 F | HEART RATE: 71 BPM | WEIGHT: 119.2 LBS | BODY MASS INDEX: 21.94 KG/M2

## 2024-09-18 VITALS — OXYGEN SATURATION: 99 % | TEMPERATURE: 97.6 F | HEART RATE: 71 BPM

## 2024-09-18 DIAGNOSIS — E83.52 HYPERCALCEMIA: ICD-10-CM

## 2024-09-18 DIAGNOSIS — H61.21 IMPACTED CERUMEN OF RIGHT EAR: ICD-10-CM

## 2024-09-18 DIAGNOSIS — D50.9 IRON DEFICIENCY ANEMIA, UNSPECIFIED IRON DEFICIENCY ANEMIA TYPE: Primary | ICD-10-CM

## 2024-09-18 DIAGNOSIS — D86.9 SARCOIDOSIS: ICD-10-CM

## 2024-09-18 DIAGNOSIS — Q89.1 ADRENAL GLAND ANOMALY: Primary | ICD-10-CM

## 2024-09-18 DIAGNOSIS — C20 RECTAL CANCER (HCC): ICD-10-CM

## 2024-09-18 PROCEDURE — 96365 THER/PROPH/DIAG IV INF INIT: CPT

## 2024-09-18 PROCEDURE — 99496 TRANSJ CARE MGMT HIGH F2F 7D: CPT | Performed by: GENERAL ACUTE CARE HOSPITAL

## 2024-09-18 RX ORDER — SODIUM CHLORIDE 9 MG/ML
20 INJECTION, SOLUTION INTRAVENOUS ONCE
Status: COMPLETED | OUTPATIENT
Start: 2024-09-18 | End: 2024-09-18

## 2024-09-18 RX ORDER — SODIUM CHLORIDE 9 MG/ML
20 INJECTION, SOLUTION INTRAVENOUS ONCE
Status: CANCELLED | OUTPATIENT
Start: 2024-09-18

## 2024-09-18 RX ADMIN — SODIUM CHLORIDE 20 ML/HR: 9 INJECTION, SOLUTION INTRAVENOUS at 10:39

## 2024-09-18 RX ADMIN — FERUMOXYTOL 1020 MG: 510 INJECTION INTRAVENOUS at 10:42

## 2024-09-18 NOTE — ASSESSMENT & PLAN NOTE
Recent flare up causing hypercalcemia  On prednisone taper  Need to schedule with rheum, pulm and GI for f/u  Also need to schedule with ophthal to r/o uveitis.     Orders:    Calcium, ionized; Future    CBC and differential; Future    Comprehensive metabolic panel; Future    Angiotensin converting enzyme; Future    Magnesium; Future

## 2024-09-18 NOTE — PROGRESS NOTES
Ambulatory Visit  Name: Danielle Haque      : 1965      MRN: [id number]  Encounter Provider: Leena Anaya MD  Encounter Date: 2024   Encounter department: [facility]  TCM Call       Date and time call was made  2024 10:38 AM    Hospital care reviewed  Records not available    Patient was hospitialized at  [facility]    Date of Admission  09/10/24    Date of discharge  24    Diagnosis  Hypercalcemia    Disposition  Home    Were the patients medications reviewed and updated  No    Current Symptoms  None    Weakness severity  Severe          TCM Call       Post hospital issues  None    Should patient be enrolled in anticoag monitoring?  No    Scheduled for follow up?  Yes    Did you obtain your prescribed medications  Yes    Do you need help managing your prescriptions or medications  No    Is transportation to your appointment needed  No    I have advised the patient to call PCP with any new or worsening symptoms  Hilton Bedoya - MR/MA    Living Arrangements  Friends    Are you recieving any outpatient services  No    Are you recieving home care services  No    Types of home care services  Nurse visit    Are you using any community resources  No    Have you fallen in the last 12 months  No    How many times  Once    Interperter language line needed  No            Assessment & Plan  Adrenal gland anomaly  Referral entered to endo   Orders:    Ambulatory Referral to Endocrinology; Future    Sarcoidosis  Recent flare up causing hypercalcemia  On prednisone taper  Need to schedule with rheum, pulm and GI for f/u  Also need to schedule with ophthal to r/o uveitis.     Orders:    Calcium, ionized; Future    CBC and differential; Future    Comprehensive metabolic panel; Future    Angiotensin converting enzyme; Future    Magnesium; Future    Impacted cerumen of right ear    Orders:    Ambulatory Referral to Otolaryngology; Future    Hypercalcemia  Improving  Will  "recheck   PTH-rp pending  F/u with renal            History of Present Illness     HPI  TCM  Recent admission hypercalcemia 2/2 sarcoidosis flare  On prednisone taper  Surgery on hold  Had iron infusion today    Review of Systems        Objective     /68 (BP Location: Right arm, Patient Position: Sitting, Cuff Size: Adult)   Pulse 71   Temp 98.5 °F (36.9 °C) (Probe)   Ht 5' 1.52\" (1.563 m)   Wt 54.1 kg (119 lb 3.2 oz)   SpO2 98%   BMI 22.15 kg/m²     Physical Exam    "

## 2024-09-18 NOTE — PATIENT INSTRUCTIONS
Please schedule with rheumatology (sarcoid), hepatology (sarcoid), pulmonology (sarcoid, nodule), ophthalmoloy (look for uveitis), endocrinology (adrenal nodule and thyroid), nephrologist (calcium).     Schedule with ENT to remove ear wax in right ear.

## 2024-09-18 NOTE — PROGRESS NOTES
Received for Feraheme. No complaints offered. Tolerated treatment without issue. Pt to return for port flush 10/30 at 115. AVS declined.

## 2024-09-19 ENCOUNTER — OFFICE VISIT (OUTPATIENT)
Dept: PALLIATIVE MEDICINE | Facility: CLINIC | Age: 59
End: 2024-09-19
Payer: COMMERCIAL

## 2024-09-19 ENCOUNTER — PREP FOR PROCEDURE (OUTPATIENT)
Dept: UROLOGY | Facility: AMBULATORY SURGERY CENTER | Age: 59
End: 2024-09-19

## 2024-09-19 ENCOUNTER — TELEPHONE (OUTPATIENT)
Age: 59
End: 2024-09-19

## 2024-09-19 VITALS
OXYGEN SATURATION: 97 % | HEIGHT: 61 IN | TEMPERATURE: 97.4 F | BODY MASS INDEX: 22.28 KG/M2 | DIASTOLIC BLOOD PRESSURE: 58 MMHG | SYSTOLIC BLOOD PRESSURE: 100 MMHG | WEIGHT: 118 LBS | HEART RATE: 72 BPM

## 2024-09-19 DIAGNOSIS — Z51.5 PALLIATIVE CARE PATIENT: ICD-10-CM

## 2024-09-19 DIAGNOSIS — R39.89 SUSPECTED UTI: ICD-10-CM

## 2024-09-19 DIAGNOSIS — C18.7 MALIGNANT NEOPLASM OF SIGMOID COLON (HCC): ICD-10-CM

## 2024-09-19 DIAGNOSIS — N13.5 URETERAL STRICTURE: Primary | ICD-10-CM

## 2024-09-19 DIAGNOSIS — G89.3 CANCER RELATED PAIN: ICD-10-CM

## 2024-09-19 PROCEDURE — 99214 OFFICE O/P EST MOD 30 MIN: CPT | Performed by: STUDENT IN AN ORGANIZED HEALTH CARE EDUCATION/TRAINING PROGRAM

## 2024-09-19 RX ORDER — OXYCODONE HYDROCHLORIDE 5 MG/1
5 TABLET ORAL EVERY 4 HOURS PRN
Qty: 120 TABLET | Refills: 0 | Status: SHIPPED | OUTPATIENT
Start: 2024-09-19

## 2024-09-19 NOTE — PROGRESS NOTES
Ambulatory Visit - Progress Note  Name: Danielle Haque      : 1965      MRN: [id number]  Encounter Provider: Jhon Banks DO  Encounter Date: 2024   Encounter department: [facility]    Assessment & Plan  Malignant neoplasm of sigmoid colon (HCC)  Patient following Colorectal Surgery (Dr. Umanzor) with future plans of colostomy reversal.  This has been delayed in the interim due to previous hospitalization from 9/10/2024 - 2024 with hypercalcemia and on steroid therapy.    Patient to have follow-up with Dr. Ferris of Medical Oncology for further monitoring and recommendations.      Orders:    oxyCODONE (Roxicodone) 5 immediate release tablet; Take 1 tablet (5 mg total) by mouth every 4 (four) hours as needed for moderate pain Max Daily Amount: 30 mg    naloxone (NARCAN) 4 mg/0.1 mL nasal spray; Administer 1 spray into a nostril. If no response after 2-3 minutes, give another dose in the other nostril using a new spray. For use in emergencies for opioid / pain medication reversal for accidental ingestion, respiratory depression, sedation or concerns for overdose.    Cancer related pain  Pain remains well managed with OxyIR 5mg q4 hours PRN.  However, has had chronic  and groin/pelvic pain (not related to her cancer). She has follow-up with Urology for planned procedure with a Cystoscopy and lithotripsy. Not currently following Gyn, but is following Urology.    Patient did require a higher dose this morning of 10mg of the OxyIR due to pain. She denies fevers, chills, chest pain, shortness of breath, or other new symptoms at this time.    ER Precautions  Watch for red flag symptoms including, but not limited to fevers, chills, chest pain, shortness of breath, intractable nausea/vomiting/diarrhea, or acute intractable pain (especially if pain is new or has changed).      Orders:    oxyCODONE (Roxicodone) 5 immediate release tablet; Take 1 tablet (5 mg total) by mouth every 4  (four) hours as needed for moderate pain Max Daily Amount: 30 mg    naloxone (NARCAN) 4 mg/0.1 mL nasal spray; Administer 1 spray into a nostril. If no response after 2-3 minutes, give another dose in the other nostril using a new spray. For use in emergencies for opioid / pain medication reversal for accidental ingestion, respiratory depression, sedation or concerns for overdose.    Palliative care patient  Psychosocial   Supportive listening provided  Normalized experience of patient/family  Provided anxiety containment     Referrals Placed / Medical Equipment Ordered  -None today    Follow-Up Recommendations  -Follow-up with PCP and current medical specialists  -Follow-up with palliative care: 6 weeks or sooner as needed    Orders:    oxyCODONE (Roxicodone) 5 immediate release tablet; Take 1 tablet (5 mg total) by mouth every 4 (four) hours as needed for moderate pain Max Daily Amount: 30 mg    naloxone (NARCAN) 4 mg/0.1 mL nasal spray; Administer 1 spray into a nostril. If no response after 2-3 minutes, give another dose in the other nostril using a new spray. For use in emergencies for opioid / pain medication reversal for accidental ingestion, respiratory depression, sedation or concerns for overdose.      Decisional apparatus: Patient is competent on my exam today. If competence is lost, patient's substitute decision maker would default to children by [place] Act 169.   Advance Directive / Living Will / POLST: None on file     PDMP Review: I have reviewed the patient's controlled substance dispensing history in the Prescription Drug Monitoring Program in compliance with the [facility] regulations before prescribing any controlled substances.    History of Present Illness     Danielle Haque is a 58 y.o. female who presents for follow-up today with Palliative Care diagnosis of rectal cancer of the sigmoid colon (in remission).  Currently pending colostomy reversal, however, delayed for the moment given patient's  hypercalcemia and steroid use.    Patient has an appointment with Urology in October 18, 2024 for cystoscopy ureteroscopy with lithotripsy.    Patient endorses groin pain and is following Urology for this.  Abdominal pain is well controlled at this time.  Patient with follow-up with Medical Oncology. Per records, patient is in remission. Will hopefully discuss plans for opioid taper in the near future if patient remains in remission without issues moving forward.    History obtained from : patient and ex-  Review of Systems   Constitutional:  Negative for activity change, chills and fever.   HENT:  Negative for trouble swallowing.    Eyes:  Negative for pain and visual disturbance.   Respiratory:  Negative for cough and shortness of breath.    Cardiovascular:  Negative for chest pain and palpitations.   Gastrointestinal:  Positive for abdominal pain (well controlled at this time.). Negative for constipation, diarrhea, nausea and vomiting.   Genitourinary:  Negative for difficulty urinating and dysuria.        Groin pain,  pain.   Musculoskeletal:  Negative for arthralgias, back pain and myalgias.   Skin:  Negative for rash and wound.   Neurological:  Negative for numbness.   All other systems reviewed and are negative.    Pertinent Medical History       Medical History Reviewed by provider this encounter:  Tobacco  Allergies  Meds  Problems  Med Hx  Surg Hx  Fam Hx       Past Medical History   Past Medical History:   Diagnosis Date    Bleeding from colostomy stoma (HCC)      states ileostomy , not colostomy    Cancer (HCC)     Colon cancer (HCC)     Elevated serum creatinine 09/11/2024    Fall     Headache     Hemochromatosis, unspecified     History of transfusion     2022 - no adverse reaction    Hypertension     Hypokalemia 09/12/2024    Kidney calculi     Kidney stone     Liver disease     hemangioma    Muscle weakness     Personal history of COVID-19 12/2022    Sarcoidosis     Seizures  (HCC)     2022    Shingles      Past Surgical History:   Procedure Laterality Date    ABSCESS DRAINAGE      left breast    BREAST BIOPSY      CHEST TUBE INSERTION      COLON SURGERY      colostomy    COLONOSCOPY      FL GUIDED CENTRAL VENOUS ACCESS DEVICE INSERTION  03/21/2023    FL RETROGRADE PYELOGRAM  01/23/2023    FL RETROGRADE PYELOGRAM  04/03/2023    FL RETROGRADE PYELOGRAM  7/21/2023    FL RETROGRADE PYELOGRAM  10/11/2023    FL RETROGRADE PYELOGRAM  12/15/2023    FL RETROGRADE PYELOGRAM  2/8/2024    FL RETROGRADE PYELOGRAM  5/10/2024    IR BIOPSY LIVER MASS  05/09/2023    LUNG BIOPSY      NV CYSTO BLADDER W/URETERAL CATHETERIZATION Bilateral 07/21/2023    Procedure: CYSTOSCOPY URETEROSCOPY RETROGRADE PYELOGRAM WITH BILATERAL STENT URETERAL EXCHANGE; LEFT LASER LITHOTRIPSY AND STONE BASKET RETRIEVAL;  Surgeon: José Luis Stephens MD;  [facility];  Service: Urology    NV CYSTO BLADDER W/URETERAL CATHETERIZATION Bilateral 12/15/2023    Procedure: CYSTOSCOPY, BILATERAL  RETROGRADE PYELOGRAM , STENT EXCHANGE RIGHT , LEFT URETEROSCOPY ,  HOLMIUM LASER WITH INSERTION LEFT URETERAL STENT;  Surgeon: Derick Bhakta MD;  [facility];  Service: Urology    NV CYSTO BLADDER W/URETERAL CATHETERIZATION Left 2/8/2024    Procedure: CYSTOSCOPY B/L RETROGRADE PYELOGRAM WITH B/L T URETERALSTENT EXCHANGE,LEFT URETEROSCOPY, DILATION LEFT URETERAL STICTURE;  Surgeon: José Luis Stephens MD;  [facility];  Service: Urology    NV CYSTO BLADDER W/URETERAL CATHETERIZATION Bilateral 5/10/2024    Procedure: CYSTOSCOPY RETROGRADE PYELOGRAM WITH INSERTION STENT URETERAL;  Surgeon: José Luis Stephens MD;  [facility];  Service: Urology    NV CYSTO W/INSERT URETERAL STENT Bilateral 5/10/2024    Procedure: EXCHANGE STENT URETERAL;  Surgeon: José Luis Stephens MD;  [facility];  Service: Urology    NV CYSTO/URETERO W/LITHOTRIPSY &INDWELL STENT INSRT Bilateral 01/23/2023     Procedure: CYSTOSCOPY  RIGHT URETEROSCOPY WITH LITHOTRIPSY HOLMIUM LASER, BILATERAL RETROGRADE PYELOGRAM AND BILATERAL  URETERAL STENT INSERTION;  Surgeon: José Luis Stephens MD;  [facility];  Service: Urology    MT CYSTO/URETERO W/LITHOTRIPSY &INDWELL STENT INSRT Right 04/03/2023    Procedure: RIGHT URETEROSCOPY WITH LITHOTRIPSY HOLMIUM LASER, RETROGRADE PYELOGRAM; BILATERAL EXCHANGE STENT URETERAL;  Surgeon: José Luis Stephens MD;  [facility];  Service: Urology    MT CYSTO/URETERO W/LITHOTRIPSY &INDWELL STENT INSRT Bilateral 10/11/2023    Procedure: CYSTOSCOPY URETEROSCOPY RETROGRADE PYELOGRAM AND INSERTION STENT URETERAL DIAGNOSTINC RIGHT URETEROSCOPY, REMOVAL STENT LEFT SIDE;  Surgeon: José Luis Stephens MD;  [facility];  Service: Urology    MT INSJ TUNNELED CTR VAD W/SUBQ PORT AGE 5 YR/> N/A 03/21/2023    Procedure: INSERTION VENOUS PORT ( PORT-A-CATH) IR;  Surgeon: Mark Amos DO;  [facility];  Service: Interventional Radiology    MT LAPS COLECTOMY PRTL W/COLOPXTSTMY LW ANAST N/A 8/17/2023    Procedure: RESECTION COLON LOW ANTERIOR LAPAROSCOPIC WITH ROBOTICS;  Surgeon: Sree Umanzor MD;  [facility];  Service: Colorectal    TONSILLECTOMY      URINARY SURGERY Bilateral     bilateral stents     Family History   Problem Relation Age of Onset    Cancer Mother     Cirrhosis Father     Breast cancer Neg Hx     Breast cancer additional onset Neg Hx      Current Outpatient Medications on File Prior to Visit   Medication Sig Dispense Refill    amLODIPine (NORVASC) 10 mg tablet TAKE 1 TABLET BY MOUTH EVERY DAY 90 tablet 1    cyanocobalamin (VITAMIN B-12) 1000 MCG tablet Take 1,000 mcg by mouth daily      estrogens, conjugated (Premarin) vaginal cream Insert 0.3 g into the vagina 3 (three) times a week 30 g 6    fenofibrate (TRICOR) 145 mg tablet Take 1 tablet (145 mg total) by mouth daily 90 tablet 3    ferrous sulfate 324 (65 Fe) mg Take 1 tablet  "(324 mg total) by mouth daily before breakfast 30 tablet 0    ondansetron (ZOFRAN) 4 mg tablet Take 1 tablet (4 mg total) by mouth every 8 (eight) hours as needed for nausea or vomiting 20 tablet 2    oxybutynin (DITROPAN) 5 mg tablet Take 1 tablet (5 mg total) by mouth every 6 (six) hours as needed (bladder spasms) 90 tablet 3    predniSONE 10 mg tablet Take 4 tablets (40 mg total) by mouth daily for 4 days, THEN 3 tablets (30 mg total) daily for 7 days, THEN 2 tablets (20 mg total) daily for 7 days, THEN 1 tablet (10 mg total) daily for 7 days. Do not start before September 14, 2024. 58 tablet 0    tamsulosin (FLOMAX) 0.4 mg Take 1 capsule (0.4 mg total) by mouth daily with dinner 90 capsule 3    ursodiol (ACTIGALL) 300 mg capsule TAKE 3 CAPSULES (900 MG TOTAL) BY MOUTH IN THE MORNING 90 capsule 1    [DISCONTINUED] oxyCODONE (Roxicodone) 5 immediate release tablet Take 1 tablet (5 mg total) by mouth every 4 (four) hours as needed for moderate pain Max Daily Amount: 30 mg 120 tablet 0    potassium chloride (MICRO-K) 10 MEQ CR capsule TAKE 4 CAPSULES (40 MEQ TOTAL) BY MOUTH 2 (TWO) TIMES A  capsule 5     Current Facility-Administered Medications on File Prior to Visit   Medication Dose Route Frequency Provider Last Rate Last Admin    alteplase (CATHFLO) injection 2 mg  2 mg Intracatheter Q1MIN PRN Sree Oliva MD         Allergies   Allergen Reactions    Oxaliplatin Shortness Of Breath     Reactions occurred with 2nd and 3rd infusions (about 1-3 hours from initiation of infusion) and required treatment with steroids and antihistamines. Please refer to allergy note on 2/7/2023 for detailed description of her reactions.    Morphine Nausea Only     \"Every dose made me nauseous\" (oral dosing only, can tolerate IV morphine)    Potassium Chloride Other (See Comments)     Pt reports \"burning\" with Iv potassium administration and wishes for it to be added to chart      Current Outpatient Medications on File Prior " to Visit   Medication Sig Dispense Refill    amLODIPine (NORVASC) 10 mg tablet TAKE 1 TABLET BY MOUTH EVERY DAY 90 tablet 1    cyanocobalamin (VITAMIN B-12) 1000 MCG tablet Take 1,000 mcg by mouth daily      estrogens, conjugated (Premarin) vaginal cream Insert 0.3 g into the vagina 3 (three) times a week 30 g 6    fenofibrate (TRICOR) 145 mg tablet Take 1 tablet (145 mg total) by mouth daily 90 tablet 3    ferrous sulfate 324 (65 Fe) mg Take 1 tablet (324 mg total) by mouth daily before breakfast 30 tablet 0    ondansetron (ZOFRAN) 4 mg tablet Take 1 tablet (4 mg total) by mouth every 8 (eight) hours as needed for nausea or vomiting 20 tablet 2    oxybutynin (DITROPAN) 5 mg tablet Take 1 tablet (5 mg total) by mouth every 6 (six) hours as needed (bladder spasms) 90 tablet 3    predniSONE 10 mg tablet Take 4 tablets (40 mg total) by mouth daily for 4 days, THEN 3 tablets (30 mg total) daily for 7 days, THEN 2 tablets (20 mg total) daily for 7 days, THEN 1 tablet (10 mg total) daily for 7 days. Do not start before September 14, 2024. 58 tablet 0    tamsulosin (FLOMAX) 0.4 mg Take 1 capsule (0.4 mg total) by mouth daily with dinner 90 capsule 3    ursodiol (ACTIGALL) 300 mg capsule TAKE 3 CAPSULES (900 MG TOTAL) BY MOUTH IN THE MORNING 90 capsule 1    [DISCONTINUED] oxyCODONE (Roxicodone) 5 immediate release tablet Take 1 tablet (5 mg total) by mouth every 4 (four) hours as needed for moderate pain Max Daily Amount: 30 mg 120 tablet 0    potassium chloride (MICRO-K) 10 MEQ CR capsule TAKE 4 CAPSULES (40 MEQ TOTAL) BY MOUTH 2 (TWO) TIMES A  capsule 5     Current Facility-Administered Medications on File Prior to Visit   Medication Dose Route Frequency Provider Last Rate Last Admin    alteplase (CATHFLO) injection 2 mg  2 mg Intracatheter Q1MIN PRN Sree Oliva MD          Social History     Tobacco Use    Smoking status: Never     Passive exposure: Past    Smokeless tobacco: Never   Vaping Use    Vaping  "status: Never Used   Substance and Sexual Activity    Alcohol use: Never    Drug use: Never    Sexual activity: Not Currently     Objective     /58 (BP Location: Left arm, Patient Position: Sitting, Cuff Size: Standard)   Pulse 72   Temp (!) 97.4 °F (36.3 °C) (Temporal)   Ht 5' 1\" (1.549 m)   Wt 53.5 kg (118 lb)   SpO2 97%   BMI 22.30 kg/m²     Physical Exam  Vitals reviewed.   Constitutional:       General: She is not in acute distress.     Appearance: She is well-developed. She is ill-appearing (chronically). She is not toxic-appearing or diaphoretic.   HENT:      Head: Normocephalic and atraumatic.      Nose: Nose normal.      Mouth/Throat:      Mouth: Mucous membranes are moist.   Eyes:      General:         Right eye: No discharge.         Left eye: No discharge.   Cardiovascular:      Rate and Rhythm: Normal rate.   Pulmonary:      Effort: Pulmonary effort is normal. No respiratory distress.   Abdominal:      General: Abdomen is flat. There is no distension.   Musculoskeletal:         General: No swelling.      Right lower leg: No edema.      Left lower leg: No edema.   Skin:     General: Skin is warm and dry.      Coloration: Skin is not jaundiced or pale.   Neurological:      General: No focal deficit present.      Mental Status: She is alert.   Psychiatric:         Mood and Affect: Mood normal.         Behavior: Behavior normal.         Thought Content: Thought content normal.         Judgment: Judgment normal.         Recent labs:  Lab Results   Component Value Date/Time    SODIUM 140 09/13/2024 06:30 AM    K 3.5 09/13/2024 06:30 AM    BUN 15 09/13/2024 06:30 AM    CREATININE 1.15 09/13/2024 06:30 AM    GLUC 85 09/13/2024 06:30 AM    CALCIUM 11.0 (H) 09/13/2024 06:30 AM    AST 51 (H) 09/10/2024 05:22 PM    ALT 41 09/10/2024 05:22 PM    ALB 3.6 09/10/2024 05:22 PM    TP 5.0 (L) 09/12/2024 05:26 AM    TP 6.3 (L) 09/10/2024 05:22 PM    EGFR 53 09/13/2024 06:30 AM     Lab Results   Component Value " Date/Time    HGB 8.7 (L) 09/13/2024 06:37 AM    WBC 1.96 (L) 09/13/2024 06:37 AM     09/13/2024 06:37 AM    INR 0.99 08/15/2024 07:30 AM    PTT 27 03/02/2024 03:57 AM     Lab Results   Component Value Date/Time    YUR2ERIVKUPI 1.063 09/13/2024 06:30 AM       Recent Imaging:  Procedure: CT chest abdomen pelvis w contrast    Result Date: 6/27/2024  Narrative: CT CHEST, ABDOMEN AND PELVIS WITH IV CONTRAST INDICATION: R91.8: Other nonspecific abnormal finding of lung field C20: Malignant neoplasm of rectum. COMPARISON: Comparison is made to prior studies, the most recent CT scan is dated March 11, 2024. TECHNIQUE: CT examination of the chest, abdomen and pelvis was performed. Multiplanar 2D reformatted images were created from the source data. This examination, like all CT scans performed in the [facility], was performed utilizing techniques to minimize radiation dose exposure, including the use of iterative reconstruction and automated exposure control. Radiation dose length product (DLP) for this visit: 541.65 mGy-cm IV Contrast: 85 mL of iohexol (OMNIPAQUE) Enteric Contrast: Not administered. FINDINGS: CHEST LUNGS: Bilateral pulmonary nodules, some which are cavitary, are unchanged. Index nodule at are as follows: 1.4 x 1 cm nodule in the lingula (4, 143) previously measured 1.5 x 0.9 cm. Index left lower lobe nodule measures 0.7 cm unchanged (series 4 image 169). 0.7 cm nodule centrally in the right lower lobe (4, 177) is unchanged. . Nodularity along the fissures also unchanged compatible with known sarcoidosis. PLEURA: Unremarkable. HEART/GREAT VESSELS: Heart is unremarkable for patient's age. No thoracic aortic aneurysm. MEDIASTINUM AND DUSTY: Unremarkable. CHEST WALL AND LOWER NECK: Mediport catheter with tip at the cavoatrial junction. Stable size of 1.7 cm nodule in the right lobe of the thyroid gland. ABDOMEN LIVER/BILIARY TREE: Unremarkable. GALLBLADDER: No calcified gallstones. No  pericholecystic inflammatory change. SPLEEN: Multiple hypodense nodules throughout the spleen are unchanged compatible with known sarcoidosis. 1 cm nodule right adrenal gland is unchanged. Left adrenal gland is normal. PANCREAS: Unremarkable. ADRENAL GLANDS: Unremarkable. KIDNEYS/URETERS: Bilateral ureteral stents are in appropriate position, with slight increase in pelvicaliectasis on the right and slight decrease on the left. Bilateral nonobstructing calculi are unchanged. Focal area of encrustation about the proximal right stent is also unchanged. STOMACH AND BOWEL: Right upper quadrant diverting loop ileostomy. Rectal anastomosis is intact. No evidence of locally recurrent tumor. No bowel obstruction. APPENDIX: No findings to suggest appendicitis. ABDOMINOPELVIC CAVITY: No ascites. No pneumoperitoneum. No lymphadenopathy. VESSELS: Unremarkable for patient's age. PELVIS REPRODUCTIVE ORGANS: Unremarkable for patient's age. URINARY BLADDER: Collapsed and not well visualized but grossly unremarkable. ABDOMINAL WALL/INGUINAL REGIONS: Unremarkable. BONES: No acute fracture or suspicious osseous lesion.     Impression: Stable bilateral pulmonary nodules, some which are cavitary, are unchanged. Multiple low-density lesions throughout the spleen likely due to sarcoid, also unchanged. Bilateral hydronephrosis, decreased on the left and slightly increased on the right. Bilateral nonobstructing calculi unchanged. No new findings in the chest abdomen or pelvis. Workstation performed: [id number]      Administrative Statements   I have spent a total time of 32 minutes in caring for this patient on the day of the visit/encounter including Risks and benefits of tx options, Instructions for management, Patient and family education, Importance of tx compliance, Risk factor reductions, Impressions, Counseling / Coordination of care, Documenting in the medical record, Reviewing / ordering tests, medicine, procedures  , and Obtaining  or reviewing history  . Topics discussed with the patient / family include symptom assessment and management, medication review, psychosocial support, supportive listening, and anticipatory guidance.

## 2024-09-19 NOTE — ASSESSMENT & PLAN NOTE
Patient following Colorectal Surgery (Dr. Umanzor) with future plans of colostomy reversal.  This has been delayed in the interim due to previous hospitalization from 9/10/2024 - 9/13/2024 with hypercalcemia and on steroid therapy.    Patient to have follow-up with Dr. Ferris of Medical Oncology for further monitoring and recommendations.      Orders:    oxyCODONE (Roxicodone) 5 immediate release tablet; Take 1 tablet (5 mg total) by mouth every 4 (four) hours as needed for moderate pain Max Daily Amount: 30 mg    naloxone (NARCAN) 4 mg/0.1 mL nasal spray; Administer 1 spray into a nostril. If no response after 2-3 minutes, give another dose in the other nostril using a new spray. For use in emergencies for opioid / pain medication reversal for accidental ingestion, respiratory depression, sedation or concerns for overdose.

## 2024-09-19 NOTE — PATIENT INSTRUCTIONS
It was a pleasure speaking with you today.    As discussed:  -Continue your medications as prescribed.  -Continue with a bowel regimen to avoid constipation.    Follow-up in: 6 weeks or sooner    Please do not hesitate to call me sooner should you have any questions/concerns or needs.

## 2024-09-19 NOTE — TELEPHONE ENCOUNTER
Called and spoke to patient family Mateo Maxwell who is on medical communication consent regarding a Hepatology follow up with Dr. Machuca. Patient Schuled on 11/15 with Dr. Machuca for a f/up

## 2024-09-19 NOTE — ASSESSMENT & PLAN NOTE
Psychosocial   Supportive listening provided  Normalized experience of patient/family  Provided anxiety containment     Referrals Placed / Medical Equipment Ordered  -None today    Follow-Up Recommendations  -Follow-up with PCP and current medical specialists  -Follow-up with palliative care: 6 weeks or sooner as needed    Orders:    oxyCODONE (Roxicodone) 5 immediate release tablet; Take 1 tablet (5 mg total) by mouth every 4 (four) hours as needed for moderate pain Max Daily Amount: 30 mg    naloxone (NARCAN) 4 mg/0.1 mL nasal spray; Administer 1 spray into a nostril. If no response after 2-3 minutes, give another dose in the other nostril using a new spray. For use in emergencies for opioid / pain medication reversal for accidental ingestion, respiratory depression, sedation or concerns for overdose.

## 2024-09-19 NOTE — ASSESSMENT & PLAN NOTE
Pain remains well managed with OxyIR 5mg q4 hours PRN.  However, has had chronic  and groin/pelvic pain (not related to her cancer). She has follow-up with Urology for planned procedure with a Cystoscopy and lithotripsy. Not currently following Gyn, but is following Urology.    Patient did require a higher dose this morning of 10mg of the OxyIR due to pain. She denies fevers, chills, chest pain, shortness of breath, or other new symptoms at this time.    ER Precautions  Watch for red flag symptoms including, but not limited to fevers, chills, chest pain, shortness of breath, intractable nausea/vomiting/diarrhea, or acute intractable pain (especially if pain is new or has changed).      Orders:    oxyCODONE (Roxicodone) 5 immediate release tablet; Take 1 tablet (5 mg total) by mouth every 4 (four) hours as needed for moderate pain Max Daily Amount: 30 mg    naloxone (NARCAN) 4 mg/0.1 mL nasal spray; Administer 1 spray into a nostril. If no response after 2-3 minutes, give another dose in the other nostril using a new spray. For use in emergencies for opioid / pain medication reversal for accidental ingestion, respiratory depression, sedation or concerns for overdose.

## 2024-09-21 LAB — PTH RELATED PROT SERPL-SCNC: <2 PMOL/L

## 2024-09-23 ENCOUNTER — HOSPITAL ENCOUNTER (INPATIENT)
Facility: HOSPITAL | Age: 59
LOS: 2 days | Discharge: HOME | DRG: 463 | End: 2024-09-27
Attending: EMERGENCY MEDICINE | Admitting: INTERNAL MEDICINE
Payer: COMMERCIAL

## 2024-09-23 ENCOUNTER — TELEPHONE (OUTPATIENT)
Dept: NEPHROLOGY | Facility: CLINIC | Age: 59
End: 2024-09-23

## 2024-09-23 ENCOUNTER — APPOINTMENT (EMERGENCY)
Dept: CT IMAGING | Facility: HOSPITAL | Age: 59
DRG: 463 | End: 2024-09-23
Payer: COMMERCIAL

## 2024-09-23 DIAGNOSIS — I10 PRIMARY HYPERTENSION: ICD-10-CM

## 2024-09-23 DIAGNOSIS — N39.0 UTI (URINARY TRACT INFECTION): ICD-10-CM

## 2024-09-23 DIAGNOSIS — Z96.0 URETERAL STENT PRESENT: ICD-10-CM

## 2024-09-23 DIAGNOSIS — N20.0 NEPHROLITHIASIS: ICD-10-CM

## 2024-09-23 DIAGNOSIS — N13.30 BILATERAL HYDRONEPHROSIS: ICD-10-CM

## 2024-09-23 DIAGNOSIS — N12 PYELONEPHRITIS: Primary | ICD-10-CM

## 2024-09-23 LAB
ALBUMIN SERPL BCG-MCNC: 3.9 G/DL (ref 3.5–5)
ALP SERPL-CCNC: 74 U/L (ref 34–104)
ALT SERPL W P-5'-P-CCNC: 16 U/L (ref 7–52)
ANION GAP SERPL CALCULATED.3IONS-SCNC: 4 MMOL/L (ref 4–13)
AST SERPL W P-5'-P-CCNC: 15 U/L (ref 13–39)
BASOPHILS # BLD MANUAL: 0 THOUSAND/UL (ref 0–0.1)
BASOPHILS NFR MAR MANUAL: 0 % (ref 0–1)
BILIRUB SERPL-MCNC: 0.45 MG/DL (ref 0.2–1)
BILIRUB UR QL STRIP: NEGATIVE
BUN SERPL-MCNC: 38 MG/DL (ref 5–25)
CALCIUM SERPL-MCNC: 9.5 MG/DL (ref 8.4–10.2)
CHLORIDE SERPL-SCNC: 105 MMOL/L (ref 96–108)
CLARITY UR: ABNORMAL
CO2 SERPL-SCNC: 25 MMOL/L (ref 21–32)
COLOR UR: COLORLESS
CREAT SERPL-MCNC: 1.06 MG/DL (ref 0.6–1.3)
EOSINOPHIL # BLD MANUAL: 0.08 THOUSAND/UL (ref 0–0.4)
EOSINOPHIL NFR BLD MANUAL: 1 % (ref 0–6)
ERYTHROCYTE [DISTWIDTH] IN BLOOD BY AUTOMATED COUNT: 13.4 % (ref 11.6–15.1)
GFR SERPL CREATININE-BSD FRML MDRD: 58 ML/MIN/1.73SQ M
GLUCOSE SERPL-MCNC: 134 MG/DL (ref 65–140)
GLUCOSE UR STRIP-MCNC: NEGATIVE MG/DL
HCT VFR BLD AUTO: 32 % (ref 34.8–46.1)
HGB BLD-MCNC: 10.3 G/DL (ref 11.5–15.4)
HGB UR QL STRIP.AUTO: ABNORMAL
KETONES UR STRIP-MCNC: NEGATIVE MG/DL
LEUKOCYTE ESTERASE UR QL STRIP: ABNORMAL
LYMPHOCYTES # BLD AUTO: 0.5 THOUSAND/UL (ref 0.6–4.47)
LYMPHOCYTES # BLD AUTO: 6 % (ref 14–44)
MCH RBC QN AUTO: 28.5 PG (ref 26.8–34.3)
MCHC RBC AUTO-ENTMCNC: 32.2 G/DL (ref 31.4–37.4)
MCV RBC AUTO: 89 FL (ref 82–98)
MONOCYTES # BLD AUTO: 0.17 THOUSAND/UL (ref 0–1.22)
MONOCYTES NFR BLD: 2 % (ref 4–12)
NEUTROPHILS # BLD MANUAL: 7.51 THOUSAND/UL (ref 1.85–7.62)
NEUTS SEG NFR BLD AUTO: 91 % (ref 43–75)
NITRITE UR QL STRIP: NEGATIVE
PH UR STRIP.AUTO: 6 [PH]
PLATELET # BLD AUTO: 259 THOUSANDS/UL (ref 149–390)
PLATELET BLD QL SMEAR: ADEQUATE
PMV BLD AUTO: 8.7 FL (ref 8.9–12.7)
POTASSIUM SERPL-SCNC: 4.9 MMOL/L (ref 3.5–5.3)
PROT SERPL-MCNC: 6.3 G/DL (ref 6.4–8.4)
PROT UR STRIP-MCNC: ABNORMAL MG/DL
RBC # BLD AUTO: 3.61 MILLION/UL (ref 3.81–5.12)
RBC MORPH BLD: NORMAL
SODIUM SERPL-SCNC: 134 MMOL/L (ref 135–147)
SP GR UR STRIP.AUTO: 1.01 (ref 1–1.03)
UROBILINOGEN UR STRIP-ACNC: <2 MG/DL
WBC # BLD AUTO: 8.25 THOUSAND/UL (ref 4.31–10.16)

## 2024-09-23 PROCEDURE — 99284 EMERGENCY DEPT VISIT MOD MDM: CPT

## 2024-09-23 PROCEDURE — 81001 URINALYSIS AUTO W/SCOPE: CPT

## 2024-09-23 PROCEDURE — 74177 CT ABD & PELVIS W/CONTRAST: CPT

## 2024-09-23 PROCEDURE — 36415 COLL VENOUS BLD VENIPUNCTURE: CPT

## 2024-09-23 PROCEDURE — 87086 URINE CULTURE/COLONY COUNT: CPT

## 2024-09-23 PROCEDURE — 85007 BL SMEAR W/DIFF WBC COUNT: CPT

## 2024-09-23 PROCEDURE — 96375 TX/PRO/DX INJ NEW DRUG ADDON: CPT

## 2024-09-23 PROCEDURE — 99285 EMERGENCY DEPT VISIT HI MDM: CPT | Performed by: EMERGENCY MEDICINE

## 2024-09-23 PROCEDURE — 80053 COMPREHEN METABOLIC PANEL: CPT

## 2024-09-23 PROCEDURE — 85027 COMPLETE CBC AUTOMATED: CPT

## 2024-09-23 RX ORDER — MORPHINE SULFATE 4 MG/ML
4 INJECTION, SOLUTION INTRAMUSCULAR; INTRAVENOUS ONCE
Status: COMPLETED | OUTPATIENT
Start: 2024-09-23 | End: 2024-09-23

## 2024-09-23 RX ADMIN — MORPHINE SULFATE 4 MG: 4 INJECTION INTRAVENOUS at 23:07

## 2024-09-23 NOTE — TELEPHONE ENCOUNTER
Arminda called in from the pod needing assistance scheduled a hospital follow up with Dr. Reyes in [place]. Put on her schedule 11/11 at 2 pm.   She was in hospital from 9/10 till 9/13 and had a consult from Genaro Choi.

## 2024-09-24 PROBLEM — R93.5 ABNORMAL CT SCAN, PELVIS: Status: ACTIVE | Noted: 2024-09-24

## 2024-09-24 PROBLEM — N39.0 UTI (URINARY TRACT INFECTION): Status: ACTIVE | Noted: 2024-09-24

## 2024-09-24 PROBLEM — N13.30 BILATERAL HYDRONEPHROSIS: Status: ACTIVE | Noted: 2024-09-24

## 2024-09-24 LAB
BACTERIA UR QL AUTO: ABNORMAL /HPF
NON-SQ EPI CELLS URNS QL MICRO: ABNORMAL /HPF
RBC #/AREA URNS AUTO: ABNORMAL /HPF
WBC #/AREA URNS AUTO: ABNORMAL /HPF
WBC CLUMPS # UR AUTO: PRESENT /UL

## 2024-09-24 PROCEDURE — 99223 1ST HOSP IP/OBS HIGH 75: CPT | Performed by: PHYSICIAN ASSISTANT

## 2024-09-24 PROCEDURE — 99223 1ST HOSP IP/OBS HIGH 75: CPT | Performed by: INTERNAL MEDICINE

## 2024-09-24 PROCEDURE — 96365 THER/PROPH/DIAG IV INF INIT: CPT

## 2024-09-24 PROCEDURE — 99222 1ST HOSP IP/OBS MODERATE 55: CPT | Performed by: UROLOGY

## 2024-09-24 PROCEDURE — 96376 TX/PRO/DX INJ SAME DRUG ADON: CPT

## 2024-09-24 RX ORDER — OXYCODONE HYDROCHLORIDE 5 MG/1
5 TABLET ORAL EVERY 6 HOURS PRN
Status: DISCONTINUED | OUTPATIENT
Start: 2024-09-24 | End: 2024-09-27 | Stop reason: HOSPADM

## 2024-09-24 RX ORDER — AMLODIPINE BESYLATE 10 MG/1
10 TABLET ORAL DAILY
Status: DISCONTINUED | OUTPATIENT
Start: 2024-09-24 | End: 2024-09-24

## 2024-09-24 RX ORDER — OXYBUTYNIN CHLORIDE 5 MG/1
5 TABLET ORAL EVERY 6 HOURS PRN
Status: DISCONTINUED | OUTPATIENT
Start: 2024-09-24 | End: 2024-09-27 | Stop reason: HOSPADM

## 2024-09-24 RX ORDER — ACETAMINOPHEN 325 MG/1
650 TABLET ORAL EVERY 6 HOURS PRN
Status: DISCONTINUED | OUTPATIENT
Start: 2024-09-24 | End: 2024-09-27 | Stop reason: HOSPADM

## 2024-09-24 RX ORDER — TAMSULOSIN HYDROCHLORIDE 0.4 MG/1
0.4 CAPSULE ORAL
Status: DISCONTINUED | OUTPATIENT
Start: 2024-09-24 | End: 2024-09-27 | Stop reason: HOSPADM

## 2024-09-24 RX ORDER — HEPARIN SODIUM 5000 [USP'U]/ML
5000 INJECTION, SOLUTION INTRAVENOUS; SUBCUTANEOUS EVERY 8 HOURS SCHEDULED
Status: DISCONTINUED | OUTPATIENT
Start: 2024-09-24 | End: 2024-09-27 | Stop reason: HOSPADM

## 2024-09-24 RX ORDER — FENOFIBRATE 145 MG/1
145 TABLET, COATED ORAL DAILY
Status: DISCONTINUED | OUTPATIENT
Start: 2024-09-24 | End: 2024-09-27 | Stop reason: HOSPADM

## 2024-09-24 RX ORDER — PREDNISONE 10 MG/1
10 TABLET ORAL DAILY
Status: DISCONTINUED | OUTPATIENT
Start: 2024-10-01 | End: 2024-09-27 | Stop reason: HOSPADM

## 2024-09-24 RX ORDER — PREDNISONE 20 MG/1
20 TABLET ORAL DAILY
Status: DISCONTINUED | OUTPATIENT
Start: 2024-09-24 | End: 2024-09-27 | Stop reason: HOSPADM

## 2024-09-24 RX ORDER — LIDOCAINE HYDROCHLORIDE 20 MG/ML
1 JELLY TOPICAL DAILY PRN
Status: DISCONTINUED | OUTPATIENT
Start: 2024-09-24 | End: 2024-09-27 | Stop reason: HOSPADM

## 2024-09-24 RX ORDER — URSODIOL 300 MG/1
900 CAPSULE ORAL DAILY
Status: DISCONTINUED | OUTPATIENT
Start: 2024-09-24 | End: 2024-09-27 | Stop reason: HOSPADM

## 2024-09-24 RX ORDER — AMLODIPINE BESYLATE 5 MG/1
5 TABLET ORAL DAILY
Status: DISCONTINUED | OUTPATIENT
Start: 2024-09-25 | End: 2024-09-27 | Stop reason: HOSPADM

## 2024-09-24 RX ORDER — MORPHINE SULFATE 4 MG/ML
4 INJECTION, SOLUTION INTRAMUSCULAR; INTRAVENOUS ONCE
Status: COMPLETED | OUTPATIENT
Start: 2024-09-24 | End: 2024-09-24

## 2024-09-24 RX ORDER — FERROUS SULFATE 325(65) MG
325 TABLET ORAL
Status: DISCONTINUED | OUTPATIENT
Start: 2024-09-24 | End: 2024-09-27 | Stop reason: HOSPADM

## 2024-09-24 RX ORDER — ONDANSETRON 2 MG/ML
4 INJECTION INTRAMUSCULAR; INTRAVENOUS EVERY 6 HOURS PRN
Status: DISCONTINUED | OUTPATIENT
Start: 2024-09-24 | End: 2024-09-27 | Stop reason: HOSPADM

## 2024-09-24 RX ADMIN — Medication 1000 MG: at 00:56

## 2024-09-24 RX ADMIN — FENOFIBRATE 145 MG: 145 TABLET ORAL at 10:15

## 2024-09-24 RX ADMIN — MORPHINE SULFATE 2 MG: 2 INJECTION, SOLUTION INTRAMUSCULAR; INTRAVENOUS at 12:24

## 2024-09-24 RX ADMIN — MORPHINE SULFATE 2 MG: 2 INJECTION, SOLUTION INTRAMUSCULAR; INTRAVENOUS at 04:16

## 2024-09-24 RX ADMIN — URSODIOL 900 MG: 300 CAPSULE ORAL at 10:16

## 2024-09-24 RX ADMIN — FERROUS SULFATE TAB 325 MG (65 MG ELEMENTAL FE) 325 MG: 325 (65 FE) TAB at 10:15

## 2024-09-24 RX ADMIN — TAMSULOSIN HYDROCHLORIDE 0.4 MG: 0.4 CAPSULE ORAL at 16:47

## 2024-09-24 RX ADMIN — OXYCODONE HYDROCHLORIDE 5 MG: 5 TABLET ORAL at 06:13

## 2024-09-24 RX ADMIN — MORPHINE SULFATE 2 MG: 2 INJECTION, SOLUTION INTRAMUSCULAR; INTRAVENOUS at 23:27

## 2024-09-24 RX ADMIN — IOHEXOL 80 ML: 350 INJECTION, SOLUTION INTRAVENOUS at 00:37

## 2024-09-24 RX ADMIN — OXYCODONE HYDROCHLORIDE 5 MG: 5 TABLET ORAL at 20:01

## 2024-09-24 RX ADMIN — MORPHINE SULFATE 4 MG: 4 INJECTION INTRAVENOUS at 00:59

## 2024-09-24 RX ADMIN — CEFTRIAXONE SODIUM 1000 MG: 10 INJECTION, POWDER, FOR SOLUTION INTRAVENOUS at 23:40

## 2024-09-24 RX ADMIN — CYANOCOBALAMIN TAB 500 MCG 1000 MCG: 500 TAB at 10:15

## 2024-09-24 RX ADMIN — OXYBUTYNIN CHLORIDE 5 MG: 5 TABLET ORAL at 10:15

## 2024-09-24 RX ADMIN — HEPARIN SODIUM 5000 UNITS: 5000 INJECTION INTRAVENOUS; SUBCUTANEOUS at 13:49

## 2024-09-24 RX ADMIN — OXYCODONE HYDROCHLORIDE 5 MG: 5 TABLET ORAL at 12:19

## 2024-09-24 RX ADMIN — HEPARIN SODIUM 5000 UNITS: 5000 INJECTION INTRAVENOUS; SUBCUTANEOUS at 21:27

## 2024-09-24 RX ADMIN — AMLODIPINE BESYLATE 10 MG: 10 TABLET ORAL at 10:15

## 2024-09-24 RX ADMIN — PREDNISONE 20 MG: 20 TABLET ORAL at 10:15

## 2024-09-24 RX ADMIN — ACETAMINOPHEN 325MG 650 MG: 325 TABLET ORAL at 10:15

## 2024-09-24 NOTE — ASSESSMENT & PLAN NOTE
CT: Bilateral hydronephrosis, right greater than left, worse compared with June 24, 2024. Areas of urothelial enhancement, as described above, concerning for possible superimposed pyelonephritis. Bilateral ureteral stents are in place. A focal area of concretion/encrustation adjacent to the proximal right right ureteral stent appears similar to June 24, 2024. There is a 5 mm calculus in the mid to distal left ureter adjacent to the left ureteral stent; this appears new compared to the prior study. There is bilateral nephrolithiasis.  Follows with urology and initially was planned for treatment of left ureteral stricture on September 13 however this was delayed given recent admission and was rescheduled for October 18.  During prior urology visits it was felt that the right side would need reconstruction given it is heavily encrusted and not amenable to laparoscopic interventions  ED discussed with urology who felt the patient could be discharged home but given inability to control pain she was prompted for admission  Will consult urology for assistance in management while admitted. Will allow diet as given complex  history and history of bilateral hydro do not feel she will need intervention today.   UA abnormal and concerning for potential UTI - will initiate IV ABX.

## 2024-09-24 NOTE — ASSESSMENT & PLAN NOTE
Maintained on Norvasc 10 mg daily at baseline which will be continued  Bp lower this afternoon. Will reduce norvasc to 5mg   -150s

## 2024-09-24 NOTE — ASSESSMENT & PLAN NOTE
Patient follows with palliative care but her pelvic pain is not felt to be cancer related  Takes oxycodone PRN at home

## 2024-09-24 NOTE — ASSESSMENT & PLAN NOTE
5/25/23 completed a course of neoadjuvant chemoRT with concurrent 5-FU  Follows with rad onc and heme onc as an outpatient  Initial treatment done in [facility] and then transferred care to [facility]

## 2024-09-24 NOTE — ASSESSMENT & PLAN NOTE
Lab Results   Component Value Date    EGFR 58 09/23/2024    EGFR 53 09/13/2024    EGFR 53 09/12/2024    CREATININE 1.06 09/23/2024    CREATININE 1.15 09/13/2024    CREATININE 1.14 09/12/2024

## 2024-09-24 NOTE — ASSESSMENT & PLAN NOTE
History of biopsy proven sarcoidosis in the lung and liver.    Follows with GI/Dr. Machuca in the outpatient setting.    Currently on ursodiol 900 mg daily and was resumed on steroids during most recent admission at 40 mg daily x 5 days and then tapering by 10 mg every week

## 2024-09-24 NOTE — ASSESSMENT & PLAN NOTE
Lab Results   Component Value Date    EGFR 58 09/23/2024    EGFR 53 09/13/2024    EGFR 53 09/12/2024    CREATININE 1.06 09/23/2024    CREATININE 1.15 09/13/2024    CREATININE 1.14 09/12/2024     Creatinine stable and at baseline

## 2024-09-24 NOTE — ED PROVIDER NOTES
1. Pyelonephritis    2. Nephrolithiasis    3. Ureteral stent present      ED Disposition       ED Disposition   Admit    Condition   Stable    Date/Time   Tue Sep 24, 2024  3:04 AM    Comment   Case was discussed with [facility] and the patient's admission status was agreed to be Admission Status: observation status to the service of Dr. Painting .               Assessment & Plan       Medical Decision Making  Differential diagnose includes but not limited to: Nephrolithiasis, pyelonephritis, UTI, ureteral stent migration or malfunction, herpes or other STI STD.    Danielle came to the ER due to burning vaginal pain.  She states she has had this pain for quite some time and has discussed it with urology, which states that this could be a symptom of nephrolithiasis.  On arrival she was uncomfortable.  Given 2 doses of morphine with significant pain control.  Patient has oxycodone at home which she states has not been working at all.  Patient deferred vaginal exam to lower risk of STI STD at this time.  CT scan showed a 5 mm stone adjacent to the ureteral stent with bilateral hydronephrosis.  Also noted findings suspicious for pyelonephritis, UA correlates with significant pyuria and occasional bacteria.  Patient given a dose of Rocephin down here in the ER.  Due to patient's failed outpatient pain control and need for multiple doses here, reached out to urology.  They said she was stable enough for outpatient follow-up, however due to her pain not fully resolved and outpatient failure, decision was made to discuss with the patient with [facility] for admission.  [facility] accepted patient for observation.  Patient admitted in stable condition.    Amount and/or Complexity of Data Reviewed  Labs: ordered. Decision-making details documented in ED Course.  Radiology: ordered.    Risk  Prescription drug management.  Decision regarding hospitalization.                ED Course as of 09/24/24 0342   Mon Sep 23, 2024   6967 Comprehensive metabolic  panel(!)  Calcium within normal limits, no signs of THOR or other abnormalities.   2334 CBC and differential(!)  White blood cells significantly elevated from previous baseline, otherwise at or improved from baseline.   Tue Sep 24, 2024   0040 On reevaluation, patient endorsed moderate relief of symptoms.  However she did note when she urinated it still burns significantly.  Asked again to do external vaginal exam and she deferred once again.   0127 After second dose of pain control, patient endorsed significant relief of symptoms.  Awaiting CT results.       Medications   morphine injection 4 mg (4 mg Intravenous Given 9/23/24 2307)   ceftriaxone (ROCEPHIN) 1 g/50 mL in dextrose IVPB (0 mg Intravenous Stopped 9/24/24 0126)   iohexol (OMNIPAQUE) 350 MG/ML injection (MULTI-DOSE) 80 mL (80 mL Intravenous Given 9/24/24 0037)   morphine injection 4 mg (4 mg Intravenous Given 9/24/24 0059)       History of Present Illness       58-year-old female with complex medical history including malignant sigmoid neoplasm, presents with continued and worsening external vaginal pain.  She describes it as a severe burning sensation on her labia and significant dysuria while urinating.  She also noted that she had a procedure with urology scheduled for 3 days ago, but was unable to due to hospitalization for hypercalcemia.  Has since been rescheduled to October 18.  She says that her urologist describes external vaginal burning as a symptom of likely ureteral strictures/kidney stones.  She has multiple stents placed bilaterally.  Patient states she has oxycodone prescribed at home which has not helped at all.  Denies any other symptoms including abdominal pain, diarrhea/constipation, nausea or vomiting, fevers or chills.        Review of Systems   Constitutional:  Negative for chills and fever.   Respiratory:  Negative for shortness of breath.    Cardiovascular:  Negative for chest pain.   Gastrointestinal:  Negative for abdominal  pain, constipation, diarrhea, nausea and vomiting.   Genitourinary:  Positive for dysuria and vaginal pain.   All other systems reviewed and are negative.          Objective     ED Triage Vitals [09/23/24 2153]   Temperature Pulse Blood Pressure Respirations SpO2 Patient Position - Orthostatic VS   (!) 97.4 °F (36.3 °C) 76 159/73 20 99 % --      Temp Source Heart Rate Source BP Location FiO2 (%) Pain Score    Oral Monitor -- -- 10 - Worst Possible Pain        Physical Exam  Vitals and nursing note reviewed.   Constitutional:       General: She is not in acute distress.     Appearance: She is not ill-appearing.   HENT:      Head: Normocephalic and atraumatic.      Right Ear: External ear normal.      Left Ear: External ear normal.      Nose: Nose normal. No congestion or rhinorrhea.      Mouth/Throat:      Mouth: Mucous membranes are moist.      Pharynx: Oropharynx is clear.   Eyes:      General: No scleral icterus.     Extraocular Movements: Extraocular movements intact.   Cardiovascular:      Rate and Rhythm: Normal rate and regular rhythm.      Pulses: Normal pulses.      Heart sounds: Normal heart sounds.   Pulmonary:      Effort: Pulmonary effort is normal.      Breath sounds: Normal breath sounds.   Abdominal:      Palpations: Abdomen is soft.      Tenderness: There is no abdominal tenderness.   Genitourinary:     Comments:  exam deferred by patient.  Musculoskeletal:         General: Normal range of motion.      Cervical back: Normal range of motion.   Skin:     General: Skin is warm and dry.   Neurological:      General: No focal deficit present.      Mental Status: She is alert and oriented to person, place, and time.   Psychiatric:         Mood and Affect: Mood normal.         Behavior: Behavior normal.         Labs Reviewed   CBC AND DIFFERENTIAL - Abnormal       Result Value    WBC 8.25      RBC 3.61 (*)     Hemoglobin 10.3 (*)     Hematocrit 32.0 (*)     MCV 89      MCH 28.5      MCHC 32.2      RDW  13.4      MPV 8.7 (*)     Platelets 259      Narrative:     This is an appended report.  These results have been appended to a previously verified report.   COMPREHENSIVE METABOLIC PANEL - Abnormal    Sodium 134 (*)     Potassium 4.9      Chloride 105      CO2 25      ANION GAP 4      BUN 38 (*)     Creatinine 1.06      Glucose 134      Calcium 9.5      AST 15      ALT 16      Alkaline Phosphatase 74      Total Protein 6.3 (*)     Albumin 3.9      Total Bilirubin 0.45      eGFR 58      Narrative:     National Kid[employer] Disease Foundation guidelines for Chronic Kidney Disease (CKD):     Stage 1 with normal or high GFR (GFR > 90 mL/min/1.73 square meters)    Stage 2 Mild CKD (GFR = 60-89 mL/min/1.73 square meters)    Stage 3A Moderate CKD (GFR = 45-59 mL/min/1.73 square meters)    Stage 3B Moderate CKD (GFR = 30-44 mL/min/1.73 square meters)    Stage 4 Severe CKD (GFR = 15-29 mL/min/1.73 square meters)    Stage 5 End Stage CKD (GFR <15 mL/min/1.73 square meters)  Note: GFR calculation is accurate only with a steady state creatinine   UA W REFLEX TO MICROSCOPIC WITH REFLEX TO CULTURE - Abnormal    Color, UA Colorless      Clarity, UA Turbid      Specific Gravity, UA 1.011      pH, UA 6.0      Leukocytes, UA Large (*)     Nitrite, UA Negative      Protein, UA Trace (*)     Glucose, UA Negative      Ketones, UA Negative      Urobilinogen, UA <2.0      Bilirubin, UA Negative      Occult Blood, UA Large (*)    URINE MICROSCOPIC - Abnormal    RBC, UA 30-50 (*)     WBC, UA Innumerable (*)     Epithelial Cells Occasional      Bacteria, UA Occasional      WBC Clumps Present     MANUAL DIFFERENTIAL(PHLEBS DO NOT ORDER) - Abnormal    Segmented % 91 (*)     Lymphocytes % 6 (*)     Monocytes % 2 (*)     Eosinophils % 1      Basophils % 0      Absolute Neutrophils 7.51      Absolute Lymphocytes 0.50 (*)     Absolute Monocytes 0.17      Absolute Eosinophils 0.08      Absolute Basophils 0.00      Total Counted        RBC Morphology  Normal      Platelet Estimate Adequate     URINE CULTURE   RBC MORPHOLOGY REFLEX TEST     CT abdomen pelvis with contrast   Final Interpretation by Courtney Fernandez MD (09/24 0159)      Bilateral hydronephrosis, right greater than left, worse compared with June 24, 2024. Areas of urothelial enhancement, as described above, concerning for possible superimposed pyelonephritis. Correlation with urinalysis and urine culture and sensitivity    is recommended.      Bilateral ureteral stents are in place. A focal area of concretion/encrustation adjacent to the proximal right right ureteral stent appears similar to June 24, 2024. There is a 5 mm calculus in the mid to distal left ureter adjacent to the left ureteral    stent; this appears new compared to the prior study. There is bilateral nephrolithiasis.      There is some fluid in the proximal vagina. Clinical correlation recommended. Consider Gynecology consultation, if clinically appropriate.      Other nonemergent findings as above.      The study was marked in EPIC for immediate notification.         Workstation performed: [id number]             Procedures    ED Medication and Procedure Management   Prior to Admission Medications   Prescriptions Last Dose Informant Patient Reported? Taking?   amLODIPine (NORVASC) 10 mg tablet  Self No No   Sig: TAKE 1 TABLET BY MOUTH EVERY DAY   cyanocobalamin (VITAMIN B-12) 1000 MCG tablet  Self Yes No   Sig: Take 1,000 mcg by mouth daily   estrogens, conjugated (Premarin) vaginal cream  Self No No   Sig: Insert 0.3 g into the vagina 3 (three) times a week   fenofibrate (TRICOR) 145 mg tablet  Self No No   Sig: Take 1 tablet (145 mg total) by mouth daily   ferrous sulfate 324 (65 Fe) mg  Self No No   Sig: Take 1 tablet (324 mg total) by mouth daily before breakfast   naloxone (NARCAN) 4 mg/0.1 mL nasal spray   No No   Sig: Administer 1 spray into a nostril. If no response after 2-3 minutes, give another dose in the other nostril  using a new spray. For use in emergencies for opioid / pain medication reversal for accidental ingestion, respiratory depression, sedation or concerns for overdose.   ondansetron (ZOFRAN) 4 mg tablet   No No   Sig: Take 1 tablet (4 mg total) by mouth every 8 (eight) hours as needed for nausea or vomiting   oxyCODONE (Roxicodone) 5 immediate release tablet   No No   Sig: Take 1 tablet (5 mg total) by mouth every 4 (four) hours as needed for moderate pain Max Daily Amount: 30 mg   oxybutynin (DITROPAN) 5 mg tablet   No No   Sig: Take 1 tablet (5 mg total) by mouth every 6 (six) hours as needed (bladder spasms)   potassium chloride (MICRO-K) 10 MEQ CR capsule   No No   Sig: TAKE 4 CAPSULES (40 MEQ TOTAL) BY MOUTH 2 (TWO) TIMES A DAY   predniSONE 10 mg tablet   No No   Sig: Take 4 tablets (40 mg total) by mouth daily for 4 days, THEN 3 tablets (30 mg total) daily for 7 days, THEN 2 tablets (20 mg total) daily for 7 days, THEN 1 tablet (10 mg total) daily for 7 days. Do not start before September 14, 2024.   tamsulosin (FLOMAX) 0.4 mg   No No   Sig: Take 1 capsule (0.4 mg total) by mouth daily with dinner   ursodiol (ACTIGALL) 300 mg capsule   No No   Sig: TAKE 3 CAPSULES (900 MG TOTAL) BY MOUTH IN THE MORNING      Facility-Administered Medications: None     Patient's Medications   Discharge Prescriptions    No medications on file     No discharge procedures on file.     Avelino Whitaker, DO  09/24/24 9420

## 2024-09-24 NOTE — CONSULTS
Consultation - Urology   Name: Danielle Haque 58 y.o. female I MRN: [id number]  Unit/Bed#: S -01 I Date of Admission: 9/23/2024   Date of Service: 9/24/2024 I Hospital Day: 0   Inpatient consult to Urology  Consult performed by: Brittany Miles PA-C  Consult ordered by: Swapna Carnes PA-C        Physician Requesting Evaluation: Ana Alvarado DO   Reason for Evaluation / Principal Problem: possible UTI    Assessment & Plan  Bilateral hydronephrosis  - Patient with bilateral ureteral strictures managed with bilateral ureteral stents  - CT showing stents in good position, focal area of encrustation around proximal right ureteral stent similar to prior study. With 5 mm calculus in mid to distal left ureter. Bilateral hydronephrosis right greater than L, some urothelial enhancement concerning for pyelonephritis  - Afebrile. No leukocytosis  - UA positive however in setting of indwelling ureteral stents.   - Reports new hematuria this afternoon-  due to ureteral stents. Severe vaginal/ urethral pain not accompanied by abdominal or flank pain.  - Cr at baseline  - Planned Left ureteroscopy, holmium laser lithotripsy for treatment of stones and stricture with exchange of R ureteral stent by Dr. Stephens    Plan:  - Pain management per primary  - IV antibiotics  - No need for acute  surgical intervention. Will re-evaluate symptoms tomorrow.  - Patient is scheduled in October for definite ureteroscopy of L stone and stricture and R stent exchange.   Possible UTI (urinary tract infection)  Possible UTI  UA positive in setting of ureteral stents  Treat empirically, evaluate for symptom improvement        Subjective:   HPI: Danielle is a 58-year-old with history of rectal cancer, recurrent nephrolithiasis has had multiple ureteroscopic interventions 6 in the past year.  She has bilateral ureteral stricture disease maintained with bilateral ureteral stents.  Right proximal ureteral stricture is recalcitrant to endoscopic  "management, would require reconstructive procedure on the right side.  She has a left distal ureteral stricture planning left ureteroscopy with treatment of ureteral stricture and stone disease.  She has planned procedure for left ureteroscopy for treatment of stone and stricture and with exchange of right ureteral stent scheduled for 10/18/2024 with Dr. Stephens.    She presented to the ED due to vaginal pain and dysuria.  Patient reports pains is intermittent, worse with urination. Stabbing like vaginal/urethral discomfort. No abdominal, suprapubic or flank pain. Patient follows with palliative care for cancer related pain.  Patient takes oxybutynin for bladder spasms.  Her UA is positive however in the setting of chronic ureteral stones.  She had no leukocytosis.  Remains afebrile.  He was started on ceftriaxone for presumed UTI.  Urology was consulted for further recommendations.     Review of Systems   Constitutional:  Negative for chills and fever.   Respiratory:  Negative for cough and shortness of breath.    Cardiovascular:  Negative for chest pain and palpitations.   Gastrointestinal:  Negative for abdominal pain and vomiting.   Genitourinary:  Positive for dysuria. Negative for hematuria.   Musculoskeletal:  Negative for arthralgias and back pain.   Skin:  Negative for color change and rash.   Neurological:  Negative for seizures and syncope.   All other systems reviewed and are negative.      Objective:  Vitals: Blood pressure (!) 105/42, pulse 75, temperature (!) 97.4 °F (36.3 °C), resp. rate 16, height 5' 1\" (1.549 m), weight 52.7 kg (116 lb 2.9 oz), SpO2 98%.,Body mass index is 21.95 kg/m².    Physical Exam  Vitals reviewed.   Constitutional:       General: She is not in acute distress.     Appearance: Normal appearance. She is normal weight. She is not ill-appearing, toxic-appearing or diaphoretic.   HENT:      Head: Normocephalic and atraumatic.      Right Ear: External ear normal.      Left Ear: " External ear normal.      Nose: Nose normal.      Mouth/Throat:      Mouth: Mucous membranes are moist.   Eyes:      General: No scleral icterus.     Conjunctiva/sclera: Conjunctivae normal.   Cardiovascular:      Rate and Rhythm: Normal rate.      Pulses: Normal pulses.   Pulmonary:      Effort: Pulmonary effort is normal.   Abdominal:      General: There is no distension.      Tenderness: There is no abdominal tenderness. There is no right CVA tenderness, left CVA tenderness or guarding.   Musculoskeletal:      Cervical back: Normal range of motion.   Neurological:      General: No focal deficit present.      Mental Status: She is alert and oriented to person, place, and time. Mental status is at baseline.   Psychiatric:         Mood and Affect: Mood normal.         Behavior: Behavior normal.         Thought Content: Thought content normal.         Judgment: Judgment normal.       Labs:  Recent Labs     09/23/24 2310   WBC 8.25       Recent Labs     09/23/24 2310   HGB 10.3*     Recent Labs     09/23/24 2310   HCT 32.0*     Recent Labs     09/23/24 2310   CREATININE 1.06         History:    Past Medical History:   Diagnosis Date    Bleeding from colostomy stoma (HCC)      states ileostomy , not colostomy    Cancer (HCC)     Colon cancer (HCC)     Elevated serum creatinine 09/11/2024    Fall     Headache     Hemochromatosis, unspecified     History of transfusion     2022 - no adverse reaction    Hypertension     Hypokalemia 09/12/2024    Kidney calculi     Kidney stone     Liver disease     hemangioma    Muscle weakness     Personal history of COVID-19 12/2022    Sarcoidosis     Seizures (HCC)     2022    Shingles      Social History     Socioeconomic History    Marital status:      Spouse name: None    Number of children: None    Years of education: None    Highest education level: None   Occupational History    None   Tobacco Use    Smoking status: Never     Passive exposure: Past    Smokeless  tobacco: Never   Vaping Use    Vaping status: Never Used   Substance and Sexual Activity    Alcohol use: Never    Drug use: Never    Sexual activity: Not Currently   Other Topics Concern    None   Social History Narrative    From 4/14/23  note:    Emergency Contact: MATEO TREVIZO (ex-) [phone] (Mobile)    Marital Status:     Caregiver/Support: ex- -Mateo and two dtrs.     Children: two dtrs- nataliia 21 in [place] and doretha 18 [facility]    Child/Elder care: no     Housing: two story house    Home Setup: one level    Lives With:  -mateo and two dtrs    Daily Living Activities: independent    Durable Medical Equipment: No    Ambulation: independent    Employment: disabled-SSI-$100 and pension-$700     Status/Location: no     Ability to pay bills: yes. No barriers to paying monthly bills.      POA/LW/AD: no.  - Mateo is healthcare representative.  MSW emailed advance directive.          Social Determinants of Health     Financial Resource Strain: Low Risk  (11/30/2022)    Received from [employer], ulike.    Financial Resource Strain     In the last 12 months, did you worry that your food could run out before you got money to buy more? : No   Food Insecurity: No Food Insecurity (9/24/2024)    Hunger Vital Sign     Worried About Running Out of Food in the Last Year: Never true     Ran Out of Food in the Last Year: Never true   Transportation Needs: No Transportation Needs (9/24/2024)    PRAPARE - Transportation     Lack of Transportation (Medical): No     Lack of Transportation (Non-Medical): No   Physical Activity: Not on file   Stress: Not on file   Social Connections: Not on file   Intimate Partner Violence: Low Risk  (11/30/2022)    Received from [employer], [employer].    Violence     Does anyone in your life hurt you, threaten you, frighten you or make you feel unsafe?: No    Housing Stability: Unknown (9/24/2024)    Housing Stability Vital Sign     Unable to Pay for Housing in the Last Year: No     Number of Times Moved in the Last Year: Not on file     Homeless in the Last Year: No     Past Surgical History:   Procedure Laterality Date    ABSCESS DRAINAGE      left breast    BREAST BIOPSY      CHEST TUBE INSERTION      COLON SURGERY      colostomy    COLONOSCOPY      FL GUIDED CENTRAL VENOUS ACCESS DEVICE INSERTION  03/21/2023    FL RETROGRADE PYELOGRAM  01/23/2023    FL RETROGRADE PYELOGRAM  04/03/2023    FL RETROGRADE PYELOGRAM  7/21/2023    FL RETROGRADE PYELOGRAM  10/11/2023    FL RETROGRADE PYELOGRAM  12/15/2023    FL RETROGRADE PYELOGRAM  2/8/2024    FL RETROGRADE PYELOGRAM  5/10/2024    IR BIOPSY LIVER MASS  05/09/2023    LUNG BIOPSY      AR CYSTO BLADDER W/URETERAL CATHETERIZATION Bilateral 07/21/2023    Procedure: CYSTOSCOPY URETEROSCOPY RETROGRADE PYELOGRAM WITH BILATERAL STENT URETERAL EXCHANGE; LEFT LASER LITHOTRIPSY AND STONE BASKET RETRIEVAL;  Surgeon: José Luis Stephens MD;  [facility];  Service: Urology    AR CYSTO BLADDER W/URETERAL CATHETERIZATION Bilateral 12/15/2023    Procedure: CYSTOSCOPY, BILATERAL  RETROGRADE PYELOGRAM , STENT EXCHANGE RIGHT , LEFT URETEROSCOPY ,  HOLMIUM LASER WITH INSERTION LEFT URETERAL STENT;  Surgeon: Derick Bhakta MD;  [facility];  Service: Urology    AR CYSTO BLADDER W/URETERAL CATHETERIZATION Left 2/8/2024    Procedure: CYSTOSCOPY B/L RETROGRADE PYELOGRAM WITH B/L T URETERALSTENT EXCHANGE,LEFT URETEROSCOPY, DILATION LEFT URETERAL STICTURE;  Surgeon: José Luis Stephens MD;  [facility];  Service: Urology    AR CYSTO BLADDER W/URETERAL CATHETERIZATION Bilateral 5/10/2024    Procedure: CYSTOSCOPY RETROGRADE PYELOGRAM WITH INSERTION STENT URETERAL;  Surgeon: José Luis Stephens MD;  [facility];  Service: Urology    AR CYSTO W/INSERT URETERAL STENT Bilateral 5/10/2024     Procedure: EXCHANGE STENT URETERAL;  Surgeon: José Luis Stephens MD;  [facility];  Service: Urology    PA CYSTO/URETERO W/LITHOTRIPSY &INDWELL STENT INSRT Bilateral 01/23/2023    Procedure: CYSTOSCOPY  RIGHT URETEROSCOPY WITH LITHOTRIPSY HOLMIUM LASER, BILATERAL RETROGRADE PYELOGRAM AND BILATERAL  URETERAL STENT INSERTION;  Surgeon: José Luis Stephens MD;  [facility];  Service: Urology    PA CYSTO/URETERO W/LITHOTRIPSY &INDWELL STENT INSRT Right 04/03/2023    Procedure: RIGHT URETEROSCOPY WITH LITHOTRIPSY HOLMIUM LASER, RETROGRADE PYELOGRAM; BILATERAL EXCHANGE STENT URETERAL;  Surgeon: José Luis Stephens MD;  [facility];  Service: Urology    PA CYSTO/URETERO W/LITHOTRIPSY &INDWELL STENT INSRT Bilateral 10/11/2023    Procedure: CYSTOSCOPY URETEROSCOPY RETROGRADE PYELOGRAM AND INSERTION STENT URETERAL DIAGNOSTINC RIGHT URETEROSCOPY, REMOVAL STENT LEFT SIDE;  Surgeon: José Luis Stephens MD;  [facility];  Service: Urology    PA INSJ TUNNELED CTR VAD W/SUBQ PORT AGE 5 YR/> N/A 03/21/2023    Procedure: INSERTION VENOUS PORT ( PORT-A-CATH) IR;  Surgeon: Mark Amos DO;  [facility];  Service: Interventional Radiology    PA LAPS COLECTOMY PRTL W/COLOPXTSTMY LW ANAST N/A 8/17/2023    Procedure: RESECTION COLON LOW ANTERIOR LAPAROSCOPIC WITH ROBOTICS;  Surgeon: Sree Umanzor MD;  [facility];  Service: Colorectal    TONSILLECTOMY      URINARY SURGERY Bilateral     bilateral stents     Family History   Problem Relation Age of Onset    Cancer Mother     Cirrhosis Father     Breast cancer Neg Hx     Breast cancer additional onset Neg Hx        Brittany Miles PA-C  Date: 9/24/2024 Time: 12:31 PM

## 2024-09-24 NOTE — ASSESSMENT & PLAN NOTE
Possible UTI  UA positive in setting of ureteral stents  Treat empirically, evaluate for symptom improvement

## 2024-09-24 NOTE — H&P
H&P - Hospitalist   Name: Danielle Haque 58 y.o. female I MRN: [id number]  Unit/Bed#: S -01 I Date of Admission: 9/23/2024   Date of Service: 9/24/2024 I Hospital Day: 0     Assessment & Plan  Bilateral hydronephrosis  CT: Bilateral hydronephrosis, right greater than left, worse compared with June 24, 2024. Areas of urothelial enhancement, as described above, concerning for possible superimposed pyelonephritis. Bilateral ureteral stents are in place. A focal area of concretion/encrustation adjacent to the proximal right right ureteral stent appears similar to June 24, 2024. There is a 5 mm calculus in the mid to distal left ureter adjacent to the left ureteral stent; this appears new compared to the prior study. There is bilateral nephrolithiasis.  Follows with urology and initially was planned for treatment of left ureteral stricture on September 13 however this was delayed given recent admission and was rescheduled for October 18.  During prior urology visits it was felt that the right side would need reconstruction given it is heavily encrusted and not amenable to laparoscopic interventions  ED discussed with urology who felt the patient could be discharged home but given inability to control pain she was prompted for admission  Will consult urology for assistance in management while admitted. Will allow diet as given complex  history and history of bilateral hydro do not feel she will need intervention today.   UA abnormal and concerning for potential UTI - will initiate IV ABX.  Possible UTI (urinary tract infection)  Abnormal UA and area of possible pyelo on CT  Could be related to chronic colonization and chronic changes from complex urological history  S/P IV ceftriaxone in the ED - will continue   Prior urine cultures have shown mixed contaminants only  No SIRS criteria  Abnormal CT scan, pelvis  Noted to have fluid in the proximal vagina on CAT scan.  Recommend follow up with GYN   Rectal cancer  "(HCC)  5/25/23 completed a course of neoadjuvant chemoRT with concurrent 5-FU  Follows with rad onc and heme onc as an outpatient  Initial treatment done in [facility] and then transferred care to [facility]  Cancer related pain  Patient follows with palliative care but her pelvic pain is not felt to be cancer related  Takes oxycodone PRN at home  Hypertension  Maintained on Norvasc 10 mg daily at baseline which will be continued  -150s  Sarcoidosis  History of biopsy proven sarcoidosis in the lung and liver.    Follows with GI/Dr. Machuca in the outpatient setting.    Currently on ursodiol 900 mg daily and was resumed on steroids during most recent admission at 40 mg daily x 5 days and then tapering by 10 mg every week  CKD stage G3a/A2, GFR 45-59 and albumin creatinine ratio  mg/g ([facility])  Lab Results   Component Value Date    EGFR 58 09/23/2024    EGFR 53 09/13/2024    EGFR 53 09/12/2024    CREATININE 1.06 09/23/2024    CREATININE 1.15 09/13/2024    CREATININE 1.14 09/12/2024     Creatinine stable and at baseline    VTE Pharmacologic Prophylaxis: VTE Score: 4 Moderate Risk (Score 3-4) - Pharmacological DVT Prophylaxis Ordered: heparin.  Code Status: full code  Discussion with family: will call in Am    Anticipated Length of Stay: Patient will be admitted on an observation basis with an anticipated length of stay of less than 2 midnights secondary to bilateral hydro.    History of Present Illness   Chief Complaint: \"pain in my privates\"    Danielle Haque is a 58 y.o. female with a PMH of rectal cancer status post diverting loop ileostomy in August 2023 complicated by ureteral strictures, sarcoidosis, hypertension, kidney disease who presents with vaginal pain and dysuria.  The patient was noted to be recently admitted from September 10 through September 13 for hypercalcemia secondary to sarcoidosis.  Patient was discharged home on steroids with outpatient rheumatology follow-up.    The patient is currently scheduled to " undergo cystoscopy and treatment of ureteral stricture on October 18, 2024 as this was scheduled on 9/13 but canceled given her admission at that time.  The patient follows with urology as an outpatient.  She has had multiple endoscopic procedures including 6 ureteroscopy and stent exchanges since January 2023 as noted to be maintained with bilateral ureteral stents.    Patient follows with palliative care as an outpatient for cancer related pain. Her chronic pelvic pain is not felt to be related to her cancer.     The patient was taking oxybutynin around-the-clock but was continuing to have pain which she describes as being located in her private parts.  She informs me that it is worse with urinating.  The oxycodone at home was not helping her.  The morphine in the emergency department was noted to improve the pain however it then recurred. Her urine looks orange at baseline    Review of Systems   Constitutional:  Negative for chills and fever.   HENT:  Negative for ear pain and sore throat.    Eyes:  Negative for pain and visual disturbance.   Respiratory:  Negative for cough and shortness of breath.    Cardiovascular:  Negative for chest pain and palpitations.   Gastrointestinal:  Negative for abdominal pain and vomiting.   Genitourinary:  Positive for dysuria, pelvic pain and vaginal pain. Negative for hematuria.   Musculoskeletal:  Negative for arthralgias and back pain.   Skin:  Negative for color change and rash.   Neurological:  Negative for seizures and syncope.   All other systems reviewed and are negative.      I have reviewed the patient's PMH, PSH, Social History, Family History, Meds, and Allergies  Historical Information   Past Medical History:   Diagnosis Date    Bleeding from colostomy stoma (HCC)      states ileostomy , not colostomy    Cancer (HCC)     Colon cancer (HCC)     Elevated serum creatinine 09/11/2024    Fall     Headache     Hemochromatosis, unspecified     History of transfusion      2022 - no adverse reaction    Hypertension     Hypokalemia 09/12/2024    Kidney calculi     Kidney stone     Liver disease     hemangioma    Muscle weakness     Personal history of COVID-19 12/2022    Sarcoidosis     Seizures (HCC)     2022    Shingles      Past Surgical History:   Procedure Laterality Date    ABSCESS DRAINAGE      left breast    BREAST BIOPSY      CHEST TUBE INSERTION      COLON SURGERY      colostomy    COLONOSCOPY      FL GUIDED CENTRAL VENOUS ACCESS DEVICE INSERTION  03/21/2023    FL RETROGRADE PYELOGRAM  01/23/2023    FL RETROGRADE PYELOGRAM  04/03/2023    FL RETROGRADE PYELOGRAM  7/21/2023    FL RETROGRADE PYELOGRAM  10/11/2023    FL RETROGRADE PYELOGRAM  12/15/2023    FL RETROGRADE PYELOGRAM  2/8/2024    FL RETROGRADE PYELOGRAM  5/10/2024    IR BIOPSY LIVER MASS  05/09/2023    LUNG BIOPSY      VT CYSTO BLADDER W/URETERAL CATHETERIZATION Bilateral 07/21/2023    Procedure: CYSTOSCOPY URETEROSCOPY RETROGRADE PYELOGRAM WITH BILATERAL STENT URETERAL EXCHANGE; LEFT LASER LITHOTRIPSY AND STONE BASKET RETRIEVAL;  Surgeon: José Luis Stephens MD;  [facility];  Service: Urology    VT CYSTO BLADDER W/URETERAL CATHETERIZATION Bilateral 12/15/2023    Procedure: CYSTOSCOPY, BILATERAL  RETROGRADE PYELOGRAM , STENT EXCHANGE RIGHT , LEFT URETEROSCOPY ,  HOLMIUM LASER WITH INSERTION LEFT URETERAL STENT;  Surgeon: Derick Bhakta MD;  [facility];  Service: Urology    VT CYSTO BLADDER W/URETERAL CATHETERIZATION Left 2/8/2024    Procedure: CYSTOSCOPY B/L RETROGRADE PYELOGRAM WITH B/L T URETERALSTENT EXCHANGE,LEFT URETEROSCOPY, DILATION LEFT URETERAL STICTURE;  Surgeon: José Luis Stephens MD;  [facility];  Service: Urology    VT CYSTO BLADDER W/URETERAL CATHETERIZATION Bilateral 5/10/2024    Procedure: CYSTOSCOPY RETROGRADE PYELOGRAM WITH INSERTION STENT URETERAL;  Surgeon: José Luis Stephens MD;  [facility];  Service: Urology    VT CYSTO W/INSERT  URETERAL STENT Bilateral 5/10/2024    Procedure: EXCHANGE STENT URETERAL;  Surgeon: José Luis Stephens MD;  [facility];  Service: Urology    CT CYSTO/URETERO W/LITHOTRIPSY &INDWELL STENT INSRT Bilateral 01/23/2023    Procedure: CYSTOSCOPY  RIGHT URETEROSCOPY WITH LITHOTRIPSY HOLMIUM LASER, BILATERAL RETROGRADE PYELOGRAM AND BILATERAL  URETERAL STENT INSERTION;  Surgeon: José Luis Stephens MD;  [facility];  Service: Urology    CT CYSTO/URETERO W/LITHOTRIPSY &INDWELL STENT INSRT Right 04/03/2023    Procedure: RIGHT URETEROSCOPY WITH LITHOTRIPSY HOLMIUM LASER, RETROGRADE PYELOGRAM; BILATERAL EXCHANGE STENT URETERAL;  Surgeon: José Luis Stephens MD;  [facility];  Service: Urology    CT CYSTO/URETERO W/LITHOTRIPSY &INDWELL STENT INSRT Bilateral 10/11/2023    Procedure: CYSTOSCOPY URETEROSCOPY RETROGRADE PYELOGRAM AND INSERTION STENT URETERAL DIAGNOSTINC RIGHT URETEROSCOPY, REMOVAL STENT LEFT SIDE;  Surgeon: José Luis Stephens MD;  [facility];  Service: Urology    CT INSJ TUNNELED CTR VAD W/SUBQ PORT AGE 5 YR/> N/A 03/21/2023    Procedure: INSERTION VENOUS PORT ( PORT-A-CATH) IR;  Surgeon: Mark Amos DO;  [facility];  Service: Interventional Radiology    CT LAPS COLECTOMY PRTL W/COLOPXTSTMY LW ANAST N/A 8/17/2023    Procedure: RESECTION COLON LOW ANTERIOR LAPAROSCOPIC WITH ROBOTICS;  Surgeon: Sree Umanzor MD;  [facility];  Service: Colorectal    TONSILLECTOMY      URINARY SURGERY Bilateral     bilateral stents     Social History     Tobacco Use    Smoking status: Never     Passive exposure: Past    Smokeless tobacco: Never   Vaping Use    Vaping status: Never Used   Substance and Sexual Activity    Alcohol use: Never    Drug use: Never    Sexual activity: Not Currently     E-Cigarette/Vaping    E-Cigarette Use Never User      E-Cigarette/Vaping Substances    Nicotine No     THC No     CBD No     Flavoring No      Family History  "  Problem Relation Age of Onset    Cancer Mother     Cirrhosis Father     Breast cancer Neg Hx     Breast cancer additional onset Neg Hx      Social History     Tobacco Use    Smoking status: Never     Passive exposure: Past    Smokeless tobacco: Never   Vaping Use    Vaping status: Never Used   Substance and Sexual Activity    Alcohol use: Never    Drug use: Never    Sexual activity: Not Currently     Social History:  Marital Status:    Occupation:   Patient Pre-hospital Living Situation: Home  Patient Pre-hospital Level of Mobility: walks  Patient Pre-hospital Diet Restrictions: none    Objective     Vitals:   Blood Pressure: 113/84 (09/24/24 0400)  Pulse: 76 (09/24/24 0400)  Temperature: 98.2 °F (36.8 °C) (09/24/24 0400)  Temp Source: Oral (09/24/24 0400)  Respirations: 16 (09/24/24 0400)  Height: 5' 1\" (154.9 cm) (09/24/24 0400)  Weight - Scale: 52.7 kg (116 lb 2.9 oz) (09/24/24 0400)  SpO2: 98 % (09/24/24 0400)    Physical Exam  Vitals and nursing note reviewed.   Constitutional:       General: She is not in acute distress.     Appearance: Normal appearance. She is not ill-appearing.   HENT:      Head: Normocephalic and atraumatic.   Cardiovascular:      Rate and Rhythm: Normal rate and regular rhythm.   Pulmonary:      Effort: Pulmonary effort is normal.      Breath sounds: Normal breath sounds. No wheezing, rhonchi or rales.   Abdominal:      General: Abdomen is flat. Bowel sounds are normal. There is no distension.      Tenderness: There is no abdominal tenderness.      Comments: Colostomy bag with formed stool   Skin:     General: Skin is warm and dry.   Neurological:      Mental Status: She is alert.   Psychiatric:         Mood and Affect: Mood normal.      Comments: Anxious, tearful         Lines/Drains:      Additional Data:   Lab Results: I have reviewed the following results:   Results from last 7 days   Lab Units 09/23/24  2310   WBC Thousand/uL 8.25   HEMOGLOBIN g/dL 10.3*   HEMATOCRIT % " 32.0*   PLATELETS Thousands/uL 259   LYMPHO PCT % 6*   MONO PCT % 2*   EOS PCT % 1     Results from last 7 days   Lab Units 09/23/24  2310   SODIUM mmol/L 134*   POTASSIUM mmol/L 4.9   CHLORIDE mmol/L 105   CO2 mmol/L 25   BUN mg/dL 38*   CREATININE mg/dL 1.06   ANION GAP mmol/L 4   CALCIUM mg/dL 9.5   ALBUMIN g/dL 3.9   TOTAL BILIRUBIN mg/dL 0.45   ALK PHOS U/L 74   ALT U/L 16   AST U/L 15   GLUCOSE RANDOM mg/dL 134             Lab Results   Component Value Date    HGBA1C 5.2 09/10/2024    HGBA1C 6.3 (H) 06/30/2023    HGBA1C 6.0 (H) 03/13/2023           Imaging Review: Reviewed radiology reports from this admission including: CT abdomen/pelvis.  Other Studies: prior records and imaging from earlier admissions reviewed    Administrative Statements       ** Please Note: This note has been constructed using a voice recognition system. **

## 2024-09-24 NOTE — ASSESSMENT & PLAN NOTE
Lab Results   Component Value Date    EGFR 68 09/25/2024    EGFR 58 09/23/2024    EGFR 53 09/13/2024    CREATININE 0.94 09/25/2024    CREATININE 1.06 09/23/2024    CREATININE 1.15 09/13/2024     Creatinine stable and at baseline

## 2024-09-24 NOTE — ED ATTENDING ATTESTATION
9/23/2024  I, Rajiv Flores MD, saw and evaluated the patient. I have discussed the patient with the resident/non-physician practitioner and agree with the resident's/non-physician practitioner's findings, Plan of Care, and MDM as documented in the resident's/non-physician practitioner's note, except where noted. All available labs and Radiology studies were reviewed.  I was present for key portions of any procedure(s) performed by the resident/non-physician practitioner and I was immediately available to provide assistance.       At this point I agree with the current assessment done in the Emergency Department.  I have conducted an independent evaluation of this patient a history and physical is as follows: Patient is a 58 year old female with months of worsening perineal pain with dysuria. No rash. No N/V/D. No abdominal pain. No fever. Was last seen at [facility] in [place] on 9/19/24 for colon cancer. [employer] website checked on this patient and last Rx filled was on 9/19/24 for oxycodone for 20 day supply. Has had colon surgery. Has had prior kidney stones. NCAT. Mask in place. No scleral icterus. Lungs clear. Bradycardia without murmur. Abdomen soft and nontender. (+) bowel sounds. No edema. No rash noted.  exam declined by patient as per ED resident. DDx including but not limited to: appendicitis, gastroenteritis, gastritis, PUD, GERD, gastroparesis, hepatitis, pancreatitis, colitis, enteritis, diverticulitis, food poisoning, mesenteric adenitis, epiploic appendagitis, mesenteric panniculitis, mesenteric ischemia, IBD, IBS, ileus, bowel obstruction, volvulus, internal hernia, AAA, cholecystitis, biliary colic, choledocholithiasis, perforated viscus, tumor, splenic etiology, constipation, pelvic pathology, renal colic, pyelonephritis, UTI, urethral stricture; doubt cardiac etiology. Will check labs and CT and give morphine IV for pain which patient stated to ED resident that she can take  this.     ED Course         Critical Care Time  Procedures

## 2024-09-24 NOTE — ASSESSMENT & PLAN NOTE
Abnormal UA and area of possible pyelo on CT  Could be related to chronic colonization and chronic changes from complex urological history  S/P IV ceftriaxone in the ED - will continue   Prior urine cultures have shown mixed contaminants only  No SIRS criteria

## 2024-09-24 NOTE — ASSESSMENT & PLAN NOTE
- Patient with bilateral ureteral strictures managed with bilateral ureteral stents  - CT showing stents in good position, focal area of encrustation around proximal right ureteral stent similar to prior study. With 5 mm calculus in mid to distal left ureter. Bilateral hydronephrosis right greater than L, some urothelial enhancement concerning for pyelonephritis  - Afebrile. No leukocytosis  - UA positive however in setting of indwelling ureteral stents.   - Reports new hematuria this afternoon-  due to ureteral stents. Severe vaginal/ urethral pain not accompanied by abdominal or flank pain.  - Cr at baseline  - Planned Left ureteroscopy, holmium laser lithotripsy for treatment of stones and stricture with exchange of R ureteral stent by Dr. Stephens    Plan:  - Pain management per primary  - IV antibiotics  - No need for acute  surgical intervention. Will re-evaluate symptoms tomorrow.  - Patient is scheduled in October for definite ureteroscopy of L stone and stricture and R stent exchange.

## 2024-09-24 NOTE — ASSESSMENT & PLAN NOTE
5/25/23 completed a course of neoadjuvant chemoRT with concurrent 5-FU  Follows with rad onc and heme onc as an outpatient  Initial treatment done in [facility] and then transferred care to [facility]

## 2024-09-24 NOTE — PLAN OF CARE
Problem: PAIN - ADULT  Goal: Verbalizes/displays adequate comfort level or baseline comfort level  Description: Interventions:  - Encourage patient to monitor pain and request assistance  - Assess pain using appropriate pain scale  - Administer analgesics based on type and severity of pain and evaluate response  - Implement non-pharmacological measures as appropriate and evaluate response  - Consider cultural and social influences on pain and pain management  - Notify physician/advanced practitioner if interventions unsuccessful or patient reports new pain  Outcome: Progressing     Problem: INFECTION - ADULT  Goal: Absence or prevention of progression during hospitalization  Description: INTERVENTIONS:  - Assess and monitor for signs and symptoms of infection  - Monitor lab/diagnostic results  - Monitor all insertion sites, i.e. indwelling lines, tubes, and drains  - Monitor endotracheal if appropriate and nasal secretions for changes in amount and color  - [place] appropriate cooling/warming therapies per order  - Administer medications as ordered  - Instruct and encourage patient and family to use good hand hygiene technique  - Identify and instruct in appropriate isolation precautions for identified infection/condition  Outcome: Progressing  Goal: Absence of fever/infection during neutropenic period  Description: INTERVENTIONS:  - Monitor WBC    Outcome: Progressing     Problem: SAFETY ADULT  Goal: Patient will remain free of falls  Description: INTERVENTIONS:  - Educate patient/family on patient safety including physical limitations  - Instruct patient to call for assistance with activity   - Consult OT/PT to assist with strengthening/mobility   - Keep Call bell within reach  - Keep bed low and locked with side rails adjusted as appropriate  - Keep care items and personal belongings within reach  - Initiate and maintain comfort rounds  - Make Fall Risk Sign visible to staff  - Offer Toileting every 2 Hours,  in advance of need  - Initiate/Maintain bed alarm    - Apply yellow socks and bracelet for high fall risk patients  - Consider moving patient to room near nurses station  Outcome: Progressing  Goal: Maintain or return to baseline ADL function  Description: INTERVENTIONS:  -  Assess patient's ability to carry out ADLs; assess patient's baseline for ADL function and identify physical deficits which impact ability to perform ADLs (bathing, care of mouth/teeth, toileting, grooming, dressing, etc.)  - Assess/evaluate cause of self-care deficits   - Assess range of motion  - Assess patient's mobility; develop plan if impaired  - Assess patient's need for assistive devices and provide as appropriate  - Encourage maximum independence but intervene and supervise when necessary  - Involve family in performance of ADLs  - Assess for home care needs following discharge   - Consider OT consult to assist with ADL evaluation and planning for discharge  - Provide patient education as appropriate  Outcome: Progressing  Goal: Maintains/Returns to pre admission functional level  Description: INTERVENTIONS:  - Perform AM-PAC 6 Click Basic Mobility/ Daily Activity assessment daily.  - Set and communicate daily mobility goal to care team and patient/family/caregiver.   - Collaborate with rehabilitation services on mobility goals if consulted  - Perform Range of Motion 3 times a day.  - Reposition patient every 2 hours.  - Dangle patient 3 times a day  - Stand patient 3 times a day  - Ambulate patient 3 times a day  - Out of bed to chair 3 times a day   - Out of bed for meals 3 times a day  - Out of bed for toileting  - Record patient progress and toleration of activity level   Outcome: Progressing     Problem: DISCHARGE PLANNING  Goal: Discharge to home or other facility with appropriate resources  Description: INTERVENTIONS:  - Identify barriers to discharge w/patient and caregiver  - Arrange for needed discharge resources and  transportation as appropriate  - Identify discharge learning needs (meds, wound care, etc.)  - Arrange for interpretive services to assist at discharge as needed  - Refer to Case Management Department for coordinating discharge planning if the patient needs post-hospital services based on physician/advanced practitioner order or complex needs related to functional status, cognitive ability, or social support system  Outcome: Progressing     Problem: Knowledge Deficit  Goal: Patient/family/caregiver demonstrates understanding of disease process, treatment plan, medications, and discharge instructions  Description: Complete learning assessment and assess knowledge base.  Interventions:  - Provide teaching at level of understanding  - Provide teaching via preferred learning methods  Outcome: Progressing

## 2024-09-25 LAB
ANION GAP SERPL CALCULATED.3IONS-SCNC: 5 MMOL/L (ref 4–13)
BACTERIA UR CULT: ABNORMAL
BACTERIA UR CULT: ABNORMAL
BASOPHILS # BLD AUTO: 0 THOUSANDS/ΜL (ref 0–0.1)
BASOPHILS NFR BLD AUTO: 0 % (ref 0–1)
BUN SERPL-MCNC: 32 MG/DL (ref 5–25)
CALCIUM SERPL-MCNC: 9.4 MG/DL (ref 8.4–10.2)
CHLORIDE SERPL-SCNC: 107 MMOL/L (ref 96–108)
CO2 SERPL-SCNC: 26 MMOL/L (ref 21–32)
CREAT SERPL-MCNC: 0.94 MG/DL (ref 0.6–1.3)
EOSINOPHIL # BLD AUTO: 0.08 THOUSAND/ΜL (ref 0–0.61)
EOSINOPHIL NFR BLD AUTO: 1 % (ref 0–6)
ERYTHROCYTE [DISTWIDTH] IN BLOOD BY AUTOMATED COUNT: 13.3 % (ref 11.6–15.1)
GFR SERPL CREATININE-BSD FRML MDRD: 68 ML/MIN/1.73SQ M
GLUCOSE P FAST SERPL-MCNC: 84 MG/DL (ref 65–99)
GLUCOSE SERPL-MCNC: 84 MG/DL (ref 65–140)
HCT VFR BLD AUTO: 32.5 % (ref 34.8–46.1)
HGB BLD-MCNC: 10.5 G/DL (ref 11.5–15.4)
IMM GRANULOCYTES # BLD AUTO: 0.05 THOUSAND/UL (ref 0–0.2)
IMM GRANULOCYTES NFR BLD AUTO: 1 % (ref 0–2)
LYMPHOCYTES # BLD AUTO: 0.45 THOUSANDS/ΜL (ref 0.6–4.47)
LYMPHOCYTES NFR BLD AUTO: 8 % (ref 14–44)
MCH RBC QN AUTO: 28.5 PG (ref 26.8–34.3)
MCHC RBC AUTO-ENTMCNC: 32.3 G/DL (ref 31.4–37.4)
MCV RBC AUTO: 88 FL (ref 82–98)
MONOCYTES # BLD AUTO: 0.33 THOUSAND/ΜL (ref 0.17–1.22)
MONOCYTES NFR BLD AUTO: 6 % (ref 4–12)
NEUTROPHILS # BLD AUTO: 5.05 THOUSANDS/ΜL (ref 1.85–7.62)
NEUTS SEG NFR BLD AUTO: 84 % (ref 43–75)
NRBC BLD AUTO-RTO: 0 /100 WBCS
PLATELET # BLD AUTO: 246 THOUSANDS/UL (ref 149–390)
PMV BLD AUTO: 9 FL (ref 8.9–12.7)
POTASSIUM SERPL-SCNC: 3.7 MMOL/L (ref 3.5–5.3)
RBC # BLD AUTO: 3.69 MILLION/UL (ref 3.81–5.12)
SODIUM SERPL-SCNC: 138 MMOL/L (ref 135–147)
WBC # BLD AUTO: 5.96 THOUSAND/UL (ref 4.31–10.16)

## 2024-09-25 PROCEDURE — 85025 COMPLETE CBC W/AUTO DIFF WBC: CPT | Performed by: INTERNAL MEDICINE

## 2024-09-25 PROCEDURE — 80048 BASIC METABOLIC PNL TOTAL CA: CPT | Performed by: INTERNAL MEDICINE

## 2024-09-25 PROCEDURE — 99232 SBSQ HOSP IP/OBS MODERATE 35: CPT | Performed by: UROLOGY

## 2024-09-25 PROCEDURE — 99233 SBSQ HOSP IP/OBS HIGH 50: CPT

## 2024-09-25 RX ADMIN — MORPHINE SULFATE 2 MG: 2 INJECTION, SOLUTION INTRAMUSCULAR; INTRAVENOUS at 18:50

## 2024-09-25 RX ADMIN — HEPARIN SODIUM 5000 UNITS: 5000 INJECTION INTRAVENOUS; SUBCUTANEOUS at 22:15

## 2024-09-25 RX ADMIN — CONJUGATED ESTROGENS 0.3 G: 0.62 CREAM VAGINAL at 07:59

## 2024-09-25 RX ADMIN — AMLODIPINE BESYLATE 5 MG: 5 TABLET ORAL at 07:57

## 2024-09-25 RX ADMIN — MORPHINE SULFATE 2 MG: 2 INJECTION, SOLUTION INTRAMUSCULAR; INTRAVENOUS at 13:41

## 2024-09-25 RX ADMIN — TAMSULOSIN HYDROCHLORIDE 0.4 MG: 0.4 CAPSULE ORAL at 16:34

## 2024-09-25 RX ADMIN — MORPHINE SULFATE 2 MG: 2 INJECTION, SOLUTION INTRAMUSCULAR; INTRAVENOUS at 03:33

## 2024-09-25 RX ADMIN — OXYCODONE HYDROCHLORIDE 5 MG: 5 TABLET ORAL at 02:11

## 2024-09-25 RX ADMIN — HEPARIN SODIUM 5000 UNITS: 5000 INJECTION INTRAVENOUS; SUBCUTANEOUS at 05:25

## 2024-09-25 RX ADMIN — OXYCODONE HYDROCHLORIDE 5 MG: 5 TABLET ORAL at 22:19

## 2024-09-25 RX ADMIN — PREDNISONE 20 MG: 20 TABLET ORAL at 07:56

## 2024-09-25 RX ADMIN — FERROUS SULFATE TAB 325 MG (65 MG ELEMENTAL FE) 325 MG: 325 (65 FE) TAB at 07:56

## 2024-09-25 RX ADMIN — MORPHINE SULFATE 2 MG: 2 INJECTION, SOLUTION INTRAMUSCULAR; INTRAVENOUS at 07:51

## 2024-09-25 RX ADMIN — OXYCODONE HYDROCHLORIDE 5 MG: 5 TABLET ORAL at 11:46

## 2024-09-25 RX ADMIN — FENOFIBRATE 145 MG: 145 TABLET ORAL at 07:56

## 2024-09-25 RX ADMIN — HEPARIN SODIUM 5000 UNITS: 5000 INJECTION INTRAVENOUS; SUBCUTANEOUS at 13:41

## 2024-09-25 RX ADMIN — URSODIOL 900 MG: 300 CAPSULE ORAL at 07:58

## 2024-09-25 RX ADMIN — CYANOCOBALAMIN TAB 500 MCG 1000 MCG: 500 TAB at 07:56

## 2024-09-25 NOTE — ASSESSMENT & PLAN NOTE
CT: Bilateral hydronephrosis, right greater than left, worse compared with June 24, 2024. Areas of urothelial enhancement, as described above, concerning for possible superimposed pyelonephritis. Bilateral ureteral stents are in place. A focal area of concretion/encrustation adjacent to the proximal right right ureteral stent appears similar to June 24, 2024. There is a 5 mm calculus in the mid to distal left ureter adjacent to the left ureteral stent; this appears new compared to the prior study. There is bilateral nephrolithiasis.  Follows with urology, Dr. Stephens   initially was planned for treatment of left ureteral stricture on September 13 however this was delayed given recent admission and was rescheduled for October 18.  During prior urology visits it was felt that the right side would need reconstruction given it is heavily encrusted and not amenable to laparoscopic interventions  ED discussed with urology who felt the patient could be discharged home but given inability to control pain she was prompted for admission  UA abnormal, urine cultures positive for lactobacillus species, given this is normal micro biome yet patient has constant dysuria and hematuria will continue IV ceftriaxone, initiated on 9/24  Urology consulted:   Given patients persistent dysuria, hematuria, and generalized peritoneal pain may benefit from inpatient intervention. Plans to discuss with primary urologist

## 2024-09-25 NOTE — PLAN OF CARE
Problem: PAIN - ADULT  Goal: Verbalizes/displays adequate comfort level or baseline comfort level  Description: Interventions:  - Encourage patient to monitor pain and request assistance  - Assess pain using appropriate pain scale  - Administer analgesics based on type and severity of pain and evaluate response  - Implement non-pharmacological measures as appropriate and evaluate response  - Consider cultural and social influences on pain and pain management  - Notify physician/advanced practitioner if interventions unsuccessful or patient reports new pain  Outcome: Progressing     Problem: INFECTION - ADULT  Goal: Absence or prevention of progression during hospitalization  Description: INTERVENTIONS:  - Assess and monitor for signs and symptoms of infection  - Monitor lab/diagnostic results  - Monitor all insertion sites, i.e. indwelling lines, tubes, and drains  - Monitor endotracheal if appropriate and nasal secretions for changes in amount and color  - Flushing appropriate cooling/warming therapies per order  - Administer medications as ordered  - Instruct and encourage patient and family to use good hand hygiene technique  - Identify and instruct in appropriate isolation precautions for identified infection/condition  Outcome: Progressing  Goal: Absence of fever/infection during neutropenic period  Description: INTERVENTIONS:  - Monitor WBC    Outcome: Progressing     Problem: SAFETY ADULT  Goal: Patient will remain free of falls  Description: INTERVENTIONS:  - Educate patient/family on patient safety including physical limitations  - Instruct patient to call for assistance with activity   - Consult OT/PT to assist with strengthening/mobility   - Keep Call bell within reach  - Keep bed low and locked with side rails adjusted as appropriate  - Keep care items and personal belongings within reach  - Initiate and maintain comfort rounds  - Make Fall Risk Sign visible to staff  - Offer Toileting every 2 Hours,  in advance of need  - Initiate/Maintain bed alarm    - Apply yellow socks and bracelet for high fall risk patients  - Consider moving patient to room near nurses station  Outcome: Progressing  Goal: Maintain or return to baseline ADL function  Description: INTERVENTIONS:  -  Assess patient's ability to carry out ADLs; assess patient's baseline for ADL function and identify physical deficits which impact ability to perform ADLs (bathing, care of mouth/teeth, toileting, grooming, dressing, etc.)  - Assess/evaluate cause of self-care deficits   - Assess range of motion  - Assess patient's mobility; develop plan if impaired  - Assess patient's need for assistive devices and provide as appropriate  - Encourage maximum independence but intervene and supervise when necessary  - Involve family in performance of ADLs  - Assess for home care needs following discharge   - Consider OT consult to assist with ADL evaluation and planning for discharge  - Provide patient education as appropriate  Outcome: Progressing  Goal: Maintains/Returns to pre admission functional level  Description: INTERVENTIONS:  - Perform AM-PAC 6 Click Basic Mobility/ Daily Activity assessment daily.  - Set and communicate daily mobility goal to care team and patient/family/caregiver.   - Collaborate with rehabilitation services on mobility goals if consulted  - Perform Range of Motion 3 times a day.  - Reposition patient every 2 hours.  - Dangle patient 3 times a day  - Stand patient 3 times a day  - Ambulate patient 3 times a day  - Out of bed to chair 3 times a day   - Out of bed for meals 3 times a day  - Out of bed for toileting  - Record patient progress and toleration of activity level   Outcome: Progressing     Problem: DISCHARGE PLANNING  Goal: Discharge to home or other facility with appropriate resources  Description: INTERVENTIONS:  - Identify barriers to discharge w/patient and caregiver  - Arrange for needed discharge resources and  transportation as appropriate  - Identify discharge learning needs (meds, wound care, etc.)  - Arrange for interpretive services to assist at discharge as needed  - Refer to Case Management Department for coordinating discharge planning if the patient needs post-hospital services based on physician/advanced practitioner order or complex needs related to functional status, cognitive ability, or social support system  Outcome: Progressing     Problem: Knowledge Deficit  Goal: Patient/family/caregiver demonstrates understanding of disease process, treatment plan, medications, and discharge instructions  Description: Complete learning assessment and assess knowledge base.  Interventions:  - Provide teaching at level of understanding  - Provide teaching via preferred learning methods  Outcome: Progressing

## 2024-09-25 NOTE — PROGRESS NOTES
Progress Note - Urology   Name: Danielle Haque 58 y.o. female I MRN: [id number]  Unit/Bed#: S -01 I Date of Admission: 9/23/2024   Date of Service: 9/25/2024 I Hospital Day: 0     Assessment & Plan  Bilateral hydronephrosis  - Patient with bilateral ureteral strictures managed with bilateral ureteral stents  - CT showing stents in good position, focal area of encrustation around proximal right ureteral stent similar to prior study. With 5 mm calculus in mid to distal left ureter. Bilateral hydronephrosis right greater than L, some urothelial enhancement concerning for pyelonephritis  - Afebrile. No leukocytosis  - Urine cx lactobacillus- normal vaginal wilfred  - Reports Severe vaginal/ urethral pain not accompanied by abdominal or flank pain.  Requiring IV morphine  - Cr at baseline    Plan:  - Pain management per primary  - Unclear if procedure stent exchange and ureteroscopy will alleviate symptoms. Patients reports procedure/stent exchange has helped this symptoms in the past  - Added on for tomorrow  - NPO at midnight     Subjective:   HPI: Seen at bedside with significant other.  She reports no improvement in pain.  Still sharp stabbing pain intermittently, worse with urination.  No abdominal or flank pain.  Discussed unclear if procedure would help with her symptoms.  She reports in the past when she has the symptoms undergoing a stent exchange has helped.  Bladder scan 13 mL.    Review of Systems   Constitutional:  Negative for chills and fever.   Respiratory:  Negative for cough and shortness of breath.    Cardiovascular:  Negative for chest pain and palpitations.   Gastrointestinal:  Negative for abdominal pain and vomiting.   Genitourinary:  Positive for dysuria. Negative for hematuria.   Musculoskeletal:  Negative for arthralgias and back pain.   Skin:  Negative for color change and rash.   Neurological:  Negative for seizures and syncope.   All other systems reviewed and are  "negative.      Objective:    Vitals: Blood pressure 105/54, pulse 64, temperature 99.2 °F (37.3 °C), temperature source Oral, resp. rate 18, height 5' 1\" (1.549 m), weight 52.7 kg (116 lb 2.9 oz), SpO2 99%.,Body mass index is 21.95 kg/m².    Physical Exam  Vitals reviewed.   Constitutional:       General: She is not in acute distress.     Appearance: Normal appearance. She is normal weight. She is not ill-appearing, toxic-appearing or diaphoretic.   HENT:      Head: Normocephalic and atraumatic.      Right Ear: External ear normal.      Left Ear: External ear normal.      Nose: Nose normal.      Mouth/Throat:      Mouth: Mucous membranes are moist.   Eyes:      General: No scleral icterus.     Conjunctiva/sclera: Conjunctivae normal.   Cardiovascular:      Rate and Rhythm: Normal rate.      Pulses: Normal pulses.   Pulmonary:      Effort: Pulmonary effort is normal.   Abdominal:      General: There is no distension.      Tenderness: There is no abdominal tenderness. There is no guarding.   Musculoskeletal:      Cervical back: Normal range of motion.   Neurological:      General: No focal deficit present.      Mental Status: She is alert and oriented to person, place, and time. Mental status is at baseline.   Psychiatric:         Mood and Affect: Mood normal.         Behavior: Behavior normal.         Thought Content: Thought content normal.         Judgment: Judgment normal.             Labs:  Recent Labs     09/23/24 2310 09/25/24  0528   WBC 8.25 5.96       Recent Labs     09/23/24 2310 09/25/24  0528   HGB 10.3* 10.5*     Recent Labs     09/23/24 2310 09/25/24  0528   HCT 32.0* 32.5*     Recent Labs     09/23/24 2310 09/25/24  0528   CREATININE 1.06 0.94         History:    Past Medical History:   Diagnosis Date    Bleeding from colostomy stoma (HCC)      states ileostomy , not colostomy    Cancer (HCC)     Colon cancer (HCC)     Elevated serum creatinine 09/11/2024    Fall     Headache     " Hemochromatosis, unspecified     History of transfusion     2022 - no adverse reaction    Hypertension     Hypokalemia 09/12/2024    Kidney calculi     Kidney stone     Liver disease     hemangioma    Muscle weakness     Personal history of COVID-19 12/2022    Sarcoidosis     Seizures (HCC)     2022    Shingles      Social History     Socioeconomic History    Marital status:      Spouse name: None    Number of children: None    Years of education: None    Highest education level: None   Occupational History    None   Tobacco Use    Smoking status: Never     Passive exposure: Past    Smokeless tobacco: Never   Vaping Use    Vaping status: Never Used   Substance and Sexual Activity    Alcohol use: Never    Drug use: Never    Sexual activity: Not Currently   Other Topics Concern    None   Social History Narrative    From 4/14/23  note:    Emergency Contact: MATEO TREVIZO (ex-) [phone] (Mobile)    Marital Status:     Caregiver/Support: ex- -Mateo and two dtrs.     Children: two dtrs- nataliia 21 in [place] and doretha 18 [facility]    Child/Elder care: no     Housing: two story house    Home Setup: one level    Lives With:  frankie and two dtrs    Daily Living Activities: independent    Durable Medical Equipment: No    Ambulation: independent    Employment: disabled-SSI-$100 and pension-$700    [employer] Status/Location: no     Ability to pay bills: yes. No barriers to paying monthly bills.      POA/LW/AD: no.  - Mateo is healthcare representative.  MSW emailed advance directive.          Social Determinants of Health     Financial Resource Strain: Low Risk  (11/30/2022)    Received from [facility] [employer] [employer]    Financial Resource Strain     In the last 12 months, did you worry that your food could run out before you got money to buy more? : No   Food Insecurity: No Food Insecurity (9/24/2024)    Hunger Vital Sign     Worried  About Running Out of Food in the Last Year: Never true     Ran Out of Food in the Last Year: Never true   Transportation Needs: No Transportation Needs (9/24/2024)    PRAPARE - Transportation     Lack of Transportation (Medical): No     Lack of Transportation (Non-Medical): No   Physical Activity: Not on file   Stress: Not on file   Social Connections: Not on file   Intimate Partner Violence: Low Risk  (11/30/2022)    Received from [employer]    Violence     Does anyone in your life hurt you, threaten you, frighten you or make you feel unsafe?: No   Housing Stability: Unknown (9/24/2024)    Housing Stability Vital Sign     Unable to Pay for Housing in the Last Year: No     Number of Times Moved in the Last Year: Not on file     Homeless in the Last Year: No     Past Surgical History:   Procedure Laterality Date    ABSCESS DRAINAGE      left breast    BREAST BIOPSY      CHEST TUBE INSERTION      COLON SURGERY      colostomy    COLONOSCOPY      FL GUIDED CENTRAL VENOUS ACCESS DEVICE INSERTION  03/21/2023    FL RETROGRADE PYELOGRAM  01/23/2023    FL RETROGRADE PYELOGRAM  04/03/2023    FL RETROGRADE PYELOGRAM  7/21/2023    FL RETROGRADE PYELOGRAM  10/11/2023    FL RETROGRADE PYELOGRAM  12/15/2023    FL RETROGRADE PYELOGRAM  2/8/2024    FL RETROGRADE PYELOGRAM  5/10/2024    IR BIOPSY LIVER MASS  05/09/2023    LUNG BIOPSY      FL CYSTO BLADDER W/URETERAL CATHETERIZATION Bilateral 07/21/2023    Procedure: CYSTOSCOPY URETEROSCOPY RETROGRADE PYELOGRAM WITH BILATERAL STENT URETERAL EXCHANGE; LEFT LASER LITHOTRIPSY AND STONE BASKET RETRIEVAL;  Surgeon: José Luis Stephens MD;  [facility]: [facility] MAIN OR;  Service: Urology    FL CYSTO BLADDER W/URETERAL CATHETERIZATION Bilateral 12/15/2023    Procedure: CYSTOSCOPY, BILATERAL  RETROGRADE PYELOGRAM , STENT EXCHANGE RIGHT , LEFT URETEROSCOPY ,  HOLMIUM LASER WITH INSERTION LEFT URETERAL STENT;  Surgeon: Derick Bhakta MD;   Location: [facility] MAIN [facility];  Service: Urology    SC CYSTO BLADDER W/URETERAL CATHETERIZATION Left 2/8/2024    Procedure: CYSTOSCOPY B/L RETROGRADE PYELOGRAM WITH B/L T URETERALSTENT EXCHANGE,LEFT URETEROSCOPY, DILATION LEFT URETERAL STICTURE;  Surgeon: José Luis Stephens MD;  Location[facility] Main OR;  Service: Urology    SC CYSTO BLADDER W/URETERAL CATHETERIZATION Bilateral 5/10/2024    Procedure: CYSTOSCOPY RETROGRADE PYELOGRAM WITH INSERTION STENT URETERAL;  Surgeon: José Luis Stephens MD;  [facility];  Service: Urology    SC CYSTO W/INSERT URETERAL STENT Bilateral 5/10/2024    Procedure: EXCHANGE STENT URETERAL;  Surgeon: José Luis Stephens MD;  [facility];  Service: Urology    SC CYSTO/URETERO W/LITHOTRIPSY &INDWELL STENT INSRT Bilateral 01/23/2023    Procedure: CYSTOSCOPY  RIGHT URETEROSCOPY WITH LITHOTRIPSY HOLMIUM LASER, BILATERAL RETROGRADE PYELOGRAM AND BILATERAL  URETERAL STENT INSERTION;  Surgeon: José Luis Stephens MD;  Location[facility];  Service: Urology    SC CYSTO/URETERO W/LITHOTRIPSY &INDWELL STENT INSRT Right 04/03/2023    Procedure: RIGHT URETEROSCOPY WITH LITHOTRIPSY HOLMIUM LASER, RETROGRADE PYELOGRAM; BILATERAL EXCHANGE STENT URETERAL;  Surgeon: José Luis Stephens MD;  Location[facility];  Service: Urology    SC CYSTO/URETERO W/LITHOTRIPSY &INDWELL STENT INSRT Bilateral 10/11/2023    Procedure: CYSTOSCOPY URETEROSCOPY RETROGRADE PYELOGRAM AND INSERTION STENT URETERAL DIAGNOSTINC RIGHT URETEROSCOPY, REMOVAL STENT LEFT SIDE;  Surgeon: José Luis Stephens MD;  [facility];  Service: Urology    SC INSJ TUNNELED CTR VAD W/SUBQ PORT AGE 5 YR/> N/A 03/21/2023    Procedure: INSERTION VENOUS PORT ( PORT-A-CATH) IR;  Surgeon: Mark Amos DO;  [facility];  Service: Interventional Radiology    SC LAPS COLECTOMY PRTL W/COLOPXTSTMY LW ANAST N/A 8/17/2023    Procedure: RESECTION COLON LOW ANTERIOR LAPAROSCOPIC WITH ROBOTICS;  Surgeon: Sree  MD Erna;  Location: [facility];  Service: Colorectal    TONSILLECTOMY      URINARY SURGERY Bilateral     bilateral stents     Family History   Problem Relation Age of Onset    Cancer Mother     Cirrhosis Father     Breast cancer Neg Hx     Breast cancer additional onset Neg Hx        Brittany Miles PA-C  Date: 9/25/2024 Time: 3:59 PM

## 2024-09-25 NOTE — UTILIZATION REVIEW
"OBSERVATION 9-24 -24 CHANGED TO INPATIENT 9-25-24 FOR OR STENT EXCHANGE POSSIBLE URETEROSCOPY.    Initial Clinical Review    Admission: Date/Time/Statement:     Admission Orders (From admission, onward)       Ordered        09/25/24 1645  INPATIENT ADMISSION  Once            09/24/24 0306  Place in Observation  Once                             ED Arrival Information       Expected   -    Arrival   9/23/2024 21:28    Acuity   Urgent              Means of arrival   Walk-In    Escorted by   Spouse    Service   Hospitalist    Admission type   Emergency              Arrival complaint   Vaginal Pain             Chief Complaint   Patient presents with    Vaginal Pain     Reports worsening vaginal pain \"for a long time.\" Hx of urethral stents, seen here last week, reports has multiple doctors for her history of kidney stones, has follow up in October for \"stent mitigation.\" Reports prescribed oxycodone for pain is not helping.        Initial Presentation: 58 y.o. female presents to ed from home via ems on 9-23 for evaluation and treatment of  worsening vaginal pain for a long time and dysuria.  PMHX: ureteral stents. Clinical assessment significant for pain 10/10. UA : leukocytes    Imaging shows BL hydronephrosis R>L , BL ureteral stents in place.  Initially treated with iv mso4 x2, iv ceftriaxone. Admit to observation    Consult urology: BL ureteral obstruction, rectal cancer with ileostomy, pelvic and vaginal pain, ureteral stents.  Unclear if hydronephrosis related to stent failure vs occlusion.  Conservative management for now.  Already scheduled for elective ureteroscopy.    Date: 9-25-24    Day 2: observation changed to inpatient med surg Certification Statement: The patient will continue to require additional inpatient hospital stay due to pain management/ urologic evaluation   Urine cultures positive for lactobacillus.  Since pt has constant dysuria and hematuria plan to continue iv ceftriaxone.  Received prn iv " mso4 x 0333 / 0751 / 1341 / 1850.      Date 9-26-24 Certification Statement: The patient will continue to require additional inpatient hospital stay due to stent exchange today   Discharge Plan: Anticipate discharge tomorrow to home.      Patient received iv mso4 at 0540 and 1500. NPO.       SURGERY DATE: 9/26/2024     Procedure(s):  Bilateral - CYSTOSCOPY . BILATERAL  RETROGRADE PYELOGRAM AND BILATERAL URETERAL STENT EXCHANGE  LEFT  URETEROSCOPY.     Anesthesia Type:   General     Operative Indications:  Ureteral stents present [Z96.0]        Operative Findings:  Tight bilateral ureters.  Both stents were mildly difficult to remove due to compression from stricture ureters.  Left ureteroscope would not bypassed the area of stricture and therefore could not further evaluate or treat the stone in the urgent setting.  Bilateral stents replaced, ureteral length measured and sizes adjusted slightly.  May provide less discomfort.  Will refer back to Dr. Stephens for further planning and possible reconstructive consultation.        Complications:   None        ED Triage Vitals [09/23/24 2153]   Temperature Pulse Respirations Blood Pressure SpO2 Pain Score   (!) 97.4 °F (36.3 °C) 76 20 159/73 99 % 10 - Worst Possible Pain     Weight (last 2 days)       Date/Time Weight    09/24/24 04:00:59 52.7 (116.18)    09/23/24 2154 53.4 (117.73)            Vital Signs (last 3 days)       Date/Time Temp Pulse Resp BP MAP (mmHg) SpO2 O2 Device Pain    09/25/24 1341 -- -- -- -- -- -- -- 7    09/25/24 1146 -- -- -- -- -- -- -- 5    09/25/24 0900 -- -- -- -- -- 94 % None (Room air) --    09/25/24 0751 -- -- -- -- -- -- -- 7    09/25/24 07:35:27 -- 65 -- 115/60 -- 99 % -- --    09/25/24 0211 -- -- -- -- -- -- -- 8    09/24/24 2100 98.8 °F (37.1 °C) 62 18 113/66 -- 98 % -- 4    09/24/24 20:56:12 99.2 °F (37.3 °C) 61 -- -- -- 98 % -- --    09/24/24 2001 -- -- -- -- -- -- -- 7    09/24/24 1526 -- -- -- 110/60 -- -- -- --    09/24/24 15:22:03  97.6 °F (36.4 °C) 61 -- -- -- 97 % -- --    09/24/24 1224 -- -- -- -- -- -- -- 8 09/24/24 1219 -- -- -- -- -- -- -- 8 09/24/24 1009 -- -- -- -- -- -- -- 2    09/24/24 0910 -- -- -- -- -- 97 % None (Room air) --    09/24/24 08:13:33 97.4 °F (36.3 °C) 75 -- 105/42 -- 98 % -- --    09/24/24 0613 -- -- -- -- -- -- -- 6 09/24/24 0416 -- -- -- -- -- -- -- 8 09/24/24 04:00:59 98.2 °F (36.8 °C) 76 16 113/84 94 98 % None (Room air) 8 09/24/24 0359 -- -- -- -- -- -- -- 8 09/24/24 0059 -- -- -- -- -- -- -- 5 09/24/24 0007 -- -- -- -- -- -- -- 5 09/23/24 2315 -- 59 -- 134/66 94 98 % -- --    09/23/24 2307 -- -- -- -- -- -- -- 5 09/23/24 2153 97.4 °F (36.3 °C) 76 20 159/73 -- 99 % None (Room air) 10 - Worst Possible Pain           Pertinent Labs/Diagnostic Test Results:   Radiology:  CT abdomen pelvis with contrast   Final(09/24 0159)      Bilateral hydronephrosis, right greater than left, worse compared with June 24, 2024. Areas of urothelial enhancement, as described above, concerning for possible superimposed pyelonephritis. Correlation with urinalysis and urine culture and sensitivity is recommended.         Bilateral ureteral stents are in place. A focal area of concretion/encrustation adjacent to the proximal right right ureteral stent appears similar to June 24, 2024. There is a 5 mm calculus in the mid to distal left ureter adjacent to the left ureteral stent; this appears new compared to the prior study. There is bilateral nephrolithiasis.         There is some fluid in the proximal vagina. Clinical correlation recommended. Consider Gynecology consultation, if clinically appropriate.           Cardiology:  No orders to display     GI:  No orders to display           Results from last 7 days   Lab Units 09/25/24  0528 09/23/24  2310   WBC Thousand/uL 5.96 8.25   HEMOGLOBIN g/dL 10.5* 10.3*   HEMATOCRIT % 32.5* 32.0*   PLATELETS Thousands/uL 246 259   TOTAL NEUT ABS Thousands/µL 5.05  --           Results from last 7 days   Lab Units 09/25/24  0528 09/23/24  2310   SODIUM mmol/L 138 134*   POTASSIUM mmol/L 3.7 4.9   CHLORIDE mmol/L 107 105   CO2 mmol/L 26 25   ANION GAP mmol/L 5 4   BUN mg/dL 32* 38*   CREATININE mg/dL 0.94 1.06   EGFR ml/min/1.73sq m 68 58   CALCIUM mg/dL 9.4 9.5     Results from last 7 days   Lab Units 09/23/24  2310   AST U/L 15   ALT U/L 16   ALK PHOS U/L 74   TOTAL PROTEIN g/dL 6.3*   ALBUMIN g/dL 3.9   TOTAL BILIRUBIN mg/dL 0.45         Results from last 7 days   Lab Units 09/25/24  0528 09/23/24  2310   GLUCOSE RANDOM mg/dL 84 134       Results from last 7 days   Lab Units 09/23/24  2350   CLARITY UA  Turbid   COLOR UA  Colorless   SPEC GRAV UA  1.011   PH UA  6.0   GLUCOSE UA mg/dl Negative   KETONES UA mg/dl Negative   BLOOD UA  Large*   PROTEIN UA mg/dl Trace*   NITRITE UA  Negative   BILIRUBIN UA  Negative   UROBILINOGEN UA (BE) mg/dl <2.0   LEUKOCYTES UA  Large*   WBC UA /hpf Innumerable*   RBC UA /hpf 30-50*   BACTERIA UA /hpf Occasional   EPITHELIAL CELLS WET PREP /hpf Occasional       Results from last 7 days   Lab Units 09/23/24  2350   URINE CULTURE  40,000-49,000 cfu/ml Lactobacillus species*  <10,000 cfu/ml Gram-positive maria l*         ED Treatment-Medication Administration from 09/23/2024 2128 to 09/24/2024 0353         Date/Time Order Dose Route Action     09/23/2024 2307 morphine injection 4 mg 4 mg Intravenous Given     09/24/2024 0056 ceftriaxone (ROCEPHIN) 1 g/50 mL in dextrose IVPB 1,000 mg Intravenous New Bag     09/24/2024 0059 morphine injection 4 mg 4 mg Intravenous Given            Past Medical History:   Diagnosis Date    Bleeding from colostomy stoma (HCC)      states ileostomy , not colostomy    Cancer (HCC)     Colon cancer (HCC)     Elevated serum creatinine 09/11/2024    Fall     Headache     Hemochromatosis, unspecified     History of transfusion     2022 - no adverse reaction    Hypertension     Hypokalemia 09/12/2024    Kidney calculi     Kidney  stone     Liver disease     hemangioma    Muscle weakness     Personal history of COVID-19 12/2022    Sarcoidosis     Seizures (HCC)     2022    Shingles      Present on Admission:   Hypertension   CKD stage G3a/A2, GFR 45-59 and albumin creatinine ratio  mg/g (HCC)   Sarcoidosis   Rectal cancer (HCC)   Cancer related pain      Admitting Diagnosis:     Nephrolithiasis [N20.0]  Pyelonephritis [N12]  Vaginal pain [R10.2]  Ureteral stent present [Z96.0]    Age/Sex: 58 y.o. female    Scheduled Medications:    amLODIPine, 5 mg, Oral, Daily  cefTRIAXone, 1,000 mg, Intravenous, Q24H  cyanocobalamin, 1,000 mcg, Oral, Daily  estrogens, conjugated, 0.3 g, Vaginal, Once per day on Monday Wednesday Friday  fenofibrate, 145 mg, Oral, Daily  ferrous sulfate, 325 mg, Oral, Daily With Breakfast  heparin (porcine), 5,000 Units, Subcutaneous, Q8H STANLEY  predniSONE, 20 mg, Oral, Daily   Followed by  [START ON 10/1/2024] predniSONE, 10 mg, Oral, Daily  tamsulosin, 0.4 mg, Oral, Daily With Dinner  ursodiol, 900 mg, Oral, Daily      Continuous IV Infusions:     PRN Meds:  acetaminophen, 650 mg, Oral, Q6H PRN  lidocaine, 1 Application, Urethral, Daily PRN  morphine injection, 2 mg, Intravenous, Q4H PRN  ondansetron, 4 mg, Intravenous, Q6H PRN  oxybutynin, 5 mg, Oral, Q6H PRN  oxyCODONE, 5 mg, Oral, Q6H PRN        IP CONSULT TO UROLOGY    Network Utilization Review Department  ATTENTION: Please call with any questions or concerns to [phone] and carefully listen to the prompts so that you are directed to the right person. All voicemails are confidential.   For Discharge needs, contact Care Management DC Support Team at [phone] opt. [phone]  Send all requests for admission clinical reviews, approved or denied determinations and any other requests to dedicated fax number below belonging to the campus where the patient is receiving treatment. List of dedicated fax numbers for the Facilities:  FACILITY NAME UR FAX NUMBER   ADMISSION  DENIALS (Administrative/Medical Necessity) [phone]   DISCHARGE SUPPORT TEAM (NETWORK) [phone]   PARENT CHILD HEALTH (Maternity/NICU/Pediatrics) [phone]   [facility] [phone]   General acute hospital [phone]   [facility] [phone]   [facility] [phone]   [facility] [phone]   [facility] [phone]   [facility] [phone]   [facility] [phone]   [facility] [phone]   [facility] [phone]   [facility] [phone]   [facility] [phone]

## 2024-09-25 NOTE — PROGRESS NOTES
Progress Note - Hospitalist   Name: Danielle Haque 58 y.o. female I MRN: [id number]  Unit/Bed#: S -01 I Date of Admission: 9/23/2024   Date of Service: 9/25/2024 I Hospital Day: 0    Assessment & Plan  Bilateral hydronephrosis  CT: Bilateral hydronephrosis, right greater than left, worse compared with June 24, 2024. Areas of urothelial enhancement, as described above, concerning for possible superimposed pyelonephritis. Bilateral ureteral stents are in place. A focal area of concretion/encrustation adjacent to the proximal right right ureteral stent appears similar to June 24, 2024. There is a 5 mm calculus in the mid to distal left ureter adjacent to the left ureteral stent; this appears new compared to the prior study. There is bilateral nephrolithiasis.  Follows with urology, Dr. Stephens   initially was planned for treatment of left ureteral stricture on September 13 however this was delayed given recent admission and was rescheduled for October 18.  During prior urology visits it was felt that the right side would need reconstruction given it is heavily encrusted and not amenable to laparoscopic interventions  ED discussed with urology who felt the patient could be discharged home but given inability to control pain she was prompted for admission  UA abnormal, urine cultures positive for lactobacillus species, given this is normal micro biome yet patient has constant dysuria and hematuria will continue IV ceftriaxone, initiated on 9/24  Urology consulted:   Given patients persistent dysuria, hematuria, and generalized peritoneal pain may benefit from inpatient intervention. Plans to discuss with primary urologist   Possible UTI (urinary tract infection)  Abnormal UA and area of possible pyelo on CT  Could be related to chronic colonization and chronic changes from complex urological history  Prior urine cultures have shown mixed contaminants only  S/P IV ceftriaxone in the ED - will continue   Urine  culture now positive for lactobacillus species  No SIRS criteria  Abnormal CT scan, pelvis  Noted to have fluid in the proximal vagina on CAT scan.  Recommend follow up with GYN   Rectal cancer (HCC)  5/25/23 completed a course of neoadjuvant chemoRT with concurrent 5-FU  Follows with rad onc and heme onc as an outpatient  [facility]  Cancer related pain  Patient follows with palliative care but her pelvic pain is not felt to be cancer related  Takes oxycodone PRN at home  Hypertension  Maintained on Norvasc 10 mg daily at baseline which will be continued  Bp lower this afternoon. Will reduce norvasc to 5mg   -150s  Sarcoidosis  History of biopsy proven sarcoidosis in the lung and liver.    Follows with GI/Dr. Machuca in the outpatient setting.    Currently on ursodiol 900 mg daily and was resumed on steroids during most recent admission at 40 mg daily x 5 days and then tapering by 10 mg every week  CKD stage G3a/A2, GFR 45-59 and albumin creatinine ratio  mg/g ([facility])  Lab Results   Component Value Date    EGFR 68 09/25/2024    EGFR 58 09/23/2024    EGFR 53 09/13/2024    CREATININE 0.94 09/25/2024    CREATININE 1.06 09/23/2024    CREATININE 1.15 09/13/2024     Creatinine stable and at baseline    VTE Pharmacologic Prophylaxis: VTE Score: 4 Moderate Risk (Score 3-4) - Pharmacological DVT Prophylaxis Ordered: heparin.    Mobility:   Basic Mobility Inpatient Raw Score: 24  JH-HLM Goal: 8: Walk 250 feet or more  JH-HLM Achieved: 7: Walk 25 feet or more  JH-HLM Goal NOT achieved. Continue with multidisciplinary rounding and encourage appropriate mobility to improve upon JH-HLM goals.    Patient Centered Rounds: I performed bedside rounds with nursing staff today.   Discussions with Specialists or Other Care Team Provider: urology.     Education and Discussions with Family / Patient: Updated  (friend) via phone.    Current Length of Stay: 0  day(s)  Current Patient Status: Observation   Certification Statement: The patient will continue to require additional inpatient hospital stay due to pain management/ urologic evaluation   Discharge Plan: Anticipate discharge tomorrow to home.    Code Status: Level 1 - Full Code    Subjective   Patient doing well on exam however, she still complains of hematuria and dysuria and generalized vaginal pain.  She denies any flank pain, fever, chills, pelvic pain.    Objective     Vitals:   Temp (24hrs), Av.5 °F (36.9 °C), Min:97.6 °F (36.4 °C), Max:99.2 °F (37.3 °C)    Temp:  [97.6 °F (36.4 °C)-99.2 °F (37.3 °C)] 98.8 °F (37.1 °C)  HR:  [61-65] 65  Resp:  [18] 18  BP: (110-115)/(60-66) 115/60  SpO2:  [94 %-99 %] 94 %  Body mass index is 21.95 kg/m².     Input and Output Summary (last 24 hours):     Intake/Output Summary (Last 24 hours) at 2024 1337  Last data filed at 2024 0900  Gross per 24 hour   Intake 220 ml   Output --   Net 220 ml       Physical Exam  Vitals reviewed.   Constitutional:       Appearance: Normal appearance.   HENT:      Mouth/Throat:      Mouth: Mucous membranes are moist.   Cardiovascular:      Rate and Rhythm: Normal rate and regular rhythm.      Heart sounds: Normal heart sounds.   Pulmonary:      Effort: Pulmonary effort is normal.      Breath sounds: Normal breath sounds.   Abdominal:      General: Bowel sounds are normal.      Palpations: Abdomen is soft.      Tenderness: There is no abdominal tenderness. There is no right CVA tenderness or left CVA tenderness.   Musculoskeletal:      Right lower leg: No edema.      Left lower leg: No edema.   Skin:     General: Skin is warm and dry.   Neurological:      Mental Status: She is alert.          Lines/Drains:  Lines/Drains/Airways       Active Status       Name Placement date Placement time Site Days    Port A Cath 23 Right Internal jugular 23  1022  Internal jugular  554    Ileostomy Loop RLQ 23  1140  RLQ  405     Ureteral Internal Stent Left ureter 6 Fr. 07/21/23  0946  Left ureter  432    Ureteral Internal Stent Right ureter 7 Fr. 07/21/23  1004  Right ureter  432    Ureteral Internal Stent Right ureter 7 Fr. 10/11/23  1505  Right ureter  349    Ureteral Internal Stent Left ureter 6 Fr. 05/10/24  1236  Left ureter  138    Ureteral Internal Stent Right ureter 6 Fr. 05/10/24  1241  Right ureter  138                    Central Line:  Goal for removal: N/A - Chronic PICC               Lab Results: I have reviewed the following results:    Results from last 7 days   Lab Units 09/25/24  0528   WBC Thousand/uL 5.96   HEMOGLOBIN g/dL 10.5*   HEMATOCRIT % 32.5*   PLATELETS Thousands/uL 246   SEGS PCT % 84*   LYMPHO PCT % 8*   MONO PCT % 6   EOS PCT % 1     Results from last 7 days   Lab Units 09/25/24  0528 09/23/24  2310   SODIUM mmol/L 138 134*   POTASSIUM mmol/L 3.7 4.9   CHLORIDE mmol/L 107 105   CO2 mmol/L 26 25   BUN mg/dL 32* 38*   CREATININE mg/dL 0.94 1.06   ANION GAP mmol/L 5 4   CALCIUM mg/dL 9.4 9.5   ALBUMIN g/dL  --  3.9   TOTAL BILIRUBIN mg/dL  --  0.45   ALK PHOS U/L  --  74   ALT U/L  --  16   AST U/L  --  15   GLUCOSE RANDOM mg/dL 84 134                       Recent Cultures (last 7 days):   Results from last 7 days   Lab Units 09/23/24  2350   URINE CULTURE  40,000-49,000 cfu/ml Lactobacillus species*  <10,000 cfu/ml Gram-positive maria l*       Imaging Review: Reviewed radiology reports from this admission including: CT abdomen/pelvis.  Other Studies: No additional pertinent studies reviewed.    Last 24 Hours Medication List:     Current Facility-Administered Medications:     acetaminophen (TYLENOL) tablet 650 mg, Q6H PRN    amLODIPine (NORVASC) tablet 5 mg, Daily    ceftriaxone (ROCEPHIN) 1 g/50 mL in dextrose IVPB, Q24H, Last Rate: 1,000 mg (09/24/24 2340)    cyanocobalamin (VITAMIN B-12) tablet 1,000 mcg, Daily    estrogens, conjugated (Premarin) vaginal cream 0.3 g, Once per day on Monday Wednesday Friday     fenofibrate (TRICOR) tablet 145 mg, Daily    ferrous sulfate tablet 325 mg, Daily With Breakfast    heparin (porcine) subcutaneous injection 5,000 Units, Q8H STANLEY **AND** Platelet count, Once    lidocaine (URO-JET) 2 % urethral/mucosal gel 1 Application, Daily PRN    morphine injection 2 mg, Q4H PRN    ondansetron (ZOFRAN) injection 4 mg, Q6H PRN    oxybutynin (DITROPAN) tablet 5 mg, Q6H PRN    oxyCODONE (ROXICODONE) IR tablet 5 mg, Q6H PRN    predniSONE tablet 20 mg, Daily **FOLLOWED BY** [START ON 10/1/2024] predniSONE tablet 10 mg, Daily    tamsulosin (FLOMAX) capsule 0.4 mg, Daily With Dinner    ursodiol (ACTIGALL) capsule 900 mg, Daily    Administrative Statements   Today, Patient Was Seen By: Jill Prado PA-C      **Please Note: This note may have been constructed using a voice recognition system.**

## 2024-09-25 NOTE — ASSESSMENT & PLAN NOTE
Abnormal UA and area of possible pyelo on CT  Could be related to chronic colonization and chronic changes from complex urological history  Prior urine cultures have shown mixed contaminants only  S/P IV ceftriaxone in the ED - will continue   Urine culture now positive for lactobacillus species  No SIRS criteria

## 2024-09-25 NOTE — ASSESSMENT & PLAN NOTE
- Patient with bilateral ureteral strictures managed with bilateral ureteral stents  - CT showing stents in good position, focal area of encrustation around proximal right ureteral stent similar to prior study. With 5 mm calculus in mid to distal left ureter. Bilateral hydronephrosis right greater than L, some urothelial enhancement concerning for pyelonephritis  - Afebrile. No leukocytosis  - Urine cx lactobacillus- normal vaginal wilfred  - Reports Severe vaginal/ urethral pain not accompanied by abdominal or flank pain.  Requiring IV morphine  - Cr at baseline    Plan:  - Pain management per primary  - Unclear if procedure stent exchange and ureteroscopy will alleviate symptoms. Patients reports procedure/stent exchange has helped this symptoms in the past  - Added on for tomorrow  - NPO at midnight

## 2024-09-26 ENCOUNTER — ANESTHESIA EVENT (INPATIENT)
Dept: PERIOP | Facility: HOSPITAL | Age: 59
DRG: 463 | End: 2024-09-26
Payer: COMMERCIAL

## 2024-09-26 ENCOUNTER — APPOINTMENT (INPATIENT)
Dept: RADIOLOGY | Facility: HOSPITAL | Age: 59
DRG: 463 | End: 2024-09-26
Payer: COMMERCIAL

## 2024-09-26 ENCOUNTER — ANESTHESIA (INPATIENT)
Dept: PERIOP | Facility: HOSPITAL | Age: 59
DRG: 463 | End: 2024-09-26
Payer: COMMERCIAL

## 2024-09-26 LAB
ANION GAP SERPL CALCULATED.3IONS-SCNC: 4 MMOL/L (ref 4–13)
BUN SERPL-MCNC: 34 MG/DL (ref 5–25)
CALCIUM SERPL-MCNC: 9.2 MG/DL (ref 8.4–10.2)
CHLORIDE SERPL-SCNC: 108 MMOL/L (ref 96–108)
CO2 SERPL-SCNC: 27 MMOL/L (ref 21–32)
CREAT SERPL-MCNC: 0.88 MG/DL (ref 0.6–1.3)
ERYTHROCYTE [DISTWIDTH] IN BLOOD BY AUTOMATED COUNT: 13.4 % (ref 11.6–15.1)
GFR SERPL CREATININE-BSD FRML MDRD: 73 ML/MIN/1.73SQ M
GLUCOSE SERPL-MCNC: 83 MG/DL (ref 65–140)
HCT VFR BLD AUTO: 30.8 % (ref 34.8–46.1)
HGB BLD-MCNC: 9.9 G/DL (ref 11.5–15.4)
MCH RBC QN AUTO: 28.7 PG (ref 26.8–34.3)
MCHC RBC AUTO-ENTMCNC: 32.1 G/DL (ref 31.4–37.4)
MCV RBC AUTO: 89 FL (ref 82–98)
PLATELET # BLD AUTO: 177 THOUSANDS/UL (ref 149–390)
PMV BLD AUTO: 9 FL (ref 8.9–12.7)
POTASSIUM SERPL-SCNC: 3.8 MMOL/L (ref 3.5–5.3)
RBC # BLD AUTO: 3.45 MILLION/UL (ref 3.81–5.12)
SODIUM SERPL-SCNC: 139 MMOL/L (ref 135–147)
WBC # BLD AUTO: 4.23 THOUSAND/UL (ref 4.31–10.16)

## 2024-09-26 PROCEDURE — C1758 CATHETER, URETERAL: HCPCS | Performed by: UROLOGY

## 2024-09-26 PROCEDURE — 52351 CYSTOURETERO & OR PYELOSCOPE: CPT | Performed by: UROLOGY

## 2024-09-26 PROCEDURE — C2625 STENT, NON-COR, TEM W/DEL SY: HCPCS | Performed by: UROLOGY

## 2024-09-26 PROCEDURE — 99232 SBSQ HOSP IP/OBS MODERATE 35: CPT | Performed by: PHYSICIAN ASSISTANT

## 2024-09-26 PROCEDURE — 0TP98DZ REMOVAL OF INTRALUMINAL DEVICE FROM URETER, VIA NATURAL OR ARTIFICIAL OPENING ENDOSCOPIC: ICD-10-PCS | Performed by: UROLOGY

## 2024-09-26 PROCEDURE — BT141ZZ FLUOROSCOPY OF KIDNEYS, URETERS AND BLADDER USING LOW OSMOLAR CONTRAST: ICD-10-PCS | Performed by: RADIOLOGY

## 2024-09-26 PROCEDURE — 52332 CYSTOSCOPY AND TREATMENT: CPT | Performed by: UROLOGY

## 2024-09-26 PROCEDURE — 80048 BASIC METABOLIC PNL TOTAL CA: CPT

## 2024-09-26 PROCEDURE — 74420 UROGRAPHY RTRGR +-KUB: CPT

## 2024-09-26 PROCEDURE — 0T788DZ DILATION OF BILATERAL URETERS WITH INTRALUMINAL DEVICE, VIA NATURAL OR ARTIFICIAL OPENING ENDOSCOPIC: ICD-10-PCS | Performed by: UROLOGY

## 2024-09-26 PROCEDURE — C1769 GUIDE WIRE: HCPCS | Performed by: UROLOGY

## 2024-09-26 PROCEDURE — 85027 COMPLETE CBC AUTOMATED: CPT

## 2024-09-26 RX ORDER — CEFAZOLIN SODIUM 1 G/50ML
1000 SOLUTION INTRAVENOUS ONCE
Status: COMPLETED | OUTPATIENT
Start: 2024-09-26 | End: 2024-09-26

## 2024-09-26 RX ORDER — ONDANSETRON 2 MG/ML
INJECTION INTRAMUSCULAR; INTRAVENOUS AS NEEDED
Status: DISCONTINUED | OUTPATIENT
Start: 2024-09-26 | End: 2024-09-26

## 2024-09-26 RX ORDER — SODIUM CHLORIDE, SODIUM LACTATE, POTASSIUM CHLORIDE, CALCIUM CHLORIDE 600; 310; 30; 20 MG/100ML; MG/100ML; MG/100ML; MG/100ML
INJECTION, SOLUTION INTRAVENOUS CONTINUOUS PRN
Status: DISCONTINUED | OUTPATIENT
Start: 2024-09-26 | End: 2024-09-26

## 2024-09-26 RX ORDER — METOCLOPRAMIDE HYDROCHLORIDE 5 MG/ML
10 INJECTION INTRAMUSCULAR; INTRAVENOUS ONCE AS NEEDED
Status: DISCONTINUED | OUTPATIENT
Start: 2024-09-26 | End: 2024-09-26 | Stop reason: HOSPADM

## 2024-09-26 RX ORDER — DEXAMETHASONE SODIUM PHOSPHATE 10 MG/ML
INJECTION, SOLUTION INTRAMUSCULAR; INTRAVENOUS AS NEEDED
Status: DISCONTINUED | OUTPATIENT
Start: 2024-09-26 | End: 2024-09-26

## 2024-09-26 RX ORDER — MIDAZOLAM HYDROCHLORIDE 2 MG/2ML
INJECTION, SOLUTION INTRAMUSCULAR; INTRAVENOUS AS NEEDED
Status: DISCONTINUED | OUTPATIENT
Start: 2024-09-26 | End: 2024-09-26

## 2024-09-26 RX ORDER — FENTANYL CITRATE 50 UG/ML
INJECTION, SOLUTION INTRAMUSCULAR; INTRAVENOUS AS NEEDED
Status: DISCONTINUED | OUTPATIENT
Start: 2024-09-26 | End: 2024-09-26

## 2024-09-26 RX ORDER — MAGNESIUM HYDROXIDE 1200 MG/15ML
LIQUID ORAL AS NEEDED
Status: DISCONTINUED | OUTPATIENT
Start: 2024-09-26 | End: 2024-09-26 | Stop reason: HOSPADM

## 2024-09-26 RX ORDER — HYDROMORPHONE HCL/PF 1 MG/ML
0.5 SYRINGE (ML) INJECTION
Status: DISCONTINUED | OUTPATIENT
Start: 2024-09-26 | End: 2024-09-26 | Stop reason: HOSPADM

## 2024-09-26 RX ORDER — PROPOFOL 10 MG/ML
INJECTION, EMULSION INTRAVENOUS AS NEEDED
Status: DISCONTINUED | OUTPATIENT
Start: 2024-09-26 | End: 2024-09-26

## 2024-09-26 RX ORDER — HYDROMORPHONE HCL/PF 1 MG/ML
0.5 SYRINGE (ML) INJECTION ONCE
Status: COMPLETED | OUTPATIENT
Start: 2024-09-26 | End: 2024-09-26

## 2024-09-26 RX ORDER — ONDANSETRON 2 MG/ML
4 INJECTION INTRAMUSCULAR; INTRAVENOUS ONCE AS NEEDED
Status: DISCONTINUED | OUTPATIENT
Start: 2024-09-26 | End: 2024-09-26 | Stop reason: HOSPADM

## 2024-09-26 RX ORDER — LIDOCAINE HYDROCHLORIDE 10 MG/ML
INJECTION, SOLUTION EPIDURAL; INFILTRATION; INTRACAUDAL; PERINEURAL AS NEEDED
Status: DISCONTINUED | OUTPATIENT
Start: 2024-09-26 | End: 2024-09-26

## 2024-09-26 RX ADMIN — MORPHINE SULFATE 2 MG: 2 INJECTION, SOLUTION INTRAMUSCULAR; INTRAVENOUS at 15:00

## 2024-09-26 RX ADMIN — PREDNISONE 20 MG: 20 TABLET ORAL at 08:55

## 2024-09-26 RX ADMIN — LIDOCAINE HYDROCHLORIDE 50 MG: 10 INJECTION, SOLUTION EPIDURAL; INFILTRATION; INTRACAUDAL; PERINEURAL at 13:15

## 2024-09-26 RX ADMIN — FENTANYL CITRATE 25 MCG: 50 INJECTION, SOLUTION INTRAMUSCULAR; INTRAVENOUS at 13:30

## 2024-09-26 RX ADMIN — CEFAZOLIN SODIUM 1000 MG: 1 SOLUTION INTRAVENOUS at 11:30

## 2024-09-26 RX ADMIN — CEFTRIAXONE SODIUM 1000 MG: 10 INJECTION, POWDER, FOR SOLUTION INTRAVENOUS at 00:46

## 2024-09-26 RX ADMIN — FENOFIBRATE 145 MG: 145 TABLET ORAL at 08:55

## 2024-09-26 RX ADMIN — DEXAMETHASONE SODIUM PHOSPHATE 10 MG: 10 INJECTION, SOLUTION INTRAMUSCULAR; INTRAVENOUS at 13:18

## 2024-09-26 RX ADMIN — HEPARIN SODIUM 5000 UNITS: 5000 INJECTION INTRAVENOUS; SUBCUTANEOUS at 05:40

## 2024-09-26 RX ADMIN — HYDROMORPHONE HYDROCHLORIDE 0.5 MG: 1 INJECTION, SOLUTION INTRAMUSCULAR; INTRAVENOUS; SUBCUTANEOUS at 21:44

## 2024-09-26 RX ADMIN — ONDANSETRON 4 MG: 2 INJECTION INTRAMUSCULAR; INTRAVENOUS at 13:18

## 2024-09-26 RX ADMIN — OXYCODONE HYDROCHLORIDE 5 MG: 5 TABLET ORAL at 20:26

## 2024-09-26 RX ADMIN — PROPOFOL 40 MG: 10 INJECTION, EMULSION INTRAVENOUS at 13:36

## 2024-09-26 RX ADMIN — SODIUM CHLORIDE, SODIUM LACTATE, POTASSIUM CHLORIDE, CALCIUM CHLORIDE: 600; 310; 30; 20 INJECTION, SOLUTION INTRAVENOUS at 13:07

## 2024-09-26 RX ADMIN — HYDROMORPHONE HYDROCHLORIDE 0.5 MG: 1 INJECTION, SOLUTION INTRAMUSCULAR; INTRAVENOUS; SUBCUTANEOUS at 14:13

## 2024-09-26 RX ADMIN — FENTANYL CITRATE 75 MCG: 50 INJECTION, SOLUTION INTRAMUSCULAR; INTRAVENOUS at 13:36

## 2024-09-26 RX ADMIN — TAMSULOSIN HYDROCHLORIDE 0.4 MG: 0.4 CAPSULE ORAL at 17:56

## 2024-09-26 RX ADMIN — MORPHINE SULFATE 2 MG: 2 INJECTION, SOLUTION INTRAMUSCULAR; INTRAVENOUS at 05:40

## 2024-09-26 RX ADMIN — HYDROMORPHONE HYDROCHLORIDE 0.5 MG: 1 INJECTION, SOLUTION INTRAMUSCULAR; INTRAVENOUS; SUBCUTANEOUS at 14:24

## 2024-09-26 RX ADMIN — URSODIOL 900 MG: 300 CAPSULE ORAL at 08:56

## 2024-09-26 RX ADMIN — MIDAZOLAM HYDROCHLORIDE 2 MG: 2 INJECTION, SOLUTION INTRAMUSCULAR; INTRAVENOUS at 13:07

## 2024-09-26 RX ADMIN — CYANOCOBALAMIN TAB 500 MCG 1000 MCG: 500 TAB at 08:55

## 2024-09-26 RX ADMIN — PROPOFOL 110 MG: 10 INJECTION, EMULSION INTRAVENOUS at 13:15

## 2024-09-26 RX ADMIN — FERROUS SULFATE TAB 325 MG (65 MG ELEMENTAL FE) 325 MG: 325 (65 FE) TAB at 08:58

## 2024-09-26 RX ADMIN — OXYCODONE HYDROCHLORIDE 5 MG: 5 TABLET ORAL at 08:59

## 2024-09-26 RX ADMIN — HEPARIN SODIUM 5000 UNITS: 5000 INJECTION INTRAVENOUS; SUBCUTANEOUS at 22:20

## 2024-09-26 RX ADMIN — AMLODIPINE BESYLATE 5 MG: 5 TABLET ORAL at 08:55

## 2024-09-26 NOTE — ASSESSMENT & PLAN NOTE
5/25/23 completed a course of neoadjuvant ChemoRT with concurrent 5-FU  Follows with rad onc and heme onc as an outpatient  Initial treatment done in [facility] and then transferred care to [facility]

## 2024-09-26 NOTE — CASE MANAGEMENT
Case Management Assessment    Patient name Danielle Haque  Location S /S -01 MRN [id number]  : 1965 Date 2024       Current Admission Date: 2024  Current Admission Diagnosis:Bilateral hydronephrosis   Patient Active Problem List    Diagnosis Date Noted Date Diagnosed    Abnormal CT scan, pelvis 2024     Bilateral hydronephrosis 2024     Possible UTI (urinary tract infection) 2024     Impacted cerumen of right ear 2024     Adrenal gland anomaly 2024     Hypercalcemia 09/10/2024     CKD stage G3a/A2, GFR 45-59 and albumin creatinine ratio  mg/g ([facility]) 2024     Metabolic alkalosis 2024     Chronic kidney disease-mineral bone disorder (CKD-MBD) with stage 3a chronic kidney disease ([facility]) 2024     Nausea 2024     Hypomagnesemia 2024     THRO (acute kidney injury) ([facility]) 2024     Lump of skin 2024     Iron deficiency anemia 2024     Pulmonary nodules 12/15/2023     Ureteral calculi with hydroureteronephrosis 2023     Ureteral stricture 2023     Neuralgia involving scalp 2023     Anxiety about health 2023     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 2023     Scalp pain 2023     Left ear pain 2023     Chronic anticoagulation 2023     Partial small bowel obstruction ([facility]) 2023     Shingles 2023     Preop examination 2023     Rectal cancer ([facility]) 2023     Annual physical exam 2023     Drug-induced constipation 2023     Palliative care patient 2023     Cancer related pain 2023     Port-A-Cath in place 2023     Dysuria 2023     Chest pain 2023     Kidney calculi      Pancytopenia (HCC) 02/15/2023     Nonrheumatic mitral valve regurgitation 02/15/2023     Advance directive discussed with patient 02/15/2023     Skin lesion 02/15/2023     Adrenal nodule (HCC) 2023     Malignant neoplasm of  sigmoid colon (HCC) 12/25/2022     History of Clostridium difficile colitis 12/25/2022     Other hemochromatosis 12/25/2022     Hypertension 12/25/2022     Sarcoidosis 12/25/2022     Impaired fasting glucose 12/25/2022     Transaminitis 12/25/2022     Hydronephrosis due to ureteral stricture 12/25/2022       LOS (days): 1  Geometric Mean LOS (GMLOS) (days): 2.9  Days to GMLOS:1.9     OBJECTIVE:  PATIENT READMITTED TO HOSPITAL  Risk of Unplanned Readmission Score: 22.7         Current admission status: Inpatient       Preferred Pharmacy:   [facility]/pharmacy #[id number] - [place] - [address]  [address] [facility] [address] [place] [address]  Phone: [phone] Fax: [phone]    [facility] - [place] - [address]  [address]  [place] [address]  Phone: [phone] Fax: [phone]    [facility] [place] - [place] - [address]  [address]  [place] [address]  Phone: [phone] Fax: [phone]    [facility]/pharmacy #[id number] - [place] - [address]  [address] [place] [place] [address]  Phone: [phone] Fax: [phone]    Primary Care Provider: Leena Anaya MD    Primary Insurance: [employer]  Secondary Insurance:     ASSESSMENT:  Active Health Care Proxies       [facility] Representative - Relative   Primary Phone: [phone] (Mobile)                                Patient Information  Admitted from:: Home  Mental Status: Alert  During Assessment patient was accompanied by: Not accompanied during assessment  Assessment information provided by:: Patient  Primary Caregiver: Self  Support Systems: Family members  County of Residence: [place]  What city do you live in?: [place]  Home entry access options. Select all that apply.: No steps to enter home  Type of Current Residence: 2 story home  Upon entering residence, is there a bedroom on the main floor (no further steps)?: Yes  Upon entering  residence, is there a bathroom on the main floor (no further steps)?: Yes  Living Arrangements: Other (Comment) (lives w/ ex- and two daughters)    Activities of Daily Living Prior to Admission  Functional Status: Independent  Completes ADLs independently?: Yes  Ambulates independently?: Yes  Does patient use assisted devices?: No  Does patient currently own DME?: Yes  What DME does the patient currently own?: Walker  Does patient have a history of Outpatient Therapy (PT/OT)?: No  Does the patient have a history of Short-Term Rehab?: No  Does patient have a history of HHC?: No  Does patient currently have HHC?: No         Patient Information Continued  Does patient have prescription coverage?: Yes  Does patient receive dialysis treatments?: No         Means of Transportation  Means of Transport to hospitals:: Family transport      Social Determinants of Health (SDOH)      Flowsheet Row Most Recent Value   Housing Stability    In the last 12 months, was there a time when you were not able to pay the mortgage or rent on time? N   At any time in the past 12 months, were you homeless or living in a shelter (including now)? N   Transportation Needs    In the past 12 months, has lack of transportation kept you from medical appointments or from getting medications? no   In the past 12 months, has lack of transportation kept you from meetings, work, or from getting things needed for daily living? No   Food Insecurity    Within the past 12 months, you worried that your food would run out before you got the money to buy more. Never true   Within the past 12 months, the food you bought just didn't last and you didn't have money to get more. Never true   Utilities    In the past 12 months has the electric, gas, oil, or water company threatened to shut off services in your home? No

## 2024-09-26 NOTE — PLAN OF CARE
Problem: PAIN - ADULT  Goal: Verbalizes/displays adequate comfort level or baseline comfort level  Description: Interventions:  - Encourage patient to monitor pain and request assistance  - Assess pain using appropriate pain scale  - Administer analgesics based on type and severity of pain and evaluate response  - Implement non-pharmacological measures as appropriate and evaluate response  - Consider cultural and social influences on pain and pain management  - Notify physician/advanced practitioner if interventions unsuccessful or patient reports new pain  Outcome: Progressing     Problem: INFECTION - ADULT  Goal: Absence or prevention of progression during hospitalization  Description: INTERVENTIONS:  - Assess and monitor for signs and symptoms of infection  - Monitor lab/diagnostic results  - Monitor all insertion sites, i.e. indwelling lines, tubes, and drains  - Monitor endotracheal if appropriate and nasal secretions for changes in amount and color  - [place] appropriate cooling/warming therapies per order  - Administer medications as ordered  - Instruct and encourage patient and family to use good hand hygiene technique  - Identify and instruct in appropriate isolation precautions for identified infection/condition  Outcome: Progressing  Goal: Absence of fever/infection during neutropenic period  Description: INTERVENTIONS:  - Monitor WBC    Outcome: Progressing     Problem: SAFETY ADULT  Goal: Patient will remain free of falls  Description: INTERVENTIONS:  - Educate patient/family on patient safety including physical limitations  - Instruct patient to call for assistance with activity   - Consult OT/PT to assist with strengthening/mobility   - Keep Call bell within reach  - Keep bed low and locked with side rails adjusted as appropriate  - Keep care items and personal belongings within reach  - Initiate and maintain comfort rounds  - Make Fall Risk Sign visible to staff  - Offer Toileting every 2 Hours,  in advance of need   - Apply yellow socks and bracelet for high fall risk patients  - Consider moving patient to room near nurses station  Outcome: Progressing  Goal: Maintain or return to baseline ADL function  Description: INTERVENTIONS:  -  Assess patient's ability to carry out ADLs; assess patient's baseline for ADL function and identify physical deficits which impact ability to perform ADLs (bathing, care of mouth/teeth, toileting, grooming, dressing, etc.)  - Assess/evaluate cause of self-care deficits   - Assess range of motion  - Assess patient's mobility; develop plan if impaired  - Assess patient's need for assistive devices and provide as appropriate  - Encourage maximum independence but intervene and supervise when necessary  - Involve family in performance of ADLs  - Assess for home care needs following discharge   - Consider OT consult to assist with ADL evaluation and planning for discharge  - Provide patient education as appropriate  Outcome: Progressing  Goal: Maintains/Returns to pre admission functional level  Description: INTERVENTIONS:  - Perform AM-PAC 6 Click Basic Mobility/ Daily Activity assessment daily.  - Set and communicate daily mobility goal to care team and patient/family/caregiver.   - Collaborate with rehabilitation services on mobility goals if consulted  -- Out of bed to chair 3 times a day   - Out of bed for meals 3 times a day  - Out of bed for toileting  - Record patient progress and toleration of activity level   Outcome: Progressing     Problem: DISCHARGE PLANNING  Goal: Discharge to home or other facility with appropriate resources  Description: INTERVENTIONS:  - Identify barriers to discharge w/patient and caregiver  - Arrange for needed discharge resources and transportation as appropriate  - Identify discharge learning needs (meds, wound care, etc.)  - Arrange for interpretive services to assist at discharge as needed  - Refer to Case Management Department for  coordinating discharge planning if the patient needs post-hospital services based on physician/advanced practitioner order or complex needs related to functional status, cognitive ability, or social support system  Outcome: Progressing     Problem: Knowledge Deficit  Goal: Patient/family/caregiver demonstrates understanding of disease process, treatment plan, medications, and discharge instructions  Description: Complete learning assessment and assess knowledge base.  Interventions:  - Provide teaching at level of understanding  - Provide teaching via preferred learning methods  Outcome: Progressing

## 2024-09-26 NOTE — ANESTHESIA POSTPROCEDURE EVALUATION
Post-Op Assessment Note    CV Status:  Stable  Pain Score: 0    Pain management: adequate       Mental Status:  Alert and awake   Hydration Status:  Stable   PONV Controlled:  None   Airway Patency:  Patent     Post Op Vitals Reviewed: Yes    No anethesia notable event occurred.    Staff: CRNA               BP   165/77   Temp   97.8   Pulse  74   Resp   15   SpO2   98% room air

## 2024-09-26 NOTE — ASSESSMENT & PLAN NOTE
Patient follows with palliative care but her pelvic pain is not felt to be cancer related  Continue PRN Oxycodone

## 2024-09-26 NOTE — ASSESSMENT & PLAN NOTE
Abnormal UA and area of possible pyelo on CT  Could be related to chronic colonization and chronic changes from complex urological history  Prior urine cultures have shown mixed contaminants only  S/P IV ceftriaxone in the ED - will continue   Urine culture now positive for lactobacillus species

## 2024-09-26 NOTE — OP NOTE
OPERATIVE REPORT  PATIENT NAME: Danielle Haque    :  1965  MRN: [id number]  Pt Location: AN OR ROOM 01    SURGERY DATE: 2024    Surgeons and Role:     * Favian Barber MD - Primary    Preop Diagnosis:  Ureteral stent present [Z96.0]    Post-Op Diagnosis Codes:     * Ureteral stent present [Z96.0]    Procedure(s):  Bilateral - CYSTOSCOPY . BILATERAL  RETROGRADE PYELOGRAM AND BILATERAL URETERAL STENT EXCHANGE  LEFT  URETEROSCOPY.     Specimen(s):  * No specimens in log *    Estimated Blood Loss:   Minimal    Drains:  Ileostomy Loop RLQ (Active)   Stomal Appliance 2 piece 24   Stoma Assessment [place] 24   Stoma Shape Round 24   Peristomal Assessment Clean;Intact 24   Output (mL) 100 mL 24   Number of days: 406       Ureteral Internal Stent Left ureter 6 Fr. (Active)   Number of days: 433       Ureteral Internal Stent Right ureter 7 Fr. (Active)   Number of days: 433       Ureteral Internal Stent Right ureter 7 Fr. (Active)   Number of days: 351       Ureteral Internal Stent Left ureter 6 Fr. (Active)   Number of days: 139       Ureteral Internal Stent Right ureter 6 Fr. (Active)   Number of days: 139       Anesthesia Type:   General    Operative Indications:  Ureteral stents present [Z96.0]      Operative Findings:  Tight bilateral ureters.  Both stents were mildly difficult to remove due to compression from stricture ureters.  Left ureteroscope would not bypassed the area of stricture and therefore could not further evaluate or treat the stone in the urgent setting.  Bilateral stents replaced, ureteral length measured and sizes adjusted slightly.  May provide less discomfort.  Will refer back to Dr. Stephens for further planning and possible reconstructive consultation.      Complications:   None    Procedure and Technique:  Patient was identified, brought to the operating room, and placed on the table in supine position.  After induction of  general anesthesia she was placed in dorsolithotomy position and prepped and draped in the usual sterile fashion.  Formal timeout is performed.    The 22 Mexican rigid scope was placed per urethra to the bladder.  Bilateral stents were noted.  The right stent seemed especially long.  The left stent was grasped and brought out to the meatus.  Guidewire was passed.  I was able to remove the stent with some mild pressure.  There was no stone adherent to the stent but there was clearly some compression.  I passed the semirigid ureteroscope alongside the wire.  I was able to reach the location of the obstruction.  Retrograde pyelogram was performed.  I was not able to pass the scope through this area despite the use of a second guidewire as well.  The stricture was noted to be quite firm.  A 5 Mexican catheter was then passed and ureteral length was measured.  A 6 Mexican by 24 cm double-J stent without string was placed.    The right ureteral stent was then retrieved.  Similarly, was difficult to remove this completely however after placing a guidewire through the stent I was able to remove it intact.  There was not adherent calcification.  5 Mexican catheter was passed and retrograde pyelogram was performed.  Ureteral length was measured.  A 6 Mexican by 22 cm double-J stent without string was then placed on the right side.  Positioning of both stents was confirmed with fluoroscopy and cystoscopy.  Patient tolerated this procedure well.   I was present for the entire procedure.    Patient Disposition:  PACU              SIGNATURE: Favian Barber MD  DATE: September 26, 2024  TIME: 2:02 PM

## 2024-09-26 NOTE — PLAN OF CARE
Problem: PAIN - ADULT  Goal: Verbalizes/displays adequate comfort level or baseline comfort level  Description: Interventions:  - Encourage patient to monitor pain and request assistance  - Assess pain using appropriate pain scale  - Administer analgesics based on type and severity of pain and evaluate response  - Implement non-pharmacological measures as appropriate and evaluate response  - Consider cultural and social influences on pain and pain management  - Notify physician/advanced practitioner if interventions unsuccessful or patient reports new pain  Outcome: Progressing     Problem: INFECTION - ADULT  Goal: Absence or prevention of progression during hospitalization  Description: INTERVENTIONS:  - Assess and monitor for signs and symptoms of infection  - Monitor lab/diagnostic results  - Monitor all insertion sites, i.e. indwelling lines, tubes, and drains  - Monitor endotracheal if appropriate and nasal secretions for changes in amount and color  - [place] appropriate cooling/warming therapies per order  - Administer medications as ordered  - Instruct and encourage patient and family to use good hand hygiene technique  - Identify and instruct in appropriate isolation precautions for identified infection/condition  Outcome: Progressing  Goal: Absence of fever/infection during neutropenic period  Description: INTERVENTIONS:  - Monitor WBC    Outcome: Progressing     Problem: SAFETY ADULT  Goal: Patient will remain free of falls  Description: INTERVENTIONS:  - Educate patient/family on patient safety including physical limitations  - Instruct patient to call for assistance with activity   - Consult OT/PT to assist with strengthening/mobility   - Keep Call bell within reach  - Keep bed low and locked with side rails adjusted as appropriate  - Keep care items and personal belongings within reach  - Initiate and maintain comfort rounds  - Make Fall Risk Sign visible to staff  - Offer Toileting every 2 Hours,  in advance of need  - Initiate/Maintain alarm  - Obtain necessary fall risk management equipment:   - Apply yellow socks and bracelet for high fall risk patients  - Consider moving patient to room near nurses station  Outcome: Progressing  Goal: Maintain or return to baseline ADL function  Description: INTERVENTIONS:  -  Assess patient's ability to carry out ADLs; assess patient's baseline for ADL function and identify physical deficits which impact ability to perform ADLs (bathing, care of mouth/teeth, toileting, grooming, dressing, etc.)  - Assess/evaluate cause of self-care deficits   - Assess range of motion  - Assess patient's mobility; develop plan if impaired  - Assess patient's need for assistive devices and provide as appropriate  - Encourage maximum independence but intervene and supervise when necessary  - Involve family in performance of ADLs  - Assess for home care needs following discharge   - Consider OT consult to assist with ADL evaluation and planning for discharge  - Provide patient education as appropriate  Outcome: Progressing  Goal: Maintains/Returns to pre admission functional level  Description: INTERVENTIONS:  - Perform AM-PAC 6 Click Basic Mobility/ Daily Activity assessment daily.  - Set and communicate daily mobility goal to care team and patient/family/caregiver.   - Collaborate with rehabilitation services on mobility goals if consulted  - Perform Range of Motion 2 times a day.  - Reposition patient every 2 hours.  - Dangle patient 2 times a day  - Stand patient 2 times a day  - Ambulate patient 2 times a day  - Out of bed to chair 2 times a day   - Out of bed for meals 2 times a day  - Out of bed for toileting  - Record patient progress and toleration of activity level   Outcome: Progressing     Problem: DISCHARGE PLANNING  Goal: Discharge to home or other facility with appropriate resources  Description: INTERVENTIONS:  - Identify barriers to discharge w/patient and caregiver  -  Arrange for needed discharge resources and transportation as appropriate  - Identify discharge learning needs (meds, wound care, etc.)  - Arrange for interpretive services to assist at discharge as needed  - Refer to Case Management Department for coordinating discharge planning if the patient needs post-hospital services based on physician/advanced practitioner order or complex needs related to functional status, cognitive ability, or social support system  Outcome: Progressing     Problem: Knowledge Deficit  Goal: Patient/family/caregiver demonstrates understanding of disease process, treatment plan, medications, and discharge instructions  Description: Complete learning assessment and assess knowledge base.  Interventions:  - Provide teaching at level of understanding  - Provide teaching via preferred learning methods  Outcome: Progressing

## 2024-09-26 NOTE — ASSESSMENT & PLAN NOTE
C shows bilateral hydronephrosis, right greater than left, worse compared with June 24, 2024. Areas of urothelial enhancement, as described above, concerning for possible superimposed pyelonephritis. Bilateral ureteral stents are in place. A focal area of concretion/encrustation adjacent to the proximal right right ureteral stent appears similar to June 24, 2024. There is a 5 mm calculus in the mid to distal left ureter adjacent to the left ureteral stent; this appears new compared to the prior study. There is bilateral nephrolithiasis.  Follows with urology, Dr. Stephens  Initially was planned for treatment of left ureteral stricture on September 13 however this was delayed given recent admission and was rescheduled for October 18.  During prior urology visits it was felt that the right side would need reconstruction given it is heavily encrusted and not amenable to laparoscopic interventions  ED discussed with urology who felt the patient could be discharged home but given inability to control pain she was prompted for admission  UA abnormal,  Urine cultures positive for lactobacillus species, given this is normal micro biome yet patient has constant dysuria and hematuria will continue IV ceftriaxone, initiated on 9/24  Urology consulted:   Plan for stent exchange 9/25

## 2024-09-26 NOTE — PROGRESS NOTES
Progress Note - Hospitalist   Name: Danielle Haque 58 y.o. female I MRN: [id number]  Unit/Bed#: S -01 I Date of Admission: 9/23/2024   Date of Service: 9/26/2024 I Hospital Day: 1    Assessment & Plan  Bilateral hydronephrosis  C shows bilateral hydronephrosis, right greater than left, worse compared with June 24, 2024. Areas of urothelial enhancement, as described above, concerning for possible superimposed pyelonephritis. Bilateral ureteral stents are in place. A focal area of concretion/encrustation adjacent to the proximal right right ureteral stent appears similar to June 24, 2024. There is a 5 mm calculus in the mid to distal left ureter adjacent to the left ureteral stent; this appears new compared to the prior study. There is bilateral nephrolithiasis.  Follows with urology, Dr. Stephens  Initially was planned for treatment of left ureteral stricture on September 13 however this was delayed given recent admission and was rescheduled for October 18.  During prior urology visits it was felt that the right side would need reconstruction given it is heavily encrusted and not amenable to laparoscopic interventions  ED discussed with urology who felt the patient could be discharged home but given inability to control pain she was prompted for admission  UA abnormal,  Urine cultures positive for lactobacillus species, given this is normal micro biome yet patient has constant dysuria and hematuria will continue IV ceftriaxone, initiated on 9/24  Urology consulted:   Plan for stent exchange 9/25  Possible UTI (urinary tract infection)  Abnormal UA and area of possible pyelo on CT  Could be related to chronic colonization and chronic changes from complex urological history  Prior urine cultures have shown mixed contaminants only  S/P IV ceftriaxone in the ED - will continue   Urine culture now positive for lactobacillus species    Abnormal CT scan, pelvis  Noted to have fluid in the proximal vagina on CAT  scan.  Recommend follow up with GYN   Rectal cancer (HCC)  5/25/23 completed a course of neoadjuvant ChemoRT with concurrent 5-FU  Follows with rad onc and heme onc as an outpatient  Initial treatment done in [facility] and then transferred care to [facility]  Cancer related pain  Patient follows with palliative care but her pelvic pain is not felt to be cancer related  Continue PRN Oxycodone  Hypertension  BP acceptable   Continue reduced dose Norvasc at 5 mg QD   Sarcoidosis  History of biopsy proven sarcoidosis in the lung and liver.    Follows with GI/Dr. Machuca in the outpatient setting.    Currently on ursodiol 900 mg daily \  Resumed on steroids during most recent admission at 40 mg daily x 5 days and then tapering by 10 mg every week  CKD stage G3a/A2, GFR 45-59 and albumin creatinine ratio  mg/g (HCC)  Lab Results   Component Value Date    EGFR 73 09/26/2024    EGFR 68 09/25/2024    EGFR 58 09/23/2024    CREATININE 0.88 09/26/2024    CREATININE 0.94 09/25/2024    CREATININE 1.06 09/23/2024     Creatinine stable and at baseline  Monitor BMP    VTE Pharmacologic Prophylaxis: VTE Score: 4 Moderate Risk (Score 3-4) - Pharmacological DVT Prophylaxis Ordered: heparin.    Mobility:   Basic Mobility Inpatient Raw Score: 24  JH-HLM Goal: 8: Walk 250 feet or more  JH-HLM Achieved: 7: Walk 25 feet or more  JH-HLM Goal NOT achieved. Continue with multidisciplinary rounding and encourage appropriate mobility to improve upon JH-HLM goals.    Patient Centered Rounds: I performed bedside rounds with nursing staff today.   Discussions with Specialists or Other Care Team Provider: Discussed with RN, CM    Education and Discussions with Family / Patient: Patient declined call to .     Current Length of Stay: 1 day(s)  Current Patient Status: Inpatient   Certification Statement: The patient will continue to require additional inpatient hospital stay due to stent exchange today   Discharge Plan: Anticipate discharge  tomorrow to home.    Code Status: Level 1 - Full Code    Subjective   Patient reports pain is controlled. Hopeful that stent exchange helps her symptoms. Denies other complaints.     Objective     Vitals:   Temp (24hrs), Av °F (37.2 °C), Min:98.7 °F (37.1 °C), Max:99.2 °F (37.3 °C)    Temp:  [98.7 °F (37.1 °C)-99.2 °F (37.3 °C)] 98.7 °F (37.1 °C)  HR:  [59-82] 82  Resp:  [18] 18  BP: (105-130)/(50-60) 130/60  SpO2:  [98 %-99 %] 98 %  Body mass index is 21.95 kg/m².     Input and Output Summary (last 24 hours):     Intake/Output Summary (Last 24 hours) at 2024 0904  Last data filed at 2024 0500  Gross per 24 hour   Intake 420 ml   Output 650 ml   Net -230 ml       Physical Exam  Vitals and nursing note reviewed.   Constitutional:       General: She is sleeping. She is not in acute distress.     Appearance: Normal appearance. She is not ill-appearing.   HENT:      Head: Normocephalic and atraumatic.      Mouth/Throat:      Mouth: Mucous membranes are moist.   Eyes:      General: Lids are normal. No scleral icterus.     Pupils: Pupils are equal, round, and reactive to light.   Cardiovascular:      Rate and Rhythm: Normal rate and regular rhythm.      Heart sounds: Normal heart sounds, S1 normal and S2 normal. No murmur heard.  Pulmonary:      Effort: Pulmonary effort is normal. No accessory muscle usage or respiratory distress.      Breath sounds: Normal breath sounds. No stridor. No decreased breath sounds, wheezing, rhonchi or rales.   Chest:      Chest wall: No tenderness.   Abdominal:      General: Bowel sounds are normal.      Palpations: Abdomen is soft.      Tenderness: There is no abdominal tenderness.   Musculoskeletal:      Right lower leg: No edema.      Left lower leg: No edema.   Skin:     General: Skin is warm and dry.   Neurological:      General: No focal deficit present.      Mental Status: She is easily aroused. Mental status is at baseline.      Motor: No tremor or seizure activity.    Psychiatric:         Behavior: Behavior normal. Behavior is cooperative.          Lines/Drains:  Lines/Drains/Airways       Active Status       Name Placement date Placement time Site Days    Port A Cath 03/21/23 Right Internal jugular 03/21/23  1022  Internal jugular  554    Ileostomy Loop RLQ 08/17/23  1140  RLQ  405    Ureteral Internal Stent Left ureter 6 Fr. 07/21/23  0946  Left ureter  432    Ureteral Internal Stent Right ureter 7 Fr. 07/21/23  1004  Right ureter  432    Ureteral Internal Stent Right ureter 7 Fr. 10/11/23  1505  Right ureter  350    Ureteral Internal Stent Left ureter 6 Fr. 05/10/24  1236  Left ureter  138    Ureteral Internal Stent Right ureter 6 Fr. 05/10/24  1241  Right ureter  138                    Central Line:  Goal for removal: N/A - Chronic PICC               Lab Results: I have reviewed the following results: CBC/BMP:   .     09/26/24  0632   WBC 4.23*   HGB 9.9*   HCT 30.8*      SODIUM 139   K 3.8      CO2 27   BUN 34*   CREATININE 0.88   GLUC 83       Results from last 7 days   Lab Units 09/26/24  0632 09/25/24  0528   WBC Thousand/uL 4.23* 5.96   HEMOGLOBIN g/dL 9.9* 10.5*   HEMATOCRIT % 30.8* 32.5*   PLATELETS Thousands/uL 177 246   SEGS PCT %  --  84*   LYMPHO PCT %  --  8*   MONO PCT %  --  6   EOS PCT %  --  1     Results from last 7 days   Lab Units 09/26/24  0632 09/25/24  0528 09/23/24  2310   SODIUM mmol/L 139   < > 134*   POTASSIUM mmol/L 3.8   < > 4.9   CHLORIDE mmol/L 108   < > 105   CO2 mmol/L 27   < > 25   BUN mg/dL 34*   < > 38*   CREATININE mg/dL 0.88   < > 1.06   ANION GAP mmol/L 4   < > 4   CALCIUM mg/dL 9.2   < > 9.5   ALBUMIN g/dL  --   --  3.9   TOTAL BILIRUBIN mg/dL  --   --  0.45   ALK PHOS U/L  --   --  74   ALT U/L  --   --  16   AST U/L  --   --  15   GLUCOSE RANDOM mg/dL 83   < > 134    < > = values in this interval not displayed.                       Recent Cultures (last 7 days):   Results from last 7 days   Lab Units 09/23/24  9484    URINE CULTURE  40,000-49,000 cfu/ml Lactobacillus species*  <10,000 cfu/ml Gram-positive maria l*       Imaging Review: No pertinent imaging studies reviewed.  Other Studies: No additional pertinent studies reviewed.    Last 24 Hours Medication List:     Current Facility-Administered Medications:     acetaminophen (TYLENOL) tablet 650 mg, Q6H PRN    amLODIPine (NORVASC) tablet 5 mg, Daily    ceftriaxone (ROCEPHIN) 1 g/50 mL in dextrose IVPB, Q24H, Last Rate: 1,000 mg (09/26/24 0046)    cyanocobalamin (VITAMIN B-12) tablet 1,000 mcg, Daily    estrogens, conjugated (Premarin) vaginal cream 0.3 g, Once per day on Monday Wednesday Friday    fenofibrate (TRICOR) tablet 145 mg, Daily    ferrous sulfate tablet 325 mg, Daily With Breakfast    heparin (porcine) subcutaneous injection 5,000 Units, Q8H STANLEY **AND** Platelet count, Once    lidocaine (URO-JET) 2 % urethral/mucosal gel 1 Application, Daily PRN    morphine injection 2 mg, Q4H PRN    ondansetron (ZOFRAN) injection 4 mg, Q6H PRN    oxybutynin (DITROPAN) tablet 5 mg, Q6H PRN    oxyCODONE (ROXICODONE) IR tablet 5 mg, Q6H PRN    predniSONE tablet 20 mg, Daily **FOLLOWED BY** [START ON 10/1/2024] predniSONE tablet 10 mg, Daily    tamsulosin (FLOMAX) capsule 0.4 mg, Daily With Dinner    ursodiol (ACTIGALL) capsule 900 mg, Daily    Administrative Statements   Today, Patient Was Seen By: Frank Toussaint PA-C  I have spent a total time of 35 minutes in caring for this patient on the day of the visit/encounter including Diagnostic results, Impressions, Counseling / Coordination of care, Documenting in the medical record, Reviewing / ordering tests, medicine, procedures  , Obtaining or reviewing history  , and Communicating with other healthcare professionals .    **Please Note: This note may have been constructed using a voice recognition system.**

## 2024-09-26 NOTE — ANESTHESIA PREPROCEDURE EVALUATION
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      Anesthesia Plan  ASA Score- 2 Emergent    Anesthesia Type- general with ASA Monitors.         Additional Monitors:     Airway Plan: LMA.           Plan Factors-    Chart reviewed.                      Induction- intravenous.    Postoperative Plan-     Perioperative Resuscitation Plan - Level 1 - Full Code.       Informed Consent- Anesthetic plan and risks discussed with patient.  I personally reviewed this patient with the CRNA. Discussed and agreed on the Anesthesia Plan with the CRNA..

## 2024-09-26 NOTE — ASSESSMENT & PLAN NOTE
History of biopsy proven sarcoidosis in the lung and liver.    Follows with GI/Dr. Machuca in the outpatient setting.    Currently on ursodiol 900 mg daily \  Resumed on steroids during most recent admission at 40 mg daily x 5 days and then tapering by 10 mg every week

## 2024-09-26 NOTE — ASSESSMENT & PLAN NOTE
Lab Results   Component Value Date    EGFR 73 09/26/2024    EGFR 68 09/25/2024    EGFR 58 09/23/2024    CREATININE 0.88 09/26/2024    CREATININE 0.94 09/25/2024    CREATININE 1.06 09/23/2024     Creatinine stable and at baseline  Monitor BMP

## 2024-09-27 ENCOUNTER — TELEPHONE (OUTPATIENT)
Age: 59
End: 2024-09-27

## 2024-09-27 VITALS
DIASTOLIC BLOOD PRESSURE: 50 MMHG | BODY MASS INDEX: 21.94 KG/M2 | SYSTOLIC BLOOD PRESSURE: 110 MMHG | TEMPERATURE: 98.9 F | HEIGHT: 61 IN | OXYGEN SATURATION: 99 % | RESPIRATION RATE: 18 BRPM | HEART RATE: 86 BPM | WEIGHT: 116.18 LBS

## 2024-09-27 LAB
ANION GAP SERPL CALCULATED.3IONS-SCNC: 6 MMOL/L (ref 4–13)
BUN SERPL-MCNC: 31 MG/DL (ref 5–25)
CALCIUM SERPL-MCNC: 9 MG/DL (ref 8.4–10.2)
CHLORIDE SERPL-SCNC: 108 MMOL/L (ref 96–108)
CO2 SERPL-SCNC: 26 MMOL/L (ref 21–32)
CREAT SERPL-MCNC: 0.76 MG/DL (ref 0.6–1.3)
ERYTHROCYTE [DISTWIDTH] IN BLOOD BY AUTOMATED COUNT: 13.5 % (ref 11.6–15.1)
GFR SERPL CREATININE-BSD FRML MDRD: 87 ML/MIN/1.73SQ M
GLUCOSE SERPL-MCNC: 86 MG/DL (ref 65–140)
HCT VFR BLD AUTO: 30 % (ref 34.8–46.1)
HGB BLD-MCNC: 9.9 G/DL (ref 11.5–15.4)
MCH RBC QN AUTO: 28.8 PG (ref 26.8–34.3)
MCHC RBC AUTO-ENTMCNC: 33 G/DL (ref 31.4–37.4)
MCV RBC AUTO: 87 FL (ref 82–98)
PLATELET # BLD AUTO: 172 THOUSANDS/UL (ref 149–390)
PMV BLD AUTO: 8.7 FL (ref 8.9–12.7)
POTASSIUM SERPL-SCNC: 3.7 MMOL/L (ref 3.5–5.3)
RBC # BLD AUTO: 3.44 MILLION/UL (ref 3.81–5.12)
SODIUM SERPL-SCNC: 140 MMOL/L (ref 135–147)
WBC # BLD AUTO: 9.59 THOUSAND/UL (ref 4.31–10.16)

## 2024-09-27 PROCEDURE — 80048 BASIC METABOLIC PNL TOTAL CA: CPT | Performed by: INTERNAL MEDICINE

## 2024-09-27 PROCEDURE — 85027 COMPLETE CBC AUTOMATED: CPT | Performed by: INTERNAL MEDICINE

## 2024-09-27 PROCEDURE — 99239 HOSP IP/OBS DSCHRG MGMT >30: CPT

## 2024-09-27 RX ORDER — AMLODIPINE BESYLATE 5 MG/1
5 TABLET ORAL DAILY
Qty: 30 TABLET | Refills: 0 | Status: SHIPPED | OUTPATIENT
Start: 2024-09-28

## 2024-09-27 RX ADMIN — CEFTRIAXONE SODIUM 1000 MG: 10 INJECTION, POWDER, FOR SOLUTION INTRAVENOUS at 01:06

## 2024-09-27 RX ADMIN — AMLODIPINE BESYLATE 5 MG: 5 TABLET ORAL at 09:00

## 2024-09-27 RX ADMIN — CONJUGATED ESTROGENS 0.3 G: 0.62 CREAM VAGINAL at 09:00

## 2024-09-27 RX ADMIN — CYANOCOBALAMIN TAB 500 MCG 1000 MCG: 500 TAB at 08:59

## 2024-09-27 RX ADMIN — OXYCODONE HYDROCHLORIDE 5 MG: 5 TABLET ORAL at 05:54

## 2024-09-27 RX ADMIN — FENOFIBRATE 145 MG: 145 TABLET ORAL at 08:59

## 2024-09-27 RX ADMIN — FERROUS SULFATE TAB 325 MG (65 MG ELEMENTAL FE) 325 MG: 325 (65 FE) TAB at 09:00

## 2024-09-27 RX ADMIN — MORPHINE SULFATE 2 MG: 2 INJECTION, SOLUTION INTRAMUSCULAR; INTRAVENOUS at 01:27

## 2024-09-27 RX ADMIN — URSODIOL 900 MG: 300 CAPSULE ORAL at 09:00

## 2024-09-27 RX ADMIN — OXYCODONE HYDROCHLORIDE 5 MG: 5 TABLET ORAL at 13:00

## 2024-09-27 RX ADMIN — HEPARIN SODIUM 5000 UNITS: 5000 INJECTION INTRAVENOUS; SUBCUTANEOUS at 05:54

## 2024-09-27 RX ADMIN — PREDNISONE 20 MG: 20 TABLET ORAL at 08:59

## 2024-09-27 NOTE — ASSESSMENT & PLAN NOTE
Lab Results   Component Value Date    EGFR 87 09/27/2024    EGFR 73 09/26/2024    EGFR 68 09/25/2024    CREATININE 0.76 09/27/2024    CREATININE 0.88 09/26/2024    CREATININE 0.94 09/25/2024     Creatinine stable and at baseline  Monitor BMP

## 2024-09-27 NOTE — PLAN OF CARE
Problem: PAIN - ADULT  Goal: Verbalizes/displays adequate comfort level or baseline comfort level  Description: Interventions:  - Encourage patient to monitor pain and request assistance  - Assess pain using appropriate pain scale  - Administer analgesics based on type and severity of pain and evaluate response  - Implement non-pharmacological measures as appropriate and evaluate response  - Consider cultural and social influences on pain and pain management  - Notify physician/advanced practitioner if interventions unsuccessful or patient reports new pain  Outcome: Progressing     Problem: INFECTION - ADULT  Goal: Absence or prevention of progression during hospitalization  Description: INTERVENTIONS:  - Assess and monitor for signs and symptoms of infection  - Monitor lab/diagnostic results  - Monitor all insertion sites, i.e. indwelling lines, tubes, and drains  - Monitor endotracheal if appropriate and nasal secretions for changes in amount and color  - [place] appropriate cooling/warming therapies per order  - Administer medications as ordered  - Instruct and encourage patient and family to use good hand hygiene technique  - Identify and instruct in appropriate isolation precautions for identified infection/condition  Outcome: Progressing  Goal: Absence of fever/infection during neutropenic period  Description: INTERVENTIONS:  - Monitor WBC    Outcome: Progressing     Problem: SAFETY ADULT  Goal: Patient will remain free of falls  Description: INTERVENTIONS:  - Educate patient/family on patient safety including physical limitations  - Instruct patient to call for assistance with activity   - Consult OT/PT to assist with strengthening/mobility   - Keep Call bell within reach  - Keep bed low and locked with side rails adjusted as appropriate  - Keep care items and personal belongings within reach  - Initiate and maintain comfort rounds  - Make Fall Risk Sign visible to staff  - Offer Toileting every 2 Hours,  in advance of need  - Initiate/Maintain alarm  - Obtain necessary fall risk management equipment:   - Apply yellow socks and bracelet for high fall risk patients  - Consider moving patient to room near nurses station  Outcome: Progressing  Goal: Maintain or return to baseline ADL function  Description: INTERVENTIONS:  -  Assess patient's ability to carry out ADLs; assess patient's baseline for ADL function and identify physical deficits which impact ability to perform ADLs (bathing, care of mouth/teeth, toileting, grooming, dressing, etc.)  - Assess/evaluate cause of self-care deficits   - Assess range of motion  - Assess patient's mobility; develop plan if impaired  - Assess patient's need for assistive devices and provide as appropriate  - Encourage maximum independence but intervene and supervise when necessary  - Involve family in performance of ADLs  - Assess for home care needs following discharge   - Consider OT consult to assist with ADL evaluation and planning for discharge  - Provide patient education as appropriate  Outcome: Progressing  Goal: Maintains/Returns to pre admission functional level  Description: INTERVENTIONS:  - Perform AM-PAC 6 Click Basic Mobility/ Daily Activity assessment daily.  - Set and communicate daily mobility goal to care team and patient/family/caregiver.   - Collaborate with rehabilitation services on mobility goals if consulted  - Perform Range of Motion 2 times a day.  - Reposition patient every 2 hours.  - Dangle patient 2 times a day  - Stand patient 2 times a day  - Ambulate patient 2 times a day  - Out of bed to chair 2 times a day   - Out of bed for meals 2 times a day  - Out of bed for toileting  - Record patient progress and toleration of activity level   Outcome: Progressing     Problem: DISCHARGE PLANNING  Goal: Discharge to home or other facility with appropriate resources  Description: INTERVENTIONS:  - Identify barriers to discharge w/patient and caregiver  -  Arrange for needed discharge resources and transportation as appropriate  - Identify discharge learning needs (meds, wound care, etc.)  - Arrange for interpretive services to assist at discharge as needed  - Refer to Case Management Department for coordinating discharge planning if the patient needs post-hospital services based on physician/advanced practitioner order or complex needs related to functional status, cognitive ability, or social support system  Outcome: Progressing     Problem: Knowledge Deficit  Goal: Patient/family/caregiver demonstrates understanding of disease process, treatment plan, medications, and discharge instructions  Description: Complete learning assessment and assess knowledge base.  Interventions:  - Provide teaching at level of understanding  - Provide teaching via preferred learning methods  Outcome: Progressing

## 2024-09-27 NOTE — ASSESSMENT & PLAN NOTE
5/25/23 completed a course of neoadjuvant ChemoRT with concurrent 5-FU  Follows with rad onc and heme onc as an outpatient  Initial treatment done in [facility] and then transferred care to [facility]

## 2024-09-27 NOTE — TELEPHONE ENCOUNTER
Patient needs a 3 month hospital follow up schedule form 9/12 appt. With Dr. Jennifer Canseco for Sarcoidosis around Dec 12.

## 2024-09-27 NOTE — DISCHARGE INSTR - AVS FIRST PAGE
Dear Danielle Haque,     It was our pleasure to care for you here at [facility].  It is our hope that we were always able to exceed the expected standards for your care during your stay.  You were hospitalized due to bilateral hydronephrosis with dysuria.  You were cared for on the MedSur floor by Jill Prado PA-C under the service of Mercedez Mina MD with the [facility] who covers for your primary care physician (PCP), Leena Anaya MD, while you were hospitalized.  If you have any questions or concerns related to this hospitalization, you may contact us at .  For follow up as well as any medication refills, we recommend that you follow up with your primary care physician.  A registered nurse will reach out to you by phone within a few days after your discharge to answer any additional questions that you may have after going home.  However, at this time we provide for you here, the most important instructions / recommendations at discharge:     Notable Medication Adjustments -   Change how you take amlodipine, take amlodipine 5 mg 1 tablet daily,STOP taking amlodipine 10 mg daily  Continue Premarin vaginal cream  Testing Required after Discharge -   none  ** Please contact your PCP to request testing orders for any of the testing recommended here **  Important follow up information -   Please follow-up with your urologist Dr. Stephens as an outpatient  Follow-up with your gynecologist  Other Instructions -   Return to the ER for further evaluation if you develop fever, chills, severe abdominal/flank pain/back pain, blood in the urine.  Please review this entire after visit summary as additional general instructions including medication list, appointments, activity, diet, any pertinent wound care, and other additional recommendations from your care team that may be provided for you.      Sincerely,     Jill Prado PA-C

## 2024-09-27 NOTE — ASSESSMENT & PLAN NOTE
Abnormal UA and area of possible pyelo on CT  Could be related to chronic colonization and chronic changes from complex urological history  Prior urine cultures have shown mixed contaminants only  S/P IV ceftriaxone x 4 days  Urine culture now positive for lactobacillus species

## 2024-09-27 NOTE — PLAN OF CARE
Problem: PAIN - ADULT  Goal: Verbalizes/displays adequate comfort level or baseline comfort level  Description: Interventions:  - Encourage patient to monitor pain and request assistance  - Assess pain using appropriate pain scale  - Administer analgesics based on type and severity of pain and evaluate response  - Implement non-pharmacological measures as appropriate and evaluate response  - Consider cultural and social influences on pain and pain management  - Notify physician/advanced practitioner if interventions unsuccessful or patient reports new pain  9/27/2024 1428 by Michelle Solorzano RN  Outcome: Completed  9/27/2024 1014 by Michelle Solorzano RN  Outcome: Progressing     Problem: INFECTION - ADULT  Goal: Absence or prevention of progression during hospitalization  Description: INTERVENTIONS:  - Assess and monitor for signs and symptoms of infection  - Monitor lab/diagnostic results  - Monitor all insertion sites, i.e. indwelling lines, tubes, and drains  - Monitor endotracheal if appropriate and nasal secretions for changes in amount and color  - [place] appropriate cooling/warming therapies per order  - Administer medications as ordered  - Instruct and encourage patient and family to use good hand hygiene technique  - Identify and instruct in appropriate isolation precautions for identified infection/condition  9/27/2024 1428 by Michelle Solorzano RN  Outcome: Completed  9/27/2024 1014 by Michelle Solorzano RN  Outcome: Progressing  Goal: Absence of fever/infection during neutropenic period  Description: INTERVENTIONS:  - Monitor WBC    9/27/2024 1428 by Michelle Solorzano RN  Outcome: Completed  9/27/2024 1014 by Michelle Solorzano RN  Outcome: Progressing     Problem: SAFETY ADULT  Goal: Patient will remain free of falls  Description: INTERVENTIONS:  - Educate patient/family on patient safety including physical limitations  - Instruct patient to call for assistance with activity   - Consult OT/PT to assist  with strengthening/mobility   - Keep Call bell within reach  - Keep bed low and locked with side rails adjusted as appropriate  - Keep care items and personal belongings within reach  - Initiate and maintain comfort rounds  - Make Fall Risk Sign visible to staff  - Offer Toileting every  Hours, in advance of need  - Initiate/Maintain alarm  - Obtain necessary fall risk management equipment:   - Apply yellow socks and bracelet for high fall risk patients  - Consider moving patient to room near nurses station  9/27/2024 1428 by Michelle Solorzano RN  Outcome: Completed  9/27/2024 1014 by Michelle Solorzano RN  Outcome: Progressing  Goal: Maintain or return to baseline ADL function  Description: INTERVENTIONS:  -  Assess patient's ability to carry out ADLs; assess patient's baseline for ADL function and identify physical deficits which impact ability to perform ADLs (bathing, care of mouth/teeth, toileting, grooming, dressing, etc.)  - Assess/evaluate cause of self-care deficits   - Assess range of motion  - Assess patient's mobility; develop plan if impaired  - Assess patient's need for assistive devices and provide as appropriate  - Encourage maximum independence but intervene and supervise when necessary  - Involve family in performance of ADLs  - Assess for home care needs following discharge   - Consider OT consult to assist with ADL evaluation and planning for discharge  - Provide patient education as appropriate  9/27/2024 1428 by Michelle Solorzano RN  Outcome: Completed  9/27/2024 1014 by Michelle Solorzano RN  Outcome: Progressing  Goal: Maintains/Returns to pre admission functional level  Description: INTERVENTIONS:  - Perform AM-PAC 6 Click Basic Mobility/ Daily Activity assessment daily.  - Set and communicate daily mobility goal to care team and patient/family/caregiver.   - Collaborate with rehabilitation services on mobility goals if consulted  - Perform Range of Motion  times a day.  - Reposition patient every   hours.  - Dangle patient  times a day  - Stand patient  times a day  - Ambulate patient  times a day  - Out of bed to chair  times a day   - Out of bed for meals  times a day  - Out of bed for toileting  - Record patient progress and toleration of activity level   9/27/2024 1428 by Michelle Solorzano RN  Outcome: Completed  9/27/2024 1014 by Michelle Solorzano RN  Outcome: Progressing     Problem: DISCHARGE PLANNING  Goal: Discharge to home or other facility with appropriate resources  Description: INTERVENTIONS:  - Identify barriers to discharge w/patient and caregiver  - Arrange for needed discharge resources and transportation as appropriate  - Identify discharge learning needs (meds, wound care, etc.)  - Arrange for interpretive services to assist at discharge as needed  - Refer to Case Management Department for coordinating discharge planning if the patient needs post-hospital services based on physician/advanced practitioner order or complex needs related to functional status, cognitive ability, or social support system  9/27/2024 1428 by Michelle Solorzano RN  Outcome: Completed  9/27/2024 1014 by Michelle Solorzano RN  Outcome: Progressing     Problem: Knowledge Deficit  Goal: Patient/family/caregiver demonstrates understanding of disease process, treatment plan, medications, and discharge instructions  Description: Complete learning assessment and assess knowledge base.  Interventions:  - Provide teaching at level of understanding  - Provide teaching via preferred learning methods  9/27/2024 1428 by Michelle Solorzano RN  Outcome: Completed  9/27/2024 1014 by Michelle Solorzano RN  Outcome: Progressing

## 2024-09-27 NOTE — PLAN OF CARE
Problem: PAIN - ADULT  Goal: Verbalizes/displays adequate comfort level or baseline comfort level  Description: Interventions:  - Encourage patient to monitor pain and request assistance  - Assess pain using appropriate pain scale  - Administer analgesics based on type and severity of pain and evaluate response  - Implement non-pharmacological measures as appropriate and evaluate response  - Consider cultural and social influences on pain and pain management  - Notify physician/advanced practitioner if interventions unsuccessful or patient reports new pain  Outcome: Progressing     Problem: INFECTION - ADULT  Goal: Absence or prevention of progression during hospitalization  Description: INTERVENTIONS:  - Assess and monitor for signs and symptoms of infection  - Monitor lab/diagnostic results  - Monitor all insertion sites, i.e. indwelling lines, tubes, and drains  - Monitor endotracheal if appropriate and nasal secretions for changes in amount and color  - [place] appropriate cooling/warming therapies per order  - Administer medications as ordered  - Instruct and encourage patient and family to use good hand hygiene technique  - Identify and instruct in appropriate isolation precautions for identified infection/condition  Outcome: Progressing  Goal: Absence of fever/infection during neutropenic period  Description: INTERVENTIONS:  - Monitor WBC    Outcome: Progressing     Problem: SAFETY ADULT  Goal: Patient will remain free of falls  Description: INTERVENTIONS:  - Educate patient/family on patient safety including physical limitations  - Instruct patient to call for assistance with activity   - Consult OT/PT to assist with strengthening/mobility   - Keep Call bell within reach  - Keep bed low and locked with side rails adjusted as appropriate  - Keep care items and personal belongings within reach  - Initiate and maintain comfort rounds  - Make Fall Risk Sign visible to staff  - Offer Toileting every  Hours,  in advance of need  - Initiate/Maintain alarm  - Obtain necessary fall risk management equipment:   - Apply yellow socks and bracelet for high fall risk patients  - Consider moving patient to room near nurses station  Outcome: Progressing  Goal: Maintain or return to baseline ADL function  Description: INTERVENTIONS:  -  Assess patient's ability to carry out ADLs; assess patient's baseline for ADL function and identify physical deficits which impact ability to perform ADLs (bathing, care of mouth/teeth, toileting, grooming, dressing, etc.)  - Assess/evaluate cause of self-care deficits   - Assess range of motion  - Assess patient's mobility; develop plan if impaired  - Assess patient's need for assistive devices and provide as appropriate  - Encourage maximum independence but intervene and supervise when necessary  - Involve family in performance of ADLs  - Assess for home care needs following discharge   - Consider OT consult to assist with ADL evaluation and planning for discharge  - Provide patient education as appropriate  Outcome: Progressing    Problem: DISCHARGE PLANNING  Goal: Discharge to home or other facility with appropriate resources  Description: INTERVENTIONS:  - Identify barriers to discharge w/patient and caregiver  - Arrange for needed discharge resources and transportation as appropriate  - Identify discharge learning needs (meds, wound care, etc.)  - Arrange for interpretive services to assist at discharge as needed  - Refer to Case Management Department for coordinating discharge planning if the patient needs post-hospital services based on physician/advanced practitioner order or complex needs related to functional status, cognitive ability, or social support system  Outcome: Progressing     Problem: Knowledge Deficit  Goal: Patient/family/caregiver demonstrates understanding of disease process, treatment plan, medications, and discharge instructions  Description: Complete learning assessment  and assess knowledge base.  Interventions:  - Provide teaching at level of understanding  - Provide teaching via preferred learning methods  Outcome: Progressing

## 2024-09-27 NOTE — DISCHARGE SUMMARY
Discharge Summary - Hospitalist   Name: Danielle Haque 58 y.o. female I MRN: [id number]  Unit/Bed#: S -01 I Date of Admission: 9/23/2024   Date of Service: 9/27/2024 I Hospital Day: 2     Assessment & Plan  Bilateral hydronephrosis  C shows bilateral hydronephrosis, right greater than left, worse compared with June 24, 2024. Areas of urothelial enhancement, as described above, concerning for possible superimposed pyelonephritis. Bilateral ureteral stents are in place. A focal area of concretion/encrustation adjacent to the proximal right right ureteral stent appears similar to June 24, 2024. There is a 5 mm calculus in the mid to distal left ureter adjacent to the left ureteral stent; this appears new compared to the prior study. There is bilateral nephrolithiasis.  Follows with urology, Dr. Stephens  Initially was planned for treatment of left ureteral stricture on September 13 however this was delayed given recent admission and was rescheduled for October 18.  During prior urology visits it was felt that the right side would need reconstruction given it is heavily encrusted and not amenable to laparoscopic interventions  ED discussed with urology who felt the patient could be discharged home but given inability to control pain she was prompted for admission  UA abnormal,  Urine cultures positive for lactobacillus species, given this is normal micro biome yet patient has constant dysuria completed 4 doses of IV ceftriaxone  Urology consulted:   9/26 bilateral ureteral stent replacement, tolerated the procedure well and has improved dysuria and no abdominal pain.  Recommending urology consultation for possible reconstructive surgery  Possible UTI (urinary tract infection)  Abnormal UA and area of possible pyelo on CT  Could be related to chronic colonization and chronic changes from complex urological history  Prior urine cultures have shown mixed contaminants only  S/P IV ceftriaxone x 4 days  Urine culture  now positive for lactobacillus species    Abnormal CT scan, pelvis  Noted to have fluid in the proximal vagina on CAT scan.  Recommend follow up with GYN   Rectal cancer (HCC)  5/25/23 completed a course of neoadjuvant ChemoRT with concurrent 5-FU  Follows with rad onc and heme onc as an outpatient  Initial treatment done in [facility] and then transferred care to [facility]  Cancer related pain  Patient follows with palliative care but her pelvic pain is not felt to be cancer related  Continue PRN Oxycodone  Hypertension  BP acceptable   Continue reduced dose Norvasc at 5 mg QD   Sarcoidosis  History of biopsy proven sarcoidosis in the lung and liver.    Follows with GI/Dr. Machuca in the outpatient setting.    Currently on ursodiol 900 mg daily \  Resumed on steroids during most recent admission at 40 mg daily x 5 days and then tapering by 10 mg every week  CKD stage G3a/A2, GFR 45-59 and albumin creatinine ratio  mg/g (HCC)  Lab Results   Component Value Date    EGFR 87 09/27/2024    EGFR 73 09/26/2024    EGFR 68 09/25/2024    CREATININE 0.76 09/27/2024    CREATININE 0.88 09/26/2024    CREATININE 0.94 09/25/2024     Creatinine stable and at baseline  Monitor BMP     Medical Problems       Resolved Problems  Date Reviewed: 9/26/2024   None       Discharging Physician / Practitioner: Jill Prado PA-C  PCP: Leena Anaya MD  Admission Date:   Admission Orders (From admission, onward)       Ordered        09/25/24 1645  INPATIENT ADMISSION  Once            09/24/24 0306  Place in Observation  Once                          Discharge Date: 09/27/24    Consultations During Hospital Stay:  Urology    Procedures Performed:   Cystoscopy with bilateral retrograde pyelogram and ureteral stent exchange    Significant Findings / Test Results:   CT abdomen and pelvis with contrast:  Bilateral hydronephrosis, right greater than left, worse compared with June 24, 2024. Areas of urothelial enhancement, as described above, concerning for  possible superimposed pyelonephritis. Correlation with urinalysis and urine culture and sensitivity   is recommended.   Bilateral ureteral stents are in place. A focal area of concretion/encrustation adjacent to the proximal right right ureteral stent appears similar to June 24, 2024. There is a 5 mm calculus in the mid to distal left ureter adjacent to the left ureteral   stent; this appears new compared to the prior study. There is bilateral nephrolithiasis.   There is some fluid in the proximal vagina. Clinical correlation recommended. Consider Gynecology consultation, if clinically appropriate.   Incidental Findings:   None    Test Results Pending at Discharge (will require follow up):   None     Outpatient Tests Requested:  None    Complications: None    Reason for Admission: Bilateral hydronephrosis with dysuria    Hospital Course:   Danielle Haque is a 58 y.o. female patient who originally presented to the hospital on 9/23/2024 due to vaginal pain and dysuria.  Patient is a complex history of ureteral strictures with bilateral stents.  Urology was consulted and patient underwent bilateral cystoscopy with ureteral stent replacements and had improvement of symptoms.  Her urine culture also noted to grow lactobacillus species, given her recent complaints of dysuria and positive urine culture was treated on 4 days of IV ceftriaxone with resolution of symptoms.  The patient was also started on Premarin vaginal cream with improvement of vaginal pain.  She now is back to her baseline and is no longer requiring supplemental pain medications aside from her chronic opioids for her cancer related pain.  The patient denies any fever, chills, abdominal pain/flank pain, nausea or vomiting.  Patient understands the plan and is in agreement with discharge home with strict return precautions.  She plans to follow-up with her urologist as an outpatient.      Please see above list of diagnoses and related plan for additional  "information.     Condition at Discharge: good    Discharge Day Visit / Exam:   Subjective: Patient complains of severe abdominal and \"kidney pain\" last night post procedure however, this is all resolved and she no longer required any supplemental pain medications.  She notes mild hematuria, which is to be expected post stent placement.  She denies any dysuria, vaginal pain, vaginal bleeding, flank pain, fever, chills.  Vitals: Blood Pressure: 110/50 (09/27/24 0848)  Pulse: 86 (09/27/24 0848)  Temperature: 98.9 °F (37.2 °C) (09/27/24 0848)  Temp Source: Temporal (09/26/24 1117)  Respirations: 18 (09/26/24 1430)  Height: 5' 1\" (154.9 cm) (09/24/24 0400)  Weight - Scale: 52.7 kg (116 lb 2.9 oz) (09/24/24 0400)  SpO2: 99 % (09/27/24 0848)  Exam:   Physical Exam  Vitals reviewed.   Constitutional:       Appearance: Normal appearance.   HENT:      Mouth/Throat:      Mouth: Mucous membranes are moist.   Cardiovascular:      Rate and Rhythm: Normal rate and regular rhythm.      Heart sounds: Normal heart sounds.   Pulmonary:      Effort: Pulmonary effort is normal.      Breath sounds: Normal breath sounds. No rales.   Abdominal:      General: Bowel sounds are normal.      Palpations: Abdomen is soft.      Tenderness: There is no abdominal tenderness. There is no right CVA tenderness or left CVA tenderness.   Skin:     General: Skin is warm and dry.   Neurological:      Mental Status: She is alert.          Discussion with Family: Updated  () via phone.    Discharge instructions/Information to patient and family:   See after visit summary for information provided to patient and family.      Provisions for Follow-Up Care:  See after visit summary for information related to follow-up care and any pertinent home health orders.      Mobility at time of Discharge:   Basic Mobility Inpatient Raw Score: 24  JH-HLM Goal: 8: Walk 250 feet or more  JH-HLM Achieved: 7: Walk 25 feet or more  HLM Goal NOT achieved. " Continue to encourage mobility in post discharge setting.     Disposition:   Home    Planned Readmission: No    Discharge Medications:  See after visit summary for reconciled discharge medications provided to patient and/or family.      Administrative Statements   Discharge Statement:  I have spent a total time of 45 minutes in caring for this patient on the day of the visit/encounter. >30 minutes of time was spent on: Diagnostic results, Patient and family education, Counseling / Coordination of care, Documenting in the medical record, and Communicating with other healthcare professionals .    **Please Note: This note may have been constructed using a voice recognition system**

## 2024-09-27 NOTE — ASSESSMENT & PLAN NOTE
C shows bilateral hydronephrosis, right greater than left, worse compared with June 24, 2024. Areas of urothelial enhancement, as described above, concerning for possible superimposed pyelonephritis. Bilateral ureteral stents are in place. A focal area of concretion/encrustation adjacent to the proximal right right ureteral stent appears similar to June 24, 2024. There is a 5 mm calculus in the mid to distal left ureter adjacent to the left ureteral stent; this appears new compared to the prior study. There is bilateral nephrolithiasis.  Follows with urology, Dr. Stephens  Initially was planned for treatment of left ureteral stricture on September 13 however this was delayed given recent admission and was rescheduled for October 18.  During prior urology visits it was felt that the right side would need reconstruction given it is heavily encrusted and not amenable to laparoscopic interventions  ED discussed with urology who felt the patient could be discharged home but given inability to control pain she was prompted for admission  UA abnormal,  Urine cultures positive for lactobacillus species, given this is normal micro biome yet patient has constant dysuria completed 4 doses of IV ceftriaxone  Urology consulted:   9/26 bilateral ureteral stent replacement, tolerated the procedure well and has improved dysuria and no abdominal pain.  Recommending urology consultation for possible reconstructive surgery

## 2024-09-30 ENCOUNTER — TELEPHONE (OUTPATIENT)
Age: 59
End: 2024-09-30

## 2024-09-30 NOTE — TELEPHONE ENCOUNTER
Called and spoke to Mateo to set up a hospital appointment with Dr. Canseco in December. Patient already schedule on 10/15. Message sent to provider regarding appointment. Discussing with provider if appointment needs to be moved to December due to 10/15 being to soon. Message sent to provider to discuss.

## 2024-10-01 NOTE — TELEPHONE ENCOUNTER
Called and spoke to Mateo regarding appointment with Hepatology. Stated he will speak to patient regarding if she wants to keep October appt or is fine with pushing appointment till December. Stated he will speak to the patient and call back to cancel on of the appointments.

## 2024-10-02 ENCOUNTER — TELEPHONE (OUTPATIENT)
Age: 59
End: 2024-10-02

## 2024-10-02 ENCOUNTER — HOSPITAL ENCOUNTER (OUTPATIENT)
Dept: CT IMAGING | Facility: HOSPITAL | Age: 59
Discharge: HOME | End: 2024-10-02
Attending: STUDENT IN AN ORGANIZED HEALTH CARE EDUCATION/TRAINING PROGRAM
Payer: COMMERCIAL

## 2024-10-02 ENCOUNTER — APPOINTMENT (OUTPATIENT)
Dept: RADIOLOGY | Facility: HOSPITAL | Age: 59
End: 2024-10-02
Payer: COMMERCIAL

## 2024-10-02 DIAGNOSIS — R91.8 LUNG NODULE, MULTIPLE: ICD-10-CM

## 2024-10-02 DIAGNOSIS — C20 RECTAL CANCER (HCC): ICD-10-CM

## 2024-10-02 PROCEDURE — 71260 CT THORAX DX C+: CPT

## 2024-10-02 PROCEDURE — 74177 CT ABD & PELVIS W/CONTRAST: CPT

## 2024-10-02 RX ADMIN — IOHEXOL 100 ML: 350 INJECTION, SOLUTION INTRAVENOUS at 11:38

## 2024-10-02 NOTE — TELEPHONE ENCOUNTER
Received a phone call from Aminah in radiology.  Patient is scheduled for a CT scan today at 1100 and inquiring if port can be accessed to administer contrast.  Informed Aminah that it would be ok to do so.  Aminah would like someone to message her stating that it is ok to use patient's port for CT scan.

## 2024-10-08 ENCOUNTER — TELEPHONE (OUTPATIENT)
Dept: RADIATION ONCOLOGY | Facility: HOSPITAL | Age: 59
End: 2024-10-08

## 2024-10-08 NOTE — TELEPHONE ENCOUNTER
Mateo called to confirm telephone visit with  for 10/10. Advised Mateo that the appointment was canceled via [employer]. Mateo requested to put patient on  schedule. Patient is scheduled for 10/15, per last [employer] message  approved telephone call to discuss CT scan results. Tried to schedule virtual but do not have access. Mateo also mentioned that he sent a [employer] message to office and no one has responded to that [employer] message.     Please change 10/15 appointment to virtual.       Thanks!

## 2024-10-13 NOTE — PROGRESS NOTES
Ambulatory Visit  Name: Danielle Haque      : 1965      MRN: [id number]  Encounter Provider: Jennifer Canseco MD  Encounter Date: 10/15/2024   Encounter department: [facility] RHEUMATOLOGY [address]    Assessment & Plan  Sarcoidosis  Pt with hx of pulmonary and hepatic sarcoidosis, currently with stable disease, no new symptoms. Her hypercalcemia has resolved and she completed steroid taper since hospital admission. She does have dry eyes and some minor visual changes that I talked to her about seeing an eye specialist to evaluate for uveitis. She denies any respiratory, joint or skin complaints at this time.   Plan:  Continue to monitor off steroids  Close f/u with pulmonology and GI   Labs for monitoring   Eye appointment to evaluate for uveitis  DEXA scan since she was on chronic steroids in the past  RTC 1 year  Orders:    Sedimentation rate, automated; Future    C-reactive protein; Future    Angiotensin converting enzyme; Future    CBC and differential; Future    Comprehensive metabolic panel; Future    Pulmonary sarcoidosis (HCC)  Plan as above, f/u with Pulmonology         History of Present Illness     Danielle Haque is a 58 y.o. 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    Patient was referred to Rheumatology in 2023 for evaluation of alternative organ involvement of her sarcoidosis. At that time she was not having any systemic symptoms and autoimmune serologic workup thereafter was negative for other underlying rheumatologic disorders.    Patient was hospitalized in September for labs showing hypercalcemia with Ca level above 11, at which time Rheumatology had been consulted and determined she was probably in a Sarcoid flare and had been prescribed a steroid taper.ACE level was 122 (increased from 117 in August, 191 in  "May). Vitamin D 1,25 was within normal limits. Her calcium level last checked at the end of September had normalized to 9. She has completed the steroid taper that was prescribed in hospital.    At this time she denies any complaints of arthropathy, SOB, chest pain,pain/erythema in the eyes, rashes including subcutaneous nodules, etc.    She only complains of mild dryness in the eyes at this time, has not had an eye exam this year to evaluate for uveitis.     She also has not had DEXA scan.        Review of Systems   Constitutional: Negative.    HENT: Negative.     Eyes:  Positive for visual disturbance.        Dry eyes     Respiratory: Negative.     Cardiovascular: Negative.    Gastrointestinal: Negative.    Musculoskeletal: Negative.    Skin: Negative.            Objective     /70   Pulse 66   Temp 98.2 °F (36.8 °C) (Tympanic)   Ht 5' 2\" (1.575 m)   Wt 54.3 kg (119 lb 9.6 oz)   SpO2 100%   BMI 21.88 kg/m²     Physical Exam  Constitutional:       Appearance: Normal appearance.   HENT:      Head: Normocephalic.   Eyes:      Extraocular Movements: Extraocular movements intact.      Pupils: Pupils are equal, round, and reactive to light.   Cardiovascular:      Pulses: Normal pulses.      Heart sounds: Normal heart sounds.   Pulmonary:      Effort: Pulmonary effort is normal.      Breath sounds: Normal breath sounds.   Musculoskeletal:      Comments: MSK Exam  The following areas have been examined today and do not show any signs of synovitis, tenderness, or restricted ROM unless otherwise specified below:  Shoulders  Elbows  Wrists  Hands  Hips  Knees  Ankles  Feet    Skin:     General: Skin is warm and dry.   Neurological:      Mental Status: She is alert and oriented to person, place, and time.         "

## 2024-10-15 ENCOUNTER — TELEPHONE (OUTPATIENT)
Age: 59
End: 2024-10-15

## 2024-10-15 ENCOUNTER — APPOINTMENT (OUTPATIENT)
Dept: RADIATION ONCOLOGY | Facility: HOSPITAL | Age: 59
End: 2024-10-15
Attending: STUDENT IN AN ORGANIZED HEALTH CARE EDUCATION/TRAINING PROGRAM
Payer: COMMERCIAL

## 2024-10-15 ENCOUNTER — APPOINTMENT (OUTPATIENT)
Dept: LAB | Facility: AMBULARY SURGERY CENTER | Age: 59
End: 2024-10-15
Payer: COMMERCIAL

## 2024-10-15 ENCOUNTER — OFFICE VISIT (OUTPATIENT)
Age: 59
End: 2024-10-15

## 2024-10-15 VITALS
BODY MASS INDEX: 22.01 KG/M2 | WEIGHT: 119.6 LBS | DIASTOLIC BLOOD PRESSURE: 70 MMHG | HEART RATE: 66 BPM | TEMPERATURE: 98.2 F | SYSTOLIC BLOOD PRESSURE: 122 MMHG | OXYGEN SATURATION: 100 % | HEIGHT: 62 IN

## 2024-10-15 DIAGNOSIS — D86.9 SARCOIDOSIS: Primary | ICD-10-CM

## 2024-10-15 DIAGNOSIS — D86.0 PULMONARY SARCOIDOSIS (HCC): ICD-10-CM

## 2024-10-15 DIAGNOSIS — N13.5 URETERAL STRICTURE: ICD-10-CM

## 2024-10-15 DIAGNOSIS — R39.89 SUSPECTED UTI: ICD-10-CM

## 2024-10-15 PROCEDURE — 87086 URINE CULTURE/COLONY COUNT: CPT

## 2024-10-15 NOTE — TELEPHONE ENCOUNTER
Patient has surgery for 10/18/24. No urine culture from 10/4/24 as per surgery scheduler note. Patient will have it done today.  Please advise and send mychart to faizan Vo

## 2024-10-15 NOTE — ASSESSMENT & PLAN NOTE
Pt with hx of pulmonary and hepatic sarcoidosis, currently with stable disease, no new symptoms. Her hypercalcemia has resolved and she completed steroid taper since hospital admission. She does have dry eyes and some minor visual changes that I talked to her about seeing an eye specialist to evaluate for uveitis. She denies any respiratory, joint or skin complaints at this time.   Plan:  Continue to monitor off steroids  Close f/u with pulmonology and GI   Labs for monitoring   Eye appointment to evaluate for uveitis  DEXA scan since she was on chronic steroids in the past  RTC 1 year  Orders:    Sedimentation rate, automated; Future    C-reactive protein; Future    Angiotensin converting enzyme; Future    CBC and differential; Future    Comprehensive metabolic panel; Future

## 2024-10-16 LAB — BACTERIA UR CULT: NORMAL

## 2024-10-17 NOTE — PRE-PROCEDURE INSTRUCTIONS
Pre-Surgery Instructions:   Medication Instructions    amLODIPine (NORVASC) 5 mg tablet Take day of surgery.    cyanocobalamin (VITAMIN B-12) 1000 MCG tablet Hold day of surgery.    estrogens, conjugated (Premarin) vaginal cream Hold day of surgery.    fenofibrate (TRICOR) 145 mg tablet Take day of surgery.    ondansetron (ZOFRAN) 4 mg tablet Uses PRN- OK to take day of surgery    oxybutynin (DITROPAN) 5 mg tablet Take night before surgery    oxyCODONE (Roxicodone) 5 immediate release tablet Uses PRN- OK to take day of surgery    potassium chloride (MICRO-K) 10 MEQ CR capsule Hold day of surgery.    tamsulosin (FLOMAX) 0.4 mg Take night before surgery    ursodiol (ACTIGALL) 300 mg capsule Take night before surgery    Medication instructions for day surgery reviewed. Please use only a sip of water to take your instructed medications. Avoid all over the counter vitamins, supplements and NSAIDS for one week prior to surgery per anesthesia guidelines. Tylenol is ok to take as needed.     You will receive a call one business day prior to surgery with an arrival time and hospital directions. If your surgery is scheduled on a Monday, the hospital will be calling you on the Friday prior to your surgery. If you have not heard from anyone by 8pm, please call the hospital supervisor through the hospital  at [phone]. ([place] [phone] or [place] [phone]).    Do not eat or drink anything after midnight the night before your surgery, including candy, mints, lifesavers, or chewing gum. Do not drink alcohol 24hrs before your surgery. Try not to smoke at least 24hrs before your surgery.       Follow the pre surgery showering instructions as listed in the “My Surgical Experience Booklet” or otherwise provided by your surgeon's office. Do not use a blade to shave the surgical area 1 week before surgery. It is okay to use a clean electric clippers up to 24 hours before surgery. Do not apply any lotions, creams,  including makeup, cologne, deodorant, or perfumes after showering on the day of your surgery. Do not use dry shampoo, hair spray, hair gel, or any type of hair products.     No contact lenses, eye make-up, or artificial eyelashes. Remove nail polish, including gel polish, and any artificial, gel, or acrylic nails if possible. Remove all jewelry including rings and body piercing jewelry.     Wear causal clothing that is easy to take on and off. Consider your type of surgery.    Keep any valuables, jewelry, piercings at home. Please bring any specially ordered equipment (sling, braces) if indicated.    Arrange for a responsible person to drive you to and from the hospital on the day of your surgery. Please confirm the visitor policy for the day of your procedure when you receive your phone call with an arrival time.     Call the surgeon's office with any new illnesses, exposures, or additional questions prior to surgery.    Please reference your “My Surgical Experience Booklet” for additional information to prepare for your upcoming surgery.

## 2024-10-18 ENCOUNTER — HOSPITAL ENCOUNTER (OUTPATIENT)
Dept: RADIOLOGY | Facility: HOSPITAL | Age: 59
Setting detail: OUTPATIENT SURGERY
Discharge: HOME | End: 2024-10-18
Payer: COMMERCIAL

## 2024-10-18 ENCOUNTER — HOSPITAL ENCOUNTER (OUTPATIENT)
Facility: HOSPITAL | Age: 59
Setting detail: OUTPATIENT SURGERY
Discharge: HOME | End: 2024-10-18
Attending: UROLOGY | Admitting: UROLOGY
Payer: COMMERCIAL

## 2024-10-18 VITALS
TEMPERATURE: 97.8 F | WEIGHT: 119 LBS | HEIGHT: 62 IN | OXYGEN SATURATION: 97 % | SYSTOLIC BLOOD PRESSURE: 130 MMHG | HEART RATE: 98 BPM | RESPIRATION RATE: 16 BRPM | BODY MASS INDEX: 21.9 KG/M2 | DIASTOLIC BLOOD PRESSURE: 67 MMHG

## 2024-10-18 DIAGNOSIS — N20.1 URETERAL CALCULI: ICD-10-CM

## 2024-10-18 DIAGNOSIS — N13.30 BILATERAL HYDRONEPHROSIS: Primary | ICD-10-CM

## 2024-10-18 PROBLEM — H61.21 IMPACTED CERUMEN OF RIGHT EAR: Status: RESOLVED | Noted: 2024-09-18 | Resolved: 2024-10-18

## 2024-10-18 PROCEDURE — 52344 CYSTO/URETERO STRICTURE TX: CPT | Performed by: UROLOGY

## 2024-10-18 PROCEDURE — 74420 UROGRAPHY RTRGR +-KUB: CPT

## 2024-10-18 PROCEDURE — C1758 CATHETER, URETERAL: HCPCS | Performed by: UROLOGY

## 2024-10-18 PROCEDURE — C2617 STENT, NON-COR, TEM W/O DEL: HCPCS | Performed by: UROLOGY

## 2024-10-18 PROCEDURE — NC001 PR NO CHARGE: Performed by: UROLOGY

## 2024-10-18 PROCEDURE — C1769 GUIDE WIRE: HCPCS | Performed by: UROLOGY

## 2024-10-18 PROCEDURE — C1726 CATH, BAL DIL, NON-VASCULAR: HCPCS | Performed by: UROLOGY

## 2024-10-18 PROCEDURE — C1894 INTRO/SHEATH, NON-LASER: HCPCS | Performed by: UROLOGY

## 2024-10-18 PROCEDURE — 52332 CYSTOSCOPY AND TREATMENT: CPT | Performed by: UROLOGY

## 2024-10-18 PROCEDURE — C1747 URETEROSCOPE DIGITAL FLEX SNGL USE STD DEFLECTION APTRA: HCPCS | Performed by: UROLOGY

## 2024-10-18 PROCEDURE — C2625 STENT, NON-COR, TEM W/DEL SY: HCPCS | Performed by: UROLOGY

## 2024-10-18 DEVICE — STENT URETERAL 7FR 24CM INLAY OPTIMA W/HYDROGLIDE GDWR: Type: IMPLANTABLE DEVICE | Site: URETER | Status: FUNCTIONAL

## 2024-10-18 DEVICE — STENT URETERAL 6FR 22CM INLAY OPTIMA W/NITINOL GDWR: Type: IMPLANTABLE DEVICE | Site: URETER | Status: FUNCTIONAL

## 2024-10-18 RX ORDER — DIPHENHYDRAMINE HYDROCHLORIDE 50 MG/ML
12.5 INJECTION INTRAMUSCULAR; INTRAVENOUS ONCE AS NEEDED
Status: DISCONTINUED | OUTPATIENT
Start: 2024-10-18 | End: 2024-10-18 | Stop reason: HOSPADM

## 2024-10-18 RX ORDER — MAGNESIUM HYDROXIDE 1200 MG/15ML
LIQUID ORAL AS NEEDED
Status: DISCONTINUED | OUTPATIENT
Start: 2024-10-18 | End: 2024-10-18 | Stop reason: HOSPADM

## 2024-10-18 RX ORDER — ONDANSETRON 2 MG/ML
4 INJECTION INTRAMUSCULAR; INTRAVENOUS EVERY 4 HOURS PRN
Status: DISCONTINUED | OUTPATIENT
Start: 2024-10-18 | End: 2024-10-18 | Stop reason: HOSPADM

## 2024-10-18 RX ORDER — DEXAMETHASONE SODIUM PHOSPHATE 10 MG/ML
INJECTION, SOLUTION INTRAMUSCULAR; INTRAVENOUS AS NEEDED
Status: DISCONTINUED | OUTPATIENT
Start: 2024-10-18 | End: 2024-10-18

## 2024-10-18 RX ORDER — LIDOCAINE HYDROCHLORIDE 10 MG/ML
0.5 INJECTION, SOLUTION EPIDURAL; INFILTRATION; INTRACAUDAL; PERINEURAL ONCE AS NEEDED
Status: DISCONTINUED | OUTPATIENT
Start: 2024-10-18 | End: 2024-10-18 | Stop reason: HOSPADM

## 2024-10-18 RX ORDER — NAPROXEN 500 MG/1
500 TABLET ORAL 2 TIMES DAILY WITH MEALS
Qty: 10 TABLET | Refills: 0 | Status: SHIPPED | OUTPATIENT
Start: 2024-10-18 | End: 2024-10-23

## 2024-10-18 RX ORDER — HYDROMORPHONE HCL/PF 1 MG/ML
0.5 SYRINGE (ML) INJECTION EVERY 2 HOUR PRN
Status: DISCONTINUED | OUTPATIENT
Start: 2024-10-18 | End: 2024-10-18 | Stop reason: HOSPADM

## 2024-10-18 RX ORDER — CEFAZOLIN SODIUM 1 G/50ML
1000 SOLUTION INTRAVENOUS ONCE
Status: COMPLETED | OUTPATIENT
Start: 2024-10-18 | End: 2024-10-18

## 2024-10-18 RX ORDER — PROPOFOL 10 MG/ML
INJECTION, EMULSION INTRAVENOUS AS NEEDED
Status: DISCONTINUED | OUTPATIENT
Start: 2024-10-18 | End: 2024-10-18

## 2024-10-18 RX ORDER — TAMSULOSIN HYDROCHLORIDE 0.4 MG/1
0.4 CAPSULE ORAL
Qty: 15 CAPSULE | Refills: 0 | Status: SHIPPED | OUTPATIENT
Start: 2024-10-18

## 2024-10-18 RX ORDER — MIDAZOLAM HYDROCHLORIDE 2 MG/2ML
INJECTION, SOLUTION INTRAMUSCULAR; INTRAVENOUS AS NEEDED
Status: DISCONTINUED | OUTPATIENT
Start: 2024-10-18 | End: 2024-10-18

## 2024-10-18 RX ORDER — OXYCODONE HYDROCHLORIDE 5 MG/1
5 TABLET ORAL EVERY 4 HOURS PRN
Qty: 5 TABLET | Refills: 0 | Status: SHIPPED | OUTPATIENT
Start: 2024-10-18 | End: 2024-10-23

## 2024-10-18 RX ORDER — LIDOCAINE HYDROCHLORIDE 10 MG/ML
INJECTION, SOLUTION EPIDURAL; INFILTRATION; INTRACAUDAL; PERINEURAL AS NEEDED
Status: DISCONTINUED | OUTPATIENT
Start: 2024-10-18 | End: 2024-10-18

## 2024-10-18 RX ORDER — ONDANSETRON 2 MG/ML
INJECTION INTRAMUSCULAR; INTRAVENOUS AS NEEDED
Status: DISCONTINUED | OUTPATIENT
Start: 2024-10-18 | End: 2024-10-18

## 2024-10-18 RX ORDER — OXYCODONE HYDROCHLORIDE 5 MG/1
10 TABLET ORAL EVERY 4 HOURS PRN
Status: DISCONTINUED | OUTPATIENT
Start: 2024-10-18 | End: 2024-10-18 | Stop reason: HOSPADM

## 2024-10-18 RX ORDER — FENTANYL CITRATE 50 UG/ML
INJECTION, SOLUTION INTRAMUSCULAR; INTRAVENOUS AS NEEDED
Status: DISCONTINUED | OUTPATIENT
Start: 2024-10-18 | End: 2024-10-18

## 2024-10-18 RX ORDER — ACETAMINOPHEN 325 MG/1
650 TABLET ORAL EVERY 4 HOURS PRN
Start: 2024-10-18

## 2024-10-18 RX ORDER — SODIUM CHLORIDE, SODIUM LACTATE, POTASSIUM CHLORIDE, CALCIUM CHLORIDE 600; 310; 30; 20 MG/100ML; MG/100ML; MG/100ML; MG/100ML
INJECTION, SOLUTION INTRAVENOUS CONTINUOUS PRN
Status: DISCONTINUED | OUTPATIENT
Start: 2024-10-18 | End: 2024-10-18

## 2024-10-18 RX ORDER — SODIUM CHLORIDE, SODIUM LACTATE, POTASSIUM CHLORIDE, CALCIUM CHLORIDE 600; 310; 30; 20 MG/100ML; MG/100ML; MG/100ML; MG/100ML
125 INJECTION, SOLUTION INTRAVENOUS CONTINUOUS
Status: DISCONTINUED | OUTPATIENT
Start: 2024-10-18 | End: 2024-10-18 | Stop reason: HOSPADM

## 2024-10-18 RX ORDER — DOCUSATE SODIUM 100 MG/1
100 CAPSULE, LIQUID FILLED ORAL 2 TIMES DAILY
Qty: 30 CAPSULE | Refills: 0 | Status: SHIPPED | OUTPATIENT
Start: 2024-10-18 | End: 2024-11-02

## 2024-10-18 RX ORDER — OXYCODONE HYDROCHLORIDE 5 MG/1
5 TABLET ORAL EVERY 4 HOURS PRN
Status: DISCONTINUED | OUTPATIENT
Start: 2024-10-18 | End: 2024-10-18 | Stop reason: HOSPADM

## 2024-10-18 RX ORDER — PHENAZOPYRIDINE HYDROCHLORIDE 200 MG/1
200 TABLET, FILM COATED ORAL 3 TIMES DAILY PRN
Qty: 10 TABLET | Refills: 0 | Status: SHIPPED | OUTPATIENT
Start: 2024-10-18 | End: 2024-10-21

## 2024-10-18 RX ORDER — OXYBUTYNIN CHLORIDE 5 MG/1
5 TABLET ORAL EVERY 6 HOURS PRN
Qty: 30 TABLET | Refills: 0 | Status: SHIPPED | OUTPATIENT
Start: 2024-10-18

## 2024-10-18 RX ORDER — FENTANYL CITRATE/PF 50 MCG/ML
25 SYRINGE (ML) INJECTION
Status: COMPLETED | OUTPATIENT
Start: 2024-10-18 | End: 2024-10-18

## 2024-10-18 RX ADMIN — FENTANYL CITRATE 25 MCG: 50 INJECTION INTRAMUSCULAR; INTRAVENOUS at 10:22

## 2024-10-18 RX ADMIN — PROPOFOL 200 MG: 10 INJECTION, EMULSION INTRAVENOUS at 09:28

## 2024-10-18 RX ADMIN — FENTANYL CITRATE 25 MCG: 50 INJECTION INTRAMUSCULAR; INTRAVENOUS at 09:35

## 2024-10-18 RX ADMIN — ONDANSETRON 4 MG: 2 INJECTION INTRAMUSCULAR; INTRAVENOUS at 09:22

## 2024-10-18 RX ADMIN — IOHEXOL 0.5 ML: 240 INJECTION, SOLUTION INTRATHECAL; INTRAVASCULAR; INTRAVENOUS; ORAL at 10:56

## 2024-10-18 RX ADMIN — SODIUM CHLORIDE, SODIUM LACTATE, POTASSIUM CHLORIDE, AND CALCIUM CHLORIDE: .6; .31; .03; .02 INJECTION, SOLUTION INTRAVENOUS at 09:15

## 2024-10-18 RX ADMIN — FENTANYL CITRATE 50 MCG: 50 INJECTION INTRAMUSCULAR; INTRAVENOUS at 09:57

## 2024-10-18 RX ADMIN — CEFAZOLIN SODIUM 1000 MG: 1 SOLUTION INTRAVENOUS at 09:26

## 2024-10-18 RX ADMIN — HYDROMORPHONE HYDROCHLORIDE 0.5 MG: 1 INJECTION, SOLUTION INTRAMUSCULAR; INTRAVENOUS; SUBCUTANEOUS at 11:43

## 2024-10-18 RX ADMIN — FENTANYL CITRATE 25 MCG: 50 INJECTION INTRAMUSCULAR; INTRAVENOUS at 10:34

## 2024-10-18 RX ADMIN — FENTANYL CITRATE 25 MCG: 50 INJECTION INTRAMUSCULAR; INTRAVENOUS at 10:09

## 2024-10-18 RX ADMIN — LIDOCAINE HYDROCHLORIDE 50 MG: 10 INJECTION, SOLUTION EPIDURAL; INFILTRATION; INTRACAUDAL at 09:28

## 2024-10-18 RX ADMIN — DEXAMETHASONE SODIUM PHOSPHATE 10 MG: 10 INJECTION, SOLUTION INTRAMUSCULAR; INTRAVENOUS at 09:35

## 2024-10-18 RX ADMIN — FENTANYL CITRATE 25 MCG: 50 INJECTION INTRAMUSCULAR; INTRAVENOUS at 11:03

## 2024-10-18 RX ADMIN — MIDAZOLAM 2 MG: 1 INJECTION INTRAMUSCULAR; INTRAVENOUS at 09:24

## 2024-10-18 RX ADMIN — FENTANYL CITRATE 25 MCG: 50 INJECTION INTRAMUSCULAR; INTRAVENOUS at 11:09

## 2024-10-18 RX ADMIN — SODIUM CHLORIDE, SODIUM LACTATE, POTASSIUM CHLORIDE, AND CALCIUM CHLORIDE: .6; .31; .03; .02 INJECTION, SOLUTION INTRAVENOUS at 09:20

## 2024-10-18 RX ADMIN — FENTANYL CITRATE 25 MCG: 50 INJECTION INTRAMUSCULAR; INTRAVENOUS at 11:20

## 2024-10-18 RX ADMIN — FENTANYL CITRATE 25 MCG: 50 INJECTION INTRAMUSCULAR; INTRAVENOUS at 11:25

## 2024-10-18 NOTE — DISCHARGE INSTR - AVS FIRST PAGE
Danielle Haque:    Your surgery went very well.      An additional segment of scar tissue was identified within your left ureter.  This was treated with a balloon dilation.  The right ureteral stent was also exchanged.    Plan will be to remove your left ureteral stent  after you recover from your upcoming colon surgery.      Please take your medications as prescribed with caution for comfort.  Most importantly please drink 6-8 glasses of water per day    Please call with any questions or concerns.    José Luis Stephens MD,PhD  [facility]  [phone]            WHAT IS A STENT?  At the end of the procedure, your doctor may place a stent into your ureter. A stent is a thin, flexible piece of plastic that will hold open your ureter while the remaining small pieces of stone pass. This allows your kidney to drain easily and prevents you from having to “pass” these small stone pieces on your own, which could be painful. The stent is about 12 inches long and looks and feels like a thin piece of spaghetti.    AFTER THE PROCEDURE  After the procedure you may experience the following symptoms. All of these are normal and should resolve within 1 or 2 days after your stent is removed.  Urinary frequency (urinating more often than usual)  Urinary urgency (the sensation that you need to urinate right away)  Painful urination (this can be pain in your bladder or in your back when  you urinate)  Blood in your urine ( a stent can irritate the lining of your bladder causing it to bleed)  Back/Flank pain, especially with urination  You will receive a prescription for narcotic pain medication after the procedure. You will also receive a prescription for tamsulosin which you will take once a day for 2 weeks to help relax your ureter and decrease stent discomfort. You will also need to purchase a stool softener (i.e. Colace) or mild laxative (i.e. Miralax) as the narcotic pain medication can make you constipated.  This is important as constipation can exacerbate stent related symptoms.

## 2024-10-18 NOTE — ANESTHESIA PREPROCEDURE EVALUATION
Procedure:  CYSTOSCOPY URETEROSCOPY WITH LITHOTRIPSY HOLMIUM LASER, treatment of LEFT ureteral stricture, RETROGRADE PYELOGRAM AND BILATERAL STENT EXCHANGE (Left: Bladder)  EXCHANGE STENT URETERAL (Bilateral: Ureter)    Mild MR reported    Relevant Problems   CARDIO   (+) Chest pain   (+) Hypertension   (+) Nonrheumatic mitral valve regurgitation      GI/HEPATIC   (+) Malignant neoplasm of sigmoid colon (HCC)   (+) Partial small bowel obstruction (HCC)   (+) Rectal cancer (HCC)      /RENAL   (+) THOR (acute kidney injury) (HCC)   (+) Bilateral hydronephrosis   (+) CKD stage G3a/A2, GFR 45-59 and albumin creatinine ratio  mg/g (HCC)   (+) 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      Anesthesia Plan  ASA Score- 2     Anesthesia Type- general with ASA Monitors.         Additional Monitors:     Airway Plan: LMA.    Comment: Risks of general anesthesia discussed including likely possibility of PONV and sore throat, as well as the rare possibilities of aspiration, dental/oropharyngeal/ocular injuries, or grave/life threatening anesthetic and surgical emergencies..       Plan Factors-Exercise tolerance (METS): >4 METS.    Chart reviewed.    Patient summary reviewed.      Patient instructed to abstain from smoking on day of procedure. Patient did not smoke on day of surgery.            Induction- intravenous.    Postoperative Plan- Plan for postoperative opioid use. Planned  trial extubation        Informed Consent- Anesthetic plan and risks discussed with patient.  I personally reviewed this patient with the CRNA. Discussed and agreed on the Anesthesia Plan with the CRNA..

## 2024-10-18 NOTE — H&P
UROLOGY HISTORY AND PHYSICAL     Patient Identifiers: Danielle Haque (MRN [id number])      Date of Service: 10/18/2024        ASSESSMENT:     58 y.o. old female with complex stone disease and bilateral ureteral stricture disease presents for ureteral stent exchange.      PLAN:     Bilateral ureteroscopy, stent exchange    Ultimate plan will be to rid the patient of all of her stone burden on the left side which I do believe is possible endoscopically.  She has been counseled previously that she will require ureteral reconstruction on the contralateral right side and I will use today's procedure to evaluate that ureter likely radiographically and place a stent prior to referral to reconstructive surgery.      History of Present Illness:     Danielle Haque is a 58 y.o. old with a history of ureteral strictures and complex stones    Past Medical, Past Surgical History:     Past Medical History:   Diagnosis Date    Bleeding from colostomy stoma (HCC)      states ileostomy , not colostomy    Cancer (HCC)     Colon cancer (HCC)     Elevated serum creatinine 09/11/2024    Fall     Headache     Hemochromatosis, unspecified     History of transfusion     2022 - no adverse reaction    Hypertension     Hypokalemia 09/12/2024    Kidney calculi     Kidney stone     Liver disease     hemangioma    Muscle weakness     Personal history of COVID-19 12/2022    Sarcoidosis     Seizures (HCC)     2022    Shingles    :    Past Surgical History:   Procedure Laterality Date    ABSCESS DRAINAGE      left breast    BREAST BIOPSY      CHEST TUBE INSERTION      COLON SURGERY      colostomy    COLONOSCOPY      FL GUIDED CENTRAL VENOUS ACCESS DEVICE INSERTION  03/21/2023    FL RETROGRADE PYELOGRAM  01/23/2023    FL RETROGRADE PYELOGRAM  04/03/2023    FL RETROGRADE PYELOGRAM  7/21/2023    FL RETROGRADE PYELOGRAM  10/11/2023    FL RETROGRADE PYELOGRAM  12/15/2023    FL RETROGRADE PYELOGRAM  2/8/2024    FL RETROGRADE PYELOGRAM  5/10/2024     FL RETROGRADE PYELOGRAM  9/26/2024    IR BIOPSY LIVER MASS  05/09/2023    LUNG BIOPSY      AR CYSTO BLADDER W/URETERAL CATHETERIZATION Bilateral 07/21/2023    Procedure: CYSTOSCOPY URETEROSCOPY RETROGRADE PYELOGRAM WITH BILATERAL STENT URETERAL EXCHANGE; LEFT LASER LITHOTRIPSY AND STONE BASKET RETRIEVAL;  Surgeon: José Luis Stephens MD;  [facility];  Service: Urology    AR CYSTO BLADDER W/URETERAL CATHETERIZATION Bilateral 12/15/2023    Procedure: CYSTOSCOPY, BILATERAL  RETROGRADE PYELOGRAM , STENT EXCHANGE RIGHT , LEFT URETEROSCOPY ,  HOLMIUM LASER WITH INSERTION LEFT URETERAL STENT;  Surgeon: Derick Bhakta MD;  [facility];  Service: Urology    AR CYSTO BLADDER W/URETERAL CATHETERIZATION Left 2/8/2024    Procedure: CYSTOSCOPY B/L RETROGRADE PYELOGRAM WITH B/L T URETERALSTENT EXCHANGE,LEFT URETEROSCOPY, DILATION LEFT URETERAL STICTURE;  Surgeon: José Luis Stephens MD;  [facility];  Service: Urology    AR CYSTO BLADDER W/URETERAL CATHETERIZATION Bilateral 5/10/2024    Procedure: CYSTOSCOPY RETROGRADE PYELOGRAM WITH INSERTION STENT URETERAL;  Surgeon: José Luis Stephens MD;  [facility];  Service: Urology    AR CYSTO W/INSERT URETERAL STENT Bilateral 5/10/2024    Procedure: EXCHANGE STENT URETERAL;  Surgeon: José Luis Stephens MD;  [facility];  Service: Urology    AR CYSTO/URETERO W/LITHOTRIPSY &INDWELL STENT INSRT Bilateral 01/23/2023    Procedure: CYSTOSCOPY  RIGHT URETEROSCOPY WITH LITHOTRIPSY HOLMIUM LASER, BILATERAL RETROGRADE PYELOGRAM AND BILATERAL  URETERAL STENT INSERTION;  Surgeon: José Luis Stephens MD;  [facility];  Service: Urology    AR CYSTO/URETERO W/LITHOTRIPSY &INDWELL STENT INSRT Right 04/03/2023    Procedure: RIGHT URETEROSCOPY WITH LITHOTRIPSY HOLMIUM LASER, RETROGRADE PYELOGRAM; BILATERAL EXCHANGE STENT URETERAL;  Surgeon: José Luis Stephens MD;  [facility];  Service: Urology    AR CYSTO/URETERO  "W/LITHOTRIPSY &INDWELL STENT INSRT Bilateral 10/11/2023    Procedure: CYSTOSCOPY URETEROSCOPY RETROGRADE PYELOGRAM AND INSERTION STENT URETERAL DIAGNOSTINC RIGHT URETEROSCOPY, REMOVAL STENT LEFT SIDE;  Surgeon: José Luis Stephens MD;  [facility];  Service: Urology    RI CYSTO/URETERO W/LITHOTRIPSY &INDWELL STENT INSRT Bilateral 9/26/2024    Procedure: CYSTOSCOPY ,LEFT  URETEROSCOPY,  BILATERAL  RETROGRADE PYELOGRAM AND BILATERAL URETERAL STENT EXCHANGE;  Surgeon: Favian Barber MD;  [facility];  Service: Urology    RI INSJ TUNNELED CTR VAD W/SUBQ PORT AGE 5 YR/> N/A 03/21/2023    Procedure: INSERTION VENOUS PORT ( PORT-A-CATH) IR;  Surgeon: Mark Amos DO;  [facility]: [facility];  Service: Interventional Radiology    RI LAPS COLECTOMY PRTL W/COLOPXTSTMY LW ANAST N/A 8/17/2023    Procedure: RESECTION COLON LOW ANTERIOR LAPAROSCOPIC WITH ROBOTICS;  Surgeon: Sree Umanzor MD;  [facility];  Service: Colorectal    TONSILLECTOMY      URINARY SURGERY Bilateral     bilateral stents   :    Medications, Allergies:     Current Facility-Administered Medications:     ceFAZolin (ANCEF) IVPB (premix in dextrose) 1,000 mg 50 mL, 1,000 mg, Intravenous, Once, José Luis Stephens MD    lactated ringers infusion, 125 mL/hr, Intravenous, Continuous, Jose Maria Pimentel MD    lidocaine (PF) (XYLOCAINE-MPF) 1 % injection 0.5 mL, 0.5 mL, Infiltration, Once PRN, Jose Maria Pimentel MD    Allergies:  Allergies   Allergen Reactions    Oxaliplatin Shortness Of Breath     Reactions occurred with 2nd and 3rd infusions (about 1-3 hours from initiation of infusion) and required treatment with steroids and antihistamines. Please refer to allergy note on 2/7/2023 for detailed description of her reactions.    Morphine Nausea Only     \"Every dose made me nauseous\" (oral dosing only, can tolerate IV morphine)    Potassium Chloride Other (See Comments)     Pt reports \"burning\" with Iv potassium " "administration and wishes for it to be added to chart   :    Social and Family History:   Social History:   Social History     Tobacco Use    Smoking status: Never     Passive exposure: Past    Smokeless tobacco: Never   Vaping Use    Vaping status: Never Used   Substance Use Topics    Alcohol use: Never    Drug use: Never   .    Social History     Tobacco Use   Smoking Status Never    Passive exposure: Past   Smokeless Tobacco Never       Family History:  Family History   Problem Relation Age of Onset    Cancer Mother     Cirrhosis Father     Breast cancer Neg Hx     Breast cancer additional onset Neg Hx    :     Review of Systems:     General: Fever, chills, or night sweats: negative  Cardiac: Negative for chest pain.    Pulmonary: Negative for shortness of breath.  Gastrointestinal: Abdominal pain negative  Nausea, vomiting, or diarrhea negative  Genitourinary: See HPI above.  Patient does nothave hematuria.  All other systems queried were negative.    Physical Exam:   General: Patient is pleasant and in NAD. Awake and alert  /56   Pulse 97   Temp 98.6 °F (37 °C) (Temporal)   Resp 18   Ht 5' 2\" (1.575 m)   Wt 54 kg (119 lb)   SpO2 100%   BMI 21.77 kg/m²   HEENT:  Normocephalic atraumatic  Cardiac:  Regular rate and rhythm, Peripheral edema: negative  Pulmonary: Non-labored breathing, CTAB  Abdomen: Soft, non-tender, non-distended.  No surgical scars.  No masses, tenderness, hernias noted.    Genitourinary: negative CVA tenderness, neg suprapubic tenderness.  Extremities: normal movement in all 4       Labs:     Lab Results   Component Value Date    HGB 9.9 (L) 09/27/2024    HCT 30.0 (L) 09/27/2024    WBC 9.59 09/27/2024     09/27/2024   ]    Lab Results   Component Value Date    K 3.7 09/27/2024     09/27/2024    CO2 26 09/27/2024    BUN 31 (H) 09/27/2024    CREATININE 0.76 09/27/2024    CALCIUM 9.0 09/27/2024   ]    Imaging:   I personally reviewed the images and report of the following " studies, and reviewed them with the patient:        Thank you for allowing me to participate in this patients’ care.  Please do not hesitate to call with any additional questions.  José Luis Stephens MD

## 2024-10-18 NOTE — OP NOTE
Operative Note     PATIENT:  Danielle Haque (MRN [id number])    DATE OF PROCEDURE:   10/18/2024    PRE-OP DIAGNOSIS:   1) bilateral nephrolithiasis  #2 right ureteral stricture  #3 bilateral ureteral stents  #4 rectal cancer status post surgical resection    POST-OP DIAGNOSIS:   1) bilateral nephrolithiasis  #2  Bilateral ureteral stricture  #3 bilateral ureteral stents  #4 rectal cancer status post surgical resection    PROCEDURES PERFORMED:  1) Cystoscopy  2) bilateral retrograde pyelography with fluoroscopic interpretation  3) Left ureteroscopy with treatment of ureteral stricture  4) bilateral ureteral stent exchange    SURGEON:  José Luis Stephens MD    NOTE:  There were no qualified teaching residents to assist with this case    ANESTHESIA: General/LMA     COMPLICATIONS:   None    ANTIBIOTICS:  Ancef    INTRAOPERATIVE THROMBOEMBOLISM PROPHYLAXIS:  Pneumatic compression stockings     FINDINGS:  -Significant stenosis is identified within the left mid ureter.  Approximately 3 cm area of significant stricture disease is present within the mid ureter.  Initially this measured approximately 5 Chilean.  Ultimately I chose to perform a balloon dilation using an 18 Chilean by 4 cm balloon.  Following this maneuver, I was able to advance a 10/12 Chilean ureteral access sheath indicating good capacity of the ureter.  Retrograde pyelogram demonstrates no extravasation following the ureteral dilation  - The right ureteral stent was exchanged in anticipation of a future reconstructive procedure.      PROCEDURE IN DETAIL:   The patient was identified and brought to the OR.  Antibiotic prophylaxis and DVT prophylaxis were administered.  They were placed in the comfortable dorsal lithotomy position with care to pad all pressure points.  They were prepped and draped in the usual sterile fashion using hibiclens.  A surgical time out was performed with all in the room in agreement with the correct patient, procedure, indications,  and laterality.  A 21-Guamanian rigid cystoscope was used to enter the bladder.  The bladder was inspected in its entirety and there were no lesions noted.  The ureteral orifices were identified in their normal orthotopic positions.     The Left ureteral orifice was identified and a 5 Fr open ended catheter was placed into the ureteral orifice alongside the preplaced ureteral stent which was then removed.   A retrograde pyelogram was performed with the findings as described above.  A Sensor wire was advanced up to the kidney under fluoroscopic guidance.  Leaving this safety wire in place, the bladder was drained.      Significant ureteral stricture is present.  I initially chose to dilate using a 10 Guamanian obturator however the stricture was too stenotic to allow combination of this.  As such I felt the only option to address the stricture and evaluate for any more proximal stones was to perform a balloon dilation.  I utilized a retrograde pyelogram to delineate the length and location of the stricture and chose a 4 Guamanian by 18 Guamanian ureteral dilating balloon.  This is positioned fluoroscopically and dilated to 18 roxanne and allowed to remain dilated for 3 minutes prior to desufflated    I then evaluate the ureter in this location using a retrograde pyelogram which is negative for any extravasation.  I then passed a 10 Guamanian and ultimately a 12 Guamanian ureteral access sheath with no resistance noted and advanced this to the renal pelvis      Introduced a flexible ureteroscope and performed diagnostic ureteroscopy to evaluate for any residual stone disease in this kidney.  The entire renal pelvis and all calyceal systems and length of the ureter inspected and no residual stones are found.  Exit ureteroscopy demonstrates findings consistent with appropriate dilation of the ureteral stricture with no evidence of injury to the ureter.    A JJ stent was then passed up the wire  under fluoroscopic guidance into the kidney  with a good curl noted in the kidney and in the bladder.   . The bladder was drained.      I also  exchanged her right ureteral stent today.    The patient was placed back supine, awakened from general anesthesia and brought to recovery room in stable condition.      ESTIMATED BLOOD LOSS:  Minimal      SPECIMENS:   No specimens collected during this procedure.     IMPLANTS:   Implant Name Type Inv. Item Serial No.  Lot No. LRB No. Used Action   STENT URETERAL 6FR 22CM INLAY OPTIMA W/O GW - [id number]  STENT URETERAL 6FR 22CM INLAY OPTIMA W/O GW  [facility] [employer] [id number] Right 1 Explanted   STENT URETERAL 6FR 22CM INLAY OPTIMA W/NITINOL GDWR - [id number]  STENT URETERAL 6FR 22CM INLAY OPTIMA W/NITINOL GDWR  [facility] [employer] [id number] Right 1 Implanted   STENT URETERAL 6FR 24CML INLAY OPTIMA - [id number]  STENT URETERAL 6FR 24CML INLAY OPTIMA  [facility] [employer] [id number] Left 1 Explanted   STENT URETERAL 7FR 24CM INLAY OPTIMA W/HYDROGLIDE GDWR - [id number]  STENT URETERAL 7FR 24CM INLAY OPTIMA W/HYDROGLIDE GDWR  [facility] [employer] [id number] Left 1 Implanted        COMPLICATIONS: None    DISPOSITION: PACU    PLAN:  -Patient was counseled we will offer her trial without her ureteral stent in approximately 6 weeks  - Patient is pending a reversal of her ileostomy.  I assume that left ureteral stent placement will be required for this procedure and as such I will plan to remove her left ureteral stent after that procedure  - She will require a ureteral as previously discussed she will require a referral to reconstructive urology to address her recalcitrant right proximal ureteral stricture and this will be pursued after she recovers from her upcoming ileostomy reversal.

## 2024-10-18 NOTE — ANESTHESIA POSTPROCEDURE EVALUATION
Post-Op Assessment Note    CV Status:  Stable  Pain Score: 0    Pain management: adequate       Mental Status:  Alert and awake   Hydration Status:  Euvolemic   PONV Controlled:  Controlled   Airway Patency:  Patent     Post Op Vitals Reviewed: Yes    No anethesia notable event occurred.    Staff: CRNA           Last Filed PACU Vitals:  Vitals Value Taken Time   Temp     Pulse 105    /90    Resp 18    SpO2 100        Modified Say:  No data recorded

## 2024-10-19 NOTE — ANESTHESIA POSTPROCEDURE EVALUATION
Post-Op Assessment Note    CV Status:  Stable  Pain Score: 0    Pain management: adequate       Mental Status:  Awake and sleepy   Hydration Status:  Stable   PONV Controlled:  None   Airway Patency:  Patent     Post Op Vitals Reviewed: Yes    No anethesia notable event occurred.    Staff: Anesthesiologist           Last Filed PACU Vitals:  Vitals Value Taken Time   Temp 97.5 °F (36.4 °C) 10/18/24 1130   Pulse 100 10/18/24 1132   /73 10/18/24 1130   Resp 16 10/18/24 1130   SpO2 94 % 10/18/24 1132   Vitals shown include unfiled device data.    Modified Say:  Activity: 2 (10/18/2024 12:30 PM)  Respiration: 2 (10/18/2024 12:30 PM)  Circulation: 2 (10/18/2024 12:30 PM)  Consciousness: 2 (10/18/2024 12:30 PM)  Oxygen Saturation: 2 (10/18/2024 12:30 PM)  Modified Say Score: 10 (10/18/2024 12:30 PM)

## 2024-10-21 ENCOUNTER — OFFICE VISIT (OUTPATIENT)
Dept: HEMATOLOGY ONCOLOGY | Facility: CLINIC | Age: 59
End: 2024-10-21
Payer: COMMERCIAL

## 2024-10-21 VITALS
BODY MASS INDEX: 22.13 KG/M2 | WEIGHT: 121 LBS | SYSTOLIC BLOOD PRESSURE: 110 MMHG | DIASTOLIC BLOOD PRESSURE: 60 MMHG | HEART RATE: 105 BPM | OXYGEN SATURATION: 98 % | TEMPERATURE: 98.5 F

## 2024-10-21 DIAGNOSIS — D64.9 ANEMIA, UNSPECIFIED TYPE: ICD-10-CM

## 2024-10-21 DIAGNOSIS — C20 RECTAL CANCER (HCC): ICD-10-CM

## 2024-10-21 DIAGNOSIS — C18.7 MALIGNANT NEOPLASM OF SIGMOID COLON (HCC): Primary | ICD-10-CM

## 2024-10-21 PROCEDURE — 99213 OFFICE O/P EST LOW 20 MIN: CPT | Performed by: INTERNAL MEDICINE

## 2024-10-21 NOTE — PROGRESS NOTES
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  From 2022 to May 2023, the patient received total neoadjuvant therapy (MIRI) for her moderately differentiated colorectal cancer.  On 2023, she underwent R0 LAR.  Pathology showed ypT2 N0 moderately differentiated adenocarcinoma of the rectum.  Subsequently, she initiated surveillance.  Her most recent contrast-enhanced CT scan of the chest, abdomen and pelvis on 2024, showed numerous bilateral pulmonary nodules, many of which are cavitary, unchanged from previous examination. Stable 1.3 cm irregular nodular opacity in the lingula was identified. There were no new or enlarging nodules. There was no tracheal or endobronchial lesion.  Identified lung abnormalities in CT scan of the chest occur in the setting of a patient with a well-established diagnosis of sarcoidosis.      Orders:    CT chest w contrast; Future    CT abdomen pelvis w contrast; Future    Basic metabolic panel; Future    CBC and differential; Future    Plan:  Continue surveillance. Contrast enhanced CT scan of the chest, abdomen, and pelvis in 6 months   RTC in 6 months       Patient understands and agrees with my management recommendations and plan of care. I answered questions to her satisfaction.    Rectal cancer (HCC)  See above discussion, under \" Malignant Neoplasm of Sigmoid Colon\"      Orders:    CT chest w contrast; Future    CT abdomen pelvis w contrast; Future    Basic metabolic panel; Future    CBC and differential; Future    Anemia, " unspecified type  Likely, the patient has anemia of chronic disease.  She will have CBC plus differential in 6 months.  If she has progression of her anemia, she may require additional diagnostic workup.        The patient understands and agrees with my management recommendations and plan of care. I answered questions to her satisfaction.      History of Present Illness     Danielle Haque is a 58 y.o. female who presents to medical oncology clinic for surveillance of her stage IIA invasive moderately differentiated adenocarcinoma of the upper rectum and lower sigmoid colon, MMR proficient tumor.  Currently she does not have clinical, laboratory, or imaging evidence of recurrent or metastatic disease. She is BYRON.  See below summary of clinical course of colorectal adenocarcinoma for details:    Summary of clinical course colorectal adenocarcinoma, MMR proficient tumor:  October 28, 2022 biopsy of colorectal mass showed adenocarcinoma arising in the setting of a tubular adenoma, MMR proficient tumor  December 1, 2022, the patient underwent laparoscopic diverting colostomy with liver biopsy.  No tissue diagnosis or evidence of liver metastatic disease  Late 2022 to January 10, 2023 neoadjuvant (induction) capecitabine plus oxaliplatin chemotherapy.  [place], [place]  April 17, 2023 to May 25, 2023, the patient received neoadjuvant (induction) concurrent chemoradiotherapy with infusional 5-fluorouracil to the primary colorectal tumor and regional lymph nodes  May 9, 2023 image guided(CT scan) core needle biopsy of the liver did not show metastases  August 17, 2023 the patient underwent R0 low anterior resection (LAR). Pathology showed showed residual adenocarcinoma arising in a tubular adenoma (2.5cm). Seventeen (17) lymph nodes negative for carcinoma (0/17) - Margins negative for carcinoma R0 resection, ypT2N0     Interim History: Today, Mrs. Haque does not have new systemic, cardiorespiratory,  gastrointestinal, genitourinary, gynecological, dermatological, muscle skeletal, or systemic symptoms.  He has a colostomy in place.  Her most recent contrast-enhanced CT scan of the chest, abdomen and pelvis on October 2, 2024 showed Numerous bilateral pulmonary nodules, many of which are cavitary, unchanged from previous examination. Stable 1.3 cm irregular nodular opacity in the lingula. There are no new or enlarging nodules. No tracheal or endobronchial lesion.      Oncology History   Malignant neoplasm of sigmoid colon (HCC)   10/28/2022 Biopsy    Colon, Rectosigmoid Colon Mass Biopsy (1 slide [id number] [facility], collected 10/28/2022):  - Adenocarcinoma arising in a tubular adenoma     12/1/2022 Biopsy    DIAGNOSIS LIVER, SEGMENT 3, RESECTION:  - HYALINIZED SUBCAPSULAR AND INTRA-PARENCHYMAL NODULES. NO MALIGNANCY SEEN. SEE NOTE  - MILD MACROVESICULAR STEATOSIS (25%). NO EVIDENCE OF STEATOHEPATITIS. TRICHOME STAIN SHOWS MILD PORTAL FIBROSIS.   - 2+ IRON DEPOSITION WITHIN KUPFFER CELLS (IRON STAIN), CONSISTENT WITH SECONDARY HEMOCHROMATOSIS.     NOTE: THIS LIKELY REPRESENTS A MARKEDLY SCLEROTIC HEMANGIOMA OR CHANGES SECONDARY TO INJURY.               12/1/2022 Surgery    Laparoscopic diverting colostomy with liver biopsy. Dr. Serrano.      12/25/2022 Initial Diagnosis    Malignant neoplasm of sigmoid colon (HCC)     2022 - 1/10/2023 Chemotherapy    Oxaliplatin. [place], [place]     2/24/2023 -  Cancer Staged    Staging form: Colon and Rectum, AJCC 8th Edition  - Clinical: Stage IIA (cT3, cN0, cM0) - Signed by Edouard Ferrera MD on 2/24/2023  Total positive nodes: 0       4/17/2023 - 5/27/2023 Chemotherapy    potassium chloride, 20 mEq, Intravenous, Once, 3 of 3 cycles  Administration: 20 mEq (4/17/2023), 20 mEq (4/24/2023), 20 mEq (5/1/2023)  alteplase (CATHFLO), 2 mg, Intracatheter, Every 1 Minute as needed, 6 of 6 cycles  fluorouracil (ADRUCIL) ambulatory infusion Soln, 225  mg/m2/day = 1,880 mg, Intravenous, Over 120 hours, 6 of 6 cycles     4/17/2023 - 5/25/2023 Radiation    Treatments:  Course: C1    Plan ID Energy Fractions Dose per Fraction (cGy) Dose Correction (cGy) Total Dose Delivered (cGy) Elapsed Days   CD Rectum 6X 3 / 3 180 0 540 2   Whole Pelvis 6X 25 / 25 180 0 4,500 35      Treatment Dates:  4/17/2023 - 5/25/2023.      Rectal cancer (HCC)   7/14/2023 Initial Diagnosis    Rectal cancer (HCC)     9/6/2023 -  Cancer Staged    Staging form: Colon and Rectum, AJCC 8th Edition  - Clinical: Stage IIA (cT3, cN0, cM0) - Signed by Edouard Ferrera MD on 9/6/2023  Total positive nodes: 0             Review of Systems   Constitutional:  Negative for activity change, appetite change, chills, diaphoresis, fatigue, fever and unexpected weight change.   HENT:  Negative for congestion, dental problem, ear discharge, ear pain, facial swelling, hearing loss, mouth sores, nosebleeds, postnasal drip, rhinorrhea, sinus pain, sneezing, sore throat, tinnitus, trouble swallowing and voice change.    Eyes:  Negative for photophobia, pain, discharge, redness, itching and visual disturbance.   Respiratory:  Negative for apnea, cough, choking, chest tightness, shortness of breath, wheezing and stridor.    Cardiovascular:  Negative for chest pain, palpitations and leg swelling.   Gastrointestinal:  Negative for abdominal distention, abdominal pain, anal bleeding, blood in stool, constipation, diarrhea, nausea, rectal pain and vomiting.   Endocrine: Negative for cold intolerance and heat intolerance.   Genitourinary:  Negative for decreased urine volume, difficulty urinating, dysuria, enuresis, flank pain, frequency, hematuria and urgency.   Musculoskeletal:  Negative for arthralgias, back pain, gait problem, joint swelling, myalgias, neck pain and neck stiffness.   Skin:  Negative for color change, pallor, rash and wound.   Neurological:  Negative for dizziness, tremors, seizures, syncope,  facial asymmetry, speech difficulty, weakness, light-headedness, numbness and headaches.   Hematological:  Negative for adenopathy. Does not bruise/bleed easily.   Psychiatric/Behavioral:  Negative for behavioral problems, confusion, dysphoric mood and sleep disturbance. The patient is not nervous/anxious.            Objective     There were no vitals taken for this visit.    Physical Exam  Vitals reviewed.   Constitutional:       General: She is not in acute distress.     Appearance: Normal appearance. She is normal weight. She is not toxic-appearing.      Comments: Middle-age female patient well-developed, well-nourished without acute respiratory distress.   HENT:      Head: Normocephalic and atraumatic.      Nose: Nose normal. No congestion.      Mouth/Throat:      Mouth: Mucous membranes are moist.      Pharynx: Oropharynx is clear. No oropharyngeal exudate or posterior oropharyngeal erythema.   Eyes:      General: No scleral icterus.     Extraocular Movements: Extraocular movements intact.      Conjunctiva/sclera: Conjunctivae normal.      Pupils: Pupils are equal, round, and reactive to light.   Cardiovascular:      Rate and Rhythm: Normal rate and regular rhythm.      Pulses: Normal pulses.      Heart sounds: Normal heart sounds. No murmur heard.     No friction rub. No gallop.   Pulmonary:      Effort: Pulmonary effort is normal. No respiratory distress.      Breath sounds: Normal breath sounds. No stridor. No wheezing, rhonchi or rales.   Chest:      Chest wall: No tenderness.   Abdominal:      General: Bowel sounds are normal. There is no distension.      Palpations: Abdomen is soft. There is no mass.      Tenderness: There is no abdominal tenderness. There is no right CVA tenderness, left CVA tenderness, guarding or rebound.      Hernia: No hernia is present.      Comments: Colostomy in place.  No inflammatory changes in the skin surrounding the colostomy.   Musculoskeletal:         General: No swelling,  tenderness or deformity. Normal range of motion.      Cervical back: Normal range of motion and neck supple. No rigidity or tenderness.      Right lower leg: No edema.      Left lower leg: No edema.   Lymphadenopathy:      Cervical: No cervical adenopathy.   Skin:     General: Skin is warm and dry.      Capillary Refill: Capillary refill takes less than 2 seconds.      Coloration: Skin is not jaundiced or pale.      Findings: No bruising, erythema, lesion or rash.   Neurological:      General: No focal deficit present.      Mental Status: She is alert and oriented to person, place, and time. Mental status is at baseline.      Cranial Nerves: No cranial nerve deficit.      Sensory: No sensory deficit.      Motor: No weakness.      Coordination: Coordination normal.      Gait: Gait normal.      Deep Tendon Reflexes: Reflexes normal.   Psychiatric:         Mood and Affect: Mood normal.         Behavior: Behavior normal.         Thought Content: Thought content normal.

## 2024-10-21 NOTE — ASSESSMENT & PLAN NOTE
"See above discussion, under \" Malignant Neoplasm of Sigmoid Colon\"      Orders:    CT chest w contrast; Future    CT abdomen pelvis w contrast; Future    Basic metabolic panel; Future    CBC and differential; Future    "

## 2024-10-21 NOTE — ASSESSMENT & PLAN NOTE
58-year-old female patient with stage IIA invasive moderately differentiated adenocarcinoma involving the upper rectum and lower sigmoid colon, initially diagnosed on October 28, 2022.  From December 2022 to May 2023, the patient received total neoadjuvant therapy (MIRI) for her moderately differentiated colorectal cancer.  On August 17, 2023, she underwent R0 LAR.  Pathology showed ypT2 N0 moderately differentiated adenocarcinoma of the rectum.  Subsequently, she initiated surveillance.  Her most recent contrast-enhanced CT scan of the chest, abdomen and pelvis on October 2, 2024, showed numerous bilateral pulmonary nodules, many of which are cavitary, unchanged from previous examination. Stable 1.3 cm irregular nodular opacity in the lingula was identified. There were no new or enlarging nodules. There was no tracheal or endobronchial lesion.  Identified lung abnormalities in CT scan of the chest occur in the setting of a patient with a well-established diagnosis of sarcoidosis.      Orders:    CT chest w contrast; Future    CT abdomen pelvis w contrast; Future    Basic metabolic panel; Future    CBC and differential; Future    Plan:  Continue surveillance. Contrast enhanced CT scan of the chest, abdomen, and pelvis in 6 months   RTC in 6 months       Patient understands and agrees with my management recommendations and plan of care. I answered questions to her satisfaction.

## 2024-10-24 DIAGNOSIS — I10 PRIMARY HYPERTENSION: ICD-10-CM

## 2024-10-24 PROBLEM — N39.0 UTI (URINARY TRACT INFECTION): Status: RESOLVED | Noted: 2024-09-24 | Resolved: 2024-10-24

## 2024-10-24 RX ORDER — AMLODIPINE BESYLATE 5 MG/1
5 TABLET ORAL DAILY
Qty: 30 TABLET | Refills: 0 | Status: SHIPPED | OUTPATIENT
Start: 2024-10-24

## 2024-10-29 ENCOUNTER — TELEPHONE (OUTPATIENT)
Dept: NEPHROLOGY | Facility: CLINIC | Age: 59
End: 2024-10-29

## 2024-10-30 ENCOUNTER — HOSPITAL ENCOUNTER (OUTPATIENT)
Dept: INFUSION CENTER | Facility: CLINIC | Age: 59
Discharge: HOME | End: 2024-10-30
Payer: COMMERCIAL

## 2024-10-30 DIAGNOSIS — D86.9 SARCOIDOSIS: ICD-10-CM

## 2024-10-30 DIAGNOSIS — Z95.828 PORT-A-CATH IN PLACE: Primary | ICD-10-CM

## 2024-10-30 LAB
ALBUMIN SERPL BCG-MCNC: 3.7 G/DL (ref 3.5–5)
ALP SERPL-CCNC: 121 U/L (ref 34–104)
ALT SERPL W P-5'-P-CCNC: 33 U/L (ref 7–52)
ANION GAP SERPL CALCULATED.3IONS-SCNC: 6 MMOL/L (ref 4–13)
AST SERPL W P-5'-P-CCNC: 45 U/L (ref 13–39)
BASOPHILS # BLD AUTO: 0.01 THOUSANDS/ΜL (ref 0–0.1)
BASOPHILS NFR BLD AUTO: 0 % (ref 0–1)
BILIRUB SERPL-MCNC: 0.66 MG/DL (ref 0.2–1)
BUN SERPL-MCNC: 17 MG/DL (ref 5–25)
CA-I BLD-SCNC: 1.36 MMOL/L (ref 1.12–1.32)
CALCIUM SERPL-MCNC: 10.6 MG/DL (ref 8.4–10.2)
CHLORIDE SERPL-SCNC: 105 MMOL/L (ref 96–108)
CO2 SERPL-SCNC: 25 MMOL/L (ref 21–32)
CREAT SERPL-MCNC: 1.08 MG/DL (ref 0.6–1.3)
CRP SERPL QL: 13.9 MG/L
EOSINOPHIL # BLD AUTO: 0.18 THOUSAND/ΜL (ref 0–0.61)
EOSINOPHIL NFR BLD AUTO: 5 % (ref 0–6)
ERYTHROCYTE [DISTWIDTH] IN BLOOD BY AUTOMATED COUNT: 14.8 % (ref 11.6–15.1)
ERYTHROCYTE [SEDIMENTATION RATE] IN BLOOD: 40 MM/HOUR (ref 0–29)
GFR SERPL CREATININE-BSD FRML MDRD: 57 ML/MIN/1.73SQ M
GLUCOSE SERPL-MCNC: 89 MG/DL (ref 65–140)
HCT VFR BLD AUTO: 28.5 % (ref 34.8–46.1)
HGB BLD-MCNC: 9.2 G/DL (ref 11.5–15.4)
IMM GRANULOCYTES # BLD AUTO: 0.02 THOUSAND/UL (ref 0–0.2)
IMM GRANULOCYTES NFR BLD AUTO: 1 % (ref 0–2)
LYMPHOCYTES # BLD AUTO: 0.34 THOUSANDS/ΜL (ref 0.6–4.47)
LYMPHOCYTES NFR BLD AUTO: 9 % (ref 14–44)
MAGNESIUM SERPL-MCNC: 1.7 MG/DL (ref 1.9–2.7)
MCH RBC QN AUTO: 29.4 PG (ref 26.8–34.3)
MCHC RBC AUTO-ENTMCNC: 32.3 G/DL (ref 31.4–37.4)
MCV RBC AUTO: 91 FL (ref 82–98)
MONOCYTES # BLD AUTO: 0.38 THOUSAND/ΜL (ref 0.17–1.22)
MONOCYTES NFR BLD AUTO: 10 % (ref 4–12)
NEUTROPHILS # BLD AUTO: 2.91 THOUSANDS/ΜL (ref 1.85–7.62)
NEUTS SEG NFR BLD AUTO: 75 % (ref 43–75)
NRBC BLD AUTO-RTO: 0 /100 WBCS
PLATELET # BLD AUTO: 249 THOUSANDS/UL (ref 149–390)
PMV BLD AUTO: 9.4 FL (ref 8.9–12.7)
POTASSIUM SERPL-SCNC: 4.2 MMOL/L (ref 3.5–5.3)
PROT SERPL-MCNC: 6.8 G/DL (ref 6.4–8.4)
RBC # BLD AUTO: 3.13 MILLION/UL (ref 3.81–5.12)
SODIUM SERPL-SCNC: 136 MMOL/L (ref 135–147)
WBC # BLD AUTO: 3.84 THOUSAND/UL (ref 4.31–10.16)

## 2024-10-30 PROCEDURE — 85652 RBC SED RATE AUTOMATED: CPT

## 2024-10-30 PROCEDURE — 80053 COMPREHEN METABOLIC PANEL: CPT

## 2024-10-30 PROCEDURE — 86140 C-REACTIVE PROTEIN: CPT

## 2024-10-30 PROCEDURE — 85025 COMPLETE CBC W/AUTO DIFF WBC: CPT

## 2024-10-30 PROCEDURE — 82164 ANGIOTENSIN I ENZYME TEST: CPT

## 2024-10-30 PROCEDURE — 83735 ASSAY OF MAGNESIUM: CPT

## 2024-10-30 PROCEDURE — 82330 ASSAY OF CALCIUM: CPT

## 2024-10-30 NOTE — PROGRESS NOTES
Tolerated procedure without incident: No adverse reactions noted: Verified follow up appt with patient ( 12/11/24 ): AVS offered and declined

## 2024-10-31 DIAGNOSIS — D86.89 HEPATIC GRANULOMA ASSOCIATED WITH SARCOIDOSIS: ICD-10-CM

## 2024-11-01 LAB — ACE SERPL-CCNC: 128 U/L (ref 14–82)

## 2024-11-01 RX ORDER — URSODIOL 300 MG/1
900 CAPSULE ORAL DAILY
Qty: 270 CAPSULE | Refills: 0 | Status: SHIPPED | OUTPATIENT
Start: 2024-11-01 | End: 2025-11-01

## 2024-11-02 DIAGNOSIS — D50.9 IRON DEFICIENCY ANEMIA, UNSPECIFIED IRON DEFICIENCY ANEMIA TYPE: ICD-10-CM

## 2024-11-05 RX ORDER — FERROUS SULFATE 324(65)MG
324 TABLET, DELAYED RELEASE (ENTERIC COATED) ORAL
Qty: 30 TABLET | Refills: 0 | Status: SHIPPED | OUTPATIENT
Start: 2024-11-05

## 2024-11-06 ENCOUNTER — TELEPHONE (OUTPATIENT)
Dept: NEPHROLOGY | Facility: CLINIC | Age: 59
End: 2024-11-06

## 2024-11-06 NOTE — TELEPHONE ENCOUNTER
Called patient and spoke with Mateo  reminding to please complete labwork/ blood and urine tests  prior to 11/11 appointment with Dr. Reyes. Mateo stated that patient done blood test and they will do urine test prior appt.

## 2024-11-07 DIAGNOSIS — C18.7 MALIGNANT NEOPLASM OF SIGMOID COLON (HCC): ICD-10-CM

## 2024-11-07 DIAGNOSIS — N13.5 URETERAL STRICTURE: ICD-10-CM

## 2024-11-07 DIAGNOSIS — Z51.5 PALLIATIVE CARE PATIENT: ICD-10-CM

## 2024-11-07 DIAGNOSIS — G89.3 CANCER RELATED PAIN: ICD-10-CM

## 2024-11-07 RX ORDER — OXYBUTYNIN CHLORIDE 5 MG/1
5 TABLET ORAL EVERY 6 HOURS PRN
Qty: 90 TABLET | Refills: 3 | Status: SHIPPED | OUTPATIENT
Start: 2024-11-07 | End: 2024-11-15

## 2024-11-07 RX ORDER — OXYCODONE HYDROCHLORIDE 5 MG/1
5 TABLET ORAL EVERY 4 HOURS PRN
Qty: 60 TABLET | Refills: 0 | Status: SHIPPED | OUTPATIENT
Start: 2024-11-07 | End: 2024-11-16

## 2024-11-07 NOTE — TELEPHONE ENCOUNTER
Raza Santos,    Can we contact the patient for a follow-up appointment.   I last saw her in September and our appointment from 10/30/2024 was cancelled. Could we check in with her for a follow-up in the coming weeks.   I provided her a refill for 60 tablets to bridge her to our next appointment.  Thank you.  Claudio

## 2024-11-07 NOTE — TELEPHONE ENCOUNTER
PDMP reviewed.  Recent records reviewed regarding patient's planned hospitalizations/procedures from 9/23/2024 and 10/18/2024.    The patient's appointment with me was cancelled on 10/30/2024.    Will reach out to my office to contact patient for a follow-up appointment.    Will provide a supply of OxyIR 5mg #60 tabs to provide a bridging script.

## 2024-11-07 NOTE — TELEPHONE ENCOUNTER
Refill must be reviewed and completed by the office or provider. The refill is unable to be approved or denied by the medication management team.      Patient Id Prescription # Filled Written Drug Label Qty Days Strength MME** Prescriber Pharmacy Payment REFILL #/Auth State Detail   1 [id number] 10/18/2024 10/18/2024 oxyCODONE HCL (Tablet) 5.0 5 5 MG 7.50 CASE LANDA [facility] [place] 0 / 0 [place]    1 [id number] 09/19/2024 09/19/2024 oxyCODONE HCL (Tablet) 120.0 20 5 MG 45.0 MANISHA VAUGHN [facility] [place] / 0 [place]    1 [id number] 08/22/2024 08/22/2024 oxyCODONE HCL (Tablet) 120.0 20 5 MG 45.0 [facility] [place] / 0 [place]    1 [id number] 07/25/2024 07/25/2024 oxyCODONE HCL (Tablet) 120.0 20 5 MG 45.0 [facility] [place] 0 / 0 [place]

## 2024-11-08 ENCOUNTER — APPOINTMENT (OUTPATIENT)
Dept: LAB | Facility: AMBULARY SURGERY CENTER | Age: 59
End: 2024-11-08
Payer: COMMERCIAL

## 2024-11-08 DIAGNOSIS — N18.31 STAGE 3A CHRONIC KIDNEY DISEASE (HCC): ICD-10-CM

## 2024-11-08 LAB
CREAT UR-MCNC: 109.2 MG/DL
MICROALBUMIN UR-MCNC: 1014.1 MG/L
MICROALBUMIN/CREAT 24H UR: 929 MG/G CREATININE (ref 0–30)

## 2024-11-08 PROCEDURE — 82043 UR ALBUMIN QUANTITATIVE: CPT

## 2024-11-08 PROCEDURE — 82570 ASSAY OF URINE CREATININE: CPT

## 2024-11-11 ENCOUNTER — OFFICE VISIT (OUTPATIENT)
Dept: NEPHROLOGY | Facility: CLINIC | Age: 59
End: 2024-11-11
Payer: COMMERCIAL

## 2024-11-11 VITALS
WEIGHT: 117 LBS | HEART RATE: 84 BPM | HEIGHT: 62 IN | OXYGEN SATURATION: 99 % | BODY MASS INDEX: 21.53 KG/M2 | SYSTOLIC BLOOD PRESSURE: 110 MMHG | DIASTOLIC BLOOD PRESSURE: 62 MMHG

## 2024-11-11 DIAGNOSIS — E83.52 HYPERCALCEMIA: Primary | ICD-10-CM

## 2024-11-11 DIAGNOSIS — N18.30 BENIGN HYPERTENSION WITH CHRONIC KIDNEY DISEASE, STAGE III (HCC): ICD-10-CM

## 2024-11-11 DIAGNOSIS — D86.9 SARCOIDOSIS: ICD-10-CM

## 2024-11-11 DIAGNOSIS — N20.0 NEPHROLITHIASIS: ICD-10-CM

## 2024-11-11 DIAGNOSIS — R80.1 PERSISTENT PROTEINURIA: ICD-10-CM

## 2024-11-11 DIAGNOSIS — N18.31 CHRONIC KIDNEY DISEASE-MINERAL BONE DISORDER (CKD-MBD) WITH STAGE 3A CHRONIC KIDNEY DISEASE (HCC): ICD-10-CM

## 2024-11-11 DIAGNOSIS — N18.31 CKD STAGE G3A/A3, GFR 45-59 AND ALBUMIN CREATININE RATIO >300 MG/G (HCC): ICD-10-CM

## 2024-11-11 DIAGNOSIS — E83.9 CHRONIC KIDNEY DISEASE-MINERAL BONE DISORDER (CKD-MBD) WITH STAGE 3A CHRONIC KIDNEY DISEASE (HCC): ICD-10-CM

## 2024-11-11 DIAGNOSIS — E87.6 HYPOKALEMIA: ICD-10-CM

## 2024-11-11 DIAGNOSIS — E27.9 ADRENAL NODULE (HCC): ICD-10-CM

## 2024-11-11 DIAGNOSIS — C20 RECTAL CANCER (HCC): ICD-10-CM

## 2024-11-11 DIAGNOSIS — D50.8 IRON DEFICIENCY ANEMIA SECONDARY TO INADEQUATE DIETARY IRON INTAKE: ICD-10-CM

## 2024-11-11 DIAGNOSIS — E83.42 HYPOMAGNESEMIA: ICD-10-CM

## 2024-11-11 DIAGNOSIS — I12.9 BENIGN HYPERTENSION WITH CHRONIC KIDNEY DISEASE, STAGE III (HCC): ICD-10-CM

## 2024-11-11 DIAGNOSIS — M89.9 CHRONIC KIDNEY DISEASE-MINERAL BONE DISORDER (CKD-MBD) WITH STAGE 3A CHRONIC KIDNEY DISEASE (HCC): ICD-10-CM

## 2024-11-11 PROCEDURE — 99214 OFFICE O/P EST MOD 30 MIN: CPT | Performed by: STUDENT IN AN ORGANIZED HEALTH CARE EDUCATION/TRAINING PROGRAM

## 2024-11-11 RX ORDER — LISINOPRIL 5 MG/1
5 TABLET ORAL DAILY
Qty: 90 TABLET | Refills: 1 | Status: SHIPPED | OUTPATIENT
Start: 2024-11-11

## 2024-11-11 NOTE — ASSESSMENT & PLAN NOTE
Calcium 10.6 mg/dL  Likely secondary to sarcoidosis, off prednisone  Referred to pulmonology/rheumatology  Patient was better controlled when she was taking prednisone but prednisone was discontinued in preparation to ileostomy reversal  Orders:    Vitamin D 1,25 Dihydroxy; Future    C-reactive protein; Future    Calcium, ionized; Future    Basic metabolic panel; Future

## 2024-11-11 NOTE — PATIENT INSTRUCTIONS
Thank you for coming to your visit today. As we discussed you kidney function is stable at your baseline, your creatinine is 1.08mg/dL. Your calcium is slightly high  Your electrolytes are normal and you have mor eprotiens in the urine. . Please follow the recommendations below       Recommend low sodium (salt) food    Avoid nonsteroidal anti-inflammatory drugs such as Naprosyn, ibuprofen, Aleve, Advil, Celebrex, Meloxicam (Mobic) etc.  You can use Tylenol as needed if you do not have any liver condition to be concerned about    Try to avoid medications such as pantoprazole or  Protonix/Nexium or Esomeprazole)/Prilosec or omeprazole/Prevacid or lansoprazole/AcipHex or Rabeprazole.  If you are able to, use Pepcid as this is safer for your kidneys.    Try to exercise at least 30 minutes 3 days a week to begin with with an ultimate goal of 5 days a week for at least 30 minutes    Stop Amlodipine and start Lisinopril 5mg daily     Next Visit in 3-4  months with results   If you need to see us earlier we can change the appointment for you        Joselyn Reyes [place]onde, MD  Nephrology Attending

## 2024-11-11 NOTE — PROGRESS NOTES
Ambulatory Visit  Name: Danielle Haque      : 1965      MRN: [id number]  Encounter Provider: Joselyn Reyes Bahamonde, MD  Encounter Date: 2024   Encounter department: [facility] [place]    Assessment & Plan  Benign hypertension with chronic kidney disease, stage III ([facility])  Lab Results   Component Value Date    EGFR 57 10/30/2024    EGFR 87 2024    EGFR 73 2024    CREATININE 1.08 10/30/2024    CREATININE 0.76 2024    CREATININE 0.88 2024   Volume-: Euvolemic   Blood pressure: Normotensive, /62  Recommend low-sodium diet  Discontinue Lidopin as below  BP well-controlled, no evidence of hyperaldosteronism at this time   Advised to maintain a good BP control to prevent progression of CKD          CKD stage G3a/A3, GFR 45-59 and albumin creatinine ratio >300 mg/g ([facility])  Lab Results   Component Value Date    EGFR 57 10/30/2024    EGFR 87 2024    EGFR 73 2024    CREATININE 1.08 10/30/2024    CREATININE 0.76 2024    CREATININE 0.88 2024     Baseline creatinine 1.08 mg/dL, fluctuates  UA proteinuria, no hematuria  Etiology: Secondary to obstructive uropathy with right hydronephrosis in the settings of nephrolithiasis  Current creatinine: 1.08mg/dL at baseline   Renal imaging : Right-sided hydronephrosis, resolved  Treatment:   No urgent indication of dialysis at this time   Will continue to monitor kidney function closely with BMP, will repeat electrolytes      Orders:    Basic metabolic panel; Future    Albumin / creatinine urine ratio; Future    Urinalysis with microscopic; Future    Sarcoidosis  Lung and hepatic involvement, elevated ACE level, NC granulomas on pleural biopsy and liver biopsy   patient was having prednisone and was discontinue in preparation for surgery however since then she has been in the hospital 1 time for hypercalcemia and now calcium seems to be trending up again  ACE limited to 124  Referred to  rheumatology, pulmonology       Hypercalcemia  Calcium 10.6 mg/dL  Likely secondary to sarcoidosis, off prednisone  Referred to pulmonology/rheumatology  Patient was better controlled when she was taking prednisone but prednisone was discontinued in preparation to ileostomy reversal  Orders:    Vitamin D 1,25 Dihydroxy; Future    C-reactive protein; Future    Calcium, ionized; Future    Basic metabolic panel; Future    Persistent proteinuria  UACR 929 mg/dL, trending up  Will repeat UPCR  Concern of renal involvement of sarcoidosis  Start lisinopril 5 mg, discontinue amlodipine  Orders:    lisinopril (ZESTRIL) 5 mg tablet; Take 1 tablet (5 mg total) by mouth daily    Hypokalemia  Potassium 4.2 mg/L  Potassium is stable continue with 40 mg twice daily for now   Will monitor BMP    has an adrenal nodule with a RR of 20, normotensive, unlikely to be the reason of hypokalemia          Hypomagnesemia  Serum magnesium 1.7 mg/dL  Continue supplements       Rectal cancer (HCC)  Status post diverting ileostomy  5-FU with RT  Follow-up with oncology  Was scheduled for ileostomy reversal but was canceled due to hypercalcemia   Patient is to be off prednisone for at least 6 weeks?           Adrenal nodule (HCC)  8 mm right adrenal nodule  ARR 22  Normotensive, no evidence of hyper Blair  Need to repeat ARR       Iron deficiency anemia secondary to inadequate dietary iron intake  Current hemoglobin:9.2mg/dL  Iron deficiency anemia  Continue iron supplements       Nephrolithiasis  Kidney stone has right-sided obstruction  Current nephrolithiasis possible sarcoidosis with hypercalcemia   Follow-up with urology   Chronic kidney disease-mineral bone disorder (CKD-MBD) with stage 3a chronic kidney disease (HCC)  Lab Results   Component Value Date    EGFR 57 10/30/2024    EGFR 87 09/27/2024    EGFR 73 09/26/2024    CREATININE 1.08 10/30/2024    CREATININE 0.76 09/27/2024    CREATININE 0.88 09/26/2024     Serum calcium 10.6, above goal  possible secondary to sarcoidosis  PTH appropriately suppressed suppressed   No monoclonal gammopathy           History of Present Illness     Danielle Haque is a 58 y.o. female  PMH cT3 cN0 cM0 rectal adenocarcinoma in the [facility] s/p diverting ileostomy, started on neoadjuvant CapeOx, complicated with reaction on the second cycle, treated with Xeloda,5-FU with RT , renal stricture with bilateral stent , sarcoidosis, on steroids.  Patient was admitted in August 2023 with SBO complicated with hypokalemia.  Patient has a follow-up electrolyte disturbance and CKD     History obtained from : patient  Review of Systems   Constitutional:  Negative for activity change and appetite change.   HENT:  Negative for congestion and dental problem.    Eyes:  Negative for discharge.   Respiratory:  Negative for cough and choking.    Cardiovascular:  Negative for chest pain.   Gastrointestinal:  Negative for abdominal pain.   Endocrine: Negative for cold intolerance.   Genitourinary:  Negative for dysuria.   Musculoskeletal:  Negative for arthralgias and back pain.   Skin:  Negative for color change and pallor.   Neurological:  Negative for dizziness.   Psychiatric/Behavioral:  Negative for agitation.      Pertinent Medical History        # Hypokalemia/ypomagnesemia  Potassium 4.2 mg/L  Potassium is stable continue with 40 mg twice daily for now    has an adrenal nodule with a RR of 20, normotensive, unlikely to be the reason of hypokalemia        #Volume status/hypertension:  Volume-: Euvolemic   Blood pressure: Normotensive, BP on the low 100s       # Rectal adenocarcinoma  Status post diverting ileostomy  5-FU with RT        # Adrenal nodule  8 mm right adrenal nodule  ARR 22  Normotensive, no evidence of hyper Blair  Need to repeat ARR        # CKD G3a  Etiology: Secondary to obstructive uropathy with right hydronephrosis in the settings of nephrolithiasis  Current creatinine: 1.08mg/dL at baseline   UA:  Leukocyturia, hematuria  Renal imaging : Right-sided hydronephrosis, resolved        # CKD MBD/hypercalcemia   Serum calcium 10.6  PTH suppressed   No monoclonal gammopathy     #Anemia:  Current hemoglobin:9.2mg/dL  Iron deficiency anemia     # nephrolithiasis  Kidney stone has right-sided obstruction  Follow-up with urology  Enforce fluid intake  Status post stent   Medical History Reviewed by provider this encounter:       Current Outpatient Medications on File Prior to Visit   Medication Sig Dispense Refill    acetaminophen (TYLENOL) 325 mg tablet Take 2 tablets (650 mg total) by mouth every 4 (four) hours as needed for mild pain      amLODIPine (NORVASC) 5 mg tablet Take 1 tablet (5 mg total) by mouth daily 30 tablet 0    cyanocobalamin (VITAMIN B-12) 1000 MCG tablet Take 1,000 mcg by mouth daily      docusate sodium (COLACE) 100 mg capsule Take 1 capsule (100 mg total) by mouth 2 (two) times a day for 15 days 30 capsule 0    estrogens, conjugated (Premarin) vaginal cream Insert 0.3 g into the vagina 3 (three) times a week 30 g 6    fenofibrate (TRICOR) 145 mg tablet Take 1 tablet (145 mg total) by mouth daily 90 tablet 3    ferrous sulfate 324 (65 Fe) mg TAKE 1 TABLET (324 MG TOTAL) BY MOUTH DAILY BEFORE BREAKFAST 30 tablet 0    naloxone (NARCAN) 4 mg/0.1 mL nasal spray Administer 1 spray into a nostril. If no response after 2-3 minutes, give another dose in the other nostril using a new spray. For use in emergencies for opioid / pain medication reversal for accidental ingestion, respiratory depression, sedation or concerns for overdose. 1 each 1    naproxen (NAPROSYN) 500 mg tablet Take 1 tablet (500 mg total) by mouth 2 (two) times a day with meals for 5 days For pain 10 tablet 0    ondansetron (ZOFRAN) 4 mg tablet Take 1 tablet (4 mg total) by mouth every 8 (eight) hours as needed for nausea or vomiting 20 tablet 2    oxybutynin (DITROPAN) 5 mg tablet Take 1 tablet (5 mg total) by mouth every 6 (six) hours  as needed (bladder spasms) 30 tablet 0    oxybutynin (DITROPAN) 5 mg tablet TAKE 1 TABLET (5 MG TOTAL) BY MOUTH EVERY 6 (SIX) HOURS AS NEEDED (BLADDER SPASMS) 90 tablet 3    oxyCODONE (Roxicodone) 5 immediate release tablet Take 1 tablet (5 mg total) by mouth every 4 (four) hours as needed for moderate pain Max Daily Amount: 30 mg 60 tablet 0    potassium chloride (MICRO-K) 10 MEQ CR capsule TAKE 4 CAPSULES (40 MEQ TOTAL) BY MOUTH 2 (TWO) TIMES A  capsule 5    tamsulosin (FLOMAX) 0.4 mg Take 1 capsule (0.4 mg total) by mouth daily with dinner 90 capsule 3    tamsulosin (FLOMAX) 0.4 mg Take 1 capsule (0.4 mg total) by mouth daily with dinner 15 capsule 0    ursodiol (ACTIGALL) 300 mg capsule Take 3 capsules (900 mg total) by mouth in the morning 270 capsule 0     No current facility-administered medications on file prior to visit.      Social History     Tobacco Use    Smoking status: Never     Passive exposure: Past    Smokeless tobacco: Never   Vaping Use    Vaping status: Never Used   Substance and Sexual Activity    Alcohol use: Never    Drug use: Never    Sexual activity: Not Currently         Objective     There were no vitals taken for this visit.    Physical Exam  General:  no acute distress at this time  Skin:  No acute rash  Eyes:  No scleral icterus and noninjected  ENT:  mucous membranes moist  Neck:  no carotid bruits  Chest:  Clear to auscultation percussion, good respiratory effort, no use of accessory respiratory muscles  CVS:  Regular rate and rhythm without rub   Abdomen:  soft and nontender   Extremities: no significant lower extremity edema  Neuro:  No gross focality  Psych:  Alert , cooperative

## 2024-11-12 PROBLEM — N18.30 BENIGN HYPERTENSION WITH CHRONIC KIDNEY DISEASE, STAGE III (HCC): Status: ACTIVE | Noted: 2024-11-12

## 2024-11-12 PROBLEM — R80.1 PERSISTENT PROTEINURIA: Status: ACTIVE | Noted: 2024-11-12

## 2024-11-12 PROBLEM — I12.9 BENIGN HYPERTENSION WITH CHRONIC KIDNEY DISEASE, STAGE III (HCC): Status: ACTIVE | Noted: 2024-11-12

## 2024-11-12 PROBLEM — N18.31 CKD STAGE G3A/A3, GFR 45-59 AND ALBUMIN CREATININE RATIO >300 MG/G (HCC): Status: ACTIVE | Noted: 2024-11-12

## 2024-11-12 NOTE — ASSESSMENT & PLAN NOTE
Lab Results   Component Value Date    EGFR 57 10/30/2024    EGFR 87 09/27/2024    EGFR 73 09/26/2024    CREATININE 1.08 10/30/2024    CREATININE 0.76 09/27/2024    CREATININE 0.88 09/26/2024     Baseline creatinine 1.08 mg/dL, fluctuates  UA proteinuria, no hematuria  Etiology: Secondary to obstructive uropathy with right hydronephrosis in the settings of nephrolithiasis  Current creatinine: 1.08mg/dL at baseline   Renal imaging : Right-sided hydronephrosis, resolved  Treatment:   No urgent indication of dialysis at this time   Will continue to monitor kidney function closely with BMP, will repeat electrolytes      Orders:    Basic metabolic panel; Future    Albumin / creatinine urine ratio; Future    Urinalysis with microscopic; Future

## 2024-11-12 NOTE — ASSESSMENT & PLAN NOTE
UACR 929 mg/dL, trending up  Will repeat UPCR  Concern of renal involvement of sarcoidosis  Start lisinopril 5 mg, discontinue amlodipine  Orders:    lisinopril (ZESTRIL) 5 mg tablet; Take 1 tablet (5 mg total) by mouth daily

## 2024-11-12 NOTE — ASSESSMENT & PLAN NOTE
Kidney stone has right-sided obstruction  Current nephrolithiasis possible sarcoidosis with hypercalcemia   Follow-up with urology

## 2024-11-12 NOTE — ASSESSMENT & PLAN NOTE
8 mm right adrenal nodule  ARR 22  Normotensive, no evidence of hyper Blair  Need to repeat ARR

## 2024-11-12 NOTE — ASSESSMENT & PLAN NOTE
Status post diverting ileostomy  5-FU with RT  Follow-up with oncology  Was scheduled for ileostomy reversal but was canceled due to hypercalcemia   Patient is to be off prednisone for at least 6 weeks?

## 2024-11-12 NOTE — ASSESSMENT & PLAN NOTE
Potassium 4.2 mg/L  Potassium is stable continue with 40 mg twice daily for now   Will monitor BMP    has an adrenal nodule with a RR of 20, normotensive, unlikely to be the reason of hypokalemia

## 2024-11-12 NOTE — ASSESSMENT & PLAN NOTE
Lab Results   Component Value Date    EGFR 57 10/30/2024    EGFR 87 09/27/2024    EGFR 73 09/26/2024    CREATININE 1.08 10/30/2024    CREATININE 0.76 09/27/2024    CREATININE 0.88 09/26/2024     Serum calcium 10.6, above goal possible secondary to sarcoidosis  PTH appropriately suppressed suppressed   No monoclonal gammopathy

## 2024-11-12 NOTE — ASSESSMENT & PLAN NOTE
Lab Results   Component Value Date    EGFR 57 10/30/2024    EGFR 87 09/27/2024    EGFR 73 09/26/2024    CREATININE 1.08 10/30/2024    CREATININE 0.76 09/27/2024    CREATININE 0.88 09/26/2024   Volume-: Euvolemic   Blood pressure: Normotensive, /62  Recommend low-sodium diet  Discontinue Lidopin as below  BP well-controlled, no evidence of hyperaldosteronism at this time   Advised to maintain a good BP control to prevent progression of CKD

## 2024-11-12 NOTE — ASSESSMENT & PLAN NOTE
Lung and hepatic involvement, elevated ACE level, NC granulomas on pleural biopsy and liver biopsy   patient was having prednisone and was discontinue in preparation for surgery however since then she has been in the hospital 1 time for hypercalcemia and now calcium seems to be trending up again  ACE limited to 124  Referred to rheumatology, pulmonology

## 2024-11-13 ENCOUNTER — TELEPHONE (OUTPATIENT)
Dept: RHEUMATOLOGY | Facility: CLINIC | Age: 59
End: 2024-11-13

## 2024-11-13 NOTE — TELEPHONE ENCOUNTER
Please reschedule patient to see me sooner in November as a follow-up (has an appointment in December right now). She needs to be seen sooner to make new medication changes.

## 2024-11-14 ENCOUNTER — HOSPITAL ENCOUNTER (INPATIENT)
Facility: HOSPITAL | Age: 59
LOS: 1 days | Discharge: HOME | DRG: 463 | End: 2024-11-16
Attending: EMERGENCY MEDICINE | Admitting: INTERNAL MEDICINE
Payer: COMMERCIAL

## 2024-11-14 ENCOUNTER — PATIENT MESSAGE (OUTPATIENT)
Dept: GASTROENTEROLOGY | Facility: AMBULARY SURGERY CENTER | Age: 59
End: 2024-11-14

## 2024-11-14 DIAGNOSIS — N13.30 HYDRONEPHROSIS, RIGHT: ICD-10-CM

## 2024-11-14 DIAGNOSIS — R11.0 NAUSEA: ICD-10-CM

## 2024-11-14 DIAGNOSIS — N12 PYELONEPHRITIS OF RIGHT KIDNEY: Primary | ICD-10-CM

## 2024-11-14 DIAGNOSIS — N13.1 HYDRONEPHROSIS DUE TO URETERAL STRICTURE: ICD-10-CM

## 2024-11-14 LAB
ALBUMIN SERPL BCG-MCNC: 4.2 G/DL (ref 3.5–5)
ALP SERPL-CCNC: 116 U/L (ref 34–104)
ALT SERPL W P-5'-P-CCNC: 19 U/L (ref 7–52)
ANION GAP SERPL CALCULATED.3IONS-SCNC: 8 MMOL/L (ref 4–13)
AST SERPL W P-5'-P-CCNC: 27 U/L (ref 13–39)
BASOPHILS # BLD AUTO: 0.02 THOUSANDS/ÂΜL (ref 0–0.1)
BASOPHILS NFR BLD AUTO: 0 % (ref 0–1)
BILIRUB SERPL-MCNC: 0.62 MG/DL (ref 0.2–1)
BUN SERPL-MCNC: 22 MG/DL (ref 5–25)
CALCIUM SERPL-MCNC: 12 MG/DL (ref 8.4–10.2)
CHLORIDE SERPL-SCNC: 100 MMOL/L (ref 96–108)
CO2 SERPL-SCNC: 22 MMOL/L (ref 21–32)
CREAT SERPL-MCNC: 1.11 MG/DL (ref 0.6–1.3)
EOSINOPHIL # BLD AUTO: 0.45 THOUSAND/ÂΜL (ref 0–0.61)
EOSINOPHIL NFR BLD AUTO: 7 % (ref 0–6)
ERYTHROCYTE [DISTWIDTH] IN BLOOD BY AUTOMATED COUNT: 14.2 % (ref 11.6–15.1)
GFR SERPL CREATININE-BSD FRML MDRD: 55 ML/MIN/1.73SQ M
GLUCOSE SERPL-MCNC: 121 MG/DL (ref 65–140)
HCT VFR BLD AUTO: 33.4 % (ref 34.8–46.1)
HGB BLD-MCNC: 11.1 G/DL (ref 11.5–15.4)
IMM GRANULOCYTES # BLD AUTO: 0.02 THOUSAND/UL (ref 0–0.2)
IMM GRANULOCYTES NFR BLD AUTO: 0 % (ref 0–2)
LIPASE SERPL-CCNC: 80 U/L (ref 11–82)
LYMPHOCYTES # BLD AUTO: 0.35 THOUSANDS/ÂΜL (ref 0.6–4.47)
LYMPHOCYTES NFR BLD AUTO: 6 % (ref 14–44)
MCH RBC QN AUTO: 29.8 PG (ref 26.8–34.3)
MCHC RBC AUTO-ENTMCNC: 33.2 G/DL (ref 31.4–37.4)
MCV RBC AUTO: 90 FL (ref 82–98)
MONOCYTES # BLD AUTO: 0.43 THOUSAND/ÂΜL (ref 0.17–1.22)
MONOCYTES NFR BLD AUTO: 7 % (ref 4–12)
NEUTROPHILS # BLD AUTO: 5.14 THOUSANDS/ÂΜL (ref 1.85–7.62)
NEUTS SEG NFR BLD AUTO: 80 % (ref 43–75)
NRBC BLD AUTO-RTO: 0 /100 WBCS
PLATELET # BLD AUTO: 259 THOUSANDS/UL (ref 149–390)
PMV BLD AUTO: 9.2 FL (ref 8.9–12.7)
POTASSIUM SERPL-SCNC: 4.2 MMOL/L (ref 3.5–5.3)
PROT SERPL-MCNC: 7.7 G/DL (ref 6.4–8.4)
RBC # BLD AUTO: 3.73 MILLION/UL (ref 3.81–5.12)
SODIUM SERPL-SCNC: 130 MMOL/L (ref 135–147)
WBC # BLD AUTO: 6.41 THOUSAND/UL (ref 4.31–10.16)

## 2024-11-14 PROCEDURE — 99285 EMERGENCY DEPT VISIT HI MDM: CPT

## 2024-11-14 PROCEDURE — 36415 COLL VENOUS BLD VENIPUNCTURE: CPT

## 2024-11-14 PROCEDURE — 83735 ASSAY OF MAGNESIUM: CPT

## 2024-11-14 PROCEDURE — 85025 COMPLETE CBC W/AUTO DIFF WBC: CPT

## 2024-11-14 PROCEDURE — 80053 COMPREHEN METABOLIC PANEL: CPT

## 2024-11-14 PROCEDURE — 96375 TX/PRO/DX INJ NEW DRUG ADDON: CPT

## 2024-11-14 PROCEDURE — 83690 ASSAY OF LIPASE: CPT

## 2024-11-14 RX ORDER — KETOROLAC TROMETHAMINE 30 MG/ML
15 INJECTION, SOLUTION INTRAMUSCULAR; INTRAVENOUS ONCE
Status: COMPLETED | OUTPATIENT
Start: 2024-11-14 | End: 2024-11-14

## 2024-11-14 RX ORDER — ONDANSETRON 2 MG/ML
4 INJECTION INTRAMUSCULAR; INTRAVENOUS ONCE
Status: COMPLETED | OUTPATIENT
Start: 2024-11-14 | End: 2024-11-14

## 2024-11-14 RX ADMIN — KETOROLAC TROMETHAMINE 15 MG: 30 INJECTION, SOLUTION INTRAMUSCULAR; INTRAVENOUS at 22:52

## 2024-11-14 RX ADMIN — IOHEXOL 50 ML: 240 INJECTION, SOLUTION INTRATHECAL; INTRAVASCULAR; INTRAVENOUS; ORAL at 23:10

## 2024-11-14 RX ADMIN — ONDANSETRON 4 MG: 2 INJECTION INTRAMUSCULAR; INTRAVENOUS at 22:38

## 2024-11-15 ENCOUNTER — TELEPHONE (OUTPATIENT)
Age: 59
End: 2024-11-15

## 2024-11-15 ENCOUNTER — APPOINTMENT (EMERGENCY)
Dept: CT IMAGING | Facility: HOSPITAL | Age: 59
DRG: 463 | End: 2024-11-15
Payer: COMMERCIAL

## 2024-11-15 PROBLEM — N12 PYELONEPHRITIS: Status: ACTIVE | Noted: 2024-11-15

## 2024-11-15 PROBLEM — R93.89 ABNORMAL CT SCAN: Status: ACTIVE | Noted: 2024-09-24

## 2024-11-15 LAB
ANION GAP SERPL CALCULATED.3IONS-SCNC: 7 MMOL/L (ref 4–13)
BACTERIA UR QL AUTO: ABNORMAL /HPF
BASOPHILS # BLD AUTO: 0.02 THOUSANDS/ÂΜL (ref 0–0.1)
BASOPHILS NFR BLD AUTO: 0 % (ref 0–1)
BILIRUB UR QL STRIP: NEGATIVE
BUDDING YEAST: PRESENT
BUN SERPL-MCNC: 20 MG/DL (ref 5–25)
CALCIUM SERPL-MCNC: 10.7 MG/DL (ref 8.4–10.2)
CAOX CRY URNS QL MICRO: ABNORMAL /HPF
CHLORIDE SERPL-SCNC: 104 MMOL/L (ref 96–108)
CLARITY UR: ABNORMAL
CO2 SERPL-SCNC: 21 MMOL/L (ref 21–32)
COLOR UR: ABNORMAL
CREAT SERPL-MCNC: 1.36 MG/DL (ref 0.6–1.3)
EOSINOPHIL # BLD AUTO: 0.36 THOUSAND/ÂΜL (ref 0–0.61)
EOSINOPHIL NFR BLD AUTO: 6 % (ref 0–6)
ERYTHROCYTE [DISTWIDTH] IN BLOOD BY AUTOMATED COUNT: 14.5 % (ref 11.6–15.1)
GFR SERPL CREATININE-BSD FRML MDRD: 43 ML/MIN/1.73SQ M
GLUCOSE SERPL-MCNC: 57 MG/DL (ref 65–140)
GLUCOSE UR STRIP-MCNC: NEGATIVE MG/DL
HCT VFR BLD AUTO: 32 % (ref 34.8–46.1)
HGB BLD-MCNC: 10.3 G/DL (ref 11.5–15.4)
HGB UR QL STRIP.AUTO: ABNORMAL
IMM GRANULOCYTES # BLD AUTO: 0.01 THOUSAND/UL (ref 0–0.2)
IMM GRANULOCYTES NFR BLD AUTO: 0 % (ref 0–2)
KETONES UR STRIP-MCNC: NEGATIVE MG/DL
LEUKOCYTE ESTERASE UR QL STRIP: ABNORMAL
LYMPHOCYTES # BLD AUTO: 0.38 THOUSANDS/ÂΜL (ref 0.6–4.47)
LYMPHOCYTES NFR BLD AUTO: 7 % (ref 14–44)
MAGNESIUM SERPL-MCNC: 1.7 MG/DL (ref 1.9–2.7)
MCH RBC QN AUTO: 29.3 PG (ref 26.8–34.3)
MCHC RBC AUTO-ENTMCNC: 32.2 G/DL (ref 31.4–37.4)
MCV RBC AUTO: 91 FL (ref 82–98)
MONOCYTES # BLD AUTO: 0.44 THOUSAND/ÂΜL (ref 0.17–1.22)
MONOCYTES NFR BLD AUTO: 8 % (ref 4–12)
MUCOUS THREADS UR QL AUTO: ABNORMAL
NEUTROPHILS # BLD AUTO: 4.45 THOUSANDS/ÂΜL (ref 1.85–7.62)
NEUTS SEG NFR BLD AUTO: 79 % (ref 43–75)
NITRITE UR QL STRIP: NEGATIVE
NON-SQ EPI CELLS URNS QL MICRO: ABNORMAL /HPF
NRBC BLD AUTO-RTO: 0 /100 WBCS
PH UR STRIP.AUTO: 5.5 [PH]
PLATELET # BLD AUTO: 215 THOUSANDS/UL (ref 149–390)
PMV BLD AUTO: 9.6 FL (ref 8.9–12.7)
POTASSIUM SERPL-SCNC: 4.6 MMOL/L (ref 3.5–5.3)
PROT UR STRIP-MCNC: ABNORMAL MG/DL
RBC # BLD AUTO: 3.52 MILLION/UL (ref 3.81–5.12)
RBC #/AREA URNS AUTO: ABNORMAL /HPF
SODIUM SERPL-SCNC: 132 MMOL/L (ref 135–147)
SP GR UR STRIP.AUTO: 1.01 (ref 1–1.03)
UROBILINOGEN UR STRIP-ACNC: <2 MG/DL
WBC # BLD AUTO: 5.66 THOUSAND/UL (ref 4.31–10.16)
WBC #/AREA URNS AUTO: ABNORMAL /HPF
WBC CLUMPS # UR AUTO: PRESENT /UL

## 2024-11-15 PROCEDURE — 87086 URINE CULTURE/COLONY COUNT: CPT

## 2024-11-15 PROCEDURE — 74177 CT ABD & PELVIS W/CONTRAST: CPT

## 2024-11-15 PROCEDURE — 96365 THER/PROPH/DIAG IV INF INIT: CPT

## 2024-11-15 PROCEDURE — 81001 URINALYSIS AUTO W/SCOPE: CPT

## 2024-11-15 PROCEDURE — 36415 COLL VENOUS BLD VENIPUNCTURE: CPT

## 2024-11-15 PROCEDURE — 99254 IP/OBS CNSLTJ NEW/EST MOD 60: CPT | Performed by: UROLOGY

## 2024-11-15 PROCEDURE — NC001 PR NO CHARGE: Performed by: EMERGENCY MEDICINE

## 2024-11-15 PROCEDURE — 96361 HYDRATE IV INFUSION ADD-ON: CPT

## 2024-11-15 PROCEDURE — 96375 TX/PRO/DX INJ NEW DRUG ADDON: CPT

## 2024-11-15 PROCEDURE — 99223 1ST HOSP IP/OBS HIGH 75: CPT | Performed by: INTERNAL MEDICINE

## 2024-11-15 PROCEDURE — 85025 COMPLETE CBC W/AUTO DIFF WBC: CPT

## 2024-11-15 PROCEDURE — 80048 BASIC METABOLIC PNL TOTAL CA: CPT

## 2024-11-15 PROCEDURE — 93005 ELECTROCARDIOGRAM TRACING: CPT

## 2024-11-15 RX ORDER — OXYBUTYNIN CHLORIDE 5 MG/1
5 TABLET ORAL EVERY 6 HOURS PRN
Status: DISCONTINUED | OUTPATIENT
Start: 2024-11-15 | End: 2024-11-16 | Stop reason: HOSPADM

## 2024-11-15 RX ORDER — POTASSIUM CHLORIDE 1500 MG/1
20 TABLET, EXTENDED RELEASE ORAL EVERY 6 HOURS
Status: DISCONTINUED | OUTPATIENT
Start: 2024-11-15 | End: 2024-11-15

## 2024-11-15 RX ORDER — ACETAMINOPHEN 325 MG/1
650 TABLET ORAL EVERY 4 HOURS PRN
Status: DISCONTINUED | OUTPATIENT
Start: 2024-11-15 | End: 2024-11-15

## 2024-11-15 RX ORDER — OXYCODONE HYDROCHLORIDE 10 MG/1
10 TABLET ORAL EVERY 4 HOURS PRN
Status: DISCONTINUED | OUTPATIENT
Start: 2024-11-15 | End: 2024-11-16 | Stop reason: HOSPADM

## 2024-11-15 RX ORDER — OXYCODONE HYDROCHLORIDE 5 MG/1
5 TABLET ORAL EVERY 4 HOURS PRN
Status: DISCONTINUED | OUTPATIENT
Start: 2024-11-15 | End: 2024-11-16 | Stop reason: HOSPADM

## 2024-11-15 RX ORDER — LISINOPRIL 10 MG/1
5 TABLET ORAL DAILY
Status: DISCONTINUED | OUTPATIENT
Start: 2024-11-15 | End: 2024-11-16 | Stop reason: HOSPADM

## 2024-11-15 RX ORDER — SODIUM CHLORIDE 9 MG/ML
125 INJECTION, SOLUTION INTRAVENOUS CONTINUOUS
Status: DISCONTINUED | OUTPATIENT
Start: 2024-11-15 | End: 2024-11-16 | Stop reason: HOSPADM

## 2024-11-15 RX ORDER — FENOFIBRATE 145 MG/1
145 TABLET, COATED ORAL DAILY
Status: DISCONTINUED | OUTPATIENT
Start: 2024-11-15 | End: 2024-11-16 | Stop reason: HOSPADM

## 2024-11-15 RX ORDER — ACETAMINOPHEN 325 MG/1
650 TABLET ORAL EVERY 4 HOURS PRN
Status: DISCONTINUED | OUTPATIENT
Start: 2024-11-15 | End: 2024-11-16 | Stop reason: HOSPADM

## 2024-11-15 RX ORDER — URSODIOL 300 MG/1
900 CAPSULE ORAL DAILY
Status: DISCONTINUED | OUTPATIENT
Start: 2024-11-15 | End: 2024-11-16 | Stop reason: HOSPADM

## 2024-11-15 RX ORDER — POTASSIUM CHLORIDE 1500 MG/1
20 TABLET, EXTENDED RELEASE ORAL DAILY
Status: DISCONTINUED | OUTPATIENT
Start: 2024-11-15 | End: 2024-11-16 | Stop reason: HOSPADM

## 2024-11-15 RX ORDER — ENOXAPARIN SODIUM 100 MG/ML
40 INJECTION SUBCUTANEOUS DAILY
Status: DISCONTINUED | OUTPATIENT
Start: 2024-11-15 | End: 2024-11-16 | Stop reason: HOSPADM

## 2024-11-15 RX ORDER — HYDROMORPHONE HCL/PF 1 MG/ML
0.5 SYRINGE (ML) INJECTION EVERY 2 HOUR PRN
Status: DISCONTINUED | OUTPATIENT
Start: 2024-11-15 | End: 2024-11-16 | Stop reason: HOSPADM

## 2024-11-15 RX ORDER — HYDROMORPHONE HCL/PF 1 MG/ML
0.5 SYRINGE (ML) INJECTION ONCE
Refills: 0 | Status: COMPLETED | OUTPATIENT
Start: 2024-11-15 | End: 2024-11-15

## 2024-11-15 RX ORDER — DOCUSATE SODIUM 100 MG/1
100 CAPSULE, LIQUID FILLED ORAL 2 TIMES DAILY
Status: DISCONTINUED | OUTPATIENT
Start: 2024-11-15 | End: 2024-11-16 | Stop reason: HOSPADM

## 2024-11-15 RX ORDER — ONDANSETRON 2 MG/ML
4 INJECTION INTRAMUSCULAR; INTRAVENOUS EVERY 6 HOURS PRN
Status: DISCONTINUED | OUTPATIENT
Start: 2024-11-15 | End: 2024-11-16 | Stop reason: HOSPADM

## 2024-11-15 RX ORDER — TAMSULOSIN HYDROCHLORIDE 0.4 MG/1
0.4 CAPSULE ORAL
Status: DISCONTINUED | OUTPATIENT
Start: 2024-11-15 | End: 2024-11-16 | Stop reason: HOSPADM

## 2024-11-15 RX ADMIN — IOHEXOL 85 ML: 350 INJECTION, SOLUTION INTRAVENOUS at 01:05

## 2024-11-15 RX ADMIN — TAMSULOSIN HYDROCHLORIDE 0.4 MG: 0.4 CAPSULE ORAL at 17:23

## 2024-11-15 RX ADMIN — URSODIOL 900 MG: 300 CAPSULE ORAL at 08:49

## 2024-11-15 RX ADMIN — CYANOCOBALAMIN TAB 500 MCG 1000 MCG: 500 TAB at 08:50

## 2024-11-15 RX ADMIN — LISINOPRIL 5 MG: 10 TABLET ORAL at 08:50

## 2024-11-15 RX ADMIN — ACETAMINOPHEN 650 MG: 325 TABLET, FILM COATED ORAL at 08:45

## 2024-11-15 RX ADMIN — POTASSIUM CHLORIDE 20 MEQ: 1500 TABLET, EXTENDED RELEASE ORAL at 08:51

## 2024-11-15 RX ADMIN — SODIUM CHLORIDE 125 ML/HR: 0.9 INJECTION, SOLUTION INTRAVENOUS at 06:33

## 2024-11-15 RX ADMIN — OXYBUTYNIN CHLORIDE 5 MG: 5 TABLET ORAL at 15:37

## 2024-11-15 RX ADMIN — OXYBUTYNIN CHLORIDE 5 MG: 5 TABLET ORAL at 22:51

## 2024-11-15 RX ADMIN — HYDROMORPHONE HYDROCHLORIDE 0.5 MG: 1 INJECTION, SOLUTION INTRAMUSCULAR; INTRAVENOUS; SUBCUTANEOUS at 04:18

## 2024-11-15 RX ADMIN — CEFTRIAXONE 1000 MG: 2 INJECTION, POWDER, FOR SOLUTION INTRAMUSCULAR; INTRAVENOUS at 03:44

## 2024-11-15 RX ADMIN — FENOFIBRATE 145 MG: 145 TABLET ORAL at 08:50

## 2024-11-15 RX ADMIN — SODIUM CHLORIDE 1000 ML: 0.9 INJECTION, SOLUTION INTRAVENOUS at 01:11

## 2024-11-15 NOTE — ASSESSMENT & PLAN NOTE
Patient was hospitalized in September for labs showing hypercalcemia with Ca level above 11, at which time Rheumatology had been consulted and determined she was probably in a Sarcoid flare and had been prescribed a steroid taper.ACE level was 122 (increased from 117 in August, 191 in May). Vitamin D 1,25 was within normal limits. Her calcium level last checked at the end of September had normalized to 9. She completed the steroid taper that was prescribed in hospital.   Calcium improved to 10.7 with hydration.  May follow-up with rheumatologist  Recent Labs     11/14/24  2242 11/15/24  1327 11/16/24  1125   CALCIUM 12.0* 10.7* 10.0   ALB 4.2  --   --

## 2024-11-15 NOTE — ED PROVIDER NOTES
"Emergency Department Sign Out Note        Sign out and transfer of care from Dr. Esperanza Stovall. See Separate Emergency Department note.     The patient, Danielle Haque, was evaluated by the previous provider for right flank pain.  History of bilateral renal strictures with bilateral ureteral stenting, CKD stage 3 and nephrolithiasis.     Workup Completed:  Fluids  Zofran  Toradol 15 mg for pain  CBC, CMP, UA, lipase, EKG, CT abdomen pelvis with contrast.    ED Course / Workup Pending (followup):  -CT read  -UA  -EKG d/t hypercalcemia  -Dispo: large stone or hydro admit vs discharge with pain control and pain meds                                   ECG 12 Lead Documentation Only    Date/Time: 11/15/2024 6:11 AM    Performed by: Brennen Taveras DO  Authorized by: Brennen Taveras DO    ECG reviewed by me, the ED Provider: yes    Patient location:  ED  Previous ECG:     Previous ECG:  Compared to current    Similarity:  No change    Comparison to cardiac monitor: Yes    Interpretation:     Interpretation: normal    Rate:     ECG rate:  66    ECG rate assessment: normal    Rhythm:     Rhythm: sinus rhythm    Ectopy:     Ectopy: none    QRS:     QRS axis:  Normal    QRS intervals:  Normal  Conduction:     Conduction: normal    ST segments:     ST segments:  Normal  T waves:     T waves: normal      Medical Decision Making  \"   Chief Complaint   Patient presents with    Flank Pain     Hx of stents and kidney stones. C/o R sided flank pain that started several hours ago. Denies fevers or urinary symptoms. Reports taking 5mg of Morphine at 9pm w/ no relief.        Initial ED Assessment: 58-year-old female with a PMH of nephrolithiasis, bilateral ureteral stents, CKD 3A, and sigmoid colon cancer s/p colostomy presenting for sudden onset right-sided flank pain that started 2 hours prior to arrival.     \"Patient's labs notable for: UTI seen on UA, mild anemia to 11.1, hypercalcemia to 12.0, elevated alk phos to 116\", \"Imaging " "revealed: CT Abd/pel: 1.  Multiple pulmonary nodules in the imaged lung bases measuring up to 8 mm are again seen, slightly increased in size and number compared to the prior exam. 2.  Splenomegaly without focal lesions. 3.  Mild left and moderate right hydronephrosis. Nonobstructing calculi in the lower pole of the right kidney. Bilateral ureteral stents noted in place with distal pigtail loops within the posterior inferior bladder and proximal pigtail loops in the left upper pole collecting system and right renal pelvic regions.  Asymmetrical right perinephric hazy inflammatory stranding noted. Recommend correlation with urinalysis to exclude right  tract infection. 4.  Right ventral abdominal ileostomy. Dilated distal ileal loops prior to the ileostomy site containing heterogeneous density debris. Findings may indicate focal ileus or dysmotility. No evidence for bowel obstruction, mesenteric inflammation, appendicitis, free air, or free fluid.\", and \"EKG: Normal sinus rhythm, no sign of acute ischemic change interpreted by myself as above.\"     With right sided moderate hydronephrosis and pyelonephritis seen on CT.  Ceftriaxone 1 g was initiated for broad-spectrum antibiotic coverage.  Urology was contacted and advised the patient stay at [facility] for continued management.     \"Discussed patient's case with Dr. Cutler (Hospitalist) regarding admission who accepted the patient for further evaluation and management.\"          Disposition  Final diagnoses:   Pyelonephritis of right kidney   Hydronephrosis, right   Nausea     Time reflects when diagnosis was documented in both MDM as applicable and the Disposition within this note       Time User Action Codes Description Comment    11/15/2024  4:56 AM Brennen Taveras [N12] Pyelonephritis     11/15/2024  4:57 AM Brennen Taveras [N12] Pyelonephritis of right kidney     11/15/2024  4:57 AM Brennen Taveras Modify [N12] Pyelonephritis of right kidney     " 11/15/2024  4:57 AM Brennen Taveras Remove [N12] Pyelonephritis     11/15/2024  4:57 AM Brennen Taveras [N13.30] Hydronephrosis, right     11/15/2024  4:58 AM Brennen Taveras [R11.0] Nausea     11/15/2024  5:45 AM La Hailey Jes [N13.1] Hydronephrosis due to ureteral stricture           ED Disposition       ED Disposition   Admit    Condition   Stable    Date/Time   Fri Nov 15, 2024  4:56 AM    Comment   Case was discussed with Dr. Rivera and the patient's admission status was agreed to be Admission Status: inpatient status to the service of Dr. Cutler.               Follow-up Information       Follow up With Specialties Details Why Contact Info Additional Information     [facility] Emergency Department Emergency Medicine  As needed, If symptoms worsen [facility] [address]  [phone] [facility] Emergency Department, [address] [place] [address], [address]          Patient's Medications   Discharge Prescriptions    No medications on file     No discharge procedures on file.       ED Provider  Electronically Signed by     Brennen Taveras DO  11/15/24 0613

## 2024-11-15 NOTE — ED ATTENDING ATTESTATION
11/14/2024  I, Jack Canchola MD, saw and evaluated the patient. I have discussed the patient with the resident/non-physician practitioner and agree with the resident's/non-physician practitioner's findings, Plan of Care, and MDM as documented in the resident's/non-physician practitioner's note, except where noted. All available labs and Radiology studies were reviewed.  I was present for key portions of any procedure(s) performed by the resident/non-physician practitioner and I was immediately available to provide assistance.       At this point I agree with the current assessment done in the Emergency Department.  I have conducted an independent evaluation of this patient a history and physical is as follows:    ED Course         Critical Care Time  Procedures       Physical Therapy Visit    Visit Type: Daily Treatment Note  Visit: 3  Referring Provider: MANUELA Martinez  Medical Diagnosis (from order): R29.6 - Falling episodes     SUBJECTIVE                                                                                                               Pt reports she feels it is her medication for blood pressure. Off balance worse with sudden turns in standing, turning in bed needs slow movement. Did fall with no indication of dizziness. The exercises are completed, does not rise from chair no hands. Soreness at knees, anterior. Pained with turning in bed or lifting thighs to chest. The band exercises are completed, some challenge.       OBJECTIVE                                                                                                                                     Treatment     Gait belt applied and used throughout session.  Therapeutic Exercise  Standing with 2.5 pounds at ankles: standing hip abduction and extension- 5 sets of 5 each motion and leg, sitting knee extension x 10 each, hip flexion x 10.   Sit to stand      At balance protection facing treatment table: 2 pounds each hand and tap table side to side 10 repetitions; 2.5 weight in both hands and tap forward and back.  Parallel bars:   Toe taps to a 6\" foam roll  Sidestepping  Balance protection wobble board 6: tock  medial and lateral. Stride standing on the board and rocking, each foot anterior.   Bike for 5 minutes. Seat 6. Knee fatigue after, able to walk to leave.          Skilled input: verbal instruction/cues and demonstration  for clothing adjustments for techniques, hand placement and palpation for techniques and therapist position for techniques as described above and how they are pertinent to the patient's plan of care.  Home Exercise Program  Pelvic exercises. Blue theraband thighs hip abduction and flexing. Knee extension.       ASSESSMENT                                                                                                             Patient tolerated treatment well today. Static and dynamic balance deficits noted. Strengthening addressed as well as the knee limitations.  Pain/symptoms after session (out of 10): 0  Education:   - Results of above outlined education: Needs reinforcement    PLAN                                                                                                                           Suggestions for next session as indicated: Progress per plan of care, Strength and balance.       Therapy procedure time and total treatment time can be found documented on the Time Entry flowsheet

## 2024-11-15 NOTE — ED NOTES
SLIM rounding, per rodo bullock okay for regular diet.  Order updated in epic.  Breakfast order placed per request.       Tad Garza RN  11/15/24 [id number]

## 2024-11-15 NOTE — DISCHARGE SUMMARY
Discharge Summary - Hospitalist   Name: Danielle Haque 58 y.o. female I MRN: [id number]  Unit/Bed#: W -01 I Date of Admission: 11/14/2024   Date of Service: 11/16/2024 I Hospital Day: 1     Assessment & Plan  Acute cystitis with hematuria  Results from last 7 days   Lab Units 11/15/24  0111   LEUKOCYTES UA  Large*   NITRITE UA  Negative   GLUCOSE UA mg/dl Negative   KETONES UA mg/dl Negative   BLOOD UA  Large*   WBC UA /hpf Innumerable*   RBC UA /hpf Innumerable*   BACTERIA UA /hpf Occasional   Patient presenting with right flank pain, dysuria, CT findings suspicious for right-sided pyelonephritis clinically not consistent with pyelonephritis.  Will treat with short course of antibiotics for UTI.  Transition to p.o. Keflex for 3 more days    Plan:  Transition to p.o. Keflex for 3 more days  Bilateral hydronephrosis  Per urology office note: Bilateral ureteral stricture disease, Maintained with bilateral ureteral stents, last exchanged May 10, 2024  - RIGHT proximal ureteral stricture is recalcitrant to endoscopic management, long and heavily calcified- LEFT distal ureteral stricture    CT AP mild left and moderate right hydronephrosis. Nonobstructing calculi in the lower pole of the right kidney. Bilateral ureteral stents in place.    Urology was consulted who did not recommend any stent exchanges.  If she continues to have symptoms they recommended IR guided right sided percutaneous nephrostomy.    Plan:  Stable without any further need for intervention at this time.  Creatinine improved to baseline  Nephrolithiasis  Status post multiple endoscopic procedures, 6 ureteroscopy/stent exchanges performed since January 2023.  Patient feels that her presenting pain is similar to kidney stones she has had in the past.  Pain control as below.    Plan:  Pain control according to geriatric pain protocol:  [id number] q4 prn  not requiring any narcotics over last 24 hours  Hypercalcemia  Patient was hospitalized  in September for labs showing hypercalcemia with Ca level above 11, at which time Rheumatology had been consulted and determined she was probably in a Sarcoid flare and had been prescribed a steroid taper.ACE level was 122 (increased from 117 in August, 191 in May). Vitamin D 1,25 was within normal limits. Her calcium level last checked at the end of September had normalized to 9. She completed the steroid taper that was prescribed in hospital.   Calcium improved to 10.7 with hydration.  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 Follows with heme-onc outpatient, per recent visit  she does not have clinical, laboratory, or imaging evidence of recurrent or metastatic disease   Sarcoidosis  58 y.o. female with pmh of biopsy proven sarcoidosis with pulmonary and hepatic involvement. She denies any respiratory, joint or skin complaints at this time.     CTAP: Multiple pulmonary nodules in the imaged lung bases measuring up to 8 mm are again seen, slightly increased in size and number compared to the prior exam.  Abnormal CT scan  CTAP: Right ventral abdominal ileostomy. Dilated distal ileal loops prior to the ileostomy site containing heterogeneous density debris. Findings may indicate focal ileus or dysmotility. No evidence for bowel obstruction, mesenteric inflammation,   appendicitis, free air, or free fluid.  Patient c/o some increased loose stools in her colostomy bag. Continue to monitor output and quality of stools  Iron deficiency anemia  HB 8.0 , admission hb   No signs of bleeding   Hx of NOLVIA. She undergoes regular colonoscopies due to hx of colon cancer.   Continue to monitor for any signs of bleeding      Medical Problems       Resolved  Problems  Date Reviewed: 9/26/2024   None       Discharging Physician / Practitioner: Wai Rider MD  PCP: Leena Anaya MD  Admission Date:   Admission Orders (From admission, onward)       Ordered        11/15/24 0459  INPATIENT ADMISSION  Once                          Discharge Date: 11/16/24    Consultations During Hospital Stay:  Urology    Procedures Performed:   None    Significant Findings / Test Results:   Mild left and moderate right hydronephrosis. Nonobstructing calculi in the lower pole of the right kidney. Bilateral ureteral stents noted in place with distal pigtail loops within the posterior inferior bladder and proximal pigtail loops in the left upper pole collecting system and right renal pelvic regions. Asymmetrical right perinephric hazy inflammatory stranding noted.     Incidental Findings:   Multiple pulmonary nodules in the imaged lung bases measuring up to 8 mm are again seen, slightly increased in size and number compared to the prior exam.   Splenomegaly without focal lesions.    I reviewed the above mentioned incidental findings with the patient and/or family and they expressed understanding.    Test Results Pending at Discharge (will require follow up):   None     Outpatient Tests Requested:  None    Complications: None    Reason for Admission: Abdominal pain    Hospital Course:   Danielle Haque is a 58 y.o. female patient with a past medical history of sarcoidosis, rectosigmoid cancer s/p laparoscopic diverting colostomy, nephrolithiasis, bilateral ureteral stents, CKD who originally presented to the hospital on 11/14/2024 due to sudden onset right-sided abdominal pain.  Her CT findings were concerning for pyelonephritis.  Urology was consulted for possible stent replacement however they recommended medical management with antibiotics.  There are some discussion of possible IR guided percutaneous nephrostomy if symptoms persisted.  However urology decided not to pursue any  "intervention at this time.  Patient may follow outpatient setting.  She was discharged with 3 days of Keflex.  Clinically not pyelonephritis.  Will treat with 3-day course of Keflex for UTI      Condition at Discharge: good    Discharge Day Visit / Exam:   Subjective: Patient feels good today.  Not having any pain at all.  Vitals: Blood Pressure: 138/66 (11/16/24 0725)  Pulse: 76 (11/16/24 0725)  Temperature: 98.3 °F (36.8 °C) (11/16/24 0725)  Temp Source: Oral (11/15/24 1559)  Respirations: 16 (11/16/24 0725)  Height: 5' 2\" (157.5 cm) (11/15/24 1346)  Weight - Scale: 53.8 kg (118 lb 9.7 oz) (11/15/24 1346)  SpO2: 97 % (11/16/24 0725)  Physical Exam     Gen.-Patient comfortable   Pallor noted  Neck- Supple. No thyromegaly or lymphadenopathy  Lungs-Clear bilaterally without any wheeze or rales   Heart S1-S2, regular rate and rhythm, no murmurs  Abdomen-soft nontender, no organomegaly. Bowel sounds present. ileostomy present  Extremities-no cyanosi,  clubbing or edema  Skin- no rash  Neuro-nonfocal     Discussion with Family: Patient declined call to .     Discharge instructions/Information to patient and family:   See after visit summary for information provided to patient and family.      Provisions for Follow-Up Care:  See after visit summary for information related to follow-up care and any pertinent home health orders.      Mobility at time of Discharge:   Basic Mobility Inpatient Raw Score: 24  JH-HLM Goal: 8: Walk 250 feet or more  JH-HLM Achieved: 8: Walk 250 feet ot more  HLM Goal achieved. Continue to encourage appropriate mobility.     Disposition:   Home    Planned Readmission: No    Discharge Medications:  See after visit summary for reconciled discharge medications provided to patient and/or family.      Administrative Statements   Discharge Statement:  I have spent a total time of 30 minutes in caring for this patient on the day of the visit/encounter. >30 minutes of time was spent on: " Importance of tx compliance, Risk factor reductions, Impressions, and Counseling / Coordination of care.    **Please Note: This note may have been constructed using a voice recognition system**

## 2024-11-15 NOTE — ED PROVIDER NOTES
ED Disposition       None          Assessment & Plan       Medical Decision Making  58-year-old female with a PMH of nephrolithiasis, bilateral ureteral stents, CKD 3A, and sigmoid colon cancer s/p colostomy presenting for sudden onset right-sided flank pain that started 2 hours prior to arrival.     Differential diagnoses: Nephrolithiasis, hydronephrosis, pyelonephritis    Patient presents afebrile and hemodynamically stable, but in acute distress due to severe right-sided flank pain.  Mild anemia, but improved from recent baseline.  Kidney function consistent with baseline.  Mild hyponatremia and hypercalcemia on CMP. IV NS bolus ordered. Lipase within normal limits.  1 dose of IV Toradol and Zofran given with improvement in the patient's pain and nausea. Patient signed out to Dr. Taveras. Please see his note regarding final disposition. Dispo pending CT official read, UA, and EKG.     Amount and/or Complexity of Data Reviewed  Labs: ordered. Decision-making details documented in ED Course.  Radiology: ordered.    Risk  Prescription drug management.        ED Course as of 11/15/24 0128   u Nov 14, 2024   2255 Hemoglobin(!): 11.1  Mild anemia but improved from recent baseline.   2255 WBC: 6.41  No leukocytosis concerning for infection.   2322 GFR, Calculated: 55  GFR consistent with baseline.   2322 Creatinine: 1.11  No signs of THOR.   2323 LIPASE: 80  Lipase within normal limits.   Fri Nov 15, 2024   0000 Patient resting comfortably, pain and nausea managed.  Just finished p.o. contrast, pending CT abdomen/pelvis.   0043 Sodium(!): 130  Mild hyponatremia, ordered IV NS bolus.   0044 Calcium(!): 12.0  Hypercalcemia, EKG ordered to look for any cardiac rhythm changes.   0126 Patient signed out to Dr. Taveras. Resting comfortably with nausea and pain controlled.   Dispo pending CT official read, UA results, and EKG.        Medications   sodium chloride 0.9 % bolus 1,000 mL (1,000 mL Intravenous New Bag 11/15/24 0111)    ketorolac (TORADOL) injection 15 mg (15 mg Intravenous Given 11/14/24 2252)   ondansetron (ZOFRAN) injection 4 mg (4 mg Intravenous Given 11/14/24 2238)   iohexol (OMNIPAQUE) 240 MG/ML solution 50 mL (50 mL Oral Given 11/14/24 2310)   iohexol (OMNIPAQUE) 350 MG/ML injection (MULTI-DOSE) 85 mL (85 mL Intravenous Given 11/15/24 0105)       ED Risk Strat Scores                           SBIRT 22yo+      Flowsheet Row Most Recent Value   Initial Alcohol Screen: US AUDIT-C     1. How often do you have a drink containing alcohol? 0 Filed at: 11/14/2024 2207   2. How many drinks containing alcohol do you have on a typical day you are drinking?  0 Filed at: 11/14/2024 2207   3a. Male UNDER 65: How often do you have five or more drinks on one occasion? 0 Filed at: 11/14/2024 2207   3b. FEMALE Any Age, or MALE 65+: How often do you have 4 or more drinks on one occassion? 0 Filed at: 11/14/2024 2207   Audit-C Score 0 Filed at: 11/14/2024 2207   ENRIQUE: How many times in the past year have you...    Used an illegal drug or used a prescription medication for non-medical reasons? Never Filed at: 11/14/2024 2207                            History of Present Illness       Chief Complaint   Patient presents with    Flank Pain     Hx of stents and kidney stones. C/o R sided flank pain that started several hours ago. Denies fevers or urinary symptoms. Reports taking 5mg of Morphine at 9pm w/ no relief.        Past Medical History:   Diagnosis Date    Bleeding from colostomy stoma (HCC) 2/11/2023     states ileostomy , not colostomy    Cancer (HCC)     Colon cancer (HCC)     Elevated serum creatinine 09/11/2024    Fall     Headache     Hemochromatosis, unspecified     History of transfusion     2022 - no adverse reaction    Hypertension     Hypokalemia 09/12/2024    Kidney calculi     Kidney stone     Liver disease     hemangioma    Muscle weakness     Personal history of COVID-19 12/2022    Sarcoidosis     Seizures (HCC)      2022    Shingles       Past Surgical History:   Procedure Laterality Date    ABSCESS DRAINAGE      left breast    BREAST BIOPSY      CHEST TUBE INSERTION      COLON SURGERY      colostomy    COLONOSCOPY      FL GUIDED CENTRAL VENOUS ACCESS DEVICE INSERTION  03/21/2023    FL RETROGRADE PYELOGRAM  01/23/2023    FL RETROGRADE PYELOGRAM  04/03/2023    FL RETROGRADE PYELOGRAM  7/21/2023    FL RETROGRADE PYELOGRAM  10/11/2023    FL RETROGRADE PYELOGRAM  12/15/2023    FL RETROGRADE PYELOGRAM  2/8/2024    FL RETROGRADE PYELOGRAM  5/10/2024    FL RETROGRADE PYELOGRAM  9/26/2024    FL RETROGRADE PYELOGRAM  10/18/2024    IR BIOPSY LIVER MASS  05/09/2023    LUNG BIOPSY      OK CYSTO BLADDER W/URETERAL CATHETERIZATION Bilateral 07/21/2023    Procedure: CYSTOSCOPY URETEROSCOPY RETROGRADE PYELOGRAM WITH BILATERAL STENT URETERAL EXCHANGE; LEFT LASER LITHOTRIPSY AND STONE BASKET RETRIEVAL;  Surgeon: José Luis Stephens MD;  [facility];  Service: Urology    OK CYSTO BLADDER W/URETERAL CATHETERIZATION Bilateral 12/15/2023    Procedure: CYSTOSCOPY, BILATERAL  RETROGRADE PYELOGRAM , STENT EXCHANGE RIGHT , LEFT URETEROSCOPY ,  HOLMIUM LASER WITH INSERTION LEFT URETERAL STENT;  Surgeon: Derick Bhakta MD;  [facility];  Service: Urology    OK CYSTO BLADDER W/URETERAL CATHETERIZATION Left 2/8/2024    Procedure: CYSTOSCOPY B/L RETROGRADE PYELOGRAM WITH B/L T URETERALSTENT EXCHANGE,LEFT URETEROSCOPY, DILATION LEFT URETERAL STICTURE;  Surgeon: José Luis Stephens MD;  [facility];  Service: Urology    OK CYSTO BLADDER W/URETERAL CATHETERIZATION Bilateral 5/10/2024    Procedure: CYSTOSCOPY RETROGRADE PYELOGRAM WITH INSERTION STENT URETERAL;  Surgeon: José Luis Stephens MD;  [facility];  Service: Urology    OK CYSTO W/INSERT URETERAL STENT Bilateral 5/10/2024    Procedure: EXCHANGE STENT URETERAL;  Surgeon: José Luis Stephens MD;  [facility];  Service: Urology    OK CYSTO  W/INSERT URETERAL STENT Bilateral 10/18/2024    Procedure: EXCHANGE STENT URETERAL;  Surgeon: José Luis Stephens MD;  [facility];  Service: Urology    VT CYSTO/URETERO W/LITHOTRIPSY &INDWELL STENT INSRT Bilateral 01/23/2023    Procedure: CYSTOSCOPY  RIGHT URETEROSCOPY WITH LITHOTRIPSY HOLMIUM LASER, BILATERAL RETROGRADE PYELOGRAM AND BILATERAL  URETERAL STENT INSERTION;  Surgeon: José Luis Stephens MD;  [facility];  Service: Urology    VT CYSTO/URETERO W/LITHOTRIPSY &INDWELL STENT INSRT Right 04/03/2023    Procedure: RIGHT URETEROSCOPY WITH LITHOTRIPSY HOLMIUM LASER, RETROGRADE PYELOGRAM; BILATERAL EXCHANGE STENT URETERAL;  Surgeon: José Luis Stephens MD;  [facility];  Service: Urology    VT CYSTO/URETERO W/LITHOTRIPSY &INDWELL STENT INSRT Bilateral 10/11/2023    Procedure: CYSTOSCOPY URETEROSCOPY RETROGRADE PYELOGRAM AND INSERTION STENT URETERAL DIAGNOSTINC RIGHT URETEROSCOPY, REMOVAL STENT LEFT SIDE;  Surgeon: José Luis Stephens MD;  [facility];  Service: Urology    VT CYSTO/URETERO W/LITHOTRIPSY &INDWELL STENT INSRT Bilateral 9/26/2024    Procedure: CYSTOSCOPY ,LEFT  URETEROSCOPY,  BILATERAL  RETROGRADE PYELOGRAM AND BILATERAL URETERAL STENT EXCHANGE;  Surgeon: Favian Barber MD;  [facility];  Service: Urology    VT CYSTO/URETERO W/LITHOTRIPSY &INDWELL STENT INSRT Left 10/18/2024    Procedure: CYSTOSCOPY LEFT URETEROSCOPY, treatment of LEFT ureteral stricture, RETROGRADE PYELOGRAM AND BILATERAL STENT EXCHANGE;  Surgeon: José Luis Stephens MD;  [facility];  Service: Urology    VT INSJ TUNNELED CTR VAD W/SUBQ PORT AGE 5 YR/> N/A 03/21/2023    Procedure: INSERTION VENOUS PORT ( PORT-A-CATH) IR;  Surgeon: Mark Amos DO;  [facility]: [facility];  Service: Interventional Radiology    VT LAPS COLECTOMY PRTL W/COLOPXTSTMY LW ANAST N/A 8/17/2023    Procedure: RESECTION COLON LOW ANTERIOR LAPAROSCOPIC WITH ROBOTICS;  Surgeon: Sree  MD Erna;  [facility];  Service: Colorectal    TONSILLECTOMY      URINARY SURGERY Bilateral     bilateral stents      Family History   Problem Relation Age of Onset    Cancer Mother     Cirrhosis Father     Breast cancer Neg Hx     Breast cancer additional onset Neg Hx       Social History     Tobacco Use    Smoking status: Never     Passive exposure: Past    Smokeless tobacco: Never   Vaping Use    Vaping status: Never Used   Substance Use Topics    Alcohol use: Never    Drug use: Never      E-Cigarette/Vaping    E-Cigarette Use Never User       E-Cigarette/Vaping Substances    Nicotine No     THC No     CBD No     Flavoring No       I have reviewed and agree with the history as documented.     58-year-old female with a PMH of nephrolithiasis, bilateral ureteral stents, CKD 3A, and sigmoid colon cancer s/p colostomy presenting for sudden onset right-sided flank pain that started 2 hours prior to arrival.  Patient states that the pain is sharp and stabbing in quality and constant since onset.  Pain is nonradiating and localized to her right flank/lower back.  Patient states that this feels similar to her previous kidney stone episodes.  Currently endorsing some nausea.  Denies any fevers, chills, chest pain, shortness of breath, vomiting, abdominal pain, hematuria, or dysuria.  Patient has a right lower quadrant colostomy bag with nonbloody liquidy diarrhea that started today.  Patient took 5 mg of oxycodone at home around 9 PM with no pain relief.      Flank Pain  Associated symptoms: diarrhea and nausea    Associated symptoms: no chest pain, no chills, no dysuria, no fever, no hematuria, no shortness of breath and no vomiting        Review of Systems   Constitutional:  Negative for chills and fever.   Respiratory:  Negative for shortness of breath.    Cardiovascular:  Negative for chest pain.   Gastrointestinal:  Positive for diarrhea and nausea. Negative for abdominal pain and vomiting.    Genitourinary:  Positive for flank pain. Negative for dysuria and hematuria.   Musculoskeletal:  Negative for arthralgias and myalgias.   Skin:  Negative for rash and wound.   Psychiatric/Behavioral:  Negative for confusion.    All other systems reviewed and are negative.          Objective       ED Triage Vitals [11/14/24 2205]   Temperature Pulse Blood Pressure Respirations SpO2 Patient Position - Orthostatic VS   98.5 °F (36.9 °C) 76 132/74 21 97 % --      Temp Source Heart Rate Source BP Location FiO2 (%) Pain Score    Oral -- -- -- 5      Vitals      Date and Time Temp Pulse SpO2 Resp BP Pain Score FACES Pain Rating User   11/15/24 0030 -- 64 98 % -- 127/74 -- -- KB   11/14/24 2252 -- -- -- -- -- 5 -- KB   11/14/24 2245 -- 79 100 % -- 170/81 -- -- KB   11/14/24 2205 98.5 °F (36.9 °C) 76 97 % 21 132/74 5 -- [facility]            Physical Exam  Vitals and nursing note reviewed.   Constitutional:       General: She is in acute distress.      Appearance: Normal appearance. She is normal weight. She is ill-appearing. She is not diaphoretic.   HENT:      Head: Normocephalic and atraumatic.      Right Ear: External ear normal.      Left Ear: External ear normal.      Nose: Nose normal.      Mouth/Throat:      Mouth: Mucous membranes are moist.   Eyes:      General: No scleral icterus.        Right eye: No discharge.         Left eye: No discharge.      Extraocular Movements: Extraocular movements intact.      Conjunctiva/sclera: Conjunctivae normal.   Cardiovascular:      Rate and Rhythm: Normal rate and regular rhythm.      Heart sounds: Normal heart sounds. No murmur heard.  Pulmonary:      Effort: Pulmonary effort is normal. No respiratory distress.      Breath sounds: Normal breath sounds. No wheezing, rhonchi or rales.   Abdominal:      General: Abdomen is flat. There is no distension.      Palpations: Abdomen is soft.      Tenderness: There is no abdominal tenderness. There is right CVA tenderness. There is no left  CVA tenderness, guarding or rebound.      Comments: Right lower quadrant ileostomy bag intact.   Musculoskeletal:         General: Normal range of motion.      Cervical back: Normal range of motion.      Right lower leg: No edema.      Left lower leg: No edema.   Skin:     General: Skin is warm and dry.      Capillary Refill: Capillary refill takes less than 2 seconds.   Neurological:      General: No focal deficit present.      Mental Status: She is alert and oriented to person, place, and time. Mental status is at baseline.   Psychiatric:         Mood and Affect: Mood normal.         Behavior: Behavior normal.         Results Reviewed       Procedure Component Value Units Date/Time    UA w Reflex to Microscopic w Reflex to Culture [[id number]]  (Abnormal) Collected: 11/15/24 0111    Lab Status: Final result Specimen: Urine, Clean Catch Updated: 11/15/24 0125     Color, UA Light Yellow     Clarity, UA Turbid     Specific Gravity, UA 1.010     pH, UA 5.5     Leukocytes, UA Large     Nitrite, UA Negative     Protein, UA Trace mg/dl      Glucose, UA Negative mg/dl      Ketones, UA Negative mg/dl      Urobilinogen, UA <2.0 mg/dl      Bilirubin, UA Negative     Occult Blood, UA Large    Urine Microscopic [[id number]] Collected: 11/15/24 0111    Lab Status: In process Specimen: Urine, Clean Catch Updated: 11/15/24 0125    Comprehensive metabolic panel [[id number]]  (Abnormal) Collected: 11/14/24 2242    Lab Status: Final result Specimen: Blood from Arm, Left Updated: 11/14/24 2316     Sodium 130 mmol/L      Potassium 4.2 mmol/L      Chloride 100 mmol/L      CO2 22 mmol/L      ANION GAP 8 mmol/L      BUN 22 mg/dL      Creatinine 1.11 mg/dL      Glucose 121 mg/dL      Calcium 12.0 mg/dL      AST 27 U/L      ALT 19 U/L      Alkaline Phosphatase 116 U/L      Total Protein 7.7 g/dL      Albumin 4.2 g/dL      Total Bilirubin 0.62 mg/dL      eGFR 55 ml/min/1.73sq m     Narrative:      National [employer] guidelines  for Chronic Kidney Disease (CKD):     Stage 1 with normal or high GFR (GFR > 90 mL/min/1.73 square meters)    Stage 2 Mild CKD (GFR = 60-89 mL/min/1.73 square meters)    Stage 3A Moderate CKD (GFR = 45-59 mL/min/1.73 square meters)    Stage 3B Moderate CKD (GFR = 30-44 mL/min/1.73 square meters)    Stage 4 Severe CKD (GFR = 15-29 mL/min/1.73 square meters)    Stage 5 End Stage CKD (GFR <15 mL/min/1.73 square meters)  Note: GFR calculation is accurate only with a steady state creatinine    Lipase [[id number]]  (Normal) Collected: 11/14/24 2242    Lab Status: Final result Specimen: Blood from Arm, Left Updated: 11/14/24 2316     Lipase 80 u/L     CBC and differential [[id number]]  (Abnormal) Collected: 11/14/24 2242    Lab Status: Final result Specimen: Blood from Arm, Left Updated: 11/14/24 2250     WBC 6.41 Thousand/uL      RBC 3.73 Million/uL      Hemoglobin 11.1 g/dL      Hematocrit 33.4 %      MCV 90 fL      MCH 29.8 pg      MCHC 33.2 g/dL      RDW 14.2 %      MPV 9.2 fL      Platelets 259 Thousands/uL      nRBC 0 /100 WBCs      Segmented % 80 %      Immature Grans % 0 %      Lymphocytes % 6 %      Monocytes % 7 %      Eosinophils Relative 7 %      Basophils Relative 0 %      Absolute Neutrophils 5.14 Thousands/µL      Absolute Immature Grans 0.02 Thousand/uL      Absolute Lymphocytes 0.35 Thousands/µL      Absolute Monocytes 0.43 Thousand/µL      Eosinophils Absolute 0.45 Thousand/µL      Basophils Absolute 0.02 Thousands/µL             CT abdomen pelvis with contrast    (Results Pending)       Procedures    ED Medication and Procedure Management   Prior to Admission Medications   Prescriptions Last Dose Informant Patient Reported? Taking?   acetaminophen (TYLENOL) 325 mg tablet  Self No No   Sig: Take 2 tablets (650 mg total) by mouth every 4 (four) hours as needed for mild pain   cyanocobalamin (VITAMIN B-12) 1000 MCG tablet  Self Yes No   Sig: Take 1,000 mcg by mouth daily   docusate sodium (COLACE) 100 mg  capsule  Self No No   Sig: Take 1 capsule (100 mg total) by mouth 2 (two) times a day for 15 days   estrogens, conjugated (Premarin) vaginal cream  Self No No   Sig: Insert 0.3 g into the vagina 3 (three) times a week   fenofibrate (TRICOR) 145 mg tablet  Self No No   Sig: Take 1 tablet (145 mg total) by mouth daily   ferrous sulfate 324 (65 Fe) mg   No No   Sig: TAKE 1 TABLET (324 MG TOTAL) BY MOUTH DAILY BEFORE BREAKFAST   lisinopril (ZESTRIL) 5 mg tablet   No No   Sig: Take 1 tablet (5 mg total) by mouth daily   naloxone (NARCAN) 4 mg/0.1 mL nasal spray  Self No No   Sig: Administer 1 spray into a nostril. If no response after 2-3 minutes, give another dose in the other nostril using a new spray. For use in emergencies for opioid / pain medication reversal for accidental ingestion, respiratory depression, sedation or concerns for overdose.   ondansetron (ZOFRAN) 4 mg tablet  Self No No   Sig: Take 1 tablet (4 mg total) by mouth every 8 (eight) hours as needed for nausea or vomiting   oxyCODONE (Roxicodone) 5 immediate release tablet   No No   Sig: Take 1 tablet (5 mg total) by mouth every 4 (four) hours as needed for moderate pain Max Daily Amount: 30 mg   oxybutynin (DITROPAN) 5 mg tablet  Self No No   Sig: Take 1 tablet (5 mg total) by mouth every 6 (six) hours as needed (bladder spasms)   oxybutynin (DITROPAN) 5 mg tablet   No No   Sig: TAKE 1 TABLET (5 MG TOTAL) BY MOUTH EVERY 6 (SIX) HOURS AS NEEDED (BLADDER SPASMS)   potassium chloride (MICRO-K) 10 MEQ CR capsule   No No   Sig: TAKE 4 CAPSULES (40 MEQ TOTAL) BY MOUTH 2 (TWO) TIMES A DAY   tamsulosin (FLOMAX) 0.4 mg  Self No No   Sig: Take 1 capsule (0.4 mg total) by mouth daily with dinner   tamsulosin (FLOMAX) 0.4 mg  Self No No   Sig: Take 1 capsule (0.4 mg total) by mouth daily with dinner   ursodiol (ACTIGALL) 300 mg capsule   No No   Sig: Take 3 capsules (900 mg total) by mouth in the morning      Facility-Administered Medications: None     Patient's  Medications   Discharge Prescriptions    No medications on file     No discharge procedures on file.  ED SEPSIS DOCUMENTATION            Esperanza Stovall MD  11/15/24 0127

## 2024-11-15 NOTE — ASSESSMENT & PLAN NOTE
Status post multiple endoscopic procedures, 6 ureteroscopy/stent exchanges performed since January 2023.  Patient feels that her presenting pain is similar to kidney stones she has had in the past.  Pain control as below.    Plan:  Pain control according to geriatric pain protocol:  [id number] q4 prn  Oxycodone 5 mg q.4h p.r.n. Moderate pain  Oxycodone 10 mg q.4h p.r.n. Severe pain  Dilaudid 0.5 mg IV q4h p.r.n. Breakthrough pain

## 2024-11-15 NOTE — CONSULTS
Consultation - Urology   Name: Danielle Haque 58 y.o. female I MRN: [id number]  Unit/Bed#: W -01 I Date of Admission: 11/14/2024   Date of Service: 11/15/2024 I Hospital Day: 0   Inpatient consult to Urology  Consult performed by: Brittany Miles PA-C  Consult ordered by: Hailey Tovar MD        Physician Requesting Evaluation: Nora Cutler MD   Reason for Evaluation / Principal Problem: bilateral hydronephrosis    Assessment & Plan  Pyelonephritis  Presenting with right-sided flank pain, dysuria, CT findings suspicious for right-sided pyelonephritis.  -UA is positive however in setting of bilateral ureteral stents  -Vital signs stable, afebrile  -Cr increased to 1.36 this afternoon  -Pain is improving    Plan:   -Trend labs  -Treat empirically  -F/u cultures  -Worsening R sided hydro and elevation in Cr. Trend Cr tomorrow. If worsning would consult IR for R PCN placement.   Bilateral hydronephrosis  Down bilateral ureteral strictures follows with Dr. Stephens outpatient.  Status post cystoscopy, left ureteroscopy, balloon dilatation of left-sided stricture.  Plan to remove ureteral stent outpatient for trial    Right-sided proximal ureteral stricture plan to refer to reconstructive urology after her ileostomy reversal    -On review of CT scan she has worsening right-sided moderate hydronephrosis.  Comparison to previous CT scan in October mild right-sided hydronephrosis    -Repeat lab work this afternoon creatinine 1.36, baseline 0.8-1.0    -Plan   -If worsening creatinine tomorrow or intractable right-sided flank pain, consult IR for right PCN placement    Subjective:   HPI: Jackie is a 58-year-old female past medical history of rectal cancer, recurrent nephrolithiasis, multiple ureteroscopic interventions.  She has bilateral ureteral stricture disease maintained with bilateral ureteral stents.  She has a right proximal ureteral stricture recalcitrant to multiple endoscopic management and would require  "reconstructive procedure on the right side.    On the left side she underwent cystoscopy, left ureteroscopy with treatment of ureteral stricture, balloon dilatation with Dr. Stephens on 10/18/2024.  After procedure the plan was offering a trial without ureteral stent on the left side for 6 weeks.    Patient is pending reversal of her ileostomy, would leave stents in place until after her procedure.  Plan is to refer her to reconstructive urology for her right proximal ureteral stricture after she has her ileostomy reversal.    Patient presented to the ED with acute onset of right flank pain.  She denies any fever, chills, abdominal pain.  She notes some dysuria.  In the ED CT scan was done which showed mild left and moderate right hydronephrosis.  On exam patient reports improvement since admission on her right-sided flank pain.  She denies any difficulty urinating.  Her dysuria has improved.  We discussed worsening right-sided hydronephrosis, recent stent exchange.  If worsening flank pain or creatinine would recommend right-sided PCN placement by IR.  Eventual referral to reconstructive urology for her right proximal stricture.    Review of Systems   Constitutional:  Negative for chills and fever.   Respiratory:  Negative for cough and shortness of breath.    Cardiovascular:  Negative for chest pain and palpitations.   Gastrointestinal:  Negative for abdominal pain and vomiting.   Genitourinary:  Negative for dysuria and hematuria.   Musculoskeletal:  Negative for arthralgias and back pain.   Skin:  Negative for color change and rash.   Neurological:  Negative for seizures and syncope.   All other systems reviewed and are negative.      Objective:    Vitals: Blood pressure 119/79, pulse 78, temperature 98.9 °F (37.2 °C), resp. rate 17, height 5' 2\" (1.575 m), weight 53.8 kg (118 lb 9.7 oz), SpO2 98%.,Body mass index is 21.69 kg/m².    Physical Exam  Vitals reviewed.   Constitutional:       General: She is not in " acute distress.     Appearance: Normal appearance. She is normal weight. She is not ill-appearing, toxic-appearing or diaphoretic.   HENT:      Head: Normocephalic and atraumatic.      Right Ear: External ear normal.      Left Ear: External ear normal.      Nose: Nose normal.      Mouth/Throat:      Mouth: Mucous membranes are moist.   Eyes:      General: No scleral icterus.     Conjunctiva/sclera: Conjunctivae normal.   Cardiovascular:      Rate and Rhythm: Normal rate.      Pulses: Normal pulses.   Pulmonary:      Effort: Pulmonary effort is normal.   Abdominal:      General: There is no distension.      Tenderness: There is no abdominal tenderness. There is right CVA tenderness. There is no left CVA tenderness or guarding.   Neurological:      General: No focal deficit present.      Mental Status: She is alert and oriented to person, place, and time. Mental status is at baseline.   Psychiatric:         Mood and Affect: Mood normal.         Behavior: Behavior normal.         Thought Content: Thought content normal.         Judgment: Judgment normal.         Imaging:  CT ABDOMEN AND PELVIS WITH IV CONTRAST     INDICATION: sudden onset right flank pain x2h, hx of kidney stones, ureteral stents, sigmoid colon cancer with ostomy. .     COMPARISON: CT chest abdomen pelvis on 7/3/2024 and 10/2/2024     TECHNIQUE: CT examination of the abdomen and pelvis was performed. Multiplanar 2D reformatted images were created from the source data.     This examination, like all CT scans performed in the [facility], was performed utilizing techniques to minimize radiation dose exposure, including the use of iterative reconstruction and automated exposure control. Radiation dose length   product (DLP) for this visit: 339 mGy-cm     IV Contrast: 85 mL of iohexol (OMNIPAQUE) 50 mL of iohexol (OMNIPAQUE)  Enteric Contrast: Not administered.     FINDINGS:     ABDOMEN     LOWER CHEST: Multiple pulmonary nodules in the  imaged lung bases measuring up to 8 mm are again seen, slightly increased in size and number compared to the prior exam. No pleural or pericardial effusions.     LIVER/BILIARY TREE: Unremarkable.     GALLBLADDER: No calcified gallstones. No pericholecystic inflammatory change.     SPLEEN: Splenomegaly without focal lesions     PANCREAS: Unremarkable.     ADRENAL GLANDS: Splenomegaly without focal lesions.     KIDNEYS/URETERS: Kidneys normal in size and position. Mild left and moderate right hydronephrosis. No perinephric fluid collections. Multiple lower pelvic phleboliths. Asymmetrical right perinephric hazy inflammatory stranding noted. Bilateral ureteral   stents noted in place with distal pigtail loops within the posterior inferior bladder and proximal pigtail loops in the left upper pole collecting system and right renal pelvic regions.     STOMACH AND BOWEL: Stomach is moderately distended with air and oral contrast/debris. The administered oral contrast has reached pelvic distal ileal loops. No findings of bowel obstruction or mesenteric inflammation noted. Small and large bowel loops are   normal in course and caliber without evidence for obstruction. Right ventral abdominal ileostomy.     APPENDIX: No findings to suggest appendicitis.     ABDOMINOPELVIC CAVITY: No ascites or loculated fluid collection. No pneumoperitoneum. No lymphadenopathy by size criteria. Nonspecific subcentimeter left retroperitoneal/para-aortic lymph nodes are again seen..     VESSELS: Atherosclerosis without abdominal aortic aneurysm.     PELVIS     REPRODUCTIVE ORGANS: Unremarkable for patient's age.     URINARY BLADDER: No intraluminal mass/calculi or irregular wall thickening. Distal pigtail loops of the bilateral ureteral stents noted within the bladder lumen.     ABDOMINAL WALL/INGUINAL REGIONS: Unremarkable.     BONES: No acute fracture or suspicious osseous lesion.     IMPRESSION:     1.  Multiple pulmonary nodules in the  imaged lung bases measuring up to 8 mm are again seen, slightly increased in size and number compared to the prior exam.  2.  Splenomegaly without focal lesions.  3.  Mild left and moderate right hydronephrosis. Nonobstructing calculi in the lower pole of the right kidney. Bilateral ureteral stents noted in place with distal pigtail loops within the posterior inferior bladder and proximal pigtail loops in the left   upper pole collecting system and right renal pelvic regions.  Asymmetrical right perinephric hazy inflammatory stranding noted. Recommend correlation with urinalysis to exclude right  tract infection.  4.  Right ventral abdominal ileostomy. Dilated distal ileal loops prior to the ileostomy site containing heterogeneous density debris. Findings may indicate focal ileus or dysmotility. No evidence for bowel obstruction, mesenteric inflammation,   appendicitis, free air, or free fluid.        Workstation performed: [id number]  Labs:  Recent Labs     11/14/24 2242 11/15/24  1327   WBC 6.41 5.66       Recent Labs     11/14/24 2242 11/15/24  1327   HGB 11.1* 10.3*     Recent Labs     11/14/24 2242 11/15/24  1327   HCT 33.4* 32.0*     Recent Labs     11/14/24 2242 11/15/24  1327   CREATININE 1.11 1.36*         History:    Past Medical History:   Diagnosis Date    Bleeding from colostomy stoma (HCC) 2/11/2023     states ileostomy , not colostomy    Cancer (HCC)     Colon cancer (HCC)     Elevated serum creatinine 09/11/2024    Fall     Headache     Hemochromatosis, unspecified     History of transfusion     2022 - no adverse reaction    Hypertension     Hypokalemia 09/12/2024    Kidney calculi     Kidney stone     Liver disease     hemangioma    Muscle weakness     Personal history of COVID-19 12/2022    Sarcoidosis     Seizures (HCC)     2022    Shingles      Social History     Socioeconomic History    Marital status:      Spouse name: None    Number of children: None    Years of education:  None    Highest education level: None   Occupational History    None   Tobacco Use    Smoking status: Never     Passive exposure: Past    Smokeless tobacco: Never   Vaping Use    Vaping status: Never Used   Substance and Sexual Activity    Alcohol use: Never    Drug use: Never    Sexual activity: Not Currently   Other Topics Concern    None   Social History Narrative    From 4/14/23  note:    Emergency Contact: MATEO TREVIZO (ex-) [phone] (Mobile)    Marital Status:     Caregiver/Support: ex- -Mateo and two dtrs.     Children: two dtrs- nataliia 21 in [place] and doretha 18 [facility]    Child/Elder care: no     Housing: two story house    Home Setup: one level    Lives With:  -mateo and two dtrs    Daily Living Activities: independent    Durable Medical Equipment: No    Ambulation: independent    Employment: disabled-SSI-$100 and pension-$700     Status/Location: no     Ability to pay bills: yes. No barriers to paying monthly bills.      POA/LW/AD: no.  - Mateo is healthcare representative.  MSW emailed advance directive.          Social Drivers of Health     Financial Resource Strain: Low Risk  (11/30/2022)    Received from [employer], [employer]    Financial Resource Strain     In the last 12 months, did you worry that your food could run out before you got money to buy more? : No   Food Insecurity: No Food Insecurity (11/15/2024)    Nursing - Inadequate Food Risk Classification     Worried About Running Out of Food in the Last Year: Never true     Ran Out of Food in the Last Year: Never true     Ran Out of Food in the Last Year: 1   Transportation Needs: No Transportation Needs (11/15/2024)    Nursing - Transportation Risk Classification     Lack of Transportation: Not on file     Lack of Transportation: 2   Physical Activity: Not on file   Stress: Not on file   Social Connections: Not on file   Intimate Partner  Violence: Unknown (11/15/2024)    Nursing IPS     Feels Physically and Emotionally Safe: Not on file     Physically Hurt by Someone: Not on file     Humiliated or Emotionally Abused by Someone: Not on file     Physically Hurt by Someone: 2     Hurt or Threatened by Someone: 2   Housing Stability: Unknown (11/15/2024)    Nursing: Inadequate Housing Risk Classification     Has Housing: Not on file     Worried About Losing Housing: Not on file     Unable to Get Utilities: Not on file     Unable to Pay for Housing in the Last Year: 2     Has Housin     Past Surgical History:   Procedure Laterality Date    ABSCESS DRAINAGE      left breast    BREAST BIOPSY      CHEST TUBE INSERTION      COLON SURGERY      colostomy    COLONOSCOPY      FL GUIDED CENTRAL VENOUS ACCESS DEVICE INSERTION  2023    FL RETROGRADE PYELOGRAM  2023    FL RETROGRADE PYELOGRAM  2023    FL RETROGRADE PYELOGRAM  2023    FL RETROGRADE PYELOGRAM  10/11/2023    FL RETROGRADE PYELOGRAM  12/15/2023    FL RETROGRADE PYELOGRAM  2024    FL RETROGRADE PYELOGRAM  5/10/2024    FL RETROGRADE PYELOGRAM  2024    FL RETROGRADE PYELOGRAM  10/18/2024    IR BIOPSY LIVER MASS  2023    LUNG BIOPSY      MA CYSTO BLADDER W/URETERAL CATHETERIZATION Bilateral 2023    Procedure: CYSTOSCOPY URETEROSCOPY RETROGRADE PYELOGRAM WITH BILATERAL STENT URETERAL EXCHANGE; LEFT LASER LITHOTRIPSY AND STONE BASKET RETRIEVAL;  Surgeon: José Luis Stephens MD;  [facility];  Service: Urology    MA CYSTO BLADDER W/URETERAL CATHETERIZATION Bilateral 12/15/2023    Procedure: CYSTOSCOPY, BILATERAL  RETROGRADE PYELOGRAM , STENT EXCHANGE RIGHT , LEFT URETEROSCOPY ,  HOLMIUM LASER WITH INSERTION LEFT URETERAL STENT;  Surgeon: Derick Bhakta MD;  [facility];  Service: Urology    MA CYSTO BLADDER W/URETERAL CATHETERIZATION Left 2024    Procedure: CYSTOSCOPY B/L RETROGRADE PYELOGRAM WITH B/L T URETERALSTENT  EXCHANGE,LEFT URETEROSCOPY, DILATION LEFT URETERAL STICTURE;  Surgeon: José Luis Stephens MD;  [facility];  Service: Urology    UT CYSTO BLADDER W/URETERAL CATHETERIZATION Bilateral 5/10/2024    Procedure: CYSTOSCOPY RETROGRADE PYELOGRAM WITH INSERTION STENT URETERAL;  Surgeon: José Luis Stephens MD;  [facility];  Service: Urology    UT CYSTO W/INSERT URETERAL STENT Bilateral 5/10/2024    Procedure: EXCHANGE STENT URETERAL;  Surgeon: José Luis Stephens MD;  [facility];  Service: Urology    UT CYSTO W/INSERT URETERAL STENT Bilateral 10/18/2024    Procedure: EXCHANGE STENT URETERAL;  Surgeon: José Luis Stephens MD;  [facility];  Service: Urology    UT CYSTO/URETERO W/LITHOTRIPSY &INDWELL STENT INSRT Bilateral 01/23/2023    Procedure: CYSTOSCOPY  RIGHT URETEROSCOPY WITH LITHOTRIPSY HOLMIUM LASER, BILATERAL RETROGRADE PYELOGRAM AND BILATERAL  URETERAL STENT INSERTION;  Surgeon: José Luis Stephens MD;  [facility];  Service: Urology    UT CYSTO/URETERO W/LITHOTRIPSY &INDWELL STENT INSRT Right 04/03/2023    Procedure: RIGHT URETEROSCOPY WITH LITHOTRIPSY HOLMIUM LASER, RETROGRADE PYELOGRAM; BILATERAL EXCHANGE STENT URETERAL;  Surgeon: José Luis Stephens MD;  [facility];  Service: Urology    UT CYSTO/URETERO W/LITHOTRIPSY &INDWELL STENT INSRT Bilateral 10/11/2023    Procedure: CYSTOSCOPY URETEROSCOPY RETROGRADE PYELOGRAM AND INSERTION STENT URETERAL DIAGNOSTINC RIGHT URETEROSCOPY, REMOVAL STENT LEFT SIDE;  Surgeon: José Luis Stephens MD;  [facility];  Service: Urology    UT CYSTO/URETERO W/LITHOTRIPSY &INDWELL STENT INSRT Bilateral 9/26/2024    Procedure: CYSTOSCOPY ,LEFT  URETEROSCOPY,  BILATERAL  RETROGRADE PYELOGRAM AND BILATERAL URETERAL STENT EXCHANGE;  Surgeon: Favian Barber MD;  [facility];  Service: Urology    UT CYSTO/URETERO W/LITHOTRIPSY &INDWELL STENT INSRT Left 10/18/2024    Procedure: CYSTOSCOPY  LEFT URETEROSCOPY, treatment of LEFT ureteral stricture, RETROGRADE PYELOGRAM AND BILATERAL STENT EXCHANGE;  Surgeon: José Luis Stephens MD;  [facility];  Service: Urology    IN INSJ TUNNELED CTR VAD W/SUBQ PORT AGE 5 YR/> N/A 03/21/2023    Procedure: INSERTION VENOUS PORT ( PORT-A-CATH) IR;  Surgeon: Mark Amos DO;  [facility];  Service: Interventional Radiology    IN LAPS COLECTOMY PRTL W/COLOPXTSTMY LW ANAST N/A 8/17/2023    Procedure: RESECTION COLON LOW ANTERIOR LAPAROSCOPIC WITH ROBOTICS;  Surgeon: Sree Umanzor MD;  [facility];  Service: Colorectal    TONSILLECTOMY      URINARY SURGERY Bilateral     bilateral stents     Family History   Problem Relation Age of Onset    Cancer Mother     Cirrhosis Father     Breast cancer Neg Hx     Breast cancer additional onset Neg Hx        Brittany Miles PA-C  Date: 11/15/2024 Time: 2:30 PM

## 2024-11-15 NOTE — ASSESSMENT & PLAN NOTE
Presenting with right-sided flank pain, dysuria, CT findings suspicious for right-sided pyelonephritis.  -UA is positive however in setting of bilateral ureteral stents  -Vital signs stable, afebrile  -Cr increased to 1.36 this afternoon  -Pain is improving    Plan:   -Trend labs  -Treat empirically  -F/u cultures  -Worsening R sided hydro and elevation in Cr. Trend Cr tomorrow. If maria ening would consult IR for R PCN placement.

## 2024-11-15 NOTE — ASSESSMENT & PLAN NOTE
Status post multiple endoscopic procedures, 6 ureteroscopy/stent exchanges performed since January 2023.  Patient feels that her presenting pain is similar to kidney stones she has had in the past.  Pain control as below.    Plan:  Pain control according to geriatric pain protocol:  [id number] q4 prn  not requiring any narcotics over last 24 hours

## 2024-11-15 NOTE — HOSPITAL COURSE
ANDREA is a 58 year old F with PMHx of recurrent nephrolithiasis, bilateral ureteral stents, CKD3A, sigmoid colon cancer s/p colectomy with colostomy, and sacroidosis who presents sudden onset R flank pain that started 2 hours before arrival to the ED. In the ED, she was afrebrile and vital signs stable. Toradol and Dilaudid were given for the pain. CMP was significant for low Na (130), high Ca (12), high ALP (119), Cr at a baseline of 1.1, mild anemia of 11.1. Lipase was normal (80). UA showed large amounts of leukocytes and blood with innumerable WBCs and RBCs. CTAP showed multiple pulmonary nodules in the lung bases, splenomegaly, mild L and moderate R hydronephrosis with nonobstructing caculi in the lower pole of the R kidney and patent ureteral stents. She was given IV ceftriaxone 1 g daily and IV fluids 125cc/hr for suspected pyelonephritis. Urology was consulted and they recommended___.

## 2024-11-15 NOTE — ASSESSMENT & PLAN NOTE
Results from last 7 days   Lab Units 11/15/24  0111   LEUKOCYTES UA  Large*   NITRITE UA  Negative   GLUCOSE UA mg/dl Negative   KETONES UA mg/dl Negative   BLOOD UA  Large*   WBC UA /hpf Innumerable*   RBC UA /hpf Innumerable*   BACTERIA UA /hpf Occasional   Patient presenting with right flank pain, dysuria, CT findings suspicious for right-sided pyelonephritis clinically not consistent with pyelonephritis.  Will treat with short course of antibiotics for UTI.  Transition to p.o. Keflex for 3 more days    Plan:  Transition to p.o. Keflex for 3 more days

## 2024-11-15 NOTE — TELEPHONE ENCOUNTER
Spoke with Pt's brother, Mateo Haque, via phone call today @  [phone]. Mr. Haque informed that Pt has been rescheduled for a follow up appointment for Hepatology with Dr. Oscar Reyes on 11/21/24 at the [address] [place] [address] office location.  Pt is currently hospitalized, however, Mr. Haque plans to visit Pt in hospital and will contact office in the event Pt is unable to attend rescheduled appointment.  Mr. Haque states he does not want to cancel appointment for 11/21/24 at this time.  [employer] message sent and appointment letter mailed to Pt's address in chart per Mr. Haque's request.

## 2024-11-15 NOTE — DISCHARGE INSTR - AVS FIRST PAGE
Dear Danielle Haque,     It was our pleasure to care for you here at [facility].  It is our hope that we were always able to exceed the expected standards for your care during your stay.  You were hospitalized due to right-sided abdominal pain.  You were cared for on the fourth floor by Sanjay Garnica MD under the service of Nora Cutler MD with the [facility] who covers for your primary care physician (PCP), Leena Anaya MD, while you were hospitalized.  If you have any questions or concerns related to this hospitalization, you may contact us at .  For follow up as well as any medication refills, we recommend that you follow up with your primary care physician.  A registered nurse will reach out to you by phone within a few days after your discharge to answer any additional questions that you may have after going home.  However, at this time we provide for you here, the most important instructions / recommendations at discharge:       Notable Medication Adjustments -   Please take ceftriaxone for***  Testing Required after Discharge -   ***  ** Please contact your PCP to request testing orders for any of the testing recommended here **  Important follow up information -   Please follow-up with your PCP within a week of discharge  Other Instructions -   Please take all your medications regularly as directed  If symptoms return/persist contact your PCP if unable then visit to nearest ER    Please review this entire after visit summary as additional general instructions including medication list, appointments, activity, diet, any pertinent wound care, and other additional recommendations from your care team that may be provided for you.      Sincerely,   Sanjay Garnica MD

## 2024-11-15 NOTE — ASSESSMENT & PLAN NOTE
Down bilateral ureteral strictures follows with Dr. Stephens outpatient.  Status post cystoscopy, left ureteroscopy, balloon dilatation of left-sided stricture.  Plan to remove ureteral stent outpatient for trial    Right-sided proximal ureteral stricture plan to refer to reconstructive urology after her ileostomy reversal    -On review of CT scan she has worsening right-sided moderate hydronephrosis.  Comparison to previous CT scan in October mild right-sided hydronephrosis    -Repeat lab work this afternoon creatinine 1.36, baseline 0.8-1.0    -Plan   -If worsening creatinine tomorrow or intractable right-sided flank pain, consult IR for right PCN placement

## 2024-11-15 NOTE — ASSESSMENT & PLAN NOTE
Patient was hospitalized in September for labs showing hypercalcemia with Ca level above 11, at which time Rheumatology had been consulted and determined she was probably in a Sarcoid flare and had been prescribed a steroid taper.ACE level was 122 (increased from 117 in August, 191 in May). Vitamin D 1,25 was within normal limits. Her calcium level last checked at the end of September had normalized to 9. She completed the steroid taper that was prescribed in hospital.   Recent Labs     11/14/24  2242   CALCIUM 12.0*   ALB 4.2     Consider rheumatology/nephrology consult if patient's calcium remains persistently high despite IV fluids    Plan:  Fluids:  Normal saline 125 mL/hr

## 2024-11-15 NOTE — H&P
H&P - Hospitalist   Name: Danielle Haque 58 y.o. female I MRN: [id number]  Unit/Bed#: ED-30 I Date of Admission: 11/14/2024   Date of Service: 11/15/2024 I Hospital Day: 0     Assessment & Plan  Pyelonephritis  Results from last 7 days   Lab Units 11/15/24  0111   LEUKOCYTES UA  Large*   NITRITE UA  Negative   GLUCOSE UA mg/dl Negative   KETONES UA mg/dl Negative   BLOOD UA  Large*   WBC UA /hpf Innumerable*   RBC UA /hpf Innumerable*   BACTERIA UA /hpf Occasional   Patient presenting with right flank pain, dysuria, CT findings suspicious for right-sided pyelonephritis    Plan:  Continue 1 g IV ceftriaxone daily  Follow-up urine cultures and sensitivities  Normal saline 125 mL/hr  Bilateral hydronephrosis  Per urology office note: Bilateral ureteral stricture disease, Maintained with bilateral ureteral stents, last exchanged May 10, 2024  - RIGHT proximal ureteral stricture is recalcitrant to endoscopic management, long and heavily calcified- LEFT distal ureteral stricture    CT AP mild left and moderate right hydronephrosis. Nonobstructing calculi in the lower pole of the right kidney. Bilateral ureteral stents in place.    Plan:  Continue PTA Flomax 0.4 mg nightly  Continue PTA oxybutynin every 6 hours as needed for bladder spasms  Follow-up urology consultation  N.p.o. for potential urology intervention  Nephrolithiasis  Status post multiple endoscopic procedures, 6 ureteroscopy/stent exchanges performed since January 2023.  Patient feels that her presenting pain is similar to kidney stones she has had in the past.  Pain control as below.    Plan:  Pain control according to geriatric pain protocol:  [id number] q4 prn  Oxycodone 5 mg q.4h p.r.n. Moderate pain  Oxycodone 10 mg q.4h p.r.n. Severe pain  Dilaudid 0.5 mg IV q4h p.r.n. Breakthrough pain   Hypercalcemia  Patient was hospitalized in September for labs showing hypercalcemia with Ca level above 11, at which time Rheumatology had been consulted and  determined she was probably in a Sarcoid flare and had been prescribed a steroid taper.ACE level was 122 (increased from 117 in August, 191 in May). Vitamin D 1,25 was within normal limits. Her calcium level last checked at the end of September had normalized to 9. She completed the steroid taper that was prescribed in hospital.   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 Follows with heme-onc outpatient, per recent visit  she does not have clinical, laboratory, or imaging evidence of recurrent or metastatic disease   Sarcoidosis  58 y.o. female with pmh of biopsy proven sarcoidosis with pulmonary and hepatic involvement. She denies any respiratory, joint or skin complaints at this time.     CTAP: Multiple pulmonary nodules in the imaged lung bases measuring up to 8 mm are again seen, slightly increased in size and number compared to the prior exam.  Abnormal CT scan  CTAP: Right ventral abdominal ileostomy. Dilated distal ileal loops prior to the ileostomy site containing heterogeneous density debris. Findings may indicate focal ileus or dysmotility. No evidence for bowel obstruction, mesenteric inflammation,   appendicitis, free air, or free fluid.  Patient c/o some increased loose stools in her colostomy bag. Continue to monitor output and quality of stools      VTE Pharmacologic Prophylaxis:   Moderate Risk (Score 3-4) - Pharmacological DVT Prophylaxis Ordered: enoxaparin (Lovenox).  Code Status: Level 1 - Full Code patient  Discussion with family:  patient requested call to her .     Anticipated Length of Stay: Patient will be admitted on an inpatient basis with an  anticipated length of stay of greater than 2 midnights secondary to pyelonephritis.    History of Present Illness   Chief Complaint: R flank pain    Danielle Haque is a 58 y.o. 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 She feels as though this is similar to kidney stones she has had in the past.  She received doses of Toradol and Dilaudid, by my evaluation her right sided flank pain had improved.  She is not complaining of any fevers chills, abdominal pain.  She has had some dysuria, has not noticed any hematuria  CT scan shows right-sided hydronephrosis, nonobstructing stone and pyelonephritis.  UA positive for leukocytes and occult blood, patient started on ceftriaxone 1 g.  Pain was treated with Toradol.  She is currently resting comfortably in bed.  Urology is aware and is requesting she be admitted to [place].   She has had a 1 day history of diarrhea note nausea as well.  She was hemodynamically stable, afebrile  Labs remarkable for hypercalcemia to 12, hyponatremia 130, creatinine is at baseline 1.1        Review of Systems   Constitutional:  Positive for fatigue. Negative for appetite change, chills and fever.   Respiratory:  Negative for shortness of breath.    Cardiovascular:  Negative for chest pain.   Gastrointestinal:  Positive for diarrhea and nausea. Negative for abdominal pain and constipation.        Liquid stools in ostomy bag   Genitourinary:  Positive for difficulty urinating and flank pain. Negative for hematuria.   Musculoskeletal:  Negative for back pain.   Neurological:  Negative for dizziness and light-headedness.   Psychiatric/Behavioral:  Negative for confusion, decreased concentration, dysphoric mood and hallucinations.      Historical Information   Past Medical History:    Diagnosis Date    Bleeding from colostomy stoma (HCC) 2/11/2023     states ileostomy , not colostomy    Cancer (HCC)     Colon cancer (HCC)     Elevated serum creatinine 09/11/2024    Fall     Headache     Hemochromatosis, unspecified     History of transfusion     2022 - no adverse reaction    Hypertension     Hypokalemia 09/12/2024    Kidney calculi     Kidney stone     Liver disease     hemangioma    Muscle weakness     Personal history of COVID-19 12/2022    Sarcoidosis     Seizures (HCC)     2022    Shingles      Past Surgical History:   Procedure Laterality Date    ABSCESS DRAINAGE      left breast    BREAST BIOPSY      CHEST TUBE INSERTION      COLON SURGERY      colostomy    COLONOSCOPY      FL GUIDED CENTRAL VENOUS ACCESS DEVICE INSERTION  03/21/2023    FL RETROGRADE PYELOGRAM  01/23/2023    FL RETROGRADE PYELOGRAM  04/03/2023    FL RETROGRADE PYELOGRAM  7/21/2023    FL RETROGRADE PYELOGRAM  10/11/2023    FL RETROGRADE PYELOGRAM  12/15/2023    FL RETROGRADE PYELOGRAM  2/8/2024    FL RETROGRADE PYELOGRAM  5/10/2024    FL RETROGRADE PYELOGRAM  9/26/2024    FL RETROGRADE PYELOGRAM  10/18/2024    IR BIOPSY LIVER MASS  05/09/2023    LUNG BIOPSY      NC CYSTO BLADDER W/URETERAL CATHETERIZATION Bilateral 07/21/2023    Procedure: CYSTOSCOPY URETEROSCOPY RETROGRADE PYELOGRAM WITH BILATERAL STENT URETERAL EXCHANGE; LEFT LASER LITHOTRIPSY AND STONE BASKET RETRIEVAL;  Surgeon: José Luis Stephens MD;  [facility];  Service: Urology    NC CYSTO BLADDER W/URETERAL CATHETERIZATION Bilateral 12/15/2023    Procedure: CYSTOSCOPY, BILATERAL  RETROGRADE PYELOGRAM , STENT EXCHANGE RIGHT , LEFT URETEROSCOPY ,  HOLMIUM LASER WITH INSERTION LEFT URETERAL STENT;  Surgeon: Derick Bhakta MD;  [facility];  Service: Urology    NC CYSTO BLADDER W/URETERAL CATHETERIZATION Left 2/8/2024    Procedure: CYSTOSCOPY B/L RETROGRADE PYELOGRAM WITH B/L T URETERALSTENT EXCHANGE,LEFT URETEROSCOPY, DILATION  LEFT URETERAL STICTURE;  Surgeon: José Luis Stephens MD;  [facility];  Service: Urology    WY CYSTO BLADDER W/URETERAL CATHETERIZATION Bilateral 5/10/2024    Procedure: CYSTOSCOPY RETROGRADE PYELOGRAM WITH INSERTION STENT URETERAL;  Surgeon: José Luis Stephens MD;  [facility];  Service: Urology    WY CYSTO W/INSERT URETERAL STENT Bilateral 5/10/2024    Procedure: EXCHANGE STENT URETERAL;  Surgeon: José Luis Stephens MD;  [facility];  Service: Urology    WY CYSTO W/INSERT URETERAL STENT Bilateral 10/18/2024    Procedure: EXCHANGE STENT URETERAL;  Surgeon: José Luis Stephens MD;  [facility];  Service: Urology    WY CYSTO/URETERO W/LITHOTRIPSY &INDWELL STENT INSRT Bilateral 01/23/2023    Procedure: CYSTOSCOPY  RIGHT URETEROSCOPY WITH LITHOTRIPSY HOLMIUM LASER, BILATERAL RETROGRADE PYELOGRAM AND BILATERAL  URETERAL STENT INSERTION;  Surgeon: José Luis Stephens MD;  [facility];  Service: Urology    WY CYSTO/URETERO W/LITHOTRIPSY &INDWELL STENT INSRT Right 04/03/2023    Procedure: RIGHT URETEROSCOPY WITH LITHOTRIPSY HOLMIUM LASER, RETROGRADE PYELOGRAM; BILATERAL EXCHANGE STENT URETERAL;  Surgeon: José Luis Stephens MD;  [facility];  Service: Urology    WY CYSTO/URETERO W/LITHOTRIPSY &INDWELL STENT INSRT Bilateral 10/11/2023    Procedure: CYSTOSCOPY URETEROSCOPY RETROGRADE PYELOGRAM AND INSERTION STENT URETERAL DIAGNOSTINC RIGHT URETEROSCOPY, REMOVAL STENT LEFT SIDE;  Surgeon: José Luis Stephens MD;  [facility];  Service: Urology    WY CYSTO/URETERO W/LITHOTRIPSY &INDWELL STENT INSRT Bilateral 9/26/2024    Procedure: CYSTOSCOPY ,LEFT  URETEROSCOPY,  BILATERAL  RETROGRADE PYELOGRAM AND BILATERAL URETERAL STENT EXCHANGE;  Surgeon: Favian Barber MD;  [facility];  Service: Urology    WY CYSTO/URETERO W/LITHOTRIPSY &INDWELL STENT INSRT Left 10/18/2024    Procedure: CYSTOSCOPY LEFT URETEROSCOPY, treatment of LEFT  ureteral stricture, RETROGRADE PYELOGRAM AND BILATERAL STENT EXCHANGE;  Surgeon: José Luis Stephens MD;  [facility];  Service: Urology    MT INSJ TUNNELED CTR VAD W/SUBQ PORT AGE 5 YR/> N/A 03/21/2023    Procedure: INSERTION VENOUS PORT ( PORT-A-CATH) IR;  Surgeon: Mark Amos DO;  [facility];  Service: Interventional Radiology    MT LAPS COLECTOMY PRTL W/COLOPXTSTMY LW ANAST N/A 8/17/2023    Procedure: RESECTION COLON LOW ANTERIOR LAPAROSCOPIC WITH ROBOTICS;  Surgeon: Sree Umanzor MD;  [facility];  Service: Colorectal    TONSILLECTOMY      URINARY SURGERY Bilateral     bilateral stents     Social History     Tobacco Use    Smoking status: Never     Passive exposure: Past    Smokeless tobacco: Never   Vaping Use    Vaping status: Never Used   Substance and Sexual Activity    Alcohol use: Never    Drug use: Never    Sexual activity: Not Currently     E-Cigarette/Vaping    E-Cigarette Use Never User      E-Cigarette/Vaping Substances    Nicotine No     THC No     CBD No     Flavoring No      Family History   Problem Relation Age of Onset    Cancer Mother     Cirrhosis Father     Breast cancer Neg Hx     Breast cancer additional onset Neg Hx      Social History:  Marital Status:    Occupation: not workuing  Patient Pre-hospital Living Situation: Home, With spouse, With other family member: children  Patient Pre-hospital Level of Mobility: walks  Patient Pre-hospital Diet Restrictions: none    Meds/Allergies   I have reviewed home medications with patient personally.  Prior to Admission medications    Medication Sig Start Date End Date Taking? Authorizing Provider   acetaminophen (TYLENOL) 325 mg tablet Take 2 tablets (650 mg total) by mouth every 4 (four) hours as needed for mild pain 10/18/24   José Luis Stephens MD   cyanocobalamin (VITAMIN B-12) 1000 MCG tablet Take 1,000 mcg by mouth daily    Historical Provider, MD   docusate sodium (COLACE) 100 mg capsule  Take 1 capsule (100 mg total) by mouth 2 (two) times a day for 15 days 10/18/24 11/11/24  José Luis Stephens MD   estrogens, conjugated (Premarin) vaginal cream Insert 0.3 g into the vagina 3 (three) times a week 5/10/24   José Luis Stephens MD   fenofibrate (TRICOR) 145 mg tablet Take 1 tablet (145 mg total) by mouth daily 2/2/24 2/1/25  Akua Machuca MD   ferrous sulfate 324 (65 Fe) mg TAKE 1 TABLET (324 MG TOTAL) BY MOUTH DAILY BEFORE BREAKFAST 11/5/24   Joselyn Reyes Bahamonde, MD   lisinopril (ZESTRIL) 5 mg tablet Take 1 tablet (5 mg total) by mouth daily 11/11/24   Joselyn Reyes Bahamonde, MD   naloxone (NARCAN) 4 mg/0.1 mL nasal spray Administer 1 spray into a nostril. If no response after 2-3 minutes, give another dose in the other nostril using a new spray. For use in emergencies for opioid / pain medication reversal for accidental ingestion, respiratory depression, sedation or concerns for overdose. 9/19/24 9/19/25  Jhon Banks DO   ondansetron (ZOFRAN) 4 mg tablet Take 1 tablet (4 mg total) by mouth every 8 (eight) hours as needed for nausea or vomiting 7/25/24   Koko Pillai MD   oxybutynin (DITROPAN) 5 mg tablet Take 1 tablet (5 mg total) by mouth every 6 (six) hours as needed (bladder spasms) 10/18/24   José Luis Stephens MD   oxybutynin (DITROPAN) 5 mg tablet TAKE 1 TABLET (5 MG TOTAL) BY MOUTH EVERY 6 (SIX) HOURS AS NEEDED (BLADDER SPASMS) 11/7/24   José Luis Stephens MD   oxyCODONE (Roxicodone) 5 immediate release tablet Take 1 tablet (5 mg total) by mouth every 4 (four) hours as needed for moderate pain Max Daily Amount: 30 mg 11/7/24   Jhon Banks DO   potassium chloride (MICRO-K) 10 MEQ CR capsule TAKE 4 CAPSULES (40 MEQ TOTAL) BY MOUTH 2 (TWO) TIMES A DAY 7/20/24 11/11/24  Leena Anaya MD   tamsulosin (FLOMAX) 0.4 mg Take 1 capsule (0.4 mg total) by mouth daily with dinner 8/2/24   José Luis Stephens MD   tamsulosin (FLOMAX) 0.4 mg Take 1 capsule (0.4 mg total) by mouth daily with  "dinner 10/18/24   José Luis Stephens MD   ursodiol (ACTIGALL) 300 mg capsule Take 3 capsules (900 mg total) by mouth in the morning 11/1/24 11/1/25  Akua Machuca MD     Allergies   Allergen Reactions    Oxaliplatin Shortness Of Breath     Reactions occurred with 2nd and 3rd infusions (about 1-3 hours from initiation of infusion) and required treatment with steroids and antihistamines. Please refer to allergy note on 2/7/2023 for detailed description of her reactions.    Morphine Nausea Only     \"Every dose made me nauseous\" (oral dosing only, can tolerate IV morphine)    Potassium Chloride Other (See Comments)     Pt reports \"burning\" with Iv potassium administration and wishes for it to be added to chart       Objective :  Temp:  [98.5 °F (36.9 °C)] 98.5 °F (36.9 °C)  HR:  [63-79] 63  BP: ()/(52-81) 96/52  Resp:  [21] 21  SpO2:  [97 %-100 %] 97 %  O2 Device: None (Room air)    Physical Exam  Vitals and nursing note reviewed.   Constitutional:       General: She is not in acute distress.     Appearance: She is well-developed.   HENT:      Head: Normocephalic and atraumatic.   Eyes:      Conjunctiva/sclera: Conjunctivae normal.   Cardiovascular:      Rate and Rhythm: Normal rate and regular rhythm.      Heart sounds: No murmur heard.  Pulmonary:      Effort: Pulmonary effort is normal. No respiratory distress.      Breath sounds: Normal breath sounds.   Abdominal:      Palpations: Abdomen is soft.      Tenderness: There is no abdominal tenderness. There is right CVA tenderness.      Comments: Ostomy bag appears intact.  No erythema or rashes around the area   Musculoskeletal:         General: No swelling.      Cervical back: Neck supple.   Skin:     General: Skin is warm and dry.      Capillary Refill: Capillary refill takes less than 2 seconds.   Neurological:      Mental Status: She is alert.   Psychiatric:         Mood and Affect: Mood normal.         Lab Results: I have reviewed the following " results:  Results from last 7 days   Lab Units 11/14/24  2242   WBC Thousand/uL 6.41   HEMOGLOBIN g/dL 11.1*   HEMATOCRIT % 33.4*   PLATELETS Thousands/uL 259   SEGS PCT % 80*   LYMPHO PCT % 6*   MONO PCT % 7   EOS PCT % 7*     Results from last 7 days   Lab Units 11/14/24  2242   SODIUM mmol/L 130*   POTASSIUM mmol/L 4.2   CHLORIDE mmol/L 100   CO2 mmol/L 22   BUN mg/dL 22   CREATININE mg/dL 1.11   ANION GAP mmol/L 8   CALCIUM mg/dL 12.0*   ALBUMIN g/dL 4.2   TOTAL BILIRUBIN mg/dL 0.62   ALK PHOS U/L 116*   ALT U/L 19   AST U/L 27   GLUCOSE RANDOM mg/dL 121             Lab Results   Component Value Date    HGBA1C 5.2 09/10/2024    HGBA1C 6.3 (H) 06/30/2023    HGBA1C 6.0 (H) 03/13/2023                 Administrative Statements       ** Please Note: This note has been constructed using a voice recognition system. **

## 2024-11-15 NOTE — ASSESSMENT & PLAN NOTE
Results from last 7 days   Lab Units 11/15/24  0111   LEUKOCYTES UA  Large*   NITRITE UA  Negative   GLUCOSE UA mg/dl Negative   KETONES UA mg/dl Negative   BLOOD UA  Large*   WBC UA /hpf Innumerable*   RBC UA /hpf Innumerable*   BACTERIA UA /hpf Occasional   Patient presenting with right flank pain, dysuria, CT findings suspicious for right-sided pyelonephritis    Plan:  Continue 1 g IV ceftriaxone daily  Follow-up urine cultures and sensitivities  Normal saline 125 mL/hr

## 2024-11-15 NOTE — ASSESSMENT & PLAN NOTE
Per urology office note: Bilateral ureteral stricture disease, Maintained with bilateral ureteral stents, last exchanged May 10, 2024  - RIGHT proximal ureteral stricture is recalcitrant to endoscopic management, long and heavily calcified- LEFT distal ureteral stricture    CT AP mild left and moderate right hydronephrosis. Nonobstructing calculi in the lower pole of the right kidney. Bilateral ureteral stents in place.    Plan:  Continue PTA Flomax 0.4 mg nightly  Continue PTA oxybutynin every 6 hours as needed for bladder spasms  Follow-up urology consultation  N.p.o. for potential urology intervention

## 2024-11-15 NOTE — PLAN OF CARE
Problem: PAIN - ADULT  Goal: Verbalizes/displays adequate comfort level or baseline comfort level  Description: Interventions:  - Encourage patient to monitor pain and request assistance  - Assess pain using appropriate pain scale  - Administer analgesics based on type and severity of pain and evaluate response  - Implement non-pharmacological measures as appropriate and evaluate response  - Consider cultural and social influences on pain and pain management  - Notify physician/advanced practitioner if interventions unsuccessful or patient reports new pain  Outcome: Progressing     Problem: INFECTION - ADULT  Goal: Absence or prevention of progression during hospitalization  Description: INTERVENTIONS:  - Assess and monitor for signs and symptoms of infection  - Monitor lab/diagnostic results  - Monitor all insertion sites, i.e. indwelling lines, tubes, and drains  - Monitor endotracheal if appropriate and nasal secretions for changes in amount and color  - [place] appropriate cooling/warming therapies per order  - Administer medications as ordered  - Instruct and encourage patient and family to use good hand hygiene technique  - Identify and instruct in appropriate isolation precautions for identified infection/condition  Outcome: Progressing     Problem: DISCHARGE PLANNING  Goal: Discharge to home or other facility with appropriate resources  Description: INTERVENTIONS:  - Identify barriers to discharge w/patient and caregiver  - Arrange for needed discharge resources and transportation as appropriate  - Identify discharge learning needs (meds, wound care, etc.)  - Arrange for interpretive services to assist at discharge as needed  - Refer to Case Management Department for coordinating discharge planning if the patient needs post-hospital services based on physician/advanced practitioner order or complex needs related to functional status, cognitive ability, or social support system  Outcome: Progressing    Problem: SAFETY ADULT  Goal: Patient will remain free of falls  Description: INTERVENTIONS:  - Educate patient/family on patient safety including physical limitations  - Instruct patient to call for assistance with activity   - Consult OT/PT to assist with strengthening/mobility   - Keep Call bell within reach  - Keep bed low and locked with side rails adjusted as appropriate  - Keep care items and personal belongings within reach  - Initiate and maintain comfort rounds  - Make Fall Risk Sign visible to staff  - Offer Toileting every 2 Hours, in advance of need  -- Apply yellow socks and bracelet for high fall risk patients  - Consider moving patient to room near nurses station  Outcome: Progressing

## 2024-11-15 NOTE — ASSESSMENT & PLAN NOTE
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 Follows with heme-onc outpatient, per recent visit  she does not have clinical, laboratory, or imaging evidence of recurrent or metastatic disease

## 2024-11-15 NOTE — ASSESSMENT & PLAN NOTE
CTAP: Right ventral abdominal ileostomy. Dilated distal ileal loops prior to the ileostomy site containing heterogeneous density debris. Findings may indicate focal ileus or dysmotility. No evidence for bowel obstruction, mesenteric inflammation,   appendicitis, free air, or free fluid.  Patient c/o some increased loose stools in her colostomy bag. Continue to monitor output and quality of stools

## 2024-11-15 NOTE — ASSESSMENT & PLAN NOTE
58 y.o. female with pmh of biopsy proven sarcoidosis with pulmonary and hepatic involvement. She denies any respiratory, joint or skin complaints at this time.     CTAP: Multiple pulmonary nodules in the imaged lung bases measuring up to 8 mm are again seen, slightly increased in size and number compared to the prior exam.

## 2024-11-15 NOTE — ASSESSMENT & PLAN NOTE
Per urology office note: Bilateral ureteral stricture disease, Maintained with bilateral ureteral stents, last exchanged May 10, 2024  - RIGHT proximal ureteral stricture is recalcitrant to endoscopic management, long and heavily calcified- LEFT distal ureteral stricture    CT AP mild left and moderate right hydronephrosis. Nonobstructing calculi in the lower pole of the right kidney. Bilateral ureteral stents in place.    Urology was consulted who did not recommend any stent exchanges.  If she continues to have symptoms they recommended IR guided right sided percutaneous nephrostomy.    Plan:  Stable without any further need for intervention at this time.  Creatinine improved to baseline

## 2024-11-16 VITALS
SYSTOLIC BLOOD PRESSURE: 138 MMHG | WEIGHT: 118.61 LBS | OXYGEN SATURATION: 97 % | HEIGHT: 62 IN | RESPIRATION RATE: 16 BRPM | BODY MASS INDEX: 21.83 KG/M2 | HEART RATE: 76 BPM | TEMPERATURE: 98.3 F | DIASTOLIC BLOOD PRESSURE: 66 MMHG

## 2024-11-16 PROBLEM — N13.30 BILATERAL HYDRONEPHROSIS: Chronic | Status: ACTIVE | Noted: 2024-09-24

## 2024-11-16 PROBLEM — N30.01 ACUTE CYSTITIS WITH HEMATURIA: Status: ACTIVE | Noted: 2024-11-15

## 2024-11-16 LAB
ANION GAP SERPL CALCULATED.3IONS-SCNC: 6 MMOL/L (ref 4–13)
ATRIAL RATE: 66 BPM
BACTERIA UR CULT: NORMAL
BUN SERPL-MCNC: 19 MG/DL (ref 5–25)
CALCIUM SERPL-MCNC: 10 MG/DL (ref 8.4–10.2)
CHLORIDE SERPL-SCNC: 108 MMOL/L (ref 96–108)
CO2 SERPL-SCNC: 21 MMOL/L (ref 21–32)
CREAT SERPL-MCNC: 1.12 MG/DL (ref 0.6–1.3)
ERYTHROCYTE [DISTWIDTH] IN BLOOD BY AUTOMATED COUNT: 14.4 % (ref 11.6–15.1)
GFR SERPL CREATININE-BSD FRML MDRD: 55 ML/MIN/1.73SQ M
GLUCOSE SERPL-MCNC: 88 MG/DL (ref 65–140)
HCT VFR BLD AUTO: 24.1 % (ref 34.8–46.1)
HGB BLD-MCNC: 8 G/DL (ref 11.5–15.4)
MCH RBC QN AUTO: 30 PG (ref 26.8–34.3)
MCHC RBC AUTO-ENTMCNC: 33.2 G/DL (ref 31.4–37.4)
MCV RBC AUTO: 90 FL (ref 82–98)
P AXIS: 61 DEGREES
PLATELET # BLD AUTO: 162 THOUSANDS/UL (ref 149–390)
PMV BLD AUTO: 9 FL (ref 8.9–12.7)
POTASSIUM SERPL-SCNC: 3.6 MMOL/L (ref 3.5–5.3)
PR INTERVAL: 194 MS
QRS AXIS: 57 DEGREES
QRSD INTERVAL: 78 MS
QT INTERVAL: 376 MS
QTC INTERVAL: 394 MS
RBC # BLD AUTO: 2.67 MILLION/UL (ref 3.81–5.12)
SODIUM SERPL-SCNC: 135 MMOL/L (ref 135–147)
T WAVE AXIS: 29 DEGREES
VENTRICULAR RATE: 66 BPM
WBC # BLD AUTO: 3.36 THOUSAND/UL (ref 4.31–10.16)

## 2024-11-16 PROCEDURE — 80048 BASIC METABOLIC PNL TOTAL CA: CPT | Performed by: NURSE PRACTITIONER

## 2024-11-16 PROCEDURE — 85027 COMPLETE CBC AUTOMATED: CPT | Performed by: NURSE PRACTITIONER

## 2024-11-16 PROCEDURE — 99239 HOSP IP/OBS DSCHRG MGMT >30: CPT | Performed by: INTERNAL MEDICINE

## 2024-11-16 PROCEDURE — 93010 ELECTROCARDIOGRAM REPORT: CPT | Performed by: INTERNAL MEDICINE

## 2024-11-16 RX ORDER — CEPHALEXIN 500 MG/1
500 CAPSULE ORAL EVERY 6 HOURS SCHEDULED
Qty: 12 CAPSULE | Refills: 0 | Status: SHIPPED | OUTPATIENT
Start: 2024-11-16 | End: 2024-11-19

## 2024-11-16 RX ADMIN — URSODIOL 900 MG: 300 CAPSULE ORAL at 08:28

## 2024-11-16 RX ADMIN — CEFTRIAXONE 1000 MG: 2 INJECTION, POWDER, FOR SOLUTION INTRAMUSCULAR; INTRAVENOUS at 01:00

## 2024-11-16 RX ADMIN — FENOFIBRATE 145 MG: 145 TABLET ORAL at 08:27

## 2024-11-16 RX ADMIN — POTASSIUM CHLORIDE 20 MEQ: 1500 TABLET, EXTENDED RELEASE ORAL at 08:27

## 2024-11-16 RX ADMIN — CYANOCOBALAMIN TAB 500 MCG 1000 MCG: 500 TAB at 08:25

## 2024-11-16 RX ADMIN — ENOXAPARIN SODIUM 40 MG: 40 INJECTION SUBCUTANEOUS at 08:31

## 2024-11-16 RX ADMIN — LISINOPRIL 5 MG: 10 TABLET ORAL at 08:26

## 2024-11-16 RX ADMIN — SODIUM CHLORIDE 125 ML/HR: 0.9 INJECTION, SOLUTION INTRAVENOUS at 06:15

## 2024-11-16 RX ADMIN — OXYBUTYNIN CHLORIDE 5 MG: 5 TABLET ORAL at 06:31

## 2024-11-16 NOTE — PLAN OF CARE

## 2024-11-16 NOTE — UTILIZATION REVIEW
Initial Clinical Review    Admission: Date/Time/Statement:   Admission Orders (From admission, onward)       Ordered        11/15/24 0459  INPATIENT ADMISSION  Once                          Orders Placed This Encounter   Procedures    INPATIENT ADMISSION     Standing Status:   Standing     Number of Occurrences:   1     Level of Care:   Med Surg [16]     Estimated length of stay:   More than 2 Midnights     Certification:   I certify that inpatient services are medically necessary for this patient for a duration of greater than two midnights. See H&P and MD Progress Notes for additional information about the patient's course of treatment.     ED Arrival Information       Expected   -    Arrival   11/14/2024 21:58    Acuity   Urgent              Means of arrival   Walk-In    Escorted by   Family Member    Service   Hospitalist    Admission type   Emergency              Arrival complaint   Flank Pain             Chief Complaint   Patient presents with    Flank Pain     Hx of stents and kidney stones. C/o R sided flank pain that started several hours ago. Denies fevers or urinary symptoms. Reports taking 5mg of Morphine at 9pm w/ no relief.        PMH   sarcoidosis,  rectal cancer s/p colostomy, recurrent nephrolithiasis, multiple ureteroscopic interventions.  She has bilateral ureteral stricture disease maintained with bilateral ureteral stents.  She has a right proximal ureteral stricture recalcitrant to multiple endoscopic management and would require reconstructive procedure on the right side.  On the left side she underwent cystoscopy, left ureteroscopy with treatment of ureteral stricture, balloon dilatation with Dr. Stephens on 10/18/2024.  After procedure the plan was offering a trial without ureteral stent on the left side for 6 weeks.  Patient is pending reversal of her ileostomy, would leave stents in place until after her procedure.  Plan is to refer her to reconstructive urology for her right proximal  "ureteral stricture after she has her ileostomy reversal.    Initial Presentation: 58 y.o. female  to ER  11/14  from home  w/c/o acute onset Rt flank pain w/\"some\" dysuria.  IN ER CT revealed mild left and worsening  right hydronephrosis   EXAM:  Rt CVA Tenderness  (no left CVA tenderness)   abd  non distended.  No peripheral edema.   Breath sounds clear.     11/15  @  0459    ADMIT INPT Status,  MS  Level of care  for  treatment of  acute pyelonephritis,  b/l  ureteral strictures - b/l ureteral stents -  b/l hydronephrosis.    Will Cont IVF @  125 ,  IVAB,   IV pain/nausea control.  Reg diet.   Urology consult   I/O q shift.  Flomax po. Monitor/Replete electrolytes  (cont home daily K+)     11/15    UROLOGY:   Cont conservative management w/ IV hydration, abx to cover bacteria based on prior cultures.Trend crt , if this continues to rise we will consider stent exchange versus nephrostomy tube insertion. For now I would plan to maintain her ureteral stents in place as takedown of her ileostomy planned in the near future.     Date: 11/16   Day 2:  Patient feels good today. Not having any pain at all     Patient presenting with right flank pain, dysuria, CT findings suspicious for right-sided pyelonephritis clinically not consistent with pyelonephritis.  Will treat with short course of antibiotics for UTI.  Transition to p.o. Keflex for 3 more days   no intervention at this time      ED Treatment-Medication Administration from 11/14/2024 2158 to 11/15/2024 1336         Date/Time Order Dose Route Action     11/14/2024 2252 ketorolac (TORADOL) injection 15 mg 15 mg Intravenous Given     11/14/2024 2238 ondansetron (ZOFRAN) injection 4 mg 4 mg Intravenous Given     11/15/2024 0111 sodium chloride 0.9 % bolus 1,000 mL 1,000 mL Intravenous New Bag     11/15/2024 0344 ceftriaxone (ROCEPHIN) 1 g/50 mL in dextrose IVPB 1,000 mg Intravenous New Bag     11/15/2024 0418 HYDROmorphone (DILAUDID) injection 0.5 mg 0.5 mg " Intravenous Given     11/15/2024 0850 fenofibrate (TRICOR) tablet 145 mg 145 mg Oral Given     11/15/2024 0850 lisinopril (ZESTRIL) tablet 5 mg 5 mg Oral Given     11/15/2024 0849 ursodiol (ACTIGALL) capsule 900 mg 900 mg Oral Given     11/15/2024 0845 acetaminophen (TYLENOL) tablet 650 mg 650 mg Oral Given     11/15/2024 0850 cyanocobalamin (VITAMIN B-12) tablet 1,000 mcg 1,000 mcg Oral Given     11/15/2024 0633 sodium chloride 0.9 % infusion 125 mL/hr Intravenous New Bag     11/15/2024 0851 potassium chloride (Klor-Con M20) CR tablet 20 mEq 20 mEq Oral Given            Scheduled Medications:  cefTRIAXone, 1,000 mg, Intravenous, Q24H  cyanocobalamin, 1,000 mcg, Oral, Daily  docusate sodium, 100 mg, Oral, BID  enoxaparin, 40 mg, Subcutaneous, Daily  fenofibrate, 145 mg, Oral, Daily  lisinopril, 5 mg, Oral, Daily  potassium chloride, 20 mEq, Oral, Daily  tamsulosin, 0.4 mg, Oral, Daily With Dinner  ursodiol, 900 mg, Oral, Daily      Continuous IV Infusions:  sodium chloride, 125 mL/hr, Intravenous, Continuous      PRN Meds:  acetaminophen, 650 mg, Oral, Q4H PRN  HYDROmorphone, 0.5 mg, Intravenous, Q2H PRN  naloxone, 0.04 mg, Intravenous, Q1MIN PRN  ondansetron, 4 mg, Intravenous, Q6H PRN  oxybutynin, 5 mg, Oral, Q6H PRN  oxyCODONE, 5 mg, Oral, Q4H PRN   Or  oxyCODONE, 10 mg, Oral, Q4H PRN      ED Triage Vitals [11/14/24 2205]   Temperature Pulse Respirations Blood Pressure SpO2 Pain Score   98.5 °F (36.9 °C) 76 21 132/74 97 % 5     Weight (last 2 days)       Date/Time Weight    11/15/24 1346 53.8 (118.61)            Vital Signs (last 3 days)       Date/Time Temp Pulse Resp BP MAP (mmHg) SpO2 [place] Coma Scale Score Pain    11/16/24 07:25:57 98.3 °F (36.8 °C) 76 16 138/66 90 97 % -- --    11/15/24 23:28:02 99 °F (37.2 °C) 90 16 119/52 74 98 % -- --    11/15/24 2100 -- -- -- -- -- -- 15 No Pain    11/15/24 16:04:34 -- -- 16 113/50 71 -- -- --    11/15/24 15:59:22 99.5 °F (37.5 °C) 77 16 100/53 69 99 % -- --     11/15/24 1350 -- -- -- -- -- -- 15 No Pain    11/15/24 13:47:40 98.9 °F (37.2 °C) 78 17 119/79 92 98 % -- --    11/15/24 0850 -- -- -- 105/56 -- -- -- --    11/15/24 0845 -- -- -- -- -- -- -- 2    11/15/24 0645 -- 65 -- 101/58 75 99 % -- --    11/15/24 0643 -- -- -- -- -- -- 15 --    11/15/24 0634 -- 63 18 101/58 -- 98 % -- No Pain    11/15/24 0530 -- 63 -- 96/52 71 97 % -- --    11/15/24 0418 -- -- -- -- -- -- -- 3    11/15/24 0330 -- 64 -- 124/69 90 99 % -- --    11/15/24 0300 -- 77 -- 130/66 90 98 % -- --    11/15/24 0237 -- -- -- -- -- -- -- 3    11/15/24 0030 -- 64 -- 127/74 94 98 % -- --    11/14/24 2252 -- -- -- -- -- -- -- 5    11/14/24 2245 -- 79 -- 170/81 116 100 % -- --    11/14/24 2205 98.5 °F (36.9 °C) 76 21 132/74 100 97 % -- 5              Pertinent Labs/Diagnostic Test Results:   Radiology:  CT abdomen pelvis with contrast   Final Interpretation by Brennen Reyna MD (11/15 0308)      1.  Multiple pulmonary nodules in the imaged lung bases measuring up to 8 mm are again seen, slightly increased in size and number compared to the prior exam.   2.  Splenomegaly without focal lesions.   3.  Mild left and moderate right hydronephrosis. Nonobstructing calculi in the lower pole of the right kidney. Bilateral ureteral stents noted in place with distal pigtail loops within the posterior inferior bladder and proximal pigtail loops in the left    upper pole collecting system and right renal pelvic regions.  Asymmetrical right perinephric hazy inflammatory stranding noted. Recommend correlation with urinalysis to exclude right  tract infection.   4.  Right ventral abdominal ileostomy. Dilated distal ileal loops prior to the ileostomy site containing heterogeneous density debris. Findings may indicate focal ileus or dysmotility. No evidence for bowel obstruction, mesenteric inflammation,    appendicitis, free air, or free fluid.         Workstation performed: [id number]           Cardiology:  No orders to  display     GI:  No orders to display           Results from last 7 days   Lab Units 11/16/24  0834 11/15/24  1327 11/14/24  2242   WBC Thousand/uL 3.36* 5.66 6.41   HEMOGLOBIN g/dL 8.0* 10.3* 11.1*   HEMATOCRIT % 24.1* 32.0* 33.4*   PLATELETS Thousands/uL 162 215 259   TOTAL NEUT ABS Thousands/µL  --  4.45 5.14         Results from last 7 days   Lab Units 11/15/24  1327 11/14/24  2242   SODIUM mmol/L 132* 130*   POTASSIUM mmol/L 4.6 4.2   CHLORIDE mmol/L 104 100   CO2 mmol/L 21 22   ANION GAP mmol/L 7 8   BUN mg/dL 20 22   CREATININE mg/dL 1.36* 1.11   EGFR ml/min/1.73sq m 43 55   CALCIUM mg/dL 10.7* 12.0*   MAGNESIUM mg/dL  --  1.7*     Results from last 7 days   Lab Units 11/14/24  2242   AST U/L 27   ALT U/L 19   ALK PHOS U/L 116*   TOTAL PROTEIN g/dL 7.7   ALBUMIN g/dL 4.2   TOTAL BILIRUBIN mg/dL 0.62         Results from last 7 days   Lab Units 11/15/24  1327 11/14/24  2242   GLUCOSE RANDOM mg/dL 57* 121     Results from last 7 days   Lab Units 11/14/24  2242   LIPASE u/L 80     Results from last 7 days   Lab Units 11/15/24  0111   CLARITY UA  Turbid   COLOR UA  Light Yellow   SPEC GRAV UA  1.010   PH UA  5.5   GLUCOSE UA mg/dl Negative   KETONES UA mg/dl Negative   BLOOD UA  Large*   PROTEIN UA mg/dl Trace*   NITRITE UA  Negative   BILIRUBIN UA  Negative   UROBILINOGEN UA (BE) mg/dl <2.0   LEUKOCYTES UA  Large*   WBC UA /hpf Innumerable*   RBC UA /hpf Innumerable*   BACTERIA UA /hpf Occasional   EPITHELIAL CELLS WET PREP /hpf Occasional   MUCUS THREADS  Occasional*     Results from last 7 days   Lab Units 11/15/24  0111   URINE CULTURE  50,000-59,000 cfu/ml     Past Medical History:   Diagnosis Date    Bleeding from colostomy stoma (HCC) 2/11/2023     states ileostomy , not colostomy    Cancer (HCC)     Colon cancer (HCC)     Elevated serum creatinine 09/11/2024    Fall     Headache     Hemochromatosis, unspecified     History of transfusion     2022 - no adverse reaction    Hypertension      Hypokalemia 09/12/2024    Kidney calculi     Kidney stone     Liver disease     hemangioma    Muscle weakness     Personal history of COVID-19 12/2022    Sarcoidosis     Seizures (HCC)     2022    Shingles      Present on Admission:   Malignant neoplasm of sigmoid colon (HCC)   Sarcoidosis   Bilateral hydronephrosis   Hypercalcemia   Nephrolithiasis      Admitting Diagnosis: Nausea [R11.0]  Flank pain [R10.9]  Hydronephrosis, right [N13.30]  Hydronephrosis due to ureteral stricture [N13.1]  Pyelonephritis of right kidney [N12]  Age/Sex: 58 y.o. female    Network Utilization Review Department  ATTENTION: Please call with any questions or concerns to [phone] and carefully listen to the prompts so that you are directed to the right person. All voicemails are confidential.   For Discharge needs, contact Care Management DC Support Team at [phone] opt. 2  Send all requests for admission clinical reviews, approved or denied determinations and any other requests to dedicated fax number below belonging to the campus where the patient is receiving treatment. List of dedicated fax numbers for the Facilities:  FACILITY NAME UR FAX NUMBER   ADMISSION DENIALS (Administrative/Medical Necessity) [phone]   DISCHARGE SUPPORT TEAM (NETWORK) [phone]   PARENT CHILD HEALTH (Maternity/NICU/Pediatrics) [phone]   [facility] [phone]   [facility] [phone]   [facility] [phone]   [facility] [phone]   [facility] [phone]   [facility] [phone]   [facility] [phone]   [facility] [phone]   [facility] [phone]   [facility] [phone]   [facility] [phone]   [facility]  [facility] [phone]

## 2024-11-16 NOTE — ASSESSMENT & PLAN NOTE
HB 8.0 , admission hb   No signs of bleeding   Hx of NOLVIA. She undergoes regular colonoscopies due to hx of colon cancer.   Continue to monitor for any signs of bleeding

## 2024-11-16 NOTE — PLAN OF CARE

## 2024-11-18 ENCOUNTER — TRANSITIONAL CARE MANAGEMENT (OUTPATIENT)
Dept: INTERNAL MEDICINE CLINIC | Facility: CLINIC | Age: 59
End: 2024-11-18

## 2024-11-18 ENCOUNTER — TELEPHONE (OUTPATIENT)
Age: 59
End: 2024-11-18

## 2024-11-18 NOTE — UTILIZATION REVIEW
Initial Clinical Review    Admission: Date/Time/Statement:   Admission Orders (From admission, onward)       Ordered        11/15/24 0459  INPATIENT ADMISSION  Once                          Orders Placed This Encounter   Procedures    INPATIENT ADMISSION     Standing Status:   Standing     Number of Occurrences:   1     Level of Care:   Med Surg [16]     Estimated length of stay:   More than 2 Midnights     Certification:   I certify that inpatient services are medically necessary for this patient for a duration of greater than two midnights. See H&P and MD Progress Notes for additional information about the patient's course of treatment.     ED Arrival Information       Expected   -    Arrival   11/14/2024 21:58    Acuity   Urgent              Means of arrival   Walk-In    Escorted by   Family Member    Service   Hospitalist    Admission type   Emergency              Arrival complaint   Flank Pain             Chief Complaint   Patient presents with    Flank Pain     Hx of stents and kidney stones. C/o R sided flank pain that started several hours ago. Denies fevers or urinary symptoms. Reports taking 5mg of Morphine at 9pm w/ no relief.        PMH   sarcoidosis,  rectal cancer s/p colostomy, recurrent nephrolithiasis, multiple ureteroscopic interventions.  She has bilateral ureteral stricture disease maintained with bilateral ureteral stents.  She has a right proximal ureteral stricture recalcitrant to multiple endoscopic management and would require reconstructive procedure on the right side.  On the left side she underwent cystoscopy, left ureteroscopy with treatment of ureteral stricture, balloon dilatation with Dr. Stephens on 10/18/2024.  After procedure the plan was offering a trial without ureteral stent on the left side for 6 weeks.  Patient is pending reversal of her ileostomy, would leave stents in place until after her procedure.  Plan is to refer her to reconstructive urology for her right proximal  "ureteral stricture after she has her ileostomy reversal.    Initial Presentation: 59 y.o. female  to ER  11/14  from home  w/c/o acute onset Rt flank pain w/\"some\" dysuria.  IN ER CT revealed mild left and worsening  right hydronephrosis   EXAM:  Rt CVA Tenderness  (no left CVA tenderness)   abd  non distended.  No peripheral edema.   Breath sounds clear.     11/15  @  0459    ADMIT INPT Status,  MS  Level of care  for  treatment of  acute pyelonephritis,  b/l  ureteral strictures - b/l ureteral stents -  b/l hydronephrosis.    Will Cont IVF @  125 ,  IVAB,   IV pain/nausea control.  Reg diet.   Urology consult   I/O q shift.  Flomax po. Monitor/Replete electrolytes  (cont home daily K+)     11/15    UROLOGY:   Cont conservative management w/ IV hydration, abx to cover bacteria based on prior cultures.Trend crt , if this continues to rise we will consider stent exchange versus nephrostomy tube insertion. For now I would plan to maintain her ureteral stents in place as takedown of her ileostomy planned in the near future.     Date: 11/16   Day 2:  Patient feels good today. Not having any pain at all     Patient presenting with right flank pain, dysuria, CT findings suspicious for right-sided pyelonephritis clinically not consistent with pyelonephritis.  Will treat with short course of antibiotics for UTI.  Transition to p.o. Keflex for 3 more days   no intervention at this time      ED Treatment-Medication Administration from 11/14/2024 2158 to 11/15/2024 1336         Date/Time Order Dose Route Action     11/14/2024 2252 ketorolac (TORADOL) injection 15 mg 15 mg Intravenous Given     11/14/2024 2238 ondansetron (ZOFRAN) injection 4 mg 4 mg Intravenous Given     11/15/2024 0111 sodium chloride 0.9 % bolus 1,000 mL 1,000 mL Intravenous New Bag     11/15/2024 0344 ceftriaxone (ROCEPHIN) 1 g/50 mL in dextrose IVPB 1,000 mg Intravenous New Bag     11/15/2024 0418 HYDROmorphone (DILAUDID) injection 0.5 mg 0.5 mg " Intravenous Given     11/15/2024 0850 fenofibrate (TRICOR) tablet 145 mg 145 mg Oral Given     11/15/2024 0850 lisinopril (ZESTRIL) tablet 5 mg 5 mg Oral Given     11/15/2024 0849 ursodiol (ACTIGALL) capsule 900 mg 900 mg Oral Given     11/15/2024 0845 acetaminophen (TYLENOL) tablet 650 mg 650 mg Oral Given     11/15/2024 0850 cyanocobalamin (VITAMIN B-12) tablet 1,000 mcg 1,000 mcg Oral Given     11/15/2024 0633 sodium chloride 0.9 % infusion 125 mL/hr Intravenous New Bag     11/15/2024 0851 potassium chloride (Klor-Con M20) CR tablet 20 mEq 20 mEq Oral Given            Scheduled Medications:  No current facility-administered medications for this encounter.    Continuous IV Infusions:  No current facility-administered medications for this encounter.    PRN Meds:  No current facility-administered medications for this encounter.    ED Triage Vitals [11/14/24 2205]   Temperature Pulse Respirations Blood Pressure SpO2 Pain Score   98.5 °F (36.9 °C) 76 21 132/74 97 % 5     Weight (last 2 days) before discharge       Date/Time Weight    11/15/24 1346 53.8 (118.61)            Vital Signs (last 3 days)       Date/Time Temp Pulse Resp BP MAP (mmHg) SpO2 [place] Coma Scale Score Pain    11/16/24 07:25:57 98.3 °F (36.8 °C) 76 16 138/66 90 97 % -- --    11/15/24 23:28:02 99 °F (37.2 °C) 90 16 119/52 74 98 % -- --    11/15/24 2100 -- -- -- -- -- -- 15 No Pain    11/15/24 16:04:34 -- -- 16 113/50 71 -- -- --    11/15/24 15:59:22 99.5 °F (37.5 °C) 77 16 100/53 69 99 % -- --    11/15/24 1350 -- -- -- -- -- -- 15 No Pain    11/15/24 13:47:40 98.9 °F (37.2 °C) 78 17 119/79 92 98 % -- --    11/15/24 0850 -- -- -- 105/56 -- -- -- --    11/15/24 0845 -- -- -- -- -- -- -- 2    11/15/24 0645 -- 65 -- 101/58 75 99 % -- --    11/15/24 0643 -- -- -- -- -- -- 15 --    11/15/24 0634 -- 63 18 101/58 -- 98 % -- No Pain    11/15/24 0530 -- 63 -- 96/52 71 97 % -- --    11/15/24 0418 -- -- -- -- -- -- -- 3    11/15/24 0330 -- 64 -- 124/69 90 99 %  -- --    11/15/24 0300 -- 77 -- 130/66 90 98 % -- --    11/15/24 0237 -- -- -- -- -- -- -- 3    11/15/24 0030 -- 64 -- 127/74 94 98 % -- --    11/14/24 2252 -- -- -- -- -- -- -- 5    11/14/24 2245 -- 79 -- 170/81 116 100 % -- --    11/14/24 2205 98.5 °F (36.9 °C) 76 21 132/74 100 97 % -- 5              Pertinent Labs/Diagnostic Test Results:   Radiology:  CT abdomen pelvis with contrast   Final Interpretation by Brennen Reyna MD (11/15 0308)      1.  Multiple pulmonary nodules in the imaged lung bases measuring up to 8 mm are again seen, slightly increased in size and number compared to the prior exam.   2.  Splenomegaly without focal lesions.   3.  Mild left and moderate right hydronephrosis. Nonobstructing calculi in the lower pole of the right kidney. Bilateral ureteral stents noted in place with distal pigtail loops within the posterior inferior bladder and proximal pigtail loops in the left    upper pole collecting system and right renal pelvic regions.  Asymmetrical right perinephric hazy inflammatory stranding noted. Recommend correlation with urinalysis to exclude right  tract infection.   4.  Right ventral abdominal ileostomy. Dilated distal ileal loops prior to the ileostomy site containing heterogeneous density debris. Findings may indicate focal ileus or dysmotility. No evidence for bowel obstruction, mesenteric inflammation,    appendicitis, free air, or free fluid.         Workstation performed: [id number]           Cardiology:  ECG 12 lead   Final Result by Nadine Alvarenga DO (11/16 1739)   Normal sinus rhythm   Normal ECG   No previous ECGs available   Confirmed by Nadine Alvarenga ([id number]) on 11/16/2024 5:39:21 PM        GI:  No orders to display           Results from last 7 days   Lab Units 11/16/24  0834 11/15/24  1327 11/14/24  2242   WBC Thousand/uL 3.36* 5.66 6.41   HEMOGLOBIN g/dL 8.0* 10.3* 11.1*   HEMATOCRIT % 24.1* 32.0* 33.4*   PLATELETS Thousands/uL 162 215 259   TOTAL NEUT ABS Thousands/µL  --   4.45 5.14         Results from last 7 days   Lab Units 11/16/24  1125 11/15/24  1327 11/14/24  2242   SODIUM mmol/L 135 132* 130*   POTASSIUM mmol/L 3.6 4.6 4.2   CHLORIDE mmol/L 108 104 100   CO2 mmol/L 21 21 22   ANION GAP mmol/L 6 7 8   BUN mg/dL 19 20 22   CREATININE mg/dL 1.12 1.36* 1.11   EGFR ml/min/1.73sq m 55 43 55   CALCIUM mg/dL 10.0 10.7* 12.0*   MAGNESIUM mg/dL  --   --  1.7*     Results from last 7 days   Lab Units 11/14/24  2242   AST U/L 27   ALT U/L 19   ALK PHOS U/L 116*   TOTAL PROTEIN g/dL 7.7   ALBUMIN g/dL 4.2   TOTAL BILIRUBIN mg/dL 0.62         Results from last 7 days   Lab Units 11/16/24  1125 11/15/24  1327 11/14/24  2242   GLUCOSE RANDOM mg/dL 88 57* 121     Results from last 7 days   Lab Units 11/14/24  2242   LIPASE u/L 80     Results from last 7 days   Lab Units 11/15/24  0111   CLARITY UA  Turbid   COLOR UA  Light Yellow   SPEC GRAV UA  1.010   PH UA  5.5   GLUCOSE UA mg/dl Negative   KETONES UA mg/dl Negative   BLOOD UA  Large*   PROTEIN UA mg/dl Trace*   NITRITE UA  Negative   BILIRUBIN UA  Negative   UROBILINOGEN UA (BE) mg/dl <2.0   LEUKOCYTES UA  Large*   WBC UA /hpf Innumerable*   RBC UA /hpf Innumerable*   BACTERIA UA /hpf Occasional   EPITHELIAL CELLS WET PREP /hpf Occasional   MUCUS THREADS  Occasional*     Results from last 7 days   Lab Units 11/15/24  0111   URINE CULTURE  50,000-59,000 cfu/ml     Past Medical History:   Diagnosis Date    Bleeding from colostomy stoma (HCC) 2/11/2023     states ileostomy , not colostomy    Cancer (HCC)     Colon cancer (HCC)     Elevated serum creatinine 09/11/2024    Fall     Headache     Hemochromatosis, unspecified     History of transfusion     2022 - no adverse reaction    Hypertension     Hypokalemia 09/12/2024    Kidney calculi     Kidney stone     Liver disease     hemangioma    Muscle weakness     Personal history of COVID-19 12/2022    Sarcoidosis     Seizures (HCC)     2022    Shingles      Present on Admission:    Malignant neoplasm of sigmoid colon (HCC)   Sarcoidosis   Bilateral hydronephrosis   Hypercalcemia   Nephrolithiasis   Iron deficiency anemia      Admitting Diagnosis: Nausea [R11.0]  Flank pain [R10.9]  Hydronephrosis, right [N13.30]  Hydronephrosis due to ureteral stricture [N13.1]  Pyelonephritis of right kidney [N12]  Age/Sex: 59 y.o. female    Network Utilization Review Department  ATTENTION: Please call with any questions or concerns to [phone] and carefully listen to the prompts so that you are directed to the right person. All voicemails are confidential.   For Discharge needs, contact Care Management DC Support Team at [phone] opt. [phone]  Send all requests for admission clinical reviews, approved or denied determinations and any other requests to dedicated fax number below belonging to the campus where the patient is receiving treatment. List of dedicated fax numbers for the Facilities:  FACILITY NAME UR FAX NUMBER   ADMISSION DENIALS (Administrative/Medical Necessity) [phone]   DISCHARGE SUPPORT TEAM (NETWORK) [phone]   PARENT CHILD HEALTH (Maternity/NICU/Pediatrics) [phone]   [facility] [phone]   [facility] [phone]   [facility] [phone]   [facility] [phone]   [facility] [phone]   [facility] [phone]   [facility] [phone]   [facility] [phone]   [facility] [phone]   [facility] [phone]   [facility] [phone]   [facility] [phone]

## 2024-11-18 NOTE — UTILIZATION REVIEW
NOTIFICATION OF INPATIENT ADMISSION   AUTHORIZATION REQUEST   SERVICING FACILITY:   [place] [address]  Tax ID: [id number]  NPI: [id number]   ATTENDING PROVIDER:  Attending Name and NPI#: Wai Rider Md [[id number]]  Address: [address] [place] [address]  Phone: [phone]     ADMISSION INFORMATION:  Place of Service: Inpatient Acute [facility]  Place of Service Code: 21  Inpatient Admission Date/Time: 11/15/24  4:59 AM  Discharge Date/Time: 11/16/2024  2:09 PM  Admitting Diagnosis Code/Description:  Nausea [R11.0]  Flank pain [R10.9]  Hydronephrosis, right [N13.30]  Hydronephrosis due to ureteral stricture [N13.1]  Pyelonephritis of right kidney [N12]     UTILIZATION REVIEW CONTACT:  Octavia Tan Utilization   Network Utilization Review Department  Phone: [phone]  Fax: [phone]  Email: [email or web address]  Contact for approvals/pending authorizations, clinical reviews, and discharge.     PHYSICIAN ADVISORY SERVICES:  Medical Necessity Denial & Pauy-ze-Ujxp Review  Phone: [phone]  Fax: [phone]  Email: [email or web address]     DISCHARGE SUPPORT TEAM:  For Patients Discharge Needs & Updates  Phone: [phone] opt. 2 Fax: [phone]  Email: [email or web address]

## 2024-11-18 NOTE — TELEPHONE ENCOUNTER
Spoke directly with Pt's brother, Mateo Haque, today via phone call @ [phone].  Mr. Haque states Pt is aware of upcoming Hepatology appointment on 11/21/24 with Dr. Reyes.  Mr. Haque states Pt plans to attend said appointment if she is feeling well.  Office phone number given to Mr. Haque should Pt need to reschedule appointment.

## 2024-11-21 ENCOUNTER — OFFICE VISIT (OUTPATIENT)
Age: 59
End: 2024-11-21
Payer: COMMERCIAL

## 2024-11-21 VITALS
WEIGHT: 112 LBS | HEART RATE: 97 BPM | HEIGHT: 62 IN | DIASTOLIC BLOOD PRESSURE: 62 MMHG | BODY MASS INDEX: 20.61 KG/M2 | SYSTOLIC BLOOD PRESSURE: 114 MMHG | OXYGEN SATURATION: 98 % | TEMPERATURE: 97.6 F

## 2024-11-21 DIAGNOSIS — D86.9 SARCOIDOSIS: Primary | ICD-10-CM

## 2024-11-21 DIAGNOSIS — R74.8 ELEVATED LIVER ENZYMES: ICD-10-CM

## 2024-11-21 DIAGNOSIS — D86.89 HEPATIC GRANULOMA ASSOCIATED WITH SARCOIDOSIS: ICD-10-CM

## 2024-11-21 PROCEDURE — 99214 OFFICE O/P EST MOD 30 MIN: CPT | Performed by: INTERNAL MEDICINE

## 2024-11-21 NOTE — PROGRESS NOTES
[facility]  Outpatient Follow-up  Encounter: [id number]    PATIENT INFO     Name: Danielle Haque  YOB: 1965   Age: 59 y.o.   Sex: female   MRN: [id number]    ASSESSMENT & PLAN     1. Sarcoidosis  -     Comprehensive metabolic panel; Future; Expected date: 02/21/2025  2. Hepatic granuloma associated with sarcoidosis  3. Elevated liver enzymes     She has had progressive cholestasis dating back to 12/2022 with intraoperative findings of diffuse hepatic nodules suggestive of cirrhosis. S/p biopsy that showed hyalinized subcapsular and intra-parenchymal nodules; no evidence of malignancy; mild macrovesicular steatosis without steatohepatitis or advanced fibrosis. Pathology showed evidence of sclerotic hemangioma with secondary hemochromatosis. Repeat biopsy of liver lesion revealed non-necrotizing granulomas c/w hepatic sarcoidosis. She was started on prednisone, UDCA and fenofibrate. Unfortunately, she has had recurrent hypercalcemia suspected from her sarcoidosis with tapering of steroids. She was recently hospitalized 11/14-11/16 with nephrolithiasis and noted hypercalcemia >11. Rheumatology evaluated the patient and determined she was probably in a Sarcoid flare.  ACE level was 122 (increased from 117 in August). Vitamin D 1, 25 WNL. She completed the steroid taper that was prescribed in hospital and her hypercalcemia was managed with IVF. Today, she has no acute GI or liver related complaints. We reviewed her recent liver enzymes which appear stable with  during hospitalization. She continues on UDCA and fenofibrate. Her current goal is to proceed with colostomy reversal, so we have discussed with surgery staff in the office who will arrange for follow-up. Otherwise, no change in management at this time. We will obtain repeat CMP in 3 months and she can follow-up in 6 months.     Orders Placed This Encounter   Procedures    Comprehensive metabolic panel      FOLLOW-UP: 6 months     HISTORY OF PRESENT ILLNESS       Danielle Haque is a 59 y.o. 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 She reports feeling well since hospital discharge with no acute GI or liver related complaints. She does note some decrease in appetite. She is frustrated and would like to pursue colostomy reversal at this time but her previous appointment with colorectal was postponed in September due to hypercalcemia. We will assist with re-scheduling appointment with colorectal. No other new changes at this time.      ENDOSCOPIC HISTORY     UPPER ENDOSCOPY: No prior EGD on file; family reports upper endoscopy in [place] at least 2 years ago, cannot recall findings  COLONOSCOPY: 11/1/23 unremarkable, healthy anastomosis     REVIEW OF SYSTEMS     ROS negative other than stated above    Historical Information   Past Medical History:   Diagnosis Date    Bleeding from colostomy stoma (HCC) 2/11/2023     states ileostomy , not colostomy    Cancer (HCC)     Colon cancer (HCC)     Elevated serum creatinine 09/11/2024    Fall     Headache     Hemochromatosis, unspecified     History of transfusion     2022 - no adverse reaction    Hypertension     Hypokalemia 09/12/2024    Kidney calculi     Kidney stone     Liver disease     hemangioma    Muscle weakness     Personal history of COVID-19 12/2022    Sarcoidosis     Seizures (HCC)     2022    Shingles      Past Surgical History:   Procedure Laterality Date    ABSCESS DRAINAGE      left breast    BREAST BIOPSY      CHEST TUBE INSERTION      COLON SURGERY      colostomy    COLONOSCOPY      FL GUIDED CENTRAL VENOUS ACCESS DEVICE INSERTION  03/21/2023    FL RETROGRADE PYELOGRAM  01/23/2023    FL RETROGRADE PYELOGRAM   04/03/2023    FL RETROGRADE PYELOGRAM  7/21/2023    FL RETROGRADE PYELOGRAM  10/11/2023    FL RETROGRADE PYELOGRAM  12/15/2023    FL RETROGRADE PYELOGRAM  2/8/2024    FL RETROGRADE PYELOGRAM  5/10/2024    FL RETROGRADE PYELOGRAM  9/26/2024    FL RETROGRADE PYELOGRAM  10/18/2024    IR BIOPSY LIVER MASS  05/09/2023    LUNG BIOPSY      WY CYSTO BLADDER W/URETERAL CATHETERIZATION Bilateral 07/21/2023    Procedure: CYSTOSCOPY URETEROSCOPY RETROGRADE PYELOGRAM WITH BILATERAL STENT URETERAL EXCHANGE; LEFT LASER LITHOTRIPSY AND STONE BASKET RETRIEVAL;  Surgeon: José Luis Stephens MD;  [facility];  Service: Urology    WY CYSTO BLADDER W/URETERAL CATHETERIZATION Bilateral 12/15/2023    Procedure: CYSTOSCOPY, BILATERAL  RETROGRADE PYELOGRAM , STENT EXCHANGE RIGHT , LEFT URETEROSCOPY ,  HOLMIUM LASER WITH INSERTION LEFT URETERAL STENT;  Surgeon: Derick Bhakta MD;  [facility];  Service: Urology    WY CYSTO BLADDER W/URETERAL CATHETERIZATION Left 2/8/2024    Procedure: CYSTOSCOPY B/L RETROGRADE PYELOGRAM WITH B/L T URETERALSTENT EXCHANGE,LEFT URETEROSCOPY, DILATION LEFT URETERAL STICTURE;  Surgeon: José Luis Stephens MD;  [facility];  Service: Urology    WY CYSTO BLADDER W/URETERAL CATHETERIZATION Bilateral 5/10/2024    Procedure: CYSTOSCOPY RETROGRADE PYELOGRAM WITH INSERTION STENT URETERAL;  Surgeon: José Luis Stephens MD;  [facility];  Service: Urology    WY CYSTO W/INSERT URETERAL STENT Bilateral 5/10/2024    Procedure: EXCHANGE STENT URETERAL;  Surgeon: José Luis Stephens MD;  [facility];  Service: Urology    WY CYSTO W/INSERT URETERAL STENT Bilateral 10/18/2024    Procedure: EXCHANGE STENT URETERAL;  Surgeon: José Luis Stephens MD;  [facility];  Service: Urology    WY CYSTO/URETERO W/LITHOTRIPSY &INDWELL STENT INSRT Bilateral 01/23/2023    Procedure: CYSTOSCOPY  RIGHT URETEROSCOPY WITH LITHOTRIPSY HOLMIUM LASER, BILATERAL RETROGRADE  PYELOGRAM AND BILATERAL  URETERAL STENT INSERTION;  Surgeon: José Luis Stephens MD;  [facility];  Service: Urology    DE CYSTO/URETERO W/LITHOTRIPSY &INDWELL STENT INSRT Right 04/03/2023    Procedure: RIGHT URETEROSCOPY WITH LITHOTRIPSY HOLMIUM LASER, RETROGRADE PYELOGRAM; BILATERAL EXCHANGE STENT URETERAL;  Surgeon: José Luis Stephens MD;  [facility];  Service: Urology    DE CYSTO/URETERO W/LITHOTRIPSY &INDWELL STENT INSRT Bilateral 10/11/2023    Procedure: CYSTOSCOPY URETEROSCOPY RETROGRADE PYELOGRAM AND INSERTION STENT URETERAL DIAGNOSTINC RIGHT URETEROSCOPY, REMOVAL STENT LEFT SIDE;  Surgeon: José Luis Stephens MD;  [facility];  Service: Urology    DE CYSTO/URETERO W/LITHOTRIPSY &INDWELL STENT INSRT Bilateral 9/26/2024    Procedure: CYSTOSCOPY ,LEFT  URETEROSCOPY,  BILATERAL  RETROGRADE PYELOGRAM AND BILATERAL URETERAL STENT EXCHANGE;  Surgeon: Favian Barber MD;  [facility];  Service: Urology    DE CYSTO/URETERO W/LITHOTRIPSY &INDWELL STENT INSRT Left 10/18/2024    Procedure: CYSTOSCOPY LEFT URETEROSCOPY, treatment of LEFT ureteral stricture, RETROGRADE PYELOGRAM AND BILATERAL STENT EXCHANGE;  Surgeon: José Luis Stephens MD;  [facility];  Service: Urology    DE INSJ TUNNELED CTR VAD W/SUBQ PORT AGE 5 YR/> N/A 03/21/2023    Procedure: INSERTION VENOUS PORT ( PORT-A-CATH) IR;  Surgeon: Mark Amos DO;  [facility];  Service: Interventional Radiology    DE LAPS COLECTOMY PRTL W/COLOPXTSTMY LW ANAST N/A 8/17/2023    Procedure: RESECTION COLON LOW ANTERIOR LAPAROSCOPIC WITH ROBOTICS;  Surgeon: Sree Umanzor MD;  [facility];  Service: Colorectal    TONSILLECTOMY      URINARY SURGERY Bilateral     bilateral stents     Social History   Social History     Substance and Sexual Activity   Alcohol Use Never     Social History     Substance and Sexual Activity   Drug Use Never     Social History     Tobacco Use   Smoking  "Status Never    Passive exposure: Past   Smokeless Tobacco Never     Family History   Problem Relation Age of Onset    Cancer Mother     Cirrhosis Father     Breast cancer Neg Hx     Breast cancer additional onset Neg Hx          MEDICATIONS AND ALLERGIES     Current Outpatient Medications   Medication Instructions    acetaminophen (TYLENOL) 650 mg, Oral, Every 4 hours PRN    cyanocobalamin (VITAMIN B-12) 1,000 mcg, Daily    docusate sodium (COLACE) 100 mg, Oral, 2 times daily    fenofibrate (TRICOR) 145 mg, Oral, Daily    ferrous sulfate 324 mg, Oral, Daily before breakfast    lisinopril (ZESTRIL) 5 mg, Oral, Daily    ondansetron (ZOFRAN) 4 mg, Oral, Every 8 hours PRN    oxybutynin (DITROPAN) 5 mg, Oral, Every 6 hours PRN    potassium chloride (MICRO-K) 10 MEQ CR capsule 40 mEq, Oral, 2 times daily    Premarin 0.3 g, Vaginal, 3 times weekly    tamsulosin (FLOMAX) 0.4 mg, Oral, Daily with dinner    ursodiol (ACTIGALL) 900 mg, Oral, Daily     Allergies   Allergen Reactions    Oxaliplatin Shortness Of Breath     Reactions occurred with 2nd and 3rd infusions (about 1-3 hours from initiation of infusion) and required treatment with steroids and antihistamines. Please refer to allergy note on 2/7/2023 for detailed description of her reactions.    Morphine Nausea Only     \"Every dose made me nauseous\" (oral dosing only, can tolerate IV morphine)    Potassium Chloride Other (See Comments)     Pt reports \"burning\" with Iv potassium administration and wishes for it to be added to chart       PHYSICAL EXAM      Objective   Blood pressure 114/62, pulse 97, temperature 97.6 °F (36.4 °C), temperature source Tympanic Core, height 5' 2\" (1.575 m), weight 50.8 kg (112 lb), SpO2 98%. Body mass index is 20.49 kg/m².    General Appearance:   Alert, cooperative, no distress   HEENT:   Normocephalic, atraumatic, anicteric     Neck:   Supple, symmetrical, trachea midline   Lungs:   Equal chest rise, respirations unlabored    Heart:   " Regular rate and rhythm   Abdomen:   Soft, non-tender, non-distended; normal bowel sounds; no masses, no organomegaly. Colostomy in place   Rectal:   Deferred    Extremities:   No cyanosis, clubbing or edema    Neuro:   No obvious focal deficits, moves extremities spontaneously    Skin:   No visible jaundice, rashes, or lesions      LABORATORY RESULTS     No visits with results within 1 Day(s) from this visit.   Latest known visit with results is:   Admission on 11/14/2024, Discharged on 11/16/2024   Component Date Value    WBC 11/14/2024 6.41     RBC 11/14/2024 3.73 (L)     Hemoglobin 11/14/2024 11.1 (L)     Hematocrit 11/14/2024 33.4 (L)     MCV 11/14/2024 90     MCH 11/14/2024 29.8     MCHC 11/14/2024 33.2     RDW 11/14/2024 14.2     MPV 11/14/2024 9.2     Platelets 11/14/2024 259     nRBC 11/14/2024 0     Segmented % 11/14/2024 80 (H)     Immature Grans % 11/14/2024 0     Lymphocytes % 11/14/2024 6 (L)     Monocytes % 11/14/2024 7     Eosinophils Relative 11/14/2024 7 (H)     Basophils Relative 11/14/2024 0     Absolute Neutrophils 11/14/2024 5.14     Absolute Immature Grans 11/14/2024 0.02     Absolute Lymphocytes 11/14/2024 0.35 (L)     Absolute Monocytes 11/14/2024 0.43     Eosinophils Absolute 11/14/2024 0.45     Basophils Absolute 11/14/2024 0.02     Sodium 11/14/2024 130 (L)     Potassium 11/14/2024 4.2     Chloride 11/14/2024 100     CO2 11/14/2024 22     ANION GAP 11/14/2024 8     BUN 11/14/2024 22     Creatinine 11/14/2024 1.11     Glucose 11/14/2024 121     Calcium 11/14/2024 12.0 (H)     AST 11/14/2024 27     ALT 11/14/2024 19     Alkaline Phosphatase 11/14/2024 116 (H)     Total Protein 11/14/2024 7.7     Albumin 11/14/2024 4.2     Total Bilirubin 11/14/2024 0.62     eGFR 11/14/2024 55     Lipase 11/14/2024 80     Color, UA 11/15/2024 Light Yellow     Clarity, UA 11/15/2024 Turbid     Specific Gravity, UA 11/15/2024 1.010     pH, UA 11/15/2024 5.5     Leukocytes, UA 11/15/2024 Large (A)     Nitrite,  UA 11/15/2024 Negative     Protein, UA 11/15/2024 Trace (A)     Glucose, UA 11/15/2024 Negative     Ketones, UA 11/15/2024 Negative     Urobilinogen, UA 11/15/2024 <2.0     Bilirubin, UA 11/15/2024 Negative     Occult Blood, UA 11/15/2024 Large (A)     RBC, UA 11/15/2024 Innumerable (A)     WBC, UA 11/15/2024 Innumerable (A)     Epithelial Cells 11/15/2024 Occasional     Bacteria, UA 11/15/2024 Occasional     MUCUS THREADS 11/15/2024 Occasional (A)     Ca Oxalate Lisa, UA 11/15/2024 Occasional (A)     WBC Clumps 11/15/2024 Present     Budding Yeast 11/15/2024 Present     Urine Culture 11/15/2024 50,000-59,000 cfu/ml     Sodium 11/15/2024 132 (L)     Potassium 11/15/2024 4.6     Chloride 11/15/2024 104     CO2 11/15/2024 21     ANION GAP 11/15/2024 7     BUN 11/15/2024 20     Creatinine 11/15/2024 1.36 (H)     Glucose 11/15/2024 57 (L)     Calcium 11/15/2024 10.7 (H)     eGFR 11/15/2024 43     WBC 11/15/2024 5.66     RBC 11/15/2024 3.52 (L)     Hemoglobin 11/15/2024 10.3 (L)     Hematocrit 11/15/2024 32.0 (L)     MCV 11/15/2024 91     MCH 11/15/2024 29.3     MCHC 11/15/2024 32.2     RDW 11/15/2024 14.5     MPV 11/15/2024 9.6     Platelets 11/15/2024 215     nRBC 11/15/2024 0     Segmented % 11/15/2024 79 (H)     Immature Grans % 11/15/2024 0     Lymphocytes % 11/15/2024 7 (L)     Monocytes % 11/15/2024 8     Eosinophils Relative 11/15/2024 6     Basophils Relative 11/15/2024 0     Absolute Neutrophils 11/15/2024 4.45     Absolute Immature Grans 11/15/2024 0.01     Absolute Lymphocytes 11/15/2024 0.38 (L)     Absolute Monocytes 11/15/2024 0.44     Eosinophils Absolute 11/15/2024 0.36     Basophils Absolute 11/15/2024 0.02     Magnesium 11/14/2024 1.7 (L)     Sodium 11/16/2024 135     Potassium 11/16/2024 3.6     Chloride 11/16/2024 108     CO2 11/16/2024 21     ANION GAP 11/16/2024 6     BUN 11/16/2024 19     Creatinine 11/16/2024 1.12     Glucose 11/16/2024 88     Calcium 11/16/2024 10.0     eGFR 11/16/2024 55      WBC 11/16/2024 3.36 (L)     RBC 11/16/2024 2.67 (L)     Hemoglobin 11/16/2024 8.0 (L)     Hematocrit 11/16/2024 24.1 (L)     MCV 11/16/2024 90     MCH 11/16/2024 30.0     MCHC 11/16/2024 33.2     RDW 11/16/2024 14.4     Platelets 11/16/2024 162     MPV 11/16/2024 9.0     Ventricular Rate 11/15/2024 66     Atrial Rate 11/15/2024 66     [place] Interval 11/15/2024 194     QRSD Interval 11/15/2024 78     QT Interval 11/15/2024 376     QTC Interval 11/15/2024 394     P Axis 11/15/2024 61     QRS Adel 11/15/2024 57     T Wave Adel 11/15/2024 29      CT abdomen pelvis with contrast  Result Date: 11/15/2024  Narrative: CT ABDOMEN AND PELVIS WITH IV CONTRAST INDICATION: sudden onset right flank pain x2h, hx of kidney stones, ureteral stents, sigmoid colon cancer with ostomy. . COMPARISON: CT chest abdomen pelvis on 7/3/2024 and 10/2/2024 TECHNIQUE: CT examination of the abdomen and pelvis was performed. Multiplanar 2D reformatted images were created from the source data. This examination, like all CT scans performed in the [facility], was performed utilizing techniques to minimize radiation dose exposure, including the use of iterative reconstruction and automated exposure control. Radiation dose length product (DLP) for this visit: 339 mGy-cm IV Contrast: 85 mL of iohexol (OMNIPAQUE) 50 mL of iohexol (OMNIPAQUE) Enteric Contrast: Not administered. FINDINGS: ABDOMEN LOWER CHEST: Multiple pulmonary nodules in the imaged lung bases measuring up to 8 mm are again seen, slightly increased in size and number compared to the prior exam. No pleural or pericardial effusions. LIVER/BILIARY TREE: Unremarkable. GALLBLADDER: No calcified gallstones. No pericholecystic inflammatory change. SPLEEN: Splenomegaly without focal lesions PANCREAS: Unremarkable. ADRENAL GLANDS: Splenomegaly without focal lesions. KIDNEYS/URETERS: Kidneys normal in size and position. Mild left and moderate right hydronephrosis. No perinephric fluid  collections. Multiple lower pelvic phleboliths. Asymmetrical right perinephric hazy inflammatory stranding noted. Bilateral ureteral stents noted in place with distal pigtail loops within the posterior inferior bladder and proximal pigtail loops in the left upper pole collecting system and right renal pelvic regions. STOMACH AND BOWEL: Stomach is moderately distended with air and oral contrast/debris. The administered oral contrast has reached pelvic distal ileal loops. No findings of bowel obstruction or mesenteric inflammation noted. Small and large bowel loops are  normal in course and caliber without evidence for obstruction. Right ventral abdominal ileostomy. APPENDIX: No findings to suggest appendicitis. ABDOMINOPELVIC CAVITY: No ascites or loculated fluid collection. No pneumoperitoneum. No lymphadenopathy by size criteria. Nonspecific subcentimeter left retroperitoneal/para-aortic lymph nodes are again seen.. VESSELS: Atherosclerosis without abdominal aortic aneurysm. PELVIS REPRODUCTIVE ORGANS: Unremarkable for patient's age. URINARY BLADDER: No intraluminal mass/calculi or irregular wall thickening. Distal pigtail loops of the bilateral ureteral stents noted within the bladder lumen. ABDOMINAL WALL/INGUINAL REGIONS: Unremarkable. BONES: No acute fracture or suspicious osseous lesion.     Impression: 1.  Multiple pulmonary nodules in the imaged lung bases measuring up to 8 mm are again seen, slightly increased in size and number compared to the prior exam. 2.  Splenomegaly without focal lesions. 3.  Mild left and moderate right hydronephrosis. Nonobstructing calculi in the lower pole of the right kidney. Bilateral ureteral stents noted in place with distal pigtail loops within the posterior inferior bladder and proximal pigtail loops in the left  upper pole collecting system and right renal pelvic regions.  Asymmetrical right perinephric hazy inflammatory stranding noted. Recommend correlation with  urinalysis to exclude right  tract infection. 4.  Right ventral abdominal ileostomy. Dilated distal ileal loops prior to the ileostomy site containing heterogeneous density debris. Findings may indicate focal ileus or dysmotility. No evidence for bowel obstruction, mesenteric inflammation, appendicitis, free air, or free fluid. Workstation performed: [id number]       RADIOLOGY RESULTS: I have personally reviewed pertinent imaging studies.      Oscar Reyes DO  Gastroenterology Fellow  [facility]  Division of Gastroenterology & Hepatology    ** Please Note: This note is constructed using a voice recognition dictation system. **

## 2024-11-24 NOTE — PROGRESS NOTES
Name: Danielle Haque      : 1965      MRN: [id number]  Encounter Provider: Jennifer Canseco MD  Encounter Date: 2024   Encounter department: [facility] [address]  :  Assessment & Plan  Sarcoidosis  Pt with pulmonary and hepatic sarcoidosis presenting for follow-up for starting steroid-sparing agent since she had been having increasing hypercalcemia that was responsive to steroids in the past, but her nephrologist and GI doctor want to start steroid-sparing agent in lieu of planning colostomy reversal surgery in the near future. Discussed starting methotrexate with the patient, advised about not drinking alcohol, benefits and risks were discussed. Otherwise her sarcoidosis seems stable at this time.  Plan:  Start MTX 6 tablets per week with daily folic acid  Labs in 1 month and every 3 months  RTC 3 mo  Orders:    methotrexate 2.5 MG tablet; Take 6 tablets (15 mg total) by mouth once a week    C-reactive protein; Standing    Sedimentation rate, automated; Standing    Comprehensive metabolic panel; Standing    CBC and differential; Standing    folic acid (FOLVITE) 1 mg tablet; Take 1 tablet (1 mg total) by mouth daily    Need for hepatitis B screening test    Orders:    Hepatitis B core antibody, total; Future    Hepatitis B surface antibody    Hepatitis B surface antigen; Future    Need for hepatitis C screening test    Orders:    Hepatitis C antibody; Future    Screening for tuberculosis    Orders:    Quantiferon TB Gold Plus Assay; Future        History of Present Illness     Female  Problem  The patient's primary symptoms include genital itching. The patient's pertinent negatives include no genital lesions, genital odor, genital rash, missed menses or vaginal bleeding. This is a recurrent problem. The current episode started in the past 7 days. The problem occurs 2 to 4 times per day. The problem has been gradually worsening. The pain is severe. The problem affects both sides.  She is not pregnant. Associated symptoms include anorexia, back pain, diarrhea, discolored urine, dysuria, flank pain, frequency and urgency. Pertinent negatives include no joint pain, joint swelling or painful intercourse. Nothing aggravates the symptoms. She is not sexually active. No, her partner does not have an STD. She uses nothing for contraception. She is postmenopausal.     Danielle Haque is a 59 y.o. female who presents for follow up of sarcoidosis.     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   -Patient was referred to Rheumatology in July 2023 for evaluation of alternative organ involvement of her sarcoidosis.   -Patient was hospitalized in September for labs showing hypercalcemia with Ca level above 11, at which time Rheumatology had been consulted and determined she was probably in a Sarcoid flare and had been prescribed a steroid taper.ACE level was 122 (increased from 117 in August, 191 in May). Vitamin D 1,25 was within normal limits. Her calcium level last checked at the end of September had normalized to 9. She had completed the steroid taper that was prescribed in hospital.     Patient has continued to have high Calcium levels, and since chronic steroid therapy was not a good long-term solution, Nephrology and GI have had a multi-disciplinary conversation with us about starting patient on a steroid-sparing agent such as methotrexate.    At that time she is not having any systemic symptoms and autoimmune serologic workup thereafter was negative for other underlying rheumatologic disorders.    She does complain of dysuria and abdominal pain in relation to kidney stones.     She is anxious to get colostomy reversal surgery done as soon as possible.        Review of Systems   Constitutional: Negative.    HENT: Negative.  "    Eyes: Negative.    Respiratory: Negative.     Cardiovascular: Negative.    Gastrointestinal:  Positive for anorexia and diarrhea.   Genitourinary:  Positive for dysuria, flank pain, frequency and urgency. Negative for missed menses.   Musculoskeletal:  Positive for back pain. Negative for joint pain.   Skin: Negative.    Neurological: Negative.           Objective   /60   Pulse 79   Temp 97.8 °F (36.6 °C)   Ht 5' 2\" (1.575 m)   Wt 51.4 kg (113 lb 6.4 oz)   SpO2 99%   BMI 20.74 kg/m²      Physical Exam  Constitutional:       Appearance: Normal appearance.   HENT:      Head: Normocephalic.   Eyes:      Extraocular Movements: Extraocular movements intact.      Pupils: Pupils are equal, round, and reactive to light.   Cardiovascular:      Pulses: Normal pulses.      Heart sounds: Normal heart sounds.   Pulmonary:      Effort: Pulmonary effort is normal.      Breath sounds: Normal breath sounds.   Musculoskeletal:      Comments: MSK Exam  The following areas have been examined today and do not show any signs of synovitis, tenderness, or restricted ROM unless otherwise specified below:  Shoulders  Elbows  Wrists  Hands  Hips  Knees  Ankles  Feet    Neurological:      Mental Status: She is alert and oriented to person, place, and time.           "

## 2024-11-25 ENCOUNTER — OFFICE VISIT (OUTPATIENT)
Dept: INTERNAL MEDICINE CLINIC | Facility: CLINIC | Age: 59
End: 2024-11-25
Payer: COMMERCIAL

## 2024-11-25 VITALS
WEIGHT: 112.4 LBS | BODY MASS INDEX: 20.56 KG/M2 | HEART RATE: 114 BPM | SYSTOLIC BLOOD PRESSURE: 108 MMHG | DIASTOLIC BLOOD PRESSURE: 62 MMHG | OXYGEN SATURATION: 98 %

## 2024-11-25 DIAGNOSIS — N94.9 PAIN OF FEMALE GENITALIA: ICD-10-CM

## 2024-11-25 DIAGNOSIS — Z12.39 ENCOUNTER FOR SCREENING FOR MALIGNANT NEOPLASM OF BREAST, UNSPECIFIED SCREENING MODALITY: ICD-10-CM

## 2024-11-25 DIAGNOSIS — M54.9 BACK PAIN, UNSPECIFIED BACK LOCATION, UNSPECIFIED BACK PAIN LATERALITY, UNSPECIFIED CHRONICITY: ICD-10-CM

## 2024-11-25 DIAGNOSIS — R29.898 WEAKNESS OF BOTH LOWER EXTREMITIES: ICD-10-CM

## 2024-11-25 DIAGNOSIS — E83.52 HYPERCALCEMIA: Primary | ICD-10-CM

## 2024-11-25 PROCEDURE — 99495 TRANSJ CARE MGMT MOD F2F 14D: CPT | Performed by: GENERAL ACUTE CARE HOSPITAL

## 2024-11-25 NOTE — PATIENT INSTRUCTIONS
Schedule mammogram and DEXA bone scan  Discuss with colorectal surgeon about colonoscopy for cancer monitor

## 2024-11-25 NOTE — ASSESSMENT & PLAN NOTE
This feels different to her on and off kidney pain.   This pain worsens when she stands for long  Lumbar issue is likely  Refer to PT    Orders:    Ambulatory Referral to Physical Therapy; Future

## 2024-11-25 NOTE — PROGRESS NOTES
Name: Danielle Haque      : 1965      MRN: [id number]  Encounter Provider: Leena Anaya MD  Encounter Date: 2024   [facility]  :  Assessment & Plan  Hypercalcemia  Recheck lab panel    Orders:    Comprehensive metabolic panel; Future    Encounter for screening for malignant neoplasm of breast, unspecified screening modality    Orders:    Mammo screening bilateral w 3d and cad; Future    Back pain, unspecified back location, unspecified back pain laterality, unspecified chronicity  This feels different to her on and off kidney pain.   This pain worsens when she stands for long  Lumbar issue is likely  Refer to PT    Orders:    Ambulatory Referral to Physical Therapy; Future    Weakness of both lower extremities    Orders:    Ambulatory Referral to Physical Therapy; Future    Pain of female genitalia  Says it feels similar to her previous stent-related pain  Denies association with urination, denies vaginal discharge or rash  Advice to reach out to her urologist                History of Present Illness     HPI  Discharged   Pyelo on CT  Urology rec medical management  3 days of keflex on d/c    Now feels she has the pain back  In genital area  No association with urination  Says it feels like stent-related pain, which usually starts around genital area    TCM Call       Date and time call was made  2024 10:03 AM    Hospital care reviewed  Records not available    Patient was hospitialized at  [facility]    Date of Admission  24    Date of discharge  24    Diagnosis  Bilateral hydronephrosis    Disposition  Home    Were the patients medications reviewed and updated  No    Current Symptoms  None    Weakness severity  Severe          TCM Call       Post hospital issues  None    Should patient be enrolled in anticoag monitoring?  No    Scheduled for follow up?  Yes    Did you obtain your prescribed medications  Yes    Do you need  help managing your prescriptions or medications  No    Is transportation to your appointment needed  No    I have advised the patient to call PCP with any new or worsening symptoms  [facility] - MR/MA    Living Arrangements  Friends    Are you recieving any outpatient services  No    Are you recieving home care services  No    Types of home care services  Nurse visit    Are you using any community resources  No    Have you fallen in the last 12 months  No    How many times  Once    Interperter language line needed  No            Review of Systems       Objective   /62 (BP Location: Left arm, Patient Position: Sitting, Cuff Size: Standard)   Pulse (!) 114   Wt 51 kg (112 lb 6.4 oz)   SpO2 98%   BMI 20.56 kg/m²      Physical Exam

## 2024-11-25 NOTE — ASSESSMENT & PLAN NOTE
Says it feels similar to her previous stent-related pain  Denies association with urination, denies vaginal discharge or rash  Advice to reach out to her urologist

## 2024-11-26 ENCOUNTER — OFFICE VISIT (OUTPATIENT)
Age: 59
End: 2024-11-26
Payer: COMMERCIAL

## 2024-11-26 VITALS
BODY MASS INDEX: 20.87 KG/M2 | DIASTOLIC BLOOD PRESSURE: 60 MMHG | TEMPERATURE: 97.8 F | HEIGHT: 62 IN | HEART RATE: 79 BPM | SYSTOLIC BLOOD PRESSURE: 108 MMHG | WEIGHT: 113.4 LBS | OXYGEN SATURATION: 99 %

## 2024-11-26 DIAGNOSIS — D86.9 SARCOIDOSIS: Primary | ICD-10-CM

## 2024-11-26 DIAGNOSIS — Z11.59 NEED FOR HEPATITIS B SCREENING TEST: ICD-10-CM

## 2024-11-26 DIAGNOSIS — Z11.1 SCREENING FOR TUBERCULOSIS: ICD-10-CM

## 2024-11-26 DIAGNOSIS — Z11.59 NEED FOR HEPATITIS C SCREENING TEST: ICD-10-CM

## 2024-11-26 PROCEDURE — 99215 OFFICE O/P EST HI 40 MIN: CPT | Performed by: STUDENT IN AN ORGANIZED HEALTH CARE EDUCATION/TRAINING PROGRAM

## 2024-11-26 RX ORDER — FOLIC ACID 1 MG/1
1 TABLET ORAL DAILY
Qty: 30 TABLET | Refills: 5 | Status: SHIPPED | OUTPATIENT
Start: 2024-11-26

## 2024-11-26 RX ORDER — METHOTREXATE 2.5 MG/1
15 TABLET ORAL WEEKLY
Qty: 72 TABLET | Refills: 2 | Status: SHIPPED | OUTPATIENT
Start: 2024-11-26

## 2024-11-26 NOTE — ASSESSMENT & PLAN NOTE
Pt with pulmonary and hepatic sarcoidosis presenting for follow-up for starting steroid-sparing agent since she had been having increasing hypercalcemia that was responsive to steroids in the past, but her nephrologist and GI doctor want to start steroid-sparing agent in lieu of planning colostomy reversal surgery in the near future. Discussed starting methotrexate with the patient, advised about not drinking alcohol, benefits and risks were discussed. Otherwise her sarcoidosis seems stable at this time.  Plan:  Start MTX 6 tablets per week with daily folic acid  Labs in 1 month and every 3 months  RTC 3 mo  Orders:    methotrexate 2.5 MG tablet; Take 6 tablets (15 mg total) by mouth once a week    C-reactive protein; Standing    Sedimentation rate, automated; Standing    Comprehensive metabolic panel; Standing    CBC and differential; Standing    folic acid (FOLVITE) 1 mg tablet; Take 1 tablet (1 mg total) by mouth daily

## 2024-11-26 NOTE — PATIENT INSTRUCTIONS
Get labs done in 1 month and then every 3 months    Take Methotrexate 6 tablets once a week.  Take folic acid 1 mg every day.

## 2024-12-02 ENCOUNTER — OFFICE VISIT (OUTPATIENT)
Dept: PULMONOLOGY | Facility: CLINIC | Age: 59
End: 2024-12-02
Payer: COMMERCIAL

## 2024-12-02 VITALS
HEART RATE: 101 BPM | SYSTOLIC BLOOD PRESSURE: 126 MMHG | TEMPERATURE: 97.5 F | BODY MASS INDEX: 20.61 KG/M2 | HEIGHT: 62 IN | OXYGEN SATURATION: 100 % | DIASTOLIC BLOOD PRESSURE: 72 MMHG | WEIGHT: 112 LBS

## 2024-12-02 DIAGNOSIS — E83.52 HYPERCALCEMIA: ICD-10-CM

## 2024-12-02 DIAGNOSIS — D50.9 IRON DEFICIENCY ANEMIA, UNSPECIFIED IRON DEFICIENCY ANEMIA TYPE: ICD-10-CM

## 2024-12-02 DIAGNOSIS — Z01.811 ENCOUNTER FOR PREOPERATIVE PULMONARY EXAMINATION: ICD-10-CM

## 2024-12-02 DIAGNOSIS — D86.9 SARCOIDOSIS: ICD-10-CM

## 2024-12-02 DIAGNOSIS — R91.8 PULMONARY NODULES: Primary | ICD-10-CM

## 2024-12-02 DIAGNOSIS — C18.7 MALIGNANT NEOPLASM OF SIGMOID COLON (HCC): ICD-10-CM

## 2024-12-02 DIAGNOSIS — N20.0 NEPHROLITHIASIS: Chronic | ICD-10-CM

## 2024-12-02 PROCEDURE — 99215 OFFICE O/P EST HI 40 MIN: CPT | Performed by: INTERNAL MEDICINE

## 2024-12-02 PROCEDURE — 94010 BREATHING CAPACITY TEST: CPT | Performed by: INTERNAL MEDICINE

## 2024-12-02 NOTE — PROGRESS NOTES
Pulmonary Follow Up Note   Danielle Haque 59 y.o. female MRN: [id number]  12/2/2024      Assessment:  Sarcoidosis - lung and hepatic involvement with hypercalcemia   Spirometry is normal  Overall CT images have improved from initial scans  LFTs have improved  Primary symptomatology currently is her hypercalcemia now on methotrexate  Would follow up repeat labs in 4-6 weeks, if hypercalcemia worsens will likely need oral steroids during transition from methotrexate  Will defer further immunosuppression regimen to rheumatology  but remain available for assistance/discussions given complexity of her case  Will review repeat CT imaging planned for April 2025  Follow up in 4 months     Colon Cancer post ileostomy and now resection  Defer further management to medical oncology  Pre-operative Pulmonary Recommendations  Patient has sarcoid with lung nodules comorbid conditions  Planned procedure is ileostomy reversal   Per BRICE the patient is at intermediate to high risk of combined pulmonary complications (respiratory failure, pneumonia, atelectasis, pleural effusion, etc)  Per TAMIKA the patient is at 4.1% risk of respiratory failure (need for reintubation at 28 days or mechanical ventilatory support greater than 48 hours)  The patient current has NO absolute pulmonary contraindications to surgery  The patient should be wheeze free and at baseline respiratory status on the day of surgery  Please feel free to call with any further questions or concerns       Liver disease - hepatic sarcoid   Granulomas on repeat biopsy  Continue to trend LFTs serially given methotrexate use     Recurrent nephrolithiasis with ureteral obstruction in setting of hypercalcemia  Further care per urology  Need to trend serial Calcium levels during therapy    I have spent a total time of 45 minutes in caring for this patient on the day of the visit/encounter including Diagnostic results, Risks and benefits of tx options, Instructions for  management, Impressions, Counseling / Coordination of care, and Reviewing / ordering tests, medicine, procedures  .        Plan:    Diagnoses and all orders for this visit:    Pulmonary nodules    Sarcoidosis  -     Ambulatory Referral to Pulmonology  -     POCT spirometry    Malignant neoplasm of sigmoid colon (HCC)    Nephrolithiasis    Hypercalcemia    Encounter for preoperative pulmonary examination        Return in about 4 months (around 4/2/2025) for Recheck.    History of Present Illness   HPI:  Danielle Haque is a 59 y.o. female who presented in March 2023 for sarcoid evaluation.  She has a complicated history and has primarily been treated at [facility].  She states she had 1-2 years of worsened dyspnea and chest pain. She had developed kidney stones with hypercalcemia (14.8 May 2022) and had CT revealing miliary pattern on her lung images.  She was eventually seen by pulmonary (Dr. De La O) and referred for surgical pleural biopsy for miliary pattern and pleural thickening on CT Chest and concern for MTB vs sarcoid.  Biopsy revealed NC granulomas and she was initiated on prednisone 40mg daily around July 2022. During this evaluation she had CT/PET which revealed lower sigmoid FDG avid lesion.  She was diagnosed with colon cancer had diverting colostomy and started on chemotherapy and XRT.  She had abnormal LFTs and underwent liver biopsy revealing concerns for hemochromatosis.   She had moved to [place] which had delayed some further cancer therapies.  She was last seen by me in June 2023. Since that time she has completed her cancer therapies and underwent tumor resection.  She has had recurrent episodes of hypercalcemia treated with OCS with improvements.  She has now been started last week on methotrexate by rheumatology in hopes of remaining off OCS to allow for colostomy reversal.  She presents today for pulmonary follow up.    She reports feeling improved, eager for colostomy reversal. She  denied cough, no dyspnea with ADLs or EADLs, no sputum production, no chest pain, no pleurisy, no hemoptysis. She desires to remain off OCS if possible. She denied any recent bronchitis or pneumonia. She denied any inhaler use.  She denied vision changes, no rashes, no extrathoracic adenopathy.        Review of Systems   Constitutional:  Negative for activity change, chills and fever.   HENT:  Negative for mouth sores, sore throat and trouble swallowing.    Eyes:  Negative for pain and visual disturbance.   Respiratory:  Negative for cough, chest tightness, shortness of breath and wheezing.    Cardiovascular:  Negative for chest pain and leg swelling.   Gastrointestinal:  Negative for abdominal pain, nausea and vomiting.   Endocrine: Negative for cold intolerance and heat intolerance.   Genitourinary:  Negative for hematuria.   Musculoskeletal:  Negative for gait problem.   Skin:  Negative for rash.   Allergic/Immunologic: Positive for immunocompromised state. Negative for environmental allergies.   Neurological:  Negative for light-headedness and headaches.   Hematological:  Negative for adenopathy.   Psychiatric/Behavioral:  Negative for sleep disturbance. The patient is not nervous/anxious.        Historical Information   Past Medical History:   Diagnosis Date    Bleeding from colostomy stoma (HCC) 2/11/2023     states ileostomy , not colostomy    Cancer (HCC)     Colon cancer (HCC)     Elevated serum creatinine 09/11/2024    Fall     Headache     Hemochromatosis, unspecified     History of transfusion     2022 - no adverse reaction    Hypertension     Hypokalemia 09/12/2024    Kidney calculi     Kidney stone     Liver disease     hemangioma    Muscle weakness     Personal history of COVID-19 12/2022    Sarcoidosis     Seizures (HCC)     2022    Shingles      Past Surgical History:   Procedure Laterality Date    ABSCESS DRAINAGE      left breast    BREAST BIOPSY      CHEST TUBE INSERTION      COLON SURGERY       colostomy    COLONOSCOPY      FL GUIDED CENTRAL VENOUS ACCESS DEVICE INSERTION  03/21/2023    FL RETROGRADE PYELOGRAM  01/23/2023    FL RETROGRADE PYELOGRAM  04/03/2023    FL RETROGRADE PYELOGRAM  7/21/2023    FL RETROGRADE PYELOGRAM  10/11/2023    FL RETROGRADE PYELOGRAM  12/15/2023    FL RETROGRADE PYELOGRAM  2/8/2024    FL RETROGRADE PYELOGRAM  5/10/2024    FL RETROGRADE PYELOGRAM  9/26/2024    FL RETROGRADE PYELOGRAM  10/18/2024    IR BIOPSY LIVER MASS  05/09/2023    LUNG BIOPSY      ID CYSTO BLADDER W/URETERAL CATHETERIZATION Bilateral 07/21/2023    Procedure: CYSTOSCOPY URETEROSCOPY RETROGRADE PYELOGRAM WITH BILATERAL STENT URETERAL EXCHANGE; LEFT LASER LITHOTRIPSY AND STONE BASKET RETRIEVAL;  Surgeon: José Luis Stephens MD;  [facility];  Service: Urology    ID CYSTO BLADDER W/URETERAL CATHETERIZATION Bilateral 12/15/2023    Procedure: CYSTOSCOPY, BILATERAL  RETROGRADE PYELOGRAM , STENT EXCHANGE RIGHT , LEFT URETEROSCOPY ,  HOLMIUM LASER WITH INSERTION LEFT URETERAL STENT;  Surgeon: Derick Bhakta MD;  [facility];  Service: Urology    ID CYSTO BLADDER W/URETERAL CATHETERIZATION Left 2/8/2024    Procedure: CYSTOSCOPY B/L RETROGRADE PYELOGRAM WITH B/L T URETERALSTENT EXCHANGE,LEFT URETEROSCOPY, DILATION LEFT URETERAL STICTURE;  Surgeon: José Luis Stephens MD;  [facility];  Service: Urology    ID CYSTO BLADDER W/URETERAL CATHETERIZATION Bilateral 5/10/2024    Procedure: CYSTOSCOPY RETROGRADE PYELOGRAM WITH INSERTION STENT URETERAL;  Surgeon: José Luis Stephens MD;  [facility];  Service: Urology    ID CYSTO W/INSERT URETERAL STENT Bilateral 5/10/2024    Procedure: EXCHANGE STENT URETERAL;  Surgeon: José Luis Stephens MD;  [facility];  Service: Urology    ID CYSTO W/INSERT URETERAL STENT Bilateral 10/18/2024    Procedure: EXCHANGE STENT URETERAL;  Surgeon: José Luis Stephens MD;  [facility];  Service: Urology    ID  CYSTO/URETERO W/LITHOTRIPSY &INDWELL STENT INSRT Bilateral 01/23/2023    Procedure: CYSTOSCOPY  RIGHT URETEROSCOPY WITH LITHOTRIPSY HOLMIUM LASER, BILATERAL RETROGRADE PYELOGRAM AND BILATERAL  URETERAL STENT INSERTION;  Surgeon: José Luis Stephens MD;  [facility];  Service: Urology    OK CYSTO/URETERO W/LITHOTRIPSY &INDWELL STENT INSRT Right 04/03/2023    Procedure: RIGHT URETEROSCOPY WITH LITHOTRIPSY HOLMIUM LASER, RETROGRADE PYELOGRAM; BILATERAL EXCHANGE STENT URETERAL;  Surgeon: José Luis Stephens MD;  [facility];  Service: Urology    OK CYSTO/URETERO W/LITHOTRIPSY &INDWELL STENT INSRT Bilateral 10/11/2023    Procedure: CYSTOSCOPY URETEROSCOPY RETROGRADE PYELOGRAM AND INSERTION STENT URETERAL DIAGNOSTINC RIGHT URETEROSCOPY, REMOVAL STENT LEFT SIDE;  Surgeon: Jsoé Luis Stephens MD;  [facility];  Service: Urology    OK CYSTO/URETERO W/LITHOTRIPSY &INDWELL STENT INSRT Bilateral 9/26/2024    Procedure: CYSTOSCOPY ,LEFT  URETEROSCOPY,  BILATERAL  RETROGRADE PYELOGRAM AND BILATERAL URETERAL STENT EXCHANGE;  Surgeon: Favian Barber MD;  [facility];  Service: Urology    OK CYSTO/URETERO W/LITHOTRIPSY &INDWELL STENT INSRT Left 10/18/2024    Procedure: CYSTOSCOPY LEFT URETEROSCOPY, treatment of LEFT ureteral stricture, RETROGRADE PYELOGRAM AND BILATERAL STENT EXCHANGE;  Surgeon: José Luis Stephens MD;  [facility];  Service: Urology    OK INSJ TUNNELED CTR VAD W/SUBQ PORT AGE 5 YR/> N/A 03/21/2023    Procedure: INSERTION VENOUS PORT ( PORT-A-CATH) IR;  Surgeon: Mark Amos DO;  [facility];  Service: Interventional Radiology    OK LAPS COLECTOMY PRTL W/COLOPXTSTMY LW ANAST N/A 8/17/2023    Procedure: RESECTION COLON LOW ANTERIOR LAPAROSCOPIC WITH ROBOTICS;  Surgeon: Sree Umanzor MD;  [facility];  Service: Colorectal    TONSILLECTOMY      URINARY SURGERY Bilateral     bilateral stents     Family History   Problem Relation  Age of Onset    Cancer Mother     Cirrhosis Father     Diabetes Father     Breast cancer Neg Hx     Breast cancer additional onset Neg Hx          Meds/Allergies     Current Outpatient Medications:     acetaminophen (TYLENOL) 325 mg tablet, Take 2 tablets (650 mg total) by mouth every 4 (four) hours as needed for mild pain, Disp: , Rfl:     cyanocobalamin (VITAMIN B-12) 1000 MCG tablet, Take 1,000 mcg by mouth daily, Disp: , Rfl:     estrogens, conjugated (Premarin) vaginal cream, Insert 0.3 g into the vagina 3 (three) times a week, Disp: 30 g, Rfl: 6    fenofibrate (TRICOR) 145 mg tablet, Take 1 tablet (145 mg total) by mouth daily, Disp: 90 tablet, Rfl: 3    ferrous sulfate 324 (65 Fe) mg, TAKE 1 TABLET (324 MG TOTAL) BY MOUTH DAILY BEFORE BREAKFAST, Disp: 30 tablet, Rfl: 0    folic acid (FOLVITE) 1 mg tablet, Take 1 tablet (1 mg total) by mouth daily, Disp: 30 tablet, Rfl: 5    lisinopril (ZESTRIL) 5 mg tablet, Take 1 tablet (5 mg total) by mouth daily, Disp: 90 tablet, Rfl: 1    methotrexate 2.5 MG tablet, Take 6 tablets (15 mg total) by mouth once a week, Disp: 72 tablet, Rfl: 2    ondansetron (ZOFRAN) 4 mg tablet, Take 1 tablet (4 mg total) by mouth every 8 (eight) hours as needed for nausea or vomiting, Disp: 20 tablet, Rfl: 2    oxybutynin (DITROPAN) 5 mg tablet, Take 1 tablet (5 mg total) by mouth every 6 (six) hours as needed (bladder spasms), Disp: 30 tablet, Rfl: 0    potassium chloride (MICRO-K) 10 MEQ CR capsule, TAKE 4 CAPSULES (40 MEQ TOTAL) BY MOUTH 2 (TWO) TIMES A DAY, Disp: 240 capsule, Rfl: 5    tamsulosin (FLOMAX) 0.4 mg, Take 1 capsule (0.4 mg total) by mouth daily with dinner, Disp: 90 capsule, Rfl: 3    ursodiol (ACTIGALL) 300 mg capsule, Take 3 capsules (900 mg total) by mouth in the morning, Disp: 270 capsule, Rfl: 0    docusate sodium (COLACE) 100 mg capsule, Take 1 capsule (100 mg total) by mouth 2 (two) times a day for 15 days, Disp: 30 capsule, Rfl: 0  Allergies   Allergen Reactions     "Oxaliplatin Shortness Of Breath     Reactions occurred with 2nd and 3rd infusions (about 1-3 hours from initiation of infusion) and required treatment with steroids and antihistamines. Please refer to allergy note on 2/7/2023 for detailed description of her reactions.    Morphine Nausea Only     \"Every dose made me nauseous\" (oral dosing only, can tolerate IV morphine)    Potassium Chloride Other (See Comments)     Pt reports \"burning\" with Iv potassium administration and wishes for it to be added to chart       Vitals: Blood pressure 126/72, pulse 101, temperature 97.5 °F (36.4 °C), height 5' 2\" (1.575 m), weight 50.8 kg (112 lb), SpO2 100%. Body mass index is 20.49 kg/m². Oxygen Therapy  SpO2: 100 %      Physical Exam  Physical Exam  Vitals reviewed.   Constitutional:       General: She is not in acute distress.     Appearance: Normal appearance. She is well-developed and normal weight. She is not ill-appearing, toxic-appearing or diaphoretic.      Comments: Thin woman, nontoxic, conversant   HENT:      Head: Normocephalic and atraumatic.      Right Ear: External ear normal.      Left Ear: External ear normal.      Nose: Nose normal.      Mouth/Throat:      Mouth: Mucous membranes are moist.      Pharynx: Oropharynx is clear. No oropharyngeal exudate.   Eyes:      General: No scleral icterus.        Right eye: No discharge.         Left eye: No discharge.      Conjunctiva/sclera: Conjunctivae normal.      Pupils: Pupils are equal, round, and reactive to light.   Neck:      Vascular: No JVD.      Trachea: No tracheal deviation.   Cardiovascular:      Rate and Rhythm: Normal rate and regular rhythm.      Heart sounds: Normal heart sounds. No murmur heard.     No gallop.   Pulmonary:      Effort: Pulmonary effort is normal. No respiratory distress.      Breath sounds: Normal breath sounds. No stridor. No wheezing, rhonchi or rales.   Abdominal:      General: Bowel sounds are normal. There is no distension.      " "Palpations: Abdomen is soft.      Tenderness: There is no abdominal tenderness. There is no guarding or rebound.      Comments: Colostomy in place   Musculoskeletal:         General: No deformity.      Right lower leg: No edema.      Left lower leg: No edema.   Lymphadenopathy:      Cervical: No cervical adenopathy.   Skin:     General: Skin is warm and dry.      Coloration: Skin is not jaundiced.      Findings: No erythema or rash.   Neurological:      General: No focal deficit present.      Mental Status: She is alert and oriented to person, place, and time. Mental status is at baseline.   Psychiatric:         Mood and Affect: Mood normal.         Behavior: Behavior normal.         Thought Content: Thought content normal.         Labs: I have personally reviewed pertinent lab results.  Lab Results   Component Value Date    WBC 3.36 (L) 11/16/2024    HGB 8.0 (L) 11/16/2024    HCT 24.1 (L) 11/16/2024    MCV 90 11/16/2024     11/16/2024     Lab Results   Component Value Date    CALCIUM 10.0 11/16/2024    K 3.6 11/16/2024    CO2 21 11/16/2024     11/16/2024    BUN 19 11/16/2024    CREATININE 1.12 11/16/2024     No results found for: \"IGE\"  Lab Results   Component Value Date    ALT 19 11/14/2024    AST 27 11/14/2024    GGT 45 08/15/2024    ALKPHOS 116 (H) 11/14/2024     10/30/2024  -      [facility]  ACE 4/30/2022 = >360  ACE 5/2/2022 - >360  ACE 6/28/2022 -> 175     FINAL PATHOLOGIC DIAGNOSIS      A., B. PLEURA, LEFT, BIOPSIES #1 AND #2:  - NON-CASEATING GRANULOMATOUS INFLAMMATION (SEE NOTE).     NOTE: The pleura is thickened by innumerable non-caseating granulomas admixed with lymphocytes. AFB and GMS stains are pending and will be reported in an addendum. Tissue culture was also performed and those results are pending. In the absence of an infectious etiology the histologic findings, along with the clinical history and imaging, are highly suggestive of Sarcoidosis.    Electronically " "signed by Aminah Brito MD on 6/9/2022 at  2:00 PM   Intraoperative Consultation      A. PLEURA, LEFT, BIOPSY:  - NUMEROUS NON-NECROTIZING GRANULOMAS     Aminah Brito MD; 3:23 PM; 6/7/2022   Clinical Information      UNSPECIFIED EXAMINATION   Gross Description      A.  Received fresh for intraoperative consultation labeled with the patient's name, other identifying information and \"left pleural biopsy\" is a 0.9 x 0.4 x 0.2 cm aggregate of tan-gray soft friable soft tissue, submitted in toto for frozen section in A1.      B.  Received in formalin labeled with the patient's name and other identifying information and \"left pleural biopsy #2\" is a 2.2 x 2.2 x 0.6 cm aggregate of pink-tan irregular tissue fragments that are entirely submitted in 1 cassette.     MZ   #1 ADDENDUM      AFB AND GMS SPECIAL STAINS ARE NEGATIVE FOR MYCOBACTERIA AND FUNGAL ELEMENTS RESPECTIVELY.      Fungal and AFB cultures negative     FINAL PATHOLOGIC DIAGNOSIS      RECTOSIGMOID COLON, MASS, BIOPSY:  - INVASIVE MODERATELY DIFFERENTIATED COLONIC ADENOCARCINOMA.            FINAL PATHOLOGIC DIAGNOSIS      LIVER, SEGMENT 3, RESECTION:  - HYALINIZED SUBCAPSULAR AND INTRA-PARENCHYMAL NODULES.  NO MALIGNANCY SEEN. SEE NOTE  - MILD MACROVESICULAR STEATOSIS (25%). NO EVIDENCE OF STEATOHEPATITIS.  TRICHOME STAIN SHOWS MILD PORTAL FIBROSIS.   - 2+ IRON DEPOSITION WITHIN KUPFFER CELLS (IRON STAIN), CONSISTENT WITH SECONDARY HEMOCHROMATOSIS.        NOTE: THIS LIKELY REPRESENTS A MARKEDLY SCLEROTIC HEMANGIOMA  OR CHANGES SECONDARY TO INJURY.        Final Diagnosis  A. Liver, lesion:  - Mild steatosis.  - Sinusoidal dilatation with perisinusoidal fibrosis.  - Negative for metastatic carcinoma.  Note: The cores are thin, containing portions of only 4 portal tracts. At least 10 portal areas are needed for adequate evaluation of  fibrosis and general portal characteristics. In addition to mild steatosis, focal non-necrotizing granulomas is present. In " this biopsy, there  is no significant duct-centric inflammation, florid duct lesions, duct loss, cholestasis seen. The current findings are nonspecific and the  differential diagnosis includes autoimmune related injury, infectious etiology, medication, outflow obstruction among others. Clinical and  laboratory (Including AMA) correlation is suggested for definitive diagnosis.     Imaging and other studies: New images and reports personally reviewed  CT abd/pelvis 11/15/2024 - scattered nodules in lower lobes up to 8 mm, non-obstructing renal calculi with stents in place mild/mod hydronephrosis, ileostomy in place    CT C/A/P 10/2/2024 - scattered bilateral pulmonary nodules with stable 1.3cm nodular opacity in the lingula, no sig mediastinal or hilar adenopathy, no effusions    CT C/A/P 3/18/2023 - RUL interstitial opacities with some nodular features and left basilar atelectasis, no sig mediastinal adenopathy, ureteral stents, hypoattenuation inferior right hepatic lobe     CT Abd/Pelvis 2022 - decreased right hydronephrosis, stable 1cm right adrenal nodules, bilateral ureteral stents, basilar atelectasis without evidence of miliary pattern     CT C/A/P [facility] 10/25/2022 - LUNGS: The trachea and central bronchi appear patent. Interval improvement in previously seen diffuse nodular opacities throughout both lungs. Stable subpleural opacity peripheral left lung base. A 1.2 cm pleural-based opacity inferior lingula probably representing subsegmental atelectatic changes.         Pulmonary function testin2024 - Ratio 89%, FVC 2.93L (93%, Z -0.55), FEV1 2.60L (106%, Z 0.44) - normal spirometry    2023 - Ratio 82%, FVC 96%, FEV1 93%, TLC 83%, RV 64%, DLCO 74% - normal spirometry, normal TLC, normal diffusion      3/7/2023 - Ratio 86%, FVC 2.78L (87%), FEV1 2.38L (95%) - normal spirometry     EKG, Pathology, and Other Studies: I have personally reviewed pertinent reports.    TTE [facility]  10/2022 - EF 55%, mild MVR, normal RV size/funciton     Myocardial perfusion scan [facility] 10/2022 - normal myocardial perfusion study    Dandre Hopson, DO, FACP  [facility]

## 2024-12-02 NOTE — PATIENT INSTRUCTIONS
Continue methotrexate per rheumatology and follow regular labs  Will continue to monitor your CT scans with repeat in April  Your breathing tests are normal today  Will see you back again in April 2025 or sooner as needed

## 2024-12-03 RX ORDER — FERROUS SULFATE 324(65)MG
324 TABLET, DELAYED RELEASE (ENTERIC COATED) ORAL
Qty: 30 TABLET | Refills: 0 | Status: SHIPPED | OUTPATIENT
Start: 2024-12-03

## 2024-12-03 NOTE — TELEPHONE ENCOUNTER
Patient needs updated blood work. Please place orders. A courtesy refill was provided.     Iron panel needed

## 2024-12-09 ENCOUNTER — APPOINTMENT (OUTPATIENT)
Dept: LAB | Facility: AMBULARY SURGERY CENTER | Age: 59
End: 2024-12-09
Payer: COMMERCIAL

## 2024-12-09 DIAGNOSIS — D50.9 IRON DEFICIENCY ANEMIA, UNSPECIFIED IRON DEFICIENCY ANEMIA TYPE: ICD-10-CM

## 2024-12-09 DIAGNOSIS — E83.52 HYPERCALCEMIA: ICD-10-CM

## 2024-12-09 DIAGNOSIS — Z11.59 NEED FOR HEPATITIS C SCREENING TEST: ICD-10-CM

## 2024-12-09 DIAGNOSIS — D86.9 SARCOIDOSIS: ICD-10-CM

## 2024-12-09 DIAGNOSIS — Z11.1 SCREENING FOR TUBERCULOSIS: ICD-10-CM

## 2024-12-09 DIAGNOSIS — N18.31 STAGE 3A CHRONIC KIDNEY DISEASE (HCC): ICD-10-CM

## 2024-12-09 DIAGNOSIS — Z11.59 NEED FOR HEPATITIS B SCREENING TEST: ICD-10-CM

## 2024-12-09 LAB
CA-I BLD-SCNC: 1.59 MMOL/L (ref 1.12–1.32)
FERRITIN SERPL-MCNC: 596 NG/ML (ref 11–307)
HBV CORE AB SER QL: NORMAL
HBV SURFACE AB SER-ACNC: 482 MIU/ML
HBV SURFACE AG SER QL: NORMAL
HCV AB SER QL: NORMAL
IRON SATN MFR SERPL: 30 % (ref 15–50)
IRON SERPL-MCNC: 115 UG/DL (ref 50–212)
TIBC SERPL-MCNC: 381 UG/DL (ref 250–450)
UIBC SERPL-MCNC: 266 UG/DL (ref 155–355)

## 2024-12-09 PROCEDURE — 87340 HEPATITIS B SURFACE AG IA: CPT

## 2024-12-09 PROCEDURE — 86803 HEPATITIS C AB TEST: CPT

## 2024-12-09 PROCEDURE — 86704 HEP B CORE ANTIBODY TOTAL: CPT

## 2024-12-09 PROCEDURE — 82728 ASSAY OF FERRITIN: CPT

## 2024-12-09 PROCEDURE — 82652 VIT D 1 25-DIHYDROXY: CPT

## 2024-12-09 PROCEDURE — 86480 TB TEST CELL IMMUN MEASURE: CPT

## 2024-12-09 PROCEDURE — 83550 IRON BINDING TEST: CPT

## 2024-12-09 PROCEDURE — 83540 ASSAY OF IRON: CPT

## 2024-12-09 PROCEDURE — 82330 ASSAY OF CALCIUM: CPT

## 2024-12-10 ENCOUNTER — TELEPHONE (OUTPATIENT)
Age: 59
End: 2024-12-10

## 2024-12-10 LAB
GAMMA INTERFERON BACKGROUND BLD IA-ACNC: 0.26 IU/ML
M TB IFN-G BLD-IMP: NEGATIVE
M TB IFN-G CD4+ BCKGRND COR BLD-ACNC: 0.18 IU/ML
M TB IFN-G CD4+ BCKGRND COR BLD-ACNC: 0.27 IU/ML
MITOGEN IGNF BCKGRD COR BLD-ACNC: 0.95 IU/ML

## 2024-12-10 NOTE — TELEPHONE ENCOUNTER
Spoke with patient's  did let him know about the appropriate medication dosing for 1 month and repeating the labs, he did have concerns about if their insurance would cover the same labs. I did let Mateo know to contact his insurance company. As well we scheduled for early 02/2025. As well he stated that his wife will start the correct medication dosing tomorrow and if any early appointment in January to call him.

## 2024-12-10 NOTE — TELEPHONE ENCOUNTER
Patients GI provider:  Dr. MERCHANT    Number to return call: [phone]    Reason for call: Pts ex , on communication consent, contacted office questioning if appt for 12/17 is needed or if it should be rescheduled. Patient has been being treated for low calcium, and is currently having issues getting it to a normal level. Patient was prescribed medication, but wasn't taking it correctly in which recent labs still indicated it is still low.     Please advise and contact patient.

## 2024-12-10 NOTE — TELEPHONE ENCOUNTER
Mateo called on behalf of patient. Danielle was prescribed Methotrexate 2.5 mg tablets on 11/26 and was directed to take (6 tablets by mouth once a week). Then to complete new lab work and follow up with  in 2 weeks to discuss the results and changes. The issue is Danielle has been following the wrong directions and only taking (1 tablet by mouth once a week) of the Methotrexate. She completed her lab work yesterday but now the results are not accurate. She is also scheduled for her 2 week follow up on 12/12/24 with . Mateo is asking to have a return call to discuss what Danielle should do about her medication, when can she complete new blood work that will be approved by her insurance and when can she be rescheduled for her 2 week follow up. Please advise.

## 2024-12-11 ENCOUNTER — HOSPITAL ENCOUNTER (OUTPATIENT)
Dept: INFUSION CENTER | Facility: CLINIC | Age: 59
Discharge: HOME | End: 2024-12-11
Payer: COMMERCIAL

## 2024-12-11 DIAGNOSIS — Z95.828 PORT-A-CATH IN PLACE: Primary | ICD-10-CM

## 2024-12-11 LAB — 1,25(OH)2D SERPL-MCNC: 75.4 PG/ML (ref 5–200)

## 2024-12-11 PROCEDURE — 96523 IRRIG DRUG DELIVERY DEVICE: CPT

## 2024-12-11 NOTE — PROGRESS NOTES
Received for port flush. No complaints offered. RPAC accessed without issue. Brisk blood return noted, flushes easily. Pt to return 1/22 at 10am. AVS declined.

## 2024-12-12 NOTE — TELEPHONE ENCOUNTER
Discussed with patient's  Dr. Canseco's recommendations, rescheduled appt to 2/11/25, after patient sees Rheumatology on 2/4/25.

## 2024-12-13 ENCOUNTER — NURSE TRIAGE (OUTPATIENT)
Age: 59
End: 2024-12-13

## 2024-12-13 NOTE — TELEPHONE ENCOUNTER
"Reason for Disposition   MODERATE-SEVERE rectal pain (i.e., interferes with school, work, or sleep)    Answer Assessment - Initial Assessment Questions  1. SYMPTOM:  \"What's the main symptom you're concerned about?\" (e.g., pain, itching, swelling, rash)      Rectal pain ,patient has a colostomy bag   2. ONSET: \"When did the  start?\"      Last week  3. RECTAL PAIN: \"Do you have any pain around your rectum?\" \"How bad is the pain?\"  (Scale 0-10; or none, mild, moderate, severe)      10  4. RECTAL ITCHING: \"Do you have any itching in this area?\" \"How bad is the itching?\"  (Scale 0-10; or none, mild, moderate, severe)      No   5. CONSTIPATION: \"Do you have constipation?\" If Yes, ask: \"How often do you have a bowel movement (BM)?\"  (Normal range: 3 times a day to every 3 days)  \"When was your last BM?\"        Colostomy bag   6. CAUSE: \"What do you think is causing the anus symptoms?\"      Unknown   7. OTHER SYMPTOMS: \"Do you have any other symptoms?\"  (e.g., abdomen pain, fever, rectal bleeding, vomiting)      Patient denies  abdomen pain, fever, rectal bleeding, vomiting)  Patient called with c/o rectal pain,patient has a colostomy bag.Patient denies rectal itching,fever,rectal bleeding ,N/V.  Offered patient an earlier appt,patient declined only wanted to be seen by a female provider.Declined uc or er evaluation.  Appt scheduled for Monday 12/16  Care advise given to patient.    Protocols used: Rectal Symptoms-Adult-[place]    "

## 2024-12-13 NOTE — TELEPHONE ENCOUNTER
Pt called to report severe intermittent rectal pain for past couple of days. Feels area is inflamed and feels burning. Pt could be heard crying. States she woke up from pain. Has been using Vaseline. Discussed OTC creams and ED precautions given. Pt verbalized understanding.    Pt requesting sooner CRS follow up. Soonest appt 1/14/24; placed on wait list. Pt to also contact PCP.

## 2024-12-16 ENCOUNTER — OFFICE VISIT (OUTPATIENT)
Dept: INTERNAL MEDICINE CLINIC | Facility: CLINIC | Age: 59
End: 2024-12-16
Payer: COMMERCIAL

## 2024-12-16 VITALS
BODY MASS INDEX: 20.39 KG/M2 | SYSTOLIC BLOOD PRESSURE: 98 MMHG | WEIGHT: 110.8 LBS | DIASTOLIC BLOOD PRESSURE: 62 MMHG | HEIGHT: 62 IN

## 2024-12-16 DIAGNOSIS — K64.8 HEMORRHOID PROLAPSE: Primary | ICD-10-CM

## 2024-12-16 PROBLEM — N30.01 ACUTE CYSTITIS WITH HEMATURIA: Status: RESOLVED | Noted: 2024-11-15 | Resolved: 2024-12-16

## 2024-12-16 PROCEDURE — 99213 OFFICE O/P EST LOW 20 MIN: CPT | Performed by: GENERAL ACUTE CARE HOSPITAL

## 2024-12-16 NOTE — TELEPHONE ENCOUNTER
The patient has a history of ypT2N0 rectal cancer. She is status post robotic low anterior resection, low pelvic anastomosis, colorectal anastomosis, stapled, left-sided loop colostomy reversal, diverting Loop ileostomy on 8/17/2023.     She was last seen in the office on 7/30/24. At that time she reported intermittent rectal pain but stated she had not experienced it for the past few months.    Called patient to ask additional questions about her symptoms. Her ex  Mateo picked up the phone. He is on the patient communication form. He states that they have a shared phone and that he is not in the same location as the patient at this time. He reports that the patient has been experiencing pain for around the last week.   She also informed him that something is possibly protruding from her anus. They presume that it is a hemorrhoid and she has been using Preparation H cream and medicated hemorrhoid topical pads. He notes that she is going to see her PCP today regarding this.     Per office protocols, advised that pt stop using hemorrhoid pads, preparation H, and other OTC medicated hemorrhoid treatments. Explained that prolonged use of steroid creams can cause thinning of the skin. Informed the patient that alcohol-based products can exacerbate irritation. Recommended a barrier ointment like Desitin, Aquaphor, and Vaseline. Calmoseptine can be used for irritation. Also recommend sitz baths or warm tub soaks. If she is in severe pain, her symptoms worsen, or she experiencing uncontrolled bleeding she should present to the ED.

## 2024-12-16 NOTE — ASSESSMENT & PLAN NOTE
There is hemorrhoid prolapse on exam.   We reviewed colorectal surgery message regarding hemorrhoid management.   Ok to continue topical phenylephrine she is using.

## 2024-12-16 NOTE — PROGRESS NOTES
"Name: Danielle Haque      : 1965      MRN: [id number]  Encounter Provider: Leena Anaya MD  Encounter Date: 2024   Encounter department: [facility]  :  Assessment & Plan  Hemorrhoid prolapse  There is hemorrhoid prolapse on exam.   We reviewed colorectal surgery message regarding hemorrhoid management.   Ok to continue topical phenylephrine she is using.                   History of Present Illness     HPI  Rectal pain started a week ago    Review of Systems    Objective   BP 98/62 (BP Location: Left arm, Patient Position: Sitting, Cuff Size: Standard)   Ht 5' 2\" (1.575 m)   Wt 50.3 kg (110 lb 12.8 oz)   BMI 20.27 kg/m²      Physical Exam    "

## 2024-12-19 ENCOUNTER — APPOINTMENT (EMERGENCY)
Dept: CT IMAGING | Facility: HOSPITAL | Age: 59
End: 2024-12-19
Payer: COMMERCIAL

## 2024-12-19 ENCOUNTER — HOSPITAL ENCOUNTER (INPATIENT)
Facility: HOSPITAL | Age: 59
LOS: 3 days | Discharge: HOME | End: 2024-12-23
Attending: EMERGENCY MEDICINE | Admitting: INTERNAL MEDICINE
Payer: COMMERCIAL

## 2024-12-19 DIAGNOSIS — N13.30 HYDRONEPHROSIS: ICD-10-CM

## 2024-12-19 DIAGNOSIS — N20.1 URETEROLITHIASIS: ICD-10-CM

## 2024-12-19 DIAGNOSIS — K64.4 EXTERNAL HEMORRHOID: ICD-10-CM

## 2024-12-19 DIAGNOSIS — N39.0 URINARY TRACT INFECTION: Primary | ICD-10-CM

## 2024-12-19 DIAGNOSIS — N13.30 BILATERAL HYDRONEPHROSIS: Chronic | ICD-10-CM

## 2024-12-19 DIAGNOSIS — Z96.0 URETERAL STENT PRESENT: ICD-10-CM

## 2024-12-19 DIAGNOSIS — C18.7 MALIGNANT NEOPLASM OF SIGMOID COLON (HCC): ICD-10-CM

## 2024-12-19 LAB
ALBUMIN SERPL BCG-MCNC: 4 G/DL (ref 3.5–5)
ALP SERPL-CCNC: 142 U/L (ref 34–104)
ALT SERPL W P-5'-P-CCNC: 85 U/L (ref 7–52)
ANION GAP SERPL CALCULATED.3IONS-SCNC: 4 MMOL/L (ref 4–13)
AST SERPL W P-5'-P-CCNC: 65 U/L (ref 13–39)
BACTERIA UR QL AUTO: ABNORMAL /HPF
BASOPHILS # BLD AUTO: 0.01 THOUSANDS/ÂΜL (ref 0–0.1)
BASOPHILS NFR BLD AUTO: 0 % (ref 0–1)
BILIRUB SERPL-MCNC: 0.47 MG/DL (ref 0.2–1)
BILIRUB UR QL STRIP: NEGATIVE
BUDDING YEAST: PRESENT
BUN SERPL-MCNC: 37 MG/DL (ref 5–25)
CALCIUM SERPL-MCNC: 11 MG/DL (ref 8.4–10.2)
CHLORIDE SERPL-SCNC: 114 MMOL/L (ref 96–108)
CLARITY UR: ABNORMAL
CO2 SERPL-SCNC: 21 MMOL/L (ref 21–32)
COLOR UR: ABNORMAL
CREAT SERPL-MCNC: 1.06 MG/DL (ref 0.6–1.3)
EOSINOPHIL # BLD AUTO: 0.29 THOUSAND/ÂΜL (ref 0–0.61)
EOSINOPHIL NFR BLD AUTO: 7 % (ref 0–6)
ERYTHROCYTE [DISTWIDTH] IN BLOOD BY AUTOMATED COUNT: 14.6 % (ref 11.6–15.1)
GFR SERPL CREATININE-BSD FRML MDRD: 58 ML/MIN/1.73SQ M
GLUCOSE SERPL-MCNC: 87 MG/DL (ref 65–140)
GLUCOSE UR STRIP-MCNC: NEGATIVE MG/DL
HCT VFR BLD AUTO: 28.7 % (ref 34.8–46.1)
HGB BLD-MCNC: 9.2 G/DL (ref 11.5–15.4)
HGB UR QL STRIP.AUTO: ABNORMAL
IMM GRANULOCYTES # BLD AUTO: 0.01 THOUSAND/UL (ref 0–0.2)
IMM GRANULOCYTES NFR BLD AUTO: 0 % (ref 0–2)
KETONES UR STRIP-MCNC: NEGATIVE MG/DL
LEUKOCYTE ESTERASE UR QL STRIP: ABNORMAL
LIPASE SERPL-CCNC: 39 U/L (ref 11–82)
LYMPHOCYTES # BLD AUTO: 0.3 THOUSANDS/ÂΜL (ref 0.6–4.47)
LYMPHOCYTES NFR BLD AUTO: 7 % (ref 14–44)
MCH RBC QN AUTO: 29.9 PG (ref 26.8–34.3)
MCHC RBC AUTO-ENTMCNC: 32.1 G/DL (ref 31.4–37.4)
MCV RBC AUTO: 93 FL (ref 82–98)
MONOCYTES # BLD AUTO: 0.21 THOUSAND/ÂΜL (ref 0.17–1.22)
MONOCYTES NFR BLD AUTO: 5 % (ref 4–12)
MUCOUS THREADS UR QL AUTO: ABNORMAL
NEUTROPHILS # BLD AUTO: 3.33 THOUSANDS/ÂΜL (ref 1.85–7.62)
NEUTS SEG NFR BLD AUTO: 81 % (ref 43–75)
NITRITE UR QL STRIP: NEGATIVE
NON-SQ EPI CELLS URNS QL MICRO: ABNORMAL /HPF
NRBC BLD AUTO-RTO: 0 /100 WBCS
PH UR STRIP.AUTO: 5.5 [PH]
PLATELET # BLD AUTO: 205 THOUSANDS/UL (ref 149–390)
PMV BLD AUTO: 9.3 FL (ref 8.9–12.7)
POTASSIUM SERPL-SCNC: 5.4 MMOL/L (ref 3.5–5.3)
PROT SERPL-MCNC: 7 G/DL (ref 6.4–8.4)
PROT UR STRIP-MCNC: ABNORMAL MG/DL
RBC # BLD AUTO: 3.08 MILLION/UL (ref 3.81–5.12)
RBC #/AREA URNS AUTO: ABNORMAL /HPF
SODIUM SERPL-SCNC: 139 MMOL/L (ref 135–147)
SP GR UR STRIP.AUTO: 1.02 (ref 1–1.03)
UROBILINOGEN UR STRIP-ACNC: <2 MG/DL
WBC # BLD AUTO: 4.15 THOUSAND/UL (ref 4.31–10.16)
WBC #/AREA URNS AUTO: ABNORMAL /HPF
WBC CLUMPS # UR AUTO: PRESENT /UL

## 2024-12-19 PROCEDURE — 99284 EMERGENCY DEPT VISIT MOD MDM: CPT

## 2024-12-19 PROCEDURE — 83690 ASSAY OF LIPASE: CPT | Performed by: EMERGENCY MEDICINE

## 2024-12-19 PROCEDURE — 85025 COMPLETE CBC W/AUTO DIFF WBC: CPT | Performed by: EMERGENCY MEDICINE

## 2024-12-19 PROCEDURE — 87086 URINE CULTURE/COLONY COUNT: CPT | Performed by: EMERGENCY MEDICINE

## 2024-12-19 PROCEDURE — 81001 URINALYSIS AUTO W/SCOPE: CPT | Performed by: EMERGENCY MEDICINE

## 2024-12-19 PROCEDURE — 80053 COMPREHEN METABOLIC PANEL: CPT | Performed by: EMERGENCY MEDICINE

## 2024-12-19 PROCEDURE — 36415 COLL VENOUS BLD VENIPUNCTURE: CPT | Performed by: EMERGENCY MEDICINE

## 2024-12-19 PROCEDURE — 99285 EMERGENCY DEPT VISIT HI MDM: CPT

## 2024-12-19 PROCEDURE — 87106 FUNGI IDENTIFICATION YEAST: CPT | Performed by: EMERGENCY MEDICINE

## 2024-12-19 PROCEDURE — 96375 TX/PRO/DX INJ NEW DRUG ADDON: CPT

## 2024-12-19 RX ORDER — LIDOCAINE HYDROCHLORIDE 20 MG/ML
1 JELLY TOPICAL ONCE
Status: COMPLETED | OUTPATIENT
Start: 2024-12-19 | End: 2024-12-19

## 2024-12-19 RX ORDER — HYDROMORPHONE HCL/PF 1 MG/ML
0.5 SYRINGE (ML) INJECTION ONCE
Refills: 0 | Status: COMPLETED | OUTPATIENT
Start: 2024-12-19 | End: 2024-12-19

## 2024-12-19 RX ADMIN — LIDOCAINE HYDROCHLORIDE 1 APPLICATION: 20 JELLY TOPICAL at 23:52

## 2024-12-19 RX ADMIN — HYDROMORPHONE HYDROCHLORIDE 0.5 MG: 1 INJECTION, SOLUTION INTRAMUSCULAR; INTRAVENOUS; SUBCUTANEOUS at 22:23

## 2024-12-20 ENCOUNTER — APPOINTMENT (EMERGENCY)
Dept: CT IMAGING | Facility: HOSPITAL | Age: 59
End: 2024-12-20
Payer: COMMERCIAL

## 2024-12-20 PROBLEM — E87.5 HYPERKALEMIA: Status: ACTIVE | Noted: 2024-12-20

## 2024-12-20 LAB
ANION GAP SERPL CALCULATED.3IONS-SCNC: 9 MMOL/L (ref 4–13)
BASOPHILS # BLD AUTO: 0 THOUSANDS/ÂΜL (ref 0–0.1)
BASOPHILS NFR BLD AUTO: 0 % (ref 0–1)
BUN SERPL-MCNC: 33 MG/DL (ref 5–25)
CALCIUM SERPL-MCNC: 10.2 MG/DL (ref 8.4–10.2)
CHLORIDE SERPL-SCNC: 110 MMOL/L (ref 96–108)
CO2 SERPL-SCNC: 20 MMOL/L (ref 21–32)
CREAT SERPL-MCNC: 1.08 MG/DL (ref 0.6–1.3)
EOSINOPHIL # BLD AUTO: 0.21 THOUSAND/ÂΜL (ref 0–0.61)
EOSINOPHIL NFR BLD AUTO: 6 % (ref 0–6)
ERYTHROCYTE [DISTWIDTH] IN BLOOD BY AUTOMATED COUNT: 14.6 % (ref 11.6–15.1)
GFR SERPL CREATININE-BSD FRML MDRD: 57 ML/MIN/1.73SQ M
GLUCOSE SERPL-MCNC: 164 MG/DL (ref 65–140)
HCT VFR BLD AUTO: 26.5 % (ref 34.8–46.1)
HGB BLD-MCNC: 8.5 G/DL (ref 11.5–15.4)
IMM GRANULOCYTES # BLD AUTO: 0.02 THOUSAND/UL (ref 0–0.2)
IMM GRANULOCYTES NFR BLD AUTO: 1 % (ref 0–2)
LYMPHOCYTES # BLD AUTO: 0.22 THOUSANDS/ÂΜL (ref 0.6–4.47)
LYMPHOCYTES NFR BLD AUTO: 6 % (ref 14–44)
MAGNESIUM SERPL-MCNC: 1.7 MG/DL (ref 1.9–2.7)
MCH RBC QN AUTO: 30.5 PG (ref 26.8–34.3)
MCHC RBC AUTO-ENTMCNC: 32.1 G/DL (ref 31.4–37.4)
MCV RBC AUTO: 95 FL (ref 82–98)
MONOCYTES # BLD AUTO: 0.15 THOUSAND/ÂΜL (ref 0.17–1.22)
MONOCYTES NFR BLD AUTO: 4 % (ref 4–12)
NEUTROPHILS # BLD AUTO: 2.83 THOUSANDS/ÂΜL (ref 1.85–7.62)
NEUTS SEG NFR BLD AUTO: 83 % (ref 43–75)
NRBC BLD AUTO-RTO: 0 /100 WBCS
PLATELET # BLD AUTO: 187 THOUSANDS/UL (ref 149–390)
PLATELET # BLD AUTO: 191 THOUSANDS/UL (ref 149–390)
PMV BLD AUTO: 9.3 FL (ref 8.9–12.7)
PMV BLD AUTO: 9.5 FL (ref 8.9–12.7)
POTASSIUM SERPL-SCNC: 3.6 MMOL/L (ref 3.5–5.3)
POTASSIUM SERPL-SCNC: 4.2 MMOL/L (ref 3.5–5.3)
RBC # BLD AUTO: 2.79 MILLION/UL (ref 3.81–5.12)
SODIUM SERPL-SCNC: 139 MMOL/L (ref 135–147)
WBC # BLD AUTO: 3.43 THOUSAND/UL (ref 4.31–10.16)

## 2024-12-20 PROCEDURE — 84132 ASSAY OF SERUM POTASSIUM: CPT

## 2024-12-20 PROCEDURE — 83735 ASSAY OF MAGNESIUM: CPT

## 2024-12-20 PROCEDURE — 85049 AUTOMATED PLATELET COUNT: CPT

## 2024-12-20 PROCEDURE — 85025 COMPLETE CBC W/AUTO DIFF WBC: CPT

## 2024-12-20 PROCEDURE — 99222 1ST HOSP IP/OBS MODERATE 55: CPT | Performed by: INTERNAL MEDICINE

## 2024-12-20 PROCEDURE — 36415 COLL VENOUS BLD VENIPUNCTURE: CPT

## 2024-12-20 PROCEDURE — 96366 THER/PROPH/DIAG IV INF ADDON: CPT

## 2024-12-20 PROCEDURE — 96365 THER/PROPH/DIAG IV INF INIT: CPT

## 2024-12-20 PROCEDURE — 74177 CT ABD & PELVIS W/CONTRAST: CPT

## 2024-12-20 PROCEDURE — 80048 BASIC METABOLIC PNL TOTAL CA: CPT

## 2024-12-20 RX ORDER — FENOFIBRATE 145 MG/1
145 TABLET, COATED ORAL DAILY
Status: DISCONTINUED | OUTPATIENT
Start: 2024-12-20 | End: 2024-12-23 | Stop reason: HOSPADM

## 2024-12-20 RX ORDER — URSODIOL 300 MG/1
900 CAPSULE ORAL DAILY
Status: DISCONTINUED | OUTPATIENT
Start: 2024-12-20 | End: 2024-12-23 | Stop reason: HOSPADM

## 2024-12-20 RX ORDER — ACETAMINOPHEN 325 MG/1
650 TABLET ORAL EVERY 4 HOURS PRN
Status: DISCONTINUED | OUTPATIENT
Start: 2024-12-20 | End: 2024-12-23 | Stop reason: HOSPADM

## 2024-12-20 RX ORDER — FERROUS SULFATE 325(65) MG
325 TABLET ORAL
Status: DISCONTINUED | OUTPATIENT
Start: 2024-12-20 | End: 2024-12-23 | Stop reason: HOSPADM

## 2024-12-20 RX ORDER — ACETAMINOPHEN 325 MG/1
650 TABLET ORAL EVERY 4 HOURS PRN
Status: DISCONTINUED | OUTPATIENT
Start: 2024-12-20 | End: 2024-12-20

## 2024-12-20 RX ORDER — LISINOPRIL 5 MG/1
5 TABLET ORAL DAILY
Status: DISCONTINUED | OUTPATIENT
Start: 2024-12-20 | End: 2024-12-23 | Stop reason: HOSPADM

## 2024-12-20 RX ORDER — LIDOCAINE HYDROCHLORIDE 20 MG/ML
JELLY TOPICAL ONCE
Status: CANCELLED | OUTPATIENT
Start: 2024-12-20 | End: 2024-12-20

## 2024-12-20 RX ORDER — HYDROMORPHONE HCL IN WATER/PF 6 MG/30 ML
0.2 PATIENT CONTROLLED ANALGESIA SYRINGE INTRAVENOUS EVERY 2 HOUR PRN
Refills: 0 | Status: DISCONTINUED | OUTPATIENT
Start: 2024-12-20 | End: 2024-12-23 | Stop reason: HOSPADM

## 2024-12-20 RX ORDER — ONDANSETRON 4 MG/1
4 TABLET, ORALLY DISINTEGRATING ORAL EVERY 6 HOURS PRN
Status: DISCONTINUED | OUTPATIENT
Start: 2024-12-20 | End: 2024-12-23 | Stop reason: HOSPADM

## 2024-12-20 RX ORDER — MAGNESIUM SULFATE HEPTAHYDRATE 40 MG/ML
4 INJECTION, SOLUTION INTRAVENOUS ONCE
Status: COMPLETED | OUTPATIENT
Start: 2024-12-20 | End: 2024-12-20

## 2024-12-20 RX ORDER — OXYBUTYNIN CHLORIDE 5 MG/1
5 TABLET ORAL EVERY 6 HOURS PRN
Status: DISCONTINUED | OUTPATIENT
Start: 2024-12-20 | End: 2024-12-23 | Stop reason: HOSPADM

## 2024-12-20 RX ORDER — HEPARIN SODIUM 5000 [USP'U]/ML
5000 INJECTION, SOLUTION INTRAVENOUS; SUBCUTANEOUS EVERY 8 HOURS SCHEDULED
Status: DISCONTINUED | OUTPATIENT
Start: 2024-12-20 | End: 2024-12-23 | Stop reason: HOSPADM

## 2024-12-20 RX ORDER — ENOXAPARIN SODIUM 100 MG/ML
40 INJECTION SUBCUTANEOUS DAILY
Status: DISCONTINUED | OUTPATIENT
Start: 2024-12-20 | End: 2024-12-20

## 2024-12-20 RX ORDER — OXYCODONE HYDROCHLORIDE 5 MG/1
5 TABLET ORAL EVERY 4 HOURS PRN
Refills: 0 | Status: DISCONTINUED | OUTPATIENT
Start: 2024-12-20 | End: 2024-12-23 | Stop reason: HOSPADM

## 2024-12-20 RX ORDER — TAMSULOSIN HYDROCHLORIDE 0.4 MG/1
0.4 CAPSULE ORAL
Status: DISCONTINUED | OUTPATIENT
Start: 2024-12-20 | End: 2024-12-23 | Stop reason: HOSPADM

## 2024-12-20 RX ORDER — FOLIC ACID 1 MG/1
1 TABLET ORAL DAILY
Status: DISCONTINUED | OUTPATIENT
Start: 2024-12-20 | End: 2024-12-23 | Stop reason: HOSPADM

## 2024-12-20 RX ORDER — HYDROCORTISONE 25 MG/G
CREAM TOPICAL 2 TIMES DAILY
Status: DISCONTINUED | OUTPATIENT
Start: 2024-12-20 | End: 2024-12-23 | Stop reason: HOSPADM

## 2024-12-20 RX ADMIN — FENOFIBRATE 145 MG: 145 TABLET ORAL at 10:02

## 2024-12-20 RX ADMIN — HEPARIN SODIUM 5000 UNITS: 5000 INJECTION INTRAVENOUS; SUBCUTANEOUS at 06:26

## 2024-12-20 RX ADMIN — OXYCODONE HYDROCHLORIDE 5 MG: 5 TABLET ORAL at 10:00

## 2024-12-20 RX ADMIN — HYDROCORTISONE: 25 CREAM TOPICAL at 13:24

## 2024-12-20 RX ADMIN — MAGNESIUM SULFATE HEPTAHYDRATE 4 G: 40 INJECTION, SOLUTION INTRAVENOUS at 10:08

## 2024-12-20 RX ADMIN — HEPARIN SODIUM 5000 UNITS: 5000 INJECTION INTRAVENOUS; SUBCUTANEOUS at 21:01

## 2024-12-20 RX ADMIN — HYDROMORPHONE HYDROCHLORIDE 0.2 MG: 0.2 INJECTION, SOLUTION INTRAMUSCULAR; INTRAVENOUS; SUBCUTANEOUS at 05:47

## 2024-12-20 RX ADMIN — HYDROCORTISONE: 25 CREAM TOPICAL at 17:24

## 2024-12-20 RX ADMIN — OXYCODONE HYDROCHLORIDE 5 MG: 5 TABLET ORAL at 20:57

## 2024-12-20 RX ADMIN — FERROUS SULFATE TAB 325 MG (65 MG ELEMENTAL FE) 325 MG: 325 (65 FE) TAB at 08:56

## 2024-12-20 RX ADMIN — CEFTRIAXONE 1000 MG: 10 INJECTION, POWDER, FOR SOLUTION INTRAVENOUS at 00:34

## 2024-12-20 RX ADMIN — FOLIC ACID 1 MG: 1 TABLET ORAL at 09:59

## 2024-12-20 RX ADMIN — TAMSULOSIN HYDROCHLORIDE 0.4 MG: 0.4 CAPSULE ORAL at 17:26

## 2024-12-20 RX ADMIN — URSODIOL 900 MG: 300 CAPSULE ORAL at 10:00

## 2024-12-20 RX ADMIN — CYANOCOBALAMIN TAB 500 MCG 1000 MCG: 500 TAB at 10:02

## 2024-12-20 RX ADMIN — IOHEXOL 85 ML: 350 INJECTION, SOLUTION INTRAVENOUS at 01:10

## 2024-12-20 NOTE — ASSESSMENT & PLAN NOTE
Lung and hepatic involvement, elevated ACE level, NC granulomas on pleural biopsy and liver biopsy   Associated hypercalcemia  Off steroids in preparation for ileostomy reversal  Currently on methotrexate 2.5 mg and lisinopril 5 mg    Plan  Continue PTA methotrexate and lisinopril

## 2024-12-20 NOTE — ED NOTES
Patient declining to have IV placed and lab drawn at this time as she would like to inquire w/ provider to have her port accessed d/t difficult peripheral vasculature.      Akua Roche RN  12/19/24 2019

## 2024-12-20 NOTE — ASSESSMENT & PLAN NOTE
Lab Results   Component Value Date    CALCIUM 11.0 (H) 12/19/2024    CALCIUM 11.1 (H) 12/09/2024      Likely secondary to sarcoidosis  Nephrolithiasis secondary to hypercalcemia  Better controlled with prednisone but currently off prednisone for ileostomy reversal  Follows nephrology outpatient  CT AP: Multiple calculi present in bilateral kidneys adjacent to ureteral stents.

## 2024-12-20 NOTE — ASSESSMENT & PLAN NOTE
Lab Results   Component Value Date    AST 65 (H) 12/19/2024    AST 45 (H) 12/09/2024    ALT 85 (H) 12/19/2024    ALT 62 (H) 12/09/2024    ALKPHOS 142 (H) 12/19/2024    ALKPHOS 125 (H) 12/09/2024      Hepatic involvement of sarcoidosis  Follows up with gastroenterology  PTA fenofibrate 145 mg and ursodiol 300 mg 3 times daily    Plan  Continue PTA fenofibrate and ursodiol

## 2024-12-20 NOTE — ASSESSMENT & PLAN NOTE
Bilateral ureteral stricture disease, Maintained with bilateral ureteral stents, last exchanged 10/18/24  CT AP: Bilateral nephrolithiasis. Bilateral ureteral stents in place. There is bilateral mild hydronephrosis. Bilateral periureteral fat stranding, correlate with urinalysis for infection.  Urology recommended no current interventions as long as bilateral stents are draining urine

## 2024-12-20 NOTE — ED PROVIDER NOTES
Time reflects when diagnosis was documented in both MDM as applicable and the Disposition within this note       Time User Action Codes Description Comment    12/20/2024  3:03 AM Courtney French Add [N39.0] Urinary tract infection     12/20/2024  3:04 AM Courtney French Add [Z96.0] Ureteral stent present     12/20/2024  3:04 AM Courtney French Add [N20.1] Ureterolithiasis     12/20/2024  3:04 AM Courtney French Add [N13.30] Hydronephrosis     12/20/2024  3:04 AM Courtney French Add [K64.4] External hemorrhoid           ED Disposition       ED Disposition   Admit    Condition   Stable    Date/Time   Fri Dec 20, 2024  3:03 AM    Comment   Case was discussed with Dr. Bernal and the patient's admission status was agreed to be Admission Status: inpatient status to the service of Dr. Long.               Assessment & Plan       Medical Decision Making  Patient seen, examined and noted to have urinary tract infection, ureteral stents present, ureterolithiasis, hydronephrosis and external hemorrhoid.  Patient coming in stating it feels like when her stents have been walked previously.  Also coming in complaining of a hemorrhoid.  Patient has a colostomy and does not pass any stool per rectum.  Labs today reveal innumerable white blood cells, innumerable red blood cells and WBC clumps.  CT scan of patient's abdomen pelvis showed bilateral nephrolithiasis bilateral ureteral stents bilateral ureteral stones adjacent to the stents and lateral periureteral fat stranding.  Patient was given a dose of Rocephin here in the department and admitted to internal medicine for further management and care.    Differential diagnosis includes but is not limited to pelvic pathology, renal colic, pyelonephritis, UTI, internal hemorrhoid, external hemorrhoid, thrombosed external hemorrhoid, fissure    Patient's labs notable for: Above    Imaging revealed:   Mentioned above     Discussed patient's case with Dr. Bernal ([facility]) regarding  admission who accepted the patient for further evaluation and management.    All labs reviewed and utilized in the medical decision making process.    Amount and/or Complexity of Data Reviewed  Labs: ordered.  Radiology: ordered.    Risk  Prescription drug management.  Decision regarding hospitalization.        ED Course as of 12/20/24 0340   Thu Dec 19, 2024   2221 Patient has a port - would like port to be access instead of being stuck   2332 Large external hemorrhoid on my exam   Fri Dec 20, 2024   0044 Patient is in line for CT scan, awaiting scan for further management, reevaluated patient and she is comfortable at this time   0155 Reaching out to urology now   0248 Waiting to hear back from [facility]       Medications   acetaminophen (TYLENOL) tablet 650 mg (has no administration in time range)   cyanocobalamin (VITAMIN B-12) tablet 1,000 mcg (has no administration in time range)   fenofibrate (TRICOR) tablet 145 mg (has no administration in time range)   ferrous sulfate tablet 325 mg (has no administration in time range)   folic acid (FOLVITE) tablet 1 mg (has no administration in time range)   lisinopril (ZESTRIL) tablet 5 mg (has no administration in time range)   ondansetron (ZOFRAN-ODT) dispersible tablet 4 mg (has no administration in time range)   oxybutynin (DITROPAN) tablet 5 mg (has no administration in time range)   tamsulosin (FLOMAX) capsule 0.4 mg (has no administration in time range)   ursodiol (ACTIGALL) capsule 900 mg (has no administration in time range)   enoxaparin (LOVENOX) subcutaneous injection 40 mg (has no administration in time range)   ceftriaxone (ROCEPHIN) 1 g/50 mL in dextrose IVPB (has no administration in time range)   HYDROmorphone (DILAUDID) injection 0.5 mg (0.5 mg Intravenous Given 12/19/24 2223)   lidocaine (URO-JET) 2 % urethral/mucosal gel 1 Application (1 Application Urethral Given 12/19/24 2352)   ceftriaxone (ROCEPHIN) 1 g/50 mL in dextrose IVPB (0 mg Intravenous Stopped  12/20/24 0314)   iohexol (OMNIPAQUE) 350 MG/ML injection (MULTI-DOSE) 100 mL (85 mL Intravenous Given 12/20/24 0110)       ED Risk Strat Scores                          SBIRT 20yo+      Flowsheet Row Most Recent Value   Initial Alcohol Screen: US AUDIT-C     1. How often do you have a drink containing alcohol? 0 Filed at: 12/19/2024 2002   2. How many drinks containing alcohol do you have on a typical day you are drinking?  0 Filed at: 12/19/2024 2002   3b. FEMALE Any Age, or MALE 65+: How often do you have 4 or more drinks on one occassion? 0 Filed at: 12/19/2024 2002   Audit-C Score 0 Filed at: 12/19/2024 2002   ENRIQUE: How many times in the past year have you...    Used an illegal drug or used a prescription medication for non-medical reasons? Never Filed at: 12/19/2024 2002                            History of Present Illness       Chief Complaint   Patient presents with    Hemorrhoids     Pt has hemorrhoids that she states are getting bigger and more painful. Pt has ileostomy.     Pelvic Pain     Pt also has genital pain, pt has kidney stents, when pain is bad she gets them replaced which helps her pain. Pain with urination, cloudy, bloody, and malodorous. Took oxycodone at 8 with minimal relief.        Past Medical History:   Diagnosis Date    Bleeding from colostomy stoma (HCC) 2/11/2023     states ileostomy , not colostomy    Cancer (HCC)     Colon cancer (HCC)     Elevated serum creatinine 09/11/2024    Fall     Headache     Hemochromatosis, unspecified     History of transfusion     2022 - no adverse reaction    Hypertension     Hypokalemia 09/12/2024    Kidney calculi     Kidney stone     Liver disease     hemangioma    Muscle weakness     Personal history of COVID-19 12/2022    Sarcoidosis     Seizures (HCC)     2022    Shingles       Past Surgical History:   Procedure Laterality Date    ABSCESS DRAINAGE      left breast    BREAST BIOPSY      CHEST TUBE INSERTION      COLON SURGERY      colostomy     COLONOSCOPY      FL GUIDED CENTRAL VENOUS ACCESS DEVICE INSERTION  03/21/2023    FL RETROGRADE PYELOGRAM  01/23/2023    FL RETROGRADE PYELOGRAM  04/03/2023    FL RETROGRADE PYELOGRAM  7/21/2023    FL RETROGRADE PYELOGRAM  10/11/2023    FL RETROGRADE PYELOGRAM  12/15/2023    FL RETROGRADE PYELOGRAM  2/8/2024    FL RETROGRADE PYELOGRAM  5/10/2024    FL RETROGRADE PYELOGRAM  9/26/2024    FL RETROGRADE PYELOGRAM  10/18/2024    IR BIOPSY LIVER MASS  05/09/2023    LUNG BIOPSY      AL CYSTO BLADDER W/URETERAL CATHETERIZATION Bilateral 07/21/2023    Procedure: CYSTOSCOPY URETEROSCOPY RETROGRADE PYELOGRAM WITH BILATERAL STENT URETERAL EXCHANGE; LEFT LASER LITHOTRIPSY AND STONE BASKET RETRIEVAL;  Surgeon: José Luis Stephens MD;  [facility];  Service: Urology    AL CYSTO BLADDER W/URETERAL CATHETERIZATION Bilateral 12/15/2023    Procedure: CYSTOSCOPY, BILATERAL  RETROGRADE PYELOGRAM , STENT EXCHANGE RIGHT , LEFT URETEROSCOPY ,  HOLMIUM LASER WITH INSERTION LEFT URETERAL STENT;  Surgeon: Derick Bhakta MD;  [facility];  Service: Urology    AL CYSTO BLADDER W/URETERAL CATHETERIZATION Left 2/8/2024    Procedure: CYSTOSCOPY B/L RETROGRADE PYELOGRAM WITH B/L T URETERALSTENT EXCHANGE,LEFT URETEROSCOPY, DILATION LEFT URETERAL STICTURE;  Surgeon: José Luis Stephens MD;  [facility];  Service: Urology    AL CYSTO BLADDER W/URETERAL CATHETERIZATION Bilateral 5/10/2024    Procedure: CYSTOSCOPY RETROGRADE PYELOGRAM WITH INSERTION STENT URETERAL;  Surgeon: José Luis Stephens MD;  [facility];  Service: Urology    AL CYSTO W/INSERT URETERAL STENT Bilateral 5/10/2024    Procedure: EXCHANGE STENT URETERAL;  Surgeon: José Luis Stephens MD;  [facility];  Service: Urology    AL CYSTO W/INSERT URETERAL STENT Bilateral 10/18/2024    Procedure: EXCHANGE STENT URETERAL;  Surgeon: José Luis Stephens MD;  [facility];  Service: Urology    AL CYSTO/URETERO  W/LITHOTRIPSY &INDWELL STENT INSRT Bilateral 01/23/2023    Procedure: CYSTOSCOPY  RIGHT URETEROSCOPY WITH LITHOTRIPSY HOLMIUM LASER, BILATERAL RETROGRADE PYELOGRAM AND BILATERAL  URETERAL STENT INSERTION;  Surgeon: José Luis Stephens MD;  [facility];  Service: Urology    FL CYSTO/URETERO W/LITHOTRIPSY &INDWELL STENT INSRT Right 04/03/2023    Procedure: RIGHT URETEROSCOPY WITH LITHOTRIPSY HOLMIUM LASER, RETROGRADE PYELOGRAM; BILATERAL EXCHANGE STENT URETERAL;  Surgeon: José Luis Stephens MD;  [facility];  Service: Urology    FL CYSTO/URETERO W/LITHOTRIPSY &INDWELL STENT INSRT Bilateral 10/11/2023    Procedure: CYSTOSCOPY URETEROSCOPY RETROGRADE PYELOGRAM AND INSERTION STENT URETERAL DIAGNOSTINC RIGHT URETEROSCOPY, REMOVAL STENT LEFT SIDE;  Surgeon: José Luis Stephens MD;  [facility];  Service: Urology    FL CYSTO/URETERO W/LITHOTRIPSY &INDWELL STENT INSRT Bilateral 9/26/2024    Procedure: CYSTOSCOPY ,LEFT  URETEROSCOPY,  BILATERAL  RETROGRADE PYELOGRAM AND BILATERAL URETERAL STENT EXCHANGE;  Surgeon: Favian Barber MD;  [facility];  Service: Urology    FL CYSTO/URETERO W/LITHOTRIPSY &INDWELL STENT INSRT Left 10/18/2024    Procedure: CYSTOSCOPY LEFT URETEROSCOPY, treatment of LEFT ureteral stricture, RETROGRADE PYELOGRAM AND BILATERAL STENT EXCHANGE;  Surgeon: José Luis Stephens MD;  [facility];  Service: Urology    FL INSJ TUNNELED CTR VAD W/SUBQ PORT AGE 5 YR/> N/A 03/21/2023    Procedure: INSERTION VENOUS PORT ( PORT-A-CATH) IR;  Surgeon: Mark Amos DO;  Location: [facility];  Service: Interventional Radiology    FL LAPS COLECTOMY PRTL W/COLOPXTSTMY LW ANAST N/A 8/17/2023    Procedure: RESECTION COLON LOW ANTERIOR LAPAROSCOPIC WITH ROBOTICS;  Surgeon: Sree Umanzor MD;  [facility];  Service: Colorectal    TONSILLECTOMY      URINARY SURGERY Bilateral     bilateral stents      Family History   Problem Relation Age of Onset     Cancer Mother     Cirrhosis Father     Diabetes Father     Breast cancer Neg Hx     Breast cancer additional onset Neg Hx       Social History     Tobacco Use    Smoking status: Never     Passive exposure: Past    Smokeless tobacco: Never   Vaping Use    Vaping status: Never Used   Substance Use Topics    Alcohol use: Never    Drug use: Never      E-Cigarette/Vaping    E-Cigarette Use Never User       E-Cigarette/Vaping Substances    Nicotine No     THC No     CBD No     Flavoring No       I have reviewed and agree with the history as documented.     Danielle Haque is a 59 y.o. female with a PMH of rectal cancer, iron deficiency anemia getting iron infusions, status post colostomy, bilateral ureteral stenting presenting to the ED on December 20, 2024 with hemorrhoids and pelvic pain. Patient endorses that she has had a hemorrhoid for the last 2 to 3 weeks that is getting bigger and more painful.  Patient has been using a hemorrhoid cream on it without improvement.  She has an ileostomy bag and does not have any stool per rectum.  Patient also notes that she has pelvic pain that she notes is very similar to when her bilateral ureteral stents become clogged.  She notes that she is having increased pain with urination, that her urine is cloudier, bloody and more malodorous than normal.  Patient took an oxycodone at 8 with minimal relief of her symptoms.  Patient denies fevers, nausea, vomiting, diarrhea, chest pain, shortness of breath or any other complaints at this time.             Review of Systems   Constitutional:  Negative for appetite change, chills and fever.   Respiratory:  Negative for cough and shortness of breath.    Cardiovascular:  Negative for chest pain and palpitations.   Gastrointestinal:  Positive for abdominal pain. Negative for constipation, diarrhea, nausea and vomiting.   Genitourinary:  Positive for dysuria, hematuria and pelvic pain. Negative for flank pain and frequency.   Musculoskeletal:   Negative for neck pain and neck stiffness.           Objective       ED Triage Vitals [12/19/24 1957]   Temperature Pulse Blood Pressure Respirations SpO2 Patient Position - Orthostatic VS   97.5 °F (36.4 °C) 103 118/80 18 99 % Sitting      Temp Source Heart Rate Source BP Location FiO2 (%) Pain Score    Oral Monitor Right arm -- 8      Vitals      Date and Time Temp Pulse SpO2 Resp BP Pain Score FACES Pain Rating User   12/20/24 0130 -- 96 97 % -- 104/58 -- -- KB   12/20/24 0037 -- -- -- -- -- 5 -- KB   12/19/24 2333 -- 94 95 % -- 129/74 5 -- KB   12/19/24 2223 -- -- -- -- -- 7 -- MD   12/19/24 1957 97.5 °F (36.4 °C) 103 99 % 18 118/80 8 -- AH            Physical Exam  Vitals and nursing note reviewed.   Constitutional:       General: She is in acute distress.      Appearance: Normal appearance. She is normal weight. She is not ill-appearing, toxic-appearing or diaphoretic.      Comments: In acute distress secondary to pain   HENT:      Head: Normocephalic and atraumatic.      Right Ear: External ear normal.      Left Ear: External ear normal.      Nose: Nose normal. No congestion or rhinorrhea.      Mouth/Throat:      Mouth: Mucous membranes are moist.   Eyes:      General: No scleral icterus.        Right eye: No discharge.         Left eye: No discharge.      Extraocular Movements: Extraocular movements intact.      Conjunctiva/sclera: Conjunctivae normal.   Cardiovascular:      Rate and Rhythm: Regular rhythm. Tachycardia present.      Heart sounds: Normal heart sounds. No murmur heard.     No friction rub. No gallop.   Pulmonary:      Effort: Pulmonary effort is normal. No respiratory distress.      Breath sounds: Normal breath sounds. No wheezing, rhonchi or rales.   Abdominal:      General: Abdomen is flat. Bowel sounds are normal. There is no distension.      Tenderness: There is abdominal tenderness. There is no guarding or rebound.      Comments: Suprapubic tenderness noted on my exam   Musculoskeletal:          General: No signs of injury. Normal range of motion.      Cervical back: Normal range of motion and neck supple. No rigidity.   Skin:     General: Skin is warm.      Capillary Refill: Capillary refill takes less than 2 seconds.      Coloration: Skin is not pale.      Findings: No erythema.   Neurological:      General: No focal deficit present.      Mental Status: She is alert and oriented to person, place, and time. Mental status is at baseline.      Motor: No weakness.      Gait: Gait normal.   Psychiatric:         Mood and Affect: Mood normal.         Behavior: Behavior normal.         Results Reviewed       Procedure Component Value Units Date/Time    Potassium [[id number]]     Lab Status: No result Specimen: Blood     Platelet count [[id number]]     Lab Status: No result Specimen: Blood     Comprehensive metabolic panel [[id number]]  (Abnormal) Collected: 12/19/24 2222    Lab Status: Final result Specimen: Blood from Central Venous Line Updated: 12/19/24 2306     Sodium 139 mmol/L      Potassium 5.4 mmol/L      Chloride 114 mmol/L      CO2 21 mmol/L      ANION GAP 4 mmol/L      BUN 37 mg/dL      Creatinine 1.06 mg/dL      Glucose 87 mg/dL      Calcium 11.0 mg/dL      AST 65 U/L      ALT 85 U/L      Alkaline Phosphatase 142 U/L      Total Protein 7.0 g/dL      Albumin 4.0 g/dL      Total Bilirubin 0.47 mg/dL      eGFR 58 ml/min/1.73sq m     Narrative:      [employer] guidelines for Chronic Kidney Disease (CKD):     Stage 1 with normal or high GFR (GFR > 90 mL/min/1.73 square meters)    Stage 2 Mild CKD (GFR = 60-89 mL/min/1.73 square meters)    Stage 3A Moderate CKD (GFR = 45-59 mL/min/1.73 square meters)    Stage 3B Moderate CKD (GFR = 30-44 mL/min/1.73 square meters)    Stage 4 Severe CKD (GFR = 15-29 mL/min/1.73 square meters)    Stage 5 End Stage CKD (GFR <15 mL/min/1.73 square meters)  Note: GFR calculation is accurate only with a steady state creatinine    Lipase [[id number]]   (Normal) Collected: 12/19/24 2222    Lab Status: Final result Specimen: Blood from Central Venous Line Updated: 12/19/24 2255     Lipase 39 u/L     CBC and differential [[id number]]  (Abnormal) Collected: 12/19/24 2222    Lab Status: Final result Specimen: Blood from Central Venous Line Updated: 12/19/24 2238     WBC 4.15 Thousand/uL      RBC 3.08 Million/uL      Hemoglobin 9.2 g/dL      Hematocrit 28.7 %      MCV 93 fL      MCH 29.9 pg      MCHC 32.1 g/dL      RDW 14.6 %      MPV 9.3 fL      Platelets 205 Thousands/uL      nRBC 0 /100 WBCs      Segmented % 81 %      Immature Grans % 0 %      Lymphocytes % 7 %      Monocytes % 5 %      Eosinophils Relative 7 %      Basophils Relative 0 %      Absolute Neutrophils 3.33 Thousands/µL      Absolute Immature Grans 0.01 Thousand/uL      Absolute Lymphocytes 0.30 Thousands/µL      Absolute Monocytes 0.21 Thousand/µL      Eosinophils Absolute 0.29 Thousand/µL      Basophils Absolute 0.01 Thousands/µL     Urine Microscopic [[id number]]  (Abnormal) Collected: 12/19/24 2012    Lab Status: Final result Specimen: Urine, Clean Catch Updated: 12/19/24 2029     RBC, UA Innumerable /hpf      WBC, UA Innumerable /hpf      Epithelial Cells Occasional /hpf      Bacteria, UA None Seen /hpf      MUCUS THREADS Moderate     WBC Clumps Present     Budding Yeast Present    Urine culture [[id number]] Collected: 12/19/24 2012    Lab Status: In process Specimen: Urine, Clean Catch Updated: 12/19/24 2029    UA w Reflex to Microscopic w Reflex to Culture [[id number]]  (Abnormal) Collected: 12/19/24 2012    Lab Status: Final result Specimen: Urine, Clean Catch Updated: 12/19/24 2022     Color, UA Light Orange     Clarity, UA Extra Turbid     Specific Gravity, UA 1.018     pH, UA 5.5     Leukocytes, UA Large     Nitrite, UA Negative     Protein,  (2+) mg/dl      Glucose, UA Negative mg/dl      Ketones, UA Negative mg/dl      Urobilinogen, UA <2.0 mg/dl      Bilirubin, UA Negative     Occult  Blood, UA Large            CT abdomen pelvis with contrast   Final Interpretation by Matheus Silva MD (12/20 0213)      Bilateral nephrolithiasis. Bilateral ureteral stents in place. There is bilateral mild hydronephrosis. Bilateral periureteral fat stranding, correlate with urinalysis for infection.         Workstation performed: [id number]             Procedures    ED Medication and Procedure Management   Prior to Admission Medications   Prescriptions Last Dose Informant Patient Reported? Taking?   acetaminophen (TYLENOL) 325 mg tablet  Self No No   Sig: Take 2 tablets (650 mg total) by mouth every 4 (four) hours as needed for mild pain   cyanocobalamin (VITAMIN B-12) 1000 MCG tablet  Self Yes No   Sig: Take 1,000 mcg by mouth daily   docusate sodium (COLACE) 100 mg capsule  Self No No   Sig: Take 1 capsule (100 mg total) by mouth 2 (two) times a day for 15 days   estrogens, conjugated (Premarin) vaginal cream  Self No No   Sig: Insert 0.3 g into the vagina 3 (three) times a week   fenofibrate (TRICOR) 145 mg tablet  Self No No   Sig: Take 1 tablet (145 mg total) by mouth daily   ferrous sulfate 324 (65 Fe) mg   No No   Sig: TAKE 1 TABLET (324 MG TOTAL) BY MOUTH DAILY BEFORE BREAKFAST   ferrous sulfate 324 (65 Fe) mg   No No   Sig: Take 1 tablet (324 mg total) by mouth daily before breakfast   folic acid (FOLVITE) 1 mg tablet   No No   Sig: Take 1 tablet (1 mg total) by mouth daily   lisinopril (ZESTRIL) 5 mg tablet   No No   Sig: Take 1 tablet (5 mg total) by mouth daily   methotrexate 2.5 MG tablet   No No   Sig: Take 6 tablets (15 mg total) by mouth once a week   ondansetron (ZOFRAN) 4 mg tablet  Self No No   Sig: Take 1 tablet (4 mg total) by mouth every 8 (eight) hours as needed for nausea or vomiting   oxybutynin (DITROPAN) 5 mg tablet  Self No No   Sig: Take 1 tablet (5 mg total) by mouth every 6 (six) hours as needed (bladder spasms)   potassium chloride (MICRO-K) 10 MEQ CR capsule   No No   Sig: TAKE 4  CAPSULES (40 MEQ TOTAL) BY MOUTH 2 (TWO) TIMES A DAY   tamsulosin (FLOMAX) 0.4 mg  Self No No   Sig: Take 1 capsule (0.4 mg total) by mouth daily with dinner   ursodiol (ACTIGALL) 300 mg capsule   No No   Sig: Take 3 capsules (900 mg total) by mouth in the morning      Facility-Administered Medications: None     Patient's Medications   Discharge Prescriptions    No medications on file     No discharge procedures on file.  ED SEPSIS DOCUMENTATION   Time reflects when diagnosis was documented in both MDM as applicable and the Disposition within this note       Time User Action Codes Description Comment    12/20/2024  3:03 AM Courtney French [N39.0] Urinary tract infection     12/20/2024  3:04 AM Courtney French [Z96.0] Ureteral stent present     12/20/2024  3:04 AM Courtney French [N20.1] Ureterolithiasis     12/20/2024  3:04 AM Courtney French [N13.30] Hydronephrosis     12/20/2024  3:04 AM Courtney French [K64.4] External hemorrhoid                  Courtney French PA-C  12/20/24 5385

## 2024-12-20 NOTE — ASSESSMENT & PLAN NOTE
Lab Results   Component Value Date    HGB 9.2 (L) 12/19/2024    HGB 10.1 (L) 12/09/2024    WBC 4.15 (L) 12/19/2024    WBC 4.07 (L) 12/09/2024      Likely secondary to sarcoidosis  Follows up with heme/onc    Plan   Continue to monitor

## 2024-12-20 NOTE — ASSESSMENT & PLAN NOTE
Lab Results   Component Value Date    EGFR 58 12/19/2024    EGFR 49 12/09/2024    EGFR 55 11/16/2024    CREATININE 1.06 12/19/2024    CREATININE 1.22 12/09/2024    CREATININE 1.12 11/16/2024     Baseline creatinine 1.08 mg/dL, fluctuates   Secondary to obstructive uropathy in the setting of nephrolithiasis     Plan   Continue to monitor BMP

## 2024-12-20 NOTE — H&P
H&P - Hospitalist   Name: Danielle Haque 59 y.o. female I MRN: [id number]  Unit/Bed#: ED-34 I Date of Admission: 12/19/2024   Date of Service: 12/20/2024 I Hospital Day: 0     Assessment & Plan  Possible UTI (urinary tract infection)  Patient reports waxing and waning suprapubic and genital pain since ureteral stent replacement in October  Also reports burning urination that is cloudy and dark orange-colored  History of UTI and patient reports symptoms are similar  Reports 2 weeks of rectal pain with history of hemorrhoids  Denies fevers and chills but endorses occasional nausea  UA revealed innumerable WBC and RBC  CT AP: Multiple calculi seen in both kidneys and adjacent to stents. Bilateral ureteral stents in place. There is bilateral mild hydronephrosis. Bilateral periureteral fat stranding, correlate with urinalysis for infection.   Previous urine culture grew lactobacillus and few gram-positive rods likely contaminant, no susceptibility information available  Did not meet SIRS criteria    Plan  Continue ceftriaxone  Follow-up urine cultures  Trend WBC and fever curve  Hydronephrosis due to ureteral stricture  Bilateral ureteral stricture disease, Maintained with bilateral ureteral stents, last exchanged 10/18/24  CT AP: Bilateral nephrolithiasis. Bilateral ureteral stents in place. There is bilateral mild hydronephrosis. Bilateral periureteral fat stranding, correlate with urinalysis for infection.  Urology recommended no current interventions as long as bilateral stents are draining urine    Sarcoidosis  Lung and hepatic involvement, elevated ACE level, NC granulomas on pleural biopsy and liver biopsy   Associated hypercalcemia  Off steroids in preparation for ileostomy reversal  Currently on methotrexate 2.5 mg and lisinopril 5 mg    Plan  Continue PTA methotrexate and lisinopril  Hyperkalemia  Lab Results   Component Value Date    K 5.4 (H) 12/19/2024    K 4.6 12/09/2024     History of hypokalemia and takes  40 mEq twice daily    Plan  Hold potassium supplements  Continue to monitor BMP  Hypercalcemia with nephrolithiasis  Lab Results   Component Value Date    CALCIUM 11.0 (H) 12/19/2024    CALCIUM 11.1 (H) 12/09/2024      Likely secondary to sarcoidosis  Nephrolithiasis secondary to hypercalcemia  Better controlled with prednisone but currently off prednisone for ileostomy reversal  Follows nephrology outpatient  CT AP: Multiple calculi present in bilateral kidneys adjacent to ureteral stents.    Transaminitis  Lab Results   Component Value Date    AST 65 (H) 12/19/2024    AST 45 (H) 12/09/2024    ALT 85 (H) 12/19/2024    ALT 62 (H) 12/09/2024    ALKPHOS 142 (H) 12/19/2024    ALKPHOS 125 (H) 12/09/2024      Hepatic involvement of sarcoidosis  Follows up with gastroenterology  PTA fenofibrate 145 mg and ursodiol 300 mg 3 times daily    Plan  Continue PTA fenofibrate and ursodiol  CKD stage G3a/A2, GFR 45-59 and albumin creatinine ratio  mg/g (HCC)  Lab Results   Component Value Date    EGFR 58 12/19/2024    EGFR 49 12/09/2024    EGFR 55 11/16/2024    CREATININE 1.06 12/19/2024    CREATININE 1.22 12/09/2024    CREATININE 1.12 11/16/2024     Baseline creatinine 1.08 mg/dL, fluctuates   Secondary to obstructive uropathy in the setting of nephrolithiasis     Plan   Continue to monitor BMP  History of rectal cancer (HCC)  Rectosigmoid cancer s/p laparoscopic diverting colostomy and neoadjuvant chemoXRT, initially diagnosed on October 28, 2022. Follows with heme-onc outpatient, per recent visit she does not have clinical, laboratory, or imaging evidence of recurrent or metastatic disease   Pancytopenia (HCC)  Lab Results   Component Value Date    HGB 9.2 (L) 12/19/2024    HGB 10.1 (L) 12/09/2024    WBC 4.15 (L) 12/19/2024    WBC 4.07 (L) 12/09/2024      Likely secondary to sarcoidosis  Follows up with heme/onc    Plan   Continue to monitor           VTE Pharmacologic Prophylaxis: VTE Score: 4 Moderate Risk (Score 3-4) -  Pharmacological DVT Prophylaxis Ordered: heparin.  Code Status: Level 1 - Full Code   Discussion with family:  Day team to update.     Anticipated Length of Stay: Patient will be admitted on an observation basis with an anticipated length of stay of less than 2 midnights secondary to UTI.    History of Present Illness   Chief Complaint: Burning urination with abdominal and genital pain    Danielle Haque is a 59 y.o. female with a PMH of rectal cancer, sarcoidosis with lung and liver involvement, ureteral strictures with bilateral stents and CKD who presents with burning urination with abdominal and genital pain.  She reports waxing and waning abdominal and genital pain since her last stent replacement on 10/18.  She endorses that she experienced these symptoms before in the setting of UTI.  Her burning urination is associated with cloudy and dark orange-colored urine.  She also complains of rectal pain for the past 2 weeks secondary to hemorrhoids. She denies fever and chills but endorses occasional nausea.  On arrival to the ED her UA was positive for innumerable WBC and RBC.  CMP was notable for a potassium of 5.4 and calcium of 11. CT AP revealed multiple calculi and both kidneys as well as adjacent to stents. Bilateral ureteral stents in place. There is bilateral mild hydronephrosis and bilateral periureteral fat stranding.  Urology said no role for intervention as long as stents are draining.  Patient started on ceftriaxone and given Dilaudid for pain.    Review of Systems   Constitutional:  Negative for chills and fever.   HENT:  Negative for ear pain and sore throat.    Eyes:  Negative for pain and visual disturbance.   Respiratory:  Negative for cough and shortness of breath.    Cardiovascular:  Negative for chest pain and palpitations.   Gastrointestinal:  Positive for nausea. Negative for abdominal pain and vomiting.   Genitourinary:  Positive for dysuria and pelvic pain. Negative for flank pain and  hematuria.   Musculoskeletal:  Negative for arthralgias and back pain.   Skin:  Negative for color change and rash.   Neurological:  Negative for seizures and syncope.   All other systems reviewed and are negative.      Historical Information   Past Medical History:   Diagnosis Date    Bleeding from colostomy stoma (HCC) 2/11/2023     states ileostomy , not colostomy    Cancer (HCC)     Colon cancer (HCC)     Elevated serum creatinine 09/11/2024    Fall     Headache     Hemochromatosis, unspecified     History of transfusion     2022 - no adverse reaction    Hypertension     Hypokalemia 09/12/2024    Kidney calculi     Kidney stone     Liver disease     hemangioma    Muscle weakness     Personal history of COVID-19 12/2022    Sarcoidosis     Seizures (HCC)     2022    Shingles      Past Surgical History:   Procedure Laterality Date    ABSCESS DRAINAGE      left breast    BREAST BIOPSY      CHEST TUBE INSERTION      COLON SURGERY      colostomy    COLONOSCOPY      FL GUIDED CENTRAL VENOUS ACCESS DEVICE INSERTION  03/21/2023    FL RETROGRADE PYELOGRAM  01/23/2023    FL RETROGRADE PYELOGRAM  04/03/2023    FL RETROGRADE PYELOGRAM  7/21/2023    FL RETROGRADE PYELOGRAM  10/11/2023    FL RETROGRADE PYELOGRAM  12/15/2023    FL RETROGRADE PYELOGRAM  2/8/2024    FL RETROGRADE PYELOGRAM  5/10/2024    FL RETROGRADE PYELOGRAM  9/26/2024    FL RETROGRADE PYELOGRAM  10/18/2024    IR BIOPSY LIVER MASS  05/09/2023    LUNG BIOPSY      WY CYSTO BLADDER W/URETERAL CATHETERIZATION Bilateral 07/21/2023    Procedure: CYSTOSCOPY URETEROSCOPY RETROGRADE PYELOGRAM WITH BILATERAL STENT URETERAL EXCHANGE; LEFT LASER LITHOTRIPSY AND STONE BASKET RETRIEVAL;  Surgeon: José Luis Stephens MD;  Location: [facility] MAIN OR;  Service: Urology    WY CYSTO BLADDER W/URETERAL CATHETERIZATION Bilateral 12/15/2023    Procedure: CYSTOSCOPY, BILATERAL  RETROGRADE PYELOGRAM , STENT EXCHANGE RIGHT , LEFT URETEROSCOPY ,  HOLMIUM LASER WITH INSERTION LEFT  URETERAL STENT;  Surgeon: Derick Bhakta MD;  Location: [facility] MAIN [facility];  Service: Urology    SC CYSTO BLADDER W/URETERAL CATHETERIZATION Left 2/8/2024    Procedure: CYSTOSCOPY B/L RETROGRADE PYELOGRAM WITH B/L T URETERALSTENT EXCHANGE,LEFT URETEROSCOPY, DILATION LEFT URETERAL STICTURE;  Surgeon: José Luis Stephens MD;  Location: [facility] Main OR;  Service: Urology    SC CYSTO BLADDER W/URETERAL CATHETERIZATION Bilateral 5/10/2024    Procedure: CYSTOSCOPY RETROGRADE PYELOGRAM WITH INSERTION STENT URETERAL;  Surgeon: José Luis Stephens MD;  [facility];  Service: Urology    SC CYSTO W/INSERT URETERAL STENT Bilateral 5/10/2024    Procedure: EXCHANGE STENT URETERAL;  Surgeon: José Luis Stephens MD;  [facility];  Service: Urology    SC CYSTO W/INSERT URETERAL STENT Bilateral 10/18/2024    Procedure: EXCHANGE STENT URETERAL;  Surgeon: José Luis Stephens MD;  Location: [facility] Main OR;  Service: Urology    SC CYSTO/URETERO W/LITHOTRIPSY &INDWELL STENT INSRT Bilateral 01/23/2023    Procedure: CYSTOSCOPY  RIGHT URETEROSCOPY WITH LITHOTRIPSY HOLMIUM LASER, BILATERAL RETROGRADE PYELOGRAM AND BILATERAL  URETERAL STENT INSERTION;  Surgeon: José Luis Stephens MD;  Location[facility];  Service: Urology    SC CYSTO/URETERO W/LITHOTRIPSY &INDWELL STENT INSRT Right 04/03/2023    Procedure: RIGHT URETEROSCOPY WITH LITHOTRIPSY HOLMIUM LASER, RETROGRADE PYELOGRAM; BILATERAL EXCHANGE STENT URETERAL;  Surgeon: José Luis Stephens MD;  Location: [facility];  Service: Urology    SC CYSTO/URETERO W/LITHOTRIPSY &INDWELL STENT INSRT Bilateral 10/11/2023    Procedure: CYSTOSCOPY URETEROSCOPY RETROGRADE PYELOGRAM AND INSERTION STENT URETERAL DIAGNOSTINC RIGHT URETEROSCOPY, REMOVAL STENT LEFT SIDE;  Surgeon: José Luis Stephens MD;  [facility];  Service: Urology    SC CYSTO/URETERO W/LITHOTRIPSY &INDWELL STENT INSRT Bilateral 9/26/2024    Procedure: CYSTOSCOPY ,LEFT  URETEROSCOPY,  BILATERAL   RETROGRADE PYELOGRAM AND BILATERAL URETERAL STENT EXCHANGE;  Surgeon: Favian Barber MD;  Location: [facility] Main [facility];  Service: Urology    VT CYSTO/URETERO W/LITHOTRIPSY &INDWELL STENT INSRT Left 10/18/2024    Procedure: CYSTOSCOPY LEFT URETEROSCOPY, treatment of LEFT ureteral stricture, RETROGRADE PYELOGRAM AND BILATERAL STENT EXCHANGE;  Surgeon: José Luis Stephens MD;  Location: [facility];  Service: Urology    VT INSJ TUNNELED CTR VAD W/SUBQ PORT AGE 5 YR/> N/A 03/21/2023    Procedure: INSERTION VENOUS PORT ( PORT-A-CATH) IR;  Surgeon: Mark Amos DO;  [facility];  Service: Interventional Radiology    VT LAPS COLECTOMY PRTL W/COLOPXTSTMY LW ANAST N/A 8/17/2023    Procedure: RESECTION COLON LOW ANTERIOR LAPAROSCOPIC WITH ROBOTICS;  Surgeon: Sree Umanzor MD;  Location: [facility];  Service: Colorectal    TONSILLECTOMY      URINARY SURGERY Bilateral     bilateral stents     Social History     Tobacco Use    Smoking status: Never     Passive exposure: Past    Smokeless tobacco: Never   Vaping Use    Vaping status: Never Used   Substance and Sexual Activity    Alcohol use: Never    Drug use: Never    Sexual activity: Not Currently     Partners: Male     Birth control/protection: Abstinence, Post-menopausal     E-Cigarette/Vaping    E-Cigarette Use Never User      E-Cigarette/Vaping Substances    Nicotine No     THC No     CBD No     Flavoring No      Family History   Problem Relation Age of Onset    Cancer Mother     Cirrhosis Father     Diabetes Father     Breast cancer Neg Hx     Breast cancer additional onset Neg Hx      Social History:  Marital Status:    Occupation:   Patient Pre-hospital Living Situation: Home  Patient Pre-hospital Level of Mobility: walks  Patient Pre-hospital Diet Restrictions: None    Meds/Allergies   I have reviewed home medications with patient personally.  Prior to Admission medications    Medication Sig Start Date End Date Taking? Authorizing Provider    acetaminophen (TYLENOL) 325 mg tablet Take 2 tablets (650 mg total) by mouth every 4 (four) hours as needed for mild pain 10/18/24   José Luis Stephens MD   cyanocobalamin (VITAMIN B-12) 1000 MCG tablet Take 1,000 mcg by mouth daily    Kavitha Hagen MD   docusate sodium (COLACE) 100 mg capsule Take 1 capsule (100 mg total) by mouth 2 (two) times a day for 15 days 10/18/24 11/11/24  José Luis Stephens MD   estrogens, conjugated (Premarin) vaginal cream Insert 0.3 g into the vagina 3 (three) times a week 5/10/24   José Luis Stephens MD   fenofibrate (TRICOR) 145 mg tablet Take 1 tablet (145 mg total) by mouth daily 2/2/24 2/1/25  Akua Machuca MD   ferrous sulfate 324 (65 Fe) mg TAKE 1 TABLET (324 MG TOTAL) BY MOUTH DAILY BEFORE BREAKFAST 12/3/24   Joselyn Reyes Bahamonde, MD   ferrous sulfate 324 (65 Fe) mg Take 1 tablet (324 mg total) by mouth daily before breakfast 12/3/24   Joselyn Reyes Bahamonde, MD   folic acid (FOLVITE) 1 mg tablet Take 1 tablet (1 mg total) by mouth daily 11/26/24   Jennifer Canseco MD   lisinopril (ZESTRIL) 5 mg tablet Take 1 tablet (5 mg total) by mouth daily 11/11/24   Joselyn Reyes Bahamonde, MD   methotrexate 2.5 MG tablet Take 6 tablets (15 mg total) by mouth once a week 11/26/24   Jennifer Canseco MD   ondansetron (ZOFRAN) 4 mg tablet Take 1 tablet (4 mg total) by mouth every 8 (eight) hours as needed for nausea or vomiting 7/25/24   Koko Pillai MD   oxybutynin (DITROPAN) 5 mg tablet Take 1 tablet (5 mg total) by mouth every 6 (six) hours as needed (bladder spasms) 10/18/24   José Luis Stephens MD   potassium chloride (MICRO-K) 10 MEQ CR capsule TAKE 4 CAPSULES (40 MEQ TOTAL) BY MOUTH 2 (TWO) TIMES A DAY 7/20/24 12/2/24  Leena Anaya MD   tamsulosin (FLOMAX) 0.4 mg Take 1 capsule (0.4 mg total) by mouth daily with dinner 8/2/24   José Luis Stephens MD   ursodiol (ACTIGALL) 300 mg capsule Take 3 capsules (900 mg total) by mouth in the morning 11/1/24 11/1/25  Akua  "MD Brigette     Allergies   Allergen Reactions    Oxaliplatin Shortness Of Breath     Reactions occurred with 2nd and 3rd infusions (about 1-3 hours from initiation of infusion) and required treatment with steroids and antihistamines. Please refer to allergy note on 2/7/2023 for detailed description of her reactions.    Morphine Nausea Only     \"Every dose made me nauseous\" (oral dosing only, can tolerate IV morphine)    Potassium Chloride Other (See Comments)     Pt reports \"burning\" with Iv potassium administration and wishes for it to be added to chart       Objective :  Temp:  [97.5 °F (36.4 °C)] 97.5 °F (36.4 °C)  HR:  [] 91  BP: (104-129)/(57-80) 110/57  Resp:  [18] 18  SpO2:  [95 %-99 %] 96 %  O2 Device: None (Room air)    Physical Exam  Vitals and nursing note reviewed.   Constitutional:       General: She is not in acute distress.     Appearance: She is well-developed.   HENT:      Head: Normocephalic and atraumatic.      Nose: Nose normal.      Mouth/Throat:      Mouth: Mucous membranes are dry.   Eyes:      Conjunctiva/sclera: Conjunctivae normal.   Cardiovascular:      Rate and Rhythm: Normal rate and regular rhythm.      Pulses: Normal pulses.      Heart sounds: Normal heart sounds. No murmur heard.  Pulmonary:      Effort: Pulmonary effort is normal. No respiratory distress.      Breath sounds: Normal breath sounds.   Abdominal:      General: There is no distension.      Palpations: Abdomen is soft.      Tenderness: There is abdominal tenderness in the epigastric area.      Comments: Patient reported no suprapubic pain on examination after receiving pain meds  Ileostomy bag present    Musculoskeletal:         General: No swelling.      Cervical back: Neck supple.      Right lower leg: No edema.      Left lower leg: No edema.   Skin:     General: Skin is warm and dry.      Capillary Refill: Capillary refill takes less than 2 seconds.   Neurological:      Mental Status: She is alert and oriented to " person, place, and time.   Psychiatric:         Mood and Affect: Mood normal.         Behavior: Behavior normal.          Lines/Drains:  Lines/Drains/Airways       Active Status       Name Placement date Placement time Site Days    Port A Cath 03/21/23 Right Internal jugular 03/21/23  1022  Internal jugular  639                    Central Line:  Goal for removal: N/A - Chronic PICC             Lab Results: I have reviewed the following results:  Results from last 7 days   Lab Units 12/20/24  0353 12/19/24  2222   WBC Thousand/uL  --  4.15*   HEMOGLOBIN g/dL  --  9.2*   HEMATOCRIT %  --  28.7*   PLATELETS Thousands/uL 191 205   SEGS PCT %  --  81*   LYMPHO PCT %  --  7*   MONO PCT %  --  5   EOS PCT %  --  7*     Results from last 7 days   Lab Units 12/19/24  2222   SODIUM mmol/L 139   POTASSIUM mmol/L 5.4*   CHLORIDE mmol/L 114*   CO2 mmol/L 21   BUN mg/dL 37*   CREATININE mg/dL 1.06   ANION GAP mmol/L 4   CALCIUM mg/dL 11.0*   ALBUMIN g/dL 4.0   TOTAL BILIRUBIN mg/dL 0.47   ALK PHOS U/L 142*   ALT U/L 85*   AST U/L 65*   GLUCOSE RANDOM mg/dL 87             Lab Results   Component Value Date    HGBA1C 5.2 09/10/2024    HGBA1C 6.3 (H) 06/30/2023    HGBA1C 6.0 (H) 03/13/2023           Imaging Results Review: I reviewed radiology reports from this admission including: CT abdomen/pelvis.      Administrative Statements   I have spent a total time of 30 minutes in caring for this patient on the day of the visit/encounter including Obtaining or reviewing history  .    ** Please Note: This note has been constructed using a voice recognition system. **

## 2024-12-20 NOTE — ASSESSMENT & PLAN NOTE
Patient reports waxing and waning suprapubic and genital pain since ureteral stent replacement in October  Also reports burning urination that is cloudy and dark orange-colored  History of UTI and patient reports symptoms are similar  Reports 2 weeks of rectal pain with history of hemorrhoids  Denies fevers and chills but endorses occasional nausea  UA revealed innumerable WBC and RBC  CT AP: Multiple calculi seen in both kidneys and adjacent to stents. Bilateral ureteral stents in place. There is bilateral mild hydronephrosis. Bilateral periureteral fat stranding, correlate with urinalysis for infection.   Previous urine culture grew lactobacillus and few gram-positive rods likely contaminant, no susceptibility information available  Did not meet SIRS criteria    Plan  Continue ceftriaxone  Follow-up urine cultures  Trend WBC and fever curve

## 2024-12-20 NOTE — ASSESSMENT & PLAN NOTE
Lab Results   Component Value Date    K 5.4 (H) 12/19/2024    K 4.6 12/09/2024     History of hypokalemia and takes 40 mEq twice daily    Plan  Hold potassium supplements  Continue to monitor BMP

## 2024-12-21 PROBLEM — R82.90 ABNORMAL URINALYSIS: Status: ACTIVE | Noted: 2024-09-24

## 2024-12-21 PROBLEM — K64.9 HEMORRHOID: Status: ACTIVE | Noted: 2024-12-21

## 2024-12-21 LAB
ANION GAP SERPL CALCULATED.3IONS-SCNC: 6 MMOL/L (ref 4–13)
ATRIAL RATE: 86 BPM
BACTERIA UR CULT: ABNORMAL
BACTERIA UR CULT: ABNORMAL
BASOPHILS # BLD AUTO: 0.01 THOUSANDS/ÂΜL (ref 0–0.1)
BASOPHILS NFR BLD AUTO: 0 % (ref 0–1)
BUN SERPL-MCNC: 29 MG/DL (ref 5–25)
CALCIUM SERPL-MCNC: 9.6 MG/DL (ref 8.4–10.2)
CHLORIDE SERPL-SCNC: 108 MMOL/L (ref 96–108)
CO2 SERPL-SCNC: 24 MMOL/L (ref 21–32)
CREAT SERPL-MCNC: 1.19 MG/DL (ref 0.6–1.3)
EOSINOPHIL # BLD AUTO: 0.22 THOUSAND/ÂΜL (ref 0–0.61)
EOSINOPHIL NFR BLD AUTO: 9 % (ref 0–6)
ERYTHROCYTE [DISTWIDTH] IN BLOOD BY AUTOMATED COUNT: 14.3 % (ref 11.6–15.1)
FERRITIN SERPL-MCNC: 531 NG/ML (ref 11–307)
GFR SERPL CREATININE-BSD FRML MDRD: 50 ML/MIN/1.73SQ M
GLUCOSE SERPL-MCNC: 88 MG/DL (ref 65–140)
HCT VFR BLD AUTO: 24.6 % (ref 34.8–46.1)
HGB BLD-MCNC: 8 G/DL (ref 11.5–15.4)
IMM GRANULOCYTES # BLD AUTO: 0 THOUSAND/UL (ref 0–0.2)
IMM GRANULOCYTES NFR BLD AUTO: 0 % (ref 0–2)
IRON SATN MFR SERPL: 31 % (ref 15–50)
IRON SERPL-MCNC: 105 UG/DL (ref 50–212)
LYMPHOCYTES # BLD AUTO: 0.23 THOUSANDS/ÂΜL (ref 0.6–4.47)
LYMPHOCYTES NFR BLD AUTO: 9 % (ref 14–44)
MCH RBC QN AUTO: 30 PG (ref 26.8–34.3)
MCHC RBC AUTO-ENTMCNC: 32.5 G/DL (ref 31.4–37.4)
MCV RBC AUTO: 92 FL (ref 82–98)
MONOCYTES # BLD AUTO: 0.11 THOUSAND/ÂΜL (ref 0.17–1.22)
MONOCYTES NFR BLD AUTO: 5 % (ref 4–12)
NEUTROPHILS # BLD AUTO: 1.87 THOUSANDS/ÂΜL (ref 1.85–7.62)
NEUTS SEG NFR BLD AUTO: 77 % (ref 43–75)
NRBC BLD AUTO-RTO: 0 /100 WBCS
P AXIS: 77 DEGREES
PLATELET # BLD AUTO: 171 THOUSANDS/UL (ref 149–390)
PMV BLD AUTO: 9.6 FL (ref 8.9–12.7)
POTASSIUM SERPL-SCNC: 4 MMOL/L (ref 3.5–5.3)
PR INTERVAL: 180 MS
QRS AXIS: 50 DEGREES
QRSD INTERVAL: 82 MS
QT INTERVAL: 346 MS
QTC INTERVAL: 414 MS
RBC # BLD AUTO: 2.67 MILLION/UL (ref 3.81–5.12)
SODIUM SERPL-SCNC: 138 MMOL/L (ref 135–147)
T WAVE AXIS: 59 DEGREES
TIBC SERPL-MCNC: 337.4 UG/DL (ref 250–450)
TRANSFERRIN SERPL-MCNC: 241 MG/DL (ref 203–362)
UIBC SERPL-MCNC: 232 UG/DL (ref 155–355)
VENTRICULAR RATE: 86 BPM
WBC # BLD AUTO: 2.44 THOUSAND/UL (ref 4.31–10.16)

## 2024-12-21 PROCEDURE — 83540 ASSAY OF IRON: CPT

## 2024-12-21 PROCEDURE — 80048 BASIC METABOLIC PNL TOTAL CA: CPT

## 2024-12-21 PROCEDURE — 83550 IRON BINDING TEST: CPT

## 2024-12-21 PROCEDURE — 93005 ELECTROCARDIOGRAM TRACING: CPT

## 2024-12-21 PROCEDURE — 82728 ASSAY OF FERRITIN: CPT

## 2024-12-21 PROCEDURE — 99233 SBSQ HOSP IP/OBS HIGH 50: CPT | Performed by: INTERNAL MEDICINE

## 2024-12-21 PROCEDURE — 85025 COMPLETE CBC W/AUTO DIFF WBC: CPT

## 2024-12-21 RX ORDER — CLOTRIMAZOLE 1 %
1 CREAM WITH APPLICATOR VAGINAL
Status: DISCONTINUED | OUTPATIENT
Start: 2024-12-21 | End: 2024-12-23 | Stop reason: HOSPADM

## 2024-12-21 RX ADMIN — HYDROCORTISONE: 25 CREAM TOPICAL at 08:10

## 2024-12-21 RX ADMIN — OXYCODONE HYDROCHLORIDE 5 MG: 5 TABLET ORAL at 12:18

## 2024-12-21 RX ADMIN — URSODIOL 900 MG: 300 CAPSULE ORAL at 08:08

## 2024-12-21 RX ADMIN — DEXTROSE 1000 MG: 50 INJECTION, SOLUTION INTRAVENOUS at 01:23

## 2024-12-21 RX ADMIN — CYANOCOBALAMIN TAB 500 MCG 1000 MCG: 500 TAB at 08:09

## 2024-12-21 RX ADMIN — HYDROCORTISONE 1 APPLICATION: 25 CREAM TOPICAL at 17:49

## 2024-12-21 RX ADMIN — OXYCODONE HYDROCHLORIDE 5 MG: 5 TABLET ORAL at 03:35

## 2024-12-21 RX ADMIN — TAMSULOSIN HYDROCHLORIDE 0.4 MG: 0.4 CAPSULE ORAL at 17:49

## 2024-12-21 RX ADMIN — FENOFIBRATE 145 MG: 145 TABLET ORAL at 08:09

## 2024-12-21 RX ADMIN — FERROUS SULFATE TAB 325 MG (65 MG ELEMENTAL FE) 325 MG: 325 (65 FE) TAB at 08:09

## 2024-12-21 RX ADMIN — OXYCODONE HYDROCHLORIDE 5 MG: 5 TABLET ORAL at 17:49

## 2024-12-21 RX ADMIN — HEPARIN SODIUM 5000 UNITS: 5000 INJECTION INTRAVENOUS; SUBCUTANEOUS at 14:15

## 2024-12-21 RX ADMIN — FOLIC ACID 1 MG: 1 TABLET ORAL at 08:09

## 2024-12-21 NOTE — ASSESSMENT & PLAN NOTE
Lab Results   Component Value Date    CALCIUM 9.6 12/21/2024    CALCIUM 10.2 12/20/2024      Likely secondary to sarcoidosis  Nephrolithiasis secondary to hypercalcemia  Better controlled with prednisone but currently off prednisone for ileostomy reversal  Follows nephrology outpatient  CT AP: Multiple calculi present in bilateral kidneys adjacent to ureteral stents.  Calcium improved  Continue to monitor BMP

## 2024-12-21 NOTE — PLAN OF CARE
Problem: PAIN - ADULT  Goal: Verbalizes/displays adequate comfort level or baseline comfort level  Description: Interventions:  - Encourage patient to monitor pain and request assistance  - Assess pain using appropriate pain scale  - Administer analgesics based on type and severity of pain and evaluate response  - Implement non-pharmacological measures as appropriate and evaluate response  - Consider cultural and social influences on pain and pain management  - Notify physician/advanced practitioner if interventions unsuccessful or patient reports new pain  Outcome: Progressing     Problem: INFECTION - ADULT  Goal: Absence or prevention of progression during hospitalization  Description: INTERVENTIONS:  - Assess and monitor for signs and symptoms of infection  - Monitor lab/diagnostic results  - Monitor all insertion sites, i.e. indwelling lines, tubes, and drains  - Monitor endotracheal if appropriate and nasal secretions for changes in amount and color  - [place] appropriate cooling/warming therapies per order  - Administer medications as ordered  - Instruct and encourage patient and family to use good hand hygiene technique  - Identify and instruct in appropriate isolation precautions for identified infection/condition  Outcome: Progressing  Goal: Absence of fever/infection during neutropenic period  Description: INTERVENTIONS:  - Monitor WBC    Outcome: Progressing     Problem: SAFETY ADULT  Goal: Patient will remain free of falls  Description: INTERVENTIONS:  - Educate patient/family on patient safety including physical limitations  - Instruct patient to call for assistance with activity   - Consult OT/PT to assist with strengthening/mobility   - Keep Call bell within reach  - Keep bed low and locked with side rails adjusted as appropriate  - Keep care items and personal belongings within reach  - Initiate and maintain comfort rounds  - Make Fall Risk Sign visible to staff  - Offer Toileting e Hours,  inadvance of need  - Initiate/Maintain alarm  - Obtain necessary fall risk management equipment:   - Apply yellow socks and bracelet for high fall risk patients  - Consider moving patient to room near nurses station  Outcome: Progressing  Goal: Maintain or return to baseline ADL function  Description: INTERVENTIONS:  -  Assess patient's ability to carry out ADLs; assess patient's baseline for ADL function and identify physical deficits which impact ability to perform ADLs (bathing, care of mouth/teeth, toileting, grooming, dressing, etc.)  - Assess/evaluate cause of self-care deficits   - Assess range of motion  - Assess patient's mobility; develop plan if impaired  - Assess patient's need for assistive devices and provide as appropriate  - Encourage maximum independence but intervene and supervise when necessary  - Involve family in performance of ADLs  - Assess for home care needs following discharge   - Consider OT consult to assist with ADL evaluation and planning for discharge  - Provide patient education as appropriate  Outcome: Progressing  Goal: Maintains/Returns to pre admission functional level  Description: INTERVENTIONS:  - Perform AM-PAC 6 Click Basic Mobility/ Daily Activity assessment daily.  - Set and communicate daily mobility goal to care team and patient/family/caregiver.   - Collaborate with rehabilitation services on mobility goals if consulted  - Perform Range of Motion  times a day.  - Reposition patient every  hours.  - Dangle patient  times a day  - Stand patient  times a day  - Ambulate patient  times a day  - Out of bed to chair  times a day   - Out of bed for meals  times a day  - Out of bed for toileting  - Record patient progress and toleration of activity level   Outcome: Progressing     Problem: DISCHARGE PLANNING  Goal: Discharge to home or other facility with appropriate resources  Description: INTERVENTIONS:  - Identify barriers to discharge w/patient and caregiver  - Arrange for  needed discharge resources and transportation as appropriate  - Identify discharge learning needs (meds, wound care, etc.)  - Arrange for interpretive services to assist at discharge as needed  - Refer to Case Management Department for coordinating discharge planning if the patient needs post-hospital services based on physician/advanced practitioner order or complex needs related to functional status, cognitive ability, or social support system  Outcome: Progressing     Problem: Knowledge Deficit  Goal: Patient/family/caregiver demonstrates understanding of disease process, treatment plan, medications, and discharge instructions  Description: Complete learning assessment and assess knowledge base.  Interventions:  - Provide teaching at level of understanding  - Provide teaching via preferred learning methods  Outcome: Progressing

## 2024-12-21 NOTE — UTILIZATION REVIEW
Initial Clinical Review    Admission: Date/Time/Statement:   Admission Orders (From admission, onward)       Ordered        12/20/24 0305  INPATIENT ADMISSION  Once                          Orders Placed This Encounter   Procedures    INPATIENT ADMISSION     Standing Status:   Standing     Number of Occurrences:   1     Level of Care:   Med Surg [16]     Estimated length of stay:   More than 2 Midnights     Certification:   I certify that inpatient services are medically necessary for this patient for a duration of greater than two midnights. See H&P and MD Progress Notes for additional information about the patient's course of treatment.     ED Arrival Information       Expected   -    Arrival   12/19/2024 19:53    Acuity   Urgent              Means of arrival   Walk-In    Escorted by   Family Member    Service   Hospitalist    Admission type   Emergency              Arrival complaint   Hemorrhoids             Chief Complaint   Patient presents with    Hemorrhoids     Pt has hemorrhoids that she states are getting bigger and more painful. Pt has ileostomy.     Pelvic Pain     Pt also has genital pain, pt has kidney stents, when pain is bad she gets them replaced which helps her pain. Pain with urination, cloudy, bloody, and malodorous. Took oxycodone at 6 with minimal relief.        Initial Presentation: 59 y.o. female with hx sarcoidosis, rectal cancer status post diverting colostomy, and chemoradiation therapy, hypercalcemia due to sarcoidosis, bilateral ureteral stent placement who presents to ED from home  12/19 with burning urination with abdominal and genital pain.  She reports waxing and waning abdominal and genital pain since her last stent replacement on 10/18. Pt reports cloudy and dark orange-colored urine. complains of rectal pain for the past 2 weeks secondary to hemorrhoids . Reporting occasional nausea. Patient has been noticing increasing mucus discharge from the rectal area, patient has been  noticed to have prolapsed hemorrhoid. Declines rectal exam in the ED. .On exam, suprapubic tenderness. RLQ colostomy .  Labs K 5.4 , Ca 11, WBC 4.15. UA large leuks , innumerable WBC, RBC . CT A/P shows multiple calculi and both kidneys as well as adjacent to stents. Bilateral ureteral stents in place. There is bilateral mild hydronephrosis and bilateral periureteral fat stranding  .  Urology said no role for intervention as long as stents are draining. Pt given IV analgesic, Iv abx in ED. Admitted as Inpatient 12/20 with possible UTI. Hydronephrosis. Plan- IV ceftriaxone . F/U uriencx . Trend WBC, fever curve . Monitor BMP .    Update 12/20- sarcoidosis- methotrexate and prednisone had been discontinued in anticipation for reversal of colostomy. Hypercalcemia has improved, monitor calcium level. WBC down to 3.43 . Labs in am .     Date: 12/21   Day 2:    WBC 2.44 . Urine culture + Candida parapsilosis > 100,000 .  D/C'ed ceftriaxone today . Consider fluconazole with caution as she has leukopenia . Pt c/o hemorrhoidal pain , not controlled enough with hydrocortisone cream alone consider phenylephrine cream . Pt denies hemorrhoidales bleeding. Patient refused to have digital rectal examination and also refused to have only inspection . AM labs :BMP, CBC, TIBC panel, ferritin, iron panel .     ED Treatment-Medication Administration from 12/19/2024 1953 to 12/20/2024 1900         Date/Time Order Dose Route Action     12/19/2024 2223 HYDROmorphone (DILAUDID) injection 0.5 mg 0.5 mg Intravenous Given     12/19/2024 2352 lidocaine (URO-JET) 2 % urethral/mucosal gel 1 Application 1 Application Urethral Given     12/20/2024 0034 ceftriaxone (ROCEPHIN) 1 g/50 mL in dextrose IVPB 1,000 mg Intravenous New Bag     12/20/2024 0110 iohexol (OMNIPAQUE) 350 MG/ML injection (MULTI-DOSE) 100 mL 85 mL Intravenous Given     12/20/2024 1002 cyanocobalamin (VITAMIN B-12) tablet 1,000 mcg 1,000 mcg Oral Given     12/20/2024 1002  fenofibrate (TRICOR) tablet 145 mg 145 mg Oral Given     12/20/2024 0856 ferrous sulfate tablet 325 mg 325 mg Oral Given     12/20/2024 0959 folic acid (FOLVITE) tablet 1 mg 1 mg Oral Given     12/20/2024 1726 tamsulosin (FLOMAX) capsule 0.4 mg 0.4 mg Oral Given     12/20/2024 1000 ursodiol (ACTIGALL) capsule 900 mg 900 mg Oral Given     12/20/2024 0626 heparin (porcine) subcutaneous injection 5,000 Units 5,000 Units Subcutaneous Given     12/20/2024 1000 oxyCODONE (ROXICODONE) split tablet 2.5 mg -- Oral See Alternative     12/20/2024 1000 oxyCODONE (ROXICODONE) IR tablet 5 mg 5 mg Oral Given     12/20/2024 0547 HYDROmorphone HCl (DILAUDID) injection 0.2 mg 0.2 mg Intravenous Given     12/20/2024 1008 magnesium sulfate 4 g/100 mL IVPB (premix) 4 g 4 g Intravenous New Bag     12/20/2024 1324 hydrocortisone (ANUSOL-HC) 2.5 % rectal cream -- Topical Given     12/20/2024 1724 hydrocortisone (ANUSOL-HC) 2.5 % rectal cream -- Topical Given            Scheduled Medications:  clotrimazole, 1 applicator, Vaginal, HS  cyanocobalamin, 1,000 mcg, Oral, Daily  fenofibrate, 145 mg, Oral, Daily  ferrous sulfate, 325 mg, Oral, Daily With Breakfast  folic acid, 1 mg, Oral, Daily  heparin (porcine), 5,000 Units, Subcutaneous, Q8H STANLEY  hydrocortisone, , Topical, BID  lisinopril, 5 mg, Oral, Daily  tamsulosin, 0.4 mg, Oral, Daily With Dinner  ursodiol, 900 mg, Oral, Daily    ceftriaxone (ROCEPHIN) 1 g/50 mL in dextrose IVPB  Dose: 1,000 mg  Freq: Every 24 hours Route: IV  Last Dose: 1,000 mg (12/21/24 0123)  Start: 12/21/24 0000 End: 12/21/24 1619  Continuous IV Infusions:     PRN Meds:  acetaminophen, 650 mg, Oral, Q4H PRN  HYDROmorphone, 0.2 mg, Intravenous, Q2H PRN  naloxone, 0.04 mg, Intravenous, Q1MIN PRN  ondansetron, 4 mg, Oral, Q6H PRN  oxybutynin, 5 mg, Oral, Q6H PRN  oxyCODONE, 2.5 mg, Oral, Q4H PRN   Or  oxyCODONE, 5 mg, Oral, Q4H PRN x1 12/20, x2  12/21      ED Triage Vitals [12/19/24 1957]   Temperature Pulse Respirations  Blood Pressure SpO2 Pain Score   97.5 °F (36.4 °C) 103 18 118/80 99 % 8     Weight (last 2 days)       None            Vital Signs (last 3 days)       Date/Time Temp Pulse Resp BP MAP (mmHg) SpO2 O2 Device Patient Position - Orthostatic VS [place] Coma Scale Score Pain    12/21/24 1500 99.2 °F (37.3 °C) -- 18 129/86 88 99 % -- Lying -- --    12/21/24 1218 -- -- -- -- -- -- -- -- -- 6    12/21/24 1100 -- -- -- -- -- -- -- -- -- 6    12/21/24 0810 -- -- -- -- -- -- -- -- 15 --    12/21/24 0809 -- -- -- 101/67 -- -- -- -- -- --    12/21/24 0335 -- -- -- -- -- -- -- -- -- 9    12/20/24 2334 98.4 °F (36.9 °C) 88 16 91/54 70 96 % -- -- -- --    12/20/24 2057 -- -- -- -- -- -- -- -- -- 7    12/20/24 2000 -- -- -- -- -- -- -- -- 15 No Pain    12/20/24 19:09:03 -- 93 -- 120/70 87 100 % -- -- -- --    12/20/24 1850 -- 98 16 143/68 -- 95 % None (Room air) -- -- --    12/20/24 1800 -- 87 16 120/67 87 98 % None (Room air) -- -- --    12/20/24 1500 -- 80 16 105/57 74 96 % None (Room air) -- -- --    12/20/24 1200 -- 88 -- 97/53 69 96 % -- -- -- --    12/20/24 1003 98.5 °F (36.9 °C) 100 16 107/58 -- 96 % None (Room air) -- -- --    12/20/24 1000 -- 99 -- 107/58 79 96 % -- -- -- --    12/20/24 0959 -- -- -- -- -- -- -- -- -- 7    12/20/24 0730 -- -- -- -- -- -- -- -- 15 --    12/20/24 0547 -- -- -- -- -- -- -- -- -- 7    12/20/24 0500 -- 91 -- 104/56 75 95 % -- -- -- --    12/20/24 0330 -- 91 -- 110/57 78 96 % -- -- -- --    12/20/24 0130 -- 96 -- 104/58 76 97 % -- -- -- --    12/20/24 0037 -- -- -- -- -- -- -- -- 15 5    12/19/24 2333 -- 94 -- 129/74 -- 95 % None (Room air) -- -- 5    12/19/24 2223 -- -- -- -- -- -- -- -- -- 7 12/19/24 1957 97.5 °F (36.4 °C) 103 18 118/80 95 99 % None (Room air) Sitting -- 8              Pertinent Labs/Diagnostic Test Results:   Radiology:  CT abdomen pelvis with contrast   Final Interpretation by Matheus Silva MD (12/20 9723)      Bilateral nephrolithiasis. Bilateral ureteral stents in place.  There is bilateral mild hydronephrosis. Bilateral periureteral fat stranding, correlate with urinalysis for infection.         Workstation performed: [id number]                   Results from last 7 days   Lab Units 12/21/24  0513 12/20/24 0624 12/20/24 0353 12/19/24  2222   WBC Thousand/uL 2.44* 3.43*  --  4.15*   HEMOGLOBIN g/dL 8.0* 8.5*  --  9.2*   HEMATOCRIT % 24.6* 26.5*  --  28.7*   PLATELETS Thousands/uL 171 187 191 205   TOTAL NEUT ABS Thousands/µL 1.87 2.83  --  3.33         Results from last 7 days   Lab Units 12/21/24  0513 12/20/24  0624 12/20/24 0353 12/19/24  2222   SODIUM mmol/L 138 139  --  139   POTASSIUM mmol/L 4.0 3.6 4.2 5.4*   CHLORIDE mmol/L 108 110*  --  114*   CO2 mmol/L 24 20*  --  21   ANION GAP mmol/L 6 9  --  4   BUN mg/dL 29* 33*  --  37*   CREATININE mg/dL 1.19 1.08  --  1.06   EGFR ml/min/1.73sq m 50 57  --  58   CALCIUM mg/dL 9.6 10.2  --  11.0*   MAGNESIUM mg/dL  --   --  1.7*  --      Results from last 7 days   Lab Units 12/19/24  2222   AST U/L 65*   ALT U/L 85*   ALK PHOS U/L 142*   TOTAL PROTEIN g/dL 7.0   ALBUMIN g/dL 4.0   TOTAL BILIRUBIN mg/dL 0.47         Results from last 7 days   Lab Units 12/21/24  0513 12/20/24  0624 12/19/24  2222   GLUCOSE RANDOM mg/dL 88 164* 87                   Results from last 7 days   Lab Units 12/19/24  2222   LIPASE u/L 39                 Results from last 7 days   Lab Units 12/19/24 2012   CLARITY UA  Extra Turbid   COLOR UA  Light Orange   SPEC GRAV UA  1.018   PH UA  5.5   GLUCOSE UA mg/dl Negative   KETONES UA mg/dl Negative   BLOOD UA  Large*   PROTEIN UA mg/dl 100 (2+)*   NITRITE UA  Negative   BILIRUBIN UA  Negative   UROBILINOGEN UA (BE) mg/dl <2.0   LEUKOCYTES UA  Large*   WBC UA /hpf Innumerable*   RBC UA /hpf Innumerable*   BACTERIA UA /hpf None Seen   EPITHELIAL CELLS WET PREP /hpf Occasional   MUCUS THREADS  Moderate*             Results from last 7 days   Lab Units 12/19/24 2012   URINE CULTURE  >100,000 cfu/ml Candida  parapsilosis*  <10,000 cfu/ml                   Past Medical History:   Diagnosis Date    Bleeding from colostomy stoma (HCC) 2/11/2023     states ileostomy , not colostomy    Cancer (HCC)     Colon cancer (HCC)     Elevated serum creatinine 09/11/2024    Fall     Headache     Hemochromatosis, unspecified     History of transfusion     2022 - no adverse reaction    Hypertension     Hypokalemia 09/12/2024    Kidney calculi     Kidney stone     Liver disease     hemangioma    Muscle weakness     Personal history of COVID-19 12/2022    Sarcoidosis     Seizures (HCC)     2022    Shingles      Present on Admission:   Sarcoidosis   Transaminitis   Hydronephrosis due to ureteral stricture   History of rectal cancer (HCC)   Abnormal urinalysis   Hypercalcemia with nephrolithiasis   CKD stage G3a/A2, GFR 45-59 and albumin creatinine ratio  mg/g (HCC)   Pancytopenia (HCC)      Admitting Diagnosis: Hydronephrosis [N13.30]  Ureterolithiasis [N20.1]  Urinary tract infection [N39.0]  External hemorrhoid [K64.4]  Ureteral stent present [Z96.0]  Age/Sex: 59 y.o. female    Network Utilization Review Department  ATTENTION: Please call with any questions or concerns to [phone] and carefully listen to the prompts so that you are directed to the right person. All voicemails are confidential.   For Discharge needs, contact Care Management DC Support Team at [phone] opt. [phone]  Send all requests for admission clinical reviews, approved or denied determinations and any other requests to dedicated fax number below belonging to the campus where the patient is receiving treatment. List of dedicated fax numbers for the Facilities:  FACILITY NAME UR FAX NUMBER   ADMISSION DENIALS (Administrative/Medical Necessity) [phone]   DISCHARGE SUPPORT TEAM (NETWORK) [phone]   PARENT CHILD HEALTH (Maternity/NICU/Pediatrics) [phone]   [facility] [phone]   [facility]  [place] [phone]   [facility] [phone]   [facility] [phone]   [facility], [facility] [phone]   [facility] [phone]   [facility] [phone]   [facility] [phone]   [facility] [phone]   [facility] [phone]   [facility] [phone]   [facility] [phone]

## 2024-12-21 NOTE — ASSESSMENT & PLAN NOTE
Bilateral ureteral stricture disease, Maintained with bilateral ureteral stents, last exchanged 10/18/24  CT AP: Bilateral nephrolithiasis. Bilateral ureteral stents in place. There is bilateral mild hydronephrosis. Bilateral periureteral fat stranding, correlate with urinalysis for infection.  Urology recommended no current interventions as long as bilateral stents are draining urine  Consulted urology for official note of their recommendation

## 2024-12-21 NOTE — ASSESSMENT & PLAN NOTE
Lung and hepatic involvement, elevated ACE level, NC granulomas on pleural biopsy and liver biopsy   Associated hypercalcemia  Off steroids in preparation for ileostomy reversal  Currently on methotrexate 2.5 mg and lisinopril 5 mg    Plan  Continue PTA methotrexate and lisinopril  If BMP is showing uptrending creatinine tomorrow will consider holding lisinopril  Continue folic acid with methotrexate

## 2024-12-21 NOTE — ASSESSMENT & PLAN NOTE
Lab Results   Component Value Date    AST 65 (H) 12/19/2024    AST 45 (H) 12/09/2024    ALT 85 (H) 12/19/2024    ALT 62 (H) 12/09/2024    ALKPHOS 142 (H) 12/19/2024    ALKPHOS 125 (H) 12/09/2024      Hepatic involvement of sarcoidosis  Follows up with gastroenterology  PTA fenofibrate 145 mg and ursodiol 300 mg 3 times daily    Plan  Continue PTA fenofibrate and ursodiol  Monitor CMP

## 2024-12-21 NOTE — ASSESSMENT & PLAN NOTE
Lab Results   Component Value Date    EGFR 50 12/21/2024    EGFR 57 12/20/2024    EGFR 58 12/19/2024    CREATININE 1.19 12/21/2024    CREATININE 1.08 12/20/2024    CREATININE 1.06 12/19/2024     Baseline creatinine 1.08 mg/dL, fluctuates   Secondary to obstructive uropathy in the setting of nephrolithiasis     Plan   Continue to monitor BMP  Avoid nephrotoxin  If creatinine is uptrending tomorrow will consider holding lisinopril

## 2024-12-21 NOTE — ASSESSMENT & PLAN NOTE
Patient reports waxing and waning suprapubic and genital pain since ureteral stent replacement in October  Also reports burning urination that is cloudy and dark orange-colored  History of UTI and patient reports symptoms are similar  Reports 2 weeks of rectal pain with history of hemorrhoids  Denies fevers and chills but endorses occasional nausea  UA revealed innumerable WBC and RBC  CT AP: Multiple calculi seen in both kidneys and adjacent to stents. Bilateral ureteral stents in place. There is bilateral mild hydronephrosis. Bilateral periureteral fat stranding, correlate with urinalysis for infection.   Previous urine culture grew lactobacillus and few gram-positive rods likely contaminant, no susceptibility information available  Did not meet SIRS criteria  Urine culture grows Candida parapsilosis more than 100,000    Plan  Discontinued ceftriaxone on 12/21/2024  It is most likely colonization but can consider fluconazole with caution as she has leukopenia  Will wait for the recommendation of urology  Trend WBC and fever curve

## 2024-12-21 NOTE — ASSESSMENT & PLAN NOTE
Has hemorrhoidal pain  Pain is not controlled enough with hydrocortisone cream alone  As per PCP note May consider phenylephrine cream  Denies bleeding  Will monitor pain and consider pain medication if topical things are not helping  As needed pain medications has been ordered  Patient refused to have digital rectal examination and also refused to have only inspection

## 2024-12-21 NOTE — PROGRESS NOTES
Progress Note - Hospitalist   Name: Danielle Haque 59 y.o. female I MRN: [id number]  Unit/Bed#: W -01 I Date of Admission: 12/19/2024   Date of Service: 12/21/2024 I Hospital Day: 1    Assessment & Plan  Abnormal urinalysis  Patient reports waxing and waning suprapubic and genital pain since ureteral stent replacement in October  Also reports burning urination that is cloudy and dark orange-colored  History of UTI and patient reports symptoms are similar  Reports 2 weeks of rectal pain with history of hemorrhoids  Denies fevers and chills but endorses occasional nausea  UA revealed innumerable WBC and RBC  CT AP: Multiple calculi seen in both kidneys and adjacent to stents. Bilateral ureteral stents in place. There is bilateral mild hydronephrosis. Bilateral periureteral fat stranding, correlate with urinalysis for infection.   Previous urine culture grew lactobacillus and few gram-positive rods likely contaminant, no susceptibility information available  Did not meet SIRS criteria  Urine culture grows Candida parapsilosis more than 100,000    Plan  Discontinued ceftriaxone on 12/21/2024  It is most likely colonization but can consider fluconazole with caution as she has leukopenia  Will wait for the recommendation of urology  Trend WBC and fever curve  Hydronephrosis due to ureteral stricture  Bilateral ureteral stricture disease, Maintained with bilateral ureteral stents, last exchanged 10/18/24  CT AP: Bilateral nephrolithiasis. Bilateral ureteral stents in place. There is bilateral mild hydronephrosis. Bilateral periureteral fat stranding, correlate with urinalysis for infection.  Urology recommended no current interventions as long as bilateral stents are draining urine  Consulted urology for official note of their recommendation    Sarcoidosis  Lung and hepatic involvement, elevated ACE level, NC granulomas on pleural biopsy and liver biopsy   Associated hypercalcemia  Off steroids in preparation for  ileostomy reversal  Currently on methotrexate 2.5 mg and lisinopril 5 mg    Plan  Continue PTA methotrexate and lisinopril  If BMP is showing uptrending creatinine tomorrow will consider holding lisinopril  Continue folic acid with methotrexate  Hyperkalemia  Lab Results   Component Value Date    K 4.0 12/21/2024    K 3.6 12/20/2024     History of hypokalemia and takes 40 mEq twice daily    Plan  Hold potassium supplements  Continue to monitor BMP  Hypercalcemia with nephrolithiasis  Lab Results   Component Value Date    CALCIUM 9.6 12/21/2024    CALCIUM 10.2 12/20/2024      Likely secondary to sarcoidosis  Nephrolithiasis secondary to hypercalcemia  Better controlled with prednisone but currently off prednisone for ileostomy reversal  Follows nephrology outpatient  CT AP: Multiple calculi present in bilateral kidneys adjacent to ureteral stents.  Calcium improved  Continue to monitor BMP    Transaminitis  Lab Results   Component Value Date    AST 65 (H) 12/19/2024    AST 45 (H) 12/09/2024    ALT 85 (H) 12/19/2024    ALT 62 (H) 12/09/2024    ALKPHOS 142 (H) 12/19/2024    ALKPHOS 125 (H) 12/09/2024      Hepatic involvement of sarcoidosis  Follows up with gastroenterology  PTA fenofibrate 145 mg and ursodiol 300 mg 3 times daily    Plan  Continue PTA fenofibrate and ursodiol  Monitor CMP  CKD stage G3a/A2, GFR 45-59 and albumin creatinine ratio  mg/g (HCC)  Lab Results   Component Value Date    EGFR 50 12/21/2024    EGFR 57 12/20/2024    EGFR 58 12/19/2024    CREATININE 1.19 12/21/2024    CREATININE 1.08 12/20/2024    CREATININE 1.06 12/19/2024     Baseline creatinine 1.08 mg/dL, fluctuates   Secondary to obstructive uropathy in the setting of nephrolithiasis     Plan   Continue to monitor BMP  Avoid nephrotoxin  If creatinine is uptrending tomorrow will consider holding lisinopril  History of rectal cancer (HCC)  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   Follows with heme-onc outpatient, per recent visit she does not have clinical, laboratory, or imaging evidence of recurrent or metastatic disease   Pancytopenia (HCC)  Lab Results   Component Value Date    HGB 8.0 (L) 12/21/2024    HGB 8.5 (L) 12/20/2024    WBC 2.44 (L) 12/21/2024    WBC 3.43 (L) 12/20/2024      Likely secondary to sarcoidosis, methotrexate, radiation  Follows up with heme/onc    Plan   Continue to monitor CBC  Continue folic acid  Follow-up on iron panel    Hemorrhoid  Has hemorrhoidal pain  Pain is not controlled enough with hydrocortisone cream alone  As per PCP note May consider phenylephrine cream  Denies bleeding  Will monitor pain and consider pain medication if topical things are not helping  As needed pain medications has been ordered  Patient refused to have digital rectal examination and also refused to have only inspection    VTE Pharmacologic Prophylaxis: VTE Score: 4 Moderate Risk (Score 3-4) - Pharmacological DVT Prophylaxis Ordered: heparin.    Mobility:   Basic Mobility Inpatient Raw Score: 24  JH-HLM Goal: 8: Walk 250 feet or more  JH-HLM Achieved: 8: Walk 250 feet ot more  JH-HLM Goal achieved. Continue to encourage appropriate mobility.    Patient Centered Rounds: I performed bedside rounds with nursing staff today.   Discussions with Specialists or Other Care Team Provider: Urology    Education and Discussions with Family / Patient: Updated  () via phone.    Current Length of Stay: 1 day(s)  Current Patient Status: Inpatient   Certification Statement: The patient will continue to require additional inpatient hospital stay due to abnormal urinalysis  Discharge Plan: Anticipate discharge in 24-48 hrs to home.    Code Status: Level 1 - Full Code    Subjective   Patient was seen and examined at bedside this morning.  She was sitting at the edge of the bed and was taking her breakfast without any complaint.  Denies any belly pain,  chest pain, shortness of breath, nausea, vomiting, palpitation.  Mentioned having hemorrhoidal pain and received his Oxycodone at 5 AM.    Objective :  Temp:  [98.4 °F (36.9 °C)-99.2 °F (37.3 °C)] 99.2 °F (37.3 °C)  HR:  [87-98] 88  BP: ()/(54-86) 129/86  Resp:  [16-18] 18  SpO2:  [95 %-100 %] 99 %  O2 Device: None (Room air)    There is no height or weight on file to calculate BMI.     Input and Output Summary (last 24 hours):     Intake/Output Summary (Last 24 hours) at 12/21/2024 1624  Last data filed at 12/21/2024 1401  Gross per 24 hour   Intake 530 ml   Output 0 ml   Net 530 ml       Physical Exam  Vitals and nursing note reviewed.   Constitutional:       General: She is not in acute distress.     Appearance: She is well-developed.   HENT:      Head: Normocephalic and atraumatic.      Nose: Nose normal.      Mouth/Throat:      Mouth: Mucous membranes are dry.   Eyes:      Conjunctiva/sclera: Conjunctivae normal.   Cardiovascular:      Rate and Rhythm: Normal rate and regular rhythm.      Pulses: Normal pulses.      Heart sounds: Normal heart sounds. No murmur heard.  Pulmonary:      Effort: Pulmonary effort is normal. No respiratory distress.      Breath sounds: Normal breath sounds.   Abdominal:      General: There is no distension.      Palpations: Abdomen is soft.      Tenderness: There is no abdominal tenderness.      Comments: Patient reported no suprapubic pain on examination after receiving pain meds  Ileostomy bag present    Musculoskeletal:         General: No swelling.      Cervical back: Neck supple.      Right lower leg: No edema.      Left lower leg: No edema.   Skin:     General: Skin is warm and dry.      Capillary Refill: Capillary refill takes less than 2 seconds.   Neurological:      Mental Status: She is alert and oriented to person, place, and time.   Psychiatric:         Mood and Affect: Mood normal.         Behavior: Behavior normal.           Lines/Drains:  Lines/Drains/Airways        Active Status       Name Placement date Placement time Site Days    Port A Cath 03/21/23 Right Internal jugular 03/21/23  1022  Internal jugular  641                    Central Line:  Goal for removal: N/A - Chronic PICC               Lab Results: I have reviewed the following results:   Results from last 7 days   Lab Units 12/21/24  0513   WBC Thousand/uL 2.44*   HEMOGLOBIN g/dL 8.0*   HEMATOCRIT % 24.6*   PLATELETS Thousands/uL 171   SEGS PCT % 77*   LYMPHO PCT % 9*   MONO PCT % 5   EOS PCT % 9*     Results from last 7 days   Lab Units 12/21/24  0513 12/20/24  0353 12/19/24  2222   SODIUM mmol/L 138   < > 139   POTASSIUM mmol/L 4.0   < > 5.4*   CHLORIDE mmol/L 108   < > 114*   CO2 mmol/L 24   < > 21   BUN mg/dL 29*   < > 37*   CREATININE mg/dL 1.19   < > 1.06   ANION GAP mmol/L 6   < > 4   CALCIUM mg/dL 9.6   < > 11.0*   ALBUMIN g/dL  --   --  4.0   TOTAL BILIRUBIN mg/dL  --   --  0.47   ALK PHOS U/L  --   --  142*   ALT U/L  --   --  85*   AST U/L  --   --  65*   GLUCOSE RANDOM mg/dL 88   < > 87    < > = values in this interval not displayed.                       Recent Cultures (last 7 days):   Results from last 7 days   Lab Units 12/19/24 2012   URINE CULTURE  >100,000 cfu/ml Candida parapsilosis*  <10,000 cfu/ml       Imaging Results Review: No pertinent imaging studies reviewed.  Other Study Results Review: No additional pertinent studies reviewed.    Last 24 Hours Medication List:     Current Facility-Administered Medications:     acetaminophen (TYLENOL) tablet 650 mg, Q4H PRN    clotrimazole (GYNE-LOTRIMIN) 1 % vaginal cream 1 applicator, HS    cyanocobalamin (VITAMIN B-12) tablet 1,000 mcg, Daily    fenofibrate (TRICOR) tablet 145 mg, Daily    ferrous sulfate tablet 325 mg, Daily With Breakfast    folic acid (FOLVITE) tablet 1 mg, Daily    heparin (porcine) subcutaneous injection 5,000 Units, Q8H STANLEY    hydrocortisone (ANUSOL-HC) 2.5 % rectal cream, BID    HYDROmorphone HCl (DILAUDID) injection 0.2  mg, Q2H PRN    lisinopril (ZESTRIL) tablet 5 mg, Daily    naloxone (NARCAN) 0.04 mg/mL syringe 0.04 mg, Q1MIN PRN    ondansetron (ZOFRAN-ODT) dispersible tablet 4 mg, Q6H PRN    oxybutynin (DITROPAN) tablet 5 mg, Q6H PRN    oxyCODONE (ROXICODONE) split tablet 2.5 mg, Q4H PRN **OR** oxyCODONE (ROXICODONE) IR tablet 5 mg, Q4H PRN    tamsulosin (FLOMAX) capsule 0.4 mg, Daily With Dinner    ursodiol (ACTIGALL) capsule 900 mg, Daily    Administrative Statements   Today, Patient Was Seen By: Terrence Phoenix MD  I have spent a total time of 45 minutes in caring for this patient on the day of the visit/encounter including Diagnostic results, Prognosis, Risks and benefits of tx options, Instructions for management, Patient and family education, Importance of tx compliance, Risk factor reductions, Impressions, Counseling / Coordination of care, Documenting in the medical record, Reviewing / ordering tests, medicine, procedures  , Obtaining or reviewing history  , and Communicating with other healthcare professionals .    **Please Note: This note may have been constructed using a voice recognition system.**

## 2024-12-21 NOTE — ASSESSMENT & PLAN NOTE
Lab Results   Component Value Date    K 4.0 12/21/2024    K 3.6 12/20/2024     History of hypokalemia and takes 40 mEq twice daily    Plan  Hold potassium supplements  Continue to monitor BMP

## 2024-12-21 NOTE — ASSESSMENT & PLAN NOTE
Lab Results   Component Value Date    HGB 8.0 (L) 12/21/2024    HGB 8.5 (L) 12/20/2024    WBC 2.44 (L) 12/21/2024    WBC 3.43 (L) 12/20/2024      Likely secondary to sarcoidosis, methotrexate, radiation  Follows up with heme/onc    Plan   Continue to monitor CBC  Continue folic acid  Follow-up on iron panel

## 2024-12-22 LAB
ANION GAP SERPL CALCULATED.3IONS-SCNC: 6 MMOL/L (ref 4–13)
BUN SERPL-MCNC: 30 MG/DL (ref 5–25)
CALCIUM SERPL-MCNC: 10 MG/DL (ref 8.4–10.2)
CHLORIDE SERPL-SCNC: 108 MMOL/L (ref 96–108)
CO2 SERPL-SCNC: 24 MMOL/L (ref 21–32)
CREAT SERPL-MCNC: 1.05 MG/DL (ref 0.6–1.3)
ERYTHROCYTE [DISTWIDTH] IN BLOOD BY AUTOMATED COUNT: 13.9 % (ref 11.6–15.1)
GFR SERPL CREATININE-BSD FRML MDRD: 59 ML/MIN/1.73SQ M
GLUCOSE SERPL-MCNC: 88 MG/DL (ref 65–140)
HCT VFR BLD AUTO: 24 % (ref 34.8–46.1)
HGB BLD-MCNC: 8 G/DL (ref 11.5–15.4)
MCH RBC QN AUTO: 30.5 PG (ref 26.8–34.3)
MCHC RBC AUTO-ENTMCNC: 33.3 G/DL (ref 31.4–37.4)
MCV RBC AUTO: 92 FL (ref 82–98)
PLATELET # BLD AUTO: 163 THOUSANDS/UL (ref 149–390)
PMV BLD AUTO: 9.6 FL (ref 8.9–12.7)
POTASSIUM SERPL-SCNC: 3.7 MMOL/L (ref 3.5–5.3)
RBC # BLD AUTO: 2.62 MILLION/UL (ref 3.81–5.12)
SODIUM SERPL-SCNC: 138 MMOL/L (ref 135–147)
WBC # BLD AUTO: 2.68 THOUSAND/UL (ref 4.31–10.16)

## 2024-12-22 PROCEDURE — 99232 SBSQ HOSP IP/OBS MODERATE 35: CPT | Performed by: INTERNAL MEDICINE

## 2024-12-22 PROCEDURE — 85027 COMPLETE CBC AUTOMATED: CPT | Performed by: INTERNAL MEDICINE

## 2024-12-22 PROCEDURE — 80048 BASIC METABOLIC PNL TOTAL CA: CPT | Performed by: INTERNAL MEDICINE

## 2024-12-22 PROCEDURE — 99255 IP/OBS CONSLTJ NEW/EST HI 80: CPT | Performed by: NURSE PRACTITIONER

## 2024-12-22 RX ADMIN — FERROUS SULFATE TAB 325 MG (65 MG ELEMENTAL FE) 325 MG: 325 (65 FE) TAB at 08:11

## 2024-12-22 RX ADMIN — OXYCODONE HYDROCHLORIDE 5 MG: 5 TABLET ORAL at 06:15

## 2024-12-22 RX ADMIN — HEPARIN SODIUM 5000 UNITS: 5000 INJECTION INTRAVENOUS; SUBCUTANEOUS at 13:37

## 2024-12-22 RX ADMIN — FOLIC ACID 1 MG: 1 TABLET ORAL at 08:11

## 2024-12-22 RX ADMIN — FENOFIBRATE 145 MG: 145 TABLET ORAL at 08:11

## 2024-12-22 RX ADMIN — URSODIOL 900 MG: 300 CAPSULE ORAL at 08:12

## 2024-12-22 RX ADMIN — Medication 2.5 MG: at 11:39

## 2024-12-22 RX ADMIN — HYDROCORTISONE: 25 CREAM TOPICAL at 17:14

## 2024-12-22 RX ADMIN — LISINOPRIL 5 MG: 5 TABLET ORAL at 08:12

## 2024-12-22 RX ADMIN — Medication 2.5 MG: at 17:13

## 2024-12-22 RX ADMIN — OXYCODONE HYDROCHLORIDE 5 MG: 5 TABLET ORAL at 22:48

## 2024-12-22 RX ADMIN — TAMSULOSIN HYDROCHLORIDE 0.4 MG: 0.4 CAPSULE ORAL at 17:13

## 2024-12-22 RX ADMIN — CYANOCOBALAMIN TAB 500 MCG 1000 MCG: 500 TAB at 08:12

## 2024-12-22 RX ADMIN — OXYCODONE HYDROCHLORIDE 5 MG: 5 TABLET ORAL at 00:07

## 2024-12-22 RX ADMIN — HYDROCORTISONE: 25 CREAM TOPICAL at 08:13

## 2024-12-22 NOTE — CONSULTS
Consultation - Urology   Name: Danielle Haque 59 y.o. female I MRN: [id number]  Unit/Bed#: W -01 I Date of Admission: 12/19/2024   Date of Service: 12/22/2024 I Hospital Day: 2   Inpatient consult to Urology  Consult performed by: ANTHONY Wood  Consult ordered by: Janae Morris MD        Physician Requesting Evaluation: Janae Bryan*   Reason for Evaluation / Principal Problem: Hydronephrosis    Assessment & Plan  Abnormal urinalysis  Anticipated finding in patients with long-term indwelling drains including ureteral stents, nephrostomy tubes, suprapubic tubes and indwelling Kaur catheters.  Consider treatment decisions on constellation of clinical data in conjunction with abnormal urine studies such as presence of fever, chills, worsening leukocytosis, biomarkers suggesting toxicity including lactic acid and procalcitonin levels.    Plan:  Positive yeast on urine culture.  Likely due to immunocompromised status.  Would observe off oral antibiotics or antifungals.  On topical clotrimazole  May administer Pyridium for complaints of dysuria.  Hydronephrosis due to ureteral stricture  Bilateral ureteral stent dependent  Plan for outpatient trial without left stent.  Will require reconstruction on the right at an interval.  Mild bilateral hydronephrosis despite stent placement.  CT demonstrates good position.  Renal function within normal limits.  Periureteral inflammatory changes on CT--resulting in appearance of mild hydronephrosis.  However low suspicion for obstruction    History of rectal cancer (HCC)  History of laparoscopic diverting colostomy and neoadjuvant chemo-radiation.  Hypercalcemia with nephrolithiasis  Bilateral nonobstructing intrarenal calculi--obstruction mitigated via ureteral stents.  No indication for urgent stone treatment.        Urology service signing off.  Follow-up with Dr. José Luis Stephens outpatient.  Office will contact patient with appointment  date and time.    History of Present Illness   Danielle Haque is a 59 y.o. female with history of colorectal cancer, recurrent nephrolithiasis, bilateral ureteral stricture, maintain with bilateral ureteral stents.  Of note right proximal ureteral stricture is recalcitrant to multiple endoscopic treatments.  She is status post cystoscopy, left ureteroscopy, balloon dilatation of left ureteral stricture 10/18/2024.  Long-term plan included offering trial without ureteral stricture on the left and referral to reconstructive urology for management of right proximal ureteral stricture post ileostomy reversal.  Medical history is complicated by sarcoidosis for which patient is treated medically with methotrexate and prednisone.  This is temporarily discontinued in preparation for planned GI surgery.  Our service was contacted against background of CT demonstrating mild bilateral hydronephrosis on CT.  There are multiple intrarenal calculi bilaterally.  However, both stents are favorably positioned.  Renal function is normal.  Urine culture positive for Candida.    Review of Systems  Review of Systems - History obtained from chart review and the patient  General ROS: negative for - chills or fever  Respiratory ROS: no cough, shortness of breath, or wheezing  Cardiovascular ROS: no chest pain or dyspnea on exertion  Gastrointestinal ROS: no abdominal pain, change in bowel habits, or black or bloody stools  Genito-Urinary ROS: positive for - dysuria  Neurological ROS: no TIA or stroke symptoms    I have reviewed the patient's PMH, PSH, Social History, Family History, Meds, and Allergies  Historical Information   Past Medical History:   Diagnosis Date    Bleeding from colostomy stoma (HCC) 2/11/2023     states ileostomy , not colostomy    Cancer (HCC)     Colon cancer (HCC)     Elevated serum creatinine 09/11/2024    Fall     Headache     Hemochromatosis, unspecified     History of transfusion     2022 - no adverse  reaction    Hypertension     Hypokalemia 09/12/2024    Kidney calculi     Kidney stone     Liver disease     hemangioma    Muscle weakness     Personal history of COVID-19 12/2022    Sarcoidosis     Seizures (HCC)     2022    Shingles      Past Surgical History:   Procedure Laterality Date    ABSCESS DRAINAGE      left breast    BREAST BIOPSY      CHEST TUBE INSERTION      COLON SURGERY      colostomy    COLONOSCOPY      FL GUIDED CENTRAL VENOUS ACCESS DEVICE INSERTION  03/21/2023    FL RETROGRADE PYELOGRAM  01/23/2023    FL RETROGRADE PYELOGRAM  04/03/2023    FL RETROGRADE PYELOGRAM  7/21/2023    FL RETROGRADE PYELOGRAM  10/11/2023    FL RETROGRADE PYELOGRAM  12/15/2023    FL RETROGRADE PYELOGRAM  2/8/2024    FL RETROGRADE PYELOGRAM  5/10/2024    FL RETROGRADE PYELOGRAM  9/26/2024    FL RETROGRADE PYELOGRAM  10/18/2024    IR BIOPSY LIVER MASS  05/09/2023    LUNG BIOPSY      HI CYSTO BLADDER W/URETERAL CATHETERIZATION Bilateral 07/21/2023    Procedure: CYSTOSCOPY URETEROSCOPY RETROGRADE PYELOGRAM WITH BILATERAL STENT URETERAL EXCHANGE; LEFT LASER LITHOTRIPSY AND STONE BASKET RETRIEVAL;  Surgeon: José Luis Stephens MD;  [facility];  Service: Urology    HI CYSTO BLADDER W/URETERAL CATHETERIZATION Bilateral 12/15/2023    Procedure: CYSTOSCOPY, BILATERAL  RETROGRADE PYELOGRAM , STENT EXCHANGE RIGHT , LEFT URETEROSCOPY ,  HOLMIUM LASER WITH INSERTION LEFT URETERAL STENT;  Surgeon: Derick Bhakta MD;  [facility];  Service: Urology    HI CYSTO BLADDER W/URETERAL CATHETERIZATION Left 2/8/2024    Procedure: CYSTOSCOPY B/L RETROGRADE PYELOGRAM WITH B/L T URETERALSTENT EXCHANGE,LEFT URETEROSCOPY, DILATION LEFT URETERAL STICTURE;  Surgeon: José Luis Stephens MD;  [facility];  Service: Urology    HI CYSTO BLADDER W/URETERAL CATHETERIZATION Bilateral 5/10/2024    Procedure: CYSTOSCOPY RETROGRADE PYELOGRAM WITH INSERTION STENT URETERAL;  Surgeon: José Luis Stephens MD;  [facility];  Service: Urology    TX CYSTO W/INSERT URETERAL STENT Bilateral 5/10/2024    Procedure: EXCHANGE STENT URETERAL;  Surgeon: José Luis Stephens MD;  [facility];  Service: Urology    TX CYSTO W/INSERT URETERAL STENT Bilateral 10/18/2024    Procedure: EXCHANGE STENT URETERAL;  Surgeon: José Luis Stephens MD;  [facility];  Service: Urology    TX CYSTO/URETERO W/LITHOTRIPSY &INDWELL STENT INSRT Bilateral 01/23/2023    Procedure: CYSTOSCOPY  RIGHT URETEROSCOPY WITH LITHOTRIPSY HOLMIUM LASER, BILATERAL RETROGRADE PYELOGRAM AND BILATERAL  URETERAL STENT INSERTION;  Surgeon: José Luis Stephens MD;  [facility];  Service: Urology    TX CYSTO/URETERO W/LITHOTRIPSY &INDWELL STENT INSRT Right 04/03/2023    Procedure: RIGHT URETEROSCOPY WITH LITHOTRIPSY HOLMIUM LASER, RETROGRADE PYELOGRAM; BILATERAL EXCHANGE STENT URETERAL;  Surgeon: José Luis Stephens MD;  [facility];  Service: Urology    TX CYSTO/URETERO W/LITHOTRIPSY &INDWELL STENT INSRT Bilateral 10/11/2023    Procedure: CYSTOSCOPY URETEROSCOPY RETROGRADE PYELOGRAM AND INSERTION STENT URETERAL DIAGNOSTINC RIGHT URETEROSCOPY, REMOVAL STENT LEFT SIDE;  Surgeon: José Luis Stephens MD;  [facility];  Service: Urology    TX CYSTO/URETERO W/LITHOTRIPSY &INDWELL STENT INSRT Bilateral 9/26/2024    Procedure: CYSTOSCOPY ,LEFT  URETEROSCOPY,  BILATERAL  RETROGRADE PYELOGRAM AND BILATERAL URETERAL STENT EXCHANGE;  Surgeon: Favian Barber MD;  [facility];  Service: Urology    TX CYSTO/URETERO W/LITHOTRIPSY &INDWELL STENT INSRT Left 10/18/2024    Procedure: CYSTOSCOPY LEFT URETEROSCOPY, treatment of LEFT ureteral stricture, RETROGRADE PYELOGRAM AND BILATERAL STENT EXCHANGE;  Surgeon: José Luis Stephens MD;  [facility];  Service: Urology    TX INSJ TUNNELED CTR VAD W/SUBQ PORT AGE 5 YR/> N/A 03/21/2023    Procedure: INSERTION VENOUS PORT ( PORT-A-CATH) IR;  Surgeon: Mark Amos,  DO;  Location: [facility];  Service: Interventional Radiology    NC LAPS COLECTOMY PRTL W/COLOPXTSTMY LW ANAST N/A 8/17/2023    Procedure: RESECTION COLON LOW ANTERIOR LAPAROSCOPIC WITH ROBOTICS;  Surgeon: Sree Umanzor MD;  [facility];  Service: Colorectal    TONSILLECTOMY      URINARY SURGERY Bilateral     bilateral stents     Social History     Tobacco Use    Smoking status: Never     Passive exposure: Past    Smokeless tobacco: Never   Vaping Use    Vaping status: Never Used   Substance and Sexual Activity    Alcohol use: Never    Drug use: Never    Sexual activity: Not Currently     Partners: Male     Birth control/protection: Abstinence, Post-menopausal     E-Cigarette/Vaping    E-Cigarette Use Never User      E-Cigarette/Vaping Substances    Nicotine No     THC No     CBD No     Flavoring No      Family History   Problem Relation Age of Onset    Cancer Mother     Cirrhosis Father     Diabetes Father     Breast cancer Neg Hx     Breast cancer additional onset Neg Hx      Social History     Tobacco Use    Smoking status: Never     Passive exposure: Past    Smokeless tobacco: Never   Vaping Use    Vaping status: Never Used   Substance and Sexual Activity    Alcohol use: Never    Drug use: Never    Sexual activity: Not Currently     Partners: Male     Birth control/protection: Abstinence, Post-menopausal       Current Facility-Administered Medications:     acetaminophen (TYLENOL) tablet 650 mg, Q4H PRN    clotrimazole (GYNE-LOTRIMIN) 1 % vaginal cream 1 applicator, HS    cyanocobalamin (VITAMIN B-12) tablet 1,000 mcg, Daily    fenofibrate (TRICOR) tablet 145 mg, Daily    ferrous sulfate tablet 325 mg, Daily With Breakfast    folic acid (FOLVITE) tablet 1 mg, Daily    heparin (porcine) subcutaneous injection 5,000 Units, Q8H STANLEY    hydrocortisone (ANUSOL-HC) 2.5 % rectal cream, BID    HYDROmorphone HCl (DILAUDID) injection 0.2 mg, Q2H PRN    lisinopril (ZESTRIL) tablet 5 mg, Daily    naloxone  (NARCAN) 0.04 mg/mL syringe 0.04 mg, Q1MIN PRN    ondansetron (ZOFRAN-ODT) dispersible tablet 4 mg, Q6H PRN    oxybutynin (DITROPAN) tablet 5 mg, Q6H PRN    oxyCODONE (ROXICODONE) split tablet 2.5 mg, Q4H PRN **OR** oxyCODONE (ROXICODONE) IR tablet 5 mg, Q4H PRN    tamsulosin (FLOMAX) capsule 0.4 mg, Daily With Dinner    ursodiol (ACTIGALL) capsule 900 mg, Daily  Prior to Admission Medications   Prescriptions Last Dose Informant Patient Reported? Taking?   acetaminophen (TYLENOL) 325 mg tablet  Self No No   Sig: Take 2 tablets (650 mg total) by mouth every 4 (four) hours as needed for mild pain   cyanocobalamin (VITAMIN B-12) 1000 MCG tablet  Self Yes No   Sig: Take 1,000 mcg by mouth daily   docusate sodium (COLACE) 100 mg capsule  Self No No   Sig: Take 1 capsule (100 mg total) by mouth 2 (two) times a day for 15 days   estrogens, conjugated (Premarin) vaginal cream  Self No No   Sig: Insert 0.3 g into the vagina 3 (three) times a week   fenofibrate (TRICOR) 145 mg tablet  Self No No   Sig: Take 1 tablet (145 mg total) by mouth daily   ferrous sulfate 324 (65 Fe) mg   No No   Sig: TAKE 1 TABLET (324 MG TOTAL) BY MOUTH DAILY BEFORE BREAKFAST   ferrous sulfate 324 (65 Fe) mg   No No   Sig: Take 1 tablet (324 mg total) by mouth daily before breakfast   folic acid (FOLVITE) 1 mg tablet   No No   Sig: Take 1 tablet (1 mg total) by mouth daily   lisinopril (ZESTRIL) 5 mg tablet   No No   Sig: Take 1 tablet (5 mg total) by mouth daily   methotrexate 2.5 MG tablet   No No   Sig: Take 6 tablets (15 mg total) by mouth once a week   ondansetron (ZOFRAN) 4 mg tablet  Self No No   Sig: Take 1 tablet (4 mg total) by mouth every 8 (eight) hours as needed for nausea or vomiting   oxybutynin (DITROPAN) 5 mg tablet  Self No No   Sig: Take 1 tablet (5 mg total) by mouth every 6 (six) hours as needed (bladder spasms)   potassium chloride (MICRO-K) 10 MEQ CR capsule   No No   Sig: TAKE 4 CAPSULES (40 MEQ TOTAL) BY MOUTH 2 (TWO) TIMES  A DAY   tamsulosin (FLOMAX) 0.4 mg  Self No No   Sig: Take 1 capsule (0.4 mg total) by mouth daily with dinner   ursodiol (ACTIGALL) 300 mg capsule   No No   Sig: Take 3 capsules (900 mg total) by mouth in the morning      Facility-Administered Medications: None       Objective :  Temp:  [98.1 °F (36.7 °C)-98.5 °F (36.9 °C)] 98.3 °F (36.8 °C)  HR:  [84-89] 84  BP: ()/(53-61) 97/53  Resp:  [16-18] 16  SpO2:  [97 %-100 %] 99 %  O2 Device: None (Room air)    I/O         12/20 0701 12/21 0700 12/21 0701 12/22 0700 12/22 0701 12/23 0700    P.O.  840     IV Piggyback 100 50     Total Intake 100 890     Urine  0     Emesis/NG output  0     Stool  0     Total Output  0     Net +100 +890            Unmeasured Urine Occurrence 3 x 5 x     Unmeasured Stool Occurrence  0 x           Lines/Drains/Airways       Active Status       Name Placement date Placement time Site Days    Port A Cath 03/21/23 Right Internal jugular 03/21/23  1022  Internal jugular  642                  Physical Exam General appearance: alert and oriented, in no acute distress, appears older than stated age, cooperative, no distress, and pale  Head: Normocephalic, without obvious abnormality, atraumatic  Neck: no JVD and supple, symmetrical, trachea midline  Lungs: diminished breath sounds  Heart: regular rate and rhythm, S1, S2 normal, no murmur, click, rub or gallop  Abdomen: soft, non-tender; bowel sounds normal; no masses,  no organomegaly and ostomy  Extremities: extremities normal, warm and well-perfused; no cyanosis, clubbing, or edema  Pulses: 2+ and symmetric  Neurologic: Grossly normal        Lab Results: I have reviewed the following results:CBC/BMP:   .     12/22/24  0610   WBC 2.68*   HGB 8.0*   HCT 24.0*      SODIUM 138   K 3.7      CO2 24   BUN 30*   CREATININE 1.05   GLUC 88      Recent Labs     12/19/24  2222 12/20/24  0353 12/20/24  0624 12/22/24  0610   WBC 4.15*  --    < > 2.68*   HGB 9.2*  --    < > 8.0*   HCT 28.7*   --    < > 24.0*    191   < > 163   SODIUM 139  --    < > 138   K 5.4* 4.2   < > 3.7   *  --    < > 108   CO2 21  --    < > 24   BUN 37*  --    < > 30*   CREATININE 1.06  --    < > 1.05   GLUC 87  --    < > 88   MG  --  1.7*  --   --    AST 65*  --   --   --    ALT 85*  --   --   --    ALB 4.0  --   --   --    TBILI 0.47  --   --   --    ALKPHOS 142*  --   --   --     < > = values in this interval not displayed.     Lab Results   Component Value Date    COLORU Light Orange 12/19/2024    CLARITYU Extra Turbid 12/19/2024    SPECGRAV 1.018 12/19/2024    PHUR 5.5 12/19/2024    LEUKOCYTESUR Large (A) 12/19/2024    NITRITE Negative 12/19/2024    GLUCOSEU Negative 12/19/2024    KETONESU Negative 12/19/2024    BILIRUBINUR Negative 12/19/2024    BLOODU Large (A) 12/19/2024   ,   Lab Results   Component Value Date    URINECX >100,000 cfu/ml Candida parapsilosis (A) 12/19/2024    URINECX <10,000 cfu/ml 12/19/2024       Imaging Results Review: I reviewed radiology reports from this admission including: CT abdomen/pelvis.      Procedure Component Value Units Date/Time   CT abdomen pelvis with contrast [[id number]] Collected: 12/20/24 0130   Order Status: Completed Updated: 12/20/24 0145   Narrative:     CT ABDOMEN AND PELVIS WITH IV CONTRAST    INDICATION: pelvic pain + UTI + stents - states pain feels same when stents get clogged. .    COMPARISON: CT of the abdomen pelvis November 15, 2024.    TECHNIQUE: CT examination of the abdomen and pelvis was performed. Multiplanar 2D reformatted images were created from the source data.    This examination, like all CT scans performed in the [facility], was performed utilizing techniques to minimize radiation dose exposure, including the use of iterative reconstruction and automated exposure control. Radiation dose length  product (DLP) for this visit: 339 mGy-cm    IV Contrast: 85 mL of iohexol (OMNIPAQUE)  Enteric Contrast: Not  administered.    FINDINGS:    ABDOMEN    LOWER CHEST: Multiple bibasilar nodules are stable compared with prior examination.    LIVER/BILIARY TREE: Unremarkable.    GALLBLADDER: No calcified gallstones. No pericholecystic inflammatory change. Trace gallbladder wall thickening similar to prior exam, nonspecific.    SPLEEN: Unremarkable.    PANCREAS: Unremarkable.    ADRENAL GLANDS: Unremarkable.    KIDNEYS/URETERS: Multiple calculi within the lower pole of the right kidney measuring up to 5 mm. There is a ureteral stent extending from the right renal pelvis to the bladder. A few calculi are seen adjacent to the proximal stent measuring up to 3 mm  (series 601, image 85). There is mild right hydronephrosis. Punctate calculi are seen within the left kidney. There is a left-sided ureteral stent extending from the renal pelvis to the bladder. A 3 mm calculus is seen adjacent to the stent in the distal   ureter (series 302, image 102). There is mild left hydronephrosis. There is periureteral fat stranding bilaterally.    STOMACH AND BOWEL:  There is a right lower quadrant ileostomy. No bowel obstruction.    APPENDIX: No findings to suggest appendicitis.    ABDOMINOPELVIC CAVITY: No ascites. No pneumoperitoneum. No lymphadenopathy.    VESSELS: Unremarkable for patient's age.    PELVIS    REPRODUCTIVE ORGANS: Unremarkable for patient's age.    URINARY BLADDER: Unremarkable.    ABDOMINAL WALL/INGUINAL REGIONS: Unremarkable.    BONES: No acute fracture or suspicious osseous lesion.   Impression:       Bilateral nephrolithiasis. Bilateral ureteral stents in place. There is bilateral mild hydronephrosis. Bilateral periureteral fat stranding, correlate with urinalysis for infection.      Workstation performed: [id number]       Administrative Statements   I have spent a total time of 20 minutes in caring for this patient on the day of the visit/encounter including Diagnostic results, Impressions, Counseling / Coordination of  care, Documenting in the medical record, Reviewing / ordering tests, medicine, procedures  , and Obtaining or reviewing history  .

## 2024-12-22 NOTE — ASSESSMENT & PLAN NOTE
Lab Results   Component Value Date    HGB 8.0 (L) 12/22/2024    HGB 8.0 (L) 12/21/2024    WBC 2.68 (L) 12/22/2024    WBC 2.44 (L) 12/21/2024      Likely secondary to sarcoidosis, methotrexate, radiation  Follows up with heme/onc    Plan   Continue to monitor CBC  Continue folic acid  Follow-up on iron panel

## 2024-12-22 NOTE — ASSESSMENT & PLAN NOTE
Patient reports waxing and waning suprapubic and genital pain since ureteral stent replacement in October  Also reports burning urination that is cloudy and dark orange-colored  History of UTI and patient reports symptoms are similar  Reports 2 weeks of rectal pain with history of hemorrhoids  Denies fevers and chills but endorses occasional nausea  UA revealed innumerable WBC and RBC  CT AP: Multiple calculi seen in both kidneys and adjacent to stents. Bilateral ureteral stents in place. There is bilateral mild hydronephrosis. Bilateral periureteral fat stranding, correlate with urinalysis for infection.   Previous urine culture grew lactobacillus and few gram-positive rods likely contaminant, no susceptibility information available  Did not meet SIRS criteria  Urine culture grows Candida parapsilosis more than 100,000    Pain and discomfort is most likely a result of radiation-induced cystitis rather than infection. F/u with urology for appropriate symptom management.     Plan  Discontinued ceftriaxone on 12/21/2024  It is most likely colonization but can consider fluconazole with caution as she has leukopenia-continue to monitor off antibiotics for now  Will coordinate further recommendations with urology  Trend WBC and fever curve

## 2024-12-22 NOTE — ASSESSMENT & PLAN NOTE
Bilateral ureteral stricture disease, Maintained with bilateral ureteral stents, last exchanged 10/18/24  CT AP: Bilateral nephrolithiasis. Bilateral ureteral stents in place. There is bilateral mild hydronephrosis. Bilateral periureteral fat stranding, correlate with urinalysis for infection.  Urology recommended no current interventions as long as bilateral stents are draining urine    Consulted urology follow-up recommendations

## 2024-12-22 NOTE — ASSESSMENT & PLAN NOTE
Lab Results   Component Value Date    EGFR 59 12/22/2024    EGFR 50 12/21/2024    EGFR 57 12/20/2024    CREATININE 1.05 12/22/2024    CREATININE 1.19 12/21/2024    CREATININE 1.08 12/20/2024     Baseline creatinine 1.08 mg/dL, fluctuates   Secondary to obstructive uropathy in the setting of nephrolithiasis     Plan   Continue to monitor BMP  Avoid nephrotoxin  If creatinine is uptrending tomorrow will consider holding lisinopril

## 2024-12-22 NOTE — PLAN OF CARE
Problem: PAIN - ADULT  Goal: Verbalizes/displays adequate comfort level or baseline comfort level  Description: Interventions:  - Encourage patient to monitor pain and request assistance  - Assess pain using appropriate pain scale  - Administer analgesics based on type and severity of pain and evaluate response  - Implement non-pharmacological measures as appropriate and evaluate response  - Consider cultural and social influences on pain and pain management  - Notify physician/advanced practitioner if interventions unsuccessful or patient reports new pain  Outcome: Progressing     Problem: INFECTION - ADULT  Goal: Absence or prevention of progression during hospitalization  Description: INTERVENTIONS:  - Assess and monitor for signs and symptoms of infection  - Monitor lab/diagnostic results  - Monitor all insertion sites, i.e. indwelling lines, tubes, and drains  - Monitor endotracheal if appropriate and nasal secretions for changes in amount and color  - [place] appropriate cooling/warming therapies per order  - Administer medications as ordered  - Instruct and encourage patient and family to use good hand hygiene technique  - Identify and instruct in appropriate isolation precautions for identified infection/condition  Outcome: Progressing  Goal: Absence of fever/infection during neutropenic period  Description: INTERVENTIONS:  - Monitor WBC    Outcome: Progressing     Problem: SAFETY ADULT  Goal: Patient will remain free of falls  Description: INTERVENTIONS:  - Educate patient/family on patient safety including physical limitations  - Instruct patient to call for assistance with activity   - Consult OT/PT to assist with strengthening/mobility   - Keep Call bell within reach  - Keep bed low and locked with side rails adjusted as appropriate  - Keep care items and personal belongings within reach  - Initiate and maintain comfort rounds  - Make Fall Risk Sign visible to staff  - Offer Toileting e Hours,  inadvance of need  - Initiate/Maintain alarm  - Obtain necessary fall risk management equipment:   - Apply yellow socks and bracelet for high fall risk patients  - Consider moving patient to room near nurses station  Outcome: Progressing  Goal: Maintain or return to baseline ADL function  Description: INTERVENTIONS:  -  Assess patient's ability to carry out ADLs; assess patient's baseline for ADL function and identify physical deficits which impact ability to perform ADLs (bathing, care of mouth/teeth, toileting, grooming, dressing, etc.)  - Assess/evaluate cause of self-care deficits   - Assess range of motion  - Assess patient's mobility; develop plan if impaired  - Assess patient's need for assistive devices and provide as appropriate  - Encourage maximum independence but intervene and supervise when necessary  - Involve family in performance of ADLs  - Assess for home care needs following discharge   - Consider OT consult to assist with ADL evaluation and planning for discharge  - Provide patient education as appropriate  Outcome: Progressing  Goal: Maintains/Returns to pre admission functional level  Description: INTERVENTIONS:  - Perform AM-PAC 6 Click Basic Mobility/ Daily Activity assessment daily.  - Set and communicate daily mobility goal to care team and patient/family/caregiver.   - Collaborate with rehabilitation services on mobility goals if consulted  - Perform Range of Motion  times a day.  - Reposition patient every  hours.  - Dangle patient  times a day  - Stand patient  times a day  - Ambulate patient  times a day  - Out of bed to chair  times a day   - Out of bed for meals  times a day  - Out of bed for toileting  - Record patient progress and toleration of activity level   Outcome: Progressing     Problem: DISCHARGE PLANNING  Goal: Discharge to home or other facility with appropriate resources  Description: INTERVENTIONS:  - Identify barriers to discharge w/patient and caregiver  - Arrange for  needed discharge resources and transportation as appropriate  - Identify discharge learning needs (meds, wound care, etc.)  - Arrange for interpretive services to assist at discharge as needed  - Refer to Case Management Department for coordinating discharge planning if the patient needs post-hospital services based on physician/advanced practitioner order or complex needs related to functional status, cognitive ability, or social support system  Outcome: Progressing     Problem: Knowledge Deficit  Goal: Patient/family/caregiver demonstrates understanding of disease process, treatment plan, medications, and discharge instructions  Description: Complete learning assessment and assess knowledge base.  Interventions:  - Provide teaching at level of understanding  - Provide teaching via preferred learning methods  Outcome: Progressing

## 2024-12-22 NOTE — ASSESSMENT & PLAN NOTE
Lab Results   Component Value Date    K 3.7 12/22/2024    K 4.0 12/21/2024     History of hypokalemia and takes 40 mEq twice daily    Plan  Hold potassium supplements  Continue to monitor BMP

## 2024-12-22 NOTE — ASSESSMENT & PLAN NOTE
Has hemorrhoidal pain  Pain is not controlled enough with hydrocortisone cream alone  As per PCP note May consider phenylephrine cream  Denies bleeding  Will monitor pain and consider pain medication if topical things are not helping  As needed pain medications has been ordered  Patient continues to refuse digital rectal examination and also refused to have only inspection

## 2024-12-22 NOTE — ASSESSMENT & PLAN NOTE
Lab Results   Component Value Date    CALCIUM 10.0 12/22/2024    CALCIUM 9.6 12/21/2024      Likely secondary to sarcoidosis  Nephrolithiasis secondary to hypercalcemia  Better controlled with prednisone but currently off prednisone for ileostomy reversal  Follows nephrology outpatient  CT AP: Multiple calculi present in bilateral kidneys adjacent to ureteral stents.  Calcium improved  Continue to monitor BMP

## 2024-12-22 NOTE — PROGRESS NOTES
Progress Note - Hospitalist   Name: Danielle Haque 59 y.o. female I MRN: [id number]  Unit/Bed#: W -01 I Date of Admission: 12/19/2024   Date of Service: 12/22/2024 I Hospital Day: 2    Assessment & Plan  Abnormal urinalysis  Patient reports waxing and waning suprapubic and genital pain since ureteral stent replacement in October  Also reports burning urination that is cloudy and dark orange-colored  History of UTI and patient reports symptoms are similar  Reports 2 weeks of rectal pain with history of hemorrhoids  Denies fevers and chills but endorses occasional nausea  UA revealed innumerable WBC and RBC  CT AP: Multiple calculi seen in both kidneys and adjacent to stents. Bilateral ureteral stents in place. There is bilateral mild hydronephrosis. Bilateral periureteral fat stranding, correlate with urinalysis for infection.   Previous urine culture grew lactobacillus and few gram-positive rods likely contaminant, no susceptibility information available  Did not meet SIRS criteria  Urine culture grows Candida parapsilosis more than 100,000    Pain and discomfort is most likely a result of radiation-induced cystitis rather than infection. F/u with urology for appropriate symptom management.     Plan  Discontinued ceftriaxone on 12/21/2024  It is most likely colonization but can consider fluconazole with caution as she has leukopenia-continue to monitor off antibiotics for now  Will coordinate further recommendations with urology  Trend WBC and fever curve  Hydronephrosis due to ureteral stricture  Bilateral ureteral stricture disease, Maintained with bilateral ureteral stents, last exchanged 10/18/24  CT AP: Bilateral nephrolithiasis. Bilateral ureteral stents in place. There is bilateral mild hydronephrosis. Bilateral periureteral fat stranding, correlate with urinalysis for infection.  Urology recommended no current interventions as long as bilateral stents are draining urine    Consulted urology follow-up  recommendations    Sarcoidosis  Lung and hepatic involvement, elevated ACE level, NC granulomas on pleural biopsy and liver biopsy   Associated hypercalcemia  Off steroids in preparation for ileostomy reversal  Currently on methotrexate 2.5 mg and lisinopril 5 mg    Plan  Continue PTA methotrexate and lisinopril  If BMP is showing uptrending creatinine tomorrow will consider holding lisinopril  Continue folic acid with methotrexate  Hyperkalemia  Lab Results   Component Value Date    K 3.7 12/22/2024    K 4.0 12/21/2024     History of hypokalemia and takes 40 mEq twice daily    Plan  Hold potassium supplements  Continue to monitor BMP  Hypercalcemia with nephrolithiasis  Lab Results   Component Value Date    CALCIUM 10.0 12/22/2024    CALCIUM 9.6 12/21/2024      Likely secondary to sarcoidosis  Nephrolithiasis secondary to hypercalcemia  Better controlled with prednisone but currently off prednisone for ileostomy reversal  Follows nephrology outpatient  CT AP: Multiple calculi present in bilateral kidneys adjacent to ureteral stents.  Calcium improved  Continue to monitor BMP    Transaminitis  Lab Results   Component Value Date    AST 65 (H) 12/19/2024    AST 45 (H) 12/09/2024    ALT 85 (H) 12/19/2024    ALT 62 (H) 12/09/2024    ALKPHOS 142 (H) 12/19/2024    ALKPHOS 125 (H) 12/09/2024      Hepatic involvement of sarcoidosis  Follows up with gastroenterology  PTA fenofibrate 145 mg and ursodiol 300 mg 3 times daily    Plan  Continue PTA fenofibrate and ursodiol  Monitor CMP  CKD stage G3a/A2, GFR 45-59 and albumin creatinine ratio  mg/g (HCC)  Lab Results   Component Value Date    EGFR 59 12/22/2024    EGFR 50 12/21/2024    EGFR 57 12/20/2024    CREATININE 1.05 12/22/2024    CREATININE 1.19 12/21/2024    CREATININE 1.08 12/20/2024     Baseline creatinine 1.08 mg/dL, fluctuates   Secondary to obstructive uropathy in the setting of nephrolithiasis     Plan   Continue to monitor BMP  Avoid nephrotoxin  If  creatinine is uptrending tomorrow will consider holding lisinopril  History of rectal cancer (HCC)  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   Follows with heme-onc outpatient, per recent visit she does not have clinical, laboratory, or imaging evidence of recurrent or metastatic disease   Pancytopenia (HCC)  Lab Results   Component Value Date    HGB 8.0 (L) 12/22/2024    HGB 8.0 (L) 12/21/2024    WBC 2.68 (L) 12/22/2024    WBC 2.44 (L) 12/21/2024      Likely secondary to sarcoidosis, methotrexate, radiation  Follows up with heme/onc    Plan   Continue to monitor CBC  Continue folic acid  Follow-up on iron panel    Hemorrhoid  Has hemorrhoidal pain  Pain is not controlled enough with hydrocortisone cream alone  As per PCP note May consider phenylephrine cream  Denies bleeding  Will monitor pain and consider pain medication if topical things are not helping  As needed pain medications has been ordered  Patient continues to refuse digital rectal examination and also refused to have only inspection    VTE Pharmacologic Prophylaxis: VTE Score: 4 Moderate Risk (Score 3-4) - Pharmacological DVT Prophylaxis Ordered: heparin.    Mobility:   Basic Mobility Inpatient Raw Score: 24  JH-HLM Goal: 8: Walk 250 feet or more  JH-HLM Achieved: 8: Walk 250 feet ot more  JH-HLM Goal NOT achieved. Continue with multidisciplinary rounding and encourage appropriate mobility to improve upon JH-HLM goals.    Patient Centered Rounds: I performed bedside rounds with nursing staff today.   Discussions with Specialists or Other Care Team Provider: Urology    Education and Discussions with Family / Patient: Updated  (Mateo Haque) via phone.    Current Length of Stay: 2 day(s)  Current Patient Status: Inpatient   Certification Statement: The patient will continue to require additional inpatient hospital stay due to medical optimization    Discharge Plan: Anticipate discharge in 24-48 hrs to home.    Code Status: Level 1 - Full Code    Subjective   Patient seen resting in bed. She states that she does not have any pain as she just got her PRN medications. She also notes relief from her hemorrhoid cream and would like to continue at home at a frequency of post BM.     Objective :  Temp:  [98.1 °F (36.7 °C)-99.2 °F (37.3 °C)] 98.1 °F (36.7 °C)  HR:  [89] 89  BP: (102-129)/(59-86) 116/61  Resp:  [16-18] 16  SpO2:  [97 %-100 %] 100 %  O2 Device: None (Room air)    There is no height or weight on file to calculate BMI.     Input and Output Summary (last 24 hours):     Intake/Output Summary (Last 24 hours) at 12/22/2024 0941  Last data filed at 12/21/2024 1758  Gross per 24 hour   Intake 650 ml   Output 0 ml   Net 650 ml       Physical Exam  Vitals and nursing note reviewed.   Constitutional:       General: She is not in acute distress.     Appearance: She is well-developed.   HENT:      Head: Normocephalic and atraumatic.      Nose: Nose normal.      Mouth/Throat:      Mouth: Mucous membranes are dry.   Eyes:      General: No scleral icterus.        Right eye: No discharge.         Left eye: No discharge.      Conjunctiva/sclera: Conjunctivae normal.   Cardiovascular:      Rate and Rhythm: Normal rate and regular rhythm.      Pulses: Normal pulses.      Heart sounds: Normal heart sounds. No murmur heard.  Pulmonary:      Effort: Pulmonary effort is normal. No respiratory distress.      Breath sounds: Normal breath sounds.   Abdominal:      General: Bowel sounds are normal. There is no distension.      Palpations: Abdomen is soft.      Tenderness: There is no abdominal tenderness.      Comments: Patient reported no suprapubic pain on examination after receiving pain meds  Ileostomy bag present    Musculoskeletal:         General: No swelling.      Cervical back: Neck supple.      Right lower leg: No edema.      Left lower leg: No edema.   Skin:      General: Skin is warm and dry.      Capillary Refill: Capillary refill takes less than 2 seconds.      Coloration: Skin is not jaundiced.   Neurological:      Mental Status: She is alert and oriented to person, place, and time.   Psychiatric:         Mood and Affect: Mood normal.         Behavior: Behavior normal.         Thought Content: Thought content normal.         Judgment: Judgment normal.         Lines/Drains:  Lines/Drains/Airways       Active Status       Name Placement date Placement time Site Days    Port A Cath 03/21/23 Right Internal jugular 03/21/23  1022  Internal jugular  642                    Central Line:  Goal for removal: Will discontinue when meds requiring line are completed.               Lab Results: I have reviewed the following results:   Results from last 7 days   Lab Units 12/22/24  0610 12/21/24  0513   WBC Thousand/uL 2.68* 2.44*   HEMOGLOBIN g/dL 8.0* 8.0*   HEMATOCRIT % 24.0* 24.6*   PLATELETS Thousands/uL 163 171   SEGS PCT %  --  77*   LYMPHO PCT %  --  9*   MONO PCT %  --  5   EOS PCT %  --  9*     Results from last 7 days   Lab Units 12/22/24  0610 12/20/24  0353 12/19/24  2222   SODIUM mmol/L 138   < > 139   POTASSIUM mmol/L 3.7   < > 5.4*   CHLORIDE mmol/L 108   < > 114*   CO2 mmol/L 24   < > 21   BUN mg/dL 30*   < > 37*   CREATININE mg/dL 1.05   < > 1.06   ANION GAP mmol/L 6   < > 4   CALCIUM mg/dL 10.0   < > 11.0*   ALBUMIN g/dL  --   --  4.0   TOTAL BILIRUBIN mg/dL  --   --  0.47   ALK PHOS U/L  --   --  142*   ALT U/L  --   --  85*   AST U/L  --   --  65*   GLUCOSE RANDOM mg/dL 88   < > 87    < > = values in this interval not displayed.                       Recent Cultures (last 7 days):   Results from last 7 days   Lab Units 12/19/24 2012   URINE CULTURE  >100,000 cfu/ml Candida parapsilosis*  <10,000 cfu/ml             Last 24 Hours Medication List:     Current Facility-Administered Medications:     acetaminophen (TYLENOL) tablet 650 mg, Q4H PRN    clotrimazole  (GYNE-LOTRIMIN) 1 % vaginal cream 1 applicator, HS    cyanocobalamin (VITAMIN B-12) tablet 1,000 mcg, Daily    fenofibrate (TRICOR) tablet 145 mg, Daily    ferrous sulfate tablet 325 mg, Daily With Breakfast    folic acid (FOLVITE) tablet 1 mg, Daily    heparin (porcine) subcutaneous injection 5,000 Units, Q8H [place]    hydrocortisone (ANUSOL-HC) 2.5 % rectal cream, BID    HYDROmorphone HCl (DILAUDID) injection 0.2 mg, Q2H PRN    lisinopril (ZESTRIL) tablet 5 mg, Daily    naloxone (NARCAN) 0.04 mg/mL syringe 0.04 mg, Q1MIN PRN    ondansetron (ZOFRAN-ODT) dispersible tablet 4 mg, Q6H PRN    oxybutynin (DITROPAN) tablet 5 mg, Q6H PRN    oxyCODONE (ROXICODONE) split tablet 2.5 mg, Q4H PRN **OR** oxyCODONE (ROXICODONE) IR tablet 5 mg, Q4H PRN    tamsulosin (FLOMAX) capsule 0.4 mg, Daily With Dinner    ursodiol (ACTIGALL) capsule 900 mg, Daily    Administrative Statements   Today, Patient Was Seen By: Pedro Deleon MD      **Please Note: This note may have been constructed using a voice recognition system.**

## 2024-12-22 NOTE — ASSESSMENT & PLAN NOTE
Lab Results   Component Value Date    EGFR 59 12/22/2024    EGFR 50 12/21/2024    EGFR 57 12/20/2024    CREATININE 1.05 12/22/2024    CREATININE 1.19 12/21/2024    CREATININE 1.08 12/20/2024

## 2024-12-23 ENCOUNTER — PATIENT MESSAGE (OUTPATIENT)
Dept: UROLOGY | Facility: CLINIC | Age: 59
End: 2024-12-23

## 2024-12-23 VITALS
SYSTOLIC BLOOD PRESSURE: 95 MMHG | DIASTOLIC BLOOD PRESSURE: 54 MMHG | OXYGEN SATURATION: 99 % | HEART RATE: 91 BPM | TEMPERATURE: 98.5 F | RESPIRATION RATE: 15 BRPM

## 2024-12-23 LAB
ATRIAL RATE: 86 BPM
ERYTHROCYTE [DISTWIDTH] IN BLOOD BY AUTOMATED COUNT: 13.9 % (ref 11.6–15.1)
HCT VFR BLD AUTO: 23 % (ref 34.8–46.1)
HGB BLD-MCNC: 7.5 G/DL (ref 11.5–15.4)
MCH RBC QN AUTO: 30.1 PG (ref 26.8–34.3)
MCHC RBC AUTO-ENTMCNC: 32.6 G/DL (ref 31.4–37.4)
MCV RBC AUTO: 92 FL (ref 82–98)
P AXIS: 77 DEGREES
PLATELET # BLD AUTO: 145 THOUSANDS/UL (ref 149–390)
PMV BLD AUTO: 9 FL (ref 8.9–12.7)
PR INTERVAL: 180 MS
QRS AXIS: 50 DEGREES
QRSD INTERVAL: 82 MS
QT INTERVAL: 346 MS
QTC INTERVAL: 414 MS
RBC # BLD AUTO: 2.49 MILLION/UL (ref 3.81–5.12)
T WAVE AXIS: 59 DEGREES
VENTRICULAR RATE: 86 BPM
WBC # BLD AUTO: 2.35 THOUSAND/UL (ref 4.31–10.16)

## 2024-12-23 PROCEDURE — 93010 ELECTROCARDIOGRAM REPORT: CPT | Performed by: INTERNAL MEDICINE

## 2024-12-23 PROCEDURE — 85027 COMPLETE CBC AUTOMATED: CPT

## 2024-12-23 PROCEDURE — 99238 HOSP IP/OBS DSCHRG MGMT 30/<: CPT | Performed by: INTERNAL MEDICINE

## 2024-12-23 RX ORDER — HYDROCORTISONE 25 MG/G
CREAM TOPICAL 2 TIMES DAILY
Qty: 1 G | Refills: 0 | Status: SHIPPED | OUTPATIENT
Start: 2024-12-23 | End: 2024-12-28

## 2024-12-23 RX ORDER — CLOTRIMAZOLE 1 %
1 CREAM WITH APPLICATOR VAGINAL
Qty: 45 G | Refills: 0 | Status: SHIPPED | OUTPATIENT
Start: 2024-12-23

## 2024-12-23 RX ORDER — OXYCODONE HYDROCHLORIDE 5 MG/1
5 TABLET ORAL EVERY 8 HOURS PRN
Qty: 10 TABLET | Refills: 0 | Status: SHIPPED | OUTPATIENT
Start: 2024-12-23 | End: 2025-01-02

## 2024-12-23 RX ADMIN — OXYCODONE HYDROCHLORIDE 5 MG: 5 TABLET ORAL at 02:31

## 2024-12-23 RX ADMIN — URSODIOL 900 MG: 300 CAPSULE ORAL at 08:23

## 2024-12-23 RX ADMIN — CYANOCOBALAMIN TAB 500 MCG 1000 MCG: 500 TAB at 08:18

## 2024-12-23 RX ADMIN — FERROUS SULFATE TAB 325 MG (65 MG ELEMENTAL FE) 325 MG: 325 (65 FE) TAB at 08:18

## 2024-12-23 RX ADMIN — HYDROCORTISONE: 25 CREAM TOPICAL at 08:28

## 2024-12-23 RX ADMIN — FENOFIBRATE 145 MG: 145 TABLET ORAL at 08:18

## 2024-12-23 RX ADMIN — OXYCODONE HYDROCHLORIDE 5 MG: 5 TABLET ORAL at 13:58

## 2024-12-23 RX ADMIN — FOLIC ACID 1 MG: 1 TABLET ORAL at 08:18

## 2024-12-23 RX ADMIN — OXYCODONE HYDROCHLORIDE 5 MG: 5 TABLET ORAL at 07:23

## 2024-12-23 NOTE — ASSESSMENT & PLAN NOTE
Lab Results   Component Value Date    AST 65 (H) 12/19/2024    AST 45 (H) 12/09/2024    ALT 85 (H) 12/19/2024    ALT 62 (H) 12/09/2024    ALKPHOS 142 (H) 12/19/2024    ALKPHOS 125 (H) 12/09/2024      Hepatic involvement of sarcoidosis  Follows up with gastroenterology  PTA fenofibrate 145 mg and ursodiol 300 mg 3 times daily    Plan  Continue PTA fenofibrate and ursodiol at discharge

## 2024-12-23 NOTE — ASSESSMENT & PLAN NOTE
Anticipated finding in patients with long-term indwelling drains including ureteral stents, nephrostomy tubes, suprapubic tubes and indwelling Kaur catheters.  Consider treatment decisions on constellation of clinical data in conjunction with abnormal urine studies such as presence of fever, chills, worsening leukocytosis, biomarkers suggesting toxicity including lactic acid and procalcitonin levels.    Plan:  Positive yeast on urine culture.  Likely due to immunocompromised status.  Would observe off oral antibiotics or antifungals.  On topical clotrimazole  May administer Pyridium for complaints of dysuria.

## 2024-12-23 NOTE — ASSESSMENT & PLAN NOTE
Lab Results   Component Value Date    EGFR 59 12/22/2024    EGFR 50 12/21/2024    EGFR 57 12/20/2024    CREATININE 1.05 12/22/2024    CREATININE 1.19 12/21/2024    CREATININE 1.08 12/20/2024     Baseline creatinine 1.08 mg/dL, fluctuates. At baseline on discharge.  Secondary to obstructive uropathy in the setting of nephrolithiasis

## 2024-12-23 NOTE — ASSESSMENT & PLAN NOTE
Bilateral ureteral stent dependent  Plan for outpatient trial without left stent.  Will require reconstruction on the right at an interval.  Mild bilateral hydronephrosis despite stent placement.  CT demonstrates good position.  Renal function within normal limits.  Periureteral inflammatory changes on CT--resulting in appearance of mild hydronephrosis.  However low suspicion for obstruction

## 2024-12-23 NOTE — ASSESSMENT & PLAN NOTE
Lung and hepatic involvement, elevated ACE level, NC granulomas on pleural biopsy and liver biopsy   Associated hypercalcemia  Off steroids in preparation for ileostomy reversal  Currently on methotrexate 2.5 mg and lisinopril 5 mg    Plan  Continue PTA methotrexate and lisinopril at discharge

## 2024-12-23 NOTE — DISCHARGE SUMMARY
Discharge Summary - Hospitalist   Name: Danielle Haque 59 y.o. female I MRN: [id number]  Unit/Bed#: W -01 I Date of Admission: 12/19/2024   Date of Service: 12/23/2024 I Hospital Day: 3     Assessment & Plan  Abnormal urinalysis  Patient reports waxing and waning suprapubic and genital pain since ureteral stent replacement in October  Also reports burning urination that is cloudy and dark orange-colored  History of UTI and patient reports symptoms are similar  Reports 2 weeks of rectal pain with history of hemorrhoids  Denies fevers and chills but endorses occasional nausea  UA revealed innumerable WBC and RBC  CT AP: Multiple calculi seen in both kidneys and adjacent to stents. Bilateral ureteral stents in place. There is bilateral mild hydronephrosis. Bilateral periureteral fat stranding, correlate with urinalysis for infection.   Previous urine culture grew lactobacillus and few gram-positive rods likely contaminant, no susceptibility information available  Did not meet SIRS criteria  Urine culture grows Candida parapsilosis more than 100,000    Pain and discomfort is most likely a result of radiation-induced cystitis rather than infection    Plan  Discontinued ceftriaxone on 12/21/2024. No further antibiotics necessary.  Urology recommends topical clomitrazole, pyridium. Will continue at discharge. Will require outpatient urology follow up.  Hydronephrosis due to ureteral stricture  Bilateral ureteral stricture disease, Maintained with bilateral ureteral stents, last exchanged 10/18/24  CT AP: Bilateral nephrolithiasis. Bilateral ureteral stents in place. There is bilateral mild hydronephrosis. Bilateral periureteral fat stranding, correlate with urinalysis for infection.  Urology recommended no current interventions as long as bilateral stents are draining urine    Plan  Urology recommends outpatient follow up    Sarcoidosis  Lung and hepatic involvement, elevated ACE level, NC granulomas on pleural  biopsy and liver biopsy   Associated hypercalcemia  Off steroids in preparation for ileostomy reversal  Currently on methotrexate 2.5 mg and lisinopril 5 mg    Plan  Continue PTA methotrexate and lisinopril at discharge    Hyperkalemia  Lab Results   Component Value Date    K 3.7 12/22/2024    K 4.0 12/21/2024     History of hypokalemia and takes 40 mEq twice daily    Plan  Patient on PTA potassium supplementation which has been held during hospital admission. Discontinue potassium supplementation at discharge.  Hypercalcemia with nephrolithiasis  Lab Results   Component Value Date    CALCIUM 10.0 12/22/2024    CALCIUM 9.6 12/21/2024      Likely secondary to sarcoidosis  Nephrolithiasis secondary to hypercalcemia  Better controlled with prednisone but currently off prednisone for ileostomy reversal  Follows nephrology outpatient  CT AP: Multiple calculi present in bilateral kidneys adjacent to ureteral stents.  Calcium improved      Transaminitis  Lab Results   Component Value Date    AST 65 (H) 12/19/2024    AST 45 (H) 12/09/2024    ALT 85 (H) 12/19/2024    ALT 62 (H) 12/09/2024    ALKPHOS 142 (H) 12/19/2024    ALKPHOS 125 (H) 12/09/2024      Hepatic involvement of sarcoidosis  Follows up with gastroenterology  PTA fenofibrate 145 mg and ursodiol 300 mg 3 times daily    Plan  Continue PTA fenofibrate and ursodiol at discharge  CKD stage G3a/A2, GFR 45-59 and albumin creatinine ratio  mg/g (HCC)  Lab Results   Component Value Date    EGFR 59 12/22/2024    EGFR 50 12/21/2024    EGFR 57 12/20/2024    CREATININE 1.05 12/22/2024    CREATININE 1.19 12/21/2024    CREATININE 1.08 12/20/2024     Baseline creatinine 1.08 mg/dL, fluctuates. At baseline on discharge.  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   Follows with heme-onc  outpatient, per recent visit she does not have clinical, laboratory, or imaging evidence of recurrent or metastatic disease   Pancytopenia (HCC)  Lab Results   Component Value Date    HGB 7.5 (L) 12/23/2024    HGB 8.0 (L) 12/22/2024    WBC 2.35 (L) 12/23/2024    WBC 2.68 (L) 12/22/2024      Likely secondary to sarcoidosis, methotrexate, radiation  Follows up with heme/onc    Hemorrhoid  Patient refused digital rectal examination and also refused to have only inspection  Will continue hydrocortisone rectal cream at discharge     Medical Problems       Resolved Problems  Date Reviewed: 9/26/2024   None       Discharging Physician / Practitioner: Jacki Romero DO  PCP: Leena Anaya MD  Admission Date:   Admission Orders (From admission, onward)       Ordered        12/20/24 0305  INPATIENT ADMISSION  Once                          Discharge Date: 12/23/24    Consultations During Hospital Stay:  Urology    Procedures Performed:   None    Significant Findings / Test Results:   None    Incidental Findings:   None     Test Results Pending at Discharge (will require follow up):   None     Outpatient Tests Requested:  None    Complications:  None    Reason for Admission: Radiation-induced cystitis    Hospital Course:   Danielle Haque is a 59 y.o. female patient who originally presented to the hospital on 12/19/2024 due to abdominal pain associated with genital pain and burning with urination since her last stent replacement on 1018.  Her UA was positive for innumerable WBC and RBC.  CT abdomen pelvis revealed multiple calculi in bilateral kidneys as well as adjacent to stents, bilateral mild hydronephrosis and bilateral periureteral fat stranding. Symptoms likely due to to radiation-induced cystitis.  As urine culture was positive for Candida.  Patient was seen by urology who reports positive yeast on urine culture likely due to immunocompromise state.  Recommended to hold off on oral antibiotics or antifungals.  Patient was  started on topical clotrimazole and will be prescribed at discharge.  Patient was started on Pyridium for dysuria and will continue as outpatient.  Patient will follow-up with urology as outpatient for further evaluation and recommendations.    Please see above list of diagnoses and related plan for additional information.     Condition at Discharge: stable    Discharge Day Visit / Exam:   Subjective:  No overnight events. Patient feels well today. She reports mild dysuria. Otherwise, no complaints.  Vitals: Blood Pressure: 95/54 (12/23/24 0827)  Pulse: 91 (12/23/24 0700)  Temperature: 98.5 °F (36.9 °C) (12/23/24 0700)  Temp Source: Oral (12/23/24 0700)  Respirations: 15 (12/23/24 0700)  SpO2: 99 % (12/22/24 1500)  Physical Exam  Vitals and nursing note reviewed.   Constitutional:       General: She is not in acute distress.     Appearance: She is well-developed.   HENT:      Head: Normocephalic and atraumatic.      Mouth/Throat:      Mouth: Mucous membranes are moist.   Eyes:      General: No scleral icterus.     Conjunctiva/sclera: Conjunctivae normal.   Cardiovascular:      Rate and Rhythm: Normal rate and regular rhythm.      Heart sounds: No murmur heard.     No friction rub. No gallop.   Pulmonary:      Effort: Pulmonary effort is normal. No respiratory distress.      Breath sounds: No wheezing, rhonchi or rales.   Abdominal:      Palpations: Abdomen is soft.      Tenderness: There is no abdominal tenderness.   Musculoskeletal:         General: No swelling.      Cervical back: Neck supple.   Skin:     General: Skin is warm and dry.      Capillary Refill: Capillary refill takes less than 2 seconds.   Neurological:      Mental Status: She is alert.   Psychiatric:         Mood and Affect: Mood normal.         Discussion with Family: Updated  () via phone.    Discharge instructions/Information to patient and family:   See after visit summary for information provided to patient and family.       Provisions for Follow-Up Care:  See after visit summary for information related to follow-up care and any pertinent home health orders.      Mobility at time of Discharge:   Basic Mobility Inpatient Raw Score: 24  JH-HLM Goal: 8: Walk 250 feet or more  JH-HLM Achieved: 8: Walk 250 feet ot more  HLM Goal achieved. Continue to encourage appropriate mobility.     Disposition:   Home    Planned Readmission: None    Discharge Medications:  See after visit summary for reconciled discharge medications provided to patient and/or family.      Administrative Statements   Discharge Statement:  I have spent a total time of 30 minutes in caring for this patient on the day of the visit/encounter. >30 minutes of time was spent on: Impressions, Counseling / Coordination of care, Documenting in the medical record, Reviewing / ordering tests, medicine, procedures  , and Communicating with other healthcare professionals .    **Please Note: This note may have been constructed using a voice recognition system**

## 2024-12-23 NOTE — PATIENT COMMUNICATION
Please review/advise.      ANTHONY Wood       12/22/24  9:59 PM  Note     Danielle Haque  Is a 59-year-old female with history of CRC, bilateral ureteral strictures managed with bilateral stents.  Complex surgical-oncologic history.  Awaiting ileostomy reversal.  Will need plan moving forward?  Left stent removal and trial per Dr. José Luis Stephens and eventual right ureteral reconstruction.  Do not see follow-up appointment on timeline.  Please contact patient with office follow-up in about 6 weeks or so after colorectal surgery.

## 2024-12-23 NOTE — UTILIZATION REVIEW
NOTIFICATION OF INPATIENT ADMISSION   AUTHORIZATION REQUEST   SERVICING FACILITY:   [place] [address]  Tax ID: [id number]  NPI: [id number]   ATTENDING PROVIDER:  Attending Name and NPI#: Janae Morris Md [[id number]]  Address: [address] [place] [address]  Phone: [phone]     ADMISSION INFORMATION:  Place of Service: Inpatient [facility]  Place of Service Code: 21  Inpatient Admission Date/Time: 12/20/24  3:05 AM  Discharge Date/Time: No discharge date for patient encounter.  Admitting Diagnosis Code/Description:  Hydronephrosis [N13.30]  Ureterolithiasis [N20.1]  Urinary tract infection [N39.0]  External hemorrhoid [K64.4]  Ureteral stent present [Z96.0]     UTILIZATION REVIEW CONTACT:  Octavia Tan Utilization   Network Utilization Review Department  Phone: [phone]  Fax: [phone]  Email: [email or web address]  Contact for approvals/pending authorizations, clinical reviews, and discharge.     PHYSICIAN ADVISORY SERVICES:  Medical Necessity Denial & Lfxu-tq-Dwuh Review  Phone: [phone]  Fax: [phone]  Email: [email or web address]     DISCHARGE SUPPORT TEAM:  For Patients Discharge Needs & Updates  Phone: [phone] opt. 2 Fax: [phone]  Email: [email or web address]

## 2024-12-23 NOTE — ASSESSMENT & PLAN NOTE
Patient reports waxing and waning suprapubic and genital pain since ureteral stent replacement in October  Also reports burning urination that is cloudy and dark orange-colored  History of UTI and patient reports symptoms are similar  Reports 2 weeks of rectal pain with history of hemorrhoids  Denies fevers and chills but endorses occasional nausea  UA revealed innumerable WBC and RBC  CT AP: Multiple calculi seen in both kidneys and adjacent to stents. Bilateral ureteral stents in place. There is bilateral mild hydronephrosis. Bilateral periureteral fat stranding, correlate with urinalysis for infection.   Previous urine culture grew lactobacillus and few gram-positive rods likely contaminant, no susceptibility information available  Did not meet SIRS criteria  Urine culture grows Candida parapsilosis more than 100,000    Pain and discomfort is most likely a result of radiation-induced cystitis rather than infection    Plan  Discontinued ceftriaxone on 12/21/2024. No further antibiotics necessary.  Urology recommends topical clomitrazole, pyridium. Will continue at discharge. Will require outpatient urology follow up.

## 2024-12-23 NOTE — ASSESSMENT & PLAN NOTE
Lab Results   Component Value Date    CALCIUM 10.0 12/22/2024    CALCIUM 9.6 12/21/2024      Likely secondary to sarcoidosis  Nephrolithiasis secondary to hypercalcemia  Better controlled with prednisone but currently off prednisone for ileostomy reversal  Follows nephrology outpatient  CT AP: Multiple calculi present in bilateral kidneys adjacent to ureteral stents.  Calcium improved

## 2024-12-23 NOTE — ASSESSMENT & PLAN NOTE
Lab Results   Component Value Date    K 3.7 12/22/2024    K 4.0 12/21/2024     History of hypokalemia and takes 40 mEq twice daily    Plan  Patient on PTA potassium supplementation which has been held during hospital admission. Discontinue potassium supplementation at discharge.

## 2024-12-23 NOTE — ASSESSMENT & PLAN NOTE
Bilateral ureteral stricture disease, Maintained with bilateral ureteral stents, last exchanged 10/18/24  CT AP: Bilateral nephrolithiasis. Bilateral ureteral stents in place. There is bilateral mild hydronephrosis. Bilateral periureteral fat stranding, correlate with urinalysis for infection.  Urology recommended no current interventions as long as bilateral stents are draining urine    Plan  Urology recommends outpatient follow up

## 2024-12-23 NOTE — DISCHARGE INSTR - AVS FIRST PAGE
Dear Danielle Haque,     It was our pleasure to care for you here at [facility].  It is our hope that we were always able to exceed the expected standards for your care during your stay.  You were hospitalized due to cystitis.  You were cared for on the W 4th floor by Jacki Romero DO under the service of Janae Bryan* with the [facility] who covers for your primary care physician (PCP), Leena Anaya MD, while you were hospitalized.  If you have any questions or concerns related to this hospitalization, you may contact us at .  For follow up as well as any medication refills, we recommend that you follow up with your primary care physician.  A registered nurse will reach out to you by phone within a few days after your discharge to answer any additional questions that you may have after going home.  However, at this time we provide for you here, the most important instructions / recommendations at discharge:     Notable Medication Adjustments -   Please stop taking potassium chloride 10 mEq.  You may start using clotrimazole topical cream daily at bedtime.  You may start using hydrocortisone rectal cream twice daily for 3 days.  Testing Required after Discharge -   None  ** Please contact your PCP to request testing orders for any of the testing recommended here **  Important follow up information -   Please follow up with Urology Dr. José Luis Stephens. The office will call you to schedule your appointment.  Please follow up with your PCP within 1 week.  Other Instructions -   Please return to the ED or call 911 with any new or worsening symptoms.  Please review this entire after visit summary as additional general instructions including medication list, appointments, activity, diet, any pertinent wound care, and other additional recommendations from your care team that may be provided for you.      Sincerely,     Jacki Romero  DO

## 2024-12-23 NOTE — PLAN OF CARE
Problem: PAIN - ADULT  Goal: Verbalizes/displays adequate comfort level or baseline comfort level  Description: Interventions:  - Encourage patient to monitor pain and request assistance  - Assess pain using appropriate pain scale  - Administer analgesics based on type and severity of pain and evaluate response  - Implement non-pharmacological measures as appropriate and evaluate response  - Consider cultural and social influences on pain and pain management  - Notify physician/advanced practitioner if interventions unsuccessful or patient reports new pain  Outcome: Progressing     Problem: INFECTION - ADULT  Goal: Absence or prevention of progression during hospitalization  Description: INTERVENTIONS:  - Assess and monitor for signs and symptoms of infection  - Monitor lab/diagnostic results  - Monitor all insertion sites, i.e. indwelling lines, tubes, and drains  - Monitor endotracheal if appropriate and nasal secretions for changes in amount and color  - [place] appropriate cooling/warming therapies per order  - Administer medications as ordered  - Instruct and encourage patient and family to use good hand hygiene technique  - Identify and instruct in appropriate isolation precautions for identified infection/condition  Outcome: Progressing  Goal: Absence of fever/infection during neutropenic period  Description: INTERVENTIONS:  - Monitor WBC    Outcome: Progressing     Problem: SAFETY ADULT  Goal: Patient will remain free of falls  Description: INTERVENTIONS:  - Educate patient/family on patient safety including physical limitations  - Instruct patient to call for assistance with activity   - Consult OT/PT to assist with strengthening/mobility   - Keep Call bell within reach  - Keep bed low and locked with side rails adjusted as appropriate  - Keep care items and personal belongings within reach  - Initiate and maintain comfort rounds  - Make Fall Risk Sign visible to staff  - Offer Toileting e Hours,  inadvance of need  - Initiate/Maintain alarm  - Obtain necessary fall risk management equipment:   - Apply yellow socks and bracelet for high fall risk patients  - Consider moving patient to room near nurses station  Outcome: Progressing  Goal: Maintain or return to baseline ADL function  Description: INTERVENTIONS:  -  Assess patient's ability to carry out ADLs; assess patient's baseline for ADL function and identify physical deficits which impact ability to perform ADLs (bathing, care of mouth/teeth, toileting, grooming, dressing, etc.)  - Assess/evaluate cause of self-care deficits   - Assess range of motion  - Assess patient's mobility; develop plan if impaired  - Assess patient's need for assistive devices and provide as appropriate  - Encourage maximum independence but intervene and supervise when necessary  - Involve family in performance of ADLs  - Assess for home care needs following discharge   - Consider OT consult to assist with ADL evaluation and planning for discharge  - Provide patient education as appropriate  Outcome: Progressing  Goal: Maintains/Returns to pre admission functional level  Description: INTERVENTIONS:  - Perform AM-PAC 6 Click Basic Mobility/ Daily Activity assessment daily.  - Set and communicate daily mobility goal to care team and patient/family/caregiver.   - Collaborate with rehabilitation services on mobility goals if consulted  - Perform Range of Motion  times a day.  - Reposition patient every  hours.  - Dangle patient  times a day  - Stand patient  times a day  - Ambulate patient  times a day  - Out of bed to chair  times a day   - Out of bed for meals  times a day  - Out of bed for toileting  - Record patient progress and toleration of activity level   Outcome: Progressing     Problem: DISCHARGE PLANNING  Goal: Discharge to home or other facility with appropriate resources  Description: INTERVENTIONS:  - Identify barriers to discharge w/patient and caregiver  - Arrange for  needed discharge resources and transportation as appropriate  - Identify discharge learning needs (meds, wound care, etc.)  - Arrange for interpretive services to assist at discharge as needed  - Refer to Case Management Department for coordinating discharge planning if the patient needs post-hospital services based on physician/advanced practitioner order or complex needs related to functional status, cognitive ability, or social support system  Outcome: Progressing     Problem: Knowledge Deficit  Goal: Patient/family/caregiver demonstrates understanding of disease process, treatment plan, medications, and discharge instructions  Description: Complete learning assessment and assess knowledge base.  Interventions:  - Provide teaching at level of understanding  - Provide teaching via preferred learning methods  Outcome: Progressing

## 2024-12-23 NOTE — UTILIZATION REVIEW
Initial Clinical Review    Admission: Date/Time/Statement:   Admission Orders (From admission, onward)       Ordered        12/20/24 0305  INPATIENT ADMISSION  Once                          Orders Placed This Encounter   Procedures    INPATIENT ADMISSION     Standing Status:   Standing     Number of Occurrences:   1     Level of Care:   Med Surg [16]     Estimated length of stay:   More than 2 Midnights     Certification:   I certify that inpatient services are medically necessary for this patient for a duration of greater than two midnights. See H&P and MD Progress Notes for additional information about the patient's course of treatment.     ED Arrival Information       Expected   -    Arrival   12/19/2024 19:53    Acuity   Urgent              Means of arrival   Walk-In    Escorted by   Family Member    Service   Hospitalist    Admission type   Emergency              Arrival complaint   Hemorrhoids             Chief Complaint   Patient presents with    Hemorrhoids     Pt has hemorrhoids that she states are getting bigger and more painful. Pt has ileostomy.     Pelvic Pain     Pt also has genital pain, pt has kidney stents, when pain is bad she gets them replaced which helps her pain. Pain with urination, cloudy, bloody, and malodorous. Took oxycodone at 6 with minimal relief.        Initial Presentation: 59 y.o. female with hx sarcoidosis, rectal cancer status post diverting colostomy, and chemoradiation therapy, hypercalcemia due to sarcoidosis, bilateral ureteral stent placement who presents to ED from home  12/19 with burning urination with abdominal and genital pain.  She reports waxing and waning abdominal and genital pain since her last stent replacement on 10/18. Pt reports cloudy and dark orange-colored urine. complains of rectal pain for the past 2 weeks secondary to hemorrhoids . Reporting occasional nausea. Patient has been noticing increasing mucus discharge from the rectal area, patient has been  noticed to have prolapsed hemorrhoid. Declines rectal exam in the ED. .On exam, suprapubic tenderness. RLQ colostomy .  Labs K 5.4 , Ca 11, WBC 4.15. UA large leuks , innumerable WBC, RBC . CT A/P shows multiple calculi and both kidneys as well as adjacent to stents. Bilateral ureteral stents in place. There is bilateral mild hydronephrosis and bilateral periureteral fat stranding  .  Urology said no role for intervention as long as stents are draining. Pt given IV analgesic, Iv abx in ED. Admitted as Inpatient 12/20 with possible UTI. Hydronephrosis. Plan- IV ceftriaxone . F/U uriencx . Trend WBC, fever curve . Monitor BMP .    Update 12/20- sarcoidosis- methotrexate and prednisone had been discontinued in anticipation for reversal of colostomy. Hypercalcemia has improved, monitor calcium level. WBC down to 3.43 . Labs in am .     Date: 12/21   Day 2:    WBC 2.44 . Urine culture + Candida parapsilosis > 100,000 .  D/C'ed ceftriaxone today . Consider fluconazole with caution as she has leukopenia . Pt c/o hemorrhoidal pain , not controlled enough with hydrocortisone cream alone consider phenylephrine cream . Pt denies hemorrhoidales bleeding. Patient refused to have digital rectal examination and also refused to have only inspection . AM labs :BMP, CBC, TIBC panel, ferritin, iron panel .     ED Treatment-Medication Administration from 12/19/2024 1953 to 12/20/2024 1900         Date/Time Order Dose Route Action     12/19/2024 2223 HYDROmorphone (DILAUDID) injection 0.5 mg 0.5 mg Intravenous Given     12/19/2024 2352 lidocaine (URO-JET) 2 % urethral/mucosal gel 1 Application 1 Application Urethral Given     12/20/2024 0034 ceftriaxone (ROCEPHIN) 1 g/50 mL in dextrose IVPB 1,000 mg Intravenous New Bag     12/20/2024 0110 iohexol (OMNIPAQUE) 350 MG/ML injection (MULTI-DOSE) 100 mL 85 mL Intravenous Given     12/20/2024 1002 cyanocobalamin (VITAMIN B-12) tablet 1,000 mcg 1,000 mcg Oral Given     12/20/2024 1002  fenofibrate (TRICOR) tablet 145 mg 145 mg Oral Given     12/20/2024 0856 ferrous sulfate tablet 325 mg 325 mg Oral Given     12/20/2024 0959 folic acid (FOLVITE) tablet 1 mg 1 mg Oral Given     12/20/2024 1726 tamsulosin (FLOMAX) capsule 0.4 mg 0.4 mg Oral Given     12/20/2024 1000 ursodiol (ACTIGALL) capsule 900 mg 900 mg Oral Given     12/20/2024 0626 heparin (porcine) subcutaneous injection 5,000 Units 5,000 Units Subcutaneous Given     12/20/2024 1000 oxyCODONE (ROXICODONE) split tablet 2.5 mg -- Oral See Alternative     12/20/2024 1000 oxyCODONE (ROXICODONE) IR tablet 5 mg 5 mg Oral Given     12/20/2024 0547 HYDROmorphone HCl (DILAUDID) injection 0.2 mg 0.2 mg Intravenous Given     12/20/2024 1008 magnesium sulfate 4 g/100 mL IVPB (premix) 4 g 4 g Intravenous New Bag     12/20/2024 1324 hydrocortisone (ANUSOL-HC) 2.5 % rectal cream -- Topical Given     12/20/2024 1724 hydrocortisone (ANUSOL-HC) 2.5 % rectal cream -- Topical Given            Scheduled Medications:  clotrimazole, 1 applicator, Vaginal, HS  cyanocobalamin, 1,000 mcg, Oral, Daily  fenofibrate, 145 mg, Oral, Daily  ferrous sulfate, 325 mg, Oral, Daily With Breakfast  folic acid, 1 mg, Oral, Daily  heparin (porcine), 5,000 Units, Subcutaneous, Q8H STANLEY  hydrocortisone, , Topical, BID  lisinopril, 5 mg, Oral, Daily  tamsulosin, 0.4 mg, Oral, Daily With Dinner  ursodiol, 900 mg, Oral, Daily    ceftriaxone (ROCEPHIN) 1 g/50 mL in dextrose IVPB  Dose: 1,000 mg  Freq: Every 24 hours Route: IV  Last Dose: 1,000 mg (12/21/24 0123)  Start: 12/21/24 0000 End: 12/21/24 1619  Continuous IV Infusions:     PRN Meds:  acetaminophen, 650 mg, Oral, Q4H PRN  HYDROmorphone, 0.2 mg, Intravenous, Q2H PRN  naloxone, 0.04 mg, Intravenous, Q1MIN PRN  ondansetron, 4 mg, Oral, Q6H PRN  oxybutynin, 5 mg, Oral, Q6H PRN  oxyCODONE, 2.5 mg, Oral, Q4H PRN   Or  oxyCODONE, 5 mg, Oral, Q4H PRN x1 12/20, x2  12/21      ED Triage Vitals [12/19/24 1957]   Temperature Pulse Respirations  Blood Pressure SpO2 Pain Score   97.5 °F (36.4 °C) 103 18 118/80 99 % 8     Weight (last 2 days)       None            Vital Signs (last 3 days)       Date/Time Temp Pulse Resp BP MAP (mmHg) SpO2 O2 Device Patient Position - Orthostatic VS [place] Coma Scale Score Pain    12/23/24 0231 -- -- -- -- -- -- -- -- -- 8    12/22/24 2248 -- -- -- -- -- -- -- -- -- 8    12/22/24 2000 -- -- -- -- -- -- -- -- 15 No Pain    12/22/24 1713 -- -- -- -- -- -- -- -- -- 3    12/22/24 1500 98.3 °F (36.8 °C) 84 -- 97/53 -- 99 % -- Lying -- --    12/22/24 1139 -- -- -- -- -- -- -- -- -- 5    12/22/24 0800 -- -- -- -- -- -- None (Room air) -- 15 --    12/22/24 0746 98.1 °F (36.7 °C) 89 16 116/61 75 100 % None (Room air) Sitting -- --    12/22/24 0615 -- -- -- -- -- -- -- -- -- 7    12/22/24 0007 98.5 °F (36.9 °C) 89 18 102/59 75 97 % -- Sitting -- 8    12/21/24 2000 -- -- -- -- -- -- -- -- 15 No Pain    12/21/24 1749 -- -- -- -- -- -- -- -- -- 8    12/21/24 1500 99.2 °F (37.3 °C) -- 18 129/86 88 99 % -- Lying -- --    12/21/24 1218 -- -- -- -- -- -- -- -- -- 6    12/21/24 1100 -- -- -- -- -- -- -- -- -- 6    12/21/24 0810 -- -- -- -- -- -- -- -- 15 --    12/21/24 0809 -- -- -- 101/67 -- -- -- -- -- --    12/21/24 0335 -- -- -- -- -- -- -- -- -- 9    12/20/24 2334 98.4 °F (36.9 °C) 88 16 91/54 70 96 % -- -- -- --    12/20/24 2057 -- -- -- -- -- -- -- -- -- 7    12/20/24 2000 -- -- -- -- -- -- -- -- 15 No Pain    12/20/24 19:09:03 -- 93 -- 120/70 87 100 % -- -- -- --    12/20/24 1850 -- 98 16 143/68 -- 95 % None (Room air) -- -- --    12/20/24 1800 -- 87 16 120/67 87 98 % None (Room air) -- -- --    12/20/24 1500 -- 80 16 105/57 74 96 % None (Room air) -- -- --    12/20/24 1200 -- 88 -- 97/53 69 96 % -- -- -- --    12/20/24 1003 98.5 °F (36.9 °C) 100 16 107/58 -- 96 % None (Room air) -- -- --    12/20/24 1000 -- 99 -- 107/58 79 96 % -- -- -- --    12/20/24 0959 -- -- -- -- -- -- -- -- -- 7    12/20/24 0730 -- -- -- -- -- -- -- -- 15 --     12/20/24 0547 -- -- -- -- -- -- -- -- -- 7    12/20/24 0500 -- 91 -- 104/56 75 95 % -- -- -- --    12/20/24 0330 -- 91 -- 110/57 78 96 % -- -- -- --    12/20/24 0130 -- 96 -- 104/58 76 97 % -- -- -- --    12/20/24 0037 -- -- -- -- -- -- -- -- 15 5              Pertinent Labs/Diagnostic Test Results:   Radiology:  CT abdomen pelvis with contrast   Final Interpretation by Matheus Silva MD (12/20 0143)      Bilateral nephrolithiasis. Bilateral ureteral stents in place. There is bilateral mild hydronephrosis. Bilateral periureteral fat stranding, correlate with urinalysis for infection.         Workstation performed: [id number]                   Results from last 7 days   Lab Units 12/23/24  0613 12/22/24  0610 12/21/24  0513 12/20/24 0624 12/20/24  0353 12/19/24  2222   WBC Thousand/uL 2.35* 2.68* 2.44* 3.43*  --  4.15*   HEMOGLOBIN g/dL 7.5* 8.0* 8.0* 8.5*  --  9.2*   HEMATOCRIT % 23.0* 24.0* 24.6* 26.5*  --  28.7*   PLATELETS Thousands/uL 145* 163 171 187   < > 205   TOTAL NEUT ABS Thousands/µL  --   --  1.87 2.83  --  3.33    < > = values in this interval not displayed.         Results from last 7 days   Lab Units 12/22/24  0610 12/21/24  0513 12/20/24  0624 12/20/24  0353 12/19/24  2222   SODIUM mmol/L 138 138 139  --  139   POTASSIUM mmol/L 3.7 4.0 3.6 4.2 5.4*   CHLORIDE mmol/L 108 108 110*  --  114*   CO2 mmol/L 24 24 20*  --  21   ANION GAP mmol/L 6 6 9  --  4   BUN mg/dL 30* 29* 33*  --  37*   CREATININE mg/dL 1.05 1.19 1.08  --  1.06   EGFR ml/min/1.73sq m 59 50 57  --  58   CALCIUM mg/dL 10.0 9.6 10.2  --  11.0*   MAGNESIUM mg/dL  --   --   --  1.7*  --      Results from last 7 days   Lab Units 12/19/24  2222   AST U/L 65*   ALT U/L 85*   ALK PHOS U/L 142*   TOTAL PROTEIN g/dL 7.0   ALBUMIN g/dL 4.0   TOTAL BILIRUBIN mg/dL 0.47         Results from last 7 days   Lab Units 12/22/24  0610 12/21/24  0513 12/20/24  0624 12/19/24  2222   GLUCOSE RANDOM mg/dL 88 88 164* 87         Results from last 7 days   Lab  Units 12/21/24  0513   TRANSFERRIN mg/dL 241             Results from last 7 days   Lab Units 12/19/24  2222   LIPASE u/L 39                 Results from last 7 days   Lab Units 12/19/24 2012   CLARITY UA  Extra Turbid   COLOR UA  Light Orange   SPEC GRAV UA  1.018   PH UA  5.5   GLUCOSE UA mg/dl Negative   KETONES UA mg/dl Negative   BLOOD UA  Large*   PROTEIN UA mg/dl 100 (2+)*   NITRITE UA  Negative   BILIRUBIN UA  Negative   UROBILINOGEN UA (BE) mg/dl <2.0   LEUKOCYTES UA  Large*   WBC UA /hpf Innumerable*   RBC UA /hpf Innumerable*   BACTERIA UA /hpf None Seen   EPITHELIAL CELLS WET PREP /hpf Occasional   MUCUS THREADS  Moderate*             Results from last 7 days   Lab Units 12/19/24 2012   URINE CULTURE  >100,000 cfu/ml Candida parapsilosis*  <10,000 cfu/ml                   Past Medical History:   Diagnosis Date    Bleeding from colostomy stoma (HCC) 2/11/2023     states ileostomy , not colostomy    Cancer (HCC)     Colon cancer (HCC)     Elevated serum creatinine 09/11/2024    Fall     Headache     Hemochromatosis, unspecified     History of transfusion     2022 - no adverse reaction    Hypertension     Hypokalemia 09/12/2024    Kidney calculi     Kidney stone     Liver disease     hemangioma    Muscle weakness     Personal history of COVID-19 12/2022    Sarcoidosis     Seizures (HCC)     2022    Shingles      Present on Admission:   Sarcoidosis   Transaminitis   Hydronephrosis due to ureteral stricture   History of rectal cancer (HCC)   Abnormal urinalysis   Hypercalcemia with nephrolithiasis   CKD stage G3a/A2, GFR 45-59 and albumin creatinine ratio  mg/g (HCC)   Pancytopenia (HCC)      Admitting Diagnosis: Hydronephrosis [N13.30]  Ureterolithiasis [N20.1]  Urinary tract infection [N39.0]  External hemorrhoid [K64.4]  Ureteral stent present [Z96.0]  Age/Sex: 59 y.o. female    Network Utilization Review Department  ATTENTION: Please call with any questions or concerns to [phone] and  carefully listen to the prompts so that you are directed to the right person. All voicemails are confidential.   For Discharge needs, contact Care Management DC Support Team at [phone] opt. 2  Send all requests for admission clinical reviews, approved or denied determinations and any other requests to dedicated fax number below belonging to the campus where the patient is receiving treatment. List of dedicated fax numbers for the Facilities:  FACILITY NAME UR FAX NUMBER   ADMISSION DENIALS (Administrative/Medical Necessity) [phone]   DISCHARGE SUPPORT TEAM (NETWORK) [phone]   PARENT CHILD HEALTH (Maternity/NICU/Pediatrics) [phone]   [facility] [phone]   [facility] [phone]   [facility] [phone]   [facility] [phone]   Critical access hospital [phone]   [facility] [phone]   [facility] [phone]   [facility] [phone]   [facility] [phone]   [facility] [phone]   [facility] [phone]   [facility] [phone]

## 2024-12-23 NOTE — ASSESSMENT & PLAN NOTE
Bilateral nonobstructing intrarenal calculi--obstruction mitigated via ureteral stents.  No indication for urgent stone treatment.

## 2024-12-23 NOTE — ASSESSMENT & PLAN NOTE
Patient refused digital rectal examination and also refused to have only inspection  Will continue hydrocortisone rectal cream at discharge

## 2024-12-23 NOTE — ASSESSMENT & PLAN NOTE
Lab Results   Component Value Date    HGB 7.5 (L) 12/23/2024    HGB 8.0 (L) 12/22/2024    WBC 2.35 (L) 12/23/2024    WBC 2.68 (L) 12/22/2024      Likely secondary to sarcoidosis, methotrexate, radiation  Follows up with heme/onc

## 2024-12-24 ENCOUNTER — TELEPHONE (OUTPATIENT)
Dept: PALLIATIVE MEDICINE | Facility: CLINIC | Age: 59
End: 2024-12-24

## 2024-12-24 ENCOUNTER — TRANSITIONAL CARE MANAGEMENT (OUTPATIENT)
Dept: INTERNAL MEDICINE CLINIC | Facility: CLINIC | Age: 59
End: 2024-12-24

## 2024-12-24 DIAGNOSIS — Z51.5 PALLIATIVE CARE PATIENT: Primary | ICD-10-CM

## 2024-12-24 DIAGNOSIS — C18.7 MALIGNANT NEOPLASM OF SIGMOID COLON (HCC): ICD-10-CM

## 2024-12-24 RX ORDER — ACETAMINOPHEN 500 MG
500 TABLET ORAL EVERY 6 HOURS PRN
Qty: 90 TABLET | Refills: 0 | Status: SHIPPED | OUTPATIENT
Start: 2024-12-24

## 2024-12-24 NOTE — UTILIZATION REVIEW
NOTIFICATION OF ADMISSION DISCHARGE   This is a Notification of Discharge from [facility]. Please be advised that this patient has been discharge from our facility. Below you will find the admission and discharge date and time including the patient’s disposition.   UTILIZATION REVIEW CONTACT:  Octavia Tan  Utilization   Network Utilization Review Department  Phone: [phone] x carefully listen to the prompts. All voicemails are confidential.  Email: [email or web address]     ADMISSION INFORMATION  PRESENTATION DATE: 12/19/2024  9:19 PM  OBERVATION ADMISSION DATE: N/A  INPATIENT ADMISSION DATE: 12/20/24  3:05 AM   DISCHARGE DATE: 12/23/2024  5:04 PM   DISPOSITION:Home/Self Care    Network Utilization Review Department  ATTENTION: Please call with any questions or concerns to [phone] and carefully listen to the prompts so that you are directed to the right person. All voicemails are confidential.   For Discharge needs, contact Care Management DC Support Team at [phone] opt. 2  Send all requests for admission clinical reviews, approved or denied determinations and any other requests to dedicated fax number below belonging to the campus where the patient is receiving treatment. List of dedicated fax numbers for the Facilities:  FACILITY NAME UR FAX NUMBER   ADMISSION DENIALS (Administrative/Medical Necessity) [phone]   DISCHARGE SUPPORT TEAM ([facility]) [phone]   PARENT CHILD HEALTH (Maternity/NICU/Pediatrics) [phone]   [facility] [phone]   [facility] [phone]   [facility] [phone]   [facility] [phone]   [facility] [phone]   [facility] [phone]   [facility] [phone]   [facility] [phone]    [facility] [phone]   [facility] [phone]   [facility] [phone]   [facility] [phone]

## 2024-12-26 ENCOUNTER — CONSULT (OUTPATIENT)
Dept: ENDOCRINOLOGY | Facility: CLINIC | Age: 59
End: 2024-12-26
Payer: COMMERCIAL

## 2024-12-26 VITALS
HEART RATE: 102 BPM | OXYGEN SATURATION: 95 % | SYSTOLIC BLOOD PRESSURE: 98 MMHG | BODY MASS INDEX: 21.42 KG/M2 | HEIGHT: 62 IN | WEIGHT: 116.4 LBS | DIASTOLIC BLOOD PRESSURE: 60 MMHG

## 2024-12-26 DIAGNOSIS — Q89.1 ADRENAL GLAND ANOMALY: ICD-10-CM

## 2024-12-26 DIAGNOSIS — D35.01 ADENOMA OF RIGHT ADRENAL GLAND: Primary | ICD-10-CM

## 2024-12-26 PROCEDURE — 99244 OFF/OP CNSLTJ NEW/EST MOD 40: CPT | Performed by: INTERNAL MEDICINE

## 2024-12-26 NOTE — PROGRESS NOTES
Danielle Haque 59 y.o. female MRN: [id number]    Encounter: [id number]      Assessment & Plan     Assessment:  This is a 59 y.o.-year-old female with right adrenal nodule .    Plan:  Diagnoses and all orders for this visit:    Adenoma of right adrenal gland  Patient had a workup for pheochromocytoma as well as for primary hyperaldosteronism in 2022 at [place].  Workup was within normal range.  Will repeat 24-hour urine fractionated catecholamines and metanephrines to rule out pheochromocytoma  On imaging, this nodule appears to be stable and suggestive of adrenal adenoma  Will also review with radiology CT scan findings as recently CT scan of abdomen did not show any adrenal pathology.  Will obtain aldosterone renin ratio  Patient gives a history of potation supplementation use which was stopped on recent discharge from hospital.  Discussed that she should have repeat basic metabolic profile checked by PCP make sure her potassium is okay  -     Creatinine, urine, 24 hour; Future  -     Catecholamines, fractionated, urine, 24 hour; Future  -     Metanephrines Fractionated, urine, 24 hour; Future  -     Aldosterone/Renin Ratio; Future  -     Metanephrines Fractionated, urine, 24 hour; Future  -     Creatinine, urine, 24 hour; Future    Adrenal gland anomaly  Most recent CT scan of abdomen did not show any adrenal lesion.  -     Ambulatory Referral to Endocrinology         CC:   Right adrenal nodule     History of Present Illness     HPI:    Danielle Haque is 59-year-old woman with medical history of right adrenal nodule which is stable on imaging is referred here for evaluation of adrenal nodule..  Patient was found to have right adrenal nodule on CT scan of abdomen in 2022.  MRI of abdomen in March 2023 showed 6 mm right adrenal nodule.  This demonstrates signal dropout from in to  out of phase T1-weighted sequences therefore it is an adrenal adenoma  CT scan of abdomen in 2024 in December did not show any adrenal  pathology.    She denies episodes of sweating, palpitations, anxiety or nervousness attacks, syncopal or presyncopal episodes, pounding headaches    Review of Systems   Constitutional:  Negative for activity change, diaphoresis, fatigue, fever and unexpected weight change.   HENT: Negative.     Eyes:  Negative for visual disturbance.   Respiratory:  Negative for cough, chest tightness and shortness of breath.    Cardiovascular:  Negative for chest pain, palpitations and leg swelling.   Gastrointestinal:  Negative for abdominal pain, blood in stool, constipation, diarrhea, nausea and vomiting.   Endocrine: Negative for cold intolerance, heat intolerance, polydipsia, polyphagia and polyuria.   Genitourinary:  Negative for dysuria, enuresis, frequency and urgency.   Musculoskeletal:  Negative for arthralgias and myalgias.   Skin:  Negative for pallor, rash and wound.   Allergic/Immunologic: Negative.    Neurological:  Negative for dizziness, tremors, weakness and numbness.   Hematological: Negative.    Psychiatric/Behavioral: Negative.         Historical Information   Past Medical History:   Diagnosis Date    Bleeding from colostomy stoma (HCC) 2/11/2023     states ileostomy , not colostomy    Cancer (HCC)     Colon cancer (HCC)     Elevated serum creatinine 09/11/2024    Fall     Headache     Hemochromatosis, unspecified     History of transfusion     2022 - no adverse reaction    Hypertension     Hypokalemia 09/12/2024    Kidney calculi     Kidney stone     Liver disease     hemangioma    Muscle weakness     Personal history of COVID-19 12/2022    Sarcoidosis     Seizures (HCC)     2022    Shingles      Past Surgical History:   Procedure Laterality Date    ABSCESS DRAINAGE      left breast    BREAST BIOPSY      CHEST TUBE INSERTION      COLON SURGERY      colostomy    COLONOSCOPY      FL GUIDED CENTRAL VENOUS ACCESS DEVICE INSERTION  03/21/2023    FL RETROGRADE PYELOGRAM  01/23/2023    FL RETROGRADE PYELOGRAM   04/03/2023    FL RETROGRADE PYELOGRAM  7/21/2023    FL RETROGRADE PYELOGRAM  10/11/2023    FL RETROGRADE PYELOGRAM  12/15/2023    FL RETROGRADE PYELOGRAM  2/8/2024    FL RETROGRADE PYELOGRAM  5/10/2024    FL RETROGRADE PYELOGRAM  9/26/2024    FL RETROGRADE PYELOGRAM  10/18/2024    IR BIOPSY LIVER MASS  05/09/2023    LUNG BIOPSY      IL CYSTO BLADDER W/URETERAL CATHETERIZATION Bilateral 07/21/2023    Procedure: CYSTOSCOPY URETEROSCOPY RETROGRADE PYELOGRAM WITH BILATERAL STENT URETERAL EXCHANGE; LEFT LASER LITHOTRIPSY AND STONE BASKET RETRIEVAL;  Surgeon: José Luis Stephens MD;  [facility];  Service: Urology    IL CYSTO BLADDER W/URETERAL CATHETERIZATION Bilateral 12/15/2023    Procedure: CYSTOSCOPY, BILATERAL  RETROGRADE PYELOGRAM , STENT EXCHANGE RIGHT , LEFT URETEROSCOPY ,  HOLMIUM LASER WITH INSERTION LEFT URETERAL STENT;  Surgeon: Derick Bhakta MD;  [facility];  Service: Urology    IL CYSTO BLADDER W/URETERAL CATHETERIZATION Left 2/8/2024    Procedure: CYSTOSCOPY B/L RETROGRADE PYELOGRAM WITH B/L T URETERALSTENT EXCHANGE,LEFT URETEROSCOPY, DILATION LEFT URETERAL STICTURE;  Surgeon: José Luis Stephens MD;  [facility];  Service: Urology    IL CYSTO BLADDER W/URETERAL CATHETERIZATION Bilateral 5/10/2024    Procedure: CYSTOSCOPY RETROGRADE PYELOGRAM WITH INSERTION STENT URETERAL;  Surgeon: José Luis Stephens MD;  [facility];  Service: Urology    IL CYSTO W/INSERT URETERAL STENT Bilateral 5/10/2024    Procedure: EXCHANGE STENT URETERAL;  Surgeon: José Luis Stephens MD;  [facility];  Service: Urology    IL CYSTO W/INSERT URETERAL STENT Bilateral 10/18/2024    Procedure: EXCHANGE STENT URETERAL;  Surgeon: José Luis Stephens MD;  [facility];  Service: Urology    IL CYSTO/URETERO W/LITHOTRIPSY &INDWELL STENT INSRT Bilateral 01/23/2023    Procedure: CYSTOSCOPY  RIGHT URETEROSCOPY WITH LITHOTRIPSY HOLMIUM LASER, BILATERAL RETROGRADE  PYELOGRAM AND BILATERAL  URETERAL STENT INSERTION;  Surgeon: José Luis Stephens MD;  [facility];  Service: Urology    ND CYSTO/URETERO W/LITHOTRIPSY &INDWELL STENT INSRT Right 04/03/2023    Procedure: RIGHT URETEROSCOPY WITH LITHOTRIPSY HOLMIUM LASER, RETROGRADE PYELOGRAM; BILATERAL EXCHANGE STENT URETERAL;  Surgeon: José Luis Stephens MD;  [facility];  Service: Urology    ND CYSTO/URETERO W/LITHOTRIPSY &INDWELL STENT INSRT Bilateral 10/11/2023    Procedure: CYSTOSCOPY URETEROSCOPY RETROGRADE PYELOGRAM AND INSERTION STENT URETERAL DIAGNOSTINC RIGHT URETEROSCOPY, REMOVAL STENT LEFT SIDE;  Surgeon: José Luis Stephens MD;  [facility];  Service: Urology    ND CYSTO/URETERO W/LITHOTRIPSY &INDWELL STENT INSRT Bilateral 9/26/2024    Procedure: CYSTOSCOPY ,LEFT  URETEROSCOPY,  BILATERAL  RETROGRADE PYELOGRAM AND BILATERAL URETERAL STENT EXCHANGE;  Surgeon: Favian Barber MD;  [facility];  Service: Urology    ND CYSTO/URETERO W/LITHOTRIPSY &INDWELL STENT INSRT Left 10/18/2024    Procedure: CYSTOSCOPY LEFT URETEROSCOPY, treatment of LEFT ureteral stricture, RETROGRADE PYELOGRAM AND BILATERAL STENT EXCHANGE;  Surgeon: José Luis Stephens MD;  [facility];  Service: Urology    ND INSJ TUNNELED CTR VAD W/SUBQ PORT AGE 5 YR/> N/A 03/21/2023    Procedure: INSERTION VENOUS PORT ( PORT-A-CATH) IR;  Surgeon: Mark Amos DO;  [facility];  Service: Interventional Radiology    ND LAPS COLECTOMY PRTL W/COLOPXTSTMY LW ANAST N/A 8/17/2023    Procedure: RESECTION COLON LOW ANTERIOR LAPAROSCOPIC WITH ROBOTICS;  Surgeon: Sree Umanzor MD;  [facility];  Service: Colorectal    TONSILLECTOMY      URINARY SURGERY Bilateral     bilateral stents     Social History   Social History     Substance and Sexual Activity   Alcohol Use Never     Social History     Substance and Sexual Activity   Drug Use Never     Social History     Tobacco Use   Smoking  Status Never    Passive exposure: Past   Smokeless Tobacco Never     Family History:   Family History   Problem Relation Age of Onset    Cancer Mother     Cirrhosis Father     Diabetes Father     Breast cancer Neg Hx     Breast cancer additional onset Neg Hx        Meds/Allergies   Current Outpatient Medications   Medication Sig Dispense Refill    acetaminophen (TYLENOL) 500 mg tablet Take 1 tablet (500 mg total) by mouth every 6 (six) hours as needed for mild pain 90 tablet 0    clotrimazole (GYNE-LOTRIMIN) 1 % vaginal cream Insert 1 applicator into the vagina daily at bedtime 45 g 0    cyanocobalamin (VITAMIN B-12) 1000 MCG tablet Take 1,000 mcg by mouth daily      estrogens, conjugated (Premarin) vaginal cream Insert 0.3 g into the vagina 3 (three) times a week 30 g 6    fenofibrate (TRICOR) 145 mg tablet Take 1 tablet (145 mg total) by mouth daily 90 tablet 3    ferrous sulfate 324 (65 Fe) mg TAKE 1 TABLET (324 MG TOTAL) BY MOUTH DAILY BEFORE BREAKFAST 30 tablet 0    folic acid (FOLVITE) 1 mg tablet Take 1 tablet (1 mg total) by mouth daily 30 tablet 5    hydrocortisone (ANUSOL-HC) 2.5 % rectal cream Apply topically 2 (two) times a day for 5 days 1 g 0    lisinopril (ZESTRIL) 5 mg tablet Take 1 tablet (5 mg total) by mouth daily 90 tablet 1    methotrexate 2.5 MG tablet Take 6 tablets (15 mg total) by mouth once a week 72 tablet 2    ondansetron (ZOFRAN) 4 mg tablet Take 1 tablet (4 mg total) by mouth every 8 (eight) hours as needed for nausea or vomiting 20 tablet 2    oxybutynin (DITROPAN) 5 mg tablet Take 1 tablet (5 mg total) by mouth every 6 (six) hours as needed (bladder spasms) 30 tablet 0    oxyCODONE (Roxicodone) 5 immediate release tablet Take 1 tablet (5 mg total) by mouth every 8 (eight) hours as needed for moderate pain for up to 10 days Max Daily Amount: 15 mg 10 tablet 0    tamsulosin (FLOMAX) 0.4 mg Take 1 capsule (0.4 mg total) by mouth daily with dinner 90 capsule 3    ursodiol (ACTIGALL) 300  "mg capsule Take 3 capsules (900 mg total) by mouth in the morning 270 capsule 0    docusate sodium (COLACE) 100 mg capsule Take 1 capsule (100 mg total) by mouth 2 (two) times a day for 15 days 30 capsule 0     No current facility-administered medications for this visit.     Allergies   Allergen Reactions    Oxaliplatin Shortness Of Breath     Reactions occurred with 2nd and 3rd infusions (about 1-3 hours from initiation of infusion) and required treatment with steroids and antihistamines. Please refer to allergy note on 2/7/2023 for detailed description of her reactions.    Morphine Nausea Only     \"Every dose made me nauseous\" (oral dosing only, can tolerate IV morphine)    Potassium Chloride Other (See Comments)     Pt reports \"burning\" with Iv potassium administration and wishes for it to be added to chart       Objective   Vitals: Blood pressure 98/60, pulse 102, height 5' 2\" (1.575 m), weight 52.8 kg (116 lb 6.4 oz), SpO2 95%.    Physical Exam  Vitals reviewed.   Constitutional:       General: She is not in acute distress.     Appearance: Normal appearance. She is not ill-appearing.   HENT:      Head: Normocephalic and atraumatic.      Nose: Nose normal.   Eyes:      Extraocular Movements: Extraocular movements intact.      Conjunctiva/sclera: Conjunctivae normal.   Pulmonary:      Effort: No respiratory distress.   Musculoskeletal:      Cervical back: Normal range of motion.   Neurological:      General: No focal deficit present.      Mental Status: She is alert and oriented to person, place, and time.   Psychiatric:         Mood and Affect: Mood normal.         Behavior: Behavior normal.         The history was obtained from the review of the chart, patient.    Lab Results:   Lab Results   Component Value Date/Time    TSH 3RD GENERATON 1.063 09/13/2024 06:30 AM    Free T4 1.08 09/13/2024 06:30 AM       Imaging Studies:   Results for orders placed during the hospital encounter of 03/25/23    US " "thyroid    Impression  Bilateral thyroid nodules, largest nodule in the right lobe corresponds to the finding on CT.  No nodule meets current ACR criteria for requiring biopsy.  1 year follow-up is recommended.        Reference: ACR Thyroid Imaging, Reporting and Data System (TI-RADS): White Paper of the ACR TI-RADS Committee. J AM Tk Radiol 2017;14:587-595. (additional recommendations based on American Thyroid Association 2015 guidelines.)      Workstation performed: [id number]      Results Review Statement: No pertinent imaging studies reviewed.    Portions of the record may have been created with voice recognition software. Occasional wrong word or \"sound a like\" substitutions may have occurred due to the inherent limitations of voice recognition software. Read the chart carefully and recognize, using context, where substitutions have occurred.    "

## 2024-12-30 ENCOUNTER — OFFICE VISIT (OUTPATIENT)
Dept: PALLIATIVE MEDICINE | Facility: CLINIC | Age: 59
End: 2024-12-30
Payer: COMMERCIAL

## 2024-12-30 VITALS
DIASTOLIC BLOOD PRESSURE: 62 MMHG | OXYGEN SATURATION: 98 % | TEMPERATURE: 97.3 F | BODY MASS INDEX: 21.35 KG/M2 | HEART RATE: 124 BPM | HEIGHT: 62 IN | WEIGHT: 116 LBS | SYSTOLIC BLOOD PRESSURE: 100 MMHG

## 2024-12-30 DIAGNOSIS — N94.9 PAIN OF FEMALE GENITALIA: ICD-10-CM

## 2024-12-30 DIAGNOSIS — G89.3 CANCER RELATED PAIN: Primary | ICD-10-CM

## 2024-12-30 DIAGNOSIS — C18.7 MALIGNANT NEOPLASM OF SIGMOID COLON (HCC): ICD-10-CM

## 2024-12-30 DIAGNOSIS — K64.9 HEMORRHOIDS, UNSPECIFIED HEMORRHOID TYPE: ICD-10-CM

## 2024-12-30 DIAGNOSIS — Z51.5 PALLIATIVE CARE PATIENT: ICD-10-CM

## 2024-12-30 PROCEDURE — 99214 OFFICE O/P EST MOD 30 MIN: CPT | Performed by: STUDENT IN AN ORGANIZED HEALTH CARE EDUCATION/TRAINING PROGRAM

## 2024-12-30 NOTE — PROGRESS NOTES
Name: Danielle Haque      : 1965      MRN: [id number]  Encounter Provider: Leena Anaya MD  Encounter Date: 2024   [facility]  :  Assessment & Plan  External hemorrhoid    Orders:    hydrocortisone (ANUSOL-HC) 2.5 % rectal cream; Apply topically 2 (two) times a day for 5 days    Hyperkalemia  Potassium was stopped in hospital.  Recheck potassium level.    Orders:    Basic metabolic panel; Future    Pancytopenia (HCC)  Ordered repeat lab.  Follow-up with rheumatology and hematology.    Orders:    CBC and differential; Future    Encounter for screening for malignant neoplasm of breast, unspecified screening modality    Orders:    US breast follow up right (no charge); Future    Hemorrhoids, unspecified hemorrhoid type  Currently she uses topical steroid and topical phenylephrine   has follow-up with colorectal surgery next month.             TCM Call       Date and time call was made  2024 10:54 AM    Hospital care reviewed  Records not available    Patient was hospitial[facility] at  [facility]    Date of Admission  24    Date of discharge  24    Diagnosis  Abnormal urinalysis    Disposition  Home    Were the patients medications reviewed and updated  No    Current Symptoms  None    Weakness severity  Severe          TCM Call       Post hospital issues  None    Should patient be enrolled in anticoag monitoring?  No    Scheduled for follow up?  Yes    Did you obtain your prescribed medications  Yes    Do you need help managing your prescriptions or medications  No    Is transportation to your appointment needed  No    I have advised the patient to call PCP with any new or worsening symptoms  Hilton Bedoya - MR/MA    Living Arrangements  Friends    Are you recieving any outpatient services  No    Are you recieving home care services  No    Types of home care services  Nurse visit    Are you using any community resources  No    Have you  fallen in the last 12 months  No    How many times  Once    Interperter language line needed  No                 History of Present Illness     HPI  TCM  Suprapubic and rectal pain  Thought likely radiation cystitis prolapsed hemorrhoid.   Was given abx for UTI.   Also treated for cnadida with topical antifungal  Avoid further oral abx or antifungal,  pancytopenia (likely 2/2 starting methotrexate). Started pyridium. Short course oxy for pain.     Review of Systems    Objective   /86 (BP Location: Left arm, Patient Position: Sitting, Cuff Size: Standard)   Pulse 83   Temp (!) 96.8 °F (36 °C) (Tympanic)   Wt 51.7 kg (114 lb)   SpO2 98%   BMI 20.85 kg/m²      Physical Exam

## 2024-12-31 ENCOUNTER — OFFICE VISIT (OUTPATIENT)
Dept: INTERNAL MEDICINE CLINIC | Facility: CLINIC | Age: 59
End: 2024-12-31
Payer: COMMERCIAL

## 2024-12-31 VITALS
HEART RATE: 83 BPM | OXYGEN SATURATION: 98 % | BODY MASS INDEX: 20.85 KG/M2 | TEMPERATURE: 96.8 F | WEIGHT: 114 LBS | DIASTOLIC BLOOD PRESSURE: 86 MMHG | SYSTOLIC BLOOD PRESSURE: 110 MMHG

## 2024-12-31 DIAGNOSIS — K64.4 EXTERNAL HEMORRHOID: ICD-10-CM

## 2024-12-31 DIAGNOSIS — D50.9 IRON DEFICIENCY ANEMIA, UNSPECIFIED IRON DEFICIENCY ANEMIA TYPE: ICD-10-CM

## 2024-12-31 DIAGNOSIS — K64.9 HEMORRHOIDS, UNSPECIFIED HEMORRHOID TYPE: ICD-10-CM

## 2024-12-31 DIAGNOSIS — Z12.39 ENCOUNTER FOR SCREENING FOR MALIGNANT NEOPLASM OF BREAST, UNSPECIFIED SCREENING MODALITY: ICD-10-CM

## 2024-12-31 DIAGNOSIS — E87.5 HYPERKALEMIA: Primary | ICD-10-CM

## 2024-12-31 DIAGNOSIS — D61.818 PANCYTOPENIA (HCC): ICD-10-CM

## 2024-12-31 PROCEDURE — 99496 TRANSJ CARE MGMT HIGH F2F 7D: CPT | Performed by: GENERAL ACUTE CARE HOSPITAL

## 2024-12-31 RX ORDER — HYDROCORTISONE 25 MG/G
CREAM TOPICAL 2 TIMES DAILY
Qty: 28 G | Refills: 0 | Status: SHIPPED | OUTPATIENT
Start: 2024-12-31 | End: 2025-01-05

## 2024-12-31 NOTE — PATIENT INSTRUCTIONS
It was a pleasure speaking with you today.    As discussed:  -Continue your medications as prescribed.  -Continue with a bowel regimen to avoid constipation.    Follow-up in: 4 weeks    Please do not hesitate to call me sooner should you have any questions/concerns or needs.    Medication safety issues - Do not drive under the influence of narcotics (including opioids), watch for adverse effects including confusion / altered mental status / respiratory depression (slowed breathing), keep medications stored in a safe/locked environment, do not use alcohol while opioids or other narcotics are in your system. Do not travel with more than the minimum number of tablets or capsules required for the trip.    ER Precautions  Watch for red flag symptoms including, but not limited to fevers, chills, chest pain, shortness of breath, intractable nausea/vomiting/diarrhea, or acute intractable pain (especially if pain is new or has changed).

## 2024-12-31 NOTE — ASSESSMENT & PLAN NOTE
Patient without rectal pain other than from her hemorrhoids that were recently diagnosis. She finds improvement with hydrocortisone rectal cream to be effective.    She does use the Oxycodone 5mg typically 3 times a day to help with her pain that is predominantly from her genitalia due to urinary pain.  Her previous rectal pain is not as much as an issue as her urinary pain and from hemorrhoids.    Patient has follow-up with her Surgeon for ongoing evaluation and whether a colostomy reversal would be in the plans soon.  Patient also under surveillance with Medical Oncology (Dr. Pandya) with follow-up in the coming months with repeat imaging.    Discussed with patient that since she is BYRON status per last Oncology note, I am hoping we can try to taper her off of the opioids as her pain appears to be more related to her urological issues. If this is more of a chronic pain, we discussed potential plans for setting patient up with a Chronic Pain Specialist with a referral. She and family are understanding of this potential.    At this time, given the patient's discomfort, we will continue with the current regimen in the interim as she has follow-ups planned with her Colorectal Surgeon and Oncologist.    Plan  1) Continue Oxycodone 5mg q8 hours PRN  2) Multi-modals to continue with APAP. Patient is taking 500 mg q6 hours PRN which has been helpful for her.    Medication safety issues - Do not drive under the influence of narcotics (including opioids), watch for adverse effects including confusion / altered mental status / respiratory depression (slowed breathing), keep medications stored in a safe/locked environment, do not use alcohol while opioids or other narcotics are in your system. Do not travel with more than the minimum number of tablets or capsules required for the trip.    ER Precautions  Watch for red flag symptoms including, but not limited to fevers, chills, chest pain, shortness of breath, intractable  nausea/vomiting/diarrhea, or acute intractable pain (especially if pain is new or has changed).

## 2024-12-31 NOTE — ASSESSMENT & PLAN NOTE
Patient with diagnosis of hemorrhoids which have also contributed to her rectal discomfort for which she felt the Anusol rectal cream was effective in providing relief.

## 2024-12-31 NOTE — ASSESSMENT & PLAN NOTE
Ordered repeat lab.  Follow-up with rheumatology and hematology.    Orders:    CBC and differential; Future

## 2024-12-31 NOTE — PROGRESS NOTES
Name: Danielle Haque      : 1965      MRN: [id number]  Encounter Provider: Jhon Banks DO  Encounter Date: 2024   Encounter department: [facility]  :  Assessment & Plan  Cancer related pain  Patient without rectal pain other than from her hemorrhoids that were recently diagnosis. She finds improvement with hydrocortisone rectal cream to be effective.    She does use the Oxycodone 5mg typically 3 times a day to help with her pain that is predominantly from her genitalia due to urinary pain.  Her previous rectal pain is not as much as an issue as her urinary pain and from hemorrhoids.    Patient has follow-up with her Surgeon for ongoing evaluation and whether a colostomy reversal would be in the plans soon.  Patient also under surveillance with Medical Oncology (Dr. Pandya) with follow-up in the coming months with repeat imaging.    Discussed with patient that since she is BYRON status per last Oncology note, I am hoping we can try to taper her off of the opioids as her pain appears to be more related to her urological issues. If this is more of a chronic pain, we discussed potential plans for setting patient up with a Chronic Pain Specialist with a referral. She and family are understanding of this potential.    At this time, given the patient's discomfort, we will continue with the current regimen in the interim as she has follow-ups planned with her Colorectal Surgeon and Oncologist.    Plan  1) Continue Oxycodone 5mg q8 hours PRN  2) Multi-modals to continue with APAP. Patient is taking 500 mg q6 hours PRN which has been helpful for her.    Medication safety issues - Do not drive under the influence of narcotics (including opioids), watch for adverse effects including confusion / altered mental status / respiratory depression (slowed breathing), keep medications stored in a safe/locked environment, do not use alcohol while opioids or other narcotics are in your system. Do not  travel with more than the minimum number of tablets or capsules required for the trip.    ER Precautions  Watch for red flag symptoms including, but not limited to fevers, chills, chest pain, shortness of breath, intractable nausea/vomiting/diarrhea, or acute intractable pain (especially if pain is new or has changed).           Pain of female genitalia  Patient endorses pain is typically with urination (chronic) and does endorse positional discomfort as well.         Malignant neoplasm of sigmoid colon (HCC)         Hemorrhoids, unspecified hemorrhoid type  Patient with diagnosis of hemorrhoids which have also contributed to her rectal discomfort for which she felt the Anusol rectal cream was effective in providing relief.         Palliative care patient  Psychosocial   Supportive listening provided  Normalized experience of patient/family  Provided anxiety containment     Referrals Placed / Medical Equipment Ordered  -None today    Follow-Up Recommendations  -Follow-up with PCP and current medical specialists  -Follow-up with palliative care: 4 weeks for recheck             PDMP Review: I have reviewed the patient's controlled substance dispensing history in the Prescription Drug Monitoring Program in compliance with the [facility] regulations before prescribing any controlled substances.    History of Present Illness   Danielle Haque is a 59 y.o. female who presents for follow-up.    Palliative Diagnosis - History of rectal cancer (stage IIA), s/p colostomy pending reversal    Patient is seen today in office. Alert, oriented, pleasantly conversant.  Patient is seen accompanied by her significant other.    Appetite has been fair. She is tolerating PO intake without issue. She is working on improving her appetite and eating as able/desired.    Pain ongoing particularly from her hemorrhoids in which she finds comfort with Anusol rectal cream. Pain is predominantly from a Urological standpoint as she states she has genital  pain during urination which has been chronic. She does follow her Urology specialist for ongoing care.    Patient is well supported by her family.    Patient was tearful during exam given her long history of medical issues along with her urological issues. She voiced her frustrations and anxiety containment as well as support was provided today. No thoughts of self harm, harm to others or SI.    Medical History Reviewed by provider this encounter:     .  Past Medical History   Past Medical History:   Diagnosis Date    Bleeding from colostomy stoma (HCC) 2/11/2023     states ileostomy , not colostomy    Cancer (HCC)     Colon cancer (HCC)     Elevated serum creatinine 09/11/2024    Fall     Headache     Hemochromatosis, unspecified     History of transfusion     2022 - no adverse reaction    Hypertension     Hypokalemia 09/12/2024    Kidney calculi     Kidney stone     Liver disease     hemangioma    Muscle weakness     Personal history of COVID-19 12/2022    Sarcoidosis     Seizures (HCC)     2022    Shingles      Past Surgical History:   Procedure Laterality Date    ABSCESS DRAINAGE      left breast    BREAST BIOPSY      CHEST TUBE INSERTION      COLON SURGERY      colostomy    COLONOSCOPY      FL GUIDED CENTRAL VENOUS ACCESS DEVICE INSERTION  03/21/2023    FL RETROGRADE PYELOGRAM  01/23/2023    FL RETROGRADE PYELOGRAM  04/03/2023    FL RETROGRADE PYELOGRAM  7/21/2023    FL RETROGRADE PYELOGRAM  10/11/2023    FL RETROGRADE PYELOGRAM  12/15/2023    FL RETROGRADE PYELOGRAM  2/8/2024    FL RETROGRADE PYELOGRAM  5/10/2024    FL RETROGRADE PYELOGRAM  9/26/2024    FL RETROGRADE PYELOGRAM  10/18/2024    IR BIOPSY LIVER MASS  05/09/2023    LUNG BIOPSY      ID CYSTO BLADDER W/URETERAL CATHETERIZATION Bilateral 07/21/2023    Procedure: CYSTOSCOPY URETEROSCOPY RETROGRADE PYELOGRAM WITH BILATERAL STENT URETERAL EXCHANGE; LEFT LASER LITHOTRIPSY AND STONE BASKET RETRIEVAL;  Surgeon: José Luis Stephens MD;  [facility];  Service: Urology    CT CYSTO BLADDER W/URETERAL CATHETERIZATION Bilateral 12/15/2023    Procedure: CYSTOSCOPY, BILATERAL  RETROGRADE PYELOGRAM , STENT EXCHANGE RIGHT , LEFT URETEROSCOPY ,  HOLMIUM LASER WITH INSERTION LEFT URETERAL STENT;  Surgeon: Derick Bhakta MD;  [facility];  Service: Urology    CT CYSTO BLADDER W/URETERAL CATHETERIZATION Left 2/8/2024    Procedure: CYSTOSCOPY B/L RETROGRADE PYELOGRAM WITH B/L T URETERALSTENT EXCHANGE,LEFT URETEROSCOPY, DILATION LEFT URETERAL STICTURE;  Surgeon: José Luis Stephens MD;  [facility];  Service: Urology    CT CYSTO BLADDER W/URETERAL CATHETERIZATION Bilateral 5/10/2024    Procedure: CYSTOSCOPY RETROGRADE PYELOGRAM WITH INSERTION STENT URETERAL;  Surgeon: José Luis Stephens MD;  [facility];  Service: Urology    CT CYSTO W/INSERT URETERAL STENT Bilateral 5/10/2024    Procedure: EXCHANGE STENT URETERAL;  Surgeon: José Luis Stephens MD;  [facility];  Service: Urology    CT CYSTO W/INSERT URETERAL STENT Bilateral 10/18/2024    Procedure: EXCHANGE STENT URETERAL;  Surgeon: José Luis Stephens MD;  [facility];  Service: Urology    CT CYSTO/URETERO W/LITHOTRIPSY &INDWELL STENT INSRT Bilateral 01/23/2023    Procedure: CYSTOSCOPY  RIGHT URETEROSCOPY WITH LITHOTRIPSY HOLMIUM LASER, BILATERAL RETROGRADE PYELOGRAM AND BILATERAL  URETERAL STENT INSERTION;  Surgeon: José Luis Stephens MD;  [facility];  Service: Urology    CT CYSTO/URETERO W/LITHOTRIPSY &INDWELL STENT INSRT Right 04/03/2023    Procedure: RIGHT URETEROSCOPY WITH LITHOTRIPSY HOLMIUM LASER, RETROGRADE PYELOGRAM; BILATERAL EXCHANGE STENT URETERAL;  Surgeon: José Luis Stephens MD;  [facility];  Service: Urology    CT CYSTO/URETERO W/LITHOTRIPSY &INDWELL STENT INSRT Bilateral 10/11/2023    Procedure: CYSTOSCOPY URETEROSCOPY RETROGRADE PYELOGRAM AND INSERTION STENT URETERAL DIAGNOSTINC RIGHT URETEROSCOPY, REMOVAL  STENT LEFT SIDE;  Surgeon: José Luis Stephens MD;  [facility];  Service: Urology    SC CYSTO/URETERO W/LITHOTRIPSY &INDWELL STENT INSRT Bilateral 9/26/2024    Procedure: CYSTOSCOPY ,LEFT  URETEROSCOPY,  BILATERAL  RETROGRADE PYELOGRAM AND BILATERAL URETERAL STENT EXCHANGE;  Surgeon: Favian Barber MD;  [facility];  Service: Urology    SC CYSTO/URETERO W/LITHOTRIPSY &INDWELL STENT INSRT Left 10/18/2024    Procedure: CYSTOSCOPY LEFT URETEROSCOPY, treatment of LEFT ureteral stricture, RETROGRADE PYELOGRAM AND BILATERAL STENT EXCHANGE;  Surgeon: José Luis Stephens MD;  [facility];  Service: Urology    SC INSJ TUNNELED CTR VAD W/SUBQ PORT AGE 5 YR/> N/A 03/21/2023    Procedure: INSERTION VENOUS PORT ( PORT-A-CATH) IR;  Surgeon: Mark Amos DO;  [facility];  Service: Interventional Radiology    SC LAPS COLECTOMY PRTL W/COLOPXTSTMY LW ANAST N/A 8/17/2023    Procedure: RESECTION COLON LOW ANTERIOR LAPAROSCOPIC WITH ROBOTICS;  Surgeon: Sree Umanzor MD;  [facility];  Service: Colorectal    TONSILLECTOMY      URINARY SURGERY Bilateral     bilateral stents     Family History   Problem Relation Age of Onset    Cancer Mother     Cirrhosis Father     Diabetes Father     Breast cancer Neg Hx     Breast cancer additional onset Neg Hx       reports that she has never smoked. She has been exposed to tobacco smoke. She has never used smokeless tobacco. She reports that she does not drink alcohol and does not use drugs.  Current Outpatient Medications on File Prior to Visit   Medication Sig Dispense Refill    acetaminophen (TYLENOL) 500 mg tablet Take 1 tablet (500 mg total) by mouth every 6 (six) hours as needed for mild pain 90 tablet 0    clotrimazole (GYNE-LOTRIMIN) 1 % vaginal cream Insert 1 applicator into the vagina daily at bedtime 45 g 0    cyanocobalamin (VITAMIN B-12) 1000 MCG tablet Take 1,000 mcg by mouth daily      estrogens, conjugated  "(Premarin) vaginal cream Insert 0.3 g into the vagina 3 (three) times a week 30 g 6    fenofibrate (TRICOR) 145 mg tablet Take 1 tablet (145 mg total) by mouth daily 90 tablet 3    ferrous sulfate 324 (65 Fe) mg TAKE 1 TABLET (324 MG TOTAL) BY MOUTH DAILY BEFORE BREAKFAST 30 tablet 0    folic acid (FOLVITE) 1 mg tablet Take 1 tablet (1 mg total) by mouth daily 30 tablet 5    lisinopril (ZESTRIL) 5 mg tablet Take 1 tablet (5 mg total) by mouth daily 90 tablet 1    methotrexate 2.5 MG tablet Take 6 tablets (15 mg total) by mouth once a week 72 tablet 2    ondansetron (ZOFRAN) 4 mg tablet Take 1 tablet (4 mg total) by mouth every 8 (eight) hours as needed for nausea or vomiting 20 tablet 2    oxybutynin (DITROPAN) 5 mg tablet Take 1 tablet (5 mg total) by mouth every 6 (six) hours as needed (bladder spasms) 30 tablet 0    oxyCODONE (Roxicodone) 5 immediate release tablet Take 1 tablet (5 mg total) by mouth every 8 (eight) hours as needed for moderate pain for up to 10 days Max Daily Amount: 15 mg 10 tablet 0    tamsulosin (FLOMAX) 0.4 mg Take 1 capsule (0.4 mg total) by mouth daily with dinner 90 capsule 3    ursodiol (ACTIGALL) 300 mg capsule Take 3 capsules (900 mg total) by mouth in the morning 270 capsule 0    docusate sodium (COLACE) 100 mg capsule Take 1 capsule (100 mg total) by mouth 2 (two) times a day for 15 days 30 capsule 0    hydrocortisone (ANUSOL-HC) 2.5 % rectal cream Apply topically 2 (two) times a day for 5 days 1 g 0     No current facility-administered medications on file prior to visit.     Allergies   Allergen Reactions    Oxaliplatin Shortness Of Breath     Reactions occurred with 2nd and 3rd infusions (about 1-3 hours from initiation of infusion) and required treatment with steroids and antihistamines. Please refer to allergy note on 2/7/2023 for detailed description of her reactions.    Morphine Nausea Only     \"Every dose made me nauseous\" (oral dosing only, can tolerate IV morphine)    " "Potassium Chloride Other (See Comments)     Pt reports \"burning\" with Iv potassium administration and wishes for it to be added to chart      Current Outpatient Medications on File Prior to Visit   Medication Sig Dispense Refill    acetaminophen (TYLENOL) 500 mg tablet Take 1 tablet (500 mg total) by mouth every 6 (six) hours as needed for mild pain 90 tablet 0    clotrimazole (GYNE-LOTRIMIN) 1 % vaginal cream Insert 1 applicator into the vagina daily at bedtime 45 g 0    cyanocobalamin (VITAMIN B-12) 1000 MCG tablet Take 1,000 mcg by mouth daily      estrogens, conjugated (Premarin) vaginal cream Insert 0.3 g into the vagina 3 (three) times a week 30 g 6    fenofibrate (TRICOR) 145 mg tablet Take 1 tablet (145 mg total) by mouth daily 90 tablet 3    ferrous sulfate 324 (65 Fe) mg TAKE 1 TABLET (324 MG TOTAL) BY MOUTH DAILY BEFORE BREAKFAST 30 tablet 0    folic acid (FOLVITE) 1 mg tablet Take 1 tablet (1 mg total) by mouth daily 30 tablet 5    lisinopril (ZESTRIL) 5 mg tablet Take 1 tablet (5 mg total) by mouth daily 90 tablet 1    methotrexate 2.5 MG tablet Take 6 tablets (15 mg total) by mouth once a week 72 tablet 2    ondansetron (ZOFRAN) 4 mg tablet Take 1 tablet (4 mg total) by mouth every 8 (eight) hours as needed for nausea or vomiting 20 tablet 2    oxybutynin (DITROPAN) 5 mg tablet Take 1 tablet (5 mg total) by mouth every 6 (six) hours as needed (bladder spasms) 30 tablet 0    oxyCODONE (Roxicodone) 5 immediate release tablet Take 1 tablet (5 mg total) by mouth every 8 (eight) hours as needed for moderate pain for up to 10 days Max Daily Amount: 15 mg 10 tablet 0    tamsulosin (FLOMAX) 0.4 mg Take 1 capsule (0.4 mg total) by mouth daily with dinner 90 capsule 3    ursodiol (ACTIGALL) 300 mg capsule Take 3 capsules (900 mg total) by mouth in the morning 270 capsule 0    docusate sodium (COLACE) 100 mg capsule Take 1 capsule (100 mg total) by mouth 2 (two) times a day for 15 days 30 capsule 0    " "hydrocortisone (ANUSOL-HC) 2.5 % rectal cream Apply topically 2 (two) times a day for 5 days 1 g 0     No current facility-administered medications on file prior to visit.      Social History     Tobacco Use    Smoking status: Never     Passive exposure: Past    Smokeless tobacco: Never   Vaping Use    Vaping status: Never Used   Substance and Sexual Activity    Alcohol use: Never    Drug use: Never    Sexual activity: Not Currently     Partners: Male     Birth control/protection: Abstinence, Post-menopausal        Objective   /62 (BP Location: Left arm, Patient Position: Sitting, Cuff Size: Standard)   Pulse (!) 124   Temp (!) 97.3 °F (36.3 °C) (Temporal)   Ht 5' 2\" (1.575 m)   Wt 52.6 kg (116 lb)   SpO2 98%   BMI 21.22 kg/m²     Physical Exam  Vitals reviewed.   Constitutional:       General: She is not in acute distress.     Appearance: She is not ill-appearing, toxic-appearing or diaphoretic.   HENT:      Head: Normocephalic and atraumatic.      Nose: Nose normal.      Mouth/Throat:      Mouth: Mucous membranes are moist.   Eyes:      General:         Right eye: No discharge.         Left eye: No discharge.   Cardiovascular:      Rate and Rhythm: Normal rate.      Pulses: Normal pulses.      Comments: Heart rate rechecked manually via left radial pulse palpation by this provider with findings of 96 bpm, pulse with regular rhythm.  Pulmonary:      Effort: Pulmonary effort is normal. No respiratory distress.      Breath sounds: No wheezing.   Abdominal:      General: Abdomen is flat. There is no distension.   Musculoskeletal:         General: No swelling.      Right lower leg: No edema.      Left lower leg: No edema.   Skin:     General: Skin is warm and dry.      Coloration: Skin is not jaundiced or pale.   Neurological:      General: No focal deficit present.      Mental Status: She is alert and oriented to person, place, and time. Mental status is at baseline.   Psychiatric:         Behavior: " Behavior normal.         Thought Content: Thought content normal.         Judgment: Judgment normal.      Comments: Patient was teary during this visit given her complex medical history and ongoing issues with her urological issues and pain.         Recent labs:  Lab Results   Component Value Date/Time    SODIUM 138 12/22/2024 06:10 AM    K 3.7 12/22/2024 06:10 AM    BUN 30 (H) 12/22/2024 06:10 AM    CREATININE 1.05 12/22/2024 06:10 AM    GLUC 88 12/22/2024 06:10 AM    CALCIUM 10.0 12/22/2024 06:10 AM    AST 65 (H) 12/19/2024 10:22 PM    ALT 85 (H) 12/19/2024 10:22 PM    ALB 4.0 12/19/2024 10:22 PM    TP 7.0 12/19/2024 10:22 PM    EGFR 59 12/22/2024 06:10 AM     Lab Results   Component Value Date/Time    HGB 7.5 (L) 12/23/2024 06:13 AM    WBC 2.35 (L) 12/23/2024 06:13 AM     (L) 12/23/2024 06:13 AM    INR 1.00 12/09/2024 08:18 AM    PTT 27 03/02/2024 03:57 AM     Lab Results   Component Value Date/Time    CBJ1ZKXFJSFX 1.063 09/13/2024 06:30 AM       Recent Imaging:  Procedure: CT abdomen pelvis with contrast  Result Date: 12/20/2024  Narrative: CT ABDOMEN AND PELVIS WITH IV CONTRAST INDICATION: pelvic pain + UTI + stents - states pain feels same when stents get clogged. . COMPARISON: CT of the abdomen pelvis November 15, 2024. TECHNIQUE: CT examination of the abdomen and pelvis was performed. Multiplanar 2D reformatted images were created from the source data. This examination, like all CT scans performed in the [facility], was performed utilizing techniques to minimize radiation dose exposure, including the use of iterative reconstruction and automated exposure control. Radiation dose length product (DLP) for this visit: 339 mGy-cm IV Contrast: 85 mL of iohexol (OMNIPAQUE) Enteric Contrast: Not administered. FINDINGS: ABDOMEN LOWER CHEST: Multiple bibasilar nodules are stable compared with prior examination. LIVER/BILIARY TREE: Unremarkable. GALLBLADDER: No calcified gallstones. No  pericholecystic inflammatory change. Trace gallbladder wall thickening similar to prior exam, nonspecific. SPLEEN: Unremarkable. PANCREAS: Unremarkable. ADRENAL GLANDS: Unremarkable. KIDNEYS/URETERS: Multiple calculi within the lower pole of the right kidney measuring up to 5 mm. There is a ureteral stent extending from the right renal pelvis to the bladder. A few calculi are seen adjacent to the proximal stent measuring up to 3 mm (series 601, image 85). There is mild right hydronephrosis. Punctate calculi are seen within the left kidney. There is a left-sided ureteral stent extending from the renal pelvis to the bladder. A 3 mm calculus is seen adjacent to the stent in the distal  ureter (series 302, image 102). There is mild left hydronephrosis. There is periureteral fat stranding bilaterally. STOMACH AND BOWEL:  There is a right lower quadrant ileostomy. No bowel obstruction. APPENDIX: No findings to suggest appendicitis. ABDOMINOPELVIC CAVITY: No ascites. No pneumoperitoneum. No lymphadenopathy. VESSELS: Unremarkable for patient's age. PELVIS REPRODUCTIVE ORGANS: Unremarkable for patient's age. URINARY BLADDER: Unremarkable. ABDOMINAL WALL/INGUINAL REGIONS: Unremarkable. BONES: No acute fracture or suspicious osseous lesion.     Impression: Bilateral nephrolithiasis. Bilateral ureteral stents in place. There is bilateral mild hydronephrosis. Bilateral periureteral fat stranding, correlate with urinalysis for infection. Workstation performed: [id number]     Procedure: POCT spirometry  Impression: 12/02/2024 - Ratio 89%, FVC 2.93L (93%, Z -0.55), FEV1 2.60L (106%, Z 0.44) - normal spirometry    Procedure: CT abdomen pelvis with contrast  Result Date: 11/15/2024  Narrative: CT ABDOMEN AND PELVIS WITH IV CONTRAST INDICATION: sudden onset right flank pain x2h, hx of kidney stones, ureteral stents, sigmoid colon cancer with ostomy. . COMPARISON: CT chest abdomen pelvis on 7/3/2024 and 10/2/2024 TECHNIQUE: CT  examination of the abdomen and pelvis was performed. Multiplanar 2D reformatted images were created from the source data. This examination, like all CT scans performed in the [facility], was performed utilizing techniques to minimize radiation dose exposure, including the use of iterative reconstruction and automated exposure control. Radiation dose length product (DLP) for this visit: 339 mGy-cm IV Contrast: 85 mL of iohexol (OMNIPAQUE) 50 mL of iohexol (OMNIPAQUE) Enteric Contrast: Not administered. FINDINGS: ABDOMEN LOWER CHEST: Multiple pulmonary nodules in the imaged lung bases measuring up to 8 mm are again seen, slightly increased in size and number compared to the prior exam. No pleural or pericardial effusions. LIVER/BILIARY TREE: Unremarkable. GALLBLADDER: No calcified gallstones. No pericholecystic inflammatory change. SPLEEN: Splenomegaly without focal lesions PANCREAS: Unremarkable. ADRENAL GLANDS: Splenomegaly without focal lesions. KIDNEYS/URETERS: Kidneys normal in size and position. Mild left and moderate right hydronephrosis. No perinephric fluid collections. Multiple lower pelvic phleboliths. Asymmetrical right perinephric hazy inflammatory stranding noted. Bilateral ureteral stents noted in place with distal pigtail loops within the posterior inferior bladder and proximal pigtail loops in the left upper pole collecting system and right renal pelvic regions. STOMACH AND BOWEL: Stomach is moderately distended with air and oral contrast/debris. The administered oral contrast has reached pelvic distal ileal loops. No findings of bowel obstruction or mesenteric inflammation noted. Small and large bowel loops are  normal in course and caliber without evidence for obstruction. Right ventral abdominal ileostomy. APPENDIX: No findings to suggest appendicitis. ABDOMINOPELVIC CAVITY: No ascites or loculated fluid collection. No pneumoperitoneum. No lymphadenopathy by size criteria.  Nonspecific subcentimeter left retroperitoneal/para-aortic lymph nodes are again seen.. VESSELS: Atherosclerosis without abdominal aortic aneurysm. PELVIS REPRODUCTIVE ORGANS: Unremarkable for patient's age. URINARY BLADDER: No intraluminal mass/calculi or irregular wall thickening. Distal pigtail loops of the bilateral ureteral stents noted within the bladder lumen. ABDOMINAL WALL/INGUINAL REGIONS: Unremarkable. BONES: No acute fracture or suspicious osseous lesion.     Impression: 1.  Multiple pulmonary nodules in the imaged lung bases measuring up to 8 mm are again seen, slightly increased in size and number compared to the prior exam. 2.  Splenomegaly without focal lesions. 3.  Mild left and moderate right hydronephrosis. Nonobstructing calculi in the lower pole of the right kidney. Bilateral ureteral stents noted in place with distal pigtail loops within the posterior inferior bladder and proximal pigtail loops in the left  upper pole collecting system and right renal pelvic regions.  Asymmetrical right perinephric hazy inflammatory stranding noted. Recommend correlation with urinalysis to exclude right  tract infection. 4.  Right ventral abdominal ileostomy. Dilated distal ileal loops prior to the ileostomy site containing heterogeneous density debris. Findings may indicate focal ileus or dysmotility. No evidence for bowel obstruction, mesenteric inflammation, appendicitis, free air, or free fluid. Workstation performed: [id number]     Procedure: FL retrograde pyelogram  Result Date: 10/18/2024  Narrative: [facility] - FL RETROGRADE PYELOGRAM INDICATION: N20.1: Calculus of ureter. Procedure guidance. TECHNIQUE: Fluoroscopic guidance provided. COMPARISON: None FLUOROSCOPY TIME: 6 minutes 15 seconds 62 FLUOROSCOPIC IMAGES FINDINGS: Fluoroscopy provided for procedure guidance. Initial image demonstrates a double-J stent on the left with on the right. Follow-up contrast images demonstrate narrowing of the proximal  ureter followed by balloon and stent placement. Left double-J stent was removed and contrast injection demonstrates irregularity with filling defect in the mid ureter with balloon followed by interval double-J stent placement. The collecting system in the left kidney demonstrates filling defects followed by lithotripsy Osseous and soft tissue detail limited by technique.     Impression: Fluoroscopy provided for procedure guidance. Please refer to the separate procedure note for additional details. Workstation performed: [id number]     Procedure: CT chest abdomen pelvis w contrast  Result Date: 10/3/2024  Narrative: CT CHEST, ABDOMEN AND PELVIS WITH IV CONTRAST INDICATION: C20: Malignant neoplasm of rectum R91.8: Other nonspecific abnormal finding of lung field. COMPARISON: CT chest and pelvis 6/24/2024 and CT abdomen pelvis 9/24/2024. TECHNIQUE: CT examination of the chest, abdomen and pelvis was performed. Multiplanar 2D reformatted images were created from the source data. This examination, like all CT scans performed in the [facility], was performed utilizing techniques to minimize radiation dose exposure, including the use of iterative reconstruction and automated exposure control. Radiation dose length product (DLP) for this visit: 527 mGy-cm IV Contrast: 100 mL of iohexol (OMNIPAQUE) Enteric Contrast: Not administered. FINDINGS: CHEST LUNGS: Numerous bilateral pulmonary nodules, many of which are cavitary, unchanged from previous examination. Stable 1.3 cm irregular nodular opacity in the lingula. See representative nodules in the key images. There are no new or enlarging nodules. No tracheal or endobronchial lesion. PLEURA: Unremarkable. HEART/GREAT VESSELS: Heart is unremarkable for patient's age. No thoracic aortic aneurysm. MEDIASTINUM AND DUSTY: No lymphadenopathy. CHEST WALL AND LOWER NECK: Stable 2.0 x 1.1 cm lymph node in the right neck (2/8). Partial imaged right thyroid nodule does not  appear changed. Right-sided chest port. ABDOMEN LIVER/BILIARY TREE: Unremarkable. GALLBLADDER: Decompressed. No calcified gallstones. No pericholecystic inflammatory change. SPLEEN: No significant change in diffuse subtle low-density lesions throughout the spleen. PANCREAS: Unremarkable. ADRENAL GLANDS: Stable 9 mm right adrenal nodule. KIDNEYS/URETERS: Bilateral ureteral stents in stable position. Hydronephrosis has resolved. Bilateral nonobstructing nephrolithiasis. Subcentimeter hypoattenuating renal lesion(s), too small to characterize but statistically likely benign, which do not warrant follow-up (Radiology June 2019). STOMACH AND BOWEL: No dilated loops of bowel. Colonic resection changes with ileostomy in the right upper quadrant.. APPENDIX: No findings to suggest appendicitis. ABDOMINOPELVIC CAVITY: No ascites. No pneumoperitoneum. No lymphadenopathy. VESSELS: Unremarkable for patient's age. PELVIS REPRODUCTIVE ORGANS: Unremarkable for patient's age. URINARY BLADDER: Unremarkable. ABDOMINAL WALL/INGUINAL REGIONS: Unremarkable. BONES: No acute fracture or suspicious osseous lesion.     Impression: Stable pulmonary nodules and splenic hypodensities. No new sites of disease. Bilateral ureteral stents with resolution of hydronephrosis. Workstation performed: [id number]     Procedure: FL retrograde pyelogram  Result Date: 9/26/2024  Narrative: BILATERAL RETROGRADE PYELOGRAM INDICATION: Ureteral stent present [Z96.0]. COMPARISON: 9/24/2024 IMAGES: 10 FLUOROSCOPY TIME: 49 seconds CONTRAST: 10 mL of iohexol (OMNIPAQUE) FINDINGS: There is mild left-sided hydronephrosis and proximal hydroureter. The remainder of the ureter was not opacified. No filling defects are seen. There is replacement of the left nephroureteral stent. There are right lower pole calculi. There is moderate right-sided hydronephrosis. There is replacement of the right nephroureteral stent. The right ureter was not opacified. Osseous and soft tissue  detail limited by technique.     Impression: Fluoroscopic guidance provided for bilateral retrograde pyelogram. Please see separate procedure report for additional details. Workstation performed: [id number]     Procedure: CT abdomen pelvis with contrast  Result Date: 9/24/2024  Narrative: CT ABDOMEN AND PELVIS WITH IV CONTRAST INDICATION: Vaginal pain, hx of ureteral strictures. Months of worsening perineal pain with dysuria. History of malignant neoplasm of the sigmoid colon. COMPARISON: June 24, 2024. TECHNIQUE: CT examination of the abdomen and pelvis was performed. Multiplanar 2D reformatted images were created from the source data. This examination, like all CT scans performed in the [facility], was performed utilizing techniques to minimize radiation dose exposure, including the use of iterative reconstruction and automated exposure control. Radiation dose length product (DLP) for this visit: 441 mGy-cm IV Contrast: 80 mL of iohexol (OMNIPAQUE) Enteric Contrast: Not administered. FINDINGS: ABDOMEN LOWER CHEST: Small pulmonary nodules bilaterally at the lung base appear similar to June 24, 2024. LIVER/BILIARY TREE: Unremarkable. GALLBLADDER: No calcified gallstones. No pericholecystic inflammatory change. SPLEEN: Multiple small hypodense lesions in the spleen, better demonstrated on the prior study. PANCREAS: Unremarkable. ADRENAL GLANDS: Small right adrenal nodule unchanged. KIDNEYS/URETERS: Bilateral ureteral stents are in place. A focal area of concretion/encrustation about the proximal right ureteral stent appears similar to the prior study (series 301 image [id number]). There is a 5 mm calculus in the mid to distal left ureter adjacent to the stent (series 301 image , series 601 image 84-85). There is bilateral hydronephrosis, right greater than left, worse compared to the prior study. There is bilateral nephrolithiasis. There is some urothelial enhancement involving portions of the  bilateral renal collecting systems, renal pelves and proximal ureters, concerning for superimposed pyelonephritis. STOMACH AND BOWEL: There is a diverting loop ileostomy on the right near the level of the umbilicus. Anastomotic sutures are present in the region of the rectum. There is no bowel obstruction. APPENDIX: No findings to suggest appendicitis. ABDOMINOPELVIC CAVITY: No ascites. No pneumoperitoneum. Shotty retroperitoneal nodes unchanged. VESSELS: Atherosclerosis. No abdominal aortic aneurysm. PELVIS REPRODUCTIVE ORGANS: There is some fluid in the proximal vagina (series 602 image [id number]). URINARY BLADDER: Unremarkable. ABDOMINAL WALL/INGUINAL REGIONS: Right-sided ileostomy as described above. Postoperative changes. BONES: No acute fracture or suspicious osseous lesion.     Impression: Bilateral hydronephrosis, right greater than left, worse compared with June 24, 2024. Areas of urothelial enhancement, as described above, concerning for possible superimposed pyelonephritis. Correlation with urinalysis and urine culture and sensitivity is recommended. Bilateral ureteral stents are in place. A focal area of concretion/encrustation adjacent to the proximal right right ureteral stent appears similar to June 24, 2024. There is a 5 mm calculus in the mid to distal left ureter adjacent to the left ureteral stent; this appears new compared to the prior study. There is bilateral nephrolithiasis. There is some fluid in the proximal vagina. Clinical correlation recommended. Consider Gynecology consultation, if clinically appropriate. Other nonemergent findings as above. The study was marked in EPIC for immediate notification. Workstation performed: [id number]       Administrative Statements   I have spent a total time of 35 minutes in caring for this patient on the day of the visit/encounter including Risks and benefits of tx options, Instructions for management, Patient and family education, Importance of tx  compliance, Risk factor reductions, Impressions, Counseling / Coordination of care, Documenting in the medical record, Reviewing / ordering tests, medicine, procedures  , and Obtaining or reviewing history  . Topics discussed with the patient / family include symptom assessment and management, medication review, psychosocial support, supportive listening, and anticipatory guidance.

## 2024-12-31 NOTE — ASSESSMENT & PLAN NOTE
Psychosocial   Supportive listening provided  Normalized experience of patient/family  Provided anxiety containment     Referrals Placed / Medical Equipment Ordered  -None today    Follow-Up Recommendations  -Follow-up with PCP and current medical specialists  -Follow-up with palliative care: 4 weeks for recheck

## 2024-12-31 NOTE — ASSESSMENT & PLAN NOTE
Patient endorses pain is typically with urination (chronic) and does endorse positional discomfort as well.

## 2024-12-31 NOTE — ASSESSMENT & PLAN NOTE
Currently she uses topical steroid and topical phenylephrine   has follow-up with colorectal surgery next month.

## 2024-12-31 NOTE — ASSESSMENT & PLAN NOTE
Potassium was stopped in hospital.  Recheck potassium level.    Orders:    Basic metabolic panel; Future

## 2025-01-02 ENCOUNTER — TELEPHONE (OUTPATIENT)
Dept: PALLIATIVE MEDICINE | Facility: CLINIC | Age: 60
End: 2025-01-02

## 2025-01-02 ENCOUNTER — TELEPHONE (OUTPATIENT)
Dept: INTERNAL MEDICINE CLINIC | Facility: CLINIC | Age: 60
End: 2025-01-02

## 2025-01-02 DIAGNOSIS — Z51.5 PALLIATIVE CARE PATIENT: Primary | ICD-10-CM

## 2025-01-02 DIAGNOSIS — C18.7 MALIGNANT NEOPLASM OF SIGMOID COLON (HCC): ICD-10-CM

## 2025-01-02 RX ORDER — OXYCODONE HYDROCHLORIDE 5 MG/1
5 TABLET ORAL EVERY 8 HOURS PRN
Qty: 90 TABLET | Refills: 0 | Status: SHIPPED | OUTPATIENT
Start: 2025-01-02

## 2025-01-02 NOTE — TELEPHONE ENCOUNTER
PA for hydrocortisone  2.5 % rectal  cream SUBMITTED to [employer]    via      [x][employer]-Case ID # [id number]      [x]PA sent as URGENT    All office notes, labs and other pertaining documents and studies sent. Clinical questions answered. Awaiting determination from insurance company.     Turnaround time for your insurance to make a decision on your Prior Authorization can take 7-21 business days.

## 2025-01-02 NOTE — TELEPHONE ENCOUNTER
Refill of Oxycodone as patient has been on this chronically from our [facility] team and now following me.  Patient last seen on 12/30/2024 for follow-up.   Having ongoing pain chronically.    Patient typically taking 3 tablets in a day which has helped with her pain overall.     Plans to eventually taper as able vs potential referral to a chronic pain medicine specialist if there is no evidence of pain related to malignancy as she is BYRON status at this time.    Will continue to support patient at this time until she is able to follow-up with her other specialists for ongoing plans.

## 2025-01-03 RX ORDER — FERROUS SULFATE 324(65)MG
324 TABLET, DELAYED RELEASE (ENTERIC COATED) ORAL
Qty: 30 TABLET | Refills: 0 | Status: SHIPPED | OUTPATIENT
Start: 2025-01-03

## 2025-01-03 NOTE — TELEPHONE ENCOUNTER
PA hydrocortisone 2.5 % rectal cream DENIED    Reason:(Screenshot if applicable)        Message sent to office clinical pool Yes    Denial letter scanned into Media Yes    Appeal started No (Provider will need to decide if appeal is warranted and send clinical documentation to Prior Authorization Team for initiation.)    **Please follow up with your patient regarding denial and next steps**

## 2025-01-10 ENCOUNTER — PATIENT MESSAGE (OUTPATIENT)
Dept: INTERNAL MEDICINE CLINIC | Facility: CLINIC | Age: 60
End: 2025-01-10

## 2025-01-10 ENCOUNTER — PREP FOR PROCEDURE (OUTPATIENT)
Dept: UROLOGY | Facility: AMBULATORY SURGERY CENTER | Age: 60
End: 2025-01-10

## 2025-01-10 DIAGNOSIS — N13.5 URETERAL STRICTURE: Primary | ICD-10-CM

## 2025-01-10 DIAGNOSIS — R39.89 SUSPECTED UTI: ICD-10-CM

## 2025-01-13 NOTE — H&P (VIEW-ONLY)
Name: Danielle Haque      : 1965      MRN: [id number]  Encounter Provider: Sree Umanzor MD  Encounter Date: 2025   Encounter [facility]  :  Assessment & Plan  History of rectal cancer (HCC)  Danielle presents today, history rectal cancer,robotic low anterior resection, low pelvic anastomosis, colorectal anastomosis, stapled, Left-sided loop colostomy reversal, Diverting Loop ileostomy on 2023, we had prior discussed ypT2N0 result, negative margins, excellent outcome.      Follow-up sigmoidoscopy 2023 with healthy anastomosis, we had discussed ileostomy reversal at that time however she has had multiple interval procedures for ureteral strictures, she may potentially need right-sided ureteral reconstruction with Dr. Stephens.  She has had multiple interval urologic interventions, procedures and hospitalizations as well as being on steroid that have precluded surgical scheduling.    She is overdue for surveillance colonoscopy, will attempt this unprepped given diverted and will make surgical plans following this for at least ileostomy reversal.      CC:  Dr. Héctor Ferrera         Rectal pain  On effacement she has tenderness and moving in prone jackknife position, examination was discontinued as she also needs colonoscopy under sedation.  Tiny prolapsing anal tag seen, no obvious fissure, lidocaine ointment is given, will reexamine under sedation at the time of colonoscopy.    Orders:    lidocaine (XYLOCAINE) 5 % ointment; Apply topically as needed for mild pain           History of Present Illness     HPI    Danielle Haque is a 59 y.o. female with a history of ypT2N0 rectal cancer who presents for a follow up.     She was last seen in the office on 24 and an ileostomy reversal was planned at that time but this procedure was canceled.     The patient is status post robotic low anterior  "resection, low pelvic anastomosis, colorectal anastomosis, stapled, Left-sided loop colostomy reversal, Diverting Loop ileostomy on 8/17/2023.     She has been experiencing rectal pain for the past few weeks.     She also reports perianal lumps. She is using hydrocortisone rectal cream daily.    She has daily ostomy output.     Last CT abdomen pelvis with contrast 12/20/24  IMPRESSION:  Bilateral nephrolithiasis. Bilateral ureteral stents in place. There is bilateral mild hydronephrosis. Bilateral periureteral fat stranding, correlate with urinalysis for infection.    Last colonoscopy was performed on 11/01/2023 with a 1 year recall.    Lab Results   Component Value Date    CEA 4.4 (H) 05/30/2024     Lab Results   Component Value Date    WBC 2.35 (L) 12/23/2024    HGB 7.5 (L) 12/23/2024    HCT 23.0 (L) 12/23/2024    MCV 92 12/23/2024     (L) 12/23/2024     Lab Results   Component Value Date    SODIUM 138 12/22/2024    K 3.7 12/22/2024     12/22/2024    CO2 24 12/22/2024    AGAP 6 12/22/2024    BUN 30 (H) 12/22/2024    CREATININE 1.05 12/22/2024    GLUC 88 12/22/2024    GLUF 76 12/09/2024    CALCIUM 10.0 12/22/2024    AST 65 (H) 12/19/2024    ALT 85 (H) 12/19/2024    ALKPHOS 142 (H) 12/19/2024    TP 7.0 12/19/2024    TBILI 0.47 12/19/2024    EGFR 59 12/22/2024     Review of Systems   Constitutional: Negative.    HENT: Negative.     Eyes: Negative.    Respiratory: Negative.     Cardiovascular: Negative.    Gastrointestinal:  Positive for rectal pain.   Endocrine: Negative.    Genitourinary: Negative.    Musculoskeletal: Negative.    Skin: Negative.    Allergic/Immunologic: Negative.    Neurological: Negative.    Hematological: Negative.    Psychiatric/Behavioral: Negative.         Objective   Ht 5' 2\" (1.575 m)   Wt 53.1 kg (117 lb)   BMI 21.40 kg/m²      Physical Exam  Constitutional:       Comments: anxious   Pulmonary:      Effort: Pulmonary effort is normal.   Abdominal:      General: Abdomen is " flat. There is no distension.      Palpations: Abdomen is soft.      Tenderness: There is no abdominal tenderness.   Genitourinary:     Comments: She has pain on effacement, exam was not continued, digital was not performed, tiny prolapsing anal tag

## 2025-01-13 NOTE — PROGRESS NOTES
Name: Danielle Haque      : 1965      MRN: [id number]  Encounter Provider: Sree Umanzor MD  Encounter Date: 2025   Encounter department[facility]  :  Assessment & Plan  History of rectal cancer (HCC)  Danielle presents today, history rectal cancer,robotic low anterior resection, low pelvic anastomosis, colorectal anastomosis, stapled, Left-sided loop colostomy reversal, Diverting Loop ileostomy on 2023, we had prior discussed ypT2N0 result, negative margins, excellent outcome.      Follow-up sigmoidoscopy 2023 with healthy anastomosis, we had discussed ileostomy reversal at that time however she has had multiple interval procedures for ureteral strictures, she may potentially need right-sided ureteral reconstruction with Dr. Stephens.  She has had multiple interval urologic interventions, procedures and hospitalizations as well as being on steroid that have precluded surgical scheduling.    She is overdue for surveillance colonoscopy, will attempt this unprepped given diverted and will make surgical plans following this for at least ileostomy reversal.      CC:  Dr. Héctor Ferrera         Rectal pain  On effacement she has tenderness and moving in prone jackknife position, examination was discontinued as she also needs colonoscopy under sedation.  Tiny prolapsing anal tag seen, no obvious fissure, lidocaine ointment is given, will reexamine under sedation at the time of colonoscopy.    Orders:    lidocaine (XYLOCAINE) 5 % ointment; Apply topically as needed for mild pain           History of Present Illness     HPI    Danielle Haque is a 59 y.o. female with a history of ypT2N0 rectal cancer who presents for a follow up.     She was last seen in the office on 24 and an ileostomy reversal was planned at that time but this procedure was canceled.     The patient is status post robotic low anterior  "resection, low pelvic anastomosis, colorectal anastomosis, stapled, Left-sided loop colostomy reversal, Diverting Loop ileostomy on 8/17/2023.     She has been experiencing rectal pain for the past few weeks.     She also reports perianal lumps. She is using hydrocortisone rectal cream daily.    She has daily ostomy output.     Last CT abdomen pelvis with contrast 12/20/24  IMPRESSION:  Bilateral nephrolithiasis. Bilateral ureteral stents in place. There is bilateral mild hydronephrosis. Bilateral periureteral fat stranding, correlate with urinalysis for infection.    Last colonoscopy was performed on 11/01/2023 with a 1 year recall.    Lab Results   Component Value Date    CEA 4.4 (H) 05/30/2024     Lab Results   Component Value Date    WBC 2.35 (L) 12/23/2024    HGB 7.5 (L) 12/23/2024    HCT 23.0 (L) 12/23/2024    MCV 92 12/23/2024     (L) 12/23/2024     Lab Results   Component Value Date    SODIUM 138 12/22/2024    K 3.7 12/22/2024     12/22/2024    CO2 24 12/22/2024    AGAP 6 12/22/2024    BUN 30 (H) 12/22/2024    CREATININE 1.05 12/22/2024    GLUC 88 12/22/2024    GLUF 76 12/09/2024    CALCIUM 10.0 12/22/2024    AST 65 (H) 12/19/2024    ALT 85 (H) 12/19/2024    ALKPHOS 142 (H) 12/19/2024    TP 7.0 12/19/2024    TBILI 0.47 12/19/2024    EGFR 59 12/22/2024     Review of Systems   Constitutional: Negative.    HENT: Negative.     Eyes: Negative.    Respiratory: Negative.     Cardiovascular: Negative.    Gastrointestinal:  Positive for rectal pain.   Endocrine: Negative.    Genitourinary: Negative.    Musculoskeletal: Negative.    Skin: Negative.    Allergic/Immunologic: Negative.    Neurological: Negative.    Hematological: Negative.    Psychiatric/Behavioral: Negative.         Objective   Ht 5' 2\" (1.575 m)   Wt 53.1 kg (117 lb)   BMI 21.40 kg/m²      Physical Exam  Constitutional:       Comments: anxious   Pulmonary:      Effort: Pulmonary effort is normal.   Abdominal:      General: Abdomen is " flat. There is no distension.      Palpations: Abdomen is soft.      Tenderness: There is no abdominal tenderness.   Genitourinary:     Comments: She has pain on effacement, exam was not continued, digital was not performed, tiny prolapsing anal tag

## 2025-01-14 ENCOUNTER — OFFICE VISIT (OUTPATIENT)
Age: 60
End: 2025-01-14
Payer: COMMERCIAL

## 2025-01-14 ENCOUNTER — APPOINTMENT (OUTPATIENT)
Dept: LAB | Facility: AMBULARY SURGERY CENTER | Age: 60
End: 2025-01-14
Attending: UROLOGY
Payer: COMMERCIAL

## 2025-01-14 ENCOUNTER — TELEPHONE (OUTPATIENT)
Age: 60
End: 2025-01-14

## 2025-01-14 VITALS — HEIGHT: 62 IN | WEIGHT: 117 LBS | BODY MASS INDEX: 21.53 KG/M2

## 2025-01-14 DIAGNOSIS — D86.89 HEPATIC GRANULOMA ASSOCIATED WITH SARCOIDOSIS: ICD-10-CM

## 2025-01-14 DIAGNOSIS — C20 RECTAL CANCER (HCC): Primary | ICD-10-CM

## 2025-01-14 DIAGNOSIS — R39.89 SUSPECTED UTI: ICD-10-CM

## 2025-01-14 DIAGNOSIS — N13.5 URETERAL STRICTURE: ICD-10-CM

## 2025-01-14 DIAGNOSIS — K62.89 RECTAL PAIN: ICD-10-CM

## 2025-01-14 PROCEDURE — 99215 OFFICE O/P EST HI 40 MIN: CPT | Performed by: COLON & RECTAL SURGERY

## 2025-01-14 PROCEDURE — 87086 URINE CULTURE/COLONY COUNT: CPT

## 2025-01-14 RX ORDER — LIDOCAINE 50 MG/G
OINTMENT TOPICAL AS NEEDED
Qty: 30 G | Refills: 2 | Status: SHIPPED | OUTPATIENT
Start: 2025-01-14

## 2025-01-14 NOTE — LETTER
Danielle Garland  [address] [facility] [address]        Location:  [address] [address]    Performing Physician: Sree Bhandari MD        DATE OF PROCEDURE: February 3, 2025 TIME OF PROCEDURE:         TBD                          The OR/GI Lab will contact you the evening prior to your procedure with your exact arrival time.    We kindly ask that you immediately notify us of any need to cancel or reschedule your procedure.

## 2025-01-14 NOTE — ASSESSMENT & PLAN NOTE
Danielle presents today, history rectal cancer,robotic low anterior resection, low pelvic anastomosis, colorectal anastomosis, stapled, Left-sided loop colostomy reversal, Diverting Loop ileostomy on 8/17/2023, we had prior discussed ypT2N0 result, negative margins, excellent outcome.      Follow-up sigmoidoscopy 11/2023 with healthy anastomosis, we had discussed ileostomy reversal at that time however she has had multiple interval procedures for ureteral strictures, she may potentially need right-sided ureteral reconstruction with Dr. Stephens.  She has had multiple interval urologic interventions, procedures and hospitalizations as well as being on steroid that have precluded surgical scheduling.    She is overdue for surveillance colonoscopy, will attempt this unprepped given diverted and will make surgical plans following this for at least ileostomy reversal.      CC:  Dr. Héctor Ferrera

## 2025-01-14 NOTE — ASSESSMENT & PLAN NOTE
On effacement she has tenderness and moving in prone jackknife position, examination was discontinued as she also needs colonoscopy under sedation.  Tiny prolapsing anal tag seen, no obvious fissure, lidocaine ointment is given, will reexamine under sedation at the time of colonoscopy.    Orders:    lidocaine (XYLOCAINE) 5 % ointment; Apply topically as needed for mild pain

## 2025-01-15 LAB — BACTERIA UR CULT: NORMAL

## 2025-01-15 RX ORDER — URSODIOL 300 MG/1
900 CAPSULE ORAL DAILY
Qty: 90 CAPSULE | Refills: 11 | Status: SHIPPED | OUTPATIENT
Start: 2025-01-15 | End: 2026-01-15

## 2025-01-15 NOTE — PRE-PROCEDURE INSTRUCTIONS
Pre-Surgery Instructions:   Medication Instructions    acetaminophen (TYLENOL) 500 mg tablet Uses PRN- OK to take day of surgery    clotrimazole (GYNE-LOTRIMIN) 1 % vaginal cream Hold day of surgery.    cyanocobalamin (VITAMIN B-12) 1000 MCG tablet HOLD starting today.    estrogens, conjugated (Premarin) vaginal cream Hold day of surgery.    fenofibrate (TRICOR) 145 mg tablet Take night before surgery    ferrous sulfate 324 (65 Fe) mg Hold DOS    folic acid (FOLVITE) 1 mg tablet HOLD starting today.    hydrocortisone (ANUSOL-HC) 2.5 % rectal cream Hold day of surgery.    lidocaine (XYLOCAINE) 5 % ointment Hold day of surgery.    methotrexate 2.5 MG tablet Only takes Wednesdays     oxybutynin (DITROPAN) 5 mg tablet Hold DOS    oxyCODONE (Roxicodone) 5 immediate release tablet Uses PRN- OK to take day of surgery    tamsulosin (FLOMAX) 0.4 mg Take day of surgery.    ursodiol (ACTIGALL) 300 mg capsule Hold day of surgery.   Medication instructions for day surgery reviewed. Please use only a sip of water to take your instructed medications. Avoid all over the counter vitamins, supplements and NSAIDS for one week prior to surgery per anesthesia guidelines. Tylenol is ok to take as needed.     You will receive a call one business day prior to surgery with an arrival time and hospital directions. If your surgery is scheduled on a Monday, the hospital will be calling you on the Friday prior to your surgery. If you have not heard from anyone by 8pm, please call the hospital supervisor through the hospital  at [phone].  or [place] [phone]).    Do not eat or drink anything after midnight the night before your surgery, including candy, mints, lifesavers, or chewing gum. Do not drink alcohol 24hrs before your surgery. Try not to smoke at least 24hrs before your surgery.       Follow the pre surgery showering instructions as listed in the “My Surgical Experience Booklet” or otherwise provided by your surgeon's  office. Do not use a blade to shave the surgical area 1 week before surgery. It is okay to use a clean electric clippers up to 24 hours before surgery. Do not apply any lotions, creams, including makeup, cologne, deodorant, or perfumes after showering on the day of your surgery. Do not use dry shampoo, hair spray, hair gel, or any type of hair products.     No contact lenses, eye make-up, or artificial eyelashes. Remove nail polish, including gel polish, and any artificial, gel, or acrylic nails if possible. Remove all jewelry including rings and body piercing jewelry.     Wear causal clothing that is easy to take on and off. Consider your type of surgery.    Keep any valuables, jewelry, piercings at home. Please bring any specially ordered equipment (sling, braces) if indicated.    Arrange for a responsible person to drive you to and from the hospital on the day of your surgery. Please confirm the visitor policy for the day of your procedure when you receive your phone call with an arrival time.     Call the surgeon's office with any new illnesses, exposures, or additional questions prior to surgery.    Please reference your “My Surgical Experience Booklet” for additional information to prepare for your upcoming surgery.

## 2025-01-17 ENCOUNTER — ANESTHESIA EVENT (OUTPATIENT)
Dept: PERIOP | Facility: HOSPITAL | Age: 60
End: 2025-01-17
Payer: COMMERCIAL

## 2025-01-17 ENCOUNTER — ANESTHESIA (OUTPATIENT)
Dept: PERIOP | Facility: HOSPITAL | Age: 60
End: 2025-01-17
Payer: COMMERCIAL

## 2025-01-17 ENCOUNTER — HOSPITAL ENCOUNTER (OUTPATIENT)
Facility: HOSPITAL | Age: 60
Setting detail: OUTPATIENT SURGERY
Discharge: HOME | End: 2025-01-17
Attending: UROLOGY | Admitting: UROLOGY
Payer: COMMERCIAL

## 2025-01-17 ENCOUNTER — APPOINTMENT (OUTPATIENT)
Dept: RADIOLOGY | Facility: HOSPITAL | Age: 60
End: 2025-01-17
Payer: COMMERCIAL

## 2025-01-17 ENCOUNTER — TELEPHONE (OUTPATIENT)
Dept: UROLOGY | Facility: CLINIC | Age: 60
End: 2025-01-17

## 2025-01-17 VITALS
OXYGEN SATURATION: 100 % | BODY MASS INDEX: 21.53 KG/M2 | HEART RATE: 87 BPM | TEMPERATURE: 97.3 F | DIASTOLIC BLOOD PRESSURE: 54 MMHG | RESPIRATION RATE: 16 BRPM | HEIGHT: 62 IN | WEIGHT: 117 LBS | SYSTOLIC BLOOD PRESSURE: 121 MMHG

## 2025-01-17 DIAGNOSIS — N13.39 OTHER HYDRONEPHROSIS: Primary | ICD-10-CM

## 2025-01-17 DIAGNOSIS — N13.5 URETERAL STRICTURE: Primary | ICD-10-CM

## 2025-01-17 PROCEDURE — 52332 CYSTOSCOPY AND TREATMENT: CPT | Performed by: UROLOGY

## 2025-01-17 PROCEDURE — C2625 STENT, NON-COR, TEM W/DEL SY: HCPCS | Performed by: UROLOGY

## 2025-01-17 PROCEDURE — C1769 GUIDE WIRE: HCPCS | Performed by: UROLOGY

## 2025-01-17 PROCEDURE — C1758 CATHETER, URETERAL: HCPCS | Performed by: UROLOGY

## 2025-01-17 PROCEDURE — 74420 UROGRAPHY RTRGR +-KUB: CPT

## 2025-01-17 PROCEDURE — NC001 PR NO CHARGE: Performed by: UROLOGY

## 2025-01-17 DEVICE — STENT URETERAL 6FR 22CM INLAY OPTIMA W/NITINOL GDWR: Type: IMPLANTABLE DEVICE | Status: FUNCTIONAL

## 2025-01-17 RX ORDER — MEPERIDINE HYDROCHLORIDE 25 MG/ML
12.5 INJECTION INTRAMUSCULAR; INTRAVENOUS; SUBCUTANEOUS
Status: DISCONTINUED | OUTPATIENT
Start: 2025-01-17 | End: 2025-01-17 | Stop reason: HOSPADM

## 2025-01-17 RX ORDER — ONDANSETRON 2 MG/ML
4 INJECTION INTRAMUSCULAR; INTRAVENOUS ONCE AS NEEDED
Status: DISCONTINUED | OUTPATIENT
Start: 2025-01-17 | End: 2025-01-17 | Stop reason: HOSPADM

## 2025-01-17 RX ORDER — SODIUM CHLORIDE, SODIUM LACTATE, POTASSIUM CHLORIDE, CALCIUM CHLORIDE 600; 310; 30; 20 MG/100ML; MG/100ML; MG/100ML; MG/100ML
INJECTION, SOLUTION INTRAVENOUS CONTINUOUS PRN
Status: DISCONTINUED | OUTPATIENT
Start: 2025-01-17 | End: 2025-01-17

## 2025-01-17 RX ORDER — FENTANYL CITRATE/PF 50 MCG/ML
25 SYRINGE (ML) INJECTION
Status: DISCONTINUED | OUTPATIENT
Start: 2025-01-17 | End: 2025-01-17 | Stop reason: HOSPADM

## 2025-01-17 RX ORDER — ONDANSETRON 2 MG/ML
INJECTION INTRAMUSCULAR; INTRAVENOUS AS NEEDED
Status: DISCONTINUED | OUTPATIENT
Start: 2025-01-17 | End: 2025-01-17

## 2025-01-17 RX ORDER — PROPOFOL 10 MG/ML
INJECTION, EMULSION INTRAVENOUS AS NEEDED
Status: DISCONTINUED | OUTPATIENT
Start: 2025-01-17 | End: 2025-01-17

## 2025-01-17 RX ORDER — CEFAZOLIN SODIUM 2 G/50ML
2000 SOLUTION INTRAVENOUS
Status: DISCONTINUED | OUTPATIENT
Start: 2025-01-18 | End: 2025-01-17 | Stop reason: HOSPADM

## 2025-01-17 RX ORDER — HYDROMORPHONE HCL/PF 1 MG/ML
0.5 SYRINGE (ML) INJECTION
Status: DISCONTINUED | OUTPATIENT
Start: 2025-01-17 | End: 2025-01-17 | Stop reason: HOSPADM

## 2025-01-17 RX ORDER — FENTANYL CITRATE 50 UG/ML
INJECTION, SOLUTION INTRAMUSCULAR; INTRAVENOUS AS NEEDED
Status: DISCONTINUED | OUTPATIENT
Start: 2025-01-17 | End: 2025-01-17

## 2025-01-17 RX ORDER — ACETAMINOPHEN 325 MG/1
650 TABLET ORAL EVERY 4 HOURS PRN
Start: 2025-01-17

## 2025-01-17 RX ORDER — PHENAZOPYRIDINE HYDROCHLORIDE 200 MG/1
200 TABLET, FILM COATED ORAL 3 TIMES DAILY PRN
Qty: 10 TABLET | Refills: 0 | Status: SHIPPED | OUTPATIENT
Start: 2025-01-17 | End: 2025-01-21

## 2025-01-17 RX ORDER — CEFAZOLIN SODIUM 2 G/50ML
SOLUTION INTRAVENOUS AS NEEDED
Status: DISCONTINUED | OUTPATIENT
Start: 2025-01-17 | End: 2025-01-17

## 2025-01-17 RX ORDER — PROMETHAZINE HYDROCHLORIDE 25 MG/ML
12.5 INJECTION, SOLUTION INTRAMUSCULAR; INTRAVENOUS ONCE AS NEEDED
Status: DISCONTINUED | OUTPATIENT
Start: 2025-01-17 | End: 2025-01-17 | Stop reason: HOSPADM

## 2025-01-17 RX ORDER — OXYCODONE HYDROCHLORIDE 5 MG/1
5 TABLET ORAL EVERY 4 HOURS PRN
Qty: 5 TABLET | Refills: 0 | Status: SHIPPED | OUTPATIENT
Start: 2025-01-17 | End: 2025-01-22

## 2025-01-17 RX ORDER — NAPROXEN 500 MG/1
500 TABLET ORAL 2 TIMES DAILY WITH MEALS
Qty: 10 TABLET | Refills: 0 | Status: SHIPPED | OUTPATIENT
Start: 2025-01-17 | End: 2025-01-22

## 2025-01-17 RX ORDER — PROPOFOL 10 MG/ML
INJECTION, EMULSION INTRAVENOUS CONTINUOUS PRN
Status: DISCONTINUED | OUTPATIENT
Start: 2025-01-17 | End: 2025-01-17

## 2025-01-17 RX ORDER — ALBUTEROL SULFATE 0.83 MG/ML
2.5 SOLUTION RESPIRATORY (INHALATION) ONCE AS NEEDED
Status: DISCONTINUED | OUTPATIENT
Start: 2025-01-17 | End: 2025-01-17 | Stop reason: HOSPADM

## 2025-01-17 RX ORDER — MIDAZOLAM HYDROCHLORIDE 2 MG/2ML
INJECTION, SOLUTION INTRAMUSCULAR; INTRAVENOUS AS NEEDED
Status: DISCONTINUED | OUTPATIENT
Start: 2025-01-17 | End: 2025-01-17

## 2025-01-17 RX ORDER — LIDOCAINE HYDROCHLORIDE 10 MG/ML
INJECTION, SOLUTION EPIDURAL; INFILTRATION; INTRACAUDAL; PERINEURAL AS NEEDED
Status: DISCONTINUED | OUTPATIENT
Start: 2025-01-17 | End: 2025-01-17

## 2025-01-17 RX ADMIN — PROPOFOL 200 MG: 10 INJECTION, EMULSION INTRAVENOUS at 13:55

## 2025-01-17 RX ADMIN — FENTANYL CITRATE 25 MCG: 50 INJECTION INTRAMUSCULAR; INTRAVENOUS at 14:27

## 2025-01-17 RX ADMIN — PROPOFOL 80 MCG/KG/MIN: 10 INJECTION, EMULSION INTRAVENOUS at 13:56

## 2025-01-17 RX ADMIN — MIDAZOLAM 1 MG: 1 INJECTION INTRAMUSCULAR; INTRAVENOUS at 13:56

## 2025-01-17 RX ADMIN — SODIUM CHLORIDE, SODIUM LACTATE, POTASSIUM CHLORIDE, AND CALCIUM CHLORIDE: .6; .31; .03; .02 INJECTION, SOLUTION INTRAVENOUS at 13:53

## 2025-01-17 RX ADMIN — FENTANYL CITRATE 25 MCG: 50 INJECTION INTRAMUSCULAR; INTRAVENOUS at 14:14

## 2025-01-17 RX ADMIN — ONDANSETRON 4 MG: 2 INJECTION, SOLUTION INTRAMUSCULAR; INTRAVENOUS at 14:05

## 2025-01-17 RX ADMIN — MIDAZOLAM 1 MG: 1 INJECTION INTRAMUSCULAR; INTRAVENOUS at 13:53

## 2025-01-17 RX ADMIN — FENTANYL CITRATE 50 MCG: 50 INJECTION INTRAMUSCULAR; INTRAVENOUS at 13:55

## 2025-01-17 RX ADMIN — FENTANYL CITRATE 25 MCG: 50 INJECTION INTRAMUSCULAR; INTRAVENOUS at 14:12

## 2025-01-17 RX ADMIN — FENTANYL CITRATE 25 MCG: 50 INJECTION INTRAMUSCULAR; INTRAVENOUS at 14:32

## 2025-01-17 RX ADMIN — CEFAZOLIN SODIUM 2000 MG: 2 SOLUTION INTRAVENOUS at 13:55

## 2025-01-17 RX ADMIN — LIDOCAINE HYDROCHLORIDE 20 MG: 10 INJECTION, SOLUTION EPIDURAL; INFILTRATION; INTRACAUDAL; PERINEURAL at 13:55

## 2025-01-17 NOTE — OP NOTE
Operative Note     PATIENT:  Danielle Haque (MRN [id number])    DATE OF PROCEDURE:   1/17/2025    PRE-OP DIAGNOSIS:   1) bilateral ureteral stricture disease  2.  Indwelling bilateral ureteral stents    POST-OP DIAGNOSIS:   1) bilateral ureteral stricture disease  2.  Indwelling right ureteral stents    PROCEDURES PERFORMED:  1) Cystoscopy  2) bilateral retrograde pyelography with fluoroscopic interpretation  3) right ureteral stent exchange  4.  Left ureteral stent removal    SURGEON:  José Luis Stephens MD    ASSISTANTS:  There were no qualified teaching residents to assist with this case    ANESTHESIA: General/LMA     COMPLICATIONS:   None    ANTIBIOTICS:  Ancef    INTRAOPERATIVE THROMBOEMBOLISM PROPHYLAXIS:  Pneumatic compression stockings      FINDINGS:  Retrograde pyelogram on the left demonstrates nice opacification along the entire ureter including the site of prior dilation of ureteral stricture disease.  Of note final retrograde pyelogram demonstrates rather prompt emptying of the left kidney.  As such I elected not to replace the left ureteral stent.  The right ureteral stent was exchanged as planned    INDICATIONS FOR PROCEDURE:  Danielle Haque is an 59 y.o. old female with advanced stone disease and subsequently development of ureteral stricture disease bilaterally.  She has a dense proximal right ureteral stricture that will require reconstruction.  She has had a left distal ureteral stricture and previously underwent a dilation procedure.  Plan was to maintain her left ureteral stent until the time of her ileostomy closure.  I have been informed by her colorectal surgeon that a left ureteral stent will not be a necessary part of that surgery and as such we discussed a trial of removing her left ureteral stent today.  I reviewed her recent CT scan which demonstrates no significant stone disease on the left..  After discussing the options, the patient elected to undergo ureteral stent placement.  We  discussed the procedure in detail, the alternatives, and the risks, and they signed informed consent to proceed.    PROCEDURE IN DETAIL:   The patient was identified and brought to the OR.  Antibiotic prophylaxis and DVT prophylaxis were administered.  They were placed in the comfortable dorsal lithotomy position with care to pad all pressure points.  They were prepped and draped in the usual sterile fashion using hibiclens.  A surgical time out was performed with all in the room in agreement with the correct patient, procedure, indications, and laterality.  A 21-[place] rigid cystoscope was used to enter the bladder.  The bladder was inspected in its entirety and there were no lesions noted.  The ureteral orifices were identified in their normal orthotopic positions.     The Left ureteral orifice was identified and a 5 Fr open ended catheter was placed into the ureteral orifice.    A retrograde pyelogram was performed with injection of 50/50 Isovue with the findings as described above.  The left ureteral stent was left out    The right ureteral stent was then exchanged in the standard fashion.     The patient was placed back supine, awakened from general anesthesia and brought to recovery room in stable condition.    SPECIMENS:   No specimens collected during this procedure.     IMPLANTS:   Implant Name Type Inv. Item Serial No.  Lot No. LRB No. Used Action   STENT URETERAL 6FR 22CM INLAY OPTIMA W/NITINOL GDWR - [id number]  STENT URETERAL 6FR 22CM INLAY OPTIMA W/NITINOL GDWR  [employer] [id number] Right 1 Implanted        COMPLICATIONS: None    DISPOSITION: PACU    PLAN:   -Will schedule follow-up with her advanced practitioner team in 6 weeks with a renal ultrasound  - We will plan for a right ureteral stent exchange in 3 months and assuming she has recovered from her ileostomy closure at that time we will plan for a referral to reconstructive urology.

## 2025-01-17 NOTE — ANESTHESIA PREPROCEDURE EVALUATION
Procedure:  CYSTOSCOPY RETROGRADE PYELOGRAM WITH INSERTION STENT URETERAL (Bilateral: Bladder)  EXCHANGE STENT URETERAL (Bilateral: Ureter)    Relevant Problems   CARDIO   (+) Chest pain   (+) Hemorrhoid   (+) Hemorrhoid prolapse   (+) Hypertension   (+) Nonrheumatic mitral valve regurgitation      GI/HEPATIC   (+) History of rectal cancer (HCC)   (+) Malignant neoplasm of sigmoid colon (HCC)   (+) Partial small bowel obstruction (HCC)      /RENAL   (+) THOR (acute kidney injury) (HCC)   (+) Benign hypertension with chronic kidney disease, stage III (HCC)   (+) Bilateral hydronephrosis   (+) CKD stage G3a/A2, GFR 45-59 and albumin creatinine ratio  mg/g (HCC)   (+) CKD stage G3a/A3, GFR 45-59 and albumin creatinine ratio >300 mg/g (HCC)   (+) Chronic kidney disease-mineral bone disorder (CKD-MBD) with stage 3a chronic kidney disease (HCC)   (+) Hydronephrosis due to ureteral stricture   (+) Nephrolithiasis      HEMATOLOGY   (+) Iron deficiency anemia   (+) Pancytopenia (HCC)      MUSCULOSKELETAL   (+) Back pain      NEURO/PSYCH   (+) Anxiety about health        Physical Exam    Airway    Mallampati score: I  TM Distance: >3 FB  Neck ROM: full     Dental       Cardiovascular  Cardiovascular exam normal    Pulmonary  Pulmonary exam normal     Other Findings  post-pubertal.      Anesthesia Plan  ASA Score- 3     Anesthesia Type- general with ASA Monitors.         Additional Monitors:     Airway Plan: LMA.           Plan Factors-Exercise tolerance (METS): >4 METS.    Chart reviewed. EKG reviewed. Imaging results reviewed. Existing labs reviewed. Patient summary reviewed.    Patient is not a current smoker.  Patient did not smoke on day of surgery.    Obstructive sleep apnea risk education given perioperatively.        Induction- intravenous.    Postoperative Plan- Plan for postoperative opioid use. Planned trial extubation        Informed Consent- Anesthetic plan and risks discussed with patient.  I personally  reviewed this patient with the CRNA. Discussed and agreed on the Anesthesia Plan with the CRNA..      NPO Status:  No vitals data found for the desired time range.

## 2025-01-17 NOTE — H&P
UROLOGY HISTORY AND PHYSICAL     Patient Identifiers: Danielle Haque (MRN [id number])      Date of Service: 1/17/2025        ASSESSMENT:     59 y.o. old female with complex bilateral ureteral stricture disease secondary to advanced stone disease.    Patient understands she will require reconstructive procedure for her right ureter.  She has had endoscopic intervention for her left ureteral strictures and her stent has been maintained thus far in anticipation of needing ureteral identification for her upcoming ileostomy reversal.    I have spoken and coordinated care with Dr. Umanzor who tells me that a left ureteral stent will not be required.  As such I presented the option of removing the left ureteral stent today pending results of her intraoperative retrograde pyelogram.    PLAN:     Noscapine for bilateral ureteral stent manipulation (right exchange, left removal versus exchange).      History of Present Illness:     Danielle Haque is a 59 y.o. old with a history of complex stone disease and ureteral strictures    Past Medical, Past Surgical History:     Past Medical History:   Diagnosis Date    Bleeding from colostomy stoma (HCC) 02/11/2023     states ileostomy , not colostomy    Cancer (HCC)     Colon cancer (HCC)     Elevated serum creatinine 09/11/2024    Fall     Headache     Hemochromatosis, unspecified     History of transfusion     2022 - no adverse reaction    Hyperlipidemia     Hypertension     Hypokalemia 09/12/2024    Kidney calculi     Kidney stone     Liver disease     hemangioma    Muscle weakness     Personal history of COVID-19 12/2022    Sarcoidosis     Seizures (HCC)     2022    Shingles    :    Past Surgical History:   Procedure Laterality Date    ABSCESS DRAINAGE      left breast    BREAST BIOPSY      CHEST TUBE INSERTION      COLON SURGERY      colostomy    COLONOSCOPY      FL GUIDED CENTRAL VENOUS ACCESS DEVICE INSERTION  03/21/2023    FL RETROGRADE PYELOGRAM  01/23/2023    FL  RETROGRADE PYELOGRAM  04/03/2023    FL RETROGRADE PYELOGRAM  7/21/2023    FL RETROGRADE PYELOGRAM  10/11/2023    FL RETROGRADE PYELOGRAM  12/15/2023    FL RETROGRADE PYELOGRAM  2/8/2024    FL RETROGRADE PYELOGRAM  5/10/2024    FL RETROGRADE PYELOGRAM  9/26/2024    FL RETROGRADE PYELOGRAM  10/18/2024    IR BIOPSY LIVER MASS  05/09/2023    LUNG BIOPSY      NC CYSTO W/INSERT URETERAL STENT Bilateral 5/10/2024    Procedure: EXCHANGE STENT URETERAL;  Surgeon: José Luis Stephens MD;  [facility];  Service: Urology    NC CYSTO W/INSERT URETERAL STENT Bilateral 10/18/2024    Procedure: EXCHANGE STENT URETERAL;  Surgeon: José Luis Stephens MD;  [facility];  Service: Urology    NC CYSTO/URETERO W/LITHOTRIPSY &INDWELL STENT INSRT Bilateral 01/23/2023    Procedure: CYSTOSCOPY  RIGHT URETEROSCOPY WITH LITHOTRIPSY HOLMIUM LASER, BILATERAL RETROGRADE PYELOGRAM AND BILATERAL  URETERAL STENT INSERTION;  Surgeon: José Luis Stephens MD;  [facility];  Service: Urology    NC CYSTO/URETERO W/LITHOTRIPSY &INDWELL STENT INSRT Right 04/03/2023    Procedure: RIGHT URETEROSCOPY WITH LITHOTRIPSY HOLMIUM LASER, RETROGRADE PYELOGRAM; BILATERAL EXCHANGE STENT URETERAL;  Surgeon: José Luis Stephens MD;  [facility];  Service: Urology    NC CYSTO/URETERO W/LITHOTRIPSY &INDWELL STENT INSRT Bilateral 10/11/2023    Procedure: CYSTOSCOPY URETEROSCOPY RETROGRADE PYELOGRAM AND INSERTION STENT URETERAL DIAGNOSTINC RIGHT URETEROSCOPY, REMOVAL STENT LEFT SIDE;  Surgeon: José Luis Stephens MD;  [facility];  Service: Urology    NC CYSTO/URETERO W/LITHOTRIPSY &INDWELL STENT INSRT Bilateral 9/26/2024    Procedure: CYSTOSCOPY ,LEFT  URETEROSCOPY,  BILATERAL  RETROGRADE PYELOGRAM AND BILATERAL URETERAL STENT EXCHANGE;  Surgeon: Favian Barber MD;  [facility];  Service: Urology    NC CYSTO/URETERO W/LITHOTRIPSY &INDWELL STENT INSRT Left 10/18/2024    Procedure: CYSTOSCOPY LEFT  URETEROSCOPY, treatment of LEFT ureteral stricture, RETROGRADE PYELOGRAM AND BILATERAL STENT EXCHANGE;  Surgeon: José Luis Stephens MD;  [facility];  Service: Urology    OH CYSTOURETHROSCOPY W/URETERAL CATHETERIZATION Bilateral 07/21/2023    Procedure: CYSTOSCOPY URETEROSCOPY RETROGRADE PYELOGRAM WITH BILATERAL STENT URETERAL EXCHANGE; LEFT LASER LITHOTRIPSY AND STONE BASKET RETRIEVAL;  Surgeon: José Luis Stephens MD;  [facility];  Service: Urology    OH CYSTOURETHROSCOPY W/URETERAL CATHETERIZATION Bilateral 12/15/2023    Procedure: CYSTOSCOPY, BILATERAL  RETROGRADE PYELOGRAM , STENT EXCHANGE RIGHT , LEFT URETEROSCOPY ,  HOLMIUM LASER WITH INSERTION LEFT URETERAL STENT;  Surgeon: Derick Bhakta MD;  [facility];  Service: Urology    OH CYSTOURETHROSCOPY W/URETERAL CATHETERIZATION Left 2/8/2024    Procedure: CYSTOSCOPY B/L RETROGRADE PYELOGRAM WITH B/L T URETERALSTENT EXCHANGE,LEFT URETEROSCOPY, DILATION LEFT URETERAL STICTURE;  Surgeon: José Luis Stephens MD;  [facility];  Service: Urology    OH CYSTOURETHROSCOPY W/URETERAL CATHETERIZATION Bilateral 5/10/2024    Procedure: CYSTOSCOPY RETROGRADE PYELOGRAM WITH INSERTION STENT URETERAL;  Surgeon: José Luis Stephens MD;  [facility];  Service: Urology    OH INSJ TUNNELED CTR VAD W/SUBQ PORT AGE 5 YR/> N/A 03/21/2023    Procedure: INSERTION VENOUS PORT ( PORT-A-CATH) IR;  Surgeon: Mark Amos DO;  [facility];  Service: Interventional Radiology    OH LAPS COLECTOMY PRTL W/COLOPXTSTMY LW ANAST N/A 8/17/2023    Procedure: RESECTION COLON LOW ANTERIOR LAPAROSCOPIC WITH ROBOTICS;  Surgeon: Sree Umanzor MD;  [facility];  Service: Colorectal    TONSILLECTOMY      URINARY SURGERY Bilateral     bilateral stents   :    Medications, Allergies:     Current Facility-Administered Medications:     [START ON 1/18/2025] ceFAZolin (ANCEF) IVPB (premix in dextrose) 2,000 mg 50 mL,  "2,000 mg, Intravenous, On Call To OR, José Luis Stephens MD    Allergies:  Allergies   Allergen Reactions    Oxaliplatin Shortness Of Breath     Reactions occurred with 2nd and 3rd infusions (about 1-3 hours from initiation of infusion) and required treatment with steroids and antihistamines. Please refer to allergy note on 2/7/2023 for detailed description of her reactions.    Morphine Nausea Only     \"Every dose made me nauseous\" (oral dosing only, can tolerate IV morphine)    Potassium Chloride Other (See Comments)     Pt reports \"burning\" with Iv potassium administration and wishes for it to be added to chart   :    Social and Family History:   Social History:   Social History     Tobacco Use    Smoking status: Never     Passive exposure: Past    Smokeless tobacco: Never   Vaping Use    Vaping status: Never Used   Substance Use Topics    Alcohol use: Never    Drug use: Never   .    Social History     Tobacco Use   Smoking Status Never    Passive exposure: Past   Smokeless Tobacco Never       Family History:  Family History   Problem Relation Age of Onset    Cancer Mother     Cirrhosis Father     Diabetes Father     Breast cancer Neg Hx     Breast cancer additional onset Neg Hx    :     Review of Systems:     General: Fever, chills, or night sweats: negative  Cardiac: Negative for chest pain.    Pulmonary: Negative for shortness of breath.  Gastrointestinal: Abdominal pain negative  Nausea, vomiting, or diarrhea negative  Genitourinary: See HPI above.  Patient does nothave hematuria.  All other systems queried were negative.    Physical Exam:   General: Patient is pleasant and in NAD. Awake and alert  BP 99/50   Pulse 100   Temp 97.8 °F (36.6 °C) (Temporal)   Resp 18   Ht 5' 2\" (1.575 m)   Wt 53.1 kg (117 lb)   SpO2 100%   BMI 21.40 kg/m²   HEENT:  Normocephalic atraumatic  Cardiac:  Regular rate and rhythm, Peripheral edema: negative  Pulmonary: Non-labored breathing, CTAB  Abdomen: Soft, non-tender, " non-distended.  No surgical scars.  No masses, tenderness, hernias noted.    Genitourinary: negative CVA tenderness, neg suprapubic tenderness.  Extremities: normal movement in all 4       Labs:     Lab Results   Component Value Date    HGB 7.5 (L) 12/23/2024    HCT 23.0 (L) 12/23/2024    WBC 2.35 (L) 12/23/2024     (L) 12/23/2024   ]    Lab Results   Component Value Date    K 3.7 12/22/2024     12/22/2024    CO2 24 12/22/2024    BUN 30 (H) 12/22/2024    CREATININE 1.05 12/22/2024    CALCIUM 10.0 12/22/2024   ]    Imaging:   I personally reviewed the images and report of the following studies, and reviewed them with the patient:        Thank you for allowing me to participate in this patients’ care.  Please do not hesitate to call with any additional questions.  José Luis Stephens MD

## 2025-01-17 NOTE — TELEPHONE ENCOUNTER
Please schedule renal ultrasound in 6 weeks and follow-up with advanced practitioner at that time    Joellen please also help to schedule her for right ureteral stent exchange in 3 months.  Left ureteral stent was removed today

## 2025-01-17 NOTE — ANESTHESIA POSTPROCEDURE EVALUATION
Post-Op Assessment Note    CV Status:  Stable    Pain management: adequate       Mental Status:  Alert   Hydration Status:  Stable   PONV Controlled:  None   Airway Patency:  Patent and adequate     Post Op Vitals Reviewed: Yes    No anethesia notable event occurred.    Staff: CRNA           Last Filed PACU Vitals:  Vitals Value Taken Time   Temp 97.9 °F (36.6 °C) 01/17/25 1421   Pulse 88 01/17/25 1422   /55 01/17/25 1421   Resp 16 01/17/25 1421   SpO2 100 % 01/17/25 1422   Vitals shown include unfiled device data.    Modified Say:     Vitals Value Taken Time   Activity 2 01/17/25 1421   Respiration 2 01/17/25 1421   Circulation 2 01/17/25 1421   Consciousness 1 01/17/25 1421   Oxygen Saturation 2 01/17/25 1421     Modified Say Score: 9

## 2025-01-18 NOTE — ANESTHESIA POSTPROCEDURE EVALUATION
Post-Op Assessment Note            No anethesia notable event occurred.            Last Filed PACU Vitals:  Vitals Value Taken Time   Temp 97.3 °F (36.3 °C) 01/17/25 1445   Pulse 84 01/17/25 1446   /52 01/17/25 1445   Resp 26 01/17/25 1446   SpO2 100 % 01/17/25 1446   Vitals shown include unfiled device data.    Modified Say:     Vitals Value Taken Time   Activity 2 01/17/25 1445   Respiration 2 01/17/25 1445   Circulation 2 01/17/25 1445   Consciousness 2 01/17/25 1445   Oxygen Saturation 2 01/17/25 1445     Modified Say Score: 10

## 2025-01-20 NOTE — TELEPHONE ENCOUNTER
Post Op Note    Danielle Haque is a 59 y.o. female s/p CYSTOSCOPY RETROGRADE PYELOGRAM WITH INSERTION STENT URETERAL, LEFT RETROGRADE PYELOGRAM AND STENT REMOVAL (Right: Bladder)  performed 1/17/25.  Danielle Haque is a patient of Dr. Dr. Stephens and is seen at the [place] office.     How would you rate your pain on a scale from 1 to 10, 10 being the worst pain ever? 0  Have you had a fever? No  Do you have any difficulty urinating? No    Do you have any other questions or concerns that I can address at this time?   -Went over potential/expected post op symptoms   -discussed ER precautions  -Confirmed post op appts and locations   -Went over medication      Spoke with  and he reports patient is doing well post op. Confirmed plan with him and he knows she will need a follow up in 6 weeks with an US one to two weeks prior. Will call the office with any concerns in the interim.

## 2025-01-20 NOTE — TELEPHONE ENCOUNTER
Holding 4/11/25 at [facility] for pt.'s next stent exchange with Dr. Stephens. I will call pt to schedule sometime in February 2025.

## 2025-01-21 DIAGNOSIS — D86.9 SARCOIDOSIS: ICD-10-CM

## 2025-01-21 DIAGNOSIS — R74.8 ELEVATED LIVER ENZYMES: ICD-10-CM

## 2025-01-21 DIAGNOSIS — D86.89 HEPATIC GRANULOMA ASSOCIATED WITH SARCOIDOSIS: ICD-10-CM

## 2025-01-21 RX ORDER — FENOFIBRATE 145 MG/1
145 TABLET, COATED ORAL DAILY
Qty: 30 TABLET | Refills: 0 | Status: SHIPPED | OUTPATIENT
Start: 2025-01-21 | End: 2025-01-21

## 2025-01-21 RX ORDER — FENOFIBRATE 145 MG/1
145 TABLET, COATED ORAL DAILY
Qty: 90 TABLET | Refills: 3 | Status: SHIPPED | OUTPATIENT
Start: 2025-01-21 | End: 2026-01-21

## 2025-01-22 ENCOUNTER — HOSPITAL ENCOUNTER (OUTPATIENT)
Dept: INFUSION CENTER | Facility: CLINIC | Age: 60
Discharge: HOME | End: 2025-01-22
Payer: COMMERCIAL

## 2025-01-22 DIAGNOSIS — Z95.828 PORT-A-CATH IN PLACE: Primary | ICD-10-CM

## 2025-01-22 DIAGNOSIS — Z51.5 PALLIATIVE CARE PATIENT: ICD-10-CM

## 2025-01-22 DIAGNOSIS — C18.7 MALIGNANT NEOPLASM OF SIGMOID COLON (HCC): ICD-10-CM

## 2025-01-22 DIAGNOSIS — D86.9 SARCOIDOSIS: ICD-10-CM

## 2025-01-22 LAB
ALBUMIN SERPL BCG-MCNC: 4 G/DL (ref 3.5–5)
ALP SERPL-CCNC: 152 U/L (ref 34–104)
ALT SERPL W P-5'-P-CCNC: 25 U/L (ref 7–52)
ANION GAP SERPL CALCULATED.3IONS-SCNC: 9 MMOL/L (ref 4–13)
AST SERPL W P-5'-P-CCNC: 22 U/L (ref 13–39)
BASOPHILS # BLD AUTO: 0.02 THOUSANDS/ΜL (ref 0–0.1)
BASOPHILS NFR BLD AUTO: 0 % (ref 0–1)
BILIRUB SERPL-MCNC: 0.56 MG/DL (ref 0.2–1)
BUN SERPL-MCNC: 26 MG/DL (ref 5–25)
CALCIUM SERPL-MCNC: 9.9 MG/DL (ref 8.4–10.2)
CHLORIDE SERPL-SCNC: 107 MMOL/L (ref 96–108)
CO2 SERPL-SCNC: 23 MMOL/L (ref 21–32)
CREAT SERPL-MCNC: 1.25 MG/DL (ref 0.6–1.3)
CRP SERPL QL: 31.1 MG/L
EOSINOPHIL # BLD AUTO: 0.29 THOUSAND/ΜL (ref 0–0.61)
EOSINOPHIL NFR BLD AUTO: 4 % (ref 0–6)
ERYTHROCYTE [DISTWIDTH] IN BLOOD BY AUTOMATED COUNT: 16.3 % (ref 11.6–15.1)
ERYTHROCYTE [SEDIMENTATION RATE] IN BLOOD: 57 MM/HOUR (ref 0–29)
GFR SERPL CREATININE-BSD FRML MDRD: 47 ML/MIN/1.73SQ M
GLUCOSE SERPL-MCNC: 102 MG/DL (ref 65–140)
HCT VFR BLD AUTO: 25.6 % (ref 34.8–46.1)
HGB BLD-MCNC: 8.4 G/DL (ref 11.5–15.4)
IMM GRANULOCYTES # BLD AUTO: 0.02 THOUSAND/UL (ref 0–0.2)
IMM GRANULOCYTES NFR BLD AUTO: 0 % (ref 0–2)
LYMPHOCYTES # BLD AUTO: 0.31 THOUSANDS/ΜL (ref 0.6–4.47)
LYMPHOCYTES NFR BLD AUTO: 5 % (ref 14–44)
MCH RBC QN AUTO: 31.1 PG (ref 26.8–34.3)
MCHC RBC AUTO-ENTMCNC: 32.8 G/DL (ref 31.4–37.4)
MCV RBC AUTO: 95 FL (ref 82–98)
MONOCYTES # BLD AUTO: 0.42 THOUSAND/ΜL (ref 0.17–1.22)
MONOCYTES NFR BLD AUTO: 6 % (ref 4–12)
NEUTROPHILS # BLD AUTO: 5.73 THOUSANDS/ΜL (ref 1.85–7.62)
NEUTS SEG NFR BLD AUTO: 85 % (ref 43–75)
NRBC BLD AUTO-RTO: 0 /100 WBCS
PLATELET # BLD AUTO: 303 THOUSANDS/UL (ref 149–390)
PMV BLD AUTO: 8.8 FL (ref 8.9–12.7)
POTASSIUM SERPL-SCNC: 3.9 MMOL/L (ref 3.5–5.3)
PROT SERPL-MCNC: 7.2 G/DL (ref 6.4–8.4)
RBC # BLD AUTO: 2.7 MILLION/UL (ref 3.81–5.12)
SODIUM SERPL-SCNC: 139 MMOL/L (ref 135–147)
WBC # BLD AUTO: 6.79 THOUSAND/UL (ref 4.31–10.16)

## 2025-01-22 PROCEDURE — 86140 C-REACTIVE PROTEIN: CPT

## 2025-01-22 PROCEDURE — 80053 COMPREHEN METABOLIC PANEL: CPT

## 2025-01-22 PROCEDURE — 85025 COMPLETE CBC W/AUTO DIFF WBC: CPT

## 2025-01-22 PROCEDURE — 85652 RBC SED RATE AUTOMATED: CPT

## 2025-01-22 NOTE — PROGRESS NOTES
Tolerated procedure without incident: No adverse reactions noted: Verified follow up appt with patient ( 03/05/25 ): AVS offered and declined

## 2025-01-23 ENCOUNTER — NURSE TRIAGE (OUTPATIENT)
Age: 60
End: 2025-01-23

## 2025-01-23 RX ORDER — PSEUDOEPHED/ACETAMINOPH/DIPHEN 30MG-500MG
TABLET ORAL
Qty: 120 TABLET | Refills: 0 | Status: SHIPPED | OUTPATIENT
Start: 2025-01-23

## 2025-01-23 NOTE — TELEPHONE ENCOUNTER
Returned call to patient's  for more clarification. Patient had some flex of stool per the rectum several times since yesterday.  I advised this is normal and may happen from time to time.   Patient had one episode of blood in the ostomy bag but she is unsure if it came from the ileostomy opening or from around the stoma. She will contact colon and rectal surgery if this continues to happen. Her ileostomy output is very loose brown liquid.  She has some lower abdominal cramping.  The cramping comes and goes but only lasts for a few moments.  She does have cramping that is present often when she sits to urinate.  Patient advised to speak with her Urologist as she recently has some urology procedures.  Patient denies any fevers or rectal bleeding. She is s/p RESECTION COLON LOW ANTERIOR LAPAROSCOPIC WITH ROBOTICS  8/17/23 and  12/1/2022 Laparoscopic diverting colostomy in [place].    Patient advised to contact colorectal surgery if she continues to have mucus and stool per the rectum for more than several consecutive days.    Patient also has a colonoscopy scheduled for 2/3 and would like to have someone call her to discuss if she needs to prep or just fast the day prior.

## 2025-01-24 NOTE — TELEPHONE ENCOUNTER
Spoke to pt's  and informed she may be on clear liquids day prior to fc, and  NPO after midnight. May take necessary morning meds with a sip of water day of fc.

## 2025-01-28 ENCOUNTER — OFFICE VISIT (OUTPATIENT)
Dept: PALLIATIVE MEDICINE | Facility: CLINIC | Age: 60
End: 2025-01-28
Payer: COMMERCIAL

## 2025-01-28 VITALS
WEIGHT: 112.5 LBS | TEMPERATURE: 97.4 F | DIASTOLIC BLOOD PRESSURE: 62 MMHG | SYSTOLIC BLOOD PRESSURE: 98 MMHG | BODY MASS INDEX: 20.7 KG/M2 | HEIGHT: 62 IN

## 2025-01-28 DIAGNOSIS — Z96.0 URETERAL STENT PRESENT: ICD-10-CM

## 2025-01-28 DIAGNOSIS — Z51.5 PALLIATIVE CARE PATIENT: ICD-10-CM

## 2025-01-28 DIAGNOSIS — N13.5 URETERAL STRICTURE: ICD-10-CM

## 2025-01-28 DIAGNOSIS — C20 RECTAL CANCER (HCC): Primary | ICD-10-CM

## 2025-01-28 PROCEDURE — 99214 OFFICE O/P EST MOD 30 MIN: CPT | Performed by: STUDENT IN AN ORGANIZED HEALTH CARE EDUCATION/TRAINING PROGRAM

## 2025-01-28 NOTE — PROGRESS NOTES
Name: Danielle Haque      : 1965      MRN: [id number]  Encounter Provider: Jhon Banks DO  Encounter Date: 2025   Encounter department: [facility]  :  Assessment & Plan  History of rectal cancer (HCC)  BYRON status  -pending colostomy reversal with Colorectal surgery   -patient is scheduled to have a colonoscopy first for evaluation.    Still has discomfort around the ostomy site where she states this can be a burning pain. However, oxycodone has been helpful for this discomfort as well. She tries to use this sparingly as she has been using APAP at 500mg PRN. Discussed 3,000mg maximum in a 24 hour period with APAP and not to exceed.    Maintain OxyIR 5mg q8 hours PRN dosing for the interim given her intermittent stoma discomfort from time to time.  May consider neuropathic agents in the future given her complaint of a burning sensation.         Palliative care patient  Psychosocial   Supportive listening provided  Normalized experience of patient/family  Provided anxiety containment     Referrals Placed / Medical Equipment Ordered  -None    Follow-Up Recommendations  -Follow-up with PCP and current medical specialists  -Follow-up with palliative care: 6 weeks         Ureteral stent present  S/p ureteral stent placed on 2025 with removal of left ureteral stent.  Since procedure, patient has had significant improvement of her pain particularly to her flanks and back.  She has only needed to occasionally take oxycodone (1 tablet on select day with more discomfort).    Hopefully we can further wean and get to a point where we can discontinue opioids in the future.       Ureteral stricture           PDMP Review: I have reviewed the patient's controlled substance dispensing history in the Prescription Drug Monitoring Program in compliance with the [facility] regulations before prescribing any controlled substances.    History of Present Illness   Danielle Haque is a 59 y.o. female who  presents for follow-up.    Palliative Diagnosis - history of rectal cancer, currently BYRON Status.    Medical History Reviewed by provider this encounter:     .  Past Medical History   Past Medical History:   Diagnosis Date    Bleeding from colostomy stoma (HCC) 02/11/2023     states ileostomy , not colostomy    Cancer (HCC)     Colon cancer (HCC)     Elevated serum creatinine 09/11/2024    Fall     Headache     Hemochromatosis, unspecified     History of transfusion     2022 - no adverse reaction    Hyperlipidemia     Hypertension     Hypokalemia 09/12/2024    Kidney calculi     Kidney stone     Liver disease     hemangioma    Muscle weakness     Personal history of COVID-19 12/2022    Sarcoidosis     Seizures (HCC)     2022    Shingles      Past Surgical History:   Procedure Laterality Date    ABSCESS DRAINAGE      left breast    BREAST BIOPSY      CHEST TUBE INSERTION      COLON SURGERY      colostomy    COLONOSCOPY      FL GUIDED CENTRAL VENOUS ACCESS DEVICE INSERTION  03/21/2023    FL RETROGRADE PYELOGRAM  01/23/2023    FL RETROGRADE PYELOGRAM  04/03/2023    FL RETROGRADE PYELOGRAM  7/21/2023    FL RETROGRADE PYELOGRAM  10/11/2023    FL RETROGRADE PYELOGRAM  12/15/2023    FL RETROGRADE PYELOGRAM  2/8/2024    FL RETROGRADE PYELOGRAM  5/10/2024    FL RETROGRADE PYELOGRAM  9/26/2024    FL RETROGRADE PYELOGRAM  10/18/2024    FL RETROGRADE PYELOGRAM  1/17/2025    IR BIOPSY LIVER MASS  05/09/2023    LUNG BIOPSY      NE CYSTO W/INSERT URETERAL STENT Bilateral 5/10/2024    Procedure: EXCHANGE STENT URETERAL;  Surgeon: José Luis Stephens MD;  [facility];  Service: Urology    NE CYSTO W/INSERT URETERAL STENT Bilateral 10/18/2024    Procedure: EXCHANGE STENT URETERAL;  Surgeon: José Luis Stephens MD;  [facility];  Service: Urology    NE CYSTO/URETERO W/LITHOTRIPSY &INDWELL STENT INSRT Bilateral 01/23/2023    Procedure: CYSTOSCOPY  RIGHT URETEROSCOPY WITH LITHOTRIPSY HOLMIUM LASER,  BILATERAL RETROGRADE PYELOGRAM AND BILATERAL  URETERAL STENT INSERTION;  Surgeon: José Luis Stephens MD;  [facility];  Service: Urology    IL CYSTO/URETERO W/LITHOTRIPSY &INDWELL STENT INSRT Right 04/03/2023    Procedure: RIGHT URETEROSCOPY WITH LITHOTRIPSY HOLMIUM LASER, RETROGRADE PYELOGRAM; BILATERAL EXCHANGE STENT URETERAL;  Surgeon: José Luis Stephens MD;  [facility];  Service: Urology    IL CYSTO/URETERO W/LITHOTRIPSY &INDWELL STENT INSRT Bilateral 10/11/2023    Procedure: CYSTOSCOPY URETEROSCOPY RETROGRADE PYELOGRAM AND INSERTION STENT URETERAL DIAGNOSTINC RIGHT URETEROSCOPY, REMOVAL STENT LEFT SIDE;  Surgeon: José Luis Stephens MD;  [facility];  Service: Urology    IL CYSTO/URETERO W/LITHOTRIPSY &INDWELL STENT INSRT Bilateral 9/26/2024    Procedure: CYSTOSCOPY ,LEFT  URETEROSCOPY,  BILATERAL  RETROGRADE PYELOGRAM AND BILATERAL URETERAL STENT EXCHANGE;  Surgeon: Favian Barber MD;  [facility];  Service: Urology    IL CYSTO/URETERO W/LITHOTRIPSY &INDWELL STENT INSRT Left 10/18/2024    Procedure: CYSTOSCOPY LEFT URETEROSCOPY, treatment of LEFT ureteral stricture, RETROGRADE PYELOGRAM AND BILATERAL STENT EXCHANGE;  Surgeon: José Luis Stephens MD;  [facility];  Service: Urology    IL CYSTOURETHROSCOPY W/URETERAL CATHETERIZATION Bilateral 07/21/2023    Procedure: CYSTOSCOPY URETEROSCOPY RETROGRADE PYELOGRAM WITH BILATERAL STENT URETERAL EXCHANGE; LEFT LASER LITHOTRIPSY AND STONE BASKET RETRIEVAL;  Surgeon: José Luis Stephens MD;  [facility];  Service: Urology    IL CYSTOURETHROSCOPY W/URETERAL CATHETERIZATION Bilateral 12/15/2023    Procedure: CYSTOSCOPY, BILATERAL  RETROGRADE PYELOGRAM , STENT EXCHANGE RIGHT , LEFT URETEROSCOPY ,  HOLMIUM LASER WITH INSERTION LEFT URETERAL STENT;  Surgeon: Derick Bhakta MD;  [facility];  Service: Urology    IL CYSTOURETHROSCOPY W/URETERAL CATHETERIZATION Left 2/8/2024     Procedure: CYSTOSCOPY B/L RETROGRADE PYELOGRAM WITH B/L T URETERALSTENT EXCHANGE,LEFT URETEROSCOPY, DILATION LEFT URETERAL STICTURE;  Surgeon: José Luis Stephens MD;  [facility];  Service: Urology    RI CYSTOURETHROSCOPY W/URETERAL CATHETERIZATION Bilateral 5/10/2024    Procedure: CYSTOSCOPY RETROGRADE PYELOGRAM WITH INSERTION STENT URETERAL;  Surgeon: José Luis Stephens MD;  [facility];  Service: Urology    RI CYSTOURETHROSCOPY W/URETERAL CATHETERIZATION Right 1/17/2025    Procedure: CYSTOSCOPY RETROGRADE PYELOGRAM WITH INSERTION STENT URETERAL, LEFT RETROGRADE PYELOGRAM AND STENT REMOVAL;  Surgeon: José Luis Stephens MD;  [facility];  Service: Urology    RI INSJ TUNNELED CTR VAD W/SUBQ PORT AGE 5 YR/> N/A 03/21/2023    Procedure: INSERTION VENOUS PORT ( PORT-A-CATH) IR;  Surgeon: Mark Amos DO;  [facility];  Service: Interventional Radiology    RI LAPS COLECTOMY PRTL W/COLOPXTSTMY LW ANAST N/A 8/17/2023    Procedure: RESECTION COLON LOW ANTERIOR LAPAROSCOPIC WITH ROBOTICS;  Surgeon: Sree Umanzor MD;  [facility];  Service: Colorectal    TONSILLECTOMY      URINARY SURGERY Bilateral     bilateral stents     Family History   Problem Relation Age of Onset    Cancer Mother     Cirrhosis Father     Diabetes Father     Breast cancer Neg Hx     Breast cancer additional onset Neg Hx       reports that she has never smoked. She has been exposed to tobacco smoke. She has never used smokeless tobacco. She reports that she does not drink alcohol and does not use drugs.  Current Outpatient Medications on File Prior to Visit   Medication Sig Dispense Refill    Acetaminophen Extra Strength 500 MG TABS TAKE 1 TABLET (500 MG TOTAL) BY MOUTH EVERY 6 (SIX) HOURS AS NEEDED FOR MILD PAIN 120 tablet 0    clotrimazole (GYNE-LOTRIMIN) 1 % vaginal cream Insert 1 applicator into the vagina daily at bedtime 45 g 0    cyanocobalamin (VITAMIN B-12) 1000 MCG tablet Take  1,000 mcg by mouth daily      estrogens, conjugated (Premarin) vaginal cream Insert 0.3 g into the vagina 3 (three) times a week 30 g 6    fenofibrate (TRICOR) 145 mg tablet Take 1 tablet (145 mg total) by mouth daily 90 tablet 3    ferrous sulfate 324 (65 Fe) mg Take 1 tablet (324 mg total) by mouth daily before breakfast 30 tablet 0    folic acid (FOLVITE) 1 mg tablet Take 1 tablet (1 mg total) by mouth daily 30 tablet 5    lidocaine (XYLOCAINE) 5 % ointment Apply topically as needed for mild pain 30 g 2    lisinopril (ZESTRIL) 5 mg tablet Take 1 tablet (5 mg total) by mouth daily 90 tablet 1    methotrexate 2.5 MG tablet Take 6 tablets (15 mg total) by mouth once a week 72 tablet 2    oxybutynin (DITROPAN) 5 mg tablet Take 1 tablet (5 mg total) by mouth every 6 (six) hours as needed (bladder spasms) 30 tablet 0    oxyCODONE (Roxicodone) 5 immediate release tablet Take 1 tablet (5 mg total) by mouth every 8 (eight) hours as needed for moderate pain Max Daily Amount: 15 mg 90 tablet 0    tamsulosin (FLOMAX) 0.4 mg Take 1 capsule (0.4 mg total) by mouth daily with dinner 90 capsule 3    ursodiol (ACTIGALL) 300 mg capsule TAKE 3 CAPSULES (900 MG TOTAL) BY MOUTH IN THE MORNING 90 capsule 11    acetaminophen (TYLENOL) 325 mg tablet Take 2 tablets (650 mg total) by mouth every 4 (four) hours as needed for mild pain (Patient not taking: Reported on 1/28/2025)      docusate sodium (COLACE) 100 mg capsule Take 1 capsule (100 mg total) by mouth 2 (two) times a day for 15 days 30 capsule 0    hydrocortisone (ANUSOL-HC) 2.5 % rectal cream Apply topically 2 (two) times a day for 5 days 28 g 0    naproxen (NAPROSYN) 500 mg tablet Take 1 tablet (500 mg total) by mouth 2 (two) times a day with meals for 5 days For pain 10 tablet 0    ondansetron (ZOFRAN) 4 mg tablet Take 1 tablet (4 mg total) by mouth every 8 (eight) hours as needed for nausea or vomiting (Patient not taking: Reported on 1/15/2025) 20 tablet 2     No current  "facility-administered medications on file prior to visit.     Allergies   Allergen Reactions    Oxaliplatin Shortness Of Breath     Reactions occurred with 2nd and 3rd infusions (about 1-3 hours from initiation of infusion) and required treatment with steroids and antihistamines. Please refer to allergy note on 2/7/2023 for detailed description of her reactions.    Morphine Nausea Only     \"Every dose made me nauseous\" (oral dosing only, can tolerate IV morphine)    Potassium Chloride Other (See Comments)     Pt reports \"burning\" with Iv potassium administration and wishes for it to be added to chart      Current Outpatient Medications on File Prior to Visit   Medication Sig Dispense Refill    Acetaminophen Extra Strength 500 MG TABS TAKE 1 TABLET (500 MG TOTAL) BY MOUTH EVERY 6 (SIX) HOURS AS NEEDED FOR MILD PAIN 120 tablet 0    clotrimazole (GYNE-LOTRIMIN) 1 % vaginal cream Insert 1 applicator into the vagina daily at bedtime 45 g 0    cyanocobalamin (VITAMIN B-12) 1000 MCG tablet Take 1,000 mcg by mouth daily      estrogens, conjugated (Premarin) vaginal cream Insert 0.3 g into the vagina 3 (three) times a week 30 g 6    fenofibrate (TRICOR) 145 mg tablet Take 1 tablet (145 mg total) by mouth daily 90 tablet 3    ferrous sulfate 324 (65 Fe) mg Take 1 tablet (324 mg total) by mouth daily before breakfast 30 tablet 0    folic acid (FOLVITE) 1 mg tablet Take 1 tablet (1 mg total) by mouth daily 30 tablet 5    lidocaine (XYLOCAINE) 5 % ointment Apply topically as needed for mild pain 30 g 2    lisinopril (ZESTRIL) 5 mg tablet Take 1 tablet (5 mg total) by mouth daily 90 tablet 1    methotrexate 2.5 MG tablet Take 6 tablets (15 mg total) by mouth once a week 72 tablet 2    oxybutynin (DITROPAN) 5 mg tablet Take 1 tablet (5 mg total) by mouth every 6 (six) hours as needed (bladder spasms) 30 tablet 0    oxyCODONE (Roxicodone) 5 immediate release tablet Take 1 tablet (5 mg total) by mouth every 8 (eight) hours as needed " "for moderate pain Max Daily Amount: 15 mg 90 tablet 0    tamsulosin (FLOMAX) 0.4 mg Take 1 capsule (0.4 mg total) by mouth daily with dinner 90 capsule 3    ursodiol (ACTIGALL) 300 mg capsule TAKE 3 CAPSULES (900 MG TOTAL) BY MOUTH IN THE MORNING 90 capsule 11    acetaminophen (TYLENOL) 325 mg tablet Take 2 tablets (650 mg total) by mouth every 4 (four) hours as needed for mild pain (Patient not taking: Reported on 1/28/2025)      docusate sodium (COLACE) 100 mg capsule Take 1 capsule (100 mg total) by mouth 2 (two) times a day for 15 days 30 capsule 0    hydrocortisone (ANUSOL-HC) 2.5 % rectal cream Apply topically 2 (two) times a day for 5 days 28 g 0    naproxen (NAPROSYN) 500 mg tablet Take 1 tablet (500 mg total) by mouth 2 (two) times a day with meals for 5 days For pain 10 tablet 0    ondansetron (ZOFRAN) 4 mg tablet Take 1 tablet (4 mg total) by mouth every 8 (eight) hours as needed for nausea or vomiting (Patient not taking: Reported on 1/15/2025) 20 tablet 2     No current facility-administered medications on file prior to visit.      Social History     Tobacco Use    Smoking status: Never     Passive exposure: Past    Smokeless tobacco: Never   Vaping Use    Vaping status: Never Used   Substance and Sexual Activity    Alcohol use: Never    Drug use: Never    Sexual activity: Not Currently     Partners: Male     Birth control/protection: Abstinence, Post-menopausal        Objective   BP 98/62 Comment: rechecked manually by provider of right upper extremity  Temp (!) 97.4 °F (36.3 °C) (Temporal)   Ht 5' 2\" (1.575 m)   Wt 51 kg (112 lb 8 oz)   BMI 20.58 kg/m²     Physical Exam  Vitals reviewed.   Constitutional:       General: She is not in acute distress.     Appearance: She is not ill-appearing, toxic-appearing or diaphoretic.   HENT:      Head: Normocephalic and atraumatic.      Nose: Nose normal.      Mouth/Throat:      Mouth: Mucous membranes are moist.   Eyes:      General:         Right eye: No " discharge.         Left eye: No discharge.   Cardiovascular:      Rate and Rhythm: Normal rate.   Pulmonary:      Effort: Pulmonary effort is normal. No respiratory distress.      Breath sounds: No wheezing.   Abdominal:      General: Abdomen is flat. There is no distension.   Musculoskeletal:         General: No swelling.   Skin:     General: Skin is warm and dry.      Coloration: Skin is not jaundiced or pale.   Neurological:      General: No focal deficit present.      Mental Status: She is alert. Mental status is at baseline.   Psychiatric:         Mood and Affect: Mood normal.         Behavior: Behavior normal.         Thought Content: Thought content normal.         Judgment: Judgment normal.      Comments: Much improved affect today and was smiling. Patient glad that she has gained relief with her recent Urologic procedures and the level of pain has improved greatly relative to what it was weeks ago.         Recent labs:  Lab Results   Component Value Date/Time    SODIUM 139 01/22/2025 10:05 AM    K 3.9 01/22/2025 10:05 AM    BUN 26 (H) 01/22/2025 10:05 AM    CREATININE 1.25 01/22/2025 10:05 AM    GLUC 102 01/22/2025 10:05 AM    CALCIUM 9.9 01/22/2025 10:05 AM    AST 22 01/22/2025 10:05 AM    ALT 25 01/22/2025 10:05 AM    ALB 4.0 01/22/2025 10:05 AM    TP 7.2 01/22/2025 10:05 AM    EGFR 47 01/22/2025 10:05 AM     Lab Results   Component Value Date/Time    HGB 8.4 (L) 01/22/2025 10:05 AM    WBC 6.79 01/22/2025 10:05 AM     01/22/2025 10:05 AM    INR 1.00 12/09/2024 08:18 AM    PTT 27 03/02/2024 03:57 AM     Lab Results   Component Value Date/Time    BKO8ZQMFIYEX 1.063 09/13/2024 06:30 AM       Recent Imaging:  Procedure: FL retrograde pyelogram  Result Date: 1/17/2025  Narrative: BILATERAL RETROGRADE PYELOGRAM INDICATION: CYSTOSCOPY RETROGRADE PYELOGRAM WITH INSERTION STENT URETERAL - BILATERAL. COMPARISON: None IMAGES: 8 FLUOROSCOPY TIME: 45sft CONTRAST: 17 mL of iohexol (OMNIPAQUE) FINDINGS:  Interpreting radiologist not present for procedure.  Images show procedure in progress. Right ureteral stent. Left hydronephrosis. Right lower quadrant ostomy. Osseous and soft tissue detail limited by technique.     Impression: Fluoroscopic guidance provided for bilateral retrograde pyelogram. Please see separate procedure report for additional details. Workstation performed: [id number]     Procedure: CT abdomen pelvis with contrast  Result Date: 12/20/2024  Narrative: CT ABDOMEN AND PELVIS WITH IV CONTRAST INDICATION: pelvic pain + UTI + stents - states pain feels same when stents get clogged. . COMPARISON: CT of the abdomen pelvis November 15, 2024. TECHNIQUE: CT examination of the abdomen and pelvis was performed. Multiplanar 2D reformatted images were created from the source data. This examination, like all CT scans performed in the [facility], was performed utilizing techniques to minimize radiation dose exposure, including the use of iterative reconstruction and automated exposure control. Radiation dose length product (DLP) for this visit: 339 mGy-cm IV Contrast: 85 mL of iohexol (OMNIPAQUE) Enteric Contrast: Not administered. FINDINGS: ABDOMEN LOWER CHEST: Multiple bibasilar nodules are stable compared with prior examination. LIVER/BILIARY TREE: Unremarkable. GALLBLADDER: No calcified gallstones. No pericholecystic inflammatory change. Trace gallbladder wall thickening similar to prior exam, nonspecific. SPLEEN: Unremarkable. PANCREAS: Unremarkable. ADRENAL GLANDS: Unremarkable. KIDNEYS/URETERS: Multiple calculi within the lower pole of the right kidney measuring up to 5 mm. There is a ureteral stent extending from the right renal pelvis to the bladder. A few calculi are seen adjacent to the proximal stent measuring up to 3 mm (series 601, image 85). There is mild right hydronephrosis. Punctate calculi are seen within the left kidney. There is a left-sided ureteral stent extending from  the renal pelvis to the bladder. A 3 mm calculus is seen adjacent to the stent in the distal  ureter (series 302, image 102). There is mild left hydronephrosis. There is periureteral fat stranding bilaterally. STOMACH AND BOWEL:  There is a right lower quadrant ileostomy. No bowel obstruction. APPENDIX: No findings to suggest appendicitis. ABDOMINOPELVIC CAVITY: No ascites. No pneumoperitoneum. No lymphadenopathy. VESSELS: Unremarkable for patient's age. PELVIS REPRODUCTIVE ORGANS: Unremarkable for patient's age. URINARY BLADDER: Unremarkable. ABDOMINAL WALL/INGUINAL REGIONS: Unremarkable. BONES: No acute fracture or suspicious osseous lesion.     Impression: Bilateral nephrolithiasis. Bilateral ureteral stents in place. There is bilateral mild hydronephrosis. Bilateral periureteral fat stranding, correlate with urinalysis for infection. Workstation performed: [id number]     Procedure: POCT spirometry  Impression: 12/02/2024 - Ratio 89%, FVC 2.93L (93%, Z -0.55), FEV1 2.60L (106%, Z 0.44) - normal spirometry    Procedure: CT abdomen pelvis with contrast  Result Date: 11/15/2024  Narrative: CT ABDOMEN AND PELVIS WITH IV CONTRAST INDICATION: sudden onset right flank pain x2h, hx of kidney stones, ureteral stents, sigmoid colon cancer with ostomy. . COMPARISON: CT chest abdomen pelvis on 7/3/2024 and 10/2/2024 TECHNIQUE: CT examination of the abdomen and pelvis was performed. Multiplanar 2D reformatted images were created from the source data. This examination, like all CT scans performed in the [facility], was performed utilizing techniques to minimize radiation dose exposure, including the use of iterative reconstruction and automated exposure control. Radiation dose length product (DLP) for this visit: 339 mGy-cm IV Contrast: 85 mL of iohexol (OMNIPAQUE) 50 mL of iohexol (OMNIPAQUE) Enteric Contrast: Not administered. FINDINGS: ABDOMEN LOWER CHEST: Multiple pulmonary nodules in the imaged lung bases  measuring up to 8 mm are again seen, slightly increased in size and number compared to the prior exam. No pleural or pericardial effusions. LIVER/BILIARY TREE: Unremarkable. GALLBLADDER: No calcified gallstones. No pericholecystic inflammatory change. SPLEEN: Splenomegaly without focal lesions PANCREAS: Unremarkable. ADRENAL GLANDS: Splenomegaly without focal lesions. KIDNEYS/URETERS: Kidneys normal in size and position. Mild left and moderate right hydronephrosis. No perinephric fluid collections. Multiple lower pelvic phleboliths. Asymmetrical right perinephric hazy inflammatory stranding noted. Bilateral ureteral stents noted in place with distal pigtail loops within the posterior inferior bladder and proximal pigtail loops in the left upper pole collecting system and right renal pelvic regions. STOMACH AND BOWEL: Stomach is moderately distended with air and oral contrast/debris. The administered oral contrast has reached pelvic distal ileal loops. No findings of bowel obstruction or mesenteric inflammation noted. Small and large bowel loops are  normal in course and caliber without evidence for obstruction. Right ventral abdominal ileostomy. APPENDIX: No findings to suggest appendicitis. ABDOMINOPELVIC CAVITY: No ascites or loculated fluid collection. No pneumoperitoneum. No lymphadenopathy by size criteria. Nonspecific subcentimeter left retroperitoneal/para-aortic lymph nodes are again seen.. VESSELS: Atherosclerosis without abdominal aortic aneurysm. PELVIS REPRODUCTIVE ORGANS: Unremarkable for patient's age. URINARY BLADDER: No intraluminal mass/calculi or irregular wall thickening. Distal pigtail loops of the bilateral ureteral stents noted within the bladder lumen. ABDOMINAL WALL/INGUINAL REGIONS: Unremarkable. BONES: No acute fracture or suspicious osseous lesion.     Impression: 1.  Multiple pulmonary nodules in the imaged lung bases measuring up to 8 mm are again seen, slightly increased in size and  number compared to the prior exam. 2.  Splenomegaly without focal lesions. 3.  Mild left and moderate right hydronephrosis. Nonobstructing calculi in the lower pole of the right kidney. Bilateral ureteral stents noted in place with distal pigtail loops within the posterior inferior bladder and proximal pigtail loops in the left  upper pole collecting system and right renal pelvic regions.  Asymmetrical right perinephric hazy inflammatory stranding noted. Recommend correlation with urinalysis to exclude right  tract infection. 4.  Right ventral abdominal ileostomy. Dilated distal ileal loops prior to the ileostomy site containing heterogeneous density debris. Findings may indicate focal ileus or dysmotility. No evidence for bowel obstruction, mesenteric inflammation, appendicitis, free air, or free fluid. Workstation performed: [id number]     Procedure: FL retrograde pyelogram  Result Date: 10/18/2024  Narrative: C-ARM - FL RETROGRADE PYELOGRAM INDICATION: N20.1: Calculus of ureter. Procedure guidance. TECHNIQUE: Fluoroscopic guidance provided. COMPARISON: None FLUOROSCOPY TIME: 6 minutes 15 seconds 62 FLUOROSCOPIC IMAGES FINDINGS: Fluoroscopy provided for procedure guidance. Initial image demonstrates a double-J stent on the left with on the right. Follow-up contrast images demonstrate narrowing of the proximal ureter followed by balloon and stent placement. Left double-J stent was removed and contrast injection demonstrates irregularity with filling defect in the mid ureter with balloon followed by interval double-J stent placement. The collecting system in the left kidney demonstrates filling defects followed by lithotripsy Osseous and soft tissue detail limited by technique.     Impression: Fluoroscopy provided for procedure guidance. Please refer to the separate procedure note for additional details. Workstation performed: [id number]     Procedure: CT chest abdomen pelvis w contrast  Result Date:  10/3/2024  Narrative: CT CHEST, ABDOMEN AND PELVIS WITH IV CONTRAST INDICATION: C20: Malignant neoplasm of rectum R91.8: Other nonspecific abnormal finding of lung field. COMPARISON: CT chest and pelvis 6/24/2024 and CT abdomen pelvis 9/24/2024. TECHNIQUE: CT examination of the chest, abdomen and pelvis was performed. Multiplanar 2D reformatted images were created from the source data. This examination, like all CT scans performed in the [facility], was performed utilizing techniques to minimize radiation dose exposure, including the use of iterative reconstruction and automated exposure control. Radiation dose length product (DLP) for this visit: 527 mGy-cm IV Contrast: 100 mL of iohexol (OMNIPAQUE) Enteric Contrast: Not administered. FINDINGS: CHEST LUNGS: Numerous bilateral pulmonary nodules, many of which are cavitary, unchanged from previous examination. Stable 1.3 cm irregular nodular opacity in the lingula. See representative nodules in the key images. There are no new or enlarging nodules. No tracheal or endobronchial lesion. PLEURA: Unremarkable. HEART/GREAT VESSELS: Heart is unremarkable for patient's age. No thoracic aortic aneurysm. MEDIASTINUM AND DUSTY: No lymphadenopathy. CHEST WALL AND LOWER NECK: Stable 2.0 x 1.1 cm lymph node in the right neck (2/8). Partial imaged right thyroid nodule does not appear changed. Right-sided chest port. ABDOMEN LIVER/BILIARY TREE: Unremarkable. GALLBLADDER: Decompressed. No calcified gallstones. No pericholecystic inflammatory change. SPLEEN: No significant change in diffuse subtle low-density lesions throughout the spleen. PANCREAS: Unremarkable. ADRENAL GLANDS: Stable 9 mm right adrenal nodule. KIDNEYS/URETERS: Bilateral ureteral stents in stable position. Hydronephrosis has resolved. Bilateral nonobstructing nephrolithiasis. Subcentimeter hypoattenuating renal lesion(s), too small to characterize but statistically likely benign, which do not warrant  follow-up (Radiology June 2019). STOMACH AND BOWEL: No dilated loops of bowel. Colonic resection changes with ileostomy in the right upper quadrant.. APPENDIX: No findings to suggest appendicitis. ABDOMINOPELVIC CAVITY: No ascites. No pneumoperitoneum. No lymphadenopathy. VESSELS: Unremarkable for patient's age. PELVIS REPRODUCTIVE ORGANS: Unremarkable for patient's age. URINARY BLADDER: Unremarkable. ABDOMINAL WALL/INGUINAL REGIONS: Unremarkable. BONES: No acute fracture or suspicious osseous lesion.     Impression: Stable pulmonary nodules and splenic hypodensities. No new sites of disease. Bilateral ureteral stents with resolution of hydronephrosis. Workstation performed: [id number]       Administrative Statements   I have spent a total time of 31 minutes in caring for this patient on the day of the visit/encounter including Risks and benefits of tx options, Instructions for management, Patient and family education, Importance of tx compliance, Risk factor reductions, Impressions, Counseling / Coordination of care, Documenting in the medical record, Reviewing / ordering tests, medicine, procedures  , and Obtaining or reviewing history  . Topics discussed with the patient / family include symptom assessment and management, medication review, psychosocial support, supportive listening, and anticipatory guidance.

## 2025-01-29 NOTE — ASSESSMENT & PLAN NOTE
S/p ureteral stent placed on 1/17/2025 with removal of left ureteral stent.  Since procedure, patient has had significant improvement of her pain particularly to her flanks and back.  She has only needed to occasionally take oxycodone (1 tablet on select day with more discomfort).    Hopefully we can further wean and get to a point where we can discontinue opioids in the future.

## 2025-01-29 NOTE — ASSESSMENT & PLAN NOTE
BYRON status  -pending colostomy reversal with Colorectal surgery   -patient is scheduled to have a colonoscopy first for evaluation.    Still has discomfort around the ostomy site where she states this can be a burning pain. However, oxycodone has been helpful for this discomfort as well. She tries to use this sparingly as she has been using APAP at 500mg PRN. Discussed 3,000mg maximum in a 24 hour period with APAP and not to exceed.    Maintain OxyIR 5mg q8 hours PRN dosing for the interim given her intermittent stoma discomfort from time to time.  May consider neuropathic agents in the future given her complaint of a burning sensation.

## 2025-01-29 NOTE — ASSESSMENT & PLAN NOTE
Psychosocial   Supportive listening provided  Normalized experience of patient/family  Provided anxiety containment     Referrals Placed / Medical Equipment Ordered  -None    Follow-Up Recommendations  -Follow-up with PCP and current medical specialists  -Follow-up with palliative care: 6 weeks

## 2025-02-03 ENCOUNTER — ANESTHESIA EVENT (OUTPATIENT)
Dept: GASTROENTEROLOGY | Facility: HOSPITAL | Age: 60
End: 2025-02-03
Payer: COMMERCIAL

## 2025-02-03 ENCOUNTER — HOSPITAL ENCOUNTER (OUTPATIENT)
Dept: GASTROENTEROLOGY | Facility: HOSPITAL | Age: 60
Setting detail: OUTPATIENT SURGERY
Discharge: HOME | End: 2025-02-03
Attending: COLON & RECTAL SURGERY
Payer: COMMERCIAL

## 2025-02-03 ENCOUNTER — ANESTHESIA (OUTPATIENT)
Dept: GASTROENTEROLOGY | Facility: HOSPITAL | Age: 60
End: 2025-02-03
Payer: COMMERCIAL

## 2025-02-03 VITALS
HEIGHT: 62 IN | HEART RATE: 89 BPM | SYSTOLIC BLOOD PRESSURE: 132 MMHG | TEMPERATURE: 97.3 F | RESPIRATION RATE: 20 BRPM | BODY MASS INDEX: 19.95 KG/M2 | WEIGHT: 108.4 LBS | DIASTOLIC BLOOD PRESSURE: 74 MMHG | OXYGEN SATURATION: 99 %

## 2025-02-03 DIAGNOSIS — C20 RECTAL CANCER (HCC): ICD-10-CM

## 2025-02-03 PROCEDURE — G0105 COLORECTAL SCRN; HI RISK IND: HCPCS | Performed by: COLON & RECTAL SURGERY

## 2025-02-03 RX ORDER — PHENYLEPHRINE HCL IN 0.9% NACL 1 MG/10 ML
SYRINGE (ML) INTRAVENOUS AS NEEDED
Status: DISCONTINUED | OUTPATIENT
Start: 2025-02-03 | End: 2025-02-03

## 2025-02-03 RX ORDER — OXYCODONE HYDROCHLORIDE 5 MG/1
5 TABLET ORAL EVERY 4 HOURS PRN
Status: COMPLETED | OUTPATIENT
Start: 2025-02-03 | End: 2025-02-03

## 2025-02-03 RX ORDER — PROPOFOL 10 MG/ML
INJECTION, EMULSION INTRAVENOUS AS NEEDED
Status: DISCONTINUED | OUTPATIENT
Start: 2025-02-03 | End: 2025-02-03

## 2025-02-03 RX ORDER — SODIUM CHLORIDE, SODIUM LACTATE, POTASSIUM CHLORIDE, CALCIUM CHLORIDE 600; 310; 30; 20 MG/100ML; MG/100ML; MG/100ML; MG/100ML
INJECTION, SOLUTION INTRAVENOUS CONTINUOUS PRN
Status: DISCONTINUED | OUTPATIENT
Start: 2025-02-03 | End: 2025-02-03

## 2025-02-03 RX ORDER — PROPOFOL 10 MG/ML
INJECTION, EMULSION INTRAVENOUS CONTINUOUS PRN
Status: DISCONTINUED | OUTPATIENT
Start: 2025-02-03 | End: 2025-02-03

## 2025-02-03 RX ADMIN — OXYCODONE HYDROCHLORIDE 5 MG: 5 TABLET ORAL at 11:36

## 2025-02-03 RX ADMIN — PROPOFOL 120 MCG/KG/MIN: 10 INJECTION, EMULSION INTRAVENOUS at 11:03

## 2025-02-03 RX ADMIN — PROPOFOL 100 MG: 10 INJECTION, EMULSION INTRAVENOUS at 11:02

## 2025-02-03 RX ADMIN — PROPOFOL 25 MG: 10 INJECTION, EMULSION INTRAVENOUS at 11:11

## 2025-02-03 RX ADMIN — PROPOFOL 25 MG: 10 INJECTION, EMULSION INTRAVENOUS at 11:03

## 2025-02-03 RX ADMIN — SODIUM CHLORIDE, SODIUM LACTATE, POTASSIUM CHLORIDE, AND CALCIUM CHLORIDE: .6; .31; .03; .02 INJECTION, SOLUTION INTRAVENOUS at 10:06

## 2025-02-03 RX ADMIN — PROPOFOL 50 MG: 10 INJECTION, EMULSION INTRAVENOUS at 11:07

## 2025-02-03 RX ADMIN — Medication 100 MCG: at 11:05

## 2025-02-03 NOTE — INTERVAL H&P NOTE
Colonoscopy as discussed  H&P reviewed. After examining the patient I find no changes in the patients condition since the H&P had been written.    Vitals:    02/03/25 1010   BP: 98/54   Pulse: 96   Resp: 18   Temp: 97.9 °F (36.6 °C)   SpO2: 100%

## 2025-02-03 NOTE — ANESTHESIA PREPROCEDURE EVALUATION
Procedure:  COLONOSCOPY    Relevant Problems   CARDIO   (+) Chest pain   (+) Hemorrhoid   (+) Hemorrhoid prolapse   (+) Hypertension   (+) Nonrheumatic mitral valve regurgitation      GI/HEPATIC   (+) History of rectal cancer (HCC)   (+) Malignant neoplasm of sigmoid colon (HCC)   (+) Partial small bowel obstruction (HCC)      /RENAL   (+) THOR (acute kidney injury) (HCC)   (+) Benign hypertension with chronic kidney disease, stage III (HCC)   (+) Bilateral hydronephrosis   (+) CKD stage G3a/A2, GFR 45-59 and albumin creatinine ratio  mg/g (HCC)   (+) CKD stage G3a/A3, GFR 45-59 and albumin creatinine ratio >300 mg/g (HCC)   (+) Chronic kidney disease-mineral bone disorder (CKD-MBD) with stage 3a chronic kidney disease (HCC)   (+) Hydronephrosis due to ureteral stricture   (+) Nephrolithiasis      HEMATOLOGY   (+) Iron deficiency anemia   (+) Pancytopenia (HCC)      MUSCULOSKELETAL   (+) Back pain      NEURO/PSYCH   (+) Anxiety about health        Physical Exam    Airway    Mallampati score: II  TM Distance: >3 FB  Neck ROM: full     Dental       Cardiovascular      Pulmonary      Other Findings  post-pubertal.      Anesthesia Plan  ASA Score- 3     Anesthesia Type- IV sedation with anesthesia with ASA Monitors.         Additional Monitors:     Airway Plan:            Plan Factors-Exercise tolerance (METS): >4 METS.    Chart reviewed.    Patient summary reviewed.    Patient is not a current smoker.  Patient did not smoke on day of surgery.            Induction- intravenous.    Postoperative Plan-     Perioperative Resuscitation Plan - Level 1 - Full Code.       Informed Consent- Anesthetic plan and risks discussed with patient and spouse.  I personally reviewed this patient with the CRNA. Discussed and agreed on the Anesthesia Plan with the CRNA..      NPO Status:  Vitals Value Taken Time   Date of last liquid 02/03/25 02/03/25 1008   Time of last liquid 0100 02/03/25 1008   Date of last solid     Time of  last solid

## 2025-02-03 NOTE — PROGRESS NOTES
Name: Danielle Haque      : 1965      MRN: [id number]  Encounter Provider: Jennifer Canseco MD  Encounter Date: 2025   Encounter department: [facility] [address]  :  Assessment & Plan  Sarcoidosis  Pt with pulmonary and hepatic sarcoidosis presenting for follow-up for starting steroid-sparing agent since she had been having increasing hypercalcemia that was responsive to steroids in the past, but her nephrologist and GI doctor want to start steroid-sparing agent in lieu of planning colostomy reversal surgery in the near future.  She has been doing well since starting the methotrexate, although the past 2 days she did complain about having more fatigue than usual but this was not an issue in the past 2 months since starting the medication.  Her blood counts do seem on the lower side but no clear trend of lowering since starting the methotrexate, and they are stable.  Did discuss possibility of switching to injectable methotrexate for better GI absorption, however they want to continue with the oral option for now.  Plan:  Continue methotrexate 6 tablets/week with daily folic acid  We will check CBC monthly, other labs every 3 months  RTC 6 months  Orders:    Sedimentation rate, automated; Standing    C-reactive protein; Standing    CBC and differential; Standing    Comprehensive metabolic panel; Standing    High risk medication use    Orders:    Sedimentation rate, automated; Standing    C-reactive protein; Standing    CBC and differential; Standing    Comprehensive metabolic panel; Standing    Iron Panel (Includes Ferritin, Iron Sat%, Iron, and TIBC); Future        History of Present Illness   HPI  Danielle Haque is a 59 y.o. female who presents for evaluation of sarcoidosis.     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   -Patient was referred to Rheumatology in July 2023 for evaluation of alternative organ involvement of her sarcoidosis.   -Patient was hospitalized in September for labs showing hypercalcemia with Ca level above 11, at which time Rheumatology had been consulted and determined she was probably in a Sarcoid flare and had been prescribed a steroid taper.ACE level was 122 (increased from 117 in August, 191 in May). Vitamin D 1,25 was within normal limits. Her calcium level last checked at the end of September had normalized to 9. She had completed the steroid taper that was prescribed in hospital.    -Patient had continued to have high Calcium levels, and since chronic steroid therapy was not a good long-term solution, Nephrology and GI had a multi-disciplinary conversation with us about starting patient on a steroid-sparing agent such as methotrexate.  -She was started on methotrexate 6 tablets weekly     Pt was unfortunately taking the wrong dose of MTX initially, and only taking 1 tablet per week. She was advised of this mistake and had corrected the dose since then.      Last few calcium levels have been normal.     Labs from January showed elevated CRP 31.1 and ESR 57, stable CMP and CBC.    This patient has been doing well for the most part, however the past 2 days she complained of having more fatigue and decreased appetite.  She mentioned she used to take iron infusions in the past and is not sure if that something that has been contributing to her symptoms.  She underwent a colonoscopy yesterday which showed healthy colorectal anastomosis.    No further complaints at this time.        Review of Systems   Constitutional:  Positive for appetite change and fatigue.   HENT: Negative.     Eyes: Negative.    Respiratory: Negative.     Cardiovascular: Negative.    Gastrointestinal:  Positive for nausea.   Genitourinary: Negative.    Musculoskeletal: Negative.    Skin: Negative.           Objective  "  /80 (BP Location: Right arm, Patient Position: Sitting, Cuff Size: Adult)   Temp 98 °F (36.7 °C) (Tympanic)   Ht 5' 2\" (1.575 m)   Wt 49.4 kg (109 lb)   BMI 19.94 kg/m²      Physical Exam  Constitutional:       Appearance: Normal appearance.   HENT:      Head: Normocephalic.   Eyes:      Extraocular Movements: Extraocular movements intact.      Pupils: Pupils are equal, round, and reactive to light.   Cardiovascular:      Pulses: Normal pulses.      Heart sounds: Normal heart sounds.   Pulmonary:      Effort: Pulmonary effort is normal.      Breath sounds: Normal breath sounds.   Musculoskeletal:      Comments: MSK Exam  The following areas have been examined today and do not show any signs of synovitis, tenderness, or restricted ROM unless otherwise specified below:  Neck  Shoulders  Back  Elbows  Wrists  Hands  Hips  Knees  Ankles  Feet    Neurological:      Mental Status: She is oriented to person, place, and time.           "

## 2025-02-03 NOTE — ANESTHESIA POSTPROCEDURE EVALUATION
Post-Op Assessment Note    CV Status:  Stable    Pain management: adequate       Mental Status:  Awake and sleepy   Hydration Status:  Euvolemic   PONV Controlled:  Controlled   Airway Patency:  Patent     Post Op Vitals Reviewed: Yes    No anethesia notable event occurred.    Staff: Anesthesiologist, CRNA           Last Filed PACU Vitals:  Vitals Value Taken Time   Temp 97.3 °F (36.3 °C) 02/03/25 1120   Pulse 103 02/03/25 1120   /86 02/03/25 1120   Resp 20 02/03/25 1120   SpO2 100 % 02/03/25 1120       Modified Say:     Vitals Value Taken Time   Activity 2 02/03/25 1122   Respiration 2 02/03/25 1122   Circulation 1 02/03/25 1122   Consciousness 1 02/03/25 1122   Oxygen Saturation 2 02/03/25 1122     Modified Say Score: 8

## 2025-02-03 NOTE — PERIOPERATIVE NURSING NOTE
"RN noticed pt chewing during pre op questions. Pt discovered eating a mint. GI & Anesthesia Teams notified via Secure Chat.   Last pt in [facility] came in eating a peppermint. Noticed during pre op questions. Pt stating \"This is a liquid\". Informed pt her procedure may be delayed.   "

## 2025-02-03 NOTE — PERIOPERATIVE NURSING NOTE
Pt medicated for pain per orders. Pt &  notified that pain to be expected per Erna. Pt given snack, tolerating uneventfully.. Pt getting dressed.    I will SWITCH the dose or number of times a day I take the medications listed below when I get home from the hospital:    Eliquis 5 mg oral tablet  -- 1 tab(s) by mouth 2 times a day

## 2025-02-04 ENCOUNTER — OFFICE VISIT (OUTPATIENT)
Age: 60
End: 2025-02-04

## 2025-02-04 VITALS
TEMPERATURE: 98 F | BODY MASS INDEX: 20.06 KG/M2 | HEIGHT: 62 IN | SYSTOLIC BLOOD PRESSURE: 110 MMHG | WEIGHT: 109 LBS | DIASTOLIC BLOOD PRESSURE: 80 MMHG

## 2025-02-04 DIAGNOSIS — D86.9 SARCOIDOSIS: Primary | ICD-10-CM

## 2025-02-04 DIAGNOSIS — Z79.899 HIGH RISK MEDICATION USE: ICD-10-CM

## 2025-02-04 NOTE — ASSESSMENT & PLAN NOTE
Pt with pulmonary and hepatic sarcoidosis presenting for follow-up for starting steroid-sparing agent since she had been having increasing hypercalcemia that was responsive to steroids in the past, but her nephrologist and GI doctor want to start steroid-sparing agent in lieu of planning colostomy reversal surgery in the near future.  She has been doing well since starting the methotrexate, although the past 2 days she did complain about having more fatigue than usual but this was not an issue in the past 2 months since starting the medication.  Her blood counts do seem on the lower side but no clear trend of lowering since starting the methotrexate, and they are stable.  Did discuss possibility of switching to injectable methotrexate for better GI absorption, however they want to continue with the oral option for now.  Plan:  Continue methotrexate 6 tablets/week with daily folic acid  We will check CBC monthly, other labs every 3 months  RTC 6 months  Orders:    Sedimentation rate, automated; Standing    C-reactive protein; Standing    CBC and differential; Standing    Comprehensive metabolic panel; Standing

## 2025-02-05 ENCOUNTER — APPOINTMENT (OUTPATIENT)
Dept: LAB | Facility: AMBULARY SURGERY CENTER | Age: 60
End: 2025-02-05
Payer: COMMERCIAL

## 2025-02-05 DIAGNOSIS — D61.818 PANCYTOPENIA (HCC): ICD-10-CM

## 2025-02-05 DIAGNOSIS — Z79.899 HIGH RISK MEDICATION USE: ICD-10-CM

## 2025-02-05 DIAGNOSIS — N18.31 CKD STAGE G3A/A3, GFR 45-59 AND ALBUMIN CREATININE RATIO >300 MG/G (HCC): ICD-10-CM

## 2025-02-05 DIAGNOSIS — D86.9 SARCOIDOSIS: ICD-10-CM

## 2025-02-05 DIAGNOSIS — E87.5 HYPERKALEMIA: ICD-10-CM

## 2025-02-05 LAB
ALBUMIN SERPL BCG-MCNC: 4.2 G/DL (ref 3.5–5)
ALP SERPL-CCNC: 139 U/L (ref 34–104)
ALT SERPL W P-5'-P-CCNC: 48 U/L (ref 7–52)
ANION GAP SERPL CALCULATED.3IONS-SCNC: 11 MMOL/L (ref 4–13)
AST SERPL W P-5'-P-CCNC: 44 U/L (ref 13–39)
BACTERIA UR QL AUTO: ABNORMAL /HPF
BASOPHILS # BLD AUTO: 0.03 THOUSANDS/ΜL (ref 0–0.1)
BASOPHILS NFR BLD AUTO: 1 % (ref 0–1)
BILIRUB SERPL-MCNC: 0.47 MG/DL (ref 0.2–1)
BILIRUB UR QL STRIP: NEGATIVE
BUN SERPL-MCNC: 68 MG/DL (ref 5–25)
CALCIUM SERPL-MCNC: 10 MG/DL (ref 8.4–10.2)
CHLORIDE SERPL-SCNC: 108 MMOL/L (ref 96–108)
CLARITY UR: ABNORMAL
CO2 SERPL-SCNC: 20 MMOL/L (ref 21–32)
COLOR UR: YELLOW
CREAT SERPL-MCNC: 2.25 MG/DL (ref 0.6–1.3)
CREAT UR-MCNC: 102.6 MG/DL
CRP SERPL QL: 56.9 MG/L
EOSINOPHIL # BLD AUTO: 0.23 THOUSAND/ΜL (ref 0–0.61)
EOSINOPHIL NFR BLD AUTO: 5 % (ref 0–6)
ERYTHROCYTE [DISTWIDTH] IN BLOOD BY AUTOMATED COUNT: 15.6 % (ref 11.6–15.1)
ERYTHROCYTE [SEDIMENTATION RATE] IN BLOOD: 45 MM/HOUR (ref 0–29)
FERRITIN SERPL-MCNC: 648 NG/ML (ref 11–307)
GFR SERPL CREATININE-BSD FRML MDRD: 23 ML/MIN/1.73SQ M
GLUCOSE P FAST SERPL-MCNC: 96 MG/DL (ref 65–99)
GLUCOSE UR STRIP-MCNC: NEGATIVE MG/DL
HCT VFR BLD AUTO: 24.6 % (ref 34.8–46.1)
HGB BLD-MCNC: 8.3 G/DL (ref 11.5–15.4)
HGB UR QL STRIP.AUTO: ABNORMAL
IMM GRANULOCYTES # BLD AUTO: 0.02 THOUSAND/UL (ref 0–0.2)
IMM GRANULOCYTES NFR BLD AUTO: 0 % (ref 0–2)
IRON SATN MFR SERPL: 29 % (ref 15–50)
IRON SERPL-MCNC: 106 UG/DL (ref 50–212)
KETONES UR STRIP-MCNC: NEGATIVE MG/DL
LEUKOCYTE ESTERASE UR QL STRIP: ABNORMAL
LYMPHOCYTES # BLD AUTO: 0.35 THOUSANDS/ΜL (ref 0.6–4.47)
LYMPHOCYTES NFR BLD AUTO: 8 % (ref 14–44)
MCH RBC QN AUTO: 32.3 PG (ref 26.8–34.3)
MCHC RBC AUTO-ENTMCNC: 33.7 G/DL (ref 31.4–37.4)
MCV RBC AUTO: 96 FL (ref 82–98)
MICROALBUMIN UR-MCNC: 65.7 MG/L
MICROALBUMIN/CREAT 24H UR: 64 MG/G CREATININE (ref 0–30)
MONOCYTES # BLD AUTO: 0.28 THOUSAND/ΜL (ref 0.17–1.22)
MONOCYTES NFR BLD AUTO: 6 % (ref 4–12)
MUCOUS THREADS UR QL AUTO: ABNORMAL
NEUTROPHILS # BLD AUTO: 3.75 THOUSANDS/ΜL (ref 1.85–7.62)
NEUTS SEG NFR BLD AUTO: 80 % (ref 43–75)
NITRITE UR QL STRIP: NEGATIVE
NON-SQ EPI CELLS URNS QL MICRO: ABNORMAL /HPF
NRBC BLD AUTO-RTO: 0 /100 WBCS
PH UR STRIP.AUTO: 6 [PH]
PLATELET # BLD AUTO: 279 THOUSANDS/UL (ref 149–390)
PMV BLD AUTO: 10.4 FL (ref 8.9–12.7)
POTASSIUM SERPL-SCNC: 4.2 MMOL/L (ref 3.5–5.3)
PROT SERPL-MCNC: 7.1 G/DL (ref 6.4–8.4)
PROT UR STRIP-MCNC: ABNORMAL MG/DL
RBC # BLD AUTO: 2.57 MILLION/UL (ref 3.81–5.12)
RBC #/AREA URNS AUTO: ABNORMAL /HPF
SODIUM SERPL-SCNC: 139 MMOL/L (ref 135–147)
SP GR UR STRIP.AUTO: 1.01 (ref 1–1.03)
TIBC SERPL-MCNC: 359.8 UG/DL (ref 250–450)
TRANSFERRIN SERPL-MCNC: 257 MG/DL (ref 203–362)
UIBC SERPL-MCNC: 254 UG/DL (ref 155–355)
UROBILINOGEN UR STRIP-ACNC: <2 MG/DL
WBC # BLD AUTO: 4.66 THOUSAND/UL (ref 4.31–10.16)
WBC #/AREA URNS AUTO: ABNORMAL /HPF

## 2025-02-05 PROCEDURE — 85025 COMPLETE CBC W/AUTO DIFF WBC: CPT

## 2025-02-05 PROCEDURE — 80053 COMPREHEN METABOLIC PANEL: CPT

## 2025-02-05 PROCEDURE — 81001 URINALYSIS AUTO W/SCOPE: CPT

## 2025-02-05 PROCEDURE — 86140 C-REACTIVE PROTEIN: CPT

## 2025-02-05 PROCEDURE — 82043 UR ALBUMIN QUANTITATIVE: CPT

## 2025-02-05 PROCEDURE — 36415 COLL VENOUS BLD VENIPUNCTURE: CPT

## 2025-02-05 PROCEDURE — 85652 RBC SED RATE AUTOMATED: CPT

## 2025-02-05 PROCEDURE — 82728 ASSAY OF FERRITIN: CPT

## 2025-02-05 PROCEDURE — 83550 IRON BINDING TEST: CPT

## 2025-02-05 PROCEDURE — 83540 ASSAY OF IRON: CPT

## 2025-02-05 PROCEDURE — 82570 ASSAY OF URINE CREATININE: CPT

## 2025-02-06 DIAGNOSIS — D50.9 IRON DEFICIENCY ANEMIA, UNSPECIFIED IRON DEFICIENCY ANEMIA TYPE: ICD-10-CM

## 2025-02-06 RX ORDER — FERROUS SULFATE 324(65)MG
324 TABLET, DELAYED RELEASE (ENTERIC COATED) ORAL
Qty: 30 TABLET | Refills: 5 | Status: SHIPPED | OUTPATIENT
Start: 2025-02-06

## 2025-02-07 ENCOUNTER — TELEPHONE (OUTPATIENT)
Age: 60
End: 2025-02-07

## 2025-02-07 DIAGNOSIS — N18.31 CKD STAGE G3A/A3, GFR 45-59 AND ALBUMIN CREATININE RATIO >300 MG/G (HCC): ICD-10-CM

## 2025-02-07 DIAGNOSIS — I12.9 BENIGN HYPERTENSION WITH CHRONIC KIDNEY DISEASE, STAGE III (HCC): ICD-10-CM

## 2025-02-07 DIAGNOSIS — D50.9 IRON DEFICIENCY ANEMIA, UNSPECIFIED IRON DEFICIENCY ANEMIA TYPE: Primary | ICD-10-CM

## 2025-02-07 DIAGNOSIS — N18.30 BENIGN HYPERTENSION WITH CHRONIC KIDNEY DISEASE, STAGE III (HCC): ICD-10-CM

## 2025-02-07 NOTE — TELEPHONE ENCOUNTER
Pt spouse called in concerned about her recent blood work results and is asking if PCP can review and advise.

## 2025-02-07 NOTE — TELEPHONE ENCOUNTER
Mateo calling in for patient, said that she had labs done 2 days ago and he did see the results he is concerned with her kidney numbers.  He said that patient has been more lethargic for the last 3 or 4 days, not eating and drinking as well because she has been sleeping a lot.  He said that she was recently started on methotrexate and not sure if this has anything to do with it.  He is going to call rheum now as well. Please advise and call Mateo.

## 2025-02-07 NOTE — TELEPHONE ENCOUNTER
Spoke with pt  and relayed Dr. Canseco's message. Aware that Dr. Canseco doesn't think the elevated Creatinine is from the methotrexate and that should follow up with PCP and nephrology. He states they are waiting to hear back from nephrology.

## 2025-02-07 NOTE — TELEPHONE ENCOUNTER
Pt spouse Mateo calling in to follow up on lab results. Waiting to hear back. Did already reach out to nephrologist as well about results but concerned.       Please review and let Mateo be aware.

## 2025-02-09 DIAGNOSIS — D50.9 IRON DEFICIENCY ANEMIA, UNSPECIFIED IRON DEFICIENCY ANEMIA TYPE: ICD-10-CM

## 2025-02-10 NOTE — TELEPHONE ENCOUNTER
I reviewed lab and noticed creatinine is more elevated.   There was a phone note from her family last week stating pt was more lethargic, not eating and drinking much. If that's the case, pt should go to ER to likely get some IV hydration.   I see staff send a message to her nephrologist Dr. Judge. I will also forward this msg to her.

## 2025-02-11 NOTE — TELEPHONE ENCOUNTER
Spoke with patient's  Mateo is aware and stated that patient now is already eating and drinking. Mateo would like Dr. Reyes to add labs so they can go to the laboratory to do and Dr. Reyes review it. If labs are no okay then they will go to ER.

## 2025-02-11 NOTE — TELEPHONE ENCOUNTER
Her nephrologist Dr. Reyes is following this. I see she ordered repeat lab.  I agree if pt is more awake now and able to eat and drink, we could recheck labs. I will leave it to her nephrologist Dr. Reyes to decided if outpatient IV hydration is indicated.

## 2025-02-11 NOTE — TELEPHONE ENCOUNTER
Spoke to the patients  Rudy and advised. He states that in the interim, Rheumatology has called and it was decided that since the patient is more alert and oriented, they are going to do blood work Thursday and see where her levels are. If they remain low, then they will take her to get the IV hydration. He did also ask if it was possible to have the IV hydration done through her port. I advised that is possible but that Dr. Anaya would need to put in orders for the infusion center to administer the fluids. We will await a call back from the patients  on whether to proceed with the hydration once the labs are done.

## 2025-02-13 ENCOUNTER — APPOINTMENT (OUTPATIENT)
Dept: LAB | Facility: AMBULARY SURGERY CENTER | Age: 60
End: 2025-02-13
Payer: COMMERCIAL

## 2025-02-13 ENCOUNTER — RESULTS FOLLOW-UP (OUTPATIENT)
Dept: GASTROENTEROLOGY | Facility: CLINIC | Age: 60
End: 2025-02-13

## 2025-02-13 DIAGNOSIS — Z79.899 HIGH RISK MEDICATION USE: ICD-10-CM

## 2025-02-13 DIAGNOSIS — N18.31 CKD STAGE G3A/A3, GFR 45-59 AND ALBUMIN CREATININE RATIO >300 MG/G (HCC): ICD-10-CM

## 2025-02-13 DIAGNOSIS — I12.9 BENIGN HYPERTENSION WITH CHRONIC KIDNEY DISEASE, STAGE III (HCC): ICD-10-CM

## 2025-02-13 DIAGNOSIS — D50.9 IRON DEFICIENCY ANEMIA, UNSPECIFIED IRON DEFICIENCY ANEMIA TYPE: ICD-10-CM

## 2025-02-13 DIAGNOSIS — N18.30 BENIGN HYPERTENSION WITH CHRONIC KIDNEY DISEASE, STAGE III (HCC): ICD-10-CM

## 2025-02-13 DIAGNOSIS — D86.9 SARCOIDOSIS: ICD-10-CM

## 2025-02-13 LAB
ANION GAP SERPL CALCULATED.3IONS-SCNC: 8 MMOL/L (ref 4–13)
BUN SERPL-MCNC: 43 MG/DL (ref 5–25)
CALCIUM SERPL-MCNC: 10.7 MG/DL (ref 8.4–10.2)
CHLORIDE SERPL-SCNC: 109 MMOL/L (ref 96–108)
CO2 SERPL-SCNC: 19 MMOL/L (ref 21–32)
CREAT SERPL-MCNC: 1.42 MG/DL (ref 0.6–1.3)
GFR SERPL CREATININE-BSD FRML MDRD: 41 ML/MIN/1.73SQ M
GLUCOSE P FAST SERPL-MCNC: 107 MG/DL (ref 65–99)
POTASSIUM SERPL-SCNC: 4.3 MMOL/L (ref 3.5–5.3)
SODIUM SERPL-SCNC: 136 MMOL/L (ref 135–147)

## 2025-02-13 PROCEDURE — 80048 BASIC METABOLIC PNL TOTAL CA: CPT

## 2025-02-13 PROCEDURE — 36415 COLL VENOUS BLD VENIPUNCTURE: CPT

## 2025-02-18 ENCOUNTER — PATIENT MESSAGE (OUTPATIENT)
Age: 60
End: 2025-02-18

## 2025-02-19 ENCOUNTER — NURSE TRIAGE (OUTPATIENT)
Age: 60
End: 2025-02-19

## 2025-02-19 RX ORDER — FERROUS SULFATE 324(65)MG
324 TABLET, DELAYED RELEASE (ENTERIC COATED) ORAL
Qty: 30 TABLET | Refills: 5 | Status: SHIPPED | OUTPATIENT
Start: 2025-02-19

## 2025-02-19 NOTE — TELEPHONE ENCOUNTER
REASON FOR CONVERSATION: Patient's  sent MyChart message regarding irritation and skin breakdown around her ileostomy.  Attempted to call patient and her  however the ne number in the chart is incorrect.

## 2025-02-19 NOTE — TELEPHONE ENCOUNTER
Called patient and her  again. I was able to reach them this time.  Patient has sent photos via [employer] of the area around the stoma that is causing her some burning pain.  I did ask if she was cutting the opening to her ostomy appliance to big, as that would cause stool to seep around the stoma and cause skin irritation or skin breakdown.  Patient states that she has not made the opening to big.  She does occasionally have some bright red blood noted in the ostomy bag.  In the photo you can see a small spot that looks that it may bleed from time to time.   I also mentioned to her that she can shower first thing in the morning without the ostomy bag on the next time she is scheduled to change her ostomy bag to help fresh the skin around the stoma.      Please review the photos and advise if you would like to refer patient to the ostomy clinic for evaluation/education.

## 2025-02-24 DIAGNOSIS — C18.7 MALIGNANT NEOPLASM OF SIGMOID COLON (HCC): Primary | ICD-10-CM

## 2025-03-04 ENCOUNTER — OFFICE VISIT (OUTPATIENT)
Dept: PALLIATIVE MEDICINE | Facility: CLINIC | Age: 60
End: 2025-03-04
Payer: COMMERCIAL

## 2025-03-04 VITALS
BODY MASS INDEX: 19.96 KG/M2 | DIASTOLIC BLOOD PRESSURE: 60 MMHG | SYSTOLIC BLOOD PRESSURE: 90 MMHG | HEIGHT: 62 IN | TEMPERATURE: 97.2 F | WEIGHT: 108.5 LBS

## 2025-03-04 DIAGNOSIS — Z51.5 PALLIATIVE CARE PATIENT: Primary | ICD-10-CM

## 2025-03-04 DIAGNOSIS — I10 PRIMARY HYPERTENSION: ICD-10-CM

## 2025-03-04 DIAGNOSIS — G89.3 CHRONIC PAIN AFTER CANCER TREATMENT: ICD-10-CM

## 2025-03-04 DIAGNOSIS — C18.7 MALIGNANT NEOPLASM OF SIGMOID COLON (HCC): ICD-10-CM

## 2025-03-04 DIAGNOSIS — G89.29 CHRONIC BILATERAL THORACIC BACK PAIN: ICD-10-CM

## 2025-03-04 DIAGNOSIS — M54.6 CHRONIC BILATERAL THORACIC BACK PAIN: ICD-10-CM

## 2025-03-04 PROCEDURE — 99213 OFFICE O/P EST LOW 20 MIN: CPT | Performed by: STUDENT IN AN ORGANIZED HEALTH CARE EDUCATION/TRAINING PROGRAM

## 2025-03-04 NOTE — ASSESSMENT & PLAN NOTE
Following Dr. Pandya of Medical Oncology - next follow-up in April 2025 with plans for repeat CT.  Schedule for appointments reviewed with patient and ex- today.    Patient also following Dr. Umanzor of Colorectal Surgery for follow-up/management of colostomy.    Patient states she had an issue about 2 weeks back with her colostomy causing discomfort, however, this has since resolved and voices no new issues at this moment.  Her last dose of Oxycodone was about 2 weeks ago when she was having pain to his area.  Has not needed to take the Oxy since.   She does use Acetaminophen as needed for her back pain/aches as well as positional changes with lying down for her chronic back pain.    No issues with appetite, nausea or vomiting at this time.

## 2025-03-04 NOTE — PROGRESS NOTES
Name: Danielle Haque      : 1965      MRN: [id number]  Encounter Provider: Jhon Banks DO  Encounter Date: 3/4/2025   Encounter department: [facility], [place]  :  Assessment & Plan  Malignant neoplasm of sigmoid colon (HCC)  Following Dr. Pandya of Medical Oncology - next follow-up in 2025 with plans for repeat CT.  Schedule for appointments reviewed with patient and ex- today.    Patient also following Dr. Umanzor of Colorectal Surgery for follow-up/management of colostomy.    Patient states she had an issue about 2 weeks back with her colostomy causing discomfort, however, this has since resolved and voices no new issues at this moment.  Her last dose of Oxycodone was about 2 weeks ago when she was having pain to his area.  Has not needed to take the Oxy since.   She does use Acetaminophen as needed for her back pain/aches as well as positional changes with lying down for her chronic back pain.    No issues with appetite, nausea or vomiting at this time.       Chronic pain after cancer treatment  Has chronic back pain (has been an issue as she states even before her cancer diagnosis years ago).  -discussed offer of Spine and Pain specialist referral which she declines at this time.         Palliative care patient  Psychosocial   Supportive listening provided  Normalized experience of patient/family  Provided anxiety containment     Referrals Placed / Medical Equipment Ordered  -None    Follow-Up Recommendations  -Follow-up with PCP and current medical specialists  -Follow-up with palliative care: 3 months          Chronic bilateral thoracic back pain  Ongoing for many years  -states typically aggravated by activities such as standing for too long or washing the dishes and having to stand for an extended period of time.  Takes tylenol which helps along with lying down to rest her back.    No chest pain, shortness of breath on exam.       Primary hypertension  Initial blood  pressure today in office of 80/46 (unsure of how accurate this was therefore, this provider completed a manual repeat of BP to the left upper extremity with cuff directly over skin and not over clothing).  -repeat BP was noted at 90/60.    Patient has not taken her blood pressure readings at home.  She has not taken her blood pressure medication for today (Lisinopril 5 mg).  -advised to hold on her blood pressure medication today (Lisinopril) and to reach out to her PCP for follow-up accordingly.  -patient and ex- aware of recommendations.           PDMP Review: I have reviewed the patient's controlled substance dispensing history in the Prescription Drug Monitoring Program in compliance with the [facility] regulations before prescribing any controlled substances.    History of Present Illness   Danielle Haque is a 59 y.o. female who presents for follow-up.    Palliative Diagnosis - Malignant neoplasm of sigmoid colon      Medical History Reviewed by provider this encounter:  Tobacco  Allergies  Meds  Problems  Med Hx  Surg Hx  Fam Hx     .  Past Medical History   Past Medical History:   Diagnosis Date    Bleeding from colostomy stoma (HCC) 02/11/2023     states ileostomy , not colostomy    Cancer (HCC)     Colon cancer (HCC)     Elevated serum creatinine 09/11/2024    Fall     Headache     Hemochromatosis, unspecified     History of transfusion     2022 - no adverse reaction    Hyperlipidemia     Hypertension     Hypokalemia 09/12/2024    Kidney calculi     Kidney stone     Liver disease     hemangioma    Muscle weakness     Personal history of COVID-19 12/2022    Sarcoidosis     Seizures (HCC)     2022    Shingles      Past Surgical History:   Procedure Laterality Date    ABSCESS DRAINAGE      left breast    BREAST BIOPSY      CHEST TUBE INSERTION      COLON SURGERY      colostomy    COLONOSCOPY      FL GUIDED CENTRAL VENOUS ACCESS DEVICE INSERTION  03/21/2023    FL RETROGRADE PYELOGRAM  01/23/2023     FL RETROGRADE PYELOGRAM  04/03/2023    FL RETROGRADE PYELOGRAM  7/21/2023    FL RETROGRADE PYELOGRAM  10/11/2023    FL RETROGRADE PYELOGRAM  12/15/2023    FL RETROGRADE PYELOGRAM  2/8/2024    FL RETROGRADE PYELOGRAM  5/10/2024    FL RETROGRADE PYELOGRAM  9/26/2024    FL RETROGRADE PYELOGRAM  10/18/2024    FL RETROGRADE PYELOGRAM  1/17/2025    IR BIOPSY LIVER MASS  05/09/2023    LUNG BIOPSY      TN CYSTO W/INSERT URETERAL STENT Bilateral 5/10/2024    Procedure: EXCHANGE STENT URETERAL;  Surgeon: José Luis Stephens MD;  [facility];  Service: Urology    TN CYSTO W/INSERT URETERAL STENT Bilateral 10/18/2024    Procedure: EXCHANGE STENT URETERAL;  Surgeon: José Luis Stephens MD;  [facility];  Service: Urology    TN CYSTO/URETERO W/LITHOTRIPSY &INDWELL STENT INSRT Bilateral 01/23/2023    Procedure: CYSTOSCOPY  RIGHT URETEROSCOPY WITH LITHOTRIPSY HOLMIUM LASER, BILATERAL RETROGRADE PYELOGRAM AND BILATERAL  URETERAL STENT INSERTION;  Surgeon: José Luis Stephens MD;  [facility];  Service: Urology    TN CYSTO/URETERO W/LITHOTRIPSY &INDWELL STENT INSRT Right 04/03/2023    Procedure: RIGHT URETEROSCOPY WITH LITHOTRIPSY HOLMIUM LASER, RETROGRADE PYELOGRAM; BILATERAL EXCHANGE STENT URETERAL;  Surgeon: José Luis Stephens MD;  [facility];  Service: Urology    TN CYSTO/URETERO W/LITHOTRIPSY &INDWELL STENT INSRT Bilateral 10/11/2023    Procedure: CYSTOSCOPY URETEROSCOPY RETROGRADE PYELOGRAM AND INSERTION STENT URETERAL DIAGNOSTINC RIGHT URETEROSCOPY, REMOVAL STENT LEFT SIDE;  Surgeon: José Luis Stephens MD;  [facility];  Service: Urology    TN CYSTO/URETERO W/LITHOTRIPSY &INDWELL STENT INSRT Bilateral 9/26/2024    Procedure: CYSTOSCOPY ,LEFT  URETEROSCOPY,  BILATERAL  RETROGRADE PYELOGRAM AND BILATERAL URETERAL STENT EXCHANGE;  Surgeon: Favian Barber MD;  [facility];  Service: Urology    TN CYSTO/URETERO W/LITHOTRIPSY &INDWELL STENT INSRT Left  10/18/2024    Procedure: CYSTOSCOPY LEFT URETEROSCOPY, treatment of LEFT ureteral stricture, RETROGRADE PYELOGRAM AND BILATERAL STENT EXCHANGE;  Surgeon: José Luis Stephens MD;  [facility];  Service: Urology    CA CYSTOURETHROSCOPY W/URETERAL CATHETERIZATION Bilateral 07/21/2023    Procedure: CYSTOSCOPY URETEROSCOPY RETROGRADE PYELOGRAM WITH BILATERAL STENT URETERAL EXCHANGE; LEFT LASER LITHOTRIPSY AND STONE BASKET RETRIEVAL;  Surgeon: José Luis Stephens MD;  [facility];  Service: Urology    CA CYSTOURETHROSCOPY W/URETERAL CATHETERIZATION Bilateral 12/15/2023    Procedure: CYSTOSCOPY, BILATERAL  RETROGRADE PYELOGRAM , STENT EXCHANGE RIGHT , LEFT URETEROSCOPY ,  HOLMIUM LASER WITH INSERTION LEFT URETERAL STENT;  Surgeon: Derick Bhakta MD;  [facility];  Service: Urology    CA CYSTOURETHROSCOPY W/URETERAL CATHETERIZATION Left 2/8/2024    Procedure: CYSTOSCOPY B/L RETROGRADE PYELOGRAM WITH B/L T URETERALSTENT EXCHANGE,LEFT URETEROSCOPY, DILATION LEFT URETERAL STICTURE;  Surgeon: José Luis Stephens MD;  [facility];  Service: Urology    CA CYSTOURETHROSCOPY W/URETERAL CATHETERIZATION Bilateral 5/10/2024    Procedure: CYSTOSCOPY RETROGRADE PYELOGRAM WITH INSERTION STENT URETERAL;  Surgeon: José Luis Stephens MD;  [facility];  Service: Urology    CA CYSTOURETHROSCOPY W/URETERAL CATHETERIZATION Right 1/17/2025    Procedure: CYSTOSCOPY RETROGRADE PYELOGRAM WITH INSERTION STENT URETERAL, LEFT RETROGRADE PYELOGRAM AND STENT REMOVAL;  Surgeon: José Luis Stephens MD;  [facility];  Service: Urology    CA INSJ TUNNELED CTR VAD W/SUBQ PORT AGE 5 YR/> N/A 03/21/2023    Procedure: INSERTION VENOUS PORT ( PORT-A-CATH) IR;  Surgeon: Mark Amos DO;  [facility];  Service: Interventional Radiology    CA LAPS COLECTOMY PRTL W/COLOPXTSTMY LW ANAST N/A 8/17/2023    Procedure: RESECTION COLON LOW ANTERIOR LAPAROSCOPIC WITH ROBOTICS;   Surgeon: Sree Umanzor MD;  [facility];  Service: Colorectal    TONSILLECTOMY      URINARY SURGERY Bilateral     bilateral stents     Family History   Problem Relation Age of Onset    Cancer Mother     Cirrhosis Father     Diabetes Father     Breast cancer Neg Hx     Breast cancer additional onset Neg Hx       reports that she has never smoked. She has been exposed to tobacco smoke. She has never used smokeless tobacco. She reports that she does not drink alcohol and does not use drugs.  Current Outpatient Medications   Medication Instructions    acetaminophen (TYLENOL) 650 mg, Oral, Every 4 hours PRN    Acetaminophen Extra Strength 500 MG TABS TAKE 1 TABLET (500 MG TOTAL) BY MOUTH EVERY 6 (SIX) HOURS AS NEEDED FOR MILD PAIN    clotrimazole (GYNE-LOTRIMIN) 1 % vaginal cream 1 applicator, Vaginal, Daily at bedtime    cyanocobalamin (VITAMIN B-12) 1,000 mcg, Daily    docusate sodium (COLACE) 100 mg, Oral, 2 times daily    fenofibrate (TRICOR) 145 mg, Oral, Daily    ferrous sulfate 324 mg, Oral, Daily before breakfast    folic acid (FOLVITE) 1 mg, Oral, Daily    hydrocortisone (ANUSOL-HC) 2.5 % rectal cream Topical, 2 times daily    lidocaine (XYLOCAINE) 5 % ointment Topical, As needed    lisinopril (ZESTRIL) 5 mg, Oral, Daily    methotrexate 15 mg, Oral, Weekly    naproxen (NAPROSYN) 500 mg, Oral, 2 times daily with meals, For pain    ondansetron (ZOFRAN) 4 mg, Oral, Every 8 hours PRN    oxybutynin (DITROPAN) 5 mg, Oral, Every 6 hours PRN    oxyCODONE (ROXICODONE) 5 mg, Oral, Every 8 hours PRN    Premarin 0.3 g, Vaginal, 3 times weekly    tamsulosin (FLOMAX) 0.4 mg, Oral, Daily with dinner    ursodiol (ACTIGALL) 900 mg, Oral, Daily     Allergies   Allergen Reactions    Oxaliplatin Shortness Of Breath     Reactions occurred with 2nd and 3rd infusions (about 1-3 hours from initiation of infusion) and required treatment with steroids and antihistamines. Please refer to allergy note on 2/7/2023 for detailed  "description of her reactions.    Morphine Nausea Only     \"Every dose made me nauseous\" (oral dosing only, can tolerate IV morphine)    Potassium Chloride Other (See Comments)     Pt reports \"burning\" with Iv potassium administration and wishes for it to be added to chart      Current Outpatient Medications on File Prior to Visit   Medication Sig Dispense Refill    acetaminophen (TYLENOL) 325 mg tablet Take 2 tablets (650 mg total) by mouth every 4 (four) hours as needed for mild pain      Acetaminophen Extra Strength 500 MG TABS TAKE 1 TABLET (500 MG TOTAL) BY MOUTH EVERY 6 (SIX) HOURS AS NEEDED FOR MILD PAIN 120 tablet 0    clotrimazole (GYNE-LOTRIMIN) 1 % vaginal cream Insert 1 applicator into the vagina daily at bedtime 45 g 0    cyanocobalamin (VITAMIN B-12) 1000 MCG tablet Take 1,000 mcg by mouth daily      estrogens, conjugated (Premarin) vaginal cream Insert 0.3 g into the vagina 3 (three) times a week 30 g 6    fenofibrate (TRICOR) 145 mg tablet Take 1 tablet (145 mg total) by mouth daily 90 tablet 3    ferrous sulfate 324 (65 Fe) mg Take 1 tablet (324 mg total) by mouth daily before breakfast 30 tablet 5    folic acid (FOLVITE) 1 mg tablet Take 1 tablet (1 mg total) by mouth daily 30 tablet 5    lidocaine (XYLOCAINE) 5 % ointment Apply topically as needed for mild pain 30 g 2    lisinopril (ZESTRIL) 5 mg tablet Take 1 tablet (5 mg total) by mouth daily 90 tablet 1    methotrexate 2.5 MG tablet Take 6 tablets (15 mg total) by mouth once a week 72 tablet 2    oxybutynin (DITROPAN) 5 mg tablet Take 1 tablet (5 mg total) by mouth every 6 (six) hours as needed (bladder spasms) 30 tablet 0    oxyCODONE (Roxicodone) 5 immediate release tablet Take 1 tablet (5 mg total) by mouth every 8 (eight) hours as needed for moderate pain Max Daily Amount: 15 mg 90 tablet 0    tamsulosin (FLOMAX) 0.4 mg Take 1 capsule (0.4 mg total) by mouth daily with dinner 90 capsule 3    ursodiol (ACTIGALL) 300 mg capsule TAKE 3 CAPSULES " "(900 MG TOTAL) BY MOUTH IN THE MORNING 90 capsule 11    docusate sodium (COLACE) 100 mg capsule Take 1 capsule (100 mg total) by mouth 2 (two) times a day for 15 days 30 capsule 0    hydrocortisone (ANUSOL-HC) 2.5 % rectal cream Apply topically 2 (two) times a day for 5 days 28 g 0    naproxen (NAPROSYN) 500 mg tablet Take 1 tablet (500 mg total) by mouth 2 (two) times a day with meals for 5 days For pain 10 tablet 0    ondansetron (ZOFRAN) 4 mg tablet Take 1 tablet (4 mg total) by mouth every 8 (eight) hours as needed for nausea or vomiting (Patient not taking: Reported on 1/15/2025) 20 tablet 2     No current facility-administered medications on file prior to visit.      Social History     Tobacco Use    Smoking status: Never     Passive exposure: Past    Smokeless tobacco: Never   Vaping Use    Vaping status: Never Used   Substance and Sexual Activity    Alcohol use: Never    Drug use: Never    Sexual activity: Not Currently     Partners: Male     Birth control/protection: Abstinence, Post-menopausal        Objective   BP 90/60 Comment: left upper extremity blood pressure rechecked by this provider  Temp (!) 97.2 °F (36.2 °C) (Temporal)   Ht 5' 2\" (1.575 m)   Wt 49.2 kg (108 lb 8 oz)   BMI 19.84 kg/m²     Physical Exam  Vitals reviewed.   Constitutional:       General: She is not in acute distress.     Appearance: She is not ill-appearing, toxic-appearing or diaphoretic.   HENT:      Head: Normocephalic and atraumatic.      Nose: Nose normal.      Mouth/Throat:      Mouth: Mucous membranes are moist.   Eyes:      General:         Right eye: No discharge.         Left eye: No discharge.   Cardiovascular:      Rate and Rhythm: Normal rate and regular rhythm.      Comments: Blood pressure manually rechecked by this provider of left upper extremity with findings of  90/60.  Pulmonary:      Effort: Pulmonary effort is normal. No respiratory distress.   Abdominal:      General: Abdomen is flat. There is no " distension.   Musculoskeletal:         General: No swelling.   Skin:     General: Skin is warm and dry.      Coloration: Skin is not jaundiced or pale.   Neurological:      General: No focal deficit present.      Mental Status: She is alert. Mental status is at baseline.   Psychiatric:         Mood and Affect: Mood normal.         Behavior: Behavior normal.         Thought Content: Thought content normal.         Judgment: Judgment normal.         Recent labs:  Lab Results   Component Value Date/Time    SODIUM 136 02/13/2025 10:58 AM    K 4.3 02/13/2025 10:58 AM    BUN 43 (H) 02/13/2025 10:58 AM    CREATININE 1.42 (H) 02/13/2025 10:58 AM    GLUC 102 01/22/2025 10:05 AM    CALCIUM 10.7 (H) 02/13/2025 10:58 AM    AST 44 (H) 02/05/2025 09:18 AM    ALT 48 02/05/2025 09:18 AM    ALB 4.2 02/05/2025 09:18 AM    TP 7.1 02/05/2025 09:18 AM    EGFR 41 02/13/2025 10:58 AM     Lab Results   Component Value Date/Time    HGB 8.3 (L) 02/05/2025 09:18 AM    WBC 4.66 02/05/2025 09:18 AM     02/05/2025 09:18 AM    INR 1.00 12/09/2024 08:18 AM    PTT 27 03/02/2024 03:57 AM     Lab Results   Component Value Date/Time    IWF2QIVCPBGU 1.063 09/13/2024 06:30 AM       Recent Imaging:  Procedure: Colonoscopy  Result Date: 2/3/2025  Impression: Healthy end-to-end colorectal anastomosis in the rectum RECOMMENDATION: Continue planned office followup to discuss/schedule ileostomy reversal operation Repeat colonoscopy in 1 year, due: 2/3/2026 Personal history of colon cancer Inadequate bowel preparation    Sree Umanzor MD     Procedure: FL retrograde pyelogram  Result Date: 1/17/2025  Impression: Fluoroscopic guidance provided for bilateral retrograde pyelogram. Please see separate procedure report for additional details. Workstation performed: [id number]     Procedure: CT abdomen pelvis with contrast  Result Date: 12/20/2024  Impression: Bilateral nephrolithiasis. Bilateral ureteral stents in place. There is bilateral mild  hydronephrosis. Bilateral periureteral fat stranding, correlate with urinalysis for infection. Workstation performed: [id number]     Procedure: POCT spirometry  Result Date: 12/2/2024  Impression: 12/02/2024 - Ratio 89%, FVC 2.93L (93%, Z -0.55), FEV1 2.60L (106%, Z 0.44) - normal spirometry    Procedure: CT abdomen pelvis with contrast  Result Date: 11/15/2024  Impression: 1.  Multiple pulmonary nodules in the imaged lung bases measuring up to 8 mm are again seen, slightly increased in size and number compared to the prior exam. 2.  Splenomegaly without focal lesions. 3.  Mild left and moderate right hydronephrosis. Nonobstructing calculi in the lower pole of the right kidney. Bilateral ureteral stents noted in place with distal pigtail loops within the posterior inferior bladder and proximal pigtail loops in the left  upper pole collecting system and right renal pelvic regions.  Asymmetrical right perinephric hazy inflammatory stranding noted. Recommend correlation with urinalysis to exclude right  tract infection. 4.  Right ventral abdominal ileostomy. Dilated distal ileal loops prior to the ileostomy site containing heterogeneous density debris. Findings may indicate focal ileus or dysmotility. No evidence for bowel obstruction, mesenteric inflammation, appendicitis, free air, or free fluid. Workstation performed: [id number]       Administrative Statements   I have spent a total time of 24 minutes in caring for this patient on the day of the visit/encounter including Risks and benefits of tx options, Instructions for management, Patient and family education, Importance of tx compliance, Risk factor reductions, Impressions, Counseling / Coordination of care, Documenting in the medical record, Reviewing/placing orders in the medical record (including tests, medications, and/or procedures), and Obtaining or reviewing history  .   Topics discussed with the patient / family include symptom assessment and management,  medication review, psychosocial support, supportive listening, and anticipatory guidance.

## 2025-03-04 NOTE — ASSESSMENT & PLAN NOTE
Initial blood pressure today in office of 80/46 (unsure of how accurate this was therefore, this provider completed a manual repeat of BP to the left upper extremity with cuff directly over skin and not over clothing).  -repeat BP was noted at 90/60.    Patient has not taken her blood pressure readings at home.  She has not taken her blood pressure medication for today (Lisinopril 5 mg).  -advised to hold on her blood pressure medication today (Lisinopril) and to reach out to her PCP for follow-up accordingly.  -patient and ex- aware of recommendations.

## 2025-03-04 NOTE — ASSESSMENT & PLAN NOTE
Psychosocial   Supportive listening provided  Normalized experience of patient/family  Provided anxiety containment     Referrals Placed / Medical Equipment Ordered  -None    Follow-Up Recommendations  -Follow-up with PCP and current medical specialists  -Follow-up with palliative care: 3 months

## 2025-03-04 NOTE — PATIENT INSTRUCTIONS
It was a pleasure speaking with you today.    As discussed:  -Continue your medications as prescribed.  -Continue with a bowel regimen to avoid constipation.    Follow-up in: 3 months     Please do not hesitate to call me sooner should you have any questions/concerns or needs.    Medication safety issues - Do not drive under the influence of narcotics (including opioids), watch for adverse effects including confusion / altered mental status / respiratory depression (slowed breathing), keep medications stored in a safe/locked environment, do not use alcohol while opioids or other narcotics are in your system. Do not travel with more than the minimum number of tablets or capsules required for the trip.    ER Precautions  Watch for red flag symptoms including, but not limited to fevers, chills, chest pain, shortness of breath, intractable nausea/vomiting/diarrhea, or acute intractable pain (especially if pain is new or has changed).

## 2025-03-04 NOTE — ASSESSMENT & PLAN NOTE
Ongoing for many years  -states typically aggravated by activities such as standing for too long or washing the dishes and having to stand for an extended period of time.  Takes tylenol which helps along with lying down to rest her back.    No chest pain, shortness of breath on exam.

## 2025-03-05 ENCOUNTER — TELEPHONE (OUTPATIENT)
Dept: INTERNAL MEDICINE CLINIC | Facility: CLINIC | Age: 60
End: 2025-03-05

## 2025-03-05 ENCOUNTER — HOSPITAL ENCOUNTER (OUTPATIENT)
Dept: INFUSION CENTER | Facility: CLINIC | Age: 60
Discharge: HOME | End: 2025-03-05
Payer: COMMERCIAL

## 2025-03-05 DIAGNOSIS — Z79.899 HIGH RISK MEDICATION USE: ICD-10-CM

## 2025-03-05 DIAGNOSIS — Z95.828 PORT-A-CATH IN PLACE: Primary | ICD-10-CM

## 2025-03-05 DIAGNOSIS — D86.9 SARCOIDOSIS: ICD-10-CM

## 2025-03-05 LAB
BASOPHILS # BLD AUTO: 0.01 THOUSANDS/ÂΜL (ref 0–0.1)
BASOPHILS NFR BLD AUTO: 0 % (ref 0–1)
EOSINOPHIL # BLD AUTO: 0.19 THOUSAND/ÂΜL (ref 0–0.61)
EOSINOPHIL NFR BLD AUTO: 5 % (ref 0–6)
ERYTHROCYTE [DISTWIDTH] IN BLOOD BY AUTOMATED COUNT: 14 % (ref 11.6–15.1)
HCT VFR BLD AUTO: 20.5 % (ref 34.8–46.1)
HGB BLD-MCNC: 6.8 G/DL (ref 11.5–15.4)
IMM GRANULOCYTES # BLD AUTO: 0.02 THOUSAND/UL (ref 0–0.2)
IMM GRANULOCYTES NFR BLD AUTO: 1 % (ref 0–2)
LYMPHOCYTES # BLD AUTO: 0.35 THOUSANDS/ÂΜL (ref 0.6–4.47)
LYMPHOCYTES NFR BLD AUTO: 10 % (ref 14–44)
MCH RBC QN AUTO: 32.4 PG (ref 26.8–34.3)
MCHC RBC AUTO-ENTMCNC: 33.2 G/DL (ref 31.4–37.4)
MCV RBC AUTO: 98 FL (ref 82–98)
MONOCYTES # BLD AUTO: 0.32 THOUSAND/ÂΜL (ref 0.17–1.22)
MONOCYTES NFR BLD AUTO: 9 % (ref 4–12)
NEUTROPHILS # BLD AUTO: 2.76 THOUSANDS/ÂΜL (ref 1.85–7.62)
NEUTS SEG NFR BLD AUTO: 75 % (ref 43–75)
NRBC BLD AUTO-RTO: 0 /100 WBCS
PLATELET # BLD AUTO: 342 THOUSANDS/UL (ref 149–390)
PMV BLD AUTO: 9.1 FL (ref 8.9–12.7)
RBC # BLD AUTO: 2.1 MILLION/UL (ref 3.81–5.12)
WBC # BLD AUTO: 3.65 THOUSAND/UL (ref 4.31–10.16)

## 2025-03-05 PROCEDURE — 85025 COMPLETE CBC W/AUTO DIFF WBC: CPT

## 2025-03-05 NOTE — PROGRESS NOTES
Pt to infusion for labs. She offers no complaints.  Port accessed, labs drawn per MD order.  Next appointment 4/3 0930, she declined AVS

## 2025-03-05 NOTE — TELEPHONE ENCOUNTER
I received msg from infusion center RN regarding pt's CBC. It was ordered by her rheumatology. It showed worse anemia.     Will have our staff reach out to patient regarding this. If there is ongoing bleeding, SOB, chest pain, go to ER. If otherwise stable, will offer an office visit in the next 1-2 day, for anemia evaluation, could help get outpatient transfusion.         Hi Dr Canseco I drew labs for Danielle this morning.  Not sure if the lab called your office but her hgb 6.8.  I just wanted to make sure you were aware, so your office can contact for further instructions.  12:12 PM  ODILON Canseco MD  Hi yes I saw the results, I would have her PCP arrange for blood / iron transfusion.  12:15 PM  Janet Gustafson RN was added by Janet Reed RN.  12:20 PM    EFREM Bar I added you to this in case Maria M wants to manage this  12:21 PM  Janet Gustafson RN  patient seen by Dr. Pandya once in October- she is on surveillance. we do not have a blood consent on file - and looks like her iron/anemia is being followed by her pcp   RONEN  we can reach out for a follow up appt if neede

## 2025-03-06 ENCOUNTER — HOSPITAL ENCOUNTER (EMERGENCY)
Facility: HOSPITAL | Age: 60
Discharge: HOME | End: 2025-03-06
Attending: STUDENT IN AN ORGANIZED HEALTH CARE EDUCATION/TRAINING PROGRAM
Payer: COMMERCIAL

## 2025-03-06 ENCOUNTER — TELEPHONE (OUTPATIENT)
Dept: INTERNAL MEDICINE CLINIC | Facility: CLINIC | Age: 60
End: 2025-03-06

## 2025-03-06 VITALS
OXYGEN SATURATION: 100 % | TEMPERATURE: 98 F | SYSTOLIC BLOOD PRESSURE: 117 MMHG | RESPIRATION RATE: 16 BRPM | HEART RATE: 95 BPM | DIASTOLIC BLOOD PRESSURE: 63 MMHG

## 2025-03-06 DIAGNOSIS — D64.9 SYMPTOMATIC ANEMIA: Primary | ICD-10-CM

## 2025-03-06 LAB
ABO GROUP BLD: NORMAL
ANION GAP SERPL CALCULATED.3IONS-SCNC: 7 MMOL/L (ref 4–13)
APTT PPP: 28 SECONDS (ref 23–34)
BASOPHILS # BLD AUTO: 0.01 THOUSANDS/ÂΜL (ref 0–0.1)
BASOPHILS NFR BLD AUTO: 0 % (ref 0–1)
BLD GP AB SCN SERPL QL: NEGATIVE
BUN SERPL-MCNC: 54 MG/DL (ref 5–25)
CALCIUM SERPL-MCNC: 10 MG/DL (ref 8.4–10.2)
CHLORIDE SERPL-SCNC: 109 MMOL/L (ref 96–108)
CO2 SERPL-SCNC: 20 MMOL/L (ref 21–32)
CREAT SERPL-MCNC: 1.5 MG/DL (ref 0.6–1.3)
EOSINOPHIL # BLD AUTO: 0.15 THOUSAND/ÂΜL (ref 0–0.61)
EOSINOPHIL NFR BLD AUTO: 2 % (ref 0–6)
ERYTHROCYTE [DISTWIDTH] IN BLOOD BY AUTOMATED COUNT: 14.2 % (ref 11.6–15.1)
GFR SERPL CREATININE-BSD FRML MDRD: 38 ML/MIN/1.73SQ M
GLUCOSE SERPL-MCNC: 93 MG/DL (ref 65–140)
HCT VFR BLD AUTO: 20.3 % (ref 34.8–46.1)
HGB BLD-MCNC: 6.8 G/DL (ref 11.5–15.4)
IMM GRANULOCYTES # BLD AUTO: 0.05 THOUSAND/UL (ref 0–0.2)
IMM GRANULOCYTES NFR BLD AUTO: 1 % (ref 0–2)
INR PPP: 1.09 (ref 0.85–1.19)
LYMPHOCYTES # BLD AUTO: 0.31 THOUSANDS/ÂΜL (ref 0.6–4.47)
LYMPHOCYTES NFR BLD AUTO: 5 % (ref 14–44)
MCH RBC QN AUTO: 32.7 PG (ref 26.8–34.3)
MCHC RBC AUTO-ENTMCNC: 33.5 G/DL (ref 31.4–37.4)
MCV RBC AUTO: 98 FL (ref 82–98)
MONOCYTES # BLD AUTO: 0.36 THOUSAND/ÂΜL (ref 0.17–1.22)
MONOCYTES NFR BLD AUTO: 5 % (ref 4–12)
NEUTROPHILS # BLD AUTO: 5.91 THOUSANDS/ÂΜL (ref 1.85–7.62)
NEUTS SEG NFR BLD AUTO: 87 % (ref 43–75)
NRBC BLD AUTO-RTO: 0 /100 WBCS
PLATELET # BLD AUTO: 320 THOUSANDS/UL (ref 149–390)
PMV BLD AUTO: 8.6 FL (ref 8.9–12.7)
POTASSIUM SERPL-SCNC: 4.1 MMOL/L (ref 3.5–5.3)
PROTHROMBIN TIME: 14.8 SECONDS (ref 12.3–15)
RBC # BLD AUTO: 2.08 MILLION/UL (ref 3.81–5.12)
RH BLD: POSITIVE
SODIUM SERPL-SCNC: 136 MMOL/L (ref 135–147)
SPECIMEN EXPIRATION DATE: NORMAL
WBC # BLD AUTO: 6.79 THOUSAND/UL (ref 4.31–10.16)

## 2025-03-06 PROCEDURE — 85025 COMPLETE CBC W/AUTO DIFF WBC: CPT | Performed by: STUDENT IN AN ORGANIZED HEALTH CARE EDUCATION/TRAINING PROGRAM

## 2025-03-06 PROCEDURE — 86850 RBC ANTIBODY SCREEN: CPT | Performed by: STUDENT IN AN ORGANIZED HEALTH CARE EDUCATION/TRAINING PROGRAM

## 2025-03-06 PROCEDURE — 85610 PROTHROMBIN TIME: CPT | Performed by: STUDENT IN AN ORGANIZED HEALTH CARE EDUCATION/TRAINING PROGRAM

## 2025-03-06 PROCEDURE — 36415 COLL VENOUS BLD VENIPUNCTURE: CPT | Performed by: STUDENT IN AN ORGANIZED HEALTH CARE EDUCATION/TRAINING PROGRAM

## 2025-03-06 PROCEDURE — 99284 EMERGENCY DEPT VISIT MOD MDM: CPT | Performed by: STUDENT IN AN ORGANIZED HEALTH CARE EDUCATION/TRAINING PROGRAM

## 2025-03-06 PROCEDURE — 86900 BLOOD TYPING SEROLOGIC ABO: CPT | Performed by: STUDENT IN AN ORGANIZED HEALTH CARE EDUCATION/TRAINING PROGRAM

## 2025-03-06 PROCEDURE — 86901 BLOOD TYPING SEROLOGIC RH(D): CPT | Performed by: STUDENT IN AN ORGANIZED HEALTH CARE EDUCATION/TRAINING PROGRAM

## 2025-03-06 PROCEDURE — 86923 COMPATIBILITY TEST ELECTRIC: CPT

## 2025-03-06 PROCEDURE — 80048 BASIC METABOLIC PNL TOTAL CA: CPT | Performed by: STUDENT IN AN ORGANIZED HEALTH CARE EDUCATION/TRAINING PROGRAM

## 2025-03-06 PROCEDURE — 99283 EMERGENCY DEPT VISIT LOW MDM: CPT

## 2025-03-06 PROCEDURE — 36430 TRANSFUSION BLD/BLD COMPNT: CPT

## 2025-03-06 PROCEDURE — P9016 RBC LEUKOCYTES REDUCED: HCPCS

## 2025-03-06 PROCEDURE — 85730 THROMBOPLASTIN TIME PARTIAL: CPT | Performed by: STUDENT IN AN ORGANIZED HEALTH CARE EDUCATION/TRAINING PROGRAM

## 2025-03-06 NOTE — ED PROVIDER NOTES
ED Disposition       None          Assessment & Plan   {Hyperlinks  Risk Stratification - NIHSS - HEART SCORE - Fill out sepsis note and make sure you call [phone] if severe or septic shock:[id number]}    Medical Decision Making  59-year-old female with history of hemochromatosis, colon cancer status post resection with an ostomy, iron deficiency anemia requiring serial transfusions, CKD who presents with generalized fatigue times months and outpatient lab testing with a hemoglobin of 6.8.  She denies hematochezia/melena, hematuria, vaginal bleeding, easy bruising/bleeding, chest pain, shortness of breath, lightheadedness/dizziness, or palpitations.    Vitals with tachycardia but otherwise stable and afebrile.  Pale appearing.      Differential diagnosis includes but is not limited to: Acute blood loss anemia, anemia of chronic disease    Workup and treatment as below:    Imaging: [N/A]  EKG: [See ED Course]  Labs: See ED course  Meds/interventions: [N/A]   Consults: [N/A]  Reassessment: [N/A]    Dispo: [{ Dispo:24114}]    Amount and/or Complexity of Data Reviewed  Labs: ordered.             Medications - No data to display    ED Risk Strat Scores                                                History of Present Illness   {Hyperlinks  History (Med, Surg, Fam, Social) - Current Medications - Allergies  :[id number]}    Chief Complaint   Patient presents with    Abnormal Lab     Patient presents with hemoglobin of 6.8 on lab work yesterday       Past Medical History:   Diagnosis Date    Bleeding from colostomy stoma (HCC) 02/11/2023     states ileostomy , not colostomy    Cancer (HCC)     Colon cancer (HCC)     Elevated serum creatinine 09/11/2024    Fall     Headache     Hemochromatosis, unspecified     History of transfusion     2022 - no adverse reaction    Hyperlipidemia     Hypertension     Hypokalemia 09/12/2024    Kidney calculi     Kidney stone     Liver disease     hemangioma    Muscle weakness      Personal history of COVID-19 12/2022    Sarcoidosis     Seizures (HCC)     2022    Shingles       Past Surgical History:   Procedure Laterality Date    ABSCESS DRAINAGE      left breast    BREAST BIOPSY      CHEST TUBE INSERTION      COLON SURGERY      colostomy    COLONOSCOPY      FL GUIDED CENTRAL VENOUS ACCESS DEVICE INSERTION  03/21/2023    FL RETROGRADE PYELOGRAM  01/23/2023    FL RETROGRADE PYELOGRAM  04/03/2023    FL RETROGRADE PYELOGRAM  7/21/2023    FL RETROGRADE PYELOGRAM  10/11/2023    FL RETROGRADE PYELOGRAM  12/15/2023    FL RETROGRADE PYELOGRAM  2/8/2024    FL RETROGRADE PYELOGRAM  5/10/2024    FL RETROGRADE PYELOGRAM  9/26/2024    FL RETROGRADE PYELOGRAM  10/18/2024    FL RETROGRADE PYELOGRAM  1/17/2025    IR BIOPSY LIVER MASS  05/09/2023    LUNG BIOPSY      VA CYSTO W/INSERT URETERAL STENT Bilateral 5/10/2024    Procedure: EXCHANGE STENT URETERAL;  Surgeon: José Luis Stephens MD;  [facility];  Service: Urology    VA CYSTO W/INSERT URETERAL STENT Bilateral 10/18/2024    Procedure: EXCHANGE STENT URETERAL;  Surgeon: José Luis Stephens MD;  [facility];  Service: Urology    VA CYSTO/URETERO W/LITHOTRIPSY &INDWELL STENT INSRT Bilateral 01/23/2023    Procedure: CYSTOSCOPY  RIGHT URETEROSCOPY WITH LITHOTRIPSY HOLMIUM LASER, BILATERAL RETROGRADE PYELOGRAM AND BILATERAL  URETERAL STENT INSERTION;  Surgeon: José Luis Stephens MD;  [facility];  Service: Urology    VA CYSTO/URETERO W/LITHOTRIPSY &INDWELL STENT INSRT Right 04/03/2023    Procedure: RIGHT URETEROSCOPY WITH LITHOTRIPSY HOLMIUM LASER, RETROGRADE PYELOGRAM; BILATERAL EXCHANGE STENT URETERAL;  Surgeon: José Luis Stephens MD;  [facility];  Service: Urology    VA CYSTO/URETERO W/LITHOTRIPSY &INDWELL STENT INSRT Bilateral 10/11/2023    Procedure: CYSTOSCOPY URETEROSCOPY RETROGRADE PYELOGRAM AND INSERTION STENT URETERAL DIAGNOSTINC RIGHT URETEROSCOPY, REMOVAL STENT LEFT SIDE;  Surgeon: José Luis  MD Angelo;  [facility];  Service: Urology    MA CYSTO/URETERO W/LITHOTRIPSY &INDWELL STENT INSRT Bilateral 9/26/2024    Procedure: CYSTOSCOPY ,LEFT  URETEROSCOPY,  BILATERAL  RETROGRADE PYELOGRAM AND BILATERAL URETERAL STENT EXCHANGE;  Surgeon: Favian Barber MD;  [facility];  Service: Urology    MA CYSTO/URETERO W/LITHOTRIPSY &INDWELL STENT INSRT Left 10/18/2024    Procedure: CYSTOSCOPY LEFT URETEROSCOPY, treatment of LEFT ureteral stricture, RETROGRADE PYELOGRAM AND BILATERAL STENT EXCHANGE;  Surgeon: José Luis Stephens MD;  [facility];  Service: Urology    MA CYSTOURETHROSCOPY W/URETERAL CATHETERIZATION Bilateral 07/21/2023    Procedure: CYSTOSCOPY URETEROSCOPY RETROGRADE PYELOGRAM WITH BILATERAL STENT URETERAL EXCHANGE; LEFT LASER LITHOTRIPSY AND STONE BASKET RETRIEVAL;  Surgeon: José Luis Stephens MD;  [facility];  Service: Urology    MA CYSTOURETHROSCOPY W/URETERAL CATHETERIZATION Bilateral 12/15/2023    Procedure: CYSTOSCOPY, BILATERAL  RETROGRADE PYELOGRAM , STENT EXCHANGE RIGHT , LEFT URETEROSCOPY ,  HOLMIUM LASER WITH INSERTION LEFT URETERAL STENT;  Surgeon: Derick Bhakta MD;  [facility];  Service: Urology    MA CYSTOURETHROSCOPY W/URETERAL CATHETERIZATION Left 2/8/2024    Procedure: CYSTOSCOPY B/L RETROGRADE PYELOGRAM WITH B/L T URETERALSTENT EXCHANGE,LEFT URETEROSCOPY, DILATION LEFT URETERAL STICTURE;  Surgeon: José Luis Stephens MD;  [facility];  Service: Urology    MA CYSTOURETHROSCOPY W/URETERAL CATHETERIZATION Bilateral 5/10/2024    Procedure: CYSTOSCOPY RETROGRADE PYELOGRAM WITH INSERTION STENT URETERAL;  Surgeon: José Luis Stephens MD;  [facility];  Service: Urology    MA CYSTOURETHROSCOPY W/URETERAL CATHETERIZATION Right 1/17/2025    Procedure: CYSTOSCOPY RETROGRADE PYELOGRAM WITH INSERTION STENT URETERAL, LEFT RETROGRADE PYELOGRAM AND STENT REMOVAL;  Surgeon: José Luis Stephens MD;   [facility];  Service: Urology    GA INSJ TUNNELED CTR VAD W/SUBQ PORT AGE 5 YR/> N/A 03/21/2023    Procedure: INSERTION VENOUS PORT ( PORT-A-CATH) IR;  Surgeon: Mark Amos DO;  [facility];  Service: Interventional Radiology    GA LAPS COLECTOMY PRTL W/COLOPXTSTMY LW ANAST N/A 8/17/2023    Procedure: RESECTION COLON LOW ANTERIOR LAPAROSCOPIC WITH ROBOTICS;  Surgeon: Sree Umanzor MD;  [facility];  Service: Colorectal    TONSILLECTOMY      URINARY SURGERY Bilateral     bilateral stents      Family History   Problem Relation Age of Onset    Cancer Mother     Cirrhosis Father     Diabetes Father     Breast cancer Neg Hx     Breast cancer additional onset Neg Hx       Social History     Tobacco Use    Smoking status: Never     Passive exposure: Past    Smokeless tobacco: Never   Vaping Use    Vaping status: Never Used   Substance Use Topics    Alcohol use: Never    Drug use: Never      E-Cigarette/Vaping    E-Cigarette Use Never User       E-Cigarette/Vaping Substances    Nicotine No     THC No     CBD No     Flavoring No       I have reviewed and agree with the history as documented.     59-year-old female with history of hemochromatosis, colon cancer status post resection with an ostomy, iron deficiency anemia requiring serial transfusions, CKD who presents with generalized fatigue times months and outpatient lab testing with a hemoglobin of 6.8.  She denies hematochezia/melena, hematuria, vaginal bleeding, easy bruising/bleeding, chest pain, shortness of breath, lightheadedness/dizziness, or palpitations.          Review of Systems   Constitutional:  Positive for fatigue. Negative for chills and fever.   HENT:  Negative for ear pain and sore throat.    Eyes:  Negative for pain and visual disturbance.   Respiratory:  Negative for cough and shortness of breath.    Cardiovascular:  Negative for chest pain and palpitations.   Gastrointestinal:  Negative for abdominal pain,  blood in stool, constipation, diarrhea, nausea and vomiting.   Genitourinary:  Negative for dysuria and hematuria.   Musculoskeletal:  Negative for arthralgias and back pain.   Skin:  Negative for color change and rash.   Neurological:  Negative for dizziness, seizures, syncope, facial asymmetry, speech difficulty, weakness, light-headedness, numbness and headaches.   All other systems reviewed and are negative.          Objective   {Hyperlinks  Historical Vitals - Historical Labs - Chart Review/Microbiology - Last Echo - Code Status  :[id number]}    ED Triage Vitals [03/06/25 1244]   Temperature Pulse Blood Pressure Respirations SpO2 Patient Position - Orthostatic VS   98.3 °F (36.8 °C) (!) 113 106/63 18 100 % Sitting      Temp Source Heart Rate Source BP Location FiO2 (%) Pain Score    Oral Monitor Right arm -- --      Vitals      Date and Time Temp Pulse SpO2 Resp BP Pain Score FACES Pain Rating User   03/06/25 1244 98.3 °F (36.8 °C) 113 100 % 18 106/63 -- -- EV            Physical Exam  Constitutional:       General: She is not in acute distress.     Appearance: Normal appearance. She is not ill-appearing or toxic-appearing.   HENT:      Head: Normocephalic.      Nose: Nose normal.      Mouth/Throat:      Mouth: Mucous membranes are moist.      Pharynx: Oropharynx is clear.   Eyes:      Comments: Conjunctival pallor   Cardiovascular:      Rate and Rhythm: Regular rhythm. Tachycardia present.      Comments: R chest port in place  Pulmonary:      Effort: Pulmonary effort is normal.   Skin:     General: Skin is dry.      Capillary Refill: Capillary refill takes 2 to 3 seconds.      Coloration: Skin is pale.   Neurological:      Mental Status: She is alert and oriented to person, place, and time.   Psychiatric:         Mood and Affect: Mood normal.         Behavior: Behavior normal.         Results Reviewed       Procedure Component Value Units Date/Time    CBC and differential [[id number]]     Lab Status: No result  Specimen: Blood     Basic metabolic panel [[id number]]     Lab Status: No result Specimen: Blood     Protime-INR [[id number]]     Lab Status: No result Specimen: Blood     APTT [[id number]]     Lab Status: No result Specimen: Blood             No orders to display       Procedures    ED Medication and Procedure Management   Prior to Admission Medications   Prescriptions Last Dose Informant Patient Reported? Taking?   Acetaminophen Extra Strength 500 MG TABS   No No   Sig: TAKE 1 TABLET (500 MG TOTAL) BY MOUTH EVERY 6 (SIX) HOURS AS NEEDED FOR MILD PAIN   acetaminophen (TYLENOL) 325 mg tablet   No No   Sig: Take 2 tablets (650 mg total) by mouth every 4 (four) hours as needed for mild pain   clotrimazole (GYNE-LOTRIMIN) 1 % vaginal cream   No No   Sig: Insert 1 applicator into the vagina daily at bedtime   cyanocobalamin (VITAMIN B-12) 1000 MCG tablet  Self Yes No   Sig: Take 1,000 mcg by mouth daily   docusate sodium (COLACE) 100 mg capsule  Self No No   Sig: Take 1 capsule (100 mg total) by mouth 2 (two) times a day for 15 days   estrogens, conjugated (Premarin) vaginal cream  Self No No   Sig: Insert 0.3 g into the vagina 3 (three) times a week   fenofibrate (TRICOR) 145 mg tablet   No No   Sig: Take 1 tablet (145 mg total) by mouth daily   ferrous sulfate 324 (65 Fe) mg   No No   Sig: Take 1 tablet (324 mg total) by mouth daily before breakfast   folic acid (FOLVITE) 1 mg tablet   No No   Sig: Take 1 tablet (1 mg total) by mouth daily   hydrocortisone (ANUSOL-HC) 2.5 % rectal cream   No No   Sig: Apply topically 2 (two) times a day for 5 days   lidocaine (XYLOCAINE) 5 % ointment   No No   Sig: Apply topically as needed for mild pain   lisinopril (ZESTRIL) 5 mg tablet   No No   Sig: Take 1 tablet (5 mg total) by mouth daily   methotrexate 2.5 MG tablet   No No   Sig: Take 6 tablets (15 mg total) by mouth once a week   naproxen (NAPROSYN) 500 mg tablet   No No   Sig: Take 1 tablet (500 mg total) by mouth 2 (two) times  a day with meals for 5 days For pain   ondansetron (ZOFRAN) 4 mg tablet  Self No No   Sig: Take 1 tablet (4 mg total) by mouth every 8 (eight) hours as needed for nausea or vomiting   Patient not taking: Reported on 1/15/2025   oxyCODONE (Roxicodone) 5 immediate release tablet   No No   Sig: Take 1 tablet (5 mg total) by mouth every 8 (eight) hours as needed for moderate pain Max Daily Amount: 15 mg   oxybutynin (DITROPAN) 5 mg tablet  Self No No   Sig: Take 1 tablet (5 mg total) by mouth every 6 (six) hours as needed (bladder spasms)   tamsulosin (FLOMAX) 0.4 mg  Self No No   Sig: Take 1 capsule (0.4 mg total) by mouth daily with dinner   ursodiol (ACTIGALL) 300 mg capsule   No No   Sig: TAKE 3 CAPSULES (900 MG TOTAL) BY MOUTH IN THE MORNING      Facility-Administered Medications: None     Patient's Medications   Discharge Prescriptions    No medications on file     No discharge procedures on file.  ED SEPSIS DOCUMENTATION          Management   Prior to Admission Medications   Prescriptions Last Dose Informant Patient Reported? Taking?   Acetaminophen Extra Strength 500 MG TABS   No No   Sig: TAKE 1 TABLET (500 MG TOTAL) BY MOUTH EVERY 6 (SIX) HOURS AS NEEDED FOR MILD PAIN   acetaminophen (TYLENOL) 325 mg tablet   No No   Sig: Take 2 tablets (650 mg total) by mouth every 4 (four) hours as needed for mild pain   clotrimazole (GYNE-LOTRIMIN) 1 % vaginal cream   No No   Sig: Insert 1 applicator into the vagina daily at bedtime   cyanocobalamin (VITAMIN B-12) 1000 MCG tablet  Self Yes No   Sig: Take 1,000 mcg by mouth daily   docusate sodium (COLACE) 100 mg capsule  Self No No   Sig: Take 1 capsule (100 mg total) by mouth 2 (two) times a day for 15 days   estrogens, conjugated (Premarin) vaginal cream  Self No No   Sig: Insert 0.3 g into the vagina 3 (three) times a week   fenofibrate (TRICOR) 145 mg tablet   No No   Sig: Take 1 tablet (145 mg total) by mouth daily   ferrous sulfate 324 (65 Fe) mg   No No   Sig: Take 1 tablet (324 mg total) by mouth daily before breakfast   folic acid (FOLVITE) 1 mg tablet   No No   Sig: Take 1 tablet (1 mg total) by mouth daily   hydrocortisone (ANUSOL-HC) 2.5 % rectal cream   No No   Sig: Apply topically 2 (two) times a day for 5 days   lidocaine (XYLOCAINE) 5 % ointment   No No   Sig: Apply topically as needed for mild pain   lisinopril (ZESTRIL) 5 mg tablet   No No   Sig: Take 1 tablet (5 mg total) by mouth daily   methotrexate 2.5 MG tablet   No No   Sig: Take 6 tablets (15 mg total) by mouth once a week   naproxen (NAPROSYN) 500 mg tablet   No No   Sig: Take 1 tablet (500 mg total) by mouth 2 (two) times a day with meals for 5 days For pain   ondansetron (ZOFRAN) 4 mg tablet  Self No No   Sig: Take 1 tablet (4 mg total) by mouth every 8 (eight) hours as needed for nausea or vomiting   Patient not taking: Reported on 1/15/2025   oxyCODONE (Roxicodone) 5 immediate release tablet   No No   Sig: Take 1 tablet (5  mg total) by mouth every 8 (eight) hours as needed for moderate pain Max Daily Amount: 15 mg   oxybutynin (DITROPAN) 5 mg tablet  Self No No   Sig: Take 1 tablet (5 mg total) by mouth every 6 (six) hours as needed (bladder spasms)   tamsulosin (FLOMAX) 0.4 mg  Self No No   Sig: Take 1 capsule (0.4 mg total) by mouth daily with dinner   ursodiol (ACTIGALL) 300 mg capsule   No No   Sig: TAKE 3 CAPSULES (900 MG TOTAL) BY MOUTH IN THE MORNING      Facility-Administered Medications: None     Discharge Medication List as of 3/6/2025  4:59 PM        CONTINUE these medications which have NOT CHANGED    Details   !! acetaminophen (TYLENOL) 325 mg tablet Take 2 tablets (650 mg total) by mouth every 4 (four) hours as needed for mild pain, Starting Fri 1/17/2025, No Print      !! Acetaminophen Extra Strength 500 MG TABS TAKE 1 TABLET (500 MG TOTAL) BY MOUTH EVERY 6 (SIX) HOURS AS NEEDED FOR MILD PAIN, Normal      clotrimazole (GYNE-LOTRIMIN) 1 % vaginal cream Insert 1 applicator into the vagina daily at bedtime, Starting Mon 12/23/2024, Normal      cyanocobalamin (VITAMIN B-12) 1000 MCG tablet Take 1,000 mcg by mouth daily, Historical Med      docusate sodium (COLACE) 100 mg capsule Take 1 capsule (100 mg total) by mouth 2 (two) times a day for 15 days, Starting Fri 10/18/2024, Until Mon 11/11/2024, Normal      estrogens, conjugated (Premarin) vaginal cream Insert 0.3 g into the vagina 3 (three) times a week, Starting Fri 5/10/2024, Normal      fenofibrate (TRICOR) 145 mg tablet Take 1 tablet (145 mg total) by mouth daily, Starting Tue 1/21/2025, Until Wed 1/21/2026, Normal      ferrous sulfate 324 (65 Fe) mg Take 1 tablet (324 mg total) by mouth daily before breakfast, Starting Wed 2/19/2025, Normal      folic acid (FOLVITE) 1 mg tablet Take 1 tablet (1 mg total) by mouth daily, Starting Tue 11/26/2024, Normal      hydrocortisone (ANUSOL-HC) 2.5 % rectal cream Apply topically 2 (two) times a day for 5 days, Starting Tue  12/31/2024, Until Wed 1/15/2025, Normal      lidocaine (XYLOCAINE) 5 % ointment Apply topically as needed for mild pain, Starting Tue 1/14/2025, Normal      lisinopril (ZESTRIL) 5 mg tablet Take 1 tablet (5 mg total) by mouth daily, Starting Mon 11/11/2024, Normal      methotrexate 2.5 MG tablet Take 6 tablets (15 mg total) by mouth once a week, Starting Tue 11/26/2024, Normal      naproxen (NAPROSYN) 500 mg tablet Take 1 tablet (500 mg total) by mouth 2 (two) times a day with meals for 5 days For pain, Starting Fri 1/17/2025, Until Wed 1/22/2025, Normal      ondansetron (ZOFRAN) 4 mg tablet Take 1 tablet (4 mg total) by mouth every 8 (eight) hours as needed for nausea or vomiting, Starting Thu 7/25/2024, Normal      oxybutynin (DITROPAN) 5 mg tablet Take 1 tablet (5 mg total) by mouth every 6 (six) hours as needed (bladder spasms), Starting Fri 10/18/2024, Normal      oxyCODONE (Roxicodone) 5 immediate release tablet Take 1 tablet (5 mg total) by mouth every 8 (eight) hours as needed for moderate pain Max Daily Amount: 15 mg, Starting Thu 1/2/2025, Normal      tamsulosin (FLOMAX) 0.4 mg Take 1 capsule (0.4 mg total) by mouth daily with dinner, Starting Fri 8/2/2024, Normal      ursodiol (ACTIGALL) 300 mg capsule TAKE 3 CAPSULES (900 MG TOTAL) BY MOUTH IN THE MORNING, Starting Wed 1/15/2025, Until Thu 1/15/2026, Normal       !! - Potential duplicate medications found. Please discuss with provider.        No discharge procedures on file.  ED SEPSIS DOCUMENTATION   Time reflects when diagnosis was documented in both MDM as applicable and the Disposition within this note       Time User Action Codes Description Comment    3/6/2025  3:27 PM Koki Engel Add [D64.9] Symptomatic anemia                  Koki Engel MD  03/10/25 2011

## 2025-03-06 NOTE — TELEPHONE ENCOUNTER
Pt  called for clarification on ER status. Pt  was advised to take Pt to [facility] for evaluation asap.As per Dr. Quiroz.

## 2025-03-06 NOTE — DISCHARGE INSTRUCTIONS
You were seen in the Emergency Department for: Anemia and fatigue    Your workup today showed: Low blood cell count with reassuring physical exam    Your next steps should include: Please call today to make an appointment with your primary care provider in one week.    Reasons to RETURN IMMEDIATELY to the Emergency Department: worsening symptoms, difficulty breathing, temperature > 100.4 degrees, uncontrollable nausea/vomiting, or any other concerns.

## 2025-03-06 NOTE — ED NOTES
Patient has no complaints through first 15 minutes of blood transfusion.      Enoc Farias, RN  03/06/25 7242

## 2025-03-06 NOTE — ED NOTES
Blood transfusion complete. No signs of reaction noted. Patient has no complaints at this time.      Enoc Farias RN  03/06/25 7132

## 2025-03-06 NOTE — ED NOTES
Patient prefers to defer IV in triage, would rather use her port.      Silviano Barajas, RN  03/06/25 2468

## 2025-03-07 LAB
ABO GROUP BLD BPU: NORMAL
BPU ID: NORMAL
CROSSMATCH: NORMAL
UNIT DISPENSE STATUS: NORMAL
UNIT PRODUCT CODE: NORMAL
UNIT PRODUCT VOLUME: 275 ML
UNIT RH: NORMAL

## 2025-03-11 ENCOUNTER — OFFICE VISIT (OUTPATIENT)
Dept: INTERNAL MEDICINE CLINIC | Facility: CLINIC | Age: 60
End: 2025-03-11
Payer: COMMERCIAL

## 2025-03-11 VITALS — WEIGHT: 105 LBS | BODY MASS INDEX: 19.2 KG/M2 | SYSTOLIC BLOOD PRESSURE: 130 MMHG | DIASTOLIC BLOOD PRESSURE: 62 MMHG

## 2025-03-11 DIAGNOSIS — D64.9 NORMOCYTIC ANEMIA: Primary | ICD-10-CM

## 2025-03-11 DIAGNOSIS — E46 PROTEIN-CALORIE MALNUTRITION, UNSPECIFIED SEVERITY (HCC): ICD-10-CM

## 2025-03-11 PROCEDURE — 99213 OFFICE O/P EST LOW 20 MIN: CPT | Performed by: INTERNAL MEDICINE

## 2025-03-11 NOTE — PROGRESS NOTES
Name: Danielle Haque      : 1965      MRN: [id number]  Encounter Provider: Eusebio Quiroz MD  Encounter Date: 3/11/2025   Encounter department: [facility]  :  Assessment & Plan  Normocytic anemia  -Suspect the cause of her anemia is multifactorial.  There is no evidence of iron deficiency on her most recent iron profile 1 month ago.  Suspect there is a component of anemia of chronic disease given her sarcoidosis.  Some of her anemia may be medication induced in the setting of treatment with methotrexate.  Plan to check a CBC today in addition to reticulocyte count, LDH and haptoglobin.  Patient has an appointment to follow-up with PCP in 2 weeks.  She was encouraged to keep her scheduled appointment.  Orders:  •  CBC and differential; Future  •  Reticulocytes; Future  •  LD,Blood; Future  •  Haptoglobin; Future    Protein-calorie malnutrition, unspecified severity (HCC)  -Discussed the importance of adequate nutrition.  A referral has been made to our nutrition group for evaluation and dietary recommendations.  Orders:  •  Ambulatory Referral to Nutrition Services; Future           History of Present Illness   HPI  Patient presents today for posthospital follow-up.  She was sent to the emergency department on 3/6 for symptomatic anemia.  Her red blood cell count at the time was 6.8.  She was given 1 unit of packed red blood cells and then discharged home.  She reports since receiving her transfusion she feels significantly better from an energy standpoint.  Her medical history is notable for rectal cancer status post resection with ostomy placement.  She denies any visible blood in her ostomy.  She recently underwent a scope within the past 1 to 2 months with her colorectal doctor she was noted to have a healthy end-to-end colorectal anastomosis in the rectum.    Review of Systems  All other systems negative except for pertinent findings noted in HPI.       Objective   BP  130/62   Wt 47.6 kg (105 lb)   BMI 19.20 kg/m²      Physical Exam  General: NAD  HEENT: NCAT, EOMI, normal conjunctiva  Cardiovascular: RRR, normal S1 and S2, VILMA  Pulmonary: Normal respiratory effort, no wheezes, rales or rhonchi  GI: Soft, nontender, ostomy in place over right abdomen  Extremities: No lower extremity edema  Skin: Normal skin color, no rashes   Psychiatric: Normal mood and affect

## 2025-03-13 ENCOUNTER — HOSPITAL ENCOUNTER (OUTPATIENT)
Dept: ULTRASOUND IMAGING | Facility: HOSPITAL | Age: 60
Discharge: HOME | End: 2025-03-13
Attending: UROLOGY
Payer: COMMERCIAL

## 2025-03-13 ENCOUNTER — TELEPHONE (OUTPATIENT)
Age: 60
End: 2025-03-13

## 2025-03-13 DIAGNOSIS — N13.39 OTHER HYDRONEPHROSIS: ICD-10-CM

## 2025-03-13 PROCEDURE — 76775 US EXAM ABDO BACK WALL LIM: CPT

## 2025-03-13 NOTE — TELEPHONE ENCOUNTER
Radiology Test Results - Radiology Calling with report update    Pt under care of:   Ordering Provider: Dr. Stephens     Imaging Completed: 3/13/25 u/s kidney and bladder    Significant Findings - Please review

## 2025-03-17 ENCOUNTER — TELEPHONE (OUTPATIENT)
Dept: UROLOGY | Facility: CLINIC | Age: 60
End: 2025-03-17

## 2025-03-17 DIAGNOSIS — N13.39 OTHER HYDRONEPHROSIS: Primary | ICD-10-CM

## 2025-03-17 NOTE — TELEPHONE ENCOUNTER
Spoke with patients significant other and advised of CT ordered. CS number provided and he will be going to schedule. Confirmed follow up

## 2025-03-17 NOTE — TELEPHONE ENCOUNTER
Please let the patient know her ultrasound demonstrated a possible recurrent stone within the left kidney.  I have ordered a CT for further evaluation.  Please help to schedule soon.  Thank you

## 2025-03-19 ENCOUNTER — OFFICE VISIT (OUTPATIENT)
Dept: NEPHROLOGY | Facility: CLINIC | Age: 60
End: 2025-03-19
Payer: COMMERCIAL

## 2025-03-19 VITALS
DIASTOLIC BLOOD PRESSURE: 50 MMHG | OXYGEN SATURATION: 96 % | BODY MASS INDEX: 20.3 KG/M2 | SYSTOLIC BLOOD PRESSURE: 100 MMHG | WEIGHT: 111 LBS | HEART RATE: 91 BPM

## 2025-03-19 DIAGNOSIS — C20 RECTAL CANCER (HCC): ICD-10-CM

## 2025-03-19 DIAGNOSIS — N20.0 NEPHROLITHIASIS: ICD-10-CM

## 2025-03-19 DIAGNOSIS — M89.9 CHRONIC KIDNEY DISEASE-MINERAL BONE DISORDER (CKD-MBD) WITH STAGE 3A CHRONIC KIDNEY DISEASE (HCC): ICD-10-CM

## 2025-03-19 DIAGNOSIS — D86.9 SARCOIDOSIS: ICD-10-CM

## 2025-03-19 DIAGNOSIS — E27.9 ADRENAL NODULE (HCC): ICD-10-CM

## 2025-03-19 DIAGNOSIS — D50.9 IRON DEFICIENCY ANEMIA, UNSPECIFIED IRON DEFICIENCY ANEMIA TYPE: ICD-10-CM

## 2025-03-19 DIAGNOSIS — E83.9 CHRONIC KIDNEY DISEASE-MINERAL BONE DISORDER (CKD-MBD) WITH STAGE 3A CHRONIC KIDNEY DISEASE (HCC): ICD-10-CM

## 2025-03-19 DIAGNOSIS — N18.31 CHRONIC KIDNEY DISEASE-MINERAL BONE DISORDER (CKD-MBD) WITH STAGE 3A CHRONIC KIDNEY DISEASE (HCC): ICD-10-CM

## 2025-03-19 DIAGNOSIS — N18.31 CKD STAGE G3A/A3, GFR 45-59 AND ALBUMIN CREATININE RATIO >300 MG/G (HCC): Primary | ICD-10-CM

## 2025-03-19 DIAGNOSIS — R80.1 PERSISTENT PROTEINURIA: ICD-10-CM

## 2025-03-19 DIAGNOSIS — N17.9 AKI (ACUTE KIDNEY INJURY) (HCC): ICD-10-CM

## 2025-03-19 PROCEDURE — 99214 OFFICE O/P EST MOD 30 MIN: CPT | Performed by: STUDENT IN AN ORGANIZED HEALTH CARE EDUCATION/TRAINING PROGRAM

## 2025-03-19 NOTE — ASSESSMENT & PLAN NOTE
Lab Results   Component Value Date    EGFR 38 03/06/2025    EGFR 41 02/13/2025    EGFR 23 02/05/2025    CREATININE 1.50 (H) 03/06/2025    CREATININE 1.42 (H) 02/13/2025    CREATININE 2.25 (H) 02/05/2025     Volume-: Euvolemic   Blood pressure: Normotensive, /62  Recommend low-sodium diet  Discontinue Lidopin as below  BP well-controlled, no evidence of hyperaldosteronism at this time   Advised to maintain a good BP control to prevent progression of CKD

## 2025-03-19 NOTE — ASSESSMENT & PLAN NOTE
Lab Results   Component Value Date    EGFR 38 03/06/2025    EGFR 41 02/13/2025    EGFR 23 02/05/2025    CREATININE 1.50 (H) 03/06/2025    CREATININE 1.42 (H) 02/13/2025    CREATININE 2.25 (H) 02/05/2025   Baseline creatinine 1-1.1 mg/dL,   Current creatinine above baseline  UA proteinuria, no hematuria  Etiology: Secondary to previous episodes of THOR, sarcoidosis, hypercalcemia.  Possible interstitial disease in the settings of sarcoidosis   Renal imaging : Left-sided hydronephrosis, resolved  Treatment:   Will monitor kidney function closely with BMP, UACR  Orders:    Basic metabolic panel; Future    Albumin / creatinine urine ratio; Future

## 2025-03-19 NOTE — ASSESSMENT & PLAN NOTE
Current hemoglobin:96.8mg/dL  Iron deficiency anemia  Status post blood transfusion  Continue iron supplements

## 2025-03-19 NOTE — ASSESSMENT & PLAN NOTE
UACR 929 mg/dL, improved to 64 mg/g.  Possible component of kidney involvement due to sarcoidosis   Off  lisinopril 5 mg, due to THOR

## 2025-03-19 NOTE — PATIENT INSTRUCTIONS
Thank you for coming to your visit today. As we discussed you kidney function is stable, your creatinine is 1.5 mg/dL.  Your electrolytes are normal. Please follow the recommendations below       Recommend low sodium (salt) food    Avoid nonsteroidal anti-inflammatory drugs such as Naprosyn, ibuprofen, Aleve, Advil, Celebrex, Meloxicam (Mobic) etc.  You can use Tylenol as needed if you do not have any liver condition to be concerned about    Try to avoid medications such as pantoprazole or  Protonix/Nexium or Esomeprazole)/Prilosec or omeprazole/Prevacid or lansoprazole/AcipHex or Rabeprazole.  If you are able to, use Pepcid as this is safer for your kidneys.    Try to exercise at least 30 minutes 3 days a week to begin with with an ultimate goal of 5 days a week for at least 30 minutes      Next Visit in 5 months with results   If you need to see us earlier we can change the appointment for you      Joselyn Reyes Bahamonde, MD  Nephrology Attending

## 2025-03-19 NOTE — TELEPHONE ENCOUNTER
Patient's relative called stating patient is to schedule a ct scan.  There is a ct scan scheduled for 04/16/25 . He will try to move it so she can keep her follow up appointment for 04/07/25.  He will call back .

## 2025-03-19 NOTE — ASSESSMENT & PLAN NOTE
Kidney stone with left-sided obstruction  Current nephrolithiasis possible sarcoidosis with hypercalcemia  now on MTX  Follow-up with urology

## 2025-03-19 NOTE — PROGRESS NOTES
Name: Danielle Haque      : 1965      MRN: [id number]  Encounter Provider: Joselyn Reyes Bahamonde, MD  Encounter Date: 3/19/2025   Encounter department: [facility] [place]  :  Assessment & Plan  THOR (acute kidney injury) (HCC)  #Non-Oliguric KDIGO THOR stage 1 on CKD G3a  Etiology: Likely secondary to obstructive uropathy with moderate left hydronephrosis  Current creatinine:1.5mg/dL  Peak creatinine:2.25  UA: Hematuria, leukocyturia, bacteriuria  Renal imaging : Left moderate hydronephrosis  Treatment:  Will contact urology to discuss regarding hydronephrosis and creatinine above baseline  No dialysis at this time  We need to monitor kidney function closely with BMP,  Avoid NSAIDs      #Acid-base Disorder  serum HCO3 20mmol/L  Low bicarbonate likely secondary to GI loss  Sodium bicarbonate for now           CKD stage G3a/A3, GFR 45-59 and albumin creatinine ratio >300 mg/g ([facility])  Lab Results   Component Value Date    EGFR 38 2025    EGFR 41 2025    EGFR 23 2025    CREATININE 1.50 (H) 2025    CREATININE 1.42 (H) 2025    CREATININE 2.25 (H) 2025   Baseline creatinine 1-1.1 mg/dL,   Current creatinine above baseline  UA proteinuria, no hematuria  Etiology: Secondary to previous episodes of THOR, sarcoidosis, hypercalcemia.  Possible interstitial disease in the settings of sarcoidosis   Renal imaging : Left-sided hydronephrosis, resolved  Treatment:   Will monitor kidney function closely with BMP, UACR  Orders:    Basic metabolic panel; Future    Albumin / creatinine urine ratio; Future    Sarcoidosis  Lung and hepatic involvement, elevated ACE level, NC granulomas on pleural biopsy and liver biopsy   patient was having prednisone and was discontinue in preparation for surgery however since then she has been in the  hospitalized with hypercalcemia and THOR   ACE limited to 124  Started on methotrexate  stage as steroid sparing agent due to surgery in  the near future  Follow-up with rheumatology       Persistent proteinuria  UACR 929 mg/dL, improved to 64 mg/g.  Possible component of kidney involvement due to sarcoidosis   Off  lisinopril 5 mg, due to THOR       Iron deficiency anemia, unspecified iron deficiency anemia type  Current hemoglobin:96.8mg/dL  Iron deficiency anemia  Status post blood transfusion  Continue iron supplements       Rectal cancer (HCC)  Status post diverting ileostomy  5-FU with RT  Follow-up with oncology  Was scheduled for ileostomy reversal but was canceled due to hypercalcemia   Patient is to be off prednisone for at least 6 weeks?        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 femalePMH cT3 cN0 cM0 rectal adenocarcinoma in the [facility] s/p diverting ileostomy, started on neoadjuvant CapeOx, complicated with reaction on the second cycle, treated with Xeloda,5-FU with RT , renal stricture with bilateral stent , sarcoidosis, on steroids.  Patient was admitted in August 2023 with SBO complicated with hypokalemia.  Patient is here for follow-up of CKD      History obtained from: patient    Review of Systems   Constitutional:  Negative for activity change and appetite change.   HENT:  Negative for congestion and dental  problem.    Eyes:  Negative for discharge.   Respiratory:  Negative for shortness of breath.    Cardiovascular:  Negative for chest pain.   Gastrointestinal:  Negative for abdominal distention and abdominal pain.   Genitourinary:  Negative for difficulty urinating.   Musculoskeletal:  Negative for arthralgias.   Skin:  Negative for color change and pallor.   Neurological:  Negative for dizziness.   Psychiatric/Behavioral:  Negative for agitation.      Pertinent Medical History           Medical History Reviewed by provider this encounter:     .  Current Outpatient Medications on File Prior to Visit   Medication Sig Dispense Refill    acetaminophen (TYLENOL) 325 mg tablet Take 2 tablets (650 mg total) by mouth every 4 (four) hours as needed for mild pain      Acetaminophen Extra Strength 500 MG TABS TAKE 1 TABLET (500 MG TOTAL) BY MOUTH EVERY 6 (SIX) HOURS AS NEEDED FOR MILD PAIN 120 tablet 0    clotrimazole (GYNE-LOTRIMIN) 1 % vaginal cream Insert 1 applicator into the vagina daily at bedtime 45 g 0    cyanocobalamin (VITAMIN B-12) 1000 MCG tablet Take 1,000 mcg by mouth daily      docusate sodium (COLACE) 100 mg capsule Take 1 capsule (100 mg total) by mouth 2 (two) times a day for 15 days 30 capsule 0    estrogens, conjugated (Premarin) vaginal cream Insert 0.3 g into the vagina 3 (three) times a week 30 g 6    fenofibrate (TRICOR) 145 mg tablet Take 1 tablet (145 mg total) by mouth daily 90 tablet 3    ferrous sulfate 324 (65 Fe) mg Take 1 tablet (324 mg total) by mouth daily before breakfast 30 tablet 5    folic acid (FOLVITE) 1 mg tablet Take 1 tablet (1 mg total) by mouth daily 30 tablet 5    hydrocortisone (ANUSOL-HC) 2.5 % rectal cream Apply topically 2 (two) times a day for 5 days 28 g 0    lidocaine (XYLOCAINE) 5 % ointment Apply topically as needed for mild pain 30 g 2    lisinopril (ZESTRIL) 5 mg tablet Take 1 tablet (5 mg total) by mouth daily (Patient not taking: Reported on 3/11/2025) 90 tablet 1     methotrexate 2.5 MG tablet Take 6 tablets (15 mg total) by mouth once a week 72 tablet 2    naproxen (NAPROSYN) 500 mg tablet Take 1 tablet (500 mg total) by mouth 2 (two) times a day with meals for 5 days For pain (Patient not taking: Reported on 3/11/2025) 10 tablet 0    ondansetron (ZOFRAN) 4 mg tablet Take 1 tablet (4 mg total) by mouth every 8 (eight) hours as needed for nausea or vomiting (Patient not taking: Reported on 1/15/2025) 20 tablet 2    oxybutynin (DITROPAN) 5 mg tablet Take 1 tablet (5 mg total) by mouth every 6 (six) hours as needed (bladder spasms) (Patient not taking: Reported on 3/11/2025) 30 tablet 0    oxyCODONE (Roxicodone) 5 immediate release tablet Take 1 tablet (5 mg total) by mouth every 8 (eight) hours as needed for moderate pain Max Daily Amount: 15 mg 90 tablet 0    tamsulosin (FLOMAX) 0.4 mg Take 1 capsule (0.4 mg total) by mouth daily with dinner 90 capsule 3    ursodiol (ACTIGALL) 300 mg capsule TAKE 3 CAPSULES (900 MG TOTAL) BY MOUTH IN THE MORNING 90 capsule 11     No current facility-administered medications on file prior to visit.      Social History     Tobacco Use    Smoking status: Never     Passive exposure: Past    Smokeless tobacco: Never   Vaping Use    Vaping status: Never Used   Substance and Sexual Activity    Alcohol use: Never    Drug use: Never    Sexual activity: Not Currently     Partners: Male     Birth control/protection: Abstinence, Post-menopausal        Objective   There were no vitals taken for this visit.     Physical Exam  General:  no acute distress at this time  Skin:  No acute rash  Eyes:  No scleral icterus and noninjected  ENT:  mucous membranes moist  Neck:  no carotid bruits  Chest:  Clear to auscultation percussion, good respiratory effort, no use of accessory respiratory muscles  CVS:  Regular rate and rhythm without rub   Abdomen:  soft and nontender   Extremities: no significant lower extremity edema  Neuro:  No gross focality  Psych:  Alert ,  cooperative

## 2025-03-19 NOTE — ASSESSMENT & PLAN NOTE
Lung and hepatic involvement, elevated ACE level, NC granulomas on pleural biopsy and liver biopsy   patient was having prednisone and was discontinue in preparation for surgery however since then she has been in the  hospitalized with hypercalcemia and THOR   ACE limited to 124  Started on methotrexate  stage as steroid sparing agent due to surgery in the near future  Follow-up with rheumatology

## 2025-03-21 NOTE — ASSESSMENT & PLAN NOTE
Lab Results   Component Value Date    EGFR 38 03/06/2025    EGFR 41 02/13/2025    EGFR 23 02/05/2025    CREATININE 1.50 (H) 03/06/2025    CREATININE 1.42 (H) 02/13/2025    CREATININE 2.25 (H) 02/05/2025   Seruma calcium 10mg/dL  Will check pth    Orders:    PTH, intact; Future    Phosphorus; Future

## 2025-03-21 NOTE — ASSESSMENT & PLAN NOTE
#Non-Oliguric KDIGO THOR stage 1 on CKD G3a  Etiology: Likely secondary to obstructive uropathy with moderate left hydronephrosis  Current creatinine:1.5mg/dL  Peak creatinine:2.25  UA: Hematuria, leukocyturia, bacteriuria  Renal imaging : Left moderate hydronephrosis  Treatment:  Will contact urology to discuss regarding hydronephrosis and creatinine above baseline  No dialysis at this time  We need to monitor kidney function closely with BMP,  Avoid NSAIDs      #Acid-base Disorder  serum HCO3 20mmol/L  Low bicarbonate likely secondary to GI loss  Sodium bicarbonate for now

## 2025-03-24 ENCOUNTER — PREP FOR PROCEDURE (OUTPATIENT)
Dept: UROLOGY | Facility: AMBULATORY SURGERY CENTER | Age: 60
End: 2025-03-24

## 2025-03-24 DIAGNOSIS — R39.89 SUSPECTED UTI: ICD-10-CM

## 2025-03-24 DIAGNOSIS — N13.39 OTHER HYDRONEPHROSIS: Primary | ICD-10-CM

## 2025-03-25 NOTE — ASSESSMENT & PLAN NOTE
Routine blood works:ordered  Vaccines: refused all vaccines;   Colon cancer screen:colonoscopy 1/2025, repeat in a year  Mammogram:reviewed last mammo from 7/2023, ordered mammo, right side breast us  Cervical Cancer screen:refer to gyn  Wt loss counseling:has referral to nutrition  DEXA scan: with steroid exposure it was ordered, not done, re-entered order  Healthy diet and regular exercise

## 2025-03-25 NOTE — PROGRESS NOTES
"Name: Danielle Haque      : 1965      MRN: [id number]  Encounter Provider: Leena Anaya MD  Encounter Date: 3/26/2025   [facility] department: [facility]  :  Assessment & Plan  Annual physical exam    Orders:    Mammo screening bilateral w 3d and cad; Future    US breast follow up right (no charge); Future    Lipid Panel With Direct LDL; Future    Hemoglobin A1C; Future    Encounter for gynecological examination without abnormal finding    Orders:    Ambulatory Referral to Obstetrics / Gynecology; Future    Osteoporosis, unspecified osteoporosis type, unspecified pathological fracture presence    Orders:    DXA bone density spine hip and pelvis; Future    Adrenal nodule (HCC)         Skin change  Reports subcutaneous lumps under left breast and left back sometimes hurt. Would like to have them removed. On exam they seem to be benign subcutaneous cyst. Non-urgent referral to plastic surgery.          Skin cyst    Orders:    Ambulatory Referral to Plastic Surgery; Future           History of Present Illness   HPI  Review of Systems    Objective   /62 (BP Location: Right arm, Patient Position: Sitting, Cuff Size: Standard)   Pulse 84   Ht 5' 3\" (1.6 m)   Wt 50.6 kg (111 lb 9.6 oz)   SpO2 96%   BMI 19.77 kg/m²      Physical Exam  Constitutional:       General: She is not in acute distress.     Appearance: She is well-developed.   HENT:      Head: Normocephalic and atraumatic.      Right Ear: External ear normal. There is impacted cerumen.      Left Ear: External ear normal.   Eyes:      General: No scleral icterus.     Conjunctiva/sclera: Conjunctivae normal.   Neck:      Thyroid: No thyromegaly.      Trachea: No tracheal deviation.   Cardiovascular:      Rate and Rhythm: Normal rate and regular rhythm.      Heart sounds: Normal heart sounds.   Pulmonary:      Effort: Pulmonary effort is normal. No respiratory distress.      Breath sounds: Normal breath sounds. No wheezing or " rales.   Abdominal:      General: Bowel sounds are normal.      Palpations: Abdomen is soft.      Tenderness: There is no abdominal tenderness. There is no guarding or rebound.   Musculoskeletal:      Cervical back: Normal range of motion and neck supple.   Lymphadenopathy:      Cervical: No cervical adenopathy.   Skin:     Comments: Small subcutaneous lump under left breast and left mid back   Neurological:      Mental Status: She is alert and oriented to person, place, and time.   Psychiatric:         Behavior: Behavior normal.         Thought Content: Thought content normal.         Judgment: Judgment normal.

## 2025-03-25 NOTE — ASSESSMENT & PLAN NOTE
Orders:    Mammo screening bilateral w 3d and cad; Future    US breast follow up right (no charge); Future    Lipid Panel With Direct LDL; Future    Hemoglobin A1C; Future

## 2025-03-26 ENCOUNTER — HOSPITAL ENCOUNTER (OUTPATIENT)
Dept: CT IMAGING | Facility: HOSPITAL | Age: 60
Discharge: HOME | End: 2025-03-26
Attending: UROLOGY
Payer: COMMERCIAL

## 2025-03-26 ENCOUNTER — OFFICE VISIT (OUTPATIENT)
Dept: INTERNAL MEDICINE CLINIC | Facility: CLINIC | Age: 60
End: 2025-03-26
Payer: COMMERCIAL

## 2025-03-26 ENCOUNTER — APPOINTMENT (OUTPATIENT)
Dept: LAB | Facility: AMBULARY SURGERY CENTER | Age: 60
End: 2025-03-26
Payer: COMMERCIAL

## 2025-03-26 VITALS
DIASTOLIC BLOOD PRESSURE: 62 MMHG | SYSTOLIC BLOOD PRESSURE: 112 MMHG | OXYGEN SATURATION: 96 % | WEIGHT: 111.6 LBS | HEART RATE: 84 BPM | BODY MASS INDEX: 19.77 KG/M2 | HEIGHT: 63 IN

## 2025-03-26 DIAGNOSIS — N18.31 CHRONIC KIDNEY DISEASE-MINERAL BONE DISORDER (CKD-MBD) WITH STAGE 3A CHRONIC KIDNEY DISEASE (HCC): ICD-10-CM

## 2025-03-26 DIAGNOSIS — Z00.00 ANNUAL PHYSICAL EXAM: Primary | ICD-10-CM

## 2025-03-26 DIAGNOSIS — D86.9 SARCOIDOSIS: ICD-10-CM

## 2025-03-26 DIAGNOSIS — M89.9 CHRONIC KIDNEY DISEASE-MINERAL BONE DISORDER (CKD-MBD) WITH STAGE 3A CHRONIC KIDNEY DISEASE (HCC): ICD-10-CM

## 2025-03-26 DIAGNOSIS — Z79.899 HIGH RISK MEDICATION USE: ICD-10-CM

## 2025-03-26 DIAGNOSIS — E27.9 ADRENAL NODULE (HCC): ICD-10-CM

## 2025-03-26 DIAGNOSIS — N13.39 OTHER HYDRONEPHROSIS: ICD-10-CM

## 2025-03-26 DIAGNOSIS — R39.89 SUSPECTED UTI: ICD-10-CM

## 2025-03-26 DIAGNOSIS — Z01.419 ENCOUNTER FOR GYNECOLOGICAL EXAMINATION WITHOUT ABNORMAL FINDING: ICD-10-CM

## 2025-03-26 DIAGNOSIS — L72.9 SKIN CYST: ICD-10-CM

## 2025-03-26 DIAGNOSIS — E83.9 CHRONIC KIDNEY DISEASE-MINERAL BONE DISORDER (CKD-MBD) WITH STAGE 3A CHRONIC KIDNEY DISEASE (HCC): ICD-10-CM

## 2025-03-26 DIAGNOSIS — M81.0 OSTEOPOROSIS, UNSPECIFIED OSTEOPOROSIS TYPE, UNSPECIFIED PATHOLOGICAL FRACTURE PRESENCE: ICD-10-CM

## 2025-03-26 DIAGNOSIS — D64.9 NORMOCYTIC ANEMIA: ICD-10-CM

## 2025-03-26 DIAGNOSIS — N18.31 CKD STAGE G3A/A3, GFR 45-59 AND ALBUMIN CREATININE RATIO >300 MG/G (HCC): ICD-10-CM

## 2025-03-26 DIAGNOSIS — R23.9 SKIN CHANGE: ICD-10-CM

## 2025-03-26 DIAGNOSIS — Z00.00 ANNUAL PHYSICAL EXAM: ICD-10-CM

## 2025-03-26 LAB
ALBUMIN SERPL BCG-MCNC: 4.4 G/DL (ref 3.5–5)
ALP SERPL-CCNC: 126 U/L (ref 34–104)
ALT SERPL W P-5'-P-CCNC: 30 U/L (ref 7–52)
ANION GAP SERPL CALCULATED.3IONS-SCNC: 7 MMOL/L (ref 4–13)
AST SERPL W P-5'-P-CCNC: 22 U/L (ref 13–39)
BASOPHILS # BLD AUTO: 0.02 THOUSANDS/ÂΜL (ref 0–0.1)
BASOPHILS NFR BLD AUTO: 1 % (ref 0–1)
BILIRUB SERPL-MCNC: 0.35 MG/DL (ref 0.2–1)
BUN SERPL-MCNC: 34 MG/DL (ref 5–25)
CALCIUM SERPL-MCNC: 10 MG/DL (ref 8.4–10.2)
CHLORIDE SERPL-SCNC: 103 MMOL/L (ref 96–108)
CHOLEST SERPL-MCNC: 183 MG/DL (ref ?–200)
CO2 SERPL-SCNC: 27 MMOL/L (ref 21–32)
CREAT SERPL-MCNC: 1.28 MG/DL (ref 0.6–1.3)
CRP SERPL QL: 7.9 MG/L
EOSINOPHIL # BLD AUTO: 0.19 THOUSAND/ÂΜL (ref 0–0.61)
EOSINOPHIL NFR BLD AUTO: 5 % (ref 0–6)
ERYTHROCYTE [DISTWIDTH] IN BLOOD BY AUTOMATED COUNT: 15 % (ref 11.6–15.1)
ERYTHROCYTE [SEDIMENTATION RATE] IN BLOOD: 37 MM/HOUR (ref 0–29)
EST. AVERAGE GLUCOSE BLD GHB EST-MCNC: 97 MG/DL
GFR SERPL CREATININE-BSD FRML MDRD: 46 ML/MIN/1.73SQ M
GLUCOSE P FAST SERPL-MCNC: 93 MG/DL (ref 65–99)
HBA1C MFR BLD: 5 %
HCT VFR BLD AUTO: 26.7 % (ref 34.8–46.1)
HDLC SERPL-MCNC: 68 MG/DL
HGB BLD-MCNC: 8.4 G/DL (ref 11.5–15.4)
IMM GRANULOCYTES # BLD AUTO: 0.01 THOUSAND/UL (ref 0–0.2)
IMM GRANULOCYTES NFR BLD AUTO: 0 % (ref 0–2)
LDH SERPL-CCNC: 98 U/L (ref 140–271)
LDLC SERPL CALC-MCNC: 77 MG/DL (ref 0–100)
LYMPHOCYTES # BLD AUTO: 0.31 THOUSANDS/ÂΜL (ref 0.6–4.47)
LYMPHOCYTES NFR BLD AUTO: 7 % (ref 14–44)
MCH RBC QN AUTO: 32.8 PG (ref 26.8–34.3)
MCHC RBC AUTO-ENTMCNC: 31.5 G/DL (ref 31.4–37.4)
MCV RBC AUTO: 104 FL (ref 82–98)
MONOCYTES # BLD AUTO: 0.29 THOUSAND/ÂΜL (ref 0.17–1.22)
MONOCYTES NFR BLD AUTO: 7 % (ref 4–12)
NEUTROPHILS # BLD AUTO: 3.35 THOUSANDS/ÂΜL (ref 1.85–7.62)
NEUTS SEG NFR BLD AUTO: 80 % (ref 43–75)
NRBC BLD AUTO-RTO: 0 /100 WBCS
PLATELET # BLD AUTO: 366 THOUSANDS/UL (ref 149–390)
PMV BLD AUTO: 10 FL (ref 8.9–12.7)
POTASSIUM SERPL-SCNC: 4.1 MMOL/L (ref 3.5–5.3)
PROT SERPL-MCNC: 7.4 G/DL (ref 6.4–8.4)
RBC # BLD AUTO: 2.56 MILLION/UL (ref 3.81–5.12)
RETICS # AUTO: ABNORMAL 10*3/UL (ref 14097–95744)
RETICS # CALC: 4 % (ref 0.37–1.87)
SODIUM SERPL-SCNC: 137 MMOL/L (ref 135–147)
TRIGL SERPL-MCNC: 192 MG/DL (ref ?–150)
WBC # BLD AUTO: 4.17 THOUSAND/UL (ref 4.31–10.16)

## 2025-03-26 PROCEDURE — 86140 C-REACTIVE PROTEIN: CPT

## 2025-03-26 PROCEDURE — 83036 HEMOGLOBIN GLYCOSYLATED A1C: CPT

## 2025-03-26 PROCEDURE — 87086 URINE CULTURE/COLONY COUNT: CPT

## 2025-03-26 PROCEDURE — 99396 PREV VISIT EST AGE 40-64: CPT | Performed by: GENERAL ACUTE CARE HOSPITAL

## 2025-03-26 PROCEDURE — 85045 AUTOMATED RETICULOCYTE COUNT: CPT

## 2025-03-26 PROCEDURE — 80061 LIPID PANEL: CPT

## 2025-03-26 PROCEDURE — 83615 LACTATE (LD) (LDH) ENZYME: CPT

## 2025-03-26 PROCEDURE — 36415 COLL VENOUS BLD VENIPUNCTURE: CPT

## 2025-03-26 PROCEDURE — 80053 COMPREHEN METABOLIC PANEL: CPT

## 2025-03-26 PROCEDURE — 85025 COMPLETE CBC W/AUTO DIFF WBC: CPT

## 2025-03-26 PROCEDURE — 74176 CT ABD & PELVIS W/O CONTRAST: CPT

## 2025-03-26 PROCEDURE — 85652 RBC SED RATE AUTOMATED: CPT

## 2025-03-26 PROCEDURE — 83010 ASSAY OF HAPTOGLOBIN QUANT: CPT

## 2025-03-26 NOTE — ASSESSMENT & PLAN NOTE
Reports subcutaneous lumps under left breast and left back sometimes hurt. Would like to have them removed. On exam they seem to be benign subcutaneous cyst. Non-urgent referral to plastic surgery.

## 2025-03-26 NOTE — PATIENT INSTRUCTIONS
Ordered mammogram and breast ultrasound  Ordered DEXA bone scan  Refer to gynecology for routine gyn exam  Get blood works done  Refer to plastic surgery to remove skin lesion (on your breast, back).

## 2025-03-27 LAB
BACTERIA UR CULT: NORMAL
HAPTOGLOB SERPL-MCNC: 83 MG/DL (ref 33–346)

## 2025-03-28 ENCOUNTER — ANESTHESIA EVENT (OUTPATIENT)
Dept: ANESTHESIOLOGY | Facility: HOSPITAL | Age: 60
End: 2025-03-28

## 2025-03-28 ENCOUNTER — APPOINTMENT (OUTPATIENT)
Dept: LAB | Facility: AMBULARY SURGERY CENTER | Age: 60
End: 2025-03-28
Payer: COMMERCIAL

## 2025-03-28 ENCOUNTER — ANESTHESIA (OUTPATIENT)
Dept: ANESTHESIOLOGY | Facility: HOSPITAL | Age: 60
End: 2025-03-28

## 2025-03-28 DIAGNOSIS — D35.01 ADENOMA OF RIGHT ADRENAL GLAND: ICD-10-CM

## 2025-03-28 LAB
CREAT 24H UR-MRATE: 1 G/24HR (ref 0.6–1.8)
SPECIMEN VOL UR: 2000 ML

## 2025-03-28 PROCEDURE — 83835 ASSAY OF METANEPHRINES: CPT

## 2025-03-28 PROCEDURE — 82570 ASSAY OF URINE CREATININE: CPT

## 2025-03-28 PROCEDURE — 82384 ASSAY THREE CATECHOLAMINES: CPT

## 2025-04-01 ENCOUNTER — RESULTS FOLLOW-UP (OUTPATIENT)
Dept: INTERNAL MEDICINE CLINIC | Facility: CLINIC | Age: 60
End: 2025-04-01

## 2025-04-01 ENCOUNTER — TELEPHONE (OUTPATIENT)
Dept: UROLOGY | Facility: CLINIC | Age: 60
End: 2025-04-01

## 2025-04-01 NOTE — TELEPHONE ENCOUNTER
I called pt to inform her of Dr. Stephens's previous note. I spoke with her  Mateo and informed him. He will make sure to inform pt and they can go over any questions with Ana Luisa next week at pt.'s pre op appt.

## 2025-04-01 NOTE — TELEPHONE ENCOUNTER
Please let Jackie know her recent CT demonstrates new swelling in her left kidney with a likely ureteral stone.    Please add left ureteroscopy with ureteral stent placement to the case request for her upcoming right ureteral stent exchange.  So final procedure will be:    Cystoscopy with right ureteral stent exchange, left ureteroscopy with holmium laser lithotripsy    Thank you

## 2025-04-02 ENCOUNTER — CONSULT (OUTPATIENT)
Age: 60
End: 2025-04-02
Payer: COMMERCIAL

## 2025-04-02 VITALS — WEIGHT: 111 LBS | HEIGHT: 63 IN | BODY MASS INDEX: 19.67 KG/M2

## 2025-04-02 DIAGNOSIS — L72.9 SKIN CYST: ICD-10-CM

## 2025-04-02 LAB
DOPAMINE 24H UR-MRATE: 140 UG/24 HR (ref 0–510)
DOPAMINE UR-MCNC: 70 UG/L
EPINEPH 24H UR-MRATE: <6 UG/24 HR (ref 0–20)
EPINEPH UR-MCNC: <3 UG/L
NOREPINEPH 24H UR-MRATE: <30 UG/24 HR (ref 0–135)
NOREPINEPH UR-MCNC: <15 UG/L

## 2025-04-02 PROCEDURE — 99243 OFF/OP CNSLTJ NEW/EST LOW 30: CPT | Performed by: PHYSICIAN ASSISTANT

## 2025-04-02 NOTE — PROGRESS NOTES
Consult - Plastic Surgery   Danielle Haque 59 y.o. female MRN: [id number]  Unit/Bed#:  Encounter: [id number]      Consults     Assessment:  Dermal inclusion cysts. Anterior neck, inferior left breast, left back/side.  Plan:  Excision, complex closure    All risks, benefits, and options, including but not limited to, pain, scarring, bleeding, infection, asymmetry, contour deformity, damage to adjacent nerves, vessels, hematoma, seroma, paresthesias, anesthesia (temporary versus permanent), skin necrosis,  wound dehiscence with need for healing by secondary intention and postoperative dressing changes,  recurrence, and need for revision and/or reoperation were fully explained to the patient.     Pt expressed understanding, would like to undergo the procedure, and signed the consent today.          HPI:   Danielle Haque is a 59 y.o. year old female who presents with history of painful subcutaneous cysts. She has noted 3 that are regularly painful, on her anterior neck, left breast, left back.    The cyst under her left breast has drained and has been drained in the past.    She does not smoke, or take anticoagulants.  She has a chemo port and an ostomy bag. She reports that her chemo has been completed.        REVIEW OF SYSTEMS    GENERAL/CONSTITUTIONAL: The patient denies fever, fatigue, weakness, weight gain or weight loss.  HEAD, EYES, EARS, NOSE AND THROAT: Eyes - The patient denies pain, redness, loss of vision, double or blurred vision and denies wearing glasses. The patient denies ringing in the ears, nosebleeds sinusitis, post nasal drip. Also denies frequent sore throats, hoarseness, painful swallowing.  CARDIOVASCULAR: The patient denies chest pain, irregular heartbeats, palpitations, shortness of breath, heart murmurs, high blood pressure, cramps in his legs with walking, pain in his feet or toes at night or varicose veins.  RESPIRATORY: The patient denies chronic cough, wheezing or night  sweats.  GASTROINTESTINAL: Hx ileostomy   GENITOURINARY: The patient denies difficult urination, pain or burning with urination, blood in the urine.  MUSCULOSKELETAL: The patient denies arm, thigh or calf cramps. No joint or muscle pain. No muscle weakness or tenderness. No joint swelling, neck pain, back pain or major orthopedic injuries.  SKIN AND BREASTS: The patient denies easy bruising, skin redness, skin rash, hives.  NEUROLOGIC: The patient denies headache, dizziness, fainting, memory loss.  PSYCHIATRIC: The patient denies depression anxiety.  ENDOCRINE: The patient denies intolerance to hot or cold temperature, flushing, fingernail changes, increased thirst, increased salt intake or decreased sexual desire.  HEMATOLOGIC/LYMPHATIC: The patient denies anemia, bleeding tendency or clotting tendency.  ALLERGIC/IMMUNOLOGIC: The patient denies rhinitis, asthma, skin sensitivity, latex allergies or sensitivity.      Historical Information   Past Medical History:   Diagnosis Date    Bleeding from colostomy stoma (HCC) 02/11/2023     states ileostomy , not colostomy    Cancer (HCC)     Colon cancer (HCC)     Elevated serum creatinine 09/11/2024    Fall     Headache     Hemochromatosis, unspecified     History of transfusion     2022 - no adverse reaction    Hyperlipidemia     Hypertension     Hypokalemia 09/12/2024    Kidney calculi     Kidney stone     Liver disease     hemangioma    Muscle weakness     Personal history of COVID-19 12/2022    Sarcoidosis     Seizures (HCC)     2022    Shingles      Past Surgical History:   Procedure Laterality Date    ABSCESS DRAINAGE      left breast    BREAST BIOPSY      CHEST TUBE INSERTION      COLON SURGERY      colostomy    COLONOSCOPY      FL GUIDED CENTRAL VENOUS ACCESS DEVICE INSERTION  03/21/2023    FL RETROGRADE PYELOGRAM  01/23/2023    FL RETROGRADE PYELOGRAM  04/03/2023    FL RETROGRADE PYELOGRAM  7/21/2023    FL RETROGRADE PYELOGRAM  10/11/2023    FL RETROGRADE  PYELOGRAM  12/15/2023    FL RETROGRADE PYELOGRAM  2/8/2024    FL RETROGRADE PYELOGRAM  5/10/2024    FL RETROGRADE PYELOGRAM  9/26/2024    FL RETROGRADE PYELOGRAM  10/18/2024    FL RETROGRADE PYELOGRAM  1/17/2025    IR BIOPSY LIVER MASS  05/09/2023    LUNG BIOPSY      WV CYSTO W/INSERT URETERAL STENT Bilateral 5/10/2024    Procedure: EXCHANGE STENT URETERAL;  Surgeon: José Luis Stephens MD;  [facility]: [facility];  Service: Urology    WV CYSTO W/INSERT URETERAL STENT Bilateral 10/18/2024    Procedure: EXCHANGE STENT URETERAL;  Surgeon: José Luis Stephens MD;  [facility];  Service: Urology    WV CYSTO/URETERO W/LITHOTRIPSY &INDWELL STENT INSRT Bilateral 01/23/2023    Procedure: CYSTOSCOPY  RIGHT URETEROSCOPY WITH LITHOTRIPSY HOLMIUM LASER, BILATERAL RETROGRADE PYELOGRAM AND BILATERAL  URETERAL STENT INSERTION;  Surgeon: José Luis Stephens MD;  [facility];  Service: Urology    WV CYSTO/URETERO W/LITHOTRIPSY &INDWELL STENT INSRT Right 04/03/2023    Procedure: RIGHT URETEROSCOPY WITH LITHOTRIPSY HOLMIUM LASER, RETROGRADE PYELOGRAM; BILATERAL EXCHANGE STENT URETERAL;  Surgeon: José Luis Stephens MD;  [facility];  Service: Urology    WV CYSTO/URETERO W/LITHOTRIPSY &INDWELL STENT INSRT Bilateral 10/11/2023    Procedure: CYSTOSCOPY URETEROSCOPY RETROGRADE PYELOGRAM AND INSERTION STENT URETERAL DIAGNOSTINC RIGHT URETEROSCOPY, REMOVAL STENT LEFT SIDE;  Surgeon: José Luis Stephens MD;  Location: [facility];  Service: Urology    WV CYSTO/URETERO W/LITHOTRIPSY &INDWELL STENT INSRT Bilateral 9/26/2024    Procedure: CYSTOSCOPY ,LEFT  URETEROSCOPY,  BILATERAL  RETROGRADE PYELOGRAM AND BILATERAL URETERAL STENT EXCHANGE;  Surgeon: Favian Barber MD;  [facility];  Service: Urology    WV CYSTO/URETERO W/LITHOTRIPSY &INDWELL STENT INSRT Left 10/18/2024    Procedure: CYSTOSCOPY LEFT URETEROSCOPY, treatment of LEFT ureteral stricture, RETROGRADE PYELOGRAM AND BILATERAL STENT  EXCHANGE;  Surgeon: José Luis Stephens MD;  [facility];  Service: Urology    DE CYSTOURETHROSCOPY W/URETERAL CATHETERIZATION Bilateral 07/21/2023    Procedure: CYSTOSCOPY URETEROSCOPY RETROGRADE PYELOGRAM WITH BILATERAL STENT URETERAL EXCHANGE; LEFT LASER LITHOTRIPSY AND STONE BASKET RETRIEVAL;  Surgeon: José Luis Stephens MD;  [facility];  Service: Urology    DE CYSTOURETHROSCOPY W/URETERAL CATHETERIZATION Bilateral 12/15/2023    Procedure: CYSTOSCOPY, BILATERAL  RETROGRADE PYELOGRAM , STENT EXCHANGE RIGHT , LEFT URETEROSCOPY ,  HOLMIUM LASER WITH INSERTION LEFT URETERAL STENT;  Surgeon: Derick Bhakta MD;  [facility];  Service: Urology    DE CYSTOURETHROSCOPY W/URETERAL CATHETERIZATION Left 2/8/2024    Procedure: CYSTOSCOPY B/L RETROGRADE PYELOGRAM WITH B/L T URETERALSTENT EXCHANGE,LEFT URETEROSCOPY, DILATION LEFT URETERAL STICTURE;  Surgeon: José Luis Stephens MD;  [facility];  Service: Urology    DE CYSTOURETHROSCOPY W/URETERAL CATHETERIZATION Bilateral 5/10/2024    Procedure: CYSTOSCOPY RETROGRADE PYELOGRAM WITH INSERTION STENT URETERAL;  Surgeon: José Luis Stephens MD;  [facility];  Service: Urology    DE CYSTOURETHROSCOPY W/URETERAL CATHETERIZATION Right 1/17/2025    Procedure: CYSTOSCOPY RETROGRADE PYELOGRAM WITH INSERTION STENT URETERAL, LEFT RETROGRADE PYELOGRAM AND STENT REMOVAL;  Surgeon: José Luis Stephens MD;  [facility];  Service: Urology    DE INSJ TUNNELED CTR VAD W/SUBQ PORT AGE 5 YR/> N/A 03/21/2023    Procedure: INSERTION VENOUS PORT ( PORT-A-CATH) IR;  Surgeon: Mark Amos DO;  Location: [facility];  Service: Interventional Radiology    DE LAPS COLECTOMY PRTL W/COLOPXTSTMY LW ANAST N/A 8/17/2023    Procedure: RESECTION COLON LOW ANTERIOR LAPAROSCOPIC WITH ROBOTICS;  Surgeon: Sree Umanzor MD;  [facility];  Service: Colorectal    TONSILLECTOMY      URINARY SURGERY Bilateral     bilateral  stents     Social History   Social History     Substance and Sexual Activity   Alcohol Use Never     Social History     Substance and Sexual Activity   Drug Use Never     Social History     Tobacco Use   Smoking Status Never    Passive exposure: Past   Smokeless Tobacco Never     Family History:   Family History   Problem Relation Age of Onset    Cancer Mother     Cirrhosis Father     Diabetes Father     Breast cancer Neg Hx     Breast cancer additional onset Neg Hx        Meds/Allergies     Current Outpatient Medications:     acetaminophen (TYLENOL) 325 mg tablet, Take 2 tablets (650 mg total) by mouth every 4 (four) hours as needed for mild pain, Disp: , Rfl:     Acetaminophen Extra Strength 500 MG TABS, TAKE 1 TABLET (500 MG TOTAL) BY MOUTH EVERY 6 (SIX) HOURS AS NEEDED FOR MILD PAIN, Disp: 120 tablet, Rfl: 0    clotrimazole (GYNE-LOTRIMIN) 1 % vaginal cream, Insert 1 applicator into the vagina daily at bedtime, Disp: 45 g, Rfl: 0    cyanocobalamin (VITAMIN B-12) 1000 MCG tablet, Take 1,000 mcg by mouth daily, Disp: , Rfl:     docusate sodium (COLACE) 100 mg capsule, Take 1 capsule (100 mg total) by mouth 2 (two) times a day for 15 days, Disp: 30 capsule, Rfl: 0    estrogens, conjugated (Premarin) vaginal cream, Insert 0.3 g into the vagina 3 (three) times a week, Disp: 30 g, Rfl: 6    fenofibrate (TRICOR) 145 mg tablet, Take 1 tablet (145 mg total) by mouth daily, Disp: 90 tablet, Rfl: 3    ferrous sulfate 324 (65 Fe) mg, Take 1 tablet (324 mg total) by mouth daily before breakfast, Disp: 30 tablet, Rfl: 5    folic acid (FOLVITE) 1 mg tablet, Take 1 tablet (1 mg total) by mouth daily, Disp: 30 tablet, Rfl: 5    hydrocortisone (ANUSOL-HC) 2.5 % rectal cream, Apply topically 2 (two) times a day for 5 days, Disp: 28 g, Rfl: 0    lidocaine (XYLOCAINE) 5 % ointment, Apply topically as needed for mild pain, Disp: 30 g, Rfl: 2    methotrexate 2.5 MG tablet, Take 6 tablets (15 mg total) by mouth once a week, Disp: 72  "tablet, Rfl: 2    ondansetron (ZOFRAN) 4 mg tablet, Take 1 tablet (4 mg total) by mouth every 8 (eight) hours as needed for nausea or vomiting (Patient not taking: Reported on 1/15/2025), Disp: 20 tablet, Rfl: 2    oxyCODONE (Roxicodone) 5 immediate release tablet, Take 1 tablet (5 mg total) by mouth every 8 (eight) hours as needed for moderate pain Max Daily Amount: 15 mg, Disp: 90 tablet, Rfl: 0    tamsulosin (FLOMAX) 0.4 mg, Take 1 capsule (0.4 mg total) by mouth daily with dinner, Disp: 90 capsule, Rfl: 3    ursodiol (ACTIGALL) 300 mg capsule, TAKE 3 CAPSULES (900 MG TOTAL) BY MOUTH IN THE MORNING, Disp: 90 capsule, Rfl: 11       Objective     General appearance: alert and oriented, in no acute distress  Head: Normocephalic, without obvious abnormality, atraumatic  Lungs: clear to auscultation bilaterally  Breasts: No nipple retraction or dimpling, Normal to palpation without dominant masses, dermal inclusion cyst of inferior left breast - see photo  Heart: regular rate and rhythm  Abdomen: normal findings: soft, non-tender and ostomy bag  Extremities: no edema, redness or tenderness in the calves or thighs  Lymph nodes: Cervical, supraclavicular, and axillary nodes normal.  Neurologic: Grossly normal    Lab Results:   Lab Results   Component Value Date    WBC 4.17 (L) 03/26/2025    HGB 8.4 (L) 03/26/2025    HCT 26.7 (L) 03/26/2025     (H) 03/26/2025     03/26/2025          No results found for: \"TISSUECULT\", \"WOUNDCULT\"      Imaging Studies:   No results found.    EKG, Pathology, and Other Studies:   Lab Results   Component Value Date/Time    FINALDX  06/03/2024 12:54 PM     A. Skin, left postauricular:    EPIDERMAL INCLUSION CYST accompanied by a keratin granuloma (I.e., foreign body giant cell reaction to keratinous debris)              [unfilled]    Invasive Devices       Central Venous Catheter Line  Duration             Port A Cath 03/21/23 Right Internal jugular 742 days              Drain  " Duration             Ureteral Internal Stent Left ureter 6 Fr. 620 days    Ureteral Internal Stent Right ureter 7 Fr. 620 days    Ileostomy Loop  days    Ureteral Internal Stent Right ureter 7 Fr. 538 days    Ureteral Internal Stent Left ureter 6 Fr. 187 days    Ureteral Internal Stent Right ureter 6 Fr. 187 days    Ureteral Internal Stent Right ureter 6 Fr. 74 days

## 2025-04-02 NOTE — PRE-PROCEDURE INSTRUCTIONS
Pre-Surgery Instructions:   Medication Instructions    acetaminophen (TYLENOL) 325 mg tablet Uses PRN- OK to take day of surgery    cyanocobalamin (VITAMIN B-12) 1000 MCG tablet Stop taking 7 days prior to surgery.    fenofibrate (TRICOR) 145 mg tablet Take day of surgery.    ferrous sulfate 324 (65 Fe) mg Hold day of surgery.    folic acid (FOLVITE) 1 mg tablet Stop taking 7 days prior to surgery.    methotrexate 2.5 MG tablet Take normal weekly schedule Wednesdays    oxyCODONE (Roxicodone) 5 immediate release tablet Uses PRN- OK to take day of surgery    tamsulosin (FLOMAX) 0.4 mg Take night before surgery    ursodiol (ACTIGALL) 300 mg capsule Take day of surgery.      Medication instructions for day of surgery reviewed with Mateo. Please take all instructed medications with only a sip of water.       You will receive a call one business day prior to surgery with an arrival time and hospital directions. If your surgery is scheduled on a Monday, the hospital will be calling you on the Friday prior to your surgery. If you have not heard from anyone by 8pm, please call the hospital supervisor through the hospital  at [phone]. ([place] [phone] or [place] [phone]).    Do not eat or drink anything after midnight the night before your surgery, including candy, mints, lifesavers, or chewing gum. Do not drink alcohol 24hrs before your surgery. Try not to smoke at least 24hrs before your surgery.       Follow the pre surgery showering instructions as listed in the “My Surgical Experience Booklet” or otherwise provided by your surgeon's office. Do not use a blade to shave the surgical area 1 week before surgery. It is okay to use a clean electric clippers up to 24 hours before surgery. Do not apply any lotions, creams, including makeup, cologne, deodorant, or perfumes after showering on the day of your surgery. Do not use dry shampoo, hair spray, hair gel, or any type of hair products.     No  contact lenses, eye make-up, or artificial eyelashes. Remove nail polish, including gel polish, and any artificial, gel, or acrylic nails if possible. Remove all jewelry including rings and body piercing jewelry.     Wear causal clothing that is easy to take on and off. Consider your type of surgery.    Keep any valuables, jewelry, piercings at home. Please bring any specially ordered equipment (sling, braces) if indicated.    Arrange for a responsible person to drive you to and from the hospital on the day of your surgery. Please confirm the visitor policy for the day of your procedure when you receive your phone call with an arrival time.     Call the surgeon's office with any new illnesses, exposures, or additional questions prior to surgery.    Please reference your “My Surgical Experience Booklet” for additional information to prepare for your upcoming surgery.

## 2025-04-02 NOTE — H&P (VIEW-ONLY)
Consult - Plastic Surgery   Danielle Haque 59 y.o. female MRN: [id number]  Unit/Bed#:  Encounter: [id number]      Consults     Assessment:  Dermal inclusion cysts. Anterior neck, inferior left breast, left back/side.  Plan:  Excision, complex closure    All risks, benefits, and options, including but not limited to, pain, scarring, bleeding, infection, asymmetry, contour deformity, damage to adjacent nerves, vessels, hematoma, seroma, paresthesias, anesthesia (temporary versus permanent), skin necrosis,  wound dehiscence with need for healing by secondary intention and postoperative dressing changes,  recurrence, and need for revision and/or reoperation were fully explained to the patient.     Pt expressed understanding, would like to undergo the procedure, and signed the consent today.          HPI:   Danielle Haque is a 59 y.o. year old female who presents with history of painful subcutaneous cysts. She has noted 3 that are regularly painful, on her anterior neck, left breast, left back.    The cyst under her left breast has drained and has been drained in the past.    She does not smoke, or take anticoagulants.  She has a chemo port and an ostomy bag. She reports that her chemo has been completed.        REVIEW OF SYSTEMS    GENERAL/CONSTITUTIONAL: The patient denies fever, fatigue, weakness, weight gain or weight loss.  HEAD, EYES, EARS, NOSE AND THROAT: Eyes - The patient denies pain, redness, loss of vision, double or blurred vision and denies wearing glasses. The patient denies ringing in the ears, nosebleeds sinusitis, post nasal drip. Also denies frequent sore throats, hoarseness, painful swallowing.  CARDIOVASCULAR: The patient denies chest pain, irregular heartbeats, palpitations, shortness of breath, heart murmurs, high blood pressure, cramps in his legs with walking, pain in his feet or toes at night or varicose veins.  RESPIRATORY: The patient denies chronic cough, wheezing or night  sweats.  GASTROINTESTINAL: Hx ileostomy   GENITOURINARY: The patient denies difficult urination, pain or burning with urination, blood in the urine.  MUSCULOSKELETAL: The patient denies arm, thigh or calf cramps. No joint or muscle pain. No muscle weakness or tenderness. No joint swelling, neck pain, back pain or major orthopedic injuries.  SKIN AND BREASTS: The patient denies easy bruising, skin redness, skin rash, hives.  NEUROLOGIC: The patient denies headache, dizziness, fainting, memory loss.  PSYCHIATRIC: The patient denies depression anxiety.  ENDOCRINE: The patient denies intolerance to hot or cold temperature, flushing, fingernail changes, increased thirst, increased salt intake or decreased sexual desire.  HEMATOLOGIC/LYMPHATIC: The patient denies anemia, bleeding tendency or clotting tendency.  ALLERGIC/IMMUNOLOGIC: The patient denies rhinitis, asthma, skin sensitivity, latex allergies or sensitivity.      Historical Information   Past Medical History:   Diagnosis Date    Bleeding from colostomy stoma (HCC) 02/11/2023     states ileostomy , not colostomy    Cancer (HCC)     Colon cancer (HCC)     Elevated serum creatinine 09/11/2024    Fall     Headache     Hemochromatosis, unspecified     History of transfusion     2022 - no adverse reaction    Hyperlipidemia     Hypertension     Hypokalemia 09/12/2024    Kidney calculi     Kidney stone     Liver disease     hemangioma    Muscle weakness     Personal history of COVID-19 12/2022    Sarcoidosis     Seizures (HCC)     2022    Shingles      Past Surgical History:   Procedure Laterality Date    ABSCESS DRAINAGE      left breast    BREAST BIOPSY      CHEST TUBE INSERTION      COLON SURGERY      colostomy    COLONOSCOPY      FL GUIDED CENTRAL VENOUS ACCESS DEVICE INSERTION  03/21/2023    FL RETROGRADE PYELOGRAM  01/23/2023    FL RETROGRADE PYELOGRAM  04/03/2023    FL RETROGRADE PYELOGRAM  7/21/2023    FL RETROGRADE PYELOGRAM  10/11/2023    FL RETROGRADE  PYELOGRAM  12/15/2023    FL RETROGRADE PYELOGRAM  2/8/2024    FL RETROGRADE PYELOGRAM  5/10/2024    FL RETROGRADE PYELOGRAM  9/26/2024    FL RETROGRADE PYELOGRAM  10/18/2024    FL RETROGRADE PYELOGRAM  1/17/2025    IR BIOPSY LIVER MASS  05/09/2023    LUNG BIOPSY      CO CYSTO W/INSERT URETERAL STENT Bilateral 5/10/2024    Procedure: EXCHANGE STENT URETERAL;  Surgeon: José Luis Stephens MD;  Location: [facility];  Service: Urology    CO CYSTO W/INSERT URETERAL STENT Bilateral 10/18/2024    Procedure: EXCHANGE STENT URETERAL;  Surgeon: José Luis Stephens MD;  [facility];  Service: Urology    CO CYSTO/URETERO W/LITHOTRIPSY &INDWELL STENT INSRT Bilateral 01/23/2023    Procedure: CYSTOSCOPY  RIGHT URETEROSCOPY WITH LITHOTRIPSY HOLMIUM LASER, BILATERAL RETROGRADE PYELOGRAM AND BILATERAL  URETERAL STENT INSERTION;  Surgeon: José Luis Stephens MD;  [facility];  Service: Urology    CO CYSTO/URETERO W/LITHOTRIPSY &INDWELL STENT INSRT Right 04/03/2023    Procedure: RIGHT URETEROSCOPY WITH LITHOTRIPSY HOLMIUM LASER, RETROGRADE PYELOGRAM; BILATERAL EXCHANGE STENT URETERAL;  Surgeon: José Luis Stephens MD;  [facility];  Service: Urology    CO CYSTO/URETERO W/LITHOTRIPSY &INDWELL STENT INSRT Bilateral 10/11/2023    Procedure: CYSTOSCOPY URETEROSCOPY RETROGRADE PYELOGRAM AND INSERTION STENT URETERAL DIAGNOSTINC RIGHT URETEROSCOPY, REMOVAL STENT LEFT SIDE;  Surgeon: José Luis Stephens MD;  Location: [facility];  Service: Urology    CO CYSTO/URETERO W/LITHOTRIPSY &INDWELL STENT INSRT Bilateral 9/26/2024    Procedure: CYSTOSCOPY ,LEFT  URETEROSCOPY,  BILATERAL  RETROGRADE PYELOGRAM AND BILATERAL URETERAL STENT EXCHANGE;  Surgeon: Favian Barber MD;  [facility];  Service: Urology    CO CYSTO/URETERO W/LITHOTRIPSY &INDWELL STENT INSRT Left 10/18/2024    Procedure: CYSTOSCOPY LEFT URETEROSCOPY, treatment of LEFT ureteral stricture, RETROGRADE PYELOGRAM AND BILATERAL STENT  EXCHANGE;  Surgeon: José Luis Stephens MD;  [facility];  Service: Urology    TX CYSTOURETHROSCOPY W/URETERAL CATHETERIZATION Bilateral 07/21/2023    Procedure: CYSTOSCOPY URETEROSCOPY RETROGRADE PYELOGRAM WITH BILATERAL STENT URETERAL EXCHANGE; LEFT LASER LITHOTRIPSY AND STONE BASKET RETRIEVAL;  Surgeon: José Luis Stephens MD;  Location: [facility];  Service: Urology    TX CYSTOURETHROSCOPY W/URETERAL CATHETERIZATION Bilateral 12/15/2023    Procedure: CYSTOSCOPY, BILATERAL  RETROGRADE PYELOGRAM , STENT EXCHANGE RIGHT , LEFT URETEROSCOPY ,  HOLMIUM LASER WITH INSERTION LEFT URETERAL STENT;  Surgeon: Derick Bhakta MD;  [facility];  Service: Urology    TX CYSTOURETHROSCOPY W/URETERAL CATHETERIZATION Left 2/8/2024    Procedure: CYSTOSCOPY B/L RETROGRADE PYELOGRAM WITH B/L T URETERALSTENT EXCHANGE,LEFT URETEROSCOPY, DILATION LEFT URETERAL STICTURE;  Surgeon: José Luis Stephens MD;  [facility];  Service: Urology    TX CYSTOURETHROSCOPY W/URETERAL CATHETERIZATION Bilateral 5/10/2024    Procedure: CYSTOSCOPY RETROGRADE PYELOGRAM WITH INSERTION STENT URETERAL;  Surgeon: José Luis Stephens MD;  [facility]: [facility];  Service: Urology    TX CYSTOURETHROSCOPY W/URETERAL CATHETERIZATION Right 1/17/2025    Procedure: CYSTOSCOPY RETROGRADE PYELOGRAM WITH INSERTION STENT URETERAL, LEFT RETROGRADE PYELOGRAM AND STENT REMOVAL;  Surgeon: José Luis Stephens MD;  [facility];  Service: Urology    TX INSJ TUNNELED CTR VAD W/SUBQ PORT AGE 5 YR/> N/A 03/21/2023    Procedure: INSERTION VENOUS PORT ( PORT-A-CATH) IR;  Surgeon: Mark Amos DO;  Location: [facility];  Service: Interventional Radiology    TX LAPS COLECTOMY PRTL W/COLOPXTSTMY LW ANAST N/A 8/17/2023    Procedure: RESECTION COLON LOW ANTERIOR LAPAROSCOPIC WITH ROBOTICS;  Surgeon: Sree Umanzor MD;  [facility];  Service: Colorectal    TONSILLECTOMY      URINARY SURGERY Bilateral     bilateral  stents     Social History   Social History     Substance and Sexual Activity   Alcohol Use Never     Social History     Substance and Sexual Activity   Drug Use Never     Social History     Tobacco Use   Smoking Status Never    Passive exposure: Past   Smokeless Tobacco Never     Family History:   Family History   Problem Relation Age of Onset    Cancer Mother     Cirrhosis Father     Diabetes Father     Breast cancer Neg Hx     Breast cancer additional onset Neg Hx        Meds/Allergies     Current Outpatient Medications:     acetaminophen (TYLENOL) 325 mg tablet, Take 2 tablets (650 mg total) by mouth every 4 (four) hours as needed for mild pain, Disp: , Rfl:     Acetaminophen Extra Strength 500 MG TABS, TAKE 1 TABLET (500 MG TOTAL) BY MOUTH EVERY 6 (SIX) HOURS AS NEEDED FOR MILD PAIN, Disp: 120 tablet, Rfl: 0    clotrimazole (GYNE-LOTRIMIN) 1 % vaginal cream, Insert 1 applicator into the vagina daily at bedtime, Disp: 45 g, Rfl: 0    cyanocobalamin (VITAMIN B-12) 1000 MCG tablet, Take 1,000 mcg by mouth daily, Disp: , Rfl:     docusate sodium (COLACE) 100 mg capsule, Take 1 capsule (100 mg total) by mouth 2 (two) times a day for 15 days, Disp: 30 capsule, Rfl: 0    estrogens, conjugated (Premarin) vaginal cream, Insert 0.3 g into the vagina 3 (three) times a week, Disp: 30 g, Rfl: 6    fenofibrate (TRICOR) 145 mg tablet, Take 1 tablet (145 mg total) by mouth daily, Disp: 90 tablet, Rfl: 3    ferrous sulfate 324 (65 Fe) mg, Take 1 tablet (324 mg total) by mouth daily before breakfast, Disp: 30 tablet, Rfl: 5    folic acid (FOLVITE) 1 mg tablet, Take 1 tablet (1 mg total) by mouth daily, Disp: 30 tablet, Rfl: 5    hydrocortisone (ANUSOL-HC) 2.5 % rectal cream, Apply topically 2 (two) times a day for 5 days, Disp: 28 g, Rfl: 0    lidocaine (XYLOCAINE) 5 % ointment, Apply topically as needed for mild pain, Disp: 30 g, Rfl: 2    methotrexate 2.5 MG tablet, Take 6 tablets (15 mg total) by mouth once a week, Disp: 72  "tablet, Rfl: 2    ondansetron (ZOFRAN) 4 mg tablet, Take 1 tablet (4 mg total) by mouth every 8 (eight) hours as needed for nausea or vomiting (Patient not taking: Reported on 1/15/2025), Disp: 20 tablet, Rfl: 2    oxyCODONE (Roxicodone) 5 immediate release tablet, Take 1 tablet (5 mg total) by mouth every 8 (eight) hours as needed for moderate pain Max Daily Amount: 15 mg, Disp: 90 tablet, Rfl: 0    tamsulosin (FLOMAX) 0.4 mg, Take 1 capsule (0.4 mg total) by mouth daily with dinner, Disp: 90 capsule, Rfl: 3    ursodiol (ACTIGALL) 300 mg capsule, TAKE 3 CAPSULES (900 MG TOTAL) BY MOUTH IN THE MORNING, Disp: 90 capsule, Rfl: 11       Objective     General appearance: alert and oriented, in no acute distress  Head: Normocephalic, without obvious abnormality, atraumatic  Lungs: clear to auscultation bilaterally  Breasts: No nipple retraction or dimpling, Normal to palpation without dominant masses, dermal inclusion cyst of inferior left breast - see photo  Heart: regular rate and rhythm  Abdomen: normal findings: soft, non-tender and ostomy bag  Extremities: no edema, redness or tenderness in the calves or thighs  Lymph nodes: Cervical, supraclavicular, and axillary nodes normal.  Neurologic: Grossly normal    Lab Results:   Lab Results   Component Value Date    WBC 4.17 (L) 03/26/2025    HGB 8.4 (L) 03/26/2025    HCT 26.7 (L) 03/26/2025     (H) 03/26/2025     03/26/2025          No results found for: \"TISSUECULT\", \"WOUNDCULT\"      Imaging Studies:   No results found.    EKG, Pathology, and Other Studies:   Lab Results   Component Value Date/Time    FINALDX  06/03/2024 12:54 PM     A. Skin, left postauricular:    EPIDERMAL INCLUSION CYST accompanied by a keratin granuloma (I.e., foreign body giant cell reaction to keratinous debris)              [unfilled]    Invasive Devices       Central Venous Catheter Line  Duration             Port A Cath 03/21/23 Right Internal jugular 742 days              Drain  " Duration             Ureteral Internal Stent Left ureter 6 Fr. 620 days    Ureteral Internal Stent Right ureter 7 Fr. 620 days    Ileostomy Loop  days    Ureteral Internal Stent Right ureter 7 Fr. 538 days    Ureteral Internal Stent Left ureter 6 Fr. 187 days    Ureteral Internal Stent Right ureter 6 Fr. 187 days    Ureteral Internal Stent Right ureter 6 Fr. 74 days

## 2025-04-03 ENCOUNTER — TELEPHONE (OUTPATIENT)
Dept: INFUSION CENTER | Facility: CLINIC | Age: 60
End: 2025-04-03

## 2025-04-03 ENCOUNTER — RESULTS FOLLOW-UP (OUTPATIENT)
Dept: ENDOCRINOLOGY | Facility: CLINIC | Age: 60
End: 2025-04-03

## 2025-04-03 NOTE — RESULT ENCOUNTER NOTE
Hello   24 hour urine Catecholamines and creatinine is george       Take care   Tomas Hubbard  
Depressed

## 2025-04-03 NOTE — TELEPHONE ENCOUNTER
Patient's relative called and stated patient is not feeling well and unable to make appointment scheduled today Thursday 4/3. Appointment has been rescheduled for Friday 4/4. Patient is aware and understanding of next scheduled appointment.

## 2025-04-04 ENCOUNTER — HOSPITAL ENCOUNTER (OUTPATIENT)
Dept: INFUSION CENTER | Facility: CLINIC | Age: 60
Discharge: HOME | End: 2025-04-04

## 2025-04-04 DIAGNOSIS — Z95.828 PORT-A-CATH IN PLACE: Primary | ICD-10-CM

## 2025-04-05 LAB
METANEPH 24H UR-MRATE: 76 UG/24 HR (ref 36–209)
METANEPHS 24H UR-MCNC: 38 UG/L
NORMETANEPHRINE 24H UR-MCNC: 136 UG/L
NORMETANEPHRINE 24H UR-MRATE: 272 UG/24 HR (ref 131–612)

## 2025-04-07 ENCOUNTER — OFFICE VISIT (OUTPATIENT)
Dept: UROLOGY | Facility: CLINIC | Age: 60
End: 2025-04-07
Payer: COMMERCIAL

## 2025-04-07 VITALS
SYSTOLIC BLOOD PRESSURE: 110 MMHG | WEIGHT: 109 LBS | BODY MASS INDEX: 19.31 KG/M2 | HEIGHT: 63 IN | DIASTOLIC BLOOD PRESSURE: 58 MMHG

## 2025-04-07 DIAGNOSIS — N20.1 URETERAL CALCULI: Primary | ICD-10-CM

## 2025-04-07 PROCEDURE — 99213 OFFICE O/P EST LOW 20 MIN: CPT

## 2025-04-07 NOTE — PROGRESS NOTES
Pre-op visit  4/7/2025      No chief complaint on file.        Assessment and Plan     59 y.o. female managed by Dr. 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 4 mm calculus left mid ureter, as well as a few other punctate 2 mm calculi in left distal ureter are present. Mild right hydronephrosis with right ureteral stent in place. There are a few punctate calculi adjacent to the proximal aspect of the stent.   Scattered semisolid nodules in both lung bases, that are stable and similar to prior CT.   -s/p left ureteral stent removal and right ureteral stent exchange with Dr. Stephens on 1/17/25.   -Patient is asymptomatic of flank pain, dysuria, frequency, urgency, gross hematuria. Denies fevers and chills.   -Urine culture negative (3/26/25).  -Creatinine 1.28, GFR 46 (3/26/25).  -Hgb 8.4, hct 26.7 (3/26/25)    History and physical was performed for the patients upcoming cystoscopy with right ureteral stent exchange, left ureteroscopy with holmium laser lithotripsy scheduled for 4/11/25 with Dr. Stephens. All questions and concerns regarding surgery and perioperative expectations have been addressed and answered. Surgical consent obtained today in the office. No overall changes in their health since last visit, denies any prior complications with anesthesia.  Will proceed with surgery as planned.          History of Present Illness  Danielle Haque is a 59 y.o. female here for history and physical prior to their upcoming surgery.    Urologic history as below:  -Complex nephrolithiasis and bilateral ureteral stricture disease  -Complex pelvic pain  -s/p left ureteral stent removal and right ureteral stent exchange with Dr. Stephens on 1/17/25.   -Most recent CT renal stone study (3/26/25) showing worsening left hydroureteronephrosis compared to prior CT  (12/20/24). 4 mm calculus left mid ureter, as well as a few other punctate 2 mm calculi in left distal ureter are present. Mild right hydronephrosis with right ureteral stent in place. There are a few punctate calculi adjacent to the proximal aspect of the stent.       Nonurologic hx:  -Advanced colorectal cancer, s/p low anterior resection (08/2023)  -HTN  -CKD Stage III        Review of Systems   Constitutional:  Negative for chills and fever.   HENT:  Negative for ear pain and sore throat.    Eyes:  Negative for pain and visual disturbance.   Respiratory:  Negative for cough and shortness of breath.    Cardiovascular:  Negative for chest pain and palpitations.   Gastrointestinal:  Negative for abdominal pain and vomiting.   Genitourinary:  Negative for difficulty urinating, dysuria, flank pain, frequency, hematuria and urgency.   Musculoskeletal:  Negative for arthralgias and back pain.   Skin:  Negative for color change and rash.   Neurological:  Negative for seizures and syncope.   All other systems reviewed and are negative.      Vitals  There were no vitals filed for this visit.    Physical Exam  Vitals and nursing note reviewed.   Constitutional:       General: She is not in acute distress.     Appearance: She is well-developed.   HENT:      Head: Normocephalic and atraumatic.   Eyes:      Conjunctiva/sclera: Conjunctivae normal.   Cardiovascular:      Rate and Rhythm: Normal rate and regular rhythm.      Heart sounds: No murmur heard.  Pulmonary:      Effort: Pulmonary effort is normal. No respiratory distress.      Breath sounds: Normal breath sounds.   Abdominal:      Palpations: Abdomen is soft.      Tenderness: There is no abdominal tenderness.   Musculoskeletal:         General: No swelling.      Cervical back: Neck supple.   Skin:     General: Skin is warm and dry.      Capillary Refill: Capillary refill takes less than 2 seconds.   Neurological:      Mental Status: She is alert.   Psychiatric:          Mood and Affect: Mood normal.             Past Medical History  Past Medical History:   Diagnosis Date    Bleeding from colostomy stoma (HCC) 02/11/2023     states ileostomy , not colostomy    Cancer (HCC)     Colon cancer (HCC)     Elevated serum creatinine 09/11/2024    Fall     Headache     Hemochromatosis, unspecified     History of transfusion     within 2 months 4/2/2025    Hyperlipidemia     Hypertension     Hypokalemia 09/12/2024    Kidney calculi     Kidney stone     Liver disease     hemangioma    Muscle weakness     Personal history of COVID-19 12/2022    Sarcoidosis     Seizures (HCC)     2022    Shingles        Past Social History  Past Surgical History:   Procedure Laterality Date    ABSCESS DRAINAGE      left breast    BREAST BIOPSY      CHEST TUBE INSERTION      COLON SURGERY      colostomy    COLONOSCOPY      FL GUIDED CENTRAL VENOUS ACCESS DEVICE INSERTION  03/21/2023    FL RETROGRADE PYELOGRAM  01/23/2023    FL RETROGRADE PYELOGRAM  04/03/2023    FL RETROGRADE PYELOGRAM  7/21/2023    FL RETROGRADE PYELOGRAM  10/11/2023    FL RETROGRADE PYELOGRAM  12/15/2023    FL RETROGRADE PYELOGRAM  2/8/2024    FL RETROGRADE PYELOGRAM  5/10/2024    FL RETROGRADE PYELOGRAM  9/26/2024    FL RETROGRADE PYELOGRAM  10/18/2024    FL RETROGRADE PYELOGRAM  1/17/2025    IR BIOPSY LIVER MASS  05/09/2023    LUNG BIOPSY      NE CYSTO W/INSERT URETERAL STENT Bilateral 5/10/2024    Procedure: EXCHANGE STENT URETERAL;  Surgeon: José Luis Stephens MD;  [facility];  Service: Urology    NE CYSTO W/INSERT URETERAL STENT Bilateral 10/18/2024    Procedure: EXCHANGE STENT URETERAL;  Surgeon: José Luis Stephens MD;  [facility];  Service: Urology    NE CYSTO/URETERO W/LITHOTRIPSY &INDWELL STENT INSRT Bilateral 01/23/2023    Procedure: CYSTOSCOPY  RIGHT URETEROSCOPY WITH LITHOTRIPSY HOLMIUM LASER, BILATERAL RETROGRADE PYELOGRAM AND BILATERAL  URETERAL STENT INSERTION;  Surgeon: José Luis Stephens  MD;  [facility];  Service: Urology    ME CYSTO/URETERO W/LITHOTRIPSY &INDWELL STENT INSRT Right 04/03/2023    Procedure: RIGHT URETEROSCOPY WITH LITHOTRIPSY HOLMIUM LASER, RETROGRADE PYELOGRAM; BILATERAL EXCHANGE STENT URETERAL;  Surgeon: José Luis Stephens MD;  [facility];  Service: Urology    ME CYSTO/URETERO W/LITHOTRIPSY &INDWELL STENT INSRT Bilateral 10/11/2023    Procedure: CYSTOSCOPY URETEROSCOPY RETROGRADE PYELOGRAM AND INSERTION STENT URETERAL DIAGNOSTINC RIGHT URETEROSCOPY, REMOVAL STENT LEFT SIDE;  Surgeon: José Luis Stephens MD;  [facility];  Service: Urology    ME CYSTO/URETERO W/LITHOTRIPSY &INDWELL STENT INSRT Bilateral 9/26/2024    Procedure: CYSTOSCOPY ,LEFT  URETEROSCOPY,  BILATERAL  RETROGRADE PYELOGRAM AND BILATERAL URETERAL STENT EXCHANGE;  Surgeon: Favian aBrber MD;  [facility];  Service: Urology    ME CYSTO/URETERO W/LITHOTRIPSY &INDWELL STENT INSRT Left 10/18/2024    Procedure: CYSTOSCOPY LEFT URETEROSCOPY, treatment of LEFT ureteral stricture, RETROGRADE PYELOGRAM AND BILATERAL STENT EXCHANGE;  Surgeon: José Luis Stephens MD;  [facility];  Service: Urology    ME CYSTOURETHROSCOPY W/URETERAL CATHETERIZATION Bilateral 07/21/2023    Procedure: CYSTOSCOPY URETEROSCOPY RETROGRADE PYELOGRAM WITH BILATERAL STENT URETERAL EXCHANGE; LEFT LASER LITHOTRIPSY AND STONE BASKET RETRIEVAL;  Surgeon: José Luis Stephens MD;  [facility];  Service: Urology    ME CYSTOURETHROSCOPY W/URETERAL CATHETERIZATION Bilateral 12/15/2023    Procedure: CYSTOSCOPY, BILATERAL  RETROGRADE PYELOGRAM , STENT EXCHANGE RIGHT , LEFT URETEROSCOPY ,  HOLMIUM LASER WITH INSERTION LEFT URETERAL STENT;  Surgeon: Derick Bhakta MD;  [facility];  Service: Urology    ME CYSTOURETHROSCOPY W/URETERAL CATHETERIZATION Left 2/8/2024    Procedure: CYSTOSCOPY B/L RETROGRADE PYELOGRAM WITH B/L T URETERALSTENT EXCHANGE,LEFT URETEROSCOPY, DILATION  LEFT URETERAL STICTURE;  Surgeon: José Luis Stephens MD;  [facility];  Service: Urology    CO CYSTOURETHROSCOPY W/URETERAL CATHETERIZATION Bilateral 5/10/2024    Procedure: CYSTOSCOPY RETROGRADE PYELOGRAM WITH INSERTION STENT URETERAL;  Surgeon: José Luis Stephens MD;  [facility];  Service: Urology    CO CYSTOURETHROSCOPY W/URETERAL CATHETERIZATION Right 1/17/2025    Procedure: CYSTOSCOPY RETROGRADE PYELOGRAM WITH INSERTION STENT URETERAL, LEFT RETROGRADE PYELOGRAM AND STENT REMOVAL;  Surgeon: José Luis Stephens MD;  [facility];  Service: Urology    CO INSJ TUNNELED CTR VAD W/SUBQ PORT AGE 5 YR/> N/A 03/21/2023    Procedure: INSERTION VENOUS PORT ( PORT-A-CATH) IR;  Surgeon: Mark Amos DO;  Location: [facility];  Service: Interventional Radiology    CO LAPS COLECTOMY PRTL W/COLOPXTSTMY LW ANAST N/A 8/17/2023    Procedure: RESECTION COLON LOW ANTERIOR LAPAROSCOPIC WITH ROBOTICS;  Surgeon: Sree Umanzor MD;  [facility];  Service: Colorectal    TONSILLECTOMY      URINARY SURGERY Bilateral     bilateral stents       Past Family History  Family History   Problem Relation Age of Onset    Cancer Mother     Cirrhosis Father     Diabetes Father     Breast cancer Neg Hx     Breast cancer additional onset Neg Hx        Past Social history  Social History     Socioeconomic History    Marital status:      Spouse name: Not on file    Number of children: Not on file    Years of education: Not on file    Highest education level: Not on file   Occupational History    Not on file   Tobacco Use    Smoking status: Never     Passive exposure: Past    Smokeless tobacco: Never   Vaping Use    Vaping status: Never Used   Substance and Sexual Activity    Alcohol use: Never    Drug use: Never    Sexual activity: Not Currently     Partners: Male     Birth control/protection: Abstinence, Post-menopausal   Other Topics Concern    Not on file   Social History Narrative     From 4/14/23  note:    Emergency Contact: MATEO TREVIZO (ex-) [phone] (Mobile)    Marital Status:     Caregiver/Support: ex- -Mateo and two dtrs.     Children: two dtrs- nataliia 21 in [place] and doretha 18 [facility]    Child/Elder care: no     Housing: two story house    Home Setup: one level    Lives With:  -mateo and two dtrs    Daily Living Activities: independent    Durable Medical Equipment: No    Ambulation: independent    Employment: disabled-SSI-$100 and pension-$700     Status/Location: no     Ability to pay bills: yes. No barriers to paying monthly bills.      POA/LW/AD: no.  - Mateo is healthcare representative.  MSW emailed advance directive.          Social Drivers of Health     Financial Resource Strain: Low Risk  (11/30/2022)    Received from [employer]    Financial Resource Strain     In the last 12 months, did you worry that your food could run out before you got money to buy more? : No   Food Insecurity: No Food Insecurity (12/20/2024)    Nursing - Inadequate Food Risk Classification     Worried About Running Out of Food in the Last Year: Never true     Ran Out of Food in the Last Year: Never true     Ran Out of Food in the Last Year: Never true   Transportation Needs: No Transportation Needs (12/20/2024)    Nursing - Transportation Risk Classification     Lack of Transportation: Not on file     Lack of Transportation: No   Physical Activity: Not on file   Stress: Not on file   Social Connections: Not on file   Intimate Partner Violence: Unknown (12/20/2024)    Nursing IPS     Feels Physically and Emotionally Safe: Not on file     Physically Hurt by Someone: Not on file     Humiliated or Emotionally Abused by Someone: Not on file     Physically Hurt by Someone: No     Hurt or Threatened by Someone: No   Housing Stability: Unknown (12/20/2024)    Nursing: Inadequate Housing Risk  Classification     Has Housing: Not on file     Worried About Losing Housing: Not on file     Unable to Get Utilities: Not on file     Unable to Pay for Housing in the Last Year: No     Has Housin       Current Medications  Current Outpatient Medications   Medication Sig Dispense Refill    acetaminophen (TYLENOL) 325 mg tablet Take 2 tablets (650 mg total) by mouth every 4 (four) hours as needed for mild pain      Acetaminophen Extra Strength 500 MG TABS TAKE 1 TABLET (500 MG TOTAL) BY MOUTH EVERY 6 (SIX) HOURS AS NEEDED FOR MILD PAIN 120 tablet 0    clotrimazole (GYNE-LOTRIMIN) 1 % vaginal cream Insert 1 applicator into the vagina daily at bedtime 45 g 0    cyanocobalamin (VITAMIN B-12) 1000 MCG tablet Take 1,000 mcg by mouth daily      docusate sodium (COLACE) 100 mg capsule Take 1 capsule (100 mg total) by mouth 2 (two) times a day for 15 days 30 capsule 0    estrogens, conjugated (Premarin) vaginal cream Insert 0.3 g into the vagina 3 (three) times a week 30 g 6    fenofibrate (TRICOR) 145 mg tablet Take 1 tablet (145 mg total) by mouth daily 90 tablet 3    ferrous sulfate 324 (65 Fe) mg Take 1 tablet (324 mg total) by mouth daily before breakfast 30 tablet 5    folic acid (FOLVITE) 1 mg tablet Take 1 tablet (1 mg total) by mouth daily 30 tablet 5    hydrocortisone (ANUSOL-HC) 2.5 % rectal cream Apply topically 2 (two) times a day for 5 days 28 g 0    lidocaine (XYLOCAINE) 5 % ointment Apply topically as needed for mild pain 30 g 2    methotrexate 2.5 MG tablet Take 6 tablets (15 mg total) by mouth once a week 72 tablet 2    ondansetron (ZOFRAN) 4 mg tablet Take 1 tablet (4 mg total) by mouth every 8 (eight) hours as needed for nausea or vomiting (Patient not taking: Reported on 1/15/2025) 20 tablet 2    oxyCODONE (Roxicodone) 5 immediate release tablet Take 1 tablet (5 mg total) by mouth every 8 (eight) hours as needed for moderate pain Max Daily Amount: 15 mg 90 tablet 0    tamsulosin (FLOMAX) 0.4 mg Take  "1 capsule (0.4 mg total) by mouth daily with dinner 90 capsule 3    ursodiol (ACTIGALL) 300 mg capsule TAKE 3 CAPSULES (900 MG TOTAL) BY MOUTH IN THE MORNING 90 capsule 11     No current facility-administered medications for this visit.       Allergies  Allergies   Allergen Reactions    Oxaliplatin Shortness Of Breath     Reactions occurred with 2nd and 3rd infusions (about 1-3 hours from initiation of infusion) and required treatment with steroids and antihistamines. Please refer to allergy note on 2/7/2023 for detailed description of her reactions.    Morphine Nausea Only     \"Every dose made me nauseous\" (oral dosing only, can tolerate IV morphine)    Potassium Chloride Other (See Comments)     Pt reports \"burning\" with Iv potassium administration and wishes for it to be added to chart         Past Medical History, Social History, Family History, medications and allergies were reviewed.   "

## 2025-04-07 NOTE — H&P (VIEW-ONLY)
Pre-op visit  4/7/2025      No chief complaint on file.        Assessment and Plan     59 y.o. female managed by Dr. 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 4 mm calculus left mid ureter, as well as a few other punctate 2 mm calculi in left distal ureter are present. Mild right hydronephrosis with right ureteral stent in place. There are a few punctate calculi adjacent to the proximal aspect of the stent.   Scattered semisolid nodules in both lung bases, that are stable and similar to prior CT.   -s/p left ureteral stent removal and right ureteral stent exchange with Dr. Stephens on 1/17/25.   -Patient is asymptomatic of flank pain, dysuria, frequency, urgency, gross hematuria. Denies fevers and chills.   -Urine culture negative (3/26/25).  -Creatinine 1.28, GFR 46 (3/26/25).  -Hgb 8.4, hct 26.7 (3/26/25)    History and physical was performed for the patients upcoming cystoscopy with right ureteral stent exchange, left ureteroscopy with holmium laser lithotripsy scheduled for 4/11/25 with Dr. Stephens. All questions and concerns regarding surgery and perioperative expectations have been addressed and answered. Surgical consent obtained today in the office. No overall changes in their health since last visit, denies any prior complications with anesthesia.  Will proceed with surgery as planned.          History of Present Illness  Danielle Haque is a 59 y.o. female here for history and physical prior to their upcoming surgery.    Urologic history as below:  -Complex nephrolithiasis and bilateral ureteral stricture disease  -Complex pelvic pain  -s/p left ureteral stent removal and right ureteral stent exchange with Dr. Stephens on 1/17/25.   -Most recent CT renal stone study (3/26/25) showing worsening left hydroureteronephrosis compared to prior CT  (12/20/24). 4 mm calculus left mid ureter, as well as a few other punctate 2 mm calculi in left distal ureter are present. Mild right hydronephrosis with right ureteral stent in place. There are a few punctate calculi adjacent to the proximal aspect of the stent.       Nonurologic hx:  -Advanced colorectal cancer, s/p low anterior resection (08/2023)  -HTN  -CKD Stage III        Review of Systems   Constitutional:  Negative for chills and fever.   HENT:  Negative for ear pain and sore throat.    Eyes:  Negative for pain and visual disturbance.   Respiratory:  Negative for cough and shortness of breath.    Cardiovascular:  Negative for chest pain and palpitations.   Gastrointestinal:  Negative for abdominal pain and vomiting.   Genitourinary:  Negative for difficulty urinating, dysuria, flank pain, frequency, hematuria and urgency.   Musculoskeletal:  Negative for arthralgias and back pain.   Skin:  Negative for color change and rash.   Neurological:  Negative for seizures and syncope.   All other systems reviewed and are negative.      Vitals  There were no vitals filed for this visit.    Physical Exam  Vitals and nursing note reviewed.   Constitutional:       General: She is not in acute distress.     Appearance: She is well-developed.   HENT:      Head: Normocephalic and atraumatic.   Eyes:      Conjunctiva/sclera: Conjunctivae normal.   Cardiovascular:      Rate and Rhythm: Normal rate and regular rhythm.      Heart sounds: No murmur heard.  Pulmonary:      Effort: Pulmonary effort is normal. No respiratory distress.      Breath sounds: Normal breath sounds.   Abdominal:      Palpations: Abdomen is soft.      Tenderness: There is no abdominal tenderness.   Musculoskeletal:         General: No swelling.      Cervical back: Neck supple.   Skin:     General: Skin is warm and dry.      Capillary Refill: Capillary refill takes less than 2 seconds.   Neurological:      Mental Status: She is alert.   Psychiatric:          Mood and Affect: Mood normal.             Past Medical History  Past Medical History:   Diagnosis Date    Bleeding from colostomy stoma (HCC) 02/11/2023     states ileostomy , not colostomy    Cancer (HCC)     Colon cancer (HCC)     Elevated serum creatinine 09/11/2024    Fall     Headache     Hemochromatosis, unspecified     History of transfusion     within 2 months 4/2/2025    Hyperlipidemia     Hypertension     Hypokalemia 09/12/2024    Kidney calculi     Kidney stone     Liver disease     hemangioma    Muscle weakness     Personal history of COVID-19 12/2022    Sarcoidosis     Seizures (HCC)     2022    Shingles        Past Social History  Past Surgical History:   Procedure Laterality Date    ABSCESS DRAINAGE      left breast    BREAST BIOPSY      CHEST TUBE INSERTION      COLON SURGERY      colostomy    COLONOSCOPY      FL GUIDED CENTRAL VENOUS ACCESS DEVICE INSERTION  03/21/2023    FL RETROGRADE PYELOGRAM  01/23/2023    FL RETROGRADE PYELOGRAM  04/03/2023    FL RETROGRADE PYELOGRAM  7/21/2023    FL RETROGRADE PYELOGRAM  10/11/2023    FL RETROGRADE PYELOGRAM  12/15/2023    FL RETROGRADE PYELOGRAM  2/8/2024    FL RETROGRADE PYELOGRAM  5/10/2024    FL RETROGRADE PYELOGRAM  9/26/2024    FL RETROGRADE PYELOGRAM  10/18/2024    FL RETROGRADE PYELOGRAM  1/17/2025    IR BIOPSY LIVER MASS  05/09/2023    LUNG BIOPSY      WI CYSTO W/INSERT URETERAL STENT Bilateral 5/10/2024    Procedure: EXCHANGE STENT URETERAL;  Surgeon: José Luis Stephens MD;  [facility];  Service: Urology    WI CYSTO W/INSERT URETERAL STENT Bilateral 10/18/2024    Procedure: EXCHANGE STENT URETERAL;  Surgeon: José Luis Stephens MD;  [facility];  Service: Urology    WI CYSTO/URETERO W/LITHOTRIPSY &INDWELL STENT INSRT Bilateral 01/23/2023    Procedure: CYSTOSCOPY  RIGHT URETEROSCOPY WITH LITHOTRIPSY HOLMIUM LASER, BILATERAL RETROGRADE PYELOGRAM AND BILATERAL  URETERAL STENT INSERTION;  Surgeon: José Luis Stephens  MD;  [facility];  Service: Urology    ID CYSTO/URETERO W/LITHOTRIPSY &INDWELL STENT INSRT Right 04/03/2023    Procedure: RIGHT URETEROSCOPY WITH LITHOTRIPSY HOLMIUM LASER, RETROGRADE PYELOGRAM; BILATERAL EXCHANGE STENT URETERAL;  Surgeon: José Luis Stephens MD;  [facility];  Service: Urology    ID CYSTO/URETERO W/LITHOTRIPSY &INDWELL STENT INSRT Bilateral 10/11/2023    Procedure: CYSTOSCOPY URETEROSCOPY RETROGRADE PYELOGRAM AND INSERTION STENT URETERAL DIAGNOSTINC RIGHT URETEROSCOPY, REMOVAL STENT LEFT SIDE;  Surgeon: José Luis Stephens MD;  [facility];  Service: Urology    ID CYSTO/URETERO W/LITHOTRIPSY &INDWELL STENT INSRT Bilateral 9/26/2024    Procedure: CYSTOSCOPY ,LEFT  URETEROSCOPY,  BILATERAL  RETROGRADE PYELOGRAM AND BILATERAL URETERAL STENT EXCHANGE;  Surgeon: Favian Barber MD;  [facility];  Service: Urology    ID CYSTO/URETERO W/LITHOTRIPSY &INDWELL STENT INSRT Left 10/18/2024    Procedure: CYSTOSCOPY LEFT URETEROSCOPY, treatment of LEFT ureteral stricture, RETROGRADE PYELOGRAM AND BILATERAL STENT EXCHANGE;  Surgeon: José Luis Stephens MD;  [facility];  Service: Urology    ID CYSTOURETHROSCOPY W/URETERAL CATHETERIZATION Bilateral 07/21/2023    Procedure: CYSTOSCOPY URETEROSCOPY RETROGRADE PYELOGRAM WITH BILATERAL STENT URETERAL EXCHANGE; LEFT LASER LITHOTRIPSY AND STONE BASKET RETRIEVAL;  Surgeon: José Luis Stephens MD;  [facility];  Service: Urology    ID CYSTOURETHROSCOPY W/URETERAL CATHETERIZATION Bilateral 12/15/2023    Procedure: CYSTOSCOPY, BILATERAL  RETROGRADE PYELOGRAM , STENT EXCHANGE RIGHT , LEFT URETEROSCOPY ,  HOLMIUM LASER WITH INSERTION LEFT URETERAL STENT;  Surgeon: Derick Bhakta MD;  [facility];  Service: Urology    ID CYSTOURETHROSCOPY W/URETERAL CATHETERIZATION Left 2/8/2024    Procedure: CYSTOSCOPY B/L RETROGRADE PYELOGRAM WITH B/L T URETERALSTENT EXCHANGE,LEFT URETEROSCOPY, DILATION  LEFT URETERAL STICTURE;  Surgeon: José Luis Stephens MD;  [facility];  Service: Urology    AR CYSTOURETHROSCOPY W/URETERAL CATHETERIZATION Bilateral 5/10/2024    Procedure: CYSTOSCOPY RETROGRADE PYELOGRAM WITH INSERTION STENT URETERAL;  Surgeon: José Luis Stephens MD;  [facility];  Service: Urology    AR CYSTOURETHROSCOPY W/URETERAL CATHETERIZATION Right 1/17/2025    Procedure: CYSTOSCOPY RETROGRADE PYELOGRAM WITH INSERTION STENT URETERAL, LEFT RETROGRADE PYELOGRAM AND STENT REMOVAL;  Surgeon: José Luis Stephens MD;  [facility];  Service: Urology    AR INSJ TUNNELED CTR VAD W/SUBQ PORT AGE 5 YR/> N/A 03/21/2023    Procedure: INSERTION VENOUS PORT ( PORT-A-CATH) IR;  Surgeon: Mark Amos DO;  [facility]: [facility];  Service: Interventional Radiology    AR LAPS COLECTOMY PRTL W/COLOPXTSTMY LW ANAST N/A 8/17/2023    Procedure: RESECTION COLON LOW ANTERIOR LAPAROSCOPIC WITH ROBOTICS;  Surgeon: Sree Umanzor MD;  [facility];  Service: Colorectal    TONSILLECTOMY      URINARY SURGERY Bilateral     bilateral stents       Past Family History  Family History   Problem Relation Age of Onset    Cancer Mother     Cirrhosis Father     Diabetes Father     Breast cancer Neg Hx     Breast cancer additional onset Neg Hx        Past Social history  Social History     Socioeconomic History    Marital status:      Spouse name: Not on file    Number of children: Not on file    Years of education: Not on file    Highest education level: Not on file   Occupational History    Not on file   Tobacco Use    Smoking status: Never     Passive exposure: Past    Smokeless tobacco: Never   Vaping Use    Vaping status: Never Used   Substance and Sexual Activity    Alcohol use: Never    Drug use: Never    Sexual activity: Not Currently     Partners: Male     Birth control/protection: Abstinence, Post-menopausal   Other Topics Concern    Not on file   Social History Narrative     From 4/14/23  note:    Emergency Contact: MATEO TREVIZO (ex-) [phone] (Mobile)    Marital Status:     Caregiver/Support: ex- -Mateo and two dtrs.     Children: two dtrs- nataliia 21 in [place] and doretha 18 [facility]    Child/Elder care: no     Housing: two story house    Home Setup: one level    Lives With:  -mateo and two dtrs    Daily Living Activities: independent    Durable Medical Equipment: No    Ambulation: independent    Employment: disabled-SSI-$100 and pension-$700     Status/Location: no     Ability to pay bills: yes. No barriers to paying monthly bills.      POA/LW/AD: no.  - Mateo is healthcare representative.  MSW emailed advance directive.          Social Drivers of Health     Financial Resource Strain: Low Risk  (11/30/2022)    Received from [employer]    Financial Resource Strain     In the last 12 months, did you worry that your food could run out before you got money to buy more? : No   Food Insecurity: No Food Insecurity (12/20/2024)    Nursing - Inadequate Food Risk Classification     Worried About Running Out of Food in the Last Year: Never true     Ran Out of Food in the Last Year: Never true     Ran Out of Food in the Last Year: Never true   Transportation Needs: No Transportation Needs (12/20/2024)    Nursing - Transportation Risk Classification     Lack of Transportation: Not on file     Lack of Transportation: No   Physical Activity: Not on file   Stress: Not on file   Social Connections: Not on file   Intimate Partner Violence: Unknown (12/20/2024)    Nursing IPS     Feels Physically and Emotionally Safe: Not on file     Physically Hurt by Someone: Not on file     Humiliated or Emotionally Abused by Someone: Not on file     Physically Hurt by Someone: No     Hurt or Threatened by Someone: No   Housing Stability: Unknown (12/20/2024)    Nursing: Inadequate Housing Risk  Classification     Has Housing: Not on file     Worried About Losing Housing: Not on file     Unable to Get Utilities: Not on file     Unable to Pay for Housing in the Last Year: No     Has Housin       Current Medications  Current Outpatient Medications   Medication Sig Dispense Refill    acetaminophen (TYLENOL) 325 mg tablet Take 2 tablets (650 mg total) by mouth every 4 (four) hours as needed for mild pain      Acetaminophen Extra Strength 500 MG TABS TAKE 1 TABLET (500 MG TOTAL) BY MOUTH EVERY 6 (SIX) HOURS AS NEEDED FOR MILD PAIN 120 tablet 0    clotrimazole (GYNE-LOTRIMIN) 1 % vaginal cream Insert 1 applicator into the vagina daily at bedtime 45 g 0    cyanocobalamin (VITAMIN B-12) 1000 MCG tablet Take 1,000 mcg by mouth daily      docusate sodium (COLACE) 100 mg capsule Take 1 capsule (100 mg total) by mouth 2 (two) times a day for 15 days 30 capsule 0    estrogens, conjugated (Premarin) vaginal cream Insert 0.3 g into the vagina 3 (three) times a week 30 g 6    fenofibrate (TRICOR) 145 mg tablet Take 1 tablet (145 mg total) by mouth daily 90 tablet 3    ferrous sulfate 324 (65 Fe) mg Take 1 tablet (324 mg total) by mouth daily before breakfast 30 tablet 5    folic acid (FOLVITE) 1 mg tablet Take 1 tablet (1 mg total) by mouth daily 30 tablet 5    hydrocortisone (ANUSOL-HC) 2.5 % rectal cream Apply topically 2 (two) times a day for 5 days 28 g 0    lidocaine (XYLOCAINE) 5 % ointment Apply topically as needed for mild pain 30 g 2    methotrexate 2.5 MG tablet Take 6 tablets (15 mg total) by mouth once a week 72 tablet 2    ondansetron (ZOFRAN) 4 mg tablet Take 1 tablet (4 mg total) by mouth every 8 (eight) hours as needed for nausea or vomiting (Patient not taking: Reported on 1/15/2025) 20 tablet 2    oxyCODONE (Roxicodone) 5 immediate release tablet Take 1 tablet (5 mg total) by mouth every 8 (eight) hours as needed for moderate pain Max Daily Amount: 15 mg 90 tablet 0    tamsulosin (FLOMAX) 0.4 mg Take  "1 capsule (0.4 mg total) by mouth daily with dinner 90 capsule 3    ursodiol (ACTIGALL) 300 mg capsule TAKE 3 CAPSULES (900 MG TOTAL) BY MOUTH IN THE MORNING 90 capsule 11     No current facility-administered medications for this visit.       Allergies  Allergies   Allergen Reactions    Oxaliplatin Shortness Of Breath     Reactions occurred with 2nd and 3rd infusions (about 1-3 hours from initiation of infusion) and required treatment with steroids and antihistamines. Please refer to allergy note on 2/7/2023 for detailed description of her reactions.    Morphine Nausea Only     \"Every dose made me nauseous\" (oral dosing only, can tolerate IV morphine)    Potassium Chloride Other (See Comments)     Pt reports \"burning\" with Iv potassium administration and wishes for it to be added to chart         Past Medical History, Social History, Family History, medications and allergies were reviewed.   "

## 2025-04-10 ENCOUNTER — PREP FOR PROCEDURE (OUTPATIENT)
Dept: PLASTIC SURGERY | Facility: CLINIC | Age: 60
End: 2025-04-10

## 2025-04-10 DIAGNOSIS — L72.9 SKIN CYST: Primary | ICD-10-CM

## 2025-04-10 NOTE — PATIENT INSTRUCTIONS
Problem: Pain - Adult  Goal: Verbalizes/displays adequate comfort level or baseline comfort level  Outcome: Progressing        Thank you for coming to your visit today. As we discussed you kidney function is normal.  Your potassium is stable with supplements. Please follow the recommendations below       Recommend low sodium (salt) food    Avoid nonsteroidal anti-inflammatory drugs such as Naprosyn, ibuprofen, Aleve, Advil, Celebrex, Meloxicam (Mobic) etc.  You can use Tylenol as needed if you do not have any liver condition to be concerned about    Try to avoid medications such as pantoprazole or  Protonix/Nexium or Esomeprazole)/Prilosec or omeprazole/Prevacid or lansoprazole/AcipHex or Rabeprazole. If you are able to, use Pepcid as this is safer for your kidneys.     Try to exercise at least 30 minutes 3 days a week to begin with with an ultimate goal of 5 days a week for at least 30 minutes  Early morning labs and urine       Next Visit in 3 months with results   If you need to see us earlier we can change the appointment for you      Miguel Harris MD  Nephrology Attending

## 2025-04-11 ENCOUNTER — ANESTHESIA EVENT (OUTPATIENT)
Dept: PERIOP | Facility: AMBULARY SURGERY CENTER | Age: 60
End: 2025-04-11
Payer: COMMERCIAL

## 2025-04-11 ENCOUNTER — PREP FOR PROCEDURE (OUTPATIENT)
Dept: UROLOGY | Facility: CLINIC | Age: 60
End: 2025-04-11

## 2025-04-11 ENCOUNTER — ANESTHESIA (OUTPATIENT)
Dept: PERIOP | Facility: AMBULARY SURGERY CENTER | Age: 60
End: 2025-04-11
Payer: COMMERCIAL

## 2025-04-11 ENCOUNTER — APPOINTMENT (OUTPATIENT)
Dept: RADIOLOGY | Facility: AMBULARY SURGERY CENTER | Age: 60
End: 2025-04-11
Payer: COMMERCIAL

## 2025-04-11 ENCOUNTER — HOSPITAL ENCOUNTER (OUTPATIENT)
Facility: AMBULARY SURGERY CENTER | Age: 60
Setting detail: OUTPATIENT SURGERY
Discharge: HOME | End: 2025-04-11
Attending: UROLOGY | Admitting: UROLOGY
Payer: COMMERCIAL

## 2025-04-11 ENCOUNTER — TELEPHONE (OUTPATIENT)
Dept: UROLOGY | Facility: CLINIC | Age: 60
End: 2025-04-11

## 2025-04-11 ENCOUNTER — NURSE TRIAGE (OUTPATIENT)
Dept: OTHER | Facility: OTHER | Age: 60
End: 2025-04-11

## 2025-04-11 VITALS
HEART RATE: 86 BPM | WEIGHT: 111 LBS | OXYGEN SATURATION: 96 % | HEIGHT: 63 IN | BODY MASS INDEX: 19.67 KG/M2 | SYSTOLIC BLOOD PRESSURE: 139 MMHG | RESPIRATION RATE: 16 BRPM | TEMPERATURE: 97 F | DIASTOLIC BLOOD PRESSURE: 50 MMHG

## 2025-04-11 DIAGNOSIS — N13.1 HYDRONEPHROSIS DUE TO URETERAL STRICTURE: Primary | ICD-10-CM

## 2025-04-11 DIAGNOSIS — N13.30 BILATERAL HYDRONEPHROSIS: ICD-10-CM

## 2025-04-11 DIAGNOSIS — C18.7 MALIGNANT NEOPLASM OF SIGMOID COLON (HCC): ICD-10-CM

## 2025-04-11 DIAGNOSIS — Z51.5 PALLIATIVE CARE PATIENT: ICD-10-CM

## 2025-04-11 DIAGNOSIS — N20.0 NEPHROLITHIASIS: Primary | ICD-10-CM

## 2025-04-11 PROCEDURE — 52332 CYSTOSCOPY AND TREATMENT: CPT | Performed by: UROLOGY

## 2025-04-11 PROCEDURE — C1758 CATHETER, URETERAL: HCPCS | Performed by: UROLOGY

## 2025-04-11 PROCEDURE — 74420 UROGRAPHY RTRGR +-KUB: CPT

## 2025-04-11 PROCEDURE — C1894 INTRO/SHEATH, NON-LASER: HCPCS | Performed by: UROLOGY

## 2025-04-11 PROCEDURE — C2625 STENT, NON-COR, TEM W/DEL SY: HCPCS | Performed by: UROLOGY

## 2025-04-11 PROCEDURE — C1769 GUIDE WIRE: HCPCS | Performed by: UROLOGY

## 2025-04-11 PROCEDURE — 52356 CYSTO/URETERO W/LITHOTRIPSY: CPT | Performed by: UROLOGY

## 2025-04-11 DEVICE — STENT URETERAL 6FR 22CM INLAY OPTIMA W/NITINOL GDWR: Type: IMPLANTABLE DEVICE | Site: URETER | Status: FUNCTIONAL

## 2025-04-11 RX ORDER — ONDANSETRON 2 MG/ML
4 INJECTION INTRAMUSCULAR; INTRAVENOUS ONCE AS NEEDED
Status: DISCONTINUED | OUTPATIENT
Start: 2025-04-11 | End: 2025-04-11 | Stop reason: HOSPADM

## 2025-04-11 RX ORDER — MAGNESIUM HYDROXIDE 1200 MG/15ML
LIQUID ORAL AS NEEDED
Status: DISCONTINUED | OUTPATIENT
Start: 2025-04-11 | End: 2025-04-11 | Stop reason: HOSPADM

## 2025-04-11 RX ORDER — OXYCODONE HYDROCHLORIDE 5 MG/1
5 TABLET ORAL ONCE AS NEEDED
Refills: 0 | Status: COMPLETED | OUTPATIENT
Start: 2025-04-11 | End: 2025-04-11

## 2025-04-11 RX ORDER — DEXAMETHASONE SODIUM PHOSPHATE 10 MG/ML
INJECTION, SOLUTION INTRAMUSCULAR; INTRAVENOUS AS NEEDED
Status: DISCONTINUED | OUTPATIENT
Start: 2025-04-11 | End: 2025-04-11

## 2025-04-11 RX ORDER — FENTANYL CITRATE 50 UG/ML
INJECTION, SOLUTION INTRAMUSCULAR; INTRAVENOUS AS NEEDED
Status: DISCONTINUED | OUTPATIENT
Start: 2025-04-11 | End: 2025-04-11

## 2025-04-11 RX ORDER — HYDROMORPHONE HCL/PF 1 MG/ML
0.5 SYRINGE (ML) INJECTION EVERY 2 HOUR PRN
Refills: 0 | Status: DISCONTINUED | OUTPATIENT
Start: 2025-04-11 | End: 2025-04-11 | Stop reason: HOSPADM

## 2025-04-11 RX ORDER — ACETAMINOPHEN 325 MG/1
650 TABLET ORAL EVERY 4 HOURS PRN
Start: 2025-04-11

## 2025-04-11 RX ORDER — FENTANYL CITRATE/PF 50 MCG/ML
25 SYRINGE (ML) INJECTION
Status: DISCONTINUED | OUTPATIENT
Start: 2025-04-11 | End: 2025-04-11 | Stop reason: HOSPADM

## 2025-04-11 RX ORDER — OXYCODONE HYDROCHLORIDE 5 MG/1
10 TABLET ORAL EVERY 4 HOURS PRN
Refills: 0 | Status: DISCONTINUED | OUTPATIENT
Start: 2025-04-11 | End: 2025-04-11 | Stop reason: HOSPADM

## 2025-04-11 RX ORDER — LIDOCAINE HYDROCHLORIDE 10 MG/ML
INJECTION, SOLUTION EPIDURAL; INFILTRATION; INTRACAUDAL; PERINEURAL AS NEEDED
Status: DISCONTINUED | OUTPATIENT
Start: 2025-04-11 | End: 2025-04-11

## 2025-04-11 RX ORDER — NAPROXEN 500 MG/1
500 TABLET ORAL 2 TIMES DAILY WITH MEALS
Qty: 10 TABLET | Refills: 0 | Status: SHIPPED | OUTPATIENT
Start: 2025-04-11 | End: 2025-04-16

## 2025-04-11 RX ORDER — MIDAZOLAM HYDROCHLORIDE 2 MG/2ML
INJECTION, SOLUTION INTRAMUSCULAR; INTRAVENOUS AS NEEDED
Status: DISCONTINUED | OUTPATIENT
Start: 2025-04-11 | End: 2025-04-11

## 2025-04-11 RX ORDER — PHENAZOPYRIDINE HYDROCHLORIDE 100 MG/1
200 TABLET, FILM COATED ORAL ONCE
Status: COMPLETED | OUTPATIENT
Start: 2025-04-11 | End: 2025-04-11

## 2025-04-11 RX ORDER — OXYBUTYNIN CHLORIDE 5 MG/1
5 TABLET ORAL EVERY 6 HOURS PRN
Qty: 30 TABLET | Refills: 0 | Status: SHIPPED | OUTPATIENT
Start: 2025-04-11

## 2025-04-11 RX ORDER — SODIUM CHLORIDE, SODIUM LACTATE, POTASSIUM CHLORIDE, CALCIUM CHLORIDE 600; 310; 30; 20 MG/100ML; MG/100ML; MG/100ML; MG/100ML
INJECTION, SOLUTION INTRAVENOUS CONTINUOUS PRN
Status: DISCONTINUED | OUTPATIENT
Start: 2025-04-11 | End: 2025-04-11

## 2025-04-11 RX ORDER — DOCUSATE SODIUM 100 MG/1
100 CAPSULE, LIQUID FILLED ORAL 2 TIMES DAILY
Qty: 30 CAPSULE | Refills: 0 | Status: SHIPPED | OUTPATIENT
Start: 2025-04-11 | End: 2025-04-26

## 2025-04-11 RX ORDER — PHENAZOPYRIDINE HYDROCHLORIDE 200 MG/1
200 TABLET, FILM COATED ORAL 3 TIMES DAILY PRN
Qty: 10 TABLET | Refills: 0 | Status: SHIPPED | OUTPATIENT
Start: 2025-04-11 | End: 2025-04-14

## 2025-04-11 RX ORDER — ACETAMINOPHEN 160 MG/5ML
650 SUSPENSION ORAL ONCE AS NEEDED
Status: DISCONTINUED | OUTPATIENT
Start: 2025-04-11 | End: 2025-04-11 | Stop reason: HOSPADM

## 2025-04-11 RX ORDER — ONDANSETRON 2 MG/ML
INJECTION INTRAMUSCULAR; INTRAVENOUS AS NEEDED
Status: DISCONTINUED | OUTPATIENT
Start: 2025-04-11 | End: 2025-04-11

## 2025-04-11 RX ORDER — CEFAZOLIN SODIUM 1 G/3ML
INJECTION, POWDER, FOR SOLUTION INTRAMUSCULAR; INTRAVENOUS AS NEEDED
Status: DISCONTINUED | OUTPATIENT
Start: 2025-04-11 | End: 2025-04-11

## 2025-04-11 RX ORDER — OXYCODONE HYDROCHLORIDE 5 MG/1
5 TABLET ORAL EVERY 8 HOURS PRN
Qty: 10 TABLET | Refills: 0 | Status: SHIPPED | OUTPATIENT
Start: 2025-04-11 | End: 2025-04-16

## 2025-04-11 RX ORDER — OXYCODONE HYDROCHLORIDE 5 MG/1
5 TABLET ORAL EVERY 4 HOURS PRN
Refills: 0 | Status: DISCONTINUED | OUTPATIENT
Start: 2025-04-11 | End: 2025-04-11 | Stop reason: HOSPADM

## 2025-04-11 RX ORDER — PROPOFOL 10 MG/ML
INJECTION, EMULSION INTRAVENOUS AS NEEDED
Status: DISCONTINUED | OUTPATIENT
Start: 2025-04-11 | End: 2025-04-11

## 2025-04-11 RX ADMIN — OXYCODONE HYDROCHLORIDE 5 MG: 5 TABLET ORAL at 09:52

## 2025-04-11 RX ADMIN — FENTANYL CITRATE 50 MCG: 50 INJECTION INTRAMUSCULAR; INTRAVENOUS at 08:40

## 2025-04-11 RX ADMIN — PROPOFOL 40 MG: 10 INJECTION, EMULSION INTRAVENOUS at 08:12

## 2025-04-11 RX ADMIN — FENTANYL CITRATE 25 MCG: 50 INJECTION INTRAMUSCULAR; INTRAVENOUS at 08:28

## 2025-04-11 RX ADMIN — PHENAZOPYRIDINE 200 MG: 100 TABLET ORAL at 09:52

## 2025-04-11 RX ADMIN — FENTANYL CITRATE 25 MCG: 50 INJECTION INTRAMUSCULAR; INTRAVENOUS at 08:33

## 2025-04-11 RX ADMIN — CEFAZOLIN 2000 MG: 1 INJECTION, POWDER, FOR SOLUTION INTRAMUSCULAR; INTRAVENOUS at 08:05

## 2025-04-11 RX ADMIN — ONDANSETRON 4 MG: 2 INJECTION INTRAMUSCULAR; INTRAVENOUS at 08:05

## 2025-04-11 RX ADMIN — LIDOCAINE HYDROCHLORIDE 50 MG: 10 INJECTION, SOLUTION EPIDURAL; INFILTRATION; INTRACAUDAL at 08:11

## 2025-04-11 RX ADMIN — MIDAZOLAM 2 MG: 1 INJECTION INTRAMUSCULAR; INTRAVENOUS at 08:05

## 2025-04-11 RX ADMIN — PROPOFOL 120 MG: 10 INJECTION, EMULSION INTRAVENOUS at 08:11

## 2025-04-11 RX ADMIN — DEXAMETHASONE SODIUM PHOSPHATE 6 MG: 10 INJECTION INTRAMUSCULAR; INTRAVENOUS at 08:16

## 2025-04-11 RX ADMIN — FENTANYL CITRATE 50 MCG: 50 INJECTION INTRAMUSCULAR; INTRAVENOUS at 08:37

## 2025-04-11 RX ADMIN — FENTANYL CITRATE 25 MCG: 50 INJECTION INTRAMUSCULAR; INTRAVENOUS at 08:18

## 2025-04-11 RX ADMIN — PROPOFOL 40 MG: 10 INJECTION, EMULSION INTRAVENOUS at 08:32

## 2025-04-11 RX ADMIN — SODIUM CHLORIDE, SODIUM LACTATE, POTASSIUM CHLORIDE, AND CALCIUM CHLORIDE: .6; .31; .03; .02 INJECTION, SOLUTION INTRAVENOUS at 07:40

## 2025-04-11 RX ADMIN — FENTANYL CITRATE 25 MCG: 50 INJECTION INTRAMUSCULAR; INTRAVENOUS at 08:14

## 2025-04-11 NOTE — ANESTHESIA PREPROCEDURE EVALUATION
Procedure:  CYSTOSCOPY RETROGRADE PYELOGRAM WITH INSERTION STENT URETERAL (Bilateral: Bladder)  EXCHANGE STENT URETERAL (Bilateral: Ureter)  cysto/ L. RGP/ L. URS with lithotripsy and L. stent insertion (Left: Bladder)    Relevant Problems   ANESTHESIA (within normal limits)      CARDIO   (+) Hemorrhoid   (+) Hemorrhoid prolapse   (+) Hypertension   (+) Nonrheumatic mitral valve regurgitation   (-) Angina at rest (HCC)   (-) Angina of effort (HCC)   (-) DAMICO (dyspnea on exertion)      ENDO (within normal limits)      GI/HEPATIC   (+) History of rectal cancer (HCC)   (+) Malignant neoplasm of sigmoid colon (HCC)   (+) Partial small bowel obstruction (HCC)      /RENAL   (+) THOR (acute kidney injury) (HCC)   (+) Benign hypertension with chronic kidney disease, stage III (HCC)   (+) Bilateral hydronephrosis   (+) CKD stage G3a/A2, GFR 45-59 and albumin creatinine ratio  mg/g (HCC)   (+) CKD stage G3a/A3, GFR 45-59 and albumin creatinine ratio >300 mg/g (HCC)   (+) Chronic kidney disease-mineral bone disorder (CKD-MBD) with stage 3a chronic kidney disease (HCC)   (+) Hydronephrosis due to ureteral stricture   (+) Nephrolithiasis      GYN (within normal limits)      HEMATOLOGY   (+) Iron deficiency anemia   (+) Pancytopenia (HCC)      MUSCULOSKELETAL   (+) Chronic back pain      NEURO/PSYCH   (+) Anxiety about health   (+) Chronic back pain   (+) Chronic pain after cancer treatment   (-) Seizures (HCC)      PULMONARY   (-) URI (upper respiratory infection)      Lab Results   Component Value Date    WBC 4.17 (L) 03/26/2025    HGB 8.4 (L) 03/26/2025    HCT 26.7 (L) 03/26/2025     (H) 03/26/2025     03/26/2025       Lab Results   Component Value Date    SODIUM 137 03/26/2025    K 4.1 03/26/2025     03/26/2025    CO2 27 03/26/2025    AGAP 7 03/26/2025    BUN 34 (H) 03/26/2025    CREATININE 1.28 03/26/2025    GLUC 93 03/06/2025    GLUF 93 03/26/2025    CALCIUM 10.0 03/26/2025    AST 22 03/26/2025     ALT 30 03/26/2025    ALKPHOS 126 (H) 03/26/2025    TP 7.4 03/26/2025    TBILI 0.35 03/26/2025    EGFR 46 03/26/2025         Physical Exam    Airway    Mallampati score: III  TM Distance: >3 FB  Neck ROM: full     Dental        Cardiovascular  Rhythm: regular, Rate: normal    Pulmonary   Breath sounds clear to auscultation    Other Findings  post-pubertal.      Anesthesia Plan  ASA Score- 3     Anesthesia Type- general with ASA Monitors.         Additional Monitors:     Airway Plan: LMA.           Plan Factors-Exercise tolerance (METS): >4 METS.    Chart reviewed.        Patient is not a current smoker.      Obstructive sleep apnea risk education given perioperatively.        Induction- intravenous.    Postoperative Plan- Plan for postoperative opioid use.     Perioperative Resuscitation Plan - Level 1 - Full Code.       Informed Consent- Anesthetic plan and risks discussed with patient.  I personally reviewed this patient with the CRNA. Discussed and agreed on the Anesthesia Plan with the CRNA..      NPO Status:  Vitals Value Taken Time   Date of last liquid 04/10/25 04/11/25 0714   Time of last liquid 2000 04/11/25 0714   Date of last solid 04/10/25 04/11/25 0714   Time of last solid 2000 04/11/25 0714

## 2025-04-11 NOTE — ANESTHESIA POSTPROCEDURE EVALUATION
Post-Op Assessment Note    CV Status:  Stable  Pain Score: 0    Pain management: adequate       Mental Status:  Sleepy and arousable   PONV Controlled:  None   Airway Patency:  Patent     Post Op Vitals Reviewed: Yes    No anethesia notable event occurred.    Staff: CRNA           Last Filed PACU Vitals:  Vitals Value Taken Time   Temp     Pulse     BP     Resp     SpO2

## 2025-04-11 NOTE — TELEPHONE ENCOUNTER
"FOLLOW UP: No follow up needed    REASON FOR CONVERSATION: Post-op    SYMPTOMS: dark blood in urine and only voided twice since being home from surgery    OTHER: N/A    DISPOSITION: Call PCP Now  On call provider stated patient should hydrate, take bladder antispasmodics, modest anti-inflammatories if otherwise not contraindicated. Should patient develop flank pain along with nausea/vomiting, fever or inability to void for in excess of 12 hours. Should go to the emergency room. Emergency Contact notified and verbalized understanding.      Reason for Disposition   [1] Caller has URGENT question AND [2] triager unable to answer question    Answer Assessment - Initial Assessment Questions  1. SYMPTOM: \"What's the main symptom you're concerned about?\" (e.g., pain, fever, vomiting)      Discomfort on lower left abdomen    2. ONSET: \"When did S/S  start?\"      Noticed today    3. SURGERY: \"What surgery did you have?\"       CYSTOSCOPY RETROGRADE PYELOGRAM WITH INSERTION STENT URETERAL (Bilateral: Bladder)       EXCHANGE STENT URETERAL (Bilateral: Ureter)     4. DATE of SURGERY: \"When was the surgery?\"       Today    5. ANESTHESIA: \"What type of anesthesia did you have?\" (e.g., general, spinal, epidural, local)      General     6. DRAINS: \"Were any drains place in or around the wound?\" (e.g., Hemovac, Hector-Reynolds, Penrose)      Denies    7. PAIN: \"Is there any pain?\" If Yes, ask: \"How bad is it?\"  (Scale 1-10; or mild, moderate, severe)      Mild     8. FEVER: \"Do you have a fever?\" If Yes, ask: \"What is your temperature, how was it measured, and when did it start?\"      Denies    9. VOMITING: \"Is there any vomiting?\" If Yes, ask: \"How many times?\"      Denies    10. BLEEDING: \"Is there any bleeding?\" If Yes, ask: \"How much?\" and \"Where?\"        Yes, noticed dark bleeding in the toilet twice    11. OTHER SYMPTOMS: \"Do you have any other symptoms?\" (e.g., drainage from wound, painful urination, constipation)        Only " voided twice since being home       Oxycodone at 1500    Protocols used: Post-Op Symptoms and Questions-Adult-AH

## 2025-04-11 NOTE — ANESTHESIA POSTPROCEDURE EVALUATION
Post-Op Assessment Note    CV Status:  Stable    Pain management: adequate       Mental Status:  Alert and awake   Hydration Status:  Euvolemic   PONV Controlled:  Controlled   Airway Patency:  Patent     Post Op Vitals Reviewed: Yes    No anethesia notable event occurred.    Staff: Anesthesiologist           Last Filed PACU Vitals:  Vitals Value Taken Time   Temp 97 °F (36.1 °C) 04/11/25 0915   Pulse 84 04/11/25 0915   /69 04/11/25 0915   Resp 15 04/11/25 0915   SpO2 100 % 04/11/25 0915       Modified Say:     Vitals Value Taken Time   Activity 2 04/11/25 0900   Respiration 2 04/11/25 0900   Circulation 2 04/11/25 0900   Consciousness 2 04/11/25 0900   Oxygen Saturation 2 04/11/25 0900     Modified Say Score: 10

## 2025-04-11 NOTE — OP NOTE
Operative Note     PATIENT:  Danielle Haque (MRN [id number])    DATE OF PROCEDURE:   4/11/2025    PRE-OP DIAGNOSIS:   1) right ureteral stricture  2.  Indwelling chronic right ureteral stent  3.  Left ureteral calculus with hydronephrosis    POST-OP DIAGNOSIS:   1) right ureteral stricture  2.  Indwelling chronic right ureteral stent  3.  Left ureteral calculus with hydronephrosis  #4 recurrent left ureteral stricture disease    PROCEDURES PERFORMED:  1) Cystoscopy  2) bilateral retrograde pyelography with fluoroscopic interpretation  3) Left ureteroscopy with laser lithotripsy of stone  4) Left ureteral stent placement  #5 right ureteral stent exchange    SURGEON:  José Luis Stephens MD    NOTE:  There were no qualified teaching residents to assist with this case    ANESTHESIA: General/LMA     COMPLICATIONS:   None    ANTIBIOTICS:  Ancef    INTRAOPERATIVE THROMBOEMBOLISM PROPHYLAXIS:  Pneumatic compression stockings     FINDINGS:  -Left retrograde pyelogram demonstrates recurrent stenosis of the mid ureter concerning for recurrent ureteral stricture disease  - Left ureteroscopy is performed, initially with a semirigid ureteroscope.  I was able to advance the endoscope with some difficulty immediately past the level of the recurrent ureteral stricture and visualize 3 free-floating stones within the ureter.  I did not visualize any stones embedded into the ureter  - Due to the severity of the ureteral stricture, maneuverability of the endoscope was very limited and I was unable to extract all of the stones.  I did make a gentle attempt to convert to a flexible ureteroscope however, even the inner obturator of a 10/12 Spanish ureteral access sheath would not pass past the stricture  - Due to the constellation of findings I elected to leave a left ureteral stent to perform passive dilation and plan for return to the operating room to address her ureteral stones and perform balloon dilation of the recurrent stricture  (I did not feel that it was smart to perform a balloon dilation today for fear that the untreated stones would become embedded into the ureter).      PROCEDURE IN DETAIL:   The patient was identified and brought to the OR.  Antibiotic prophylaxis and DVT prophylaxis were administered.  They were placed in the comfortable dorsal lithotomy position with care to pad all pressure points.  They were prepped and draped in the usual sterile fashion using hibiclens.  A surgical time out was performed with all in the room in agreement with the correct patient, procedure, indications, and laterality.  A 21-Estonian rigid cystoscope was used to enter the bladder.  The bladder was inspected in its entirety and there were no lesions noted.  The ureteral orifices were identified in their normal orthotopic positions.     I began by performing a right ureteral stent exchange.  A wire was advanced alongside the preplaced right ureteral stent, advanced the renal pelvis.  The ureteral stent was then removed under vision, retrograde pyelogram performed and a appropriately sized stent is replaced without difficulty.      The Left ureteral orifice was identified and a 5 Fr open ended catheter was placed into the ureteral orifice  .   A retrograde pyelogram was performed with the findings as described above.  A Sensor wire was advanced up to the kidney under fluoroscopic guidance.  Leaving this safety wire in place, the bladder was drained.       A   7.5 Estonian semi-rigid ureteroscope was advanced up the ureter under vision .    Findings as detailed above.  Again there is both radiographic and endoscopic concern for recurrent mid ureteral stricture disease.  This visually appears to be an ischemic stricture.  I was able to, with difficulty, advance a semirigid ureteroscope to and passed the level of the stricture.  No stones appeared embedded within the length of the stricture.  I was able to visualize 3 free-floating stones within the  ureter immediately proximal to the stricture.  I attempted to address the stone by utilizing a 375 µm laser fiber with low settings.  However maneuverability was severely limited due to the stricture and I was unable to treat all of the stones    I made a gentle attempt to convert to a flexible ureteroscope however even the 10 Albanian obturator from a 10/12 Albanian ureteral access sheath would not pass.  As such I felt the most prudent option was to place a ureteral stent to perform passive dilation and plan for return to the operating room to address her recurrent stricture and stone.   The ureteroscope was backed down the ureter under vision and there were no residual fragments and the ureter was noted to be intact with no injury.  A JJ stent was then passed up the wire  under fluoroscopic guidance into the kidney with a good curl noted in the kidney and in the bladder.   . The bladder was drained.      The patient was placed back supine, awakened from general anesthesia and brought to recovery room in stable condition.      ESTIMATED BLOOD LOSS:  Minimal      SPECIMENS:   No specimens collected during this procedure.     IMPLANTS:   Implant Name Type Inv. Item Serial No.  Lot No. LRB No. Used Action   STENT URETERAL 6FR 22CM INLAY OPTIMA W/NITINOL GDWR - [id number]  STENT URETERAL 6FR 22CM INLAY OPTIMA W/NITINOL GDWR  [facility] DIVISION [id number] Right 1 Implanted   STENT URETERAL 6FR 22CM INLAY OPTIMA W/NITINOL GDWR - [id number]  STENT URETERAL 6FR 22CM INLAY OPTIMA W/NITINOL GDWR  [facility] DIVISION [id number] Left 1 Implanted        COMPLICATIONS: None    DISPOSITION: PACU    PLAN:  Findings at the time of surgery reviewed with the patient and her .  Will plan for return to the operating room in the next 6 to 8 weeks at which time I will perform holmium laser lithotripsy as well as balloon dilation of the recurrent stricture of the ureter and a right ureteral stent exchange.

## 2025-04-11 NOTE — TELEPHONE ENCOUNTER
"Regarding: s/p urinary stents--blood in urine  ----- Message from Fiorella SOMERS sent at 4/11/2025  5:44 PM EDT -----  \"Danielle had stent surgery and there is blood in her urine, I uploaded a picture to her mychart\"    "

## 2025-04-11 NOTE — ANESTHESIA POSTPROCEDURE EVALUATION
Post-Op Assessment Note    CV Status:  Stable  Pain Score: 0    Pain management: adequate       Mental Status:  Sleepy and arousable   PONV Controlled:  None   Airway Patency:  Patent     Post Op Vitals Reviewed: Yes    No anethesia notable event occurred.    Staff: CRNA           Last Filed PACU Vitals:  Vitals Value Taken Time   Temp 97    Pulse 94 04/11/25 0858   /64    Resp 14    SpO2 100 % 04/11/25 0858   Vitals shown include unfiled device data.

## 2025-04-12 ENCOUNTER — HOSPITAL ENCOUNTER (OUTPATIENT)
Dept: INFUSION CENTER | Facility: CLINIC | Age: 60
Discharge: HOME | End: 2025-04-12
Payer: COMMERCIAL

## 2025-04-12 DIAGNOSIS — C20 RECTAL CANCER (HCC): ICD-10-CM

## 2025-04-12 DIAGNOSIS — Z95.828 PORT-A-CATH IN PLACE: Primary | ICD-10-CM

## 2025-04-12 DIAGNOSIS — C18.7 MALIGNANT NEOPLASM OF SIGMOID COLON (HCC): ICD-10-CM

## 2025-04-12 LAB
ANION GAP SERPL CALCULATED.3IONS-SCNC: 8 MMOL/L (ref 4–13)
BASOPHILS # BLD AUTO: 0.01 THOUSANDS/ÂΜL (ref 0–0.1)
BASOPHILS NFR BLD AUTO: 0 % (ref 0–1)
BUN SERPL-MCNC: 30 MG/DL (ref 5–25)
CALCIUM SERPL-MCNC: 9.9 MG/DL (ref 8.4–10.2)
CHLORIDE SERPL-SCNC: 105 MMOL/L (ref 96–108)
CO2 SERPL-SCNC: 24 MMOL/L (ref 21–32)
CREAT SERPL-MCNC: 1.55 MG/DL (ref 0.6–1.3)
EOSINOPHIL # BLD AUTO: 0.09 THOUSAND/ÂΜL (ref 0–0.61)
EOSINOPHIL NFR BLD AUTO: 1 % (ref 0–6)
ERYTHROCYTE [DISTWIDTH] IN BLOOD BY AUTOMATED COUNT: 14.2 % (ref 11.6–15.1)
GFR SERPL CREATININE-BSD FRML MDRD: 36 ML/MIN/1.73SQ M
GLUCOSE SERPL-MCNC: 89 MG/DL (ref 65–140)
HCT VFR BLD AUTO: 25.2 % (ref 34.8–46.1)
HGB BLD-MCNC: 8.5 G/DL (ref 11.5–15.4)
IMM GRANULOCYTES # BLD AUTO: 0.03 THOUSAND/UL (ref 0–0.2)
IMM GRANULOCYTES NFR BLD AUTO: 1 % (ref 0–2)
LYMPHOCYTES # BLD AUTO: 0.2 THOUSANDS/ÂΜL (ref 0.6–4.47)
LYMPHOCYTES NFR BLD AUTO: 3 % (ref 14–44)
MCH RBC QN AUTO: 32.8 PG (ref 26.8–34.3)
MCHC RBC AUTO-ENTMCNC: 33.7 G/DL (ref 31.4–37.4)
MCV RBC AUTO: 97 FL (ref 82–98)
MONOCYTES # BLD AUTO: 0.37 THOUSAND/ÂΜL (ref 0.17–1.22)
MONOCYTES NFR BLD AUTO: 6 % (ref 4–12)
NEUTROPHILS # BLD AUTO: 5.68 THOUSANDS/ÂΜL (ref 1.85–7.62)
NEUTS SEG NFR BLD AUTO: 89 % (ref 43–75)
NRBC BLD AUTO-RTO: 0 /100 WBCS
PLATELET # BLD AUTO: 271 THOUSANDS/UL (ref 149–390)
PMV BLD AUTO: 9 FL (ref 8.9–12.7)
POTASSIUM SERPL-SCNC: 3.6 MMOL/L (ref 3.5–5.3)
RBC # BLD AUTO: 2.59 MILLION/UL (ref 3.81–5.12)
SODIUM SERPL-SCNC: 137 MMOL/L (ref 135–147)
WBC # BLD AUTO: 6.38 THOUSAND/UL (ref 4.31–10.16)

## 2025-04-12 PROCEDURE — 85025 COMPLETE CBC W/AUTO DIFF WBC: CPT

## 2025-04-12 PROCEDURE — 80048 BASIC METABOLIC PNL TOTAL CA: CPT

## 2025-04-12 NOTE — PROGRESS NOTES
Pt arrives to infusion center for lab draw. Offering no complaints. PAC accessed without issue, brisk blood returned noted, labs obtained, line flushed, and deaccessed. Pt confirmed next appoint on 5/23 @0900 AN. MGS declined.

## 2025-04-16 ENCOUNTER — HOSPITAL ENCOUNTER (OUTPATIENT)
Dept: CT IMAGING | Facility: HOSPITAL | Age: 60
Discharge: HOME | End: 2025-04-16
Attending: INTERNAL MEDICINE
Payer: COMMERCIAL

## 2025-04-16 ENCOUNTER — APPOINTMENT (OUTPATIENT)
Dept: RADIOLOGY | Facility: HOSPITAL | Age: 60
End: 2025-04-16
Payer: COMMERCIAL

## 2025-04-16 DIAGNOSIS — C20 RECTAL CANCER (HCC): ICD-10-CM

## 2025-04-16 DIAGNOSIS — C18.7 MALIGNANT NEOPLASM OF SIGMOID COLON (HCC): ICD-10-CM

## 2025-04-16 PROCEDURE — 74177 CT ABD & PELVIS W/CONTRAST: CPT

## 2025-04-16 PROCEDURE — 71260 CT THORAX DX C+: CPT

## 2025-04-16 RX ADMIN — IOHEXOL 85 ML: 350 INJECTION, SOLUTION INTRAVENOUS at 10:05

## 2025-04-23 ENCOUNTER — PREP FOR PROCEDURE (OUTPATIENT)
Dept: UROLOGY | Facility: AMBULATORY SURGERY CENTER | Age: 60
End: 2025-04-23

## 2025-04-23 DIAGNOSIS — N20.1 URETERAL CALCULI: Primary | ICD-10-CM

## 2025-04-23 DIAGNOSIS — N13.5 URETERAL STRICTURE: ICD-10-CM

## 2025-04-23 DIAGNOSIS — R39.89 SUSPECTED UTI: ICD-10-CM

## 2025-04-23 DIAGNOSIS — Z01.818 PRE-OP TESTING: ICD-10-CM

## 2025-04-24 ENCOUNTER — OFFICE VISIT (OUTPATIENT)
Dept: HEMATOLOGY ONCOLOGY | Facility: CLINIC | Age: 60
End: 2025-04-24
Payer: COMMERCIAL

## 2025-04-24 VITALS
TEMPERATURE: 97.3 F | SYSTOLIC BLOOD PRESSURE: 99 MMHG | HEIGHT: 63 IN | HEART RATE: 97 BPM | DIASTOLIC BLOOD PRESSURE: 60 MMHG | RESPIRATION RATE: 18 BRPM | OXYGEN SATURATION: 99 % | BODY MASS INDEX: 20.38 KG/M2 | WEIGHT: 115 LBS

## 2025-04-24 DIAGNOSIS — C20 RECTAL CANCER (HCC): Primary | ICD-10-CM

## 2025-04-24 PROCEDURE — 99213 OFFICE O/P EST LOW 20 MIN: CPT | Performed by: INTERNAL MEDICINE

## 2025-04-24 NOTE — ASSESSMENT & PLAN NOTE
59-year-old female patient with lung sarcoidosis and stage IIA invasive moderately differentiated adenocarcinoma involving the upper rectum and lower sigmoid colon, initially diagnosed on October 28, 2022.  From December 2022 to May 2023, the patient received total neoadjuvant therapy (MIRI) for her moderately differentiated colorectal cancer.  On August 17, 2023, she underwent R0 LAR.  Pathology showed ypT2 N0 moderately differentiated adenocarcinoma of the rectum.  Subsequently, she initiated surveillance.  Her most recent contrast-enhanced CT scan of the chest, abdomen and pelvis on October 2, 2024, showed numerous bilateral pulmonary nodules, many of which are cavitary, unchanged from previous examination. Stable 1.3 cm irregular nodular opacity in the lingula was identified. There were no new or enlarging nodules. There was no tracheal or endobronchial lesion.  Identified lung abnormalities in CT scan of the chest occur in the setting of a patient with a well-established diagnosis of sarcoidosis.     Interim Assessment: Mrs. Haque is undergoing surveillance for rectal cancer.  Today, she does not have new complaints. On April 16, 2025 surveillance contrast-enhanced CT scan of the chest, abdomen and pelvis did not show metastatic or recurrent disease originating from from the primary adenocarcinoma of the rectum.  Stable bilateral lung nodules were identified, most likely are caused by lung sarcoidosis.  She does not have clinical or radiographic evidence of a static disease involving lungs, originating in the primary adenocarcinoma of the rectum.  To document stability of lung nodules, I recommend a follow-up contrast-enhanced CT scan of the chest in early August 2025.    Plan:  Contrast-enhanced CT scan of the chest on August 7 2025  [facility] August 14, 2025 for history and physical exam and to review results of contrast-enhanced CT scan of the chest

## 2025-04-24 NOTE — PROGRESS NOTES
Name: Danielle Haque      : 1965      MRN: [id number]  Encounter Provider: Phylicia Pandya MD  Encounter Date: 2025   Encounter department: [facility]  :  Assessment & Plan  History of rectal cancer (HCC)  59-year-old female patient with lung sarcoidosis and stage IIA invasive moderately differentiated adenocarcinoma involving the upper rectum and lower sigmoid colon, initially diagnosed on 2022.  From 2022 to May 2023, the patient received total neoadjuvant therapy (MIRI) for her moderately differentiated colorectal cancer.  On 2023, she underwent R0 LAR.  Pathology showed ypT2 N0 moderately differentiated adenocarcinoma of the rectum.  Subsequently, she initiated surveillance.  Her most recent contrast-enhanced CT scan of the chest, abdomen and pelvis on 2024, showed numerous bilateral pulmonary nodules, many of which are cavitary, unchanged from previous examination. Stable 1.3 cm irregular nodular opacity in the lingula was identified. There were no new or enlarging nodules. There was no tracheal or endobronchial lesion.  Identified lung abnormalities in CT scan of the chest occur in the setting of a patient with a well-established diagnosis of sarcoidosis.     Interim Assessment: Mrs. Haque is undergoing surveillance for rectal cancer.  Today, she does not have new complaints. On 2025 surveillance contrast-enhanced CT scan of the chest, abdomen and pelvis did not show metastatic or recurrent disease originating from from the primary adenocarcinoma of the rectum.  Stable bilateral lung nodules were identified, most likely are caused by lung sarcoidosis.  She does not have clinical or radiographic evidence of a static disease involving lungs, originating in the primary adenocarcinoma of the rectum.  To document stability of lung nodules, I recommend a follow-up contrast-enhanced CT scan of the chest in early  August 2025.    Plan:  Contrast-enhanced CT scan of the chest on August 7 2025  [facility] August 14, 2025 for history and physical exam and to review results of contrast-enhanced CT scan of the chest    Mrs. Haque understands and agrees with my management recommendations and plan of care. I answered questions to her satisfaction.      History of Present Illness   Chief Complaint   Patient presents with    Follow-up   59-year-old female patient with lung sarcoidosis and stage IIA invasive moderately differentiated adenocarcinoma involving the upper rectum and lower sigmoid colon, initially diagnosed on October 28, 2022.  From December 2022 to May 2023, the patient received total neoadjuvant therapy (MIRI) for her moderately differentiated colorectal cancer.  On August 17, 2023, she underwent R0 LAR.  Pathology showed ypT2 N0 moderately differentiated adenocarcinoma of the rectum.  Subsequently, she initiated surveillance.  Her most recent contrast-enhanced CT scan of the chest, abdomen and pelvis on October 2, 2024, showed numerous bilateral pulmonary nodules, many of which are cavitary, unchanged from previous examination. Stable 1.3 cm irregular nodular opacity in the lingula was identified. There were no new or enlarging nodules. There was no tracheal or endobronchial lesion.  Identified lung abnormalities in CT scan of the chest occur in the setting of a patient with a well-established diagnosis of sarcoidosis.     Interim History: Mrs. Haque is undergoing surveillance for rectal cancer.  Today she does not have new complaints.  She denies new systemic, gastrointestinal, cardiorespiratory, genitourinary, musculoskeletal, or neurologic symptoms.  On April 16, 2025 surveillance contrast-enhanced CT scan of the chest, abdomen and pelvis did not show metastatic or recurrent disease originating from from the primary adenocarcinoma of the rectum.  Specifically,multiple lung nodules were identified. Most of these nodules  were stable. However there were few nodules in the lower lungs which were changing in appearance with some of them showing mild thickening of  wall of the of the underlying cavitation and   evolving minimal solid nodularity. However, there was no new nodule which met the size threshold criteria for evaluation with an FDG PET-CT scan.  There were no new visceral metastatic disease. Bilateral ureteral stents were in place.  There was no new retroperitoneal lymphadenopathy.    Oncology History   Cancer Staging   History of rectal cancer (HCC)  Staging form: Colon and Rectum, AJCC 8th Edition  - Clinical: Stage IIA (cT3, cN0, cM0) - Signed by Edouard Ferrera MD on 9/6/2023  Total positive nodes: 0    Malignant neoplasm of sigmoid colon (HCC)  Staging form: Colon and Rectum, AJCC 8th Edition  - Clinical: Stage IIA (cT3, cN0, cM0) - Signed by Edouard Ferrera MD on 2/24/2023  Total positive nodes: 0  Oncology History   Malignant neoplasm of sigmoid colon (HCC)   10/28/2022 Biopsy    Colon, Rectosigmoid Colon Mass Biopsy (1 slide [id number] [facility], collected 10/28/2022):  - Adenocarcinoma arising in a tubular adenoma     12/1/2022 Biopsy    DIAGNOSIS LIVER, SEGMENT 3, RESECTION:  - HYALINIZED SUBCAPSULAR AND INTRA-PARENCHYMAL NODULES. NO MALIGNANCY SEEN. SEE NOTE  - MILD MACROVESICULAR STEATOSIS (25%). NO EVIDENCE OF STEATOHEPATITIS. TRICHOME STAIN SHOWS MILD PORTAL FIBROSIS.   - 2+ IRON DEPOSITION WITHIN KUPFFER CELLS (IRON STAIN), CONSISTENT WITH SECONDARY HEMOCHROMATOSIS.     NOTE: THIS LIKELY REPRESENTS A MARKEDLY SCLEROTIC HEMANGIOMA OR CHANGES SECONDARY TO INJURY.               12/1/2022 Surgery    Laparoscopic diverting colostomy with liver biopsy. Dr. Serrano.      12/25/2022 Initial Diagnosis    Malignant neoplasm of sigmoid colon (HCC)     2022 - 1/10/2023 Chemotherapy    Oxaliplatin. [place], [place]     2/24/2023 -  Cancer Staged    Staging form: Colon and Rectum, AJCC  8th Edition  - Clinical: Stage IIA (cT3, cN0, cM0) - Signed by Edouard Ferrera MD on 2/24/2023  Total positive nodes: 0       4/17/2023 - 5/27/2023 Chemotherapy    potassium chloride, 20 mEq, Intravenous, Once, 3 of 3 cycles  Administration: 20 mEq (4/17/2023), 20 mEq (4/24/2023), 20 mEq (5/1/2023)  alteplase (CATHFLO), 2 mg, Intracatheter, Every 1 Minute as needed, 6 of 6 cycles  fluorouracil (ADRUCIL) ambulatory infusion Soln, 225 mg/m2/day = 1,880 mg, Intravenous, Over 120 hours, 6 of 6 cycles     4/17/2023 - 5/25/2023 Radiation    Treatments:  Course: C1    Plan ID Energy Fractions Dose per Fraction (cGy) Dose Correction (cGy) Total Dose Delivered (cGy) Elapsed Days   CD Rectum 6X 3 / 3 180 0 540 2   Whole Pelvis 6X 25 / 25 180 0 4,500 35      Treatment Dates:  4/17/2023 - 5/25/2023.      History of rectal cancer (HCC)   7/14/2023 Initial Diagnosis    Rectal cancer (HCC)     9/6/2023 -  Cancer Staged    Staging form: Colon and Rectum, AJCC 8th Edition  - Clinical: Stage IIA (cT3, cN0, cM0) - Signed by Edouard Ferrera MD on 9/6/2023  Total positive nodes: 0          Pertinent Medical History   Past Medical History:   Diagnosis Date    Bleeding from colostomy stoma (HCC) 02/11/2023     states ileostomy , not colostomy    Cancer (HCC)     Colon cancer (HCC)     Elevated serum creatinine 09/11/2024    Fall     Headache     Hemochromatosis, unspecified     History of transfusion     within 2 months 4/2/2025    Hyperlipidemia     Hypertension     Hypokalemia 09/12/2024    Kidney calculi     Kidney stone     Liver disease     hemangioma    Muscle weakness     Personal history of COVID-19 12/2022    Sarcoidosis     Seizures (HCC)     2022    Shingles         Review of Systems   Constitutional:  Negative for activity change, appetite change, chills, diaphoresis, fatigue, fever and unexpected weight change.   HENT:  Negative for congestion, dental problem, ear discharge, ear pain, facial swelling,  "hearing loss, mouth sores, nosebleeds, postnasal drip, rhinorrhea, sinus pain, sneezing, sore throat, tinnitus, trouble swallowing and voice change.    Eyes:  Negative for photophobia, pain, discharge, redness, itching and visual disturbance.   Respiratory:  Negative for apnea, cough, choking, chest tightness, shortness of breath, wheezing and stridor.    Cardiovascular:  Negative for chest pain, palpitations and leg swelling.   Gastrointestinal:  Negative for abdominal distention, abdominal pain, anal bleeding, blood in stool, constipation, diarrhea, nausea, rectal pain and vomiting.   Endocrine: Negative for cold intolerance and heat intolerance.   Genitourinary:  Negative for decreased urine volume, difficulty urinating, dysuria, enuresis, flank pain, frequency, hematuria and urgency.   Musculoskeletal:  Negative for arthralgias, back pain, gait problem, joint swelling, myalgias, neck pain and neck stiffness.   Skin:  Negative for color change, pallor, rash and wound.   Neurological:  Negative for dizziness, tremors, seizures, syncope, facial asymmetry, speech difficulty, weakness, light-headedness, numbness and headaches.   Hematological:  Negative for adenopathy. Does not bruise/bleed easily.   Psychiatric/Behavioral:  Negative for behavioral problems, confusion, dysphoric mood and sleep disturbance. The patient is not nervous/anxious.      Objective   BP 99/60 (BP Location: Left arm, Patient Position: Sitting, Cuff Size: Adult)   Pulse 97   Temp (!) 97.3 °F (36.3 °C) (Temporal)   Resp 18   Ht 5' 3\" (1.6 m)   Wt 52.2 kg (115 lb)   SpO2 99%   BMI 20.37 kg/m²     Pain Screening:  Pain Score: 0-No pain    ECOG   0    Physical Exam  Constitutional:       Comments: Female patient without acute respiratory distress   HENT:      Head: Normocephalic and atraumatic.      Nose: Nose normal.      Mouth/Throat:      Mouth: Mucous membranes are moist.      Pharynx: Oropharynx is clear.   Eyes:      Extraocular " Movements: Extraocular movements intact.      Conjunctiva/sclera: Conjunctivae normal.      Pupils: Pupils are equal, round, and reactive to light.      Comments: Icterus   Neck:      Comments: No cervical, supraclavicular, axillary, epitrochlear, or inguinal lymphadenopathy.   Cardiovascular:      Rate and Rhythm: Normal rate and regular rhythm.      Pulses: Normal pulses.      Heart sounds: Normal heart sounds.   Pulmonary:      Effort: Pulmonary effort is normal.      Breath sounds: Normal breath sounds.   Abdominal:      General: Bowel sounds are normal.      Palpations: Abdomen is soft.      Comments: Abdomen soft, nontender, nonpainful.  No hepatomegaly.  No splenomegaly.  No rebound tenderness.  No lateral or shifting dullness.  Bowel sounds present, normal.    Musculoskeletal:         General: Normal range of motion.      Cervical back: Normal range of motion and neck supple.   Skin:     General: Skin is warm and dry.      Capillary Refill: Capillary refill takes less than 2 seconds.   Neurological:      General: No focal deficit present.      Mental Status: She is alert and oriented to person, place, and time. Mental status is at baseline.   Psychiatric:         Mood and Affect: Mood normal.         Behavior: Behavior normal.         Thought Content: Thought content normal.         Judgment: Judgment normal.         Labs: I have reviewed the following labs:  Lab Results   Component Value Date/Time    WBC 6.38 04/12/2025 10:14 AM    RBC 2.59 (L) 04/12/2025 10:14 AM    Hemoglobin 8.5 (L) 04/12/2025 10:14 AM    Hematocrit 25.2 (L) 04/12/2025 10:14 AM    MCV 97 04/12/2025 10:14 AM    MCH 32.8 04/12/2025 10:14 AM    RDW 14.2 04/12/2025 10:14 AM    Platelets 271 04/12/2025 10:14 AM    Segmented % 89 (H) 04/12/2025 10:14 AM    Lymphocytes % 3 (L) 04/12/2025 10:14 AM    Monocytes % 6 04/12/2025 10:14 AM    Eosinophils Relative 1 04/12/2025 10:14 AM    Basophils Relative 0 04/12/2025 10:14 AM    Immature Grans % 1  04/12/2025 10:14 AM    Absolute Neutrophils 5.68 04/12/2025 10:14 AM     Lab Results   Component Value Date/Time    Potassium 3.6 04/12/2025 10:14 AM    Chloride 105 04/12/2025 10:14 AM    CO2 24 04/12/2025 10:14 AM    BUN 30 (H) 04/12/2025 10:14 AM    Creatinine 1.55 (H) 04/12/2025 10:14 AM    Glucose, Fasting 93 03/26/2025 01:04 PM    Calcium 9.9 04/12/2025 10:14 AM    AST 22 03/26/2025 01:04 PM    ALT 30 03/26/2025 01:04 PM    Alkaline Phosphatase 126 (H) 03/26/2025 01:04 PM    Total Protein 7.4 03/26/2025 01:04 PM    Albumin 4.4 03/26/2025 01:04 PM    Total Bilirubin 0.35 03/26/2025 01:04 PM    eGFR 36 04/12/2025 10:14 AM

## 2025-04-28 ENCOUNTER — VBI (OUTPATIENT)
Dept: ADMINISTRATIVE | Facility: OTHER | Age: 60
End: 2025-04-28

## 2025-04-28 NOTE — TELEPHONE ENCOUNTER
04/28/25 7:17 AM     Chart reviewed for Pap Smear (HPV) aka Cervical Cancer Screening ; nothing is submitted to the patient's insurance at this time.     Nell Fitzgerald   [facility]

## 2025-04-29 ENCOUNTER — OFFICE VISIT (OUTPATIENT)
Dept: PULMONOLOGY | Facility: CLINIC | Age: 60
End: 2025-04-29
Payer: COMMERCIAL

## 2025-04-29 ENCOUNTER — ANESTHESIA EVENT (OUTPATIENT)
Dept: PERIOP | Facility: HOSPITAL | Age: 60
End: 2025-04-29
Payer: COMMERCIAL

## 2025-04-29 VITALS
HEART RATE: 69 BPM | DIASTOLIC BLOOD PRESSURE: 50 MMHG | OXYGEN SATURATION: 99 % | BODY MASS INDEX: 20.09 KG/M2 | HEIGHT: 63 IN | WEIGHT: 113.4 LBS | RESPIRATION RATE: 12 BRPM | SYSTOLIC BLOOD PRESSURE: 106 MMHG | TEMPERATURE: 96.5 F

## 2025-04-29 DIAGNOSIS — R91.8 PULMONARY NODULES: ICD-10-CM

## 2025-04-29 DIAGNOSIS — R93.89 ABNORMAL CT SCAN: ICD-10-CM

## 2025-04-29 DIAGNOSIS — D86.9 SARCOIDOSIS: Primary | ICD-10-CM

## 2025-04-29 PROCEDURE — 99213 OFFICE O/P EST LOW 20 MIN: CPT | Performed by: NURSE PRACTITIONER

## 2025-04-29 NOTE — PRE-PROCEDURE INSTRUCTIONS
Pre-Surgery Instructions:   Medication Instructions    acetaminophen (TYLENOL) 325 mg tablet Uses PRN- OK to take day of surgery-if needed with sip of water     cyanocobalamin (VITAMIN B-12) 1000 MCG tablet Stop taking 7 days prior to surgery.    fenofibrate (TRICOR) 145 mg tablet Take day of surgery. With sip of water     ferrous sulfate 324 (65 Fe) mg Hold day of surgery.    folic acid (FOLVITE) 1 mg tablet Hold day of surgery.    Melatonin 5 MG TABS Stop taking 7 days prior to surgery.    methotrexate 2.5 MG tablet Takes weekly on Fridays - next dose due 5/2/25    tamsulosin (FLOMAX) 0.4 mg Take night before surgery    ursodiol (ACTIGALL) 300 mg capsule Hold day of surgery.    Medication instructions for day of surgery reviewed. Please take all instructed medications with only a sip of water.       You will receive a call one business day prior to surgery with an arrival time and hospital directions. If your surgery is scheduled on a Monday, the hospital will be calling you on the Friday prior to your surgery. If you have not heard from anyone by 8pm, please call the hospital supervisor through the hospital  at [phone]. ([place] [phone] or [place] [phone]).    Do not eat or drink anything after midnight the night before your surgery, including candy, mints, lifesavers, or chewing gum. Do not drink alcohol 24hrs before your surgery. Try not to smoke at least 24hrs before your surgery.       Follow the pre surgery showering instructions as listed in the “My Surgical Experience Booklet” or otherwise provided by your surgeon's office. Do not use a blade to shave the surgical area 1 week before surgery. It is okay to use a clean electric clippers up to 24 hours before surgery. Do not apply any lotions, creams, including makeup, cologne, deodorant, or perfumes after showering on the day of your surgery. Do not use dry shampoo, hair spray, hair gel, or any type of hair products.     No contact  lenses, eye make-up, or artificial eyelashes. Remove nail polish, including gel polish, and any artificial, gel, or acrylic nails if possible. Remove all jewelry including rings and body piercing jewelry.     Wear causal clothing that is easy to take on and off. Consider your type of surgery.    Keep any valuables, jewelry, piercings at home. Please bring any specially ordered equipment (sling, braces) if indicated.    Arrange for a responsible person to drive you to and from the hospital on the day of your surgery. Please confirm the visitor policy for the day of your procedure when you receive your phone call with an arrival time.     Call the surgeon's office with any new illnesses, exposures, or additional questions prior to surgery.    Please reference your “My Surgical Experience Booklet” for additional information to prepare for your upcoming surgery.

## 2025-04-29 NOTE — PROGRESS NOTES
Pulmonary Follow-Up Note   Danielle Haque 59 y.o. female MRN: [id number]  5/3/2025      Assessment/Plan:    Problem List Items Addressed This Visit       Sarcoidosis - Primary    With lung and hepatic involvement with hypercalcemia   12/02/2024 - Ratio 89%, FVC 2.93L (93%, Z -0.55), FEV1 2.60L (106%, Z 0.44) - normal spirometry     Currently on methotrexate, follows with Rheumatology  Primary symptomatology currently is her hypercalcemia now on methotrexate    Calcium 10  Alk phos 126         Pulmonary nodules    See abnormal CT scan         Abnormal CT scan    Most recent CT 04/16/2025 shows multiple nodules with a few nodules in the lower lungs which are changing in the appearance with some of them showing mild thickening of the wall of the underlying cavitation and evolving minimal solid nodularity, do not meet the threshold criteria for evaluating with the PET scan.  No new nodules noted    Repeat imaging scheduled for August 2025          Return in about 4 months (around 8/29/2025).    All of Danielle's questions were answered prior to leaving the office today. She will follow-up in 4 months or sooner should the need arise. She is aware to call our office with any further questions or concerns.    History of Present Illness   Reason for Visit: Follow  up  Chief Complaint: Feels well  HPI: Danielle Haque is a 59 y.o. female who presents to the office today for follow up for Sarcoidosis.  She follows with Dr Hopson since 2023 when she evaluated for sarcoidosis.  She is currently on methotrexate and being followed by pulmonary and rheumatology for sarcoidosis with lung and hepatic involvement with hypercalcemia.  She presented to the office today overall stable.  Continues on methotrexate, calcium is stable.  Follows with Rheumatology.    She has a complicated history and has primarily been treated at [facility].  She states she had 1-2 years of worsened dyspnea and chest pain. She had developed kidney  stones with hypercalcemia (14.8 May 2022) and had CT revealing miliary pattern on her lung images.  She was eventually seen by pulmonary (Dr. De La O) and referred for surgical pleural biopsy for miliary pattern and pleural thickening on CT Chest and concern for MTB vs sarcoid.  Biopsy revealed NC granulomas and she was initiated on prednisone 40mg daily around July 2022. During this evaluation she had CT/PET which revealed lower sigmoid FDG avid lesion.  She was diagnosed with colon cancer had diverting colostomy and started on chemotherapy and XRT.  She had abnormal LFTs and underwent liver biopsy revealing concerns for hemochromatosis.   She had moved to [place] which had delayed some further cancer therapies.  Presented to the pulmonary office with Dr. Hopson in June 2023.    Sarcoidosis is currently stable.   She is following with oncology for continued rectal cancer surveillance   I reviewed CT chest, repeat imaging scheduled for August 2025.  I recommend follow-up in 4 months with Dr. Hopson.    Review of Systems   Respiratory:  Negative for cough, shortness of breath and wheezing.        Historical Information   Past Medical History:   Diagnosis Date    Anemia     Bleeding from colostomy stoma (HCC) 02/11/2023     states ileostomy , not colostomy    Cancer (HCC)     Chronic kidney disease     Colon cancer (HCC)     Cyst (solitary) of breast, left     Dermoid cyst of neck     RIGHT    Dermoid cyst of skin of back     LEFT    Elevated serum creatinine 09/11/2024    Fall     Headache     Hemochromatosis, unspecified     History of transfusion     within 2 months 4/2/2025    Hyperlipidemia     Hypokalemia 09/12/2024    Kidney calculi     Kidney stone     Liver disease     hemangioma    Muscle weakness     Personal history of COVID-19 12/2022    Sarcoidosis     Seizures (HCC)     2022-- one seizure at that time - no further seizures at this time    Shingles     Ureteral stent present      Past Surgical  History:   Procedure Laterality Date    ABSCESS DRAINAGE      left breast    BREAST BIOPSY      CHEST TUBE INSERTION      COLON SURGERY      colostomy    COLONOSCOPY      FACIAL/NECK BIOPSY Right 5/1/2025    Procedure: EXCISION OF DERMAL CYST RIGHT NECK WITH COMPLEX CLOSURE;  Surgeon: Sincere Johnston MD;  Location: [facility];  Service: Plastics    FL GUIDED CENTRAL VENOUS ACCESS DEVICE INSERTION  03/21/2023    FL RETROGRADE PYELOGRAM  01/23/2023    FL RETROGRADE PYELOGRAM  04/03/2023    FL RETROGRADE PYELOGRAM  7/21/2023    FL RETROGRADE PYELOGRAM  10/11/2023    FL RETROGRADE PYELOGRAM  12/15/2023    FL RETROGRADE PYELOGRAM  2/8/2024    FL RETROGRADE PYELOGRAM  5/10/2024    FL RETROGRADE PYELOGRAM  9/26/2024    FL RETROGRADE PYELOGRAM  10/18/2024    FL RETROGRADE PYELOGRAM  1/17/2025    FL RETROGRADE PYELOGRAM  4/11/2025    IR BIOPSY LIVER MASS  05/09/2023    LUNG BIOPSY      MS CYSTO W/INSERT URETERAL STENT Bilateral 5/10/2024    Procedure: EXCHANGE STENT URETERAL;  Surgeon: José Luis Stephens MD;  [facility];  Service: Urology    MS CYSTO W/INSERT URETERAL STENT Bilateral 10/18/2024    Procedure: EXCHANGE STENT URETERAL;  Surgeon: José Luis Stephens MD;  [facility];  Service: Urology    MS CYSTO W/INSERT URETERAL STENT Bilateral 4/11/2025    Procedure: EXCHANGE STENT URETERAL;  Surgeon: José Luis Stephens MD;  [facility];  Service: Urology    MS CYSTO/URETERO W/LITHOTRIPSY &INDWELL STENT INSRT Bilateral 01/23/2023    Procedure: CYSTOSCOPY  RIGHT URETEROSCOPY WITH LITHOTRIPSY HOLMIUM LASER, BILATERAL RETROGRADE PYELOGRAM AND BILATERAL  URETERAL STENT INSERTION;  Surgeon: José Luis Stephens MD;  [facility];  Service: Urology    MS CYSTO/URETERO W/LITHOTRIPSY &INDWELL STENT INSRT Right 04/03/2023    Procedure: RIGHT URETEROSCOPY WITH LITHOTRIPSY HOLMIUM LASER, RETROGRADE PYELOGRAM; BILATERAL EXCHANGE STENT URETERAL;  Surgeon: José Luis Stephens MD;  [facility];  Service: Urology    KS CYSTO/URETERO W/LITHOTRIPSY &INDWELL STENT INSRT Bilateral 10/11/2023    Procedure: CYSTOSCOPY URETEROSCOPY RETROGRADE PYELOGRAM AND INSERTION STENT URETERAL DIAGNOSTINC RIGHT URETEROSCOPY, REMOVAL STENT LEFT SIDE;  Surgeon: José Luis Stephens MD;  [facility];  Service: Urology    KS CYSTO/URETERO W/LITHOTRIPSY &INDWELL STENT INSRT Bilateral 9/26/2024    Procedure: CYSTOSCOPY ,LEFT  URETEROSCOPY,  BILATERAL  RETROGRADE PYELOGRAM AND BILATERAL URETERAL STENT EXCHANGE;  Surgeon: Favian Barber MD;  [facility];  Service: Urology    KS CYSTO/URETERO W/LITHOTRIPSY &INDWELL STENT INSRT Left 10/18/2024    Procedure: CYSTOSCOPY LEFT URETEROSCOPY, treatment of LEFT ureteral stricture, RETROGRADE PYELOGRAM AND BILATERAL STENT EXCHANGE;  Surgeon: José Luis Stephens MD;  [facility];  Service: Urology    KS CYSTO/URETERO W/LITHOTRIPSY &INDWELL STENT INSRT Left 4/11/2025    Procedure: cysto/ L. RGP/ L. URS with lithotripsy and L. stent insertion;  Surgeon: José Luis Stephens MD;  [facility];  Service: Urology    KS CYSTOURETHROSCOPY W/URETERAL CATHETERIZATION Bilateral 07/21/2023    Procedure: CYSTOSCOPY URETEROSCOPY RETROGRADE PYELOGRAM WITH BILATERAL STENT URETERAL EXCHANGE; LEFT LASER LITHOTRIPSY AND STONE BASKET RETRIEVAL;  Surgeon: José Luis Stephens MD;  [facility];  Service: Urology    KS CYSTOURETHROSCOPY W/URETERAL CATHETERIZATION Bilateral 12/15/2023    Procedure: CYSTOSCOPY, BILATERAL  RETROGRADE PYELOGRAM , STENT EXCHANGE RIGHT , LEFT URETEROSCOPY ,  HOLMIUM LASER WITH INSERTION LEFT URETERAL STENT;  Surgeon: Derick Bhakta MD;  [facility];  Service: Urology    KS CYSTOURETHROSCOPY W/URETERAL CATHETERIZATION Left 2/8/2024    Procedure: CYSTOSCOPY B/L RETROGRADE PYELOGRAM WITH B/L T URETERALSTENT EXCHANGE,LEFT URETEROSCOPY, DILATION LEFT URETERAL STICTURE;  Surgeon: José Luis Stephens,  MD;  [facility];  Service: Urology    IA CYSTOURETHROSCOPY W/URETERAL CATHETERIZATION Bilateral 5/10/2024    Procedure: CYSTOSCOPY RETROGRADE PYELOGRAM WITH INSERTION STENT URETERAL;  Surgeon: José Luis Stephens MD;  [facility];  Service: Urology    IA CYSTOURETHROSCOPY W/URETERAL CATHETERIZATION Right 1/17/2025    Procedure: CYSTOSCOPY RETROGRADE PYELOGRAM WITH INSERTION STENT URETERAL, LEFT RETROGRADE PYELOGRAM AND STENT REMOVAL;  Surgeon: José Luis Stephens MD;  [facility];  Service: Urology    IA CYSTOURETHROSCOPY W/URETERAL CATHETERIZATION Bilateral 4/11/2025    Procedure: CYSTOSCOPY RETROGRADE PYELOGRAM WITH INSERTION STENT URETERAL;  Surgeon: José Luis Stephens MD;  [facility];  Service: Urology    IA INSJ TUNNELED CTR VAD W/SUBQ PORT AGE 5 YR/> N/A 03/21/2023    Procedure: INSERTION VENOUS PORT ( PORT-A-CATH) IR;  Surgeon: Mark Amos DO;  [facility];  Service: Interventional Radiology    IA LAPS COLECTOMY PRTL W/COLOPXTSTMY LW ANAST N/A 8/17/2023    Procedure: RESECTION COLON LOW ANTERIOR LAPAROSCOPIC WITH ROBOTICS;  Surgeon: Sree Umanzor MD;  [facility];  Service: Colorectal    SKIN LESION EXCISION Left 5/1/2025    Procedure: EXCISION OF DERMAL CYSTS LEFT BREAST, LEFT BACK WITH COMPLEX CLOSURE.;  Surgeon: Sincere Johnston MD;  [facility];  Service: Plastics    TONSILLECTOMY      URINARY SURGERY Bilateral     bilateral stents     Family History   Problem Relation Age of Onset    Cancer Mother     Cirrhosis Father     Diabetes Father     Breast cancer Neg Hx     Breast cancer additional onset Neg Hx     Anesthesia problems Neg Hx      Social History   Social History     Substance and Sexual Activity   Alcohol Use Never    Comment: Denies any alcohol use per pt     Social History     Substance and Sexual Activity   Drug Use Never    Comment: Denies any drug use per pt     Social History     Tobacco Use   Smoking  Status Never    Passive exposure: Past   Smokeless Tobacco Never   Tobacco Comments    Never a smoker or use of any tobacco products per pt      E-Cigarette/Vaping    E-Cigarette Use Never User     Comments Denies any use per pt      E-Cigarette/Vaping Substances       Meds/Allergies     Current Outpatient Medications:     acetaminophen (TYLENOL) 325 mg tablet, Take 2 tablets (650 mg total) by mouth every 4 (four) hours as needed for mild pain, Disp: , Rfl:     acetaminophen (TYLENOL) 325 mg tablet, Take 2 tablets (650 mg total) by mouth every 4 (four) hours as needed for mild pain, Disp: , Rfl:     cyanocobalamin (VITAMIN B-12) 1000 MCG tablet, Take 1,000 mcg by mouth daily, Disp: , Rfl:     fenofibrate (TRICOR) 145 mg tablet, Take 1 tablet (145 mg total) by mouth daily, Disp: 90 tablet, Rfl: 3    ferrous sulfate 324 (65 Fe) mg, Take 1 tablet (324 mg total) by mouth daily before breakfast, Disp: 30 tablet, Rfl: 5    folic acid (FOLVITE) 1 mg tablet, Take 1 tablet (1 mg total) by mouth daily, Disp: 30 tablet, Rfl: 5    hydrocortisone (ANUSOL-HC) 2.5 % rectal cream, Apply topically 2 (two) times a day for 5 days, Disp: 28 g, Rfl: 0    methotrexate 2.5 MG tablet, Take 6 tablets (15 mg total) by mouth once a week (Patient taking differently: Take 15 mg by mouth once a week Takes weekly on Fridays - next dose is due 5/2/25), Disp: 72 tablet, Rfl: 2    oxybutynin (DITROPAN) 5 mg tablet, Take 1 tablet (5 mg total) by mouth every 6 (six) hours as needed (bladder spasms), Disp: 30 tablet, Rfl: 0    tamsulosin (FLOMAX) 0.4 mg, Take 1 capsule (0.4 mg total) by mouth daily with dinner, Disp: 90 capsule, Rfl: 3    ursodiol (ACTIGALL) 300 mg capsule, TAKE 3 CAPSULES (900 MG TOTAL) BY MOUTH IN THE MORNING, Disp: 90 capsule, Rfl: 11    acetaminophen (TYLENOL) 500 mg tablet, Take 1 tablet (500 mg total) by mouth every 4 (four) hours as needed for mild pain or moderate pain, Disp: 60 tablet, Rfl: 0    Melatonin 5 MG TABS, Take by  "mouth daily at bedtime, Disp: , Rfl:   Allergies   Allergen Reactions    Oxaliplatin Shortness Of Breath     Reactions occurred with 2nd and 3rd infusions (about 1-3 hours from initiation of infusion) and required treatment with steroids and antihistamines. Please refer to allergy note on 2/7/2023 for detailed description of her reactions.    Morphine Nausea Only     \"Every dose made me nauseous\" (oral dosing only, can tolerate IV morphine)    Potassium Chloride Other (See Comments)     Pt reports \"burning\" with Iv potassium administration and wishes for it to be added to chart       Vitals: Blood pressure 106/50, pulse 69, temperature (!) 96.5 °F (35.8 °C), temperature source Temporal, resp. rate 12, height 5' 3\" (1.6 m), weight 51.4 kg (113 lb 6.4 oz), SpO2 99%. Body mass index is 20.09 kg/m². Oxygen Therapy  SpO2: 99 %RA    Physical Exam:  Physical Exam  Vitals reviewed.   Constitutional:       General: She is not in acute distress.     Appearance: She is not ill-appearing.   HENT:      Head: Normocephalic and atraumatic.      Right Ear: External ear normal.      Left Ear: External ear normal.      Nose: Nose normal.      Mouth/Throat:      Mouth: Mucous membranes are moist.      Pharynx: Oropharynx is clear.   Eyes:      Extraocular Movements: Extraocular movements intact.      Pupils: Pupils are equal, round, and reactive to light.   Cardiovascular:      Rate and Rhythm: Normal rate and regular rhythm.      Pulses: Normal pulses.      Heart sounds: Normal heart sounds.   Pulmonary:      Effort: Pulmonary effort is normal.      Comments: Fine crackles noted in bilateral bases  Abdominal:      General: Bowel sounds are normal.      Tenderness: There is no abdominal tenderness.   Musculoskeletal:         General: Normal range of motion.      Cervical back: Normal range of motion and neck supple.      Right lower leg: No edema.      Left lower leg: No edema.   Skin:     General: Skin is warm and dry. " "  Neurological:      Mental Status: She is alert and oriented to person, place, and time.   Psychiatric:         Mood and Affect: Mood normal.         Behavior: Behavior normal.         Thought Content: Thought content normal.         Judgment: Judgment normal.       Imaging and other studies: Results Review Statement: I reviewed radiology reports from this admission including: CT chest.   Ct chest abdomen pelvis 04/16/2025   Again noted are multiple lung nodules. Most of these nodules are stable. However there are few nodules in the lower lungs which are changing in the appearance with some of them showing mild thickening of  wall of the of the underlying cavitation and   evolving minimal solid nodularity. However there is no new nodule which meets the size threshold criteria for evaluating with the PET scan. Further management on clinical basis of follow-up at 3 months can be can    Pulmonary Results (PFTs, PSG): Results Review Statement: I reviewed radiology reports from this admission including: PFT.   12/02/2024 - Ratio 89%, FVC 2.93L (93%, Z -0.55), FEV1 2.60L (106%, Z 0.44) - normal spirometry     ANTHONY Mandujano  [facility]    Portions of the record may have been created with voice recognition software.  Occasional wrong word or \"sound a like\" substitutions may have occurred due to the inherent limitations of voice recognition software.  Read the chart carefully and recognize, using context, where substitutions have occurred or contact the dictating provider.  "

## 2025-04-30 NOTE — ANESTHESIA PREPROCEDURE EVALUATION
Procedure:  EXCISION OF DERMAL CYST RIGHT NECK WITH COMPLEX CLOSURE (Right: Neck)  EXCISION OF DERMAL CYSTS LEFT BREAST, LEFT BACK WITH COMPLEX CLOSURE. (Left: Breast)    Relevant Problems   CARDIO   (+) Hemorrhoid   (+) Hemorrhoid prolapse   (+) Hypertension   (+) Nonrheumatic mitral valve regurgitation      GI/HEPATIC   (+) History of rectal cancer (HCC)   (+) Malignant neoplasm of sigmoid colon (HCC)   (+) Partial small bowel obstruction (HCC)      /RENAL   (+) THOR (acute kidney injury) (HCC)   (+) Benign hypertension with chronic kidney disease, stage III (HCC)   (+) Bilateral hydronephrosis   (+) CKD stage G3a/A2, GFR 45-59 and albumin creatinine ratio  mg/g (HCC)   (+) CKD stage G3a/A3, GFR 45-59 and albumin creatinine ratio >300 mg/g (HCC)   (+) Chronic kidney disease-mineral bone disorder (CKD-MBD) with stage 3a chronic kidney disease (HCC)   (+) Hydronephrosis due to ureteral stricture   (+) Nephrolithiasis      HEMATOLOGY   (+) Iron deficiency anemia   (+) Pancytopenia (HCC)      MUSCULOSKELETAL   (+) Chronic back pain      NEURO/PSYCH   (+) Anxiety about health   (+) Chronic back pain   (+) Chronic pain after cancer treatment      Urinary   (+) Ureteral stent present   (+) Ureteral stricture        Physical Exam    Airway    Mallampati score: II  TM Distance: >3 FB  Neck ROM: full     Dental   No notable dental hx     Cardiovascular  Cardiovascular exam normal    Pulmonary  Pulmonary exam normal     Other Findings  post-pubertal.      Anesthesia Plan  ASA Score- 3     Anesthesia Type- general with ASA Monitors.         Additional Monitors:     Airway Plan: ETT and LMA.           Plan Factors-Exercise tolerance (METS): >4 METS.    Chart reviewed. EKG reviewed.  Existing labs reviewed. Patient summary reviewed.    Patient is not a current smoker. Patient not instructed to abstain from smoking on day of procedure. Patient did not smoke on day of surgery.            Induction-  intravenous.    Postoperative Plan- Plan for postoperative opioid use.     Perioperative Resuscitation Plan - Level 1 - Full Code.       Informed Consent- Anesthetic plan and risks discussed with patient.  I personally reviewed this patient with the CRNA. Discussed and agreed on the Anesthesia Plan with the CRNA..      NPO Status:  No vitals data found for the desired time range.        Lab Results   Component Value Date    HGBA1C 5.0 03/26/2025       Lab Results   Component Value Date    K 3.6 04/12/2025     04/12/2025    CO2 24 04/12/2025    BUN 30 (H) 04/12/2025    CREATININE 1.55 (H) 04/12/2025    GLUF 93 03/26/2025    CALCIUM 9.9 04/12/2025    CORRECTEDCA 9.2 03/02/2024    AST 22 03/26/2025    ALT 30 03/26/2025    ALKPHOS 126 (H) 03/26/2025    EGFR 36 04/12/2025       Lab Results   Component Value Date    WBC 6.38 04/12/2025    HGB 8.5 (L) 04/12/2025    HCT 25.2 (L) 04/12/2025    MCV 97 04/12/2025     04/12/2025     Normal sinus rhythm  Nonspecific ST abnormality  Abnormal ECG  When compared with ECG of 15-Nov-2024 01:28,  No significant change was found  Confirmed by Ronal Christine ([id number]) on 12/23/2024 9:24:44 AM     Specimen Collected: 12/21/24 13:50      Denies illicit drug use/caffeine

## 2025-05-01 ENCOUNTER — HOSPITAL ENCOUNTER (OUTPATIENT)
Facility: HOSPITAL | Age: 60
Setting detail: OUTPATIENT SURGERY
Discharge: HOME | End: 2025-05-01
Attending: STUDENT IN AN ORGANIZED HEALTH CARE EDUCATION/TRAINING PROGRAM | Admitting: STUDENT IN AN ORGANIZED HEALTH CARE EDUCATION/TRAINING PROGRAM
Payer: COMMERCIAL

## 2025-05-01 ENCOUNTER — ANESTHESIA (OUTPATIENT)
Dept: PERIOP | Facility: HOSPITAL | Age: 60
End: 2025-05-01
Payer: COMMERCIAL

## 2025-05-01 VITALS
HEART RATE: 82 BPM | RESPIRATION RATE: 16 BRPM | SYSTOLIC BLOOD PRESSURE: 142 MMHG | WEIGHT: 113 LBS | DIASTOLIC BLOOD PRESSURE: 72 MMHG | BODY MASS INDEX: 20.02 KG/M2 | TEMPERATURE: 97.2 F | HEIGHT: 63 IN | OXYGEN SATURATION: 100 %

## 2025-05-01 DIAGNOSIS — L72.9 SKIN CYST: ICD-10-CM

## 2025-05-01 DIAGNOSIS — L98.9 SKIN LESION: Primary | ICD-10-CM

## 2025-05-01 PROCEDURE — 13101 CMPLX RPR TRUNK 2.6-7.5 CM: CPT | Performed by: STUDENT IN AN ORGANIZED HEALTH CARE EDUCATION/TRAINING PROGRAM

## 2025-05-01 PROCEDURE — 13131 CMPLX RPR F/C/C/M/N/AX/G/H/F: CPT

## 2025-05-01 PROCEDURE — 11402 EXC TR-EXT B9+MARG 1.1-2 CM: CPT | Performed by: STUDENT IN AN ORGANIZED HEALTH CARE EDUCATION/TRAINING PROGRAM

## 2025-05-01 PROCEDURE — 13101 CMPLX RPR TRUNK 2.6-7.5 CM: CPT

## 2025-05-01 PROCEDURE — 11422 EXC H-F-NK-SP B9+MARG 1.1-2: CPT

## 2025-05-01 PROCEDURE — 11402 EXC TR-EXT B9+MARG 1.1-2 CM: CPT

## 2025-05-01 PROCEDURE — 11422 EXC H-F-NK-SP B9+MARG 1.1-2: CPT | Performed by: STUDENT IN AN ORGANIZED HEALTH CARE EDUCATION/TRAINING PROGRAM

## 2025-05-01 PROCEDURE — 11406 EXC TR-EXT B9+MARG >4.0 CM: CPT

## 2025-05-01 PROCEDURE — 11406 EXC TR-EXT B9+MARG >4.0 CM: CPT | Performed by: STUDENT IN AN ORGANIZED HEALTH CARE EDUCATION/TRAINING PROGRAM

## 2025-05-01 PROCEDURE — 88304 TISSUE EXAM BY PATHOLOGIST: CPT | Performed by: PATHOLOGY

## 2025-05-01 PROCEDURE — 13131 CMPLX RPR F/C/C/M/N/AX/G/H/F: CPT | Performed by: STUDENT IN AN ORGANIZED HEALTH CARE EDUCATION/TRAINING PROGRAM

## 2025-05-01 RX ORDER — DEXAMETHASONE SODIUM PHOSPHATE 10 MG/ML
INJECTION, SOLUTION INTRAMUSCULAR; INTRAVENOUS AS NEEDED
Status: DISCONTINUED | OUTPATIENT
Start: 2025-05-01 | End: 2025-05-01

## 2025-05-01 RX ORDER — LIDOCAINE HYDROCHLORIDE 10 MG/ML
INJECTION, SOLUTION EPIDURAL; INFILTRATION; INTRACAUDAL; PERINEURAL AS NEEDED
Status: DISCONTINUED | OUTPATIENT
Start: 2025-05-01 | End: 2025-05-01

## 2025-05-01 RX ORDER — FENTANYL CITRATE/PF 50 MCG/ML
25 SYRINGE (ML) INJECTION
Status: DISCONTINUED | OUTPATIENT
Start: 2025-05-01 | End: 2025-05-01 | Stop reason: HOSPADM

## 2025-05-01 RX ORDER — SODIUM CHLORIDE, SODIUM LACTATE, POTASSIUM CHLORIDE, CALCIUM CHLORIDE 600; 310; 30; 20 MG/100ML; MG/100ML; MG/100ML; MG/100ML
20 INJECTION, SOLUTION INTRAVENOUS CONTINUOUS
Status: DISCONTINUED | OUTPATIENT
Start: 2025-05-01 | End: 2025-05-01 | Stop reason: HOSPADM

## 2025-05-01 RX ORDER — FENTANYL CITRATE 50 UG/ML
INJECTION, SOLUTION INTRAMUSCULAR; INTRAVENOUS AS NEEDED
Status: DISCONTINUED | OUTPATIENT
Start: 2025-05-01 | End: 2025-05-01

## 2025-05-01 RX ORDER — LIDOCAINE HYDROCHLORIDE AND EPINEPHRINE 10; 10 MG/ML; UG/ML
INJECTION, SOLUTION INFILTRATION; PERINEURAL AS NEEDED
Status: DISCONTINUED | OUTPATIENT
Start: 2025-05-01 | End: 2025-05-01 | Stop reason: HOSPADM

## 2025-05-01 RX ORDER — MIDAZOLAM HYDROCHLORIDE 2 MG/2ML
INJECTION, SOLUTION INTRAMUSCULAR; INTRAVENOUS AS NEEDED
Status: DISCONTINUED | OUTPATIENT
Start: 2025-05-01 | End: 2025-05-01

## 2025-05-01 RX ORDER — SODIUM CHLORIDE, SODIUM LACTATE, POTASSIUM CHLORIDE, CALCIUM CHLORIDE 600; 310; 30; 20 MG/100ML; MG/100ML; MG/100ML; MG/100ML
50 INJECTION, SOLUTION INTRAVENOUS CONTINUOUS
Status: DISCONTINUED | OUTPATIENT
Start: 2025-05-01 | End: 2025-05-01 | Stop reason: HOSPADM

## 2025-05-01 RX ORDER — CEFAZOLIN SODIUM 2 G/50ML
SOLUTION INTRAVENOUS AS NEEDED
Status: DISCONTINUED | OUTPATIENT
Start: 2025-05-01 | End: 2025-05-01

## 2025-05-01 RX ORDER — PROPOFOL 10 MG/ML
INJECTION, EMULSION INTRAVENOUS AS NEEDED
Status: DISCONTINUED | OUTPATIENT
Start: 2025-05-01 | End: 2025-05-01

## 2025-05-01 RX ORDER — ACETAMINOPHEN 500 MG
500 TABLET ORAL EVERY 4 HOURS PRN
Qty: 60 TABLET | Refills: 0 | Status: SHIPPED | OUTPATIENT
Start: 2025-05-01

## 2025-05-01 RX ORDER — ONDANSETRON 2 MG/ML
4 INJECTION INTRAMUSCULAR; INTRAVENOUS ONCE AS NEEDED
Status: DISCONTINUED | OUTPATIENT
Start: 2025-05-01 | End: 2025-05-01 | Stop reason: HOSPADM

## 2025-05-01 RX ORDER — CEFAZOLIN SODIUM 2 G/50ML
2000 SOLUTION INTRAVENOUS ONCE
Status: CANCELLED | OUTPATIENT
Start: 2025-05-01 | End: 2025-05-01

## 2025-05-01 RX ORDER — ONDANSETRON 2 MG/ML
INJECTION INTRAMUSCULAR; INTRAVENOUS AS NEEDED
Status: DISCONTINUED | OUTPATIENT
Start: 2025-05-01 | End: 2025-05-01

## 2025-05-01 RX ORDER — ALBUTEROL SULFATE 0.83 MG/ML
2.5 SOLUTION RESPIRATORY (INHALATION) ONCE AS NEEDED
Status: DISCONTINUED | OUTPATIENT
Start: 2025-05-01 | End: 2025-05-01 | Stop reason: HOSPADM

## 2025-05-01 RX ADMIN — FENTANYL CITRATE 50 MCG: 50 INJECTION, SOLUTION INTRAMUSCULAR; INTRAVENOUS at 09:57

## 2025-05-01 RX ADMIN — DEXAMETHASONE SODIUM PHOSPHATE 5 MG: 10 INJECTION, SOLUTION INTRAMUSCULAR; INTRAVENOUS at 09:37

## 2025-05-01 RX ADMIN — ONDANSETRON 4 MG: 2 INJECTION INTRAMUSCULAR; INTRAVENOUS at 09:45

## 2025-05-01 RX ADMIN — LIDOCAINE HYDROCHLORIDE 50 MG: 10 SOLUTION INTRAVENOUS at 09:30

## 2025-05-01 RX ADMIN — MIDAZOLAM 2 MG: 1 INJECTION INTRAMUSCULAR; INTRAVENOUS at 09:26

## 2025-05-01 RX ADMIN — PROPOFOL 120 MG: 10 INJECTION, EMULSION INTRAVENOUS at 09:30

## 2025-05-01 RX ADMIN — SODIUM CHLORIDE, SODIUM LACTATE, POTASSIUM CHLORIDE, AND CALCIUM CHLORIDE 50 ML/HR: .6; .31; .03; .02 INJECTION, SOLUTION INTRAVENOUS at 08:56

## 2025-05-01 RX ADMIN — FENTANYL CITRATE 50 MCG: 50 INJECTION, SOLUTION INTRAMUSCULAR; INTRAVENOUS at 09:34

## 2025-05-01 RX ADMIN — CEFAZOLIN SODIUM 2000 MG: 2 SOLUTION INTRAVENOUS at 09:32

## 2025-05-01 NOTE — OP NOTE
OPERATIVE REPORT  PATIENT NAME: Danielle Haque    :  1965  MRN: [id number]  Pt Location:  OR ROOM 08    SURGERY DATE: 2025    Surgeons and Role:     * Sincere Johnston MD - Primary     * Laura iRchardson PA-C - Assisting    Preop Diagnosis:  Skin cyst [L72.9]    Post-Op Diagnosis Codes:     * Skin cyst [L72.9]    Procedure(s):  Excision of right neck subcutaneous cyst (size 1.8x0.8 cm) with complex closure (total length complex closure 2.1 cm)  Excision of left breast cyst (size 4.8x1.5 cm) with complex closure (total length complex closure 5.2 cm)  Excision of left back subcutaneous cyst (size 1.6x0.8 cm) with complex closure (total length complex closure 1.9 cm)    Specimen(s):  ID Type Source Tests Collected by Time Destination   1 : left back cyst Tissue Cyst TISSUE EXAM Sincere Johnston MD 2025  9:44 AM    2 : left breast cyst Tissue Cyst TISSUE EXAM Sincere Johnston MD 2025 10:04 AM    3 : right neck cyst Tissue Cyst TISSUE EXAM Sincere Johnston MD 2025 10:11 AM        Estimated Blood Loss:   Minimal    Drains:  Ileostomy Loop RLQ (Active)   Number of days: 623       Ureteral Internal Stent Left ureter 6 Fr. (Active)   Number of days: 650       Ureteral Internal Stent Right ureter 7 Fr. (Active)   Number of days: 650       Ureteral Internal Stent Right ureter 7 Fr. (Active)   Number of days: 568       Ureteral Internal Stent Right ureter 6 Fr. (Active)   Number of days: 217       Ureteral Internal Stent Left ureter 6 Fr. (Active)   Number of days: 217       Ureteral Internal Stent Right ureter 6 Fr. (Active)   Number of days: 104       Ureteral Internal Stent Right ureter 6 Fr. (Active)   Number of days: 20       Anesthesia Type:   Choice    Operative Indications:  Skin cyst [L72.9]    Operative Findings:  Total of 16 cc of 1% lidocaine with epi    Complications:   None    Procedure and Technique:  Patient was brought to the operating room, transferred to the operating table in lazy lateral  right decubitus fashion. After undergoing general anesthesia, a timeout was performed at which point all patient identifiers were deemed to be correct. The left back was prepped and draped in the normal sterile fashion. I marked around the subcutaneous lesion including the draining pore. The area was infiltrated with local anesthetic. Next, the lesion was excised down to healthy subcutaneous tissue and subcutaneous cyst was sent for routine pathology. I proceeded with complex closure to allow for tension free closure of the wound. The surrounding skin flaps were raised at least one width of the defect in all directions to allow for tension free closure of the wound. The dermis was reapproximated with 4-0 vicryl, skin with subcuticular 4-0 monocryl followed by skin glue. The patient was repositioned supine and the same procedure was performed for the left breast lesion and right neck lesion. The areas were marked, infiltrated with local anesthesia, and excised down to healthy subcutaneous tissue and sent for routine pathology. Complex closure was performed by undermining surrounding skin flaps at least one width of the defect in all directions to allow for tension free closure of the wound. Closures were performed in same manner and suture as on the back.     This concluded the procedure. Patient tolerated the procedure well without complications. At the end of the case, all sponge, needle, and instrument counts were correct. Patient was awakened from anesthesia and taken to the PACU in stable condition.    I was present for the entire procedure, A qualified resident physician was not available and A physician assistant was required during the procedure for retraction, tissue handling, dissection and suturing        Patient Disposition:  PACU       SIGNATURE: Sincere Johnston MD  DATE: May 1, 2025  TIME: 10:30 AM

## 2025-05-01 NOTE — INTERVAL H&P NOTE
H&P reviewed. After examining the patient I find no changes in the patients condition since the H&P had been written.    Discussed scarring and suture removal. Patient acknowledged.    Vitals:    05/01/25 0849   BP: 106/69   Pulse: 80   Resp: 18   Temp: (!) 97.4 °F (36.3 °C)

## 2025-05-01 NOTE — ANESTHESIA POSTPROCEDURE EVALUATION
Post-Op Assessment Note    CV Status:  Stable  Pain Score: 0    Pain management: adequate       Mental Status:  Alert and awake   Hydration Status:  Euvolemic   PONV Controlled:  Controlled   Airway Patency:  Patent     Post Op Vitals Reviewed: Yes      Staff: Anesthesiologist, CRNA           Last Filed PACU Vitals:  Vitals Value Taken Time   Temp 97.8    Pulse 96 05/01/25 1033   /63    Resp 30 05/01/25 1033   SpO2 100 % 05/01/25 1033   Vitals shown include unfiled device data.

## 2025-05-01 NOTE — DISCHARGE INSTR - AVS FIRST PAGE
Discharge instructions    -Take Tylenol 500 mg for baseline mild to moderate pain control.   -Do not get incisions wet for 48 hours. When showering, do not let direct water pressure from shower hit your incision. Can leave incision open to air, or dress with bandage or gauze/tape for comfort.   -There is surgical glue over your incisions. Let glue fall of on its own, do not pick at it.   -No submerging incisions (no baths, pools, hottubs). Pat dry incision and can either leave it open to air, or cover with bandaid for comfort  -No strenuous activity, no heavy lifting (nothing over 5 lbs), no reaching above head, no hard pushing or pulling with arms.   -Resume regular diet and home medications  -Call Plastic Surgery office to schedule post-op follow-up visit in 10-14 days.    Sincere Johnston MD   [facility]astic [facility]   [address] [place] [address], [address]   [place] [address]   Office: [phone]

## 2025-05-01 NOTE — ANESTHESIA POSTPROCEDURE EVALUATION
Post-Op Assessment Note    CV Status:  Stable  Pain Score: 2    Pain management: adequate       Mental Status:  Alert and awake   Hydration Status:  Euvolemic   PONV Controlled:  Controlled   Airway Patency:  Patent     Post Op Vitals Reviewed: Yes    No anethesia notable event occurred.    Staff: Anesthesiologist           Last Filed PACU Vitals:  Vitals Value Taken Time   Temp 97.2 °F (36.2 °C) 05/01/25 1111   Pulse 82 05/01/25 1111   /72 05/01/25 1111   Resp 16 05/01/25 1111   SpO2 100 % 05/01/25 1111       Modified Say:     Vitals Value Taken Time   Activity 2 05/01/25 1100   Respiration 2 05/01/25 1100   Circulation 2 05/01/25 1100   Consciousness 2 05/01/25 1100   Oxygen Saturation 2 05/01/25 1100     Modified Say Score: 10

## 2025-05-03 NOTE — ASSESSMENT & PLAN NOTE
With lung and hepatic involvement with hypercalcemia   12/02/2024 - Ratio 89%, FVC 2.93L (93%, Z -0.55), FEV1 2.60L (106%, Z 0.44) - normal spirometry     Currently on methotrexate, follows with Rheumatology  Primary symptomatology currently is her hypercalcemia now on methotrexate    Calcium 10  Alk phos 126

## 2025-05-03 NOTE — ASSESSMENT & PLAN NOTE
Most recent CT 04/16/2025 shows multiple nodules with a few nodules in the lower lungs which are changing in the appearance with some of them showing mild thickening of the wall of the underlying cavitation and evolving minimal solid nodularity, do not meet the threshold criteria for evaluating with the PET scan.  No new nodules noted    Repeat imaging scheduled for August 2025

## 2025-05-06 PROCEDURE — 88304 TISSUE EXAM BY PATHOLOGIST: CPT | Performed by: PATHOLOGY

## 2025-05-08 ENCOUNTER — OFFICE VISIT (OUTPATIENT)
Age: 60
End: 2025-05-08

## 2025-05-08 DIAGNOSIS — L72.0 EPIDERMOID CYST: Primary | ICD-10-CM

## 2025-05-08 PROCEDURE — 99024 POSTOP FOLLOW-UP VISIT: CPT

## 2025-05-08 NOTE — PROGRESS NOTES
[facility]astic and Reconstructive [facility]  [address], [address], [place] [address]  [phone]    Patient Identification: Danielle Haque is a 59 y.o. female     History of Present Illness: The patient is a 59 y.o.  year-old female  who presents to the office for post-op visit. Patient is 7 days s/p Excision Of Dermal Cyst Right Neck With Complex Closure - Right and Excision Of Dermal Cysts Left Breast, Left Back With Complex Closure. - Left  on 5/1/2025 by Dr. Johnston  Patient is doing well at this time. She denies fevers, chills, signs of infection or pain. She has no complaints at this time.    Patient has no complaints at this time.    Past Medical History:   Diagnosis Date    Anemia     Bleeding from colostomy stoma (HCC) 02/11/2023     states ileostomy , not colostomy    Cancer (HCC)     Chronic kidney disease     Colon cancer (HCC)     Cyst (solitary) of breast, left     Dermoid cyst of neck     RIGHT    Dermoid cyst of skin of back     LEFT    Elevated serum creatinine 09/11/2024    Fall     Headache     Hemochromatosis, unspecified     History of transfusion     within 2 months 4/2/2025    Hyperlipidemia     Hypokalemia 09/12/2024    Kidney calculi     Kidney stone     Liver disease     hemangioma    Muscle weakness     Personal history of COVID-19 12/2022    Sarcoidosis     Seizures (HCC)     2022-- one seizure at that time - no further seizures at this time    Shingles     Ureteral stent present           Review of Systems  Constitutional: Denies fevers, chills or pain .  Skin: Denies any warmth, erythema, edema or mucopurulent drainage.     Physical Exam  Neck: Surgical incisions are clean, dry, and intact. Surgical glue in place. There are no signs or wound dehiscence.     Breast: Surgical incisions are clean, dry, and intact. Surgical glue in place. There are no signs or wound dehiscence.     Back: Surgical incisions are clean, dry, and intact. Surgical glue in place. There are no  signs or wound dehiscence.     Assessment and Plan:  The patient is an 59 y.o.  year-old female who presents to the office for post-op visit. Patient is 7 days s/p Excision Of Dermal Cyst Right Neck With Complex Closure - Right and Excision Of Dermal Cysts Left Breast, Left Back With Complex Closure. - Left  on 5/1/2025 by Dr. Johnston      -At today's visit surgical sites examined, healing well. No concerns.  -Reviewed pathology. All questions answered  -Educated patient surgical glue will flake off. May apply Aquaphor daily.   -Discussed daily scar massage and use of silicone scar gel/tape to promote scar softening and flattening at 4 weeks post-op. The patient was educated on scar care and that it can take up to 1 year for full scar maturation.encouraged use of sunscreen when outdoors  -The patient would like to return PRN at this time.  -The patient is to call the office with any questions or concerns. All of the patient's questions were answered at this time and they agree with the plan of care.      Maggi Etienne PA-C  [facility]

## 2025-05-22 ENCOUNTER — OFFICE VISIT (OUTPATIENT)
Age: 60
End: 2025-05-22

## 2025-05-22 VITALS
TEMPERATURE: 96.7 F | HEIGHT: 63 IN | HEART RATE: 88 BPM | SYSTOLIC BLOOD PRESSURE: 120 MMHG | DIASTOLIC BLOOD PRESSURE: 70 MMHG | BODY MASS INDEX: 20.73 KG/M2 | WEIGHT: 117 LBS

## 2025-05-22 DIAGNOSIS — R74.8 ELEVATED LIVER ENZYMES: ICD-10-CM

## 2025-05-22 DIAGNOSIS — D86.89 HEPATIC GRANULOMA ASSOCIATED WITH SARCOIDOSIS: Primary | ICD-10-CM

## 2025-05-22 NOTE — PROGRESS NOTES
[facility] - Outpatient Consultation  Danielle Haque 59 y.o. female MRN: [id number]  Encounter: [id number]    PCP:  Leena Anaya MD, [phone]  Referring Provider:  No ref. provider found,     Name: Danielle Haque      : 1965      MRN: [id number]  Encounter Provider: Akua Machuca MD  Encounter Date: 2025   Encounter department: [facility] [address]  :  Assessment & Plan  Hepatic granuloma associated with sarcoidosis  59 y.o. 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 She was last seen in 2024.      She was noted to have progressive cholestasis dating back to 2022 with intraoperative findings of diffuse hepatic nodules suggestive of cirrhosis. She underwent a wedge biopsy of segment 3 which showed hyalinized subcapsular and intra-parenchymal nodules with no malignancy. There was mild macrovesicular steatosis without steatohepatitis or advanced fibrosis. Pathology was suggestive of a sclerotic hemangioma with secondary hemochromatosis.     She had a repeat biopsy for liver lesion and was found to have non-necrotizing granulomas consistent with hepatic involvement of her sarcoidosis. She was also found to have sinusoidal dilatation and perisinusoidal fibrosis, possibly related to NRH in the context of chemotherapy exposure. She was started on prednisone and UDCA but has had recrudescence in ALP and ACE levels with steroid taper prompting fenofibrate therapy.     She has had ongoing hypercalcemia requiring hospitalizations and recurrent nephrolithiasis s/p ureteral stents. She was started on MTX by rheumatology with improvement and stable disease seen on CT. She has also been successfully weaned off of prednisone.     We reviewed her most recent labs which  showed downtrending ALP. Would recommend repeating labs to follow up on her liver chemistries but otherwise would not make any changes to her current therapy.     will return to clinic in 3-6 months or sooner if needed. Thank you for the opportunity to consult in her care.     - Repeat CBC, CMP and INR   - ACE and ESR/CRP  - Continue UDCA 900 mg daily; consider decreasing to 600 mg daily given weight loss   - Fenofibrate 145 mg daily  - MTX 15 mg daily  - US abdomen with elastography ordered     Orders:    Hepatic function panel; Future    Protime-INR; Future    Angiotensin converting enzyme; Future    Sedimentation rate, automated; Future    C-reactive protein; Future    US abdomen complete; Future    US elastography/UGAP; Future        History of Present Illness   Danielle Haque is a 59 y.o. female with 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) and adjuvant chemoXRT, recurrent NISHA, nephrolithiasis, biopsy-proven pulmonary sarcoidosis and hepatic sarcoidosis who presents for follow up. She was last seen in November 2024 by Dr. Rebolledo.     Interval events  - Started on MTX with improvement in hypercalcemia. CT 4/2025 showed multiple nodules with ?thickening of the wall with underlying cavitation and evolving solid nodularity. Repeat CT chest scheduled 8/7   - No metastatic disease on CT C/A/P  - Had excision of dermoid cyst    Extended liver history  She was noted to have progressive cholestasis dating back to December 2022 with intraoperative findings of diffuse hepatic nodules suggestive of cirrhosis. She underwent a wedge biopsy of segment 3 which showed hyalinized subcapsular and intra-parenchymal nodules with no malignancy. There was mild macrovesicular steatosis without steatohepatitis or advanced fibrosis. Pathology was suggestive of a sclerotic hemangioma with secondary hemochromatosis.     She had a repeat biopsy for liver lesion and was found to have non-necrotizing  "granulomas consistent with hepatic involvement of her sarcoidosis. She was also found to have sinusoidal dilatation and perisinusoidal fibrosis, possibly related to NRH in the context of chemotherapy exposure. She was started on prednisone and UDCA but has had recrudescence in ALP and ACE levels with steroid taper for which she was started on fenofibrate.     She had recurrent hypercalcemia after tapering her steroids and was hospitalized in November 2024. During this admission, she was seen by rheumatology and was noted to have  with normal vitamin D 1-25. She was treated with IVF.     HPI      Review of Systems   Constitutional: Negative.    HENT: Negative.     Eyes: Negative.    Respiratory: Negative.     Cardiovascular: Negative.    Gastrointestinal:  Positive for abdominal pain.   Endocrine: Negative.    Genitourinary: Negative.    Musculoskeletal: Negative.    Skin: Negative.    Allergic/Immunologic: Negative.    Neurological: Negative.    Hematological: Negative.    Psychiatric/Behavioral: Negative.      A complete review of systems is negative other than that noted above in the HPI.      Current Medications[1]  Objective   /70 (BP Location: Left arm, Patient Position: Sitting, Cuff Size: Adult)   Pulse 88   Temp (!) 96.7 °F (35.9 °C) (Tympanic)   Ht 5' 3\" (1.6 m)   Wt 53.1 kg (117 lb)   BMI 20.73 kg/m²     Physical Exam  Constitutional:       Appearance: Normal appearance.   HENT:      Head: Normocephalic and atraumatic.     Eyes:      General: No scleral icterus.     Extraocular Movements: Extraocular movements intact.      Pupils: Pupils are equal, round, and reactive to light.     Abdominal:      General: Abdomen is flat. There is no distension.     Skin:     General: Skin is warm and dry.      Coloration: Skin is not jaundiced.     Neurological:      General: No focal deficit present.      Mental Status: She is alert and oriented to person, place, and time.          Lab Results: I " personally reviewed relevant lab results.     LABS/RADIOLOGY/ENDOSCOPY:  Lab Results   Component Value Date    WBC 6.38 04/12/2025    HGB 8.5 (L) 04/12/2025    HCT 25.2 (L) 04/12/2025     04/12/2025    GLUF 93 03/26/2025    BUN 30 (H) 04/12/2025    CREATININE 1.55 (H) 04/12/2025    K 3.6 04/12/2025     04/12/2025    CO2 24 04/12/2025    BILIDIR 0.30 (H) 09/10/2024    ALKPHOS 126 (H) 03/26/2025    ALT 30 03/26/2025    AST 22 03/26/2025    CALCIUM 9.9 04/12/2025    EGFR 36 04/12/2025    TRIG 192 (H) 03/26/2025    HDL 68 03/26/2025    INR 1.09 03/06/2025    PTT 28 03/06/2025    GGT 45 08/15/2024       MELD 3.0: 10 at 12/9/2024  8:18 AM  MELD-Na: 8 at 12/9/2024  8:18 AM  Calculated from:  Serum Creatinine: 1.22 mg/dL at 12/9/2024  8:18 AM  Serum Sodium: 137 mmol/L at 12/9/2024  8:18 AM  Total Bilirubin: 0.36 mg/dL (Using min of 1 mg/dL) at 12/9/2024  8:18 AM  Serum Albumin: 4.1 g/dL (Using max of 3.5 g/dL) at 12/9/2024  8:18 AM  INR(ratio): 1 at 12/9/2024  8:18 AM  Age at listing (hypothetical): 59 years  Sex: Female at 12/9/2024  8:18 AM          Results for orders placed during the hospital encounter of 02/03/25    Colonoscopy    Impression  Healthy end-to-end colorectal anastomosis in the rectum      RECOMMENDATION:  Continue planned office followup to discuss/schedule ileostomy reversal operation  Repeat colonoscopy in 1 year, due: 2/3/2026  Personal history of colon cancer  Inadequate bowel preparation              Sree Umanzor MD    CT chest (4/16/2025)  Again noted are multiple lung nodules. Most of these nodules are stable. However there are few nodules in the lower lungs which are changing in the appearance with some of them showing mild thickening of  wall of the of the underlying cavitation and   evolving minimal solid nodularity. However there is no new nodule which meets the size threshold criteria for evaluating with the PET scan. Further management on clinical basis of follow-up at 3  months can be can     No new visceral metastatic disease     Bilateral ureteral stents in place     No new retroperitoneal lymphadenopathy              [1]   Current Outpatient Medications   Medication Sig Dispense Refill    acetaminophen (TYLENOL) 325 mg tablet Take 2 tablets (650 mg total) by mouth every 4 (four) hours as needed for mild pain      acetaminophen (TYLENOL) 325 mg tablet Take 2 tablets (650 mg total) by mouth every 4 (four) hours as needed for mild pain      acetaminophen (TYLENOL) 500 mg tablet Take 1 tablet (500 mg total) by mouth every 4 (four) hours as needed for mild pain or moderate pain 60 tablet 0    cyanocobalamin (VITAMIN B-12) 1000 MCG tablet Take 1,000 mcg by mouth in the morning.      fenofibrate (TRICOR) 145 mg tablet Take 1 tablet (145 mg total) by mouth daily 90 tablet 3    ferrous sulfate 324 (65 Fe) mg Take 1 tablet (324 mg total) by mouth daily before breakfast 30 tablet 5    folic acid (FOLVITE) 1 mg tablet Take 1 tablet (1 mg total) by mouth daily 30 tablet 5    Melatonin 5 MG TABS Take by mouth daily at bedtime      methotrexate 2.5 MG tablet Take 6 tablets (15 mg total) by mouth once a week (Patient taking differently: Take 15 mg by mouth once a week Takes weekly on Fridays - next dose is due 5/2/25) 72 tablet 2    oxybutynin (DITROPAN) 5 mg tablet Take 1 tablet (5 mg total) by mouth every 6 (six) hours as needed (bladder spasms) 30 tablet 0    tamsulosin (FLOMAX) 0.4 mg Take 1 capsule (0.4 mg total) by mouth daily with dinner 90 capsule 3    ursodiol (ACTIGALL) 300 mg capsule TAKE 3 CAPSULES (900 MG TOTAL) BY MOUTH IN THE MORNING 90 capsule 11    hydrocortisone (ANUSOL-HC) 2.5 % rectal cream Apply topically 2 (two) times a day for 5 days 28 g 0     No current facility-administered medications for this visit.

## 2025-05-22 NOTE — PATIENT INSTRUCTIONS
"Pt states she will schedule \"US Elastography/UGAP\" & \"US Abdomen Complete\" ; \"Central Scheduling\" phone number given to Pt @ [facility].  "

## 2025-05-23 ENCOUNTER — HOSPITAL ENCOUNTER (OUTPATIENT)
Dept: INFUSION CENTER | Facility: CLINIC | Age: 60
End: 2025-05-23
Payer: COMMERCIAL

## 2025-05-23 DIAGNOSIS — Z95.828 PORT-A-CATH IN PLACE: Primary | ICD-10-CM

## 2025-05-23 DIAGNOSIS — N20.1 URETERAL CALCULI: ICD-10-CM

## 2025-05-23 DIAGNOSIS — D86.9 SARCOIDOSIS: ICD-10-CM

## 2025-05-23 DIAGNOSIS — D86.89 HEPATIC GRANULOMA ASSOCIATED WITH SARCOIDOSIS: ICD-10-CM

## 2025-05-23 DIAGNOSIS — Z01.818 PRE-OP TESTING: ICD-10-CM

## 2025-05-23 DIAGNOSIS — Z79.899 HIGH RISK MEDICATION USE: ICD-10-CM

## 2025-05-23 DIAGNOSIS — R39.89 SUSPECTED UTI: ICD-10-CM

## 2025-05-23 DIAGNOSIS — N13.5 URETERAL STRICTURE: ICD-10-CM

## 2025-05-23 LAB
ALBUMIN SERPL BCG-MCNC: 4.2 G/DL (ref 3.5–5)
ALP SERPL-CCNC: 85 U/L (ref 34–104)
ALT SERPL W P-5'-P-CCNC: 17 U/L (ref 7–52)
AST SERPL W P-5'-P-CCNC: 20 U/L (ref 13–39)
BASOPHILS # BLD AUTO: 0.02 THOUSANDS/ÂΜL (ref 0–0.1)
BASOPHILS NFR BLD AUTO: 0 % (ref 0–1)
BILIRUB DIRECT SERPL-MCNC: 0.12 MG/DL (ref 0–0.2)
BILIRUB SERPL-MCNC: 0.46 MG/DL (ref 0.2–1)
CRP SERPL QL: 2.8 MG/L
EOSINOPHIL # BLD AUTO: 0.25 THOUSAND/ÂΜL (ref 0–0.61)
EOSINOPHIL NFR BLD AUTO: 6 % (ref 0–6)
ERYTHROCYTE [DISTWIDTH] IN BLOOD BY AUTOMATED COUNT: 13.2 % (ref 11.6–15.1)
ERYTHROCYTE [SEDIMENTATION RATE] IN BLOOD: 27 MM/HOUR (ref 0–29)
HCT VFR BLD AUTO: 29 % (ref 34.8–46.1)
HGB BLD-MCNC: 9.7 G/DL (ref 11.5–15.4)
IMM GRANULOCYTES # BLD AUTO: 0.02 THOUSAND/UL (ref 0–0.2)
IMM GRANULOCYTES NFR BLD AUTO: 0 % (ref 0–2)
INR PPP: 0.97 (ref 0.85–1.19)
LYMPHOCYTES # BLD AUTO: 0.38 THOUSANDS/ÂΜL (ref 0.6–4.47)
LYMPHOCYTES NFR BLD AUTO: 8 % (ref 14–44)
MCH RBC QN AUTO: 33.1 PG (ref 26.8–34.3)
MCHC RBC AUTO-ENTMCNC: 33.4 G/DL (ref 31.4–37.4)
MCV RBC AUTO: 99 FL (ref 82–98)
MONOCYTES # BLD AUTO: 0.38 THOUSAND/ÂΜL (ref 0.17–1.22)
MONOCYTES NFR BLD AUTO: 8 % (ref 4–12)
NEUTROPHILS # BLD AUTO: 3.45 THOUSANDS/ÂΜL (ref 1.85–7.62)
NEUTS SEG NFR BLD AUTO: 78 % (ref 43–75)
NRBC BLD AUTO-RTO: 0 /100 WBCS
PLATELET # BLD AUTO: 245 THOUSANDS/UL (ref 149–390)
PMV BLD AUTO: 9.7 FL (ref 8.9–12.7)
PROT SERPL-MCNC: 6.7 G/DL (ref 6.4–8.4)
PROTHROMBIN TIME: 13.6 SECONDS (ref 12.3–15)
RBC # BLD AUTO: 2.93 MILLION/UL (ref 3.81–5.12)
WBC # BLD AUTO: 4.5 THOUSAND/UL (ref 4.31–10.16)

## 2025-05-23 PROCEDURE — 80076 HEPATIC FUNCTION PANEL: CPT

## 2025-05-23 PROCEDURE — 86140 C-REACTIVE PROTEIN: CPT

## 2025-05-23 PROCEDURE — 85025 COMPLETE CBC W/AUTO DIFF WBC: CPT

## 2025-05-23 PROCEDURE — 85652 RBC SED RATE AUTOMATED: CPT

## 2025-05-23 PROCEDURE — 82164 ANGIOTENSIN I ENZYME TEST: CPT

## 2025-05-23 PROCEDURE — 85610 PROTHROMBIN TIME: CPT

## 2025-05-23 PROCEDURE — 87086 URINE CULTURE/COLONY COUNT: CPT | Performed by: UROLOGY

## 2025-05-23 NOTE — PROGRESS NOTES
Labs drawn via PAC by Mark ELLIS RN without incident. Confirmed next apt for 7/8 @ 8am @ [place]. Koki GUIDO.

## 2025-05-24 LAB — BACTERIA UR CULT: NORMAL

## 2025-05-26 ENCOUNTER — RESULTS FOLLOW-UP (OUTPATIENT)
Age: 60
End: 2025-05-26

## 2025-05-27 LAB — ACE SERPL-CCNC: 85 U/L (ref 14–82)

## 2025-05-28 ENCOUNTER — NURSE TRIAGE (OUTPATIENT)
Age: 60
End: 2025-05-28

## 2025-05-28 NOTE — TELEPHONE ENCOUNTER
"Answer Assessment - Initial Assessment Questions  1. REASON FOR CALL: \"What is the main reason for your call?\" or \"How can I best help you?\"      Patient's relative called in requesting patient's urine culture results are reviewed as she is scheduled for surgery on 6/13/25. Reports having bladder discomfort 3 days ago but stated it went away as of yesterday and no other symptoms. Please review and advise.    Urine Culture   <10,000 cfu/ml  Mixed Contaminants X2    Protocols used: Information Only Call - No Triage-Adult-[place]    "

## 2025-05-30 ENCOUNTER — ANESTHESIA EVENT (OUTPATIENT)
Dept: PERIOP | Facility: AMBULARY SURGERY CENTER | Age: 60
End: 2025-05-30
Payer: COMMERCIAL

## 2025-06-04 ENCOUNTER — OFFICE VISIT (OUTPATIENT)
Dept: PALLIATIVE MEDICINE | Facility: CLINIC | Age: 60
End: 2025-06-04
Payer: COMMERCIAL

## 2025-06-04 VITALS
SYSTOLIC BLOOD PRESSURE: 120 MMHG | OXYGEN SATURATION: 99 % | RESPIRATION RATE: 16 BRPM | TEMPERATURE: 97.3 F | BODY MASS INDEX: 21.26 KG/M2 | WEIGHT: 120 LBS | HEIGHT: 63 IN | DIASTOLIC BLOOD PRESSURE: 58 MMHG | HEART RATE: 91 BPM

## 2025-06-04 DIAGNOSIS — D86.9 SARCOIDOSIS: ICD-10-CM

## 2025-06-04 DIAGNOSIS — Z51.5 PALLIATIVE CARE PATIENT: ICD-10-CM

## 2025-06-04 DIAGNOSIS — C20 RECTAL CANCER (HCC): Primary | ICD-10-CM

## 2025-06-04 PROCEDURE — 99213 OFFICE O/P EST LOW 20 MIN: CPT | Performed by: STUDENT IN AN ORGANIZED HEALTH CARE EDUCATION/TRAINING PROGRAM

## 2025-06-04 RX ORDER — FERROUS SULFATE 324(65)MG
324 TABLET, DELAYED RELEASE (ENTERIC COATED) ORAL
Qty: 30 TABLET | Refills: 0 | OUTPATIENT
Start: 2025-06-04

## 2025-06-04 NOTE — ASSESSMENT & PLAN NOTE
Following Med/Onc - Dr. Pandya  -currently on surveillance  -plans for ongoing monitoring with repeat CT Chest in August 2025 with subsequent follow-up with Med/Onc    No longer requiring oxycodone for rectal pain. This has been stable and no longer causing issues.    Does take tylenol for her back discomfort and her non-cancer related pain.    Appetite is much improved from the months prior. Able to eat with good appetite.  She has gained weight since our last visit in March 2025 in which she weighed 108 lbs then. She is 120lbs on exam today.  She is in much better spirits today given her pain has improved and she feels closer to her previous normal. She is pleased with this.    No other questions or concerns at this time.

## 2025-06-04 NOTE — PATIENT INSTRUCTIONS
It was a pleasure speaking with you today.    As discussed:  -Continue your medications as prescribed.  -Continue with a bowel regimen to avoid constipation.    Follow-up in: 3-4 months    Please do not hesitate to call me sooner should you have any questions/concerns or needs.    Medication safety issues - Do not drive under the influence of narcotics (including opioids), watch for adverse effects including confusion / altered mental status / respiratory depression (slowed breathing), keep medications stored in a safe/locked environment, do not use alcohol while opioids or other narcotics are in your system. Do not travel with more than the minimum number of tablets or capsules required for the trip.    ER Precautions  Watch for red flag symptoms including, but not limited to fevers, chills, chest pain, shortness of breath, intractable nausea/vomiting/diarrhea, or acute intractable pain (especially if pain is new or has changed).

## 2025-06-04 NOTE — PROGRESS NOTES
Name: Danielle Haque      : 1965      MRN: [id number]  Encounter Provider: Jhon Banks DO  Encounter Date: 2025   Encounter department: [facility]  :  Assessment & Plan  History of rectal cancer (HCC)  Following Med/Onc - Dr. Pandya  -currently on surveillance  -plans for ongoing monitoring with repeat CT Chest in 2025 with subsequent follow-up with Med/Onc    No longer requiring oxycodone for rectal pain. This has been stable and no longer causing issues.    Does take tylenol for her back discomfort and her non-cancer related pain.    Appetite is much improved from the months prior. Able to eat with good appetite.  She has gained weight since our last visit in 2025 in which she weighed 108 lbs then. She is 120lbs on exam today.  She is in much better spirits today given her pain has improved and she feels closer to her previous normal. She is pleased with this.    No other questions or concerns at this time.       Sarcoidosis  Ongoing follow-up with her specialists.  Patient following GI for Hepatic granuloma with plans for Ultrasound        Palliative care patient  Psychosocial   Supportive listening provided  Normalized experience of patient/family  Provided anxiety containment     Referrals Placed / Medical Equipment Ordered  -None    Follow-Up Recommendations  -Follow-up with PCP and current medical specialists  -Follow-up with palliative care: 3-4 months or as needed           PDMP Review: I have reviewed the patient's controlled substance dispensing history in the Prescription Drug Monitoring Program in compliance with the [facility] regulations before prescribing any controlled substances.    History of Present Illness   Danielle Haque is a 59 y.o. female who presents for follow-up.    Patient is seen today in office. Alert, oriented, pleasantly conversant.  Patient is seen in NAD.    Appetite has been improving and has gained weight.    Pain - no intractable cancer  related pain issues. Her other pain related issues have been managed with APAP PRN.    Patient is well supported by family.      CT CAP 4/16/2025  Again noted are multiple lung nodules. Most of these nodules are stable. However there are few nodules in the lower lungs which are changing in the appearance with some of them showing mild thickening of  wall of the of the underlying cavitation and   evolving minimal solid nodularity. However there is no new nodule which meets the size threshold criteria for evaluating with the PET scan. Further management on clinical basis of follow-up at 3 months can be can     No new visceral metastatic disease     Bilateral ureteral stents in place     No new retroperitoneal lymphadenopathy    Medical History Reviewed by provider this encounter:  Tobacco  Allergies  Meds  Problems  Med Hx  Surg Hx  Fam Hx     .  Past Medical History   Past Medical History[1]  Past Surgical History[2]  Family History[3]   reports that she has never smoked. She has been exposed to tobacco smoke. She has never used smokeless tobacco. She reports that she does not drink alcohol and does not use drugs.  Current Outpatient Medications   Medication Instructions    acetaminophen (TYLENOL) 650 mg, Oral, Every 4 hours PRN    acetaminophen (TYLENOL) 650 mg, Oral, Every 4 hours PRN    acetaminophen (TYLENOL) 500 mg, Oral, Every 4 hours PRN    cyanocobalamin (VITAMIN B-12) 1,000 mcg, Daily    fenofibrate (TRICOR) 145 mg, Oral, Daily    ferrous sulfate 324 mg, Oral, Daily before breakfast    folic acid (FOLVITE) 1 mg, Oral, Daily    hydrocortisone (ANUSOL-HC) 2.5 % rectal cream Topical, 2 times daily    Melatonin 5 MG TABS Daily at bedtime    methotrexate 15 mg, Oral, Weekly    oxybutynin (DITROPAN) 5 mg, Oral, Every 6 hours PRN    tamsulosin (FLOMAX) 0.4 mg, Oral, Daily with dinner    ursodiol (ACTIGALL) 900 mg, Oral, Daily   Allergies[4]   Medications Ordered Prior to Encounter[5]   Social History[6]    "  Objective   /58 (BP Location: Left arm, Patient Position: Sitting, Cuff Size: Standard)   Pulse 91   Temp (!) 97.3 °F (36.3 °C) (Temporal)   Resp 16   Ht 5' 3\" (1.6 m)   Wt 54.4 kg (120 lb)   SpO2 99%   BMI 21.26 kg/m²     Physical Exam  Vitals reviewed.   Constitutional:       General: She is not in acute distress.     Appearance: She is not ill-appearing, toxic-appearing or diaphoretic.   HENT:      Head: Normocephalic and atraumatic.      Nose: Nose normal.     Eyes:      General:         Right eye: No discharge.         Left eye: No discharge.       Cardiovascular:      Rate and Rhythm: Normal rate.   Pulmonary:      Effort: Pulmonary effort is normal. No respiratory distress.   Abdominal:      General: Abdomen is flat. There is no distension.     Musculoskeletal:         General: No swelling.     Skin:     General: Skin is warm and dry.      Coloration: Skin is not jaundiced or pale.     Neurological:      General: No focal deficit present.      Mental Status: She is alert. Mental status is at baseline.     Psychiatric:         Mood and Affect: Mood normal.         Behavior: Behavior normal.         Thought Content: Thought content normal.         Judgment: Judgment normal.         Recent labs:  Lab Results   Component Value Date/Time    SODIUM 137 04/12/2025 10:14 AM    K 3.6 04/12/2025 10:14 AM    BUN 30 (H) 04/12/2025 10:14 AM    CREATININE 1.55 (H) 04/12/2025 10:14 AM    GLUC 89 04/12/2025 10:14 AM    CALCIUM 9.9 04/12/2025 10:14 AM    AST 20 05/23/2025 09:05 AM    ALT 17 05/23/2025 09:05 AM    ALB 4.2 05/23/2025 09:05 AM    TP 6.7 05/23/2025 09:05 AM    EGFR 36 04/12/2025 10:14 AM     Lab Results   Component Value Date/Time    HGB 9.7 (L) 05/23/2025 09:05 AM    WBC 4.50 05/23/2025 09:05 AM     05/23/2025 09:05 AM    INR 0.97 05/23/2025 09:05 AM    PTT 28 03/06/2025 02:17 PM     Lab Results   Component Value Date/Time    KDY4XTQVCGIL 1.063 09/13/2024 06:30 AM       Recent " Imaging:  Procedure: CT chest abdomen pelvis w contrast  Result Date: 4/22/2025  Impression: Again noted are multiple lung nodules. Most of these nodules are stable. However there are few nodules in the lower lungs which are changing in the appearance with some of them showing mild thickening of  wall of the of the underlying cavitation and evolving minimal solid nodularity. However there is no new nodule which meets the size threshold criteria for evaluating with the PET scan. Further management on clinical basis of follow-up at 3 months can be can No new visceral metastatic disease Bilateral ureteral stents in place No new retroperitoneal lymphadenopathy The study was marked in EPIC for significant notification. Workstation performed: [id number]     Procedure: FL retrograde pyelogram  Result Date: 4/11/2025  Impression: Fluoroscopic guidance provided for bilateral retrograde pyelogram. Please see separate procedure report for additional details. Workstation performed: [id number]     Procedure: CT renal stone study abdomen pelvis wo contrast  Result Date: 3/29/2025  Impression: 1.  Interval left ureteral stent removal. Worsened left hydroureteronephrosis compared to prior CT from 12/20/2024. 4 mm calculus left mid ureter is similar in position compared to prior. A few other punctate 2 mm calculi in the left distal ureter. 2.  Mild right hydronephrosis with right ureteral stent in place. A few punctate calculi adjacent to the proximal aspect of the stent as above. 3.  Scattered semisolid nodules in both lung bases, similar to prior. Recommend attention on future exams. Workstation performed: [id number]     Procedure: US kidney and bladder  Result Date: 3/13/2025  Impression: 1.  5 mm calculus within the proximal left ureter with moderate upstream hydronephrosis. 2.  Bilateral nonobstructing renal calculi measuring up to 4 mm. The study was marked in EPIC for immediate notification. Workstation performed:  [id number]       Administrative Statements   I have spent a total time of 21 minutes in caring for this patient on the day of the visit/encounter including Risks and benefits of tx options, Instructions for management, Patient and family education, Importance of tx compliance, Risk factor reductions, Impressions, Counseling / Coordination of care, Documenting in the medical record, Reviewing/placing orders in the medical record (including tests, medications, and/or procedures), and Obtaining or reviewing history  .   Topics discussed with the patient / family include symptom assessment and management, medication review, psychosocial support, supportive listening, and anticipatory guidance.       [1]   Past Medical History:  Diagnosis Date    Anemia     Bleeding from colostomy stoma (HCC) 02/11/2023     states ileostomy , not colostomy    Cancer (HCC)     Chronic kidney disease     Colon cancer (HCC)     Cyst (solitary) of breast, left     Dermoid cyst of neck     RIGHT    Dermoid cyst of skin of back     LEFT    Elevated serum creatinine 09/11/2024    Fall     Headache     Hemochromatosis, unspecified     History of transfusion     within 2 months 4/2/2025    Hyperlipidemia     Hypokalemia 09/12/2024    Kidney calculi     Kidney stone     Liver disease     hemangioma    Muscle weakness     Personal history of COVID-19 12/2022    Sarcoidosis     Seizures (HCC)     2022-- one seizure at that time - no further seizures at this time    Shingles     Ureteral stent present    [2]   Past Surgical History:  Procedure Laterality Date    ABSCESS DRAINAGE      left breast    BREAST BIOPSY      CHEST TUBE INSERTION      COLON SURGERY      colostomy    COLONOSCOPY      FACIAL/NECK BIOPSY Right 5/1/2025    Procedure: EXCISION OF DERMAL CYST RIGHT NECK WITH COMPLEX CLOSURE;  Surgeon: Sincere Johnston MD;  Location:  MAIN OR;  Service: Plastics    FL GUIDED CENTRAL VENOUS ACCESS DEVICE INSERTION  03/21/2023    FL  RETROGRADE PYELOGRAM  01/23/2023    FL RETROGRADE PYELOGRAM  04/03/2023    FL RETROGRADE PYELOGRAM  7/21/2023    FL RETROGRADE PYELOGRAM  10/11/2023    FL RETROGRADE PYELOGRAM  12/15/2023    FL RETROGRADE PYELOGRAM  2/8/2024    FL RETROGRADE PYELOGRAM  5/10/2024    FL RETROGRADE PYELOGRAM  9/26/2024    FL RETROGRADE PYELOGRAM  10/18/2024    FL RETROGRADE PYELOGRAM  1/17/2025    FL RETROGRADE PYELOGRAM  4/11/2025    IR BIOPSY LIVER MASS  05/09/2023    LUNG BIOPSY      IL CYSTO W/INSERT URETERAL STENT Bilateral 5/10/2024    Procedure: EXCHANGE STENT URETERAL;  Surgeon: José Luis Stephens MD;  [facility];  Service: Urology    IL CYSTO W/INSERT URETERAL STENT Bilateral 10/18/2024    Procedure: EXCHANGE STENT URETERAL;  Surgeon: José Luis Stephens MD;  [facility];  Service: Urology    IL CYSTO W/INSERT URETERAL STENT Bilateral 4/11/2025    Procedure: EXCHANGE STENT URETERAL;  Surgeon: José Luis Stephens MD;  [facility];  Service: Urology    IL CYSTO/URETERO W/LITHOTRIPSY &INDWELL STENT INSRT Bilateral 01/23/2023    Procedure: CYSTOSCOPY  RIGHT URETEROSCOPY WITH LITHOTRIPSY HOLMIUM LASER, BILATERAL RETROGRADE PYELOGRAM AND BILATERAL  URETERAL STENT INSERTION;  Surgeon: José Luis Stephens MD;  [facility];  Service: Urology    IL CYSTO/URETERO W/LITHOTRIPSY &INDWELL STENT INSRT Right 04/03/2023    Procedure: RIGHT URETEROSCOPY WITH LITHOTRIPSY HOLMIUM LASER, RETROGRADE PYELOGRAM; BILATERAL EXCHANGE STENT URETERAL;  Surgeon: José Luis Stephens MD;  [facility];  Service: Urology    IL CYSTO/URETERO W/LITHOTRIPSY &INDWELL STENT INSRT Bilateral 10/11/2023    Procedure: CYSTOSCOPY URETEROSCOPY RETROGRADE PYELOGRAM AND INSERTION STENT URETERAL DIAGNOSTINC RIGHT URETEROSCOPY, REMOVAL STENT LEFT SIDE;  Surgeon: José Luis Stephens MD;  [facility];  Service: Urology    IL CYSTO/URETERO W/LITHOTRIPSY &INDWELL STENT INSRT Bilateral 9/26/2024     Procedure: CYSTOSCOPY ,LEFT  URETEROSCOPY,  BILATERAL  RETROGRADE PYELOGRAM AND BILATERAL URETERAL STENT EXCHANGE;  Surgeon: Favian Barber MD;  [facility];  Service: Urology    TX CYSTO/URETERO W/LITHOTRIPSY &INDWELL STENT INSRT Left 10/18/2024    Procedure: CYSTOSCOPY LEFT URETEROSCOPY, treatment of LEFT ureteral stricture, RETROGRADE PYELOGRAM AND BILATERAL STENT EXCHANGE;  Surgeon: José Luis Stephens MD;  [facility];  Service: Urology    TX CYSTO/URETERO W/LITHOTRIPSY &INDWELL STENT INSRT Left 4/11/2025    Procedure: cysto/ L. RGP/ L. URS with lithotripsy and L. stent insertion;  Surgeon: José Luis Stephens MD;  [facility];  Service: Urology    TX CYSTOURETHROSCOPY W/URETERAL CATHETERIZATION Bilateral 07/21/2023    Procedure: CYSTOSCOPY URETEROSCOPY RETROGRADE PYELOGRAM WITH BILATERAL STENT URETERAL EXCHANGE; LEFT LASER LITHOTRIPSY AND STONE BASKET RETRIEVAL;  Surgeon: José Luis Stephens MD;  [facility];  Service: Urology    TX CYSTOURETHROSCOPY W/URETERAL CATHETERIZATION Bilateral 12/15/2023    Procedure: CYSTOSCOPY, BILATERAL  RETROGRADE PYELOGRAM , STENT EXCHANGE RIGHT , LEFT URETEROSCOPY ,  HOLMIUM LASER WITH INSERTION LEFT URETERAL STENT;  Surgeon: Derick Bhakta MD;  [facility];  Service: Urology    TX CYSTOURETHROSCOPY W/URETERAL CATHETERIZATION Left 2/8/2024    Procedure: CYSTOSCOPY B/L RETROGRADE PYELOGRAM WITH B/L T URETERALSTENT EXCHANGE,LEFT URETEROSCOPY, DILATION LEFT URETERAL STICTURE;  Surgeon: José Luis Stephens MD;  [facility];  Service: Urology    TX CYSTOURETHROSCOPY W/URETERAL CATHETERIZATION Bilateral 5/10/2024    Procedure: CYSTOSCOPY RETROGRADE PYELOGRAM WITH INSERTION STENT URETERAL;  Surgeon: José Luis Stephens MD;  [facility];  Service: Urology    TX CYSTOURETHROSCOPY W/URETERAL CATHETERIZATION Right 1/17/2025    Procedure: CYSTOSCOPY RETROGRADE PYELOGRAM WITH INSERTION STENT  "URETERAL, LEFT RETROGRADE PYELOGRAM AND STENT REMOVAL;  Surgeon: José Luis Stephens MD;  [facility];  Service: Urology    WY CYSTOURETHROSCOPY W/URETERAL CATHETERIZATION Bilateral 4/11/2025    Procedure: CYSTOSCOPY RETROGRADE PYELOGRAM WITH INSERTION STENT URETERAL;  Surgeon: José Luis Stephens MD;  [facility];  Service: Urology    WY INSJ TUNNELED CTR VAD W/SUBQ PORT AGE 5 YR/> N/A 03/21/2023    Procedure: INSERTION VENOUS PORT ( PORT-A-CATH) IR;  Surgeon: Mark Amos DO;  [facility];  Service: Interventional Radiology    WY LAPS COLECTOMY PRTL W/COLOPXTSTMY LW ANAST N/A 8/17/2023    Procedure: RESECTION COLON LOW ANTERIOR LAPAROSCOPIC WITH ROBOTICS;  Surgeon: Sree Umanzor MD;  [facility];  Service: Colorectal    SKIN LESION EXCISION Left 5/1/2025    Procedure: EXCISION OF DERMAL CYSTS LEFT BREAST, LEFT BACK WITH COMPLEX CLOSURE.;  Surgeon: Sincere Johnston MD;  [facility]: [facility];  Service: Plastics    TONSILLECTOMY      URINARY SURGERY Bilateral     bilateral stents   [3]   Family History  Problem Relation Name Age of Onset    Cancer Mother Saar     Cirrhosis Father none     Diabetes Father none     Breast cancer Neg Hx      Breast cancer additional onset Neg Hx      Anesthesia problems Neg Hx     [4]   Allergies  Allergen Reactions    Oxaliplatin Shortness Of Breath     Reactions occurred with 2nd and 3rd infusions (about 1-3 hours from initiation of infusion) and required treatment with steroids and antihistamines. Please refer to allergy note on 2/7/2023 for detailed description of her reactions.    Morphine Nausea Only     \"Every dose made me nauseous\" (oral dosing only, can tolerate IV morphine)    Potassium Chloride Other (See Comments)     Pt reports \"burning\" with Iv potassium administration and wishes for it to be added to chart   [5]   Current Outpatient Medications on File Prior to Visit   Medication Sig Dispense Refill    acetaminophen " (TYLENOL) 325 mg tablet Take 2 tablets (650 mg total) by mouth every 4 (four) hours as needed for mild pain      acetaminophen (TYLENOL) 500 mg tablet Take 1 tablet (500 mg total) by mouth every 4 (four) hours as needed for mild pain or moderate pain 60 tablet 0    cyanocobalamin (VITAMIN B-12) 1000 MCG tablet Take 1,000 mcg by mouth in the morning.      fenofibrate (TRICOR) 145 mg tablet Take 1 tablet (145 mg total) by mouth daily 90 tablet 3    ferrous sulfate 324 (65 Fe) mg Take 1 tablet (324 mg total) by mouth daily before breakfast 30 tablet 5    folic acid (FOLVITE) 1 mg tablet Take 1 tablet (1 mg total) by mouth daily 30 tablet 5    Melatonin 5 MG TABS Take by mouth daily at bedtime      methotrexate 2.5 MG tablet Take 6 tablets (15 mg total) by mouth once a week 72 tablet 2    oxybutynin (DITROPAN) 5 mg tablet Take 1 tablet (5 mg total) by mouth every 6 (six) hours as needed (bladder spasms) 30 tablet 0    tamsulosin (FLOMAX) 0.4 mg Take 1 capsule (0.4 mg total) by mouth daily with dinner 90 capsule 3    ursodiol (ACTIGALL) 300 mg capsule TAKE 3 CAPSULES (900 MG TOTAL) BY MOUTH IN THE MORNING 90 capsule 11    acetaminophen (TYLENOL) 325 mg tablet Take 2 tablets (650 mg total) by mouth every 4 (four) hours as needed for mild pain (Patient not taking: Reported on 6/4/2025)      hydrocortisone (ANUSOL-HC) 2.5 % rectal cream Apply topically 2 (two) times a day for 5 days 28 g 0     No current facility-administered medications on file prior to visit.   [6]   Social History  Tobacco Use    Smoking status: Never     Passive exposure: Past    Smokeless tobacco: Never    Tobacco comments:     Never a smoker or use of any tobacco products per pt    Vaping Use    Vaping status: Never Used   Substance and Sexual Activity    Alcohol use: Never     Comment: Denies any alcohol use per pt    Drug use: Never     Comment: Denies any drug use per pt    Sexual activity: Not Currently     Partners: Male     Birth  control/protection: Abstinence, Post-menopausal     Comment: Not active per pt

## 2025-06-04 NOTE — ASSESSMENT & PLAN NOTE
Psychosocial   Supportive listening provided  Normalized experience of patient/family  Provided anxiety containment     Referrals Placed / Medical Equipment Ordered  -None    Follow-Up Recommendations  -Follow-up with PCP and current medical specialists  -Follow-up with palliative care: 3-4 months or as needed

## 2025-06-04 NOTE — ASSESSMENT & PLAN NOTE
Ongoing follow-up with her specialists.  Patient following GI for Hepatic granuloma with plans for Ultrasound

## 2025-06-05 NOTE — PRE-PROCEDURE INSTRUCTIONS
Pre-Surgery Instructions:   Medication Instructions    acetaminophen (TYLENOL) 325 mg tablet Uses PRN- OK to take day of surgery    cyanocobalamin (VITAMIN B-12) 1000 MCG tablet Stop taking 7 days prior to surgery.    fenofibrate (TRICOR) 145 mg tablet Take as directed    ferrous sulfate 324 (65 Fe) mg Stop taking 7 days prior to surgery.    folic acid (FOLVITE) 1 mg tablet Stop taking 7 days prior to surgery.    Melatonin 5 MG TABS Take night before surgery    methotrexate 2.5 MG tablet Take as directed    tamsulosin (FLOMAX) 0.4 mg Take night before surgery    ursodiol (ACTIGALL) 300 mg capsule Take as directed        Medication instructions for day of surgery reviewed. Please take all instructed medications with only a sip of water. Please do not take any over the counter (non-prescribed) vitamins or supplements for one week prior to date of surgery.      You will receive a call one business day prior to surgery with an arrival time and hospital directions. If your surgery is scheduled on a Monday, the hospital will be calling you on the Friday prior to your surgery. If you have not heard from anyone by 8pm, please call the hospital supervisor through the hospital  at [phone]. ([place] [phone] or [place] [phone]).    Do not eat or drink anything after midnight the night before your surgery, including candy, mints, lifesavers, or chewing gum. Do not drink alcohol 24hrs before your surgery. Try not to smoke at least 24hrs before your surgery.       Follow the pre surgery showering instructions as listed in the “My Surgical Experience Booklet” or otherwise provided by your surgeon's office. Do not use a blade to shave the surgical area 1 week before surgery. It is okay to use a clean electric clippers up to 24 hours before surgery. Do not apply any lotions, creams, including makeup, cologne, deodorant, or perfumes after showering on the day of your surgery. Do not use dry shampoo, hair  spray, hair gel, or any type of hair products.     No contact lenses, eye make-up, or artificial eyelashes. Remove nail polish, including gel polish, and any artificial, gel, or acrylic nails if possible. Remove all jewelry including rings and body piercing jewelry.     Wear causal clothing that is easy to take on and off. Consider your type of surgery.    Keep any valuables, jewelry, piercings at home. Please bring any specially ordered equipment (sling, braces) if indicated.    Arrange for a responsible person to drive you to and from the hospital on the day of your surgery. Please confirm the visitor policy for the day of your procedure when you receive your phone call with an arrival time.     Call the surgeon's office with any new illnesses, exposures, or additional questions prior to surgery.    Please reference your “My Surgical Experience Booklet” for additional information to prepare for your upcoming surgery.

## 2025-06-12 ENCOUNTER — OFFICE VISIT (OUTPATIENT)
Dept: INTERNAL MEDICINE CLINIC | Facility: CLINIC | Age: 60
End: 2025-06-12
Payer: COMMERCIAL

## 2025-06-12 VITALS
DIASTOLIC BLOOD PRESSURE: 70 MMHG | WEIGHT: 117 LBS | OXYGEN SATURATION: 100 % | BODY MASS INDEX: 20.73 KG/M2 | SYSTOLIC BLOOD PRESSURE: 114 MMHG | HEART RATE: 86 BPM

## 2025-06-12 DIAGNOSIS — G50.0 TRIGEMINAL NEURALGIA OF LEFT SIDE OF FACE: ICD-10-CM

## 2025-06-12 DIAGNOSIS — R51.9 LEFT FACIAL PAIN: Primary | ICD-10-CM

## 2025-06-12 DIAGNOSIS — K08.59 LOSS OF FILLING FROM ACCESS HOLE OF TOOTH: ICD-10-CM

## 2025-06-12 PROCEDURE — 99213 OFFICE O/P EST LOW 20 MIN: CPT | Performed by: INTERNAL MEDICINE

## 2025-06-12 RX ORDER — GABAPENTIN 300 MG/1
300 CAPSULE ORAL
Qty: 30 CAPSULE | Refills: 0 | Status: SHIPPED | OUTPATIENT
Start: 2025-06-12

## 2025-06-12 NOTE — PROGRESS NOTES
Name: Danielle Haque      : 1965      MRN: [id number]  Encounter Provider: Eusebio Quiroz MD  Encounter Date: 2025   [facility]  :  Assessment & Plan  Left facial pain  Pain possibly secondary to trigeminal neuralgia.  An MRI of the brain has been ordered for further eval of the trigeminal nerve and to rule out any potential compressing lesions.  She will also be given a trial of gabapentin starting with a dose of 300 mg nightly for nerve pain.  A referral has also been made to neurology for further evaluation.  Patient to follow-up with PCP in 2 weeks.  Orders:    gabapentin (Neurontin) 300 mg capsule; Take 1 capsule (300 mg total) by mouth daily at bedtime    MRI brain w wo contrast; Future    Ambulatory Referral to Neurology; Future    Trigeminal neuralgia of left side of face  Orders:    MRI brain w wo contrast; Future    Loss of filling from access hole of tooth  Facial pain possibly related to nerve root irritation involving tooth.  Recommended she get into see a dentist as soon as possible to have her tooth addressed.              History of Present Illness   HPI  Patient presents today as an acute visit.  She complains of severe sharp left sided facial pain over the past 2 weeks.  She states the pain radiates from her ear into her jaw and across her left cheek.  She rates her pain at a 7 out of 10. Of note, patient reports a large filling fell out from her back left molar 3 weeks ago.  She denies any pain with chewing or sensitivity to hot or cold.  She reports her pain has only been present at night.  Throughout the day she is asymptomatic.  For pain she states she has tried taking Tylenol and oxycodone with minimal relief.  She denies any facial paresthesias.    Review of Systems  All other systems negative except for pertinent findings noted in HPI.     Objective   /70 (BP Location: Left arm, Patient Position: Sitting, Cuff Size:  Standard)   Pulse 86   Wt 53.1 kg (117 lb)   SpO2 100%   BMI 20.73 kg/m²      Physical Exam  General: NAD  HEENT: NCAT, EOMI, normal conjunctiva, left and right TMs normal in appearance, absence of large filling in back left molar  Cardiovascular: RRR, normal S1 and S2, no m/r/g  Pulmonary: Normal respiratory effort, no wheezes, rales or rhonchi  Extremities: No lower extremity edema  Skin: Normal skin color, no rashes   Psychiatric: Normal mood and affect

## 2025-06-13 ENCOUNTER — APPOINTMENT (OUTPATIENT)
Dept: RADIOLOGY | Facility: AMBULARY SURGERY CENTER | Age: 60
End: 2025-06-13
Payer: COMMERCIAL

## 2025-06-13 ENCOUNTER — ANESTHESIA (OUTPATIENT)
Dept: PERIOP | Facility: AMBULARY SURGERY CENTER | Age: 60
End: 2025-06-13
Payer: COMMERCIAL

## 2025-06-13 ENCOUNTER — HOSPITAL ENCOUNTER (OUTPATIENT)
Facility: AMBULARY SURGERY CENTER | Age: 60
Setting detail: OUTPATIENT SURGERY
Discharge: HOME | End: 2025-06-13
Attending: UROLOGY | Admitting: UROLOGY
Payer: COMMERCIAL

## 2025-06-13 ENCOUNTER — TELEPHONE (OUTPATIENT)
Dept: UROLOGY | Facility: CLINIC | Age: 60
End: 2025-06-13

## 2025-06-13 VITALS
BODY MASS INDEX: 20.98 KG/M2 | RESPIRATION RATE: 18 BRPM | WEIGHT: 114 LBS | HEART RATE: 78 BPM | OXYGEN SATURATION: 100 % | DIASTOLIC BLOOD PRESSURE: 69 MMHG | HEIGHT: 62 IN | TEMPERATURE: 98.1 F | SYSTOLIC BLOOD PRESSURE: 162 MMHG

## 2025-06-13 DIAGNOSIS — N13.1 HYDRONEPHROSIS DUE TO URETERAL STRICTURE: Primary | ICD-10-CM

## 2025-06-13 PROCEDURE — C1769 GUIDE WIRE: HCPCS | Performed by: UROLOGY

## 2025-06-13 PROCEDURE — 52332 CYSTOSCOPY AND TREATMENT: CPT | Performed by: UROLOGY

## 2025-06-13 PROCEDURE — 52344 CYSTO/URETERO STRICTURE TX: CPT | Performed by: UROLOGY

## 2025-06-13 PROCEDURE — 74420 UROGRAPHY RTRGR +-KUB: CPT

## 2025-06-13 PROCEDURE — C1894 INTRO/SHEATH, NON-LASER: HCPCS | Performed by: UROLOGY

## 2025-06-13 PROCEDURE — C1758 CATHETER, URETERAL: HCPCS | Performed by: UROLOGY

## 2025-06-13 PROCEDURE — NC001 PR NO CHARGE: Performed by: UROLOGY

## 2025-06-13 PROCEDURE — C2625 STENT, NON-COR, TEM W/DEL SY: HCPCS | Performed by: UROLOGY

## 2025-06-13 DEVICE — STENT URETERAL 6FR 22CM INLAY OPTIMA W/NITINOL GDWR: Type: IMPLANTABLE DEVICE | Status: FUNCTIONAL

## 2025-06-13 RX ORDER — DOCUSATE SODIUM 100 MG/1
100 CAPSULE, LIQUID FILLED ORAL 2 TIMES DAILY
Qty: 30 CAPSULE | Refills: 0 | Status: SHIPPED | OUTPATIENT
Start: 2025-06-13 | End: 2025-06-28

## 2025-06-13 RX ORDER — TAMSULOSIN HYDROCHLORIDE 0.4 MG/1
0.4 CAPSULE ORAL
Qty: 15 CAPSULE | Refills: 0 | Status: SHIPPED | OUTPATIENT
Start: 2025-06-13

## 2025-06-13 RX ORDER — MAGNESIUM HYDROXIDE 1200 MG/15ML
LIQUID ORAL AS NEEDED
Status: DISCONTINUED | OUTPATIENT
Start: 2025-06-13 | End: 2025-06-13 | Stop reason: HOSPADM

## 2025-06-13 RX ORDER — CEFAZOLIN SODIUM 2 G/50ML
2000 SOLUTION INTRAVENOUS ONCE
Status: DISCONTINUED | OUTPATIENT
Start: 2025-06-13 | End: 2025-06-13

## 2025-06-13 RX ORDER — OXYCODONE HYDROCHLORIDE 5 MG/1
5 TABLET ORAL EVERY 4 HOURS PRN
Qty: 5 TABLET | Refills: 0 | Status: SHIPPED | OUTPATIENT
Start: 2025-06-13 | End: 2025-06-18

## 2025-06-13 RX ORDER — PROPOFOL 10 MG/ML
INJECTION, EMULSION INTRAVENOUS AS NEEDED
Status: DISCONTINUED | OUTPATIENT
Start: 2025-06-13 | End: 2025-06-13

## 2025-06-13 RX ORDER — PHENAZOPYRIDINE HYDROCHLORIDE 200 MG/1
200 TABLET, FILM COATED ORAL 3 TIMES DAILY PRN
Qty: 10 TABLET | Refills: 0 | Status: SHIPPED | OUTPATIENT
Start: 2025-06-13 | End: 2025-06-16

## 2025-06-13 RX ORDER — PHENYLEPHRINE HCL IN 0.9% NACL 1 MG/10 ML
SYRINGE (ML) INTRAVENOUS AS NEEDED
Status: DISCONTINUED | OUTPATIENT
Start: 2025-06-13 | End: 2025-06-13

## 2025-06-13 RX ORDER — MIDAZOLAM HYDROCHLORIDE 2 MG/2ML
INJECTION, SOLUTION INTRAMUSCULAR; INTRAVENOUS AS NEEDED
Status: DISCONTINUED | OUTPATIENT
Start: 2025-06-13 | End: 2025-06-13

## 2025-06-13 RX ORDER — METOCLOPRAMIDE HYDROCHLORIDE 5 MG/ML
10 INJECTION INTRAMUSCULAR; INTRAVENOUS ONCE AS NEEDED
Status: DISCONTINUED | OUTPATIENT
Start: 2025-06-13 | End: 2025-06-13 | Stop reason: HOSPADM

## 2025-06-13 RX ORDER — CIPROFLOXACIN 2 MG/ML
INJECTION, SOLUTION INTRAVENOUS CONTINUOUS PRN
Status: DISCONTINUED | OUTPATIENT
Start: 2025-06-13 | End: 2025-06-13

## 2025-06-13 RX ORDER — LIDOCAINE HYDROCHLORIDE 10 MG/ML
INJECTION, SOLUTION EPIDURAL; INFILTRATION; INTRACAUDAL; PERINEURAL AS NEEDED
Status: DISCONTINUED | OUTPATIENT
Start: 2025-06-13 | End: 2025-06-13

## 2025-06-13 RX ORDER — SODIUM CHLORIDE, SODIUM LACTATE, POTASSIUM CHLORIDE, CALCIUM CHLORIDE 600; 310; 30; 20 MG/100ML; MG/100ML; MG/100ML; MG/100ML
INJECTION, SOLUTION INTRAVENOUS CONTINUOUS PRN
Status: DISCONTINUED | OUTPATIENT
Start: 2025-06-13 | End: 2025-06-13

## 2025-06-13 RX ORDER — FENTANYL CITRATE 50 UG/ML
INJECTION, SOLUTION INTRAMUSCULAR; INTRAVENOUS AS NEEDED
Status: DISCONTINUED | OUTPATIENT
Start: 2025-06-13 | End: 2025-06-13

## 2025-06-13 RX ORDER — OXYCODONE HYDROCHLORIDE 5 MG/1
10 TABLET ORAL EVERY 4 HOURS PRN
Refills: 0 | Status: DISCONTINUED | OUTPATIENT
Start: 2025-06-13 | End: 2025-06-13 | Stop reason: HOSPADM

## 2025-06-13 RX ORDER — DIPHENHYDRAMINE HYDROCHLORIDE 50 MG/ML
INJECTION, SOLUTION INTRAMUSCULAR; INTRAVENOUS AS NEEDED
Status: DISCONTINUED | OUTPATIENT
Start: 2025-06-13 | End: 2025-06-13

## 2025-06-13 RX ORDER — DEXAMETHASONE SODIUM PHOSPHATE 10 MG/ML
INJECTION, SOLUTION INTRAMUSCULAR; INTRAVENOUS AS NEEDED
Status: DISCONTINUED | OUTPATIENT
Start: 2025-06-13 | End: 2025-06-13

## 2025-06-13 RX ORDER — HYDROMORPHONE HCL/PF 1 MG/ML
0.5 SYRINGE (ML) INJECTION EVERY 2 HOUR PRN
Refills: 0 | Status: DISCONTINUED | OUTPATIENT
Start: 2025-06-13 | End: 2025-06-13 | Stop reason: HOSPADM

## 2025-06-13 RX ORDER — NAPROXEN 500 MG/1
500 TABLET ORAL 2 TIMES DAILY WITH MEALS
Qty: 20 TABLET | Refills: 0 | Status: SHIPPED | OUTPATIENT
Start: 2025-06-13 | End: 2025-06-23

## 2025-06-13 RX ORDER — ONDANSETRON 2 MG/ML
4 INJECTION INTRAMUSCULAR; INTRAVENOUS ONCE AS NEEDED
Status: DISCONTINUED | OUTPATIENT
Start: 2025-06-13 | End: 2025-06-13 | Stop reason: HOSPADM

## 2025-06-13 RX ORDER — FENTANYL CITRATE/PF 50 MCG/ML
50 SYRINGE (ML) INJECTION
Status: DISCONTINUED | OUTPATIENT
Start: 2025-06-13 | End: 2025-06-13 | Stop reason: HOSPADM

## 2025-06-13 RX ORDER — OXYCODONE HYDROCHLORIDE 5 MG/1
5 TABLET ORAL EVERY 4 HOURS PRN
Refills: 0 | Status: DISCONTINUED | OUTPATIENT
Start: 2025-06-13 | End: 2025-06-13 | Stop reason: HOSPADM

## 2025-06-13 RX ORDER — ONDANSETRON 2 MG/ML
INJECTION INTRAMUSCULAR; INTRAVENOUS AS NEEDED
Status: DISCONTINUED | OUTPATIENT
Start: 2025-06-13 | End: 2025-06-13

## 2025-06-13 RX ADMIN — FENTANYL CITRATE 25 MCG: 50 INJECTION INTRAMUSCULAR; INTRAVENOUS at 09:52

## 2025-06-13 RX ADMIN — FENTANYL CITRATE 25 MCG: 50 INJECTION INTRAMUSCULAR; INTRAVENOUS at 10:22

## 2025-06-13 RX ADMIN — DIPHENHYDRAMINE HYDROCHLORIDE 25 MG: 50 INJECTION, SOLUTION INTRAMUSCULAR; INTRAVENOUS at 09:44

## 2025-06-13 RX ADMIN — CIPROFLOXACIN: 400 INJECTION, SOLUTION INTRAVENOUS at 09:50

## 2025-06-13 RX ADMIN — FENTANYL CITRATE 25 MCG: 50 INJECTION INTRAMUSCULAR; INTRAVENOUS at 10:15

## 2025-06-13 RX ADMIN — LIDOCAINE HYDROCHLORIDE 50 MG: 10 INJECTION, SOLUTION EPIDURAL; INFILTRATION; INTRACAUDAL at 09:44

## 2025-06-13 RX ADMIN — FENTANYL CITRATE 25 MCG: 50 INJECTION INTRAMUSCULAR; INTRAVENOUS at 10:08

## 2025-06-13 RX ADMIN — DEXAMETHASONE SODIUM PHOSPHATE 10 MG: 10 INJECTION INTRAMUSCULAR; INTRAVENOUS at 09:45

## 2025-06-13 RX ADMIN — MIDAZOLAM 2 MG: 1 INJECTION INTRAMUSCULAR; INTRAVENOUS at 09:36

## 2025-06-13 RX ADMIN — SODIUM CHLORIDE, SODIUM LACTATE, POTASSIUM CHLORIDE, AND CALCIUM CHLORIDE: .6; .31; .03; .02 INJECTION, SOLUTION INTRAVENOUS at 09:34

## 2025-06-13 RX ADMIN — Medication 50 MCG: at 10:05

## 2025-06-13 RX ADMIN — OXYCODONE HYDROCHLORIDE 5 MG: 5 TABLET ORAL at 11:51

## 2025-06-13 RX ADMIN — ONDANSETRON 4 MG: 2 INJECTION, SOLUTION INTRAMUSCULAR; INTRAVENOUS at 09:36

## 2025-06-13 RX ADMIN — FENTANYL CITRATE 50 MCG: 50 INJECTION INTRAMUSCULAR; INTRAVENOUS at 11:06

## 2025-06-13 RX ADMIN — Medication 100 MCG: at 09:56

## 2025-06-13 RX ADMIN — DEXAMETHASONE SODIUM PHOSPHATE 10 MG: 10 INJECTION INTRAMUSCULAR; INTRAVENOUS at 09:44

## 2025-06-13 RX ADMIN — PROPOFOL 150 MG: 10 INJECTION, EMULSION INTRAVENOUS at 09:44

## 2025-06-13 NOTE — ANESTHESIA POSTPROCEDURE EVALUATION
Post-Op Assessment Note    CV Status:  Stable  Pain Score: 0    Pain management: adequate       Mental Status:  Sleepy and arousable   PONV Controlled:  None   Airway Patency:  Patent     Post Op Vitals Reviewed: Yes    No anethesia notable event occurred.    Staff: CRNA           Last Filed PACU Vitals:  Vitals Value Taken Time   Temp 97.9    Pulse 82 06/13/25 10:44   /65 06/13/25 10:44   Resp 10 06/13/25 10:44   SpO2 100 % 06/13/25 10:44   Vitals shown include unfiled device data.

## 2025-06-13 NOTE — H&P
"UROLOGY HISTORY AND PHYSICAL     Patient Identifiers: Danielle Haque (MRN [id number])      Date of Service: 6/13/2025        ASSESSMENT:     59 y.o. old female with complex bilateral stone and ureteral stricture disease..      PLAN:       Procedure:  CYSTOSCOPY URETEROSCOPY WITH LITHOTRIPSY HOLMIUM LASER, balloon dilation of ureteral stricture, RETROGRADE PYELOGRAM AND INSERTION STENT URETERAL, right ureteral stent exchange (Left: Bladder)  EXCHANGE STENT URETERAL (Right: Ureter)  DILATATION URETHRAL (Bladder)              History of Present Illness:     Danielle Haque is a 59 y.o. old with a history of complex bilateral stone and ureteral stricture disease    Past Medical, Past Surgical History:   Past Medical History[1]:    Past Surgical History[2]:    Medications, Allergies:   Current Medications[3]    Allergies:  Allergies[4]:    Social and Family History:   Social History:   Social History[5].    Tobacco Use History[6]    Family History:  Family History[7]:     Review of Systems:     General: Fever, chills, or night sweats: negative  Cardiac: Negative for chest pain.    Pulmonary: Negative for shortness of breath.  Gastrointestinal: Abdominal pain negative  Nausea, vomiting, or diarrhea negative  Genitourinary: See HPI above.  Patient does nothave hematuria.  All other systems queried were negative.    Physical Exam:   General: Patient is pleasant and in NAD. Awake and alert  /67   Pulse 83   Temp 98 °F (36.7 °C) (Temporal)   Resp 18   Ht 5' 2\" (1.575 m)   Wt 51.7 kg (114 lb)   SpO2 100%   BMI 20.85 kg/m²   HEENT:  Normocephalic atraumatic  Cardiac:  Regular rate and rhythm, Peripheral edema: negative  Pulmonary: Non-labored breathing, CTAB  Abdomen: Soft, non-tender, non-distended.  No surgical scars.  No masses, tenderness, hernias noted.    Genitourinary: negative CVA tenderness, neg suprapubic tenderness.  Extremities: normal movement in all 4       Labs:     Lab Results   Component Value Date "    HGB 9.7 (L) 05/23/2025    HCT 29.0 (L) 05/23/2025    WBC 4.50 05/23/2025     05/23/2025   ]    Lab Results   Component Value Date    K 3.6 04/12/2025     04/12/2025    CO2 24 04/12/2025    BUN 30 (H) 04/12/2025    CREATININE 1.55 (H) 04/12/2025    CALCIUM 9.9 04/12/2025   ]    Imaging:   I personally reviewed the images and report of the following studies, and reviewed them with the patient:        Thank you for allowing me to participate in this patients’ care.  Please do not hesitate to call with any additional questions.  José Luis Stephens MD           [1]   Past Medical History:  Diagnosis Date    Anemia     Bleeding from colostomy stoma (HCC) 02/11/2023     states ileostomy , not colostomy    Cancer (HCC)     Chronic kidney disease     Colon cancer (HCC)     Cyst (solitary) of breast, left     Dermoid cyst of neck     RIGHT    Dermoid cyst of skin of back     LEFT    Elevated serum creatinine 09/11/2024    Fall     Headache     Hemochromatosis, unspecified     History of transfusion     within 2 months 4/2/2025    Hyperlipidemia     Hypokalemia 09/12/2024    Kidney calculi     Kidney stone     Liver disease     hemangioma    Muscle weakness     Personal history of COVID-19 12/2022    Sarcoidosis     Seizures (HCC)     2022-- one seizure at that time - no further seizures at this time    Shingles     Ureteral stent present    [2]   Past Surgical History:  Procedure Laterality Date    ABSCESS DRAINAGE      left breast    BREAST BIOPSY      CHEST TUBE INSERTION      COLON SURGERY      colostomy    COLONOSCOPY      FACIAL/NECK BIOPSY Right 05/01/2025    Procedure: EXCISION OF DERMAL CYST RIGHT NECK WITH COMPLEX CLOSURE;  Surgeon: Sincere Johnston MD;  Location:  MAIN OR;  Service: Plastics    FL GUIDED CENTRAL VENOUS ACCESS DEVICE INSERTION  03/21/2023    FL RETROGRADE PYELOGRAM  01/23/2023    FL RETROGRADE PYELOGRAM  04/03/2023    FL RETROGRADE PYELOGRAM  07/21/2023    FL RETROGRADE  PYELOGRAM  10/11/2023    FL RETROGRADE PYELOGRAM  12/15/2023    FL RETROGRADE PYELOGRAM  02/08/2024    FL RETROGRADE PYELOGRAM  05/10/2024    FL RETROGRADE PYELOGRAM  09/26/2024    FL RETROGRADE PYELOGRAM  10/18/2024    FL RETROGRADE PYELOGRAM  01/17/2025    FL RETROGRADE PYELOGRAM  04/11/2025    ILEOSTOMY      IR BIOPSY LIVER MASS  05/09/2023    LUNG BIOPSY      VA CYSTO W/INSERT URETERAL STENT Bilateral 05/10/2024    Procedure: EXCHANGE STENT URETERAL;  Surgeon: José Luis Stephens MD;  [facility];  Service: Urology    VA CYSTO W/INSERT URETERAL STENT Bilateral 10/18/2024    Procedure: EXCHANGE STENT URETERAL;  Surgeon: José Luis Stephens MD;  [facility];  Service: Urology    VA CYSTO W/INSERT URETERAL STENT Bilateral 04/11/2025    Procedure: EXCHANGE STENT URETERAL;  Surgeon: José Luis Stephens MD;  [facility];  Service: Urology    VA CYSTO/URETERO W/LITHOTRIPSY &INDWELL STENT INSRT Bilateral 01/23/2023    Procedure: CYSTOSCOPY  RIGHT URETEROSCOPY WITH LITHOTRIPSY HOLMIUM LASER, BILATERAL RETROGRADE PYELOGRAM AND BILATERAL  URETERAL STENT INSERTION;  Surgeon: José Luis Stephens MD;  [facility];  Service: Urology    VA CYSTO/URETERO W/LITHOTRIPSY &INDWELL STENT INSRT Right 04/03/2023    Procedure: RIGHT URETEROSCOPY WITH LITHOTRIPSY HOLMIUM LASER, RETROGRADE PYELOGRAM; BILATERAL EXCHANGE STENT URETERAL;  Surgeon: José Luis Stephens MD;  [facility];  Service: Urology    VA CYSTO/URETERO W/LITHOTRIPSY &INDWELL STENT INSRT Bilateral 10/11/2023    Procedure: CYSTOSCOPY URETEROSCOPY RETROGRADE PYELOGRAM AND INSERTION STENT URETERAL DIAGNOSTINC RIGHT URETEROSCOPY, REMOVAL STENT LEFT SIDE;  Surgeon: José Luis Stephens MD;  [facility];  Service: Urology    VA CYSTO/URETERO W/LITHOTRIPSY &INDWELL STENT INSRT Bilateral 09/26/2024    Procedure: CYSTOSCOPY ,LEFT  URETEROSCOPY,  BILATERAL  RETROGRADE PYELOGRAM AND BILATERAL URETERAL STENT  EXCHANGE;  Surgeon: Favian Barber MD;  [facility];  Service: Urology    SD CYSTO/URETERO W/LITHOTRIPSY &INDWELL STENT INSRT Left 10/18/2024    Procedure: CYSTOSCOPY LEFT URETEROSCOPY, treatment of LEFT ureteral stricture, RETROGRADE PYELOGRAM AND BILATERAL STENT EXCHANGE;  Surgeon: José Luis Stephens MD;  [facility];  Service: Urology    SD CYSTO/URETERO W/LITHOTRIPSY &INDWELL STENT INSRT Left 04/11/2025    Procedure: cysto/ L. RGP/ L. URS with lithotripsy and L. stent insertion;  Surgeon: José Luis Stephens MD;  [facility];  Service: Urology    SD CYSTOURETHROSCOPY W/URETERAL CATHETERIZATION Bilateral 07/21/2023    Procedure: CYSTOSCOPY URETEROSCOPY RETROGRADE PYELOGRAM WITH BILATERAL STENT URETERAL EXCHANGE; LEFT LASER LITHOTRIPSY AND STONE BASKET RETRIEVAL;  Surgeon: José Luis Stephens MD;  [facility];  Service: Urology    SD CYSTOURETHROSCOPY W/URETERAL CATHETERIZATION Bilateral 12/15/2023    Procedure: CYSTOSCOPY, BILATERAL  RETROGRADE PYELOGRAM , STENT EXCHANGE RIGHT , LEFT URETEROSCOPY ,  HOLMIUM LASER WITH INSERTION LEFT URETERAL STENT;  Surgeon: Derick Bhakta MD;  [facility];  Service: Urology    SD CYSTOURETHROSCOPY W/URETERAL CATHETERIZATION Left 02/08/2024    Procedure: CYSTOSCOPY B/L RETROGRADE PYELOGRAM WITH B/L T URETERALSTENT EXCHANGE,LEFT URETEROSCOPY, DILATION LEFT URETERAL STICTURE;  Surgeon: José Luis Stephens MD;  [facility];  Service: Urology    SD CYSTOURETHROSCOPY W/URETERAL CATHETERIZATION Bilateral 05/10/2024    Procedure: CYSTOSCOPY RETROGRADE PYELOGRAM WITH INSERTION STENT URETERAL;  Surgeon: José Luis Stephens MD;  [facility];  Service: Urology    SD CYSTOURETHROSCOPY W/URETERAL CATHETERIZATION Right 01/17/2025    Procedure: CYSTOSCOPY RETROGRADE PYELOGRAM WITH INSERTION STENT URETERAL, LEFT RETROGRADE PYELOGRAM AND STENT REMOVAL;  Surgeon: José Luis Stephens MD;  [facility];  Service: Urology    NM CYSTOURETHROSCOPY W/URETERAL CATHETERIZATION Bilateral 04/11/2025    Procedure: CYSTOSCOPY RETROGRADE PYELOGRAM WITH INSERTION STENT URETERAL;  Surgeon: José Luis Stephens MD;  [facility];  Service: Urology    NM INSJ TUNNELED CTR VAD W/SUBQ PORT AGE 5 YR/> N/A 03/21/2023    Procedure: INSERTION VENOUS PORT ( PORT-A-CATH) IR;  Surgeon: Mark Amos DO;  [facility];  Service: Interventional Radiology    NM LAPS COLECTOMY PRTL W/COLOPXTSTMY LW ANAST N/A 08/17/2023    Procedure: RESECTION COLON LOW ANTERIOR LAPAROSCOPIC WITH ROBOTICS;  Surgeon: Sree Umanzor MD;  [facility];  Service: Colorectal    SKIN LESION EXCISION Left 05/01/2025    Procedure: EXCISION OF DERMAL CYSTS LEFT BREAST, LEFT BACK WITH COMPLEX CLOSURE.;  Surgeon: Sincere Johnston MD;  Location: [facility];  Service: Plastics    TONSILLECTOMY      URINARY SURGERY Bilateral     bilateral stents   [3]   Current Facility-Administered Medications:     ceFAZolin (ANCEF) IVPB (premix in dextrose) 2,000 mg 50 mL, 2,000 mg, Intravenous, Once, José Luis Stephens MD  [4]   Allergies  Allergen Reactions    Oxaliplatin Shortness Of Breath     Reactions occurred with 2nd and 3rd infusions (about 1-3 hours from initiation of infusion) and required treatment with steroids and antihistamines. Please refer to allergy note on 2/7/2023 for detailed description of her reactions.    Morphine Nausea Only     \"Every dose made me nauseous\" (oral dosing only, can tolerate IV morphine)    Potassium Chloride Other (See Comments)     Pt reports \"burning\" with Iv potassium administration and wishes for it to be added to chart   [5]   Social History  Tobacco Use    Smoking status: Never     Passive exposure: Past    Smokeless tobacco: Never    Tobacco comments:     Never a smoker or use of any tobacco products per pt    Vaping Use    Vaping status: Never Used   Substance Use Topics    Alcohol use: " Never     Comment: Denies any alcohol use per pt    Drug use: Never     Comment: Denies any drug use per pt   [6]   Social History  Tobacco Use   Smoking Status Never    Passive exposure: Past   Smokeless Tobacco Never   Tobacco Comments    Never a smoker or use of any tobacco products per pt    [7]   Family History  Problem Relation Name Age of Onset    Cancer Mother Saar     Cirrhosis Father none     Diabetes Father none     Breast cancer Neg Hx      Breast cancer additional onset Neg Hx      Anesthesia problems Neg Hx

## 2025-06-13 NOTE — DISCHARGE INSTR - AVS FIRST PAGE
Danielle Haque:    Today you underwent bilateral ureteral stent exchange.  I did perform left ureteroscopy to evaluate your left ureter for balloon dilation to treat the stricture disease    Unfortunately the stricture, or scar tissue, and the left side has become too firm and fixed and long and I do not believe that this side is treatable with minimally invasive scope surgery.  This is a identical finding to your other right side which we have known about for some time    Options at this point going forward include the following:  - Continue with regular ureteral stent exchanges every 4 months or so  - Referral to one of my colleagues to discuss ureteral reconstruction, which would be a very complex intra-abdominal surgical procedure.    Please take your medications as prescribed with caution for comfort.  Most importantly please drink 6-8 glasses of water per day    Please call with any questions or concerns.    José Luis Stephens MD,PhD  [facility]  [phone]            WHAT IS A STENT?  At the end of the procedure, your doctor may place a stent into your ureter. A stent is a thin, flexible piece of plastic that will hold open your ureter while the remaining small pieces of stone pass. This allows your kidney to drain easily and prevents you from having to “pass” these small stone pieces on your own, which could be painful. The stent is about 12 inches long and looks and feels like a thin piece of spaghetti.    AFTER THE PROCEDURE  After the procedure you may experience the following symptoms. All of these are normal and should resolve within 1 or 2 days after your stent is removed.  Urinary frequency (urinating more often than usual)  Urinary urgency (the sensation that you need to urinate right away)  Painful urination (this can be pain in your bladder or in your back when  you urinate)  Blood in your urine ( a stent can irritate the lining of your bladder causing it to  bleed)  Back/Flank pain, especially with urination  You will receive a prescription for narcotic pain medication after the procedure. You will also receive a prescription for tamsulosin which you will take once a day for 2 weeks to help relax your ureter and decrease stent discomfort. You will also need to purchase a stool softener (i.e. Colace) or mild laxative (i.e. Miralax) as the narcotic pain medication can make you constipated. This is important as constipation can exacerbate stent related symptoms.

## 2025-06-13 NOTE — ANESTHESIA PREPROCEDURE EVALUATION
Procedure:  CYSTOSCOPY URETEROSCOPY WITH LITHOTRIPSY HOLMIUM LASER, balloon dilation of ureteral stricture, RETROGRADE PYELOGRAM AND INSERTION STENT URETERAL, right ureteral stent exchange (Left: Bladder)  EXCHANGE STENT URETERAL (Right: Ureter)  DILATATION URETHRAL (Bladder)    Relevant Problems   CARDIO   (+) Hemorrhoid   (+) Hemorrhoid prolapse   (+) Hypertension   (+) Nonrheumatic mitral valve regurgitation      GI/HEPATIC   (+) History of rectal cancer (HCC)   (+) Malignant neoplasm of sigmoid colon (HCC)   (+) Partial small bowel obstruction (HCC)      /RENAL   (+) THOR (acute kidney injury) (HCC)   (+) Benign hypertension with chronic kidney disease, stage III (HCC)   (+) Bilateral hydronephrosis   (+) CKD stage G3a/A2, GFR 45-59 and albumin creatinine ratio  mg/g (HCC)   (+) CKD stage G3a/A3, GFR 45-59 and albumin creatinine ratio >300 mg/g (HCC)   (+) Chronic kidney disease-mineral bone disorder (CKD-MBD) with stage 3a chronic kidney disease (HCC)   (+) Hydronephrosis due to ureteral stricture   (+) Nephrolithiasis      HEMATOLOGY   (+) Iron deficiency anemia   (+) Pancytopenia (HCC)      MUSCULOSKELETAL   (+) Chronic back pain      NEURO/PSYCH   (+) Anxiety about health   (+) Chronic back pain   (+) Chronic pain after cancer treatment        Physical Exam    Airway     Mallampati score: II  TM Distance: >3 FB  Neck ROM: full      Cardiovascular      Dental       Pulmonary      Neurological      Other Findings  post-pubertal.      Anesthesia Plan  ASA Score- 3     Anesthesia Type- general with ASA Monitors.         Additional Monitors:     Airway Plan: LMA and LMA.           Plan Factors-    Chart reviewed.                      Induction-     Postoperative Plan- .   Monitoring Plan - Monitoring plan - standard ASA monitoring          Informed Consent- Anesthetic plan and risks discussed with patient.  I personally reviewed this patient with the CRNA. Discussed and agreed on the Anesthesia Plan with  the CRNA..      NPO Status:  Vitals Value Taken Time   Date of last liquid 06/12/25 06/13/25 07:54   Time of last liquid 1900 06/13/25 07:54   Date of last solid 06/12/25 06/13/25 07:54   Time of last solid 1900 06/13/25 07:54

## 2025-06-13 NOTE — TELEPHONE ENCOUNTER
Patient is status post ureteroscopy today.  Her strictures have worsened.  At this point the plan is for chronic ureteral stent exchanges    Luis can you please schedule her for cystoscopy with bilateral stent exchange in the [facility] in 4 months.  Needs urine culture prior but nothing else.    Thank you

## 2025-06-13 NOTE — OP NOTE
Operative Note     PATIENT:  Danielle Haque (MRN [id number])    DATE OF PROCEDURE:   6/13/2025    PRE-OP DIAGNOSIS:   1) bilateral ureteral stricture disease  2.  Complex nephrolithiasis  3.  History of pelvic radiation for rectal cancer  4.  Bilateral indwelling ureteral stents    POST-OP DIAGNOSIS:   1) bilateral ureteral stricture disease  2.  Complex nephrolithiasis  3.  History of pelvic radiation for rectal cancer  4.  Bilateral indwelling ureteral stents    PROCEDURES PERFORMED:  1) Cystoscopy  2) bilateral retrograde pyelography with fluoroscopic interpretation  3) Left ureteroscopy with treatment of ureteral stricture  4) bilateral ureteral stent exchange    SURGEON:  José Luis Stephens MD    NOTE:  There were no qualified teaching residents to assist with this case    ANESTHESIA: General/LMA     COMPLICATIONS:   None    ANTIBIOTICS:   Cipro (patient developed hives with initiation of Ancef which is a new finding for her.  This was immediately discontinued and switched to Cipro    INTRAOPERATIVE THROMBOEMBOLISM PROPHYLAXIS:  Pneumatic compression stockings     FINDINGS:  -The left mid ureter remained highly stenotic despite prior placement of a ureteral stent for 6 weeks  - Retrograde pyelogram confirmed a ureteral stricture which was somewhat longer and narrower than I anticipated, approximately 4 cm spanning the length of the mid ureter  - I was able to pass a 5 Grenadian open-ended ureteral catheter through the stricture, and with significant difficulty and resistance was able to pass the inner obturator of a 10/12 Grenadian ureteral access sheath.  However despite these maneuvers I could not pass the ureteroscope to evaluate further.  As the patient had some known calcifications within the ureter at the location of the stricture I felt that this was an absolute contraindication to a balloon dilation.  - My assessment is that due to the length, narrow caliber, and very firm and fixed nature of this  radiation-induced stricture she is a poor candidate for attempted endoscopic management.  This is an identical finding to the known proximal ureteral stricture disease on the contralateral right side  - Options presented to the patient going forward include chronic ureteral stent exchanges versus referral for a complex bilateral ureteral reconstructive procedure.      PROCEDURE IN DETAIL:   The patient was identified and brought to the OR.  Antibiotic prophylaxis and DVT prophylaxis were administered.  They were placed in the comfortable dorsal lithotomy position with care to pad all pressure points.  They were prepped and draped in the usual sterile fashion using hibiclens.  A surgical time out was performed with all in the room in agreement with the correct patient, procedure, indications, and laterality.  A 21-Polish rigid cystoscope was used to enter the bladder.  The bladder was inspected in its entirety and there were no lesions noted.  The ureteral orifices were identified in their normal orthotopic positions.     I began by performing a right ureteral stent exchange which is performed in the standard fashion and uncomplicated manner.  Chronic right pelvicaliectasis is noted.  Successful ureteral stent exchange.    The Left ureteral orifice was identified and a 5 Fr open ended catheter was placed into the ureteral orifice alongside the preplaced ureteral stent which was then removed.   A retrograde pyelogram was performed with the findings as described above.  A Sensor wire was advanced up to the kidney under fluoroscopic guidance.  Leaving this safety wire in place, the bladder was drained.       A   7.5 Polish semi-rigid ureteroscope was advanced up the ureter under vision .    The left mid ureter remained very stenotic despite prior stent placement.  I was unable to advance the endoscope to manage the stones prior to performing planned balloon dilation.  I performed a retrograde pyelogram through the  ureteroscope at this point.  This demonstrated that the stricture was longer than anticipated, approximately 4 to 5 cm overlying the mid ureter.  It was also quite stenotic.    I felt that balloon dilation was contraindicated in the setting of known stones within the ureter associated with the stricture and as such I elected to dilate the stricture, or attempt to dilate the stricture, utilizing a ureteral access sheath.  I began with a 10 Uruguayan obturator.  Initially this would not pass.  I exchanged the standard hybrid wire for a Super Stiff wire and was able to advance the 10 Uruguayan obturator to the level of the proximal ureter however stiff resistance was noted    I then reintroduced the endoscope.  I confirm that there was no perforation or injury to the ureter however I could again not advance the endoscope as proximally as necessary.  I made a gentle attempt to pass a the 12 Uruguayan obturator however this would not pass    Given the long length of the stricture, the degree of stenosis, as well as the very firm fixed nature of this it was clear to me that the stricture would not be amenable to ureteroscopic management.  Again I felt the balloon dilation was contraindicated in the setting of known stones associated with the stricture and as such I elected to terminate the procedure and place a ureteral stent.   A JJ stent was then passed up the wire  under fluoroscopic guidance into the kidney with a good curl noted in the kidney and in the bladder.   . The bladder was drained.      The patient was placed back supine, awakened from general anesthesia and brought to recovery room in stable condition.      ESTIMATED BLOOD LOSS:  Minimal      SPECIMENS:   No specimens collected during this procedure.     IMPLANTS:   Implant Name Type Inv. Item Serial No.  Lot No. LRB No. Used Action   STENT URETERAL 6FR 22CM INLAY OPTIMA W/NITINOL GDWR - [id number]  STENT URETERAL 6FR 22CM INLAY OPTIMA W/NITINOL GDWR   [facility] [id number] Right 1 Implanted   STENT URETERAL 6FR 22CM INLAY OPTIMA W/NITINOL GDWR - [id number]  STENT URETERAL 6FR 22CM INLAY OPTIMA W/NITINOL GDWR  [facility] [id number] Right 1 Implanted        COMPLICATIONS: None    DISPOSITION: PACU    PLAN:  Findings the time of surgery reviewed with the patient and her .  I presented to options:Chronic ureteral stent exchanges every 4 months versus referral for bilateral ureteral reconstruction which will be a very complex intra-abdominal procedure.

## 2025-06-16 NOTE — ANESTHESIA POSTPROCEDURE EVALUATION
Post-Op Assessment Note    CV Status:  Stable  Pain Score: 0    Pain management: adequate    Multimodal analgesia used between 6 hours prior to anesthesia start to PACU discharge    Mental Status:  Alert   Hydration Status:  Stable   PONV Controlled:  Controlled   Airway Patency:  Patent  Two or more mitigation strategies used for obstructive sleep apnea   Post Op Vitals Reviewed: Yes    No anethesia notable event occurred.    Staff: Anesthesiologist           Last Filed PACU Vitals:  Vitals Value Taken Time   Temp 98.2 °F (36.8 °C) 06/13/25 11:16   Pulse 82 06/13/25 11:21   /72 06/13/25 11:16   Resp 17 06/13/25 11:21   SpO2 98 % 06/13/25 11:21   Vitals shown include unfiled device data.    Modified Say:     Vitals Value Taken Time   Activity 2 06/13/25 11:16   Respiration 2 06/13/25 11:16   Circulation 2 06/13/25 11:16   Consciousness 1 06/13/25 11:16   Oxygen Saturation 2 06/13/25 11:16     Modified Say Score: 9

## 2025-06-18 ENCOUNTER — APPOINTMENT (OUTPATIENT)
Dept: RADIOLOGY | Facility: HOSPITAL | Age: 60
End: 2025-06-18
Payer: COMMERCIAL

## 2025-06-18 ENCOUNTER — HOSPITAL ENCOUNTER (OUTPATIENT)
Dept: ULTRASOUND IMAGING | Facility: HOSPITAL | Age: 60
Discharge: HOME | End: 2025-06-18
Attending: STUDENT IN AN ORGANIZED HEALTH CARE EDUCATION/TRAINING PROGRAM
Payer: COMMERCIAL

## 2025-06-18 DIAGNOSIS — D86.89 HEPATIC GRANULOMA ASSOCIATED WITH SARCOIDOSIS: ICD-10-CM

## 2025-06-18 PROCEDURE — 76700 US EXAM ABDOM COMPLETE: CPT

## 2025-06-18 PROCEDURE — 76981 USE PARENCHYMA: CPT

## 2025-07-03 ENCOUNTER — TELEPHONE (OUTPATIENT)
Age: 60
End: 2025-07-03

## 2025-07-03 ENCOUNTER — PATIENT MESSAGE (OUTPATIENT)
Dept: UROLOGY | Facility: CLINIC | Age: 60
End: 2025-07-03

## 2025-07-03 DIAGNOSIS — D50.9 IRON DEFICIENCY ANEMIA, UNSPECIFIED IRON DEFICIENCY ANEMIA TYPE: Primary | ICD-10-CM

## 2025-07-03 NOTE — TELEPHONE ENCOUNTER
Methotrexate 2.5mg     Pt had procedure at hospital and Urologist inadvertently cancelled Methotrexate in system.    Please reorder:  methotrexate 2.5 MG tablet; Take 6 tablets (15 mg total) by mouth once a week

## 2025-07-08 ENCOUNTER — HOSPITAL ENCOUNTER (OUTPATIENT)
Dept: INFUSION CENTER | Facility: CLINIC | Age: 60
Discharge: HOME | End: 2025-07-08
Payer: COMMERCIAL

## 2025-07-08 DIAGNOSIS — Z79.899 HIGH RISK MEDICATION USE: ICD-10-CM

## 2025-07-08 DIAGNOSIS — C20 RECTAL CANCER (HCC): ICD-10-CM

## 2025-07-08 DIAGNOSIS — D86.9 SARCOIDOSIS: ICD-10-CM

## 2025-07-08 DIAGNOSIS — D50.9 IRON DEFICIENCY ANEMIA, UNSPECIFIED IRON DEFICIENCY ANEMIA TYPE: ICD-10-CM

## 2025-07-08 DIAGNOSIS — Z95.828 PORT-A-CATH IN PLACE: Primary | ICD-10-CM

## 2025-07-08 LAB
BASOPHILS # BLD AUTO: 0.01 THOUSANDS/ÂΜL (ref 0–0.1)
BASOPHILS NFR BLD AUTO: 0 % (ref 0–1)
CEA SERPL-MCNC: 9.7 NG/ML (ref 0–3)
CREAT SERPL-MCNC: 1.03 MG/DL (ref 0.6–1.3)
EOSINOPHIL # BLD AUTO: 0.23 THOUSAND/ÂΜL (ref 0–0.61)
EOSINOPHIL NFR BLD AUTO: 9 % (ref 0–6)
ERYTHROCYTE [DISTWIDTH] IN BLOOD BY AUTOMATED COUNT: 12.1 % (ref 11.6–15.1)
FERRITIN SERPL-MCNC: 407 NG/ML (ref 30–307)
GFR SERPL CREATININE-BSD FRML MDRD: 60 ML/MIN/1.73SQ M
HCT VFR BLD AUTO: 27.1 % (ref 34.8–46.1)
HGB BLD-MCNC: 9.1 G/DL (ref 11.5–15.4)
HGB RETIC QN AUTO: 34.5 PG (ref 30–38.3)
IMM GRANULOCYTES # BLD AUTO: 0 THOUSAND/UL (ref 0–0.2)
IMM GRANULOCYTES NFR BLD AUTO: 0 % (ref 0–2)
IMM RETICS NFR: 11.9 % (ref 0–14)
IRON SATN MFR SERPL: 25 % (ref 15–50)
IRON SERPL-MCNC: 91 UG/DL (ref 50–212)
LYMPHOCYTES # BLD AUTO: 0.31 THOUSANDS/ÂΜL (ref 0.6–4.47)
LYMPHOCYTES NFR BLD AUTO: 12 % (ref 14–44)
MCH RBC QN AUTO: 32 PG (ref 26.8–34.3)
MCHC RBC AUTO-ENTMCNC: 33.6 G/DL (ref 31.4–37.4)
MCV RBC AUTO: 95 FL (ref 82–98)
MONOCYTES # BLD AUTO: 0.16 THOUSAND/ÂΜL (ref 0.17–1.22)
MONOCYTES NFR BLD AUTO: 6 % (ref 4–12)
NEUTROPHILS # BLD AUTO: 1.99 THOUSANDS/ÂΜL (ref 1.85–7.62)
NEUTS SEG NFR BLD AUTO: 73 % (ref 43–75)
NRBC BLD AUTO-RTO: 0 /100 WBCS
PLATELET # BLD AUTO: 213 THOUSANDS/UL (ref 149–390)
PMV BLD AUTO: 9.6 FL (ref 8.9–12.7)
RBC # BLD AUTO: 2.84 MILLION/UL (ref 3.81–5.12)
RETICS # AUTO: NORMAL 10*3/UL (ref 14097–95744)
RETICS # CALC: 1.22 % (ref 0.37–1.87)
TIBC SERPL-MCNC: 369.6 UG/DL (ref 250–450)
TRANSFERRIN SERPL-MCNC: 264 MG/DL (ref 203–362)
UIBC SERPL-MCNC: 279 UG/DL (ref 155–355)
WBC # BLD AUTO: 2.7 THOUSAND/UL (ref 4.31–10.16)

## 2025-07-08 PROCEDURE — 82378 CARCINOEMBRYONIC ANTIGEN: CPT

## 2025-07-08 PROCEDURE — 83540 ASSAY OF IRON: CPT

## 2025-07-08 PROCEDURE — 82728 ASSAY OF FERRITIN: CPT

## 2025-07-08 PROCEDURE — 85025 COMPLETE CBC W/AUTO DIFF WBC: CPT

## 2025-07-08 PROCEDURE — 85046 RETICYTE/HGB CONCENTRATE: CPT

## 2025-07-08 PROCEDURE — 82565 ASSAY OF CREATININE: CPT

## 2025-07-08 PROCEDURE — 83550 IRON BINDING TEST: CPT

## 2025-07-08 NOTE — PROGRESS NOTES
Patient arrives to infusion center for lab draw today. R PAC accessed without issue, multiple flushes to assist in achieving brisk blood return - successfully collected labs, flushed port well without resistance. Deaccessed, bandaid in place. Patient aware of next appt at [facility] on 8/26 at 0900, declines AVS.

## 2025-07-11 ENCOUNTER — TELEPHONE (OUTPATIENT)
Dept: HEMATOLOGY ONCOLOGY | Facility: CLINIC | Age: 60
End: 2025-07-11

## 2025-07-11 DIAGNOSIS — C20 RECTAL CANCER (HCC): Primary | ICD-10-CM

## 2025-07-11 DIAGNOSIS — C18.7 MALIGNANT NEOPLASM OF SIGMOID COLON (HCC): ICD-10-CM

## 2025-07-13 ENCOUNTER — HOSPITAL ENCOUNTER (OUTPATIENT)
Dept: RADIOLOGY | Facility: HOSPITAL | Age: 60
Discharge: HOME | End: 2025-07-13
Attending: INTERNAL MEDICINE
Payer: COMMERCIAL

## 2025-07-13 DIAGNOSIS — G50.0 TRIGEMINAL NEURALGIA OF LEFT SIDE OF FACE: ICD-10-CM

## 2025-07-13 DIAGNOSIS — R51.9 LEFT FACIAL PAIN: ICD-10-CM

## 2025-07-13 PROCEDURE — 70553 MRI BRAIN STEM W/O & W/DYE: CPT

## 2025-07-13 PROCEDURE — A9585 GADOBUTROL INJECTION: HCPCS | Performed by: INTERNAL MEDICINE

## 2025-07-13 RX ORDER — GADOBUTROL 604.72 MG/ML
5 INJECTION INTRAVENOUS
Status: COMPLETED | OUTPATIENT
Start: 2025-07-13 | End: 2025-07-13

## 2025-07-13 RX ADMIN — GADOBUTROL 5 ML: 604.72 INJECTION INTRAVENOUS at 20:49

## 2025-07-18 ENCOUNTER — APPOINTMENT (EMERGENCY)
Dept: CT IMAGING | Facility: HOSPITAL | Age: 60
End: 2025-07-18
Payer: COMMERCIAL

## 2025-07-18 ENCOUNTER — HOSPITAL ENCOUNTER (OUTPATIENT)
Facility: HOSPITAL | Age: 60
Setting detail: OBSERVATION
Discharge: HOME | End: 2025-07-20
Attending: EMERGENCY MEDICINE | Admitting: UROLOGY
Payer: COMMERCIAL

## 2025-07-18 DIAGNOSIS — N13.1 HYDRONEPHROSIS DUE TO OBSTRUCTION OF URETER: Primary | ICD-10-CM

## 2025-07-18 DIAGNOSIS — R91.8 PULMONARY NODULES: ICD-10-CM

## 2025-07-18 DIAGNOSIS — N32.89 BLADDER SPASM: ICD-10-CM

## 2025-07-18 LAB
ALBUMIN SERPL BCG-MCNC: 4.3 G/DL (ref 3.5–5)
ALP SERPL-CCNC: 95 U/L (ref 34–104)
ALT SERPL W P-5'-P-CCNC: 24 U/L (ref 7–52)
ANION GAP SERPL CALCULATED.3IONS-SCNC: 9 MMOL/L (ref 4–13)
ANISOCYTOSIS BLD QL SMEAR: PRESENT
AST SERPL W P-5'-P-CCNC: 25 U/L (ref 13–39)
BASOPHILS # BLD MANUAL: 0 THOUSAND/UL (ref 0–0.1)
BASOPHILS NFR MAR MANUAL: 0 % (ref 0–1)
BILIRUB SERPL-MCNC: 0.49 MG/DL (ref 0.2–1)
BUN SERPL-MCNC: 31 MG/DL (ref 5–25)
CALCIUM SERPL-MCNC: 11 MG/DL (ref 8.4–10.2)
CHLORIDE SERPL-SCNC: 104 MMOL/L (ref 96–108)
CO2 SERPL-SCNC: 26 MMOL/L (ref 21–32)
CREAT SERPL-MCNC: 1.42 MG/DL (ref 0.6–1.3)
EOSINOPHIL # BLD MANUAL: 0.17 THOUSAND/UL (ref 0–0.4)
EOSINOPHIL NFR BLD MANUAL: 2 % (ref 0–6)
ERYTHROCYTE [DISTWIDTH] IN BLOOD BY AUTOMATED COUNT: 12.6 % (ref 11.6–15.1)
GFR SERPL CREATININE-BSD FRML MDRD: 41 ML/MIN/1.73SQ M
GLUCOSE SERPL-MCNC: 128 MG/DL (ref 65–140)
HCT VFR BLD AUTO: 29.2 % (ref 34.8–46.1)
HGB BLD-MCNC: 9.9 G/DL (ref 11.5–15.4)
LYMPHOCYTES # BLD AUTO: 0.25 THOUSAND/UL (ref 0.6–4.47)
LYMPHOCYTES # BLD AUTO: 3 % (ref 14–44)
MCH RBC QN AUTO: 31.8 PG (ref 26.8–34.3)
MCHC RBC AUTO-ENTMCNC: 33.9 G/DL (ref 31.4–37.4)
MCV RBC AUTO: 94 FL (ref 82–98)
MONOCYTES # BLD AUTO: 0.42 THOUSAND/UL (ref 0–1.22)
MONOCYTES NFR BLD: 5 % (ref 4–12)
NEUTROPHILS # BLD MANUAL: 7.61 THOUSAND/UL (ref 1.85–7.62)
NEUTS SEG NFR BLD AUTO: 90 % (ref 43–75)
PLATELET # BLD AUTO: 225 THOUSANDS/UL (ref 149–390)
PLATELET BLD QL SMEAR: ADEQUATE
PMV BLD AUTO: 9.6 FL (ref 8.9–12.7)
POTASSIUM SERPL-SCNC: 4.1 MMOL/L (ref 3.5–5.3)
PROT SERPL-MCNC: 7.2 G/DL (ref 6.4–8.4)
RBC # BLD AUTO: 3.11 MILLION/UL (ref 3.81–5.12)
RBC MORPH BLD: PRESENT
SODIUM SERPL-SCNC: 139 MMOL/L (ref 135–147)
WBC # BLD AUTO: 8.46 THOUSAND/UL (ref 4.31–10.16)

## 2025-07-18 PROCEDURE — 36415 COLL VENOUS BLD VENIPUNCTURE: CPT

## 2025-07-18 PROCEDURE — 99285 EMERGENCY DEPT VISIT HI MDM: CPT | Performed by: EMERGENCY MEDICINE

## 2025-07-18 PROCEDURE — 81001 URINALYSIS AUTO W/SCOPE: CPT

## 2025-07-18 PROCEDURE — 85027 COMPLETE CBC AUTOMATED: CPT

## 2025-07-18 PROCEDURE — 96375 TX/PRO/DX INJ NEW DRUG ADDON: CPT

## 2025-07-18 PROCEDURE — 71260 CT THORAX DX C+: CPT

## 2025-07-18 PROCEDURE — 85007 BL SMEAR W/DIFF WBC COUNT: CPT

## 2025-07-18 PROCEDURE — 99284 EMERGENCY DEPT VISIT MOD MDM: CPT

## 2025-07-18 PROCEDURE — 80053 COMPREHEN METABOLIC PANEL: CPT

## 2025-07-18 PROCEDURE — 87086 URINE CULTURE/COLONY COUNT: CPT

## 2025-07-18 PROCEDURE — 74177 CT ABD & PELVIS W/CONTRAST: CPT

## 2025-07-18 RX ORDER — MORPHINE SULFATE 4 MG/ML
4 INJECTION, SOLUTION INTRAMUSCULAR; INTRAVENOUS ONCE
Status: COMPLETED | OUTPATIENT
Start: 2025-07-18 | End: 2025-07-18

## 2025-07-18 RX ORDER — KETOROLAC TROMETHAMINE 30 MG/ML
15 INJECTION, SOLUTION INTRAMUSCULAR; INTRAVENOUS ONCE
Status: COMPLETED | OUTPATIENT
Start: 2025-07-18 | End: 2025-07-18

## 2025-07-18 RX ADMIN — MORPHINE SULFATE 4 MG: 4 INJECTION INTRAVENOUS at 22:59

## 2025-07-18 RX ADMIN — KETOROLAC TROMETHAMINE 15 MG: 30 INJECTION, SOLUTION INTRAMUSCULAR; INTRAVENOUS at 22:57

## 2025-07-19 ENCOUNTER — TELEPHONE (OUTPATIENT)
Dept: UROLOGY | Facility: CLINIC | Age: 60
End: 2025-07-19

## 2025-07-19 ENCOUNTER — APPOINTMENT (OUTPATIENT)
Dept: RADIOLOGY | Facility: HOSPITAL | Age: 60
End: 2025-07-19
Payer: COMMERCIAL

## 2025-07-19 ENCOUNTER — ANESTHESIA EVENT (OUTPATIENT)
Dept: PERIOP | Facility: HOSPITAL | Age: 60
End: 2025-07-19
Payer: COMMERCIAL

## 2025-07-19 ENCOUNTER — ANESTHESIA (OUTPATIENT)
Dept: PERIOP | Facility: HOSPITAL | Age: 60
End: 2025-07-19
Payer: COMMERCIAL

## 2025-07-19 LAB

## 2025-07-19 PROCEDURE — C2625 STENT, NON-COR, TEM W/DEL SY: HCPCS | Performed by: UROLOGY

## 2025-07-19 PROCEDURE — 52332 CYSTOSCOPY AND TREATMENT: CPT | Performed by: UROLOGY

## 2025-07-19 PROCEDURE — 87086 URINE CULTURE/COLONY COUNT: CPT | Performed by: UROLOGY

## 2025-07-19 PROCEDURE — 96365 THER/PROPH/DIAG IV INF INIT: CPT

## 2025-07-19 PROCEDURE — C1758 CATHETER, URETERAL: HCPCS | Performed by: UROLOGY

## 2025-07-19 PROCEDURE — C1769 GUIDE WIRE: HCPCS | Performed by: UROLOGY

## 2025-07-19 PROCEDURE — 74420 UROGRAPHY RTRGR +-KUB: CPT

## 2025-07-19 PROCEDURE — 87106 FUNGI IDENTIFICATION YEAST: CPT | Performed by: UROLOGY

## 2025-07-19 PROCEDURE — 96366 THER/PROPH/DIAG IV INF ADDON: CPT

## 2025-07-19 PROCEDURE — NC001 PR NO CHARGE: Performed by: UROLOGY

## 2025-07-19 DEVICE — IMPLANTABLE DEVICE: Type: IMPLANTABLE DEVICE | Site: URETER | Status: FUNCTIONAL

## 2025-07-19 RX ORDER — FENTANYL CITRATE/PF 50 MCG/ML
50 SYRINGE (ML) INJECTION
Status: DISCONTINUED | OUTPATIENT
Start: 2025-07-19 | End: 2025-07-19 | Stop reason: HOSPADM

## 2025-07-19 RX ORDER — PHENYLEPHRINE HCL IN 0.9% NACL 1 MG/10 ML
SYRINGE (ML) INTRAVENOUS AS NEEDED
Status: DISCONTINUED | OUTPATIENT
Start: 2025-07-19 | End: 2025-07-19

## 2025-07-19 RX ORDER — HYDROMORPHONE HCL/PF 1 MG/ML
0.5 SYRINGE (ML) INJECTION
Status: DISCONTINUED | OUTPATIENT
Start: 2025-07-19 | End: 2025-07-20 | Stop reason: HOSPADM

## 2025-07-19 RX ORDER — HYDROMORPHONE HCL/PF 1 MG/ML
0.5 SYRINGE (ML) INJECTION
Status: DISCONTINUED | OUTPATIENT
Start: 2025-07-19 | End: 2025-07-19 | Stop reason: HOSPADM

## 2025-07-19 RX ORDER — ONDANSETRON 2 MG/ML
INJECTION INTRAMUSCULAR; INTRAVENOUS AS NEEDED
Status: DISCONTINUED | OUTPATIENT
Start: 2025-07-19 | End: 2025-07-19

## 2025-07-19 RX ORDER — DEXTROSE MONOHYDRATE AND SODIUM CHLORIDE 5; .9 G/100ML; G/100ML
100 INJECTION, SOLUTION INTRAVENOUS CONTINUOUS
Status: DISCONTINUED | OUTPATIENT
Start: 2025-07-19 | End: 2025-07-20 | Stop reason: HOSPADM

## 2025-07-19 RX ORDER — ACETAMINOPHEN 325 MG/1
650 TABLET ORAL EVERY 4 HOURS PRN
Status: DISCONTINUED | OUTPATIENT
Start: 2025-07-19 | End: 2025-07-20 | Stop reason: HOSPADM

## 2025-07-19 RX ORDER — MIDAZOLAM HYDROCHLORIDE 2 MG/2ML
INJECTION, SOLUTION INTRAMUSCULAR; INTRAVENOUS AS NEEDED
Status: DISCONTINUED | OUTPATIENT
Start: 2025-07-19 | End: 2025-07-19

## 2025-07-19 RX ORDER — GABAPENTIN 300 MG/1
300 CAPSULE ORAL
Status: DISCONTINUED | OUTPATIENT
Start: 2025-07-19 | End: 2025-07-20 | Stop reason: HOSPADM

## 2025-07-19 RX ORDER — PROMETHAZINE HYDROCHLORIDE 25 MG/ML
25 INJECTION, SOLUTION INTRAMUSCULAR; INTRAVENOUS ONCE AS NEEDED
Status: DISCONTINUED | OUTPATIENT
Start: 2025-07-19 | End: 2025-07-19 | Stop reason: HOSPADM

## 2025-07-19 RX ORDER — DIPHENHYDRAMINE HYDROCHLORIDE 50 MG/ML
12.5 INJECTION, SOLUTION INTRAMUSCULAR; INTRAVENOUS ONCE AS NEEDED
Status: DISCONTINUED | OUTPATIENT
Start: 2025-07-19 | End: 2025-07-19 | Stop reason: HOSPADM

## 2025-07-19 RX ORDER — HYDROCODONE BITARTRATE AND ACETAMINOPHEN 5; 325 MG/1; MG/1
1 TABLET ORAL EVERY 6 HOURS PRN
Refills: 0 | Status: DISCONTINUED | OUTPATIENT
Start: 2025-07-19 | End: 2025-07-20 | Stop reason: HOSPADM

## 2025-07-19 RX ORDER — FENOFIBRATE 145 MG/1
145 TABLET, FILM COATED ORAL DAILY
Status: DISCONTINUED | OUTPATIENT
Start: 2025-07-19 | End: 2025-07-20 | Stop reason: HOSPADM

## 2025-07-19 RX ORDER — LIDOCAINE HYDROCHLORIDE 10 MG/ML
INJECTION, SOLUTION EPIDURAL; INFILTRATION; INTRACAUDAL; PERINEURAL AS NEEDED
Status: DISCONTINUED | OUTPATIENT
Start: 2025-07-19 | End: 2025-07-19

## 2025-07-19 RX ORDER — ONDANSETRON 2 MG/ML
4 INJECTION INTRAMUSCULAR; INTRAVENOUS EVERY 6 HOURS PRN
Status: DISCONTINUED | OUTPATIENT
Start: 2025-07-19 | End: 2025-07-20 | Stop reason: HOSPADM

## 2025-07-19 RX ORDER — TAMSULOSIN HYDROCHLORIDE 0.4 MG/1
0.4 CAPSULE ORAL
Status: DISCONTINUED | OUTPATIENT
Start: 2025-07-19 | End: 2025-07-20 | Stop reason: HOSPADM

## 2025-07-19 RX ORDER — CIPROFLOXACIN 2 MG/ML
400 INJECTION, SOLUTION INTRAVENOUS ONCE
Status: DISCONTINUED | OUTPATIENT
Start: 2025-07-19 | End: 2025-07-19 | Stop reason: SDUPTHER

## 2025-07-19 RX ORDER — CIPROFLOXACIN 2 MG/ML
400 INJECTION, SOLUTION INTRAVENOUS
Status: DISCONTINUED | OUTPATIENT
Start: 2025-07-19 | End: 2025-07-19

## 2025-07-19 RX ORDER — PROPOFOL 10 MG/ML
INJECTION, EMULSION INTRAVENOUS AS NEEDED
Status: DISCONTINUED | OUTPATIENT
Start: 2025-07-19 | End: 2025-07-19

## 2025-07-19 RX ORDER — DEXAMETHASONE SODIUM PHOSPHATE 10 MG/ML
INJECTION, SOLUTION INTRAMUSCULAR; INTRAVENOUS AS NEEDED
Status: DISCONTINUED | OUTPATIENT
Start: 2025-07-19 | End: 2025-07-19

## 2025-07-19 RX ORDER — HYDROCODONE BITARTRATE AND ACETAMINOPHEN 5; 325 MG/1; MG/1
2 TABLET ORAL EVERY 6 HOURS PRN
Refills: 0 | Status: DISCONTINUED | OUTPATIENT
Start: 2025-07-19 | End: 2025-07-20 | Stop reason: HOSPADM

## 2025-07-19 RX ORDER — SODIUM CHLORIDE, SODIUM LACTATE, POTASSIUM CHLORIDE, CALCIUM CHLORIDE 600; 310; 30; 20 MG/100ML; MG/100ML; MG/100ML; MG/100ML
INJECTION, SOLUTION INTRAVENOUS CONTINUOUS PRN
Status: DISCONTINUED | OUTPATIENT
Start: 2025-07-19 | End: 2025-07-19

## 2025-07-19 RX ORDER — URSODIOL 300 MG/1
900 CAPSULE ORAL DAILY
Status: DISCONTINUED | OUTPATIENT
Start: 2025-07-19 | End: 2025-07-20 | Stop reason: HOSPADM

## 2025-07-19 RX ORDER — FENTANYL CITRATE 50 UG/ML
INJECTION, SOLUTION INTRAMUSCULAR; INTRAVENOUS AS NEEDED
Status: DISCONTINUED | OUTPATIENT
Start: 2025-07-19 | End: 2025-07-19

## 2025-07-19 RX ADMIN — SODIUM CHLORIDE, SODIUM LACTATE, POTASSIUM CHLORIDE, AND CALCIUM CHLORIDE: .6; .31; .03; .02 INJECTION, SOLUTION INTRAVENOUS at 09:02

## 2025-07-19 RX ADMIN — PROPOFOL 150 MG: 10 INJECTION, EMULSION INTRAVENOUS at 09:17

## 2025-07-19 RX ADMIN — DEXTROSE AND SODIUM CHLORIDE 100 ML/HR: 5; .9 INJECTION, SOLUTION INTRAVENOUS at 02:06

## 2025-07-19 RX ADMIN — HYDROCODONE BITARTRATE AND ACETAMINOPHEN 2 TABLET: 5; 325 TABLET ORAL at 11:02

## 2025-07-19 RX ADMIN — HYDROCODONE BITARTRATE AND ACETAMINOPHEN 2 TABLET: 5; 325 TABLET ORAL at 21:16

## 2025-07-19 RX ADMIN — MIDAZOLAM 2 MG: 1 INJECTION INTRAMUSCULAR; INTRAVENOUS at 09:09

## 2025-07-19 RX ADMIN — DEXTROSE AND SODIUM CHLORIDE 100 ML/HR: 5; .9 INJECTION, SOLUTION INTRAVENOUS at 17:06

## 2025-07-19 RX ADMIN — ONDANSETRON 4 MG: 2 INJECTION, SOLUTION INTRAMUSCULAR; INTRAVENOUS at 09:39

## 2025-07-19 RX ADMIN — Medication 200 MCG: at 09:37

## 2025-07-19 RX ADMIN — LIDOCAINE HYDROCHLORIDE 50 MG: 10 INJECTION, SOLUTION EPIDURAL; INFILTRATION; INTRACAUDAL at 09:17

## 2025-07-19 RX ADMIN — FENTANYL CITRATE 50 MCG: 50 INJECTION INTRAMUSCULAR; INTRAVENOUS at 09:59

## 2025-07-19 RX ADMIN — Medication 200 MCG: at 09:21

## 2025-07-19 RX ADMIN — HYDROMORPHONE HYDROCHLORIDE 0.5 MG: 1 INJECTION, SOLUTION INTRAMUSCULAR; INTRAVENOUS; SUBCUTANEOUS at 10:13

## 2025-07-19 RX ADMIN — DEXAMETHASONE SODIUM PHOSPHATE 10 MG: 10 INJECTION, SOLUTION INTRAMUSCULAR; INTRAVENOUS at 09:24

## 2025-07-19 RX ADMIN — IOHEXOL 80 ML: 350 INJECTION, SOLUTION INTRAVENOUS at 00:07

## 2025-07-19 RX ADMIN — GABAPENTIN 300 MG: 300 CAPSULE ORAL at 02:07

## 2025-07-19 RX ADMIN — FENTANYL CITRATE 50 MCG: 50 INJECTION INTRAMUSCULAR; INTRAVENOUS at 09:17

## 2025-07-19 RX ADMIN — FENTANYL CITRATE 50 MCG: 50 INJECTION INTRAMUSCULAR; INTRAVENOUS at 10:06

## 2025-07-19 RX ADMIN — GABAPENTIN 300 MG: 300 CAPSULE ORAL at 21:16

## 2025-07-19 RX ADMIN — TAMSULOSIN HYDROCHLORIDE 0.4 MG: 0.4 CAPSULE ORAL at 17:06

## 2025-07-19 RX ADMIN — CIPROFLOXACIN: 2 INJECTION, SOLUTION INTRAVENOUS at 09:09

## 2025-07-19 RX ADMIN — HYDROMORPHONE HYDROCHLORIDE 0.5 MG: 1 INJECTION, SOLUTION INTRAMUSCULAR; INTRAVENOUS; SUBCUTANEOUS at 14:38

## 2025-07-19 NOTE — H&P
UROLOGY HISTORY AND PHYSICAL     Patient Identifiers: Danielle Haque (MRN [id number])      Date of Service: 7/19/2025        ASSESSMENT:     59 y.o. old female with complex bilateral ureteral stricture disease secondary to recurrent nephrolithiasis and pelvic radiation well-known to me.    Suspected progressive stricture disease with worsening right hydronephrosis flank pain and THOR 1 month after her left stent exchange.    PLAN:     Have offered a return to the operating room for bilateral ureteral stent exchange in the hopes that this will improve her drainage of her kidney.  If it does not we discussed the possibility of requiring nephrostomy tube placement.    Risk benefits and alternatives presented.  Informed consent obtained for cystoscopy, bilateral retrograde pyelography and ureteral stent exchange.  May consider placing tandem ureteral stents on the right side pending intraoperative findings    Patient will be admitted to our service for observation and monitoring of her renal function and symptomatology      History of Present Illness:     Danielle Haque is a 59 y.o. old with a history of complex bilateral ureteral stricture disease presents with right-sided flank pain and imaging and laboratory data consistent with worsening obstructive uropathy on the right side    Past Medical, Past Surgical History:   Past Medical History[1]:    Past Surgical History[2]:    Medications, Allergies:   Current Medications[3]    Allergies:  Allergies[4]:    Social and Family History:   Social History:   Social History[5].    Tobacco Use History[6]    Family History:  Family History[7]:     Review of Systems:     General: Fever, chills, or night sweats: negative  Cardiac: Negative for chest pain.    Pulmonary: Negative for shortness of breath.  Gastrointestinal: Abdominal pain negative  Nausea, vomiting, or diarrhea negative  Genitourinary: See HPI above.  Patient does nothave hematuria.  All other systems queried were  negative.    Physical Exam:   General: Patient is pleasant and in NAD. Awake and alert  BP (!) 97/48   Pulse 69   Temp 99.3 °F (37.4 °C)   Resp 16   Wt 52.2 kg (115 lb)   SpO2 98%   BMI 21.03 kg/m²   HEENT:  Normocephalic atraumatic  Cardiac:  Regular rate and rhythm, Peripheral edema: negative  Pulmonary: Non-labored breathing, CTAB  Abdomen: Soft, non-tender, non-distended.  No surgical scars.  No masses, tenderness, hernias noted.    Genitourinary: negative CVA tenderness, neg suprapubic tenderness.  Extremities: normal movement in all 4       Labs:     Lab Results   Component Value Date    HGB 9.9 (L) 07/18/2025    HCT 29.2 (L) 07/18/2025    WBC 8.46 07/18/2025     07/18/2025   ]    Lab Results   Component Value Date    K 4.1 07/18/2025     07/18/2025    CO2 26 07/18/2025    BUN 31 (H) 07/18/2025    CREATININE 1.42 (H) 07/18/2025    CALCIUM 11.0 (H) 07/18/2025   ]    Imaging:   I personally reviewed the images and report of the following studies, and reviewed them with the patient:        Thank you for allowing me to participate in this patients’ care.  Please do not hesitate to call with any additional questions.  José Luis Stephens MD           [1]   Past Medical History:  Diagnosis Date    Anemia     Bleeding from colostomy stoma (HCC) 02/11/2023     states ileostomy , not colostomy    Cancer (HCC)     Chronic kidney disease     Colon cancer (HCC)     Cyst (solitary) of breast, left     Dermoid cyst of neck     RIGHT    Dermoid cyst of skin of back     LEFT    Elevated serum creatinine 09/11/2024    Fall     Headache     Hemochromatosis, unspecified     History of transfusion     within 2 months 4/2/2025    Hyperlipidemia     Hypokalemia 09/12/2024    Kidney calculi     Kidney stone     Liver disease     hemangioma    Muscle weakness     Personal history of COVID-19 12/2022    Sarcoidosis     Seizures (HCC)     2022-- one seizure at that time - no further seizures at this time     Shingles     Ureteral stent present    [2]   Past Surgical History:  Procedure Laterality Date    ABSCESS DRAINAGE      left breast    BREAST BIOPSY      CHEST TUBE INSERTION      COLON SURGERY      colostomy    COLONOSCOPY      FACIAL/NECK BIOPSY Right 05/01/2025    Procedure: EXCISION OF DERMAL CYST RIGHT NECK WITH COMPLEX CLOSURE;  Surgeon: Sincere Johnston MD;  Location: [facility];  Service: Plastics    FL GUIDED CENTRAL VENOUS ACCESS DEVICE INSERTION  03/21/2023    FL RETROGRADE PYELOGRAM  01/23/2023    FL RETROGRADE PYELOGRAM  04/03/2023    FL RETROGRADE PYELOGRAM  07/21/2023    FL RETROGRADE PYELOGRAM  10/11/2023    FL RETROGRADE PYELOGRAM  12/15/2023    FL RETROGRADE PYELOGRAM  02/08/2024    FL RETROGRADE PYELOGRAM  05/10/2024    FL RETROGRADE PYELOGRAM  09/26/2024    FL RETROGRADE PYELOGRAM  10/18/2024    FL RETROGRADE PYELOGRAM  01/17/2025    FL RETROGRADE PYELOGRAM  04/11/2025    FL RETROGRADE PYELOGRAM  6/13/2025    ILEOSTOMY      IR BIOPSY LIVER MASS  05/09/2023    LUNG BIOPSY      VA CYSTO W/INSERT URETERAL STENT Bilateral 05/10/2024    Procedure: EXCHANGE STENT URETERAL;  Surgeon: José Luis Stephens MD;  [facility];  Service: Urology    VA CYSTO W/INSERT URETERAL STENT Bilateral 10/18/2024    Procedure: EXCHANGE STENT URETERAL;  Surgeon: José Luis Stephens MD;  [facility];  Service: Urology    VA CYSTO W/INSERT URETERAL STENT Bilateral 04/11/2025    Procedure: EXCHANGE STENT URETERAL;  Surgeon: José Luis Stephens MD;  [facility];  Service: Urology    VA CYSTO W/INSERT URETERAL STENT Right 6/13/2025    Procedure: EXCHANGE STENT URETERAL;  Surgeon: José Luis Stephens MD;  [facility];  Service: Urology    VA CYSTO/URETERO W/LITHOTRIPSY &INDWELL STENT INSRT Bilateral 01/23/2023    Procedure: CYSTOSCOPY  RIGHT URETEROSCOPY WITH LITHOTRIPSY HOLMIUM LASER, BILATERAL RETROGRADE PYELOGRAM AND BILATERAL  URETERAL STENT INSERTION;  Surgeon: José Luis  MD Angelo;  [facility];  Service: Urology    SD CYSTO/URETERO W/LITHOTRIPSY &INDWELL STENT INSRT Right 04/03/2023    Procedure: RIGHT URETEROSCOPY WITH LITHOTRIPSY HOLMIUM LASER, RETROGRADE PYELOGRAM; BILATERAL EXCHANGE STENT URETERAL;  Surgeon: José Luis Stephens MD;  [facility];  Service: Urology    SD CYSTO/URETERO W/LITHOTRIPSY &INDWELL STENT INSRT Bilateral 10/11/2023    Procedure: CYSTOSCOPY URETEROSCOPY RETROGRADE PYELOGRAM AND INSERTION STENT URETERAL DIAGNOSTINC RIGHT URETEROSCOPY, REMOVAL STENT LEFT SIDE;  Surgeon: José Luis Stephens MD;  [facility];  Service: Urology    SD CYSTO/URETERO W/LITHOTRIPSY &INDWELL STENT INSRT Bilateral 09/26/2024    Procedure: CYSTOSCOPY ,LEFT  URETEROSCOPY,  BILATERAL  RETROGRADE PYELOGRAM AND BILATERAL URETERAL STENT EXCHANGE;  Surgeon: Favian Barber MD;  [facility];  Service: Urology    SD CYSTO/URETERO W/LITHOTRIPSY &INDWELL STENT INSRT Left 10/18/2024    Procedure: CYSTOSCOPY LEFT URETEROSCOPY, treatment of LEFT ureteral stricture, RETROGRADE PYELOGRAM AND BILATERAL STENT EXCHANGE;  Surgeon: José Luis Stephens MD;  [facility];  Service: Urology    SD CYSTO/URETERO W/LITHOTRIPSY &INDWELL STENT INSRT Left 04/11/2025    Procedure: cysto/ L. RGP/ L. URS with lithotripsy and L. stent insertion;  Surgeon: José Luis Stephens MD;  [facility];  Service: Urology    SD CYSTO/URETERO W/LITHOTRIPSY &INDWELL STENT INSRT Left 6/13/2025    Procedure: CYSTOSCOPY URETEROSCOPY, RETROGRADE PYELOGRAM AND INSERTION STENT URETERAL, right ureteral stent exchange;  Surgeon: José Luis Stephens MD;  [facility];  Service: Urology    SD CYSTOURETHROSCOPY W/URETERAL CATHETERIZATION Bilateral 07/21/2023    Procedure: CYSTOSCOPY URETEROSCOPY RETROGRADE PYELOGRAM WITH BILATERAL STENT URETERAL EXCHANGE; LEFT LASER LITHOTRIPSY AND STONE BASKET RETRIEVAL;  Surgeon: José Luis Stephens MD;  [facility];  Service: Urology    MI CYSTOURETHROSCOPY W/URETERAL CATHETERIZATION Bilateral 12/15/2023    Procedure: CYSTOSCOPY, BILATERAL  RETROGRADE PYELOGRAM , STENT EXCHANGE RIGHT , LEFT URETEROSCOPY ,  HOLMIUM LASER WITH INSERTION LEFT URETERAL STENT;  Surgeon: Derick Bhakta MD;  [facility];  Service: Urology    MI CYSTOURETHROSCOPY W/URETERAL CATHETERIZATION Left 02/08/2024    Procedure: CYSTOSCOPY B/L RETROGRADE PYELOGRAM WITH B/L T URETERALSTENT EXCHANGE,LEFT URETEROSCOPY, DILATION LEFT URETERAL STICTURE;  Surgeon: José Luis Stephens MD;  [facility];  Service: Urology    MI CYSTOURETHROSCOPY W/URETERAL CATHETERIZATION Bilateral 05/10/2024    Procedure: CYSTOSCOPY RETROGRADE PYELOGRAM WITH INSERTION STENT URETERAL;  Surgeon: José Luis Stephens MD;  [facility];  Service: Urology    MI CYSTOURETHROSCOPY W/URETERAL CATHETERIZATION Right 01/17/2025    Procedure: CYSTOSCOPY RETROGRADE PYELOGRAM WITH INSERTION STENT URETERAL, LEFT RETROGRADE PYELOGRAM AND STENT REMOVAL;  Surgeon: José Luis Stephens MD;  [facility];  Service: Urology    MI CYSTOURETHROSCOPY W/URETERAL CATHETERIZATION Bilateral 04/11/2025    Procedure: CYSTOSCOPY RETROGRADE PYELOGRAM WITH INSERTION STENT URETERAL;  Surgeon: José Luis Stephens MD;  [facility];  Service: Urology    MI INSJ TUNNELED CTR VAD W/SUBQ PORT AGE 5 YR/> N/A 03/21/2023    Procedure: INSERTION VENOUS PORT ( PORT-A-CATH) IR;  Surgeon: Mark Amos DO;  [facility];  Service: Interventional Radiology    MI LAPS COLECTOMY PRTL W/COLOPXTSTMY LW ANAST N/A 08/17/2023    Procedure: RESECTION COLON LOW ANTERIOR LAPAROSCOPIC WITH ROBOTICS;  Surgeon: Sree Umanzor MD;  [facility];  Service: Colorectal    SKIN LESION EXCISION Left 05/01/2025    Procedure: EXCISION OF DERMAL CYSTS LEFT BREAST, LEFT BACK WITH COMPLEX CLOSURE.;  Surgeon: Sincere Johnston MD;  Location[facility];   "Service: Plastics    TONSILLECTOMY      URINARY SURGERY Bilateral     bilateral stents   [3]   Current Facility-Administered Medications:     acetaminophen (TYLENOL) tablet 650 mg, 650 mg, Oral, Q4H PRN, RUBEN Prieto-C    dextrose 5 % and sodium chloride 0.9 % infusion, 100 mL/hr, Intravenous, Continuous, Jack Canchola MD, Last Rate: 100 mL/hr at 07/19/25 0206, 100 mL/hr at 07/19/25 0206    fenofibrate (TRICOR) tablet 145 mg, 145 mg, Oral, Daily, Tarsha Medellin PA-C    gabapentin (NEURONTIN) capsule 300 mg, 300 mg, Oral, HS, Tarsha Medellin, PA-C, 300 mg at 07/19/25 0207    HYDROcodone-acetaminophen (NORCO) 5-325 mg per tablet 1 tablet, 1 tablet, Oral, Q6H PRN, Tarsha Mortons, PA-C    HYDROcodone-acetaminophen (NORCO) 5-325 mg per tablet 2 tablet, 2 tablet, Oral, Q6H PRN, Tarsha Medellin PA-C    HYDROmorphone (DILAUDID) injection 0.5 mg, 0.5 mg, Intravenous, Q3H PRN, Tarsha Medellin PA-C    ondansetron (ZOFRAN) injection 4 mg, 4 mg, Intravenous, Q6H PRN, Tarsha Mortons, PA-C    tamsulosin (FLOMAX) capsule 0.4 mg, 0.4 mg, Oral, Daily With Dinner, Tarsha Medellin PA-C    ursodiol (ACTIGALL) capsule 900 mg, 900 mg, Oral, Daily, Tarsha Medellin PA-C  [4]   Allergies  Allergen Reactions    Oxaliplatin Shortness Of Breath     Reactions occurred with 2nd and 3rd infusions (about 1-3 hours from initiation of infusion) and required treatment with steroids and antihistamines. Please refer to allergy note on 2/7/2023 for detailed description of her reactions.    Cefazolin Hives    Morphine Nausea Only     \"Every dose made me nauseous\" (oral dosing only, can tolerate IV morphine)    Potassium Chloride Other (See Comments)     Pt reports \"burning\" with Iv potassium administration and wishes for it to be added to chart   [5]   Social History  Tobacco Use    Smoking status: Never     Passive exposure: Past    Smokeless tobacco: Never    Tobacco comments:     Never a smoker or use of any " tobacco products per pt    Vaping Use    Vaping status: Never Used   Substance Use Topics    Alcohol use: Never     Comment: Denies any alcohol use per pt    Drug use: Never     Comment: Denies any drug use per pt   [6]   Social History  Tobacco Use   Smoking Status Never    Passive exposure: Past   Smokeless Tobacco Never   Tobacco Comments    Never a smoker or use of any tobacco products per pt    [7]   Family History  Problem Relation Name Age of Onset    Cancer Mother Saar     Cirrhosis Father none     Diabetes Father none     Breast cancer Neg Hx      Breast cancer additional onset Neg Hx      Anesthesia problems Neg Hx

## 2025-07-19 NOTE — ANESTHESIA POSTPROCEDURE EVALUATION
Post-Op Assessment Note    CV Status:  Stable  Pain Score: 0    Pain management: adequate       Mental Status:  Arousable and sleepy   Hydration Status:  Euvolemic   PONV Controlled:  Controlled   Airway Patency:  Patent     Post Op Vitals Reviewed: Yes    No anethesia notable event occurred.    Staff: CRNA           Last Filed PACU Vitals:  Vitals Value Taken Time   Temp 98.5 °F (36.9 °C) 07/19/25 09:50   Pulse 78 07/19/25 09:50   /61 07/19/25 09:50   Resp 10 07/19/25 09:50   SpO2 99 % 07/19/25 09:50

## 2025-07-19 NOTE — ED PROVIDER NOTES
Time reflects when diagnosis was documented in both MDM as applicable and the Disposition within this note       Time User Action Codes Description Comment    7/19/2025  1:57 AM Bo Hurley Add [N13.1] Hydronephrosis due to obstruction of ureter     7/19/2025  1:57 AM Bo Hurley Add [R91.8] Pulmonary nodules           ED Disposition       ED Disposition   Admit    Condition   Stable    Date/Time   Sat Jul 19, 2025  1:59 AM    Comment   Case was discussed with Tarsha Medellin and the patient's admission status was agreed to be Admission Status: inpatient status to the service of Dr. Stephens.                Assessment & Plan       Medical Decision Making  Pt presented with severe R flank pain with a hx of R nephrolithiasis and ureteral strictures with indwelling stents concerning for renal colic and possible ureteral obstruction. Pt also has hx of cancer and chemoradiation tx concerning for metastasis and kidney damage. Labwork showed improvements from previous baseline except for an elevation of creatinine and calcium levels indicating kidney injury and possible changes in cancer status, respectively. CT showed severe right and mild left hydronephrosis with ureteral stents in place and some fat stranding. Urology consulted and agreed to receive her for admission and plan for OR with Dr. Stephens later today. CT also showed innumerable pulmonary nodules, some of which appear mildly enlarged compared to CT of 4/16/2024, for which patient was advised to follow up with oncology team for expedited analysis of condition. Pt agrees with plans for admission to urology and outpatient follow up with oncology.         Amount and/or Complexity of Data Reviewed  Labs: ordered.  Radiology: ordered.    Risk  Prescription drug management.  Decision regarding hospitalization.        ED Course as of 07/19/25 0159   Fri Jul 18, 2025   2349 Tolerating pain medicine well.       Medications   dextrose 5 % and sodium  "chloride 0.9 % infusion (has no administration in time range)   ondansetron (ZOFRAN) injection 4 mg (has no administration in time range)   acetaminophen (TYLENOL) tablet 650 mg (has no administration in time range)   fenofibrate (TRICOR) tablet 145 mg (has no administration in time range)   gabapentin (NEURONTIN) capsule 300 mg (has no administration in time range)   tamsulosin (FLOMAX) capsule 0.4 mg (has no administration in time range)   ursodiol (ACTIGALL) capsule 900 mg (has no administration in time range)   HYDROcodone-acetaminophen (NORCO) 5-325 mg per tablet 1 tablet (has no administration in time range)   HYDROcodone-acetaminophen (NORCO) 5-325 mg per tablet 2 tablet (has no administration in time range)   HYDROmorphone (DILAUDID) injection 0.5 mg (has no administration in time range)   morphine injection 4 mg (4 mg Intravenous Given 7/18/25 2259)   ketorolac (TORADOL) injection 15 mg (15 mg Intravenous Given 7/18/25 2257)   iohexol (OMNIPAQUE) 350 MG/ML injection (SINGLE-DOSE) 80 mL (80 mL Intravenous Given 7/19/25 0007)       ED Risk Strat Scores                    No data recorded                            History of Present Illness       Chief Complaint   Patient presents with    Back Pain     Pt comes to ED c/o R sided back pain starting today. Pt states she has hx of kidney issues and she states \"I haven't had this pain for months.\"       Past Medical History[1]   Past Surgical History[2]   Family History[3]   Social History[4]   E-Cigarette/Vaping    E-Cigarette Use Never User     Comments Denies any use per pt       E-Cigarette/Vaping Substances      I have reviewed and agree with the history as documented.     60 yo woman presented for progressive R flank pain that began around 12pm earlier today. She endorses some dysuria and denies hematuria and abdominal pain. She states that she is making urine well. She is being followed by urology for ureteral strictures and has indwelling stents as of " 6/2025. US on 6/18/25 showed non-obstructing 8 mm and 10 mm lower pole calculi. She has a hx of rectal CA for which she has received chemoradiation. No recent illness or fevers.       Back Pain  Associated symptoms: dysuria    Associated symptoms: no pelvic pain        Review of Systems   Constitutional: Negative.    HENT: Negative.     Respiratory: Negative.     Cardiovascular: Negative.    Gastrointestinal: Negative.    Genitourinary:  Positive for dysuria and flank pain. Negative for difficulty urinating and pelvic pain.           Objective       ED Triage Vitals [07/18/25 2055]   Temperature Pulse Blood Pressure Respirations SpO2 Patient Position - Orthostatic VS   98.8 °F (37.1 °C) 77 150/79 18 100 % --      Temp Source Heart Rate Source BP Location FiO2 (%) Pain Score    Oral Monitor -- -- 7      Vitals      Date and Time Temp Pulse SpO2 Resp BP Pain Score FACES Pain Rating User   07/18/25 2259 -- -- -- -- -- 8 -- TW   07/18/25 2257 -- -- -- -- -- 7 -- TW   07/18/25 2055 98.8 °F (37.1 °C) 77 100 % 18 150/79 7 -- CH            Physical Exam  Vitals and nursing note reviewed.   Constitutional:       General: She is in acute distress.   HENT:      Head: Normocephalic and atraumatic.      Mouth/Throat:      Mouth: Mucous membranes are moist.     Eyes:      Extraocular Movements: Extraocular movements intact.      Conjunctiva/sclera: Conjunctivae normal.       Cardiovascular:      Rate and Rhythm: Normal rate and regular rhythm.      Pulses: Normal pulses.      Heart sounds: Normal heart sounds.   Pulmonary:      Effort: Pulmonary effort is normal.      Breath sounds: Normal breath sounds.   Abdominal:      General: Abdomen is flat. There is no distension.      Palpations: Abdomen is soft.      Tenderness: There is no abdominal tenderness. There is right CVA tenderness. There is no left CVA tenderness.      Comments: Well-appearing colostomy bag in place.     Musculoskeletal:      Cervical back: Normal range of  motion and neck supple.     Skin:     General: Skin is warm and dry.     Neurological:      General: No focal deficit present.      Mental Status: She is alert and oriented to person, place, and time. Mental status is at baseline.     Psychiatric:         Mood and Affect: Mood normal.         Behavior: Behavior normal.         Thought Content: Thought content normal.         Judgment: Judgment normal.         Results Reviewed       Procedure Component Value Units Date/Time    Urine Microscopic [[id number]]  (Abnormal) Collected: 07/18/25 2256    Lab Status: Final result Specimen: Urine, Clean Catch Updated: 07/19/25 0036     RBC, UA Innumerable /hpf      WBC, UA Innumerable /hpf      Epithelial Cells Occasional /hpf      Bacteria, UA Occasional /hpf     Urine culture [[id number]] Collected: 07/18/25 2256    Lab Status: In process Specimen: Urine, Clean Catch Updated: 07/19/25 0036    UA w Reflex to Microscopic w Reflex to Culture [[id number]]  (Abnormal) Collected: 07/18/25 2256    Lab Status: Final result Specimen: Urine, Clean Catch Updated: 07/19/25 0017     Color, UA Light Yellow     Clarity, UA Turbid     Specific Gravity, UA 1.013     pH, UA 6.0     Leukocytes, UA Large     Nitrite, UA Negative     Protein, UA 50 (1+) mg/dl      Glucose, UA Negative mg/dl      Ketones, UA Negative mg/dl      Urobilinogen, UA <2.0 mg/dl      Bilirubin, UA Negative     Occult Blood, UA Large    RBC Morphology Reflex Test [[id number]] Collected: 07/18/25 2256    Lab Status: Final result Specimen: Blood from Arm, Right Updated: 07/19/25 0002    CBC and differential [[id number]]  (Abnormal) Collected: 07/18/25 2256    Lab Status: Final result Specimen: Blood from Arm, Right Updated: 07/18/25 2354     WBC 8.46 Thousand/uL      RBC 3.11 Million/uL      Hemoglobin 9.9 g/dL      Hematocrit 29.2 %      MCV 94 fL      MCH 31.8 pg      MCHC 33.9 g/dL      RDW 12.6 %      MPV 9.6 fL      Platelets 225 Thousands/uL     Narrative:      This is an  appended report.  These results have been appended to a previously verified report.    Manual Differential(PHLEBS Do Not Order) [[id number]]  (Abnormal) Collected: 07/18/25 2256    Lab Status: Final result Specimen: Blood from Arm, Right Updated: 07/18/25 9057     Segmented % 90 %      Lymphocytes % 3 %      Monocytes % 5 %      Eosinophils % 2 %      Basophils % 0 %      Absolute Neutrophils 7.61 Thousand/uL      Absolute Lymphocytes 0.25 Thousand/uL      Absolute Monocytes 0.42 Thousand/uL      Absolute Eosinophils 0.17 Thousand/uL      Absolute Basophils 0.00 Thousand/uL      Total Counted --     RBC Morphology Present     Platelet Estimate Adequate     Anisocytosis Present    Comprehensive metabolic panel [[id number]]  (Abnormal) Collected: 07/18/25 2256    Lab Status: Final result Specimen: Blood from Arm, Right Updated: 07/18/25 2320     Sodium 139 mmol/L      Potassium 4.1 mmol/L      Chloride 104 mmol/L      CO2 26 mmol/L      ANION GAP 9 mmol/L      BUN 31 mg/dL      Creatinine 1.42 mg/dL      Glucose 128 mg/dL      Calcium 11.0 mg/dL      AST 25 U/L      ALT 24 U/L      Alkaline Phosphatase 95 U/L      Total Protein 7.2 g/dL      Albumin 4.3 g/dL      Total Bilirubin 0.49 mg/dL      eGFR 41 ml/min/1.73sq m     Narrative:      National Kid[employer] guidelines for Chronic Kidney Disease (CKD):     Stage 1 with normal or high GFR (GFR > 90 mL/min/1.73 square meters)    Stage 2 Mild CKD (GFR = 60-89 mL/min/1.73 square meters)    Stage 3A Moderate CKD (GFR = 45-59 mL/min/1.73 square meters)    Stage 3B Moderate CKD (GFR = 30-44 mL/min/1.73 square meters)    Stage 4 Severe CKD (GFR = 15-29 mL/min/1.73 square meters)    Stage 5 End Stage CKD (GFR <15 mL/min/1.73 square meters)  Note: GFR calculation is accurate only with a steady state creatinine            CT chest abdomen pelvis w contrast   ED Interpretation by Bo Hurley MD (07/19 0116)   CHEST     LUNGS: Innumerable bilateral pulmonary  nodules. Medial right lower lobe subpleural pulmonary nodule appears slightly more solid and slightly enlarged compared to the prior study, now measuring up to 0.9 cm (302, 176), previously measuring up to 0.7 cm   when measured in a similar fashion. There is also mild enlargement of a left lower lobe pulmonary nodule now measuring up to 1 cm (302, 159), also appearing more solid, previously measuring up to 0.8 cm when measured in a similar fashion.     PLEURA: Unremarkable.     HEART/GREAT VESSELS: Heart is unremarkable for patient's age. No thoracic aortic aneurysm.     MEDIASTINUM AND DUSTY: Unremarkable.     CHEST WALL AND LOWER NECK: Stable right thyroid nodule. Right chest Mediport     ABDOMEN     LIVER/BILIARY TREE: Unremarkable.     GALLBLADDER: No calcified gallstones. No pericholecystic inflammatory change.     SPLEEN: Unremarkable.     PANCREAS: Unremarkable.     ADRENAL GLANDS: Unremarkable.     KIDNEYS/URETERS: Severe right    hydronephrosis with ureteral stent in place. Mild left hydronephrosis with ureteral stent in place. Fat stranding about the bilateral renal pelvis.     Multiple layering nonobstructing calculi in the right renal collecting system approximately 4-5 in number, measuring up to 0.6 cm. Punctate nonobstructing left lower pole calculus.     STOMACH AND BOWEL: Rectosigmoid anastomosis. No evidence of bowel obstruction. Right upper quadrant loop ileostomy     APPENDIX: No findings to suggest appendicitis.     ABDOMINOPELVIC CAVITY: No ascites. No pneumoperitoneum. No lymphadenopathy.     VESSELS: Atherosclerosis without abdominal aortic aneurysm.     PELVIS     REPRODUCTIVE ORGANS: Unremarkable for patient's age.     URINARY BLADDER: Unremarkable.     ABDOMINAL WALL/INGUINAL REGIONS: Unremarkable.     BONES: No acute fracture or suspicious osseous lesion. Spinal degenerative changes.     IMPRESSION:     Severe right and mild left hydronephrosis with ureteral stents in place. Correlate for  negro   nt dysfunction fat stranding about the bilateral renal pelvis likely related to inflammation.     Innumerable pulmonary nodules, some of which appear mildly enlarged compared to CT of 4/16/2024. Attention on follow-up.        Final Interpretation by Hiro Lama DO (07/19 0109)      Severe right and mild left hydronephrosis with ureteral stents in place. Correlate for stent dysfunction fat stranding about the bilateral renal pelvis likely related to inflammation.      Innumerable pulmonary nodules, some of which appear mildly enlarged compared to CT of 4/16/2024. Attention on follow-up.            Computerized Assisted Algorithm (CAA) may have aided analysis of applicable images.      The study was marked in EPIC for immediate notification.      Workstation performed: [id number]             Procedures    ED Medication and Procedure Management   Prior to Admission Medications   Prescriptions Last Dose Informant Patient Reported? Taking?   Melatonin 5 MG TABS   Yes No   Sig: Take by mouth daily at bedtime   Patient not taking: Reported on 6/12/2025   acetaminophen (TYLENOL) 325 mg tablet  Self No No   Sig: Take 2 tablets (650 mg total) by mouth every 4 (four) hours as needed for mild pain   Patient not taking: Reported on 6/4/2025   cyanocobalamin (VITAMIN B-12) 1000 MCG tablet  Self Yes No   Sig: Take 1,000 mcg by mouth in the morning.   Patient not taking: Reported on 6/12/2025   docusate sodium (COLACE) 100 mg capsule   No No   Sig: Take 1 capsule (100 mg total) by mouth 2 (two) times a day for 15 days   fenofibrate (TRICOR) 145 mg tablet  Self No No   Sig: Take 1 tablet (145 mg total) by mouth daily   ferrous sulfate 324 (65 Fe) mg  Self No No   Sig: Take 1 tablet (324 mg total) by mouth daily before breakfast   Patient not taking: Reported on 6/12/2025   folic acid (FOLVITE) 1 mg tablet  Self No No   Sig: Take 1 tablet (1 mg total) by mouth daily   Patient not taking: Reported on 6/12/2025   gabapentin  (Neurontin) 300 mg capsule   No No   Sig: Take 1 capsule (300 mg total) by mouth daily at bedtime   hydrocortisone (ANUSOL-HC) 2.5 % rectal cream  Self No No   Sig: Apply topically 2 (two) times a day for 5 days   methotrexate 2.5 MG tablet   No No   Sig: Take 6 tablets (15 mg total) by mouth once a week   tamsulosin (FLOMAX) 0.4 mg   No No   Sig: Take 1 capsule (0.4 mg total) by mouth daily with dinner   ursodiol (ACTIGALL) 300 mg capsule  Self No No   Sig: TAKE 3 CAPSULES (900 MG TOTAL) BY MOUTH IN THE MORNING      Facility-Administered Medications: None     Patient's Medications   Discharge Prescriptions    No medications on file     No discharge procedures on file.  ED SEPSIS DOCUMENTATION   Time reflects when diagnosis was documented in both MDM as applicable and the Disposition within this note       Time User Action Codes Description Comment    7/19/2025  1:57 AM Bo Hurley Add [N13.1] Hydronephrosis due to obstruction of ureter     7/19/2025  1:57 AM Bo Hurley Add [R91.8] Pulmonary nodules                      [1]   Past Medical History:  Diagnosis Date    Anemia     Bleeding from colostomy stoma (HCC) 02/11/2023     states ileostomy , not colostomy    Cancer (HCC)     Chronic kidney disease     Colon cancer (HCC)     Cyst (solitary) of breast, left     Dermoid cyst of neck     RIGHT    Dermoid cyst of skin of back     LEFT    Elevated serum creatinine 09/11/2024    Fall     Headache     Hemochromatosis, unspecified     History of transfusion     within 2 months 4/2/2025    Hyperlipidemia     Hypokalemia 09/12/2024    Kidney calculi     Kidney stone     Liver disease     hemangioma    Muscle weakness     Personal history of COVID-19 12/2022    Sarcoidosis     Seizures (HCC)     2022-- one seizure at that time - no further seizures at this time    Shingles     Ureteral stent present    [2]   Past Surgical History:  Procedure Laterality Date    ABSCESS DRAINAGE      left breast    BREAST  BIOPSY      CHEST TUBE INSERTION      COLON SURGERY      colostomy    COLONOSCOPY      FACIAL/NECK BIOPSY Right 05/01/2025    Procedure: EXCISION OF DERMAL CYST RIGHT NECK WITH COMPLEX CLOSURE;  Surgeon: Sincere Johnston MD;  Location: [facility];  Service: Plastics    FL GUIDED CENTRAL VENOUS ACCESS DEVICE INSERTION  03/21/2023    FL RETROGRADE PYELOGRAM  01/23/2023    FL RETROGRADE PYELOGRAM  04/03/2023    FL RETROGRADE PYELOGRAM  07/21/2023    FL RETROGRADE PYELOGRAM  10/11/2023    FL RETROGRADE PYELOGRAM  12/15/2023    FL RETROGRADE PYELOGRAM  02/08/2024    FL RETROGRADE PYELOGRAM  05/10/2024    FL RETROGRADE PYELOGRAM  09/26/2024    FL RETROGRADE PYELOGRAM  10/18/2024    FL RETROGRADE PYELOGRAM  01/17/2025    FL RETROGRADE PYELOGRAM  04/11/2025    FL RETROGRADE PYELOGRAM  6/13/2025    ILEOSTOMY      IR BIOPSY LIVER MASS  05/09/2023    LUNG BIOPSY      VT CYSTO W/INSERT URETERAL STENT Bilateral 05/10/2024    Procedure: EXCHANGE STENT URETERAL;  Surgeon: José Luis Stephens MD;  [facility];  Service: Urology    VT CYSTO W/INSERT URETERAL STENT Bilateral 10/18/2024    Procedure: EXCHANGE STENT URETERAL;  Surgeon: José Luis Stephens MD;  [facility];  Service: Urology    VT CYSTO W/INSERT URETERAL STENT Bilateral 04/11/2025    Procedure: EXCHANGE STENT URETERAL;  Surgeon: José Luis Stephens MD;  [facility];  Service: Urology    VT CYSTO W/INSERT URETERAL STENT Right 6/13/2025    Procedure: EXCHANGE STENT URETERAL;  Surgeon: José Luis Stephens MD;  [facility];  Service: Urology    VT CYSTO/URETERO W/LITHOTRIPSY &INDWELL STENT INSRT Bilateral 01/23/2023    Procedure: CYSTOSCOPY  RIGHT URETEROSCOPY WITH LITHOTRIPSY HOLMIUM LASER, BILATERAL RETROGRADE PYELOGRAM AND BILATERAL  URETERAL STENT INSERTION;  Surgeon: José Luis Stephens MD;  [facility];  Service: Urology    VT CYSTO/URETERO W/LITHOTRIPSY &INDWELL STENT INSRT Right 04/03/2023    Procedure:  RIGHT URETEROSCOPY WITH LITHOTRIPSY HOLMIUM LASER, RETROGRADE PYELOGRAM; BILATERAL EXCHANGE STENT URETERAL;  Surgeon: José Luis Stephens MD;  [facility];  Service: Urology    AZ CYSTO/URETERO W/LITHOTRIPSY &INDWELL STENT INSRT Bilateral 10/11/2023    Procedure: CYSTOSCOPY URETEROSCOPY RETROGRADE PYELOGRAM AND INSERTION STENT URETERAL DIAGNOSTINC RIGHT URETEROSCOPY, REMOVAL STENT LEFT SIDE;  Surgeon: José Luis Stephens MD;  [facility];  Service: Urology    AZ CYSTO/URETERO W/LITHOTRIPSY &INDWELL STENT INSRT Bilateral 09/26/2024    Procedure: CYSTOSCOPY ,LEFT  URETEROSCOPY,  BILATERAL  RETROGRADE PYELOGRAM AND BILATERAL URETERAL STENT EXCHANGE;  Surgeon: Favian Barber MD;  [facility];  Service: Urology    AZ CYSTO/URETERO W/LITHOTRIPSY &INDWELL STENT INSRT Left 10/18/2024    Procedure: CYSTOSCOPY LEFT URETEROSCOPY, treatment of LEFT ureteral stricture, RETROGRADE PYELOGRAM AND BILATERAL STENT EXCHANGE;  Surgeon: José Luis Stephens MD;  [facility];  Service: Urology    AZ CYSTO/URETERO W/LITHOTRIPSY &INDWELL STENT INSRT Left 04/11/2025    Procedure: cysto/ L. RGP/ L. URS with lithotripsy and L. stent insertion;  Surgeon: José Luis Stephens MD;  [facility];  Service: Urology    AZ CYSTO/URETERO W/LITHOTRIPSY &INDWELL STENT INSRT Left 6/13/2025    Procedure: CYSTOSCOPY URETEROSCOPY, RETROGRADE PYELOGRAM AND INSERTION STENT URETERAL, right ureteral stent exchange;  Surgeon: José Luis Stephens MD;  [facility];  Service: Urology    AZ CYSTOURETHROSCOPY W/URETERAL CATHETERIZATION Bilateral 07/21/2023    Procedure: CYSTOSCOPY URETEROSCOPY RETROGRADE PYELOGRAM WITH BILATERAL STENT URETERAL EXCHANGE; LEFT LASER LITHOTRIPSY AND STONE BASKET RETRIEVAL;  Surgeon: José Luis Stephens MD;  [facility];  Service: Urology    AZ CYSTOURETHROSCOPY W/URETERAL CATHETERIZATION Bilateral 12/15/2023    Procedure: CYSTOSCOPY, BILATERAL   RETROGRADE PYELOGRAM , STENT EXCHANGE RIGHT , LEFT URETEROSCOPY ,  HOLMIUM LASER WITH INSERTION LEFT URETERAL STENT;  Surgeon: Derick Bhakta MD;  [facility];  Service: Urology    OH CYSTOURETHROSCOPY W/URETERAL CATHETERIZATION Left 02/08/2024    Procedure: CYSTOSCOPY B/L RETROGRADE PYELOGRAM WITH B/L T URETERALSTENT EXCHANGE,LEFT URETEROSCOPY, DILATION LEFT URETERAL STICTURE;  Surgeon: José Luis Stephens MD;  [facility];  Service: Urology    OH CYSTOURETHROSCOPY W/URETERAL CATHETERIZATION Bilateral 05/10/2024    Procedure: CYSTOSCOPY RETROGRADE PYELOGRAM WITH INSERTION STENT URETERAL;  Surgeon: José Luis Stephens MD;  [facility];  Service: Urology    OH CYSTOURETHROSCOPY W/URETERAL CATHETERIZATION Right 01/17/2025    Procedure: CYSTOSCOPY RETROGRADE PYELOGRAM WITH INSERTION STENT URETERAL, LEFT RETROGRADE PYELOGRAM AND STENT REMOVAL;  Surgeon: José Luis Stephens MD;  [facility];  Service: Urology    OH CYSTOURETHROSCOPY W/URETERAL CATHETERIZATION Bilateral 04/11/2025    Procedure: CYSTOSCOPY RETROGRADE PYELOGRAM WITH INSERTION STENT URETERAL;  Surgeon: José Luis Stephens MD;  [facility];  Service: Urology    OH INSJ TUNNELED CTR VAD W/SUBQ PORT AGE 5 YR/> N/A 03/21/2023    Procedure: INSERTION VENOUS PORT ( PORT-A-CATH) IR;  Surgeon: Mark Amos DO;  [facility];  Service: Interventional Radiology    OH LAPS COLECTOMY PRTL W/COLOPXTSTMY LW ANAST N/A 08/17/2023    Procedure: RESECTION COLON LOW ANTERIOR LAPAROSCOPIC WITH ROBOTICS;  Surgeon: Sree Umanzor MD;  [facility];  Service: Colorectal    SKIN LESION EXCISION Left 05/01/2025    Procedure: EXCISION OF DERMAL CYSTS LEFT BREAST, LEFT BACK WITH COMPLEX CLOSURE.;  Surgeon: Sincere Johnston MD;  [facility];  Service: Plastics    TONSILLECTOMY      URINARY SURGERY Bilateral     bilateral stents   [3]   Family History  Problem Relation Name Age of Onset     Cancer Mother Saar     Cirrhosis Father none     Diabetes Father none     Breast cancer Neg Hx      Breast cancer additional onset Neg Hx      Anesthesia problems Neg Hx     [4]   Social History  Tobacco Use    Smoking status: Never     Passive exposure: Past    Smokeless tobacco: Never    Tobacco comments:     Never a smoker or use of any tobacco products per pt    Vaping Use    Vaping status: Never Used   Substance Use Topics    Alcohol use: Never     Comment: Denies any alcohol use per pt    Drug use: Never     Comment: Denies any drug use per pt        Bo Hurley MD  07/19/25 0201

## 2025-07-19 NOTE — PLAN OF CARE
Problem: PAIN - ADULT  Goal: Verbalizes/displays adequate comfort level or baseline comfort level  Description: Interventions:  - Encourage patient to monitor pain and request assistance  - Assess pain using appropriate pain scale  - Administer analgesics as ordered based on type and severity of pain and evaluate response  - Implement non-pharmacological measures as appropriate and evaluate response  - Consider cultural and social influences on pain and pain management  - Notify physician/advanced practitioner if interventions unsuccessful or patient reports new pain  - Educate patient/family on pain management process including their role and importance of  reporting pain   - Provide non-pharmacologic/complimentary pain relief interventions  Outcome: Progressing     Problem: INFECTION - ADULT  Goal: Absence or prevention of progression during hospitalization  Description: INTERVENTIONS:  - Assess and monitor for signs and symptoms of infection  - Monitor lab/diagnostic results  - Monitor all insertion sites, i.e. indwelling lines, tubes, and drains  - Monitor endotracheal if appropriate and nasal secretions for changes in amount and color  - Ulysses appropriate cooling/warming therapies per order  - Administer medications as ordered  - Instruct and encourage patient and family to use good hand hygiene technique  - Identify and instruct in appropriate isolation precautions for identified infection/condition  Outcome: Progressing  Goal: Absence of fever/infection during neutropenic period  Description: INTERVENTIONS:  - Monitor WBC  - Perform strict hand hygiene  - Limit to healthy visitors only  - No plants, dried, fresh or silk flowers with bonilla in patient room  Outcome: Progressing     Problem: SAFETY ADULT  Goal: Patient will remain free of falls  Description: INTERVENTIONS:  - Educate patient/family on patient safety including physical limitations  - Instruct patient to call for assistance with activity   -  Consider consulting OT/PT to assist with strengthening/mobility based on AM PAC & JH-HLM score  - Consult OT/PT to assist with strengthening/mobility   - Keep Call bell within reach  - Keep bed low and locked with side rails adjusted as appropriate  - Keep care items and personal belongings within reach  - Initiate and maintain comfort rounds  - Make Fall Risk Sign visible to staff  - Apply yellow socks and bracelet for high fall risk patients  - Consider moving patient to room near nurses station  Outcome: Progressing  Goal: Maintain or return to baseline ADL function  Description: INTERVENTIONS:  -  Assess patient's ability to carry out ADLs; assess patient's baseline for ADL function and identify physical deficits which impact ability to perform ADLs (bathing, care of mouth/teeth, toileting, grooming, dressing, etc.)  - Assess/evaluate cause of self-care deficits   - Assess range of motion  - Assess patient's mobility; develop plan if impaired  - Assess patient's need for assistive devices and provide as appropriate  - Encourage maximum independence but intervene and supervise when necessary  - Involve family in performance of ADLs  - Assess for home care needs following discharge   - Consider OT consult to assist with ADL evaluation and planning for discharge  - Provide patient education as appropriate  - Monitor functional capacity and physical performance, use of AM PAC & JH-HLM   - Monitor gait, balance and fatigue with ambulation    Outcome: Progressing  Goal: Maintains/Returns to pre admission functional level  Description: INTERVENTIONS:  - Perform AM-PAC 6 Click Basic Mobility/ Daily Activity assessment daily.  - Set and communicate daily mobility goal to care team and patient/family/caregiver.   - Collaborate with rehabilitation services on mobility goals if consulted  - Out of bed for toileting  - Record patient progress and toleration of activity level   Outcome: Progressing     Problem: DISCHARGE  PLANNING  Goal: Discharge to home or other facility with appropriate resources  Description: INTERVENTIONS:  - Identify barriers to discharge w/patient and caregiver  - Arrange for needed discharge resources and transportation as appropriate  - Identify discharge learning needs (meds, wound care, etc.)  - Arrange for interpretive services to assist at discharge as needed  - Refer to Case Management Department for coordinating discharge planning if the patient needs post-hospital services based on physician/advanced practitioner order or complex needs related to functional status, cognitive ability, or social support system  Outcome: Progressing     Problem: Knowledge Deficit  Goal: Patient/family/caregiver demonstrates understanding of disease process, treatment plan, medications, and discharge instructions  Description: Complete learning assessment and assess knowledge base.  Interventions:  - Provide teaching at level of understanding  - Provide teaching via preferred learning methods  Outcome: Progressing

## 2025-07-19 NOTE — ANESTHESIA POSTPROCEDURE EVALUATION
Post-Op Assessment Note    CV Status:  Stable  Pain Score: 0    Pain management: adequate       Mental Status:  Alert and awake   Hydration Status:  Euvolemic   PONV Controlled:  Controlled   Airway Patency:  Patent     Post Op Vitals Reviewed: Yes    No anethesia notable event occurred.            Last Filed PACU Vitals:  Vitals Value Taken Time   Temp 98.5 °F (36.9 °C) 07/19/25 10:20   Pulse 71 07/19/25 10:20   /58 07/19/25 10:22   Resp 18 07/19/25 10:20   SpO2 95 % 07/19/25 10:20       Modified Say:     Vitals Value Taken Time   Activity 2 07/19/25 10:20   Respiration 2 07/19/25 10:20   Circulation 2 07/19/25 10:20   Consciousness 2 07/19/25 10:20   Oxygen Saturation 2 07/19/25 10:20     Modified Say Score: 10

## 2025-07-19 NOTE — QUICK NOTE
Patient is a 59-year-old female with a history of colorectal cancer status post prior radiation treatments now with ureteral strictures managed with bilateral ureteral stents.  Last underwent cystoscopy ureteroscopy retrograde pyelogram and ureteral stent insertion right ureteral stent exchange 6/13/2025 with plan for ureteral stent exchanges every 4 months.    Senting to the emergency room due to progressive and worsening right-sided flank pain.  Upon evaluation in the ED patient is afebrile hemodynamically stable.    No leukocytosis.    Mild THOR of 1.4. stage 3 CKD baseline 1-1.1 Patient also received Toradol and contrast on imaging.  Currently on IV fluids.    Lab Results   Component Value Date    WBC 8.46 07/18/2025    HGB 9.9 (L) 07/18/2025    HCT 29.2 (L) 07/18/2025    MCV 94 07/18/2025     07/18/2025     Lab Results   Component Value Date    SODIUM 139 07/18/2025    K 4.1 07/18/2025     07/18/2025    CO2 26 07/18/2025    BUN 31 (H) 07/18/2025    CREATININE 1.42 (H) 07/18/2025    GLUC 128 07/18/2025    CALCIUM 11.0 (H) 07/18/2025         CT scan on admission revealing severe right-sided hydronephrosis with ureteral stent in place with mild left-sided hydronephrosis with ureteral stent in place fat stranding about the bilateral renal pelvis.  There is concern for stent malfunction.          CHEST     LUNGS: Innumerable bilateral pulmonary nodules. Medial right lower lobe subpleural pulmonary nodule appears slightly more solid and slightly enlarged compared to the prior study, now measuring up to 0.9 cm (302, 176), previously measuring up to 0.7 cm  when measured in a similar fashion. There is also mild enlargement of a left lower lobe pulmonary nodule now measuring up to 1 cm (302, 159), also appearing more solid, previously measuring up to 0.8 cm when measured in a similar fashion.     PLEURA: Unremarkable.     HEART/GREAT VESSELS: Heart is unremarkable for patient's age. No thoracic aortic  aneurysm.     MEDIASTINUM AND DUSTY: Unremarkable.     CHEST WALL AND LOWER NECK: Stable right thyroid nodule. Right chest Mediport     ABDOMEN     LIVER/BILIARY TREE: Unremarkable.     GALLBLADDER: No calcified gallstones. No pericholecystic inflammatory change.     SPLEEN: Unremarkable.     PANCREAS: Unremarkable.     ADRENAL GLANDS: Unremarkable.     KIDNEYS/URETERS: Severe right  hydronephrosis with ureteral stent in place. Mild left hydronephrosis with ureteral stent in place. Fat stranding about the bilateral renal pelvis.     Multiple layering nonobstructing calculi in the right renal collecting system approximately 4-5 in number, measuring up to 0.6 cm. Punctate nonobstructing left lower pole calculus.     STOMACH AND BOWEL: Rectosigmoid anastomosis. No evidence of bowel obstruction. Right upper quadrant loop ileostomy     APPENDIX: No findings to suggest appendicitis.     ABDOMINOPELVIC CAVITY: No ascites. No pneumoperitoneum. No lymphadenopathy.     VESSELS: Atherosclerosis without abdominal aortic aneurysm.     PELVIS     REPRODUCTIVE ORGANS: Unremarkable for patient's age.     URINARY BLADDER: Unremarkable.     ABDOMINAL WALL/INGUINAL REGIONS: Unremarkable.     BONES: No acute fracture or suspicious osseous lesion. Spinal degenerative changes.     IMPRESSION:     Severe right and mild left hydronephrosis with ureteral stents in place. Correlate for negro  nt dysfunction fat stranding about the bilateral renal pelvis likely related to inflammation.     Innumerable pulmonary nodules, some of which appear mildly enlarged compared to CT of 4/16/2024. Attention on follow-up.    Interpreted by Bo Hurley MD on 7/19/2025  1:16 AM   Narrative & Impression   CT CHEST, ABDOMEN AND PELVIS WITH IV CONTRAST     INDICATION: Severe flank pain w/ hx of rectal cancer..     COMPARISON: Multiple priors most recently 6/18/2025 ultrasound     TECHNIQUE: CT examination of the chest, abdomen and pelvis was performed.  Multiplanar 2D reformatted images were created from the source data.     This examination, like all CT scans performed in the [facility], was performed utilizing techniques to minimize radiation dose exposure, including the use of iterative reconstruction and automated exposure control. Radiation dose length   product (DLP) for this visit: 296 mGy-cm.     IV Contrast: 80 mL of iohexol (OMNIPAQUE)  Enteric Contrast: Not administered.     FINDINGS:     CHEST     LUNGS: Innumerable bilateral pulmonary nodules. Medial right lower lobe subpleural pulmonary nodule appears slightly more solid and slightly enlarged compared to the prior study, now measuring up to 0.9 cm (302, 176), previously measuring up to 0.7 cm   when measured in a similar fashion. There is also mild enlargement of a left lower lobe pulmonary nodule now measuring up to 1 cm (302, 159), also appearing more solid, previously measuring up to 0.8 cm when measured in a similar fashion.     PLEURA: Unremarkable.     HEART/GREAT VESSELS: Heart is unremarkable for patient's age. No thoracic aortic aneurysm.     MEDIASTINUM AND DUSTY: Unremarkable.     CHEST WALL AND LOWER NECK: Stable right thyroid nodule. Right chest Mediport     ABDOMEN     LIVER/BILIARY TREE: Unremarkable.     GALLBLADDER: No calcified gallstones. No pericholecystic inflammatory change.     SPLEEN: Unremarkable.     PANCREAS: Unremarkable.     ADRENAL GLANDS: Unremarkable.     KIDNEYS/URETERS: Severe right hydronephrosis with ureteral stent in place. Mild left hydronephrosis with ureteral stent in place. Fat stranding about the bilateral renal pelvis.     Multiple layering nonobstructing calculi in the right renal collecting system approximately 4-5 in number, measuring up to 0.6 cm. Punctate nonobstructing left lower pole calculus.     STOMACH AND BOWEL: Rectosigmoid anastomosis. No evidence of bowel obstruction. Right upper quadrant loop ileostomy     APPENDIX: No findings  to suggest appendicitis.     ABDOMINOPELVIC CAVITY: No ascites. No pneumoperitoneum. No lymphadenopathy.     VESSELS: Atherosclerosis without abdominal aortic aneurysm.     PELVIS     REPRODUCTIVE ORGANS: Unremarkable for patient's age.     URINARY BLADDER: Unremarkable.     ABDOMINAL WALL/INGUINAL REGIONS: Unremarkable.     BONES: No acute fracture or suspicious osseous lesion. Spinal degenerative changes.     IMPRESSION:     Severe right and mild left hydronephrosis with ureteral stents in place. Correlate for stent dysfunction fat stranding about the bilateral renal pelvis likely related to inflammation.     Innumerable pulmonary nodules, some of which appear mildly enlarged compared to CT of 4/16/2024. Attention on follow-up.          Plan:  - Admit to urology team pain control IV fluids overnight. keep n.p.o. for tentative ureteral stent exchange bilaterally in AM.  Will discuss with  attending, there may be a concern for stent failure and possible need for decompression with PCN but ultimately will defer to  attending.  -Formal H&P to be performed in a..    Tarsha Medellin PA-C

## 2025-07-19 NOTE — ED ATTENDING ATTESTATION
7/18/2025  I, Jack Canchola MD, saw and evaluated the patient. I have discussed the patient with the resident/non-physician practitioner and agree with the resident's/non-physician practitioner's findings, Plan of Care, and MDM as documented in the resident's/non-physician practitioner's note, except where noted. All available labs and Radiology studies were reviewed.  I was present for key portions of any procedure(s) performed by the resident/non-physician practitioner and I was immediately available to provide assistance.       At this point I agree with the current assessment done in the Emergency Department.  I have conducted an independent evaluation of this patient a history and physical is as follows:    59-year-old female with a history of rectal cancer managed with prior chemoradiation, ureteral strictures with bilateral ureteral stents in place presented for evaluation of significant right sided flank pain beginning earlier today.  She stated she has had some intermittent flank pain since the stent insertion months ago but today was much worse and longer lasting.  Denies any gross hematuria, nausea, vomiting, fever, chills.    Appeared very uncomfortable on arrival.  Given IV Toradol, morphine.  Pending UA, labs, CT to rule out potential stent obstruction or migration, UTI, or any other acute pathology.    ED Course  ED Course as of 07/19/25 0206   Fri Jul 18, 2025   2335 Patient resting comfortably at this time.   2339 BUN(!): 31   2339 Creatinine(!): 1.42  Elevated from most recent value but appears to be within her typical baseline.   2339 Hemoglobin(!): 9.9  Stable anemia.   Sat Jul 19, 2025   0019 Patient reports she is pain-free at this time.  Pending CT read.   0040 RBC Urine(!): Innumerable   0040 WBC, UA(!): Innumerable   0040 Bacteria, UA: Occasional   0108 UA/micro similar to previous.  Prior cultures have been negative or shown contaminants.   0111 CT shows:    Severe right and mild left  hydronephrosis with ureteral stents in place. Correlate for stent dysfunction fat stranding about the bilateral renal pelvis likely related to inflammation.     Innumerable pulmonary nodules, some of which appear mildly enlarged compared to CT of 4/16/2024. Attention on follow-up.     0150 Resident discussed case with urology team.  Patient's urologist, Dr. Stephens is covering [facility] this weekend and can evaluate patient for stent replacement.  Will be admitting to urology service, keep n.p.o., give maintenance fluids, control pain as needed.         Critical Care Time  Procedures

## 2025-07-19 NOTE — ANESTHESIA PREPROCEDURE EVALUATION
Procedure:  CYSTOSCOPY RETROGRADE PYELOGRAM WITH INSERTION STENT URETERAL, EXCHANGE POSSIBLE BILATERAL (Right: Bladder)    Relevant Problems   CARDIO   (+) Hemorrhoid   (+) Hemorrhoid prolapse   (+) Hypertension   (+) Nonrheumatic mitral valve regurgitation      GI/HEPATIC   (+) History of rectal cancer (HCC)   (+) Malignant neoplasm of sigmoid colon (HCC)   (+) Partial small bowel obstruction (HCC)      /RENAL   (+) THOR (acute kidney injury) (HCC)   (+) Benign hypertension with chronic kidney disease, stage III (HCC)   (+) Bilateral hydronephrosis   (+) CKD stage G3a/A2, GFR 45-59 and albumin creatinine ratio  mg/g (HCC)   (+) CKD stage G3a/A3, GFR 45-59 and albumin creatinine ratio >300 mg/g (HCC)   (+) Chronic kidney disease-mineral bone disorder (CKD-MBD) with stage 3a chronic kidney disease (HCC)   (+) Hydronephrosis due to ureteral stricture   (+) Nephrolithiasis      HEMATOLOGY   (+) Iron deficiency anemia   (+) Pancytopenia (HCC)      MUSCULOSKELETAL   (+) Chronic back pain      NEURO/PSYCH   (+) Anxiety about health   (+) Chronic back pain   (+) Chronic pain after cancer treatment        Physical Exam    Airway     Mallampati score: II  TM Distance: >3 FB  Neck ROM: full      Cardiovascular  Rhythm: regular, Rate: normal    Dental   No notable dental hx     Pulmonary   Breath sounds clear to auscultation    Neurological  - normal exam    Other Findings  post-pubertal.      Anesthesia Plan  ASA Score- 3     Anesthesia Type- general with ASA Monitors.         Additional Monitors:     Airway Plan:            Plan Factors-Exercise tolerance (METS): >4 METS.    Chart reviewed. EKG reviewed.  Existing labs reviewed. Patient summary reviewed.                  Induction- intravenous.    Postoperative Plan- Plan for postoperative opioid use.   Monitoring Plan - Monitoring plan - standard ASA monitoring  Post Operative Pain Plan - non-opiod analgesics and plan for postoperative opioid use    Perioperative  Resuscitation Plan - Level 1 - Full Code.       Informed Consent- Anesthetic plan and risks discussed with patient.  I personally reviewed this patient with the CRNA. Discussed and agreed on the Anesthesia Plan with the CRNA..      NPO Status:  Vitals Value Taken Time   Date of last liquid 07/18/25 07/19/25 08:39   Time of last liquid 2100 07/19/25 08:39   Date of last solid 07/18/25 07/19/25 08:39   Time of last solid 1600 07/19/25 08:39

## 2025-07-19 NOTE — OP NOTE
Operative Note     PATIENT:  Danielle Haque (MRN [id number])    DATE OF PROCEDURE:   7/19/2025    PRE-OP DIAGNOSIS:   1) recalcitrant bilateral ureteral stricture disease  2.  Indwelling bilateral ureteral stents  3.  Right hydronephrosis  4.  Right renal colic  5.  Acute kidney injury    POST-OP DIAGNOSIS:   1) recalcitrant bilateral ureteral stricture disease  2.  Indwelling bilateral ureteral stents  3.  Right hydronephrosis  4.  Right renal colic  5.  Acute kidney injury    PROCEDURES PERFORMED:  1) Cystoscopy  2) bilateral retrograde pyelography with fluoroscopic interpretation  3) bilateral ureteral stent exchange (with placement of two tandem 6 Serbian ureteral stents on the right)    SURGEON:  José Luis Stephens MD    ASSISTANTS:  There were no qualified teaching residents to assist with this case    ANESTHESIA: General     COMPLICATIONS:   None    ANTIBIOTICS:  Cipro 200mg (renally dosed for acute kidney injury)    INTRAOPERATIVE THROMBOEMBOLISM PROPHYLAXIS:  Pneumatic compression stockings      FINDINGS:  Both stents appear to be in proper position radiographically and cystoscopically.  However there is significant right pelvicaliectasis with retained contrast from the patient's overnight CAT scan within the right kidney.  I exchanged both ureteral stents.  There was a hydronephrotic drip from the right side and a urine culture was collected.  Due to the development of obstruction within 1 month of recent stent exchange without distal migration of the stent, I elected to place tandem stents on the right.      PROCEDURE IN DETAIL:   The patient was identified and brought to the OR.  Antibiotic prophylaxis and DVT prophylaxis were administered.  They were placed in the comfortable dorsal lithotomy position with care to pad all pressure points.  They were prepped and draped in the usual sterile fashion using hibiclens.  A surgical time out was performed with all in the room in agreement with the correct  patient, procedure, indications, and laterality.  A 21-Bhutanese rigid cystoscope was used to enter the bladder.  The bladder was inspected in its entirety and there were no lesions noted.  The ureteral orifices were identified in their normal orthotopic positions.     The Left ureteral orifice was identified and a 5 Fr open ended catheter was placed into the ureteral orifice, and a wire was advanced to the proximal ureter alongside the preplaced stent which is then removed..    A retrograde pyelogram was performed with injection of 50/50 Isovue with the findings as described above.  A Sensor wire was up to the kidney under fluoroscopic guidance.  The open-ended catheter is navigated atop the pre-placed wire and advanced to the renal pelvis.     the distance between the UVJ and the UPJ was measured and appropriately sized stent was selected.  The JJ stent was then passed up the wire  under fluoroscopic guidance into the  kidney with a good curl noted in the kidney and in the bladder.   The bladder was drained.      I next turned my attention of the right side which is significantly obstructed on her preoperative CT.  I was able to place a wire to the level of the renal pelvis alongside of the previous stent which was then removed.  I chose to place tandem stents.  A second wire was placed and in parallel using the double bridge 6 x 22 cm stents are placed.  I am able to advance these into the upper pole with some mild to moderate resistance noted at the level of the previously noted stricture in the proximal ureter.  Nevertheless there was perfect appointment of the stents proximally and distally.          The patient was placed back supine, awakened from general anesthesia and brought to recovery room in stable condition.    SPECIMENS:   Order Name Source Comment Collection Info Order Time   URINE CULTURE Urine, Renal, Right  Collected By: José Luis Stephens MD 7/19/2025  9:36 AM     Release to patient through  [facility]   Immediate             IMPLANTS:   Implant Name Type Inv. Item Serial No.  Lot No. LRB No. Used Action   STENT URETERAL 6FR 22CM INLAY OPTIMA W/NITINOL GDWR - [id number]  STENT NITINOL POLYMER OD6 FR L22 CM OPTIMA PUSHER FLUORO MARKER ATRAUMATIC INSERTION URETERAL TAPER LIME GREEN  [facility] [id number] Left 1 Explanted   STENT NITINOL POLYMER OD6 FR L22 CM OPTIMA PUSHER FLUORO MARKER ATRAUMATIC INSERTION URETERAL TAPER LIME GREEN - [id number]  STENT NITINOL POLYMER OD6 FR L22 CM OPTIMA PUSHER FLUORO MARKER ATRAUMATIC INSERTION URETERAL TAPER LIME GREEN  [facility] [id number] Left 1 Implanted   STENT URETERAL 6FR 22CM INLAY OPTIMA W/NITINOL GDWR - [id number]  STENT NITINOL POLYMER OD6 FR L22 CM OPTIMA PUSHER FLUORO MARKER ATRAUMATIC INSERTION URETERAL TAPER LIME GREEN  [facility] [id number] Right 1 Explanted   STENT NITINOL POLYMER OD6 FR L22 CM OPTIMA PUSHER FLUORO MARKER ATRAUMATIC INSERTION URETERAL TAPER LIME GREEN - [id number]  STENT NITINOL POLYMER OD6 FR L22 CM OPTIMA PUSHER FLUORO MARKER ATRAUMATIC INSERTION URETERAL TAPER LIME GREEN  [facility] [id number] Left 1 Implanted   STENT NITINOL POLYMER OD6 FR L22 CM OPTIMA PUSHER FLUORO MARKER ATRAUMATIC INSERTION URETERAL TAPER LIME GREEN - [id number]  STENT NITINOL POLYMER OD6 FR L22 CM OPTIMA PUSHER FLUORO MARKER ATRAUMATIC INSERTION URETERAL TAPER LIME GREEN  [facility] DIVISION [id number] Left 1 Implanted        COMPLICATIONS: None    DISPOSITION: PACU    PLAN: Will return to the medical wer[facility].  We will repeat her creatinine tomorrow.  If it is improved we will discharge to home, and plan for ureteral stent exchange in 3 months.  I discussed with the patient and her  note that she has progressive renal decline despite today's maneuvers we will need to consider a right nephrostomy.

## 2025-07-19 NOTE — TELEPHONE ENCOUNTER
Established patient with bilateral ureteral strictures indwelling ureteral stents .  status post bilateral ureteral stent exchange while on-call.  Patient had significant right hydronephrosis despite recent exchange and proper positioning of her right stent.  Today I placed tandem right ureteral stents and also exchanged her left ureteral stent    Assuming the patient makes a good recovery, we can plan for next bilateral stent exchange in 3 months.  Joellen please work on scheduling that.    CC EFREM [place] to update stent list

## 2025-07-20 VITALS
HEIGHT: 62 IN | HEART RATE: 81 BPM | OXYGEN SATURATION: 100 % | RESPIRATION RATE: 16 BRPM | WEIGHT: 115 LBS | DIASTOLIC BLOOD PRESSURE: 58 MMHG | BODY MASS INDEX: 21.16 KG/M2 | TEMPERATURE: 97.9 F | SYSTOLIC BLOOD PRESSURE: 123 MMHG

## 2025-07-20 LAB
ANION GAP SERPL CALCULATED.3IONS-SCNC: 4 MMOL/L (ref 4–13)
BACTERIA UR CULT: NORMAL
BUN SERPL-MCNC: 28 MG/DL (ref 5–25)
CALCIUM SERPL-MCNC: 9.5 MG/DL (ref 8.4–10.2)
CHLORIDE SERPL-SCNC: 113 MMOL/L (ref 96–108)
CO2 SERPL-SCNC: 26 MMOL/L (ref 21–32)
CREAT SERPL-MCNC: 1.29 MG/DL (ref 0.6–1.3)
ERYTHROCYTE [DISTWIDTH] IN BLOOD BY AUTOMATED COUNT: 12.8 % (ref 11.6–15.1)
GFR SERPL CREATININE-BSD FRML MDRD: 46 ML/MIN/1.73SQ M
GLUCOSE SERPL-MCNC: 139 MG/DL (ref 65–140)
HCT VFR BLD AUTO: 22.6 % (ref 34.8–46.1)
HGB BLD-MCNC: 7.3 G/DL (ref 11.5–15.4)
MCH RBC QN AUTO: 31.5 PG (ref 26.8–34.3)
MCHC RBC AUTO-ENTMCNC: 32.3 G/DL (ref 31.4–37.4)
MCV RBC AUTO: 97 FL (ref 82–98)
PLATELET # BLD AUTO: 159 THOUSANDS/UL (ref 149–390)
PMV BLD AUTO: 9.3 FL (ref 8.9–12.7)
POTASSIUM SERPL-SCNC: 3.7 MMOL/L (ref 3.5–5.3)
RBC # BLD AUTO: 2.32 MILLION/UL (ref 3.81–5.12)
SODIUM SERPL-SCNC: 143 MMOL/L (ref 135–147)
WBC # BLD AUTO: 4.09 THOUSAND/UL (ref 4.31–10.16)

## 2025-07-20 PROCEDURE — NC001 PR NO CHARGE: Performed by: NURSE PRACTITIONER

## 2025-07-20 PROCEDURE — 80048 BASIC METABOLIC PNL TOTAL CA: CPT | Performed by: UROLOGY

## 2025-07-20 PROCEDURE — 85027 COMPLETE CBC AUTOMATED: CPT | Performed by: UROLOGY

## 2025-07-20 PROCEDURE — 99232 SBSQ HOSP IP/OBS MODERATE 35: CPT | Performed by: UROLOGY

## 2025-07-20 RX ORDER — OXYBUTYNIN CHLORIDE 5 MG/1
5 TABLET ORAL 3 TIMES DAILY
Qty: 15 TABLET | Refills: 0 | Status: SHIPPED | OUTPATIENT
Start: 2025-07-20 | End: 2025-07-25

## 2025-07-20 RX ADMIN — DEXTROSE AND SODIUM CHLORIDE 100 ML/HR: 5; .9 INJECTION, SOLUTION INTRAVENOUS at 02:47

## 2025-07-20 RX ADMIN — URSODIOL 900 MG: 300 CAPSULE ORAL at 09:07

## 2025-07-20 RX ADMIN — FENOFIBRATE 145 MG: 145 TABLET ORAL at 09:06

## 2025-07-20 NOTE — PROGRESS NOTES
UROLOGY PROGRESS NOTE   Patient Identifiers: Danielle Haque (MRN [id number])  Date of Service: 7/20/2025    Subjective:     AFVSS overnight    Objective:     VITALS:    Vitals:    07/19/25 2202   BP: 127/55   Pulse: 84   Resp: 16   Temp: 99.3 °F (37.4 °C)   SpO2: 98%       INS & OUTS:  I/O last 24 hours:  In: 300 [I.V.:200; IV Piggyback:100]  Out: -     LABS:  Lab Results   Component Value Date    HGB 7.3 (L) 07/20/2025    HCT 22.6 (L) 07/20/2025    WBC 4.09 (L) 07/20/2025     07/20/2025   ]    Lab Results   Component Value Date    K 3.7 07/20/2025     (H) 07/20/2025    CO2 26 07/20/2025    BUN 28 (H) 07/20/2025    CREATININE 1.29 07/20/2025    CALCIUM 9.5 07/20/2025   ]    INPATIENT MEDS:  Current Medications[1]      Physical Exam:     GEN: alert and oriented x 3    RESP: breathing comfortably with no accessory muscle use    ABD: soft, non-tender, non-distended    EXT: no significant peripheral edema         Assessment:     59-year-old female with bilateral hydronephrosis    Known to Dr. Stephens with bilateral ureteral strictures, long term indwelling ureteral stents    Creatinine 1.42 on 7/18/2025    OR 7/19/2025: Bilateral ureteral stent exchange (tandem ureteral stent placement on the right)    Afebrile, vital signs stable overnight    Labs 7/20/2025: White count 4.09, hemoglobin 7.3, creatinine 1.29    Plan:   -Cr improved today, AFVSS overnight, no flank pain  -Ok for discharge         [1]   Current Facility-Administered Medications:     acetaminophen (TYLENOL) tablet 650 mg, 650 mg, Oral, Q4H PRN, José Luis Stephens MD    dextrose 5 % and sodium chloride 0.9 % infusion, 100 mL/hr, Intravenous, Continuous, José Luis Stephens MD, Last Rate: 100 mL/hr at 07/20/25 0247, 100 mL/hr at 07/20/25 0247    fenofibrate (TRICOR) tablet 145 mg, 145 mg, Oral, Daily, José Luis Stephens MD    gabapentin (NEURONTIN) capsule 300 mg, 300 mg, Oral, HS, José Luis Stephens MD, 300 mg at 07/19/25 2110     HYDROcodone-acetaminophen (NORCO) 5-325 mg per tablet 1 tablet, 1 tablet, Oral, Q6H PRN, José Luis Stephens MD    HYDROcodone-acetaminophen (NORCO) 5-325 mg per tablet 2 tablet, 2 tablet, Oral, Q6H PRN, José Luis Stephens MD, 2 tablet at 07/19/25 2116    HYDROmorphone (DILAUDID) injection 0.5 mg, 0.5 mg, Intravenous, Q3H PRN, José Luis Stephens MD, 0.5 mg at 07/19/25 1438    ondansetron (ZOFRAN) injection 4 mg, 4 mg, Intravenous, Q6H PRN, José Luis Stephens MD    tamsulosin (FLOMAX) capsule 0.4 mg, 0.4 mg, Oral, Daily With Dinner, José Luis Stephens MD, 0.4 mg at 07/19/25 1706    ursodiol (ACTIGALL) capsule 900 mg, 900 mg, Oral, Daily, José Luis Stephens MD

## 2025-07-20 NOTE — PLAN OF CARE
Problem: PAIN - ADULT  Goal: Verbalizes/displays adequate comfort level or baseline comfort level  Description: Interventions:  - Encourage patient to monitor pain and request assistance  - Assess pain using appropriate pain scale  - Administer analgesics as ordered based on type and severity of pain and evaluate response  - Implement non-pharmacological measures as appropriate and evaluate response  - Consider cultural and social influences on pain and pain management  - Notify physician/advanced practitioner if interventions unsuccessful or patient reports new pain  - Educate patient/family on pain management process including their role and importance of  reporting pain   - Provide non-pharmacologic/complimentary pain relief interventions  Outcome: Progressing     Problem: INFECTION - ADULT  Goal: Absence or prevention of progression during hospitalization  Description: INTERVENTIONS:  - Assess and monitor for signs and symptoms of infection  - Monitor lab/diagnostic results  - Monitor all insertion sites, i.e. indwelling lines, tubes, and drains  - Monitor endotracheal if appropriate and nasal secretions for changes in amount and color  - [place] appropriate cooling/warming therapies per order  - Administer medications as ordered  - Instruct and encourage patient and family to use good hand hygiene technique  - Identify and instruct in appropriate isolation precautions for identified infection/condition  Outcome: Progressing  Goal: Absence of fever/infection during neutropenic period  Description: INTERVENTIONS:  - Monitor WBC  - Perform strict hand hygiene  - Limit to healthy visitors only  - No plants, dried, fresh or silk flowers with bonilla in patient room  Outcome: Progressing     Problem: SAFETY ADULT  Goal: Patient will remain free of falls  Description: INTERVENTIONS:  - Educate patient/family on patient safety including physical limitations  - Instruct patient to call for assistance with activity   -  Consider consulting OT/PT to assist with strengthening/mobility based on AM PAC & JH-HLM score  - Consult OT/PT to assist with strengthening/mobility   - Keep Call bell within reach  - Keep bed low and locked with side rails adjusted as appropriate  - Keep care items and personal belongings within reach  - Initiate and maintain comfort rounds  - Make Fall Risk Sign visible to staff  - Offer Toileting every  Hours, in advance of need  - Initiate/Maintain alarm  - Obtain necessary fall risk management equipment:   - Apply yellow socks and bracelet for high fall risk patients  - Consider moving patient to room near nurses station  Outcome: Progressing  Goal: Maintain or return to baseline ADL function  Description: INTERVENTIONS:  -  Assess patient's ability to carry out ADLs; assess patient's baseline for ADL function and identify physical deficits which impact ability to perform ADLs (bathing, care of mouth/teeth, toileting, grooming, dressing, etc.)  - Assess/evaluate cause of self-care deficits   - Assess range of motion  - Assess patient's mobility; develop plan if impaired  - Assess patient's need for assistive devices and provide as appropriate  - Encourage maximum independence but intervene and supervise when necessary  - Involve family in performance of ADLs  - Assess for home care needs following discharge   - Consider OT consult to assist with ADL evaluation and planning for discharge  - Provide patient education as appropriate  - Monitor functional capacity and physical performance, use of AM PAC & JH-HLM   - Monitor gait, balance and fatigue with ambulation    Outcome: Progressing  Goal: Maintains/Returns to pre admission functional level  Description: INTERVENTIONS:  - Perform AM-PAC 6 Click Basic Mobility/ Daily Activity assessment daily.  - Set and communicate daily mobility goal to care team and patient/family/caregiver.   - Collaborate with rehabilitation services on mobility goals if consulted  -  Perform Range of Motion 3 times a day.  - Reposition patient every 3 hours.  - Dangle patient 3 times a day  - Stand patient 3 times a day  - Ambulate patient 3 times a day  - Out of bed to chair 3 times a day   - Out of bed for meals 3 times a day  - Out of bed for toileting  - Record patient progress and toleration of activity level   Outcome: Progressing     Problem: DISCHARGE PLANNING  Goal: Discharge to home or other facility with appropriate resources  Description: INTERVENTIONS:  - Identify barriers to discharge w/patient and caregiver  - Arrange for needed discharge resources and transportation as appropriate  - Identify discharge learning needs (meds, wound care, etc.)  - Arrange for interpretive services to assist at discharge as needed  - Refer to Case Management Department for coordinating discharge planning if the patient needs post-hospital services based on physician/advanced practitioner order or complex needs related to functional status, cognitive ability, or social support system  Outcome: Progressing     Problem: Knowledge Deficit  Goal: Patient/family/caregiver demonstrates understanding of disease process, treatment plan, medications, and discharge instructions  Description: Complete learning assessment and assess knowledge base.  Interventions:  - Provide teaching at level of understanding  - Provide teaching via preferred learning methods  Outcome: Progressing

## 2025-07-20 NOTE — DISCHARGE SUMMARY
Discharge Summary - Urology   Name: Danielle Haque 59 y.o. female I MRN: [id number]  Unit/Bed#: S -01 I Date of Admission: 7/18/2025   Date of Service: 7/20/2025 I Hospital Day: 0  { ?Quick Links I Problem List I PORCH I Billing Tip:78625}  Admission Date: 7/18/2025 2117  Discharge Date: 07/20/25  Admitting Diagnosis: Pulmonary nodules [R91.8]  Flank pain [R10.9]  Hydronephrosis due to obstruction of ureter [N13.1]  Discharge Diagnosis:   Medical Problems       Resolved Problems  Date Reviewed: 6/4/2025   None         HPI: ***    Procedures Performed: No orders of the defined types were placed in this encounter.      Summary of Hospital Course: {Hospital Course:11300} ***    Significant Findings, Care, Treatment and Services Provided: ***    Complications: ***    Condition at Discharge: {condition:46466}       Discharge instructions/Information to patient and family:   See After Visit Summary (AVS) for information provided to patient and family.      Provisions for Follow-Up Care:  See after visit summary for information related to follow-up care and any pertinent home health orders.      PCP: Leena Anaya MD    Disposition: {Discharge Disposition:22158}    Planned Readmission: {EXAM; YES/NO:16576}     Discharge Medications:  See after visit summary for reconciled discharge medications provided to patient and family.      Discharge Statement:  I have spent a total time of *** minutes in caring for this patient on the day of the visit/encounter. {>30 minutes of time was spent on:83975}.   and family.      Provisions for Follow-Up Care:  See after visit summary for information related to follow-up care and any pertinent home health orders.      PCP: Leena Anaya MD    Disposition: Home    Planned Readmission: No     Discharge Medications:  See after visit summary for reconciled discharge medications provided to patient and family.      Discharge Statement:  I have spent a total time of 20 minutes in caring for this patient on the day of the visit/encounter.

## 2025-07-20 NOTE — UTILIZATION REVIEW
"Initial Clinical Review    Admission: Date/Time/Statement:   Admission Orders (From admission, onward)       Ordered        07/19/25 0741  Place in Observation  Once                          Orders Placed This Encounter   Procedures    Place in Observation     Standing Status:   Standing     Number of Occurrences:   1     Level of Care:   Med Surg [16]     ED Arrival Information       Expected   -    Arrival   7/18/2025 20:29    Acuity   Urgent              Means of arrival   Walk-In    Escorted by   Family Member    Service   Urology    Admission type   Emergency              Arrival complaint   Flank pain             Chief Complaint   Patient presents with    Back Pain     Pt comes to ED c/o R sided back pain starting today. Pt states she has hx of kidney issues and she states \"I haven't had this pain for months.\"       Initial Presentation: 59 y.o. female  to ED via walk in from home.    Admitted to observation with Dx: recalcitrant bilateral ureteral stricture disease/Indwelling bilateral ureteral stents/Right hydronephrosis/Right renal colic/Acute kidney injury.  Presented to ED with Right flank pain starting about 8. 5 hours prioo to arrival.  Has dysuria,  . PMHx:colorectal cancer status post prior radiation treatments now with ureteral strictures managed with bilateral ureteral stents. Last underwent cystoscopy ureteroscopy retrograde pyelogram and ureteral stent insertion right ureteral stent exchange 6/13/2025 with plan for ureteral stent exchanges every 4 months. . On exam: acute distress.  Right CVA tenderness.  Colostomy bag in place.   UA innumerable WBC, large leuko and occult blood.   Wbc 8.46.  H&h 9.9/29.2.  bun 31. Creatinine 1.42. and was 1.03 on 7/8/25  calcium 11.  Imaging shows severe right and mild left hydronephrosis with ureteral stents in place.  . ED treatment:  Morphine, Toradol.  Started on IVF.    Plan includes surgical intervention.  Monitor renal function.  .     7/19/25 Procedure " Cystoscopy  2) bilateral retrograde pyelography with fluoroscopic interpretation  3) bilateral ureteral stent exchange (with placement of two tandem 6 Chinese ureteral stents on the right)  Findings Both stents appear to be in proper position radiographically and cystoscopically. However there is significant right pelvicaliectasis with retained contrast from the patient's overnight CAT scan within the right kidney. I exchanged both ureteral stents. There was a hydronephrotic drip from the right side and a urine culture was collected. Due to the development of obstruction within 1 month of recent stent exchange without distal migration of the stent, I elected to place tandem stents on the right.       Date:    Day 2:     ED Treatment-Medication Administration from 07/18/2025 2029 to 07/19/2025 0246         Date/Time Order Dose Route Action     07/18/2025 2259 morphine injection 4 mg 4 mg Intravenous Given     07/18/2025 2257 ketorolac (TORADOL) injection 15 mg 15 mg Intravenous Given     07/19/2025 0206 dextrose 5 % and sodium chloride 0.9 % infusion 100 mL/hr Intravenous New Bag     07/19/2025 0207 gabapentin (NEURONTIN) capsule 300 mg 300 mg Oral Given            Scheduled Medications:  fenofibrate, 145 mg, Oral, Daily  gabapentin, 300 mg, Oral, HS  tamsulosin, 0.4 mg, Oral, Daily With Dinner  ursodiol, 900 mg, Oral, Daily      Continuous IV Infusions:  dextrose 5 % and sodium chloride 0.9 %, 100 mL/hr, Intravenous, Continuous      PRN Meds:  acetaminophen, 650 mg, Oral, Q4H PRN  HYDROcodone-acetaminophen, 1 tablet, Oral, Q6H PRN  HYDROcodone-acetaminophen, 2 tablet, Oral, Q6H PRN  HYDROmorphone, 0.5 mg, Intravenous, Q3H PRN  ondansetron, 4 mg, Intravenous, Q6H PRN      ED Triage Vitals [07/18/25 2055]   Temperature Pulse Respirations Blood Pressure SpO2 Pain Score   98.8 °F (37.1 °C) 77 18 150/79 100 % 7     Weight (last 2 days)       Date/Time Weight    07/18/25 2055 52.2 (115)            Vital Signs (last 3 days)        Date/Time Temp Pulse Resp BP MAP (mmHg) SpO2 O2 Device Cardiac (WDL) Patient Position - Orthostatic VS [place] Coma Scale Score Pain    07/20/25 0100 -- -- -- -- -- -- -- -- -- -- 4    07/19/25 22:02:53 99.3 °F (37.4 °C) 84 16 127/55 79 98 % -- -- -- -- --    07/19/25 2116 -- -- -- -- -- -- -- -- -- -- 5 07/19/25 2000 -- -- -- -- -- -- -- -- -- 15 --    07/19/25 15:29:26 99.2 °F (37.3 °C) 71 16 96/52 67 97 % -- -- -- -- --    07/19/25 1438 -- -- -- -- -- -- -- -- -- -- 7 07/19/25 11:10:38 -- 67 -- 130/52 78 98 % -- -- -- -- --    07/19/25 1102 -- -- -- -- -- -- -- -- -- -- 8    07/19/25 1022 -- -- -- 104/58 -- -- -- WDL -- 15 --    07/19/25 1020 98.5 °F (36.9 °C) 71 18 95/50 -- 95 % None (Room air) -- -- -- 5 07/19/25 1013 -- -- -- -- -- -- -- -- -- -- 7 07/19/25 1006 98.5 °F (36.9 °C) 75 18 138/67 -- 99 % None (Room air) -- -- -- 7 07/19/25 0959 -- -- 18 -- -- -- -- -- -- 15 6    07/19/25 0950 98.5 °F (36.9 °C) 78 10 140/61 -- 99 % None (Room air) WDL -- -- --    07/19/25 0842 -- -- -- 120/64 -- -- -- -- -- -- --    07/19/25 0841 99 °F (37.2 °C) 78 18 -- -- 100 % None (Room air) -- -- -- --    07/19/25 07:15:41 99.3 °F (37.4 °C) 69 16 97/48 64 98 % -- -- -- -- --    07/19/25 0300 -- -- -- -- -- -- -- -- -- -- No Pain    07/19/25 02:53:26 99.5 °F (37.5 °C) 77 -- 105/68 80 98 % -- -- -- -- --    07/19/25 0253 -- -- -- -- -- -- -- -- -- -- No Pain    07/19/25 0213 -- 78 18 97/50 71 98 % None (Room air) -- Lying -- --    07/18/25 2259 -- -- -- -- -- -- -- -- -- -- 8    07/18/25 2257 -- -- -- -- -- -- -- -- -- -- 7    07/18/25 2055 98.8 °F (37.1 °C) 77 18 150/79 -- 100 % None (Room air) -- -- -- 7              Pertinent Labs/Diagnostic Test Results:   Radiology:  CT chest abdomen pelvis w contrast   ED Interpretation by Bo Hurley MD (07/19 0116)   CHEST     LUNGS: Innumerable bilateral pulmonary nodules. Medial right lower lobe subpleural pulmonary nodule appears slightly more solid and  slightly enlarged compared to the prior study, now measuring up to 0.9 cm (302, 176), previously measuring up to 0.7 cm   when measured in a similar fashion. There is also mild enlargement of a left lower lobe pulmonary nodule now measuring up to 1 cm (302, 159), also appearing more solid, previously measuring up to 0.8 cm when measured in a similar fashion.     PLEURA: Unremarkable.     HEART/GREAT VESSELS: Heart is unremarkable for patient's age. No thoracic aortic aneurysm.     MEDIASTINUM AND DUSTY: Unremarkable.     CHEST WALL AND LOWER NECK: Stable right thyroid nodule. Right chest Mediport     ABDOMEN     LIVER/BILIARY TREE: Unremarkable.     GALLBLADDER: No calcified gallstones. No pericholecystic inflammatory change.     SPLEEN: Unremarkable.     PANCREAS: Unremarkable.     ADRENAL GLANDS: Unremarkable.     KIDNEYS/URETERS: Severe right    hydronephrosis with ureteral stent in place. Mild left hydronephrosis with ureteral stent in place. Fat stranding about the bilateral renal pelvis.     Multiple layering nonobstructing calculi in the right renal collecting system approximately 4-5 in number, measuring up to 0.6 cm. Punctate nonobstructing left lower pole calculus.     STOMACH AND BOWEL: Rectosigmoid anastomosis. No evidence of bowel obstruction. Right upper quadrant loop ileostomy     APPENDIX: No findings to suggest appendicitis.     ABDOMINOPELVIC CAVITY: No ascites. No pneumoperitoneum. No lymphadenopathy.     VESSELS: Atherosclerosis without abdominal aortic aneurysm.     PELVIS     REPRODUCTIVE ORGANS: Unremarkable for patient's age.     URINARY BLADDER: Unremarkable.     ABDOMINAL WALL/INGUINAL REGIONS: Unremarkable.     BONES: No acute fracture or suspicious osseous lesion. Spinal degenerative changes.     IMPRESSION:     Severe right and mild left hydronephrosis with ureteral stents in place. Correlate for negro   nt dysfunction fat stranding about the bilateral renal pelvis likely related to  "inflammation.     Innumerable pulmonary nodules, some of which appear mildly enlarged compared to CT of 4/16/2024. Attention on follow-up.        Final Interpretation by Hiro Lama DO (07/19 0109)      Severe right and mild left hydronephrosis with ureteral stents in place. Correlate for stent dysfunction fat stranding about the bilateral renal pelvis likely related to inflammation.      Innumerable pulmonary nodules, some of which appear mildly enlarged compared to CT of 4/16/2024. Attention on follow-up.            Computerized Assisted Algorithm (CAA) may have aided analysis of applicable images.      The study was marked in EPIC for immediate notification.      Workstation performed: [id number]         FL retrograde pyelogram    (Results Pending)     Cardiology:  No orders to display     GI:  No orders to display           Results from last 7 days   Lab Units 07/20/25 0440 07/18/25  2256   WBC Thousand/uL 4.09* 8.46   HEMOGLOBIN g/dL 7.3* 9.9*   HEMATOCRIT % 22.6* 29.2*   PLATELETS Thousands/uL 159 225         Results from last 7 days   Lab Units 07/20/25  0440 07/18/25  2256   SODIUM mmol/L 143 139   POTASSIUM mmol/L 3.7 4.1   CHLORIDE mmol/L 113* 104   CO2 mmol/L 26 26   ANION GAP mmol/L 4 9   BUN mg/dL 28* 31*   CREATININE mg/dL 1.29 1.42*   EGFR ml/min/1.73sq m 46 41   CALCIUM mg/dL 9.5 11.0*     Results from last 7 days   Lab Units 07/18/25  2256   AST U/L 25   ALT U/L 24   ALK PHOS U/L 95   TOTAL PROTEIN g/dL 7.2   ALBUMIN g/dL 4.3   TOTAL BILIRUBIN mg/dL 0.49         Results from last 7 days   Lab Units 07/20/25  0440 07/18/25  2256   GLUCOSE RANDOM mg/dL 139 128             No results found for: \"BETA-HYDROXYBUTYRATE\"                                                                                       Results from last 7 days   Lab Units 07/18/25  2256   CLARITY UA  Turbid   COLOR UA  Light Yellow   SPEC GRAV UA  1.013   PH UA  6.0   GLUCOSE UA mg/dl Negative   KETONES UA mg/dl Negative   BLOOD UA  " Large*   PROTEIN UA mg/dl 50 (1+)*   NITRITE UA  Negative   BILIRUBIN UA  Negative   UROBILINOGEN UA (BE) mg/dl <2.0   LEUKOCYTES UA  Large*   WBC UA /hpf Innumerable*   RBC UA /hpf Innumerable*   BACTERIA UA /hpf Occasional   EPITHELIAL CELLS WET PREP /hpf Occasional                                                   Past Medical History[1]  Present on Admission:  **None**      Admitting Diagnosis: Pulmonary nodules [R91.8]  Flank pain [R10.9]  Hydronephrosis due to obstruction of ureter [N13.1]  Age/Sex: 59 y.o. female    Network Utilization Review Department  ATTENTION: Please call with any questions or concerns to [phone] and carefully listen to the prompts so that you are directed to the right person. All voicemails are confidential.   For Discharge needs, contact Care Management DC Support Team at [phone] opt. [phone]  Send all requests for admission clinical reviews, approved or denied determinations and any other requests to dedicated fax number below belonging to the campus where the patient is receiving treatment. List of dedicated fax numbers for the Facilities:  FACILITY NAME UR FAX NUMBER   ADMISSION DENIALS (Administrative/Medical Necessity) [phone]   DISCHARGE SUPPORT TEAM (NETWORK) [phone]   PARENT CHILD HEALTH (Maternity/NICU/Pediatrics) [phone]   [facility] [phone]   [facility] [phone]   [facility] [phone]   [facility] [phone]   [facility] [phone]   [facility] [phone]   [facility] [phone]   [facility] [phone]   [facility] [phone]   [facility] [phone]   [facility] [phone]   [facility]  [phone]              [1]   Past Medical History:  Diagnosis Date    Anemia     Bleeding from colostomy stoma (HCC) 02/11/2023     states ileostomy , not colostomy    Cancer (HCC)     Chronic kidney disease     Colon cancer (HCC)     Cyst (solitary) of breast, left     Dermoid cyst of neck     RIGHT    Dermoid cyst of skin of back     LEFT    Elevated serum creatinine 09/11/2024    Fall     Headache     Hemochromatosis, unspecified     History of transfusion     within 2 months 4/2/2025    Hyperlipidemia     Hypokalemia 09/12/2024    Kidney calculi     Kidney stone     Liver disease     hemangioma    Muscle weakness     Personal history of COVID-19 12/2022    Sarcoidosis     Seizures (HCC)     2022-- one seizure at that time - no further seizures at this time    Shingles     Ureteral stent present

## 2025-07-21 ENCOUNTER — TRANSITIONAL CARE MANAGEMENT (OUTPATIENT)
Dept: INTERNAL MEDICINE CLINIC | Facility: CLINIC | Age: 60
End: 2025-07-21

## 2025-07-22 ENCOUNTER — RESULTS FOLLOW-UP (OUTPATIENT)
Dept: UROLOGY | Facility: CLINIC | Age: 60
End: 2025-07-22

## 2025-07-22 DIAGNOSIS — N20.1 URETERAL CALCULI: Primary | ICD-10-CM

## 2025-07-22 LAB — BACTERIA UR CULT: ABNORMAL

## 2025-07-22 RX ORDER — FLUCONAZOLE 200 MG/1
200 TABLET ORAL DAILY
Qty: 5 TABLET | Refills: 0 | Status: SHIPPED | OUTPATIENT
Start: 2025-07-22 | End: 2025-07-27

## 2025-07-22 NOTE — TELEPHONE ENCOUNTER
Patient has a yeast UTI. I prescribed antifungal to their pharmacy. Can you please call them and instruct them to start. Thank you

## 2025-07-22 NOTE — TELEPHONE ENCOUNTER
Attempted to call and speak with patient but patient did not currently have the phone.  Office will try again at at later time.        ----- Message from José Luis Stephens MD sent at 7/22/2025 10:31 AM EDT -----      ----- Message -----  From: Lab, Background User  Sent: 7/20/2025   4:21 PM EDT  To: José Luis Stephens MD

## 2025-07-25 NOTE — TELEPHONE ENCOUNTER
----- Message from José Luis Stephens MD sent at 7/22/2025 10:31 AM EDT -----      ----- Message -----  From: Lab, Background User  Sent: 7/20/2025   4:21 PM EDT  To: José Luis Stephens MD

## 2025-07-29 ENCOUNTER — RA CDI HCC (OUTPATIENT)
Dept: OTHER | Facility: HOSPITAL | Age: 60
End: 2025-07-29

## 2025-07-29 PROBLEM — R90.89 ABNORMAL BRAIN MRI: Status: ACTIVE | Noted: 2025-07-29

## 2025-07-29 PROBLEM — M79.2 NEURALGIA INVOLVING SCALP: Status: RESOLVED | Noted: 2023-09-11 | Resolved: 2025-07-29

## 2025-07-30 ENCOUNTER — OFFICE VISIT (OUTPATIENT)
Dept: INTERNAL MEDICINE CLINIC | Facility: CLINIC | Age: 60
End: 2025-07-30
Payer: COMMERCIAL

## 2025-07-30 ENCOUNTER — TELEPHONE (OUTPATIENT)
Dept: HEMATOLOGY ONCOLOGY | Facility: CLINIC | Age: 60
End: 2025-07-30

## 2025-07-30 VITALS
WEIGHT: 119.8 LBS | HEIGHT: 62 IN | BODY MASS INDEX: 22.05 KG/M2 | HEART RATE: 88 BPM | DIASTOLIC BLOOD PRESSURE: 60 MMHG | OXYGEN SATURATION: 99 % | SYSTOLIC BLOOD PRESSURE: 100 MMHG

## 2025-07-30 DIAGNOSIS — R51.9 FACIAL PAIN: ICD-10-CM

## 2025-07-30 DIAGNOSIS — D64.9 LOW HEMOGLOBIN: ICD-10-CM

## 2025-07-30 DIAGNOSIS — D86.9 SARCOIDOSIS: ICD-10-CM

## 2025-07-30 DIAGNOSIS — Z23 ENCOUNTER FOR IMMUNIZATION: Primary | ICD-10-CM

## 2025-07-30 DIAGNOSIS — D50.9 IRON DEFICIENCY ANEMIA, UNSPECIFIED IRON DEFICIENCY ANEMIA TYPE: ICD-10-CM

## 2025-07-30 DIAGNOSIS — R90.89 ABNORMAL BRAIN MRI: ICD-10-CM

## 2025-07-30 DIAGNOSIS — N13.30 BILATERAL HYDRONEPHROSIS: Chronic | ICD-10-CM

## 2025-07-30 DIAGNOSIS — R21 RASH: ICD-10-CM

## 2025-07-30 PROCEDURE — 99495 TRANSJ CARE MGMT MOD F2F 14D: CPT | Performed by: GENERAL ACUTE CARE HOSPITAL

## 2025-07-30 RX ORDER — KETOCONAZOLE 20 MG/G
CREAM TOPICAL DAILY
Qty: 30 G | Refills: 0 | Status: SHIPPED | OUTPATIENT
Start: 2025-07-30

## 2025-08-01 ENCOUNTER — PREP FOR PROCEDURE (OUTPATIENT)
Dept: UROLOGY | Facility: AMBULATORY SURGERY CENTER | Age: 60
End: 2025-08-01

## 2025-08-01 DIAGNOSIS — N13.1 HYDRONEPHROSIS DUE TO OBSTRUCTION OF URETER: Primary | ICD-10-CM

## 2025-08-01 DIAGNOSIS — R39.89 SUSPECTED UTI: ICD-10-CM

## 2025-08-01 NOTE — TELEPHONE ENCOUNTER
Spoke with patient and confirmed surgery date of: 10/10/2025  Type of surgery: Cysto/ Jonathan. RGP/ Jonathan. Stent exchange  Operating physician: Dr. Stephens  Location of surgery: [facility]    Verbally went over prep with patient on: 8/1/2025  NPO  Bowel prep? NO  Hospital calls afternoon prior with arrival time -Calls Friday afternoon for Monday surgeries  Patient needs ride to and from surgery outpatient  Pre-op testing to be done 2 weeks prior to surgery  Urine Culture   Blood thinners:   NONE  Clearances needed: NONE    Mailed/emailed to patient on:  Copy of packet scanned into Media on:  Labs in packet  Soap / Bowel prep in packet  Pre-op & Post-op in packet  Dates of H&P and post-op if needed    Consent: on admit

## 2025-08-06 ENCOUNTER — TELEPHONE (OUTPATIENT)
Dept: NEPHROLOGY | Facility: CLINIC | Age: 60
End: 2025-08-06

## 2025-08-07 ENCOUNTER — HOSPITAL ENCOUNTER (OUTPATIENT)
Dept: CT IMAGING | Facility: HOSPITAL | Age: 60
Discharge: HOME | End: 2025-08-07
Attending: INTERNAL MEDICINE

## 2025-08-07 ENCOUNTER — CONSULT (OUTPATIENT)
Dept: OTOLARYNGOLOGY | Facility: CLINIC | Age: 60
End: 2025-08-07

## 2025-08-07 VITALS — BODY MASS INDEX: 21.9 KG/M2 | WEIGHT: 119 LBS | HEIGHT: 62 IN | TEMPERATURE: 97 F

## 2025-08-07 DIAGNOSIS — H61.21 IMPACTED CERUMEN OF RIGHT EAR: ICD-10-CM

## 2025-08-07 DIAGNOSIS — H93.8X1 SENSATION OF PLUGGED EAR ON RIGHT SIDE: ICD-10-CM

## 2025-08-07 DIAGNOSIS — R09.81 NASAL CONGESTION: Primary | ICD-10-CM

## 2025-08-08 ENCOUNTER — APPOINTMENT (OUTPATIENT)
Dept: LAB | Facility: AMBULARY SURGERY CENTER | Age: 60
End: 2025-08-08
Payer: COMMERCIAL

## 2025-08-08 LAB
ANION GAP SERPL CALCULATED.3IONS-SCNC: 8 MMOL/L (ref 4–13)
BUN SERPL-MCNC: 26 MG/DL (ref 5–25)
CALCIUM SERPL-MCNC: 10 MG/DL (ref 8.4–10.2)
CHLORIDE SERPL-SCNC: 106 MMOL/L (ref 96–108)
CO2 SERPL-SCNC: 25 MMOL/L (ref 21–32)
CREAT SERPL-MCNC: 1.2 MG/DL (ref 0.6–1.3)
CREAT UR-MCNC: 99.6 MG/DL
GFR SERPL CREATININE-BSD FRML MDRD: 50 ML/MIN/1.73SQ M
GLUCOSE SERPL-MCNC: 83 MG/DL (ref 65–140)
MICROALBUMIN UR-MCNC: 1266.6 MG/L
MICROALBUMIN/CREAT 24H UR: 1272 MG/G CREATININE (ref 0–30)
PHOSPHATE SERPL-MCNC: 4 MG/DL (ref 2.7–4.5)
POTASSIUM SERPL-SCNC: 3.8 MMOL/L (ref 3.5–5.3)
PTH-INTACT SERPL-MCNC: 16.5 PG/ML (ref 12–88)
SODIUM SERPL-SCNC: 139 MMOL/L (ref 135–147)

## 2025-08-14 ENCOUNTER — OFFICE VISIT (OUTPATIENT)
Dept: HEMATOLOGY ONCOLOGY | Facility: CLINIC | Age: 60
End: 2025-08-14
Payer: COMMERCIAL

## 2025-08-14 ENCOUNTER — PREP FOR PROCEDURE (OUTPATIENT)
Dept: INTERVENTIONAL RADIOLOGY/VASCULAR | Facility: CLINIC | Age: 60
End: 2025-08-14

## 2025-08-14 ENCOUNTER — OFFICE VISIT (OUTPATIENT)
Dept: NEPHROLOGY | Facility: CLINIC | Age: 60
End: 2025-08-14
Payer: COMMERCIAL

## 2025-08-15 ENCOUNTER — TELEPHONE (OUTPATIENT)
Age: 60
End: 2025-08-15

## 2025-08-22 ENCOUNTER — OFFICE VISIT (OUTPATIENT)
Dept: PULMONOLOGY | Facility: CLINIC | Age: 60
End: 2025-08-22
Payer: COMMERCIAL

## 2025-08-22 VITALS
WEIGHT: 121 LBS | SYSTOLIC BLOOD PRESSURE: 102 MMHG | TEMPERATURE: 97.6 F | HEART RATE: 90 BPM | DIASTOLIC BLOOD PRESSURE: 64 MMHG | BODY MASS INDEX: 22.13 KG/M2 | OXYGEN SATURATION: 100 %

## 2025-08-22 DIAGNOSIS — D86.9 SARCOIDOSIS: Primary | ICD-10-CM

## 2025-08-22 DIAGNOSIS — N20.1 URETERAL CALCULI: ICD-10-CM

## 2025-08-22 DIAGNOSIS — C18.7 MALIGNANT NEOPLASM OF SIGMOID COLON (HCC): ICD-10-CM

## 2025-08-22 DIAGNOSIS — R91.8 PULMONARY NODULES: ICD-10-CM

## 2025-08-22 DIAGNOSIS — E83.52 HYPERCALCEMIA: ICD-10-CM

## 2025-08-22 PROCEDURE — 99214 OFFICE O/P EST MOD 30 MIN: CPT | Performed by: INTERNAL MEDICINE

## 2025-08-22 PROCEDURE — 94010 BREATHING CAPACITY TEST: CPT | Performed by: INTERNAL MEDICINE

## (undated) DEVICE — PLUMEPEN PRO 10FT

## (undated) DEVICE — ENSEAL X1 TISSUE SEALER, CURVED JAW, 37 CM SHAFT LENGTH: Brand: ENSEAL

## (undated) DEVICE — PACK TUR

## (undated) DEVICE — BASKET STONE RTRVL ZERO TIP 2.4FR

## (undated) DEVICE — FIBER STD QUANTA 200 MICRON

## (undated) DEVICE — Device

## (undated) DEVICE — GW URO .038IN 150CM URET NTNL STRL BNTSN TIP REG

## (undated) DEVICE — CIRCULAR MECH XL SEAL 29MM

## (undated) DEVICE — STAPLER 60 RELOAD GREEN: Brand: SUREFORM

## (undated) DEVICE — GAUZE SPONGES,16 PLY: Brand: CURITY

## (undated) DEVICE — INTENDED FOR TISSUE SEPARATION, AND OTHER PROCEDURES THAT REQUIRE A SHARP SURGICAL BLADE TO PUNCTURE OR CUT.: Brand: BARD-PARKER SAFETY BLADES SIZE 15, STERILE

## (undated) DEVICE — CHLORAPREP HI-LITE 10.5ML ORANGE

## (undated) DEVICE — COVER PROBE INTRAOPERATIVE 6 X 96 IN

## (undated) DEVICE — CHLORHEXIDINE 4PCT 4 OZ

## (undated) DEVICE — DRAPE SHEET THREE QUARTER

## (undated) DEVICE — ELECTRO LUBE IS A SINGLE PATIENT USE DEVICE THAT IS INTENDED TO BE USED ON ELECTROSURGICAL ELECTRODES TO REDUCE STICKING.: Brand: KEY SURGICAL ELECTRO LUBE

## (undated) DEVICE — TUBING SUCTION 5MM X 12 FT

## (undated) DEVICE — DECANTER: Brand: UNBRANDED

## (undated) DEVICE — GLOVE SRG BIOGEL 6.5

## (undated) DEVICE — STERILE LUBRICATING JELLY, TUBE: Brand: HR LUBRICATING JELLY

## (undated) DEVICE — PREMIUM DRY TRAY LF: Brand: MEDLINE INDUSTRIES, INC.

## (undated) DEVICE — UROLOGIC DRAIN BAG: Brand: UNBRANDED

## (undated) DEVICE — INVIEW CLEAR LEGGINGS: Brand: CONVERTORS

## (undated) DEVICE — SUT MONOCRYL 4-0 PS-2 18 IN Y496G

## (undated) DEVICE — STERILE CYSTO PACK: Brand: CARDINAL HEALTH

## (undated) DEVICE — GLOVE SRG BIOGEL ECLIPSE 7.5

## (undated) DEVICE — DRAPE TOWEL: Brand: CONVERTORS

## (undated) DEVICE — GLOVE SRG BIOGEL 7

## (undated) DEVICE — CATH URET .038 10FR 50CM DUAL LUMEN

## (undated) DEVICE — INFLATION DEVICE 6FR X 4CM

## (undated) DEVICE — STERILE ICS MINOR PACK: Brand: CARDINAL HEALTH

## (undated) DEVICE — ACCESS PLATFORM FOR MINIMALLY INVASIVE SURGERY.: Brand: GELPORT® LAPAROSCOPIC  SYSTEM

## (undated) DEVICE — GUIDEWIRE STRGHT TIP 0.035 IN  SOLO PLUS

## (undated) DEVICE — SYRINGE 30ML LL

## (undated) DEVICE — MEDI-VAC YANK SUCT HNDL W/TPRD BULBOUS TIP: Brand: CARDINAL HEALTH

## (undated) DEVICE — STERILE SURGICAL LUBRICANT,  TUBE: Brand: SURGILUBE

## (undated) DEVICE — FIBER STD QUANTA 365 MICRON

## (undated) DEVICE — KIT, BETHLEHEM ROBOTIC PROST: Brand: CARDINAL HEALTH

## (undated) DEVICE — CATH URETERAL 5FR X 70 CM FLEX TIP POLYUR BARD

## (undated) DEVICE — COLUMN DRAPE

## (undated) DEVICE — 3M™ IOBAN™ 2 ANTIMICROBIAL INCISE DRAPE 6650EZ: Brand: IOBAN™ 2

## (undated) DEVICE — BSKT SKYLITE STONE RETRV 3FR 120CM 4WIRE TIPLESS

## (undated) DEVICE — VESSEL SEALER EXTEND: Brand: ENDOWRIST

## (undated) DEVICE — UROCATCH BAG

## (undated) DEVICE — SPONGE 4 X 4 XRAY 16 PLY STRL LF RFD

## (undated) DEVICE — BASKET SPECIMEN RETRIVAL 1.9FR 120CM

## (undated) DEVICE — SUT VICRYL PLUS 0 UR-6 27IN VCP603H

## (undated) DEVICE — CANNULA SEAL

## (undated) DEVICE — SYRINGE 5ML LL

## (undated) DEVICE — VISUALIZATION SYSTEM: Brand: CLEARIFY

## (undated) DEVICE — STERILE EMESIS BASIN                 070: Brand: CARDINAL HEALTH

## (undated) DEVICE — FIRST STEP BEDSIDE KIT - STAND-UP POUCH, ENDOSCOPIC CLEANING PAD - 1 POUCH: Brand: FIRST STEP BEDSIDE KIT - STAND-UP POUCH, ENDOSCOPIC CLEANING PAD

## (undated) DEVICE — GLOVE SRG BIOGEL 8

## (undated) DEVICE — Device: Brand: DEFENDO AIR/WATER/SUCTION AND BIOPSY VALVE

## (undated) DEVICE — GLOVE SRG LF STRL BGL SKNSNS 6.5 PF

## (undated) DEVICE — CO2 AND WATER TUBING/CAP SET FOR OLYMPUS® SCOPES & UCR: Brand: ERBE

## (undated) DEVICE — TIP-UP FENESTRATED GRASPER: Brand: ENDOWRIST

## (undated) DEVICE — GLOVE INDICATOR PI UNDERGLOVE SZ 7 BLUE

## (undated) DEVICE — 40595 XL TRENDELENBURG POSITIONING KIT: Brand: 40595 XL TRENDELENBURG POSITIONING KIT

## (undated) DEVICE — SINGLE-USE DIGITAL FLEXIBLE URETEROSCOPE: Brand: APTRA

## (undated) DEVICE — SHEATH URETERAL ACCESS 12/14FR 35CM PROXIS

## (undated) DEVICE — GUIDEWIRE ANGLED TIP 0.035 IN SOLO PLUS

## (undated) DEVICE — ADAPTER URINARY CATH SIMPLIVIA ONGUARD 2

## (undated) DEVICE — ADHESIVE SKIN HIGH VISCOSITY EXOFIN 1ML

## (undated) DEVICE — TRAY FOLEY 16FR URIMETER SURESTEP

## (undated) DEVICE — CHLORAPREP HI-LITE 26ML ORANGE

## (undated) DEVICE — SHEATH URETERAL ACCESS 10/12FR 35CM PROXIS

## (undated) DEVICE — POOLE SUCTION HANDLE: Brand: CARDINAL HEALTH

## (undated) DEVICE — SUT PDS PLUS 1 CTX 36IN PDP371T

## (undated) DEVICE — USED IN CONJUNCTION WITH A SYRINGE AS AN ADDITIVE DEVICE FOR ASPIRATION FROM MULTI-DOSE MEDICINE VIALS OR INJECTION INTO I.V. SYSTEMS AND PRE-SLIT SEPTUMS COVERING INJECTION SITES.: Brand: SOL-M™ BLUNT FILL NEEDLE

## (undated) DEVICE — ULTRASOUND GEL STERILE FOIL PK

## (undated) DEVICE — INTENDED FOR TISSUE SEPARATION, AND OTHER PROCEDURES THAT REQUIRE A SHARP SURGICAL BLADE TO PUNCTURE OR CUT.: Brand: BARD-PARKER ® SAFETYLOCK CARBON RIB-BACK BLADES

## (undated) DEVICE — TIP COVER ACCESSORY

## (undated) DEVICE — DISPOSABLE OR TOWEL: Brand: CARDINAL HEALTH

## (undated) DEVICE — JACKSON-PRATT 100CC BULB RESERVOIR: Brand: CARDINAL HEALTH

## (undated) DEVICE — SUT PROLENE 2-0 SH 36 IN 8523H

## (undated) DEVICE — SUT VICRYL 4-0 P-3 18 IN J494G

## (undated) DEVICE — NEPTUNE E-SEP SMOKE EVACUATION PENCIL, COATED, 70MM BLADE, PUSH BUTTON SWITCH: Brand: NEPTUNE E-SEP

## (undated) DEVICE — JP CHANNEL DRAIN 19FR, FULL FLUTES: Brand: JACKSON-PRATT

## (undated) DEVICE — STAPLER 60: Brand: SUREFORM

## (undated) DEVICE — ARM DRAPE

## (undated) DEVICE — MINOR PROCEDURE DRAPE: Brand: CONVERTORS

## (undated) DEVICE — EXOFIN PRECISION PEN HIGH VISCOSITY TOPICAL SKIN ADHESIVE: Brand: EXOFIN PRECISION PEN, 1G

## (undated) DEVICE — MONOPOLAR CURVED SCISSORS: Brand: ENDOWRIST

## (undated) DEVICE — SPECIMEN CONTAINER STERILE PEEL PACK

## (undated) DEVICE — PROXIMATE SKIN STAPLERS (35 WIDE) CONTAINS 35 STAINLESS STEEL STAPLES (FIXED HEAD): Brand: PROXIMATE

## (undated) DEVICE — BETHLEHEM UNIVERSAL MINOR GEN: Brand: CARDINAL HEALTH

## (undated) DEVICE — FIBER LASER HOLMIUM 272MICRON HOL1020F

## (undated) DEVICE — GUIDEWIRE AMPLATZ SS .038 180CM

## (undated) DEVICE — SUT VICRYL 0 REEL 54 IN J287G

## (undated) DEVICE — REDUCER: Brand: ENDOWRIST

## (undated) DEVICE — SUT VICRYL 3-0 SH 27 IN J416H

## (undated) DEVICE — SEAL

## (undated) DEVICE — SCD SEQUENTIAL COMPRESSION COMFORT SLEEVE MEDIUM KNEE LENGTH: Brand: KENDALL SCD

## (undated) DEVICE — GLOVE SRG BIOGEL ORTHOPEDIC 7.5

## (undated) DEVICE — TOWEL SET X-RAY

## (undated) DEVICE — ADAPTOR SYRINGE LL ONGUARD

## (undated) DEVICE — TROCAR: Brand: KII SLEEVE

## (undated) DEVICE — 60 ML SYRINGE,REGULAR TIP: Brand: MONOJECT

## (undated) DEVICE — FIBER STD QUANTA 272 MICRON

## (undated) DEVICE — ANTIBACTERIAL UNDYED BRAIDED (POLYGLACTIN 910), SYNTHETIC ABSORBABLE SUTURE: Brand: COATED VICRYL

## (undated) DEVICE — ELECTRODE BLADE MOD  E-Z CLEAN 6.5IN -0014M

## (undated) DEVICE — SUT MONOCRYL 4-0 PS-2 27 IN Y426H

## (undated) DEVICE — PAD GROUNDING ADULT

## (undated) DEVICE — INSUFLATION TUBING INSUFLOW (LEXION)

## (undated) DEVICE — GLOVE INDICATOR PI UNDERGLOVE SZ 8.5 BLUE

## (undated) DEVICE — 3M™ STERI-DRAPE™ UNDER BUTTOCKS DRAPE WITH POUCH 1084: Brand: STERI-DRAPE™

## (undated) DEVICE — TUBING SMOKE EVAC W/FILTRATION DEVICE PLUMEPORT ACTIV

## (undated) DEVICE — STERILE POLYISOPRENE POWDER-FREE SURGICAL GLOVES WITH EMOLLIENT COATING: Brand: PROTEXIS

## (undated) DEVICE — IRRIG ENDO FLO TUBING

## (undated) DEVICE — SOLUTION BOWL: Brand: KENDALL

## (undated) DEVICE — FENESTRATED BIPOLAR FORCEPS: Brand: ENDOWRIST

## (undated) DEVICE — STERILE POLYISOPRENE POWDER-FREE SURGICAL GLOVES: Brand: PROTEXIS

## (undated) DEVICE — 3000CC GUARDIAN II: Brand: GUARDIAN

## (undated) DEVICE — BLADELESS OBTURATOR: Brand: WECK VISTA